# Patient Record
Sex: FEMALE | Race: WHITE | NOT HISPANIC OR LATINO | URBAN - METROPOLITAN AREA
[De-identification: names, ages, dates, MRNs, and addresses within clinical notes are randomized per-mention and may not be internally consistent; named-entity substitution may affect disease eponyms.]

---

## 2020-04-21 ENCOUNTER — EMERGENCY (EMERGENCY)
Facility: HOSPITAL | Age: 65
LOS: 1 days | Discharge: HOME | End: 2020-04-21
Attending: EMERGENCY MEDICINE
Payer: COMMERCIAL

## 2020-04-21 VITALS
TEMPERATURE: 98 F | HEART RATE: 68 BPM | HEIGHT: 67 IN | DIASTOLIC BLOOD PRESSURE: 81 MMHG | RESPIRATION RATE: 18 BRPM | SYSTOLIC BLOOD PRESSURE: 152 MMHG | OXYGEN SATURATION: 100 % | WEIGHT: 149.91 LBS

## 2020-04-21 DIAGNOSIS — Z90.12 ACQUIRED ABSENCE OF LEFT BREAST AND NIPPLE: Chronic | ICD-10-CM

## 2020-04-21 LAB
ALBUMIN SERPL ELPH-MCNC: 4.8 G/DL — SIGNIFICANT CHANGE UP (ref 3.3–5)
ALP SERPL-CCNC: 85 U/L — SIGNIFICANT CHANGE UP (ref 40–120)
ALT FLD-CCNC: 16 U/L — SIGNIFICANT CHANGE UP (ref 10–45)
ANION GAP SERPL CALC-SCNC: 15 MMOL/L — SIGNIFICANT CHANGE UP (ref 5–17)
APTT BLD: 26.3 SEC — LOW (ref 27.5–36.3)
AST SERPL-CCNC: 14 U/L — SIGNIFICANT CHANGE UP (ref 10–40)
BASOPHILS # BLD AUTO: 0.02 K/UL — SIGNIFICANT CHANGE UP (ref 0–0.2)
BASOPHILS NFR BLD AUTO: 0.2 % — SIGNIFICANT CHANGE UP (ref 0–2)
BILIRUB SERPL-MCNC: 0.6 MG/DL — SIGNIFICANT CHANGE UP (ref 0.2–1.2)
BUN SERPL-MCNC: 20 MG/DL — SIGNIFICANT CHANGE UP (ref 7–23)
CALCIUM SERPL-MCNC: 9.2 MG/DL — SIGNIFICANT CHANGE UP (ref 8.4–10.5)
CHLORIDE SERPL-SCNC: 106 MMOL/L — SIGNIFICANT CHANGE UP (ref 96–108)
CO2 SERPL-SCNC: 22 MMOL/L — SIGNIFICANT CHANGE UP (ref 22–31)
CREAT SERPL-MCNC: 0.71 MG/DL — SIGNIFICANT CHANGE UP (ref 0.5–1.3)
EOSINOPHIL # BLD AUTO: 0.07 K/UL — SIGNIFICANT CHANGE UP (ref 0–0.5)
EOSINOPHIL NFR BLD AUTO: 0.8 % — SIGNIFICANT CHANGE UP (ref 0–6)
GLUCOSE SERPL-MCNC: 119 MG/DL — HIGH (ref 70–99)
HCT VFR BLD CALC: 42.5 % — SIGNIFICANT CHANGE UP (ref 34.5–45)
HGB BLD-MCNC: 14 G/DL — SIGNIFICANT CHANGE UP (ref 11.5–15.5)
IMM GRANULOCYTES NFR BLD AUTO: 0.4 % — SIGNIFICANT CHANGE UP (ref 0–1.5)
INR BLD: 1.05 RATIO — SIGNIFICANT CHANGE UP (ref 0.88–1.16)
LYMPHOCYTES # BLD AUTO: 2.09 K/UL — SIGNIFICANT CHANGE UP (ref 1–3.3)
LYMPHOCYTES # BLD AUTO: 23.2 % — SIGNIFICANT CHANGE UP (ref 13–44)
MCHC RBC-ENTMCNC: 28.4 PG — SIGNIFICANT CHANGE UP (ref 27–34)
MCHC RBC-ENTMCNC: 32.9 GM/DL — SIGNIFICANT CHANGE UP (ref 32–36)
MCV RBC AUTO: 86.2 FL — SIGNIFICANT CHANGE UP (ref 80–100)
MONOCYTES # BLD AUTO: 0.66 K/UL — SIGNIFICANT CHANGE UP (ref 0–0.9)
MONOCYTES NFR BLD AUTO: 7.3 % — SIGNIFICANT CHANGE UP (ref 2–14)
NEUTROPHILS # BLD AUTO: 6.12 K/UL — SIGNIFICANT CHANGE UP (ref 1.8–7.4)
NEUTROPHILS NFR BLD AUTO: 68.1 % — SIGNIFICANT CHANGE UP (ref 43–77)
NRBC # BLD: 0 /100 WBCS — SIGNIFICANT CHANGE UP (ref 0–0)
PLATELET # BLD AUTO: 199 K/UL — SIGNIFICANT CHANGE UP (ref 150–400)
POTASSIUM SERPL-MCNC: 3.7 MMOL/L — SIGNIFICANT CHANGE UP (ref 3.5–5.3)
POTASSIUM SERPL-SCNC: 3.7 MMOL/L — SIGNIFICANT CHANGE UP (ref 3.5–5.3)
PROT SERPL-MCNC: 7.9 G/DL — SIGNIFICANT CHANGE UP (ref 6–8.3)
PROTHROM AB SERPL-ACNC: 12 SEC — SIGNIFICANT CHANGE UP (ref 10–12.9)
RBC # BLD: 4.93 M/UL — SIGNIFICANT CHANGE UP (ref 3.8–5.2)
RBC # FLD: 13.4 % — SIGNIFICANT CHANGE UP (ref 10.3–14.5)
SODIUM SERPL-SCNC: 143 MMOL/L — SIGNIFICANT CHANGE UP (ref 135–145)
WBC # BLD: 9 K/UL — SIGNIFICANT CHANGE UP (ref 3.8–10.5)
WBC # FLD AUTO: 9 K/UL — SIGNIFICANT CHANGE UP (ref 3.8–10.5)

## 2020-04-21 PROCEDURE — 70450 CT HEAD/BRAIN W/O DYE: CPT | Mod: 26,77

## 2020-04-21 PROCEDURE — 70450 CT HEAD/BRAIN W/O DYE: CPT | Mod: 26

## 2020-04-21 PROCEDURE — 71045 X-RAY EXAM CHEST 1 VIEW: CPT | Mod: 26

## 2020-04-21 PROCEDURE — 12001 RPR S/N/AX/GEN/TRNK 2.5CM/<: CPT

## 2020-04-21 PROCEDURE — 72125 CT NECK SPINE W/O DYE: CPT | Mod: 26

## 2020-04-21 PROCEDURE — 72170 X-RAY EXAM OF PELVIS: CPT | Mod: 26

## 2020-04-21 PROCEDURE — 93010 ELECTROCARDIOGRAM REPORT: CPT | Mod: 59

## 2020-04-21 PROCEDURE — 73070 X-RAY EXAM OF ELBOW: CPT | Mod: 26,LT

## 2020-04-21 PROCEDURE — 99291 CRITICAL CARE FIRST HOUR: CPT | Mod: 25

## 2020-04-21 RX ORDER — TETANUS TOXOID, REDUCED DIPHTHERIA TOXOID AND ACELLULAR PERTUSSIS VACCINE, ADSORBED 5; 2.5; 8; 8; 2.5 [IU]/.5ML; [IU]/.5ML; UG/.5ML; UG/.5ML; UG/.5ML
0.5 SUSPENSION INTRAMUSCULAR ONCE
Refills: 0 | Status: COMPLETED | OUTPATIENT
Start: 2020-04-21 | End: 2020-04-21

## 2020-04-21 RX ADMIN — TETANUS TOXOID, REDUCED DIPHTHERIA TOXOID AND ACELLULAR PERTUSSIS VACCINE, ADSORBED 0.5 MILLILITER(S): 5; 2.5; 8; 8; 2.5 SUSPENSION INTRAMUSCULAR at 23:10

## 2020-04-21 NOTE — ED PROCEDURE NOTE - CPROC ED POST PROC CARE GUIDE1
Verbal/written post procedure instructions were given to patient/caregiver.
Verbal/written post procedure instructions were given to patient/caregiver./Instructed patient/caregiver to follow-up with primary care physician./Instructed patient/caregiver regarding signs and symptoms of infection./Keep the cast/splint/dressing clean and dry.

## 2020-04-21 NOTE — CONSULT NOTE ADULT - ASSESSMENT
64F s/p fall, sustaining trace subarachnoid hemorrhage, minimally displaced avulsion fracture of left olecranon.     - Appreciate Neurosurgery recommendations for trace subarachnoid hemorrhage: f/u repeat head CT 4h after original  - Consider Orthopedics consult for L olecranon fracture    Patient seen/examined with Chief Resident Courtney. Discussed with attending.    Trauma/Acute Care Surgery [id number] 64F s/p fall, sustaining trace subarachnoid hemorrhage, minimally displaced avulsion fracture of left olecranon.     - Appreciate Neurosurgery recommendations for trace subarachnoid hemorrhage: f/u repeat head CT 4h after original  - Consider Orthopedics consult for L olecranon fracture  - No objection to discharge from Trauma perspective if cleared by NSG/Ortho    Patient seen/examined with Chief Resident Courtney. Discussed with attending.    Trauma/Acute Care Surgery [id number]

## 2020-04-21 NOTE — CONSULT NOTE ADULT - SUBJECTIVE AND OBJECTIVE BOX
p (1480)     HPI:  64F pmhx breast ca in remission with trip and fall found to have small frontal / parietal tSAH, odd affect but otherwise intact.    Imaging:    Exam:  AOx3, strange affect, FC, PERRL, EOMI, no facial   5/5 throughout, no drift  SILT  no clonus    --Anticoagulation:    =====================  PAST MEDICAL HISTORY   Breast cancer  Hyperthyroidism  Hypothyroidism    PAST SURGICAL HISTORY   H/O left mastectomy  S/P D&C (status post dilation and curettage)  Abnormality, eye  C Section  Salivary Gland Disease    Tapazole (Hives)      MEDICATIONS:  Antibiotics:    Neuro:    Other:      SOCIAL HISTORY:   Occupation:   Marital Status:     FAMILY HISTORY:      ROS: Negative except per HPI    LABS:  PT/INR - ( 21 Apr 2020 18:49 )   PT: 12.0 sec;   INR: 1.05 ratio         PTT - ( 21 Apr 2020 18:49 )  PTT:26.3 sec                        14.0   9.00  )-----------( 199      ( 21 Apr 2020 18:49 )             42.5     04-21    143  |  106  |  20  ----------------------------<  119<H>  3.7   |  22  |  0.71    Ca    9.2      21 Apr 2020 18:49    TPro  7.9  /  Alb  4.8  /  TBili  0.6  /  DBili  x   /  AST  14  /  ALT  16  /  AlkPhos  85  04-21
TRAUMA SERVICE (Acute Care Surgery / ACS - #9039) - CONSULT NOTE  --------------------------------------------------------------------------------------------    TRAUMA ACTIVATION LEVEL: 3    MECHANISM OF INJURY:    [] Blunt	[] MVC	[x] Fall	[] Pedestrian Struck	[] Motorcycle accident    [] Penetrating	[] Gun Shot Wound 	[] Stab Wound    GCS: 	E: 4	V: 5	M: 6    HPI: 64F PMH breast ca s/p mastectomy w/implant reconstruction, HTN, hypothryoidism, no AC/AP presents after fall down 6 steps. Patient reports she has had dizziness recently for which she is seeing outpatient Neurologist and ENT. She lost her balance and fell down 6 steps, hit her head and her elbow, ?LOC. Fall was witnessed by her son who is an EMT and who brought her to [facility] ED. Patient was stable upon presentation to ED and imaging revealed frontal/parietal SAH, and L olecranon fx. Scalp laceration was repaired in ED. Neurosurgery was consulted for SAH, and Trauma Surgery consulted for evaluation of polytrauma. Upon evaluation in ED patient c/o elbow pain, however she denies any other complaints. The patient denies fever, chills; chest pain, SOB, palpitation, weakness; nausea, vomiting; diarrhea, constipation; abdominal pain; bladder and bowel problems; leg swelling; sick contacts; and recent travel.     Primary Survey:   A - airway intact  B - bilateral breath sounds and good chest rise  C - initial BP: 128/72 (04-21-20 @ 19:08) , HR: 65 (04-21-20 @ 19:08) , palpable pulses in all extremities  D - GCS 15 on arrival  Exposure obtained      Secondary Survey:  General: NAD, confused affect  HEENT: dried blood to left scalp, L scalp laceration with staples intact, EOMI, PEERLA.  Neck: Soft, midline trachea.  Chest: No chest wall tenderness.   Cardiac: S1, S2, RRR  Respiratory: Bilateral breath sounds, clear and equal bilaterally  Abdomen: Soft, non-distended, non-tender, no rebound, no guarding, no masses palpated  Groin: Normal appearing  Ext: palp radial b/l UE, b/l DP palp in Lower Extrem, motor and sensory grossly intact in all 4 extremities; L elbow ROM limited 2/2 pain  Back: no TTP, no palpable runoff/stepoff/deformity    Patient denies fevers/chills, denies SOB/chest pain, denies nausea/vomiting, denies constipation/diarrhea.    ROS: 10-system review is otherwise negative except HPI above.      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 Lives with  who is an ophthalmologist. Works in 's office.    ALLERGIES: Tapazole (Hives)      HOME MEDICATIONS: norvasc    --------------------------------------------------------------------------------------------    Vitals:   T(C): 37.2 (04-21-20 @ 19:08), Max: 37.2 (04-21-20 @ 19:08)  HR: 65 (04-21-20 @ 19:08) (65 - 68)  BP: 128/72 (04-21-20 @ 19:08) (128/72 - 152/81)  RR: 18 (04-21-20 @ 19:08) (18 - 18)  SpO2: 97% (04-21-20 @ 19:08) (97% - 100%)      Height (cm): 170.18 (04-21 @ 18:13)  Weight (kg): 68 (04-21 @ 18:13)  BMI (kg/m2): 23.5 (04-21 @ 18:13)  BSA (m2): 1.79 (04-21 @ 18:13)    --------------------------------------------------------------------------------------------    LABS  CBC (04-21 @ 18:49)                              14.0                           9.00    )----------------(  199        68.1  % Neutrophils, 23.2  % Lymphocytes, ANC: 6.12                                42.5      BMP (04-21 @ 18:49)             143     |  106     |  20    		Ca++ --      Ca 9.2                ---------------------------------( 119<H>		Mg --                 3.7     |  22      |  0.71  			Ph --        LFTs (04-21 @ 18:49)      TPro 7.9 / Alb 4.8 / TBili 0.6 / DBili -- / AST 14 / ALT 16 / AlkPhos 85    Coags (04-21 @ 18:49)  aPTT 26.3<L> / INR 1.05 / PT 12.0          --------------------------------------------------------------------------------------------    MICROBIOLOGY      --------------------------------------------------------------------------------------------    IMAGING    < from: CT Head No Cont (04.21.20 @ 19:51) >  MPRESSION: Trace left posterior frontal traumatic subarachnoid hemorrhage. Left parietal extracalvarial soft tissue swelling.    < end of copied text >    < from: CT Cervical Spine No Cont (04.21.20 @ 19:34) >  IMPRESSION: Mild degenerative changes. No acute fractures or dislocations.    < end of copied text >    < from: Xray Elbow AP + Lateral, Left (04.21.20 @ 20:30) >  IMPRESSION:     Generalized osseous demineralization. There is an acute minimally displaced avulsion fracture of the olecranon with a large amount of overlying soft tissue swelling. The remaining left elbow is free of acute fracture or dislocation.       < end of copied text >    < from: Xray Pelvis AP only (04.21.20 @ 20:22) >    IMPRESSION:     No acute fracture or dislocation.  Degenerative changes of spine.  Spatulated right transverse process of the fifth lumbar spine representing transitional vertebra.    < end of copied text >    < from: Xray Chest 1 View AP/PA (04.21.20 @ 20:22) >  IMPRESSION:   Mild bilateral perihilar hazy opacities, which may represent infection.    < end of copied text >

## 2020-04-21 NOTE — ED ADULT NURSE NOTE - OBJECTIVE STATEMENT
63 y/o female coming in via EMS from home s/p mechanical fall. AOx4, ambulatory at baseline, PMH hypothyroid on synthroid, HTN, breast cancer with single mastectomy on left side. Pt. states she was at home and slipped down a few stairs, unwitnessed fall. Pt. reports that she remembers the event. pt. also reports baseline dizziness that she has had for a few months now and had an outpatient MRI of her brain approx. 1 month ago. From fall, pt. sustained left laceration above the eye and left elbow hematoma. Reporting 4/10 left elbow pain upon movement. pt. denies any chest pain, abdominal pain, SOB, N/V/D. VSS. 20G RAC. Pt. placed in hospital gown. Will continue to reassess.

## 2020-04-21 NOTE — ED ADULT NURSE REASSESSMENT NOTE - NS ED NURSE REASSESS COMMENT FT1
Pt resting comfortably. EKG completed by RN. No c/o discomfort or s/s distress. No active bleeding visible. VSS.

## 2020-04-21 NOTE — ED PROVIDER NOTE - OBJECTIVE STATEMENT
64 year old female PMH breast CA s/p mastectomy, hypothyroidism, HTN, BIBEMS s/p fall at home. Patient fell down approximately 6 stairs after "losing her balance", states she did not lose consciousness, remembers entire incident, ambulatory after fall. Not on ASA or AC. Struck L forehead and L elbow. Currently only c/o L elbow pain. States she has chronic dizziness, seen outpatient by neuro and had recent MRI/MRA w/o significant findings. Patient does not feel more dizzy currently than baseline. Denies fever, visual changes, cp, sob, abd pain, N/V, weakness, or paresthesias.

## 2020-04-21 NOTE — CONSULT NOTE ADULT - ASSESSMENT
Younger, Yelitza  64F pmhx breast ca in remission with trip and fall found to have small frontal / parietal tSAH, odd affect but otherwise intact.  - no acute intervention  - repeat cth 4h from original  - outpatient f/u with Dr. Aquino after discharge

## 2020-04-21 NOTE — ED PROVIDER NOTE - NSFOLLOWUPINSTRUCTIONS_ED_ALL_ED_FT
1- Tylenol as needed for pain  2- Keep arm in sling, you can remove this periodically throughout the day and move your shoulder around.  Follow up with Dr Mcgee next week  3- If you are having any headaches, confusion, blurry vision, nausea/vomting, numbness/weakness, or any other concerns come back to the ER immediately.  You can follow up with Dr Aquino with regard to the brain bleed next week.

## 2020-04-21 NOTE — ED PROCEDURE NOTE - CPROC ED TIME OUT STATEMENT1
“Patient's name, , procedure and correct site were confirmed during the [place] Timeout.”
“Patient's name, , procedure and correct site were confirmed during the [place] Timeout.”

## 2020-04-21 NOTE — ED PROVIDER NOTE - PATIENT PORTAL LINK FT
You can access the FollowMyHealth Patient Portal offered by [facility] by registering at the following website: [email or web address]. By joining [facility]’s FollowMyHealth portal, you will also be able to view your health information using other applications (apps) compatible with our system.

## 2020-04-21 NOTE — ED PROVIDER NOTE - CARE PROVIDER_API CALL
Vikas Aquino)  Neurological Surgery  [address], [address] [place] [id number]  Phone: [phone]  Fax: [phone]  Follow Up Time:     Josh Mcgee)  Orthopaedic Surgery  [address], [address]  [place] [address]  Phone: [phone]  Fax: [phone]  Follow Up Time:

## 2020-04-21 NOTE — ED PROVIDER NOTE - CLINICAL SUMMARY MEDICAL DECISION MAKING FREE TEXT BOX
Wing: 64 year old female s/p fall down 6 steps. not on ac. no loc, + head injury, + pain to elbow. will get imaging. laceration repair in scalp. reassess

## 2020-04-21 NOTE — ED PROVIDER NOTE - CARE PLAN
Principal Discharge DX:	SAH (subarachnoid hemorrhage)  Secondary Diagnosis:	Elbow fracture, left, closed, initial encounter

## 2020-04-21 NOTE — ED PROVIDER NOTE - PHYSICAL EXAMINATION
GENERAL: A&Ox3, non-toxic appearing, no acute distress  HEENT: NCAT, EOMI, oral mucosa moist, normal conjunctiva  RESP: CTAB, no respiratory distress, no wheezes/rhonchi/rales, speaking in full sentences  CV: RRR, no murmurs/rubs/gallops  ABDOMEN: soft, non-tender, non-distended, no guarding  MSK: no midline spinal tenderness, L elbow FROM  NEURO: no focal sensory or motor deficits, normal CN exam, COSBY, 5/5 strength in all extremities, SILT, PERRL  SKIN: warm, normal color, well perfused, no rash, hematoma to L elbow, laceration to L anterior forehead  PSYCH: normal affect    -Laith Begum, PGY-1 GENERAL: A&Ox3, non-toxic appearing, no acute distress  HEENT: NCAT, EOMI, oral mucosa moist, normal conjunctiva  RESP: CTAB, no respiratory distress, no wheezes/rhonchi/rales, speaking in full sentences  CV: RRR, no murmurs/rubs/gallops  ABDOMEN: soft, non-tender, non-distended, no guarding  MSK: no midline spinal tenderness, L elbow FROM  NEURO: no focal sensory or motor deficits, normal CN exam, COSBY, 5/5 strength in all extremities, SILT, PERRL  SKIN: warm, normal color, well perfused, no rash, hematoma to L elbow, linear 2.5cm laceration to L anterior scalp  PSYCH: normal affect    -Laith Begum, PGY-1

## 2020-04-21 NOTE — ED PROVIDER NOTE - CARE PROVIDERS DIRECT ADDRESSES
[email or web address]

## 2020-04-21 NOTE — ED PROVIDER NOTE - PSH
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

## 2020-04-21 NOTE — ED PROVIDER NOTE - PROGRESS NOTE DETAILS
Wing: Patient with SAH on ct head. spoke with nsx. Patient repeat CT head shows no interval change. spoke with Neurosurgery and cleared patient to be discharged. to f/u outpatient. patient updated.

## 2020-04-22 VITALS
TEMPERATURE: 99 F | HEART RATE: 82 BPM | OXYGEN SATURATION: 99 % | SYSTOLIC BLOOD PRESSURE: 152 MMHG | RESPIRATION RATE: 18 BRPM | DIASTOLIC BLOOD PRESSURE: 73 MMHG

## 2020-05-01 ENCOUNTER — APPOINTMENT (OUTPATIENT)
Dept: NEUROSURGERY | Facility: CLINIC | Age: 65
End: 2020-05-01
Payer: COMMERCIAL

## 2020-05-01 DIAGNOSIS — S06.5X9A TRAUMATIC SUBDURAL HEMORRHAGE WITH LOSS OF CONSCIOUSNESS OF UNSPECIFIED DURATION, INITIAL ENCOUNTER: ICD-10-CM

## 2020-05-01 PROCEDURE — 99204 OFFICE O/P NEW MOD 45 MIN: CPT | Mod: 95

## 2020-05-04 NOTE — ASSESSMENT
[FreeTextEntry1] : Tele health visit from 9:35 am to 10:17 am. (52 min)\par Impression:\par S/P fall with trace subarachnoid hemorrhage in the left posterior frontal region which was stable on second CT in the ER. Patient with good cognitive functions, has no  neurological symptoms other than mild  balance issues following up with ENT.  Will repeat imaging in a week with follow up appointment.\par \par \par Plan:\par CT head w/o contrast in a week \par PT focused on balance from [facility], 2-3 times/week x 8 weeks\par Continue home medications\par Precautions given for prevention of fall\par Will do the follow up on 5/8 at 9:30 am\par Advised to consult her physician for shortness of breath.\par

## 2020-05-04 NOTE — RESULTS/DATA
[FreeTextEntry1] : Trace subarachnoid hemorrhage in the left posterior frontal region \par is stable. There is no obvious new acute intracranial hemorrhage. There is \par no large cortical infarct, mass effect or midline shift. Nonspecific mild \par periventricular and subcortical white matter lucencies likely represent \par chronic microvascular ischemic changes. There is mild cerebral volume loss \par with ventricular dilatation. \par \par There is no depressed skull fracture. Again noted is a left parietal scalp \par soft tissue hematoma/swelling. There are skin staples overlying the left \par frontal scalp laceration. Stable small density in the left anterior ethmoid \par sinus is suggestive of an osteoma. There is minimal sinus mucosal \par thickening. The tympanomastoid region is clear. There is no significant \par interval change. \par \par IMPRESSION: \par \par No significant interval change since 4/21/2020 7:21 PM.

## 2020-05-04 NOTE — HISTORY OF PRESENT ILLNESS
[< 3 months] : less than 3 months [Home] : at home, [unfilled] , at the time of the visit. [Other Location: e.g. Home (Enter Location, City,State)___] : at [unfilled] [Other:____] : [unfilled] [Self] : self [Patient] : the patient [Patient Declined  Services] : - None: Patient declined  services [de-identified] : Ms Arthur is a  64 yrs old pleasant lady with PMH of breast cancer,hypothyroidism and HTN , not on any AC,  here consulting for her brain bleed after a fall on 4/21. On 4/21 she dripped and fell while coming down to steps, 4-5 flights, with hitting her head on the floor. She was taken to [facility] ER  and CT done showed  that she had a bleed in the head. She was observed few more hours in the ER and CT repeated showed stable SAH.  She had a elbow fracture and a sling was placed on left arm.Her scalp was stapled in ER and was discharged home.\par \par Today Ms Arthur  presented for telehealth believes that she is not feeling well. Denies headache, speech impairment, vision problems or seizures. She reported having balance problem while walking, and consulted ENT for it and they said has some issues with ear but not a reason for dizziness. She wears sling on left arm, waking at home with support, she holds the wall while walking as she feels imbalance often. She takes Tamoxifen 20 mg daily, not taking any pain medication. She has some memory issues , but she said she had that even before the accident. Her staplers on the scalp was removed by her , who is a physician. She was able to write down and  repeat all the instructions which was given during the conversation.

## 2020-05-04 NOTE — PHYSICAL EXAM
[General Appearance - Alert] : alert [General Appearance - Well Nourished] : well nourished [Oriented To Time, Place, And Person] : oriented to person, place, and time [General Appearance - Well-Appearing] : healthy appearing [Person] : oriented to person [Affect] : the affect was normal [Place] : oriented to place [Time] : oriented to time [Coordination - Dysmetria Impaired Finger-to-Nose Right Only] : not present on the ride side [FreeTextEntry5] : EOM mostly intact [FreeTextEntry8] : no pronator drift, could not test drift or finger to nose on left arm.  Pt appeared to be walking fairly well while holding the phone, but she said she was holding onto the wall. [FreeTextEntry1] : Mild SOB at rest (pt said she is not normally winded)

## 2020-05-07 ENCOUNTER — APPOINTMENT (OUTPATIENT)
Dept: CT IMAGING | Facility: CLINIC | Age: 65
End: 2020-05-07

## 2020-05-08 ENCOUNTER — APPOINTMENT (OUTPATIENT)
Dept: NEUROSURGERY | Facility: CLINIC | Age: 65
End: 2020-05-08
Payer: COMMERCIAL

## 2020-05-08 PROCEDURE — 99443: CPT

## 2020-05-08 NOTE — PHYSICAL EXAM
[General Appearance - Alert] : alert [General Appearance - Well Nourished] : well nourished [General Appearance - Well-Appearing] : healthy appearing [Oriented To Time, Place, And Person] : oriented to person, place, and time [Memory Recent] : recent memory was not impaired [Affect] : the affect was normal [Person] : oriented to person [Place] : oriented to place [Time] : oriented to time

## 2020-05-13 NOTE — ASSESSMENT
[FreeTextEntry1] : \par Tele health visit from 9:45 am to 10:15 am. (30min)\par Impression:\par S/P fall with trace subarachnoid hemorrhage in the left posterior frontal region which was stable on second CT in the ER. Patient with good cognitive functions, has no neurological symptoms other than mild balance issues following up with ENT. Will repeat imaging in a week with follow up appointment.\par \par \par Plan:\par Precautions given for prevention of fall\par PT focused on balance  2-3 times/week x 8 weeks\par [id number]\par Continue home medications\par 2 weeks follow up \par

## 2020-05-13 NOTE — RESULTS/DATA
[FreeTextEntry1] : CT head at [facility] site ([facility] radiology [place]) seen on portal, report obtained from portal: 5/7/2020.  No new blood or collections, resolution of previous hemorrhage.

## 2020-05-13 NOTE — HISTORY OF PRESENT ILLNESS
[Home] : at home, [unfilled] , at the time of the visit. [Other Location: e.g. Home (Enter Location, City,State)___] : at [unfilled] [Other:____] : [unfilled] [Patient] : the patient [Self] : self [FreeTextEntry2] : Telephone visit.  Pt not able to tolerate the process of negotiating the log-on to telehealth visit today [FreeTextEntry1] : Ms Arthur is a 64 yrs old pleasant lady with PMH of breast cancer,hypothyroidism and HTN , not on any AC, here consulting for her brain bleed after a fall on 4/21. On 4/21 she dripped and fell while coming down to steps, 4-5 flights, with hitting her head on the floor. She was taken to [facility] ER and CT done showed that she had a bleed in the head. She was observed few more hours in the ER and CT repeated showed stable SAH. She had a elbow fracture and a sling was placed on left arm.Her scalp was stapled in ER and was discharged home.\par \par Today Ms Arthur presented for telephone visit and believes that she is not feeling well. She had a fall yesterday after coming back from CT scan, she dripped and fall while stepping on the front of her house and fell hitting her shoulder on the grass. Denies headache, speech impairment, vision problems or seizures. She reported still  having balance problem while walking. she holds the wall while walking as she feels imbalance often.Today also she was able to write down and repeat all the instructions which was given during the conversation

## 2020-10-15 ENCOUNTER — OUTPATIENT (OUTPATIENT)
Dept: OUTPATIENT SERVICES | Facility: HOSPITAL | Age: 65
LOS: 1 days | End: 2020-10-15
Payer: MEDICARE

## 2020-10-15 ENCOUNTER — RESULT REVIEW (OUTPATIENT)
Age: 65
End: 2020-10-15

## 2020-10-15 ENCOUNTER — APPOINTMENT (OUTPATIENT)
Dept: RADIOLOGY | Facility: HOSPITAL | Age: 65
End: 2020-10-15
Payer: MEDICARE

## 2020-10-15 DIAGNOSIS — Z90.12 ACQUIRED ABSENCE OF LEFT BREAST AND NIPPLE: Chronic | ICD-10-CM

## 2020-10-15 DIAGNOSIS — R27.0 ATAXIA, UNSPECIFIED: ICD-10-CM

## 2020-10-15 LAB
APPEARANCE CSF: CLEAR — SIGNIFICANT CHANGE UP
APPEARANCE SPUN FLD: COLORLESS — SIGNIFICANT CHANGE UP
COLOR CSF: SIGNIFICANT CHANGE UP
GLUCOSE CSF-MCNC: 70 MG/DL — SIGNIFICANT CHANGE UP (ref 40–70)
GRAM STN FLD: SIGNIFICANT CHANGE UP
LYMPHOCYTES # CSF: 81 % — HIGH (ref 40–80)
MONOS+MACROS NFR CSF: 19 % — SIGNIFICANT CHANGE UP (ref 15–45)
NEUTROPHILS # CSF: 0 % — SIGNIFICANT CHANGE UP (ref 0–6)
NRBC NFR CSF: 2 /UL — SIGNIFICANT CHANGE UP (ref 0–5)
PROT CSF-MCNC: 49 MG/DL — HIGH (ref 15–45)
RBC # CSF: 0 /UL — SIGNIFICANT CHANGE UP (ref 0–0)
SPECIMEN SOURCE: SIGNIFICANT CHANGE UP
TUBE TYPE: SIGNIFICANT CHANGE UP

## 2020-10-15 PROCEDURE — 88108 CYTOPATH CONCENTRATE TECH: CPT | Mod: 26

## 2020-10-15 PROCEDURE — 62328 DX LMBR SPI PNXR W/FLUOR/CT: CPT

## 2020-10-16 LAB
LABORATORY COMMENT REPORT: SIGNIFICANT CHANGE UP
SOURCE HSV 1/2: SIGNIFICANT CHANGE UP

## 2020-10-18 LAB
CULTURE RESULTS: NO GROWTH — SIGNIFICANT CHANGE UP
EBV PCR: SIGNIFICANT CHANGE UP IU/ML
SPECIMEN SOURCE: SIGNIFICANT CHANGE UP

## 2021-04-19 ENCOUNTER — INPATIENT (INPATIENT)
Facility: HOSPITAL | Age: 66
LOS: 7 days | Discharge: NURSING, REHAB OR LONG-TERM CARE | DRG: 964 | End: 2021-04-27
Attending: SURGERY | Admitting: SURGERY
Payer: MEDICARE

## 2021-04-19 VITALS — HEIGHT: 67 IN

## 2021-04-19 DIAGNOSIS — Z90.12 ACQUIRED ABSENCE OF LEFT BREAST AND NIPPLE: Chronic | ICD-10-CM

## 2021-04-19 DIAGNOSIS — S09.90XA UNSPECIFIED INJURY OF HEAD, INITIAL ENCOUNTER: ICD-10-CM

## 2021-04-19 LAB
ALBUMIN SERPL ELPH-MCNC: 4.1 G/DL — SIGNIFICANT CHANGE UP (ref 3.3–5)
ALP SERPL-CCNC: 92 U/L — SIGNIFICANT CHANGE UP (ref 40–120)
ALT FLD-CCNC: 37 U/L — SIGNIFICANT CHANGE UP (ref 10–45)
ANION GAP SERPL CALC-SCNC: 14 MMOL/L — SIGNIFICANT CHANGE UP (ref 5–17)
APPEARANCE UR: CLEAR — SIGNIFICANT CHANGE UP
APTT BLD: 25.2 SEC — LOW (ref 27.5–35.5)
APTT BLD: 28 SEC — SIGNIFICANT CHANGE UP (ref 27.5–35.5)
AST SERPL-CCNC: 39 U/L — SIGNIFICANT CHANGE UP (ref 10–40)
BACTERIA # UR AUTO: NEGATIVE — SIGNIFICANT CHANGE UP
BASE EXCESS BLDV CALC-SCNC: -4.4 MMOL/L — LOW (ref -2–2)
BASE EXCESS BLDV CALC-SCNC: -5.1 MMOL/L — LOW (ref -2–2)
BASOPHILS # BLD AUTO: 0.06 K/UL — SIGNIFICANT CHANGE UP (ref 0–0.2)
BASOPHILS NFR BLD AUTO: 0.3 % — SIGNIFICANT CHANGE UP (ref 0–2)
BILIRUB SERPL-MCNC: 0.5 MG/DL — SIGNIFICANT CHANGE UP (ref 0.2–1.2)
BILIRUB UR-MCNC: NEGATIVE — SIGNIFICANT CHANGE UP
BUN SERPL-MCNC: 19 MG/DL — SIGNIFICANT CHANGE UP (ref 7–23)
CA-I SERPL-SCNC: 1.1 MMOL/L — LOW (ref 1.12–1.3)
CA-I SERPL-SCNC: 1.15 MMOL/L — SIGNIFICANT CHANGE UP (ref 1.12–1.3)
CALCIUM SERPL-MCNC: 9 MG/DL — SIGNIFICANT CHANGE UP (ref 8.4–10.5)
CHLORIDE BLDV-SCNC: 114 MMOL/L — HIGH (ref 96–108)
CHLORIDE BLDV-SCNC: 114 MMOL/L — HIGH (ref 96–108)
CHLORIDE SERPL-SCNC: 107 MMOL/L — SIGNIFICANT CHANGE UP (ref 96–108)
CK SERPL-CCNC: 134 U/L — SIGNIFICANT CHANGE UP (ref 25–170)
CO2 BLDV-SCNC: 20 MMOL/L — LOW (ref 22–30)
CO2 BLDV-SCNC: 20 MMOL/L — LOW (ref 22–30)
CO2 SERPL-SCNC: 21 MMOL/L — LOW (ref 22–31)
COLOR SPEC: COLORLESS — SIGNIFICANT CHANGE UP
CREAT SERPL-MCNC: 0.62 MG/DL — SIGNIFICANT CHANGE UP (ref 0.5–1.3)
DIFF PNL FLD: NEGATIVE — SIGNIFICANT CHANGE UP
EOSINOPHIL # BLD AUTO: 0.07 K/UL — SIGNIFICANT CHANGE UP (ref 0–0.5)
EOSINOPHIL NFR BLD AUTO: 0.4 % — SIGNIFICANT CHANGE UP (ref 0–6)
EPI CELLS # UR: 1 /HPF — SIGNIFICANT CHANGE UP
GAS PNL BLDA: SIGNIFICANT CHANGE UP
GAS PNL BLDA: SIGNIFICANT CHANGE UP
GAS PNL BLDV: 134 MMOL/L — LOW (ref 135–145)
GAS PNL BLDV: 137 MMOL/L — SIGNIFICANT CHANGE UP (ref 135–145)
GAS PNL BLDV: SIGNIFICANT CHANGE UP
GLUCOSE BLDV-MCNC: 143 MG/DL — HIGH (ref 70–99)
GLUCOSE BLDV-MCNC: 155 MG/DL — HIGH (ref 70–99)
GLUCOSE SERPL-MCNC: 163 MG/DL — HIGH (ref 70–99)
GLUCOSE UR QL: NEGATIVE — SIGNIFICANT CHANGE UP
HCO3 BLDV-SCNC: 19 MMOL/L — LOW (ref 21–29)
HCO3 BLDV-SCNC: 19 MMOL/L — LOW (ref 21–29)
HCT VFR BLD CALC: 28.4 % — LOW (ref 34.5–45)
HCT VFR BLD CALC: 29.8 % — LOW (ref 34.5–45)
HCT VFR BLD CALC: 33.8 % — LOW (ref 34.5–45)
HCT VFR BLD CALC: 34.6 % — SIGNIFICANT CHANGE UP (ref 34.5–45)
HCT VFR BLD CALC: 35.9 % — SIGNIFICANT CHANGE UP (ref 34.5–45)
HCT VFR BLD CALC: 39.6 % — SIGNIFICANT CHANGE UP (ref 34.5–45)
HCT VFR BLD CALC: 41.2 % — SIGNIFICANT CHANGE UP (ref 34.5–45)
HCT VFR BLDA CALC: 32 % — LOW (ref 39–50)
HCT VFR BLDA CALC: 42 % — SIGNIFICANT CHANGE UP (ref 39–50)
HGB BLD CALC-MCNC: 10.2 G/DL — LOW (ref 11.5–15.5)
HGB BLD CALC-MCNC: 13.6 G/DL — SIGNIFICANT CHANGE UP (ref 11.5–15.5)
HGB BLD-MCNC: 10.9 G/DL — LOW (ref 11.5–15.5)
HGB BLD-MCNC: 11.2 G/DL — LOW (ref 11.5–15.5)
HGB BLD-MCNC: 11.5 G/DL — SIGNIFICANT CHANGE UP (ref 11.5–15.5)
HGB BLD-MCNC: 13 G/DL — SIGNIFICANT CHANGE UP (ref 11.5–15.5)
HGB BLD-MCNC: 13.3 G/DL — SIGNIFICANT CHANGE UP (ref 11.5–15.5)
HGB BLD-MCNC: 9.2 G/DL — LOW (ref 11.5–15.5)
HGB BLD-MCNC: 9.7 G/DL — LOW (ref 11.5–15.5)
HOROWITZ INDEX BLDV+IHG-RTO: 21 — SIGNIFICANT CHANGE UP
HOROWITZ INDEX BLDV+IHG-RTO: 28 — SIGNIFICANT CHANGE UP
HYALINE CASTS # UR AUTO: 2 /LPF — SIGNIFICANT CHANGE UP (ref 0–2)
IMM GRANULOCYTES NFR BLD AUTO: 2.3 % — HIGH (ref 0–1.5)
INR BLD: 1.02 RATIO — SIGNIFICANT CHANGE UP (ref 0.88–1.16)
INR BLD: 1.13 RATIO — SIGNIFICANT CHANGE UP (ref 0.88–1.16)
KETONES UR-MCNC: ABNORMAL
LACTATE BLDV-MCNC: 2.7 MMOL/L — HIGH (ref 0.7–2)
LACTATE BLDV-MCNC: 3.7 MMOL/L — HIGH (ref 0.7–2)
LEUKOCYTE ESTERASE UR-ACNC: NEGATIVE — SIGNIFICANT CHANGE UP
LIDOCAIN IGE QN: 49 U/L — SIGNIFICANT CHANGE UP (ref 7–60)
LYMPHOCYTES # BLD AUTO: 21.8 % — SIGNIFICANT CHANGE UP (ref 13–44)
LYMPHOCYTES # BLD AUTO: 4.1 K/UL — HIGH (ref 1–3.3)
MCHC RBC-ENTMCNC: 28.8 PG — SIGNIFICANT CHANGE UP (ref 27–34)
MCHC RBC-ENTMCNC: 28.8 PG — SIGNIFICANT CHANGE UP (ref 27–34)
MCHC RBC-ENTMCNC: 28.9 PG — SIGNIFICANT CHANGE UP (ref 27–34)
MCHC RBC-ENTMCNC: 28.9 PG — SIGNIFICANT CHANGE UP (ref 27–34)
MCHC RBC-ENTMCNC: 29.1 PG — SIGNIFICANT CHANGE UP (ref 27–34)
MCHC RBC-ENTMCNC: 29.3 PG — SIGNIFICANT CHANGE UP (ref 27–34)
MCHC RBC-ENTMCNC: 29.6 PG — SIGNIFICANT CHANGE UP (ref 27–34)
MCHC RBC-ENTMCNC: 32 GM/DL — SIGNIFICANT CHANGE UP (ref 32–36)
MCHC RBC-ENTMCNC: 32.2 GM/DL — SIGNIFICANT CHANGE UP (ref 32–36)
MCHC RBC-ENTMCNC: 32.3 GM/DL — SIGNIFICANT CHANGE UP (ref 32–36)
MCHC RBC-ENTMCNC: 32.4 GM/DL — SIGNIFICANT CHANGE UP (ref 32–36)
MCHC RBC-ENTMCNC: 32.4 GM/DL — SIGNIFICANT CHANGE UP (ref 32–36)
MCHC RBC-ENTMCNC: 32.6 GM/DL — SIGNIFICANT CHANGE UP (ref 32–36)
MCHC RBC-ENTMCNC: 32.8 GM/DL — SIGNIFICANT CHANGE UP (ref 32–36)
MCV RBC AUTO: 88.7 FL — SIGNIFICANT CHANGE UP (ref 80–100)
MCV RBC AUTO: 89 FL — SIGNIFICANT CHANGE UP (ref 80–100)
MCV RBC AUTO: 89.2 FL — SIGNIFICANT CHANGE UP (ref 80–100)
MCV RBC AUTO: 89.2 FL — SIGNIFICANT CHANGE UP (ref 80–100)
MCV RBC AUTO: 89.9 FL — SIGNIFICANT CHANGE UP (ref 80–100)
MCV RBC AUTO: 90.2 FL — SIGNIFICANT CHANGE UP (ref 80–100)
MCV RBC AUTO: 91.8 FL — SIGNIFICANT CHANGE UP (ref 80–100)
MONOCYTES # BLD AUTO: 0.87 K/UL — SIGNIFICANT CHANGE UP (ref 0–0.9)
MONOCYTES NFR BLD AUTO: 4.6 % — SIGNIFICANT CHANGE UP (ref 2–14)
NEUTROPHILS # BLD AUTO: 13.23 K/UL — HIGH (ref 1.8–7.4)
NEUTROPHILS NFR BLD AUTO: 70.6 % — SIGNIFICANT CHANGE UP (ref 43–77)
NITRITE UR-MCNC: NEGATIVE — SIGNIFICANT CHANGE UP
NRBC # BLD: 0 /100 WBCS — SIGNIFICANT CHANGE UP (ref 0–0)
OTHER CELLS CSF MANUAL: 12 ML/DL — LOW (ref 18–22)
OTHER CELLS CSF MANUAL: 13 ML/DL — LOW (ref 18–22)
PCO2 BLDV: 32 MMHG — LOW (ref 39–42)
PCO2 BLDV: 34 MMHG — LOW (ref 39–42)
PH BLDV: 7.38 — SIGNIFICANT CHANGE UP (ref 7.35–7.45)
PH BLDV: 7.38 — SIGNIFICANT CHANGE UP (ref 7.35–7.45)
PH UR: 6.5 — SIGNIFICANT CHANGE UP (ref 5–8)
PLATELET # BLD AUTO: 120 K/UL — LOW (ref 150–400)
PLATELET # BLD AUTO: 123 K/UL — LOW (ref 150–400)
PLATELET # BLD AUTO: 125 K/UL — LOW (ref 150–400)
PLATELET # BLD AUTO: 125 K/UL — LOW (ref 150–400)
PLATELET # BLD AUTO: 148 K/UL — LOW (ref 150–400)
PLATELET # BLD AUTO: 154 K/UL — SIGNIFICANT CHANGE UP (ref 150–400)
PLATELET # BLD AUTO: 236 K/UL — SIGNIFICANT CHANGE UP (ref 150–400)
PO2 BLDV: 37 MMHG — SIGNIFICANT CHANGE UP (ref 25–45)
PO2 BLDV: 51 MMHG — HIGH (ref 25–45)
POTASSIUM BLDV-SCNC: 3.6 MMOL/L — SIGNIFICANT CHANGE UP (ref 3.5–5.3)
POTASSIUM BLDV-SCNC: 4.4 MMOL/L — SIGNIFICANT CHANGE UP (ref 3.5–5.3)
POTASSIUM SERPL-MCNC: 4 MMOL/L — SIGNIFICANT CHANGE UP (ref 3.5–5.3)
POTASSIUM SERPL-SCNC: 4 MMOL/L — SIGNIFICANT CHANGE UP (ref 3.5–5.3)
PROT SERPL-MCNC: 6.7 G/DL — SIGNIFICANT CHANGE UP (ref 6–8.3)
PROT UR-MCNC: ABNORMAL
PROTHROM AB SERPL-ACNC: 12.2 SEC — SIGNIFICANT CHANGE UP (ref 10.6–13.6)
PROTHROM AB SERPL-ACNC: 13.5 SEC — SIGNIFICANT CHANGE UP (ref 10.6–13.6)
RBC # BLD: 3.19 M/UL — LOW (ref 3.8–5.2)
RBC # BLD: 3.36 M/UL — LOW (ref 3.8–5.2)
RBC # BLD: 3.68 M/UL — LOW (ref 3.8–5.2)
RBC # BLD: 3.85 M/UL — SIGNIFICANT CHANGE UP (ref 3.8–5.2)
RBC # BLD: 3.98 M/UL — SIGNIFICANT CHANGE UP (ref 3.8–5.2)
RBC # BLD: 4.44 M/UL — SIGNIFICANT CHANGE UP (ref 3.8–5.2)
RBC # BLD: 4.62 M/UL — SIGNIFICANT CHANGE UP (ref 3.8–5.2)
RBC # FLD: 13 % — SIGNIFICANT CHANGE UP (ref 10.3–14.5)
RBC # FLD: 13 % — SIGNIFICANT CHANGE UP (ref 10.3–14.5)
RBC # FLD: 13.1 % — SIGNIFICANT CHANGE UP (ref 10.3–14.5)
RBC # FLD: 13.2 % — SIGNIFICANT CHANGE UP (ref 10.3–14.5)
RBC # FLD: 13.3 % — SIGNIFICANT CHANGE UP (ref 10.3–14.5)
RBC CASTS # UR COMP ASSIST: 4 /HPF — SIGNIFICANT CHANGE UP (ref 0–4)
SAO2 % BLDV: 69 % — SIGNIFICANT CHANGE UP (ref 67–88)
SAO2 % BLDV: 85 % — SIGNIFICANT CHANGE UP (ref 67–88)
SARS-COV-2 RNA SPEC QL NAA+PROBE: SIGNIFICANT CHANGE UP
SODIUM SERPL-SCNC: 142 MMOL/L — SIGNIFICANT CHANGE UP (ref 135–145)
SP GR SPEC: 1.04 — HIGH (ref 1.01–1.02)
TROPONIN T, HIGH SENSITIVITY RESULT: <6 NG/L — SIGNIFICANT CHANGE UP (ref 0–51)
UROBILINOGEN FLD QL: NEGATIVE — SIGNIFICANT CHANGE UP
WBC # BLD: 11.09 K/UL — HIGH (ref 3.8–10.5)
WBC # BLD: 11.68 K/UL — HIGH (ref 3.8–10.5)
WBC # BLD: 12.91 K/UL — HIGH (ref 3.8–10.5)
WBC # BLD: 15.22 K/UL — HIGH (ref 3.8–10.5)
WBC # BLD: 15.72 K/UL — HIGH (ref 3.8–10.5)
WBC # BLD: 17.87 K/UL — HIGH (ref 3.8–10.5)
WBC # BLD: 18.77 K/UL — HIGH (ref 3.8–10.5)
WBC # FLD AUTO: 11.09 K/UL — HIGH (ref 3.8–10.5)
WBC # FLD AUTO: 11.68 K/UL — HIGH (ref 3.8–10.5)
WBC # FLD AUTO: 12.91 K/UL — HIGH (ref 3.8–10.5)
WBC # FLD AUTO: 15.22 K/UL — HIGH (ref 3.8–10.5)
WBC # FLD AUTO: 15.72 K/UL — HIGH (ref 3.8–10.5)
WBC # FLD AUTO: 17.87 K/UL — HIGH (ref 3.8–10.5)
WBC # FLD AUTO: 18.77 K/UL — HIGH (ref 3.8–10.5)
WBC UR QL: 1 /HPF — SIGNIFICANT CHANGE UP (ref 0–5)

## 2021-04-19 PROCEDURE — 99221 1ST HOSP IP/OBS SF/LOW 40: CPT

## 2021-04-19 PROCEDURE — 73120 X-RAY EXAM OF HAND: CPT | Mod: 26,59,LT

## 2021-04-19 PROCEDURE — 73030 X-RAY EXAM OF SHOULDER: CPT | Mod: 26,LT

## 2021-04-19 PROCEDURE — 72190 X-RAY EXAM OF PELVIS: CPT | Mod: 26

## 2021-04-19 PROCEDURE — 73130 X-RAY EXAM OF HAND: CPT | Mod: 26,LT

## 2021-04-19 PROCEDURE — 70486 CT MAXILLOFACIAL W/O DYE: CPT | Mod: 26

## 2021-04-19 PROCEDURE — 72125 CT NECK SPINE W/O DYE: CPT | Mod: 26

## 2021-04-19 PROCEDURE — 71260 CT THORAX DX C+: CPT | Mod: 26,MA

## 2021-04-19 PROCEDURE — 76705 ECHO EXAM OF ABDOMEN: CPT | Mod: 26

## 2021-04-19 PROCEDURE — 99285 EMERGENCY DEPT VISIT HI MDM: CPT

## 2021-04-19 PROCEDURE — 99291 CRITICAL CARE FIRST HOUR: CPT

## 2021-04-19 PROCEDURE — 76377 3D RENDER W/INTRP POSTPROCES: CPT | Mod: 26

## 2021-04-19 PROCEDURE — 70450 CT HEAD/BRAIN W/O DYE: CPT | Mod: 26,76

## 2021-04-19 PROCEDURE — 73000 X-RAY EXAM OF COLLAR BONE: CPT | Mod: 26,LT

## 2021-04-19 PROCEDURE — 93010 ELECTROCARDIOGRAM REPORT: CPT

## 2021-04-19 PROCEDURE — 71045 X-RAY EXAM CHEST 1 VIEW: CPT | Mod: 26

## 2021-04-19 PROCEDURE — 74177 CT ABD & PELVIS W/CONTRAST: CPT | Mod: 26

## 2021-04-19 RX ORDER — CHLORHEXIDINE GLUCONATE 213 G/1000ML
1 SOLUTION TOPICAL DAILY
Refills: 0 | Status: DISCONTINUED | OUTPATIENT
Start: 2021-04-19 | End: 2021-04-19

## 2021-04-19 RX ORDER — SODIUM CHLORIDE 9 MG/ML
1000 INJECTION INTRAMUSCULAR; INTRAVENOUS; SUBCUTANEOUS ONCE
Refills: 0 | Status: COMPLETED | OUTPATIENT
Start: 2021-04-19 | End: 2021-04-19

## 2021-04-19 RX ORDER — CHLORHEXIDINE GLUCONATE 213 G/1000ML
1 SOLUTION TOPICAL
Refills: 0 | Status: DISCONTINUED | OUTPATIENT
Start: 2021-04-20 | End: 2021-04-24

## 2021-04-19 RX ORDER — ACETAMINOPHEN 500 MG
1000 TABLET ORAL ONCE
Refills: 0 | Status: COMPLETED | OUTPATIENT
Start: 2021-04-19 | End: 2021-04-19

## 2021-04-19 RX ORDER — CALCIUM GLUCONATE 100 MG/ML
2 VIAL (ML) INTRAVENOUS ONCE
Refills: 0 | Status: COMPLETED | OUTPATIENT
Start: 2021-04-19 | End: 2021-04-19

## 2021-04-19 RX ORDER — HYDROMORPHONE HYDROCHLORIDE 2 MG/ML
0.25 INJECTION INTRAMUSCULAR; INTRAVENOUS; SUBCUTANEOUS
Refills: 0 | Status: DISCONTINUED | OUTPATIENT
Start: 2021-04-19 | End: 2021-04-20

## 2021-04-19 RX ORDER — HYDROMORPHONE HYDROCHLORIDE 2 MG/ML
0.25 INJECTION INTRAMUSCULAR; INTRAVENOUS; SUBCUTANEOUS
Refills: 0 | Status: DISCONTINUED | OUTPATIENT
Start: 2021-04-19 | End: 2021-04-19

## 2021-04-19 RX ORDER — ACETAMINOPHEN 500 MG
1000 TABLET ORAL ONCE
Refills: 0 | Status: DISCONTINUED | OUTPATIENT
Start: 2021-04-19 | End: 2021-04-19

## 2021-04-19 RX ORDER — SODIUM CHLORIDE 9 MG/ML
1000 INJECTION INTRAMUSCULAR; INTRAVENOUS; SUBCUTANEOUS
Refills: 0 | Status: DISCONTINUED | OUTPATIENT
Start: 2021-04-19 | End: 2021-04-20

## 2021-04-19 RX ORDER — LIDOCAINE 4 G/100G
1 CREAM TOPICAL DAILY
Refills: 0 | Status: DISCONTINUED | OUTPATIENT
Start: 2021-04-19 | End: 2021-04-27

## 2021-04-19 RX ORDER — LEVOTHYROXINE SODIUM 125 MCG
84 TABLET ORAL AT BEDTIME
Refills: 0 | Status: DISCONTINUED | OUTPATIENT
Start: 2021-04-19 | End: 2021-04-22

## 2021-04-19 RX ORDER — TETANUS TOXOID, REDUCED DIPHTHERIA TOXOID AND ACELLULAR PERTUSSIS VACCINE, ADSORBED 5; 2.5; 8; 8; 2.5 [IU]/.5ML; [IU]/.5ML; UG/.5ML; UG/.5ML; UG/.5ML
0.5 SUSPENSION INTRAMUSCULAR ONCE
Refills: 0 | Status: COMPLETED | OUTPATIENT
Start: 2021-04-19 | End: 2021-04-19

## 2021-04-19 RX ORDER — LEVETIRACETAM 250 MG/1
500 TABLET, FILM COATED ORAL EVERY 12 HOURS
Refills: 0 | Status: DISCONTINUED | OUTPATIENT
Start: 2021-04-19 | End: 2021-04-23

## 2021-04-19 RX ORDER — SODIUM CHLORIDE 9 MG/ML
1000 INJECTION INTRAMUSCULAR; INTRAVENOUS; SUBCUTANEOUS ONCE
Refills: 0 | Status: DISCONTINUED | OUTPATIENT
Start: 2021-04-19 | End: 2021-04-19

## 2021-04-19 RX ORDER — ACETAMINOPHEN 500 MG
1000 TABLET ORAL EVERY 6 HOURS
Refills: 0 | Status: COMPLETED | OUTPATIENT
Start: 2021-04-20 | End: 2021-04-20

## 2021-04-19 RX ADMIN — SODIUM CHLORIDE 100 MILLILITER(S): 9 INJECTION INTRAMUSCULAR; INTRAVENOUS; SUBCUTANEOUS at 13:14

## 2021-04-19 RX ADMIN — LIDOCAINE 1 PATCH: 4 CREAM TOPICAL at 13:15

## 2021-04-19 RX ADMIN — Medication 400 MILLIGRAM(S): at 17:35

## 2021-04-19 RX ADMIN — LEVETIRACETAM 400 MILLIGRAM(S): 250 TABLET, FILM COATED ORAL at 17:35

## 2021-04-19 RX ADMIN — Medication 400 MILLIGRAM(S): at 23:04

## 2021-04-19 RX ADMIN — HYDROMORPHONE HYDROCHLORIDE 0.25 MILLIGRAM(S): 2 INJECTION INTRAMUSCULAR; INTRAVENOUS; SUBCUTANEOUS at 13:15

## 2021-04-19 RX ADMIN — Medication 84 MICROGRAM(S): at 21:57

## 2021-04-19 RX ADMIN — Medication 1000 MILLIGRAM(S): at 17:42

## 2021-04-19 RX ADMIN — SODIUM CHLORIDE 4000 MILLILITER(S): 9 INJECTION INTRAMUSCULAR; INTRAVENOUS; SUBCUTANEOUS at 13:14

## 2021-04-19 RX ADMIN — Medication 200 GRAM(S): at 13:15

## 2021-04-19 RX ADMIN — HYDROMORPHONE HYDROCHLORIDE 0.25 MILLIGRAM(S): 2 INJECTION INTRAMUSCULAR; INTRAVENOUS; SUBCUTANEOUS at 13:30

## 2021-04-19 RX ADMIN — Medication 1000 MILLIGRAM(S): at 23:05

## 2021-04-19 RX ADMIN — CHLORHEXIDINE GLUCONATE 1 APPLICATION(S): 213 SOLUTION TOPICAL at 16:08

## 2021-04-19 RX ADMIN — LIDOCAINE 1 PATCH: 4 CREAM TOPICAL at 18:58

## 2021-04-19 RX ADMIN — Medication 400 MILLIGRAM(S): at 11:10

## 2021-04-19 RX ADMIN — SODIUM CHLORIDE 4000 MILLILITER(S): 9 INJECTION INTRAMUSCULAR; INTRAVENOUS; SUBCUTANEOUS at 16:08

## 2021-04-19 RX ADMIN — Medication 1000 MILLIGRAM(S): at 13:52

## 2021-04-19 RX ADMIN — TETANUS TOXOID, REDUCED DIPHTHERIA TOXOID AND ACELLULAR PERTUSSIS VACCINE, ADSORBED 0.5 MILLILITER(S): 5; 2.5; 8; 8; 2.5 SUSPENSION INTRAMUSCULAR at 16:09

## 2021-04-19 NOTE — CONSULT NOTE ADULT - ASSESSMENT
YOUNGER, JONATHAN    66YO F PMHx Alzheimer's dementia (AOx2 baseline), BIBEMS as Lvl2 s/p fall down stairs w/ facial trauma/bruising, no AC/AP. CTH shows trace b/l frontal tsah, no apparent calvarial fx although sphenoid/ethmoid/max sinuses with some fluid, will fu final read. Also has pelvic fx. Exam: AOx2, Pupils 3R, L eye bruising, FC x4, BUE ag strong, BLE move in plane of bed, pain craig.  - Admitted to SICU, continue q1h neuro checks pending repeat CTH  - Repeat CTH in 4-6h today  - INR and Plt wnl  - Hold all systemic AC/AP until outpt fu, and hold DVT ppx until 24h AFTER a second 12-24h stability CTH.  - no indication for AEDs at this time  - Pain control/care per SICU  - fu outpatient with Dr. Eastman in 2 weeks post-discharge  - no neurosurgical contraindication to OR with ortho if repeat 4-6h CTH stable.

## 2021-04-19 NOTE — CONSULT NOTE ADULT - ATTENDING COMMENTS
Agree with diagnosis and treatment plan  L distal clavicle fx nondisplaced, nonop, pendulums, use of walker as tolerated, no lifting beyond 1-3 lbs for 6 weeks  L iliac wing fracture, no surgery reqired, protected WBAT with walker for 6-8 weeks until pain improves  DVT ppx for 6 weeks recommended due to fractures and likely immobilization as per medical team decision making  Ortho recommendation is for lovenox 40daily for 6 weeks as medically indicated

## 2021-04-19 NOTE — ED PROVIDER NOTE - OBJECTIVE STATEMENT
64 yo f pmh dementia, bibems w/ facial trauma s/p fall down flight of stairs. not reported on ac/ap. ems provides hx, pt not speaking in ed.

## 2021-04-19 NOTE — BH CONSULTATION LIAISON ASSESSMENT NOTE - HPI (INCLUDE ILLNESS QUALITY, SEVERITY, DURATION, TIMING, CONTEXT, MODIFYING FACTORS, ASSOCIATED SIGNS AND SYMPTOMS)
Pt is a 64 y/o female, , lives with family, no pphx, hx breast cancer s/p mastectomy, HTN, hypothryoidism, not on full anticoagulation/antiplatelets per EMS, presenting as a level 2 trauma activation after a fall from a flight of stairs found to have (1) SAH, (2) left-sided rib fractures, (3) left iliac wing fracture with hematoma, (4) left forehead laceration, psych consulted for med mgt, delirium, depression.   Pt mostly nonverbal, answering few questions. Pt lethargic,not following commands. Pt denies pain. Pt denies si/hi. Pt denies denies depression,anxiety, psychosis. Pt disoriented, confused, unable to explain details of admission.   Collateral obtained from son, states pt has been more confused since she fell, not as alert. at baseline pt knows place, time, no memory issues at baseline. Pt was not depressed, or anxious. no si/hi expressed. no agitation. he had no concerns for safety. pt has never seen a psychiatrist, never been on psych meds.  no drug hx, no alcohol use. pt has a hx of falls.

## 2021-04-19 NOTE — ED PROVIDER NOTE - PHYSICAL EXAMINATION
General: unwell appearing   HEENT: EOMI, PERRLA, hematoma left upper eyelid, left scalp laceration  Neck: supple, no lymphadenopathy, c collar in place  CV: RRR, normal S1 and S2 with no murmur, capillary refill less than two seconds  Resp: lungs CTA b/l  Abd: non-distended, soft, non-tender  : no CVA tenderness  MSK: full range of motion, no cyanosis, no edema  Neuro: CN II-XII grossly intact, muscle strength 5/5 in all extremities, responsive to physical stimuli, withdraws from pain, gcs 13

## 2021-04-19 NOTE — H&P ADULT - ASSESSMENT
65F hx breast cancer s/p mastectomy, HTN, hypothryoidism, not on full anticoagulation/antiplatelets per EMS, presenting as a level 2 trauma activation after a fall from a flight of stairs found to have (1) SAH, (2) left-sided rib fractures, (3) left iliac wing fracture with hematoma, (4) left forehead laceration    - Admit to Trauma Surgery under Dr. Jorge Luis Farah  - SICU consultation for hemodynamic monitoring, hemorrhage watch protocol, neuro checks  - Orthopedics consultation for left iliac wing fracture  - Will require C-collar clearance (currently with distracting injury)  - Forehead laceration repair at bedside  - Repeat head CT, seizure ppx with Keppra  - Tertiary exam  - Will obtain home medications from son  - Hold chemical VTE ppx for now, SCDs  - PT evaluation after full evaluation    Seen w/ Dr. Gagan Field, PGY-3  Trauma Surgery (ACS)  [id number] with questions  65F hx breast cancer s/p mastectomy, HTN, hypothryoidism, not on full anticoagulation/antiplatelets per EMS, presenting as a level 2 trauma activation after a fall from a flight of stairs found to have (1) SAH, (2) left-sided rib fractures, (3) left iliac wing fracture with hematoma, (4) left forehead laceration    - Admit to Trauma Surgery under Dr. Jorge Luis Farah  - SICU consultation for hemodynamic monitoring, hemorrhage watch protocol, neuro checks  - Orthopedics consultation for left iliac wing fracture  - Will require C-collar clearance (currently with distracting injury)  - Forehead laceration repair at bedside  - Left hand XR for 5th digit edema/tenderness  - Repeat head CT in 4-6h, seizure ppx with Keppra, appreciate NSLESLI recs  - Tertiary exam  - Will obtain home medications from son  - Hold chemical VTE ppx for now, SCDs  - PT evaluation after full evaluation    Seen w/ Dr. Gagan Field, PGY-3  Trauma Surgery (ACS)  [id number] with questions

## 2021-04-19 NOTE — CONSULT NOTE ADULT - SUBJECTIVE AND OBJECTIVE BOX
CC: Patient is a 65y old  Female who presents with a chief complaint of Fall from flight of stairs (2021 13:15)    HPI:  From H&P  65F hx breast cancer s/p mastectomy, HTN, hypothryoidism, not on full anticoagulation/antiplatelets per EMS, presenting as a level 2 trauma activation after a fall from a flight of stairs. Patient only able to say "ow" in trauma bay, unable to provide further history. However per son, normal mentation at baseline.     In the trauma bay, airway was intact with bilateral breath sounds, O2 saturation 100% on room air. Initially blood pressure was 140s systolic however on repeat 90s. NS@100 initiated. Secondary significant for left forehead laceration, left chest wall tenderness, left pelvic tenderness.  (2021 11:16)      Plastic Surgery consulted for findings on CT Maxillofacial scan: non displaced left nasal bone fracture, non displaced left frontal bone fracture extending into superior aspect of left orbital rim.  Patient is s/p suture repair of left forehead/hairline laceration    PMH  Hypothyroidism    Hyperthyroidism    Breast cancer      PSH  Salivary Gland Disease    C Section    Abnormality, eye    S/P D&amp;C (status post dilation and curettage)    H/O left mastectomy      MEDS    Allergies    Tapazole (Hives)    Intolerances        Social        Physical Exam  T(C): 36.5 (21 @ 12:00), Max: 36.5 (21 @ 12:00)  HR: 73 (21 @ 14:00) (65 - 81)  BP: 110/59 (21 @ 14:00) (91/52 - 133/75)  RR: 20 (21 @ 14:00) (16 - 33)  SpO2: 95% (21 @ 14:00) (93% - 100%)  Wt(kg): --  Tmax: T(C): , Max: 36.5 (21 @ 12:00)  Wt(kg): --    Gen: Laying in bed with C-collar in place, responding to questions   HEENT  On Inspection there is a laceration of the left forehead/hairline s/p suture repair with nylon suture.  The left eyelid had significant ecchymosis but does not appear tense    Palpation of the frontal bone, orbital rims, nasal bones and zygomas  reveals no step-offs, or crepitus.  She is TTP over left forehead, left superior orbital rim and left nasal bone    Vision is grossly intact.  Patient cooperates with examination when asked to move eyes left and right but is uncooperative with looking in upward or downward gaze. Denies pain with EOM. Pupils 2mm, patient had received Dilaudid prior to examination.    Dried blood in nares, no obvious septal hematoma. Examination of mandible limited by C collar    --------------------------------------------------------  IN:    IV PiggyBack: 300 mL    sodium chloride 0.9%: 300 mL    Sodium Chloride 0.9% Bolus: 3000 mL  Total IN: 3600 mL    OUT:    Indwelling Catheter - Urethral (mL): 575 mL  Total OUT: 575 mL    Total NET: 3025 mL          Labs:                        11.5   15.72 )-----------( 148      ( 2021 14:24 )             35.9         142  |  107  |  19  ----------------------------<  163<H>  4.0   |  21<L>  |  0.62    Ca    9.0      2021 10:45    TPro  6.7  /  Alb  4.1  /  TBili  0.5  /  DBili  x   /  AST  39  /  ALT  37  /  AlkPhos  92      PT/INR - ( 2021 10:45 )   PT: 12.2 sec;   INR: 1.02 ratio         PTT - ( 2021 10:45 )  PTT:25.2 sec  Urinalysis Basic - ( 2021 11:42 )    Color: Colorless / Appearance: Clear / S.039 / pH: x  Gluc: x / Ketone: Small  / Bili: Negative / Urobili: Negative   Blood: x / Protein: Trace / Nitrite: Negative   Leuk Esterase: Negative / RBC: 4 /hpf / WBC 1 /HPF   Sq Epi: x / Non Sq Epi: 1 /hpf / Bacteria: Negative      ABG - ( 2021 13:47 )  pH, Arterial: 7.31  pH, Blood: x     /  pCO2: 43    /  pO2: 27    / HCO3: 21    / Base Excess: -4.6  /  SaO2: 46                    Imaging  CT Maxillofacial No Cont:       CT MAXILLOFACIAL:  Left periorbital soft tissue hematoma. Nondisplaced fracture of the left nasal bone. Nondisplaced fracture of the lateral aspect of the superior rim of the left orbit. No radiopaque foreign body. Intraorbital contents are intact. Post septal fat and retrobulbar space are unremarkable. Bilateral cataract surgery. The globes are symmetric in size and contour. Optic nerves appear unremarkable. The lacrimal glands and extraocular muscles are not enlarged or deviated.    Small amount of hemorrhage within the left maxillary sinus. Remainder of the paranasal sinuses demonstrate mucosal thickening.      IMPRESSION:  CT head: Left frontal scalp hematoma and laceration. Nondisplaced fracture of the anterior-inferior aspect of the left frontal bone extending into the lateral aspect of the superior rim of the left orbit. No radiopaque foreign body. Small scattered areas of acute subarachnoid hemorrhage in the bilateral cerebral hemispheres.    CT cervical spine: No acute fracture or traumatic subluxation of the cervical spine. Small left hemopneumothorax. Displaced fractures of the left posterior third and fifth ribs.    CT maxillofacial bones: Left periorbital soft tissue hematoma. Nondisplaced fracture of the left nasal bone. Nondisplaced fracture of the lateral aspect of the superior rim of the left orbit. No radiopaque foreign body. Intraorbital contents are intact.    Findings were discussed by Dr. FERMIN Rosas with Dr. J CARLOS ONEAL [phone] on 2021 11:55 AM                WALTER ROSAS MD; Attending Radiologist  This document has been electronically signed. 2021 11:59AM (21 @ 11:24)

## 2021-04-19 NOTE — H&P ADULT - NSHPLABSRESULTS_GEN_ALL_CORE
13.0   18.77 )-----------( 236      ( 19 Apr 2021 10:45 )             39.6     04-19    142  |  107  |  19  ----------------------------<  163<H>  4.0   |  21<L>  |  0.62    Ca    9.0      19 Apr 2021 10:45    TPro  6.7  /  Alb  4.1  /  TBili  0.5  /  DBili  x   /  AST  39  /  ALT  37  /  AlkPhos  92  04-19        IMAGING:  Final reads pending

## 2021-04-19 NOTE — CONSULT NOTE ADULT - SUBJECTIVE AND OBJECTIVE BOX
HISTORY OF PRESENT ILLNESS:  JONATHAN CHILD is a 65y Female with h/o HTN, breast CA s/p L mastectomy , hypothyroidism admitted as level II trauma s/p fall down stairs.  As per son, patient is able to care for herself at baseline.  Patient only able to say "ow" in the trauma bay.  Initial BP 140s, but decreased to 90s on repeat.  Started on IVF.  Secondary survey positive for L scalp laceration, L chest wall tenderness, and L pelvic tenderness.  Patient more     PAST MEDICAL HISTORY:   HTN  Hypothyroidism  Breast cancer        PAST SURGICAL HISTORY:   C Section  D&C  H/O left mastectomy      FAMILY HISTORY:     SOCIAL HISTORY:    CODE STATUS: Full code     HOME MEDICATIONS: Amlodipine, Calcium-Vitamin D, Synthroid, Tamoxifen     ALLERGIES: Tapazole (Hives)      VITAL SIGNS:  ICU Vital Signs Last 24 Hrs  T(C): 36.5 (19 Apr 2021 12:00), Max: 36.5 (19 Apr 2021 12:00)  T(F): 97.7 (19 Apr 2021 12:00), Max: 97.7 (19 Apr 2021 12:00)  HR: 66 (19 Apr 2021 12:25) (65 - 77)  BP: 91/52 (19 Apr 2021 12:25) (91/52 - 133/75)  BP(mean): 66 (19 Apr 2021 12:25) (66 - 97)  RR: 16 (19 Apr 2021 12:25) (16 - 33)  SpO2: 97% (19 Apr 2021 12:25) (93% - 100%)      NEURO  Exam: GCS Opens eyes to voice, follows commands.   acetaminophen  IVPB .. 1000 milliGRAM(s) IV Intermittent once  acetaminophen  IVPB .. 1000 milliGRAM(s) IV Intermittent once  HYDROmorphone  Injectable 0.25 milliGRAM(s) IV Push every 3 hours PRN pain  levETIRAcetam  IVPB 500 milliGRAM(s) IV Intermittent every 12 hours      RESPIRATORY  Mechanical Ventilation: N/A  Exam: CTA B/L with decreased breath sounds at left base.       CARDIOVASCULAR  VBG - ( 19 Apr 2021 11:46 )  pH: 7.38  /  pCO2: 34    /  pO2: 37    / HCO3: 19    / Base Excess: -4.4  /  SaO2: 69     Lactate: 3.7    Exam: RRR. +S1, +S2.   Cardiac Rhythm: Sinus       GI/NUTRITION  Exam: Soft, non-tender, non-distended.   Diet: NPO      GENITOURINARY/RENAL  calcium gluconate IVPB 2 Gram(s) IV Intermittent once  sodium chloride 0.9% Bolus 1000 milliLiter(s) IV Bolus once  sodium chloride 0.9%. 1000 milliLiter(s) IV Continuous <Continuous>      04-19 @ 07:01  -  04-19 @ 13:15  --------------------------------------------------------  IN:    IV PiggyBack: 200 mL    sodium chloride 0.9%: 100 mL    Sodium Chloride 0.9% Bolus: 2000 mL  Total IN: 2300 mL    OUT:    Indwelling Catheter - Urethral (mL): 575 mL  Total OUT: 575 mL    Total NET: 1725 mL        Weight (kg): 59.9 (04-19 @ 11:10)  04-19    142  |  107  |  19  ----------------------------<  163<H>  4.0   |  21<L>  |  0.62    Ca    9.0      19 Apr 2021 10:45    TPro  6.7  /  Alb  4.1  /  TBili  0.5  /  DBili  x   /  AST  39  /  ALT  37  /  AlkPhos  92  04-19    [x ] Spangler catheter, indication: urine output monitoring in critically ill patient    HEMATOLOGIC  [x ] VTE Prophylaxis: SCDs                          13.3   17.87 )-----------( 154      ( 19 Apr 2021 12:05 )             41.2     PT/INR - ( 19 Apr 2021 10:45 )   PT: 12.2 sec;   INR: 1.02 ratio         PTT - ( 19 Apr 2021 10:45 )  PTT:25.2 sec  Transfusion: [ ] PRBC	[ ] Platelets	[ ] FFP	[ ] Cryoprecipitate      INFECTIOUS DISEASES  diphtheria/tetanus/pertussis (acellular) Vaccine (ADAcel) 0.5 milliLiter(s) IntraMuscular once    RECENT CULTURES:      ENDOCRINE  levothyroxine Injectable 84 MICROGram(s) IV Push at bedtime    CAPILLARY BLOOD GLUCOSE      POCT Blood Glucose.: 171 mg/dL (19 Apr 2021 10:31)      PATIENT CARE ACCESS DEVICES:  [x ] Peripheral IV  [ ] Central Venous Line	[ ] R	[ ] L	[ ] IJ	[ ] Fem	[ ] SC	Placed:   [ ] Arterial Line		[ ] R	[ ] L	[ ] Fem	[ ] Rad	[ ] Ax	Placed:   [ ] PICC:					[ ] Mediport  [ ] Urinary Catheter, Date Placed:   [x] Necessity of urinary, arterial, and venous catheters discussed    OTHER MEDICATIONS: chlorhexidine 2% Cloths 1 Application(s) Topical daily  lidocaine   Patch 1 Patch Transdermal daily      IMAGING STUDIES:  < from: CT Cervical Spine No Cont (04.19.21 @ 11:31) >  IMPRESSION:  CT head: Left frontal scalp hematoma and laceration. Nondisplaced fracture of the anterior-inferior aspect of the left frontal bone extending into the lateral aspect of the superior rim of the left orbit. No radiopaque foreign body. Small scattered areas of acute subarachnoid hemorrhage in the bilateral cerebral hemispheres.    CT cervical spine: No acute fracture or traumatic subluxation of the cervical spine. Small left hemopneumothorax. Displaced fractures of the left posterior third and fifth ribs.    CT maxillofacial bones: Left periorbital soft tissue hematoma. Nondisplaced fracture of the left nasal bone. Nondisplaced fracture of the lateral aspect of the superior rim of the left orbit. No radiopaque foreign body. Intraorbital contents are intact.      < end of copied text >  < from: CT Abdomen and Pelvis w/ IV Cont (04.19.21 @ 11:24) >  IMPRESSION:  1.  Multilevel bilateral rib fractures as described above.  2.  Tracecomplex fluid density collection in the left pleural space which may represent hemothorax in setting of trauma. Cannot exclude adjacent small lung contusion.  3.  Tiny pneumothorax seen at the left lung apex medially.  4.  Acute displaced and  comminuted fracture of the left iliac wing.  5.  Minimally displaced fracture of the sternum.  6.  Minimally displaced left clavicular fracture.  7.  Convex hypoattenuating lesion in the right renal midpole. Additional complex cyst in the left midpole with possible small intracystic enhancing mural component. Recommend renal ultrasound using color Doppler imaging to further evaluate.    < end of copied text >   HISTORY OF PRESENT ILLNESS:  JONATHAN CHILD is a 65y Female with h/o HTN, breast CA s/p L mastectomy, hypothyroidism admitted as level II trauma s/p fall down stairs.  As per son, patient is able to care for herself at baseline.  Patient only able to say "ow" in the trauma bay.  Initial BP 140s, but decreased to 90s on repeat.  Started on IVF.  Secondary survey positive for L scalp laceration, L chest wall tenderness, and L pelvic tenderness.  Patient more conversive upon arrival to SICU, GCS 13 (E3, V4, M6).  Patient became hypotensive to SBP 90s and received additional 1L bolus.  Imaging revealed the following injuries: L nasal bone fracture; L lateral orbit wall fracture; small scattered B/L SAHs; minimally displaced L clavicular fracture; minimally displaced sternal fracture; L 3-8 rib fractures; small L pneumothorax; R 7th rib fracture; likely chronic 6th and 8th rib fracture; acute displaced, comminuted fracture of the L iliac wing, and a R renal lesion.  Patient also found to have large hematoma near the iliac wing fracture.    PAST MEDICAL HISTORY:   HTN  Hypothyroidism  Breast cancer      PAST SURGICAL HISTORY:   C Section  D&C  H/O left mastectomy      FAMILY HISTORY:     SOCIAL HISTORY:    CODE STATUS: Full code     HOME MEDICATIONS: Amlodipine, Calcium-Vitamin D, Synthroid, Tamoxifen     ALLERGIES: Tapazole (Hives)      VITAL SIGNS:  ICU Vital Signs Last 24 Hrs  T(C): 36.5 (19 Apr 2021 12:00), Max: 36.5 (19 Apr 2021 12:00)  T(F): 97.7 (19 Apr 2021 12:00), Max: 97.7 (19 Apr 2021 12:00)  HR: 66 (19 Apr 2021 12:25) (65 - 77)  BP: 91/52 (19 Apr 2021 12:25) (91/52 - 133/75)  BP(mean): 66 (19 Apr 2021 12:25) (66 - 97)  RR: 16 (19 Apr 2021 12:25) (16 - 33)  SpO2: 97% (19 Apr 2021 12:25) (93% - 100%)      NEURO  Exam: GCS 13 (E3, V4, M6) Opens eyes to voice, follows commands.  A&O x 1 (self).  Patient does not remember fall, unable to answer more complex questions. +L eye ecchymosis.    acetaminophen  IVPB .. 1000 milliGRAM(s) IV Intermittent once  acetaminophen  IVPB .. 1000 milliGRAM(s) IV Intermittent once  HYDROmorphone  Injectable 0.25 milliGRAM(s) IV Push every 3 hours PRN pain  levETIRAcetam  IVPB 500 milliGRAM(s) IV Intermittent every 12 hours      RESPIRATORY  Mechanical Ventilation: N/A  Exam: CTA B/L. +L mastectomy.       CARDIOVASCULAR  VBG - ( 19 Apr 2021 11:46 )  pH: 7.38  /  pCO2: 34    /  pO2: 37    / HCO3: 19    / Base Excess: -4.4  /  SaO2: 69     Lactate: 3.7    Exam: RRR. +S1, +S2.   Cardiac Rhythm: Sinus       GI/NUTRITION  Exam: Soft, non-tender, non-distended.   Diet: NPO    Ext: COSBY. +5/5 strength in upper and lower extremities B/L.  +obvious L 5th finger deformity.  +2 pulses B/L.       GENITOURINARY/RENAL  calcium gluconate IVPB 2 Gram(s) IV Intermittent once  sodium chloride 0.9% Bolus 1000 milliLiter(s) IV Bolus once  sodium chloride 0.9%. 1000 milliLiter(s) IV Continuous <Continuous>      04-19 @ 07:01  -  04-19 @ 13:15  --------------------------------------------------------  IN:    IV PiggyBack: 200 mL    sodium chloride 0.9%: 100 mL    Sodium Chloride 0.9% Bolus: 2000 mL  Total IN: 2300 mL    OUT:    Indwelling Catheter - Urethral (mL): 575 mL  Total OUT: 575 mL    Total NET: 1725 mL        Weight (kg): 59.9 (04-19 @ 11:10)  04-19    142  |  107  |  19  ----------------------------<  163<H>  4.0   |  21<L>  |  0.62    Ca    9.0      19 Apr 2021 10:45    TPro  6.7  /  Alb  4.1  /  TBili  0.5  /  DBili  x   /  AST  39  /  ALT  37  /  AlkPhos  92  04-19    [x ] Spangler catheter, indication: urine output monitoring in critically ill patient    HEMATOLOGIC  [x ] VTE Prophylaxis: SCDs                          13.3   17.87 )-----------( 154      ( 19 Apr 2021 12:05 )             41.2     PT/INR - ( 19 Apr 2021 10:45 )   PT: 12.2 sec;   INR: 1.02 ratio         PTT - ( 19 Apr 2021 10:45 )  PTT:25.2 sec  Transfusion: [ ] PRBC	[ ] Platelets	[ ] FFP	[ ] Cryoprecipitate      INFECTIOUS DISEASES  diphtheria/tetanus/pertussis (acellular) Vaccine (ADAcel) 0.5 milliLiter(s) IntraMuscular once    RECENT CULTURES:      ENDOCRINE  levothyroxine Injectable 84 MICROGram(s) IV Push at bedtime    CAPILLARY BLOOD GLUCOSE      POCT Blood Glucose.: 171 mg/dL (19 Apr 2021 10:31)      PATIENT CARE ACCESS DEVICES:  [x ] Peripheral IV  [ ] Central Venous Line	[ ] R	[ ] L	[ ] IJ	[ ] Fem	[ ] SC	Placed:   [ ] Arterial Line		[ ] R	[ ] L	[ ] Fem	[ ] Rad	[ ] Ax	Placed:   [ ] PICC:					[ ] Mediport  [ ] Urinary Catheter, Date Placed:   [x] Necessity of urinary, arterial, and venous catheters discussed    OTHER MEDICATIONS: chlorhexidine 2% Cloths 1 Application(s) Topical daily  lidocaine   Patch 1 Patch Transdermal daily      IMAGING STUDIES:  < from: CT Cervical Spine No Cont (04.19.21 @ 11:31) >  IMPRESSION:  CT head: Left frontal scalp hematoma and laceration. Nondisplaced fracture of the anterior-inferior aspect of the left frontal bone extending into the lateral aspect of the superior rim of the left orbit. No radiopaque foreign body. Small scattered areas of acute subarachnoid hemorrhage in the bilateral cerebral hemispheres.    CT cervical spine: No acute fracture or traumatic subluxation of the cervical spine. Small left hemopneumothorax. Displaced fractures of the left posterior third and fifth ribs.    CT maxillofacial bones: Left periorbital soft tissue hematoma. Nondisplaced fracture of the left nasal bone. Nondisplaced fracture of the lateral aspect of the superior rim of the left orbit. No radiopaque foreign body. Intraorbital contents are intact.      < end of copied text >  < from: CT Abdomen and Pelvis w/ IV Cont (04.19.21 @ 11:24) >  IMPRESSION:  1.  Multilevel bilateral rib fractures as described above.  2.  Tracecomplex fluid density collection in the left pleural space which may represent hemothorax in setting of trauma. Cannot exclude adjacent small lung contusion.  3.  Tiny pneumothorax seen at the left lung apex medially.  4.  Acute displaced and  comminuted fracture of the left iliac wing.  5.  Minimally displaced fracture of the sternum.  6.  Minimally displaced left clavicular fracture.  7.  Convex hypoattenuating lesion in the right renal midpole. Additional complex cyst in the left midpole with possible small intracystic enhancing mural component. Recommend renal ultrasound using color Doppler imaging to further evaluate.    < end of copied text >

## 2021-04-19 NOTE — CONSULT NOTE ADULT - ASSESSMENT
Assessment/Plan:  65y Female with LEFT Iliac wing fracture    -Pain control as needed  -WBAT of the affected lower extremity   -PT/OT  -No orthopedic surgical intervention needed at this time  -DVT ppx per primary team  -Medical management per primary team  -Will discuss with attending, and advise if plan changes    ******INCOMPLETE NOTE IN PROGRESS******  ******INCOMPLETE NOTE IN PROGRESS******  ******INCOMPLETE NOTE IN PROGRESS******   Assessment/Plan:  65y Female with LEFT Iliac wing fracture    -FU CT pel official  -Needs XR pelvis - AP and Judet views  -Needs XR L shoulder  -Needs xray left hand for ?fx ?dislocation of left fifth digit  -Pain control as needed  -WBAT of the affected left lower extremity   -Medical management per SICU  -Will discuss with attending, and advise if plan changes   Assessment/Plan:  65y Female with LEFT Iliac wing fracture, left minimally displaced clavicle fracture    -Pain control as needed  -WBAT of the affected left lower extremity with walker   -WBAT L shoulder, pendulum exercises okay, no lifting, sling for comfort  -Encourage ROM of elbow/wrist/hand  -PT/OT  -Medical management per SICU  -Will discuss with attending, and advise if plan changes   Assessment/Plan:  65y Female with LEFT Iliac wing fracture, left minimally displaced clavicle fracture    -Pain control as needed  -WBAT of the affected left lower extremity with walker   -WBAT L shoulder, pendulum exercises okay, no lifting, sling for comfort  -Encourage ROM of elbow/wrist/hand  -PT/OT  -No orthopedic surgical intervention planned at this time  -Medical management per SICU  -Discussed with attending, who agrees with plan

## 2021-04-19 NOTE — ED PROVIDER NOTE - SECONDARY DIAGNOSIS.
Laceration of forehead Traumatic pneumothorax, initial encounter Pelvic fracture SAH (subarachnoid hemorrhage)

## 2021-04-19 NOTE — CONSULT NOTE ADULT - SUBJECTIVE AND OBJECTIVE BOX
65y Female presents as Level 2 trauma with LEFT iliac wing fx s/p mechanical fall down stairs per EMS. Pt transported to SICU shortly after arrival to ED due to hypotension. Found to have L iliac wing fx. In SICU, pt does not remember how she hurt herself. ROS and PE limited 2/2 mental status. + left eye hematoma with laceration over the left temporal region repaired by SICU.     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    C Section    Abnormality, eye  h/o left eye vitrectomy    S/P D&amp;C (status post dilation and curettage)    H/O left mastectomy      Home Medications:  amLODIPine 2.5 mg oral tablet: 1 tab(s) orally once a day (2021 12:03)  Calcium 500+D oral tablet, chewable: 1 tab(s) orally 2 times a day (2021 12:03)  Synthroid 112 mcg (0.112 mg) oral tablet: 1 tab(s) orally once a day (2021 12:03)  tamoxifen 20 mg oral tablet: 1 tab(s) orally once a day (2021 12:03)    Allergies    Tapazole (Hives)    Intolerances                              13.0   18.77 )-----------( 236      ( 2021 10:45 )             39.6     04-19    142  |  107  |  19  ----------------------------<  163<H>  4.0   |  21<L>  |  0.62    Ca    9.0      2021 10:45    TPro  6.7  /  Alb  4.1  /  TBili  0.5  /  DBili  x   /  AST  39  /  ALT  37  /  AlkPhos  92  04-19    PT/INR - ( 2021 10:45 )   PT: 12.2 sec;   INR: 1.02 ratio         PTT - ( 2021 10:45 )  PTT:25.2 sec  Urinalysis Basic - ( 2021 11:42 )    Color: Colorless / Appearance: Clear / S.039 / pH: x  Gluc: x / Ketone: Small  / Bili: Negative / Urobili: Negative   Blood: x / Protein: Trace / Nitrite: Negative   Leuk Esterase: Negative / RBC: 4 /hpf / WBC 1 /HPF   Sq Epi: x / Non Sq Epi: 1 /hpf / Bacteria: Negative          Vital Signs Last 24 Hrs  T(C): 36 (2021 11:10), Max: 36 (2021 11:10)  T(F): 96.8 (2021 11:10), Max: 96.8 (2021 11:10)  HR: 70 (2021 11:10) (70 - 70)  BP: 133/75 (2021 11:10) (133/75 - 133/75)  BP(mean): 97 (2021 11:10) (97 - 97)  RR: 33 (2021 11:10) (33 - 33)  SpO2: 100% (2021 11:10) (100% - 100%)    PHYSICAL EXAM  General: NAD, Awake and Alert    LEFT Lower Extremity:   Skin intact  No ecchymosis or swelling  No gross deformity   TTP over the left ilium  NTTP over the bony prominences of the hip/knee/ankle/foot/toes  L2-S1 SILT  Motor grossly intact throughout Quads/Hams/EHL/FHL/TA/GSC  Left iliac pain with passive/active ROM of hip  No pain with log roll or axial loading  +DP pulses  Calf nontender  Compartments soft and compressible    SECONDARY EXAM: ~4cm laceration over the left temporal region, left eye hematoma, sensation appears to be intact throughout, motor grossly intact throughout, no other orthopedic injuries at this time, compartments soft and compressible    SPINE: Skin intact, no bony tenderness or step-offs appreciated throughout cervical/thoracic/lumbar/sacral spine    UE: Skin intact, no erythema, ecchymosis, edema, gross deformity, NTTP over the bony prominences of the shoulder/elbow/wrist/hand, painless passive/active ROM of the shoulder/elbow/wrist/hand, C5-T1 SILT, motor grossly intact throughout axillary/musculocutaneous/radial/median/ulnar nerves, + radial pulse    LE: Skin intact, no erythema, ecchymosis, edema, gross deformity, NTTP over the bony prominences of the hip/knee/ankle/foot, painless passive/active ROM of the hip/knee/ankle/foot, L2-S1 SILT, motor grossly intact throughout Hip Flexors/Quadriceps/Hamstrings/TA/EHL/FHL/GSC, + DP pulses, no pain with log roll, no pain on axial loading, compartments soft and compressible, calf nontender        IMAGING:  CT Pelvis: LEFT Iliac wing fracture            65y Female presents as Level 2 trauma with LEFT iliac wing fx s/p mechanical fall down stairs per EMS. Pt transported to SICU shortly after arrival to ED due to hypotension. Found to have L iliac wing fx. In SICU, pt does not remember how she hurt herself. ROS and PE limited 2/2 mental status. + left eye hematoma with laceration over the left temporal region repaired by SICU.     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    C Section    Abnormality, eye  h/o left eye vitrectomy    S/P D&amp;C (status post dilation and curettage)    H/O left mastectomy      Home Medications:  amLODIPine 2.5 mg oral tablet: 1 tab(s) orally once a day (2021 12:03)  Calcium 500+D oral tablet, chewable: 1 tab(s) orally 2 times a day (2021 12:03)  Synthroid 112 mcg (0.112 mg) oral tablet: 1 tab(s) orally once a day (2021 12:03)  tamoxifen 20 mg oral tablet: 1 tab(s) orally once a day (2021 12:03)    Allergies    Tapazole (Hives)    Intolerances                              13.0   18.77 )-----------( 236      ( 2021 10:45 )             39.6     04-19    142  |  107  |  19  ----------------------------<  163<H>  4.0   |  21<L>  |  0.62    Ca    9.0      2021 10:45    TPro  6.7  /  Alb  4.1  /  TBili  0.5  /  DBili  x   /  AST  39  /  ALT  37  /  AlkPhos  92  04-19    PT/INR - ( 2021 10:45 )   PT: 12.2 sec;   INR: 1.02 ratio         PTT - ( 2021 10:45 )  PTT:25.2 sec  Urinalysis Basic - ( 2021 11:42 )    Color: Colorless / Appearance: Clear / S.039 / pH: x  Gluc: x / Ketone: Small  / Bili: Negative / Urobili: Negative   Blood: x / Protein: Trace / Nitrite: Negative   Leuk Esterase: Negative / RBC: 4 /hpf / WBC 1 /HPF   Sq Epi: x / Non Sq Epi: 1 /hpf / Bacteria: Negative          Vital Signs Last 24 Hrs  T(C): 36 (2021 11:10), Max: 36 (2021 11:10)  T(F): 96.8 (2021 11:10), Max: 96.8 (2021 11:10)  HR: 70 (2021 11:10) (70 - 70)  BP: 133/75 (2021 11:10) (133/75 - 133/75)  BP(mean): 97 (2021 11:10) (97 - 97)  RR: 33 (2021 11:10) (33 - 33)  SpO2: 100% (2021 11:10) (100% - 100%)    PHYSICAL EXAM  General: NAD, Awake, Responsive but mildly confused    LEFT Lower Extremity:   ~.25cm superficial abrasion over anterolateral ilium, no active bleeding  No ecchymosis or swelling  No gross deformity   TTP over the left ilium  NTTP over the bony prominences of the hip/knee/ankle/foot/toes  L2-S1 SILT  Motor grossly intact throughout EHL/FHL/TA/GSC  Left iliac pain with passive/active ROM of hip  No pain with log roll or axial loading  +DP pulses  Calf nontender  Compartments soft and compressible    SECONDARY EXAM: ~4cm laceration over the left temporal region, left eye hematoma, sensation appears to be intact throughout, motor appears grossly intact throughout, compartments soft and compressible    SPINE: Skin intact, + scoliosis, no apparent bony tenderness or step-offs appreciated throughout cervical/thoracic/lumbar/sacral spine      RUE: Skin intact, no erythema, ecchymosis, edema, gross deformity, NTTP over the bony prominences of the shoulder/elbow/wrist/hand, painless passive/active ROM of the shoulder/elbow/wrist/hand, C5-T1 SILT, motor grossly intact throughout, + radial pulse    LUE: Skin intact, no erythema, + ecchymosis/edema/gross deformity of the fifth digit, No apparent TTP over the bony prominences of the shoulder/elbow/wrist/hand, painless passive/active ROM of the shoulder/elbow/wrist/hand, C5-T1 SILT, Pt has difficulty with active shoulder ROM with pain, motor otherwise grossly intact throughout, + radial pulse    RLE: Skin intact, no erythema, ecchymosis, edema, gross deformity, NTTP over the bony prominences of the hip/knee/ankle/foot, painless passive/active ROM of the hip/knee/ankle/foot, L2-S1 SILT, motor grossly intact throughout Hip Flexors/Quadriceps/Hamstrings/TA/EHL/FHL/GSC, + DP pulses, no pain with log roll, no pain on axial loading, compartments soft and compressible, calf nontender        IMAGING:  XR Pel: Ord  CT Pelvis: LEFT Iliac wing fracture            65y Female presents as Level 2 trauma with LEFT iliac wing fx s/p mechanical fall down stairs per EMS. Pt transported to SICU shortly after arrival to ED due to hypotension. Found to have L iliac wing fx. In SICU, pt does not remember how she hurt herself. ROS and PE limited 2/2 mental status. + left eye hematoma with laceration over the left temporal region repaired by SICU.     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    C Section    Abnormality, eye  h/o left eye vitrectomy    S/P D&amp;C (status post dilation and curettage)    H/O left mastectomy      Home Medications:  amLODIPine 2.5 mg oral tablet: 1 tab(s) orally once a day (2021 12:03)  Calcium 500+D oral tablet, chewable: 1 tab(s) orally 2 times a day (2021 12:03)  Synthroid 112 mcg (0.112 mg) oral tablet: 1 tab(s) orally once a day (2021 12:03)  tamoxifen 20 mg oral tablet: 1 tab(s) orally once a day (2021 12:03)    Allergies    Tapazole (Hives)    Intolerances                              13.0   18.77 )-----------( 236      ( 2021 10:45 )             39.6     04-19    142  |  107  |  19  ----------------------------<  163<H>  4.0   |  21<L>  |  0.62    Ca    9.0      2021 10:45    TPro  6.7  /  Alb  4.1  /  TBili  0.5  /  DBili  x   /  AST  39  /  ALT  37  /  AlkPhos  92  04-19    PT/INR - ( 2021 10:45 )   PT: 12.2 sec;   INR: 1.02 ratio         PTT - ( 2021 10:45 )  PTT:25.2 sec  Urinalysis Basic - ( 2021 11:42 )    Color: Colorless / Appearance: Clear / S.039 / pH: x  Gluc: x / Ketone: Small  / Bili: Negative / Urobili: Negative   Blood: x / Protein: Trace / Nitrite: Negative   Leuk Esterase: Negative / RBC: 4 /hpf / WBC 1 /HPF   Sq Epi: x / Non Sq Epi: 1 /hpf / Bacteria: Negative          Vital Signs Last 24 Hrs  T(C): 36 (2021 11:10), Max: 36 (2021 11:10)  T(F): 96.8 (2021 11:10), Max: 96.8 (2021 11:10)  HR: 70 (2021 11:10) (70 - 70)  BP: 133/75 (2021 11:10) (133/75 - 133/75)  BP(mean): 97 (2021 11:10) (97 - 97)  RR: 33 (2021 11:10) (33 - 33)  SpO2: 100% (2021 11:10) (100% - 100%)    PHYSICAL EXAM  General: NAD, Awake, Responsive but mildly confused    LEFT Lower Extremity:   ~.25cm superficial abrasion over anterolateral ilium, no active bleeding  No ecchymosis or swelling  No gross deformity   TTP over the left ilium  NTTP over the bony prominences of the hip/knee/ankle/foot/toes  L2-S1 SILT  Motor grossly intact throughout EHL/FHL/TA/GSC  Left iliac pain with passive/active ROM of hip  No pain with log roll or axial loading  +DP pulses  Calf nontender  Compartments soft and compressible    SECONDARY EXAM: ~4cm laceration over the left temporal region, left eye hematoma, sensation appears to be intact throughout, motor appears grossly intact throughout, compartments soft and compressible    SPINE: Skin intact, + scoliosis, no apparent bony tenderness or step-offs appreciated throughout cervical/thoracic/lumbar/sacral spine      RUE: Skin intact, no erythema, ecchymosis, edema, gross deformity, NTTP over the bony prominences of the shoulder/elbow/wrist/hand, painless passive/active ROM of the shoulder/elbow/wrist/hand, C5-T1 SILT, motor grossly intact throughout, + radial pulse    LUE: Skin intact, no erythema, + ecchymosis/edema/gross deformity of the fifth digit, No apparent TTP over the bony prominences of the shoulder/elbow/wrist/hand, painless passive/active ROM of the shoulder/elbow/wrist/hand, C5-T1 SILT, Pt has difficulty with active shoulder ROM with pain, motor otherwise grossly intact throughout, + radial pulse    RLE: Skin intact, no erythema, ecchymosis, edema, gross deformity, NTTP over the bony prominences of the hip/knee/ankle/foot, painless passive/active ROM of the hip/knee/ankle/foot, L2-S1 SILT, motor grossly intact throughout Hip Flexors/Quadriceps/Hamstrings/TA/EHL/FHL/GSC, + DP pulses, no pain with log roll, no pain on axial loading, compartments soft and compressible, calf nontender        IMAGING:  XR Clavicle: Minimally displaced distal third clavicle fracture  XR/CT Pelvis: LEFT Iliac wing fracture

## 2021-04-19 NOTE — BH CONSULTATION LIAISON ASSESSMENT NOTE - SUMMARY
Pt is a 64 y/o female, , lives with family, no pphx, hx breast cancer s/p mastectomy, HTN, hypothryoidism, not on full anticoagulation/antiplatelets per EMS, presenting as a level 2 trauma activation after a fall from a flight of stairs found to have (1) SAH, (2) left-sided rib fractures, (3) left iliac wing fracture with hematoma, (4) left forehead laceration, psych consulted for med mgt, delirium, depression.   Pt mostly nonverbal, answering few questions. Pt lethargic,not following commands. Pt denies pain, denies depressive symptoms. no si/hi. pt appears delirious, multifactorial etiology

## 2021-04-19 NOTE — CONSULT NOTE ADULT - SUBJECTIVE AND OBJECTIVE BOX
p ([id number])     HPI:  66 yo f pmh dementia, bibems w/ facial trauma s/p fall down flight of stairs. not reported on ac/ap. ems provides hx, pt not speaking in ed.    Imaging:  Trace b/l frontal tsah     Exam: AOx2, Pupils 3R, L eye bruising, FC x4, BUE ag strong, BLE move in plane of bed, pain craig.    --Anticoagulation:    =====================  PAST MEDICAL HISTORY   Hypothyroidism    Hyperthyroidism    Breast cancer      PAST SURGICAL HISTORY   Salivary Gland Disease    C Section    Abnormality, eye    S/P D&amp;C (status post dilation and curettage)    H/O left mastectomy      Tapazole (Hives)      MEDICATIONS:  Antibiotics:    Neuro:  acetaminophen  IVPB .. 1000 milliGRAM(s) IV Intermittent once  acetaminophen  IVPB .. 1000 milliGRAM(s) IV Intermittent once  acetaminophen  IVPB .. 1000 milliGRAM(s) IV Intermittent once  HYDROmorphone  Injectable 0.25 milliGRAM(s) IV Push every 3 hours PRN  levETIRAcetam  IVPB 500 milliGRAM(s) IV Intermittent every 12 hours    Other:  diphtheria/tetanus/pertussis (acellular) Vaccine (ADAcel) 0.5 milliLiter(s) IntraMuscular once  sodium chloride 0.9%. 1000 milliLiter(s) IV Continuous <Continuous>      SOCIAL HISTORY:   Occupation:   Marital Status:     FAMILY HISTORY:      ROS: Negative except per HPI    LABS:  PT/INR - ( 19 Apr 2021 10:45 )   PT: 12.2 sec;   INR: 1.02 ratio         PTT - ( 19 Apr 2021 10:45 )  PTT:25.2 sec                        13.0   18.77 )-----------( 236      ( 19 Apr 2021 10:45 )             39.6     04-19    142  |  107  |  19  ----------------------------<  163<H>  4.0   |  21<L>  |  0.62    Ca    9.0      19 Apr 2021 10:45    TPro  6.7  /  Alb  4.1  /  TBili  0.5  /  DBili  x   /  AST  39  /  ALT  37  /  AlkPhos  92  04-19

## 2021-04-19 NOTE — H&P ADULT - ATTENDING COMMENTS
Responded to Level 2 Trauma Team Activation  Care initiated by Dr. Griggs and signed out ot me during secondary survey in Trauma [place]  Elderly female s/p fall down stairs.  As per EMS moderate / large blood loss on scene  Patient with early dementia, forgetful although currently independent with ADLs    Protecting airway  Oxygenating well  SBP=90's   GCS= 13 (E4 V4 M5) to my exam  + left forehead laceration, approximately 4cm, slowly bleeding  + left chest wall tenderness  + left hand abrasions    CT-head -> scattered SAH, frontal skull fracture  CT-face -> left superior orbital fracture  CT-c-spine -> negative for spinal fracture  CT-CAP-> multiple left rib fractures, left iliac wing fracture with surrounding hematoma (no active extrav)    Plan-  Given extent of injuries, SICU admission  Orthopedic consult  Neurosurgery consult  Left forehead laceration to be washed out and closed by trauma team  Will likely need repeat CT-head  Hemorrhage Watch given pelvic fracture and surrounding hematoma  No chemical DVT prophylaxis at this time due to possible active bleeding  All care discussed with SICU team    Injuries and care discussed with son who was present in the ED

## 2021-04-19 NOTE — H&P ADULT - NSICDXPASTSURGICALHX_GEN_ALL_CORE_FT
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

## 2021-04-19 NOTE — CONSULT NOTE ADULT - ASSESSMENT
JONATHAN CHILD is a 65y Female with h/o HTN, breast CA s/p L mastectomy, hypothyroidism admitted as level II trauma s/p fall down stairs.  As per son, patient is able to care for herself at baseline.  Patient only able to say "ow" in the trauma bay.  Initial BP 140s, but decreased to 90s on repeat.  Started on IVF.  Secondary survey positive for L scalp laceration, L chest wall tenderness, and L pelvic tenderness.  Patient more conversive upon arrival to SICU, GCS 13 (E3, V4, M6).  Patient became hypotensive to SBP 90s and received additional 1L bolus.  Imaging revealed the following injuries: L nasal bone fracture; L lateral orbit wall fracture; small scattered B/L SAHs; minimally displaced L clavicular fracture; minimally displaced sternal fracture; L 3-8 rib fractures; small L pneumothorax; R 7th rib fracture; likely chronic 6th and 8th rib fracture; acute displaced, comminuted fracture of the L iliac wing, and a R renal lesion.  Patient also found to have large hematoma near the iliac wing fracture.  Patient will undergo hemorrhage watch while in SICU.     Neuro:  small scattered B/L traumautic SAHs  - Neuro checks q 1hr  - Keppra 500 q 12hrs x 7 days  - Repeat CTH in am   - Pain control with standing Tylenol, Lidoderm patch and Dilaudid prn   - cervical collar   - Neurosurgery following    Resp: Acute B/L rib fractures, small L pneumothorax  - O2 prn  - Encourage incentive spirometry  - f/u am CXR  - Pain control as above     CV: Hypotension, acute sternal fracture  - Hemodynamic monitoring  - Lactate 3.7, unchanged after 1L IVF, will bolus additional liter and repeat  - US for volume status  - EKG for sternal fracture     GI:   - NPO for now      /Renal: Incidental finding of R renal lesion  - Inform patient/HCP of incidental findings  - Trend BUN/Cr and UOP   - NS @ 100mL/hr     ID:  - No acute issues    Heme: Hematoma surrounding iliac wing fracture  - Hemorrhage watch protocol  - Holding chemical VTE prophylaxis   - SCDs     Endo:   - Monitor serum glucose     Lines:  - PIVs     MSK:  - Ortho following for iliac wing fracture  - Hand consult for possible L 5th finger fracture  - Plastics consult for nasal bone fracture     Dispo:  Full code. SICU care.  Hemorrhage watch protocol.  Case discussed with Dr. Munson.     BRIAN TurnerC #[id number]

## 2021-04-19 NOTE — H&P ADULT - NSICDXPASTMEDICALHX_GEN_ALL_CORE_FT
PAST MEDICAL HISTORY:  Breast cancer     Hyperthyroidism 1975- s/p radio active iodine RX    Hypothyroidism

## 2021-04-19 NOTE — ED PROVIDER NOTE - CARE PLAN
Principal Discharge DX:	Closed head injury  Secondary Diagnosis:	Laceration of forehead   Principal Discharge DX:	Closed head injury  Secondary Diagnosis:	Laceration of forehead  Secondary Diagnosis:	Pelvic fracture  Secondary Diagnosis:	SAH (subarachnoid hemorrhage)  Secondary Diagnosis:	Traumatic pneumothorax, initial encounter

## 2021-04-19 NOTE — CONSULT NOTE ADULT - ATTENDING COMMENTS
65y Female with h/o HTN, breast CA s/p L mastectomy , hypothyroidism admitted as level II trauma s/p fall down stairs.  As per son, patient is able to care for herself at baseline. Became more lethargic in ER started on IVF.  Secondary survey positive for L scalp laceration, L chest wall tenderness, and L pelvic tenderness.  Patient more 65y Female with h/o HTN, breast CA s/p L mastectomy , hypothyroidism admitted as level II trauma s/p fall down stairs.  As per son, patient is able to care for herself at baseline. Became more lethargic in ER started on IVF.  Secondary survey positive for L scalp laceration, L chest wall tenderness, and L pelvic tenderness.    Imaging revealed the following injuries: L nasal bone fracture; L lateral orbit wall fracture; small scattered B/L SAHs; minimally displaced L clavicular fracture; minimally displaced sternal fracture; L 3-8 rib fractures; small L pneumothorax; R 7th rib fracture; likely chronic 6th and 8th rib fracture; acute displaced, comminuted fracture of the L iliac wing, and a R renal lesion.  Patient also found to have large hematoma near the iliac wing fracture.      Pain control, f/U CT head, keppra for seizure ppx  F/U CXR  On hemorrhage watch protocol, monitor drop in HCT  Hemodynamically improved with Fluid  Monitor high LA  Plastic called for nasal bone and finger bone fx  Mechanical DVT ppx  PT  Discussed with Dr Farah

## 2021-04-19 NOTE — BH CONSULTATION LIAISON ASSESSMENT NOTE - CURRENT MEDICATION
MEDICATIONS  (STANDING):  acetaminophen  IVPB .. 1000 milliGRAM(s) IV Intermittent once  acetaminophen  IVPB .. 1000 milliGRAM(s) IV Intermittent once  chlorhexidine 2% Cloths 1 Application(s) Topical daily  levETIRAcetam  IVPB 500 milliGRAM(s) IV Intermittent every 12 hours  levothyroxine Injectable 84 MICROGram(s) IV Push at bedtime  lidocaine   Patch 1 Patch Transdermal daily  sodium chloride 0.9%. 1000 milliLiter(s) (100 mL/Hr) IV Continuous <Continuous>    MEDICATIONS  (PRN):  HYDROmorphone  Injectable 0.25 milliGRAM(s) IV Push every 3 hours PRN pain

## 2021-04-19 NOTE — ED PROVIDER NOTE - CLINICAL SUMMARY MEDICAL DECISION MAKING FREE TEXT BOX
nadege pgy3: 65 yof bibems s/p fall w/ facial trauma. hypotensive in ed. not responding to verbal stimuli however protecting airway. taken emergently to ct for eval for ich after stabilization. tba sicu. nadege pgy3: 65 yof bibems s/p fall w/ facial trauma. hypotensive in ed. not responding to verbal stimuli however protecting airway. taken emergently to ct for eval for ich after stabilization. tba sicu.  renee - 65 f with alzheimers 9 normally aox2 and verbal - sp fall down flight of stairs level 2 called for ams - pt [id number] moving all 4 withdraws to stimuli pupils equal l scalp lac and periorbital echymosis on l , no other ext signs of trauma abd soft bs bl [id number] bp drop from 140 to 90systolic - inc to 100 with ns -- fast neg concern for blood loss elsewhere -- ie at scene or pelvis - no anticaog -

## 2021-04-19 NOTE — BH CONSULTATION LIAISON ASSESSMENT NOTE - NSBHCHARTREVIEWVS_PSY_A_CORE FT
Vital Signs Last 24 Hrs  T(C): 36.6 (19 Apr 2021 15:00), Max: 36.6 (19 Apr 2021 15:00)  T(F): 97.9 (19 Apr 2021 15:00), Max: 97.9 (19 Apr 2021 15:00)  HR: 81 (19 Apr 2021 16:00) (65 - 81)  BP: 94/59 (19 Apr 2021 16:00) (91/52 - 133/75)  BP(mean): 72 (19 Apr 2021 16:00) (66 - 97)  RR: 19 (19 Apr 2021 16:00) (15 - 33)  SpO2: 96% (19 Apr 2021 16:00) (93% - 100%)

## 2021-04-19 NOTE — CONSULT NOTE ADULT - ASSESSMENT
65F s/p fall with multiple injuries including non displaced left superior orbital rim and non displaced left nasal bone fracture  - No indication for operative fixation of these non displaced fractures; recommend ice to face to aid in swelling and analgesia.  - Patient not entirely cooperative with exam, recommend reevaluation of EOM when mental status improves   - Sutures per primary  - No indication for antibiotics for these fractures    Brooke Queen PGY5

## 2021-04-19 NOTE — H&P ADULT - NSHPPHYSICALEXAM_GEN_ALL_CORE
General: NAD  HEENT: Normocephalic, left forehead laceration, ecchymotic left eye, PEERLA  Neck: Soft, midline trachea, C-collar in place  Chest: Left chest wall tenderness.   Cardiac: S1, S2, RRR  Respiratory: Bilateral breath sounds, clear and equal bilaterally  Abdomen: Soft, non-distended, non-tender, no rebound, no guarding, no masses palpated  Pelvis: Tender on left side, no ecchymosis  Ext: palp radial b/l UE, b/l DP palp in Lower Extrem, motor and sensory grossly intact in all 4 extremities  Back: no TTP, no palpable runoff/stepoff/deformity  Rectal: No jim blood, REENA with good tone General: NAD  HEENT: Normocephalic, left forehead laceration, ecchymotic left eye, PEERLA  Neck: Soft, midline trachea, C-collar in place  Chest: Left chest wall tenderness.   Cardiac: S1, S2, RRR  Respiratory: Bilateral breath sounds, clear and equal bilaterally  Abdomen: Soft, non-distended, non-tender, no rebound, no guarding, no masses palpated  Pelvis: Tender on left side, no ecchymosis  Ext: palp radial b/l UE, b/l DP palp in Lower Extrem, motor and sensory grossly intact in all 4 extremities, left 5th digit edema / tenderness  Back: no TTP, no palpable runoff/stepoff/deformity  Rectal: No jim blood, REENA with good tone

## 2021-04-19 NOTE — BH CONSULTATION LIAISON ASSESSMENT NOTE - RISK ASSESSMENT
Patient taking tylenol and ibuprofen for pain.   Patient states she had not taken anything for pain today Pt overall low risk for suicide attempt, no si/hi, no hx attempts, future oriented, risk factors include med issues

## 2021-04-19 NOTE — H&P ADULT - HISTORY OF PRESENT ILLNESS
65F hx breast cancer s/p mastectomy, HTN, hypothryoidism, not on full anticoagulation/antiplatelets per EMS, presenting as a level 2 trauma activation after a fall from a flight of stairs. Patient only able to say "ow" in trauma bay, unable to provide further history. However per son, normal mentation at baseline.     In the trauma bay, airway was intact with bilateral breath sounds, O2 saturation 100% on room air. Initially blood pressure was 140s systolic however on repeat 90s. NS@100 initiated. Secondary significant for left forehead laceration, left chest wall tenderness, left pelvic tenderness.

## 2021-04-20 LAB
ANION GAP SERPL CALC-SCNC: 13 MMOL/L — SIGNIFICANT CHANGE UP (ref 5–17)
BUN SERPL-MCNC: 15 MG/DL — SIGNIFICANT CHANGE UP (ref 7–23)
CALCIUM SERPL-MCNC: 7.7 MG/DL — LOW (ref 8.4–10.5)
CHLORIDE SERPL-SCNC: 111 MMOL/L — HIGH (ref 96–108)
CO2 SERPL-SCNC: 17 MMOL/L — LOW (ref 22–31)
COVID-19 SPIKE DOMAIN AB INTERP: POSITIVE
COVID-19 SPIKE DOMAIN ANTIBODY RESULT: >250 U/ML — HIGH
CREAT SERPL-MCNC: 0.44 MG/DL — LOW (ref 0.5–1.3)
GAS PNL BLDV: SIGNIFICANT CHANGE UP
GLUCOSE SERPL-MCNC: 167 MG/DL — HIGH (ref 70–99)
HCT VFR BLD CALC: 23 % — LOW (ref 34.5–45)
HCT VFR BLD CALC: 23.5 % — LOW (ref 34.5–45)
HCT VFR BLD CALC: 27.4 % — LOW (ref 34.5–45)
HCV AB S/CO SERPL IA: 0.07 S/CO — SIGNIFICANT CHANGE UP (ref 0–0.99)
HCV AB SERPL-IMP: SIGNIFICANT CHANGE UP
HGB BLD-MCNC: 7.7 G/DL — LOW (ref 11.5–15.5)
HGB BLD-MCNC: 7.8 G/DL — LOW (ref 11.5–15.5)
HGB BLD-MCNC: 8.8 G/DL — LOW (ref 11.5–15.5)
MAGNESIUM SERPL-MCNC: 1.5 MG/DL — LOW (ref 1.6–2.6)
MCHC RBC-ENTMCNC: 28.8 PG — SIGNIFICANT CHANGE UP (ref 27–34)
MCHC RBC-ENTMCNC: 29.1 PG — SIGNIFICANT CHANGE UP (ref 27–34)
MCHC RBC-ENTMCNC: 29.3 PG — SIGNIFICANT CHANGE UP (ref 27–34)
MCHC RBC-ENTMCNC: 32.1 GM/DL — SIGNIFICANT CHANGE UP (ref 32–36)
MCHC RBC-ENTMCNC: 33.2 GM/DL — SIGNIFICANT CHANGE UP (ref 32–36)
MCHC RBC-ENTMCNC: 33.5 GM/DL — SIGNIFICANT CHANGE UP (ref 32–36)
MCV RBC AUTO: 87.5 FL — SIGNIFICANT CHANGE UP (ref 80–100)
MCV RBC AUTO: 87.7 FL — SIGNIFICANT CHANGE UP (ref 80–100)
MCV RBC AUTO: 89.5 FL — SIGNIFICANT CHANGE UP (ref 80–100)
NRBC # BLD: 0 /100 WBCS — SIGNIFICANT CHANGE UP (ref 0–0)
PCP SPEC-MCNC: SIGNIFICANT CHANGE UP
PHOSPHATE SERPL-MCNC: 2.5 MG/DL — SIGNIFICANT CHANGE UP (ref 2.5–4.5)
PLATELET # BLD AUTO: 100 K/UL — LOW (ref 150–400)
PLATELET # BLD AUTO: 101 K/UL — LOW (ref 150–400)
PLATELET # BLD AUTO: 115 K/UL — LOW (ref 150–400)
POTASSIUM SERPL-MCNC: 3.7 MMOL/L — SIGNIFICANT CHANGE UP (ref 3.5–5.3)
POTASSIUM SERPL-SCNC: 3.7 MMOL/L — SIGNIFICANT CHANGE UP (ref 3.5–5.3)
RBC # BLD: 2.63 M/UL — LOW (ref 3.8–5.2)
RBC # BLD: 2.68 M/UL — LOW (ref 3.8–5.2)
RBC # BLD: 3.06 M/UL — LOW (ref 3.8–5.2)
RBC # FLD: 13.5 % — SIGNIFICANT CHANGE UP (ref 10.3–14.5)
RBC # FLD: 13.5 % — SIGNIFICANT CHANGE UP (ref 10.3–14.5)
RBC # FLD: 13.6 % — SIGNIFICANT CHANGE UP (ref 10.3–14.5)
SARS-COV-2 IGG+IGM SERPL QL IA: >250 U/ML — HIGH
SARS-COV-2 IGG+IGM SERPL QL IA: POSITIVE
SODIUM SERPL-SCNC: 141 MMOL/L — SIGNIFICANT CHANGE UP (ref 135–145)
WBC # BLD: 10.35 K/UL — SIGNIFICANT CHANGE UP (ref 3.8–10.5)
WBC # BLD: 10.67 K/UL — HIGH (ref 3.8–10.5)
WBC # BLD: 11.03 K/UL — HIGH (ref 3.8–10.5)
WBC # FLD AUTO: 10.35 K/UL — SIGNIFICANT CHANGE UP (ref 3.8–10.5)
WBC # FLD AUTO: 10.67 K/UL — HIGH (ref 3.8–10.5)
WBC # FLD AUTO: 11.03 K/UL — HIGH (ref 3.8–10.5)

## 2021-04-20 PROCEDURE — 70450 CT HEAD/BRAIN W/O DYE: CPT | Mod: 26

## 2021-04-20 PROCEDURE — 99233 SBSQ HOSP IP/OBS HIGH 50: CPT

## 2021-04-20 PROCEDURE — 71045 X-RAY EXAM CHEST 1 VIEW: CPT | Mod: 26

## 2021-04-20 RX ORDER — MAGNESIUM SULFATE 500 MG/ML
2 VIAL (ML) INJECTION
Refills: 0 | Status: COMPLETED | OUTPATIENT
Start: 2021-04-20 | End: 2021-04-20

## 2021-04-20 RX ORDER — CALCIUM GLUCONATE 100 MG/ML
2 VIAL (ML) INTRAVENOUS ONCE
Refills: 0 | Status: COMPLETED | OUTPATIENT
Start: 2021-04-20 | End: 2021-04-20

## 2021-04-20 RX ORDER — SODIUM CHLORIDE 9 MG/ML
1000 INJECTION INTRAMUSCULAR; INTRAVENOUS; SUBCUTANEOUS
Refills: 0 | Status: DISCONTINUED | OUTPATIENT
Start: 2021-04-20 | End: 2021-04-21

## 2021-04-20 RX ORDER — ESCITALOPRAM OXALATE 10 MG/1
10 TABLET, FILM COATED ORAL DAILY
Refills: 0 | Status: DISCONTINUED | OUTPATIENT
Start: 2021-04-20 | End: 2021-04-27

## 2021-04-20 RX ORDER — POTASSIUM CHLORIDE 20 MEQ
10 PACKET (EA) ORAL
Refills: 0 | Status: COMPLETED | OUTPATIENT
Start: 2021-04-20 | End: 2021-04-20

## 2021-04-20 RX ORDER — ACETAMINOPHEN 500 MG
1000 TABLET ORAL EVERY 6 HOURS
Refills: 0 | Status: COMPLETED | OUTPATIENT
Start: 2021-04-20 | End: 2021-04-21

## 2021-04-20 RX ADMIN — Medication 1000 MILLIGRAM(S): at 05:14

## 2021-04-20 RX ADMIN — Medication 84 MICROGRAM(S): at 21:39

## 2021-04-20 RX ADMIN — HYDROMORPHONE HYDROCHLORIDE 0.25 MILLIGRAM(S): 2 INJECTION INTRAMUSCULAR; INTRAVENOUS; SUBCUTANEOUS at 09:15

## 2021-04-20 RX ADMIN — Medication 400 MILLIGRAM(S): at 11:37

## 2021-04-20 RX ADMIN — HYDROMORPHONE HYDROCHLORIDE 0.25 MILLIGRAM(S): 2 INJECTION INTRAMUSCULAR; INTRAVENOUS; SUBCUTANEOUS at 09:03

## 2021-04-20 RX ADMIN — Medication 400 MILLIGRAM(S): at 05:05

## 2021-04-20 RX ADMIN — Medication 50 GRAM(S): at 01:03

## 2021-04-20 RX ADMIN — Medication 400 MILLIGRAM(S): at 17:36

## 2021-04-20 RX ADMIN — Medication 50 GRAM(S): at 03:04

## 2021-04-20 RX ADMIN — Medication 400 MILLIGRAM(S): at 23:01

## 2021-04-20 RX ADMIN — LIDOCAINE 1 PATCH: 4 CREAM TOPICAL at 18:36

## 2021-04-20 RX ADMIN — LIDOCAINE 1 PATCH: 4 CREAM TOPICAL at 01:00

## 2021-04-20 RX ADMIN — Medication 100 MILLIEQUIVALENT(S): at 01:03

## 2021-04-20 RX ADMIN — Medication 1000 MILLIGRAM(S): at 11:42

## 2021-04-20 RX ADMIN — LIDOCAINE 1 PATCH: 4 CREAM TOPICAL at 11:37

## 2021-04-20 RX ADMIN — LEVETIRACETAM 400 MILLIGRAM(S): 250 TABLET, FILM COATED ORAL at 05:05

## 2021-04-20 RX ADMIN — CHLORHEXIDINE GLUCONATE 1 APPLICATION(S): 213 SOLUTION TOPICAL at 05:06

## 2021-04-20 RX ADMIN — Medication 1000 MILLIGRAM(S): at 23:30

## 2021-04-20 RX ADMIN — LEVETIRACETAM 400 MILLIGRAM(S): 250 TABLET, FILM COATED ORAL at 17:36

## 2021-04-20 RX ADMIN — Medication 100 MILLIEQUIVALENT(S): at 03:58

## 2021-04-20 RX ADMIN — ESCITALOPRAM OXALATE 10 MILLIGRAM(S): 10 TABLET, FILM COATED ORAL at 11:37

## 2021-04-20 RX ADMIN — Medication 100 MILLIEQUIVALENT(S): at 02:04

## 2021-04-20 RX ADMIN — LIDOCAINE 1 PATCH: 4 CREAM TOPICAL at 23:09

## 2021-04-20 RX ADMIN — SODIUM CHLORIDE 50 MILLILITER(S): 9 INJECTION INTRAMUSCULAR; INTRAVENOUS; SUBCUTANEOUS at 18:30

## 2021-04-20 RX ADMIN — SODIUM CHLORIDE 100 MILLILITER(S): 9 INJECTION INTRAMUSCULAR; INTRAVENOUS; SUBCUTANEOUS at 09:03

## 2021-04-20 RX ADMIN — Medication 1000 MILLIGRAM(S): at 18:17

## 2021-04-20 RX ADMIN — Medication 125 MILLIMOLE(S): at 05:05

## 2021-04-20 RX ADMIN — Medication 200 GRAM(S): at 11:37

## 2021-04-20 RX ADMIN — SODIUM CHLORIDE 50 MILLILITER(S): 9 INJECTION INTRAMUSCULAR; INTRAVENOUS; SUBCUTANEOUS at 19:30

## 2021-04-20 RX ADMIN — SODIUM CHLORIDE 100 MILLILITER(S): 9 INJECTION INTRAMUSCULAR; INTRAVENOUS; SUBCUTANEOUS at 05:05

## 2021-04-20 NOTE — PHYSICAL THERAPY INITIAL EVALUATION ADULT - IMPAIRMENTS FOUND, PT EVAL
aerobic capacity/endurance/cognitive impairment/gait, locomotion, and balance/muscle strength/posture/visual motor

## 2021-04-20 NOTE — PHYSICAL THERAPY INITIAL EVALUATION ADULT - ADDITIONAL COMMENTS
As per SW note: Pt resides in a PH with her spouse and son. Ambulated with use of R/W/SC outside of the home. Has HHA for IADLS including grocery shopping and laundry, was (I) with all ADLS

## 2021-04-20 NOTE — PHYSICAL THERAPY INITIAL EVALUATION ADULT - IMPAIRMENTS CONTRIBUTING IMPAIRED BED MOBILITY, REHAB EVAL
impaired balance/cognition/impaired coordination/pain/impaired postural control/decreased ROM/decreased strength

## 2021-04-20 NOTE — PHYSICAL THERAPY INITIAL EVALUATION ADULT - IMPAIRED TRANSFERS: SIT/STAND, REHAB EVAL
impaired balance/cognition/impaired coordination/impaired motor control/pain/impaired postural control/decreased ROM/decreased strength

## 2021-04-20 NOTE — PROGRESS NOTE ADULT - ASSESSMENT
65F hx breast cancer s/p mastectomy, HTN, hypothryoidism, not on full anticoagulation/antiplatelets per EMS, presenting as a level 2 trauma activation after a fall from a flight of stairs found to have (1) SAH, (2) left-sided rib fractures, (3) left iliac wing fracture with hematoma, (4) left forehead laceration s/p successful closed reduction of left pinky PIP dislocation     - Appreciate ortho consult  - Neuro checks q 4hr  - Keppra 500 q 12hrs x 7 days  - Repeat CTH this am   - Pain control with standing Tylenol, Lidoderm patch and Dilaudid prn   - Incidental R renal lesion to be discussed  - Will require C-collar clearance (currently with distracting injury)  - Forehead laceration repair at bedside  - Appreciate plastics eval  - Tertiary exam  - Will obtain home medications from son  - Hold chemical VTE ppx for now, SCDs  - PT evaluation after full evaluation    [phone]

## 2021-04-20 NOTE — PHYSICAL THERAPY INITIAL EVALUATION ADULT - MANUAL MUSCLE TESTING RESULTS, REHAB EVAL
RUE and B/L LEs grossly at least 3/5, LUE not tested formally 2/2 clavicle fx/grossly assessed due to

## 2021-04-20 NOTE — OCCUPATIONAL THERAPY INITIAL EVALUATION ADULT - PERTINENT HX OF CURRENT PROBLEM, REHAB EVAL
65F hx breast cancer s/p mastectomy, HTN, hypothryoidism, not on full anticoagulation/antiplatelets per EMS, presenting as a level 2 trauma activation after a fall from a flight of stairs. Patient only able to say "ow" in trauma bay, unable to provide further history. However per son, normal mentation at baseline.

## 2021-04-20 NOTE — CONSULT NOTE ADULT - SUBJECTIVE AND OBJECTIVE BOX
See dictated note.  Now s/p successful closed reduction of left pinky PIP dislocation by SICU resident, properly delifn-taped to the ring finger.  Rec: Cont delfin taping for now.  Moisturize facial wounds  F/u next wk as outpt.

## 2021-04-20 NOTE — PROGRESS NOTE ADULT - SUBJECTIVE AND OBJECTIVE BOX
ACS DAILY PROGRESS NOTE:       SUBJECTIVE/ROS: Patient seen and examined on rounds- AxOx1    Interval/Overnight Events:  - Passed hemorrhage watch  - Stable repeat head CT  - CXR with unchanged pneumothorax  - Poor mental status, AxOx1 to person only        MEDICATIONS  (STANDING):  acetaminophen  IVPB .. 1000 milliGRAM(s) IV Intermittent every 6 hours  chlorhexidine 2% Cloths 1 Application(s) Topical <User Schedule>  levETIRAcetam  IVPB 500 milliGRAM(s) IV Intermittent every 12 hours  levothyroxine Injectable 84 MICROGram(s) IV Push at bedtime  lidocaine   Patch 1 Patch Transdermal daily  sodium chloride 0.9%. 1000 milliLiter(s) (100 mL/Hr) IV Continuous <Continuous>    MEDICATIONS  (PRN):  HYDROmorphone  Injectable 0.25 milliGRAM(s) IV Push every 3 hours PRN Severe Pain (7 - 10)      OBJECTIVE:    Vital Signs Last 24 Hrs  T(C): 37.1 (2021 03:00), Max: 37.7 (2021 23:00)  T(F): 98.8 (2021 03:00), Max: 99.9 (2021 23:00)  HR: 83 (2021 06:00) (65 - 104)  BP: 102/58 (2021 06:00) (89/52 - 133/75)  BP(mean): 74 (2021 06:00) (65 - 97)  RR: 19 (2021 06:00) (15 - 34)  SpO2: 97% (2021 06:00) (90% - 100%)        I&O's Detail    2021 07:01  -  2021 07:00  --------------------------------------------------------  IN:    IV PiggyBack: 1250 mL    IV PiggyBack: 200 mL    sodium chloride 0.9%: 2000 mL    Sodium Chloride 0.9% Bolus: 3000 mL  Total IN: 6450 mL    OUT:    Indwelling Catheter - Urethral (mL): 2030 mL  Total OUT: 2030 mL    Total NET: 4420 mL          Daily Height in cm: 170.18 (2021 11:10)    Daily Weight in k.2 (2021 00:33)    LABS:                        9.2    11.09 )-----------( 123      ( 2021 23:31 )             28.4     04-19    141  |  111<H>  |  15  ----------------------------<  167<H>  3.7   |  17<L>  |  0.44<L>    Ca    7.7<L>      2021 23:31  Phos  2.5     -  Mg     1.5     -    TPro  6.7  /  Alb  4.1  /  TBili  0.5  /  DBili  x   /  AST  39  /  ALT  37  /  AlkPhos  92  04-19    PT/INR - ( 2021 23:31 )   PT: 13.5 sec;   INR: 1.13 ratio         PTT - ( 2021 23:31 )  PTT:28.0 sec  Urinalysis Basic - ( 2021 11:42 )    Color: Colorless / Appearance: Clear / S.039 / pH: x  Gluc: x / Ketone: Small  / Bili: Negative / Urobili: Negative   Blood: x / Protein: Trace / Nitrite: Negative   Leuk Esterase: Negative / RBC: 4 /hpf / WBC 1 /HPF   Sq Epi: x / Non Sq Epi: 1 /hpf / Bacteria: Negative                PHYSICAL EXAM:  HEENT: Normocephalic, left forehead laceration, ecchymotic left eye, PEERLA  Neck: Soft, midline trachea, C-collar in place  Chest: Left chest wall tenderness.   Cardiac: S1, S2, RRR  Respiratory: Bilateral breath sounds, clear and equal bilaterally  Abdomen: Soft, non-distended, non-tender, no rebound, no guarding, no masses palpated  Pelvis: Tender on left side, no ecchymosis  Ext: palp radial b/l UE, b/l DP palp in Lower Extrem, motor and sensory grossly intact in all 4 extremities, left 5th digit edema / tenderness  Back: no TTP, no palpable runoff/stepoff/deformity

## 2021-04-20 NOTE — OCCUPATIONAL THERAPY INITIAL EVALUATION ADULT - PRECAUTIONS/LIMITATIONS, REHAB EVAL
Xray LEFT SHOULDER:Redemonstrated displaced obliquely oriented distal left clavicular shaft fracture at coracoid process level however without associated AC joint malalignment.LEFT HAND: Successfully reduced and anatomically realigned previously dislocated left 5th PIP joint. No associated this with bone fracture fragments.CT head: Left frontal scalp hematoma and laceration. Nondisplaced fracture of the anterior-inferior aspect of the left frontal bone extending into the lateral aspect of the superior rim of the left orbit. No radiopaque foreign body. Xray LEFT SHOULDER:Redemonstrated displaced obliquely oriented distal left clavicular shaft fracture at coracoid process level however without associated AC joint malalignment.LEFT HAND: Successfully reduced and anatomically realigned previously dislocated left 5th PIP joint. No associated this with bone fracture fragments.CT head: Left frontal scalp hematoma and laceration. Nondisplaced fracture of the anterior-inferior aspect of the left frontal bone extending into the lateral aspect of the superior rim of the left orbit. No radiopaque foreign body./fall precautions

## 2021-04-20 NOTE — PROGRESS NOTE ADULT - SUBJECTIVE AND OBJECTIVE BOX
Patient seen and examined at bedside. ROS and PE limited 2/2 pt's mental status. Pt still slow to respond and not cooperative with exam. Pain appears to be well controlled.     LABS:                        9.2    11.09 )-----------( 123      ( 2021 23:31 )             28.4         141  |  111<H>  |  15  ----------------------------<  167<H>  3.7   |  17<L>  |  0.44<L>    Ca    7.7<L>      2021 23:31  Phos  2.5       Mg     1.5         TPro  6.7  /  Alb  4.1  /  TBili  0.5  /  DBili  x   /  AST  39  /  ALT  37  /  AlkPhos  92      PT/INR - ( 2021 23:31 )   PT: 13.5 sec;   INR: 1.13 ratio         PTT - ( 2021 23:31 )  PTT:28.0 sec  Urinalysis Basic - ( 2021 11:42 )    Color: Colorless / Appearance: Clear / S.039 / pH: x  Gluc: x / Ketone: Small  / Bili: Negative / Urobili: Negative   Blood: x / Protein: Trace / Nitrite: Negative   Leuk Esterase: Negative / RBC: 4 /hpf / WBC 1 /HPF   Sq Epi: x / Non Sq Epi: 1 /hpf / Bacteria: Negative          Vitals  T(C): 37.1 (21 @ 03:00), Max: 37.7 (21 @ 23:00)  HR: 98 (21 @ 03:00) (65 - 104)  BP: 101/62 (21 @ 03:00) (89/52 - 133/75)  RR: 19 (21 @ 03:00) (15 - 34)  SpO2: 96% (21 @ 03:00) (90% - 100%)      PHYSICAL EXAM  General: NAD, Awake and Alert    LEFT Upper Extremity:   Skin intact  C5-T1 sensation appears to be intact   Grossly moves extremity   Unable to adequately assess motor and sensory function 2/2 patient's mental status  + Radial pulses  Compartments soft and compressible      LEFT Lower Extremity:  +Mild ecchymosis about the iliac region  L2-S1 sensation appears to be intact   +TA/EHL/FHL/GSC  + DP pulses  Compartments soft and compressible  Calves nontender

## 2021-04-20 NOTE — PHYSICAL THERAPY INITIAL EVALUATION ADULT - ACTIVE RANGE OF MOTION EXAMINATION, REHAB EVAL
L tomer not tested/Left UE Active ROM was WFL (within functional limits)/Right UE Active ROM was WFL (within functional limits)/Left LE Active ROM was WFL (within functional limits)/Right LE Active ROM was WFL (within functional limits)

## 2021-04-20 NOTE — PROGRESS NOTE ADULT - ASSESSMENT
Assessment/Plan:  65y Female with LEFT Iliac wing fracture, left minimally displaced clavicle fracture    -Pain control as needed  -WBAT of the affected left lower extremity with walker   -WBAT L shoulder, pendulum exercises okay, no lifting, sling for comfort  -Encourage ROM of elbow/wrist/hand  -PT/OT  -No orthopedic surgical intervention planned at this time  -Medical management per SICU  -Discussed with attending, who agrees with plan   Assessment/Plan:  65y Female with LEFT Iliac wing fracture, left minimally displaced clavicle fracture    -Pain control as needed  -WBAT of the affected left lower extremity with walker   -NWRIZWANA LUE in sling  -Encourage ROM of elbow/wrist/hand  -PT/OT  -No orthopedic surgical intervention planned at this time  -Medical management per SICU  -Discussed with attending, who agrees with plan

## 2021-04-20 NOTE — PROGRESS NOTE ADULT - ASSESSMENT
JONATHAN CHILD is a 65y Female with h/o HTN, breast CA s/p L mastectomy, hypothyroidism admitted as level II trauma s/p fall down stairs.  As per son, patient is able to care for herself at baseline.  Patient only able to say "ow" in the trauma bay.  Initial BP 140s, but decreased to 90s on repeat.  Started on IVF.  Secondary survey positive for L scalp laceration, L chest wall tenderness, and L pelvic tenderness.  Patient more conversive upon arrival to SICU, GCS 13 (E3, V4, M6).  Patient became hypotensive to SBP 90s and received additional 1L bolus.  Imaging revealed the following injuries: L nasal bone fracture; L lateral orbit wall fracture; small scattered B/L SAHs; minimally displaced L clavicular fracture; minimally displaced sternal fracture; L 3-8 rib fractures; small L pneumothorax; R 7th rib fracture; likely chronic 6th and 8th rib fracture; acute displaced, comminuted fracture of the L iliac wing, and a R renal lesion.  Patient also found to have large hematoma near the iliac wing fracture.  Patient now s/p hemorrhage watch. Head CT stable.     Neuro:  small scattered B/L traumatic SAHs  - Neuro checks q 1hr  - Keppra 500 q 12hrs x 7 days  - Repeat CTH in am   - Pain control with standing Tylenol, Lidoderm patch and Dilaudid prn   - Cervical collar   - Neurosurgery following    Resp: Acute B/L rib fractures, small L pneumothorax  - Encourage incentive spirometry  - f/u am CXR  - Pain control as above     CV: Hypotension, acute sternal fracture  - Hemodynamic monitoring  - Lactate downtrended to 2.5   - EKG for sternal fracture WNL    GI:   - NPO as patient too lethargic for eating     /Renal: Incidental finding of R renal lesion  - Inform patient/HCP of incidental findings  - NS @ 100mL/hr     ID:  - No acute issues    Heme: Hematoma surrounding iliac wing fracture  - Hemorrhage watch passed  - Stable H/H, now CBC Q8  - Holding chemical VTE prophylaxis for head bleed    - SCDs     Endo:   - Monitor serum glucose   - Follow up morning A1c    Lines:  - PIVs     MSK:  - Ortho following for iliac wing fracture  - Hand consult for possible L 5th finger fracture  - Plastics consult for nasal bone fracture

## 2021-04-20 NOTE — OCCUPATIONAL THERAPY INITIAL EVALUATION ADULT - NS ASR FOLLOW COMMAND OT EVAL
waxing/waning throughout, slow processing speed, minimally verbal/75% of the time/50% of the time/able to follow single-step instructions

## 2021-04-20 NOTE — PROGRESS NOTE ADULT - SUBJECTIVE AND OBJECTIVE BOX
SICU Daily Progress Note  =====================================================  Interval/Overnight Events:  - Passed hemorrhage watch  - Stable repeat head CT  - CXR with unchanged pneumothorax  - Poor mental status, AxOx1 to person only     HPI: 65y Female with h/o HTN, breast CA s/p L mastectomy, hypothyroidism admitted as level II trauma s/p fall down stairs.  As per son, patient is able to care for herself at baseline.  Patient only able to say "ow" in the trauma bay.  Initial BP 140s, but decreased to 90s on repeat.  Started on IVF.  Secondary survey positive for L scalp laceration, L chest wall tenderness, and L pelvic tenderness.  Patient more conversive upon arrival to SICU, GCS 13 (E3, V4, M6).  Patient became hypotensive to SBP 90s and received additional 1L bolus.  Imaging revealed the following injuries: L nasal bone fracture; L lateral orbit wall fracture; small scattered B/L SAHs; minimally displaced L clavicular fracture; minimally displaced sternal fracture; L 3-8 rib fractures; small L pneumothorax; R 7th rib fracture; likely chronic 6th and 8th rib fracture; acute displaced, comminuted fracture of the L iliac wing, and a R renal lesion.  Patient also found to have large hematoma near the iliac wing fracture.        MEDICATIONS:   --------------------------------------------------------------------------------------  Neurologic Medications  acetaminophen  IVPB .. 1000 milliGRAM(s) IV Intermittent every 6 hours  HYDROmorphone  Injectable 0.25 milliGRAM(s) IV Push every 3 hours PRN Severe Pain (7 - 10)  levETIRAcetam  IVPB 500 milliGRAM(s) IV Intermittent every 12 hours    Respiratory Medications    Cardiovascular Medications    Gastrointestinal Medications  sodium chloride 0.9%. 1000 milliLiter(s) IV Continuous <Continuous>    Genitourinary Medications    Hematologic/Oncologic Medications    Antimicrobial/Immunologic Medications    Endocrine/Metabolic Medications  levothyroxine Injectable 84 MICROGram(s) IV Push at bedtime    Topical/Other Medications  chlorhexidine 2% Cloths 1 Application(s) Topical <User Schedule>  lidocaine   Patch 1 Patch Transdermal daily    --------------------------------------------------------------------------------------    VITAL SIGNS, INS/OUTS (last 24 hours):  --------------------------------------------------------------------------------------  Vital Signs Last 24 Hrs  T(C): 37.1 (2021 03:00), Max: 37.7 (2021 23:00)  T(F): 98.8 (2021 03:00), Max: 99.9 (2021 23:00)  HR: 96 (2021 05:00) (65 - 104)  BP: 114/65 (2021 05:00) (89/52 - 133/75)  BP(mean): 82 (2021 05:00) (65 - 97)  RR: 22 (2021 05:00) (15 - 34)  SpO2: 94% (2021 05:00) (90% - 100%)  --------------------------------------------------------------------------------------    EXAM  NEUROLOGY   Exam: AxOx1    RESPIRATORY  Exam: Nonlabored, CTABL, no wheezes, ronchi, or rales. Normal respiratory effort. 2L NC    CARDIOVASCULAR  Exam: Normotensive, RRR, no M/R/G     GI/NUTRITION  Exam: Abdomen soft, NT/ND, no rebound or guarding.  Current Diet:  NPO    VASCULAR  Exam: Extremities warm, well-perfused. Adequate capillary refill.     HEMATOLOGIC  [x] VTE Prophylaxis:       INFECTIOUS DISEASE  Antimicrobials/Immunologic Medications:      Tubes/Lines/Drains   [x] Peripheral IV  [] Central Venous Line     	[] R	[] L	[] IJ	[] Fem	[] SC	Date Placed:   [] Arterial Line		[] R	[] L	[] Fem	[] Rad	[] Ax	Date Placed:   [] PICC		[] Midline		[] Mediport  [] Urinary Catheter		  [x] Necessity of urinary, arterial, and venous catheters discussed    LABS  --------------------------------------------------------------------------------------                        9.2    11.09 )-----------( 123      ( 2021 23:31 )             28.4     04-19    141  |  111<H>  |  15  ----------------------------<  167<H>  3.7   |  17<L>  |  0.44<L>    Ca    7.7<L>      2021 23:31  Phos  2.5     -  Mg     1.5         TPro  6.7  /  Alb  4.1  /  TBili  0.5  /  DBili  x   /  AST  39  /  ALT  37  /  AlkPhos  92  04-19    PT/INR - ( 2021 23:31 )   PT: 13.5 sec;   INR: 1.13 ratio         PTT - ( 2021 23:31 )  PTT:28.0 sec  Urinalysis Basic - ( 2021 11:42 )    Color: Colorless / Appearance: Clear / S.039 / pH: x  Gluc: x / Ketone: Small  / Bili: Negative / Urobili: Negative   Blood: x / Protein: Trace / Nitrite: Negative   Leuk Esterase: Negative / RBC: 4 /hpf / WBC 1 /HPF   Sq Epi: x / Non Sq Epi: 1 /hpf / Bacteria: Negative      --------------------------------------------------------------------------------------

## 2021-04-20 NOTE — PHYSICAL THERAPY INITIAL EVALUATION ADULT - GAIT DEVIATIONS NOTED, PT EVAL
decreased juliann/increased time in double stance/decreased step length/decreased stride length/decreased weight-shifting ability

## 2021-04-20 NOTE — OCCUPATIONAL THERAPY INITIAL EVALUATION ADULT - ADDITIONAL COMMENTS
CTH: Small scattered areas of acute subarachnoid hemorrhage in the bilateral cerebral hemispheres.CT cervical spine: No acute fracture or traumatic subluxation of the cervical spine. Small left hemopneumothorax. Displaced fractures of the left posterior third and fifth ribs.CT maxillofacial bones: Left periorbital soft tissue hematoma. Nondisplaced fracture of the left nasal bone. Nondisplaced fracture of the lateral aspect of the superior rim of the left orbit. No radiopaque foreign body. Intraorbital contents are intact.

## 2021-04-20 NOTE — PHYSICAL THERAPY INITIAL EVALUATION ADULT - SIT-TO-STAND BALANCE
New Patient Chart Prep    Anne Marie Matamoros  is scheduled to see Tom Carvalho 1/19/2018 The following information is needed for the upcoming appointment:   -Progress Notes   -Labs   -Images     The following steps are beeing taken to obtain needed information:     -Progress Notes/Lab/Reports obtained through Care Everywhere N/A  -PCP/Referring provider office called: N/A    Referring provider is an [place] provider. Notes are available in the [facility].     FACILITY [facility], PHONE NUMBER CALLED [phone]    Spoke with: Jennifer from Radiology   Were images or labs taken in preparation to appointment? Yes    Where? [facility]    Are they viewable? NoCan they be pushed? Yes   Based on information, follow up is needed? Yes - Please check if images are in system       poor balance

## 2021-04-20 NOTE — PHYSICAL THERAPY INITIAL EVALUATION ADULT - PERTINENT HX OF CURRENT PROBLEM, REHAB EVAL
65 F hx breast cancer s/p mastectomy, HTN, hypothryoidism, not on full anticoagulation/antiplatelets per EMS, presenting as a level 2 trauma activation after a fall from a flight of stairs. Patient only able to say "ow" in trauma bay, unable to provide further history. However per son, normal mentation at baseline. X-ray Left shoulder/Hand 4/19: Successfully reduced and anatomically realigned previously dislocated left 5th PIP joint. No associated this with bone fx fragments.

## 2021-04-20 NOTE — PHYSICAL THERAPY INITIAL EVALUATION ADULT - GENERAL OBSERVATIONS, REHAB EVAL
Pt rec'd semi-supine in bed in NAD, VSS, +ICU monitoring, +Cx collar donned, +avila, +O2 via NC (2L)

## 2021-04-20 NOTE — PHYSICAL THERAPY INITIAL EVALUATION ADULT - PRECAUTIONS/LIMITATIONS, REHAB EVAL
Pulse oximeter overlies 3rd finger tip. Generalized osteopenia otherwise no discrete lytic or blastic lesions. X-ray Pelvis 4/19: Slightly comminuted and impacted peripheral upper left iliac wing fracture (Duverney type). Otherwise no evidence for pelvic instability. No sacroiliac or pubic symphysis diastases or malalignment. No dislocations or additional fxs Lower lumbar spine degenerative change apparent. X-ray L clavicle 4/19: Acute displaced obliquely oriented distal left clavicular shaft fracture at coracoid process level however without associated AC joint malalignment. Small thin linear heath of calcific density adjacent to medial coracoid process margin indeterminate for an associated small avulsion fracture fragment. No additional suspected fractures. Preserved AC glenohumeral joint spaces. CT Maxillofacial 4/19: CT head: Left frontal scalp hematoma and laceration. Nondisplaced fracture of the anterior-inferior aspect of the left frontal bone extending into the lateral aspect of the superior rim of the left orbit. Small scattered areas of acute subarachnoid hemorrhage in the bilateral cerebral hemispheres. CT cx c-spine: (-) fx cervical spine. Small left hemopneumothorax. Displaced fractures of the left posterior third and fifth ribs. CT maxillofacial bones: Left periorbital soft tissue hematoma. Nondisplaced fracture of the left nasal bone. Nondisplaced fracture of the lateral aspect of the superior rim of the left orbit. No radiopaque foreign body. Intraorbital contents are intact/fall precautions/spinal precautions

## 2021-04-20 NOTE — OCCUPATIONAL THERAPY INITIAL EVALUATION ADULT - RANGE OF MOTION EXAMINATION, UPPER EXTREMITY
L shoulder NT 2* to clavicle fx/Left UE Active ROM was WFL (within functional limits)/Right UE Active ROM was WFL (within functional limits)

## 2021-04-21 LAB
A1C WITH ESTIMATED AVERAGE GLUCOSE RESULT: 5.7 % — HIGH (ref 4–5.6)
ANION GAP SERPL CALC-SCNC: 7 MMOL/L — SIGNIFICANT CHANGE UP (ref 5–17)
ANION GAP SERPL CALC-SCNC: 7 MMOL/L — SIGNIFICANT CHANGE UP (ref 5–17)
ANION GAP SERPL CALC-SCNC: 8 MMOL/L — SIGNIFICANT CHANGE UP (ref 5–17)
APTT BLD: 25.8 SEC — LOW (ref 27.5–35.5)
APTT BLD: 25.8 SEC — LOW (ref 27.5–35.5)
BASE EXCESS BLDV CALC-SCNC: -1 MMOL/L — SIGNIFICANT CHANGE UP (ref -2–2)
BUN SERPL-MCNC: 12 MG/DL — SIGNIFICANT CHANGE UP (ref 7–23)
BUN SERPL-MCNC: 7 MG/DL — SIGNIFICANT CHANGE UP (ref 7–23)
BUN SERPL-MCNC: 8 MG/DL — SIGNIFICANT CHANGE UP (ref 7–23)
CA-I SERPL-SCNC: 1.12 MMOL/L — SIGNIFICANT CHANGE UP (ref 1.12–1.3)
CALCIUM SERPL-MCNC: 7.1 MG/DL — LOW (ref 8.4–10.5)
CALCIUM SERPL-MCNC: 7.5 MG/DL — LOW (ref 8.4–10.5)
CALCIUM SERPL-MCNC: 7.6 MG/DL — LOW (ref 8.4–10.5)
CHLORIDE BLDV-SCNC: 112 MMOL/L — HIGH (ref 96–108)
CHLORIDE SERPL-SCNC: 104 MMOL/L — SIGNIFICANT CHANGE UP (ref 96–108)
CHLORIDE SERPL-SCNC: 107 MMOL/L — SIGNIFICANT CHANGE UP (ref 96–108)
CHLORIDE SERPL-SCNC: 111 MMOL/L — HIGH (ref 96–108)
CO2 BLDV-SCNC: 24 MMOL/L — SIGNIFICANT CHANGE UP (ref 22–30)
CO2 SERPL-SCNC: 19 MMOL/L — LOW (ref 22–31)
CO2 SERPL-SCNC: 19 MMOL/L — LOW (ref 22–31)
CO2 SERPL-SCNC: 21 MMOL/L — LOW (ref 22–31)
CREAT SERPL-MCNC: 0.31 MG/DL — LOW (ref 0.5–1.3)
CREAT SERPL-MCNC: 0.34 MG/DL — LOW (ref 0.5–1.3)
CREAT SERPL-MCNC: 0.48 MG/DL — LOW (ref 0.5–1.3)
ESTIMATED AVERAGE GLUCOSE: 117 MG/DL — HIGH (ref 68–114)
GAS PNL BLDV: 135 MMOL/L — SIGNIFICANT CHANGE UP (ref 135–145)
GAS PNL BLDV: SIGNIFICANT CHANGE UP
GLUCOSE BLDV-MCNC: 138 MG/DL — HIGH (ref 70–99)
GLUCOSE SERPL-MCNC: 134 MG/DL — HIGH (ref 70–99)
GLUCOSE SERPL-MCNC: 141 MG/DL — HIGH (ref 70–99)
GLUCOSE SERPL-MCNC: 147 MG/DL — HIGH (ref 70–99)
HCO3 BLDV-SCNC: 23 MMOL/L — SIGNIFICANT CHANGE UP (ref 21–29)
HCT VFR BLD CALC: 20.9 % — CRITICAL LOW (ref 34.5–45)
HCT VFR BLD CALC: 21.7 % — LOW (ref 34.5–45)
HCT VFR BLD CALC: 24.6 % — LOW (ref 34.5–45)
HCT VFR BLD CALC: 26.9 % — LOW (ref 34.5–45)
HCT VFR BLDA CALC: 24 % — LOW (ref 39–50)
HGB BLD CALC-MCNC: 7.6 G/DL — LOW (ref 11.5–15.5)
HGB BLD-MCNC: 7 G/DL — CRITICAL LOW (ref 11.5–15.5)
HGB BLD-MCNC: 7.2 G/DL — LOW (ref 11.5–15.5)
HGB BLD-MCNC: 8.4 G/DL — LOW (ref 11.5–15.5)
HGB BLD-MCNC: 9.1 G/DL — LOW (ref 11.5–15.5)
HOROWITZ INDEX BLDV+IHG-RTO: 28 — SIGNIFICANT CHANGE UP
INR BLD: 1.04 RATIO — SIGNIFICANT CHANGE UP (ref 0.88–1.16)
INR BLD: 1.1 RATIO — SIGNIFICANT CHANGE UP (ref 0.88–1.16)
LACTATE BLDV-MCNC: 1.9 MMOL/L — SIGNIFICANT CHANGE UP (ref 0.7–2)
MAGNESIUM SERPL-MCNC: 2 MG/DL — SIGNIFICANT CHANGE UP (ref 1.6–2.6)
MAGNESIUM SERPL-MCNC: 2.1 MG/DL — SIGNIFICANT CHANGE UP (ref 1.6–2.6)
MAGNESIUM SERPL-MCNC: 2.2 MG/DL — SIGNIFICANT CHANGE UP (ref 1.6–2.6)
MCHC RBC-ENTMCNC: 29.1 PG — SIGNIFICANT CHANGE UP (ref 27–34)
MCHC RBC-ENTMCNC: 29.3 PG — SIGNIFICANT CHANGE UP (ref 27–34)
MCHC RBC-ENTMCNC: 30.2 PG — SIGNIFICANT CHANGE UP (ref 27–34)
MCHC RBC-ENTMCNC: 30.4 PG — SIGNIFICANT CHANGE UP (ref 27–34)
MCHC RBC-ENTMCNC: 33.2 GM/DL — SIGNIFICANT CHANGE UP (ref 32–36)
MCHC RBC-ENTMCNC: 33.5 GM/DL — SIGNIFICANT CHANGE UP (ref 32–36)
MCHC RBC-ENTMCNC: 33.8 GM/DL — SIGNIFICANT CHANGE UP (ref 32–36)
MCHC RBC-ENTMCNC: 34.1 GM/DL — SIGNIFICANT CHANGE UP (ref 32–36)
MCV RBC AUTO: 87.4 FL — SIGNIFICANT CHANGE UP (ref 80–100)
MCV RBC AUTO: 87.9 FL — SIGNIFICANT CHANGE UP (ref 80–100)
MCV RBC AUTO: 88.5 FL — SIGNIFICANT CHANGE UP (ref 80–100)
MCV RBC AUTO: 90 FL — SIGNIFICANT CHANGE UP (ref 80–100)
NRBC # BLD: 0 /100 WBCS — SIGNIFICANT CHANGE UP (ref 0–0)
OTHER CELLS CSF MANUAL: 10 ML/DL — LOW (ref 18–22)
PCO2 BLDV: 36 MMHG — LOW (ref 39–42)
PH BLDV: 7.42 — SIGNIFICANT CHANGE UP (ref 7.35–7.45)
PHOSPHATE SERPL-MCNC: 1.7 MG/DL — LOW (ref 2.5–4.5)
PHOSPHATE SERPL-MCNC: 1.9 MG/DL — LOW (ref 2.5–4.5)
PHOSPHATE SERPL-MCNC: 3.6 MG/DL — SIGNIFICANT CHANGE UP (ref 2.5–4.5)
PLATELET # BLD AUTO: 70 K/UL — LOW (ref 150–400)
PLATELET # BLD AUTO: 77 K/UL — LOW (ref 150–400)
PLATELET # BLD AUTO: 91 K/UL — LOW (ref 150–400)
PLATELET # BLD AUTO: 92 K/UL — LOW (ref 150–400)
PO2 BLDV: 55 MMHG — HIGH (ref 25–45)
POTASSIUM BLDV-SCNC: 3.4 MMOL/L — LOW (ref 3.5–5.3)
POTASSIUM SERPL-MCNC: 3.3 MMOL/L — LOW (ref 3.5–5.3)
POTASSIUM SERPL-MCNC: 3.7 MMOL/L — SIGNIFICANT CHANGE UP (ref 3.5–5.3)
POTASSIUM SERPL-MCNC: 4.2 MMOL/L — SIGNIFICANT CHANGE UP (ref 3.5–5.3)
POTASSIUM SERPL-SCNC: 3.3 MMOL/L — LOW (ref 3.5–5.3)
POTASSIUM SERPL-SCNC: 3.7 MMOL/L — SIGNIFICANT CHANGE UP (ref 3.5–5.3)
POTASSIUM SERPL-SCNC: 4.2 MMOL/L — SIGNIFICANT CHANGE UP (ref 3.5–5.3)
PROTHROM AB SERPL-ACNC: 12.5 SEC — SIGNIFICANT CHANGE UP (ref 10.6–13.6)
PROTHROM AB SERPL-ACNC: 13.1 SEC — SIGNIFICANT CHANGE UP (ref 10.6–13.6)
RBC # BLD: 2.39 M/UL — LOW (ref 3.8–5.2)
RBC # BLD: 2.47 M/UL — LOW (ref 3.8–5.2)
RBC # BLD: 2.78 M/UL — LOW (ref 3.8–5.2)
RBC # BLD: 2.99 M/UL — LOW (ref 3.8–5.2)
RBC # FLD: 13.5 % — SIGNIFICANT CHANGE UP (ref 10.3–14.5)
RBC # FLD: 13.6 % — SIGNIFICANT CHANGE UP (ref 10.3–14.5)
RBC # FLD: 13.6 % — SIGNIFICANT CHANGE UP (ref 10.3–14.5)
RBC # FLD: 14.2 % — SIGNIFICANT CHANGE UP (ref 10.3–14.5)
SAO2 % BLDV: 91 % — HIGH (ref 67–88)
SODIUM SERPL-SCNC: 133 MMOL/L — LOW (ref 135–145)
SODIUM SERPL-SCNC: 133 MMOL/L — LOW (ref 135–145)
SODIUM SERPL-SCNC: 137 MMOL/L — SIGNIFICANT CHANGE UP (ref 135–145)
WBC # BLD: 10.1 K/UL — SIGNIFICANT CHANGE UP (ref 3.8–10.5)
WBC # BLD: 10.12 K/UL — SIGNIFICANT CHANGE UP (ref 3.8–10.5)
WBC # BLD: 9.33 K/UL — SIGNIFICANT CHANGE UP (ref 3.8–10.5)
WBC # BLD: 9.97 K/UL — SIGNIFICANT CHANGE UP (ref 3.8–10.5)
WBC # FLD AUTO: 10.1 K/UL — SIGNIFICANT CHANGE UP (ref 3.8–10.5)
WBC # FLD AUTO: 10.12 K/UL — SIGNIFICANT CHANGE UP (ref 3.8–10.5)
WBC # FLD AUTO: 9.33 K/UL — SIGNIFICANT CHANGE UP (ref 3.8–10.5)
WBC # FLD AUTO: 9.97 K/UL — SIGNIFICANT CHANGE UP (ref 3.8–10.5)

## 2021-04-21 PROCEDURE — 99233 SBSQ HOSP IP/OBS HIGH 50: CPT

## 2021-04-21 PROCEDURE — 71045 X-RAY EXAM CHEST 1 VIEW: CPT | Mod: 26

## 2021-04-21 PROCEDURE — 99222 1ST HOSP IP/OBS MODERATE 55: CPT

## 2021-04-21 PROCEDURE — 76775 US EXAM ABDO BACK WALL LIM: CPT | Mod: 26

## 2021-04-21 PROCEDURE — 93010 ELECTROCARDIOGRAM REPORT: CPT

## 2021-04-21 RX ORDER — METOPROLOL TARTRATE 50 MG
25 TABLET ORAL
Refills: 0 | Status: DISCONTINUED | OUTPATIENT
Start: 2021-04-21 | End: 2021-04-21

## 2021-04-21 RX ORDER — ACETAMINOPHEN 500 MG
1000 TABLET ORAL EVERY 6 HOURS
Refills: 0 | Status: COMPLETED | OUTPATIENT
Start: 2021-04-21 | End: 2021-04-22

## 2021-04-21 RX ORDER — POTASSIUM CHLORIDE 20 MEQ
10 PACKET (EA) ORAL
Refills: 0 | Status: DISCONTINUED | OUTPATIENT
Start: 2021-04-21 | End: 2021-04-22

## 2021-04-21 RX ORDER — METOPROLOL TARTRATE 50 MG
5 TABLET ORAL ONCE
Refills: 0 | Status: COMPLETED | OUTPATIENT
Start: 2021-04-21 | End: 2021-04-21

## 2021-04-21 RX ORDER — DEXTROSE MONOHYDRATE, SODIUM CHLORIDE, AND POTASSIUM CHLORIDE 50; .745; 4.5 G/1000ML; G/1000ML; G/1000ML
1000 INJECTION, SOLUTION INTRAVENOUS
Refills: 0 | Status: DISCONTINUED | OUTPATIENT
Start: 2021-04-21 | End: 2021-04-22

## 2021-04-21 RX ORDER — OXYCODONE HYDROCHLORIDE 5 MG/1
5 TABLET ORAL EVERY 4 HOURS
Refills: 0 | Status: DISCONTINUED | OUTPATIENT
Start: 2021-04-21 | End: 2021-04-27

## 2021-04-21 RX ORDER — METOPROLOL TARTRATE 50 MG
25 TABLET ORAL EVERY 12 HOURS
Refills: 0 | Status: DISCONTINUED | OUTPATIENT
Start: 2021-04-21 | End: 2021-04-22

## 2021-04-21 RX ORDER — POTASSIUM PHOSPHATE, MONOBASIC POTASSIUM PHOSPHATE, DIBASIC 236; 224 MG/ML; MG/ML
30 INJECTION, SOLUTION INTRAVENOUS ONCE
Refills: 0 | Status: COMPLETED | OUTPATIENT
Start: 2021-04-21 | End: 2021-04-21

## 2021-04-21 RX ORDER — MAGNESIUM SULFATE 500 MG/ML
2 VIAL (ML) INJECTION ONCE
Refills: 0 | Status: COMPLETED | OUTPATIENT
Start: 2021-04-21 | End: 2021-04-21

## 2021-04-21 RX ORDER — METOPROLOL TARTRATE 50 MG
25 TABLET ORAL ONCE
Refills: 0 | Status: COMPLETED | OUTPATIENT
Start: 2021-04-21 | End: 2021-04-21

## 2021-04-21 RX ORDER — ADENOSINE 3 MG/ML
6 INJECTION INTRAVENOUS ONCE
Refills: 0 | Status: COMPLETED | OUTPATIENT
Start: 2021-04-21 | End: 2021-04-21

## 2021-04-21 RX ADMIN — LEVETIRACETAM 400 MILLIGRAM(S): 250 TABLET, FILM COATED ORAL at 05:48

## 2021-04-21 RX ADMIN — Medication 250 MILLIMOLE(S): at 19:25

## 2021-04-21 RX ADMIN — LIDOCAINE 1 PATCH: 4 CREAM TOPICAL at 11:32

## 2021-04-21 RX ADMIN — Medication 250 MILLIMOLE(S): at 16:37

## 2021-04-21 RX ADMIN — ESCITALOPRAM OXALATE 10 MILLIGRAM(S): 10 TABLET, FILM COATED ORAL at 11:32

## 2021-04-21 RX ADMIN — Medication 400 MILLIGRAM(S): at 20:01

## 2021-04-21 RX ADMIN — LIDOCAINE 1 PATCH: 4 CREAM TOPICAL at 20:15

## 2021-04-21 RX ADMIN — POTASSIUM PHOSPHATE, MONOBASIC POTASSIUM PHOSPHATE, DIBASIC 83.33 MILLIMOLE(S): 236; 224 INJECTION, SOLUTION INTRAVENOUS at 05:48

## 2021-04-21 RX ADMIN — DEXTROSE MONOHYDRATE, SODIUM CHLORIDE, AND POTASSIUM CHLORIDE 50 MILLILITER(S): 50; .745; 4.5 INJECTION, SOLUTION INTRAVENOUS at 19:25

## 2021-04-21 RX ADMIN — Medication 400 MILLIGRAM(S): at 11:32

## 2021-04-21 RX ADMIN — Medication 250 MILLIMOLE(S): at 18:09

## 2021-04-21 RX ADMIN — OXYCODONE HYDROCHLORIDE 5 MILLIGRAM(S): 5 TABLET ORAL at 23:29

## 2021-04-21 RX ADMIN — Medication 1000 MILLIGRAM(S): at 11:47

## 2021-04-21 RX ADMIN — LIDOCAINE 1 PATCH: 4 CREAM TOPICAL at 22:59

## 2021-04-21 RX ADMIN — Medication 400 MILLIGRAM(S): at 05:08

## 2021-04-21 RX ADMIN — Medication 250 MILLIMOLE(S): at 03:31

## 2021-04-21 RX ADMIN — Medication 100 MILLIEQUIVALENT(S): at 22:54

## 2021-04-21 RX ADMIN — Medication 25 MILLIGRAM(S): at 11:33

## 2021-04-21 RX ADMIN — Medication 50 GRAM(S): at 10:19

## 2021-04-21 RX ADMIN — CHLORHEXIDINE GLUCONATE 1 APPLICATION(S): 213 SOLUTION TOPICAL at 05:09

## 2021-04-21 RX ADMIN — Medication 250 MILLIMOLE(S): at 04:36

## 2021-04-21 RX ADMIN — Medication 100 MILLIEQUIVALENT(S): at 23:29

## 2021-04-21 RX ADMIN — Medication 400 MILLIGRAM(S): at 17:05

## 2021-04-21 RX ADMIN — Medication 1000 MILLIGRAM(S): at 20:30

## 2021-04-21 RX ADMIN — Medication 1000 MILLIGRAM(S): at 17:20

## 2021-04-21 RX ADMIN — DEXTROSE MONOHYDRATE, SODIUM CHLORIDE, AND POTASSIUM CHLORIDE 50 MILLILITER(S): 50; .745; 4.5 INJECTION, SOLUTION INTRAVENOUS at 11:31

## 2021-04-21 RX ADMIN — ADENOSINE 6 MILLIGRAM(S): 3 INJECTION INTRAVENOUS at 10:18

## 2021-04-21 RX ADMIN — LEVETIRACETAM 400 MILLIGRAM(S): 250 TABLET, FILM COATED ORAL at 17:06

## 2021-04-21 RX ADMIN — Medication 84 MICROGRAM(S): at 21:06

## 2021-04-21 RX ADMIN — Medication 5 MILLIGRAM(S): at 10:19

## 2021-04-21 RX ADMIN — Medication 25 MILLIGRAM(S): at 17:05

## 2021-04-21 RX ADMIN — Medication 250 MILLIMOLE(S): at 02:26

## 2021-04-21 NOTE — CONSULT NOTE ADULT - SUBJECTIVE AND OBJECTIVE BOX
Patient is a 65y old  Female who presents with a chief complaint of Fall from flight of stairs (2021 05:33)    Admission HPI:  65F hx breast cancer s/p mastectomy, HTN, hypothryoidism, not on full anticoagulation/antiplatelets per EMS, presenting as a level 2 trauma activation after a fall from a flight of stairs. Patient only able to say "ow" in trauma bay, unable to provide further history. However per son, normal mentation at baseline.     In the trauma bay, airway was intact with bilateral breath sounds, O2 saturation 100% on room air. Initially blood pressure was 140s systolic however on repeat 90s. NS@100 initiated. Secondary significant for left forehead laceration, left chest wall tenderness, left pelvic tenderness.  (2021 11:16)    Interval History:  Patient had imaging as follows     Xray Hand 2 Views, Left (21 @ 14:00) >  Ulnarly dislocated 5th PIP joint however without accompanying displaced bone fracture fragments.  Intact and normally aligned remaining imaged osteoarticular structures with preserved remaining joint spaces and no joint margin erosions.  Carpal bones normally aligned.  Neutral ulnar variance.  Generalized mild osteopenia otherwise no discrete lytic or blastic lesions.      Xray Clavicle, Left (21 @ 15:41) >  Acute displaced obliquely oriented distal left clavicular shaft fracture at coracoid process level however without associated AC joint malalignment.  Small thin linear heath of calcific density adjacent to medial coracoid process margin indeterminate for an associated small avulsion fracture fragment. No additional suspected fractures.  No dislocations or additional fractures.  Preserved AC glenohumeral joint spaces.  Generalized osteopenia otherwise no discrete lytic or blastic lesions.    CT Head No Cont (21 @ 11:22) >  CT head: Left frontal scalp hematoma and laceration. Nondisplaced fracture of the anterior-inferior aspect of the left frontal bone extending into the lateral aspect of the superior rim of the left orbit. No radiopaque foreign body. Small scattered areas of acute subarachnoid hemorrhage in the bilateral cerebral hemispheres.    CT cervical spine: No acute fracture or traumatic subluxation of the cervical spine. Small left hemopneumothorax. Displaced fractures of the left posterior third and fifth ribs.    CT maxillofacial bones: Left periorbital soft tissue hematoma. Nondisplaced fracture of the left nasal bone. Nondisplaced fracture of the lateral aspect of the superior rim of the left orbit. No radiopaque foreign body. Intraorbital contents are intact.      patient evaluated by ortho- no surgical intervention- NWB LUE, WBAT LLE.    REVIEW OF SYSTEMS: No chest pain, shortness of breath, nausea, vomiting or diarhea; other ROS neg     PAST MEDICAL & SURGICAL HISTORY  Hypothyroidism    Hyperthyroidism    Breast cancer    Salivary Gland Disease    C Section    Abnormality, eye    S/P D&amp;C (status post dilation and curettage)    H/O left mastectomy    FUNCTIONAL HISTORY:   Lives w spouse  Independent w amb w RW, has HHA that assists with some ADLs    CURRENT FUNCTIONAL STATUS:  Max A transfers and gait    FAMILY HISTORY   N/C    MEDICATIONS   acetaminophen  IVPB .. 1000 milliGRAM(s) IV Intermittent every 6 hours  chlorhexidine 2% Cloths 1 Application(s) Topical <User Schedule>  escitalopram 10 milliGRAM(s) Oral daily  levETIRAcetam  IVPB 500 milliGRAM(s) IV Intermittent every 12 hours  levothyroxine Injectable 84 MICROGram(s) IV Push at bedtime  lidocaine   Patch 1 Patch Transdermal daily  sodium chloride 0.9%. 1000 milliLiter(s) IV Continuous <Continuous>      ALLERGIES  Tapazole (Hives)      VITALS  T(C): 37.2 (21 @ 07:00), Max: 37.6 (21 @ 19:00)  HR: 92 (21 @ 09:00) (62 - 107)  BP: 123/70 (21 @ 09:00) (100/57 - 141/71)  RR: 25 (21 @ 09:00) (12 - 28)  SpO2: 96% (21 @ 09:00) (87% - 96%)  Wt(kg): --    PHYSICAL EXAM  Constitutional - NAD, Comfortable  HEENT - NCAT, EOMI  Neck - Supple, No limited ROM  Chest - CTA bilaterally, No wheeze, No rhonchi, No crackles  Cardiovascular - RRR, S1S2, No murmurs  Abdomen - BS+, Soft, NTND  Extremities - No C/C/E, No calf tenderness   Neurologic Exam -                    Cognitive - Awake, Alert, AAO to self, place, date, year, situation     Communication - Fluent, No dysarthria, no aphasia     Cranial Nerves - CN 2-12 intact     Motor - No focal deficits      Sensory - Intact to LT     Reflexes - DTR Intact, No primitive reflexive  Psychiatric - Mood stable, Affect WNL    RECENT LABS/IMAGING  CBC Full  -  ( 2021 06:10 )  WBC Count : 9.33 K/uL  RBC Count : 2.39 M/uL  Hemoglobin : 7.0 g/dL  Hematocrit : 20.9 %  Platelet Count - Automated : 77 K/uL  Mean Cell Volume : 87.4 fl  Mean Cell Hemoglobin : 29.3 pg  Mean Cell Hemoglobin Concentration : 33.5 gm/dL  Auto Neutrophil # : x  Auto Lymphocyte # : x  Auto Monocyte # : x  Auto Eosinophil # : x  Auto Basophil # : x  Auto Neutrophil % : x  Auto Lymphocyte % : x  Auto Monocyte % : x  Auto Eosinophil % : x  Auto Basophil % : x        137  |  111<H>  |  12  ----------------------------<  141<H>  3.7   |  19<L>  |  0.48<L>    Ca    7.6<L>      2021 00:36  Phos  1.7       Mg     2.0         TPro  6.7  /  Alb  4.1  /  TBili  0.5  /  DBili  x   /  AST  39  /  ALT  37  /  AlkPhos  92      Urinalysis Basic - ( 2021 11:42 )    Color: Colorless / Appearance: Clear / S.039 / pH: x  Gluc: x / Ketone: Small  / Bili: Negative / Urobili: Negative   Blood: x / Protein: Trace / Nitrite: Negative   Leuk Esterase: Negative / RBC: 4 /hpf / WBC 1 /HPF   Sq Epi: x / Non Sq Epi: 1 /hpf / Bacteria: Negative    Impression:  64 yo with functional deficits secondary to diagnosis of polytauma- s/p fall w SAH, L rib fxs, pelvis fx, clavicle fx    Plan:  PT- ROM, Bed Mob, Transfers, Amb w AD and bracing as needed  OT- ADLs, bracing  SLP- Dysphagia eval and treat  Monitor anemia and thrombocytopenia  Prec- Falls, Cardiac, NWB LUE  DVT Prophylaxis- SCDs  Skin- Turn q2 h  Dispo-  Patient is a 65y old  Female who presents with a chief complaint of Fall from flight of stairs (2021 05:33)    Admission HPI:  65F hx breast cancer s/p mastectomy, HTN, hypothryoidism, not on full anticoagulation/antiplatelets per EMS, presenting as a level 2 trauma activation after a fall from a flight of stairs. Patient only able to say "ow" in trauma bay, unable to provide further history. However per son, normal mentation at baseline.     In the trauma bay, airway was intact with bilateral breath sounds, O2 saturation 100% on room air. Initially blood pressure was 140s systolic however on repeat 90s. NS@100 initiated. Secondary significant for left forehead laceration, left chest wall tenderness, left pelvic tenderness.  (2021 11:16)    Interval History:  Patient had imaging as follows     Xray Hand 2 Views, Left (21 @ 14:00) >  Ulnarly dislocated 5th PIP joint however without accompanying displaced bone fracture fragments.  Intact and normally aligned remaining imaged osteoarticular structures with preserved remaining joint spaces and no joint margin erosions.  Carpal bones normally aligned.  Neutral ulnar variance.  Generalized mild osteopenia otherwise no discrete lytic or blastic lesions.      Xray Clavicle, Left (21 @ 15:41) >  Acute displaced obliquely oriented distal left clavicular shaft fracture at coracoid process level however without associated AC joint malalignment.  Small thin linear heath of calcific density adjacent to medial coracoid process margin indeterminate for an associated small avulsion fracture fragment. No additional suspected fractures.  No dislocations or additional fractures.  Preserved AC glenohumeral joint spaces.  Generalized osteopenia otherwise no discrete lytic or blastic lesions.    CT Head No Cont (21 @ 11:22) >  CT head: Left frontal scalp hematoma and laceration. Nondisplaced fracture of the anterior-inferior aspect of the left frontal bone extending into the lateral aspect of the superior rim of the left orbit. No radiopaque foreign body. Small scattered areas of acute subarachnoid hemorrhage in the bilateral cerebral hemispheres.    CT cervical spine: No acute fracture or traumatic subluxation of the cervical spine. Small left hemopneumothorax. Displaced fractures of the left posterior third and fifth ribs.    CT maxillofacial bones: Left periorbital soft tissue hematoma. Nondisplaced fracture of the left nasal bone. Nondisplaced fracture of the lateral aspect of the superior rim of the left orbit. No radiopaque foreign body. Intraorbital contents are intact.      patient evaluated by ortho- no surgical intervention- NWB CARTERE, WBAT LLE.    REVIEW OF SYSTEMS: Patient poor historian- when asked if she has pain or other ROS she answers "OK"    PAST MEDICAL & SURGICAL HISTORY  Hypothyroidism    Hyperthyroidism    Breast cancer    Salivary Gland Disease    C Section    Abnormality, eye    S/P D&amp;C (status post dilation and curettage)    H/O left mastectomy    FUNCTIONAL HISTORY:   Lives w spouse  Independent w amb w RW, has HHA that assists with some ADLs    CURRENT FUNCTIONAL STATUS:  Max A transfers and gait    FAMILY HISTORY   N/C    MEDICATIONS   acetaminophen  IVPB .. 1000 milliGRAM(s) IV Intermittent every 6 hours  chlorhexidine 2% Cloths 1 Application(s) Topical <User Schedule>  escitalopram 10 milliGRAM(s) Oral daily  levETIRAcetam  IVPB 500 milliGRAM(s) IV Intermittent every 12 hours  levothyroxine Injectable 84 MICROGram(s) IV Push at bedtime  lidocaine   Patch 1 Patch Transdermal daily  sodium chloride 0.9%. 1000 milliLiter(s) IV Continuous <Continuous>      ALLERGIES  Tapazole (Hives)      VITALS  T(C): 37.2 (21 @ 07:00), Max: 37.6 (21 @ 19:00)  HR: 92 (21 @ 09:00) (62 - 107)  BP: 123/70 (21 @ 09:00) (100/57 - 141/71)  RR: 25 (21 @ 09:00) (12 - 28)  SpO2: 96% (21 @ 09:00) (87% - 96%)  Wt(kg): --    PHYSICAL EXAM  Constitutional - NAD, Comfortable  HEENT - +eccymoses, + incision L forehead intact  Neck - Cervical collar in place  Chest - CTA bilaterally, No wheeze, No rhonchi, No crackles  Cardiovascular - RRR, S1S2, No murmurs  Abdomen - BS+, Soft, NTND  Extremities - No C/C/E, No calf tenderness   Neurologic Exam -                 Alert  Follows verbal instruction  Slow processing  Poor insight, easily distractable  AAO x 2  Motor non-focal  Can name President, memory 0/3     Psychiatric - Mood stable, Affect WNL    RECENT LABS/IMAGING  CBC Full  -  ( 2021 06:10 )  WBC Count : 9.33 K/uL  RBC Count : 2.39 M/uL  Hemoglobin : 7.0 g/dL  Hematocrit : 20.9 %  Platelet Count - Automated : 77 K/uL  Mean Cell Volume : 87.4 fl  Mean Cell Hemoglobin : 29.3 pg  Mean Cell Hemoglobin Concentration : 33.5 gm/dL  Auto Neutrophil # : x  Auto Lymphocyte # : x  Auto Monocyte # : x  Auto Eosinophil # : x  Auto Basophil # : x  Auto Neutrophil % : x  Auto Lymphocyte % : x  Auto Monocyte % : x  Auto Eosinophil % : x  Auto Basophil % : x    -    137  |  111<H>  |  12  ----------------------------<  141<H>  3.7   |  19<L>  |  0.48<L>    Ca    7.6<L>      2021 00:36  Phos  1.7       Mg     2.0         TPro  6.7  /  Alb  4.1  /  TBili  0.5  /  DBili  x   /  AST  39  /  ALT  37  /  AlkPhos  92  -    Urinalysis Basic - ( 2021 11:42 )    Color: Colorless / Appearance: Clear / S.039 / pH: x  Gluc: x / Ketone: Small  / Bili: Negative / Urobili: Negative   Blood: x / Protein: Trace / Nitrite: Negative   Leuk Esterase: Negative / RBC: 4 /hpf / WBC 1 /HPF   Sq Epi: x / Non Sq Epi: 1 /hpf / Bacteria: Negative    Impression:  66 yo with functional deficits secondary to diagnosis of polytauma- s/p fall w SAH and TBI, L rib fxs, pelvis fx, clavicle fx    Plan:  PT- ROM, Bed Mob, Transfers, Amb w AD and bracing as needed  OT- ADLs, bracing  SLP- Dysphagia eval and treat  Monitor anemia and thrombocytopenia  Prec- Falls, Cardiac, NWB LUE  DVT Prophylaxis- SCDs  Skin- Turn q2 h  Dispo- Acute TBI Rehab- can tolerate 3h/d of therapies and requires daily physician visits

## 2021-04-21 NOTE — DIETITIAN INITIAL EVALUATION ADULT. - REASON INDICATOR FOR ASSESSMENT
Nutrition Assessment warranted for length of stay on SICU  Information obtained from: medical record, communication with team. Pt noted with early dementia, A&O 1-2

## 2021-04-21 NOTE — PROGRESS NOTE ADULT - ASSESSMENT
65F hx breast cancer s/p mastectomy, HTN, hypothryoidism, not on full anticoagulation/antiplatelets per EMS, presenting as a level 2 trauma activation after a fall from a flight of stairs found to have (1) SAH, (2) left-sided rib fractures, (3) left iliac wing fracture with hematoma, (4) left forehead laceration s/p successful closed reduction of left pinky PIP dislocation     Plan:   - Neuro checks q 4hr  - Keppra 500 q 12hrs x 7 days  - Pain control with standing Tylenol, Lidoderm patch and Dilaudid prn   - Incidental R renal lesion to be discussed  - Will require C-collar clearance (currently with distracting injury)  - Forehead laceration repair at bedside  - Appreciate plastics eval  - Tertiary exam  - Will obtain home medications from son  - Hold chemical VTE ppx for now, SCDs  - Repeat CTH this am   - PT evaluation after full evaluation    [phone]

## 2021-04-21 NOTE — DIETITIAN INITIAL EVALUATION ADULT. - ADD RECOMMEND
1) Continue Regular diet with full feeding assistance. 2) [place] Consistency Mighty Shake supplement (200 calories, 6 gm protein) added to meal trays.

## 2021-04-21 NOTE — PROGRESS NOTE ADULT - SUBJECTIVE AND OBJECTIVE BOX
HISTORY  65y Female with h/o HTN, breast CA s/p L mastectomy, hypothyroidism admitted as level II trauma s/p fall down stairs.  As per son, patient is able to care for herself at baseline.  Patient only able to say "ow" in the trauma bay.  Initial BP 140s, but decreased to 90s on repeat.  Started on IVF.  Secondary survey positive for L scalp laceration, L chest wall tenderness, and L pelvic tenderness.  Patient more conversive upon arrival to SICU, GCS 13 (E3, V4, M6).  Patient became hypotensive to SBP 90s and received additional 1L bolus.  Imaging revealed the following injuries: L nasal bone fracture; L lateral orbit wall fracture; small scattered B/L SAHs; minimally displaced L clavicular fracture; minimally displaced sternal fracture; L 3-8 rib fractures; small L pneumothorax; R 7th rib fracture; likely chronic 6th and 8th rib fracture; acute displaced, comminuted fracture of the L iliac wing, and a R renal lesion.  Patient also found to have large hematoma near the iliac wing fracture.      24 HOUR EVENTS:  - CTH stable  - Added home lexapro  - Added NS @ 50cc/hr to supplement poor PO intake  - Lactate cleared (1.9)      NEURO  Exam: AOx1 (person only), following commands. C collar in place  Meds: acetaminophen  IVPB .. 1000 milliGRAM(s) IV Intermittent every 6 hours  escitalopram 10 milliGRAM(s) Oral daily  levETIRAcetam  IVPB 500 milliGRAM(s) IV Intermittent every 12 hours  [x] Adequacy of sedation and pain control has been assessed and adjusted      RESPIRATORY  RR: 17 (04-21-21 @ 05:00) (12 - 28)  SpO2: 95% (04-21-21 @ 05:00) (90% - 97%)  Exam: unlabored respirations  ABG - ( 19 Apr 2021 15:26 )  pH: 7.29  /  pCO2: 42    /  pO2: 38    / HCO3: 20    / Base Excess: -6.1  /  SaO2: 63      Meds:       CARDIOVASCULAR  HR: 84 (04-21-21 @ 05:00) (62 - 107)  BP: 133/67 (04-21-21 @ 05:00) (100/57 - 141/71)  BP(mean): 94 (04-21-21 @ 05:00) (73 - 99)  VBG - ( 21 Apr 2021 00:29 )  pH: 7.42  /  pCO2: 36    /  pO2: 55    / HCO3: 23    / Base Excess: -1.0  /  SaO2: 91     Lactate: 1.9    Exam: RRR  Cardiac Rhythm: normal sinus  Perfusion     [x]Adequate   [ ]Inadequate  Mentation   [ ]Normal       [x]Reduced  Extremities  [x]Warm         [ ]Cool  Volume Status [ ]Hypervolemic [x]Euvolemic [ ]Hypovolemic  Meds:       GI/NUTRITION  Exam: soft, nontender, nondistended  Diet: regular  Meds:       GENITOURINARY  I&O's Detail    04-19 @ 07:01 - 04-20 @ 07:00  --------------------------------------------------------  IN:    IV PiggyBack: 1250 mL    IV PiggyBack: 200 mL    sodium chloride 0.9%: 2000 mL    Sodium Chloride 0.9% Bolus: 3000 mL  Total IN: 6450 mL    OUT:    Indwelling Catheter - Urethral (mL): 2030 mL  Total OUT: 2030 mL    Total NET: 4420 mL    04-20 @ 07:01 - 04-21 @ 05:33  --------------------------------------------------------  IN:    IV PiggyBack: 100 mL    IV PiggyBack: 1250 mL    Oral Fluid: 450 mL    sodium chloride 0.9%: 600 mL    sodium chloride 0.9%: 400 mL  Total IN: 2800 mL    OUT:    Indwelling Catheter - Urethral (mL): 1130 mL  Total OUT: 1130 mL    Total NET: 1670 mL    04-21    137  |  111<H>  |  12  ----------------------------<  141<H>  3.7   |  19<L>  |  0.48<L>    Ca    7.6<L>      21 Apr 2021 00:36  Phos  1.7     04-21  Mg     2.0     04-21    TPro  6.7  /  Alb  4.1  /  TBili  0.5  /  DBili  x   /  AST  39  /  ALT  37  /  AlkPhos  92  04-19    [x] Spangler catheter, indication: UOP  Meds: potassium phosphate IVPB 30 milliMole(s) IV Intermittent once  sodium chloride 0.9%. 1000 milliLiter(s) IV Continuous <Continuous>      HEMATOLOGIC  Meds:   [ ] VTE Prophylaxis held iso head bleed                        7.2    9.97  )-----------( 92       ( 21 Apr 2021 00:36 )             21.7     PT/INR - ( 21 Apr 2021 00:36 )   PT: 13.1 sec;   INR: 1.10 ratio    PTT - ( 21 Apr 2021 00:36 )  PTT:25.8 sec  Transfusion     [ ] PRBC   [ ] Platelets   [ ] FFP   [ ] Cryoprecipitate      INFECTIOUS DISEASES  T(C): 37.4 (04-21-21 @ 03:00), Max: 37.6 (04-20-21 @ 19:00)  WBC Count: 9.97 K/uL (04-21 @ 00:36)  WBC Count: 10.67 K/uL (04-20 @ 20:26)  WBC Count: 10.35 K/uL (04-20 @ 16:28)  WBC Count: 11.03 K/uL (04-20 @ 07:44)    Recent Cultures:    Meds:       ENDOCRINE  Capillary Blood Glucose    Meds: levothyroxine Injectable 84 MICROGram(s) IV Push at bedtime      ACCESS DEVICES:  [x] Peripheral IV  [ ] Central Venous Line	[ ] R	[ ] L	[ ] IJ	[ ] Fem	[ ] SC	Placed:   [ ] Arterial Line		[ ] R	[ ] L	[ ] Fem	[ ] Rad	[ ] Ax	Placed:   [ ] PICC:					[ ] Mediport  [x] Urinary Catheter, Date Placed: 4/19  [x] Necessity of urinary, arterial, and venous catheters discussed      OTHER MEDICATIONS:  chlorhexidine 2% Cloths 1 Application(s) Topical <User Schedule>  lidocaine   Patch 1 Patch Transdermal daily    CODE STATUS: full code    IMAGING:

## 2021-04-21 NOTE — PROGRESS NOTE ADULT - ASSESSMENT
65y Female with h/o HTN, breast CA s/p L mastectomy, hypothyroidism admitted as level II trauma s/p fall down stairs.  As per son, patient is able to care for herself at baseline.  Patient only able to say "ow" in the trauma bay.  Initial BP 140s, but decreased to 90s on repeat.  Started on IVF.  Secondary survey positive for L scalp laceration, L chest wall tenderness, and L pelvic tenderness.  Patient more conversive upon arrival to SICU, GCS 13 (E3, V4, M6).  Patient became hypotensive to SBP 90s and received additional 1L bolus.  Imaging revealed the following injuries: L nasal bone fracture; L lateral orbit wall fracture; small scattered B/L SAHs; minimally displaced L clavicular fracture; minimally displaced sternal fracture; L 3-8 rib fractures; small L pneumothorax; R 7th rib fracture; likely chronic 6th and 8th rib fracture; acute displaced, comminuted fracture of the L iliac wing, and a R renal lesion.  Patient also found to have large hematoma near the iliac wing fracture.  Patient now s/p hemorrhage watch. Head CT stable.     PLAN  Neuro:  small scattered B/L traumatic SAHs  - Neuro checks q 1hr  - Keppra 500 q 12hrs x 7 days  - Pain control with standing Tylenol, Lidoderm patch  - Cervical collar  - Neurosurgery following  - Home lexapro    Resp: Acute B/L rib fractures, small L pneumothorax  - Encourage incentive spirometry  - f/u am CXR  - Pain control as above     CV: Hypotension, acute sternal fracture  - Hemodynamic monitoring  - Lactate cleared  - EKG for sternal fracture WNL    GI:   - Regular diet, although poor intake    /Renal: Incidental finding of R renal lesion  - Inform patient/HCP of incidental findings  - NS @ 50mL/hr to supplement poor PO intake    ID:  - Monitor clinically for signs of active infection    Heme: Hematoma surrounding iliac wing fracture  - Hemorrhage watch passed  - Holding chemical VTE prophylaxis for head bleed, can restart 24hr post-stable CTH  - SCDs     Endo:   - Monitor serum glucose   - Follow up A1c  - Home synthroid    MSK:  - Ortho following for iliac wing fracture  - Hand consult for possible L 5th finger fracture  - Plastics consult for nasal bone fracture     DISPO: Will remain in SICU  Code Status: Full code

## 2021-04-21 NOTE — PROVIDER CONTACT NOTE (OTHER) - ASSESSMENT
Patients neuro exam unchanged, no other changes noted, vtial signs stable
Pt noted to be impulsive & agitated but asking for the remotie and pointing at SD stockings & observed to be pressing all the buttons on the remote but states she needs no help and just needs the remote.

## 2021-04-21 NOTE — PROVIDER CONTACT NOTE (OTHER) - ACTION/TREATMENT ORDERED:
PA to order second type and screen, and contact family for blood transfusion consent
No interventions at this time. Pt is assessed at the bedside. VS WNL.

## 2021-04-21 NOTE — DIETITIAN INITIAL EVALUATION ADULT. - OTHER INFO
GASTROINTESTINAL:  Last BM: 4/19  Bowel Regimen: none noted    NUTRITION STATUS:  - IVF:  NS with KCL @ 50 ml/hr while po intake is poor    WEIGHT HISTORY:   - Per HIE: 65.8kg (11/8/16), 66.7kg (1/31/19), 68kg (4/21/20)  - Current dosing wt 59.9kg indicates possible 12% wt loss in 1 year (not clinically significant)

## 2021-04-21 NOTE — PROGRESS NOTE ADULT - SUBJECTIVE AND OBJECTIVE BOX
24 HOUR EVENTS:  - CTH stable  - Added home lexapro  - Added NS @ 50cc/hr to supplement poor PO intake  - Lactate cleared (1.9)      PHYSICAL EXAM:  Chest: Left chest wall tenderness.   Abdomen: Soft, non-distended, non-tender, no rebound, no guarding, no masses palpated  Pelvis: Tender on left side, no ecchymosis  Ext: palp radial b/l UE, b/l DP palp in Lower Extrem, motor and sensory grossly intact in all 4 extremities, left 5th digit edema / tenderness  Back: no TTP, no palpable runoff/stepoff/deformity    Vital Signs Last 24 Hrs  T(C): 37.2 (2021 07:00), Max: 37.6 (2021 19:00)  T(F): 99 (2021 07:00), Max: 99.7 (2021 19:00)  HR: 92 (2021 10:16) (62 - 170)  BP: 116/68 (2021 10:16) (100/57 - 141/71)  BP(mean): 86 (2021 10:16) (74 - 102)  RR: 18 (2021 10:16) (12 - 28)  SpO2: 93% (2021 10:16) (87% - 96%)    I&O's Detail    2021 07:01  -  2021 07:00  --------------------------------------------------------  IN:    IV PiggyBack: 200 mL    IV PiggyBack: 1416.6 mL    Oral Fluid: 450 mL    sodium chloride 0.9%: 700 mL    sodium chloride 0.9%: 400 mL  Total IN: 3166.6 mL    OUT:    Indwelling Catheter - Urethral (mL): 1455 mL  Total OUT: 1455 mL    Total NET: 1711.6 mL      2021 07:01  -  2021 10:41  --------------------------------------------------------  IN:    IV PiggyBack: 83.3 mL    sodium chloride 0.9%: 100 mL  Total IN: 183.3 mL    OUT:    Indwelling Catheter - Urethral (mL): 155 mL  Total OUT: 155 mL    Total NET: 28.3 mL              137  |  111<H>  |  12  ----------------------------<  141<H>  3.7   |  19<L>  |  0.48<L>    Ca    7.6<L>      2021 00:36  Phos  1.7     -21  Mg     2.0     -    TPro  6.7  /  Alb  4.1  /  TBili  0.5  /  DBili  x   /  AST  39  /  ALT  37  /  AlkPhos  92  04-19                            7.0    9.33  )-----------( 77       ( 2021 06:10 )             20.9       PT/INR - ( 2021 00:36 )   PT: 13.1 sec;   INR: 1.10 ratio         PTT - ( 2021 00:36 )  PTT:25.8 sec    LABS:                         7.0    9.33  )-----------( 77       ( 2021 06:10 )             20.9     04-21    137  |  111<H>  |  12  ----------------------------<  141<H>  3.7   |  19<L>  |  0.48<L>    Ca    7.6<L>      2021 00:36  Phos  1.7     04-21  Mg     2.0     -    TPro  6.7  /  Alb  4.1  /  TBili  0.5  /  DBili  x   /  AST  39  /  ALT  37  /  AlkPhos  92  04-19    PT/INR - ( 2021 00:36 )   PT: 13.1 sec;   INR: 1.10 ratio         PTT - ( 2021 00:36 )  PTT:25.8 sec  Urinalysis Basic - ( 2021 11:42 )    Color: Colorless / Appearance: Clear / S.039 / pH: x  Gluc: x / Ketone: Small  / Bili: Negative / Urobili: Negative   Blood: x / Protein: Trace / Nitrite: Negative   Leuk Esterase: Negative / RBC: 4 /hpf / WBC 1 /HPF   Sq Epi: x / Non Sq Epi: 1 /hpf / Bacteria: Negative        MEDICATIONS  (STANDING):  acetaminophen  IVPB .. 1000 milliGRAM(s) IV Intermittent every 6 hours  chlorhexidine 2% Cloths 1 Application(s) Topical <User Schedule>  escitalopram 10 milliGRAM(s) Oral daily  levETIRAcetam  IVPB 500 milliGRAM(s) IV Intermittent every 12 hours  levothyroxine Injectable 84 MICROGram(s) IV Push at bedtime  lidocaine   Patch 1 Patch Transdermal daily  metoprolol tartrate 25 milliGRAM(s) Oral every 12 hours  metoprolol tartrate 25 milliGRAM(s) Oral once  sodium chloride 0.9% with potassium chloride 20 mEq/L 1000 milliLiter(s) (50 mL/Hr) IV Continuous <Continuous>    MEDICATIONS  (PRN):

## 2021-04-21 NOTE — PROGRESS NOTE ADULT - SUBJECTIVE AND OBJECTIVE BOX
Patient received 1 unit of PRBC today of hip hematoma. Held restarting DVT ppx. Once stable Hct x 3 will consider starting DVT ppx if no concern for further bleeding which will be likely in the morning.

## 2021-04-21 NOTE — DIETITIAN INITIAL EVALUATION ADULT. - PERTINENT LABORATORY DATA
04-21 @ 06:10:  Hemoglobin 7.0<LL>, Hematocrit 20.9<LL>,   04-21 @ 00:36: Sodium 137, Potassium 3.7, Calcium 7.6<L>, Magnesium 2.0, Phosphorus 1.7<L>, BUN 12, Creatinine 0.48<L>, Glucose 141<H>, Hemoglobin 7.2<L>, Hematocrit 21.7<L>,

## 2021-04-21 NOTE — DIETITIAN INITIAL EVALUATION ADULT. - PERTINENT MEDS FT
MEDICATIONS  (STANDING):  acetaminophen  IVPB .. 1000 milliGRAM(s) IV Intermittent every 6 hours  chlorhexidine 2% Cloths 1 Application(s) Topical <User Schedule>  escitalopram 10 milliGRAM(s) Oral daily  levETIRAcetam  IVPB 500 milliGRAM(s) IV Intermittent every 12 hours  levothyroxine Injectable 84 MICROGram(s) IV Push at bedtime  lidocaine   Patch 1 Patch Transdermal daily  metoprolol tartrate 25 milliGRAM(s) Oral every 12 hours  metoprolol tartrate 25 milliGRAM(s) Oral once  sodium chloride 0.9% with potassium chloride 20 mEq/L 1000 milliLiter(s) (50 mL/Hr) IV Continuous <Continuous>

## 2021-04-21 NOTE — DIETITIAN INITIAL EVALUATION ADULT. - REASON FOR ADMISSION
Fall from flight of stairs    Per chart: "65y Female with h/o HTN, breast CA s/p L mastectomy, hypothyroidism admitted as level II trauma s/p fall down stairs.  As per son, patient is able to care for herself at baseline.  Patient only able to say "ow" in the trauma bay.  Initial BP 140s, but decreased to 90s on repeat.  Started on IVF.  Secondary survey positive for L scalp laceration, L chest wall tenderness, and L pelvic tenderness.  Patient more conversive upon arrival to SICU, GCS 13 (E3, V4, M6).  Patient became hypotensive to SBP 90s and received additional 1L bolus.  Imaging revealed the following injuries: L nasal bone fracture; L lateral orbit wall fracture; small scattered B/L SAHs; minimally displaced L clavicular fracture; minimally displaced sternal fracture; L 3-8 rib fractures; small L pneumothorax; R 7th rib fracture; likely chronic 6th and 8th rib fracture; acute displaced, comminuted fracture of the L iliac wing, and a R renal lesion.  Patient also found to have large hematoma near the iliac wing fracture.  Patient now s/p hemorrhage watch. Head CT stable."

## 2021-04-22 LAB
ALBUMIN SERPL ELPH-MCNC: 3.1 G/DL — LOW (ref 3.3–5)
ALP SERPL-CCNC: 63 U/L — SIGNIFICANT CHANGE UP (ref 40–120)
ALT FLD-CCNC: 31 U/L — SIGNIFICANT CHANGE UP (ref 10–45)
ANION GAP SERPL CALC-SCNC: 12 MMOL/L — SIGNIFICANT CHANGE UP (ref 5–17)
ANION GAP SERPL CALC-SCNC: 7 MMOL/L — SIGNIFICANT CHANGE UP (ref 5–17)
APPEARANCE UR: CLEAR — SIGNIFICANT CHANGE UP
AST SERPL-CCNC: 24 U/L — SIGNIFICANT CHANGE UP (ref 10–40)
BILIRUB DIRECT SERPL-MCNC: 0.2 MG/DL — SIGNIFICANT CHANGE UP (ref 0–0.2)
BILIRUB INDIRECT FLD-MCNC: 0.6 MG/DL — SIGNIFICANT CHANGE UP (ref 0.2–1)
BILIRUB SERPL-MCNC: 0.8 MG/DL — SIGNIFICANT CHANGE UP (ref 0.2–1.2)
BILIRUB UR-MCNC: NEGATIVE — SIGNIFICANT CHANGE UP
BUN SERPL-MCNC: 6 MG/DL — LOW (ref 7–23)
BUN SERPL-MCNC: 9 MG/DL — SIGNIFICANT CHANGE UP (ref 7–23)
CALCIUM SERPL-MCNC: 7.7 MG/DL — LOW (ref 8.4–10.5)
CALCIUM SERPL-MCNC: 8 MG/DL — LOW (ref 8.4–10.5)
CHLORIDE SERPL-SCNC: 100 MMOL/L — SIGNIFICANT CHANGE UP (ref 96–108)
CHLORIDE SERPL-SCNC: 101 MMOL/L — SIGNIFICANT CHANGE UP (ref 96–108)
CK MB BLD-MCNC: 0.6 % — SIGNIFICANT CHANGE UP (ref 0–3.5)
CK MB CFR SERPL CALC: 1.5 NG/ML — SIGNIFICANT CHANGE UP (ref 0–3.8)
CK SERPL-CCNC: 232 U/L — HIGH (ref 25–170)
CO2 SERPL-SCNC: 21 MMOL/L — LOW (ref 22–31)
CO2 SERPL-SCNC: 24 MMOL/L — SIGNIFICANT CHANGE UP (ref 22–31)
COLOR SPEC: SIGNIFICANT CHANGE UP
CREAT ?TM UR-MCNC: 48 MG/DL — SIGNIFICANT CHANGE UP
CREAT SERPL-MCNC: 0.3 MG/DL — LOW (ref 0.5–1.3)
CREAT SERPL-MCNC: 0.42 MG/DL — LOW (ref 0.5–1.3)
DIFF PNL FLD: NEGATIVE — SIGNIFICANT CHANGE UP
GLUCOSE SERPL-MCNC: 151 MG/DL — HIGH (ref 70–99)
GLUCOSE SERPL-MCNC: 172 MG/DL — HIGH (ref 70–99)
GLUCOSE UR QL: ABNORMAL
HCT VFR BLD CALC: 25.1 % — LOW (ref 34.5–45)
HCT VFR BLD CALC: 27.4 % — LOW (ref 34.5–45)
HGB BLD-MCNC: 8.7 G/DL — LOW (ref 11.5–15.5)
HGB BLD-MCNC: 9.6 G/DL — LOW (ref 11.5–15.5)
KETONES UR-MCNC: NEGATIVE — SIGNIFICANT CHANGE UP
LEUKOCYTE ESTERASE UR-ACNC: NEGATIVE — SIGNIFICANT CHANGE UP
MAGNESIUM SERPL-MCNC: 2.1 MG/DL — SIGNIFICANT CHANGE UP (ref 1.6–2.6)
MCHC RBC-ENTMCNC: 30.2 PG — SIGNIFICANT CHANGE UP (ref 27–34)
MCHC RBC-ENTMCNC: 30.6 PG — SIGNIFICANT CHANGE UP (ref 27–34)
MCHC RBC-ENTMCNC: 34.7 GM/DL — SIGNIFICANT CHANGE UP (ref 32–36)
MCHC RBC-ENTMCNC: 35 GM/DL — SIGNIFICANT CHANGE UP (ref 32–36)
MCV RBC AUTO: 87.2 FL — SIGNIFICANT CHANGE UP (ref 80–100)
MCV RBC AUTO: 87.3 FL — SIGNIFICANT CHANGE UP (ref 80–100)
NITRITE UR-MCNC: NEGATIVE — SIGNIFICANT CHANGE UP
NRBC # BLD: 0 /100 WBCS — SIGNIFICANT CHANGE UP (ref 0–0)
NRBC # BLD: 0 /100 WBCS — SIGNIFICANT CHANGE UP (ref 0–0)
OSMOLALITY SERPL: 278 MOSMOL/KG — LOW (ref 280–301)
OSMOLALITY UR: 445 MOS/KG — SIGNIFICANT CHANGE UP (ref 300–900)
PH UR: 6.5 — SIGNIFICANT CHANGE UP (ref 5–8)
PHOSPHATE SERPL-MCNC: 2.4 MG/DL — LOW (ref 2.5–4.5)
PLATELET # BLD AUTO: 77 K/UL — LOW (ref 150–400)
PLATELET # BLD AUTO: 92 K/UL — LOW (ref 150–400)
POTASSIUM SERPL-MCNC: 4.1 MMOL/L — SIGNIFICANT CHANGE UP (ref 3.5–5.3)
POTASSIUM SERPL-MCNC: 4.4 MMOL/L — SIGNIFICANT CHANGE UP (ref 3.5–5.3)
POTASSIUM SERPL-SCNC: 4.1 MMOL/L — SIGNIFICANT CHANGE UP (ref 3.5–5.3)
POTASSIUM SERPL-SCNC: 4.4 MMOL/L — SIGNIFICANT CHANGE UP (ref 3.5–5.3)
POTASSIUM UR-SCNC: 38 MMOL/L — SIGNIFICANT CHANGE UP
PROT SERPL-MCNC: 5.6 G/DL — LOW (ref 6–8.3)
PROT UR-MCNC: NEGATIVE — SIGNIFICANT CHANGE UP
RBC # BLD: 2.88 M/UL — LOW (ref 3.8–5.2)
RBC # BLD: 3.14 M/UL — LOW (ref 3.8–5.2)
RBC # FLD: 13.4 % — SIGNIFICANT CHANGE UP (ref 10.3–14.5)
RBC # FLD: 13.5 % — SIGNIFICANT CHANGE UP (ref 10.3–14.5)
SARS-COV-2 RNA SPEC QL NAA+PROBE: SIGNIFICANT CHANGE UP
SODIUM SERPL-SCNC: 132 MMOL/L — LOW (ref 135–145)
SODIUM SERPL-SCNC: 133 MMOL/L — LOW (ref 135–145)
SODIUM UR-SCNC: 158 MMOL/L — SIGNIFICANT CHANGE UP
SODIUM UR-SCNC: 96 MMOL/L — SIGNIFICANT CHANGE UP
SP GR SPEC: 1.02 — SIGNIFICANT CHANGE UP (ref 1.01–1.02)
TROPONIN T, HIGH SENSITIVITY RESULT: 7 NG/L — SIGNIFICANT CHANGE UP (ref 0–51)
TSH SERPL-MCNC: 1.2 UIU/ML — SIGNIFICANT CHANGE UP (ref 0.27–4.2)
UROBILINOGEN FLD QL: NEGATIVE — SIGNIFICANT CHANGE UP
WBC # BLD: 9.25 K/UL — SIGNIFICANT CHANGE UP (ref 3.8–10.5)
WBC # BLD: 9.76 K/UL — SIGNIFICANT CHANGE UP (ref 3.8–10.5)
WBC # FLD AUTO: 9.25 K/UL — SIGNIFICANT CHANGE UP (ref 3.8–10.5)
WBC # FLD AUTO: 9.76 K/UL — SIGNIFICANT CHANGE UP (ref 3.8–10.5)

## 2021-04-22 PROCEDURE — 99233 SBSQ HOSP IP/OBS HIGH 50: CPT

## 2021-04-22 PROCEDURE — 71045 X-RAY EXAM CHEST 1 VIEW: CPT | Mod: 26

## 2021-04-22 PROCEDURE — 93970 EXTREMITY STUDY: CPT | Mod: 26

## 2021-04-22 PROCEDURE — 93010 ELECTROCARDIOGRAM REPORT: CPT

## 2021-04-22 RX ORDER — LEVOTHYROXINE SODIUM 125 MCG
112 TABLET ORAL DAILY
Refills: 0 | Status: DISCONTINUED | OUTPATIENT
Start: 2021-04-22 | End: 2021-04-27

## 2021-04-22 RX ORDER — CALCIUM GLUCONATE 100 MG/ML
1 VIAL (ML) INTRAVENOUS ONCE
Refills: 0 | Status: COMPLETED | OUTPATIENT
Start: 2021-04-22 | End: 2021-04-22

## 2021-04-22 RX ORDER — ENOXAPARIN SODIUM 100 MG/ML
40 INJECTION SUBCUTANEOUS EVERY 24 HOURS
Refills: 0 | Status: DISCONTINUED | OUTPATIENT
Start: 2021-04-22 | End: 2021-04-27

## 2021-04-22 RX ORDER — MAGNESIUM SULFATE 500 MG/ML
2 VIAL (ML) INJECTION ONCE
Refills: 0 | Status: COMPLETED | OUTPATIENT
Start: 2021-04-22 | End: 2021-04-22

## 2021-04-22 RX ORDER — METOPROLOL TARTRATE 50 MG
5 TABLET ORAL ONCE
Refills: 0 | Status: COMPLETED | OUTPATIENT
Start: 2021-04-22 | End: 2021-04-22

## 2021-04-22 RX ORDER — METOPROLOL TARTRATE 50 MG
25 TABLET ORAL
Refills: 0 | Status: DISCONTINUED | OUTPATIENT
Start: 2021-04-22 | End: 2021-04-23

## 2021-04-22 RX ORDER — POTASSIUM CHLORIDE 20 MEQ
40 PACKET (EA) ORAL ONCE
Refills: 0 | Status: COMPLETED | OUTPATIENT
Start: 2021-04-22 | End: 2021-04-22

## 2021-04-22 RX ORDER — ADENOSINE 3 MG/ML
6 INJECTION INTRAVENOUS ONCE
Refills: 0 | Status: COMPLETED | OUTPATIENT
Start: 2021-04-22 | End: 2021-04-22

## 2021-04-22 RX ORDER — SODIUM CHLORIDE 9 MG/ML
1000 INJECTION INTRAMUSCULAR; INTRAVENOUS; SUBCUTANEOUS
Refills: 0 | Status: DISCONTINUED | OUTPATIENT
Start: 2021-04-22 | End: 2021-04-23

## 2021-04-22 RX ORDER — HYDROMORPHONE HYDROCHLORIDE 2 MG/ML
0.5 INJECTION INTRAMUSCULAR; INTRAVENOUS; SUBCUTANEOUS ONCE
Refills: 0 | Status: DISCONTINUED | OUTPATIENT
Start: 2021-04-22 | End: 2021-04-22

## 2021-04-22 RX ORDER — SODIUM CHLORIDE 9 MG/ML
1 INJECTION INTRAMUSCULAR; INTRAVENOUS; SUBCUTANEOUS EVERY 8 HOURS
Refills: 0 | Status: DISCONTINUED | OUTPATIENT
Start: 2021-04-22 | End: 2021-04-25

## 2021-04-22 RX ADMIN — Medication 100 MILLIEQUIVALENT(S): at 01:45

## 2021-04-22 RX ADMIN — LIDOCAINE 1 PATCH: 4 CREAM TOPICAL at 23:11

## 2021-04-22 RX ADMIN — SODIUM CHLORIDE 1 GRAM(S): 9 INJECTION INTRAMUSCULAR; INTRAVENOUS; SUBCUTANEOUS at 22:15

## 2021-04-22 RX ADMIN — ADENOSINE 6 MILLIGRAM(S): 3 INJECTION INTRAVENOUS at 14:05

## 2021-04-22 RX ADMIN — Medication 40 MILLIEQUIVALENT(S): at 03:52

## 2021-04-22 RX ADMIN — CHLORHEXIDINE GLUCONATE 1 APPLICATION(S): 213 SOLUTION TOPICAL at 05:26

## 2021-04-22 RX ADMIN — Medication 400 MILLIGRAM(S): at 20:15

## 2021-04-22 RX ADMIN — SODIUM CHLORIDE 50 MILLILITER(S): 9 INJECTION INTRAMUSCULAR; INTRAVENOUS; SUBCUTANEOUS at 14:28

## 2021-04-22 RX ADMIN — LEVETIRACETAM 400 MILLIGRAM(S): 250 TABLET, FILM COATED ORAL at 05:25

## 2021-04-22 RX ADMIN — Medication 1000 MILLIGRAM(S): at 20:45

## 2021-04-22 RX ADMIN — Medication 5 MILLIGRAM(S): at 13:38

## 2021-04-22 RX ADMIN — HYDROMORPHONE HYDROCHLORIDE 0.5 MILLIGRAM(S): 2 INJECTION INTRAMUSCULAR; INTRAVENOUS; SUBCUTANEOUS at 13:42

## 2021-04-22 RX ADMIN — HYDROMORPHONE HYDROCHLORIDE 0.5 MILLIGRAM(S): 2 INJECTION INTRAMUSCULAR; INTRAVENOUS; SUBCUTANEOUS at 13:38

## 2021-04-22 RX ADMIN — Medication 5 MILLIGRAM(S): at 13:39

## 2021-04-22 RX ADMIN — LEVETIRACETAM 400 MILLIGRAM(S): 250 TABLET, FILM COATED ORAL at 17:14

## 2021-04-22 RX ADMIN — Medication 400 MILLIGRAM(S): at 11:03

## 2021-04-22 RX ADMIN — OXYCODONE HYDROCHLORIDE 5 MILLIGRAM(S): 5 TABLET ORAL at 00:00

## 2021-04-22 RX ADMIN — Medication 1000 MILLIGRAM(S): at 04:30

## 2021-04-22 RX ADMIN — LIDOCAINE 1 PATCH: 4 CREAM TOPICAL at 19:30

## 2021-04-22 RX ADMIN — ENOXAPARIN SODIUM 40 MILLIGRAM(S): 100 INJECTION SUBCUTANEOUS at 11:39

## 2021-04-22 RX ADMIN — Medication 50 GRAM(S): at 14:42

## 2021-04-22 RX ADMIN — Medication 400 MILLIGRAM(S): at 03:52

## 2021-04-22 RX ADMIN — ESCITALOPRAM OXALATE 10 MILLIGRAM(S): 10 TABLET, FILM COATED ORAL at 11:37

## 2021-04-22 RX ADMIN — Medication 25 MILLIGRAM(S): at 17:14

## 2021-04-22 RX ADMIN — Medication 112 MICROGRAM(S): at 06:57

## 2021-04-22 RX ADMIN — Medication 100 GRAM(S): at 14:45

## 2021-04-22 RX ADMIN — Medication 1000 MILLIGRAM(S): at 11:30

## 2021-04-22 RX ADMIN — Medication 25 MILLIGRAM(S): at 05:26

## 2021-04-22 RX ADMIN — Medication 50 GRAM(S): at 13:43

## 2021-04-22 RX ADMIN — LIDOCAINE 1 PATCH: 4 CREAM TOPICAL at 11:36

## 2021-04-22 RX ADMIN — SODIUM CHLORIDE 1 GRAM(S): 9 INJECTION INTRAMUSCULAR; INTRAVENOUS; SUBCUTANEOUS at 14:40

## 2021-04-22 NOTE — CONSULT NOTE ADULT - SUBJECTIVE AND OBJECTIVE BOX
Patient seen and evaluated at bedside    Chief Complaint:    HPI:  65y Female with h/o HTN, breast CA s/p L mastectomy, hypothyroidism admitted as level II trauma s/p fall down stairs. Patient was found to have  the following injuries: L nasal bone fracture; L lateral orbit wall fracture; small scattered B/L SAHs; minimally displaced L clavicular fracture; minimally displaced sternal fracture; L 3-8 rib fractures; small L pneumothorax; R 7th rib fracture; likely chronic 6th and 8th rib fracture; acute displaced, comminuted fracture of the L iliac wing, and a R renal lesion.  Patient also found to have large hematoma near the iliac wing fracture.  Was also found to have bilateral frontal SAH. EP consulted for two episodes of SVT (first episode about 2 days ago and the second episode today that broke with 6mg adenosine). HR now are in 80s, -140/80-90s. Patient does not recall whether she was symptomatic during these episodes of SVT. She was started on lopressor 25mg BID 2 days ago after the first episode.     PMHx:   Hypothyroidism    Hyperthyroidism    Breast cancer        PSHx:   Salivary Gland Disease    C Section    Abnormality, eye    S/P D&amp;C (status post dilation and curettage)    H/O left mastectomy        Allergies:  Tapazole (Hives)      Home Meds:    Current Medications:   acetaminophen  IVPB .. 1000 milliGRAM(s) IV Intermittent every 6 hours PRN  chlorhexidine 2% Cloths 1 Application(s) Topical <User Schedule>  enoxaparin Injectable 40 milliGRAM(s) SubCutaneous every 24 hours  escitalopram 10 milliGRAM(s) Oral daily  levETIRAcetam  IVPB 500 milliGRAM(s) IV Intermittent every 12 hours  levothyroxine 112 MICROGram(s) Oral daily  lidocaine   Patch 1 Patch Transdermal daily  metoprolol tartrate 25 milliGRAM(s) Oral four times a day  oxyCODONE    IR 5 milliGRAM(s) Oral every 4 hours PRN  sodium chloride 1 Gram(s) Oral every 8 hours  sodium chloride 0.9%. 1000 milliLiter(s) IV Continuous <Continuous>      FAMILY HISTORY:      Physical Exam:  T(F): 99.1 (04-22), Max: 99.7 (04-22)  HR: 64 (04-22) (54 - 167)  BP: 104/59 (04-22) (90/54 - 164/81)  RR: 14 (04-22)  SpO2: 95% (04-22)    Cardiovascular Diagnostic Testing:    ECG: Personally reviewed:    Echo: Personally reviewed:    Stress Testing:    Cath:    Imaging:    CXR: Personally reviewed    Labs: Personally reviewed                        9.6    9.76  )-----------( 92       ( 22 Apr 2021 10:00 )             27.4     04-22    133<L>  |  100  |  9   ----------------------------<  151<H>  4.4   |  21<L>  |  0.42<L>    Ca    7.7<L>      22 Apr 2021 13:44  Phos  2.4     04-22  Mg     2.1     04-22    TPro  5.6<L>  /  Alb  3.1<L>  /  TBili  0.8  /  DBili  0.2  /  AST  24  /  ALT  31  /  AlkPhos  63  04-22    PT/INR - ( 21 Apr 2021 21:53 )   PT: 12.5 sec;   INR: 1.04 ratio         PTT - ( 21 Apr 2021 21:53 )  PTT:25.8 sec

## 2021-04-22 NOTE — PROGRESS NOTE ADULT - SUBJECTIVE AND OBJECTIVE BOX
Interval events:  - Transfused 1u pRBC, Hct 20.9 -> 26.9. SVT, given adenosine and metoprolol 25mg q12hr.    Patient seen and examined at bedside. Feeling well.  Pain is well controlled.  Tolerating diet without  nausea or vomiting. + Flatus, no BM yet.      Physical Exam  General Appearance: Resting comfortably, no acute distress  Chest: non-labored breathing, no respiratory distress  CV: Pulse regular presently  Abdomen: Soft, non-tender, non-distended, no peritonitis   Incision: C/I/D  Extremities: warm and well perfused      Vital Signs Last 24 Hrs  T(C): 37.6 (22 Apr 2021 11:00), Max: 37.6 (22 Apr 2021 09:00)  T(F): 99.7 (22 Apr 2021 11:00), Max: 99.7 (22 Apr 2021 09:00)  HR: 69 (22 Apr 2021 12:00) (63 - 87)  BP: 111/68 (22 Apr 2021 12:00) (108/61 - 164/81)  BP(mean): 85 (22 Apr 2021 12:00) (80 - 112)  RR: 21 (22 Apr 2021 12:00) (16 - 26)  SpO2: 97% (22 Apr 2021 12:00) (92% - 97%)    I&O's Detail    21 Apr 2021 07:01  -  22 Apr 2021 07:00  --------------------------------------------------------  IN:    IV PiggyBack: 933.3 mL    IV PiggyBack: 850 mL    Oral Fluid: 700 mL    PRBCs (Packed Red Blood Cells): 300 mL    sodium chloride 0.9%: 200 mL    sodium chloride 0.9% w/ Additives: 900 mL  Total IN: 3883.3 mL    OUT:    Indwelling Catheter - Urethral (mL): 4000 mL  Total OUT: 4000 mL    Total NET: -116.7 mL      22 Apr 2021 07:01  -  22 Apr 2021 13:51  --------------------------------------------------------  IN:    sodium chloride 0.9%: 150 mL  Total IN: 150 mL    OUT:    Indwelling Catheter - Urethral (mL): 700 mL  Total OUT: 700 mL    Total NET: -550 mL          04-22    132<L>  |  101  |  6<L>  ----------------------------<  172<H>  4.1   |  24  |  0.30<L>    Ca    8.0<L>      22 Apr 2021 10:00  Phos  3.6     04-21  Mg     2.1     04-21                              9.6    9.76  )-----------( 92       ( 22 Apr 2021 10:00 )             27.4       PT/INR - ( 21 Apr 2021 21:53 )   PT: 12.5 sec;   INR: 1.04 ratio         PTT - ( 21 Apr 2021 21:53 )  PTT:25.8 sec    LABS:                         9.6    9.76  )-----------( 92       ( 22 Apr 2021 10:00 )             27.4     04-22    132<L>  |  101  |  6<L>  ----------------------------<  172<H>  4.1   |  24  |  0.30<L>    Ca    8.0<L>      22 Apr 2021 10:00  Phos  3.6     04-21  Mg     2.1     04-21      PT/INR - ( 21 Apr 2021 21:53 )   PT: 12.5 sec;   INR: 1.04 ratio         PTT - ( 21 Apr 2021 21:53 )  PTT:25.8 sec          RADIOLOGY, EKG & ADDITIONAL TESTS: Reviewed.     MEDICATIONS  (STANDING):  aDENosine Injectable (ADENOCARD) 6 milliGRAM(s) IV Push once  chlorhexidine 2% Cloths 1 Application(s) Topical <User Schedule>  enoxaparin Injectable 40 milliGRAM(s) SubCutaneous every 24 hours  escitalopram 10 milliGRAM(s) Oral daily  levETIRAcetam  IVPB 500 milliGRAM(s) IV Intermittent every 12 hours  levothyroxine 112 MICROGram(s) Oral daily  lidocaine   Patch 1 Patch Transdermal daily  metoprolol tartrate 25 milliGRAM(s) Oral four times a day  sodium chloride 1 Gram(s) Oral every 8 hours  sodium chloride 0.9%. 1000 milliLiter(s) (50 mL/Hr) IV Continuous <Continuous>    MEDICATIONS  (PRN):  acetaminophen  IVPB .. 1000 milliGRAM(s) IV Intermittent every 6 hours PRN Mild Pain (1 - 3)  oxyCODONE    IR 5 milliGRAM(s) Oral every 4 hours PRN Moderate Pain (4 - 6)

## 2021-04-22 NOTE — PROGRESS NOTE ADULT - ASSESSMENT
65y Female with h/o HTN, breast CA s/p L mastectomy, hypothyroidism admitted as level II trauma s/p fall down stairs.  As per son, patient is able to care for herself at baseline.  Patient only able to say "ow" in the trauma bay.  Initial BP 140s, but decreased to 90s on repeat.  Started on IVF.  Secondary survey positive for L scalp laceration, L chest wall tenderness, and L pelvic tenderness.  Patient more conversive upon arrival to SICU, GCS 13 (E3, V4, M6).  Patient became hypotensive to SBP 90s and received additional 1L bolus.  Imaging revealed the following injuries: L nasal bone fracture; L lateral orbit wall fracture; small scattered B/L SAHs; minimally displaced L clavicular fracture; minimally displaced sternal fracture; L 3-8 rib fractures; small L pneumothorax; R 7th rib fracture; likely chronic 6th and 8th rib fracture; acute displaced, comminuted fracture of the L iliac wing, and a R renal lesion.  Patient also found to have large hematoma near the iliac wing fracture.  Patient now s/p hemorrhage watch. Head CT stable.     PLAN  Neuro:  small scattered B/L traumatic SAHs  - Neuro checks q 1hr  - Keppra 500 q 12hrs x 7 days  - Pain control with standing Tylenol, Lidoderm patch, oxy prn  - Cervical collar  - Neurosurgery following  - Home lexapro    Resp: Acute B/L rib fractures, small L pneumothorax  - Encourage incentive spirometry  - f/u am CXR  - Pain control as above     CV: Hypotension, acute sternal fracture, new SVT  - Hemodynamic monitoring  - Lactate cleared  - EKG for sternal fracture WNL  - Metoprolol 25mg q12hrs for SVT    GI:   - Regular diet, although poor intake    /Renal: Incidental finding of R renal lesion  - Inform patient/HCP of incidental findings  - NS + 20K @ 50mL/hr to supplement poor PO intake    ID:  - Monitor clinically for signs of active infection    Heme: Hematoma surrounding iliac wing fracture  - Hemorrhage watch passed  - Holding chemical VTE prophylaxis for H/H downtrend requiring blood transfusions  - SCDs     Endo:   - Monitor serum glucose   - HgA1c 5.7% on 04/21  - Home synthroid    MSK:  - Ortho following for iliac wing fracture  - Hand consult for possible L 5th finger fracture  - Plastics consult for nasal bone fracture     DISPO: Will remain in SICU  Code Status: Full code 65y Female with h/o HTN, breast CA s/p L mastectomy, hypothyroidism admitted as level II trauma s/p fall down stairs.  As per son, patient is able to care for herself at baseline.  Patient only able to say "ow" in the trauma bay.  Initial BP 140s, but decreased to 90s on repeat.  Started on IVF.  Secondary survey positive for L scalp laceration, L chest wall tenderness, and L pelvic tenderness.  Patient more conversive upon arrival to SICU, GCS 13 (E3, V4, M6).  Patient became hypotensive to SBP 90s and received additional 1L bolus.  Imaging revealed the following injuries: L nasal bone fracture; L lateral orbit wall fracture; small scattered B/L SAHs; minimally displaced L clavicular fracture; minimally displaced sternal fracture; L 3-8 rib fractures; small L pneumothorax; R 7th rib fracture; likely chronic 6th and 8th rib fracture; acute displaced, comminuted fracture of the L iliac wing, and a R renal lesion.  Patient also found to have large hematoma near the iliac wing fracture.  Patient now s/p hemorrhage watch. Head CT stable.     PLAN  Neuro:  small scattered B/L traumatic SAHs  - Neuro checks q 1hr  - Keppra 500 q 12hrs x 7 days  - Pain control with standing Tylenol, Lidoderm patch, oxy prn  - Cervical collar  - Neurosurgery following  - Home lexapro    Resp: Acute B/L rib fractures, small L pneumothorax  - Encourage incentive spirometry  - f/u am CXR  - Pain control as above     CV: Hypotension, acute sternal fracture, new SVT  - Hemodynamic monitoring  - Lactate cleared  - EKG for sternal fracture WNL  - Metoprolol 25mg q12hrs for SVT    GI:   - Regular diet    /Renal: Incidental finding of R renal lesion  - Inform patient/HCP of incidental findings  - IVL    ID:  - Monitor clinically for signs of active infection    Heme: Hematoma surrounding iliac wing fracture  - Hemorrhage watch passed  - Holding chemical VTE prophylaxis for H/H downtrend requiring blood transfusions  - SCDs     Endo:   - Monitor serum glucose   - HgA1c 5.7% on 04/21  - Home synthroid    MSK:  - Ortho following for iliac wing fracture  - Hand consult for possible L 5th finger fracture  - Plastics consult for nasal bone fracture     DISPO: Will remain in SICU  Code Status: Full code

## 2021-04-22 NOTE — PROGRESS NOTE ADULT - SUBJECTIVE AND OBJECTIVE BOX
HISTORY  65y Female with h/o HTN, breast CA s/p L mastectomy, hypothyroidism admitted as level II trauma s/p fall down stairs.  As per son, patient is able to care for herself at baseline.  Patient only able to say "ow" in the trauma bay.  Initial BP 140s, but decreased to 90s on repeat.  Started on IVF.  Secondary survey positive for L scalp laceration, L chest wall tenderness, and L pelvic tenderness.  Patient more conversive upon arrival to SICU, GCS 13 (E3, V4, M6).  Patient became hypotensive to SBP 90s and received additional 1L bolus.  Imaging revealed the following injuries: L nasal bone fracture; L lateral orbit wall fracture; small scattered B/L SAHs; minimally displaced L clavicular fracture; minimally displaced sternal fracture; L 3-8 rib fractures; small L pneumothorax; R 7th rib fracture; likely chronic 6th and 8th rib fracture; acute displaced, comminuted fracture of the L iliac wing, and a R renal lesion.  Patient also found to have large hematoma near the iliac wing fracture.      24 HOUR EVENTS:  - Transfused 1u pRBC, Hct 20.9 -> 26.9  - Went into SVT, given adenosine x1 and started on metoprolol 25mg q12hr  - Added oxycodone 5mg q4hrs prn for increased pain control      NEURO  Exam: AOx2, following commands  Meds: acetaminophen  IVPB .. 1000 milliGRAM(s) IV Intermittent every 6 hours PRN Mild Pain (1 - 3)  escitalopram 10 milliGRAM(s) Oral daily  levETIRAcetam  IVPB 500 milliGRAM(s) IV Intermittent every 12 hours  oxyCODONE    IR 5 milliGRAM(s) Oral every 4 hours PRN Moderate Pain (4 - 6)  [x] Adequacy of sedation and pain control has been assessed and adjusted      RESPIRATORY  RR: 22 (04-22-21 @ 02:00) (12 - 27)  SpO2: 92% (04-22-21 @ 02:00) (87% - 97%)  Exam: unlabored respirations, saturating well on 3L via nasal cannula  Meds:       CARDIOVASCULAR  HR: 67 (04-22-21 @ 02:00) (63 - 170)  BP: 115/82 (04-22-21 @ 02:00) (108/63 - 162/73)  BP(mean): 95 (04-22-21 @ 02:00) (80 - 105)  VBG - ( 21 Apr 2021 00:29 )  pH: 7.42  /  pCO2: 36    /  pO2: 55    / HCO3: 23    / Base Excess: -1.0  /  SaO2: 91     Lactate: 1.9    Exam: RRR  Cardiac Rhythm: normal sinus  Perfusion     [x]Adequate   [ ]Inadequate  Mentation   [ ]Normal       [x]Reduced  Extremities  [x]Warm         [ ]Cool  Volume Status [ ]Hypervolemic [x]Euvolemic [ ]Hypovolemic  Meds: metoprolol tartrate 25 milliGRAM(s) Oral every 12 hours      GI/NUTRITION  Exam: soft, nontender, nondistended  Diet: regular  Meds:       GENITOURINARY  I&O's Detail    04-20 @ 07:01 - 04-21 @ 07:00  --------------------------------------------------------  IN:    IV PiggyBack: 200 mL    IV PiggyBack: 1416.6 mL    Oral Fluid: 450 mL    sodium chloride 0.9%: 700 mL    sodium chloride 0.9%: 400 mL  Total IN: 3166.6 mL    OUT:    Indwelling Catheter - Urethral (mL): 1455 mL  Total OUT: 1455 mL    Total NET: 1711.6 mL    04-21 @ 07:01 - 04-22 @ 02:39  --------------------------------------------------------  IN:    IV PiggyBack: 750 mL    IV PiggyBack: 833.3 mL    Oral Fluid: 400 mL    PRBCs (Packed Red Blood Cells): 300 mL    sodium chloride 0.9%: 200 mL    sodium chloride 0.9% w/ Additives: 750 mL  Total IN: 3233.3 mL    OUT:    Indwelling Catheter - Urethral (mL): 2735 mL  Total OUT: 2735 mL    Total NET: 498.3 mL    04-21    133<L>  |  104  |  7   ----------------------------<  147<H>  3.3<L>   |  21<L>  |  0.34<L>    Ca    7.1<L>      21 Apr 2021 21:53  Phos  3.6     04-21  Mg     2.1     04-21    [x] Spangler catheter, indication: UOP monitoring  Meds: potassium chloride    Tablet ER 40 milliEquivalent(s) Oral once  sodium chloride 0.9% with potassium chloride 20 mEq/L 1000 milliLiter(s) IV Continuous <Continuous>      HEMATOLOGIC  Meds:   [ ] VTE Prophylaxis held due to the requiring of blood transfusions                        8.4    10.10 )-----------( 70       ( 21 Apr 2021 21:53 )             24.6     PT/INR - ( 21 Apr 2021 21:53 )   PT: 12.5 sec;   INR: 1.04 ratio    PTT - ( 21 Apr 2021 21:53 )  PTT:25.8 sec  Transfusion     [ ] PRBC   [ ] Platelets   [ ] FFP   [ ] Cryoprecipitate      INFECTIOUS DISEASES  T(C): 37.2 (04-21-21 @ 23:00), Max: 37.4 (04-21-21 @ 03:00)  WBC Count: 10.10 K/uL (04-21 @ 21:53)  WBC Count: 10.12 K/uL (04-21 @ 15:40)  WBC Count: 9.33 K/uL (04-21 @ 06:10)    Recent Cultures:    Meds:       ENDOCRINE  Capillary Blood Glucose    Meds: levothyroxine Injectable 84 MICROGram(s) IV Push at bedtime      ACCESS DEVICES:  [x] Peripheral IV  [ ] Central Venous Line	[ ] R	[ ] L	[ ] IJ	[ ] Fem	[ ] SC	Placed:   [ ] Arterial Line		[ ] R	[ ] L	[ ] Fem	[ ] Rad	[ ] Ax	Placed:   [ ] PICC:					[ ] Mediport  [x] Urinary Catheter, Date Placed: 4/19  [x] Necessity of urinary, arterial, and venous catheters discussed      OTHER MEDICATIONS:  chlorhexidine 2% Cloths 1 Application(s) Topical <User Schedule>  lidocaine   Patch 1 Patch Transdermal daily      CODE STATUS: full code      IMAGING: HISTORY  65y Female with h/o HTN, breast CA s/p L mastectomy, hypothyroidism admitted as level II trauma s/p fall down stairs.  As per son, patient is able to care for herself at baseline.  Patient only able to say "ow" in the trauma bay.  Initial BP 140s, but decreased to 90s on repeat.  Started on IVF.  Secondary survey positive for L scalp laceration, L chest wall tenderness, and L pelvic tenderness.  Patient more conversive upon arrival to SICU, GCS 13 (E3, V4, M6).  Patient became hypotensive to SBP 90s and received additional 1L bolus.  Imaging revealed the following injuries: L nasal bone fracture; L lateral orbit wall fracture; small scattered B/L SAHs; minimally displaced L clavicular fracture; minimally displaced sternal fracture; L 3-8 rib fractures; small L pneumothorax; R 7th rib fracture; likely chronic 6th and 8th rib fracture; acute displaced, comminuted fracture of the L iliac wing, and a R renal lesion.  Patient also found to have large hematoma near the iliac wing fracture.      24 HOUR EVENTS:  - Transfused 1u pRBC, Hct 20.9 -> 26.9  - Went into SVT, given adenosine x1 and started on metoprolol 25mg q12hr  - Added oxycodone 5mg q4hrs prn for increased pain control  - PO intake improved, IVL      NEURO  Exam: AOx2, following commands  Meds: acetaminophen  IVPB .. 1000 milliGRAM(s) IV Intermittent every 6 hours PRN Mild Pain (1 - 3)  escitalopram 10 milliGRAM(s) Oral daily  levETIRAcetam  IVPB 500 milliGRAM(s) IV Intermittent every 12 hours  oxyCODONE    IR 5 milliGRAM(s) Oral every 4 hours PRN Moderate Pain (4 - 6)  [x] Adequacy of sedation and pain control has been assessed and adjusted      RESPIRATORY  RR: 22 (04-22-21 @ 02:00) (12 - 27)  SpO2: 92% (04-22-21 @ 02:00) (87% - 97%)  Exam: unlabored respirations, saturating well on 3L via nasal cannula  Meds:       CARDIOVASCULAR  HR: 67 (04-22-21 @ 02:00) (63 - 170)  BP: 115/82 (04-22-21 @ 02:00) (108/63 - 162/73)  BP(mean): 95 (04-22-21 @ 02:00) (80 - 105)  VBG - ( 21 Apr 2021 00:29 )  pH: 7.42  /  pCO2: 36    /  pO2: 55    / HCO3: 23    / Base Excess: -1.0  /  SaO2: 91     Lactate: 1.9    Exam: RRR  Cardiac Rhythm: normal sinus  Perfusion     [x]Adequate   [ ]Inadequate  Mentation   [ ]Normal       [x]Reduced  Extremities  [x]Warm         [ ]Cool  Volume Status [ ]Hypervolemic [x]Euvolemic [ ]Hypovolemic  Meds: metoprolol tartrate 25 milliGRAM(s) Oral every 12 hours      GI/NUTRITION  Exam: soft, nontender, nondistended  Diet: regular  Meds:       GENITOURINARY  I&O's Detail    04-20 @ 07:01 - 04-21 @ 07:00  --------------------------------------------------------  IN:    IV PiggyBack: 200 mL    IV PiggyBack: 1416.6 mL    Oral Fluid: 450 mL    sodium chloride 0.9%: 700 mL    sodium chloride 0.9%: 400 mL  Total IN: 3166.6 mL    OUT:    Indwelling Catheter - Urethral (mL): 1455 mL  Total OUT: 1455 mL    Total NET: 1711.6 mL    04-21 @ 07:01 - 04-22 @ 02:39  --------------------------------------------------------  IN:    IV PiggyBack: 750 mL    IV PiggyBack: 833.3 mL    Oral Fluid: 400 mL    PRBCs (Packed Red Blood Cells): 300 mL    sodium chloride 0.9%: 200 mL    sodium chloride 0.9% w/ Additives: 750 mL  Total IN: 3233.3 mL    OUT:    Indwelling Catheter - Urethral (mL): 2735 mL  Total OUT: 2735 mL    Total NET: 498.3 mL    04-21    133<L>  |  104  |  7   ----------------------------<  147<H>  3.3<L>   |  21<L>  |  0.34<L>    Ca    7.1<L>      21 Apr 2021 21:53  Phos  3.6     04-21  Mg     2.1     04-21    [x] Spangler catheter, indication: UOP monitoring  Meds: potassium chloride    Tablet ER 40 milliEquivalent(s) Oral once  sodium chloride 0.9% with potassium chloride 20 mEq/L 1000 milliLiter(s) IV Continuous <Continuous>      HEMATOLOGIC  Meds:   [ ] VTE Prophylaxis held due to the requiring of blood transfusions                        8.4    10.10 )-----------( 70       ( 21 Apr 2021 21:53 )             24.6     PT/INR - ( 21 Apr 2021 21:53 )   PT: 12.5 sec;   INR: 1.04 ratio    PTT - ( 21 Apr 2021 21:53 )  PTT:25.8 sec  Transfusion     [ ] PRBC   [ ] Platelets   [ ] FFP   [ ] Cryoprecipitate      INFECTIOUS DISEASES  T(C): 37.2 (04-21-21 @ 23:00), Max: 37.4 (04-21-21 @ 03:00)  WBC Count: 10.10 K/uL (04-21 @ 21:53)  WBC Count: 10.12 K/uL (04-21 @ 15:40)  WBC Count: 9.33 K/uL (04-21 @ 06:10)    Recent Cultures:    Meds:       ENDOCRINE  Capillary Blood Glucose    Meds: levothyroxine Injectable 84 MICROGram(s) IV Push at bedtime      ACCESS DEVICES:  [x] Peripheral IV  [ ] Central Venous Line	[ ] R	[ ] L	[ ] IJ	[ ] Fem	[ ] SC	Placed:   [ ] Arterial Line		[ ] R	[ ] L	[ ] Fem	[ ] Rad	[ ] Ax	Placed:   [ ] PICC:					[ ] Mediport  [x] Urinary Catheter, Date Placed: 4/19  [x] Necessity of urinary, arterial, and venous catheters discussed      OTHER MEDICATIONS:  chlorhexidine 2% Cloths 1 Application(s) Topical <User Schedule>  lidocaine   Patch 1 Patch Transdermal daily      CODE STATUS: full code      IMAGING:

## 2021-04-22 NOTE — PROGRESS NOTE ADULT - ASSESSMENT
65F hx breast cancer s/p mastectomy, HTN, hypothryoidism, not on full anticoagulation/antiplatelets per EMS, presenting as a level 2 trauma activation after a fall from a flight of stairs found to have (1) SAH, (2) left-sided rib fractures, (3) left iliac wing fracture with hematoma, (4) left forehead laceration s/p successful closed reduction of left pinky PIP dislocation.      Plan:   - Neuro checks q 4hr  - Keppra 500 q 12hrs x 7 days  - Pain control with standing Tylenol, Lidoderm patch and Dilaudid prn. And added oxycodone 5mg q4hrs  - Incidental R renal lesion to be discussed  - Will require C-collar clearance (currently with distracting injury)  - Forehead laceration repair at bedside  - Reconsult PRS for nasal bone fx  - Tertiary exam  - Will obtain home medications from son  - Resume DVT ppx   - PT evaluation after full evaluation    [phone]

## 2021-04-22 NOTE — CONSULT NOTE ADULT - ASSESSMENT
65y Female with h/o HTN, breast CA s/p L mastectomy, hypothyroidism admitted as level II trauma s/p fall down stairs with several fractures and SAH. EP consulted for 2 episodes of SVT.  -SVT likely in the setting of her critical illness/injuries. She is still requiring blood transfusions and ICU level care for injuries. Prior episodes response to adenosine  -continue lopressor 25mg--> can uptitrate to 25mg TID or 50mg BID as tolerated by her BPs

## 2021-04-23 ENCOUNTER — TRANSCRIPTION ENCOUNTER (OUTPATIENT)
Age: 66
End: 2021-04-23

## 2021-04-23 LAB
ALBUMIN SERPL ELPH-MCNC: 2.8 G/DL — LOW (ref 3.3–5)
ALP SERPL-CCNC: 58 U/L — SIGNIFICANT CHANGE UP (ref 40–120)
ALT FLD-CCNC: 24 U/L — SIGNIFICANT CHANGE UP (ref 10–45)
ANION GAP SERPL CALC-SCNC: 14 MMOL/L — SIGNIFICANT CHANGE UP (ref 5–17)
ANION GAP SERPL CALC-SCNC: 8 MMOL/L — SIGNIFICANT CHANGE UP (ref 5–17)
APTT BLD: 26.8 SEC — LOW (ref 27.5–35.5)
AST SERPL-CCNC: 20 U/L — SIGNIFICANT CHANGE UP (ref 10–40)
BILIRUB SERPL-MCNC: 0.8 MG/DL — SIGNIFICANT CHANGE UP (ref 0.2–1.2)
BUN SERPL-MCNC: 10 MG/DL — SIGNIFICANT CHANGE UP (ref 7–23)
BUN SERPL-MCNC: 7 MG/DL — SIGNIFICANT CHANGE UP (ref 7–23)
CALCIUM SERPL-MCNC: 7.5 MG/DL — LOW (ref 8.4–10.5)
CALCIUM SERPL-MCNC: 7.8 MG/DL — LOW (ref 8.4–10.5)
CHLORIDE SERPL-SCNC: 102 MMOL/L — SIGNIFICANT CHANGE UP (ref 96–108)
CHLORIDE SERPL-SCNC: 104 MMOL/L — SIGNIFICANT CHANGE UP (ref 96–108)
CO2 SERPL-SCNC: 23 MMOL/L — SIGNIFICANT CHANGE UP (ref 22–31)
CO2 SERPL-SCNC: 23 MMOL/L — SIGNIFICANT CHANGE UP (ref 22–31)
CREAT SERPL-MCNC: 0.4 MG/DL — LOW (ref 0.5–1.3)
CREAT SERPL-MCNC: <0.3 MG/DL — LOW (ref 0.5–1.3)
GLUCOSE SERPL-MCNC: 123 MG/DL — HIGH (ref 70–99)
GLUCOSE SERPL-MCNC: 143 MG/DL — HIGH (ref 70–99)
HCT VFR BLD CALC: 25.3 % — LOW (ref 34.5–45)
HGB BLD-MCNC: 8.8 G/DL — LOW (ref 11.5–15.5)
INR BLD: 1.03 RATIO — SIGNIFICANT CHANGE UP (ref 0.88–1.16)
MAGNESIUM SERPL-MCNC: 2 MG/DL — SIGNIFICANT CHANGE UP (ref 1.6–2.6)
MAGNESIUM SERPL-MCNC: 2.1 MG/DL — SIGNIFICANT CHANGE UP (ref 1.6–2.6)
MCHC RBC-ENTMCNC: 30.3 PG — SIGNIFICANT CHANGE UP (ref 27–34)
MCHC RBC-ENTMCNC: 34.8 GM/DL — SIGNIFICANT CHANGE UP (ref 32–36)
MCV RBC AUTO: 87.2 FL — SIGNIFICANT CHANGE UP (ref 80–100)
NRBC # BLD: 0 /100 WBCS — SIGNIFICANT CHANGE UP (ref 0–0)
PHOSPHATE SERPL-MCNC: 1.8 MG/DL — LOW (ref 2.5–4.5)
PHOSPHATE SERPL-MCNC: 1.9 MG/DL — LOW (ref 2.5–4.5)
PLATELET # BLD AUTO: 97 K/UL — LOW (ref 150–400)
POTASSIUM SERPL-MCNC: 3.8 MMOL/L — SIGNIFICANT CHANGE UP (ref 3.5–5.3)
POTASSIUM SERPL-MCNC: 4 MMOL/L — SIGNIFICANT CHANGE UP (ref 3.5–5.3)
POTASSIUM SERPL-SCNC: 3.8 MMOL/L — SIGNIFICANT CHANGE UP (ref 3.5–5.3)
POTASSIUM SERPL-SCNC: 4 MMOL/L — SIGNIFICANT CHANGE UP (ref 3.5–5.3)
PROT SERPL-MCNC: 5 G/DL — LOW (ref 6–8.3)
PROTHROM AB SERPL-ACNC: 12.3 SEC — SIGNIFICANT CHANGE UP (ref 10.6–13.6)
RBC # BLD: 2.9 M/UL — LOW (ref 3.8–5.2)
RBC # FLD: 13.6 % — SIGNIFICANT CHANGE UP (ref 10.3–14.5)
SODIUM SERPL-SCNC: 135 MMOL/L — SIGNIFICANT CHANGE UP (ref 135–145)
SODIUM SERPL-SCNC: 139 MMOL/L — SIGNIFICANT CHANGE UP (ref 135–145)
WBC # BLD: 9.14 K/UL — SIGNIFICANT CHANGE UP (ref 3.8–10.5)
WBC # FLD AUTO: 9.14 K/UL — SIGNIFICANT CHANGE UP (ref 3.8–10.5)

## 2021-04-23 PROCEDURE — 93306 TTE W/DOPPLER COMPLETE: CPT | Mod: 26

## 2021-04-23 PROCEDURE — 99233 SBSQ HOSP IP/OBS HIGH 50: CPT

## 2021-04-23 PROCEDURE — 99232 SBSQ HOSP IP/OBS MODERATE 35: CPT

## 2021-04-23 PROCEDURE — 71045 X-RAY EXAM CHEST 1 VIEW: CPT | Mod: 26

## 2021-04-23 RX ORDER — METOPROLOL TARTRATE 50 MG
50 TABLET ORAL EVERY 8 HOURS
Refills: 0 | Status: DISCONTINUED | OUTPATIENT
Start: 2021-04-23 | End: 2021-04-23

## 2021-04-23 RX ORDER — SENNA PLUS 8.6 MG/1
2 TABLET ORAL AT BEDTIME
Refills: 0 | Status: DISCONTINUED | OUTPATIENT
Start: 2021-04-23 | End: 2021-04-27

## 2021-04-23 RX ORDER — ACETAMINOPHEN 500 MG
1000 TABLET ORAL EVERY 6 HOURS
Refills: 0 | Status: DISCONTINUED | OUTPATIENT
Start: 2021-04-23 | End: 2021-04-23

## 2021-04-23 RX ORDER — METOPROLOL TARTRATE 50 MG
50 TABLET ORAL EVERY 12 HOURS
Refills: 0 | Status: DISCONTINUED | OUTPATIENT
Start: 2021-04-23 | End: 2021-04-27

## 2021-04-23 RX ORDER — POTASSIUM CHLORIDE 20 MEQ
20 PACKET (EA) ORAL ONCE
Refills: 0 | Status: COMPLETED | OUTPATIENT
Start: 2021-04-23 | End: 2021-04-23

## 2021-04-23 RX ORDER — LEVETIRACETAM 250 MG/1
500 TABLET, FILM COATED ORAL
Refills: 0 | Status: COMPLETED | OUTPATIENT
Start: 2021-04-23 | End: 2021-04-26

## 2021-04-23 RX ORDER — ENOXAPARIN SODIUM 100 MG/ML
40 INJECTION SUBCUTANEOUS
Qty: 0 | Refills: 0 | DISCHARGE
Start: 2021-04-23

## 2021-04-23 RX ORDER — ACETAMINOPHEN 500 MG
1000 TABLET ORAL EVERY 6 HOURS
Refills: 0 | Status: DISCONTINUED | OUTPATIENT
Start: 2021-04-23 | End: 2021-04-27

## 2021-04-23 RX ORDER — MAGNESIUM SULFATE 500 MG/ML
2 VIAL (ML) INJECTION ONCE
Refills: 0 | Status: COMPLETED | OUTPATIENT
Start: 2021-04-23 | End: 2021-04-23

## 2021-04-23 RX ADMIN — Medication 400 MILLIGRAM(S): at 06:37

## 2021-04-23 RX ADMIN — Medication 250 MILLIMOLE(S): at 03:08

## 2021-04-23 RX ADMIN — SODIUM CHLORIDE 1 GRAM(S): 9 INJECTION INTRAMUSCULAR; INTRAVENOUS; SUBCUTANEOUS at 21:25

## 2021-04-23 RX ADMIN — LEVETIRACETAM 400 MILLIGRAM(S): 250 TABLET, FILM COATED ORAL at 06:07

## 2021-04-23 RX ADMIN — Medication 25 MILLIGRAM(S): at 06:08

## 2021-04-23 RX ADMIN — LIDOCAINE 1 PATCH: 4 CREAM TOPICAL at 12:11

## 2021-04-23 RX ADMIN — ESCITALOPRAM OXALATE 10 MILLIGRAM(S): 10 TABLET, FILM COATED ORAL at 12:12

## 2021-04-23 RX ADMIN — Medication 50 MILLIGRAM(S): at 17:33

## 2021-04-23 RX ADMIN — CHLORHEXIDINE GLUCONATE 1 APPLICATION(S): 213 SOLUTION TOPICAL at 06:08

## 2021-04-23 RX ADMIN — ENOXAPARIN SODIUM 40 MILLIGRAM(S): 100 INJECTION SUBCUTANEOUS at 12:11

## 2021-04-23 RX ADMIN — SODIUM CHLORIDE 1 GRAM(S): 9 INJECTION INTRAMUSCULAR; INTRAVENOUS; SUBCUTANEOUS at 13:22

## 2021-04-23 RX ADMIN — LIDOCAINE 1 PATCH: 4 CREAM TOPICAL at 22:59

## 2021-04-23 RX ADMIN — Medication 20 MILLIEQUIVALENT(S): at 17:32

## 2021-04-23 RX ADMIN — Medication 1000 MILLIGRAM(S): at 07:07

## 2021-04-23 RX ADMIN — Medication 250 MILLIMOLE(S): at 06:37

## 2021-04-23 RX ADMIN — Medication 50 GRAM(S): at 17:32

## 2021-04-23 RX ADMIN — SENNA PLUS 2 TABLET(S): 8.6 TABLET ORAL at 21:25

## 2021-04-23 RX ADMIN — Medication 25 MILLIGRAM(S): at 00:36

## 2021-04-23 RX ADMIN — LEVETIRACETAM 500 MILLIGRAM(S): 250 TABLET, FILM COATED ORAL at 17:33

## 2021-04-23 RX ADMIN — Medication 112 MICROGRAM(S): at 06:08

## 2021-04-23 RX ADMIN — Medication 83.33 MILLIMOLE(S): at 21:24

## 2021-04-23 RX ADMIN — SODIUM CHLORIDE 1 GRAM(S): 9 INJECTION INTRAMUSCULAR; INTRAVENOUS; SUBCUTANEOUS at 06:07

## 2021-04-23 NOTE — PROGRESS NOTE ADULT - ASSESSMENT
65y Female with h/o HTN, breast CA s/p L mastectomy, hypothyroidism admitted as level II trauma s/p fall down stairs.  As per son, patient is able to care for herself at baseline.  Patient only able to say "ow" in the trauma bay.  Initial BP 140s, but decreased to 90s on repeat.  Started on IVF.  Secondary survey positive for L scalp laceration, L chest wall tenderness, and L pelvic tenderness.  Patient more conversive upon arrival to SICU, GCS 13 (E3, V4, M6).  Patient became hypotensive to SBP 90s and received additional 1L bolus.  Imaging revealed the following injuries: L nasal bone fracture; L lateral orbit wall fracture; small scattered B/L SAHs; minimally displaced L clavicular fracture; minimally displaced sternal fracture; L 3-8 rib fractures; small L pneumothorax; R 7th rib fracture; likely chronic 6th and 8th rib fracture; acute displaced, comminuted fracture of the L iliac wing, and a R renal lesion.  Patient also found to have large hematoma near the iliac wing fracture.  Patient now s/p hemorrhage watch. Head CT stable.     PLAN  Neuro:  small scattered B/L traumatic SAHs  - Neuro checks q 4hr  - Keppra 500 q 12hrs x 7 days  - Pain control with standing Tylenol, Lidoderm patch, oxy prn  - Cervical collar, clear when mental status improves  - Neurosurgery following  - Home lexapro    Resp: Acute B/L rib fractures, small L pneumothorax  - Encourage incentive spirometry  - f/u am CXR  - Pain control as above     CV: Hypotension, acute sternal fracture, new SVT  - Hemodynamic monitoring  - Lactate cleared  - EKG for sternal fracture WNL  - Metoprolol 25mg q6hrs for SVT    GI:   - Regular diet    /Renal: Incidental finding of R renal lesion  - Inform patient/HCP of incidental findings  - NaCL 1g q8hrs + NS @50cc/hr for hyponatremia    ID:  - Monitor clinically for signs of active infection    Heme: Hematoma surrounding iliac wing fracture  - Hemorrhage watch passed  - Lovenox for VTE ppx  - SCDs     Endo:   - Monitor serum glucose   - HgA1c 5.7% on 04/21  - Home synthroid    MSK:  - Ortho following for iliac wing fracture  - Hand consult for possible L 5th finger fracture  - Plastics consult for nasal bone fracture     DISPO: Will remain in SICU  Code Status: Full code

## 2021-04-23 NOTE — PROGRESS NOTE ADULT - SUBJECTIVE AND OBJECTIVE BOX
HISTORY  65y Female with h/o HTN, breast CA s/p L mastectomy, hypothyroidism admitted as level II trauma s/p fall down stairs.  As per son, patient is able to care for herself at baseline.  Patient only able to say "ow" in the trauma bay.  Initial BP 140s, but decreased to 90s on repeat.  Started on IVF.  Secondary survey positive for L scalp laceration, L chest wall tenderness, and L pelvic tenderness.  Patient more conversive upon arrival to SICU, GCS 13 (E3, V4, M6).  Patient became hypotensive to SBP 90s and received additional 1L bolus.  Imaging revealed the following injuries: L nasal bone fracture; L lateral orbit wall fracture; small scattered B/L SAHs; minimally displaced L clavicular fracture; minimally displaced sternal fracture; L 3-8 rib fractures; small L pneumothorax; R 7th rib fracture; likely chronic 6th and 8th rib fracture; acute displaced, comminuted fracture of the L iliac wing, and a R renal lesion.  Patient also found to have large hematoma near the iliac wing fracture.      24 HOUR EVENTS:  - Pt went into SVT which broke after 6mg adenosine IVP and 5mg metoprolol IVP x2 doses. Metoprolol PO increased from 25mg q12hrs to 25mg q6hrs. Cardiology consulted  - Added salt tabs 1g q8hrs and started on NS @ 50cc/hr for hyponatremia  - Spangler discontinued, passed TOV  - Added lovenox      NEURO  Exam: AOx3, following commands, flat affect  Meds: escitalopram 10 milliGRAM(s) Oral daily  levETIRAcetam  IVPB 500 milliGRAM(s) IV Intermittent every 12 hours  oxyCODONE    IR 5 milliGRAM(s) Oral every 4 hours PRN Moderate Pain (4 - 6)  [x] Adequacy of sedation and pain control has been assessed and adjusted      RESPIRATORY  RR: 14 (04-23-21 @ 01:00) (12 - 24)  SpO2: 96% (04-23-21 @ 01:00) (92% - 98%)  Exam: unlabored respirations  Meds:       CARDIOVASCULAR  HR: 64 (04-23-21 @ 01:00) (54 - 167)  BP: 106/68 (04-23-21 @ 01:00) (90/54 - 164/81)  BP(mean): 82 (04-23-21 @ 01:00) (66 - 112)  Exam: RRR  Cardiac Rhythm: normal sinus  Perfusion     [x]Adequate   [ ]Inadequate  Mentation   [x]Normal       [ ]Reduced  Extremities  [x]Warm         [ ]Cool  Volume Status [ ]Hypervolemic [x]Euvolemic [ ]Hypovolemic  Meds: metoprolol tartrate 25 milliGRAM(s) Oral four times a day      GI/NUTRITION  Exam: soft, nontender, nondistended  Diet: regular  Meds:       GENITOURINARY  I&O's Detail    04-21 @ 07:01  -  04-22 @ 07:00  --------------------------------------------------------  IN:    IV PiggyBack: 933.3 mL    IV PiggyBack: 850 mL    Oral Fluid: 700 mL    PRBCs (Packed Red Blood Cells): 300 mL    sodium chloride 0.9%: 200 mL    sodium chloride 0.9% w/ Additives: 900 mL  Total IN: 3883.3 mL    OUT:    Indwelling Catheter - Urethral (mL): 4000 mL  Total OUT: 4000 mL    Total NET: -116.7 mL      04-22 @ 07:01  -  04-23 @ 01:48  --------------------------------------------------------  IN:    IV PiggyBack: 200 mL    Oral Fluid: 60 mL    sodium chloride 0.9%: 750 mL  Total IN: 1010 mL    OUT:    Indwelling Catheter - Urethral (mL): 700 mL    Intermittent Catheterization - Urethral (mL): 850 mL    Voided (mL): 1 mL  Total OUT: 1551 mL    Total NET: -541 mL    04-22    133<L>  |  100  |  9   ----------------------------<  151<H>  4.4   |  21<L>  |  0.42<L>    Ca    7.7<L>      22 Apr 2021 13:44  Phos  2.4     04-22  Mg     2.1     04-22    TPro  5.6<L>  /  Alb  3.1<L>  /  TBili  0.8  /  DBili  0.2  /  AST  24  /  ALT  31  /  AlkPhos  63  04-22    [ ] Spangler catheter, indication: none  Meds: sodium chloride 1 Gram(s) Oral every 8 hours  sodium chloride 0.9%. 1000 milliLiter(s) IV Continuous <Continuous>      HEMATOLOGIC  Meds: enoxaparin Injectable 40 milliGRAM(s) SubCutaneous every 24 hours  [x] VTE Prophylaxis                        9.6    9.76  )-----------( 92       ( 22 Apr 2021 10:00 )             27.4     PT/INR - ( 21 Apr 2021 21:53 )   PT: 12.5 sec;   INR: 1.04 ratio    PTT - ( 21 Apr 2021 21:53 )  PTT:25.8 sec  Transfusion     [ ] PRBC   [ ] Platelets   [ ] FFP   [ ] Cryoprecipitate      INFECTIOUS DISEASES  T(C): 36.8 (04-22-21 @ 23:00), Max: 37.6 (04-22-21 @ 09:00)  WBC Count: 9.76 K/uL (04-22 @ 10:00)  WBC Count: 9.25 K/uL (04-22 @ 04:11)    Recent Cultures:    Meds:       ENDOCRINE  Capillary Blood Glucose    Meds: levothyroxine 112 MICROGram(s) Oral daily      ACCESS DEVICES:  [x] Peripheral IV  [ ] Central Venous Line	[ ] R	[ ] L	[ ] IJ	[ ] Fem	[ ] SC	Placed:   [ ] Arterial Line		[ ] R	[ ] L	[ ] Fem	[ ] Rad	[ ] Ax	Placed:   [ ] PICC:					[ ] Mediport  [ ] Urinary Catheter, Date Placed:   [x] Necessity of urinary, arterial, and venous catheters discussed      OTHER MEDICATIONS:  chlorhexidine 2% Cloths 1 Application(s) Topical <User Schedule>  lidocaine   Patch 1 Patch Transdermal daily      CODE STATUS: full code      IMAGING:

## 2021-04-23 NOTE — DISCHARGE NOTE PROVIDER - NPI NUMBER (FOR SYS[employer] USE ONLY) :
[[id number]] [[id number]],[[id number]] [[id number]] [[id number]],[[id number]],[[id number]],[[id number]]

## 2021-04-23 NOTE — DISCHARGE NOTE PROVIDER - NSDCFUADDAPPT_GEN_ALL_CORE_FT
Please follow up with Dr. Herrera regarding R kidney mass. You may need further testing.  Please follow up with Dr. Herrera regarding R kidney mass. You may need further testing.     Please call Dr. Aquino's (Neurosurgery) office for an appointment to be seen 2 weeks after discharge for repeat imaging of your brain injury.    Please call Dr. Choe's (Plastic Surgery) office for an appointment to be seen 1 week after discharge for your left pinky dislocation (PIP joint). Please follow up with Dr. Herrera regarding R kidney mass. You may need further testing. She should also be aware that we have changed your blood pressure medications. You were on amlodipine (Norvasc). Because you had a fast heart rhythm (SVT), you were started on metoprolol, which decreases heart rate and also lowers blood pressure. Your amlodipine was stopped.    Please call Dr. Aquino's (Neurosurgery) office for an appointment to be seen 2 weeks after discharge for repeat imaging of your brain injury.    Please call Dr. Choe's (Plastic Surgery) office for an appointment to be seen 1 week after discharge for your left pinky dislocation (PIP joint), facial laceration with stitches, and left nasal bone and orbital rim fractures.    If needed, you can follow up with Dr. Cardoza (Orthopedics) for your left pelvic (iliac wing) and left clavicle (collar bone) fractures.     If needed, you can follow up with Dr. Peña (Trauma Surgery) for your broken ribs. Please follow up with Dr. Herrera regarding R kidney mass. You may need further testing. She should also be aware that we have changed your blood pressure medications. You were on amlodipine (Norvasc). Because you had a fast heart rhythm (SVT), you were started on metoprolol, which decreases heart rate and also lowers blood pressure. Your amlodipine was stopped.  Please follow up with Cardiology as an outpatient within 2 weeks (Dr. Hernández).  Continue Toprol  mg daily.    Please call Dr. Aquino's (Neurosurgery) office for an appointment to be seen 2 weeks after discharge for repeat imaging of your brain injury.    Please call Dr. Choe's (Plastic Surgery) office for an appointment to be seen 1 week after discharge for your left pinky dislocation (PIP joint), facial laceration with stitches, and left nasal bone and orbital rim fractures.    If needed, you can follow up with Dr. Cardoza (Orthopedics) for your left pelvic (iliac wing) and left clavicle (collar bone) fractures.     If needed, you can follow up with Dr. Peña (Trauma Surgery) for your broken ribs. Please follow up with Dr. Herrera regarding R kidney mass. You may need further testing. She should also be aware that we have changed your blood pressure medications. You were on amlodipine (Norvasc). Because you had a fast heart rhythm (SVT), you were started on metoprolol, which decreases heart rate and also lowers blood pressure. Your amlodipine was stopped.  Please follow up with Cardiology as an outpatient within 2 weeks (Dr. Hernández).  Continue Toprol  mg daily.    Please call Dr. Aquino's (Neurosurgery) office for an appointment to be seen 2 weeks after discharge for repeat imaging of your brain injury.    Please call Dr. Choe's (Plastic Surgery) office for an appointment to be seen within 1 week after discharge for your facial laceration with stitches, left pinky dislocation (PIP joint), and left nasal bone and orbital rim fractures.    If needed, you can follow up with Dr. Cardoza (Orthopedics) for your left pelvic (iliac wing) and left clavicle (collar bone) fractures.     If needed, you can follow up with Dr. Peña (Trauma Surgery) for your broken ribs.

## 2021-04-23 NOTE — PROGRESS NOTE ADULT - SUBJECTIVE AND OBJECTIVE BOX
PATIENT:  JONATHAN CHILD  [id number]    CHIEF COMPLAINT:  Patient is a 65y old  Female who presents with a chief complaint of Fall from flight of stairs (2021 09:11)      INTERVAL HISTORY: No overnight events. Lethargic however denies chest pain palpitations or SOB     REVIEW OF SYSTEMS:   Negative unless noted in HPI    MEDICATIONS  (STANDING):  chlorhexidine 2% Cloths 1 Application(s) Topical <User Schedule>  enoxaparin Injectable 40 milliGRAM(s) SubCutaneous every 24 hours  escitalopram 10 milliGRAM(s) Oral daily  levETIRAcetam 500 milliGRAM(s) Oral two times a day  levothyroxine 112 MICROGram(s) Oral daily  lidocaine   Patch 1 Patch Transdermal daily  metoprolol tartrate 50 milliGRAM(s) Oral every 12 hours  senna 2 Tablet(s) Oral at bedtime  sodium chloride 1 Gram(s) Oral every 8 hours    MEDICATIONS  (PRN):  acetaminophen  IVPB .. 1000 milliGRAM(s) IV Intermittent every 6 hours PRN Mild Pain (1 - 3)  oxyCODONE    IR 5 milliGRAM(s) Oral every 4 hours PRN Moderate Pain (4 - 6)      OBJECTIVE:  ICU Vital Signs Last 24 Hrs  T(C): 36.8 (2021 07:00), Max: 37.6 (2021 11:00)  T(F): 98.2 (2021 07:00), Max: 99.7 (2021 11:00)  HR: 83 (2021 10:00) (54 - 167)  BP: 147/81 (2021 10:00) (90/54 - 171/91)  BP(mean): 109 (2021 10:00) (66 - 122)  RR: 21 (2021 10:00) (12 - 24)  SpO2: 95% (2021 10:00) (92% - 98%)      I&O's Summary    2021 07:01  -  2021 07:00  --------------------------------------------------------  IN: 2160 mL / OUT: 2551 mL / NET: -391 mL    2021 07:01  -  2021 10:42  --------------------------------------------------------  IN: 100 mL / OUT: 500 mL / NET: -400 mL      Daily     Daily Weight in k.6 (2021 01:34)    PHYSICAL EXAM:       General: Lethargic        HEENT: neck brace        CVS: RRR, nl S1, S2, no murmurs, gallops, or regurg       Pulm: Lungs CTA b/l, no crackles, no wheezing, no rhonchi        GI: Nl BS, soft       : No CVA tenderness       Ext: + Peripheral pulses, no edema       Neuro: AOx3, no focal deficits       TELEMETRY: SR 70    EKG:     IMAGING:    LABS:                          8.8    9.14  )-----------( 97       ( 2021 01:54 )             25.3         135  |  104  |  10  ----------------------------<  123<H>  4.0   |  23  |  0.40<L>    Ca    7.5<L>      2021 01:54  Phos  1.8       Mg     2.1         TPro  5.0<L>  /  Alb  2.8<L>  /  TBili  0.8  /  DBili  x   /  AST  20  /  ALT  24  /  AlkPhos  58  23    LIVER FUNCTIONS - ( 2021 01:54 )  Alb: 2.8 g/dL / Pro: 5.0 g/dL / ALK PHOS: 58 U/L / ALT: 24 U/L / AST: 20 U/L / GGT: x           PT/INR - ( 2021 01:54 )   PT: 12.3 sec;   INR: 1.03 ratio         PTT - ( 2021 01:54 )  PTT:26.8 sec  Creatine Kinase, Serum: 232 U/L ( @ 13:44)  CKMB Units: 1.5 ng/mL ( @ 13:44)    Urinalysis Basic - ( 2021 23:01 )    Color: Light Yellow / Appearance: Clear / S.019 / pH: x  Gluc: x / Ketone: Negative  / Bili: Negative / Urobili: Negative   Blood: x / Protein: Negative / Nitrite: Negative   Leuk Esterase: Negative / RBC: x / WBC x   Sq Epi: x / Non Sq Epi: x / Bacteria: x

## 2021-04-23 NOTE — PROGRESS NOTE ADULT - ASSESSMENT
65y Female with h/o HTN, breast CA s/p L mastectomy, hypothyroidism admitted as level II trauma s/p fall down stairs with several fractures and SAH. 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

## 2021-04-23 NOTE — PROGRESS NOTE ADULT - ASSESSMENT
65F hx breast cancer s/p mastectomy, HTN, hypothryoidism, not on full anticoagulation/antiplatelets per EMS, presenting as a level 2 trauma activation after a fall from a flight of stairs found to have (1) SAH, (2) left-sided rib fractures, (3) left iliac wing fracture with hematoma, (4) left forehead laceration s/p successful closed reduction of left pinky PIP dislocation.      Plan:   - Neuro checks q 4hr  - Keppra 500 q 12hrs x 7 days  - Pain control with standing Tylenol, Lidoderm patch and Dilaudid prn. And added oxycodone 5mg q4hrs  - Incidental R renal lesion to be discussed  - Will require C-collar clearance (currently with distracting injury)  - Tertiary exam  - Will obtain home medications from son  - Resume DVT ppx   - will require 6 weeks of prophylactic Lovenox per ortho attending consultation note  - PT evaluation after full evaluation    [phone]

## 2021-04-23 NOTE — DISCHARGE NOTE PROVIDER - CARE PROVIDER_API CALL
Silvina Herrera  INTERNAL MEDICINE  [address] [place] [address]  Phone: [phone]  Fax: [phone]  Follow Up Time: 1 week   Silvina Herrera  INTERNAL MEDICINE  [address] [place] [address]  Phone: [phone]  Fax: [phone]  Follow Up Time: 1 week    Vikas Aquino)  Neurosurgery  [address] [facility], [address] [place] [address]  Phone: [phone]  Fax: [phone]  Follow Up Time: 2 weeks    Nell Choe)  Plastic Surgery  [address] [place] [address], [facility] [address]  [place], [place] [id number]  Phone: [phone]  Fax: [phone]  Follow Up Time: 1 week   Silvina Herrera  INTERNAL MEDICINE  [address] [place] [address]  Phone: [phone]  Fax: [phone]  Follow Up Time: 1 week    Vikas Aquino)  Neurosurgery  [address] [facility], [address] [place] [address]  Phone: [phone]  Fax: [phone]  Follow Up Time: 2 weeks    Nell Choe)  Plastic Surgery  [address] [facility], [address]  [place], [place] [id number]  Phone: [phone]  Fax: [phone]  Follow Up Time: 1 week    Reji Cardoza)  Orthopaedic Surgery  [address] [facility], [facility] [address]  [place] [address]  Phone: [phone]  Fax: [phone]  Follow Up Time: Routine    Dino Peña)  Surgery; Surgical Critical Care  [address] [facility], [address]  [place] [address]  Phone: [phone]  Fax: [phone]  Follow Up Time: Routine   Silvina Herrera  INTERNAL MEDICINE  [address] [place] [address]  Phone: [phone]  Fax: [phone]  Follow Up Time: 1 week    Vikas Aquino)  Neurosurgery  [address] [facility], [address] [place] [address]  Phone: [phone]  Fax: [phone]  Follow Up Time: 2 weeks    Nell Choe)  Plastic Surgery  [address] [facility], [address]  [place], [place] [address]  Phone: [phone]  Fax: [phone]  Follow Up Time: 1 week    Reji Cardoza)  Orthopaedic Surgery  [address] [facility] [address]  [place] [address]  Phone: [phone]  Fax: [phone]  Follow Up Time: Routine    Dino Peña)  Surgery; Surgical Critical Care  [address] [facility], [address]  [place] [address]  Phone: [phone]  Fax: [phone]  Follow Up Time: Routine    Morgan Hernández; PhD)  Cardiac Electrophysiology; Cardiovascular Disease; Internal Medicine  [facility] - Dept of Cardiology, [address] [place] [address]  Phone: [phone]  Fax: [phone]  Follow Up Time:

## 2021-04-23 NOTE — DISCHARGE NOTE PROVIDER - NSDCCPCAREPLAN_GEN_ALL_CORE_FT
PRINCIPAL DISCHARGE DIAGNOSIS  Diagnosis: Traumatic subarachnoid hemorrhage  Assessment and Plan of Treatment: You have had a head injury. Call Dr. Aquino's office for an appointment with Neurosurgery to take place 2 weeks after discharge. Do not take any NSAIDs (such as ibuprofen, aleve, motrin, advil, naprosyn) or aspirin until you have seen Dr. Aquino and have discussed with her.      SECONDARY DISCHARGE DIAGNOSES  Diagnosis: Laceration of forehead  Assessment and Plan of Treatment: For the facial laceration (cut) as well as your left nasal bone fracture (broken nose), left orbital rim fracture (broken bone above your left eye), and left pinky PIP dislocation (injury to the tip of your left pinky finger), see Dr. Choe of Plastic Surgery. Call his office to make an appointment for next week.    Diagnosis: Traumatic pneumothorax, initial encounter  Assessment and Plan of Treatment: No follow up needed.    Diagnosis: Fracture of left iliac wing  Assessment and Plan of Treatment: You have a broken bone in your pelvis. You can bear weight as tolerated with walker for 6-8 weeks until your pain improves. If needed, you can follow up with Orthopedics. Because of this broken bone, you are at higher risk for developing blood clots in your legs. You should have 6 weeks of medication to help prevent blood clots (Lovenox).    Diagnosis: Fracture of seven ribs  Assessment and Plan of Treatment: You had broken ribs. Ribs number 3-8 on the left and rib number 7 on the right (total seven broken ribs). It could take up to 6-8 weeks for the ribs to heal completely. It is important to walk around and participate in therapy with good pain control to prevent pneumonia.    Diagnosis: Closed fracture of left clavicle  Assessment and Plan of Treatment: Use a sling for comfort. It is ok to use a walker as tolerated. No lifting more than 1-3 pounds for 6 weeks.    Diagnosis: Sternal fracture  Assessment and Plan of Treatment: You have a broken breastbone. This may take 6-8 weeks to heal. It is important to walk around and participate in therapy with good pain control to prevent pneumonia.    Diagnosis: Nasal bone fracture  Assessment and Plan of Treatment: Left. You have a broken bone in your nose. No surgery required.    Diagnosis: Fracture of orbital roof, left side, initial encounter for closed fracture  Assessment and Plan of Treatment: No surgery required.    Diagnosis: Renal lesion  Assessment and Plan of Treatment: Right side. You have a lesion on your right kidney. During your stay, your care team discussed this with your  and attempted to reach your PMD (primary doctor) about this. You should follow up with your PMD for further evaluation.    Diagnosis: SVT (supraventricular tachycardia)  Assessment and Plan of Treatment: You had short periods of an abnormal heart rhythm during your hospitalization. Your metoprolol dose was increased to help prevent further episodes and because this will also control your blood pressure, your amlodipine (Norvasc) was stopped.     PRINCIPAL DISCHARGE DIAGNOSIS  Diagnosis: Traumatic subarachnoid hemorrhage  Assessment and Plan of Treatment: You have had a head injury. Call Dr. Aquino's office for an appointment with Neurosurgery to take place 2 weeks after discharge. Do not take any NSAIDs (such as ibuprofen, aleve, motrin, advil, naprosyn) or aspirin until you have seen Dr. Aquino and have discussed with her.      SECONDARY DISCHARGE DIAGNOSES  Diagnosis: Laceration of forehead  Assessment and Plan of Treatment: For the facial laceration (cut) as well as your left nasal bone fracture (broken nose), left orbital rim fracture (broken bone above your left eye), and left pinky PIP dislocation (injury to the tip of your left pinky finger), see Dr. Choe of Plastic Surgery. Call his office to make an appointment for next week.    Diagnosis: Traumatic pneumothorax, initial encounter  Assessment and Plan of Treatment: No follow up needed.    Diagnosis: Fracture of left iliac wing  Assessment and Plan of Treatment: You have a broken bone in your pelvis. You can bear weight as tolerated with walker for 6-8 weeks until your pain improves. If needed, you can follow up with Orthopedics. Because of this broken bone, you are at higher risk for developing blood clots in your legs. You should have 6 weeks of medication to help prevent blood clots (Lovenox).    Diagnosis: Fracture of seven ribs  Assessment and Plan of Treatment: You had broken ribs. Ribs number 3-8 on the left and rib number 7 on the right (total seven broken ribs). It could take up to 6-8 weeks for the ribs to heal completely. It is important to walk around and participate in therapy with good pain control to prevent pneumonia.    Diagnosis: Closed fracture of left clavicle  Assessment and Plan of Treatment: Use a sling for comfort. It is ok to use a walker as tolerated. No lifting more than 1-3 pounds for 6 weeks.    Diagnosis: Sternal fracture  Assessment and Plan of Treatment: You have a broken breastbone. This may take 6-8 weeks to heal. It is important to walk around and participate in therapy with good pain control to prevent pneumonia.    Diagnosis: Nasal bone fracture  Assessment and Plan of Treatment: Left. You have a broken bone in your nose. No surgery required.    Diagnosis: Fracture of orbital roof, left side, initial encounter for closed fracture  Assessment and Plan of Treatment: No surgery required.    Diagnosis: Renal lesion  Assessment and Plan of Treatment: Right side. You have a lesion on your right kidney. During your stay, your care team discussed this with your  and attempted to reach your PMD (primary doctor) about this. You should follow up with your PMD for further evaluation.    Diagnosis: SVT (supraventricular tachycardia)  Assessment and Plan of Treatment: You had short periods of an abnormal heart rhythm during your hospitalization. Your metoprolol dose was increased to help prevent further episodes and because this will also control your blood pressure, your amlodipine (Norvasc) was stopped. Can follow up in 3 months with EP Dr. Hernández [phone]

## 2021-04-23 NOTE — PROGRESS NOTE ADULT - SUBJECTIVE AND OBJECTIVE BOX
ACS DAILY PROGRESS NOTE:       SUBJECTIVE/ROS: Patient seen and examined on rounds- facial sutures removed    24 HOUR EVENTS:  - Pt went into SVT which broke after 6mg adenosine IVP and 5mg metoprolol IVP x2 doses. Metoprolol PO increased from 25mg q12hrs to 25mg q6hrs. Cardiology consulted  - Added salt tabs 1g q8hrs and started on NS @ 50cc/hr for hyponatremia  - Spangler discontinued, passed TOV  - Added lovenox       MEDICATIONS  (STANDING):  chlorhexidine 2% Cloths 1 Application(s) Topical <User Schedule>  enoxaparin Injectable 40 milliGRAM(s) SubCutaneous every 24 hours  escitalopram 10 milliGRAM(s) Oral daily  levETIRAcetam  IVPB 500 milliGRAM(s) IV Intermittent every 12 hours  levothyroxine 112 MICROGram(s) Oral daily  lidocaine   Patch 1 Patch Transdermal daily  metoprolol tartrate 25 milliGRAM(s) Oral four times a day  sodium chloride 1 Gram(s) Oral every 8 hours  sodium chloride 0.9%. 1000 milliLiter(s) (50 mL/Hr) IV Continuous <Continuous>    MEDICATIONS  (PRN):  acetaminophen  IVPB .. 1000 milliGRAM(s) IV Intermittent every 6 hours PRN Mild Pain (1 - 3)  oxyCODONE    IR 5 milliGRAM(s) Oral every 4 hours PRN Moderate Pain (4 - 6)      OBJECTIVE:    Vital Signs Last 24 Hrs  T(C): 36.8 (2021 07:00), Max: 37.6 (2021 11:00)  T(F): 98.2 (2021 07:00), Max: 99.7 (2021 11:00)  HR: 62 (2021 08:00) (54 - 167)  BP: 143/85 (2021 08:00) (90/54 - 171/91)  BP(mean): 108 (2021 08:00) (66 - 122)  RR: 18 (2021 08:00) (12 - 24)  SpO2: 97% (2021 08:00) (92% - 98%)        I&O's Detail    2021 07:01  -  2021 07:00  --------------------------------------------------------  IN:    IV PiggyBack: 800 mL    IV PiggyBack: 250 mL    Oral Fluid: 60 mL    sodium chloride 0.9%: 1050 mL  Total IN: 2160 mL    OUT:    Indwelling Catheter - Urethral (mL): 700 mL    Intermittent Catheterization - Urethral (mL): 850 mL    Voided (mL): 1001 mL  Total OUT: 2551 mL    Total NET: -391 mL      2021 07:01  -  2021 09:06  --------------------------------------------------------  IN:    sodium chloride 0.9%: 50 mL  Total IN: 50 mL    OUT:  Total OUT: 0 mL    Total NET: 50 mL          Daily     Daily Weight in k.6 (2021 01:34)    LABS:                        8.8    9.14  )-----------( 97       ( 2021 01:54 )             25.3     04-23    135  |  104  |  10  ----------------------------<  123<H>  4.0   |  23  |  0.40<L>    Ca    7.5<L>      2021 01:54  Phos  1.8     04-  Mg     2.1     -23    TPro  5.0<L>  /  Alb  2.8<L>  /  TBili  0.8  /  DBili  x   /  AST  20  /  ALT  24  /  AlkPhos  58  04-23    PT/INR - ( 2021 01:54 )   PT: 12.3 sec;   INR: 1.03 ratio         PTT - ( 2021 01:54 )  PTT:26.8 sec  Urinalysis Basic - ( 2021 23:01 )    Color: Light Yellow / Appearance: Clear / S.019 / pH: x  Gluc: x / Ketone: Negative  / Bili: Negative / Urobili: Negative   Blood: x / Protein: Negative / Nitrite: Negative   Leuk Esterase: Negative / RBC: x / WBC x   Sq Epi: x / Non Sq Epi: x / Bacteria: x                Physical Exam  General Appearance: Resting comfortably, no acute distress  Chest: non-labored breathing, no respiratory distress  CV: Pulse regular presently  Abdomen: Soft, non-tender, non-distended, no peritonitis   Incision: C/I/D  Extremities: warm and well perfused

## 2021-04-23 NOTE — DISCHARGE NOTE PROVIDER - NSDCFUADDINST_GEN_ALL_CORE_FT
DVT ppx for 6 weeks recommended due to fractures and likely immobilization  DVT ppx for 6 weeks recommended due to fractures and likely immobilization.    NOTIFY YOUR SURGEON IF: You have any bleeding that does not stop, any pus draining from your wound, any fever (over 100.4 F) or chills, persistent nausea/vomiting, persistent diarrhea, or if your pain is not controlled on your discharge pain medications.

## 2021-04-23 NOTE — DISCHARGE NOTE PROVIDER - CARE PROVIDERS DIRECT ADDRESSES
,DirectAddress_Unknown ,DirectAddress_Unknown,[email or web address],DirectAddress_Unknown ,DirectAddress_Unknown,[email or web address],DirectAddress_Unknown,DirectAddress_Unknown,[email or web address] ,DirectAddress_Unknown,[email or web address],DirectAddress_Unknown,DirectAddress_Unknown,[email or web address],DirectAddress_Unknown

## 2021-04-23 NOTE — DISCHARGE NOTE PROVIDER - PROVIDER TOKENS
PROVIDER:[TOKEN:[[id number]],FOLLOWUP:[1 week]] PROVIDER:[TOKEN:[[id number]],FOLLOWUP:[1 week]],PROVIDER:[[id number]],FOLLOWUP:[2 weeks]],PROVIDER:[TOKEN:[[id number]],FOLLOWUP:[1 week]] PROVIDER:[TOKEN:[[id number]],FOLLOWUP:[1 week]],PROVIDER:[[id number]],FOLLOWUP:[2 weeks]],PROVIDER:[TOKEN:[[id number]],FOLLOWUP:[1 week]],PROVIDER:[TOKEN:[[id number]],FOLLOWUP:[Routine]],PROVIDER:[TOKEN:[[id number]],FOLLOWUP:[Routine]] PROVIDER:[TOKEN:[[id number]],FOLLOWUP:[1 week]],PROVIDER:[TOKEN:[[id number]],FOLLOWUP:[2 weeks]],PROVIDER:[TOKEN:[[id number]],FOLLOWUP:[1 week]],PROVIDER:[TOKEN:[[id number]],FOLLOWUP:[Routine]],PROVIDER:[TOKEN:[[id number]],FOLLOWUP:[Routine]],PROVIDER:[TOKEN:[[id number]]]

## 2021-04-23 NOTE — PROGRESS NOTE ADULT - ATTENDING COMMENTS
65y Female with h/o HTN, breast CA s/p L mastectomy , hypothyroidism admitted as level II trauma s/p fall down stairs.  As per son, patient is able to care for herself at baseline. Became more lethargic in ER started on IVF.  Secondary survey positive for L scalp laceration, L chest wall tenderness, and L pelvic tenderness.    Imaging revealed the following injuries: L nasal bone fracture; L lateral orbit wall fracture; small scattered B/L SAHs; minimally displaced L clavicular fracture; minimally displaced sternal fracture; L 3-8 rib fractures; small L pneumothorax; R 7th rib fracture; likely chronic 6th and 8th rib fracture; acute displaced, comminuted fracture of the L iliac wing, and a R renal lesion.  Patient also found to have large hematoma near the iliac wing fracture.      Dev SVT, responded to Adenosine, start Lopressor  Replace K and PO4  Pain control,   f/U CT head  stable,  Dc Keppra sec to developing thrombocytopenia, pt neurologically unchanged  F/U CXR no ptx  Drop in HCT to 20.9 for transfusion, continue to monitor  PO  LA decreasing   Lt pinki fx reduced, no intervention for nasal bone fx  Hold pharmacologic DVT ppx  PT  Spoke to her 
65y Female with h/o HTN, breast CA s/p L mastectomy , hypothyroidism admitted as level II trauma s/p fall down stairs.  As per son, patient is able to care for herself at baseline. Became more lethargic in ER started on IVF.  Secondary survey positive for L scalp laceration, L chest wall tenderness, and L pelvic tenderness.    Imaging revealed the following injuries: L nasal bone fracture; L lateral orbit wall fracture; small scattered B/L SAHs; minimally displaced L clavicular fracture; minimally displaced sternal fracture; L 3-8 rib fractures; small L pneumothorax; R 7th rib fracture; likely chronic 6th and 8th rib fracture; acute displaced, comminuted fracture of the L iliac wing, and a R renal lesion.  Patient also found to have large hematoma near the iliac wing fracture.      Pain control,   f/U CT head last night stable, for repeat CT today, keppra for seizure ppx, pt neurologically unchanged  F/U CXR no ptx  HCT stable after initial drop  Start PO  LA decreasing   Lt pinki fx reduced, no intervention for nasal bone fx  Possible pharm DVT ppx from AM DVT ppx  PT
Patient seen and examined and agree with above.   s/p fall with resultin) SAH, (2) left-sided rib fractures, (3) left iliac wing fracture with hematoma, (4) left forehead laceration and s/p successful closed reduction of left pinky PIP dislocation.  Patient's mental status remains lethargic. She does awaken slightly to command but does not follow commands.  hemodynamically stable.  Pulmonary status is stable.   H/H stable.  Will continue to monitor and follow up repeat Head Cts.
65y Female with h/o HTN, breast CA s/p L mastectomy , hypothyroidism admitted as level II trauma s/p fall down stairs.  As per son, patient is able to care for herself at baseline. Became more lethargic in ER started on IVF.  Secondary survey positive for L scalp laceration, L chest wall tenderness, and L pelvic tenderness.    Imaging revealed the following injuries: L nasal bone fracture; L lateral orbit wall fracture; small scattered B/L SAHs; minimally displaced L clavicular fracture; minimally displaced sternal fracture; L 3-8 rib fractures; small L pneumothorax; R 7th rib fracture; likely chronic 6th and 8th rib fracture; acute displaced, comminuted fracture of the L iliac wing, and a R renal lesion.  Patient also found to have large hematoma near the iliac wing fracture.      HR has been controlled, dose of lopressor adjusted  f/U CT head  resolving head bleed  thrombocytopenia stable  F/U CXR no ptx  HCT stable after transfusion  PO  LA decreasing   On pharmacologic DVT ppx, surveillance duplex of LE neg neg for DVT  PT  Spoke to her  again  Acute TBI rehab plan  Transfer to tele
66y/o woman s/p fall down stairs with multiple injuries, including ICH.    1. Traumatic B/L SAH:  - First head CT 4/19 1124  - Repeat head CT 4/19 1721 (stable, six hours after first CT)  - Second repeat head CT 4/20 1122: Stable (24 hours after initial CT)  - Ok to start chemical VTE px today after 12pm, d/w Dr. Arpit Nava for 7 days for post-traumatic seizure prophylaxis  - No NSAIDs  - F/u with  2 weeks after discharge for repeat imaging  - This morning, pt was more somnolent. D/w SICU team, who re-examined patient and found her to be more awake. If mental status changes, obtain repeat head CT.    2. Left iliac wing and L clavicle fractures:  - Ortho recommends LLE WBAT with walker, NWB LUE with sling, encourage ROM elbow/wrist/hand  - PT/OT  - No surgical intervention recommended  - Lovenox prophylaxis for 6 weeks per Ortho    3. L 3-8 rib fractures, R 7th rib fracture, CT with small L PTX on presentation  - CXR 4/21 without PTX/MARINA  - Multimodal pain control with tylenol, oxy prn, lidocaine patch  - NO NSAIDs due to ICH    4. L PIP closed reduction  - F/u with Dr. Choe next week as outpatient    5. Incidental finding: R renal lesion  - Appreciate SICU PA note 4/20 regarding discussion with pt's  and attempt to reach her Oncologist Dr. Herrera, will follow up.
66y/o woman s/p fall down stairs with multiple injuries, including ICH.    1. Traumatic B/L SAH:  - First head CT 4/19 1124  - Repeat head CT 4/19 1721 (stable, six hours after first CT)  - Second repeat head CT 4/20 1122: Stable (24 hours after initial CT)  - Ok to start chemical VTE px today after 12pm, d/w Dr. Arpit Nava for 7 days for post-traumatic seizure prophylaxis  - No NSAIDs  - F/u with  2 weeks after discharge for repeat imaging    2. Left iliac wing and L clavicle fractures:  - Ortho recommends LLE WBAT with walker, NWB LUE with sling, encourage ROM elbow/wrist/hand  - PT/OT  - No surgical intervention recommended    3. L 3-8 rib fractures, R 7th rib fracture, CT with small L PTX on presentation  - CXR 4/21 without PTX/MARINA  - Multimodal pain control with tylenol, oxy prn, lidocaine patch  - NO NSAIDs due to ICH    4. L PIP closed reduction  - F/u with PRS next week as outpatient    5. Incidental finding: R renal lesion  - Appreciate SICU PA note 4/20 regarding discussion with pt's  and attempt to reach her Oncologist Dr. Herrera, will follow up.
65y Female with h/o HTN, breast CA s/p L mastectomy , hypothyroidism admitted as level II trauma s/p fall down stairs.  As per son, patient is able to care for herself at baseline. Became more lethargic in ER started on IVF.  Secondary survey positive for L scalp laceration, L chest wall tenderness, and L pelvic tenderness.    Imaging revealed the following injuries: L nasal bone fracture; L lateral orbit wall fracture; small scattered B/L SAHs; minimally displaced L clavicular fracture; minimally displaced sternal fracture; L 3-8 rib fractures; small L pneumothorax; R 7th rib fracture; likely chronic 6th and 8th rib fracture; acute displaced, comminuted fracture of the L iliac wing, and a R renal lesion.  Patient also found to have large hematoma near the iliac wing fracture.      Dev tachycardia again,, responded to Adenosine, will increase frequency of Lopressor  ECHO, cardiology consult  Replace PO4 again  Pain control,   f/U CT head  stable,  off Keppra sec to developing thrombocytopenia better today  F/U CXR no ptx  Drop in HCT to 20.9 s/p transfusion with appropriate response, continue to monitor  PO  LA decreasing   Lt pinki fx reduced, no intervention for nasal bone fx  Start pharmacologic DVT ppx, surveillance duplex of LE  PT  Spoke to her

## 2021-04-23 NOTE — DISCHARGE NOTE PROVIDER - NSDCMRMEDTOKEN_GEN_ALL_CORE_FT
amLODIPine 2.5 mg oral tablet: 1 tab(s) orally once a day  Calcium 500+D oral tablet, chewable: 1 tab(s) orally 2 times a day  Synthroid 112 mcg (0.112 mg) oral tablet: 1 tab(s) orally once a day  tamoxifen 20 mg oral tablet: 1 tab(s) orally once a day   amLODIPine 2.5 mg oral tablet: 1 tab(s) orally once a day  Calcium 500+D oral tablet, chewable: 1 tab(s) orally 2 times a day  enoxaparin: 40 milligram(s) subcutaneous every 24 hours for 6 weeks  Synthroid 112 mcg (0.112 mg) oral tablet: 1 tab(s) orally once a day  tamoxifen 20 mg oral tablet: 1 tab(s) orally once a day   acetaminophen 10 mg/mL intravenous solution: 100 milliliter(s) intravenous every 6 hours, As needed, Mild Pain (1 - 3)  Calcium 500+D oral tablet, chewable: 1 tab(s) orally 2 times a day  enoxaparin: 40 milligram(s) subcutaneous every 24 hours for 6 weeks  levETIRAcetam 500 mg oral tablet: 1 tab(s) orally 2 times a day  lidocaine 5% topical film: Apply topically to affected area once a day   metoprolol tartrate 50 mg oral tablet: 1 tab(s) orally every 12 hours  oxyCODONE 5 mg oral tablet: 1 tab(s) orally every 6 hours, As Needed -Moderate Pain (4 - 6) MDD:4 tabs  Synthroid 112 mcg (0.112 mg) oral tablet: 1 tab(s) orally once a day  tamoxifen 20 mg oral tablet: 1 tab(s) orally once a day   acetaminophen 500 mg oral tablet: 1 tab(s) orally every 6 hours, As Needed -for mild pain   Calcium 500+D oral tablet, chewable: 1 tab(s) orally 2 times a day  enoxaparin: 40 milligram(s) subcutaneous every 24 hours for 6 weeks  levETIRAcetam 500 mg oral tablet: 1 tab(s) orally 2 times a day for one day (to complete seven day course for post-traumatic seizure prophylaxis).  lidocaine 5% topical film: Apply topically to affected area once a day   metoprolol tartrate 50 mg oral tablet: 1 tab(s) orally every 12 hours  oxyCODONE 5 mg oral tablet: 1 tab(s) orally every 6 hours, As Needed -Moderate Pain (4 - 6) MDD:4 tabs  Synthroid 112 mcg (0.112 mg) oral tablet: 1 tab(s) orally once a day  tamoxifen 20 mg oral tablet: 1 tab(s) orally once a day   acetaminophen 500 mg oral tablet: 1 tab(s) orally every 6 hours, As Needed -for mild pain   Calcium 500+D oral tablet, chewable: 1 tab(s) orally 2 times a day  enoxaparin: 40 milligram(s) subcutaneous every 24 hours for 6 weeks  lidocaine 5% topical film: Apply topically to affected area once a day   metoprolol tartrate 50 mg oral tablet: 1 tab(s) orally every 12 hours  oxyCODONE 5 mg oral tablet: 1 tab(s) orally every 6 hours, As Needed -Moderate Pain (4 - 6) MDD:4 tabs  Synthroid 112 mcg (0.112 mg) oral tablet: 1 tab(s) orally once a day  tamoxifen 20 mg oral tablet: 1 tab(s) orally once a day   acetaminophen 500 mg oral tablet: 1 tab(s) orally every 6 hours, As Needed -for mild pain   Calcium 500+D oral tablet, chewable: 1 tab(s) orally 2 times a day  enoxaparin: 40 milligram(s) subcutaneous every 24 hours for 6 weeks  escitalopram 10 mg oral tablet: 1 tab(s) orally once a day  lidocaine 5% topical film: Apply topically to affected area once a day   metoprolol succinate 100 mg oral capsule, extended release: 1 cap(s) orally once a day   oxyCODONE 5 mg oral tablet: 1 tab(s) orally every 6 hours, As Needed -Moderate Pain (4 - 6) MDD:4 tabs  Synthroid 112 mcg (0.112 mg) oral tablet: 1 tab(s) orally once a day  tamoxifen 20 mg oral tablet: 1 tab(s) orally once a day   acetaminophen 500 mg oral tablet: 1 tab(s) orally every 6 hours, As Needed -for mild pain   Calcium 500+D oral tablet, chewable: 1 tab(s) orally 2 times a day  enoxaparin: 40 milligram(s) subcutaneous every 24 hours for 6 weeks  escitalopram 10 mg oral tablet: 1 tab(s) orally once a day  lidocaine 5% topical film: Apply topically to affected area once a day   oxyCODONE 5 mg oral tablet: 1 tab(s) orally every 6 hours, As Needed -Moderate Pain (4 - 6) MDD:4 tabs  Synthroid 112 mcg (0.112 mg) oral tablet: 1 tab(s) orally once a day  tamoxifen 20 mg oral tablet: 1 tab(s) orally once a day  Toprol- mg oral tablet, extended release: 1 tab(s) orally once a day

## 2021-04-23 NOTE — CHART NOTE - NSCHARTNOTEFT_GEN_A_CORE
Cervical spine CT reviewed which demonstrated no acute fracture or traumatic subluxation. Pt denies any neck pain, and there is no midline cervical spine tenderness upon palpation or with full ROM. Cervical collar cleared by confrontational exam and removed by Dr. Anguiano. SICU attending at bedside.      -Kari Murdock PA-C  #[id number]
Repeat 6h CTH stable, no indication for further acute neurosurgical intervention as inpatient at this time.    Pt should follow up with Dr. Aquino as outpatient in 2 weeks for stability imaging    Continue q4h neuro checks, repeat CTH in any change in exam, hold DVT ppx until 24H AFTER a stable 12-24h CTH.
SICU PA Note    Patient had incidental finding of R renal midpole lesion and left midpole complex cyst on CTAP.  Patient has had decreased mentation secondary to ICH.  Patient's  at bedside, and he was informed of incidental findings.  He stated that they were due for follow up after discharge with her oncologist, Dr. Silvina Herrera.  I called her office at [phone] but was unable to leave an answer.  Will attempt to reach her again tomorrow.  In the meantime, we will order a renal sono to further evaluate these lesions while still admitted.     Jacqueline Stapleton, PA-C #[id number]
SICU Transfer Note    Transfer from: SICU  Transfer to:   Accepting physician:    SICU COURSE:  4/19: passed hemorrhage watch, stable repeat CTH, poor mental status  4/20: CTH stable, added home lexapro, lactate cleared  4/21: transfused 1U pRBC, adenosine x 1 for SVT, started Metoprolol, added Oxy 5, IVL  4/22: broke SVT w/ 6mg Adenosine and 5mg Metoprolol x 2, Cardiology c/s, salt tabs added, avila d/c and passed TOV, LNX started    ASSESSMENT & PLAN:   65F hx breast cancer s/p mastectomy, HTN, hypothryoidism, not on full anticoagulation/antiplatelets per EMS, presenting as a level 2 trauma activation after a fall from a flight of stairs found to have (1) SAH, (2) left-sided rib fractures, (3) left iliac wing fracture with hematoma, (4) left forehead laceration s/p successful closed reduction of left pinky PIP dislocation.      Plan:   - Neuro checks q 4hr  - Keppra 500 q 12hrs x 7 days  - Pain control with standing Tylenol, Lidoderm patch and Dilaudid prn. And added oxycodone 5mg q4hrs  - Incidental R renal lesion to be discussed  - C-collar cleared 4/23  - Tertiary exam  - Will obtain home medications from son  - Resume DVT ppx   - will require 6 weeks of prophylactic Lovenox per ortho attending consultation note  - PT evaluation after full evaluation    Vital Signs Last 24 Hrs  T(C): 36.8 (23 Apr 2021 20:01), Max: 37.3 (23 Apr 2021 03:00)  T(F): 98.3 (23 Apr 2021 20:01), Max: 99.1 (23 Apr 2021 03:00)  HR: 80 (23 Apr 2021 20:01) (62 - 94)  BP: 139/83 (23 Apr 2021 20:01) (106/68 - 171/91)  BP(mean): 94 (23 Apr 2021 19:00) (82 - 122)  RR: 21 (23 Apr 2021 20:01) (14 - 24)  SpO2: 95% (23 Apr 2021 20:01) (91% - 97%)  I&O's Summary    22 Apr 2021 07:01 - 23 Apr 2021 07:00  --------------------------------------------------------  IN: 2160 mL / OUT: 2551 mL / NET: -391 mL    23 Apr 2021 07:01 - 23 Apr 2021 23:19  --------------------------------------------------------  IN: 150 mL / OUT: 1100 mL / NET: -950 mL      MEDICATIONS  (STANDING):  chlorhexidine 2% Cloths 1 Application(s) Topical <User Schedule>  enoxaparin Injectable 40 milliGRAM(s) SubCutaneous every 24 hours  escitalopram 10 milliGRAM(s) Oral daily  levETIRAcetam 500 milliGRAM(s) Oral two times a day  levothyroxine 112 MICROGram(s) Oral daily  lidocaine   Patch 1 Patch Transdermal daily  metoprolol tartrate 50 milliGRAM(s) Oral every 12 hours  senna 2 Tablet(s) Oral at bedtime  sodium chloride 1 Gram(s) Oral every 8 hours    MEDICATIONS  (PRN):  acetaminophen  IVPB .. 1000 milliGRAM(s) IV Intermittent every 6 hours PRN Mild Pain (1 - 3)  oxyCODONE    IR 5 milliGRAM(s) Oral every 4 hours PRN Moderate Pain (4 - 6)        LABS                                            8.8                   Neurophils% (auto):   x      (04-23 @ 01:54):    9.14 )-----------(97           Lymphocytes% (auto):  x                                             25.3                   Eosinphils% (auto):   x        Manual%: Neutrophils x    ; Lymphocytes x    ; Eosinophils x    ; Bands%: x    ; Blasts x                                    139    |  102    |  7                   Calcium: 7.8   / iCa: x      (04-23 @ 15:43)    ----------------------------<  143       Magnesium: 2.0                              3.8     |  23     |  <0.30            Phosphorous: 1.9      TPro  5.0    /  Alb  2.8    /  TBili  0.8    /  DBili  x      /  AST  20     /  ALT  24     /  AlkPhos  58     23 Apr 2021 01:54    ( 04-23 @ 01:54 )   PT: 12.3 sec;   INR: 1.03 ratio  aPTT: 26.8 sec
Hemorrhage Watch Note    Patient admitted to SICU s/p fall as level II trauma.  Patient found to have L iliac wing fracture with surrounding hematoma and was placed on hemorrhage watch upon arrival to SICU.  Hct 41.2 > 33.8 > 35.9 > 34.6.  Lactate 3.7 > 3.7 > 3.0.  Low suspicion that patient is still actively bleeding.  Patient received total of 2L bolus of NS during hemorrhage watch and likely still requires some resuscitation.  Case discussed with Dr. Munson and Dr. Griggs.  Will continue to trend CBC and lactate q 4hrs for now.    Jacqueline Stapleton PA-C #[id number]

## 2021-04-23 NOTE — DISCHARGE NOTE PROVIDER - HOSPITAL COURSE
5F hx breast cancer s/p mastectomy, HTN, hypothryoidism, not on full anticoagulation/antiplatelets per EMS, presenting as a level 2 trauma activation after a fall from a flight of stairs. Patient only able to say "ow" in trauma bay, unable to provide further history. However per son, normal mentation at baseline.     In the trauma bay, airway was intact with bilateral breath sounds, O2 saturation 100% on room air. Initially blood pressure was 140s systolic however on repeat 90s. NS@100 initiated. Secondary significant for left forehead laceration, left chest wall tenderness, left pelvic tenderness.       (1) SAH, (2) left-sided rib fractures, (3) left iliac wing fracture with hematoma, (4) left forehead laceration s/p successful closed reduction of left pinky PIP dislocation.     65F hx breast cancer s/p mastectomy, HTN, hypothyroidism, not on anticoagulation/antiplatelet, presented as a level 2 trauma activation after a fall down a flight of stairs. Patient only able to say "ow" in trauma bay, unable to provide further history. However per son, normal mentation at baseline.     Ms. Arthur was hypotensive in the trauma bay and was transferred to the Surgical ICU after imaging was obtained. Her injury list is below.    (1) SAH, bilateral: stable on repeat imaging. Seven day course of Keppra for post-traumatic seizure prophylaxis.    (2) left 3-8 and right 7th rib fractures with occult left pneumothorax: pain control    (3) left iliac wing fracture with hematoma: required transfusion early in her ICU course, after which CBC were stable     (4) left forehead laceration s/p suture repair    (5) s/p successful closed reduction of left pinky PIP dislocation with delfin splint    (6) left clavicle fracture    (7) left nasal bone and left orbital rim fractures    (8) sternal fracture    Incidental findings: right renal lesion. Pt's  was notified and copy of the report given to him. Attempts to reach patient's PMD not successful.     Hospital events:    (1) SVT: Pt had two brief episodes of SVT, one of which required adenosine. She was started on metoprolol and her home norvasc was discontinued.    4/19- 65F hx breast cancer s/p mastectomy, HTN, hypothryoidism, not on full anticoagulation/antiplatelets per EMS, presenting as a level 2 trauma activation after a fall from a flight of stairs. Patient only able to say "ow" in trauma bay, unable to provide further history. However per son, normal mentation at baseline.   In the trauma bay, airway was intact with bilateral breath sounds, O2 saturation 100% on room air. Initially blood pressure was 140s systolic however on repeat 90s. NS@100 initiated. Secondary significant for left forehead laceration, left chest wall tenderness, left pelvic tenderness.   Ms. Arthur was hypotensive in the trauma bay and was transferred to the Surgical ICU after imaging was obtained. Her injury list is below.  (1) SAH, bilateral: stable on repeat imaging. Seven day course of Keppra for post-traumatic seizure prophylaxis.  (2) left 3-8 and right 7th rib fractures with occult left pneumothorax: pain control  (3) left iliac wing fracture with hematoma: required transfusion early in her ICU course, after which CBC were stable  (4) left forehead laceration s/p suture repair  (5) s/p successful closed reduction of left pinky PIP dislocation with delfin splint  (6) left clavicle fracture  (7) left nasal bone and left orbital rim fractures  (8) sternal fracture  Incidental findings: right renal lesion. Pt's  was notified and copy of the report given to him.     The patient was admitted to Trauma Surgery under SICU care.  Ortho was consulted for L. iliac wing fx, sternal fx, L. clavicle fx- recommended WBAT of the affected left lower extremity with walker, WBAT L shoulder, pendulum exercises okay, no lifting, sling for comfort, Encourage ROM of elbow/wrist/hand, PT/OT consult, NO urgent orthopedic surgical intervention at this time.    Neurosurgery consulted- recommended repeat CTH    PRS was consulted for non displaced left superior orbital rim and non displaced left nasal bone fracture--> No indication for operative fixation of these non displaced fractures; recommend ice to face to aid in swelling and analgesia.    She was placed on hemorrhage watch in the SICU given pelvic fracture and surrounding hematoma.  Behavior health was consulted for delirium.      4/20- Completed hemorrhage watch  - Stable repeat head CT  - CXR with unchanged pneumothorax  - Poor mental status, AxOx1 to person only     Hand was consulted for L. pinky PIP dislocation- s/p closed reduction, rec Cont delfin taping for now.         4/19- 65F hx breast cancer s/p mastectomy, HTN, hypothryoidism, not on full anticoagulation/antiplatelets per EMS, presenting as a level 2 trauma activation after a fall from a flight of stairs. Patient only able to say "ow" in trauma bay, unable to provide further history. However per son, normal mentation at baseline.   In the trauma bay, airway was intact with bilateral breath sounds, O2 saturation 100% on room air. Initially blood pressure was 140s systolic however on repeat 90s. NS@100 initiated. Secondary significant for left forehead laceration, left chest wall tenderness, left pelvic tenderness.     Ms. Arthur was hypotensive in the trauma bay and was transferred to the Surgical ICU after imaging was obtained. Her injury list is below.  (1) SAH, bilateral: stable on repeat imaging. Seven day course of Keppra for post-traumatic seizure prophylaxis.  (2) left 3-8 and right 7th rib fractures with occult left pneumothorax: pain control  (3) left iliac wing fracture with hematoma: required transfusion early in her ICU course, after which CBC were stable  (4) left forehead laceration s/p suture repair  (5) s/p successful closed reduction of left pinky PIP dislocation with delfin splint  (6) left clavicle fracture  (7) left nasal bone and left orbital rim fractures  (8) sternal fracture  Incidental findings: right renal lesion. Pt's  was notified and copy of the report given to him.     The patient was admitted to Trauma Surgery under SICU care.  Ortho was consulted for L. iliac wing fx, sternal fx, L. clavicle fx- recommended WBAT of the affected left lower extremity with walker, WBAT L shoulder, pendulum exercises okay, no lifting, sling for comfort, Encourage ROM of elbow/wrist/hand, PT/OT consult, NO urgent orthopedic surgical intervention at this time.    Neurosurgery consulted- recommended repeat CTH    PRS was consulted for non displaced left superior orbital rim and non displaced left nasal bone fracture--> No indication for operative fixation of these non displaced fractures; recommend ice to face to aid in swelling and analgesia.    She was placed on hemorrhage watch in the SICU given pelvic fracture and surrounding hematoma.  Behavior health was consulted for delirium.    4/20- Completed hemorrhage watch  - Stable repeat head CT  - CXR with unchanged pneumothorax  - Poor mental status, AxOx1 to person only     Hand was consulted for L. pinky PIP dislocation- s/p closed reduction, rec Cont delfin taping for now.    4/21- CTH stable  - Added home lexapro  - Added NS @ 50cc/hr to supplement poor PO intake  - Lactate cleared (1.9)    Patient was evaluated by PT/OT and recommended ACUTE rehab.  PMNR was consulted and recommended acute TBI rehab.    4/22- Transfused 1u pRBC, Hct 20.9 -> 26.9  - Went into SVT, given adenosine x1 and started on metoprolol 25mg q12hr  - Added oxycodone 5mg q4hrs prn for increased pain control  - PO intake improved, IVL    4/23- EP was consulted for SVT, recc continue lopressor 25mg   Metoprolol PO increased from 25mg q12hrs to 25mg q6hrs.   - Added salt tabs 1g q8hrs and started on NS @ 50cc/hr for hyponatremia  - Spangler discontinued, passed TOV  - Added lovenox  - Cervical collar cleared by confrontational exam     4/24- Patient transferred from SICU to surgical floor in stable condition.  Discharge planning to acute rehab.     Stable from neurologic perspective consistent with mild TBI  Multiple rib fractures - breathing comfortable with multimodal pain control    On day of discharge, the patient was tolerating diet, working with PT well and pain controlled. The patient will be discharged to acute rehab with outpatient follow up with Dr. Peña as well as consulting specialities.                     4/19- 65F hx breast cancer s/p mastectomy, HTN, hypothryoidism, not on full anticoagulation/antiplatelets per EMS, presenting as a level 2 trauma activation after a fall from a flight of stairs. Patient only able to say "ow" in trauma bay, unable to provide further history. However per son, normal mentation at baseline.   In the trauma bay, airway was intact with bilateral breath sounds, O2 saturation 100% on room air. Initially blood pressure was 140s systolic however on repeat 90s. NS@100 initiated. Secondary significant for left forehead laceration, left chest wall tenderness, left pelvic tenderness.     Ms. Arthur was hypotensive in the trauma bay and was transferred to the Surgical ICU after imaging was obtained. Her injury list is below.  (1) SAH, bilateral: stable on repeat imaging. Seven day course of Keppra for post-traumatic seizure prophylaxis.  (2) left 3-8 and right 7th rib fractures with occult left pneumothorax: pain control  (3) left iliac wing fracture with hematoma: required transfusion early in her ICU course, after which CBC were stable  (4) left forehead laceration s/p suture repair  (5) s/p successful closed reduction of left pinky PIP dislocation with delfin splint  (6) left clavicle fracture  (7) left nasal bone and left orbital rim fractures  (8) sternal fracture  Incidental findings: right renal lesion. Pt's  was notified and copy of the report given to him.     The patient was admitted to Trauma Surgery under SICU care.  Ortho was consulted for L. iliac wing fx, sternal fx, L. clavicle fx- recommended WBAT of the affected left lower extremity with walker, WBAT L shoulder, pendulum exercises okay, no lifting, sling for comfort, Encourage ROM of elbow/wrist/hand, PT/OT consult, NO urgent orthopedic surgical intervention at this time.    Neurosurgery consulted- recommended repeat CTH    PRS was consulted for non displaced left superior orbital rim and non displaced left nasal bone fracture--> No indication for operative fixation of these non displaced fractures; recommend ice to face to aid in swelling and analgesia.    She was placed on hemorrhage watch in the SICU given pelvic fracture and surrounding hematoma.  Behavior health was consulted for delirium.    4/20- Completed hemorrhage watch  - Stable repeat head CT  - CXR with unchanged pneumothorax  - Poor mental status, AxOx1 to person only     Hand was consulted for L. pinky PIP dislocation- s/p closed reduction, rec Cont delfin taping for now.    4/21- CTH stable  - Added home lexapro  - Added NS @ 50cc/hr to supplement poor PO intake  - Lactate cleared (1.9)    Patient was evaluated by PT/OT and recommended ACUTE rehab.  PMNR was consulted and recommended acute TBI rehab.    4/22- Transfused 1u pRBC, Hct 20.9 -> 26.9  - Went into SVT, given adenosine x1 and started on metoprolol 25mg q12hr  - Added oxycodone 5mg q4hrs prn for increased pain control  - PO intake improved, IVL    4/23- EP was consulted for SVT, recc continue lopressor 25mg   Metoprolol PO increased from 25mg q12hrs to 25mg q6hrs.   - Added salt tabs 1g q8hrs and started on NS @ 50cc/hr for hyponatremia  - Spangler discontinued, passed TOV  - Added lovenox  - Cervical collar cleared by confrontational exam     4/24- Patient transferred from SICU to surgical floor in stable condition.  Discharge planning to acute rehab.     Stable from neurologic perspective consistent with mild TBI  Multiple rib fractures - breathing comfortable with multimodal pain control    On day of discharge, the patient was tolerating diet, working with PT well and pain controlled. The patient will be discharged to acute rehab with instructions for outpatient follow up with Dr. Peña as well as consulting specialities.                     4/19- 65F hx breast cancer s/p mastectomy, HTN, hypothryoidism, not on full anticoagulation/antiplatelets per EMS, presenting as a level 2 trauma activation after a fall from a flight of stairs. Patient only able to say "ow" in trauma bay, unable to provide further history. However per son, normal mentation at baseline.   In the trauma bay, airway was intact with bilateral breath sounds, O2 saturation 100% on room air. Initially blood pressure was 140s systolic however on repeat 90s. NS@100 initiated. Secondary significant for left forehead laceration, left chest wall tenderness, left pelvic tenderness.     Ms. Arhtur was hypotensive in the trauma bay and was transferred to the Surgical ICU after imaging was obtained. Her injury list is below.  (1) SAH, bilateral: stable on repeat imaging. Seven day course of Keppra for post-traumatic seizure prophylaxis.  (2) left 3-8 and right 7th rib fractures with occult left pneumothorax: pain control  (3) left iliac wing fracture with hematoma: required transfusion early in her ICU course, after which CBC were stable  (4) left forehead laceration s/p suture repair  (5) s/p successful closed reduction of left pinky PIP dislocation with delfin splint  (6) left clavicle fracture  (7) left nasal bone and left orbital rim fractures  (8) sternal fracture  Incidental findings: right renal lesion. Pt's  was notified and copy of the report given to him.     The patient was admitted to Trauma Surgery under SICU care.  Ortho was consulted for L. iliac wing fx, sternal fx, L. clavicle fx- recommended WBAT of the affected left lower extremity with walker, WBAT L shoulder, pendulum exercises okay, no lifting, sling for comfort, Encourage ROM of elbow/wrist/hand, PT/OT consult, NO urgent orthopedic surgical intervention at this time.    Neurosurgery consulted- recommended repeat CTH    PRS was consulted for non displaced left superior orbital rim and non displaced left nasal bone fracture--> No indication for operative fixation of these non displaced fractures; recommend ice to face to aid in swelling and analgesia.    She was placed on hemorrhage watch in the SICU given pelvic fracture and surrounding hematoma.  Behavior health was consulted for delirium.    4/20- Completed hemorrhage watch  - Stable repeat head CT  - CXR with unchanged pneumothorax  - Poor mental status, AxOx1 to person only     Hand was consulted for L. pinky PIP dislocation- s/p closed reduction, rec Cont delfin taping for now.    4/21- CTH stable  - Added home lexapro  - Added NS @ 50cc/hr to supplement poor PO intake  - Lactate cleared (1.9)    Patient was evaluated by PT/OT and recommended ACUTE rehab.  PMNR was consulted and recommended acute TBI rehab.    4/22- Transfused 1u pRBC, Hct 20.9 -> 26.9  - Went into SVT, given adenosine x1 and started on metoprolol 25mg q12hr  - Added oxycodone 5mg q4hrs prn for increased pain control  - PO intake improved, IVL    4/23- EP was consulted for SVT, recc continue lopressor 25mg   Metoprolol PO increased from 25mg q12hrs to 25mg q6hrs.   - Added salt tabs 1g q8hrs and started on NS @ 50cc/hr for hyponatremia  - Spangler discontinued, passed TOV  - Added lovenox  - Cervical collar cleared by confrontational exam     4/24- Patient transferred from SICU to surgical floor in stable condition.  Discharge planning to acute rehab.     4/24-4/27 patient was tolerating diet, pain was controlled, voiding, and having bowel movements.     Stable from neurologic perspective consistent with mild TBI  Multiple rib fractures - breathing comfortable with multimodal pain control    On day of discharge, the patient was tolerating diet, working with PT well and pain controlled. The patient will be discharged to acute rehab with instructions for outpatient follow up with Dr. Peña as well as consulting specialities.

## 2021-04-24 LAB
ANION GAP SERPL CALC-SCNC: 10 MMOL/L — SIGNIFICANT CHANGE UP (ref 5–17)
APTT BLD: 25 SEC — LOW (ref 27.5–35.5)
BUN SERPL-MCNC: 9 MG/DL — SIGNIFICANT CHANGE UP (ref 7–23)
CALCIUM SERPL-MCNC: 8.6 MG/DL — SIGNIFICANT CHANGE UP (ref 8.4–10.5)
CHLORIDE SERPL-SCNC: 103 MMOL/L — SIGNIFICANT CHANGE UP (ref 96–108)
CO2 SERPL-SCNC: 25 MMOL/L — SIGNIFICANT CHANGE UP (ref 22–31)
CREAT SERPL-MCNC: 0.38 MG/DL — LOW (ref 0.5–1.3)
GLUCOSE SERPL-MCNC: 137 MG/DL — HIGH (ref 70–99)
HCT VFR BLD CALC: 29.9 % — LOW (ref 34.5–45)
HGB BLD-MCNC: 10.2 G/DL — LOW (ref 11.5–15.5)
INR BLD: 1.06 RATIO — SIGNIFICANT CHANGE UP (ref 0.88–1.16)
MAGNESIUM SERPL-MCNC: 2.2 MG/DL — SIGNIFICANT CHANGE UP (ref 1.6–2.6)
MCHC RBC-ENTMCNC: 29.7 PG — SIGNIFICANT CHANGE UP (ref 27–34)
MCHC RBC-ENTMCNC: 34.1 GM/DL — SIGNIFICANT CHANGE UP (ref 32–36)
MCV RBC AUTO: 86.9 FL — SIGNIFICANT CHANGE UP (ref 80–100)
NRBC # BLD: 0 /100 WBCS — SIGNIFICANT CHANGE UP (ref 0–0)
PHOSPHATE SERPL-MCNC: 2.8 MG/DL — SIGNIFICANT CHANGE UP (ref 2.5–4.5)
PLATELET # BLD AUTO: 128 K/UL — LOW (ref 150–400)
POTASSIUM SERPL-MCNC: 3.5 MMOL/L — SIGNIFICANT CHANGE UP (ref 3.5–5.3)
POTASSIUM SERPL-SCNC: 3.5 MMOL/L — SIGNIFICANT CHANGE UP (ref 3.5–5.3)
PROTHROM AB SERPL-ACNC: 12.7 SEC — SIGNIFICANT CHANGE UP (ref 10.6–13.6)
RBC # BLD: 3.44 M/UL — LOW (ref 3.8–5.2)
RBC # FLD: 14.4 % — SIGNIFICANT CHANGE UP (ref 10.3–14.5)
SODIUM SERPL-SCNC: 138 MMOL/L — SIGNIFICANT CHANGE UP (ref 135–145)
WBC # BLD: 9.52 K/UL — SIGNIFICANT CHANGE UP (ref 3.8–10.5)
WBC # FLD AUTO: 9.52 K/UL — SIGNIFICANT CHANGE UP (ref 3.8–10.5)

## 2021-04-24 PROCEDURE — 71045 X-RAY EXAM CHEST 1 VIEW: CPT | Mod: 26

## 2021-04-24 RX ADMIN — ENOXAPARIN SODIUM 40 MILLIGRAM(S): 100 INJECTION SUBCUTANEOUS at 11:26

## 2021-04-24 RX ADMIN — SODIUM CHLORIDE 1 GRAM(S): 9 INJECTION INTRAMUSCULAR; INTRAVENOUS; SUBCUTANEOUS at 21:48

## 2021-04-24 RX ADMIN — SODIUM CHLORIDE 1 GRAM(S): 9 INJECTION INTRAMUSCULAR; INTRAVENOUS; SUBCUTANEOUS at 14:10

## 2021-04-24 RX ADMIN — LEVETIRACETAM 500 MILLIGRAM(S): 250 TABLET, FILM COATED ORAL at 05:39

## 2021-04-24 RX ADMIN — ESCITALOPRAM OXALATE 10 MILLIGRAM(S): 10 TABLET, FILM COATED ORAL at 11:26

## 2021-04-24 RX ADMIN — LIDOCAINE 1 PATCH: 4 CREAM TOPICAL at 00:00

## 2021-04-24 RX ADMIN — Medication 50 MILLIGRAM(S): at 05:39

## 2021-04-24 RX ADMIN — OXYCODONE HYDROCHLORIDE 5 MILLIGRAM(S): 5 TABLET ORAL at 11:27

## 2021-04-24 RX ADMIN — OXYCODONE HYDROCHLORIDE 5 MILLIGRAM(S): 5 TABLET ORAL at 11:26

## 2021-04-24 RX ADMIN — SODIUM CHLORIDE 1 GRAM(S): 9 INJECTION INTRAMUSCULAR; INTRAVENOUS; SUBCUTANEOUS at 05:39

## 2021-04-24 RX ADMIN — Medication 50 MILLIGRAM(S): at 17:06

## 2021-04-24 RX ADMIN — LIDOCAINE 1 PATCH: 4 CREAM TOPICAL at 11:26

## 2021-04-24 RX ADMIN — LIDOCAINE 1 PATCH: 4 CREAM TOPICAL at 23:00

## 2021-04-24 RX ADMIN — LEVETIRACETAM 500 MILLIGRAM(S): 250 TABLET, FILM COATED ORAL at 17:05

## 2021-04-24 RX ADMIN — Medication 112 MICROGRAM(S): at 05:39

## 2021-04-24 RX ADMIN — SENNA PLUS 2 TABLET(S): 8.6 TABLET ORAL at 21:48

## 2021-04-24 RX ADMIN — LIDOCAINE 1 PATCH: 4 CREAM TOPICAL at 19:31

## 2021-04-24 NOTE — PROGRESS NOTE ADULT - ASSESSMENT
65F hx breast cancer s/p mastectomy, HTN, hypothryoidism, not on full anticoagulation/antiplatelets per EMS, presenting as a level 2 trauma activation after a fall from a flight of stairs found to have (1) SAH, (2) left-sided rib fractures, (3) left iliac wing fracture with hematoma, (4) left forehead laceration s/p successful closed reduction of left pinky PIP dislocation.      Plan:   - Neuro checks q 4hr  - Keppra 500 q 12hrs x 7 days  - Pain control with standing Tylenol, Lidoderm patch and Dilaudid prn. And added oxycodone 5mg q4hrs  - RD  - Incidental R renal lesion to be discussed  - Tertiary exam  - Will obtain home medications from son  - DVT ppx -> will require 6 weeks of prophylactic Lovenox per ortho attending consultation note  - PT recs: AR  - F/u Dr. Choe OP    ACS  [phone]

## 2021-04-24 NOTE — PROGRESS NOTE ADULT - SUBJECTIVE AND OBJECTIVE BOX
HPI:  66yo Female with Hx of __ who p/w __, now POD_ from     24h Events:   - C-collar cleared  - Advanced to RD  - Rate controlled  - DVT ppx started  - TTF  - Overnight, no acute events    Subjective:   Patient examined at bedside this AM. Reports     Objective:  Vital Signs  T(C): 36.8 (04-23 @ 20:01), Max: 36.8 (04-23 @ 07:00)  HR: 80 (04-23 @ 20:01) (62 - 94)  BP: 139/83 (04-23 @ 20:01) (121/77 - 171/91)  RR: 21 (04-23 @ 20:01) (14 - 22)  SpO2: 95% (04-23 @ 20:01) (91% - 97%)  04-22-21 @ 07:01  -  04-23-21 @ 07:00  --------------------------------------------------------  IN:  Total IN: 0 mL    OUT:    Indwelling Catheter - Urethral (mL): 700 mL    Intermittent Catheterization - Urethral (mL): 850 mL    Voided (mL): 1001 mL  Total OUT: 2551 mL    Total NET: -2551 mL      04-23-21 @ 07:01  -  04-24-21 @ 03:25  --------------------------------------------------------  IN:  Total IN: 0 mL    OUT:    Voided (mL): 1100 mL  Total OUT: 1100 mL    Total NET: -1100 mL    Physical Exam:  General Appearance: Resting comfortably, no acute distress  HEENT: Ecchymosis of eyes with surrounding edema  Chest: non-labored breathing, no respiratory distress  CV: Pulse regular presently  Abdomen: Soft, non-tender, non-distended, no peritonitis   Incision: C/I/D  Extremities: warm and well perfused    Labs:                        8.8    9.14  )-----------( 97       ( 23 Apr 2021 01:54 )             25.3   04-23    139  |  102  |  7   ----------------------------<  143<H>  3.8   |  23  |  <0.30<L>    Ca    7.8<L>      23 Apr 2021 15:43  Phos  1.9     04-23  Mg     2.0     04-23    TPro  5.0<L>  /  Alb  2.8<L>  /  TBili  0.8  /  DBili  x   /  AST  20  /  ALT  24  /  AlkPhos  58  04-23    CAPILLARY BLOOD GLUCOSE          Medications:   MEDICATIONS  (STANDING):  chlorhexidine 2% Cloths 1 Application(s) Topical <User Schedule>  enoxaparin Injectable 40 milliGRAM(s) SubCutaneous every 24 hours  escitalopram 10 milliGRAM(s) Oral daily  levETIRAcetam 500 milliGRAM(s) Oral two times a day  levothyroxine 112 MICROGram(s) Oral daily  lidocaine   Patch 1 Patch Transdermal daily  metoprolol tartrate 50 milliGRAM(s) Oral every 12 hours  senna 2 Tablet(s) Oral at bedtime  sodium chloride 1 Gram(s) Oral every 8 hours    MEDICATIONS  (PRN):  acetaminophen  IVPB .. 1000 milliGRAM(s) IV Intermittent every 6 hours PRN Mild Pain (1 - 3)  oxyCODONE    IR 5 milliGRAM(s) Oral every 4 hours PRN Moderate Pain (4 - 6)   HPI:  LE trauma activation after a fall from a flight of stairs found to have:  (1) SAH  (2) left-sided rib fractures  (3) left iliac wing fracture with hematoma  (4) left forehead laceration s/p successful closed reduction of left pinky PIP dislocation      24h Events:   - C-collar cleared  - Advanced to RD  - Rate controlled  - DVT ppx started  - TTF  - Overnight, no acute events    Subjective:   Patient examined at bedside this AM. Reports     Objective:  Vital Signs  T(C): 36.8 (04-23 @ 20:01), Max: 36.8 (04-23 @ 07:00)  HR: 80 (04-23 @ 20:01) (62 - 94)  BP: 139/83 (04-23 @ 20:01) (121/77 - 171/91)  RR: 21 (04-23 @ 20:01) (14 - 22)  SpO2: 95% (04-23 @ 20:01) (91% - 97%)  04-22-21 @ 07:01  -  04-23-21 @ 07:00  --------------------------------------------------------  IN:  Total IN: 0 mL    OUT:    Indwelling Catheter - Urethral (mL): 700 mL    Intermittent Catheterization - Urethral (mL): 850 mL    Voided (mL): 1001 mL  Total OUT: 2551 mL    Total NET: -2551 mL      04-23-21 @ 07:01  -  04-24-21 @ 03:25  --------------------------------------------------------  IN:  Total IN: 0 mL    OUT:    Voided (mL): 1100 mL  Total OUT: 1100 mL    Total NET: -1100 mL    Physical Exam:  General Appearance: Resting comfortably, no acute distress  HEENT: Ecchymosis of eyes with surrounding edema  Chest: non-labored breathing, no respiratory distress  CV: Pulse regular presently  Abdomen: Soft, non-tender, non-distended, no peritonitis   Incision: C/I/D  Extremities: warm and well perfused    Labs:                        8.8    9.14  )-----------( 97       ( 23 Apr 2021 01:54 )             25.3   04-23    139  |  102  |  7   ----------------------------<  143<H>  3.8   |  23  |  <0.30<L>    Ca    7.8<L>      23 Apr 2021 15:43  Phos  1.9     04-23  Mg     2.0     04-23    TPro  5.0<L>  /  Alb  2.8<L>  /  TBili  0.8  /  DBili  x   /  AST  20  /  ALT  24  /  AlkPhos  58  04-23    CAPILLARY BLOOD GLUCOSE          Medications:   MEDICATIONS  (STANDING):  chlorhexidine 2% Cloths 1 Application(s) Topical <User Schedule>  enoxaparin Injectable 40 milliGRAM(s) SubCutaneous every 24 hours  escitalopram 10 milliGRAM(s) Oral daily  levETIRAcetam 500 milliGRAM(s) Oral two times a day  levothyroxine 112 MICROGram(s) Oral daily  lidocaine   Patch 1 Patch Transdermal daily  metoprolol tartrate 50 milliGRAM(s) Oral every 12 hours  senna 2 Tablet(s) Oral at bedtime  sodium chloride 1 Gram(s) Oral every 8 hours    MEDICATIONS  (PRN):  acetaminophen  IVPB .. 1000 milliGRAM(s) IV Intermittent every 6 hours PRN Mild Pain (1 - 3)  oxyCODONE    IR 5 milliGRAM(s) Oral every 4 hours PRN Moderate Pain (4 - 6)

## 2021-04-25 LAB
ANION GAP SERPL CALC-SCNC: 13 MMOL/L — SIGNIFICANT CHANGE UP (ref 5–17)
APTT BLD: 25.4 SEC — LOW (ref 27.5–35.5)
BUN SERPL-MCNC: 15 MG/DL — SIGNIFICANT CHANGE UP (ref 7–23)
CALCIUM SERPL-MCNC: 8.4 MG/DL — SIGNIFICANT CHANGE UP (ref 8.4–10.5)
CHLORIDE SERPL-SCNC: 106 MMOL/L — SIGNIFICANT CHANGE UP (ref 96–108)
CO2 SERPL-SCNC: 23 MMOL/L — SIGNIFICANT CHANGE UP (ref 22–31)
CREAT SERPL-MCNC: 0.41 MG/DL — LOW (ref 0.5–1.3)
GLUCOSE SERPL-MCNC: 126 MG/DL — HIGH (ref 70–99)
HCT VFR BLD CALC: 29.6 % — LOW (ref 34.5–45)
HGB BLD-MCNC: 9.8 G/DL — LOW (ref 11.5–15.5)
INR BLD: 1.03 RATIO — SIGNIFICANT CHANGE UP (ref 0.88–1.16)
MAGNESIUM SERPL-MCNC: 2.2 MG/DL — SIGNIFICANT CHANGE UP (ref 1.6–2.6)
MCHC RBC-ENTMCNC: 29.5 PG — SIGNIFICANT CHANGE UP (ref 27–34)
MCHC RBC-ENTMCNC: 33.1 GM/DL — SIGNIFICANT CHANGE UP (ref 32–36)
MCV RBC AUTO: 89.2 FL — SIGNIFICANT CHANGE UP (ref 80–100)
NRBC # BLD: 0 /100 WBCS — SIGNIFICANT CHANGE UP (ref 0–0)
PHOSPHATE SERPL-MCNC: 2.9 MG/DL — SIGNIFICANT CHANGE UP (ref 2.5–4.5)
PLATELET # BLD AUTO: 142 K/UL — LOW (ref 150–400)
POTASSIUM SERPL-MCNC: 3.9 MMOL/L — SIGNIFICANT CHANGE UP (ref 3.5–5.3)
POTASSIUM SERPL-SCNC: 3.9 MMOL/L — SIGNIFICANT CHANGE UP (ref 3.5–5.3)
PROTHROM AB SERPL-ACNC: 12.3 SEC — SIGNIFICANT CHANGE UP (ref 10.6–13.6)
RBC # BLD: 3.32 M/UL — LOW (ref 3.8–5.2)
RBC # FLD: 14.5 % — SIGNIFICANT CHANGE UP (ref 10.3–14.5)
SODIUM SERPL-SCNC: 142 MMOL/L — SIGNIFICANT CHANGE UP (ref 135–145)
WBC # BLD: 9.27 K/UL — SIGNIFICANT CHANGE UP (ref 3.8–10.5)
WBC # FLD AUTO: 9.27 K/UL — SIGNIFICANT CHANGE UP (ref 3.8–10.5)

## 2021-04-25 PROCEDURE — 71045 X-RAY EXAM CHEST 1 VIEW: CPT | Mod: 26

## 2021-04-25 RX ORDER — ACETAMINOPHEN 500 MG
100 TABLET ORAL
Qty: 0 | Refills: 0 | DISCHARGE
Start: 2021-04-25

## 2021-04-25 RX ORDER — METOPROLOL TARTRATE 50 MG
1 TABLET ORAL
Qty: 60 | Refills: 0
Start: 2021-04-25 | End: 2021-05-24

## 2021-04-25 RX ORDER — ACETAMINOPHEN 500 MG
1 TABLET ORAL
Qty: 60 | Refills: 0
Start: 2021-04-25

## 2021-04-25 RX ORDER — FUROSEMIDE 40 MG
10 TABLET ORAL DAILY
Refills: 0 | Status: DISCONTINUED | OUTPATIENT
Start: 2021-04-25 | End: 2021-04-25

## 2021-04-25 RX ORDER — LEVETIRACETAM 250 MG/1
1 TABLET, FILM COATED ORAL
Qty: 4 | Refills: 0
Start: 2021-04-25 | End: 2021-04-26

## 2021-04-25 RX ORDER — SODIUM,POTASSIUM PHOSPHATES 278-250MG
1 POWDER IN PACKET (EA) ORAL ONCE
Refills: 0 | Status: COMPLETED | OUTPATIENT
Start: 2021-04-25 | End: 2021-04-25

## 2021-04-25 RX ORDER — FUROSEMIDE 40 MG
10 TABLET ORAL DAILY
Refills: 0 | Status: COMPLETED | OUTPATIENT
Start: 2021-04-25 | End: 2021-04-25

## 2021-04-25 RX ORDER — LIDOCAINE 4 G/100G
1 CREAM TOPICAL
Qty: 14 | Refills: 0
Start: 2021-04-25 | End: 2021-05-08

## 2021-04-25 RX ORDER — OXYCODONE HYDROCHLORIDE 5 MG/1
1 TABLET ORAL
Qty: 12 | Refills: 0
Start: 2021-04-25 | End: 2021-04-27

## 2021-04-25 RX ADMIN — LEVETIRACETAM 500 MILLIGRAM(S): 250 TABLET, FILM COATED ORAL at 05:12

## 2021-04-25 RX ADMIN — Medication 10 MILLIGRAM(S): at 17:39

## 2021-04-25 RX ADMIN — ESCITALOPRAM OXALATE 10 MILLIGRAM(S): 10 TABLET, FILM COATED ORAL at 10:03

## 2021-04-25 RX ADMIN — SODIUM CHLORIDE 1 GRAM(S): 9 INJECTION INTRAMUSCULAR; INTRAVENOUS; SUBCUTANEOUS at 05:12

## 2021-04-25 RX ADMIN — ENOXAPARIN SODIUM 40 MILLIGRAM(S): 100 INJECTION SUBCUTANEOUS at 10:03

## 2021-04-25 RX ADMIN — SODIUM CHLORIDE 1 GRAM(S): 9 INJECTION INTRAMUSCULAR; INTRAVENOUS; SUBCUTANEOUS at 13:21

## 2021-04-25 RX ADMIN — Medication 1 PACKET(S): at 17:20

## 2021-04-25 RX ADMIN — LEVETIRACETAM 500 MILLIGRAM(S): 250 TABLET, FILM COATED ORAL at 17:10

## 2021-04-25 RX ADMIN — LIDOCAINE 1 PATCH: 4 CREAM TOPICAL at 10:02

## 2021-04-25 RX ADMIN — Medication 10 MILLIGRAM(S): at 09:49

## 2021-04-25 RX ADMIN — Medication 50 MILLIGRAM(S): at 20:24

## 2021-04-25 RX ADMIN — LIDOCAINE 1 PATCH: 4 CREAM TOPICAL at 19:48

## 2021-04-25 RX ADMIN — Medication 112 MICROGRAM(S): at 05:12

## 2021-04-25 RX ADMIN — LIDOCAINE 1 PATCH: 4 CREAM TOPICAL at 22:10

## 2021-04-25 RX ADMIN — Medication 50 MILLIGRAM(S): at 05:12

## 2021-04-25 NOTE — PROGRESS NOTE ADULT - SUBJECTIVE AND OBJECTIVE BOX
ACS Daily Progress Note      Subjective:   Patient seen at bedside this AM. Reports feeling well, without complaints. Denies chest pain, SOB.         Objective:  Vital Signs  T(C): 36.7 (04-25 @ 05:00), Max: 37.5 (04-24 @ 21:44)  HR: 72 (04-25 @ 05:00) (70 - 83)  BP: 127/79 (04-25 @ 05:00) (111/72 - 132/75)  RR: 18 (04-25 @ 05:00) (16 - 18)  SpO2: 95% (04-25 @ 05:00) (94% - 96%)  04-23-21 @ 07:01  -  04-24-21 @ 07:00  --------------------------------------------------------  IN:  Total IN: 0 mL    OUT:    Voided (mL): 1100 mL  Total OUT: 1100 mL    Total NET: -1100 mL      04-24-21 @ 07:01  -  04-25-21 @ 06:46  --------------------------------------------------------  IN:  Total IN: 0 mL    OUT:    Voided (mL): 250 mL  Total OUT: 250 mL    Total NET: -250 mL          Physical Exam:  GEN: resting in bed comfortably in NAD, orbital ecchymosis with edema  RESP: no increased WOB  ABD: soft, non-distended, non-tender without rebound tenderness or guarding  EXTR: warm, well-perfused without gross deformities; spontaneous movement in b/l U/L extrem  NEURO: AAOx2    Labs:                        9.8    9.27  )-----------( 142      ( 25 Apr 2021 05:49 )             29.6   04-25    142  |  106  |  15  ----------------------------<  126<H>  3.9   |  23  |  0.41<L>    Ca    8.4      25 Apr 2021 05:49  Phos  2.9     04-25  Mg     2.2     04-25      CAPILLARY BLOOD GLUCOSE          Medications:   MEDICATIONS  (STANDING):  enoxaparin Injectable 40 milliGRAM(s) SubCutaneous every 24 hours  escitalopram 10 milliGRAM(s) Oral daily  levETIRAcetam 500 milliGRAM(s) Oral two times a day  levothyroxine 112 MICROGram(s) Oral daily  lidocaine   Patch 1 Patch Transdermal daily  metoprolol tartrate 50 milliGRAM(s) Oral every 12 hours  senna 2 Tablet(s) Oral at bedtime  sodium chloride 1 Gram(s) Oral every 8 hours    MEDICATIONS  (PRN):  acetaminophen  IVPB .. 1000 milliGRAM(s) IV Intermittent every 6 hours PRN Mild Pain (1 - 3)  oxyCODONE    IR 5 milliGRAM(s) Oral every 4 hours PRN Moderate Pain (4 - 6)      Imaging:

## 2021-04-25 NOTE — PROGRESS NOTE ADULT - ASSESSMENT
65F hx breast cancer s/p mastectomy, HTN, hypothryoidism, not on full anticoagulation/antiplatelets per EMS, presenting as a level 2 trauma activation after a fall from a flight of stairs found to have (1) SAH, (2) left-sided rib fractures, (3) left iliac wing fracture with hematoma, (4) left forehead laceration s/p successful closed reduction of left pinky PIP dislocation.      Plan:   - Neuro checks q 4hr  - Keppra 500 q 12hrs x 7 days  - Pain control with standing Tylenol, Lidoderm patch and Dilaudid prn. And added oxycodone 5mg q4hrs  - RD  - Incidental R renal lesion to be discussed  - Tertiary exam  - Will obtain home medications from son  - DVT ppx -> will require 6 weeks of prophylactic Lovenox per ortho attending consultation note  - PT recs: AR -  unable to contact [place] for bed yesterday  - F/u Dr. Choe OP    ACS  [phone]

## 2021-04-26 DIAGNOSIS — Z29.9 ENCOUNTER FOR PROPHYLACTIC MEASURES, UNSPECIFIED: ICD-10-CM

## 2021-04-26 DIAGNOSIS — S06.6X0S TRAUMATIC SUBARACHNOID HEMORRHAGE WITHOUT LOSS OF CONSCIOUSNESS, SEQUELA: ICD-10-CM

## 2021-04-26 DIAGNOSIS — S27.0XXA TRAUMATIC PNEUMOTHORAX, INITIAL ENCOUNTER: ICD-10-CM

## 2021-04-26 DIAGNOSIS — S32.302S: ICD-10-CM

## 2021-04-26 DIAGNOSIS — E03.9 HYPOTHYROIDISM, UNSPECIFIED: ICD-10-CM

## 2021-04-26 DIAGNOSIS — I47.1 SUPRAVENTRICULAR TACHYCARDIA: ICD-10-CM

## 2021-04-26 DIAGNOSIS — S02.122A FRACTURE OF ORBITAL ROOF, LEFT SIDE, INITIAL ENCOUNTER FOR CLOSED FRACTURE: ICD-10-CM

## 2021-04-26 LAB
ANION GAP SERPL CALC-SCNC: 14 MMOL/L — SIGNIFICANT CHANGE UP (ref 5–17)
BUN SERPL-MCNC: 17 MG/DL — SIGNIFICANT CHANGE UP (ref 7–23)
CALCIUM SERPL-MCNC: 8.4 MG/DL — SIGNIFICANT CHANGE UP (ref 8.4–10.5)
CHLORIDE SERPL-SCNC: 108 MMOL/L — SIGNIFICANT CHANGE UP (ref 96–108)
CO2 SERPL-SCNC: 21 MMOL/L — LOW (ref 22–31)
CREAT SERPL-MCNC: 0.43 MG/DL — LOW (ref 0.5–1.3)
GLUCOSE SERPL-MCNC: 132 MG/DL — HIGH (ref 70–99)
HCT VFR BLD CALC: 28.8 % — LOW (ref 34.5–45)
HGB BLD-MCNC: 9.5 G/DL — LOW (ref 11.5–15.5)
MAGNESIUM SERPL-MCNC: 2.1 MG/DL — SIGNIFICANT CHANGE UP (ref 1.6–2.6)
MCHC RBC-ENTMCNC: 30.1 PG — SIGNIFICANT CHANGE UP (ref 27–34)
MCHC RBC-ENTMCNC: 33 GM/DL — SIGNIFICANT CHANGE UP (ref 32–36)
MCV RBC AUTO: 91.1 FL — SIGNIFICANT CHANGE UP (ref 80–100)
NRBC # BLD: 0 /100 WBCS — SIGNIFICANT CHANGE UP (ref 0–0)
PHOSPHATE SERPL-MCNC: 2.6 MG/DL — SIGNIFICANT CHANGE UP (ref 2.5–4.5)
PLATELET # BLD AUTO: 171 K/UL — SIGNIFICANT CHANGE UP (ref 150–400)
POTASSIUM SERPL-MCNC: 3.8 MMOL/L — SIGNIFICANT CHANGE UP (ref 3.5–5.3)
POTASSIUM SERPL-SCNC: 3.8 MMOL/L — SIGNIFICANT CHANGE UP (ref 3.5–5.3)
RBC # BLD: 3.16 M/UL — LOW (ref 3.8–5.2)
RBC # FLD: 14.7 % — HIGH (ref 10.3–14.5)
SARS-COV-2 RNA SPEC QL NAA+PROBE: SIGNIFICANT CHANGE UP
SODIUM SERPL-SCNC: 143 MMOL/L — SIGNIFICANT CHANGE UP (ref 135–145)
WBC # BLD: 10.3 K/UL — SIGNIFICANT CHANGE UP (ref 3.8–10.5)
WBC # FLD AUTO: 10.3 K/UL — SIGNIFICANT CHANGE UP (ref 3.8–10.5)

## 2021-04-26 PROCEDURE — 99223 1ST HOSP IP/OBS HIGH 75: CPT

## 2021-04-26 PROCEDURE — 99232 SBSQ HOSP IP/OBS MODERATE 35: CPT

## 2021-04-26 RX ADMIN — ENOXAPARIN SODIUM 40 MILLIGRAM(S): 100 INJECTION SUBCUTANEOUS at 12:00

## 2021-04-26 RX ADMIN — Medication 50 MILLIGRAM(S): at 17:45

## 2021-04-26 RX ADMIN — ESCITALOPRAM OXALATE 10 MILLIGRAM(S): 10 TABLET, FILM COATED ORAL at 12:00

## 2021-04-26 RX ADMIN — Medication 50 MILLIGRAM(S): at 05:16

## 2021-04-26 RX ADMIN — Medication 112 MICROGRAM(S): at 05:16

## 2021-04-26 RX ADMIN — LIDOCAINE 1 PATCH: 4 CREAM TOPICAL at 23:48

## 2021-04-26 RX ADMIN — LIDOCAINE 1 PATCH: 4 CREAM TOPICAL at 12:00

## 2021-04-26 RX ADMIN — LEVETIRACETAM 500 MILLIGRAM(S): 250 TABLET, FILM COATED ORAL at 05:16

## 2021-04-26 RX ADMIN — LIDOCAINE 1 PATCH: 4 CREAM TOPICAL at 17:44

## 2021-04-26 NOTE — CONSULT NOTE ADULT - CONSULT REQUESTED DATE/TIME
19-Apr-2021 12:04
19-Apr-2021 13:15
21-Apr-2021 09:55
26-Apr-2021
19-Apr-2021 11:24
19-Apr-2021 15:01
22-Apr-2021 18:02

## 2021-04-26 NOTE — PROGRESS NOTE ADULT - ASSESSMENT
65y Female with h/o HTN, breast CA s/p L mastectomy, hypothyroidism admitted as level II trauma s/p fall down stairs with several fractures and SAH. 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 Hernández [phone]    EP signing off- please reconsult with additional inquiries     David Aquino MD  Cardiology Fellow  [phone]    Please check [email or web address]: [email or web address] for cardiology service schedule and contact information.

## 2021-04-26 NOTE — CONSULT NOTE ADULT - ASSESSMENT
65F hx breast cancer s/p mastectomy, HTN, hypothyroidism who presented after a fall from a flight of stairs found to have SAH, left-sided rib fractures, left iliac wing fracture with hematoma, left forehead laceration s/p successful closed reduction of left pinky PIP dislocation.    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

## 2021-04-26 NOTE — CONSULT NOTE ADULT - NSPROBSELRECBLANK_5_GEN
----- Message from Salty Figueroa sent at 11/4/2019  3:25 PM CST -----  Contact: ALEXIS SPARROW [[id number]]  Type:  Patient Returning Call    Who Called: ALEXIS SPARROW [[id number]]     Who Left Message for Patient: Bam    Does the patient know what this is regarding?:No    Best Call Back Number:[phone] ([place])       Additional Information:  
Call returned, appt rescheduled with pt. Pt expressed understanding.  
DISPLAY PLAN FREE TEXT

## 2021-04-26 NOTE — CONSULT NOTE ADULT - PROBLEM SELECTOR RECOMMENDATION 9
- found to have small scattered B/L traumatic SAHs  - s/p  Keppra 500 q 12hrs x 7 days  - Pain control   -c/w home lexapro

## 2021-04-26 NOTE — CONSULT NOTE ADULT - PROVIDER SPECIALTY LIST ADULT
Hospitalist
Plastic Surgery
Critical Care
Electrophysiology
Plastic Surgery
Neurosurgery
Rehab Medicine
Orthopedics

## 2021-04-26 NOTE — CONSULT NOTE ADULT - SUBJECTIVE AND OBJECTIVE BOX
TRAUMA/ACUTE CARE SURGERY - HOSPITAL MEDICINE CO-MANAGEMENT INITIAL VISIT NOTE    CHIEF COMPLAINT: Patient is a 65y old  Female who presents with a chief complaint of Fall from flight of stairs (26 Apr 2021 10:13)      HPI: 65F hx breast cancer s/p mastectomy, HTN, hypothryoidism, not on full anticoagulation/antiplatelets per EMS, presenting as a level 2 trauma activation after a fall from a flight of stairs. Patient only able to say "ow" in trauma bay, unable to provide further history. However per son, normal mentation at baseline.     In the trauma bay, airway was intact with bilateral breath sounds, O2 saturation 100% on room air. Initially blood pressure was 140s systolic however on repeat 90s. NS@100 initiated. Secondary significant for left forehead laceration, left chest wall tenderness, left pelvic tenderness.  (19 Apr 2021 11:16)      History limited due to: [ ] Dementia  [ ] Delirium  [ ] Condition    Allergies    Tapazole (Hives)    Intolerances        HOME MEDICATIONS: [ ] Reviewed  Home Medications:  Calcium 500+D oral tablet, chewable: 1 tab(s) orally 2 times a day (19 Apr 2021 12:03)  enoxaparin: 40 milligram(s) subcutaneous every 24 hours for 6 weeks (23 Apr 2021 15:07)  Synthroid 112 mcg (0.112 mg) oral tablet: 1 tab(s) orally once a day (19 Apr 2021 12:03)  tamoxifen 20 mg oral tablet: 1 tab(s) orally once a day (19 Apr 2021 12:03)      MEDICATIONS  (STANDING):  enoxaparin Injectable 40 milliGRAM(s) SubCutaneous every 24 hours  escitalopram 10 milliGRAM(s) Oral daily  levothyroxine 112 MICROGram(s) Oral daily  lidocaine   Patch 1 Patch Transdermal daily  metoprolol tartrate 50 milliGRAM(s) Oral every 12 hours  senna 2 Tablet(s) Oral at bedtime    MEDICATIONS  (PRN):  acetaminophen  IVPB .. 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    C Section    Abnormality, eye  h/o left eye vitrectomy    S/P D&amp;C (status post dilation and curettage)    H/O left mastectomy    [ ] Reviewed     Functional Assessment: [ ] Independent  [ ] Assistance  [ ] Total care  [ ] Non-ambulatory    SOCIAL HISTORY:  Residence: [ ] LIS  [ ] SNF  [ ] Community  [ ] Substance abuse:   [ ] Tobacco:   [ ] Alcohol use:     FAMILY HISTORY:  [ ] No pertinent family history in first degree relatives     REVIEW OF SYSTEMS:    CONSTITUTIONAL: No fever, weight loss, or fatigue  EYES: No eye pain, visual disturbances, or discharge  ENMT:  No difficulty hearing, tinnitus, vertigo; No sinus or throat pain  NECK: No pain or stiffness  BREASTS: No pain, masses, or nipple discharge  RESPIRATORY: No cough, wheezing, chills or hemoptysis; No shortness of breath  CARDIOVASCULAR: No chest pain, palpitations, dizziness, or leg swelling  GASTROINTESTINAL: No abdominal or epigastric pain. No nausea, vomiting, or hematemesis; No diarrhea or constipation. No melena or hematochezia.  GENITOURINARY: No dysuria, frequency, hematuria, or incontinence  NEUROLOGICAL: No headaches, memory loss, loss of strength, numbness, or tremors  SKIN: No itching, burning, rashes, or lesions   LYMPH NODES: No enlarged glands  ENDOCRINE: No heat or cold intolerance; No hair loss  MUSCULOSKELETAL: No muscle or back pain  PSYCHIATRIC: No depression, anxiety, mood swings, or difficulty sleeping  HEME/LYMPH: No easy bruising, or bleeding gums  ALLERGY AND IMMUNOLOGIC: No hives or eczema    [  ] All other ROS negative  [  ] Unable to obtain due to poor mental status    PHYSICAL EXAM:    Vital Signs Last 24 Hrs  T(C): 36.8 (26 Apr 2021 12:11), Max: 37 (26 Apr 2021 04:25)  T(F): 98.3 (26 Apr 2021 12:11), Max: 98.6 (26 Apr 2021 04:25)  HR: 68 (26 Apr 2021 12:11) (66 - 89)  BP: 127/81 (26 Apr 2021 12:11) (110/72 - 150/80)  BP(mean): --  RR: 18 (26 Apr 2021 12:11) (16 - 18)  SpO2: 91% (26 Apr 2021 12:11) (91% - 96%)    GENERAL: NAD, well-groomed, well-developed  HEAD:  Atraumatic, Normocephalic  EYES: EOMI, PERRLA, conjunctiva and sclera clear  ENMT: Moist mucous membranes  NECK: Supple, No JVD  RESPIRATORY: Clear to auscultation bilaterally; No rales, rhonchi, wheezing, or rubs  CARDIOVASCULAR: Regular rate and rhythm; No murmurs, rubs, or gallops  GASTROINTESTINAL: Soft, Nontender, Nondistended; Bowel sounds present  GENITOURINARY: Not examined  EXTREMITIES:  2+ Peripheral Pulses, No clubbing, cyanosis, or edema  NERVOUS SYSTEM:  Alert & Oriented X3; Moving all 4 extremities; No gross sensory deficits  HEME/LYMPH: No lymphadenopathy noted  SKIN: No rashes or lesions; Incisions C/D/I    LABS:                        9.5    10.30 )-----------( 171      ( 26 Apr 2021 05:11 )             28.8     Hemoglobin: 9.5 g/dL (04-26 @ 05:11)  Hemoglobin: 9.8 g/dL (04-25 @ 05:49)  Hemoglobin: 10.2 g/dL (04-24 @ 07:07)  Hemoglobin: 8.8 g/dL (04-23 @ 01:54)  Hemoglobin: 9.6 g/dL (04-22 @ 10:00)  Hemoglobin: 8.7 g/dL (04-22 @ 04:11)  Hemoglobin: 8.4 g/dL (04-21 @ 21:53)  Hemoglobin: 9.1 g/dL (04-21 @ 15:40)    04-26    143  |  108  |  17  ----------------------------<  132<H>  3.8   |  21<L>  |  0.43<L>    Ca    8.4      26 Apr 2021 05:11  Phos  2.6     04-26  Mg     2.1     04-26      PT/INR - ( 25 Apr 2021 05:49 )   PT: 12.3 sec;   INR: 1.03 ratio         PTT - ( 25 Apr 2021 05:49 )  PTT:25.4 sec    CAPILLARY BLOOD GLUCOSE            RADIOLOGY & ADDITIONAL STUDIES:    EKG:   Personally Reviewed:  [ ] YES     Imaging:   Personally Reviewed:  [ ] YES               [ ] Consultant(s) Notes Reviewed  [x] Care Discussed with Consultants/Other Providers: Trauma Team    [ ] Fall risks identified:    [ ] High risk medications identified:    [ ] Probable osteoporosis    [ ] Possible osteomalacia    [ ] Increased delirium risk    [ ] Delirium and other risks can be reduced by:          -early ambulation          -minimizing "tethers" - IV, oxygen, catheters, etc          -avoiding hypnotics and sedatives          -maintaining hydration/nutrition          -avoid anticholinergics - diphenhydramine, etc          -pain control          -supportive environment    Advanced Directives: [ ] DNR  [ ] No feeding tube  [ ] MOLST in chart  [ ] MOLST completed today  [ ] Unknown   TRAUMA/ACUTE CARE SURGERY - HOSPITAL MEDICINE CO-MANAGEMENT INITIAL VISIT NOTE    CHIEF COMPLAINT: Patient is a 65y old  Female who presents with a chief complaint of Fall from flight of stairs (26 Apr 2021 10:13)      HPI: 65F hx breast cancer s/p mastectomy, HTN, hypothyroidism who presented after a fall from a flight of stairs found to have SAH, left-sided rib fractures, left iliac wing fracture with hematoma, left forehead laceration s/p successful closed reduction of left pinky PIP dislocation.    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 Seen by EP ; started on Metoprolol 50 mg BID; has remained in NSR. ( 55- 70s on telemetry).  Pt denies CP, sob, pain.       Allergies    Tapazole (Hives)    Intolerances        HOME MEDICATIONS: [ ] Reviewed  Home Medications:  Calcium 500+D oral tablet, chewable: 1 tab(s) orally 2 times a day (19 Apr 2021 12:03)  enoxaparin: 40 milligram(s) subcutaneous every 24 hours for 6 weeks (23 Apr 2021 15:07)  Synthroid 112 mcg (0.112 mg) oral tablet: 1 tab(s) orally once a day (19 Apr 2021 12:03)  tamoxifen 20 mg oral tablet: 1 tab(s) orally once a day (19 Apr 2021 12:03)      MEDICATIONS  (STANDING):  enoxaparin Injectable 40 milliGRAM(s) SubCutaneous every 24 hours  escitalopram 10 milliGRAM(s) Oral daily  levothyroxine 112 MICROGram(s) Oral daily  lidocaine   Patch 1 Patch Transdermal daily  metoprolol tartrate 50 milliGRAM(s) Oral every 12 hours  senna 2 Tablet(s) Oral at bedtime    MEDICATIONS  (PRN):  acetaminophen  IVPB .. 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    C Section    Abnormality, eye  h/o left eye vitrectomy    S/P D&amp;C (status post dilation and curettage)    H/O left mastectomy    [ ] Reviewed     Functional Assessment: [x] Independent at baseline     SOCIAL HISTORY:  Residence: [ ] [facility]  [ ] SNF  [ x Community  Denies toxic habits    FAMILY HISTORY:  [x] No pertinent family history in first degree relatives     REVIEW OF SYSTEMS: Limited on account of condition        PHYSICAL EXAM:    Vital Signs Last 24 Hrs  T(C): 36.8 (26 Apr 2021 12:11), Max: 37 (26 Apr 2021 04:25)  T(F): 98.3 (26 Apr 2021 12:11), Max: 98.6 (26 Apr 2021 04:25)  HR: 68 (26 Apr 2021 12:11) (66 - 89)  BP: 127/81 (26 Apr 2021 12:11) (110/72 - 150/80)  BP(mean): --  RR: 18 (26 Apr 2021 12:11) (16 - 18)  SpO2: 91% (26 Apr 2021 12:11) (91% - 96%)    GENERAL: NAD, anicteric, afebrile  HEAD:  left sided forehead laceration; b/l periorbital ecchymosis improving   NECK: Supple, No JVD  RESPIRATORY: Clear to auscultation bilaterally; No rales, rhonchi, wheezing, or rubs  CARDIOVASCULAR: Regular rate and rhythm; No murmurs, rubs, or gallops  GASTROINTESTINAL: Soft, Nontender, Nondistended; Bowel sounds present  GENITOURINARY: Not examined  EXTREMITIES:  2+ Peripheral Pulses, No clubbing, cyanosis, or edema  NERVOUS SYSTEM: AAOX 2- 3  HEME/LYMPH: No lymphadenopathy noted  SKIN:  Incisions C/D/I    LABS:                        9.5    10.30 )-----------( 171      ( 26 Apr 2021 05:11 )             28.8     Hemoglobin: 9.5 g/dL (04-26 @ 05:11)  Hemoglobin: 9.8 g/dL (04-25 @ 05:49)  Hemoglobin: 10.2 g/dL (04-24 @ 07:07)  Hemoglobin: 8.8 g/dL (04-23 @ 01:54)  Hemoglobin: 9.6 g/dL (04-22 @ 10:00)  Hemoglobin: 8.7 g/dL (04-22 @ 04:11)  Hemoglobin: 8.4 g/dL (04-21 @ 21:53)  Hemoglobin: 9.1 g/dL (04-21 @ 15:40)    04-26    143  |  108  |  17  ----------------------------<  132<H>  3.8   |  21<L>  |  0.43<L>    Ca    8.4      26 Apr 2021 05:11  Phos  2.6     04-26  Mg     2.1     04-26      PT/INR - ( 25 Apr 2021 05:49 )   PT: 12.3 sec;   INR: 1.03 ratio         PTT - ( 25 Apr 2021 05:49 )  PTT:25.4 sec    CAPILLARY BLOOD GLUCOSE            RADIOLOGY & ADDITIONAL STUDIES:    EKG:   Personally Reviewed:  [X] YES     Imaging:   Personally Reviewed:  [ ] YES                 [x] Care Discussed with Consultants/Other Providers: Trauma Team    [ ] Fall risks identified: Ipaired functional mobility and postural contro       [X] Increased delirium risk    [ ] Delirium and other risks can be reduced by:          -early ambulation          -minimizing "tethers" - IV, oxygen, catheters, etc          -avoiding hypnotics and sedatives          -maintaining hydration/nutrition          -avoid anticholinergics - diphenhydramine, etc          -pain control          -supportive environment    Advanced Directives: [ ] DNR  [ ] No feeding tube  [ ] MOLST in chart  [ ] MOLST completed today  [ ] Unknown

## 2021-04-26 NOTE — PROGRESS NOTE ADULT - SUBJECTIVE AND OBJECTIVE BOX
ACS DAILY PROGRESS NOTE:       SUBJECTIVE/ROS: No events overnight- awaiting rehab placement- Denies nausea, vomiting, chest pain, shortness of breath         MEDICATIONS  (STANDING):  enoxaparin Injectable 40 milliGRAM(s) SubCutaneous every 24 hours  escitalopram 10 milliGRAM(s) Oral daily  levothyroxine 112 MICROGram(s) Oral daily  lidocaine   Patch 1 Patch Transdermal daily  metoprolol tartrate 50 milliGRAM(s) Oral every 12 hours  senna 2 Tablet(s) Oral at bedtime    MEDICATIONS  (PRN):  acetaminophen  IVPB .. 1000 milliGRAM(s) IV Intermittent every 6 hours PRN Mild Pain (1 - 3)  oxyCODONE    IR 5 milliGRAM(s) Oral every 4 hours PRN Moderate Pain (4 - 6)      OBJECTIVE:    Vital Signs Last 24 Hrs  T(C): 37 (2021 04:25), Max: 37 (2021 04:25)  T(F): 98.6 (2021 04:25), Max: 98.6 (2021 04:25)  HR: 74 (2021 04:25) (66 - 89)  BP: 150/80 (2021 04:25) (110/72 - 150/80)  BP(mean): --  RR: 18 (2021 04:25) (16 - 18)  SpO2: 92% (2021 04:25) (91% - 96%)        I&O's Detail    2021 07:01  -  2021 07:00  --------------------------------------------------------  IN:    Oral Fluid: 240 mL  Total IN: 240 mL    OUT:    Voided (mL): 650 mL  Total OUT: 650 mL    Total NET: -410 mL          Daily     Daily Weight in k.9 (2021 09:49)    LABS:                        9.5    10.30 )-----------( 171      ( 2021 05:11 )             28.8     -    143  |  108  |  17  ----------------------------<  132<H>  3.8   |  21<L>  |  0.43<L>    Ca    8.4      2021 05:11  Phos  2.6     -  Mg     2.1     -      PT/INR - ( 2021 05:49 )   PT: 12.3 sec;   INR: 1.03 ratio         PTT - ( 2021 05:49 )  PTT:25.4 sec            Physical Exam:  GEN: resting in bed comfortably in NAD, orbital ecchymosis with edema  RESP: no increased WOB  ABD: soft, non-distended, non-tender without rebound tenderness or guarding  EXTR: warm, well-perfused without gross deformities; spontaneous movement in b/l U/L extrem  NEURO: AAOx2

## 2021-04-26 NOTE — CONSULT NOTE ADULT - REASON FOR ADMISSION
Fall from flight of stairs

## 2021-04-26 NOTE — CONSULT NOTE ADULT - CONSULT REASON
indira
medical comanagement
Evaluate Rehabilitation Needs
L iliac wing fx
SVT
Facial fracture
Level II trauma s/p fall with multiple injuries

## 2021-04-26 NOTE — PROGRESS NOTE ADULT - SUBJECTIVE AND OBJECTIVE BOX
PATIENT:  JONATHAN CHILD  [id number]    CHIEF COMPLAINT:  Patient is a 65y old  Female who presents with a chief complaint of Fall from flight of stairs (2021 07:14)      INTERVAL HISTORY: No overnight events.    SUBJECTIVE: Denies chest pain, SOB or palpitations     REVIEW OF SYSTEMS:         General: No fevers, No chills, No malaise        HEENT: No headaches, No changes in vision, No dysphagia        CVS: No chest pain, No palpitations        Pulm: No SOB, No wheezing        GI: No abdominal pain, No nausea, No vomiting, No changes in bowel         : No dysuria         Ext: No edema, No claudications,    MEDICATIONS  (STANDING):  enoxaparin Injectable 40 milliGRAM(s) SubCutaneous every 24 hours  escitalopram 10 milliGRAM(s) Oral daily  levothyroxine 112 MICROGram(s) Oral daily  lidocaine   Patch 1 Patch Transdermal daily  metoprolol tartrate 50 milliGRAM(s) Oral every 12 hours  senna 2 Tablet(s) Oral at bedtime    MEDICATIONS  (PRN):  acetaminophen  IVPB .. 1000 milliGRAM(s) IV Intermittent every 6 hours PRN Mild Pain (1 - 3)  oxyCODONE    IR 5 milliGRAM(s) Oral every 4 hours PRN Moderate Pain (4 - 6)      OBJECTIVE:  ICU Vital Signs Last 24 Hrs  T(C): 37 (2021 04:25), Max: 37 (2021 04:25)  T(F): 98.6 (2021 04:25), Max: 98.6 (2021 04:25)  HR: 74 (2021 04:25) (66 - 89)  BP: 150/80 (2021 04:25) (110/72 - 150/80)  RR: 18 (2021 04:25) (16 - 18)  SpO2: 92% (2021 04:25) (91% - 96%)      I&O's Summary    2021 07:01  -  2021 07:00  --------------------------------------------------------  IN: 240 mL / OUT: 650 mL / NET: -410 mL    2021 07:01  -  2021 10:14  --------------------------------------------------------  IN: 0 mL / OUT: 200 mL / NET: -200 mL      Daily     Daily Weight in k (2021 07:39)    PHYSICAL EXAM:       General: NAD, lyng in bed       HEENT: orbital ecchymosis with edema       CVS: RRR, nl S1, S2, no murmurs, gallops, or regurg       Pulm: Lungs CTA b/l, no crackles, no wheezing, no rhonchi        GI: Nl BS, soft, nontender, nondistended, no masses       : No CVA tenderness       Ext: + Peripheral pulses, no edema, no cyanosis, no clubbing       Neuro: AOx2-3, no focal deficits       TELEMETRY: SR 55-65        LABS:                          9.5    10.30 )-----------( 171      ( 2021 05:11 )             28.8     04-26    143  |  108  |  17  ----------------------------<  132<H>  3.8   |  21<L>  |  0.43<L>    Ca    8.4      2021 05:11  Phos  2.6       Mg     2.1             PT/INR - ( 2021 05:49 )   PT: 12.3 sec;   INR: 1.03 ratio         PTT - ( 2021 05:49 )  PTT:25.4 sec

## 2021-04-26 NOTE — PROGRESS NOTE ADULT - ASSESSMENT
65F hx breast cancer s/p mastectomy, HTN, hypothryoidism, not on full anticoagulation/antiplatelets per EMS, presenting as a level 2 trauma activation after a fall from a flight of stairs found to have (1) SAH, (2) left-sided rib fractures, (3) left iliac wing fracture with hematoma, (4) left forehead laceration s/p successful closed reduction of left pinky PIP dislocation.      Plan:   - Neuro checks q 4hr  - Keppra 500 q 12hrs x 7 days  - Pain control with standing Tylenol, Lidoderm patch and Dilaudid prn. And added oxycodone 5mg q4hrs  - RD  - Incidental R renal lesion to be discussed  - Tertiary exam  - DVT ppx -> will require 6 weeks of prophylactic Lovenox per ortho attending consultation note  - PT recs: AR  - F/u Dr. Choe OP    ACS  [phone]

## 2021-04-26 NOTE — CONSULT NOTE ADULT - PROBLEM SELECTOR RECOMMENDATION 5
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 Hernández [phone]

## 2021-04-27 ENCOUNTER — TRANSCRIPTION ENCOUNTER (OUTPATIENT)
Age: 66
End: 2021-04-27

## 2021-04-27 ENCOUNTER — INPATIENT (INPATIENT)
Facility: HOSPITAL | Age: 66
LOS: 35 days | Discharge: ANOTHER HOSPITAL | DRG: 939 | End: 2021-06-02
Attending: PHYSICAL MEDICINE & REHABILITATION | Admitting: STUDENT IN AN ORGANIZED HEALTH CARE EDUCATION/TRAINING PROGRAM
Payer: MEDICARE

## 2021-04-27 VITALS
RESPIRATION RATE: 24 BRPM | TEMPERATURE: 100 F | HEART RATE: 74 BPM | OXYGEN SATURATION: 94 % | DIASTOLIC BLOOD PRESSURE: 65 MMHG | SYSTOLIC BLOOD PRESSURE: 100 MMHG

## 2021-04-27 VITALS
OXYGEN SATURATION: 95 % | DIASTOLIC BLOOD PRESSURE: 70 MMHG | WEIGHT: 136.69 LBS | HEIGHT: 67 IN | TEMPERATURE: 98 F | HEART RATE: 74 BPM | RESPIRATION RATE: 14 BRPM | SYSTOLIC BLOOD PRESSURE: 111 MMHG

## 2021-04-27 DIAGNOSIS — S02.2XXD FRACTURE OF NASAL BONES, SUBSEQUENT ENCOUNTER FOR FRACTURE WITH ROUTINE HEALING: ICD-10-CM

## 2021-04-27 DIAGNOSIS — R42 DIZZINESS AND GIDDINESS: ICD-10-CM

## 2021-04-27 DIAGNOSIS — R47.01 APHASIA: ICD-10-CM

## 2021-04-27 DIAGNOSIS — R26.9 UNSPECIFIED ABNORMALITIES OF GAIT AND MOBILITY: ICD-10-CM

## 2021-04-27 DIAGNOSIS — S32.302D UNSPECIFIED FRACTURE OF LEFT ILIUM, SUBSEQUENT ENCOUNTER FOR FRACTURE WITH ROUTINE HEALING: ICD-10-CM

## 2021-04-27 DIAGNOSIS — I95.9 HYPOTENSION, UNSPECIFIED: ICD-10-CM

## 2021-04-27 DIAGNOSIS — S42.002D FRACTURE OF UNSPECIFIED PART OF LEFT CLAVICLE, SUBSEQUENT ENCOUNTER FOR FRACTURE WITH ROUTINE HEALING: ICD-10-CM

## 2021-04-27 DIAGNOSIS — Z85.3 PERSONAL HISTORY OF MALIGNANT NEOPLASM OF BREAST: ICD-10-CM

## 2021-04-27 DIAGNOSIS — D52.9 FOLATE DEFICIENCY ANEMIA, UNSPECIFIED: ICD-10-CM

## 2021-04-27 DIAGNOSIS — I10 ESSENTIAL (PRIMARY) HYPERTENSION: ICD-10-CM

## 2021-04-27 DIAGNOSIS — S32.19XA OTHER FRACTURE OF SACRUM, INITIAL ENCOUNTER FOR CLOSED FRACTURE: ICD-10-CM

## 2021-04-27 DIAGNOSIS — B96.20 UNSPECIFIED ESCHERICHIA COLI [E. COLI] AS THE CAUSE OF DISEASES CLASSIFIED ELSEWHERE: ICD-10-CM

## 2021-04-27 DIAGNOSIS — K59.00 CONSTIPATION, UNSPECIFIED: ICD-10-CM

## 2021-04-27 DIAGNOSIS — S22.050A WEDGE COMPRESSION FRACTURE OF T5-T6 VERTEBRA, INITIAL ENCOUNTER FOR CLOSED FRACTURE: ICD-10-CM

## 2021-04-27 DIAGNOSIS — S22.20XD UNSPECIFIED FRACTURE OF STERNUM, SUBSEQUENT ENCOUNTER FOR FRACTURE WITH ROUTINE HEALING: ICD-10-CM

## 2021-04-27 DIAGNOSIS — S32.039D UNSPECIFIED FRACTURE OF THIRD LUMBAR VERTEBRA, SUBSEQUENT ENCOUNTER FOR FRACTURE WITH ROUTINE HEALING: ICD-10-CM

## 2021-04-27 DIAGNOSIS — S01.81XD LACERATION WITHOUT FOREIGN BODY OF OTHER PART OF HEAD, SUBSEQUENT ENCOUNTER: ICD-10-CM

## 2021-04-27 DIAGNOSIS — S02.85XD: ICD-10-CM

## 2021-04-27 DIAGNOSIS — R74.8 ABNORMAL LEVELS OF OTHER SERUM ENZYMES: ICD-10-CM

## 2021-04-27 DIAGNOSIS — E03.9 HYPOTHYROIDISM, UNSPECIFIED: ICD-10-CM

## 2021-04-27 DIAGNOSIS — S63.257D: ICD-10-CM
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
## 2021-04-27 DIAGNOSIS — S06.6X0A TRAUMATIC SUBARACHNOID HEMORRHAGE WITHOUT LOSS OF CONSCIOUSNESS, INITIAL ENCOUNTER: ICD-10-CM

## 2021-04-27 DIAGNOSIS — Z79.01 LONG TERM (CURRENT) USE OF ANTICOAGULANTS: ICD-10-CM

## 2021-04-27 DIAGNOSIS — R33.9 RETENTION OF URINE, UNSPECIFIED: ICD-10-CM

## 2021-04-27 DIAGNOSIS — S05.12XA CONTUSION OF EYEBALL AND ORBITAL TISSUES, LEFT EYE, INITIAL ENCOUNTER: ICD-10-CM
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
## 2021-04-27 DIAGNOSIS — S06.6X9A TRAUMATIC SUBARACHNOID HEMORRHAGE WITH LOSS OF CONSCIOUSNESS OF UNSPECIFIED DURATION, INITIAL ENCOUNTER: ICD-10-CM

## 2021-04-27 DIAGNOSIS — L22 DIAPER DERMATITIS: ICD-10-CM

## 2021-04-27 DIAGNOSIS — R07.89 OTHER CHEST PAIN: ICD-10-CM

## 2021-04-27 DIAGNOSIS — Z90.12 ACQUIRED ABSENCE OF LEFT BREAST AND NIPPLE: ICD-10-CM

## 2021-04-27 DIAGNOSIS — S01.81XA LACERATION WITHOUT FOREIGN BODY OF OTHER PART OF HEAD, INITIAL ENCOUNTER: ICD-10-CM

## 2021-04-27 DIAGNOSIS — E87.0 HYPEROSMOLALITY AND HYPERNATREMIA: ICD-10-CM

## 2021-04-27 DIAGNOSIS — R45.87 IMPULSIVENESS: ICD-10-CM

## 2021-04-27 DIAGNOSIS — Z51.89 ENCOUNTER FOR OTHER SPECIFIED AFTERCARE: ICD-10-CM

## 2021-04-27 DIAGNOSIS — N39.0 URINARY TRACT INFECTION, SITE NOT SPECIFIED: ICD-10-CM

## 2021-04-27 DIAGNOSIS — J96.00 ACUTE RESPIRATORY FAILURE, UNSPECIFIED WHETHER WITH HYPOXIA OR HYPERCAPNIA: ICD-10-CM

## 2021-04-27 DIAGNOSIS — S06.6X9D TRAUMATIC SUBARACHNOID HEMORRHAGE WITH LOSS OF CONSCIOUSNESS OF UNSPECIFIED DURATION, SUBSEQUENT ENCOUNTER: ICD-10-CM

## 2021-04-27 DIAGNOSIS — R00.2 PALPITATIONS: ICD-10-CM

## 2021-04-27 DIAGNOSIS — Z47.89 ENCOUNTER FOR OTHER ORTHOPEDIC AFTERCARE: ICD-10-CM

## 2021-04-27 DIAGNOSIS — N28.1 CYST OF KIDNEY, ACQUIRED: ICD-10-CM

## 2021-04-27 DIAGNOSIS — N31.9 NEUROMUSCULAR DYSFUNCTION OF BLADDER, UNSPECIFIED: ICD-10-CM

## 2021-04-27 DIAGNOSIS — E88.09 OTHER DISORDERS OF PLASMA-PROTEIN METABOLISM, NOT ELSEWHERE CLASSIFIED: ICD-10-CM

## 2021-04-27 DIAGNOSIS — E44.0 MODERATE PROTEIN-CALORIE MALNUTRITION: ICD-10-CM

## 2021-04-27 DIAGNOSIS — R41.89 OTHER SYMPTOMS AND SIGNS INVOLVING COGNITIVE FUNCTIONS AND AWARENESS: ICD-10-CM
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
## 2021-04-27 DIAGNOSIS — D72.829 ELEVATED WHITE BLOOD CELL COUNT, UNSPECIFIED: ICD-10-CM

## 2021-04-27 DIAGNOSIS — S22.43XD MULTIPLE FRACTURES OF RIBS, BILATERAL, SUBSEQUENT ENCOUNTER FOR FRACTURE WITH ROUTINE HEALING: ICD-10-CM

## 2021-04-27 DIAGNOSIS — I47.1 SUPRAVENTRICULAR TACHYCARDIA: ICD-10-CM

## 2021-04-27 DIAGNOSIS — M54.9 DORSALGIA, UNSPECIFIED: ICD-10-CM

## 2021-04-27 DIAGNOSIS — R45.1 RESTLESSNESS AND AGITATION: ICD-10-CM

## 2021-04-27 DIAGNOSIS — Z90.12 ACQUIRED ABSENCE OF LEFT BREAST AND NIPPLE: Chronic | ICD-10-CM
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
## 2021-04-27 LAB — SARS-COV-2 RNA SPEC QL NAA+PROBE: SIGNIFICANT CHANGE UP

## 2021-04-27 PROCEDURE — 99232 SBSQ HOSP IP/OBS MODERATE 35: CPT

## 2021-04-27 RX ORDER — METOPROLOL TARTRATE 50 MG
1 TABLET ORAL
Qty: 30 | Refills: 0
Start: 2021-04-27 | End: 2021-05-26

## 2021-04-27 RX ORDER — OXYCODONE HYDROCHLORIDE 5 MG/1
5 TABLET ORAL EVERY 4 HOURS
Refills: 0 | Status: DISCONTINUED | OUTPATIENT
Start: 2021-04-27 | End: 2021-05-03

## 2021-04-27 RX ORDER — SENNA PLUS 8.6 MG/1
2 TABLET ORAL AT BEDTIME
Refills: 0 | Status: DISCONTINUED | OUTPATIENT
Start: 2021-04-27 | End: 2021-06-02

## 2021-04-27 RX ORDER — LIDOCAINE 4 G/100G
1 CREAM TOPICAL DAILY
Refills: 0 | Status: DISCONTINUED | OUTPATIENT
Start: 2021-04-28 | End: 2021-06-02

## 2021-04-27 RX ORDER — POLYETHYLENE GLYCOL 3350 17 G/17G
17 POWDER, FOR SOLUTION ORAL DAILY
Refills: 0 | Status: DISCONTINUED | OUTPATIENT
Start: 2021-04-27 | End: 2021-05-03

## 2021-04-27 RX ORDER — ZINC OXIDE 200 MG/G
1 OINTMENT TOPICAL
Refills: 0 | Status: DISCONTINUED | OUTPATIENT
Start: 2021-04-27 | End: 2021-06-02

## 2021-04-27 RX ORDER — ACETAMINOPHEN 500 MG
650 TABLET ORAL EVERY 6 HOURS
Refills: 0 | Status: DISCONTINUED | OUTPATIENT
Start: 2021-04-27 | End: 2021-06-02

## 2021-04-27 RX ORDER — ESCITALOPRAM OXALATE 10 MG/1
10 TABLET, FILM COATED ORAL DAILY
Refills: 0 | Status: DISCONTINUED | OUTPATIENT
Start: 2021-04-28 | End: 2021-06-02

## 2021-04-27 RX ORDER — ENOXAPARIN SODIUM 100 MG/ML
40 INJECTION SUBCUTANEOUS EVERY 24 HOURS
Refills: 0 | Status: DISCONTINUED | OUTPATIENT
Start: 2021-04-28 | End: 2021-05-17

## 2021-04-27 RX ORDER — LEVOTHYROXINE SODIUM 125 MCG
112 TABLET ORAL DAILY
Refills: 0 | Status: DISCONTINUED | OUTPATIENT
Start: 2021-04-28 | End: 2021-06-02

## 2021-04-27 RX ORDER — ESCITALOPRAM OXALATE 10 MG/1
1 TABLET, FILM COATED ORAL
Qty: 0 | Refills: 0 | DISCHARGE
Start: 2021-04-27

## 2021-04-27 RX ORDER — OXYCODONE HYDROCHLORIDE 5 MG/1
10 TABLET ORAL EVERY 6 HOURS
Refills: 0 | Status: DISCONTINUED | OUTPATIENT
Start: 2021-04-27 | End: 2021-05-03

## 2021-04-27 RX ORDER — NYSTATIN CREAM 100000 [USP'U]/G
1 CREAM TOPICAL
Refills: 0 | Status: DISCONTINUED | OUTPATIENT
Start: 2021-04-27 | End: 2021-06-02

## 2021-04-27 RX ORDER — METOPROLOL TARTRATE 50 MG
100 TABLET ORAL DAILY
Refills: 0 | Status: DISCONTINUED | OUTPATIENT
Start: 2021-04-27 | End: 2021-04-28

## 2021-04-27 RX ADMIN — Medication 112 MICROGRAM(S): at 05:31

## 2021-04-27 RX ADMIN — LIDOCAINE 1 PATCH: 4 CREAM TOPICAL at 11:14

## 2021-04-27 RX ADMIN — SENNA PLUS 2 TABLET(S): 8.6 TABLET ORAL at 22:03

## 2021-04-27 RX ADMIN — ESCITALOPRAM OXALATE 10 MILLIGRAM(S): 10 TABLET, FILM COATED ORAL at 11:13

## 2021-04-27 RX ADMIN — Medication 50 MILLIGRAM(S): at 05:30

## 2021-04-27 RX ADMIN — ENOXAPARIN SODIUM 40 MILLIGRAM(S): 100 INJECTION SUBCUTANEOUS at 11:13

## 2021-04-27 NOTE — PROGRESS NOTE ADULT - SUBJECTIVE AND OBJECTIVE BOX
ACS DAILY PROGRESS NOTE:       SUBJECTIVE/ROS: No events overnight- awaiting rehab placement- Denies nausea, vomiting, chest pain, shortness of breath         MEDICATIONS  (STANDING):  enoxaparin Injectable 40 milliGRAM(s) SubCutaneous every 24 hours  escitalopram 10 milliGRAM(s) Oral daily  levothyroxine 112 MICROGram(s) Oral daily  lidocaine   Patch 1 Patch Transdermal daily  metoprolol tartrate 50 milliGRAM(s) Oral every 12 hours  senna 2 Tablet(s) Oral at bedtime    MEDICATIONS  (PRN):  acetaminophen  IVPB .. 1000 milliGRAM(s) IV Intermittent every 6 hours PRN Mild Pain (1 - 3)  oxyCODONE    IR 5 milliGRAM(s) Oral every 4 hours PRN Moderate Pain (4 - 6)      OBJECTIVE:    Vital Signs Last 24 Hrs  T(C): 36.8 (27 Apr 2021 04:37), Max: 36.8 (26 Apr 2021 12:11)  T(F): 98.3 (27 Apr 2021 04:37), Max: 98.3 (26 Apr 2021 12:11)  HR: 75 (27 Apr 2021 04:37) (67 - 75)  BP: 135/78 (27 Apr 2021 04:37) (127/81 - 142/84)  BP(mean): --  RR: 18 (27 Apr 2021 04:37) (18 - 18)  SpO2: 95% (27 Apr 2021 04:37) (91% - 95%)      I&O's Detail      26 Apr 2021 07:01  -  27 Apr 2021 07:00  --------------------------------------------------------  IN:    Oral Fluid: 342 mL  Total IN: 342 mL    OUT:    Voided (mL): 500 mL  Total OUT: 500 mL    Total NET: -158 mL      27 Apr 2021 07:01  -  27 Apr 2021 11:01  --------------------------------------------------------  IN:    Oral Fluid: 240 mL  Total IN: 240 mL    OUT:  Total OUT: 0 mL    Total NET: 240 mL              LABS:                                       9.5    10.30 )-----------( 171      ( 26 Apr 2021 05:11 )             28.8       04-26    143  |  108  |  17  ----------------------------<  132<H>  3.8   |  21<L>  |  0.43<L>    Ca    8.4      26 Apr 2021 05:11  Phos  2.6     04-26  Mg     2.1     04-26            CAPILLARY BLOOD GLUCOSE          Physical Exam:  GEN: resting in bed comfortably in NAD, orbital ecchymosis with edema  RESP: no increased WOB  ABD: soft, non-distended, non-tender without rebound tenderness or guarding  EXTR: warm, well-perfused without gross deformities; spontaneous movement in b/l U/L extrem  NEURO: AAOx2

## 2021-04-27 NOTE — PROGRESS NOTE ADULT - REASON FOR ADMISSION
Fall from flight of stairs

## 2021-04-27 NOTE — DISCHARGE NOTE NURSING/CASE MANAGEMENT/SOCIAL WORK - PATIENT PORTAL LINK FT
You can access the FollowMyHealth Patient Portal offered by [facility] by registering at the following website: [email or web address]. By joining "[employer]’s FollowMyHealth portal, you will also be able to view your health information using other applications (apps) compatible with our system.

## 2021-04-27 NOTE — H&P ADULT - NSHPSOCIALHISTORY_GEN_ALL_CORE
SOCIAL HISTORY  Smoking - Denied  EtOH - Denied   Drugs - Denied    FUNCTIONAL HISTORY  Pt resides in a  with her spouse and son. Ambulated with use of R/W/SC outside of the home. Has HHA for IADLS including grocery shopping and laundry, was (I) with all ADLS    CURRENT FUNCTIONAL STATUS  - Bed Mobility: modA  - Transfers: modA 2 person assist  - Gait: modA 2 person assist, 2 steps to HOB  - ADLs: maxA

## 2021-04-27 NOTE — PROGRESS NOTE ADULT - PROVIDER SPECIALTY LIST ADULT
Trauma Surgery
Electrophysiology
Electrophysiology
SICU
Trauma Surgery
Trauma Surgery
SICU
Trauma Surgery
Orthopedics
Trauma Surgery
Trauma Surgery

## 2021-04-27 NOTE — PROGRESS NOTE ADULT - TIME BILLING
reviewed in detail plan of care with house staff  patient is awake alert, breathing comfortable, pain well controled  at this stage will benefit from sub acute rehab placement which is arranged for today  reviewed discharge plan which would include discharge with Lovenox for VTE prophylaxis, and follow up with plastics
resting comfortable  stable from neurologic perspective consistent with mild TBI  multiple rib fractures - breathing comfortable with multimodal pain control    facilitating discharge planning which will include follow up with plastic surgery

## 2021-04-27 NOTE — DISCHARGE NOTE NURSING/CASE MANAGEMENT/SOCIAL WORK - NSDCFUADDAPPT_GEN_ALL_CORE_FT
Please follow up with Dr. Herrera regarding R kidney mass. You may need further testing. She should also be aware that we have changed your blood pressure medications. You were on amlodipine (Norvasc). Because you had a fast heart rhythm (SVT), you were started on metoprolol, which decreases heart rate and also lowers blood pressure. Your amlodipine was stopped.  Please follow up with Cardiology as an outpatient within 2 weeks (Dr. Hernández).  Continue Toprol  mg daily.    Please call Dr. Aquino's (Neurosurgery) office for an appointment to be seen 2 weeks after discharge for repeat imaging of your brain injury.    Please call Dr. Choe's (Plastic Surgery) office for an appointment to be seen within 1 week after discharge for your facial laceration with stitches, left pinky dislocation (PIP joint), and left nasal bone and orbital rim fractures.    If needed, you can follow up with Dr. Cardoza (Orthopedics) for your left pelvic (iliac wing) and left clavicle (collar bone) fractures.     If needed, you can follow up with Dr. Peña (Trauma Surgery) for your broken ribs.

## 2021-04-27 NOTE — H&P ADULT - REASON FOR ADMISSION
SAH and fractures of left-sided ribs, left iliac wing with hematoma, S/P closed reduction of left pinky PIP dislocation after trauma SAH and fractures of left-sided ribs, left iliac wing with hematoma, S/P closed reduction of left pinky PIP dislocation after trauma  2.2

## 2021-04-27 NOTE — PATIENT PROFILE ADULT - NSPRONUTRITIONRISK_GEN_A_NUR
Attempted to reach pt enrolled in the COVID-19 Home Symptom Tracker program at this time via all available numbers on pts chart without success. Message left with contact information to contact [facility] if needing to speak with a nurse and always dial 911 for emergencies.   No indicators present

## 2021-04-27 NOTE — PROGRESS NOTE ADULT - NSICDXPILOT_GEN_ALL_CORE
[place]

## 2021-04-27 NOTE — PROGRESS NOTE ADULT - ASSESSMENT
65F hx breast cancer s/p mastectomy, HTN, hypothryoidism, not on full anticoagulation/antiplatelets per EMS, presenting as a level 2 trauma activation after a fall from a flight of stairs found to have (1) SAH, (2) left-sided rib fractures, (3) left iliac wing fracture with hematoma, (4) left forehead laceration s/p successful closed reduction of left pinky PIP dislocation.      Plan:   - Neuro checks q 4hr  - Keppra 500 q 12hrs x 7 days  - Pain control with standing Tylenol, Lidoderm patch and Dilaudid prn. And added oxycodone 5mg q4hrs  - RD  - Incidental R renal lesion to be discussed  - Tertiary exam  - DVT ppx -> will require 6 weeks of prophylactic Lovenox per ortho attending consultation note  - PT recs: [facility]  - F/u Dr. Choe OP    ACS  [phone]

## 2021-04-27 NOTE — H&P ADULT - ATTENDING COMMENTS
Pt. seen 4/28 AM.  Agree with documentation above as per resident on call with amendments made as appropriate. Patient medically stable.     Pt. has not voided-- SC vols. 1823, 1900.  Does not get sensation to urinate.  + BM yesterday as per nursing notes.  +pain in left hip --able to tolerated therapy.  +low back pain.--worse with standing and ambulating.  Denies radiating pain into LE or numbness. Pt. awake, alert, confused.  follows commands.      Vital Signs Last 24 Hrs  T(C): 36.6 (28 Apr 2021 07:44), Max: 37.6 (27 Apr 2021 12:43)  T(F): 97.9 (28 Apr 2021 07:44), Max: 99.7 (27 Apr 2021 12:43)  HR: 60 (28 Apr 2021 07:44) (60 - 87)  BP: 115/78 (28 Apr 2021 07:44) (100/65 - 144/84)  BP(mean): --  RR: 14 (28 Apr 2021 08:16) (14 - 24)  SpO2: 96% (28 Apr 2021 08:16) (94% - 98%)    Physical Exam as amended above with changes as per this AM exam as below:  AAOx3  Recall: poor-- 1/3 after few min. even with cues  Attn: impaired-- able to complete days of week backward, unable to do serial 7's    No cervical pain or tenderness.    No thoracic tenderness  + lumbar paraspinal tenderness bilat.                          9.1    9.85  )-----------( 218      ( 28 Apr 2021 05:09 )             27.0   04-28    145  |  110<H>  |  16  ----------------------------<  125<H>  3.6   |  25  |  0.63    Ca    8.2<L>      28 Apr 2021 05:09    TPro  5.7<L>  /  Alb  2.6<L>  /  TBili  1.0  /  DBili  x   /  AST  21  /  ALT  29  /  AlkPhos  94  04-28      65y with PMHx breast Ca S/P mastectomy, HTN, hypothyroidism presented to [facility] 4/19/21 as level 2 trauma after falling down a flight of stairs. She was found to have bilateral frontoparietal SAH, L 3-8 and R 7th rib fractures with occult L pneumothorax, sternal fracture, L iliac wing fracture with hematoma S/P 1U pRBCs--WBAT Left LE, L forehead laceration S/P suture repair, L pinky PIP dislocation S/P closed reduction with buddy splint, L clavicle fracture-- NWB Left UE, L nasal bone and L orbital rim fractures, and sternal fracture. No surgical intervention.  Admitted to rehab with cognitive deficits, gait and ADL impairment.    Urinary retention-- monitor-- SC PRN, toileting program,    Lumbar pain-- STAT CT lumbar spine---   no spinal imaging at [facility]-- given pt's severity of urinary retention and lumbar pain-- STAT CT thoracic & lumbar spine ordered to r/o traumatic injury--     Therapy held until results obtained.      Bethanachol started for urinary retention.   Toileting program.     d/w hospitalist.      Left iliac fx-- WBAT LLE    NWB left UE    impulsivity/fall risk-- 2:1 enhanced supervision

## 2021-04-27 NOTE — H&P ADULT - ASSESSMENT
Assessment/Plan:  JONATHAN CHILD is a 65y with PMHx breast Ca S/P mastectomy, HTN, hypothyroidism presented to [facility] 4/19/21 as level 2 trauma after falling down a flight of stairs. She was found to have bilateral frontoparietal SAH, L 3-8 and R 7th rib fractures with occult L pneumothorax, sternal fracture, L iliac wing fracture with hematoma S/P 1U pRBCs, L forehead laceration S/P suture repair, L pinky PIP dislocation S/P closed reduction with buddy splint, L clavicle fracture, L nasal bone and L orbital rim fractures, and sternal fracture. No surgical intervention for any of the fractures. S/P 7 day course of Keppra for seizure ppx. Incidentally found to have R renal lesion. Ortho recs WBAT LLE, NWB LUE, but ok for ROM L elbow/wrist/hand and L shoulder pendulum exercises, sling for comfort.       Comprehensive Multidisciplinary Rehab Program:  - Start comprehensive rehab program, PT/OT/SLP 3 hours a day, 5 days a week.  - WBAT LLE, NWB LUE but ok to do ROM exercises for L elbow, wrist, hand and L shoulder pendulum exercises  - Precautions: falls, sternal     #SAH  - S/P Keppra 7 day course for seizure ppx  - Avoid NSAIDs  - F/u neurosurgery, Dr. Vikas Aquino, upon discharge    #Multiple fractures  - L 3-8 and R 7th rib fractures with occult L pneumothorax, sternal fracture, L iliac wing fracture with hematoma S/P 1U pRBCs, L forehead laceration S/P suture repair, L pinky PIP dislocation S/P closed reduction with buddy splint, L clavicle fracture, L nasal bone and L orbital rim fractures, and sternal fracture  - Sling to LUE for comfort. No lifting more than 1-3 pounds for 6 weeks.  - Continue Lovenox for 6 weeks total (4/22-6/3)  - Tylenol, Oxycodone PRN for pain control. Lidoderm to L chest wall.  - F/u plastic surgeon Dr. Nell Choe, for facial laceration, L pinky dislocation, L nasal bone and orbital rim fractures  - F/u ortho, Dr. Reji Cardoza, for L clavicle and L pelvic wing fractures  - F/u surgery, Dr. 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 [place] Hernández, outpatient in 3 months, may consider elective ablation.    #R kidney lesion  - F/u Dr. Herrera outpatient or PCP    #Hypothyroidism  - Continue Synthroid 112mcg daily    GI/Bowel:  - At risk for constipation due to neurologic diagnosis, immobility and/or medication use  - Senna QHS, Miralax PRN Daily    /Bladder:   - At risk for incontinence and retention due to neurologic diagnosis and limited mobility  - Currently patient voids independently  - Continue catheter/bladder nursing protocol with bladder scans on admission with straight cath for >400cc.  - Encourage timed voids every 4 hours while awake for independence and to promote continence during therapy.    Skin/Pressure Injury:   - At risk for pressure injury due to neurologic diagnosis and relative immobility.  - Skin assessment on admission: ***  - Monitor Incisions: L facial laceration  - Turn every 2 hours while in bed, air mattress  - Soft heel protectors  - Skin barrier cream as needed  - Nursing to monitor skin Qshift    DVT ppx:  - Lovenox  - SCDs  ---------------  Outpatient Follow-up (Specialty/Name of physician):  Silvina Herrera  INTERNAL MEDICINE  [address] [place] [address]  Phone: [phone]  Fax: [phone]  Follow Up Time: 1 week    Vikas Aquino)  Neurosurgery  [address] [facility], [address] [place] [address]  Phone: [phone]  Fax: [phone]  Follow Up Time: 2 weeks    Nell Choe)  Plastic Surgery  [address] [facility], [address]  [place], [place] [id number]  Phone: [phone]  Fax: [phone]  Follow Up Time: 1 week    Reji Cardoza)  Orthopaedic Surgery  [address] [facility], [address]  [place] [address]  Phone: [phone]  Fax: [phone]  Follow Up Time: Routine    Dino Peña)  Surgery; Surgical Critical Care  [address] [facility], [address]  [place] [address]  Phone: [phone]  Fax: [phone]  Follow Up Time: Routine    [place] Hernández (MD; PhD)  Cardiac Electrophysiology; Cardiovascular Disease; Internal Medicine  [facility] - Dept of Cardiology, [address] [place] [address]  Phone: [phone]  Fax: [phone]  --------------  Goals: Safe discharge to home  Estimated Length of Stay: 10-14 days  Rehab Potential: Good  Medical Prognosis: Good  Estimated Disposition: Home with Home Care  ---------------    PRESCREEN COMPARISON:  I have reviewed the prescreen information and I have found no relevant changes between the preadmission screening and my post admission evaluation.    RATIONALE FOR INPATIENT ADMISSION: Patient demonstrates the following:  [X] Medically appropriate for rehabilitation admission  [X] Has attainable rehab goals with an appropriate initial discharge plan  [X]Has rehabilitation potential (expected to make a significant improvement within a reasonable period of time)  [X] Requires close medical management by a rehab physician, rehab nursing care, Hospitalist and comprehensive interdisciplinary team (including PT, OT and/or SLP, Prosthetics and Orthotics)       Assessment/Plan:  JONATHAN CHILD is a 65y with PMHx breast Ca S/P mastectomy, HTN, hypothyroidism presented to [facility] 4/19/21 as level 2 trauma after falling down a flight of stairs. She was found to have bilateral frontoparietal SAH, L 3-8 and R 7th rib fractures with occult L pneumothorax, sternal fracture, L iliac wing fracture with hematoma S/P 1U pRBCs, L forehead laceration S/P suture repair, L pinky PIP dislocation S/P closed reduction with buddy splint, L clavicle fracture, L nasal bone and L orbital rim fractures, and sternal fracture. No surgical intervention for any of the fractures. S/P 7 day course of Keppra for seizure ppx. Had an episode of SVT S/P Adenosine, seen by EP and started on Metoprolol. Incidentally found to have R renal lesion. Ortho recs WBAT LLE, NWB LUE, but ok for ROM L elbow/wrist/hand and L shoulder pendulum exercises, sling for comfort.       Comprehensive Multidisciplinary Rehab Program:  - Start comprehensive rehab program, PT/OT/SLP 3 hours a day, 5 days a week.  - WBAT LLE, NWB LUE but ok to do ROM exercises for L elbow, wrist, hand and L shoulder pendulum exercises  - Precautions: falls, sternal     #SAH  - S/P Keppra 7 day course for seizure ppx  - Avoid NSAIDs  - F/u neurosurgery, Dr. Vikas Aquino, upon discharge    #Multiple fractures  - L 3-8 and R 7th rib fractures with occult L pneumothorax, sternal fracture, L iliac wing fracture with hematoma S/P 1U pRBCs, L forehead laceration S/P suture repair, L pinky PIP dislocation S/P closed reduction with buddy splint, L clavicle fracture, L nasal bone and L orbital rim fractures, and sternal fracture  - Sling to LUE for comfort. No lifting more than 1-3 pounds for 6 weeks.  - Continue Lovenox for 6 weeks total (4/22-6/3)  - Tylenol, Oxycodone PRN for pain control. Lidoderm to L chest wall.  - F/u plastic surgeon Dr. Nell Choe, for facial laceration, L pinky dislocation, L nasal bone and orbital rim fractures  - F/u ortho, Dr. Reji Cardoza, for L clavicle and L pelvic wing fractures  - F/u surgery, Dr. 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 [place] Hernández, outpatient in 3 months, may consider elective ablation.    #R kidney lesion  - F/u Dr. Herrera outpatient or PCP    #Hypothyroidism  - Continue Synthroid 112mcg daily    GI/Bowel:  - At risk for constipation due to neurologic diagnosis, immobility and/or medication use  - Senna QHS, Miralax PRN Daily    /Bladder:   - At risk for incontinence and retention due to neurologic diagnosis and limited mobility  - Currently patient voids independently  - Continue catheter/bladder nursing protocol with bladder scans on admission with straight cath for >400cc.  - Encourage timed voids every 4 hours while awake for independence and to promote continence during therapy.    Skin/Pressure Injury:   - At risk for pressure injury due to neurologic diagnosis and relative immobility.  - Skin assessment on admission: ***  - Monitor Incisions: L facial laceration  - Turn every 2 hours while in bed, air mattress  - Soft heel protectors  - Skin barrier cream as needed  - Nursing to monitor skin Qshift    DVT ppx:  - Lovenox  - SCDs  ---------------  Outpatient Follow-up (Specialty/Name of physician):  Silvina Herrera  INTERNAL MEDICINE  [address] [place] [address]  Phone: [phone]  Fax: [phone]  Follow Up Time: 1 week    Vikas Aquino)  Neurosurgery  [address] [facility], [address] [place] [address]  Phone: [phone]  Fax: [phone]  Follow Up Time: 2 weeks    Nell Choe)  Plastic Surgery  [address] [facility], [address]  [place], [place] [id number]  Phone: [phone]  Fax: [phone]  Follow Up Time: 1 week    Reji Cardoza)  Orthopaedic Surgery  [address] [facility], [address]  [place] [address]  Phone: [phone]  Fax: [phone]  Follow Up Time: Routine    Dino Peña (MD)  Surgery; Surgical Critical Care  [address] [facility], [address]  [place] [address]  Phone: [phone]  Fax: [phone]  Follow Up Time: Routine    [place] Hernández; PhD)  Cardiac Electrophysiology; Cardiovascular Disease; Internal Medicine  [facility] - Dept of Cardiology, [address] [place] [address]  Phone: [phone]  Fax: [phone]  --------------  Goals: Safe discharge to home  Estimated Length of Stay: 10-14 days  Rehab Potential: Good  Medical Prognosis: Good  Estimated Disposition: Home with Home Care  ---------------    PRESCREEN COMPARISON:  I have reviewed the prescreen information and I have found no relevant changes between the preadmission screening and my post admission evaluation.    RATIONALE FOR INPATIENT ADMISSION: Patient demonstrates the following:  [X] Medically appropriate for rehabilitation admission  [X] Has attainable rehab goals with an appropriate initial discharge plan  [X]Has rehabilitation potential (expected to make a significant improvement within a reasonable period of time)  [X] Requires close medical management by a rehab physician, rehab nursing care, Hospitalist and comprehensive interdisciplinary team (including PT, OT and/or SLP, Prosthetics and Orthotics)       Assessment/Plan:  JONATHAN CHILD is a 65y with PMHx breast Ca S/P mastectomy, HTN, hypothyroidism presented to [facility] 4/19/21 as level 2 trauma after falling down a flight of stairs. She was found to have bilateral frontoparietal SAH, L 3-8 and R 7th rib fractures with occult L pneumothorax, sternal fracture, L iliac wing fracture with hematoma S/P 1U pRBCs, L forehead laceration S/P suture repair, L pinky PIP dislocation S/P closed reduction with buddy splint, L clavicle fracture, L nasal bone and L orbital rim fractures, and sternal fracture. No surgical intervention for any of the fractures. S/P 7 day course of Keppra for seizure ppx. Had an episode of SVT S/P Adenosine, seen by EP and started on Metoprolol. Incidentally found to have R renal lesion. Ortho recs WBAT LLE, NWB LUE, but ok for ROM L elbow/wrist/hand and L shoulder pendulum exercises, sling for comfort.       Comprehensive Multidisciplinary Rehab Program:  - Start comprehensive rehab program, PT/OT/SLP 3 hours a day, 5 days a week.  - WBAT LLE, NWB LUE but ok to do ROM exercises for L elbow, wrist, hand and L shoulder pendulum exercises  - Precautions: falls, sternal     #SAH  - S/P Keppra 7 day course for seizure ppx  - Avoid NSAIDs  - F/u neurosurgery, Dr. Vikas Aquino, upon discharge    #Multiple fractures  - L 3-8 and R 7th rib fractures with occult L pneumothorax, sternal fracture, L iliac wing fracture with hematoma S/P 1U pRBCs, L forehead laceration S/P suture repair, L pinky PIP dislocation S/P closed reduction with buddy splint, L clavicle fracture, L nasal bone and L orbital rim fractures, and sternal fracture  - Sling to LUE for comfort. No lifting more than 1-3 pounds for 6 weeks.  - Continue Lovenox for 6 weeks total (4/22-6/3)  - Tylenol, Oxycodone PRN for pain control. Lidoderm to L chest wall.  - F/u plastic surgeon Dr. Nell Choe, for facial laceration, L pinky dislocation, L nasal bone and orbital rim fractures  - F/u ortho, Dr. Reji Cardoza, for L clavicle and L pelvic wing fractures  - F/u surgery, Dr. 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 [place] Hernández, outpatient in 3 months, may consider elective ablation.    #R kidney lesion  - F/u Dr. Herrera outpatient or PCP    #Hypothyroidism  - Continue Synthroid 112mcg daily    GI/Bowel:  - At risk for constipation due to neurologic diagnosis, immobility and/or medication use  - Senna QHS, Miralax PRN Daily    /Bladder:   - At risk for incontinence and retention due to neurologic diagnosis and limited mobility  - Currently patient voids independently  - Continue catheter/bladder nursing protocol with bladder scans on admission with straight cath for >400cc.  - Encourage timed voids every 4 hours while awake for independence and to promote continence during therapy.    Skin/Pressure Injury:   - At risk for pressure injury due to neurologic diagnosis and relative immobility.  - Skin assessment on admission: incontinence associated dermatitis at inner thighs, groin and buttocks. Start desitin and nystatin powder to affected areas.   - Monitor Incisions: L facial laceration  - Turn every 2 hours while in bed, air mattress  - Soft heel protectors  - Skin barrier cream as needed  - Nursing to monitor skin Qshift    DVT ppx:  - Lovenox  - SCDs  ---------------  Outpatient Follow-up (Specialty/Name of physician):  Silvina Herrera  INTERNAL MEDICINE  [address] [place] [address]  Phone: [phone]  Fax: [phone]  Follow Up Time: 1 week    Vikas Aquino)  Neurosurgery  [address] [facility], [address] [place] [address]  Phone: [phone]  Fax: [phone]  Follow Up Time: 2 weeks    Nell Choe)  Plastic Surgery  [address] [facility], [address]  [place], [place] [id number]  Phone: [phone]  Fax: [phone]  Follow Up Time: 1 week    Reji Cardoza)  Orthopaedic Surgery  [address] [facility], [address]  [place] [address]  Phone: [phone]  Fax: [phone]  Follow Up Time: Routine    Dino Peña (MD)  Surgery; Surgical Critical Care  [address] [facility], [address]  [place] [address]  Phone: [phone]  Fax: [phone]  Follow Up Time: Routine    [place] Hernández; PhD)  Cardiac Electrophysiology; Cardiovascular Disease; Internal Medicine  [facility] - Dept of Cardiology, [address] [place] [address]  Phone: [phone]  Fax: [phone]  --------------  Goals: Safe discharge to home  Estimated Length of Stay: 10-14 days  Rehab Potential: Good  Medical Prognosis: Good  Estimated Disposition: Home with Home Care  ---------------    PRESCREEN COMPARISON:  I have reviewed the prescreen information and I have found no relevant changes between the preadmission screening and my post admission evaluation.    RATIONALE FOR INPATIENT ADMISSION: Patient demonstrates the following:  [X] Medically appropriate for rehabilitation admission  [X] Has attainable rehab goals with an appropriate initial discharge plan  [X]Has rehabilitation potential (expected to make a significant improvement within a reasonable period of time)  [X] Requires close medical management by a rehab physician, rehab nursing care, Hospitalist and comprehensive interdisciplinary team (including PT, OT and/or SLP, Prosthetics and Orthotics)       Assessment/Plan:  JONATHAN CHILD is a 65y with PMHx breast Ca S/P mastectomy, HTN, hypothyroidism presented to [facility] 4/19/21 as level 2 trauma after falling down a flight of stairs. She was found to have bilateral frontoparietal SAH, L 3-8 and R 7th rib fractures with occult L pneumothorax, sternal fracture, L iliac wing fracture with hematoma S/P 1U pRBCs--WBAT Left LE, L forehead laceration S/P suture repair, L pinky PIP dislocation S/P closed reduction with buddy splint, L clavicle fracture-- NWB Left UE, L nasal bone and L orbital rim fractures, and sternal fracture. No surgical intervention.  Admitted to rehab with cognitive deficits, gait and ADL impairment.      Comprehensive Multidisciplinary Rehab Program:  - Start comprehensive rehab program, PT/OT/SLP 3 hours a day, 5 days a week.  - WBAT LLE, NWB LUE but ok to do ROM exercises for L elbow, wrist, hand and L shoulder pendulum exercises  - Precautions: falls, sternal     #SAH  - S/P Keppra 7 day course for seizure ppx  - Avoid NSAIDs  - F/u neurosurgery, Dr. Vikas Aquino, upon discharge    #Multiple fractures  - L 3-8 and R 7th rib fractures with occult L pneumothorax, sternal fracture, L iliac wing fracture with hematoma S/P 1U pRBCs, L forehead laceration S/P suture repair, L pinky PIP dislocation S/P closed reduction with buddy splint, L clavicle fracture, L nasal bone and L orbital rim fractures, and sternal fracture  - Sling to LUE for comfort. No lifting more than 1-3 pounds for 6 weeks.  - Continue Lovenox for 6 weeks total (4/22-6/3)  - Tylenol, Oxycodone PRN for pain control. Lidoderm to L chest wall.  - F/u plastic surgeon Dr. Nell Choe, for facial laceration, L pinky dislocation, L nasal bone and orbital rim fractures  - F/u ortho, Dr. Reji Cardoza, for L clavicle and L pelvic wing fractures  - F/u surgery, Dr. 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 [place] Hernández, outpatient in 3 months, may consider elective ablation.    #R kidney lesion  - F/u Dr. Herrera outpatient or PCP    #Hypothyroidism  - Continue Synthroid 112mcg daily    GI/Bowel:  - At risk for constipation due to neurologic diagnosis, immobility and/or medication use  - Senna QHS, Miralax PRN Daily    /Bladder:   - At risk for incontinence and retention due to neurologic diagnosis and limited mobility  - Currently patient voids independently  - Continue catheter/bladder nursing protocol with bladder scans on admission with straight cath for >400cc.  - Encourage timed voids every 4 hours while awake for independence and to promote continence during therapy.    Skin/Pressure Injury:   - At risk for pressure injury due to neurologic diagnosis and relative immobility.  - Skin assessment on admission: incontinence associated dermatitis at inner thighs, groin and buttocks. Start desitin and nystatin powder to affected areas.   - Monitor Incisions: L facial laceration  - Turn every 2 hours while in bed, air mattress  - Soft heel protectors  - Skin barrier cream as needed  - Nursing to monitor skin Qshift    DVT ppx:  - Lovenox  - SCDs  ---------------  Outpatient Follow-up (Specialty/Name of physician):  Silvina Herrera  INTERNAL MEDICINE  [address] [place] [address]  Phone: [phone]  Fax: [phone]  Follow Up Time: 1 week    Vikas Aquino)  Neurosurgery  [address] [facility], [address] [place] [address]  Phone: [phone]  Fax: [phone]  Follow Up Time: 2 weeks    Nell Choe)  Plastic Surgery  [address] [facility], [address]  [place], [place] [address]  Phone: [phone]  Fax: [phone]  Follow Up Time: 1 week    Reji Cardoza)  Orthopaedic Surgery  [address] [facility], [address]  [place] [address]  Phone: [phone]  Fax: [phone]  Follow Up Time: Routine    Dino Peña)  Surgery; Surgical Critical Care  [address] [facility], [address] [address]  Phone: [phone]  Fax: [phone]  Follow Up Time: Routine    [place] Hernández (MD; PhD)  Cardiac Electrophysiology; Cardiovascular Disease; Internal Medicine  [facility] - Dept of Cardiology, [address] [place] [address]  Phone: [phone]  Fax: [phone]  --------------  Goals: Safe discharge to home  Estimated Length of Stay: 10-14 days  Rehab Potential: Good  Medical Prognosis: Good  Estimated Disposition: Home with Home Care  ---------------    PRESCREEN COMPARISON:  I have reviewed the prescreen information and I have found no relevant changes between the preadmission screening and my post admission evaluation.    RATIONALE FOR INPATIENT ADMISSION: Patient demonstrates the following:  [X] Medically appropriate for rehabilitation admission  [X] Has attainable rehab goals with an appropriate initial discharge plan  [X]Has rehabilitation potential (expected to make a significant improvement within a reasonable period of time)  [X] Requires close medical management by a rehab physician, rehab nursing care, Hospitalist and comprehensive interdisciplinary team (including PT, OT and/or SLP, Prosthetics and Orthotics)

## 2021-04-27 NOTE — H&P ADULT - NSHPLABSRESULTS_GEN_ALL_CORE
RECENT LABS                        9.5    10.30 )-----------( 171      ( 26 Apr 2021 05:11 )             28.8     04-26    143  |  108  |  17  ----------------------------<  132<H>  3.8   |  21<L>  |  0.43<L>    Ca    8.4      26 Apr 2021 05:11  Phos  2.6     04-26  Mg     2.1     04-26      < from: CT Cervical Spine No Cont (04.21.20 @ 19:34) >    IMPRESSION: Mild degenerative changes. No acute fractures or dislocations.    < end of copied text >    < from: CT Head No Cont (04.21.20 @ 19:51) >    IMPRESSION: Trace left posterior frontal traumatic subarachnoid hemorrhage. Left parietal extracalvarial soft tissue swelling.    < end of copied text >    < from: Xray Pelvis AP only (04.21.20 @ 20:22) >    IMPRESSION:     No acute fracture or dislocation.  Degenerative changes of spine.  Spatulated right transverse process of the fifth lumbar spine representing transitional vertebra.    < end of copied text >    < from: Xray Elbow AP + Lateral, Left (04.21.20 @ 20:30) >    IMPRESSION:     Generalized osseous demineralization. There is an acute minimally displaced avulsion fracture of the olecranon with a large amount of overlying soft tissue swelling. The remaining left elbow is free of acute fracture or dislocation.     < end of copied text >    < from: US Abdomen Limited (ED) (04.19.21 @ 10:33) >    IMPRESSION: No free fluid    < end of copied text >    < from: CT Chest w/ IV Cont (04.19.21 @ 10:56) >    IMPRESSION:  1.  Multilevel bilateral rib fractures as described above.  2.  Tracecomplex fluid density collection in the left pleural space which may represent hemothorax in setting of trauma. Cannot exclude adjacent small lung contusion.  3.  Tiny pneumothorax seen at the left lung apex medially.  4.  Acute displaced and  comminuted fracture of the left iliac wing.  5.  Minimally displaced fracture of the sternum.  6.  Minimally displaced left clavicular fracture.  7.  Convex hypoattenuating lesion in the right renal midpole. Additional complex cyst in the left midpole with possible small intracystic enhancing mural component. Recommend renal ultrasound using color Doppler imaging to further evaluate.    < end of copied text >    < from: CT Head No Cont (04.19.21 @ 11:22) >    IMPRESSION:  CT head: Left frontal scalp hematoma and laceration. Nondisplaced fracture of the anterior-inferior aspect of the left frontal bone extending into the lateral aspect of the superior rim of the left orbit. No radiopaque foreign body. Small scattered areas of acute subarachnoid hemorrhage in the bilateral cerebral hemispheres.    CT cervical spine: No acute fracture or traumatic subluxation of the cervical spine. Small left hemopneumothorax. Displaced fractures of the left posterior third and fifth ribs.    CT maxillofacial bones: Left periorbital soft tissue hematoma. Nondisplaced fracture of the left nasal bone. Nondisplaced fracture of the lateral aspect of the superior rim of the left orbit. No radiopaque foreign body. Intraorbital contents are intact.    < end of copied text >      < from: Xray Pelvis 3 Views (04.19.21 @ 14:00) >    IMPRESSION:  Slightly comminuted and impacted peripheral upper left iliac wing fracture (Duverney type). Otherwise no evidence for pelvic instability. No sacroiliac or pubic symphysis diastases or malalignment.    < end of copied text >    < from: Xray Hand 3 Views, Left (04.19.21 @ 16:02) >        < end of copied text >

## 2021-04-27 NOTE — H&P ADULT - HISTORY OF PRESENT ILLNESS
65F PMHx breast cancer s/p mastectomy, HTN, hypothyroidism, not on full anticoagulation/antiplatelets per EMS, presented to [facility] 4/19/21as a level 2 trauma activation after a fall from a flight of stairs. Patient only able to say "ow" in trauma bay, unable to provide further history. However per son, normal mentation at baseline. In the trauma bay, airway was intact with bilateral breath sounds, O2 saturation 100% on room air. Initially blood pressure was 140s systolic however on repeat 90s. NS@100 initiated. Secondary significant for left forehead laceration, left chest wall tenderness, left pelvic tenderness.     Ms. Arthur was hypotensive in the trauma bay and was transferred to the Surgical ICU after imaging was obtained. Her injury list is below.  (1) SAH, bilateral: stable on repeat imaging. Seven day course of Keppra for post-traumatic seizure prophylaxis.  (2) left 3-8 and right 7th rib fractures with occult left pneumothorax: pain control  (3) left iliac wing fracture with hematoma: required transfusion early in her ICU course, after which CBC were stable  (4) left forehead laceration s/p suture repair  (5) s/p successful closed reduction of left pinky PIP dislocation with delfin splint  (6) left clavicle fracture  (7) left nasal bone and left orbital rim fractures  (8) sternal fracture  Incidental findings: right renal lesion. Pt's  was notified and copy of the report given to him.     The patient was admitted to Trauma Surgery under SICU care.  Ortho was consulted for L. iliac wing fx, sternal fx, L. clavicle fx- recommended WBAT of the affected left lower extremity with walker, WBAT L shoulder, pendulum exercises okay, no lifting, sling for comfort, Encourage ROM of elbow/wrist/hand, PT/OT consult, NO urgent orthopedic surgical intervention at this time.    Neurosurgery consulted- recommended repeat CTH, which was stable.     PRS was consulted for non displaced left superior orbital rim and non displaced left nasal bone fracture--> No indication for operative fixation of these non displaced fractures; recommend ice to face to aid in swelling and analgesia.    She was placed on hemorrhage watch in the SICU given pelvic fracture and surrounding hematoma.  Behavior health was consulted for delirium.    Hand was consulted for L. pinky PIP dislocation- s/p closed reduction, rec Cont delfin taping for now.    4/21- CTH stable  - Added home lexapro  - Added NS @ 50cc/hr to supplement poor PO intake  - Lactate cleared (1.9)    4/22- Transfused 1u pRBC, Hct 20.9 -> 26.9  - Went into SVT, given adenosine x1 and started on metoprolol 25mg q12hr  - Added oxycodone 5mg q4hrs prn for increased pain control  - PO intake improved, IVL    4/23- EP was consulted for SVT, recc continue lopressor 25mg   Metoprolol PO increased from 25mg q12hrs to 25mg q6hrs.   - Added salt tabs 1g q8hrs and started on NS @ 50cc/hr for hyponatremia  - Spangler discontinued, passed TOV  - Added lovenox  - Cervical collar cleared by confrontational exam     4/24- Patient transferred from SICU to surgical floor in stable condition.  Discharge planning to acute rehab.     Stable from neurologic perspective consistent with mild TBI  Multiple rib fractures - breathing comfortable with multimodal pain control    On day of discharge, the patient was tolerating diet, working with PT well and pain controlled. The patient was medically stable for discharge to acute rehab with instructions for outpatient follow up 4/27/21.

## 2021-04-27 NOTE — H&P ADULT - NSHPPHYSICALEXAM_GEN_ALL_CORE
Vital Signs  T(C): 37.6 (04-27-21 @ 12:43), Max: 37.6 (04-27-21 @ 12:43)  HR: 74 (04-27-21 @ 12:43) (67 - 75)  BP: 100/65 (04-27-21 @ 12:43) (100/65 - 142/84)  RR: 24 (04-27-21 @ 12:43) (18 - 24)  SpO2: 94% (04-27-21 @ 12:43) (93% - 95%)    Gen - NAD, Comfortable  HEENT - NCAT, EOMI, MMM  Neck - Supple, No limited ROM  Pulm - CTAB, No wheeze, No rhonchi, No crackles  Cardiovascular - RRR, S1S2, No m/r/g  Abdomen - Soft, NT/ND, +BS  Extremities - No C/C/E, No calf tenderness  Neuro-     Cognitive - AAOx3     Communication - Fluent, No dysarthria     Attention: Intact      Judgement: Good evidence of judgement     Memory: Recall 3 objects immediate and 3 min later         Cranial Nerves - CN 2-12 intact     Motor -                    LEFT    UE - ShAB 5/5, EF 5/5, EE 5/5, WE 5/5,  5/5                    RIGHT UE - ShAB 5/5, EF 5/5, EE 5/5, WE 5/5,  5/5                    LEFT    LE - HF 5/5, KE 5/5, DF 5/5, PF 5/5                    RIGHT LE - HF 5/5, KE 5/5, DF 5/5, PF 5/5        Sensory - Intact to LT     Reflexes - DTR Intact, No primitive reflex     Coordination - FTN intact     Tone - normal  Psychiatric - Mood stable, Affect WNL  Skin: Intact  Wounds: None Present Vital Signs  T(C): 37.6 (04-27-21 @ 12:43), Max: 37.6 (04-27-21 @ 12:43)  HR: 74 (04-27-21 @ 12:43) (67 - 75)  BP: 100/65 (04-27-21 @ 12:43) (100/65 - 142/84)  RR: 24 (04-27-21 @ 12:43) (18 - 24)  SpO2: 94% (04-27-21 @ 12:43) (93% - 95%)    Gen - NAD, Comfortable  HEENT - periorbital ecchymoses, EOMI, MMM  Neck - Supple, No limited ROM  Pulm - CTAB, No wheeze  Cardiovascular - RRR, S1S2, No m/r/g  Abdomen - Soft, NT/ND, +BS  Extremities - No C/C/E, No calf tenderness  Neuro-     Cognitive - AAOx2 to person and year. States it is June, and unsure of location.     Communication - Fluent, delayed. Able to repeat.      Attention: Impaired. Requires frequent repetition and cueing.      Memory: Recall 3/3 objects immediate and 0/3 5 min later         Cranial Nerves - CN 2-12 intact     Motor -                    LEFT    UE - Limited due to left clavicle fracture. Able to wiggle fingers and extend wrist.                     RIGHT UE - ShAB 5/5, EF 5/5, EE 5/5, WE 5/5,  5/5                    LEFT    LE - HF limited due to pain, KE 5/5, DF 5/5, PF 5/5                    RIGHT LE - HF 5/5, KE 5/5, DF 5/5, PF 5/5        Sensory - Intact to LT     Reflexes - DTR Intact, No primitive reflex     Coordination - FTN intact     Tone - normal  Psychiatric - Mood stable, Affect WNL  Skin: scattered ecchymoses all over body in various states of healing.   Wounds: blanachable irritation along inner thighs and buttocks. Vital Signs  T(C): 37.6 (04-27-21 @ 12:43), Max: 37.6 (04-27-21 @ 12:43)  HR: 74 (04-27-21 @ 12:43) (67 - 75)  BP: 100/65 (04-27-21 @ 12:43) (100/65 - 142/84)  RR: 24 (04-27-21 @ 12:43) (18 - 24)  SpO2: 94% (04-27-21 @ 12:43) (93% - 95%)    Gen - NAD, Comfortable  HEENT - periorbital ecchymoses, EOMI, MMM  Neck - No cervical tenderness  Pulm - CTAB, No wheeze  Cardiovascular - RRR, S1S2, No m/r/g  Abdomen - Soft, NT/ND, +BS  Extremities - No C/C/E, No calf tenderness  Neuro-     Cognitive - AAOx2 to person and year. States it is June, and unsure of location.     Communication - Fluent, delayed. Able to repeat.      Attention: Impaired. Requires frequent repetition and cueing.      Memory: Recall 3/3 objects immediate and 0/3 5 min later         Cranial Nerves - CN 2-12 intact     Motor -                    LEFT    UE - Limited due to left clavicle fracture. Able to wiggle fingers and extend wrist and bend elbow about 3-4/5.                     RIGHT UE - ShAB 5/5, EF 5/5, EE 5/5, WE 5/5,  5/5                    LEFT    LE - HF limited due to pain--antigravity strength, KE 5/5, DF 5/5, PF 5/5                    RIGHT LE - HF 5/5, KE 5/5, DF 5/5, PF 5/5        Sensory - Intact to LT     Reflexes - DTR Intact, No primitive reflex     Coordination - FTN intact     Tone - normal  Psychiatric - Mood stable, Affect WNL  Skin: scattered ecchymoses all over body in various states of healing.   Wounds: blanachable irritation along inner thighs and buttocks.

## 2021-04-27 NOTE — H&P ADULT - NSHPREVIEWOFSYSTEMS_GEN_ALL_CORE
REVIEW OF SYSTEMS  Constitutional: No fever, No Chills, No fatigue  HEENT: No eye pain, No visual disturbances, No difficulty hearing, No Dysphagia   Pulm: No cough,  No shortness of breath  Cardio: No chest pain, No palpitations  GI:  No abdominal pain, No nausea, No vomiting, No diarrhea, No constipation, No incontinence   : No dysuria, No frequency, No hematuria, No incontinence  Neuro: No headaches, No memory loss, +loss of strength, No numbness, No tremors  Skin: No itching, No rashes, No lesions   Endo: No temperature intolerance  MSK: +pain  Psych:  No depression, No anxiety REVIEW OF SYSTEMS  Constitutional: No fever, No Chills, No fatigue  HEENT: No eye pain, No visual disturbances, No difficulty hearing, No Dysphagia   Pulm: No cough,  No shortness of breath  Cardio: No chest pain, No palpitations  GI:  No abdominal pain, No nausea, No vomiting, No diarrhea, No constipation  : No dysuria, No frequency, No hematuria  Neuro: No headaches, +memory loss, +loss of strength, No numbness, No tremors  Skin: No itching, No rashes, No lesions   Endo: No temperature intolerance  MSK: +pain  Psych:  No depression, No anxiety REVIEW OF SYSTEMS  Constitutional: No fever, No Chills, No fatigue  HEENT: + eye pain, No visual disturbances, No difficulty hearing, No Dysphagia   Pulm: No cough,  No shortness of breath  Cardio: No chest pain, No palpitations  GI:  No abdominal pain, No nausea, No vomiting, No diarrhea, No constipation  : No dysuria, No frequency, No hematuria  Neuro: No headaches, +memory loss, +loss of strength, No numbness, No tremors  Skin: No itching, No rashes, No lesions   Endo: No temperature intolerance  MSK: +pain  Psych:  No depression, No anxiety

## 2021-04-28 LAB
ALBUMIN SERPL ELPH-MCNC: 2.6 G/DL — LOW (ref 3.3–5)
ALP SERPL-CCNC: 94 U/L — SIGNIFICANT CHANGE UP (ref 40–120)
ALT FLD-CCNC: 29 U/L — SIGNIFICANT CHANGE UP (ref 10–45)
ANION GAP SERPL CALC-SCNC: 10 MMOL/L — SIGNIFICANT CHANGE UP (ref 5–17)
APPEARANCE UR: ABNORMAL
AST SERPL-CCNC: 21 U/L — SIGNIFICANT CHANGE UP (ref 10–40)
BACTERIA # UR AUTO: ABNORMAL /HPF
BASOPHILS # BLD AUTO: 0.02 K/UL — SIGNIFICANT CHANGE UP (ref 0–0.2)
BASOPHILS NFR BLD AUTO: 0.2 % — SIGNIFICANT CHANGE UP (ref 0–2)
BILIRUB SERPL-MCNC: 1 MG/DL — SIGNIFICANT CHANGE UP (ref 0.2–1.2)
BILIRUB UR-MCNC: NEGATIVE — SIGNIFICANT CHANGE UP
BUN SERPL-MCNC: 16 MG/DL — SIGNIFICANT CHANGE UP (ref 7–23)
CALCIUM SERPL-MCNC: 8.2 MG/DL — LOW (ref 8.4–10.5)
CHLORIDE SERPL-SCNC: 110 MMOL/L — HIGH (ref 96–108)
CO2 SERPL-SCNC: 25 MMOL/L — SIGNIFICANT CHANGE UP (ref 22–31)
COLOR SPEC: YELLOW — SIGNIFICANT CHANGE UP
CORTIS AM PEAK SERPL-MCNC: 20.8 UG/DL — HIGH (ref 6–18.4)
COVID-19 SPIKE DOMAIN AB INTERP: POSITIVE
COVID-19 SPIKE DOMAIN ANTIBODY RESULT: >250 U/ML — HIGH
CREAT SERPL-MCNC: 0.63 MG/DL — SIGNIFICANT CHANGE UP (ref 0.5–1.3)
DIFF PNL FLD: ABNORMAL
EOSINOPHIL # BLD AUTO: 0.14 K/UL — SIGNIFICANT CHANGE UP (ref 0–0.5)
EOSINOPHIL NFR BLD AUTO: 1.4 % — SIGNIFICANT CHANGE UP (ref 0–6)
EPI CELLS # UR: SIGNIFICANT CHANGE UP
FSH SERPL-MCNC: 10.7 IU/L — SIGNIFICANT CHANGE UP
GLUCOSE SERPL-MCNC: 125 MG/DL — HIGH (ref 70–99)
GLUCOSE UR QL: NEGATIVE — SIGNIFICANT CHANGE UP
HCT VFR BLD CALC: 27 % — LOW (ref 34.5–45)
HGB BLD-MCNC: 9.1 G/DL — LOW (ref 11.5–15.5)
IMM GRANULOCYTES NFR BLD AUTO: 1.4 % — SIGNIFICANT CHANGE UP (ref 0–1.5)
KETONES UR-MCNC: NEGATIVE — SIGNIFICANT CHANGE UP
LEUKOCYTE ESTERASE UR-ACNC: ABNORMAL
LH SERPL-ACNC: 6.5 IU/L — SIGNIFICANT CHANGE UP
LYMPHOCYTES # BLD AUTO: 1.54 K/UL — SIGNIFICANT CHANGE UP (ref 1–3.3)
LYMPHOCYTES # BLD AUTO: 15.6 % — SIGNIFICANT CHANGE UP (ref 13–44)
MCHC RBC-ENTMCNC: 30.5 PG — SIGNIFICANT CHANGE UP (ref 27–34)
MCHC RBC-ENTMCNC: 33.7 GM/DL — SIGNIFICANT CHANGE UP (ref 32–36)
MCV RBC AUTO: 90.6 FL — SIGNIFICANT CHANGE UP (ref 80–100)
MONOCYTES # BLD AUTO: 0.65 K/UL — SIGNIFICANT CHANGE UP (ref 0–0.9)
MONOCYTES NFR BLD AUTO: 6.6 % — SIGNIFICANT CHANGE UP (ref 2–14)
NEUTROPHILS # BLD AUTO: 7.36 K/UL — SIGNIFICANT CHANGE UP (ref 1.8–7.4)
NEUTROPHILS NFR BLD AUTO: 74.8 % — SIGNIFICANT CHANGE UP (ref 43–77)
NITRITE UR-MCNC: POSITIVE
NRBC # BLD: 0 /100 WBCS — SIGNIFICANT CHANGE UP (ref 0–0)
PH UR: 6.5 — SIGNIFICANT CHANGE UP (ref 5–8)
PLATELET # BLD AUTO: 218 K/UL — SIGNIFICANT CHANGE UP (ref 150–400)
POTASSIUM SERPL-MCNC: 3.6 MMOL/L — SIGNIFICANT CHANGE UP (ref 3.5–5.3)
POTASSIUM SERPL-SCNC: 3.6 MMOL/L — SIGNIFICANT CHANGE UP (ref 3.5–5.3)
PROT SERPL-MCNC: 5.7 G/DL — LOW (ref 6–8.3)
PROT UR-MCNC: 30 MG/DL
RBC # BLD: 2.98 M/UL — LOW (ref 3.8–5.2)
RBC # FLD: 15.2 % — HIGH (ref 10.3–14.5)
RBC CASTS # UR COMP ASSIST: SIGNIFICANT CHANGE UP /HPF (ref 0–4)
SARS-COV-2 IGG+IGM SERPL QL IA: >250 U/ML — HIGH
SARS-COV-2 IGG+IGM SERPL QL IA: POSITIVE
SODIUM SERPL-SCNC: 145 MMOL/L — SIGNIFICANT CHANGE UP (ref 135–145)
SP GR SPEC: 1.01 — SIGNIFICANT CHANGE UP (ref 1.01–1.02)
T4 FREE SERPL-MCNC: 1.2 NG/DL — SIGNIFICANT CHANGE UP (ref 0.9–1.8)
TSH SERPL-MCNC: 3.89 UIU/ML — SIGNIFICANT CHANGE UP (ref 0.27–4.2)
UROBILINOGEN FLD QL: NEGATIVE — SIGNIFICANT CHANGE UP
WBC # BLD: 9.85 K/UL — SIGNIFICANT CHANGE UP (ref 3.8–10.5)
WBC # FLD AUTO: 9.85 K/UL — SIGNIFICANT CHANGE UP (ref 3.8–10.5)
WBC UR QL: SIGNIFICANT CHANGE UP /HPF (ref 0–5)

## 2021-04-28 PROCEDURE — 72128 CT CHEST SPINE W/O DYE: CPT | Mod: 26

## 2021-04-28 PROCEDURE — 72131 CT LUMBAR SPINE W/O DYE: CPT | Mod: 26

## 2021-04-28 PROCEDURE — 99223 1ST HOSP IP/OBS HIGH 75: CPT

## 2021-04-28 RX ORDER — METOPROLOL TARTRATE 50 MG
100 TABLET ORAL DAILY
Refills: 0 | Status: DISCONTINUED | OUTPATIENT
Start: 2021-04-28 | End: 2021-05-24

## 2021-04-28 RX ORDER — BETHANECHOL CHLORIDE 25 MG
5 TABLET ORAL
Refills: 0 | Status: DISCONTINUED | OUTPATIENT
Start: 2021-04-28 | End: 2021-05-05

## 2021-04-28 RX ADMIN — ENOXAPARIN SODIUM 40 MILLIGRAM(S): 100 INJECTION SUBCUTANEOUS at 06:06

## 2021-04-28 RX ADMIN — NYSTATIN CREAM 1 APPLICATION(S): 100000 CREAM TOPICAL at 17:10

## 2021-04-28 RX ADMIN — ESCITALOPRAM OXALATE 10 MILLIGRAM(S): 10 TABLET, FILM COATED ORAL at 11:34

## 2021-04-28 RX ADMIN — Medication 5 MILLIGRAM(S): at 17:11

## 2021-04-28 RX ADMIN — Medication 112 MICROGRAM(S): at 06:07

## 2021-04-28 RX ADMIN — Medication 100 MILLIGRAM(S): at 06:07

## 2021-04-28 RX ADMIN — LIDOCAINE 1 PATCH: 4 CREAM TOPICAL at 11:34

## 2021-04-28 RX ADMIN — SENNA PLUS 2 TABLET(S): 8.6 TABLET ORAL at 21:35

## 2021-04-28 RX ADMIN — ZINC OXIDE 1 APPLICATION(S): 200 OINTMENT TOPICAL at 06:07

## 2021-04-28 RX ADMIN — ZINC OXIDE 1 APPLICATION(S): 200 OINTMENT TOPICAL at 17:14

## 2021-04-28 RX ADMIN — NYSTATIN CREAM 1 APPLICATION(S): 100000 CREAM TOPICAL at 06:07

## 2021-04-28 RX ADMIN — LIDOCAINE 1 PATCH: 4 CREAM TOPICAL at 19:59

## 2021-04-28 RX ADMIN — LIDOCAINE 1 PATCH: 4 CREAM TOPICAL at 23:04

## 2021-04-28 NOTE — DIETITIAN INITIAL EVALUATION ADULT. - REASON FOR ADMISSION
SAH and fractures of left-sided ribs, left iliac wing with hematoma, S/P closed reduction of left pinky PIP dislocation after trauma

## 2021-04-28 NOTE — DIETITIAN INITIAL EVALUATION ADULT. - OTHER INFO
65y old  Female who presents with a chief complaint of SAH and fractures of left-sided ribs, left iliac wing with hematoma after fall. PMHx breast cancer s/p mastectomy, HTN, hypothyroidism. She has difficulty feeding herself due to cervical collar, facial injuries and requires assistance at meals. Writer met with patient who reports “good” when asked about her appetite, and nearly 100% of her kosher meal was consumed. Patient not appropriate for diet education at this time due to mentation as she has some confusion per interdisciplinary rounds. Per weight history, 146# (1/31/19), 150# (4/21/20), and 131# (4/19/21) and admission weight 133#. Will recommend an Ensure Enlive BID to aid in healing as patient requires increased nutrient needs.

## 2021-04-28 NOTE — DIETITIAN INITIAL EVALUATION ADULT. - ADD RECOMMEND
1. Continue diet as ordered. Patient follows Kosher diet. 2. Provide Ensure Enlive BID 3. Recommend daily MVI with minerals. 4. Provide feeding assistance at meals

## 2021-04-28 NOTE — DIETITIAN INITIAL EVALUATION ADULT. - PERTINENT LABORATORY DATA
04-28    145  |  110<H>  |  16  ----------------------------<  125<H>  3.6   |  25  |  0.63    Ca    8.2<L>      28 Apr 2021 05:09    TPro  5.7<L>  /  Alb  2.6<L>  /  TBili  1.0  /  DBili  x   /  AST  21  /  ALT  29  /  AlkPhos  94  04-28

## 2021-04-28 NOTE — DIETITIAN INITIAL EVALUATION ADULT. - PERTINENT MEDS FT
MEDICATIONS  (STANDING):  bethanechol 5 milliGRAM(s) Oral two times a day  enoxaparin Injectable 40 milliGRAM(s) SubCutaneous every 24 hours  escitalopram 10 milliGRAM(s) Oral daily  levothyroxine 112 MICROGram(s) Oral daily  lidocaine   Patch 1 Patch Transdermal daily  metoprolol succinate  milliGRAM(s) Oral daily  nystatin Powder 1 Application(s) Topical two times a day  senna 2 Tablet(s) Oral at bedtime  zinc oxide 40% Ointment 1 Application(s) Topical two times a day    MEDICATIONS  (PRN):  acetaminophen   Tablet .. 650 milliGRAM(s) Oral every 6 hours PRN Temp greater or equal to 38C (100.4F), Mild Pain (1 - 3)  oxyCODONE    IR 5 milliGRAM(s) Oral every 4 hours PRN Moderate Pain (4 - 6)  oxyCODONE    IR 10 milliGRAM(s) Oral every 6 hours PRN Severe Pain (7 - 10)  polyethylene glycol 3350 17 Gram(s) Oral daily PRN Constipation

## 2021-04-28 NOTE — CHART NOTE - NSCHARTNOTEFT_GEN_A_CORE
CT Lumbar and Thoracic Spine reviewed-- Acute Fracture at the right superior endplate of L3 vertebral body and also age indeterminate T5 compression fracture vertebral body    Consulted Orthopedics  --Spoke with Dr. Rene Dunn (covering for Dr. Cardoza--who saw pt. at [facility]) and also spoke with Dr. Osborne on Ortho call at [facility]--  With acute urinary retention and decrease in motor strength of left hip-- would need MRI to r/o any spinal nerve compression.    Dr. Osborne  recommends TLSO brace -- will contact Orthotist to see pt. for it tomorrow.      MRI Lumbar spine ordered-- Spoke with pt's son Sebastian at bedside to review MRI form and confirm pt. has no contraindications to MRI.  Pt. had an MRI within the last 6 months.    Spoke with Sebastian at bedside  to update on pt's status, Fracture, MRI and rehab plan of care.  All questions answered    Attempted to contact pt's Spouse, Silverio, on the phone but no answer.

## 2021-04-29 ENCOUNTER — APPOINTMENT (OUTPATIENT)
Dept: MRI IMAGING | Facility: HOSPITAL | Age: 66
End: 2021-04-29

## 2021-04-29 LAB
ALBUMIN SERPL ELPH-MCNC: 2.9 G/DL — LOW (ref 3.3–5)
ALP SERPL-CCNC: 136 U/L — HIGH (ref 40–120)
ALT FLD-CCNC: 30 U/L — SIGNIFICANT CHANGE UP (ref 10–45)
ANION GAP SERPL CALC-SCNC: 11 MMOL/L — SIGNIFICANT CHANGE UP (ref 5–17)
AST SERPL-CCNC: 16 U/L — SIGNIFICANT CHANGE UP (ref 10–40)
BASOPHILS # BLD AUTO: 0.02 K/UL — SIGNIFICANT CHANGE UP (ref 0–0.2)
BASOPHILS NFR BLD AUTO: 0.1 % — SIGNIFICANT CHANGE UP (ref 0–2)
BILIRUB SERPL-MCNC: 1.3 MG/DL — HIGH (ref 0.2–1.2)
BUN SERPL-MCNC: 14 MG/DL — SIGNIFICANT CHANGE UP (ref 7–23)
CALCIUM SERPL-MCNC: 8.5 MG/DL — SIGNIFICANT CHANGE UP (ref 8.4–10.5)
CHLORIDE SERPL-SCNC: 106 MMOL/L — SIGNIFICANT CHANGE UP (ref 96–108)
CO2 SERPL-SCNC: 24 MMOL/L — SIGNIFICANT CHANGE UP (ref 22–31)
CREAT SERPL-MCNC: 0.51 MG/DL — SIGNIFICANT CHANGE UP (ref 0.5–1.3)
EOSINOPHIL # BLD AUTO: 0.16 K/UL — SIGNIFICANT CHANGE UP (ref 0–0.5)
EOSINOPHIL NFR BLD AUTO: 1.1 % — SIGNIFICANT CHANGE UP (ref 0–6)
FOLATE SERPL-MCNC: 2.6 NG/ML — LOW
GLUCOSE SERPL-MCNC: 119 MG/DL — HIGH (ref 70–99)
HCT VFR BLD CALC: 31.5 % — LOW (ref 34.5–45)
HGB BLD-MCNC: 10.4 G/DL — LOW (ref 11.5–15.5)
IMM GRANULOCYTES NFR BLD AUTO: 0.7 % — SIGNIFICANT CHANGE UP (ref 0–1.5)
IRON SATN MFR SERPL: 18 % — SIGNIFICANT CHANGE UP (ref 14–50)
IRON SATN MFR SERPL: 51 UG/DL — SIGNIFICANT CHANGE UP (ref 30–160)
LYMPHOCYTES # BLD AUTO: 1.29 K/UL — SIGNIFICANT CHANGE UP (ref 1–3.3)
LYMPHOCYTES # BLD AUTO: 8.6 % — LOW (ref 13–44)
MCHC RBC-ENTMCNC: 30.1 PG — SIGNIFICANT CHANGE UP (ref 27–34)
MCHC RBC-ENTMCNC: 33 GM/DL — SIGNIFICANT CHANGE UP (ref 32–36)
MCV RBC AUTO: 91.3 FL — SIGNIFICANT CHANGE UP (ref 80–100)
MONOCYTES # BLD AUTO: 0.63 K/UL — SIGNIFICANT CHANGE UP (ref 0–0.9)
MONOCYTES NFR BLD AUTO: 4.2 % — SIGNIFICANT CHANGE UP (ref 2–14)
NEUTROPHILS # BLD AUTO: 12.73 K/UL — HIGH (ref 1.8–7.4)
NEUTROPHILS NFR BLD AUTO: 85.3 % — HIGH (ref 43–77)
NRBC # BLD: 0 /100 WBCS — SIGNIFICANT CHANGE UP (ref 0–0)
PLATELET # BLD AUTO: 280 K/UL — SIGNIFICANT CHANGE UP (ref 150–400)
POTASSIUM SERPL-MCNC: 3.6 MMOL/L — SIGNIFICANT CHANGE UP (ref 3.5–5.3)
POTASSIUM SERPL-SCNC: 3.6 MMOL/L — SIGNIFICANT CHANGE UP (ref 3.5–5.3)
PROT SERPL-MCNC: 6.3 G/DL — SIGNIFICANT CHANGE UP (ref 6–8.3)
RBC # BLD: 3.45 M/UL — LOW (ref 3.8–5.2)
RBC # FLD: 15 % — HIGH (ref 10.3–14.5)
SODIUM SERPL-SCNC: 141 MMOL/L — SIGNIFICANT CHANGE UP (ref 135–145)
TIBC SERPL-MCNC: 279 UG/DL — SIGNIFICANT CHANGE UP (ref 220–430)
UIBC SERPL-MCNC: 228 UG/DL — SIGNIFICANT CHANGE UP (ref 110–370)
VIT B12 SERPL-MCNC: 680 PG/ML — SIGNIFICANT CHANGE UP (ref 232–1245)
VIT D25+D1,25 OH+D1,25 PNL SERPL-MCNC: 37.8 PG/ML — SIGNIFICANT CHANGE UP (ref 19.9–79.3)
WBC # BLD: 14.93 K/UL — HIGH (ref 3.8–10.5)
WBC # FLD AUTO: 14.93 K/UL — HIGH (ref 3.8–10.5)

## 2021-04-29 PROCEDURE — 72148 MRI LUMBAR SPINE W/O DYE: CPT | Mod: 26

## 2021-04-29 PROCEDURE — 99233 SBSQ HOSP IP/OBS HIGH 50: CPT

## 2021-04-29 PROCEDURE — 99232 SBSQ HOSP IP/OBS MODERATE 35: CPT

## 2021-04-29 RX ORDER — FOLIC ACID 0.8 MG
1 TABLET ORAL DAILY
Refills: 0 | Status: DISCONTINUED | OUTPATIENT
Start: 2021-04-29 | End: 2021-06-02

## 2021-04-29 RX ADMIN — LIDOCAINE 1 PATCH: 4 CREAM TOPICAL at 12:32

## 2021-04-29 RX ADMIN — ZINC OXIDE 1 APPLICATION(S): 200 OINTMENT TOPICAL at 18:14

## 2021-04-29 RX ADMIN — OXYCODONE HYDROCHLORIDE 10 MILLIGRAM(S): 5 TABLET ORAL at 12:30

## 2021-04-29 RX ADMIN — Medication 100 MILLIGRAM(S): at 05:59

## 2021-04-29 RX ADMIN — OXYCODONE HYDROCHLORIDE 5 MILLIGRAM(S): 5 TABLET ORAL at 11:10

## 2021-04-29 RX ADMIN — Medication 5 MILLIGRAM(S): at 05:59

## 2021-04-29 RX ADMIN — NYSTATIN CREAM 1 APPLICATION(S): 100000 CREAM TOPICAL at 18:14

## 2021-04-29 RX ADMIN — Medication 5 MILLIGRAM(S): at 18:14

## 2021-04-29 RX ADMIN — ZINC OXIDE 1 APPLICATION(S): 200 OINTMENT TOPICAL at 06:00

## 2021-04-29 RX ADMIN — LIDOCAINE 1 PATCH: 4 CREAM TOPICAL at 20:31

## 2021-04-29 RX ADMIN — OXYCODONE HYDROCHLORIDE 10 MILLIGRAM(S): 5 TABLET ORAL at 13:25

## 2021-04-29 RX ADMIN — ENOXAPARIN SODIUM 40 MILLIGRAM(S): 100 INJECTION SUBCUTANEOUS at 05:59

## 2021-04-29 RX ADMIN — NYSTATIN CREAM 1 APPLICATION(S): 100000 CREAM TOPICAL at 06:00

## 2021-04-29 RX ADMIN — ESCITALOPRAM OXALATE 10 MILLIGRAM(S): 10 TABLET, FILM COATED ORAL at 12:32

## 2021-04-29 RX ADMIN — OXYCODONE HYDROCHLORIDE 5 MILLIGRAM(S): 5 TABLET ORAL at 10:12

## 2021-04-29 RX ADMIN — SENNA PLUS 2 TABLET(S): 8.6 TABLET ORAL at 21:35

## 2021-04-29 RX ADMIN — Medication 1 MILLIGRAM(S): at 13:02

## 2021-04-29 RX ADMIN — Medication 112 MICROGRAM(S): at 05:59

## 2021-04-29 NOTE — PROGRESS NOTE ADULT - REASON FOR ADMISSION
SAH and fractures of left-sided ribs, left iliac wing with hematoma, S/P closed reduction of left pinky PIP dislocation after trauma  2.2 SAH and fractures of left-sided ribs, left iliac wing with hematoma, S/P closed reduction of left pinky PIP dislocation after trauma

## 2021-04-29 NOTE — PROGRESS NOTE ADULT - SUBJECTIVE AND OBJECTIVE BOX
Patient is a 65y old  Female who presents with a chief complaint of SAH and fractures of left-sided ribs, left iliac wing with hematoma, S/P closed reduction of left pinky PIP dislocation after trauma (2021 13:54)      Patient seen and examined at bedside.  No overnight events  No complaints this morning  Seems to be a bit confused at times    ALLERGIES:  Tapazole (Hives)    MEDICATIONS  (STANDING):  bethanechol 5 milliGRAM(s) Oral two times a day  enoxaparin Injectable 40 milliGRAM(s) SubCutaneous every 24 hours  escitalopram 10 milliGRAM(s) Oral daily  levothyroxine 112 MICROGram(s) Oral daily  lidocaine   Patch 1 Patch Transdermal daily  metoprolol succinate  milliGRAM(s) Oral daily  nystatin Powder 1 Application(s) Topical two times a day  senna 2 Tablet(s) Oral at bedtime  zinc oxide 40% Ointment 1 Application(s) Topical two times a day    MEDICATIONS  (PRN):  acetaminophen   Tablet .. 650 milliGRAM(s) Oral every 6 hours PRN Temp greater or equal to 38C (100.4F), Mild Pain (1 - 3)  oxyCODONE    IR 5 milliGRAM(s) Oral every 4 hours PRN Moderate Pain (4 - 6)  oxyCODONE    IR 10 milliGRAM(s) Oral every 6 hours PRN Severe Pain (7 - 10)  polyethylene glycol 3350 17 Gram(s) Oral daily PRN Constipation    Vital Signs Last 24 Hrs  T(F): 98.8 (2021 20:07), Max: 98.8 (2021 20:07)  HR: 69 (2021 05:54) (69 - 69)  BP: 122/84 (2021 05:54) (119/75 - 122/84)  RR: 14 (2021 05:54) (14 - 14)  SpO2: 94% (2021 05:54) (94% - 94%)  I&O's Summary    2021 07:01  -  2021 07:00  --------------------------------------------------------  IN: 0 mL / OUT: 2520 mL / NET: -2520 mL      BMI (kg/m2): 21.4 (21 @ 18:31)    PHYSICAL EXAM:  GENERAL: NAD  HENT:  B/L raccoon eyes, laceration over left eye, Normocephalic; No tonsillar erythema, exudates, ulcers, or enlargement; Moist mucous membranes  EYES: EOMI, PERRLA, conjunctiva and sclera clear; no lid-lag  NECK: Supple, No JVD, Normal thyroid  CHEST/LUNG: Clear to percussion bilaterally; No rales, rhonchi, wheezing, or rubs; normal respiratory effort, no intercostal retractions  HEART: Regular rate and rhythm; No murmurs, rubs, or gallops  ABDOMEN: Soft, Nontender, Nondistended; Bowel sounds present; No HSM  EXTREMITIES:  2+ Peripheral Pulses, LUE in sling, left pinky in splint  LYMPH: No lymphadenopathy noted  SKIN: bruising to face and arms B/L; normal temperature and turgor   PSYCH: Appropriate affect; Alert & Awake    LABS:                        10.4   14.93 )-----------( 280      ( 2021 05:30 )             31.5           141  |  106  |  14  ----------------------------<  119  3.6   |  24  |  0.51    Ca    8.5      2021 05:30    TPro  6.3  /  Alb  2.9  /  TBili  1.3  /  DBili  x   /  AST  16  /  ALT  30  /  AlkPhos  136       eGFR if African American: 115 mL/min/1.73M2 (21 @ 05:30)  eGFR if Non African American: 100 mL/min/1.73M2 (21 @ 05:30)       TSH 3.89   TSH with FT4 reflex --  Total T3 --    Urinalysis Basic - ( 2021 16:45 )    Color: Yellow / Appearance: very cloudy / S.015 / pH: x  Gluc: x / Ketone: Negative  / Bili: Negative / Urobili: Negative   Blood: x / Protein: 30 mg/dL / Nitrite: Positive   Leuk Esterase: Small / RBC: 0-4 /HPF / WBC 3-5 /HPF   Sq Epi: x / Non Sq Epi: Neg.-Few / Bacteria: Moderate /HPF      Care Discussed with Consultants/Other Providers: Yes

## 2021-04-29 NOTE — PROGRESS NOTE ADULT - ASSESSMENT
66 yo woman with PMHx breast cancer s/p mastectomy, HTN, Hypothyroidism admitted to [facility] after hospital course at [facility] after a fall from a flight of stairs.    #Impaired gait, mobility, ADL  #Traumatic Fall  #SAH, left-sided rib fractures, left iliac wing fracture with hematoma, left forehead laceration, s/p successful closed reduction of left pinky PIP dislocation.    - Comprehensive rehab program of PT/OT/SLP - 3 hours a day, 5 days a week  - Continue Keppra for 7 days total  - Continue Lexapro  - Monitor H/H  - Fall, Seizure, Bleeding Precautions  - Bowel regimen as per primary team  - Pain meds as per primary team     #Acute Hypoxic Respiratory Failure likely due to Acute B/L rib fractures, and small pneumothorax  - Resolved. The last 24hrs, O2 sat >94%  - Incentive spirometry.   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 Hernández [phone]. (elective ablation)    #Hypothyroidism  - Continue Synthroid  - TSH 1.2 (4/22)    #DVT PPX - Lovenox 66 yo woman with PMHx breast cancer s/p mastectomy, HTN, Hypothyroidism admitted to [facility] after hospital course at [facility] after a fall from a flight of stairs.    #Impaired gait, mobility, ADL  #Traumatic Fall  #SAH, left-sided rib fractures, left iliac wing fracture with hematoma, left forehead laceration, s/p successful closed reduction of left pinky PIP dislocation.    - Comprehensive rehab program of PT/OT/SLP - 3 hours a day, 5 days a week  - Continue Keppra for 7 days total  - Continue Lexapro  - Monitor H/H  - follow Vit D level  - Fall, Seizure, Bleeding Precautions  - Bowel regimen as per primary team  - Pain meds as per primary team     #Leukocytosis  - UA is neg for infection  - likely reactive  - continue to monitor  - if fever spike, send septic work up    #Elevated ALP and TB  - trend LFTs  - continue to monitor    #Acute Hypoxic Respiratory Failure likely due to Acute B/L rib fractures, and small pneumothorax  - Resolved. The last 24hrs, O2 sat >94%  - Incentive spirometry.   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 Hernández [phone]. (elective ablation)    #Hypothyroidism  - Continue Synthroid  - TSH 1.2 (4/22)    #DVT PPX - Lovenox

## 2021-04-29 NOTE — PROGRESS NOTE ADULT - ASSESSMENT
Assessment/Plan:  JONATHAN CHILD is a 65y with PMHx breast Ca S/P mastectomy, HTN, hypothyroidism presented to [facility] 4/19/21 as level 2 trauma after falling down a flight of stairs. She was found to have bilateral frontoparietal SAH, L 3-8 and R 7th rib fractures with occult L pneumothorax, sternal fracture, L iliac wing fracture with hematoma S/P 1U pRBCs--WBAT Left LE, L forehead laceration S/P suture repair, L pinky PIP dislocation S/P closed reduction with buddy splint, L clavicle fracture-- NWB Left UE, L nasal bone and L orbital rim fractures, and sternal fracture. No surgical intervention.  Admitted to rehab with cognitive deficits, gait and ADL impairment.      Comprehensive Multidisciplinary Rehab Program:  - Start comprehensive rehab program, PT/OT/SLP 3 hours a day, 5 days a week.  - WBAT LLE, NWB LUE but ok to do ROM exercises for L elbow, wrist, hand and L shoulder pendulum exercises  - Precautions: falls, sternal     #SAH  - S/P Keppra 7 day course for seizure ppx  - Avoid NSAIDs  - F/u neurosurgery, Dr. Vikas Aquino, upon discharge    #Multiple fractures  - L 3-8 and R 7th rib fractures with occult L pneumothorax, sternal fracture, L iliac wing fracture with hematoma S/P 1U pRBCs, L forehead laceration S/P suture repair, L pinky PIP dislocation S/P closed reduction with buddy splint, L clavicle fracture, L nasal bone and L orbital rim fractures, and sternal fracture  - Sling to LUE for comfort. No lifting more than 1-3 pounds for 6 weeks.  - Continue Lovenox for 6 weeks total (4/22-6/3)  - Tylenol, Oxycodone PRN for pain control. Lidoderm to L chest wall.  - F/u plastic surgeon Dr. Nell Choe, for facial laceration, L pinky dislocation, L nasal bone and orbital rim fractures  - F/u ortho, Dr. Reji Cardoza, for L clavicle and L pelvic wing fractures  - F/u surgery, Dr. Dino Peña, for rib fractures  - CT T/L spine shows T5 compression fracture of indeterminate age and acute L3 fracture. Placed on bedrest. MRI pending today to r/o cord compression. No clinical signs of cord compression other than urinary retention. Ortho consulted, recommended TLSO. Orthotist to come this afternoon to fit patient with brace. Continue current pain regimen.    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 Morgan Hernández, outpatient in 3 months, may consider elective ablation.    #R kidney lesion  - F/u Dr. Herrera outpatient or PCP    #Hypothyroidism  - Continue Synthroid 112mcg daily    GI/Bowel:  - At risk for constipation due to neurologic diagnosis, immobility and/or medication use  - Senna QHS, Miralax PRN Daily    /Bladder:   - At risk for incontinence and retention due to neurologic diagnosis and limited mobility  - Currently patient has urinary retention with SC Q6hr up to 1150cc.  - Spangler placed 4/29/21    Skin/Pressure Injury:   - At risk for pressure injury due to neurologic diagnosis and relative immobility.  - Skin assessment on admission: incontinence associated dermatitis at inner thighs, groin and buttocks. Start desitin and nystatin powder to affected areas.   - Monitor Incisions: L facial laceration  - Turn every 2 hours while in bed, air mattress  - Soft heel protectors  - Skin barrier cream as needed  - Nursing to monitor skin Qshift    DVT ppx:  - Lovenox  - SCDs    IDT 4/29/21:  - SW: lives with son and spouse, has HHA for iADLs but able to do ADLs independently. Owns cane and RW.  - OT: Mark eating, totalA dressing and toileting, maxA toilet transfers. Goal: supervision grooming, bathing LBD, Barb UBD and toilet transfers, Mark shower transfers  - PT: maxA transfer. No stairs or ambulation attempted. Goals: Mark in 3 weeks.  - SLP: regular/thins. Pending full SLP eval but visual deficits ?L neglect, mild-mod cognitive deficits, decreased attention and memory.  - Barrier: back pain  - EDOD: 5/19 to home  ---------------  Outpatient Follow-up (Specialty/Name of physician):  Silvina Herrera  INTERNAL MEDICINE  [address] [place] [address]  Phone: [phone]  Fax: [phone]  Follow Up Time: 1 week    Vikas Aquino (MD)  Neurosurgery  [address], [address] [place] [address]  Phone: [phone]  Fax: [phone]  Follow Up Time: 2 weeks    Nell Choe)  Plastic Surgery  [address] [facility], [address]  [place], [place] [address]  Phone: [phone]  Fax: [phone]  Follow Up Time: 1 week    Reji Cardoza)  Orthopaedic Surgery  [address] [facility], [address]  [place] [address]  Phone: [phone]  Fax: [phone]  Follow Up Time: Routine    Dino Peña)  Surgery; Surgical Critical Care  [address] [facility], [address], [place] [address]  Phone: [phone]  Fax: [phone]  Follow Up Time: Routine    Morgan Hernández; PhD)  Cardiac Electrophysiology; Cardiovascular Disease; Internal Medicine  [facility] - Dept of Cardiology, [address] [place] [address]  Phone: [phone]  Fax: [phone]

## 2021-04-29 NOTE — PROGRESS NOTE ADULT - SUBJECTIVE AND OBJECTIVE BOX
Patient is a 65y old  Female who presents with a chief complaint of SAH and fractures of left-sided ribs, left iliac wing with hematoma, S/P closed reduction of left pinky PIP dislocation after trauma  2.2 (2021 08:33)      HPI:  65F PMHx breast cancer s/p mastectomy, HTN, hypothyroidism, not on full anticoagulation/antiplatelets per EMS, presented to [facility] 21as a level 2 trauma activation after a fall from a flight of stairs. Patient only able to say "ow" in trauma bay, unable to provide further history. However per son, normal mentation at baseline. In the trauma bay, airway was intact with bilateral breath sounds, O2 saturation 100% on room air. Initially blood pressure was 140s systolic however on repeat 90s. NS@100 initiated. Secondary significant for left forehead laceration, left chest wall tenderness, left pelvic tenderness.     Ms. Arthur was hypotensive in the trauma bay and was transferred to the Surgical ICU after imaging was obtained. Her injury list is below.  (1) SAH, bilateral: stable on repeat imaging. Seven day course of Keppra for post-traumatic seizure prophylaxis.  (2) left 3-8 and right 7th rib fractures with occult left pneumothorax: pain control  (3) left iliac wing fracture with hematoma: required transfusion early in her ICU course, after which CBC were stable  (4) left forehead laceration s/p suture repair  (5) s/p successful closed reduction of left pinky PIP dislocation with delfin splint  (6) left clavicle fracture  (7) left nasal bone and left orbital rim fractures  (8) sternal fracture  Incidental findings: right renal lesion. Pt's  was notified and copy of the report given to him.     The patient was admitted to Trauma Surgery under SICU care.  Ortho was consulted for L. iliac wing fx, sternal fx, L. clavicle fx- recommended WBAT of the affected left lower extremity with walker, WBAT L shoulder, pendulum exercises okay, no lifting, sling for comfort, Encourage ROM of elbow/wrist/hand, PT/OT consult, NO urgent orthopedic surgical intervention at this time.    Neurosurgery consulted- recommended repeat CTH, which was stable.     PRS was consulted for non displaced left superior orbital rim and non displaced left nasal bone fracture--> No indication for operative fixation of these non displaced fractures; recommend ice to face to aid in swelling and analgesia.    She was placed on hemorrhage watch in the SICU given pelvic fracture and surrounding hematoma.  Behavior health was consulted for delirium.    Hand was consulted for L. erlinda PIP dislocation- s/p closed reduction, rec Cont delfin taping for now.    - CTH stable  - Added home lexapro  - Added NS @ 50cc/hr to supplement poor PO intake  - Lactate cleared (1.9)    - Transfused 1u pRBC, Hct 20.9 -> 26.9  - Went into SVT, given adenosine x1 and started on metoprolol 25mg q12hr  - Added oxycodone 5mg q4hrs prn for increased pain control  - PO intake improved, IVL    - EP was consulted for SVT, recc continue lopressor 25mg   Metoprolol PO increased from 25mg q12hrs to 25mg q6hrs.   - Added salt tabs 1g q8hrs and started on NS @ 50cc/hr for hyponatremia  - Spangler discontinued, passed TOV  - Added lovenox  - Cervical collar cleared by confrontational exam     - Patient transferred from SICU to surgical floor in stable condition.  Discharge planning to acute rehab.     Stable from neurologic perspective consistent with mild TBI  Multiple rib fractures - breathing comfortable with multimodal pain control    On day of discharge, the patient was tolerating diet, working with PT well and pain controlled. The patient was medically stable for discharge to acute rehab with instructions for outpatient follow up 21. 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 Patient was out of bed in chair for breakfast. Told RN and PCA to place back on bedrest. Patient c/o back pain. Denies numbness, tingling, new weakness.    Vital Signs Last 24 Hrs  T(C): 37.1 (2021 20:07), Max: 37.1 (2021 20:07)  T(F): 98.8 (2021 20:07), Max: 98.8 (2021 20:07)  HR: 69 (2021 05:54) (69 - 69)  BP: 122/84 (2021 05:54) (119/75 - 122/84)  BP(mean): --  RR: 14 (2021 05:54) (14 - 14)  SpO2: 94% (2021 05:54) (94% - 94%)    Gen - NAD, Comfortable  HEENT - periorbital ecchymoses, EOMI, MMM  Neck - No cervical tenderness  Pulm - CTAB, No wheeze  Cardiovascular - RRR, S1S2, No m/r/g  Abdomen - Soft, NT/ND, +BS  Extremities - No C/C/E, No calf tenderness  Neuro-     Cognitive - AAOx2 to person and year. States it is , and unsure of location.     Communication - Fluent, delayed. Able to repeat.      Attention: Impaired. Requires frequent repetition and cueing.      Memory: Recall 3/3 objects immediate and 0/3 5 min later         Cranial Nerves - CN 2-12 intact     Motor -                    LEFT    UE - Limited due to left clavicle fracture. Able to wiggle fingers and extend wrist and bend elbow about 3-4/5.                     RIGHT UE - ShAB 5/5, EF 5/5, EE 5/5, WE 5/5,  5/5                    LEFT    LE - HF limited due to pain--antigravity strength, KE 5/5, DF 5/5, PF 5/5                    RIGHT LE - HF 5/5, KE 5/5, DF 5/5, PF 5/5        Sensory - Intact to LT     Reflexes - DTR Intact, No primitive reflex     Coordination - FTN intact     Tone - normal  Psychiatric - Mood stable, Affect WNL  Skin: scattered ecchymoses all over body in various states of healing.   Wounds: blanachable irritation along inner thighs and buttocks.        RECENT LABS:                          10.4   14.93 )-----------( 280      ( 2021 05:30 )             31.5         141  |  106  |  14  ----------------------------<  119<H>  3.6   |  24  |  0.51    Ca    8.5      2021 05:30    TPro  6.3  /  Alb  2.9<L>  /  TBili  1.3<H>  /  DBili  x   /  AST  16  /  ALT  30  /  AlkPhos  136<H>      LIVER FUNCTIONS - ( 2021 05:30 )  Alb: 2.9 g/dL / Pro: 6.3 g/dL / ALK PHOS: 136 U/L / ALT: 30 U/L / AST: 16 U/L / GGT: x             Urinalysis Basic - ( 2021 16:45 )    Color: Yellow / Appearance: very cloudy / S.015 / pH: x  Gluc: x / Ketone: Negative  / Bili: Negative / Urobili: Negative   Blood: x / Protein: 30 mg/dL / Nitrite: Positive   Leuk Esterase: Small / RBC: 0-4 /HPF / WBC 3-5 /HPF   Sq Epi: x / Non Sq Epi: Neg.-Few / Bacteria: Moderate /HPF          CAPILLARY BLOOD GLUCOSE          MEDICATIONS  (STANDING):  bethanechol 5 milliGRAM(s) Oral two times a day  enoxaparin Injectable 40 milliGRAM(s) SubCutaneous every 24 hours  escitalopram 10 milliGRAM(s) Oral daily  folic acid 1 milliGRAM(s) Oral daily  levothyroxine 112 MICROGram(s) Oral daily  lidocaine   Patch 1 Patch Transdermal daily  metoprolol succinate  milliGRAM(s) Oral daily  nystatin Powder 1 Application(s) Topical two times a day  senna 2 Tablet(s) Oral at bedtime  zinc oxide 40% Ointment 1 Application(s) Topical two times a day    MEDICATIONS  (PRN):  acetaminophen   Tablet .. 650 milliGRAM(s) Oral every 6 hours PRN Temp greater or equal to 38C (100.4F), Mild Pain (1 - 3)  oxyCODONE    IR 5 milliGRAM(s) Oral every 4 hours PRN Moderate Pain (4 - 6)  oxyCODONE    IR 10 milliGRAM(s) Oral every 6 hours PRN Severe Pain (7 - 10)  polyethylene glycol 3350 17 Gram(s) Oral daily PRN Constipation           Patient is a 65y old  Female who presents with a chief complaint of SAH and fractures of left-sided ribs, left iliac wing with hematoma, S/P closed reduction of left pinky PIP dislocation after trauma (2021 08:33)      HPI:  65F PMHx breast cancer s/p mastectomy, HTN, hypothyroidism, not on full anticoagulation/antiplatelets per EMS, presented to [facility] 21as a level 2 trauma activation after a fall from a flight of stairs. Patient only able to say "ow" in trauma bay, unable to provide further history. However per son, normal mentation at baseline. In the trauma bay, airway was intact with bilateral breath sounds, O2 saturation 100% on room air. Initially blood pressure was 140s systolic however on repeat 90s. NS@100 initiated. Secondary significant for left forehead laceration, left chest wall tenderness, left pelvic tenderness.     Ms. Arthur was hypotensive in the trauma bay and was transferred to the Surgical ICU after imaging was obtained. Her injury list is below.  (1) SAH, bilateral: stable on repeat imaging. Seven day course of Keppra for post-traumatic seizure prophylaxis.  (2) left 3-8 and right 7th rib fractures with occult left pneumothorax: pain control  (3) left iliac wing fracture with hematoma: required transfusion early in her ICU course, after which CBC were stable  (4) left forehead laceration s/p suture repair  (5) s/p successful closed reduction of left pinky PIP dislocation with delfin splint  (6) left clavicle fracture  (7) left nasal bone and left orbital rim fractures  (8) sternal fracture  Incidental findings: right renal lesion. Pt's  was notified and copy of the report given to him.     The patient was admitted to Trauma Surgery under SICU care.  Ortho was consulted for L. iliac wing fx, sternal fx, L. clavicle fx- recommended WBAT of the affected left lower extremity with walker, WBAT L shoulder, pendulum exercises okay, no lifting, sling for comfort, Encourage ROM of elbow/wrist/hand, PT/OT consult, NO urgent orthopedic surgical intervention at this time.    Neurosurgery consulted- recommended repeat CTH, which was stable.     PRS was consulted for non displaced left superior orbital rim and non displaced left nasal bone fracture--> No indication for operative fixation of these non displaced fractures; recommend ice to face to aid in swelling and analgesia.    She was placed on hemorrhage watch in the SICU given pelvic fracture and surrounding hematoma.  Behavior health was consulted for delirium.    Hand was consulted for L. pinky PIP dislocation- s/p closed reduction, rec Cont delfin taping for now.    - CTH stable  - Added home lexapro  - Added NS @ 50cc/hr to supplement poor PO intake  - Lactate cleared (1.9)    - Transfused 1u pRBC, Hct 20.9 -> 26.9  - Went into SVT, given adenosine x1 and started on metoprolol 25mg q12hr  - Added oxycodone 5mg q4hrs prn for increased pain control  - PO intake improved, IVL    - EP was consulted for SVT, recc continue lopressor 25mg   Metoprolol PO increased from 25mg q12hrs to 25mg q6hrs.   - Added salt tabs 1g q8hrs and started on NS @ 50cc/hr for hyponatremia  - Spangler discontinued, passed TOV  - Added lovenox  - Cervical collar cleared by confrontational exam     - Patient transferred from SICU to surgical floor in stable condition.  Discharge planning to acute rehab.     Stable from neurologic perspective consistent with mild TBI  Multiple rib fractures - breathing comfortable with multimodal pain control    On day of discharge, the patient was tolerating diet, working with PT well and pain controlled. The patient was medically stable for discharge to acute rehab with instructions for outpatient follow up 21. 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 Patient was out of bed in chair for breakfast. Told RN and PCA to place back on bedrest. Patient c/o back pain. Denies numbness, tingling, new weakness.    Vital Signs Last 24 Hrs  T(C): 37.1 (2021 20:07), Max: 37.1 (2021 20:07)  T(F): 98.8 (2021 20:07), Max: 98.8 (2021 20:07)  HR: 69 (2021 05:54) (69 - 69)  BP: 122/84 (2021 05:54) (119/75 - 122/84)  BP(mean): --  RR: 14 (2021 05:54) (14 - 14)  SpO2: 94% (2021 05:54) (94% - 94%)    Gen - NAD, Comfortable  HEENT - periorbital ecchymoses, EOMI, MMM  Neck - No cervical tenderness  Pulm - CTAB, No wheeze  Cardiovascular - RRR, S1S2, No m/r/g  Abdomen - Soft, NT/ND, +BS  Extremities - No C/C/E, No calf tenderness  Neuro-     Cognitive - AAOx2 to person and year. States it is , and unsure of location.     Communication - Fluent, delayed. Able to repeat.      Attention: Impaired. Requires frequent repetition and cueing.      Memory: Recall 3/3 objects immediate and 0/3 5 min later         Cranial Nerves - CN 2-12 intact     Motor -                    LEFT    UE - Limited due to left clavicle fracture. Able to wiggle fingers and extend wrist and bend elbow about 3-4/5.                     RIGHT UE - ShAB 5/5, EF 5/5, EE 5/5, WE 5/5,  5/5                    LEFT    LE - HF limited due to pain--antigravity strength, KE 5/5, DF 5/5, PF 5/5                    RIGHT LE - HF 5/5, KE 5/5, DF 5/5, PF 5/5        Sensory - Intact to LT     Reflexes - DTR Intact, No primitive reflex     Coordination - FTN intact     Tone - normal  Psychiatric - Mood stable, Affect WNL  Skin: scattered ecchymoses all over body in various states of healing.   Wounds: blanachable irritation along inner thighs and buttocks.        RECENT LABS:                          10.4   14.93 )-----------( 280      ( 2021 05:30 )             31.5         141  |  106  |  14  ----------------------------<  119<H>  3.6   |  24  |  0.51    Ca    8.5      2021 05:30    TPro  6.3  /  Alb  2.9<L>  /  TBili  1.3<H>  /  DBili  x   /  AST  16  /  ALT  30  /  AlkPhos  136<H>  04-    LIVER FUNCTIONS - ( 2021 05:30 )  Alb: 2.9 g/dL / Pro: 6.3 g/dL / ALK PHOS: 136 U/L / ALT: 30 U/L / AST: 16 U/L / GGT: x             Urinalysis Basic - ( 2021 16:45 )    Color: Yellow / Appearance: very cloudy / S.015 / pH: x  Gluc: x / Ketone: Negative  / Bili: Negative / Urobili: Negative   Blood: x / Protein: 30 mg/dL / Nitrite: Positive   Leuk Esterase: Small / RBC: 0-4 /HPF / WBC 3-5 /HPF   Sq Epi: x / Non Sq Epi: Neg.-Few / Bacteria: Moderate /HPF          CAPILLARY BLOOD GLUCOSE          MEDICATIONS  (STANDING):  bethanechol 5 milliGRAM(s) Oral two times a day  enoxaparin Injectable 40 milliGRAM(s) SubCutaneous every 24 hours  escitalopram 10 milliGRAM(s) Oral daily  folic acid 1 milliGRAM(s) Oral daily  levothyroxine 112 MICROGram(s) Oral daily  lidocaine   Patch 1 Patch Transdermal daily  metoprolol succinate  milliGRAM(s) Oral daily  nystatin Powder 1 Application(s) Topical two times a day  senna 2 Tablet(s) Oral at bedtime  zinc oxide 40% Ointment 1 Application(s) Topical two times a day    MEDICATIONS  (PRN):  acetaminophen   Tablet .. 650 milliGRAM(s) Oral every 6 hours PRN Temp greater or equal to 38C (100.4F), Mild Pain (1 - 3)  oxyCODONE    IR 5 milliGRAM(s) Oral every 4 hours PRN Moderate Pain (4 - 6)  oxyCODONE    IR 10 milliGRAM(s) Oral every 6 hours PRN Severe Pain (7 - 10)  polyethylene glycol 3350 17 Gram(s) Oral daily PRN Constipation

## 2021-04-29 NOTE — PROGRESS NOTE ADULT - REASON FOR ADMISSION
SAH and fractures of left-sided ribs, left iliac wing with hematoma, S/P closed reduction of left pinky PIP dislocation after trauma  2.2

## 2021-04-30 DIAGNOSIS — G47.9 SLEEP DISORDER, UNSPECIFIED: ICD-10-CM

## 2021-04-30 DIAGNOSIS — R41.89 OTHER SYMPTOMS AND SIGNS INVOLVING COGNITIVE FUNCTIONS AND AWARENESS: ICD-10-CM

## 2021-04-30 LAB
BASOPHILS # BLD AUTO: 0.02 K/UL — SIGNIFICANT CHANGE UP (ref 0–0.2)
BASOPHILS NFR BLD AUTO: 0.2 % — SIGNIFICANT CHANGE UP (ref 0–2)
EOSINOPHIL # BLD AUTO: 0.13 K/UL — SIGNIFICANT CHANGE UP (ref 0–0.5)
EOSINOPHIL NFR BLD AUTO: 1 % — SIGNIFICANT CHANGE UP (ref 0–6)
HCT VFR BLD CALC: 29.4 % — LOW (ref 34.5–45)
HGB BLD-MCNC: 9.6 G/DL — LOW (ref 11.5–15.5)
IMM GRANULOCYTES NFR BLD AUTO: 0.7 % — SIGNIFICANT CHANGE UP (ref 0–1.5)
LYMPHOCYTES # BLD AUTO: 1.5 K/UL — SIGNIFICANT CHANGE UP (ref 1–3.3)
LYMPHOCYTES # BLD AUTO: 11.3 % — LOW (ref 13–44)
MCHC RBC-ENTMCNC: 30.4 PG — SIGNIFICANT CHANGE UP (ref 27–34)
MCHC RBC-ENTMCNC: 32.7 GM/DL — SIGNIFICANT CHANGE UP (ref 32–36)
MCV RBC AUTO: 93 FL — SIGNIFICANT CHANGE UP (ref 80–100)
MONOCYTES # BLD AUTO: 0.62 K/UL — SIGNIFICANT CHANGE UP (ref 0–0.9)
MONOCYTES NFR BLD AUTO: 4.7 % — SIGNIFICANT CHANGE UP (ref 2–14)
NEUTROPHILS # BLD AUTO: 10.86 K/UL — HIGH (ref 1.8–7.4)
NEUTROPHILS NFR BLD AUTO: 82.1 % — HIGH (ref 43–77)
NRBC # BLD: 0 /100 WBCS — SIGNIFICANT CHANGE UP (ref 0–0)
PLATELET # BLD AUTO: 280 K/UL — SIGNIFICANT CHANGE UP (ref 150–400)
RBC # BLD: 3.16 M/UL — LOW (ref 3.8–5.2)
RBC # FLD: 15.3 % — HIGH (ref 10.3–14.5)
WBC # BLD: 13.22 K/UL — HIGH (ref 3.8–10.5)
WBC # FLD AUTO: 13.22 K/UL — HIGH (ref 3.8–10.5)

## 2021-04-30 PROCEDURE — 99232 SBSQ HOSP IP/OBS MODERATE 35: CPT

## 2021-04-30 PROCEDURE — 90792 PSYCH DIAG EVAL W/MED SRVCS: CPT

## 2021-04-30 RX ORDER — LANOLIN ALCOHOL/MO/W.PET/CERES
3 CREAM (GRAM) TOPICAL AT BEDTIME
Refills: 0 | Status: DISCONTINUED | OUTPATIENT
Start: 2021-04-30 | End: 2021-05-02

## 2021-04-30 RX ADMIN — LIDOCAINE 1 PATCH: 4 CREAM TOPICAL at 15:29

## 2021-04-30 RX ADMIN — NYSTATIN CREAM 1 APPLICATION(S): 100000 CREAM TOPICAL at 17:29

## 2021-04-30 RX ADMIN — Medication 1 MILLIGRAM(S): at 11:51

## 2021-04-30 RX ADMIN — Medication 5 MILLIGRAM(S): at 05:39

## 2021-04-30 RX ADMIN — ESCITALOPRAM OXALATE 10 MILLIGRAM(S): 10 TABLET, FILM COATED ORAL at 11:51

## 2021-04-30 RX ADMIN — Medication 112 MICROGRAM(S): at 05:39

## 2021-04-30 RX ADMIN — OXYCODONE HYDROCHLORIDE 10 MILLIGRAM(S): 5 TABLET ORAL at 08:50

## 2021-04-30 RX ADMIN — ZINC OXIDE 1 APPLICATION(S): 200 OINTMENT TOPICAL at 05:39

## 2021-04-30 RX ADMIN — OXYCODONE HYDROCHLORIDE 10 MILLIGRAM(S): 5 TABLET ORAL at 16:30

## 2021-04-30 RX ADMIN — LIDOCAINE 1 PATCH: 4 CREAM TOPICAL at 18:54

## 2021-04-30 RX ADMIN — LIDOCAINE 1 PATCH: 4 CREAM TOPICAL at 00:07

## 2021-04-30 RX ADMIN — SENNA PLUS 2 TABLET(S): 8.6 TABLET ORAL at 21:12

## 2021-04-30 RX ADMIN — Medication 1 TABLET(S): at 11:51

## 2021-04-30 RX ADMIN — ENOXAPARIN SODIUM 40 MILLIGRAM(S): 100 INJECTION SUBCUTANEOUS at 05:38

## 2021-04-30 RX ADMIN — Medication 5 MILLIGRAM(S): at 17:28

## 2021-04-30 RX ADMIN — ZINC OXIDE 1 APPLICATION(S): 200 OINTMENT TOPICAL at 17:29

## 2021-04-30 RX ADMIN — OXYCODONE HYDROCHLORIDE 10 MILLIGRAM(S): 5 TABLET ORAL at 07:51

## 2021-04-30 RX ADMIN — NYSTATIN CREAM 1 APPLICATION(S): 100000 CREAM TOPICAL at 05:39

## 2021-04-30 RX ADMIN — Medication 3 MILLIGRAM(S): at 21:11

## 2021-04-30 RX ADMIN — OXYCODONE HYDROCHLORIDE 10 MILLIGRAM(S): 5 TABLET ORAL at 15:30

## 2021-04-30 NOTE — BH CONSULTATION LIAISON ASSESSMENT NOTE - HPI (INCLUDE ILLNESS QUALITY, SEVERITY, DURATION, TIMING, CONTEXT, MODIFYING FACTORS, ASSOCIATED SIGNS AND SYMPTOMS)
65F PMHx breast cancer s/p mastectomy, HTN, hypothyroidism, not on full anticoagulation/antiplatelets per EMS, presented to [facility] 4/19/21as a level 2 trauma activation after a fall from a flight of stairs. Patient only able to say "ow" in trauma bay, unable to provide further history. However per son, normal mentation at baseline. In the trauma bay, airway was intact with bilateral breath sounds, O2 saturation 100% on room air. Initially blood pressure was 140s systolic however on repeat 90s. NS@100 initiated. Secondary significant for left forehead laceration, left chest wall tenderness, left pelvic tenderness.   Transferred to the Surgical ICU after imaging was obtained. Her injury list is below.  (1) SAH, bilateral: stable on repeat imaging. Seven day course of Keppra for post-traumatic seizure prophylaxis.  (2) left 3-8 and right 7th rib fractures with occult left pneumothorax: pain control  (3) left iliac wing fracture with hematoma: required transfusion early in her ICU course, after which CBC were stable  (4) left forehead laceration s/p suture repair  (5) s/p successful closed reduction of left pinky PIP dislocation with delfin splint  (6) left clavicle fracture  (7) left nasal bone and left orbital rim fractures  (8) sternal fracture  Incidental findings: right renal lesion. Pt's  was notified and copy of the report given to him.     The patient was admitted to Trauma Surgery under SICU care.  Ortho was consulted for L. iliac wing fx, sternal fx, L. clavicle fx- recommended WBAT of the affected left lower extremity with walker, WBAT L shoulder, pendulum exercises okay, no lifting, sling for comfort, Encourage ROM of elbow/wrist/hand, PT/OT consult, NO urgent orthopedic surgical intervention at this time.    Neurosurgery consulted- recommended repeat CTH, which was stable.   PRS was consulted for non displaced left superior orbital rim and non displaced left nasal bone fracture-->  recommend ice to face to aid in swelling and analgesia.  She was placed on hemorrhage watch in the SICU given pelvic fracture and surrounding hematoma.  Behavior health was consulted for delirium.  Hand was consulted for L. pinky PIP dislocation- s/p closed reduction, rec Cont delfin taping for now.    4/21- CTH stable  - Added home lexapro  - Added NS @ 50cc/hr to supplement poor PO intake  - Lactate cleared (1.9)    4/22- Transfused 1u pRBC, Hct 20.9 -> 26.9  - Went into SVT, given adenosine x1 and started on metoprolol 25mg q12hr  - Added oxycodone 5mg q4hrs prn for increased pain control  - PO intake improved, IVL    4/23- EP was consulted for SVT, recc continue lopressor 25mg   Metoprolol PO increased from 25mg q12hrs to 25mg q6hrs.   - Added salt tabs 1g q8hrs and started on NS @ 50cc/hr for hyponatremia  - Spangler discontinued, passed TOV  - Added lovenox  - Cervical collar cleared by confrontational exam     4/24- Patient transferred from SICU to surgical floor in stable condition.  Discharge planning to acute rehab.   Stable from neurologic perspective consistent with mild TBI. Multiple rib fractures - breathing comfortable with multimodal pain control  On day of discharge, the patient was tolerating diet, working with PT well and pain controlled. The patient was medically stable for discharge to acute rehab with instructions for outpatient follow up 4/27/21.    Psychiatry C/L Assessment:  Patient was seen and evaluated by psych for possible depression, flat affect, low energy and appetite per . Patient responded in few words to most questions, mostly responded with "okay." She describes mood as "okay", she says she is eating "okay." She said "I think I am 100%..." but was unable to complete the thought. She denies any feelings of sadness, depressed mood, anxiety, hallucination. Pt is however having difficulty staying asleep, estimates ~5hrs each night. She also admits to having difficulty with memory and concentration. Patient denies history of depression, anxiety or other psychiatric illness. She denies family history of mental illness.   On exam patient presents with flat affect, slowed, monotone speech and poor productivity. She has a tendency to trail off when attempting to speak full sentences and look away. She is oriented to person, situation ("I fell down the stairs"), date/time. Oriented to  but wrong hospital ("[facility]"). She recalls the last 2 presidents correctly. She had difficulty with delayed memory recall (0/3), difficulty with concentration (1/5 serial 7's). Registration, repetition and naming intact. Can follow directions. She said she was unable to perform written part of exam.  Patient recalls that she lives in [place] with  and son. She has been  "30-something years...since 1987," and has 2 sons. She recalls that she is retired, and she said she thinks she was a  before that but is unsure. She says she went to college at [employer]. She denies, smoking, drinking, illicit drug use. She practices Zoroastrian.   Attempted to contact  Silverio ([phone]) and Son Henok ([phone]) but without answer.

## 2021-04-30 NOTE — PROGRESS NOTE ADULT - SUBJECTIVE AND OBJECTIVE BOX
Patient is a 65y old  Female who presents with a chief complaint of SAH and fractures of left-sided ribs, left iliac wing with hematoma, S/P closed reduction of left pinky PIP dislocation after trauma  2.2 (2021 13:18)    NO events overnight  Denies chest pain, SOB  Does not remember when her last BM was  Patient seen and examined at bedside.    ALLERGIES:  Tapazole (Hives)    MEDICATIONS  (STANDING):  bethanechol 5 milliGRAM(s) Oral two times a day  enoxaparin Injectable 40 milliGRAM(s) SubCutaneous every 24 hours  escitalopram 10 milliGRAM(s) Oral daily  folic acid 1 milliGRAM(s) Oral daily  levothyroxine 112 MICROGram(s) Oral daily  lidocaine   Patch 1 Patch Transdermal daily  metoprolol succinate  milliGRAM(s) Oral daily  multivitamin 1 Tablet(s) Oral daily  nystatin Powder 1 Application(s) Topical two times a day  senna 2 Tablet(s) Oral at bedtime  zinc oxide 40% Ointment 1 Application(s) Topical two times a day    MEDICATIONS  (PRN):  acetaminophen   Tablet .. 650 milliGRAM(s) Oral every 6 hours PRN Temp greater or equal to 38C (100.4F), Mild Pain (1 - 3)  oxyCODONE    IR 5 milliGRAM(s) Oral every 4 hours PRN Moderate Pain (4 - 6)  oxyCODONE    IR 10 milliGRAM(s) Oral every 6 hours PRN Severe Pain (7 - 10)  polyethylene glycol 3350 17 Gram(s) Oral daily PRN Constipation    Vital Signs Last 24 Hrs  T(F): 98.4 (2021 07:47), Max: 98.5 (2021 20:35)  HR: 68 (2021 07:47) (68 - 73)  BP: 102/65 (2021 07:47) (102/61 - 103/68)  RR: 14 (2021 07:47) (14 - 14)  SpO2: 94% (2021 07:47) (93% - 94%)  I&O's Summary    2021 07:01  -  2021 07:00  --------------------------------------------------------  IN: 0 mL / OUT: 300 mL / NET: -300 mL    2021 07:01  -  2021 08:32  --------------------------------------------------------  IN: 0 mL / OUT: 200 mL / NET: -200 mL      BMI (kg/m2): 21.4 (21 @ 18:31)  PHYSICAL EXAM:  General: NAD, appears weak overall, A/O x 2, left eye echymosis noted over periorbital area, follows simple commands but slow in speech and movement  ENT: MMM, no scleral icterus  Neck: Supple, No JVD, no thyroidomegaly  Lungs: Clear to auscultation bilaterally, no wheezes, no rales, no rhonchi, good inspiratory effort  Cardio: RRR, S1/S2, No murmurs  Abdomen: Soft, Nontender, Nondistended; Bowel sounds present  Extremities: No calf tenderness, No pitting edema, no skin changes  Neuro: Moving all extremities spontaneously    LABS:                        9.6    13.22 )-----------( 280      ( 2021 05:00 )             29.4           141  |  106  |  14  ----------------------------<  119  3.6   |  24  |  0.51    Ca    8.5      2021 05:30    TPro  6.3  /  Alb  2.9  /  TBili  1.3  /  DBili  x   /  AST  16  /  ALT  30  /  AlkPhos  136       eGFR if African American: 115 mL/min/1.73M2 (21 @ 05:30)  eGFR if Non African American: 100 mL/min/1.73M2 (21 @ 05:30)    TSH 3.89   TSH with FT4 reflex --  Total T3 --    Urinalysis Basic - ( 2021 16:45 )    Color: Yellow / Appearance: very cloudy / S.015 / pH: x  Gluc: x / Ketone: Negative  / Bili: Negative / Urobili: Negative   Blood: x / Protein: 30 mg/dL / Nitrite: Positive   Leuk Esterase: Small / RBC: 0-4 /HPF / WBC 3-5 /HPF   Sq Epi: x / Non Sq Epi: Neg.-Few / Bacteria: Moderate /HPF        COVID-19 PCR: NotDetec (21 @ 18:10)  COVID-19 PCR: NotDetec (21 @ 06:33)  COVID-19 PCR: NotDetec (21 @ 11:24)  COVID-19 PCR: NotDetec (21 @ 11:09)        RADIOLOGY & ADDITIONAL TESTS:  MR Lumbar Spine No Cont (21 @ 14:05) >  IMPRESSION:  Acute to subacute benign compression fracture of L3 and left sacral insufficiency fracture.    CT Lumbar Spine No Cont (21 @ 11:23) >  IMPRESSION:  Age-indeterminate mild anterior compression fracture T5 vertebral body.    Fracture at the right superior endplate of L3 vertebral body with mild depression appears acute. Correlate clinically with site of pain/injury.    Multiple acute fractures of left ribs. CT chest recommended for complete evaluation    Care Discussed with Consultants/Other Providers:     Order received from surgical PA to measure and fit patient with LSO for control of lumbar fracture  Pt measured and brace adjusted. Pt sat up and brace applied to check fit  Pt to use only OOB and while sitting .  Do not sleep  or lie down with brace applied  Written instructions left in package at bedside  Follow up if needed    John Gauthier CO  [phone].

## 2021-04-30 NOTE — BH CONSULTATION LIAISON ASSESSMENT NOTE - CURRENT MEDICATION
MEDICATIONS  (STANDING):  bethanechol 5 milliGRAM(s) Oral two times a day  enoxaparin Injectable 40 milliGRAM(s) SubCutaneous every 24 hours  escitalopram 10 milliGRAM(s) Oral daily  folic acid 1 milliGRAM(s) Oral daily  levothyroxine 112 MICROGram(s) Oral daily  lidocaine   Patch 1 Patch Transdermal daily  melatonin 3 milliGRAM(s) Oral at bedtime  metoprolol succinate  milliGRAM(s) Oral daily  multivitamin 1 Tablet(s) Oral daily  nystatin Powder 1 Application(s) Topical two times a day  senna 2 Tablet(s) Oral at bedtime  zinc oxide 40% Ointment 1 Application(s) Topical two times a day    MEDICATIONS  (PRN):  acetaminophen   Tablet .. 650 milliGRAM(s) Oral every 6 hours PRN Temp greater or equal to 38C (100.4F), Mild Pain (1 - 3)  oxyCODONE    IR 5 milliGRAM(s) Oral every 4 hours PRN Moderate Pain (4 - 6)  oxyCODONE    IR 10 milliGRAM(s) Oral every 6 hours PRN Severe Pain (7 - 10)  polyethylene glycol 3350 17 Gram(s) Oral daily PRN Constipation

## 2021-04-30 NOTE — BH CONSULTATION LIAISON ASSESSMENT NOTE - SUMMARY
JONATHAN CHILD is a 65y with PMHx breast Ca S/P mastectomy, HTN, hypothyroidism presented to [facility] 4/19/21 as level 2 trauma after falling down a flight of stairs. She was found to have bilateral frontoparietal SAH, L 3-8 and R 7th rib fractures with occult L pneumothorax, sternal fracture, L iliac wing fracture with hematoma S/P 1U pRBCs--WBAT Left LE, L forehead laceration S/P suture repair, L pinky PIP dislocation S/P closed reduction with delfin splint, L clavicle fracture-- NWB Left UE, L nasal bone and L orbital rim fractures, and sternal fracture. No surgical intervention.  Admitted to rehab with cognitive deficits, gait and ADL impairment.    Patient seen and evaluated by psych, denied mood or appetite disturbances but admitted to difficulty with sleep. Patient presenting with flat affect and cognitive slowing s/p TBI. Unable to obtain collateral history today. See recommendations below.

## 2021-04-30 NOTE — BH CONSULTATION LIAISON ASSESSMENT NOTE - NSBHCONSULTRECOMMENDOTHER_PSY_A_CORE FT
Increase dose of Melatonin to 6mg QHS.    Consider trial of Modafinil (50mg for 3 days then increase to 100mg) for cognitive stimulation    Monitor for response to treatment (i.e. sleep, cognition/affect), adverse reactions. Will continue to follow.

## 2021-04-30 NOTE — BH CONSULTATION LIAISON ASSESSMENT NOTE - NSBHCHARTREVIEWVS_PSY_A_CORE FT
Vital Signs Last 24 Hrs  T(C): 36.9 (30 Apr 2021 07:47), Max: 36.9 (29 Apr 2021 15:16)  T(F): 98.4 (30 Apr 2021 07:47), Max: 98.5 (29 Apr 2021 20:35)  HR: 68 (30 Apr 2021 07:47) (68 - 73)  BP: 102/65 (30 Apr 2021 07:47) (102/61 - 103/68)  BP(mean): --  RR: 14 (30 Apr 2021 07:47) (14 - 14)  SpO2: 94% (30 Apr 2021 07:47) (93% - 94%)

## 2021-04-30 NOTE — BH CONSULTATION LIAISON ASSESSMENT NOTE - NSBHMSEMOVE_PSY_A_CORE
RE: Device at [facility]  Received: Today  Message Contents   MD Nadia Avery, RN   Cc: Peggy Somers, RN             No we can just proceed with gen change.     GP    Previous                    Pt has reached [facility] on SJM ICD/LV epicardial lead per Merlin.  Hx of SR. Last in office visit for device and clinician was 8/30/18. Called pt and informed him of [facility] status.      Pt thinks he was exposed to Covid 2 weeks ago and has been unable to get tested. PAPA Lara sent message to his PCP to contact him for testing. That has not been done yet.     Will message Dr. Toribio to see how he wants to proceed.    
Tremors

## 2021-04-30 NOTE — BH CONSULTATION LIAISON ASSESSMENT NOTE - NSBHCHARTREVIEWINVESTIGATE_PSY_A_CORE FT
< from: 12 Lead ECG (04.22.21 @ 13:54) >      Ventricular Rate 68 BPM    Atrial Rate 68 BPM    P-R Interval 138 ms    QRS Duration 84 ms    Q-T Interval 378 ms    QTC Calculation(Bazett) 401 ms    P Axis 26 degrees    R Axis 29 degrees    T Axis 43 degrees    Diagnosis Line NORMAL SINUS RHYTHM  NORMAL ECG  WHEN COMPARED WITH ECG OF 22-APR-2021 13:53, (UNCONFIRMED)  SIGNIFICANT CHANGES HAVE OCCURRED  Confirmed by ARTURO SYED SONIA ([id number]) on 4/24/2021 7:31:57 AM    < end of copied text >  < from: MR Lumbar Spine No Cont (04.29.21 @ 14:05) >    IMPRESSION:  Acute to subacute benign compression fracture of L3 and left sacral insufficiency fracture.              NILESH VILLALPANDO MD; Attending Radiologist  This document has been electronically signed. Apr 29 2021  2:17PM  EXAM:  MR SPINE LUMBAR      PROCEDURE DATE:  04/29/2021        INTERPRETATION:  MRI lumbar spine without contrast    < end of copied text >

## 2021-04-30 NOTE — BH CONSULTATION LIAISON ASSESSMENT NOTE - RISK ASSESSMENT
Patient without known psych history or known risk factors. Supported by family, no access to lethal means, on enhanced supervision for fall risk.

## 2021-04-30 NOTE — PROGRESS NOTE ADULT - ASSESSMENT
64 yo woman with PMHx breast cancer s/p mastectomy, HTN, Hypothyroidism admitted to [facility] after hospital course at [facility] after a fall from a flight of stairs.    #Impaired gait, mobility, ADL  #Traumatic Fall  #SAH, left-sided rib fractures, left iliac wing fracture with hematoma, left forehead laceration, s/p successful closed reduction of left pinky PIP dislocation.    #Acute L3 compression fracture, 4/29  - Comprehensive rehab program of PT/OT/SLP - 3 hours a day, 5 days a week  - Continue Keppra for 7 days total  - Continue Lexapro  - Monitor H/H  - follow Vit D level  - Fall, Seizure, Bleeding Precautions  - Bowel regimen as per primary team  - Pain meds as per primary team   - PT fitted today for LSO brace; to be worn only when OOB and sitting, not when sleeping    #Leukocytosis, improved today  - UA is neg for infection  - likely reactive  - continue to monitor  - if fever spike, send septic work up including BCX x 2, lactic acid, CXR    #Normocytic Anemia with folic acid deficiency  - possible blood loss anemia s/p traumatic Fall  - baseline H/H 13.2/41.2 (4/19)  - monitor H/H  - Transfuse to keep H/H > 7  - C/w folic acid 1mg po daily    #Elevated ALP and TB  - trend LFTs  - continue to monitor    #Acute Hypoxic Respiratory Failure likely due to Acute B/L rib fractures, and small pneumothorax  - Resolved. The last 24hrs, O2 sat >94%  - Incentive spirometry.   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 Hernández [phone]. (elective ablation)    #Hypothyroidism  - Continue Synthroid 112mcg daily  - TSH 1.2 (4/22)    #DVT PPX - Lovenox    Discussed with Rehab Attending, Dr. Soto during rounds

## 2021-04-30 NOTE — PROGRESS NOTE ADULT - SUBJECTIVE AND OBJECTIVE BOX
Patient is a 65y old  Female who presents with a chief complaint of SAH and fractures of left-sided ribs, left iliac wing with hematoma, S/P closed reduction of left pinky PIP dislocation after trauma  2.2 (2021 08:33)      HPI:  65F PMHx breast cancer s/p mastectomy, HTN, hypothyroidism, not on full anticoagulation/antiplatelets per EMS, presented to [facility] 21as a level 2 trauma activation after a fall from a flight of stairs. Patient only able to say "ow" in trauma bay, unable to provide further history. However per son, normal mentation at baseline. In the trauma bay, airway was intact with bilateral breath sounds, O2 saturation 100% on room air. Initially blood pressure was 140s systolic however on repeat 90s. NS@100 initiated. Secondary significant for left forehead laceration, left chest wall tenderness, left pelvic tenderness.     Ms. Arthur was hypotensive in the trauma bay and was transferred to the Surgical ICU after imaging was obtained. Her injury list is below.  (1) SAH, bilateral: stable on repeat imaging. Seven day course of Keppra for post-traumatic seizure prophylaxis.  (2) left 3-8 and right 7th rib fractures with occult left pneumothorax: pain control  (3) left iliac wing fracture with hematoma: required transfusion early in her ICU course, after which CBC were stable  (4) left forehead laceration s/p suture repair  (5) s/p successful closed reduction of left pinky PIP dislocation with delfin splint  (6) left clavicle fracture  (7) left nasal bone and left orbital rim fractures  (8) sternal fracture  Incidental findings: right renal lesion. Pt's  was notified and copy of the report given to him.     The patient was admitted to Trauma Surgery under SICU care.  Ortho was consulted for L. iliac wing fx, sternal fx, L. clavicle fx- recommended WBAT of the affected left lower extremity with walker, WBAT L shoulder, pendulum exercises okay, no lifting, sling for comfort, Encourage ROM of elbow/wrist/hand, PT/OT consult, NO urgent orthopedic surgical intervention at this time.    Neurosurgery consulted- recommended repeat CTH, which was stable.     PRS was consulted for non displaced left superior orbital rim and non displaced left nasal bone fracture--> No indication for operative fixation of these non displaced fractures; recommend ice to face to aid in swelling and analgesia.    She was placed on hemorrhage watch in the SICU given pelvic fracture and surrounding hematoma.  Behavior health was consulted for delirium.    Hand was consulted for L. ofeliay PIP dislocation- s/p closed reduction, rec Cont delfin taping for now.    - CTH stable  - Added home lexapro  - Added NS @ 50cc/hr to supplement poor PO intake  - Lactate cleared (1.9)    - Transfused 1u pRBC, Hct 20.9 -> 26.9  - Went into SVT, given adenosine x1 and started on metoprolol 25mg q12hr  - Added oxycodone 5mg q4hrs prn for increased pain control  - PO intake improved, IVL    - EP was consulted for SVT, recc continue lopressor 25mg   Metoprolol PO increased from 25mg q12hrs to 25mg q6hrs.   - Added salt tabs 1g q8hrs and started on NS @ 50cc/hr for hyponatremia  - Spangler discontinued, passed TOV  - Added lovenox  - Cervical collar cleared by confrontational exam     - Patient transferred from SICU to surgical floor in stable condition.  Discharge planning to acute rehab.     Stable from neurologic perspective consistent with mild TBI  Multiple rib fractures - breathing comfortable with multimodal pain control    On day of discharge, the patient was tolerating diet, working with PT well and pain controlled. The patient was medically stable for discharge to acute rehab with instructions for outpatient follow up 21. 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 Patient has TLSO at bedside. This AM, she c/o pain in low back. She did not sleep well overnight, unsure of why.     Vital Signs Last 24 Hrs  T(C): 36.9 (2021 07:47), Max: 36.9 (2021 15:16)  T(F): 98.4 (2021 07:47), Max: 98.5 (2021 20:35)  HR: 68 (2021 07:47) (68 - 73)  BP: 102/65 (2021 07:47) (102/61 - 103/68)  BP(mean): --  RR: 14 (2021 07:47) (14 - 14)  SpO2: 94% (2021 07:47) (93% - 94%)    Gen - NAD, Comfortable  HEENT - periorbital ecchymoses, EOMI, MMM  Neck - No cervical tenderness  Pulm - CTAB, No wheeze  Cardiovascular - RRR, S1S2, No m/r/g  Abdomen - Soft, NT/ND, +BS  Extremities - No C/C/E, No calf tenderness  Neuro-     Cognitive - AAOx2 to person and year. States it is , and unsure of location.     Communication - Fluent, delayed. Able to repeat.      Attention: Impaired. Requires frequent repetition and cueing.      Memory: Recall 3/3 objects immediate and 0/3 5 min later         Cranial Nerves - CN 2-12 intact     Motor -                    LEFT    UE - Limited due to left clavicle fracture. Able to wiggle fingers and extend wrist and bend elbow about 3-4/5.                     RIGHT UE - ShAB 5/5, EF 5/5, EE 5/5, WE 5/5,  5/5                    LEFT    LE - HF limited due to pain--antigravity strength, KE 5/5, DF 5/5, PF 5/5                    RIGHT LE - HF 5/5, KE 5/5, DF 5/5, PF 5/5        Sensory - Intact to LT     Reflexes - DTR Intact, No primitive reflex     Coordination - FTN intact     Tone - normal  Psychiatric - Mood stable, Affect WNL  Skin: scattered ecchymoses all over body in various states of healing.   Wounds: blanachable irritation along inner thighs and buttocks.        RECENT LABS:                          10.4   14.93 )-----------( 280      ( 2021 05:30 )             31.5     04-    141  |  106  |  14  ----------------------------<  119<H>  3.6   |  24  |  0.51    Ca    8.5      2021 05:30    TPro  6.3  /  Alb  2.9<L>  /  TBili  1.3<H>  /  DBili  x   /  AST  16  /  ALT  30  /  AlkPhos  136<H>  04-29    LIVER FUNCTIONS - ( 2021 05:30 )  Alb: 2.9 g/dL / Pro: 6.3 g/dL / ALK PHOS: 136 U/L / ALT: 30 U/L / AST: 16 U/L / GGT: x             Urinalysis Basic - ( 2021 16:45 )    Color: Yellow / Appearance: very cloudy / S.015 / pH: x  Gluc: x / Ketone: Negative  / Bili: Negative / Urobili: Negative   Blood: x / Protein: 30 mg/dL / Nitrite: Positive   Leuk Esterase: Small / RBC: 0-4 /HPF / WBC 3-5 /HPF   Sq Epi: x / Non Sq Epi: Neg.-Few / Bacteria: Moderate /HPF                            9.6    13.22 )-----------( 280      ( 2021 05:00 )             29.4         Urinalysis Basic - ( 2021 16:45 )    Color: Yellow / Appearance: very cloudy / S.015 / pH: x  Gluc: x / Ketone: Negative  / Bili: Negative / Urobili: Negative   Blood: x / Protein: 30 mg/dL / Nitrite: Positive   Leuk Esterase: Small / RBC: 0-4 /HPF / WBC 3-5 /HPF   Sq Epi: x / Non Sq Epi: Neg.-Few / Bacteria: Moderate /HPF          MEDICATIONS  (STANDING):  bethanechol 5 milliGRAM(s) Oral two times a day  enoxaparin Injectable 40 milliGRAM(s) SubCutaneous every 24 hours  escitalopram 10 milliGRAM(s) Oral daily  folic acid 1 milliGRAM(s) Oral daily  levothyroxine 112 MICROGram(s) Oral daily  lidocaine   Patch 1 Patch Transdermal daily  melatonin 3 milliGRAM(s) Oral at bedtime  metoprolol succinate  milliGRAM(s) Oral daily  multivitamin 1 Tablet(s) Oral daily  nystatin Powder 1 Application(s) Topical two times a day  senna 2 Tablet(s) Oral at bedtime  zinc oxide 40% Ointment 1 Application(s) Topical two times a day    MEDICATIONS  (PRN):  acetaminophen   Tablet .. 650 milliGRAM(s) Oral every 6 hours PRN Temp greater or equal to 38C (100.4F), Mild Pain (1 - 3)  oxyCODONE    IR 5 milliGRAM(s) Oral every 4 hours PRN Moderate Pain (4 - 6)  oxyCODONE    IR 10 milliGRAM(s) Oral every 6 hours PRN Severe Pain (7 - 10)  polyethylene glycol 3350 17 Gram(s) Oral daily PRN Constipation         Patient is a 65y old  Female who presents with a chief complaint of SAH and fractures of left-sided ribs, left iliac wing with hematoma, S/P closed reduction of left pinky PIP dislocation after trauma  2.2 (2021 08:33)      HPI:  65F PMHx breast cancer s/p mastectomy, HTN, hypothyroidism, not on full anticoagulation/antiplatelets per EMS, presented to [facility] 21as a level 2 trauma activation after a fall from a flight of stairs. Patient only able to say "ow" in trauma bay, unable to provide further history. However per son, normal mentation at baseline. In the trauma bay, airway was intact with bilateral breath sounds, O2 saturation 100% on room air. Initially blood pressure was 140s systolic however on repeat 90s. NS@100 initiated. Secondary significant for left forehead laceration, left chest wall tenderness, left pelvic tenderness.     Ms. Arthur was hypotensive in the trauma bay and was transferred to the Surgical ICU after imaging was obtained. Her injury list is below.  (1) SAH, bilateral: stable on repeat imaging. Seven day course of Keppra for post-traumatic seizure prophylaxis.  (2) left 3-8 and right 7th rib fractures with occult left pneumothorax: pain control  (3) left iliac wing fracture with hematoma: required transfusion early in her ICU course, after which CBC were stable  (4) left forehead laceration s/p suture repair  (5) s/p successful closed reduction of left pinky PIP dislocation with delfin splint  (6) left clavicle fracture  (7) left nasal bone and left orbital rim fractures  (8) sternal fracture  Incidental findings: right renal lesion. Pt's  was notified and copy of the report given to him.     The patient was admitted to Trauma Surgery under SICU care.  Ortho was consulted for L. iliac wing fx, sternal fx, L. clavicle fx- recommended WBAT of the affected left lower extremity with walker, WBAT L shoulder, pendulum exercises okay, no lifting, sling for comfort, Encourage ROM of elbow/wrist/hand, PT/OT consult, NO urgent orthopedic surgical intervention at this time.    Neurosurgery consulted- recommended repeat CTH, which was stable.     PRS was consulted for non displaced left superior orbital rim and non displaced left nasal bone fracture--> No indication for operative fixation of these non displaced fractures; recommend ice to face to aid in swelling and analgesia.    She was placed on hemorrhage watch in the SICU given pelvic fracture and surrounding hematoma.  Behavior health was consulted for delirium.    Hand was consulted for L. ofeliay PIP dislocation- s/p closed reduction, rec Cont delfin taping for now.    - CTH stable  - Added home lexapro  - Added NS @ 50cc/hr to supplement poor PO intake  - Lactate cleared (1.9)    - Transfused 1u pRBC, Hct 20.9 -> 26.9  - Went into SVT, given adenosine x1 and started on metoprolol 25mg q12hr  - Added oxycodone 5mg q4hrs prn for increased pain control  - PO intake improved, IVL    - EP was consulted for SVT, recc continue lopressor 25mg   Metoprolol PO increased from 25mg q12hrs to 25mg q6hrs.   - Added salt tabs 1g q8hrs and started on NS @ 50cc/hr for hyponatremia  - Spangler discontinued, passed TOV  - Added lovenox  - Cervical collar cleared by confrontational exam     - Patient transferred from SICU to surgical floor in stable condition.  Discharge planning to acute rehab.     Stable from neurologic perspective consistent with mild TBI  Multiple rib fractures - breathing comfortable with multimodal pain control    On day of discharge, the patient was tolerating diet, working with PT well and pain controlled. The patient was medically stable for discharge to acute rehab with instructions for outpatient follow up 21. 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 Patient has LSO at bedside. This AM, she c/o pain in low back. She did not sleep well overnight, unsure of why. PCA reports did not sleep all night but nursing shift notes indicate pt. slept...    Vital Signs Last 24 Hrs  T(C): 36.9 (2021 07:47), Max: 36.9 (2021 15:16)  T(F): 98.4 (2021 07:47), Max: 98.5 (2021 20:35)  HR: 68 (2021 07:47) (68 - 73)  BP: 102/65 (2021 07:47) (102/61 - 103/68)  BP(mean): --  RR: 14 (2021 07:47) (14 - 14)  SpO2: 94% (2021 07:47) (93% - 94%)    Gen - NAD, Comfortable  HEENT - periorbital ecchymoses, EOMI, MMM  Neck - No cervical tenderness  Pulm - CTAB, No wheeze  Cardiovascular - RRR, S1S2, No m/r/g  Abdomen - Soft, NT/ND, +BS  Extremities - No C/C/E, No calf tenderness  Neuro-     Cognitive - AAOx2 and Knew  year.      Communication - Fluent, delayed. Able to repeat.      Attention: Impaired. Requires frequent repetition and cueing.      Memory: Recall 3/3 objects immediate and 0/3 5 min later         Cranial Nerves - CN 2-12 intact     Motor -                    LEFT    UE - Limited due to left clavicle fracture. Able to wiggle fingers and extend wrist and bend elbow about 3-4/5.                     RIGHT UE - ShAB 5/5, EF 5/5, EE 5/5, WE 5/5,  5/5                    LEFT    LE - HF limited due to pain--antigravity strength, KE 5/5, DF 5/5, PF 5/5                    RIGHT LE - HF 5/5, KE 5/5, DF 5/5, PF 5/5        Sensory - Intact to LT     Reflexes - DTR Intact, No primitive reflex     Coordination - FTN intact     Tone - normal  Psychiatric - Mood stable, Affect WNL  Skin: scattered ecchymoses all over body in various states of healing.   Wounds: blanachable irritation along inner thighs and buttocks.        RECENT LABS:                          10.4   14.93 )-----------( 280      ( 2021 05:30 )             31.5     04-    141  |  106  |  14  ----------------------------<  119<H>  3.6   |  24  |  0.51    Ca    8.5      2021 05:30    TPro  6.3  /  Alb  2.9<L>  /  TBili  1.3<H>  /  DBili  x   /  AST  16  /  ALT  30  /  AlkPhos  136<H>  04-29    LIVER FUNCTIONS - ( 2021 05:30 )  Alb: 2.9 g/dL / Pro: 6.3 g/dL / ALK PHOS: 136 U/L / ALT: 30 U/L / AST: 16 U/L / GGT: x             Urinalysis Basic - ( 2021 16:45 )    Color: Yellow / Appearance: very cloudy / S.015 / pH: x  Gluc: x / Ketone: Negative  / Bili: Negative / Urobili: Negative   Blood: x / Protein: 30 mg/dL / Nitrite: Positive   Leuk Esterase: Small / RBC: 0-4 /HPF / WBC 3-5 /HPF   Sq Epi: x / Non Sq Epi: Neg.-Few / Bacteria: Moderate /HPF                            9.6    13.22 )-----------( 280      ( 2021 05:00 )             29.4         Urinalysis Basic - ( 2021 16:45 )    Color: Yellow / Appearance: very cloudy / S.015 / pH: x  Gluc: x / Ketone: Negative  / Bili: Negative / Urobili: Negative   Blood: x / Protein: 30 mg/dL / Nitrite: Positive   Leuk Esterase: Small / RBC: 0-4 /HPF / WBC 3-5 /HPF   Sq Epi: x / Non Sq Epi: Neg.-Few / Bacteria: Moderate /HPF          MEDICATIONS  (STANDING):  bethanechol 5 milliGRAM(s) Oral two times a day  enoxaparin Injectable 40 milliGRAM(s) SubCutaneous every 24 hours  escitalopram 10 milliGRAM(s) Oral daily  folic acid 1 milliGRAM(s) Oral daily  levothyroxine 112 MICROGram(s) Oral daily  lidocaine   Patch 1 Patch Transdermal daily  melatonin 3 milliGRAM(s) Oral at bedtime  metoprolol succinate  milliGRAM(s) Oral daily  multivitamin 1 Tablet(s) Oral daily  nystatin Powder 1 Application(s) Topical two times a day  senna 2 Tablet(s) Oral at bedtime  zinc oxide 40% Ointment 1 Application(s) Topical two times a day    MEDICATIONS  (PRN):  acetaminophen   Tablet .. 650 milliGRAM(s) Oral every 6 hours PRN Temp greater or equal to 38C (100.4F), Mild Pain (1 - 3)  oxyCODONE    IR 5 milliGRAM(s) Oral every 4 hours PRN Moderate Pain (4 - 6)  oxyCODONE    IR 10 milliGRAM(s) Oral every 6 hours PRN Severe Pain (7 - 10)  polyethylene glycol 3350 17 Gram(s) Oral daily PRN Constipation         Patient is a 65y old  Female who presents with a chief complaint of SAH and fractures of left-sided ribs, left iliac wing with hematoma, S/P closed reduction of left pinky PIP dislocation after trauma  (2021 08:33)      HPI:  65F PMHx breast cancer s/p mastectomy, HTN, hypothyroidism, not on full anticoagulation/antiplatelets per EMS, presented to [facility] 21as a level 2 trauma activation after a fall from a flight of stairs. Patient only able to say "ow" in trauma bay, unable to provide further history. However per son, normal mentation at baseline. In the trauma bay, airway was intact with bilateral breath sounds, O2 saturation 100% on room air. Initially blood pressure was 140s systolic however on repeat 90s. NS@100 initiated. Secondary significant for left forehead laceration, left chest wall tenderness, left pelvic tenderness.     Ms. Arthur was hypotensive in the trauma bay and was transferred to the Surgical ICU after imaging was obtained. Her injury list is below.  (1) SAH, bilateral: stable on repeat imaging. Seven day course of Keppra for post-traumatic seizure prophylaxis.  (2) left 3-8 and right 7th rib fractures with occult left pneumothorax: pain control  (3) left iliac wing fracture with hematoma: required transfusion early in her ICU course, after which CBC were stable  (4) left forehead laceration s/p suture repair  (5) s/p successful closed reduction of left pinky PIP dislocation with delfin splint  (6) left clavicle fracture  (7) left nasal bone and left orbital rim fractures  (8) sternal fracture  Incidental findings: right renal lesion. Pt's  was notified and copy of the report given to him.     The patient was admitted to Trauma Surgery under SICU care.  Ortho was consulted for L. iliac wing fx, sternal fx, L. clavicle fx- recommended WBAT of the affected left lower extremity with walker, WBAT L shoulder, pendulum exercises okay, no lifting, sling for comfort, Encourage ROM of elbow/wrist/hand, PT/OT consult, NO urgent orthopedic surgical intervention at this time.    Neurosurgery consulted- recommended repeat CTH, which was stable.     PRS was consulted for non displaced left superior orbital rim and non displaced left nasal bone fracture--> No indication for operative fixation of these non displaced fractures; recommend ice to face to aid in swelling and analgesia.    She was placed on hemorrhage watch in the SICU given pelvic fracture and surrounding hematoma.  Behavior health was consulted for delirium.    Hand was consulted for L. pinky PIP dislocation- s/p closed reduction, rec Cont delfin taping for now.    - CTH stable  - Added home lexapro  - Added NS @ 50cc/hr to supplement poor PO intake  - Lactate cleared (1.9)    - Transfused 1u pRBC, Hct 20.9 -> 26.9  - Went into SVT, given adenosine x1 and started on metoprolol 25mg q12hr  - Added oxycodone 5mg q4hrs prn for increased pain control  - PO intake improved, IVL    - EP was consulted for SVT, recc continue lopressor 25mg   Metoprolol PO increased from 25mg q12hrs to 25mg q6hrs.   - Added salt tabs 1g q8hrs and started on NS @ 50cc/hr for hyponatremia  - Spangler discontinued, passed TOV  - Added lovenox  - Cervical collar cleared by confrontational exam     - Patient transferred from SICU to surgical floor in stable condition.  Discharge planning to acute rehab.     Stable from neurologic perspective consistent with mild TBI  Multiple rib fractures - breathing comfortable with multimodal pain control    On day of discharge, the patient was tolerating diet, working with PT well and pain controlled. The patient was medically stable for discharge to acute rehab with instructions for outpatient follow up 21. 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 Patient has LSO at bedside. This AM, she c/o pain in low back. She did not sleep well overnight, unsure of why. PCA reports did not sleep all night but nursing shift notes indicate pt. slept...    Vital Signs Last 24 Hrs  T(C): 36.9 (2021 07:47), Max: 36.9 (2021 15:16)  T(F): 98.4 (2021 07:47), Max: 98.5 (2021 20:35)  HR: 68 (2021 07:47) (68 - 73)  BP: 102/65 (2021 07:47) (102/61 - 103/68)  BP(mean): --  RR: 14 (2021 07:47) (14 - 14)  SpO2: 94% (2021 07:47) (93% - 94%)    Gen - NAD, Comfortable  HEENT - periorbital ecchymoses, EOMI, MMM  Neck - No cervical tenderness  Pulm - CTAB, No wheeze  Cardiovascular - RRR, S1S2, No m/r/g  Abdomen - Soft, NT/ND, +BS  Extremities - No C/C/E, No calf tenderness  Neuro-     Cognitive - AAOx2 and Knew  year.      Communication - Fluent, delayed. Able to repeat.      Attention: Impaired. Requires frequent repetition and cueing.      Memory: Recall 3/3 objects immediate and 0/3 5 min later         Cranial Nerves - CN 2-12 intact     Motor -                    LEFT    UE - Limited due to left clavicle fracture. Able to wiggle fingers and extend wrist and bend elbow about 3-4/5.                     RIGHT UE - ShAB 5/5, EF 5/5, EE 5/5, WE 5/5,  5/5                    LEFT    LE - HF limited due to pain--antigravity strength, KE 5/5, DF 5/5, PF 5/5                    RIGHT LE - HF 5/5, KE 5/5, DF 5/5, PF 5/5        Sensory - Intact to LT     Reflexes - DTR Intact, No primitive reflex     Coordination - FTN intact     Tone - normal  Psychiatric - Mood stable, Affect WNL  Skin: scattered ecchymoses all over body in various states of healing.   Wounds: blanachable irritation along inner thighs and buttocks.        RECENT LABS:                          10.4   14.93 )-----------( 280      ( 2021 05:30 )             31.5     -    141  |  106  |  14  ----------------------------<  119<H>  3.6   |  24  |  0.51    Ca    8.5      2021 05:30    TPro  6.3  /  Alb  2.9<L>  /  TBili  1.3<H>  /  DBili  x   /  AST  16  /  ALT  30  /  AlkPhos  136<H>  04-29    LIVER FUNCTIONS - ( 2021 05:30 )  Alb: 2.9 g/dL / Pro: 6.3 g/dL / ALK PHOS: 136 U/L / ALT: 30 U/L / AST: 16 U/L / GGT: x             Urinalysis Basic - ( 2021 16:45 )    Color: Yellow / Appearance: very cloudy / S.015 / pH: x  Gluc: x / Ketone: Negative  / Bili: Negative / Urobili: Negative   Blood: x / Protein: 30 mg/dL / Nitrite: Positive   Leuk Esterase: Small / RBC: 0-4 /HPF / WBC 3-5 /HPF   Sq Epi: x / Non Sq Epi: Neg.-Few / Bacteria: Moderate /HPF                            9.6    13.22 )-----------( 280      ( 2021 05:00 )             29.4         Urinalysis Basic - ( 2021 16:45 )    Color: Yellow / Appearance: very cloudy / S.015 / pH: x  Gluc: x / Ketone: Negative  / Bili: Negative / Urobili: Negative   Blood: x / Protein: 30 mg/dL / Nitrite: Positive   Leuk Esterase: Small / RBC: 0-4 /HPF / WBC 3-5 /HPF   Sq Epi: x / Non Sq Epi: Neg.-Few / Bacteria: Moderate /HPF          MEDICATIONS  (STANDING):  bethanechol 5 milliGRAM(s) Oral two times a day  enoxaparin Injectable 40 milliGRAM(s) SubCutaneous every 24 hours  escitalopram 10 milliGRAM(s) Oral daily  folic acid 1 milliGRAM(s) Oral daily  levothyroxine 112 MICROGram(s) Oral daily  lidocaine   Patch 1 Patch Transdermal daily  melatonin 3 milliGRAM(s) Oral at bedtime  metoprolol succinate  milliGRAM(s) Oral daily  multivitamin 1 Tablet(s) Oral daily  nystatin Powder 1 Application(s) Topical two times a day  senna 2 Tablet(s) Oral at bedtime  zinc oxide 40% Ointment 1 Application(s) Topical two times a day    MEDICATIONS  (PRN):  acetaminophen   Tablet .. 650 milliGRAM(s) Oral every 6 hours PRN Temp greater or equal to 38C (100.4F), Mild Pain (1 - 3)  oxyCODONE    IR 5 milliGRAM(s) Oral every 4 hours PRN Moderate Pain (4 - 6)  oxyCODONE    IR 10 milliGRAM(s) Oral every 6 hours PRN Severe Pain (7 - 10)  polyethylene glycol 3350 17 Gram(s) Oral daily PRN Constipation

## 2021-04-30 NOTE — BH CONSULTATION LIAISON ASSESSMENT NOTE - NSSUICPROTFACT_PSY_ALL_CORE
Responsibility to children, family, or others/Supportive social network of family or friends/Taoist beliefs

## 2021-04-30 NOTE — BH CONSULTATION LIAISON ASSESSMENT NOTE - NSBHCHARTREVIEWLAB_PSY_A_CORE FT
Complete Blood Count + Automated Diff (04.30.21 @ 05:00)    WBC Count: 13.22 K/uL    RBC Count: 3.16 M/uL    Hemoglobin: 9.6 g/dL    Hematocrit: 29.4 %    Mean Cell Volume: 93.0 fl    Mean Cell Hemoglobin: 30.4 pg    Mean Cell Hemoglobin Conc: 32.7 gm/dL    Red Cell Distrib Width: 15.3 %    Platelet Count - Automated: 280 K/uL    Auto Neutrophil #: 10.86 K/uL    Auto Lymphocyte #: 1.50 K/uL    Auto Monocyte #: 0.62 K/uL    Auto Eosinophil #: 0.13 K/uL    Auto Basophil #: 0.02 K/uL    Auto Neutrophil %: 82.1: Differential percentages must be correlated with absolute numbers for  clinical significance. %    Auto Lymphocyte %: 11.3 %    Auto Monocyte %: 4.7 %    Auto Eosinophil %: 1.0 %    Auto Basophil %: 0.2 %    Auto Immature Granulocyte %: 0.7: (Includes meta, myelo and promyelocytes) %    Nucleated RBC: 0 /100 WBCs    04-29    141  |  106  |  14  ----------------------------<  119<H>  3.6   |  24  |  0.51    Ca    8.5      29 Apr 2021 05:30    TPro  6.3  /  Alb  2.9<L>  /  TBili  1.3<H>  /  DBili  x   /  AST  16  /  ALT  30  /  AlkPhos  136<H>  04-29    Iron with Total Binding Capacity in AM (04.29.21 @ 05:30)    Iron - Total Binding Capacity.: 279 ug/dL    % Saturation, Iron: 18 %    Iron Total, Serum: 51 ug/dL    Unsaturated Iron Binding Capacity: 228 ug/dL    Folate, Serum in AM (04.29.21 @ 05:30)    Folate, Serum: 2.6 ng/mL  Vitamin D, 1, 25-Dihydroxy (04.29.21 @ 05:30)    Vitamin D, 1, 25-Dihydroxy: 37.8 pg/mL

## 2021-04-30 NOTE — PROGRESS NOTE ADULT - ASSESSMENT
Assessment/Plan:  JONATHAN CHILD is a 65y with PMHx breast Ca S/P mastectomy, HTN, hypothyroidism presented to [facility] 4/19/21 as level 2 trauma after falling down a flight of stairs. She was found to have bilateral frontoparietal SAH, L 3-8 and R 7th rib fractures with occult L pneumothorax, sternal fracture, L iliac wing fracture with hematoma S/P 1U pRBCs--WBAT Left LE, L forehead laceration S/P suture repair, L pinky PIP dislocation S/P closed reduction with buddy splint, L clavicle fracture-- NWB Left UE, L nasal bone and L orbital rim fractures, and sternal fracture. No surgical intervention.  Admitted to rehab with cognitive deficits, gait and ADL impairment.      Comprehensive Multidisciplinary Rehab Program:  - Start comprehensive rehab program, PT/OT/SLP 3 hours a day, 5 days a week.  - WBAT LLE, NWB LUE but ok to do ROM exercises for L elbow, wrist, hand and L shoulder pendulum exercises  - Precautions: falls, sternal     #SAH  - S/P Keppra 7 day course for seizure ppx  - Avoid NSAIDs  - F/u neurosurgery, Dr. Vikas Aquino, upon discharge    #Multiple fractures  - L 3-8 and R 7th rib fractures with occult L pneumothorax, sternal fracture, L iliac wing fracture with hematoma S/P 1U pRBCs, L forehead laceration S/P suture repair, L pinky PIP dislocation S/P closed reduction with buddy splint, L clavicle fracture, L nasal bone and L orbital rim fractures, and sternal fracture  - Sling to LUE for comfort. No lifting more than 1-3 pounds for 6 weeks.  - Continue Lovenox for 6 weeks total (4/22-6/3)  - Tylenol, Oxycodone PRN for pain control. Lidoderm to L chest wall.  - F/u plastic surgeon Dr. Nell Choe, for facial laceration, L pinky dislocation, L nasal bone and orbital rim fractures  - F/u ortho, Dr. Reji Cardoza, for L clavicle and L pelvic wing fractures  - F/u surgery, Dr. Dino Peña, for rib fractures  - CT T/L spine shows T5 compression fracture of indeterminate age and acute L3 fracture. Placed on bedrest. MRI pending today to r/o cord compression. No clinical signs of cord compression other than urinary retention. Ortho consulted, recommended TLSO. Orthotist fit patient with TLSO 4/29, brace at bedside. Continue current pain regimen.    #Depression  - Per , patient was exhibiting signs of depression even before admission.  - On Lexapro 10mg daily.  - Psych consulted.     #Sleep  - Started on Melatonin 3mg QHS.    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 Morgan Hernández, outpatient in 3 months, may consider elective ablation.    #R kidney lesion  - F/u Dr. Herrera outpatient or PCP    #Hypothyroidism  - Continue Synthroid 112mcg daily    GI/Bowel:  - At risk for constipation due to neurologic diagnosis, immobility and/or medication use  - Senna QHS, Miralax PRN Daily    /Bladder:   - At risk for incontinence and retention due to neurologic diagnosis and limited mobility  - Currently patient has urinary retention with SC Q6hr up to 1150cc.  - Spangler placed 4/29/21    Skin/Pressure Injury:   - At risk for pressure injury due to neurologic diagnosis and relative immobility.  - Skin assessment on admission: incontinence associated dermatitis at inner thighs, groin and buttocks. Start desitin and nystatin powder to affected areas.   - Monitor Incisions: L facial laceration  - Turn every 2 hours while in bed, air mattress  - Soft heel protectors  - Skin barrier cream as needed  - Nursing to monitor skin Qshift    DVT ppx:  - Lovenox  - SCDs    IDT 4/29/21:  - SW: lives with son and spouse, has HHA for iADLs but able to do ADLs independently. Owns cane and RW.  - OT: Mark eating, totalA dressing and toileting, maxA toilet transfers. Goal: supervision grooming, bathing LBD, Barb UBD and toilet transfers, Mark shower transfers  - PT: maxA transfer. No stairs or ambulation attempted. Goals: Mark in 3 weeks.  - SLP: regular/thins. Pending full SLP eval but visual deficits ?L neglect, mild-mod cognitive deficits, decreased attention and memory.  - Barrier: back pain  - EDOD: 5/19 to home  ---------------  Outpatient Follow-up (Specialty/Name of physician):  Silvina Herrera  INTERNAL MEDICINE  [address] [place] [address]  Phone: [phone]  Fax: [phone]  Follow Up Time: 1 week    Vikas Aquino)  Neurosurgery  [address] [facility], [address] [place] [address]  Phone: [phone]  Fax: [phone]  Follow Up Time: 2 weeks    Nell Choe)  Plastic Surgery  [address] [facility], [address]  [place], [place] [address]  Phone: [phone]  Fax: [phone]  Follow Up Time: 1 week    Reji Cardoza)  Orthopaedic Surgery  [address] [facility], [facility] [address]  [place] [address]  Phone: [phone]  Fax: [phone]  Follow Up Time: Routine    Dino Peña)  Surgery; Surgical Critical Care  [address] [facility], [address], [place] [address]  Phone: [phone]  Fax: [phone]  Follow Up Time: Routine    Morgan Hernández; PhD)  Cardiac Electrophysiology; Cardiovascular Disease; Internal Medicine  [facility] - Dept of Cardiology, [address] [place] [address]  Phone: [phone]  Fax: [phone]   Assessment/Plan:  JONATHAN CHILD is a 65y with PMHx breast Ca S/P mastectomy, HTN, hypothyroidism presented to [facility] 4/19/21 as level 2 trauma after falling down a flight of stairs. She was found to have bilateral frontoparietal SAH, L 3-8 and R 7th rib fractures with occult L pneumothorax, sternal fracture, L iliac wing fracture with hematoma S/P 1U pRBCs--WBAT Left LE, L forehead laceration S/P suture repair, L pinky PIP dislocation S/P closed reduction with buddy splint, L clavicle fracture-- NWB Left UE, L nasal bone and L orbital rim fractures, and sternal fracture. No surgical intervention.  Admitted to rehab with cognitive deficits, gait and ADL impairment.      Comprehensive Multidisciplinary Rehab Program:  - Cont comprehensive rehab program, PT/OT/SLP 3 hours a day, 5 days a week.  - WBAT LLE, NWB LUE but ok to do ROM exercises for L elbow, wrist, hand and L shoulder pendulum exercises  --LSO brace whenever OOB  - Precautions: falls, sternal     #SAH  - S/P Keppra 7 day course for seizure ppx  - Avoid NSAIDs  - F/u neurosurgery, Dr. Vikas Aquino, upon discharge    #Multiple fractures  - L 3-8 and R 7th rib fractures with occult L pneumothorax, sternal fracture, L iliac wing fracture with hematoma S/P 1U pRBCs, L forehead laceration S/P suture repair, L pinky PIP dislocation S/P closed reduction with buddy splint, L clavicle fracture, L nasal bone and L orbital rim fractures, and sternal fracture  - Sling to LUE for comfort. No lifting more than 1-3 pounds for 6 weeks.  - Continue Lovenox for 6 weeks total (4/22-6/3)  - Tylenol, Oxycodone PRN for pain control. Lidoderm to L chest wall.  - F/u plastic surgeon Dr. Nell Choe, for facial laceration, L pinky dislocation, L nasal bone and orbital rim fractures  - F/u ortho, Dr. Reji Cardoza, for L clavicle and L pelvic wing fractures  - F/u surgery, Dr. Dino Peña, for rib fractures  - CT T/L spine shows T5 compression fracture of indeterminate age and acute L3 fracture.  MRI 4/29--Acute to subacute benign compression fracture of L3 and left sacral insufficiency fracture.  No Cord Compression.  --Ortho consult Appreciated-- recommended LSO. Orthotist fit patient with LSO 4/29.  Needs LSO whenever OOB or sitting.  No laying down or sleeping in LSO.   -- Continue current pain regimen.    #Depression  - Per , patient was exhibiting signs of depression even before admission.  - On Lexapro 10mg daily.  - Psych consulted.     #Sleep  - Started on Melatonin 3mg QHS.    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 Morgan Hernández, outpatient in 3 months, may consider elective ablation.    #R kidney lesion  - F/u Dr. Herrera outpatient or PCP    #Hypothyroidism  - Continue Synthroid 112mcg daily    GI/Bowel:  - At risk for constipation due to neurologic diagnosis, immobility and/or medication use  - Senna QHS, Miralax PRN Daily    /Bladder:   - At risk for incontinence and retention due to neurologic diagnosis and limited mobility  - Currently patient has urinary retention with SC Q6hr up to 1150cc.  - Spangler placed 4/29/21    Skin/Pressure Injury:   - At risk for pressure injury due to neurologic diagnosis and relative immobility.  - Skin assessment on admission: incontinence associated dermatitis at inner thighs, groin and buttocks. Start desitin and nystatin powder to affected areas.   - Monitor Incisions: L facial laceration  - Turn every 2 hours while in bed, air mattress  - Soft heel protectors  - Skin barrier cream as needed  - Nursing to monitor skin Qshift    DVT ppx:  - Lovenox  - SCDs    IDT 4/29/21:  - SW: lives with son and spouse, has HHA for iADLs but able to do ADLs independently. Owns cane and RW.  - OT: Mark eating, totalA dressing and toileting, maxA toilet transfers. Goal: supervision grooming, bathing LBD, Barb UBD and toilet transfers, Mark shower transfers  - PT: maxA transfer. No stairs or ambulation attempted. Goals: Mark in 3 weeks.  - SLP: regular/thins. Pending full SLP eval but visual deficits ?L neglect, mild-mod cognitive deficits, decreased attention and memory.  - Barrier: back pain  - EDOD: 5/19 to home  ---------------  Outpatient Follow-up (Specialty/Name of physician):  Silvina Herrera  INTERNAL MEDICINE  [address] [place] [address]  Phone: [phone]  Fax: [phone]  Follow Up Time: 1 week    Vikas Aquino)  Neurosurgery  [address] [facility], [address] [place] [address]  Phone: [phone]  Fax: [phone]  Follow Up Time: 2 weeks    Nell Choe)  Plastic Surgery  [address] [facility] [address]  [place], [place] [address]  Phone: [phone]  Fax: [phone]  Follow Up Time: 1 week    Reji Cardoza)  Orthopaedic Surgery  [address] [facility] [address]  [place] [address]  Phone: [phone]  Fax: [phone]  Follow Up Time: Routine    Dino Peña)  Surgery; Surgical Critical Care  [address] [facility], [address], [place] [address]  Phone: [phone]  Fax: [phone]  Follow Up Time: Routine    Morgan Hernández; PhD)  Cardiac Electrophysiology; Cardiovascular Disease; Internal Medicine  [facility] - Dept of Cardiology, [address] [place] [address]  Phone: [phone]  Fax: [phone]

## 2021-04-30 NOTE — CHART NOTE - NSCHARTNOTEFT_GEN_A_CORE
Order received from surgical PA to measure and fit patient with LSO for control of lumbar fracture  Pt measured and brace adjusted. Pt sat up and brace applied to check fit  Pt to use only OOB and while sitting .  Do not sleep  or lie down with brace applied  Written instructions left in package at bedside  Follow up if needed    John SCHMITT  [phone]

## 2021-05-01 PROCEDURE — 99232 SBSQ HOSP IP/OBS MODERATE 35: CPT

## 2021-05-01 RX ADMIN — Medication 650 MILLIGRAM(S): at 12:00

## 2021-05-01 RX ADMIN — Medication 3 MILLIGRAM(S): at 21:04

## 2021-05-01 RX ADMIN — Medication 1 MILLIGRAM(S): at 12:18

## 2021-05-01 RX ADMIN — LIDOCAINE 1 PATCH: 4 CREAM TOPICAL at 14:43

## 2021-05-01 RX ADMIN — POLYETHYLENE GLYCOL 3350 17 GRAM(S): 17 POWDER, FOR SOLUTION ORAL at 12:19

## 2021-05-01 RX ADMIN — OXYCODONE HYDROCHLORIDE 10 MILLIGRAM(S): 5 TABLET ORAL at 08:03

## 2021-05-01 RX ADMIN — ENOXAPARIN SODIUM 40 MILLIGRAM(S): 100 INJECTION SUBCUTANEOUS at 05:24

## 2021-05-01 RX ADMIN — NYSTATIN CREAM 1 APPLICATION(S): 100000 CREAM TOPICAL at 05:25

## 2021-05-01 RX ADMIN — Medication 1 TABLET(S): at 12:18

## 2021-05-01 RX ADMIN — NYSTATIN CREAM 1 APPLICATION(S): 100000 CREAM TOPICAL at 17:19

## 2021-05-01 RX ADMIN — ZINC OXIDE 1 APPLICATION(S): 200 OINTMENT TOPICAL at 17:19

## 2021-05-01 RX ADMIN — LIDOCAINE 1 PATCH: 4 CREAM TOPICAL at 18:51

## 2021-05-01 RX ADMIN — Medication 100 MILLIGRAM(S): at 05:24

## 2021-05-01 RX ADMIN — OXYCODONE HYDROCHLORIDE 10 MILLIGRAM(S): 5 TABLET ORAL at 08:48

## 2021-05-01 RX ADMIN — Medication 5 MILLIGRAM(S): at 17:19

## 2021-05-01 RX ADMIN — OXYCODONE HYDROCHLORIDE 10 MILLIGRAM(S): 5 TABLET ORAL at 09:00

## 2021-05-01 RX ADMIN — Medication 5 MILLIGRAM(S): at 05:24

## 2021-05-01 RX ADMIN — OXYCODONE HYDROCHLORIDE 10 MILLIGRAM(S): 5 TABLET ORAL at 14:43

## 2021-05-01 RX ADMIN — ZINC OXIDE 1 APPLICATION(S): 200 OINTMENT TOPICAL at 05:28

## 2021-05-01 RX ADMIN — Medication 112 MICROGRAM(S): at 05:24

## 2021-05-01 RX ADMIN — SENNA PLUS 2 TABLET(S): 8.6 TABLET ORAL at 21:04

## 2021-05-01 RX ADMIN — LIDOCAINE 1 PATCH: 4 CREAM TOPICAL at 03:00

## 2021-05-01 RX ADMIN — Medication 650 MILLIGRAM(S): at 11:02

## 2021-05-01 RX ADMIN — ESCITALOPRAM OXALATE 10 MILLIGRAM(S): 10 TABLET, FILM COATED ORAL at 12:18

## 2021-05-01 RX ADMIN — OXYCODONE HYDROCHLORIDE 10 MILLIGRAM(S): 5 TABLET ORAL at 15:42

## 2021-05-01 NOTE — PROGRESS NOTE ADULT - SUBJECTIVE AND OBJECTIVE BOX
Patient is a 65y old  Female who presents with a chief complaint of SAH and fractures of left-sided ribs, left iliac wing with hematoma, S/P closed reduction of left pinky PIP dislocation after trauma (30 Apr 2021 10:33)      HPI:  65F PMHx breast cancer s/p mastectomy, HTN, hypothyroidism, not on full anticoagulation/antiplatelets per EMS, presented to [facility] 4/19/21as a level 2 trauma activation after a fall from a flight of stairs. Patient only able to say "ow" in trauma bay, unable to provide further history. However per son, normal mentation at baseline. In the trauma bay, airway was intact with bilateral breath sounds, O2 saturation 100% on room air. Initially blood pressure was 140s systolic however on repeat 90s. NS@100 initiated. Secondary significant for left forehead laceration, left chest wall tenderness, left pelvic tenderness.     Ms. Arthur was hypotensive in the trauma bay and was transferred to the Surgical ICU after imaging was obtained. Her injury list is below.  (1) SAH, bilateral: stable on repeat imaging. Seven day course of Keppra for post-traumatic seizure prophylaxis.  (2) left 3-8 and right 7th rib fractures with occult left pneumothorax: pain control  (3) left iliac wing fracture with hematoma: required transfusion early in her ICU course, after which CBC were stable  (4) left forehead laceration s/p suture repair  (5) s/p successful closed reduction of left pinky PIP dislocation with buddy splint  (6) left clavicle fracture  (7) left nasal bone and left orbital rim fractures  (8) sternal fracture  Incidental findings: right renal lesion. Pt's  was notified and copy of the report given to him.     The patient was admitted to Trauma Surgery under SICU care.  Ortho was consulted for L. iliac wing fx, sternal fx, L. clavicle fx- recommended WBAT of the affected left lower extremity with walker, WBAT L shoulder, pendulum exercises okay, no lifting, sling for comfort, Encourage ROM of elbow/wrist/hand, PT/OT consult, NO urgent orthopedic surgical intervention at this time.    Neurosurgery consulted- recommended repeat CTH, which was stable.     PRS was consulted for non displaced left superior orbital rim and non displaced left nasal bone fracture--> No indication for operative fixation of these non displaced fractures; recommend ice to face to aid in swelling and analgesia.    She was placed on hemorrhage watch in the SICU given pelvic fracture and surrounding hematoma.  Behavior health was consulted for delirium.    Hand was consulted for L. pinky PIP dislocation- s/p closed reduction, rec Cont buddy taping for now.    4/21- CTH stable  - Added home lexapro  - Added NS @ 50cc/hr to supplement poor PO intake  - Lactate cleared (1.9)    4/22- Transfused 1u pRBC, Hct 20.9 -> 26.9  - Went into SVT, given adenosine x1 and started on metoprolol 25mg q12hr  - Added oxycodone 5mg q4hrs prn for increased pain control  - PO intake improved, IVL    4/23- EP was consulted for SVT, recc continue lopressor 25mg   Metoprolol PO increased from 25mg q12hrs to 25mg q6hrs.   - Added salt tabs 1g q8hrs and started on NS @ 50cc/hr for hyponatremia  - Avila discontinued, passed TOV  - Added lovenox  - Cervical collar cleared by confrontational exam     4/24- Patient transferred from SICU to surgical floor in stable condition.  Discharge planning to acute rehab.     Stable from neurologic perspective consistent with mild TBI  Multiple rib fractures - breathing comfortable with multimodal pain control    On day of discharge, the patient was tolerating diet, working with PT well and pain controlled. The patient was medically stable for discharge to acute rehab with instructions for outpatient follow up 4/27/21. (27 Apr 2021 13:27)    on enhanced supervision  seen by behavioral health yesterday  patient denies new complaint    REVIEW OF SYSTEMS  denies chest pain or sob    PAST MEDICAL & SURGICAL HISTORY  Hypothyroidism    Breast cancer    Salivary Gland Disease    C Section    Abnormality, eye    S/P D&amp;C (status post dilation and curettage)    H/O left mastectomy         RECENT LABS/IMAGING  CBC Full  -  ( 30 Apr 2021 05:00 )  WBC Count : 13.22 K/uL  RBC Count : 3.16 M/uL  Hemoglobin : 9.6 g/dL  Hematocrit : 29.4 %  Platelet Count - Automated : 280 K/uL  Mean Cell Volume : 93.0 fl  Mean Cell Hemoglobin : 30.4 pg  Mean Cell Hemoglobin Concentration : 32.7 gm/dL  Auto Neutrophil # : 10.86 K/uL  Auto Lymphocyte # : 1.50 K/uL  Auto Monocyte # : 0.62 K/uL  Auto Eosinophil # : 0.13 K/uL  Auto Basophil # : 0.02 K/uL  Auto Neutrophil % : 82.1 %  Auto Lymphocyte % : 11.3 %  Auto Monocyte % : 4.7 %  Auto Eosinophil % : 1.0 %  Auto Basophil % : 0.2 %         VITALS  T(C): 36.8 (05-01-21 @ 07:57), Max: 37 (04-30-21 @ 21:14)  HR: 64 (05-01-21 @ 07:57) (64 - 80)  BP: 105/64 (05-01-21 @ 07:57) (105/64 - 117/73)  RR: 14 (05-01-21 @ 07:57) (14 - 14)  SpO2: 93% (05-01-21 @ 07:57) (91% - 95%)  Wt(kg): --    ALLERGIES  Tapazole (Hives)      MEDICATIONS   acetaminophen   Tablet .. 650 milliGRAM(s) Oral every 6 hours PRN  bethanechol 5 milliGRAM(s) Oral two times a day  enoxaparin Injectable 40 milliGRAM(s) SubCutaneous every 24 hours  escitalopram 10 milliGRAM(s) Oral daily  folic acid 1 milliGRAM(s) Oral daily  levothyroxine 112 MICROGram(s) Oral daily  lidocaine   Patch 1 Patch Transdermal daily  melatonin 3 milliGRAM(s) Oral at bedtime  metoprolol succinate  milliGRAM(s) Oral daily  multivitamin 1 Tablet(s) Oral daily  nystatin Powder 1 Application(s) Topical two times a day  oxyCODONE    IR 5 milliGRAM(s) Oral every 4 hours PRN  oxyCODONE    IR 10 milliGRAM(s) Oral every 6 hours PRN  polyethylene glycol 3350 17 Gram(s) Oral daily PRN  senna 2 Tablet(s) Oral at bedtime  zinc oxide 40% Ointment 1 Application(s) Topical two times a day      ----------------------------------------------------------------------------------------  PHYSICAL EXAM  Constitutional - NAD, Comfortable  HEENT -  EOMI   Chest - no respiratory distress  Cardiovascular - RRR, S1S2   Abdomen - Soft, NTND  + avila  Extremities - No C/C/E, No calf tenderness   Neurologic Exam -                    Cognitive - Awake, Alert, AAO to self, place, year     Communication - Fluent, No dysarthria     Cranial Nerves - CN 2-12 intact     Motor -                      LEFT    UE - in sling, buddy tape L 5 digit                    RIGHT UE - ShAB 5/5, EF 5/5, EE 5/5, WE 5/5,  5/5                    LEFT    LE - HF 3/5, KE 5/5, DF 5/5, PF 5/5                    RIGHT LE - HF 5/5, KE 5/5, DF 5/5, PF 5/5        Sensory - Intact to LT      OculoVestibular - No saccades, No nystagmus, VOR         Balance - WNL Static  Psychiatric - Mood stable, Affect WNL  ----------------------------------------------------------------------------------------  ASSESSMENT/PLAN  JONATHAN ARTHUR is a 65y with PMHx breast Ca S/P mastectomy, HTN, hypothyroidism presented to [facility] 4/19/21 as level 2 trauma after falling down a flight of stairs. She was found to have bilateral frontoparietal SAH, L 3-8 and R 7th rib fractures with occult L pneumothorax, sternal fracture, L iliac wing fracture with hematoma S/P 1U pRBCs--WBAT Left LE, L forehead laceration S/P suture repair, L pinky PIP dislocation S/P closed reduction with buddy splint, L clavicle fracture-- NWB Left UE, L nasal bone and L orbital rim fractures, and sternal fracture. No surgical intervention.  Admitted to rehab with cognitive deficits, gait and ADL impairment.      continue current treatment plan:  patient seen by behavioral health, appreciate recs- consider increasing melatonin, modafanil (if ok with Dr. Soto)    Comprehensive Multidisciplinary Rehab Program:  - Cont comprehensive rehab program, PT/OT/SLP 3 hours a day, 5 days a week.  - WBAT LLE, NWB LUE but ok to do ROM exercises for L elbow, wrist, hand and L shoulder pendulum exercises  --LSO brace whenever OOB  - Precautions: falls, sternal     #SAH  - S/P Keppra 7 day course for seizure ppx  - Avoid NSAIDs  - F/u neurosurgery, Dr. Vikas Aquino, upon discharge    #Multiple fractures  - L 3-8 and R 7th rib fractures with occult L pneumothorax, sternal fracture, L iliac wing fracture with hematoma S/P 1U pRBCs, L forehead laceration S/P suture repair, L pinky PIP dislocation S/P closed reduction with buddy splint, L clavicle fracture, L nasal bone and L orbital rim fractures, and sternal fracture  - Sling to LUE for comfort. No lifting more than 1-3 pounds for 6 weeks.  - Continue Lovenox for 6 weeks total (4/22-6/3)  - Tylenol, Oxycodone PRN for pain control. Lidoderm to L chest wall.  - F/u plastic surgeon Dr. Nell Choe, for facial laceration, L pinky dislocation, L nasal bone and orbital rim fractures  - F/u ortho, Dr. Reji Cardoza, for L clavicle and L pelvic wing fractures  - F/u surgery, Dr. Dino Peña, for rib fractures  - CT T/L spine shows T5 compression fracture of indeterminate age and acute L3 fracture.  MRI 4/29--Acute to subacute benign compression fracture of L3 and left sacral insufficiency fracture.  No Cord Compression.  --Ortho consult Appreciated-- recommended LSO. Orthotist fit patient with LSO 4/29.  Needs LSO whenever OOB or sitting.  No laying down or sleeping in LSO.   -- Continue current pain regimen.    #Depression  - Per , patient was exhibiting signs of depression even before admission.  - On Lexapro 10mg daily.  - Psych consulted.     #Sleep  - Started on Melatonin 3mg QHS.    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 Morgan Hernández, outpatient in 3 months, may consider elective ablation.    #R kidney lesion  - F/u Dr. Herrera outpatient or PCP    #Hypothyroidism  - Continue Synthroid 112mcg daily    GI/Bowel:  - At risk for constipation due to neurologic diagnosis, immobility and/or medication use  - Senna QHS, Miralax PRN Daily    /Bladder:   - At risk for incontinence and retention due to neurologic diagnosis and limited mobility  - Avila for urinary retention    Skin/Pressure Injury:   - At risk for pressure injury due to neurologic diagnosis and relative immobility.  - Skin assessment on admission: incontinence associated dermatitis at inner thighs, groin and buttocks. Start desitin and nystatin powder to affected areas.   - Monitor Incisions: L facial laceration  - Turn every 2 hours while in bed, air mattress  - Soft heel protectors  - Skin barrier cream as needed  - Nursing to monitor skin Qshift    DVT ppx:  - Lovenox  - SCDs

## 2021-05-02 PROCEDURE — 99232 SBSQ HOSP IP/OBS MODERATE 35: CPT

## 2021-05-02 RX ORDER — LANOLIN ALCOHOL/MO/W.PET/CERES
6 CREAM (GRAM) TOPICAL AT BEDTIME
Refills: 0 | Status: DISCONTINUED | OUTPATIENT
Start: 2021-05-02 | End: 2021-05-05

## 2021-05-02 RX ADMIN — OXYCODONE HYDROCHLORIDE 10 MILLIGRAM(S): 5 TABLET ORAL at 01:30

## 2021-05-02 RX ADMIN — LIDOCAINE 1 PATCH: 4 CREAM TOPICAL at 11:13

## 2021-05-02 RX ADMIN — Medication 112 MICROGRAM(S): at 05:33

## 2021-05-02 RX ADMIN — ZINC OXIDE 1 APPLICATION(S): 200 OINTMENT TOPICAL at 18:25

## 2021-05-02 RX ADMIN — OXYCODONE HYDROCHLORIDE 10 MILLIGRAM(S): 5 TABLET ORAL at 00:34

## 2021-05-02 RX ADMIN — POLYETHYLENE GLYCOL 3350 17 GRAM(S): 17 POWDER, FOR SOLUTION ORAL at 05:39

## 2021-05-02 RX ADMIN — Medication 1 MILLIGRAM(S): at 11:13

## 2021-05-02 RX ADMIN — NYSTATIN CREAM 1 APPLICATION(S): 100000 CREAM TOPICAL at 05:35

## 2021-05-02 RX ADMIN — SENNA PLUS 2 TABLET(S): 8.6 TABLET ORAL at 21:15

## 2021-05-02 RX ADMIN — ENOXAPARIN SODIUM 40 MILLIGRAM(S): 100 INJECTION SUBCUTANEOUS at 05:36

## 2021-05-02 RX ADMIN — Medication 1 TABLET(S): at 11:13

## 2021-05-02 RX ADMIN — Medication 5 MILLIGRAM(S): at 18:11

## 2021-05-02 RX ADMIN — ESCITALOPRAM OXALATE 10 MILLIGRAM(S): 10 TABLET, FILM COATED ORAL at 11:13

## 2021-05-02 RX ADMIN — Medication 5 MILLIGRAM(S): at 05:33

## 2021-05-02 RX ADMIN — Medication 10 MILLIGRAM(S): at 18:48

## 2021-05-02 RX ADMIN — Medication 6 MILLIGRAM(S): at 21:15

## 2021-05-02 RX ADMIN — NYSTATIN CREAM 1 APPLICATION(S): 100000 CREAM TOPICAL at 18:21

## 2021-05-02 RX ADMIN — LIDOCAINE 1 PATCH: 4 CREAM TOPICAL at 21:00

## 2021-05-02 RX ADMIN — ZINC OXIDE 1 APPLICATION(S): 200 OINTMENT TOPICAL at 05:35

## 2021-05-02 RX ADMIN — LIDOCAINE 1 PATCH: 4 CREAM TOPICAL at 02:00

## 2021-05-02 RX ADMIN — LIDOCAINE 1 PATCH: 4 CREAM TOPICAL at 18:22

## 2021-05-02 NOTE — PROGRESS NOTE ADULT - SUBJECTIVE AND OBJECTIVE BOX
Patient is a 65y old  Female who presents with a chief complaint of SAH and fractures of left-sided ribs, left iliac wing with hematoma, S/P closed reduction of left pinky PIP dislocation after trauma (01 May 2021 11:57)      24 HOUR EVENTS:  No overnight events reported.     SUBJECTIVE:  Patient seen and examined at bedside. Difficult to get information from patient. Awake and oriented to person and place. Not to time. She says "okay" to most questions. Denies being in any pain. Reports toileting well.     ALLERGIES:  Tapazole (Hives)    MEDICATIONS  (STANDING):  bethanechol 5 milliGRAM(s) Oral two times a day  enoxaparin Injectable 40 milliGRAM(s) SubCutaneous every 24 hours  escitalopram 10 milliGRAM(s) Oral daily  folic acid 1 milliGRAM(s) Oral daily  levothyroxine 112 MICROGram(s) Oral daily  lidocaine   Patch 1 Patch Transdermal daily  melatonin 3 milliGRAM(s) Oral at bedtime  metoprolol succinate  milliGRAM(s) Oral daily  multivitamin 1 Tablet(s) Oral daily  nystatin Powder 1 Application(s) Topical two times a day  senna 2 Tablet(s) Oral at bedtime  zinc oxide 40% Ointment 1 Application(s) Topical two times a day    MEDICATIONS  (PRN):  acetaminophen   Tablet .. 650 milliGRAM(s) Oral every 6 hours PRN Temp greater or equal to 38C (100.4F), Mild Pain (1 - 3)  oxyCODONE    IR 10 milliGRAM(s) Oral every 6 hours PRN Severe Pain (7 - 10)  oxyCODONE    IR 5 milliGRAM(s) Oral every 4 hours PRN Moderate Pain (4 - 6)  polyethylene glycol 3350 17 Gram(s) Oral daily PRN Constipation    Vital Signs Last 24 Hrs  T(F): 98.8 (02 May 2021 07:51), Max: 98.8 (02 May 2021 07:51)  HR: 67 (02 May 2021 07:51) (65 - 72)  BP: 105/69 (02 May 2021 07:51) (105/67 - 111/68)  RR: 15 (02 May 2021 07:51) (14 - 16)  SpO2: 93% (02 May 2021 07:51) (93% - 94%)  I&O's Summary    01 May 2021 07:01  -  02 May 2021 07:00  --------------------------------------------------------  IN: 0 mL / OUT: 2100 mL / NET: -2100 mL      PHYSICAL EXAM:  General: NAD, appears weak overall, A/O x 2, left eye ecchymosis noted over periorbital area, follows simple commands but slow in speech and movement  ENT: MMM, no scleral icterus  Neck: Supple, No JVD, no thyromegaly  Lungs: Clear to auscultation bilaterally, no wheezes, no rales, no rhonchi, good inspiratory effort  Cardio: RRR, S1/S2, No murmurs  Abdomen: Soft, Nontender, Nondistended; Bowel sounds present  Extremities: No calf tenderness, No pitting edema, no skin changes  Neuro: Moving all extremities spontaneously    LABS:                        9.6    13.22 )-----------( 280      ( 30 Apr 2021 05:00 )             29.4     RADIOLOGY & ADDITIONAL TESTS:    < from: MR Lumbar Spine No Cont (04.29.21 @ 14:05) >  IMPRESSION:  Acute to subacute benign compression fracture of L3 and left sacral insufficiency fracture.    < end of copied text >    < from: CT Lumbar/ Thoracic Spine No Cont (04.28.21 @ 11:23) >    IMPRESSION:  Age-indeterminate mild anterior compression fracture T5 vertebral body.    Fracture at the right superior endplate of L3 vertebral body with mild depression appears acute. Correlate clinically with site of pain/injury.    Multiple acute fractures of left ribs. CT chest recommended for complete evaluation    < end of copied text >    Care Discussed with Consultants/Other Providers:

## 2021-05-02 NOTE — PROGRESS NOTE ADULT - ASSESSMENT
64 yo woman with PMHx breast cancer s/p mastectomy, HTN, Hypothyroidism admitted to [facility] after hospital course at [facility] after a fall from a flight of stairs.    #Impaired gait, mobility, ADL  #Traumatic Fall  #SAH, left-sided rib fractures, left iliac wing fracture with hematoma, left forehead laceration, s/p successful closed reduction of left pinky PIP dislocation.    #Acute L3 compression fracture, 4/29  - Comprehensive rehab program of PT/OT/SLP - 3 hours a day, 5 days a week  - Continue Keppra for 7 days total  - Continue Lexapro  - Monitor H/H  - follow Vit D level  - Fall, Seizure, Bleeding Precautions  - Bowel regimen as per primary team  - Pain meds as per primary team   - PT fitted today for LSO brace; to be worn only when OOB and sitting, not when sleeping    #Leukocytosis, improving  - UA is neg for infection  - likely reactive  - continue to monitor  - if fever spike, send septic work up including BCX x 2, lactic acid, CXR    #Normocytic Anemia with folic acid deficiency  - possible blood loss anemia s/p traumatic Fall  - baseline H/H 13.2/41.2 (4/19)  - monitor H/H  - Transfuse to keep H/H > 7  - C/w folic acid 1mg po daily    #Elevated ALP and TB  - trend LFTs - will add on for tomorrow. ALP could be inc due to fractures. Pt currently denying abdominal pain.   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 Hernández [phone]. (elective ablation)    #Hypothyroidism  - Continue Synthroid 112mcg daily  - TSH 1.2 (4/22)    # Acute adjustment disorder with depressed mood    - dx by Psych. Recs for trial of modafinil (50 mg x3 days then 100 mg) for cognitive stimulation    # Insomnia  - melatonin 6 mg qhs    Resolved issues:    #Acute Hypoxic Respiratory Failure likely due to Acute B/L rib fractures, and small pneumothorax  - Incentive spirometry.   - Supplemental Oxygen as needed for O2 goal > 92%    #DVT PPX - Lovenox    Discussed with Dr. Ceballos, PMR.    64 yo woman with PMHx breast cancer s/p mastectomy, HTN, Hypothyroidism admitted to [facility] after hospital course at [facility] after a fall from a flight of stairs.    #Impaired gait, mobility, ADL  #Traumatic Fall  #SAH, left-sided rib fractures, left iliac wing fracture with hematoma, left forehead laceration, s/p successful closed reduction of left pinky PIP dislocation.    #Acute L3 compression fracture, 4/29  - Comprehensive rehab program of PT/OT/SLP - 3 hours a day, 5 days a week  - Continue Keppra for 7 days total  - Continue Lexapro  - Monitor H/H  -  Vit D level wnl  - Fall, Seizure, Bleeding Precautions  - Bowel regimen as per primary team  - Pain meds as per primary team   - PT fitted for LSO brace; to be worn only when OOB and sitting, not when sleeping    #Leukocytosis, improving  - UA is neg for infection  - likely reactive  - continue to monitor  - if fever spike, send septic work up including BCX x 2, lactic acid, CXR    #Normocytic Anemia with folic acid deficiency  - possible blood loss anemia s/p traumatic Fall  - baseline H/H 13.2/41.2 (4/19)  - monitor H/H  - Transfuse to keep H/H > 7  - C/w folic acid 1mg po daily    #Elevated ALP and TB  - trend LFTs - will add on for tomorrow. ALP could be inc due to fractures. Pt currently denying abdominal pain.   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 Hernández [phone]. (elective ablation)    #Hypothyroidism  - Continue Synthroid 112mcg daily  - TSH 1.2 (4/22)    # Acute adjustment disorder with depressed mood    - dx by Psych. Recs for trial of modafinil (50 mg x3 days then 100 mg) for cognitive stimulation    # Insomnia  - melatonin 6 mg qhs    Resolved issues:    #Acute Hypoxic Respiratory Failure likely due to Acute B/L rib fractures, and small pneumothorax  - Incentive spirometry.   - Supplemental Oxygen as needed for O2 goal > 92%    #DVT PPX - Lovenox    Discussed with Dr. Ceballos, PMR.    No

## 2021-05-02 NOTE — PROGRESS NOTE ADULT - REASON FOR ADMISSION
Please call the patient's mother and let her know that patient's labs are normal. No additional monitoring labs are necessary. Please confirm she would like isotretinoin sent in [facility] pharmacy.   SAH and fractures of left-sided ribs, left iliac wing with hematoma, S/P closed reduction of left pinky PIP dislocation after trauma

## 2021-05-03 LAB
ALBUMIN SERPL ELPH-MCNC: 2.8 G/DL — LOW (ref 3.3–5)
ALP SERPL-CCNC: 329 U/L — HIGH (ref 40–120)
ALT FLD-CCNC: 28 U/L — SIGNIFICANT CHANGE UP (ref 10–45)
ANION GAP SERPL CALC-SCNC: 11 MMOL/L — SIGNIFICANT CHANGE UP (ref 5–17)
AST SERPL-CCNC: 17 U/L — SIGNIFICANT CHANGE UP (ref 10–40)
BASOPHILS # BLD AUTO: 0.02 K/UL — SIGNIFICANT CHANGE UP (ref 0–0.2)
BASOPHILS NFR BLD AUTO: 0.2 % — SIGNIFICANT CHANGE UP (ref 0–2)
BILIRUB SERPL-MCNC: 0.8 MG/DL — SIGNIFICANT CHANGE UP (ref 0.2–1.2)
BUN SERPL-MCNC: 18 MG/DL — SIGNIFICANT CHANGE UP (ref 7–23)
CALCIUM SERPL-MCNC: 8.7 MG/DL — SIGNIFICANT CHANGE UP (ref 8.4–10.5)
CHLORIDE SERPL-SCNC: 106 MMOL/L — SIGNIFICANT CHANGE UP (ref 96–108)
CO2 SERPL-SCNC: 23 MMOL/L — SIGNIFICANT CHANGE UP (ref 22–31)
CREAT SERPL-MCNC: 0.54 MG/DL — SIGNIFICANT CHANGE UP (ref 0.5–1.3)
EOSINOPHIL # BLD AUTO: 0.09 K/UL — SIGNIFICANT CHANGE UP (ref 0–0.5)
EOSINOPHIL NFR BLD AUTO: 0.9 % — SIGNIFICANT CHANGE UP (ref 0–6)
GLUCOSE SERPL-MCNC: 111 MG/DL — HIGH (ref 70–99)
HCT VFR BLD CALC: 30.5 % — LOW (ref 34.5–45)
HGB BLD-MCNC: 9.9 G/DL — LOW (ref 11.5–15.5)
IMM GRANULOCYTES NFR BLD AUTO: 0.6 % — SIGNIFICANT CHANGE UP (ref 0–1.5)
LYMPHOCYTES # BLD AUTO: 1.32 K/UL — SIGNIFICANT CHANGE UP (ref 1–3.3)
LYMPHOCYTES # BLD AUTO: 13.9 % — SIGNIFICANT CHANGE UP (ref 13–44)
MCHC RBC-ENTMCNC: 30.3 PG — SIGNIFICANT CHANGE UP (ref 27–34)
MCHC RBC-ENTMCNC: 32.5 GM/DL — SIGNIFICANT CHANGE UP (ref 32–36)
MCV RBC AUTO: 93.3 FL — SIGNIFICANT CHANGE UP (ref 80–100)
MONOCYTES # BLD AUTO: 0.64 K/UL — SIGNIFICANT CHANGE UP (ref 0–0.9)
MONOCYTES NFR BLD AUTO: 6.7 % — SIGNIFICANT CHANGE UP (ref 2–14)
NEUTROPHILS # BLD AUTO: 7.4 K/UL — SIGNIFICANT CHANGE UP (ref 1.8–7.4)
NEUTROPHILS NFR BLD AUTO: 77.7 % — HIGH (ref 43–77)
NRBC # BLD: 0 /100 WBCS — SIGNIFICANT CHANGE UP (ref 0–0)
PLATELET # BLD AUTO: 337 K/UL — SIGNIFICANT CHANGE UP (ref 150–400)
POTASSIUM SERPL-MCNC: 4.3 MMOL/L — SIGNIFICANT CHANGE UP (ref 3.5–5.3)
POTASSIUM SERPL-SCNC: 4.3 MMOL/L — SIGNIFICANT CHANGE UP (ref 3.5–5.3)
PROT SERPL-MCNC: 6.3 G/DL — SIGNIFICANT CHANGE UP (ref 6–8.3)
RBC # BLD: 3.27 M/UL — LOW (ref 3.8–5.2)
RBC # FLD: 15.3 % — HIGH (ref 10.3–14.5)
SODIUM SERPL-SCNC: 140 MMOL/L — SIGNIFICANT CHANGE UP (ref 135–145)
WBC # BLD: 9.53 K/UL — SIGNIFICANT CHANGE UP (ref 3.8–10.5)
WBC # FLD AUTO: 9.53 K/UL — SIGNIFICANT CHANGE UP (ref 3.8–10.5)

## 2021-05-03 PROCEDURE — 99232 SBSQ HOSP IP/OBS MODERATE 35: CPT

## 2021-05-03 RX ORDER — POLYETHYLENE GLYCOL 3350 17 G/17G
17 POWDER, FOR SOLUTION ORAL DAILY
Refills: 0 | Status: DISCONTINUED | OUTPATIENT
Start: 2021-05-03 | End: 2021-06-02

## 2021-05-03 RX ORDER — OXYCODONE HYDROCHLORIDE 5 MG/1
10 TABLET ORAL EVERY 6 HOURS
Refills: 0 | Status: DISCONTINUED | OUTPATIENT
Start: 2021-05-03 | End: 2021-05-03

## 2021-05-03 RX ORDER — OXYCODONE HYDROCHLORIDE 5 MG/1
10 TABLET ORAL
Refills: 0 | Status: DISCONTINUED | OUTPATIENT
Start: 2021-05-03 | End: 2021-05-10

## 2021-05-03 RX ORDER — MODAFINIL 200 MG/1
50 TABLET ORAL
Refills: 0 | Status: DISCONTINUED | OUTPATIENT
Start: 2021-05-03 | End: 2021-05-05

## 2021-05-03 RX ORDER — OXYCODONE HYDROCHLORIDE 5 MG/1
5 TABLET ORAL EVERY 4 HOURS
Refills: 0 | Status: DISCONTINUED | OUTPATIENT
Start: 2021-05-03 | End: 2021-05-07

## 2021-05-03 RX ADMIN — Medication 100 MILLIGRAM(S): at 05:15

## 2021-05-03 RX ADMIN — Medication 1 MILLIGRAM(S): at 11:13

## 2021-05-03 RX ADMIN — OXYCODONE HYDROCHLORIDE 10 MILLIGRAM(S): 5 TABLET ORAL at 10:30

## 2021-05-03 RX ADMIN — NYSTATIN CREAM 1 APPLICATION(S): 100000 CREAM TOPICAL at 19:03

## 2021-05-03 RX ADMIN — Medication 5 MILLIGRAM(S): at 05:15

## 2021-05-03 RX ADMIN — LIDOCAINE 1 PATCH: 4 CREAM TOPICAL at 11:13

## 2021-05-03 RX ADMIN — LIDOCAINE 1 PATCH: 4 CREAM TOPICAL at 23:33

## 2021-05-03 RX ADMIN — ZINC OXIDE 1 APPLICATION(S): 200 OINTMENT TOPICAL at 19:05

## 2021-05-03 RX ADMIN — Medication 5 MILLIGRAM(S): at 17:47

## 2021-05-03 RX ADMIN — Medication 6 MILLIGRAM(S): at 21:33

## 2021-05-03 RX ADMIN — MODAFINIL 50 MILLIGRAM(S): 200 TABLET ORAL at 08:10

## 2021-05-03 RX ADMIN — NYSTATIN CREAM 1 APPLICATION(S): 100000 CREAM TOPICAL at 05:15

## 2021-05-03 RX ADMIN — ZINC OXIDE 1 APPLICATION(S): 200 OINTMENT TOPICAL at 05:16

## 2021-05-03 RX ADMIN — SENNA PLUS 2 TABLET(S): 8.6 TABLET ORAL at 21:33

## 2021-05-03 RX ADMIN — OXYCODONE HYDROCHLORIDE 10 MILLIGRAM(S): 5 TABLET ORAL at 09:32

## 2021-05-03 RX ADMIN — ESCITALOPRAM OXALATE 10 MILLIGRAM(S): 10 TABLET, FILM COATED ORAL at 11:13

## 2021-05-03 RX ADMIN — Medication 112 MICROGRAM(S): at 05:15

## 2021-05-03 RX ADMIN — ENOXAPARIN SODIUM 40 MILLIGRAM(S): 100 INJECTION SUBCUTANEOUS at 05:15

## 2021-05-03 RX ADMIN — Medication 1 TABLET(S): at 11:13

## 2021-05-03 RX ADMIN — LIDOCAINE 1 PATCH: 4 CREAM TOPICAL at 19:05

## 2021-05-03 NOTE — PROGRESS NOTE ADULT - ASSESSMENT
Assessment/Plan:  JONATHAN CHILD is a 65y with PMHx breast Ca S/P mastectomy, HTN, hypothyroidism presented to [facility] 4/19/21 as level 2 trauma after falling down a flight of stairs. She was found to have bilateral frontoparietal SAH, L 3-8 and R 7th rib fractures with occult L pneumothorax, sternal fracture, L iliac wing fracture with hematoma S/P 1U pRBCs--WBAT Left LE, L forehead laceration S/P suture repair, L pinky PIP dislocation S/P closed reduction with buddy splint, L clavicle fracture-- NWB Left UE, L nasal bone and L orbital rim fractures, and sternal fracture. No surgical intervention.  Admitted to rehab with cognitive deficits, gait and ADL impairment.      Comprehensive Multidisciplinary Rehab Program:  - Cont comprehensive rehab program, PT/OT/SLP 3 hours a day, 5 days a week.  - WBAT LLE, NWB LUE but ok to do ROM exercises for L elbow, wrist, hand and L shoulder pendulum exercises  --LSO brace whenever OOB  - Precautions: falls, sternal     #SAH  - S/P Keppra 7 day course for seizure ppx  - Avoid NSAIDs  - F/u neurosurgery, Dr. Vikas Aquino, upon discharge    #Multiple fractures  - L 3-8 and R 7th rib fractures with occult L pneumothorax, sternal fracture, L iliac wing fracture with hematoma S/P 1U pRBCs, L forehead laceration S/P suture repair, L pinky PIP dislocation S/P closed reduction with buddy splint, L clavicle fracture, L nasal bone and L orbital rim fractures, and sternal fracture  - Sling to LUE for comfort. No lifting more than 1-3 pounds for 6 weeks.  - Continue Lovenox for 6 weeks total (4/22-6/3)  - Tylenol, Oxycodone PRN for pain control. Lidoderm to L chest wall.  - F/u plastic surgeon Dr. Nell Choe, for facial laceration, L pinky dislocation, L nasal bone and orbital rim fractures  - F/u ortho, Dr. Reji Cardoza, for L clavicle and L pelvic wing fractures  - F/u surgery, Dr. Dino Peña, for rib fractures  - CT T/L spine shows T5 compression fracture of indeterminate age and acute L3 fracture.  MRI 4/29--Acute to subacute benign compression fracture of L3 and left sacral insufficiency fracture.  No Cord Compression.  --Ortho consult Appreciated-- recommended LSO. Orthotist fit patient with LSO 4/29.  Needs LSO whenever OOB or sitting.  No laying down or sleeping in LSO.   -- Added scheduled Oxycodone 10mg at 8am before therapy to help improve pain control    #Depression  - Per , patient was exhibiting signs of depression even before admission.  - On Lexapro 10mg daily.  - Psych consult reviewed and appreciated  --Trial Modafinil 50mg x 3 days then increase to 100mg     #Sleep  - Cont. Melatonin 6mg QHS.    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 Morgan Hernández, outpatient in 3 months, may consider elective ablation.    #R kidney lesion  - F/u Dr. Herrera outpatient or PCP    #Hypothyroidism  - Continue Synthroid 112mcg daily    GI/Bowel:  - At risk for constipation due to neurologic diagnosis, immobility and/or medication use  - Senna QHS, Miralax PRN Daily    /Bladder:   - At risk for incontinence and retention due to neurologic diagnosis and limited mobility  - Currently patient has urinary retention with SC Q6hr up to 1150cc.  - Spangler placed 4/29/21    Skin/Pressure Injury:   - At risk for pressure injury due to neurologic diagnosis and relative immobility.  - Skin assessment on admission: incontinence associated dermatitis at inner thighs, groin and buttocks. Start desitin and nystatin powder to affected areas.   - Monitor Incisions: L facial laceration  - Turn every 2 hours while in bed, air mattress  - Soft heel protectors  - Skin barrier cream as needed  - Nursing to monitor skin Qshift    DVT ppx:  - Lovenox  - SCDs    IDT 4/29/21:  - SW: lives with son and spouse, has HHA for iADLs but able to do ADLs independently. Owns cane and RW.  - OT: Mark eating, totalA dressing and toileting, maxA toilet transfers. Goal: supervision grooming, bathing LBD, Barb UBD and toilet transfers, Mark shower transfers  - PT: maxA transfer. No stairs or ambulation attempted. Goals: Mark in 3 weeks.  - SLP: regular/thins. Pending full SLP eval but visual deficits ?L neglect, mild-mod cognitive deficits, decreased attention and memory.  - Barrier: back pain  - EDOD: 5/19 to home  ---------------  Outpatient Follow-up (Specialty/Name of physician):  Silvina Herrera  INTERNAL MEDICINE  [address] [place] [address]  Phone: [phone]  Fax: [phone]  Follow Up Time: 1 week    Vikas Aquino)  Neurosurgery  [address] [place] [address], [address] [place] [address]  Phone: [phone]  Fax: [phone]  Follow Up Time: 2 weeks    Nell Choe)  Plastic Surgery  [address] [facility], [address]  [place], [place] [id number]  Phone: [phone]  Fax: [phone]  Follow Up Time: 1 week    Reji Cardoza)  Orthopaedic Surgery  [address] [facility], [facility] [address]  [place] [address]  Phone: [phone]  Fax: [phone]  Follow Up Time: Routine    Dino Peña)  Surgery; Surgical Critical Care  [address] [place] [address], [facility] [address] [address]  Phone: [phone]  Fax: [phone]  Follow Up Time: Routine    Morgan Hernández; PhD)  Cardiac Electrophysiology; Cardiovascular Disease; Internal Medicine  [facility] - Dept of Cardiology, [address] [place] [address]  Phone: [phone]  Fax: [phone]   Assessment/Plan:  JONATHAN CHILD is a 65y with PMHx breast Ca S/P mastectomy, HTN, hypothyroidism presented to [facility] 4/19/21 as level 2 trauma after falling down a flight of stairs. She was found to have bilateral frontoparietal SAH, L 3-8 and R 7th rib fractures with occult L pneumothorax, sternal fracture, L iliac wing fracture with hematoma S/P 1U pRBCs--WBAT Left LE, L forehead laceration S/P suture repair, L pinky PIP dislocation S/P closed reduction with buddy splint, L clavicle fracture-- NWB Left UE, L nasal bone and L orbital rim fractures, and sternal fracture. No surgical intervention.  Admitted to rehab with cognitive deficits, gait and ADL impairment.      Comprehensive Multidisciplinary Rehab Program:  - Cont comprehensive rehab program, PT/OT/SLP 3 hours a day, 5 days a week.  - WBAT LLE, NWB LUE but ok to do ROM exercises for L elbow, wrist, hand and L shoulder pendulum exercises  --LSO brace whenever OOB  - Precautions: falls, sternal     #SAH  - S/P Keppra 7 day course for seizure ppx  - Avoid NSAIDs  - F/u neurosurgery, Dr. Vikas Aquino, upon discharge    #Multiple fractures  - L 3-8 and R 7th rib fractures with occult L pneumothorax, sternal fracture, L iliac wing fracture with hematoma S/P 1U pRBCs, L forehead laceration S/P suture repair, L pinky PIP dislocation S/P closed reduction with buddy splint, L clavicle fracture, L nasal bone and L orbital rim fractures, and sternal fracture  - Sling to LUE for comfort. No lifting more than 1-3 pounds for 6 weeks.  - Continue Lovenox for 6 weeks total (4/22-6/3)  - Tylenol, Oxycodone PRN for pain control. Lidoderm to L chest wall.  - F/u plastic surgeon Dr. Nell Choe, for facial laceration, L pinky dislocation, L nasal bone and orbital rim fractures  - F/u ortho, Dr. Reji Cardoza, for L clavicle and L pelvic wing fractures  - F/u surgery, Dr. Dino Peña, for rib fractures  - CT T/L spine shows T5 compression fracture of indeterminate age and acute L3 fracture.  MRI 4/29--Acute to subacute benign compression fracture of L3 and left sacral insufficiency fracture.  No Cord Compression.  --Ortho consult Appreciated-- recommended LSO. Orthotist fit patient with LSO 4/29.  Needs LSO whenever OOB or sitting.  No laying down or sleeping in LSO.   -- Added scheduled Oxycodone 10mg at 8am before therapy to help improve pain control    #Depression  - Per , patient was exhibiting signs of depression even before admission.  - On Lexapro 10mg daily.  - Psych consult reviewed and appreciated  --Trial Modafinil 50mg x 3 days then increase to 100mg     #Sleep  - Cont. Melatonin 6mg QHS.    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 Morgan Hernández, outpatient in 3 months, may consider elective ablation.    #R kidney lesion  - F/u Dr. Herrera outpatient or PCP    #Hypothyroidism  - Continue Synthroid 112mcg daily    GI/Bowel:  - At risk for constipation due to neurologic diagnosis, immobility and/or medication use  - Senna QHS, Miralax Daily  --suppository given yesterday    /Bladder:   - At risk for incontinence and retention due to neurologic diagnosis and limited mobility  - Currently patient has urinary retention with SC Q6hr up to 1150cc.  - Spangler placed 4/29/21    Skin/Pressure Injury:   - At risk for pressure injury due to neurologic diagnosis and relative immobility.  - Skin assessment on admission: incontinence associated dermatitis at inner thighs, groin and buttocks. Start desitin and nystatin powder to affected areas.   - Monitor Incisions: L facial laceration  - Turn every 2 hours while in bed, air mattress  - Soft heel protectors  - Skin barrier cream as needed  - Nursing to monitor skin Qshift    DVT ppx:  - Lovenox  - SCDs    IDT 4/29/21:  - SW: lives with son and spouse, has HHA for iADLs but able to do ADLs independently. Owns cane and RW.  - OT: Mark eating, totalA dressing and toileting, maxA toilet transfers. Goal: supervision grooming, bathing LBD, Barb UBD and toilet transfers, Mark shower transfers  - PT: maxA transfer. No stairs or ambulation attempted. Goals: Mark in 3 weeks.  - SLP: regular/thins. Pending full SLP eval but visual deficits ?L neglect, mild-mod cognitive deficits, decreased attention and memory.  - Barrier: back pain  - EDOD: 5/19 to home  ---------------  Outpatient Follow-up (Specialty/Name of physician):  Silvina Herrera  INTERNAL MEDICINE  [address] [place] [address]  Phone: [phone]  Fax: [phone]  Follow Up Time: 1 week    Vikas Aquino)  Neurosurgery  [address] [facility], [address] [place] [address]  Phone: [phone]  Fax: [phone]  Follow Up Time: 2 weeks    Nell Choe)  Plastic Surgery  [address] [facility] [address]  [place], [place] [id number]  Phone: [phone]  Fax: [phone]  Follow Up Time: 1 week    Reji Cardoza)  Orthopaedic Surgery  [address] [facility] [facility] [address]  [place] [address]  Phone: [phone]  Fax: [phone]  Follow Up Time: Routine    Dino Peña)  Surgery; Surgical Critical Care  [address] [facility], [address], [place] [address]  Phone: [phone]  Fax: [phone]  Follow Up Time: Routine    Morgan Hernández; PhD)  Cardiac Electrophysiology; Cardiovascular Disease; Internal Medicine  [facility] - Dept of Cardiology, [address] [place] [address]  Phone: [phone]  Fax: [phone]

## 2021-05-03 NOTE — PROGRESS NOTE ADULT - SUBJECTIVE AND OBJECTIVE BOX
HPI:  65F PMHx breast cancer s/p mastectomy, HTN, hypothyroidism, not on full anticoagulation/antiplatelets per EMS, presented to [facility] 4/19/21as a level 2 trauma activation after a fall from a flight of stairs. Patient only able to say "ow" in trauma bay, unable to provide further history. However per son, normal mentation at baseline. In the trauma bay, airway was intact with bilateral breath sounds, O2 saturation 100% on room air. Initially blood pressure was 140s systolic however on repeat 90s. NS@100 initiated. Secondary significant for left forehead laceration, left chest wall tenderness, left pelvic tenderness.     Ms. Arthur was hypotensive in the trauma bay and was transferred to the Surgical ICU after imaging was obtained. Her injury list is below.  (1) SAH, bilateral: stable on repeat imaging. Seven day course of Keppra for post-traumatic seizure prophylaxis.  (2) left 3-8 and right 7th rib fractures with occult left pneumothorax: pain control  (3) left iliac wing fracture with hematoma: required transfusion early in her ICU course, after which CBC were stable  (4) left forehead laceration s/p suture repair  (5) s/p successful closed reduction of left pinky PIP dislocation with delfin splint  (6) left clavicle fracture  (7) left nasal bone and left orbital rim fractures  (8) sternal fracture  Incidental findings: right renal lesion. Pt's  was notified and copy of the report given to him.     The patient was admitted to Trauma Surgery under SICU care.  Ortho was consulted for L. iliac wing fx, sternal fx, L. clavicle fx- recommended WBAT of the affected left lower extremity with walker, WBAT L shoulder, pendulum exercises okay, no lifting, sling for comfort, Encourage ROM of elbow/wrist/hand, PT/OT consult, NO urgent orthopedic surgical intervention at this time.    Neurosurgery consulted- recommended repeat CTH, which was stable.     PRS was consulted for non displaced left superior orbital rim and non displaced left nasal bone fracture--> No indication for operative fixation of these non displaced fractures; recommend ice to face to aid in swelling and analgesia.    She was placed on hemorrhage watch in the SICU given pelvic fracture and surrounding hematoma.  Behavior health was consulted for delirium.    Hand was consulted for L. pinky PIP dislocation- s/p closed reduction, rec Cont delfin taping for now.    4/21- CTH stable  - Added home lexapro  - Added NS @ 50cc/hr to supplement poor PO intake  - Lactate cleared (1.9)    4/22- Transfused 1u pRBC, Hct 20.9 -> 26.9  - Went into SVT, given adenosine x1 and started on metoprolol 25mg q12hr  - Added oxycodone 5mg q4hrs prn for increased pain control  - PO intake improved, IVL    4/23- EP was consulted for SVT, recc continue lopressor 25mg   Metoprolol PO increased from 25mg q12hrs to 25mg q6hrs.   - Added salt tabs 1g q8hrs and started on NS @ 50cc/hr for hyponatremia  - Spangler discontinued, passed TOV  - Added lovenox  - Cervical collar cleared by confrontational exam     4/24- Patient transferred from SICU to surgical floor in stable condition.  Discharge planning to acute rehab.     Stable from neurologic perspective consistent with mild TBI  Multiple rib fractures - breathing comfortable with multimodal pain control    On day of discharge, the patient was tolerating diet, working with PT well and pain controlled. The patient was medically stable for discharge to acute rehab with instructions for outpatient follow up 4/27/21. 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  Pt. has pain in therapy with movement-- 10/10, mainly with standing in back.  +constipation      VITALS  Vital Signs Last 24 Hrs  T(C): 36.7 (03 May 2021 07:38), Max: 37.1 (02 May 2021 20:06)  T(F): 98 (03 May 2021 07:38), Max: 98.8 (02 May 2021 20:06)  HR: 60 (03 May 2021 07:38) (60 - 82)  BP: 105/65 (03 May 2021 07:38) (102/62 - 114/64)  BP(mean): --  RR: 14 (03 May 2021 07:38) (14 - 14)  SpO2: 94% (03 May 2021 07:38) (94% - 94%)    REVIEW OF SYMPTOMS  Neurological deficits--cognitive  Pain from multiple fractures    Physical Exam:  Gen - NAD, Comfortable  HEENT - periorbital ecchymoses, EOMI, MMM  Pulm - CTAB, No wheeze  Cardiovascular - RRR, S1S2, No m/r/g  Abdomen - Soft, NT/ND, +BS  Extremities - No C/C/E, No calf tenderness  Neuro-     Cognitive - AAOx2 and Knew  year.      Communication - Fluent, delayed. Able to repeat.      Attention: Impaired. Requires frequent repetition and cueing.      Memory: Recall 3/3 objects immediate and 0/3 5 min later         Cranial Nerves - CN 2-12 intact     Motor -                    LEFT    UE - Limited due to left clavicle fracture. Able to wiggle fingers and extend wrist and bend elbow about 3-4/5.                     RIGHT UE - ShAB 5/5, EF 5/5, EE 5/5, WE 5/5,  5/5                    LEFT    LE - HF 3/5--limited due to pain, KE 5/5, DF 5/5, PF 5/5                    RIGHT LE - HF 5/5, KE 5/5, DF 5/5, PF 5/5        Sensory - Intact to LT     Reflexes - DTR Intact, No primitive reflex     Coordination - FTN intact     Tone - normal  Psychiatric - Mood stable, Affect WNL    RECENT LABS                        9.9    9.53  )-----------( 337      ( 03 May 2021 05:00 )             30.5     05-03    140  |  106  |  18  ----------------------------<  111<H>  4.3   |  23  |  0.54    Ca    8.7      03 May 2021 05:00    TPro  6.3  /  Alb  2.8<L>  /  TBili  0.8  /  DBili  0.2  /  AST  17  /  ALT  28  /  AlkPhos  329<H>  05-03            RADIOLOGY/OTHER RESULTS      MEDICATIONS  (STANDING):  bethanechol 5 milliGRAM(s) Oral two times a day  enoxaparin Injectable 40 milliGRAM(s) SubCutaneous every 24 hours  escitalopram 10 milliGRAM(s) Oral daily  folic acid 1 milliGRAM(s) Oral daily  levothyroxine 112 MICROGram(s) Oral daily  lidocaine   Patch 1 Patch Transdermal daily  melatonin 6 milliGRAM(s) Oral at bedtime  metoprolol succinate  milliGRAM(s) Oral daily  modafinil 50 milliGRAM(s) Oral <User Schedule>  multivitamin 1 Tablet(s) Oral daily  nystatin Powder 1 Application(s) Topical two times a day  oxyCODONE    IR 10 milliGRAM(s) Oral <User Schedule>  polyethylene glycol 3350 17 Gram(s) Oral daily  senna 2 Tablet(s) Oral at bedtime  zinc oxide 40% Ointment 1 Application(s) Topical two times a day    MEDICATIONS  (PRN):  acetaminophen   Tablet .. 650 milliGRAM(s) Oral every 6 hours PRN Temp greater or equal to 38C (100.4F), Mild Pain (1 - 3)  oxyCODONE    IR 5 milliGRAM(s) Oral every 4 hours PRN Moderate Pain (4 - 6)  oxyCODONE    IR 10 milliGRAM(s) Oral every 6 hours PRN Severe Pain (7 - 10)         HPI:  65F PMHx breast cancer s/p mastectomy, HTN, hypothyroidism, not on full anticoagulation/antiplatelets per EMS, presented to [facility] 4/19/21as a level 2 trauma activation after a fall from a flight of stairs. Patient only able to say "ow" in trauma bay, unable to provide further history. However per son, normal mentation at baseline. In the trauma bay, airway was intact with bilateral breath sounds, O2 saturation 100% on room air. Initially blood pressure was 140s systolic however on repeat 90s. NS@100 initiated. Secondary significant for left forehead laceration, left chest wall tenderness, left pelvic tenderness.     Ms. Arthur was hypotensive in the trauma bay and was transferred to the Surgical ICU after imaging was obtained. Her injury list is below.  (1) SAH, bilateral: stable on repeat imaging. Seven day course of Keppra for post-traumatic seizure prophylaxis.  (2) left 3-8 and right 7th rib fractures with occult left pneumothorax: pain control  (3) left iliac wing fracture with hematoma: required transfusion early in her ICU course, after which CBC were stable  (4) left forehead laceration s/p suture repair  (5) s/p successful closed reduction of left pinky PIP dislocation with delfin splint  (6) left clavicle fracture  (7) left nasal bone and left orbital rim fractures  (8) sternal fracture  Incidental findings: right renal lesion. Pt's  was notified and copy of the report given to him.     The patient was admitted to Trauma Surgery under SICU care.  Ortho was consulted for L. iliac wing fx, sternal fx, L. clavicle fx- recommended WBAT of the affected left lower extremity with walker, WBAT L shoulder, pendulum exercises okay, no lifting, sling for comfort, Encourage ROM of elbow/wrist/hand, PT/OT consult, NO urgent orthopedic surgical intervention at this time.    Neurosurgery consulted- recommended repeat CTH, which was stable.     PRS was consulted for non displaced left superior orbital rim and non displaced left nasal bone fracture--> No indication for operative fixation of these non displaced fractures; recommend ice to face to aid in swelling and analgesia.    She was placed on hemorrhage watch in the SICU given pelvic fracture and surrounding hematoma.  Behavior health was consulted for delirium.    Hand was consulted for L. pinky PIP dislocation- s/p closed reduction, rec Cont delfin taping for now.    4/21- CTH stable  - Added home lexapro  - Added NS @ 50cc/hr to supplement poor PO intake  - Lactate cleared (1.9)    4/22- Transfused 1u pRBC, Hct 20.9 -> 26.9  - Went into SVT, given adenosine x1 and started on metoprolol 25mg q12hr  - Added oxycodone 5mg q4hrs prn for increased pain control  - PO intake improved, IVL    4/23- EP was consulted for SVT, recc continue lopressor 25mg   Metoprolol PO increased from 25mg q12hrs to 25mg q6hrs.   - Added salt tabs 1g q8hrs and started on NS @ 50cc/hr for hyponatremia  - Spangler discontinued, passed TOV  - Added lovenox  - Cervical collar cleared by confrontational exam     4/24- Patient transferred from SICU to surgical floor in stable condition.  Discharge planning to acute rehab.     Stable from neurologic perspective consistent with mild TBI  Multiple rib fractures - breathing comfortable with multimodal pain control    On day of discharge, the patient was tolerating diet, working with PT well and pain controlled. The patient was medically stable for discharge to acute rehab with instructions for outpatient follow up 4/27/21. 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  Pt. has pain in therapy with movement-- 10/10, mainly with standing in back.  +constipation      VITALS  Vital Signs Last 24 Hrs  T(C): 36.7 (03 May 2021 07:38), Max: 37.1 (02 May 2021 20:06)  T(F): 98 (03 May 2021 07:38), Max: 98.8 (02 May 2021 20:06)  HR: 60 (03 May 2021 07:38) (60 - 82)  BP: 105/65 (03 May 2021 07:38) (102/62 - 114/64)  BP(mean): --  RR: 14 (03 May 2021 07:38) (14 - 14)  SpO2: 94% (03 May 2021 07:38) (94% - 94%)    REVIEW OF SYMPTOMS  Neurological deficits--cognitive  Pain from multiple fractures    Physical Exam:  Gen - NAD, Comfortable  HEENT - periorbital ecchymoses, EOMI, MMM  Pulm - CTAB, No wheeze  Cardiovascular - RRR, S1S2, No m/r/g  Abdomen - Soft, NT/ND, +BS  Extremities - No C/C/E, No calf tenderness  Neuro-     Cognitive - AAOx2 and Knew "May" and Knew "desire [place]" and  year with cues.      Communication - Fluent, delayed. Able to repeat.      Attention: Impaired. Requires frequent repetition and cueing.      Memory: Recall 3/3 objects immediate and 0/3 5 min later         Cranial Nerves - CN 2-12 intact     Motor -                    LEFT    UE - Limited due to left clavicle fracture. Able to wiggle fingers and extend wrist and bend elbow about 3-4/5.                     RIGHT UE - ShAB 5/5, EF 5/5, EE 5/5, WE 5/5,  5/5                    LEFT    LE - HF 3/5--limited due to pain, KE 5/5, DF 5/5, PF 5/5                    RIGHT LE - HF 5/5, KE 5/5, DF 5/5, PF 5/5        Sensory - Intact to LT     Reflexes - DTR Intact, No primitive reflex     Coordination - FTN intact     Tone - normal  Psychiatric - Mood stable, Affect WNL    RECENT LABS                        9.9    9.53  )-----------( 337      ( 03 May 2021 05:00 )             30.5     05-03    140  |  106  |  18  ----------------------------<  111<H>  4.3   |  23  |  0.54    Ca    8.7      03 May 2021 05:00    TPro  6.3  /  Alb  2.8<L>  /  TBili  0.8  /  DBili  0.2  /  AST  17  /  ALT  28  /  AlkPhos  329<H>  05-03            RADIOLOGY/OTHER RESULTS      MEDICATIONS  (STANDING):  bethanechol 5 milliGRAM(s) Oral two times a day  enoxaparin Injectable 40 milliGRAM(s) SubCutaneous every 24 hours  escitalopram 10 milliGRAM(s) Oral daily  folic acid 1 milliGRAM(s) Oral daily  levothyroxine 112 MICROGram(s) Oral daily  lidocaine   Patch 1 Patch Transdermal daily  melatonin 6 milliGRAM(s) Oral at bedtime  metoprolol succinate  milliGRAM(s) Oral daily  modafinil 50 milliGRAM(s) Oral <User Schedule>  multivitamin 1 Tablet(s) Oral daily  nystatin Powder 1 Application(s) Topical two times a day  oxyCODONE    IR 10 milliGRAM(s) Oral <User Schedule>  polyethylene glycol 3350 17 Gram(s) Oral daily  senna 2 Tablet(s) Oral at bedtime  zinc oxide 40% Ointment 1 Application(s) Topical two times a day    MEDICATIONS  (PRN):  acetaminophen   Tablet .. 650 milliGRAM(s) Oral every 6 hours PRN Temp greater or equal to 38C (100.4F), Mild Pain (1 - 3)  oxyCODONE    IR 5 milliGRAM(s) Oral every 4 hours PRN Moderate Pain (4 - 6)  oxyCODONE    IR 10 milliGRAM(s) Oral every 6 hours PRN Severe Pain (7 - 10)

## 2021-05-04 LAB — SARS-COV-2 RNA SPEC QL NAA+PROBE: SIGNIFICANT CHANGE UP

## 2021-05-04 PROCEDURE — 99232 SBSQ HOSP IP/OBS MODERATE 35: CPT

## 2021-05-04 PROCEDURE — 99233 SBSQ HOSP IP/OBS HIGH 50: CPT

## 2021-05-04 RX ADMIN — Medication 1 TABLET(S): at 12:16

## 2021-05-04 RX ADMIN — OXYCODONE HYDROCHLORIDE 10 MILLIGRAM(S): 5 TABLET ORAL at 07:26

## 2021-05-04 RX ADMIN — Medication 1 MILLIGRAM(S): at 12:16

## 2021-05-04 RX ADMIN — OXYCODONE HYDROCHLORIDE 5 MILLIGRAM(S): 5 TABLET ORAL at 18:40

## 2021-05-04 RX ADMIN — ZINC OXIDE 1 APPLICATION(S): 200 OINTMENT TOPICAL at 17:38

## 2021-05-04 RX ADMIN — LIDOCAINE 1 PATCH: 4 CREAM TOPICAL at 19:05

## 2021-05-04 RX ADMIN — LIDOCAINE 1 PATCH: 4 CREAM TOPICAL at 13:46

## 2021-05-04 RX ADMIN — NYSTATIN CREAM 1 APPLICATION(S): 100000 CREAM TOPICAL at 05:45

## 2021-05-04 RX ADMIN — ESCITALOPRAM OXALATE 10 MILLIGRAM(S): 10 TABLET, FILM COATED ORAL at 12:16

## 2021-05-04 RX ADMIN — POLYETHYLENE GLYCOL 3350 17 GRAM(S): 17 POWDER, FOR SOLUTION ORAL at 12:16

## 2021-05-04 RX ADMIN — SENNA PLUS 2 TABLET(S): 8.6 TABLET ORAL at 21:12

## 2021-05-04 RX ADMIN — ENOXAPARIN SODIUM 40 MILLIGRAM(S): 100 INJECTION SUBCUTANEOUS at 05:57

## 2021-05-04 RX ADMIN — ZINC OXIDE 1 APPLICATION(S): 200 OINTMENT TOPICAL at 05:45

## 2021-05-04 RX ADMIN — MODAFINIL 50 MILLIGRAM(S): 200 TABLET ORAL at 05:57

## 2021-05-04 RX ADMIN — Medication 5 MILLIGRAM(S): at 05:57

## 2021-05-04 RX ADMIN — Medication 5 MILLIGRAM(S): at 17:38

## 2021-05-04 RX ADMIN — Medication 6 MILLIGRAM(S): at 21:12

## 2021-05-04 RX ADMIN — OXYCODONE HYDROCHLORIDE 5 MILLIGRAM(S): 5 TABLET ORAL at 17:41

## 2021-05-04 RX ADMIN — Medication 112 MICROGRAM(S): at 05:57

## 2021-05-04 RX ADMIN — OXYCODONE HYDROCHLORIDE 10 MILLIGRAM(S): 5 TABLET ORAL at 08:20

## 2021-05-04 RX ADMIN — NYSTATIN CREAM 1 APPLICATION(S): 100000 CREAM TOPICAL at 17:38

## 2021-05-04 NOTE — PROGRESS NOTE ADULT - ASSESSMENT
Assessment/Plan:  JONATHAN CHILD is a 65y with PMHx breast Ca S/P mastectomy, HTN, hypothyroidism presented to [facility] 4/19/21 as level 2 trauma after falling down a flight of stairs. She was found to have bilateral frontoparietal SAH, L 3-8 and R 7th rib fractures with occult L pneumothorax, sternal fracture, L iliac wing fracture with hematoma S/P 1U pRBCs--WBAT Left LE, L forehead laceration S/P suture repair, L pinky PIP dislocation S/P closed reduction with buddy splint, L clavicle fracture-- NWB Left UE, L nasal bone and L orbital rim fractures, and sternal fracture. No surgical intervention.  Admitted to rehab with cognitive deficits, gait and ADL impairment.      Comprehensive Multidisciplinary Rehab Program:  - Cont comprehensive rehab program, PT/OT/SLP 3 hours a day, 5 days a week.  - WBAT LLE, NWB LUE but ok to do ROM exercises for L elbow, wrist, hand and L shoulder pendulum exercises  --LSO brace whenever OOB  - Precautions: falls, sternal     #SAH  - S/P Keppra 7 day course for seizure ppx  - Avoid NSAIDs  - F/u neurosurgery, Dr. Vikas Aquino, upon discharge    #Multiple fractures  - L 3-8 and R 7th rib fractures with occult L pneumothorax, sternal fracture, L iliac wing fracture with hematoma S/P 1U pRBCs, L forehead laceration S/P suture repair, L pinky PIP dislocation S/P closed reduction with buddy splint, L clavicle fracture, L nasal bone and L orbital rim fractures, and sternal fracture  - Sling to LUE for comfort. No lifting more than 1-3 pounds for 6 weeks.  - Continue Lovenox for 6 weeks total (4/22-6/3)  - Tylenol, Oxycodone PRN for pain control. Lidoderm to L chest wall.  - F/u plastic surgeon Dr. Nell Choe, for facial laceration, L pinky dislocation, L nasal bone and orbital rim fractures  - F/u ortho, Dr. Reji Cardoza, for L clavicle and L pelvic wing fractures  - F/u surgery, Dr. Dino Peña, for rib fractures  - CT T/L spine shows T5 compression fracture of indeterminate age and acute L3 fracture.  MRI 4/29--Acute to subacute benign compression fracture of L3 and left sacral insufficiency fracture.  No Cord Compression.  --Ortho consult Appreciated-- recommended LSO. Orthotist fit patient with LSO 4/29.  Needs LSO whenever OOB or sitting.  No laying down or sleeping in LSO.   -- Added scheduled Oxycodone 10mg at 8am before therapy to help improve pain control    #Depression  - Per , patient was exhibiting signs of depression even before admission.  - On Lexapro 10mg daily.  - Psych consult reviewed and appreciated  --Trial Modafinil 50mg x 3 days then increase to 100mg     #Sleep  - Cont. Melatonin 6mg QHS.    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 Morgan Hernández, outpatient in 3 months, may consider elective ablation.    #R kidney lesion  - F/u Dr. Herrera outpatient or PCP    #Hypothyroidism  - Continue Synthroid 112mcg daily    GI/Bowel:  - At risk for constipation due to neurologic diagnosis, immobility and/or medication use  - Senna QHS, Miralax Daily  --suppository given yesterday    /Bladder:   - At risk for incontinence and retention due to neurologic diagnosis and limited mobility  - Currently patient has urinary retention with SC Q6hr up to 1150cc.  - Spangler placed 4/29/21    Skin/Pressure Injury:   - At risk for pressure injury due to neurologic diagnosis and relative immobility.  - Skin assessment on admission: incontinence associated dermatitis at inner thighs, groin and buttocks. Start desitin and nystatin powder to affected areas.   - Monitor Incisions: L facial laceration  - Turn every 2 hours while in bed, air mattress  - Soft heel protectors  - Skin barrier cream as needed  - Nursing to monitor skin Qshift    DVT ppx:  - Lovenox  - SCDs    IDT 4/29/21:  - SW: lives with son and spouse, has HHA for iADLs but able to do ADLs independently. Owns cane and RW.  - OT: Mark eating, totalA dressing and toileting, maxA toilet transfers. Goal: supervision grooming, bathing LBD, Barb UBD and toilet transfers, Mark shower transfers  - PT: maxA transfer. No stairs or ambulation attempted. Goals: Mark in 3 weeks.  - SLP: regular/thins. Pending full SLP eval but visual deficits ?L neglect, mild-mod cognitive deficits, decreased attention and memory.  - Barrier: back pain  - EDOD: 5/19 to home  ---------------  Outpatient Follow-up (Specialty/Name of physician):  Silvina Herrera  INTERNAL MEDICINE  [address] [place] [address]  Phone: [phone]  Fax: [phone]  Follow Up Time: 1 week    Vkias Aquino)  Neurosurgery  [address] [facility], [address] [place] [address]  Phone: [phone]  Fax: [phone]  Follow Up Time: 2 weeks    Nell Choe)  Plastic Surgery  [address] [facility] [address]  [place], [place] [id number]  Phone: [phone]  Fax: [phone]  Follow Up Time: 1 week    Reji Cardoza)  Orthopaedic Surgery  [address] [facility] [facility] [address]  [place] [address]  Phone: [phone]  Fax: [phone]  Follow Up Time: Routine    Dino Peña)  Surgery; Surgical Critical Care  [address] [facility], [address], [place] [address]  Phone: [phone]  Fax: [phone]  Follow Up Time: Routine    Morgan Hernández; PhD)  Cardiac Electrophysiology; Cardiovascular Disease; Internal Medicine  [facility] - Dept of Cardiology, [address] [place] [address]  Phone: [phone]  Fax: [phone]

## 2021-05-04 NOTE — PROGRESS NOTE ADULT - TIME BILLING
** Spoke with pt's   Dr. Arthur at bedside__, to provide update on pt's status,  medical update, medications, progress in therapy, rehab plan of care and planned d/c date of 5/19 and discharge needs/expectations.  Pt's  and son work so  will d/w SW regarding options for aide.  All questions answered.

## 2021-05-04 NOTE — PROGRESS NOTE ADULT - SUBJECTIVE AND OBJECTIVE BOX
HPI:  65F PMHx breast cancer s/p mastectomy, HTN, hypothyroidism, not on full anticoagulation/antiplatelets per EMS, presented to [facility] 4/19/21as a level 2 trauma activation after a fall from a flight of stairs. Patient only able to say "ow" in trauma bay, unable to provide further history. However per son, normal mentation at baseline. In the trauma bay, airway was intact with bilateral breath sounds, O2 saturation 100% on room air. Initially blood pressure was 140s systolic however on repeat 90s. NS@100 initiated. Secondary significant for left forehead laceration, left chest wall tenderness, left pelvic tenderness.     Ms. Arthur was hypotensive in the trauma bay and was transferred to the Surgical ICU after imaging was obtained. Her injury list is below.  (1) SAH, bilateral: stable on repeat imaging. Seven day course of Keppra for post-traumatic seizure prophylaxis.  (2) left 3-8 and right 7th rib fractures with occult left pneumothorax: pain control  (3) left iliac wing fracture with hematoma: required transfusion early in her ICU course, after which CBC were stable  (4) left forehead laceration s/p suture repair  (5) s/p successful closed reduction of left pinky PIP dislocation with delfin splint  (6) left clavicle fracture  (7) left nasal bone and left orbital rim fractures  (8) sternal fracture  Incidental findings: right renal lesion. Pt's  was notified and copy of the report given to him.     The patient was admitted to Trauma Surgery under SICU care.  Ortho was consulted for L. iliac wing fx, sternal fx, L. clavicle fx- recommended WBAT of the affected left lower extremity with walker, WBAT L shoulder, pendulum exercises okay, no lifting, sling for comfort, Encourage ROM of elbow/wrist/hand, PT/OT consult, NO urgent orthopedic surgical intervention at this time.    Neurosurgery consulted- recommended repeat CTH, which was stable.     PRS was consulted for non displaced left superior orbital rim and non displaced left nasal bone fracture--> No indication for operative fixation of these non displaced fractures; recommend ice to face to aid in swelling and analgesia.    She was placed on hemorrhage watch in the SICU given pelvic fracture and surrounding hematoma.  Behavior health was consulted for delirium.    Hand was consulted for L. pinky PIP dislocation- s/p closed reduction, rec Cont delfin taping for now.    4/21- CTH stable  - Added home lexapro  - Added NS @ 50cc/hr to supplement poor PO intake  - Lactate cleared (1.9)    4/22- Transfused 1u pRBC, Hct 20.9 -> 26.9  - Went into SVT, given adenosine x1 and started on metoprolol 25mg q12hr  - Added oxycodone 5mg q4hrs prn for increased pain control  - PO intake improved, IVL    4/23- EP was consulted for SVT, recc continue lopressor 25mg   Metoprolol PO increased from 25mg q12hrs to 25mg q6hrs.   - Added salt tabs 1g q8hrs and started on NS @ 50cc/hr for hyponatremia  - Spangler discontinued, passed TOV  - Added lovenox  - Cervical collar cleared by confrontational exam     4/24- Patient transferred from SICU to surgical floor in stable condition.  Discharge planning to acute rehab.     Stable from neurologic perspective consistent with mild TBI  Multiple rib fractures - breathing comfortable with multimodal pain control    On day of discharge, the patient was tolerating diet, working with PT well and pain controlled. The patient was medically stable for discharge to acute rehab with instructions for outpatient follow up 4/27/21. 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  +BM.  Confused.  Slept during the night    VITALS  Vital Signs Last 24 Hrs  T(C): 36.9 (04 May 2021 07:38), Max: 36.9 (04 May 2021 07:38)  T(F): 98.4 (04 May 2021 07:38), Max: 98.4 (04 May 2021 07:38)  HR: 70 (04 May 2021 07:38) (53 - 70)  BP: 110/72 (04 May 2021 07:38) (107/70 - 111/69)  BP(mean): --  RR: 14 (04 May 2021 07:38) (14 - 14)  SpO2: 94% (04 May 2021 07:38) (94% - 94%)    REVIEW OF SYMPTOMS  Neurological deficits--cognitive  Pain from multiple fractures    Physical Exam:  Gen - NAD, Comfortable  HEENT - periorbital ecchymoses, EOMI, MMM  Pulm - CTAB, No wheeze  Cardiovascular - RRR, S1S2, No m/r/g  Abdomen - Soft, NT/ND, +BS  Extremities - No C/C/E, No calf tenderness  Neuro-     Cognitive - AAOx2 and Knew "May" and Knew "desire [place]" and  year with cues.      Communication - Fluent, delayed. Able to repeat.      Attention: Impaired. Requires frequent repetition and cueing.      Memory: Recall 3/3 objects immediate and 0/3 5 min later         Cranial Nerves - CN 2-12 intact     Motor -                    LEFT    UE - Limited due to left clavicle fracture. Able to wiggle fingers and extend wrist and bend elbow about 3-4/5.                     RIGHT UE - ShAB 5/5, EF 5/5, EE 5/5, WE 5/5,  5/5                    LEFT    LE - HF 3/5--limited due to pain, KE 5/5, DF 5/5, PF 5/5                    RIGHT LE - HF 5/5, KE 5/5, DF 5/5, PF 5/5        Sensory - Intact to LT     Reflexes - DTR Intact, No primitive reflex     Coordination - FTN intact     Tone - normal  Psychiatric - Mood stable, Affect WNL    RECENT LABS                        9.9    9.53  )-----------( 337      ( 03 May 2021 05:00 )             30.5     05-03    140  |  106  |  18  ----------------------------<  111<H>  4.3   |  23  |  0.54    Ca    8.7      03 May 2021 05:00    TPro  6.3  /  Alb  2.8<L>  /  TBili  0.8  /  DBili  0.2  /  AST  17  /  ALT  28  /  AlkPhos  329<H>  05-03            RADIOLOGY/OTHER RESULTS      MEDICATIONS  (STANDING):  bethanechol 5 milliGRAM(s) Oral two times a day  enoxaparin Injectable 40 milliGRAM(s) SubCutaneous every 24 hours  escitalopram 10 milliGRAM(s) Oral daily  folic acid 1 milliGRAM(s) Oral daily  levothyroxine 112 MICROGram(s) Oral daily  lidocaine   Patch 1 Patch Transdermal daily  melatonin 6 milliGRAM(s) Oral at bedtime  metoprolol succinate  milliGRAM(s) Oral daily  modafinil 50 milliGRAM(s) Oral <User Schedule>  multivitamin 1 Tablet(s) Oral daily  nystatin Powder 1 Application(s) Topical two times a day  oxyCODONE    IR 10 milliGRAM(s) Oral <User Schedule>  polyethylene glycol 3350 17 Gram(s) Oral daily  senna 2 Tablet(s) Oral at bedtime  zinc oxide 40% Ointment 1 Application(s) Topical two times a day    MEDICATIONS  (PRN):  acetaminophen   Tablet .. 650 milliGRAM(s) Oral every 6 hours PRN Temp greater or equal to 38C (100.4F), Mild Pain (1 - 3)  oxyCODONE    IR 5 milliGRAM(s) Oral every 4 hours PRN Moderate Pain (4 - 6)  oxyCODONE    IR 10 milliGRAM(s) Oral every 6 hours PRN Severe Pain (7 - 10)

## 2021-05-04 NOTE — PROGRESS NOTE ADULT - ASSESSMENT
64 yo woman with PMHx breast cancer s/p mastectomy, HTN, Hypothyroidism admitted to [facility] after hospital course at [facility] after a fall from a flight of stairs, sustaining SAH, left-sided rib fractures, left iliac wing fracture with hematoma, left forehead laceration, s/p successful closed reduction of left pinky PIP dislocation, Acute L3 compression fracture, 4/29, admitted for rehab- pt/ot/dvt ppx pain meds    #Leukocytosis, improving    #Normocytic Anemia with folic acid deficiency  - likley blood loss anemia s/p traumatic Fall  - monitor H/H  - C/w folic acid 1mg po daily    #Elevated ALKPHOS- UNCLEAR ETIOLOGY  - avoid hepatotoxins  - trend LFTs   - can get abdominal sono if no improvement  - encourage hydration    #Episodes of SVT   - Continue with toprol XL   - Can follow up in 3 months with EP Dr. Hernández [phone]. (elective ablation)    #Hypothyroidism  - Continue Synthroid     # Acute adjustment disorder with depressed mood    -  trial of modafinil for cognitive stimulation    # Insomnia  - melatonin     #Acute Hypoxic Respiratory Failure likely due to Acute B/L rib fractures, and small pneumothorax  - Incentive spirometry.   - Supplemental Oxygen as needed for O2 goal > 92%    #DVT PPX - Lovenox    d/w dr. light

## 2021-05-04 NOTE — CHART NOTE - NSCHARTNOTEFT_GEN_A_CORE
NUTRITION FOLLOW UP    SOURCE: Patient [)   Family [ ]     other [X ]    DIET: Regular kosher ensure enlive bid    PATIENT REPORT[ ] nausea  [ ] vomiting [ ] diarrhea [ ] constipation  [ ]chewing problems [ ] swallowing issues  [ ] other: no GI distress    PO INTAKE:  < 50% [ ]   50-75%  [ ]   %  [X]  other :    SOURCE: for PO intake [] Patient [ ] family [ ] chart [ X] staff [ ] other    ENTERAL/PARENTERAL NUTRITION: n/a    CURRENT WEIGHT: Weight (kg): 62 (04-27 @ 18:31)    4/30 61.3 kg.     PERTINENT LABS:  Date: 03 May 2021 05:00  Hemoglobin 9.9    Hematocrit 30.5     Date: 05-03  Sodium 140  Potassium 4.3  Glucose Serum 111<H>  BUN 18      Creatinine    ACCUCHECK      SKIN: ecchymotic, last bm was 5/3, no edema noted    ESTIMATED NEEDS:   [X] no change since previous assessment  [ ] recalculated:     PREVIOUS NUTRITION DIAGNOSIS: increased nutrient needs    NUTRITION DIAGNOSIS is   [X] resolved, RD will follow as per nutrition department protocol.    NEW NUTRITION DIAGNOSIS: [X] not applicable    MONITORING AND EVALUATION:   Current diet order is appropriate and is well tolerated, but will monitor for any changes that may be needed    [X] PO intake [X] Tolerance to diet prescription [X] weights [X] follow up per protocol    NUTRITION RECOMMENDATIONS: maintain adequate nutrition/hydration    RD remains available RHEA Paulino RD CDE

## 2021-05-04 NOTE — PROGRESS NOTE ADULT - SUBJECTIVE AND OBJECTIVE BOX
65y old  Female who presents with a chief complaint of SAH and fractures of left-sided ribs, left iliac wing with hematoma, S/P closed reduction of left pinky PIP dislocation after trauma     seen at the bedside, no new complaints, no n/v, no sob  No overnight events reported.   She only says  "okay" '' yes or no''     Vital Signs Last 24 Hrs  T(C): 36.9 (04 May 2021 07:38), Max: 36.9 (04 May 2021 07:38)  T(F): 98.4 (04 May 2021 07:38), Max: 98.4 (04 May 2021 07:38)  HR: 70 (04 May 2021 07:38) (53 - 70)  BP: 110/72 (04 May 2021 07:38) (107/70 - 111/69)  BP(mean): --  RR: 14 (04 May 2021 07:38) (14 - 14)  SpO2: 94% (04 May 2021 07:38) (94% - 94%)    GENERAL- NAD  EAR/NOSE/MOUTH/THROAT - no pharyngeal exudates, no oral leisions,  MMM  EYES- BRIT, conjunctiva and Sclera clear  NECK- supple  RESPIRATORY-  clear to auscultation bilaterally, non laboured breathing  CARDIOVASCULAR - SIS2, RRR  GI - soft NT BS present  EXTREMITIES- no pedal edema  NEUROLOGY-left sided weakness  PSYCHIATRY- AAO X 0  MUSCULOSKELETAL- gait imbalance  LEFT UE ROM-  Limited due to left clavicle fracture, sling+                9.9                  140  | 23   | 18           9.53  >-----------< 337     ------------------------< 111                   30.5                 4.3  | 106  | 0.54                                         Ca 8.7   Mg x     Ph x          MEDICATIONS  (STANDING):  bethanechol 5 milliGRAM(s) Oral two times a day  enoxaparin Injectable 40 milliGRAM(s) SubCutaneous every 24 hours  escitalopram 10 milliGRAM(s) Oral daily  folic acid 1 milliGRAM(s) Oral daily  levothyroxine 112 MICROGram(s) Oral daily  lidocaine   Patch 1 Patch Transdermal daily  melatonin 6 milliGRAM(s) Oral at bedtime  metoprolol succinate  milliGRAM(s) Oral daily  modafinil 50 milliGRAM(s) Oral <User Schedule>  multivitamin 1 Tablet(s) Oral daily  nystatin Powder 1 Application(s) Topical two times a day  oxyCODONE    IR 10 milliGRAM(s) Oral <User Schedule>  polyethylene glycol 3350 17 Gram(s) Oral daily  senna 2 Tablet(s) Oral at bedtime  zinc oxide 40% Ointment 1 Application(s) Topical two times a day    MEDICATIONS  (PRN):  acetaminophen   Tablet .. 650 milliGRAM(s) Oral every 6 hours PRN Temp greater or equal to 38C (100.4F), Mild Pain (1 - 3)  oxyCODONE    IR 5 milliGRAM(s) Oral every 4 hours PRN Moderate Pain (4 - 6)  oxyCODONE    IR 10 milliGRAM(s) Oral every 6 hours PRN Severe Pain (7 - 10)

## 2021-05-05 PROCEDURE — 99232 SBSQ HOSP IP/OBS MODERATE 35: CPT

## 2021-05-05 RX ORDER — MODAFINIL 200 MG/1
100 TABLET ORAL
Refills: 0 | Status: DISCONTINUED | OUTPATIENT
Start: 2021-05-06 | End: 2021-05-13

## 2021-05-05 RX ORDER — LANOLIN ALCOHOL/MO/W.PET/CERES
9 CREAM (GRAM) TOPICAL AT BEDTIME
Refills: 0 | Status: DISCONTINUED | OUTPATIENT
Start: 2021-05-05 | End: 2021-05-06

## 2021-05-05 RX ORDER — BETHANECHOL CHLORIDE 25 MG
10 TABLET ORAL
Refills: 0 | Status: DISCONTINUED | OUTPATIENT
Start: 2021-05-05 | End: 2021-05-27

## 2021-05-05 RX ADMIN — Medication 9 MILLIGRAM(S): at 21:09

## 2021-05-05 RX ADMIN — NYSTATIN CREAM 1 APPLICATION(S): 100000 CREAM TOPICAL at 05:15

## 2021-05-05 RX ADMIN — LIDOCAINE 1 PATCH: 4 CREAM TOPICAL at 01:03

## 2021-05-05 RX ADMIN — Medication 5 MILLIGRAM(S): at 05:16

## 2021-05-05 RX ADMIN — LIDOCAINE 1 PATCH: 4 CREAM TOPICAL at 12:10

## 2021-05-05 RX ADMIN — Medication 10 MILLIGRAM(S): at 17:35

## 2021-05-05 RX ADMIN — LIDOCAINE 1 PATCH: 4 CREAM TOPICAL at 18:00

## 2021-05-05 RX ADMIN — SENNA PLUS 2 TABLET(S): 8.6 TABLET ORAL at 21:09

## 2021-05-05 RX ADMIN — POLYETHYLENE GLYCOL 3350 17 GRAM(S): 17 POWDER, FOR SOLUTION ORAL at 12:14

## 2021-05-05 RX ADMIN — ENOXAPARIN SODIUM 40 MILLIGRAM(S): 100 INJECTION SUBCUTANEOUS at 05:16

## 2021-05-05 RX ADMIN — Medication 112 MICROGRAM(S): at 05:16

## 2021-05-05 RX ADMIN — ESCITALOPRAM OXALATE 10 MILLIGRAM(S): 10 TABLET, FILM COATED ORAL at 12:09

## 2021-05-05 RX ADMIN — MODAFINIL 50 MILLIGRAM(S): 200 TABLET ORAL at 05:22

## 2021-05-05 RX ADMIN — OXYCODONE HYDROCHLORIDE 10 MILLIGRAM(S): 5 TABLET ORAL at 08:30

## 2021-05-05 RX ADMIN — Medication 100 MILLIGRAM(S): at 05:16

## 2021-05-05 RX ADMIN — ZINC OXIDE 1 APPLICATION(S): 200 OINTMENT TOPICAL at 17:36

## 2021-05-05 RX ADMIN — Medication 1 MILLIGRAM(S): at 12:09

## 2021-05-05 RX ADMIN — OXYCODONE HYDROCHLORIDE 10 MILLIGRAM(S): 5 TABLET ORAL at 07:36

## 2021-05-05 RX ADMIN — Medication 1 TABLET(S): at 12:10

## 2021-05-05 RX ADMIN — ZINC OXIDE 1 APPLICATION(S): 200 OINTMENT TOPICAL at 05:15

## 2021-05-05 NOTE — PROGRESS NOTE ADULT - ASSESSMENT
Assessment/Plan:  JONATHAN CHILD is a 65y with PMHx breast Ca S/P mastectomy, HTN, hypothyroidism presented to [facility] 4/19/21 as level 2 trauma after falling down a flight of stairs. She was found to have bilateral frontoparietal SAH, L 3-8 and R 7th rib fractures with occult L pneumothorax, sternal fracture, L iliac wing fracture with hematoma S/P 1U pRBCs--WBAT Left LE, L forehead laceration S/P suture repair, L pinky PIP dislocation S/P closed reduction with buddy splint, L clavicle fracture-- NWB Left UE, L nasal bone and L orbital rim fractures, and sternal fracture. No surgical intervention.  Admitted to rehab with cognitive deficits, gait and ADL impairment.      Comprehensive Multidisciplinary Rehab Program:  - Cont comprehensive rehab program, PT/OT/SLP 3 hours a day, 5 days a week.  - WBAT LLE, NWB LUE but ok to do ROM exercises for L elbow, wrist, hand and L shoulder pendulum exercises  --LSO brace whenever OOB  - Precautions: falls, sternal     #SAH  - S/P Keppra 7 day course for seizure ppx  - Avoid NSAIDs  - F/u neurosurgery, Dr. Vikas Aquino, upon discharge    #Multiple fractures  - L 3-8 and R 7th rib fractures with occult L pneumothorax, sternal fracture, L iliac wing fracture with hematoma S/P 1U pRBCs, L forehead laceration S/P suture repair, L pinky PIP dislocation S/P closed reduction with buddy splint, L clavicle fracture, L nasal bone and L orbital rim fractures, and sternal fracture  - Sling to LUE for comfort. No lifting more than 1-3 pounds for 6 weeks.  - Continue Lovenox for 6 weeks total (4/22-6/3)  - Tylenol, Oxycodone PRN for pain control. Lidoderm to L chest wall.  - F/u plastic surgeon Dr. Nell Choe, for facial laceration, L pinky dislocation, L nasal bone and orbital rim fractures  - F/u ortho, Dr. Reji Cardoza, for L clavicle and L pelvic wing fractures  - F/u surgery, Dr. Dino Peña, for rib fractures  - CT T/L spine shows T5 compression fracture of indeterminate age and acute L3 fracture.  MRI 4/29--Acute to subacute benign compression fracture of L3 and left sacral insufficiency fracture.  No Cord Compression.  --Ortho consult Appreciated-- recommended LSO. Orthotist fit patient with LSO 4/29.  Needs LSO whenever OOB or sitting.  No laying down or sleeping in LSO.   -- Cont.  scheduled Oxycodone 10mg at 8am before therapy to help improve pain control    #Depression  - Per , patient was exhibiting signs of depression even before admission.  - On Lexapro 10mg daily.  - Psych consult reviewed and appreciated  --Trial Modafinil 50mg x 3 days then increase to 100mg--will increase to 100mg tomorrow     #Sleep  - increase Melatonin to 9mg QHS.    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 Morgan Hernández, outpatient in 3 months, may consider elective ablation.    #R kidney lesion  - F/u Dr. Herrera outpatient or PCP    #Hypothyroidism  - Continue Synthroid 112mcg daily    GI/Bowel:  - At risk for constipation due to neurologic diagnosis, immobility and/or medication use  - Senna QHS, Miralax Daily    /Bladder:   - At risk for incontinence and retention due to neurologic diagnosis and limited mobility  - Avila placed 4/29/21 for urinary retention  --Increased bethanachol to 10mg BID  -- Plan for avila removal for TOV tomorrow night      Skin/Pressure Injury:   - At risk for pressure injury due to neurologic diagnosis and relative immobility.  - Skin assessment on admission: incontinence associated dermatitis at inner thighs, groin and buttocks. Cont desitin and nystatin powder to affected areas.   - Monitor Incisions: L facial laceration  - Turn every 2 hours while in bed, air mattress  - Soft heel protectors  - Skin barrier cream as needed  - Nursing to monitor skin Qshift    DVT ppx:  - Lovenox  - SCDs    IDT 4/29/21:  - SW: lives with son and spouse, has HHA for iADLs but able to do ADLs independently. Owns cane and RW.  - OT: Mark eating, totalA dressing and toileting, maxA toilet transfers. Goal: supervision grooming, bathing LBD, Barb UBD and toilet transfers, Mark shower transfers  - PT: maxA transfer. No stairs or ambulation attempted. Goals: Mark in 3 weeks.  - SLP: regular/thins. Pending full SLP eval but visual deficits ?L neglect, mild-mod cognitive deficits, decreased attention and memory.  - Barrier: back pain  - EDOD: 5/19 to home  ---------------  Outpatient Follow-up (Specialty/Name of physician):  Silvina Herrera  INTERNAL MEDICINE  [address] [place] [address]  Phone: [phone]  Fax: [phone]  Follow Up Time: 1 week    Vikas Aquino (MD)  Neurosurgery  [address] [facility], [address] [place] [address]  Phone: [phone]  Fax: [phone]  Follow Up Time: 2 weeks    Nell Choe)  Plastic Surgery  [address] [facility], [address]  [place], [place] [id number]  Phone: [phone]  Fax: [phone]  Follow Up Time: 1 week    Reji Cardoza)  Orthopaedic Surgery  [address] [facility], [address]  [place] [address]  Phone: [phone]  Fax: [phone]  Follow Up Time: Routine    Dino Peña)  Surgery; Surgical Critical Care  [address] [facility], [address]  [place] [address]  Phone: [phone]  Fax: [phone]  Follow Up Time: Routine    Morgan Hernández; PhD)  Cardiac Electrophysiology; Cardiovascular Disease; Internal Medicine  [facility] - Dept of Cardiology, [address] [place] [address]  Phone: [phone]  Fax: [phone]

## 2021-05-05 NOTE — PROGRESS NOTE ADULT - SUBJECTIVE AND OBJECTIVE BOX
HPI:  65F PMHx breast cancer s/p mastectomy, HTN, hypothyroidism, not on full anticoagulation/antiplatelets per EMS, presented to [facility] 4/19/21as a level 2 trauma activation after a fall from a flight of stairs. Patient only able to say "ow" in trauma bay, unable to provide further history. However per son, normal mentation at baseline. In the trauma bay, airway was intact with bilateral breath sounds, O2 saturation 100% on room air. Initially blood pressure was 140s systolic however on repeat 90s. NS@100 initiated. Secondary significant for left forehead laceration, left chest wall tenderness, left pelvic tenderness.     Ms. Arthur was hypotensive in the trauma bay and was transferred to the Surgical ICU after imaging was obtained. Her injury list is below.  (1) SAH, bilateral: stable on repeat imaging. Seven day course of Keppra for post-traumatic seizure prophylaxis.  (2) left 3-8 and right 7th rib fractures with occult left pneumothorax: pain control  (3) left iliac wing fracture with hematoma: required transfusion early in her ICU course, after which CBC were stable  (4) left forehead laceration s/p suture repair  (5) s/p successful closed reduction of left pinky PIP dislocation with delfin splint  (6) left clavicle fracture  (7) left nasal bone and left orbital rim fractures  (8) sternal fracture  Incidental findings: right renal lesion. Pt's  was notified and copy of the report given to him.     The patient was admitted to Trauma Surgery under SICU care.  Ortho was consulted for L. iliac wing fx, sternal fx, L. clavicle fx- recommended WBAT of the affected left lower extremity with walker, WBAT L shoulder, pendulum exercises okay, no lifting, sling for comfort, Encourage ROM of elbow/wrist/hand, PT/OT consult, NO urgent orthopedic surgical intervention at this time.    Neurosurgery consulted- recommended repeat CTH, which was stable.     PRS was consulted for non displaced left superior orbital rim and non displaced left nasal bone fracture--> No indication for operative fixation of these non displaced fractures; recommend ice to face to aid in swelling and analgesia.    She was placed on hemorrhage watch in the SICU given pelvic fracture and surrounding hematoma.  Behavior health was consulted for delirium.    Hand was consulted for L. pinky PIP dislocation- s/p closed reduction, rec Cont delfin taping for now.    4/21- CTH stable  - Added home lexapro  - Added NS @ 50cc/hr to supplement poor PO intake  - Lactate cleared (1.9)    4/22- Transfused 1u pRBC, Hct 20.9 -> 26.9  - Went into SVT, given adenosine x1 and started on metoprolol 25mg q12hr  - Added oxycodone 5mg q4hrs prn for increased pain control  - PO intake improved, IVL    4/23- EP was consulted for SVT, recc continue lopressor 25mg   Metoprolol PO increased from 25mg q12hrs to 25mg q6hrs.   - Added salt tabs 1g q8hrs and started on NS @ 50cc/hr for hyponatremia  - Avila discontinued, passed TOV  - Added lovenox  - Cervical collar cleared by confrontational exam     4/24- Patient transferred from SICU to surgical floor in stable condition.  Discharge planning to acute rehab.     Stable from neurologic perspective consistent with mild TBI  Multiple rib fractures - breathing comfortable with multimodal pain control    On day of discharge, the patient was tolerating diet, working with PT well and pain controlled. The patient was medically stable for discharge to acute rehab with instructions for outpatient follow up 4/27/21. 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 reports some difficulty sleeping at night.  Slept 11pm to 5 am with one awakening as per nursing sleep log.  Pain controlled.  Participating in therapy.  +BM yesterday      VITALS  Vital Signs Last 24 Hrs  T(C): 36.8 (05 May 2021 07:30), Max: 36.9 (04 May 2021 19:56)  T(F): 98.2 (05 May 2021 07:30), Max: 98.5 (04 May 2021 19:56)  HR: 64 (05 May 2021 07:33) (64 - 81)  BP: 116/75 (05 May 2021 07:33) (108/72 - 123/73)  BP(mean): --  RR: 14 (05 May 2021 07:30) (14 - 15)  SpO2: 96% (05 May 2021 07:30) (94% - 96%)    REVIEW OF SYMPTOMS  Neurological deficits--cognitive  Pain from multiple fractures    Physical Exam:  Gen - NAD, Comfortable  HEENT - periorbital ecchymoses, EOMI, MMM  Pulm - CTAB, No wheeze  Cardiovascular - RRR, S1S2, No m/r/g  Abdomen - Soft, NT/ND, +BS  Extremities - No C/C/E, No calf tenderness  Neuro-     Cognitive - AAOx2 and Knew "May" and Knew "desire Harris" and  year with cues.      Communication - Fluent, delayed. Able to repeat.      Attention: Impaired. Requires frequent repetition and cueing.      Memory: Recall 3/3 objects immediate and 0/3 5 min later         Cranial Nerves - CN 2-12 intact     Motor -                    LEFT    UE - Limited due to left clavicle fracture. Able to wiggle fingers and extend wrist and bend elbow about 3-4/5.                     RIGHT UE - ShAB 5/5, EF 5/5, EE 5/5, WE 5/5,  5/5                    LEFT    LE - HF 3/5--limited due to pain, KE 5/5, DF 5/5, PF 5/5                    RIGHT LE - HF 5/5, KE 5/5, DF 5/5, PF 5/5        Sensory - Intact to LT     Reflexes - DTR Intact, No primitive reflex     Coordination - FTN intact     Tone - normal  Psychiatric - Mood stable, Affect WNL    : avila     RECENT LABS                  RADIOLOGY/OTHER RESULTS      MEDICATIONS  (STANDING):  bethanechol 10 milliGRAM(s) Oral two times a day  enoxaparin Injectable 40 milliGRAM(s) SubCutaneous every 24 hours  escitalopram 10 milliGRAM(s) Oral daily  folic acid 1 milliGRAM(s) Oral daily  levothyroxine 112 MICROGram(s) Oral daily  lidocaine   Patch 1 Patch Transdermal daily  melatonin 9 milliGRAM(s) Oral at bedtime  metoprolol succinate  milliGRAM(s) Oral daily  multivitamin 1 Tablet(s) Oral daily  nystatin Powder 1 Application(s) Topical two times a day  oxyCODONE    IR 10 milliGRAM(s) Oral <User Schedule>  polyethylene glycol 3350 17 Gram(s) Oral daily  senna 2 Tablet(s) Oral at bedtime  zinc oxide 40% Ointment 1 Application(s) Topical two times a day    MEDICATIONS  (PRN):  acetaminophen   Tablet .. 650 milliGRAM(s) Oral every 6 hours PRN Temp greater or equal to 38C (100.4F), Mild Pain (1 - 3)  oxyCODONE    IR 5 milliGRAM(s) Oral every 4 hours PRN Moderate Pain (4 - 6)  oxyCODONE    IR 10 milliGRAM(s) Oral every 6 hours PRN Severe Pain (7 - 10)

## 2021-05-06 LAB
ALBUMIN SERPL ELPH-MCNC: 3.1 G/DL — LOW (ref 3.3–5)
ALP SERPL-CCNC: 486 U/L — HIGH (ref 40–120)
ALT FLD-CCNC: 24 U/L — SIGNIFICANT CHANGE UP (ref 10–45)
ANION GAP SERPL CALC-SCNC: 14 MMOL/L — SIGNIFICANT CHANGE UP (ref 5–17)
AST SERPL-CCNC: 16 U/L — SIGNIFICANT CHANGE UP (ref 10–40)
BASOPHILS # BLD AUTO: 0.03 K/UL — SIGNIFICANT CHANGE UP (ref 0–0.2)
BASOPHILS NFR BLD AUTO: 0.2 % — SIGNIFICANT CHANGE UP (ref 0–2)
BILIRUB SERPL-MCNC: 0.7 MG/DL — SIGNIFICANT CHANGE UP (ref 0.2–1.2)
BUN SERPL-MCNC: 20 MG/DL — SIGNIFICANT CHANGE UP (ref 7–23)
CALCIUM SERPL-MCNC: 8.9 MG/DL — SIGNIFICANT CHANGE UP (ref 8.4–10.5)
CHLORIDE SERPL-SCNC: 105 MMOL/L — SIGNIFICANT CHANGE UP (ref 96–108)
CO2 SERPL-SCNC: 23 MMOL/L — SIGNIFICANT CHANGE UP (ref 22–31)
CREAT SERPL-MCNC: 0.69 MG/DL — SIGNIFICANT CHANGE UP (ref 0.5–1.3)
EOSINOPHIL # BLD AUTO: 0.04 K/UL — SIGNIFICANT CHANGE UP (ref 0–0.5)
EOSINOPHIL NFR BLD AUTO: 0.3 % — SIGNIFICANT CHANGE UP (ref 0–6)
GGT SERPL-CCNC: 38 U/L — SIGNIFICANT CHANGE UP (ref 8–40)
GLUCOSE SERPL-MCNC: 149 MG/DL — HIGH (ref 70–99)
HCT VFR BLD CALC: 33.8 % — LOW (ref 34.5–45)
HGB BLD-MCNC: 11.1 G/DL — LOW (ref 11.5–15.5)
IMM GRANULOCYTES NFR BLD AUTO: 0.6 % — SIGNIFICANT CHANGE UP (ref 0–1.5)
LYMPHOCYTES # BLD AUTO: 1.18 K/UL — SIGNIFICANT CHANGE UP (ref 1–3.3)
LYMPHOCYTES # BLD AUTO: 9.7 % — LOW (ref 13–44)
MCHC RBC-ENTMCNC: 30.2 PG — SIGNIFICANT CHANGE UP (ref 27–34)
MCHC RBC-ENTMCNC: 32.8 GM/DL — SIGNIFICANT CHANGE UP (ref 32–36)
MCV RBC AUTO: 91.8 FL — SIGNIFICANT CHANGE UP (ref 80–100)
MONOCYTES # BLD AUTO: 0.72 K/UL — SIGNIFICANT CHANGE UP (ref 0–0.9)
MONOCYTES NFR BLD AUTO: 5.9 % — SIGNIFICANT CHANGE UP (ref 2–14)
NEUTROPHILS # BLD AUTO: 10.1 K/UL — HIGH (ref 1.8–7.4)
NEUTROPHILS NFR BLD AUTO: 83.3 % — HIGH (ref 43–77)
NRBC # BLD: 0 /100 WBCS — SIGNIFICANT CHANGE UP (ref 0–0)
PLATELET # BLD AUTO: 369 K/UL — SIGNIFICANT CHANGE UP (ref 150–400)
POTASSIUM SERPL-MCNC: 4.3 MMOL/L — SIGNIFICANT CHANGE UP (ref 3.5–5.3)
POTASSIUM SERPL-SCNC: 4.3 MMOL/L — SIGNIFICANT CHANGE UP (ref 3.5–5.3)
PROT SERPL-MCNC: 6.8 G/DL — SIGNIFICANT CHANGE UP (ref 6–8.3)
RBC # BLD: 3.68 M/UL — LOW (ref 3.8–5.2)
RBC # FLD: 15.9 % — HIGH (ref 10.3–14.5)
SODIUM SERPL-SCNC: 142 MMOL/L — SIGNIFICANT CHANGE UP (ref 135–145)
WBC # BLD: 12.14 K/UL — HIGH (ref 3.8–10.5)
WBC # FLD AUTO: 12.14 K/UL — HIGH (ref 3.8–10.5)

## 2021-05-06 PROCEDURE — 99232 SBSQ HOSP IP/OBS MODERATE 35: CPT

## 2021-05-06 RX ORDER — RAMELTEON 8 MG
8 TABLET ORAL AT BEDTIME
Refills: 0 | Status: DISCONTINUED | OUTPATIENT
Start: 2021-05-06 | End: 2021-05-21

## 2021-05-06 RX ADMIN — ZINC OXIDE 1 APPLICATION(S): 200 OINTMENT TOPICAL at 05:22

## 2021-05-06 RX ADMIN — Medication 10 MILLIGRAM(S): at 05:22

## 2021-05-06 RX ADMIN — LIDOCAINE 1 PATCH: 4 CREAM TOPICAL at 12:10

## 2021-05-06 RX ADMIN — POLYETHYLENE GLYCOL 3350 17 GRAM(S): 17 POWDER, FOR SOLUTION ORAL at 12:10

## 2021-05-06 RX ADMIN — SENNA PLUS 2 TABLET(S): 8.6 TABLET ORAL at 21:08

## 2021-05-06 RX ADMIN — LIDOCAINE 1 PATCH: 4 CREAM TOPICAL at 00:30

## 2021-05-06 RX ADMIN — Medication 112 MICROGRAM(S): at 05:22

## 2021-05-06 RX ADMIN — ESCITALOPRAM OXALATE 10 MILLIGRAM(S): 10 TABLET, FILM COATED ORAL at 12:10

## 2021-05-06 RX ADMIN — Medication 1 TABLET(S): at 12:10

## 2021-05-06 RX ADMIN — OXYCODONE HYDROCHLORIDE 10 MILLIGRAM(S): 5 TABLET ORAL at 07:53

## 2021-05-06 RX ADMIN — Medication 8 MILLIGRAM(S): at 21:07

## 2021-05-06 RX ADMIN — Medication 10 MILLIGRAM(S): at 17:55

## 2021-05-06 RX ADMIN — ZINC OXIDE 1 APPLICATION(S): 200 OINTMENT TOPICAL at 17:55

## 2021-05-06 RX ADMIN — LIDOCAINE 1 PATCH: 4 CREAM TOPICAL at 19:36

## 2021-05-06 RX ADMIN — ENOXAPARIN SODIUM 40 MILLIGRAM(S): 100 INJECTION SUBCUTANEOUS at 05:21

## 2021-05-06 RX ADMIN — NYSTATIN CREAM 1 APPLICATION(S): 100000 CREAM TOPICAL at 17:56

## 2021-05-06 RX ADMIN — OXYCODONE HYDROCHLORIDE 5 MILLIGRAM(S): 5 TABLET ORAL at 14:18

## 2021-05-06 RX ADMIN — Medication 1 MILLIGRAM(S): at 12:11

## 2021-05-06 RX ADMIN — OXYCODONE HYDROCHLORIDE 5 MILLIGRAM(S): 5 TABLET ORAL at 15:18

## 2021-05-06 RX ADMIN — MODAFINIL 100 MILLIGRAM(S): 200 TABLET ORAL at 06:32

## 2021-05-06 RX ADMIN — NYSTATIN CREAM 1 APPLICATION(S): 100000 CREAM TOPICAL at 05:22

## 2021-05-06 NOTE — PROGRESS NOTE ADULT - ASSESSMENT
64 yo woman with PMHx breast cancer s/p mastectomy, HTN, Hypothyroidism admitted to [facility] after hospital course at [facility] after a fall from a flight of stairs, sustaining SAH, left-sided rib fractures, left iliac wing fracture with hematoma, left forehead laceration, s/p successful closed reduction of left pinky PIP dislocation, Acute L3 compression fracture, 4/29, admitted for rehab- pt/ot/dvt ppx pain meds    #Leukocytosis  - continue to monitor    #Normocytic Anemia with folic acid deficiency - improving  - likley blood loss anemia s/p traumatic Fall  - monitor H/H  - C/w folic acid 1mg po daily    #Elevated ALKPHOS- UNCLEAR ETIOLOGY  - avoid hepatotoxins  - trend LFTs   - RUQ sono  - encourage hydration    #Episodes of SVT   - Continue with toprol XL   - Can follow up in 3 months with EP Dr. Hernández [phone]. (elective ablation)    #Hypothyroidism  - Continue Synthroid     # Acute adjustment disorder with depressed mood    -  trial of modafinil for cognitive stimulation    # Insomnia  - melatonin     #Acute Hypoxic Respiratory Failure likely due to Acute B/L rib fractures, and small pneumothorax  - Incentive spirometry.   - Supplemental Oxygen as needed for O2 goal > 92%    #DVT PPX - Lovenox 66 yo woman with PMHx breast cancer s/p mastectomy, HTN, Hypothyroidism admitted to [facility] after hospital course at [facility] after a fall from a flight of stairs, sustaining SAH, left-sided rib fractures, left iliac wing fracture with hematoma, left forehead laceration, s/p successful closed reduction of left pinky PIP dislocation, Acute L3 compression fracture, 4/29, admitted for rehab- pt/ot/dvt ppx pain meds    #Leukocytosis  - continue to monitor    #Normocytic Anemia with folic acid deficiency - improving  - likley blood loss anemia s/p traumatic Fall  - monitor H/H  - C/w folic acid 1mg po daily    #Elevated ALKPHOS- UNCLEAR ETIOLOGY  - avoid hepatotoxins  - f/u GGT  - trend LFTs   - RUQ sono  - encourage hydration    #Episodes of SVT   - Continue with toprol XL   - Can follow up in 3 months with EP Dr. Hernández [phone]. (elective ablation)    #Hypothyroidism  - Continue Synthroid     # Acute adjustment disorder with depressed mood    -  trial of modafinil for cognitive stimulation    # Insomnia  - melatonin     #Acute Hypoxic Respiratory Failure likely due to Acute B/L rib fractures, and small pneumothorax  - Incentive spirometry.   - Supplemental Oxygen as needed for O2 goal > 92%    #DVT PPX - Lovenox

## 2021-05-06 NOTE — PROGRESS NOTE ADULT - ASSESSMENT
Assessment/Plan:  JONATHAN CHILD is a 65y with PMHx breast Ca S/P mastectomy, HTN, hypothyroidism presented to [facility] 4/19/21 as level 2 trauma after falling down a flight of stairs. She was found to have bilateral frontoparietal SAH, L 3-8 and R 7th rib fractures with occult L pneumothorax, sternal fracture, L iliac wing fracture with hematoma S/P 1U pRBCs--WBAT Left LE, L forehead laceration S/P suture repair, L pinky PIP dislocation S/P closed reduction with buddy splint, L clavicle fracture-- NWB Left UE, L nasal bone and L orbital rim fractures, and sternal fracture. No surgical intervention.  Admitted to rehab with cognitive deficits, gait and ADL impairment.      Comprehensive Multidisciplinary Rehab Program:  - Cont comprehensive rehab program, PT/OT/SLP 3 hours a day, 5 days a week.  - WBAT LLE, NWB LUE but ok to do ROM exercises for L elbow, wrist, hand and L shoulder pendulum exercises  --LSO brace whenever OOB  - Precautions: falls, sternal     #SAH  - Avoid NSAIDs  - F/u neurosurgery, Dr. Vikas Aquino, upon discharge    #Multiple fractures  - L 3-8 and R 7th rib fractures with occult L pneumothorax, sternal fracture, L iliac wing fracture with hematoma S/P 1U pRBCs, L forehead laceration S/P suture repair, L pinky PIP dislocation S/P closed reduction with buddy splint, L clavicle fracture, L nasal bone and L orbital rim fractures, and sternal fracture  - Sling to LUE for comfort. No lifting more than 1-3 pounds for 6 weeks.  - Continue Lovenox for 6 weeks total (4/22-6/3)  - Tylenol, Oxycodone PRN for pain control. Lidoderm to L chest wall.  - F/u plastic surgeon Dr. Nell Choe, for facial laceration, L pinky dislocation, L nasal bone and orbital rim fractures  - F/u ortho, Dr. Reji Cardoza, for L clavicle and L pelvic wing fractures  - F/u surgery, Dr. Dino Peña, for rib fractures  - CT T/L spine shows T5 compression fracture of indeterminate age and acute L3 fracture.  MRI 4/29--Acute to subacute benign compression fracture of L3 and left sacral insufficiency fracture.  No Cord Compression.  --Ortho consult Appreciated-- recommended LSO. Orthotist fit patient with LSO 4/29.  Needs LSO whenever OOB or sitting.  No laying down or sleeping in LSO.   -- Cont.  scheduled Oxycodone 10mg at 8am before therapy to help improve pain control    #Depression  - Per , patient was exhibiting signs of depression even before admission.  - On Lexapro 10mg daily.  - Psych consult reviewed and appreciated  -- Modafinil 50mg x 3 days then increased to 100mg 5/6    #Sleep  - d/c Melatonin   --trial Rozerem 8mg    Elevated Alk Phos  --d/w hospitalist  --check GGT and monitor Alk phos  --Hepatic sono ordered    #H/o SVT  - Likely AVNRT  - Continue Toprol XR 100mg daily  - F/u EP Dr. Morgan Hernández, outpatient in 3 months, may consider elective ablation.    #R kidney lesion  - F/u Dr. Herrera outpatient or PCP    #Hypothyroidism  - Continue Synthroid 112mcg daily    GI/Bowel:  - At risk for constipation due to neurologic diagnosis, immobility and/or medication use  - Senna QHS, Miralax Daily    /Bladder:   - At risk for incontinence and retention due to neurologic diagnosis and limited mobility  - Avila placed 4/29/21 for urinary retention  --Increased bethanachol to 10mg BID 5/6  -- Plan for avila removal  tonight for TOV in AM      Skin/Pressure Injury:   - At risk for pressure injury due to neurologic diagnosis and relative immobility.  - Skin assessment on admission: incontinence associated dermatitis at inner thighs, groin and buttocks. Cont desitin and nystatin powder to affected areas.   - Monitor Incisions: L facial laceration  - Turn every 2 hours while in bed, air mattress  - Soft heel protectors  - Skin barrier cream as needed  - Nursing to monitor skin Qshift    DVT ppx:  - Lovenox  - SCDs    IDT 5/6/21:  - SW: lives with son and spouse, has HHA for iADLs but able to do ADLs independently. Owns cane and RW.  - OT: Mark eating, Mod A grooming, dressing and toileting and shower transfer, Limited by memory deficits and poor carryover. Goal: supervision grooming, bathing LBD, Barb UBD and toilet transfers, Mark shower transfers  - PT: min-mod A transfer. Mod A 20 ft WBQC, no stairs. Limited by poor carryover of spinal precautions.  Goals: Mark in 3 weeks.  - SLP: regular/thins. Severe cognitive deficits, decreased attention and memory, decreased insight and increased processing speed.  - EDOD: 5/19 to home --will need 24 h supervision  ---------------  Outpatient Follow-up (Specialty/Name of physician):  Silvina Herrera  INTERNAL MEDICINE  [address] [place] [address]  Phone: [phone]  Fax: [phone]  Follow Up Time: 1 week    Vikas Aquino)  Neurosurgery  [address] [place] [id number]  Phone: [phone]  Fax: [phone]  Follow Up Time: 2 weeks    Nell Choe)  Plastic Surgery  [address] [facility] [facility] [address]  [place], [place] [id number]  Phone: [phone]  Fax: [phone]  Follow Up Time: 1 week    Reji Cardoza)  Orthopaedic Surgery  [address] [facility] [address]  [place] [address]  Phone: [phone]  Fax: [phone]  Follow Up Time: Routine    Dino Peña)  Surgery; Surgical Critical Care  [address] [facility], [address] [address]  Phone: [phone]  Fax: [phone]  Follow Up Time: Routine    Morgan Hernández; PhD)  Cardiac Electrophysiology; Cardiovascular Disease; Internal Medicine  [facility] - Dept of Cardiology, [address] [place] [address]  Phone: [phone]  Fax: [phone]

## 2021-05-06 NOTE — PROGRESS NOTE ADULT - SUBJECTIVE AND OBJECTIVE BOX
Patient is a 65y old  Female who presents with a chief complaint of SAH and fractures of left-sided ribs, left iliac wing with hematoma, S/P closed reduction of left pinky PIP dislocation after trauma (05 May 2021 13:00)      Patient seen and examined at bedside.  No overnight events  No complaints this morning  Had BM    ALLERGIES:  Tapazole (Hives)    MEDICATIONS  (STANDING):  bethanechol 10 milliGRAM(s) Oral two times a day  enoxaparin Injectable 40 milliGRAM(s) SubCutaneous every 24 hours  escitalopram 10 milliGRAM(s) Oral daily  folic acid 1 milliGRAM(s) Oral daily  levothyroxine 112 MICROGram(s) Oral daily  lidocaine   Patch 1 Patch Transdermal daily  melatonin 9 milliGRAM(s) Oral at bedtime  metoprolol succinate  milliGRAM(s) Oral daily  modafinil 100 milliGRAM(s) Oral <User Schedule>  multivitamin 1 Tablet(s) Oral daily  nystatin Powder 1 Application(s) Topical two times a day  oxyCODONE    IR 10 milliGRAM(s) Oral <User Schedule>  polyethylene glycol 3350 17 Gram(s) Oral daily  senna 2 Tablet(s) Oral at bedtime  zinc oxide 40% Ointment 1 Application(s) Topical two times a day    MEDICATIONS  (PRN):  acetaminophen   Tablet .. 650 milliGRAM(s) Oral every 6 hours PRN Temp greater or equal to 38C (100.4F), Mild Pain (1 - 3)  oxyCODONE    IR 10 milliGRAM(s) Oral every 6 hours PRN Severe Pain (7 - 10)  oxyCODONE    IR 5 milliGRAM(s) Oral every 4 hours PRN Moderate Pain (4 - 6)    Vital Signs Last 24 Hrs  T(F): 98.1 (06 May 2021 07:56), Max: 98.5 (05 May 2021 19:47)  HR: 75 (06 May 2021 07:56) (70 - 75)  BP: 111/71 (06 May 2021 07:56) (111/71 - 121/66)  RR: 14 (06 May 2021 07:56) (14 - 14)  SpO2: 95% (06 May 2021 07:56) (95% - 95%)  I&O's Summary    05 May 2021 07:01  -  06 May 2021 07:00  --------------------------------------------------------  IN: 0 mL / OUT: 750 mL / NET: -750 mL      PHYSICAL EXAM:  GENERAL: NAD  HENT:  Atraumatic, Normocephalic; No tonsillar erythema, exudates, or enlargement; Moist mucous membranes;   EYES: EOMI, PERRLA, conjunctiva and sclera clear, no lid-lag  NECK: Supple, No JVD, Normal thyroid  CHEST/LUNG: Clear to percussion bilaterally; No rales, rhonchi, wheezing, or rubs; normal respiratory effort, no intercostal retractions  HEART: Regular rate and rhythm; No murmurs, rubs, or gallops  ABDOMEN: Soft, Nontender, Nondistended; Bowel sounds present; No HSM  MUSCULOSKELETAL/EXTREMITIES:  2+ Peripheral Pulses, No clubbing, cyanosis, or peripheral edema; No digital cyanosis; left arm in sling  PSYCH: Appropriate affect, Alert & Awake    LABS:                        11.1   12.14 )-----------( 369      ( 06 May 2021 06:15 )             33.8       05-06    142  |  105  |  20  ----------------------------<  149  4.3   |  23  |  0.69    Ca    8.9      06 May 2021 06:15    TPro  6.8  /  Alb  3.1  /  TBili  0.7  /  DBili  x   /  AST  16  /  ALT  24  /  AlkPhos  486  05-06     eGFR if Non African American: 90 mL/min/1.73M2 (05-06-21 @ 06:15)  eGFR if African American: 105 mL/min/1.73M2 (05-06-21 @ 06:15)     Care Discussed with Consultants/Other Providers: Yes   Patient is a 65y old  Female who presents with a chief complaint of SAH and fractures of left-sided ribs, left iliac wing with hematoma, S/P closed reduction of left pinky PIP dislocation after trauma (05 May 2021 13:00)      Patient seen and examined at bedside.  No overnight events  No complaints this morning  Had BM    ALLERGIES:  Tapazole (Hives)    MEDICATIONS  (STANDING):  bethanechol 10 milliGRAM(s) Oral two times a day  enoxaparin Injectable 40 milliGRAM(s) SubCutaneous every 24 hours  escitalopram 10 milliGRAM(s) Oral daily  folic acid 1 milliGRAM(s) Oral daily  levothyroxine 112 MICROGram(s) Oral daily  lidocaine   Patch 1 Patch Transdermal daily  melatonin 9 milliGRAM(s) Oral at bedtime  metoprolol succinate  milliGRAM(s) Oral daily  modafinil 100 milliGRAM(s) Oral <User Schedule>  multivitamin 1 Tablet(s) Oral daily  nystatin Powder 1 Application(s) Topical two times a day  oxyCODONE    IR 10 milliGRAM(s) Oral <User Schedule>  polyethylene glycol 3350 17 Gram(s) Oral daily  senna 2 Tablet(s) Oral at bedtime  zinc oxide 40% Ointment 1 Application(s) Topical two times a day    MEDICATIONS  (PRN):  acetaminophen   Tablet .. 650 milliGRAM(s) Oral every 6 hours PRN Temp greater or equal to 38C (100.4F), Mild Pain (1 - 3)  oxyCODONE    IR 10 milliGRAM(s) Oral every 6 hours PRN Severe Pain (7 - 10)  oxyCODONE    IR 5 milliGRAM(s) Oral every 4 hours PRN Moderate Pain (4 - 6)    Vital Signs Last 24 Hrs  T(F): 98.1 (06 May 2021 07:56), Max: 98.5 (05 May 2021 19:47)  HR: 75 (06 May 2021 07:56) (70 - 75)  BP: 111/71 (06 May 2021 07:56) (111/71 - 121/66)  RR: 14 (06 May 2021 07:56) (14 - 14)  SpO2: 95% (06 May 2021 07:56) (95% - 95%)  I&O's Summary    05 May 2021 07:01  -  06 May 2021 07:00  --------------------------------------------------------  IN: 0 mL / OUT: 750 mL / NET: -750 mL      PHYSICAL EXAM:  GENERAL: NAD  HENT:  Atraumatic, Normocephalic; No tonsillar erythema, exudates, or enlargement; Moist mucous membranes;   EYES: EOMI, PERRLA, conjunctiva and sclera clear, no lid-lag  NECK: Supple, No JVD, Normal thyroid  CHEST/LUNG: Clear to percussion bilaterally; No rales, rhonchi, wheezing, or rubs; normal respiratory effort, no intercostal retractions  HEART: Regular rate and rhythm; No murmurs, rubs, or gallops  ABDOMEN: Soft, Nontender, Nondistended; Bowel sounds present; No HSM  MUSCULOSKELETAL/EXTREMITIES:  2+ Peripheral Pulses, No clubbing, cyanosis, or peripheral edema; No digital cyanosis; left arm in sling  PSYCH: Appropriate affect, Alert & Awake  : + Spangler    LABS:                        11.1   12.14 )-----------( 369      ( 06 May 2021 06:15 )             33.8       05-06    142  |  105  |  20  ----------------------------<  149  4.3   |  23  |  0.69    Ca    8.9      06 May 2021 06:15    TPro  6.8  /  Alb  3.1  /  TBili  0.7  /  DBili  x   /  AST  16  /  ALT  24  /  AlkPhos  486  05-06     eGFR if Non African American: 90 mL/min/1.73M2 (05-06-21 @ 06:15)  eGFR if African American: 105 mL/min/1.73M2 (05-06-21 @ 06:15)     Care Discussed with Consultants/Other Providers: Yes

## 2021-05-06 NOTE — PROGRESS NOTE ADULT - SUBJECTIVE AND OBJECTIVE BOX
HPI:  65F PMHx breast cancer s/p mastectomy, HTN, hypothyroidism, not on full anticoagulation/antiplatelets per EMS, presented to [facility] 4/19/21as a level 2 trauma activation after a fall from a flight of stairs. Patient only able to say "ow" in trauma bay, unable to provide further history. However per son, normal mentation at baseline. In the trauma bay, airway was intact with bilateral breath sounds, O2 saturation 100% on room air. Initially blood pressure was 140s systolic however on repeat 90s. NS@100 initiated. Secondary significant for left forehead laceration, left chest wall tenderness, left pelvic tenderness.     Ms. Arthur was hypotensive in the trauma bay and was transferred to the Surgical ICU after imaging was obtained. Her injury list is below.  (1) SAH, bilateral: stable on repeat imaging. Seven day course of Keppra for post-traumatic seizure prophylaxis.  (2) left 3-8 and right 7th rib fractures with occult left pneumothorax: pain control  (3) left iliac wing fracture with hematoma: required transfusion early in her ICU course, after which CBC were stable  (4) left forehead laceration s/p suture repair  (5) s/p successful closed reduction of left pinky PIP dislocation with delfin splint  (6) left clavicle fracture  (7) left nasal bone and left orbital rim fractures  (8) sternal fracture  Incidental findings: right renal lesion. Pt's  was notified and copy of the report given to him.     The patient was admitted to Trauma Surgery under SICU care.  Ortho was consulted for L. iliac wing fx, sternal fx, L. clavicle fx- recommended WBAT of the affected left lower extremity with walker, WBAT L shoulder, pendulum exercises okay, no lifting, sling for comfort, Encourage ROM of elbow/wrist/hand, PT/OT consult, NO urgent orthopedic surgical intervention at this time.    Neurosurgery consulted- recommended repeat CTH, which was stable.     PRS was consulted for non displaced left superior orbital rim and non displaced left nasal bone fracture--> No indication for operative fixation of these non displaced fractures; recommend ice to face to aid in swelling and analgesia.    She was placed on hemorrhage watch in the SICU given pelvic fracture and surrounding hematoma.  Behavior health was consulted for delirium.    Hand was consulted for L. pinky PIP dislocation- s/p closed reduction, rec Cont delfin taping for now.    4/21- CTH stable  - Added home lexapro  - Added NS @ 50cc/hr to supplement poor PO intake  - Lactate cleared (1.9)    4/22- Transfused 1u pRBC, Hct 20.9 -> 26.9  - Went into SVT, given adenosine x1 and started on metoprolol 25mg q12hr  - Added oxycodone 5mg q4hrs prn for increased pain control  - PO intake improved, IVL    4/23- EP was consulted for SVT, recc continue lopressor 25mg   Metoprolol PO increased from 25mg q12hrs to 25mg q6hrs.   - Added salt tabs 1g q8hrs and started on NS @ 50cc/hr for hyponatremia  - Avila discontinued, passed TOV  - Added lovenox  - Cervical collar cleared by confrontational exam     4/24- Patient transferred from SICU to surgical floor in stable condition.  Discharge planning to acute rehab.     Stable from neurologic perspective consistent with mild TBI  Multiple rib fractures - breathing comfortable with multimodal pain control    On day of discharge, the patient was tolerating diet, working with PT well and pain controlled. The patient was medically stable for discharge to acute rehab with instructions for outpatient follow up 4/27/21. 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 c/o poor sleep.  Pain controlled.  Participating in therapy      VITALS  Vital Signs Last 24 Hrs  T(C): 36.7 (06 May 2021 07:56), Max: 36.9 (05 May 2021 19:47)  T(F): 98.1 (06 May 2021 07:56), Max: 98.5 (05 May 2021 19:47)  HR: 75 (06 May 2021 07:56) (70 - 75)  BP: 111/71 (06 May 2021 07:56) (111/71 - 121/66)  BP(mean): --  RR: 14 (06 May 2021 07:56) (14 - 14)  SpO2: 95% (06 May 2021 07:56) (95% - 95%)    REVIEW OF SYMPTOMS  Neurological deficits--cognitive  Pain from multiple fractures    Physical Exam:  Gen - NAD, Comfortable  HEENT - periorbital ecchymoses, EOMI, MMM  Pulm - CTAB, No wheeze  Cardiovascular - RRR, S1S2, No m/r/g  Abdomen - Soft, NT/ND, +BS  Extremities - No C/C/E, No calf tenderness  Neuro-     Cognitive - AAOx2 and Knew  year      Communication - Fluent, delayed. Able to repeat.      Attention: Impaired. Requires frequent repetition and cueing.      Memory: Recall 3/3 objects immediate and 0/3 5 min later         Cranial Nerves - CN 2-12 intact     Motor -                    LEFT    UE - Limited due to left clavicle fracture. Able to wiggle fingers and extend wrist and bend elbow about 3-4/5.                     RIGHT UE - ShAB 5/5, EF 5/5, EE 5/5, WE 5/5,  5/5                    LEFT    LE - HF 4/5-, KE 5/5, DF 5/5, PF 5/5                    RIGHT LE - HF 5/5, KE 5/5, DF 5/5, PF 5/5        Sensory - Intact to LT     Reflexes - DTR Intact, No primitive reflex     Coordination - FTN intact     Tone - normal  Psychiatric - Mood stable, Affect WNL    : avila     RECENT LABS                        11.1   12.14 )-----------( 369      ( 06 May 2021 06:15 )             33.8     05-06    142  |  105  |  20  ----------------------------<  149<H>  4.3   |  23  |  0.69    Ca    8.9      06 May 2021 06:15    TPro  6.8  /  Alb  3.1<L>  /  TBili  0.7  /  DBili  x   /  AST  16  /  ALT  24  /  AlkPhos  486<H>  05-06            RADIOLOGY/OTHER RESULTS      MEDICATIONS  (STANDING):  bethanechol 10 milliGRAM(s) Oral two times a day  enoxaparin Injectable 40 milliGRAM(s) SubCutaneous every 24 hours  escitalopram 10 milliGRAM(s) Oral daily  folic acid 1 milliGRAM(s) Oral daily  levothyroxine 112 MICROGram(s) Oral daily  lidocaine   Patch 1 Patch Transdermal daily  melatonin 9 milliGRAM(s) Oral at bedtime  metoprolol succinate  milliGRAM(s) Oral daily  modafinil 100 milliGRAM(s) Oral <User Schedule>  multivitamin 1 Tablet(s) Oral daily  nystatin Powder 1 Application(s) Topical two times a day  oxyCODONE    IR 10 milliGRAM(s) Oral <User Schedule>  polyethylene glycol 3350 17 Gram(s) Oral daily  senna 2 Tablet(s) Oral at bedtime  zinc oxide 40% Ointment 1 Application(s) Topical two times a day    MEDICATIONS  (PRN):  acetaminophen   Tablet .. 650 milliGRAM(s) Oral every 6 hours PRN Temp greater or equal to 38C (100.4F), Mild Pain (1 - 3)  oxyCODONE    IR 5 milliGRAM(s) Oral every 4 hours PRN Moderate Pain (4 - 6)  oxyCODONE    IR 10 milliGRAM(s) Oral every 6 hours PRN Severe Pain (7 - 10)

## 2021-05-07 LAB
ALBUMIN SERPL ELPH-MCNC: 2.8 G/DL — LOW (ref 3.3–5)
ALP SERPL-CCNC: 431 U/L — HIGH (ref 40–120)
ALT FLD-CCNC: 20 U/L — SIGNIFICANT CHANGE UP (ref 10–45)
AST SERPL-CCNC: 12 U/L — SIGNIFICANT CHANGE UP (ref 10–40)
BASOPHILS # BLD AUTO: 0.03 K/UL — SIGNIFICANT CHANGE UP (ref 0–0.2)
BASOPHILS NFR BLD AUTO: 0.3 % — SIGNIFICANT CHANGE UP (ref 0–2)
BILIRUB DIRECT SERPL-MCNC: 0 MG/DL — SIGNIFICANT CHANGE UP (ref 0–0.2)
BILIRUB INDIRECT FLD-MCNC: 0.4 MG/DL — SIGNIFICANT CHANGE UP (ref 0.2–1)
BILIRUB SERPL-MCNC: 0.4 MG/DL — SIGNIFICANT CHANGE UP (ref 0.2–1.2)
EOSINOPHIL # BLD AUTO: 0.11 K/UL — SIGNIFICANT CHANGE UP (ref 0–0.5)
EOSINOPHIL NFR BLD AUTO: 1.3 % — SIGNIFICANT CHANGE UP (ref 0–6)
GGT SERPL-CCNC: 37 U/L — SIGNIFICANT CHANGE UP (ref 8–40)
HCT VFR BLD CALC: 31 % — LOW (ref 34.5–45)
HGB BLD-MCNC: 10.1 G/DL — LOW (ref 11.5–15.5)
IMM GRANULOCYTES NFR BLD AUTO: 0.7 % — SIGNIFICANT CHANGE UP (ref 0–1.5)
LYMPHOCYTES # BLD AUTO: 1.35 K/UL — SIGNIFICANT CHANGE UP (ref 1–3.3)
LYMPHOCYTES # BLD AUTO: 15.7 % — SIGNIFICANT CHANGE UP (ref 13–44)
MCHC RBC-ENTMCNC: 30.1 PG — SIGNIFICANT CHANGE UP (ref 27–34)
MCHC RBC-ENTMCNC: 32.6 GM/DL — SIGNIFICANT CHANGE UP (ref 32–36)
MCV RBC AUTO: 92.3 FL — SIGNIFICANT CHANGE UP (ref 80–100)
MONOCYTES # BLD AUTO: 0.64 K/UL — SIGNIFICANT CHANGE UP (ref 0–0.9)
MONOCYTES NFR BLD AUTO: 7.4 % — SIGNIFICANT CHANGE UP (ref 2–14)
NEUTROPHILS # BLD AUTO: 6.42 K/UL — SIGNIFICANT CHANGE UP (ref 1.8–7.4)
NEUTROPHILS NFR BLD AUTO: 74.6 % — SIGNIFICANT CHANGE UP (ref 43–77)
NRBC # BLD: 0 /100 WBCS — SIGNIFICANT CHANGE UP (ref 0–0)
PLATELET # BLD AUTO: 338 K/UL — SIGNIFICANT CHANGE UP (ref 150–400)
PROT SERPL-MCNC: 6.2 G/DL — SIGNIFICANT CHANGE UP (ref 6–8.3)
RBC # BLD: 3.36 M/UL — LOW (ref 3.8–5.2)
RBC # FLD: 15.8 % — HIGH (ref 10.3–14.5)
WBC # BLD: 8.61 K/UL — SIGNIFICANT CHANGE UP (ref 3.8–10.5)
WBC # FLD AUTO: 8.61 K/UL — SIGNIFICANT CHANGE UP (ref 3.8–10.5)

## 2021-05-07 PROCEDURE — 99232 SBSQ HOSP IP/OBS MODERATE 35: CPT

## 2021-05-07 PROCEDURE — 76700 US EXAM ABDOM COMPLETE: CPT | Mod: 26

## 2021-05-07 RX ADMIN — Medication 10 MILLIGRAM(S): at 05:12

## 2021-05-07 RX ADMIN — LIDOCAINE 1 PATCH: 4 CREAM TOPICAL at 12:39

## 2021-05-07 RX ADMIN — Medication 1 MILLIGRAM(S): at 12:39

## 2021-05-07 RX ADMIN — SENNA PLUS 2 TABLET(S): 8.6 TABLET ORAL at 21:11

## 2021-05-07 RX ADMIN — NYSTATIN CREAM 1 APPLICATION(S): 100000 CREAM TOPICAL at 05:13

## 2021-05-07 RX ADMIN — OXYCODONE HYDROCHLORIDE 10 MILLIGRAM(S): 5 TABLET ORAL at 07:59

## 2021-05-07 RX ADMIN — NYSTATIN CREAM 1 APPLICATION(S): 100000 CREAM TOPICAL at 17:40

## 2021-05-07 RX ADMIN — ZINC OXIDE 1 APPLICATION(S): 200 OINTMENT TOPICAL at 17:40

## 2021-05-07 RX ADMIN — LIDOCAINE 1 PATCH: 4 CREAM TOPICAL at 19:21

## 2021-05-07 RX ADMIN — LIDOCAINE 1 PATCH: 4 CREAM TOPICAL at 00:00

## 2021-05-07 RX ADMIN — Medication 10 MILLIGRAM(S): at 17:40

## 2021-05-07 RX ADMIN — OXYCODONE HYDROCHLORIDE 10 MILLIGRAM(S): 5 TABLET ORAL at 08:59

## 2021-05-07 RX ADMIN — Medication 1 TABLET(S): at 12:39

## 2021-05-07 RX ADMIN — MODAFINIL 100 MILLIGRAM(S): 200 TABLET ORAL at 05:12

## 2021-05-07 RX ADMIN — ZINC OXIDE 1 APPLICATION(S): 200 OINTMENT TOPICAL at 05:14

## 2021-05-07 RX ADMIN — ESCITALOPRAM OXALATE 10 MILLIGRAM(S): 10 TABLET, FILM COATED ORAL at 12:39

## 2021-05-07 RX ADMIN — ENOXAPARIN SODIUM 40 MILLIGRAM(S): 100 INJECTION SUBCUTANEOUS at 05:12

## 2021-05-07 RX ADMIN — OXYCODONE HYDROCHLORIDE 5 MILLIGRAM(S): 5 TABLET ORAL at 06:12

## 2021-05-07 RX ADMIN — POLYETHYLENE GLYCOL 3350 17 GRAM(S): 17 POWDER, FOR SOLUTION ORAL at 12:39

## 2021-05-07 RX ADMIN — OXYCODONE HYDROCHLORIDE 5 MILLIGRAM(S): 5 TABLET ORAL at 05:12

## 2021-05-07 RX ADMIN — Medication 8 MILLIGRAM(S): at 21:11

## 2021-05-07 RX ADMIN — Medication 112 MICROGRAM(S): at 05:12

## 2021-05-07 NOTE — PROGRESS NOTE ADULT - SUBJECTIVE AND OBJECTIVE BOX
Patient is a 65y old  Female who presents with a chief complaint of SAH and fractures of left-sided ribs, left iliac wing with hematoma, S/P closed reduction of left pinky PIP dislocation after trauma (06 May 2021 14:42)      Patient seen and examined at bedside.  No overnight events  States she didn't sleep well last night    ALLERGIES:  Tapazole (Hives)    MEDICATIONS  (STANDING):  bethanechol 10 milliGRAM(s) Oral two times a day  enoxaparin Injectable 40 milliGRAM(s) SubCutaneous every 24 hours  escitalopram 10 milliGRAM(s) Oral daily  folic acid 1 milliGRAM(s) Oral daily  levothyroxine 112 MICROGram(s) Oral daily  lidocaine   Patch 1 Patch Transdermal daily  metoprolol succinate  milliGRAM(s) Oral daily  modafinil 100 milliGRAM(s) Oral <User Schedule>  multivitamin 1 Tablet(s) Oral daily  nystatin Powder 1 Application(s) Topical two times a day  oxyCODONE    IR 10 milliGRAM(s) Oral <User Schedule>  polyethylene glycol 3350 17 Gram(s) Oral daily  ramelteon 8 milliGRAM(s) Oral at bedtime  senna 2 Tablet(s) Oral at bedtime  zinc oxide 40% Ointment 1 Application(s) Topical two times a day    MEDICATIONS  (PRN):  acetaminophen   Tablet .. 650 milliGRAM(s) Oral every 6 hours PRN Temp greater or equal to 38C (100.4F), Mild Pain (1 - 3)  oxyCODONE    IR 5 milliGRAM(s) Oral every 4 hours PRN Moderate Pain (4 - 6)  oxyCODONE    IR 10 milliGRAM(s) Oral every 6 hours PRN Severe Pain (7 - 10)    Vital Signs Last 24 Hrs  T(F): 98.1 (07 May 2021 08:02), Max: 98.2 (06 May 2021 19:47)  HR: 78 (07 May 2021 08:02) (78 - 93)  BP: 109/71 (07 May 2021 08:02) (107/72 - 113/70)  RR: 14 (07 May 2021 08:02) (14 - 14)  SpO2: 93% (07 May 2021 08:02) (93% - 95%)  I&O's Summary    06 May 2021 07:01  -  07 May 2021 07:00  --------------------------------------------------------  IN: 571 mL / OUT: 1550 mL / NET: -979 mL          PHYSICAL EXAM:  GENERAL: NAD  HENT:  Atraumatic, Normocephalic; No tonsillar erythema, exudates, or enlargement; Moist mucous membranes;   EYES: EOMI, PERRLA, conjunctiva and sclera clear, no lid-lag  NECK: Supple, No JVD, Normal thyroid  CHEST/LUNG: Clear to percussion bilaterally; No rales, rhonchi, wheezing, or rubs; normal respiratory effort, no intercostal retractions  HEART: Regular rate and rhythm; No murmurs, rubs, or gallops  ABDOMEN: Soft, Nontender, Nondistended; Bowel sounds present; No HSM  MUSCULOSKELETAL/EXTREMITIES:  left arm in sling, 2+ Peripheral Pulses, No clubbing, cyanosis, or peripheral edema  PSYCH: Appropriate affect, Alert & Awake    LABS:                        10.1   8.61  )-----------( 338      ( 07 May 2021 06:51 )             31.0       05-06    142  |  105  |  20  ----------------------------<  149  4.3   |  23  |  0.69    Ca    8.9      06 May 2021 06:15    TPro  6.2  /  Alb  2.8  /  TBili  0.4  /  DBili  0.0  /  AST  12  /  ALT  20  /  AlkPhos  431  05-07     eGFR if Non African American: 90 mL/min/1.73M2 (05-06-21 @ 06:15)  eGFR if African American: 105 mL/min/1.73M2 (05-06-21 @ 06:15)      Care Discussed with Consultants/Other Providers: Yes

## 2021-05-07 NOTE — PROGRESS NOTE ADULT - ASSESSMENT
64 yo woman with PMHx breast cancer s/p mastectomy, HTN, Hypothyroidism admitted to [facility] after hospital course at [facility] after a fall from a flight of stairs, sustaining SAH, left-sided rib fractures, left iliac wing fracture with hematoma, left forehead laceration, s/p successful closed reduction of left pinky PIP dislocation, Acute L3 compression fracture, 4/29, admitted for rehab- pt/ot/dvt ppx pain meds    #Leukocytosis  - continue to monitor    #Normocytic Anemia with folic acid deficiency - improving  - likley blood loss anemia s/p traumatic Fall  - monitor H/H  - C/w folic acid 1mg po daily    #Elevated ALKPHOS  - possibly related to multiple fractures and GGT WNL  - avoid hepatotoxins  - trend LFTs  - encourage hydration    #Episodes of SVT   - Continue with toprol XL   - Can follow up in 3 months with EP Dr. Hernández [phone]. (elective ablation)    #Hypothyroidism  - Continue Synthroid     # Acute adjustment disorder with depressed mood    -  trial of modafinil for cognitive stimulation    # Insomnia  - melatonin     #Acute Hypoxic Respiratory Failure likely due to Acute B/L rib fractures, and small pneumothorax  - Incentive spirometry.   - Supplemental Oxygen as needed for O2 goal > 92%    #DVT PPX - Lovenox

## 2021-05-07 NOTE — PROGRESS NOTE ADULT - ASSESSMENT
Assessment/Plan:  JONATHAN CHILD is a 65y with PMHx breast Ca S/P mastectomy, HTN, hypothyroidism presented to [facility] 4/19/21 as level 2 trauma after falling down a flight of stairs. She was found to have bilateral frontoparietal SAH, L 3-8 and R 7th rib fractures with occult L pneumothorax, sternal fracture, L iliac wing fracture with hematoma S/P 1U pRBCs--WBAT Left LE, L forehead laceration S/P suture repair, L pinky PIP dislocation S/P closed reduction with buddy splint, L clavicle fracture-- NWB Left UE, L nasal bone and L orbital rim fractures, and sternal fracture. No surgical intervention.  Admitted to rehab with cognitive deficits, gait and ADL impairment.      Comprehensive Multidisciplinary Rehab Program:  - Cont comprehensive rehab program, PT/OT/SLP 3 hours a day, 5 days a week.  - WBAT LLE, NWB LUE but ok to do ROM exercises for L elbow, wrist, hand and L shoulder pendulum exercises  --LSO brace whenever OOB  - Precautions: falls, sternal     #SAH  - Avoid NSAIDs  - F/u neurosurgery, Dr. Vikas Aquino, upon discharge    #Multiple fractures  - L 3-8 and R 7th rib fractures with occult L pneumothorax, sternal fracture, L iliac wing fracture with hematoma S/P 1U pRBCs, L forehead laceration S/P suture repair, L pinky PIP dislocation S/P closed reduction with buddy splint, L clavicle fracture, L nasal bone and L orbital rim fractures, and sternal fracture  - Sling to LUE for comfort. No lifting more than 1-3 pounds for 6 weeks.  - Continue Lovenox for 6 weeks total (4/22-6/3)  - Tylenol, Oxycodone PRN for pain control. Lidoderm to L chest wall.  - F/u plastic surgeon Dr. Nell Choe, for facial laceration, L pinky dislocation, L nasal bone and orbital rim fractures  - F/u ortho, Dr. Reji Cardoza, for L clavicle and L pelvic wing fractures  - F/u surgery, Dr. Dino Peña, for rib fractures  - CT T/L spine shows T5 compression fracture of indeterminate age and acute L3 fracture.  MRI 4/29--Acute to subacute benign compression fracture of L3 and left sacral insufficiency fracture.  No Cord Compression.  --Ortho consult Appreciated-- recommended LSO. Orthotist fit patient with LSO 4/29.  Needs LSO whenever OOB or sitting.  No laying down or sleeping in LSO.   -- Cont.  scheduled Oxycodone 10mg at 8am before therapy to help improve pain control    #Depression  - Per , patient was exhibiting signs of depression even before admission.  - On Lexapro 10mg daily.  - Psych consult reviewed and appreciated  -- Modafinil 50mg x 3 days then increased to 100mg 5/6    #Sleep  - cont Rozerem 8mg    Elevated Alk Phos  --d/w hospitalist  - 5/7 GGT 37, alk phos 431. 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 Morgan Hernández, outpatient in 3 months, may consider elective ablation.    #R kidney lesion  - F/u Dr. Herrera outpatient or PCP    #Hypothyroidism  - Continue Synthroid 112mcg daily    GI/Bowel:  - At risk for constipation due to neurologic diagnosis, immobility and/or medication use  - Senna QHS, Miralax Daily    /Bladder:   - At risk for incontinence and retention due to neurologic diagnosis and limited mobility  - Spangler placed 4/29/21 for urinary retention. Removed 5/6 for TOV  --Increased bethanachol to 10mg BID 5/6  - 5/7, pt without void ovn. AM bladder scan 391, IC for 400cc  - start toileting program.       Skin/Pressure Injury:   - At risk for pressure injury due to neurologic diagnosis and relative immobility.  - Skin assessment on admission: incontinence associated dermatitis at inner thighs, groin and buttocks. Cont desitin and nystatin powder to affected areas.   - Monitor Incisions: L facial laceration  - Turn every 2 hours while in bed, air mattress  - Soft heel protectors  - Skin barrier cream as needed  - Nursing to monitor skin Qshift    DVT ppx:  - Lovenox  - SCDs    IDT 5/6/21:  - SW: lives with son and spouse, has HHA for iADLs but able to do ADLs independently. Owns cane and RW.  - OT: Mark eating, Mod A grooming, dressing and toileting and shower transfer, Limited by memory deficits and poor carryover. Goal: supervision grooming, bathing LBD, Barb UBD and toilet transfers, Mark shower transfers  - PT: min-mod A transfer. Mod A 20 ft WBQC, no stairs. Limited by poor carryover of spinal precautions.  Goals: Mark in 3 weeks.  - SLP: regular/thins. Severe cognitive deficits, decreased attention and memory, decreased insight and increased processing speed.  - EDOD: 5/19 to home --will need 24 h supervision  ---------------  Outpatient Follow-up (Specialty/Name of physician):  Silvina Herrera  INTERNAL MEDICINE  [address] [place] [address]  Phone: [phone]  Fax: [phone]  Follow Up Time: 1 week    Vikas Aquino)  Neurosurgery  [address] [place] [address]  Phone: [phone]  Fax: [phone]  Follow Up Time: 2 weeks    Nell Choe)  Plastic Surgery  [address] [facility], [address]  [place], [place] [id number]  Phone: [phone]  Fax: [phone]  Follow Up Time: 1 week    Reji Cardoza)  Orthopaedic Surgery  [address] [facility] [address]  [place] [address]  Phone: [phone]  Fax: [phone]  Follow Up Time: Routine    Dino Peña)  Surgery; Surgical Critical Care  [address], [place] [address]  Phone: [phone]  Fax: [phone]  Follow Up Time: Routine    Morgan Hernández; PhD)  Cardiac Electrophysiology; Cardiovascular Disease; Internal Medicine  [facility] - Dept of Cardiology, [address] [place] [address]  Phone: [phone]  Fax: [phone]   Assessment/Plan:  JONATHAN CHILD is a 65y with PMHx breast Ca S/P mastectomy, HTN, hypothyroidism presented to [facility] 4/19/21 as level 2 trauma after falling down a flight of stairs. She was found to have bilateral frontoparietal SAH, L 3-8 and R 7th rib fractures with occult L pneumothorax, sternal fracture, L iliac wing fracture with hematoma S/P 1U pRBCs--WBAT Left LE, L forehead laceration S/P suture repair, L pinky PIP dislocation S/P closed reduction with buddy splint, L clavicle fracture-- NWB Left UE, L nasal bone and L orbital rim fractures, and sternal fracture. No surgical intervention.  Admitted to rehab with cognitive deficits, gait and ADL impairment.      Comprehensive Multidisciplinary Rehab Program:  - Cont comprehensive rehab program, PT/OT/SLP 3 hours a day, 5 days a week.  - WBAT LLE, NWB LUE but ok to do ROM exercises for L elbow, wrist, hand and L shoulder pendulum exercises  --LSO brace whenever OOB  - Precautions: falls, sternal     #SAH  - Avoid NSAIDs  - F/u neurosurgery, Dr. Vikas Aquino, upon discharge    #Multiple fractures  - L 3-8 and R 7th rib fractures with occult L pneumothorax, sternal fracture, L iliac wing fracture with hematoma S/P 1U pRBCs, L forehead laceration S/P suture repair, L pinky PIP dislocation S/P closed reduction with buddy splint, L clavicle fracture, L nasal bone and L orbital rim fractures, and sternal fracture  - Sling to LUE for comfort. No lifting more than 1-3 pounds for 6 weeks.  - Continue Lovenox for 6 weeks total (4/22-6/3)  - Tylenol, Oxycodone PRN for pain control. Lidoderm to L chest wall.  - F/u plastic surgeon Dr. Nell Choe, for facial laceration, L pinky dislocation, L nasal bone and orbital rim fractures  - F/u ortho, Dr. Reji Cardoza, for L clavicle and L pelvic wing fractures  - F/u surgery, Dr. Dino Peña, for rib fractures  - CT T/L spine shows T5 compression fracture of indeterminate age and acute L3 fracture.  MRI 4/29--Acute to subacute benign compression fracture of L3 and left sacral insufficiency fracture.  No Cord Compression.  --Ortho consult Appreciated-- recommended LSO. Orthotist fit patient with LSO 4/29.  Needs LSO whenever OOB or sitting.  No laying down or sleeping in LSO.   -- Cont.  scheduled Oxycodone 10mg at 8am before therapy to help improve pain control    #Depression  - Per , patient was exhibiting signs of depression even before admission.  - On Lexapro 10mg daily.  - Psych consult reviewed and appreciated  -- Modafinil 50mg x 3 days then increased to 100mg 5/6    #Sleep  - cont Rozerem 8mg for another night  --if still issues sleeping, consider trial of rozerem 7.5mg qhs    Elevated Alk Phos  --d/w hospitalist  - 5/7 GGT 37, alk phos 431. 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 Morgan Hernández, outpatient in 3 months, may consider elective ablation.    #R kidney lesion  - F/u Dr. Herrera outpatient or PCP    #Hypothyroidism  - Continue Synthroid 112mcg daily    GI/Bowel:  - At risk for constipation due to neurologic diagnosis, immobility and/or medication use  - Senna QHS, Miralax Daily    /Bladder:   - At risk for incontinence and retention due to neurologic diagnosis and limited mobility  - Spangler placed 4/29/21 for urinary retention. Removed 5/6 for TOV  --Increased bethanachol to 10mg BID 5/6  - 5/7, pt without void ovn. AM bladder scan 391, IC for 400cc  - start toileting program.       Skin/Pressure Injury:   - At risk for pressure injury due to neurologic diagnosis and relative immobility.  - Skin assessment on admission: incontinence associated dermatitis at inner thighs, groin and buttocks. Cont desitin and nystatin powder to affected areas.   - Monitor Incisions: L facial laceration  - Turn every 2 hours while in bed, air mattress  - Soft heel protectors  - Skin barrier cream as needed  - Nursing to monitor skin Qshift    DVT ppx:  - Lovenox  - SCDs    IDT 5/6/21:  - SW: lives with son and spouse, has HHA for iADLs but able to do ADLs independently. Owns cane and RW.  - OT: Mark eating, Mod A grooming, dressing and toileting and shower transfer, Limited by memory deficits and poor carryover. Goal: supervision grooming, bathing LBD, Barb UBD and toilet transfers, Mark shower transfers  - PT: min-mod A transfer. Mod A 20 ft WBQC, no stairs. Limited by poor carryover of spinal precautions.  Goals: Mark in 3 weeks.  - SLP: regular/thins. Severe cognitive deficits, decreased attention and memory, decreased insight and increased processing speed.  - EDOD: 5/19 to home --will need 24 h supervision  ---------------  Outpatient Follow-up (Specialty/Name of physician):  Silvina Herrera  INTERNAL MEDICINE  [address] [place] [address]  Phone: [phone]  Fax: [phone]  Follow Up Time: 1 week    Vikas Aquino)  Neurosurgery  [address] [facility], [address] [place] [address]  Phone: [phone]  Fax: [phone]  Follow Up Time: 2 weeks    Nell Choe)  Plastic Surgery  [address] [facility], [address]  [place], [place] [id number]  Phone: [phone]  Fax: [phone]  Follow Up Time: 1 week    Reji Cardoza)  Orthopaedic Surgery  [address] [facility] [address]  [place] [address]  Phone: [phone]  Fax: [phone]  Follow Up Time: Routine    Dino Peña)  Surgery; Surgical Critical Care  [address] [facility], [address] [address]  Phone: [phone]  Fax: [phone]  Follow Up Time: Routine    Morgan Hernández; PhD)  Cardiac Electrophysiology; Cardiovascular Disease; Internal Medicine  [facility] - Dept of Cardiology, [address] [place] [address]  Phone: [phone]  Fax: [phone]

## 2021-05-07 NOTE — PROGRESS NOTE ADULT - SUBJECTIVE AND OBJECTIVE BOX
Patient is a 65y old  Female who presents with a chief complaint of SAH and fractures of left-sided ribs, left iliac wing with hematoma, S/P closed reduction of left pinky PIP dislocation after trauma (07 May 2021 09:31)      HPI:  65F PMHx breast cancer s/p mastectomy, HTN, hypothyroidism, not on full anticoagulation/antiplatelets per EMS, presented to [facility] 4/19/21as a level 2 trauma activation after a fall from a flight of stairs. Patient only able to say "ow" in trauma bay, unable to provide further history. However per son, normal mentation at baseline. In the trauma bay, airway was intact with bilateral breath sounds, O2 saturation 100% on room air. Initially blood pressure was 140s systolic however on repeat 90s. NS@100 initiated. Secondary significant for left forehead laceration, left chest wall tenderness, left pelvic tenderness.     Ms. Arthur was hypotensive in the trauma bay and was transferred to the Surgical ICU after imaging was obtained. Her injury list is below.  (1) SAH, bilateral: stable on repeat imaging. Seven day course of Keppra for post-traumatic seizure prophylaxis.  (2) left 3-8 and right 7th rib fractures with occult left pneumothorax: pain control  (3) left iliac wing fracture with hematoma: required transfusion early in her ICU course, after which CBC were stable  (4) left forehead laceration s/p suture repair  (5) s/p successful closed reduction of left pinky PIP dislocation with delfin splint  (6) left clavicle fracture  (7) left nasal bone and left orbital rim fractures  (8) sternal fracture  Incidental findings: right renal lesion. Pt's  was notified and copy of the report given to him.     The patient was admitted to Trauma Surgery under SICU care.  Ortho was consulted for L. iliac wing fx, sternal fx, L. clavicle fx- recommended WBAT of the affected left lower extremity with walker, WBAT L shoulder, pendulum exercises okay, no lifting, sling for comfort, Encourage ROM of elbow/wrist/hand, PT/OT consult, NO urgent orthopedic surgical intervention at this time.    Neurosurgery consulted- recommended repeat CTH, which was stable.     PRS was consulted for non displaced left superior orbital rim and non displaced left nasal bone fracture--> No indication for operative fixation of these non displaced fractures; recommend ice to face to aid in swelling and analgesia.    She was placed on hemorrhage watch in the SICU given pelvic fracture and surrounding hematoma.  Behavior health was consulted for delirium.    Hand was consulted for L. erlinda PIP dislocation- s/p closed reduction, rec Cont delfin taping for now.    4/21- CTH stable  - Added home lexapro  - Added NS @ 50cc/hr to supplement poor PO intake  - Lactate cleared (1.9)    4/22- Transfused 1u pRBC, Hct 20.9 -> 26.9  - Went into SVT, given adenosine x1 and started on metoprolol 25mg q12hr  - Added oxycodone 5mg q4hrs prn for increased pain control  - PO intake improved, IVL    4/23- EP was consulted for SVT, recc continue lopressor 25mg   Metoprolol PO increased from 25mg q12hrs to 25mg q6hrs.   - Added salt tabs 1g q8hrs and started on NS @ 50cc/hr for hyponatremia  - Avila discontinued, passed TOV  - Added lovenox  - Cervical collar cleared by confrontational exam     4/24- Patient transferred from SICU to surgical floor in stable condition.  Discharge planning to acute rehab.     Stable from neurologic perspective consistent with mild TBI  Multiple rib fractures - breathing comfortable with multimodal pain control    On day of discharge, the patient was tolerating diet, working with PT well and pain controlled. The patient was medically stable for discharge to acute rehab with instructions for outpatient follow up 4/27/21. (27 Apr 2021 13:27)        SUBJECTIVE: Patient seen and examined. No acute overnight events, slept poorly, was restless overnight, slept from 3am-5pm. TOV was initiated, patient was straight cath'd for 400cc this morning. Denies feeling urge to void. Denies suprapubic tenderness.   No other complaints.       REVIEW OF SYMPTOMS  Neurological deficits--cognitive  Pain from multiple fractures    VITALS  65y  Vital Signs Last 24 Hrs  T(C): 36.7 (07 May 2021 08:02), Max: 36.8 (06 May 2021 19:47)  T(F): 98.1 (07 May 2021 08:02), Max: 98.2 (06 May 2021 19:47)  HR: 78 (07 May 2021 08:02) (78 - 93)  BP: 109/71 (07 May 2021 08:02) (107/72 - 113/70)  BP(mean): --  RR: 14 (07 May 2021 08:02) (14 - 14)  SpO2: 93% (07 May 2021 08:02) (93% - 95%)  Daily     Daily         Physical Exam:  Gen - NAD, Comfortable  HEENT - periorbital ecchymoses, EOMI, MMM  Pulm - CTAB, No wheeze  Cardiovascular - RRR, S1S2, No m/r/g  Abdomen - Soft, NT/ND, +BS  Extremities - No C/C/E, No calf tenderness  Neuro-     Cognitive - AAOx3     Communication - Fluent, delayed. Able to repeat.      Attention: Impaired. Requires frequent repetition and cueing.      Memory: Recall 3/3 objects immediate and 0/3 5 min later         Cranial Nerves - CN 2-12 intact     Motor -                    LEFT    UE - Limited due to left clavicle fracture. Able to wiggle fingers and extend wrist and bend elbow about 3-4/5.                     RIGHT UE - ShAB 5/5, EF 5/5, EE 5/5, WE 5/5,  5/5                    LEFT    LE - HF 4/5-, KE 5/5, DF 5/5, PF 5/5                    RIGHT LE - HF 5/5, KE 5/5, DF 5/5, PF 5/5        Sensory - Intact to LT     Reflexes - DTR Intact, No primitive reflex     Coordination - FTN intact     Tone - normal  Psychiatric - Mood stable, Affect WNL    : avila removed 5/6        RECENT LABS:                        10.1   8.61  )-----------( 338      ( 07 May 2021 06:51 )             31.0     05-06    142  |  105  |  20  ----------------------------<  149<H>  4.3   |  23  |  0.69    Ca    8.9      06 May 2021 06:15    TPro  6.2  /  Alb  2.8<L>  /  TBili  0.4  /  DBili  0.0  /  AST  12  /  ALT  20  /  AlkPhos  431<H>  05-07    LIVER FUNCTIONS - ( 07 May 2021 06:51 )  Alb: 2.8 g/dL / Pro: 6.2 g/dL / ALK PHOS: 431 U/L / ALT: 20 U/L / AST: 12 U/L / GGT: 37 U/L               MEDICATIONS:  MEDICATIONS  (STANDING):  bethanechol 10 milliGRAM(s) Oral two times a day  enoxaparin Injectable 40 milliGRAM(s) SubCutaneous every 24 hours  escitalopram 10 milliGRAM(s) Oral daily  folic acid 1 milliGRAM(s) Oral daily  levothyroxine 112 MICROGram(s) Oral daily  lidocaine   Patch 1 Patch Transdermal daily  metoprolol succinate  milliGRAM(s) Oral daily  modafinil 100 milliGRAM(s) Oral <User Schedule>  multivitamin 1 Tablet(s) Oral daily  nystatin Powder 1 Application(s) Topical two times a day  oxyCODONE    IR 10 milliGRAM(s) Oral <User Schedule>  polyethylene glycol 3350 17 Gram(s) Oral daily  ramelteon 8 milliGRAM(s) Oral at bedtime  senna 2 Tablet(s) Oral at bedtime  zinc oxide 40% Ointment 1 Application(s) Topical two times a day    MEDICATIONS  (PRN):  acetaminophen   Tablet .. 650 milliGRAM(s) Oral every 6 hours PRN Temp greater or equal to 38C (100.4F), Mild Pain (1 - 3)  oxyCODONE    IR 5 milliGRAM(s) Oral every 4 hours PRN Moderate Pain (4 - 6)  oxyCODONE    IR 10 milliGRAM(s) Oral every 6 hours PRN Severe Pain (7 - 10)

## 2021-05-08 PROCEDURE — 99232 SBSQ HOSP IP/OBS MODERATE 35: CPT

## 2021-05-08 PROCEDURE — 99233 SBSQ HOSP IP/OBS HIGH 50: CPT

## 2021-05-08 RX ADMIN — ESCITALOPRAM OXALATE 10 MILLIGRAM(S): 10 TABLET, FILM COATED ORAL at 13:19

## 2021-05-08 RX ADMIN — LIDOCAINE 1 PATCH: 4 CREAM TOPICAL at 13:20

## 2021-05-08 RX ADMIN — SENNA PLUS 2 TABLET(S): 8.6 TABLET ORAL at 21:17

## 2021-05-08 RX ADMIN — ZINC OXIDE 1 APPLICATION(S): 200 OINTMENT TOPICAL at 05:14

## 2021-05-08 RX ADMIN — Medication 10 MILLIGRAM(S): at 16:47

## 2021-05-08 RX ADMIN — NYSTATIN CREAM 1 APPLICATION(S): 100000 CREAM TOPICAL at 05:15

## 2021-05-08 RX ADMIN — Medication 650 MILLIGRAM(S): at 05:14

## 2021-05-08 RX ADMIN — MODAFINIL 100 MILLIGRAM(S): 200 TABLET ORAL at 05:13

## 2021-05-08 RX ADMIN — LIDOCAINE 1 PATCH: 4 CREAM TOPICAL at 15:25

## 2021-05-08 RX ADMIN — Medication 8 MILLIGRAM(S): at 21:17

## 2021-05-08 RX ADMIN — Medication 100 MILLIGRAM(S): at 05:13

## 2021-05-08 RX ADMIN — OXYCODONE HYDROCHLORIDE 10 MILLIGRAM(S): 5 TABLET ORAL at 08:38

## 2021-05-08 RX ADMIN — Medication 1 TABLET(S): at 13:20

## 2021-05-08 RX ADMIN — Medication 650 MILLIGRAM(S): at 06:15

## 2021-05-08 RX ADMIN — NYSTATIN CREAM 1 APPLICATION(S): 100000 CREAM TOPICAL at 16:01

## 2021-05-08 RX ADMIN — Medication 1 MILLIGRAM(S): at 13:20

## 2021-05-08 RX ADMIN — POLYETHYLENE GLYCOL 3350 17 GRAM(S): 17 POWDER, FOR SOLUTION ORAL at 09:02

## 2021-05-08 RX ADMIN — ZINC OXIDE 1 APPLICATION(S): 200 OINTMENT TOPICAL at 19:42

## 2021-05-08 RX ADMIN — Medication 112 MICROGRAM(S): at 05:13

## 2021-05-08 RX ADMIN — ENOXAPARIN SODIUM 40 MILLIGRAM(S): 100 INJECTION SUBCUTANEOUS at 05:13

## 2021-05-08 RX ADMIN — Medication 10 MILLIGRAM(S): at 05:13

## 2021-05-08 RX ADMIN — OXYCODONE HYDROCHLORIDE 10 MILLIGRAM(S): 5 TABLET ORAL at 08:08

## 2021-05-08 RX ADMIN — LIDOCAINE 1 PATCH: 4 CREAM TOPICAL at 00:00

## 2021-05-08 NOTE — PROGRESS NOTE ADULT - SUBJECTIVE AND OBJECTIVE BOX
Cc: Gait dysfunction    HPI: Patient with no new medical issues today.  No acute events overnight.  Patient on a trial of void and currently being toileted every two hours but no void yet.  Bladder scans ordered for [id number].  Pain controlled, no chest pain, no N/V, no Fevers/Chills. No other new ROS  Has been tolerating rehabilitation program.    acetaminophen   Tablet .. 650 milliGRAM(s) Oral every 6 hours PRN  bethanechol 10 milliGRAM(s) Oral two times a day  enoxaparin Injectable 40 milliGRAM(s) SubCutaneous every 24 hours  escitalopram 10 milliGRAM(s) Oral daily  folic acid 1 milliGRAM(s) Oral daily  levothyroxine 112 MICROGram(s) Oral daily  lidocaine   Patch 1 Patch Transdermal daily  metoprolol succinate  milliGRAM(s) Oral daily  modafinil 100 milliGRAM(s) Oral <User Schedule>  multivitamin 1 Tablet(s) Oral daily  nystatin Powder 1 Application(s) Topical two times a day  oxyCODONE    IR 10 milliGRAM(s) Oral <User Schedule>  oxyCODONE    IR 5 milliGRAM(s) Oral every 4 hours PRN  oxyCODONE    IR 10 milliGRAM(s) Oral every 6 hours PRN  polyethylene glycol 3350 17 Gram(s) Oral daily  ramelteon 8 milliGRAM(s) Oral at bedtime  senna 2 Tablet(s) Oral at bedtime  zinc oxide 40% Ointment 1 Application(s) Topical two times a day      T(C): 36.6 (05-08-21 @ 07:58), Max: 36.8 (05-07-21 @ 20:05)  HR: 65 (05-08-21 @ 07:58) (65 - 84)  BP: 96/63 (05-08-21 @ 07:58) (96/63 - 119/72)  RR: 15 (05-08-21 @ 07:58) (14 - 15)  SpO2: 96% (05-08-21 @ 07:58) (94% - 96%)    In NAD  HEENT- EOMI  Heart- RRR, S1S2  Lungs- CTA bl.  Abd- + BS, NT, ND, normoactive BS  Ext- No calf pain  Neuro- Exam unchanged    Imp: Patient with diagnosis of traumatic subarachnoid hemorrhage admitted for comprehensive acute rehabilitation.    Plan:  - Continue therapies  - DVT prophylaxis  - TOV, bladder scan q6hrs with parameters for straight cath.  Will consider increasing frequency to q4hrs based on UOP.  - Skin- Turn q2h, check skin daily  - Continue current medications; patient medically stable.   - Patient is stable to continue current rehabilitation program.

## 2021-05-08 NOTE — PROGRESS NOTE ADULT - SUBJECTIVE AND OBJECTIVE BOX
Patient is a 65y old  Female who presents with a chief complaint of SAH and fractures of left-sided ribs, left iliac wing with hematoma, S/P closed reduction of left pinky PIP dislocation after trauma (07 May 2021 10:53)    Patient interviewed and examined w/ RN present; unable to void early this AM and had 500 cc PVR on straight cath. No BM since 5/6. Pain controlled denies CP  SOB HA     ALLERGIES:  Tapazole (Hives)    MEDICATIONS  (STANDING):  bethanechol 10 milliGRAM(s) Oral two times a day  enoxaparin Injectable 40 milliGRAM(s) SubCutaneous every 24 hours  escitalopram 10 milliGRAM(s) Oral daily  folic acid 1 milliGRAM(s) Oral daily  levothyroxine 112 MICROGram(s) Oral daily  lidocaine   Patch 1 Patch Transdermal daily  metoprolol succinate  milliGRAM(s) Oral daily  modafinil 100 milliGRAM(s) Oral <User Schedule>  multivitamin 1 Tablet(s) Oral daily  nystatin Powder 1 Application(s) Topical two times a day  oxyCODONE    IR 10 milliGRAM(s) Oral <User Schedule>  polyethylene glycol 3350 17 Gram(s) Oral daily  ramelteon 8 milliGRAM(s) Oral at bedtime  senna 2 Tablet(s) Oral at bedtime  zinc oxide 40% Ointment 1 Application(s) Topical two times a day    MEDICATIONS  (PRN):  acetaminophen   Tablet .. 650 milliGRAM(s) Oral every 6 hours PRN Temp greater or equal to 38C (100.4F), Mild Pain (1 - 3)  oxyCODONE    IR 5 milliGRAM(s) Oral every 4 hours PRN Moderate Pain (4 - 6)  oxyCODONE    IR 10 milliGRAM(s) Oral every 6 hours PRN Severe Pain (7 - 10)    Vital Signs Last 24 Hrs  T(F): 97.8 (08 May 2021 07:58), Max: 98.3 (07 May 2021 20:05)  HR: 65 (08 May 2021 07:58) (65 - 84)  BP: 96/63 (08 May 2021 07:58) (96/63 - 119/72)  RR: 15 (08 May 2021 07:58) (14 - 15)  SpO2: 96% (08 May 2021 07:58) (94% - 96%)  I&O's Summary    07 May 2021 07:01  -  08 May 2021 07:00  --------------------------------------------------------  IN: 0 mL / OUT: 1215 mL / NET: -1215 mL    08 May 2021 07:01  -  08 May 2021 09:03  --------------------------------------------------------  IN: 420 mL / OUT: 0 mL / NET: 420 mL      PHYSICAL EXAM:  General: NAD,   ENT: MMM  Neck: Supple, No JVD  Lungs: Clear to auscultation bilaterally  Cardio: RRR, S1/S2, No murmurs  Abdomen: Soft, Nontender, Nondistended; Bowel sounds present  Extremities: No calf tenderness, No pitting edema. Right UE sling    LABS:                        10.1   8.61  )-----------( 338      ( 07 May 2021 06:51 )             31.0       05-06    142  |  105  |  20  ----------------------------<  149  4.3   |  23  |  0.69    Ca    8.9      06 May 2021 06:15    TPro  6.2  /  Alb  2.8  /  TBili  0.4  /  DBili  0.0  /  AST  12  /  ALT  20  /  AlkPhos  431  05-07     eGFR if Non African American: 90 mL/min/1.73M2 (05-06-21 @ 06:15)  eGFR if African American: 105 mL/min/1.73M2 (05-06-21 @ 06:15)

## 2021-05-09 PROCEDURE — 99233 SBSQ HOSP IP/OBS HIGH 50: CPT

## 2021-05-09 PROCEDURE — 99232 SBSQ HOSP IP/OBS MODERATE 35: CPT

## 2021-05-09 RX ADMIN — POLYETHYLENE GLYCOL 3350 17 GRAM(S): 17 POWDER, FOR SOLUTION ORAL at 12:11

## 2021-05-09 RX ADMIN — LIDOCAINE 1 PATCH: 4 CREAM TOPICAL at 23:43

## 2021-05-09 RX ADMIN — LIDOCAINE 1 PATCH: 4 CREAM TOPICAL at 16:40

## 2021-05-09 RX ADMIN — ESCITALOPRAM OXALATE 10 MILLIGRAM(S): 10 TABLET, FILM COATED ORAL at 12:11

## 2021-05-09 RX ADMIN — NYSTATIN CREAM 1 APPLICATION(S): 100000 CREAM TOPICAL at 16:40

## 2021-05-09 RX ADMIN — Medication 112 MICROGRAM(S): at 06:03

## 2021-05-09 RX ADMIN — LIDOCAINE 1 PATCH: 4 CREAM TOPICAL at 01:05

## 2021-05-09 RX ADMIN — ZINC OXIDE 1 APPLICATION(S): 200 OINTMENT TOPICAL at 20:58

## 2021-05-09 RX ADMIN — Medication 10 MILLIGRAM(S): at 06:03

## 2021-05-09 RX ADMIN — OXYCODONE HYDROCHLORIDE 10 MILLIGRAM(S): 5 TABLET ORAL at 07:45

## 2021-05-09 RX ADMIN — Medication 650 MILLIGRAM(S): at 12:45

## 2021-05-09 RX ADMIN — Medication 10 MILLIGRAM(S): at 17:10

## 2021-05-09 RX ADMIN — Medication 650 MILLIGRAM(S): at 12:12

## 2021-05-09 RX ADMIN — OXYCODONE HYDROCHLORIDE 10 MILLIGRAM(S): 5 TABLET ORAL at 07:12

## 2021-05-09 RX ADMIN — NYSTATIN CREAM 1 APPLICATION(S): 100000 CREAM TOPICAL at 06:04

## 2021-05-09 RX ADMIN — ZINC OXIDE 1 APPLICATION(S): 200 OINTMENT TOPICAL at 06:03

## 2021-05-09 RX ADMIN — Medication 8 MILLIGRAM(S): at 21:40

## 2021-05-09 RX ADMIN — ENOXAPARIN SODIUM 40 MILLIGRAM(S): 100 INJECTION SUBCUTANEOUS at 06:02

## 2021-05-09 RX ADMIN — MODAFINIL 100 MILLIGRAM(S): 200 TABLET ORAL at 06:03

## 2021-05-09 RX ADMIN — SENNA PLUS 2 TABLET(S): 8.6 TABLET ORAL at 21:40

## 2021-05-09 RX ADMIN — LIDOCAINE 1 PATCH: 4 CREAM TOPICAL at 12:11

## 2021-05-09 RX ADMIN — Medication 1 MILLIGRAM(S): at 12:11

## 2021-05-09 RX ADMIN — Medication 1 TABLET(S): at 12:11

## 2021-05-09 NOTE — PROGRESS NOTE ADULT - SUBJECTIVE AND OBJECTIVE BOX
Patient is a 65y old  Female who presents with a chief complaint of SAH and fractures of left-sided ribs, left iliac wing with hematoma, S/P closed reduction of left pinky PIP dislocation after trauma (08 May 2021 12:34)      Patient seen and examined at bedside. No major issues reported and patient is without complaints. Pain is controlled at present and she denies HA CP SOB    ALLERGIES:  Tapazole (Hives)    MEDICATIONS  (STANDING):  bethanechol 10 milliGRAM(s) Oral two times a day  enoxaparin Injectable 40 milliGRAM(s) SubCutaneous every 24 hours  escitalopram 10 milliGRAM(s) Oral daily  folic acid 1 milliGRAM(s) Oral daily  levothyroxine 112 MICROGram(s) Oral daily  lidocaine   Patch 1 Patch Transdermal daily  metoprolol succinate  milliGRAM(s) Oral daily  modafinil 100 milliGRAM(s) Oral <User Schedule>  multivitamin 1 Tablet(s) Oral daily  nystatin Powder 1 Application(s) Topical two times a day  oxyCODONE    IR 10 milliGRAM(s) Oral <User Schedule>  polyethylene glycol 3350 17 Gram(s) Oral daily  ramelteon 8 milliGRAM(s) Oral at bedtime  senna 2 Tablet(s) Oral at bedtime  zinc oxide 40% Ointment 1 Application(s) Topical two times a day    MEDICATIONS  (PRN):  acetaminophen   Tablet .. 650 milliGRAM(s) Oral every 6 hours PRN Temp greater or equal to 38C (100.4F), Mild Pain (1 - 3)  oxyCODONE    IR 5 milliGRAM(s) Oral every 4 hours PRN Moderate Pain (4 - 6)  oxyCODONE    IR 10 milliGRAM(s) Oral every 6 hours PRN Severe Pain (7 - 10)    Vital Signs Last 24 Hrs  T(F): 98.4 (09 May 2021 07:08), Max: 98.4 (09 May 2021 07:08)  HR: 65 (09 May 2021 07:08) (65 - 77)  BP: 996/- (09 May 2021 07:08) (107/69 - 996/-)  RR: 15 (09 May 2021 07:08) (15 - 15)  SpO2: 96% (09 May 2021 07:08) (96% - 96%)  I&O's Summary    08 May 2021 07:01  -  09 May 2021 07:00  --------------------------------------------------------  IN: 1020 mL / OUT: 1350 mL / NET: -330 mL    PHYSICAL EXAM:  General: NAD, A/O x 3  ENT: MMM  Neck: Supple, No JVD  Lungs: Clear to auscultation bilaterally  Cardio: RRR, S1/S2, No murmurs  Abdomen: Soft, Nontender, Nondistended; Bowel sounds present  Extremities: No calf tenderness, No pitting edema; left UE sling in place  Neuro: stable slow juliann speech, mild hypophonia no new deficits    LABS:                        10.1   8.61  )-----------( 338      ( 07 May 2021 06:51 )             31.0           TPro  6.2  /  Alb  2.8  /  TBili  0.4  /  DBili  0.0  /  AST  12  /  ALT  20  /  AlkPhos  431  05-07

## 2021-05-09 NOTE — PROGRESS NOTE ADULT - ASSESSMENT
64 yo woman with PMHx breast cancer s/p mastectomy, HTN, Hypothyroidism admitted to [facility] after hospital course at [facility] after a fall from a flight of stairs, sustaining SAH, left-sided rib fractures, left iliac wing fracture with hematoma, left forehead laceration, s/p successful closed reduction of left pinky PIP dislocation, Acute L3 compression fracture, 4/29, admitted for rehab- pt/ot/dvt ppx pain meds    s/p fall SAH left sided rib and left iliac wing fx, L3fx  - continue comprehensive rehabilitation program per PMR  - pain meds per PMR team    Urinary retention  Constipation  - encouraged dulcolax after therapies today  - voiding likely to be easier if moves bowels regularly  - straight cath/ PVR per protocol    Normocytic Anemia with folic acid deficiency - stable  - monitor H/H    Elevated ALKPHOS  - probably 2/2 multiple fractures   - avoid hepatotoxins  - trend LFTs  - encourage hydration    Episodes of SVT   - Continue with toprol XL   - Can follow up in 3 months with EP Dr. Hernández [phone]. (elective ablation)    Hypothyroidism  - Continue Synthroid     Acute adjustment disorder with depressed mood    -  continue modafinil for cognitive stimulation    Insomnia  - melatonin     Acute Hypoxic Respiratory Failure likely due to Acute B/L rib fractures, and small pneumothorax  - Incentive spirometry.   - Supplemental Oxygen as needed for O2 goal > 92%    VTE PPX   - Lovenox    Attestation: I have personally interviewed and examined this patient, reviewed pertinent clinical information, and performed the evaluation and management services provided at today's visit for inpatient medical follow up    I am available to discuss any issues related to the medical care of this patient on the unit, or by phone at [phone]

## 2021-05-09 NOTE — PROGRESS NOTE ADULT - SUBJECTIVE AND OBJECTIVE BOX
Cc: Gait dysfunction    HPI: Patient with no new medical issues today.  No acute events overnight  Pain controlled, no chest pain, no N/V, no Fevers/Chills. No other new ROS  Has been tolerating rehabilitation program.    acetaminophen   Tablet .. 650 milliGRAM(s) Oral every 6 hours PRN  bethanechol 10 milliGRAM(s) Oral two times a day  enoxaparin Injectable 40 milliGRAM(s) SubCutaneous every 24 hours  escitalopram 10 milliGRAM(s) Oral daily  folic acid 1 milliGRAM(s) Oral daily  levothyroxine 112 MICROGram(s) Oral daily  lidocaine   Patch 1 Patch Transdermal daily  metoprolol succinate  milliGRAM(s) Oral daily  modafinil 100 milliGRAM(s) Oral <User Schedule>  multivitamin 1 Tablet(s) Oral daily  nystatin Powder 1 Application(s) Topical two times a day  oxyCODONE    IR 10 milliGRAM(s) Oral <User Schedule>  oxyCODONE    IR 5 milliGRAM(s) Oral every 4 hours PRN  oxyCODONE    IR 10 milliGRAM(s) Oral every 6 hours PRN  polyethylene glycol 3350 17 Gram(s) Oral daily  ramelteon 8 milliGRAM(s) Oral at bedtime  senna 2 Tablet(s) Oral at bedtime  zinc oxide 40% Ointment 1 Application(s) Topical two times a day      T(C): 36.6 (05-08-21 @ 07:58), Max: 36.8 (05-07-21 @ 20:05)  HR: 65 (05-08-21 @ 07:58) (65 - 84)  BP: 96/63 (05-08-21 @ 07:58) (96/63 - 119/72)  RR: 15 (05-08-21 @ 07:58) (14 - 15)  SpO2: 96% (05-08-21 @ 07:58) (94% - 96%)    In NAD  HEENT- EOMI  Heart- RRR, S1S2  Lungs- CTA bl.  Abd- + BS, NT, ND, normoactive BS  Ext- No calf pain, no swelling  Neuro- Exam unchanged    Imp: Patient with diagnosis of traumatic subarachnoid hemorrhage admitted for comprehensive acute rehabilitation.    Plan:  - Continue therapies  - DVT prophylaxis  - TOV, bladder scan q6hrs with parameters for straight cath.   Urine output 600 this AM.  Will increase bladder scan to q4hrs and reinforce timed toileting.  - Skin- Turn q2h, check skin daily  - Continue current medications; patient medically stable.   - Patient is stable to continue current rehabilitation program.

## 2021-05-10 LAB
ALBUMIN SERPL ELPH-MCNC: 2.9 G/DL — LOW (ref 3.3–5)
ALP SERPL-CCNC: 423 U/L — HIGH (ref 40–120)
ALT FLD-CCNC: 17 U/L — SIGNIFICANT CHANGE UP (ref 10–45)
ANION GAP SERPL CALC-SCNC: 9 MMOL/L — SIGNIFICANT CHANGE UP (ref 5–17)
AST SERPL-CCNC: 13 U/L — SIGNIFICANT CHANGE UP (ref 10–40)
BASOPHILS # BLD AUTO: 0.02 K/UL — SIGNIFICANT CHANGE UP (ref 0–0.2)
BASOPHILS NFR BLD AUTO: 0.3 % — SIGNIFICANT CHANGE UP (ref 0–2)
BILIRUB SERPL-MCNC: 0.4 MG/DL — SIGNIFICANT CHANGE UP (ref 0.2–1.2)
BUN SERPL-MCNC: 19 MG/DL — SIGNIFICANT CHANGE UP (ref 7–23)
CALCIUM SERPL-MCNC: 8.5 MG/DL — SIGNIFICANT CHANGE UP (ref 8.4–10.5)
CHLORIDE SERPL-SCNC: 106 MMOL/L — SIGNIFICANT CHANGE UP (ref 96–108)
CO2 SERPL-SCNC: 25 MMOL/L — SIGNIFICANT CHANGE UP (ref 22–31)
CREAT SERPL-MCNC: 0.56 MG/DL — SIGNIFICANT CHANGE UP (ref 0.5–1.3)
EOSINOPHIL # BLD AUTO: 0.07 K/UL — SIGNIFICANT CHANGE UP (ref 0–0.5)
EOSINOPHIL NFR BLD AUTO: 1 % — SIGNIFICANT CHANGE UP (ref 0–6)
GLUCOSE SERPL-MCNC: 112 MG/DL — HIGH (ref 70–99)
HCT VFR BLD CALC: 32.6 % — LOW (ref 34.5–45)
HGB BLD-MCNC: 10.8 G/DL — LOW (ref 11.5–15.5)
IMM GRANULOCYTES NFR BLD AUTO: 0.8 % — SIGNIFICANT CHANGE UP (ref 0–1.5)
LYMPHOCYTES # BLD AUTO: 1.39 K/UL — SIGNIFICANT CHANGE UP (ref 1–3.3)
LYMPHOCYTES # BLD AUTO: 19.5 % — SIGNIFICANT CHANGE UP (ref 13–44)
MCHC RBC-ENTMCNC: 30.7 PG — SIGNIFICANT CHANGE UP (ref 27–34)
MCHC RBC-ENTMCNC: 33.1 GM/DL — SIGNIFICANT CHANGE UP (ref 32–36)
MCV RBC AUTO: 92.6 FL — SIGNIFICANT CHANGE UP (ref 80–100)
MONOCYTES # BLD AUTO: 0.51 K/UL — SIGNIFICANT CHANGE UP (ref 0–0.9)
MONOCYTES NFR BLD AUTO: 7.1 % — SIGNIFICANT CHANGE UP (ref 2–14)
NEUTROPHILS # BLD AUTO: 5.09 K/UL — SIGNIFICANT CHANGE UP (ref 1.8–7.4)
NEUTROPHILS NFR BLD AUTO: 71.3 % — SIGNIFICANT CHANGE UP (ref 43–77)
NRBC # BLD: 0 /100 WBCS — SIGNIFICANT CHANGE UP (ref 0–0)
PLATELET # BLD AUTO: 277 K/UL — SIGNIFICANT CHANGE UP (ref 150–400)
POTASSIUM SERPL-MCNC: 4.1 MMOL/L — SIGNIFICANT CHANGE UP (ref 3.5–5.3)
POTASSIUM SERPL-SCNC: 4.1 MMOL/L — SIGNIFICANT CHANGE UP (ref 3.5–5.3)
PROT SERPL-MCNC: 6.2 G/DL — SIGNIFICANT CHANGE UP (ref 6–8.3)
RBC # BLD: 3.52 M/UL — LOW (ref 3.8–5.2)
RBC # FLD: 15.6 % — HIGH (ref 10.3–14.5)
SARS-COV-2 RNA SPEC QL NAA+PROBE: SIGNIFICANT CHANGE UP
SODIUM SERPL-SCNC: 140 MMOL/L — SIGNIFICANT CHANGE UP (ref 135–145)
WBC # BLD: 7.14 K/UL — SIGNIFICANT CHANGE UP (ref 3.8–10.5)
WBC # FLD AUTO: 7.14 K/UL — SIGNIFICANT CHANGE UP (ref 3.8–10.5)

## 2021-05-10 PROCEDURE — 99232 SBSQ HOSP IP/OBS MODERATE 35: CPT

## 2021-05-10 RX ORDER — OXYCODONE HYDROCHLORIDE 5 MG/1
10 TABLET ORAL
Refills: 0 | Status: DISCONTINUED | OUTPATIENT
Start: 2021-05-11 | End: 2021-05-18

## 2021-05-10 RX ADMIN — Medication 1 TABLET(S): at 12:04

## 2021-05-10 RX ADMIN — OXYCODONE HYDROCHLORIDE 10 MILLIGRAM(S): 5 TABLET ORAL at 09:01

## 2021-05-10 RX ADMIN — MODAFINIL 100 MILLIGRAM(S): 200 TABLET ORAL at 06:00

## 2021-05-10 RX ADMIN — Medication 1 MILLIGRAM(S): at 12:04

## 2021-05-10 RX ADMIN — LIDOCAINE 1 PATCH: 4 CREAM TOPICAL at 19:36

## 2021-05-10 RX ADMIN — Medication 112 MICROGRAM(S): at 06:00

## 2021-05-10 RX ADMIN — LIDOCAINE 1 PATCH: 4 CREAM TOPICAL at 17:27

## 2021-05-10 RX ADMIN — OXYCODONE HYDROCHLORIDE 10 MILLIGRAM(S): 5 TABLET ORAL at 08:05

## 2021-05-10 RX ADMIN — NYSTATIN CREAM 1 APPLICATION(S): 100000 CREAM TOPICAL at 06:02

## 2021-05-10 RX ADMIN — ESCITALOPRAM OXALATE 10 MILLIGRAM(S): 10 TABLET, FILM COATED ORAL at 12:04

## 2021-05-10 RX ADMIN — ZINC OXIDE 1 APPLICATION(S): 200 OINTMENT TOPICAL at 17:28

## 2021-05-10 RX ADMIN — ZINC OXIDE 1 APPLICATION(S): 200 OINTMENT TOPICAL at 06:01

## 2021-05-10 RX ADMIN — Medication 10 MILLIGRAM(S): at 17:27

## 2021-05-10 RX ADMIN — Medication 10 MILLIGRAM(S): at 06:00

## 2021-05-10 RX ADMIN — Medication 8 MILLIGRAM(S): at 21:31

## 2021-05-10 RX ADMIN — ENOXAPARIN SODIUM 40 MILLIGRAM(S): 100 INJECTION SUBCUTANEOUS at 06:01

## 2021-05-10 RX ADMIN — NYSTATIN CREAM 1 APPLICATION(S): 100000 CREAM TOPICAL at 17:28

## 2021-05-10 RX ADMIN — SENNA PLUS 2 TABLET(S): 8.6 TABLET ORAL at 21:31

## 2021-05-10 NOTE — PROGRESS NOTE ADULT - EYES COMMENTS
periorbital ecchymoses-improving, EOMI, MMM periorbital ecchymoses-improving, EOMI, MMM. no TTP, no scalp swelling

## 2021-05-10 NOTE — PROGRESS NOTE ADULT - ASSESSMENT
Assessment/Plan:  JONATHAN CHILD is a 65y with PMHx breast Ca S/P mastectomy, HTN, hypothyroidism presented to [facility] 4/19/21 as level 2 trauma after falling down a flight of stairs. She was found to have bilateral frontoparietal SAH, L 3-8 and R 7th rib fractures with occult L pneumothorax, sternal fracture, L iliac wing fracture with hematoma S/P 1U pRBCs--WBAT Left LE, L forehead laceration S/P suture repair, L pinky PIP dislocation S/P closed reduction with buddy splint, L clavicle fracture-- NWB Left UE, L nasal bone and L orbital rim fractures, and sternal fracture. No surgical intervention.  Admitted to rehab with cognitive deficits, gait and ADL impairment.      Comprehensive Multidisciplinary Rehab Program:  - Cont comprehensive rehab program, PT/OT/SLP 3 hours a day, 5 days a week.  - WBAT LLE, NWB LUE but ok to do ROM exercises for L elbow, wrist, hand and L shoulder pendulum exercises  --LSO brace whenever OOB  - Precautions: falls, sternal     #SAH  - Avoid NSAIDs  - F/u neurosurgery, Dr. Vikas Aquino, upon discharge    #Multiple fractures  - L 3-8 and R 7th rib fractures with occult L pneumothorax, sternal fracture, L iliac wing fracture with hematoma S/P 1U pRBCs, L forehead laceration S/P suture repair, L pinky PIP dislocation S/P closed reduction with buddy splint, L clavicle fracture, L nasal bone and L orbital rim fractures, and sternal fracture  - Sling to LUE for comfort. No lifting more than 1-3 pounds for 6 weeks.  - Continue Lovenox for 6 weeks total (4/22-6/3)  - Tylenol, Oxycodone PRN for pain control. Lidoderm to L chest wall.  - F/u plastic surgeon Dr. Nell Choe, for facial laceration, L pinky dislocation, L nasal bone and orbital rim fractures  - F/u ortho, Dr. Reji Cardoza, for L clavicle and L pelvic wing fractures  - F/u surgery, Dr. Dino Peña, for rib fractures  - CT T/L spine shows T5 compression fracture of indeterminate age and acute L3 fracture.  MRI 4/29--Acute to subacute benign compression fracture of L3 and left sacral insufficiency fracture.  No Cord Compression.  --Ortho consult Appreciated-- recommended LSO. Orthotist fit patient with LSO 4/29.  Needs LSO whenever OOB or sitting.  No laying down or sleeping in LSO.   -- Cont. scheduled Oxycodone 10mg at 8am before therapy to help improve pain control    #Depression  - Per , patient was exhibiting signs of depression even before admission.  - On Lexapro 10mg daily.  - Psych consult reviewed and appreciated  -Cont Modafinil 100mg (started 50mg x 3 days then increased to 100mg 5/6)    #Sleep  - cont Rozerem 8mg. If sleep not improved, may trial remeron 7.5 qhs    Elevated Alk Phos, improving  --d/w hospitalist, likely 2/2 trauma  - 5/7 GGT 37, alk phos 431 --> 5/10 Alk Phos 423. improving, Cont to monitor  --5/7 Hepatic sono: Increased echogenicity of the hepatic parenchyma suggests hepatic steatosis. No focal hepatic masses identified. Multiple gallstones, without evidence for gallbladder wall thickening or pericholecystic fluid.    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 Morgan Hernández, outpatient in 3 months, may consider elective ablation.    #R kidney lesion  - F/u Dr. Herrera outpatient or PCP    #Hypothyroidism  - Continue Synthroid 112mcg daily    GI/Bowel:  - At risk for constipation due to neurologic diagnosis, immobility and/or medication use  - Senna QHS, Miralax Daily    /Bladder:   - At risk for incontinence and retention due to neurologic diagnosis and limited mobility  - Spangler placed 4/29/21 for urinary retention. Removed 5/6 for TOV  --Increased bethanachol to 10mg BID 5/6  - 5/10, pt without void ovn, requires IC  - cont toileting program.   - 5/10 urology consult placed      Skin/Pressure Injury:   - At risk for pressure injury due to neurologic diagnosis and relative immobility.  - Skin assessment on admission: incontinence associated dermatitis at inner thighs, groin and buttocks. Cont desitin and nystatin powder to affected areas.   - Monitor Incisions: L facial laceration  - Turn every 2 hours while in bed, air mattress  - Soft heel protectors  - Skin barrier cream as needed  - Nursing to monitor skin Qshift    DVT ppx:  - Lovenox  - SCDs    IDT 5/6/21:  - SW: lives with son and spouse, has HHA for iADLs but able to do ADLs independently. Owns cane and RW.  - OT: Mark eating, Mod A grooming, dressing and toileting and shower transfer, Limited by memory deficits and poor carryover. Goal: supervision grooming, bathing LBD, Barb UBD and toilet transfers, Mark shower transfers  - PT: min-mod A transfer. Mod A 20 ft WBQC, no stairs. Limited by poor carryover of spinal precautions.  Goals: Mark in 3 weeks.  - SLP: regular/thins. Severe cognitive deficits, decreased attention and memory, decreased insight and increased processing speed.  - EDOD: 5/19 to home --will need 24 h supervision    ---------------  Outpatient Follow-up (Specialty/Name of physician):  Silvina Herrera  INTERNAL MEDICINE  [address] [place] [address]  Phone: [phone]  Fax: [phone]  Follow Up Time: 1 week    Vikas Aquino)  Neurosurgery  [address] [facility], [address] [place] [address]  Phone: [phone]  Fax: [phone]  Follow Up Time: 2 weeks    Nell Choe)  Plastic Surgery  [address] [facility], [address]  [place], [place] [id number]  Phone: [phone]  Fax: [phone]  Follow Up Time: 1 week    Reji Cardoza)  Orthopaedic Surgery  [address] [facility], [address]  [place] [address]  Phone: [phone]  Fax: [phone]  Follow Up Time: Routine    Dino Peña)  Surgery; Surgical Critical Care  [address] [facility], [address]  [place] [address]  Phone: [phone]  Fax: [phone]  Follow Up Time: Routine    Morgan Hernández; PhD)  Cardiac Electrophysiology; Cardiovascular Disease; Internal Medicine  [facility] - Dept of Cardiology, [address] [place] [address]  Phone: [phone]  Fax: [phone]     JONATHAN CHILD is a 65y with PMHx breast Ca S/P mastectomy, HTN, hypothyroidism who presented to [facility] 4/19/21 s/p fall down a flight of stairs with bilateral frontoparietal SAH; L nasal bone and L orbital rim fractures; sternal fracture; L 3-8, R 7th rib fractures;  L pneumothorax, L iliac wing fracture with hematoma S/P 1U pRBCs; left clavicle fx, left V finger PIP dislocation s/p CR. She is WBAT Left LE, NWB Left UE.     #SAH  - Avoid NSAIDs  - monitor neurological status  - F/u neurosurgery, Dr. Vikas Aquino, upon discharge  - Precautions: falls, sternal , WB as above, AMS, cardiac, h/o breast CA    # Multiple fractures  - Cont comprehensive rehab program, PT/OT/SLP 3 hours a day, 5 days a week.  - WBAT LLE, NWB LUE   - cleared for ROM exercises for L elbow, wrist, hand.  L shoulder pendulum exercises. Sling to LUE for comfort. No lifting more than 1-3 pounds for 6 weeks.  - LSO brace whenever OOB  - Continue Lovenox for 6 weeks total (4/22-6/3)  - Tylenol, Lidoderm to L chest wall. Scheduled Oxycodone 10mg at 8am before therapy to help improve pain control  - plastic surgeon Dr. Nell Choe, for facial laceration, L pinky dislocation, L nasal bone and orbital rim fractures  - ortho, Dr. Reji Cardoza, for L clavicle and L pelvic wing fractures  - surgery, Dr. Dino Peña, for rib fractures    # Acute Hypoxic Respiratory Failure likely due to Acute B/L rib fractures, and small pneumothorax  - Incentive spirometry.   - Supplemental Oxygen as needed for O2 goal > 92%    # Elevated Alk Phos  - Hepatic sono 5/7: Increased echogenicity of the hepatic parenchyma suggests hepatic steatosis. No focal hepatic masses identified. Multiple gallstones, without evidence for gallbladder wall thickening or pericholecystic fluid.  -  likely 2/2 trauma, bone fractures  - 5/7 GGT 37, alk phos 431 --> 5/10 Alk Phos 423. improving    # H/o SVT  - Continue with toprol XL   - Can follow up in 3 months with EP Dr. Hernández [phone]. (elective ablation)    #R kidney lesion  - F/u Dr. Herrera outpatient or PCP    #Hypothyroidism  - Continue Synthroid 112mcg daily    # Depression  - Per , premorbid signs of depression  - Psych consult reviewed and appreciated  - continue Lexapro 10mg daily  - Continue Modafinil 100mg daily. Tolerating    # Sleep  - cont Rozerem 8mg    # GI/Bowel:  - At risk for constipation due to neurologic diagnosis, immobility and/or medication use  - Senna QHS, Miralax Daily    # /Bladder:   - Spangler placed 4/29/21 for urinary retention. TOV 5/6   - Increased bethanachol to 10mg BID 5/6  - 5/10, still retaining requiring -600 ml  -  consult 5/10    # DVT ppx:  - Lovenox  - SCDs    # Case discussed in IDT 5/6/21:  - lives with son and spouse, has HHA for iADLs but able to do ADLs independently. Owns cane and RW.  - Mark eating, Mod A grooming, dressing and toileting and shower transfer, min-mod A transfer. Mod A 20 ft WBQC, no stairs. Limited by poor carryover of spinal precautions. Severe cognitive deficits, decreased attention and memory, decreased insight and increased processing speed.  - Goal: supervision grooming, bathing LBD, Barb UBD and toilet transfers, Mark shower transfers, Mark transfers and ambulation  - EDOD: 5/19 to home will need 24 h supervision  - caregiver training    #LABS  lovenox education   consult  [facility] 5/13    ---------------  Outpatient Follow-up (Specialty/Name of physician):  Silvina Herrera  INTERNAL MEDICINE  [address] [place] [address]  Phone: [phone]  Fax: [phone]  Follow Up Time: 1 week    Vikas Aquino)  Neurosurgery  [address] [facility], [address] [place] [address]  Phone: [phone]  Fax: [phone]  Follow Up Time: 2 weeks    Nell Choe)  Plastic Surgery  [address] [place] [address], [address]  [place], [place] [address]  Phone: [phone]  Fax: [phone]  Follow Up Time: 1 week    Reji Cardoza)  Orthopaedic Surgery  [address] [facility] [address]  [place] [address]  Phone: [phone]  Fax: [phone]  Follow Up Time: Routine    Dino Peña)  Surgery; Surgical Critical Care  [address] [facility] [address] [address]  Phone: [phone]  Fax: [phone]  Follow Up Time: Routine    Morgan Hernández; PhD)  Cardiac Electrophysiology; Cardiovascular Disease; Internal Medicine  [facility] - Dept of Cardiology, [address] [place] [address]  Phone: [phone]  Fax: [phone]

## 2021-05-10 NOTE — PROGRESS NOTE ADULT - MENTAL STATUS
Cognitive - AAOx3     Communication - Fluent, delayed. Able to repeat.      Attention: Impaired. Requires frequent repetition and cueing.      Memory: Recall 3/3 objects immediate and 0/3 5 min later

## 2021-05-10 NOTE — PROGRESS NOTE ADULT - COMMENTS
Neurological deficits--cognitive  Pain from multiple fractures Neurological deficits--cognitive  Pain from multiple fractures. Patient reports that back pain can be 7/10, does not radiate down to buttocks of LE. Appears comfortable

## 2021-05-10 NOTE — PROGRESS NOTE ADULT - SUBJECTIVE AND OBJECTIVE BOX
Patient is a 65y old  Female who presents with a chief complaint of SAH and fractures of left-sided ribs, left iliac wing with hematoma, S/P closed reduction of left pinky PIP dislocation after trauma (09 May 2021 10:35)      HPI:  65F PMHx breast cancer s/p mastectomy, HTN, hypothyroidism, not on full anticoagulation/antiplatelets per EMS, presented to [facility] 21as a level 2 trauma activation after a fall from a flight of stairs. Patient only able to say "ow" in trauma bay, unable to provide further history. However per son, normal mentation at baseline. In the trauma bay, airway was intact with bilateral breath sounds, O2 saturation 100% on room air. Initially blood pressure was 140s systolic however on repeat 90s. NS@100 initiated. Secondary significant for left forehead laceration, left chest wall tenderness, left pelvic tenderness.     Ms. Arthur was hypotensive in the trauma bay and was transferred to the Surgical ICU after imaging was obtained. Her injury list is below.  (1) SAH, bilateral: stable on repeat imaging. Seven day course of Keppra for post-traumatic seizure prophylaxis.  (2) left 3-8 and right 7th rib fractures with occult left pneumothorax: pain control  (3) left iliac wing fracture with hematoma: required transfusion early in her ICU course, after which CBC were stable  (4) left forehead laceration s/p suture repair  (5) s/p successful closed reduction of left pinky PIP dislocation with delfin splint  (6) left clavicle fracture  (7) left nasal bone and left orbital rim fractures  (8) sternal fracture  Incidental findings: right renal lesion. Pt's  was notified and copy of the report given to him.     The patient was admitted to Trauma Surgery under SICU care.  Ortho was consulted for L. iliac wing fx, sternal fx, L. clavicle fx- recommended WBAT of the affected left lower extremity with walker, WBAT L shoulder, pendulum exercises okay, no lifting, sling for comfort, Encourage ROM of elbow/wrist/hand, PT/OT consult, NO urgent orthopedic surgical intervention at this time.    Neurosurgery consulted- recommended repeat CTH, which was stable.     PRS was consulted for non displaced left superior orbital rim and non displaced left nasal bone fracture--> No indication for operative fixation of these non displaced fractures; recommend ice to face to aid in swelling and analgesia.    She was placed on hemorrhage watch in the SICU given pelvic fracture and surrounding hematoma.  Behavior health was consulted for delirium.    Hand was consulted for L. pinky PIP dislocation- s/p closed reduction, rec Cont delfin taping for now.    - CTH stable  - Added home lexapro  - Added NS @ 50cc/hr to supplement poor PO intake  - Lactate cleared (1.9)    - Transfused 1u pRBC, Hct 20.9 -> 26.9  - Went into SVT, given adenosine x1 and started on metoprolol 25mg q12hr  - Added oxycodone 5mg q4hrs prn for increased pain control  - PO intake improved, IVL    - EP was consulted for SVT, recc continue lopressor 25mg   Metoprolol PO increased from 25mg q12hrs to 25mg q6hrs.   - Added salt tabs 1g q8hrs and started on NS @ 50cc/hr for hyponatremia  - Spangler discontinued, passed TOV  - Added lovenox  - Cervical collar cleared by confrontational exam     - Patient transferred from SICU to surgical floor in stable condition.  Discharge planning to acute rehab.     Stable from neurologic perspective consistent with mild TBI  Multiple rib fractures - breathing comfortable with multimodal pain control    On day of discharge, the patient was tolerating diet, working with PT well and pain controlled. The patient was medically stable for discharge to acute rehab with instructions for outpatient follow up 21. (2021 13:27)        SUBJECTIVE: Patient seen and examined. No acute overnight events. Continued to require intermittent cath for urinary retention. Complains of back pain, which improves with oxycodone. Patient is able to participate in therapy.       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    C Section    Abnormality, eye  h/o left eye vitrectomy    S/P D&amp;C (status post dilation and curettage)    H/O left mastectomy        MEDICATIONS  (STANDING):  bethanechol 10 milliGRAM(s) Oral two times a day  enoxaparin Injectable 40 milliGRAM(s) SubCutaneous every 24 hours  escitalopram 10 milliGRAM(s) Oral daily  folic acid 1 milliGRAM(s) Oral daily  levothyroxine 112 MICROGram(s) Oral daily  lidocaine   Patch 1 Patch Transdermal daily  metoprolol succinate  milliGRAM(s) Oral daily  modafinil 100 milliGRAM(s) Oral <User Schedule>  multivitamin 1 Tablet(s) Oral daily  nystatin Powder 1 Application(s) Topical two times a day  polyethylene glycol 3350 17 Gram(s) Oral daily  ramelteon 8 milliGRAM(s) Oral at bedtime  senna 2 Tablet(s) Oral at bedtime  zinc oxide 40% Ointment 1 Application(s) Topical two times a day    MEDICATIONS  (PRN):  acetaminophen   Tablet .. 650 milliGRAM(s) Oral every 6 hours PRN Temp greater or equal to 38C (100.4F), Mild Pain (1 - 3)  oxyCODONE    IR 5 milliGRAM(s) Oral every 4 hours PRN Moderate Pain (4 - 6)  oxyCODONE    IR 10 milliGRAM(s) Oral every 6 hours PRN Severe Pain (7 - 10)      Allergies    Tapazole (Hives)    Intolerances          VITALS  65y  Vital Signs Last 24 Hrs  T(C): 36.8 (10 May 2021 07:47), Max: 36.8 (09 May 2021 21:13)  T(F): 98.2 (10 May 2021 07:47), Max: 98.2 (09 May 2021 21:13)  HR: 68 (10 May 2021 07:47) (65 - 79)  BP: 110/70 (10 May 2021 07:47) (103/66 - 116/77)  BP(mean): --  RR: 15 (10 May 2021 07:47) (14 - 15)  SpO2: 96% (10 May 2021 07:47) (95% - 96%)  Daily     Daily Weight in k.8 (09 May 2021 22:12)        RECENT LABS:                          10.8   7.14  )-----------( 277      ( 10 May 2021 05:30 )             32.6     05-10    140  |  106  |  19  ----------------------------<  112<H>  4.1   |  25  |  0.56    Ca    8.5      10 May 2021 05:30    TPro  6.2  /  Alb  2.9<L>  /  TBili  0.4  /  DBili  x   /  AST  13  /  ALT  17  /  AlkPhos  423<H>  05-10    LIVER FUNCTIONS - ( 10 May 2021 05:30 )  Alb: 2.9 g/dL / Pro: 6.2 g/dL / ALK PHOS: 423 U/L / ALT: 17 U/L / AST: 13 U/L / GGT: x                                  Patient is a 65y old  Female who presents with a chief complaint of SAH and fractures of left-sided ribs, left iliac wing with hematoma, S/P closed reduction of left pinky PIP dislocation after trauma (09 May 2021 10:35)      HPI:  65F PMHx breast cancer s/p mastectomy, HTN, hypothyroidism, not on full anticoagulation/antiplatelets per EMS, presented to [facility] 21as a level 2 trauma activation after a fall from a flight of stairs. Patient only able to say "ow" in trauma bay, unable to provide further history. However per son, normal mentation at baseline. In the trauma bay, airway was intact with bilateral breath sounds, O2 saturation 100% on room air. Initially blood pressure was 140s systolic however on repeat 90s. NS@100 initiated. Secondary significant for left forehead laceration, left chest wall tenderness, left pelvic tenderness.     Ms. Arthur was hypotensive in the trauma bay and was transferred to the Surgical ICU after imaging was obtained. Her injury list is below.  (1) SAH, bilateral: stable on repeat imaging. Seven day course of Keppra for post-traumatic seizure prophylaxis.  (2) left 3-8 and right 7th rib fractures with occult left pneumothorax: pain control  (3) left iliac wing fracture with hematoma: required transfusion early in her ICU course, after which CBC were stable  (4) left forehead laceration s/p suture repair  (5) s/p successful closed reduction of left pinky PIP dislocation with delfin splint  (6) left clavicle fracture  (7) left nasal bone and left orbital rim fractures  (8) sternal fracture  Incidental findings: right renal lesion. Pt's  was notified and copy of the report given to him.     The patient was admitted to Trauma Surgery under SICU care.  Ortho was consulted for L. iliac wing fx, sternal fx, L. clavicle fx- recommended WBAT of the affected left lower extremity with walker, WBAT L shoulder, pendulum exercises okay, no lifting, sling for comfort, Encourage ROM of elbow/wrist/hand, PT/OT consult, NO urgent orthopedic surgical intervention at this time.    Neurosurgery consulted- recommended repeat CTH, which was stable.     PRS was consulted for non displaced left superior orbital rim and non displaced left nasal bone fracture--> No indication for operative fixation of these non displaced fractures; recommend ice to face to aid in swelling and analgesia.    She was placed on hemorrhage watch in the SICU given pelvic fracture and surrounding hematoma.  Behavior health was consulted for delirium.    Hand was consulted for L. pinky PIP dislocation- s/p closed reduction, rec Cont delfin taping for now.    - CTH stable  - Added home lexapro  - Added NS @ 50cc/hr to supplement poor PO intake  - Lactate cleared (1.9)    - Transfused 1u pRBC, Hct 20.9 -> 26.9  - Went into SVT, given adenosine x1 and started on metoprolol 25mg q12hr  - Added oxycodone 5mg q4hrs prn for increased pain control  - PO intake improved, IVL    - EP was consulted for SVT, recc continue lopressor 25mg   Metoprolol PO increased from 25mg q12hrs to 25mg q6hrs.   - Added salt tabs 1g q8hrs and started on NS @ 50cc/hr for hyponatremia  - Spangler discontinued, passed TOV  - Added lovenox  - Cervical collar cleared by confrontational exam     - Patient transferred from SICU to surgical floor in stable condition.  Discharge planning to acute rehab.     Stable from neurologic perspective consistent with mild TBI  Multiple rib fractures - breathing comfortable with multimodal pain control    On day of discharge, the patient was tolerating diet, working with PT well and pain controlled. The patient was medically stable for discharge to acute rehab with instructions for outpatient follow up 21. (2021 13:27)        SUBJECTIVE: Patient seen and examined. No acute overnight events. Continued to require intermittent cath for urinary retention. Complains of back pain, which improves with oxycodone. Patient is able to participate in therapy.       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    C Section    Abnormality, eye  h/o left eye vitrectomy    S/P D&amp;C (status post dilation and curettage)    H/O left mastectomy        MEDICATIONS  (STANDING):  bethanechol 10 milliGRAM(s) Oral two times a day  enoxaparin Injectable 40 milliGRAM(s) SubCutaneous every 24 hours  escitalopram 10 milliGRAM(s) Oral daily  folic acid 1 milliGRAM(s) Oral daily  levothyroxine 112 MICROGram(s) Oral daily  lidocaine   Patch 1 Patch Transdermal daily  metoprolol succinate  milliGRAM(s) Oral daily  modafinil 100 milliGRAM(s) Oral <User Schedule>  multivitamin 1 Tablet(s) Oral daily  nystatin Powder 1 Application(s) Topical two times a day  polyethylene glycol 3350 17 Gram(s) Oral daily  ramelteon 8 milliGRAM(s) Oral at bedtime  senna 2 Tablet(s) Oral at bedtime  zinc oxide 40% Ointment 1 Application(s) Topical two times a day    MEDICATIONS  (PRN):  acetaminophen   Tablet .. 650 milliGRAM(s) Oral every 6 hours PRN Temp greater or equal to 38C (100.4F), Mild Pain (1 - 3)  oxyCODONE    IR 5 milliGRAM(s) Oral every 4 hours PRN Moderate Pain (4 - 6)  oxyCODONE    IR 10 milliGRAM(s) Oral every 6 hours PRN Severe Pain (7 - 10)      Allergies    Tapazole (Hives)    Intolerances          VITALS  65y  Vital Signs Last 24 Hrs  T(C): 36.8 (10 May 2021 07:47), Max: 36.8 (09 May 2021 21:13)  T(F): 98.2 (10 May 2021 07:47), Max: 98.2 (09 May 2021 21:13)  HR: 68 (10 May 2021 07:47) (65 - 79)  BP: 110/70 (10 May 2021 07:47) (103/66 - 116/77)  BP(mean): --  RR: 15 (10 May 2021 07:47) (14 - 15)  SpO2: 96% (10 May 2021 07:47) (95% - 96%)  Daily     Daily Weight in k.8 (09 May 2021 22:12)        RECENT LABS:                          10.8   7.14  )-----------( 277      ( 10 May 2021 05:30 )             32.6     05-10    140  |  106  |  19  ----------------------------<  112<H>  4.1   |  25  |  0.56    Ca    8.5      10 May 2021 05:30    TPro  6.2  /  Alb  2.9<L>  /  TBili  0.4  /  DBili  x   /  AST  13  /  ALT  17  /  AlkPhos  423<H>  05-10    LIVER FUNCTIONS - ( 10 May 2021 05:30 )  Alb: 2.9 g/dL / Pro: 6.2 g/dL / ALK PHOS: 423 U/L / ALT: 17 U/L / AST: 13 U/L / GGT: x

## 2021-05-10 NOTE — PROGRESS NOTE ADULT - MOTOR
LEFT    UE - Limited due to left clavicle fracture. Able to wiggle fingers and extend wrist and bend elbow about 3-4/5.                     RIGHT UE - ShAB 5/5, EF 5/5, EE 5/5, WE 5/5,  5/5                    LEFT    LE - HF 4/5-, KE 5/5, DF 5/5, PF 5/5                    RIGHT LE - HF 5/5, KE 5/5, DF 5/5, PF 5/5

## 2021-05-11 PROCEDURE — 99233 SBSQ HOSP IP/OBS HIGH 50: CPT

## 2021-05-11 PROCEDURE — 99232 SBSQ HOSP IP/OBS MODERATE 35: CPT

## 2021-05-11 RX ADMIN — LIDOCAINE 1 PATCH: 4 CREAM TOPICAL at 06:28

## 2021-05-11 RX ADMIN — Medication 10 MILLIGRAM(S): at 17:14

## 2021-05-11 RX ADMIN — LIDOCAINE 1 PATCH: 4 CREAM TOPICAL at 12:02

## 2021-05-11 RX ADMIN — ZINC OXIDE 1 APPLICATION(S): 200 OINTMENT TOPICAL at 17:17

## 2021-05-11 RX ADMIN — Medication 8 MILLIGRAM(S): at 20:52

## 2021-05-11 RX ADMIN — NYSTATIN CREAM 1 APPLICATION(S): 100000 CREAM TOPICAL at 17:13

## 2021-05-11 RX ADMIN — ENOXAPARIN SODIUM 40 MILLIGRAM(S): 100 INJECTION SUBCUTANEOUS at 06:26

## 2021-05-11 RX ADMIN — ZINC OXIDE 1 APPLICATION(S): 200 OINTMENT TOPICAL at 06:28

## 2021-05-11 RX ADMIN — LIDOCAINE 1 PATCH: 4 CREAM TOPICAL at 20:44

## 2021-05-11 RX ADMIN — Medication 112 MICROGRAM(S): at 06:27

## 2021-05-11 RX ADMIN — Medication 10 MILLIGRAM(S): at 06:27

## 2021-05-11 RX ADMIN — Medication 100 MILLIGRAM(S): at 06:27

## 2021-05-11 RX ADMIN — POLYETHYLENE GLYCOL 3350 17 GRAM(S): 17 POWDER, FOR SOLUTION ORAL at 12:03

## 2021-05-11 RX ADMIN — NYSTATIN CREAM 1 APPLICATION(S): 100000 CREAM TOPICAL at 06:27

## 2021-05-11 RX ADMIN — OXYCODONE HYDROCHLORIDE 10 MILLIGRAM(S): 5 TABLET ORAL at 08:17

## 2021-05-11 RX ADMIN — ESCITALOPRAM OXALATE 10 MILLIGRAM(S): 10 TABLET, FILM COATED ORAL at 12:03

## 2021-05-11 RX ADMIN — Medication 1 MILLIGRAM(S): at 12:03

## 2021-05-11 RX ADMIN — SENNA PLUS 2 TABLET(S): 8.6 TABLET ORAL at 20:52

## 2021-05-11 RX ADMIN — OXYCODONE HYDROCHLORIDE 10 MILLIGRAM(S): 5 TABLET ORAL at 08:49

## 2021-05-11 RX ADMIN — MODAFINIL 100 MILLIGRAM(S): 200 TABLET ORAL at 06:27

## 2021-05-11 RX ADMIN — Medication 1 TABLET(S): at 12:03

## 2021-05-11 NOTE — PROGRESS NOTE ADULT - ASSESSMENT
64 yo woman with PMHx breast cancer s/p mastectomy, HTN, Hypothyroidism admitted to [facility] after hospital course at [facility] after a fall from a flight of stairs, sustaining SAH, left-sided rib fractures, left iliac wing fracture with hematoma, left forehead laceration, s/p successful closed reduction of left pinky PIP dislocation, Acute L3 compression fracture, 4/29, admitted for rehab- pt/ot/dvt ppx pain meds    s/p fall SAH left sided rib and left iliac wing fx, L3fx  - continue comprehensive rehabilitation program per PMR  - pain meds per PMR team    Urinary retention  Constipation  - straight cath/ PVR per protocol  - Suggest Urology consult  - Continue bowel regimen    Normocytic Anemia with folic acid deficiency - stable  - monitor H/H    Elevated ALKPHOS  - probably 2/2 multiple fractures   - avoid hepatotoxins  - trend LFTs  - encourage hydration    Episodes of SVT   - Continue with toprol XL   - Can follow up in 3 months with EP Dr. Hernández [phone]. (elective ablation)    Hypothyroidism  - Continue Synthroid     Acute adjustment disorder with depressed mood    -  continue modafinil for cognitive stimulation    Insomnia  - melatonin     Acute Hypoxic Respiratory Failure likely due to Acute B/L rib fractures, and small pneumothorax  - resolved  - Incentive spirometry.   - Supplemental Oxygen as needed for O2 goal > 92%    VTE PPX   - Lovenox

## 2021-05-11 NOTE — CHART NOTE - NSCHARTNOTEFT_GEN_A_CORE
NUTRITION FOLLOW UP    SOURCE: Patient ( )   Family [ ]    Medical Record (X), pt confused    DIET:  Diet, Regular:   Kosher  Supplement Feeding Modality:  Oral  Ensure Enlive Cans or Servings Per Day:  1       Frequency:  Two Times a day (04-28-21 @ 14:04) [Active]      Pt continues on regular, kosher diet order. PO intakes % per RN documentation. Spoke w/ pt's nurse today, pt consumed 100% of lunch this afternoon. Pt has difficulty feeding herself due to cervical collar, facial injuries and requires assistance at meals. Recommend to continue ensure enlive BID. No N/V/D/C per chart review. Last BM 5/9. Bowel regimen-- miralax and senna. Weights appear stable. Will continue to monitor/follow as indicated.     CURRENT WEIGHT: 131.8# (5/9), 135.1# (4/30), 133.1# (4/28)    PERTINENT MEDS:   Pertinent Medications: MEDICATIONS  (STANDING):  bethanechol 10 milliGRAM(s) Oral two times a day  enoxaparin Injectable 40 milliGRAM(s) SubCutaneous every 24 hours  escitalopram 10 milliGRAM(s) Oral daily  folic acid 1 milliGRAM(s) Oral daily  levothyroxine 112 MICROGram(s) Oral daily  lidocaine   Patch 1 Patch Transdermal daily  metoprolol succinate  milliGRAM(s) Oral daily  modafinil 100 milliGRAM(s) Oral <User Schedule>  multivitamin 1 Tablet(s) Oral daily  nystatin Powder 1 Application(s) Topical two times a day  oxyCODONE    IR 10 milliGRAM(s) Oral <User Schedule>  polyethylene glycol 3350 17 Gram(s) Oral daily  ramelteon 8 milliGRAM(s) Oral at bedtime  senna 2 Tablet(s) Oral at bedtime  zinc oxide 40% Ointment 1 Application(s) Topical two times a day    MEDICATIONS  (PRN):  acetaminophen   Tablet .. 650 milliGRAM(s) Oral every 6 hours PRN Temp greater or equal to 38C (100.4F), Mild Pain (1 - 3)      PERTINENT LABS:  05-10 Na140 mmol/L Glu 112 mg/dL<H> K+ 4.1 mmol/L Cr  0.56 mg/dL BUN 19 mg/dL 05-10 Alb 2.9 g/dL<L>  A1c: 5.7% (4/21)      SKIN:  ecchymotic    EDEMA: none noted    LAST BM: 5/9, fecal incontinence noted    ESTIMATED NEEDS:   [X] no change since previous assessment  [ ] recalculated:     PREVIOUS NUTRITION DIAGNOSIS: Increased nutrient needs-- pt s/p fall with multiple injuries    NUTRITION DIAGNOSIS is :  ( X )  Ongoing      (   ) Resolved,  RD will follow as per nutrition department protocol.    NEW NUTRITION DIAGNOSIS: N/A    NUTRITION RECOMMENDATIONS:   1. Continue current diet order. Hammond pt's preferences as able, pt follows kosher diet.   2. Continue Ensure Enlive BID.   3. Feeding assistance at meals.     MONITORING AND EVALUATION:   1. Tolerance to diet prescription   2. PO intake  3. Weights  4. Labs  5. Follow Up per protocol     RD to remain available   JENNI Burton Post Dietetic Intern

## 2021-05-11 NOTE — PROGRESS NOTE ADULT - SUBJECTIVE AND OBJECTIVE BOX
Patient is a 65y old  Female who presents with a chief complaint of SAH and fractures of left-sided ribs, left iliac wing with hematoma, S/P closed reduction of left pinky PIP dislocation after trauma (09 May 2021 10:35)      HPI:  65F PMHx breast cancer s/p mastectomy, HTN, hypothyroidism, not on full anticoagulation/antiplatelets per EMS, presented to [facility] 4/19/21as a level 2 trauma activation after a fall from a flight of stairs. Patient only able to say "ow" in trauma bay, unable to provide further history. However per son, normal mentation at baseline. In the trauma bay, airway was intact with bilateral breath sounds, O2 saturation 100% on room air. Initially blood pressure was 140s systolic however on repeat 90s. NS@100 initiated. Secondary significant for left forehead laceration, left chest wall tenderness, left pelvic tenderness.     Ms. Arthur was hypotensive in the trauma bay and was transferred to the Surgical ICU after imaging was obtained. Her injury list is below.  (1) SAH, bilateral: stable on repeat imaging. Seven day course of Keppra for post-traumatic seizure prophylaxis.  (2) left 3-8 and right 7th rib fractures with occult left pneumothorax: pain control  (3) left iliac wing fracture with hematoma: required transfusion early in her ICU course, after which CBC were stable  (4) left forehead laceration s/p suture repair  (5) s/p successful closed reduction of left pinky PIP dislocation with delfin splint  (6) left clavicle fracture  (7) left nasal bone and left orbital rim fractures  (8) sternal fracture  Incidental findings: right renal lesion. Pt's  was notified and copy of the report given to him.     The patient was admitted to Trauma Surgery under SICU care.  Ortho was consulted for L. iliac wing fx, sternal fx, L. clavicle fx- recommended WBAT of the affected left lower extremity with walker, WBAT L shoulder, pendulum exercises okay, no lifting, sling for comfort, Encourage ROM of elbow/wrist/hand, PT/OT consult, NO urgent orthopedic surgical intervention at this time.    Neurosurgery consulted- recommended repeat CTH, which was stable.     PRS was consulted for non displaced left superior orbital rim and non displaced left nasal bone fracture--> No indication for operative fixation of these non displaced fractures; recommend ice to face to aid in swelling and analgesia.    She was placed on hemorrhage watch in the SICU given pelvic fracture and surrounding hematoma.  Behavior health was consulted for delirium.    Hand was consulted for L. pinky PIP dislocation- s/p closed reduction, rec Cont delfin taping for now.    4/21- CTH stable  - Added home lexapro  - Added NS @ 50cc/hr to supplement poor PO intake  - Lactate cleared (1.9)    4/22- Transfused 1u pRBC, Hct 20.9 -> 26.9  - Went into SVT, given adenosine x1 and started on metoprolol 25mg q12hr  - Added oxycodone 5mg q4hrs prn for increased pain control  - PO intake improved, IVL    4/23- EP was consulted for SVT, recc continue lopressor 25mg   Metoprolol PO increased from 25mg q12hrs to 25mg q6hrs.   - Added salt tabs 1g q8hrs and started on NS @ 50cc/hr for hyponatremia  - Spangler discontinued, passed TOV  - Added lovenox  - Cervical collar cleared by confrontational exam     4/24- Patient transferred from SICU to surgical floor in stable condition.  Discharge planning to acute rehab.     Stable from neurologic perspective consistent with mild TBI  Multiple rib fractures - breathing comfortable with multimodal pain control    On day of discharge, the patient was tolerating diet, working with PT well and pain controlled. The patient was medically stable for discharge to acute rehab with instructions for outpatient follow up 4/27/21. 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    C Section    Abnormality, eye  h/o left eye vitrectomy    S/P D&amp;C (status post dilation and curettage)    H/O left mastectomy        MEDICATIONS  (STANDING):  bethanechol 10 milliGRAM(s) Oral two times a day  enoxaparin Injectable 40 milliGRAM(s) SubCutaneous every 24 hours  escitalopram 10 milliGRAM(s) Oral daily  folic acid 1 milliGRAM(s) Oral daily  levothyroxine 112 MICROGram(s) Oral daily  lidocaine   Patch 1 Patch Transdermal daily  metoprolol succinate  milliGRAM(s) Oral daily  modafinil 100 milliGRAM(s) Oral <User Schedule>  multivitamin 1 Tablet(s) Oral daily  nystatin Powder 1 Application(s) Topical two times a day  polyethylene glycol 3350 17 Gram(s) Oral daily  ramelteon 8 milliGRAM(s) Oral at bedtime  senna 2 Tablet(s) Oral at bedtime  zinc oxide 40% Ointment 1 Application(s) Topical two times a day    MEDICATIONS  (PRN):  acetaminophen   Tablet .. 650 milliGRAM(s) Oral every 6 hours PRN Temp greater or equal to 38C (100.4F), Mild Pain (1 - 3)  oxyCODONE    IR 5 milliGRAM(s) Oral every 4 hours PRN Moderate Pain (4 - 6)  oxyCODONE    IR 10 milliGRAM(s) Oral every 6 hours PRN Severe Pain (7 - 10)      Allergies    Tapazole (Hives)    Intolerances          Vital Signs Last 24 Hrs  T(C): 36.9 (11 May 2021 08:05), Max: 36.9 (11 May 2021 08:05)  T(F): 98.4 (11 May 2021 08:05), Max: 98.4 (11 May 2021 08:05)  HR: 61 (11 May 2021 08:05) (61 - 80)  BP: 112/73 (11 May 2021 08:05) (111/69 - 131/82)  BP(mean): --  RR: 14 (11 May 2021 08:05) (14 - 14)  SpO2: 96% (11 May 2021 08:05) (96% - 97%)      Review of Systems:   · Additional ROS	Neurological deficits--cognitive  No overnight events. Patient received Oxycodone this AM. Currently not in pain. Denies discomfort. Appears comfortable    Physical Exam:   · Constitutional	detailed exam  · Constitutional Details	no distress  · Constitutional Comments	Patient alert, reduced sustained attention and vocal volume  · Eyes	detailed exam  · Eyes Comments	periorbital ecchymoses-improving, EOMI, MMM. no TTP, no scalp swelling  · Respiratory	detailed exam  · Respiratory Details	breath sounds equal; respirations non-labored; clear to auscultation bilaterally; no wheezes  · Cardiovascular	detailed exam  · Cardiovascular Details	regular rate and rhythm  · Gastrointestinal	detailed exam  · GI Normal	nontender; no distention; bowel sounds normal  · Genitourinary	detailed exam  · Genitourinary Comments	+urinary retention  · Neurological	detailed exam  · Mental Status	Cognitive - AAOx3     Communication - Fluent, delayed. Able to repeat.      Attention: Impaired. Requires frequent repetition and cueing.      Memory: Recall 3/3 objects immediate and 0/3 5 min later  · Cranial Nerve	Cranial Nerves - CN 2-12 intact  · Motor	LEFT    UE - Limited due to left clavicle fracture. Able to wiggle fingers and extend wrist and bend elbow about 3-4/5. Delfin tape on L 5th finger removed.                    RIGHT UE - ShAB 5/5, EF 5/5, EE 5/5, WE 5/5,  5/5                    LEFT    LE - HF 4/5-, KE 5/5, DF 5/5, PF 5/5                    RIGHT LE - HF 5/5, KE 5/5, DF 5/5, PF 5/5        RECENT LABS:                          10.8   7.14  )-----------( 277      ( 10 May 2021 05:30 )             32.6     05-10    140  |  106  |  19  ----------------------------<  112<H>  4.1   |  25  |  0.56    Ca    8.5      10 May 2021 05:30    TPro  6.2  /  Alb  2.9<L>  /  TBili  0.4  /  DBili  x   /  AST  13  /  ALT  17  /  AlkPhos  423<H>  05-10    LIVER FUNCTIONS - ( 10 May 2021 05:30 )  Alb: 2.9 g/dL / Pro: 6.2 g/dL / ALK PHOS: 423 U/L / ALT: 17 U/L / AST: 13 U/L / GGT: x               MEDICATIONS  (STANDING):  bethanechol 10 milliGRAM(s) Oral two times a day  enoxaparin Injectable 40 milliGRAM(s) SubCutaneous every 24 hours  escitalopram 10 milliGRAM(s) Oral daily  folic acid 1 milliGRAM(s) Oral daily  levothyroxine 112 MICROGram(s) Oral daily  lidocaine   Patch 1 Patch Transdermal daily  metoprolol succinate  milliGRAM(s) Oral daily  modafinil 100 milliGRAM(s) Oral <User Schedule>  multivitamin 1 Tablet(s) Oral daily  nystatin Powder 1 Application(s) Topical two times a day  oxyCODONE    IR 10 milliGRAM(s) Oral <User Schedule>  polyethylene glycol 3350 17 Gram(s) Oral daily  ramelteon 8 milliGRAM(s) Oral at bedtime  senna 2 Tablet(s) Oral at bedtime  zinc oxide 40% Ointment 1 Application(s) Topical two times a day    MEDICATIONS  (PRN):  acetaminophen   Tablet .. 650 milliGRAM(s) Oral every 6 hours PRN Temp greater or equal to 38C (100.4F), Mild Pain (1 - 3)                     Patient is a 65y old  Female who presents with a chief complaint of SAH and fractures of left-sided ribs, left iliac wing with hematoma, S/P closed reduction of left pinky PIP dislocation after trauma (09 May 2021 10:35)      HPI:  65F PMHx breast cancer s/p mastectomy, HTN, hypothyroidism, not on full anticoagulation/antiplatelets per EMS, presented to [facility] 4/19/21as a level 2 trauma activation after a fall from a flight of stairs. Patient only able to say "ow" in trauma bay, unable to provide further history. However per son, normal mentation at baseline. In the trauma bay, airway was intact with bilateral breath sounds, O2 saturation 100% on room air. Initially blood pressure was 140s systolic however on repeat 90s. NS@100 initiated. Secondary significant for left forehead laceration, left chest wall tenderness, left pelvic tenderness.     Ms. Arthur was hypotensive in the trauma bay and was transferred to the Surgical ICU after imaging was obtained. Her injury list is below.  (1) SAH, bilateral: stable on repeat imaging. Seven day course of Keppra for post-traumatic seizure prophylaxis.  (2) left 3-8 and right 7th rib fractures with occult left pneumothorax: pain control  (3) left iliac wing fracture with hematoma: required transfusion early in her ICU course, after which CBC were stable  (4) left forehead laceration s/p suture repair  (5) s/p successful closed reduction of left pinky PIP dislocation with delfin splint  (6) left clavicle fracture  (7) left nasal bone and left orbital rim fractures  (8) sternal fracture  Incidental findings: right renal lesion. Pt's  was notified and copy of the report given to him.     The patient was admitted to Trauma Surgery under SICU care.  Ortho was consulted for L. iliac wing fx, sternal fx, L. clavicle fx- recommended WBAT of the affected left lower extremity with walker, WBAT L shoulder, pendulum exercises okay, no lifting, sling for comfort, Encourage ROM of elbow/wrist/hand, PT/OT consult, NO urgent orthopedic surgical intervention at this time.    Neurosurgery consulted- recommended repeat CTH, which was stable.     PRS was consulted for non displaced left superior orbital rim and non displaced left nasal bone fracture--> No indication for operative fixation of these non displaced fractures; recommend ice to face to aid in swelling and analgesia.    She was placed on hemorrhage watch in the SICU given pelvic fracture and surrounding hematoma.  Behavior health was consulted for delirium.    Hand was consulted for L. pinky PIP dislocation- s/p closed reduction, rec Cont delfin taping for now.    4/21- CTH stable  - Added home lexapro  - Added NS @ 50cc/hr to supplement poor PO intake  - Lactate cleared (1.9)    4/22- Transfused 1u pRBC, Hct 20.9 -> 26.9  - Went into SVT, given adenosine x1 and started on metoprolol 25mg q12hr  - Added oxycodone 5mg q4hrs prn for increased pain control  - PO intake improved, IVL    4/23- EP was consulted for SVT, recc continue lopressor 25mg   Metoprolol PO increased from 25mg q12hrs to 25mg q6hrs.   - Added salt tabs 1g q8hrs and started on NS @ 50cc/hr for hyponatremia  - Spangler discontinued, passed TOV  - Added lovenox  - Cervical collar cleared by confrontational exam     4/24- Patient transferred from SICU to surgical floor in stable condition.  Discharge planning to acute rehab.     Stable from neurologic perspective consistent with mild TBI  Multiple rib fractures - breathing comfortable with multimodal pain control    On day of discharge, the patient was tolerating diet, working with PT well and pain controlled. The patient was medically stable for discharge to acute rehab with instructions for outpatient follow up 4/27/21. 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    C Section    Abnormality, eye  h/o left eye vitrectomy    S/P D&amp;C (status post dilation and curettage)    H/O left mastectomy        MEDICATIONS  (STANDING):  bethanechol 10 milliGRAM(s) Oral two times a day  enoxaparin Injectable 40 milliGRAM(s) SubCutaneous every 24 hours  escitalopram 10 milliGRAM(s) Oral daily  folic acid 1 milliGRAM(s) Oral daily  levothyroxine 112 MICROGram(s) Oral daily  lidocaine   Patch 1 Patch Transdermal daily  metoprolol succinate  milliGRAM(s) Oral daily  modafinil 100 milliGRAM(s) Oral <User Schedule>  multivitamin 1 Tablet(s) Oral daily  nystatin Powder 1 Application(s) Topical two times a day  polyethylene glycol 3350 17 Gram(s) Oral daily  ramelteon 8 milliGRAM(s) Oral at bedtime  senna 2 Tablet(s) Oral at bedtime  zinc oxide 40% Ointment 1 Application(s) Topical two times a day    MEDICATIONS  (PRN):  acetaminophen   Tablet .. 650 milliGRAM(s) Oral every 6 hours PRN Temp greater or equal to 38C (100.4F), Mild Pain (1 - 3)  oxyCODONE    IR 5 milliGRAM(s) Oral every 4 hours PRN Moderate Pain (4 - 6)  oxyCODONE    IR 10 milliGRAM(s) Oral every 6 hours PRN Severe Pain (7 - 10)      Allergies    Tapazole (Hives)    Intolerances          Vital Signs Last 24 Hrs  T(C): 36.9 (11 May 2021 08:05), Max: 36.9 (11 May 2021 08:05)  T(F): 98.4 (11 May 2021 08:05), Max: 98.4 (11 May 2021 08:05)  HR: 61 (11 May 2021 08:05) (61 - 80)  BP: 112/73 (11 May 2021 08:05) (111/69 - 131/82)  BP(mean): --  RR: 14 (11 May 2021 08:05) (14 - 14)  SpO2: 96% (11 May 2021 08:05) (96% - 97%)      Review of Systems:   · Additional ROS	 Patient received Oxycodone this AM. Currently not in pain. Denies discomfort. Appears comfortable sitting in chair, no acute distress.        Physical Exam:   · Constitutional	detailed exam  · Constitutional Details	no distress  · Constitutional Comments	Patient eyes open spontaneously, reduced initaition and attention  	  	  · Respiratory	detailed exam  · Respiratory Details	breath sounds equal; respirations non-labored; clear to auscultation bilaterally; no wheezes  · Cardiovascular	detailed exam  · Cardiovascular Details	regular rate and rhythm  · Gastrointestinal	detailed exam  · GI Normal	nontender; no distention; bowel sounds normal  	  	  	  	  	  · Motor	LEFT    UE - Limited due to left clavicle fracture. Able to wiggle fingers and extend wrist and bend elbow about 3-4/5. Delfin tape on L 5th finger removed.  + left UE in sling, no hand swelling                       LEFT    LE - HF 4/5-, KE 5/5, DF 5/5, PF 5/5                  RECENT LABS:                          10.8   7.14  )-----------( 277      ( 10 May 2021 05:30 )             32.6     05-10    140  |  106  |  19  ----------------------------<  112<H>  4.1   |  25  |  0.56    Ca    8.5      10 May 2021 05:30    TPro  6.2  /  Alb  2.9<L>  /  TBili  0.4  /  DBili  x   /  AST  13  /  ALT  17  /  AlkPhos  423<H>  05-10    LIVER FUNCTIONS - ( 10 May 2021 05:30 )  Alb: 2.9 g/dL / Pro: 6.2 g/dL / ALK PHOS: 423 U/L / ALT: 17 U/L / AST: 13 U/L / GGT: x               MEDICATIONS  (STANDING):  bethanechol 10 milliGRAM(s) Oral two times a day  enoxaparin Injectable 40 milliGRAM(s) SubCutaneous every 24 hours  escitalopram 10 milliGRAM(s) Oral daily  folic acid 1 milliGRAM(s) Oral daily  levothyroxine 112 MICROGram(s) Oral daily  lidocaine   Patch 1 Patch Transdermal daily  metoprolol succinate  milliGRAM(s) Oral daily  modafinil 100 milliGRAM(s) Oral <User Schedule>  multivitamin 1 Tablet(s) Oral daily  nystatin Powder 1 Application(s) Topical two times a day  oxyCODONE    IR 10 milliGRAM(s) Oral <User Schedule>  polyethylene glycol 3350 17 Gram(s) Oral daily  ramelteon 8 milliGRAM(s) Oral at bedtime  senna 2 Tablet(s) Oral at bedtime  zinc oxide 40% Ointment 1 Application(s) Topical two times a day    MEDICATIONS  (PRN):  acetaminophen   Tablet .. 650 milliGRAM(s) Oral every 6 hours PRN Temp greater or equal to 38C (100.4F), Mild Pain (1 - 3)

## 2021-05-11 NOTE — PROGRESS NOTE ADULT - SUBJECTIVE AND OBJECTIVE BOX
Patient is a 65y old  Female who presents with a chief complaint of SAH and fractures of left-sided ribs, left iliac wing with hematoma, S/P closed reduction of left pinky PIP dislocation after trauma (10 May 2021 11:39)      Patient seen and examined at bedside.  No overnight events  No complaints this morning    ALLERGIES:  Tapazole (Hives)    MEDICATIONS  (STANDING):  bethanechol 10 milliGRAM(s) Oral two times a day  enoxaparin Injectable 40 milliGRAM(s) SubCutaneous every 24 hours  escitalopram 10 milliGRAM(s) Oral daily  folic acid 1 milliGRAM(s) Oral daily  levothyroxine 112 MICROGram(s) Oral daily  lidocaine   Patch 1 Patch Transdermal daily  metoprolol succinate  milliGRAM(s) Oral daily  modafinil 100 milliGRAM(s) Oral <User Schedule>  multivitamin 1 Tablet(s) Oral daily  nystatin Powder 1 Application(s) Topical two times a day  oxyCODONE    IR 10 milliGRAM(s) Oral <User Schedule>  polyethylene glycol 3350 17 Gram(s) Oral daily  ramelteon 8 milliGRAM(s) Oral at bedtime  senna 2 Tablet(s) Oral at bedtime  zinc oxide 40% Ointment 1 Application(s) Topical two times a day    MEDICATIONS  (PRN):  acetaminophen   Tablet .. 650 milliGRAM(s) Oral every 6 hours PRN Temp greater or equal to 38C (100.4F), Mild Pain (1 - 3)    Vital Signs Last 24 Hrs  T(F): 98.4 (11 May 2021 08:05), Max: 98.4 (11 May 2021 08:05)  HR: 61 (11 May 2021 08:05) (61 - 80)  BP: 112/73 (11 May 2021 08:05) (111/69 - 131/82)  RR: 14 (11 May 2021 08:05) (14 - 14)  SpO2: 96% (11 May 2021 08:05) (96% - 97%)  I&O's Summary    10 May 2021 07:01  -  11 May 2021 07:00  --------------------------------------------------------  IN: 660 mL / OUT: 2020 mL / NET: -1360 mL      PHYSICAL EXAM:  GENERAL: NAD  HENT:  Atraumatic, Normocephalic; No tonsillar erythema, exudates, or enlargement; Moist mucous membranes;   EYES: EOMI, PERRLA, conjunctiva and sclera clear, no lid-lag  NECK: Supple, No JVD, Normal thyroid  CHEST/LUNG: Clear to percussion bilaterally; No rales, rhonchi, wheezing, or rubs; normal respiratory effort, no intercostal retractions  HEART: Regular rate and rhythm; No murmurs, rubs, or gallops  ABDOMEN: Soft, Nontender, Nondistended; Bowel sounds present; No HSM  MUSCULOSKELETAL/EXTREMITIES:  2+ Peripheral Pulses, No clubbing, cyanosis, or peripheral edema; No digital cyanosis  PSYCH: Appropriate affect, Alert & Awake    LABS:                        10.8   7.14  )-----------( 277      ( 10 May 2021 05:30 )             32.6       05-10    140  |  106  |  19  ----------------------------<  112  4.1   |  25  |  0.56    Ca    8.5      10 May 2021 05:30    TPro  6.2  /  Alb  2.9  /  TBili  0.4  /  DBili  x   /  AST  13  /  ALT  17  /  AlkPhos  423  05-10     eGFR if Non African American: 96 mL/min/1.73M2 (05-10-21 @ 05:30)  eGFR if : 112 mL/min/1.73M2 (05-10-21 @ 05:30)    Care Discussed with Consultants/Other Providers: Yes

## 2021-05-11 NOTE — PROGRESS NOTE ADULT - ASSESSMENT
JONATHAN CHILD is a 65y with PMHx breast Ca S/P mastectomy, HTN, hypothyroidism who presented to [facility] 4/19/21 s/p fall down a flight of stairs with bilateral frontoparietal SAH; L nasal bone and L orbital rim fractures; sternal fracture; L 3-8, R 7th rib fractures;  L pneumothorax, L iliac wing fracture with hematoma S/P 1U pRBCs; left clavicle fx, left V finger PIP dislocation s/p CR. She is WBAT Left LE, NWB Left UE.     #SAH  - Avoid NSAIDs  - monitor neurological status  - F/u neurosurgery, Dr. Vikas Aquino, upon discharge  - Precautions: falls, sternal , WB as above, AMS, cardiac, h/o breast CA    # Multiple fractures  - Cont comprehensive rehab program, PT/OT/SLP 3 hours a day, 5 days a week.  - WBAT LLE, NWB LUE   - cleared for ROM exercises for L elbow, wrist, hand.  L shoulder pendulum exercises. Sling to LUE for comfort. No lifting more than 1-3 pounds for 6 weeks.  - LSO brace whenever OOB  - Continue Lovenox for 6 weeks total (4/22-6/3)  - Tylenol, Lidoderm to L chest wall. Scheduled Oxycodone 10mg at 8am before therapy to help improve pain control  - plastic surgeon Dr. Nell Choe, for facial laceration, L pinky dislocation, L nasal bone and orbital rim fractures. Ok to remove buddy tape for L 5th finger dislocation per Dr. Choe 5/10  - ortho, Dr. Reji Cardoza, for L clavicle and L pelvic wing fractures  - surgery, Dr. Dino Peña, for rib fractures    # Acute Hypoxic Respiratory Failure likely due to Acute B/L rib fractures, and small pneumothorax  - Incentive spirometry.   - Supplemental Oxygen as needed for O2 goal > 92%    # Elevated Alk Phos  - Hepatic sono 5/7: Increased echogenicity of the hepatic parenchyma suggests hepatic steatosis. No focal hepatic masses identified. Multiple gallstones, without evidence for gallbladder wall thickening or pericholecystic fluid.  -  likely 2/2 trauma, bone fractures  - 5/7 GGT 37, alk phos 431 --> 5/10 Alk Phos 423. improving    # H/o SVT  - Continue with toprol XL   - Can follow up in 3 months with EP Dr. Hernández [phone]. (elective ablation)    #R kidney lesion  - F/u Dr. Herrera outpatient or PCP    #Hypothyroidism  - Continue Synthroid 112mcg daily    # Depression  - Per , premorbid signs of depression  - Psych consult reviewed and appreciated  - continue Lexapro 10mg daily  - Continue Modafinil 100mg daily. Tolerating    # Sleep  - cont Rozerem 8mg    # GI/Bowel:  - At risk for constipation due to neurologic diagnosis, immobility and/or medication use  - Senna QHS, Miralax Daily    # /Bladder:   - Spangler placed 4/29/21 for urinary retention. TOV 5/6   - Increased bethanachol to 10mg BID 5/6  - 5/10, still retaining requiring -700 ml  -  consult 5/10 pending    # DVT ppx:  - Lovenox  - SCDs    # Case discussed in IDT 5/6/21:  - lives with son and spouse, has HHA for iADLs but able to do ADLs independently. Owns cane and RW.  - Mark eating, Mod A grooming, dressing and toileting and shower transfer, min-mod A transfer. Mod A 20 ft WBQC, no stairs. Limited by poor carryover of spinal precautions. Severe cognitive deficits, decreased attention and memory, decreased insight and increased processing speed.  - Goal: supervision grooming, bathing LBD, Barb UBD and toilet transfers, Mark shower transfers, Mark transfers and ambulation  - EDOD: 5/19 to home will need 24 h supervision  - caregiver training    #LABS  lovenox education   consult  [facility] 5/13    ---------------  Outpatient Follow-up (Specialty/Name of physician):  Silvina Herrera  INTERNAL MEDICINE  [address] [place] [address]  Phone: [phone]  Fax: [phone]  Follow Up Time: 1 week    Vikas Aquino)  Neurosurgery  [address], [address] [place] [address]  Phone: [phone]  Fax: [phone]  Follow Up Time: 2 weeks    Nell Choe)  Plastic Surgery  [address] [place] [address], [address]  [place], [place] [address]  Phone: [phone]  Fax: [phone]  Follow Up Time: 1 week    Reji Cardoza)  Orthopaedic Surgery  [address]  [place] [address]  Phone: [phone]  Fax: [phone]  Follow Up Time: Routine    Dino Peña)  Surgery; Surgical Critical Care  [address] [facility] [address] [address]  Phone: [phone]  Fax: [phone]  Follow Up Time: Routine    Morgan Hernández; PhD)  Cardiac Electrophysiology; Cardiovascular Disease; Internal Medicine  [facility] - Dept of Cardiology, [address] [place] [address]  Phone: [phone]  Fax: [phone]   JONATHAN CHILD is a 65y with PMHx breast Ca S/P mastectomy, HTN, hypothyroidism who presented to [facility] 4/19/21 s/p fall down a flight of stairs with bilateral frontoparietal SAH; L nasal bone and L orbital rim fractures; sternal fracture; L 3-8, R 7th rib fractures;  L pneumothorax, L iliac wing fracture with hematoma S/P 1U pRBCs; left clavicle fx, left V finger PIP dislocation s/p CR. She is WBAT Left LE, NWB Left UE.     #SAH  - Avoid NSAIDs  - monitor neurological status, stable  - F/u neurosurgery, Dr. Vikas Aquino, upon discharge  - Precautions: falls, sternal , WB as above, AMS, cardiac, h/o breast CA    # Multiple fractures. -WBAT LLE, NWB LUE   - Cont comprehensive rehab program, PT/OT/SLP 3 hours a day, 5 days a week.  - cleared for ROM exercises for L elbow, wrist, hand.  L shoulder pendulum exercises. Sling to LUE for comfort. No lifting more than 1-3 pounds for 6 weeks.  - LSO brace whenever OOB  - Continue Lovenox for 6 weeks total (through 6/3)  - Tylenol, Lidoderm to L chest wall. Scheduled Oxycodone 10mg at 8am before therapy to help improve pain control  - plastic surgeon Dr. Nell Choe, for facial laceration, L pinky dislocation, L nasal bone and orbital rim fractures.   - Cleared to remove delfin tape for L 5th finger dislocation per Dr. Choe 5/10  - ortho, Dr. Reji Cardoza, for L clavicle and L pelvic wing fractures  - surgery, Dr. Dino Peña, for rib fractures    # Acute Hypoxic Respiratory Failure likely due to Acute B/L rib fractures, and small pneumothorax  - Incentive spirometry.   - Supplemental Oxygen PRN to keep O2 sats > 92%  - currently stable on RA    # Elevated Alk Phos  - Hepatic sono 5/7: Increased echogenicity of the hepatic parenchyma suggests hepatic steatosis. No focal hepatic masses identified. Multiple gallstones, without evidence for gallbladder wall thickening or pericholecystic fluid.  -  likely 2/2 trauma, bone fractures  - 5/7 GGT 37, alk phos 431 --> 5/10 Alk Phos 423. improving  - CMP 5/13    # H/o SVT  - Continue with toprol XL   - Can follow up in 3 months with EP Dr. Hernández [phone]. (elective ablation)    #R kidney lesion  - F/u Dr. Herrera outpatient or PCP    #Hypothyroidism  - Continue Synthroid 112mcg daily    # Depression  - Per , premorbid signs of depression  - Psych consult reviewed and appreciated  - continue Lexapro 10mg daily  - Continue Modafinil 100mg daily. Tolerating    # Sleep  - cont Rozerem 8mg    # GI/Bowel:  - At risk for constipation due to neurologic diagnosis, immobility and/or medication use  - Senna QHS, Miralax Daily    # /Bladder:   - Spangler placed 4/29/21 for urinary retention. TOV 5/6   - Increased bethanachol to 10mg BID 5/6  - persistent retention requiring SC.  consult pending    # DVT ppx:  - Lovenox  - SCDs    # Case discussed in IDT 5/6/21:  - lives with son and spouse, has HHA for iADLs but able to do ADLs independently. Owns cane and RW.  - Mark eating, Mod A grooming, dressing and toileting and shower transfer, min-mod A transfer. Mod A 20 ft WBQC, no stairs. Limited by poor carryover of spinal precautions. Severe cognitive deficits, decreased attention and memory, decreased insight and increased processing speed.  - Goal: supervision grooming, bathing LBD, Barb UBD and toilet transfers, Mark shower transfers, Mark transfers and ambulation  - EDOD: 5/19 to home will need 24 h supervision  - caregiver training    #LABS  lovenox education   consult  [facility] 5/13    ---------------  Outpatient Follow-up (Specialty/Name of physician):  Silvina Herrera  INTERNAL MEDICINE  [address] [place] [address]  Phone: [phone]  Fax: [phone]  Follow Up Time: 1 week    Vikas Aquino)  Neurosurgery  [address], [address] [place] [address]  Phone: [phone]  Fax: [phone]  Follow Up Time: 2 weeks    Nell Choe)  Plastic Surgery  [address] [place] [address], [address]  [place], [place] [id number]  Phone: [phone]  Fax: [phone]  Follow Up Time: 1 week    Reji Cardoza)  Orthopaedic Surgery  [address] [facility] [address]  [place] [address]  Phone: [phone]  Fax: [phone]  Follow Up Time: Routine    Dino Peña)  Surgery; Surgical Critical Care  [address] [place] [address] [address] [address]  Phone: [phone]  Fax: [phone]  Follow Up Time: Routine    Morgan Hernández; PhD)  Cardiac Electrophysiology; Cardiovascular Disease; Internal Medicine  [facility] - Dept of Cardiology, [address] [place] [address]  Phone: [phone]  Fax: [phone]

## 2021-05-12 LAB
APPEARANCE UR: ABNORMAL
BACTERIA # UR AUTO: ABNORMAL /HPF
BILIRUB UR-MCNC: NEGATIVE — SIGNIFICANT CHANGE UP
COLOR SPEC: YELLOW — SIGNIFICANT CHANGE UP
DIFF PNL FLD: ABNORMAL
EPI CELLS # UR: SIGNIFICANT CHANGE UP
GLUCOSE UR QL: NEGATIVE — SIGNIFICANT CHANGE UP
KETONES UR-MCNC: NEGATIVE — SIGNIFICANT CHANGE UP
LEUKOCYTE ESTERASE UR-ACNC: ABNORMAL
NITRITE UR-MCNC: POSITIVE
PH UR: 6 — SIGNIFICANT CHANGE UP (ref 5–8)
PROT UR-MCNC: 15
RBC CASTS # UR COMP ASSIST: ABNORMAL /HPF (ref 0–4)
SP GR SPEC: 1.02 — SIGNIFICANT CHANGE UP (ref 1.01–1.02)
UROBILINOGEN FLD QL: NEGATIVE — SIGNIFICANT CHANGE UP
WBC UR QL: >50 /HPF (ref 0–5)

## 2021-05-12 PROCEDURE — 99232 SBSQ HOSP IP/OBS MODERATE 35: CPT

## 2021-05-12 RX ORDER — NITROFURANTOIN MACROCRYSTAL 50 MG
100 CAPSULE ORAL
Refills: 0 | Status: DISCONTINUED | OUTPATIENT
Start: 2021-05-12 | End: 2021-05-15

## 2021-05-12 RX ADMIN — Medication 1 TABLET(S): at 12:54

## 2021-05-12 RX ADMIN — Medication 10 MILLIGRAM(S): at 05:48

## 2021-05-12 RX ADMIN — MODAFINIL 100 MILLIGRAM(S): 200 TABLET ORAL at 05:47

## 2021-05-12 RX ADMIN — LIDOCAINE 1 PATCH: 4 CREAM TOPICAL at 19:54

## 2021-05-12 RX ADMIN — ESCITALOPRAM OXALATE 10 MILLIGRAM(S): 10 TABLET, FILM COATED ORAL at 12:54

## 2021-05-12 RX ADMIN — Medication 112 MICROGRAM(S): at 05:48

## 2021-05-12 RX ADMIN — SENNA PLUS 2 TABLET(S): 8.6 TABLET ORAL at 21:42

## 2021-05-12 RX ADMIN — Medication 8 MILLIGRAM(S): at 21:42

## 2021-05-12 RX ADMIN — LIDOCAINE 1 PATCH: 4 CREAM TOPICAL at 00:26

## 2021-05-12 RX ADMIN — ENOXAPARIN SODIUM 40 MILLIGRAM(S): 100 INJECTION SUBCUTANEOUS at 05:48

## 2021-05-12 RX ADMIN — LIDOCAINE 1 PATCH: 4 CREAM TOPICAL at 12:54

## 2021-05-12 RX ADMIN — Medication 100 MILLIGRAM(S): at 17:44

## 2021-05-12 RX ADMIN — Medication 10 MILLIGRAM(S): at 17:44

## 2021-05-12 RX ADMIN — ZINC OXIDE 1 APPLICATION(S): 200 OINTMENT TOPICAL at 05:45

## 2021-05-12 RX ADMIN — NYSTATIN CREAM 1 APPLICATION(S): 100000 CREAM TOPICAL at 05:45

## 2021-05-12 RX ADMIN — Medication 100 MILLIGRAM(S): at 05:47

## 2021-05-12 RX ADMIN — NYSTATIN CREAM 1 APPLICATION(S): 100000 CREAM TOPICAL at 17:43

## 2021-05-12 RX ADMIN — POLYETHYLENE GLYCOL 3350 17 GRAM(S): 17 POWDER, FOR SOLUTION ORAL at 12:54

## 2021-05-12 RX ADMIN — ZINC OXIDE 1 APPLICATION(S): 200 OINTMENT TOPICAL at 17:45

## 2021-05-12 RX ADMIN — Medication 1 MILLIGRAM(S): at 12:54

## 2021-05-12 RX ADMIN — OXYCODONE HYDROCHLORIDE 10 MILLIGRAM(S): 5 TABLET ORAL at 08:38

## 2021-05-12 RX ADMIN — OXYCODONE HYDROCHLORIDE 10 MILLIGRAM(S): 5 TABLET ORAL at 08:39

## 2021-05-12 NOTE — PROGRESS NOTE ADULT - ASSESSMENT
JONATHAN CHILD is a 65y with PMHx breast Ca S/P mastectomy, HTN, hypothyroidism who presented to [facility] 4/19/21 s/p fall down a flight of stairs with bilateral frontoparietal SAH; L nasal bone and L orbital rim fractures; sternal fracture; L 3-8, R 7th rib fractures;  L pneumothorax, L iliac wing fracture with hematoma S/P 1U pRBCs; left clavicle fx, left V finger PIP dislocation s/p CR. She is WBAT Left LE, NWB Left UE.     #SAH  - Avoid NSAIDs  - monitor neurological status, stable  - F/u neurosurgery, Dr. Vikas Aquino, upon discharge  - Precautions: falls, sternal , WB as above, AMS, cardiac, h/o breast CA    # Multiple fractures. WBAT LLE, NWB LUE   - Cont comprehensive rehab program, PT/OT/SLP 3 hours a day, 5 days a week.  - cleared for ROM exercises for L elbow, wrist, hand.  L shoulder pendulum exercises. Sling to LUE for comfort. No lifting more than 1-3 pounds for 6 weeks.  - LSO brace whenever OOB  - Cleared to remove delfin tape for L 5th finger dislocation per Dr. Choe 5/10  - Continue Lovenox for 6 weeks total (through 6/3)  - pain management as below  - follow up:  ·	plastic surgeon Dr. Nell Choe, for facial laceration, L pinky dislocation, L nasal bone and orbital rim fractures.   ·	ortho, Dr. Reji Cardoza, for L clavicle and L pelvic wing fractures  ·	surgery, Dr. Dino Peña, for rib fractures    # Acute Hypoxic Respiratory Failure likely due to Acute B/L rib fractures, and small pneumothorax  - Incentive spirometry. Breathing exercises discussed with patient   - Supplemental Oxygen PRN to keep O2 sats > 92%  - currently stable on RA    # Elevated Alk Phos likely 2/2 trauma, bone fractures  - Hepatic sono 5/7: suggestive hepatic steatosis. No focal hepatic masses identified. Multiple gallstones, without evidence for gallbladder wall thickening or pericholecystic fluid.  - 5/7 GGT 37, alk phos 431 --> 5/10 Alk Phos 423. improving  - CMP 5/13    # H/o SVT  - Continue with toprol XL   - Can follow up in 3 months with EP Dr. Hernández [phone]. (elective ablation)    #R kidney lesion  - F/u Dr. Herrera outpatient or PCP    #Hypothyroidism  - Continue Synthroid 112mcg daily    # Depression  - Psychiatry f/u 5/11 read and appreciated:   ·	Continue Lexapro 10mg  ·	Continue Modafinil 100mg  ·	Continue Ramelteon 8mg    # Pain  - Tylenol  - Lidoderm to L chest wall  - Scheduled Oxycodone 10mg at 8am before therapy to help improve pain control    # GI/Bowel:  - Senna QHS, Miralax Daily    # /Bladder:   - Spangler placed 4/29/21 for urinary retention. TOV 5/6   - Increased bethanachol to 10mg BID 5/6  - persistent retention requiring SC.  consult pending    # DVT ppx:  - Lovenox  - SCDs    # Case discussed in IDT 5/12/21:  - lives with son and spouse, has HHA for iADLs but able to do ADLs independently. Owns cane and RW.  - ambulates 40 feet with WBQC min-mod assist, 2 step with 1 HR and mod assist, supervision eating, min assist grooming, mod assist UB/LB dressing, mod assist toileting, max assist bathing  - Goal: supervision grooming, bathing LBD, Mark shower transfers, Mark transfers and ambulation  - multiple attempts by team to reach family to provide training unsuccessful. Given cognitive and physical deficits, uncertainty of availability support in community (requires round the clock at this time) will recommend JULI    #LABS   consult  [facility] 5/13    ---------------  Outpatient Follow-up (Specialty/Name of physician):  Silvina Herrera  INTERNAL MEDICINE  [address] [place] [address]  Phone: [phone]  Fax: [phone]  Follow Up Time: 1 week    Vikas Aquino)  Neurosurgery  [address] [facility], [address] [place] [address]  Phone: [phone]  Fax: [phone]  Follow Up Time: 2 weeks    Nell Choe)  Plastic Surgery  [address] [place] [address], [address]  [place], [place] [address]  Phone: [phone]  Fax: [phone]  Follow Up Time: 1 week    Reji Cardoza)  Orthopaedic Surgery  [address] [facility] [address]  [place] [address]  Phone: [phone]  Fax: [phone]  Follow Up Time: Routine    Dino Peña)  Surgery; Surgical Critical Care  [address] [facility] [address]  Phone: [phone]  Fax: [phone]  Follow Up Time: Routine    Morgan Hernández; PhD)  Cardiac Electrophysiology; Cardiovascular Disease; Internal Medicine  [facility] - Dept of Cardiology, [address] [place] [address]  Phone: [phone]  Fax: [phone]   JONATHAN CHILD is a 65y with PMHx breast Ca S/P mastectomy, HTN, hypothyroidism who presented to [facility] 4/19/21 s/p fall down a flight of stairs with bilateral frontoparietal SAH; L nasal bone and L orbital rim fractures; sternal fracture; L 3-8, R 7th rib fractures;  L pneumothorax, L iliac wing fracture with hematoma S/P 1U pRBCs; left clavicle fx, left V finger PIP dislocation s/p CR. She is WBAT Left LE, NWB Left UE.     #SAH  - Avoid NSAIDs  - monitor neurological status, stable  - F/u neurosurgery, Dr. Vikas Aquino, upon discharge  - Precautions: falls, sternal , WB as above, AMS, cardiac, h/o breast CA    # Multiple fractures. WBAT LLE, NWB LUE   - Cont comprehensive rehab program, PT/OT/SLP 3 hours a day, 5 days a week.  - cleared for ROM exercises for L elbow, wrist, hand.  L shoulder pendulum exercises. Sling to LUE for comfort. No lifting more than 1-3 pounds for 6 weeks.  - LSO brace whenever OOB  - Cleared to remove delfin tape for L 5th finger dislocation per Dr. Choe 5/10  - Continue Lovenox for 6 weeks total (through 6/3)  - pain management as below  - follow up:  ·	plastic surgeon Dr. Nell Choe, for facial laceration, L pinky dislocation, L nasal bone and orbital rim fractures.   ·	ortho, Dr. Reji Cardoza, for L clavicle and L pelvic wing fractures  ·	surgery, Dr. Dino Peña, for rib fractures    # Acute Hypoxic Respiratory Failure likely due to Acute B/L rib fractures, and small pneumothorax  - Incentive spirometry. Breathing exercises discussed with patient   - Supplemental Oxygen PRN to keep O2 sats > 92%  - currently stable on RA    # Elevated Alk Phos likely 2/2 trauma, bone fractures  - Hepatic sono 5/7: suggestive hepatic steatosis. No focal hepatic masses identified. Multiple gallstones, without evidence for gallbladder wall thickening or pericholecystic fluid.  - 5/7 GGT 37, alk phos 431 --> 5/10 Alk Phos 423. improving  - CMP 5/13    # H/o SVT  - Continue with toprol XL   - Can follow up in 3 months with EP Dr. Hernández [phone]. (elective ablation)    #R kidney lesion  - F/u Dr. Herrera outpatient or PCP    #Hypothyroidism  - Continue Synthroid 112mcg daily    # Depression  - Psychiatry f/u 5/11 read and appreciated:   ·	Continue Lexapro 10mg  ·	Continue Modafinil 100mg  ·	Continue Ramelteon 8mg    # Pain  - Tylenol  - Lidoderm to L chest wall  - Scheduled Oxycodone 10mg at 8am before therapy to help improve pain control    # GI/Bowel:  - Senna QHS, Miralax Daily    # /Bladder:   - Spangler placed 4/29/21 for urinary retention. TOV 5/6   - Increased bethanachol to 10mg BID 5/6  - persistent retention requiring SC.  consult pending  - +cloudy UA PVR >500 ml. UA ordered 5/12    # DVT ppx:  - Lovenox  - SCDs    # Case discussed in IDT 5/12/21:  - lives with son and spouse, has HHA for iADLs but able to do ADLs independently. Owns cane and RW.  - ambulates 40 feet with WBQC min-mod assist, 2 step with 1 HR and mod assist, supervision eating, min assist grooming, mod assist UB/LB dressing, mod assist toileting, max assist bathing  - Goal: supervision grooming, bathing LBD, Mark shower transfers, Mark transfers and ambulation  - multiple attempts by team to reach family to provide training unsuccessful. Given cognitive and physical deficits, uncertainty of availability support in community (requires round the clock at this time) will recommend JULI    #LABS   consult  UA  CBC BMP 5/13    ---------------  Outpatient Follow-up (Specialty/Name of physician):  Silvina Herrera  INTERNAL MEDICINE  [address] [place] [address]  Phone: [phone]  Fax: [phone]  Follow Up Time: 1 week    Vikas Aquino)  Neurosurgery  [address] [facility], [address] [place] [address]  Phone: [phone]  Fax: [phone]  Follow Up Time: 2 weeks    Nell Choe)  Plastic Surgery  [address] [place] [address], [address]  [place], [place] [id number]  Phone: [phone]  Fax: [phone]  Follow Up Time: 1 week    Reji Cardoza)  Orthopaedic Surgery  [address] [place] [address] [address]  [place] [address]  Phone: [phone]  Fax: [phone]  Follow Up Time: Routine    Dino Peña)  Surgery; Surgical Critical Care  [address] [place] [address], [address] [address]  Phone: [phone]  Fax: [phone]  Follow Up Time: Routine    Morgan Hernández; PhD)  Cardiac Electrophysiology; Cardiovascular Disease; Internal Medicine  [facility] - Dept of Cardiology, [address] [place] [address]  Phone: [phone]  Fax: [phone]   JONATHAN CHILD is a 65y with PMHx breast Ca S/P mastectomy, HTN, hypothyroidism who presented to [facility] 4/19/21 s/p fall down a flight of stairs with bilateral frontoparietal SAH; L nasal bone and L orbital rim fractures; sternal fracture; L 3-8, R 7th rib fractures;  L pneumothorax, L iliac wing fracture with hematoma S/P 1U pRBCs; left clavicle fx, left V finger PIP dislocation s/p CR. She is WBAT Left LE, NWB Left UE.     #SAH  - Avoid NSAIDs  - monitor neurological status, stable  - F/u neurosurgery, Dr. Vikas Aquino, upon discharge  - Precautions: falls, sternal , WB as above, AMS, cardiac, h/o breast CA    # Multiple fractures. WBAT LLE, NWB LUE   - Cont comprehensive rehab program, PT/OT/SLP 3 hours a day, 5 days a week.  - cleared for ROM exercises for L elbow, wrist, hand.  L shoulder pendulum exercises. Sling to LUE for comfort. No lifting more than 1-3 pounds for 6 weeks.  - LSO brace whenever OOB  - Cleared to remove delfin tape for L 5th finger dislocation per Dr. Choe 5/10  - Continue Lovenox for 6 weeks total (through 6/3)  - pain management as below  - follow up:  ·	plastic surgeon Dr. Nell Choe, for facial laceration, L pinky dislocation, L nasal bone and orbital rim fractures.   ·	ortho, Dr. Reji Cardoza, for L clavicle and L pelvic wing fractures  ·	surgery, Dr. Dino Peña, for rib fractures    # Acute Hypoxic Respiratory Failure likely due to Acute B/L rib fractures, and small pneumothorax  - Incentive spirometry. Breathing exercises discussed with patient   - Supplemental Oxygen PRN to keep O2 sats > 92%  - currently stable on RA    # Elevated Alk Phos likely 2/2 trauma, bone fractures  - Hepatic sono 5/7: suggestive hepatic steatosis. No focal hepatic masses identified. Multiple gallstones, without evidence for gallbladder wall thickening or pericholecystic fluid.  - 5/7 GGT 37, alk phos 431 --> 5/10 Alk Phos 423. improving  - CMP 5/13    # H/o SVT  - Continue with toprol XL   - Can follow up in 3 months with EP Dr. Hernández [phone]. (elective ablation)    #R kidney lesion  - F/u Dr. Herrera outpatient or PCP    #Hypothyroidism  - Continue Synthroid 112mcg daily    # Depression  - Psychiatry f/u 5/11 read and appreciated:   ·	Continue Lexapro 10mg  ·	Continue Modafinil 100mg  ·	Continue Ramelteon 8mg    # Pain  - Tylenol  - Lidoderm to L chest wall  - Scheduled Oxycodone 10mg at 8am before therapy to help improve pain control    # GI/Bowel:  - Senna QHS, Miralax Daily    # /Bladder:   - Spangler placed 4/29/21 for urinary retention. TOV 5/6   - Increased bethanachol to 10mg BID 5/6  - persistent retention requiring SC.  consult pending  - +cloudy UA PVR >500 ml. UA and C+S ordered 5/12  - start nitrofurantoin 100 q12 5/12 pending Urine Cx results    # DVT ppx:  - Lovenox  - SCDs    # Case discussed in IDT 5/12/21:  - lives with son and spouse, has HHA for iADLs but able to do ADLs independently. Owns cane and RW.  - ambulates 40 feet with WBQC min-mod assist, 2 step with 1 HR and mod assist, supervision eating, min assist grooming, mod assist UB/LB dressing, mod assist toileting, max assist bathing  - Goal: supervision grooming, bathing LBD, Mark shower transfers, Mark transfers and ambulation  - multiple attempts by team to reach family to provide training unsuccessful. Given cognitive and physical deficits, uncertainty of availability support in community (requires round the clock at this time) will recommend JULI    #LABS   consult  UA  CBC BMP 5/13    ---------------  Outpatient Follow-up (Specialty/Name of physician):  Silvina Herrera  INTERNAL MEDICINE  [address] [place] [address]  Phone: [phone]  Fax: [phone]  Follow Up Time: 1 week    Vikas Aquino)  Neurosurgery  [address], [address] [place] [address]  Phone: [phone]  Fax: [phone]  Follow Up Time: 2 weeks    Nell Choe)  Plastic Surgery  [address] [facility], [address]  [place], [place] [address]  Phone: [phone]  Fax: [phone]  Follow Up Time: 1 week    Reji Cardoza)  Orthopaedic Surgery  [address] [facility], [address]  [place] [address]  Phone: [phone]  Fax: [phone]  Follow Up Time: Routine    Dino Peña)  Surgery; Surgical Critical Care  [address] [facility] [address] [address]  Phone: [phone]  Fax: [phone]  Follow Up Time: Routine    Morgan Hernández; PhD)  Cardiac Electrophysiology; Cardiovascular Disease; Internal Medicine  [facility] - Dept of Cardiology, [address] [place] [address]  Phone: [phone]  Fax: [phone]

## 2021-05-12 NOTE — PROGRESS NOTE ADULT - SUBJECTIVE AND OBJECTIVE BOX
Patient is a 65y old  Female who presents with a chief complaint of SAH and fractures of left-sided ribs, left iliac wing with hematoma, S/P closed reduction of left pinky PIP dislocation after trauma (11 May 2021 10:58)      HPI:  65F PMHx breast cancer s/p mastectomy, HTN, hypothyroidism, not on full anticoagulation/antiplatelets per EMS, presented to [facility] 4/19/21as a level 2 trauma activation after a fall from a flight of stairs. Patient only able to say "ow" in trauma bay, unable to provide further history. However per son, normal mentation at baseline. In the trauma bay, airway was intact with bilateral breath sounds, O2 saturation 100% on room air. Initially blood pressure was 140s systolic however on repeat 90s. NS@100 initiated. Secondary significant for left forehead laceration, left chest wall tenderness, left pelvic tenderness.     Ms. Arthur was hypotensive in the trauma bay and was transferred to the Surgical ICU after imaging was obtained. Her injury list is below.  (1) SAH, bilateral: stable on repeat imaging. Seven day course of Keppra for post-traumatic seizure prophylaxis.  (2) left 3-8 and right 7th rib fractures with occult left pneumothorax: pain control  (3) left iliac wing fracture with hematoma: required transfusion early in her ICU course, after which CBC were stable  (4) left forehead laceration s/p suture repair  (5) s/p successful closed reduction of left pinky PIP dislocation with delfin splint  (6) left clavicle fracture  (7) left nasal bone and left orbital rim fractures  (8) sternal fracture  Incidental findings: right renal lesion. Pt's  was notified and copy of the report given to him.     The patient was admitted to Trauma Surgery under SICU care.  Ortho was consulted for L. iliac wing fx, sternal fx, L. clavicle fx- recommended WBAT of the affected left lower extremity with walker, WBAT L shoulder, pendulum exercises okay, no lifting, sling for comfort, Encourage ROM of elbow/wrist/hand, PT/OT consult, NO urgent orthopedic surgical intervention at this time.    Neurosurgery consulted- recommended repeat CTH, which was stable.     PRS was consulted for non displaced left superior orbital rim and non displaced left nasal bone fracture--> No indication for operative fixation of these non displaced fractures; recommend ice to face to aid in swelling and analgesia.    She was placed on hemorrhage watch in the SICU given pelvic fracture and surrounding hematoma.  Behavior health was consulted for delirium.    Hand was consulted for L. pinky PIP dislocation- s/p closed reduction, rec Cont delfin taping for now.    4/21- CTH stable  - Added home lexapro  - Added NS @ 50cc/hr to supplement poor PO intake  - Lactate cleared (1.9)    4/22- Transfused 1u pRBC, Hct 20.9 -> 26.9  - Went into SVT, given adenosine x1 and started on metoprolol 25mg q12hr  - Added oxycodone 5mg q4hrs prn for increased pain control  - PO intake improved, IVL    4/23- EP was consulted for SVT, recc continue lopressor 25mg   Metoprolol PO increased from 25mg q12hrs to 25mg q6hrs.   - Added salt tabs 1g q8hrs and started on NS @ 50cc/hr for hyponatremia  - Spangler discontinued, passed TOV  - Added lovenox  - Cervical collar cleared by confrontational exam     4/24- Patient transferred from SICU to surgical floor in stable condition.  Discharge planning to acute rehab.     Stable from neurologic perspective consistent with mild TBI  Multiple rib fractures - breathing comfortable with multimodal pain control    On day of discharge, the patient was tolerating diet, working with PT well and pain controlled. The patient was medically stable for discharge to acute rehab with instructions for outpatient follow up 4/27/21. 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    C Section    Abnormality, eye  h/o left eye vitrectomy    S/P D&amp;C (status post dilation and curettage)    H/O left mastectomy        MEDICATIONS  (STANDING):  bethanechol 10 milliGRAM(s) Oral two times a day  enoxaparin Injectable 40 milliGRAM(s) SubCutaneous every 24 hours  escitalopram 10 milliGRAM(s) Oral daily  folic acid 1 milliGRAM(s) Oral daily  levothyroxine 112 MICROGram(s) Oral daily  lidocaine   Patch 1 Patch Transdermal daily  metoprolol succinate  milliGRAM(s) Oral daily  modafinil 100 milliGRAM(s) Oral <User Schedule>  multivitamin 1 Tablet(s) Oral daily  nystatin Powder 1 Application(s) Topical two times a day  oxyCODONE    IR 10 milliGRAM(s) Oral <User Schedule>  polyethylene glycol 3350 17 Gram(s) Oral daily  ramelteon 8 milliGRAM(s) Oral at bedtime  senna 2 Tablet(s) Oral at bedtime  zinc oxide 40% Ointment 1 Application(s) Topical two times a day    MEDICATIONS  (PRN):  acetaminophen   Tablet .. 650 milliGRAM(s) Oral every 6 hours PRN Temp greater or equal to 38C (100.4F), Mild Pain (1 - 3)      Allergies    Tapazole (Hives)    Intolerances          VITALS  65y  Vital Signs Last 24 Hrs  T(C): 36.6 (12 May 2021 09:40), Max: 37.4 (11 May 2021 20:46)  T(F): 97.9 (12 May 2021 09:40), Max: 99.3 (11 May 2021 20:46)  HR: 68 (12 May 2021 09:40) (66 - 72)  BP: 105/68 (12 May 2021 09:40) (101/65 - 113/74)  BP(mean): --  RR: 14 (12 May 2021 09:40) (14 - 14)  SpO2: 99% (12 May 2021 09:40) (94% - 99%)  Daily     Daily         RECENT LABS:                      CAPILLARY BLOOD GLUCOSE            Review of Systems:   · Additional ROS	 Patient appears comfortable. States she slept well overnight, pain controlled, not interfering with therapy. LSO in place. Denies B/B issues    mood appears stable, reduced initiation, flat affect and dec prosody speech      Physical Exam:   · Constitutional	detailed exam  · Constitutional Details	no distress  · Constitutional Comments	alert, compliant with exam/questions with reduced overall awareness and recall  	  	  · Respiratory	detailed exam  · Respiratory Details	breath sounds equal; respirations non-labored; clear to auscultation bilaterally; no wheezes  fair inspriatory effort  · Cardiovascular	detailed exam  · Cardiovascular Details	regular rate and rhythm  · Gastrointestinal	detailed exam  · GI Normal	nontender; no distention; bowel sounds normal  	  · Extremities	 bialteral calves soft, NT no erythema or warmth  	no swelling left V finger or wrist/hand  	  	  	             Patient is a 65y old  Female who presents with a chief complaint of SAH and fractures of left-sided ribs, left iliac wing with hematoma, S/P closed reduction of left pinky PIP dislocation after trauma (11 May 2021 10:58)      HPI:  65F PMHx breast cancer s/p mastectomy, HTN, hypothyroidism, not on full anticoagulation/antiplatelets per EMS, presented to [facility] 4/19/21as a level 2 trauma activation after a fall from a flight of stairs. Patient only able to say "ow" in trauma bay, unable to provide further history. However per son, normal mentation at baseline. In the trauma bay, airway was intact with bilateral breath sounds, O2 saturation 100% on room air. Initially blood pressure was 140s systolic however on repeat 90s. NS@100 initiated. Secondary significant for left forehead laceration, left chest wall tenderness, left pelvic tenderness.     Ms. Arthur was hypotensive in the trauma bay and was transferred to the Surgical ICU after imaging was obtained. Her injury list is below.  (1) SAH, bilateral: stable on repeat imaging. Seven day course of Keppra for post-traumatic seizure prophylaxis.  (2) left 3-8 and right 7th rib fractures with occult left pneumothorax: pain control  (3) left iliac wing fracture with hematoma: required transfusion early in her ICU course, after which CBC were stable  (4) left forehead laceration s/p suture repair  (5) s/p successful closed reduction of left pinky PIP dislocation with delfin splint  (6) left clavicle fracture  (7) left nasal bone and left orbital rim fractures  (8) sternal fracture  Incidental findings: right renal lesion. Pt's  was notified and copy of the report given to him.     The patient was admitted to Trauma Surgery under SICU care.  Ortho was consulted for L. iliac wing fx, sternal fx, L. clavicle fx- recommended WBAT of the affected left lower extremity with walker, WBAT L shoulder, pendulum exercises okay, no lifting, sling for comfort, Encourage ROM of elbow/wrist/hand, PT/OT consult, NO urgent orthopedic surgical intervention at this time.    Neurosurgery consulted- recommended repeat CTH, which was stable.     PRS was consulted for non displaced left superior orbital rim and non displaced left nasal bone fracture--> No indication for operative fixation of these non displaced fractures; recommend ice to face to aid in swelling and analgesia.    She was placed on hemorrhage watch in the SICU given pelvic fracture and surrounding hematoma.  Behavior health was consulted for delirium.    Hand was consulted for L. pinky PIP dislocation- s/p closed reduction, rec Cont delfin taping for now.    4/21- CTH stable  - Added home lexapro  - Added NS @ 50cc/hr to supplement poor PO intake  - Lactate cleared (1.9)    4/22- Transfused 1u pRBC, Hct 20.9 -> 26.9  - Went into SVT, given adenosine x1 and started on metoprolol 25mg q12hr  - Added oxycodone 5mg q4hrs prn for increased pain control  - PO intake improved, IVL    4/23- EP was consulted for SVT, recc continue lopressor 25mg   Metoprolol PO increased from 25mg q12hrs to 25mg q6hrs.   - Added salt tabs 1g q8hrs and started on NS @ 50cc/hr for hyponatremia  - Spangler discontinued, passed TOV  - Added lovenox  - Cervical collar cleared by confrontational exam     4/24- Patient transferred from SICU to surgical floor in stable condition.  Discharge planning to acute rehab.     Stable from neurologic perspective consistent with mild TBI  Multiple rib fractures - breathing comfortable with multimodal pain control    On day of discharge, the patient was tolerating diet, working with PT well and pain controlled. The patient was medically stable for discharge to acute rehab with instructions for outpatient follow up 4/27/21. 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    C Section    Abnormality, eye  h/o left eye vitrectomy    S/P D&amp;C (status post dilation and curettage)    H/O left mastectomy        MEDICATIONS  (STANDING):  bethanechol 10 milliGRAM(s) Oral two times a day  enoxaparin Injectable 40 milliGRAM(s) SubCutaneous every 24 hours  escitalopram 10 milliGRAM(s) Oral daily  folic acid 1 milliGRAM(s) Oral daily  levothyroxine 112 MICROGram(s) Oral daily  lidocaine   Patch 1 Patch Transdermal daily  metoprolol succinate  milliGRAM(s) Oral daily  modafinil 100 milliGRAM(s) Oral <User Schedule>  multivitamin 1 Tablet(s) Oral daily  nystatin Powder 1 Application(s) Topical two times a day  oxyCODONE    IR 10 milliGRAM(s) Oral <User Schedule>  polyethylene glycol 3350 17 Gram(s) Oral daily  ramelteon 8 milliGRAM(s) Oral at bedtime  senna 2 Tablet(s) Oral at bedtime  zinc oxide 40% Ointment 1 Application(s) Topical two times a day    MEDICATIONS  (PRN):  acetaminophen   Tablet .. 650 milliGRAM(s) Oral every 6 hours PRN Temp greater or equal to 38C (100.4F), Mild Pain (1 - 3)      Allergies    Tapazole (Hives)    Intolerances          VITALS  65y  Vital Signs Last 24 Hrs  T(C): 36.6 (12 May 2021 09:40), Max: 37.4 (11 May 2021 20:46)  T(F): 97.9 (12 May 2021 09:40), Max: 99.3 (11 May 2021 20:46)  HR: 68 (12 May 2021 09:40) (66 - 72)  BP: 105/68 (12 May 2021 09:40) (101/65 - 113/74)  BP(mean): --  RR: 14 (12 May 2021 09:40) (14 - 14)  SpO2: 99% (12 May 2021 09:40) (94% - 99%)  Daily     Daily         RECENT LABS:                      CAPILLARY BLOOD GLUCOSE            Review of Systems:   · Additional ROS	 Patient appears comfortable. States she slept well overnight, pain controlled, not interfering with therapy. LSO in place. Retained, >500 ml and returned cloudy urine    mood appears stable, reduced initiation, flat affect and dec prosody speech          Physical Exam:   · Constitutional	detailed exam  · Constitutional Details	no distress  · Constitutional Comments	alert, compliant with exam/questions with reduced overall awareness and recall  	  	  · Respiratory	detailed exam  · Respiratory Details	breath sounds equal; respirations non-labored; clear to auscultation bilaterally; no wheezes  fair inspriatory effort  · Cardiovascular	detailed exam  · Cardiovascular Details	regular rate and rhythm  · Gastrointestinal	detailed exam  · GI Normal	nontender; no distention; bowel sounds normal  	  · Extremities	 bialteral calves soft, NT no erythema or warmth  	no swelling left V finger or wrist/hand

## 2021-05-13 LAB
ALBUMIN SERPL ELPH-MCNC: 3 G/DL — LOW (ref 3.3–5)
ALP SERPL-CCNC: 395 U/L — HIGH (ref 40–120)
ALT FLD-CCNC: 21 U/L — SIGNIFICANT CHANGE UP (ref 10–45)
ANION GAP SERPL CALC-SCNC: 10 MMOL/L — SIGNIFICANT CHANGE UP (ref 5–17)
AST SERPL-CCNC: 14 U/L — SIGNIFICANT CHANGE UP (ref 10–40)
BASOPHILS # BLD AUTO: 0.02 K/UL — SIGNIFICANT CHANGE UP (ref 0–0.2)
BASOPHILS NFR BLD AUTO: 0.3 % — SIGNIFICANT CHANGE UP (ref 0–2)
BILIRUB SERPL-MCNC: 0.4 MG/DL — SIGNIFICANT CHANGE UP (ref 0.2–1.2)
BUN SERPL-MCNC: 23 MG/DL — SIGNIFICANT CHANGE UP (ref 7–23)
CALCIUM SERPL-MCNC: 8.7 MG/DL — SIGNIFICANT CHANGE UP (ref 8.4–10.5)
CHLORIDE SERPL-SCNC: 107 MMOL/L — SIGNIFICANT CHANGE UP (ref 96–108)
CO2 SERPL-SCNC: 25 MMOL/L — SIGNIFICANT CHANGE UP (ref 22–31)
CREAT SERPL-MCNC: 0.55 MG/DL — SIGNIFICANT CHANGE UP (ref 0.5–1.3)
EOSINOPHIL # BLD AUTO: 0.1 K/UL — SIGNIFICANT CHANGE UP (ref 0–0.5)
EOSINOPHIL NFR BLD AUTO: 1.3 % — SIGNIFICANT CHANGE UP (ref 0–6)
GLUCOSE SERPL-MCNC: 113 MG/DL — HIGH (ref 70–99)
HCT VFR BLD CALC: 35.8 % — SIGNIFICANT CHANGE UP (ref 34.5–45)
HGB BLD-MCNC: 11.4 G/DL — LOW (ref 11.5–15.5)
IMM GRANULOCYTES NFR BLD AUTO: 0.9 % — SIGNIFICANT CHANGE UP (ref 0–1.5)
LYMPHOCYTES # BLD AUTO: 1.43 K/UL — SIGNIFICANT CHANGE UP (ref 1–3.3)
LYMPHOCYTES # BLD AUTO: 18.5 % — SIGNIFICANT CHANGE UP (ref 13–44)
MCHC RBC-ENTMCNC: 30 PG — SIGNIFICANT CHANGE UP (ref 27–34)
MCHC RBC-ENTMCNC: 31.8 GM/DL — LOW (ref 32–36)
MCV RBC AUTO: 94.2 FL — SIGNIFICANT CHANGE UP (ref 80–100)
MONOCYTES # BLD AUTO: 0.56 K/UL — SIGNIFICANT CHANGE UP (ref 0–0.9)
MONOCYTES NFR BLD AUTO: 7.3 % — SIGNIFICANT CHANGE UP (ref 2–14)
NEUTROPHILS # BLD AUTO: 5.54 K/UL — SIGNIFICANT CHANGE UP (ref 1.8–7.4)
NEUTROPHILS NFR BLD AUTO: 71.7 % — SIGNIFICANT CHANGE UP (ref 43–77)
NRBC # BLD: 0 /100 WBCS — SIGNIFICANT CHANGE UP (ref 0–0)
PLATELET # BLD AUTO: 242 K/UL — SIGNIFICANT CHANGE UP (ref 150–400)
POTASSIUM SERPL-MCNC: 4.3 MMOL/L — SIGNIFICANT CHANGE UP (ref 3.5–5.3)
POTASSIUM SERPL-SCNC: 4.3 MMOL/L — SIGNIFICANT CHANGE UP (ref 3.5–5.3)
PROT SERPL-MCNC: 6.3 G/DL — SIGNIFICANT CHANGE UP (ref 6–8.3)
RBC # BLD: 3.8 M/UL — SIGNIFICANT CHANGE UP (ref 3.8–5.2)
RBC # FLD: 15.3 % — HIGH (ref 10.3–14.5)
SODIUM SERPL-SCNC: 142 MMOL/L — SIGNIFICANT CHANGE UP (ref 135–145)
WBC # BLD: 7.72 K/UL — SIGNIFICANT CHANGE UP (ref 3.8–10.5)
WBC # FLD AUTO: 7.72 K/UL — SIGNIFICANT CHANGE UP (ref 3.8–10.5)

## 2021-05-13 PROCEDURE — 99232 SBSQ HOSP IP/OBS MODERATE 35: CPT

## 2021-05-13 RX ORDER — TAMSULOSIN HYDROCHLORIDE 0.4 MG/1
0.4 CAPSULE ORAL AT BEDTIME
Refills: 0 | Status: DISCONTINUED | OUTPATIENT
Start: 2021-05-13 | End: 2021-06-02

## 2021-05-13 RX ORDER — MODAFINIL 200 MG/1
100 TABLET ORAL
Refills: 0 | Status: DISCONTINUED | OUTPATIENT
Start: 2021-05-13 | End: 2021-05-19

## 2021-05-13 RX ADMIN — ESCITALOPRAM OXALATE 10 MILLIGRAM(S): 10 TABLET, FILM COATED ORAL at 12:35

## 2021-05-13 RX ADMIN — LIDOCAINE 1 PATCH: 4 CREAM TOPICAL at 00:02

## 2021-05-13 RX ADMIN — MODAFINIL 100 MILLIGRAM(S): 200 TABLET ORAL at 09:05

## 2021-05-13 RX ADMIN — TAMSULOSIN HYDROCHLORIDE 0.4 MILLIGRAM(S): 0.4 CAPSULE ORAL at 21:01

## 2021-05-13 RX ADMIN — Medication 100 MILLIGRAM(S): at 06:11

## 2021-05-13 RX ADMIN — SENNA PLUS 2 TABLET(S): 8.6 TABLET ORAL at 21:00

## 2021-05-13 RX ADMIN — NYSTATIN CREAM 1 APPLICATION(S): 100000 CREAM TOPICAL at 06:10

## 2021-05-13 RX ADMIN — Medication 112 MICROGRAM(S): at 06:11

## 2021-05-13 RX ADMIN — OXYCODONE HYDROCHLORIDE 10 MILLIGRAM(S): 5 TABLET ORAL at 07:57

## 2021-05-13 RX ADMIN — Medication 8 MILLIGRAM(S): at 21:00

## 2021-05-13 RX ADMIN — NYSTATIN CREAM 1 APPLICATION(S): 100000 CREAM TOPICAL at 18:56

## 2021-05-13 RX ADMIN — MODAFINIL 100 MILLIGRAM(S): 200 TABLET ORAL at 06:12

## 2021-05-13 RX ADMIN — LIDOCAINE 1 PATCH: 4 CREAM TOPICAL at 19:24

## 2021-05-13 RX ADMIN — LIDOCAINE 1 PATCH: 4 CREAM TOPICAL at 12:35

## 2021-05-13 RX ADMIN — Medication 1 MILLIGRAM(S): at 12:35

## 2021-05-13 RX ADMIN — Medication 100 MILLIGRAM(S): at 06:10

## 2021-05-13 RX ADMIN — ENOXAPARIN SODIUM 40 MILLIGRAM(S): 100 INJECTION SUBCUTANEOUS at 06:10

## 2021-05-13 RX ADMIN — POLYETHYLENE GLYCOL 3350 17 GRAM(S): 17 POWDER, FOR SOLUTION ORAL at 12:35

## 2021-05-13 RX ADMIN — ZINC OXIDE 1 APPLICATION(S): 200 OINTMENT TOPICAL at 06:10

## 2021-05-13 RX ADMIN — Medication 100 MILLIGRAM(S): at 17:22

## 2021-05-13 RX ADMIN — Medication 1 TABLET(S): at 12:35

## 2021-05-13 RX ADMIN — Medication 10 MILLIGRAM(S): at 17:22

## 2021-05-13 RX ADMIN — Medication 10 MILLIGRAM(S): at 06:10

## 2021-05-13 RX ADMIN — ZINC OXIDE 1 APPLICATION(S): 200 OINTMENT TOPICAL at 18:57

## 2021-05-13 NOTE — PROGRESS NOTE ADULT - ASSESSMENT
JONATHAN CHILD is a 65y with PMHx breast Ca S/P mastectomy, HTN, hypothyroidism who presented to [facility] 4/19/21 s/p fall down a flight of stairs with bilateral frontoparietal SAH; L nasal bone and L orbital rim fractures; sternal fracture; L 3-8, R 7th rib fractures;  L pneumothorax, L iliac wing fracture with hematoma S/P 1U pRBCs; left clavicle fx, left V finger PIP dislocation s/p CR. She is WBAT Left LE, NWB Left UE.     #SAH  - Avoid NSAIDs  - monitor neurological status, stable  - F/u neurosurgery, Dr. Vikas Aquino, upon discharge  - Precautions: falls, sternal , WB as above, AMS, cardiac, h/o breast CA    # Multiple fractures. WBAT LLE, NWB LUE   - Cont comprehensive rehab program, PT/OT/SLP 3 hours a day, 5 days a week.  - cleared for ROM exercises for L elbow, wrist, hand.  L shoulder pendulum exercises. Sling to LUE for comfort. No lifting more than 1-3 pounds for 6 weeks.  - LSO brace whenever OOB  - Cleared to remove buddy tape for L 5th finger dislocation per Dr. Choe 5/10  - Continue Lovenox for 6 weeks total (through 6/3)  - pain management as below  - follow up:  ·	plastic surgeon Dr. Nell Choe, for facial laceration, L pinky dislocation, L nasal bone and orbital rim fractures.   ·	ortho, Dr. Reji Cardoza, for L clavicle and L pelvic wing fractures  ·	surgery, Dr. Dino Peña, for rib fractures    # Acute Hypoxic Respiratory Failure likely due to Acute B/L rib fractures, and small pneumothorax  - Incentive spirometry. Breathing exercises  - Supplemental Oxygen PRN to keep O2 sats > 92%  - currently stable on RA. 95-99% 5/13    # Elevated Alk Phos likely 2/2 trauma, bone fractures  - Hepatic sono 5/7: suggestive hepatic steatosis. No focal hepatic masses identified. Multiple gallstones, without evidence for gallbladder wall thickening or pericholecystic fluid.  - 5/7 GGT 37, alk phos 431 --> 5/10 Alk Phos 423--> AST  14  /  ALT  21  /  AlkPhos  395<H>  05-13 improving  - CMP 5/17    # H/o SVT  - Continue with toprol XL   - Can follow up in 3 months with EP Dr. Hernández [phone]. (elective ablation)    #R kidney lesion  - F/u Dr. Herrera outpatient or PCP    #Hypothyroidism  - Continue Synthroid 112mcg daily    # Depression  - Psychiatry f/u 5/11 read and appreciated:   ·	Continue Lexapro 10mg  ·	Continue Modafinil 100mg  ·	Continue Ramelteon 8mg    # Pain  - Tylenol  - Lidoderm to L chest wall  - Scheduled Oxycodone 10mg at 8am before therapy to help improve pain control    # GI/Bowel:  - Senna QHS, Miralax Daily    # /Bladder:   - Spangler placed 4/29/21 for urinary retention. TOV 5/6   - persistent retention requiring SC.  consult 5/12 appreciated  ·	- Continue Bethanechol 10 bid  ·	- Start Flomax 0.4 mg daily  ·	- monitor voiding  ·	- straight caths as needed for high PVR  - +cloudy UA PVR >500 ml  ·	Urine Cx pending  ·	continue nitrofurantoin 100 q12 day #2 5/13    # DVT ppx:  - Lovenox  - SCDs    # Case discussed in IDT 5/12/21:  - lives with son and spouse, has HHA for iADLs but able to do ADLs independently. Owns cane and RW.  - ambulates 40 feet with WBQC min-mod assist, 2 step with 1 HR and mod assist, supervision eating, min assist grooming, mod assist UB/LB dressing, mod assist toileting, max assist bathing  - Goal: supervision grooming, bathing LBD, Mark shower transfers, Mark transfers and ambulation  - multiple attempts by team to reach family to provide training unsuccessful. Given cognitive and physical deficits, uncertainty of availability support in community (requires round the clock at this time) will recommend JULI    #LABS  Urine CX  CBC BMP 5/17    ---------------  Outpatient Follow-up (Specialty/Name of physician):  Silvina Herrera  INTERNAL MEDICINE  [address] [place] [address]  Phone: [phone]  Fax: [phone]  Follow Up Time: 1 week    Vikas Aquino)  Neurosurgery  [address] [facility], [address] [place] [address]  Phone: [phone]  Fax: [phone]  Follow Up Time: 2 weeks    Nell Choe)  Plastic Surgery  [address] [facility], [address]  [place], [place] [id number]  Phone: [phone]  Fax: [phone]  Follow Up Time: 1 week    Reji Cardoza)  Orthopaedic Surgery  [address] [facility] [address]  [place] [address]  Phone: [phone]  Fax: [phone]  Follow Up Time: Routine    Dino Peña)  Surgery; Surgical Critical Care  [address] [facility], [facility] [address], [place] [address]  Phone: [phone]  Fax: [phone]  Follow Up Time: Routine    Morgan Hernández; PhD)  Cardiac Electrophysiology; Cardiovascular Disease; Internal Medicine  [facility] - Dept of Cardiology, [address] [place] [address]  Phone: [phone]  Fax: [phone]

## 2021-05-13 NOTE — PROGRESS NOTE ADULT - SUBJECTIVE AND OBJECTIVE BOX
Patient is a 65y old  Female who presents with a chief complaint of SAH and fractures of left-sided ribs, left iliac wing with hematoma, S/P closed reduction of left pinky PIP dislocation after trauma (12 May 2021 16:05)      HPI:  65F PMHx breast cancer s/p mastectomy, HTN, hypothyroidism, not on full anticoagulation/antiplatelets per EMS, presented to [facility] 21as a level 2 trauma activation after a fall from a flight of stairs. Patient only able to say "ow" in trauma bay, unable to provide further history. However per son, normal mentation at baseline. In the trauma bay, airway was intact with bilateral breath sounds, O2 saturation 100% on room air. Initially blood pressure was 140s systolic however on repeat 90s. NS@100 initiated. Secondary significant for left forehead laceration, left chest wall tenderness, left pelvic tenderness.     Ms. Arthur was hypotensive in the trauma bay and was transferred to the Surgical ICU after imaging was obtained. Her injury list is below.  (1) SAH, bilateral: stable on repeat imaging. Seven day course of Keppra for post-traumatic seizure prophylaxis.  (2) left 3-8 and right 7th rib fractures with occult left pneumothorax: pain control  (3) left iliac wing fracture with hematoma: required transfusion early in her ICU course, after which CBC were stable  (4) left forehead laceration s/p suture repair  (5) s/p successful closed reduction of left pinky PIP dislocation with delfin splint  (6) left clavicle fracture  (7) left nasal bone and left orbital rim fractures  (8) sternal fracture  Incidental findings: right renal lesion. Pt's  was notified and copy of the report given to him.     The patient was admitted to Trauma Surgery under SICU care.  Ortho was consulted for L. iliac wing fx, sternal fx, L. clavicle fx- recommended WBAT of the affected left lower extremity with walker, WBAT L shoulder, pendulum exercises okay, no lifting, sling for comfort, Encourage ROM of elbow/wrist/hand, PT/OT consult, NO urgent orthopedic surgical intervention at this time.    Neurosurgery consulted- recommended repeat CTH, which was stable.     PRS was consulted for non displaced left superior orbital rim and non displaced left nasal bone fracture--> No indication for operative fixation of these non displaced fractures; recommend ice to face to aid in swelling and analgesia.    She was placed on hemorrhage watch in the SICU given pelvic fracture and surrounding hematoma.  Behavior health was consulted for delirium.    Hand was consulted for L. pinky PIP dislocation- s/p closed reduction, rec Cont delfin taping for now.    - CTH stable  - Added home lexapro  - Added NS @ 50cc/hr to supplement poor PO intake  - Lactate cleared (1.9)    - Transfused 1u pRBC, Hct 20.9 -> 26.9  - Went into SVT, given adenosine x1 and started on metoprolol 25mg q12hr  - Added oxycodone 5mg q4hrs prn for increased pain control  - PO intake improved, IVL    - EP was consulted for SVT, recc continue lopressor 25mg   Metoprolol PO increased from 25mg q12hrs to 25mg q6hrs.   - Added salt tabs 1g q8hrs and started on NS @ 50cc/hr for hyponatremia  - Spangler discontinued, passed TOV  - Added lovenox  - Cervical collar cleared by confrontational exam     - Patient transferred from SICU to surgical floor in stable condition.  Discharge planning to acute rehab.     Stable from neurologic perspective consistent with mild TBI  Multiple rib fractures - breathing comfortable with multimodal pain control    On day of discharge, the patient was tolerating diet, working with PT well and pain controlled. The patient was medically stable for discharge to acute rehab with instructions for outpatient follow up 21. 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    C Section    Abnormality, eye  h/o left eye vitrectomy    S/P D&amp;C (status post dilation and curettage)    H/O left mastectomy        MEDICATIONS  (STANDING):  bethanechol 10 milliGRAM(s) Oral two times a day  enoxaparin Injectable 40 milliGRAM(s) SubCutaneous every 24 hours  escitalopram 10 milliGRAM(s) Oral daily  folic acid 1 milliGRAM(s) Oral daily  levothyroxine 112 MICROGram(s) Oral daily  lidocaine   Patch 1 Patch Transdermal daily  metoprolol succinate  milliGRAM(s) Oral daily  multivitamin 1 Tablet(s) Oral daily  nitrofurantoin monohydrate/macrocrystals (MACROBID) 100 milliGRAM(s) Oral two times a day  nystatin Powder 1 Application(s) Topical two times a day  oxyCODONE    IR 10 milliGRAM(s) Oral <User Schedule>  polyethylene glycol 3350 17 Gram(s) Oral daily  ramelteon 8 milliGRAM(s) Oral at bedtime  senna 2 Tablet(s) Oral at bedtime  tamsulosin 0.4 milliGRAM(s) Oral at bedtime  zinc oxide 40% Ointment 1 Application(s) Topical two times a day    MEDICATIONS  (PRN):  acetaminophen   Tablet .. 650 milliGRAM(s) Oral every 6 hours PRN Temp greater or equal to 38C (100.4F), Mild Pain (1 - 3)      Allergies    Tapazole (Hives)    Intolerances          VITALS  65y  Vital Signs Last 24 Hrs  T(C): 37.2 (12 May 2021 19:56), Max: 37.2 (12 May 2021 19:56)  T(F): 98.9 (12 May 2021 19:56), Max: 98.9 (12 May 2021 19:56)  HR: 65 (13 May 2021 06:08) (65 - 72)  BP: 122/79 (13 May 2021 06:08) (105/68 - 122/79)  BP(mean): --  RR: 14 (13 May 2021 06:08) (14 - 14)  SpO2: 95% (13 May 2021 06:08) (95% - 99%)  Daily     Daily         RECENT LABS:                          11.4   7.72  )-----------( 242      ( 13 May 2021 06:15 )             35.8     13    142  |  107  |  23  ----------------------------<  113<H>  4.3   |  25  |  0.55    Ca    8.7      13 May 2021 06:15    TPro  6.3  /  Alb  3.0<L>  /  TBili  0.4  /  DBili  x   /  AST  14  /  ALT  21  /  AlkPhos  395<H>  05-13    LIVER FUNCTIONS - ( 13 May 2021 06:15 )  Alb: 3.0 g/dL / Pro: 6.3 g/dL / ALK PHOS: 395 U/L / ALT: 21 U/L / AST: 14 U/L / GGT: x             Urinalysis Basic - ( 12 May 2021 11:15 )    Color: Yellow / Appearance: very cloudy / S.020 / pH: x  Gluc: x / Ketone: Negative  / Bili: Negative / Urobili: Negative   Blood: x / Protein: 15 / Nitrite: Positive   Leuk Esterase: Moderate / RBC: 11-25 /HPF / WBC >50 /HPF   Sq Epi: x / Non Sq Epi: Neg.-Few / Bacteria: Many /HPF          CAPILLARY BLOOD GLUCOSE              Review of Systems:   · Additional ROS	 Patient awake, denies difficulty sleeping . Denies pain currently including back, pelvis, no SOB. although perseverative, yes/no for simple pesonal information appears reliable        Physical Exam:   · Constitutional	detailed exam  · Constitutional Details	no distress  · Constitutional Comments	alert, NAD O x 2  	  	  · Respiratory	detailed exam  · Respiratory Details	breath sounds equal; respirations non-labored; clear to auscultation bilaterally; no wheezes  fair effort reduced BS bases left > right  · Cardiovascular	detailed exam  · Cardiovascular Details	regular rate and rhythm  · Gastrointestinal	detailed exam  · GI Normal	nontender; no distention; bowel sounds normal  	  · Extremities	 bialteral calves soft, NT no erythema or warmth  	no swelling left V finger or wrist/hand  	  	  	               Patient is a 65y old  Female who presents with a chief complaint of SAH and fractures of left-sided ribs, left iliac wing with hematoma, S/P closed reduction of left pinky PIP dislocation after trauma (12 May 2021 16:05)      HPI:  65F PMHx breast cancer s/p mastectomy, HTN, hypothyroidism, not on full anticoagulation/antiplatelets per EMS, presented to [facility] 21as a level 2 trauma activation after a fall from a flight of stairs. Patient only able to say "ow" in trauma bay, unable to provide further history. However per son, normal mentation at baseline. In the trauma bay, airway was intact with bilateral breath sounds, O2 saturation 100% on room air. Initially blood pressure was 140s systolic however on repeat 90s. NS@100 initiated. Secondary significant for left forehead laceration, left chest wall tenderness, left pelvic tenderness.     Ms. Arthur was hypotensive in the trauma bay and was transferred to the Surgical ICU after imaging was obtained. Her injury list is below.  (1) SAH, bilateral: stable on repeat imaging. Seven day course of Keppra for post-traumatic seizure prophylaxis.  (2) left 3-8 and right 7th rib fractures with occult left pneumothorax: pain control  (3) left iliac wing fracture with hematoma: required transfusion early in her ICU course, after which CBC were stable  (4) left forehead laceration s/p suture repair  (5) s/p successful closed reduction of left pinky PIP dislocation with delfin splint  (6) left clavicle fracture  (7) left nasal bone and left orbital rim fractures  (8) sternal fracture  Incidental findings: right renal lesion. Pt's  was notified and copy of the report given to him.     The patient was admitted to Trauma Surgery under SICU care.  Ortho was consulted for L. iliac wing fx, sternal fx, L. clavicle fx- recommended WBAT of the affected left lower extremity with walker, WBAT L shoulder, pendulum exercises okay, no lifting, sling for comfort, Encourage ROM of elbow/wrist/hand, PT/OT consult, NO urgent orthopedic surgical intervention at this time.    Neurosurgery consulted- recommended repeat CTH, which was stable.     PRS was consulted for non displaced left superior orbital rim and non displaced left nasal bone fracture--> No indication for operative fixation of these non displaced fractures; recommend ice to face to aid in swelling and analgesia.    She was placed on hemorrhage watch in the SICU given pelvic fracture and surrounding hematoma.  Behavior health was consulted for delirium.    Hand was consulted for L. pinky PIP dislocation- s/p closed reduction, rec Cont delfin taping for now.    - CTH stable  - Added home lexapro  - Added NS @ 50cc/hr to supplement poor PO intake  - Lactate cleared (1.9)    - Transfused 1u pRBC, Hct 20.9 -> 26.9  - Went into SVT, given adenosine x1 and started on metoprolol 25mg q12hr  - Added oxycodone 5mg q4hrs prn for increased pain control  - PO intake improved, IVL    - EP was consulted for SVT, recc continue lopressor 25mg   Metoprolol PO increased from 25mg q12hrs to 25mg q6hrs.   - Added salt tabs 1g q8hrs and started on NS @ 50cc/hr for hyponatremia  - Spangler discontinued, passed TOV  - Added lovenox  - Cervical collar cleared by confrontational exam     - Patient transferred from SICU to surgical floor in stable condition.  Discharge planning to acute rehab.     Stable from neurologic perspective consistent with mild TBI  Multiple rib fractures - breathing comfortable with multimodal pain control    On day of discharge, the patient was tolerating diet, working with PT well and pain controlled. The patient was medically stable for discharge to acute rehab with instructions for outpatient follow up 21. 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    C Section    Abnormality, eye  h/o left eye vitrectomy    S/P D&amp;C (status post dilation and curettage)    H/O left mastectomy        MEDICATIONS  (STANDING):  bethanechol 10 milliGRAM(s) Oral two times a day  enoxaparin Injectable 40 milliGRAM(s) SubCutaneous every 24 hours  escitalopram 10 milliGRAM(s) Oral daily  folic acid 1 milliGRAM(s) Oral daily  levothyroxine 112 MICROGram(s) Oral daily  lidocaine   Patch 1 Patch Transdermal daily  metoprolol succinate  milliGRAM(s) Oral daily  multivitamin 1 Tablet(s) Oral daily  nitrofurantoin monohydrate/macrocrystals (MACROBID) 100 milliGRAM(s) Oral two times a day  nystatin Powder 1 Application(s) Topical two times a day  oxyCODONE    IR 10 milliGRAM(s) Oral <User Schedule>  polyethylene glycol 3350 17 Gram(s) Oral daily  ramelteon 8 milliGRAM(s) Oral at bedtime  senna 2 Tablet(s) Oral at bedtime  tamsulosin 0.4 milliGRAM(s) Oral at bedtime  zinc oxide 40% Ointment 1 Application(s) Topical two times a day    MEDICATIONS  (PRN):  acetaminophen   Tablet .. 650 milliGRAM(s) Oral every 6 hours PRN Temp greater or equal to 38C (100.4F), Mild Pain (1 - 3)      Allergies    Tapazole (Hives)    Intolerances          VITALS  65y  Vital Signs Last 24 Hrs  T(C): 37.2 (12 May 2021 19:56), Max: 37.2 (12 May 2021 19:56)  T(F): 98.9 (12 May 2021 19:56), Max: 98.9 (12 May 2021 19:56)  HR: 65 (13 May 2021 06:08) (65 - 72)  BP: 122/79 (13 May 2021 06:08) (105/68 - 122/79)  BP(mean): --  RR: 14 (13 May 2021 06:08) (14 - 14)  SpO2: 95% (13 May 2021 06:08) (95% - 99%)  Daily     Daily         RECENT LABS:                          11.4   7.72  )-----------( 242      ( 13 May 2021 06:15 )             35.8     13    142  |  107  |  23  ----------------------------<  113<H>  4.3   |  25  |  0.55    Ca    8.7      13 May 2021 06:15    TPro  6.3  /  Alb  3.0<L>  /  TBili  0.4  /  DBili  x   /  AST  14  /  ALT  21  /  AlkPhos  395<H>  05-13    LIVER FUNCTIONS - ( 13 May 2021 06:15 )  Alb: 3.0 g/dL / Pro: 6.3 g/dL / ALK PHOS: 395 U/L / ALT: 21 U/L / AST: 14 U/L / GGT: x             Urinalysis Basic - ( 12 May 2021 11:15 )    Color: Yellow / Appearance: very cloudy / S.020 / pH: x  Gluc: x / Ketone: Negative  / Bili: Negative / Urobili: Negative   Blood: x / Protein: 15 / Nitrite: Positive   Leuk Esterase: Moderate / RBC: 11-25 /HPF / WBC >50 /HPF   Sq Epi: x / Non Sq Epi: Neg.-Few / Bacteria: Many /HPF          CAPILLARY BLOOD GLUCOSE              Review of Systems:   · Additional ROS	 Patient awake, denies difficulty sleeping . Denies pain currently including back, pelvis, no SOB. although perseverative, yes/no for simple pesonal information appears reliable    PVF 600s this AM    Physical Exam:   · Constitutional	detailed exam  · Constitutional Details	no distress  · Constitutional Comments	alert, NAD O x 2  	  	  · Respiratory	detailed exam  · Respiratory Details	breath sounds equal; respirations non-labored; clear to auscultation bilaterally; no wheezes  fair effort reduced BS bases left > right  · Cardiovascular	detailed exam  · Cardiovascular Details	regular rate and rhythm  · Gastrointestinal	detailed exam  · GI Normal	nontender; no distention; bowel sounds normal  	  · Extremities	 bialteral calves soft, NT no erythema or warmth  	no swelling left V finger or wrist/hand

## 2021-05-14 PROCEDURE — 99232 SBSQ HOSP IP/OBS MODERATE 35: CPT

## 2021-05-14 RX ADMIN — Medication 100 MILLIGRAM(S): at 17:04

## 2021-05-14 RX ADMIN — MODAFINIL 100 MILLIGRAM(S): 200 TABLET ORAL at 06:09

## 2021-05-14 RX ADMIN — ZINC OXIDE 1 APPLICATION(S): 200 OINTMENT TOPICAL at 06:09

## 2021-05-14 RX ADMIN — NYSTATIN CREAM 1 APPLICATION(S): 100000 CREAM TOPICAL at 17:04

## 2021-05-14 RX ADMIN — ENOXAPARIN SODIUM 40 MILLIGRAM(S): 100 INJECTION SUBCUTANEOUS at 06:09

## 2021-05-14 RX ADMIN — LIDOCAINE 1 PATCH: 4 CREAM TOPICAL at 00:14

## 2021-05-14 RX ADMIN — OXYCODONE HYDROCHLORIDE 10 MILLIGRAM(S): 5 TABLET ORAL at 09:15

## 2021-05-14 RX ADMIN — ZINC OXIDE 1 APPLICATION(S): 200 OINTMENT TOPICAL at 17:04

## 2021-05-14 RX ADMIN — Medication 10 MILLIGRAM(S): at 06:09

## 2021-05-14 RX ADMIN — LIDOCAINE 1 PATCH: 4 CREAM TOPICAL at 11:59

## 2021-05-14 RX ADMIN — LIDOCAINE 1 PATCH: 4 CREAM TOPICAL at 18:13

## 2021-05-14 RX ADMIN — Medication 8 MILLIGRAM(S): at 22:01

## 2021-05-14 RX ADMIN — OXYCODONE HYDROCHLORIDE 10 MILLIGRAM(S): 5 TABLET ORAL at 08:50

## 2021-05-14 RX ADMIN — ESCITALOPRAM OXALATE 10 MILLIGRAM(S): 10 TABLET, FILM COATED ORAL at 11:59

## 2021-05-14 RX ADMIN — Medication 1 MILLIGRAM(S): at 11:59

## 2021-05-14 RX ADMIN — SENNA PLUS 2 TABLET(S): 8.6 TABLET ORAL at 22:01

## 2021-05-14 RX ADMIN — Medication 100 MILLIGRAM(S): at 06:09

## 2021-05-14 RX ADMIN — Medication 10 MILLIGRAM(S): at 17:04

## 2021-05-14 RX ADMIN — TAMSULOSIN HYDROCHLORIDE 0.4 MILLIGRAM(S): 0.4 CAPSULE ORAL at 22:01

## 2021-05-14 RX ADMIN — LIDOCAINE 1 PATCH: 4 CREAM TOPICAL at 23:00

## 2021-05-14 RX ADMIN — POLYETHYLENE GLYCOL 3350 17 GRAM(S): 17 POWDER, FOR SOLUTION ORAL at 11:59

## 2021-05-14 RX ADMIN — Medication 1 TABLET(S): at 11:59

## 2021-05-14 RX ADMIN — NYSTATIN CREAM 1 APPLICATION(S): 100000 CREAM TOPICAL at 06:08

## 2021-05-14 RX ADMIN — Medication 112 MICROGRAM(S): at 06:09

## 2021-05-14 NOTE — PROGRESS NOTE ADULT - ASSESSMENT
Rehab s/p CVA. Retention / neurogenic bladder. On caths.    - continue Flomax / urecholine  - continue caths as needed  - will need Spangler when discharged if retention persists

## 2021-05-14 NOTE — PROGRESS NOTE ADULT - SUBJECTIVE AND OBJECTIVE BOX
Rehab s/p CVA.    On caths for retention. On Flomax / urecholine. No voids. BM's normal.      ROS  General: no fever or chills    VITAL SIGNS  Vital Signs Last 24 Hrs  T(C): 36.7 (14 May 2021 07:41), Max: 37.1 (13 May 2021 20:21)  T(F): 98 (14 May 2021 07:41), Max: 98.8 (13 May 2021 20:21)  HR: 63 (14 May 2021 07:41) (62 - 72)  BP: 110/74       PHYSICAL EXAM:  General: appears comfortable this AM      LABS:                        11.4   7.72  )-----------( 242      ( 13 May 2021 06:15 )             35.8     05-13    142  |  107  |  23  ----------------------------<  113<H>  4.3   |  25  |  0.55        Prior notes/chart reviewed.

## 2021-05-14 NOTE — PROGRESS NOTE ADULT - ASSESSMENT
JONATHAN CHILD is a 65y with PMHx breast Ca S/P mastectomy, HTN, hypothyroidism who presented to [facility] 4/19/21 s/p fall down a flight of stairs with bilateral frontoparietal SAH; L nasal bone and L orbital rim fractures; sternal fracture; L 3-8, R 7th rib fractures;  L pneumothorax, L iliac wing fracture with hematoma S/P 1U pRBCs; left clavicle fx, left V finger PIP dislocation s/p CR. She is WBAT Left LE, NWB Left UE.     #SAH  - Avoid NSAIDs  - monitor neurological status, stable  - F/u neurosurgery, Dr. Vikas Aquino, upon discharge  - Precautions: falls, sternal , WB as above, AMS, cardiac, h/o breast CA    # Multiple fractures. WBAT LLE, NWB LUE   - Cont comprehensive rehab program, PT/OT/SLP 3 hours a day, 5 days a week.  - cleared for ROM exercises for L elbow, wrist, hand.  L shoulder pendulum exercises. Sling to LUE for comfort. No lifting more than 1-3 pounds for 6 weeks.  - LSO brace whenever OOB. LSO was lost overnight 5/14. Contacted ortho for replacement LSO.   - Cleared to remove buddy tape for L 5th finger dislocation per Dr. Choe 5/10  - Continue Lovenox for 6 weeks total (through 6/3)  - pain management as below  - follow up:  ·	plastic surgeon Dr. Nell Choe, for facial laceration, L pinky dislocation, L nasal bone and orbital rim fractures.   ·	ortho, Dr. Reji Cardoza, for L clavicle and L pelvic wing fractures  ·	surgery, Dr. Dino Peña, for rib fractures    # Acute Hypoxic Respiratory Failure likely due to Acute B/L rib fractures, and small pneumothorax  - Incentive spirometry. Breathing exercises  - Supplemental Oxygen PRN to keep O2 sats > 92%  - currently stable on RA. 95-99% 5/13    # Elevated Alk Phos likely 2/2 trauma, bone fractures  - Hepatic sono 5/7: suggestive hepatic steatosis. No focal hepatic masses identified. Multiple gallstones, without evidence for gallbladder wall thickening or pericholecystic fluid.  - 5/7 GGT 37, alk phos 431 --> 5/10 Alk Phos 423--> AST  14  /  ALT  21  /  AlkPhos  395<H>  05-13 improving  - CMP 5/17    # H/o SVT  - Continue with toprol XL   - Can follow up in 3 months with EP Dr. Hernández [phone]. (elective ablation)    #R kidney lesion  - F/u Dr. Herrera outpatient or PCP    #Hypothyroidism  - Continue Synthroid 112mcg daily    # Depression  - Psychiatry f/u 5/11 read and appreciated:   ·	Continue Lexapro 10mg  ·	Continue Modafinil 100mg  ·	Continue Ramelteon 8mg    # Pain  - Tylenol  - Lidoderm to L chest wall  - Scheduled Oxycodone 10mg at 8am before therapy to help improve pain control    # GI/Bowel:  - Senna QHS, Miralax Daily    # /Bladder:   - Avila placed 4/29/21 for urinary retention. TOV 5/6   - persistent retention requiring SC.  consult 5/14 appreciated. May require avila upon discharge if retention persists.  ·	- Continue Bethanechol 10 bid  ·	- Continue Flomax 0.4 mg daily  ·	- monitor voiding  ·	- straight caths as needed for high PVR  - +cloudy UA PVR >500 ml  ·	Urine Cx: E. coli  ·	continue nitrofurantoin 100 q12 day #2 5/13    #Diet  - 1:1 supervision for meals    # DVT ppx:  - Lovenox  - SCDs    # Case discussed in IDT 5/12/21:  - lives with son and spouse, has HHA for iADLs but able to do ADLs independently. Owns cane and RW.  - ambulates 40 feet with WBQC min-mod assist, 2 step with 1 HR and mod assist, supervision eating, min assist grooming, mod assist UB/LB dressing, mod assist toileting, max assist bathing  - Goal: supervision grooming, bathing LBD, Mark shower transfers, Mark transfers and ambulation  - multiple attempts by team to reach family to provide training unsuccessful. Given cognitive and physical deficits, uncertainty of availability support in community (requires round the clock at this time) will recommend JULI    #Family update:  - Provided update 5/13/21 to . All questions answered.    #LABS  Urine CX sensitivities  CBC BMP 5/17    ---------------  Outpatient Follow-up (Specialty/Name of physician):  Silvina Herrera  INTERNAL MEDICINE  [address] [place] [address]  Phone: [phone]  Fax: [phone]  Follow Up Time: 1 week    Vikas Aquino)  Neurosurgery  [address] [facility], [address] [place] [address]  Phone: [phone]  Fax: [phone]  Follow Up Time: 2 weeks    Nell Choe)  Plastic Surgery  [address] [facility], [address]  [place], [place] [address]  Phone: [phone]  Fax: [phone]  Follow Up Time: 1 week    Reji Cardoza)  Orthopaedic Surgery  [address] [facility] [address]  [place] [address]  Phone: [phone]  Fax: [phone]  Follow Up Time: Routine    Dino Peña)  Surgery; Surgical Critical Care  [address] [facility], [address] [address]  Phone: [phone]  Fax: [phone]  Follow Up Time: Routine    Morgan Hernández; PhD)  Cardiac Electrophysiology; Cardiovascular Disease; Internal Medicine  [facility] - Dept of Cardiology, [address] [place] [address]  Phone: [phone]  Fax: [phone]   JONATHAN CHILD is a 65y with PMHx breast Ca S/P mastectomy, HTN, hypothyroidism who presented to [facility] 4/19/21 s/p fall down a flight of stairs with bilateral frontoparietal SAH; L nasal bone and L orbital rim fractures; sternal fracture; L 3-8, R 7th rib fractures;  L pneumothorax, L iliac wing fracture with hematoma S/P 1U pRBCs; left clavicle fx, left V finger PIP dislocation s/p CR. She is WBAT Left LE, NWB Left UE.     #SAH  - Avoid NSAIDs  - monitor neurological status, stable  - F/u neurosurgery, Dr. Vikas Aquino, upon discharge  - Precautions: falls, sternal , WB as above, AMS, cardiac, h/o breast CA    # Multiple fractures. WBAT LLE, NWB LUE   - Cont comprehensive rehab program, PT/OT/SLP 3 hours a day, 5 days a week.  - cleared for ROM exercises for L elbow, wrist, hand.  L shoulder pendulum exercises. Sling to LUE for comfort. No lifting more than 1-3 pounds for 6 weeks.  - LSO brace whenever OOB. LSO was lost overnight 5/14. Contacted ortho for replacement LSO. In bed therapy until LSO replaced, discussed with patient and staff   - Cleared to remove buddy tape for L 5th finger dislocation per Dr. Choe 5/10  - Continue Lovenox for 6 weeks total (through 6/3)  - pain management as below  - follow up:  ·	plastic surgeon Dr. Nell Choe, for facial laceration, L pinky dislocation, L nasal bone and orbital rim fractures.   ·	ortho, Dr. Reji Cardoza, for L clavicle and L pelvic wing fractures  ·	surgery, Dr. Dino Peña, for rib fractures    # Acute Hypoxic Respiratory Failure likely due to Acute B/L rib fractures, and small pneumothorax  - Incentive spirometry. Breathing exercises  - Supplemental Oxygen PRN to keep O2 sats > 92%  - currently stable on RA. 95-97% 5/14  - left 'breast pain' likely due to rib fx /possible atelectasis, +reproducible. continue breathing exercises, lidoderm patch    # Elevated Alk Phos likely 2/2 trauma, bone fractures  - Hepatic sono 5/7: suggestive hepatic steatosis. No focal hepatic masses identified. Multiple gallstones, without evidence for gallbladder wall thickening or pericholecystic fluid.  - 5/7 GGT 37, alk phos 431 --> 5/10 Alk Phos 423--> AST  14  /  ALT  21  /  AlkPhos  395<H>  05-13 improving  - CMP 5/17    # H/o SVT  - Continue with toprol XL   - Can follow up in 3 months with EP Dr. Hernández [phone]. (elective ablation)    #R kidney lesion  - F/u Dr. Herrera outpatient or PCP    #Hypothyroidism  - Continue Synthroid 112mcg daily    # Depression  - Psychiatry f/u 5/11 read and appreciated:   ·	Continue Lexapro 10mg  ·	Continue Modafinil 100mg  ·	Continue Ramelteon 8mg    # Pain  - Tylenol  - Lidoderm to L chest wall  - Scheduled Oxycodone 10mg at 8am before therapy to help improve pain control    # GI/Bowel:  - Senna QHS, Miralax Daily    # /Bladder:   - Avila placed 4/29/21 for urinary retention. TOV 5/6   -  consult 5/14 appreciated. May require avila upon discharge if retention persists.  ·	Continue Bethanechol 10 bid  ·	Continue Flomax 0.4 mg daily  ·	monitor voiding. straight caths as needed for high PVR  - +cloudy UA PVR >500 ml  ·	Urine Cx: E. coli  ·	continue nitrofurantoin 100 q12 day #3 5/14    # Diet  - 1:1 supervision for meals    # DVT ppx:  - Lovenox  - SCDs    # Case discussed in IDT 5/12/21:  - lives with son and spouse, has HHA for iADLs but able to do ADLs independently. Owns cane and RW.  - ambulates 40 feet with WBQC min-mod assist, 2 step with 1 HR and mod assist, supervision eating, min assist grooming, mod assist UB/LB dressing, mod assist toileting, max assist bathing  - Goal: supervision grooming, bathing LBD, Mark shower transfers, Mark transfers and ambulation  - multiple attempts by team to reach family to provide training unsuccessful. Given cognitive and physical deficits, uncertainty of availability support in community (requires round the clock at this time) will recommend JULI    # Family update:  - Provided update 5/13/21 to . All questions answered.    #LABS  Urine CX sensitivities  LSO  CBC BMP 5/17    ---------------  Outpatient Follow-up (Specialty/Name of physician):  Silvina Herrera  INTERNAL MEDICINE  [address] [place] [address]  Phone: [phone]  Fax: [phone]  Follow Up Time: 1 week    Vikas Aquino)  Neurosurgery  [address] [facility], [address] [place] [address]  Phone: [phone]  Fax: [phone]  Follow Up Time: 2 weeks    Nell Choe)  Plastic Surgery  [address] [facility], [address]  [place], [place] [id number]  Phone: [phone]  Fax: [phone]  Follow Up Time: 1 week    Reji Cardoza)  Orthopaedic Surgery  [address] [facility] [address]  [place] [address]  Phone: [phone]  Fax: [phone]  Follow Up Time: Routine    Dino Peña)  Surgery; Surgical Critical Care  [address] [facility], [facility] [address] [address]  Phone: [phone]  Fax: [phone]  Follow Up Time: Routine    Morgan Hernández; PhD)  Cardiac Electrophysiology; Cardiovascular Disease; Internal Medicine  [facility] - Dept of Cardiology, [address] [place] [address]  Phone: [phone]  Fax: [phone]

## 2021-05-14 NOTE — CHART NOTE - NSCHARTNOTEFT_GEN_A_CORE
Call received from surgical PA to replce lost LSO that was provided approximately on 4/29/21.  Pt remeasured and LSO left at bedside, RN present during patient  contact.  Follow up if needed      John SCHMITT  [phone]

## 2021-05-14 NOTE — PROGRESS NOTE ADULT - SUBJECTIVE AND OBJECTIVE BOX
Patient is a 65y old  Female who presents with a chief complaint of SAH and fractures of left-sided ribs, left iliac wing with hematoma, S/P closed reduction of left pinky PIP dislocation after trauma (12 May 2021 16:05)      HPI:  65F PMHx breast cancer s/p mastectomy, HTN, hypothyroidism, not on full anticoagulation/antiplatelets per EMS, presented to [facility] 21as a level 2 trauma activation after a fall from a flight of stairs. Patient only able to say "ow" in trauma bay, unable to provide further history. However per son, normal mentation at baseline. In the trauma bay, airway was intact with bilateral breath sounds, O2 saturation 100% on room air. Initially blood pressure was 140s systolic however on repeat 90s. NS@100 initiated. Secondary significant for left forehead laceration, left chest wall tenderness, left pelvic tenderness.     Ms. Arthur was hypotensive in the trauma bay and was transferred to the Surgical ICU after imaging was obtained. Her injury list is below.  (1) SAH, bilateral: stable on repeat imaging. Seven day course of Keppra for post-traumatic seizure prophylaxis.  (2) left 3-8 and right 7th rib fractures with occult left pneumothorax: pain control  (3) left iliac wing fracture with hematoma: required transfusion early in her ICU course, after which CBC were stable  (4) left forehead laceration s/p suture repair  (5) s/p successful closed reduction of left pinky PIP dislocation with delfin splint  (6) left clavicle fracture  (7) left nasal bone and left orbital rim fractures  (8) sternal fracture  Incidental findings: right renal lesion. Pt's  was notified and copy of the report given to him.     The patient was admitted to Trauma Surgery under SICU care.  Ortho was consulted for L. iliac wing fx, sternal fx, L. clavicle fx- recommended WBAT of the affected left lower extremity with walker, WBAT L shoulder, pendulum exercises okay, no lifting, sling for comfort, Encourage ROM of elbow/wrist/hand, PT/OT consult, NO urgent orthopedic surgical intervention at this time.    Neurosurgery consulted- recommended repeat CTH, which was stable.     PRS was consulted for non displaced left superior orbital rim and non displaced left nasal bone fracture--> No indication for operative fixation of these non displaced fractures; recommend ice to face to aid in swelling and analgesia.    She was placed on hemorrhage watch in the SICU given pelvic fracture and surrounding hematoma.  Behavior health was consulted for delirium.    Hand was consulted for L. pinky PIP dislocation- s/p closed reduction, rec Cont delfin taping for now.    - CTH stable  - Added home lexapro  - Added NS @ 50cc/hr to supplement poor PO intake  - Lactate cleared (1.9)    - Transfused 1u pRBC, Hct 20.9 -> 26.9  - Went into SVT, given adenosine x1 and started on metoprolol 25mg q12hr  - Added oxycodone 5mg q4hrs prn for increased pain control  - PO intake improved, IVL    - EP was consulted for SVT, recc continue lopressor 25mg   Metoprolol PO increased from 25mg q12hrs to 25mg q6hrs.   - Added salt tabs 1g q8hrs and started on NS @ 50cc/hr for hyponatremia  - Spangler discontinued, passed TOV  - Added lovenox  - Cervical collar cleared by confrontational exam     - Patient transferred from SICU to surgical floor in stable condition.  Discharge planning to acute rehab.     Stable from neurologic perspective consistent with mild TBI  Multiple rib fractures - breathing comfortable with multimodal pain control    On day of discharge, the patient was tolerating diet, working with PT well and pain controlled. The patient was medically stable for discharge to acute rehab with instructions for outpatient follow up 21. 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    C Section    Abnormality, eye  h/o left eye vitrectomy    S/P D&amp;C (status post dilation and curettage)    H/O left mastectomy        MEDICATIONS  (STANDING):  bethanechol 10 milliGRAM(s) Oral two times a day  enoxaparin Injectable 40 milliGRAM(s) SubCutaneous every 24 hours  escitalopram 10 milliGRAM(s) Oral daily  folic acid 1 milliGRAM(s) Oral daily  levothyroxine 112 MICROGram(s) Oral daily  lidocaine   Patch 1 Patch Transdermal daily  metoprolol succinate  milliGRAM(s) Oral daily  modafinil 100 milliGRAM(s) Oral <User Schedule>  multivitamin 1 Tablet(s) Oral daily  nitrofurantoin monohydrate/macrocrystals (MACROBID) 100 milliGRAM(s) Oral two times a day  nystatin Powder 1 Application(s) Topical two times a day  oxyCODONE    IR 10 milliGRAM(s) Oral <User Schedule>  polyethylene glycol 3350 17 Gram(s) Oral daily  ramelteon 8 milliGRAM(s) Oral at bedtime  senna 2 Tablet(s) Oral at bedtime  tamsulosin 0.4 milliGRAM(s) Oral at bedtime  zinc oxide 40% Ointment 1 Application(s) Topical two times a day    MEDICATIONS  (PRN):  acetaminophen   Tablet .. 650 milliGRAM(s) Oral every 6 hours PRN Temp greater or equal to 38C (100.4F), Mild Pain (1 - 3)        Allergies    Tapazole (Hives)    Intolerances        Vital Signs Last 24 Hrs  T(C): 36.7 (14 May 2021 07:41), Max: 37.1 (13 May 2021 20:21)  T(F): 98 (14 May 2021 07:41), Max: 98.8 (13 May 2021 20:21)  HR: 63 (14 May 2021 07:41) (63 - 72)  BP: 110/74 (14 May 2021 07:41) (110/74 - 125/81)  BP(mean): --  RR: 16 (14 May 2021 07:41) (14 - 16)  SpO2: 97% (14 May 2021 07:41) (95% - 97%)        RECENT LABS:                          11.4   7.72  )-----------( 242      ( 13 May 2021 06:15 )             35.8     05-13    142  |  107  |  23  ----------------------------<  113<H>  4.3   |  25  |  0.55    Ca    8.7      13 May 2021 06:15    TPro  6.3  /  Alb  3.0<L>  /  TBili  0.4  /  DBili  x   /  AST  14  /  ALT  21  /  AlkPhos  395<H>  05-13    LIVER FUNCTIONS - ( 13 May 2021 06:15 )  Alb: 3.0 g/dL / Pro: 6.3 g/dL / ALK PHOS: 395 U/L / ALT: 21 U/L / AST: 14 U/L / GGT: x             Urinalysis Basic - ( 12 May 2021 11:15 )    Color: Yellow / Appearance: very cloudy / S.020 / pH: x  Gluc: x / Ketone: Negative  / Bili: Negative / Urobili: Negative   Blood: x / Protein: 15 / Nitrite: Positive   Leuk Esterase: Moderate / RBC: 11-25 /HPF / WBC >50 /HPF   Sq Epi: x / Non Sq Epi: Neg.-Few / Bacteria: Many /HPF      Culture - Urine (collected 12 May 2021 13:17)  Source: .Urine Catheterized  Preliminary Report (14 May 2021 01:54):    >100,000 CFU/ml Escherichia coli          Review of Systems:   · Additional ROS	Patient awake, denies difficulty sleeping. Denies pain currently including back, pelvis, no SOB. She was upset that her LSO was lost overnight. Mainly answers in yes/no with some delay. C/o L sided pain under the breast, more so over the rib.    , straight cath 540 this AM    Physical Exam:   · Constitutional	detailed exam  · Constitutional Details	no distress  · Constitutional Comments	alert, NAD O x 2  	  	  · Respiratory	detailed exam  · Respiratory Details	breath sounds equal; respirations non-labored; clear to auscultation bilaterally; no wheezes  fair effort reduced BS bases left > right  · Cardiovascular	detailed exam  · Cardiovascular Details	regular rate and rhythm  · Gastrointestinal	detailed exam  · GI Normal	nontender; no distention; bowel sounds normal  	  · Extremities	 bialteral calves soft, NT no erythema or warmth  	no swelling left V finger or wrist/hand  	  	  	               Patient is a 65y old  Female who presents with a chief complaint of SAH and fractures of left-sided ribs, left iliac wing with hematoma, S/P closed reduction of left pinky PIP dislocation after trauma (12 May 2021 16:05)      HPI:  65F PMHx breast cancer s/p mastectomy, HTN, hypothyroidism, not on full anticoagulation/antiplatelets per EMS, presented to [facility] 21as a level 2 trauma activation after a fall from a flight of stairs. Patient only able to say "ow" in trauma bay, unable to provide further history. However per son, normal mentation at baseline. In the trauma bay, airway was intact with bilateral breath sounds, O2 saturation 100% on room air. Initially blood pressure was 140s systolic however on repeat 90s. NS@100 initiated. Secondary significant for left forehead laceration, left chest wall tenderness, left pelvic tenderness.     Ms. Arthur was hypotensive in the trauma bay and was transferred to the Surgical ICU after imaging was obtained. Her injury list is below.  (1) SAH, bilateral: stable on repeat imaging. Seven day course of Keppra for post-traumatic seizure prophylaxis.  (2) left 3-8 and right 7th rib fractures with occult left pneumothorax: pain control  (3) left iliac wing fracture with hematoma: required transfusion early in her ICU course, after which CBC were stable  (4) left forehead laceration s/p suture repair  (5) s/p successful closed reduction of left pinky PIP dislocation with delfin splint  (6) left clavicle fracture  (7) left nasal bone and left orbital rim fractures  (8) sternal fracture  Incidental findings: right renal lesion. Pt's  was notified and copy of the report given to him.     The patient was admitted to Trauma Surgery under SICU care.  Ortho was consulted for L. iliac wing fx, sternal fx, L. clavicle fx- recommended WBAT of the affected left lower extremity with walker, WBAT L shoulder, pendulum exercises okay, no lifting, sling for comfort, Encourage ROM of elbow/wrist/hand, PT/OT consult, NO urgent orthopedic surgical intervention at this time.    Neurosurgery consulted- recommended repeat CTH, which was stable.     PRS was consulted for non displaced left superior orbital rim and non displaced left nasal bone fracture--> No indication for operative fixation of these non displaced fractures; recommend ice to face to aid in swelling and analgesia.    She was placed on hemorrhage watch in the SICU given pelvic fracture and surrounding hematoma.  Behavior health was consulted for delirium.    Hand was consulted for L. pinky PIP dislocation- s/p closed reduction, rec Cont delfin taping for now.    - CTH stable  - Added home lexapro  - Added NS @ 50cc/hr to supplement poor PO intake  - Lactate cleared (1.9)    - Transfused 1u pRBC, Hct 20.9 -> 26.9  - Went into SVT, given adenosine x1 and started on metoprolol 25mg q12hr  - Added oxycodone 5mg q4hrs prn for increased pain control  - PO intake improved, IVL    - EP was consulted for SVT, recc continue lopressor 25mg   Metoprolol PO increased from 25mg q12hrs to 25mg q6hrs.   - Added salt tabs 1g q8hrs and started on NS @ 50cc/hr for hyponatremia  - Spangler discontinued, passed TOV  - Added lovenox  - Cervical collar cleared by confrontational exam     - Patient transferred from SICU to surgical floor in stable condition.  Discharge planning to acute rehab.     Stable from neurologic perspective consistent with mild TBI  Multiple rib fractures - breathing comfortable with multimodal pain control    On day of discharge, the patient was tolerating diet, working with PT well and pain controlled. The patient was medically stable for discharge to acute rehab with instructions for outpatient follow up 21. 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    C Section    Abnormality, eye  h/o left eye vitrectomy    S/P D&amp;C (status post dilation and curettage)    H/O left mastectomy        MEDICATIONS  (STANDING):  bethanechol 10 milliGRAM(s) Oral two times a day  enoxaparin Injectable 40 milliGRAM(s) SubCutaneous every 24 hours  escitalopram 10 milliGRAM(s) Oral daily  folic acid 1 milliGRAM(s) Oral daily  levothyroxine 112 MICROGram(s) Oral daily  lidocaine   Patch 1 Patch Transdermal daily  metoprolol succinate  milliGRAM(s) Oral daily  modafinil 100 milliGRAM(s) Oral <User Schedule>  multivitamin 1 Tablet(s) Oral daily  nitrofurantoin monohydrate/macrocrystals (MACROBID) 100 milliGRAM(s) Oral two times a day  nystatin Powder 1 Application(s) Topical two times a day  oxyCODONE    IR 10 milliGRAM(s) Oral <User Schedule>  polyethylene glycol 3350 17 Gram(s) Oral daily  ramelteon 8 milliGRAM(s) Oral at bedtime  senna 2 Tablet(s) Oral at bedtime  tamsulosin 0.4 milliGRAM(s) Oral at bedtime  zinc oxide 40% Ointment 1 Application(s) Topical two times a day    MEDICATIONS  (PRN):  acetaminophen   Tablet .. 650 milliGRAM(s) Oral every 6 hours PRN Temp greater or equal to 38C (100.4F), Mild Pain (1 - 3)        Allergies    Tapazole (Hives)    Intolerances        Vital Signs Last 24 Hrs  T(C): 36.7 (14 May 2021 07:41), Max: 37.1 (13 May 2021 20:21)  T(F): 98 (14 May 2021 07:41), Max: 98.8 (13 May 2021 20:21)  HR: 63 (14 May 2021 07:41) (63 - 72)  BP: 110/74 (14 May 2021 07:41) (110/74 - 125/81)  BP(mean): --  RR: 16 (14 May 2021 07:41) (14 - 16)  SpO2: 97% (14 May 2021 07:41) (95% - 97%)        RECENT LABS:                          11.4   7.72  )-----------( 242      ( 13 May 2021 06:15 )             35.8     05-13    142  |  107  |  23  ----------------------------<  113<H>  4.3   |  25  |  0.55    Ca    8.7      13 May 2021 06:15    TPro  6.3  /  Alb  3.0<L>  /  TBili  0.4  /  DBili  x   /  AST  14  /  ALT  21  /  AlkPhos  395<H>  05-13    LIVER FUNCTIONS - ( 13 May 2021 06:15 )  Alb: 3.0 g/dL / Pro: 6.3 g/dL / ALK PHOS: 395 U/L / ALT: 21 U/L / AST: 14 U/L / GGT: x             Urinalysis Basic - ( 12 May 2021 11:15 )    Color: Yellow / Appearance: very cloudy / S.020 / pH: x  Gluc: x / Ketone: Negative  / Bili: Negative / Urobili: Negative   Blood: x / Protein: 15 / Nitrite: Positive   Leuk Esterase: Moderate / RBC: 11-25 /HPF / WBC >50 /HPF   Sq Epi: x / Non Sq Epi: Neg.-Few / Bacteria: Many /HPF      Culture - Urine (collected 12 May 2021 13:17)  Source: .Urine Catheterized  Preliminary Report (14 May 2021 01:54):    >100,000 CFU/ml Escherichia coli          Review of Systems:   · Additional ROS	Patient awake, denies difficulty sleeping. Denies pain currently including back, pelvis, no SOB. She was upset that her LSO was lost overnight. Mainly answers in yes/no with some delay. C/o L sided pain under the breast, when asked where, points to underside breast and along rib    , straight cath 540 this AM    Physical Exam:   · Constitutional	detailed exam  · Constitutional Details	no distress  · Constitutional Comments	alert, NAD O x 2  	  	  · Respiratory	detailed exam  · Respiratory Details	breath sounds equal; respirations non-labored; clear to auscultation bilaterally; no wheezes  fair effort reduced BS bases left > right  · Cardiovascular	detailed exam  · Cardiovascular Details	regular rate and rhythm  · Gastrointestinal	detailed exam  · GI Normal	nontender; no distention; bowel sounds normal  	  · Extremities	 bialteral calves soft, NT no erythema or warmth  	no swelling left V finger or wrist/hand  	  · Skin	no irritation/breakdown/ecchymoses underside breast, no puckering or induration or TTP  +TTP along anterior and lateral ribs, mild

## 2021-05-15 PROCEDURE — 99232 SBSQ HOSP IP/OBS MODERATE 35: CPT

## 2021-05-15 RX ORDER — CEFTRIAXONE 500 MG/1
1000 INJECTION, POWDER, FOR SOLUTION INTRAMUSCULAR; INTRAVENOUS EVERY 24 HOURS
Refills: 0 | Status: COMPLETED | OUTPATIENT
Start: 2021-05-15 | End: 2021-05-17

## 2021-05-15 RX ORDER — SACCHAROMYCES BOULARDII 250 MG
250 POWDER IN PACKET (EA) ORAL
Refills: 0 | Status: COMPLETED | OUTPATIENT
Start: 2021-05-15 | End: 2021-05-25

## 2021-05-15 RX ADMIN — NYSTATIN CREAM 1 APPLICATION(S): 100000 CREAM TOPICAL at 05:47

## 2021-05-15 RX ADMIN — CEFTRIAXONE 100 MILLIGRAM(S): 500 INJECTION, POWDER, FOR SOLUTION INTRAMUSCULAR; INTRAVENOUS at 15:08

## 2021-05-15 RX ADMIN — ZINC OXIDE 1 APPLICATION(S): 200 OINTMENT TOPICAL at 05:47

## 2021-05-15 RX ADMIN — OXYCODONE HYDROCHLORIDE 10 MILLIGRAM(S): 5 TABLET ORAL at 08:23

## 2021-05-15 RX ADMIN — Medication 112 MICROGRAM(S): at 05:46

## 2021-05-15 RX ADMIN — Medication 1 TABLET(S): at 12:36

## 2021-05-15 RX ADMIN — Medication 10 MILLIGRAM(S): at 05:46

## 2021-05-15 RX ADMIN — LIDOCAINE 1 PATCH: 4 CREAM TOPICAL at 12:35

## 2021-05-15 RX ADMIN — TAMSULOSIN HYDROCHLORIDE 0.4 MILLIGRAM(S): 0.4 CAPSULE ORAL at 21:06

## 2021-05-15 RX ADMIN — NYSTATIN CREAM 1 APPLICATION(S): 100000 CREAM TOPICAL at 18:04

## 2021-05-15 RX ADMIN — Medication 10 MILLIGRAM(S): at 18:03

## 2021-05-15 RX ADMIN — ZINC OXIDE 1 APPLICATION(S): 200 OINTMENT TOPICAL at 18:05

## 2021-05-15 RX ADMIN — Medication 8 MILLIGRAM(S): at 21:06

## 2021-05-15 RX ADMIN — Medication 100 MILLIGRAM(S): at 05:46

## 2021-05-15 RX ADMIN — LIDOCAINE 1 PATCH: 4 CREAM TOPICAL at 18:24

## 2021-05-15 RX ADMIN — ESCITALOPRAM OXALATE 10 MILLIGRAM(S): 10 TABLET, FILM COATED ORAL at 12:36

## 2021-05-15 RX ADMIN — ENOXAPARIN SODIUM 40 MILLIGRAM(S): 100 INJECTION SUBCUTANEOUS at 05:48

## 2021-05-15 RX ADMIN — MODAFINIL 100 MILLIGRAM(S): 200 TABLET ORAL at 06:33

## 2021-05-15 RX ADMIN — Medication 250 MILLIGRAM(S): at 18:03

## 2021-05-15 RX ADMIN — OXYCODONE HYDROCHLORIDE 10 MILLIGRAM(S): 5 TABLET ORAL at 07:46

## 2021-05-15 RX ADMIN — Medication 1 MILLIGRAM(S): at 12:36

## 2021-05-15 RX ADMIN — SENNA PLUS 2 TABLET(S): 8.6 TABLET ORAL at 21:06

## 2021-05-15 NOTE — PROGRESS NOTE ADULT - SUBJECTIVE AND OBJECTIVE BOX
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    C Section    Abnormality, eye  h/o left eye vitrectomy    S/P D&amp;C (status post dilation and curettage)    H/O left mastectomy    VITALS  Vital Signs Last 24 Hrs  T(C): 36.8 (14 May 2021 20:24), Max: 36.8 (14 May 2021 20:24)  T(F): 98.3 (14 May 2021 20:24), Max: 98.3 (14 May 2021 20:24)  HR: 73 (15 May 2021 07:47) (64 - 76)  BP: 100/64 (15 May 2021 07:47) (100/64 - 125/74)  BP(mean): --  RR: 16 (15 May 2021 07:47) (14 - 16)  SpO2: 95% (15 May 2021 07:47) (94% - 95%)      PHYSICAL EXAM  Constitutional - NAD, Comfortable  HEENT - NCAT, EOMI  Neck - Supple, No limited ROM  Chest - CTA bilaterally  Cardiovascular - RRR, S1S2  Abdomen - Soft, NTND  Extremities  - No calf tenderness   Neurologic Exam -                    Cognitive - Awake, Alert     No new focal deficits                  RECENT LABS        Culture - Urine (05.12.21 @ 13:17)   - Piperacillin/Tazobactam: S <=8   - Tigecycline: S <=2   - Tobramycin: S 4   - Trimethoprim/Sulfamethoxazole: R >2/38   - Ampicillin/Sulbactam: S 8/4 Enterobacter, Citrobacter, and Serratia may develop resistance during prolonged therapy (3-4 days)   - Aztreonam: S <=4   - Cefazolin: S <=2 (MIC_CL_COM_ENTERIC_[id number]) For uncomplicated UTI with K. pneumoniae, E. coli, or P. mirablis: CHATA <=16 is sensitive and CHATA >=32 is resistant. This also predicts results for oral agents cefaclor, cefdinir, cefpodoxime, cefprozil, cefuroxime axetil, cephalexin and locarbef for uncomplicated UTI. Note that some isolates may be susceptible to these agents while testing resistant to cefazolin.   - Cefepime: S <=2   - Cefoxitin: S <=8   - Ceftriaxone: S <=1 Enterobacter, Citrobacter, and Serratia may develop resistance during prolonged therapy   - Ciprofloxacin: S <=0.25   - Amikacin: S <=16   - Amoxicillin/Clavulanic Acid: S <=8/4   - Ampicillin: R >16 These ampicillin results predict results for amoxicillin   - Ertapenem: S <=0.5   - Gentamicin: R >8   - Imipenem: S <=1   - Levofloxacin: S <=0.5   - Meropenem: S <=1   - Nitrofurantoin: S <=32 Should not be used to treat pyelonephritis   Specimen Source: .Urine Catheterized   Culture Results:   >100,000 CFU/ml Escherichia coli   Organism Identification: Escherichia coli   Organism: Escherichia coli   Method Type: CHATA         CURRENT MEDICATIONS    MEDICATIONS  (STANDING):  bethanechol 10 milliGRAM(s) Oral two times a day  cefTRIAXone   IVPB 1000 milliGRAM(s) IV Intermittent every 24 hours  enoxaparin Injectable 40 milliGRAM(s) SubCutaneous every 24 hours  escitalopram 10 milliGRAM(s) Oral daily  folic acid 1 milliGRAM(s) Oral daily  levothyroxine 112 MICROGram(s) Oral daily  lidocaine   Patch 1 Patch Transdermal daily  metoprolol succinate  milliGRAM(s) Oral daily  modafinil 100 milliGRAM(s) Oral <User Schedule>  multivitamin 1 Tablet(s) Oral daily  nystatin Powder 1 Application(s) Topical two times a day  oxyCODONE    IR 10 milliGRAM(s) Oral <User Schedule>  polyethylene glycol 3350 17 Gram(s) Oral daily  ramelteon 8 milliGRAM(s) Oral at bedtime  saccharomyces boulardii 250 milliGRAM(s) Oral two times a day  senna 2 Tablet(s) Oral at bedtime  tamsulosin 0.4 milliGRAM(s) Oral at bedtime  zinc oxide 40% Ointment 1 Application(s) Topical two times a day    MEDICATIONS  (PRN):  acetaminophen   Tablet .. 650 milliGRAM(s) Oral every 6 hours PRN Temp greater or equal to 38C (100.4F), Mild Pain (1 - 3)    ASSESSMENT & PLAN          GI/Bowel Management - Dulcolax PRN, Fleet PRN   Management - Toilet Q2  Skin - Turn Q2  Pain - Tylenol PRN  DVT PPX - Lovenox     Urine culture reviewed continue IV Ceftriaxone, case reviewed with Medicine       Continue comprehensive acute rehab program consisting of 3hrs/day of OT/PT and SLP.

## 2021-05-16 LAB — SARS-COV-2 RNA SPEC QL NAA+PROBE: SIGNIFICANT CHANGE UP

## 2021-05-16 PROCEDURE — 99232 SBSQ HOSP IP/OBS MODERATE 35: CPT

## 2021-05-16 RX ADMIN — Medication 10 MILLIGRAM(S): at 17:32

## 2021-05-16 RX ADMIN — Medication 112 MICROGRAM(S): at 05:26

## 2021-05-16 RX ADMIN — Medication 1 MILLIGRAM(S): at 11:59

## 2021-05-16 RX ADMIN — OXYCODONE HYDROCHLORIDE 10 MILLIGRAM(S): 5 TABLET ORAL at 08:05

## 2021-05-16 RX ADMIN — NYSTATIN CREAM 1 APPLICATION(S): 100000 CREAM TOPICAL at 05:27

## 2021-05-16 RX ADMIN — OXYCODONE HYDROCHLORIDE 10 MILLIGRAM(S): 5 TABLET ORAL at 09:00

## 2021-05-16 RX ADMIN — CEFTRIAXONE 100 MILLIGRAM(S): 500 INJECTION, POWDER, FOR SOLUTION INTRAMUSCULAR; INTRAVENOUS at 16:17

## 2021-05-16 RX ADMIN — ESCITALOPRAM OXALATE 10 MILLIGRAM(S): 10 TABLET, FILM COATED ORAL at 11:59

## 2021-05-16 RX ADMIN — MODAFINIL 100 MILLIGRAM(S): 200 TABLET ORAL at 06:25

## 2021-05-16 RX ADMIN — Medication 10 MILLIGRAM(S): at 05:26

## 2021-05-16 RX ADMIN — Medication 250 MILLIGRAM(S): at 05:26

## 2021-05-16 RX ADMIN — LIDOCAINE 1 PATCH: 4 CREAM TOPICAL at 00:30

## 2021-05-16 RX ADMIN — SENNA PLUS 2 TABLET(S): 8.6 TABLET ORAL at 21:12

## 2021-05-16 RX ADMIN — Medication 8 MILLIGRAM(S): at 21:12

## 2021-05-16 RX ADMIN — ENOXAPARIN SODIUM 40 MILLIGRAM(S): 100 INJECTION SUBCUTANEOUS at 05:26

## 2021-05-16 RX ADMIN — NYSTATIN CREAM 1 APPLICATION(S): 100000 CREAM TOPICAL at 17:30

## 2021-05-16 RX ADMIN — Medication 1 TABLET(S): at 12:00

## 2021-05-16 RX ADMIN — Medication 100 MILLIGRAM(S): at 05:26

## 2021-05-16 RX ADMIN — LIDOCAINE 1 PATCH: 4 CREAM TOPICAL at 12:00

## 2021-05-16 RX ADMIN — ZINC OXIDE 1 APPLICATION(S): 200 OINTMENT TOPICAL at 05:27

## 2021-05-16 RX ADMIN — TAMSULOSIN HYDROCHLORIDE 0.4 MILLIGRAM(S): 0.4 CAPSULE ORAL at 21:12

## 2021-05-16 RX ADMIN — ZINC OXIDE 1 APPLICATION(S): 200 OINTMENT TOPICAL at 17:30

## 2021-05-16 RX ADMIN — Medication 250 MILLIGRAM(S): at 17:32

## 2021-05-16 RX ADMIN — LIDOCAINE 1 PATCH: 4 CREAM TOPICAL at 21:12

## 2021-05-16 NOTE — PROGRESS NOTE ADULT - SUBJECTIVE AND OBJECTIVE BOX
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    C Section    Abnormality, eye  h/o left eye vitrectomy    S/P D&amp;C (status post dilation and curettage)    H/O left mastectomy      VITALS  Vital Signs Last 24 Hrs  T(C): 37.2 (16 May 2021 07:55), Max: 37.2 (16 May 2021 07:55)  T(F): 98.9 (16 May 2021 07:55), Max: 98.9 (16 May 2021 07:55)  HR: 61 (16 May 2021 07:55) (61 - 78)  BP: 120/72 (16 May 2021 07:55) (118/68 - 120/72)  BP(mean): --  RR: 16 (16 May 2021 07:55) (14 - 16)  SpO2: 94% (16 May 2021 07:55) (94% - 96%)      PHYSICAL EXAM  Constitutional - NAD, Comfortable  HEENT - NCAT, EOMI  Neck - Supple, No limited ROM  Chest - CTA bilaterally  Cardiovascular - RRR, S1S2  Abdomen - Soft, NTND  Extremities - No calf tenderness   Neurologic Exam -                    Cognitive - Awake, Alert     No new focal deficits            CURRENT MEDICATIONS    MEDICATIONS  (STANDING):  bethanechol 10 milliGRAM(s) Oral two times a day  cefTRIAXone   IVPB 1000 milliGRAM(s) IV Intermittent every 24 hours  enoxaparin Injectable 40 milliGRAM(s) SubCutaneous every 24 hours  escitalopram 10 milliGRAM(s) Oral daily  folic acid 1 milliGRAM(s) Oral daily  levothyroxine 112 MICROGram(s) Oral daily  lidocaine   Patch 1 Patch Transdermal daily  metoprolol succinate  milliGRAM(s) Oral daily  modafinil 100 milliGRAM(s) Oral <User Schedule>  multivitamin 1 Tablet(s) Oral daily  nystatin Powder 1 Application(s) Topical two times a day  oxyCODONE    IR 10 milliGRAM(s) Oral <User Schedule>  polyethylene glycol 3350 17 Gram(s) Oral daily  ramelteon 8 milliGRAM(s) Oral at bedtime  saccharomyces boulardii 250 milliGRAM(s) Oral two times a day  senna 2 Tablet(s) Oral at bedtime  tamsulosin 0.4 milliGRAM(s) Oral at bedtime  zinc oxide 40% Ointment 1 Application(s) Topical two times a day    MEDICATIONS  (PRN):  acetaminophen   Tablet .. 650 milliGRAM(s) Oral every 6 hours PRN Temp greater or equal to 38C (100.4F), Mild Pain (1 - 3)    ASSESSMENT & PLAN          GI/Bowel Management - Dulcolax PRN, Fleet PRN   Management - Toilet Q2  Skin - Turn Q2  Pain - Tylenol PRN  DVT PPX - Lovenox    Day 2 on IV Ceftriaxone for UTI, tolerates well        Continue comprehensive acute rehab program consisting of 3hrs/day of OT/PT and SLP.

## 2021-05-17 ENCOUNTER — RESULT REVIEW (OUTPATIENT)
Age: 66
End: 2021-05-17

## 2021-05-17 LAB
ALBUMIN SERPL ELPH-MCNC: 2.9 G/DL — LOW (ref 3.3–5)
ALBUMIN SERPL ELPH-MCNC: 3.3 G/DL — SIGNIFICANT CHANGE UP (ref 3.3–5)
ALP SERPL-CCNC: 321 U/L — HIGH (ref 40–120)
ALP SERPL-CCNC: 347 U/L — HIGH (ref 40–120)
ALT FLD-CCNC: 20 U/L — SIGNIFICANT CHANGE UP (ref 10–45)
ALT FLD-CCNC: 23 U/L — SIGNIFICANT CHANGE UP (ref 10–45)
ANION GAP SERPL CALC-SCNC: 6 MMOL/L — SIGNIFICANT CHANGE UP (ref 5–17)
ANION GAP SERPL CALC-SCNC: 9 MMOL/L — SIGNIFICANT CHANGE UP (ref 5–17)
APPEARANCE UR: CLEAR — SIGNIFICANT CHANGE UP
APTT BLD: 27.9 SEC — SIGNIFICANT CHANGE UP (ref 27.5–35.5)
AST SERPL-CCNC: 12 U/L — SIGNIFICANT CHANGE UP (ref 10–40)
AST SERPL-CCNC: 17 U/L — SIGNIFICANT CHANGE UP (ref 10–40)
BASOPHILS # BLD AUTO: 0.03 K/UL — SIGNIFICANT CHANGE UP (ref 0–0.2)
BASOPHILS NFR BLD AUTO: 0.4 % — SIGNIFICANT CHANGE UP (ref 0–2)
BILIRUB SERPL-MCNC: 0.4 MG/DL — SIGNIFICANT CHANGE UP (ref 0.2–1.2)
BILIRUB SERPL-MCNC: 0.4 MG/DL — SIGNIFICANT CHANGE UP (ref 0.2–1.2)
BILIRUB UR-MCNC: NEGATIVE — SIGNIFICANT CHANGE UP
BUN SERPL-MCNC: 19 MG/DL — SIGNIFICANT CHANGE UP (ref 7–23)
BUN SERPL-MCNC: 23 MG/DL — SIGNIFICANT CHANGE UP (ref 7–23)
CALCIUM SERPL-MCNC: 8.7 MG/DL — SIGNIFICANT CHANGE UP (ref 8.4–10.5)
CALCIUM SERPL-MCNC: 9 MG/DL — SIGNIFICANT CHANGE UP (ref 8.4–10.5)
CHLORIDE SERPL-SCNC: 104 MMOL/L — SIGNIFICANT CHANGE UP (ref 96–108)
CHLORIDE SERPL-SCNC: 104 MMOL/L — SIGNIFICANT CHANGE UP (ref 96–108)
CO2 SERPL-SCNC: 25 MMOL/L — SIGNIFICANT CHANGE UP (ref 22–31)
CO2 SERPL-SCNC: 30 MMOL/L — SIGNIFICANT CHANGE UP (ref 22–31)
COLOR SPEC: YELLOW — SIGNIFICANT CHANGE UP
CREAT SERPL-MCNC: 0.68 MG/DL — SIGNIFICANT CHANGE UP (ref 0.5–1.3)
CREAT SERPL-MCNC: 0.89 MG/DL — SIGNIFICANT CHANGE UP (ref 0.5–1.3)
DIFF PNL FLD: NEGATIVE — SIGNIFICANT CHANGE UP
EOSINOPHIL # BLD AUTO: 0.15 K/UL — SIGNIFICANT CHANGE UP (ref 0–0.5)
EOSINOPHIL NFR BLD AUTO: 2 % — SIGNIFICANT CHANGE UP (ref 0–6)
GLUCOSE SERPL-MCNC: 105 MG/DL — HIGH (ref 70–99)
GLUCOSE SERPL-MCNC: 127 MG/DL — HIGH (ref 70–99)
GLUCOSE UR QL: NEGATIVE — SIGNIFICANT CHANGE UP
HCT VFR BLD CALC: 35.1 % — SIGNIFICANT CHANGE UP (ref 34.5–45)
HCT VFR BLD CALC: 38.8 % — SIGNIFICANT CHANGE UP (ref 34.5–45)
HGB BLD-MCNC: 11.4 G/DL — LOW (ref 11.5–15.5)
HGB BLD-MCNC: 12.6 G/DL — SIGNIFICANT CHANGE UP (ref 11.5–15.5)
IMM GRANULOCYTES NFR BLD AUTO: 0.7 % — SIGNIFICANT CHANGE UP (ref 0–1.5)
INR BLD: 1.07 RATIO — SIGNIFICANT CHANGE UP (ref 0.88–1.16)
KETONES UR-MCNC: NEGATIVE — SIGNIFICANT CHANGE UP
LEUKOCYTE ESTERASE UR-ACNC: NEGATIVE — SIGNIFICANT CHANGE UP
LYMPHOCYTES # BLD AUTO: 1.56 K/UL — SIGNIFICANT CHANGE UP (ref 1–3.3)
LYMPHOCYTES # BLD AUTO: 20.7 % — SIGNIFICANT CHANGE UP (ref 13–44)
MCHC RBC-ENTMCNC: 30.1 PG — SIGNIFICANT CHANGE UP (ref 27–34)
MCHC RBC-ENTMCNC: 30.1 PG — SIGNIFICANT CHANGE UP (ref 27–34)
MCHC RBC-ENTMCNC: 32.5 GM/DL — SIGNIFICANT CHANGE UP (ref 32–36)
MCHC RBC-ENTMCNC: 32.5 GM/DL — SIGNIFICANT CHANGE UP (ref 32–36)
MCV RBC AUTO: 92.6 FL — SIGNIFICANT CHANGE UP (ref 80–100)
MCV RBC AUTO: 92.6 FL — SIGNIFICANT CHANGE UP (ref 80–100)
MONOCYTES # BLD AUTO: 0.52 K/UL — SIGNIFICANT CHANGE UP (ref 0–0.9)
MONOCYTES NFR BLD AUTO: 6.9 % — SIGNIFICANT CHANGE UP (ref 2–14)
NEUTROPHILS # BLD AUTO: 5.21 K/UL — SIGNIFICANT CHANGE UP (ref 1.8–7.4)
NEUTROPHILS NFR BLD AUTO: 69.3 % — SIGNIFICANT CHANGE UP (ref 43–77)
NITRITE UR-MCNC: NEGATIVE — SIGNIFICANT CHANGE UP
NRBC # BLD: 0 /100 WBCS — SIGNIFICANT CHANGE UP (ref 0–0)
NRBC # BLD: 0 /100 WBCS — SIGNIFICANT CHANGE UP (ref 0–0)
PH UR: 6.5 — SIGNIFICANT CHANGE UP (ref 5–8)
PLATELET # BLD AUTO: 180 K/UL — SIGNIFICANT CHANGE UP (ref 150–400)
PLATELET # BLD AUTO: 204 K/UL — SIGNIFICANT CHANGE UP (ref 150–400)
POTASSIUM SERPL-MCNC: 4 MMOL/L — SIGNIFICANT CHANGE UP (ref 3.5–5.3)
POTASSIUM SERPL-MCNC: 4.7 MMOL/L — SIGNIFICANT CHANGE UP (ref 3.5–5.3)
POTASSIUM SERPL-SCNC: 4 MMOL/L — SIGNIFICANT CHANGE UP (ref 3.5–5.3)
POTASSIUM SERPL-SCNC: 4.7 MMOL/L — SIGNIFICANT CHANGE UP (ref 3.5–5.3)
PROT SERPL-MCNC: 6.2 G/DL — SIGNIFICANT CHANGE UP (ref 6–8.3)
PROT SERPL-MCNC: 6.9 G/DL — SIGNIFICANT CHANGE UP (ref 6–8.3)
PROT UR-MCNC: NEGATIVE — SIGNIFICANT CHANGE UP
PROTHROM AB SERPL-ACNC: 12.9 SEC — SIGNIFICANT CHANGE UP (ref 10.6–13.6)
RBC # BLD: 3.79 M/UL — LOW (ref 3.8–5.2)
RBC # BLD: 4.19 M/UL — SIGNIFICANT CHANGE UP (ref 3.8–5.2)
RBC # FLD: 14.6 % — HIGH (ref 10.3–14.5)
RBC # FLD: 14.8 % — HIGH (ref 10.3–14.5)
SODIUM SERPL-SCNC: 138 MMOL/L — SIGNIFICANT CHANGE UP (ref 135–145)
SODIUM SERPL-SCNC: 140 MMOL/L — SIGNIFICANT CHANGE UP (ref 135–145)
SP GR SPEC: 1.01 — SIGNIFICANT CHANGE UP (ref 1.01–1.02)
UROBILINOGEN FLD QL: NEGATIVE — SIGNIFICANT CHANGE UP
WBC # BLD: 11.76 K/UL — HIGH (ref 3.8–10.5)
WBC # BLD: 7.52 K/UL — SIGNIFICANT CHANGE UP (ref 3.8–10.5)
WBC # FLD AUTO: 11.76 K/UL — HIGH (ref 3.8–10.5)
WBC # FLD AUTO: 7.52 K/UL — SIGNIFICANT CHANGE UP (ref 3.8–10.5)

## 2021-05-17 PROCEDURE — 71250 CT THORAX DX C-: CPT | Mod: 26

## 2021-05-17 PROCEDURE — 74176 CT ABD & PELVIS W/O CONTRAST: CPT | Mod: 26

## 2021-05-17 PROCEDURE — 93010 ELECTROCARDIOGRAM REPORT: CPT

## 2021-05-17 PROCEDURE — 88304 TISSUE EXAM BY PATHOLOGIST: CPT | Mod: 26

## 2021-05-17 PROCEDURE — 70450 CT HEAD/BRAIN W/O DYE: CPT | Mod: 26

## 2021-05-17 PROCEDURE — 70486 CT MAXILLOFACIAL W/O DYE: CPT | Mod: 26

## 2021-05-17 PROCEDURE — 72125 CT NECK SPINE W/O DYE: CPT | Mod: 26

## 2021-05-17 PROCEDURE — 99233 SBSQ HOSP IP/OBS HIGH 50: CPT

## 2021-05-17 RX ORDER — OXYCODONE HYDROCHLORIDE 5 MG/1
10 TABLET ORAL
Refills: 0 | Status: DISCONTINUED | OUTPATIENT
Start: 2021-05-19 | End: 2021-05-24

## 2021-05-17 RX ORDER — LIDOCAINE HYDROCHLORIDE AND EPINEPHRINE 10; 10 MG/ML; UG/ML
15 INJECTION, SOLUTION INFILTRATION; PERINEURAL ONCE
Refills: 0 | Status: COMPLETED | OUTPATIENT
Start: 2021-05-17 | End: 2021-05-17

## 2021-05-17 RX ORDER — BACITRACIN ZINC 500 UNIT/G
1 OINTMENT IN PACKET (EA) TOPICAL DAILY
Refills: 0 | Status: DISCONTINUED | OUTPATIENT
Start: 2021-05-17 | End: 2021-05-23

## 2021-05-17 RX ADMIN — ZINC OXIDE 1 APPLICATION(S): 200 OINTMENT TOPICAL at 06:10

## 2021-05-17 RX ADMIN — Medication 1 TABLET(S): at 11:05

## 2021-05-17 RX ADMIN — LIDOCAINE 1 PATCH: 4 CREAM TOPICAL at 00:30

## 2021-05-17 RX ADMIN — Medication 10 MILLIGRAM(S): at 06:09

## 2021-05-17 RX ADMIN — OXYCODONE HYDROCHLORIDE 10 MILLIGRAM(S): 5 TABLET ORAL at 09:01

## 2021-05-17 RX ADMIN — LIDOCAINE 1 PATCH: 4 CREAM TOPICAL at 11:05

## 2021-05-17 RX ADMIN — Medication 112 MICROGRAM(S): at 06:10

## 2021-05-17 RX ADMIN — Medication 1 MILLIGRAM(S): at 11:05

## 2021-05-17 RX ADMIN — TAMSULOSIN HYDROCHLORIDE 0.4 MILLIGRAM(S): 0.4 CAPSULE ORAL at 21:41

## 2021-05-17 RX ADMIN — NYSTATIN CREAM 1 APPLICATION(S): 100000 CREAM TOPICAL at 06:10

## 2021-05-17 RX ADMIN — Medication 250 MILLIGRAM(S): at 17:25

## 2021-05-17 RX ADMIN — Medication 8 MILLIGRAM(S): at 21:41

## 2021-05-17 RX ADMIN — ENOXAPARIN SODIUM 40 MILLIGRAM(S): 100 INJECTION SUBCUTANEOUS at 06:11

## 2021-05-17 RX ADMIN — CEFTRIAXONE 100 MILLIGRAM(S): 500 INJECTION, POWDER, FOR SOLUTION INTRAMUSCULAR; INTRAVENOUS at 15:58

## 2021-05-17 RX ADMIN — MODAFINIL 100 MILLIGRAM(S): 200 TABLET ORAL at 06:29

## 2021-05-17 RX ADMIN — ESCITALOPRAM OXALATE 10 MILLIGRAM(S): 10 TABLET, FILM COATED ORAL at 11:05

## 2021-05-17 RX ADMIN — NYSTATIN CREAM 1 APPLICATION(S): 100000 CREAM TOPICAL at 17:23

## 2021-05-17 RX ADMIN — LIDOCAINE 1 PATCH: 4 CREAM TOPICAL at 23:34

## 2021-05-17 RX ADMIN — SENNA PLUS 2 TABLET(S): 8.6 TABLET ORAL at 21:41

## 2021-05-17 RX ADMIN — Medication 650 MILLIGRAM(S): at 18:41

## 2021-05-17 RX ADMIN — OXYCODONE HYDROCHLORIDE 10 MILLIGRAM(S): 5 TABLET ORAL at 08:24

## 2021-05-17 RX ADMIN — ZINC OXIDE 1 APPLICATION(S): 200 OINTMENT TOPICAL at 17:25

## 2021-05-17 RX ADMIN — LIDOCAINE HYDROCHLORIDE AND EPINEPHRINE 15 MILLILITER(S): 10; 10 INJECTION, SOLUTION INFILTRATION; PERINEURAL at 19:21

## 2021-05-17 RX ADMIN — Medication 250 MILLIGRAM(S): at 06:09

## 2021-05-17 RX ADMIN — LIDOCAINE 1 PATCH: 4 CREAM TOPICAL at 19:58

## 2021-05-17 RX ADMIN — Medication 10 MILLIGRAM(S): at 17:25

## 2021-05-17 NOTE — PROGRESS NOTE ADULT - ASSESSMENT
66 yo woman with PMHx breast cancer s/p mastectomy, HTN, Hypothyroidism admitted to [facility] after hospital course at [facility] after a fall from a flight of stairs, sustaining SAH, left-sided rib fractures, left iliac wing fracture with hematoma, left forehead laceration, s/p successful closed reduction of left pinky PIP dislocation, Acute L3 compression fracture, 4/29, admitted for rehab- pt/ot/dvt ppx pain meds    s/p fall SAH left sided rib and left iliac wing fx, L3fx  fall today 5/17 while trying to get out of wheelchair without help  - laceration over left frontal region, now on dressing  - plastic to see to suture  - 5/17: Maxilofacial CT: There is a healing tiny nondisplaced fracture of the base of the left nasal bridge extending to the orbital rim. No new fractures are identified. There is resolution of left-sided periorbital soft tissue swelling. The deep orbital contents are within normal limits. There is clearing of the paranasal sinuses  - 5/17: CT head: no hge, CT c-spine: no fracture  - CT chest. look for LS spine and rib fracture  - labs: hb stable. no acute changes  - primary team is contacting [facility] for trauma monitoring  - continue comprehensive rehabilitation program per PMR. hold for today  - 1:1  - fall precautions  - pain meds per PMR team    Urinary retention  Constipation  - straight cath/ PVR per protocol  - Urology consult consulted  - continue Flomax / urecholine  - continue caths as needed  - will need Spangler when discharged if retention persists  - Continue bowel regimen    Normocytic Anemia with folic acid deficiency - stable  - monitor H/H    Elevated ALKPHOS  - probably 2/2 multiple fractures   - avoid hepatotoxins  - trend LFTs  - encourage hydration    Episodes of SVT   - Continue with toprol XL   - Can follow up in 3 months with EP Dr. Hernández [phone]. (elective ablation)    Hypothyroidism  - Continue Synthroid     Acute adjustment disorder with depressed mood    -  continue modafinil for cognitive stimulation    Insomnia  - melatonin     Acute Hypoxic Respiratory Failure likely due to Acute B/L rib fractures, and small pneumothorax  - resolved  - Incentive spirometry.   - Supplemental Oxygen as needed for O2 goal > 92%    VTE PPX   - Lovenox    d/w Dr. Underwood

## 2021-05-17 NOTE — PROGRESS NOTE ADULT - SUBJECTIVE AND OBJECTIVE BOX
Patient is a 65y old  Female who presents with a chief complaint of SAH and fractures of left-sided ribs, left iliac wing with hematoma, S/P closed reduction of left pinky PIP dislocation after trauma (12 May 2021 16:05)      HPI:  65F PMHx breast cancer s/p mastectomy, HTN, hypothyroidism, not on full anticoagulation/antiplatelets per EMS, presented to [facility] 4/19/21as a level 2 trauma activation after a fall from a flight of stairs. Patient only able to say "ow" in trauma bay, unable to provide further history. However per son, normal mentation at baseline. In the trauma bay, airway was intact with bilateral breath sounds, O2 saturation 100% on room air. Initially blood pressure was 140s systolic however on repeat 90s. NS@100 initiated. Secondary significant for left forehead laceration, left chest wall tenderness, left pelvic tenderness.     Ms. Arthur was hypotensive in the trauma bay and was transferred to the Surgical ICU after imaging was obtained. Her injury list is below.  (1) SAH, bilateral: stable on repeat imaging. Seven day course of Keppra for post-traumatic seizure prophylaxis.  (2) left 3-8 and right 7th rib fractures with occult left pneumothorax: pain control  (3) left iliac wing fracture with hematoma: required transfusion early in her ICU course, after which CBC were stable  (4) left forehead laceration s/p suture repair  (5) s/p successful closed reduction of left pinky PIP dislocation with delfin splint  (6) left clavicle fracture  (7) left nasal bone and left orbital rim fractures  (8) sternal fracture  Incidental findings: right renal lesion. Pt's  was notified and copy of the report given to him.     The patient was admitted to Trauma Surgery under SICU care.  Ortho was consulted for L. iliac wing fx, sternal fx, L. clavicle fx- recommended WBAT of the affected left lower extremity with walker, WBAT L shoulder, pendulum exercises okay, no lifting, sling for comfort, Encourage ROM of elbow/wrist/hand, PT/OT consult, NO urgent orthopedic surgical intervention at this time.    Neurosurgery consulted- recommended repeat CTH, which was stable.     PRS was consulted for non displaced left superior orbital rim and non displaced left nasal bone fracture--> No indication for operative fixation of these non displaced fractures; recommend ice to face to aid in swelling and analgesia.    She was placed on hemorrhage watch in the SICU given pelvic fracture and surrounding hematoma.  Behavior health was consulted for delirium.    Hand was consulted for L. pinky PIP dislocation- s/p closed reduction, rec Cont delfin taping for now.    4/21- CTH stable  - Added home lexapro  - Added NS @ 50cc/hr to supplement poor PO intake  - Lactate cleared (1.9)    4/22- Transfused 1u pRBC, Hct 20.9 -> 26.9  - Went into SVT, given adenosine x1 and started on metoprolol 25mg q12hr  - Added oxycodone 5mg q4hrs prn for increased pain control  - PO intake improved, IVL    4/23- EP was consulted for SVT, recc continue lopressor 25mg   Metoprolol PO increased from 25mg q12hrs to 25mg q6hrs.   - Added salt tabs 1g q8hrs and started on NS @ 50cc/hr for hyponatremia  - Spangler discontinued, passed TOV  - Added lovenox  - Cervical collar cleared by confrontational exam     4/24- Patient transferred from SICU to surgical floor in stable condition.  Discharge planning to acute rehab.     Stable from neurologic perspective consistent with mild TBI  Multiple rib fractures - breathing comfortable with multimodal pain control    On day of discharge, the patient was tolerating diet, working with PT well and pain controlled. The patient was medically stable for discharge to acute rehab with instructions for outpatient follow up 4/27/21. 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    C Section    Abnormality, eye  h/o left eye vitrectomy    S/P D&amp;C (status post dilation and curettage)    H/O left mastectomy        MEDICATIONS  (STANDING):  bethanechol 10 milliGRAM(s) Oral two times a day  cefTRIAXone   IVPB 1000 milliGRAM(s) IV Intermittent every 24 hours  enoxaparin Injectable 40 milliGRAM(s) SubCutaneous every 24 hours  escitalopram 10 milliGRAM(s) Oral daily  folic acid 1 milliGRAM(s) Oral daily  levothyroxine 112 MICROGram(s) Oral daily  lidocaine   Patch 1 Patch Transdermal daily  metoprolol succinate  milliGRAM(s) Oral daily  modafinil 100 milliGRAM(s) Oral <User Schedule>  multivitamin 1 Tablet(s) Oral daily  nystatin Powder 1 Application(s) Topical two times a day  oxyCODONE    IR 10 milliGRAM(s) Oral <User Schedule>  polyethylene glycol 3350 17 Gram(s) Oral daily  ramelteon 8 milliGRAM(s) Oral at bedtime  saccharomyces boulardii 250 milliGRAM(s) Oral two times a day  senna 2 Tablet(s) Oral at bedtime  tamsulosin 0.4 milliGRAM(s) Oral at bedtime  zinc oxide 40% Ointment 1 Application(s) Topical two times a day    MEDICATIONS  (PRN):  acetaminophen   Tablet .. 650 milliGRAM(s) Oral every 6 hours PRN Temp greater or equal to 38C (100.4F), Mild Pain (1 - 3)        Allergies    Tapazole (Hives)    Intolerances        Vital Signs Last 24 Hrs  T(C): 36.8 (17 May 2021 07:28), Max: 36.8 (16 May 2021 20:22)  T(F): 98.2 (17 May 2021 07:28), Max: 98.2 (16 May 2021 20:22)  HR: 66 (17 May 2021 07:28) (64 - 79)  BP: 104/68 (17 May 2021 07:28) (104/60 - 117/71)  BP(mean): --  RR: 15 (17 May 2021 07:28) (14 - 15)  SpO2: 95% (17 May 2021 07:28) (94% - 95%)        RECENT LABS:                          11.4   7.52  )-----------( 180      ( 17 May 2021 05:00 )             35.1     05-17    140  |  104  |  19  ----------------------------<  105<H>  4.7   |  30  |  0.68    Ca    8.7      17 May 2021 05:00    TPro  6.2  /  Alb  2.9<L>  /  TBili  0.4  /  DBili  x   /  AST  12  /  ALT  20  /  AlkPhos  321<H>  05-17              Review of Systems:   · Additional ROS	Patient awake, denies difficulty sleeping. She stated that she felt ok and denied pain but was restless and weight shifting while seated in wheelchair. Denied further left breast/rib pain.     , then 480, straight cath 500 this AM    Physical Exam:   · Constitutional	detailed exam  · Constitutional Details	no distress  · Constitutional Comments	alert, NAD O x 2  	  	  · Respiratory	detailed exam  · Respiratory Details	breath sounds equal; respirations non-labored; clear to auscultation bilaterally; no wheezes  fair effort reduced BS bases left > right  · Cardiovascular	detailed exam  · Cardiovascular Details	regular rate and rhythm  · Gastrointestinal	detailed exam  · GI Normal	nontender; no distention; bowel sounds normal  	  · Extremities	 bialteral calves soft, NT no erythema or warmth  	no swelling left V finger or wrist/hand  	  · Skin	no irritation/breakdown/ecchymoses underside breast, no puckering or induration or TTP  +TTP along anterior and lateral ribs, mild  	               Patient is a 65y old  Female who presents with a chief complaint of SAH and fractures of left-sided ribs, left iliac wing with hematoma, S/P closed reduction of left pinky PIP dislocation after trauma (12 May 2021 16:05)      HPI:  65F PMHx breast cancer s/p mastectomy, HTN, hypothyroidism, not on full anticoagulation/antiplatelets per EMS, presented to [facility] 4/19/21as a level 2 trauma activation after a fall from a flight of stairs. Patient only able to say "ow" in trauma bay, unable to provide further history. However per son, normal mentation at baseline. In the trauma bay, airway was intact with bilateral breath sounds, O2 saturation 100% on room air. Initially blood pressure was 140s systolic however on repeat 90s. NS@100 initiated. Secondary significant for left forehead laceration, left chest wall tenderness, left pelvic tenderness.     Ms. Arthur was hypotensive in the trauma bay and was transferred to the Surgical ICU after imaging was obtained. Her injury list is below.  (1) SAH, bilateral: stable on repeat imaging. Seven day course of Keppra for post-traumatic seizure prophylaxis.  (2) left 3-8 and right 7th rib fractures with occult left pneumothorax: pain control  (3) left iliac wing fracture with hematoma: required transfusion early in her ICU course, after which CBC were stable  (4) left forehead laceration s/p suture repair  (5) s/p successful closed reduction of left pinky PIP dislocation with delfin splint  (6) left clavicle fracture  (7) left nasal bone and left orbital rim fractures  (8) sternal fracture  Incidental findings: right renal lesion. Pt's  was notified and copy of the report given to him.     The patient was admitted to Trauma Surgery under SICU care.  Ortho was consulted for L. iliac wing fx, sternal fx, L. clavicle fx- recommended WBAT of the affected left lower extremity with walker, WBAT L shoulder, pendulum exercises okay, no lifting, sling for comfort, Encourage ROM of elbow/wrist/hand, PT/OT consult, NO urgent orthopedic surgical intervention at this time.    Neurosurgery consulted- recommended repeat CTH, which was stable.     PRS was consulted for non displaced left superior orbital rim and non displaced left nasal bone fracture--> No indication for operative fixation of these non displaced fractures; recommend ice to face to aid in swelling and analgesia.    She was placed on hemorrhage watch in the SICU given pelvic fracture and surrounding hematoma.  Behavior health was consulted for delirium.    Hand was consulted for L. pinky PIP dislocation- s/p closed reduction, rec Cont delfin taping for now.    4/21- CTH stable  - Added home lexapro  - Added NS @ 50cc/hr to supplement poor PO intake  - Lactate cleared (1.9)    4/22- Transfused 1u pRBC, Hct 20.9 -> 26.9  - Went into SVT, given adenosine x1 and started on metoprolol 25mg q12hr  - Added oxycodone 5mg q4hrs prn for increased pain control  - PO intake improved, IVL    4/23- EP was consulted for SVT, recc continue lopressor 25mg   Metoprolol PO increased from 25mg q12hrs to 25mg q6hrs.   - Added salt tabs 1g q8hrs and started on NS @ 50cc/hr for hyponatremia  - Spangler discontinued, passed TOV  - Added lovenox  - Cervical collar cleared by confrontational exam     4/24- Patient transferred from SICU to surgical floor in stable condition.  Discharge planning to acute rehab.     Stable from neurologic perspective consistent with mild TBI  Multiple rib fractures - breathing comfortable with multimodal pain control    On day of discharge, the patient was tolerating diet, working with PT well and pain controlled. The patient was medically stable for discharge to acute rehab with instructions for outpatient follow up 4/27/21. 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    C Section    Abnormality, eye  h/o left eye vitrectomy    S/P D&amp;C (status post dilation and curettage)    H/O left mastectomy        MEDICATIONS  (STANDING):  bethanechol 10 milliGRAM(s) Oral two times a day  cefTRIAXone   IVPB 1000 milliGRAM(s) IV Intermittent every 24 hours  enoxaparin Injectable 40 milliGRAM(s) SubCutaneous every 24 hours  escitalopram 10 milliGRAM(s) Oral daily  folic acid 1 milliGRAM(s) Oral daily  levothyroxine 112 MICROGram(s) Oral daily  lidocaine   Patch 1 Patch Transdermal daily  metoprolol succinate  milliGRAM(s) Oral daily  modafinil 100 milliGRAM(s) Oral <User Schedule>  multivitamin 1 Tablet(s) Oral daily  nystatin Powder 1 Application(s) Topical two times a day  oxyCODONE    IR 10 milliGRAM(s) Oral <User Schedule>  polyethylene glycol 3350 17 Gram(s) Oral daily  ramelteon 8 milliGRAM(s) Oral at bedtime  saccharomyces boulardii 250 milliGRAM(s) Oral two times a day  senna 2 Tablet(s) Oral at bedtime  tamsulosin 0.4 milliGRAM(s) Oral at bedtime  zinc oxide 40% Ointment 1 Application(s) Topical two times a day    MEDICATIONS  (PRN):  acetaminophen   Tablet .. 650 milliGRAM(s) Oral every 6 hours PRN Temp greater or equal to 38C (100.4F), Mild Pain (1 - 3)        Allergies    Tapazole (Hives)    Intolerances        Vital Signs Last 24 Hrs  T(C): 36.8 (17 May 2021 07:28), Max: 36.8 (16 May 2021 20:22)  T(F): 98.2 (17 May 2021 07:28), Max: 98.2 (16 May 2021 20:22)  HR: 66 (17 May 2021 07:28) (64 - 79)  BP: 104/68 (17 May 2021 07:28) (104/60 - 117/71)  BP(mean): --  RR: 15 (17 May 2021 07:28) (14 - 15)  SpO2: 95% (17 May 2021 07:28) (94% - 95%)        RECENT LABS:                          11.4   7.52  )-----------( 180      ( 17 May 2021 05:00 )             35.1     05-17    140  |  104  |  19  ----------------------------<  105<H>  4.7   |  30  |  0.68    Ca    8.7      17 May 2021 05:00    TPro  6.2  /  Alb  2.9<L>  /  TBili  0.4  /  DBili  x   /  AST  12  /  ALT  20  /  AlkPhos  321<H>  05-17              Review of Systems:   · Additional ROS	Patient awake, denies difficulty sleeping. She stated that she felt ok and denied pain but was restless and weight shifting while seated in wheelchair. Denied further left breast/rib pain. Denies H/A, dizziness, constipation, dysuria    , then 480, straight cath 500 this AM    Physical Exam:   · Constitutional	detailed exam  · Constitutional Details	no distress  · Constitutional Comments	alert, NAD O x 2 Restless, baseline arousal. inconsistent yes/no but improves with self-correction and repetition  	  	  · Respiratory	detailed exam  · Respiratory Details	breath sounds equal; respirations non-labored; reduced BS bases  fair effort  · Cardiovascular	detailed exam  · Cardiovascular Details	regular rate and rhythm  · Gastrointestinal	detailed exam  · GI Normal	nontender; no distention; bowel sounds normal  	  · Extremities	 bialteral calves soft, NT no erythema or warmth  	no swelling left V finger or wrist/hand  	  · Skin	  +TTP along anterior and lateral ribs, mild

## 2021-05-17 NOTE — DISCHARGE NOTE PROVIDER - NSDCCAREPROVSEEN_GEN_ALL_CORE_FT
Tha, Dereck Mcdonald, Shelia Szymanski, Alan Underwood, Delores Suarez, James Wheatley, Miguel Greene, Brandy Bustos, [place]

## 2021-05-17 NOTE — DISCHARGE NOTE PROVIDER - NSDCCPCAREPLAN_GEN_ALL_CORE_FT
PRINCIPAL DISCHARGE DIAGNOSIS  Diagnosis: Fracture of multiple bones  Assessment and Plan of Treatment: You were admitted to acute rehab after multiple fractures after a fall at home. You may do range of motion exercises with your left elbow, wrist, and hand, and left shoulder pendulum exercises. You may use a sling for your left arm for comfort. DO NOT weight bear through your left arm/shoulder. Continue taking Lovenox until 6/3/21 to prevent blood clots.   - Follow up with Dr. Nell Choe (plastic surgery) for your left finger dislocation, facial laceration, and facial fractures.  - Follow up with Dr. Reji Cardoza (orthopedics) for your left clavicle and left pelvic wing fractures.  - Follow up with Dr. Dino Peña (surgery) for your rib fractures.  - Follow up with Dr. Delores Underwood (physiatry) for your rehab needs in 4 weeks after discharge.      SECONDARY DISCHARGE DIAGNOSES  Diagnosis: SVT (supraventricular tachycardia)  Assessment and Plan of Treatment: You had an episode of rapid heart rate. Continue Toprol XL.  - Follow up with Dr. Hernández (electrophysiology/cardiology), to consider an ablation.    Diagnosis: Kidney lesion  Assessment and Plan of Treatment: Follow up with Dr. Herrera upon discharge to monitor and further workup your right kidney lesion.    Diagnosis: Hypothyroid  Assessment and Plan of Treatment: Continue taking Synthroid daily.    Diagnosis: Urinary retention  Assessment and Plan of Treatment: Continue taking Flomax and Bethanechol. You were treated with Macrobid and Ceftriaxone for a UTI.   - Follow up with your urologist upon discharge.    Diagnosis: Depression  Assessment and Plan of Treatment: You were seen by a psychiatrist for depression symptoms. Continue taking Lexapro and Modafinil.     PRINCIPAL DISCHARGE DIAGNOSIS  Diagnosis: Fracture of multiple bones  Assessment and Plan of Treatment: You were admitted to acute rehab after multiple fractures after a fall at home. You may do range of motion exercises with your left elbow, wrist, and hand, and left shoulder pendulum exercises. You may use a sling for your left arm for comfort. DO NOT weight bear through your left arm/shoulder. Continue taking Lovenox until 6/3/21 to prevent blood clots.   - Follow up with Dr. Nell Choe (plastic surgery) for your left finger dislocation, facial laceration, and facial fractures.  - Follow up with Dr. Reji Cardoza (orthopedics) for your left clavicle and left pelvic wing fractures.  - Follow up with Dr. Dino Peña (surgery) for your rib fractures.  - Follow up with Dr. Delores Underwood (physiatry) for your rehab needs in 4 weeks after discharge.      SECONDARY DISCHARGE DIAGNOSES  Diagnosis: SVT (supraventricular tachycardia)  Assessment and Plan of Treatment: You had an episode of rapid heart rate. Continue Toprol XL. You were transferred to telemetry for continuous monitoring.  - Follow up with Dr. Hernández (electrophysiology/cardiology), to consider an ablation.    Diagnosis: Kidney lesion  Assessment and Plan of Treatment: Follow up with Dr. Herrera upon discharge to monitor and further workup your right kidney lesion.    Diagnosis: Hypothyroid  Assessment and Plan of Treatment: Continue taking Synthroid daily.    Diagnosis: Urinary retention  Assessment and Plan of Treatment: Continue taking Flomax and Bethanechol. You were treated with Macrobid and Ceftriaxone for a UTI.   - Follow up with your urologist upon discharge.    Diagnosis: Depression  Assessment and Plan of Treatment: You were seen by a psychiatrist for depression symptoms. Continue taking Lexapro and Modafinil.

## 2021-05-17 NOTE — DISCHARGE NOTE PROVIDER - CARE PROVIDERS DIRECT ADDRESSES
,DirectAddress_Unknown,[email or web address]_Unknown,DirectAddress_Unknown,[email or web address],DirectAddress_Unknown

## 2021-05-17 NOTE — DISCHARGE NOTE PROVIDER - CARE PROVIDER_API CALL
Silvina Herrera  INTERNAL MEDICINE  [address] [place] [address]  Phone: [phone]  Fax: [phone]  Follow Up Time:     iVkas Aquino)  Neurosurgery  [address] [facility], [address] [place] [address]  Phone: [phone]  Fax: [phone]  Follow Up Time:     Nell Choe)  Plastic Surgery  [address] [facility], [address]  [place], [place] [id number]  Phone: [phone]  Fax: [phone]  Follow Up Time:     Reji Cardoza)  Orthopaedic Surgery  [address] [facility], [facility] [address]  [place] [address]  Phone: [phone]  Fax: [phone]  Follow Up Time:     Delores Underwood  Physical Medicine and Rehabilitation  [address] [place] [address]  Phone: [phone]  Fax: [phone]  Follow Up Time:     Dino Peña)  Surgery; Surgical Critical Care  [address] [facility], [address], [place] [address]  Phone: [phone]  Fax: [phone]  Follow Up Time:     Morgan Hernández; PhD)  Cardiac Electrophysiology; Cardiovascular Disease; Internal Medicine  [facility] - Dept of Cardiology, [address] [place] [address]  Phone: [phone]  Fax: [phone]  Follow Up Time:

## 2021-05-17 NOTE — PROVIDER CONTACT NOTE (FALL NOTIFICATION) - ACTION/TREATMENT ORDERED:
CT body scans ordered, EKG ordered, labs ordered, neurochecks ordered. Placed on constant observation.

## 2021-05-17 NOTE — CHART NOTE - NSCHARTNOTEFT_GEN_A_CORE
Nutrition Follow Up Note  Hospital Course (Per Electronic Medical Record): 65y with PMHx breast Ca S/P mastectomy, HTN, hypothyroidism who presented to [facility] 4/19/21 s/p fall down a flight of stairs with bilateral frontoparietal SAH; L nasal bone and L orbital rim fractures; sternal fracture; L 3-8, R 7th rib fractures;  L pneumothorax, L iliac wing fracture with hematoma S/P 1U pRBCs; left clavicle fx, left V finger PIP dislocation s/p CR. She is WBAT Left LE, NWB Left UE. Pt discussed during AM interdisciplinary meeting, UA ordered 5/17 due to increased restlessness, patient unable to verbalize why.   Source: Medical Record [X] Patient [X] Family [ ]         Diet: Regular, Kosher, Ensure Enlive BID  Pt tolerating diet with fair-good PO intake (50-75% of meals per nursing flow sheets). Observed during lunch with fair PO intake of Kosher meal. Pt reports that she doesn't have an appetite currently. Obtained food preferences, will inform kitchen. Per discussion w/ nursing, pt has been eating fairly well, RN will monitor acceptance of Ensure supplement this evening. Pt reports she has been consuming Ensure supplement. Pt requires assistance with meals due to fractures.     Current Weight: 130 lbs (5/15)  130.2 lbs (5/14)  131.8 lbs (5/9)  136 lbs (4/27)- dosing weight  Weight downtrending since admission.     Pertinent Medications: MEDICATIONS  (STANDING):  bethanechol 10 milliGRAM(s) Oral two times a day  cefTRIAXone   IVPB 1000 milliGRAM(s) IV Intermittent every 24 hours  enoxaparin Injectable 40 milliGRAM(s) SubCutaneous every 24 hours  escitalopram 10 milliGRAM(s) Oral daily  folic acid 1 milliGRAM(s) Oral daily  levothyroxine 112 MICROGram(s) Oral daily  lidocaine   Patch 1 Patch Transdermal daily  metoprolol succinate  milliGRAM(s) Oral daily  modafinil 100 milliGRAM(s) Oral <User Schedule>  multivitamin 1 Tablet(s) Oral daily  nystatin Powder 1 Application(s) Topical two times a day  oxyCODONE    IR 10 milliGRAM(s) Oral <User Schedule>  polyethylene glycol 3350 17 Gram(s) Oral daily  ramelteon 8 milliGRAM(s) Oral at bedtime  saccharomyces boulardii 250 milliGRAM(s) Oral two times a day  senna 2 Tablet(s) Oral at bedtime  tamsulosin 0.4 milliGRAM(s) Oral at bedtime  zinc oxide 40% Ointment 1 Application(s) Topical two times a day    MEDICATIONS  (PRN):  acetaminophen   Tablet .. 650 milliGRAM(s) Oral every 6 hours PRN Temp greater or equal to 38C (100.4F), Mild Pain (1 - 3)      Pertinent Labs:  05-17 Na140 mmol/L Glu 105 mg/dL<H> K+ 4.7 mmol/L Cr  0.68 mg/dL BUN 19 mg/dL 05-17 Alb 2.9 g/dL<L>        Skin: bruised per nursing flow sheets    Edema: none noted per nursing flow sheets    Last BM: on 5/14 per nursing flow sheets    Estimated Needs:   [X] No Change since Previous Assessment  Based on admission weight (133 lbs)  8315-4494 kcal/day (30-35 kcal/kg)  72-84 g protein/day (1.2-1.4 g/kg)  3764-0503 ml fluid/day (30-35 ml/kg)    Previous Nutrition Diagnosis:   Increased nutrient needs-- pt s/p fall with multiple injuries    Nutrition Diagnosis is [X] Ongoing    [ ] Resolved   [ ] Not Applicable      New Nutrition Diagnosis: [X] Not Applicable  [ ] Inadequate Protein Energy Intake   [ ] Inadequate Oral Intake   [ ] Excessive Energy Intake   [ ] Increased Nutrient Needs   [ ] Obesity   [ ] Altered GI Function   [ ] Unintended Weight Loss   [ ] Food & Nutrition Related Knowledge Deficit  [ ] Limited Adherence to nutrition related recommendations   [ ] Malnutrition      Interventions:   1. Monitor PO intake, follow up with RN regarding intake of oral nutrition supplement. If pt has good intake of oral nutrition supplement, consider increasing Ensure Enlive to TID  2. Encourage PO intake of food and fluids- UA ordered, if pt has infection may be affecting appetite  3. Continue with current diet, obtain and honor food preferences as able    Monitoring & Evaluation:   [X] Weights   [X] PO Intake   [X] Follow Up (Per Protocol)  [X] Tolerance to Diet Prescription   [X] Other: Labs     RD Remains Available.  Diana Sandhu RD

## 2021-05-17 NOTE — PROGRESS NOTE ADULT - ASSESSMENT
JONATHAN CHILD is a 65y with PMHx breast Ca S/P mastectomy, HTN, hypothyroidism who presented to [facility] 4/19/21 s/p fall down a flight of stairs with bilateral frontoparietal SAH; L nasal bone and L orbital rim fractures; sternal fracture; L 3-8, R 7th rib fractures;  L pneumothorax, L iliac wing fracture with hematoma S/P 1U pRBCs; left clavicle fx, left V finger PIP dislocation s/p CR. She is WBAT Left LE, NWB Left UE.     #SAH  - Avoid NSAIDs  - monitor neurological status, stable  - F/u neurosurgery, Dr. Vikas Aquino, upon discharge  - Precautions: falls, sternal , WB as above, AMS, cardiac, h/o breast CA    # Multiple fractures. WBAT LLE, NWB LUE   - Cont comprehensive rehab program, PT/OT/SLP 3 hours a day, 5 days a week.  - cleared for ROM exercises for L elbow, wrist, hand.  L shoulder pendulum exercises. Sling to LUE for comfort. No lifting more than 1-3 pounds for 6 weeks.  - LSO brace whenever OOB. LSO was lost overnight 5/14. Contacted ortho for replacement LSO. In bed therapy until LSO replaced, discussed with patient and staff   - Cleared to remove buddy tape for L 5th finger dislocation per Dr. Choe 5/10  - Continue Lovenox for 6 weeks total (through 6/3)  - pain management as below  - follow up:  ·	plastic surgeon Dr. Nell Choe, for facial laceration, L pinky dislocation, L nasal bone and orbital rim fractures.   ·	ortho, Dr. Reji Cardoza, for L clavicle and L pelvic wing fractures  ·	surgery, Dr. Dino Peña, for rib fractures    # Acute Hypoxic Respiratory Failure likely due to Acute B/L rib fractures, and small pneumothorax  - Incentive spirometry. Breathing exercises  - Supplemental Oxygen PRN to keep O2 sats > 92%  - currently stable on RA. 95-97% 5/14  - left 'breast pain' likely due to rib fx /possible atelectasis, +reproducible. continue breathing exercises, lidoderm patch    # Elevated Alk Phos likely 2/2 trauma, bone fractures  - Hepatic sono 5/7: suggestive hepatic steatosis. No focal hepatic masses identified. Multiple gallstones, without evidence for gallbladder wall thickening or pericholecystic fluid.  - 5/7 GGT 37, alk phos 431 --> AST  12  /  ALT  20  /  AlkPhos  321<H>  05-17 improving  - CMP 5/20    # H/o SVT  - Continue with toprol XL   - Can follow up in 3 months with EP Dr. Hernández [phone]. (elective ablation)    #R kidney lesion  - F/u Dr. Herrera outpatient or PCP    #Hypothyroidism  - Continue Synthroid 112mcg daily    # Depression  - Psychiatry f/u 5/11 read and appreciated:   ·	Continue Lexapro 10mg  ·	Continue Modafinil 100mg  ·	Continue Ramelteon 8mg    # Pain  - Tylenol  - Lidoderm to L chest wall  - Scheduled Oxycodone 10mg at 8am before therapy to help improve pain control    # GI/Bowel:  - Senna QHS, Miralax Daily    # /Bladder:   - Avila placed 4/29/21 for urinary retention. TOV 5/6   -  consult 5/14 appreciated. May require avila upon discharge if retention persists.  ·	Continue Bethanechol 10 bid  ·	Continue Flomax 0.4 mg daily  ·	monitor voiding. straight caths as needed for high PVR  - +cloudy UA PVR >500 ml  ·	Urine Cx: E. coli  ·	Nitrofurantoin 100 q12 switched to CTX for 3 day course 5/15-5/18  ·	UA ordered 5/17 due to restlessness, patient unable to verbalize why    # Diet  - 1:1 supervision for meals    # DVT ppx:  - Lovenox  - SCDs    # Case discussed in IDT 5/12/21:  - lives with son and spouse, has HHA for iADLs but able to do ADLs independently. Owns cane and RW.  - ambulates 40 feet with WBQC min-mod assist, 2 step with 1 HR and mod assist, supervision eating, min assist grooming, mod assist UB/LB dressing, mod assist toileting, max assist bathing  - Goal: supervision grooming, bathing LBD, Mark shower transfers, Mark transfers and ambulation  - multiple attempts by team to reach family to provide training unsuccessful. Given cognitive and physical deficits, uncertainty of availability support in community (requires round the clock at this time) will recommend JULI    # Family update:  - Provided update 5/13/21 to . All questions answered.    #LABS  Urine CX sensitivities  LSO  CBC BMP 5/20    ---------------  Outpatient Follow-up (Specialty/Name of physician):  Silvina Herrera  INTERNAL MEDICINE  [address] [place] [address]  Phone: [phone]  Fax: [phone]  Follow Up Time: 1 week    Vikas Aquino)  Neurosurgery  [address] [facility], [address] [place] [address]  Phone: [phone]  Fax: [phone]  Follow Up Time: 2 weeks    Nell Choe)  Plastic Surgery  [address] [facility] [address]  [place], [place] [address]  Phone: [phone]  Fax: [phone]  Follow Up Time: 1 week    Reji Cardoza)  Orthopaedic Surgery  [address] [facility] [address]  [place] [address]  Phone: [phone]  Fax: [phone]  Follow Up Time: Routine    Dino Peña)  Surgery; Surgical Critical Care  [address] [facility], [address], [place] [address]  Phone: [phone]  Fax: [phone]  Follow Up Time: Routine    Morgan Hernández; PhD)  Cardiac Electrophysiology; Cardiovascular Disease; Internal Medicine  [facility] - Dept of Cardiology, [address] [place] [address]  Phone: [phone]  Fax: [phone]   JONATHAN CHILD is a 65y with PMHx breast Ca S/P mastectomy, HTN, hypothyroidism who presented to [facility] 4/19/21 s/p fall down a flight of stairs with bilateral frontoparietal SAH; L nasal bone and L orbital rim fractures; sternal fracture; L 3-8, R 7th rib fractures;  L pneumothorax, L iliac wing fracture with hematoma S/P 1U pRBCs; left clavicle fx, left V finger PIP dislocation s/p CR. She is WBAT Left LE, NWB Left UE.     #SAH  - Avoid NSAIDs  - monitor neurological status, stable  - F/u neurosurgery, Dr. Vikas Aquino, upon discharge  - Precautions: falls, sternal , WB as above, AMS, cardiac, h/o breast CA    # Multiple fractures. WBAT LLE, NWB LUE   - Cont comprehensive rehab program, PT/OT/SLP 3 hours a day, 5 days a week.  - cleared for ROM exercises for L elbow, wrist, hand.  L shoulder pendulum exercises. Sling to LUE for comfort. No lifting more than 1-3 pounds for 6 weeks.  - LSO brace whenever OOB.  LSO replaced 5/14  -  buddy tape dc'd for L 5th finger dislocation (per Dr. Choe 5/10)  - Continue Lovenox for 6 weeks total (through 6/3)  - pain management as below  - follow up:  ·	plastic surgeon Dr. Nell Choe, for facial laceration, L pinky dislocation, L nasal bone and orbital rim fractures.   ·	ortho, Dr. Reji Cardoza, for L clavicle and L pelvic wing fractures  ·	surgery, Dr. Dino Peña, for rib fractures    # Acute Hypoxic Respiratory Failure likely due to Acute B/L rib fractures, and small pneumothorax  - Incentive spirometry. Breathing exercises  - Supplemental Oxygen PRN to keep O2 sats > 92%  - left 'breast pain' likely due to rib fx /possible atelectasis, +reproducible. continue breathing exercises, lidoderm patch. Stable 5/17 RA 94-95%    # Elevated Alk Phos likely 2/2 trauma, bone fractures  - Hepatic sono 5/7: Multiple gallstones, without evidence for gallbladder wall thickening or pericholecystic fluid.  - 5/7 GGT 37, alk phos 431 --> AST  12  /  ALT  20  /  AlkPhos  321<H>  05-17 improving  - CMP 5/20    # H/o SVT  - Continue with toprol XL   - Can follow up in 3 months with EP Dr. Hernández [phone]. (elective ablation)    #R kidney lesion  - F/u Dr. Herrera outpatient or PCP    #Hypothyroidism  - Continue Synthroid 112mcg daily    # Depression: stable/improved  - Psychiatry f/u 5/11 read and appreciated:   ·	Continue Lexapro 10mg  ·	Continue Modafinil 100mg  ·	Continue Ramelteon 8mg    # Pain  - Tylenol  - Lidoderm to L chest wall  - Scheduled Oxycodone 10mg at 8am before therapy to help improve pain control    # GI/Bowel:  - Senna QHS, Miralax Daily    # /Bladder:   - Avila placed 4/29/21 for urinary retention. TOV 5/6   -  consult 5/14 appreciated. May require avila upon discharge if retention persists.  ·	Continue Bethanechol 10 bid  ·	Continue Flomax 0.4 mg daily  ·	monitor voiding. straight caths as needed for high PVR  - Urine Cx: E. coli: started on Nitrofurantoin 100 q12 switched to CTX for 3 day course 5/15-5/18  ·	UA ordered 5/17 due to increased restlessness, patient unable to verbalize why    # Diet  - regular solids thin liquids, 1:1 supervision for meals    # DVT ppx:  - Lovenox  - SCDs    # Case discussed in IDT 5/12/21:  - lives with son and spouse, has HHA for iADLs but able to do ADLs independently. Owns cane and RW.  - ambulates 40 feet with WBQC min-mod assist, 2 step with 1 HR and mod assist, supervision eating, min assist grooming, mod assist UB/LB dressing, mod assist toileting, max assist bathing  - Goal: supervision grooming, bathing LBD, Mark shower transfers, Mark transfers and ambulation  - multiple attempts by team to reach family to provide training unsuccessful. Given cognitive and physical deficits, uncertainty of availability support in community (requires round the clock at this time) will recommend JULI      #LABS  Urine CX sensitivities  CBC BMP 5/20    ---------------  Outpatient Follow-up (Specialty/Name of physician):  Silvina Herrera  INTERNAL MEDICINE  [address] [place] [address]  Phone: [phone]  Fax: [phone]  Follow Up Time: 1 week    Vikas Aquino)  Neurosurgery  [address] [facility], [address] [place] [address]  Phone: [phone]  Fax: [phone]  Follow Up Time: 2 weeks    Nell Choe)  Plastic Surgery  [address] [facility], [address]  [place], [place] [id number]  Phone: [phone]  Fax: [phone]  Follow Up Time: 1 week    Reji Cardoza)  Orthopaedic Surgery  [address] [facility], [address]  [place] [address]  Phone: [phone]  Fax: [phone]  Follow Up Time: Routine    Dino Peña)  Surgery; Surgical Critical Care  [address] [facility] [address], [place] [address]  Phone: [phone]  Fax: [phone]  Follow Up Time: Routine    Morgan Hernández; PhD)  Cardiac Electrophysiology; Cardiovascular Disease; Internal Medicine  [facility] - Dept of Cardiology, [address] [place] [address]  Phone: [phone]  Fax: [phone]

## 2021-05-17 NOTE — PROGRESS NOTE ADULT - SUBJECTIVE AND OBJECTIVE BOX
Medicine Progress Note    Patient is a 65y old  Female who presents with a chief complaint of SAH and fractures of left-sided ribs, left iliac wing with hematoma, S/P closed reduction of left pinky PIP dislocation after trauma (17 May 2021 11:32)      SUBJECTIVE / OVERNIGHT EVENTS:  seen and examined after RRT  chart reviewed  RRT around 2 pm for for fall and head injury.   as per roommate patient was trying to get out wheelchair and fell  no LOC  patient denied any pain. answered yes/no qs as baseline  denied headaches/neck or back pain  labs and trauma w/u ordered by primary team    ADDITIONAL REVIEW OF SYSTEMS:  no fever/chills/CP/sob/palpitation/dizziness/ abd pain/nausea/vomiting/diarrhea/constipation/headaches. all other ROS neg    MEDICATIONS  (STANDING):  bethanechol 10 milliGRAM(s) Oral two times a day  escitalopram 10 milliGRAM(s) Oral daily  folic acid 1 milliGRAM(s) Oral daily  levothyroxine 112 MICROGram(s) Oral daily  lidocaine   Patch 1 Patch Transdermal daily  metoprolol succinate  milliGRAM(s) Oral daily  modafinil 100 milliGRAM(s) Oral <User Schedule>  multivitamin 1 Tablet(s) Oral daily  nystatin Powder 1 Application(s) Topical two times a day  oxyCODONE    IR 10 milliGRAM(s) Oral <User Schedule>  polyethylene glycol 3350 17 Gram(s) Oral daily  ramelteon 8 milliGRAM(s) Oral at bedtime  saccharomyces boulardii 250 milliGRAM(s) Oral two times a day  senna 2 Tablet(s) Oral at bedtime  tamsulosin 0.4 milliGRAM(s) Oral at bedtime  zinc oxide 40% Ointment 1 Application(s) Topical two times a day    MEDICATIONS  (PRN):  acetaminophen   Tablet .. 650 milliGRAM(s) Oral every 6 hours PRN Temp greater or equal to 38C (100.4F), Mild Pain (1 - 3)    CAPILLARY BLOOD GLUCOSE        I&O's Summary    16 May 2021 07:01  -  17 May 2021 07:00  --------------------------------------------------------  IN: 480 mL / OUT: 1251 mL / NET: -771 mL    17 May 2021 07:01  -  17 May 2021 16:02  --------------------------------------------------------  IN: 0 mL / OUT: 500 mL / NET: -500 mL        PHYSICAL EXAM:  Vital Signs Last 24 Hrs  T(C): 36.8 (17 May 2021 07:28), Max: 36.8 (16 May 2021 20:22)  T(F): 98.2 (17 May 2021 07:28), Max: 98.2 (16 May 2021 20:22)  HR: 66 (17 May 2021 07:28) (64 - 79)  BP: 104/68 (17 May 2021 07:28) (104/60 - 117/71)  BP(mean): --  RR: 15 (17 May 2021 07:28) (14 - 15)  SpO2: 95% (17 May 2021 07:28) (94% - 95%)    GENERAL: Not in distress. Alert    HEENT: dressing over forehead with blood tinged on pillows. dressing dry. laceration left frontal region as per primary team.  clear conjuctiva, MMM.   no discharge from ears or nose.   CARDIOVASCULAR: RRR S1, S2. No murmur/rubs/gallop  LUNGS: BLAE+, no rales, no wheezing, no rhonchi.    ABDOMEN: ND. Soft,  NT, no guarding / rebound / rigidity. BS normoactive. No CVA tenderness.    BACK: No spine tenderness. no paravertebral musclespasm or TP  EXTREMITIES: no edema. no leg or calf TP. no injury noted. ROM NT  SKIN: laceration as above  NEUROLOGIC: Alert. awake. .strength is symmetric, sensation intact, speech fluent.    PSYCHIATRIC: Calm.  No agitation.    LABS:                        12.6   11.76 )-----------( 204      ( 17 May 2021 15:10 )             38.8     05-17    138  |  104  |  23  ----------------------------<  127<H>  4.0   |  25  |  0.89    Ca    9.0      17 May 2021 15:10    TPro  6.9  /  Alb  3.3  /  TBili  0.4  /  DBili  x   /  AST  17  /  ALT  23  /  AlkPhos  347<H>  05-17    PT/INR - ( 17 May 2021 15:10 )   PT: 12.9 sec;   INR: 1.07 ratio         PTT - ( 17 May 2021 15:10 )  PTT:27.9 sec          COVID-19 PCR: NotDetec (16 May 2021 05:00)  COVID-19 PCR: NotDetec (10 May 2021 05:00)  COVID-19 PCR: NotDetec (04 May 2021 05:00)  COVID-19 PCR: NotDetec (27 Apr 2021 18:10)  COVID-19 PCR: NotDetec (26 Apr 2021 06:33)  COVID-19 PCR: NotDetec (22 Apr 2021 11:24)  COVID-19 PCR: NotDetec (19 Apr 2021 11:09)      RADIOLOGY & ADDITIONAL TESTS:  Imaging from Last 24 Hours:    Electrocardiogram/QTc Interval:    COORDINATION OF CARE:  Care Discussed with Consultants/Other Providers:

## 2021-05-17 NOTE — DISCHARGE NOTE PROVIDER - HOSPITAL COURSE
65F PMHx breast cancer s/p mastectomy, HTN, hypothyroidism, not on full anticoagulation/antiplatelets per EMS, presented to [facility] 4/19/21as a level 2 trauma activation after a fall from a flight of stairs. Patient only able to say "ow" in trauma bay, unable to provide further history. However per son, normal mentation at baseline. In the trauma bay, airway was intact with bilateral breath sounds, O2 saturation 100% on room air. Initially blood pressure was 140s systolic however on repeat 90s. NS@100 initiated. Secondary significant for left forehead laceration, left chest wall tenderness, left pelvic tenderness.     Ms. Arthur was hypotensive in the trauma bay and was transferred to the Surgical ICU after imaging was obtained. Her injury list is below.  (1) SAH, bilateral: stable on repeat imaging. Seven day course of Keppra for post-traumatic seizure prophylaxis.  (2) left 3-8 and right 7th rib fractures with occult left pneumothorax: pain control  (3) left iliac wing fracture with hematoma: required transfusion early in her ICU course, after which CBC were stable  (4) left forehead laceration s/p suture repair  (5) s/p successful closed reduction of left pinky PIP dislocation with buddy splint  (6) left clavicle fracture  (7) left nasal bone and left orbital rim fractures  (8) sternal fracture  Incidental findings: right renal lesion. Pt's  was notified and copy of the report given to him.     The patient was admitted to Trauma Surgery under SICU care.  Ortho was consulted for L. iliac wing fx, sternal fx, L. clavicle fx- recommended WBAT of the affected left lower extremity with walker, WBAT L shoulder, pendulum exercises okay, no lifting, sling for comfort, Encourage ROM of elbow/wrist/hand, PT/OT consult, NO urgent orthopedic surgical intervention at this time.    Neurosurgery consulted- recommended repeat CTH, which was stable.     PRS was consulted for non displaced left superior orbital rim and non displaced left nasal bone fracture--> No indication for operative fixation of these non displaced fractures; recommend ice to face to aid in swelling and analgesia.    She was placed on hemorrhage watch in the SICU given pelvic fracture and surrounding hematoma.  Behavior health was consulted for delirium.    Hand was consulted for L. pinky PIP dislocation- s/p closed reduction, rec Cont buddy taping for now.    4/21- CTH stable  - Added home lexapro  - Added NS @ 50cc/hr to supplement poor PO intake  - Lactate cleared (1.9)    4/22- Transfused 1u pRBC, Hct 20.9 -> 26.9  - Went into SVT, given adenosine x1 and started on metoprolol 25mg q12hr  - Added oxycodone 5mg q4hrs prn for increased pain control  - PO intake improved, IVL    4/23- EP was consulted for SVT, recc continue lopressor 25mg   Metoprolol PO increased from 25mg q12hrs to 25mg q6hrs.   - Added salt tabs 1g q8hrs and started on NS @ 50cc/hr for hyponatremia  - Spangler discontinued, passed TOV  - Added lovenox  - Cervical collar cleared by confrontational exam     4/24- Patient transferred from SICU to surgical floor in stable condition.  Discharge planning to acute rehab.     Stable from neurologic perspective consistent with mild TBI  Multiple rib fractures - breathing comfortable with multimodal pain control    On day of discharge, the patient was tolerating diet, working with PT well and pain controlled. The patient was medically stable for discharge to acute rehab with instructions for outpatient follow up 4/27/21. (27 Apr 2021 13:27)      Rehab course significant for significant low back pain. CT lumbar spine showed acute L3 fracture. Ortho consulted and recommended LSO brace. Plastic surgeon cleared patient to remove buddy tape on L 5th finger. Patient's  requested psych consult due to depression symptoms. Patient started on Lexapro 10mg daily, Modafinil 100mg QAM, and Ramelteon 8mg QHS for sleep. Urology consulted for continued urinary retention. Patient started on Flomax and Bethanechol. on 5/17/21 ~2PM, RRT called after patient found on floor. Patient's roommate witnessed patient attempting to stand up from wheelchair then falling over on left side along with wheelchair. +Head strike with obvious external bleeding on left side of head. No reported LOC. Patient able to answer simple yes/no questions at baseline level and denied headache, pain, dizziness, neck pain, numbness, new weakness. No periorbital edema or ecchymosis seen. Vitals after fall /84, HR 80, T99. Patient found to have hematoma on left frontal region 2x1.5cm with laceration and larger laceration near lateral margin of supraorbital area. Motor strength and sensation at baseline. CTH was unremarkable. CT cervical spine showed no acute fracture or dislocation. CT maxillofacial showed healing nasal bridge fracture. CT T/L spine changed to CT C/A/P per radiology to evaluate for ribs and pelvis in addition to spine. CT C/A/P did not show acute changes. Labs showed WBC 11.76, with stable Hb at 12.6. Coags WNL. Spoke with neurosurgeon on call for neurotrauma via transfer center and reviewed labs and imaging with him. Per NSGY, no indication for acute transfer, as patient currently stable with no new intracranial abnormalities. Plan for repeat labs and possible repeat CTH 5/18/21. Plastic surgery consulted to evaluate facial lacerations. Lovenox to be held for 48 hrs due to significant amount of blood from laceration.     Functional Status:  - ambulates 40 feet with WBQC min-mod assist, 2 step with 1 HR and mod assist, supervision eating, min assist grooming, mod assist UB/LB dressing, mod assist toileting, max assist bathing     65F PMHx breast cancer s/p mastectomy, HTN, hypothyroidism, not on full anticoagulation/antiplatelets per EMS, presented to [facility] 21as a level 2 trauma activation after a fall from a flight of stairs. Patient only able to say "ow" in trauma bay, unable to provide further history. However per son, normal mentation at baseline. In the trauma bay, airway was intact with bilateral breath sounds, O2 saturation 100% on room air. Initially blood pressure was 140s systolic however on repeat 90s. NS@100 initiated. Secondary significant for left forehead laceration, left chest wall tenderness, left pelvic tenderness.     Ms. Arthur was hypotensive in the trauma bay and was transferred to the Surgical ICU after imaging was obtained. She sustained multiple injuries, includin) SAH, bilateral 2) left 3-8 and right 7th rib fractures with occult left pneumothorax: 3) left iliac wing fracture with hematoma 4) left forehead laceration s/p suture repair 5) left pinky PIP dislocation s/p CR with buddy splint 6) left clavicle fracture 7) left nasal bone and left orbital rim fractures 8) sternal fracture. Ortho was consulted for L. iliac wing fx, sternal fx, L. clavicle fx- recommended WBAT of the affected left lower extremity with walker, WBAT L shoulder, pendulum exercises okay, no lifting, sling for comfort, Encourage ROM of elbow/wrist/hand, PT/OT consult, NO urgent orthopedic surgical intervention at this time. Neurosurgery consulted- recommended repeat CTH, which was stable.  PRS was consulted for non displaced left superior orbital rim and non displaced left nasal bone fracture--> No indication for operative fixation of these non displaced fractures; recommend ice to face to aid in swelling and analgesia. She was placed on hemorrhage watch in the SICU given pelvic fracture and surrounding hematoma.  Behavior health was consulted for delirium.    - CTH stable. She was restarted on her home dose of lexapro. On , she was transfused 1u pRBC, Hct 20.9 -> 26.9. also went into SVT, given adenosine x1 and started on metoprolol 25mg q12hr. EP was consulted for SVT, recommended continuation of lopressor 25mg     On day of discharge, the patient was tolerating diet, working with PT well and pain controlled. The patient was medically stable for discharge to acute rehab with instructions for outpatient follow up 21. (2021 13:27)      Rehab course significant for significant low back pain. CT lumbar spine showed acute L3 fracture. Ortho consulted and recommended LSO brace. Plastic surgeon cleared patient to remove buddy tape on L 5th finger. Patient's  requested psych consult due to depression symptoms. Patient started on Lexapro 10mg daily, Modafinil 100mg QAM, and Ramelteon 8mg QHS for sleep. Urology consulted for continued urinary retention. Patient started on Flomax and Bethanechol. Recommended avila placement after multiple failed TOVs    on 21 ~2PM, RRT called after patient found on floor. Patient's roommate witnessed patient attempting to stand up from wheelchair then falling over on left side along with wheelchair. +Head strike with obvious external bleeding on left side of head. No reported LOC. Patient able to answer simple yes/no questions at baseline level and denied headache, pain, dizziness, neck pain, numbness, new weakness. No periorbital edema or ecchymosis seen. Vitals after fall /84, HR 80, T99. Patient found to have hematoma on left frontal region 2x1.5cm with laceration and larger laceration near lateral margin of supraorbital area. Motor strength and sensation at baseline. CTH was unremarkable. CT cervical spine showed no acute fracture or dislocation. CT maxillofacial showed healing nasal bridge fracture. CT T/L spine changed to CT C/A/P per radiology to evaluate for ribs and pelvis in addition to spine. CT C/A/P did not show acute changes. Labs showed WBC 11.76, with stable Hb at 12.6. Coags WNL. Spoke with neurosurgeon on call for neurotrauma via transfer center and reviewed labs and imaging with him. Per NSGY, no indication for acute transfer, as patient currently stable with no new intracranial abnormalities. Plan for repeat labs and possible repeat CTH 21. Plastic surgery consulted to evaluate facial lacerations. Lovenox to be held for 48 hrs due to significant amount of blood from laceration.     Functional Status:  - ambulates 40 feet with WBQC min-mod assist, 2 step with 1 HR and mod assist, supervision eating, min assist grooming, mod assist UB/LB dressing, mod assist toileting, max assist bathing     65F PMHx breast cancer s/p mastectomy, HTN, hypothyroidism, not on full anticoagulation/antiplatelets per EMS, presented to [facility] 21as a level 2 trauma activation after a fall from a flight of stairs. Patient only able to say "ow" in trauma bay, unable to provide further history. However per son, normal mentation at baseline. In the trauma bay, airway was intact with bilateral breath sounds, O2 saturation 100% on room air. Initially blood pressure was 140s systolic however on repeat 90s. NS@100 initiated. Secondary significant for left forehead laceration, left chest wall tenderness, left pelvic tenderness.     Ms. Arthur was hypotensive in the trauma bay and was transferred to the Surgical ICU after imaging was obtained. She sustained multiple injuries, includin) SAH, bilateral 2) left 3-8 and right 7th rib fractures with occult left pneumothorax: 3) left iliac wing fracture with hematoma 4) left forehead laceration s/p suture repair 5) left pinky PIP dislocation s/p CR with buddy splint 6) left clavicle fracture 7) left nasal bone and left orbital rim fractures 8) sternal fracture. Ortho was consulted for L. iliac wing fx, sternal fx, L. clavicle fx- recommended WBAT of the affected left lower extremity with walker, WBAT L shoulder, pendulum exercises okay, no lifting, sling for comfort, Encourage ROM of elbow/wrist/hand, PT/OT consult, NO urgent orthopedic surgical intervention at this time. Neurosurgery consulted- recommended repeat CTH, which was stable.  PRS was consulted for non displaced left superior orbital rim and non displaced left nasal bone fracture--> No indication for operative fixation of these non displaced fractures; recommend ice to face to aid in swelling and analgesia. She was placed on hemorrhage watch in the SICU given pelvic fracture and surrounding hematoma.  Behavior health was consulted for delirium.    - CTH stable. She was restarted on her home dose of lexapro. On , she was transfused 1u pRBC, Hct 20.9 -> 26.9. also went into SVT, given adenosine x1 and started on metoprolol 25mg q12hr. EP was consulted for SVT, recommended continuation of lopressor 25mg     On day of discharge, the patient was tolerating diet, working with PT well and pain controlled. The patient was medically stable for discharge to acute rehab with instructions for outpatient follow up 21. (2021 13:27)      Rehab course significant for significant low back pain. CT lumbar spine showed acute L3 fracture. Ortho consulted and recommended LSO brace. Plastic surgeon cleared patient to remove buddy tape on L 5th finger. Patient's  requested psych consult due to depression symptoms. Patient started on Lexapro 10mg daily, Modafinil 100mg QAM, and Ramelteon 8mg QHS for sleep. Urology consulted for continued urinary retention. Patient started on Flomax and Bethanechol. Recommended avila placement after multiple failed TOVs    on 21 ~2PM, RRT called after patient found on floor. Patient's roommate witnessed patient attempting to stand up from wheelchair then falling over on left side along with wheelchair. +Head strike with obvious external bleeding on left side of head. No reported LOC. Patient able to answer simple yes/no questions at baseline level and denied headache, pain, dizziness, neck pain, numbness, new weakness. No periorbital edema or ecchymosis seen. Vitals after fall /84, HR 80, T99. Patient found to have hematoma on left frontal region 2x1.5cm with laceration and larger laceration near lateral margin of supraorbital area. Motor strength and sensation at baseline. CTH was unremarkable. CT cervical spine showed no acute fracture or dislocation. CT maxillofacial showed healing nasal bridge fracture. CT T/L spine changed to CT C/A/P per radiology to evaluate for ribs and pelvis in addition to spine. CT C/A/P did not show acute changes. Labs showed WBC 11.76, with stable Hb at 12.6. Coags WNL. Spoke with neurosurgeon on call for neurotrauma via transfer center and reviewed labs and imaging with him. Per NSGY, no indication for acute transfer, as patient currently stable with no new intracranial abnormalities. Repeat CTH 21 stable. Plastic surgery was consulted to evaluate facial lacerations, sutured and covered with steristrips, to be removed and reevaluated by plastics 21. Lovenox was held for 48 hrs due to significant amount of blood from laceration and restarted without complications. Patient likely fell due to discomfort/pain. Avila reinserted due to failed TOV. Due to worsening sleep, Trazodone 50mg was started. Patient had another TOV starting 21 but avila re-inserted 21 due to no spontaneous void. Urology has been following the case.     Functional Status:  - ambulates 40 feet with WBQC min-mod assist, 2 step with 1 HR and mod assist, supervision eating, min assist grooming, mod assist UB/LB dressing, mod assist toileting, max assist bathing    Patient was medically stable for discharge to [facility]. All questions from patient and family addressed.      65F PMHx breast cancer s/p mastectomy, HTN, hypothyroidism, not on full anticoagulation/antiplatelets per EMS, presented to [facility] 21as a level 2 trauma activation after a fall from a flight of stairs. Patient only able to say "ow" in trauma bay, unable to provide further history. However per son, normal mentation at baseline. In the trauma bay, airway was intact with bilateral breath sounds, O2 saturation 100% on room air. Initially blood pressure was 140s systolic however on repeat 90s. NS@100 initiated. Secondary significant for left forehead laceration, left chest wall tenderness, left pelvic tenderness.     Ms. Arthur was hypotensive in the trauma bay and was transferred to the Surgical ICU after imaging was obtained. She sustained multiple injuries, includin) SAH, bilateral 2) left 3-8 and right 7th rib fractures with occult left pneumothorax: 3) left iliac wing fracture with hematoma 4) left forehead laceration s/p suture repair 5) left pinky PIP dislocation s/p CR with buddy splint 6) left clavicle fracture 7) left nasal bone and left orbital rim fractures 8) sternal fracture. Ortho was consulted for L. iliac wing fx, sternal fx, L. clavicle fx- recommended WBAT of the affected left lower extremity with walker, WBAT L shoulder, pendulum exercises okay, no lifting, sling for comfort, Encourage ROM of elbow/wrist/hand, PT/OT consult, NO urgent orthopedic surgical intervention at this time. Neurosurgery consulted- recommended repeat CTH, which was stable.  PRS was consulted for non displaced left superior orbital rim and non displaced left nasal bone fracture--> No indication for operative fixation of these non displaced fractures; recommend ice to face to aid in swelling and analgesia. She was placed on hemorrhage watch in the SICU given pelvic fracture and surrounding hematoma.  Behavior health was consulted for delirium.    - CTH stable. She was restarted on her home dose of lexapro. On , she was transfused 1u pRBC, Hct 20.9 -> 26.9. also went into SVT, given adenosine x1 and started on metoprolol 25mg q12hr. EP was consulted for SVT, recommended continuation of lopressor 25mg     On day of discharge, the patient was tolerating diet, working with PT well and pain controlled. The patient was medically stable for discharge to acute rehab with instructions for outpatient follow up 21. (2021 13:27)      Rehab course significant for significant low back pain. CT lumbar spine showed acute L3 fracture. Ortho consulted and recommended LSO brace. Plastic surgeon cleared patient to remove buddy tape on L 5th finger. Patient's  requested psych consult due to depression symptoms. Patient started on Lexapro 10mg daily, Modafinil 100mg QAM, and Ramelteon 8mg QHS for sleep. Urology consulted for continued urinary retention. Patient started on Flomax and Bethanechol. Recommended avila placement after multiple failed TOVs    on 21 ~2PM, RRT called after patient found on floor. Patient's roommate witnessed patient attempting to stand up from wheelchair then falling over on left side along with wheelchair. +Head strike with obvious external bleeding on left side of head. No reported LOC. Patient able to answer simple yes/no questions at baseline level and denied headache, pain, dizziness, neck pain, numbness, new weakness. No periorbital edema or ecchymosis seen. Vitals after fall /84, HR 80, T99. Patient found to have hematoma on left frontal region 2x1.5cm with laceration and larger laceration near lateral margin of supraorbital area. Motor strength and sensation at baseline. CTH was unremarkable. CT cervical spine showed no acute fracture or dislocation. CT maxillofacial showed healing nasal bridge fracture. CT T/L spine changed to CT C/A/P per radiology to evaluate for ribs and pelvis in addition to spine. CT C/A/P did not show acute changes. Labs showed WBC 11.76, with stable Hb at 12.6. Coags WNL. Spoke with neurosurgeon on call for neurotrauma via transfer center and reviewed labs and imaging with him. Per NSGY, no indication for acute transfer, as patient currently stable with no new intracranial abnormalities. Repeat CTH 21 stable. Plastic surgery was consulted to evaluate facial lacerations, sutured and covered with steristrips, to be removed and reevaluated by plastics 21. Lovenox was held for 48 hrs due to significant amount of blood from laceration and restarted without complications. Patient likely fell due to discomfort/pain. Avila reinserted due to failed TOV. Due to worsening sleep, Trazodone 50mg was started. Patient had another TOV starting 21 but avila re-inserted 21 due to no spontaneous void. Urology has been following the case.     Toprol XL was decreased on 21 then discontinued 21 due to soft BP. At ~10:20 PM, patient began having palpitations. PM vitals before medications were /67, HR 150s, 95% SpO2, afebrile. Patient also c/o lightheadedness and dizziness. Denied chest pain or SOB. EKG stat showed SVT, . Discussed with hospitalist and gave patient Lopressor 25mg PO stat. Patient also ordered for CTA to r/o PE and then transfer to telemetry for close monitoring and further medications for SVT. Spoke with patient's son, Henok, to inform him of events and transfer. Attempted to contact patient's , Silverio, but unable to reach him via number listed in patient's contacts ([phone]) and voicemail full. Patient's son provided Silverio's cell phone ([phone]), but also unable to reach and voicemail full.      Functional Status:  - ambulates 40 feet with WBQC min-mod assist, 2 step with 1 HR and mod assist, supervision eating, min assist grooming, mod assist UB/LB dressing, mod assist toileting, max assist bathing

## 2021-05-17 NOTE — CONSULT NOTE ADULT - ASSESSMENT
H/o SAH and prior trauma.  S/p trauma to face after fall today s/p repair of 2.5 cm midforehead laceration and 3.5 cm left forehead lacerations  -pressure dressing over mid forehead to be removed after 2 hours  -bacitracin ointment to sutures daily  -suture removal 7-10 days
66 yo woman with PMHx breast cancer s/p mastectomy, HTN, Hypothyroidism admitted to [facility] after hospital course at [facility] after a fall from a flight of stairs.    #Impaired gait, mobility, ADL  #Traumatic Fall  #SAH, left-sided rib fractures, left iliac wing fracture with hematoma, left forehead laceration, s/p successful closed reduction of left pinky PIP dislocation.    - Comprehensive rehab program of PT/OT/SLP - 3 hours a day, 5 days a week  - Continue Keppra for 7 days total  - Continue Lexapro  - Monitor H/H  - Fall, Seizure, Bleeding Precautions  - Bowel regimen as per primary team  - Pain meds as per primary team     #Acute Hypoxic Respiratory Failure likely due to Acute B/L rib fractures, and small pneumothorax  - Resolving. RA 96% this AM  - Incentive spirometry.   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 Hernández [phone]. (elective ablation)    #Hypothyroidism  - Continue Synthroid  - TSH 1.2 (4/22)    #DVT PPX - Lovenox  
Rehab s/p fall and SAH. On caths for retention / neurogenic bladder. On Bethanechol.    - Continue Bethanechol  - Start Flomax 0.4 mg daily  - monitor voiding  - straight caths as needed for high PVR

## 2021-05-17 NOTE — DISCHARGE NOTE PROVIDER - DETAILS OF MALNUTRITION DIAGNOSIS/DIAGNOSES
This patient has been assessed with a concern for Malnutrition and was treated during this hospitalization for the following Nutrition diagnosis/diagnoses:     -  05/23/2021: Moderate protein-calorie malnutrition

## 2021-05-17 NOTE — PROCEDURE NOTE - NSLAC1DETAILSPROC_SKIN_A_CORE
wound explored to base in bloodless field/no foreign body/extensive debridement/enlargement of wound

## 2021-05-17 NOTE — CONSULT NOTE ADULT - SUBJECTIVE AND OBJECTIVE BOX
CC:  Patient is a 65y old  Female who presents for AR on BIU with a diagnoses of SAH and fractures of left-sided ribs, left iliac wing with hematoma, S/P closed reduction of left pinky PIP dislocation after trauma.  Pt was admitted 21    HPI:  Per report, pt may have fallen from chair, full body CT scan performed.  Patient has trauma to mid and left forehead    65F PMHx breast cancer s/p mastectomy, HTN, hypothyroidism, not on full anticoagulation/antiplatelets per EMS, presented to [facility] 21as a level 2 trauma activation after a fall from a flight of stairs. Patient only able to say "ow" in trauma bay, unable to provide further history. However per son, normal mentation at baseline. In the trauma bay, airway was intact with bilateral breath sounds, O2 saturation 100% on room air. Initially blood pressure was 140s systolic however on repeat 90s. NS@100 initiated. Secondary significant for left forehead laceration, left chest wall tenderness, left pelvic tenderness.     Ms. Arthur was hypotensive in the trauma bay and was transferred to the Surgical ICU after imaging was obtained. Her injury list is below.  (1) SAH, bilateral: stable on repeat imaging. Seven day course of Keppra for post-traumatic seizure prophylaxis.  (2) left 3-8 and right 7th rib fractures with occult left pneumothorax: pain control  (3) left iliac wing fracture with hematoma: required transfusion early in her ICU course, after which CBC were stable  (4) left forehead laceration s/p suture repair  (5) s/p successful closed reduction of left pinky PIP dislocation with delfin splint  (6) left clavicle fracture  (7) left nasal bone and left orbital rim fractures  (8) sternal fracture  Incidental findings: right renal lesion. Pt's  was notified and copy of the report given to him.     The patient was admitted to Trauma Surgery under SICU care.  Ortho was consulted for L. iliac wing fx, sternal fx, L. clavicle fx- recommended WBAT of the affected left lower extremity with walker, WBAT L shoulder, pendulum exercises okay, no lifting, sling for comfort, Encourage ROM of elbow/wrist/hand, PT/OT consult, NO urgent orthopedic surgical intervention at this time.    Neurosurgery consulted- recommended repeat CTH, which was stable.     PRS was consulted for non displaced left superior orbital rim and non displaced left nasal bone fracture--> No indication for operative fixation of these non displaced fractures; recommend ice to face to aid in swelling and analgesia.    She was placed on hemorrhage watch in the SICU given pelvic fracture and surrounding hematoma.  Behavior health was consulted for delirium.    Hand was consulted for L. erlinda PIP dislocation- s/p closed reduction, rec Cont delfin taping for now.    - CTH stable  - Added home lexapro  - Added NS @ 50cc/hr to supplement poor PO intake  - Lactate cleared (1.9)    - Transfused 1u pRBC, Hct 20.9 -> 26.9  - Went into SVT, given adenosine x1 and started on metoprolol 25mg q12hr  - Added oxycodone 5mg q4hrs prn for increased pain control  - PO intake improved, IVL    - EP was consulted for SVT, recc continue lopressor 25mg   Metoprolol PO increased from 25mg q12hrs to 25mg q6hrs.   - Added salt tabs 1g q8hrs and started on NS @ 50cc/hr for hyponatremia  - Spangler discontinued, passed TOV  - Added lovenox  - Cervical collar cleared by confrontational exam     - Patient transferred from SICU to surgical floor in stable condition.  Discharge planning to acute rehab.     Stable from neurologic perspective consistent with mild TBI  Multiple rib fractures - breathing comfortable with multimodal pain control    On day of discharge, the patient was tolerating diet, working with PT well and pain controlled. The patient was medically stable for discharge to acute rehab with instructions for outpatient follow up 21. 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    C Section    Abnormality, eye  h/o left eye vitrectomy    S/P D&amp;C (status post dilation and curettage)    H/O left mastectomy        Allergies    Tapazole (Hives)    Intolerances        SOCIAL HISTORY          Smoking: Yes [ ]  No [ ]   ______pk yrs          ETOH  Yes [ ]  No [ ]  Social [ ]          DRUGS:  Yes [ ]  No [ ]  if so what______________    FAMILY HISTORY:      MEDICATIONS  (STANDING):  bethanechol 10 milliGRAM(s) Oral two times a day  escitalopram 10 milliGRAM(s) Oral daily  folic acid 1 milliGRAM(s) Oral daily  levothyroxine 112 MICROGram(s) Oral daily  lidocaine   Patch 1 Patch Transdermal daily  lidocaine 1%/epinephrine 1:100,000 Inj 15 milliLiter(s) Local Injection once  metoprolol succinate  milliGRAM(s) Oral daily  modafinil 100 milliGRAM(s) Oral <User Schedule>  multivitamin 1 Tablet(s) Oral daily  nystatin Powder 1 Application(s) Topical two times a day  oxyCODONE    IR 10 milliGRAM(s) Oral <User Schedule>  polyethylene glycol 3350 17 Gram(s) Oral daily  ramelteon 8 milliGRAM(s) Oral at bedtime  saccharomyces boulardii 250 milliGRAM(s) Oral two times a day  senna 2 Tablet(s) Oral at bedtime  tamsulosin 0.4 milliGRAM(s) Oral at bedtime  zinc oxide 40% Ointment 1 Application(s) Topical two times a day    MEDICATIONS  (PRN):  acetaminophen   Tablet .. 650 milliGRAM(s) Oral every 6 hours PRN Temp greater or equal to 38C (100.4F), Mild Pain (1 - 3)         Review of systems:  General:  no fever or chills  Pulmonary:  no SOB  Cardiac:  denies chest pain, palpitations  GI:  no nausea, vomitting, or abddominal pain  :  no increase frequency, or burning  Heme:  no easy bruising with minor trauma  Musculoskeletal:  No history of back pain or trauma  Skin:  no history of rashes, injury, trauma  Neuro:  no weakness         Vital Signs Last 24 Hrs  T(C): 36.9 (17 May 2021 20:25), Max: 37.3 (17 May 2021 14:10)  T(F): 98.4 (17 May 2021 20:25), Max: 99.2 (17 May 2021 14:10)  HR: 94 (17 May 2021 20:25) (64 - 94)  BP: 111/73 (17 May 2021 20:25) (104/60 - 136/84)  BP(mean): --  RR: 15 (17 May 2021 20:25) (14 - 15)  SpO2: 95% (17 May 2021 20:25) (94% - 96%)    Physical Exam:    General:   no distress, slightly agitated     Skin:  contusion fo mid forehead and left side forehead with complex laceration of mid forehead showing 2 parallel lines of injury with . 5 cm of intervening skin appeared nonviable.  large hematoma found at time of excision (see procedure note).  Also 3.5 cm deep laceration through muscle 2 cm superior to left eyebrow (see procedure note)          LABS:                        12.6   11.76 )-----------( 204      ( 17 May 2021 15:10 )             38.8         138  |  104  |  23  ----------------------------<  127<H>  4.0   |  25  |  0.89    Ca    9.0      17 May 2021 15:10    TPro  6.9  /  Alb  3.3  /  TBili  0.4  /  DBili  x   /  AST  17  /  ALT  23  /  AlkPhos  347<H>      PT/INR - ( 17 May 2021 15:10 )   PT: 12.9 sec;   INR: 1.07 ratio         PTT - ( 17 May 2021 15:10 )  PTT:27.9 sec  Urinalysis Basic - ( 17 May 2021 17:35 )    Color: Yellow / Appearance: Clear / S.015 / pH: x  Gluc: x / Ketone: Negative  / Bili: Negative / Urobili: Negative   Blood: x / Protein: Negative / Nitrite: Negative   Leuk Esterase: Negative / RBC: x / WBC x   Sq Epi: x / Non Sq Epi: x / Bacteria: x        RADIOLOGY & ADDITIONAL STUDIES:  EXAM:  CT MAXILLOFACIAL      PROCEDURE DATE:  2021        INTERPRETATION:  Maxillofacial CT without contrast  Comparison 21  History injury    There is a healing tiny nondisplaced fracture of the base of the left nasal bridge extending to the orbital rim. No new fractures are identified. There is resolution of left-sided periorbital soft tissue swelling. The deep orbital contents are within normal limits. There is clearing of the paranasal sinuses    IMPRESSION::  Improved without new abnormality              NILESH VILLALPANDO MD; Attending Radiologist  This document has been electronically signed. May 17 2021  3:39PM  EXAM:  CT BRAIN      PROCEDURE DATE:  2021        INTERPRETATION:  HISTORY: Injury    Comparison 21    Evaluation demonstrates no evidence of mass-effect or midline shift. No intraparenchymal mass lesions or hemorrhage is identified. Previous hemorrhage is resolved. There is no evidence of hydrocephalus. No extra-axial collections are noted.    The bone windows demonstrate no gross osseous abnormalities.    Impression:  1. Unremarkable noncontrast CT scan of the brain.                    NILESH VILLALPANDO MD; Attending Radiologist  This document has been electronically signed. May 17 2021  3:20PM  EXAM:  CT CERVICAL SPINE      PROCEDURE DATE:  2021        INTERPRETATION:  CT cervical spine without contrast    History injury    There is no fracture or acute subluxation or prevertebral soft tissue widening. There is severe multilevel degenerative disc and endplate change and to a lesser degree facet arthropathy without significant spinal stenosis.    IMPRESSION:  No acute findings              NILESH VILLALPANDO MD; Attending Radiologist  This document has been electronically signed. May 17 2021  3:31PM    Risks, benefits, and alternatives to treatment discussed. All questions answered with understanding.    Procedure Performed:  (  )Yes     (  )No  Name of Procedure:      [  ]Debridement     [  ]I&D    [x  ]Laceration Repair, see procedure note     [  ]Other:  (  )partial thickness     (  )full thickness     (  )subcutaneous     (  )muscle/tendon     (  )bone  (  )sharp     (  )surgical  
  HPI:  65F PMHx breast cancer s/p mastectomy, HTN, hypothyroidism, not on full anticoagulation/antiplatelets per EMS, presented to [facility] 21as a level 2 trauma activation after a fall from a flight of stairs. Found to have SAH and fractures of left-sided ribs, left iliac wing with hematoma, S/P closed reduction of left pinky PIP dislocation after trauma.    Now at [facility] for rehab.    Has urinary retention - on caths for high PVR's. On Bethanechol.        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     MEDICATIONS  (STANDING):  bethanechol 10 milliGRAM(s) Oral two times a day  enoxaparin Injectable 40 milliGRAM(s) SubCutaneous every 24 hours  escitalopram 10 milliGRAM(s) Oral daily  folic acid 1 milliGRAM(s) Oral daily  levothyroxine 112 MICROGram(s) Oral daily  lidocaine   Patch 1 Patch Transdermal daily  metoprolol succinate  milliGRAM(s) Oral daily  modafinil 100 milliGRAM(s) Oral <User Schedule>  multivitamin 1 Tablet(s) Oral daily  nitrofurantoin monohydrate/macrocrystals (MACROBID) 100 milliGRAM(s) Oral two times a day  nystatin Powder 1 Application(s) Topical two times a day  oxyCODONE    IR 10 milliGRAM(s) Oral <User Schedule>  polyethylene glycol 3350 17 Gram(s) Oral daily  ramelteon 8 milliGRAM(s) Oral at bedtime  senna 2 Tablet(s) Oral at bedtime  zinc oxide 40% Ointment 1 Application(s) Topical two times a day    MEDICATIONS  (PRN):  acetaminophen   Tablet .. 650 milliGRAM(s) Oral every 6 hours PRN Temp greater or equal to 38C (100.4F), Mild Pain (1 - 3)      Allergies    Tapazole (Hives)    SOCIAL HISTORY: No illicit drug use      Vital Signs Last 24 Hrs  T(C): 36.6 (12 May 2021 09:40), Max: 37.4 (11 May 2021 20:46)  T(F): 97.9 (12 May 2021 09:40), Max: 99.3 (11 May 2021 20:46)  HR: 68 (12 May 2021 09:40) (66 - 72)  BP: 105/68      PHYSICAL EXAM:    Constitutional: NAD, well-developed  Respiratory: No accessory respiratory muscle use  Abd: Soft, NT/ND   Neurological: A/O x 3  Psychiatric: Normal mood, normal affect        LABS:    Urinalysis Basic - ( 12 May 2021 11:15 )    Color: Yellow / Appearance: very cloudy / S.020 / pH: x  Gluc: x / Ketone: Negative  / Bili: Negative / Urobili: Negative   Blood: x / Protein: 15 / Nitrite: Positive   Leuk Esterase: Moderate / RBC: 11-25 /HPF / WBC >50 /HPF   Sq Epi: x / Non Sq Epi: Neg.-Few / Bacteria: Many /HPF              
Patient is a 65y old  Female who presents with a chief complaint of SAH and fractures of left-sided ribs, left iliac wing with hematoma, S/P closed reduction of left pinky PIP dislocation after trauma (28 Apr 2021 07:24)      HPI:  65F PMHx breast cancer s/p mastectomy, HTN, hypothyroidism, not on full anticoagulation/antiplatelets per EMS, presented to [facility] 4/19/21as a level 2 trauma activation after a fall from a flight of stairs. Patient only able to say "ow" in trauma bay, unable to provide further history. However per son, normal mentation at baseline. In the trauma bay, airway was intact with bilateral breath sounds, O2 saturation 100% on room air. Initially blood pressure was 140s systolic however on repeat 90s. NS@100 initiated. Secondary significant for left forehead laceration, left chest wall tenderness, left pelvic tenderness.     Ms. Arthur was hypotensive in the trauma bay and was transferred to the Surgical ICU after imaging was obtained. Her injury list is below.  (1) SAH, bilateral: stable on repeat imaging. Seven day course of Keppra for post-traumatic seizure prophylaxis.  (2) left 3-8 and right 7th rib fractures with occult left pneumothorax: pain control  (3) left iliac wing fracture with hematoma: required transfusion early in her ICU course, after which CBC were stable  (4) left forehead laceration s/p suture repair  (5) s/p successful closed reduction of left pinky PIP dislocation with delfin splint  (6) left clavicle fracture  (7) left nasal bone and left orbital rim fractures  (8) sternal fracture  Incidental findings: right renal lesion. Pt's  was notified and copy of the report given to him.     The patient was admitted to Trauma Surgery under SICU care.  Ortho was consulted for L. iliac wing fx, sternal fx, L. clavicle fx- recommended WBAT of the affected left lower extremity with walker, WBAT L shoulder, pendulum exercises okay, no lifting, sling for comfort, Encourage ROM of elbow/wrist/hand, PT/OT consult, NO urgent orthopedic surgical intervention at this time.    Neurosurgery consulted- recommended repeat CTH, which was stable.     PRS was consulted for non displaced left superior orbital rim and non displaced left nasal bone fracture--> No indication for operative fixation of these non displaced fractures; recommend ice to face to aid in swelling and analgesia.    She was placed on hemorrhage watch in the SICU given pelvic fracture and surrounding hematoma.  Behavior health was consulted for delirium.    Hand was consulted for L. pinky PIP dislocation- s/p closed reduction, rec Cont delfin taping for now.    4/21- CTH stable  - Added home lexapro  - Added NS @ 50cc/hr to supplement poor PO intake  - Lactate cleared (1.9)    4/22- Transfused 1u pRBC, Hct 20.9 -> 26.9  - Went into SVT, given adenosine x1 and started on metoprolol 25mg q12hr  - Added oxycodone 5mg q4hrs prn for increased pain control  - PO intake improved, IVL    4/23- EP was consulted for SVT, recc continue lopressor 25mg   Metoprolol PO increased from 25mg q12hrs to 25mg q6hrs.   - Added salt tabs 1g q8hrs and started on NS @ 50cc/hr for hyponatremia  - Spangler discontinued, passed TOV  - Added lovenox  - Cervical collar cleared by confrontational exam     4/24- Patient transferred from SICU to surgical floor in stable condition.  Discharge planning to acute rehab.     Stable from neurologic perspective consistent with mild TBI  Multiple rib fractures - breathing comfortable with multimodal pain control    On day of discharge, the patient was tolerating diet, working with PT well and pain controlled. The patient was medically stable for discharge to acute rehab with instructions for outpatient follow up 4/27/21. 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    C Section    Abnormality, eye  h/o left eye vitrectomy    S/P D&amp;C (status post dilation and curettage)    H/O left mastectomy      FAMILY HISTORY:  Denies family history of diabetes mellitus II, hypertension, hyperlipidemia, or cancers      SOCIAL HISTORY:  Substance Use (street drugs): (  ) never used  Tobacco Usage:  (   ) never smoked   Alcohol Usage: (  ) denies      ALLERGIES:  Tapazole (Hives)  No Known Intolerances      MEDICATIONS:  nystatin Powder 1 Application(s) Topical two times a day  zinc oxide 40% Ointment 1 Application(s) Topical two times a day      REVIEW OF SYSTEMS:  CONSTITUTIONAL: No fever, weight loss, or fatigue  EYES: No eye pain, visual disturbances, or discharge  RESPIRATORY: No cough, wheezing, chills or hemoptysis; No shortness of breath  CARDIOVASCULAR: No chest pain, palpitations, dizziness, or leg swelling  GASTROINTESTINAL: No abdominal or epigastric pain. No nausea, vomiting, or hematemesis; No diarrhea or constipation. No melena or hematochezia.  GENITOURINARY: No dysuria, frequency, hematuria, or incontinence  NEUROLOGICAL: No headaches, memory loss, loss of strength, numbness, or tremors  SKIN: No itching, burning, rashes, or lesions   MUSCULOSKELETAL: No joint pain or swelling; No muscle, back, or extremity pain  PSYCHIATRIC: No depression, anxiety, mood swings, or difficulty sleeping  ALL OTHER SYSTEMS REVIEWED AND ARE NEGATIVE      VITAL SIGNS:  T(C): 36.6 (04-28-21 @ 07:44), Max: 37.6 (04-27-21 @ 12:43)  HR: 60 (04-28-21 @ 07:44) (60 - 87)  BP: 115/78 (04-28-21 @ 07:44) (100/65 - 144/84)  RR: 14 (04-28-21 @ 08:16) (14 - 24)  SpO2: 96% (04-28-21 @ 08:16) (93% - 98%)  Wt(kg): --Vital Signs Last 24 Hrs  T(C): 36.6 (28 Apr 2021 07:44), Max: 37.6 (27 Apr 2021 12:43)  T(F): 97.9 (28 Apr 2021 07:44), Max: 99.7 (27 Apr 2021 12:43)  HR: 60 (28 Apr 2021 07:44) (60 - 87)  BP: 115/78 (28 Apr 2021 07:44) (100/65 - 144/84)  BP(mean): --  RR: 14 (28 Apr 2021 08:16) (14 - 24)  SpO2: 96% (28 Apr 2021 08:16) (93% - 98%)    PHYSICAL EXAM:  GENERAL: NAD  HENT:  B/L raccoon eyes, laceration over left eye, Normocephalic; No tonsillar erythema, exudates, ulcers, or enlargement; Moist mucous membranes  EYES: EOMI, PERRLA, conjunctiva and sclera clear; no lid-lag  NECK: Supple, No JVD, Normal thyroid  CHEST/LUNG: Clear to percussion bilaterally; No rales, rhonchi, wheezing, or rubs; normal respiratory effort, no intercostal retractions  HEART: Regular rate and rhythm; No murmurs, rubs, or gallops  ABDOMEN: Soft, Nontender, Nondistended; Bowel sounds present; No HSM  EXTREMITIES:  2+ Peripheral Pulses, LUE in sling, left pinky in splint  LYMPH: No lymphadenopathy noted  SKIN: bruising to face and arms B/L; normal temperature and turgor   PSYCH: Appropriate affect; Alert & Awake    LABS:                        9.1    9.85  )-----------( 218      ( 28 Apr 2021 05:09 )             27.0     04-28    145  |  110<H>  |  16  ----------------------------<  125<H>  3.6   |  25  |  0.63    Ca    8.2<L>      28 Apr 2021 05:09    TPro  5.7<L>  /  Alb  2.6<L>  /  TBili  1.0  /  DBili  x   /  AST  21  /  ALT  29  /  AlkPhos  94  04-28      CAPILLARY BLOOD GLUCOSE      Consultant(s) Previous Notes Reviewed:  [x ] YES  Care Discussed with Consultants/Other Providers [ x] YES  Imaging Personally Reviewed:  [X ] YES

## 2021-05-17 NOTE — DISCHARGE NOTE PROVIDER - PROVIDER TOKENS
PROVIDER:[TOKEN:[[id number]:[TOKEN:[[id number]]],PROVIDER:[TOKEN:[[id number]]],PROVIDER:[TOKEN:[[id number]]],PROVIDER:[TOKEN:[[id number]]],PROVIDER:[TOKEN:[[id number]]],PROVIDER:[TOKEN:[[id number]]]

## 2021-05-17 NOTE — PROVIDER CONTACT NOTE (FALL NOTIFICATION) - ASSESSMENT
Noted and I agree with assessment/recommendations.    Pt found conscious. Vital signs stable. Pt states no pain.

## 2021-05-17 NOTE — CHART NOTE - NSCHARTNOTEFT_GEN_A_CORE
Rapid response called after patient found on floor. +head strike with obvious external bleeding left side. No periorbital edema or ecchymoses. Denies H/A, dizziness, CP, palpitations, new neck pain, radiating pains, numbness or new weakness. Able to answer simple yes/no questions at baseline level, consistent in responses to questions above. Per roommate, patient was trying to get out of wheelchair when fell      vitals: /84 HR 80 T 99  alert, aware of name, place, cannot relay what happened    On evaluation, patient noted to have hematoma left frontal region 2 x 1.5 cm with laceration, and larger laceration near lateral margin supraorbital area. EOMI.  moves BUE and LE ROm WFL, no pain with AROM and PROM, no swelling, or obvious dislocation.   bicep and shoulder elevation 4+-5-/5 bilaterally elbow flexion 5/5 bilateral grasp 5/5 wrist flexion and extension 5/5  ankle PF and DF 5/5 bilaterally  quad 4+/5  negative SLR  no sensory deficits LT    1) Head CT stat  2) Maxillofacial CT stat  3) C/T/L spine CT stat--> changed to Chest and A/P CT per rads department recommendation to evaluate for ribs, pelvis as well as spine  4) neurochecks q hour  5) 1:1 constant observation  6) CBC BMP Pt/INR EKG ordered  7) medical hold  8) [facility] transfer center called for possible transfer to NSGY-trauma service for monitoring  9) Plastics for evaluation suturing laceration  10)  Silverio Arthur, physician, notified [phone] Rapid response called after patient found on floor. +head strike with obvious external bleeding left side. No periorbital edema or ecchymoses. Denies H/A, dizziness, CP, palpitations, new neck pain, radiating pains, numbness or new weakness. Able to answer simple yes/no questions at baseline level, consistent in responses to questions above. Per roommate, patient was trying to get out of wheelchair when fell      vitals: /84 HR 80 T 99  alert, aware of name, place, cannot relay what happened    On evaluation, patient noted to have hematoma left frontal region 2 x 1.5 cm with laceration, and larger laceration near lateral margin supraorbital area. EOMI.  moves BUE and LE ROm WFL, no pain with AROM and PROM, no swelling, or obvious dislocation.   bicep and shoulder elevation 4+-5-/5 bilaterally elbow flexion 5/5 bilateral grasp 5/5 wrist flexion and extension 5/5  ankle PF and DF 5/5 bilaterally  quad 4+/5  negative SLR  no sensory deficits LT    Patient moved back to bed with spinal precautions and     1) Head CT stat  2) Maxillofacial CT stat  3) C/T/L spine CT stat--> changed to Chest and A/P CT per rads department recommendation to evaluate for ribs, pelvis as well as spine  4) neurochecks q hour  5) 1:1 constant observation  6) CBC BMP Pt/INR EKG ordered  7) medical hold  8) [facility] transfer center called for possible transfer to NSGY-trauma service for monitoring  9) Plastics for evaluation suturing laceration  10) C collar pending CT  11)  Silverio Arthur, physician, notified [phone]      ADDENDUM:  1) EKG NSR  2) ED physician will evaluate the wound for plastics at this time Rapid response called after patient found on floor. +head strike with obvious external bleeding left side. No periorbital edema or ecchymoses. Denies H/A, dizziness, CP, palpitations, new neck pain, radiating pains, numbness or new weakness. Able to answer simple yes/no questions at baseline level, consistent in responses to questions above. Per roommate, patient was trying to get out of wheelchair when fell      vitals: /84 HR 80 T 99  alert, aware of name, place, cannot relay what happened    On evaluation, patient noted to have hematoma left frontal region 2 x 1.5 cm with laceration, and larger laceration near lateral margin supraorbital area. EOMI.  moves BUE and LE ROm WFL, no pain with AROM and PROM, no swelling, or obvious dislocation.   bicep and shoulder elevation 4+-5-/5 bilaterally elbow flexion 5/5 bilateral grasp 5/5 wrist flexion and extension 5/5  ankle PF and DF 5/5 bilaterally  quad 4+/5  negative SLR  no sensory deficits LT    Patient moved back to bed with spinal precautions and     1) Head CT stat  2) Maxillofacial CT stat  3) C/T/L spine CT stat--> changed to Chest and A/P CT per rads department recommendation to evaluate for ribs, pelvis as well as spine  4) neurochecks q hour  5) 1:1 constant observation  6) CBC BMP Pt/INR EKG ordered  7) medical hold  8) [facility] transfer center called for possible transfer to NSGY-trauma service for monitoring  9) Plastics for evaluation suturing laceration  10) C collar pending CT  11) Lovenox HELD (already received AM dose)  12)  Silverio Arthur, physician, notified [phone]      ADDENDUM:  1) EKG NSR  2) ED physician will evaluate the wound for plastics at this time Rapid response called after patient found on floor. +head strike with obvious external bleeding left side. No periorbital edema or ecchymoses. Denies H/A, dizziness, CP, palpitations, new neck pain, radiating pains, numbness or new weakness. Able to answer simple yes/no questions at baseline level, consistent in responses to questions above. Per roommate, patient was trying to get out of wheelchair when fell      vitals: /84 HR 80 T 99  alert, aware of name, place, cannot relay what happened    On evaluation, patient noted to have hematoma left frontal region 2 x 1.5 cm with laceration, and larger laceration near lateral margin supraorbital area. EOMI.  moves BUE and LE ROm WFL, no pain with AROM and PROM, no swelling, or obvious dislocation.   bicep and shoulder elevation 4+-5-/5 bilaterally elbow flexion 5/5 bilateral grasp 5/5 wrist flexion and extension 5/5  ankle PF and DF 5/5 bilaterally  quad 4+/5  negative SLR  no sensory deficits LT    Patient moved back to bed with spinal precautions and     1) Head CT stat  2) Maxillofacial CT stat  3) C/T/L spine CT stat--> changed to Chest and A/P CT per rads department recommendation to evaluate for ribs, pelvis as well as spine  4) neurochecks q hour  5) 1:1 constant observation  6) CBC BMP Pt/INR EKG ordered  7) medical hold  8) [facility] transfer center called for possible transfer to NSGY-trauma service for monitoring  9) Plastics for evaluation suturing laceration  10) C collar pending CT  11) Lovenox HELD (already received AM dose)  12)  Silverio Arthur, physician, notified [phone]      ADDENDUM:  1) EKG NSR  2) ED physician called to evaluate the wound for plastics at this time--> defer as do not see patients on unit    ADDENDUM 5:01 PM:    EXAM:  CT MAXILLOFACIAL      PROCEDURE DATE:  05/17/2021        INTERPRETATION:  Maxillofacial CT without contrast  Comparison 04/19/21  History injury    There is a healing tiny nondisplaced fracture of the base of the left nasal bridge extending to the orbital rim. No new fractures are identified. There is resolution of left-sided periorbital soft tissue swelling. The deep orbital contents are within normal limits. There is clearing of the paranasal sinuses    IMPRESSION::  Improved without new abnormality      NILESH VILLALPANDO MD; Attending Radiologist  This document has been electronically signed. May 17 2021  3:39PM      EXAM:  CT BRAIN      PROCEDURE DATE:  05/17/2021        INTERPRETATION:  HISTORY: Injury    Comparison 04/20/21    Evaluation demonstrates no evidence of mass-effect or midline shift. No intraparenchymal mass lesions or hemorrhage is identified. Previous hemorrhage is resolved. There is no evidence of hydrocephalus. No extra-axial collections are noted.    The bone windows demonstrate no gross osseous abnormalities.    Impression:  1. Unremarkable noncontrast CT scan of the brain.        EXAM:  CT CERVICAL SPINE      PROCEDURE DATE:  05/17/2021        INTERPRETATION:  CT cervical spine without contrast    History injury    There is no fracture or acute subluxation or prevertebral soft tissue widening. There is severe multilevel degenerative disc and endplate change and to a lesser degree facet arthropathy without significant spinal stenosis.    IMPRESSION:  No acute findings      1) Awaiting official reads A/P and chest CT  2) Plastics will come for suturing this evening  3) Dr Wheatley reviewed images, no need for acute transfer at this time, continue monitoring. Repeat Head Ct if develops H/a and call NSGY if develops rhinorrhea due to old nasal bridge fx     Silverio Arthur, physician, notified [phone] of above results.

## 2021-05-18 ENCOUNTER — TRANSCRIPTION ENCOUNTER (OUTPATIENT)
Age: 66
End: 2021-05-18

## 2021-05-18 LAB
BASOPHILS # BLD AUTO: 0.02 K/UL — SIGNIFICANT CHANGE UP (ref 0–0.2)
BASOPHILS NFR BLD AUTO: 0.3 % — SIGNIFICANT CHANGE UP (ref 0–2)
EOSINOPHIL # BLD AUTO: 0.09 K/UL — SIGNIFICANT CHANGE UP (ref 0–0.5)
EOSINOPHIL NFR BLD AUTO: 1.1 % — SIGNIFICANT CHANGE UP (ref 0–6)
HCT VFR BLD CALC: 37.5 % — SIGNIFICANT CHANGE UP (ref 34.5–45)
HGB BLD-MCNC: 12 G/DL — SIGNIFICANT CHANGE UP (ref 11.5–15.5)
IMM GRANULOCYTES NFR BLD AUTO: 0.4 % — SIGNIFICANT CHANGE UP (ref 0–1.5)
LYMPHOCYTES # BLD AUTO: 1.45 K/UL — SIGNIFICANT CHANGE UP (ref 1–3.3)
LYMPHOCYTES # BLD AUTO: 18.3 % — SIGNIFICANT CHANGE UP (ref 13–44)
MCHC RBC-ENTMCNC: 30.1 PG — SIGNIFICANT CHANGE UP (ref 27–34)
MCHC RBC-ENTMCNC: 32 GM/DL — SIGNIFICANT CHANGE UP (ref 32–36)
MCV RBC AUTO: 94 FL — SIGNIFICANT CHANGE UP (ref 80–100)
MONOCYTES # BLD AUTO: 0.58 K/UL — SIGNIFICANT CHANGE UP (ref 0–0.9)
MONOCYTES NFR BLD AUTO: 7.3 % — SIGNIFICANT CHANGE UP (ref 2–14)
NEUTROPHILS # BLD AUTO: 5.77 K/UL — SIGNIFICANT CHANGE UP (ref 1.8–7.4)
NEUTROPHILS NFR BLD AUTO: 72.6 % — SIGNIFICANT CHANGE UP (ref 43–77)
NRBC # BLD: 0 /100 WBCS — SIGNIFICANT CHANGE UP (ref 0–0)
PLATELET # BLD AUTO: 180 K/UL — SIGNIFICANT CHANGE UP (ref 150–400)
RBC # BLD: 3.99 M/UL — SIGNIFICANT CHANGE UP (ref 3.8–5.2)
RBC # FLD: 14.6 % — HIGH (ref 10.3–14.5)
WBC # BLD: 7.94 K/UL — SIGNIFICANT CHANGE UP (ref 3.8–10.5)
WBC # FLD AUTO: 7.94 K/UL — SIGNIFICANT CHANGE UP (ref 3.8–10.5)

## 2021-05-18 PROCEDURE — 99233 SBSQ HOSP IP/OBS HIGH 50: CPT

## 2021-05-18 PROCEDURE — 70450 CT HEAD/BRAIN W/O DYE: CPT | Mod: 26

## 2021-05-18 RX ORDER — ENOXAPARIN SODIUM 100 MG/ML
40 INJECTION SUBCUTANEOUS EVERY 24 HOURS
Refills: 0 | Status: DISCONTINUED | OUTPATIENT
Start: 2021-05-19 | End: 2021-06-02

## 2021-05-18 RX ADMIN — OXYCODONE HYDROCHLORIDE 10 MILLIGRAM(S): 5 TABLET ORAL at 08:08

## 2021-05-18 RX ADMIN — LIDOCAINE 1 PATCH: 4 CREAM TOPICAL at 17:06

## 2021-05-18 RX ADMIN — TAMSULOSIN HYDROCHLORIDE 0.4 MILLIGRAM(S): 0.4 CAPSULE ORAL at 21:26

## 2021-05-18 RX ADMIN — Medication 250 MILLIGRAM(S): at 06:48

## 2021-05-18 RX ADMIN — LIDOCAINE 1 PATCH: 4 CREAM TOPICAL at 11:59

## 2021-05-18 RX ADMIN — MODAFINIL 100 MILLIGRAM(S): 200 TABLET ORAL at 06:48

## 2021-05-18 RX ADMIN — Medication 10 MILLIGRAM(S): at 17:07

## 2021-05-18 RX ADMIN — Medication 8 MILLIGRAM(S): at 21:26

## 2021-05-18 RX ADMIN — Medication 1 APPLICATION(S): at 13:44

## 2021-05-18 RX ADMIN — POLYETHYLENE GLYCOL 3350 17 GRAM(S): 17 POWDER, FOR SOLUTION ORAL at 12:00

## 2021-05-18 RX ADMIN — ZINC OXIDE 1 APPLICATION(S): 200 OINTMENT TOPICAL at 21:57

## 2021-05-18 RX ADMIN — Medication 100 MILLIGRAM(S): at 06:48

## 2021-05-18 RX ADMIN — SENNA PLUS 2 TABLET(S): 8.6 TABLET ORAL at 21:26

## 2021-05-18 RX ADMIN — ZINC OXIDE 1 APPLICATION(S): 200 OINTMENT TOPICAL at 06:51

## 2021-05-18 RX ADMIN — Medication 112 MICROGRAM(S): at 06:49

## 2021-05-18 RX ADMIN — Medication 1 TABLET(S): at 12:00

## 2021-05-18 RX ADMIN — NYSTATIN CREAM 1 APPLICATION(S): 100000 CREAM TOPICAL at 06:49

## 2021-05-18 RX ADMIN — Medication 10 MILLIGRAM(S): at 06:49

## 2021-05-18 RX ADMIN — ESCITALOPRAM OXALATE 10 MILLIGRAM(S): 10 TABLET, FILM COATED ORAL at 11:59

## 2021-05-18 RX ADMIN — NYSTATIN CREAM 1 APPLICATION(S): 100000 CREAM TOPICAL at 21:26

## 2021-05-18 RX ADMIN — Medication 1 MILLIGRAM(S): at 11:59

## 2021-05-18 RX ADMIN — Medication 250 MILLIGRAM(S): at 17:08

## 2021-05-18 NOTE — PROGRESS NOTE ADULT - SUBJECTIVE AND OBJECTIVE BOX
Patient is a 65y old  Female who presents with a chief complaint of SAH and fractures of left-sided ribs, left iliac wing with hematoma, S/P closed reduction of left pinky PIP dislocation after trauma (12 May 2021 16:05)      HPI:  65F PMHx breast cancer s/p mastectomy, HTN, hypothyroidism, not on full anticoagulation/antiplatelets per EMS, presented to [facility] 4/19/21as a level 2 trauma activation after a fall from a flight of stairs. Patient only able to say "ow" in trauma bay, unable to provide further history. However per son, normal mentation at baseline. In the trauma bay, airway was intact with bilateral breath sounds, O2 saturation 100% on room air. Initially blood pressure was 140s systolic however on repeat 90s. NS@100 initiated. Secondary significant for left forehead laceration, left chest wall tenderness, left pelvic tenderness.     Ms. Arthur was hypotensive in the trauma bay and was transferred to the Surgical ICU after imaging was obtained. Her injury list is below.  (1) SAH, bilateral: stable on repeat imaging. Seven day course of Keppra for post-traumatic seizure prophylaxis.  (2) left 3-8 and right 7th rib fractures with occult left pneumothorax: pain control  (3) left iliac wing fracture with hematoma: required transfusion early in her ICU course, after which CBC were stable  (4) left forehead laceration s/p suture repair  (5) s/p successful closed reduction of left pinky PIP dislocation with delfin splint  (6) left clavicle fracture  (7) left nasal bone and left orbital rim fractures  (8) sternal fracture  Incidental findings: right renal lesion. Pt's  was notified and copy of the report given to him.     The patient was admitted to Trauma Surgery under SICU care.  Ortho was consulted for L. iliac wing fx, sternal fx, L. clavicle fx- recommended WBAT of the affected left lower extremity with walker, WBAT L shoulder, pendulum exercises okay, no lifting, sling for comfort, Encourage ROM of elbow/wrist/hand, PT/OT consult, NO urgent orthopedic surgical intervention at this time.    Neurosurgery consulted- recommended repeat CTH, which was stable.     PRS was consulted for non displaced left superior orbital rim and non displaced left nasal bone fracture--> No indication for operative fixation of these non displaced fractures; recommend ice to face to aid in swelling and analgesia.    She was placed on hemorrhage watch in the SICU given pelvic fracture and surrounding hematoma.  Behavior health was consulted for delirium.    Hand was consulted for L. pinky PIP dislocation- s/p closed reduction, rec Cont delfin taping for now.    4/21- CTH stable  - Added home lexapro  - Added NS @ 50cc/hr to supplement poor PO intake  - Lactate cleared (1.9)    4/22- Transfused 1u pRBC, Hct 20.9 -> 26.9  - Went into SVT, given adenosine x1 and started on metoprolol 25mg q12hr  - Added oxycodone 5mg q4hrs prn for increased pain control  - PO intake improved, IVL    4/23- EP was consulted for SVT, recc continue lopressor 25mg   Metoprolol PO increased from 25mg q12hrs to 25mg q6hrs.   - Added salt tabs 1g q8hrs and started on NS @ 50cc/hr for hyponatremia  - Spangler discontinued, passed TOV  - Added lovenox  - Cervical collar cleared by confrontational exam     4/24- Patient transferred from SICU to surgical floor in stable condition.  Discharge planning to acute rehab.     Stable from neurologic perspective consistent with mild TBI  Multiple rib fractures - breathing comfortable with multimodal pain control    On day of discharge, the patient was tolerating diet, working with PT well and pain controlled. The patient was medically stable for discharge to acute rehab with instructions for outpatient follow up 4/27/21. 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    C Section    Abnormality, eye  h/o left eye vitrectomy    S/P D&amp;C (status post dilation and curettage)    H/O left mastectomy        MEDICATIONS  (STANDING):  BACItracin   Ointment 1 Application(s) Topical daily  bethanechol 10 milliGRAM(s) Oral two times a day  escitalopram 10 milliGRAM(s) Oral daily  folic acid 1 milliGRAM(s) Oral daily  levothyroxine 112 MICROGram(s) Oral daily  lidocaine   Patch 1 Patch Transdermal daily  metoprolol succinate  milliGRAM(s) Oral daily  modafinil 100 milliGRAM(s) Oral <User Schedule>  multivitamin 1 Tablet(s) Oral daily  nystatin Powder 1 Application(s) Topical two times a day  polyethylene glycol 3350 17 Gram(s) Oral daily  ramelteon 8 milliGRAM(s) Oral at bedtime  saccharomyces boulardii 250 milliGRAM(s) Oral two times a day  senna 2 Tablet(s) Oral at bedtime  tamsulosin 0.4 milliGRAM(s) Oral at bedtime  zinc oxide 40% Ointment 1 Application(s) Topical two times a day    MEDICATIONS  (PRN):  acetaminophen   Tablet .. 650 milliGRAM(s) Oral every 6 hours PRN Temp greater or equal to 38C (100.4F), Mild Pain (1 - 3)      Allergies    Tapazole (Hives)    Intolerances      Vital Signs Last 24 Hrs  T(C): 36.4 (18 May 2021 08:22), Max: 37.3 (17 May 2021 14:10)  T(F): 97.6 (18 May 2021 08:22), Max: 99.2 (17 May 2021 14:10)  HR: 72 (18 May 2021 08:22) (72 - 94)  BP: 116/77 (18 May 2021 08:22) (111/73 - 136/84)  BP(mean): --  RR: 15 (18 May 2021 08:22) (15 - 15)  SpO2: 97% (18 May 2021 08:22) (95% - 97%)        RECENT LABS:                          11.4   7.52  )-----------( 180      ( 17 May 2021 05:00 )             35.1     05-17    140  |  104  |  19  ----------------------------<  105<H>  4.7   |  30  |  0.68    Ca    8.7      17 May 2021 05:00    TPro  6.2  /  Alb  2.9<L>  /  TBili  0.4  /  DBili  x   /  AST  12  /  ALT  20  /  AlkPhos  321<H>  05-17              Review of Systems:   · Additional ROS	Patient awake, states she slept well. Endorsed pain in low back, stable from previous, and pain on left forehead over suture area    ,  this AM    Physical Exam:   · Constitutional	detailed exam  · Constitutional Details	no distress  · Constitutional Comments	alert, NAD O x 2 Restless, baseline arousal. inconsistent yes/no but improves with self-correction and repetition  ·	HEENT	sutures on left forehead and left supraorbital region  	  · Respiratory	detailed exam  · Respiratory Details	breath sounds equal; respirations non-labored; reduced BS bases  fair effort  · Cardiovascular	detailed exam  · Cardiovascular Details	regular rate and rhythm  · Gastrointestinal	detailed exam  · GI Normal	nontender; no distention; bowel sounds normal  	  · Extremities	 bialteral calves soft, NT no erythema or warmth  	no swelling left V finger or wrist/hand  	  · Skin	  +TTP along anterior and lateral ribs, mild  	               Patient is a 65y old  Female who presents with a chief complaint of SAH and fractures of left-sided ribs, left iliac wing with hematoma, S/P closed reduction of left pinky PIP dislocation after trauma (12 May 2021 16:05)      HPI:  65F PMHx breast cancer s/p mastectomy, HTN, hypothyroidism, not on full anticoagulation/antiplatelets per EMS, presented to [facility] 4/19/21as a level 2 trauma activation after a fall from a flight of stairs. Patient only able to say "ow" in trauma bay, unable to provide further history. However per son, normal mentation at baseline. In the trauma bay, airway was intact with bilateral breath sounds, O2 saturation 100% on room air. Initially blood pressure was 140s systolic however on repeat 90s. NS@100 initiated. Secondary significant for left forehead laceration, left chest wall tenderness, left pelvic tenderness.     Ms. Arthur was hypotensive in the trauma bay and was transferred to the Surgical ICU after imaging was obtained. Her injury list is below.  (1) SAH, bilateral: stable on repeat imaging. Seven day course of Keppra for post-traumatic seizure prophylaxis.  (2) left 3-8 and right 7th rib fractures with occult left pneumothorax: pain control  (3) left iliac wing fracture with hematoma: required transfusion early in her ICU course, after which CBC were stable  (4) left forehead laceration s/p suture repair  (5) s/p successful closed reduction of left pinky PIP dislocation with delfin splint  (6) left clavicle fracture  (7) left nasal bone and left orbital rim fractures  (8) sternal fracture  Incidental findings: right renal lesion. Pt's  was notified and copy of the report given to him.     The patient was admitted to Trauma Surgery under SICU care.  Ortho was consulted for L. iliac wing fx, sternal fx, L. clavicle fx- recommended WBAT of the affected left lower extremity with walker, WBAT L shoulder, pendulum exercises okay, no lifting, sling for comfort, Encourage ROM of elbow/wrist/hand, PT/OT consult, NO urgent orthopedic surgical intervention at this time.    Neurosurgery consulted- recommended repeat CTH, which was stable.     PRS was consulted for non displaced left superior orbital rim and non displaced left nasal bone fracture--> No indication for operative fixation of these non displaced fractures; recommend ice to face to aid in swelling and analgesia.    She was placed on hemorrhage watch in the SICU given pelvic fracture and surrounding hematoma.  Behavior health was consulted for delirium.    Hand was consulted for L. pinky PIP dislocation- s/p closed reduction, rec Cont delfin taping for now.    4/21- CTH stable  - Added home lexapro  - Added NS @ 50cc/hr to supplement poor PO intake  - Lactate cleared (1.9)    4/22- Transfused 1u pRBC, Hct 20.9 -> 26.9  - Went into SVT, given adenosine x1 and started on metoprolol 25mg q12hr  - Added oxycodone 5mg q4hrs prn for increased pain control  - PO intake improved, IVL    4/23- EP was consulted for SVT, recc continue lopressor 25mg   Metoprolol PO increased from 25mg q12hrs to 25mg q6hrs.   - Added salt tabs 1g q8hrs and started on NS @ 50cc/hr for hyponatremia  - Spangler discontinued, passed TOV  - Added lovenox  - Cervical collar cleared by confrontational exam     4/24- Patient transferred from SICU to surgical floor in stable condition.  Discharge planning to acute rehab.     Stable from neurologic perspective consistent with mild TBI  Multiple rib fractures - breathing comfortable with multimodal pain control    On day of discharge, the patient was tolerating diet, working with PT well and pain controlled. The patient was medically stable for discharge to acute rehab with instructions for outpatient follow up 4/27/21. 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    C Section    Abnormality, eye  h/o left eye vitrectomy    S/P D&amp;C (status post dilation and curettage)    H/O left mastectomy        MEDICATIONS  (STANDING):  BACItracin   Ointment 1 Application(s) Topical daily  bethanechol 10 milliGRAM(s) Oral two times a day  escitalopram 10 milliGRAM(s) Oral daily  folic acid 1 milliGRAM(s) Oral daily  levothyroxine 112 MICROGram(s) Oral daily  lidocaine   Patch 1 Patch Transdermal daily  metoprolol succinate  milliGRAM(s) Oral daily  modafinil 100 milliGRAM(s) Oral <User Schedule>  multivitamin 1 Tablet(s) Oral daily  nystatin Powder 1 Application(s) Topical two times a day  polyethylene glycol 3350 17 Gram(s) Oral daily  ramelteon 8 milliGRAM(s) Oral at bedtime  saccharomyces boulardii 250 milliGRAM(s) Oral two times a day  senna 2 Tablet(s) Oral at bedtime  tamsulosin 0.4 milliGRAM(s) Oral at bedtime  zinc oxide 40% Ointment 1 Application(s) Topical two times a day    MEDICATIONS  (PRN):  acetaminophen   Tablet .. 650 milliGRAM(s) Oral every 6 hours PRN Temp greater or equal to 38C (100.4F), Mild Pain (1 - 3)      Allergies    Tapazole (Hives)    Intolerances      Vital Signs Last 24 Hrs  T(C): 36.4 (18 May 2021 08:22), Max: 37.3 (17 May 2021 14:10)  T(F): 97.6 (18 May 2021 08:22), Max: 99.2 (17 May 2021 14:10)  HR: 72 (18 May 2021 08:22) (72 - 94)  BP: 116/77 (18 May 2021 08:22) (111/73 - 136/84)  BP(mean): --  RR: 15 (18 May 2021 08:22) (15 - 15)  SpO2: 97% (18 May 2021 08:22) (95% - 97%)        RECENT LABS:                          11.4   7.52  )-----------( 180      ( 17 May 2021 05:00 )             35.1     05-17    140  |  104  |  19  ----------------------------<  105<H>  4.7   |  30  |  0.68    Ca    8.7      17 May 2021 05:00    TPro  6.2  /  Alb  2.9<L>  /  TBili  0.4  /  DBili  x   /  AST  12  /  ALT  20  /  AlkPhos  321<H>  05-17              Review of Systems:   · Additional ROS	Patient awake, states she slept well. Endorsed pain in low back, stable from previous, and pain on left forehead over suture area    +left periorbital ecchymoses has developed since yesterday's evaluation. no eye pain or light sensitivity. No new numbness or radiating pains. Denies dysuria but continues to have significant urinary retention    ,  this AM    Physical Exam:   · Constitutional	detailed exam  · Constitutional Details	no distress  · Constitutional Comments	alert, sutatined eye opening. Yes/no reliability simple personal information fair+, overall improved  no new facial droop  +dressing over sutured lacerations intact  ·	HEENT	  EOMI,  	  · Respiratory	detailed exam  · Respiratory Details	breath sounds equal; respirations non-labored; reduced BS bases  fair effort  · Cardiovascular	detailed exam  · Cardiovascular Details	regular rate and rhythm  · Gastrointestinal	detailed exam  · GI Normal	nontender; no distention; bowel sounds normal  	  · Extremities	 bialteral calves soft, NT no erythema or warmth  	no swelling left V finger or wrist/hand  	  	bicep and shoulder elevation 4+-5-/5 bilaterally elbow flexion 5/5 bilateral grasp 5/5 wrist flexion and extension 5/5  ankle PF and DF 5/5 bilaterally  quad 4+/5  	no ankle, knee, wrist, elbow swelilng or TTP

## 2021-05-18 NOTE — PROGRESS NOTE ADULT - SUBJECTIVE AND OBJECTIVE BOX
Medicine Progress Note    Patient is a 65y old  Female who presents with a chief complaint of SAH and fractures of left-sided ribs, left iliac wing with hematoma, S/P closed reduction of left pinky PIP dislocation after trauma (17 May 2021 11:32)      SUBJECTIVE / OVERNIGHT EVENTS:  seen and examined after RRT  chart reviewed  no overnight events  patient reported headaches. no other symptoms reported. no new symptoms  more alert and awake today and answering more than 'yes' or 'no'     ADDITIONAL REVIEW OF SYSTEMS:  no fever/chills/CP/sob/palpitation/dizziness/ abd pain/nausea/vomiting/diarrhea/constipation/worsening limb weakness or numbness/vision problem/ dysuria. all other ROS neg    MEDICATIONS  (STANDING):  BACItracin   Ointment 1 Application(s) Topical daily  bethanechol 10 milliGRAM(s) Oral two times a day  escitalopram 10 milliGRAM(s) Oral daily  folic acid 1 milliGRAM(s) Oral daily  levothyroxine 112 MICROGram(s) Oral daily  lidocaine   Patch 1 Patch Transdermal daily  metoprolol succinate  milliGRAM(s) Oral daily  modafinil 100 milliGRAM(s) Oral <User Schedule>  multivitamin 1 Tablet(s) Oral daily  nystatin Powder 1 Application(s) Topical two times a day  polyethylene glycol 3350 17 Gram(s) Oral daily  ramelteon 8 milliGRAM(s) Oral at bedtime  saccharomyces boulardii 250 milliGRAM(s) Oral two times a day  senna 2 Tablet(s) Oral at bedtime  tamsulosin 0.4 milliGRAM(s) Oral at bedtime  zinc oxide 40% Ointment 1 Application(s) Topical two times a day    MEDICATIONS  (PRN):  acetaminophen   Tablet .. 650 milliGRAM(s) Oral every 6 hours PRN Temp greater or equal to 38C (100.4F), Mild Pain (1 - 3)    I&O's Summary    17 May 2021 07:01  -  18 May 2021 07:00  --------------------------------------------------------  IN: 0 mL / OUT: 1700 mL / NET: -1700 mL    18 May 2021 07:01  -  18 May 2021 12:30  --------------------------------------------------------  IN: 0 mL / OUT: 400 mL / NET: -400 mL      PHYSICAL EXAM:    Vital Signs Last 24 Hrs  T(C): 36.4 (18 May 2021 08:22), Max: 37.3 (17 May 2021 14:10)  T(F): 97.6 (18 May 2021 08:22), Max: 99.2 (17 May 2021 14:10)  HR: 72 (18 May 2021 08:22) (72 - 94)  BP: 116/77 (18 May 2021 08:22) (111/73 - 136/84)  BP(mean): --  RR: 15 (18 May 2021 08:22) (15 - 15)  SpO2: 97% (18 May 2021 08:22) (95% - 97%)    GENERAL: Not in distress. Alert    HEENT: dressing over forehead dry. left periorbital ecchymosis no globe TP.  clear conjuctiva, MMM.   no discharge from ears or nose.   CARDIOVASCULAR: RRR S1, S2. No murmur/rubs/gallop  LUNGS: BLAE+, no rales, no wheezing, no rhonchi.    ABDOMEN: ND. Soft,  NT, no guarding / rebound / rigidity. BS normoactive. No CVA tenderness.    BACK: No spine tenderness. no paravertebral musclespasm or TP  EXTREMITIES: no edema. no leg or calf TP. no injury noted. ROM NT  SKIN: laceration as above  NEUROLOGIC: Alert. awake. .strength is symmetric, sensation intact, speech fluent.    PSYCHIATRIC: Calm.  No agitation.    LABS:                        12.6   11.76 )-----------( 204      ( 17 May 2021 15:10 )             38.8     05-17    138  |  104  |  23  ----------------------------<  127<H>  4.0   |  25  |  0.89    Ca    9.0      17 May 2021 15:10    TPro  6.9  /  Alb  3.3  /  TBili  0.4  /  DBili  x   /  AST  17  /  ALT  23  /  AlkPhos  347<H>  05-17    PT/INR - ( 17 May 2021 15:10 )   PT: 12.9 sec;   INR: 1.07 ratio         PTT - ( 17 May 2021 15:10 )  PTT:27.9 sec          COVID-19 PCR: NotDetec (16 May 2021 05:00)  COVID-19 PCR: NotDetec (10 May 2021 05:00)  COVID-19 PCR: NotDetec (04 May 2021 05:00)  COVID-19 PCR: NotDetec (27 Apr 2021 18:10)  COVID-19 PCR: NotDetec (26 Apr 2021 06:33)  COVID-19 PCR: NotDetec (22 Apr 2021 11:24)  COVID-19 PCR: NotDetec (19 Apr 2021 11:09)      RADIOLOGY & ADDITIONAL TESTS:  Imaging from Last 24 Hours:    Electrocardiogram/QTc Interval:    COORDINATION OF CARE:  Care Discussed with Consultants/Other Providers:

## 2021-05-18 NOTE — PROGRESS NOTE ADULT - ASSESSMENT
66 yo woman with PMHx breast cancer s/p mastectomy, HTN, Hypothyroidism admitted to [facility] after hospital course at [facility] after a fall from a flight of stairs, sustaining SAH, left-sided rib fractures, left iliac wing fracture with hematoma, left forehead laceration, s/p successful closed reduction of left pinky PIP dislocation, Acute L3 compression fracture, 4/29, admitted for rehab- pt/ot/dvt ppx pain meds    s/p fall SAH left sided rib and left iliac wing fx, L3fx  fall 5/17 while trying to get out of wheelchair without help  Periorbital ecchymosis   - laceration over left frontal region, sutured by plastic 5/17. dressing dry  - cold compression to left eye  - 5/17: Maxilofacial CT: There is a healing tiny nondisplaced fracture of the base of the left nasal bridge extending to the orbital rim. No new fractures are identified. There is resolution of left-sided periorbital soft tissue swelling. The deep orbital contents are within normal limits. There is clearing of the paranasal sinuses  - 5/17: CT head: no hge, CT c-spine: no fracture  - CT chest. look for LS spine and rib fracture: no new fracture. trace pericardial effusion and new left infiltrate  - labs: hb stable. no acute changes  - patient does not have resp symptoms, no temp, wbc normal, would not treat left infiltrate  - primary team contacted [facility] for trauma monitoring. suggested no acute transfer needed. watch for CSF leak/worsening neurologic symptoms. call neurosx if any  - continue comprehensive rehabilitation program per PMR.   -  on 1:1  - fall precautions  - pain meds per PMR team    Urinary retention  Constipation  - straight cath/ PVR per protocol  - Urology consulted  - continue Flomax / urecholine  - continue caths as needed  - will need Spangler when discharged if retention persists  - Continue bowel regimen    Normocytic Anemia with folic acid deficiency - stable  - monitor H/H    Elevated ALKPHOS  - probably 2/2 multiple fractures   - avoid hepatotoxins  - trend LFTs  - encourage hydration    Episodes of SVT   - Continue with toprol XL   - Can follow up in 3 months with EP Dr. Hernández [phone]. (elective ablation)    Hypothyroidism  - Continue Synthroid     Acute adjustment disorder with depressed mood    -  continue modafinil for cognitive stimulation    Insomnia  - melatonin     Acute Hypoxic Respiratory Failure likely due to Acute B/L rib fractures, and small pneumothorax  - resolved  - Incentive spirometry.   - Supplemental Oxygen as needed for O2 goal > 92%    VTE PPX   - Lovenox    d/w Dr. Underwood

## 2021-05-18 NOTE — PROGRESS NOTE ADULT - SUBJECTIVE AND OBJECTIVE BOX
Rehab s/p CVA.    Fall from chair today - facial lac repaired by plastic surgery.    Retention / neurogenic bladder. On straight caths. On abx for UTI.      ROS  General: no fever or chills    VITAL SIGNS  Vital Signs Last 24 Hrs  T(C): 36.4 (18 May 2021 08:22), Max: 37.3 (17 May 2021 14:10)  T(F): 97.6 (18 May 2021 08:22), Max: 99.2 (17 May 2021 14:10)  HR: 72 (18 May 2021 08:22) (72 - 94)  BP: 116/77          LABS:                        12.0   7.94  )-----------( 180      ( 18 May 2021 05:00 )             37.5         138  |  104  |  23  ----------------------------<  127<H>  4.0   |  25  |  0.89    Ca    9.0      17 May 2021 15:10    TPro  6.9  /  Alb  3.3  /  TBili  0.4  /  DBili  x   /  AST  17  /  ALT  23  /  AlkPhos  347<H>      Urinalysis Basic - ( 17 May 2021 17:35 )    Color: Yellow / Appearance: Clear / S.015 / pH: x  Gluc: x / Ketone: Negative  / Bili: Negative / Urobili: Negative   Blood: x / Protein: Negative / Nitrite: Negative   Leuk Esterase: Negative / RBC: x / WBC x   Sq Epi: x / Non Sq Epi: x / Bacteria: x      Urine Culture: --  Escherichia coli        Prior notes/chart reviewed.

## 2021-05-18 NOTE — PROGRESS NOTE ADULT - ASSESSMENT
Retention / neurogenic bladder s/p CVA. On caths. On Flomax / urecholine. Abx for recent UTI.    - continue caths as needed  - continue abx and Flomax / urecholine  - avila when ready for discharge

## 2021-05-18 NOTE — PROGRESS NOTE ADULT - ASSESSMENT
JONATHANNOLVIA ARTHUR is a 65y with PMHx breast Ca S/P mastectomy, HTN, hypothyroidism who presented to [facility] 4/19/21 s/p fall down a flight of stairs with bilateral frontoparietal SAH; L nasal bone and L orbital rim fractures; sternal fracture; L 3-8, R 7th rib fractures;  L pneumothorax, L iliac wing fracture with hematoma S/P 1U pRBCs; left clavicle fx, left V finger PIP dislocation s/p CR. She is WBAT Left LE, NWB Left UE.     #SAH  - Avoid NSAIDs  - monitor neurological status, stable  - F/u neurosurgery, Dr. Vikas Aquino, upon discharge  - Precautions: falls, sternal , WB as above, AMS, cardiac, h/o breast CA    # Multiple fractures. WBAT LLE, NWB LUE   - Cont comprehensive rehab program, PT/OT/SLP 3 hours a day, 5 days a week.  - cleared for ROM exercises for L elbow, wrist, hand.  L shoulder pendulum exercises. Sling to LUE for comfort. No lifting more than 1-3 pounds for 6 weeks.  - LSO brace whenever OOB.  LSO replaced 5/14  -  buddy tape dc'd for L 5th finger dislocation (per Dr. Choe 5/10)  - Continue Lovenox for 6 weeks total (through 6/3)  - pain management as below  - follow up:  ·	plastic surgeon Dr. Nell Choe, for facial laceration, L pinky dislocation, L nasal bone and orbital rim fractures.   ·	ortho, Dr. Reji Cardoza, for L clavicle and L pelvic wing fractures  ·	surgery, Dr. Dino Peña, for rib fractures    #RRT S/P Fall 5/17/21  - patient found on floor. +head strike with obvious external bleeding left side.  - CTH, CT cervical, CT C/A/P no acute findings  - Spoke with Dr. Wheatley ([facility] [place]) via transfer center, imaging reviewed and no indication for acute transfer.   - Plastics consulted 5/17, and forehead laceration x2 sutured  - , Dr. Silverio Arthur, informed 5/17 of incident and all imaging findings  - CTH 5/18 unremarkable  - Lovenox HELD. Consider restarting 5/19.    # Acute Hypoxic Respiratory Failure likely due to Acute B/L rib fractures, and small pneumothorax  - Incentive spirometry. Breathing exercises  - Supplemental Oxygen PRN to keep O2 sats > 92%  - left 'breast pain' likely due to rib fx /possible atelectasis, +reproducible. continue breathing exercises, lidoderm patch. Stable 5/18 RA 95% - 97%    # Elevated Alk Phos likely 2/2 trauma, bone fractures  - Hepatic sono 5/7: Multiple gallstones, without evidence for gallbladder wall thickening or pericholecystic fluid.  - 5/7 GGT 37, alk phos 431 --> AST  12  /  ALT  20  /  AlkPhos  321<H>  05-17 improving  - CMP 5/20    # H/o SVT  - Continue with toprol XL   - Can follow up in 3 months with EP Dr. Hernández [phone]. (elective ablation)    #R kidney lesion  - F/u Dr. Herrera outpatient or PCP    #Hypothyroidism  - Continue Synthroid 112mcg daily    # Depression: stable/improved  - Psychiatry f/u 5/11 read and appreciated:   ·	Continue Lexapro 10mg  ·	Continue Modafinil 100mg  ·	Continue Ramelteon 8mg    # Pain  - Tylenol  - Lidoderm to L chest wall  - Scheduled Oxycodone 10mg at 8am before therapy to help improve pain control    # GI/Bowel:  - Senna QHS, Miralax Daily    # /Bladder:   - Avila placed 4/29/21 for urinary retention. TOV 5/6   -  consult 5/14 appreciated. May require avila upon discharge if retention persists.  ·	Continue Bethanechol 10 bid  ·	Continue Flomax 0.4 mg daily  ·	monitor voiding. straight caths as needed for high PVR  - Urine Cx: E. coli: started on Nitrofurantoin 100 q12 switched to CTX for 3 day course 5/15-5/18  ·	UA ordered 5/17 due to increased restlessness, patient unable to verbalize why: negative    # Diet  - regular solids thin liquids, 1:1 supervision for meals    # DVT ppx:  - Lovenox  - SCDs    # Case discussed in IDT 5/12/21:  - lives with son and spouse, has HHA for iADLs but able to do ADLs independently. Owns cane and RW.  - ambulates 40 feet with WBQC min-mod assist, 2 step with 1 HR and mod assist, supervision eating, min assist grooming, mod assist UB/LB dressing, mod assist toileting, max assist bathing  - Goal: supervision grooming, bathing LBD, Mark shower transfers, Mark transfers and ambulation  - multiple attempts by team to reach family to provide training unsuccessful. Given cognitive and physical deficits, uncertainty of availability support in community (requires round the clock at this time) will recommend JULI      #LABS  CBC BMP 5/20    ---------------  Outpatient Follow-up (Specialty/Name of physician):  Silvina Herrera  INTERNAL MEDICINE  [address] [place] [address]  Phone: [phone]  Fax: [phone]  Follow Up Time: 1 week    Vikas Aquino)  Neurosurgery  [address] [place] [address], [address] [place] [address]  Phone: [phone]  Fax: [phone]  Follow Up Time: 2 weeks    Nell Choe)  Plastic Surgery  [address] [facility], [address]  [place], [place] [address]  Phone: [phone]  Fax: [phone]  Follow Up Time: 1 week    Reji Cardoza)  Orthopaedic Surgery  [address] [facility], [facility] [address]  [place] [address]  Phone: [phone]  Fax: [phone]  Follow Up Time: Routine    Dino Peña)  Surgery; Surgical Critical Care  [address] [facility], [facility] [address], [place] [address]  Phone: [phone]  Fax: [phone]  Follow Up Time: Routine    Morgan Hernández; PhD)  Cardiac Electrophysiology; Cardiovascular Disease; Internal Medicine  [facility] - Dept of Cardiology, [address] [place] [address]  Phone: [phone]  Fax: [phone]   JONATHANNOLVIA ARTHUR is a 65y with PMHx breast Ca S/P mastectomy, HTN, hypothyroidism who presented to [facility] 4/19/21 s/p fall down a flight of stairs with bilateral frontoparietal SAH; L nasal bone and L orbital rim fractures; sternal fracture; L 3-8, R 7th rib fractures;  L pneumothorax, L iliac wing fracture with hematoma S/P 1U pRBCs; left clavicle fx, left V finger PIP dislocation s/p CR. She is WBAT Left LE, NWB Left UE.     #SAH  - Avoid NSAIDs  - monitor neurological status, stable  - F/u neurosurgery, Dr. Vikas Aquino, upon discharge  - Precautions: falls, sternal , WB as above, AMS, cardiac, h/o breast CA    # Multiple fractures. WBAT LLE, NWB LUE   - Cont comprehensive rehab program, PT/OT/SLP 3 hours a day, 5 days a week.  - cleared for ROM exercises for L elbow, wrist, hand.  L shoulder pendulum exercises. Sling to LUE for comfort. No lifting more than 1-3 pounds for 6 weeks.  - LSO brace whenever OOB.  LSO replaced 5/14  -  delfin tape dc'd for L 5th finger dislocation (per Dr. Choe 5/10)  - Continue Lovenox for 6 weeks total (through 6/3)  - pain management as below  - follow up:  ·	plastic surgeon Dr. Nell Choe, for facial laceration, L pinky dislocation, L nasal bone and orbital rim fractures.   ·	ortho, Dr. Reji Cardoza, for L clavicle and L pelvic wing fractures  ·	surgery, Dr. Dino Peña, for rib fractures    #RRT S/P Fall 5/17/21  - patient found on floor. +head strike with obvious external bleeding left side.  - CTH, CT cervical, CT C/A/P no acute findings  - [place] [place], imaging reviewed and no indication for acute transfer.   - Plastics consulted 5/17, and forehead laceration x2 sutured. Suture removal in 7-10 days.  - , Dr. Silverio Arthur, informed 5/17 of incident and all imaging findings  - CTH 5/18 unremarkable  - Lovenox HELD. Consider restarting 5/19.  - On 1:1 observation    # Acute Hypoxic Respiratory Failure likely due to Acute B/L rib fractures, and small pneumothorax  - Incentive spirometry. Breathing exercises  - Supplemental Oxygen PRN to keep O2 sats > 92%  - left 'breast pain' likely due to rib fx /possible atelectasis, +reproducible. continue breathing exercises, lidoderm patch. Stable 5/18 RA 95% - 97%    # Elevated Alk Phos likely 2/2 trauma, bone fractures  - Hepatic sono 5/7: Multiple gallstones, without evidence for gallbladder wall thickening or pericholecystic fluid.  - 5/7 GGT 37, alk phos 431 --> AST  12  /  ALT  20  /  AlkPhos  321<H>  05-17 improving  - CMP 5/20    # H/o SVT  - Continue with toprol XL   - Can follow up in 3 months with EP Dr. Hernández [phone]. (elective ablation)    #R kidney lesion  - F/u Dr. Herrera outpatient or PCP    #Hypothyroidism  - Continue Synthroid 112mcg daily    # Depression: stable/improved  - Psychiatry f/u 5/11 read and appreciated:   ·	Continue Lexapro 10mg  ·	Continue Modafinil 100mg  ·	Continue Ramelteon 8mg    # Pain  - Tylenol  - Lidoderm to L chest wall  - Scheduled Oxycodone 10mg at 8am before therapy to help improve pain control    # GI/Bowel:  - Senna QHS, Miralax Daily    # /Bladder:   - Avila placed 4/29/21 for urinary retention. TOV 5/6   -  consult 5/14 and 5/18 appreciated. May require avila upon discharge if retention persists.  ·	Continue Bethanechol 10 bid  ·	Continue Flomax 0.4 mg daily  ·	Avila ordered 5/18 due to continued straight caths.   - Urine Cx: E. coli: started on Nitrofurantoin 100 q12 switched to CTX for 3 day course 5/15-5/18  ·	UA ordered 5/17 due to increased restlessness, patient unable to verbalize why: negative    # Diet  - regular solids thin liquids, 1:1 supervision for meals    # DVT ppx:  - Lovenox  - SCDs    # Case discussed in IDT 5/12/21:  - lives with son and spouse, has HHA for iADLs but able to do ADLs independently. Owns cane and RW.  - ambulates 40 feet with WBQC min-mod assist, 2 step with 1 HR and mod assist, supervision eating, min assist grooming, mod assist UB/LB dressing, mod assist toileting, max assist bathing  - Goal: supervision grooming, bathing LBD, Mark shower transfers, Mark transfers and ambulation  - multiple attempts by team to reach family to provide training unsuccessful. Given cognitive and physical deficits, uncertainty of availability support in community (requires round the clock at this time) will recommend JULI      #LABS  CBC BMP 5/20    ---------------  Outpatient Follow-up (Specialty/Name of physician):  Silvina Herrera  INTERNAL MEDICINE  [address] [place] [address]  Phone: [phone]  Fax: [phone]  Follow Up Time: 1 week    Vikas Aquino)  Neurosurgery  [address] [place] [address], [address] [place] [address]  Phone: [phone]  Fax: [phone]  Follow Up Time: 2 weeks    Nell Choe)  Plastic Surgery  [address] [facility], [facility] [address]  [place], [place] [id number]  Phone: [phone]  Fax: [phone]  Follow Up Time: 1 week    Reji Cardoza)  Orthopaedic Surgery  [address] [facility], [facility] [address]  [place] [address]  Phone: [phone]  Fax: [phone]  Follow Up Time: Routine    Dino Peña)  Surgery; Surgical Critical Care  [address] [facility], [facility] [address], [place] [address]  Phone: [phone]  Fax: [phone]  Follow Up Time: Routine    Morgan Hernández; PhD)  Cardiac Electrophysiology; Cardiovascular Disease; Internal Medicine  [facility] - Dept of Cardiology, [address] [place] [address]  Phone: [phone]  Fax: [phone]   JONATHAN ARTHUR is a 65y with PMHx breast Ca S/P mastectomy, HTN, hypothyroidism who presented to [facility] 4/19/21 s/p fall down a flight of stairs with bilateral frontoparietal SAH; L nasal bone and L orbital rim fractures; sternal fracture; L 3-8, R 7th rib fractures;  L pneumothorax, L iliac wing fracture with hematoma S/P 1U pRBCs; left clavicle fx, left V finger PIP dislocation s/p CR. She is WBAT Left LE, NWB Left UE.     #SAH  - Avoid NSAIDs  - monitor neurological status, stable  - F/u neurosurgery, Dr. Vikas Aquino, upon discharge  - Precautions: falls, sternal , WB as above, AMS, cardiac, h/o breast CA    # Multiple fractures. WBAT LLE, NWB LUE   - Cont comprehensive rehab program, PT/OT/SLP 3 hours a day, 5 days a week.  - cleared for ROM exercises for L elbow, wrist, hand.  L shoulder pendulum exercises. Sling to LUE for comfort. No lifting more than 1-3 pounds for 6 weeks.  - LSO brace whenever OOB.  LSO replaced 5/14  -  delfin tape dc'd for L 5th finger dislocation (per Dr. Choe 5/10)  - Continue Lovenox for 6 weeks total (through 6/3). Was held yesterday s/p fall, Head CT negative for acute bleed x 2, Hgb stable will restart  - pain management as below  - follow up:  ·	plastic surgeon Dr. Nell Choe, for facial laceration, L pinky dislocation, L nasal bone and orbital rim fractures.   ·	ortho, Dr. Reji Cardoza, for L clavicle and L pelvic wing fractures  ·	surgery, Dr. Dino Peña, for rib fractures    #RRT S/P Fall 5/17/21 (please see event note)  - patient found on floor. +head strike with obvious external bleeding left side, lacerations left lateral brow and forehead  - Stat CTH, CT cervical, CT C/A/P no acute findings 5/17.  - Spoke with Dr. Wheatley ([facility] [place] center, imaging reviewed and no indication for acute transfer.   - Plastics consulted and appreciated 5/17, and forehead laceration x2 sutured. Suture removal in 7-10 days.  - , Dr. Silverio Arthur, informed 5/17 of incident and all imaging findings  - Repeat Head CT 5/18 unremarkable  - Lovenox HELD. Will restart 5/19 as repeat head CT stable and Hgb stable. CBC in AM 5/19  - 1:1 observation--> 2:1 enhanced supervision   - continue neuro checks, reduce from 1 hour to every 4 hours    # Acute Hypoxic Respiratory Failure likely due to Acute B/L rib fractures, and small pneumothorax  - Incentive spirometry. Breathing exercises  - Supplemental Oxygen PRN to keep O2 sats > 92%  - left 'breast pain' likely due to rib fx /possible atelectasis, +reproducible. continue breathing exercises, lidoderm patch.  - Stable 5/18 RA 95% - 97%    # Elevated Alk Phos likely 2/2 trauma, bone fractures  - Hepatic sono 5/7: Multiple gallstones, without evidence for gallbladder wall thickening or pericholecystic fluid.  - 5/7 GGT 37, alk phos 431 --> AST  12  /  ALT  20  /  AlkPhos  321<H>  05-17 improving  - CMP 5/20    # H/o SVT  - Continue with toprol XL   - Can follow up in 3 months with EP Dr. Hernández [phone]. (elective ablation)    #R kidney lesion  - F/u Dr. Herrera outpatient or PCP    #Hypothyroidism  - Continue Synthroid 112mcg daily    # Depression: stable/improved  - Psychiatry f/u 5/11 read and appreciated:   ·	Continue Lexapro 10mg  ·	Continue Modafinil 100mg  ·	Continue Ramelteon 8mg    # Pain  - Tylenol  - Lidoderm to L chest wall  - Scheduled Oxycodone 10mg at 8am before therapy to help improve pain control    # GI/Bowel:  - Senna QHS, Miralax Daily    # /Bladder:   - Avila placed 4/29/21 for urinary retention. TOV 5/6   - UA 5/17 negative  -  consult 5/18 appreciated  ·	Continue Bethanechol 10 bid  ·	Continue Flomax 0.4 mg daily  ·	Avila ordered 5/18 due to discomfort, continued straight caths, worsening PVR, restlessness leading to increased fall risk. Will likely need avila on dc per   - Urine Cx: E. coli: started on Nitrofurantoin, switched to CTX for 3 day course last day today 5/18    # Diet  - regular solids thin liquids, 1:1 supervision for meals    # DVT ppx:  - Lovenox  - SCDs    # Case discussed in IDT 5/12/21:  - lives with son and spouse, has HHA for iADLs but able to do ADLs independently. Owns cane and RW.  - ambulates 40 feet with WBQC min-mod assist, 2 step with 1 HR and mod assist, supervision eating, min assist grooming, mod assist UB/LB dressing, mod assist toileting, max assist bathing  - Goal: supervision grooming, bathing LBD, Mark shower transfers, Mark transfers and ambulation  - multiple attempts by team to reach family to provide training unsuccessful. Given cognitive and physical deficits, uncertainty of availability support in community (requires round the clock at this time) will recommend JULI      #LABS  avila catheter  CBC 5/19  CBC BMP 5/20    ---------------  Outpatient Follow-up (Specialty/Name of physician):  Silvina Herrera  INTERNAL MEDICINE  [address] [place] [address]  Phone: [phone]  Fax: [phone]  Follow Up Time: 1 week    Vikas Aquino)  Neurosurgery  [address] [facility], [address] [place] [address]  Phone: [phone]  Fax: [phone]  Follow Up Time: 2 weeks    Nell Choe)  Plastic Surgery  [address] [facility], [address]  [place], [place] [address]  Phone: [phone]  Fax: [phone]  Follow Up Time: 1 week    Reji Cardoza)  Orthopaedic Surgery  [address] [facility], [address]  [place] [address]  Phone: [phone]  Fax: [phone]  Follow Up Time: Routine    Dino Peña)  Surgery; Surgical Critical Care  [address] [facility], [address]  [place] [address]  Phone: [phone]  Fax: [phone]  Follow Up Time: Routine    Morgan Hernández; PhD)  Cardiac Electrophysiology; Cardiovascular Disease; Internal Medicine  [facility] - Dept of Cardiology, [address] [place] [address]  Phone: [phone]  Fax: [phone]

## 2021-05-19 LAB
BASOPHILS # BLD AUTO: 0.02 K/UL — SIGNIFICANT CHANGE UP (ref 0–0.2)
BASOPHILS NFR BLD AUTO: 0.3 % — SIGNIFICANT CHANGE UP (ref 0–2)
EOSINOPHIL # BLD AUTO: 0.07 K/UL — SIGNIFICANT CHANGE UP (ref 0–0.5)
EOSINOPHIL NFR BLD AUTO: 1 % — SIGNIFICANT CHANGE UP (ref 0–6)
HCT VFR BLD CALC: 38 % — SIGNIFICANT CHANGE UP (ref 34.5–45)
HGB BLD-MCNC: 12.4 G/DL — SIGNIFICANT CHANGE UP (ref 11.5–15.5)
IMM GRANULOCYTES NFR BLD AUTO: 0.4 % — SIGNIFICANT CHANGE UP (ref 0–1.5)
LYMPHOCYTES # BLD AUTO: 1.32 K/UL — SIGNIFICANT CHANGE UP (ref 1–3.3)
LYMPHOCYTES # BLD AUTO: 18.1 % — SIGNIFICANT CHANGE UP (ref 13–44)
MCHC RBC-ENTMCNC: 30.9 PG — SIGNIFICANT CHANGE UP (ref 27–34)
MCHC RBC-ENTMCNC: 32.6 GM/DL — SIGNIFICANT CHANGE UP (ref 32–36)
MCV RBC AUTO: 94.8 FL — SIGNIFICANT CHANGE UP (ref 80–100)
MONOCYTES # BLD AUTO: 0.49 K/UL — SIGNIFICANT CHANGE UP (ref 0–0.9)
MONOCYTES NFR BLD AUTO: 6.7 % — SIGNIFICANT CHANGE UP (ref 2–14)
NEUTROPHILS # BLD AUTO: 5.36 K/UL — SIGNIFICANT CHANGE UP (ref 1.8–7.4)
NEUTROPHILS NFR BLD AUTO: 73.5 % — SIGNIFICANT CHANGE UP (ref 43–77)
NRBC # BLD: 0 /100 WBCS — SIGNIFICANT CHANGE UP (ref 0–0)
PLATELET # BLD AUTO: 180 K/UL — SIGNIFICANT CHANGE UP (ref 150–400)
RBC # BLD: 4.01 M/UL — SIGNIFICANT CHANGE UP (ref 3.8–5.2)
RBC # FLD: 14.3 % — SIGNIFICANT CHANGE UP (ref 10.3–14.5)
WBC # BLD: 7.29 K/UL — SIGNIFICANT CHANGE UP (ref 3.8–10.5)
WBC # FLD AUTO: 7.29 K/UL — SIGNIFICANT CHANGE UP (ref 3.8–10.5)

## 2021-05-19 PROCEDURE — 99233 SBSQ HOSP IP/OBS HIGH 50: CPT

## 2021-05-19 PROCEDURE — 99232 SBSQ HOSP IP/OBS MODERATE 35: CPT

## 2021-05-19 RX ORDER — MODAFINIL 200 MG/1
100 TABLET ORAL
Refills: 0 | Status: DISCONTINUED | OUTPATIENT
Start: 2021-05-20 | End: 2021-05-24

## 2021-05-19 RX ADMIN — LIDOCAINE 1 PATCH: 4 CREAM TOPICAL at 11:45

## 2021-05-19 RX ADMIN — OXYCODONE HYDROCHLORIDE 10 MILLIGRAM(S): 5 TABLET ORAL at 08:30

## 2021-05-19 RX ADMIN — MODAFINIL 100 MILLIGRAM(S): 200 TABLET ORAL at 06:23

## 2021-05-19 RX ADMIN — SENNA PLUS 2 TABLET(S): 8.6 TABLET ORAL at 21:33

## 2021-05-19 RX ADMIN — ZINC OXIDE 1 APPLICATION(S): 200 OINTMENT TOPICAL at 20:25

## 2021-05-19 RX ADMIN — Medication 1 MILLIGRAM(S): at 11:45

## 2021-05-19 RX ADMIN — Medication 100 MILLIGRAM(S): at 05:13

## 2021-05-19 RX ADMIN — Medication 10 MILLIGRAM(S): at 16:50

## 2021-05-19 RX ADMIN — NYSTATIN CREAM 1 APPLICATION(S): 100000 CREAM TOPICAL at 05:16

## 2021-05-19 RX ADMIN — LIDOCAINE 1 PATCH: 4 CREAM TOPICAL at 16:50

## 2021-05-19 RX ADMIN — ZINC OXIDE 1 APPLICATION(S): 200 OINTMENT TOPICAL at 05:16

## 2021-05-19 RX ADMIN — Medication 1 APPLICATION(S): at 13:35

## 2021-05-19 RX ADMIN — ESCITALOPRAM OXALATE 10 MILLIGRAM(S): 10 TABLET, FILM COATED ORAL at 11:45

## 2021-05-19 RX ADMIN — Medication 250 MILLIGRAM(S): at 05:14

## 2021-05-19 RX ADMIN — LIDOCAINE 1 PATCH: 4 CREAM TOPICAL at 00:30

## 2021-05-19 RX ADMIN — Medication 112 MICROGRAM(S): at 05:13

## 2021-05-19 RX ADMIN — ENOXAPARIN SODIUM 40 MILLIGRAM(S): 100 INJECTION SUBCUTANEOUS at 07:10

## 2021-05-19 RX ADMIN — TAMSULOSIN HYDROCHLORIDE 0.4 MILLIGRAM(S): 0.4 CAPSULE ORAL at 21:33

## 2021-05-19 RX ADMIN — Medication 10 MILLIGRAM(S): at 05:14

## 2021-05-19 RX ADMIN — Medication 250 MILLIGRAM(S): at 20:25

## 2021-05-19 RX ADMIN — OXYCODONE HYDROCHLORIDE 10 MILLIGRAM(S): 5 TABLET ORAL at 07:50

## 2021-05-19 RX ADMIN — NYSTATIN CREAM 1 APPLICATION(S): 100000 CREAM TOPICAL at 20:24

## 2021-05-19 RX ADMIN — Medication 8 MILLIGRAM(S): at 21:33

## 2021-05-19 RX ADMIN — Medication 1 TABLET(S): at 11:44

## 2021-05-19 NOTE — PROGRESS NOTE ADULT - SUBJECTIVE AND OBJECTIVE BOX
(x  ) No complaints (  ) Complains of:   Pt seen during PT     T(F): 97.5 (05-19-21 @ 07:42), Max: 98 (05-19-21 @ 05:11)  HR: 62 (05-19-21 @ 07:42) (62 - 69)  BP: 101/68 (05-19-21 @ 07:42) (101/68 - 126/82)  RR: 15 (05-19-21 @ 07:42) (14 - 15)  SpO2: 96% (05-19-21 @ 07:42) (96% - 96%)        Wound Location 1:  sutures mid forehead and left eyebrow without suggest on hematoma                                12.4   7.29  )-----------( 180      ( 19 May 2021 06:46 )             38.0     CBC Full  -  ( 19 May 2021 06:46 )  WBC Count : 7.29 K/uL  RBC Count : 4.01 M/uL  Hemoglobin : 12.4 g/dL  Hematocrit : 38.0 %  Platelet Count - Automated : 180 K/uL  Mean Cell Volume : 94.8 fl  Mean Cell Hemoglobin : 30.9 pg  Mean Cell Hemoglobin Concentration : 32.6 gm/dL  Auto Neutrophil # : 5.36 K/uL  Auto Lymphocyte # : 1.32 K/uL  Auto Monocyte # : 0.49 K/uL  Auto Eosinophil # : 0.07 K/uL  Auto Basophil # : 0.02 K/uL  Auto Neutrophil % : 73.5 %  Auto Lymphocyte % : 18.1 %  Auto Monocyte % : 6.7 %  Auto Eosinophil % : 1.0 %  Auto Basophil % : 0.3 %                    Procedure Performed:  (  )Yes     ( x )No  Name of Procedure:  (  )debridement    (  )I&D    (  )Other:  (  )partial thickness     (  )full thickness     (  )subcutaneous     (  )muscle/tendon     (  )bone  (  )sharp     (  )surgical

## 2021-05-19 NOTE — PROGRESS NOTE ADULT - ASSESSMENT
64 yo woman with PMHx breast cancer s/p mastectomy, HTN, Hypothyroidism admitted to [facility] after hospital course at [facility] after a fall from a flight of stairs, sustaining SAH, left-sided rib fractures, left iliac wing fracture with hematoma, left forehead laceration, s/p successful closed reduction of left pinky PIP dislocation, Acute L3 compression fracture, 4/29, admitted for rehab- pt/ot/dvt ppx pain meds    fall at home  SAH   left sided rib and left iliac wing fx, L3fx  fall again at [facility]  rehab 5/17 while trying to get out of wheelchair without help  Periorbital ecchymosis   - laceration over left frontal region, sutured by plastic 5/17. dressing dry  - cold compression to left eye  - 5/17: Maxilofacial CT: There is a healing tiny nondisplaced fracture of the base of the left nasal bridge extending to the orbital rim. No new fractures are identified. There is resolution of left-sided periorbital soft tissue swelling. The deep orbital contents are within normal limits. There is clearing of the paranasal sinuses  - 5/17: CT head: no hge, CT c-spine: no fracture  - CT chest. look for LS spine and rib fracture: no new fracture. trace pericardial effusion and new left infiltrate  - labs: hb stable. no acute changes  - patient does not have resp symptoms, no temp, wbc normal, would not treat left infiltrate  - primary team contacted [facility] for trauma monitoring. suggested no acute transfer needed. watch for CSF leak/worsening neurologic symptoms. call neurosx if any  - continue comprehensive rehabilitation program per PMR.   -  on 1:1  - fall precautions  - pain meds per PMR team    Urinary retention  Constipation  - straight cath/ PVR per protocol  - Urology consulted  - continue Flomax / urecholine  - will need Spangler when discharged if retention persists  - Continue bowel regimen    Normocytic Anemia with folic acid deficiency - stable  - monitor H/H    Elevated ALKPHOS  - probably 2/2 multiple fractures   - avoid hepatotoxins  - trend LFTs  - encourage hydration    Episodes of SVT   - Continue with toprol XL   - Can follow up in 3 months with EP Dr. Hernández [phone]. (elective ablation)    Hypothyroidism  - Continue Synthroid     Acute adjustment disorder with depressed mood    -  continue modafinil for cognitive stimulation    Insomnia  - melatonin     Acute Hypoxic Respiratory Failure likely due to Acute B/L rib fractures, and small pneumothorax  - resolved  - Incentive spirometry.   - Supplemental Oxygen as needed for O2 goal > 92%    VTE PPX   - Lovenox    d/w Dr. Underwood

## 2021-05-19 NOTE — PROGRESS NOTE ADULT - SUBJECTIVE AND OBJECTIVE BOX
Medicine Progress Note    Patient is a 65y old  Female who presents with a chief complaint of SAH and fractures of left-sided ribs, left iliac wing with hematoma, S/P closed reduction of left pinky PIP dislocation after trauma (17 May 2021 11:32)      SUBJECTIVE / OVERNIGHT EVENTS:  no complaints  no overnight events    ADDITIONAL REVIEW OF SYSTEMS:  no fever/chills/CP/sob/palpitation/dizziness/ abd pain/nausea/vomiting/diarrhea/constipation/worsening limb weakness or numbness/vision problem/ dysuria. all other ROS neg    MEDICATIONS  (STANDING):  BACItracin   Ointment 1 Application(s) Topical daily  bethanechol 10 milliGRAM(s) Oral two times a day  enoxaparin Injectable 40 milliGRAM(s) SubCutaneous every 24 hours  escitalopram 10 milliGRAM(s) Oral daily  folic acid 1 milliGRAM(s) Oral daily  levothyroxine 112 MICROGram(s) Oral daily  lidocaine   Patch 1 Patch Transdermal daily  metoprolol succinate  milliGRAM(s) Oral daily  modafinil 100 milliGRAM(s) Oral <User Schedule>  multivitamin 1 Tablet(s) Oral daily  nystatin Powder 1 Application(s) Topical two times a day  oxyCODONE    IR 10 milliGRAM(s) Oral <User Schedule>  polyethylene glycol 3350 17 Gram(s) Oral daily  ramelteon 8 milliGRAM(s) Oral at bedtime  saccharomyces boulardii 250 milliGRAM(s) Oral two times a day  senna 2 Tablet(s) Oral at bedtime  tamsulosin 0.4 milliGRAM(s) Oral at bedtime  zinc oxide 40% Ointment 1 Application(s) Topical two times a day    MEDICATIONS  (PRN):  acetaminophen   Tablet .. 650 milliGRAM(s) Oral every 6 hours PRN Temp greater or equal to 38C (100.4F), Mild Pain (1 - 3)      PHYSICAL EXAM:    Vital Signs Last 24 Hrs  T(C): 36.4 (19 May 2021 07:42), Max: 36.7 (18 May 2021 19:46)  T(F): 97.5 (19 May 2021 07:42), Max: 98 (18 May 2021 19:46)  HR: 62 (19 May 2021 07:42) (62 - 80)  BP: 101/68 (19 May 2021 07:42) (101/68 - 126/82)  BP(mean): --  RR: 15 (19 May 2021 07:42) (14 - 15)  SpO2: 96% (19 May 2021 07:42) (94% - 96%)    I&O's Detail    18 May 2021 07:01  -  19 May 2021 07:00  --------------------------------------------------------  IN:  Total IN: 0 mL    OUT:    Indwelling Catheter - Urethral (mL): 1500 mL    Intermittent Catheterization - Urethral (mL): 300 mL    Voided (mL): 300 mL  Total OUT: 2100 mL    Total NET: -2100 mL      19 May 2021 07:01  -  19 May 2021 11:24  --------------------------------------------------------  IN:  Total IN: 0 mL    OUT:    Indwelling Catheter - Urethral (mL): 100 mL  Total OUT: 100 mL    Total NET: -100 mL      GENERAL: Not in distress. Alert    HEENT: dressing over forehead dry. left periorbital ecchymosis improving, no globe TP.  clear conjuctiva, MMM.   no discharge from ears or nose.   CARDIOVASCULAR: RRR S1, S2. No murmur/rubs/gallop  LUNGS: BLAE+, no rales, no wheezing, no rhonchi.    ABDOMEN: ND. Soft,  NT, no guarding / rebound / rigidity. BS normoactive. No CVA tenderness.    BACK: No spine tenderness. no paravertebral musclespasm or TP  EXTREMITIES: no edema. no leg or calf TP. no injury noted. ROM NT  SKIN: laceration as above  NEUROLOGIC: Alert. awake. .strength is symmetric, sensation intact, speech fluent.    PSYCHIATRIC: Calm.  No agitation.    LABS:                        12.6   11.76 )-----------( 204      ( 17 May 2021 15:10 )             38.8     05-17    138  |  104  |  23  ----------------------------<  127<H>  4.0   |  25  |  0.89    Ca    9.0      17 May 2021 15:10    TPro  6.9  /  Alb  3.3  /  TBili  0.4  /  DBili  x   /  AST  17  /  ALT  23  /  AlkPhos  347<H>  05-17    PT/INR - ( 17 May 2021 15:10 )   PT: 12.9 sec;   INR: 1.07 ratio         PTT - ( 17 May 2021 15:10 )  PTT:27.9 sec          COVID-19 PCR: NotDetec (16 May 2021 05:00)  COVID-19 PCR: NotDetec (10 May 2021 05:00)  COVID-19 PCR: NotDetec (04 May 2021 05:00)  COVID-19 PCR: NotDetec (27 Apr 2021 18:10)  COVID-19 PCR: NotDetec (26 Apr 2021 06:33)  COVID-19 PCR: NotDetec (22 Apr 2021 11:24)  COVID-19 PCR: NotDetec (19 Apr 2021 11:09)      RADIOLOGY & ADDITIONAL TESTS:  Imaging from Last 24 Hours:    Electrocardiogram/QTc Interval:    COORDINATION OF CARE:  Care Discussed with Consultants/Other Providers:

## 2021-05-19 NOTE — PROGRESS NOTE ADULT - ASSESSMENT
s/p repair of forehead lacs x 2   -continue local incision care with bacitracin  -suture removal 5-8 days.

## 2021-05-19 NOTE — PROGRESS NOTE ADULT - ASSESSMENT
JONATHAN CHILD is a 65y with PMHx breast Ca S/P mastectomy, HTN, hypothyroidism who presented to [facility] 4/19/21 s/p fall down a flight of stairs with bilateral frontoparietal SAH; L nasal bone and L orbital rim fractures; sternal fracture; L 3-8, R 7th rib fractures;  L pneumothorax, L iliac wing fracture with hematoma S/P 1U pRBCs; left clavicle fx, left V finger PIP dislocation s/p CR. She is WBAT Left LE, NWB Left UE.     # tSAH  - Avoid NSAIDs  - F/u neurosurgery, Dr. Vikas Aquino, upon discharge  - Cont comprehensive rehab program, PT/OT/SLP 3 hours a day, 5 days a week.  - Precautions: falls, sternal , WB as above, AMS, cardiac, h/o breast CA    #RRT S/P Fall 5/17/21 with head strike and lacerations left lateral brow and forehead  - Stat CTH, CT cervical, CT C/A/P no acute findings 5/17.  - Spoke with Dr. Wheatley ([facility] [place]) via transfer center, imaging reviewed and no indication for acute transfer.   - Plastics consulted and appreciated 5/17, and forehead laceration x2 sutured. Suture removal in 7-10 days.  - Repeat Head CT 12 hours post on 5/18 unremarkable  - Lovenox restarted 5/19 as repeat head CT stable and Hgb stable. Hgb 12.4, recheck in AM 5/20  - continue 2:1 enhanced supervision, reassessed 5/19  - continue neuro checks, reduce 4 hours--> 8 hours 5/19    # Multiple fractures. WBAT LLE, NWB LUE   - cleared for ROM exercises for L elbow, wrist, hand.  L shoulder pendulum exercises. Sling to LUE for comfort. No lifting more than 1-3 pounds for 6 weeks.  - LSO brace whenever OOB.  LSO replaced 5/14  -  delfin tape dc'd for L 5th finger dislocation (per Dr. Choe 5/10)  - Continue Lovenox for 6 weeks total (through 6/3)  - pain management as below  - follow up:  ·	plastic surgeon Dr. Nell Choe, for facial laceration, L pinky dislocation, L nasal bone and orbital rim fractures.   ·	ortho, Dr. Reji Cardoza, for L clavicle and L pelvic wing fractures  ·	surgery, Dr. Dino Peña, for rib fractures    # Acute Hypoxic Respiratory Failure likely due to Acute B/L rib fractures, and small pneumothorax  - Incentive spirometry. Breathing exercises  - Supplemental Oxygen PRN to keep O2 sats > 92%  - O2 sat 94-96 % 5/19    # Elevated Alk Phos likely 2/2 trauma, bone fractures  - Hepatic sono 5/7: Multiple gallstones, without evidence for gallbladder wall thickening or pericholecystic fluid.  - 5/7 GGT 37, alk phos 431 --> AST  12  /  ALT  20  /  AlkPhos  321<H>  05-17 improving  - CMP 5/20    # H/o SVT  - Continue with toprol XL   - Can follow up in 3 months with EP Dr. Hernández [phone]. (elective ablation)  - (62 - 80) HR 5/19    #R kidney lesion  - F/u Dr. Herrera outpatient or PCP    #Hypothyroidism  - Continue Synthroid 112mcg daily    # Depression: stable/improved  - Psychiatry f/u 5/11 read and appreciated:   ·	Continue Lexapro 10mg  ·	Continue Modafinil 100mg  ·	Continue Ramelteon 8mg    # Pain  - Tylenol  - Lidoderm to L chest wall  - Scheduled Oxycodone 10mg at 8am before therapy to help improve pain control    # GI/Bowel:  - Senna QHS, Miralax Daily    # /Bladder:   - Avila placed 4/29/21 for urinary retention. Failed TOV 5/6   - Urine Cx: E. coli: started on Nitrofurantoin, switched to CTX for 3 day course, finished 5/18  - UA 5/17 negative  -  consult 5/18 appreciated  ·	Continue Bethanechol 10 bid  ·	Continue Flomax 0.4 mg daily  ·	Avila ordered 5/18 due to discomfort, continued straight caths, worsening PVR, restlessness leading to increased fall risk. Will likely need avila on dc per . Renewed 5/19    # Diet  - regular solids thin liquids, 1:1 supervision for meals    # DVT ppx:  - Lovenox  - SCDs    # Case discussed in IDT 5/19/21:  - tolerating regular diet. +severe cognitive impairements, min assist transfers and ambualtion, negotiates 4 step with min assist, set up/supervision eating, min assist grooming, mod assist UB/LB dressing, mod assist shower, max toileting  - Goal: supervision grooming, bathing LBD, Mark shower transfers, Mark transfers and ambulation  - JULI next week if neurologically and medically stable 5/24      #LABS  avila catheter  2:1 observation  CBC BMP 5/20    ---------------  Outpatient Follow-up (Specialty/Name of physician):  Silvina Herrera  INTERNAL MEDICINE  [address] [place] [address]  Phone: [phone]  Fax: [phone]  Follow Up Time: 1 week    Vikas Aquino (MD)  Neurosurgery  [address] [facility], [address] [place] [address]  Phone: [phone]  Fax: [phone]  Follow Up Time: 2 weeks    Nell Choe (MD)  Plastic Surgery  [address] [facility], [address]  [place], [place] [id number]  Phone: [phone]  Fax: [phone]  Follow Up Time: 1 week    Reji Cardoza)  Orthopaedic Surgery  [address] [facility], [address]  [place] [address]  Phone: [phone]  Fax: [phone]  Follow Up Time: Routine    Dino Peña)  Surgery; Surgical Critical Care  [address] [facility], [address] [address]  Phone: [phone]  Fax: [phone]  Follow Up Time: Routine    Morgan Hernández; PhD)  Cardiac Electrophysiology; Cardiovascular Disease; Internal Medicine  [facility] - Dept of Cardiology, [address] [place] [address]  Phone: [phone]  Fax: [phone]

## 2021-05-19 NOTE — PROGRESS NOTE ADULT - SUBJECTIVE AND OBJECTIVE BOX
Patient is a 65y old  Female who presents with a chief complaint of SAH and fractures of left-sided ribs, left iliac wing with hematoma, S/P closed reduction of left pinky PIP dislocation after trauma (19 May 2021 11:22)      HPI:  65F PMHx breast cancer s/p mastectomy, HTN, hypothyroidism, not on full anticoagulation/antiplatelets per EMS, presented to [facility] 21as a level 2 trauma activation after a fall from a flight of stairs. Patient only able to say "ow" in trauma bay, unable to provide further history. However per son, normal mentation at baseline. In the trauma bay, airway was intact with bilateral breath sounds, O2 saturation 100% on room air. Initially blood pressure was 140s systolic however on repeat 90s. NS@100 initiated. Secondary significant for left forehead laceration, left chest wall tenderness, left pelvic tenderness.     Ms. Arthur was hypotensive in the trauma bay and was transferred to the Surgical ICU after imaging was obtained. Her injury list is below.  (1) SAH, bilateral: stable on repeat imaging. Seven day course of Keppra for post-traumatic seizure prophylaxis.  (2) left 3-8 and right 7th rib fractures with occult left pneumothorax: pain control  (3) left iliac wing fracture with hematoma: required transfusion early in her ICU course, after which CBC were stable  (4) left forehead laceration s/p suture repair  (5) s/p successful closed reduction of left pinky PIP dislocation with delfin splint  (6) left clavicle fracture  (7) left nasal bone and left orbital rim fractures  (8) sternal fracture  Incidental findings: right renal lesion. Pt's  was notified and copy of the report given to him.     The patient was admitted to Trauma Surgery under SICU care.  Ortho was consulted for L. iliac wing fx, sternal fx, L. clavicle fx- recommended WBAT of the affected left lower extremity with walker, WBAT L shoulder, pendulum exercises okay, no lifting, sling for comfort, Encourage ROM of elbow/wrist/hand, PT/OT consult, NO urgent orthopedic surgical intervention at this time.    Neurosurgery consulted- recommended repeat CTH, which was stable.     PRS was consulted for non displaced left superior orbital rim and non displaced left nasal bone fracture--> No indication for operative fixation of these non displaced fractures; recommend ice to face to aid in swelling and analgesia.    She was placed on hemorrhage watch in the SICU given pelvic fracture and surrounding hematoma.  Behavior health was consulted for delirium.    Hand was consulted for L. pinky PIP dislocation- s/p closed reduction, rec Cont delfin taping for now.    - CTH stable  - Added home lexapro  - Added NS @ 50cc/hr to supplement poor PO intake  - Lactate cleared (1.9)    - Transfused 1u pRBC, Hct 20.9 -> 26.9  - Went into SVT, given adenosine x1 and started on metoprolol 25mg q12hr  - Added oxycodone 5mg q4hrs prn for increased pain control  - PO intake improved, IVL    - EP was consulted for SVT, recc continue lopressor 25mg   Metoprolol PO increased from 25mg q12hrs to 25mg q6hrs.   - Added salt tabs 1g q8hrs and started on NS @ 50cc/hr for hyponatremia  - Spangler discontinued, passed TOV  - Added lovenox  - Cervical collar cleared by confrontational exam     - Patient transferred from SICU to surgical floor in stable condition.  Discharge planning to acute rehab.     Stable from neurologic perspective consistent with mild TBI  Multiple rib fractures - breathing comfortable with multimodal pain control    On day of discharge, the patient was tolerating diet, working with PT well and pain controlled. The patient was medically stable for discharge to acute rehab with instructions for outpatient follow up 21. 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    C Section    Abnormality, eye  h/o left eye vitrectomy    S/P D&amp;C (status post dilation and curettage)    H/O left mastectomy        MEDICATIONS  (STANDING):  BACItracin   Ointment 1 Application(s) Topical daily  bethanechol 10 milliGRAM(s) Oral two times a day  enoxaparin Injectable 40 milliGRAM(s) SubCutaneous every 24 hours  escitalopram 10 milliGRAM(s) Oral daily  folic acid 1 milliGRAM(s) Oral daily  levothyroxine 112 MICROGram(s) Oral daily  lidocaine   Patch 1 Patch Transdermal daily  metoprolol succinate  milliGRAM(s) Oral daily  modafinil 100 milliGRAM(s) Oral <User Schedule>  multivitamin 1 Tablet(s) Oral daily  nystatin Powder 1 Application(s) Topical two times a day  oxyCODONE    IR 10 milliGRAM(s) Oral <User Schedule>  polyethylene glycol 3350 17 Gram(s) Oral daily  ramelteon 8 milliGRAM(s) Oral at bedtime  saccharomyces boulardii 250 milliGRAM(s) Oral two times a day  senna 2 Tablet(s) Oral at bedtime  tamsulosin 0.4 milliGRAM(s) Oral at bedtime  zinc oxide 40% Ointment 1 Application(s) Topical two times a day    MEDICATIONS  (PRN):  acetaminophen   Tablet .. 650 milliGRAM(s) Oral every 6 hours PRN Temp greater or equal to 38C (100.4F), Mild Pain (1 - 3)      Allergies    Tapazole (Hives)    Intolerances          VITALS  65y  Vital Signs Last 24 Hrs  T(C): 36.4 (19 May 2021 07:42), Max: 36.7 (18 May 2021 19:46)  T(F): 97.5 (19 May 2021 07:42), Max: 98 (18 May 2021 19:46)  HR: 62 (19 May 2021 07:42) (62 - 80)  BP: 101/68 (19 May 2021 07:42) (101/68 - 126/82)  BP(mean): --  RR: 15 (19 May 2021 07:42) (14 - 15)  SpO2: 96% (19 May 2021 07:42) (94% - 96%)  Daily     Daily         RECENT LABS:                          12.4   7.29  )-----------( 180      ( 19 May 2021 06:46 )             38.0     05-    138  |  104  |  23  ----------------------------<  127<H>  4.0   |  25  |  0.89    Ca    9.0      17 May 2021 15:10    TPro  6.9  /  Alb  3.3  /  TBili  0.4  /  DBili  x   /  AST  17  /  ALT  23  /  AlkPhos  347<H>  05-17    LIVER FUNCTIONS - ( 17 May 2021 15:10 )  Alb: 3.3 g/dL / Pro: 6.9 g/dL / ALK PHOS: 347 U/L / ALT: 23 U/L / AST: 17 U/L / GGT: x           PT/INR - ( 17 May 2021 15:10 )   PT: 12.9 sec;   INR: 1.07 ratio         PTT - ( 17 May 2021 15:10 )  PTT:27.9 sec  Urinalysis Basic - ( 17 May 2021 17:35 )    Color: Yellow / Appearance: Clear / S.015 / pH: x  Gluc: x / Ketone: Negative  / Bili: Negative / Urobili: Negative   Blood: x / Protein: Negative / Nitrite: Negative   Leuk Esterase: Negative / RBC: x / WBC x   Sq Epi: x / Non Sq Epi: x / Bacteria: x          CAPILLARY BLOOD GLUCOSE        Review of Systems:   · Additional ROS	Patient looks much improved. Sitting, eating breakfast with assistance, on 2:1 observation. Spangler in place. Denies pelvic discomfort, no abdominal discomfort. Reports facial pain from lacerations improved. no H/A or new neck pain +LSO in place    Physical Exam:   · Constitutional	detailed exam  · Constitutional Details	no distress  · Constitutional Comments	alert, sustained eye opening. GCS=14 ([id number])  periorbital ecchymoses fading from left eye, trace right lid. Dressing over laceration dry, clean, seen by plastics today    ·	HEENT	  EOMI, conjunctiva clear  	  · Respiratory	detailed exam  · Respiratory Details	breath sounds equal; respirations non-labored; reduced BS bases  f  · Cardiovascular	detailed exam  · Cardiovascular Details	regular rate and rhythm  · Gastrointestinal	detailed exam  · GI Normal	nontender; no distention; bowel sounds normal  	  · Extremities	 bialteral calves soft, NT no erythema or warmth

## 2021-05-20 LAB
ALBUMIN SERPL ELPH-MCNC: 3 G/DL — LOW (ref 3.3–5)
ALP SERPL-CCNC: 271 U/L — HIGH (ref 40–120)
ALT FLD-CCNC: 22 U/L — SIGNIFICANT CHANGE UP (ref 10–45)
ANION GAP SERPL CALC-SCNC: 10 MMOL/L — SIGNIFICANT CHANGE UP (ref 5–17)
AST SERPL-CCNC: 13 U/L — SIGNIFICANT CHANGE UP (ref 10–40)
BASOPHILS # BLD AUTO: 0.04 K/UL — SIGNIFICANT CHANGE UP (ref 0–0.2)
BASOPHILS NFR BLD AUTO: 0.5 % — SIGNIFICANT CHANGE UP (ref 0–2)
BILIRUB SERPL-MCNC: 0.4 MG/DL — SIGNIFICANT CHANGE UP (ref 0.2–1.2)
BUN SERPL-MCNC: 23 MG/DL — SIGNIFICANT CHANGE UP (ref 7–23)
CALCIUM SERPL-MCNC: 8.7 MG/DL — SIGNIFICANT CHANGE UP (ref 8.4–10.5)
CHLORIDE SERPL-SCNC: 107 MMOL/L — SIGNIFICANT CHANGE UP (ref 96–108)
CO2 SERPL-SCNC: 25 MMOL/L — SIGNIFICANT CHANGE UP (ref 22–31)
CREAT SERPL-MCNC: 0.56 MG/DL — SIGNIFICANT CHANGE UP (ref 0.5–1.3)
EOSINOPHIL # BLD AUTO: 0.15 K/UL — SIGNIFICANT CHANGE UP (ref 0–0.5)
EOSINOPHIL NFR BLD AUTO: 2 % — SIGNIFICANT CHANGE UP (ref 0–6)
GLUCOSE SERPL-MCNC: 122 MG/DL — HIGH (ref 70–99)
HCT VFR BLD CALC: 36 % — SIGNIFICANT CHANGE UP (ref 34.5–45)
HGB BLD-MCNC: 11.6 G/DL — SIGNIFICANT CHANGE UP (ref 11.5–15.5)
IMM GRANULOCYTES NFR BLD AUTO: 0.4 % — SIGNIFICANT CHANGE UP (ref 0–1.5)
LYMPHOCYTES # BLD AUTO: 1.59 K/UL — SIGNIFICANT CHANGE UP (ref 1–3.3)
LYMPHOCYTES # BLD AUTO: 21.4 % — SIGNIFICANT CHANGE UP (ref 13–44)
MCHC RBC-ENTMCNC: 30.2 PG — SIGNIFICANT CHANGE UP (ref 27–34)
MCHC RBC-ENTMCNC: 32.2 GM/DL — SIGNIFICANT CHANGE UP (ref 32–36)
MCV RBC AUTO: 93.8 FL — SIGNIFICANT CHANGE UP (ref 80–100)
MONOCYTES # BLD AUTO: 0.58 K/UL — SIGNIFICANT CHANGE UP (ref 0–0.9)
MONOCYTES NFR BLD AUTO: 7.8 % — SIGNIFICANT CHANGE UP (ref 2–14)
NEUTROPHILS # BLD AUTO: 5.04 K/UL — SIGNIFICANT CHANGE UP (ref 1.8–7.4)
NEUTROPHILS NFR BLD AUTO: 67.9 % — SIGNIFICANT CHANGE UP (ref 43–77)
NRBC # BLD: 0 /100 WBCS — SIGNIFICANT CHANGE UP (ref 0–0)
PLATELET # BLD AUTO: 179 K/UL — SIGNIFICANT CHANGE UP (ref 150–400)
POTASSIUM SERPL-MCNC: 4.2 MMOL/L — SIGNIFICANT CHANGE UP (ref 3.5–5.3)
POTASSIUM SERPL-SCNC: 4.2 MMOL/L — SIGNIFICANT CHANGE UP (ref 3.5–5.3)
PROT SERPL-MCNC: 6.3 G/DL — SIGNIFICANT CHANGE UP (ref 6–8.3)
RBC # BLD: 3.84 M/UL — SIGNIFICANT CHANGE UP (ref 3.8–5.2)
RBC # FLD: 14.2 % — SIGNIFICANT CHANGE UP (ref 10.3–14.5)
SODIUM SERPL-SCNC: 142 MMOL/L — SIGNIFICANT CHANGE UP (ref 135–145)
WBC # BLD: 7.43 K/UL — SIGNIFICANT CHANGE UP (ref 3.8–10.5)
WBC # FLD AUTO: 7.43 K/UL — SIGNIFICANT CHANGE UP (ref 3.8–10.5)

## 2021-05-20 PROCEDURE — 99232 SBSQ HOSP IP/OBS MODERATE 35: CPT

## 2021-05-20 RX ADMIN — OXYCODONE HYDROCHLORIDE 10 MILLIGRAM(S): 5 TABLET ORAL at 08:17

## 2021-05-20 RX ADMIN — Medication 8 MILLIGRAM(S): at 21:43

## 2021-05-20 RX ADMIN — TAMSULOSIN HYDROCHLORIDE 0.4 MILLIGRAM(S): 0.4 CAPSULE ORAL at 21:43

## 2021-05-20 RX ADMIN — MODAFINIL 100 MILLIGRAM(S): 200 TABLET ORAL at 06:23

## 2021-05-20 RX ADMIN — LIDOCAINE 1 PATCH: 4 CREAM TOPICAL at 11:26

## 2021-05-20 RX ADMIN — LIDOCAINE 1 PATCH: 4 CREAM TOPICAL at 23:11

## 2021-05-20 RX ADMIN — Medication 10 MILLIGRAM(S): at 17:47

## 2021-05-20 RX ADMIN — Medication 250 MILLIGRAM(S): at 17:46

## 2021-05-20 RX ADMIN — OXYCODONE HYDROCHLORIDE 10 MILLIGRAM(S): 5 TABLET ORAL at 08:16

## 2021-05-20 RX ADMIN — SENNA PLUS 2 TABLET(S): 8.6 TABLET ORAL at 21:43

## 2021-05-20 RX ADMIN — ZINC OXIDE 1 APPLICATION(S): 200 OINTMENT TOPICAL at 05:13

## 2021-05-20 RX ADMIN — NYSTATIN CREAM 1 APPLICATION(S): 100000 CREAM TOPICAL at 17:47

## 2021-05-20 RX ADMIN — POLYETHYLENE GLYCOL 3350 17 GRAM(S): 17 POWDER, FOR SOLUTION ORAL at 11:26

## 2021-05-20 RX ADMIN — Medication 1 APPLICATION(S): at 11:26

## 2021-05-20 RX ADMIN — ESCITALOPRAM OXALATE 10 MILLIGRAM(S): 10 TABLET, FILM COATED ORAL at 11:26

## 2021-05-20 RX ADMIN — Medication 10 MILLIGRAM(S): at 05:13

## 2021-05-20 RX ADMIN — Medication 1 TABLET(S): at 11:26

## 2021-05-20 RX ADMIN — Medication 112 MICROGRAM(S): at 05:13

## 2021-05-20 RX ADMIN — ENOXAPARIN SODIUM 40 MILLIGRAM(S): 100 INJECTION SUBCUTANEOUS at 06:23

## 2021-05-20 RX ADMIN — ZINC OXIDE 1 APPLICATION(S): 200 OINTMENT TOPICAL at 17:47

## 2021-05-20 RX ADMIN — LIDOCAINE 1 PATCH: 4 CREAM TOPICAL at 19:50

## 2021-05-20 RX ADMIN — Medication 1 MILLIGRAM(S): at 11:26

## 2021-05-20 RX ADMIN — NYSTATIN CREAM 1 APPLICATION(S): 100000 CREAM TOPICAL at 05:13

## 2021-05-20 RX ADMIN — Medication 250 MILLIGRAM(S): at 05:13

## 2021-05-20 RX ADMIN — LIDOCAINE 1 PATCH: 4 CREAM TOPICAL at 00:30

## 2021-05-20 NOTE — PROGRESS NOTE ADULT - SUBJECTIVE AND OBJECTIVE BOX
Patient is a 65y old  Female who presents with a chief complaint of SAH and fractures of left-sided ribs, left iliac wing with hematoma, S/P closed reduction of left pinky PIP dislocation after trauma (20 May 2021 10:49)    No events overnight  Denies chest pain, SOB  Patient seen and examined at bedside.    ALLERGIES:  Tapazole (Hives)    MEDICATIONS  (STANDING):  BACItracin   Ointment 1 Application(s) Topical daily  bethanechol 10 milliGRAM(s) Oral two times a day  enoxaparin Injectable 40 milliGRAM(s) SubCutaneous every 24 hours  escitalopram 10 milliGRAM(s) Oral daily  folic acid 1 milliGRAM(s) Oral daily  levothyroxine 112 MICROGram(s) Oral daily  lidocaine   Patch 1 Patch Transdermal daily  metoprolol succinate  milliGRAM(s) Oral daily  modafinil 100 milliGRAM(s) Oral <User Schedule>  multivitamin 1 Tablet(s) Oral daily  nystatin Powder 1 Application(s) Topical two times a day  oxyCODONE    IR 10 milliGRAM(s) Oral <User Schedule>  polyethylene glycol 3350 17 Gram(s) Oral daily  ramelteon 8 milliGRAM(s) Oral at bedtime  saccharomyces boulardii 250 milliGRAM(s) Oral two times a day  senna 2 Tablet(s) Oral at bedtime  tamsulosin 0.4 milliGRAM(s) Oral at bedtime  zinc oxide 40% Ointment 1 Application(s) Topical two times a day    MEDICATIONS  (PRN):  acetaminophen   Tablet .. 650 milliGRAM(s) Oral every 6 hours PRN Temp greater or equal to 38C (100.4F), Mild Pain (1 - 3)    Vital Signs Last 24 Hrs  T(F): 98.2 (20 May 2021 09:14), Max: 98.4 (19 May 2021 20:21)  HR: 98 (20 May 2021 09:14) (69 - 98)  BP: 124/83 (20 May 2021 09:14) (104/66 - 124/83)  RR: 14 (20 May 2021 09:14) (14 - 14)  SpO2: 92% (20 May 2021 09:14) (92% - 97%)  I&O's Summary    19 May 2021 07:01  -  20 May 2021 07:00  --------------------------------------------------------  IN: 0 mL / OUT: 1150 mL / NET: -1150 mL      GENERAL: Not in distress. Alert    HEENT: dressing over forehead dry. left periorbital ecchymosis improving, no globe TP.  clear conjuctiva, MMM.   no discharge from ears or nose.   CARDIOVASCULAR: RRR S1, S2. No murmur/rubs/gallop  LUNGS: BLAE+, no rales, no wheezing, no rhonchi.    ABDOMEN: ND. Soft,  NT, no guarding / rebound / rigidity. BS normoactive. No CVA tenderness.    BACK: No spine tenderness. no paravertebral musclespasm or TP  EXTREMITIES: no edema. no leg or calf TP. no injury noted. ROM NT  SKIN: laceration as above  NEUROLOGIC: Alert. awake. .strength is symmetric, sensation intact, speech fluent.    PSYCHIATRIC: Calm.  No agitation.    LABS:                        11.6   7.43  )-----------( 179      ( 20 May 2021 05:00 )             36.0           142  |  107  |  23  ----------------------------<  122  4.2   |  25  |  0.56    Ca    8.7      20 May 2021 05:00    TPro  6.3  /  Alb  3.0  /  TBili  0.4  /  DBili  x   /  AST  13  /  ALT  22  /  AlkPhos  271       eGFR if Non African American: 96 mL/min/1.73M2 (21 @ 05:00)  eGFR if : 112 mL/min/1.73M2 (21 @ 05:00)    PT/INR - ( 17 May 2021 15:10 )   PT: 12.9 sec;   INR: 1.07 ratio         PTT - ( 17 May 2021 15:10 )  PTT:27.9 sec     Urinalysis Basic - ( 17 May 2021 17:35 )    Color: Yellow / Appearance: Clear / S.015 / pH: x  Gluc: x / Ketone: Negative  / Bili: Negative / Urobili: Negative   Blood: x / Protein: Negative / Nitrite: Negative   Leuk Esterase: Negative / RBC: x / WBC x   Sq Epi: x / Non Sq Epi: x / Bacteria: x    COVID-19 PCR: NotDetec (21 @ 05:00)  COVID-19 PCR: NotDetec (05-10-21 @ 05:00)  COVID-19 PCR: NotDetec (21 @ 05:00)  COVID-19 PCR: NotDetec (21 @ 18:10)  COVID-19 PCR: NotDetec (21 @ 06:33)    COVID-19 PCR: NotDetec (21 @ 05:00)  COVID-19 PCR: NotDetec (05-10-21 @ 05:00)  COVID-19 PCR: NotDetec (21 @ 05:00)  COVID-19 PCR: NotDetec (21 @ 18:10)  COVID-19 PCR: NotDetec (21 @ 06:33)  COVID-19 PCR: NotDetec (21 @ 11:24)  COVID-19 PCR: NotDetec (21 @ 11:09)        RADIOLOGY & ADDITIONAL TESTS:  Care Discussed with Consultants/Other Providers:

## 2021-05-20 NOTE — PROGRESS NOTE ADULT - ASSESSMENT
JONATHAN CHILD is a 65y with PMHx breast Ca S/P mastectomy, HTN, hypothyroidism who presented to [facility] 4/19/21 s/p fall down a flight of stairs with bilateral frontoparietal SAH; L nasal bone and L orbital rim fractures; sternal fracture; L 3-8, R 7th rib fractures;  L pneumothorax, L iliac wing fracture with hematoma S/P 1U pRBCs; left clavicle fx, left V finger PIP dislocation s/p CR. She is WBAT Left LE, NWB Left UE.     # tSAH  - Avoid NSAIDs  - F/u neurosurgery, Dr. Vikas Aquino, upon discharge  - Cont comprehensive rehab program, PT/OT/SLP 3 hours a day, 5 days a week.  - Precautions: falls, sternal , WB as above, AMS, cardiac, h/o breast CA    #RRT S/P Fall 5/17/21 with head strike and lacerations left lateral brow and forehead  - Stat CTH, CT cervical, CT C/A/P no acute findings 5/17.  - Spoke with Dr. Wheatley ([facility] [place] via transfer center, imaging reviewed and no indication for acute transfer.   - Plastics consulted and appreciated 5/17, and forehead laceration x2 sutured. Local incision care with bacitracin. Suture removal in 5-8 days.  - Repeat Head CT 12 hours post on 5/18 unremarkable  - Lovenox restarted 5/19 as repeat head CT stable and Hgb stable. Hgb 12.4 --> 11.6 5/20  - continue 2:1 enhanced supervision, reassessed 5/20  - continue neuro checks, reduce 4 hours--> 8 hours 5/19    # Multiple fractures. WBAT LLE, NWB LUE   - cleared for ROM exercises for L elbow, wrist, hand.  L shoulder pendulum exercises. Sling to LUE for comfort. No lifting more than 1-3 pounds for 6 weeks.  - LSO brace whenever OOB.  LSO replaced 5/14  -  delfin tape dc'd for L 5th finger dislocation (per Dr. Choe 5/10)  - Continue Lovenox for 6 weeks total (through 6/3)  - pain management as below  - follow up:  ·	plastic surgeon Dr. Nell Choe, for facial laceration, L pinky dislocation, L nasal bone and orbital rim fractures.   ·	ortho, Dr. Reji Cardoza, for L clavicle and L pelvic wing fractures  ·	surgery, Dr. Dino Peña, for rib fractures    # Acute Hypoxic Respiratory Failure likely due to Acute B/L rib fractures, and small pneumothorax  - Incentive spirometry. Breathing exercises  - Supplemental Oxygen PRN to keep O2 sats > 92%  - O2 sat 92% - 97% 5/20    # Elevated Alk Phos likely 2/2 trauma, bone fractures  - Hepatic sono 5/7: Multiple gallstones, without evidence for gallbladder wall thickening or pericholecystic fluid.  - 5/7 GGT 37, AST  13  /  ALT  22  /  AlkPhos  271<H>  05-20 improving  - CMP 5/24    # H/o SVT  - Continue with toprol XL   - Can follow up in 3 months with EP Dr. Hernández [phone]. (elective ablation)  - (62 - 80) HR 5/19    #R kidney lesion  - F/u Dr. Herrera outpatient or PCP    #Hypothyroidism  - Continue Synthroid 112mcg daily    # Depression: stable/improved  - Psychiatry f/u 5/11 read and appreciated:   ·	Continue Lexapro 10mg  ·	Continue Modafinil 100mg  ·	Continue Ramelteon 8mg. May need to switch sleep regimen if patient continues to report poor sleep.    # Pain  - Tylenol  - Lidoderm to L chest wall  - Scheduled Oxycodone 10mg at 8am before therapy to help improve pain control    # GI/Bowel:  - Senna QHS, Miralax Daily    # /Bladder:   - Avila placed 4/29/21 for urinary retention. Failed TOV 5/6   - Urine Cx: E. coli: started on Nitrofurantoin, switched to CTX for 3 day course, finished 5/18  - UA 5/17 negative  -  consult 5/18 appreciated  ·	Continue Bethanechol 10 bid  ·	Continue Flomax 0.4 mg daily  ·	Avila ordered 5/18 due to discomfort, continued straight caths, worsening PVR, restlessness leading to increased fall risk. Will likely need avila on dc per . Renewed 5/20    # Diet  - regular solids thin liquids, 1:1 supervision for meals    # DVT ppx:  - Lovenox  - SCDs    # Case discussed in IDT 5/19/21:  - tolerating regular diet. +severe cognitive impairements, min assist transfers and ambualtion, negotiates 4 step with min assist, set up/supervision eating, min assist grooming, mod assist UB/LB dressing, mod assist shower, max toileting  - Goal: supervision grooming, bathing LBD, Mark shower transfers, Mark transfers and ambulation  - JULI next week if neurologically and medically stable 5/24      #LABS  avila catheter  2:1 observation  CBC BMP 5/24    ---------------  Outpatient Follow-up (Specialty/Name of physician):  Silvina Herrera  INTERNAL MEDICINE  [address] [place] [address]  Phone: [phone]  Fax: [phone]  Follow Up Time: 1 week    Vikas Aquino)  Neurosurgery  [address] [place] [address]  Phone: [phone]  Fax: [phone]  Follow Up Time: 2 weeks    Nell Choe)  Plastic Surgery  [address] [facility] [address]  [place], [place] [id number]  Phone: [phone]  Fax: [phone]  Follow Up Time: 1 week    Reji Cardoza)  Orthopaedic Surgery  [address] [facility] [address]  [place] [address]  Phone: [phone]  Fax: [phone]  Follow Up Time: Routine    Dino Peña)  Surgery; Surgical Critical Care  [address] [facility] [address]  Phone: [phone]  Fax: [phone]  Follow Up Time: Routine    Morgan Hernández; PhD)  Cardiac Electrophysiology; Cardiovascular Disease; Internal Medicine  [facility] - Dept of Cardiology, [address] [place] [address]  Phone: [phone]  Fax: [phone]   JONATHAN CHILD is a 65y with PMHx breast Ca S/P mastectomy, HTN, hypothyroidism who presented to [facility] 4/19/21 s/p fall down a flight of stairs with bilateral frontoparietal SAH; L nasal bone and L orbital rim fractures; sternal fracture; L 3-8, R 7th rib fractures;  L pneumothorax, L iliac wing fracture with hematoma S/P 1U pRBCs; left clavicle fx, left V finger PIP dislocation s/p CR. She is WBAT Left LE, NWB Left UE.     # tSAH  - Avoid NSAIDs  - F/u neurosurgery, Dr. Vikas Aquino, upon discharge  - Cont comprehensive rehab program, PT/OT/SLP 3 hours a day, 5 days a week.  - Precautions: falls, sternal , WB as above, AMS, cardiac, h/o breast CA    #RRT S/P Fall 5/17/21 with head strike and lacerations left lateral brow and forehead  - Stat CTH, CT cervical, CT C/A/P no acute findings 5/17.  - [facility], imaging reviewed and no indication for acute transfer.   - Plastics consulted and appreciated 5/17, and forehead laceration x2 sutured. Local incision care with bacitracin. Suture removal in 5-8 days.  - Repeat Head CT 12 hours post on 5/18 unremarkable  - Lovenox restarted 5/19 as repeat head CT stable. Hgb 12.4 --> 11.6 5/20. Will repeat in AM 5/21  - continue neuro checks q 8 hours   - continue 2:1 enhanced supervision, reassessed 5/20    # Multiple fractures. WBAT LLE, NWB LUE   - cleared for ROM exercises for L elbow, wrist, hand.  L shoulder pendulum exercises. Sling to LUE for comfort. No lifting more than 1-3 pounds for 6 weeks.  - LSO brace whenever OOB.  LSO replaced 5/14  -  delfin tape dc'd for L 5th finger dislocation (per Dr. Choe 5/10)  - Continue Lovenox for 6 weeks total (through 6/3)  - pain management as below  - follow up:  ·	plastic surgeon Dr. Nell Choe, for facial laceration, L pinky dislocation, L nasal bone and orbital rim fractures.   ·	ortho, Dr. Reji Cardoza, for L clavicle and L pelvic wing fractures  ·	surgery, Dr. Dino Peña, for rib fractures    # Acute Hypoxic Respiratory Failure likely due to Acute B/L rib fractures, and small pneumothorax  - Incentive spirometry. Breathing exercises  - Supplemental Oxygen PRN to keep O2 sats > 92%  - O2 sat 92% - 97% 5/20    # Elevated Alk Phos likely 2/2 trauma, bone fractures  - Hepatic sono 5/7: Multiple gallstones, without evidence for gallbladder wall thickening or pericholecystic fluid.  - 5/7 GGT 37, AST  13  /  ALT  22  /  AlkPhos  271<H>  05-20 improving  - CMP 5/24    # H/o SVT  - Continue with toprol XL   - Can follow up in 3 months with EP Dr. Hernández [phone]. (elective ablation)    #R kidney lesion  - F/u Dr. Herrera outpatient or PCP    #Hypothyroidism  - Continue Synthroid 112mcg daily    # Depression: stable/improved  - Psychiatry f/u 5/11 read and appreciated:   ·	Continue Lexapro 10mg  ·	Continue Modafinil 100mg  ·	Continue Ramelteon 8mg. May need to switch sleep regimen if patient continues to report poor sleep.    # Pain  - Tylenol  - Lidoderm to L chest wall  - Scheduled Oxycodone 10mg at 8am before therapy to help improve pain control    # GI/Bowel:  - Senna QHS, Miralax Daily    # /Bladder:   - Avila placed 4/29/21 for urinary retention. Failed TOV 5/6   - Urine Cx: E. coli: started on Nitrofurantoin, switched to CTX for 3 day course, finished 5/18  - UA 5/17 negative  -  consult 5/18 appreciated  ·	Continue Bethanechol 10 bid  ·	Continue Flomax 0.4 mg daily  ·	Avila ordered 5/18 due to discomfort, continued straight caths, worsening PVR, restlessness leading to increased fall risk. Will likely need avila on dc per . Renewed 5/20    # Diet  - regular solids thin liquids  - 1:1 supervision for meals    # DVT ppx:  - Lovenox  - SCDs    # Case discussed in IDT 5/19/21:  - tolerating regular diet. +severe cognitive impairments, min assist transfers and ambulation, negotiates 4 step with min assist, set up/supervision eating, min assist grooming, mod assist UB/LB dressing, mod assist shower, max toileting  - Goal: supervision grooming, bathing LBD, Mark shower transfers, Mark transfers and ambulation  - JULI next week if neurologically and medically stable 5/24      #LABS  CBC 5/21  avila catheter  2:1 observation  CBC BMP 5/24    ---------------  Outpatient Follow-up (Specialty/Name of physician):  Silvina Herrera  INTERNAL MEDICINE  [address] [place] [address]  Phone: [phone]  Fax: [phone]  Follow Up Time: 1 week    Vikas Aquino (MD)  Neurosurgery  [address] [place] [address], [address] [place] [address]  Phone: [phone]  Fax: [phone]  Follow Up Time: 2 weeks    Nell Choe)  Plastic Surgery  [address] [facility], [address]  [place], [place] [id number]  Phone: [phone]  Fax: [phone]  Follow Up Time: 1 week    Reji Cardoza)  Orthopaedic Surgery  [address] [facility] [facility] [address]  [place] [address]  Phone: [phone]  Fax: [phone]  Follow Up Time: Routine    Dino Peña)  Surgery; Surgical Critical Care  [address] [facility]  [place] [address]  Phone: [phone]  Fax: [phone]  Follow Up Time: Routine    Morgan Hernández; PhD)  Cardiac Electrophysiology; Cardiovascular Disease; Internal Medicine  [facility] - Dept of Cardiology, [address] [place] [address]  Phone: [phone]  Fax: [phone]

## 2021-05-20 NOTE — PROGRESS NOTE ADULT - ASSESSMENT
66 yo woman with PMHx breast cancer s/p mastectomy, HTN, Hypothyroidism admitted to [facility] after hospital course at [facility] after a fall from a flight of stairs, sustaining SAH, left-sided rib fractures, left iliac wing fracture with hematoma, left forehead laceration, s/p successful closed reduction of left pinky PIP dislocation, Acute L3 compression fracture, 4/29, admitted for rehab- pt/ot/dvt ppx pain meds    fall at home  SAH   left sided rib and left iliac wing fx, L3fx  fall again at [facility]  rehab 5/17 while trying to get out of wheelchair without help  Periorbital ecchymosis   - laceration over left frontal region, sutured by plastic 5/17. dressing dry  - cold compression to left eye  - 5/17: Maxilofacial CT: There is a healing tiny nondisplaced fracture of the base of the left nasal bridge extending to the orbital rim. No new fractures are identified. There is resolution of left-sided periorbital soft tissue swelling. The deep orbital contents are within normal limits. There is clearing of the paranasal sinuses  - 5/17: CT head: no hge, CT c-spine: no fracture  - CT chest. look for LS spine and rib fracture: no new fracture. trace pericardial effusion and new left infiltrate  - labs: hb stable. no acute changes  - patient does not have resp symptoms, no temp, wbc normal, would not treat left infiltrate  - primary team contacted [facility] for trauma monitoring. suggested no acute transfer needed. watch for CSF leak/worsening neurologic symptoms. call neurosx if any  - continue comprehensive rehabilitation program per PMR.   -  on 1:1  - fall precautions  - pain meds per PMR team    Urinary retention  Constipation  - straight cath/ PVR per protocol  - Urology consulted  - continue Flomax / urecholine  - will need Spangler when discharged if retention persists  - Continue bowel regimen    Normocytic Anemia with folic acid deficiency - stable  - monitor H/H    Elevated ALKPHOS  - probably 2/2 multiple fractures   - avoid hepatotoxins  - trend LFTs  - encourage hydration    Episodes of SVT   - Continue with toprol XL  100mg daily  - Can follow up in 3 months with EP Dr. Hernández [phone]. (elective ablation)    Hypothyroidism  - Continue Synthroid 112mcg daily    Acute adjustment disorder with depressed mood    -  continue modafinil for cognitive stimulation    Insomnia  - melatonin     Acute Hypoxic Respiratory Failure likely due to Acute B/L rib fractures, and small pneumothorax  - resolved  - Incentive spirometry.   - Supplemental Oxygen as needed for O2 goal > 92%    VTE PPX   - Lovenox    d/w rehab team during IDR

## 2021-05-20 NOTE — PROGRESS NOTE ADULT - SUBJECTIVE AND OBJECTIVE BOX
Patient is a 65y old  Female who presents with a chief complaint of SAH and fractures of left-sided ribs, left iliac wing with hematoma, S/P closed reduction of left pinky PIP dislocation after trauma (19 May 2021 11:22)      HPI:  65F PMHx breast cancer s/p mastectomy, HTN, hypothyroidism, not on full anticoagulation/antiplatelets per EMS, presented to [facility] 4/19/21as a level 2 trauma activation after a fall from a flight of stairs. Patient only able to say "ow" in trauma bay, unable to provide further history. However per son, normal mentation at baseline. In the trauma bay, airway was intact with bilateral breath sounds, O2 saturation 100% on room air. Initially blood pressure was 140s systolic however on repeat 90s. NS@100 initiated. Secondary significant for left forehead laceration, left chest wall tenderness, left pelvic tenderness.     Ms. Arthur was hypotensive in the trauma bay and was transferred to the Surgical ICU after imaging was obtained. Her injury list is below.  (1) SAH, bilateral: stable on repeat imaging. Seven day course of Keppra for post-traumatic seizure prophylaxis.  (2) left 3-8 and right 7th rib fractures with occult left pneumothorax: pain control  (3) left iliac wing fracture with hematoma: required transfusion early in her ICU course, after which CBC were stable  (4) left forehead laceration s/p suture repair  (5) s/p successful closed reduction of left pinky PIP dislocation with delfin splint  (6) left clavicle fracture  (7) left nasal bone and left orbital rim fractures  (8) sternal fracture  Incidental findings: right renal lesion. Pt's  was notified and copy of the report given to him.     The patient was admitted to Trauma Surgery under SICU care.  Ortho was consulted for L. iliac wing fx, sternal fx, L. clavicle fx- recommended WBAT of the affected left lower extremity with walker, WBAT L shoulder, pendulum exercises okay, no lifting, sling for comfort, Encourage ROM of elbow/wrist/hand, PT/OT consult, NO urgent orthopedic surgical intervention at this time.    Neurosurgery consulted- recommended repeat CTH, which was stable.     PRS was consulted for non displaced left superior orbital rim and non displaced left nasal bone fracture--> No indication for operative fixation of these non displaced fractures; recommend ice to face to aid in swelling and analgesia.    She was placed on hemorrhage watch in the SICU given pelvic fracture and surrounding hematoma.  Behavior health was consulted for delirium.    Hand was consulted for L. pinky PIP dislocation- s/p closed reduction, rec Cont delfin taping for now.    4/21- CTH stable  - Added home lexapro  - Added NS @ 50cc/hr to supplement poor PO intake  - Lactate cleared (1.9)    4/22- Transfused 1u pRBC, Hct 20.9 -> 26.9  - Went into SVT, given adenosine x1 and started on metoprolol 25mg q12hr  - Added oxycodone 5mg q4hrs prn for increased pain control  - PO intake improved, IVL    4/23- EP was consulted for SVT, recc continue lopressor 25mg   Metoprolol PO increased from 25mg q12hrs to 25mg q6hrs.   - Added salt tabs 1g q8hrs and started on NS @ 50cc/hr for hyponatremia  - Spangler discontinued, passed TOV  - Added lovenox  - Cervical collar cleared by confrontational exam     4/24- Patient transferred from SICU to surgical floor in stable condition.  Discharge planning to acute rehab.     Stable from neurologic perspective consistent with mild TBI  Multiple rib fractures - breathing comfortable with multimodal pain control    On day of discharge, the patient was tolerating diet, working with PT well and pain controlled. The patient was medically stable for discharge to acute rehab with instructions for outpatient follow up 4/27/21. 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    C Section    Abnormality, eye  h/o left eye vitrectomy    S/P D&amp;C (status post dilation and curettage)    H/O left mastectomy    Allergies    Tapazole (Hives)    Intolerances    MEDICATIONS  (STANDING):  BACItracin   Ointment 1 Application(s) Topical daily  bethanechol 10 milliGRAM(s) Oral two times a day  enoxaparin Injectable 40 milliGRAM(s) SubCutaneous every 24 hours  escitalopram 10 milliGRAM(s) Oral daily  folic acid 1 milliGRAM(s) Oral daily  levothyroxine 112 MICROGram(s) Oral daily  lidocaine   Patch 1 Patch Transdermal daily  metoprolol succinate  milliGRAM(s) Oral daily  modafinil 100 milliGRAM(s) Oral <User Schedule>  multivitamin 1 Tablet(s) Oral daily  nystatin Powder 1 Application(s) Topical two times a day  oxyCODONE    IR 10 milliGRAM(s) Oral <User Schedule>  polyethylene glycol 3350 17 Gram(s) Oral daily  ramelteon 8 milliGRAM(s) Oral at bedtime  saccharomyces boulardii 250 milliGRAM(s) Oral two times a day  senna 2 Tablet(s) Oral at bedtime  tamsulosin 0.4 milliGRAM(s) Oral at bedtime  zinc oxide 40% Ointment 1 Application(s) Topical two times a day    MEDICATIONS  (PRN):  acetaminophen   Tablet .. 650 milliGRAM(s) Oral every 6 hours PRN Temp greater or equal to 38C (100.4F), Mild Pain (1 - 3)        Vital Signs Last 24 Hrs  T(C): 36.8 (20 May 2021 09:14), Max: 36.9 (19 May 2021 20:21)  T(F): 98.2 (20 May 2021 09:14), Max: 98.4 (19 May 2021 20:21)  HR: 98 (20 May 2021 09:14) (69 - 98)  BP: 124/83 (20 May 2021 09:14) (104/66 - 124/83)  BP(mean): --  RR: 14 (20 May 2021 09:14) (14 - 14)  SpO2: 92% (20 May 2021 09:14) (92% - 97%)        RECENT LABS:               11.6   7.43  )-----------( 179      ( 20 May 2021 05:00 )             36.0     05-20    142  |  107  |  23  ----------------------------<  122<H>  4.2   |  25  |  0.56    Ca    8.7      20 May 2021 05:00    TPro  6.3  /  Alb  3.0<L>  /  TBili  0.4  /  DBili  x   /  AST  13  /  ALT  22  /  AlkPhos  271<H>  05-20        Review of Systems:   · Additional ROS	Patient seen and assessed. She denied pain in neck, face, head. Patient endorsed poor sleep last night. Answering yes/no questions    Physical Exam:   · Constitutional	detailed exam  · Constitutional Details	no distress  · Constitutional Comments	alert, sustained eye opening. GCS=14 (E4V4M6)  periorbital ecchymoses fading from left eye, trace right lid.   ·	HEENT	  EOMI, conjunctiva clear  	  · Respiratory	detailed exam  · Respiratory Details	breath sounds equal; respirations non-labored; reduced BS bases  · Cardiovascular	detailed exam  · Cardiovascular Details	regular rate and rhythm  · Gastrointestinal	detailed exam  · GI Normal	nontender; no distention; bowel sounds normal  	  · Extremities	 bialteral calves soft, NT no erythema or warmth  	  	  	  	                 Patient is a 65y old  Female who presents with a chief complaint of SAH and fractures of left-sided ribs, left iliac wing with hematoma, S/P closed reduction of left pinky PIP dislocation after trauma (19 May 2021 11:22)      HPI:  65F PMHx breast cancer s/p mastectomy, HTN, hypothyroidism, not on full anticoagulation/antiplatelets per EMS, presented to [facility] 4/19/21as a level 2 trauma activation after a fall from a flight of stairs. Patient only able to say "ow" in trauma bay, unable to provide further history. However per son, normal mentation at baseline. In the trauma bay, airway was intact with bilateral breath sounds, O2 saturation 100% on room air. Initially blood pressure was 140s systolic however on repeat 90s. NS@100 initiated. Secondary significant for left forehead laceration, left chest wall tenderness, left pelvic tenderness.     Ms. Arthur was hypotensive in the trauma bay and was transferred to the Surgical ICU after imaging was obtained. Her injury list is below.  (1) SAH, bilateral: stable on repeat imaging. Seven day course of Keppra for post-traumatic seizure prophylaxis.  (2) left 3-8 and right 7th rib fractures with occult left pneumothorax: pain control  (3) left iliac wing fracture with hematoma: required transfusion early in her ICU course, after which CBC were stable  (4) left forehead laceration s/p suture repair  (5) s/p successful closed reduction of left pinky PIP dislocation with delfin splint  (6) left clavicle fracture  (7) left nasal bone and left orbital rim fractures  (8) sternal fracture  Incidental findings: right renal lesion. Pt's  was notified and copy of the report given to him.     The patient was admitted to Trauma Surgery under SICU care.  Ortho was consulted for L. iliac wing fx, sternal fx, L. clavicle fx- recommended WBAT of the affected left lower extremity with walker, WBAT L shoulder, pendulum exercises okay, no lifting, sling for comfort, Encourage ROM of elbow/wrist/hand, PT/OT consult, NO urgent orthopedic surgical intervention at this time.    Neurosurgery consulted- recommended repeat CTH, which was stable.     PRS was consulted for non displaced left superior orbital rim and non displaced left nasal bone fracture--> No indication for operative fixation of these non displaced fractures; recommend ice to face to aid in swelling and analgesia.    She was placed on hemorrhage watch in the SICU given pelvic fracture and surrounding hematoma.  Behavior health was consulted for delirium.    Hand was consulted for L. pinky PIP dislocation- s/p closed reduction, rec Cont delfin taping for now.    4/21- CTH stable  - Added home lexapro  - Added NS @ 50cc/hr to supplement poor PO intake  - Lactate cleared (1.9)    4/22- Transfused 1u pRBC, Hct 20.9 -> 26.9  - Went into SVT, given adenosine x1 and started on metoprolol 25mg q12hr  - Added oxycodone 5mg q4hrs prn for increased pain control  - PO intake improved, IVL    4/23- EP was consulted for SVT, recc continue lopressor 25mg   Metoprolol PO increased from 25mg q12hrs to 25mg q6hrs.   - Added salt tabs 1g q8hrs and started on NS @ 50cc/hr for hyponatremia  - Avila discontinued, passed TOV  - Added lovenox  - Cervical collar cleared by confrontational exam     4/24- Patient transferred from SICU to surgical floor in stable condition.  Discharge planning to acute rehab.     Stable from neurologic perspective consistent with mild TBI  Multiple rib fractures - breathing comfortable with multimodal pain control    On day of discharge, the patient was tolerating diet, working with PT well and pain controlled. The patient was medically stable for discharge to acute rehab with instructions for outpatient follow up 4/27/21. 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    C Section    Abnormality, eye  h/o left eye vitrectomy    S/P D&amp;C (status post dilation and curettage)    H/O left mastectomy    Allergies    Tapazole (Hives)    Intolerances    MEDICATIONS  (STANDING):  BACItracin   Ointment 1 Application(s) Topical daily  bethanechol 10 milliGRAM(s) Oral two times a day  enoxaparin Injectable 40 milliGRAM(s) SubCutaneous every 24 hours  escitalopram 10 milliGRAM(s) Oral daily  folic acid 1 milliGRAM(s) Oral daily  levothyroxine 112 MICROGram(s) Oral daily  lidocaine   Patch 1 Patch Transdermal daily  metoprolol succinate  milliGRAM(s) Oral daily  modafinil 100 milliGRAM(s) Oral <User Schedule>  multivitamin 1 Tablet(s) Oral daily  nystatin Powder 1 Application(s) Topical two times a day  oxyCODONE    IR 10 milliGRAM(s) Oral <User Schedule>  polyethylene glycol 3350 17 Gram(s) Oral daily  ramelteon 8 milliGRAM(s) Oral at bedtime  saccharomyces boulardii 250 milliGRAM(s) Oral two times a day  senna 2 Tablet(s) Oral at bedtime  tamsulosin 0.4 milliGRAM(s) Oral at bedtime  zinc oxide 40% Ointment 1 Application(s) Topical two times a day    MEDICATIONS  (PRN):  acetaminophen   Tablet .. 650 milliGRAM(s) Oral every 6 hours PRN Temp greater or equal to 38C (100.4F), Mild Pain (1 - 3)        Vital Signs Last 24 Hrs  T(C): 36.8 (20 May 2021 09:14), Max: 36.9 (19 May 2021 20:21)  T(F): 98.2 (20 May 2021 09:14), Max: 98.4 (19 May 2021 20:21)  HR: 98 (20 May 2021 09:14) (69 - 98)  BP: 124/83 (20 May 2021 09:14) (104/66 - 124/83)  BP(mean): --  RR: 14 (20 May 2021 09:14) (14 - 14)  SpO2: 92% (20 May 2021 09:14) (92% - 97%)        RECENT LABS:               11.6   7.43  )-----------( 179      ( 20 May 2021 05:00 )             36.0     05-20    142  |  107  |  23  ----------------------------<  122<H>  4.2   |  25  |  0.56    Ca    8.7      20 May 2021 05:00    TPro  6.3  /  Alb  3.0<L>  /  TBili  0.4  /  DBili  x   /  AST  13  /  ALT  22  /  AlkPhos  271<H>  05-20        Review of Systems:   · Additional ROS	Patient seen and assessed. She denied pain in neck, face, head. Patient endorsed poor sleep last night. Answering yes/no questions, but could not qualify why she had difficulty sleeping. Denies that it was from pain or noise    +avila in place due to failed TOV, persistent retention,  on case    Physical Exam:   · Constitutional	detailed exam  · Constitutional Details	no distress  · Constitutional Comments	alert, sustained eye opening. GCS=14 (E4V4M6)  periorbital ecchymoses and trace swelling left > right continues to fade with some yellowing at margins    ·	HEENT	  EOMI, conjunctiva clear  	  · Respiratory	detailed exam  · Respiratory Details	breath sounds equal; respirations non-labored; reduced BS bases  · Cardiovascular	detailed exam  · Cardiovascular Details	regular rate and rhythm  · Gastrointestinal	detailed exam  · GI Normal	nontender; no distention; bowel sounds normal  	  · Extremities	 bialteral calves soft, NT no erythema or warmth  	left shoulder: full resistance not applied due to WB status  AROM left 3/5 elbow flexion and extension 5/5 gross grasp 5/5  right shoulder, elbow flexion/ext and grasp 5/5  bialteral ankle PF and DF 5/5    appears slightly distracted required repetition to follow through exam today 5/20

## 2021-05-21 LAB
HCT VFR BLD CALC: 35.4 % — SIGNIFICANT CHANGE UP (ref 34.5–45)
HGB BLD-MCNC: 11.5 G/DL — SIGNIFICANT CHANGE UP (ref 11.5–15.5)
MCHC RBC-ENTMCNC: 29.9 PG — SIGNIFICANT CHANGE UP (ref 27–34)
MCHC RBC-ENTMCNC: 32.5 GM/DL — SIGNIFICANT CHANGE UP (ref 32–36)
MCV RBC AUTO: 91.9 FL — SIGNIFICANT CHANGE UP (ref 80–100)
NRBC # BLD: 0 /100 WBCS — SIGNIFICANT CHANGE UP (ref 0–0)
PLATELET # BLD AUTO: 181 K/UL — SIGNIFICANT CHANGE UP (ref 150–400)
RBC # BLD: 3.85 M/UL — SIGNIFICANT CHANGE UP (ref 3.8–5.2)
RBC # FLD: 14.4 % — SIGNIFICANT CHANGE UP (ref 10.3–14.5)
WBC # BLD: 8.12 K/UL — SIGNIFICANT CHANGE UP (ref 3.8–10.5)
WBC # FLD AUTO: 8.12 K/UL — SIGNIFICANT CHANGE UP (ref 3.8–10.5)

## 2021-05-21 PROCEDURE — 99232 SBSQ HOSP IP/OBS MODERATE 35: CPT

## 2021-05-21 RX ORDER — TRAZODONE HCL 50 MG
25 TABLET ORAL AT BEDTIME
Refills: 0 | Status: DISCONTINUED | OUTPATIENT
Start: 2021-05-21 | End: 2021-05-24

## 2021-05-21 RX ADMIN — OXYCODONE HYDROCHLORIDE 10 MILLIGRAM(S): 5 TABLET ORAL at 07:58

## 2021-05-21 RX ADMIN — LIDOCAINE 1 PATCH: 4 CREAM TOPICAL at 19:23

## 2021-05-21 RX ADMIN — ZINC OXIDE 1 APPLICATION(S): 200 OINTMENT TOPICAL at 17:05

## 2021-05-21 RX ADMIN — Medication 1 TABLET(S): at 11:47

## 2021-05-21 RX ADMIN — MODAFINIL 100 MILLIGRAM(S): 200 TABLET ORAL at 06:04

## 2021-05-21 RX ADMIN — TAMSULOSIN HYDROCHLORIDE 0.4 MILLIGRAM(S): 0.4 CAPSULE ORAL at 21:27

## 2021-05-21 RX ADMIN — Medication 112 MICROGRAM(S): at 06:05

## 2021-05-21 RX ADMIN — LIDOCAINE 1 PATCH: 4 CREAM TOPICAL at 11:47

## 2021-05-21 RX ADMIN — POLYETHYLENE GLYCOL 3350 17 GRAM(S): 17 POWDER, FOR SOLUTION ORAL at 11:47

## 2021-05-21 RX ADMIN — NYSTATIN CREAM 1 APPLICATION(S): 100000 CREAM TOPICAL at 17:05

## 2021-05-21 RX ADMIN — ESCITALOPRAM OXALATE 10 MILLIGRAM(S): 10 TABLET, FILM COATED ORAL at 11:47

## 2021-05-21 RX ADMIN — NYSTATIN CREAM 1 APPLICATION(S): 100000 CREAM TOPICAL at 06:05

## 2021-05-21 RX ADMIN — LIDOCAINE 1 PATCH: 4 CREAM TOPICAL at 23:48

## 2021-05-21 RX ADMIN — SENNA PLUS 2 TABLET(S): 8.6 TABLET ORAL at 21:27

## 2021-05-21 RX ADMIN — Medication 1 APPLICATION(S): at 11:47

## 2021-05-21 RX ADMIN — Medication 1 MILLIGRAM(S): at 11:47

## 2021-05-21 RX ADMIN — Medication 250 MILLIGRAM(S): at 06:05

## 2021-05-21 RX ADMIN — Medication 25 MILLIGRAM(S): at 21:27

## 2021-05-21 RX ADMIN — Medication 100 MILLIGRAM(S): at 06:05

## 2021-05-21 RX ADMIN — Medication 10 MILLIGRAM(S): at 06:05

## 2021-05-21 RX ADMIN — ZINC OXIDE 1 APPLICATION(S): 200 OINTMENT TOPICAL at 06:06

## 2021-05-21 RX ADMIN — OXYCODONE HYDROCHLORIDE 10 MILLIGRAM(S): 5 TABLET ORAL at 07:57

## 2021-05-21 RX ADMIN — Medication 250 MILLIGRAM(S): at 17:04

## 2021-05-21 RX ADMIN — Medication 10 MILLIGRAM(S): at 17:05

## 2021-05-21 RX ADMIN — ENOXAPARIN SODIUM 40 MILLIGRAM(S): 100 INJECTION SUBCUTANEOUS at 06:07

## 2021-05-21 NOTE — PROGRESS NOTE ADULT - ASSESSMENT
JONATHAN CHILD is a 65y with PMHx breast Ca S/P mastectomy, HTN, hypothyroidism who presented to [facility] 4/19/21 s/p fall down a flight of stairs with bilateral frontoparietal SAH; L nasal bone and L orbital rim fractures; sternal fracture; L 3-8, R 7th rib fractures;  L pneumothorax, L iliac wing fracture with hematoma S/P 1U pRBCs; left clavicle fx, left V finger PIP dislocation s/p CR. She is WBAT Left LE, NWB Left UE.     # tSAH  - Avoid NSAIDs  - F/u neurosurgery, Dr. Vikas Aquino, upon discharge  - Cont comprehensive rehab program, PT/OT/SLP 3 hours a day, 5 days a week.  - Precautions: falls, sternal , WB as above, AMS, cardiac, h/o breast CA    #RRT S/P Fall 5/17/21 with head strike and lacerations left lateral brow and forehead  - Stat CTH, CT cervical, CT C/A/P no acute findings 5/17.  - [place]. Wheat[place] ([facility] [place], imaging reviewed and no indication for acute transfer.   - Plastics consulted and appreciated 5/17, and forehead laceration x2 sutured. Local incision care with bacitracin. Suture removal in 5-8 days.  - Repeat Head CT 12 hours post on 5/18 unremarkable  - Lovenox restarted 5/19 as repeat head CT stable. Hgb 12.4 --> 11.6 5/20--> 11.5 5/21  - D/C neuro checks q 8 hours   - continue 2:1 supervision, reassessed 5/21    # Multiple fractures. WBAT LLE, NWB LUE   - cleared for ROM exercises for L elbow, wrist, hand.  L shoulder pendulum exercises. Sling to LUE for comfort. No lifting more than 1-3 pounds for 6 weeks.  - LSO brace whenever OOB.  LSO replaced 5/14  -  delfin tape dc'd for L 5th finger dislocation (per Dr. Choe 5/10)  - Continue Lovenox for 6 weeks total (through 6/3)  - pain management as below  - follow up:  ·	plastic surgeon Dr. Nell Choe, for facial laceration, L pinky dislocation, L nasal bone and orbital rim fractures.   ·	ortho, Dr. Reji Cardoza, for L clavicle and L pelvic wing fractures  ·	surgery, Dr. Dino Peña, for rib fractures    # Acute Hypoxic Respiratory Failure likely due to Acute B/L rib fractures, and small pneumothorax  - Incentive spirometry. Breathing exercises  - Supplemental Oxygen PRN to keep O2 sats > 92%  - O2 sat 94-95% 5/21    # Elevated Alk Phos likely 2/2 trauma, bone fractures  - Hepatic sono 5/7: Multiple gallstones, without evidence for gallbladder wall thickening or pericholecystic fluid.  - 5/7 GGT 37, AST  13  /  ALT  22  /  AlkPhos  271<H>  05-20 improving  - CMP 5/24    # H/o SVT  - Continue with toprol XL   - Can follow up in 3 months with EP Dr. Hernández [phone]. (elective ablation)    #R kidney lesion  - F/u Dr. Herrera outpatient or PCP    #Hypothyroidism  - Continue Synthroid 112mcg daily    # Depression: stable/improved  - Psychiatry f/u 5/11 read and appreciated:   ·	Continue Lexapro 10mg  ·	Continue Modafinil 100mg  ·	Switch Ramelteon 8mg to Trazodone 25mg QHS    # Pain  - Tylenol  - Lidoderm to L chest wall  - Scheduled Oxycodone 10mg at 8am before therapy to help improve pain control    # GI/Bowel:  - Senna QHS, Miralax Daily    # /Bladder:   - Avila placed 4/29/21 for urinary retention. Failed TOV 5/6   - Urine Cx: E. coli: started on Nitrofurantoin, switched to CTX for 3 day course, finished 5/18  - UA 5/17 negative  -  consult 5/18 appreciated  ·	Continue Bethanechol 10 bid  ·	Continue Flomax 0.4 mg daily  ·	Avila ordered 5/18 due to discomfort, continued straight caths, worsening PVR, restlessness leading to increased fall risk. Will likely need avila on dc per . Renewed 5/21    # Diet  - regular solids thin liquids  - 1:1 supervision for meals    # DVT ppx:  - Lovenox  - SCDs    # Case discussed in IDT 5/19/21:  - tolerating regular diet. +severe cognitive impairments, min assist transfers and ambulation, negotiates 4 step with min assist, set up/supervision eating, min assist grooming, mod assist UB/LB dressing, mod assist shower, max toileting  - Goal: supervision grooming, bathing LBD, Mark shower transfers, Mark transfers and ambulation  - JULI next week if neurologically and medically stable 5/24      #LABS  avila catheter  2:1 observation  CBC BMP 5/24    ---------------  Outpatient Follow-up (Specialty/Name of physician):  Silvina Herrera  INTERNAL MEDICINE  [address] [place] [address]  Phone: [phone]  Fax: [phone]  Follow Up Time: 1 week    Vikas Aquino (MD)  Neurosurgery  [address] [facility], [address] [place] [address]  Phone: [phone]  Fax: [phone]  Follow Up Time: 2 weeks    Nell Choe)  Plastic Surgery  [address] [facility], [address]  [place], [place] [id number]  Phone: [phone]  Fax: [phone]  Follow Up Time: 1 week    Reji Cardoza)  Orthopaedic Surgery  [address] [facility], [address]  [place] [address]  Phone: [phone]  Fax: [phone]  Follow Up Time: Routine    Dino Peña)  Surgery; Surgical Critical Care  [address] [facility], [address] [address]  Phone: [phone]  Fax: [phone]  Follow Up Time: Routine    [place] Hernández; PhD)  Cardiac Electrophysiology; Cardiovascular Disease; Internal Medicine  [facility] - Dept of Cardiology, [address] [place] [address]  Phone: [phone]  Fax: [phone]   JONATHAN CHILD is a 65y with PMHx breast Ca S/P mastectomy, HTN, hypothyroidism who presented to [facility] 4/19/21 s/p fall down a flight of stairs with bilateral frontoparietal SAH; L nasal bone and L orbital rim fractures; sternal fracture; L 3-8, R 7th rib fractures;  L pneumothorax, L iliac wing fracture with hematoma S/P 1U pRBCs; left clavicle fx, left V finger PIP dislocation s/p CR. She is WBAT Left LE, NWB Left UE.     # tSAH  - Avoid NSAIDs  - F/u neurosurgery, Dr. Vikas Aquino, upon discharge  - Cont comprehensive rehab program, PT/OT/SLP 3 hours a day, 5 days a week.  - Precautions: falls, sternal , WB as above, AMS, cardiac, h/o breast CA    #RRT S/P Fall 5/17/21 with head strike and lacerations left lateral brow and forehead  - Stat CTH, CT cervical, CT C/A/P no acute findings 5/17.  - [place] [place], imaging reviewed and no indication for acute transfer.   - Plastics consulted and appreciated 5/17, and forehead laceration x2 sutured. Local incision care with bacitracin. Suture removal in 5-8 days.  - Repeat Head CT 12 hours post on 5/18 unremarkable  - Lovenox restarted 5/19. Hgb 12.4 --> 11.6 5/20--> 11.5 5/21 stable  - D/C neuro checks q 8 hours, patient neurologically stable and on 2:1 observation  - continue 2:1 supervision, reassessed 5/21    # Multiple fractures. WBAT LLE, NWB LUE   - cleared for ROM exercises for L elbow, wrist, hand.  L shoulder pendulum exercises. Sling to LUE for comfort. No lifting more than 1-3 pounds for 6 weeks.  - LSO brace whenever OOB.  LSO replaced 5/14  -  delfin tape dc'd for L 5th finger dislocation (per Dr. Choe 5/10)  - Continue Lovenox for 6 weeks total (through 6/3)  - pain management as below  - follow up:  ·	plastic surgeon Dr. Nell Choe, for facial laceration, L pinky dislocation, L nasal bone and orbital rim fractures.   ·	ortho, Dr. Reji Cardoza, for L clavicle and L pelvic wing fractures  ·	surgery, Dr. Dino Peña, for rib fractures    # Acute Hypoxic Respiratory Failure likely due to Acute B/L rib fractures, and small pneumothorax  - Incentive spirometry. Breathing exercises  - Supplemental Oxygen PRN to keep O2 sats > 92%  - O2 sat 94-95% 5/21    # Elevated Alk Phos likely 2/2 trauma, bone fractures  - Hepatic sono 5/7: Multiple gallstones, without evidence for gallbladder wall thickening or pericholecystic fluid.  - 5/7 GGT 37, AST  13  /  ALT  22  /  AlkPhos  271<H>  05-20 improving  - CMP 5/24    # H/o SVT  - Continue with toprol XL   - Can follow up in 3 months with EP Dr. Hernández [phone]. (elective ablation)    #R kidney lesion  - F/u Dr. Herrera outpatient or PCP    #Hypothyroidism  - Continue Synthroid 112mcg daily    # Depression: stable/improved  - Psychiatry f/u 5/11 read and appreciated:   ·	Continue Lexapro 10mg  ·	Continue Modafinil 100mg  ·	Switch Ramelteon 8mg to Trazodone 25mg QHS due to persistent insomnia 5/21    # Pain  - Tylenol  - Lidoderm to L chest wall  - Scheduled Oxycodone 10mg at 8am before therapy to help improve pain control    # GI/Bowel:  - Senna QHS, Miralax Daily    # /Bladder:   - Avila placed 4/29/21 for urinary retention. Failed TOV 5/6   - Urine Cx: E. coli: started on Nitrofurantoin, switched to CTX for 3 day course, finished 5/18  - UA 5/17 negative  -  consult 5/18 appreciated  ·	Continue Bethanechol 10 bid  ·	Continue Flomax 0.4 mg daily  ·	Avila ordered 5/18 due to discomfort, continued straight caths, worsening PVR, restlessness leading to increased fall risk. Will likely need avila on dc per . Renewed 5/21, discussed with hospitalist, team    # Diet  - regular solids thin liquids  - 1:1 supervision for meals    # DVT ppx:  - Lovenox  - SCDs    # Case discussed in IDT 5/19/21:  - tolerating regular diet. +severe cognitive impairments, min assist transfers and ambulation, negotiates 4 step with min assist, set up/supervision eating, min assist grooming, mod assist UB/LB dressing, mod assist shower, max toileting  - Goal: supervision grooming, bathing LBD, Mark shower transfers, Mark transfers and ambulation  - JULI next week if neurologically and medically stable 5/24      #LABS  avila catheter  2:1 observation  CBC BMP 5/24  monitor sleep on trazodone    ---------------  Outpatient Follow-up (Specialty/Name of physician):  Silvina Herrera  INTERNAL MEDICINE  [address] [place] [address]  Phone: [phone]  Fax: [phone]  Follow Up Time: 1 week    Vikas Aquino)  Neurosurgery  [address] [place] [address]  Phone: [phone]  Fax: [phone]  Follow Up Time: 2 weeks    Nell Choe)  Plastic Surgery  [address] [facility], [address]  [place], [place] [address]  Phone: [phone]  Fax: [phone]  Follow Up Time: 1 week    Reji Cardoza)  Orthopaedic Surgery  [address] [facility] [address]  [place] [address]  Phone: [phone]  Fax: [phone]  Follow Up Time: Routine    Dino Peña)  Surgery; Surgical Critical Care  [address] [facility], [facility] [address], [place] [address]  Phone: [phone]  Fax: [phone]  Follow Up Time: Routine    [place] Hernández; PhD)  Cardiac Electrophysiology; Cardiovascular Disease; Internal Medicine  [facility] - Dept of Cardiology, [address] [place] [address]  Phone: [phone]  Fax: [phone]

## 2021-05-21 NOTE — PROGRESS NOTE ADULT - SUBJECTIVE AND OBJECTIVE BOX
Patient is a 65y old  Female who presents with a chief complaint of SAH and fractures of left-sided ribs, left iliac wing with hematoma, S/P closed reduction of left pinky PIP dislocation after trauma (20 May 2021 11:35)      Patient seen and examined ; PCA for safety monitoring at bedside reports uneventful night. Pt resting arousable, denies HA CP SOB rib pain N/V/D    ALLERGIES:  Tapazole (Hives)    MEDICATIONS  (STANDING):  BACItracin   Ointment 1 Application(s) Topical daily  bethanechol 10 milliGRAM(s) Oral two times a day  enoxaparin Injectable 40 milliGRAM(s) SubCutaneous every 24 hours  escitalopram 10 milliGRAM(s) Oral daily  folic acid 1 milliGRAM(s) Oral daily  levothyroxine 112 MICROGram(s) Oral daily  lidocaine   Patch 1 Patch Transdermal daily  metoprolol succinate  milliGRAM(s) Oral daily  modafinil 100 milliGRAM(s) Oral <User Schedule>  multivitamin 1 Tablet(s) Oral daily  nystatin Powder 1 Application(s) Topical two times a day  oxyCODONE    IR 10 milliGRAM(s) Oral <User Schedule>  polyethylene glycol 3350 17 Gram(s) Oral daily  ramelteon 8 milliGRAM(s) Oral at bedtime  saccharomyces boulardii 250 milliGRAM(s) Oral two times a day  senna 2 Tablet(s) Oral at bedtime  tamsulosin 0.4 milliGRAM(s) Oral at bedtime  zinc oxide 40% Ointment 1 Application(s) Topical two times a day    MEDICATIONS  (PRN):  acetaminophen   Tablet .. 650 milliGRAM(s) Oral every 6 hours PRN Temp greater or equal to 38C (100.4F), Mild Pain (1 - 3)    Vital Signs Last 24 Hrs  T(F): 98.2 (20 May 2021 20:01), Max: 98.2 (20 May 2021 09:14)  HR: 69 (21 May 2021 06:00) (69 - 98)  BP: 124/77 (21 May 2021 06:00) (102/64 - 124/83)  RR: 14 (20 May 2021 20:01) (14 - 14)  SpO2: 95% (20 May 2021 20:01) (92% - 95%)  I&O's Summary    20 May 2021 07:01  -  21 May 2021 07:00  --------------------------------------------------------  IN: 0 mL / OUT: 850 mL / NET: -850 mL  PHYSICAL EXAM:  General: NAD  HEENT: left frontal forehead dressing c/d/i; bilateral suborbital ecchymoses. MMM  Neck: Supple, No JVD  Lungs: Clear to auscultation bilaterally  Cardio: RRR, S1/S2, No murmurs  Abdomen: Soft, Nontender, Nondistended; Bowel sounds present  Extremities: No calf tenderness, No pitting edema  Neuro: arousable but attention poorly sustained    LABS:                        11.5   8.12  )-----------( 181      ( 21 May 2021 07:06 )             35.4       05-20    142  |  107  |  23  ----------------------------<  122  4.2   |  25  |  0.56    Ca    8.7      20 May 2021 05:00    TPro  6.3  /  Alb  3.0  /  TBili  0.4  /  DBili  x   /  AST  13  /  ALT  22  /  AlkPhos  271  05-20     eGFR if Non African American: 96 mL/min/1.73M2 (05-20-21 @ 05:00)  eGFR if : 112 mL/min/1.73M2 (05-20-21 @ 05:00)                             COVID-19 PCR: NotDetec (05-16-21 @ 05:00)  COVID-19 PCR: NotDetec (05-10-21 @ 05:00)  COVID-19 PCR: NotDetec (05-04-21 @ 05:00)  COVID-19 PCR: NotDetec (04-27-21 @ 18:10)  COVID-19 PCR: NotDetec (04-26-21 @ 06:33)

## 2021-05-21 NOTE — PROGRESS NOTE ADULT - ASSESSMENT
66 yo woman with PMHx breast cancer s/p mastectomy, HTN, Hypothyroidism admitted to [facility] after hospital course at [facility] after a fall from a flight of stairs, sustaining SAH, left-sided rib fractures, left iliac wing fracture with hematoma, left forehead laceration, s/p successful closed reduction of left pinky PIP dislocation, Acute L3 compression fracture, 4/29 admitted to [facility]    s/p fall at home w/ resultant SAH and multiple fxs, ecchymoses and cognitive impairment/ TBI  fall again at [facility]  rehab 5/17   - continue comprehensive rehabilitation program per PMR.   - continue safety monitoring   - FALL PRECAUTIONS  - pain meds per PMR team  - close monitoring for changes in exam     Urinary retention  Constipation  - straight cath/ PVR per protocol  - Urology following  - continue Flomax / urecholine  - will need Spangler when discharged if retention persists  - Continue bowel regimen    Normocytic Anemia with folic acid deficiency - stable  - monitor H/H    Elevated ALKPHOS  - 2/2 multiple fractures   - avoid hepatotoxins    Episodes of SVT   - Continue with toprol XL  100mg daily  - Can follow up in 3 months with EP Dr. Hernández [phone]. (elective ablation)    Hypothyroidism  - Continue Synthroid 112mcg daily    Cognitive impairment / TBI  Acute adjustment disorder with depressed mood    -  continue modafinil for cognitive stimulation    Insomnia  - melatonin     s/p Acute Hypoxic Respiratory Failure likely due to Acute B/L rib fractures, and small pneumothorax  - resolved  - Incentive spirometry.   - Supplemental Oxygen as needed for O2 goal > 92%    VTE PPX   - Lovenox      Attestation: I have personally interviewed and examined this patient, reviewed pertinent clinical information, and performed the evaluation and management services provided at today's visit for inpatient medical follow up    I am available to discuss any issues related to the medical care of this patient on the unit, or by phone at [phone]    will d/w rehab team during multidiscliplinary rounds

## 2021-05-21 NOTE — PROGRESS NOTE ADULT - SUBJECTIVE AND OBJECTIVE BOX
Patient is a 65y old  Female who presents with a chief complaint of SAH and fractures of left-sided ribs, left iliac wing with hematoma, S/P closed reduction of left pinky PIP dislocation after trauma (19 May 2021 11:22)      HPI:  65F PMHx breast cancer s/p mastectomy, HTN, hypothyroidism, not on full anticoagulation/antiplatelets per EMS, presented to [facility] 4/19/21as a level 2 trauma activation after a fall from a flight of stairs. Patient only able to say "ow" in trauma bay, unable to provide further history. However per son, normal mentation at baseline. In the trauma bay, airway was intact with bilateral breath sounds, O2 saturation 100% on room air. Initially blood pressure was 140s systolic however on repeat 90s. NS@100 initiated. Secondary significant for left forehead laceration, left chest wall tenderness, left pelvic tenderness.     Ms. Arthur was hypotensive in the trauma bay and was transferred to the Surgical ICU after imaging was obtained. Her injury list is below.  (1) SAH, bilateral: stable on repeat imaging. Seven day course of Keppra for post-traumatic seizure prophylaxis.  (2) left 3-8 and right 7th rib fractures with occult left pneumothorax: pain control  (3) left iliac wing fracture with hematoma: required transfusion early in her ICU course, after which CBC were stable  (4) left forehead laceration s/p suture repair  (5) s/p successful closed reduction of left pinky PIP dislocation with delfin splint  (6) left clavicle fracture  (7) left nasal bone and left orbital rim fractures  (8) sternal fracture  Incidental findings: right renal lesion. Pt's  was notified and copy of the report given to him.     The patient was admitted to Trauma Surgery under SICU care.  Ortho was consulted for L. iliac wing fx, sternal fx, L. clavicle fx- recommended WBAT of the affected left lower extremity with walker, WBAT L shoulder, pendulum exercises okay, no lifting, sling for comfort, Encourage ROM of elbow/wrist/hand, PT/OT consult, NO urgent orthopedic surgical intervention at this time.    Neurosurgery consulted- recommended repeat CTH, which was stable.     PRS was consulted for non displaced left superior orbital rim and non displaced left nasal bone fracture--> No indication for operative fixation of these non displaced fractures; recommend ice to face to aid in swelling and analgesia.    She was placed on hemorrhage watch in the SICU given pelvic fracture and surrounding hematoma.  Behavior health was consulted for delirium.    Hand was consulted for L. pinky PIP dislocation- s/p closed reduction, rec Cont delfin taping for now.    4/21- CTH stable  - Added home lexapro  - Added NS @ 50cc/hr to supplement poor PO intake  - Lactate cleared (1.9)    4/22- Transfused 1u pRBC, Hct 20.9 -> 26.9  - Went into SVT, given adenosine x1 and started on metoprolol 25mg q12hr  - Added oxycodone 5mg q4hrs prn for increased pain control  - PO intake improved, IVL    4/23- EP was consulted for SVT, recc continue lopressor 25mg   Metoprolol PO increased from 25mg q12hrs to 25mg q6hrs.   - Added salt tabs 1g q8hrs and started on NS @ 50cc/hr for hyponatremia  - Avila discontinued, passed TOV  - Added lovenox  - Cervical collar cleared by confrontational exam     4/24- Patient transferred from SICU to surgical floor in stable condition.  Discharge planning to acute rehab.     Stable from neurologic perspective consistent with mild TBI  Multiple rib fractures - breathing comfortable with multimodal pain control    On day of discharge, the patient was tolerating diet, working with PT well and pain controlled. The patient was medically stable for discharge to acute rehab with instructions for outpatient follow up 4/27/21. 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    C Section    Abnormality, eye  h/o left eye vitrectomy    S/P D&amp;C (status post dilation and curettage)    H/O left mastectomy    Allergies    Tapazole (Hives)    Intolerances      MEDICATIONS  (STANDING):  BACItracin   Ointment 1 Application(s) Topical daily  bethanechol 10 milliGRAM(s) Oral two times a day  enoxaparin Injectable 40 milliGRAM(s) SubCutaneous every 24 hours  escitalopram 10 milliGRAM(s) Oral daily  folic acid 1 milliGRAM(s) Oral daily  levothyroxine 112 MICROGram(s) Oral daily  lidocaine   Patch 1 Patch Transdermal daily  metoprolol succinate  milliGRAM(s) Oral daily  modafinil 100 milliGRAM(s) Oral <User Schedule>  multivitamin 1 Tablet(s) Oral daily  nystatin Powder 1 Application(s) Topical two times a day  oxyCODONE    IR 10 milliGRAM(s) Oral <User Schedule>  polyethylene glycol 3350 17 Gram(s) Oral daily  saccharomyces boulardii 250 milliGRAM(s) Oral two times a day  senna 2 Tablet(s) Oral at bedtime  tamsulosin 0.4 milliGRAM(s) Oral at bedtime  traZODone 25 milliGRAM(s) Oral at bedtime  zinc oxide 40% Ointment 1 Application(s) Topical two times a day    MEDICATIONS  (PRN):  acetaminophen   Tablet .. 650 milliGRAM(s) Oral every 6 hours PRN Temp greater or equal to 38C (100.4F), Mild Pain (1 - 3)          Vital Signs Last 24 Hrs  T(C): 36.7 (21 May 2021 09:33), Max: 36.8 (20 May 2021 20:01)  T(F): 98 (21 May 2021 09:33), Max: 98.2 (20 May 2021 20:01)  HR: 68 (21 May 2021 09:33) (68 - 76)  BP: 111/71 (21 May 2021 09:33) (102/64 - 124/77)  BP(mean): --  RR: 14 (21 May 2021 09:33) (14 - 14)  SpO2: 94% (21 May 2021 09:33) (94% - 95%)      RECENT LABS                        11.5   8.12  )-----------( 181      ( 21 May 2021 07:06 )             35.4     05-20    142  |  107  |  23  ----------------------------<  122<H>  4.2   |  25  |  0.56    Ca    8.7      20 May 2021 05:00    TPro  6.3  /  Alb  3.0<L>  /  TBili  0.4  /  DBili  x   /  AST  13  /  ALT  22  /  AlkPhos  271<H>  05-20              Review of Systems:   · Additional ROS	Patient seen and assessed. She denied pain in neck, face, head. Patient endorsed poor sleep last night. Answering yes/no questions. She again could not qualify why she had difficulty sleeping but denies that it was from pain, noise, anxiety, racing thoughts    +avila in place due to failed TOV, persistent retention,  on case    Physical Exam:   · Constitutional	detailed exam  · Constitutional Details	no distress  · Constitutional Comments	alert, sustained eye opening. GCS=14 (E4V4M6)  periorbital ecchymoses and trace swelling left > right continues to fade with some yellowing at margins    ·	HEENT	  EOMI, conjunctiva clear  	  · Respiratory	detailed exam  · Respiratory Details	breath sounds equal; respirations non-labored; reduced BS bases  · Cardiovascular	detailed exam  · Cardiovascular Details	regular rate and rhythm  · Gastrointestinal	detailed exam  · GI Normal	nontender; no distention; bowel sounds normal  	  · Extremities	 bialteral calves soft, NT no erythema or warmth  	left shoulder: full resistance not applied due to WB status  AROM left 3/5 elbow flexion and extension 5/5 gross grasp 5/5  right shoulder, elbow flexion/ext and grasp 5/5  bialteral ankle PF and DF 5/5    appears slightly distracted required repetition to follow through exam today 5/20  	  	  	                 Patient is a 65y old  Female who presents with a chief complaint of SAH and fractures of left-sided ribs, left iliac wing with hematoma, S/P closed reduction of left pinky PIP dislocation after trauma (19 May 2021 11:22)      HPI:  65F PMHx breast cancer s/p mastectomy, HTN, hypothyroidism, not on full anticoagulation/antiplatelets per EMS, presented to [facility] 4/19/21as a level 2 trauma activation after a fall from a flight of stairs. Patient only able to say "ow" in trauma bay, unable to provide further history. However per son, normal mentation at baseline. In the trauma bay, airway was intact with bilateral breath sounds, O2 saturation 100% on room air. Initially blood pressure was 140s systolic however on repeat 90s. NS@100 initiated. Secondary significant for left forehead laceration, left chest wall tenderness, left pelvic tenderness.     Ms. Arthur was hypotensive in the trauma bay and was transferred to the Surgical ICU after imaging was obtained. Her injury list is below.  (1) SAH, bilateral: stable on repeat imaging. Seven day course of Keppra for post-traumatic seizure prophylaxis.  (2) left 3-8 and right 7th rib fractures with occult left pneumothorax: pain control  (3) left iliac wing fracture with hematoma: required transfusion early in her ICU course, after which CBC were stable  (4) left forehead laceration s/p suture repair  (5) s/p successful closed reduction of left pinky PIP dislocation with delfin splint  (6) left clavicle fracture  (7) left nasal bone and left orbital rim fractures  (8) sternal fracture  Incidental findings: right renal lesion. Pt's  was notified and copy of the report given to him.     The patient was admitted to Trauma Surgery under SICU care.  Ortho was consulted for L. iliac wing fx, sternal fx, L. clavicle fx- recommended WBAT of the affected left lower extremity with walker, WBAT L shoulder, pendulum exercises okay, no lifting, sling for comfort, Encourage ROM of elbow/wrist/hand, PT/OT consult, NO urgent orthopedic surgical intervention at this time.    Neurosurgery consulted- recommended repeat CTH, which was stable.     PRS was consulted for non displaced left superior orbital rim and non displaced left nasal bone fracture--> No indication for operative fixation of these non displaced fractures; recommend ice to face to aid in swelling and analgesia.    She was placed on hemorrhage watch in the SICU given pelvic fracture and surrounding hematoma.  Behavior health was consulted for delirium.    Hand was consulted for L. pinky PIP dislocation- s/p closed reduction, rec Cont delfin taping for now.    4/21- CTH stable  - Added home lexapro  - Added NS @ 50cc/hr to supplement poor PO intake  - Lactate cleared (1.9)    4/22- Transfused 1u pRBC, Hct 20.9 -> 26.9  - Went into SVT, given adenosine x1 and started on metoprolol 25mg q12hr  - Added oxycodone 5mg q4hrs prn for increased pain control  - PO intake improved, IVL    4/23- EP was consulted for SVT, recc continue lopressor 25mg   Metoprolol PO increased from 25mg q12hrs to 25mg q6hrs.   - Added salt tabs 1g q8hrs and started on NS @ 50cc/hr for hyponatremia  - Avila discontinued, passed TOV  - Added lovenox  - Cervical collar cleared by confrontational exam     4/24- Patient transferred from SICU to surgical floor in stable condition.  Discharge planning to acute rehab.     Stable from neurologic perspective consistent with mild TBI  Multiple rib fractures - breathing comfortable with multimodal pain control    On day of discharge, the patient was tolerating diet, working with PT well and pain controlled. The patient was medically stable for discharge to acute rehab with instructions for outpatient follow up 4/27/21. 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    C Section    Abnormality, eye  h/o left eye vitrectomy    S/P D&amp;C (status post dilation and curettage)    H/O left mastectomy    Allergies    Tapazole (Hives)    Intolerances      MEDICATIONS  (STANDING):  BACItracin   Ointment 1 Application(s) Topical daily  bethanechol 10 milliGRAM(s) Oral two times a day  enoxaparin Injectable 40 milliGRAM(s) SubCutaneous every 24 hours  escitalopram 10 milliGRAM(s) Oral daily  folic acid 1 milliGRAM(s) Oral daily  levothyroxine 112 MICROGram(s) Oral daily  lidocaine   Patch 1 Patch Transdermal daily  metoprolol succinate  milliGRAM(s) Oral daily  modafinil 100 milliGRAM(s) Oral <User Schedule>  multivitamin 1 Tablet(s) Oral daily  nystatin Powder 1 Application(s) Topical two times a day  oxyCODONE    IR 10 milliGRAM(s) Oral <User Schedule>  polyethylene glycol 3350 17 Gram(s) Oral daily  saccharomyces boulardii 250 milliGRAM(s) Oral two times a day  senna 2 Tablet(s) Oral at bedtime  tamsulosin 0.4 milliGRAM(s) Oral at bedtime  traZODone 25 milliGRAM(s) Oral at bedtime  zinc oxide 40% Ointment 1 Application(s) Topical two times a day    MEDICATIONS  (PRN):  acetaminophen   Tablet .. 650 milliGRAM(s) Oral every 6 hours PRN Temp greater or equal to 38C (100.4F), Mild Pain (1 - 3)          Vital Signs Last 24 Hrs  T(C): 36.7 (21 May 2021 09:33), Max: 36.8 (20 May 2021 20:01)  T(F): 98 (21 May 2021 09:33), Max: 98.2 (20 May 2021 20:01)  HR: 68 (21 May 2021 09:33) (68 - 76)  BP: 111/71 (21 May 2021 09:33) (102/64 - 124/77)  BP(mean): --  RR: 14 (21 May 2021 09:33) (14 - 14)  SpO2: 94% (21 May 2021 09:33) (94% - 95%)      RECENT LABS                        11.5   8.12  )-----------( 181      ( 21 May 2021 07:06 )             35.4     05-20    142  |  107  |  23  ----------------------------<  122<H>  4.2   |  25  |  0.56    Ca    8.7      20 May 2021 05:00    TPro  6.3  /  Alb  3.0<L>  /  TBili  0.4  /  DBili  x   /  AST  13  /  ALT  22  /  AlkPhos  271<H>  05-20              Review of Systems:   · Additional ROS	 Patient endorsed poor sleep last night. Answering yes/no questions. She again could not qualify why she had difficulty sleeping but denies that it was from pain, noise, anxiety, racing thoughts. Seen also later with PT, not noted to have significant change from earlier in week, perhaps sl less restless but still has moments of impulivity    +avila     Physical Exam:   · Constitutional	detailed exam  · Constitutional Details	no distress  · Constitutional Comments	 GCS=14 (E4V4M6) +periorbital ecchymoses improving. Swelling sig improved NAD +aphasic    ·	HEENT	  EOMI, conjunctiva clear  	  · Respiratory	detailed exam  · Respiratory Details	breath sounds equal; respirations non-labored; reduced BS bases  · Cardiovascular	detailed exam  · Cardiovascular Details	regular rate and rhythm  · Gastrointestinal	detailed exam  · GI Normal	nontender; no distention; bowel sounds normal  	  · Extremities	 bialteral calves soft, NT no erythema or warmth

## 2021-05-22 LAB — SARS-COV-2 RNA SPEC QL NAA+PROBE: SIGNIFICANT CHANGE UP

## 2021-05-22 PROCEDURE — 99232 SBSQ HOSP IP/OBS MODERATE 35: CPT

## 2021-05-22 RX ADMIN — ENOXAPARIN SODIUM 40 MILLIGRAM(S): 100 INJECTION SUBCUTANEOUS at 06:32

## 2021-05-22 RX ADMIN — ZINC OXIDE 1 APPLICATION(S): 200 OINTMENT TOPICAL at 06:33

## 2021-05-22 RX ADMIN — ZINC OXIDE 1 APPLICATION(S): 200 OINTMENT TOPICAL at 17:17

## 2021-05-22 RX ADMIN — Medication 10 MILLIGRAM(S): at 17:18

## 2021-05-22 RX ADMIN — OXYCODONE HYDROCHLORIDE 10 MILLIGRAM(S): 5 TABLET ORAL at 08:15

## 2021-05-22 RX ADMIN — SENNA PLUS 2 TABLET(S): 8.6 TABLET ORAL at 22:10

## 2021-05-22 RX ADMIN — OXYCODONE HYDROCHLORIDE 10 MILLIGRAM(S): 5 TABLET ORAL at 08:00

## 2021-05-22 RX ADMIN — ESCITALOPRAM OXALATE 10 MILLIGRAM(S): 10 TABLET, FILM COATED ORAL at 12:19

## 2021-05-22 RX ADMIN — POLYETHYLENE GLYCOL 3350 17 GRAM(S): 17 POWDER, FOR SOLUTION ORAL at 12:19

## 2021-05-22 RX ADMIN — Medication 1 TABLET(S): at 12:19

## 2021-05-22 RX ADMIN — LIDOCAINE 1 PATCH: 4 CREAM TOPICAL at 12:19

## 2021-05-22 RX ADMIN — NYSTATIN CREAM 1 APPLICATION(S): 100000 CREAM TOPICAL at 17:17

## 2021-05-22 RX ADMIN — Medication 1 MILLIGRAM(S): at 12:19

## 2021-05-22 RX ADMIN — Medication 112 MICROGRAM(S): at 06:32

## 2021-05-22 RX ADMIN — TAMSULOSIN HYDROCHLORIDE 0.4 MILLIGRAM(S): 0.4 CAPSULE ORAL at 22:10

## 2021-05-22 RX ADMIN — Medication 1 APPLICATION(S): at 12:19

## 2021-05-22 RX ADMIN — Medication 5 MILLIGRAM(S): at 11:11

## 2021-05-22 RX ADMIN — MODAFINIL 100 MILLIGRAM(S): 200 TABLET ORAL at 06:32

## 2021-05-22 RX ADMIN — NYSTATIN CREAM 1 APPLICATION(S): 100000 CREAM TOPICAL at 06:33

## 2021-05-22 RX ADMIN — LIDOCAINE 1 PATCH: 4 CREAM TOPICAL at 19:49

## 2021-05-22 RX ADMIN — Medication 250 MILLIGRAM(S): at 17:18

## 2021-05-22 RX ADMIN — Medication 250 MILLIGRAM(S): at 06:32

## 2021-05-22 RX ADMIN — Medication 25 MILLIGRAM(S): at 22:10

## 2021-05-22 RX ADMIN — Medication 10 MILLIGRAM(S): at 06:32

## 2021-05-22 RX ADMIN — Medication 100 MILLIGRAM(S): at 06:32

## 2021-05-22 NOTE — PROGRESS NOTE ADULT - SUBJECTIVE AND OBJECTIVE BOX
Patient is a 65y old  Female who presents with a chief complaint of SAH and fractures of left-sided ribs, left iliac wing with hematoma, S/P closed reduction of left pinky PIP dislocation after trauma (19 May 2021 11:22)      HPI:  65F PMHx breast cancer s/p mastectomy, HTN, hypothyroidism, not on full anticoagulation/antiplatelets per EMS, presented to [facility] 4/19/21as a level 2 trauma activation after a fall from a flight of stairs. Patient only able to say "ow" in trauma bay, unable to provide further history. However per son, normal mentation at baseline. In the trauma bay, airway was intact with bilateral breath sounds, O2 saturation 100% on room air. Initially blood pressure was 140s systolic however on repeat 90s. NS@100 initiated. Secondary significant for left forehead laceration, left chest wall tenderness, left pelvic tenderness.     Ms. Arthur was hypotensive in the trauma bay and was transferred to the Surgical ICU after imaging was obtained. Her injury list is below.  (1) SAH, bilateral: stable on repeat imaging. Seven day course of Keppra for post-traumatic seizure prophylaxis.  (2) left 3-8 and right 7th rib fractures with occult left pneumothorax: pain control  (3) left iliac wing fracture with hematoma: required transfusion early in her ICU course, after which CBC were stable  (4) left forehead laceration s/p suture repair  (5) s/p successful closed reduction of left pinky PIP dislocation with delfin splint  (6) left clavicle fracture  (7) left nasal bone and left orbital rim fractures  (8) sternal fracture  Incidental findings: right renal lesion. Pt's  was notified and copy of the report given to him.     The patient was admitted to Trauma Surgery under SICU care.  Ortho was consulted for L. iliac wing fx, sternal fx, L. clavicle fx- recommended WBAT of the affected left lower extremity with walker, WBAT L shoulder, pendulum exercises okay, no lifting, sling for comfort, Encourage ROM of elbow/wrist/hand, PT/OT consult, NO urgent orthopedic surgical intervention at this time.    Neurosurgery consulted- recommended repeat CTH, which was stable.     PRS was consulted for non displaced left superior orbital rim and non displaced left nasal bone fracture--> No indication for operative fixation of these non displaced fractures; recommend ice to face to aid in swelling and analgesia.    She was placed on hemorrhage watch in the SICU given pelvic fracture and surrounding hematoma.  Behavior health was consulted for delirium.    Hand was consulted for L. pinky PIP dislocation- s/p closed reduction, rec Cont delfin taping for now.    4/21- CTH stable  - Added home lexapro  - Added NS @ 50cc/hr to supplement poor PO intake  - Lactate cleared (1.9)    4/22- Transfused 1u pRBC, Hct 20.9 -> 26.9  - Went into SVT, given adenosine x1 and started on metoprolol 25mg q12hr  - Added oxycodone 5mg q4hrs prn for increased pain control  - PO intake improved, IVL    4/23- EP was consulted for SVT, recc continue lopressor 25mg   Metoprolol PO increased from 25mg q12hrs to 25mg q6hrs.   - Added salt tabs 1g q8hrs and started on NS @ 50cc/hr for hyponatremia  - Avila discontinued, passed TOV  - Added lovenox  - Cervical collar cleared by confrontational exam     4/24- Patient transferred from SICU to surgical floor in stable condition.  Discharge planning to acute rehab.     Stable from neurologic perspective consistent with mild TBI  Multiple rib fractures - breathing comfortable with multimodal pain control    On day of discharge, the patient was tolerating diet, working with PT well and pain controlled. The patient was medically stable for discharge to acute rehab with instructions for outpatient follow up 4/27/21. 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    C Section    Abnormality, eye  h/o left eye vitrectomy    S/P D&amp;C (status post dilation and curettage)    H/O left mastectomy    Allergies    Tapazole (Hives)    Intolerances      MEDICATIONS  (STANDING):  BACItracin   Ointment 1 Application(s) Topical daily  bethanechol 10 milliGRAM(s) Oral two times a day  enoxaparin Injectable 40 milliGRAM(s) SubCutaneous every 24 hours  escitalopram 10 milliGRAM(s) Oral daily  folic acid 1 milliGRAM(s) Oral daily  levothyroxine 112 MICROGram(s) Oral daily  lidocaine   Patch 1 Patch Transdermal daily  metoprolol succinate  milliGRAM(s) Oral daily  modafinil 100 milliGRAM(s) Oral <User Schedule>  multivitamin 1 Tablet(s) Oral daily  nystatin Powder 1 Application(s) Topical two times a day  oxyCODONE    IR 10 milliGRAM(s) Oral <User Schedule>  polyethylene glycol 3350 17 Gram(s) Oral daily  saccharomyces boulardii 250 milliGRAM(s) Oral two times a day  senna 2 Tablet(s) Oral at bedtime  tamsulosin 0.4 milliGRAM(s) Oral at bedtime  traZODone 25 milliGRAM(s) Oral at bedtime  zinc oxide 40% Ointment 1 Application(s) Topical two times a day    MEDICATIONS  (PRN):  acetaminophen   Tablet .. 650 milliGRAM(s) Oral every 6 hours PRN Temp greater or equal to 38C (100.4F), Mild Pain (1 - 3)    ROS/Subjective:  Patient seen this morning at bedside. Patient in bed in NAD, no SOB, no n/v, no pain. (+)avila      Vital Signs Last 24 Hrs  T(C): 37 (21 May 2021 21:22), Max: 37 (21 May 2021 21:22)  T(F): 98.6 (21 May 2021 21:22), Max: 98.6 (21 May 2021 21:22)  HR: 68 (22 May 2021 06:29) (68 - 70)  BP: 123/81 (22 May 2021 06:29) (110/73 - 123/81)  BP(mean): --  RR: 14 (21 May 2021 21:22) (14 - 14)  SpO2: 95% (21 May 2021 21:22) (94% - 95%)    RECENT LABS           LABS:                 11.5   8.12  )-----------( 181      ( 21 May 2021 07:06 )             35.4     05-20    142  |  107  |  23  ----------------------------<  122<H>  4.2   |  25  |  0.56    Ca    8.7      20 May 2021 05:00    TPro  6.3  /  Alb  3.0<L>  /  TBili  0.4  /  DBili  x   /  AST  13  /  ALT  22  /  AlkPhos  271<H>  05-20            Physical Exam:   · Constitutional	detailed exam  · Constitutional Details	no distress  · Constitutional Comments	 +periorbital ecchymoses improving. Swelling sig improved NAD +aphasic    ·	HEENT	  EOMI, conjunctiva clear. (+)left frontal sutures, no erythema, no d/c  	  · Respiratory	detailed exam  · Respiratory Details	breath sounds equal; respirations non-labored; reduced BS bases  · Cardiovascular	detailed exam  · Cardiovascular Details	regular rate and rhythm  · Gastrointestinal	detailed exam  · GI Normal	nontender; no distention; bowel sounds normal  	  · Extremities	 bialteral calves soft, NT no erythema or warmth  	  Motor: no focal deficits

## 2021-05-22 NOTE — PROGRESS NOTE ADULT - SUBJECTIVE AND OBJECTIVE BOX
Patient is a 65y old  Female who presents with a chief complaint of SAH and fractures of left-sided ribs, left iliac wing with hematoma, S/P closed reduction of left pinky PIP dislocation after trauma (22 May 2021 07:58)    Patient seen and examined at bedside. Her pain is mostly controlled. She has had no BM since 5/19 but denies abdominal pain. There were no other acute medical issues reported    ALLERGIES:  Tapazole (Hives)    MEDICATIONS  (STANDING):  BACItracin   Ointment 1 Application(s) Topical daily  bethanechol 10 milliGRAM(s) Oral two times a day  bisacodyl 5 milliGRAM(s) Oral once  enoxaparin Injectable 40 milliGRAM(s) SubCutaneous every 24 hours  escitalopram 10 milliGRAM(s) Oral daily  folic acid 1 milliGRAM(s) Oral daily  levothyroxine 112 MICROGram(s) Oral daily  lidocaine   Patch 1 Patch Transdermal daily  metoprolol succinate  milliGRAM(s) Oral daily  modafinil 100 milliGRAM(s) Oral <User Schedule>  multivitamin 1 Tablet(s) Oral daily  nystatin Powder 1 Application(s) Topical two times a day  oxyCODONE    IR 10 milliGRAM(s) Oral <User Schedule>  polyethylene glycol 3350 17 Gram(s) Oral daily  saccharomyces boulardii 250 milliGRAM(s) Oral two times a day  senna 2 Tablet(s) Oral at bedtime  tamsulosin 0.4 milliGRAM(s) Oral at bedtime  traZODone 25 milliGRAM(s) Oral at bedtime  zinc oxide 40% Ointment 1 Application(s) Topical two times a day    MEDICATIONS  (PRN):  acetaminophen   Tablet .. 650 milliGRAM(s) Oral every 6 hours PRN Temp greater or equal to 38C (100.4F), Mild Pain (1 - 3)    Vital Signs Last 24 Hrs  T(F): 98.1 (22 May 2021 09:06), Max: 98.6 (21 May 2021 21:22)  HR: 66 (22 May 2021 09:06) (66 - 70)  BP: 106/72 (22 May 2021 09:06) (106/72 - 123/81)  RR: 14 (22 May 2021 09:06) (14 - 14)  SpO2: 92% (22 May 2021 09:06) (92% - 95%)  I&O's Summary    21 May 2021 07:01  -  22 May 2021 07:00  --------------------------------------------------------  IN: 0 mL / OUT: 1500 mL / NET: -1500 mL      PHYSICAL EXAM:  General: NAD, A/O x 3  ENT: MMM Ecchymoses resolving periorbital  Neck: Supple, No JVD  Lungs: Clear to auscultation bilaterally  Cardio: RRR, S1/S2, No murmurs  Abdomen: Soft, Nontender, Nondistended; Bowel sounds present  Extremities: No calf tenderness, No pitting edema    LABS:                        11.5   8.12  )-----------( 181      ( 21 May 2021 07:06 )             35.4       05-20    142  |  107  |  23  ----------------------------<  122  4.2   |  25  |  0.56    Ca    8.7      20 May 2021 05:00    TPro  6.3  /  Alb  3.0  /  TBili  0.4  /  DBili  x   /  AST  13  /  ALT  22  /  AlkPhos  271  05-20     eGFR if Non African American: 96 mL/min/1.73M2 (05-20-21 @ 05:00)  eGFR if : 112 mL/min/1.73M2 (05-20-21 @ 05:00)                             COVID-19 PCR: NotDetec (05-16-21 @ 05:00)  COVID-19 PCR: NotDetec (05-10-21 @ 05:00)  COVID-19 PCR: NotDetec (05-04-21 @ 05:00)  COVID-19 PCR: NotDetec (04-27-21 @ 18:10)  COVID-19 PCR: NotDetec (04-26-21 @ 06:33)

## 2021-05-22 NOTE — PROGRESS NOTE ADULT - ASSESSMENT
66 yo woman with PMHx breast cancer s/p mastectomy, HTN, Hypothyroidism admitted to [facility] after hospital course at [facility] after a fall from a flight of stairs, sustaining SAH, left-sided rib fractures, left iliac wing fracture with hematoma, left forehead laceration, s/p successful closed reduction of left pinky PIP dislocation, Acute L3 compression fracture, 4/29 admitted to [facility] unit    s/p fall at home w/ resultant SAH and multiple fxs, ecchymoses and cognitive impairment/ TBI  fall again at [facility]  rehab 5/17   - continue comprehensive rehabilitation program per PMR.   - continue safety monitoring   - FALL PRECAUTIONS  - pain meds per PMR team  - close monitoring for changes in exam     Urinary retention  Constipation  - straight cath/ PVR per protocol  - Urology following  - continue Flomax / urecholine  - will need Spangler when discharged if retention persists  - will give dulcolax x 1 now and otherwise continue bowel regimen    Normocytic Anemia with folic acid deficiency - stable  - monitor H/H    Elevated ALKPHOS  - 2/2 multiple fractures   - avoid hepatotoxins    Episodes of SVT   - Continue with toprol XL  100mg daily  - Can follow up in 3 months with EP Dr. Hernández [phone]. (elective ablation)    Hypothyroidism  - Continue Synthroid 112mcg daily    Cognitive impairment / TBI  Acute adjustment disorder with depressed mood    -  continue modafinil for cognitive stimulation    Insomnia  - melatonin     s/p Acute Hypoxic Respiratory Failure likely due to Acute B/L rib fractures, and small pneumothorax  - resolved  - Incentive spirometry.   - Supplemental Oxygen as needed for O2 goal > 92%    VTE PPX   - Lovenox      ATTESTATION:    I have personally interviewed and examined this patient, reviewed pertinent clinical information, and performed the evaluation and management services provided at today's visit for inpatient medical follow up    I am available to discuss any issues related to the medical care of this patient on the unit, or by phone at [phone]

## 2021-05-22 NOTE — PROGRESS NOTE ADULT - ASSESSMENT
JONATHAN CHILD is a 65y with PMHx breast Ca S/P mastectomy, HTN, hypothyroidism who presented to [facility] 4/19/21 s/p fall down a flight of stairs with bilateral frontoparietal SAH; L nasal bone and L orbital rim fractures; sternal fracture; L 3-8, R 7th rib fractures;  L pneumothorax, L iliac wing fracture with hematoma S/P 1U pRBCs; left clavicle fx, left V finger PIP dislocation s/p CR. She is WBAT Left LE, NWB Left UE.     # tSAH  - Avoid NSAIDs  - F/u neurosurgery, Dr. Vikas Aquino, upon discharge  - Cont comprehensive rehab program, PT/OT/SLP 3 hours a day, 5 days a week.  - Precautions: falls, sternal , WB as above, AMS, cardiac, h/o breast CA    #RRT S/P Fall 5/17/21 with head strike and lacerations left lateral brow and forehead  - CTH, CT cervical, CT C/A/P no acute findings 5/17.  - Plastics consulted and appreciated 5/17, and forehead laceration x2 sutured. Local incision care with bacitracin. Suture removal in 5-8 days.  - Repeat Head CT 12 hours post on 5/18 unremarkable  - Lovenox restarted 5/19. Hgb 12.4 --> 11.6 5/20--> 11.5 5/21 stable  - D/C neuro checks q 8 hours, patient neurologically stable and on 2:1 observation  - continue 2:1 supervision    # Multiple fractures. WBAT LLE, NWB LUE   - cleared for ROM exercises for L elbow, wrist, hand.  L shoulder pendulum exercises. Sling to LUE for comfort. No lifting more than 1-3 pounds for 6 weeks.  - LSO brace whenever OOB.  LSO replaced 5/14  -  delfin tape dc'd for L 5th finger dislocation (per Dr. Choe 5/10)  - Continue Lovenox for 6 weeks total (through 6/3)  - pain management as below  - follow up:  ·	plastic surgeon Dr. Nell Choe, for facial laceration, L pinky dislocation, L nasal bone and orbital rim fractures.   ·	ortho, Dr. Reji Cardoza, for L clavicle and L pelvic wing fractures  ·	surgery, Dr. Dino Peña, for rib fractures    # Acute Hypoxic Respiratory Failure likely due to Acute B/L rib fractures, and small pneumothorax  - Incentive spirometry. Breathing exercises  - Supplemental Oxygen PRN to keep O2 sats > 92%  - O2 sat 94-95% 5/22    # Elevated Alk Phos likely 2/2 trauma, bone fractures  - Hepatic sono 5/7: Multiple gallstones, without evidence for gallbladder wall thickening or pericholecystic fluid.  - 5/7 GGT 37, AST  13  /  ALT  22  /  AlkPhos  271<H>  05-20 improving  - CMP 5/24    # H/o SVT  - Continue with toprol XL   - Can follow up in 3 months with EP Dr. Hernández [phone]. (elective ablation)    #R kidney lesion  - F/u Dr. Herrera outpatient or PCP    #Hypothyroidism  - Continue Synthroid 112mcg daily    # Depression: stable/improved  - Psychiatry f/u 5/11 read and appreciated:   ·	Continue Lexapro 10mg  ·	Continue Modafinil 100mg  ·	Switch Ramelteon 8mg to Trazodone 25mg QHS due to persistent insomnia 5/21    # Pain  - Tylenol  - Lidoderm to L chest wall  - Scheduled Oxycodone 10mg at 8am before therapy to help improve pain control    # GI/Bowel:  - Senna QHS, Miralax Daily    # /Bladder:   - Avila placed 4/29/21 for urinary retention. Failed TOV 5/6   - Urine Cx: E. coli: started on Nitrofurantoin, switched to CTX for 3 day course, finished 5/18  - UA 5/17 negative  -  following  ·	Continue Bethanechol 10 bid  ·	Continue Flomax 0.4 mg daily  ·	Avila ordered 5/18 due to discomfort, continued straight caths, worsening PVR, restlessness leading to increased fall risk. Will likely need avila on dc per . Renewed 5/21    # Diet  - regular solids thin liquids  - 1:1 supervision for meals    # DVT ppx:  - Lovenox  - SCDs      #LABS  avila catheter  2:1 observation  CBC BMP 5/24  monitor sleep on trazodone    ---------------  Outpatient Follow-up (Specialty/Name of physician):  Silvina Herrera  INTERNAL MEDICINE  [address] [place] [address]  Phone: [phone]  Fax: [phone]  Follow Up Time: 1 week    Vikas Aquino)  Neurosurgery  [address] [facility], [address] [place] [address]  Phone: [phone]  Fax: [phone]  Follow Up Time: 2 weeks    Nell Choe)  Plastic Surgery  [address] [facility], [address]  [place], [place] [id number]  Phone: [phone]  Fax: [phone]  Follow Up Time: 1 week    Reji Cardoza)  Orthopaedic Surgery  [address] [facility], [address]  [place] [address]  Phone: [phone]  Fax: [phone]  Follow Up Time: Routine    Dino Peña)  Surgery; Surgical Critical Care  [address] [facility], [facility] [address], [place] [address]  Phone: [phone]  Fax: [phone]  Follow Up Time: Routine    Moragn Hernández; PhD)  Cardiac Electrophysiology; Cardiovascular Disease; Internal Medicine  [facility] - Dept of Cardiology, [address] [place] [address]  Phone: [phone]  Fax: [phone]

## 2021-05-23 PROCEDURE — 99232 SBSQ HOSP IP/OBS MODERATE 35: CPT

## 2021-05-23 RX ADMIN — LIDOCAINE 1 PATCH: 4 CREAM TOPICAL at 19:41

## 2021-05-23 RX ADMIN — Medication 10 MILLIGRAM(S): at 17:04

## 2021-05-23 RX ADMIN — Medication 112 MICROGRAM(S): at 07:00

## 2021-05-23 RX ADMIN — Medication 250 MILLIGRAM(S): at 17:05

## 2021-05-23 RX ADMIN — POLYETHYLENE GLYCOL 3350 17 GRAM(S): 17 POWDER, FOR SOLUTION ORAL at 11:17

## 2021-05-23 RX ADMIN — LIDOCAINE 1 PATCH: 4 CREAM TOPICAL at 22:46

## 2021-05-23 RX ADMIN — Medication 250 MILLIGRAM(S): at 06:39

## 2021-05-23 RX ADMIN — Medication 1 MILLIGRAM(S): at 11:17

## 2021-05-23 RX ADMIN — TAMSULOSIN HYDROCHLORIDE 0.4 MILLIGRAM(S): 0.4 CAPSULE ORAL at 21:41

## 2021-05-23 RX ADMIN — OXYCODONE HYDROCHLORIDE 10 MILLIGRAM(S): 5 TABLET ORAL at 08:12

## 2021-05-23 RX ADMIN — ZINC OXIDE 1 APPLICATION(S): 200 OINTMENT TOPICAL at 06:40

## 2021-05-23 RX ADMIN — MODAFINIL 100 MILLIGRAM(S): 200 TABLET ORAL at 06:39

## 2021-05-23 RX ADMIN — NYSTATIN CREAM 1 APPLICATION(S): 100000 CREAM TOPICAL at 06:40

## 2021-05-23 RX ADMIN — ZINC OXIDE 1 APPLICATION(S): 200 OINTMENT TOPICAL at 17:05

## 2021-05-23 RX ADMIN — Medication 1 TABLET(S): at 11:16

## 2021-05-23 RX ADMIN — Medication 25 MILLIGRAM(S): at 21:41

## 2021-05-23 RX ADMIN — ESCITALOPRAM OXALATE 10 MILLIGRAM(S): 10 TABLET, FILM COATED ORAL at 11:17

## 2021-05-23 RX ADMIN — LIDOCAINE 1 PATCH: 4 CREAM TOPICAL at 11:16

## 2021-05-23 RX ADMIN — Medication 1 APPLICATION(S): at 11:16

## 2021-05-23 RX ADMIN — SENNA PLUS 2 TABLET(S): 8.6 TABLET ORAL at 21:41

## 2021-05-23 RX ADMIN — OXYCODONE HYDROCHLORIDE 10 MILLIGRAM(S): 5 TABLET ORAL at 09:00

## 2021-05-23 RX ADMIN — Medication 10 MILLIGRAM(S): at 06:39

## 2021-05-23 RX ADMIN — LIDOCAINE 1 PATCH: 4 CREAM TOPICAL at 00:49

## 2021-05-23 RX ADMIN — ENOXAPARIN SODIUM 40 MILLIGRAM(S): 100 INJECTION SUBCUTANEOUS at 06:41

## 2021-05-23 RX ADMIN — NYSTATIN CREAM 1 APPLICATION(S): 100000 CREAM TOPICAL at 17:05

## 2021-05-23 NOTE — BH CONSULTATION LIAISON PROGRESS NOTE - NSBHFUPINTERVALHXFT_PSY_A_CORE
JONATHAN CHILD is a 65y with PMHx breast Ca S/P mastectomy, HTN, hypothyroidism presented to [facility] 4/19/21 as level 2 trauma after falling down a flight of stairs. She was found to have bilateral frontoparietal SAH, L 3-8 and R 7th rib fractures with occult L pneumothorax, sternal fracture, L iliac wing fracture with hematoma S/P 1U pRBCs--WBAT Left LE, L forehead laceration S/P suture repair, L pinky PIP dislocation S/P closed reduction with delfin splint, L clavicle fracture-- NWB Left UE, L nasal bone and L orbital rim fractures, and sternal fracture. No surgical intervention.  Admitted to rehab with cognitive deficits, gait and ADL impairment.      Psychiatry C/L Assessment:  Patient was seen and evaluated by psych for possible depression, flat affect, low energy and appetite per . Patient responded in few words to most questions, mostly responded with "okay." She describes mood as "okay", she says she is eating "okay." She said "I think I am 100%..." but was unable to complete the thought. She denies any feelings of sadness, depressed mood, anxiety, hallucination. Pt is however having difficulty staying asleep, estimates ~5hrs each night. She also admits to having difficulty with memory and concentration. Patient denies history of depression, anxiety or other psychiatric illness. She denies family history of mental illness.   On exam patient presents with flat affect, slowed, monotone speech and poor productivity. She has a tendency to trail off when attempting to speak full sentences and look away. She is oriented to person, situation ("I fell down the stairs"), date/time. Oriented to  but wrong hospital ("[facility]"). She recalls the last 2 presidents correctly. She had difficulty with delayed memory recall (0/3), difficulty with concentration (1/5 serial 7's). Registration, repetition and naming intact. Can follow directions. She said she was unable to perform written part of exam.  Patient recalls that she lives in [place] with  and son. She has been  "30-something years...since 1987," and has 2 sons. She recalls that she is retired, and she said she thinks she was a  before that but is unsure. She says she went to college at [employer]. She denies, smoking, drinking, illicit drug use. She practices Tenriism.   Attempted to contact  Silverio ([phone]) and Son Henok ([phone]) but without answer.    Psychiatry Interval Note (5/11)  Patient complained of difficulty staying asleep at night. Patient otherwise described eating "okay", denied depressed or anxious mood, denied feelings of sadness, hopelessness, or worried thoughts. She denied difficulty concentrating or issues with memory. Patient still responding to most questions with one word answers (i.e. "okay"). On exam pt had flat affect, made no eye contact with interviewer, exhibited monotone speech with latency, slowed rate and decreased productivity. Patient A&Ox3, repetition & registration 3/3, attention/calculation 2/5, delayed recall 0/3, can follow directions.
JONATHAN CHILD is a 65y with PMHx breast Ca S/P mastectomy, HTN, hypothyroidism presented to [facility] 4/19/21 as level 2 trauma after falling down a flight of stairs. She was found to have bilateral frontoparietal SAH, L 3-8 and R 7th rib fractures with occult L pneumothorax, sternal fracture, L iliac wing fracture with hematoma S/P 1U pRBCs--WBAT Left LE, L forehead laceration S/P suture repair, L pinky PIP dislocation S/P closed reduction with delfin splint, L clavicle fracture-- NWB Left UE, L nasal bone and L orbital rim fractures, and sternal fracture. No surgical intervention.  Admitted to rehab with cognitive deficits, gait and ADL impairment.      Patient was seen and evaluated by psych for  depression, flat affect, low energy . Patient responded in few words to most questions, mostly responded with "okay." She describes mood as "okay", she says she is eating "okay." She said "I think I am 100%..." but was unable to complete the thought. She denies any feelings of sadness, depressed mood, anxiety, hallucination. Pt is however having difficulty staying asleep, estimates ~5hrs each night. She also admits to having difficulty with memory and concentration. Patient denies history of depression, anxiety or other psychiatric illness. She denies family history of mental illness.   On exam patient presents with flat affect, slowed, monotone speech and poor productivity. She has a tendency to trail off when attempting to speak full sentences and look away. She is oriented to person, situation ("I fell down the stairs"), date/time. Oriented to  but [facility] hospital ("[facility]"). She recalls the last 2 presidents correctly. She had difficulty with delayed memory recall (0/3), difficulty with concentration (1/5 serial 7's). Registration, repetition and naming intact. Can follow directions. She said she was unable to perform written part of exam.      Psychiatry Interval Note (5/23/21)  Patient complained of difficulty staying asleep at night. Patient otherwise described eating "okay", denied depressed or anxious mood, denied feelings of sadness, hopelessness, or worried thoughts. She denied difficulty concentrating or issues with memory. Patient still responding to most questions with one word answers (i.e. "okay"). On exam pt had flat affect, made no eye contact with interviewer, exhibited monotone speech with latency, slowed rate and decreased productivity. Patient A&Ox3, repetition & registration 3/3, attention/calculation 2/5, delayed recall 0/3, can follow directions.

## 2021-05-23 NOTE — PROGRESS NOTE ADULT - ASSESSMENT
POD6 repair laceration forehead sutures removed and steri strips placed  -I discussed with nursing that daily saline cleanse and bacitracin ointment is to be discontinued  -steri strips are to be kept clean and dry and not to be removed before 6/2/21.  -I will not be available 5/34 -6/2/21, please call plastic surgery on call for any emergency.

## 2021-05-23 NOTE — PROGRESS NOTE ADULT - ASSESSMENT
JONATHAN CHILD is a 65y with PMHx breast Ca S/P mastectomy, HTN, hypothyroidism who presented to [facility] 4/19/21 s/p fall down a flight of stairs with bilateral frontoparietal SAH; L nasal bone and L orbital rim fractures; sternal fracture; L 3-8, R 7th rib fractures;  L pneumothorax, L iliac wing fracture with hematoma S/P 1U pRBCs; left clavicle fx, left V finger PIP dislocation s/p CR. She is WBAT Left LE, NWB Left UE.     # tSAH  - Avoid NSAIDs  - F/u neurosurgery, Dr. Vikas Aquino, upon discharge  - Cont comprehensive rehab program, PT/OT/SLP 3 hours a day, 5 days a week.  - Precautions: falls, sternal , WB as above, AMS, cardiac, h/o breast CA    #RRT S/P Fall 5/17/21 with head strike and lacerations left lateral brow and forehead  - CTH, CT cervical, CT C/A/P no acute findings 5/17.  - Plastics consulted and appreciated 5/17, and forehead laceration x2 sutured. Local incision care with bacitracin. Suture removal in 5-8 days.  - Repeat Head CT 12 hours post on 5/18 unremarkable  - Lovenox restarted 5/19. Hgb 12.4 --> 11.6 5/20--> 11.5 5/21 stable  - D/C neuro checks q 8 hours, patient neurologically stable and on 2:1 observation  - continue 2:1 supervision    # Multiple fractures. WBAT LLE, NWB LUE   - cleared for ROM exercises for L elbow, wrist, hand.  L shoulder pendulum exercises. Sling to LUE for comfort. No lifting more than 1-3 pounds for 6 weeks.  - LSO brace whenever OOB.  LSO replaced 5/14  -  delfin tape dc'd for L 5th finger dislocation (per Dr. Choe 5/10)  - Continue Lovenox for 6 weeks total (through 6/3)  - pain management as below  - follow up:  ·	plastic surgeon Dr. Nell Choe, for facial laceration, L pinky dislocation, L nasal bone and orbital rim fractures.   ·	ortho, Dr. Reji Cardoza, for L clavicle and L pelvic wing fractures  ·	surgery, Dr. Dino Peña, for rib fractures    # Acute Hypoxic Respiratory Failure likely due to Acute B/L rib fractures, and small pneumothorax  - Incentive spirometry. Breathing exercises  - Supplemental Oxygen PRN to keep O2 sats > 92%  - O2 sat 92-94% 5/23    # Elevated Alk Phos likely 2/2 trauma, bone fractures  - Hepatic sono 5/7: Multiple gallstones, without evidence for gallbladder wall thickening or pericholecystic fluid.  - 5/7 GGT 37, AST  13  /  ALT  22  /  AlkPhos  271<H>  05-20 improving  - CMP 5/24    # H/o SVT  - Continue with toprol XL   - Can follow up in 3 months with EP Dr. Hernández [phone]. (elective ablation)    #R kidney lesion  - F/u Dr. Herrera outpatient or PCP    #Hypothyroidism  - Continue Synthroid 112mcg daily    # Depression: stable/improved  - Psychiatry f/u 5/11 read and appreciated:   ·	Continue Lexapro 10mg  ·	Continue Modafinil 100mg  ·	Switch Ramelteon 8mg to Trazodone 25mg QHS due to persistent insomnia 5/21    # Pain  - Tylenol  - Lidoderm to L chest wall  - Scheduled Oxycodone 10mg at 8am before therapy to help improve pain control    # GI/Bowel:  - Senna QHS, Miralax Daily    # /Bladder:   - Avila placed 4/29/21 for urinary retention. Failed TOV 5/6   - Urine Cx: E. coli: started on Nitrofurantoin, switched to CTX for 3 day course, finished 5/18  - UA 5/17 negative  -  following  ·	Continue Bethanechol 10 bid  ·	Continue Flomax 0.4 mg daily  ·	Avila ordered 5/18 due to discomfort, continued straight caths, worsening PVR, restlessness leading to increased fall risk. Will likely need avila on dc per .     # Diet  - regular solids thin liquids  - 1:1 supervision for meals    # DVT ppx:  - Lovenox  - SCDs      #LABS  avila catheter  2:1 observation  CBC BMP 5/24  monitor sleep on trazodone    ---------------  Outpatient Follow-up (Specialty/Name of physician):  Silvina Herrera  INTERNAL MEDICINE  [address] [place] [address]  Phone: [phone]  Fax: [phone]  Follow Up Time: 1 week    Vikas Aquino)  Neurosurgery  [address] [facility], [address] [place] [address]  Phone: [phone]  Fax: [phone]  Follow Up Time: 2 weeks    Nell Choe)  Plastic Surgery  [address] [facility], [address]  [place], [place] [address]  Phone: [phone]  Fax: [phone]  Follow Up Time: 1 week    Reji Cardoza)  Orthopaedic Surgery  [address] [facility], [address]  [place] [address]  Phone: [phone]  Fax: [phone]  Follow Up Time: Routine    Dino Peña)  Surgery; Surgical Critical Care  [address] [facility], [address], [place] [address]  Phone: [phone]  Fax: [phone]  Follow Up Time: Routine    Morgan Hernández; PhD)  Cardiac Electrophysiology; Cardiovascular Disease; Internal Medicine  [facility] - Dept of Cardiology, [address] [place] [address]  Phone: [phone]  Fax: [phone]

## 2021-05-23 NOTE — BH CONSULTATION LIAISON PROGRESS NOTE - NSBHCHARTREVIEWLAB_PSY_A_CORE FT
05-10    140  |  106  |  19  ----------------------------<  112<H>  4.1   |  25  |  0.56    Ca    8.5      10 May 2021 05:30    TPro  6.2  /  Alb  2.9<L>  /  TBili  0.4  /  DBili  x   /  AST  13  /  ALT  17  /  AlkPhos  423<H>  05-10                          10.8   7.14  )-----------( 277      ( 10 May 2021 05:30 )             32.6   
05-10    140  |  106  |  19  ----------------------------<  112<H>  4.1   |  25  |  0.56    Ca    8.5      10 May 2021 05:30    TPro  6.2  /  Alb  2.9<L>  /  TBili  0.4  /  DBili  x   /  AST  13  /  ALT  17  /  AlkPhos  423<H>  05-10                          10.8   7.14  )-----------( 277      ( 10 May 2021 05:30 )             32.6

## 2021-05-23 NOTE — CHART NOTE - NSCHARTNOTEFT_GEN_A_CORE
Nutrition Follow Up Note  Hospital Course (Per Electronic Medical Record): 65y with PMHx breast Ca S/P mastectomy, HTN, hypothyroidism who presented to [facility] 4/19/21 s/p fall down a flight of stairs with bilateral frontoparietal SAH; L nasal bone and L orbital rim fractures; sternal fracture; L 3-8, R 7th rib fractures;  L pneumothorax, L iliac wing fracture with hematoma S/P 1U pRBCs; left clavicle fx, left V finger PIP dislocation s/p CR. She is WBAT Left LE, NWB Left UE.   Source: Medical Record [X] Patient [X] Family [ ]         Diet: Regular, Kosher, Ensure Enlive BID  Pt with variable PO intake of current diet. Pt confused when asked about her appetite/PO intake. Reports "Yes" "No" and repeats phrases.     Current Weight:   125.8 lbs (5/23)  130 lbs (5/15)  130.2 lbs (5/14)  131.8 lbs (5/9)  135.1 lbs (4/30)  133.1 lbs (4/28)  Noted with 7.3#/5% unintentional weight loss from 4/28 weight.     Pertinent Medications: MEDICATIONS  (STANDING):  BACItracin   Ointment 1 Application(s) Topical daily  bethanechol 10 milliGRAM(s) Oral two times a day  enoxaparin Injectable 40 milliGRAM(s) SubCutaneous every 24 hours  escitalopram 10 milliGRAM(s) Oral daily  folic acid 1 milliGRAM(s) Oral daily  levothyroxine 112 MICROGram(s) Oral daily  lidocaine   Patch 1 Patch Transdermal daily  metoprolol succinate  milliGRAM(s) Oral daily  modafinil 100 milliGRAM(s) Oral <User Schedule>  multivitamin 1 Tablet(s) Oral daily  nystatin Powder 1 Application(s) Topical two times a day  oxyCODONE    IR 10 milliGRAM(s) Oral <User Schedule>  polyethylene glycol 3350 17 Gram(s) Oral daily  saccharomyces boulardii 250 milliGRAM(s) Oral two times a day  senna 2 Tablet(s) Oral at bedtime  tamsulosin 0.4 milliGRAM(s) Oral at bedtime  traZODone 25 milliGRAM(s) Oral at bedtime  zinc oxide 40% Ointment 1 Application(s) Topical two times a day    MEDICATIONS  (PRN):  acetaminophen   Tablet .. 650 milliGRAM(s) Oral every 6 hours PRN Temp greater or equal to 38C (100.4F), Mild Pain (1 - 3)      Pertinent Labs:   05-20 Alb 3.0 g/dL<L>        Skin:  bruised, mid-upper forehead surgical incision, L forehead surgical incision, IAD      Edema: none per nursing flow sheets    Last BM: on 5/19 per nursing flow sheets    Estimated Needs:   [X] No Change since Previous Assessment  [ ] Recalculated:     Previous Nutrition Diagnosis:   Inadequate oral intake    Nutrition Diagnosis is [X] Ongoing    [ ] Resolved   [ ] Not Applicable      New Nutrition Diagnosis: [ ] Not Applicable  [ ] Inadequate Protein Energy Intake   [ ] Inadequate Oral Intake   [ ] Excessive Energy Intake   [ ] Increased Nutrient Needs   [ ] Obesity   [ ] Altered GI Function   [ ] Unintended Weight Loss   [ ] Food & Nutrition Related Knowledge Deficit  [ ] Limited Adherence to nutrition related recommendations   [X] Malnutrition ; moderate in context of acute illness based on 7.3#/5% unintentional weight loss <1 month, mild-moderate loss of muscle (calves, temples), mild loss of subcutaneous fat.    Interventions:   1. Recommend continue with current diet  2. Trial an increase in Ensure Enlive supplement to TID, will also continue to honor food preferences as able  3. Pt requires assistance and encouragement during meals      Monitoring & Evaluation:   [X] Weights   [X] PO Intake   [X] Follow Up (Per Protocol)  [X] Tolerance to Diet Prescription   [X] Other: Labs     RD Remains Available.  Diana Sandhu RD

## 2021-05-23 NOTE — BH CONSULTATION LIAISON PROGRESS NOTE - NSBHATTESTSEENBY_PSY_A_CORE
attending Psychiatrist without NP/Trainee
NP with telephonic supervision from Attending Psychiatrist

## 2021-05-23 NOTE — BH CONSULTATION LIAISON PROGRESS NOTE - CURRENT MEDICATION
MEDICATIONS  (STANDING):  bethanechol 10 milliGRAM(s) Oral two times a day  enoxaparin Injectable 40 milliGRAM(s) SubCutaneous every 24 hours  escitalopram 10 milliGRAM(s) Oral daily  folic acid 1 milliGRAM(s) Oral daily  levothyroxine 112 MICROGram(s) Oral daily  lidocaine   Patch 1 Patch Transdermal daily  metoprolol succinate  milliGRAM(s) Oral daily  modafinil 100 milliGRAM(s) Oral <User Schedule>  multivitamin 1 Tablet(s) Oral daily  nystatin Powder 1 Application(s) Topical two times a day  oxyCODONE    IR 10 milliGRAM(s) Oral <User Schedule>  polyethylene glycol 3350 17 Gram(s) Oral daily  ramelteon 8 milliGRAM(s) Oral at bedtime  senna 2 Tablet(s) Oral at bedtime  zinc oxide 40% Ointment 1 Application(s) Topical two times a day    MEDICATIONS  (PRN):  acetaminophen   Tablet .. 650 milliGRAM(s) Oral every 6 hours PRN Temp greater or equal to 38C (100.4F), Mild Pain (1 - 3)  
MEDICATIONS  (STANDING):  bethanechol 10 milliGRAM(s) Oral two times a day  enoxaparin Injectable 40 milliGRAM(s) SubCutaneous every 24 hours  escitalopram 10 milliGRAM(s) Oral daily  folic acid 1 milliGRAM(s) Oral daily  levothyroxine 112 MICROGram(s) Oral daily  lidocaine   Patch 1 Patch Transdermal daily  metoprolol succinate  milliGRAM(s) Oral daily  modafinil 100 milliGRAM(s) Oral <User Schedule>  multivitamin 1 Tablet(s) Oral daily  nystatin Powder 1 Application(s) Topical two times a day  oxyCODONE    IR 10 milliGRAM(s) Oral <User Schedule>  polyethylene glycol 3350 17 Gram(s) Oral daily  saccharomyces boulardii 250 milliGRAM(s) Oral two times a day  senna 2 Tablet(s) Oral at bedtime  tamsulosin 0.4 milliGRAM(s) Oral at bedtime  traZODone 25 milliGRAM(s) Oral at bedtime  zinc oxide 40% Ointment 1 Application(s) Topical two times a day    MEDICATIONS  (PRN):  acetaminophen   Tablet .. 650 milliGRAM(s) Oral every 6 hours PRN Temp greater or equal to 38C (100.4F), Mild Pain (1 - 3)

## 2021-05-23 NOTE — BH CONSULTATION LIAISON PROGRESS NOTE - NSBHMSESPABN_PSY_A_CORE
Soft volume/Slowed rate/Decreased productivity/Increased latency
Soft volume/Slowed rate/Decreased productivity/Increased latency

## 2021-05-23 NOTE — PROGRESS NOTE ADULT - SUBJECTIVE AND OBJECTIVE BOX
Patient is a 65y old  Female who presents with a chief complaint of SAH and fractures of left-sided ribs, left iliac wing with hematoma, S/P closed reduction of left pinky PIP dislocation after trauma (22 May 2021 07:58)    Patient seen and examined at bedside.  There were no  acute medical issues reported and patient without complaints    ALLERGIES:  Tapazole (Hives)    MEDICATIONS  (STANDING):  BACItracin   Ointment 1 Application(s) Topical daily  bethanechol 10 milliGRAM(s) Oral two times a day  bisacodyl 5 milliGRAM(s) Oral once  enoxaparin Injectable 40 milliGRAM(s) SubCutaneous every 24 hours  escitalopram 10 milliGRAM(s) Oral daily  folic acid 1 milliGRAM(s) Oral daily  levothyroxine 112 MICROGram(s) Oral daily  lidocaine   Patch 1 Patch Transdermal daily  metoprolol succinate  milliGRAM(s) Oral daily  modafinil 100 milliGRAM(s) Oral <User Schedule>  multivitamin 1 Tablet(s) Oral daily  nystatin Powder 1 Application(s) Topical two times a day  oxyCODONE    IR 10 milliGRAM(s) Oral <User Schedule>  polyethylene glycol 3350 17 Gram(s) Oral daily  saccharomyces boulardii 250 milliGRAM(s) Oral two times a day  senna 2 Tablet(s) Oral at bedtime  tamsulosin 0.4 milliGRAM(s) Oral at bedtime  traZODone 25 milliGRAM(s) Oral at bedtime  zinc oxide 40% Ointment 1 Application(s) Topical two times a day    MEDICATIONS  (PRN):  acetaminophen   Tablet .. 650 milliGRAM(s) Oral every 6 hours PRN Temp greater or equal to 38C (100.4F), Mild Pain (1 - 3)    Vital Signs Last 24 Hrs  T(C): 36.9 (23 May 2021 08:33), Max: 36.9 (23 May 2021 08:33)  T(F): 98.4 (23 May 2021 08:33), Max: 98.4 (23 May 2021 08:33)  HR: 69 (23 May 2021 08:33) (63 - 69)  BP: 100/60 (23 May 2021 08:33) (100/60 - 108/68)  BP(mean): --  RR: 15 (23 May 2021 08:33) (14 - 15)  SpO2: 95% (23 May 2021 08:33) (94% - 95%)      PHYSICAL EXAM:  General: NAD, A/O x 3  ENT: MMM Ecchymoses resolving periorbital  Neck: Supple, No JVD  Lungs: Clear to auscultation bilaterally  Cardio: RRR, S1/S2, No murmurs  Abdomen: Soft, Nontender, Nondistended; Bowel sounds present  Extremities: No calf tenderness, No pitting edema    LABS:                        11.5   8.12  )-----------( 181      ( 21 May 2021 07:06 )             35.4       05-20    142  |  107  |  23  ----------------------------<  122  4.2   |  25  |  0.56    Ca    8.7      20 May 2021 05:00    TPro  6.3  /  Alb  3.0  /  TBili  0.4  /  DBili  x   /  AST  13  /  ALT  22  /  AlkPhos  271  05-20     eGFR if Non African American: 96 mL/min/1.73M2 (05-20-21 @ 05:00)  eGFR if : 112 mL/min/1.73M2 (05-20-21 @ 05:00)                             COVID-19 PCR: NotDetec (05-16-21 @ 05:00)  COVID-19 PCR: NotDetec (05-10-21 @ 05:00)  COVID-19 PCR: NotDetec (05-04-21 @ 05:00)  COVID-19 PCR: NotDetec (04-27-21 @ 18:10)  COVID-19 PCR: NotDetec (04-26-21 @ 06:33)

## 2021-05-23 NOTE — BH CONSULTATION LIAISON PROGRESS NOTE - NSBHASSESSMENTFT_PSY_ALL_CORE
Patient informed letter ready for .   Anyi Waddell, 03/18/19, 4:48 PM
JONATHAN CHILD is a 65y with PMHx breast Ca S/P mastectomy, HTN, hypothyroidism presented to [facility] 4/19/21 as level 2 trauma after falling down a flight of stairs. She was found to have bilateral frontoparietal SAH, L 3-8 and R 7th rib fractures with occult L pneumothorax, sternal fracture, L iliac wing fracture with hematoma S/P 1U pRBCs--WBAT Left LE, L forehead laceration S/P suture repair, L pinky PIP dislocation S/P closed reduction with delfin splint, L clavicle fracture-- NWB Left UE, L nasal bone and L orbital rim fractures, and sternal fracture. No surgical intervention.  Admitted to rehab with cognitive deficits, gait and ADL impairment.    Patient seen and evaluated by psych, denied mood or appetite disturbances but admitted to difficulty with sleep. Patient presenting with flat affect and cognitive slowing s/p TBI. Patient started on Lexapro 10mg, Modafinil 100mg, failed melatonin now on Ramelteon 8mg QHS for sleep. Seen 5/11 with cognitive deficits & c/o sleep disturbance.
JONATHAN CHILD is a 65y with PMHx breast Ca S/P mastectomy, HTN, hypothyroidism presented to [facility] 4/19/21 as level 2 trauma after falling down a flight of stairs. She was found to have bilateral frontoparietal SAH, L 3-8 and R 7th rib fractures with occult L pneumothorax, sternal fracture, L iliac wing fracture with hematoma S/P 1U pRBCs--WBAT Left LE, L forehead laceration S/P suture repair, L pinky PIP dislocation S/P closed reduction with delfin splint, L clavicle fracture-- NWB Left UE, L nasal bone and L orbital rim fractures, and sternal fracture. No surgical intervention.  Admitted to rehab with cognitive deficits, gait and ADL impairment.    Patient seen and evaluated by psych, denied mood or appetite disturbances but admitted to difficulty with sleep. Patient presenting with flat affect and cognitive slowing s/p TBI. Patient started on Lexapro 10mg, Modafinil 100mg, failed melatonin now on Ramelteon 8mg QHS for sleep. Seen 5/11 with cognitive deficits & c/o sleep disturbance.

## 2021-05-23 NOTE — PROGRESS NOTE ADULT - SUBJECTIVE AND OBJECTIVE BOX
Patient is a 65y old  Female who presents with a chief complaint of SAH and fractures of left-sided ribs, left iliac wing with hematoma, S/P closed reduction of left pinky PIP dislocation after trauma (19 May 2021 11:22)      HPI:  65F PMHx breast cancer s/p mastectomy, HTN, hypothyroidism, not on full anticoagulation/antiplatelets per EMS, presented to [facility] 4/19/21as a level 2 trauma activation after a fall from a flight of stairs. Patient only able to say "ow" in trauma bay, unable to provide further history. However per son, normal mentation at baseline. In the trauma bay, airway was intact with bilateral breath sounds, O2 saturation 100% on room air. Initially blood pressure was 140s systolic however on repeat 90s. NS@100 initiated. Secondary significant for left forehead laceration, left chest wall tenderness, left pelvic tenderness.     Ms. Arthur was hypotensive in the trauma bay and was transferred to the Surgical ICU after imaging was obtained. Her injury list is below.  (1) SAH, bilateral: stable on repeat imaging. Seven day course of Keppra for post-traumatic seizure prophylaxis.  (2) left 3-8 and right 7th rib fractures with occult left pneumothorax: pain control  (3) left iliac wing fracture with hematoma: required transfusion early in her ICU course, after which CBC were stable  (4) left forehead laceration s/p suture repair  (5) s/p successful closed reduction of left pinky PIP dislocation with delfin splint  (6) left clavicle fracture  (7) left nasal bone and left orbital rim fractures  (8) sternal fracture  Incidental findings: right renal lesion. Pt's  was notified and copy of the report given to him.     The patient was admitted to Trauma Surgery under SICU care.  Ortho was consulted for L. iliac wing fx, sternal fx, L. clavicle fx- recommended WBAT of the affected left lower extremity with walker, WBAT L shoulder, pendulum exercises okay, no lifting, sling for comfort, Encourage ROM of elbow/wrist/hand, PT/OT consult, NO urgent orthopedic surgical intervention at this time.    Neurosurgery consulted- recommended repeat CTH, which was stable.     PRS was consulted for non displaced left superior orbital rim and non displaced left nasal bone fracture--> No indication for operative fixation of these non displaced fractures; recommend ice to face to aid in swelling and analgesia.    She was placed on hemorrhage watch in the SICU given pelvic fracture and surrounding hematoma.  Behavior health was consulted for delirium.    Hand was consulted for L. pinky PIP dislocation- s/p closed reduction, rec Cont delfin taping for now.    4/21- CTH stable  - Added home lexapro  - Added NS @ 50cc/hr to supplement poor PO intake  - Lactate cleared (1.9)    4/22- Transfused 1u pRBC, Hct 20.9 -> 26.9  - Went into SVT, given adenosine x1 and started on metoprolol 25mg q12hr  - Added oxycodone 5mg q4hrs prn for increased pain control  - PO intake improved, IVL    4/23- EP was consulted for SVT, recc continue lopressor 25mg   Metoprolol PO increased from 25mg q12hrs to 25mg q6hrs.   - Added salt tabs 1g q8hrs and started on NS @ 50cc/hr for hyponatremia  - Avila discontinued, passed TOV  - Added lovenox  - Cervical collar cleared by confrontational exam     4/24- Patient transferred from SICU to surgical floor in stable condition.  Discharge planning to acute rehab.     Stable from neurologic perspective consistent with mild TBI  Multiple rib fractures - breathing comfortable with multimodal pain control    On day of discharge, the patient was tolerating diet, working with PT well and pain controlled. The patient was medically stable for discharge to acute rehab with instructions for outpatient follow up 4/27/21. 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    C Section    Abnormality, eye  h/o left eye vitrectomy    S/P D&amp;C (status post dilation and curettage)    H/O left mastectomy    Allergies    Tapazole (Hives)    Intolerances      MEDICATIONS  (STANDING):  BACItracin   Ointment 1 Application(s) Topical daily  bethanechol 10 milliGRAM(s) Oral two times a day  enoxaparin Injectable 40 milliGRAM(s) SubCutaneous every 24 hours  escitalopram 10 milliGRAM(s) Oral daily  folic acid 1 milliGRAM(s) Oral daily  levothyroxine 112 MICROGram(s) Oral daily  lidocaine   Patch 1 Patch Transdermal daily  metoprolol succinate  milliGRAM(s) Oral daily  modafinil 100 milliGRAM(s) Oral <User Schedule>  multivitamin 1 Tablet(s) Oral daily  nystatin Powder 1 Application(s) Topical two times a day  oxyCODONE    IR 10 milliGRAM(s) Oral <User Schedule>  polyethylene glycol 3350 17 Gram(s) Oral daily  saccharomyces boulardii 250 milliGRAM(s) Oral two times a day  senna 2 Tablet(s) Oral at bedtime  tamsulosin 0.4 milliGRAM(s) Oral at bedtime  traZODone 25 milliGRAM(s) Oral at bedtime  zinc oxide 40% Ointment 1 Application(s) Topical two times a day    MEDICATIONS  (PRN):  acetaminophen   Tablet .. 650 milliGRAM(s) Oral every 6 hours PRN Temp greater or equal to 38C (100.4F), Mild Pain (1 - 3)    ROS/Subjective:  Patient seen this morning at bedside. Patient in bed in NAD, no SOB, no n/v, no pain. (+)avila      Vital Signs Last 24 Hrs  T(C): 36.7 (22 May 2021 20:45), Max: 36.7 (22 May 2021 09:06)  T(F): 98.1 (22 May 2021 20:45), Max: 98.1 (22 May 2021 09:06)  HR: 63 (23 May 2021 06:37) (63 - 66)  BP: 102/67 (23 May 2021 06:37) (102/67 - 108/68)  BP(mean): --  RR: 14 (22 May 2021 20:45) (14 - 14)  SpO2: 94% (22 May 2021 20:45) (92% - 94%)    RECENT LABS           LABS:                 11.5   8.12  )-----------( 181      ( 21 May 2021 07:06 )             35.4     05-20    142  |  107  |  23  ----------------------------<  122<H>  4.2   |  25  |  0.56    Ca    8.7      20 May 2021 05:00    TPro  6.3  /  Alb  3.0<L>  /  TBili  0.4  /  DBili  x   /  AST  13  /  ALT  22  /  AlkPhos  271<H>  05-20            Physical Exam:   · Constitutional	detailed exam  · Constitutional Details	no distress  · Constitutional Comments	 +periorbital ecchymoses improving. Swelling sig improved NAD +aphasic    ·	HEENT	  EOMI, conjunctiva clear. (+)left frontal sutures, no erythema, no d/c  	  · Respiratory	detailed exam  · Respiratory Details	breath sounds equal; respirations non-labored; reduced BS bases  · Cardiovascular	detailed exam  · Cardiovascular Details	regular rate and rhythm  · Gastrointestinal	detailed exam  · GI Normal	nontender; no distention; bowel sounds normal  	  · Extremities	 bialteral calves soft, NT no erythema or warmth  	  Motor: no focal deficits

## 2021-05-23 NOTE — PROGRESS NOTE ADULT - SUBJECTIVE AND OBJECTIVE BOX
(x ) No complaints (  ) Complains of:     T(F): 98.4 (05-23-21 @ 08:33), Max: 98.4 (05-23-21 @ 08:33)  HR: 69 (05-23-21 @ 08:33) (63 - 69)  BP: 100/60 (05-23-21 @ 08:33) (100/60 - 108/68)  RR: 15 (05-23-21 @ 08:33) (14 - 15)  SpO2: 95% (05-23-21 @ 08:33) (94% - 95%)        Wound Location 1:  mid forehead retention sutures removed with partial dehiscence manage adequately with steri strip application.  There was no bleeding or hematoma from wound       Wound location 2:  lateral left forehead sutures removed with intact  repair, steri strips placed.  No signs of infection.         Wound location:  resolving ecchymosis of face                                  Procedure Performed:  (  )Yes     ( x )No  Name of Procedure:  (  )debridement    (  )I&D    (  )Other:  (  )partial thickness     (  )full thickness     (  )subcutaneous     (  )muscle/tendon     (  )bone  (  )sharp     (  )surgical

## 2021-05-23 NOTE — PROGRESS NOTE ADULT - ASSESSMENT
64 yo woman with PMHx breast cancer s/p mastectomy, HTN, Hypothyroidism admitted to [facility] after hospital course at [facility] after a fall from a flight of stairs, sustaining SAH, left-sided rib fractures, left iliac wing fracture with hematoma, left forehead laceration, s/p successful closed reduction of left pinky PIP dislocation, Acute L3 compression fracture, 4/29 admitted to [facility] unit    s/p fall at home w/ resultant SAH and multiple fxs, ecchymoses and cognitive impairment/ TBI  fall again at [facility]  rehab 5/17   - continue comprehensive rehabilitation program per PMR.   - continue safety monitoring   - FALL PRECAUTIONS  - pain meds per PMR team  - close monitoring for changes in exam     Urinary retention  Constipation  - straight cath/ PVR per protocol  - Urology following  - continue Flomax / urecholine  - will need Spangler when discharged if retention persists  - continue bowel regimen    Normocytic Anemia with folic acid deficiency - stable  - monitor H/H    Elevated ALKPHOS  - 2/2 multiple fractures   - avoid hepatotoxins    Episodes of SVT   - Continue with toprol XL  100mg daily  - Can follow up in 3 months with EP Dr. Hernández [phone]. (elective ablation)    Hypothyroidism  - Continue Synthroid 112mcg daily    Cognitive impairment / TBI  Acute adjustment disorder with depressed mood    -  continue modafinil for cognitive stimulation    Insomnia  - melatonin     s/p Acute Hypoxic Respiratory Failure likely due to Acute B/L rib fractures, and small pneumothorax  - resolved  - Incentive spirometry.   - Supplemental Oxygen as needed for O2 goal > 92%    VTE PPX   - Lovenox      ATTESTATION:    I have personally interviewed and examined this patient, reviewed pertinent clinical information, and performed the evaluation and management services provided at today's visit for inpatient medical follow up    I am available to discuss any issues related to the medical care of this patient on the unit, or by phone at [phone]

## 2021-05-23 NOTE — BH CONSULTATION LIAISON PROGRESS NOTE - NSBHCHARTREVIEWVS_PSY_A_CORE FT
Vital Signs Last 24 Hrs  T(C): 36.9 (23 May 2021 08:33), Max: 36.9 (23 May 2021 08:33)  T(F): 98.4 (23 May 2021 08:33), Max: 98.4 (23 May 2021 08:33)  HR: 69 (23 May 2021 08:33) (63 - 69)  BP: 100/60 (23 May 2021 08:33) (100/60 - 108/68)  BP(mean): --  RR: 15 (23 May 2021 08:33) (14 - 15)  SpO2: 95% (23 May 2021 08:33) (94% - 95%)
Vital Signs Last 24 Hrs  T(C): 36.9 (11 May 2021 08:05), Max: 36.9 (11 May 2021 08:05)  T(F): 98.4 (11 May 2021 08:05), Max: 98.4 (11 May 2021 08:05)  HR: 61 (11 May 2021 08:05) (61 - 80)  BP: 112/73 (11 May 2021 08:05) (111/69 - 131/82)  BP(mean): --  RR: 14 (11 May 2021 08:05) (14 - 14)  SpO2: 96% (11 May 2021 08:05) (96% - 97%)

## 2021-05-23 NOTE — BH CONSULTATION LIAISON PROGRESS NOTE - NSBHCHARTREVIEWINVESTIGATE_PSY_A_CORE FT
Ventricular Rate 68 BPM    Atrial Rate 68 BPM    P-R Interval 138 ms    QRS Duration 84 ms    Q-T Interval 378 ms    QTC Calculation(Bazett) 401 ms    P Axis 26 degrees    R Axis 29 degrees    T Axis 43 degrees    Diagnosis Line NORMAL SINUS RHYTHM  NORMAL ECG  WHEN COMPARED WITH ECG OF 22-APR-2021 13:53, (UNCONFIRMED)  SIGNIFICANT CHANGES HAVE OCCURRED  Confirmed by ARTURO SYED SONIA ([id number]) on 4/24/2021 7:31:57 AM
  Ventricular Rate 68 BPM    Atrial Rate 68 BPM    P-R Interval 138 ms    QRS Duration 84 ms    Q-T Interval 378 ms    QTC Calculation(Bazett) 401 ms    P Axis 26 degrees    R Axis 29 degrees    T Axis 43 degrees    Diagnosis Line NORMAL SINUS RHYTHM  NORMAL ECG  WHEN COMPARED WITH ECG OF 22-APR-2021 13:53, (UNCONFIRMED)  SIGNIFICANT CHANGES HAVE OCCURRED  Confirmed by ARTURO SYED SONIA ([id number]) on 4/24/2021 7:31:57 AM

## 2021-05-23 NOTE — BH CONSULTATION LIAISON PROGRESS NOTE - NSBHCONSULTRECOMMENDOTHER_PSY_A_CORE FT
Continue Lexapro 10mg  Continue Modafinil 100mg  Continue Ramelteon 8mg
Continue Lexapro 10mg  Continue Modafinil 100mg  Continue Ramelteon 8mg

## 2021-05-23 NOTE — DIETITIAN NUTRITION RISK NOTIFICATION - TREATMENT: THE FOLLOWING DIET HAS BEEN RECOMMENDED
Diet, Regular:   Kosher  Supplement Feeding Modality:  Oral  Ensure Enlive Cans or Servings Per Day:  1       Frequency:  Three Times a day (05-23-21 @ 15:23) [Pending Verification By Attending]  Diet, Regular:   Kosher  Supplement Feeding Modality:  Oral  Ensure Enlive Cans or Servings Per Day:  1       Frequency:  Two Times a day (04-28-21 @ 14:04) [Active]

## 2021-05-23 NOTE — BH CONSULTATION LIAISON PROGRESS NOTE - NSICDXBHPRIMARYDX_PSY_ALL_CORE
Acute adjustment disorder with depressed mood   F43.21  
Acute adjustment disorder with depressed mood   F43.21

## 2021-05-23 NOTE — BH CONSULTATION LIAISON PROGRESS NOTE - NSICDXBHSECONDARYDX_PSY_ALL_CORE
Primary sleep disturbance   G47.9  Cognitive deficits   R41.89  
Primary sleep disturbance   G47.9  Cognitive deficits   R41.89

## 2021-05-24 LAB
ALBUMIN SERPL ELPH-MCNC: 3.2 G/DL — LOW (ref 3.3–5)
ALP SERPL-CCNC: 237 U/L — HIGH (ref 40–120)
ALT FLD-CCNC: 24 U/L — SIGNIFICANT CHANGE UP (ref 10–45)
ANION GAP SERPL CALC-SCNC: 8 MMOL/L — SIGNIFICANT CHANGE UP (ref 5–17)
AST SERPL-CCNC: 17 U/L — SIGNIFICANT CHANGE UP (ref 10–40)
BASOPHILS # BLD AUTO: 0.03 K/UL — SIGNIFICANT CHANGE UP (ref 0–0.2)
BASOPHILS NFR BLD AUTO: 0.4 % — SIGNIFICANT CHANGE UP (ref 0–2)
BILIRUB SERPL-MCNC: 0.5 MG/DL — SIGNIFICANT CHANGE UP (ref 0.2–1.2)
BUN SERPL-MCNC: 22 MG/DL — SIGNIFICANT CHANGE UP (ref 7–23)
CALCIUM SERPL-MCNC: 8.9 MG/DL — SIGNIFICANT CHANGE UP (ref 8.4–10.5)
CHLORIDE SERPL-SCNC: 105 MMOL/L — SIGNIFICANT CHANGE UP (ref 96–108)
CO2 SERPL-SCNC: 28 MMOL/L — SIGNIFICANT CHANGE UP (ref 22–31)
CREAT SERPL-MCNC: 0.64 MG/DL — SIGNIFICANT CHANGE UP (ref 0.5–1.3)
EOSINOPHIL # BLD AUTO: 0.07 K/UL — SIGNIFICANT CHANGE UP (ref 0–0.5)
EOSINOPHIL NFR BLD AUTO: 1 % — SIGNIFICANT CHANGE UP (ref 0–6)
GLUCOSE SERPL-MCNC: 108 MG/DL — HIGH (ref 70–99)
HCT VFR BLD CALC: 38.3 % — SIGNIFICANT CHANGE UP (ref 34.5–45)
HGB BLD-MCNC: 12.4 G/DL — SIGNIFICANT CHANGE UP (ref 11.5–15.5)
IMM GRANULOCYTES NFR BLD AUTO: 0.6 % — SIGNIFICANT CHANGE UP (ref 0–1.5)
LYMPHOCYTES # BLD AUTO: 1.2 K/UL — SIGNIFICANT CHANGE UP (ref 1–3.3)
LYMPHOCYTES # BLD AUTO: 17.7 % — SIGNIFICANT CHANGE UP (ref 13–44)
MCHC RBC-ENTMCNC: 29.7 PG — SIGNIFICANT CHANGE UP (ref 27–34)
MCHC RBC-ENTMCNC: 32.4 GM/DL — SIGNIFICANT CHANGE UP (ref 32–36)
MCV RBC AUTO: 91.6 FL — SIGNIFICANT CHANGE UP (ref 80–100)
MONOCYTES # BLD AUTO: 0.5 K/UL — SIGNIFICANT CHANGE UP (ref 0–0.9)
MONOCYTES NFR BLD AUTO: 7.4 % — SIGNIFICANT CHANGE UP (ref 2–14)
NEUTROPHILS # BLD AUTO: 4.95 K/UL — SIGNIFICANT CHANGE UP (ref 1.8–7.4)
NEUTROPHILS NFR BLD AUTO: 72.9 % — SIGNIFICANT CHANGE UP (ref 43–77)
NRBC # BLD: 0 /100 WBCS — SIGNIFICANT CHANGE UP (ref 0–0)
PLATELET # BLD AUTO: 180 K/UL — SIGNIFICANT CHANGE UP (ref 150–400)
POTASSIUM SERPL-MCNC: 4.1 MMOL/L — SIGNIFICANT CHANGE UP (ref 3.5–5.3)
POTASSIUM SERPL-SCNC: 4.1 MMOL/L — SIGNIFICANT CHANGE UP (ref 3.5–5.3)
PROT SERPL-MCNC: 6.7 G/DL — SIGNIFICANT CHANGE UP (ref 6–8.3)
RBC # BLD: 4.18 M/UL — SIGNIFICANT CHANGE UP (ref 3.8–5.2)
RBC # FLD: 14 % — SIGNIFICANT CHANGE UP (ref 10.3–14.5)
SODIUM SERPL-SCNC: 141 MMOL/L — SIGNIFICANT CHANGE UP (ref 135–145)
WBC # BLD: 6.79 K/UL — SIGNIFICANT CHANGE UP (ref 3.8–10.5)
WBC # FLD AUTO: 6.79 K/UL — SIGNIFICANT CHANGE UP (ref 3.8–10.5)

## 2021-05-24 PROCEDURE — 99233 SBSQ HOSP IP/OBS HIGH 50: CPT

## 2021-05-24 PROCEDURE — 99232 SBSQ HOSP IP/OBS MODERATE 35: CPT

## 2021-05-24 RX ORDER — MODAFINIL 200 MG/1
50 TABLET ORAL
Refills: 0 | Status: DISCONTINUED | OUTPATIENT
Start: 2021-05-25 | End: 2021-05-29

## 2021-05-24 RX ORDER — TRAZODONE HCL 50 MG
50 TABLET ORAL AT BEDTIME
Refills: 0 | Status: DISCONTINUED | OUTPATIENT
Start: 2021-05-24 | End: 2021-06-02

## 2021-05-24 RX ORDER — METOPROLOL TARTRATE 50 MG
50 TABLET ORAL DAILY
Refills: 0 | Status: DISCONTINUED | OUTPATIENT
Start: 2021-05-24 | End: 2021-06-01

## 2021-05-24 RX ORDER — METOPROLOL TARTRATE 50 MG
50 TABLET ORAL DAILY
Refills: 0 | Status: DISCONTINUED | OUTPATIENT
Start: 2021-05-24 | End: 2021-05-24

## 2021-05-24 RX ORDER — OXYCODONE HYDROCHLORIDE 5 MG/1
5 TABLET ORAL
Refills: 0 | Status: DISCONTINUED | OUTPATIENT
Start: 2021-05-24 | End: 2021-05-29

## 2021-05-24 RX ADMIN — SENNA PLUS 2 TABLET(S): 8.6 TABLET ORAL at 22:59

## 2021-05-24 RX ADMIN — Medication 1 TABLET(S): at 11:05

## 2021-05-24 RX ADMIN — Medication 1 MILLIGRAM(S): at 11:05

## 2021-05-24 RX ADMIN — LIDOCAINE 1 PATCH: 4 CREAM TOPICAL at 11:06

## 2021-05-24 RX ADMIN — ENOXAPARIN SODIUM 40 MILLIGRAM(S): 100 INJECTION SUBCUTANEOUS at 06:50

## 2021-05-24 RX ADMIN — POLYETHYLENE GLYCOL 3350 17 GRAM(S): 17 POWDER, FOR SOLUTION ORAL at 11:05

## 2021-05-24 RX ADMIN — ZINC OXIDE 1 APPLICATION(S): 200 OINTMENT TOPICAL at 18:11

## 2021-05-24 RX ADMIN — ZINC OXIDE 1 APPLICATION(S): 200 OINTMENT TOPICAL at 06:50

## 2021-05-24 RX ADMIN — Medication 10 MILLIGRAM(S): at 18:10

## 2021-05-24 RX ADMIN — Medication 250 MILLIGRAM(S): at 18:10

## 2021-05-24 RX ADMIN — LIDOCAINE 1 PATCH: 4 CREAM TOPICAL at 23:30

## 2021-05-24 RX ADMIN — OXYCODONE HYDROCHLORIDE 10 MILLIGRAM(S): 5 TABLET ORAL at 08:59

## 2021-05-24 RX ADMIN — Medication 112 MICROGRAM(S): at 06:50

## 2021-05-24 RX ADMIN — MODAFINIL 100 MILLIGRAM(S): 200 TABLET ORAL at 06:50

## 2021-05-24 RX ADMIN — Medication 10 MILLIGRAM(S): at 06:50

## 2021-05-24 RX ADMIN — NYSTATIN CREAM 1 APPLICATION(S): 100000 CREAM TOPICAL at 18:11

## 2021-05-24 RX ADMIN — LIDOCAINE 1 PATCH: 4 CREAM TOPICAL at 20:00

## 2021-05-24 RX ADMIN — Medication 250 MILLIGRAM(S): at 06:50

## 2021-05-24 RX ADMIN — TAMSULOSIN HYDROCHLORIDE 0.4 MILLIGRAM(S): 0.4 CAPSULE ORAL at 22:59

## 2021-05-24 RX ADMIN — NYSTATIN CREAM 1 APPLICATION(S): 100000 CREAM TOPICAL at 06:51

## 2021-05-24 RX ADMIN — ESCITALOPRAM OXALATE 10 MILLIGRAM(S): 10 TABLET, FILM COATED ORAL at 11:05

## 2021-05-24 RX ADMIN — Medication 50 MILLIGRAM(S): at 22:59

## 2021-05-24 RX ADMIN — OXYCODONE HYDROCHLORIDE 10 MILLIGRAM(S): 5 TABLET ORAL at 08:17

## 2021-05-24 NOTE — PROGRESS NOTE ADULT - ASSESSMENT
66 yo woman with PMHx breast cancer s/p mastectomy, HTN, Hypothyroidism admitted to [facility] after hospital course at [facility] after a fall from a flight of stairs, sustaining SAH, left-sided rib fractures, left iliac wing fracture with hematoma, left forehead laceration, s/p successful closed reduction of left pinky PIP dislocation, Acute L3 compression fracture, 4/29 admitted to [facility]    #Fall  #SAH  #Multiple fractures  -continue comprehensive rehabilitation program per PMR  -continue safety monitoring     #Urinary retention  #Constipation  -continue Flomax / urecholine  -will need Spangler when discharged if retention persists  -continue bowel regimen    #Normocytic Anemia with folic acid deficiency - stable  - monitor H/H    #Elevated ALKPHOS  -Due to multiple fractures   - avoid hepatotoxins    #Episodes of SVT   -Continue decreased dose of Metoprolol  -Can follow up in 3 months with EP Dr. Hernández [phone]. (elective ablation)    #Hypothyroidism  -Continue Synthroid    #VTE PPX   -Lovenox

## 2021-05-24 NOTE — PROGRESS NOTE ADULT - SUBJECTIVE AND OBJECTIVE BOX
[facility] ED     Emergency Department     Faculty Attestation    I performed a history and physical examination of the patient and discussed management with the resident. I reviewed the residents note and agree with the documented findings and plan of care. Any areas of disagreement are noted on the chart. I was personally present for the key portions of any procedures. I have documented in the chart those procedures where I was not present during the key portions. I have reviewed the emergency nurses triage note. I agree with the chief complaint, past medical history, past surgical history, allergies, medications, social and family history as documented unless otherwise noted below. For Physician Assistant/ Nurse Practitioner cases/documentation I have personally evaluated this patient and have completed at least one if not all key elements of the E/M (history, physical exam, and MDM). Additional findings are as noted. This patient was evaluated in the Emergency Department for symptoms described in the history of present illness. He/she was evaluated in the context of the global COVID-19 pandemic, which necessitated consideration that the patient might be at risk for infection with the SARS-CoV-2 virus that causes COVID-19. Institutional protocols and algorithms that pertain to the evaluation of patients at risk for COVID-19 are in a state of rapid change based on information released by regulatory bodies including the CDC and federal and state organizations. These policies and algorithms were followed during the patient's care in the ED. Patient here with chest pain left-sided anterior nonradiating. Shortness of breath typical for him with his COPD. Cough is minimally productive again at his baseline. No fevers no recent URI complaints no change in sense of taste or smell. Patient appears uncomfortable but nontoxic. No respiratory distress speaking in full sentences.   Equal radial pedal pulses bilaterally. No pulsatile abdominal mass no calf tenderness no edema. Will check labs EKG chest x-ray anticipate admission    EKG interpretation: Sinus rhythm 68. Normal intervals. Borderline right axis. No acute ST elevations, there is nonspecific ST elevation in the anterior leads however this is seen on prior EKG from 6/19/2020. There is also T wave inversion inferior laterally again seen on prior. Repeat EKG interpretation, 1011: Sinus rhythm 68. Normal intervals again seen right axis. No evolving ST changes again seen in the inferior lateral T wave inversion    Repeat EKG interpretation, 1125: Sinus bradycardia 59. Normal intervals right axis.   No acute ST elevation, again seen is the ST depression with T wave inversion inferior laterally no evolving changes    Critical Care     none    Faith Charles MD, Manjula Peralta  Attending Emergency  Physician             Faith Charles MD  08/10/20 1312   Patient is a 65y old  Female who presents with a chief complaint of SAH and fractures of left-sided ribs, left iliac wing with hematoma, S/P closed reduction of left pinky PIP dislocation after trauma (23 May 2021 17:45)      HPI:  65F PMHx breast cancer s/p mastectomy, HTN, hypothyroidism, not on full anticoagulation/antiplatelets per EMS, presented to [facility] 21as a level 2 trauma activation after a fall from a flight of stairs. Patient only able to say "ow" in trauma bay, unable to provide further history. However per son, normal mentation at baseline. In the trauma bay, airway was intact with bilateral breath sounds, O2 saturation 100% on room air. Initially blood pressure was 140s systolic however on repeat 90s. NS@100 initiated. Secondary significant for left forehead laceration, left chest wall tenderness, left pelvic tenderness.     Ms. Arthur was hypotensive in the trauma bay and was transferred to the Surgical ICU after imaging was obtained. Her injury list is below.  (1) SAH, bilateral: stable on repeat imaging. Seven day course of Keppra for post-traumatic seizure prophylaxis.  (2) left 3-8 and right 7th rib fractures with occult left pneumothorax: pain control  (3) left iliac wing fracture with hematoma: required transfusion early in her ICU course, after which CBC were stable  (4) left forehead laceration s/p suture repair  (5) s/p successful closed reduction of left pinky PIP dislocation with delfin splint  (6) left clavicle fracture  (7) left nasal bone and left orbital rim fractures  (8) sternal fracture  Incidental findings: right renal lesion. Pt's  was notified and copy of the report given to him.     The patient was admitted to Trauma Surgery under SICU care.  Ortho was consulted for L. iliac wing fx, sternal fx, L. clavicle fx- recommended WBAT of the affected left lower extremity with walker, WBAT L shoulder, pendulum exercises okay, no lifting, sling for comfort, Encourage ROM of elbow/wrist/hand, PT/OT consult, NO urgent orthopedic surgical intervention at this time.    Neurosurgery consulted- recommended repeat CTH, which was stable.     PRS was consulted for non displaced left superior orbital rim and non displaced left nasal bone fracture--> No indication for operative fixation of these non displaced fractures; recommend ice to face to aid in swelling and analgesia.    She was placed on hemorrhage watch in the SICU given pelvic fracture and surrounding hematoma.  Behavior health was consulted for delirium.    Hand was consulted for L. pinky PIP dislocation- s/p closed reduction, rec Cont delfin taping for now.    - CTH stable  - Added home lexapro  - Added NS @ 50cc/hr to supplement poor PO intake  - Lactate cleared (1.9)    - Transfused 1u pRBC, Hct 20.9 -> 26.9  - Went into SVT, given adenosine x1 and started on metoprolol 25mg q12hr  - Added oxycodone 5mg q4hrs prn for increased pain control  - PO intake improved, IVL    - EP was consulted for SVT, recc continue lopressor 25mg   Metoprolol PO increased from 25mg q12hrs to 25mg q6hrs.   - Added salt tabs 1g q8hrs and started on NS @ 50cc/hr for hyponatremia  - Avila discontinued, passed TOV  - Added lovenox  - Cervical collar cleared by confrontational exam     - Patient transferred from SICU to surgical floor in stable condition.  Discharge planning to acute rehab.     Stable from neurologic perspective consistent with mild TBI  Multiple rib fractures - breathing comfortable with multimodal pain control    On day of discharge, the patient was tolerating diet, working with PT well and pain controlled. The patient was medically stable for discharge to acute rehab with instructions for outpatient follow up 21. 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    C Section    Abnormality, eye  h/o left eye vitrectomy    S/P D&amp;C (status post dilation and curettage)    H/O left mastectomy        MEDICATIONS  (STANDING):  bethanechol 10 milliGRAM(s) Oral two times a day  enoxaparin Injectable 40 milliGRAM(s) SubCutaneous every 24 hours  escitalopram 10 milliGRAM(s) Oral daily  folic acid 1 milliGRAM(s) Oral daily  levothyroxine 112 MICROGram(s) Oral daily  lidocaine   Patch 1 Patch Transdermal daily  metoprolol succinate ER 50 milliGRAM(s) Oral daily  modafinil 50 milliGRAM(s) Oral <User Schedule>  multivitamin 1 Tablet(s) Oral daily  nystatin Powder 1 Application(s) Topical two times a day  oxyCODONE    IR 5 milliGRAM(s) Oral <User Schedule>  polyethylene glycol 3350 17 Gram(s) Oral daily  saccharomyces boulardii 250 milliGRAM(s) Oral two times a day  senna 2 Tablet(s) Oral at bedtime  tamsulosin 0.4 milliGRAM(s) Oral at bedtime  traZODone 50 milliGRAM(s) Oral at bedtime  zinc oxide 40% Ointment 1 Application(s) Topical two times a day    MEDICATIONS  (PRN):  acetaminophen   Tablet .. 650 milliGRAM(s) Oral every 6 hours PRN Temp greater or equal to 38C (100.4F), Mild Pain (1 - 3)      Allergies    Tapazole (Hives)    Intolerances          VITALS  65y  Vital Signs Last 24 Hrs  T(C): 36.7 (24 May 2021 08:04), Max: 36.7 (23 May 2021 20:35)  T(F): 98.1 (24 May 2021 08:04), Max: 98.1 (23 May 2021 20:35)  HR: 77 (24 May 2021 08:04) (73 - 80)  BP: 107/74 (24 May 2021 08:04) (101/66 - 109/74)  BP(mean): --  RR: 16 (24 May 2021 08:04) (16 - 16)  SpO2: 98% (24 May 2021 08:04) (94% - 98%)  Daily     Daily Weight in k.1 (23 May 2021 15:03)        RECENT LABS:                          12.4   6.79  )-----------( 180      ( 24 May 2021 06:45 )             38.3     05-24    141  |  105  |  22  ----------------------------<  108<H>  4.1   |  28  |  0.64    Ca    8.9      24 May 2021 06:45    TPro  6.7  /  Alb  3.2<L>  /  TBili  0.5  /  DBili  x   /  AST  17  /  ALT  24  /  AlkPhos  237<H>  05-24    LIVER FUNCTIONS - ( 24 May 2021 06:45 )  Alb: 3.2 g/dL / Pro: 6.7 g/dL / ALK PHOS: 237 U/L / ALT: 24 U/L / AST: 17 U/L / GGT: x                   CAPILLARY BLOOD GLUCOSE              Review of Systems:   · Additional ROS	 Patient on 2:1, continues to be restless and impulsive at times, noted by staff to try to stand from chair and h/o recurrent falls. Renewed    Plastics appreciated, sutures removed and steri strips applied. To leave on    +avila in place. does NOT complain of pain but states sleep is still poor, about same    Physical Exam:   · Constitutional	detailed exam  · Constitutional Details	no distress  · Constitutional Comments	 GCS=14 ([id number])   facial ecchymoses and swellingcontinues to improve. STeri strips in place, no oozing or redness    ·	HEENT	  EOMI, conjunctiva clear  	  · Respiratory	detailed exam  · Respiratory Details	breath sounds equal; respirations non-labored; reduced BS bases  · Cardiovascular	detailed exam  · Cardiovascular Details	regular rate and rhythm  · Gastrointestinal	detailed exam  · GI Normal	nontender; no distention; bowel sounds normal  	  · Extremities	 bialteral calves soft, NT no erythema or warmth

## 2021-05-24 NOTE — PROGRESS NOTE ADULT - SUBJECTIVE AND OBJECTIVE BOX
Patient is a 65y old  Female who presents with a chief complaint of SAH and fractures of left-sided ribs, left iliac wing with hematoma, S/P closed reduction of left pinky PIP dislocation after trauma (23 May 2021 17:45)      SUBJECTIVE / OVERNIGHT EVENTS:  Pt seen and examined at bedside. No acute events overnight.  Pt denies cp, palpitations, sob, abd pain, N/V, fever, chills.    ROS:  All other review of systems negative    Allergies    Tapazole (Hives)    Intolerances        MEDICATIONS  (STANDING):  bethanechol 10 milliGRAM(s) Oral two times a day  enoxaparin Injectable 40 milliGRAM(s) SubCutaneous every 24 hours  escitalopram 10 milliGRAM(s) Oral daily  folic acid 1 milliGRAM(s) Oral daily  levothyroxine 112 MICROGram(s) Oral daily  lidocaine   Patch 1 Patch Transdermal daily  metoprolol succinate  milliGRAM(s) Oral daily  modafinil 100 milliGRAM(s) Oral <User Schedule>  multivitamin 1 Tablet(s) Oral daily  nystatin Powder 1 Application(s) Topical two times a day  oxyCODONE    IR 5 milliGRAM(s) Oral <User Schedule>  polyethylene glycol 3350 17 Gram(s) Oral daily  saccharomyces boulardii 250 milliGRAM(s) Oral two times a day  senna 2 Tablet(s) Oral at bedtime  tamsulosin 0.4 milliGRAM(s) Oral at bedtime  traZODone 50 milliGRAM(s) Oral at bedtime  zinc oxide 40% Ointment 1 Application(s) Topical two times a day    MEDICATIONS  (PRN):  acetaminophen   Tablet .. 650 milliGRAM(s) Oral every 6 hours PRN Temp greater or equal to 38C (100.4F), Mild Pain (1 - 3)      Vital Signs Last 24 Hrs  T(C): 36.7 (24 May 2021 08:04), Max: 36.7 (23 May 2021 20:35)  T(F): 98.1 (24 May 2021 08:04), Max: 98.1 (23 May 2021 20:35)  HR: 77 (24 May 2021 08:04) (73 - 80)  BP: 107/74 (24 May 2021 08:04) (101/66 - 109/74)  BP(mean): --  RR: 16 (24 May 2021 08:04) (16 - 16)  SpO2: 98% (24 May 2021 08:04) (94% - 98%)  CAPILLARY BLOOD GLUCOSE        I&O's Summary    23 May 2021 07:01  -  24 May 2021 07:00  --------------------------------------------------------  IN: 0 mL / OUT: 1880 mL / NET: -1880 mL    24 May 2021 07:01  -  24 May 2021 11:41  --------------------------------------------------------  IN: 0 mL / OUT: 200 mL / NET: -200 mL        PHYSICAL EXAM:  GENERAL: NAD, well-developed  HEAD:  Atraumatic, Normocephalic  EYES: periorbital ecchymosis   CHEST/LUNG: Clear to auscultation bilaterally; No wheeze, nonlabored breathing  HEART: Regular rate and rhythm; No murmurs, rubs, or gallops  ABDOMEN: Soft, Nontender, Nondistended; Bowel sounds present  EXTREMITIES:  2+ Peripheral Pulses, No clubbing, cyanosis, or edema  NEUROLOGY: AAOx3, non-focal  PSYCH: calm, appropriate mood    LABS:                        12.4   6.79  )-----------( 180      ( 24 May 2021 06:45 )             38.3     05-24    141  |  105  |  22  ----------------------------<  108<H>  4.1   |  28  |  0.64    Ca    8.9      24 May 2021 06:45    TPro  6.7  /  Alb  3.2<L>  /  TBili  0.5  /  DBili  x   /  AST  17  /  ALT  24  /  AlkPhos  237<H>  05-24              RADIOLOGY & ADDITIONAL TESTS:  Results Reviewed:   Imaging Personally Reviewed:  Electrocardiogram Personally Reviewed:    COORDINATION OF CARE:  Care Discussed with Consultants/Other Providers [Y/N]:  Prior or Outpatient Records Reviewed [Y/N]:

## 2021-05-24 NOTE — PROGRESS NOTE ADULT - ASSESSMENT
JONATHAN CHILD is a 65y with PMHx breast Ca S/P mastectomy, HTN, hypothyroidism who presented to [facility] 4/19/21 s/p fall down a flight of stairs with bilateral frontoparietal SAH; L nasal bone and L orbital rim fractures; sternal fracture; L 3-8, R 7th rib fractures;  L pneumothorax, L iliac wing fracture with hematoma S/P 1U pRBCs; left clavicle fx, left V finger PIP dislocation s/p CR. She is WBAT Left LE, NWB Left UE.     # t SAH  - Avoid NSAIDs  - on modafinil 100 mg daily. Will reduce to 50 mg due to restlessness, insomnia, monitor 5/24  - F/u neurosurgery, Dr. Vikas Aquino, upon discharge  - Cont comprehensive rehab program, PT/OT/SLP 3 hours a day, 5 days a week.  - Precautions: falls, sternal , WB as above, AMS, cardiac, h/o breast CA    #RRT S/P Fall 5/17/21 with head strike and lacerations left lateral brow and forehead  - Stat CTH, CT cervical, CT C/A/P no acute findings 5/17.  - Spoke with Dr. Wheatley ([facility]) via transfer center, imaging reviewed and no indication for acute transfer.   - Plastics consulted and appreciated 5/17, and forehead laceration x2 sutured  ·	steri strips are to be kept clean and dry and not to be removed before 6/2/21.  ·	from 5/34 -6/2/21, call plastic surgery on call for any emergency  - Repeat Head CT 12 hours post on 5/18 unremarkable  - Lovenox restarted 5/19. Hgb 12.4 --> 11.6 5/20--> 11.5 5/21 --> 12.4 improved 5/24  - continue 2:1 supervision, reassessed 5/24    # Multiple fractures. WBAT LLE, NWB LUE   - cleared for ROM exercises for L elbow, wrist, hand.  L shoulder pendulum exercises. Sling to LUE for comfort. No lifting more than 1-3 pounds for 6 weeks.  - LSO brace whenever OOB.  LSO replaced 5/14  -  buddy tape dc'd for L 5th finger dislocation (per Dr. Choe 5/10)  - Continue Lovenox for 6 weeks total (through 6/3)  - pain management as below  - follow up:  ·	plastic surgeon Dr. Nell Choe, for facial laceration, L pinky dislocation, L nasal bone and orbital rim fractures.   ·	ortho, Dr. Reji Cardoza, for L clavicle and L pelvic wing fractures  ·	surgery, Dr. Dino Peña, for rib fractures    # Acute Hypoxic Respiratory Failure likely due to Acute B/L rib fractures, and small pneumothorax  - Incentive spirometry. Breathing exercises  - Supplemental Oxygen PRN to keep O2 sats > 92%    # Elevated Alk Phos likely 2/2 trauma, bone fractures  - Hepatic sono 5/7: Multiple gallstones, without evidence for gallbladder wall thickening or pericholecystic fluid.  - 5/7 GGT 37, AST  13  /  ALT  22  /  AlkPhos  271<H>  05-20 --> AST  17  /  ALT  24  /  AlkPhos  237<H>  05-24 improved  - CMP 5/27    # H/o SVT  - Continue with toprol XL . Reduced to 50 mg daily from 100 mg as was held due to low SBP, HR controlled 5/24  - Can follow up in 3 months with EP Dr. Hernández [phone]. (elective ablation)    #R kidney lesion  - F/u Dr. Herrera outpatient or PCP    #Hypothyroidism  - Continue Synthroid 112mcg daily    # Depression: stable/improved  - Psychiatry f/u 5/11 read and appreciated:   ·	Continue Lexapro 10mg  ·	Continue Modafinil 100mg  ·	Ramelteon 8mg dc. Trazodone 25mg QHS started 5/21. Will increase to 50 mg 5/24    # Pain  - Tylenol  - Lidoderm to L chest wall  - Scheduled Oxycodone 10mg at 8am before therapy to help improve pain control. Reduce to 5 mg as pain improving, look to wean 5/24    # GI/Bowel:  - Senna QHS, Miralax Daily    # /Bladder:   - Avila placed 4/29/21 for urinary retention. Failed TOV 5/6   - Urine Cx: E. coli: started on Nitrofurantoin, switched to CTX for 3 day course, finished 5/18  - UA 5/17 negative  -  consult 5/18 appreciated  ·	Continue Bethanechol 10 bid  ·	Continue Flomax 0.4 mg daily  ·	Avila ordered 5/18 due to discomfort, continued straight caths, worsening PVR, restlessness leading to increased fall risk. Will likely need avila on dc per . Renewed 5/24    # Diet  - regular solids thin liquids  - 1:1 supervision for meals    # DVT ppx:  - Lovenox  - SCDs    # Case discussed in IDT 5/19/21:  - tolerating regular diet. +severe cognitive impairments, min assist transfers and ambulation, negotiates 4 step with min assist, set up/supervision eating, min assist grooming, mod assist UB/LB dressing, mod assist shower, max toileting  - Goal: supervision grooming, bathing LBD, Mark shower transfers, Mark transfers and ambulation  - JULI next week if neurologically and medically stable 5/24      #LABS  avila catheter  2:1 observation  CBC BMP 5/27      ---------------  Outpatient Follow-up (Specialty/Name of physician):  Silvina Herrera  INTERNAL MEDICINE  [address] [place] [address]  Phone: [phone]  Fax: [phone]  Follow Up Time: 1 week    Vikas Aquino)  Neurosurgery  [address] [place] [address], [address] [place] [address]  Phone: [phone]  Fax: [phone]  Follow Up Time: 2 weeks    Nell Choe)  Plastic Surgery  [address] [facility], [address]  [place], [place] [address]  Phone: [phone]  Fax: [phone]  Follow Up Time: 1 week    Reji Cardoza)  Orthopaedic Surgery  [address] [facility], [address]  [place] [address]  Phone: [phone]  Fax: [phone]  Follow Up Time: Routine    Dino Peña)  Surgery; Surgical Critical Care  [address] [facility], [address], [place] [address]  Phone: [phone]  Fax: [phone]  Follow Up Time: Routine    Morgan Hernández; PhD)  Cardiac Electrophysiology; Cardiovascular Disease; Internal Medicine  [facility] - Dept of Cardiology, [address] [place] [address]  Phone: [phone]  Fax: [phone]   JONATHAN CHILD is a 65y with PMHx breast Ca S/P mastectomy, HTN, hypothyroidism who presented to [facility] 4/19/21 s/p fall down a flight of stairs with bilateral frontoparietal SAH; L nasal bone and L orbital rim fractures; sternal fracture; L 3-8, R 7th rib fractures;  L pneumothorax, L iliac wing fracture with hematoma S/P 1U pRBCs; left clavicle fx, left V finger PIP dislocation s/p CR. She is WBAT Left LE, NWB Left UE.     # t SAH  - Avoid NSAIDs  - on modafinil 100 mg daily. Will reduce to 50 mg due to restlessness, insomnia, monitor 5/24  - F/u neurosurgery, Dr. Vikas Aquino, upon discharge  - Cont comprehensive rehab program, PT/OT/SLP 3 hours a day, 5 days a week.  - Precautions: falls, sternal , WB as above, AMS, cardiac, h/o breast CA    #RRT S/P Fall 5/17/21 with head strike and lacerations left lateral brow and forehead  - Stat CTH, CT cervical, CT C/A/P no acute findings 5/17.  - Spoke with Dr. Wheatley ([facility]) via transfer center, imaging reviewed and no indication for acute transfer.   - Plastics consulted and appreciated 5/17, and forehead laceration x2 sutured  ·	steri strips are to be kept clean and dry and not to be removed before 6/2/21.  ·	from 5/34 -6/2/21, call plastic surgery on call for any emergency  - Repeat Head CT 12 hours post on 5/18 unremarkable  - Lovenox restarted 5/19. Hgb 12.4 --> 11.6 5/20--> 11.5 5/21 --> 12.4 improved 5/24  - continue 2:1 supervision, reassessed 5/24    # Multiple fractures. WBAT LLE, NWB LUE   - cleared for ROM exercises for L elbow, wrist, hand.  L shoulder pendulum exercises. Sling to LUE for comfort. No lifting more than 1-3 pounds for 6 weeks.  - LSO brace whenever OOB.  LSO replaced 5/14  -  buddy tape dc'd for L 5th finger dislocation (per Dr. Choe 5/10)  - Continue Lovenox for 6 weeks total (through 6/3)  - pain management as below  - follow up:  ·	plastic surgeon Dr. Nell Choe, for facial laceration, L pinky dislocation, L nasal bone and orbital rim fractures.   ·	ortho, Dr. Reji Cardoza, for L clavicle and L pelvic wing fractures  ·	surgery, Dr. Dino Peña, for rib fractures    # Acute Hypoxic Respiratory Failure likely due to Acute B/L rib fractures, and small pneumothorax  - Incentive spirometry. Breathing exercises  - Supplemental Oxygen PRN to keep O2 sats > 92%    # Elevated Alk Phos likely 2/2 trauma, bone fractures  - Hepatic sono 5/7: Multiple gallstones, without evidence for gallbladder wall thickening or pericholecystic fluid.  - 5/7 GGT 37, AST  13  /  ALT  22  /  AlkPhos  271<H>  05-20 --> AST  17  /  ALT  24  /  AlkPhos  237<H>  05-24 improved  - CMP 5/27    # H/o SVT  - Continue with toprol XL . Reduced to 50 mg daily from 100 mg as was held due to low SBP, HR controlled 5/24  - Can follow up in 3 months with EP Dr. Hernández [phone]. (elective ablation)    #R kidney lesion  - F/u Dr. Herrera outpatient or PCP    #Hypothyroidism  - Continue Synthroid 112mcg daily    # Depression: stable/improved  - Psychiatry f/u 5/11 read and appreciated:   ·	Continue Lexapro 10mg  ·	Continue Modafinil 100mg  ·	Ramelteon 8mg dc. Trazodone 25mg QHS started 5/21. Will increase to 50 mg 5/24    # Pain  - Tylenol  - Lidoderm to L chest wall  - Scheduled Oxycodone 10mg at 8am before therapy to help improve pain control. Reduce to 5 mg as pain improving, look to wean 5/24    # GI/Bowel:  - Senna QHS, Miralax Daily    # /Bladder:   - Avila placed 4/29/21 for urinary retention. Failed TOV 5/6   - Urine Cx: E. coli: started on Nitrofurantoin, switched to CTX for 3 day course, finished 5/18  - UA 5/17 negative  -  consult 5/18 appreciated  ·	Continue Bethanechol 10 bid  ·	Continue Flomax 0.4 mg daily  ·	Avila ordered 5/18 due to discomfort, continued straight caths, worsening PVR, restlessness leading to increased fall risk. Will likely need avila on dc per . Renewed 5/24    # Diet  - regular solids thin liquids  - 1:1 supervision for meals    # DVT ppx:  - Lovenox  - SCDs    # Case discussed in IDT 5/19/21:  - tolerating regular diet. +severe cognitive impairments, min assist transfers and ambulation, negotiates 4 step with min assist, set up/supervision eating, min assist grooming, mod assist UB/LB dressing, mod assist shower, max toileting  - Goal: supervision grooming, bathing LBD, Mark shower transfers, Mark transfers and ambulation  - Patient for JULI placement, however staff feels her risk of falls is high and she may do better in home setting with 24 hour supervision. Recommend family meeting this week to discuss options and team recs with family      #LABS  avila catheter  2:1 observation  CBC [facility] 5/27      ---------------  Outpatient Follow-up (Specialty/Name of physician):  Silvina Herrera  INTERNAL MEDICINE  [address] [place] [address]  Phone: [phone]  Fax: [phone]  Follow Up Time: 1 week    Vikas Aquino)  Neurosurgery  [address] [facility], [address] [place] [address]  Phone: [phone]  Fax: [phone]  Follow Up Time: 2 weeks    Nell Choe)  Plastic Surgery  [address] [facility], [address]  [place], [place] [address]  Phone: [phone]  Fax: [phone]  Follow Up Time: 1 week    Reji Cardoza)  Orthopaedic Surgery  [address] [facility], [address]  [place] [address]  Phone: [phone]  Fax: [phone]  Follow Up Time: Routine    Dino Peña)  Surgery; Surgical Critical Care  [address] [facility], [address], [place] [address]  Phone: [phone]  Fax: [phone]  Follow Up Time: Routine    Morgan Hernández; PhD)  Cardiac Electrophysiology; Cardiovascular Disease; Internal Medicine  [facility] - Dept of Cardiology, [address] [place] [address]  Phone: [phone]  Fax: [phone]

## 2021-05-25 PROCEDURE — 99232 SBSQ HOSP IP/OBS MODERATE 35: CPT

## 2021-05-25 RX ADMIN — ENOXAPARIN SODIUM 40 MILLIGRAM(S): 100 INJECTION SUBCUTANEOUS at 06:36

## 2021-05-25 RX ADMIN — OXYCODONE HYDROCHLORIDE 5 MILLIGRAM(S): 5 TABLET ORAL at 09:10

## 2021-05-25 RX ADMIN — Medication 1 TABLET(S): at 12:53

## 2021-05-25 RX ADMIN — Medication 250 MILLIGRAM(S): at 06:37

## 2021-05-25 RX ADMIN — POLYETHYLENE GLYCOL 3350 17 GRAM(S): 17 POWDER, FOR SOLUTION ORAL at 12:53

## 2021-05-25 RX ADMIN — ZINC OXIDE 1 APPLICATION(S): 200 OINTMENT TOPICAL at 17:09

## 2021-05-25 RX ADMIN — MODAFINIL 50 MILLIGRAM(S): 200 TABLET ORAL at 06:36

## 2021-05-25 RX ADMIN — LIDOCAINE 1 PATCH: 4 CREAM TOPICAL at 12:53

## 2021-05-25 RX ADMIN — Medication 112 MICROGRAM(S): at 06:37

## 2021-05-25 RX ADMIN — NYSTATIN CREAM 1 APPLICATION(S): 100000 CREAM TOPICAL at 06:38

## 2021-05-25 RX ADMIN — Medication 50 MILLIGRAM(S): at 06:37

## 2021-05-25 RX ADMIN — Medication 1 MILLIGRAM(S): at 12:52

## 2021-05-25 RX ADMIN — LIDOCAINE 1 PATCH: 4 CREAM TOPICAL at 20:45

## 2021-05-25 RX ADMIN — Medication 10 MILLIGRAM(S): at 06:37

## 2021-05-25 RX ADMIN — Medication 10 MILLIGRAM(S): at 17:08

## 2021-05-25 RX ADMIN — TAMSULOSIN HYDROCHLORIDE 0.4 MILLIGRAM(S): 0.4 CAPSULE ORAL at 21:00

## 2021-05-25 RX ADMIN — NYSTATIN CREAM 1 APPLICATION(S): 100000 CREAM TOPICAL at 17:08

## 2021-05-25 RX ADMIN — ZINC OXIDE 1 APPLICATION(S): 200 OINTMENT TOPICAL at 06:37

## 2021-05-25 RX ADMIN — ESCITALOPRAM OXALATE 10 MILLIGRAM(S): 10 TABLET, FILM COATED ORAL at 12:52

## 2021-05-25 RX ADMIN — OXYCODONE HYDROCHLORIDE 5 MILLIGRAM(S): 5 TABLET ORAL at 08:18

## 2021-05-25 RX ADMIN — Medication 50 MILLIGRAM(S): at 21:00

## 2021-05-25 RX ADMIN — SENNA PLUS 2 TABLET(S): 8.6 TABLET ORAL at 21:00

## 2021-05-25 NOTE — PROGRESS NOTE ADULT - ASSESSMENT
JONATHAN CHILD is a 65y with PMHx breast Ca S/P mastectomy, HTN, hypothyroidism who presented to [facility] 4/19/21 s/p fall down a flight of stairs with bilateral frontoparietal SAH; L nasal bone and L orbital rim fractures; sternal fracture; L 3-8, R 7th rib fractures;  L pneumothorax, L iliac wing fracture with hematoma S/P 1U pRBCs; left clavicle fx, left V finger PIP dislocation s/p CR. She is WBAT Left LE, NWB Left UE.     # t SAH  - Avoid NSAIDs  - Modafanil reduced to 50 mg due to restlessness, insomnia 5/24. Improved sleep reported this morning 5/25  - Cont comprehensive rehab program, PT/OT/SLP 3 hours a day, 5 days a week\- F/u neurosurgery, Dr. Vikas Aquino, upon discharge  - Precautions: falls, sternal , WB as above, AMS, cardiac, h/o breast CA    # S/P Fall 5/17/21 with head strike and lacerations left lateral brow and forehead  - Stat CTH, CT cervical, CT C/A/P no acute findings 5/17.  - Spoke with Dr. Wheatley ([facility]) via transfer center, imaging reviewed and no indication for acute transfer.   - Plastics consulted and appreciated 5/17, and forehead laceration x 2 sutured  ·	steri strips are to be kept clean and dry and not to be removed before 6/2/21.  ·	from 5/34 -6/2/21, call plastic surgery on call for any emergency  - Repeat Head CT 12 hours post on 5/18 unremarkable  - Lovenox restarted 5/19. Hgb 12.4 stable 5/24  - continue 2:1 supervision, reassessed 5/25    # Multiple fractures. WBAT LLE, NWB LUE   - OK ROM exercises for L elbow, wrist, hand.  L shoulder pendulum exercises. Sling to LUE for comfort. No lifting more than 1-3 pounds for 6 weeks. Will follow with ortho at that time for upgrade in WB/ROM (5/31)  - LSO brace whenever OOB. (replaced 5/14)  -  buddy tape dc'd for L 5th finger dislocation (per Dr. Choe 5/10)  - Continue Lovenox for 6 weeks total (through 6/3)  - follow up on dc:  ·	plastic surgeon Dr. Nell Choe, for facial laceration, L pinky dislocation, L nasal bone and orbital rim fractures.   ·	ortho, Dr. Reji Cardoza, for L clavicle and L pelvic wing fractures  ·	surgery, Dr. Dino Peña, for rib fractures    # Acute Hypoxic Respiratory Failure likely due to Acute B/L rib fractures, and small pneumothorax  - Incentive spirometry. Breathing exercises  - stable on RA    # Elevated Alk Phos likely 2/2 bone fractures  - Hepatic sono 5/7: Multiple gallstones, without evidence for gallbladder wall thickening or pericholecystic fluid.  -  AST  17  /  ALT  24  /  AlkPhos  237<H>  05-24 improved  - CMP 5/27    # H/o SVT  - Continue with toprol XL, reduced to 50 mg daily 5/24  - HR 66-85, BP (100/65 - 120/79) 5/24  - Can follow up in 3 months with EP Dr. Hernández [phone]. (elective ablation)    #R kidney lesion  - F/u Dr. Herrera outpatient or PCP    #Hypothyroidism  - Continue Synthroid 112mcg daily    # Depression: stable/improved  - Psychiatry f/u 5/11 read and appreciated:   ·	Continue Lexapro 10mg  ·	Continue Modafinil 50 mg, reduced insomnia and restlessness on lower dose  ·	Trazodone increased to 50 mg 5/24, improved sleep    # Pain  - Tylenol  - Lidoderm to L chest wall  - Scheduled Oxycodone reduced to 5 mg as pain improving 5/24, continue weaning    # GI/Bowel:  - Senna QHS, Miralax Daily    # /Bladder:   - Avila placed 4/29/21 for urinary retention. Failed TOV 5/6   - Urine Cx: E. coli: started on Nitrofurantoin, switched to CTX for 3 day course, finished 5/18  - UA 5/17 negative  -  consult 5/18 appreciated  ·	Continue Bethanechol 10 bid  ·	Continue Flomax 0.4 mg daily  ·	TOV today 5/25, dc avila and bladder scan q6. Safety for toileting reinforced with patient, patient and staff agreeable re: TOV    # Diet  - regular solids thin liquids  - 1:1 supervision for meals    # DVT ppx:  - Lovenox  - SCDs    # Case discussed in IDT 5/19/21:  - tolerating regular diet. +severe cognitive impairments, min assist transfers and ambulation, negotiates 4 step with min assist, set up/supervision eating, min assist grooming, mod assist UB/LB dressing, mod assist shower, max toileting  - Goal: supervision grooming, bathing LBD, Mark shower transfers, Mark transfers and ambulation  - Patient for JULI placement, however staff feels her risk of falls is high and she may do better in home setting with 24 hour supervision. Recommend family meeting this week to discuss options and team recs with family      #LABS  avila catheter--> dc TOV 5/25  bladder scan  2:1 observation, renewed 5/25  CBC BMP 5/27      ---------------  Outpatient Follow-up (Specialty/Name of physician):  Silvina Herrera  INTERNAL MEDICINE  [address] [place] [address]  Phone: [phone]  Fax: [phone]  Follow Up Time: 1 week    Vikas Aquino)  Neurosurgery  [address] [facility], [address] [place] [address]  Phone: [phone]  Fax: [phone]  Follow Up Time: 2 weeks    Nell Choe)  Plastic Surgery  [address] [facility], [address]  [place], [place] [address]  Phone: [phone]  Fax: [phone]  Follow Up Time: 1 week    Reji Cardoza)  Orthopaedic Surgery  [address] [facility] [address]  [place] [address]  Phone: [phone]  Fax: [phone]  Follow Up Time: Routine    Dino Peña)  Surgery; Surgical Critical Care  [address] [facility], [address] [address]  Phone: [phone]  Fax: [phone]  Follow Up Time: Routine    Morgan Hernández; PhD)  Cardiac Electrophysiology; Cardiovascular Disease; Internal Medicine  [facility] - Dept of Cardiology, [address] [place] [address]  Phone: [phone]  Fax: [phone]

## 2021-05-25 NOTE — PROGRESS NOTE ADULT - SUBJECTIVE AND OBJECTIVE BOX
Patient is a 65y old  Female who presents with a chief complaint of SAH and fractures of left-sided ribs, left iliac wing with hematoma, S/P closed reduction of left pinky PIP dislocation after trauma (24 May 2021 12:05)      SUBJECTIVE / OVERNIGHT EVENTS:  Pt seen and examined at bedside in the presence of therapist. No acute events overnight.  Pt denies cp, palpitations, sob, abd pain, N/V, fever, chills.    ROS:  All other review of systems negative    Allergies    Tapazole (Hives)    Intolerances        MEDICATIONS  (STANDING):  bethanechol 10 milliGRAM(s) Oral two times a day  enoxaparin Injectable 40 milliGRAM(s) SubCutaneous every 24 hours  escitalopram 10 milliGRAM(s) Oral daily  folic acid 1 milliGRAM(s) Oral daily  levothyroxine 112 MICROGram(s) Oral daily  lidocaine   Patch 1 Patch Transdermal daily  metoprolol succinate ER 50 milliGRAM(s) Oral daily  modafinil 50 milliGRAM(s) Oral <User Schedule>  multivitamin 1 Tablet(s) Oral daily  nystatin Powder 1 Application(s) Topical two times a day  oxyCODONE    IR 5 milliGRAM(s) Oral <User Schedule>  polyethylene glycol 3350 17 Gram(s) Oral daily  senna 2 Tablet(s) Oral at bedtime  tamsulosin 0.4 milliGRAM(s) Oral at bedtime  traZODone 50 milliGRAM(s) Oral at bedtime  zinc oxide 40% Ointment 1 Application(s) Topical two times a day    MEDICATIONS  (PRN):  acetaminophen   Tablet .. 650 milliGRAM(s) Oral every 6 hours PRN Temp greater or equal to 38C (100.4F), Mild Pain (1 - 3)      Vital Signs Last 24 Hrs  T(C): 36.4 (25 May 2021 08:00), Max: 36.8 (24 May 2021 20:24)  T(F): 97.5 (25 May 2021 08:00), Max: 98.2 (24 May 2021 20:24)  HR: 66 (25 May 2021 08:00) (66 - 85)  BP: 100/65 (25 May 2021 08:00) (100/65 - 120/79)  BP(mean): --  RR: 12 (25 May 2021 08:00) (12 - 16)  SpO2: 95% (25 May 2021 08:00) (95% - 97%)  CAPILLARY BLOOD GLUCOSE        I&O's Summary    24 May 2021 07:01  -  25 May 2021 07:00  --------------------------------------------------------  IN: 0 mL / OUT: 1400 mL / NET: -1400 mL        PHYSICAL EXAM:  GENERAL: NAD, well-developed  EYES: periorbital ecchymosis   CHEST/LUNG: Clear to auscultation bilaterally; No wheeze, nonlabored breathing  HEART: Regular rate and rhythm; No murmurs, rubs, or gallops  ABDOMEN: Soft, Nontender, Nondistended; Bowel sounds present  EXTREMITIES:  2+ Peripheral Pulses, No clubbing, cyanosis, or edema  NEUROLOGY: AAOx3  PSYCH: calm, appropriate mood    LABS:                        12.4   6.79  )-----------( 180      ( 24 May 2021 06:45 )             38.3     05-24    141  |  105  |  22  ----------------------------<  108<H>  4.1   |  28  |  0.64    Ca    8.9      24 May 2021 06:45    TPro  6.7  /  Alb  3.2<L>  /  TBili  0.5  /  DBili  x   /  AST  17  /  ALT  24  /  AlkPhos  237<H>  05-24              RADIOLOGY & ADDITIONAL TESTS:  Results Reviewed:   Imaging Personally Reviewed:  Electrocardiogram Personally Reviewed:    COORDINATION OF CARE:  Care Discussed with Consultants/Other Providers [Y/N]:  Prior or Outpatient Records Reviewed [Y/N]:

## 2021-05-25 NOTE — PROGRESS NOTE ADULT - ASSESSMENT
64 yo woman with PMHx breast cancer s/p mastectomy, HTN, Hypothyroidism admitted to [facility] after hospital course at [facility] after a fall from a flight of stairs, sustaining SAH, left-sided rib fractures, left iliac wing fracture with hematoma, left forehead laceration, s/p successful closed reduction of left pinky PIP dislocation, Acute L3 compression fracture, 4/29 admitted to [facility]    #Fall  #SAH  #Multiple fractures  -continue comprehensive rehabilitation program per PMR  -continue safety monitoring     #Urinary retention  #Constipation  -continue Flomax / urecholine  -will need Spangler when discharged if retention persists  -continue bowel regimen    #Normocytic Anemia with folic acid deficiency - stable  - monitor H/H    #Elevated ALKPHOS  -Due to multiple fractures   -Downtrending  -avoid hepatotoxins    #Episodes of SVT   -Continue Metoprolol  -Can follow up in 3 months with EP Dr. Hernández [phone]. (elective ablation)    #Hypothyroidism  -Continue Synthroid    #VTE PPX   -Lovenox

## 2021-05-25 NOTE — PROGRESS NOTE ADULT - SUBJECTIVE AND OBJECTIVE BOX
Patient is a 65y old  Female who presents with a chief complaint of SAH and fractures of left-sided ribs, left iliac wing with hematoma, S/P closed reduction of left pinky PIP dislocation after trauma (25 May 2021 09:32)      HPI:  65F PMHx breast cancer s/p mastectomy, HTN, hypothyroidism, not on full anticoagulation/antiplatelets per EMS, presented to [facility] 21as a level 2 trauma activation after a fall from a flight of stairs. Patient only able to say "ow" in trauma bay, unable to provide further history. However per son, normal mentation at baseline. In the trauma bay, airway was intact with bilateral breath sounds, O2 saturation 100% on room air. Initially blood pressure was 140s systolic however on repeat 90s. NS@100 initiated. Secondary significant for left forehead laceration, left chest wall tenderness, left pelvic tenderness.     Ms. Arthur was hypotensive in the trauma bay and was transferred to the Surgical ICU after imaging was obtained. Her injury list is below.  (1) SAH, bilateral: stable on repeat imaging. Seven day course of Keppra for post-traumatic seizure prophylaxis.  (2) left 3-8 and right 7th rib fractures with occult left pneumothorax: pain control  (3) left iliac wing fracture with hematoma: required transfusion early in her ICU course, after which CBC were stable  (4) left forehead laceration s/p suture repair  (5) s/p successful closed reduction of left pinky PIP dislocation with delfin splint  (6) left clavicle fracture  (7) left nasal bone and left orbital rim fractures  (8) sternal fracture  Incidental findings: right renal lesion. Pt's  was notified and copy of the report given to him.     The patient was admitted to Trauma Surgery under SICU care.  Ortho was consulted for L. iliac wing fx, sternal fx, L. clavicle fx- recommended WBAT of the affected left lower extremity with walker, WBAT L shoulder, pendulum exercises okay, no lifting, sling for comfort, Encourage ROM of elbow/wrist/hand, PT/OT consult, NO urgent orthopedic surgical intervention at this time.    Neurosurgery consulted- recommended repeat CTH, which was stable.     PRS was consulted for non displaced left superior orbital rim and non displaced left nasal bone fracture--> No indication for operative fixation of these non displaced fractures; recommend ice to face to aid in swelling and analgesia.    She was placed on hemorrhage watch in the SICU given pelvic fracture and surrounding hematoma.  Behavior health was consulted for delirium.    Hand was consulted for L. pinky PIP dislocation- s/p closed reduction, rec Cont delfin taping for now.    - CTH stable  - Added home lexapro  - Added NS @ 50cc/hr to supplement poor PO intake  - Lactate cleared (1.9)    - Transfused 1u pRBC, Hct 20.9 -> 26.9  - Went into SVT, given adenosine x1 and started on metoprolol 25mg q12hr  - Added oxycodone 5mg q4hrs prn for increased pain control  - PO intake improved, IVL    - EP was consulted for SVT, recc continue lopressor 25mg   Metoprolol PO increased from 25mg q12hrs to 25mg q6hrs.   - Added salt tabs 1g q8hrs and started on NS @ 50cc/hr for hyponatremia  - Avila discontinued, passed TOV  - Added lovenox  - Cervical collar cleared by confrontational exam     - Patient transferred from SICU to surgical floor in stable condition.  Discharge planning to acute rehab.     Stable from neurologic perspective consistent with mild TBI  Multiple rib fractures - breathing comfortable with multimodal pain control    On day of discharge, the patient was tolerating diet, working with PT well and pain controlled. The patient was medically stable for discharge to acute rehab with instructions for outpatient follow up 21. 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    C Section    Abnormality, eye  h/o left eye vitrectomy    S/P D&amp;C (status post dilation and curettage)    H/O left mastectomy        MEDICATIONS  (STANDING):  bethanechol 10 milliGRAM(s) Oral two times a day  enoxaparin Injectable 40 milliGRAM(s) SubCutaneous every 24 hours  escitalopram 10 milliGRAM(s) Oral daily  folic acid 1 milliGRAM(s) Oral daily  levothyroxine 112 MICROGram(s) Oral daily  lidocaine   Patch 1 Patch Transdermal daily  metoprolol succinate ER 50 milliGRAM(s) Oral daily  modafinil 50 milliGRAM(s) Oral <User Schedule>  multivitamin 1 Tablet(s) Oral daily  nystatin Powder 1 Application(s) Topical two times a day  oxyCODONE    IR 5 milliGRAM(s) Oral <User Schedule>  polyethylene glycol 3350 17 Gram(s) Oral daily  senna 2 Tablet(s) Oral at bedtime  tamsulosin 0.4 milliGRAM(s) Oral at bedtime  traZODone 50 milliGRAM(s) Oral at bedtime  zinc oxide 40% Ointment 1 Application(s) Topical two times a day    MEDICATIONS  (PRN):  acetaminophen   Tablet .. 650 milliGRAM(s) Oral every 6 hours PRN Temp greater or equal to 38C (100.4F), Mild Pain (1 - 3)      Allergies    Tapazole (Hives)    Intolerances          VITALS  65y  Vital Signs Last 24 Hrs  T(C): 36.4 (25 May 2021 08:00), Max: 36.8 (24 May 2021 20:24)  T(F): 97.5 (25 May 2021 08:00), Max: 98.2 (24 May 2021 20:24)  HR: 66 (25 May 2021 08:00) (66 - 85)  BP: 100/65 (25 May 2021 08:00) (100/65 - 120/79)  BP(mean): --  RR: 12 (25 May 2021 08:00) (12 - 16)  SpO2: 95% (25 May 2021 08:00) (95% - 97%)  Daily     Daily Weight in k.2 (24 May 2021 22:27)        RECENT LABS:                          12.4   6.79  )-----------( 180      ( 24 May 2021 06:45 )             38.3     05-24    141  |  105  |  22  ----------------------------<  108<H>  4.1   |  28  |  0.64    Ca    8.9      24 May 2021 06:45    TPro  6.7  /  Alb  3.2<L>  /  TBili  0.5  /  DBili  x   /  AST  17  /  ALT  24  /  AlkPhos  237<H>  05-24    LIVER FUNCTIONS - ( 24 May 2021 06:45 )  Alb: 3.2 g/dL / Pro: 6.7 g/dL / ALK PHOS: 237 U/L / ALT: 24 U/L / AST: 17 U/L / GGT: x                   CAPILLARY BLOOD GLUCOSE            Review of Systems:   · Additional ROS	 Patient looks slightly brighter today. Reports sleeping well last night (trazodone increased to 50 mg, modafanil reduced 50 mg) avila in place, draining clear yellow urine. Patient would like to have voiding trial when asked    Ate most of meal tray. Denies any H/A or dizziness    Physical Exam:   · Constitutional	detailed exam  · Constitutional Details	no distress  · Constitutional Comments	 GCS=14 ([id number])       right facial ecchymoses./frontal area resolving, yellowing, periorbital ecchymoses resolving  left forehead laceration with steristrips, no oozing, no TTP    ·	HEENT	  EOMI, conjunctiva clear  	  · Respiratory	detailed exam  · Respiratory Details	breath sounds equal; respirations non-labored; reduced BS bases  · Cardiovascular	detailed exam  · Cardiovascular Details	regular rate and rhythm  · Gastrointestinal	detailed exam  · GI Normal	nontender; no distention; bowel sounds normal  	no suprapubic pain  · Extremities	calves soft, NT no erythema or warmth  	  	  	avila to leg bag, clear yellow urine, no sediment, no mucous or hematuria

## 2021-05-26 LAB — SURGICAL PATHOLOGY STUDY: SIGNIFICANT CHANGE UP

## 2021-05-26 PROCEDURE — 99232 SBSQ HOSP IP/OBS MODERATE 35: CPT

## 2021-05-26 RX ORDER — TAMOXIFEN CITRATE 20 MG/1
10 TABLET, FILM COATED ORAL DAILY
Refills: 0 | Status: DISCONTINUED | OUTPATIENT
Start: 2021-05-26 | End: 2021-05-27

## 2021-05-26 RX ADMIN — Medication 10 MILLIGRAM(S): at 18:26

## 2021-05-26 RX ADMIN — NYSTATIN CREAM 1 APPLICATION(S): 100000 CREAM TOPICAL at 18:26

## 2021-05-26 RX ADMIN — Medication 50 MILLIGRAM(S): at 21:14

## 2021-05-26 RX ADMIN — OXYCODONE HYDROCHLORIDE 5 MILLIGRAM(S): 5 TABLET ORAL at 07:35

## 2021-05-26 RX ADMIN — LIDOCAINE 1 PATCH: 4 CREAM TOPICAL at 23:30

## 2021-05-26 RX ADMIN — TAMSULOSIN HYDROCHLORIDE 0.4 MILLIGRAM(S): 0.4 CAPSULE ORAL at 21:14

## 2021-05-26 RX ADMIN — Medication 112 MICROGRAM(S): at 05:32

## 2021-05-26 RX ADMIN — ZINC OXIDE 1 APPLICATION(S): 200 OINTMENT TOPICAL at 05:32

## 2021-05-26 RX ADMIN — LIDOCAINE 1 PATCH: 4 CREAM TOPICAL at 00:30

## 2021-05-26 RX ADMIN — LIDOCAINE 1 PATCH: 4 CREAM TOPICAL at 19:30

## 2021-05-26 RX ADMIN — POLYETHYLENE GLYCOL 3350 17 GRAM(S): 17 POWDER, FOR SOLUTION ORAL at 11:59

## 2021-05-26 RX ADMIN — ENOXAPARIN SODIUM 40 MILLIGRAM(S): 100 INJECTION SUBCUTANEOUS at 06:23

## 2021-05-26 RX ADMIN — Medication 10 MILLIGRAM(S): at 05:32

## 2021-05-26 RX ADMIN — Medication 50 MILLIGRAM(S): at 05:31

## 2021-05-26 RX ADMIN — Medication 1 TABLET(S): at 11:59

## 2021-05-26 RX ADMIN — MODAFINIL 50 MILLIGRAM(S): 200 TABLET ORAL at 06:23

## 2021-05-26 RX ADMIN — ZINC OXIDE 1 APPLICATION(S): 200 OINTMENT TOPICAL at 18:26

## 2021-05-26 RX ADMIN — LIDOCAINE 1 PATCH: 4 CREAM TOPICAL at 11:59

## 2021-05-26 RX ADMIN — NYSTATIN CREAM 1 APPLICATION(S): 100000 CREAM TOPICAL at 05:32

## 2021-05-26 RX ADMIN — ESCITALOPRAM OXALATE 10 MILLIGRAM(S): 10 TABLET, FILM COATED ORAL at 11:59

## 2021-05-26 RX ADMIN — SENNA PLUS 2 TABLET(S): 8.6 TABLET ORAL at 21:14

## 2021-05-26 RX ADMIN — OXYCODONE HYDROCHLORIDE 5 MILLIGRAM(S): 5 TABLET ORAL at 07:36

## 2021-05-26 RX ADMIN — Medication 1 MILLIGRAM(S): at 11:59

## 2021-05-26 NOTE — PROGRESS NOTE ADULT - SUBJECTIVE AND OBJECTIVE BOX
Patient is a 65y old  Female who presents with a chief complaint of SAH and fractures of left-sided ribs, left iliac wing with hematoma, S/P closed reduction of left pinky PIP dislocation after trauma (25 May 2021 11:06)      SUBJECTIVE / OVERNIGHT EVENTS:  Pt seen and examined at bedside. No acute events overnight.  Pt denies cp, palpitations, sob, abd pain, N/V, fever, chills.    ROS:  All other review of systems negative    Allergies    Tapazole (Hives)    Intolerances        MEDICATIONS  (STANDING):  bethanechol 10 milliGRAM(s) Oral two times a day  enoxaparin Injectable 40 milliGRAM(s) SubCutaneous every 24 hours  escitalopram 10 milliGRAM(s) Oral daily  folic acid 1 milliGRAM(s) Oral daily  levothyroxine 112 MICROGram(s) Oral daily  lidocaine   Patch 1 Patch Transdermal daily  metoprolol succinate ER 50 milliGRAM(s) Oral daily  modafinil 50 milliGRAM(s) Oral <User Schedule>  multivitamin 1 Tablet(s) Oral daily  nystatin Powder 1 Application(s) Topical two times a day  oxyCODONE    IR 5 milliGRAM(s) Oral <User Schedule>  polyethylene glycol 3350 17 Gram(s) Oral daily  senna 2 Tablet(s) Oral at bedtime  tamsulosin 0.4 milliGRAM(s) Oral at bedtime  traZODone 50 milliGRAM(s) Oral at bedtime  zinc oxide 40% Ointment 1 Application(s) Topical two times a day    MEDICATIONS  (PRN):  acetaminophen   Tablet .. 650 milliGRAM(s) Oral every 6 hours PRN Temp greater or equal to 38C (100.4F), Mild Pain (1 - 3)      Vital Signs Last 24 Hrs  T(C): 36.7 (26 May 2021 10:04), Max: 36.8 (25 May 2021 19:39)  T(F): 98.1 (26 May 2021 10:04), Max: 98.3 (25 May 2021 19:39)  HR: 74 (26 May 2021 10:04) (71 - 78)  BP: 112/76 (26 May 2021 10:04) (106/67 - 131/76)  BP(mean): --  RR: 14 (26 May 2021 10:04) (14 - 14)  SpO2: 98% (26 May 2021 10:04) (94% - 98%)  CAPILLARY BLOOD GLUCOSE        I&O's Summary    25 May 2021 07:01  -  26 May 2021 07:00  --------------------------------------------------------  IN: 0 mL / OUT: 750 mL / NET: -750 mL        PHYSICAL EXAM:  GENERAL: NAD, well-developed  EYES: periorbital ecchymosis   CHEST/LUNG: Clear to auscultation bilaterally; No wheeze, nonlabored breathing  HEART: Regular rate and rhythm; No murmurs, rubs, or gallops  ABDOMEN: Soft, Nontender, Nondistended; Bowel sounds present  EXTREMITIES:  2+ Peripheral Pulses, No clubbing, cyanosis, or edema. +LUE in Sling  NEUROLOGY: AAOx3  PSYCH: calm, appropriate mood    LABS:                    RADIOLOGY & ADDITIONAL TESTS:  Results Reviewed:   Imaging Personally Reviewed:  Electrocardiogram Personally Reviewed:    COORDINATION OF CARE:  Care Discussed with Consultants/Other Providers [Y/N]:  Prior or Outpatient Records Reviewed [Y/N]:

## 2021-05-26 NOTE — PROGRESS NOTE ADULT - SUBJECTIVE AND OBJECTIVE BOX
Patient is a 65y old  Female who presents with a chief complaint of SAH and fractures of left-sided ribs, left iliac wing with hematoma, S/P closed reduction of left pinky PIP dislocation after trauma (26 May 2021 11:05)      HPI:  65F PMHx breast cancer s/p mastectomy, HTN, hypothyroidism, not on full anticoagulation/antiplatelets per EMS, presented to [facility] 4/19/21as a level 2 trauma activation after a fall from a flight of stairs. Patient only able to say "ow" in trauma bay, unable to provide further history. However per son, normal mentation at baseline. In the trauma bay, airway was intact with bilateral breath sounds, O2 saturation 100% on room air. Initially blood pressure was 140s systolic however on repeat 90s. NS@100 initiated. Secondary significant for left forehead laceration, left chest wall tenderness, left pelvic tenderness.     Ms. Arthur was hypotensive in the trauma bay and was transferred to the Surgical ICU after imaging was obtained. Her injury list is below.  (1) SAH, bilateral: stable on repeat imaging. Seven day course of Keppra for post-traumatic seizure prophylaxis.  (2) left 3-8 and right 7th rib fractures with occult left pneumothorax: pain control  (3) left iliac wing fracture with hematoma: required transfusion early in her ICU course, after which CBC were stable  (4) left forehead laceration s/p suture repair  (5) s/p successful closed reduction of left pinky PIP dislocation with delfin splint  (6) left clavicle fracture  (7) left nasal bone and left orbital rim fractures  (8) sternal fracture  Incidental findings: right renal lesion. Pt's  was notified and copy of the report given to him.     The patient was admitted to Trauma Surgery under SICU care.  Ortho was consulted for L. iliac wing fx, sternal fx, L. clavicle fx- recommended WBAT of the affected left lower extremity with walker, WBAT L shoulder, pendulum exercises okay, no lifting, sling for comfort, Encourage ROM of elbow/wrist/hand, PT/OT consult, NO urgent orthopedic surgical intervention at this time.    Neurosurgery consulted- recommended repeat CTH, which was stable.     PRS was consulted for non displaced left superior orbital rim and non displaced left nasal bone fracture--> No indication for operative fixation of these non displaced fractures; recommend ice to face to aid in swelling and analgesia.    She was placed on hemorrhage watch in the SICU given pelvic fracture and surrounding hematoma.  Behavior health was consulted for delirium.    Hand was consulted for L. pinky PIP dislocation- s/p closed reduction, rec Cont delfin taping for now.    4/21- CTH stable  - Added home lexapro  - Added NS @ 50cc/hr to supplement poor PO intake  - Lactate cleared (1.9)    4/22- Transfused 1u pRBC, Hct 20.9 -> 26.9  - Went into SVT, given adenosine x1 and started on metoprolol 25mg q12hr  - Added oxycodone 5mg q4hrs prn for increased pain control  - PO intake improved, IVL    4/23- EP was consulted for SVT, recc continue lopressor 25mg   Metoprolol PO increased from 25mg q12hrs to 25mg q6hrs.   - Added salt tabs 1g q8hrs and started on NS @ 50cc/hr for hyponatremia  - Spangler discontinued, passed TOV  - Added lovenox  - Cervical collar cleared by confrontational exam     4/24- Patient transferred from SICU to surgical floor in stable condition.  Discharge planning to acute rehab.     Stable from neurologic perspective consistent with mild TBI  Multiple rib fractures - breathing comfortable with multimodal pain control    On day of discharge, the patient was tolerating diet, working with PT well and pain controlled. The patient was medically stable for discharge to acute rehab with instructions for outpatient follow up 4/27/21. 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    C Section    Abnormality, eye  h/o left eye vitrectomy    S/P D&amp;C (status post dilation and curettage)    H/O left mastectomy        MEDICATIONS  (STANDING):  bethanechol 10 milliGRAM(s) Oral two times a day  enoxaparin Injectable 40 milliGRAM(s) SubCutaneous every 24 hours  escitalopram 10 milliGRAM(s) Oral daily  folic acid 1 milliGRAM(s) Oral daily  levothyroxine 112 MICROGram(s) Oral daily  lidocaine   Patch 1 Patch Transdermal daily  metoprolol succinate ER 50 milliGRAM(s) Oral daily  modafinil 50 milliGRAM(s) Oral <User Schedule>  multivitamin 1 Tablet(s) Oral daily  nystatin Powder 1 Application(s) Topical two times a day  oxyCODONE    IR 5 milliGRAM(s) Oral <User Schedule>  polyethylene glycol 3350 17 Gram(s) Oral daily  senna 2 Tablet(s) Oral at bedtime  tamsulosin 0.4 milliGRAM(s) Oral at bedtime  traZODone 50 milliGRAM(s) Oral at bedtime  zinc oxide 40% Ointment 1 Application(s) Topical two times a day    MEDICATIONS  (PRN):  acetaminophen   Tablet .. 650 milliGRAM(s) Oral every 6 hours PRN Temp greater or equal to 38C (100.4F), Mild Pain (1 - 3)      Allergies    Tapazole (Hives)    Intolerances          VITALS  65y  Vital Signs Last 24 Hrs  T(C): 36.7 (26 May 2021 10:04), Max: 36.8 (25 May 2021 19:39)  T(F): 98.1 (26 May 2021 10:04), Max: 98.3 (25 May 2021 19:39)  HR: 74 (26 May 2021 10:04) (71 - 78)  BP: 112/76 (26 May 2021 10:04) (106/67 - 131/76)  BP(mean): --  RR: 14 (26 May 2021 10:04) (14 - 14)  SpO2: 98% (26 May 2021 10:04) (94% - 98%)  Daily     Daily         RECENT LABS:                      CAPILLARY BLOOD GLUCOSE            Review of Systems:   · Additional ROS	 Patient complains of poor sleep again last night. denies any pain, no H/A or dizziness. Spangler was removed yesterday and has had peristent retention, requiring SC -600 ml so may be contributing to restlessness. Had good night's sleep the night before on increased trazodone    Physical Exam:   · Constitutional	detailed exam  · Constitutional Details	no distress  · Constitutional Comments	 GCS=14 (E4V4M6)     right facial ecchymoses./frontal area continues to fade, improve  left forehead laceration with steristrips, C/D/I    	  	  · Respiratory	detailed exam  · Respiratory Details	breath sounds equal; respirations non-labored; reduced BS bases  · Cardiovascular	detailed exam  · Cardiovascular Details	regular rate and rhythm  · Gastrointestinal	detailed exam  · GI Normal	nontender; no distention; bowel sounds normal  	no suprapubic fullness or TTP  · Extremities	calves soft, NT no erythema or warmth

## 2021-05-26 NOTE — CHART NOTE - NSCHARTNOTEFT_GEN_A_CORE
Nutrition Follow Up Note  Hospital Course (Per Electronic Medical Record): 64 yo woman with PMHx breast cancer s/p mastectomy, HTN, Hypothyroidism admitted to [facility] after hospital course at [facility] after a fall from a flight of stairs, sustaining SAH, left-sided rib fractures, left iliac wing fracture with hematoma, left forehead laceration, s/p successful closed reduction of left pinky PIP dislocation, Acute L3 compression fracture, 4/29 admitted to [facility] unit    Source: Medical Record [X] Patient [X] Family [ ]         Diet: Regular, kosher, Ensure Enlive TID  Pt tolerating diet, reports her appetite is "good". Pt only able to answer simple sentences with single/a few - word answers based on observation. Per discussion with PCA, pt had good PO intake at breakfast this AM, consumed breakfast, Ensure supplement, but did not drink orange juice.    Current Weight: 129.36 lbs (5/26) weighed today by this writer during interview  125.8 lbs (5/23)  130 lbs (5/15)  130.2 lbs (5/14)  131.8 lbs (5/9)  135.1 lbs (4/30)  133.1 lbs (4/28)    Pertinent Medications: MEDICATIONS  (STANDING):  bethanechol 10 milliGRAM(s) Oral two times a day  enoxaparin Injectable 40 milliGRAM(s) SubCutaneous every 24 hours  escitalopram 10 milliGRAM(s) Oral daily  folic acid 1 milliGRAM(s) Oral daily  levothyroxine 112 MICROGram(s) Oral daily  lidocaine   Patch 1 Patch Transdermal daily  metoprolol succinate ER 50 milliGRAM(s) Oral daily  modafinil 50 milliGRAM(s) Oral <User Schedule>  multivitamin 1 Tablet(s) Oral daily  nystatin Powder 1 Application(s) Topical two times a day  oxyCODONE    IR 5 milliGRAM(s) Oral <User Schedule>  polyethylene glycol 3350 17 Gram(s) Oral daily  senna 2 Tablet(s) Oral at bedtime  tamsulosin 0.4 milliGRAM(s) Oral at bedtime  traZODone 50 milliGRAM(s) Oral at bedtime  zinc oxide 40% Ointment 1 Application(s) Topical two times a day    MEDICATIONS  (PRN):  acetaminophen   Tablet .. 650 milliGRAM(s) Oral every 6 hours PRN Temp greater or equal to 38C (100.4F), Mild Pain (1 - 3)      Pertinent Labs:  05-24 Na141 mmol/L Glu 108 mg/dL<H> K+ 4.1 mmol/L Cr  0.64 mg/dL BUN 22 mg/dL 05-24 Alb 3.2 g/dL<L>        Skin: bruised, surgical incision L forehead, incontinence associated dermatitis per nursing flow sheets    Edema: none per nursing flow sheets    Last BM: on 5/26 per nursing flow sheets    Estimated Needs:   [X] No Change since Previous Assessment  [ ] Recalculated:     Previous Nutrition Diagnosis:   Inadequate oral intake  Moderate malnutrition    Nutrition Diagnosis is [X] Ongoing  - pt being encouraged during meals, on Ensure Enlive TID, vitamins (mvi, folic acid)  [ ] Resolved   [ ] Not Applicable      New Nutrition Diagnosis: [X] Not Applicable  [ ] Inadequate Protein Energy Intake   [ ] Inadequate Oral Intake   [ ] Excessive Energy Intake   [ ] Increased Nutrient Needs   [ ] Obesity   [ ] Altered GI Function   [ ] Unintended Weight Loss   [ ] Food & Nutrition Related Knowledge Deficit  [ ] Limited Adherence to nutrition related recommendations   [ ] Malnutrition      Interventions:   1. Continue with current diet  2. Obtain weight at regular intervals; suggest 3x/week  3. Encourage and monitor PO intake  4. Continue to honor food preferences     Monitoring & Evaluation:   [X] Weights   [X] PO Intake   [X] Follow Up (Per Protocol)  [X] Tolerance to Diet Prescription   [X] Other: Labs    RD Remains Available.  Diana Sandhu RD

## 2021-05-26 NOTE — PROGRESS NOTE ADULT - ASSESSMENT
JONATHAN CHILD is a 65y with PMHx breast Ca S/P mastectomy, HTN, hypothyroidism who presented to [facility] 4/19/21 s/p fall down a flight of stairs with bilateral frontoparietal SAH; L nasal bone and L orbital rim fractures; sternal fracture; L 3-8, R 7th rib fractures;  L pneumothorax, L iliac wing fracture with hematoma S/P 1U pRBCs; left clavicle fx, left V finger PIP dislocation s/p CR. She is WBAT Left LE, NWB Left UE.     # t SAH  - Avoid NSAIDs  - Modafanil reduced to 50 mg due to restlessness, insomnia 5/24  - Cont comprehensive rehab program, PT/OT/SLP 3 hours a day, 5 days a week\- F/u neurosurgery, Dr. Vikas Aquino, upon discharge  - Precautions: falls, sternal , WB as above, AMS, cardiac, h/o breast CA    # S/P Fall 5/17/21 with head strike and lacerations left lateral brow and forehead  - Stat CTH, CT cervical, CT C/A/P no acute findings 5/17.  - Spoke with Dr. Wheatley ([facility]) via transfer center, imaging reviewed and no indication for acute transfer.   - Plastics consulted and appreciated 5/17, and forehead laceration x 2 sutured  ·	steri strips are to be kept clean and dry and not to be removed before 6/2/21.  ·	from 5/34 -6/2/21, call plastic surgery on call for any emergency  - Repeat Head CT 12 hours post on 5/18 unremarkable  - Lovenox restarted 5/19. Hgb 12.4 stable 5/24  - continue 2:1 supervision, reassessed 5/26    # Multiple fractures. WBAT LLE, NWB LUE   - OK ROM exercises for L elbow, wrist, hand.  L shoulder pendulum exercises. Sling to LUE for comfort. No lifting more than 1-3 pounds for 6 weeks. Will follow with ortho at that time for upgrade in WB/ROM (5/31)  - LSO brace whenever OOB. (replaced 5/14)  -  buddy tape dc'd for L 5th finger dislocation (per Dr. Choe 5/10)  - Continue Lovenox for 6 weeks total (through 6/3)  - follow up on dc:  ·	plastic surgeon Dr. Nell Choe, for facial laceration, L pinky dislocation, L nasal bone and orbital rim fractures.   ·	ortho, Dr. Reji Cardoza, for L clavicle and L pelvic wing fractures  ·	surgery, Dr. Dino Peña, for rib fractures    # Acute Hypoxic Respiratory Failure likely due to Acute B/L rib fractures, and small pneumothorax  - Incentive spirometry. Breathing exercises  - stable on RA    # Elevated Alk Phos likely 2/2 bone fractures  - Hepatic sono 5/7: Multiple gallstones, without evidence for gallbladder wall thickening or pericholecystic fluid.  -  AST  17  /  ALT  24  /  AlkPhos  237<H>  05-24 improved  - CMP 5/27    # H/o SVT  - Continue with toprol XL, reduced to 50 mg daily 5/24  - Can follow up in 3 months with EP Dr. Hernández [phone]. (elective ablation)    #R kidney lesion  - F/u Dr. Herrera outpatient or PCP    #Hypothyroidism  - Continue Synthroid 112mcg daily    # Depression: stable/improved  - Psychiatry f/u 5/11 read and appreciated:   ·	Continue Lexapro 10mg  ·	Continue Modafinil 50 mg, reduced insomnia and restlessness on lower dose  ·	Trazodone increased to 50 mg 5/24, improved sleep    # insomnia, increased 5/26  - currently on trazodone and modafanil reduced. Pain better controlled  - likely due to urinary retention and discomfort. continue bladder scan, try to empty bladder prior to sleep, discussed with staff  - if retention persists at current volumes, will replace avila 5/27, discussed with hospitalist and team    # Pain  - Tylenol  - Lidoderm to L chest wall  - Scheduled Oxycodone reduced to 5 mg, improved    # GI/Bowel:  - Senna QHS, Miralax Daily    # /Bladder:   - Avila placed 4/29/21 for urinary retention. Failed TOV 5/6   - Urine Cx: E. coli: started on Nitrofurantoin, switched to CTX for 3 day course, finished 5/18  - UA 5/17 negative  -  consult 5/18 appreciated  ·	Continue Bethanechol 10 bid  ·	Continue Flomax 0.4 mg daily  ·	continue TOV, to replace avila if no improvement in PVRs (500-600 ml 5/26)    # Diet  - regular solids thin liquids  - 1:1 supervision for meals    # DVT ppx:  - Lovenox  - SCDs    # Case discussed in IDT 5/26/21:  - reduced balance, safety and insight. Requires supervision/set up for eating, min assist grooming and UB dressing, mod LB dressing, min assist toileting, CG transfers with cues, ambulates 60 feet WBQC min assist  - Goal: supervision grooming, bathing LBD, Mark shower transfers, 8 step with 1 HR and min assist. May upgrade goals if allowed WBAT LUE and can use walker  - Patient for JULI placement      #LABS  bladder scan  2:1 observation, renewed 5/26  CBC BMP 5/27      ---------------  Outpatient Follow-up (Specialty/Name of physician):  Silvina Herrera  INTERNAL MEDICINE  [address] [place] [address]  Phone: [phone]  Fax: [phone]  Follow Up Time: 1 week    Vikas Aquino)  Neurosurgery  [address] [facility], [address] [place] [address]  Phone: [phone]  Fax: [phone]  Follow Up Time: 2 weeks    Nell Choe)  Plastic Surgery  [address] [facility], [address]  [place], [place] [address]  Phone: [phone]  Fax: [phone]  Follow Up Time: 1 week    Reji Cardoza)  Orthopaedic Surgery  [address] [facility] [address]  [place] [address]  Phone: [phone]  Fax: [phone]  Follow Up Time: Routine    Dino Peña)  Surgery; Surgical Critical Care  [address] [facility], [address] [address]  Phone: [phone]  Fax: [phone]  Follow Up Time: Routine    Morgan Hernández; PhD)  Cardiac Electrophysiology; Cardiovascular Disease; Internal Medicine  [facility] - Dept of Cardiology, [address] [place] [address]  Phone: [phone]  Fax: [phone]   JONATHAN CHILD is a 65y with PMHx breast Ca S/P mastectomy, HTN, hypothyroidism who presented to [facility] 4/19/21 s/p fall down a flight of stairs with bilateral frontoparietal SAH; L nasal bone and L orbital rim fractures; sternal fracture; L 3-8, R 7th rib fractures;  L pneumothorax, L iliac wing fracture with hematoma S/P 1U pRBCs; left clavicle fx, left V finger PIP dislocation s/p CR. She is WBAT Left LE, NWB Left UE.     # t SAH  - Avoid NSAIDs  - Modafanil reduced to 50 mg due to restlessness, insomnia 5/24  - Cont comprehensive rehab program, PT/OT/SLP 3 hours a day, 5 days a week\- F/u neurosurgery, Dr. Vikas Aquino, upon discharge  - Precautions: falls, sternal , WB as above, AMS, cardiac, h/o breast CA    # S/P Fall 5/17/21 with head strike and lacerations left lateral brow and forehead  - Stat CTH, CT cervical, CT C/A/P no acute findings 5/17.  - Spoke with Dr. Wheatley ([facility]) via transfer center, imaging reviewed and no indication for acute transfer.   - Plastics consulted and appreciated 5/17, and forehead laceration x 2 sutured  ·	steri strips are to be kept clean and dry and not to be removed before 6/2/21.  ·	from 5/34 -6/2/21, call plastic surgery on call for any emergency  - Repeat Head CT 12 hours post on 5/18 unremarkable  - Lovenox restarted 5/19. Hgb 12.4 stable 5/24  - continue 2:1 supervision, reassessed 5/26    # Multiple fractures. WBAT LLE, NWB LUE   - OK ROM exercises for L elbow, wrist, hand.  L shoulder pendulum exercises. Sling to LUE for comfort. No lifting more than 1-3 pounds for 6 weeks. Will follow with ortho at that time for upgrade in WB/ROM (5/31)  - LSO brace whenever OOB. (replaced 5/14)  -  buddy tape dc'd for L 5th finger dislocation (per Dr. Choe 5/10)  - Continue Lovenox for 6 weeks total (through 6/3)  - follow up on dc:  ·	plastic surgeon Dr. Nell Choe, for facial laceration, L pinky dislocation, L nasal bone and orbital rim fractures.   ·	ortho, Dr. Reji Cardoza, for L clavicle and L pelvic wing fractures  ·	surgery, Dr. Dino Peña, for rib fractures    # Acute Hypoxic Respiratory Failure likely due to Acute B/L rib fractures, and small pneumothorax  - Incentive spirometry. Breathing exercises  - stable on RA    # Elevated Alk Phos likely 2/2 bone fractures  - Hepatic sono 5/7: Multiple gallstones, without evidence for gallbladder wall thickening or pericholecystic fluid.  -  AST  17  /  ALT  24  /  AlkPhos  237<H>  05-24 improved  - CMP 5/27    # H/o SVT  - Continue with toprol XL, reduced to 50 mg daily 5/24  - Can follow up in 3 months with EP Dr. Hernández [phone]. (elective ablation)    #R kidney lesion  - F/u Dr. Herrera outpatient or PCP    #Hypothyroidism  - Continue Synthroid 112mcg daily    # h/o breast CA  - was previously on tamoxifen, stopped when admitted following fall  - discussed with hospitalist, resume 10 mg daily 5/26    # Depression: stable/improved  - Psychiatry f/u 5/11 read and appreciated:   ·	Continue Lexapro 10mg  ·	Continue Modafinil 50 mg, reduced insomnia and restlessness on lower dose  ·	Trazodone increased to 50 mg 5/24, improved sleep    # insomnia, increased 5/26  - currently on trazodone and modafanil reduced. Pain better controlled  - likely due to urinary retention and discomfort. continue bladder scan, try to empty bladder prior to sleep, discussed with staff  - if retention persists at current volumes, will replace avila 5/27, discussed with hospitalist and team    # Pain  - Tylenol  - Lidoderm to L chest wall  - Scheduled Oxycodone reduced to 5 mg, improved    # GI/Bowel:  - Senna QHS, Miralax Daily    # /Bladder:   - Avila placed 4/29/21 for urinary retention. Failed TOV 5/6   - Urine Cx: E. coli: started on Nitrofurantoin, switched to CTX for 3 day course, finished 5/18  - UA 5/17 negative  -  consult 5/18 appreciated  ·	Continue Bethanechol 10 bid  ·	Continue Flomax 0.4 mg daily  ·	continue TOV, to replace avila if no improvement in PVRs (500-600 ml 5/26)    # Diet  - regular solids thin liquids  - 1:1 supervision for meals    # DVT ppx:  - Lovenox  - SCDs    # Case discussed in IDT 5/26/21:  - reduced balance, safety and insight. Requires supervision/set up for eating, min assist grooming and UB dressing, mod LB dressing, min assist toileting, CG transfers with cues, ambulates 60 feet WBQC min assist  - Goal: supervision grooming, bathing LBD, Mark shower transfers, 8 step with 1 HR and min assist. May upgrade goals if allowed WBAT LUE and can use walker  - Patient for JULI placement      #LABS  bladder scan  2:1 observation, renewed 5/26  CBC BMP 5/27      ---------------  Outpatient Follow-up (Specialty/Name of physician):  Silvina Herrera  INTERNAL MEDICINE  [address] [place] [address]  Phone: [phone]  Fax: [phone]  Follow Up Time: 1 week    Vikas Aquino)  Neurosurgery  [address] [place] [address], [address] [place] [address]  Phone: [phone]  Fax: [phone]  Follow Up Time: 2 weeks    Nell Choe)  Plastic Surgery  [address] [facility], [address]  [place], [place] [id number]  Phone: [phone]  Fax: [phone]  Follow Up Time: 1 week    Reji Cardoza)  Orthopaedic Surgery  [address] [facility], [address]  [place] [address]  Phone: [phone]  Fax: [phone]  Follow Up Time: Routine    Dino Peña)  Surgery; Surgical Critical Care  [address] [facility], [facility] [address], [place] [address]  Phone: [phone]  Fax: [phone]  Follow Up Time: Routine    Morgan Hernández; PhD)  Cardiac Electrophysiology; Cardiovascular Disease; Internal Medicine  [facility] - Dept of Cardiology, [address] [place] [address]  Phone: [phone]  Fax: [phone]

## 2021-05-26 NOTE — PROGRESS NOTE ADULT - ASSESSMENT
66 yo woman with PMHx breast cancer s/p mastectomy, HTN, Hypothyroidism admitted to [facility] after hospital course at [facility] after a fall from a flight of stairs, sustaining SAH, left-sided rib fractures, left iliac wing fracture with hematoma, left forehead laceration, s/p successful closed reduction of left pinky PIP dislocation, Acute L3 compression fracture, 4/29 admitted to [facility]    #Fall  #SAH  #Multiple fractures  -continue comprehensive rehabilitation program per PMR  -continue safety monitoring     #Urinary retention  #Constipation  -continue Flomax / urecholine  -will need Spangler when discharged if retention persists  -continue bowel regimen    #Normocytic Anemia with folic acid deficiency - stable  - monitor H/H    #Elevated ALKPHOS  -Due to multiple fractures   -Downtrending  -avoid hepatotoxins    #Episodes of SVT   -Continue Metoprolol  -Can follow up in 3 months with EP Dr. Hernández [phone]. (elective ablation)    #Hypothyroidism  -Continue Synthroid    #VTE PPX   -Lovenox

## 2021-05-27 DIAGNOSIS — N31.9 NEUROMUSCULAR DYSFUNCTION OF BLADDER, UNSPECIFIED: ICD-10-CM

## 2021-05-27 LAB
ALBUMIN SERPL ELPH-MCNC: 3.1 G/DL — LOW (ref 3.3–5)
ALP SERPL-CCNC: 198 U/L — HIGH (ref 40–120)
ALT FLD-CCNC: 20 U/L — SIGNIFICANT CHANGE UP (ref 10–45)
ANION GAP SERPL CALC-SCNC: 9 MMOL/L — SIGNIFICANT CHANGE UP (ref 5–17)
AST SERPL-CCNC: 16 U/L — SIGNIFICANT CHANGE UP (ref 10–40)
BASOPHILS # BLD AUTO: 0.03 K/UL — SIGNIFICANT CHANGE UP (ref 0–0.2)
BASOPHILS NFR BLD AUTO: 0.5 % — SIGNIFICANT CHANGE UP (ref 0–2)
BILIRUB SERPL-MCNC: 0.4 MG/DL — SIGNIFICANT CHANGE UP (ref 0.2–1.2)
BUN SERPL-MCNC: 22 MG/DL — SIGNIFICANT CHANGE UP (ref 7–23)
CALCIUM SERPL-MCNC: 9 MG/DL — SIGNIFICANT CHANGE UP (ref 8.4–10.5)
CHLORIDE SERPL-SCNC: 110 MMOL/L — HIGH (ref 96–108)
CO2 SERPL-SCNC: 25 MMOL/L — SIGNIFICANT CHANGE UP (ref 22–31)
CREAT SERPL-MCNC: 0.62 MG/DL — SIGNIFICANT CHANGE UP (ref 0.5–1.3)
EOSINOPHIL # BLD AUTO: 0.09 K/UL — SIGNIFICANT CHANGE UP (ref 0–0.5)
EOSINOPHIL NFR BLD AUTO: 1.4 % — SIGNIFICANT CHANGE UP (ref 0–6)
GLUCOSE SERPL-MCNC: 97 MG/DL — SIGNIFICANT CHANGE UP (ref 70–99)
HCT VFR BLD CALC: 38.4 % — SIGNIFICANT CHANGE UP (ref 34.5–45)
HGB BLD-MCNC: 12.3 G/DL — SIGNIFICANT CHANGE UP (ref 11.5–15.5)
IMM GRANULOCYTES NFR BLD AUTO: 0.3 % — SIGNIFICANT CHANGE UP (ref 0–1.5)
LYMPHOCYTES # BLD AUTO: 1.45 K/UL — SIGNIFICANT CHANGE UP (ref 1–3.3)
LYMPHOCYTES # BLD AUTO: 23.3 % — SIGNIFICANT CHANGE UP (ref 13–44)
MCHC RBC-ENTMCNC: 30.1 PG — SIGNIFICANT CHANGE UP (ref 27–34)
MCHC RBC-ENTMCNC: 32 GM/DL — SIGNIFICANT CHANGE UP (ref 32–36)
MCV RBC AUTO: 93.9 FL — SIGNIFICANT CHANGE UP (ref 80–100)
MONOCYTES # BLD AUTO: 0.54 K/UL — SIGNIFICANT CHANGE UP (ref 0–0.9)
MONOCYTES NFR BLD AUTO: 8.7 % — SIGNIFICANT CHANGE UP (ref 2–14)
NEUTROPHILS # BLD AUTO: 4.1 K/UL — SIGNIFICANT CHANGE UP (ref 1.8–7.4)
NEUTROPHILS NFR BLD AUTO: 65.8 % — SIGNIFICANT CHANGE UP (ref 43–77)
NRBC # BLD: 0 /100 WBCS — SIGNIFICANT CHANGE UP (ref 0–0)
PLATELET # BLD AUTO: 199 K/UL — SIGNIFICANT CHANGE UP (ref 150–400)
POTASSIUM SERPL-MCNC: 4.3 MMOL/L — SIGNIFICANT CHANGE UP (ref 3.5–5.3)
POTASSIUM SERPL-SCNC: 4.3 MMOL/L — SIGNIFICANT CHANGE UP (ref 3.5–5.3)
PROT SERPL-MCNC: 6.4 G/DL — SIGNIFICANT CHANGE UP (ref 6–8.3)
RBC # BLD: 4.09 M/UL — SIGNIFICANT CHANGE UP (ref 3.8–5.2)
RBC # FLD: 13.4 % — SIGNIFICANT CHANGE UP (ref 10.3–14.5)
SODIUM SERPL-SCNC: 144 MMOL/L — SIGNIFICANT CHANGE UP (ref 135–145)
WBC # BLD: 6.23 K/UL — SIGNIFICANT CHANGE UP (ref 3.8–10.5)
WBC # FLD AUTO: 6.23 K/UL — SIGNIFICANT CHANGE UP (ref 3.8–10.5)

## 2021-05-27 PROCEDURE — 99232 SBSQ HOSP IP/OBS MODERATE 35: CPT

## 2021-05-27 RX ORDER — TAMOXIFEN CITRATE 20 MG/1
20 TABLET, FILM COATED ORAL DAILY
Refills: 0 | Status: DISCONTINUED | OUTPATIENT
Start: 2021-05-27 | End: 2021-06-02

## 2021-05-27 RX ORDER — BETHANECHOL CHLORIDE 25 MG
25 TABLET ORAL EVERY 12 HOURS
Refills: 0 | Status: DISCONTINUED | OUTPATIENT
Start: 2021-05-27 | End: 2021-05-29

## 2021-05-27 RX ADMIN — Medication 1 MILLIGRAM(S): at 12:38

## 2021-05-27 RX ADMIN — Medication 50 MILLIGRAM(S): at 21:39

## 2021-05-27 RX ADMIN — POLYETHYLENE GLYCOL 3350 17 GRAM(S): 17 POWDER, FOR SOLUTION ORAL at 12:37

## 2021-05-27 RX ADMIN — LIDOCAINE 1 PATCH: 4 CREAM TOPICAL at 12:38

## 2021-05-27 RX ADMIN — TAMSULOSIN HYDROCHLORIDE 0.4 MILLIGRAM(S): 0.4 CAPSULE ORAL at 21:39

## 2021-05-27 RX ADMIN — OXYCODONE HYDROCHLORIDE 5 MILLIGRAM(S): 5 TABLET ORAL at 07:46

## 2021-05-27 RX ADMIN — NYSTATIN CREAM 1 APPLICATION(S): 100000 CREAM TOPICAL at 17:22

## 2021-05-27 RX ADMIN — ZINC OXIDE 1 APPLICATION(S): 200 OINTMENT TOPICAL at 17:22

## 2021-05-27 RX ADMIN — TAMOXIFEN CITRATE 10 MILLIGRAM(S): 20 TABLET, FILM COATED ORAL at 12:38

## 2021-05-27 RX ADMIN — ESCITALOPRAM OXALATE 10 MILLIGRAM(S): 10 TABLET, FILM COATED ORAL at 12:38

## 2021-05-27 RX ADMIN — NYSTATIN CREAM 1 APPLICATION(S): 100000 CREAM TOPICAL at 05:16

## 2021-05-27 RX ADMIN — MODAFINIL 50 MILLIGRAM(S): 200 TABLET ORAL at 06:26

## 2021-05-27 RX ADMIN — OXYCODONE HYDROCHLORIDE 5 MILLIGRAM(S): 5 TABLET ORAL at 08:36

## 2021-05-27 RX ADMIN — Medication 1 TABLET(S): at 12:38

## 2021-05-27 RX ADMIN — Medication 25 MILLIGRAM(S): at 17:22

## 2021-05-27 RX ADMIN — LIDOCAINE 1 PATCH: 4 CREAM TOPICAL at 19:26

## 2021-05-27 RX ADMIN — Medication 10 MILLIGRAM(S): at 05:12

## 2021-05-27 RX ADMIN — SENNA PLUS 2 TABLET(S): 8.6 TABLET ORAL at 21:39

## 2021-05-27 RX ADMIN — ZINC OXIDE 1 APPLICATION(S): 200 OINTMENT TOPICAL at 05:16

## 2021-05-27 RX ADMIN — Medication 112 MICROGRAM(S): at 05:12

## 2021-05-27 RX ADMIN — ENOXAPARIN SODIUM 40 MILLIGRAM(S): 100 INJECTION SUBCUTANEOUS at 06:26

## 2021-05-27 NOTE — PROGRESS NOTE ADULT - SUBJECTIVE AND OBJECTIVE BOX
Patient is a 65y old  Female who presents with a chief complaint of SAH and fractures of left-sided ribs, left iliac wing with hematoma, S/P closed reduction of left pinky PIP dislocation after trauma (26 May 2021 15:55)    HPI:  65F PMHx breast cancer s/p mastectomy, HTN, hypothyroidism, not on full anticoagulation/antiplatelets per EMS, presented to [facility] 4/19/21as a level 2 trauma activation after a fall from a flight of stairs. Patient only able to say "ow" in trauma bay, unable to provide further history. However per son, normal mentation at baseline. In the trauma bay, airway was intact with bilateral breath sounds, O2 saturation 100% on room air. Initially blood pressure was 140s systolic however on repeat 90s. NS@100 initiated. Secondary significant for left forehead laceration, left chest wall tenderness, left pelvic tenderness.     Ms. Arthur was hypotensive in the trauma bay and was transferred to the Surgical ICU after imaging was obtained. Her injury list is below.  (1) SAH, bilateral: stable on repeat imaging. Seven day course of Keppra for post-traumatic seizure prophylaxis.  (2) left 3-8 and right 7th rib fractures with occult left pneumothorax: pain control  (3) left iliac wing fracture with hematoma: required transfusion early in her ICU course, after which CBC were stable  (4) left forehead laceration s/p suture repair  (5) s/p successful closed reduction of left pinky PIP dislocation with delfin splint  (6) left clavicle fracture  (7) left nasal bone and left orbital rim fractures  (8) sternal fracture  Incidental findings: right renal lesion. Pt's  was notified and copy of the report given to him.     The patient was admitted to Trauma Surgery under SICU care.  Ortho was consulted for L. iliac wing fx, sternal fx, L. clavicle fx- recommended WBAT of the affected left lower extremity with walker, WBAT L shoulder, pendulum exercises okay, no lifting, sling for comfort, Encourage ROM of elbow/wrist/hand, PT/OT consult, NO urgent orthopedic surgical intervention at this time.    Neurosurgery consulted- recommended repeat CTH, which was stable.     PRS was consulted for non displaced left superior orbital rim and non displaced left nasal bone fracture--> No indication for operative fixation of these non displaced fractures; recommend ice to face to aid in swelling and analgesia.    She was placed on hemorrhage watch in the SICU given pelvic fracture and surrounding hematoma.  Behavior health was consulted for delirium.    Hand was consulted for L. ofeliay PIP dislocation- s/p closed reduction, rec Cont delfin taping for now.    4/21- CTH stable  - Added home lexapro  - Added NS @ 50cc/hr to supplement poor PO intake  - Lactate cleared (1.9)    4/22- Transfused 1u pRBC, Hct 20.9 -> 26.9  - Went into SVT, given adenosine x1 and started on metoprolol 25mg q12hr  - Added oxycodone 5mg q4hrs prn for increased pain control  - PO intake improved, IVL    4/23- EP was consulted for SVT, recc continue lopressor 25mg   Metoprolol PO increased from 25mg q12hrs to 25mg q6hrs.   - Added salt tabs 1g q8hrs and started on NS @ 50cc/hr for hyponatremia  - Spangler discontinued, passed TOV  - Added lovenox  - Cervical collar cleared by confrontational exam     4/24- Patient transferred from SICU to surgical floor in stable condition.  Discharge planning to acute rehab.     Stable from neurologic perspective consistent with mild TBI  Multiple rib fractures - breathing comfortable with multimodal pain control    On day of discharge, the patient was tolerating diet, working with PT well and pain controlled. The patient was medically stable for discharge to acute rehab with instructions for outpatient follow up 4/27/21. (27 Apr 2021 13:27)    not urinating, normal BM, no sense of bladder fullness before IC or sense of relief after IC    MEDICATIONS:  MEDICATIONS  (STANDING):  bethanechol 25 milliGRAM(s) Oral every 12 hours  enoxaparin Injectable 40 milliGRAM(s) SubCutaneous every 24 hours  escitalopram 10 milliGRAM(s) Oral daily  folic acid 1 milliGRAM(s) Oral daily  levothyroxine 112 MICROGram(s) Oral daily  lidocaine   Patch 1 Patch Transdermal daily  metoprolol succinate ER 50 milliGRAM(s) Oral daily  modafinil 50 milliGRAM(s) Oral <User Schedule>  multivitamin 1 Tablet(s) Oral daily  nystatin Powder 1 Application(s) Topical two times a day  oxyCODONE    IR 5 milliGRAM(s) Oral <User Schedule>  polyethylene glycol 3350 17 Gram(s) Oral daily  senna 2 Tablet(s) Oral at bedtime  tamoxifen 10 milliGRAM(s) Oral daily  tamsulosin 0.4 milliGRAM(s) Oral at bedtime  traZODone 50 milliGRAM(s) Oral at bedtime  zinc oxide 40% Ointment 1 Application(s) Topical two times a day    MEDICATIONS  (PRN):  acetaminophen   Tablet .. 650 milliGRAM(s) Oral every 6 hours PRN Temp greater or equal to 38C (100.4F), Mild Pain (1 - 3)      Allergies    Tapazole (Hives)    Intolerances        T(C): 36.8 (05-27-21 @ 07:47), Max: 37.1 (05-26-21 @ 21:17)  T(F): 98.2 (05-27-21 @ 07:47), Max: 98.8 (05-26-21 @ 21:17)  HR: 68 (05-27-21 @ 07:47) (58 - 78)  BP: 107/68 (05-27-21 @ 07:47) (106/67 - 131/76)  RR: 15 (05-27-21 @ 07:47) (14 - 15)  SpO2: 95% (05-27-21 @ 07:47) (93% - 98%)          05-25-21 @ 07:01  -  05-26-21 @ 07:00  --------------------------------------------------------  IN:  Total IN: 0 mL    OUT:    Intermittent Catheterization - Urethral (mL): 600 mL  Total OUT: 600 mL    Total NET: -600 mL      05-26-21 @ 07:01  -  05-27-21 @ 07:00  --------------------------------------------------------  IN:  Total IN: 0 mL    OUT:    Intermittent Catheterization - Urethral (mL): 1750 mL  Total OUT: 1750 mL    Total NET: -1750 mL          LABS:      CBC Full  -  ( 27 May 2021 05:45 )  WBC Count : 6.23 K/uL  RBC Count : 4.09 M/uL  Hemoglobin : 12.3 g/dL  Hematocrit : 38.4 %  Platelet Count - Automated : 199 K/uL  Mean Cell Volume : 93.9 fl  Mean Cell Hemoglobin : 30.1 pg  Mean Cell Hemoglobin Concentration : 32.0 gm/dL  Auto Neutrophil # : 4.10 K/uL  Auto Lymphocyte # : 1.45 K/uL  Auto Monocyte # : 0.54 K/uL  Auto Eosinophil # : 0.09 K/uL  Auto Basophil # : 0.03 K/uL  Auto Neutrophil % : 65.8 %  Auto Lymphocyte % : 23.3 %  Auto Monocyte % : 8.7 %  Auto Eosinophil % : 1.4 %  Auto Basophil % : 0.5 %    05-27    144  |  110<H>  |  22  ----------------------------<  97  4.3   |  25  |  0.62    Ca    9.0      27 May 2021 05:45    TPro  6.4  /  Alb  3.1<L>  /  TBili  0.4  /  DBili  x   /  AST  16  /  ALT  20  /  AlkPhos  198<H>  05-27                  Physical Exam    Constitutional: alert, no acute distress    Abdomen: soft, nontender, nondistended, no HSM    Genitourinary: no bladder distention

## 2021-05-27 NOTE — PROGRESS NOTE ADULT - ASSESSMENT
JONATHAN CHILD is a 65y with PMHx breast Ca S/P mastectomy, HTN, hypothyroidism who presented to [facility] 4/19/21 s/p fall down a flight of stairs with bilateral frontoparietal SAH; L nasal bone and L orbital rim fractures; sternal fracture; L 3-8, R 7th rib fractures;  L pneumothorax, L iliac wing fracture with hematoma S/P 1U pRBCs; left clavicle fx, left V finger PIP dislocation s/p CR. She is WBAT Left LE, NWB Left UE.     # t SAH  - Avoid NSAIDs  - Modafanil reduced to 50 mg due to restlessness, insomnia 5/24  - Cont comprehensive rehab program, PT/OT/SLP 3 hours a day, 5 days a week\- F/u neurosurgery, Dr. Vikas Aquino, upon discharge  - Precautions: falls, sternal , WB as above, AMS, cardiac, h/o breast CA    # S/P Fall 5/17/21 with head strike and lacerations left lateral brow and forehead  - Stat CTH, CT cervical, CT C/A/P no acute findings 5/17.  - [place] [place], imaging reviewed and no indication for acute transfer.   - Plastics consulted and appreciated 5/17, and forehead laceration x 2 sutured  ·	steri strips are to be kept clean and dry and not to be removed before 6/2/21.  ·	from 5/34 -6/2/21, call plastic surgery on call for any emergency  - Repeat Head CT 12 hours post on 5/18 unremarkable  - Lovenox restarted 5/19. Hgb 12.4 5/24--> 12.3 5/27 stable  - continue 2:1 supervision, reassessed 5/27    # Multiple fractures. WBAT LLE, NWB LUE   - OK ROM exercises for L elbow, wrist, hand.  L shoulder pendulum exercises. Sling to LUE for comfort. No lifting more than 1-3 pounds for 6 weeks. Will follow with ortho at that time for upgrade in WB/ROM (5/31)  - LSO brace whenever OOB. (replaced 5/14)  -  buddy tape dc'd for L 5th finger dislocation (per Dr. Choe 5/10)  - Continue Lovenox for 6 weeks total (through 6/3)  - follow up on dc:  ·	plastic surgeon Dr. Nell Choe, for facial laceration, L pinky dislocation, L nasal bone and orbital rim fractures.   ·	ortho, Dr. Reji Cardoza, for L clavicle and L pelvic wing fractures  ·	surgery, Dr. Dino Peña, for rib fractures    # Acute Hypoxic Respiratory Failure likely due to Acute B/L rib fractures, and small pneumothorax  - Incentive spirometry. Breathing exercises  - stable on RA    # Elevated Alk Phos likely 2/2 bone fractures  - Hepatic sono 5/7: Multiple gallstones, without evidence for gallbladder wall thickening or pericholecystic fluid.  -  AST  17  /  ALT  24  /  AlkPhos  237<H>  05-24--> AST  16  /  ALT  20  /  AlkPhos  198<H>  05-27   improved  - CMP 5/27    # H/o SVT  - Continue with toprol XL, reduced to 50 mg daily 5/24  - Can follow up in 3 months with EP Dr. Hernández [phone]. (elective ablation)    #R kidney lesion  - F/u Dr. Herrera outpatient or PCP    #Hypothyroidism  - Continue Synthroid 112mcg daily    # h/o breast CA  - was previously on tamoxifen, stopped when admitted following fall  - resume 20 mg daily    # Depression: stable/improved  - Psychiatry f/u 5/11 read and appreciated:   ·	Continue Lexapro 10mg  ·	Continue Modafinil 50 mg, reduced insomnia and restlessness on lower dose  ·	Trazodone increased to 50 mg 5/24, improved sleep    # insomnia  - currently on trazodone increased dose  - modafanil reduced    # Pain  - Tylenol  - Lidoderm to L chest wall  - Scheduled Oxycodone reduced to 5 mg, improved    # GI/Bowel:  - Senna QHS, Miralax Daily    # /Bladder:   - Avila placed 4/29/21 for urinary retention. Failed TOV 5/6   - Urine Cx: E. coli: started on Nitrofurantoin, switched to CTX for 3 day course, finished 5/18  - UA 5/17 negative  -  consult 5/27 appreciated  ·	Continue Bethanechol--> Will increase bethanecol to 25mg q 12  ·	Continue Flomax 0.4 mg daily. ok to continue flomax but unsure if beneficial  ·	failed TOV will replace avila today 5/27, patient agreeable    # Diet  - regular solids thin liquids  - 1:1 supervision for meals    # DVT ppx:  - Lovenox  - SCDs    # Case discussed in IDT 5/26/21:  - reduced balance, safety and insight. Requires supervision/set up for eating, min assist grooming and UB dressing, mod LB dressing, min assist toileting, CG transfers with cues, ambulates 60 feet WBQC min assist  - Goal: supervision grooming, bathing LBD, Mark shower transfers, 8 step with 1 HR and min assist. May upgrade goals if allowed WBAT LUE and can use walker  - Patient for JULI placement      #LABS  2:1 observation, renewed 5/27  CBC BMP 5/27      ---------------  Outpatient Follow-up (Specialty/Name of physician):  Silvina Herrera  INTERNAL MEDICINE  [address] [place] [address]  Phone: [phone]  Fax: [phone]  Follow Up Time: 1 week    Vikas Aquino)  Neurosurgery  [address] [address]  Phone: [phone]  Fax: [phone]  Follow Up Time: 2 weeks    Nell Cheo)  Plastic Surgery  [address] [facility] [address]  [place], [place] [address]  Phone: [phone]  Fax: [phone]  Follow Up Time: 1 week    Reji Cardoza)  Orthopaedic Surgery  [address] [facility] [address]  [place] [address]  Phone: [phone]  Fax: [phone]  Follow Up Time: Routine    Dino Peña)  Surgery; Surgical Critical Care  [address] [facility], [address] [address]  Phone: [phone]  Fax: [phone]  Follow Up Time: Routine    Morgan Hernández; PhD)  Cardiac Electrophysiology; Cardiovascular Disease; Internal Medicine  [facility] - Dept of Cardiology, [address] [place] [address]  Phone: [phone]  Fax: [phone]

## 2021-05-27 NOTE — PROGRESS NOTE ADULT - PROBLEM SELECTOR PLAN 1
persistent urinary retention, ok to continue flomax but unsure if beneficial. Will increase bethanecol to 25mg q 12. Bowel care, mobilize, IC prn   consider delayed urodynamic testing

## 2021-05-27 NOTE — CHART NOTE - NSCHARTNOTEFT_GEN_A_CORE
Reviewed Agitated Behavior Scale record from 5/18 day shift. Total Agitation score (16/56), Disinhibited Behaviors, Aggressive Behaviors and Lability all within Normal limits. No significant agitation on that shift. No further records were available.

## 2021-05-27 NOTE — PROGRESS NOTE ADULT - SUBJECTIVE AND OBJECTIVE BOX
Patient is a 65y old  Female who presents with a chief complaint of SAH and fractures of left-sided ribs, left iliac wing with hematoma, S/P closed reduction of left pinky PIP dislocation after trauma (27 May 2021 12:28)      HPI:  65F PMHx breast cancer s/p mastectomy, HTN, hypothyroidism, not on full anticoagulation/antiplatelets per EMS, presented to [facility] 4/19/21as a level 2 trauma activation after a fall from a flight of stairs. Patient only able to say "ow" in trauma bay, unable to provide further history. However per son, normal mentation at baseline. In the trauma bay, airway was intact with bilateral breath sounds, O2 saturation 100% on room air. Initially blood pressure was 140s systolic however on repeat 90s. NS@100 initiated. Secondary significant for left forehead laceration, left chest wall tenderness, left pelvic tenderness.     Ms. Arthur was hypotensive in the trauma bay and was transferred to the Surgical ICU after imaging was obtained. Her injury list is below.  (1) SAH, bilateral: stable on repeat imaging. Seven day course of Keppra for post-traumatic seizure prophylaxis.  (2) left 3-8 and right 7th rib fractures with occult left pneumothorax: pain control  (3) left iliac wing fracture with hematoma: required transfusion early in her ICU course, after which CBC were stable  (4) left forehead laceration s/p suture repair  (5) s/p successful closed reduction of left pinky PIP dislocation with delfin splint  (6) left clavicle fracture  (7) left nasal bone and left orbital rim fractures  (8) sternal fracture  Incidental findings: right renal lesion. Pt's  was notified and copy of the report given to him.     The patient was admitted to Trauma Surgery under SICU care.  Ortho was consulted for L. iliac wing fx, sternal fx, L. clavicle fx- recommended WBAT of the affected left lower extremity with walker, WBAT L shoulder, pendulum exercises okay, no lifting, sling for comfort, Encourage ROM of elbow/wrist/hand, PT/OT consult, NO urgent orthopedic surgical intervention at this time.    Neurosurgery consulted- recommended repeat CTH, which was stable.     PRS was consulted for non displaced left superior orbital rim and non displaced left nasal bone fracture--> No indication for operative fixation of these non displaced fractures; recommend ice to face to aid in swelling and analgesia.    She was placed on hemorrhage watch in the SICU given pelvic fracture and surrounding hematoma.  Behavior health was consulted for delirium.    Hand was consulted for L. pinky PIP dislocation- s/p closed reduction, rec Cont delfin taping for now.    4/21- CTH stable  - Added home lexapro  - Added NS @ 50cc/hr to supplement poor PO intake  - Lactate cleared (1.9)    4/22- Transfused 1u pRBC, Hct 20.9 -> 26.9  - Went into SVT, given adenosine x1 and started on metoprolol 25mg q12hr  - Added oxycodone 5mg q4hrs prn for increased pain control  - PO intake improved, IVL    4/23- EP was consulted for SVT, recc continue lopressor 25mg   Metoprolol PO increased from 25mg q12hrs to 25mg q6hrs.   - Added salt tabs 1g q8hrs and started on NS @ 50cc/hr for hyponatremia  - Avila discontinued, passed TOV  - Added lovenox  - Cervical collar cleared by confrontational exam     4/24- Patient transferred from SICU to surgical floor in stable condition.  Discharge planning to acute rehab.     Stable from neurologic perspective consistent with mild TBI  Multiple rib fractures - breathing comfortable with multimodal pain control    On day of discharge, the patient was tolerating diet, working with PT well and pain controlled. The patient was medically stable for discharge to acute rehab with instructions for outpatient follow up 4/27/21. 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    C Section    Abnormality, eye  h/o left eye vitrectomy    S/P D&amp;C (status post dilation and curettage)    H/O left mastectomy        MEDICATIONS  (STANDING):  bethanechol 25 milliGRAM(s) Oral every 12 hours  enoxaparin Injectable 40 milliGRAM(s) SubCutaneous every 24 hours  escitalopram 10 milliGRAM(s) Oral daily  folic acid 1 milliGRAM(s) Oral daily  levothyroxine 112 MICROGram(s) Oral daily  lidocaine   Patch 1 Patch Transdermal daily  metoprolol succinate ER 50 milliGRAM(s) Oral daily  modafinil 50 milliGRAM(s) Oral <User Schedule>  multivitamin 1 Tablet(s) Oral daily  nystatin Powder 1 Application(s) Topical two times a day  oxyCODONE    IR 5 milliGRAM(s) Oral <User Schedule>  polyethylene glycol 3350 17 Gram(s) Oral daily  senna 2 Tablet(s) Oral at bedtime  tamoxifen 10 milliGRAM(s) Oral daily  tamsulosin 0.4 milliGRAM(s) Oral at bedtime  traZODone 50 milliGRAM(s) Oral at bedtime  zinc oxide 40% Ointment 1 Application(s) Topical two times a day    MEDICATIONS  (PRN):  acetaminophen   Tablet .. 650 milliGRAM(s) Oral every 6 hours PRN Temp greater or equal to 38C (100.4F), Mild Pain (1 - 3)      Allergies    Tapazole (Hives)    Intolerances          VITALS  65y  Vital Signs Last 24 Hrs  T(C): 36.8 (27 May 2021 07:47), Max: 37.1 (26 May 2021 21:17)  T(F): 98.2 (27 May 2021 07:47), Max: 98.8 (26 May 2021 21:17)  HR: 68 (27 May 2021 07:47) (58 - 72)  BP: 107/68 (27 May 2021 07:47) (107/68 - 118/78)  BP(mean): --  RR: 15 (27 May 2021 07:47) (14 - 15)  SpO2: 95% (27 May 2021 07:47) (93% - 95%)  Daily     Daily         RECENT LABS:                          12.3   6.23  )-----------( 199      ( 27 May 2021 05:45 )             38.4     05-27    144  |  110<H>  |  22  ----------------------------<  97  4.3   |  25  |  0.62    Ca    9.0      27 May 2021 05:45    TPro  6.4  /  Alb  3.1<L>  /  TBili  0.4  /  DBili  x   /  AST  16  /  ALT  20  /  AlkPhos  198<H>  05-27    LIVER FUNCTIONS - ( 27 May 2021 05:45 )  Alb: 3.1 g/dL / Pro: 6.4 g/dL / ALK PHOS: 198 U/L / ALT: 20 U/L / AST: 16 U/L / GGT: x                   CAPILLARY BLOOD GLUCOSE                Review of Systems:   · Additional ROS	 Patient continues to have signfiicant retention, no ability to void and requires continued SC and no significant improvement in PVRs. Will replace avila, discussed with patient who is agreeable. Patient reports improved sleep last night    Physical Exam:   · Constitutional	detailed exam  · Constitutional Details	no distress  · Constitutional Comments	 GCS=14 ([id number])     laceration healing well, steri strips still intact. no oozing, no swelling  ecchymoses significantly improved, swelling resolved    	  	  · Respiratory	detailed exam  · Respiratory Details	breath sounds equal; respirations non-labored; reduced BS bases  · Cardiovascular	detailed exam  · Cardiovascular Details	regular rate and rhythm  · Gastrointestinal	detailed exam  · GI Normal	nontender; no distention; bowel sounds normal  	no suprapubic fullness or TTP  · Extremities	calves soft, NT no erythema or warmth

## 2021-05-28 PROCEDURE — 99232 SBSQ HOSP IP/OBS MODERATE 35: CPT

## 2021-05-28 RX ADMIN — LIDOCAINE 1 PATCH: 4 CREAM TOPICAL at 19:55

## 2021-05-28 RX ADMIN — TAMOXIFEN CITRATE 20 MILLIGRAM(S): 20 TABLET, FILM COATED ORAL at 11:42

## 2021-05-28 RX ADMIN — Medication 1 TABLET(S): at 11:42

## 2021-05-28 RX ADMIN — OXYCODONE HYDROCHLORIDE 5 MILLIGRAM(S): 5 TABLET ORAL at 08:00

## 2021-05-28 RX ADMIN — Medication 25 MILLIGRAM(S): at 18:16

## 2021-05-28 RX ADMIN — ENOXAPARIN SODIUM 40 MILLIGRAM(S): 100 INJECTION SUBCUTANEOUS at 06:18

## 2021-05-28 RX ADMIN — LIDOCAINE 1 PATCH: 4 CREAM TOPICAL at 11:43

## 2021-05-28 RX ADMIN — Medication 1 MILLIGRAM(S): at 11:43

## 2021-05-28 RX ADMIN — SENNA PLUS 2 TABLET(S): 8.6 TABLET ORAL at 21:39

## 2021-05-28 RX ADMIN — ESCITALOPRAM OXALATE 10 MILLIGRAM(S): 10 TABLET, FILM COATED ORAL at 11:43

## 2021-05-28 RX ADMIN — ZINC OXIDE 1 APPLICATION(S): 200 OINTMENT TOPICAL at 06:11

## 2021-05-28 RX ADMIN — NYSTATIN CREAM 1 APPLICATION(S): 100000 CREAM TOPICAL at 18:16

## 2021-05-28 RX ADMIN — LIDOCAINE 1 PATCH: 4 CREAM TOPICAL at 00:02

## 2021-05-28 RX ADMIN — ZINC OXIDE 1 APPLICATION(S): 200 OINTMENT TOPICAL at 18:16

## 2021-05-28 RX ADMIN — TAMSULOSIN HYDROCHLORIDE 0.4 MILLIGRAM(S): 0.4 CAPSULE ORAL at 21:39

## 2021-05-28 RX ADMIN — OXYCODONE HYDROCHLORIDE 5 MILLIGRAM(S): 5 TABLET ORAL at 07:37

## 2021-05-28 RX ADMIN — Medication 50 MILLIGRAM(S): at 21:39

## 2021-05-28 RX ADMIN — Medication 112 MICROGRAM(S): at 06:10

## 2021-05-28 RX ADMIN — POLYETHYLENE GLYCOL 3350 17 GRAM(S): 17 POWDER, FOR SOLUTION ORAL at 11:42

## 2021-05-28 RX ADMIN — LIDOCAINE 1 PATCH: 4 CREAM TOPICAL at 22:52

## 2021-05-28 RX ADMIN — MODAFINIL 50 MILLIGRAM(S): 200 TABLET ORAL at 06:10

## 2021-05-28 RX ADMIN — NYSTATIN CREAM 1 APPLICATION(S): 100000 CREAM TOPICAL at 06:11

## 2021-05-28 NOTE — PROGRESS NOTE ADULT - SUBJECTIVE AND OBJECTIVE BOX
Patient is a 65y old  Female who presents with a chief complaint of SAH and fractures of left-sided ribs, left iliac wing with hematoma, S/P closed reduction of left pinky PIP dislocation after trauma (27 May 2021 17:02)    Reports did not sleep well last night  Had a BM last night  Spangler placed back in 5/27/2021; failed TOV  Patient seen and examined at bedside.    ALLERGIES:  Tapazole (Hives)    MEDICATIONS  (STANDING):  bethanechol 25 milliGRAM(s) Oral every 12 hours  enoxaparin Injectable 40 milliGRAM(s) SubCutaneous every 24 hours  escitalopram 10 milliGRAM(s) Oral daily  folic acid 1 milliGRAM(s) Oral daily  levothyroxine 112 MICROGram(s) Oral daily  lidocaine   Patch 1 Patch Transdermal daily  metoprolol succinate ER 50 milliGRAM(s) Oral daily  modafinil 50 milliGRAM(s) Oral <User Schedule>  multivitamin 1 Tablet(s) Oral daily  nystatin Powder 1 Application(s) Topical two times a day  oxyCODONE    IR 5 milliGRAM(s) Oral <User Schedule>  polyethylene glycol 3350 17 Gram(s) Oral daily  senna 2 Tablet(s) Oral at bedtime  tamoxifen 20 milliGRAM(s) Oral daily  tamsulosin 0.4 milliGRAM(s) Oral at bedtime  traZODone 50 milliGRAM(s) Oral at bedtime  zinc oxide 40% Ointment 1 Application(s) Topical two times a day    MEDICATIONS  (PRN):  acetaminophen   Tablet .. 650 milliGRAM(s) Oral every 6 hours PRN Temp greater or equal to 38C (100.4F), Mild Pain (1 - 3)    Vital Signs Last 24 Hrs  T(F): 98.3 (28 May 2021 10:03), Max: 98.3 (28 May 2021 10:03)  HR: 70 (28 May 2021 10:03) (68 - 87)  BP: 112/75 (28 May 2021 10:49) (102/65 - 122/83)  RR: 16 (28 May 2021 10:03) (15 - 16)  SpO2: 97% (28 May 2021 10:03) (93% - 97%)  I&O's Summary    27 May 2021 07:01  -  28 May 2021 07:00  --------------------------------------------------------  IN: 0 mL / OUT: 1250 mL / NET: -1250 mL        PHYSICAL EXAM:  General: NAD, A/O x 3,f oley draining yellow urine  ENT: MMM, no scleral icterus  Neck: Supple, No JVD, no thyroidomegaly  Lungs: Clear to auscultation bilaterally, no wheezes, no rales, no rhonchi, good inspiratory effort  Cardio: RRR, S1/S2, No murmurs  Abdomen: Soft, Nontender, Nondistended; Bowel sounds present  Extremities: No calf tenderness, No pitting edema, no skin changes    LABS:                        12.3   6.23  )-----------( 199      ( 27 May 2021 05:45 )             38.4       05-27    144  |  110  |  22  ----------------------------<  97  4.3   |  25  |  0.62    Ca    9.0      27 May 2021 05:45    TPro  6.4  /  Alb  3.1  /  TBili  0.4  /  DBili  x   /  AST  16  /  ALT  20  /  AlkPhos  198  05-27     eGFR if : 108 mL/min/1.73M2 (05-27-21 @ 05:45)  eGFR if Non : 93 mL/min/1.73M2 (05-27-21 @ 05:45)       COVID-19 PCR: NotDetec (05-22-21 @ 05:00)  COVID-19 PCR: NotDetec (05-16-21 @ 05:00)  COVID-19 PCR: NotDetec (05-10-21 @ 05:00)  COVID-19 PCR: NotDetec (05-04-21 @ 05:00)    COVID-19 PCR: NotDetec (05-22-21 @ 05:00)  COVID-19 PCR: NotDetec (05-16-21 @ 05:00)  COVID-19 PCR: NotDetec (05-10-21 @ 05:00)  COVID-19 PCR: NotDetec (05-04-21 @ 05:00)  COVID-19 PCR: NotDetec (04-27-21 @ 18:10)  COVID-19 PCR: NotDetec (04-26-21 @ 06:33)  COVID-19 PCR: NotDetec (04-22-21 @ 11:24)  COVID-19 PCR: NotDetec (04-19-21 @ 11:09)        RADIOLOGY & ADDITIONAL TESTS:  Care Discussed with Consultants/Other Providers:

## 2021-05-28 NOTE — PROGRESS NOTE ADULT - ASSESSMENT
JONATHAN CHILD is a 65y with PMHx breast Ca S/P mastectomy, HTN, hypothyroidism who presented to [facility] 4/19/21 s/p fall down a flight of stairs with bilateral frontoparietal SAH; L nasal bone and L orbital rim fractures; sternal fracture; L 3-8, R 7th rib fractures;  L pneumothorax, L iliac wing fracture with hematoma S/P 1U pRBCs; left clavicle fx, left V finger PIP dislocation s/p CR. She is WBAT Left LE, NWB Left UE.     # t SAH  - Avoid NSAIDs  - Modafanil reduced to 50 mg due to restlessness, insomnia 5/24. Will assess over weekend, if insomnia persists, will dc and evaluated  - Cont comprehensive rehab program, PT/OT/SLP 3 hours a day, 5 days a week\- F/u neurosurgery, Dr. Vikas Aquino, upon discharge  - continue 2:1 observation due to history of recurrent falls, impulsivity terry due to bladder issues (restless both with retention and with avila. UA negative)  - Precautions: falls, sternal , WB as above, AMS, cardiac, h/o breast CA    # S/P Fall 5/17/21 with head strike and lacerations left lateral brow and forehead  - Stat CTH, CT cervical, CT C/A/P no acute findings 5/17.  - Spoke with Dr. Wheatley ([facility] [place] via [place] center, imaging reviewed and no indication for acute transfer.   - Plastics consulted and appreciated 5/17, and forehead laceration x 2 sutured  ·	steri strips are to be kept clean and dry and not to be removed before 6/2/21.  ·	from 5/34 -6/2/21, call plastic surgery on call for any emergency  - Repeat Head CT 12 hours post on 5/18 unremarkable  - Lovenox restarted 5/19. Hgb 12.4 5/24--> 12.3 5/27 stable  - continue 2:1 supervision    # Multiple fractures. WBAT LLE, NWB LUE   - OK ROM exercises for L elbow, wrist, hand.  L shoulder pendulum exercises. Sling to LUE for comfort. No lifting more than 1-3 pounds for 6 weeks. Will follow with ortho re: possible upgrade WB/ROM (6/1)  - LSO brace whenever OOB. (replaced 5/14)  -  buddy tape dc'd for L 5th finger dislocation (per Dr. Choe 5/10)  - Continue Lovenox for 6 weeks total (through 6/3)  - follow up on dc:  ·	plastic surgeon Dr. Nell Choe, for facial laceration, L pinky dislocation, L nasal bone and orbital rim fractures.   ·	ortho, Dr. Reji Cardoza, for L clavicle and L pelvic wing fractures  ·	surgery, Dr. Dino Peña, for rib fractures    # Acute Hypoxic Respiratory Failure likely due to Acute B/L rib fractures, and small pneumothorax  - Incentive spirometry. Breathing exercises  - stable on RA    # Elevated Alk Phos likely 2/2 bone fractures  - Hepatic sono 5/7: Multiple gallstones, without evidence for gallbladder wall thickening or pericholecystic fluid.  -  AST  17  /  ALT  24  /  AlkPhos  237<H>  05-24--> AST  16  /  ALT  20  /  AlkPhos  198<H>  05-27  - CMP 5/31    # H/o SVT  - Continue with toprol XL, reduced to 50 mg daily 5/24  - Can follow up in 3 months with EP Dr. Hernández [phone]. (elective ablation)    #R kidney lesion  - F/u Dr. Herrera outpatient or PCP    #Hypothyroidism  - Continue Synthroid 112mcg daily    # h/o breast CA  - was previously on tamoxifen, stopped when admitted following fall  - resume 20 mg daily    # Depression: stable/improved  - Psychiatry f/u 5/11 read and appreciated:   ·	Continue Lexapro 10mg  ·	Continue Modafinil 50 mg, reduced insomnia and restlessness on lower dose. Possible hold for continued insomnia,. monitor overnight  ·	Trazodone 50 mg qhs    # insomnia  - currently on trazodone increased dose  - modafanil reduced    # Pain  - Tylenol  - Lidoderm to L chest wall  - Scheduled Oxycodone reduced to 5 mg, improved    # GI/Bowel:  - Senna QHS, Miralax Daily    # /Bladder:   - Avila placed 4/29/21 for urinary retention. Failed TOV 5/6   - Urine Cx: E. coli: started on Nitrofurantoin, switched to CTX for 3 day course, finished 5/18  - UA 5/17 negative  -  consult 5/27 appreciated  ·	Continue Bethanechol-, increased to 25mg q 12  ·	Continue Flomax 0.4 mg daily. ok to continue flomax but unsure if beneficial  ·	failed TOVmargarita replaced 5/27. Renew 5/28    # Diet  - regular solids thin liquids  - 1:1 supervision for meals    # DVT ppx:  - Lovenox  - SCDs    # Case discussed in IDT 5/26/21:  - reduced balance, safety and insight. Requires supervision/set up for eating, min assist grooming and UB dressing, mod LB dressing, min assist toileting, CG transfers with cues, ambulates 60 feet WBQC min assist  - Goal: supervision grooming, bathing LBD, Mark shower transfers, 8 step with 1 HR and min assist. May upgrade goals if allowed WBAT LUE and can use walker  - Patient for JULI placement      #LABS  2:1 observation, renewed 5/28  avila  monitor insomnia  CBC BMP 5/27      ---------------  Outpatient Follow-up (Specialty/Name of physician):  Silvina Herrera  INTERNAL MEDICINE  [address] [place] [address]  Phone: [phone]  Fax: [phone]  Follow Up Time: 1 week    Vikas Aquino)  Neurosurgery  [address] [facility], [address] [place] [address]  Phone: [phone]  Fax: [phone]  Follow Up Time: 2 weeks    Nell Choe)  Plastic Surgery  [address] [facility], [address]  [place], [place] [address]  Phone: [phone]  Fax: [phone]  Follow Up Time: 1 week    Reji Cardoza)  Orthopaedic Surgery  [address] [facility], [address]  [place] [address]  Phone: [phone]  Fax: [phone]  Follow Up Time: Routine    Dino Peña)  Surgery; Surgical Critical Care  [address] [facility], [address]  [place] [address]  Phone: [phone]  Fax: [phone]  Follow Up Time: Routine    Morgan Hernández; PhD)  Cardiac Electrophysiology; Cardiovascular Disease; Internal Medicine  [facility] - Dept of Cardiology, [address] [place] [address]  Phone: [phone]  Fax: [phone]

## 2021-05-28 NOTE — PROGRESS NOTE ADULT - SUBJECTIVE AND OBJECTIVE BOX
Patient is a 65y old  Female who presents with a chief complaint of SAH and fractures of left-sided ribs, left iliac wing with hematoma, S/P closed reduction of left pinky PIP dislocation after trauma (28 May 2021 11:32)      HPI:  65F PMHx breast cancer s/p mastectomy, HTN, hypothyroidism, not on full anticoagulation/antiplatelets per EMS, presented to [facility] 4/19/21as a level 2 trauma activation after a fall from a flight of stairs. Patient only able to say "ow" in trauma bay, unable to provide further history. However per son, normal mentation at baseline. In the trauma bay, airway was intact with bilateral breath sounds, O2 saturation 100% on room air. Initially blood pressure was 140s systolic however on repeat 90s. NS@100 initiated. Secondary significant for left forehead laceration, left chest wall tenderness, left pelvic tenderness.     Ms. Arthur was hypotensive in the trauma bay and was transferred to the Surgical ICU after imaging was obtained. Her injury list is below.  (1) SAH, bilateral: stable on repeat imaging. Seven day course of Keppra for post-traumatic seizure prophylaxis.  (2) left 3-8 and right 7th rib fractures with occult left pneumothorax: pain control  (3) left iliac wing fracture with hematoma: required transfusion early in her ICU course, after which CBC were stable  (4) left forehead laceration s/p suture repair  (5) s/p successful closed reduction of left pinky PIP dislocation with delfni splint  (6) left clavicle fracture  (7) left nasal bone and left orbital rim fractures  (8) sternal fracture  Incidental findings: right renal lesion. Pt's  was notified and copy of the report given to him.     The patient was admitted to Trauma Surgery under SICU care.  Ortho was consulted for L. iliac wing fx, sternal fx, L. clavicle fx- recommended WBAT of the affected left lower extremity with walker, WBAT L shoulder, pendulum exercises okay, no lifting, sling for comfort, Encourage ROM of elbow/wrist/hand, PT/OT consult, NO urgent orthopedic surgical intervention at this time.    Neurosurgery consulted- recommended repeat CTH, which was stable.     PRS was consulted for non displaced left superior orbital rim and non displaced left nasal bone fracture--> No indication for operative fixation of these non displaced fractures; recommend ice to face to aid in swelling and analgesia.    She was placed on hemorrhage watch in the SICU given pelvic fracture and surrounding hematoma.  Behavior health was consulted for delirium.    Hand was consulted for L. pinky PIP dislocation- s/p closed reduction, rec Cont delfin taping for now.    4/21- CTH stable  - Added home lexapro  - Added NS @ 50cc/hr to supplement poor PO intake  - Lactate cleared (1.9)    4/22- Transfused 1u pRBC, Hct 20.9 -> 26.9  - Went into SVT, given adenosine x1 and started on metoprolol 25mg q12hr  - Added oxycodone 5mg q4hrs prn for increased pain control  - PO intake improved, IVL    4/23- EP was consulted for SVT, recc continue lopressor 25mg   Metoprolol PO increased from 25mg q12hrs to 25mg q6hrs.   - Added salt tabs 1g q8hrs and started on NS @ 50cc/hr for hyponatremia  - Avila discontinued, passed TOV  - Added lovenox  - Cervical collar cleared by confrontational exam     4/24- Patient transferred from SICU to surgical floor in stable condition.  Discharge planning to acute rehab.     Stable from neurologic perspective consistent with mild TBI  Multiple rib fractures - breathing comfortable with multimodal pain control    On day of discharge, the patient was tolerating diet, working with PT well and pain controlled. The patient was medically stable for discharge to acute rehab with instructions for outpatient follow up 4/27/21. 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    C Section    Abnormality, eye  h/o left eye vitrectomy    S/P D&amp;C (status post dilation and curettage)    H/O left mastectomy        MEDICATIONS  (STANDING):  bethanechol 25 milliGRAM(s) Oral every 12 hours  enoxaparin Injectable 40 milliGRAM(s) SubCutaneous every 24 hours  escitalopram 10 milliGRAM(s) Oral daily  folic acid 1 milliGRAM(s) Oral daily  levothyroxine 112 MICROGram(s) Oral daily  lidocaine   Patch 1 Patch Transdermal daily  metoprolol succinate ER 50 milliGRAM(s) Oral daily  modafinil 50 milliGRAM(s) Oral <User Schedule>  multivitamin 1 Tablet(s) Oral daily  nystatin Powder 1 Application(s) Topical two times a day  oxyCODONE    IR 5 milliGRAM(s) Oral <User Schedule>  polyethylene glycol 3350 17 Gram(s) Oral daily  senna 2 Tablet(s) Oral at bedtime  tamoxifen 20 milliGRAM(s) Oral daily  tamsulosin 0.4 milliGRAM(s) Oral at bedtime  traZODone 50 milliGRAM(s) Oral at bedtime  zinc oxide 40% Ointment 1 Application(s) Topical two times a day    MEDICATIONS  (PRN):  acetaminophen   Tablet .. 650 milliGRAM(s) Oral every 6 hours PRN Temp greater or equal to 38C (100.4F), Mild Pain (1 - 3)      Allergies    Tapazole (Hives)    Intolerances          VITALS  65y  Vital Signs Last 24 Hrs  T(C): 36.8 (28 May 2021 10:03), Max: 36.8 (27 May 2021 19:24)  T(F): 98.3 (28 May 2021 10:03), Max: 98.3 (28 May 2021 10:03)  HR: 70 (28 May 2021 10:03) (68 - 87)  BP: 112/75 (28 May 2021 10:49) (102/65 - 122/83)  BP(mean): --  RR: 16 (28 May 2021 10:03) (15 - 16)  SpO2: 97% (28 May 2021 10:03) (93% - 97%)  Daily     Daily         RECENT LABS:                          12.3   6.23  )-----------( 199      ( 27 May 2021 05:45 )             38.4     05-27    144  |  110<H>  |  22  ----------------------------<  97  4.3   |  25  |  0.62    Ca    9.0      27 May 2021 05:45    TPro  6.4  /  Alb  3.1<L>  /  TBili  0.4  /  DBili  x   /  AST  16  /  ALT  20  /  AlkPhos  198<H>  05-27    LIVER FUNCTIONS - ( 27 May 2021 05:45 )  Alb: 3.1 g/dL / Pro: 6.4 g/dL / ALK PHOS: 198 U/L / ALT: 20 U/L / AST: 16 U/L / GGT: x                   CAPILLARY BLOOD GLUCOSE            Review of Systems:   · Additional ROS	 Patient had poor sleep last night. Had ifficult time expressing why, denies pain, denies ruminating thoughts. Has avila in place; possible irritation from avila, however when given choice of removing again and SC, patient chooses to keep in.    Provigil reduced earlier in week to 50 mg    Physical Exam:   · Constitutional	detailed exam  · Constitutional Details	no distress  · Constitutional Comments	 GCS=14 ([id number])     laceration healing well, steri strips starting to curl at ends but intact  dry, ecchymoses nearly resolved    	  	  · Respiratory	detailed exam  · Respiratory Details	breath sounds equal; respirations non-labored  LSO in place  · Cardiovascular	detailed exam  · Cardiovascular Details	regular rate and rhythm  · Gastrointestinal	detailed exam  · GI Normal	nontender; no distention; bowel sounds normal  	no suprapubic fullness or TTP  · Extremities	calves soft, NT no erythema or warmth bialterally

## 2021-05-28 NOTE — PROGRESS NOTE ADULT - ASSESSMENT
64 yo woman with PMHx breast cancer s/p mastectomy, HTN, Hypothyroidism admitted to [facility] after hospital course at [facility] after a fall from a flight of stairs, sustaining SAH, left-sided rib fractures, left iliac wing fracture with hematoma, left forehead laceration, s/p successful closed reduction of left pinky PIP dislocation, Acute L3 compression fracture, 4/29 admitted to [facility]    #Fall  #SAH  #Multiple fractures  -continue comprehensive rehabilitation program per PMR  -continue safety monitoring     #Urinary retention  #Constipation  -continue Flomax / urecholine  -will need Avila when discharged; avila placed 5/27/21  -continue bowel regimen    #Normocytic Anemia with folic acid deficiency - stable  - monitor H/H    #Elevated ALKPHOS  -Due to multiple fractures   -Downtrending  -avoid hepatotoxins    #Episodes of SVT   -Metoprolol ER 50mg daily with holding parameters  -Can follow up in 3 months with EP Dr. Hernández [phone]. (elective ablation)    #Hypothyroidism  -Continue Synthroid    #VTE PPX   -Lovenox    Discussed with Rehab Team

## 2021-05-29 DIAGNOSIS — N28.9 DISORDER OF KIDNEY AND URETER, UNSPECIFIED: ICD-10-CM

## 2021-05-29 PROCEDURE — 99232 SBSQ HOSP IP/OBS MODERATE 35: CPT

## 2021-05-29 RX ORDER — OXYCODONE HYDROCHLORIDE 5 MG/1
5 TABLET ORAL
Refills: 0 | Status: DISCONTINUED | OUTPATIENT
Start: 2021-05-29 | End: 2021-06-02

## 2021-05-29 RX ORDER — MODAFINIL 200 MG/1
50 TABLET ORAL
Refills: 0 | Status: DISCONTINUED | OUTPATIENT
Start: 2021-05-29 | End: 2021-05-29

## 2021-05-29 RX ORDER — BETHANECHOL CHLORIDE 25 MG
25 TABLET ORAL EVERY 8 HOURS
Refills: 0 | Status: DISCONTINUED | OUTPATIENT
Start: 2021-05-29 | End: 2021-06-02

## 2021-05-29 RX ADMIN — LIDOCAINE 1 PATCH: 4 CREAM TOPICAL at 19:30

## 2021-05-29 RX ADMIN — SENNA PLUS 2 TABLET(S): 8.6 TABLET ORAL at 21:27

## 2021-05-29 RX ADMIN — POLYETHYLENE GLYCOL 3350 17 GRAM(S): 17 POWDER, FOR SOLUTION ORAL at 12:39

## 2021-05-29 RX ADMIN — Medication 50 MILLIGRAM(S): at 07:53

## 2021-05-29 RX ADMIN — ENOXAPARIN SODIUM 40 MILLIGRAM(S): 100 INJECTION SUBCUTANEOUS at 06:20

## 2021-05-29 RX ADMIN — Medication 25 MILLIGRAM(S): at 07:54

## 2021-05-29 RX ADMIN — NYSTATIN CREAM 1 APPLICATION(S): 100000 CREAM TOPICAL at 06:21

## 2021-05-29 RX ADMIN — Medication 50 MILLIGRAM(S): at 21:27

## 2021-05-29 RX ADMIN — Medication 25 MILLIGRAM(S): at 13:27

## 2021-05-29 RX ADMIN — Medication 112 MICROGRAM(S): at 06:20

## 2021-05-29 RX ADMIN — OXYCODONE HYDROCHLORIDE 5 MILLIGRAM(S): 5 TABLET ORAL at 07:53

## 2021-05-29 RX ADMIN — ESCITALOPRAM OXALATE 10 MILLIGRAM(S): 10 TABLET, FILM COATED ORAL at 12:40

## 2021-05-29 RX ADMIN — LIDOCAINE 1 PATCH: 4 CREAM TOPICAL at 12:40

## 2021-05-29 RX ADMIN — OXYCODONE HYDROCHLORIDE 5 MILLIGRAM(S): 5 TABLET ORAL at 07:54

## 2021-05-29 RX ADMIN — NYSTATIN CREAM 1 APPLICATION(S): 100000 CREAM TOPICAL at 18:33

## 2021-05-29 RX ADMIN — Medication 1 TABLET(S): at 12:40

## 2021-05-29 RX ADMIN — Medication 1 MILLIGRAM(S): at 12:40

## 2021-05-29 RX ADMIN — ZINC OXIDE 1 APPLICATION(S): 200 OINTMENT TOPICAL at 18:33

## 2021-05-29 RX ADMIN — Medication 25 MILLIGRAM(S): at 21:27

## 2021-05-29 RX ADMIN — TAMSULOSIN HYDROCHLORIDE 0.4 MILLIGRAM(S): 0.4 CAPSULE ORAL at 21:27

## 2021-05-29 RX ADMIN — TAMOXIFEN CITRATE 20 MILLIGRAM(S): 20 TABLET, FILM COATED ORAL at 13:11

## 2021-05-29 RX ADMIN — MODAFINIL 50 MILLIGRAM(S): 200 TABLET ORAL at 06:20

## 2021-05-29 RX ADMIN — ZINC OXIDE 1 APPLICATION(S): 200 OINTMENT TOPICAL at 06:21

## 2021-05-29 NOTE — PROGRESS NOTE ADULT - SUBJECTIVE AND OBJECTIVE BOX
Patient is a 65y old  Female who presents with a chief complaint of SAH and fractures of left-sided ribs, left iliac wing with hematoma, S/P closed reduction of left pinky PIP dislocation after trauma (28 May 2021 11:46)      HPI:  65F PMHx breast cancer s/p mastectomy, HTN, hypothyroidism, not on full anticoagulation/antiplatelets per EMS, presented to [facility] 4/19/21as a level 2 trauma activation after a fall from a flight of stairs. Patient only able to say "ow" in trauma bay, unable to provide further history. However per son, normal mentation at baseline. In the trauma bay, airway was intact with bilateral breath sounds, O2 saturation 100% on room air. Initially blood pressure was 140s systolic however on repeat 90s. NS@100 initiated. Secondary significant for left forehead laceration, left chest wall tenderness, left pelvic tenderness.     Ms. Arthur was hypotensive in the trauma bay and was transferred to the Surgical ICU after imaging was obtained. Her injury list is below.  (1) SAH, bilateral: stable on repeat imaging. Seven day course of Keppra for post-traumatic seizure prophylaxis.  (2) left 3-8 and right 7th rib fractures with occult left pneumothorax: pain control  (3) left iliac wing fracture with hematoma: required transfusion early in her ICU course, after which CBC were stable  (4) left forehead laceration s/p suture repair  (5) s/p successful closed reduction of left pinky PIP dislocation with delfin splint  (6) left clavicle fracture  (7) left nasal bone and left orbital rim fractures  (8) sternal fracture  Incidental findings: right renal lesion. Pt's  was notified and copy of the report given to him.     The patient was admitted to Trauma Surgery under SICU care.  Ortho was consulted for L. iliac wing fx, sternal fx, L. clavicle fx- recommended WBAT of the affected left lower extremity with walker, WBAT L shoulder, pendulum exercises okay, no lifting, sling for comfort, Encourage ROM of elbow/wrist/hand, PT/OT consult, NO urgent orthopedic surgical intervention at this time.    Neurosurgery consulted- recommended repeat CTH, which was stable.     PRS was consulted for non displaced left superior orbital rim and non displaced left nasal bone fracture--> No indication for operative fixation of these non displaced fractures; recommend ice to face to aid in swelling and analgesia.    She was placed on hemorrhage watch in the SICU given pelvic fracture and surrounding hematoma.  Behavior health was consulted for delirium.    Hand was consulted for L. pinky PIP dislocation- s/p closed reduction, rec Cont delfin taping for now.    4/21- CTH stable  - Added home lexapro  - Added NS @ 50cc/hr to supplement poor PO intake  - Lactate cleared (1.9)    4/22- Transfused 1u pRBC, Hct 20.9 -> 26.9  - Went into SVT, given adenosine x1 and started on metoprolol 25mg q12hr  - Added oxycodone 5mg q4hrs prn for increased pain control  - PO intake improved, IVL    4/23- EP was consulted for SVT, recc continue lopressor 25mg   Metoprolol PO increased from 25mg q12hrs to 25mg q6hrs.   - Added salt tabs 1g q8hrs and started on NS @ 50cc/hr for hyponatremia  - Avila discontinued, passed TOV  - Added lovenox  - Cervical collar cleared by confrontational exam     4/24- Patient transferred from SICU to surgical floor in stable condition.  Discharge planning to acute rehab.     Stable from neurologic perspective consistent with mild TBI  Multiple rib fractures - breathing comfortable with multimodal pain control    On day of discharge, the patient was tolerating diet, working with PT well and pain controlled. The patient was medically stable for discharge to acute rehab with instructions for outpatient follow up 4/27/21. 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    C Section    Abnormality, eye  h/o left eye vitrectomy    S/P D&amp;C (status post dilation and curettage)    H/O left mastectomy        MEDICATIONS  (STANDING):  bethanechol 25 milliGRAM(s) Oral every 12 hours  enoxaparin Injectable 40 milliGRAM(s) SubCutaneous every 24 hours  escitalopram 10 milliGRAM(s) Oral daily  folic acid 1 milliGRAM(s) Oral daily  levothyroxine 112 MICROGram(s) Oral daily  lidocaine   Patch 1 Patch Transdermal daily  metoprolol succinate ER 50 milliGRAM(s) Oral daily  modafinil 50 milliGRAM(s) Oral <User Schedule>  multivitamin 1 Tablet(s) Oral daily  nystatin Powder 1 Application(s) Topical two times a day  oxyCODONE    IR 5 milliGRAM(s) Oral <User Schedule>  polyethylene glycol 3350 17 Gram(s) Oral daily  senna 2 Tablet(s) Oral at bedtime  tamoxifen 20 milliGRAM(s) Oral daily  tamsulosin 0.4 milliGRAM(s) Oral at bedtime  traZODone 50 milliGRAM(s) Oral at bedtime  zinc oxide 40% Ointment 1 Application(s) Topical two times a day    MEDICATIONS  (PRN):  acetaminophen   Tablet .. 650 milliGRAM(s) Oral every 6 hours PRN Temp greater or equal to 38C (100.4F), Mild Pain (1 - 3)      Allergies    Tapazole (Hives)    Intolerances          VITALS  65y  Vital Signs Last 24 Hrs  T(C): 36.8 (29 May 2021 09:12), Max: 36.8 (28 May 2021 20:39)  T(F): 98.3 (29 May 2021 09:12), Max: 98.3 (29 May 2021 09:12)  HR: 77 (29 May 2021 09:12) (67 - 93)  BP: 113/69 (29 May 2021 09:12) (105/68 - 118/69)  BP(mean): --  RR: 16 (29 May 2021 09:12) (16 - 16)  SpO2: 96% (29 May 2021 09:12) (94% - 96%)  Daily     Daily         RECENT LABS:                      CAPILLARY BLOOD GLUCOSE                CAPILLARY BLOOD GLUCOSE            Review of Systems:   · Additional ROS	 Patient still had difficulty sleeping, denies abdominal/suprapubic pain. avila draining clear urine. Denies H/A or back pain    Physical Exam:   · Constitutional	detailed exam  · Constitutional Details	no distress  · Constitutional Comments	 GCS=14 (E4V4M6)   overall improved initiation and asks questions "can you repeat that" independently of my prompting NAD    ecchymoses fading. laceration +steri strips, intact, dry    	  	  · Respiratory	detailed exam  · Respiratory Details	breath sounds equal; respirations non-labored    · Cardiovascular	detailed exam  · Cardiovascular Details	regular rate and rhythm  · Gastrointestinal	detailed exam  · GI Normal	nontender; no distention; bowel sounds normal  	no suprapubic fullness or TTP  · Extremities	calves soft, NT no erythema or warmth bialterally

## 2021-05-29 NOTE — PROGRESS NOTE ADULT - ASSESSMENT
JONATHAN CHILD is a 65y with PMHx breast Ca S/P mastectomy, HTN, hypothyroidism who presented to [facility] 4/19/21 s/p fall down a flight of stairs with bilateral frontoparietal SAH; L nasal bone and L orbital rim fractures; sternal fracture; L 3-8, R 7th rib fractures;  L pneumothorax, L iliac wing fracture with hematoma S/P 1U pRBCs; left clavicle fx, left V finger PIP dislocation s/p CR. She is WBAT Left LE, NWB Left UE.     # t SAH  - Avoid NSAIDs  - Modafanil reduced to 50 mg due to restlessness, insomnia 5/24. Will dc today and monitor as insomnia persists 5/29  - Cont comprehensive rehab program, PT/OT/SLP 3 hours a day, 5 days a week\- F/u neurosurgery, Dr. Vikas Aquino, upon discharge  - continue 2:1 observation due to history of recurrent falls, impulsivity terry due to bladder issues (restless both with retention and with avila. UA negative)  - Precautions: falls, sternal , WB as above, AMS, cardiac, h/o breast CA    # S/P Fall 5/17/21 with head strike and lacerations left lateral brow and forehead  - Stat CTH, CT cervical, CT C/A/P no acute findings 5/17.  - Spoke with Dr. Wheatley ([facility] [place]) via transfer center, imaging reviewed and no indication for acute transfer.   - Plastics consulted and appreciated 5/17, and forehead laceration x 2 sutured  ·	steri strips are to be kept clean and dry and not to be removed before 6/2/21.  ·	from 5/34 -6/2/21, call plastic surgery on call for any emergency  - Repeat Head CT 12 hours post on 5/18 unremarkable  - Lovenox restarted 5/19. Hgb 12.4 5/24--> 12.3 5/27 stable  - continue 2:1 supervision    # Multiple fractures. WBAT LLE, NWB LUE   - OK ROM exercises for L elbow, wrist, hand.  L shoulder pendulum exercises. Sling to LUE for comfort. No lifting more than 1-3 pounds for 6 weeks. Will follow with ortho re: possible upgrade WB/ROM (6/1)  - LSO brace whenever OOB. (replaced 5/14)  -  buddy tape dc'd for L 5th finger dislocation (per Dr. Choe 5/10)  - Continue Lovenox for 6 weeks total (through 6/3)  - follow up on dc:  ·	plastic surgeon Dr. Nell Choe, for facial laceration, L pinky dislocation, L nasal bone and orbital rim fractures.   ·	ortho, Dr. Reji Cardoza, for L clavicle and L pelvic wing fractures  ·	surgery, Dr. Dino Peña, for rib fractures    # Acute Hypoxic Respiratory Failure likely due to Acute B/L rib fractures, and small pneumothorax  - Incentive spirometry. Breathing exercises  - stable on RA    # Elevated Alk Phos likely 2/2 bone fractures  - Hepatic sono 5/7: Multiple gallstones, without evidence for gallbladder wall thickening or pericholecystic fluid.  -  AST  17  /  ALT  24  /  AlkPhos  237<H>  05-24--> AST  16  /  ALT  20  /  AlkPhos  198<H>  05-27  - CMP 5/31    # H/o SVT  - Continue with toprol XL, reduced to 50 mg daily 5/24  - Can follow up in 3 months with EP Dr. Hernández [phone]. (elective ablation)    #R kidney lesion  - F/u Dr. Herrera outpatient or PCP    #Hypothyroidism  - Continue Synthroid 112mcg daily    # h/o breast CA  - was previously on tamoxifen, stopped when admitted following fall  - resume 20 mg daily    # Depression: stable/improved  - Psychiatry f/u 5/11 read and appreciated:   ·	Continue Lexapro 10mg  ·	Modafinil 50 mg dc 5/29 due to insomnia  ·	Trazodone 50 mg qhs    # insomnia  - currently on trazodone 50 qhs  - dc modafanil    # Pain  - Tylenol  - Lidoderm to L chest wall  - Scheduled Oxycodone reduced to 5 mg, improved    # GI/Bowel:  - Senna QHS, Miralax Daily    # /Bladder:   - Avila placed 4/29/21 for urinary retention. Failed TOV 5/6   - Urine Cx: E. coli: started on Nitrofurantoin, switched to CTX for 3 day course, finished 5/18  - UA 5/17 negative  -  consult 5/27 appreciated  ·	Continue Bethanechol-, increased to 25mg q 12  ·	Continue Flomax 0.4 mg daily. ok to continue flomax but unsure if beneficial  ·	failed TOV, avila replaced 5/27. Renew 5/29    # Diet  - regular solids thin liquids  - 1:1 supervision for meals    # DVT ppx:  - Lovenox  - SCDs    # Case discussed in IDT 5/26/21:  - reduced balance, safety and insight. Requires supervision/set up for eating, min assist grooming and UB dressing, mod LB dressing, min assist toileting, CG transfers with cues, ambulates 60 feet WBQC min assist  - Goal: supervision grooming, bathing LBD, Mark shower transfers, 8 step with 1 HR and min assist. May upgrade goals if allowed WBAT LUE and can use walker  - Patient for JULI placement      #LABS  2:1 observation, renewed 5/29  avila  monitor insomnia  CBC BMP 5/27      ---------------  Outpatient Follow-up (Specialty/Name of physician):  Silvina Herrera  INTERNAL MEDICINE  [address] [place] [address]  Phone: [phone]  Fax: [phone]  Follow Up Time: 1 week    Vikas Aquino)  Neurosurgery  [address] [place] [address], [address] [place] [address]  Phone: [phone]  Fax: [phone]  Follow Up Time: 2 weeks    Nell Choe)  Plastic Surgery  [address] [facility], [address]  [place], [place] [id number]  Phone: [phone]  Fax: [phone]  Follow Up Time: 1 week    Reji Cardoza)  Orthopaedic Surgery  [address] [facility], [address]  [place] [address]  Phone: [phone]  Fax: [phone]  Follow Up Time: Routine    Dino Peña)  Surgery; Surgical Critical Care  [address] [facility], [address], [place] [address]  Phone: [phone]  Fax: [phone]  Follow Up Time: Routine    Morgan Hernández; PhD)  Cardiac Electrophysiology; Cardiovascular Disease; Internal Medicine  [facility] - Dept of Cardiology, [address] [place] [address]  Phone: [phone]  Fax: [phone]

## 2021-05-29 NOTE — PROGRESS NOTE ADULT - SUBJECTIVE AND OBJECTIVE BOX
Patient is a 65y old  Female who presents with a chief complaint of SAH and fractures of left-sided ribs, left iliac wing with hematoma, S/P closed reduction of left pinky PIP dislocation after trauma (29 May 2021 12:25)    HPI:  65F PMHx breast cancer s/p mastectomy, HTN, hypothyroidism, not on full anticoagulation/antiplatelets per EMS, presented to [facility] 4/19/21as a level 2 trauma activation after a fall from a flight of stairs. Patient only able to say "ow" in trauma bay, unable to provide further history. However per son, normal mentation at baseline. In the trauma bay, airway was intact with bilateral breath sounds, O2 saturation 100% on room air. Initially blood pressure was 140s systolic however on repeat 90s. NS@100 initiated. Secondary significant for left forehead laceration, left chest wall tenderness, left pelvic tenderness.     Ms. Arthur was hypotensive in the trauma bay and was transferred to the Surgical ICU after imaging was obtained. Her injury list is below.  (1) SAH, bilateral: stable on repeat imaging. Seven day course of Keppra for post-traumatic seizure prophylaxis.  (2) left 3-8 and right 7th rib fractures with occult left pneumothorax: pain control  (3) left iliac wing fracture with hematoma: required transfusion early in her ICU course, after which CBC were stable  (4) left forehead laceration s/p suture repair  (5) s/p successful closed reduction of left pinky PIP dislocation with delfin splint  (6) left clavicle fracture  (7) left nasal bone and left orbital rim fractures  (8) sternal fracture  Incidental findings: right renal lesion. Pt's  was notified and copy of the report given to him.     The patient was admitted to Trauma Surgery under SICU care.  Ortho was consulted for L. iliac wing fx, sternal fx, L. clavicle fx- recommended WBAT of the affected left lower extremity with walker, WBAT L shoulder, pendulum exercises okay, no lifting, sling for comfort, Encourage ROM of elbow/wrist/hand, PT/OT consult, NO urgent orthopedic surgical intervention at this time.    Neurosurgery consulted- recommended repeat CTH, which was stable.     PRS was consulted for non displaced left superior orbital rim and non displaced left nasal bone fracture--> No indication for operative fixation of these non displaced fractures; recommend ice to face to aid in swelling and analgesia.    She was placed on hemorrhage watch in the SICU given pelvic fracture and surrounding hematoma.  Behavior health was consulted for delirium.    Hand was consulted for L. pinky PIP dislocation- s/p closed reduction, rec Cont delfin taping for now.    4/21- CTH stable  - Added home lexapro  - Added NS @ 50cc/hr to supplement poor PO intake  - Lactate cleared (1.9)    4/22- Transfused 1u pRBC, Hct 20.9 -> 26.9  - Went into SVT, given adenosine x1 and started on metoprolol 25mg q12hr  - Added oxycodone 5mg q4hrs prn for increased pain control  - PO intake improved, IVL    4/23- EP was consulted for SVT, recc continue lopressor 25mg   Metoprolol PO increased from 25mg q12hrs to 25mg q6hrs.   - Added salt tabs 1g q8hrs and started on NS @ 50cc/hr for hyponatremia  - Avila discontinued, passed TOV  - Added lovenox  - Cervical collar cleared by confrontational exam     4/24- Patient transferred from SICU to surgical floor in stable condition.  Discharge planning to acute rehab.     Stable from neurologic perspective consistent with mild TBI  Multiple rib fractures - breathing comfortable with multimodal pain control    On day of discharge, the patient was tolerating diet, working with PT well and pain controlled. The patient was medically stable for discharge to acute rehab with instructions for outpatient follow up 4/27/21. (27 Apr 2021 13:27)    avila replaced    Interval Events:  Patient seen and examined at bedside.    MEDICATIONS:  MEDICATIONS  (STANDING):  bethanechol 25 milliGRAM(s) Oral every 8 hours  enoxaparin Injectable 40 milliGRAM(s) SubCutaneous every 24 hours  escitalopram 10 milliGRAM(s) Oral daily  folic acid 1 milliGRAM(s) Oral daily  levothyroxine 112 MICROGram(s) Oral daily  lidocaine   Patch 1 Patch Transdermal daily  metoprolol succinate ER 50 milliGRAM(s) Oral daily  multivitamin 1 Tablet(s) Oral daily  nystatin Powder 1 Application(s) Topical two times a day  oxyCODONE    IR 5 milliGRAM(s) Oral <User Schedule>  polyethylene glycol 3350 17 Gram(s) Oral daily  senna 2 Tablet(s) Oral at bedtime  tamoxifen 20 milliGRAM(s) Oral daily  tamsulosin 0.4 milliGRAM(s) Oral at bedtime  traZODone 50 milliGRAM(s) Oral at bedtime  zinc oxide 40% Ointment 1 Application(s) Topical two times a day    MEDICATIONS  (PRN):  acetaminophen   Tablet .. 650 milliGRAM(s) Oral every 6 hours PRN Temp greater or equal to 38C (100.4F), Mild Pain (1 - 3)      Allergies    Tapazole (Hives)    Intolerances        T(C): 36.8 (05-29-21 @ 09:12), Max: 36.8 (05-27-21 @ 19:24)  T(F): 98.3 (05-29-21 @ 09:12), Max: 98.3 (05-28-21 @ 10:03)  HR: 77 (05-29-21 @ 09:12) (67 - 93)  BP: 113/69 (05-29-21 @ 09:12) (102/65 - 122/83)  RR: 16 (05-29-21 @ 09:12) (15 - 16)  SpO2: 96% (05-29-21 @ 09:12) (93% - 97%)          05-27-21 @ 07:01  -  05-28-21 @ 07:00  --------------------------------------------------------  IN:  Total IN: 0 mL    OUT:    Indwelling Catheter - Urethral (mL): 1250 mL  Total OUT: 1250 mL    Total NET: -1250 mL      05-28-21 @ 07:01  -  05-29-21 @ 07:00  --------------------------------------------------------  IN:  Total IN: 0 mL    OUT:    Indwelling Catheter - Urethral (mL): 1150 mL  Total OUT: 1150 mL    Total NET: -1150 mL          LABS:                            Physical Exam    Constitutional: alert, no acute distress    Abdomen: soft, nontender, nondistended, no HSM    Genitourinary: no bladder distention, avila yellow

## 2021-05-30 PROCEDURE — 99232 SBSQ HOSP IP/OBS MODERATE 35: CPT

## 2021-05-30 RX ADMIN — LIDOCAINE 1 PATCH: 4 CREAM TOPICAL at 18:52

## 2021-05-30 RX ADMIN — Medication 25 MILLIGRAM(S): at 06:10

## 2021-05-30 RX ADMIN — Medication 50 MILLIGRAM(S): at 21:30

## 2021-05-30 RX ADMIN — Medication 50 MILLIGRAM(S): at 06:10

## 2021-05-30 RX ADMIN — TAMSULOSIN HYDROCHLORIDE 0.4 MILLIGRAM(S): 0.4 CAPSULE ORAL at 21:30

## 2021-05-30 RX ADMIN — ENOXAPARIN SODIUM 40 MILLIGRAM(S): 100 INJECTION SUBCUTANEOUS at 06:10

## 2021-05-30 RX ADMIN — NYSTATIN CREAM 1 APPLICATION(S): 100000 CREAM TOPICAL at 17:18

## 2021-05-30 RX ADMIN — OXYCODONE HYDROCHLORIDE 5 MILLIGRAM(S): 5 TABLET ORAL at 09:20

## 2021-05-30 RX ADMIN — ESCITALOPRAM OXALATE 10 MILLIGRAM(S): 10 TABLET, FILM COATED ORAL at 12:07

## 2021-05-30 RX ADMIN — TAMOXIFEN CITRATE 20 MILLIGRAM(S): 20 TABLET, FILM COATED ORAL at 12:08

## 2021-05-30 RX ADMIN — LIDOCAINE 1 PATCH: 4 CREAM TOPICAL at 00:30

## 2021-05-30 RX ADMIN — Medication 112 MICROGRAM(S): at 06:10

## 2021-05-30 RX ADMIN — LIDOCAINE 1 PATCH: 4 CREAM TOPICAL at 12:06

## 2021-05-30 RX ADMIN — ZINC OXIDE 1 APPLICATION(S): 200 OINTMENT TOPICAL at 17:17

## 2021-05-30 RX ADMIN — ZINC OXIDE 1 APPLICATION(S): 200 OINTMENT TOPICAL at 06:11

## 2021-05-30 RX ADMIN — NYSTATIN CREAM 1 APPLICATION(S): 100000 CREAM TOPICAL at 06:11

## 2021-05-30 RX ADMIN — Medication 25 MILLIGRAM(S): at 21:30

## 2021-05-30 RX ADMIN — Medication 1 TABLET(S): at 12:06

## 2021-05-30 RX ADMIN — OXYCODONE HYDROCHLORIDE 5 MILLIGRAM(S): 5 TABLET ORAL at 08:23

## 2021-05-30 RX ADMIN — SENNA PLUS 2 TABLET(S): 8.6 TABLET ORAL at 21:30

## 2021-05-30 RX ADMIN — POLYETHYLENE GLYCOL 3350 17 GRAM(S): 17 POWDER, FOR SOLUTION ORAL at 12:06

## 2021-05-30 RX ADMIN — Medication 25 MILLIGRAM(S): at 14:17

## 2021-05-30 RX ADMIN — Medication 1 MILLIGRAM(S): at 12:07

## 2021-05-30 NOTE — PROGRESS NOTE ADULT - ASSESSMENT
JONATHAN CHILD is a 65y with PMHx breast Ca S/P mastectomy, HTN, hypothyroidism who presented to [facility] 4/19/21 s/p fall down a flight of stairs with bilateral frontoparietal SAH; L nasal bone and L orbital rim fractures; sternal fracture; L 3-8, R 7th rib fractures;  L pneumothorax, L iliac wing fracture with hematoma S/P 1U pRBCs; left clavicle fx, left V finger PIP dislocation s/p CR. She is WBAT Left LE, NWB Left UE.     # t SAH  - Avoid NSAIDs  - Modafanil reduced to 50 mg due to restlessness, insomnia 5/24. dc'd 5/29 due to persistent insomnia  - Cont comprehensive rehab program, PT/OT/SLP 3 hours a day, 5 days a week\- F/u neurosurgery, Dr. Vikas Aquino, upon discharge  - continue 2:1 observation due to history of recurrent falls, impulsivity terry due to bladder issues (restless both with retention and with avila. UA negative)  - Precautions: falls, sternal , WB as above, AMS, cardiac, h/o breast CA    # S/P Fall 5/17/21 with head strike and lacerations left lateral brow and forehead  - Stat CTH, CT cervical, CT C/A/P no acute findings 5/17.  - Spoke with Dr. Wheatley ([facility] [place]) via transfer center, imaging reviewed and no indication for acute transfer.   - Plastics consulted and appreciated 5/17, and forehead laceration x 2 sutured  ·	steri strips are to be kept clean and dry and not to be removed before 6/2/21.  ·	from 5/34 -6/2/21, call plastic surgery on call for any emergency  - Repeat Head CT 12 hours post on 5/18 unremarkable  - Lovenox restarted 5/19. Hgb 12.4 5/24--> 12.3 5/27 stable  - continue 2:1 supervision    # Multiple fractures. WBAT LLE, NWB LUE   - OK ROM exercises for L elbow, wrist, hand.  L shoulder pendulum exercises. Sling to LUE for comfort. No lifting more than 1-3 pounds for 6 weeks. Will follow with ortho re: possible upgrade WB/ROM (6/1)  - LSO brace whenever OOB. (replaced 5/14)  -  buddy tape dc'd for L 5th finger dislocation (per Dr. Choe 5/10)  - Continue Lovenox for 6 weeks total (through 6/3)  - follow up on dc:  ·	plastic surgeon Dr. Nell Choe, for facial laceration, L pinky dislocation, L nasal bone and orbital rim fractures.   ·	ortho, Dr. Reji Cardoza, for L clavicle and L pelvic wing fractures  ·	surgery, Dr. Dino Peña, for rib fractures    # Acute Hypoxic Respiratory Failure likely due to Acute B/L rib fractures, and small pneumothorax  - Incentive spirometry. Breathing exercises  - stable on RA    # Elevated Alk Phos likely 2/2 bone fractures  - Hepatic sono 5/7: Multiple gallstones, without evidence for gallbladder wall thickening or pericholecystic fluid.  -  AST  17  /  ALT  24  /  AlkPhos  237<H>  05-24--> AST  16  /  ALT  20  /  AlkPhos  198<H>  05-27  - CMP 5/31    # H/o SVT  - Continue with toprol XL, reduced to 50 mg daily 5/24  - Can follow up in 3 months with EP Dr. Hernández [phone]. (elective ablation)    #R kidney lesion  -  consult appreciated 5/29: CT/sono reviewed, likely bilateral cysts, but consider surveillance with f/u sono ? MRI in future.   - F/u Dr. Herrera outpatient or PCP    #Hypothyroidism  - Continue Synthroid 112mcg daily    # h/o breast CA  - was previously on tamoxifen, stopped when admitted following fall  - resume 20 mg daily    # Depression: stable/improved  - Psychiatry f/u 5/11 read and appreciated:   ·	Continue Lexapro 10mg  ·	Modafinil 50 mg dc 5/29 due to insomnia, monitor  ·	Trazodone 50 mg qhs    # insomnia  - currently on trazodone 50 qhs  - dc modafanil    # Pain  - Tylenol  - Lidoderm to L chest wall  - Scheduled Oxycodone reduced to 5 mg, improved    # GI/Bowel:  - Senna QHS, Miralax Daily    # /Bladder:   - Avila placed 4/29/21 for urinary retention. Failed TOV 5/6   - Urine Cx: E. coli: started on Nitrofurantoin, switched to CTX for 3 day course, finished 5/18  - UA 5/17 negative  -  consult 5/27 appreciated  ·	Continue Bethanechol-, increased to 25mg q 12. __> increased 25 TID 5/30  ·	Continue Flomax 0.4 mg daily. ok to continue flomax but unsure if beneficial  ·	failed TOVmargarita replaced 5/27. Renew 5/30    # Diet  - regular solids thin liquids  - 1:1 supervision for meals    # DVT ppx:  - Lovenox  - SCDs    # Case discussed in IDT 5/26/21:  - reduced balance, safety and insight. Requires supervision/set up for eating, min assist grooming and UB dressing, mod LB dressing, min assist toileting, CG transfers with cues, ambulates 60 feet WBQC min assist  - Goal: supervision grooming, bathing LBD, Mark shower transfers, 8 step with 1 HR and min assist. May upgrade goals if allowed WBAT LUE and can use walker  - Patient for JULI placement      #LABS  2:1 observation, renewed 5/29  avila  monitor insomnia  CBC BMP 5/27      ---------------  Outpatient Follow-up (Specialty/Name of physician):  Silvina Herrera  INTERNAL MEDICINE  [address] [place] [address]  Phone: [phone]  Fax: [phone]  Follow Up Time: 1 week    Vikas Aquino)  Neurosurgery  [address] [facility], [address] [place] [address]  Phone: [phone]  Fax: [phone]  Follow Up Time: 2 weeks    Nell Choe)  Plastic Surgery  [address] [facility], [address]  [place], [place] [id number]  Phone: [phone]  Fax: [phone]  Follow Up Time: 1 week    Reji Cardoza)  Orthopaedic Surgery  [address] [facility], [facility] [address]  [place] [address]  Phone: [phone]  Fax: [phone]  Follow Up Time: Routine    Dino Peña)  Surgery; Surgical Critical Care  [address] [facility], [address], [place] [address]  Phone: [phone]  Fax: [phone]  Follow Up Time: Routine    Morgan Hernández (MD; PhD)  Cardiac Electrophysiology; Cardiovascular Disease; Internal Medicine  [facility] - Dept of Cardiology, [address] [place] [address]  Phone: [phone]  Fax: [phone]

## 2021-05-30 NOTE — PROGRESS NOTE ADULT - SUBJECTIVE AND OBJECTIVE BOX
Patient is a 65y old  Female who presents with a chief complaint of SAH and fractures of left-sided ribs, left iliac wing with hematoma, S/P closed reduction of left pinky PIP dislocation after trauma (29 May 2021 12:54)      SUBJECTIVE / OVERNIGHT EVENTS:  Pt seen and examined at bedside in the presence of 1:1. No acute events overnight.  Pt denies cp, palpitations, sob, abd pain, N/V, fever, chills.    ROS:  All other review of systems negative    Allergies    Tapazole (Hives)    Intolerances        MEDICATIONS  (STANDING):  bethanechol 25 milliGRAM(s) Oral every 8 hours  enoxaparin Injectable 40 milliGRAM(s) SubCutaneous every 24 hours  escitalopram 10 milliGRAM(s) Oral daily  folic acid 1 milliGRAM(s) Oral daily  levothyroxine 112 MICROGram(s) Oral daily  lidocaine   Patch 1 Patch Transdermal daily  metoprolol succinate ER 50 milliGRAM(s) Oral daily  multivitamin 1 Tablet(s) Oral daily  nystatin Powder 1 Application(s) Topical two times a day  oxyCODONE    IR 5 milliGRAM(s) Oral <User Schedule>  polyethylene glycol 3350 17 Gram(s) Oral daily  senna 2 Tablet(s) Oral at bedtime  tamoxifen 20 milliGRAM(s) Oral daily  tamsulosin 0.4 milliGRAM(s) Oral at bedtime  traZODone 50 milliGRAM(s) Oral at bedtime  zinc oxide 40% Ointment 1 Application(s) Topical two times a day    MEDICATIONS  (PRN):  acetaminophen   Tablet .. 650 milliGRAM(s) Oral every 6 hours PRN Temp greater or equal to 38C (100.4F), Mild Pain (1 - 3)      Vital Signs Last 24 Hrs  T(C): 36.8 (30 May 2021 07:38), Max: 36.8 (29 May 2021 09:12)  T(F): 98.3 (30 May 2021 07:38), Max: 98.3 (29 May 2021 09:12)  HR: 66 (30 May 2021 07:38) (66 - 77)  BP: 114/78 (30 May 2021 07:38) (113/69 - 119/80)  BP(mean): --  RR: 15 (30 May 2021 07:38) (14 - 16)  SpO2: 95% (30 May 2021 07:38) (94% - 96%)  CAPILLARY BLOOD GLUCOSE        I&O's Summary    29 May 2021 07:01  -  30 May 2021 07:00  --------------------------------------------------------  IN: 960 mL / OUT: 1300 mL / NET: -340 mL        PHYSICAL EXAM:  GENERAL: NAD, well-developed  EYES: periorbital ecchymosis   CHEST/LUNG: Clear to auscultation bilaterally; No wheeze, nonlabored breathing  HEART: Regular rate and rhythm; No murmurs, rubs, or gallops  ABDOMEN: Soft, Nontender, Nondistended; Bowel sounds present  EXTREMITIES:  2+ Peripheral Pulses, No clubbing, cyanosis, or edema  NEUROLOGY: AAOx3  PSYCH: calm, appropriate mood    LABS:                    RADIOLOGY & ADDITIONAL TESTS:  Results Reviewed:   Imaging Personally Reviewed:  Electrocardiogram Personally Reviewed:    COORDINATION OF CARE:  Care Discussed with Consultants/Other Providers [Y/N]:  Prior or Outpatient Records Reviewed [Y/N]:

## 2021-05-30 NOTE — PROGRESS NOTE ADULT - ASSESSMENT
64 yo woman with PMHx breast cancer s/p mastectomy, HTN, Hypothyroidism admitted to [facility] after hospital course at [facility] after a fall from a flight of stairs, sustaining SAH, left-sided rib fractures, left iliac wing fracture with hematoma, left forehead laceration, s/p successful closed reduction of left pinky PIP dislocation, Acute L3 compression fracture, 4/29 admitted to [facility] unit    #Fall  #SAH  #Multiple fractures  -continue comprehensive rehabilitation program per PMR  -continue safety monitoring     #Urinary retention  #Constipation  -continue Flomax / urecholine  -will need Avila when discharged; avila placed 5/27/21  -continue bowel regimen  -Urology recommendations noted    #Normocytic Anemia with folic acid deficiency - stable  - monitor H/H    #Elevated ALKPHOS  -Due to multiple fractures   -Downtrending  -avoid hepatotoxins    #Episodes of SVT   -Metoprolol ER 50mg daily with holding parameters  -Can follow up in 3 months with EP Dr. Hernández [phone]. (elective ablation)    #Hypothyroidism  -Continue Synthroid    #VTE PPX   -Lovenox

## 2021-05-30 NOTE — PROGRESS NOTE ADULT - SUBJECTIVE AND OBJECTIVE BOX
Patient is a 65y old  Female who presents with a chief complaint of SAH and fractures of left-sided ribs, left iliac wing with hematoma, S/P closed reduction of left pinky PIP dislocation after trauma (30 May 2021 09:04)      HPI:  65F PMHx breast cancer s/p mastectomy, HTN, hypothyroidism, not on full anticoagulation/antiplatelets per EMS, presented to [facility] 4/19/21as a level 2 trauma activation after a fall from a flight of stairs. Patient only able to say "ow" in trauma bay, unable to provide further history. However per son, normal mentation at baseline. In the trauma bay, airway was intact with bilateral breath sounds, O2 saturation 100% on room air. Initially blood pressure was 140s systolic however on repeat 90s. NS@100 initiated. Secondary significant for left forehead laceration, left chest wall tenderness, left pelvic tenderness.     Ms. Arthur was hypotensive in the trauma bay and was transferred to the Surgical ICU after imaging was obtained. Her injury list is below.  (1) SAH, bilateral: stable on repeat imaging. Seven day course of Keppra for post-traumatic seizure prophylaxis.  (2) left 3-8 and right 7th rib fractures with occult left pneumothorax: pain control  (3) left iliac wing fracture with hematoma: required transfusion early in her ICU course, after which CBC were stable  (4) left forehead laceration s/p suture repair  (5) s/p successful closed reduction of left pinky PIP dislocation with delfin splint  (6) left clavicle fracture  (7) left nasal bone and left orbital rim fractures  (8) sternal fracture  Incidental findings: right renal lesion. Pt's  was notified and copy of the report given to him.     The patient was admitted to Trauma Surgery under SICU care.  Ortho was consulted for L. iliac wing fx, sternal fx, L. clavicle fx- recommended WBAT of the affected left lower extremity with walker, WBAT L shoulder, pendulum exercises okay, no lifting, sling for comfort, Encourage ROM of elbow/wrist/hand, PT/OT consult, NO urgent orthopedic surgical intervention at this time.    Neurosurgery consulted- recommended repeat CTH, which was stable.     PRS was consulted for non displaced left superior orbital rim and non displaced left nasal bone fracture--> No indication for operative fixation of these non displaced fractures; recommend ice to face to aid in swelling and analgesia.    She was placed on hemorrhage watch in the SICU given pelvic fracture and surrounding hematoma.  Behavior health was consulted for delirium.    Hand was consulted for L. pinky PIP dislocation- s/p closed reduction, rec Cont delfin taping for now.    4/21- CTH stable  - Added home lexapro  - Added NS @ 50cc/hr to supplement poor PO intake  - Lactate cleared (1.9)    4/22- Transfused 1u pRBC, Hct 20.9 -> 26.9  - Went into SVT, given adenosine x1 and started on metoprolol 25mg q12hr  - Added oxycodone 5mg q4hrs prn for increased pain control  - PO intake improved, IVL    4/23- EP was consulted for SVT, recc continue lopressor 25mg   Metoprolol PO increased from 25mg q12hrs to 25mg q6hrs.   - Added salt tabs 1g q8hrs and started on NS @ 50cc/hr for hyponatremia  - Avila discontinued, passed TOV  - Added lovenox  - Cervical collar cleared by confrontational exam     4/24- Patient transferred from SICU to surgical floor in stable condition.  Discharge planning to acute rehab.     Stable from neurologic perspective consistent with mild TBI  Multiple rib fractures - breathing comfortable with multimodal pain control    On day of discharge, the patient was tolerating diet, working with PT well and pain controlled. The patient was medically stable for discharge to acute rehab with instructions for outpatient follow up 4/27/21. 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    C Section    Abnormality, eye  h/o left eye vitrectomy    S/P D&amp;C (status post dilation and curettage)    H/O left mastectomy        MEDICATIONS  (STANDING):  bethanechol 25 milliGRAM(s) Oral every 8 hours  enoxaparin Injectable 40 milliGRAM(s) SubCutaneous every 24 hours  escitalopram 10 milliGRAM(s) Oral daily  folic acid 1 milliGRAM(s) Oral daily  levothyroxine 112 MICROGram(s) Oral daily  lidocaine   Patch 1 Patch Transdermal daily  metoprolol succinate ER 50 milliGRAM(s) Oral daily  multivitamin 1 Tablet(s) Oral daily  nystatin Powder 1 Application(s) Topical two times a day  oxyCODONE    IR 5 milliGRAM(s) Oral <User Schedule>  polyethylene glycol 3350 17 Gram(s) Oral daily  senna 2 Tablet(s) Oral at bedtime  tamoxifen 20 milliGRAM(s) Oral daily  tamsulosin 0.4 milliGRAM(s) Oral at bedtime  traZODone 50 milliGRAM(s) Oral at bedtime  zinc oxide 40% Ointment 1 Application(s) Topical two times a day    MEDICATIONS  (PRN):  acetaminophen   Tablet .. 650 milliGRAM(s) Oral every 6 hours PRN Temp greater or equal to 38C (100.4F), Mild Pain (1 - 3)      Allergies    Tapazole (Hives)    Intolerances          VITALS  65y  Vital Signs Last 24 Hrs  T(C): 36.8 (30 May 2021 07:38), Max: 36.8 (29 May 2021 20:09)  T(F): 98.3 (30 May 2021 07:38), Max: 98.3 (30 May 2021 07:38)  HR: 66 (30 May 2021 07:38) (66 - 76)  BP: 114/78 (30 May 2021 07:38) (114/78 - 119/80)  BP(mean): --  RR: 15 (30 May 2021 07:38) (14 - 16)  SpO2: 95% (30 May 2021 07:38) (94% - 95%)  Daily     Daily         RECENT LABS:                      CAPILLARY BLOOD GLUCOSE              Review of Systems:   · Additional ROS	 Patient still had interrupted sleep. Will be off provigil for first time today, reviewed with patient.  appreciated, continue avila, bethanechol increased. No other complaints, no pain in ribs, no H/A, no back pain. On 2:1 for safety/fall risk    Physical Exam:   · Constitutional	detailed exam  · Constitutional Details	no distress  · Constitutional Comments	 GCS=14 . alert, mild anxious and restless, reduced initiation and vocal volume    +avila    	  	  · Respiratory	detailed exam  · Respiratory Details	breath sounds equal; respirations non-labored    · Cardiovascular	detailed exam  · Cardiovascular Details	regular rate and rhythm  · Gastrointestinal	detailed exam  · GI Normal	nontender; no distention; bowel sounds normal  	  · Extremities	calves soft, NT no erythema or warmth bialterally

## 2021-05-31 LAB
ALBUMIN SERPL ELPH-MCNC: 3.1 G/DL — LOW (ref 3.3–5)
ALP SERPL-CCNC: 179 U/L — HIGH (ref 40–120)
ALT FLD-CCNC: 24 U/L — SIGNIFICANT CHANGE UP (ref 10–45)
ANION GAP SERPL CALC-SCNC: 12 MMOL/L — SIGNIFICANT CHANGE UP (ref 5–17)
AST SERPL-CCNC: 15 U/L — SIGNIFICANT CHANGE UP (ref 10–40)
BASOPHILS # BLD AUTO: 0.04 K/UL — SIGNIFICANT CHANGE UP (ref 0–0.2)
BASOPHILS NFR BLD AUTO: 0.5 % — SIGNIFICANT CHANGE UP (ref 0–2)
BILIRUB SERPL-MCNC: 0.4 MG/DL — SIGNIFICANT CHANGE UP (ref 0.2–1.2)
BUN SERPL-MCNC: 24 MG/DL — HIGH (ref 7–23)
CALCIUM SERPL-MCNC: 9.1 MG/DL — SIGNIFICANT CHANGE UP (ref 8.4–10.5)
CHLORIDE SERPL-SCNC: 109 MMOL/L — HIGH (ref 96–108)
CO2 SERPL-SCNC: 24 MMOL/L — SIGNIFICANT CHANGE UP (ref 22–31)
CREAT SERPL-MCNC: 0.66 MG/DL — SIGNIFICANT CHANGE UP (ref 0.5–1.3)
EOSINOPHIL # BLD AUTO: 0.07 K/UL — SIGNIFICANT CHANGE UP (ref 0–0.5)
EOSINOPHIL NFR BLD AUTO: 0.9 % — SIGNIFICANT CHANGE UP (ref 0–6)
GLUCOSE SERPL-MCNC: 101 MG/DL — HIGH (ref 70–99)
HCT VFR BLD CALC: 39.8 % — SIGNIFICANT CHANGE UP (ref 34.5–45)
HGB BLD-MCNC: 12.9 G/DL — SIGNIFICANT CHANGE UP (ref 11.5–15.5)
IMM GRANULOCYTES NFR BLD AUTO: 0.4 % — SIGNIFICANT CHANGE UP (ref 0–1.5)
LYMPHOCYTES # BLD AUTO: 1.54 K/UL — SIGNIFICANT CHANGE UP (ref 1–3.3)
LYMPHOCYTES # BLD AUTO: 19.1 % — SIGNIFICANT CHANGE UP (ref 13–44)
MCHC RBC-ENTMCNC: 29.9 PG — SIGNIFICANT CHANGE UP (ref 27–34)
MCHC RBC-ENTMCNC: 32.4 GM/DL — SIGNIFICANT CHANGE UP (ref 32–36)
MCV RBC AUTO: 92.1 FL — SIGNIFICANT CHANGE UP (ref 80–100)
MONOCYTES # BLD AUTO: 0.54 K/UL — SIGNIFICANT CHANGE UP (ref 0–0.9)
MONOCYTES NFR BLD AUTO: 6.7 % — SIGNIFICANT CHANGE UP (ref 2–14)
NEUTROPHILS # BLD AUTO: 5.86 K/UL — SIGNIFICANT CHANGE UP (ref 1.8–7.4)
NEUTROPHILS NFR BLD AUTO: 72.4 % — SIGNIFICANT CHANGE UP (ref 43–77)
NRBC # BLD: 0 /100 WBCS — SIGNIFICANT CHANGE UP (ref 0–0)
PLATELET # BLD AUTO: 193 K/UL — SIGNIFICANT CHANGE UP (ref 150–400)
POTASSIUM SERPL-MCNC: 4.4 MMOL/L — SIGNIFICANT CHANGE UP (ref 3.5–5.3)
POTASSIUM SERPL-SCNC: 4.4 MMOL/L — SIGNIFICANT CHANGE UP (ref 3.5–5.3)
PROT SERPL-MCNC: 6.5 G/DL — SIGNIFICANT CHANGE UP (ref 6–8.3)
RBC # BLD: 4.32 M/UL — SIGNIFICANT CHANGE UP (ref 3.8–5.2)
RBC # FLD: 13.2 % — SIGNIFICANT CHANGE UP (ref 10.3–14.5)
SODIUM SERPL-SCNC: 145 MMOL/L — SIGNIFICANT CHANGE UP (ref 135–145)
WBC # BLD: 8.08 K/UL — SIGNIFICANT CHANGE UP (ref 3.8–10.5)
WBC # FLD AUTO: 8.08 K/UL — SIGNIFICANT CHANGE UP (ref 3.8–10.5)

## 2021-05-31 PROCEDURE — 99232 SBSQ HOSP IP/OBS MODERATE 35: CPT

## 2021-05-31 PROCEDURE — 99233 SBSQ HOSP IP/OBS HIGH 50: CPT

## 2021-05-31 RX ADMIN — NYSTATIN CREAM 1 APPLICATION(S): 100000 CREAM TOPICAL at 06:17

## 2021-05-31 RX ADMIN — ESCITALOPRAM OXALATE 10 MILLIGRAM(S): 10 TABLET, FILM COATED ORAL at 12:18

## 2021-05-31 RX ADMIN — POLYETHYLENE GLYCOL 3350 17 GRAM(S): 17 POWDER, FOR SOLUTION ORAL at 12:20

## 2021-05-31 RX ADMIN — LIDOCAINE 1 PATCH: 4 CREAM TOPICAL at 00:30

## 2021-05-31 RX ADMIN — SENNA PLUS 2 TABLET(S): 8.6 TABLET ORAL at 21:10

## 2021-05-31 RX ADMIN — Medication 1 MILLIGRAM(S): at 12:18

## 2021-05-31 RX ADMIN — TAMSULOSIN HYDROCHLORIDE 0.4 MILLIGRAM(S): 0.4 CAPSULE ORAL at 21:10

## 2021-05-31 RX ADMIN — NYSTATIN CREAM 1 APPLICATION(S): 100000 CREAM TOPICAL at 17:20

## 2021-05-31 RX ADMIN — ENOXAPARIN SODIUM 40 MILLIGRAM(S): 100 INJECTION SUBCUTANEOUS at 06:16

## 2021-05-31 RX ADMIN — OXYCODONE HYDROCHLORIDE 5 MILLIGRAM(S): 5 TABLET ORAL at 07:46

## 2021-05-31 RX ADMIN — Medication 1 TABLET(S): at 12:18

## 2021-05-31 RX ADMIN — ZINC OXIDE 1 APPLICATION(S): 200 OINTMENT TOPICAL at 06:17

## 2021-05-31 RX ADMIN — Medication 112 MICROGRAM(S): at 06:16

## 2021-05-31 RX ADMIN — Medication 25 MILLIGRAM(S): at 06:16

## 2021-05-31 RX ADMIN — Medication 50 MILLIGRAM(S): at 21:10

## 2021-05-31 RX ADMIN — OXYCODONE HYDROCHLORIDE 5 MILLIGRAM(S): 5 TABLET ORAL at 09:57

## 2021-05-31 RX ADMIN — LIDOCAINE 1 PATCH: 4 CREAM TOPICAL at 18:48

## 2021-05-31 RX ADMIN — LIDOCAINE 1 PATCH: 4 CREAM TOPICAL at 12:17

## 2021-05-31 RX ADMIN — Medication 25 MILLIGRAM(S): at 13:04

## 2021-05-31 RX ADMIN — ZINC OXIDE 1 APPLICATION(S): 200 OINTMENT TOPICAL at 17:20

## 2021-05-31 RX ADMIN — TAMOXIFEN CITRATE 20 MILLIGRAM(S): 20 TABLET, FILM COATED ORAL at 12:20

## 2021-05-31 RX ADMIN — Medication 25 MILLIGRAM(S): at 21:10

## 2021-05-31 NOTE — PROGRESS NOTE ADULT - SUBJECTIVE AND OBJECTIVE BOX
Patient is a 65y old  Female who presents with a chief complaint of SAH and fractures of left-sided ribs, left iliac wing with hematoma, S/P closed reduction of left pinky PIP dislocation after trauma (31 May 2021 10:08)      HPI:  65F PMHx breast cancer s/p mastectomy, HTN, hypothyroidism, not on full anticoagulation/antiplatelets per EMS, presented to [facility] 4/19/21as a level 2 trauma activation after a fall from a flight of stairs. Patient only able to say "ow" in trauma bay, unable to provide further history. However per son, normal mentation at baseline. In the trauma bay, airway was intact with bilateral breath sounds, O2 saturation 100% on room air. Initially blood pressure was 140s systolic however on repeat 90s. NS@100 initiated. Secondary significant for left forehead laceration, left chest wall tenderness, left pelvic tenderness.     Ms. Arthur was hypotensive in the trauma bay and was transferred to the Surgical ICU after imaging was obtained. Her injury list is below.  (1) SAH, bilateral: stable on repeat imaging. Seven day course of Keppra for post-traumatic seizure prophylaxis.  (2) left 3-8 and right 7th rib fractures with occult left pneumothorax: pain control  (3) left iliac wing fracture with hematoma: required transfusion early in her ICU course, after which CBC were stable  (4) left forehead laceration s/p suture repair  (5) s/p successful closed reduction of left pinky PIP dislocation with delfin splint  (6) left clavicle fracture  (7) left nasal bone and left orbital rim fractures  (8) sternal fracture  Incidental findings: right renal lesion. Pt's  was notified and copy of the report given to him.     The patient was admitted to Trauma Surgery under SICU care.  Ortho was consulted for L. iliac wing fx, sternal fx, L. clavicle fx- recommended WBAT of the affected left lower extremity with walker, WBAT L shoulder, pendulum exercises okay, no lifting, sling for comfort, Encourage ROM of elbow/wrist/hand, PT/OT consult, NO urgent orthopedic surgical intervention at this time.    Neurosurgery consulted- recommended repeat CTH, which was stable.     PRS was consulted for non displaced left superior orbital rim and non displaced left nasal bone fracture--> No indication for operative fixation of these non displaced fractures; recommend ice to face to aid in swelling and analgesia.    She was placed on hemorrhage watch in the SICU given pelvic fracture and surrounding hematoma.  Behavior health was consulted for delirium.    Hand was consulted for L. pinky PIP dislocation- s/p closed reduction, rec Cont delfin taping for now.    4/21- CTH stable  - Added home lexapro  - Added NS @ 50cc/hr to supplement poor PO intake  - Lactate cleared (1.9)    4/22- Transfused 1u pRBC, Hct 20.9 -> 26.9  - Went into SVT, given adenosine x1 and started on metoprolol 25mg q12hr  - Added oxycodone 5mg q4hrs prn for increased pain control  - PO intake improved, IVL    4/23- EP was consulted for SVT, recc continue lopressor 25mg   Metoprolol PO increased from 25mg q12hrs to 25mg q6hrs.   - Added salt tabs 1g q8hrs and started on NS @ 50cc/hr for hyponatremia  - Avila discontinued, passed TOV  - Added lovenox  - Cervical collar cleared by confrontational exam     4/24- Patient transferred from SICU to surgical floor in stable condition.  Discharge planning to acute rehab.     Stable from neurologic perspective consistent with mild TBI  Multiple rib fractures - breathing comfortable with multimodal pain control    On day of discharge, the patient was tolerating diet, working with PT well and pain controlled. The patient was medically stable for discharge to acute rehab with instructions for outpatient follow up 4/27/21. 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    C Section    Abnormality, eye  h/o left eye vitrectomy    S/P D&amp;C (status post dilation and curettage)    H/O left mastectomy        MEDICATIONS  (STANDING):  bethanechol 25 milliGRAM(s) Oral every 8 hours  enoxaparin Injectable 40 milliGRAM(s) SubCutaneous every 24 hours  escitalopram 10 milliGRAM(s) Oral daily  folic acid 1 milliGRAM(s) Oral daily  levothyroxine 112 MICROGram(s) Oral daily  lidocaine   Patch 1 Patch Transdermal daily  metoprolol succinate ER 50 milliGRAM(s) Oral daily  multivitamin 1 Tablet(s) Oral daily  nystatin Powder 1 Application(s) Topical two times a day  oxyCODONE    IR 5 milliGRAM(s) Oral <User Schedule>  polyethylene glycol 3350 17 Gram(s) Oral daily  senna 2 Tablet(s) Oral at bedtime  tamoxifen 20 milliGRAM(s) Oral daily  tamsulosin 0.4 milliGRAM(s) Oral at bedtime  traZODone 50 milliGRAM(s) Oral at bedtime  zinc oxide 40% Ointment 1 Application(s) Topical two times a day    MEDICATIONS  (PRN):  acetaminophen   Tablet .. 650 milliGRAM(s) Oral every 6 hours PRN Temp greater or equal to 38C (100.4F), Mild Pain (1 - 3)      Allergies    Tapazole (Hives)    Intolerances          VITALS  65y  Vital Signs Last 24 Hrs  T(C): 36.8 (31 May 2021 07:35), Max: 36.9 (30 May 2021 20:16)  T(F): 98.2 (31 May 2021 07:35), Max: 98.4 (30 May 2021 20:16)  HR: 69 (31 May 2021 07:35) (69 - 69)  BP: 97/65 (31 May 2021 07:35) (97/65 - 104/62)  BP(mean): --  RR: 14 (31 May 2021 07:35) (14 - 14)  SpO2: 98% (31 May 2021 07:35) (93% - 98%)  Daily     Daily         RECENT LABS:                          12.9   8.08  )-----------( 193      ( 31 May 2021 05:00 )             39.8     05-31    145  |  109<H>  |  24<H>  ----------------------------<  101<H>  4.4   |  24  |  0.66    Ca    9.1      31 May 2021 05:00    TPro  6.5  /  Alb  3.1<L>  /  TBili  0.4  /  DBili  x   /  AST  15  /  ALT  24  /  AlkPhos  179<H>  05-31    LIVER FUNCTIONS - ( 31 May 2021 05:00 )  Alb: 3.1 g/dL / Pro: 6.5 g/dL / ALK PHOS: 179 U/L / ALT: 24 U/L / AST: 15 U/L / GGT: x                   CAPILLARY BLOOD GLUCOSE              Review of Systems:   · Additional ROS	 Patient with avila in place, draining clear urine. Seen by  today, recommends voiding trial when mobilization improves. Will do TOV tomorrow    Physical Exam:   · Constitutional	detailed exam  · Constitutional Details	no distress  · Constitutional Comments	 conitnues to have reduced prosody speech, reduced verbal output. Mood fair, yes/no fair reliability. On 2:1 due to recurrent falls and occasional impulsivity, poor safety +head injury    	  	  · Respiratory	detailed exam  · Respiratory Details	breath sounds equal; respirations non-labored  fair effort, no splinting  · Cardiovascular	detailed exam  · Cardiovascular Details	regular rate and rhythm  · Gastrointestinal	detailed exam  · GI Normal	nontender; no distention; bowel sounds normal  	  · Extremities	calves soft, NT no erythema or warmth bialterally

## 2021-05-31 NOTE — PROGRESS NOTE ADULT - ASSESSMENT
JONATHAN CHILD is a 65y with PMHx breast Ca S/P mastectomy, HTN, hypothyroidism who presented to [facility] 4/19/21 s/p fall down a flight of stairs with bilateral frontoparietal SAH; L nasal bone and L orbital rim fractures; sternal fracture; L 3-8, R 7th rib fractures;  L pneumothorax, L iliac wing fracture with hematoma S/P 1U pRBCs; left clavicle fx, left V finger PIP dislocation s/p CR. She is WBAT Left LE, NWB Left UE.     # t SAH  - Avoid NSAIDs  - Modafanil dc'd 5/29 due to persistent insomnia  - Cont comprehensive rehab program, PT/OT/SLP 3 hours a day, 5 days a week\- F/u neurosurgery, Dr. Vikas Aquino, upon discharge  - continue 2:1 observation due to history of recurrent falls, impulsivity terry due to bladder issues (restless both with retention and with avila. UA negative). Reassessed 5/31  - Precautions: falls, sternal , WB as above, AMS, cardiac, h/o breast CA    # S/P Fall 5/17/21 with head strike and lacerations left lateral brow and forehead  - Stat CTH, CT cervical, CT C/A/P no acute findings 5/17.  - Spoke with Dr. Wheatley ([facility]) via transfer center, imaging reviewed and no indication for acute transfer.   - Plastics consulted and appreciated 5/17, and forehead laceration x 2 sutured  ·	steri strips are to be kept clean and dry and not to be removed before 6/2/21.  ·	from 5/34 -6/2/21, call plastic surgery on call for any emergency  - Repeat Head CT 12 hours post on 5/18 unremarkable  - Lovenox restarted 5/19. Hgb 12.4 5/24--> 12.3 5/27 --> 12.9 5/31 stable  - continue 2:1 supervision    # Multiple fractures. WBAT LLE, NWB LUE   - OK ROM exercises for L elbow, wrist, hand.  L shoulder pendulum exercises. Sling to LUE for comfort. No lifting more than 1-3 pounds for 6 weeks. Will follow with ortho re: possible upgrade WB/ROM (6/1)  - LSO brace whenever OOB. (replaced 5/14)  - buddy tape dc'd for L 5th finger dislocation (per Dr. Choe 5/10)  - Continue Lovenox for 6 weeks total (through 6/3)  - follow up on dc:  ·	plastic surgeon Dr. Nell Choe, for facial laceration, L pinky dislocation, L nasal bone and orbital rim fractures.   ·	ortho, Dr. Reji Cardoza, for L clavicle and L pelvic wing fractures  ·	surgery, Dr. Dino Peña, for rib fractures    # Acute Hypoxic Respiratory Failure likely due to Acute B/L rib fractures, and small pneumothorax  - Incentive spirometry. Breathing exercises  - stable on RA    # Elevated Alk Phos likely 2/2 bone fractures  - Hepatic sono 5/7: Multiple gallstones, without evidence for gallbladder wall thickening or pericholecystic fluid.  -  AST  17  /  ALT  24  /  AlkPhos  237<H>  05-24--> AST  16  /  ALT  20  /  AlkPhos  198<H>  05-27  - CMP 5/31    # H/o SVT  - Continue with toprol XL, reduced to 50 mg daily 5/24  - Can follow up in 3 months with EP Dr. Hernández [phone]. (elective ablation)    #R kidney lesion  -  consult appreciated 5/29: CT/sono reviewed, likely bilateral cysts, but consider surveillance with f/u sono ? MRI in future.   - F/u Dr. Herrera outpatient or PCP    #Hypothyroidism  - Continue Synthroid 112mcg daily    # h/o breast CA  - was previously on tamoxifen, stopped when admitted following fall  - resume 20 mg daily    # Depression: stable/improved  - Psychiatry f/u 5/11 read and appreciated:   ·	Continue Lexapro 10mg  ·	Modafinil 50 mg dc 5/29 due to insomnia, monitor  ·	Trazodone 50 mg qhs    # insomnia  - currently on trazodone 50 qhs  - dc modafanil    # Pain  - Tylenol  - Lidoderm to L chest wall  - Scheduled Oxycodone reduced to 5 mg, improved    # GI/Bowel:  - Senna QHS, Miralax Daily    # /Bladder:   - Avila placed 4/29/21 for urinary retention. Failed TOV 5/6   - Urine Cx: E. coli: started on Nitrofurantoin, switched to CTX for 3 day course, finished 5/18  - UA 5/17 negative  -  consult 5/27 appreciated  ·	Continue Bethanechol-, increased to 25mg q 12. __> increased 25 TID 5/30  ·	Continue Flomax 0.4 mg daily. ok to continue flomax but unsure if beneficial  ·	failed TOV, avila replaced 5/27. Renew 5/30    # Diet  - regular solids thin liquids  - 1:1 supervision for meals    # DVT ppx:  - Lovenox  - SCDs    # Case discussed in IDT 5/26/21:  - reduced balance, safety and insight. Requires supervision/set up for eating, min assist grooming and UB dressing, mod LB dressing, min assist toileting, CG transfers with cues, ambulates 60 feet WBQC min assist  - Goal: supervision grooming, bathing LBD, Mark shower transfers, 8 step with 1 HR and min assist. May upgrade goals if allowed WBAT LUE and can use walker  - Patient for JULI placement      #LABS  2:1 observation, renewed 5/29  avila  monitor insomnia  CBC BMP 5/27      ---------------  Outpatient Follow-up (Specialty/Name of physician):  Silvina Herrera  INTERNAL MEDICINE  [address] [place] [address]  Phone: [phone]  Fax: [phone]  Follow Up Time: 1 week    Vikas Aquino)  Neurosurgery  [address] [facility], [address] [place] [address]  Phone: [phone]  Fax: [phone]  Follow Up Time: 2 weeks    Nell Choe)  Plastic Surgery  [address] [facility], [address]  [place], [place] [id number]  Phone: [phone]  Fax: [phone]  Follow Up Time: 1 week    Reji Cardoza)  Orthopaedic Surgery  [address] [facility], [facility] [address]  [place] [address]  Phone: [phone]  Fax: [phone]  Follow Up Time: Routine    Dino Peña)  Surgery; Surgical Critical Care  [address] [facility], [address] [address]  Phone: [phone]  Fax: [phone]  Follow Up Time: Routine    Mrogan Hernández (MD; PhD)  Cardiac Electrophysiology; Cardiovascular Disease; Internal Medicine  [facility] - Dept of Cardiology, [address] [place] [address]  Phone: [phone]  Fax: [phone]   JONATHAN CHILD is a 65y with PMHx breast Ca S/P mastectomy, HTN, hypothyroidism who presented to [facility] 4/19/21 s/p fall down a flight of stairs with bilateral frontoparietal SAH; L nasal bone and L orbital rim fractures; sternal fracture; L 3-8, R 7th rib fractures;  L pneumothorax, L iliac wing fracture with hematoma S/P 1U pRBCs; left clavicle fx, left V finger PIP dislocation s/p CR. She is WBAT Left LE, NWB Left UE.     # t SAH  - Avoid NSAIDs  - Modafanil dc'd 5/29 due to persistent insomnia  - Cont comprehensive rehab program, PT/OT/SLP 3 hours a day, 5 days a week\- F/u neurosurgery, Dr. Vikas Aquino, upon discharge  - Precautions: falls, sternal , WB as above, AMS, cardiac, h/o breast CA    # S/P Fall 5/17/21 with head strike and lacerations left lateral brow and forehead  - CT head, CT cervical, CT C/A/P no acute findings 5/17.  - Spoke with Dr. Wheatley ([facility] [place] [facility]) via transfer center, imaging reviewed and no indication for acute transfer.   - Plastics consulted and appreciated 5/17, and forehead laceration x 2 sutured  ·	steri strips are to be kept clean and dry and not to be removed before 6/2/21.  ·	from 5/34 -6/2/21, call plastic surgery on call for any emergency  - Repeat Head CT 12 hours post on 5/18 unremarkable  - Lovenox restarted 5/19. Hgb 12.4 5/24--> 12.3 5/27 --> 12.9 5/31 stable  - continue 2:1 supervision, reassessed 5/31    # Multiple fractures. WBAT LLE, NWB LUE   - OK ROM exercises for L elbow, wrist, hand.  L shoulder pendulum exercises. Sling to LUE for comfort. No lifting more than 1-3 pounds for 6 weeks. Will follow with ortho re: possible upgrade WB/ROM (6/1)  - LSO brace whenever OOB. (replaced 5/14)  - buddy tape dc'd for L 5th finger dislocation (per Dr. Choe 5/10)  - Continue Lovenox for 6 weeks total (through 6/3)  - follow up on dc:  ·	plastic surgeon Dr. Nell Choe, for facial laceration, L pinky dislocation, L nasal bone and orbital rim fractures.   ·	ortho, Dr. Reji Cardoza, for L clavicle and L pelvic wing fractures  ·	surgery, Dr. Dino Peña, for rib fractures    # Acute Hypoxic Respiratory Failure likely due to Acute B/L rib fractures, and small pneumothorax  - Incentive spirometry. Breathing exercises  - stable on RA    # Elevated Alk Phos likely 2/2 bone fractures  - Hepatic sono 5/7: Multiple gallstones, without evidence for gallbladder wall thickening or pericholecystic fluid.  -  AST  17  /  ALT  24  /  AlkPhos  237<H>  05-24--> AST  16  /  ALT  20  /  AlkPhos  198<H>  05-27-->  AST  15  /  ALT  24  /  AlkPhos  179<H>  05-31 5/31 improving  - CMP 6/3    # H/o SVT  - Continue with toprol XL, reduced to 50 mg daily 5/24  - Can follow up in 3 months with EP Dr. Hernández [phone]. (elective ablation)    #R kidney lesion  -  consult appreciated. CT/sono reviewed, recommend surveillance with f/u sono ? MRI in future.   - F/u Dr. Herrera outpatient or PCP    #Hypothyroidism  - Continue Synthroid 112mcg daily    # soft BP 97/65 - 104/62 5/31  - hypernatremia Na 145, BUn 24/o.66  - encourage po fluids, also possibly due to reduced po intake  - repeat BMP in AM 6/1    # h/o breast CA  - was previously on tamoxifen, stopped when admitted following fall  - resume 20 mg daily    # Depression: stable/improved  - Psychiatry f/u 5/11 read and appreciated:   ·	Continue Lexapro 10mg  ·	Modafinil 50 mg dc 5/29 due to insomnia  ·	Trazodone 50 mg qhs    # insomnia  - currently on trazodone 50 qhs  - dc modafanil    # Pain  - Tylenol  - Lidoderm to L chest wall  - Scheduled Oxycodone reduced to 5 mg, improved    # GI/Bowel:  - Senna QHS, Miralax Daily    # /Bladder:   - Avila placed 4/29/21 for urinary retention. Failed TOV 5/6   - Urine Cx: E. coli: started on Nitrofurantoin, switched to CTX for 3 day course, finished 5/18  - UA 5/17 negative  -  f/u 5/31 read and appreciated  ·	Bethanechol- increased 25 TID 5/30  ·	Continue Flomax 0.4 mg daily  ·	failed TOV, avila replaced 5/27. Renew 5/31.  ·	TOV tomorrow 6/1    # Diet  - regular solids thin liquids  - 1:1 supervision for meals    # DVT ppx:  - Lovenox  - SCDs    # Case discussed in IDT 5/26/21:  - reduced balance, safety and insight. Requires supervision/set up for eating, min assist grooming and UB dressing, mod LB dressing, min assist toileting, CG transfers with cues, ambulates 60 feet WBQC min assist  - Goal: supervision grooming, bathing LBD, Mark shower transfers, 8 step with 1 HR and min assist. May upgrade goals if allowed WBAT LUE and can use walker  - Patient for JULI placement      #LABS  2:1 observation  avila. TOV 6/1  monitor insomnia  CBC BMP 6/3      ---------------  Outpatient Follow-up (Specialty/Name of physician):  Silvina Herrera  INTERNAL MEDICINE  [address] [place] [address]  Phone: [phone]  Fax: [phone]  Follow Up Time: 1 week    Vikas Aquino)  Neurosurgery  [address] [facility], [address] [place] [address]  Phone: [phone]  Fax: [phone]  Follow Up Time: 2 weeks    Nell Choe)  Plastic Surgery  [address] [facility], [address]  [place], [place] [id number]  Phone: [phone]  Fax: [phone]  Follow Up Time: 1 week    Reji Cardoza)  Orthopaedic Surgery  [address] [facility], [address]  [place] [address]  Phone: [phone]  Fax: [phone]  Follow Up Time: Routine    Dino Peña)  Surgery; Surgical Critical Care  [address] [facility], [address], [place] [address]  Phone: [phone]  Fax: [phone]  Follow Up Time: Routine    Morgan Hernández (MD; PhD)  Cardiac Electrophysiology; Cardiovascular Disease; Internal Medicine  [facility] - Dept of Cardiology, [address] [place] [address]  Phone: [phone]  Fax: [phone]   JONATHAN CHILD is a 65y with PMHx breast Ca S/P mastectomy, HTN, hypothyroidism who presented to [facility] 4/19/21 s/p fall down a flight of stairs with bilateral frontoparietal SAH; L nasal bone and L orbital rim fractures; sternal fracture; L 3-8, R 7th rib fractures;  L pneumothorax, L iliac wing fracture with hematoma S/P 1U pRBCs; left clavicle fx, left V finger PIP dislocation s/p CR. She is WBAT Left LE, NWB Left UE.     # t SAH  - Avoid NSAIDs  - Modafanil dc'd 5/29 due to persistent insomnia  - Cont comprehensive rehab program, PT/OT/SLP 3 hours a day, 5 days a week\- F/u neurosurgery, Dr. Vikas Aquino, upon discharge  - Precautions: falls, sternal , WB as above, AMS, cardiac, h/o breast CA    # S/P Fall 5/17/21 with head strike and lacerations left lateral brow and forehead  - CT head, CT cervical, CT C/A/P no acute findings 5/17.  - [place]. Wheatley ([facility], imaging reviewed and no indication for acute transfer.   - Plastics consulted and appreciated 5/17, and forehead laceration x 2 sutured  ·	steri strips are to be kept clean and dry and not to be removed before 6/2/21.  ·	from 5/34 -6/2/21, call plastic surgery on call for any emergency  - Repeat Head CT 12 hours post on 5/18 unremarkable  - Lovenox restarted 5/19. Hgb 12.4 5/24--> 12.3 5/27 --> 12.9 5/31 stable  - continue 2:1 supervision, reassessed 5/31    # Multiple fractures. WBAT LLE, NWB LUE   - OK ROM exercises for L elbow, wrist, hand.  L shoulder pendulum exercises. Sling to LUE for comfort. No lifting more than 1-3 pounds for 6 weeks. Will follow with ortho re: possible upgrade WB/ROM (6/1)  - LSO brace whenever OOB. (replaced 5/14)  - buddy tape dc'd for L 5th finger dislocation (per Dr. Choe 5/10)  - Continue Lovenox for 6 weeks total (through 6/3)  - follow up on dc:  ·	plastic surgeon Dr. Nell Choe, for facial laceration, L pinky dislocation, L nasal bone and orbital rim fractures.   ·	ortho, Dr. Reji Cardoza, for L clavicle and L pelvic wing fractures  ·	surgery, Dr. Dino Peña, for rib fractures    # Acute Hypoxic Respiratory Failure likely due to Acute B/L rib fractures, and small pneumothorax  - Incentive spirometry. Breathing exercises  - stable on RA    # Elevated Alk Phos likely 2/2 bone fractures  - Hepatic sono 5/7: Multiple gallstones, without evidence for gallbladder wall thickening or pericholecystic fluid.  -  AST  17  /  ALT  24  /  AlkPhos  237<H>  05-24--> AST  16  /  ALT  20  /  AlkPhos  198<H>  05-27-->  AST  15  /  ALT  24  /  AlkPhos  179<H>  05-31 5/31 improving  - CMP 6/3    # H/o SVT  - Continue with toprol XL, reduced to 50 mg daily 5/24  - Can follow up in 3 months with EP Dr. Hernández [phone]. (elective ablation)    #R kidney lesion  -  consult appreciated. CT/sono reviewed, recommend surveillance with f/u sono ? MRI in future.   - F/u Dr. Herrera outpatient or PCP    #Hypothyroidism  - Continue Synthroid 112mcg daily    # soft BP 97/65 - 104/62 5/31  - hypernatremia Na 145, BUn 24/o.66  - encourage po fluids, also possibly due to reduced po intake  - repeat BMP in AM 6/1    # h/o breast CA  - was previously on tamoxifen, stopped when admitted following fall  - resume 20 mg daily    # Depression: stable/improved  - Psychiatry f/u 5/11 read and appreciated:   ·	Continue Lexapro 10mg  ·	Modafinil 50 mg dc 5/29 due to insomnia  ·	Trazodone 50 mg qhs    # insomnia  - currently on trazodone 50 qhs  - dc modafanil    # Pain  - Tylenol  - Lidoderm to L chest wall  - Scheduled Oxycodone reduced to 5 mg, improved    # GI/Bowel:  - Senna QHS, Miralax Daily    # /Bladder:   - Avila placed 4/29/21 for urinary retention. Failed TOV 5/6   - Urine Cx: E. coli: started on Nitrofurantoin, switched to CTX for 3 day course, finished 5/18  - UA 5/17 negative  -  f/u 5/31 read and appreciated  ·	Bethanechol- increased 25 TID 5/30  ·	Continue Flomax 0.4 mg daily  ·	failed TOV, avila replaced 5/27. Renew 5/31.  ·	TOV tomorrow 6/1    # Diet  - regular solids thin liquids  - 1:1 supervision for meals    # DVT ppx:  - Lovenox  - SCDs    # Case discussed in IDT 5/26/21:  - reduced balance, safety and insight. Requires supervision/set up for eating, min assist grooming and UB dressing, mod LB dressing, min assist toileting, CG transfers with cues, ambulates 60 feet WBQC min assist  - Goal: supervision grooming, bathing LBD, Mark shower transfers, 8 step with 1 HR and min assist. May upgrade goals if allowed WBAT LUE and can use walker  - Patient for JULI placement      #LABS  2:1 observation  avila. TOV 6/1  BMp 6/1  monitor insomnia  CBC BMP 6/3      ---------------  Outpatient Follow-up (Specialty/Name of physician):  Silvina Herrera  INTERNAL MEDICINE  [address] [place] [address]  Phone: [phone]  Fax: [phone]  Follow Up Time: 1 week    Vikas Aquino)  Neurosurgery  [address] [facility], [address] [place] [address]  Phone: [phone]  Fax: [phone]  Follow Up Time: 2 weeks    Nell Choe)  Plastic Surgery  [address] [facility], [address]  [place], [place] [id number]  Phone: [phone]  Fax: [phone]  Follow Up Time: 1 week    Reji Cardoza)  Orthopaedic Surgery  [address] [facility], [address]  [place] [address]  Phone: [phone]  Fax: [phone]  Follow Up Time: Routine    Dino Peña)  Surgery; Surgical Critical Care  [address] [facility], [address], [place] [address]  Phone: [phone]  Fax: [phone]  Follow Up Time: Routine    Morgan Hernández (MD; PhD)  Cardiac Electrophysiology; Cardiovascular Disease; Internal Medicine  [facility] - Dept of Cardiology, [address] [place] [address]  Phone: [phone]  Fax: [phone]

## 2021-05-31 NOTE — PROGRESS NOTE ADULT - SUBJECTIVE AND OBJECTIVE BOX
Patient is a 65y old  Female who presents with a chief complaint of SAH and fractures of left-sided ribs, left iliac wing with hematoma, S/P closed reduction of left pinky PIP dislocation after trauma (31 May 2021 09:13)    HPI:  65F PMHx breast cancer s/p mastectomy, HTN, hypothyroidism, not on full anticoagulation/antiplatelets per EMS, presented to [facility] 4/19/21as a level 2 trauma activation after a fall from a flight of stairs. Patient only able to say "ow" in trauma bay, unable to provide further history. However per son, normal mentation at baseline. In the trauma bay, airway was intact with bilateral breath sounds, O2 saturation 100% on room air. Initially blood pressure was 140s systolic however on repeat 90s. NS@100 initiated. Secondary significant for left forehead laceration, left chest wall tenderness, left pelvic tenderness.     Ms. Arthur was hypotensive in the trauma bay and was transferred to the Surgical ICU after imaging was obtained. Her injury list is below.  (1) SAH, bilateral: stable on repeat imaging. Seven day course of Keppra for post-traumatic seizure prophylaxis.  (2) left 3-8 and right 7th rib fractures with occult left pneumothorax: pain control  (3) left iliac wing fracture with hematoma: required transfusion early in her ICU course, after which CBC were stable  (4) left forehead laceration s/p suture repair  (5) s/p successful closed reduction of left pinky PIP dislocation with delfin splint  (6) left clavicle fracture  (7) left nasal bone and left orbital rim fractures  (8) sternal fracture  Incidental findings: right renal lesion. Pt's  was notified and copy of the report given to him.     The patient was admitted to Trauma Surgery under SICU care.  Ortho was consulted for L. iliac wing fx, sternal fx, L. clavicle fx- recommended WBAT of the affected left lower extremity with walker, WBAT L shoulder, pendulum exercises okay, no lifting, sling for comfort, Encourage ROM of elbow/wrist/hand, PT/OT consult, NO urgent orthopedic surgical intervention at this time.    Neurosurgery consulted- recommended repeat CTH, which was stable.     PRS was consulted for non displaced left superior orbital rim and non displaced left nasal bone fracture--> No indication for operative fixation of these non displaced fractures; recommend ice to face to aid in swelling and analgesia.    She was placed on hemorrhage watch in the SICU given pelvic fracture and surrounding hematoma.  Behavior health was consulted for delirium.    Hand was consulted for L. erlinda PIP dislocation- s/p closed reduction, rec Cont delfin taping for now.    4/21- CTH stable  - Added home lexapro  - Added NS @ 50cc/hr to supplement poor PO intake  - Lactate cleared (1.9)    4/22- Transfused 1u pRBC, Hct 20.9 -> 26.9  - Went into SVT, given adenosine x1 and started on metoprolol 25mg q12hr  - Added oxycodone 5mg q4hrs prn for increased pain control  - PO intake improved, IVL    4/23- EP was consulted for SVT, recc continue lopressor 25mg   Metoprolol PO increased from 25mg q12hrs to 25mg q6hrs.   - Added salt tabs 1g q8hrs and started on NS @ 50cc/hr for hyponatremia  - Avila discontinued, passed TOV  - Added lovenox  - Cervical collar cleared by confrontational exam     4/24- Patient transferred from SICU to surgical floor in stable condition.  Discharge planning to acute rehab.     Stable from neurologic perspective consistent with mild TBI  Multiple rib fractures - breathing comfortable with multimodal pain control    On day of discharge, the patient was tolerating diet, working with PT well and pain controlled. The patient was medically stable for discharge to acute rehab with instructions for outpatient follow up 4/27/21. (27 Apr 2021 13:27)    avila draining    MEDICATIONS:  MEDICATIONS  (STANDING):  bethanechol 25 milliGRAM(s) Oral every 8 hours  enoxaparin Injectable 40 milliGRAM(s) SubCutaneous every 24 hours  escitalopram 10 milliGRAM(s) Oral daily  folic acid 1 milliGRAM(s) Oral daily  levothyroxine 112 MICROGram(s) Oral daily  lidocaine   Patch 1 Patch Transdermal daily  metoprolol succinate ER 50 milliGRAM(s) Oral daily  multivitamin 1 Tablet(s) Oral daily  nystatin Powder 1 Application(s) Topical two times a day  oxyCODONE    IR 5 milliGRAM(s) Oral <User Schedule>  polyethylene glycol 3350 17 Gram(s) Oral daily  senna 2 Tablet(s) Oral at bedtime  tamoxifen 20 milliGRAM(s) Oral daily  tamsulosin 0.4 milliGRAM(s) Oral at bedtime  traZODone 50 milliGRAM(s) Oral at bedtime  zinc oxide 40% Ointment 1 Application(s) Topical two times a day    MEDICATIONS  (PRN):  acetaminophen   Tablet .. 650 milliGRAM(s) Oral every 6 hours PRN Temp greater or equal to 38C (100.4F), Mild Pain (1 - 3)      Allergies    Tapazole (Hives)    Intolerances        T(C): 36.8 (05-31-21 @ 07:35), Max: 36.9 (05-30-21 @ 20:16)  T(F): 98.2 (05-31-21 @ 07:35), Max: 98.4 (05-30-21 @ 20:16)  HR: 69 (05-31-21 @ 07:35) (66 - 76)  BP: 97/65 (05-31-21 @ 07:35) (97/65 - 119/80)  RR: 14 (05-31-21 @ 07:35) (14 - 16)  SpO2: 98% (05-31-21 @ 07:35) (93% - 98%)          05-29-21 @ 07:01  -  05-30-21 @ 07:00  --------------------------------------------------------  IN:  Total IN: 0 mL    OUT:    Indwelling Catheter - Urethral (mL): 1300 mL  Total OUT: 1300 mL    Total NET: -1300 mL      05-30-21 @ 07:01  -  05-31-21 @ 07:00  --------------------------------------------------------  IN:  Total IN: 0 mL    OUT:    Indwelling Catheter - Urethral (mL): 700 mL  Total OUT: 700 mL    Total NET: -700 mL      05-31-21 @ 07:01  -  05-31-21 @ 10:08  --------------------------------------------------------  IN:  Total IN: 0 mL    OUT:    Indwelling Catheter - Urethral (mL): 100 mL  Total OUT: 100 mL    Total NET: -100 mL          LABS:      CBC Full  -  ( 31 May 2021 05:00 )  WBC Count : 8.08 K/uL  RBC Count : 4.32 M/uL  Hemoglobin : 12.9 g/dL  Hematocrit : 39.8 %  Platelet Count - Automated : 193 K/uL  Mean Cell Volume : 92.1 fl  Mean Cell Hemoglobin : 29.9 pg  Mean Cell Hemoglobin Concentration : 32.4 gm/dL  Auto Neutrophil # : 5.86 K/uL  Auto Lymphocyte # : 1.54 K/uL  Auto Monocyte # : 0.54 K/uL  Auto Eosinophil # : 0.07 K/uL  Auto Basophil # : 0.04 K/uL  Auto Neutrophil % : 72.4 %  Auto Lymphocyte % : 19.1 %  Auto Monocyte % : 6.7 %  Auto Eosinophil % : 0.9 %  Auto Basophil % : 0.5 %    05-31    145  |  109<H>  |  24<H>  ----------------------------<  101<H>  4.4   |  24  |  0.66    Ca    9.1      31 May 2021 05:00    TPro  6.5  /  Alb  3.1<L>  /  TBili  0.4  /  DBili  x   /  AST  15  /  ALT  24  /  AlkPhos  179<H>  05-31                  Physical Exam    Constitutional: alert, no acute distress    Abdomen: soft, nontender, nondistended, no HSM    Genitourinary: no bladder distention, avila yellow, avila yellow

## 2021-05-31 NOTE — PROGRESS NOTE ADULT - ASSESSMENT
66 yo woman with PMHx breast cancer s/p mastectomy, HTN, Hypothyroidism admitted to [facility] after hospital course at [facility] after a fall from a flight of stairs, sustaining SAH, left-sided rib fractures, left iliac wing fracture with hematoma, left forehead laceration, s/p successful closed reduction of left pinky PIP dislocation, Acute L3 compression fracture, 4/29 admitted to [facility]    #Fall  #SAH  #Multiple fractures  -continue comprehensive rehabilitation program per PMR  -continue safety monitoring     #Urinary retention  #Constipation  -continue Flomax / urecholine  -will need Avila when discharged; avila placed 5/27/21  -continue bowel regimen  -Urology recommendations noted    #Normocytic Anemia with folic acid deficiency - stable  - monitor H/H    #Elevated ALKPHOS  -Due to multiple fractures   -Downtrending  -avoid hepatotoxins    #Episodes of SVT   -Metoprolol ER 50mg daily with holding parameters  -Can follow up in 3 months with EP Dr. Hernández [phone]. (elective ablation)    #Hypothyroidism  -Continue Synthroid    #VTE PPX   -Lovenox

## 2021-06-01 LAB
ANION GAP SERPL CALC-SCNC: 9 MMOL/L — SIGNIFICANT CHANGE UP (ref 5–17)
BUN SERPL-MCNC: 22 MG/DL — SIGNIFICANT CHANGE UP (ref 7–23)
CALCIUM SERPL-MCNC: 9.1 MG/DL — SIGNIFICANT CHANGE UP (ref 8.4–10.5)
CHLORIDE SERPL-SCNC: 110 MMOL/L — HIGH (ref 96–108)
CO2 SERPL-SCNC: 26 MMOL/L — SIGNIFICANT CHANGE UP (ref 22–31)
CREAT SERPL-MCNC: 0.64 MG/DL — SIGNIFICANT CHANGE UP (ref 0.5–1.3)
GLUCOSE SERPL-MCNC: 100 MG/DL — HIGH (ref 70–99)
POTASSIUM SERPL-MCNC: 4.9 MMOL/L — SIGNIFICANT CHANGE UP (ref 3.5–5.3)
POTASSIUM SERPL-SCNC: 4.9 MMOL/L — SIGNIFICANT CHANGE UP (ref 3.5–5.3)
SODIUM SERPL-SCNC: 145 MMOL/L — SIGNIFICANT CHANGE UP (ref 135–145)

## 2021-06-01 PROCEDURE — 99233 SBSQ HOSP IP/OBS HIGH 50: CPT

## 2021-06-01 PROCEDURE — 99232 SBSQ HOSP IP/OBS MODERATE 35: CPT

## 2021-06-01 RX ORDER — METOPROLOL TARTRATE 50 MG
25 TABLET ORAL DAILY
Refills: 0 | Status: DISCONTINUED | OUTPATIENT
Start: 2021-06-01 | End: 2021-06-02

## 2021-06-01 RX ADMIN — LIDOCAINE 1 PATCH: 4 CREAM TOPICAL at 00:30

## 2021-06-01 RX ADMIN — POLYETHYLENE GLYCOL 3350 17 GRAM(S): 17 POWDER, FOR SOLUTION ORAL at 11:31

## 2021-06-01 RX ADMIN — TAMSULOSIN HYDROCHLORIDE 0.4 MILLIGRAM(S): 0.4 CAPSULE ORAL at 21:16

## 2021-06-01 RX ADMIN — ZINC OXIDE 1 APPLICATION(S): 200 OINTMENT TOPICAL at 17:42

## 2021-06-01 RX ADMIN — NYSTATIN CREAM 1 APPLICATION(S): 100000 CREAM TOPICAL at 06:09

## 2021-06-01 RX ADMIN — ZINC OXIDE 1 APPLICATION(S): 200 OINTMENT TOPICAL at 06:09

## 2021-06-01 RX ADMIN — OXYCODONE HYDROCHLORIDE 5 MILLIGRAM(S): 5 TABLET ORAL at 09:58

## 2021-06-01 RX ADMIN — Medication 25 MILLIGRAM(S): at 06:08

## 2021-06-01 RX ADMIN — Medication 1 MILLIGRAM(S): at 11:29

## 2021-06-01 RX ADMIN — NYSTATIN CREAM 1 APPLICATION(S): 100000 CREAM TOPICAL at 17:43

## 2021-06-01 RX ADMIN — TAMOXIFEN CITRATE 20 MILLIGRAM(S): 20 TABLET, FILM COATED ORAL at 11:31

## 2021-06-01 RX ADMIN — Medication 25 MILLIGRAM(S): at 13:15

## 2021-06-01 RX ADMIN — LIDOCAINE 1 PATCH: 4 CREAM TOPICAL at 11:28

## 2021-06-01 RX ADMIN — Medication 1 TABLET(S): at 11:30

## 2021-06-01 RX ADMIN — SENNA PLUS 2 TABLET(S): 8.6 TABLET ORAL at 21:16

## 2021-06-01 RX ADMIN — Medication 25 MILLIGRAM(S): at 21:16

## 2021-06-01 RX ADMIN — Medication 50 MILLIGRAM(S): at 21:16

## 2021-06-01 RX ADMIN — Medication 112 MICROGRAM(S): at 06:08

## 2021-06-01 RX ADMIN — ENOXAPARIN SODIUM 40 MILLIGRAM(S): 100 INJECTION SUBCUTANEOUS at 06:25

## 2021-06-01 RX ADMIN — ESCITALOPRAM OXALATE 10 MILLIGRAM(S): 10 TABLET, FILM COATED ORAL at 11:30

## 2021-06-01 RX ADMIN — LIDOCAINE 1 PATCH: 4 CREAM TOPICAL at 18:20

## 2021-06-01 RX ADMIN — OXYCODONE HYDROCHLORIDE 5 MILLIGRAM(S): 5 TABLET ORAL at 07:53

## 2021-06-01 NOTE — PROGRESS NOTE ADULT - ASSESSMENT
JONATHAN CHILD is a 65y with PMHx breast Ca S/P mastectomy, HTN, hypothyroidism who presented to [facility] 4/19/21 s/p fall down a flight of stairs with bilateral frontoparietal SAH; L nasal bone and L orbital rim fractures; sternal fracture; L 3-8, R 7th rib fractures;  L pneumothorax, L iliac wing fracture with hematoma S/P 1U pRBCs; left clavicle fx, left V finger PIP dislocation s/p CR. She is WBAT Left LE, NWB Left UE.     # t SAH  - Avoid NSAIDs  - Modafanil dc'd 5/29 due to persistent insomnia  - Cont comprehensive rehab program, PT/OT/SLP 3 hours a day, 5 days a week. F/u neurosurgery, Dr. Vikas Aquino, upon discharge  - Precautions: falls, sternal , WB as above, AMS, cardiac, h/o breast CA    # S/P Fall 5/17/21 with head strike and lacerations left lateral brow and forehead  - CT head, CT cervical, CT C/A/P no acute findings 5/17.  - Spoke with Dr. Wheatley ([facility]) via transfer center, imaging reviewed and no indication for acute transfer.   - Plastics consulted and appreciated 5/17, and forehead laceration x 2 sutured  ·	steri strips are to be kept clean and dry and not to be removed before 6/2/21. Plastics to f/u this weekend  ·	from 5/34 -6/2/21, call plastic surgery on call for any emergency  - Repeat Head CT 12 hours post on 5/18 unremarkable  - Lovenox restarted 5/19. Hgb 12.4 5/24--> 12.3 5/27 --> 12.9 5/31 stable  - continue 2:1 supervision    # Multiple fractures. WBAT LLE, NWB LUE   - OK ROM exercises for L elbow, wrist, hand.  L shoulder pendulum exercises. Sling to LUE for comfort. No lifting more than 1-3 pounds for 6 weeks. Will follow with ortho re: possible upgrade WB/ROM (6/1)  - LSO brace whenever OOB. (replaced 5/14)  - buddy tape dc'd for L 5th finger dislocation (per Dr. Choe 5/10)  - Continue Lovenox for 6 weeks total (through 6/3)  - follow up on dc:  ·	plastic surgeon Dr. Nell Choe, for facial laceration, L pinky dislocation, L nasal bone and orbital rim fractures.   ·	ortho, Dr. Reji Cardoza, for L clavicle and L pelvic wing fractures  ·	surgery, Dr. Dino Peña, for rib fractures    # Acute Hypoxic Respiratory Failure likely due to Acute B/L rib fractures, and small pneumothorax  - Incentive spirometry. Breathing exercises  - stable on RA    # Elevated Alk Phos likely 2/2 bone fractures  - Hepatic sono 5/7: Multiple gallstones, without evidence for gallbladder wall thickening or pericholecystic fluid.  -  AST  17  /  ALT  24  /  AlkPhos  237<H>  05-24--> AST  16  /  ALT  20  /  AlkPhos  198<H>  05-27-->  AST  15  /  ALT  24  /  AlkPhos  179<H>  05-31 5/31 improving  - CMP 6/3    # H/o SVT  - Continue with toprol XL, reduced to 50 mg daily 5/24  - Can follow up in 3 months with EP Dr. Hernández [phone]. (elective ablation)    #R kidney lesion  -  consult appreciated. CT/sono reviewed, recommend surveillance with f/u sono ? MRI in future.   - F/u Dr. Herrera outpatient or PCP    #Hypothyroidism  - Continue Synthroid 112mcg daily    # Soft BP 97/65 - 104/62 5/31  - BMP 6/1 hypernatremia Na 145, BUN/Cr improved to 22/o.66  - BP stable  - encourage po fluids, also possibly due to reduced po intake    # h/o breast CA  - was previously on tamoxifen, stopped when admitted following fall  - resume 20 mg daily    # Depression: stable/improved  - Psychiatry f/u 5/11 read and appreciated:   ·	Continue Lexapro 10mg  ·	Modafinil 50 mg dc 5/29 due to insomnia  ·	Trazodone 50 mg qhs    # insomnia  - currently on trazodone 50 qhs; will consider increasing dose  - dc modafanil    # Pain  - Tylenol  - Lidoderm to L chest wall  - Scheduled Oxycodone reduced to 5 mg, improved    # GI/Bowel:  - Senna QHS, Miralax Daily    # /Bladder:   - Avila placed 4/29/21 for urinary retention. Failed TOV 5/6   - Urine Cx: E. coli: started on Nitrofurantoin, switched to CTX for 3 day course, finished 5/18  - UA 5/17 negative  -  f/u 5/31 read and appreciated  ·	Bethanechol- increased 25 TID 5/30  ·	Continue Flomax 0.4 mg daily  ·	failed TOV, avila replaced 5/27  ·	TOV 6/1    # Diet  - regular solids thin liquids  - 1:1 supervision for meals    # DVT ppx:  - Lovenox  - SCDs    # Case discussed in IDT 5/26/21:  - reduced balance, safety and insight. Requires supervision/set up for eating, min assist grooming and UB dressing, mod LB dressing, min assist toileting, CG transfers with cues, ambulates 60 feet WBQC min assist  - Goal: supervision grooming, bathing LBD, Mark shower transfers, 8 step with 1 HR and min assist. May upgrade goals if allowed WBAT LUE and can use walker  - Patient for JULI placement      #LABS  2:1 observation  avila. TOV 6/1  BMp 6/1  monitor insomnia  CBC BMP 6/3      ---------------  Outpatient Follow-up (Specialty/Name of physician):  Silvina Herrera  INTERNAL MEDICINE  [address] [place] [address]  Phone: [phone]  Fax: [phone]  Follow Up Time: 1 week    Vikas Aquino)  Neurosurgery  [address] [facility], [address] [place] [address]  Phone: [phone]  Fax: [phone]  Follow Up Time: 2 weeks    Nell Choe)  Plastic Surgery  [address] [facility], [address]  [place], [place] [id number]  Phone: [phone]  Fax: [phone]  Follow Up Time: 1 week    Reji Cardoza)  Orthopaedic Surgery  [address] [facility], [address]  [place] [address]  Phone: [phone]  Fax: [phone]  Follow Up Time: Routine    Dino Peña)  Surgery; Surgical Critical Care  [address] [facility], [address], [place] [address]  Phone: [phone]  Fax: [phone]  Follow Up Time: Routine    Morgan Hernández (MD; PhD)  Cardiac Electrophysiology; Cardiovascular Disease; Internal Medicine  [facility] - Dept of Cardiology, [address] [place] [address]  Phone: [phone]  Fax: [phone]   JONATHAN CHILD is a 65y with PMHx breast Ca S/P mastectomy, HTN, hypothyroidism who presented to [facility] 4/19/21 s/p fall down a flight of stairs with bilateral frontoparietal SAH; L nasal bone and L orbital rim fractures; sternal fracture; L 3-8, R 7th rib fractures;  L pneumothorax, L iliac wing fracture with hematoma S/P 1U pRBCs; left clavicle fx, left V finger PIP dislocation s/p CR. She is WBAT Left LE, NWB Left UE.     # t SAH  - Avoid NSAIDs  - Modafanil dc'd 5/29 due to persistent insomnia  - Cont comprehensive rehab program, PT/OT/SLP 3 hours a day, 5 days a week. F/u neurosurgery, Dr. Vikas Aquino, upon discharge  - Precautions: falls, sternal , WB as above, AMS, cardiac, h/o breast CA    # S/P Fall 5/17/21 with head strike and lacerations left lateral brow and forehead  - CT head, CT cervical, CT C/A/P no acute findings 5/17.  - Spoke with Dr. Wheatley ([facility]) via transfer center, imaging reviewed and no indication for acute transfer.   - Plastics consulted and appreciated 5/17, and forehead laceration x 2 sutured  ·	steri strips are to be kept clean and dry and not to be removed before 6/2/21. Plastics to f/u this weekend  ·	from 5/34 -6/2/21, call plastic surgery on call for any emergency  - Repeat Head CT 12 hours post on 5/18 unremarkable  - Lovenox restarted 5/19. Hgb 12.4 5/24--> 12.3 5/27 --> 12.9 5/31 stable  - continue 2:1 supervision    # Multiple fractures. WBAT LLE, NWB LUE   - OK ROM exercises for L elbow, wrist, hand.  L shoulder pendulum exercises. Sling to LUE for comfort. No lifting more than 1-3 pounds for 6 weeks. Will follow with ortho re: possible upgrade WB/ROM (6/1)  - LSO brace whenever OOB. (replaced 5/14)  - buddy tape dc'd for L 5th finger dislocation (per Dr. Choe 5/10)  - Continue Lovenox for 6 weeks total (through 6/3)  - follow up on dc:  ·	plastic surgeon Dr. Nell Choe, for facial laceration, L pinky dislocation, L nasal bone and orbital rim fractures.   ·	ortho, Dr. Reji Cardoza, for L clavicle and L pelvic wing fractures  ·	surgery, Dr. Dino Peña, for rib fractures    # Acute Hypoxic Respiratory Failure likely due to Acute B/L rib fractures, and small pneumothorax  - Incentive spirometry. Breathing exercises  - stable on RA    # Elevated Alk Phos likely 2/2 bone fractures  - Hepatic sono 5/7: Multiple gallstones, without evidence for gallbladder wall thickening or pericholecystic fluid.  -  AST  17  /  ALT  24  /  AlkPhos  237<H>  05-24--> AST  16  /  ALT  20  /  AlkPhos  198<H>  05-27-->  AST  15  /  ALT  24  /  AlkPhos  179<H>  05-31 5/31 improving  - CMP 6/3    # H/o SVT  - Continue with toprol XL, decrease to 25mg 6/1 with hold for HR <55.  - Can follow up in 3 months with EP Dr. Hernández [phone]. (elective ablation)    #R kidney lesion  -  consult appreciated. CT/sono reviewed, recommend surveillance with f/u sono ? MRI in future.   - F/u Dr. Herrera outpatient or PCP    #Hypothyroidism  - Continue Synthroid 112mcg daily    # Soft BP 97/65 - 104/62 5/31  - BMP 6/1 hypernatremia Na 145, BUN/Cr improved to 22/o.66  - BP stable  - encourage po fluids, also possibly due to reduced po intake    # h/o breast CA  - was previously on tamoxifen, stopped when admitted following fall  - resume 20 mg daily    # Depression: stable/improved  - Psychiatry f/u 5/11 read and appreciated:   ·	Continue Lexapro 10mg  ·	Modafinil 50 mg dc 5/29 due to insomnia  ·	Trazodone 50 mg qhs    # insomnia  - currently on trazodone 50 qhs; will consider increasing dose  - dc modafanil    # Pain  - Tylenol  - Lidoderm to L chest wall  - Scheduled Oxycodone reduced to 5 mg, improved    # GI/Bowel:  - Senna QHS, Miralax Daily    # /Bladder:   - Avila placed 4/29/21 for urinary retention. Failed TOV 5/6   - Urine Cx: E. coli: started on Nitrofurantoin, switched to CTX for 3 day course, finished 5/18  - UA 5/17 negative  -  f/u 5/31 read and appreciated  ·	Bethanechol- increased 25 TID 5/30  ·	Continue Flomax 0.4 mg daily  ·	failed TOV, avila replaced 5/27  ·	TOV 6/1    # Diet  - regular solids thin liquids  - 1:1 supervision for meals    # DVT ppx:  - Lovenox  - SCDs    # Case discussed in IDT 5/26/21:  - reduced balance, safety and insight. Requires supervision/set up for eating, min assist grooming and UB dressing, mod LB dressing, min assist toileting, CG transfers with cues, ambulates 60 feet WBQC min assist  - Goal: supervision grooming, bathing LBD, Mark shower transfers, 8 step with 1 HR and min assist. May upgrade goals if allowed WBAT LUE and can use walker  - Patient for JULI placement      #LABS  2:1 observation  monitor insomnia  CBC BMP 6/3      ---------------  Outpatient Follow-up (Specialty/Name of physician):  Silvina Herrera  INTERNAL MEDICINE  [address] [place] [address]  Phone: [phone]  Fax: [phone]  Follow Up Time: 1 week    Vikas Aquino)  Neurosurgery  [address] [facility], [address] [place] [address]  Phone: [phone]  Fax: [phone]  Follow Up Time: 2 weeks    Nell Choe)  Plastic Surgery  [address] [facility], [address]  [place], [place] [address]  Phone: [phone]  Fax: [phone]  Follow Up Time: 1 week    Reji Cardoza)  Orthopaedic Surgery  [address] [facility], [address]  [place] [address]  Phone: [phone]  Fax: [phone]  Follow Up Time: Routine    Dino Peña)  Surgery; Surgical Critical Care  [address] [facility], [address], [place] [address]  Phone: [phone]  Fax: [phone]  Follow Up Time: Routine    Morgan Hernández (MD; PhD)  Cardiac Electrophysiology; Cardiovascular Disease; Internal Medicine  [facility] - Dept of Cardiology, [address] [place] [address]  Phone: [phone]  Fax: [phone]   JONATHAN CHILD is a 65y with PMHx breast Ca S/P mastectomy, HTN, hypothyroidism who presented to [facility] 4/19/21 s/p fall down a flight of stairs with bilateral frontoparietal SAH; L nasal bone and L orbital rim fractures; sternal fracture; L 3-8, R 7th rib fractures;  L pneumothorax, L iliac wing fracture with hematoma S/P 1U pRBCs; left clavicle fx, left V finger PIP dislocation s/p CR. She is WBAT Left LE, NWB Left UE.     # t SAH  - Avoid NSAIDs  - Modafanil dc'd 5/29 due to persistent insomnia  - Cont comprehensive rehab program, PT/OT/SLP 3 hours a day, 5 days a week.   - F/u neurosurgery, Dr. Vikas Aquino, upon discharge  - Precautions: falls, sternal , WB as above, AMS, cardiac, h/o breast CA    # S/P Fall 5/17/21 with head strike and lacerations left lateral brow and forehead  - CT head, CT cervical, CT C/A/P no acute findings 5/17.  - Spoke with Dr. Wheatley ([facility]) via transfer center, imaging reviewed and no indication for acute transfer.   - Plastics consulted and appreciated 5/17, and forehead laceration x 2 sutured  ·	steri strips are to be kept clean and dry and not to be removed before 6/2/21. Plastics to f/u this weekend  ·	from 5/34 -6/2/21, call plastic surgery on call for any emergency  - Repeat Head CT 12 hours post on 5/18 unremarkable  - continue 2:1 supervision, assessed 6/1    # Multiple fractures. WBAT LLE, NWB LUE   - OK ROM exercises for L elbow, wrist, hand.  L shoulder pendulum exercises. Sling to LUE for comfort. No lifting more than 1-3 pounds for 6 weeks. Will follow with ortho re: possible upgrade WB/ROM (6/1)  - LSO brace whenever OOB. (replaced 5/14)  - buddy tape dc'd for L 5th finger dislocation (per Dr. Choe 5/10)  - Continue Lovenox for 6 weeks total (through 6/3)  - follow up on dc:  ·	plastic surgeon Dr. Nell Choe, for facial laceration, L pinky dislocation, L nasal bone and orbital rim fractures.   ·	ortho, Dr. Reji Cardoza, for L clavicle and L pelvic wing fractures  ·	surgery, Dr. Dino Peña, for rib fractures    # Acute Hypoxic Respiratory Failure likely due to Acute B/L rib fractures, and small pneumothorax  - Incentive spirometry. Breathing exercises  - stable on RA    # Elevated Alk Phos likely 2/2 bone fractures  - Hepatic sono 5/7: Multiple gallstones, without evidence for gallbladder wall thickening or pericholecystic fluid.  -   AST  15  /  ALT  24  /  AlkPhos  179<H>  05-31 5/31 improving  - CMP 6/3    # H/o SVT  - Continue with toprol XL, decrease to 25mg 6/1 with hold for HR <55. Discussed with hospitalist  - Can follow up in 3 months with EP Dr. Hernández [phone]. (elective ablation)    #R kidney lesion  -  consult appreciated. CT/sono reviewed, recommend surveillance with f/u sono ? MRI in future.   - F/u Dr. Herrera outpatient or PCP    #Hypothyroidism  - Continue Synthroid 112mcg daily    # Soft BP 97/65 - 104/62 5/31  - BMP 6/1: Na 145, BUN/Cr improved to 22/0.66  - encourage po fluids, also possibly due to reduced po intake    # h/o breast CA  - was previously on tamoxifen, stopped when admitted following fall  - resume 20 mg daily    # Depression: stable/improved  - Psychiatry f/u 5/11 read and appreciated:   ·	Continue Lexapro 10mg  ·	Modafinil 50 mg dc 5/29 due to insomnia  ·	Trazodone 50 mg qhs    # insomnia  - currently on trazodone 50 qhs; will consider increasing dose  - dc modafanil  - TOV today, avila may have been contributing to restlessness as well    # Pain  - Tylenol  - Lidoderm to L chest wall  - Scheduled Oxycodone reduced to 5 mg, improved    # GI/Bowel:  - Senna QHS, Miralax Daily    # /Bladder:   - Avila placed 4/29/21 for urinary retention. Failed TOV 5/6   - Urine Cx: E. coli: started on Nitrofurantoin, switched to CTX for 3 day course, finished 5/18  - UA 5/17 negative  -  f/u 5/31 read and appreciated  ·	Bethanechol- increased 25 TID 5/30  ·	Continue Flomax 0.4 mg daily  ·	failed TOV, avila replaced 5/27  ·	TOV 6/1, reviewed with team. Bladder scan q 6 hours, SC for PVR >350 ml    # Diet  - regular solids thin liquids  - 1:1 supervision for meals    # DVT ppx:  - Lovenox  - SCDs    # Case discussed in IDT 5/26/21:  - reduced balance, safety and insight. Requires supervision/set up for eating, min assist grooming and UB dressing, mod LB dressing, min assist toileting, CG transfers with cues, ambulates 60 feet WBQC min assist  - Goal: supervision grooming, bathing LBD, Mark shower transfers, 8 step with 1 HR and min assist. May upgrade goals if allowed WBAT LUE and can use walker  - Patient for JULI placement      #LABS  2:1 observation  bladder scan  orthopedic f/u re: WB  monitor insomnia, BP  CBC BMP 6/3      ---------------  Outpatient Follow-up (Specialty/Name of physician):  Silvina Herrera  INTERNAL MEDICINE  [address] [place] [address]  Phone: [phone]  Fax: [phone]  Follow Up Time: 1 week    Vikas Aquino)  Neurosurgery  [address] [facility], [address] [place] [address]  Phone: [phone]  Fax: [phone]  Follow Up Time: 2 weeks    Nell Choe)  Plastic Surgery  [address] [facility], [address]  [place], [place] [id number]  Phone: [phone]  Fax: [phone]  Follow Up Time: 1 week    Reji Cardoza)  Orthopaedic Surgery  [address] [facility], [address]  [place] [address]  Phone: [phone]  Fax: [phone]  Follow Up Time: Routine    Dino Peña)  Surgery; Surgical Critical Care  [address] [facility], [address], [place] [address]  Phone: [phone]  Fax: [phone]  Follow Up Time: Routine    Morgan Hernández (MD; PhD)  Cardiac Electrophysiology; Cardiovascular Disease; Internal Medicine  [facility] - Dept of Cardiology, [address] [place] [address]  Phone: [phone]  Fax: [phone]

## 2021-06-01 NOTE — PROGRESS NOTE ADULT - ATTENDING COMMENTS
Progress note amended to include my discussions with patient, resident, hospitalist, RN, SW, PT and my findings. Orthopedics called and will place call to orthotist for replacement LSO. To keep therapies in bed until received. left breast/chest wall pain evaluated, reproducible along ribs, breast and skin exam benign. Reviewed with patient. Vitals reviewed, stable
Pt. seen with resident.  Agree with documentation above as per resident with amendments made as appropriate. Patient medically stable. Making progress towards rehab goals.     TBI, multitrauma s/p fall  L3 acute vertebral body fracture-- no numbness, left hip motor 4/5, 5/5 knee extension.  Acute urinary retention-- no void.  SC Vol > 1000.  Spangler placed  MRI Lumbar spine done this afternoon-- results reviewed--  mild mid lumbar dextroscoliosis. There is mild edema related to right-sided upper central endplate compression of L3. There is minimal right-sided wedging without subluxation or retropulsion or epidural collection. Vertebral body height and signal elsewhere is preserved. Nondisplaced linear fracture with edema involving the left sacral ala.    The conus is normal. Moderately severe degenerative disc change and extends from L1-L2 through L4-L5. There is slight dorsal osteophyte from L1-L2 through L3-L4 exerting negligible mass effect on the ventral thecal sac without spinal stenosis or focal disc protrusion. Far lateral osteophyte contributes to mild right-sided foraminal stenosis at L4-5.    IMPRESSION:  Acute to subacute benign compression fracture of L3 and left sacral insufficiency fracture.    Will need TLSO OOB and to participate in therapy-- PT and OT held today.  Orthotist to come this afternoon with TLSO brace as per orthopedics.
Progress note amended to include my discussions with patient, resident, hospitalist, PT. Patient with slight drop in Hgb but within her typical range, will repeat CBC in AM as restarted on lovenox. motor exam performed , stable, and pain assessed. avila renewed due to retention/failed TOV, and to continue neuro checks for now. Will hold off on adding further sedating meds; may need to consider whether patient requires both provigil and SSRI given restlessness and insomnia
Pt. seen with resident.  Agree with documentation above as per resident with amendments made as appropriate. Patient medically stable. Making progress towards rehab goals.     TBI, multitrauma s/p fall  L3 acute vertebral body fracture--  MRI Lumbar spine 4/29-- results reviewed--  mild mid lumbar dextroscoliosis. There is mild edema related to right-sided upper central endplate compression of L3. There is minimal right-sided wedging without subluxation or retropulsion or epidural collection. Vertebral body height and signal elsewhere is preserved. Nondisplaced linear fracture with edema involving the left sacral ala.    The conus is normal. Moderately severe degenerative disc change and extends from L1-L2 through L4-L5. There is slight dorsal osteophyte from L1-L2 through L3-L4 exerting negligible mass effect on the ventral thecal sac without spinal stenosis or focal disc protrusion. Far lateral osteophyte contributes to mild right-sided foraminal stenosis at L4-5.    IMPRESSION:  Acute to subacute benign compression fracture of L3 and left sacral insufficiency fracture.    Orthopedics consult appreciated-- Will need LSO OOB and to participate in therapy-- received brace from orthotist yesterday-- Can resume PT and OT     --Depression-- family reports concern of worsening mood-- pt. on lexapro-- Psychiatry consulted.     Cont. Spangler for acute urinary retention
Progress note amended to include my discussions with patient, resident, hospitalist, RN, SW, PT and my findings. Patient stable, comfortable currently on oxycodone but continues to retain. No complaints of pelvic pain or dysuria, requires SC.  consult pending. Vitals stable
Progress note amended to include my discussions with patient, resident, hospitalist, RN, SW, PT and my findings. Restlessness noted, due to aphasia patient unable to clarify although denies pain. Seems more perseverative than previously, constantly weight-shifting in chair. Abx switched to CTX from macrodantin, afebrile and labs and vitals stable. will repeat UA as restlessness is new and required SC over weekend per patient report
Progress note amended to include my discussions with patient, resident, hospitalist, RN, SW, PT and my findings. Results of today's Head CT reviewed. Patietn continues to be alert, baseline mental status with no new neurological deficits. Hgb stable, and lacerations sutured by plastics, report from 5/17 read and greatly appreciated. Will plan on restarting lovenox tomorrow, recheck Hgb and continue neurochecks although reduced to q4. Will place on enhanced supervision 2:1 and replace JAQUELINE avila appreciated
Pt. seen with resident.  Agree with documentation above as per resident with amendments made as appropriate. Patient medically stable. Making progress towards rehab goals.     TBI multitrauma  Alk phos elevated--improved today, GGT normal.  Likely due to fractures  Hepatic sono reviewed: hepatic steatosis. No focal hepatic masses identified. Multiple gallstones, without evidence for gallbladder wall thickening or pericholecystic fluid  Monitor ALk phos.     Spangler removed last night-- toileting program-- no void this AM,  in afternoon voided with -- SC for 600cc.  Cont. to monitor voiding-- if not improving after tomorrow consult Urology.  Can increase bethanachol after few days    Called pt's spouse to provide an update but no answer.  Left message.
Progress note amended to include my discussions with patient, resident, hospitalist, RN, SW, PT and my findings. Ambulation, reduced balance, impulsivity noted, continues on 2:1 observation although less restless overall. Spangler remains due to repeated failed voiding trials and discomfort. Sleep still an issue, ramelteon switched to low dose trazodone. Vitals reviewed. Target dc JULI next week, possible 5/24
Progress note amended to include my discussions with patient, resident, hospitalist, RN, SW, PT and my findings. Orthostasis reviewed with staff and hosptialist, will reduce lopressor and adjust parameters. Encourage po fluids. Will dc avila to begin TOV during day, mobilize and monitor; hold increasing trazodone to assess sleep off avila. Will also f/u ortho re: increasing ROm/WB to left UE
Progress note amended to include my discussions with patient, resident, hospitalist, RN, SW, PT and my findings. Pain level evaluated with team, persistent retention despite adjustment bethanechol discussed and darrell seek  consult. labs reviewed, stable, to continue oxycodone for now for multiple fracture pain. labs including alk phos and vitals reviewed, to provide letter for  requesting time from work /level of assist needed, lovenox education also required

## 2021-06-01 NOTE — PROGRESS NOTE ADULT - ASSESSMENT
66 yo woman with PMHx breast cancer s/p mastectomy, HTN, Hypothyroidism admitted to [facility] after hospital course at [facility] after a fall from a flight of stairs, sustaining SAH, left-sided rib fractures, left iliac wing fracture with hematoma, left forehead laceration, s/p successful closed reduction of left pinky PIP dislocation, Acute L3 compression fracture, 4/29 admitted to [facility]    #Fall  #SAH  #Multiple fractures  -continue comprehensive rehabilitation program per PMR  -continue safety monitoring     #Urinary retention  #Constipation  -continue Flomax / urecholine  - TOV today 6/1  -continue bowel regimen  -Urology recommendations noted    #Normocytic Anemia with folic acid deficiency - stable  - monitor H/H    #Elevated ALKPHOS  -Due to multiple fractures   -Downtrending  -avoid hepatotoxins    #Episodes of SVT   - On Metoprolol ER 50mg daily with holding parameters. Suggest Reduce dose to Metoprolol ER 25 mg daily as BP and HR lower side. held am dose today 6/1 due to HR <60  -Can follow up in 3 months with EP Dr. Hernández [phone]. (elective ablation)    #Hypothyroidism  -Continue Synthroid    # Adjustment disorder with depressed mood    # insomnia  -  currently off modafinil   - on Trazodone    Acute Hypoxic Respiratory Failure likely due to Acute B/L rib fractures, and small pneumothorax  - resolved  - Incentive spirometry.   - Supplemental Oxygen as needed for O2 goal > 92%    # Moderate protein-calorie malnutrition.  # hypoalbuminemia  - on Ensure  - Nutrition eval ongoing    #VTE PPX   -Lovenox    d/w Dr. Underwood and RN in [facility]

## 2021-06-01 NOTE — PROGRESS NOTE ADULT - SUBJECTIVE AND OBJECTIVE BOX
DAILY PROGRESS NOTE:  HPI:  65F PMHx breast cancer s/p mastectomy, HTN, hypothyroidism, not on full anticoagulation/antiplatelets per EMS, presented to [facility] 21as a level 2 trauma activation after a fall from a flight of stairs. Patient only able to say "ow" in trauma bay, unable to provide further history. However per son, normal mentation at baseline. In the trauma bay, airway was intact with bilateral breath sounds, O2 saturation 100% on room air. Initially blood pressure was 140s systolic however on repeat 90s. NS@100 initiated. Secondary significant for left forehead laceration, left chest wall tenderness, left pelvic tenderness.     Ms. Arthur was hypotensive in the trauma bay and was transferred to the Surgical ICU after imaging was obtained. Her injury list is below.  (1) SAH, bilateral: stable on repeat imaging. Seven day course of Keppra for post-traumatic seizure prophylaxis.  (2) left 3-8 and right 7th rib fractures with occult left pneumothorax: pain control  (3) left iliac wing fracture with hematoma: required transfusion early in her ICU course, after which CBC were stable  (4) left forehead laceration s/p suture repair  (5) s/p successful closed reduction of left pinky PIP dislocation with delfin splint  (6) left clavicle fracture  (7) left nasal bone and left orbital rim fractures  (8) sternal fracture  Incidental findings: right renal lesion. Pt's  was notified and copy of the report given to him.     The patient was admitted to Trauma Surgery under SICU care.  Ortho was consulted for L. iliac wing fx, sternal fx, L. clavicle fx- recommended WBAT of the affected left lower extremity with walker, WBAT L shoulder, pendulum exercises okay, no lifting, sling for comfort, Encourage ROM of elbow/wrist/hand, PT/OT consult, NO urgent orthopedic surgical intervention at this time.    Neurosurgery consulted- recommended repeat CTH, which was stable.     PRS was consulted for non displaced left superior orbital rim and non displaced left nasal bone fracture--> No indication for operative fixation of these non displaced fractures; recommend ice to face to aid in swelling and analgesia.    She was placed on hemorrhage watch in the SICU given pelvic fracture and surrounding hematoma.  Behavior health was consulted for delirium.    Hand was consulted for L. pinky PIP dislocation- s/p closed reduction, rec Cont delfin taping for now.    - CTH stable  - Added home lexapro  - Added NS @ 50cc/hr to supplement poor PO intake  - Lactate cleared (1.9)    - Transfused 1u pRBC, Hct 20.9 -> 26.9  - Went into SVT, given adenosine x1 and started on metoprolol 25mg q12hr  - Added oxycodone 5mg q4hrs prn for increased pain control  - PO intake improved, IVL    - EP was consulted for SVT, recc continue lopressor 25mg   Metoprolol PO increased from 25mg q12hrs to 25mg q6hrs.   - Added salt tabs 1g q8hrs and started on NS @ 50cc/hr for hyponatremia  - Spangler discontinued, passed TOV  - Added lovenox  - Cervical collar cleared by confrontational exam     - Patient transferred from SICU to surgical floor in stable condition.  Discharge planning to acute rehab.     Stable from neurologic perspective consistent with mild TBI  Multiple rib fractures - breathing comfortable with multimodal pain control    On day of discharge, the patient was tolerating diet, working with PT well and pain controlled. The patient was medically stable for discharge to acute rehab with instructions for outpatient follow up 21. (2021 13:27)      Recent Labs/Imagin.9   8.08  )-----------( 193      ( 31 May 2021 05:00 )             39.8     06-01    145  |  110<H>  |  22  ----------------------------<  100<H>  4.9   |  26  |  0.64    Ca    9.1      2021 05:00    TPro  6.5  /  Alb  3.1<L>  /  TBili  0.4  /  DBili  x   /  AST  15  /  ALT  24  /  AlkPhos  179<H>        Medications:  acetaminophen   Tablet .. 650 milliGRAM(s) Oral every 6 hours PRN  bethanechol 25 milliGRAM(s) Oral every 8 hours  enoxaparin Injectable 40 milliGRAM(s) SubCutaneous every 24 hours  escitalopram 10 milliGRAM(s) Oral daily  folic acid 1 milliGRAM(s) Oral daily  levothyroxine 112 MICROGram(s) Oral daily  lidocaine   Patch 1 Patch Transdermal daily  metoprolol succinate ER 25 milliGRAM(s) Oral daily  multivitamin 1 Tablet(s) Oral daily  nystatin Powder 1 Application(s) Topical two times a day  oxyCODONE    IR 5 milliGRAM(s) Oral <User Schedule>  polyethylene glycol 3350 17 Gram(s) Oral daily  senna 2 Tablet(s) Oral at bedtime  tamoxifen 20 milliGRAM(s) Oral daily  tamsulosin 0.4 milliGRAM(s) Oral at bedtime  traZODone 50 milliGRAM(s) Oral at bedtime  zinc oxide 40% Ointment 1 Application(s) Topical two times a day    Vital Signs Last 24 Hrs  T(C): 36.7 (21 @ 07:56), Max: 36.9 (21 @ 20:43)  T(F): 98.1 (21 @ 07:56), Max: 98.4 (21 @ 20:43)  HR: 68 (21 @ 07:56) (56 - 73)  BP: 105/68 (21 @ 07:56) (105/68 - 114/72)  BP(mean): --  RR: 14 (21 @ 07:56) (14 - 14)  SpO2: 99% (21 @ 07:56) (95% - 99%)      Review of Systems:   · Additional ROS	Patient was seen and examined at the bedside this AM. Sitting in chair during encounter. No acute overnight events; no acute events over the weekend.    Patient endorsed insomnia, but having trouble expressing the reason. Speech is slowed and affect if flat. Reported no other complaints.     Physical Exam:   · Constitutional	detailed exam  · Constitutional Details	no distress  · Constitutional Comments	 GCS=14 (E4V4M6)     laceration healing well, steri strips starting to curl at ends but intact  dry, ecchymoses nearly resolved    	  	  · Respiratory	detailed exam  · Respiratory Details	breath sounds equal; respirations non-labored  LSO in place  · Cardiovascular	detailed exam  · Cardiovascular Details	regular rate and rhythm  · Gastrointestinal	detailed exam  · GI Normal	nontender; no distention; bowel sounds normal  	no suprapubic fullness or TTP  · Extremities	calves soft, NT no erythema or warmth bialterally   DAILY PROGRESS NOTE:  HPI:  65F PMHx breast cancer s/p mastectomy, HTN, hypothyroidism, not on full anticoagulation/antiplatelets per EMS, presented to [facility] 21as a level 2 trauma activation after a fall from a flight of stairs. Patient only able to say "ow" in trauma bay, unable to provide further history. However per son, normal mentation at baseline. In the trauma bay, airway was intact with bilateral breath sounds, O2 saturation 100% on room air. Initially blood pressure was 140s systolic however on repeat 90s. NS@100 initiated. Secondary significant for left forehead laceration, left chest wall tenderness, left pelvic tenderness.     Ms. Arthur was hypotensive in the trauma bay and was transferred to the Surgical ICU after imaging was obtained. Her injury list is below.  (1) SAH, bilateral: stable on repeat imaging. Seven day course of Keppra for post-traumatic seizure prophylaxis.  (2) left 3-8 and right 7th rib fractures with occult left pneumothorax: pain control  (3) left iliac wing fracture with hematoma: required transfusion early in her ICU course, after which CBC were stable  (4) left forehead laceration s/p suture repair  (5) s/p successful closed reduction of left pinky PIP dislocation with delfin splint  (6) left clavicle fracture  (7) left nasal bone and left orbital rim fractures  (8) sternal fracture  Incidental findings: right renal lesion. Pt's  was notified and copy of the report given to him.     The patient was admitted to Trauma Surgery under SICU care.  Ortho was consulted for L. iliac wing fx, sternal fx, L. clavicle fx- recommended WBAT of the affected left lower extremity with walker, WBAT L shoulder, pendulum exercises okay, no lifting, sling for comfort, Encourage ROM of elbow/wrist/hand, PT/OT consult, NO urgent orthopedic surgical intervention at this time.    Neurosurgery consulted- recommended repeat CTH, which was stable.     PRS was consulted for non displaced left superior orbital rim and non displaced left nasal bone fracture--> No indication for operative fixation of these non displaced fractures; recommend ice to face to aid in swelling and analgesia.    She was placed on hemorrhage watch in the SICU given pelvic fracture and surrounding hematoma.  Behavior health was consulted for delirium.    Hand was consulted for L. pinky PIP dislocation- s/p closed reduction, rec Cont delfin taping for now.    - CTH stable  - Added home lexapro  - Added NS @ 50cc/hr to supplement poor PO intake  - Lactate cleared (1.9)    - Transfused 1u pRBC, Hct 20.9 -> 26.9  - Went into SVT, given adenosine x1 and started on metoprolol 25mg q12hr  - Added oxycodone 5mg q4hrs prn for increased pain control  - PO intake improved, IVL    - EP was consulted for SVT, recc continue lopressor 25mg   Metoprolol PO increased from 25mg q12hrs to 25mg q6hrs.   - Added salt tabs 1g q8hrs and started on NS @ 50cc/hr for hyponatremia  - Spangler discontinued, passed TOV  - Added lovenox  - Cervical collar cleared by confrontational exam     - Patient transferred from SICU to surgical floor in stable condition.  Discharge planning to acute rehab.     Stable from neurologic perspective consistent with mild TBI  Multiple rib fractures - breathing comfortable with multimodal pain control    On day of discharge, the patient was tolerating diet, working with PT well and pain controlled. The patient was medically stable for discharge to acute rehab with instructions for outpatient follow up 21. (2021 13:27)      Recent Labs/Imagin.9   8.08  )-----------( 193      ( 31 May 2021 05:00 )             39.8     06-01    145  |  110<H>  |  22  ----------------------------<  100<H>  4.9   |  26  |  0.64    Ca    9.1      2021 05:00    TPro  6.5  /  Alb  3.1<L>  /  TBili  0.4  /  DBili  x   /  AST  15  /  ALT  24  /  AlkPhos  179<H>        Medications:  acetaminophen   Tablet .. 650 milliGRAM(s) Oral every 6 hours PRN  bethanechol 25 milliGRAM(s) Oral every 8 hours  enoxaparin Injectable 40 milliGRAM(s) SubCutaneous every 24 hours  escitalopram 10 milliGRAM(s) Oral daily  folic acid 1 milliGRAM(s) Oral daily  levothyroxine 112 MICROGram(s) Oral daily  lidocaine   Patch 1 Patch Transdermal daily  metoprolol succinate ER 25 milliGRAM(s) Oral daily  multivitamin 1 Tablet(s) Oral daily  nystatin Powder 1 Application(s) Topical two times a day  oxyCODONE    IR 5 milliGRAM(s) Oral <User Schedule>  polyethylene glycol 3350 17 Gram(s) Oral daily  senna 2 Tablet(s) Oral at bedtime  tamoxifen 20 milliGRAM(s) Oral daily  tamsulosin 0.4 milliGRAM(s) Oral at bedtime  traZODone 50 milliGRAM(s) Oral at bedtime  zinc oxide 40% Ointment 1 Application(s) Topical two times a day    Vital Signs Last 24 Hrs  T(C): 36.7 (21 @ 07:56), Max: 36.9 (21 @ 20:43)  T(F): 98.1 (21 @ 07:56), Max: 98.4 (21 @ 20:43)  HR: 68 (21 @ 07:56) (56 - 73)  BP: 105/68 (21 @ 07:56) (105/68 - 114/72)  BP(mean): --  RR: 14 (21 @ 07:56) (14 - 14)  SpO2: 99% (21 @ 07:56) (95% - 99%)      Review of Systems:   · Additional ROS	Patient was seen and examined at the bedside this AM. Sitting in chair during encounter. No acute overnight events; no acute events over the weekend.    Patient endorsed insomnia, but having trouble expressing the reason. Speech is slowed and affect if flat. Reported no other complaints. Reduced prosody. some perseveration    Physical Exam:   · Constitutional	detailed exam  · Constitutional Details	no distress  · Constitutional Comments	 GCS=14 (E4V4M6)     laceration healing well, steri strips starting to curl at ends but intact  dry, ecchymoses nearly resolved periorbital regin and forehead, no hematoma    	  	  · Respiratory	detailed exam  · Respiratory Details	breath sounds equal; respirations non-labored  LSO in place  · Cardiovascular	detailed exam  · Cardiovascular Details	regular rate and rhythm  · Gastrointestinal	detailed exam  · GI Normal	nontender; no distention; bowel sounds normal  	no suprapubic fullness or TTP  · Extremities	calves soft, NT no erythema or warmth bialterally

## 2021-06-01 NOTE — CHART NOTE - NSCHARTNOTEFT_GEN_A_CORE
Nutrition Follow Up Note  Hospital Course (Per Electronic Medical Record): 66 yo woman with PMHx breast cancer s/p mastectomy, HTN, Hypothyroidism admitted to [facility] after hospital course at [facility] after a fall from a flight of stairs, sustaining SAH, left-sided rib fractures, left iliac wing fracture with hematoma, left forehead laceration, s/p successful closed reduction of left pinky PIP dislocation, Acute L3 compression fracture, 4/29 admitted to [facility]  Source: Medical Record [X] Patient [X] Family [ ]         Diet: Regular, Kosher, Ensure Enlive TID  Pt tolerating diet with fair-good PO intake. Spoke with pt after breakfast, reports eating an omelet which she enjoyed. Pt has good acceptance of Ensure Enlive supplement.    Current Weight: 127.8 lbs (6/1) weighed today via bed scale  126.1 lbs (5/24)  125.8 lbs (5/23)  130 lbs (5/15)  130.2 lbs (5/14)  131.8 lbs (5/9)  135.1 lbs (4/30)  133.1 lbs (4/28)      Pertinent Medications: MEDICATIONS  (STANDING):  bethanechol 25 milliGRAM(s) Oral every 8 hours  enoxaparin Injectable 40 milliGRAM(s) SubCutaneous every 24 hours  escitalopram 10 milliGRAM(s) Oral daily  folic acid 1 milliGRAM(s) Oral daily  levothyroxine 112 MICROGram(s) Oral daily  lidocaine   Patch 1 Patch Transdermal daily  metoprolol succinate ER 25 milliGRAM(s) Oral daily  multivitamin 1 Tablet(s) Oral daily  nystatin Powder 1 Application(s) Topical two times a day  oxyCODONE    IR 5 milliGRAM(s) Oral <User Schedule>  polyethylene glycol 3350 17 Gram(s) Oral daily  senna 2 Tablet(s) Oral at bedtime  tamoxifen 20 milliGRAM(s) Oral daily  tamsulosin 0.4 milliGRAM(s) Oral at bedtime  traZODone 50 milliGRAM(s) Oral at bedtime  zinc oxide 40% Ointment 1 Application(s) Topical two times a day    MEDICATIONS  (PRN):  acetaminophen   Tablet .. 650 milliGRAM(s) Oral every 6 hours PRN Temp greater or equal to 38C (100.4F), Mild Pain (1 - 3)      Pertinent Labs:  06-01 Na145 mmol/L Glu 100 mg/dL<H> K+ 4.9 mmol/L Cr  0.64 mg/dL BUN 22 mg/dL 05-31 Alb 3.1 g/dL<L>        Skin: bruised, surgical incision upper forehead, L forehead per nursing flow sheets    Edema: none per nursing flow sheets    Last BM: on 5/31 per nursing flow sheets    Estimated Needs:   [X] No Change since Previous Assessment  [ ] Recalculated:     Previous Nutrition Diagnosis:   Inadequate oral intake  Moderate malnutrition    Nutrition Diagnosis is [X] Ongoing  - addressed with diet, oral nutrition supplements, food preferences obtained.   [ ] Resolved   [ ] Not Applicable      New Nutrition Diagnosis: [X] Not Applicable  [ ] Inadequate Protein Energy Intake   [ ] Inadequate Oral Intake   [ ] Excessive Energy Intake   [ ] Increased Nutrient Needs   [ ] Obesity   [ ] Altered GI Function   [ ] Unintended Weight Loss   [ ] Food & Nutrition Related Knowledge Deficit  [ ] Limited Adherence to nutrition related recommendations   [ ] Malnutrition      Interventions:   1. Recommend continue with current diet  2. Encourage PO intake  3. Recommend obtaining weight via zero'd bed scale 3x/week   4. Obtain and honor food preferences    Monitoring & Evaluation:   [X] Weights   [X] PO Intake   [X] Follow Up (Per Protocol)  [X] Tolerance to Diet Prescription   [X] Other: Labs    RD Remains Available.  Diana Sandhu RD

## 2021-06-01 NOTE — PROGRESS NOTE ADULT - SUBJECTIVE AND OBJECTIVE BOX
Medicine Progress Note    Patient is a 66y old  Female who presents with a chief complaint of SAH and fractures of left-sided ribs, left iliac wing with hematoma, S/P closed reduction of left pinky PIP dislocation after trauma (31 May 2021 10:12)      SUBJECTIVE / OVERNIGHT EVENTS:  seen and examined  Chart reviewed  No overnight events  Limb weakness improving with therapy  BM+  No pain  HR lower normal. on lopressor      ADDITIONAL REVIEW OF SYSTEMS:  no fever/chills/CP/sob/palpitation/dizziness/ abd pain/nausea/vomiting/diarrhea/constipation/headaches. all other ROS neg    MEDICATIONS  (STANDING):  bethanechol 25 milliGRAM(s) Oral every 8 hours  enoxaparin Injectable 40 milliGRAM(s) SubCutaneous every 24 hours  escitalopram 10 milliGRAM(s) Oral daily  folic acid 1 milliGRAM(s) Oral daily  levothyroxine 112 MICROGram(s) Oral daily  lidocaine   Patch 1 Patch Transdermal daily  metoprolol succinate ER 50 milliGRAM(s) Oral daily  multivitamin 1 Tablet(s) Oral daily  nystatin Powder 1 Application(s) Topical two times a day  oxyCODONE    IR 5 milliGRAM(s) Oral <User Schedule>  polyethylene glycol 3350 17 Gram(s) Oral daily  senna 2 Tablet(s) Oral at bedtime  tamoxifen 20 milliGRAM(s) Oral daily  tamsulosin 0.4 milliGRAM(s) Oral at bedtime  traZODone 50 milliGRAM(s) Oral at bedtime  zinc oxide 40% Ointment 1 Application(s) Topical two times a day    MEDICATIONS  (PRN):  acetaminophen   Tablet .. 650 milliGRAM(s) Oral every 6 hours PRN Temp greater or equal to 38C (100.4F), Mild Pain (1 - 3)    CAPILLARY BLOOD GLUCOSE        I&O's Summary    31 May 2021 07:01  -  01 Jun 2021 07:00  --------------------------------------------------------  IN: 0 mL / OUT: 975 mL / NET: -975 mL        PHYSICAL EXAM:  Vital Signs Last 24 Hrs  T(C): 36.7 (01 Jun 2021 07:56), Max: 36.9 (31 May 2021 20:43)  T(F): 98.1 (01 Jun 2021 07:56), Max: 98.4 (31 May 2021 20:43)  HR: 68 (01 Jun 2021 07:56) (56 - 73)  BP: 105/68 (01 Jun 2021 07:56) (105/68 - 114/72)  BP(mean): --  RR: 14 (01 Jun 2021 07:56) (14 - 14)  SpO2: 99% (01 Jun 2021 07:56) (95% - 99%)      GENERAL: Not in distress. Alert    HEENT: no pallor or icterus. MMM  CARDIOVASCULAR: RRR S1, S2. No murmur/rubs/gallop  LUNGS: BLAE+, no rales, no wheezing, no rhonchi.    ABDOMEN: ND. Soft,  NT, no guarding / rebound / rigidity. BS normoactive.   EXTREMITIES: no edema. no leg or calf TP.  NEUROLOGIC: Alert. awake. .moving limbs  PSYCHIATRIC: Calm.  No agitation.    LABS:                        12.9   8.08  )-----------( 193      ( 31 May 2021 05:00 )             39.8     06-01    145  |  110<H>  |  22  ----------------------------<  100<H>  4.9   |  26  |  0.64    Ca    9.1      01 Jun 2021 05:00    TPro  6.5  /  Alb  3.1<L>  /  TBili  0.4  /  DBili  x   /  AST  15  /  ALT  24  /  AlkPhos  179<H>  05-31              COVID-19 PCR: NotDetec (22 May 2021 05:00)  COVID-19 PCR: NotDetec (16 May 2021 05:00)  COVID-19 PCR: NotDetec (10 May 2021 05:00)  COVID-19 PCR: NotDetec (04 May 2021 05:00)  COVID-19 PCR: NotDetec (27 Apr 2021 18:10)  COVID-19 PCR: NotDetec (26 Apr 2021 06:33)  COVID-19 PCR: NotDetec (22 Apr 2021 11:24)  COVID-19 PCR: NotDetec (19 Apr 2021 11:09)      RADIOLOGY & ADDITIONAL TESTS:  Imaging from Last 24 Hours:    Electrocardiogram/QTc Interval:    COORDINATION OF CARE:  Care Discussed with Consultants/Other Providers:

## 2021-06-02 ENCOUNTER — INPATIENT (INPATIENT)
Facility: HOSPITAL | Age: 66
LOS: 6 days | Discharge: NURSING, REHAB OR LONG-TERM CARE | DRG: 309 | End: 2021-06-09
Attending: HOSPITALIST | Admitting: INTERNAL MEDICINE
Payer: MEDICARE

## 2021-06-02 VITALS
OXYGEN SATURATION: 98 % | HEART RATE: 133 BPM | WEIGHT: 131.18 LBS | HEIGHT: 67 IN | SYSTOLIC BLOOD PRESSURE: 104 MMHG | DIASTOLIC BLOOD PRESSURE: 70 MMHG | RESPIRATION RATE: 17 BRPM | TEMPERATURE: 99 F

## 2021-06-02 VITALS — SYSTOLIC BLOOD PRESSURE: 102 MMHG | HEART RATE: 144 BPM | DIASTOLIC BLOOD PRESSURE: 64 MMHG

## 2021-06-02 DIAGNOSIS — Z90.12 ACQUIRED ABSENCE OF LEFT BREAST AND NIPPLE: Chronic | ICD-10-CM

## 2021-06-02 DIAGNOSIS — Z88.8 ALLERGY STATUS TO OTHER DRUGS, MEDICAMENTS AND BIOLOGICAL SUBSTANCES: ICD-10-CM

## 2021-06-02 DIAGNOSIS — I10 ESSENTIAL (PRIMARY) HYPERTENSION: ICD-10-CM

## 2021-06-02 DIAGNOSIS — I47.1 SUPRAVENTRICULAR TACHYCARDIA: ICD-10-CM

## 2021-06-02 DIAGNOSIS — E44.0 MODERATE PROTEIN-CALORIE MALNUTRITION: ICD-10-CM

## 2021-06-02 DIAGNOSIS — E03.9 HYPOTHYROIDISM, UNSPECIFIED: ICD-10-CM

## 2021-06-02 DIAGNOSIS — Z85.3 PERSONAL HISTORY OF MALIGNANT NEOPLASM OF BREAST: ICD-10-CM

## 2021-06-02 DIAGNOSIS — Z79.891 LONG TERM (CURRENT) USE OF OPIATE ANALGESIC: ICD-10-CM

## 2021-06-02 DIAGNOSIS — Z79.899 OTHER LONG TERM (CURRENT) DRUG THERAPY: ICD-10-CM

## 2021-06-02 DIAGNOSIS — Z90.12 ACQUIRED ABSENCE OF LEFT BREAST AND NIPPLE: ICD-10-CM

## 2021-06-02 PROCEDURE — 99223 1ST HOSP IP/OBS HIGH 75: CPT

## 2021-06-02 PROCEDURE — 99232 SBSQ HOSP IP/OBS MODERATE 35: CPT

## 2021-06-02 PROCEDURE — 93010 ELECTROCARDIOGRAM REPORT: CPT

## 2021-06-02 PROCEDURE — 71275 CT ANGIOGRAPHY CHEST: CPT | Mod: 26

## 2021-06-02 RX ORDER — POLYETHYLENE GLYCOL 3350 17 G/17G
17 POWDER, FOR SOLUTION ORAL
Qty: 0 | Refills: 0 | DISCHARGE
Start: 2021-06-02

## 2021-06-02 RX ORDER — ACETAMINOPHEN 500 MG
2 TABLET ORAL
Qty: 0 | Refills: 0 | DISCHARGE
Start: 2021-06-02

## 2021-06-02 RX ORDER — TAMSULOSIN HYDROCHLORIDE 0.4 MG/1
1 CAPSULE ORAL
Qty: 0 | Refills: 0 | DISCHARGE
Start: 2021-06-02

## 2021-06-02 RX ORDER — BETHANECHOL CHLORIDE 25 MG
1 TABLET ORAL
Qty: 0 | Refills: 0 | DISCHARGE
Start: 2021-06-02

## 2021-06-02 RX ORDER — METOPROLOL TARTRATE 50 MG
1 TABLET ORAL
Qty: 0 | Refills: 0 | DISCHARGE
Start: 2021-06-02

## 2021-06-02 RX ORDER — NYSTATIN CREAM 100000 [USP'U]/G
1 CREAM TOPICAL
Qty: 0 | Refills: 0 | DISCHARGE
Start: 2021-06-02

## 2021-06-02 RX ORDER — FOLIC ACID 0.8 MG
1 TABLET ORAL
Qty: 0 | Refills: 0 | DISCHARGE
Start: 2021-06-02

## 2021-06-02 RX ORDER — SODIUM CHLORIDE 9 MG/ML
1000 INJECTION, SOLUTION INTRAVENOUS
Refills: 0 | Status: COMPLETED | OUTPATIENT
Start: 2021-06-02 | End: 2021-06-02

## 2021-06-02 RX ORDER — ZINC OXIDE 200 MG/G
1 OINTMENT TOPICAL
Qty: 0 | Refills: 0 | DISCHARGE
Start: 2021-06-02

## 2021-06-02 RX ORDER — METOPROLOL TARTRATE 50 MG
25 TABLET ORAL ONCE
Refills: 0 | Status: COMPLETED | OUTPATIENT
Start: 2021-06-02 | End: 2021-06-02

## 2021-06-02 RX ORDER — TAMOXIFEN CITRATE 20 MG/1
1 TABLET, FILM COATED ORAL
Qty: 0 | Refills: 0 | DISCHARGE
Start: 2021-06-02

## 2021-06-02 RX ORDER — LIDOCAINE 4 G/100G
0 CREAM TOPICAL
Qty: 0 | Refills: 0 | DISCHARGE
Start: 2021-06-02

## 2021-06-02 RX ORDER — TRAZODONE HCL 50 MG
1 TABLET ORAL
Qty: 0 | Refills: 0 | DISCHARGE
Start: 2021-06-02

## 2021-06-02 RX ORDER — SENNA PLUS 8.6 MG/1
2 TABLET ORAL
Qty: 0 | Refills: 0 | DISCHARGE
Start: 2021-06-02

## 2021-06-02 RX ORDER — ESCITALOPRAM OXALATE 10 MG/1
1 TABLET, FILM COATED ORAL
Qty: 0 | Refills: 0 | DISCHARGE
Start: 2021-06-02

## 2021-06-02 RX ORDER — LEVOTHYROXINE SODIUM 125 MCG
1 TABLET ORAL
Qty: 0 | Refills: 0 | DISCHARGE
Start: 2021-06-02

## 2021-06-02 RX ADMIN — SODIUM CHLORIDE 100 MILLILITER(S): 9 INJECTION, SOLUTION INTRAVENOUS at 23:00

## 2021-06-02 RX ADMIN — Medication 25 MILLIGRAM(S): at 14:21

## 2021-06-02 RX ADMIN — LIDOCAINE 1 PATCH: 4 CREAM TOPICAL at 19:28

## 2021-06-02 RX ADMIN — OXYCODONE HYDROCHLORIDE 5 MILLIGRAM(S): 5 TABLET ORAL at 07:31

## 2021-06-02 RX ADMIN — OXYCODONE HYDROCHLORIDE 5 MILLIGRAM(S): 5 TABLET ORAL at 07:32

## 2021-06-02 RX ADMIN — ESCITALOPRAM OXALATE 10 MILLIGRAM(S): 10 TABLET, FILM COATED ORAL at 12:09

## 2021-06-02 RX ADMIN — LIDOCAINE 1 PATCH: 4 CREAM TOPICAL at 12:09

## 2021-06-02 RX ADMIN — Medication 25 MILLIGRAM(S): at 06:03

## 2021-06-02 RX ADMIN — ENOXAPARIN SODIUM 40 MILLIGRAM(S): 100 INJECTION SUBCUTANEOUS at 06:15

## 2021-06-02 RX ADMIN — ZINC OXIDE 1 APPLICATION(S): 200 OINTMENT TOPICAL at 06:02

## 2021-06-02 RX ADMIN — TAMOXIFEN CITRATE 20 MILLIGRAM(S): 20 TABLET, FILM COATED ORAL at 12:09

## 2021-06-02 RX ADMIN — NYSTATIN CREAM 1 APPLICATION(S): 100000 CREAM TOPICAL at 18:19

## 2021-06-02 RX ADMIN — Medication 25 MILLIGRAM(S): at 22:39

## 2021-06-02 RX ADMIN — ZINC OXIDE 1 APPLICATION(S): 200 OINTMENT TOPICAL at 18:20

## 2021-06-02 RX ADMIN — Medication 1 TABLET(S): at 12:09

## 2021-06-02 RX ADMIN — LIDOCAINE 1 PATCH: 4 CREAM TOPICAL at 00:30

## 2021-06-02 RX ADMIN — Medication 1 MILLIGRAM(S): at 12:09

## 2021-06-02 RX ADMIN — Medication 112 MICROGRAM(S): at 06:03

## 2021-06-02 RX ADMIN — POLYETHYLENE GLYCOL 3350 17 GRAM(S): 17 POWDER, FOR SOLUTION ORAL at 12:09

## 2021-06-02 RX ADMIN — NYSTATIN CREAM 1 APPLICATION(S): 100000 CREAM TOPICAL at 06:02

## 2021-06-02 NOTE — PROGRESS NOTE ADULT - SUBJECTIVE AND OBJECTIVE BOX
(x  ) No complaints (  ) Complains of:     T(F): 98.5 (06-02-21 @ 19:35), Max: 98.5 (06-02-21 @ 19:35)  HR: 88 (06-02-21 @ 19:35) (69 - 88)  BP: 117/61 (06-02-21 @ 19:35) (104/67 - 121/77)  RR: 15 (06-02-21 @ 19:35) (14 - 15)  SpO2: 92% (06-02-21 @ 19:35) (92% - 95%)        Wound Location 1:  left eyebrow healing well       Wound location 2; mid forehead steri strips changed.  wound closed. no suggestion of forehead hematoma                Procedure Performed:  (  )Yes     (   )No  Name of Procedure:  (  )debridement    (  )I&D    (  )Other:  (  )partial thickness     (  )full thickness     (  )subcutaneous     (  )muscle/tendon     (  )bone  (  )sharp     (  )surgical

## 2021-06-02 NOTE — PROGRESS NOTE ADULT - NUTRITIONAL ASSESSMENT
This patient has been assessed with a concern for Malnutrition and has been determined to have a diagnosis/diagnoses of Moderate protein-calorie malnutrition.    This patient is being managed with:   Diet Regular-  Kosher  Supplement Feeding Modality:  Oral  Ensure Enlive Cans or Servings Per Day:  1       Frequency:  Three Times a day  Entered: May 23 2021  3:23PM    
This patient has been assessed with a concern for Malnutrition and has been determined to have a diagnosis/diagnoses of Moderate protein-calorie malnutrition.    This patient is being managed with:   Diet Regular-  Kosher  Supplement Feeding Modality:  Oral  Ensure Enlive Cans or Servings Per Day:  1       Frequency:  Three Times a day  Entered: May 23 2021  3:23PM    Diet Regular-  Kosher  Supplement Feeding Modality:  Oral  Ensure Enlive Cans or Servings Per Day:  1       Frequency:  Two Times a day  Entered: Apr 28 2021  2:04PM    The following pending diet order is being considered for treatment of Moderate protein-calorie malnutrition:null
This patient has been assessed with a concern for Malnutrition and has been determined to have a diagnosis/diagnoses of Moderate protein-calorie malnutrition.    This patient is being managed with:   Diet Regular-  Kosher  Supplement Feeding Modality:  Oral  Ensure Enlive Cans or Servings Per Day:  1       Frequency:  Three Times a day  Entered: May 23 2021  3:23PM    Diet Regular-  Kosher  Supplement Feeding Modality:  Oral  Ensure Enlive Cans or Servings Per Day:  1       Frequency:  Two Times a day  Entered: Apr 28 2021  2:04PM    The following pending diet order is being considered for treatment of Moderate protein-calorie malnutrition:null
This patient has been assessed with a concern for Malnutrition and has been determined to have a diagnosis/diagnoses of Moderate protein-calorie malnutrition.    This patient is being managed with:   Diet Regular-  Kosher  Supplement Feeding Modality:  Oral  Ensure Enlive Cans or Servings Per Day:  1       Frequency:  Three Times a day  Entered: May 23 2021  3:23PM    

## 2021-06-02 NOTE — PROGRESS NOTE ADULT - ASSESSMENT
JONATHAN CHILD is a 65y with PMHx breast Ca S/P mastectomy, HTN, hypothyroidism who presented to [facility] 4/19/21 s/p fall down a flight of stairs with bilateral frontoparietal SAH; L nasal bone and L orbital rim fractures; sternal fracture; L 3-8, R 7th rib fractures;  L pneumothorax, L iliac wing fracture with hematoma S/P 1U pRBCs; left clavicle fx, left V finger PIP dislocation s/p CR. She is WBAT Left LE, NWB Left UE.     # tSAH s/p fall  - Avoid NSAIDs  - Modafanil dc'd 5/29 due to persistent insomnia  - Cont comprehensive rehab program, PT/OT/SLP 3 hours a day, 5 days a week.   - F/u neurosurgery, Dr. Vikas Aquino, upon discharge  - Precautions: falls, sternal , WB as above, AMS, cardiac, h/o breast CA    # S/P Fall 5/17/21 with head strike and lacerations left lateral brow and forehead  - CT head, CT cervical, CT C/A/P no acute findings 5/17.  - Spoke with Dr. Wheatley ([facility]) via transfer center, imaging reviewed and no indication for acute transfer.   - Plastics consulted and appreciated 5/17, and forehead laceration x 2 sutured  ·	steri strips are to be kept clean and dry and not to be removed before 6/2/21. Plastics to f/u this weekend  ·	from 5/34 -6/2/21, call plastic surgery on call for any emergency  - Repeat Head CT 12 hours post on 5/18 unremarkable  - continue 2:1 supervision, assessed 6/2    # Multiple fractures. WBAT LLE, NWB LUE   - OK ROM exercises for L elbow, wrist, hand.  L shoulder pendulum exercises. Sling to LUE for comfort. No lifting more than 1-3 pounds for 6 weeks. Orthopedic feedback re: possible upgrade WB/ROM pending (6/1)  - LSO brace whenever OOB. (replaced 5/14)  - buddy tape dc'd for L 5th finger dislocation (per Dr. Choe 5/10)  - Continue Lovenox for 6 weeks total (through tomorrow 6/3)  - follow up on dc:  ·	plastic surgeon Dr. Nell Choe, for facial laceration, L pinky dislocation, L nasal bone and orbital rim fractures.   ·	ortho, Dr. Reji Cardoza, for L clavicle and L pelvic wing fractures  ·	surgery, Dr. Dino Peña, for rib fractures    # Acute Hypoxic Respiratory Failure likely due to Acute B/L rib fractures, and small pneumothorax  - Incentive spirometry. Breathing exercises  - stable on RA    # Elevated Alk Phos likely 2/2 bone fractures  - Hepatic sono 5/7: Multiple gallstones, without evidence for gallbladder wall thickening or pericholecystic fluid.  -   AST  15  /  ALT  24  /  AlkPhos  179<H>  05-31 5/31 improving  - CMP 6/3    # H/o SVT  - Continue with toprol XL 25mg , hold for HR <55  - HR 69-89, 104/67 - 121/77 BP 6/2  - Can follow up in 3 months with EP Dr. Hernández [phone]. (elective ablation)    #R kidney lesion  -  consult appreciated. f/u surveillance sono /possible MRI as outpatient  - F/u Dr. Herrera outpatient or PCP    #Hypothyroidism  - Continue Synthroid 112mcg daily    # h/o breast CA  - was previously on tamoxifen, stopped when admitted following fall  - resume 20 mg daily    # Depression: stable/improved  - Psychiatry f/u 5/11 read and appreciated:   ·	Continue Lexapro 10mg  ·	Modafinil 50 mg dc 5/29 due to insomnia  ·	Trazodone 50 mg qhs    # insomnia  - currently on trazodone 50 qhs  - off modafanil  - TOV, toileting to reduce retention overnight     # Pain  - Tylenol  - Lidoderm to L chest wall  - dx oxycodone 6/2    # GI/Bowel:  - Senna QHS, Miralax Daily    # /Bladder:   - Urine Cx: E. coli: started on Nitrofurantoin, switched to CTX for 3 day course, finished 5/18  - UA 5/17 negative  -  following  ·	continue bethanechol-25 TID  ·	Continue Flomax 0.4 mg daily  ·	Continue TOV (started 6/1) Bladder scan q 6 hours, SC for PVR >350 ml, toileting  ·	dc oxycodone 6/2    # Diet  - regular solids thin liquids  - 1:1 supervision for meals    # DVT ppx:  - Lovenox  - SCDs    # Case discussed in IDT 6/1:  - tolerating regular diet. Min assist bADLs, min assist transfers with decreased balance, supervision/set up feeds  - Goal: supervision grooming, Mark shower transfers, min assist dressing, performs 8 step with 1 HR and min assist  - Patient for JULI placement      #LABS  2:1 observation  bladder scan  orthopedic f/u re: WB  CBC BMP 6/3      ---------------  Outpatient Follow-up (Specialty/Name of physician):  Silvina Herrera  INTERNAL MEDICINE  [address] [place] [address]  Phone: [phone]  Fax: [phone]  Follow Up Time: 1 week    Vikas Aquino (MD)  Neurosurgery  [address] [place] [address], [address] [place] [address]  Phone: [phone]  Fax: [phone]  Follow Up Time: 2 weeks    Nell Choe)  Plastic Surgery  [address] [facility], [address]  [place], [place] [id number]  Phone: [phone]  Fax: [phone]  Follow Up Time: 1 week    Reji Cardoza)  Orthopaedic Surgery  [address] [facility], [facility] [address]  [place] [address]  Phone: [phone]  Fax: [phone]  Follow Up Time: Routine    Dino Peña)  Surgery; Surgical Critical Care  [address] [facility], [address] [address]  Phone: [phone]  Fax: [phone]  Follow Up Time: Routine    Morgan Hernández; PhD)  Cardiac Electrophysiology; Cardiovascular Disease; Internal Medicine  [facility] - Dept of Cardiology, [address] [place] [address]  Phone: [phone]  Fax: [phone]

## 2021-06-02 NOTE — PROGRESS NOTE ADULT - NSICDXPILOT_GEN_ALL_CORE
[place]

## 2021-06-02 NOTE — PROGRESS NOTE ADULT - SUBJECTIVE AND OBJECTIVE BOX
Patient is a 66y old  Female who presents with a chief complaint of SAH and fractures of left-sided ribs, left iliac wing with hematoma, S/P closed reduction of left pinky PIP dislocation after trauma (2021 11:33)      HPI:  65F PMHx breast cancer s/p mastectomy, HTN, hypothyroidism, not on full anticoagulation/antiplatelets per EMS, presented to [facility] 21as a level 2 trauma activation after a fall from a flight of stairs. Patient only able to say "ow" in trauma bay, unable to provide further history. However per son, normal mentation at baseline. In the trauma bay, airway was intact with bilateral breath sounds, O2 saturation 100% on room air. Initially blood pressure was 140s systolic however on repeat 90s. NS@100 initiated. Secondary significant for left forehead laceration, left chest wall tenderness, left pelvic tenderness.     Ms. Arthur was hypotensive in the trauma bay and was transferred to the Surgical ICU after imaging was obtained. Her injury list is below.  (1) SAH, bilateral: stable on repeat imaging. Seven day course of Keppra for post-traumatic seizure prophylaxis.  (2) left 3-8 and right 7th rib fractures with occult left pneumothorax: pain control  (3) left iliac wing fracture with hematoma: required transfusion early in her ICU course, after which CBC were stable  (4) left forehead laceration s/p suture repair  (5) s/p successful closed reduction of left pinky PIP dislocation with delfin splint  (6) left clavicle fracture  (7) left nasal bone and left orbital rim fractures  (8) sternal fracture  Incidental findings: right renal lesion. Pt's  was notified and copy of the report given to him.     The patient was admitted to Trauma Surgery under SICU care.  Ortho was consulted for L. iliac wing fx, sternal fx, L. clavicle fx- recommended WBAT of the affected left lower extremity with walker, WBAT L shoulder, pendulum exercises okay, no lifting, sling for comfort, Encourage ROM of elbow/wrist/hand, PT/OT consult, NO urgent orthopedic surgical intervention at this time.    Neurosurgery consulted- recommended repeat CTH, which was stable.     PRS was consulted for non displaced left superior orbital rim and non displaced left nasal bone fracture--> No indication for operative fixation of these non displaced fractures; recommend ice to face to aid in swelling and analgesia.    She was placed on hemorrhage watch in the SICU given pelvic fracture and surrounding hematoma.  Behavior health was consulted for delirium.    Hand was consulted for L. pinky PIP dislocation- s/p closed reduction, rec Cont delfin taping for now.    - CTH stable  - Added home lexapro  - Added NS @ 50cc/hr to supplement poor PO intake  - Lactate cleared (1.9)    - Transfused 1u pRBC, Hct 20.9 -> 26.9  - Went into SVT, given adenosine x1 and started on metoprolol 25mg q12hr  - Added oxycodone 5mg q4hrs prn for increased pain control  - PO intake improved, IVL    - EP was consulted for SVT, recc continue lopressor 25mg   Metoprolol PO increased from 25mg q12hrs to 25mg q6hrs.   - Added salt tabs 1g q8hrs and started on NS @ 50cc/hr for hyponatremia  - Avila discontinued, passed TOV  - Added lovenox  - Cervical collar cleared by confrontational exam     - Patient transferred from SICU to surgical floor in stable condition.  Discharge planning to acute rehab.     Stable from neurologic perspective consistent with mild TBI  Multiple rib fractures - breathing comfortable with multimodal pain control    On day of discharge, the patient was tolerating diet, working with PT well and pain controlled. The patient was medically stable for discharge to acute rehab with instructions for outpatient follow up 21. 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    C Section    Abnormality, eye  h/o left eye vitrectomy    S/P D&amp;C (status post dilation and curettage)    H/O left mastectomy        MEDICATIONS  (STANDING):  bethanechol 25 milliGRAM(s) Oral every 8 hours  enoxaparin Injectable 40 milliGRAM(s) SubCutaneous every 24 hours  escitalopram 10 milliGRAM(s) Oral daily  folic acid 1 milliGRAM(s) Oral daily  levothyroxine 112 MICROGram(s) Oral daily  lidocaine   Patch 1 Patch Transdermal daily  metoprolol succinate ER 25 milliGRAM(s) Oral daily  multivitamin 1 Tablet(s) Oral daily  nystatin Powder 1 Application(s) Topical two times a day  oxyCODONE    IR 5 milliGRAM(s) Oral <User Schedule>  polyethylene glycol 3350 17 Gram(s) Oral daily  senna 2 Tablet(s) Oral at bedtime  tamoxifen 20 milliGRAM(s) Oral daily  tamsulosin 0.4 milliGRAM(s) Oral at bedtime  traZODone 50 milliGRAM(s) Oral at bedtime  zinc oxide 40% Ointment 1 Application(s) Topical two times a day    MEDICATIONS  (PRN):  acetaminophen   Tablet .. 650 milliGRAM(s) Oral every 6 hours PRN Temp greater or equal to 38C (100.4F), Mild Pain (1 - 3)      Allergies    Tapazole (Hives)    Intolerances          VITALS  66y  Vital Signs Last 24 Hrs  T(C): 36.8 (2021 07:35), Max: 36.9 (2021 21:17)  T(F): 98.2 (2021 07:35), Max: 98.5 (2021 21:17)  HR: 79 (2021 07:35) (69 - 89)  BP: 121/77 (2021 07:35) (104/67 - 121/77)  BP(mean): --  RR: 14 (2021 07:35) (14 - 14)  SpO2: 95% (2021 07:35) (94% - 96%)  Daily     Daily Weight in k.5 (2021 01:00)        RECENT LABS:      -    145  |  110<H>  |  22  ----------------------------<  100<H>  4.9   |  26  |  0.64    Ca    9.1      2021 05:00                CAPILLARY BLOOD GLUCOSE            Review of Systems:   · Additional ROS	Patient reports voiding and having improved sleep overnight, however nursing reports that patient has NOT voided, required SC every 6 hours with bladder scan 521 ml and return 540 ml this morning. Staff reports there is no incontinence, no voiding since avila removed yesterday    Physical Exam:   · Constitutional	detailed exam  · Constitutional Details	no distress  · Constitutional Comments	 alert, NAD. Laceration forehead healing, no swelling, ecchymoses resolved, no TTP    reduced recall adn awareness, not reliable historian. Does not appear fatigued today, reduced initiation    	  	  · Respiratory	detailed exam  · Respiratory Details	breath sounds equal; respirations non-labored  LSO in place  · Cardiovascular	detailed exam  · Cardiovascular Details	regular rate and rhythm  · Gastrointestinal	detailed exam  · GI Normal	nontender; no distention; bowel sounds normal  	no suprapubic fullness or TTP  · Extremities	calves soft, NT no erythema

## 2021-06-03 LAB
ALBUMIN SERPL ELPH-MCNC: 3 G/DL — LOW (ref 3.3–5)
ALP SERPL-CCNC: 163 U/L — HIGH (ref 40–120)
ALT FLD-CCNC: 26 U/L — SIGNIFICANT CHANGE UP (ref 10–45)
ANION GAP SERPL CALC-SCNC: 10 MMOL/L — SIGNIFICANT CHANGE UP (ref 5–17)
ANION GAP SERPL CALC-SCNC: 13 MMOL/L — SIGNIFICANT CHANGE UP (ref 5–17)
APPEARANCE UR: CLEAR — SIGNIFICANT CHANGE UP
AST SERPL-CCNC: 15 U/L — SIGNIFICANT CHANGE UP (ref 10–40)
BACTERIA # UR AUTO: ABNORMAL /HPF
BASOPHILS # BLD AUTO: 0.02 K/UL — SIGNIFICANT CHANGE UP (ref 0–0.2)
BASOPHILS # BLD AUTO: 0.03 K/UL — SIGNIFICANT CHANGE UP (ref 0–0.2)
BASOPHILS NFR BLD AUTO: 0.2 % — SIGNIFICANT CHANGE UP (ref 0–2)
BASOPHILS NFR BLD AUTO: 0.3 % — SIGNIFICANT CHANGE UP (ref 0–2)
BILIRUB SERPL-MCNC: 0.5 MG/DL — SIGNIFICANT CHANGE UP (ref 0.2–1.2)
BILIRUB UR-MCNC: NEGATIVE — SIGNIFICANT CHANGE UP
BUN SERPL-MCNC: 19 MG/DL — SIGNIFICANT CHANGE UP (ref 7–23)
BUN SERPL-MCNC: 24 MG/DL — HIGH (ref 7–23)
CALCIUM SERPL-MCNC: 8.7 MG/DL — SIGNIFICANT CHANGE UP (ref 8.4–10.5)
CALCIUM SERPL-MCNC: 8.9 MG/DL — SIGNIFICANT CHANGE UP (ref 8.4–10.5)
CHLORIDE SERPL-SCNC: 106 MMOL/L — SIGNIFICANT CHANGE UP (ref 96–108)
CHLORIDE SERPL-SCNC: 107 MMOL/L — SIGNIFICANT CHANGE UP (ref 96–108)
CO2 SERPL-SCNC: 23 MMOL/L — SIGNIFICANT CHANGE UP (ref 22–31)
CO2 SERPL-SCNC: 24 MMOL/L — SIGNIFICANT CHANGE UP (ref 22–31)
COLOR SPEC: YELLOW — SIGNIFICANT CHANGE UP
CREAT SERPL-MCNC: 0.6 MG/DL — SIGNIFICANT CHANGE UP (ref 0.5–1.3)
CREAT SERPL-MCNC: 0.74 MG/DL — SIGNIFICANT CHANGE UP (ref 0.5–1.3)
DIFF PNL FLD: ABNORMAL
EOSINOPHIL # BLD AUTO: 0.05 K/UL — SIGNIFICANT CHANGE UP (ref 0–0.5)
EOSINOPHIL # BLD AUTO: 0.08 K/UL — SIGNIFICANT CHANGE UP (ref 0–0.5)
EOSINOPHIL NFR BLD AUTO: 0.5 % — SIGNIFICANT CHANGE UP (ref 0–6)
EOSINOPHIL NFR BLD AUTO: 0.7 % — SIGNIFICANT CHANGE UP (ref 0–6)
EPI CELLS # UR: SIGNIFICANT CHANGE UP
GLUCOSE SERPL-MCNC: 111 MG/DL — HIGH (ref 70–99)
GLUCOSE SERPL-MCNC: 114 MG/DL — HIGH (ref 70–99)
GLUCOSE UR QL: NEGATIVE — SIGNIFICANT CHANGE UP
HCT VFR BLD CALC: 38.5 % — SIGNIFICANT CHANGE UP (ref 34.5–45)
HCT VFR BLD CALC: 39.9 % — SIGNIFICANT CHANGE UP (ref 34.5–45)
HGB BLD-MCNC: 12.4 G/DL — SIGNIFICANT CHANGE UP (ref 11.5–15.5)
HGB BLD-MCNC: 13.3 G/DL — SIGNIFICANT CHANGE UP (ref 11.5–15.5)
IMM GRANULOCYTES NFR BLD AUTO: 0.3 % — SIGNIFICANT CHANGE UP (ref 0–1.5)
IMM GRANULOCYTES NFR BLD AUTO: 0.5 % — SIGNIFICANT CHANGE UP (ref 0–1.5)
KETONES UR-MCNC: NEGATIVE — SIGNIFICANT CHANGE UP
LEUKOCYTE ESTERASE UR-ACNC: ABNORMAL
LYMPHOCYTES # BLD AUTO: 1.42 K/UL — SIGNIFICANT CHANGE UP (ref 1–3.3)
LYMPHOCYTES # BLD AUTO: 1.65 K/UL — SIGNIFICANT CHANGE UP (ref 1–3.3)
LYMPHOCYTES # BLD AUTO: 13.6 % — SIGNIFICANT CHANGE UP (ref 13–44)
LYMPHOCYTES # BLD AUTO: 13.8 % — SIGNIFICANT CHANGE UP (ref 13–44)
MAGNESIUM SERPL-MCNC: 1.9 MG/DL — SIGNIFICANT CHANGE UP (ref 1.6–2.6)
MAGNESIUM SERPL-MCNC: 2 MG/DL — SIGNIFICANT CHANGE UP (ref 1.6–2.6)
MCHC RBC-ENTMCNC: 29.5 PG — SIGNIFICANT CHANGE UP (ref 27–34)
MCHC RBC-ENTMCNC: 29.7 PG — SIGNIFICANT CHANGE UP (ref 27–34)
MCHC RBC-ENTMCNC: 32.2 GM/DL — SIGNIFICANT CHANGE UP (ref 32–36)
MCHC RBC-ENTMCNC: 33.3 GM/DL — SIGNIFICANT CHANGE UP (ref 32–36)
MCV RBC AUTO: 88.5 FL — SIGNIFICANT CHANGE UP (ref 80–100)
MCV RBC AUTO: 92.3 FL — SIGNIFICANT CHANGE UP (ref 80–100)
MONOCYTES # BLD AUTO: 0.71 K/UL — SIGNIFICANT CHANGE UP (ref 0–0.9)
MONOCYTES # BLD AUTO: 0.86 K/UL — SIGNIFICANT CHANGE UP (ref 0–0.9)
MONOCYTES NFR BLD AUTO: 6.8 % — SIGNIFICANT CHANGE UP (ref 2–14)
MONOCYTES NFR BLD AUTO: 7.2 % — SIGNIFICANT CHANGE UP (ref 2–14)
NEUTROPHILS # BLD AUTO: 8.18 K/UL — HIGH (ref 1.8–7.4)
NEUTROPHILS # BLD AUTO: 9.29 K/UL — HIGH (ref 1.8–7.4)
NEUTROPHILS NFR BLD AUTO: 77.5 % — HIGH (ref 43–77)
NEUTROPHILS NFR BLD AUTO: 78.6 % — HIGH (ref 43–77)
NITRITE UR-MCNC: NEGATIVE — SIGNIFICANT CHANGE UP
NRBC # BLD: 0 /100 WBCS — SIGNIFICANT CHANGE UP (ref 0–0)
NRBC # BLD: 0 /100 WBCS — SIGNIFICANT CHANGE UP (ref 0–0)
PH UR: 8 — SIGNIFICANT CHANGE UP (ref 5–8)
PLATELET # BLD AUTO: 201 K/UL — SIGNIFICANT CHANGE UP (ref 150–400)
PLATELET # BLD AUTO: 215 K/UL — SIGNIFICANT CHANGE UP (ref 150–400)
POTASSIUM SERPL-MCNC: 4.1 MMOL/L — SIGNIFICANT CHANGE UP (ref 3.5–5.3)
POTASSIUM SERPL-MCNC: 4.1 MMOL/L — SIGNIFICANT CHANGE UP (ref 3.5–5.3)
POTASSIUM SERPL-SCNC: 4.1 MMOL/L — SIGNIFICANT CHANGE UP (ref 3.5–5.3)
POTASSIUM SERPL-SCNC: 4.1 MMOL/L — SIGNIFICANT CHANGE UP (ref 3.5–5.3)
PROT SERPL-MCNC: 6.1 G/DL — SIGNIFICANT CHANGE UP (ref 6–8.3)
PROT UR-MCNC: 30 MG/DL
RBC # BLD: 4.17 M/UL — SIGNIFICANT CHANGE UP (ref 3.8–5.2)
RBC # BLD: 4.51 M/UL — SIGNIFICANT CHANGE UP (ref 3.8–5.2)
RBC # FLD: 13.1 % — SIGNIFICANT CHANGE UP (ref 10.3–14.5)
RBC # FLD: 13.2 % — SIGNIFICANT CHANGE UP (ref 10.3–14.5)
RBC CASTS # UR COMP ASSIST: ABNORMAL /HPF (ref 0–4)
SODIUM SERPL-SCNC: 141 MMOL/L — SIGNIFICANT CHANGE UP (ref 135–145)
SODIUM SERPL-SCNC: 142 MMOL/L — SIGNIFICANT CHANGE UP (ref 135–145)
SP GR SPEC: 1.01 — SIGNIFICANT CHANGE UP (ref 1.01–1.02)
TROPONIN I SERPL-MCNC: <.017 NG/ML — LOW (ref 0.02–0.06)
TROPONIN I SERPL-MCNC: <.017 NG/ML — LOW (ref 0.02–0.06)
TSH SERPL-MCNC: 0.41 UIU/ML — SIGNIFICANT CHANGE UP (ref 0.27–4.2)
UROBILINOGEN FLD QL: NEGATIVE — SIGNIFICANT CHANGE UP
WBC # BLD: 10.41 K/UL — SIGNIFICANT CHANGE UP (ref 3.8–10.5)
WBC # BLD: 11.97 K/UL — HIGH (ref 3.8–10.5)
WBC # FLD AUTO: 10.41 K/UL — SIGNIFICANT CHANGE UP (ref 3.8–10.5)
WBC # FLD AUTO: 11.97 K/UL — HIGH (ref 3.8–10.5)
WBC UR QL: ABNORMAL /HPF (ref 0–5)

## 2021-06-03 PROCEDURE — 99222 1ST HOSP IP/OBS MODERATE 55: CPT

## 2021-06-03 RX ORDER — TRAZODONE HCL 50 MG
50 TABLET ORAL AT BEDTIME
Refills: 0 | Status: DISCONTINUED | OUTPATIENT
Start: 2021-06-03 | End: 2021-06-09

## 2021-06-03 RX ORDER — DILTIAZEM HCL 120 MG
10 CAPSULE, EXT RELEASE 24 HR ORAL ONCE
Refills: 0 | Status: COMPLETED | OUTPATIENT
Start: 2021-06-03 | End: 2021-06-03

## 2021-06-03 RX ORDER — METOPROLOL TARTRATE 50 MG
25 TABLET ORAL EVERY 12 HOURS
Refills: 0 | Status: DISCONTINUED | OUTPATIENT
Start: 2021-06-03 | End: 2021-06-04

## 2021-06-03 RX ORDER — SODIUM CHLORIDE 9 MG/ML
1000 INJECTION, SOLUTION INTRAVENOUS
Refills: 0 | Status: COMPLETED | OUTPATIENT
Start: 2021-06-03 | End: 2021-06-03

## 2021-06-03 RX ORDER — TAMOXIFEN CITRATE 20 MG/1
20 TABLET, FILM COATED ORAL DAILY
Refills: 0 | Status: DISCONTINUED | OUTPATIENT
Start: 2021-06-03 | End: 2021-06-09

## 2021-06-03 RX ORDER — BETHANECHOL CHLORIDE 25 MG
25 TABLET ORAL EVERY 8 HOURS
Refills: 0 | Status: DISCONTINUED | OUTPATIENT
Start: 2021-06-03 | End: 2021-06-09

## 2021-06-03 RX ORDER — METOPROLOL TARTRATE 50 MG
2.5 TABLET ORAL ONCE
Refills: 0 | Status: COMPLETED | OUTPATIENT
Start: 2021-06-03 | End: 2021-06-03

## 2021-06-03 RX ORDER — LEVOTHYROXINE SODIUM 125 MCG
112 TABLET ORAL DAILY
Refills: 0 | Status: DISCONTINUED | OUTPATIENT
Start: 2021-06-03 | End: 2021-06-09

## 2021-06-03 RX ORDER — TAMSULOSIN HYDROCHLORIDE 0.4 MG/1
0.4 CAPSULE ORAL AT BEDTIME
Refills: 0 | Status: DISCONTINUED | OUTPATIENT
Start: 2021-06-03 | End: 2021-06-05

## 2021-06-03 RX ORDER — SODIUM CHLORIDE 9 MG/ML
500 INJECTION, SOLUTION INTRAVENOUS ONCE
Refills: 0 | Status: COMPLETED | OUTPATIENT
Start: 2021-06-03 | End: 2021-06-03

## 2021-06-03 RX ORDER — ACETAMINOPHEN 500 MG
650 TABLET ORAL EVERY 6 HOURS
Refills: 0 | Status: DISCONTINUED | OUTPATIENT
Start: 2021-06-03 | End: 2021-06-09

## 2021-06-03 RX ORDER — OXYCODONE HYDROCHLORIDE 5 MG/1
5 TABLET ORAL EVERY 6 HOURS
Refills: 0 | Status: DISCONTINUED | OUTPATIENT
Start: 2021-06-03 | End: 2021-06-09

## 2021-06-03 RX ORDER — LIDOCAINE 4 G/100G
1 CREAM TOPICAL DAILY
Refills: 0 | Status: DISCONTINUED | OUTPATIENT
Start: 2021-06-03 | End: 2021-06-09

## 2021-06-03 RX ORDER — ZINC OXIDE 200 MG/G
1 OINTMENT TOPICAL
Refills: 0 | Status: DISCONTINUED | OUTPATIENT
Start: 2021-06-03 | End: 2021-06-09

## 2021-06-03 RX ORDER — ESCITALOPRAM OXALATE 10 MG/1
10 TABLET, FILM COATED ORAL DAILY
Refills: 0 | Status: DISCONTINUED | OUTPATIENT
Start: 2021-06-03 | End: 2021-06-09

## 2021-06-03 RX ORDER — SENNA PLUS 8.6 MG/1
2 TABLET ORAL AT BEDTIME
Refills: 0 | Status: DISCONTINUED | OUTPATIENT
Start: 2021-06-03 | End: 2021-06-09

## 2021-06-03 RX ORDER — ENOXAPARIN SODIUM 100 MG/ML
40 INJECTION SUBCUTANEOUS DAILY
Refills: 0 | Status: DISCONTINUED | OUTPATIENT
Start: 2021-06-03 | End: 2021-06-09

## 2021-06-03 RX ORDER — FOLIC ACID 0.8 MG
1 TABLET ORAL DAILY
Refills: 0 | Status: DISCONTINUED | OUTPATIENT
Start: 2021-06-03 | End: 2021-06-09

## 2021-06-03 RX ORDER — POLYETHYLENE GLYCOL 3350 17 G/17G
17 POWDER, FOR SOLUTION ORAL DAILY
Refills: 0 | Status: DISCONTINUED | OUTPATIENT
Start: 2021-06-03 | End: 2021-06-09

## 2021-06-03 RX ORDER — NYSTATIN CREAM 100000 [USP'U]/G
1 CREAM TOPICAL
Refills: 0 | Status: DISCONTINUED | OUTPATIENT
Start: 2021-06-03 | End: 2021-06-09

## 2021-06-03 RX ADMIN — Medication 2.5 MILLIGRAM(S): at 00:28

## 2021-06-03 RX ADMIN — TAMOXIFEN CITRATE 20 MILLIGRAM(S): 20 TABLET, FILM COATED ORAL at 12:01

## 2021-06-03 RX ADMIN — LIDOCAINE 1 PATCH: 4 CREAM TOPICAL at 12:02

## 2021-06-03 RX ADMIN — NYSTATIN CREAM 1 APPLICATION(S): 100000 CREAM TOPICAL at 05:41

## 2021-06-03 RX ADMIN — SODIUM CHLORIDE 500 MILLILITER(S): 9 INJECTION, SOLUTION INTRAVENOUS at 01:10

## 2021-06-03 RX ADMIN — ZINC OXIDE 1 APPLICATION(S): 200 OINTMENT TOPICAL at 05:43

## 2021-06-03 RX ADMIN — TAMSULOSIN HYDROCHLORIDE 0.4 MILLIGRAM(S): 0.4 CAPSULE ORAL at 21:33

## 2021-06-03 RX ADMIN — ESCITALOPRAM OXALATE 10 MILLIGRAM(S): 10 TABLET, FILM COATED ORAL at 12:01

## 2021-06-03 RX ADMIN — Medication 25 MILLIGRAM(S): at 18:41

## 2021-06-03 RX ADMIN — Medication 25 MILLIGRAM(S): at 05:43

## 2021-06-03 RX ADMIN — Medication 1 MILLIGRAM(S): at 12:01

## 2021-06-03 RX ADMIN — Medication 2.5 MILLIGRAM(S): at 01:09

## 2021-06-03 RX ADMIN — NYSTATIN CREAM 1 APPLICATION(S): 100000 CREAM TOPICAL at 18:41

## 2021-06-03 RX ADMIN — Medication 25 MILLIGRAM(S): at 14:48

## 2021-06-03 RX ADMIN — LIDOCAINE 1 PATCH: 4 CREAM TOPICAL at 19:42

## 2021-06-03 RX ADMIN — ENOXAPARIN SODIUM 40 MILLIGRAM(S): 100 INJECTION SUBCUTANEOUS at 12:03

## 2021-06-03 RX ADMIN — Medication 50 MILLIGRAM(S): at 21:33

## 2021-06-03 RX ADMIN — Medication 112 MICROGRAM(S): at 05:43

## 2021-06-03 RX ADMIN — SODIUM CHLORIDE 100 MILLILITER(S): 9 INJECTION, SOLUTION INTRAVENOUS at 00:29

## 2021-06-03 RX ADMIN — Medication 1 TABLET(S): at 18:41

## 2021-06-03 RX ADMIN — Medication 1 TABLET(S): at 05:43

## 2021-06-03 RX ADMIN — Medication 25 MILLIGRAM(S): at 21:33

## 2021-06-03 RX ADMIN — Medication 1 TABLET(S): at 12:01

## 2021-06-03 RX ADMIN — ZINC OXIDE 1 APPLICATION(S): 200 OINTMENT TOPICAL at 18:42

## 2021-06-03 NOTE — H&P ADULT - NS NEC GEN PE MLT EXAM PC
metocloperamide- 10/3/16 120 tabs 11 refill  Omeprazole 4/7/14 180 tabs 4 refills    Last visit 10/10/17  Next visit 4/6/18    Refill? detailed exam

## 2021-06-03 NOTE — PROVIDER CONTACT NOTE (OTHER) - SITUATION
MD called for HR still sustaining at 130s. total 5mg metoprolol IVP given. inderal po given, LR Bolus done. RN was about to give cardizem IVP when patient's HR went down to 70's. MD aware.
received from [facility] for SVT, HR in 130s.

## 2021-06-03 NOTE — PROVIDER CONTACT NOTE (MEDICATION) - BACKGROUND
Patient stable at beginning of shift. Became tachycardic around 22:18, , BP 87/61. Retook Vitals. /70 . Manual /64, . EKG ordered. EKG showed SVT. Patient given Lopressor oral 25 mg STAT dose and 1000mL Lactated Ringers 100ml/hr. Patient to be sent for STAT CT and Discharged to Telemetry.
s/p mastectomy, hypothyroid, Bilateral SAH, Pneumothorax, sternal fx, l forehead laceration breast CA

## 2021-06-03 NOTE — CONSULT NOTE ADULT - SUBJECTIVE AND OBJECTIVE BOX
Chief Complaint:     Mrs Arthur is a 66y old  Female with HTN, hypothyroidism, hx of breast Ca, hx of mastectomy, transferred from acute rehab [address] [place] [address] due to SVT - rate of 150, asso with palpitations, lightheaded ness and mild dyspnea.  Pt was recently hospitalized at [facility] 4/19/21 for s/p fall- she acquired a SAH, left rib fractures 3-8, left iliac wing fracture with hematoma, received 1 uPRBC for acute blood loss anemia 4/22/21, left clavicle fracture, left nasal bone and left orbital rim fractures, sternal fracture, left pinky PIP dislocation- s/p closed reduction.  Pt had repeat head CT 4/21, was stable.  During that hospitalization, pt had episode of SVT and Metoprolol was resumed. Pt was stabilized, was d/celine, and admitted here to [facility] for acute rehab on 04/24/21.Pt has been okay until this evening around 10:20 PM, pt started to c/o palpitations, lightheadedness, and mild dyspnea.  Pt apparently did not receive Metoprolol dose yesterday and this AM - was held based on parameters.Pt seen and examined, alert in no acute distress. EKG done showed SVT rate of 150/min.  BP was 106/60.  O2 sat 96% on room air.  Pt denies chest pain, cough, fever, chills, leg pain.  CT Angio Chest done and did not show any pulm embolism.  Lopressor 25 mg po x 1 given and pt transferred to Telemetry for further eval, monitoring and management. Pt had an Echo 4/23/21 w/c reported normal LV fn, normal mitral valve. Minimal mitral regurgitation. Normal trileaflet aortic valve. No aortic valve regurgitation seen. No segmental wall motion abnormalities. Normal right ventricular size and function. cardiolgy eval requested    HPI:    PMH:   Hypothyroidism    Hyperthyroidism    Breast cancer      PSH:   Salivary Gland Disease    C Section    Abnormality, eye    S/P D&amp;C (status post dilation and curettage)    H/O left mastectomy      Family History:  FAMILY HISTORY:      Social History:  Smoking:  Alcohol:  Drugs:    Allergies:  Tapazole (Hives)      Medications:  acetaminophen   Tablet .. 650 milliGRAM(s) Oral every 6 hours PRN  bethanechol 25 milliGRAM(s) Oral every 8 hours  calcium carbonate 1250 mG  + Vitamin D (OsCal 500 + D) 1 Tablet(s) Oral two times a day  enoxaparin Injectable 40 milliGRAM(s) SubCutaneous daily  escitalopram 10 milliGRAM(s) Oral daily  folic acid 1 milliGRAM(s) Oral daily  levothyroxine 112 MICROGram(s) Oral daily  lidocaine   Patch 1 Patch Transdermal daily  metoprolol tartrate 25 milliGRAM(s) Oral every 12 hours  multivitamin 1 Tablet(s) Oral daily  nystatin Powder 1 Application(s) Topical two times a day  oxyCODONE    IR 5 milliGRAM(s) Oral every 6 hours PRN  polyethylene glycol 3350 17 Gram(s) Oral daily  senna 2 Tablet(s) Oral at bedtime  tamoxifen 20 milliGRAM(s) Oral daily  tamsulosin 0.4 milliGRAM(s) Oral at bedtime  traZODone 50 milliGRAM(s) Oral at bedtime  zinc oxide 40% Ointment 1 Application(s) Topical two times a day      REVIEW OF SYSTEMS:  CONSTITUTIONAL: No fever, weight loss, or fatigue  EYES: No eye pain, visual disturbances, or discharge  ENMT:  No difficulty hearing, tinnitus, vertigo; No sinus or throat pain  NECK: No pain or stiffness  BREASTS: No pain, masses, or nipple discharge  RESPIRATORY: No cough, wheezing, chills or hemoptysis; No shortness of breath  CARDIOVASCULAR: No chest pain, palpitations, dizziness, or leg swelling  GASTROINTESTINAL: No abdominal or epigastric pain. No nausea, vomiting, or hematemesis; No diarrhea or constipation. No melena or hematochezia.  GENITOURINARY: No dysuria, frequency, hematuria, or incontinence  NEUROLOGICAL: No headaches, memory loss, loss of strength, numbness, or tremors  SKIN: No itching, burning, rashes, or lesions   LYMPH NODES: No enlarged glands  ENDOCRINE: No heat or cold intolerance; No hair loss  MUSCULOSKELETAL: No joint pain or swelling; No muscle, back, or extremity pain  PSYCHIATRIC: No depression, anxiety, mood swings, or difficulty sleeping  HEME/LYMPH: No easy bruising, or bleeding gums  ALLERY AND IMMUNOLOGIC: No hives or eczema    Physical Exam:  T(C): 36.8 (06-03-21 @ 11:23), Max: 37.4 (06-02-21 @ 23:58)  HR: 80 (06-03-21 @ 11:23) (70 - 155)  BP: 108/68 (06-03-21 @ 11:23) (87/61 - 117/61)  RR: 17 (06-03-21 @ 11:23) (15 - 17)  SpO2: 99% (06-03-21 @ 11:23) (92% - 99%)  Wt(kg): --    GENERAL: NAD, well-groomed, well-developed  HEAD:  Atraumatic, Normocephalic  EYES: EOMI, conjunctiva and sclera clear  ENT: Moist mucous membranes,  NECK: Supple, No JVD, no bruits  CHEST/LUNG: Clear to percussion bilaterally; No rales, rhonchi, wheezing, or rubs  HEART: Regular rate and rhythm; No murmurs, rubs, or gallops PMI non displaced.  ABDOMEN: Soft, Nontender, Nondistended; Bowel sounds present  EXTREMITIES:  2+ Peripheral Pulses, No clubbing, cyanosis, or edema  SKIN: No rashes or lesions  NERVOUS SYSTEM:  Cranial Nerves II-XII intact states the president is [place] [facility] year 2021    Cardiovascular Diagnostic Testing:  ECG:    < from: 12 Lead ECG (06.02.21 @ 22:22) >    Diagnosis Line Supraventricular tachycardia  Nonspecific ST and T wave abnormality  Abnormal ECG  When compared with ECG of 17-MAY-2021 14:35,  Vent. rate has increased BY  70 BPM  Non-specific change in ST segment in Lateral leads    Confirmed by PAUL WETZEL MD ([id number]) on 6/3/2021 8:55:42 AM    < end of copied text >      ECHO:    < from: TTE with Doppler (w/Cont) (04.23.21 @ 06:44) >  ---------  Conclusions:  1. Normal mitral valve. Minimal mitral regurgitation.  2. Normal trileaflet aortic valve. No aortic valve  regurgitation seen.  3. Endocardial visualization enhanced with intravenous  injection of Ultrasonic Enhancing Agent (Definity). Normal  left ventricular systolic function. No segmental wall  motion abnormalities.  4. Normal right ventricular size and function.  5. Estimated pulmonary artery systolic pressure equals 42  mm Hg, assuming right atrial pressure equals 8 mm Hg,  consistent with mild pulmonary pressures.  *** No previous Echo exam.  ------------------------------------------------------------------------  Confirmed on  4/23/2021 - 08:51:02 by Alireza Moreno M.D.  ------------------------------------------------------------------------    < end of copied text >    tel    svt to rsr 2 am  Labs:                        12.4   10.41 )-----------( 201      ( 03 Jun 2021 05:30 )             38.5     06-03    141  |  107  |  19  ----------------------------<  111<H>  4.1   |  24  |  0.60    Ca    8.7      03 Jun 2021 05:30  Mg     2.0     06-03    TPro  6.1  /  Alb  3.0<L>  /  TBili  0.5  /  DBili  x   /  AST  15  /  ALT  26  /  AlkPhos  163<H>  06-03      CARDIAC MARKERS ( 03 Jun 2021 05:30 )  <.017 ng/mL / x     / x     / x     / x      CARDIAC MARKERS ( 03 Jun 2021 00:18 )  <.017 ng/mL / x     / x     / x     / x          Thyroid Stimulating Hormone, Serum: 0.41 uIU/mL (06-03 @ 05:30)      Imaging:    < from: Xray Chest 1 View- PORTABLE-Routine (04.25.21 @ 05:24) >  IMPRESSION: Multiple left-sided ribfractures are present. The cardiac silhouette cannot be evaluated. There is a small left pleural effusion. The right lung is grossly clear.      LIZETTE BARNETT MD; Attending Radiologist  This document has been electronically signed. Apr 25 2021  9:35AM    < end of copied text >    impression  svt  most likely represents an atrial tachycardia consider calcium channel blocker or beta blocker. previously to be seen by dr ken for a possible ablation    obs  history of subdural and history of "falls" high fall risk     Chief Complaint:     Mrs Arthur is a 66y old  Female with HTN, hypothyroidism, hx of breast Ca, hx of mastectomy, transferred from acute rehab [address] [place] [address] due to SVT - rate of 150, asso with palpitations, lightheaded ness and mild dyspnea.  Pt was recently hospitalized at [facility] 4/19/21 for s/p fall- she acquired a SAH, left rib fractures 3-8, left iliac wing fracture with hematoma, received 1 uPRBC for acute blood loss anemia 4/22/21, left clavicle fracture, left nasal bone and left orbital rim fractures, sternal fracture, left pinky PIP dislocation- s/p closed reduction.  Pt had repeat head CT 4/21, was stable.  During that hospitalization, pt had episode of SVT and Metoprolol was resumed. Pt was stabilized, was d/celine, and admitted here to [facility] for acute rehab on 04/24/21.Pt has been okay until this evening around 10:20 PM, pt started to c/o palpitations, lightheadedness, and mild dyspnea.  Pt apparently did not receive Metoprolol dose yesterday and this AM - was held based on parameters.Pt seen and examined, alert in no acute distress. EKG done showed SVT rate of 150/min.  BP was 106/60.  O2 sat 96% on room air.  Pt denies chest pain, cough, fever, chills, leg pain.  CT Angio Chest done and did not show any pulm embolism.  Lopressor 25 mg po x 1 given and pt transferred to Telemetry for further eval, monitoring and management. Pt had an Echo 4/23/21 w/c reported normal LV fn, normal mitral valve. Minimal mitral regurgitation. Normal trileaflet aortic valve. No aortic valve regurgitation seen. No segmental wall motion abnormalities. Normal right ventricular size and function. cardiolgy eval requested    HPI:    PMH:   Hypothyroidism    Hyperthyroidism    Breast cancer      PSH:   Salivary Gland Disease    C Section    Abnormality, eye    S/P D&amp;C (status post dilation and curettage)    H/O left mastectomy      Family History:  FAMILY HISTORY:      Social History:  Smoking:  Alcohol:  Drugs:    Allergies:  Tapazole (Hives)      Medications:  acetaminophen   Tablet .. 650 milliGRAM(s) Oral every 6 hours PRN  bethanechol 25 milliGRAM(s) Oral every 8 hours  calcium carbonate 1250 mG  + Vitamin D (OsCal 500 + D) 1 Tablet(s) Oral two times a day  enoxaparin Injectable 40 milliGRAM(s) SubCutaneous daily  escitalopram 10 milliGRAM(s) Oral daily  folic acid 1 milliGRAM(s) Oral daily  levothyroxine 112 MICROGram(s) Oral daily  lidocaine   Patch 1 Patch Transdermal daily  metoprolol tartrate 25 milliGRAM(s) Oral every 12 hours  multivitamin 1 Tablet(s) Oral daily  nystatin Powder 1 Application(s) Topical two times a day  oxyCODONE    IR 5 milliGRAM(s) Oral every 6 hours PRN  polyethylene glycol 3350 17 Gram(s) Oral daily  senna 2 Tablet(s) Oral at bedtime  tamoxifen 20 milliGRAM(s) Oral daily  tamsulosin 0.4 milliGRAM(s) Oral at bedtime  traZODone 50 milliGRAM(s) Oral at bedtime  zinc oxide 40% Ointment 1 Application(s) Topical two times a day      REVIEW OF SYSTEMS:  CONSTITUTIONAL: No fever, weight loss, or fatigue  EYES: No eye pain, visual disturbances, or discharge  ENMT:  No difficulty hearing, tinnitus, vertigo; No sinus or throat pain  NECK: No pain or stiffness  BREASTS: No pain, masses, or nipple discharge  RESPIRATORY: No cough, wheezing, chills or hemoptysis; No shortness of breath  CARDIOVASCULAR: No chest pain, palpitations, dizziness, or leg swelling  GASTROINTESTINAL: No abdominal or epigastric pain. No nausea, vomiting, or hematemesis; No diarrhea or constipation. No melena or hematochezia.  GENITOURINARY: No dysuria, frequency, hematuria, or incontinence  NEUROLOGICAL: No headaches, memory loss, loss of strength, numbness, or tremors  SKIN: No itching, burning, rashes, or lesions   LYMPH NODES: No enlarged glands  ENDOCRINE: No heat or cold intolerance; No hair loss  MUSCULOSKELETAL: No joint pain or swelling; No muscle, back, or extremity pain  PSYCHIATRIC: No depression, anxiety, mood swings, or difficulty sleeping  HEME/LYMPH: No easy bruising, or bleeding gums  ALLERY AND IMMUNOLOGIC: No hives or eczema    Physical Exam:  T(C): 36.8 (06-03-21 @ 11:23), Max: 37.4 (06-02-21 @ 23:58)  HR: 80 (06-03-21 @ 11:23) (70 - 155)  BP: 108/68 (06-03-21 @ 11:23) (87/61 - 117/61)  RR: 17 (06-03-21 @ 11:23) (15 - 17)  SpO2: 99% (06-03-21 @ 11:23) (92% - 99%)  Wt(kg): --    GENERAL: NAD, well-groomed, well-developed  HEAD:  Atraumatic, Normocephalic  EYES: EOMI, conjunctiva and sclera clear  ENT: Moist mucous membranes,  NECK: Supple, No JVD, no bruits  CHEST/LUNG: Clear to percussion bilaterally; No rales, rhonchi, wheezing, or rubs  HEART: Regular rate and rhythm; No murmurs, rubs, or gallops PMI non displaced.  ABDOMEN: Soft, Nontender, Nondistended; Bowel sounds present  EXTREMITIES:  2+ Peripheral Pulses, No clubbing, cyanosis, or edema  SKIN: No rashes or lesions  NERVOUS SYSTEM:  Cranial Nerves II-XII intact states the president is [place] [facility] year 2021    Cardiovascular Diagnostic Testing:  ECG:    < from: 12 Lead ECG (06.02.21 @ 22:22) >    Diagnosis Line Supraventricular tachycardia  Nonspecific ST and T wave abnormality  Abnormal ECG  When compared with ECG of 17-MAY-2021 14:35,  Vent. rate has increased BY  70 BPM  Non-specific change in ST segment in Lateral leads    Confirmed by PAUL WETZEL MD ([id number]) on 6/3/2021 8:55:42 AM    < end of copied text >      ECHO:    < from: TTE with Doppler (w/Cont) (04.23.21 @ 06:44) >  ---------  Conclusions:  1. Normal mitral valve. Minimal mitral regurgitation.  2. Normal trileaflet aortic valve. No aortic valve  regurgitation seen.  3. Endocardial visualization enhanced with intravenous  injection of Ultrasonic Enhancing Agent (Definity). Normal  left ventricular systolic function. No segmental wall  motion abnormalities.  4. Normal right ventricular size and function.  5. Estimated pulmonary artery systolic pressure equals 42  mm Hg, assuming right atrial pressure equals 8 mm Hg,  consistent with mild pulmonary pressures.  *** No previous Echo exam.  ------------------------------------------------------------------------  Confirmed on  4/23/2021 - 08:51:02 by Alireza Moreno M.D.  ------------------------------------------------------------------------    < end of copied text >    tel    svt to rsr 2 am  Labs:                        12.4   10.41 )-----------( 201      ( 03 Jun 2021 05:30 )             38.5     06-03    141  |  107  |  19  ----------------------------<  111<H>  4.1   |  24  |  0.60    Ca    8.7      03 Jun 2021 05:30  Mg     2.0     06-03    TPro  6.1  /  Alb  3.0<L>  /  TBili  0.5  /  DBili  x   /  AST  15  /  ALT  26  /  AlkPhos  163<H>  06-03      CARDIAC MARKERS ( 03 Jun 2021 05:30 )  <.017 ng/mL / x     / x     / x     / x      CARDIAC MARKERS ( 03 Jun 2021 00:18 )  <.017 ng/mL / x     / x     / x     / x          Thyroid Stimulating Hormone, Serum: 0.41 uIU/mL (06-03 @ 05:30)      Imaging:    < from: Xray Chest 1 View- PORTABLE-Routine (04.25.21 @ 05:24) >  IMPRESSION: Multiple left-sided ribfractures are present. The cardiac silhouette cannot be evaluated. There is a small left pleural effusion. The right lung is grossly clear.      LIZETTE BARNETT MD; Attending Radiologist  This document has been electronically signed. Apr 25 2021  9:35AM    < end of copied text >    impression  svt  most likely represents an atrial tachycardia consider calcium channel blocker or beta blocker. piror issues with a "soft" blood pressure vs control of tachycardia noted. currently on metoprolol 25 bid was 25 daily. previously to be seen by dr rogers for a possible ablation.     obs  history of subdural and history of "falls" high fall risk

## 2021-06-03 NOTE — PROVIDER CONTACT NOTE (CHANGE IN STATUS NOTIFICATION) - ASSESSMENT
Patient stable at start of this shift. /61, HR: 88 at 19:35. Vitals checked at 21:59, , BP 87/61. Gave water, retaken VS at 22:00, , /70. Pt says they feel "lightheaded and dizzy" and feeling like heart is racing. Dr. Mcdonald made aware. Manual BP taken at 22:05, /64 , patient still feeling dizzy and lightheaded. Patient stable at start of this shift. /61, HR: 88 at 19:35. Vitals checked at 22:18, , BP 87/61. Gave water, retaken VS at 22:20, , /70. Pt says they feel "lightheaded and dizzy" and feeling like heart is racing. Dr. Mcdonald made aware. Manual BP taken at 22:23, /64 , patient still feeling dizzy and lightheaded. Patient stable at start of this shift. /61, HR: 88 at 19:35. Vitals checked at 22:18, , BP 87/61. Gave water, retaken VS at 22:20, , /70. Pt says they feel "lightheaded and dizzy" and feeling like heart is racing. Dr. Mcdonald made aware. Manual BP taken at 22:23, /64 , patient still feeling dizzy and lightheaded. EKG showed patient in SVT.

## 2021-06-03 NOTE — PROVIDER CONTACT NOTE (OTHER) - ACTION/TREATMENT ORDERED:
2.5mg Metoprolol IVP  10mg Inderal tab po   LR 500ml bolus  increase LR 1L  to 200ml/hr after the bolus  cardizem 10mg IVP
metoprolol 2.5mg IVP

## 2021-06-03 NOTE — PROVIDER CONTACT NOTE (MEDICATION) - ACTION/TREATMENT ORDERED:
Held all 2200 medications besides STAT dose of 25mg Lopressor and started 1000mL Lactated Ringers 100ml/hr.

## 2021-06-03 NOTE — CHART NOTE - NSCHARTNOTESELECT_GEN_ALL_CORE
Nutrition Services
Result and plan update/Event Note
SVT/Event Note
fall/Event Note
Agitation Monitoring
Event Note
Event Note
Nutrition Services

## 2021-06-03 NOTE — H&P ADULT - NSHPPHYSICALEXAM_GEN_ALL_CORE
Vital Signs (24 Hrs):  T(C): 36.9 (06-02-21 @ 19:35), Max: 36.9 (06-02-21 @ 19:35)  HR: 144 (06-02-21 @ 21:55) (69 - 155)  BP: 102/64 (06-02-21 @ 21:55) (87/61 - 121/77)  RR: 15 (06-02-21 @ 19:35) (14 - 15)  SpO2: 92% (06-02-21 @ 19:35) (92% - 95%)

## 2021-06-03 NOTE — H&P ADULT - ASSESSMENT
Mrs Arthur who is a 66y old  Female with HTN, hypothyroidism, hx of breast Ca, hx of mastectomy, transferred from acute rehab [address] [place] [address] due to SVT - rate of 150, asso with palpitations, lightheaded ness and mild dyspnea.  Pt was recently hospitalized at [facility] 4/19/21 for s/p fall- she acquired a SAH, left rib fractures 3-8, left iliac wing fracture with hematoma, received 1 uPRBC for acute blood loss anemia 4/22/21, left clavicle fracture, left nasal bone and left orbital rim fractures, sternal fracture, left pinky PIP dislocation- s/p closed reduction.  Pt had repeat head CT 4/21, was stable.  During that hospitalization, pt had episode of SVT and Metoprolol was resumed.   Pt was stabilized, was d/celine, and admitted here to [place] for acute rehab on 04/24/21.  Pt has been okay until this evening around 10:20 PM, pt started to c/o palpitations, lightheadedness, and mild dyspnea.  Pt apparently did not receive Metoprolol dose yesterday and this AM - was held based on parameters.  Pt seen and examined, alert in no acute distress.   EKG done showed SVT rate of 150/min.  BP was 106/60.  O2 sat 96% on room air.  Pt denies chest pain, cough, fever, chills, leg pain.  CT Angio Chest ordered - awaiting test.   Lopressor 25 mg po x 1 given and pt transferred to Telemetry for further eval, monitoring and management.    Admit to telemetry  Stat labs -CBC, BMP, Mg, Troponin ordered  Await CT Angio Chest to r/o PE  Will resume Metoprolol tartrate 25 mg every 12 hours with parameters  IV Hydration pending labs  Cont other meds - Levothyroxine, Folic Acid, tamoxifen, Analgesics prn  DVT Prophylaxis  Cardio eval - Dr Thomas - service notified    IMPROVE VTE Individual Risk Assessment  RISK                                                                Points  [  ] Previous VTE                                                  3  [  ] Thrombophilia                                               2  [  ] Lower limb paralysis                                      2        (unable to hold up >15 seconds)    [  ] Current Cancer                                              2         (within 6 months)  [ x ] Immobilization > 24 hrs                                1  [  ] ICU/CCU stay > 24 hours                              1  [x  ] Age > 60                                                      1  IMPROVE VTE Score __2__  IMPROVE Score 0-1: Low Risk, No VTE prophylaxis required for most patients, encourage ambulation.   IMPROVE Score 2-3: At risk, pharmacologic VTE prophylaxis is indicated for most patients (in the absence of a contraindication)  IMPROVE Score > or = 4: High Risk, pharmacologic VTE prophylaxis is indicated for most patients (in the absence of a contraindication)   Mrs Arthur who is a 66y old  Female with HTN, hypothyroidism, hx of breast Ca, hx of mastectomy, transferred from acute rehab [address] [place] [address] due to SVT - rate of 150, asso with palpitations, lightheaded ness and mild dyspnea.  Pt was recently hospitalized at [facility] 4/19/21 for s/p fall- she acquired a SAH, left rib fractures 3-8, left iliac wing fracture with hematoma, received 1 uPRBC for acute blood loss anemia 4/22/21, left clavicle fracture, left nasal bone and left orbital rim fractures, sternal fracture, left pinky PIP dislocation- s/p closed reduction.  Pt had repeat head CT 4/21, was stable.  During that hospitalization, pt had episode of SVT and Metoprolol was resumed.   Pt was stabilized, was d/celine, and admitted here to [place] for acute rehab on 04/24/21.  Pt has been okay until this evening around 10:20 PM, pt started to c/o palpitations, lightheadedness, and mild dyspnea.  Pt apparently did not receive Metoprolol dose yesterday and this AM - was held based on parameters.  Pt seen and examined, alert in no acute distress.   EKG done showed SVT rate of 150/min.  BP was 106/60.  O2 sat 96% on room air.  Pt denies chest pain, cough, fever, chills, leg pain.  CT Angio Chest ordered - awaiting test.   Lopressor 25 mg po x 1 given and pt transferred to Telemetry for further eval, monitoring and management.    Admit to telemetry  Stat labs -CBC, BMP, Mg, Troponin ordered  Will resume Metoprolol tartrate 25 mg every 12 hours with parameters  IV Hydration pending labs  Cont other meds - Levothyroxine, Folic Acid, tamoxifen, Analgesics prn  DVT Prophylaxis  Cardio eval - Dr Thomas - service notified    IMPROVE VTE Individual Risk Assessment  RISK                                                                Points  [  ] Previous VTE                                                  3  [  ] Thrombophilia                                               2  [  ] Lower limb paralysis                                      2        (unable to hold up >15 seconds)    [  ] Current Cancer                                              2         (within 6 months)  [ x ] Immobilization > 24 hrs                                1  [  ] ICU/CCU stay > 24 hours                              1  [x  ] Age > 60                                                      1  IMPROVE VTE Score __2__  IMPROVE Score 0-1: Low Risk, No VTE prophylaxis required for most patients, encourage ambulation.   IMPROVE Score 2-3: At risk, pharmacologic VTE prophylaxis is indicated for most patients (in the absence of a contraindication)  IMPROVE Score > or = 4: High Risk, pharmacologic VTE prophylaxis is indicated for most patients (in the absence of a contraindication)   66y old  Female with HTN, hypothyroidism, hx of breast Ca, hx of mastectomy, transferred from acute rehab [address] [place] [address] due to SVT - rate of 150, asso with palpitations, lightheaded ness and mild dyspnea.  Pt was recently hospitalized at [facility] 4/19/21 for s/p fall- she acquired a SAH, left rib fractures 3-8, left iliac wing fracture with hematoma, received 1 uPRBC for acute blood loss anemia 4/22/21, left clavicle fracture, left nasal bone and left orbital rim fractures, sternal fracture, left pinky PIP dislocation- s/p closed reduction.  Pt has been okay until this evening around 10:20 PM, pt started to c/o palpitations, lightheadedness, and mild dyspnea.  Pt apparently did not receive Metoprolol dose yesterday and this AM - was held based on parameters.  EKG done showed SVT rate of 150/min.    Lopressor 25 mg po x 1 given and pt transferred to Telemetry for further eval, monitoring and management.    SVT    Admit to telemetry  Stat labs -CBC, BMP, Mg, Troponin ordered  Will give either Lopressor IV or Adenosine if SVT persistent  Will resume Metoprolol tartrate 25 mg every 12 hours with parameters  IV Hydration pending labs  Cont other meds - Levothyroxine, Folic Acid, tamoxifen, Analgesics prn  DVT Prophylaxis  Cardio eval - Dr Thomas - service notified    IMPROVE VTE Individual Risk Assessment  RISK                                                                Points  [  ] Previous VTE                                                  3  [  ] Thrombophilia                                               2  [  ] Lower limb paralysis                                      2        (unable to hold up >15 seconds)    [  ] Current Cancer                                              2         (within 6 months)  [ x ] Immobilization > 24 hrs                                1  [  ] ICU/CCU stay > 24 hours                              1  [x  ] Age > 60                                                      1  IMPROVE VTE Score __2__  IMPROVE Score 0-1: Low Risk, No VTE prophylaxis required for most patients, encourage ambulation.   IMPROVE Score 2-3: At risk, pharmacologic VTE prophylaxis is indicated for most patients (in the absence of a contraindication)  IMPROVE Score > or = 4: High Risk, pharmacologic VTE prophylaxis is indicated for most patients (in the absence of a contraindication)   66y old  Female with HTN, hypothyroidism, hx of breast Ca, hx of mastectomy, transferred from acute rehab [address] [place] [address] due to SVT - rate of 150, asso with palpitations, lightheaded ness and mild dyspnea.  Pt was recently hospitalized at [facility] 4/19/21 for s/p fall- she acquired a SAH, left rib fractures 3-8, left iliac wing fracture with hematoma, received 1 uPRBC for acute blood loss anemia 4/22/21, left clavicle fracture, left nasal bone and left orbital rim fractures, sternal fracture, left pinky PIP dislocation- s/p closed reduction.  Pt apparently did not receive Metoprolol dose yesterday and this AM - was held based on parameters.  EKG done showed SVT rate of 150/min.    Lopressor 25 mg po x 1 given and pt transferred to Telemetry for further eval, monitoring and management.    SVT    Admit to telemetry  Stat labs -CBC, BMP, Mg, Troponin ordered, check TSH  Will give either Lopressor IV or Adenosine if SVT persistent  Will resume Metoprolol tartrate 25 mg every 12 hours with parameters  IV Hydration pending labs  Cont other meds - Levothyroxine, Folic Acid, tamoxifen, Flomax, Betanechol,  Analgesics prn  DVT Prophylaxis  Cardio eval - Dr Thomas - service notified    IMPROVE VTE Individual Risk Assessment  RISK                                                                Points  [  ] Previous VTE                                                  3  [  ] Thrombophilia                                               2  [  ] Lower limb paralysis                                      2        (unable to hold up >15 seconds)    [  ] Current Cancer                                              2         (within 6 months)  [ x ] Immobilization > 24 hrs                                1  [  ] ICU/CCU stay > 24 hours                              1  [x  ] Age > 60                                                      1  IMPROVE VTE Score __2__  IMPROVE Score 0-1: Low Risk, No VTE prophylaxis required for most patients, encourage ambulation.   IMPROVE Score 2-3: At risk, pharmacologic VTE prophylaxis is indicated for most patients (in the absence of a contraindication)  IMPROVE Score > or = 4: High Risk, pharmacologic VTE prophylaxis is indicated for most patients (in the absence of a contraindication)

## 2021-06-03 NOTE — PROVIDER CONTACT NOTE (MEDICATION) - ASSESSMENT
Patient stable at beginning of shift. Became tachycardic around 22:18, , BP 87/61. Retook Vitals. /70 . Manual /64, . Patient felt dizzy, lightheaded. Denies chest pain. EKG ordered. EKG showed patient in episode of SVT.
BP is 102/65  no parameters on bethanechol which lowers bp

## 2021-06-03 NOTE — PROVIDER CONTACT NOTE (CHANGE IN STATUS NOTIFICATION) - ACTION/TREATMENT ORDERED:
Doctor Tamara ordered EKG and retake manual BP. Metoprolol tartrate 25mg oral STAT dose. Hospitalist, Dr. Sultana ordered 1000mL Lactated Ringers 100mL/hr. CT scan STAT. Discharge to Telemetry. Doctor Mcdonald ordered EKG and retake manual BP. Metoprolol tartrate 25mg oral STAT dose. Ordered 1000mL Lactated Ringers 100mL/hr. CT scan STAT. Discharge to Telemetry at approximately 23:45. Doctor Mcdonald ordered EKG and take a manual BP. Metoprolol tartrate 25mg oral STAT dose. Ordered 1000mL Lactated Ringers 100mL/hr. CT scan STAT. Discharge to Telemetry at approximately 23:45.

## 2021-06-03 NOTE — PROVIDER CONTACT NOTE (MEDICATION) - SITUATION
Patient being discharged to telemetry after EKG showed episode of SVT. Patient sent for STAT Catscan and then to be discharged to telemetry.
/65

## 2021-06-03 NOTE — H&P ADULT - HISTORY OF PRESENT ILLNESS
Patient is a 66y old  Female who presents with a chief complaint of SAH and fractures of left-sided ribs, left iliac wing with hematoma, S/P closed reduction of left pinky PIP dislocation after trauma (01 Jun 2021 11:33)      HPI:  65F PMHx breast cancer s/p mastectomy, HTN, hypothyroidism, not on full anticoagulation/antiplatelets per EMS, presented to [facility] 4/19/21as a level 2 trauma activation after a fall from a flight of stairs. Patient only able to say "ow" in trauma bay, unable to provide further history. However per son, normal mentation at baseline. In the trauma bay, airway was intact with bilateral breath sounds, O2 saturation 100% on room air. Initially blood pressure was 140s systolic however on repeat 90s. NS@100 initiated. Secondary significant for left forehead laceration, left chest wall tenderness, left pelvic tenderness.     Ms. Arthur was hypotensive in the trauma bay and was transferred to the Surgical ICU after imaging was obtained. Her injury list is below.  (1) SAH, bilateral: stable on repeat imaging. Seven day course of Keppra for post-traumatic seizure prophylaxis.  (2) left 3-8 and right 7th rib fractures with occult left pneumothorax: pain control  (3) left iliac wing fracture with hematoma: required transfusion early in her ICU course, after which CBC were stable  (4) left forehead laceration s/p suture repair  (5) s/p successful closed reduction of left pinky PIP dislocation with delfin splint  (6) left clavicle fracture  (7) left nasal bone and left orbital rim fractures  (8) sternal fracture  Incidental findings: right renal lesion. Pt's  was notified and copy of the report given to him.     The patient was admitted to Trauma Surgery under SICU care.  Ortho was consulted for L. iliac wing fx, sternal fx, L. clavicle fx- recommended WBAT of the affected left lower extremity with walker, WBAT L shoulder, pendulum exercises okay, no lifting, sling for comfort, Encourage ROM of elbow/wrist/hand, PT/OT consult, NO urgent orthopedic surgical intervention at this time.    Neurosurgery consulted- recommended repeat CTH, which was stable.     PRS was consulted for non displaced left superior orbital rim and non displaced left nasal bone fracture--> No indication for operative fixation of these non displaced fractures; recommend ice to face to aid in swelling and analgesia.    She was placed on hemorrhage watch in the SICU given pelvic fracture and surrounding hematoma.  Behavior health was consulted for delirium.    Hand was consulted for L. erlinda PIP dislocation- s/p closed reduction, rec Cont delfin taping for now.    4/21- CTH stable  - Added home lexapro  - Added NS @ 50cc/hr to supplement poor PO intake  - Lactate cleared (1.9)    4/22- Transfused 1u pRBC, Hct 20.9 -> 26.9  - Went into SVT, given adenosine x1 and started on metoprolol 25mg q12hr  - Added oxycodone 5mg q4hrs prn for increased pain control  - PO intake improved, IVL    4/23- EP was consulted for SVT, recc continue lopressor 25mg   Metoprolol PO increased from 25mg q12hrs to 25mg q6hrs.   - Added salt tabs 1g q8hrs and started on NS @ 50cc/hr for hyponatremia  - Spangler discontinued, passed TOV  - Added lovenox  - Cervical collar cleared by confrontational exam     4/24- Patient transferred from SICU to surgical floor in stable condition.  Discharge planning to acute rehab.     Stable from neurologic perspective consistent with mild TBI  Multiple rib fractures - breathing comfortable with multimodal pain control   Mrs Arthur who is a 66y old  Female with HTN, hypothyroidism, hx of breast Ca, hx of mastectomy, transferred from acute rehab [address] [place] [address] due to SVT - rate of 150, asso with palpitations, lightheaded ness and mild dyspnea.  Pt was recently hospitalized at [facility] 4/19/21 for s/p fall- she acquired a SAH, left rib fractures 3-8, left iliac wing fracture with hematoma, received 1 uPRBC for acute blood loss anemia 4/22/21, left clavicle fracture, left nasal bone and left orbital rim fractures, sternal fracture, left pinky PIP dislocation- s/p closed reduction.  Pt had repeat head CT 4/21, was stable.  During that hospitalization, pt had episode of SVT and Metoprolol was resumed.   Pt was stabilized, was d/celine, and admitted here to [place] for acute rehab on 04/24/21.  Pt has been okay until this evening around 10:20 PM, pt started to c/o palpitations, lightheadedness, and mild dyspnea.  Pt apparently has not received Metoprolol dose this AM because HR was less than 60.      Mrs Arthur who is a 66y old  Female with HTN, hypothyroidism, hx of breast Ca, hx of mastectomy, transferred from acute rehab [address] [place] [address] due to SVT - rate of 150, asso with palpitations, lightheaded ness and mild dyspnea.  Pt was recently hospitalized at [facility] 4/19/21 for s/p fall- she acquired a SAH, left rib fractures 3-8, left iliac wing fracture with hematoma, received 1 uPRBC for acute blood loss anemia 4/22/21, left clavicle fracture, left nasal bone and left orbital rim fractures, sternal fracture, left pinky PIP dislocation- s/p closed reduction.  Pt had repeat head CT 4/21, was stable.  During that hospitalization, pt had episode of SVT and Metoprolol was resumed.   Pt was stabilized, was d/celine, and admitted here to [place] for acute rehab on 04/24/21.  Pt has been okay until this evening around 10:20 PM, pt started to c/o palpitations, lightheadedness, and mild dyspnea.  Pt apparently has not received Metoprolol dose this AM because HR was less than 60.  Pt seen and examined, alert in no acute distress.   EKG done showed SVT rate of 150/min.  BP was 106/60.  O2 sat 96% on room air.  Pt denies chest pain, cough, fever, chills, leg pain.  CT Angio Chest ordered - awaiting test.   Lopressor 25 mg po x 1 given and pt transferred to Telemetry for further eval, monitoring and management.     Mrs Arthur who is a 66y old  Female with HTN, hypothyroidism, hx of breast Ca, hx of mastectomy, transferred from acute rehab [address] [place] [address] due to SVT - rate of 150, asso with palpitations, lightheaded ness and mild dyspnea.  Pt was recently hospitalized at [facility] 4/19/21 for s/p fall- she acquired a SAH, left rib fractures 3-8, left iliac wing fracture with hematoma, received 1 uPRBC for acute blood loss anemia 4/22/21, left clavicle fracture, left nasal bone and left orbital rim fractures, sternal fracture, left pinky PIP dislocation- s/p closed reduction.  Pt had repeat head CT 4/21, was stable.  During that hospitalization, pt had episode of SVT and Metoprolol was resumed.   Pt was stabilized, was d/celine, and admitted here to [place] for acute rehab on 04/24/21.  Pt has been okay until this evening around 10:20 PM, pt started to c/o palpitations, lightheadedness, and mild dyspnea.  Pt apparently did not receive Metoprolol dose yesterday and this AM - was held based on parameters.  Pt seen and examined, alert in no acute distress.   EKG done showed SVT rate of 150/min.  BP was 106/60.  O2 sat 96% on room air.  Pt denies chest pain, cough, fever, chills, leg pain.  CT Angio Chest ordered - awaiting test.   Lopressor 25 mg po x 1 given and pt transferred to Telemetry for further eval, monitoring and management.     Mrs Arthur who is a 66y old  Female with HTN, hypothyroidism, hx of breast Ca, hx of mastectomy, transferred from acute rehab [address] [place] [address] due to SVT - rate of 150, asso with palpitations, lightheaded ness and mild dyspnea.  Pt was recently hospitalized at [facility] 4/19/21 for s/p fall- she acquired a SAH, left rib fractures 3-8, left iliac wing fracture with hematoma, received 1 uPRBC for acute blood loss anemia 4/22/21, left clavicle fracture, left nasal bone and left orbital rim fractures, sternal fracture, left pinky PIP dislocation- s/p closed reduction.  Pt had repeat head CT 4/21, was stable.  During that hospitalization, pt had episode of SVT and Metoprolol was resumed.   Pt was stabilized, was d/celine, and admitted here to [place] for acute rehab on 04/24/21.  Pt has been okay until this evening around 10:20 PM, pt started to c/o palpitations, lightheadedness, and mild dyspnea.  Pt apparently did not receive Metoprolol dose yesterday and this AM - was held based on parameters.  Pt seen and examined, alert in no acute distress.   EKG done showed SVT rate of 150/min.  BP was 106/60.  O2 sat 96% on room air.  Pt denies chest pain, cough, fever, chills, leg pain.  CT Angio Chest done and did not show any pulm embolism.    Lopressor 25 mg po x 1 given and pt transferred to Telemetry for further eval, monitoring and management.     Mrs Arthur is a 66y old  Female with HTN, hypothyroidism, hx of breast Ca, hx of mastectomy, transferred from acute rehab [address] [place] [address] due to SVT - rate of 150, asso with palpitations, lightheaded ness and mild dyspnea.  Pt was recently hospitalized at [facility] 4/19/21 for s/p fall- she acquired a SAH, left rib fractures 3-8, left iliac wing fracture with hematoma, received 1 uPRBC for acute blood loss anemia 4/22/21, left clavicle fracture, left nasal bone and left orbital rim fractures, sternal fracture, left pinky PIP dislocation- s/p closed reduction.  Pt had repeat head CT 4/21, was stable.  During that hospitalization, pt had episode of SVT and Metoprolol was resumed.   Pt was stabilized, was d/celine, and admitted here to [place] for acute rehab on 04/24/21.  Pt has been okay until this evening around 10:20 PM, pt started to c/o palpitations, lightheadedness, and mild dyspnea.  Pt apparently did not receive Metoprolol dose yesterday and this AM - was held based on parameters.  Pt seen and examined, alert in no acute distress.   EKG done showed SVT rate of 150/min.  BP was 106/60.  O2 sat 96% on room air.  Pt denies chest pain, cough, fever, chills, leg pain.  CT Angio Chest done and did not show any pulm embolism.    Lopressor 25 mg po x 1 given and pt transferred to Telemetry for further eval, monitoring and management.     Mrs Arthur is a 66y old  Female with HTN, hypothyroidism, hx of breast Ca, hx of mastectomy, transferred from acute rehab [address] [place] [address] due to SVT - rate of 150, asso with palpitations, lightheaded ness and mild dyspnea.  Pt was recently hospitalized at [facility] 4/19/21 for s/p fall- she acquired a SAH, left rib fractures 3-8, left iliac wing fracture with hematoma, received 1 uPRBC for acute blood loss anemia 4/22/21, left clavicle fracture, left nasal bone and left orbital rim fractures, sternal fracture, left pinky PIP dislocation- s/p closed reduction.  Pt had repeat head CT 4/21, was stable.  During that hospitalization, pt had episode of SVT and Metoprolol was resumed.   Pt was stabilized, was d/celine, and admitted here to [place] for acute rehab on 04/24/21.  Pt has been okay until this evening around 10:20 PM, pt started to c/o palpitations, lightheadedness, and mild dyspnea.  Pt apparently did not receive Metoprolol dose yesterday and this AM - was held based on parameters.  Pt seen and examined, alert in no acute distress.   EKG done showed SVT rate of 150/min.  BP was 106/60.  O2 sat 96% on room air.  Pt denies chest pain, cough, fever, chills, leg pain.  CT Angio Chest done and did not show any pulm embolism.    Lopressor 25 mg po x 1 given and pt transferred to Telemetry for further eval, monitoring and management.  1. Normal mitral valve. Minimal mitral regurgitation.  2. Normal trileaflet aortic valve. No aortic valve  regurgitation seen.  3. Endocardial visualization enhanced with intravenous  injection of Ultrasonic Enhancing Agent (Definity). Normal  left ventricular systolic function. No segmental wall  motion abnormalities.  4. Normal right ventricular size and function.  5. Estimated pulmonary artery systolic pressure equals 42  mm Hg, assuming right atrial pressure equals 8 mm Hg,  consistent with mild pulmonary pressures.   Mrs Arthur is a 66y old  Female with HTN, hypothyroidism, hx of breast Ca, hx of mastectomy, transferred from acute rehab [address] [place] [address] due to SVT - rate of 150, asso with palpitations, lightheaded ness and mild dyspnea.  Pt was recently hospitalized at [facility] 4/19/21 for s/p fall- she acquired a SAH, left rib fractures 3-8, left iliac wing fracture with hematoma, received 1 uPRBC for acute blood loss anemia 4/22/21, left clavicle fracture, left nasal bone and left orbital rim fractures, sternal fracture, left pinky PIP dislocation- s/p closed reduction.  Pt had repeat head CT 4/21, was stable.  During that hospitalization, pt had episode of SVT and Metoprolol was resumed.   Pt was stabilized, was d/celine, and admitted here to [place] for acute rehab on 04/24/21.  Pt has been okay until this evening around 10:20 PM, pt started to c/o palpitations, lightheadedness, and mild dyspnea.  Pt apparently did not receive Metoprolol dose yesterday and this AM - was held based on parameters.  Pt seen and examined, alert in no acute distress.   EKG done showed SVT rate of 150/min.  BP was 106/60.  O2 sat 96% on room air.  Pt denies chest pain, cough, fever, chills, leg pain.  CT Angio Chest done and did not show any pulm embolism.    Lopressor 25 mg po x 1 given and pt transferred to Telemetry for further eval, monitoring and management.  Pt had an Echo 4/23/21 w/c reported normal LV fn, normal mitral valve. Minimal mitral regurgitation. Normal trileaflet aortic valve. No aortic valve regurgitation seen. No segmental wall motion abnormalities. Normal right ventricular size and function.

## 2021-06-03 NOTE — PROVIDER CONTACT NOTE (CHANGE IN STATUS NOTIFICATION) - SITUATION
Patient was stable at start of shift. /61, HR: 88 at 19:35. Vitals checked at 21:59, , BP 87/61. Gave water, retaken VS at 22:00, , /70. Pt says they feel "lightheaded and dizzy" and feeling like heart is racing. Dr. Mcdonald made aware. Manual BP taken at 22:05, /64 . Patient was stable at start of shift. /61, HR: 88 at 19:35. Vitals checked at 22:18, , BP 87/61. Gave water, retaken VS at 22:20, , /70. Pt says they feel "lightheaded and dizzy" and feeling like heart is racing. Dr. Mcdonald made aware. Manual BP taken at 22:23, /64 .

## 2021-06-03 NOTE — PHYSICAL THERAPY INITIAL EVALUATION ADULT - ADDITIONAL COMMENTS
pt is a questionable historian, information per chart.  Pt reside in a  with her spouse and son. Ambulated with use of R/W/SC outside of the home. Has HHA for IADLS including grocery shopping and laundry, was (I) with all ADLS. pt states she was ambulating w/straight cane on BIU and required assist w/most ADL's pt from BIU due to fall/SAH. pt is a questionable historian, information per chart.  PTA pt resided in a  with her spouse and son. Ambulated with use of R/W/SC outside of the home, Had HHA for IADLS including grocery shopping and laundry, was (I) with all ADLS. pt states she was ambulating w/straight cane on BIU and required assist w/most ADL's

## 2021-06-03 NOTE — H&P ADULT - TIME BILLING
Management of acute medical problem, thorough review of imaging, labs, EKG and close ffup. Discussion with consultant.

## 2021-06-03 NOTE — CHART NOTE - NSCHARTNOTEFT_GEN_A_CORE
At ~10:20 PM, informed by RN that patient began having palpitations. PM vitals before medications were due were /67, HR 150s, 95% SpO2, afebrile. Patient also c/o lightheadedness and dizziness. Denied chest pain or SOB. Patient endorsed voiding into her diaper on exam.    Exam:  Gen: NAD, comfortable  Cardio: tachycardic, regular rhythm  Pulm: CTA B/L  Ext: warm, well perfused, no c/c/e    Plan:  - EKG stat showed SVT, .   - Discussed with hospitalist and gave patient Lopressor 25mg PO stat.   - S/P CTA to r/o PE   - Transfer to telemetry for close monitoring and further medications for SVT  - Spoke with patient's son, Henok, to inform him of events and transfer. Attempted to contact patient's , Silverio, but unable to reach him via number listed in patient's contacts ([phone]) and voicemail full. Patient's son provided Silverio's cell phone ([phone]), but also unable to reach and voicemail full.

## 2021-06-03 NOTE — PROVIDER CONTACT NOTE (CHANGE IN STATUS NOTIFICATION) - BACKGROUND
Patient admitted with traumatic subarachnoid hemorrhage from a fall down the stairs. Had an episode of SVT during stay in [facility]. Patient was stable at start of this shift. Vitals stable: HR 88, /61, no s/s of distress. Patient admitted with traumatic subarachnoid hemorrhage from a fall down the stairs. Had an episode of SVT during stay in [facility]. Patient was stable at start of this shift, HR 88, /61, no s/s of distress.

## 2021-06-04 LAB
ALBUMIN SERPL ELPH-MCNC: 3 G/DL — LOW (ref 3.3–5)
ALP SERPL-CCNC: 147 U/L — HIGH (ref 40–120)
ALT FLD-CCNC: 23 U/L — SIGNIFICANT CHANGE UP (ref 10–45)
ANION GAP SERPL CALC-SCNC: 9 MMOL/L — SIGNIFICANT CHANGE UP (ref 5–17)
AST SERPL-CCNC: 16 U/L — SIGNIFICANT CHANGE UP (ref 10–40)
BASOPHILS # BLD AUTO: 0.03 K/UL — SIGNIFICANT CHANGE UP (ref 0–0.2)
BASOPHILS NFR BLD AUTO: 0.3 % — SIGNIFICANT CHANGE UP (ref 0–2)
BILIRUB SERPL-MCNC: 0.4 MG/DL — SIGNIFICANT CHANGE UP (ref 0.2–1.2)
BUN SERPL-MCNC: 23 MG/DL — SIGNIFICANT CHANGE UP (ref 7–23)
CALCIUM SERPL-MCNC: 9 MG/DL — SIGNIFICANT CHANGE UP (ref 8.4–10.5)
CHLORIDE SERPL-SCNC: 109 MMOL/L — HIGH (ref 96–108)
CO2 SERPL-SCNC: 26 MMOL/L — SIGNIFICANT CHANGE UP (ref 22–31)
COVID-19 SPIKE DOMAIN AB INTERP: POSITIVE
COVID-19 SPIKE DOMAIN ANTIBODY RESULT: 181 U/ML — HIGH
CREAT SERPL-MCNC: 0.68 MG/DL — SIGNIFICANT CHANGE UP (ref 0.5–1.3)
EOSINOPHIL # BLD AUTO: 0.14 K/UL — SIGNIFICANT CHANGE UP (ref 0–0.5)
EOSINOPHIL NFR BLD AUTO: 1.2 % — SIGNIFICANT CHANGE UP (ref 0–6)
GLUCOSE SERPL-MCNC: 107 MG/DL — HIGH (ref 70–99)
HCT VFR BLD CALC: 38.2 % — SIGNIFICANT CHANGE UP (ref 34.5–45)
HGB BLD-MCNC: 12.3 G/DL — SIGNIFICANT CHANGE UP (ref 11.5–15.5)
IMM GRANULOCYTES NFR BLD AUTO: 0.4 % — SIGNIFICANT CHANGE UP (ref 0–1.5)
LYMPHOCYTES # BLD AUTO: 1.57 K/UL — SIGNIFICANT CHANGE UP (ref 1–3.3)
LYMPHOCYTES # BLD AUTO: 13.4 % — SIGNIFICANT CHANGE UP (ref 13–44)
MAGNESIUM SERPL-MCNC: 2 MG/DL — SIGNIFICANT CHANGE UP (ref 1.6–2.6)
MCHC RBC-ENTMCNC: 29.2 PG — SIGNIFICANT CHANGE UP (ref 27–34)
MCHC RBC-ENTMCNC: 32.2 GM/DL — SIGNIFICANT CHANGE UP (ref 32–36)
MCV RBC AUTO: 90.7 FL — SIGNIFICANT CHANGE UP (ref 80–100)
MONOCYTES # BLD AUTO: 0.72 K/UL — SIGNIFICANT CHANGE UP (ref 0–0.9)
MONOCYTES NFR BLD AUTO: 6.2 % — SIGNIFICANT CHANGE UP (ref 2–14)
NEUTROPHILS # BLD AUTO: 9.19 K/UL — HIGH (ref 1.8–7.4)
NEUTROPHILS NFR BLD AUTO: 78.5 % — HIGH (ref 43–77)
NRBC # BLD: 0 /100 WBCS — SIGNIFICANT CHANGE UP (ref 0–0)
PLATELET # BLD AUTO: 189 K/UL — SIGNIFICANT CHANGE UP (ref 150–400)
POTASSIUM SERPL-MCNC: 4.5 MMOL/L — SIGNIFICANT CHANGE UP (ref 3.5–5.3)
POTASSIUM SERPL-SCNC: 4.5 MMOL/L — SIGNIFICANT CHANGE UP (ref 3.5–5.3)
PROT SERPL-MCNC: 6.2 G/DL — SIGNIFICANT CHANGE UP (ref 6–8.3)
RBC # BLD: 4.21 M/UL — SIGNIFICANT CHANGE UP (ref 3.8–5.2)
RBC # FLD: 13.2 % — SIGNIFICANT CHANGE UP (ref 10.3–14.5)
SARS-COV-2 IGG+IGM SERPL QL IA: 181 U/ML — HIGH
SARS-COV-2 IGG+IGM SERPL QL IA: POSITIVE
SODIUM SERPL-SCNC: 144 MMOL/L — SIGNIFICANT CHANGE UP (ref 135–145)
WBC # BLD: 11.7 K/UL — HIGH (ref 3.8–10.5)
WBC # FLD AUTO: 11.7 K/UL — HIGH (ref 3.8–10.5)

## 2021-06-04 PROCEDURE — 93010 ELECTROCARDIOGRAM REPORT: CPT

## 2021-06-04 PROCEDURE — 99232 SBSQ HOSP IP/OBS MODERATE 35: CPT

## 2021-06-04 RX ORDER — METOPROLOL TARTRATE 50 MG
25 TABLET ORAL
Refills: 0 | Status: DISCONTINUED | OUTPATIENT
Start: 2021-06-04 | End: 2021-06-04

## 2021-06-04 RX ORDER — METOPROLOL TARTRATE 50 MG
25 TABLET ORAL
Refills: 0 | Status: DISCONTINUED | OUTPATIENT
Start: 2021-06-05 | End: 2021-06-09

## 2021-06-04 RX ADMIN — Medication 1 MILLIGRAM(S): at 11:03

## 2021-06-04 RX ADMIN — Medication 25 MILLIGRAM(S): at 15:07

## 2021-06-04 RX ADMIN — POLYETHYLENE GLYCOL 3350 17 GRAM(S): 17 POWDER, FOR SOLUTION ORAL at 11:04

## 2021-06-04 RX ADMIN — Medication 112 MICROGRAM(S): at 05:24

## 2021-06-04 RX ADMIN — ZINC OXIDE 1 APPLICATION(S): 200 OINTMENT TOPICAL at 17:51

## 2021-06-04 RX ADMIN — Medication 25 MILLIGRAM(S): at 05:24

## 2021-06-04 RX ADMIN — LIDOCAINE 1 PATCH: 4 CREAM TOPICAL at 11:04

## 2021-06-04 RX ADMIN — SENNA PLUS 2 TABLET(S): 8.6 TABLET ORAL at 21:06

## 2021-06-04 RX ADMIN — Medication 1 TABLET(S): at 11:03

## 2021-06-04 RX ADMIN — Medication 1 TABLET(S): at 17:44

## 2021-06-04 RX ADMIN — ZINC OXIDE 1 APPLICATION(S): 200 OINTMENT TOPICAL at 05:24

## 2021-06-04 RX ADMIN — LIDOCAINE 1 PATCH: 4 CREAM TOPICAL at 00:08

## 2021-06-04 RX ADMIN — TAMSULOSIN HYDROCHLORIDE 0.4 MILLIGRAM(S): 0.4 CAPSULE ORAL at 21:06

## 2021-06-04 RX ADMIN — ENOXAPARIN SODIUM 40 MILLIGRAM(S): 100 INJECTION SUBCUTANEOUS at 11:03

## 2021-06-04 RX ADMIN — LIDOCAINE 1 PATCH: 4 CREAM TOPICAL at 17:52

## 2021-06-04 RX ADMIN — NYSTATIN CREAM 1 APPLICATION(S): 100000 CREAM TOPICAL at 05:22

## 2021-06-04 RX ADMIN — Medication 25 MILLIGRAM(S): at 21:06

## 2021-06-04 RX ADMIN — Medication 50 MILLIGRAM(S): at 21:06

## 2021-06-04 RX ADMIN — Medication 25 MILLIGRAM(S): at 12:23

## 2021-06-04 RX ADMIN — NYSTATIN CREAM 1 APPLICATION(S): 100000 CREAM TOPICAL at 17:51

## 2021-06-04 RX ADMIN — TAMOXIFEN CITRATE 20 MILLIGRAM(S): 20 TABLET, FILM COATED ORAL at 11:04

## 2021-06-04 RX ADMIN — Medication 1 TABLET(S): at 05:24

## 2021-06-04 RX ADMIN — LIDOCAINE 1 PATCH: 4 CREAM TOPICAL at 23:00

## 2021-06-04 RX ADMIN — ESCITALOPRAM OXALATE 10 MILLIGRAM(S): 10 TABLET, FILM COATED ORAL at 11:04

## 2021-06-04 NOTE — DIETITIAN INITIAL EVALUATION ADULT. - PERTINENT MEDS FT
MEDICATIONS  (STANDING):  bethanechol 25 milliGRAM(s) Oral every 8 hours  calcium carbonate 1250 mG  + Vitamin D (OsCal 500 + D) 1 Tablet(s) Oral two times a day  enoxaparin Injectable 40 milliGRAM(s) SubCutaneous daily  escitalopram 10 milliGRAM(s) Oral daily  folic acid 1 milliGRAM(s) Oral daily  levothyroxine 112 MICROGram(s) Oral daily  lidocaine   Patch 1 Patch Transdermal daily  metoprolol tartrate 25 milliGRAM(s) Oral every 12 hours  multivitamin 1 Tablet(s) Oral daily  nystatin Powder 1 Application(s) Topical two times a day  polyethylene glycol 3350 17 Gram(s) Oral daily  senna 2 Tablet(s) Oral at bedtime  tamoxifen 20 milliGRAM(s) Oral daily  tamsulosin 0.4 milliGRAM(s) Oral at bedtime  traZODone 50 milliGRAM(s) Oral at bedtime  zinc oxide 40% Ointment 1 Application(s) Topical two times a day    MEDICATIONS  (PRN):  acetaminophen   Tablet .. 650 milliGRAM(s) Oral every 6 hours PRN Temp greater or equal to 38C (100.4F), Mild Pain (1 - 3)  oxyCODONE    IR 5 milliGRAM(s) Oral every 6 hours PRN Moderate Pain (4 - 6)

## 2021-06-04 NOTE — OCCUPATIONAL THERAPY INITIAL EVALUATION ADULT - ADDITIONAL COMMENTS
As per EMR, pt lives in a private home with her  and son. Pt utilized a RW/SC prior to hospitalization and had a HHA for IADLs. Pt independent in ADLs.

## 2021-06-04 NOTE — DIETITIAN INITIAL EVALUATION ADULT. - OTHER INFO
[facility]    Jayce Blake Patient Status:  Inpatient    1943 MRN [id number]   Location [facility] 3W/SW Attending Charmayne Cranker, MD   Hosp Day # 2 PCP Thai Camilo MD     Subjective:  RO ischemic heart disease     Hypercholesterolemia     Impotence of organic origin     Microscopic hematuria     Nocturia     Nontraumatic rupture of tendon     Pain in joint involving ankle and foot     Polydipsia     Polyuria     Retained orthopedic hardwar urine cx ngtd  # Leukocytosis w/bandemia  # Hypotension  # AARON on CKD  # Afib, elevated troponin  # Pulmonary nodule  # Elevated BNP, BLE edema  # Autoimmune hepatitis (on 9mg budesonide, follows at [facility])     PLAN:  -  Continue meropenem at this time.   - 66y old  Female with HTN, hypothyroidism, hx of breast Ca, hx of mastectomy, transferred from acute rehab [address] [place] [address] due to SVT - rate of 150, associated  with palpitations, lightheaded ness and mild dyspnea. Patient reports tolerating her diet , po intake noted good ~75% . No GI distress noted. No edema, (+) BM (6/3) Patient with history of moderate malnutrition , Mild muscle wasting & fat loss noted. Patient with noted weight loss of >5 % x 1 month (7lb weight change noted ) as per previous month possibly due to prolonged hospital course , Patient reports consuming Po ensure supplements  which is providing 1050 calories , 60gms protein.

## 2021-06-04 NOTE — OCCUPATIONAL THERAPY INITIAL EVALUATION ADULT - BED MOBILITY TRAINING, PT EVAL
Pt will perform supine to sit with supervision in order to prepare for seated ADL tasks in 2-3 OT sessions.

## 2021-06-04 NOTE — OCCUPATIONAL THERAPY INITIAL EVALUATION ADULT - PERTINENT HX OF CURRENT PROBLEM, REHAB EVAL
As per EMR, pt "hx of mastectomy, transferred from acute rehab [address] [place] [address] due to SVT - rate of 150, asso with palpitations, lightheaded ness and mild dyspnea.  Pt was recently hospitalized at [facility] 4/19/21 for s/p fall- she acquired a SAH, left rib fractures 3-8, left iliac wing fracture with hematoma, received 1 uPRBC for acute blood loss anemia 4/22/21, left clavicle fracture, left nasal bone and left orbital rim fractures, sternal fracture, left pinky PIP dislocation- s/p closed reduction."

## 2021-06-04 NOTE — OCCUPATIONAL THERAPY INITIAL EVALUATION ADULT - MANUAL MUSCLE TESTING RESULTS, REHAB EVAL
Right UE grossly 3+/5, left shoulder not challenged, left elbow to digits grossly 3+/5/grossly assessed due to

## 2021-06-04 NOTE — OCCUPATIONAL THERAPY INITIAL EVALUATION ADULT - PRECAUTIONS/LIMITATIONS, REHAB EVAL
no BP L UE, TLSO, NWB L UE as per prior therapy notes. Orders not present in chart, discussed with hospitalist during evaluation./fall precautions

## 2021-06-04 NOTE — DIETITIAN INITIAL EVALUATION ADULT. - PERTINENT LABORATORY DATA
Date: 04 Jun 2021 06:02  Hemoglobin 12.3   Hematocrit 38.2     Date: 06-04  Sodium 144  Potassium 4.5  Glucose Serum 107<H>  BUN 23  Creatinine 0.68

## 2021-06-04 NOTE — OCCUPATIONAL THERAPY INITIAL EVALUATION ADULT - RANGE OF MOTION EXAMINATION, UPPER EXTREMITY
Left UE- shoulder not challenged, elbow to distal WNLs/Right UE Active ROM was WNL (within normal limits)

## 2021-06-04 NOTE — OCCUPATIONAL THERAPY INITIAL EVALUATION ADULT - MD ORDER
MD orders for OT eval received, chart reviewed and contents noted. Consulted with RN prior to session, stated pt OK to participate in OT evaluation. MD in to assess patient during session.

## 2021-06-04 NOTE — DIETITIAN NUTRITION RISK NOTIFICATION - TREATMENT: THE FOLLOWING DIET HAS BEEN RECOMMENDED
Diet, Regular:   Caffeine Free  Kosher  Supplement Feeding Modality:  Oral  Ensure Enlive Cans or Servings Per Day:  1       Frequency:  Three Times a day (06-03-21 @ 00:31) [Active]

## 2021-06-05 LAB
ALBUMIN SERPL ELPH-MCNC: 3.2 G/DL — LOW (ref 3.3–5)
ALP SERPL-CCNC: 142 U/L — HIGH (ref 40–120)
ALT FLD-CCNC: 23 U/L — SIGNIFICANT CHANGE UP (ref 10–45)
ANION GAP SERPL CALC-SCNC: 12 MMOL/L — SIGNIFICANT CHANGE UP (ref 5–17)
AST SERPL-CCNC: 11 U/L — SIGNIFICANT CHANGE UP (ref 10–40)
BILIRUB SERPL-MCNC: 0.3 MG/DL — SIGNIFICANT CHANGE UP (ref 0.2–1.2)
BUN SERPL-MCNC: 29 MG/DL — HIGH (ref 7–23)
CALCIUM SERPL-MCNC: 9.3 MG/DL — SIGNIFICANT CHANGE UP (ref 8.4–10.5)
CHLORIDE SERPL-SCNC: 109 MMOL/L — HIGH (ref 96–108)
CO2 SERPL-SCNC: 24 MMOL/L — SIGNIFICANT CHANGE UP (ref 22–31)
CREAT SERPL-MCNC: 0.81 MG/DL — SIGNIFICANT CHANGE UP (ref 0.5–1.3)
GLUCOSE SERPL-MCNC: 110 MG/DL — HIGH (ref 70–99)
HCT VFR BLD CALC: 40.5 % — SIGNIFICANT CHANGE UP (ref 34.5–45)
HGB BLD-MCNC: 13.3 G/DL — SIGNIFICANT CHANGE UP (ref 11.5–15.5)
MCHC RBC-ENTMCNC: 29.4 PG — SIGNIFICANT CHANGE UP (ref 27–34)
MCHC RBC-ENTMCNC: 32.8 GM/DL — SIGNIFICANT CHANGE UP (ref 32–36)
MCV RBC AUTO: 89.6 FL — SIGNIFICANT CHANGE UP (ref 80–100)
NRBC # BLD: 0 /100 WBCS — SIGNIFICANT CHANGE UP (ref 0–0)
PLATELET # BLD AUTO: 188 K/UL — SIGNIFICANT CHANGE UP (ref 150–400)
POTASSIUM SERPL-MCNC: 4.3 MMOL/L — SIGNIFICANT CHANGE UP (ref 3.5–5.3)
POTASSIUM SERPL-SCNC: 4.3 MMOL/L — SIGNIFICANT CHANGE UP (ref 3.5–5.3)
PROT SERPL-MCNC: 6.7 G/DL — SIGNIFICANT CHANGE UP (ref 6–8.3)
RBC # BLD: 4.52 M/UL — SIGNIFICANT CHANGE UP (ref 3.8–5.2)
RBC # FLD: 13.1 % — SIGNIFICANT CHANGE UP (ref 10.3–14.5)
SODIUM SERPL-SCNC: 145 MMOL/L — SIGNIFICANT CHANGE UP (ref 135–145)
WBC # BLD: 12.84 K/UL — HIGH (ref 3.8–10.5)
WBC # FLD AUTO: 12.84 K/UL — HIGH (ref 3.8–10.5)

## 2021-06-05 PROCEDURE — 93010 ELECTROCARDIOGRAM REPORT: CPT

## 2021-06-05 PROCEDURE — 99233 SBSQ HOSP IP/OBS HIGH 50: CPT

## 2021-06-05 RX ORDER — LANOLIN ALCOHOL/MO/W.PET/CERES
3 CREAM (GRAM) TOPICAL AT BEDTIME
Refills: 0 | Status: DISCONTINUED | OUTPATIENT
Start: 2021-06-05 | End: 2021-06-09

## 2021-06-05 RX ORDER — METOPROLOL TARTRATE 50 MG
5 TABLET ORAL ONCE
Refills: 0 | Status: DISCONTINUED | OUTPATIENT
Start: 2021-06-05 | End: 2021-06-05

## 2021-06-05 RX ADMIN — Medication 1 TABLET(S): at 13:23

## 2021-06-05 RX ADMIN — Medication 1 TABLET(S): at 05:37

## 2021-06-05 RX ADMIN — Medication 112 MICROGRAM(S): at 05:37

## 2021-06-05 RX ADMIN — ESCITALOPRAM OXALATE 10 MILLIGRAM(S): 10 TABLET, FILM COATED ORAL at 13:23

## 2021-06-05 RX ADMIN — NYSTATIN CREAM 1 APPLICATION(S): 100000 CREAM TOPICAL at 18:00

## 2021-06-05 RX ADMIN — NYSTATIN CREAM 1 APPLICATION(S): 100000 CREAM TOPICAL at 05:37

## 2021-06-05 RX ADMIN — Medication 25 MILLIGRAM(S): at 05:37

## 2021-06-05 RX ADMIN — Medication 50 MILLIGRAM(S): at 21:20

## 2021-06-05 RX ADMIN — Medication 3 MILLIGRAM(S): at 21:20

## 2021-06-05 RX ADMIN — SENNA PLUS 2 TABLET(S): 8.6 TABLET ORAL at 21:20

## 2021-06-05 RX ADMIN — LIDOCAINE 1 PATCH: 4 CREAM TOPICAL at 13:25

## 2021-06-05 RX ADMIN — Medication 25 MILLIGRAM(S): at 17:59

## 2021-06-05 RX ADMIN — Medication 25 MILLIGRAM(S): at 16:28

## 2021-06-05 RX ADMIN — ENOXAPARIN SODIUM 40 MILLIGRAM(S): 100 INJECTION SUBCUTANEOUS at 13:23

## 2021-06-05 RX ADMIN — Medication 25 MILLIGRAM(S): at 21:20

## 2021-06-05 RX ADMIN — LIDOCAINE 1 PATCH: 4 CREAM TOPICAL at 19:39

## 2021-06-05 RX ADMIN — Medication 1 TABLET(S): at 17:59

## 2021-06-05 RX ADMIN — POLYETHYLENE GLYCOL 3350 17 GRAM(S): 17 POWDER, FOR SOLUTION ORAL at 16:28

## 2021-06-05 RX ADMIN — ZINC OXIDE 1 APPLICATION(S): 200 OINTMENT TOPICAL at 05:37

## 2021-06-05 RX ADMIN — TAMOXIFEN CITRATE 20 MILLIGRAM(S): 20 TABLET, FILM COATED ORAL at 13:23

## 2021-06-05 RX ADMIN — ZINC OXIDE 1 APPLICATION(S): 200 OINTMENT TOPICAL at 18:00

## 2021-06-05 RX ADMIN — Medication 1 MILLIGRAM(S): at 13:23

## 2021-06-05 NOTE — CHART NOTE - NSCHARTNOTEFT_GEN_A_CORE
Event Note   TELE 0229 W1 ( TELE)    Reason for Notification: Tachycardia    Assessment: Called by RN for HR 170s. On assessment patient says "I don't feel great, I went to the bathroom and need to be changed." She denies chest pain and palpitations. Does report some shortness of breath but cannot recall when it started.     Physical Examination:  GENERAL: NAD  NERVOUS SYSTEM:  Alert, clear speech  CHEST: Clear to ascultation bilaterally, respirations appear unlabored   HEART: Tachycardic, S1S2  ABDOMEN: Soft, nontender, and nondistended    T(C): 37.1 (06-05-21 @ 16:20), Max: 37.1 (06-05-21 @ 10:31)  HR: 87 (06-05-21 @ 10:31) (67 - 87)  BP: 114/79 (06-05-21 @ 16:20) (110/71 - 138/59)  RR: 16 (06-05-21 @ 16:20) (16 - 18)  SpO2: 95% (06-05-21 @ 16:20) (95% - 100%)    Plan:  - EKG STAT --> showed SVT hr 162  - Metoprolol 5mg IVP stat, will also give scheduled PO dose of metoprolol 25mg   - Reassess Event Note   TELE 0229 W1 ( TELE)    Reason for Notification: Tachycardia    Assessment: Called by RN for HR 170s. On assessment patient says "I don't feel great, I went to the bathroom and need to be changed." She denies chest pain and palpitations. Does report some shortness of breath but cannot recall when it started.     Physical Examination:  GENERAL: NAD  NERVOUS SYSTEM:  Alert, clear speech  CHEST: Clear to ascultation bilaterally, respirations appear unlabored   HEART: Tachycardic, S1S2  ABDOMEN: Soft, nontender, and nondistended    T(C): 37.1 (06-05-21 @ 16:20), Max: 37.1 (06-05-21 @ 10:31)  HR: 87 (06-05-21 @ 10:31) (67 - 87)  BP: 114/79 (06-05-21 @ 16:20) (110/71 - 138/59)  RR: 16 (06-05-21 @ 16:20) (16 - 18)  SpO2: 95% (06-05-21 @ 16:20) (95% - 100%)    Plan:  - EKG STAT --> showed SVT hr 162  - Metoprolol 5mg IVP stat, will also give scheduled PO dose of metoprolol 25mg   - Reassess    **Prior to medicating with IV metoprolol, patient spontaneously converted back to SR hr 87 on cardiac monitor, will repeat EKG and continue tele monitoring. Will cancel order to IV metoprolol

## 2021-06-06 LAB
ANION GAP SERPL CALC-SCNC: 13 MMOL/L — SIGNIFICANT CHANGE UP (ref 5–17)
BUN SERPL-MCNC: 32 MG/DL — HIGH (ref 7–23)
CALCIUM SERPL-MCNC: 9.3 MG/DL — SIGNIFICANT CHANGE UP (ref 8.4–10.5)
CHLORIDE SERPL-SCNC: 108 MMOL/L — SIGNIFICANT CHANGE UP (ref 96–108)
CO2 SERPL-SCNC: 22 MMOL/L — SIGNIFICANT CHANGE UP (ref 22–31)
CREAT SERPL-MCNC: 0.78 MG/DL — SIGNIFICANT CHANGE UP (ref 0.5–1.3)
GLUCOSE SERPL-MCNC: 118 MG/DL — HIGH (ref 70–99)
HCT VFR BLD CALC: 42.1 % — SIGNIFICANT CHANGE UP (ref 34.5–45)
HGB BLD-MCNC: 13.7 G/DL — SIGNIFICANT CHANGE UP (ref 11.5–15.5)
MCHC RBC-ENTMCNC: 29.5 PG — SIGNIFICANT CHANGE UP (ref 27–34)
MCHC RBC-ENTMCNC: 32.5 GM/DL — SIGNIFICANT CHANGE UP (ref 32–36)
MCV RBC AUTO: 90.5 FL — SIGNIFICANT CHANGE UP (ref 80–100)
NRBC # BLD: 0 /100 WBCS — SIGNIFICANT CHANGE UP (ref 0–0)
PLATELET # BLD AUTO: 203 K/UL — SIGNIFICANT CHANGE UP (ref 150–400)
POTASSIUM SERPL-MCNC: 4.3 MMOL/L — SIGNIFICANT CHANGE UP (ref 3.5–5.3)
POTASSIUM SERPL-SCNC: 4.3 MMOL/L — SIGNIFICANT CHANGE UP (ref 3.5–5.3)
PROCALCITONIN SERPL-MCNC: 0.1 NG/ML — HIGH
RBC # BLD: 4.65 M/UL — SIGNIFICANT CHANGE UP (ref 3.8–5.2)
RBC # FLD: 13 % — SIGNIFICANT CHANGE UP (ref 10.3–14.5)
SODIUM SERPL-SCNC: 143 MMOL/L — SIGNIFICANT CHANGE UP (ref 135–145)
WBC # BLD: 9.79 K/UL — SIGNIFICANT CHANGE UP (ref 3.8–10.5)
WBC # FLD AUTO: 9.79 K/UL — SIGNIFICANT CHANGE UP (ref 3.8–10.5)

## 2021-06-06 PROCEDURE — 99232 SBSQ HOSP IP/OBS MODERATE 35: CPT

## 2021-06-06 RX ADMIN — Medication 1 TABLET(S): at 05:45

## 2021-06-06 RX ADMIN — Medication 1 TABLET(S): at 13:03

## 2021-06-06 RX ADMIN — ESCITALOPRAM OXALATE 10 MILLIGRAM(S): 10 TABLET, FILM COATED ORAL at 11:18

## 2021-06-06 RX ADMIN — LIDOCAINE 1 PATCH: 4 CREAM TOPICAL at 19:37

## 2021-06-06 RX ADMIN — SENNA PLUS 2 TABLET(S): 8.6 TABLET ORAL at 22:24

## 2021-06-06 RX ADMIN — NYSTATIN CREAM 1 APPLICATION(S): 100000 CREAM TOPICAL at 05:45

## 2021-06-06 RX ADMIN — Medication 25 MILLIGRAM(S): at 13:03

## 2021-06-06 RX ADMIN — Medication 50 MILLIGRAM(S): at 22:24

## 2021-06-06 RX ADMIN — LIDOCAINE 1 PATCH: 4 CREAM TOPICAL at 22:25

## 2021-06-06 RX ADMIN — LIDOCAINE 1 PATCH: 4 CREAM TOPICAL at 01:03

## 2021-06-06 RX ADMIN — Medication 1 MILLIGRAM(S): at 13:03

## 2021-06-06 RX ADMIN — LIDOCAINE 1 PATCH: 4 CREAM TOPICAL at 11:19

## 2021-06-06 RX ADMIN — ZINC OXIDE 1 APPLICATION(S): 200 OINTMENT TOPICAL at 17:30

## 2021-06-06 RX ADMIN — TAMOXIFEN CITRATE 20 MILLIGRAM(S): 20 TABLET, FILM COATED ORAL at 11:19

## 2021-06-06 RX ADMIN — Medication 112 MICROGRAM(S): at 05:44

## 2021-06-06 RX ADMIN — Medication 25 MILLIGRAM(S): at 05:45

## 2021-06-06 RX ADMIN — Medication 25 MILLIGRAM(S): at 17:29

## 2021-06-06 RX ADMIN — ZINC OXIDE 1 APPLICATION(S): 200 OINTMENT TOPICAL at 05:47

## 2021-06-06 RX ADMIN — Medication 3 MILLIGRAM(S): at 22:24

## 2021-06-06 RX ADMIN — ENOXAPARIN SODIUM 40 MILLIGRAM(S): 100 INJECTION SUBCUTANEOUS at 11:19

## 2021-06-06 RX ADMIN — Medication 25 MILLIGRAM(S): at 22:24

## 2021-06-06 RX ADMIN — NYSTATIN CREAM 1 APPLICATION(S): 100000 CREAM TOPICAL at 17:29

## 2021-06-06 RX ADMIN — Medication 1 TABLET(S): at 17:29

## 2021-06-07 LAB
ANION GAP SERPL CALC-SCNC: 10 MMOL/L — SIGNIFICANT CHANGE UP (ref 5–17)
BUN SERPL-MCNC: 27 MG/DL — HIGH (ref 7–23)
CALCIUM SERPL-MCNC: 9.4 MG/DL — SIGNIFICANT CHANGE UP (ref 8.4–10.5)
CHLORIDE SERPL-SCNC: 110 MMOL/L — HIGH (ref 96–108)
CO2 SERPL-SCNC: 25 MMOL/L — SIGNIFICANT CHANGE UP (ref 22–31)
CREAT SERPL-MCNC: 0.74 MG/DL — SIGNIFICANT CHANGE UP (ref 0.5–1.3)
GLUCOSE SERPL-MCNC: 130 MG/DL — HIGH (ref 70–99)
HCT VFR BLD CALC: 42.2 % — SIGNIFICANT CHANGE UP (ref 34.5–45)
HGB BLD-MCNC: 13.6 G/DL — SIGNIFICANT CHANGE UP (ref 11.5–15.5)
MCHC RBC-ENTMCNC: 29.4 PG — SIGNIFICANT CHANGE UP (ref 27–34)
MCHC RBC-ENTMCNC: 32.2 GM/DL — SIGNIFICANT CHANGE UP (ref 32–36)
MCV RBC AUTO: 91.3 FL — SIGNIFICANT CHANGE UP (ref 80–100)
NRBC # BLD: 0 /100 WBCS — SIGNIFICANT CHANGE UP (ref 0–0)
PLATELET # BLD AUTO: 203 K/UL — SIGNIFICANT CHANGE UP (ref 150–400)
POTASSIUM SERPL-MCNC: 4.2 MMOL/L — SIGNIFICANT CHANGE UP (ref 3.5–5.3)
POTASSIUM SERPL-SCNC: 4.2 MMOL/L — SIGNIFICANT CHANGE UP (ref 3.5–5.3)
RBC # BLD: 4.62 M/UL — SIGNIFICANT CHANGE UP (ref 3.8–5.2)
RBC # FLD: 12.9 % — SIGNIFICANT CHANGE UP (ref 10.3–14.5)
SODIUM SERPL-SCNC: 145 MMOL/L — SIGNIFICANT CHANGE UP (ref 135–145)
WBC # BLD: 9.16 K/UL — SIGNIFICANT CHANGE UP (ref 3.8–10.5)
WBC # FLD AUTO: 9.16 K/UL — SIGNIFICANT CHANGE UP (ref 3.8–10.5)

## 2021-06-07 PROCEDURE — 99232 SBSQ HOSP IP/OBS MODERATE 35: CPT

## 2021-06-07 RX ADMIN — ZINC OXIDE 1 APPLICATION(S): 200 OINTMENT TOPICAL at 17:13

## 2021-06-07 RX ADMIN — Medication 25 MILLIGRAM(S): at 12:52

## 2021-06-07 RX ADMIN — Medication 25 MILLIGRAM(S): at 17:12

## 2021-06-07 RX ADMIN — Medication 25 MILLIGRAM(S): at 05:36

## 2021-06-07 RX ADMIN — TAMOXIFEN CITRATE 20 MILLIGRAM(S): 20 TABLET, FILM COATED ORAL at 12:51

## 2021-06-07 RX ADMIN — Medication 3 MILLIGRAM(S): at 21:24

## 2021-06-07 RX ADMIN — Medication 1 MILLIGRAM(S): at 12:52

## 2021-06-07 RX ADMIN — ZINC OXIDE 1 APPLICATION(S): 200 OINTMENT TOPICAL at 05:37

## 2021-06-07 RX ADMIN — Medication 112 MICROGRAM(S): at 05:36

## 2021-06-07 RX ADMIN — Medication 1 TABLET(S): at 05:36

## 2021-06-07 RX ADMIN — LIDOCAINE 1 PATCH: 4 CREAM TOPICAL at 19:18

## 2021-06-07 RX ADMIN — ESCITALOPRAM OXALATE 10 MILLIGRAM(S): 10 TABLET, FILM COATED ORAL at 12:52

## 2021-06-07 RX ADMIN — Medication 50 MILLIGRAM(S): at 21:24

## 2021-06-07 RX ADMIN — LIDOCAINE 1 PATCH: 4 CREAM TOPICAL at 23:35

## 2021-06-07 RX ADMIN — Medication 25 MILLIGRAM(S): at 21:24

## 2021-06-07 RX ADMIN — NYSTATIN CREAM 1 APPLICATION(S): 100000 CREAM TOPICAL at 17:13

## 2021-06-07 RX ADMIN — LIDOCAINE 1 PATCH: 4 CREAM TOPICAL at 11:36

## 2021-06-07 RX ADMIN — Medication 1 TABLET(S): at 17:12

## 2021-06-07 RX ADMIN — ENOXAPARIN SODIUM 40 MILLIGRAM(S): 100 INJECTION SUBCUTANEOUS at 12:52

## 2021-06-07 RX ADMIN — Medication 1 TABLET(S): at 12:52

## 2021-06-07 RX ADMIN — NYSTATIN CREAM 1 APPLICATION(S): 100000 CREAM TOPICAL at 05:36

## 2021-06-07 RX ADMIN — SENNA PLUS 2 TABLET(S): 8.6 TABLET ORAL at 21:24

## 2021-06-07 NOTE — PROGRESS NOTE ADULT - ATTENDING COMMENTS
67 y/o F with HTN, hypothyroidism, hx of breast Ca, hx of mastectomy, transferred from acute rehab due to SVT after holding metoprolol. BB resumed, hold for SBP <90, HR < 60. well tolerated. medically optimized for dispo planning. pending PT/PMR reccs.

## 2021-06-08 LAB
ANION GAP SERPL CALC-SCNC: 11 MMOL/L — SIGNIFICANT CHANGE UP (ref 5–17)
BUN SERPL-MCNC: 34 MG/DL — HIGH (ref 7–23)
CALCIUM SERPL-MCNC: 9.1 MG/DL — SIGNIFICANT CHANGE UP (ref 8.4–10.5)
CHLORIDE SERPL-SCNC: 110 MMOL/L — HIGH (ref 96–108)
CO2 SERPL-SCNC: 24 MMOL/L — SIGNIFICANT CHANGE UP (ref 22–31)
CREAT SERPL-MCNC: 0.61 MG/DL — SIGNIFICANT CHANGE UP (ref 0.5–1.3)
GLUCOSE SERPL-MCNC: 122 MG/DL — HIGH (ref 70–99)
HCT VFR BLD CALC: 40.5 % — SIGNIFICANT CHANGE UP (ref 34.5–45)
HGB BLD-MCNC: 13.1 G/DL — SIGNIFICANT CHANGE UP (ref 11.5–15.5)
MCHC RBC-ENTMCNC: 28.9 PG — SIGNIFICANT CHANGE UP (ref 27–34)
MCHC RBC-ENTMCNC: 32.3 GM/DL — SIGNIFICANT CHANGE UP (ref 32–36)
MCV RBC AUTO: 89.4 FL — SIGNIFICANT CHANGE UP (ref 80–100)
NRBC # BLD: 0 /100 WBCS — SIGNIFICANT CHANGE UP (ref 0–0)
PLATELET # BLD AUTO: 196 K/UL — SIGNIFICANT CHANGE UP (ref 150–400)
POTASSIUM SERPL-MCNC: 4.4 MMOL/L — SIGNIFICANT CHANGE UP (ref 3.5–5.3)
POTASSIUM SERPL-SCNC: 4.4 MMOL/L — SIGNIFICANT CHANGE UP (ref 3.5–5.3)
RBC # BLD: 4.53 M/UL — SIGNIFICANT CHANGE UP (ref 3.8–5.2)
RBC # FLD: 12.8 % — SIGNIFICANT CHANGE UP (ref 10.3–14.5)
SODIUM SERPL-SCNC: 145 MMOL/L — SIGNIFICANT CHANGE UP (ref 135–145)
WBC # BLD: 11.75 K/UL — HIGH (ref 3.8–10.5)
WBC # FLD AUTO: 11.75 K/UL — HIGH (ref 3.8–10.5)

## 2021-06-08 PROCEDURE — 99232 SBSQ HOSP IP/OBS MODERATE 35: CPT

## 2021-06-08 RX ADMIN — NYSTATIN CREAM 1 APPLICATION(S): 100000 CREAM TOPICAL at 17:08

## 2021-06-08 RX ADMIN — Medication 25 MILLIGRAM(S): at 05:40

## 2021-06-08 RX ADMIN — SENNA PLUS 2 TABLET(S): 8.6 TABLET ORAL at 21:05

## 2021-06-08 RX ADMIN — TAMOXIFEN CITRATE 20 MILLIGRAM(S): 20 TABLET, FILM COATED ORAL at 11:01

## 2021-06-08 RX ADMIN — Medication 3 MILLIGRAM(S): at 21:06

## 2021-06-08 RX ADMIN — Medication 50 MILLIGRAM(S): at 21:05

## 2021-06-08 RX ADMIN — Medication 1 TABLET(S): at 05:40

## 2021-06-08 RX ADMIN — Medication 25 MILLIGRAM(S): at 17:07

## 2021-06-08 RX ADMIN — NYSTATIN CREAM 1 APPLICATION(S): 100000 CREAM TOPICAL at 05:40

## 2021-06-08 RX ADMIN — LIDOCAINE 1 PATCH: 4 CREAM TOPICAL at 23:27

## 2021-06-08 RX ADMIN — Medication 1 TABLET(S): at 17:07

## 2021-06-08 RX ADMIN — Medication 112 MICROGRAM(S): at 05:40

## 2021-06-08 RX ADMIN — Medication 1 MILLIGRAM(S): at 11:01

## 2021-06-08 RX ADMIN — ZINC OXIDE 1 APPLICATION(S): 200 OINTMENT TOPICAL at 05:40

## 2021-06-08 RX ADMIN — Medication 1 TABLET(S): at 11:01

## 2021-06-08 RX ADMIN — ESCITALOPRAM OXALATE 10 MILLIGRAM(S): 10 TABLET, FILM COATED ORAL at 11:01

## 2021-06-08 RX ADMIN — LIDOCAINE 1 PATCH: 4 CREAM TOPICAL at 11:01

## 2021-06-08 RX ADMIN — Medication 25 MILLIGRAM(S): at 21:05

## 2021-06-08 RX ADMIN — ZINC OXIDE 1 APPLICATION(S): 200 OINTMENT TOPICAL at 17:08

## 2021-06-08 RX ADMIN — Medication 25 MILLIGRAM(S): at 13:02

## 2021-06-08 RX ADMIN — POLYETHYLENE GLYCOL 3350 17 GRAM(S): 17 POWDER, FOR SOLUTION ORAL at 11:01

## 2021-06-08 RX ADMIN — ENOXAPARIN SODIUM 40 MILLIGRAM(S): 100 INJECTION SUBCUTANEOUS at 11:01

## 2021-06-08 RX ADMIN — LIDOCAINE 1 PATCH: 4 CREAM TOPICAL at 19:50

## 2021-06-08 NOTE — PROGRESS NOTE ADULT - ATTENDING COMMENTS
67 y/o F with HTN, hypothyroidism, hx of breast Ca, hx of mastectomy, transferred from acute rehab due to SVT after holding metoprolol. BB resumed, hold for SBP <90, HR < 60. well tolerated. medically optimized for dispo planning. pending PT/PMR reccs. 67 y/o F with HTN, hypothyroidism, hx of breast Ca, hx of mastectomy, transferred from acute rehab due to SVT after holding metoprolol. BB resumed, hold for SBP <90, HR < 60. well tolerated. medically optimized for dispo planning. pending PT/PMR reccs.    Patient to continue with Tamoxifen follow up with heme/onc outpatient to determine need for further treatment as indicated.

## 2021-06-08 NOTE — CHART NOTE - NSCHARTNOTEFT_GEN_A_CORE
Nutrition Follow Up Note  Hospital Course (Per Electronic Medical Record):   Source: Medical Record [X] Patient [X]  Nursing Staff [X]     Diet: Regular , Kosher ,Ensure Enlive TID    Patient noted consuming 75% of meals as per flow sheet , (+) moderate malnutrition noted, encouraged patient to consume po supplements , patient requires redirection in conversion.     Current Weight: (6/8) 126.7/57.5kg , ~2 lb weight loss since admission     Pertinent Medications: MEDICATIONS  (STANDING):  bethanechol 25 milliGRAM(s) Oral every 8 hours  calcium carbonate 1250 mG  + Vitamin D (OsCal 500 + D) 1 Tablet(s) Oral two times a day  enoxaparin Injectable 40 milliGRAM(s) SubCutaneous daily  escitalopram 10 milliGRAM(s) Oral daily  folic acid 1 milliGRAM(s) Oral daily  levothyroxine 112 MICROGram(s) Oral daily  lidocaine   Patch 1 Patch Transdermal daily  melatonin 3 milliGRAM(s) Oral at bedtime  metoprolol tartrate 25 milliGRAM(s) Oral two times a day  multivitamin 1 Tablet(s) Oral daily  nystatin Powder 1 Application(s) Topical two times a day  polyethylene glycol 3350 17 Gram(s) Oral daily  senna 2 Tablet(s) Oral at bedtime  tamoxifen 20 milliGRAM(s) Oral daily  traZODone 50 milliGRAM(s) Oral at bedtime  zinc oxide 40% Ointment 1 Application(s) Topical two times a day    MEDICATIONS  (PRN):  acetaminophen   Tablet .. 650 milliGRAM(s) Oral every 6 hours PRN Temp greater or equal to 38C (100.4F), Mild Pain (1 - 3)  oxyCODONE    IR 5 milliGRAM(s) Oral every 6 hours PRN Moderate Pain (4 - 6)      Pertinent Labs:  06-08 Na145 mmol/L Glu 122 mg/dL<H> K+ 4.4 mmol/L Cr  0.61 mg/dL BUN 34 mg/dL<H> 06-05 Alb 3.2 g/dL<L>        Skin: intact     Edema: none    Last BM: (6/5)    Estimated Needs:   [X] No Change since Previous Assessment    Previous Nutrition Diagnosis: Moderate Malnutrition     Nutrition Diagnosis is [X] Ongoing       New Nutrition Diagnosis: [X] Not Applicable    Interventions:   1. Recommend continue current diet       Monitoring & Evaluation: will monitor:  [X] Weights   [X] PO Intake   [X] Follow Up (Per Protocol)  [X] Tolerance to Diet Prescription       RD to follow as per Nutrition protocol  Rae Mckeon RDN

## 2021-06-09 ENCOUNTER — TRANSCRIPTION ENCOUNTER (OUTPATIENT)
Age: 66
End: 2021-06-09

## 2021-06-09 VITALS
TEMPERATURE: 99 F | OXYGEN SATURATION: 96 % | DIASTOLIC BLOOD PRESSURE: 66 MMHG | SYSTOLIC BLOOD PRESSURE: 114 MMHG | RESPIRATION RATE: 16 BRPM | HEART RATE: 86 BPM

## 2021-06-09 LAB — SARS-COV-2 RNA SPEC QL NAA+PROBE: SIGNIFICANT CHANGE UP

## 2021-06-09 PROCEDURE — 99239 HOSP IP/OBS DSCHRG MGMT >30: CPT

## 2021-06-09 PROCEDURE — 84484 ASSAY OF TROPONIN QUANT: CPT

## 2021-06-09 PROCEDURE — 84145 PROCALCITONIN (PCT): CPT

## 2021-06-09 PROCEDURE — 36415 COLL VENOUS BLD VENIPUNCTURE: CPT

## 2021-06-09 PROCEDURE — 80048 BASIC METABOLIC PNL TOTAL CA: CPT

## 2021-06-09 PROCEDURE — 81001 URINALYSIS AUTO W/SCOPE: CPT

## 2021-06-09 PROCEDURE — 83735 ASSAY OF MAGNESIUM: CPT

## 2021-06-09 PROCEDURE — 86769 SARS-COV-2 COVID-19 ANTIBODY: CPT

## 2021-06-09 PROCEDURE — 97162 PT EVAL MOD COMPLEX 30 MIN: CPT

## 2021-06-09 PROCEDURE — 93005 ELECTROCARDIOGRAM TRACING: CPT

## 2021-06-09 PROCEDURE — 85025 COMPLETE CBC W/AUTO DIFF WBC: CPT

## 2021-06-09 PROCEDURE — 97535 SELF CARE MNGMENT TRAINING: CPT

## 2021-06-09 PROCEDURE — 84443 ASSAY THYROID STIM HORMONE: CPT

## 2021-06-09 PROCEDURE — 87635 SARS-COV-2 COVID-19 AMP PRB: CPT

## 2021-06-09 PROCEDURE — 80053 COMPREHEN METABOLIC PANEL: CPT

## 2021-06-09 PROCEDURE — 85027 COMPLETE CBC AUTOMATED: CPT

## 2021-06-09 PROCEDURE — 97166 OT EVAL MOD COMPLEX 45 MIN: CPT

## 2021-06-09 PROCEDURE — 97110 THERAPEUTIC EXERCISES: CPT

## 2021-06-09 PROCEDURE — 97116 GAIT TRAINING THERAPY: CPT

## 2021-06-09 RX ORDER — METOPROLOL TARTRATE 50 MG
1 TABLET ORAL
Qty: 0 | Refills: 0 | DISCHARGE
Start: 2021-06-09

## 2021-06-09 RX ADMIN — Medication 112 MICROGRAM(S): at 06:02

## 2021-06-09 RX ADMIN — ZINC OXIDE 1 APPLICATION(S): 200 OINTMENT TOPICAL at 06:02

## 2021-06-09 RX ADMIN — Medication 1 MILLIGRAM(S): at 14:31

## 2021-06-09 RX ADMIN — Medication 25 MILLIGRAM(S): at 06:03

## 2021-06-09 RX ADMIN — Medication 1 TABLET(S): at 14:31

## 2021-06-09 RX ADMIN — TAMOXIFEN CITRATE 20 MILLIGRAM(S): 20 TABLET, FILM COATED ORAL at 14:31

## 2021-06-09 RX ADMIN — NYSTATIN CREAM 1 APPLICATION(S): 100000 CREAM TOPICAL at 17:37

## 2021-06-09 RX ADMIN — POLYETHYLENE GLYCOL 3350 17 GRAM(S): 17 POWDER, FOR SOLUTION ORAL at 14:30

## 2021-06-09 RX ADMIN — ZINC OXIDE 1 APPLICATION(S): 200 OINTMENT TOPICAL at 17:38

## 2021-06-09 RX ADMIN — Medication 1 TABLET(S): at 06:02

## 2021-06-09 RX ADMIN — Medication 25 MILLIGRAM(S): at 17:38

## 2021-06-09 RX ADMIN — Medication 25 MILLIGRAM(S): at 14:31

## 2021-06-09 RX ADMIN — ESCITALOPRAM OXALATE 10 MILLIGRAM(S): 10 TABLET, FILM COATED ORAL at 14:31

## 2021-06-09 RX ADMIN — Medication 1 TABLET(S): at 17:38

## 2021-06-09 RX ADMIN — NYSTATIN CREAM 1 APPLICATION(S): 100000 CREAM TOPICAL at 06:03

## 2021-06-09 RX ADMIN — ENOXAPARIN SODIUM 40 MILLIGRAM(S): 100 INJECTION SUBCUTANEOUS at 14:30

## 2021-06-09 NOTE — PROGRESS NOTE ADULT - PROVIDER SPECIALTY LIST ADULT
Cardiology
Cardiology
Hospitalist
Plastic Surgery
Cardiology
Hospitalist

## 2021-06-09 NOTE — DISCHARGE NOTE NURSING/CASE MANAGEMENT/SOCIAL WORK - PATIENT PORTAL LINK FT
You can access the FollowMyHealth Patient Portal offered by [facility] by registering at the following website: [email or web address]. By joining [facility] FollowMyHealth portal, you will also be able to view your health information using other applications (apps) compatible with our system.

## 2021-06-09 NOTE — PROGRESS NOTE ADULT - NUTRITIONAL ASSESSMENT
This patient has been assessed with a concern for Malnutrition and has been determined to have a diagnosis/diagnoses of Moderate protein-calorie malnutrition.    This patient is being managed with:   Diet Regular-  Caffeine Free  Kosher  Supplement Feeding Modality:  Oral  Ensure Enlive Cans or Servings Per Day:  1       Frequency:  Three Times a day  Entered: Adiel  3 2021 12:30AM    

## 2021-06-09 NOTE — PROGRESS NOTE ADULT - ASSESSMENT
Forehead scars s/p repair of laceration 5/17  -sunscreen over scars when outdoors
Imp    patient with svt . no events in last 48 hours    continue bblocker
65 y/o F with HTN, hypothyroidism, hx of breast Ca, hx of mastectomy, transferred from acute rehab 2 [place] due to SVT - rate of 150, asso with palpitations, lightheaded ness and mild dyspnea.  Pt was recently hospitalized at [facility] 4/19/21 for s/p fall- she acquired a SAH, left rib fractures 3-8, left iliac wing fracture with hematoma, received 1 uPRBC for acute blood loss anemia 4/22/21, left clavicle fracture, left nasal bone and left orbital rim fractures, sternal fracture, left pinky PIP dislocation- s/p closed reduction. Previously in rehab, where metoprolol was held based on parameters, though now transferred to Telemetry for management of SVT, now improved.     SVT - resolved   -Trop neg x 2  -Continue tele monitoring. transient episode 6/5, self resolved. SR HR 65-71s overnight  -Continue metoprolol tartrate 25 mg every 12 hours, hold for SBP < 90, HR < 60  -Cardio appreciated   -PT/OT as tolerated    Leukocytosis - improving  -Continue to monitor     Hypothyroidism  -Levothyroxine    Hx fo breast CA   - Folic Acid, tamoxifen   - Analgesics prn    DVT ppx - lovenox    Medically optimized for dispo planning   Son Henok  updated 6/6
66 F with htn, hypothyroid, breast ca with mastectomy, transferred from [facility] with 's with associated palpitations, lightheadedness, mild dyspnea. Pt was recently hospitalized at [facility] 4/19/21 s/p fall found with SAH, left rib fractures 3-8, left iliac wing fracture with hematoma, received one unit PRBCs, left clavicle fracture, left nasal bone and left orbital rim fractures, sternal fracture, left pinky PIP dislocation - s/p closed reduction.    # SVT - resolved  pt transferred from U with SVT, symptomatic  now sinus, troponin negative x2  cards consult appreciated  continue metoprolol (with parameters) unless symptomatic, otherwise use CCB or digoxin  PT/OT/PMR evaluations appreciated - Denied acute rehab    # Leukocytosis - resolved  no signs of acute infection    # Hypothyroid  chronic condition  continue levothyroxine    # H/O Breast Cancer  s/p mastectomy  continue folic acid, tamoxifen, analgesia prn    # Moderate Protein Calorie Malnutrition  nutritional assessment appreciated  pt is being managed with regular diet, caffeine free, kosher with ensure enlive supplementation TID    # Prophylactic Measure  lovenox    # DispoSAR-- not accepted to [facility]. Accepted to [facility]     son katie [phone] called, no answer, left  with callback number  
65 y/o F with HTN, hypothyroidism, hx of breast Ca, hx of mastectomy, transferred from acute rehab 2 [place] due to SVT - rate of 150, asso with palpitations, lightheaded ness and mild dyspnea.  Pt was recently hospitalized at [facility] 4/19/21 for s/p fall- she acquired a SAH, left rib fractures 3-8, left iliac wing fracture with hematoma, received 1 uPRBC for acute blood loss anemia 4/22/21, left clavicle fracture, left nasal bone and left orbital rim fractures, sternal fracture, left pinky PIP dislocation- s/p closed reduction. Previously in rehab, where metoprolol was held based on parameters, though now transferred to Telemetry for management of SVT, now improved.     SVT - resolved   -Trop neg x 2  -Continue tele monitoring. SR HR 64-70s overnight  -Continue metoprolol tartrate 25 mg every 12 hours, hold for SBP < 90, HR < 60  -Cardio appreciated   -PT/OT as tolerated    Leukocytosis  -WBC 12.84, afebrile, no s/s infection   -Continue to monitor     Hypothyroidism  -Levothyroxine    Hx fo breast CA   - Folic Acid, tamoxifen   - Analgesics prn    DVT ppx - lovenox    Medically optimized for dispo planning 
66 female with htn, hypothyroid, breast ca with mastectomy, transferred from [facility] with 's with associated palpitations, lightheadedness, mild dyspnea. Pt was recently hospitalized at [facility] 4/19/21 s/p fall found with SAH, left rib fractures 3-8, left iliac wing fracture with hematoma, received one unit PRBCs, left clavicle fracture, left nasal bone and left orbital rim fractures, sternal fracture, left pinky PIP dislocation - s/p closed reduction.    # SVT - resolved  pt transferred from U with SVT, symptomatic  now sinus, troponin negative x2  cards consult appreciated  continue metoprolol (with parameters) unless symptomatic, otherwise use CCB or digoxin  PT/OT/PMR evaluations appreciated - Denied acute rehab    # Leukocytosis - resolved  no signs of acute infection    # Hypothyroid  chronic condition  continue levothyroxine    # H/O Breast Cancer  s/p mastectomy  continue folic acid, tamoxifen, analgesia prn    # Moderate Protein Calorie Malnutrition  nutritional assessment appreciated  pt is being managed with regular diet, caffeine free, kosher with ensure enlive supplementation TID    # Prophylactic Measure  lovenox    # Dispo  JULI MT planning    son katie [phone] called, no answer, left vm with callback number  
66 female with htn, hypothyroid, breast ca with mastectomy, transferred from [facility] with 's with associated palpitations, lightheadedness, mild dyspnea. Pt was recently hospitalized at [facility] 4/19/21 s/p fall found with SAH, left rib fractures 3-8, left iliac wing fracture with hematoma, received one unit PRBCs, left clavicle fracture, left nasal bone and left orbital rim fractures, sternal fracture, left pinky PIP dislocation - s/p closed reduction.    # SVT - resolved  pt transferred from U with SVT, symptomatic  now sinus, troponin negative x2  cards consult appreciated  continue metoprolol (with parameters) unless symptomatic, otherwise use CCB or digoxin  PT/OT/PMR evaluations pending    # Leukocytosis - resolved  no signs of acute infection    # Hypothyroid  chronic condition  continue levothyroxine    # H/O Breast Cancer  s/p mastectomy  continue folic acid, tamoxifen, analgesia prn    # Moderate Protein Calorie Malnutrition  nutritional assessment appreciated  pt is being managed with regular diet, caffeine free, kosher with ensure enlive supplementation TID    # Prophylactic Measure  lovenox    # Dispo  medically optimized for dispo planning    son katie [phone] called, no answer, left vm with callback number  
SVT now in sinus    would give b blocker unless symptomatic low bp or bradycardia less than 60  otherwise, use calcium channel blocker or digoxin    please repeat ekg

## 2021-06-09 NOTE — DISCHARGE NOTE PROVIDER - NSDCMRMEDTOKEN_GEN_ALL_CORE_FT
acetaminophen 325 mg oral tablet: 2 tab(s) orally every 6 hours, As needed, Temp greater or equal to 38C (100.4F), Mild Pain (1 - 3)  bethanechol 25 mg oral tablet: 1 tab(s) orally every 8 hours  Calcium 500+D oral tablet, chewable: 1 tab(s) orally 2 times a day  enoxaparin: 40 milligram(s) subcutaneous every 24 hours for 6 weeks total  escitalopram 10 mg oral tablet: 1 tab(s) orally once a day  folic acid 1 mg oral tablet: 1 tab(s) orally once a day  levothyroxine 112 mcg (0.112 mg) oral tablet: 1 tab(s) orally once a day  lidocaine 5% topical film:  topically   metoprolol succinate 25 mg oral tablet, extended release: 1 tab(s) orally once a day  Multiple Vitamins oral tablet: 1 tab(s) orally once a day  nystatin 100,000 units/g topical powder: 1 application topically 2 times a day  oxyCODONE 5 mg oral tablet: 1 tab(s) orally every 6 hours, As Needed -Moderate Pain (4 - 6) MDD:4 tabs  polyethylene glycol 3350 oral powder for reconstitution: 17 gram(s) orally once a day  senna oral tablet: 2 tab(s) orally once a day (at bedtime)  tamoxifen 20 mg oral tablet: 1 tab(s) orally once a day  tamsulosin 0.4 mg oral capsule: 1 cap(s) orally once a day (at bedtime)  traZODone 50 mg oral tablet: 1 tab(s) orally once a day (at bedtime)  zinc oxide 40% topical ointment: 1 application topically 2 times a day   acetaminophen 325 mg oral tablet: 2 tab(s) orally every 6 hours, As needed, Temp greater or equal to 38C (100.4F), Mild Pain (1 - 3)  bethanechol 25 mg oral tablet: 1 tab(s) orally every 8 hours  Calcium 500+D oral tablet, chewable: 1 tab(s) orally 2 times a day  enoxaparin: 40 milligram(s) subcutaneous every 24 hours for 6 weeks total  escitalopram 10 mg oral tablet: 1 tab(s) orally once a day  folic acid 1 mg oral tablet: 1 tab(s) orally once a day  levothyroxine 112 mcg (0.112 mg) oral tablet: 1 tab(s) orally once a day  lidocaine 5% topical film:  topically   Metoprolol Tartrate 25 mg oral tablet: 1 tab(s) orally 2 times a day  Hold if SBP &lt; 90 or HR &lt; 60   Multiple Vitamins oral tablet: 1 tab(s) orally once a day  nystatin 100,000 units/g topical powder: 1 application topically 2 times a day  oxyCODONE 5 mg oral tablet: 1 tab(s) orally every 6 hours, As Needed -Moderate Pain (4 - 6) MDD:4 tabs  polyethylene glycol 3350 oral powder for reconstitution: 17 gram(s) orally once a day  senna oral tablet: 2 tab(s) orally once a day (at bedtime)  tamoxifen 20 mg oral tablet: 1 tab(s) orally once a day  traZODone 50 mg oral tablet: 1 tab(s) orally once a day (at bedtime)  zinc oxide 40% topical ointment: 1 application topically 2 times a day

## 2021-06-09 NOTE — PROGRESS NOTE ADULT - REASON FOR ADMISSION
SVT, lightheaded, dyspnea

## 2021-06-09 NOTE — DISCHARGE NOTE PROVIDER - DETAILS OF MALNUTRITION DIAGNOSIS/DIAGNOSES
This patient has been assessed with a concern for Malnutrition and was treated during this hospitalization for the following Nutrition diagnosis/diagnoses:     -  06/04/2021: Moderate protein-calorie malnutrition

## 2021-06-09 NOTE — PROGRESS NOTE ADULT - SUBJECTIVE AND OBJECTIVE BOX
Follow up for   SVT    SUBJ:  comfortable in chair, no c/p, sob, palpitations    PMH  Hypothyroidism    Hyperthyroidism    Breast cancer        MEDICATIONS  (STANDING):  bethanechol 25 milliGRAM(s) Oral every 8 hours  calcium carbonate 1250 mG  + Vitamin D (OsCal 500 + D) 1 Tablet(s) Oral two times a day  enoxaparin Injectable 40 milliGRAM(s) SubCutaneous daily  escitalopram 10 milliGRAM(s) Oral daily  folic acid 1 milliGRAM(s) Oral daily  levothyroxine 112 MICROGram(s) Oral daily  lidocaine   Patch 1 Patch Transdermal daily  metoprolol tartrate 25 milliGRAM(s) Oral every 12 hours  multivitamin 1 Tablet(s) Oral daily  nystatin Powder 1 Application(s) Topical two times a day  polyethylene glycol 3350 17 Gram(s) Oral daily  senna 2 Tablet(s) Oral at bedtime  tamoxifen 20 milliGRAM(s) Oral daily  tamsulosin 0.4 milliGRAM(s) Oral at bedtime  traZODone 50 milliGRAM(s) Oral at bedtime  zinc oxide 40% Ointment 1 Application(s) Topical two times a day    MEDICATIONS  (PRN):  acetaminophen   Tablet .. 650 milliGRAM(s) Oral every 6 hours PRN Temp greater or equal to 38C (100.4F), Mild Pain (1 - 3)  oxyCODONE    IR 5 milliGRAM(s) Oral every 6 hours PRN Moderate Pain (4 - 6)        PHYSICAL EXAM:  Vital Signs Last 24 Hrs  T(C): 36.7 (04 Jun 2021 08:00), Max: 37.2 (03 Jun 2021 19:33)  T(F): 98.1 (04 Jun 2021 08:00), Max: 98.9 (03 Jun 2021 19:33)  HR: 69 (04 Jun 2021 08:00) (64 - 86)  BP: 95/57 (04 Jun 2021 08:39) (91/57 - 118/50)  BP(mean): --  RR: 11 (04 Jun 2021 08:00) (11 - 17)  SpO2: 94% (04 Jun 2021 08:00) (94% - 99%)    GENERAL: NAD, well-groomed, well-developed  EYES:  conjunctiva and sclera clear  ENT: Moist mucous membranes,  NECK: Supple, No JVD, no bruits  CHEST/LUNG: Clear to auscultation bilaterally; No rales, rhonchi, wheezing, or rubs  HEART: Regular rate and rhythm; No murmurs, rubs, or gallops PMI non displaced.  ABDOMEN: Soft, Nontender, Nondistended; Bowel sounds present  EXTREMITIES:   No clubbing, cyanosis, or edema  SKIN: No rashes or lesions  NERVOUS SYSTEM:  Alert       TELEMETRY:  sinus ( prior svt)    LABS:                        12.3   11.70 )-----------( 189      ( 04 Jun 2021 06:02 )             38.2     06-04    144  |  109<H>  |  23  ----------------------------<  107<H>  4.5   |  26  |  0.68    Ca    9.0      04 Jun 2021 06:02  Mg     2.0     06-04    TPro  6.2  /  Alb  3.0<L>  /  TBili  0.4  /  DBili  x   /  AST  16  /  ALT  23  /  AlkPhos  147<H>  06-04    CARDIAC MARKERS ( 03 Jun 2021 05:30 )  <.017 ng/mL / x     / x     / x     / x      CARDIAC MARKERS ( 03 Jun 2021 00:18 )  <.017 ng/mL / x     / x     / x     / x              I&O's Summary    03 Jun 2021 07:01  -  04 Jun 2021 07:00  --------------------------------------------------------  IN: 400 mL / OUT: 1800 mL / NET: -1400 mL          RADIOLOGY & ADDITIONAL STUDIES:  < from: CT Angio Chest PE Protocol w/ IV Cont (06.02.21 @ 23:34) >    EXAM:  CT ANGIO CHEST PULM ART [facility]IC      PROCEDURE DATE:  06/02/2021        INTERPRETATION:  CLINICAL INFORMATION: Tachypnea and dyspnea. Pelvic fractures.    COMPARISON: CT chest 5/17/2021    CONTRAST/COMPLICATIONS:  IV Contrast: Omnipaque 350  75 cc administered   25 cc discarded  Oral Contrast: NONE  Complications: None reported at time of study completion    PROCEDURE:  CT Angiography of the Chest.  Sagittal and coronal reformats were performed as well as 3D (MIP) reconstructions.    FINDINGS:    LUNGS AND AIRWAYS: Patent central airways.  Subsegmental right lower lobe compressive atelectasis adjacent to an elevated right diaphragm.  PLEURA: No pleural effusion.  MEDIASTINUM AND JEANETTE: No lymphadenopathy.  VESSELS: No pulmonary embolus.  HEART: Heart size is normal. No pericardial effusion.  CHEST WALL AND LOWER NECK: Left breast implant.  VISUALIZED UPPER ABDOMEN: Moderate hiatal hernia.  BONES: Healed bilateral rib fractures.    IMPRESSION:  No pulmonary embolus.      MANAS CALVO MD; Attending Radiologist  This document has been electronically signed. Adiel  3 2021 12:44AM    < end of copied text >      ECHO:  < from: TTE with Doppler (w/Cont) (04.23.21 @ 06:44) >    Patient name: JONATHAN CHILD  YOB: 1955   Age: 65 (F)   MR#: [id number]  Study Date: 4/23/2021  Location: [address] [place], [place] [id number]: Yara Craig RDCS  Study quality: Technically fair  Referring Physician: Jorge Luis Farah MD  Blood Pressure:154/76 mmHg  Height: 170 cm  Weight: 60 kg  BSA: 1.7 m2  ------------------------------------------------------------------------  PROCEDURE: Transthoracic echocardiogram with 2-D, M-Mode  and complete spectral and color flow Doppler. Verbal  consentwas obtained for injection of  Ultrasonic Enhancing  Agent following a discussion of risks and benefits.  Following intravenous injection of Ultrasonic Enhancing  Agent , harmonic imaging was performed.  INDICATION: Supraventricular tachycardia (I47.1)  ------------------------------------------------------------------------  Dimensions:    Normal Values:  LA:     2.6    2.0 - 4.0 cm  Ao:     3.3    2.0 - 3.8 cm  SEPTUM: 0.7    0.6 - 1.2 cm  PWT:    0.7    0.6 - 1.1 cm  LVIDd:  3.5    3.0 - 5.6 cm  LVIDs:  2.3    1.8 - 4.0 cm  Derived variables:  LVMI: 37 g/m2  RWT: 0.40  Fractional short: 34 %  EF (Visual Estimate): 70 %  Doppler Peak Velocity (m/sec): AoV=1.3  ------------------------------------------------------------------------  Observations:  Mitral Valve: Normal mitral valve. Minimal mitral  regurgitation.  Aortic Valve/Aorta: Normal trileaflet aortic valve. Peak  transaortic valve gradient equals 7 mm Hg, mean transaortic  valve gradient equals 4 mm Hg, aortic valve velocity time  integral equals 27 cm. No aortic valve regurgitation seen.  Peak left ventricular outflow tract gradient equals 6 mm  Hg, mean gradient is equal to 3 mm Hg, LVOT velocity time  integral equals 26 cm.  Aortic Root: 3.3 cm.  Left Atrium: Normal left atrium.  LA volume index = 26  cc/m2.  Left Ventricle: Endocardial visualization enhanced with  intravenous injection of Ultrasonic Enhancing Agent  (Definity). Normal left ventricular systolic function. No  segmental wall motion abnormalities. Normal left  ventricular internal dimensions and wall thicknesses.  Indeterminate diastolic function.  Right Heart: Normal right atrium. Normal right ventricular  size and function. Normal tricuspid valve. Minimal  tricuspid regurgitation. Normal pulmonic valve. Minimal  pulmonic regurgitation.  Pericardium/Pleura: Normal pericardium with no pericardial  effusion.  Hemodynamic: Estimated right atrial pressure is 8 mm Hg.  Estimated right ventricular systolic pressure equals 42 mm  Hg, assuming right atrial pressure equals 8 mm Hg,  consistent with mild pulmonary hypertension.  ------------------------------------------------------------------------  Conclusions:  1. Normal mitral valve. Minimal mitral regurgitation.  2. Normal trileaflet aortic valve. No aortic valve  regurgitation seen.  3. Endocardial visualization enhanced with intravenous  injection of Ultrasonic Enhancing Agent (Definity). Normal  left ventricular systolic function. No segmental wall  motion abnormalities.  4. Normal right ventricular size and function.  5. Estimated pulmonary artery systolic pressure equals 42  mm Hg, assuming right atrial pressure equals 8 mm Hg,  consistent with mild pulmonary pressures.  *** No previous Echo exam.  ------------------------------------------------------------------------  Confirmed on  4/23/2021 - 08:51:02 by Alireza Moreno M.D.  ------------------------------------------------------------------------    < end of copied text >        
Patient is a 66y old  Female who presents with a chief complaint of SVT, lightheaded, dyspnea (2021 10:58)    Patient seen and examined at bedside. No acute overnight events. no complaints    ALLERGIES:  Tapazole (Hives)    MEDICATIONS  (STANDING):  bethanechol 25 milliGRAM(s) Oral every 8 hours  calcium carbonate 1250 mG  + Vitamin D (OsCal 500 + D) 1 Tablet(s) Oral two times a day  enoxaparin Injectable 40 milliGRAM(s) SubCutaneous daily  escitalopram 10 milliGRAM(s) Oral daily  folic acid 1 milliGRAM(s) Oral daily  levothyroxine 112 MICROGram(s) Oral daily  lidocaine   Patch 1 Patch Transdermal daily  metoprolol tartrate 25 milliGRAM(s) Oral two times a day  multivitamin 1 Tablet(s) Oral daily  nystatin Powder 1 Application(s) Topical two times a day  polyethylene glycol 3350 17 Gram(s) Oral daily  senna 2 Tablet(s) Oral at bedtime  tamoxifen 20 milliGRAM(s) Oral daily  tamsulosin 0.4 milliGRAM(s) Oral at bedtime  traZODone 50 milliGRAM(s) Oral at bedtime  zinc oxide 40% Ointment 1 Application(s) Topical two times a day    MEDICATIONS  (PRN):  acetaminophen   Tablet .. 650 milliGRAM(s) Oral every 6 hours PRN Temp greater or equal to 38C (100.4F), Mild Pain (1 - 3)  oxyCODONE    IR 5 milliGRAM(s) Oral every 6 hours PRN Moderate Pain (4 - 6)    Vital Signs Last 24 Hrs  T(F): 98.7 (2021 10:31), Max: 99.3 (2021 16:12)  HR: 87 (2021 10:31) (67 - 87)  BP: 138/59 (2021 10:31) (99/70 - 138/59)  RR: 16 (2021 10:31) (16 - 18)  SpO2: 100% (2021 10:31) (95% - 100%)  I&O's Summary    2021 07:01  -  2021 07:00  --------------------------------------------------------  IN: 0 mL / OUT: 300 mL / NET: -300 mL    2021 07:01  -  2021 13:59  --------------------------------------------------------  IN: 0 mL / OUT: 600 mL / NET: -600 mL      BMI (kg/m2): 20.5 (21 @ 23:58)  PHYSICAL EXAM:  General: NAD, A/O x 3  ENT: MMM, no scleral icterus  Neck: Supple, No JVD  Lungs: Respirations unlabored. Clear to auscultation bilaterally, no wheezes, rales, rhonchi  Cardio: RRR, S1/S2, No murmurs  Abdomen: Soft, Nontender, Nondistended; Bowel sounds present  Extremities: No calf tenderness, No pitting edema b/l    LABS:                        13.3   12.84 )-----------( 188      ( 2021 05:00 )             40.5       06-05    145  |  109  |  29  ----------------------------<  110  4.3   |  24  |  0.81    Ca    9.3      2021 05:00  Mg     2.0     06-04    TPro  6.7  /  Alb  3.2  /  TBili  0.3  /  DBili  x   /  AST  11  /  ALT  23  /  AlkPhos  142  06-05     eGFR if Non African American: 74 mL/min/1.73M2 (21 @ 05:00)  eGFR if African American: 86 mL/min/1.73M2 (21 @ 05:00)         CARDIAC MARKERS ( 2021 05:30 )  <.017 ng/mL / x     / x     / x     / x      CARDIAC MARKERS ( 2021 00:18 )  <.017 ng/mL / x     / x     / x     / x        TSH 0.41   TSH with FT4 reflex --  Total T3 --    Urinalysis Basic - ( 2021 01:14 )    Color: Yellow / Appearance: Clear / S.015 / pH: x  Gluc: x / Ketone: Negative  / Bili: Negative / Urobili: Negative   Blood: x / Protein: 30 mg/dL / Nitrite: Negative   Leuk Esterase: Moderate / RBC: 5-10 /HPF / WBC 11-25 /HPF   Sq Epi: x / Non Sq Epi: Neg.-Few / Bacteria: Moderate /HPF    COVID-19 PCR: NotDetec (21 @ 05:00)  COVID-19 PCR: NotDetec (21 @ 05:00)  COVID-19 PCR: NotDetec (05-10-21 @ 05:00)      RADIOLOGY & ADDITIONAL TESTS: reviewed    Care Discussed with Consultants/Other Providers: yes - cardio  
Follow up for svt  SUBJ:    no cardiac complaints offered    PMH  Hypothyroidism    Hyperthyroidism    Breast cancer        MEDICATIONS  (STANDING):  bethanechol 25 milliGRAM(s) Oral every 8 hours  calcium carbonate 1250 mG  + Vitamin D (OsCal 500 + D) 1 Tablet(s) Oral two times a day  enoxaparin Injectable 40 milliGRAM(s) SubCutaneous daily  escitalopram 10 milliGRAM(s) Oral daily  folic acid 1 milliGRAM(s) Oral daily  levothyroxine 112 MICROGram(s) Oral daily  lidocaine   Patch 1 Patch Transdermal daily  melatonin 3 milliGRAM(s) Oral at bedtime  metoprolol tartrate 25 milliGRAM(s) Oral two times a day  multivitamin 1 Tablet(s) Oral daily  nystatin Powder 1 Application(s) Topical two times a day  polyethylene glycol 3350 17 Gram(s) Oral daily  senna 2 Tablet(s) Oral at bedtime  tamoxifen 20 milliGRAM(s) Oral daily  traZODone 50 milliGRAM(s) Oral at bedtime  zinc oxide 40% Ointment 1 Application(s) Topical two times a day    MEDICATIONS  (PRN):  acetaminophen   Tablet .. 650 milliGRAM(s) Oral every 6 hours PRN Temp greater or equal to 38C (100.4F), Mild Pain (1 - 3)  oxyCODONE    IR 5 milliGRAM(s) Oral every 6 hours PRN Moderate Pain (4 - 6)        PHYSICAL EXAM:  Vital Signs Last 24 Hrs  T(C): 36.8 (08 Jun 2021 16:09), Max: 37.1 (08 Jun 2021 00:00)  T(F): 98.3 (08 Jun 2021 16:09), Max: 98.8 (08 Jun 2021 00:00)  HR: 82 (08 Jun 2021 17:12) (58 - 86)  BP: 105/65 (08 Jun 2021 17:12) (95/57 - 131/87)  BP(mean): --  RR: 16 (08 Jun 2021 16:09) (16 - 18)  SpO2: 94% (08 Jun 2021 16:09) (94% - 96%)    GENERAL: NAD, well-groomed, well-developed  HEAD:  Atraumatic, Normocephalic  EYES: EOMI, PERRLA, conjunctiva and sclera clear  ENT: Moist mucous membranes,  NECK: Supple, No JVD, no bruits  CHEST/LUNG: Clear to percussion bilaterally; No rales, rhonchi, wheezing, or rubs  HEART: Regular rate and rhythm; No murmurs, rubs, or gallops PMI non displaced.  ABDOMEN: Soft, Nontender, Nondistended; Bowel sounds present  EXTREMITIES:  2+ Peripheral Pulses, No clubbing, cyanosis, or edema  SKIN: No rashes or lesions  NERVOUS SYSTEM:  Cranial Nerves II-XII intact      TELEMETRY:    rsr sb    ECG:  ECHO:      < from: TTE with Doppler (w/Cont) (04.23.21 @ 06:44) >  Conclusions:  1. Normal mitral valve. Minimal mitral regurgitation.  2. Normal trileaflet aortic valve. No aortic valve  regurgitation seen.  3. Endocardial visualization enhanced with intravenous  injection of Ultrasonic Enhancing Agent (Definity). Normal  left ventricular systolic function. No segmental wall  motion abnormalities.  4. Normal right ventricular size and function.  5. Estimated pulmonary artery systolic pressure equals 42  mm Hg, assuming right atrial pressure equals 8 mm Hg,  consistent with mild pulmonary pressures.  *** No previous Echo exam.  ------------------------------------------------------------------------  Confirmed on  4/23/2021 - 08:51:02 by Alireza Moreno M.D.  ------------------------------------------------------------------------    < end of copied text >    LABS:                        13.1   11.75 )-----------( 196      ( 08 Jun 2021 06:30 )             40.5     06-08    145  |  110<H>  |  34<H>  ----------------------------<  122<H>  4.4   |  24  |  0.61    Ca    9.1      08 Jun 2021 06:30      I&O's Summary    07 Jun 2021 07:01  -  08 Jun 2021 07:00  --------------------------------------------------------  IN: 0 mL / OUT: 900 mL / NET: -900 mL      BNP    RADIOLOGY & ADDITIONAL STUDIES:    < from: Xray Chest 1 View- PORTABLE-Routine (04.25.21 @ 05:24) >  IMPRESSION: Multiple left-sided ribfractures are present. The cardiac silhouette cannot be evaluated. There is a small left pleural effusion. The right lung is grossly clear.                LIZETTE BARNETT MD; Attending Radiologist  This document has been electronically signed. Apr 25 2021  9:35AM    < end of copied text >    impression  svt  resolved tn negx2 pe -continue medical therapy      
( x ) No complaints (  ) Complains of:     T(F): 98.4 (06-07-21 @ 13:24), Max: 99.5 (06-06-21 @ 17:26)  HR: 72 (06-07-21 @ 13:24) (57 - 72)  BP: 105/69 (06-07-21 @ 13:24) (98/61 - 105/89)  RR: 12 (06-07-21 @ 13:24) (12 - 17)  SpO2: 94% (06-07-21 @ 13:24) (94% - 98%)        Wound Location 1: steri strips removed from forehead wound,  incisions healing well.  left eyebrow incision well healed                                 13.6   9.16  )-----------( 203      ( 07 Jun 2021 07:57 )             42.2     CBC Full  -  ( 07 Jun 2021 07:57 )  WBC Count : 9.16 K/uL  RBC Count : 4.62 M/uL  Hemoglobin : 13.6 g/dL  Hematocrit : 42.2 %  Platelet Count - Automated : 203 K/uL  Mean Cell Volume : 91.3 fl  Mean Cell Hemoglobin : 29.4 pg  Mean Cell Hemoglobin Concentration : 32.2 gm/dL  Auto Neutrophil # : x  Auto Lymphocyte # : x  Auto Monocyte # : x  Auto Eosinophil # : x  Auto Basophil # : x  Auto Neutrophil % : x  Auto Lymphocyte % : x  Auto Monocyte % : x  Auto Eosinophil % : x  Auto Basophil % : x    145|110|27<130  4.2|25|0.74  9.4,--,--  06-07 @ 07:57                  Procedure Performed:  (  )Yes     (x  )No  Name of Procedure:  (  )debridement    (  )I&D    (  )Other:  (  )partial thickness     (  )full thickness     (  )subcutaneous     (  )muscle/tendon     (  )bone  (  )sharp     (  )surgical      
Follow up for svt  SUBJ: pt offers no complaints. no cp no sob  PMH  Hypothyroidism    Hyperthyroidism    Breast cancer        MEDICATIONS  (STANDING):  bethanechol 25 milliGRAM(s) Oral every 8 hours  calcium carbonate 1250 mG  + Vitamin D (OsCal 500 + D) 1 Tablet(s) Oral two times a day  enoxaparin Injectable 40 milliGRAM(s) SubCutaneous daily  escitalopram 10 milliGRAM(s) Oral daily  folic acid 1 milliGRAM(s) Oral daily  levothyroxine 112 MICROGram(s) Oral daily  lidocaine   Patch 1 Patch Transdermal daily  melatonin 3 milliGRAM(s) Oral at bedtime  metoprolol tartrate 25 milliGRAM(s) Oral two times a day  multivitamin 1 Tablet(s) Oral daily  nystatin Powder 1 Application(s) Topical two times a day  polyethylene glycol 3350 17 Gram(s) Oral daily  senna 2 Tablet(s) Oral at bedtime  tamoxifen 20 milliGRAM(s) Oral daily  traZODone 50 milliGRAM(s) Oral at bedtime  zinc oxide 40% Ointment 1 Application(s) Topical two times a day    MEDICATIONS  (PRN):  acetaminophen   Tablet .. 650 milliGRAM(s) Oral every 6 hours PRN Temp greater or equal to 38C (100.4F), Mild Pain (1 - 3)  oxyCODONE    IR 5 milliGRAM(s) Oral every 6 hours PRN Moderate Pain (4 - 6)        PHYSICAL EXAM:  Vital Signs Last 24 Hrs  T(C): 36.9 (07 Jun 2021 13:24), Max: 37.5 (06 Jun 2021 17:26)  T(F): 98.4 (07 Jun 2021 13:24), Max: 99.5 (06 Jun 2021 17:26)  HR: 72 (07 Jun 2021 13:24) (57 - 72)  BP: 105/69 (07 Jun 2021 13:24) (98/61 - 105/89)  BP(mean): 82 (06 Jun 2021 17:26) (82 - 82)  RR: 12 (07 Jun 2021 13:24) (12 - 17)  SpO2: 94% (07 Jun 2021 13:24) (94% - 98%)    GENERAL: NAD, well-groomed, well-developed  HEAD:  Atraumatic, Normocephalic  EYES: EOMI, PERRLA, conjunctiva and sclera clear  ENT: Moist mucous membranes,  NECK: Supple, No JVD, no bruits  CHEST/LUNG: Clear to percussion bilaterally; No rales, rhonchi, wheezing, or rubs  HEART: Regular rate and rhythm; No murmurs, rubs, or gallops PMI non displaced.  ABDOMEN: Soft, Nontender, Nondistended; Bowel sounds present  EXTREMITIES:  2+ Peripheral Pulses, No clubbing, cyanosis, or edema  SKIN: No rashes or lesions  NERVOUS SYSTEM:  Alert & Oriented X3, Good concentration; Motor Strength 5/5 B/L upper and lower extremities; DTRs 2+ intact and symmetric      TELEMETRY:rsr    ECG:    LABS:                        13.6   9.16  )-----------( 203      ( 07 Jun 2021 07:57 )             42.2     06-07    145  |  110<H>  |  27<H>  ----------------------------<  130<H>  4.2   |  25  |  0.74    Ca    9.4      07 Jun 2021 07:57              I&O's Summary    06 Jun 2021 07:01  -  07 Jun 2021 07:00  --------------------------------------------------------  IN: 200 mL / OUT: 1300 mL / NET: -1100 mL      BNP    RADIOLOGY & ADDITIONAL STUDIES:    ECHO:      
Patient is a 66y old  Female who presents with a chief complaint of SVT, lightheaded, dyspnea (05 Jun 2021 13:59)      Patient seen and examined at bedside. doing well, slept better. no acute complaints. no additional episodes of SVT since transient 6/4 episode.     ALLERGIES:  Tapazole (Hives)    MEDICATIONS  (STANDING):  bethanechol 25 milliGRAM(s) Oral every 8 hours  calcium carbonate 1250 mG  + Vitamin D (OsCal 500 + D) 1 Tablet(s) Oral two times a day  enoxaparin Injectable 40 milliGRAM(s) SubCutaneous daily  escitalopram 10 milliGRAM(s) Oral daily  folic acid 1 milliGRAM(s) Oral daily  levothyroxine 112 MICROGram(s) Oral daily  lidocaine   Patch 1 Patch Transdermal daily  melatonin 3 milliGRAM(s) Oral at bedtime  metoprolol tartrate 25 milliGRAM(s) Oral two times a day  multivitamin 1 Tablet(s) Oral daily  nystatin Powder 1 Application(s) Topical two times a day  polyethylene glycol 3350 17 Gram(s) Oral daily  senna 2 Tablet(s) Oral at bedtime  tamoxifen 20 milliGRAM(s) Oral daily  traZODone 50 milliGRAM(s) Oral at bedtime  zinc oxide 40% Ointment 1 Application(s) Topical two times a day    MEDICATIONS  (PRN):  acetaminophen   Tablet .. 650 milliGRAM(s) Oral every 6 hours PRN Temp greater or equal to 38C (100.4F), Mild Pain (1 - 3)  oxyCODONE    IR 5 milliGRAM(s) Oral every 6 hours PRN Moderate Pain (4 - 6)    Vital Signs Last 24 Hrs  T(F): 98 (06 Jun 2021 08:26), Max: 98.8 (05 Jun 2021 16:20)  HR: 66 (06 Jun 2021 11:40) (60 - 103)  BP: 98/64 (06 Jun 2021 11:40) (98/53 - 142/70)  RR: 16 (06 Jun 2021 08:26) (16 - 16)  SpO2: 97% (06 Jun 2021 11:40) (95% - 97%)  I&O's Summary    05 Jun 2021 07:01  -  06 Jun 2021 07:00  --------------------------------------------------------  IN: 200 mL / OUT: 1100 mL / NET: -900 mL      BMI (kg/m2): 22.2 (06-05-21 @ 18:07)  PHYSICAL EXAM:  General: NAD, A/O x 3  ENT: MMM, no scleral icterus  Neck: Supple, No JVD  Lungs: Respirations unlabored. Clear to auscultation bilaterally, no wheezes, rales, rhonchi  Cardio: RRR, S1/S2, No murmurs  Abdomen: Soft, Nontender, Nondistended; Bowel sounds present  Extremities: No calf tenderness, No pitting edema b/l    LABS:                        13.7   9.79  )-----------( 203      ( 06 Jun 2021 05:00 )             42.1       06-06    143  |  108  |  32  ----------------------------<  118  4.3   |  22  |  0.78    Ca    9.3      06 Jun 2021 05:00  Mg     2.0     06-04    TPro  6.7  /  Alb  3.2  /  TBili  0.3  /  DBili  x   /  AST  11  /  ALT  23  /  AlkPhos  142  06-05     eGFR if Non African American: 78 mL/min/1.73M2 (06-06-21 @ 05:00)  eGFR if : 91 mL/min/1.73M2 (06-06-21 @ 05:00)    COVID-19 PCR: NotDetec (05-22-21 @ 05:00)  COVID-19 PCR: NotDetec (05-16-21 @ 05:00)  COVID-19 PCR: NotDetec (05-10-21 @ 05:00)    RADIOLOGY & ADDITIONAL TESTS: reviewed    Care Discussed with Consultants/Other Providers: yes
Patient is a 66y old  Female who presents with a chief complaint of SVT, lightheaded, dyspnea (06 Jun 2021 12:04)    Patient seen and examined at bedside.  no acute overnight events, pt reports feeling well    ALLERGIES:  Tapazole (Hives)        Vital Signs Last 24 Hrs  T(F): 98.7 (07 Jun 2021 08:14), Max: 99.5 (06 Jun 2021 17:26)  HR: 57 (07 Jun 2021 08:14) (57 - 71)  BP: 105/89 (07 Jun 2021 08:14) (98/61 - 111/67)  RR: 17 (07 Jun 2021 08:14) (16 - 17)  SpO2: 98% (07 Jun 2021 08:14) (96% - 98%)  I&O's Summary    06 Jun 2021 07:01  -  07 Jun 2021 07:00  --------------------------------------------------------  IN: 200 mL / OUT: 1300 mL / NET: -1100 mL      MEDICATIONS:  acetaminophen   Tablet .. 650 milliGRAM(s) Oral every 6 hours PRN  bethanechol 25 milliGRAM(s) Oral every 8 hours  calcium carbonate 1250 mG  + Vitamin D (OsCal 500 + D) 1 Tablet(s) Oral two times a day  enoxaparin Injectable 40 milliGRAM(s) SubCutaneous daily  escitalopram 10 milliGRAM(s) Oral daily  folic acid 1 milliGRAM(s) Oral daily  levothyroxine 112 MICROGram(s) Oral daily  lidocaine   Patch 1 Patch Transdermal daily  melatonin 3 milliGRAM(s) Oral at bedtime  metoprolol tartrate 25 milliGRAM(s) Oral two times a day  multivitamin 1 Tablet(s) Oral daily  nystatin Powder 1 Application(s) Topical two times a day  oxyCODONE    IR 5 milliGRAM(s) Oral every 6 hours PRN  polyethylene glycol 3350 17 Gram(s) Oral daily  senna 2 Tablet(s) Oral at bedtime  tamoxifen 20 milliGRAM(s) Oral daily  traZODone 50 milliGRAM(s) Oral at bedtime  zinc oxide 40% Ointment 1 Application(s) Topical two times a day      PHYSICAL EXAM:  General: NAD, A/O x 3  ENT: MMM, no thrush  Neck: Supple, No JVD  Lungs: Clear to auscultation bilaterally, non labored breathing  Cardio: S1S2 regular  Abdomen: Soft, Nontender  Extremities: No cyanosis, No edema    LABS:                        13.6   9.16  )-----------( 203      ( 07 Jun 2021 07:57 )             42.2     06-07    145  |  110  |  27  ----------------------------<  130  4.2   |  25  |  0.74    Ca    9.4      07 Jun 2021 07:57    TPro  6.7  /  Alb  3.2  /  TBili  0.3  /  DBili  x   /  AST  11  /  ALT  23  /  AlkPhos  142  06-05    eGFR if Non African American: 84 mL/min/1.73M2 (06-07-21 @ 07:57)  eGFR if : 97 mL/min/1.73M2 (06-07-21 @ 07:57)                                  COVID-19 PCR: NotDetec (05-22-21 @ 05:00)  COVID-19 PCR: NotDetec (05-16-21 @ 05:00)  COVID-19 PCR: NotDetec (05-10-21 @ 05:00)      RADIOLOGY & ADDITIONAL TESTS:    Care Discussed with Consultants/Other Providers:   
Patient is a 66y old  Female who presents with a chief complaint of SVT, lightheaded, dyspnea (07 Jun 2021 13:51)    Patient seen and examined at bedside.  no acute events overnight    ALLERGIES:  Tapazole (Hives)        Vital Signs Last 24 Hrs  T(F): 98.5 (08 Jun 2021 08:23), Max: 99 (07 Jun 2021 13:58)  HR: 58 (08 Jun 2021 08:23) (58 - 84)  BP: 95/57 (08 Jun 2021 08:23) (95/57 - 112/70)  RR: 16 (08 Jun 2021 08:23) (12 - 18)  SpO2: 96% (08 Jun 2021 08:23) (93% - 96%)  I&O's Summary    07 Jun 2021 07:01  -  08 Jun 2021 07:00  --------------------------------------------------------  IN: 0 mL / OUT: 900 mL / NET: -900 mL      MEDICATIONS:  acetaminophen   Tablet .. 650 milliGRAM(s) Oral every 6 hours PRN  bethanechol 25 milliGRAM(s) Oral every 8 hours  calcium carbonate 1250 mG  + Vitamin D (OsCal 500 + D) 1 Tablet(s) Oral two times a day  enoxaparin Injectable 40 milliGRAM(s) SubCutaneous daily  escitalopram 10 milliGRAM(s) Oral daily  folic acid 1 milliGRAM(s) Oral daily  levothyroxine 112 MICROGram(s) Oral daily  lidocaine   Patch 1 Patch Transdermal daily  melatonin 3 milliGRAM(s) Oral at bedtime  metoprolol tartrate 25 milliGRAM(s) Oral two times a day  multivitamin 1 Tablet(s) Oral daily  nystatin Powder 1 Application(s) Topical two times a day  oxyCODONE    IR 5 milliGRAM(s) Oral every 6 hours PRN  polyethylene glycol 3350 17 Gram(s) Oral daily  senna 2 Tablet(s) Oral at bedtime  tamoxifen 20 milliGRAM(s) Oral daily  traZODone 50 milliGRAM(s) Oral at bedtime  zinc oxide 40% Ointment 1 Application(s) Topical two times a day      PHYSICAL EXAM:  General: NAD, A/O x 3  ENT: MMM, no thrush  Neck: Supple, No JVD  Lungs: Clear to auscultation bilaterally, non labored breathing  Cardio: S1S2 regular  Abdomen: Soft, Nontender, Nondistended; Bowel sounds present  Extremities: No cyanosis, No edema    LABS:                        13.1   11.75 )-----------( 196      ( 08 Jun 2021 06:30 )             40.5     06-08    145  |  110  |  34  ----------------------------<  122  4.4   |  24  |  0.61    Ca    9.1      08 Jun 2021 06:30      eGFR if Non : 93 mL/min/1.73M2 (06-08-21 @ 06:30)  eGFR if : 108 mL/min/1.73M2 (06-08-21 @ 06:30)                                  COVID-19 PCR: NotDetec (05-22-21 @ 05:00)  COVID-19 PCR: NotDetec (05-16-21 @ 05:00)  COVID-19 PCR: NotDetec (05-10-21 @ 05:00)      RADIOLOGY & ADDITIONAL TESTS:    Care Discussed with Consultants/Other Providers:   
Patient is a 66y old  Female who presents with a chief complaint of SVT, lightheaded, dyspnea (08 Jun 2021 20:00)    No acute events  Eating breakfast   Patient seen and examined at bedside.    ALLERGIES:  Tapazole (Hives)    MEDICATIONS  (STANDING):  bethanechol 25 milliGRAM(s) Oral every 8 hours  calcium carbonate 1250 mG  + Vitamin D (OsCal 500 + D) 1 Tablet(s) Oral two times a day  enoxaparin Injectable 40 milliGRAM(s) SubCutaneous daily  escitalopram 10 milliGRAM(s) Oral daily  folic acid 1 milliGRAM(s) Oral daily  levothyroxine 112 MICROGram(s) Oral daily  lidocaine   Patch 1 Patch Transdermal daily  melatonin 3 milliGRAM(s) Oral at bedtime  metoprolol tartrate 25 milliGRAM(s) Oral two times a day  multivitamin 1 Tablet(s) Oral daily  nystatin Powder 1 Application(s) Topical two times a day  polyethylene glycol 3350 17 Gram(s) Oral daily  senna 2 Tablet(s) Oral at bedtime  tamoxifen 20 milliGRAM(s) Oral daily  traZODone 50 milliGRAM(s) Oral at bedtime  zinc oxide 40% Ointment 1 Application(s) Topical two times a day    MEDICATIONS  (PRN):  acetaminophen   Tablet .. 650 milliGRAM(s) Oral every 6 hours PRN Temp greater or equal to 38C (100.4F), Mild Pain (1 - 3)  oxyCODONE    IR 5 milliGRAM(s) Oral every 6 hours PRN Moderate Pain (4 - 6)    Vital Signs Last 24 Hrs  T(F): 98.1 (09 Jun 2021 05:32), Max: 98.6 (08 Jun 2021 20:28)  HR: 64 (09 Jun 2021 05:32) (64 - 86)  BP: 96/54 (09 Jun 2021 05:32) (96/54 - 131/87)  RR: 17 (09 Jun 2021 05:32) (16 - 17)  SpO2: 92% (09 Jun 2021 05:32) (92% - 98%)  I&O's Summary    08 Jun 2021 07:01  -  09 Jun 2021 07:00  --------------------------------------------------------  IN: 0 mL / OUT: 500 mL / NET: -500 mL    09 Jun 2021 07:01  -  09 Jun 2021 11:26  --------------------------------------------------------  IN: 636 mL / OUT: 0 mL / NET: 636 mL      BMI (kg/m2): 22.2 (06-05-21 @ 18:07)  PHYSICAL EXAM:  General: NAD, A/O x 3 but slow to answer   ENT: MMM, no thrush  Neck: Supple, No JVD  Lungs: Non labored breathing,  Clear to auscultation bilaterally,   Cardio: RRR, S1/S2,  no pitting edema bilaterally  Abdomen: Soft, Nontender, Nondistended; Bowel sounds present  Extremities: No calf tenderness, moves all extremities    LABS:                        13.1   11.75 )-----------( 196      ( 08 Jun 2021 06:30 )             40.5       06-08    145  |  110  |  34  ----------------------------<  122  4.4   |  24  |  0.61    Ca    9.1      08 Jun 2021 06:30       eGFR if Non : 93 mL/min/1.73M2 (06-08-21 @ 06:30)  eGFR if : 108 mL/min/1.73M2 (06-08-21 @ 06:30)                                   COVID-19 PCR: NotDetec (05-22-21 @ 05:00)  COVID-19 PCR: NotDetec (05-16-21 @ 05:00)      RADIOLOGY & ADDITIONAL TESTS:    Care Discussed with Consultants/Other Providers:

## 2021-06-09 NOTE — PROGRESS NOTE ADULT - ATTENDING COMMENTS
Pt seen and examined at bedside.  No acute events overnight    Pt with SVT after holding metoprolol. BB resumed, hold for SBP <90, HR < 60. well tolerated. medically optimized   32mins spent discussing discharge instructions with the pt and son

## 2021-06-09 NOTE — DISCHARGE NOTE PROVIDER - HOSPITAL COURSE
66 F with htn, hypothyroid, breast ca with mastectomy, transferred from [facility] with 's with associated palpitations, lightheadedness, mild dyspnea. Pt was recently hospitalized at [facility] 4/19/21 s/p fall found with SAH, left rib fractures 3-8, left iliac wing fracture with hematoma, received one unit PRBCs, left clavicle fracture, left nasal bone and left orbital rim fractures, sternal fracture, left pinky PIP dislocation - s/p closed reduction.  Rehab course c.b SVT and transferred to hospitalist service. Troponin negative x 2. Beta blocker was held in rehab but restarted and patient converted to sinus rhythm. Monitored on tele with no acute events    PM+R re evaluated patient and patient denied for acute rehab  Medically stable for discharge to [facility]   66 F with htn, hypothyroid, breast ca with mastectomy, transferred from [facility] with 's with associated palpitations, lightheadedness, mild dyspnea. Pt was recently hospitalized at [facility] 4/19/21 s/p fall found with SAH, left rib fractures 3-8, left iliac wing fracture with hematoma, received one unit PRBCs, left clavicle fracture, left nasal bone and left orbital rim fractures, sternal fracture, left pinky PIP dislocation - s/p closed reduction.  Rehab course c.b SVT and transferred to hospitalist service. Troponin negative x 2. Beta blocker was held in rehab but restarted and patient converted to sinus rhythm. Monitored on tele with no acute events    PM+R re evaluated patient and patient denied for acute rehab  Medically stable for discharge to [facility]. Discharged to [facility] re[facility]. Dr Yony Garcia will follow patient at [facility] ([phone])

## 2021-06-09 NOTE — DISCHARGE NOTE PROVIDER - NSDCCPCAREPLAN_GEN_ALL_CORE_FT
PRINCIPAL DISCHARGE DIAGNOSIS  Diagnosis: SVT (supraventricular tachycardia)  Assessment and Plan of Treatment: due to beta blocker being held  beta blocker was restarted and heart rate improved

## 2021-06-09 NOTE — DISCHARGE NOTE PROVIDER - NSDCFUADDINST_GEN_ALL_CORE_FT
Patient to continue with Tamoxifen follow up with heme/onc outpatient to determine need for further treatment as indicated.

## 2021-07-09 PROCEDURE — 85610 PROTHROMBIN TIME: CPT

## 2021-07-09 PROCEDURE — 97110 THERAPEUTIC EXERCISES: CPT

## 2021-07-09 PROCEDURE — 92610 EVALUATE SWALLOWING FUNCTION: CPT

## 2021-07-09 PROCEDURE — 83002 ASSAY OF GONADOTROPIN (LH): CPT

## 2021-07-09 PROCEDURE — 82746 ASSAY OF FOLIC ACID SERUM: CPT

## 2021-07-09 PROCEDURE — 97116 GAIT TRAINING THERAPY: CPT

## 2021-07-09 PROCEDURE — 82248 BILIRUBIN DIRECT: CPT

## 2021-07-09 PROCEDURE — 72148 MRI LUMBAR SPINE W/O DYE: CPT

## 2021-07-09 PROCEDURE — U0003: CPT

## 2021-07-09 PROCEDURE — 74176 CT ABD & PELVIS W/O CONTRAST: CPT

## 2021-07-09 PROCEDURE — 93005 ELECTROCARDIOGRAM TRACING: CPT

## 2021-07-09 PROCEDURE — U0005: CPT

## 2021-07-09 PROCEDURE — 97530 THERAPEUTIC ACTIVITIES: CPT

## 2021-07-09 PROCEDURE — 82533 TOTAL CORTISOL: CPT

## 2021-07-09 PROCEDURE — 81001 URINALYSIS AUTO W/SCOPE: CPT

## 2021-07-09 PROCEDURE — 82652 VIT D 1 25-DIHYDROXY: CPT

## 2021-07-09 PROCEDURE — 83540 ASSAY OF IRON: CPT

## 2021-07-09 PROCEDURE — 86769 SARS-COV-2 COVID-19 ANTIBODY: CPT

## 2021-07-09 PROCEDURE — 97167 OT EVAL HIGH COMPLEX 60 MIN: CPT

## 2021-07-09 PROCEDURE — 88304 TISSUE EXAM BY PATHOLOGIST: CPT

## 2021-07-09 PROCEDURE — 82977 ASSAY OF GGT: CPT

## 2021-07-09 PROCEDURE — 72125 CT NECK SPINE W/O DYE: CPT

## 2021-07-09 PROCEDURE — 97112 NEUROMUSCULAR REEDUCATION: CPT

## 2021-07-09 PROCEDURE — 72128 CT CHEST SPINE W/O DYE: CPT

## 2021-07-09 PROCEDURE — 85730 THROMBOPLASTIN TIME PARTIAL: CPT

## 2021-07-09 PROCEDURE — 70486 CT MAXILLOFACIAL W/O DYE: CPT

## 2021-07-09 PROCEDURE — 83001 ASSAY OF GONADOTROPIN (FSH): CPT

## 2021-07-09 PROCEDURE — 86901 BLOOD TYPING SEROLOGIC RH(D): CPT

## 2021-07-09 PROCEDURE — 84443 ASSAY THYROID STIM HORMONE: CPT

## 2021-07-09 PROCEDURE — 84439 ASSAY OF FREE THYROXINE: CPT

## 2021-07-09 PROCEDURE — 76700 US EXAM ABDOM COMPLETE: CPT

## 2021-07-09 PROCEDURE — 97535 SELF CARE MNGMENT TRAINING: CPT

## 2021-07-09 PROCEDURE — 86850 RBC ANTIBODY SCREEN: CPT

## 2021-07-09 PROCEDURE — 80076 HEPATIC FUNCTION PANEL: CPT

## 2021-07-09 PROCEDURE — 82670 ASSAY OF TOTAL ESTRADIOL: CPT

## 2021-07-09 PROCEDURE — 92507 TX SP LANG VOICE COMM INDIV: CPT

## 2021-07-09 PROCEDURE — 92523 SPEECH SOUND LANG COMPREHEN: CPT

## 2021-07-09 PROCEDURE — 71250 CT THORAX DX C-: CPT

## 2021-07-09 PROCEDURE — 85025 COMPLETE CBC W/AUTO DIFF WBC: CPT

## 2021-07-09 PROCEDURE — 86900 BLOOD TYPING SEROLOGIC ABO: CPT

## 2021-07-09 PROCEDURE — 82607 VITAMIN B-12: CPT

## 2021-07-09 PROCEDURE — 83550 IRON BINDING TEST: CPT

## 2021-07-09 PROCEDURE — 80048 BASIC METABOLIC PNL TOTAL CA: CPT

## 2021-07-09 PROCEDURE — 97163 PT EVAL HIGH COMPLEX 45 MIN: CPT

## 2021-07-09 PROCEDURE — 36415 COLL VENOUS BLD VENIPUNCTURE: CPT

## 2021-07-09 PROCEDURE — 71275 CT ANGIOGRAPHY CHEST: CPT

## 2021-07-09 PROCEDURE — 80053 COMPREHEN METABOLIC PANEL: CPT

## 2021-07-09 PROCEDURE — 87635 SARS-COV-2 COVID-19 AMP PRB: CPT

## 2021-07-09 PROCEDURE — 70450 CT HEAD/BRAIN W/O DYE: CPT

## 2021-07-09 PROCEDURE — 83735 ASSAY OF MAGNESIUM: CPT

## 2021-07-09 PROCEDURE — 87086 URINE CULTURE/COLONY COUNT: CPT

## 2021-07-09 PROCEDURE — 85027 COMPLETE CBC AUTOMATED: CPT

## 2021-07-09 PROCEDURE — 87186 SC STD MICRODIL/AGAR DIL: CPT

## 2021-07-09 PROCEDURE — 72131 CT LUMBAR SPINE W/O DYE: CPT

## 2022-07-25 NOTE — BH CONSULTATION LIAISON PROGRESS NOTE - NSBHCONSULTMEDNEW_PSY_A_CORE
Patient provided with discharge instructions. Verbalized understanding for plan of care at home and follow up. All question and concerns addressed prior to discharge.
no
10
no

## 2023-02-24 NOTE — PROGRESS NOTE ADULT - SUBJECTIVE AND OBJECTIVE BOX
Patient is a 65y old  Female who presents with a chief complaint of SAH and fractures of left-sided ribs, left iliac wing with hematoma, S/P closed reduction of left pinky PIP dislocation after trauma (30 May 2021 14:05)      SUBJECTIVE / OVERNIGHT EVENTS:  Pt seen and examined at bedside in the presence of 1:1. No acute events overnight.  Pt denies cp, palpitations, sob, abd pain, N/V, fever, chills.    ROS:  All other review of systems negative    Allergies    Tapazole (Hives)    Intolerances        MEDICATIONS  (STANDING):  bethanechol 25 milliGRAM(s) Oral every 8 hours  enoxaparin Injectable 40 milliGRAM(s) SubCutaneous every 24 hours  escitalopram 10 milliGRAM(s) Oral daily  folic acid 1 milliGRAM(s) Oral daily  levothyroxine 112 MICROGram(s) Oral daily  lidocaine   Patch 1 Patch Transdermal daily  metoprolol succinate ER 50 milliGRAM(s) Oral daily  multivitamin 1 Tablet(s) Oral daily  nystatin Powder 1 Application(s) Topical two times a day  oxyCODONE    IR 5 milliGRAM(s) Oral <User Schedule>  polyethylene glycol 3350 17 Gram(s) Oral daily  senna 2 Tablet(s) Oral at bedtime  tamoxifen 20 milliGRAM(s) Oral daily  tamsulosin 0.4 milliGRAM(s) Oral at bedtime  traZODone 50 milliGRAM(s) Oral at bedtime  zinc oxide 40% Ointment 1 Application(s) Topical two times a day    MEDICATIONS  (PRN):  acetaminophen   Tablet .. 650 milliGRAM(s) Oral every 6 hours PRN Temp greater or equal to 38C (100.4F), Mild Pain (1 - 3)      Vital Signs Last 24 Hrs  T(C): 36.8 (31 May 2021 07:35), Max: 36.9 (30 May 2021 20:16)  T(F): 98.2 (31 May 2021 07:35), Max: 98.4 (30 May 2021 20:16)  HR: 69 (31 May 2021 07:35) (69 - 69)  BP: 97/65 (31 May 2021 07:35) (97/65 - 104/62)  BP(mean): --  RR: 14 (31 May 2021 07:35) (14 - 14)  SpO2: 98% (31 May 2021 07:35) (93% - 98%)  CAPILLARY BLOOD GLUCOSE        I&O's Summary    30 May 2021 07:01  -  31 May 2021 07:00  --------------------------------------------------------  IN: 1000 mL / OUT: 700 mL / NET: 300 mL    31 May 2021 07:01  -  31 May 2021 09:13  --------------------------------------------------------  IN: 0 mL / OUT: 100 mL / NET: -100 mL        PHYSICAL EXAM:  GENERAL: NAD, well-developed  CHEST/LUNG: Clear to auscultation bilaterally; No wheeze, nonlabored breathing  HEART: Regular rate and rhythm; No murmurs, rubs, or gallops  ABDOMEN: Soft, Nontender, Nondistended; Bowel sounds present  EXTREMITIES:  2+ Peripheral Pulses, No clubbing, cyanosis, or edema  NEUROLOGY: AAOx3  PSYCH: calm, appropriate mood    LABS:                        12.9   8.08  )-----------( 193      ( 31 May 2021 05:00 )             39.8     05-31    145  |  109<H>  |  24<H>  ----------------------------<  101<H>  4.4   |  24  |  0.66    Ca    9.1      31 May 2021 05:00    TPro  6.5  /  Alb  3.1<L>  /  TBili  0.4  /  DBili  x   /  AST  15  /  ALT  24  /  AlkPhos  179<H>  05-31              RADIOLOGY & ADDITIONAL TESTS:  Results Reviewed:   Imaging Personally Reviewed:  Electrocardiogram Personally Reviewed:    COORDINATION OF CARE:  Care Discussed with Consultants/Other Providers [Y/N]:  Prior or Outpatient Records Reviewed [Y/N]: Preparation Of Recipient Site - Flap: The eschar was removed surgically with sharp dissection to facilitate appropriate wound healing of the following adjacent tissue rearrangement.

## 2023-03-17 ENCOUNTER — EMERGENCY (EMERGENCY)
Facility: HOSPITAL | Age: 68
LOS: 1 days | Discharge: HOME | End: 2023-03-17
Attending: EMERGENCY MEDICINE
Payer: MEDICARE

## 2023-03-17 VITALS
TEMPERATURE: 98 F | HEART RATE: 81 BPM | OXYGEN SATURATION: 95 % | SYSTOLIC BLOOD PRESSURE: 133 MMHG | RESPIRATION RATE: 16 BRPM | DIASTOLIC BLOOD PRESSURE: 80 MMHG

## 2023-03-17 VITALS
RESPIRATION RATE: 16 BRPM | HEART RATE: 84 BPM | DIASTOLIC BLOOD PRESSURE: 70 MMHG | HEIGHT: 65 IN | WEIGHT: 119.93 LBS | OXYGEN SATURATION: 100 % | SYSTOLIC BLOOD PRESSURE: 152 MMHG

## 2023-03-17 DIAGNOSIS — Z90.12 ACQUIRED ABSENCE OF LEFT BREAST AND NIPPLE: Chronic | ICD-10-CM

## 2023-03-17 LAB
ALBUMIN SERPL ELPH-MCNC: 3.9 G/DL — SIGNIFICANT CHANGE UP (ref 3.3–5)
ALP SERPL-CCNC: 78 U/L — SIGNIFICANT CHANGE UP (ref 40–120)
ALT FLD-CCNC: 10 U/L — SIGNIFICANT CHANGE UP (ref 10–45)
ANION GAP SERPL CALC-SCNC: 11 MMOL/L — SIGNIFICANT CHANGE UP (ref 5–17)
APTT BLD: 27.6 SEC — SIGNIFICANT CHANGE UP (ref 27.5–35.5)
AST SERPL-CCNC: 11 U/L — SIGNIFICANT CHANGE UP (ref 10–40)
BASOPHILS # BLD AUTO: 0.02 K/UL — SIGNIFICANT CHANGE UP (ref 0–0.2)
BASOPHILS NFR BLD AUTO: 0.3 % — SIGNIFICANT CHANGE UP (ref 0–2)
BILIRUB SERPL-MCNC: 0.5 MG/DL — SIGNIFICANT CHANGE UP (ref 0.2–1.2)
BUN SERPL-MCNC: 13 MG/DL — SIGNIFICANT CHANGE UP (ref 7–23)
CALCIUM SERPL-MCNC: 9.2 MG/DL — SIGNIFICANT CHANGE UP (ref 8.4–10.5)
CHLORIDE SERPL-SCNC: 105 MMOL/L — SIGNIFICANT CHANGE UP (ref 96–108)
CO2 SERPL-SCNC: 25 MMOL/L — SIGNIFICANT CHANGE UP (ref 22–31)
CREAT SERPL-MCNC: 0.52 MG/DL — SIGNIFICANT CHANGE UP (ref 0.5–1.3)
EGFR: 101 ML/MIN/1.73M2 — SIGNIFICANT CHANGE UP
EOSINOPHIL # BLD AUTO: 0.09 K/UL — SIGNIFICANT CHANGE UP (ref 0–0.5)
EOSINOPHIL NFR BLD AUTO: 1.1 % — SIGNIFICANT CHANGE UP (ref 0–6)
GLUCOSE SERPL-MCNC: 119 MG/DL — HIGH (ref 70–99)
HCT VFR BLD CALC: 41.6 % — SIGNIFICANT CHANGE UP (ref 34.5–45)
HGB BLD-MCNC: 13.8 G/DL — SIGNIFICANT CHANGE UP (ref 11.5–15.5)
IMM GRANULOCYTES NFR BLD AUTO: 0.4 % — SIGNIFICANT CHANGE UP (ref 0–0.9)
INR BLD: 1.05 RATIO — SIGNIFICANT CHANGE UP (ref 0.88–1.16)
LYMPHOCYTES # BLD AUTO: 1.76 K/UL — SIGNIFICANT CHANGE UP (ref 1–3.3)
LYMPHOCYTES # BLD AUTO: 22.4 % — SIGNIFICANT CHANGE UP (ref 13–44)
MCHC RBC-ENTMCNC: 28.9 PG — SIGNIFICANT CHANGE UP (ref 27–34)
MCHC RBC-ENTMCNC: 33.2 GM/DL — SIGNIFICANT CHANGE UP (ref 32–36)
MCV RBC AUTO: 87.2 FL — SIGNIFICANT CHANGE UP (ref 80–100)
MONOCYTES # BLD AUTO: 0.52 K/UL — SIGNIFICANT CHANGE UP (ref 0–0.9)
MONOCYTES NFR BLD AUTO: 6.6 % — SIGNIFICANT CHANGE UP (ref 2–14)
NEUTROPHILS # BLD AUTO: 5.45 K/UL — SIGNIFICANT CHANGE UP (ref 1.8–7.4)
NEUTROPHILS NFR BLD AUTO: 69.2 % — SIGNIFICANT CHANGE UP (ref 43–77)
NRBC # BLD: 0 /100 WBCS — SIGNIFICANT CHANGE UP (ref 0–0)
PLATELET # BLD AUTO: 216 K/UL — SIGNIFICANT CHANGE UP (ref 150–400)
POTASSIUM SERPL-MCNC: 4.1 MMOL/L — SIGNIFICANT CHANGE UP (ref 3.5–5.3)
POTASSIUM SERPL-SCNC: 4.1 MMOL/L — SIGNIFICANT CHANGE UP (ref 3.5–5.3)
PROT SERPL-MCNC: 7.3 G/DL — SIGNIFICANT CHANGE UP (ref 6–8.3)
PROTHROM AB SERPL-ACNC: 12.2 SEC — SIGNIFICANT CHANGE UP (ref 10.5–13.4)
RBC # BLD: 4.77 M/UL — SIGNIFICANT CHANGE UP (ref 3.8–5.2)
RBC # FLD: 14 % — SIGNIFICANT CHANGE UP (ref 10.3–14.5)
SODIUM SERPL-SCNC: 141 MMOL/L — SIGNIFICANT CHANGE UP (ref 135–145)
WBC # BLD: 7.87 K/UL — SIGNIFICANT CHANGE UP (ref 3.8–10.5)
WBC # FLD AUTO: 7.87 K/UL — SIGNIFICANT CHANGE UP (ref 3.8–10.5)

## 2023-03-17 PROCEDURE — 99285 EMERGENCY DEPT VISIT HI MDM: CPT | Mod: 25

## 2023-03-17 PROCEDURE — 70450 CT HEAD/BRAIN W/O DYE: CPT | Mod: 26,MA

## 2023-03-17 PROCEDURE — 12015 RPR F/E/E/N/L/M 7.6-12.5 CM: CPT

## 2023-03-17 PROCEDURE — 72125 CT NECK SPINE W/O DYE: CPT | Mod: 26,MA

## 2023-03-17 RX ORDER — LIDOCAINE HYDROCHLORIDE AND EPINEPHRINE 10; 10 MG/ML; UG/ML
5 INJECTION, SOLUTION INFILTRATION; PERINEURAL ONCE
Refills: 0 | Status: DISCONTINUED | OUTPATIENT
Start: 2023-03-17 | End: 2023-03-20

## 2023-03-17 NOTE — ED PROVIDER NOTE - CARE PROVIDER_API CALL
Silvina Herrera  INTERNAL MEDICINE  [address] [place] [address]  Phone: [phone]  Fax: [phone]  Follow Up Time: 4-6 Days

## 2023-03-17 NOTE — ED ADULT NURSE NOTE - INTERVENTIONS DEFINITIONS
[place] to call system/Call bell, personal items and telephone within reach/Non-slip footwear when patient is off stretcher/Physically safe environment: no spills, clutter or unnecessary equipment/Stretcher in lowest position, wheels locked, appropriate side rails in place/Provide visual cue, wrist band, yellow gown, etc./Monitor for mental status changes and reorient to person, place, and time/Reinforce activity limits and safety measures with patient and family/Provide visual clues: red socks

## 2023-03-17 NOTE — ED PROVIDER NOTE - CLINICAL SUMMARY MEDICAL DECISION MAKING FREE TEXT BOX
67-year-old female with history of breast cancer and left mastectomy, hypothyroidism, and aphasia presenting with mechanical fall this morning.  Patient had a witnessed fall by her  after she fell forward out of her wheelchair and hit her head onto a wood floor.   on scene denies any loss of consciousness, vomiting, seizure, or altered mental status worse than baseline.  911 was called and reports left eyebrow laceration with hemostasis achieved in the field.  No further interventions by EMS in route.  Patient takes levothyroxine and tamoxifen with no recent changes in doses.  Son is bedside with mother and confirms collateral. on exam as per son present at the bedside, mental status at baseline, CHRIS, 9 cm laceration over the left eyebrow, moving all 4 extremities, not able to ambulate patient is in a wheelchair at baseline. will r/o bleed, plan: CT head and neck, laceration repair, and reassess.

## 2023-03-17 NOTE — ED ADULT NURSE NOTE - OBJECTIVE STATEMENT
PT comes via EMS for fall and Left eyebrow laceration. Pt is WC bound at baseline and non-verbal, per son who is present with pt in the room. As per son pt was in the wc and fell out, unwitnessed. Per son pt is at her baseline. Pt has laceration to L eyebrow about 9cm long, bleeding is controlled now. Pt has hx of breast Ca on the L, balance problems and she is nonverbal since the previous fall. No other visible injuries.

## 2023-03-17 NOTE — ED PROVIDER NOTE - PHYSICAL EXAMINATION
Physical exam:   Gen: Well developed, NAD; non toxic appearing; answers yes/no to questions  HEENT: +LEFT eyebrow laceration superficial 9cm with hemostasis achieved; PERRL, moist mucous membranes  CVS: +S1, S2, RRR, no murmurs  Lungs: CTA b/l, no retractions/wheezes  Abdomen: soft, nontender/nondistended, +BS  Ext: no cyanosis/edema, cap refill < 2 seconds  Skin: no rashes or skin break down  Neuro: Awake/alert, no focal deficit  -Exam performed by Josh NAGEL, PGY6

## 2023-03-17 NOTE — ED PROVIDER NOTE - OBJECTIVE STATEMENT
67-year-old female with history of breast cancer and left mastectomy, hypothyroidism, and aphasia presenting with mechanical fall this morning.  Patient had a witnessed fall by her  after she fell forward out of her wheelchair and hit her head onto a wood floor.   on scene denies any loss of consciousness, vomiting, seizure, or altered mental status worse than baseline.  911 was called and reports left eyebrow laceration with hemostasis achieved in the field.  No further interventions by EMS in route.  Patient takes levothyroxine and tamoxifen with no recent changes in doses.  Son is bedside with mother and confirms collateral.

## 2023-03-17 NOTE — ED PROVIDER NOTE - ATTENDING CONTRIBUTION TO CARE
I performed a history and physical exam of the patient and discussed their management with the fellow. I reviewed the fellow's note and agree with the documented findings and plan of care.  Halima Fairbanks MD

## 2023-03-17 NOTE — ED PROVIDER NOTE - PATIENT PORTAL LINK FT
You can access the FollowMyHealth Patient Portal offered by [facility] by registering at the following website: [email or web address]. By joining [facility]’s FollowMyHealth portal, you will also be able to view your health information using other applications (apps) compatible with our system.

## 2023-09-18 ENCOUNTER — INPATIENT (INPATIENT)
Facility: HOSPITAL | Age: 68
LOS: 4 days | Discharge: HOME | DRG: 640 | End: 2023-09-23
Attending: STUDENT IN AN ORGANIZED HEALTH CARE EDUCATION/TRAINING PROGRAM | Admitting: STUDENT IN AN ORGANIZED HEALTH CARE EDUCATION/TRAINING PROGRAM
Payer: MEDICARE

## 2023-09-18 VITALS
RESPIRATION RATE: 18 BRPM | OXYGEN SATURATION: 97 % | HEIGHT: 66 IN | SYSTOLIC BLOOD PRESSURE: 132 MMHG | DIASTOLIC BLOOD PRESSURE: 85 MMHG | HEART RATE: 78 BPM | WEIGHT: 110.01 LBS

## 2023-09-18 DIAGNOSIS — Z90.12 ACQUIRED ABSENCE OF LEFT BREAST AND NIPPLE: Chronic | ICD-10-CM

## 2023-09-18 LAB
ALBUMIN SERPL ELPH-MCNC: 3.9 G/DL — SIGNIFICANT CHANGE UP (ref 3.3–5)
ALP SERPL-CCNC: 95 U/L — SIGNIFICANT CHANGE UP (ref 40–120)
ALT FLD-CCNC: 20 U/L — SIGNIFICANT CHANGE UP (ref 10–45)
ANION GAP SERPL CALC-SCNC: 10 MMOL/L — SIGNIFICANT CHANGE UP (ref 5–17)
APTT BLD: 27.4 SEC — SIGNIFICANT CHANGE UP (ref 24.5–35.6)
AST SERPL-CCNC: 18 U/L — SIGNIFICANT CHANGE UP (ref 10–40)
BASOPHILS # BLD AUTO: 0.01 K/UL — SIGNIFICANT CHANGE UP (ref 0–0.2)
BASOPHILS NFR BLD AUTO: 0.1 % — SIGNIFICANT CHANGE UP (ref 0–2)
BILIRUB SERPL-MCNC: 0.8 MG/DL — SIGNIFICANT CHANGE UP (ref 0.2–1.2)
BUN SERPL-MCNC: 18 MG/DL — SIGNIFICANT CHANGE UP (ref 7–23)
CALCIUM SERPL-MCNC: 8.8 MG/DL — SIGNIFICANT CHANGE UP (ref 8.4–10.5)
CHLORIDE SERPL-SCNC: 120 MMOL/L — HIGH (ref 96–108)
CO2 SERPL-SCNC: 27 MMOL/L — SIGNIFICANT CHANGE UP (ref 22–31)
CREAT SERPL-MCNC: 0.57 MG/DL — SIGNIFICANT CHANGE UP (ref 0.5–1.3)
EGFR: 98 ML/MIN/1.73M2 — SIGNIFICANT CHANGE UP
EOSINOPHIL # BLD AUTO: 0.03 K/UL — SIGNIFICANT CHANGE UP (ref 0–0.5)
EOSINOPHIL NFR BLD AUTO: 0.4 % — SIGNIFICANT CHANGE UP (ref 0–6)
GLUCOSE SERPL-MCNC: 125 MG/DL — HIGH (ref 70–99)
HCT VFR BLD CALC: 46.5 % — HIGH (ref 34.5–45)
HGB BLD-MCNC: 14.1 G/DL — SIGNIFICANT CHANGE UP (ref 11.5–15.5)
IMM GRANULOCYTES NFR BLD AUTO: 0.2 % — SIGNIFICANT CHANGE UP (ref 0–0.9)
INR BLD: 1.12 RATIO — SIGNIFICANT CHANGE UP (ref 0.85–1.18)
LYMPHOCYTES # BLD AUTO: 1.92 K/UL — SIGNIFICANT CHANGE UP (ref 1–3.3)
LYMPHOCYTES # BLD AUTO: 23.4 % — SIGNIFICANT CHANGE UP (ref 13–44)
MCHC RBC-ENTMCNC: 28.7 PG — SIGNIFICANT CHANGE UP (ref 27–34)
MCHC RBC-ENTMCNC: 30.3 GM/DL — LOW (ref 32–36)
MCV RBC AUTO: 94.7 FL — SIGNIFICANT CHANGE UP (ref 80–100)
MONOCYTES # BLD AUTO: 0.43 K/UL — SIGNIFICANT CHANGE UP (ref 0–0.9)
MONOCYTES NFR BLD AUTO: 5.2 % — SIGNIFICANT CHANGE UP (ref 2–14)
NEUTROPHILS # BLD AUTO: 5.8 K/UL — SIGNIFICANT CHANGE UP (ref 1.8–7.4)
NEUTROPHILS NFR BLD AUTO: 70.7 % — SIGNIFICANT CHANGE UP (ref 43–77)
NRBC # BLD: 0 /100 WBCS — SIGNIFICANT CHANGE UP (ref 0–0)
PLATELET # BLD AUTO: 157 K/UL — SIGNIFICANT CHANGE UP (ref 150–400)
POTASSIUM SERPL-MCNC: 4 MMOL/L — SIGNIFICANT CHANGE UP (ref 3.5–5.3)
POTASSIUM SERPL-SCNC: 4 MMOL/L — SIGNIFICANT CHANGE UP (ref 3.5–5.3)
PROT SERPL-MCNC: 6.7 G/DL — SIGNIFICANT CHANGE UP (ref 6–8.3)
PROTHROM AB SERPL-ACNC: 11.7 SEC — SIGNIFICANT CHANGE UP (ref 9.5–13)
RBC # BLD: 4.91 M/UL — SIGNIFICANT CHANGE UP (ref 3.8–5.2)
RBC # FLD: 14.5 % — SIGNIFICANT CHANGE UP (ref 10.3–14.5)
SODIUM SERPL-SCNC: 157 MMOL/L — HIGH (ref 135–145)
WBC # BLD: 8.21 K/UL — SIGNIFICANT CHANGE UP (ref 3.8–10.5)
WBC # FLD AUTO: 8.21 K/UL — SIGNIFICANT CHANGE UP (ref 3.8–10.5)

## 2023-09-18 PROCEDURE — 99285 EMERGENCY DEPT VISIT HI MDM: CPT

## 2023-09-18 RX ORDER — TETANUS TOXOID, REDUCED DIPHTHERIA TOXOID AND ACELLULAR PERTUSSIS VACCINE, ADSORBED 5; 2.5; 8; 8; 2.5 [IU]/.5ML; [IU]/.5ML; UG/.5ML; UG/.5ML; UG/.5ML
0.5 SUSPENSION INTRAMUSCULAR ONCE
Refills: 0 | Status: COMPLETED | OUTPATIENT
Start: 2023-09-18 | End: 2023-09-18

## 2023-09-18 RX ADMIN — TETANUS TOXOID, REDUCED DIPHTHERIA TOXOID AND ACELLULAR PERTUSSIS VACCINE, ADSORBED 0.5 MILLILITER(S): 5; 2.5; 8; 8; 2.5 SUSPENSION INTRAMUSCULAR at 21:15

## 2023-09-18 NOTE — ED PROVIDER NOTE - ATTENDING CONTRIBUTION TO CARE
68 y f pmh TBI aphaisa/Wheelchair bound, Breast ca, hypothyrodism. PT at baseline MS according to SON/.  Tonight had nails clippe by  in dark room and sustained lac to finger tips. No other complaint. PE [place] female eyes open non verbal. Chest clear abd soft +superfical avuslions to finger 2,4,5 rt no active bleeding  Kelvin Jaime MD, Facep 68 y f pmh TBI aphaisa/Wheelchair bound, Breast ca, hypothyrodism. PT at baseline MS according to SON/.  Tonight had nails clippe by  in dark room and sustained lac to finger tips. No other complaint. PE [place]el[place] female eyes open non verbal. Chest clear abd soft +superfical avuslions to finger 2,4,5 left no active bleeding  Kelvin Jaime MD, Facep

## 2023-09-18 NOTE — ED PROVIDER NOTE - CLINICAL SUMMARY MEDICAL DECISION MAKING FREE TEXT BOX
68 female past medical history TBI 2 years ago complicated by aphasia and wheelchair-bound, breast cancer on tamoxifen, hypothyroidism, no AC use presenting with right finger lacerations after cutting nails. Will give tetanus vaccine and bandage with surgicel. Will re-assess. Plan to dc.

## 2023-09-18 NOTE — ED ADULT NURSE NOTE - OBJECTIVE STATEMENT
Pt is a 68y F brought in by ambulance, accompanied by family member for finger laceration s/p clipping nails. Pt has aphasia from prior stroke. Per family pt is currently at baseline mental status.

## 2023-09-18 NOTE — ED PROVIDER NOTE - PHYSICAL EXAMINATION
PHYSICAL EXAM:  GENERAL: Sitting comfortable in bed, in no acute distress  HENMT: Atraumatic, moist mucous membranes   EYES: Clear bilaterally, PERRL, EOMs intact b/l  HEART: Regular rate and regular rhythm, S1/S2, no murmur  RESPIRATORY: Clear to auscultation bilaterally, no wheezes/rhonchi/rales  ABDOMEN: Soft, nontender, nondistended  EXTREMITIES: R 2nd, 4th, and 5th finger tip lac, +2 radial pulses b/l  NEURO: A&Ox0, nonverbal PHYSICAL EXAM:  GENERAL: Sitting comfortable in bed, in no acute distress  HENMT: Atraumatic, dry mucous membranes   EYES: Clear bilaterally, PERRL, EOMs intact b/l  HEART: Regular rate and regular rhythm, S1/S2, no murmur  RESPIRATORY: Clear to auscultation bilaterally, no wheezes/rhonchi/rales  ABDOMEN: Soft, nontender, nondistended  EXTREMITIES: R 2nd, 4th, and 5th finger tip lac, +2 radial pulses b/l  NEURO: A&Ox0, nonverbal

## 2023-09-18 NOTE — ED ADULT NURSE NOTE - NSFALLHARMRISKINTERV_ED_ALL_ED

## 2023-09-19 DIAGNOSIS — R13.10 DYSPHAGIA, UNSPECIFIED: ICD-10-CM

## 2023-09-19 DIAGNOSIS — E03.9 HYPOTHYROIDISM, UNSPECIFIED: ICD-10-CM

## 2023-09-19 DIAGNOSIS — G93.9 DISORDER OF BRAIN, UNSPECIFIED: ICD-10-CM

## 2023-09-19 DIAGNOSIS — N39.0 URINARY TRACT INFECTION, SITE NOT SPECIFIED: ICD-10-CM

## 2023-09-19 DIAGNOSIS — Z29.9 ENCOUNTER FOR PROPHYLACTIC MEASURES, UNSPECIFIED: ICD-10-CM

## 2023-09-19 DIAGNOSIS — E87.0 HYPEROSMOLALITY AND HYPERNATREMIA: ICD-10-CM

## 2023-09-19 DIAGNOSIS — C50.919 MALIGNANT NEOPLASM OF UNSPECIFIED SITE OF UNSPECIFIED FEMALE BREAST: ICD-10-CM

## 2023-09-19 LAB
A1C WITH ESTIMATED AVERAGE GLUCOSE RESULT: 5.7 % — HIGH (ref 4–5.6)
ALBUMIN SERPL ELPH-MCNC: 3.3 G/DL — SIGNIFICANT CHANGE UP (ref 3.3–5)
ALBUMIN SERPL ELPH-MCNC: 3.4 G/DL — SIGNIFICANT CHANGE UP (ref 3.3–5)
ALP SERPL-CCNC: 77 U/L — SIGNIFICANT CHANGE UP (ref 40–120)
ALP SERPL-CCNC: 81 U/L — SIGNIFICANT CHANGE UP (ref 40–120)
ALT FLD-CCNC: 12 U/L — SIGNIFICANT CHANGE UP (ref 10–45)
ALT FLD-CCNC: 14 U/L — SIGNIFICANT CHANGE UP (ref 10–45)
ANION GAP SERPL CALC-SCNC: 10 MMOL/L — SIGNIFICANT CHANGE UP (ref 5–17)
ANION GAP SERPL CALC-SCNC: 12 MMOL/L — SIGNIFICANT CHANGE UP (ref 5–17)
APPEARANCE UR: ABNORMAL
AST SERPL-CCNC: 15 U/L — SIGNIFICANT CHANGE UP (ref 10–40)
AST SERPL-CCNC: 9 U/L — LOW (ref 10–40)
BACTERIA # UR AUTO: ABNORMAL
BILIRUB SERPL-MCNC: 0.7 MG/DL — SIGNIFICANT CHANGE UP (ref 0.2–1.2)
BILIRUB SERPL-MCNC: 0.9 MG/DL — SIGNIFICANT CHANGE UP (ref 0.2–1.2)
BILIRUB UR-MCNC: NEGATIVE — SIGNIFICANT CHANGE UP
BUN SERPL-MCNC: 13 MG/DL — SIGNIFICANT CHANGE UP (ref 7–23)
BUN SERPL-MCNC: 14 MG/DL — SIGNIFICANT CHANGE UP (ref 7–23)
CALCIUM SERPL-MCNC: 8.3 MG/DL — LOW (ref 8.4–10.5)
CALCIUM SERPL-MCNC: 8.3 MG/DL — LOW (ref 8.4–10.5)
CHLORIDE SERPL-SCNC: 113 MMOL/L — HIGH (ref 96–108)
CHLORIDE SERPL-SCNC: 117 MMOL/L — HIGH (ref 96–108)
CHOLEST SERPL-MCNC: 145 MG/DL — SIGNIFICANT CHANGE UP
CO2 SERPL-SCNC: 25 MMOL/L — SIGNIFICANT CHANGE UP (ref 22–31)
CO2 SERPL-SCNC: 26 MMOL/L — SIGNIFICANT CHANGE UP (ref 22–31)
COLOR SPEC: YELLOW — SIGNIFICANT CHANGE UP
CREAT ?TM UR-MCNC: 133 MG/DL — SIGNIFICANT CHANGE UP
CREAT SERPL-MCNC: 0.49 MG/DL — LOW (ref 0.5–1.3)
CREAT SERPL-MCNC: 0.5 MG/DL — SIGNIFICANT CHANGE UP (ref 0.5–1.3)
DIFF PNL FLD: ABNORMAL
EGFR: 101 ML/MIN/1.73M2 — SIGNIFICANT CHANGE UP
EGFR: 101 ML/MIN/1.73M2 — SIGNIFICANT CHANGE UP
EPI CELLS # UR: 1 /HPF — SIGNIFICANT CHANGE UP
ESTIMATED AVERAGE GLUCOSE: 117 MG/DL — HIGH (ref 68–114)
GLUCOSE SERPL-MCNC: 111 MG/DL — HIGH (ref 70–99)
GLUCOSE SERPL-MCNC: 203 MG/DL — HIGH (ref 70–99)
GLUCOSE UR QL: NEGATIVE — SIGNIFICANT CHANGE UP
HCT VFR BLD CALC: 38.1 % — SIGNIFICANT CHANGE UP (ref 34.5–45)
HDLC SERPL-MCNC: 37 MG/DL — LOW
HGB BLD-MCNC: 12 G/DL — SIGNIFICANT CHANGE UP (ref 11.5–15.5)
HYALINE CASTS # UR AUTO: 10 /LPF — HIGH (ref 0–2)
KETONES UR-MCNC: NEGATIVE — SIGNIFICANT CHANGE UP
LEUKOCYTE ESTERASE UR-ACNC: ABNORMAL
LIPID PNL WITH DIRECT LDL SERPL: 86 MG/DL — SIGNIFICANT CHANGE UP
MAGNESIUM SERPL-MCNC: 2.2 MG/DL — SIGNIFICANT CHANGE UP (ref 1.6–2.6)
MCHC RBC-ENTMCNC: 29.3 PG — SIGNIFICANT CHANGE UP (ref 27–34)
MCHC RBC-ENTMCNC: 31.5 GM/DL — LOW (ref 32–36)
MCV RBC AUTO: 92.9 FL — SIGNIFICANT CHANGE UP (ref 80–100)
NITRITE UR-MCNC: POSITIVE
NON HDL CHOLESTEROL: 108 MG/DL — SIGNIFICANT CHANGE UP
NRBC # BLD: 0 /100 WBCS — SIGNIFICANT CHANGE UP (ref 0–0)
OSMOLALITY SERPL: 322 MOSMOL/KG — HIGH (ref 280–301)
OSMOLALITY UR: 879 MOS/KG — SIGNIFICANT CHANGE UP (ref 300–900)
PH UR: 6 — SIGNIFICANT CHANGE UP (ref 5–8)
PHOSPHATE SERPL-MCNC: 2.4 MG/DL — LOW (ref 2.5–4.5)
PLATELET # BLD AUTO: 139 K/UL — LOW (ref 150–400)
POTASSIUM SERPL-MCNC: 3.2 MMOL/L — LOW (ref 3.5–5.3)
POTASSIUM SERPL-MCNC: 3.5 MMOL/L — SIGNIFICANT CHANGE UP (ref 3.5–5.3)
POTASSIUM SERPL-SCNC: 3.2 MMOL/L — LOW (ref 3.5–5.3)
POTASSIUM SERPL-SCNC: 3.5 MMOL/L — SIGNIFICANT CHANGE UP (ref 3.5–5.3)
PROT SERPL-MCNC: 5.7 G/DL — LOW (ref 6–8.3)
PROT SERPL-MCNC: 5.8 G/DL — LOW (ref 6–8.3)
PROT UR-MCNC: ABNORMAL
RBC # BLD: 4.1 M/UL — SIGNIFICANT CHANGE UP (ref 3.8–5.2)
RBC # FLD: 14.3 % — SIGNIFICANT CHANGE UP (ref 10.3–14.5)
RBC CASTS # UR COMP ASSIST: 2 /HPF — SIGNIFICANT CHANGE UP (ref 0–4)
SODIUM SERPL-SCNC: 149 MMOL/L — HIGH (ref 135–145)
SODIUM SERPL-SCNC: 154 MMOL/L — HIGH (ref 135–145)
SODIUM UR-SCNC: 159 MMOL/L — SIGNIFICANT CHANGE UP
SP GR SPEC: 1.03 — HIGH (ref 1.01–1.02)
TRIGL SERPL-MCNC: 124 MG/DL — SIGNIFICANT CHANGE UP
TSH SERPL-MCNC: 3.8 UIU/ML — SIGNIFICANT CHANGE UP (ref 0.27–4.2)
UROBILINOGEN FLD QL: NEGATIVE — SIGNIFICANT CHANGE UP
WBC # BLD: 6.62 K/UL — SIGNIFICANT CHANGE UP (ref 3.8–10.5)
WBC # FLD AUTO: 6.62 K/UL — SIGNIFICANT CHANGE UP (ref 3.8–10.5)
WBC UR QL: 45 /HPF — HIGH (ref 0–5)

## 2023-09-19 PROCEDURE — 99223 1ST HOSP IP/OBS HIGH 75: CPT | Mod: GC

## 2023-09-19 PROCEDURE — 70450 CT HEAD/BRAIN W/O DYE: CPT | Mod: 26

## 2023-09-19 RX ORDER — SODIUM CHLORIDE 9 MG/ML
1000 INJECTION, SOLUTION INTRAVENOUS
Refills: 0 | Status: DISCONTINUED | OUTPATIENT
Start: 2023-09-19 | End: 2023-09-19

## 2023-09-19 RX ORDER — SODIUM CHLORIDE 9 MG/ML
1000 INJECTION, SOLUTION INTRAVENOUS ONCE
Refills: 0 | Status: COMPLETED | OUTPATIENT
Start: 2023-09-19 | End: 2023-09-19

## 2023-09-19 RX ORDER — SODIUM CHLORIDE 9 MG/ML
1000 INJECTION, SOLUTION INTRAVENOUS
Refills: 0 | Status: DISCONTINUED | OUTPATIENT
Start: 2023-09-19 | End: 2023-09-20

## 2023-09-19 RX ORDER — ENOXAPARIN SODIUM 100 MG/ML
40 INJECTION SUBCUTANEOUS EVERY 24 HOURS
Refills: 0 | Status: DISCONTINUED | OUTPATIENT
Start: 2023-09-19 | End: 2023-09-23

## 2023-09-19 RX ORDER — CEFTRIAXONE 500 MG/1
1000 INJECTION, POWDER, FOR SOLUTION INTRAMUSCULAR; INTRAVENOUS EVERY 24 HOURS
Refills: 0 | Status: DISCONTINUED | OUTPATIENT
Start: 2023-09-19 | End: 2023-09-20

## 2023-09-19 RX ORDER — TAMOXIFEN CITRATE 20 MG/1
20 TABLET, FILM COATED ORAL DAILY
Refills: 0 | Status: DISCONTINUED | OUTPATIENT
Start: 2023-09-19 | End: 2023-09-23

## 2023-09-19 RX ORDER — LEVOTHYROXINE SODIUM 125 MCG
112 TABLET ORAL DAILY
Refills: 0 | Status: DISCONTINUED | OUTPATIENT
Start: 2023-09-19 | End: 2023-09-23

## 2023-09-19 RX ORDER — FOLIC ACID 0.8 MG
1 TABLET ORAL DAILY
Refills: 0 | Status: DISCONTINUED | OUTPATIENT
Start: 2023-09-19 | End: 2023-09-23

## 2023-09-19 RX ADMIN — ENOXAPARIN SODIUM 40 MILLIGRAM(S): 100 INJECTION SUBCUTANEOUS at 07:01

## 2023-09-19 RX ADMIN — TAMOXIFEN CITRATE 20 MILLIGRAM(S): 20 TABLET, FILM COATED ORAL at 15:43

## 2023-09-19 RX ADMIN — SODIUM CHLORIDE 1000 MILLILITER(S): 9 INJECTION, SOLUTION INTRAVENOUS at 00:51

## 2023-09-19 RX ADMIN — SODIUM CHLORIDE 50 MILLILITER(S): 9 INJECTION, SOLUTION INTRAVENOUS at 15:46

## 2023-09-19 RX ADMIN — SODIUM CHLORIDE 50 MILLILITER(S): 9 INJECTION, SOLUTION INTRAVENOUS at 08:23

## 2023-09-19 RX ADMIN — CEFTRIAXONE 100 MILLIGRAM(S): 500 INJECTION, POWDER, FOR SOLUTION INTRAMUSCULAR; INTRAVENOUS at 07:57

## 2023-09-19 RX ADMIN — SODIUM CHLORIDE 50 MILLILITER(S): 9 INJECTION, SOLUTION INTRAVENOUS at 06:41

## 2023-09-19 NOTE — PROGRESS NOTE ADULT - PROBLEM SELECTOR PLAN 1
Found to be hyperNa to 157 this admission. Likely in setting of poor PO intake in the last month prior to admission. Urine studies FeNa 0.4%: pre-renal     - s/p 1L LR in ED  - will give 50cc/h LR x24h and CTM Na     - may consider nephro c/s if Na not improving with more time on IVF   - f/u S/s eval for determination of pt capacity to swallow food vs. necessity of PEG -  aware of this possibility and amendable Found to be hyperNa to 157 this admission. Likely in setting of poor PO intake in the last month prior to admission.   - s/p 1L LR in ED  - C/w 50cc/h LR  - Na 154 (9/19)   - Trend BMPs

## 2023-09-19 NOTE — H&P ADULT - HISTORY OF PRESENT ILLNESS
This is a 67yo/F w/ PMHx breast cancer s/p mastectomy now on tamoxifen, aphasia s/p TBI, meningioma, hypothyroidism (hx of Grave's now s/p radioactive iodine) who presented to hospital for fingernail laceration.  was cutting her nails in the dark when it was found that she was bleeding from her nailbeds. In the ED, was given Tdap and did prelim labs. Labs significant for hypernatremia to 157, so admitted to medicine. Per ,  This is a 67yo/F w/ PMHx breast cancer s/p mastectomy now on tamoxifen, aphasia s/p TBI, meningioma, hypothyroidism (hx of Grave's now s/p radioactive iodine) who presented to hospital for fingernail laceration.  was cutting her nails in the dark when it was found that she was bleeding from her nailbeds. In the ED, was given Tdap and did prelim labs. Labs significant for hypernatremia to 157, so admitted to medicine. Per , pt has appeared more tired and had difficulty swallowing (and pocketing food) more in the last month. As a result, pt hasn't osmin much po intake in the last month. However, she hasn't made any complaints to him: no abd pain, no dysuria.     In the ED, she was given Tdap for the cut fingernails. Labs were significant for Na 157. Admitted to medicine for further workup.

## 2023-09-19 NOTE — PATIENT PROFILE ADULT - NSPROGENPREVTRANSF_GEN_A_NUR
no history of blood product transfusion Keystone Flap Text: The defect edges were debeveled with a #15 scalpel blade.  Given the location of the defect, shape of the defect a keystone flap was deemed most appropriate.  Using a sterile surgical marker, an appropriate keystone flap was drawn incorporating the defect, outlining the appropriate donor tissue and placing the expected incisions within the relaxed skin tension lines where possible. The area thus outlined was incised deep to adipose tissue with a #15 scalpel blade.  The skin margins were undermined to an appropriate distance in all directions around the primary defect and laterally outward around the flap utilizing iris scissors.

## 2023-09-19 NOTE — PHYSICAL THERAPY INITIAL EVALUATION ADULT - IMPAIRMENTS CONTRIBUTING IMPAIRED BED MOBILITY, REHAB EVAL
impaired balance/decreased flexibility/abnormal muscle tone/impaired postural control/decreased strength

## 2023-09-19 NOTE — H&P ADULT - ATTENDING COMMENTS
68F w/ hx of breast cancer s/p mastectomy (on tamoxifen), TBI c/b aphasia, wheelchair-bound, ?meningioma, acquired hypothyroidism 2/2 Graves disease s/p radioactive iodine, SVT, initially presenting with fingernail laceration after  accidentally cut fingers while clipping her nails in the dark.  (Ophthalmologist Dr. Silverio Arthur) had reported pt seemed to have increased fatigue and dysphagia/poor PO intake in the past month; otherwise was at baseline. Labs incidentally found Na 157, Cl 120. UA suggestive of UTI. VSS, afebrile. Exam limited due to aphasia though pt was able to answer some questions slowly (e.g. "No" when asked if having any pain, "Yelitza" when asked name); horizontal nystagmus, and dry oral mucosa noted. Overall, suspect hypernatremia 2/2 dehydration/poor PO intake in setting of UTI    #Hypovolemic hypernatremia  #UTI  #Hx of TBI c/b aphasia, wheelchair-bound  #Hypothyroidism  #Hx of Breast cancer    - c/w empiric CTX for UTI  - c/w home meds  - s/p 1L LR in ED, can c/w slow isotonic IVF for now, but consider switch to D5W pending repeat Na  - monitor BMP closely, avoid rapid correction of Na (max 6-8 mEq over 24 hrs)  - monitor mental status, at baseline per family; if any changes STAT CTH  - nutrition consult  - f/u UCx, TSH  - Nephrology consult if sodium not improving appropriately  - Rest of care as per plan above 68F w/ hx of breast cancer s/p mastectomy (on tamoxifen), TBI c/b aphasia, wheelchair-bound, ?meningioma, acquired hypothyroidism 2/2 Graves disease s/p radioactive iodine, SVT, initially presenting with fingernail laceration after  accidentally cut fingers while clipping her nails in the dark.  (Ophthalmologist Dr. Silverio Arthur) had reported pt seemed to have increased fatigue and dysphagia/poor PO intake in the past month; otherwise was at baseline. Labs incidentally found Na 157, Cl 120. UA suggestive of UTI. VSS, afebrile. Exam limited due to aphasia though pt was able to answer some questions slowly (e.g. "No" when asked if having any pain, "Yelitza" when asked name); horizontal nystagmus, and dry oral mucosa noted. Overall, suspect hypernatremia 2/2 dehydration/poor PO intake in setting of UTI    #Hypovolemic hypernatremia  #UTI  #Hx of TBI c/b aphasia, wheelchair-bound  #Hypothyroidism  #Hx of Breast cancer    - c/w empiric CTX for UTI  - c/w home meds  - s/p 1L LR in ED, can c/w slow isotonic IVF for volume repletion for now, but consider switch to D5W pending repeat Na  - monitor BMP closely, avoid rapid correction of Na (max 6-8 mEq over 24 hrs)  - monitor mental status, at baseline per family; if any changes STAT CTH  - nutrition consult  - f/u UCx, TSH  - Nephrology consult if sodium not improving appropriately  - Rest of care as per plan above 68F w/ hx of breast cancer s/p mastectomy (on tamoxifen), TBI c/b aphasia, wheelchair-bound, ?meningioma, acquired hypothyroidism 2/2 Graves disease s/p radioactive iodine, SVT, initially presenting with fingernail laceration after  accidentally cut fingers while clipping her nails in the dark.  (Ophthalmologist Dr. Silverio Arthur) had reported pt seemed to have increased fatigue and dysphagia/poor PO intake in the past month; otherwise was at baseline. Labs incidentally found Na 157, Cl 120. UA suggestive of UTI. VSS, afebrile. Exam limited due to aphasia though pt was able to answer some questions slowly (e.g. "No" when asked if having any pain, "Yelitza" when asked name); horizontal nystagmus, and dry oral mucosa noted. Overall, suspect hypernatremia 2/2 dehydration/poor PO intake in setting of UTI    #Hypovolemic hypernatremia  #UTI  #Hx of TBI c/b aphasia, wheelchair-bound  #Hypothyroidism  #Hx of Breast cancer    - c/w empiric CTX for UTI  - c/w home meds  - s/p 1L LR in ED, can c/w slow isotonic IVF for volume repletion for now, but consider switch to D5W pending repeat Na  - monitor BMP closely, avoid rapid correction of Na (max 6-8 mEq over 24 hrs)  - monitor mental status, at baseline per family; if any changes STAT CTH  - nutrition consult  - f/u UCx, TSH  - dysphagia screen, S+S eval  - Nephrology consult if sodium not improving appropriately  - Rest of care as per plan above

## 2023-09-19 NOTE — PATIENT PROFILE ADULT - INTERNATIONAL TRAVEL
Discharge Instructions       PATIENT ID: Michelle Louie  MRN: [id number]   YOB: 1957    DATE OF ADMISSION: 7/7/2017  1:24 AM    DATE OF DISCHARGE: 7/8/2017    PRIMARY CARE PROVIDER: Charleen Nunez MD     ATTENDING PHYSICIAN: Marissa Reilly MD  DISCHARGING PROVIDER: Marissa Reilly MD    To contact this individual call [phone] and ask the  to page. If unavailable ask to be transferred the Adult Hospitalist Department. DISCHARGE DIAGNOSES   Frontal mass    CONSULTATIONS: IP CONSULT TO NEUROSURGERY    PROCEDURES/SURGERIES: * No surgery found *    PENDING TEST RESULTS:   At the time of discharge the following test results are still pending: none      FOLLOW UP APPOINTMENTS:   Follow-up Information     Follow up With Details Comments Contact Info    Neurosurgery In 2 days             ADDITIONAL CARE RECOMMENDATIONS:   Follow up with Neurosurgery on Monday in 2 days    DIET: Regular Diet     ACTIVITY: Activity as tolerated      DISCHARGE MEDICATIONS:   See Medication Reconciliation Form    · It is important that you take the medication exactly as they are prescribed. · Keep your medication in the bottles provided by the pharmacist and keep a list of the medication names, dosages, and times to be taken in your wallet. · Do not take other medications without consulting your doctor. NOTIFY YOUR PHYSICIAN FOR ANY OF THE FOLLOWING:   Fever over 101 degrees for 24 hours. Chest pain, shortness of breath, fever, chills, nausea, vomiting, diarrhea, change in mentation, falling, weakness, bleeding. Severe pain or pain not relieved by medications. Or, any other signs or symptoms that you may have questions about.       DISPOSITION:  x  Home With:   OT  PT  HH  RN       SNF/Inpatient Rehab/LTAC    Independent/assisted living    Hospice    Other:     CDMP Checked:   Yes x     PROBLEM LIST Updated:  Yes x       Signed:   Marissa Reilly MD  7/8/2017  11:39 AM
No

## 2023-09-19 NOTE — H&P ADULT - NSHPLABSRESULTS_GEN_ALL_CORE
LABS:                          14.1   8.21  )-----------( 157      ( 18 Sep 2023 22:19 )             46.5     18    157<H>  |  120<H>  |  18  ----------------------------<  125<H>  4.0   |  27  |  0.57    Ca    8.8      18 Sep 2023 22:19    TPro  6.7  /  Alb  3.9  /  TBili  0.8  /  DBili  x   /  AST  18  /  ALT  20  /  AlkPhos  95  18    LIVER FUNCTIONS - ( 18 Sep 2023 22:19 )  Alb: 3.9 g/dL / Pro: 6.7 g/dL / ALK PHOS: 95 U/L / ALT: 20 U/L / AST: 18 U/L / GGT: x           PT/INR - ( 18 Sep 2023 22:19 )   PT: 11.7 sec;   INR: 1.12 ratio         PTT - ( 18 Sep 2023 22:19 )  PTT:27.4 sec          Urinalysis Basic - ( 19 Sep 2023 01:51 )    Color: Yellow / Appearance: Slightly Turbid / S.026 / pH: x  Gluc: x / Ketone: Negative  / Bili: Negative / Urobili: Negative   Blood: x / Protein: Trace / Nitrite: Positive   Leuk Esterase: Large / RBC: 2 /hpf / WBC 45 /HPF   Sq Epi: x / Non Sq Epi: x / Bacteria: Many LABS:                          14.1   8.21  )-----------( 157      ( 18 Sep 2023 22:19 )             46.5         157<H>  |  120<H>  |  18  ----------------------------<  125<H>  4.0   |  27  |  0.57    Ca    8.8      18 Sep 2023 22:19    TPro  6.7  /  Alb  3.9  /  TBili  0.8  /  DBili  x   /  AST  18  /  ALT  20  /  AlkPhos  95      LIVER FUNCTIONS - ( 18 Sep 2023 22:19 )  Alb: 3.9 g/dL / Pro: 6.7 g/dL / ALK PHOS: 95 U/L / ALT: 20 U/L / AST: 18 U/L / GGT: x           PT/INR - ( 18 Sep 2023 22:19 )   PT: 11.7 sec;   INR: 1.12 ratio         PTT - ( 18 Sep 2023 22:19 )  PTT:27.4 sec      Urinalysis Basic - ( 19 Sep 2023 01:51 )    Color: Yellow / Appearance: Slightly Turbid / S.026 / pH: x  Gluc: x / Ketone: Negative  / Bili: Negative / Urobili: Negative   Blood: x / Protein: Trace / Nitrite: Positive   Leuk Esterase: Large / RBC: 2 /hpf / WBC 45 /HPF   Sq Epi: x / Non Sq Epi: x / Bacteria: Many    EKG (23) personally reviewed: NSR, HR 74, QTc 435, no overt ST elevations/depressions, but interpretation limited by baseline artifacts

## 2023-09-19 NOTE — SWALLOW BEDSIDE ASSESSMENT ADULT - SWALLOW EVAL: DIAGNOSIS
53 year old white female referred by Dr. Hill for screening colonoscopy.  She has not had any prior colon evaluation.  There is no family history of colon cancer or polyps that places her at higher risk.  She has history of multiple sclerosis. Pt presented to hospital for fingernail laceration. Noted difficulty swallowing (and pocketing food) more over the last month. Dysphagia present in presence of TBI history w/ oropharyngeal swallow profile marked by prolonged manipulation, reduced control of bolus and latency in the swallow trigger 2/2 audible deglutition leading to overt s/sx of aspiration however with strategies implemented notable improvement in tolerance. Can not r/o silent aspiration at the bedside. Encourage conservative diet with use of strategies as outlined below to improve swallow function and efficiency.

## 2023-09-19 NOTE — PROGRESS NOTE ADULT - PROBLEM SELECTOR PLAN 3
S/p TBI with resultant aphasia. Per , eats regular food at baseline    - f/u PT eval  - f/u S/S eval Pt w/ decreased PO intake over past several weeks due to dysphagia. Per , eats regular food at baseline  - F/u S/S eval for determination of pt capacity to swallow food vs. necessity of PEG

## 2023-09-19 NOTE — H&P ADULT - PROBLEM SELECTOR PLAN 2
(hypertension) 11/26/2012    COPD (chronic obstructive pulmonary disease) ([facility] [address] 11/26/2012    Anxiety and depression 11/26/2012     Overview Note:     Patient is currently on venlafaxine 150 mg daily for depression and anxiety. She feels like it is not as affective as she would like currently. She does not feel suicidal and is sleeping ok but has decreased motivation and less happy days. Patient is on the vent and off sedation. No response to painful stimuli. EEG No seizures     Anoxic encephalopathy  Hypokalemia  Anemia - stable  Acute on chronic Hypoxic hypercapneic resp failure  Chronic Metabolic alkalosis  Bilateral pleural effusions  VDRF  Leukocytosis - improved  Moderate AS  Morbid obesity  SADI  Smoker  Pulmonary edema  Out of Hospital Cardiac arrest  ? Aspiration pneumonia  Systolic CHF with EF of 54%  Grade III Diastolic dysfunction  Metabolic alkalosis  Moderate malnutrition  S/p Trach and PEG       1. Trach collar trials in the next 7 days as tolerated  2. Tube feeds once cleared by GI  3. Inhalers  4. Await LTAC  5. C/w present management  No follow-ups on file.     Electronically signed by Adelaida Pandya MD on 8/8/2020 at 11:18 AM Unclear of chronicity of UTI, but per , pt appeared more lethargic than baseline so will treat    - f/u Ucx  - CTX 9/19 -

## 2023-09-19 NOTE — H&P ADULT - PROBLEM SELECTOR PLAN 1
Found to be hyperNa to 157 this admission. Likely in setting of poor PO intake in the last month prior to admission. Urine studies FeNa 0.4%: pre-renal     - s/p 1L LR in ED  - will give 50cc/h LR x24h and CTM Na  - f/u S/s eval for determination of pt capacity to swallow food vs. necessity of PEG -  aware of this possibility and amendable Found to be hyperNa to 157 this admission. Likely in setting of poor PO intake in the last month prior to admission. Urine studies FeNa 0.4%: pre-renal     - s/p 1L LR in ED  - will give 50cc/h LR x24h and CTM Na     - may consider nephro c/s if Na not improving with more time on IVF   - f/u S/s eval for determination of pt capacity to swallow food vs. necessity of PEG -  aware of this possibility and amendable

## 2023-09-19 NOTE — H&P ADULT - PROBLEM SELECTOR PLAN 3
S/p TBI with resultant aphasia. Per , eats regular food at baseline    - f/u PT eval  - f/u S/S eval

## 2023-09-19 NOTE — H&P ADULT - TIME BILLING
reviewing prior documentation, reviewing available recent outpatient records, independently obtaining a history and interpreting results of tests, performing a physical examination, discussing plan with patient, reviewing tests/imaging, ordering medications/tests, documenting clinical information in the electronic health record, and coordinating care with staff.

## 2023-09-19 NOTE — PROGRESS NOTE ADULT - ASSESSMENT
This is a 69yo/F w/ PMHx breast cancer s/p mastectomy now on tamoxifen, aphasia s/p TBI, meningioma, hypothyroidism (hx of Grave's now s/p radioactive iodine) who presented to hospital for fingernail laceration. Found to be hypernatremic to 157, so admitted to medicine for further workup. This is a 67yo/F w/ PMHx breast cancer s/p mastectomy now on tamoxifen, aphasia s/p TBI, meningioma, hypothyroidism (hx of Grave's now s/p radioactive iodine) who presented to hospital for fingernail laceration. Found to be hypernatremic to 157

## 2023-09-19 NOTE — H&P ADULT - ASSESSMENT
This is a 67yo/F w/ PMHx breast cancer s/p mastectomy now on tamoxifen, aphasia s/p TBI, meningioma, hypothyroidism (hx of Grave's now s/p radioactive iodine) who presented to hospital for fingernail laceration. Found to be hypernatremic to 157, so admitted to medicine for further workup.

## 2023-09-19 NOTE — H&P ADULT - NSICDXPASTMEDICALHX_GEN_ALL_CORE_FT
PAST MEDICAL HISTORY:  Breast cancer     Hyperthyroidism 1975- s/p radio active iodine RX    Hypothyroidism      PAST MEDICAL HISTORY:  Breast cancer     Hyperthyroidism 1975- s/p radio active iodine RX    Hypothyroidism     Multiple falls     SAH (subarachnoid hemorrhage)     SVT (supraventricular tachycardia)

## 2023-09-19 NOTE — PHYSICAL THERAPY INITIAL EVALUATION ADULT - PASSIVE RANGE OF MOTION EXAMINATION, REHAB EVAL
inc flexion tone b/l UE unable to assess through full range/bilateral lower extremity Passive ROM was WFL (within functional limits)

## 2023-09-19 NOTE — PROGRESS NOTE ADULT - PROBLEM SELECTOR PLAN 7
DVT ppx: lovenox  Diet: NPO pending bedside dysphagia screen - if passes, then regular food until formal S/S eval  Dispo: f/u PT recs    Ethics: full code:  Silverio Arthur is the HCP (ophthalmologist)

## 2023-09-19 NOTE — H&P ADULT - PROBLEM SELECTOR PLAN 6
DVT ppx: lovenox  Diet: NPO pending bedside dysphagia screen - if passes, then regular food until formal S/S eval  Dispo: f/u PT recs DVT ppx: lovenox  Diet: NPO pending bedside dysphagia screen - if passes, then regular food until formal S/S eval  Dispo: f/u PT recs    Ethics: full code:  Silverio Arthur is the HCP (ophthalmologist)

## 2023-09-19 NOTE — SWALLOW BEDSIDE ASSESSMENT ADULT - COMMENTS
Continued history:   -In the ED, she was given Tdap for the cut fingernails.   -Labs were significant for Na 157.   -Admitted to medicine for further workup.   -Acute UTI    Speech & Swallow ; no reports in SCM.    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 Continued history:   -In the ED, she was given Tdap for the cut fingernails.   -Labs were significant for Na 157.   -Admitted to medicine for further workup.   -Acute UTI    Speech & Swallow ; no reports in SCM.  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 Continued history:   -In the ED, she was given Tdap for the cut fingernails.   -Labs were significant for Na 157.   -Admitted to medicine for further workup.   -Acute UTI    Speech & Swallow ; no reports in SCM.  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

## 2023-09-19 NOTE — H&P ADULT - NSHPPHYSICALEXAM_GEN_ALL_CORE
VITALS:   T(C): 36.4 (09-19-23 @ 03:18), Max: 36.6 (09-19-23 @ 01:00)  HR: 71 (09-19-23 @ 03:18) (71 - 78)  BP: 130/84 (09-19-23 @ 03:18) (130/84 - 137/87)  RR: 18 (09-19-23 @ 03:18) (18 - 18)  SpO2: 97% (09-19-23 @ 03:18) (96% - 97%)    GENERAL: NAD  HEAD:  Atraumatic, Normocephalic  EYES: conjunctiva and sclera clear;   ENT: dry mucous membranes with poor dentation; unable to open mouth on command, or with me trying to open it   CHEST/LUNG: Clear to auscultation bilaterally; No rales, rhonchi, wheezing, or rubs. Unlabored respirations  HEART: Regular rate and rhythm; No murmurs, rubs, or gallops  ABDOMEN: BSx4; Soft, nontender, nondistended  EXTREMITIES:: no edema in b/l LE  NERVOUS SYSTEM:  A&Ox0, aphasic per  but not answering question at bedside for me  SKIN: No rashes or lesions; fingers with bandaids over

## 2023-09-19 NOTE — PHYSICAL THERAPY INITIAL EVALUATION ADULT - ADDITIONAL COMMENTS
Per son: pt lives in private home with spouse. Several steps to enter which pt is able to do with assist. Pt gets to / from wheelchair with assist from son. Pt owns a hospital bed and transport wheelchair. Pt goes to outpatient PT, OT, and speech.

## 2023-09-19 NOTE — ED ADULT NURSE REASSESSMENT NOTE - NS ED NURSE REASSESS COMMENT FT1
Patient straight cathed for urine using sterile technique. Second RN present to confirm sterility. Explained procedure as it was being done - Pt tolerated procedure well. Sterile specimens collected and sent to lab as ordered. drained approximately 250 ml of urine. Comfort and safety provided.

## 2023-09-19 NOTE — PATIENT PROFILE ADULT - FALL HARM RISK - HARM RISK INTERVENTIONS
Communicate Risk of Fall with Harm to all staff/Reinforce activity limits and safety measures with patient and family/Tailored Fall Risk Interventions/Visual Cue: Yellow wristband and red socks/Bed in lowest position, wheels locked, appropriate side rails in place/Call bell, personal items and telephone in reach/Instruct patient to call for assistance before getting out of bed or chair/Non-slip footwear when patient is out of bed/[place] to call system/Physically safe environment - no spills, clutter or unnecessary equipment/Purposeful Proactive Rounding/Room/bathroom lighting operational, light cord in reach Assistance with ambulation/Assistance OOB with selected safe patient handling equipment/Communicate Risk of Fall with Harm to all staff/Discuss with provider need for PT consult/Monitor gait and stability/Provide patient with walking aids - walker, cane, crutches/Reinforce activity limits and safety measures with patient and family/Tailored Fall Risk Interventions/Visual Cue: Yellow wristband and red socks/Bed in lowest position, wheels locked, appropriate side rails in place/Call bell, personal items and telephone in reach/Instruct patient to call for assistance before getting out of bed or chair/Non-slip footwear when patient is out of bed/[place] to call system/Physically safe environment - no spills, clutter or unnecessary equipment/Purposeful Proactive Rounding/Room/bathroom lighting operational, light cord in reach

## 2023-09-19 NOTE — PHYSICAL THERAPY INITIAL EVALUATION ADULT - PERTINENT HX OF CURRENT PROBLEM, REHAB EVAL
67yo/F w/ PMHx breast cancer s/p mastectomy now on tamoxifen, aphasia s/p TBI, meningioma, hypothyroidism (hx of Grave's now s/p radioactive iodine) who presented to hospital for fingernail laceration.  was cutting her nails in the dark when it was found that she was bleeding from her nailbeds. In the ED, was given Tdap and did prelim labs. Labs significant for hypernatremia to 157, so admitted to medicine. Per , pt has appeared more tired and had difficulty swallowing (and pocketing food) more in the last month. As a result, pt hasn't osmin much po intake in the last month. However, she hasn't made any complaints to him: no abd pain, no dysuria. Found to be hypernatremic to 157.

## 2023-09-19 NOTE — SWALLOW BEDSIDE ASSESSMENT ADULT - SLP GENERAL OBSERVATIONS
Encountered in bed, resting, however easily awoke when lights were turned on. RA. NAD. non verbal Encountered in bed, resting, however easily awoke when lights were turned on. RA. NAD. non verbal. Calm.

## 2023-09-19 NOTE — PROGRESS NOTE ADULT - SUBJECTIVE AND OBJECTIVE BOX
*******************************  Eleazar Miller MD (PGY-1)  Internal Medicine  Contact via [employer] TEAMS  *******************************    PROGRESS NOTE:     Patient is a 68y old  Female who presents with a chief complaint of hypernatremia (19 Sep 2023 02:24)      INTERVAL EVENTS: No acute overnight events.     SUBJECTIVE: Patient seen and examined at bedside. This morning, the patient is comfortable and doing well. No acute complaints. Denies fevers, chills, N/V/D, chest pain, SOB, abdominal pain.    MEDICATIONS  (STANDING):  cefTRIAXone   IVPB 1000 milliGRAM(s) IV Intermittent every 24 hours  enoxaparin Injectable 40 milliGRAM(s) SubCutaneous every 24 hours  folic acid 1 milliGRAM(s) Oral daily  lactated ringers. 1000 milliLiter(s) (50 mL/Hr) IV Continuous <Continuous>  levothyroxine 112 MICROGram(s) Oral daily  multivitamin 1 Tablet(s) Oral daily  tamoxifen 20 milliGRAM(s) Oral daily    MEDICATIONS  (PRN):      CAPILLARY BLOOD GLUCOSE        I&O's Summary      PHYSICAL EXAM:  Vital Signs Last 24 Hrs  T(C): 36.3 (19 Sep 2023 06:27), Max: 36.6 (19 Sep 2023 01:00)  T(F): 97.3 (19 Sep 2023 06:27), Max: 97.8 (19 Sep 2023 01:00)  HR: 71 (19 Sep 2023 06:27) (71 - 78)  BP: 145/82 (19 Sep 2023 06:27) (130/84 - 145/82)  BP(mean): 100 (19 Sep 2023 01:00) (100 - 100)  RR: 18 (19 Sep 2023 06:27) (18 - 18)  SpO2: 97% (19 Sep 2023 06:27) (96% - 97%)    Parameters below as of 19 Sep 2023 06:27  Patient On (Oxygen Delivery Method): room air        GENERAL: NAD, lying in bed comfortably  HEAD: Atraumatic, normocephalic  EYES: EOMI, PERRLA  ENT: Moist mucous membranes  NECK: Supple, no JVD  HEART: S1, S2, Regular rate and rhythm, no murmurs, rubs, or gallops  LUNGS: Unlabored respirations, clear to auscultation bilaterally, no crackles, wheezing, or rhonchi  ABDOMEN: Soft, nontender, nondistended, +BS  EXTREMITIES: No clubbing, cyanosis, or edema  NERVOUS SYSTEM:  A&Ox3, no focal deficits   SKIN: No rashes or lesions    LABS:                        14.1   8.21  )-----------( 157      ( 18 Sep 2023 22:19 )             46.5         157<H>  |  120<H>  |  18  ----------------------------<  125<H>  4.0   |  27  |  0.57    Ca    8.8      18 Sep 2023 22:19    TPro  6.7  /  Alb  3.9  /  TBili  0.8  /  DBili  x   /  AST  18  /  ALT  20  /  AlkPhos  95      PT/INR - ( 18 Sep 2023 22:19 )   PT: 11.7 sec;   INR: 1.12 ratio         PTT - ( 18 Sep 2023 22:19 )  PTT:27.4 sec      Urinalysis Basic - ( 19 Sep 2023 01:51 )    Color: Yellow / Appearance: Slightly Turbid / S.026 / pH: x  Gluc: x / Ketone: Negative  / Bili: Negative / Urobili: Negative   Blood: x / Protein: Trace / Nitrite: Positive   Leuk Esterase: Large / RBC: 2 /hpf / WBC 45 /HPF   Sq Epi: x / Non Sq Epi: x / Bacteria: Many          RADIOLOGY & ADDITIONAL TESTS:  Results Reviewed:   Imaging Personally Reviewed:  Electrocardiogram Personally Reviewed:  Tele: *******************************  Eleazar Miller MD (PGY-1)  Internal Medicine  Contact via [employer] TEAMS  *******************************    PROGRESS NOTE:     Patient is a 68y old  Female who presents with a chief complaint of hypernatremia (19 Sep 2023 02:24)      INTERVAL EVENTS: No acute overnight events.     SUBJECTIVE: Patient seen and examined at bedside. Unable to obtain ROS.     MEDICATIONS  (STANDING):  cefTRIAXone   IVPB 1000 milliGRAM(s) IV Intermittent every 24 hours  enoxaparin Injectable 40 milliGRAM(s) SubCutaneous every 24 hours  folic acid 1 milliGRAM(s) Oral daily  lactated ringers. 1000 milliLiter(s) (50 mL/Hr) IV Continuous <Continuous>  levothyroxine 112 MICROGram(s) Oral daily  multivitamin 1 Tablet(s) Oral daily  tamoxifen 20 milliGRAM(s) Oral daily    MEDICATIONS  (PRN):      CAPILLARY BLOOD GLUCOSE        I&O's Summary      PHYSICAL EXAM:  Vital Signs Last 24 Hrs  T(C): 36.3 (19 Sep 2023 06:27), Max: 36.6 (19 Sep 2023 01:00)  T(F): 97.3 (19 Sep 2023 06:27), Max: 97.8 (19 Sep 2023 01:00)  HR: 71 (19 Sep 2023 06:27) (71 - 78)  BP: 145/82 (19 Sep 2023 06:27) (130/84 - 145/82)  BP(mean): 100 (19 Sep 2023 01:00) (100 - 100)  RR: 18 (19 Sep 2023 06:27) (18 - 18)  SpO2: 97% (19 Sep 2023 06:27) (96% - 97%)    Parameters below as of 19 Sep 2023 06:27  Patient On (Oxygen Delivery Method): room air        GENERAL: Frail appearing, NAD  HEAD: Atraumatic, normocephalic  EYES: PERRLA  ENT: Dry mucous membranes with poor dentation  NECK: Supple  HEART: S1, S2, Regular rate and rhythm, no murmurs, rubs, or gallops  LUNGS: Unlabored respirations, clear to auscultation bilaterally, no crackles, wheezing, or rhonchi  ABDOMEN: Soft, nontender, nondistended, +BS  EXTREMITIES: No clubbing, cyanosis, or edema  NERVOUS SYSTEM:  A&Ox0      LABS:                        14.1   8.21  )-----------( 157      ( 18 Sep 2023 22:19 )             46.5         157<H>  |  120<H>  |  18  ----------------------------<  125<H>  4.0   |  27  |  0.57    Ca    8.8      18 Sep 2023 22:19    TPro  6.7  /  Alb  3.9  /  TBili  0.8  /  DBili  x   /  AST  18  /  ALT  20  /  AlkPhos  95      PT/INR - ( 18 Sep 2023 22:19 )   PT: 11.7 sec;   INR: 1.12 ratio         PTT - ( 18 Sep 2023 22:19 )  PTT:27.4 sec      Urinalysis Basic - ( 19 Sep 2023 01:51 )    Color: Yellow / Appearance: Slightly Turbid / S.026 / pH: x  Gluc: x / Ketone: Negative  / Bili: Negative / Urobili: Negative   Blood: x / Protein: Trace / Nitrite: Positive   Leuk Esterase: Large / RBC: 2 /hpf / WBC 45 /HPF   Sq Epi: x / Non Sq Epi: x / Bacteria: Many          RADIOLOGY & ADDITIONAL TESTS:  Results Reviewed:   Imaging Personally Reviewed:  Electrocardiogram Personally Reviewed:  Tele:

## 2023-09-19 NOTE — PROGRESS NOTE ADULT - PROBLEM SELECTOR PLAN 2
Unclear of chronicity of UTI, but per , pt appeared more lethargic than baseline so will treat    - f/u Ucx  - CTX 9/19 - Unclear of chronicity of UTI, but per , pt appeared more lethargic than baseline so will treat  - f/u Ucx  - C/w CTX 1g qday

## 2023-09-19 NOTE — PROGRESS NOTE ADULT - PROBLEM SELECTOR PLAN 6
DVT ppx: lovenox  Diet: NPO pending bedside dysphagia screen - if passes, then regular food until formal S/S eval  Dispo: f/u PT recs    Ethics: full code:  Silverio Arthur is the HCP (ophthalmologist) - c/w home synthroid 112mg  - f/u TSH

## 2023-09-20 DIAGNOSIS — J32.9 CHRONIC SINUSITIS, UNSPECIFIED: ICD-10-CM

## 2023-09-20 LAB
ALBUMIN SERPL ELPH-MCNC: 3.1 G/DL — LOW (ref 3.3–5)
ALBUMIN SERPL ELPH-MCNC: 3.2 G/DL — LOW (ref 3.3–5)
ALP SERPL-CCNC: 79 U/L — SIGNIFICANT CHANGE UP (ref 40–120)
ALP SERPL-CCNC: 80 U/L — SIGNIFICANT CHANGE UP (ref 40–120)
ALT FLD-CCNC: 13 U/L — SIGNIFICANT CHANGE UP (ref 10–45)
ALT FLD-CCNC: 15 U/L — SIGNIFICANT CHANGE UP (ref 10–45)
ANION GAP SERPL CALC-SCNC: 11 MMOL/L — SIGNIFICANT CHANGE UP (ref 5–17)
ANION GAP SERPL CALC-SCNC: 9 MMOL/L — SIGNIFICANT CHANGE UP (ref 5–17)
ANION GAP SERPL CALC-SCNC: 9 MMOL/L — SIGNIFICANT CHANGE UP (ref 5–17)
AST SERPL-CCNC: 13 U/L — SIGNIFICANT CHANGE UP (ref 10–40)
AST SERPL-CCNC: 14 U/L — SIGNIFICANT CHANGE UP (ref 10–40)
BASOPHILS # BLD AUTO: 0.03 K/UL — SIGNIFICANT CHANGE UP (ref 0–0.2)
BASOPHILS NFR BLD AUTO: 0.3 % — SIGNIFICANT CHANGE UP (ref 0–2)
BILIRUB SERPL-MCNC: 0.8 MG/DL — SIGNIFICANT CHANGE UP (ref 0.2–1.2)
BILIRUB SERPL-MCNC: 1 MG/DL — SIGNIFICANT CHANGE UP (ref 0.2–1.2)
BUN SERPL-MCNC: 12 MG/DL — SIGNIFICANT CHANGE UP (ref 7–23)
BUN SERPL-MCNC: 12 MG/DL — SIGNIFICANT CHANGE UP (ref 7–23)
BUN SERPL-MCNC: 13 MG/DL — SIGNIFICANT CHANGE UP (ref 7–23)
CALCIUM SERPL-MCNC: 8.1 MG/DL — LOW (ref 8.4–10.5)
CALCIUM SERPL-MCNC: 8.1 MG/DL — LOW (ref 8.4–10.5)
CALCIUM SERPL-MCNC: 8.6 MG/DL — SIGNIFICANT CHANGE UP (ref 8.4–10.5)
CHLORIDE SERPL-SCNC: 112 MMOL/L — HIGH (ref 96–108)
CHLORIDE SERPL-SCNC: 113 MMOL/L — HIGH (ref 96–108)
CHLORIDE SERPL-SCNC: 114 MMOL/L — HIGH (ref 96–108)
CO2 SERPL-SCNC: 24 MMOL/L — SIGNIFICANT CHANGE UP (ref 22–31)
CO2 SERPL-SCNC: 25 MMOL/L — SIGNIFICANT CHANGE UP (ref 22–31)
CO2 SERPL-SCNC: 26 MMOL/L — SIGNIFICANT CHANGE UP (ref 22–31)
CREAT SERPL-MCNC: 0.51 MG/DL — SIGNIFICANT CHANGE UP (ref 0.5–1.3)
CREAT SERPL-MCNC: 0.52 MG/DL — SIGNIFICANT CHANGE UP (ref 0.5–1.3)
CREAT SERPL-MCNC: 0.53 MG/DL — SIGNIFICANT CHANGE UP (ref 0.5–1.3)
EGFR: 100 ML/MIN/1.73M2 — SIGNIFICANT CHANGE UP
EGFR: 100 ML/MIN/1.73M2 — SIGNIFICANT CHANGE UP
EGFR: 99 ML/MIN/1.73M2 — SIGNIFICANT CHANGE UP
EOSINOPHIL # BLD AUTO: 0.07 K/UL — SIGNIFICANT CHANGE UP (ref 0–0.5)
EOSINOPHIL NFR BLD AUTO: 0.8 % — SIGNIFICANT CHANGE UP (ref 0–6)
GLUCOSE SERPL-MCNC: 100 MG/DL — HIGH (ref 70–99)
GLUCOSE SERPL-MCNC: 118 MG/DL — HIGH (ref 70–99)
GLUCOSE SERPL-MCNC: 122 MG/DL — HIGH (ref 70–99)
HCT VFR BLD CALC: 40.3 % — SIGNIFICANT CHANGE UP (ref 34.5–45)
HGB BLD-MCNC: 12.4 G/DL — SIGNIFICANT CHANGE UP (ref 11.5–15.5)
IMM GRANULOCYTES NFR BLD AUTO: 0.2 % — SIGNIFICANT CHANGE UP (ref 0–0.9)
LYMPHOCYTES # BLD AUTO: 1.5 K/UL — SIGNIFICANT CHANGE UP (ref 1–3.3)
LYMPHOCYTES # BLD AUTO: 17.1 % — SIGNIFICANT CHANGE UP (ref 13–44)
MAGNESIUM SERPL-MCNC: 2.1 MG/DL — SIGNIFICANT CHANGE UP (ref 1.6–2.6)
MCHC RBC-ENTMCNC: 29.2 PG — SIGNIFICANT CHANGE UP (ref 27–34)
MCHC RBC-ENTMCNC: 30.8 GM/DL — LOW (ref 32–36)
MCV RBC AUTO: 95 FL — SIGNIFICANT CHANGE UP (ref 80–100)
MONOCYTES # BLD AUTO: 0.44 K/UL — SIGNIFICANT CHANGE UP (ref 0–0.9)
MONOCYTES NFR BLD AUTO: 5 % — SIGNIFICANT CHANGE UP (ref 2–14)
NEUTROPHILS # BLD AUTO: 6.7 K/UL — SIGNIFICANT CHANGE UP (ref 1.8–7.4)
NEUTROPHILS NFR BLD AUTO: 76.6 % — SIGNIFICANT CHANGE UP (ref 43–77)
NRBC # BLD: 0 /100 WBCS — SIGNIFICANT CHANGE UP (ref 0–0)
PHOSPHATE SERPL-MCNC: 2.5 MG/DL — SIGNIFICANT CHANGE UP (ref 2.5–4.5)
PLATELET # BLD AUTO: 119 K/UL — LOW (ref 150–400)
POTASSIUM SERPL-MCNC: 3.1 MMOL/L — LOW (ref 3.5–5.3)
POTASSIUM SERPL-MCNC: 3.6 MMOL/L — SIGNIFICANT CHANGE UP (ref 3.5–5.3)
POTASSIUM SERPL-MCNC: 3.9 MMOL/L — SIGNIFICANT CHANGE UP (ref 3.5–5.3)
POTASSIUM SERPL-SCNC: 3.1 MMOL/L — LOW (ref 3.5–5.3)
POTASSIUM SERPL-SCNC: 3.6 MMOL/L — SIGNIFICANT CHANGE UP (ref 3.5–5.3)
POTASSIUM SERPL-SCNC: 3.9 MMOL/L — SIGNIFICANT CHANGE UP (ref 3.5–5.3)
PROT SERPL-MCNC: 5.4 G/DL — LOW (ref 6–8.3)
PROT SERPL-MCNC: 5.5 G/DL — LOW (ref 6–8.3)
RBC # BLD: 4.24 M/UL — SIGNIFICANT CHANGE UP (ref 3.8–5.2)
RBC # FLD: 14.4 % — SIGNIFICANT CHANGE UP (ref 10.3–14.5)
SODIUM SERPL-SCNC: 145 MMOL/L — SIGNIFICANT CHANGE UP (ref 135–145)
SODIUM SERPL-SCNC: 149 MMOL/L — HIGH (ref 135–145)
SODIUM SERPL-SCNC: 149 MMOL/L — HIGH (ref 135–145)
WBC # BLD: 8.76 K/UL — SIGNIFICANT CHANGE UP (ref 3.8–10.5)
WBC # FLD AUTO: 8.76 K/UL — SIGNIFICANT CHANGE UP (ref 3.8–10.5)

## 2023-09-20 PROCEDURE — 99232 SBSQ HOSP IP/OBS MODERATE 35: CPT | Mod: GC

## 2023-09-20 RX ORDER — SODIUM CHLORIDE 9 MG/ML
1000 INJECTION, SOLUTION INTRAVENOUS
Refills: 0 | Status: DISCONTINUED | OUTPATIENT
Start: 2023-09-20 | End: 2023-09-21

## 2023-09-20 RX ORDER — SODIUM CHLORIDE 9 MG/ML
1000 INJECTION, SOLUTION INTRAVENOUS
Refills: 0 | Status: DISCONTINUED | OUTPATIENT
Start: 2023-09-20 | End: 2023-09-20

## 2023-09-20 RX ORDER — POTASSIUM CHLORIDE 20 MEQ
10 PACKET (EA) ORAL
Refills: 0 | Status: COMPLETED | OUTPATIENT
Start: 2023-09-20 | End: 2023-09-20

## 2023-09-20 RX ADMIN — Medication 100 MILLIEQUIVALENT(S): at 02:23

## 2023-09-20 RX ADMIN — ENOXAPARIN SODIUM 40 MILLIGRAM(S): 100 INJECTION SUBCUTANEOUS at 07:30

## 2023-09-20 RX ADMIN — SODIUM CHLORIDE 50 MILLILITER(S): 9 INJECTION, SOLUTION INTRAVENOUS at 12:34

## 2023-09-20 RX ADMIN — Medication 100 MILLIEQUIVALENT(S): at 04:12

## 2023-09-20 RX ADMIN — Medication 112 MICROGRAM(S): at 05:52

## 2023-09-20 RX ADMIN — Medication 1 TABLET(S): at 16:27

## 2023-09-20 RX ADMIN — Medication 1 MILLIGRAM(S): at 12:38

## 2023-09-20 RX ADMIN — CEFTRIAXONE 100 MILLIGRAM(S): 500 INJECTION, POWDER, FOR SOLUTION INTRAMUSCULAR; INTRAVENOUS at 07:30

## 2023-09-20 RX ADMIN — Medication 1 TABLET(S): at 12:38

## 2023-09-20 RX ADMIN — Medication 100 MILLIEQUIVALENT(S): at 03:27

## 2023-09-20 RX ADMIN — Medication 1 TABLET(S): at 23:04

## 2023-09-20 RX ADMIN — TAMOXIFEN CITRATE 20 MILLIGRAM(S): 20 TABLET, FILM COATED ORAL at 12:38

## 2023-09-20 RX ADMIN — SODIUM CHLORIDE 50 MILLILITER(S): 9 INJECTION, SOLUTION INTRAVENOUS at 16:27

## 2023-09-20 NOTE — DIETITIAN INITIAL EVALUATION ADULT - NSFNSPHYEXAMSKINFT_GEN_A_CORE
per flowsheets:  Sacrum stage 1 pressure injury  Right heel stage 1 pressure injury  Left heel stage 1 pressure injury

## 2023-09-20 NOTE — PROGRESS NOTE ADULT - SUBJECTIVE AND OBJECTIVE BOX
*******************************  Eleazar Miller MD (PGY-1)  Internal Medicine  Contact via [employer] TEAMS  *******************************    PROGRESS NOTE:     Patient is a 68y old  Female who presents with a chief complaint of hypernatremia (19 Sep 2023 06:59)      INTERVAL EVENTS: No acute overnight events.     SUBJECTIVE: Patient seen and examined at bedside. This morning, the patient is comfortable and doing well. No acute complaints. Denies fevers, chills, N/V/D, chest pain, SOB, abdominal pain.    MEDICATIONS  (STANDING):  cefTRIAXone   IVPB 1000 milliGRAM(s) IV Intermittent every 24 hours  dextrose 5%. 1000 milliLiter(s) (50 mL/Hr) IV Continuous <Continuous>  enoxaparin Injectable 40 milliGRAM(s) SubCutaneous every 24 hours  folic acid 1 milliGRAM(s) Oral daily  levothyroxine 112 MICROGram(s) Oral daily  multivitamin 1 Tablet(s) Oral daily  tamoxifen 20 milliGRAM(s) Oral daily    MEDICATIONS  (PRN):      CAPILLARY BLOOD GLUCOSE        I&O's Summary    19 Sep 2023 07:01  -  20 Sep 2023 06:43  --------------------------------------------------------  IN: 240 mL / OUT: 600 mL / NET: -360 mL        PHYSICAL EXAM:  Vital Signs Last 24 Hrs  T(C): 36.6 (20 Sep 2023 05:46), Max: 37.1 (19 Sep 2023 21:11)  T(F): 97.9 (20 Sep 2023 05:46), Max: 98.8 (19 Sep 2023 21:11)  HR: 61 (20 Sep 2023 05:46) (61 - 72)  BP: 98/65 (20 Sep 2023 05:46) (98/65 - 121/71)  BP(mean): --  RR: 18 (20 Sep 2023 05:46) (18 - 18)  SpO2: 97% (20 Sep 2023 05:46) (95% - 97%)    Parameters below as of 20 Sep 2023 05:46  Patient On (Oxygen Delivery Method): room air        GENERAL: NAD, lying in bed comfortably  HEAD: Atraumatic, normocephalic  EYES: EOMI, PERRLA  ENT: Moist mucous membranes  NECK: Supple, no JVD  HEART: S1, S2, Regular rate and rhythm, no murmurs, rubs, or gallops  LUNGS: Unlabored respirations, clear to auscultation bilaterally, no crackles, wheezing, or rhonchi  ABDOMEN: Soft, nontender, nondistended, +BS  EXTREMITIES: No clubbing, cyanosis, or edema  NERVOUS SYSTEM:  A&Ox3, no focal deficits   SKIN: No rashes or lesions    LABS:                        12.0   6.62  )-----------( 139      ( 19 Sep 2023 10:33 )             38.1     09-20    149<H>  |  114<H>  |  13  ----------------------------<  122<H>  3.1<L>   |  26  |  0.53    Ca    8.1<L>      20 Sep 2023 01:05  Phos  2.4     09-19  Mg     2.2     09-19    TPro  5.4<L>  /  Alb  3.1<L>  /  TBili  0.8  /  DBili  x   /  AST  13  /  ALT  15  /  AlkPhos  79  09-20    PT/INR - ( 18 Sep 2023 22:19 )   PT: 11.7 sec;   INR: 1.12 ratio         PTT - ( 18 Sep 2023 22:19 )  PTT:27.4 sec      Urinalysis Basic - ( 20 Sep 2023 01:05 )    Color: x / Appearance: x / SG: x / pH: x  Gluc: 122 mg/dL / Ketone: x  / Bili: x / Urobili: x   Blood: x / Protein: x / Nitrite: x   Leuk Esterase: x / RBC: x / WBC x   Sq Epi: x / Non Sq Epi: x / Bacteria: x          RADIOLOGY & ADDITIONAL TESTS:  Results Reviewed:   Imaging Personally Reviewed:  Electrocardiogram Personally Reviewed:  Tele: *******************************  Eleazar Miller MD (PGY-1)  Internal Medicine  Contact via [employer] TEAMS  *******************************    PROGRESS NOTE:     Patient is a 68y old  Female who presents with a chief complaint of hypernatremia (19 Sep 2023 06:59)      INTERVAL EVENTS: No acute overnight events.     SUBJECTIVE: Patient seen and examined at bedside.Unable to obtain ROS    MEDICATIONS  (STANDING):  cefTRIAXone   IVPB 1000 milliGRAM(s) IV Intermittent every 24 hours  dextrose 5%. 1000 milliLiter(s) (50 mL/Hr) IV Continuous <Continuous>  enoxaparin Injectable 40 milliGRAM(s) SubCutaneous every 24 hours  folic acid 1 milliGRAM(s) Oral daily  levothyroxine 112 MICROGram(s) Oral daily  multivitamin 1 Tablet(s) Oral daily  tamoxifen 20 milliGRAM(s) Oral daily    MEDICATIONS  (PRN):      CAPILLARY BLOOD GLUCOSE        I&O's Summary    19 Sep 2023 07:01  -  20 Sep 2023 06:43  --------------------------------------------------------  IN: 240 mL / OUT: 600 mL / NET: -360 mL        PHYSICAL EXAM:  Vital Signs Last 24 Hrs  T(C): 36.6 (20 Sep 2023 05:46), Max: 37.1 (19 Sep 2023 21:11)  T(F): 97.9 (20 Sep 2023 05:46), Max: 98.8 (19 Sep 2023 21:11)  HR: 61 (20 Sep 2023 05:46) (61 - 72)  BP: 98/65 (20 Sep 2023 05:46) (98/65 - 121/71)  BP(mean): --  RR: 18 (20 Sep 2023 05:46) (18 - 18)  SpO2: 97% (20 Sep 2023 05:46) (95% - 97%)    Parameters below as of 20 Sep 2023 05:46  Patient On (Oxygen Delivery Method): room air        GENERAL: Frail appearing, NAD  HEAD: Atraumatic, normocephalic  EYES: PERRLA  ENT: Dry mucous membranes with poor dentation  NECK: Supple  HEART: S1, S2, Regular rate and rhythm, no murmurs, rubs, or gallops  LUNGS: Unlabored respirations, clear to auscultation bilaterally, no crackles, wheezing, or rhonchi  ABDOMEN: Soft, nontender, nondistended, +BS  EXTREMITIES: No clubbing, cyanosis, or edema  NERVOUS SYSTEM:  A&Ox0    LABS:                        12.0   6.62  )-----------( 139      ( 19 Sep 2023 10:33 )             38.1     09-20    149<H>  |  114<H>  |  13  ----------------------------<  122<H>  3.1<L>   |  26  |  0.53    Ca    8.1<L>      20 Sep 2023 01:05  Phos  2.4     09-19  Mg     2.2     09-19    TPro  5.4<L>  /  Alb  3.1<L>  /  TBili  0.8  /  DBili  x   /  AST  13  /  ALT  15  /  AlkPhos  79  09-20    PT/INR - ( 18 Sep 2023 22:19 )   PT: 11.7 sec;   INR: 1.12 ratio         PTT - ( 18 Sep 2023 22:19 )  PTT:27.4 sec      Urinalysis Basic - ( 20 Sep 2023 01:05 )    Color: x / Appearance: x / SG: x / pH: x  Gluc: 122 mg/dL / Ketone: x  / Bili: x / Urobili: x   Blood: x / Protein: x / Nitrite: x   Leuk Esterase: x / RBC: x / WBC x   Sq Epi: x / Non Sq Epi: x / Bacteria: x          RADIOLOGY & ADDITIONAL TESTS:  Results Reviewed:   Imaging Personally Reviewed:  Electrocardiogram Personally Reviewed:  Tele:

## 2023-09-20 NOTE — DIETITIAN INITIAL EVALUATION ADULT - NSFNSNUTRCHEWSWALLOWFT_GEN_A_CORE
Patient seen for swallow bedside evaluation on 9/19 with recommendations for "PUREE and MODERATELY THICK LIQUIDS via 1/2 tsp amounts, alternate liquid and solid"

## 2023-09-20 NOTE — DIETITIAN INITIAL EVALUATION ADULT - PERTINENT MEDS FT
MEDICATIONS  (STANDING):  amoxicillin  500 milliGRAM(s)/clavulanate 1 Tablet(s) Oral every 8 hours  enoxaparin Injectable 40 milliGRAM(s) SubCutaneous every 24 hours  folic acid 1 milliGRAM(s) Oral daily  lactated ringers. 1000 milliLiter(s) (50 mL/Hr) IV Continuous <Continuous>  levothyroxine 112 MICROGram(s) Oral daily  multivitamin 1 Tablet(s) Oral daily  tamoxifen 20 milliGRAM(s) Oral daily

## 2023-09-20 NOTE — DIETITIAN INITIAL EVALUATION ADULT - PHYSCIAL ASSESSMENT
Visual nutrition-focused physical examination conducted as pt nonverbal and confused, unable to consent to exam at this time. Visual findings noted.

## 2023-09-20 NOTE — DIETITIAN INITIAL EVALUATION ADULT - PROBLEM SELECTOR PLAN 2
Unclear of chronicity of UTI, but per , pt appeared more lethargic than baseline so will treat    - f/u Ucx  - CTX 9/19 -

## 2023-09-20 NOTE — PROGRESS NOTE ADULT - ASSESSMENT
This is a 67yo/F w/ PMHx breast cancer s/p mastectomy now on tamoxifen, aphasia s/p TBI, meningioma, hypothyroidism (hx of Grave's now s/p radioactive iodine) who presented to hospital for fingernail laceration. Found to be hypernatremic to 157 This is a 67yo/F w/ PMHx breast cancer s/p mastectomy now on tamoxifen, aphasia s/p TBI, meningioma, hypothyroidism (hx of Grave's now s/p radioactive iodine) who presented to hospital for fingernail laceration. Found to be hypernatremic to 157; now w/ Na 145

## 2023-09-20 NOTE — DIETITIAN INITIAL EVALUATION ADULT - ENERGY INTAKE
26-50% intake documented per flowsheets, confirmed by RN who reports patient requires total assistance with feeding. Poor (<50%)

## 2023-09-20 NOTE — DIETITIAN INITIAL EVALUATION ADULT - PROBLEM SELECTOR PLAN 5
Simple: Patient demonstrates quick and easy understanding - c/w home synthroid 112mg  - f/u TSH in AM

## 2023-09-20 NOTE — DIETITIAN INITIAL EVALUATION ADULT - OTHER CALCULATIONS
Defer fluid needs to team.   Estimated nutrient needs based on IBW 130lb, with consideration for skin integrity and PMHx cancer.

## 2023-09-20 NOTE — DIETITIAN INITIAL EVALUATION ADULT - PROBLEM SELECTOR PLAN 1
Found to be hyperNa to 157 this admission. Likely in setting of poor PO intake in the last month prior to admission. Urine studies FeNa 0.4%: pre-renal     - s/p 1L LR in ED  - will give 50cc/h LR x24h and CTM Na     - may consider nephro c/s if Na not improving with more time on IVF   - f/u S/s eval for determination of pt capacity to swallow food vs. necessity of PEG -  aware of this possibility and amendable

## 2023-09-20 NOTE — DIETITIAN INITIAL EVALUATION ADULT - SIGNS/SYMPTOMS
Patient with impaired skin integrity and PMHx breast cancer Physical findings of muscle loss, <75% estimated energy needs =/> 1 month

## 2023-09-20 NOTE — PROGRESS NOTE ADULT - PROBLEM SELECTOR PLAN 4
S/p TBI with resultant aphasia.   - f/u PT eval Pt w/ decreased PO intake over past several weeks due to dysphagia. Per , eats regular food at baseline  - Purre diet w/ thick liquids per ss

## 2023-09-20 NOTE — PROGRESS NOTE ADULT - PROBLEM SELECTOR PLAN 3
Pt w/ decreased PO intake over past several weeks due to dysphagia. Per , eats regular food at baseline  - F/u S/S eval for determination of pt capacity to swallow food vs. necessity of PEG Pt w/ decreased PO intake over past several weeks due to dysphagia. Per , eats regular food at baseline  - Purre diet w/ thick liquids per ss Pt w/ evidence of sinusitis on CTH  - Start augmentin 500/125 q8h x5 days

## 2023-09-20 NOTE — DIETITIAN INITIAL EVALUATION ADULT - ORAL INTAKE PTA/DIET HISTORY
Unable to assess as patient nonverbal and family not present at bedside at time of visit.  Per H&P, "Per , pt has appeared more tired and had difficulty swallowing (and pocketing food) more in the last month. As a result, pt hasn't had as much po intake in the last month."

## 2023-09-20 NOTE — DIETITIAN INITIAL EVALUATION ADULT - PERTINENT LABORATORY DATA
09-20   Na 145 mmol/L   Glu 118 mg/dL<H>   K+ 3.9 mmol/L   Cr 0.52 mg/dL   BUN 12 mg/dL   Phos 2.5 mg/dL    Mg 2.1 mg/dL   A1C with Estimated Average Glucose Result: 5.7 % (09-19-23 @ 10:33)

## 2023-09-20 NOTE — DIETITIAN INITIAL EVALUATION ADULT - REASON INDICATOR FOR ASSESSMENT
RD assessment warranted for: MST score 2 or > and Assessment. Chart reviewed, events noted.  Source: medical record, RN.  Patient inappropriate to interview, patient noted to be nonverbal and have confusion. Family not present at bedside at time of visit.

## 2023-09-20 NOTE — DIETITIAN INITIAL EVALUATION ADULT - ADD RECOMMEND
-Continue current diet: regular, defer texture/consistency to team. RD to add moderately thick mighty shakes 3x/day to assist in PO intake.  -Recommend adding vitamin C (pending no contraindications) for wound healing and in setting of malnutrition.  -Monitor PO intake, diet, weight, labs, skin, GI symptoms, and BM regularity.  -RD remains available upon request and will follow up per protocol.  -Malnutrition alert placed in chart.

## 2023-09-20 NOTE — DIETITIAN INITIAL EVALUATION ADULT - NSICDXPASTMEDICALHX_GEN_ALL_CORE_FT
PAST MEDICAL HISTORY:  Breast cancer     Hyperthyroidism 1975- s/p radio active iodine RX    Hypothyroidism     Multiple falls     SAH (subarachnoid hemorrhage)     SVT (supraventricular tachycardia)

## 2023-09-20 NOTE — DIETITIAN INITIAL EVALUATION ADULT - OTHER INFO
Unable to obtain patient-reported UBW or weight change.   Dosing weight: 110lb (9/18).  Weight history per [facility]: 119.9lb (3/17/23), 129.6lb (6/2/21).  Indicates a 8% weight loss x 6 months. RD to continue to monitor weight trends as available/able.     -Ordered for antibiotics, multivitamin, folic acid, lactated ringers, and synthroid.  -S/p KCl 9/20 for low K+, K+ now WDL.

## 2023-09-20 NOTE — PROGRESS NOTE ADULT - PROBLEM SELECTOR PLAN 7
DVT ppx: lovenox  Diet: NPO pending bedside dysphagia screen - if passes, then regular food until formal S/S eval  Dispo: f/u PT recs    Ethics: full code:  Silverio Arthur is the HCP (ophthalmologist) DVT ppx: lovenox  Diet: Puure  Dispo: f/u PT recs    Ethics: full code:  Silverio Arthur is the HCP (ophthalmologist) - c/w home synthroid 112mg  - f/u TSH

## 2023-09-20 NOTE — PROGRESS NOTE ADULT - PROBLEM SELECTOR PLAN 1
Found to be hyperNa to 157 this admission. Likely in setting of poor PO intake in the last month prior to admission.   - s/p 1L LR in ED  - C/w 50cc/h LR  - Na 154 (9/19)   - Trend BMPs Found to be hyperNa to 157 this admission. Likely in setting of poor PO intake in the last month prior to admission.   - s/p 1L LR in ED  - C/w 50cc/h LR  - Na 145 (9/20)   - Trend BMPs

## 2023-09-20 NOTE — DIETITIAN INITIAL EVALUATION ADULT - NSFNSGIIOFT_GEN_A_CORE
Per RN, patient with no nausea/vomiting, diarrhea, or constipation. Last BM 9/20 per flowsheets. Bowel regimen:

## 2023-09-20 NOTE — DIETITIAN INITIAL EVALUATION ADULT - REASON FOR ADMISSION
Hyperosmolality with hypernatremia    Per chart: "This is a 67yo/F w/ PMHx breast cancer s/p mastectomy now on tamoxifen, aphasia s/p TBI, meningioma, hypothyroidism (hx of Grave's now s/p radioactive iodine) who presented to hospital for fingernail laceration. Found to be hypernatremic to 157, so admitted to medicine for further workup."

## 2023-09-21 LAB
ALBUMIN SERPL ELPH-MCNC: 3.1 G/DL — LOW (ref 3.3–5)
ALP SERPL-CCNC: 81 U/L — SIGNIFICANT CHANGE UP (ref 40–120)
ALT FLD-CCNC: 14 U/L — SIGNIFICANT CHANGE UP (ref 10–45)
ANION GAP SERPL CALC-SCNC: 10 MMOL/L — SIGNIFICANT CHANGE UP (ref 5–17)
ANION GAP SERPL CALC-SCNC: 11 MMOL/L — SIGNIFICANT CHANGE UP (ref 5–17)
ANION GAP SERPL CALC-SCNC: 11 MMOL/L — SIGNIFICANT CHANGE UP (ref 5–17)
AST SERPL-CCNC: 11 U/L — SIGNIFICANT CHANGE UP (ref 10–40)
BILIRUB SERPL-MCNC: 0.9 MG/DL — SIGNIFICANT CHANGE UP (ref 0.2–1.2)
BUN SERPL-MCNC: 11 MG/DL — SIGNIFICANT CHANGE UP (ref 7–23)
BUN SERPL-MCNC: 11 MG/DL — SIGNIFICANT CHANGE UP (ref 7–23)
BUN SERPL-MCNC: 8 MG/DL — SIGNIFICANT CHANGE UP (ref 7–23)
CALCIUM SERPL-MCNC: 8.1 MG/DL — LOW (ref 8.4–10.5)
CALCIUM SERPL-MCNC: 8.2 MG/DL — LOW (ref 8.4–10.5)
CALCIUM SERPL-MCNC: 8.5 MG/DL — SIGNIFICANT CHANGE UP (ref 8.4–10.5)
CHLORIDE SERPL-SCNC: 106 MMOL/L — SIGNIFICANT CHANGE UP (ref 96–108)
CHLORIDE SERPL-SCNC: 107 MMOL/L — SIGNIFICANT CHANGE UP (ref 96–108)
CHLORIDE SERPL-SCNC: 111 MMOL/L — HIGH (ref 96–108)
CO2 SERPL-SCNC: 24 MMOL/L — SIGNIFICANT CHANGE UP (ref 22–31)
CO2 SERPL-SCNC: 24 MMOL/L — SIGNIFICANT CHANGE UP (ref 22–31)
CO2 SERPL-SCNC: 25 MMOL/L — SIGNIFICANT CHANGE UP (ref 22–31)
CREAT SERPL-MCNC: 0.46 MG/DL — LOW (ref 0.5–1.3)
CREAT SERPL-MCNC: 0.48 MG/DL — LOW (ref 0.5–1.3)
CREAT SERPL-MCNC: 0.51 MG/DL — SIGNIFICANT CHANGE UP (ref 0.5–1.3)
EGFR: 100 ML/MIN/1.73M2 — SIGNIFICANT CHANGE UP
EGFR: 102 ML/MIN/1.73M2 — SIGNIFICANT CHANGE UP
EGFR: 103 ML/MIN/1.73M2 — SIGNIFICANT CHANGE UP
GLUCOSE SERPL-MCNC: 114 MG/DL — HIGH (ref 70–99)
GLUCOSE SERPL-MCNC: 117 MG/DL — HIGH (ref 70–99)
GLUCOSE SERPL-MCNC: 131 MG/DL — HIGH (ref 70–99)
HCT VFR BLD CALC: 35.8 % — SIGNIFICANT CHANGE UP (ref 34.5–45)
HGB BLD-MCNC: 11.5 G/DL — SIGNIFICANT CHANGE UP (ref 11.5–15.5)
MAGNESIUM SERPL-MCNC: 2.1 MG/DL — SIGNIFICANT CHANGE UP (ref 1.6–2.6)
MCHC RBC-ENTMCNC: 29.3 PG — SIGNIFICANT CHANGE UP (ref 27–34)
MCHC RBC-ENTMCNC: 32.1 GM/DL — SIGNIFICANT CHANGE UP (ref 32–36)
MCV RBC AUTO: 91.1 FL — SIGNIFICANT CHANGE UP (ref 80–100)
NRBC # BLD: 0 /100 WBCS — SIGNIFICANT CHANGE UP (ref 0–0)
PHOSPHATE SERPL-MCNC: 2.4 MG/DL — LOW (ref 2.5–4.5)
PLATELET # BLD AUTO: 128 K/UL — LOW (ref 150–400)
POTASSIUM SERPL-MCNC: 3.2 MMOL/L — LOW (ref 3.5–5.3)
POTASSIUM SERPL-MCNC: 3.5 MMOL/L — SIGNIFICANT CHANGE UP (ref 3.5–5.3)
POTASSIUM SERPL-MCNC: 4.2 MMOL/L — SIGNIFICANT CHANGE UP (ref 3.5–5.3)
POTASSIUM SERPL-SCNC: 3.2 MMOL/L — LOW (ref 3.5–5.3)
POTASSIUM SERPL-SCNC: 3.5 MMOL/L — SIGNIFICANT CHANGE UP (ref 3.5–5.3)
POTASSIUM SERPL-SCNC: 4.2 MMOL/L — SIGNIFICANT CHANGE UP (ref 3.5–5.3)
PROT SERPL-MCNC: 5.5 G/DL — LOW (ref 6–8.3)
RBC # BLD: 3.93 M/UL — SIGNIFICANT CHANGE UP (ref 3.8–5.2)
RBC # FLD: 14.1 % — SIGNIFICANT CHANGE UP (ref 10.3–14.5)
SODIUM SERPL-SCNC: 141 MMOL/L — SIGNIFICANT CHANGE UP (ref 135–145)
SODIUM SERPL-SCNC: 142 MMOL/L — SIGNIFICANT CHANGE UP (ref 135–145)
SODIUM SERPL-SCNC: 146 MMOL/L — HIGH (ref 135–145)
WBC # BLD: 6.57 K/UL — SIGNIFICANT CHANGE UP (ref 3.8–10.5)
WBC # FLD AUTO: 6.57 K/UL — SIGNIFICANT CHANGE UP (ref 3.8–10.5)

## 2023-09-21 PROCEDURE — 99232 SBSQ HOSP IP/OBS MODERATE 35: CPT | Mod: GC

## 2023-09-21 RX ORDER — POTASSIUM PHOSPHATE, MONOBASIC POTASSIUM PHOSPHATE, DIBASIC 236; 224 MG/ML; MG/ML
15 INJECTION, SOLUTION INTRAVENOUS ONCE
Refills: 0 | Status: COMPLETED | OUTPATIENT
Start: 2023-09-21 | End: 2023-09-21

## 2023-09-21 RX ORDER — POTASSIUM CHLORIDE 20 MEQ
40 PACKET (EA) ORAL EVERY 4 HOURS
Refills: 0 | Status: COMPLETED | OUTPATIENT
Start: 2023-09-21 | End: 2023-09-21

## 2023-09-21 RX ORDER — ASCORBIC ACID 60 MG
500 TABLET,CHEWABLE ORAL DAILY
Refills: 0 | Status: DISCONTINUED | OUTPATIENT
Start: 2023-09-21 | End: 2023-09-23

## 2023-09-21 RX ORDER — SODIUM CHLORIDE 9 MG/ML
500 INJECTION, SOLUTION INTRAVENOUS
Refills: 0 | Status: DISCONTINUED | OUTPATIENT
Start: 2023-09-21 | End: 2023-09-23

## 2023-09-21 RX ADMIN — Medication 1 TABLET(S): at 12:10

## 2023-09-21 RX ADMIN — ENOXAPARIN SODIUM 40 MILLIGRAM(S): 100 INJECTION SUBCUTANEOUS at 05:12

## 2023-09-21 RX ADMIN — Medication 112 MICROGRAM(S): at 05:12

## 2023-09-21 RX ADMIN — Medication 40 MILLIEQUIVALENT(S): at 14:19

## 2023-09-21 RX ADMIN — TAMOXIFEN CITRATE 20 MILLIGRAM(S): 20 TABLET, FILM COATED ORAL at 12:10

## 2023-09-21 RX ADMIN — POTASSIUM PHOSPHATE, MONOBASIC POTASSIUM PHOSPHATE, DIBASIC 62.5 MILLIMOLE(S): 236; 224 INJECTION, SOLUTION INTRAVENOUS at 10:39

## 2023-09-21 RX ADMIN — SODIUM CHLORIDE 50 MILLILITER(S): 9 INJECTION, SOLUTION INTRAVENOUS at 17:32

## 2023-09-21 RX ADMIN — Medication 500 MILLIGRAM(S): at 14:19

## 2023-09-21 RX ADMIN — Medication 500 MILLIGRAM(S): at 12:10

## 2023-09-21 RX ADMIN — Medication 40 MILLIEQUIVALENT(S): at 10:05

## 2023-09-21 RX ADMIN — Medication 1 MILLIGRAM(S): at 12:10

## 2023-09-21 NOTE — PROGRESS NOTE ADULT - ASSESSMENT
This is a 67yo/F w/ PMHx breast cancer s/p mastectomy now on tamoxifen, aphasia s/p TBI, meningioma, hypothyroidism (hx of Grave's now s/p radioactive iodine) who presented to hospital for fingernail laceration. Found to be hypernatremic to 157; now w/ Na 145

## 2023-09-21 NOTE — PROGRESS NOTE ADULT - SUBJECTIVE AND OBJECTIVE BOX
*******************************  Eleazar Miller MD (PGY-1)  Internal Medicine  Contact via [employer] TEAMS  *******************************    PROGRESS NOTE:     Patient is a 68y old  Female who presents with a chief complaint of Hyperosmolality with hypernatremia    Per chart: "This is a 69yo/F w/ PMHx breast cancer s/p mastectomy now on tamoxifen, aphasia s/p TBI, meningioma, hypothyroidism (hx of Grave's now s/p radioactive iodine) who presented to hospital for fingernail laceration. Found to be hypernatremic to 157, so admitted to medicine for further workup."     (20 Sep 2023 10:49)      INTERVAL EVENTS: No acute overnight events.     SUBJECTIVE: Patient seen and examined at bedside. This morning, the patient is comfortable and doing well. No acute complaints. Denies fevers, chills, N/V/D, chest pain, SOB, abdominal pain.    MEDICATIONS  (STANDING):  amoxicillin  500 milliGRAM(s)/clavulanate 1 Tablet(s) Oral every 8 hours  dextrose 5%. 1000 milliLiter(s) (50 mL/Hr) IV Continuous <Continuous>  enoxaparin Injectable 40 milliGRAM(s) SubCutaneous every 24 hours  folic acid 1 milliGRAM(s) Oral daily  levothyroxine 112 MICROGram(s) Oral daily  multivitamin 1 Tablet(s) Oral daily  tamoxifen 20 milliGRAM(s) Oral daily    MEDICATIONS  (PRN):      CAPILLARY BLOOD GLUCOSE        I&O's Summary    19 Sep 2023 07:01  -  20 Sep 2023 07:00  --------------------------------------------------------  IN: 940 mL / OUT: 600 mL / NET: 340 mL    20 Sep 2023 07:01  -  21 Sep 2023 06:53  --------------------------------------------------------  IN: 0 mL / OUT: 400 mL / NET: -400 mL        PHYSICAL EXAM:  Vital Signs Last 24 Hrs  T(C): 36.7 (21 Sep 2023 05:12), Max: 36.8 (20 Sep 2023 12:29)  T(F): 98 (21 Sep 2023 05:12), Max: 98.2 (20 Sep 2023 12:29)  HR: 60 (21 Sep 2023 05:12) (60 - 70)  BP: 102/66 (21 Sep 2023 05:12) (101/61 - 112/67)  BP(mean): --  RR: 18 (21 Sep 2023 05:12) (17 - 18)  SpO2: 95% (21 Sep 2023 05:12) (95% - 96%)    Parameters below as of 21 Sep 2023 05:12  Patient On (Oxygen Delivery Method): room air        GENERAL: NAD, lying in bed comfortably  HEAD: Atraumatic, normocephalic  EYES: EOMI, PERRLA  ENT: Moist mucous membranes  NECK: Supple, no JVD  HEART: S1, S2, Regular rate and rhythm, no murmurs, rubs, or gallops  LUNGS: Unlabored respirations, clear to auscultation bilaterally, no crackles, wheezing, or rhonchi  ABDOMEN: Soft, nontender, nondistended, +BS  EXTREMITIES: No clubbing, cyanosis, or edema  NERVOUS SYSTEM:  A&Ox3, no focal deficits   SKIN: No rashes or lesions    LABS:                        12.4   8.76  )-----------( 119      ( 20 Sep 2023 10:52 )             40.3     09-21    142  |  107  |  11  ----------------------------<  117<H>  3.5   |  24  |  0.51    Ca    8.2<L>      21 Sep 2023 00:17  Phos  2.5     09-20  Mg     2.1     09-20    TPro  5.5<L>  /  Alb  3.1<L>  /  TBili  0.9  /  DBili  x   /  AST  11  /  ALT  14  /  AlkPhos  81  09-21          Urinalysis Basic - ( 21 Sep 2023 00:17 )    Color: x / Appearance: x / SG: x / pH: x  Gluc: 117 mg/dL / Ketone: x  / Bili: x / Urobili: x   Blood: x / Protein: x / Nitrite: x   Leuk Esterase: x / RBC: x / WBC x   Sq Epi: x / Non Sq Epi: x / Bacteria: x          RADIOLOGY & ADDITIONAL TESTS:  Results Reviewed:   Imaging Personally Reviewed:  Electrocardiogram Personally Reviewed:  Tele: *******************************  Eleazar Miller MD (PGY-1)  Internal Medicine  Contact via [employer] TEAMS  *******************************    INTERVAL EVENTS: No acute overnight events.     SUBJECTIVE: Patient seen and examined at bedside. Unable to obtain ROS.     MEDICATIONS  (STANDING):  amoxicillin  500 milliGRAM(s)/clavulanate 1 Tablet(s) Oral every 8 hours  dextrose 5%. 1000 milliLiter(s) (50 mL/Hr) IV Continuous <Continuous>  enoxaparin Injectable 40 milliGRAM(s) SubCutaneous every 24 hours  folic acid 1 milliGRAM(s) Oral daily  levothyroxine 112 MICROGram(s) Oral daily  multivitamin 1 Tablet(s) Oral daily  tamoxifen 20 milliGRAM(s) Oral daily    MEDICATIONS  (PRN):      CAPILLARY BLOOD GLUCOSE        I&O's Summary    19 Sep 2023 07:01  -  20 Sep 2023 07:00  --------------------------------------------------------  IN: 940 mL / OUT: 600 mL / NET: 340 mL    20 Sep 2023 07:01  -  21 Sep 2023 06:53  --------------------------------------------------------  IN: 0 mL / OUT: 400 mL / NET: -400 mL        PHYSICAL EXAM:  Vital Signs Last 24 Hrs  T(C): 36.7 (21 Sep 2023 05:12), Max: 36.8 (20 Sep 2023 12:29)  T(F): 98 (21 Sep 2023 05:12), Max: 98.2 (20 Sep 2023 12:29)  HR: 60 (21 Sep 2023 05:12) (60 - 70)  BP: 102/66 (21 Sep 2023 05:12) (101/61 - 112/67)  BP(mean): --  RR: 18 (21 Sep 2023 05:12) (17 - 18)  SpO2: 95% (21 Sep 2023 05:12) (95% - 96%)    Parameters below as of 21 Sep 2023 05:12  Patient On (Oxygen Delivery Method): room air        GENERAL: Frail appearing, NAD  HEAD: Atraumatic, normocephalic  EYES: PERRLA  ENT: Dry mucous membranes with poor dentation  NECK: Supple  HEART: S1, S2, Regular rate and rhythm, no murmurs, rubs, or gallops  LUNGS: Unlabored respirations, clear to auscultation bilaterally, no crackles, wheezing, or rhonchi  ABDOMEN: Soft, nontender, nondistended, +BS  EXTREMITIES: No clubbing, cyanosis, or edema  NERVOUS SYSTEM:  A&Ox2-3    LABS:                        12.4   8.76  )-----------( 119      ( 20 Sep 2023 10:52 )             40.3     09-21    142  |  107  |  11  ----------------------------<  117<H>  3.5   |  24  |  0.51    Ca    8.2<L>      21 Sep 2023 00:17  Phos  2.5     09-20  Mg     2.1     09-20    TPro  5.5<L>  /  Alb  3.1<L>  /  TBili  0.9  /  DBili  x   /  AST  11  /  ALT  14  /  AlkPhos  81  09-21          Urinalysis Basic - ( 21 Sep 2023 00:17 )    Color: x / Appearance: x / SG: x / pH: x  Gluc: 117 mg/dL / Ketone: x  / Bili: x / Urobili: x   Blood: x / Protein: x / Nitrite: x   Leuk Esterase: x / RBC: x / WBC x   Sq Epi: x / Non Sq Epi: x / Bacteria: x          RADIOLOGY & ADDITIONAL TESTS:  Results Reviewed:   Imaging Personally Reviewed:  Electrocardiogram Personally Reviewed:  Tele:

## 2023-09-21 NOTE — PROGRESS NOTE ADULT - PROBLEM SELECTOR PLAN 1
Found to be hyperNa to 157 this admission. Likely in setting of poor PO intake in the last month prior to admission.   - s/p 1L LR in ED  - C/w 50cc/h LR  - Na 145 (9/20)   - Trend BMPs Found to be hyperNa to 157 this admission. Likely in setting of poor PO intake in the last month prior to admission.   - s/p 1L LR in ED  - d/c D5 as Na wnl  - Na 141 (9/21)   - Trend BMPs; pts Na normalized previously but kalie to 149 again w/ d/c of D5

## 2023-09-21 NOTE — PROGRESS NOTE ADULT - PROBLEM SELECTOR PLAN 4
Pt w/ decreased PO intake over past several weeks due to dysphagia. Per , eats regular food at baseline  - Purre diet w/ thick liquids per ss

## 2023-09-21 NOTE — PROGRESS NOTE ADULT - PROBLEM SELECTOR PLAN 2
Unclear of chronicity of UTI, but per , pt appeared more lethargic than baseline so will treat  - f/u Ucx  -start augmentin 500/125 as pt also found ot have sinusitis

## 2023-09-22 ENCOUNTER — TRANSCRIPTION ENCOUNTER (OUTPATIENT)
Age: 68
End: 2023-09-22

## 2023-09-22 LAB
-  AMIKACIN: SIGNIFICANT CHANGE UP
-  AMOXICILLIN/CLAVULANIC ACID: SIGNIFICANT CHANGE UP
-  AMPICILLIN/SULBACTAM: SIGNIFICANT CHANGE UP
-  AMPICILLIN: SIGNIFICANT CHANGE UP
-  AZTREONAM: SIGNIFICANT CHANGE UP
-  CEFAZOLIN: SIGNIFICANT CHANGE UP
-  CEFEPIME: SIGNIFICANT CHANGE UP
-  CEFOXITIN: SIGNIFICANT CHANGE UP
-  CEFTRIAXONE: SIGNIFICANT CHANGE UP
-  CEFUROXIME: SIGNIFICANT CHANGE UP
-  CIPROFLOXACIN: SIGNIFICANT CHANGE UP
-  ERTAPENEM: SIGNIFICANT CHANGE UP
-  GENTAMICIN: SIGNIFICANT CHANGE UP
-  IMIPENEM: SIGNIFICANT CHANGE UP
-  LEVOFLOXACIN: SIGNIFICANT CHANGE UP
-  MEROPENEM: SIGNIFICANT CHANGE UP
-  NITROFURANTOIN: SIGNIFICANT CHANGE UP
-  PIPERACILLIN/TAZOBACTAM: SIGNIFICANT CHANGE UP
-  TOBRAMYCIN: SIGNIFICANT CHANGE UP
-  TRIMETHOPRIM/SULFAMETHOXAZOLE: SIGNIFICANT CHANGE UP
ALBUMIN SERPL ELPH-MCNC: 2.8 G/DL — LOW (ref 3.3–5)
ALBUMIN SERPL ELPH-MCNC: 3.2 G/DL — LOW (ref 3.3–5)
ALP SERPL-CCNC: 80 U/L — SIGNIFICANT CHANGE UP (ref 40–120)
ALP SERPL-CCNC: 84 U/L — SIGNIFICANT CHANGE UP (ref 40–120)
ALT FLD-CCNC: 12 U/L — SIGNIFICANT CHANGE UP (ref 10–45)
ALT FLD-CCNC: 13 U/L — SIGNIFICANT CHANGE UP (ref 10–45)
ANION GAP SERPL CALC-SCNC: 11 MMOL/L — SIGNIFICANT CHANGE UP (ref 5–17)
ANION GAP SERPL CALC-SCNC: 8 MMOL/L — SIGNIFICANT CHANGE UP (ref 5–17)
AST SERPL-CCNC: 13 U/L — SIGNIFICANT CHANGE UP (ref 10–40)
AST SERPL-CCNC: 15 U/L — SIGNIFICANT CHANGE UP (ref 10–40)
BASOPHILS # BLD AUTO: 0 K/UL — SIGNIFICANT CHANGE UP (ref 0–0.2)
BASOPHILS # BLD AUTO: 0.02 K/UL — SIGNIFICANT CHANGE UP (ref 0–0.2)
BASOPHILS NFR BLD AUTO: 0 % — SIGNIFICANT CHANGE UP (ref 0–2)
BASOPHILS NFR BLD AUTO: 0.2 % — SIGNIFICANT CHANGE UP (ref 0–2)
BILIRUB SERPL-MCNC: 0.7 MG/DL — SIGNIFICANT CHANGE UP (ref 0.2–1.2)
BILIRUB SERPL-MCNC: 0.9 MG/DL — SIGNIFICANT CHANGE UP (ref 0.2–1.2)
BUN SERPL-MCNC: 5 MG/DL — LOW (ref 7–23)
BUN SERPL-MCNC: 6 MG/DL — LOW (ref 7–23)
CALCIUM SERPL-MCNC: 8.1 MG/DL — LOW (ref 8.4–10.5)
CALCIUM SERPL-MCNC: 8.3 MG/DL — LOW (ref 8.4–10.5)
CHLORIDE SERPL-SCNC: 107 MMOL/L — SIGNIFICANT CHANGE UP (ref 96–108)
CHLORIDE SERPL-SCNC: 110 MMOL/L — HIGH (ref 96–108)
CO2 SERPL-SCNC: 21 MMOL/L — LOW (ref 22–31)
CO2 SERPL-SCNC: 23 MMOL/L — SIGNIFICANT CHANGE UP (ref 22–31)
CREAT SERPL-MCNC: 0.46 MG/DL — LOW (ref 0.5–1.3)
CREAT SERPL-MCNC: 0.52 MG/DL — SIGNIFICANT CHANGE UP (ref 0.5–1.3)
CULTURE RESULTS: SIGNIFICANT CHANGE UP
EGFR: 100 ML/MIN/1.73M2 — SIGNIFICANT CHANGE UP
EGFR: 103 ML/MIN/1.73M2 — SIGNIFICANT CHANGE UP
EOSINOPHIL # BLD AUTO: 0.09 K/UL — SIGNIFICANT CHANGE UP (ref 0–0.5)
EOSINOPHIL # BLD AUTO: 0.19 K/UL — SIGNIFICANT CHANGE UP (ref 0–0.5)
EOSINOPHIL NFR BLD AUTO: 0.8 % — SIGNIFICANT CHANGE UP (ref 0–6)
EOSINOPHIL NFR BLD AUTO: 1.7 % — SIGNIFICANT CHANGE UP (ref 0–6)
GLUCOSE SERPL-MCNC: 141 MG/DL — HIGH (ref 70–99)
GLUCOSE SERPL-MCNC: 161 MG/DL — HIGH (ref 70–99)
HCT VFR BLD CALC: 35 % — SIGNIFICANT CHANGE UP (ref 34.5–45)
HCT VFR BLD CALC: 36.8 % — SIGNIFICANT CHANGE UP (ref 34.5–45)
HGB BLD-MCNC: 11.4 G/DL — LOW (ref 11.5–15.5)
HGB BLD-MCNC: 12.1 G/DL — SIGNIFICANT CHANGE UP (ref 11.5–15.5)
IMM GRANULOCYTES NFR BLD AUTO: 0.5 % — SIGNIFICANT CHANGE UP (ref 0–0.9)
LYMPHOCYTES # BLD AUTO: 0.47 K/UL — LOW (ref 1–3.3)
LYMPHOCYTES # BLD AUTO: 1.23 K/UL — SIGNIFICANT CHANGE UP (ref 1–3.3)
LYMPHOCYTES # BLD AUTO: 11.4 % — LOW (ref 13–44)
LYMPHOCYTES # BLD AUTO: 4.2 % — LOW (ref 13–44)
MAGNESIUM SERPL-MCNC: 2 MG/DL — SIGNIFICANT CHANGE UP (ref 1.6–2.6)
MANUAL SMEAR VERIFICATION: SIGNIFICANT CHANGE UP
MCHC RBC-ENTMCNC: 29.3 PG — SIGNIFICANT CHANGE UP (ref 27–34)
MCHC RBC-ENTMCNC: 29.3 PG — SIGNIFICANT CHANGE UP (ref 27–34)
MCHC RBC-ENTMCNC: 32.6 GM/DL — SIGNIFICANT CHANGE UP (ref 32–36)
MCHC RBC-ENTMCNC: 32.9 GM/DL — SIGNIFICANT CHANGE UP (ref 32–36)
MCV RBC AUTO: 89.1 FL — SIGNIFICANT CHANGE UP (ref 80–100)
MCV RBC AUTO: 90 FL — SIGNIFICANT CHANGE UP (ref 80–100)
METHOD TYPE: SIGNIFICANT CHANGE UP
MONOCYTES # BLD AUTO: 0.19 K/UL — SIGNIFICANT CHANGE UP (ref 0–0.9)
MONOCYTES # BLD AUTO: 0.42 K/UL — SIGNIFICANT CHANGE UP (ref 0–0.9)
MONOCYTES NFR BLD AUTO: 1.7 % — LOW (ref 2–14)
MONOCYTES NFR BLD AUTO: 3.9 % — SIGNIFICANT CHANGE UP (ref 2–14)
NEUTROPHILS # BLD AUTO: 10.27 K/UL — HIGH (ref 1.8–7.4)
NEUTROPHILS # BLD AUTO: 8.98 K/UL — HIGH (ref 1.8–7.4)
NEUTROPHILS NFR BLD AUTO: 83.2 % — HIGH (ref 43–77)
NEUTROPHILS NFR BLD AUTO: 89.1 % — HIGH (ref 43–77)
NEUTS BAND # BLD: 3.3 % — SIGNIFICANT CHANGE UP (ref 0–8)
NRBC # BLD: 0 /100 WBCS — SIGNIFICANT CHANGE UP (ref 0–0)
ORGANISM # SPEC MICROSCOPIC CNT: SIGNIFICANT CHANGE UP
ORGANISM # SPEC MICROSCOPIC CNT: SIGNIFICANT CHANGE UP
PHOSPHATE SERPL-MCNC: 2 MG/DL — LOW (ref 2.5–4.5)
PLAT MORPH BLD: NORMAL — SIGNIFICANT CHANGE UP
PLATELET # BLD AUTO: 109 K/UL — LOW (ref 150–400)
PLATELET # BLD AUTO: 126 K/UL — LOW (ref 150–400)
POTASSIUM SERPL-MCNC: 3.9 MMOL/L — SIGNIFICANT CHANGE UP (ref 3.5–5.3)
POTASSIUM SERPL-MCNC: 4.2 MMOL/L — SIGNIFICANT CHANGE UP (ref 3.5–5.3)
POTASSIUM SERPL-SCNC: 3.9 MMOL/L — SIGNIFICANT CHANGE UP (ref 3.5–5.3)
POTASSIUM SERPL-SCNC: 4.2 MMOL/L — SIGNIFICANT CHANGE UP (ref 3.5–5.3)
PROT SERPL-MCNC: 5.4 G/DL — LOW (ref 6–8.3)
PROT SERPL-MCNC: 5.7 G/DL — LOW (ref 6–8.3)
RBC # BLD: 3.89 M/UL — SIGNIFICANT CHANGE UP (ref 3.8–5.2)
RBC # BLD: 4.13 M/UL — SIGNIFICANT CHANGE UP (ref 3.8–5.2)
RBC # FLD: 14.2 % — SIGNIFICANT CHANGE UP (ref 10.3–14.5)
RBC # FLD: 14.3 % — SIGNIFICANT CHANGE UP (ref 10.3–14.5)
RBC BLD AUTO: SIGNIFICANT CHANGE UP
SODIUM SERPL-SCNC: 139 MMOL/L — SIGNIFICANT CHANGE UP (ref 135–145)
SODIUM SERPL-SCNC: 141 MMOL/L — SIGNIFICANT CHANGE UP (ref 135–145)
SPECIMEN SOURCE: SIGNIFICANT CHANGE UP
WBC # BLD: 10.79 K/UL — HIGH (ref 3.8–10.5)
WBC # BLD: 11.11 K/UL — HIGH (ref 3.8–10.5)
WBC # FLD AUTO: 10.79 K/UL — HIGH (ref 3.8–10.5)
WBC # FLD AUTO: 11.11 K/UL — HIGH (ref 3.8–10.5)

## 2023-09-22 PROCEDURE — 99232 SBSQ HOSP IP/OBS MODERATE 35: CPT | Mod: GC

## 2023-09-22 RX ORDER — POTASSIUM PHOSPHATE, MONOBASIC POTASSIUM PHOSPHATE, DIBASIC 236; 224 MG/ML; MG/ML
15 INJECTION, SOLUTION INTRAVENOUS ONCE
Refills: 0 | Status: COMPLETED | OUTPATIENT
Start: 2023-09-22 | End: 2023-09-22

## 2023-09-22 RX ADMIN — Medication 500 MILLIGRAM(S): at 22:26

## 2023-09-22 RX ADMIN — Medication 1 TABLET(S): at 13:09

## 2023-09-22 RX ADMIN — Medication 112 MICROGRAM(S): at 05:35

## 2023-09-22 RX ADMIN — Medication 500 MILLIGRAM(S): at 13:08

## 2023-09-22 RX ADMIN — Medication 1 MILLIGRAM(S): at 13:08

## 2023-09-22 RX ADMIN — ENOXAPARIN SODIUM 40 MILLIGRAM(S): 100 INJECTION SUBCUTANEOUS at 05:35

## 2023-09-22 RX ADMIN — Medication 500 MILLIGRAM(S): at 00:09

## 2023-09-22 RX ADMIN — POTASSIUM PHOSPHATE, MONOBASIC POTASSIUM PHOSPHATE, DIBASIC 62.5 MILLIMOLE(S): 236; 224 INJECTION, SOLUTION INTRAVENOUS at 13:08

## 2023-09-22 RX ADMIN — Medication 500 MILLIGRAM(S): at 13:09

## 2023-09-22 RX ADMIN — Medication 500 MILLIGRAM(S): at 05:36

## 2023-09-22 RX ADMIN — TAMOXIFEN CITRATE 20 MILLIGRAM(S): 20 TABLET, FILM COATED ORAL at 13:08

## 2023-09-22 NOTE — PROGRESS NOTE ADULT - SUBJECTIVE AND OBJECTIVE BOX
*******************************  Eleazar Miller MD (PGY-1)  Internal Medicine  Contact via [employer] TEAMS  *******************************    PROGRESS NOTE:     Patient is a 68y old  Female who presents with a chief complaint of hypernatremia (21 Sep 2023 06:52)      INTERVAL EVENTS: No acute overnight events.     SUBJECTIVE: Patient seen and examined at bedside. This morning, the patient is comfortable and doing well. No acute complaints. Denies fevers, chills, N/V/D, chest pain, SOB, abdominal pain.    MEDICATIONS  (STANDING):  amoxicillin   50 mG/mL/clavulanate Suspension 500 milliGRAM(s) Oral every 8 hours  ascorbic acid 500 milliGRAM(s) Oral daily  dextrose 5%. 500 milliLiter(s) (50 mL/Hr) IV Continuous <Continuous>  enoxaparin Injectable 40 milliGRAM(s) SubCutaneous every 24 hours  folic acid 1 milliGRAM(s) Oral daily  levothyroxine 112 MICROGram(s) Oral daily  multivitamin 1 Tablet(s) Oral daily  tamoxifen 20 milliGRAM(s) Oral daily    MEDICATIONS  (PRN):      CAPILLARY BLOOD GLUCOSE        I&O's Summary    20 Sep 2023 07:01  -  21 Sep 2023 07:00  --------------------------------------------------------  IN: 0 mL / OUT: 650 mL / NET: -650 mL    21 Sep 2023 07:01  -  22 Sep 2023 06:25  --------------------------------------------------------  IN: 1520 mL / OUT: 1050 mL / NET: 470 mL        PHYSICAL EXAM:  Vital Signs Last 24 Hrs  T(C): 37.2 (22 Sep 2023 05:43), Max: 37.2 (21 Sep 2023 20:50)  T(F): 99 (22 Sep 2023 05:43), Max: 99 (22 Sep 2023 05:43)  HR: 72 (22 Sep 2023 05:43) (72 - 73)  BP: 99/55 (22 Sep 2023 05:43) (99/55 - 115/71)  BP(mean): --  RR: 18 (22 Sep 2023 05:43) (18 - 18)  SpO2: 92% (22 Sep 2023 05:43) (92% - 94%)    Parameters below as of 22 Sep 2023 05:43  Patient On (Oxygen Delivery Method): room air        GENERAL: NAD, lying in bed comfortably  HEAD: Atraumatic, normocephalic  EYES: EOMI, PERRLA  ENT: Moist mucous membranes  NECK: Supple, no JVD  HEART: S1, S2, Regular rate and rhythm, no murmurs, rubs, or gallops  LUNGS: Unlabored respirations, clear to auscultation bilaterally, no crackles, wheezing, or rhonchi  ABDOMEN: Soft, nontender, nondistended, +BS  EXTREMITIES: No clubbing, cyanosis, or edema  NERVOUS SYSTEM:  A&Ox3, no focal deficits   SKIN: No rashes or lesions    LABS:                        11.5   6.57  )-----------( 128      ( 21 Sep 2023 07:26 )             35.8     09-22    141  |  110<H>  |  6<L>  ----------------------------<  161<H>  4.2   |  23  |  0.52    Ca    8.3<L>      22 Sep 2023 00:38  Phos  2.4     09-21  Mg     2.1     09-21    TPro  5.4<L>  /  Alb  2.8<L>  /  TBili  0.7  /  DBili  x   /  AST  15  /  ALT  12  /  AlkPhos  80  09-22          Urinalysis Basic - ( 22 Sep 2023 00:38 )    Color: x / Appearance: x / SG: x / pH: x  Gluc: 161 mg/dL / Ketone: x  / Bili: x / Urobili: x   Blood: x / Protein: x / Nitrite: x   Leuk Esterase: x / RBC: x / WBC x   Sq Epi: x / Non Sq Epi: x / Bacteria: x          RADIOLOGY & ADDITIONAL TESTS:  Results Reviewed:   Imaging Personally Reviewed:  Electrocardiogram Personally Reviewed:  Tele: *******************************  Eleazar Miller MD (PGY-1)  Internal Medicine  Contact via [employer] TEAMS  *******************************    PROGRESS NOTE:     Patient is a 68y old  Female who presents with a chief complaint of hypernatremia (21 Sep 2023 06:52)      INTERVAL EVENTS: No acute overnight events.     SUBJECTIVE: Patient seen and examined at bedside. Unable to obtain ROS.    MEDICATIONS  (STANDING):  amoxicillin   50 mG/mL/clavulanate Suspension 500 milliGRAM(s) Oral every 8 hours  ascorbic acid 500 milliGRAM(s) Oral daily  dextrose 5%. 500 milliLiter(s) (50 mL/Hr) IV Continuous <Continuous>  enoxaparin Injectable 40 milliGRAM(s) SubCutaneous every 24 hours  folic acid 1 milliGRAM(s) Oral daily  levothyroxine 112 MICROGram(s) Oral daily  multivitamin 1 Tablet(s) Oral daily  tamoxifen 20 milliGRAM(s) Oral daily    MEDICATIONS  (PRN):      CAPILLARY BLOOD GLUCOSE        I&O's Summary    20 Sep 2023 07:01  -  21 Sep 2023 07:00  --------------------------------------------------------  IN: 0 mL / OUT: 650 mL / NET: -650 mL    21 Sep 2023 07:01  -  22 Sep 2023 06:25  --------------------------------------------------------  IN: 1520 mL / OUT: 1050 mL / NET: 470 mL        PHYSICAL EXAM:  Vital Signs Last 24 Hrs  T(C): 37.2 (22 Sep 2023 05:43), Max: 37.2 (21 Sep 2023 20:50)  T(F): 99 (22 Sep 2023 05:43), Max: 99 (22 Sep 2023 05:43)  HR: 72 (22 Sep 2023 05:43) (72 - 73)  BP: 99/55 (22 Sep 2023 05:43) (99/55 - 115/71)  BP(mean): --  RR: 18 (22 Sep 2023 05:43) (18 - 18)  SpO2: 92% (22 Sep 2023 05:43) (92% - 94%)    Parameters below as of 22 Sep 2023 05:43  Patient On (Oxygen Delivery Method): room air        GENERAL: NAD, lying in bed comfortably  HEAD: Atraumatic, normocephalic  EYES: EOMI, PERRLA  ENT: Moist mucous membranes  NECK: Supple, no JVD  HEART: S1, S2, Regular rate and rhythm, no murmurs, rubs, or gallops  LUNGS: Unlabored respirations, clear to auscultation bilaterally, no crackles, wheezing, or rhonchi  ABDOMEN: Soft, nontender, nondistended, +BS  EXTREMITIES: No clubbing, cyanosis, or edema  NERVOUS SYSTEM:  A&Ox0, no focal deficits   SKIN: No rashes or lesions    LABS:                        11.5   6.57  )-----------( 128      ( 21 Sep 2023 07:26 )             35.8     09-22    141  |  110<H>  |  6<L>  ----------------------------<  161<H>  4.2   |  23  |  0.52    Ca    8.3<L>      22 Sep 2023 00:38  Phos  2.4     09-21  Mg     2.1     09-21    TPro  5.4<L>  /  Alb  2.8<L>  /  TBili  0.7  /  DBili  x   /  AST  15  /  ALT  12  /  AlkPhos  80  09-22          Urinalysis Basic - ( 22 Sep 2023 00:38 )    Color: x / Appearance: x / SG: x / pH: x  Gluc: 161 mg/dL / Ketone: x  / Bili: x / Urobili: x   Blood: x / Protein: x / Nitrite: x   Leuk Esterase: x / RBC: x / WBC x   Sq Epi: x / Non Sq Epi: x / Bacteria: x          RADIOLOGY & ADDITIONAL TESTS:  Results Reviewed:   Imaging Personally Reviewed:  Electrocardiogram Personally Reviewed:  Tele:

## 2023-09-22 NOTE — PROGRESS NOTE ADULT - PROBLEM SELECTOR PLAN 4
Pt w/ decreased PO intake over past several weeks due to dysphagia. Per , eats regular food at baseline  - Purre diet w/ thick liquids per ss Pt w/ decreased PO intake over past several weeks due to dysphagia. Per , eats regular food at baseline  - Purre diet w/ thick liquids per ss; difficulty swallowing 9/22 AM f/u repeat SS eval

## 2023-09-22 NOTE — DISCHARGE NOTE PROVIDER - HOSPITAL COURSE
Discharge Summary     Discharge diagnoses:   Hypernatremia    Hospital Course:   For full details, please see H&P, progress notes, consult notes and ancillary notes. Briefly, HPI:  This is a 67yo/F w/ PMHx breast cancer s/p mastectomy now on tamoxifen, aphasia s/p TBI, meningioma, hypothyroidism (hx of Grave's now s/p radioactive iodine) who presented to hospital for fingernail laceration.  was cutting her nails in the dark when it was found that she was bleeding from her nailbeds. In the ED, was given Tdap and did prelim labs. Labs significant for hypernatremia to 157, so admitted to medicine. Per , pt has appeared more tired and had difficulty swallowing (and pocketing food) more in the last month. As a result, pt hasn't osmin much po intake in the last month. However, she hasn't made any complaints to him: no abd pain, no dysuria.     In the ED, she was given Tdap for the cut fingernails. Labs were significant for Na 157. Admitted to medicine for further workup.  (19 Sep 2023 02:24)  .     The patient's hospital course will be summarized in a problem based format.    # Hypernatremia  Found to be hyperNa to 157 in ED. Likely in setting of poor PO intake in the last month prior to admission as  reports decreased PO. Pt was initially started on LR 50cc/hr without improvement in serum Na. Switched to D5 50cc/hr and Na improved to 145 but kalie to 149 after D5 was held. Pt restarted on D5 and Na dropped to 139 on day of D/c.    # Acute UTI  Pt w/ positive UA on admission. Unclear of chronicity of UTI, but per , pt appeared more lethargic than baseline so was treated.  Pt was started on ceftriaxone 1g qd 2x days. Was switched to Augmentin 500/125 as found to also have sinusitis. Will be d/celine on 3x days of Augmentin.   -start Augmentin 500/125 as pt also found ot have sinusitis.    # Sinusitis  Pt w/ evidence of sinusitis on CTH. Was started on Augmentin 500/125 q8h. Will be d/celine on 3 days of Augmentin.     # Dysphagia    Pt w/ decreased PO intake over past several weeks due to dysphagia. Per , eats regular food at baseline. Was seen by speech and swallow. Started on purred diet w/ thickened liquids. A CTH was performed to r/o acute intracranial pathology and was negative but showed signs of sinusitis.     On day of discharge, patient is clinically stable with no new exam findings or acute symptoms compared to prior. The patient was seen by the attending physician on the date of discharge and deemed stable and acceptable for discharge.     Discharge follow up action items:     Discharge Summary     Discharge diagnoses:   Hypernatremia    Hospital Course:   For full details, please see H&P, progress notes, consult notes and ancillary notes. Briefly, HPI:  This is a 67yo/F w/ PMHx breast cancer s/p mastectomy now on tamoxifen, aphasia s/p TBI, meningioma, hypothyroidism (hx of Grave's now s/p radioactive iodine) who presented to hospital for fingernail laceration.  was cutting her nails in the dark when it was found that she was bleeding from her nailbeds. In the ED, was given Tdap and did prelim labs. Labs significant for hypernatremia to 157, so admitted to medicine. Per , pt has appeared more tired and had difficulty swallowing (and pocketing food) more in the last month. As a result, pt hasn't osmin much po intake in the last month. However, she hasn't made any complaints to him: no abd pain, no dysuria.     In the ED, she was given Tdap for the cut fingernails. Labs were significant for Na 157. Admitted to medicine for further workup.  (19 Sep 2023 02:24)  .     The patient's hospital course will be summarized in a problem based format.    # Hypernatremia  Found to be hyperNa to 157 in ED. Likely in setting of poor PO intake in the last month prior to admission as  reports decreased PO. Pt was initially started on LR 50cc/hr without improvement in serum Na. Switched to D5 50cc/hr and Na improved to 145 but kalie to 149 after D5 was held. Pt restarted on D5 and Na dropped to 141 on day of D/c.    # Acute UTI  Pt w/ positive UA on admission. Unclear of chronicity of UTI, but per , pt appeared more lethargic than baseline so was treated.  Pt was started on ceftriaxone 1g qd 2x days. Was switched to Augmentin 500/125 as found to also have sinusitis. Will be d/celine on 3x days of Augmentin.   -start Augmentin 500/125 as pt also found ot have sinusitis.    # Sinusitis  Pt w/ evidence of sinusitis on CTH. Was started on Augmentin 500/125 q8h. Will be d/celine on 3 days of Augmentin.     # Dysphagia    Pt w/ decreased PO intake over past several weeks due to dysphagia. Per , eats regular food at baseline. Was seen by speech and swallow. Started on purred diet w/ thickened liquids. A CTH was performed to r/o acute intracranial pathology and was negative but showed signs of sinusitis.     On day of discharge, patient is clinically stable with no new exam findings or acute symptoms compared to prior. The patient was seen by the attending physician on the date of discharge and deemed stable and acceptable for discharge.     Discharge follow up action items:     1. Follow up with PCP within the next 1-2 weeks.   2. Medication changes: Start augmentin 500/125mg q8h x3 days.     Discharge Summary     Discharge diagnoses:   Hypernatremia    Hospital Course:   For full details, please see H&P, progress notes, consult notes and ancillary notes. Briefly, HPI:  This is a 69yo/F w/ PMHx breast cancer s/p mastectomy now on tamoxifen, aphasia s/p TBI, meningioma, hypothyroidism (hx of Grave's now s/p radioactive iodine) who presented to hospital for fingernail laceration.  was cutting her nails in the dark when it was found that she was bleeding from her nailbeds. In the ED, was given Tdap and did prelim labs. Labs significant for hypernatremia to 157, so admitted to medicine. Per , pt has appeared more tired and had difficulty swallowing (and pocketing food) more in the last month. As a result, pt hasn't osmin much po intake in the last month. However, she hasn't made any complaints to him: no abd pain, no dysuria.     In the ED, she was given Tdap for the cut fingernails. Labs were significant for Na 157. Admitted to medicine for further workup.  (19 Sep 2023 02:24)  .     The patient's hospital course will be summarized in a problem based format.    # Hypernatremia  Found to be hyperNa to 157 in ED. Likely in setting of poor PO intake in the last month prior to admission as  reports decreased PO. Pt was initially started on LR 50cc/hr without improvement in serum Na. Switched to D5 50cc/hr and Na improved to 145 but kalie to 149 after D5 was held. Pt restarted on D5 and Na dropped to 141 on day of D/c. Will need f/u w/ PCP to check na after DC.    #Thrombocytopenia  Pt found to have plts 100s-130s during stay. Thought to be reactive 2/2 sinusitis and UTI. Pt will need f/u w/ PCP to check plts levels after DC.    # Acute UTI  Pt w/ positive UA on admission. Unclear of chronicity of UTI, but per , pt appeared more lethargic than baseline so was treated.  Pt was started on ceftriaxone 1g qd 2x days. Was switched to Augmentin 500/125 as found to also have sinusitis. Will be d/celine on 3x days of Augmentin.   -start Augmentin 500/125 as pt also found ot have sinusitis.    # Sinusitis  Pt w/ evidence of sinusitis on CTH. Was started on Augmentin 500/125 q8h. Will be d/celine on 3 days of Augmentin.     # Dysphagia    Pt w/ decreased PO intake over past several weeks due to dysphagia. Per , eats regular food at baseline. Was seen by speech and swallow. Started on purred diet w/ thickened liquids. A CTH was performed to r/o acute intracranial pathology and was negative but showed signs of sinusitis. Will be dced with scripts for outpt PT, OT, and swallow therapy.     On day of discharge, patient is clinically stable with no new exam findings or acute symptoms compared to prior. The patient was seen by the attending physician on the date of discharge and deemed stable and acceptable for discharge.     Discharge follow up action items:     1. Follow up with PCP within the next 1-2 weeks. Will need recheck of Na, Plts.   2. Medication changes: Start augmentin 500/125mg q8h x3 days.     Discharge Summary     Discharge diagnoses:   Hypernatremia  HPI:  This is a 67yo/F w/ PMHx breast cancer s/p mastectomy now on tamoxifen, aphasia s/p TBI, meningioma, hypothyroidism (hx of Grave's now s/p radioactive iodine) who presented to hospital for fingernail laceration.  was cutting her nails in the dark when it was found that she was bleeding from her nailbeds. In the ED, was given Tdap and did prelim labs. Labs significant for hypernatremia to 157, so admitted to medicine. Per , pt has appeared more tired and had difficulty swallowing (and pocketing food) more in the last month. As a result, pt hasn't osmin much po intake in the last month. However, she hasn't made any complaints to him: no abd pain, no dysuria.     In the ED, she was given Tdap for the cut fingernails. Labs were significant for Na 157. Admitted to medicine for further workup.  (19 Sep 2023 02:24)    Hospital Course:  The patient's hospital course will be summarized in a problem based format.    # Hypernatremia  Found to be hyperNa to 157 in ED. Likely in setting of poor PO intake in the last month prior to admission as  reports decreased PO. Pt was initially started on LR 50cc/hr without improvement in serum Na. Switched to D5 50cc/hr and Na improved to 145 but kalie to 149 after D5 was held. Pt restarted on D5 and Na dropped to 141 on day of D/c. Will need f/u w/ PCP to check na after DC.    #Thrombocytopenia  Pt found to have plts 100s-130s during stay. Thought to be reactive 2/2 sinusitis and UTI. Pt will need f/u w/ PCP to check plts levels after DC.    # Acute UTI  Pt w/ positive UA on admission. Unclear of chronicity of UTI, but per , pt appeared more lethargic than baseline so was treated.  Pt was started on ceftriaxone 1g qd 2x days. Was switched to Augmentin 500/125 as found to also have sinusitis. Will be d/celine on 3x days of Augmentin.   -start Augmentin 500/125 as pt also found ot have sinusitis.    # Sinusitis  Pt w/ evidence of sinusitis on CTH. Was started on Augmentin 500/125 q8h. Will be d/celine on 3 days of Augmentin.     # Dysphagia    Pt w/ decreased PO intake over past several weeks due to dysphagia. Per , eats regular food at baseline. Was seen by speech and swallow. Started on purred diet w/ thickened liquids. A CTH was performed to r/o acute intracranial pathology and was negative but showed signs of sinusitis. Will be dced with scripts for outpt PT, OT, and swallow therapy.     On day of discharge, patient is clinically stable with no new exam findings or acute symptoms compared to prior. The patient was seen by the attending physician on the date of discharge and deemed stable and acceptable for discharge.       Important Medication Changes and Reason:  Augmentin for x 3 days as above     Active or Pending Issues Requiring Follow-up  1. Follow up with PCP within the next 1-2 weeks. Will need recheck of Na, Plts.   2. Medication changes: Start augmentin 500/125mg q8h x3 days.   PT OT speech outpatient    Advanced Directives:   [x ] Full code  [ ] DNR  [ ] Hospice    Discharge Diagnoses:

## 2023-09-22 NOTE — DISCHARGE NOTE PROVIDER - NSDCCPCAREPLAN_GEN_ALL_CORE_FT
PRINCIPAL DISCHARGE DIAGNOSIS  Diagnosis: Hypernatremia  Assessment and Plan of Treatment: You came to the hospital for lacerations on your finger tips. While in the emergency department you were found to have hypernatremia. This is when the sodium levels in your blood are too high. This likely occured from decreased eating and drinking. You were given IV fluids and the problem corrected.   Please follow up with your primary care provider within the next 2 weeks.   Please return to the emergency department if you experince increased confusion, lethargy, loss of consiousness, inability to tolerate food or drink , chest pain, shortness of breath.      SECONDARY DISCHARGE DIAGNOSES  Diagnosis: Sinusitis  Assessment and Plan of Treatment: While in the hospital you were found to have sinusitis. This is an infection of your sinuses. YOu were treated with the medication augmentin. YOu will be discharged on augmentin; please continue to take augmentin 500/125mg every 8 hours for the next 3 days. You were also found ot have a urinary tract infection. You were orignially treated with the medication ceftriaxone which was switched to augmentin when you were found to have sinusitis.   Please follow up with your primary care provier within the next 1-2 weeks.  Return to the emergency departemnt if you experinces severe headache, loss of concousness, chest pain, shortness of breath, high fever.    Diagnosis: Dysphagia  Assessment and Plan of Treatment: You have a history of dysphagia. This is difficulty with swallowing. You were seen by rogelio who reccomended a pureed diet and thickened liquids. Please use conitue with a pureed diet and thickened liquids and assistance with eating/drinking. You were also seen by physical therapy hwo reccomended outpatient PT. You will be discahrged with a script for home PT.   Please follow up with your PCP within the next 1-2 weeks.   Please return to the emergency depoartment if you expeince inability to tolerate food or drink, shortenss of breath, chest pain.     PRINCIPAL DISCHARGE DIAGNOSIS  Diagnosis: Hypernatremia  Assessment and Plan of Treatment: You came to the hospital for lacerations on your finger tips. While in the emergency department you were found to have hypernatremia. This is when the sodium levels in your blood are too high. This likely occured from decreased eating and drinking. You were given IV fluids and the problem corrected. Please continue to encourage oral intake to ensure your sodium does not rise again.   Please follow up with your primary care provider within the next 2 weeks to check your sodium levels.   Please return to the emergency department if you experince increased confusion, lethargy, loss of consiousness, inability to tolerate food or drink , chest pain, shortness of breath.      SECONDARY DISCHARGE DIAGNOSES  Diagnosis: Sinusitis  Assessment and Plan of Treatment: While in the hospital you were found to have sinusitis. This is an infection of your sinuses. YOu were treated with the medication augmentin. YOu will be discharged on augmentin; please continue to take augmentin 500/125mg every 8 hours for the next 3 days. You were also found ot have a urinary tract infection. You were orignially treated with the medication ceftriaxone which was switched to augmentin when you were found to have sinusitis.   Please follow up with your primary care provier within the next 1-2 weeks.  Return to the emergency departemnt if you experinces severe headache, loss of concousness, chest pain, shortness of breath, high fever.    Diagnosis: Dysphagia  Assessment and Plan of Treatment: You have a history of dysphagia. This is difficulty with swallowing. You were seen by rogelio who reccomended a pureed diet and thickened liquids. Please use conitue with a pureed diet and thickened liquids and assistance with eating/drinking, and ensure you are sitting upright when eating. You were also seen by physical therapy hwo reccomended outpatient PT. You will be discahrged with a script for outpatient PT, OT, and swallow therapy.   Please follow up with your PCP within the next 1-2 weeks.   Please return to the emergency depoartment if you expeince inability to tolerate food or drink, shortenss of breath, chest pain.

## 2023-09-22 NOTE — PROGRESS NOTE ADULT - ASSESSMENT
This is a 67yo/F w/ PMHx breast cancer s/p mastectomy now on tamoxifen, aphasia s/p TBI, meningioma, hypothyroidism (hx of Grave's now s/p radioactive iodine) who presented to hospital for fingernail laceration. Found to be hypernatremic to 157; now w/ Na 145 This is a 69yo/F w/ PMHx breast cancer s/p mastectomy now on tamoxifen, aphasia s/p TBI, meningioma, hypothyroidism (hx of Grave's now s/p radioactive iodine) who presented to hospital for fingernail laceration. Found to be hypernatremic to 157; now w/ Na 139

## 2023-09-22 NOTE — PROGRESS NOTE ADULT - PROBLEM SELECTOR PLAN 1
Found to be hyperNa to 157 this admission. Likely in setting of poor PO intake in the last month prior to admission.   - s/p 1L LR in ED  - d/c D5 as Na wnl  - Na 141 (9/21)   - Trend BMPs; pts Na normalized previously but kalie to 149 again w/ d/c of D5 Found to be hyperNa to 157 this admission. Likely in setting of poor PO intake in the last month prior to admission.   - s/p 1L LR in ED  - d/c D5 as Na wnl  - Na 139 (9/21)   - Trend BMPs; pts Na normalized previously but kalie to 149 again w/ d/c of D5

## 2023-09-22 NOTE — DISCHARGE NOTE PROVIDER - CARE PROVIDER_API CALL
Silvina Herrera  Internal Medicine  [address] [place] [address]  Phone: [phone]  Fax: [phone]  Follow Up Time:

## 2023-09-22 NOTE — DISCHARGE NOTE PROVIDER - NSDCMRMEDTOKEN_GEN_ALL_CORE_FT
folic acid 1 mg oral tablet: 1 tab(s) orally once a day  levothyroxine 112 mcg (0.112 mg) oral tablet: 1 tab(s) orally once a day  Multiple Vitamins oral tablet: 1 tab(s) orally once a day  tamoxifen 20 mg oral tablet: 1 tab(s) orally once a day   Augmentin 250 mg-62.5 mg/5 mL oral liquid: 10 milliliter(s) orally every 8 hours 10 ml every 8 hours for 3 days  folic acid 1 mg oral tablet: 1 tab(s) orally once a day  levothyroxine 112 mcg (0.112 mg) oral tablet: 1 tab(s) orally once a day  Multiple Vitamins oral tablet: 1 tab(s) orally once a day  Outpatient occupational therapy : ICD10: R53.1  Dx: Weakness  OT 1-2 times per week for 4 weeks  Outpatient physical therapy : ICD10: R53.1  Dx: Weakness  PT 1-2 times per week for 4 weeks  Outpatient speech language pathologist for swallowing : ICD10: R13.10  Dx: Dysphagia  tamoxifen 20 mg oral tablet: 1 tab(s) orally once a day   Augmentin 250 mg-62.5 mg/5 mL oral liquid: 10 milliliter(s) orally every 8 hours 10 ml every 8 hours for 3 days  folic acid 1 mg oral tablet: 1 tab(s) orally once a day  levothyroxine 112 mcg (0.112 mg) oral tablet: 1 tab(s) orally once a day  Multiple Vitamins oral tablet: 1 tab(s) orally once a day  Outpatient occupational therapy: ICD10: R53.1  Dx: Weakness  OT 1-2 times per week for 4 weeks  Outpatient physical therapy: ICD10: R53.1  Dx: Weakness  PT 1-2 times per week for 4 weeks  Outpatient speech language pathologist for swallowing: ICD10: R13.10  Dx: Dysphagia  tamoxifen 20 mg oral tablet: 1 tab(s) orally once a day

## 2023-09-22 NOTE — PROGRESS NOTE ADULT - PROBLEM SELECTOR PLAN 2
Unclear of chronicity of UTI, but per , pt appeared more lethargic than baseline so will treat  - f/u Ucx  -start augmentin 500/125 as pt also found ot have sinusitis Unclear of chronicity of UTI, but per , pt appeared more lethargic than baseline so will treat  - f/u Ucx  - start augmentin 500/125 as pt also found to have sinusitis

## 2023-09-22 NOTE — DISCHARGE NOTE PROVIDER - DATE OF DISCHARGE SERVICE:
Problem: Safety  Goal: Will remain free from falls  Outcome: PROGRESSING AS EXPECTED  Call light w/in reach. Instructed pt to call for assistance before getting OOB- pt verbalized understanding.      Problem: Knowledge Deficit  Goal: Knowledge of disease process/condition, treatment plan, diagnostic tests, and medications will improve  Outcome: PROGRESSING AS EXPECTED   POC discussed- pt and family verbalized understanding   23-Sep-2023

## 2023-09-22 NOTE — DISCHARGE NOTE PROVIDER - DETAILS OF MALNUTRITION DIAGNOSIS/DIAGNOSES
This patient has been assessed with a concern for Malnutrition and was treated during this hospitalization for the following Nutrition diagnosis/diagnoses:     -  09/20/2023: Moderate protein-calorie malnutrition   -  09/20/2023: Underweight (BMI < 19)

## 2023-09-22 NOTE — PROGRESS NOTE ADULT - ATTENDING COMMENTS
69 yo F PMH TBI admitted for metabolic encephalopathy, hypernatremia.    Metabolic encephalopathy  - Likely due to hypernatremia from poor po intake, UTI  - TSH WNL  - CTH w/ Right maxillary and sphenoid acute sinusitis. Augmentin x 5 days  - Able to mouth some words and follow simple commands today     Hypernatremia  - Patient appears to have been declining over a few weeks at home with poor po intake  - S/S: puree + mod thick liquids   - evening chem yesterday w/ Na 149, requiring re-administration D5+water. This , repeat afternoon BMP off IVF     UTI  - UA +, turbid  - f/u ucx – growing E. coli. Should be covered by augmentin     Rest as above.
69 yo F PMH TBI admitted for metabolic encephalopathy, hypernatremia.    Metabolic encephalopathy  - Likely due to hypernatremia from poor po intake, UTI  - TSH WNL  - CTH w/ Right maxillary and sphenoid acute sinusitis. Augmentin x 5 days  - Was improved yesterday but sleeping more today. Leukocytosis noted - repeat cbc. If elevated repeat infectious w/u      Hypernatremia  - Patient appears to have been declining over a few weeks at home with poor po intake  - S/S: puree + mod thick liquids   - Monitoring BMP,  intermittently  requiring D5+water. May need PEG if unable to recover to prior baseline     UTI  - UA +, turbid  - ucx – growing E. coli. sensitive to augmentin     Rest as above.
67 yo F PMH TBI admitted  for metabolic encephalopathy, hypernatremia.    Metabolic  encephalopathy  - Likely due to hypernatremia from  poor po intake, UTI  - TSH WNL  - obtain CTH    Hypernatremia  - Patient appears to have been declining over a few weeks at home with poor po intake  - Switch IVF to D5+free water. Trend bmp q6-8  - May need PEG tube if mental status does not improve if within GOC    UTI  - UA +, turbid  - f/u ucx  - empiric ceftriaxone     Rest as above.
67 yo F PMH TBI admitted for metabolic encephalopathy, hypernatremia.    Metabolic encephalopathy  - Likely due to hypernatremia from poor po intake, UTI  - TSH WNL  - CTH w/ Right maxillary and sphenoid acute sinusitis. Can switch abx to Augmentin x 5 days    Hypernatremia  - Patient appears to have been declining over a few weeks at home with poor po intake  - Now resolved after D5+water. Can continue LR for now, recheck BMP  - S/S: puree + mod thick liquids. Per RN not eating much today, monitor     UTI  - UA +, turbid  - f/u ucx. Likely covered by augmentin     Rest as above.

## 2023-09-23 ENCOUNTER — TRANSCRIPTION ENCOUNTER (OUTPATIENT)
Age: 68
End: 2023-09-23

## 2023-09-23 VITALS
RESPIRATION RATE: 18 BRPM | HEART RATE: 78 BPM | DIASTOLIC BLOOD PRESSURE: 70 MMHG | SYSTOLIC BLOOD PRESSURE: 103 MMHG | OXYGEN SATURATION: 96 % | TEMPERATURE: 98 F

## 2023-09-23 LAB
ANION GAP SERPL CALC-SCNC: 12 MMOL/L — SIGNIFICANT CHANGE UP (ref 5–17)
BUN SERPL-MCNC: 6 MG/DL — LOW (ref 7–23)
CALCIUM SERPL-MCNC: 8.2 MG/DL — LOW (ref 8.4–10.5)
CHLORIDE SERPL-SCNC: 108 MMOL/L — SIGNIFICANT CHANGE UP (ref 96–108)
CO2 SERPL-SCNC: 21 MMOL/L — LOW (ref 22–31)
CREAT SERPL-MCNC: 0.53 MG/DL — SIGNIFICANT CHANGE UP (ref 0.5–1.3)
EGFR: 99 ML/MIN/1.73M2 — SIGNIFICANT CHANGE UP
GLUCOSE SERPL-MCNC: 108 MG/DL — HIGH (ref 70–99)
HCT VFR BLD CALC: 34.8 % — SIGNIFICANT CHANGE UP (ref 34.5–45)
HGB BLD-MCNC: 11.4 G/DL — LOW (ref 11.5–15.5)
MAGNESIUM SERPL-MCNC: 2 MG/DL — SIGNIFICANT CHANGE UP (ref 1.6–2.6)
MCHC RBC-ENTMCNC: 29.2 PG — SIGNIFICANT CHANGE UP (ref 27–34)
MCHC RBC-ENTMCNC: 32.8 GM/DL — SIGNIFICANT CHANGE UP (ref 32–36)
MCV RBC AUTO: 89.2 FL — SIGNIFICANT CHANGE UP (ref 80–100)
NRBC # BLD: 0 /100 WBCS — SIGNIFICANT CHANGE UP (ref 0–0)
PHOSPHATE SERPL-MCNC: 2.3 MG/DL — LOW (ref 2.5–4.5)
PLATELET # BLD AUTO: 125 K/UL — LOW (ref 150–400)
POTASSIUM SERPL-MCNC: 3.7 MMOL/L — SIGNIFICANT CHANGE UP (ref 3.5–5.3)
POTASSIUM SERPL-SCNC: 3.7 MMOL/L — SIGNIFICANT CHANGE UP (ref 3.5–5.3)
RBC # BLD: 3.9 M/UL — SIGNIFICANT CHANGE UP (ref 3.8–5.2)
RBC # FLD: 14.4 % — SIGNIFICANT CHANGE UP (ref 10.3–14.5)
SODIUM SERPL-SCNC: 141 MMOL/L — SIGNIFICANT CHANGE UP (ref 135–145)
WBC # BLD: 9.36 K/UL — SIGNIFICANT CHANGE UP (ref 3.8–10.5)
WBC # FLD AUTO: 9.36 K/UL — SIGNIFICANT CHANGE UP (ref 3.8–10.5)

## 2023-09-23 PROCEDURE — 99239 HOSP IP/OBS DSCHRG MGMT >30: CPT

## 2023-09-23 RX ORDER — SODIUM,POTASSIUM PHOSPHATES 278-250MG
1 POWDER IN PACKET (EA) ORAL ONCE
Refills: 0 | Status: COMPLETED | OUTPATIENT
Start: 2023-09-23 | End: 2023-09-23

## 2023-09-23 RX ADMIN — ENOXAPARIN SODIUM 40 MILLIGRAM(S): 100 INJECTION SUBCUTANEOUS at 05:04

## 2023-09-23 RX ADMIN — TAMOXIFEN CITRATE 20 MILLIGRAM(S): 20 TABLET, FILM COATED ORAL at 13:08

## 2023-09-23 RX ADMIN — Medication 1 TABLET(S): at 12:58

## 2023-09-23 RX ADMIN — Medication 112 MICROGRAM(S): at 05:04

## 2023-09-23 RX ADMIN — Medication 1 MILLIGRAM(S): at 12:59

## 2023-09-23 RX ADMIN — Medication 500 MILLIGRAM(S): at 16:32

## 2023-09-23 RX ADMIN — Medication 500 MILLIGRAM(S): at 12:59

## 2023-09-23 RX ADMIN — Medication 500 MILLIGRAM(S): at 05:04

## 2023-09-23 RX ADMIN — Medication 1 PACKET(S): at 13:08

## 2023-09-23 NOTE — PROGRESS NOTE ADULT - PROBLEM SELECTOR PLAN 5
S/p TBI with resultant aphasia.   - f/u PT eval S/p TBI with resultant aphasia.   - Pt to be d/celine w/ outpt PT, OT, Speech language pathology

## 2023-09-23 NOTE — SWALLOW BEDSIDE ASSESSMENT ADULT - SWALLOW EVAL: DIAGNOSIS
Pt is a 68yoF w/ pmhx of breast CA, aphasia s/p TBI, meningioma, hx of Grave's disease, p/w fingernail laceration and c/f recent food pocketing, found w/ acute UTI. Pt p/w oropharyngeal dysphagia likely acute-on-chronic given hx of TBI and now acute UTI. Oral phase; reduced oral grading, prolonged oral transport, reduced oral manipulation, min oral cavity residue w/ puree which improved w/ liquid wash. Pharyngeal Phase: w/ full tsp, pt w/ notable audible swallow, increasing delay of swallow, and delayed repeat swallow. With half tsp, swallow more timely although still mildly delayed, no audible swallow, and no overt s/sx of aspiration/penetration and no repeat swallow. Use of half tsp w/ alternating liquids/solids significantly improved swallow profile and tolerance. Encourage conservative w/ use of strategies as listed below. Pt is a 68yoF w/ pmhx of breast CA, aphasia s/p TBI, meningioma, hx of Grave's disease, p/w fingernail laceration and c/f recent food pocketing, found w/ acute UTI. Pt p/w oropharyngeal dysphagia likely acute-on-chronic given hx of TBI and now acute UTI. Oral phase; reduced oral grading, prolonged oral transport, reduced oral manipulation, min oral cavity residue w/ puree which was mostly cleared w/ liquid wash. Pharyngeal Phase: w/ full tsp, pt w/ notable audible swallow, increasing delay of swallow, and delayed repeat swallow. With half tsp, swallow more timely although still mildly delayed, no audible swallow, and no overt s/sx of aspiration/penetration and no repeat swallow. Use of half tsp w/ alternating liquids/solids significantly improved swallow profile and tolerance. Encourage conservative w/ use of strategies as listed below.

## 2023-09-23 NOTE — SWALLOW BEDSIDE ASSESSMENT ADULT - NS SPL SWALLOW CLINIC TRIAL FT
exhaustive trials were offered; no intolerance w/ strategies   pt opening mouth to all presentations.
Although no overt s/sx of aspiration/penetration noted w/ full tsp, given increasingly delayed and audible swallow w/ repeat delayed swallow, suspect some component of incoordination and pharyngeal residue. Swallow profile significantly improved w/ alternating 1/2 teaspoons with exhaustive trials administered with no overt s/sx of aspiration, no repeat swallow, and less audible swallow.

## 2023-09-23 NOTE — PROGRESS NOTE ADULT - PROBLEM SELECTOR PLAN 3
Pt w/ evidence of sinusitis on CTH  - Start augmentin 500/125 q8h x5 days Pt w/ evidence of sinusitis on CTH  - C/w augmentin 500/125 q8h x5 days

## 2023-09-23 NOTE — SWALLOW BEDSIDE ASSESSMENT ADULT - COMMENTS
Continued history:   -In the ED, she was given Tdap for the cut fingernails.   -Labs were significant for Na 157.   -Admitted to medicine for further workup.   -Acute UTI    Speech & Swallow ; no reports in SCM.  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    9/19 bedside swallow exam completed. Recommendations for puree and moderately thick liquids via half tsp w/ alternating liquids/solids. Noted audible w/ eventual cough w full tsp amounts.   9/22 new swallow order placed stating pt not swallowing this AM. Continued history:   -In the ED, she was given Tdap for the cut fingernails.   -Labs were significant for Na 157.   -Admitted to medicine for further workup.   -Acute UTI    Speech & Swallow ; no reports in SCM.  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    9/19 bedside swallow exam completed. Recommendations for puree and moderately thick liquids via half tsp w/ alternating liquids/solids. Noted audible w/ eventual cough w full tsp amounts.   9/22 new swallow order placed stating pt not swallowing this AM.  9/23 pt tolerated meds this morning and is swallowing breakfast per BREANNA Perdomo

## 2023-09-23 NOTE — PROGRESS NOTE ADULT - PROBLEM SELECTOR PLAN 4
Pt w/ decreased PO intake over past several weeks due to dysphagia. Per , eats regular food at baseline  - Purre diet w/ thick liquids per ss; difficulty swallowing 9/22 AM f/u repeat SS eval Pt w/ decreased PO intake over past several weeks due to dysphagia. Per , eats regular food at baseline  - Purre diet w/ thick liquids per ss

## 2023-09-23 NOTE — SWALLOW BEDSIDE ASSESSMENT ADULT - H & P REVIEW
JONATHAN CHILD is a 69yo/F w/ PMHx breast cancer s/p mastectomy now on tamoxifen, aphasia s/p TBI, meningioma, hypothyroidism (hx of Grave's now s/p radioactive iodine) who presented to hospital for fingernail laceration.  was cutting her nails in the dark when it was found that she was bleeding from her nailbeds. Per , pt has appeared more tired and had difficulty swallowing (and pocketing food) more in the last month. As a result, pt hasn't osmin much po intake in the last month. However, she hasn't made any complaints to him: no abd pain, no dysuria./yes
JONATHAN CHILD is a 67yo/F w/ PMHx breast cancer s/p mastectomy now on tamoxifen, aphasia s/p TBI, meningioma, hypothyroidism (hx of Grave's now s/p radioactive iodine) who presented to hospital for fingernail laceration.  was cutting her nails in the dark when it was found that she was bleeding from her nailbeds. Per , pt has appeared more tired and had difficulty swallowing (and pocketing food) more in the last month. As a result, pt hasn't osmin much po intake in the last month. However, she hasn't made any complaints to him: no abd pain, no dysuria./yes

## 2023-09-23 NOTE — SWALLOW BEDSIDE ASSESSMENT ADULT - SPECIFY REASON(S)
Order received/appreciated. Evaluation ordered to determine current swallow function and least restrictive diet.
to clinically assess swallow safety/function; r/o dysphagia.

## 2023-09-23 NOTE — SWALLOW BEDSIDE ASSESSMENT ADULT - SWALLOW EVAL: RECOMMENDED DIET
PUREE and MODERATELY THICK LIQUIDS via 1/2 tsp amounts, alternate liquid and solid
PUREE AND MODERATELY THICK LIQUIDS VIA HALF TEASPOON

## 2023-09-23 NOTE — PROGRESS NOTE ADULT - PROBLEM SELECTOR PROBLEM 8
Prophylactic measure
lab results/radiology results
Prophylactic measure

## 2023-09-23 NOTE — SWALLOW BEDSIDE ASSESSMENT ADULT - SLP GENERAL OBSERVATIONS
Pt received leaning on R side, eyes open and attending to bolus and clinician when talking, Aox0, nonverbal, absent ability to follow verbal directives. PCA actively feeding pt breakfast and reporting pt ate everything.

## 2023-09-23 NOTE — PROGRESS NOTE ADULT - SUBJECTIVE AND OBJECTIVE BOX
*******************************  Eleazar Miller MD (PGY-1)  Internal Medicine  Contact via [employer] TEAMS  *******************************    PROGRESS NOTE:     Patient is a 68y old  Female who presents with a chief complaint of hypernatremia (22 Sep 2023 11:36)      INTERVAL EVENTS: No acute overnight events.     SUBJECTIVE: Patient seen and examined at bedside. This morning, the patient is comfortable and doing well. No acute complaints. Denies fevers, chills, N/V/D, chest pain, SOB, abdominal pain.    MEDICATIONS  (STANDING):  amoxicillin   50 mG/mL/clavulanate Suspension 500 milliGRAM(s) Oral every 8 hours  ascorbic acid 500 milliGRAM(s) Oral daily  dextrose 5%. 500 milliLiter(s) (50 mL/Hr) IV Continuous <Continuous>  enoxaparin Injectable 40 milliGRAM(s) SubCutaneous every 24 hours  folic acid 1 milliGRAM(s) Oral daily  levothyroxine 112 MICROGram(s) Oral daily  multivitamin 1 Tablet(s) Oral daily  tamoxifen 20 milliGRAM(s) Oral daily    MEDICATIONS  (PRN):      CAPILLARY BLOOD GLUCOSE        I&O's Summary    21 Sep 2023 07:01  -  22 Sep 2023 07:00  --------------------------------------------------------  IN: 1520 mL / OUT: 1050 mL / NET: 470 mL    22 Sep 2023 07:01  -  23 Sep 2023 06:23  --------------------------------------------------------  IN: 180 mL / OUT: 0 mL / NET: 180 mL        PHYSICAL EXAM:  Vital Signs Last 24 Hrs  T(C): 37.1 (23 Sep 2023 04:15), Max: 37.1 (22 Sep 2023 20:09)  T(F): 98.7 (23 Sep 2023 04:15), Max: 98.8 (22 Sep 2023 20:09)  HR: 71 (23 Sep 2023 04:15) (68 - 75)  BP: 119/71 (23 Sep 2023 04:15) (95/64 - 119/71)  BP(mean): --  RR: 18 (23 Sep 2023 04:15) (17 - 18)  SpO2: 95% (23 Sep 2023 04:15) (92% - 95%)    Parameters below as of 23 Sep 2023 04:15  Patient On (Oxygen Delivery Method): room air        GENERAL: NAD, lying in bed comfortably  HEAD: Atraumatic, normocephalic  EYES: EOMI, PERRLA  ENT: Moist mucous membranes  NECK: Supple, no JVD  HEART: S1, S2, Regular rate and rhythm, no murmurs, rubs, or gallops  LUNGS: Unlabored respirations, clear to auscultation bilaterally, no crackles, wheezing, or rhonchi  ABDOMEN: Soft, nontender, nondistended, +BS  EXTREMITIES: No clubbing, cyanosis, or edema  NERVOUS SYSTEM:  A&Ox3, no focal deficits   SKIN: No rashes or lesions    LABS:                        11.4   10.79 )-----------( 109      ( 22 Sep 2023 13:58 )             35.0     09-22    139  |  107  |  5<L>  ----------------------------<  141<H>  3.9   |  21<L>  |  0.46<L>    Ca    8.1<L>      22 Sep 2023 07:29  Phos  2.0     09-22  Mg     2.0     09-22    TPro  5.7<L>  /  Alb  3.2<L>  /  TBili  0.9  /  DBili  x   /  AST  13  /  ALT  13  /  AlkPhos  84  09-22          Urinalysis Basic - ( 22 Sep 2023 07:29 )    Color: x / Appearance: x / SG: x / pH: x  Gluc: 141 mg/dL / Ketone: x  / Bili: x / Urobili: x   Blood: x / Protein: x / Nitrite: x   Leuk Esterase: x / RBC: x / WBC x   Sq Epi: x / Non Sq Epi: x / Bacteria: x          RADIOLOGY & ADDITIONAL TESTS:  Results Reviewed:   Imaging Personally Reviewed:  Electrocardiogram Personally Reviewed:  Tele: *******************************  Eleazar Miller MD (PGY-1)  Internal Medicine  Contact via [employer] TEAMS  *******************************    PROGRESS NOTE:     Patient is a 68y old  Female who presents with a chief complaint of hypernatremia (22 Sep 2023 11:36)      INTERVAL EVENTS: No acute overnight events.     SUBJECTIVE: Patient seen and examined at bedside. Unable to obtain ROS.    MEDICATIONS  (STANDING):  amoxicillin   50 mG/mL/clavulanate Suspension 500 milliGRAM(s) Oral every 8 hours  ascorbic acid 500 milliGRAM(s) Oral daily  dextrose 5%. 500 milliLiter(s) (50 mL/Hr) IV Continuous <Continuous>  enoxaparin Injectable 40 milliGRAM(s) SubCutaneous every 24 hours  folic acid 1 milliGRAM(s) Oral daily  levothyroxine 112 MICROGram(s) Oral daily  multivitamin 1 Tablet(s) Oral daily  tamoxifen 20 milliGRAM(s) Oral daily    MEDICATIONS  (PRN):      CAPILLARY BLOOD GLUCOSE        I&O's Summary    21 Sep 2023 07:01  -  22 Sep 2023 07:00  --------------------------------------------------------  IN: 1520 mL / OUT: 1050 mL / NET: 470 mL    22 Sep 2023 07:01  -  23 Sep 2023 06:23  --------------------------------------------------------  IN: 180 mL / OUT: 0 mL / NET: 180 mL        PHYSICAL EXAM:  Vital Signs Last 24 Hrs  T(C): 37.1 (23 Sep 2023 04:15), Max: 37.1 (22 Sep 2023 20:09)  T(F): 98.7 (23 Sep 2023 04:15), Max: 98.8 (22 Sep 2023 20:09)  HR: 71 (23 Sep 2023 04:15) (68 - 75)  BP: 119/71 (23 Sep 2023 04:15) (95/64 - 119/71)  BP(mean): --  RR: 18 (23 Sep 2023 04:15) (17 - 18)  SpO2: 95% (23 Sep 2023 04:15) (92% - 95%)    Parameters below as of 23 Sep 2023 04:15  Patient On (Oxygen Delivery Method): room air        GENERAL: NAD, frail appearing   HEAD: Atraumatic, normocephalic  EYES: EOMI, PERRLA  ENT: Moist mucous membranes  NECK: Supple, no JVD  HEART: S1, S2, Regular rate and rhythm, no murmurs, rubs, or gallops  LUNGS: Unlabored respirations, clear to auscultation bilaterally, no crackles, wheezing, or rhonchi  ABDOMEN: Soft, nontender, nondistended, +BS  EXTREMITIES: No clubbing, cyanosis, or edema  NERVOUS SYSTEM:  A&Ox0, no focal deficits   SKIN: No rashes or lesions    LABS:                        11.4   10.79 )-----------( 109      ( 22 Sep 2023 13:58 )             35.0     09-22    139  |  107  |  5<L>  ----------------------------<  141<H>  3.9   |  21<L>  |  0.46<L>    Ca    8.1<L>      22 Sep 2023 07:29  Phos  2.0     09-22  Mg     2.0     09-22    TPro  5.7<L>  /  Alb  3.2<L>  /  TBili  0.9  /  DBili  x   /  AST  13  /  ALT  13  /  AlkPhos  84  09-22          Urinalysis Basic - ( 22 Sep 2023 07:29 )    Color: x / Appearance: x / SG: x / pH: x  Gluc: 141 mg/dL / Ketone: x  / Bili: x / Urobili: x   Blood: x / Protein: x / Nitrite: x   Leuk Esterase: x / RBC: x / WBC x   Sq Epi: x / Non Sq Epi: x / Bacteria: x          RADIOLOGY & ADDITIONAL TESTS:  Results Reviewed:   Imaging Personally Reviewed:  Electrocardiogram Personally Reviewed:  Tele:

## 2023-09-23 NOTE — SWALLOW BEDSIDE ASSESSMENT ADULT - PHARYNGEAL PHASE
With full tsp amounts, pt with prolonged delay and significantly more audible swallow w/ delayed repeat swallow. With 1/2 tsp, swallow is more timely w/ no overt s/sx of aspiration/penetration./Delayed pharyngeal swallow
audible deglutition with full tsp amounts, suggestive of latency in the swallow response, eventual cough/dry response with full tsp amounts, improvement with 1/2 tsp amonts and alternating between a liquid and a solid/Delayed pharyngeal swallow/Throat clear post oral intake

## 2023-09-23 NOTE — PROGRESS NOTE ADULT - PROBLEM SELECTOR PLAN 2
Unclear of chronicity of UTI, but per , pt appeared more lethargic than baseline so will treat  - f/u Ucx  - start augmentin 500/125 as pt also found to have sinusitis Unclear of chronicity of UTI, but per , pt appeared more lethargic than baseline so will treat  - f/u Ucx  - C/w augmentin 500/125 as pt also found to have sinusitis

## 2023-09-23 NOTE — SWALLOW BEDSIDE ASSESSMENT ADULT - ADDITIONAL RECOMMENDATIONS
Pt will tolerate least restrictive diet prior to d/c from facility w/o overt s/sx of aspiration/penetration
GOAL- Pt to tolerate recommended textures during course w/ no s/sx of aspiration Pt/family/caregiver will demonstrate understanding and carryover of dysphagia management (safe swallow guidelines, dysphagia diet).

## 2023-09-23 NOTE — PROGRESS NOTE ADULT - ASSESSMENT
This is a 67yo/F w/ PMHx breast cancer s/p mastectomy now on tamoxifen, aphasia s/p TBI, meningioma, hypothyroidism (hx of Grave's now s/p radioactive iodine) who presented to hospital for fingernail laceration. Found to be hypernatremic to 157; now w/ Na 139 This is a 69yo/F w/ PMHx breast cancer s/p mastectomy now on tamoxifen, aphasia s/p TBI, meningioma, hypothyroidism (hx of Grave's now s/p radioactive iodine) who presented to hospital for fingernail laceration. Found to be hypernatremic to 157; now w/ Na 141

## 2023-09-23 NOTE — DISCHARGE NOTE NURSING/CASE MANAGEMENT/SOCIAL WORK - PATIENT PORTAL LINK FT
You can access the FollowMyHealth Patient Portal offered by [facility] by registering at the following website: [email or web address]. By joining [email or web address] FollowMyHealth portal, you will also be able to view your health information using other applications (apps) compatible with our system.

## 2023-09-23 NOTE — PROGRESS NOTE ADULT - PROBLEM SELECTOR PLAN 8
DVT ppx: lovenox  Diet: Puure  Dispo: f/u PT recs    Ethics: full code:  Silverio Arthur is the HCP (ophthalmologist)

## 2023-09-23 NOTE — PROGRESS NOTE ADULT - PROBLEM SELECTOR PLAN 1
Found to be hyperNa to 157 this admission. Likely in setting of poor PO intake in the last month prior to admission.   - s/p 1L LR in ED  - d/c D5 as Na wnl  - Na 139 (9/21)   - Trend BMPs; pts Na normalized previously but kalie to 149 again w/ d/c of D5 Found to be hyperNa to 157 this admission. Likely in setting of poor PO intake in the last month prior to admission.   - s/p 1L LR in ED  - d/c D5 as Na wnl  - Na 141 (9/21)   - Trend BMPs; pts Na normalized previously but kalie to 149 again w/ d/c of D5

## 2023-09-23 NOTE — SWALLOW BEDSIDE ASSESSMENT ADULT - ORAL PHASE
min oral cavity residue w/ puree, which mostly cleared w/ liquid wash/Decreased anterior-posterior movement of the bolus/Delayed oral transit time
no pocketing/Delayed oral transit time

## 2023-09-23 NOTE — DISCHARGE NOTE NURSING/CASE MANAGEMENT/SOCIAL WORK - NSDCVIVACCINE_GEN_ALL_CORE_FT
Tdap; 21-Apr-2020 23:10; Melani Altamirano (RN); [employer]; [id number] (Exp. Date: 17-Sep-2021); IntraMuscular; Deltoid Right.; 0.5 milliLiter(s); VIS (VIS Published: 09-May-2013, VIS Presented: 21-Apr-2020);   Tdap; 19-Apr-2021 16:09; Daly Torrez (RN); [employer]; [id number] (Exp. Date: 03-Sep-2021); IntraMuscular; Dorsogluteal Left.; 0.5 milliLiter(s); VIS (VIS Published: 09-May-2013, VIS Presented: 19-Apr-2021);   Tdap; 18-Sep-2023 21:15; Dasha Bowens (RN); [employer]; [id number] (Exp. Date: 06-Jun-2025); IntraMuscular; Deltoid Right.; 0.5 milliLiter(s); VIS (VIS Published: 09-May-2013, VIS Presented: 18-Sep-2023);

## 2023-09-23 NOTE — SWALLOW BEDSIDE ASSESSMENT ADULT - SLP PRECAUTIONS/LIMITATIONS: VISION
eye h/o left eye vitrectomy
eye h/o left eye vitrectomy
IDs reasons to live, future oriented and hopeful about his future responsibility to others, supportive friends and family, positive therapeutic relationships, future oriented, high spirituality ,CAMS assessment shows  low risk for suicide

## 2023-09-23 NOTE — DISCHARGE NOTE NURSING/CASE MANAGEMENT/SOCIAL WORK - NSDCPEFALRISK_GEN_ALL_CORE
For information on Fall & Injury Prevention, visit: [email or web address] OR  [email or web address] OR  https://www.cdc.gov/steadi/patient.html

## 2023-09-23 NOTE — PROGRESS NOTE ADULT - NUTRITIONAL ASSESSMENT
This patient has been assessed with a concern for Malnutrition and has been determined to have a diagnosis/diagnoses of Moderate protein-calorie malnutrition and Underweight (BMI < 19).    This patient is being managed with:   Diet Pureed-  Moderately Thick Liquids (MODTHICKLIQS)  Entered: Sep 19 2023  2:50PM  

## 2023-12-04 DIAGNOSIS — L89.90 PRESSURE ULCER OF UNSPECIFIED SITE, UNSPECIFIED STAGE: ICD-10-CM

## 2023-12-05 ENCOUNTER — NON-APPOINTMENT (OUTPATIENT)
Age: 68
End: 2023-12-05

## 2023-12-20 ENCOUNTER — INPATIENT (INPATIENT)
Facility: HOSPITAL | Age: 68
LOS: 28 days | Discharge: HOME | DRG: 622 | End: 2024-01-18
Attending: INTERNAL MEDICINE | Admitting: HOSPITALIST
Payer: MEDICARE

## 2023-12-20 VITALS
SYSTOLIC BLOOD PRESSURE: 125 MMHG | RESPIRATION RATE: 17 BRPM | OXYGEN SATURATION: 97 % | TEMPERATURE: 98 F | HEART RATE: 89 BPM | WEIGHT: 89.95 LBS | DIASTOLIC BLOOD PRESSURE: 80 MMHG | HEIGHT: 63 IN

## 2023-12-20 DIAGNOSIS — Z90.12 ACQUIRED ABSENCE OF LEFT BREAST AND NIPPLE: Chronic | ICD-10-CM

## 2023-12-20 DIAGNOSIS — E87.0 HYPEROSMOLALITY AND HYPERNATREMIA: ICD-10-CM

## 2023-12-20 LAB
ALBUMIN SERPL ELPH-MCNC: 3.4 G/DL — SIGNIFICANT CHANGE UP (ref 3.3–5)
ALBUMIN SERPL ELPH-MCNC: 3.4 G/DL — SIGNIFICANT CHANGE UP (ref 3.3–5)
ALP SERPL-CCNC: 98 U/L — SIGNIFICANT CHANGE UP (ref 40–120)
ALP SERPL-CCNC: 98 U/L — SIGNIFICANT CHANGE UP (ref 40–120)
ALT FLD-CCNC: 15 U/L — SIGNIFICANT CHANGE UP (ref 10–45)
ALT FLD-CCNC: 15 U/L — SIGNIFICANT CHANGE UP (ref 10–45)
ANION GAP SERPL CALC-SCNC: 14 MMOL/L — SIGNIFICANT CHANGE UP (ref 5–17)
ANION GAP SERPL CALC-SCNC: 14 MMOL/L — SIGNIFICANT CHANGE UP (ref 5–17)
ANION GAP SERPL CALC-SCNC: 20 MMOL/L — HIGH (ref 5–17)
ANION GAP SERPL CALC-SCNC: 20 MMOL/L — HIGH (ref 5–17)
APPEARANCE UR: ABNORMAL
APPEARANCE UR: ABNORMAL
APTT BLD: 30.7 SEC — SIGNIFICANT CHANGE UP (ref 24.5–35.6)
APTT BLD: 30.7 SEC — SIGNIFICANT CHANGE UP (ref 24.5–35.6)
AST SERPL-CCNC: 27 U/L — SIGNIFICANT CHANGE UP (ref 10–40)
AST SERPL-CCNC: 27 U/L — SIGNIFICANT CHANGE UP (ref 10–40)
BACTERIA # UR AUTO: ABNORMAL /HPF
BACTERIA # UR AUTO: ABNORMAL /HPF
BASE EXCESS BLDV CALC-SCNC: 0.2 MMOL/L — SIGNIFICANT CHANGE UP (ref -2–3)
BASE EXCESS BLDV CALC-SCNC: 0.2 MMOL/L — SIGNIFICANT CHANGE UP (ref -2–3)
BASOPHILS # BLD AUTO: 0.03 K/UL — SIGNIFICANT CHANGE UP (ref 0–0.2)
BASOPHILS # BLD AUTO: 0.03 K/UL — SIGNIFICANT CHANGE UP (ref 0–0.2)
BASOPHILS NFR BLD AUTO: 0.3 % — SIGNIFICANT CHANGE UP (ref 0–2)
BASOPHILS NFR BLD AUTO: 0.3 % — SIGNIFICANT CHANGE UP (ref 0–2)
BILIRUB SERPL-MCNC: 0.8 MG/DL — SIGNIFICANT CHANGE UP (ref 0.2–1.2)
BILIRUB SERPL-MCNC: 0.8 MG/DL — SIGNIFICANT CHANGE UP (ref 0.2–1.2)
BILIRUB UR-MCNC: ABNORMAL
BILIRUB UR-MCNC: ABNORMAL
BUN SERPL-MCNC: 26 MG/DL — HIGH (ref 7–23)
BUN SERPL-MCNC: 26 MG/DL — HIGH (ref 7–23)
BUN SERPL-MCNC: 28 MG/DL — HIGH (ref 7–23)
BUN SERPL-MCNC: 28 MG/DL — HIGH (ref 7–23)
CA-I SERPL-SCNC: 1.28 MMOL/L — SIGNIFICANT CHANGE UP (ref 1.15–1.33)
CA-I SERPL-SCNC: 1.28 MMOL/L — SIGNIFICANT CHANGE UP (ref 1.15–1.33)
CALCIUM SERPL-MCNC: 8.2 MG/DL — LOW (ref 8.4–10.5)
CALCIUM SERPL-MCNC: 8.2 MG/DL — LOW (ref 8.4–10.5)
CALCIUM SERPL-MCNC: 9 MG/DL — SIGNIFICANT CHANGE UP (ref 8.4–10.5)
CALCIUM SERPL-MCNC: 9 MG/DL — SIGNIFICANT CHANGE UP (ref 8.4–10.5)
CAST: 6 /LPF — HIGH (ref 0–4)
CAST: 6 /LPF — HIGH (ref 0–4)
CHLORIDE BLDV-SCNC: 121 MMOL/L — HIGH (ref 96–108)
CHLORIDE BLDV-SCNC: 121 MMOL/L — HIGH (ref 96–108)
CHLORIDE SERPL-SCNC: 122 MMOL/L — HIGH (ref 96–108)
CHLORIDE SERPL-SCNC: 122 MMOL/L — HIGH (ref 96–108)
CHLORIDE SERPL-SCNC: 131 MMOL/L — HIGH (ref 96–108)
CHLORIDE SERPL-SCNC: 131 MMOL/L — HIGH (ref 96–108)
CO2 BLDV-SCNC: 28 MMOL/L — HIGH (ref 22–26)
CO2 BLDV-SCNC: 28 MMOL/L — HIGH (ref 22–26)
CO2 SERPL-SCNC: 18 MMOL/L — LOW (ref 22–31)
CO2 SERPL-SCNC: 18 MMOL/L — LOW (ref 22–31)
CO2 SERPL-SCNC: 21 MMOL/L — LOW (ref 22–31)
CO2 SERPL-SCNC: 21 MMOL/L — LOW (ref 22–31)
COLOR SPEC: SIGNIFICANT CHANGE UP
COLOR SPEC: SIGNIFICANT CHANGE UP
CREAT SERPL-MCNC: 0.51 MG/DL — SIGNIFICANT CHANGE UP (ref 0.5–1.3)
CREAT SERPL-MCNC: 0.51 MG/DL — SIGNIFICANT CHANGE UP (ref 0.5–1.3)
CREAT SERPL-MCNC: 0.57 MG/DL — SIGNIFICANT CHANGE UP (ref 0.5–1.3)
CREAT SERPL-MCNC: 0.57 MG/DL — SIGNIFICANT CHANGE UP (ref 0.5–1.3)
DIFF PNL FLD: ABNORMAL
DIFF PNL FLD: ABNORMAL
EGFR: 100 ML/MIN/1.73M2 — SIGNIFICANT CHANGE UP
EGFR: 100 ML/MIN/1.73M2 — SIGNIFICANT CHANGE UP
EGFR: 98 ML/MIN/1.73M2 — SIGNIFICANT CHANGE UP
EGFR: 98 ML/MIN/1.73M2 — SIGNIFICANT CHANGE UP
EOSINOPHIL # BLD AUTO: 0.02 K/UL — SIGNIFICANT CHANGE UP (ref 0–0.5)
EOSINOPHIL # BLD AUTO: 0.02 K/UL — SIGNIFICANT CHANGE UP (ref 0–0.5)
EOSINOPHIL NFR BLD AUTO: 0.2 % — SIGNIFICANT CHANGE UP (ref 0–6)
EOSINOPHIL NFR BLD AUTO: 0.2 % — SIGNIFICANT CHANGE UP (ref 0–6)
FLUAV AG NPH QL: SIGNIFICANT CHANGE UP
FLUAV AG NPH QL: SIGNIFICANT CHANGE UP
FLUBV AG NPH QL: SIGNIFICANT CHANGE UP
FLUBV AG NPH QL: SIGNIFICANT CHANGE UP
GAS PNL BLDV: 155 MMOL/L — HIGH (ref 136–145)
GAS PNL BLDV: 155 MMOL/L — HIGH (ref 136–145)
GAS PNL BLDV: SIGNIFICANT CHANGE UP
GLUCOSE BLDV-MCNC: 111 MG/DL — HIGH (ref 70–99)
GLUCOSE BLDV-MCNC: 111 MG/DL — HIGH (ref 70–99)
GLUCOSE SERPL-MCNC: 102 MG/DL — HIGH (ref 70–99)
GLUCOSE SERPL-MCNC: 102 MG/DL — HIGH (ref 70–99)
GLUCOSE SERPL-MCNC: 108 MG/DL — HIGH (ref 70–99)
GLUCOSE SERPL-MCNC: 108 MG/DL — HIGH (ref 70–99)
GLUCOSE UR QL: NEGATIVE MG/DL — SIGNIFICANT CHANGE UP
GLUCOSE UR QL: NEGATIVE MG/DL — SIGNIFICANT CHANGE UP
HCO3 BLDV-SCNC: 26 MMOL/L — SIGNIFICANT CHANGE UP (ref 22–29)
HCO3 BLDV-SCNC: 26 MMOL/L — SIGNIFICANT CHANGE UP (ref 22–29)
HCT VFR BLD CALC: 55.7 % — HIGH (ref 34.5–45)
HCT VFR BLD CALC: 55.7 % — HIGH (ref 34.5–45)
HCT VFR BLDA CALC: 44 % — SIGNIFICANT CHANGE UP (ref 34.5–46.5)
HCT VFR BLDA CALC: 44 % — SIGNIFICANT CHANGE UP (ref 34.5–46.5)
HGB BLD CALC-MCNC: 14.6 G/DL — SIGNIFICANT CHANGE UP (ref 11.7–16.1)
HGB BLD CALC-MCNC: 14.6 G/DL — SIGNIFICANT CHANGE UP (ref 11.7–16.1)
HGB BLD-MCNC: 15.7 G/DL — HIGH (ref 11.5–15.5)
HGB BLD-MCNC: 15.7 G/DL — HIGH (ref 11.5–15.5)
IMM GRANULOCYTES NFR BLD AUTO: 0.4 % — SIGNIFICANT CHANGE UP (ref 0–0.9)
IMM GRANULOCYTES NFR BLD AUTO: 0.4 % — SIGNIFICANT CHANGE UP (ref 0–0.9)
INR BLD: 1.15 RATIO — SIGNIFICANT CHANGE UP (ref 0.85–1.18)
INR BLD: 1.15 RATIO — SIGNIFICANT CHANGE UP (ref 0.85–1.18)
KETONES UR-MCNC: 15 MG/DL
KETONES UR-MCNC: 15 MG/DL
LACTATE BLDV-MCNC: 2.7 MMOL/L — HIGH (ref 0.5–2)
LACTATE BLDV-MCNC: 2.7 MMOL/L — HIGH (ref 0.5–2)
LACTATE SERPL-SCNC: 1.3 MMOL/L — SIGNIFICANT CHANGE UP (ref 0.5–2)
LACTATE SERPL-SCNC: 1.3 MMOL/L — SIGNIFICANT CHANGE UP (ref 0.5–2)
LEUKOCYTE ESTERASE UR-ACNC: NEGATIVE — SIGNIFICANT CHANGE UP
LEUKOCYTE ESTERASE UR-ACNC: NEGATIVE — SIGNIFICANT CHANGE UP
LYMPHOCYTES # BLD AUTO: 1.44 K/UL — SIGNIFICANT CHANGE UP (ref 1–3.3)
LYMPHOCYTES # BLD AUTO: 1.44 K/UL — SIGNIFICANT CHANGE UP (ref 1–3.3)
LYMPHOCYTES # BLD AUTO: 13.2 % — SIGNIFICANT CHANGE UP (ref 13–44)
LYMPHOCYTES # BLD AUTO: 13.2 % — SIGNIFICANT CHANGE UP (ref 13–44)
MCHC RBC-ENTMCNC: 26.3 PG — LOW (ref 27–34)
MCHC RBC-ENTMCNC: 26.3 PG — LOW (ref 27–34)
MCHC RBC-ENTMCNC: 28.2 GM/DL — LOW (ref 32–36)
MCHC RBC-ENTMCNC: 28.2 GM/DL — LOW (ref 32–36)
MCV RBC AUTO: 93.3 FL — SIGNIFICANT CHANGE UP (ref 80–100)
MCV RBC AUTO: 93.3 FL — SIGNIFICANT CHANGE UP (ref 80–100)
MONOCYTES # BLD AUTO: 0.34 K/UL — SIGNIFICANT CHANGE UP (ref 0–0.9)
MONOCYTES # BLD AUTO: 0.34 K/UL — SIGNIFICANT CHANGE UP (ref 0–0.9)
MONOCYTES NFR BLD AUTO: 3.1 % — SIGNIFICANT CHANGE UP (ref 2–14)
MONOCYTES NFR BLD AUTO: 3.1 % — SIGNIFICANT CHANGE UP (ref 2–14)
NEUTROPHILS # BLD AUTO: 9 K/UL — HIGH (ref 1.8–7.4)
NEUTROPHILS # BLD AUTO: 9 K/UL — HIGH (ref 1.8–7.4)
NEUTROPHILS NFR BLD AUTO: 82.8 % — HIGH (ref 43–77)
NEUTROPHILS NFR BLD AUTO: 82.8 % — HIGH (ref 43–77)
NITRITE UR-MCNC: POSITIVE
NITRITE UR-MCNC: POSITIVE
NRBC # BLD: 0 /100 WBCS — SIGNIFICANT CHANGE UP (ref 0–0)
NRBC # BLD: 0 /100 WBCS — SIGNIFICANT CHANGE UP (ref 0–0)
PCO2 BLDV: 48 MMHG — HIGH (ref 39–42)
PCO2 BLDV: 48 MMHG — HIGH (ref 39–42)
PH BLDV: 7.35 — SIGNIFICANT CHANGE UP (ref 7.32–7.43)
PH BLDV: 7.35 — SIGNIFICANT CHANGE UP (ref 7.32–7.43)
PH UR: 5 — SIGNIFICANT CHANGE UP (ref 5–8)
PH UR: 5 — SIGNIFICANT CHANGE UP (ref 5–8)
PLATELET # BLD AUTO: 154 K/UL — SIGNIFICANT CHANGE UP (ref 150–400)
PLATELET # BLD AUTO: 154 K/UL — SIGNIFICANT CHANGE UP (ref 150–400)
PO2 BLDV: 33 MMHG — SIGNIFICANT CHANGE UP (ref 25–45)
PO2 BLDV: 33 MMHG — SIGNIFICANT CHANGE UP (ref 25–45)
POTASSIUM BLDV-SCNC: 4.6 MMOL/L — SIGNIFICANT CHANGE UP (ref 3.5–5.1)
POTASSIUM BLDV-SCNC: 4.6 MMOL/L — SIGNIFICANT CHANGE UP (ref 3.5–5.1)
POTASSIUM SERPL-MCNC: 3.6 MMOL/L — SIGNIFICANT CHANGE UP (ref 3.5–5.3)
POTASSIUM SERPL-MCNC: 3.6 MMOL/L — SIGNIFICANT CHANGE UP (ref 3.5–5.3)
POTASSIUM SERPL-MCNC: 4.8 MMOL/L — SIGNIFICANT CHANGE UP (ref 3.5–5.3)
POTASSIUM SERPL-MCNC: 4.8 MMOL/L — SIGNIFICANT CHANGE UP (ref 3.5–5.3)
POTASSIUM SERPL-SCNC: 3.6 MMOL/L — SIGNIFICANT CHANGE UP (ref 3.5–5.3)
POTASSIUM SERPL-SCNC: 3.6 MMOL/L — SIGNIFICANT CHANGE UP (ref 3.5–5.3)
POTASSIUM SERPL-SCNC: 4.8 MMOL/L — SIGNIFICANT CHANGE UP (ref 3.5–5.3)
POTASSIUM SERPL-SCNC: 4.8 MMOL/L — SIGNIFICANT CHANGE UP (ref 3.5–5.3)
PROCALCITONIN SERPL-MCNC: 0.12 NG/ML — HIGH (ref 0.02–0.1)
PROCALCITONIN SERPL-MCNC: 0.12 NG/ML — HIGH (ref 0.02–0.1)
PROT SERPL-MCNC: 7.6 G/DL — SIGNIFICANT CHANGE UP (ref 6–8.3)
PROT SERPL-MCNC: 7.6 G/DL — SIGNIFICANT CHANGE UP (ref 6–8.3)
PROT UR-MCNC: SIGNIFICANT CHANGE UP MG/DL
PROT UR-MCNC: SIGNIFICANT CHANGE UP MG/DL
PROTHROM AB SERPL-ACNC: 12 SEC — SIGNIFICANT CHANGE UP (ref 9.5–13)
PROTHROM AB SERPL-ACNC: 12 SEC — SIGNIFICANT CHANGE UP (ref 9.5–13)
RBC # BLD: 5.97 M/UL — HIGH (ref 3.8–5.2)
RBC # BLD: 5.97 M/UL — HIGH (ref 3.8–5.2)
RBC # FLD: 14.9 % — HIGH (ref 10.3–14.5)
RBC # FLD: 14.9 % — HIGH (ref 10.3–14.5)
RBC CASTS # UR COMP ASSIST: 1 /HPF — SIGNIFICANT CHANGE UP (ref 0–4)
RBC CASTS # UR COMP ASSIST: 1 /HPF — SIGNIFICANT CHANGE UP (ref 0–4)
REVIEW: SIGNIFICANT CHANGE UP
REVIEW: SIGNIFICANT CHANGE UP
RSV RNA NPH QL NAA+NON-PROBE: SIGNIFICANT CHANGE UP
RSV RNA NPH QL NAA+NON-PROBE: SIGNIFICANT CHANGE UP
SAO2 % BLDV: 45.1 % — LOW (ref 67–88)
SAO2 % BLDV: 45.1 % — LOW (ref 67–88)
SARS-COV-2 RNA SPEC QL NAA+PROBE: SIGNIFICANT CHANGE UP
SARS-COV-2 RNA SPEC QL NAA+PROBE: SIGNIFICANT CHANGE UP
SODIUM SERPL-SCNC: 160 MMOL/L — CRITICAL HIGH (ref 135–145)
SODIUM SERPL-SCNC: 160 MMOL/L — CRITICAL HIGH (ref 135–145)
SODIUM SERPL-SCNC: 166 MMOL/L — CRITICAL HIGH (ref 135–145)
SODIUM SERPL-SCNC: 166 MMOL/L — CRITICAL HIGH (ref 135–145)
SP GR SPEC: 1.03 — SIGNIFICANT CHANGE UP (ref 1–1.03)
SP GR SPEC: 1.03 — SIGNIFICANT CHANGE UP (ref 1–1.03)
SQUAMOUS # UR AUTO: 3 /HPF — SIGNIFICANT CHANGE UP (ref 0–5)
SQUAMOUS # UR AUTO: 3 /HPF — SIGNIFICANT CHANGE UP (ref 0–5)
UROBILINOGEN FLD QL: 1 MG/DL — SIGNIFICANT CHANGE UP (ref 0.2–1)
UROBILINOGEN FLD QL: 1 MG/DL — SIGNIFICANT CHANGE UP (ref 0.2–1)
WBC # BLD: 10.87 K/UL — HIGH (ref 3.8–10.5)
WBC # BLD: 10.87 K/UL — HIGH (ref 3.8–10.5)
WBC # FLD AUTO: 10.87 K/UL — HIGH (ref 3.8–10.5)
WBC # FLD AUTO: 10.87 K/UL — HIGH (ref 3.8–10.5)
WBC UR QL: 7 /HPF — HIGH (ref 0–5)
WBC UR QL: 7 /HPF — HIGH (ref 0–5)

## 2023-12-20 PROCEDURE — 99285 EMERGENCY DEPT VISIT HI MDM: CPT

## 2023-12-20 PROCEDURE — 70450 CT HEAD/BRAIN W/O DYE: CPT | Mod: 26,MA

## 2023-12-20 PROCEDURE — 99223 1ST HOSP IP/OBS HIGH 75: CPT | Mod: GC

## 2023-12-20 PROCEDURE — 71045 X-RAY EXAM CHEST 1 VIEW: CPT | Mod: 26

## 2023-12-20 PROCEDURE — 93010 ELECTROCARDIOGRAM REPORT: CPT

## 2023-12-20 RX ORDER — SODIUM CHLORIDE 9 MG/ML
1000 INJECTION INTRAMUSCULAR; INTRAVENOUS; SUBCUTANEOUS ONCE
Refills: 0 | Status: COMPLETED | OUTPATIENT
Start: 2023-12-20 | End: 2023-12-20

## 2023-12-20 RX ORDER — CEFTRIAXONE 500 MG/1
1000 INJECTION, POWDER, FOR SOLUTION INTRAMUSCULAR; INTRAVENOUS ONCE
Refills: 0 | Status: COMPLETED | OUTPATIENT
Start: 2023-12-20 | End: 2023-12-20

## 2023-12-20 RX ADMIN — CEFTRIAXONE 100 MILLIGRAM(S): 500 INJECTION, POWDER, FOR SOLUTION INTRAMUSCULAR; INTRAVENOUS at 13:04

## 2023-12-20 RX ADMIN — SODIUM CHLORIDE 1000 MILLILITER(S): 9 INJECTION INTRAMUSCULAR; INTRAVENOUS; SUBCUTANEOUS at 11:52

## 2023-12-20 NOTE — H&P ADULT - PROBLEM SELECTOR PLAN 4
Hx of recurrent UTI (last admission 9/2023)  - S/p IV CTX 1 mg x1 in ED  - Afebrile, hypotensive but HDS  - ***continue CTX for empiric coverage   - F/u urine cultures Hx of recurrent UTI (last admission 9/2023)  - Positive UA, s/p IV CTX 1 mg x1 in ED  - Afebrile, hypotensive but HDS  - Continue IV CTX 1 mg QD for empiric UTI treatment, pending urine cultures to narrow abx spectrum

## 2023-12-20 NOTE — ED PROVIDER NOTE - CLINICAL SUMMARY MEDICAL DECISION MAKING FREE TEXT BOX
Medical decision making:   HPI concerning for recurrent dehydration +/-  hypernatremia  Patient does not meet sepsis criteria at this time, but aggressive workup with labs, chest x-ray, urinalysis, CT head is indicated at this time, given her medical history.   Anticipate admission to the hospital.

## 2023-12-20 NOTE — H&P ADULT - PROBLEM SELECTOR PLAN 2
Symptoms of lethargy/encephalopathy >48h with no PO intake, likely has chronic hypernatremia (vs acute)  - Free water deficit 3.8L  - Target lowering the serum Na by 10 mEq/L in 24h  - IV D5W at 1.35 ml/kg/h (55 ml/h)  - Trend serum Na q6h; once the target  rate of correction attained, monitor q12h to 24h until normonatremia attained  - Consider urine osm and ADH study to assess etiology for hypernatremia Symptoms of lethargy/encephalopathy >48h with no PO intake, likely has chronic hypernatremia (vs acute) 2/2 severe dehydration  - Free water deficit 3.8L  - Target lowering the serum Na by 10 mEq/L in 24h  - Start IV D5W at 1.35 ml/kg/h (start at 60 ml/h)  - Trend serum Na q6h; once the target  rate of correction attained, liberalize to [id number] until normonatremia attained Symptoms of lethargy/encephalopathy >48h with no PO intake, likely has chronic hypernatremia (vs acute) 2/2 severe dehydration  - Free water deficit 3.8L  - Target lowering the serum Na by 10 mEq/L in 24h  - Start IV D5W at 1.35 ml/kg/h (start at 60 ml/h)  - Trend serum Na q6h; once the target  rate of correction attained, liberalize to [id number] until normonatremia attained

## 2023-12-20 NOTE — H&P ADULT - PROBLEM SELECTOR PLAN 7
Hx of Grave's disease s/p radioactive iodine  - Continue home levothyroxine Hx of Grave's disease s/p radioactive iodine  - Continue home levothyroxine in IV ( mcg --> IV 56 mcg QD)  - F/u TSH with AM labs

## 2023-12-20 NOTE — H&P ADULT - ATTENDING COMMENTS
I have personally reviewed the labs, imaging and ekg. CXR w/o focal consolidations. EKG with NSR HR 78 QTc 471 non-specific ST segment findings.    68F w/ PMHx significant for breast cancer s/p L mastectomy, TBI (s/p a fall ~2 years ago) c/b aphasia, meningioma, hypothyroidism (hx Grave's dz s/p radioactive iodine), CVA, and dysphagia who p/w decreasing PO and acute encephalopathy over past 2-3 days found to have severe hypernatremia and possible UTI. Now non-verbal, case was discussed with son as / HCP was unable to be reached. Full Code until  can be reached for a detailed GOC discussion.     -NPO for now  -Cont. D5W @60ccs/hr overnight, trend bmp Q6hrs until improvement rate established.  -Target lowering the serum Na by 10-12 mEq/L in 24h  -Speech/ Swallow consult  -Wound care consult for decubs  -Need detailed GOC/ MOLST discussion with HCP  -Cont. IV Ceftriaxone for UTI, F/u UCx  -IV levothyroxine for now, F/u TSH  -DVT PPx, Lovenox

## 2023-12-20 NOTE — ED PROVIDER NOTE - OBJECTIVE STATEMENT
68 female past medical history TBI 2 years ago complicated by aphasia bed bound history: CVA in 2010 reviewed, breast cancer on tamoxifen, hypothyroidism presenting with decreased p.o. intake and AMS over the past 2 to 3 days.  History obtained by  who was at bedside who states patient has been having difficulty swallowing her food over the past week or so has been worsening over the past few days.  Patient has been more lethargic so  called EMS to bring her here today. He denies pt c/o pain, no known fevers or chills, no shortness of breath or difficulty breathing, no cough, no recent trauma/falls. not on ac.

## 2023-12-20 NOTE — H&P ADULT - HISTORY OF PRESENT ILLNESS
68F with PMH significant for breast cancer s/p L mastectomy, TBI (s/p a fall ~2 years ago) c/b aphasia, meningioma, hypothyroidism (hx Grave's dz s/p radioactive iodine), CVA, and dysphagia who presents to [facility] ED on 12/20/2023 with no PO intake and increased lethargy in the last 2-3 days.    On encounter, pt is unable to communicate verbally, only opens eyes to voice. Unable to maintain attention or follow commands at this time. Tried contacting  (HCP), unable to reach at this time; called the son (Henok) to obtain collateral HPI. Per son, pt was verbal up to 2-3 days ago with prompting, usually able to answer Y/N questions or say her name. Pt was also able to tolerate thick liquids such as sauce. Approx 2-3 days ago, pt started not tolerating any PO intake, became significantly more lethargic at home, and was brought to the ED. Pt lives at home with , and is able to walk with a walker and a personal assistance. Reported to have relative left-sided weakness since the fall that led to TBI. Unable to assess ROS currently d/t encephalopathy/lethargy.    Vital signs stable with mild hypotension (T 36.6C, HR 78, BP 90/60, on RA, SpO2 >96%). Initial workup in the ED notable for borderline leukocytosis 10.87 with PMN predominance, significant hypernatremia 160 -> 166 (checking q6h), elevated procal 0.12, and positive UA (dark yellow, cloudy, +ketone, nitrite, 7 WBC, many bacteria). CXR with enlarged cardiomediastinal silhouette and L base atelectasis; no focal consolidations, pleural effusion, or pneumothorax. Lactate 1.3, negative RVP, and CTH negative for acute intracranial pathology (redemonstration of parenchymal volume loss + chronic microvascular changes).    Pt was given empiric IV CTX 1 g x1 and NS bolus 1L in the ED. Blood and urine cultures have been sent. 68F with PMH significant for breast cancer s/p L mastectomy, TBI (s/p a fall ~2 years ago) c/b aphasia, meningioma, hypothyroidism (hx Grave's dz s/p radioactive iodine), CVA, and dysphagia who presents to [facility] ED on 12/20/2023 with no PO intake and increased lethargy in the last 2-3 days.    On encounter, pt is unable to communicate verbally, only opens eyes to voice. Unable to maintain attention or follow commands at this time. Tried contacting  (HCP), unable to reach at this time; called the son (Henok) to obtain collateral HPI. Per son, pt was verbal up to 2-3 days ago with prompting, usually able to answer Y/N questions or say her name. Pt was also able to tolerate thick liquids such as sauce. Approx 2-3 days ago, pt started not tolerating any PO intake, became significantly more lethargic at home, and was brought to the ED. Pt lives at home with , and is able to walk with a walker and a personal assistance. Reported to have relative left-sided weakness since the fall that led to TBI. Unable to assess ROS currently d/t encephalopathy/lethargy.    Vital signs stable with mild hypotension (T 36.6C, HR 78, BP 90/60, on RA, SpO2 >96%). Initial workup in the ED notable for borderline leukocytosis 10.87 with PMN predominance, significant hypernatremia 160 -> 166 (checking q6h), elevated procal 0.12, and positive UA (dark yellow, cloudy, +ketone, nitrite, 7 WBC, many bacteria). CXR with enlarged cardiomediastinal silhouette and L base atelectasis; no focal consolidations, pleural effusion, or pneumothorax. Lactate 1.3, negative RVP, and CTH negative for acute intracranial pathology (redemonstration of parenchymal volume loss + chronic microvascular changes).    Pt was given empiric IV CTX 1 g x1 and NS bolus 1L in the ED. Blood and urine cultures have been sent.

## 2023-12-20 NOTE — H&P ADULT - PROBLEM SELECTOR PLAN 8
- DVT ppx: Lovenox and SCDs  - Diet: NPO until SLP eval  - PT  - Dispo: pending clinical course - DVT ppx: Lovenox (40 mg QD; CrCl >30 ml/min) and SCDs  - Diet: NPO until SLP eval  - PT  - Dispo: pending clinical course

## 2023-12-20 NOTE — H&P ADULT - PROBLEM SELECTOR PLAN 1
P/w progressively worsening lethargy/encephalopathy i/s/o no PO intake for 2-3 days; at baseline, answers simple questions (Y/N, name) per son  - Most likely ddx include metabolic (hypernatremia) vs infection (c/f UTI) vs mixed  - Currently AOx0, non-verbal, opens eyes to voice, unable to maintain attention or follow commands  - Manage chronic hypernatremia (see the specific section of the plan)  - S/p IV CTX 1 mg x1 in ED  - ***continue CTX for empiric coverage   - F/u urine cultures P/w progressively worsening lethargy/encephalopathy i/s/o no PO intake for 2-3 days; at baseline, answers simple questions (Y/N, name) per son  - Most likely ddx include metabolic (hypernatremia) vs infection (c/f UTI) vs mixed  - Currently AOx0, non-verbal, opens eyes to voice, unable to maintain attention or follow commands  - Manage chronic hypernatremia (see the specific section of the plan)  - Positive UA, s/p IV CTX 1 mg x1 in ED  - Continue IV CTX 1 mg QD for empiric UTI treatment, pending urine cultures

## 2023-12-20 NOTE — H&P ADULT - NSHPLABSRESULTS_GEN_ALL_CORE
LABS:                        15.7   10.87 )-----------( 154      ( 20 Dec 2023 11:54 )             55.7     12-20    166<HH>  |  131<H>  |  26<H>  ----------------------------<  102<H>  3.6   |  21<L>  |  0.51    Ca    8.2<L>      20 Dec 2023 19:28    TPro  7.6  /  Alb  3.4  /  TBili  0.8  /  DBili  x   /  AST  27  /  ALT  15  /  AlkPhos  98  12-20      PT/INR - ( 20 Dec 2023 11:54 )   PT: 12.0 sec;   INR: 1.15 ratio         PTT - ( 20 Dec 2023 11:54 )  PTT:30.7 sec  CAPILLARY BLOOD GLUCOSE                  Urinalysis Basic - ( 20 Dec 2023 19:28 )    Color: x / Appearance: x / SG: x / pH: x  Gluc: 102 mg/dL / Ketone: x  / Bili: x / Urobili: x   Blood: x / Protein: x / Nitrite: x   Leuk Esterase: x / RBC: x / WBC x   Sq Epi: x / Non Sq Epi: x / Bacteria: x            I & O Summary:      Microbiology:

## 2023-12-20 NOTE — ED ADULT NURSE REASSESSMENT NOTE - NS ED NURSE REASSESS COMMENT FT1
Patient straight cathed for urine using sterile technique. Second RN present to confirm sterility. Explained procedure as it was being done - Pt tolerated procedure well. Sterile specimens collected and sent to lab as ordered. drained aprox 100ml of dark urine. Comfort and safety provided.

## 2023-12-20 NOTE — ED PROVIDER NOTE - PHYSICAL EXAMINATION
A&Ox0,   MM dry  NCAT.   Neck supple, no LAD.   Lungs CTAB. No w/r/r  Cardiac  RRR  Abd soft, NT/ND  Extremities: cap refill <2, pulses in distal extremities 4+, no edema.   Skin without rash.   + contractures of BL upper and lower extremities (baseline s/p TBI)

## 2023-12-20 NOTE — PATIENT PROFILE ADULT - FALL HARM RISK - HARM RISK INTERVENTIONS
Assistance with ambulation/Assistance OOB with selected safe patient handling equipment/Communicate Risk of Fall with Harm to all staff/Discuss with provider need for PT consult/Monitor gait and stability/Provide patient with walking aids - walker, cane, crutches/Reinforce activity limits and safety measures with patient and family/Tailored Fall Risk Interventions/Toileting schedule using arm’s reach rule for commode and bathroom/Visual Cue: Yellow wristband and red socks/Bed in lowest position, wheels locked, appropriate side rails in place/Call bell, personal items and telephone in reach/Instruct patient to call for assistance before getting out of bed or chair/Non-slip footwear when patient is out of bed/[place] to call system/Physically safe environment - no spills, clutter or unnecessary equipment/Purposeful Proactive Rounding/Room/bathroom lighting operational, light cord in reach Assistance with ambulation/Assistance OOB with selected safe patient handling equipment/Communicate Risk of Fall with Harm to all staff/Discuss with provider need for PT consult/Monitor gait and stability/Provide patient with walking aids - walker, cane, crutches/Reinforce activity limits and safety measures with patient and family/Tailored Fall Risk Interventions/Toileting schedule using arm’s reach rule for commode and bathroom/Visual Cue: Yellow wristband and red socks/Bed in lowest position, wheels locked, appropriate side rails in place/Call bell, personal items and telephone in reach/Instruct patient to call for assistance before getting out of bed or chair/Non-slip footwear when patient is out of bed/[place] to call system/Physically safe environment - no spills, clutter or unnecessary equipment/Purposeful Proactive Rounding/Room/bathroom lighting operational, light cord in reach

## 2023-12-20 NOTE — ED PROVIDER NOTE - ATTENDING APP SHARED VISIT CONTRIBUTION OF CARE
MD Ac:  patient seen and evaluated with the PA.  I was present for key portions of the History and Physical, and I agree with the Impression and Plan.      Patient is a 68-year-old female brought in by EMS at the behest of  for decreased mental status, decreased p.o. intake.  Patient has exhibited decreased p.o. intake for the last 2 weeks, decreased level of consciousness for the last 2 days.  Patient's medical history significant for traumatic subarachnoid hemorrhage, with persistent aphasia, bedbound, contracted.      Context: Similar HPI in September during which time she was found to be hypernatremic, sodium 157    , Dr. Silverio Arthur is at bedside, he is a practicing ophthalmologist.    Vital signs: Afebrile rectally, within normal limits  General: Elderly female, chronically debilitated appearing, aphasic  HEENT: Mucous membranes dry  Neck: No JVD   chest: Left-sided breast implant appreciated, lungs clear to auscultation bilaterally  Cardiac: Regular rate and rhythm  Abdomen: Nondistended  Extremities: Contracted, no deformities  Neurologic: Aphasic, eyes open, pupils equal round reactive to light   psychiatric: Not applicable    Medical decision making:   HPI concerning for recurrent dehydration +/-  hypernatremia  Patient does not meet sepsis criteria at this time, but aggressive workup with labs, chest x-ray, urinalysis, CT head is indicated at this time, given her medical history.    Anticipate admission to the hospital. MD Ac:  patient seen and evaluated with the PA.  I was present for key portions of the History and Physical, and I agree with the Impression and Plan.      Patient is a 68-year-old female brought in by EMS at the behest of  for decreased mental status, decreased p.o. intake.  Patient has exhibited decreased p.o. intake for the last 2 weeks, decreased level of consciousness for the last 2 days.  Patient's medical history significant for traumatic subarachnoid hemorrhage, with persistent aphasia, bedbound, contracted.      Context: Similar HPI in September during which time she was found to be hypernatremic, sodium 157    , Dr. Silverio Arthur is at bedside, he is a practicing ophthalmologist.    Vital signs: Afebrile rectally, within normal limits  General: Elderly female, chronically debilitated appearing, aphasic  HEENT: Mucous membranes dry  Neck: No JVD   chest: Left-sided breast implant appreciated, lungs clear to auscultation bilaterally  Cardiac: Regular rate and rhythm  Abdomen: Nondistended  Extremities: Contracted, no deformities  Neurologic: Aphasic, eyes open, pupils equal round reactive to light   psychiatric: Not applicable    Medical decision making:   HPI concerning for recurrent dehydration +/-  hypernatremia  Patient does not meet sepsis criteria at this time, but aggressive workup with labs, chest x-ray, urinalysis, CT head is indicated at this time, given her medical history.    Anticipate admission to the hospital.    ECG directly visualized by me and shows normal sinus rhythm, rate 78, , QRS 68, QTc 471 no STEMI

## 2023-12-20 NOTE — H&P ADULT - PROBLEM SELECTOR PLAN 5
Multiple pressure ulcers noted on physical exam with varying stages (I-III), some with peeling and discoloration, possible tissue loss  - Son reports pt using Santyl topical at home  - Consult Wound Care

## 2023-12-20 NOTE — H&P ADULT - PROBLEM SELECTOR PLAN 3
Hx of dysphagia since 2-3 months ago per son  - Notable for severe b/l ext contracture and cachexia  - Low PO intake at baseline, no PO intake in the last 2-3 days  - Continue mIVF  - Speech and swallow evaluation once mental status appropriate  - Discuss options for possible tube feeding Hx of dysphagia since 2-3 months ago per son  - Notable for severe b/l ext contracture and cachexia  - Low PO intake at baseline, no PO intake in the last 2-3 days  - Continue mIVF (D5W for hypernatremia)  - Order speech and swallow evaluation once mental status appropriate  - Discuss options for possible tube feeding with family ( is HCP)

## 2023-12-20 NOTE — ED PROVIDER NOTE - NS ED ATTENDING STATEMENT MOD
This was a shared visit with the MARIAN. I reviewed and verified the documentation and independently performed the documented:

## 2023-12-20 NOTE — ED ADULT NURSE NOTE - OBJECTIVE STATEMENT
67 yo female with a PMH of TBI 2 years ago, aphasia, CVA in 2010, Breast Ca, hypothyroidism presents to the ED via EMS from home with  at bedside complaining of decreased PO intake and AMS over the past 2-3 days.  is at bedside providing collateral, reports that she has been having difficulty swallowing food and as of 2-3 days ago has been keeping food in her mouth and not swallowing. Reports that it has been getting worse and has not been drinking.  reports that she is more lethargic. Usually answers questions at baseline. Patient not answering any questions at this time, grimacing noted when obtaining PIV but otherwise flat affect at this time. Bruising noted on L lateral thigh, as per  patient fell 2 months ago and broke her hip. 69 yo female with a PMH of TBI 2 years ago, aphasia, CVA in 2010, Breast Ca, hypothyroidism presents to the ED via EMS from home with  at bedside complaining of decreased PO intake and AMS over the past 2-3 days.  is at bedside providing collateral, reports that she has been having difficulty swallowing food and as of 2-3 days ago has been keeping food in her mouth and not swallowing. Reports that it has been getting worse and has not been drinking.  reports that she is more lethargic. Usually answers questions at baseline. Patient not answering any questions at this time, grimacing noted when obtaining PIV but otherwise flat affect at this time. Bruising noted on L lateral thigh, as per  patient fell 2 months ago and broke her hip.

## 2023-12-20 NOTE — H&P ADULT - NSHPPHYSICALEXAM_GEN_ALL_CORE
T(C): 36.6 (12-20-23 @ 19:56), Max: 36.8 (12-20-23 @ 18:34)  HR: 78 (12-20-23 @ 19:56) (78 - 89)  BP: 90/60 (12-20-23 @ 19:56) (90/60 - 125/80)  RR: 16 (12-20-23 @ 19:56) (15 - 17)  SpO2: 96% (12-20-23 @ 19:56) (96% - 99%)    CONSTITUTIONAL: in NAD, appears cachectic and chronically ill  EYES: PERRLA and symmetric, unable to assess EOMI, No conjunctival or scleral injection, non-icteric  ENMT: Dry membranes. poor dentition; no pharyngeal injection or exudates             NECK: Supple, symmetric and without tracheal deviation   RESP: No respiratory distress, no use of accessory muscles; CTA b/l, no WRR  CV: RRR, +S1S2, no MRG; no JVD; no peripheral edema  GI: Soft, NT, ND, no rebound, no guarding; no palpable masses; no hepatosplenomegaly; no hernia palpated  LYMPH: No cervical LAD or tenderness; no axillary LAD or tenderness; no inguinal LAD or tenderness  MSK: b/l upper and lower extremity contracture with significant cachexia noted; no digital clubbing or cyanosis  SKIN: No rashes or ulcers noted; no subcutaneous nodules or induration palpable  NEURO: CN II-XII intact; normal reflexes in upper and lower extremities, sensation intact in upper and lower extremities b/l to light touch   PSYCH: Appropriate insight/judgment; A+O x 3, mood and affect appropriate, recent/remote memory intact T(C): 36.6 (12-20-23 @ 19:56), Max: 36.8 (12-20-23 @ 18:34)  HR: 78 (12-20-23 @ 19:56) (78 - 89)  BP: 90/60 (12-20-23 @ 19:56) (90/60 - 125/80)  RR: 16 (12-20-23 @ 19:56) (15 - 17)  SpO2: 96% (12-20-23 @ 19:56) (96% - 99%)    CONSTITUTIONAL: in NAD, appears cachectic and chronically ill  EYES: PERRLA and symmetric, unable to assess EOMI, No conjunctival or scleral injection, non-icteric  ENMT: Dry membranes. poor dentition; no pharyngeal injection or exudates             NECK: Supple, symmetric and without tracheal deviation   RESP: No respiratory distress, no use of accessory muscles; CTA b/l, no WRR  CV: RRR, +S1S2, no MRG; no JVD; no peripheral edema  GI: Soft, NT, ND, no rebound, no guarding; no palpable masses; no hepatosplenomegaly; no hernia palpated  LYMPH: No cervical LAD or tenderness; no axillary LAD or tenderness; no inguinal LAD or tenderness  MSK: b/l upper and lower extremity contracture with significant cachexia noted; no digital clubbing or cyanosis  SKIN: Multiple pressure ulcers (erythematous, some with peeling and discoloration) of varying stages (stages I-III) observed on b/l hips, b/l sacral regions, and R lateral malleolus  NEURO: CN II-XII intact; normal reflexes in upper and lower extremities, sensation intact in upper and lower extremities b/l to light touch   PSYCH: AOx1, opens eyes to voice but non-verbal, unable to maintain attention or follow commands

## 2023-12-20 NOTE — H&P ADULT - ASSESSMENT
68F with PMH significant for breast cancer s/p L mastectomy, TBI (s/p a fall ~2 years ago) c/b aphasia, meningioma, hypothyroidism (hx Grave's dz s/p radioactive iodine), CVA, and dysphagia who presents to [facility] ED on 12/20/2023 with no PO intake and increased lethargy in the last 2-3 days. Found to have significant hypernatremia to 166 (checking q6h), positive UA with borderline leukocytosis with PMN predominance. Pending culture results. RVP and CTH negative. Afebrile, on RA, hypotensive to 90/60 s/p 1L IVF bolus. Physical exam notable for encephalopathy AOx0 and non-verbal, cachexia and b/l upper/lower ext contracture, and multiple pressure ulcers of varying stages (I-III).   68F with PMH significant for breast cancer s/p L mastectomy, TBI (s/p a fall ~2 years ago) c/b aphasia, meningioma, hypothyroidism (hx Grave's dz s/p radioactive iodine), CVA, and dysphagia who presents to [facility] ED on 12/20/2023 with no PO intake and increased lethargy in the last 2-3 days. Found to have significant hypernatremia to 166 (checking q6h), positive UA with borderline leukocytosis with PMN predominance. Pending culture results. RVP and CTH negative. Afebrile, on RA, hypotensive to 90/60 s/p 1L IVF bolus. Physical exam notable for encephalopathy AOx0 and non-verbal, cachexia and b/l upper/lower ext contracture, and multiple pressure ulcers of varying stages (I-III).

## 2023-12-20 NOTE — ED ADULT NURSE NOTE - NSFALLHARMRISKINTERV_ED_ALL_ED
Assistance OOB with selected safe patient handling equipment if applicable/Assistance with ambulation/Communicate risk of Fall with Harm to all staff, patient, and family/Monitor gait and stability/Monitor for mental status changes and reorient to person, place, and time, as needed/Provide visual cue: red socks, yellow wristband, yellow gown, etc/Reinforce activity limits and safety measures with patient and family/Toileting schedule using arm’s reach rule for commode and bathroom/Use of alarms - bed, stretcher, chair and/or video monitoring/Bed in lowest position, wheels locked, appropriate side rails in place/Call bell, personal items and telephone in reach/Instruct patient to call for assistance before getting out of bed/chair/stretcher/Non-slip footwear applied when patient is off stretcher/[place] to call system/Physically safe environment - no spills, clutter or unnecessary equipment/Purposeful Proactive Rounding/Room/bathroom lighting operational, light cord in reach Assistance OOB with selected safe patient handling equipment if applicable/Assistance with ambulation/Communicate risk of Fall with Harm to all staff, patient, and family/Monitor gait and stability/Monitor for mental status changes and reorient to person, place, and time, as needed/Provide visual cue: red socks, yellow wristband, yellow gown, etc/Reinforce activity limits and safety measures with patient and family/Toileting schedule using arm’s reach rule for commode and bathroom/Use of alarms - bed, stretcher, chair and/or video monitoring/Bed in lowest position, wheels locked, appropriate side rails in place/Call bell, personal items and telephone in reach/Instruct patient to call for assistance before getting out of bed/chair/stretcher/Non-slip footwear applied when patient is off stretcher/[place] to call system/Physically safe environment - no spills, clutter or unnecessary equipment/Purposeful Proactive Rounding/Room/bathroom lighting operational, light cord in reach

## 2023-12-20 NOTE — H&P ADULT - PROBLEM SELECTOR PLAN 6
Hx of breast cancer, s/p L mastectomy  - Continue home Tamoxifen Hx of breast cancer, s/p L mastectomy  - Holding home Tamoxifen (NPO)

## 2023-12-21 DIAGNOSIS — E87.0 HYPEROSMOLALITY AND HYPERNATREMIA: ICD-10-CM

## 2023-12-21 DIAGNOSIS — G93.40 ENCEPHALOPATHY, UNSPECIFIED: ICD-10-CM

## 2023-12-21 DIAGNOSIS — N39.0 URINARY TRACT INFECTION, SITE NOT SPECIFIED: ICD-10-CM

## 2023-12-21 DIAGNOSIS — E43 UNSPECIFIED SEVERE PROTEIN-CALORIE MALNUTRITION: ICD-10-CM

## 2023-12-21 DIAGNOSIS — Z29.9 ENCOUNTER FOR PROPHYLACTIC MEASURES, UNSPECIFIED: ICD-10-CM

## 2023-12-21 DIAGNOSIS — E03.9 HYPOTHYROIDISM, UNSPECIFIED: ICD-10-CM

## 2023-12-21 DIAGNOSIS — L89.90 PRESSURE ULCER OF UNSPECIFIED SITE, UNSPECIFIED STAGE: ICD-10-CM

## 2023-12-21 DIAGNOSIS — Z85.3 PERSONAL HISTORY OF MALIGNANT NEOPLASM OF BREAST: ICD-10-CM

## 2023-12-21 LAB
ALBUMIN SERPL ELPH-MCNC: 2.8 G/DL — LOW (ref 3.3–5)
ALBUMIN SERPL ELPH-MCNC: 2.8 G/DL — LOW (ref 3.3–5)
ALP SERPL-CCNC: 71 U/L — SIGNIFICANT CHANGE UP (ref 40–120)
ALP SERPL-CCNC: 71 U/L — SIGNIFICANT CHANGE UP (ref 40–120)
ALT FLD-CCNC: 11 U/L — SIGNIFICANT CHANGE UP (ref 10–45)
ALT FLD-CCNC: 11 U/L — SIGNIFICANT CHANGE UP (ref 10–45)
ANION GAP SERPL CALC-SCNC: 10 MMOL/L — SIGNIFICANT CHANGE UP (ref 5–17)
ANION GAP SERPL CALC-SCNC: 10 MMOL/L — SIGNIFICANT CHANGE UP (ref 5–17)
ANION GAP SERPL CALC-SCNC: 11 MMOL/L — SIGNIFICANT CHANGE UP (ref 5–17)
ANION GAP SERPL CALC-SCNC: 11 MMOL/L — SIGNIFICANT CHANGE UP (ref 5–17)
ANION GAP SERPL CALC-SCNC: 12 MMOL/L — SIGNIFICANT CHANGE UP (ref 5–17)
ANION GAP SERPL CALC-SCNC: 12 MMOL/L — SIGNIFICANT CHANGE UP (ref 5–17)
ANION GAP SERPL CALC-SCNC: 5 MMOL/L — SIGNIFICANT CHANGE UP (ref 5–17)
ANION GAP SERPL CALC-SCNC: 5 MMOL/L — SIGNIFICANT CHANGE UP (ref 5–17)
AST SERPL-CCNC: 12 U/L — SIGNIFICANT CHANGE UP (ref 10–40)
AST SERPL-CCNC: 12 U/L — SIGNIFICANT CHANGE UP (ref 10–40)
BASOPHILS # BLD AUTO: 0.01 K/UL — SIGNIFICANT CHANGE UP (ref 0–0.2)
BASOPHILS # BLD AUTO: 0.01 K/UL — SIGNIFICANT CHANGE UP (ref 0–0.2)
BASOPHILS NFR BLD AUTO: 0.1 % — SIGNIFICANT CHANGE UP (ref 0–2)
BASOPHILS NFR BLD AUTO: 0.1 % — SIGNIFICANT CHANGE UP (ref 0–2)
BILIRUB SERPL-MCNC: 0.6 MG/DL — SIGNIFICANT CHANGE UP (ref 0.2–1.2)
BILIRUB SERPL-MCNC: 0.6 MG/DL — SIGNIFICANT CHANGE UP (ref 0.2–1.2)
BUN SERPL-MCNC: 20 MG/DL — SIGNIFICANT CHANGE UP (ref 7–23)
BUN SERPL-MCNC: 24 MG/DL — HIGH (ref 7–23)
BUN SERPL-MCNC: 24 MG/DL — HIGH (ref 7–23)
BUN SERPL-MCNC: 27 MG/DL — HIGH (ref 7–23)
BUN SERPL-MCNC: 27 MG/DL — HIGH (ref 7–23)
CALCIUM SERPL-MCNC: 7.6 MG/DL — LOW (ref 8.4–10.5)
CALCIUM SERPL-MCNC: 7.6 MG/DL — LOW (ref 8.4–10.5)
CALCIUM SERPL-MCNC: 8 MG/DL — LOW (ref 8.4–10.5)
CALCIUM SERPL-MCNC: 8 MG/DL — LOW (ref 8.4–10.5)
CALCIUM SERPL-MCNC: 8.3 MG/DL — LOW (ref 8.4–10.5)
CALCIUM SERPL-MCNC: 8.3 MG/DL — LOW (ref 8.4–10.5)
CALCIUM SERPL-MCNC: 8.5 MG/DL — SIGNIFICANT CHANGE UP (ref 8.4–10.5)
CALCIUM SERPL-MCNC: 8.5 MG/DL — SIGNIFICANT CHANGE UP (ref 8.4–10.5)
CHLORIDE SERPL-SCNC: 120 MMOL/L — HIGH (ref 96–108)
CHLORIDE SERPL-SCNC: 120 MMOL/L — HIGH (ref 96–108)
CHLORIDE SERPL-SCNC: 125 MMOL/L — HIGH (ref 96–108)
CHLORIDE SERPL-SCNC: 128 MMOL/L — HIGH (ref 96–108)
CHLORIDE SERPL-SCNC: 128 MMOL/L — HIGH (ref 96–108)
CO2 SERPL-SCNC: 24 MMOL/L — SIGNIFICANT CHANGE UP (ref 22–31)
CO2 SERPL-SCNC: 24 MMOL/L — SIGNIFICANT CHANGE UP (ref 22–31)
CO2 SERPL-SCNC: 25 MMOL/L — SIGNIFICANT CHANGE UP (ref 22–31)
CO2 SERPL-SCNC: 26 MMOL/L — SIGNIFICANT CHANGE UP (ref 22–31)
CO2 SERPL-SCNC: 26 MMOL/L — SIGNIFICANT CHANGE UP (ref 22–31)
CREAT SERPL-MCNC: 0.47 MG/DL — LOW (ref 0.5–1.3)
CREAT SERPL-MCNC: 0.47 MG/DL — LOW (ref 0.5–1.3)
CREAT SERPL-MCNC: 0.52 MG/DL — SIGNIFICANT CHANGE UP (ref 0.5–1.3)
CREAT SERPL-MCNC: 0.52 MG/DL — SIGNIFICANT CHANGE UP (ref 0.5–1.3)
CREAT SERPL-MCNC: 0.54 MG/DL — SIGNIFICANT CHANGE UP (ref 0.5–1.3)
CREAT SERPL-MCNC: 0.54 MG/DL — SIGNIFICANT CHANGE UP (ref 0.5–1.3)
CREAT SERPL-MCNC: 0.58 MG/DL — SIGNIFICANT CHANGE UP (ref 0.5–1.3)
CREAT SERPL-MCNC: 0.58 MG/DL — SIGNIFICANT CHANGE UP (ref 0.5–1.3)
EGFR: 100 ML/MIN/1.73M2 — SIGNIFICANT CHANGE UP
EGFR: 100 ML/MIN/1.73M2 — SIGNIFICANT CHANGE UP
EGFR: 102 ML/MIN/1.73M2 — SIGNIFICANT CHANGE UP
EGFR: 102 ML/MIN/1.73M2 — SIGNIFICANT CHANGE UP
EGFR: 97 ML/MIN/1.73M2 — SIGNIFICANT CHANGE UP
EGFR: 97 ML/MIN/1.73M2 — SIGNIFICANT CHANGE UP
EGFR: 99 ML/MIN/1.73M2 — SIGNIFICANT CHANGE UP
EGFR: 99 ML/MIN/1.73M2 — SIGNIFICANT CHANGE UP
EOSINOPHIL # BLD AUTO: 0.01 K/UL — SIGNIFICANT CHANGE UP (ref 0–0.5)
EOSINOPHIL # BLD AUTO: 0.01 K/UL — SIGNIFICANT CHANGE UP (ref 0–0.5)
EOSINOPHIL NFR BLD AUTO: 0.1 % — SIGNIFICANT CHANGE UP (ref 0–6)
EOSINOPHIL NFR BLD AUTO: 0.1 % — SIGNIFICANT CHANGE UP (ref 0–6)
GLUCOSE SERPL-MCNC: 102 MG/DL — HIGH (ref 70–99)
GLUCOSE SERPL-MCNC: 102 MG/DL — HIGH (ref 70–99)
GLUCOSE SERPL-MCNC: 108 MG/DL — HIGH (ref 70–99)
GLUCOSE SERPL-MCNC: 108 MG/DL — HIGH (ref 70–99)
GLUCOSE SERPL-MCNC: 123 MG/DL — HIGH (ref 70–99)
GLUCOSE SERPL-MCNC: 123 MG/DL — HIGH (ref 70–99)
GLUCOSE SERPL-MCNC: 240 MG/DL — HIGH (ref 70–99)
GLUCOSE SERPL-MCNC: 240 MG/DL — HIGH (ref 70–99)
HCT VFR BLD CALC: 36.5 % — SIGNIFICANT CHANGE UP (ref 34.5–45)
HCT VFR BLD CALC: 36.5 % — SIGNIFICANT CHANGE UP (ref 34.5–45)
HGB BLD-MCNC: 11 G/DL — LOW (ref 11.5–15.5)
HGB BLD-MCNC: 11 G/DL — LOW (ref 11.5–15.5)
IMM GRANULOCYTES NFR BLD AUTO: 0.4 % — SIGNIFICANT CHANGE UP (ref 0–0.9)
IMM GRANULOCYTES NFR BLD AUTO: 0.4 % — SIGNIFICANT CHANGE UP (ref 0–0.9)
LYMPHOCYTES # BLD AUTO: 1.32 K/UL — SIGNIFICANT CHANGE UP (ref 1–3.3)
LYMPHOCYTES # BLD AUTO: 1.32 K/UL — SIGNIFICANT CHANGE UP (ref 1–3.3)
LYMPHOCYTES # BLD AUTO: 17.1 % — SIGNIFICANT CHANGE UP (ref 13–44)
LYMPHOCYTES # BLD AUTO: 17.1 % — SIGNIFICANT CHANGE UP (ref 13–44)
MAGNESIUM SERPL-MCNC: 2.3 MG/DL — SIGNIFICANT CHANGE UP (ref 1.6–2.6)
MAGNESIUM SERPL-MCNC: 2.3 MG/DL — SIGNIFICANT CHANGE UP (ref 1.6–2.6)
MCHC RBC-ENTMCNC: 27.1 PG — SIGNIFICANT CHANGE UP (ref 27–34)
MCHC RBC-ENTMCNC: 27.1 PG — SIGNIFICANT CHANGE UP (ref 27–34)
MCHC RBC-ENTMCNC: 30.1 GM/DL — LOW (ref 32–36)
MCHC RBC-ENTMCNC: 30.1 GM/DL — LOW (ref 32–36)
MCV RBC AUTO: 89.9 FL — SIGNIFICANT CHANGE UP (ref 80–100)
MCV RBC AUTO: 89.9 FL — SIGNIFICANT CHANGE UP (ref 80–100)
MONOCYTES # BLD AUTO: 0.35 K/UL — SIGNIFICANT CHANGE UP (ref 0–0.9)
MONOCYTES # BLD AUTO: 0.35 K/UL — SIGNIFICANT CHANGE UP (ref 0–0.9)
MONOCYTES NFR BLD AUTO: 4.5 % — SIGNIFICANT CHANGE UP (ref 2–14)
MONOCYTES NFR BLD AUTO: 4.5 % — SIGNIFICANT CHANGE UP (ref 2–14)
NEUTROPHILS # BLD AUTO: 6 K/UL — SIGNIFICANT CHANGE UP (ref 1.8–7.4)
NEUTROPHILS # BLD AUTO: 6 K/UL — SIGNIFICANT CHANGE UP (ref 1.8–7.4)
NEUTROPHILS NFR BLD AUTO: 77.8 % — HIGH (ref 43–77)
NEUTROPHILS NFR BLD AUTO: 77.8 % — HIGH (ref 43–77)
NRBC # BLD: 0 /100 WBCS — SIGNIFICANT CHANGE UP (ref 0–0)
NRBC # BLD: 0 /100 WBCS — SIGNIFICANT CHANGE UP (ref 0–0)
PHOSPHATE SERPL-MCNC: 2.1 MG/DL — LOW (ref 2.5–4.5)
PHOSPHATE SERPL-MCNC: 2.1 MG/DL — LOW (ref 2.5–4.5)
PLATELET # BLD AUTO: 169 K/UL — SIGNIFICANT CHANGE UP (ref 150–400)
PLATELET # BLD AUTO: 169 K/UL — SIGNIFICANT CHANGE UP (ref 150–400)
POTASSIUM SERPL-MCNC: 3.1 MMOL/L — LOW (ref 3.5–5.3)
POTASSIUM SERPL-MCNC: 3.1 MMOL/L — LOW (ref 3.5–5.3)
POTASSIUM SERPL-MCNC: 3.2 MMOL/L — LOW (ref 3.5–5.3)
POTASSIUM SERPL-MCNC: 3.2 MMOL/L — LOW (ref 3.5–5.3)
POTASSIUM SERPL-MCNC: 3.5 MMOL/L — SIGNIFICANT CHANGE UP (ref 3.5–5.3)
POTASSIUM SERPL-MCNC: 3.5 MMOL/L — SIGNIFICANT CHANGE UP (ref 3.5–5.3)
POTASSIUM SERPL-MCNC: 8.5 MMOL/L — CRITICAL HIGH (ref 3.5–5.3)
POTASSIUM SERPL-MCNC: 8.5 MMOL/L — CRITICAL HIGH (ref 3.5–5.3)
POTASSIUM SERPL-SCNC: 3.1 MMOL/L — LOW (ref 3.5–5.3)
POTASSIUM SERPL-SCNC: 3.1 MMOL/L — LOW (ref 3.5–5.3)
POTASSIUM SERPL-SCNC: 3.2 MMOL/L — LOW (ref 3.5–5.3)
POTASSIUM SERPL-SCNC: 3.2 MMOL/L — LOW (ref 3.5–5.3)
POTASSIUM SERPL-SCNC: 3.5 MMOL/L — SIGNIFICANT CHANGE UP (ref 3.5–5.3)
POTASSIUM SERPL-SCNC: 3.5 MMOL/L — SIGNIFICANT CHANGE UP (ref 3.5–5.3)
POTASSIUM SERPL-SCNC: 8.5 MMOL/L — CRITICAL HIGH (ref 3.5–5.3)
POTASSIUM SERPL-SCNC: 8.5 MMOL/L — CRITICAL HIGH (ref 3.5–5.3)
PROT SERPL-MCNC: 5.6 G/DL — LOW (ref 6–8.3)
PROT SERPL-MCNC: 5.6 G/DL — LOW (ref 6–8.3)
RBC # BLD: 4.06 M/UL — SIGNIFICANT CHANGE UP (ref 3.8–5.2)
RBC # BLD: 4.06 M/UL — SIGNIFICANT CHANGE UP (ref 3.8–5.2)
RBC # FLD: 14.8 % — HIGH (ref 10.3–14.5)
RBC # FLD: 14.8 % — HIGH (ref 10.3–14.5)
SODIUM SERPL-SCNC: 154 MMOL/L — HIGH (ref 135–145)
SODIUM SERPL-SCNC: 154 MMOL/L — HIGH (ref 135–145)
SODIUM SERPL-SCNC: 156 MMOL/L — HIGH (ref 135–145)
SODIUM SERPL-SCNC: 156 MMOL/L — HIGH (ref 135–145)
SODIUM SERPL-SCNC: 161 MMOL/L — CRITICAL HIGH (ref 135–145)
SODIUM SERPL-SCNC: 161 MMOL/L — CRITICAL HIGH (ref 135–145)
SODIUM SERPL-SCNC: 165 MMOL/L — CRITICAL HIGH (ref 135–145)
SODIUM SERPL-SCNC: 165 MMOL/L — CRITICAL HIGH (ref 135–145)
TSH SERPL-MCNC: 2.82 UIU/ML — SIGNIFICANT CHANGE UP (ref 0.27–4.2)
TSH SERPL-MCNC: 2.82 UIU/ML — SIGNIFICANT CHANGE UP (ref 0.27–4.2)
WBC # BLD: 7.72 K/UL — SIGNIFICANT CHANGE UP (ref 3.8–10.5)
WBC # BLD: 7.72 K/UL — SIGNIFICANT CHANGE UP (ref 3.8–10.5)
WBC # FLD AUTO: 7.72 K/UL — SIGNIFICANT CHANGE UP (ref 3.8–10.5)
WBC # FLD AUTO: 7.72 K/UL — SIGNIFICANT CHANGE UP (ref 3.8–10.5)

## 2023-12-21 PROCEDURE — 99232 SBSQ HOSP IP/OBS MODERATE 35: CPT | Mod: GC

## 2023-12-21 RX ORDER — POTASSIUM PHOSPHATE, MONOBASIC POTASSIUM PHOSPHATE, DIBASIC 236; 224 MG/ML; MG/ML
30 INJECTION, SOLUTION INTRAVENOUS ONCE
Refills: 0 | Status: DISCONTINUED | OUTPATIENT
Start: 2023-12-21 | End: 2023-12-21

## 2023-12-21 RX ORDER — ENOXAPARIN SODIUM 100 MG/ML
40 INJECTION SUBCUTANEOUS EVERY 24 HOURS
Refills: 0 | Status: DISCONTINUED | OUTPATIENT
Start: 2023-12-21 | End: 2024-01-01

## 2023-12-21 RX ORDER — POTASSIUM CHLORIDE 20 MEQ
10 PACKET (EA) ORAL
Refills: 0 | Status: COMPLETED | OUTPATIENT
Start: 2023-12-21 | End: 2023-12-21

## 2023-12-21 RX ORDER — POTASSIUM CHLORIDE 20 MEQ
10 PACKET (EA) ORAL
Refills: 0 | Status: DISCONTINUED | OUTPATIENT
Start: 2023-12-21 | End: 2023-12-21

## 2023-12-21 RX ORDER — SODIUM CHLORIDE 9 MG/ML
1000 INJECTION, SOLUTION INTRAVENOUS
Refills: 0 | Status: COMPLETED | OUTPATIENT
Start: 2023-12-21 | End: 2023-12-21

## 2023-12-21 RX ORDER — POTASSIUM CHLORIDE 20 MEQ
20 PACKET (EA) ORAL
Refills: 0 | Status: DISCONTINUED | OUTPATIENT
Start: 2023-12-21 | End: 2023-12-21

## 2023-12-21 RX ORDER — SODIUM CHLORIDE 9 MG/ML
500 INJECTION INTRAMUSCULAR; INTRAVENOUS; SUBCUTANEOUS
Refills: 0 | Status: DISCONTINUED | OUTPATIENT
Start: 2023-12-21 | End: 2023-12-22

## 2023-12-21 RX ORDER — LEVOTHYROXINE SODIUM 125 MCG
56 TABLET ORAL AT BEDTIME
Refills: 0 | Status: DISCONTINUED | OUTPATIENT
Start: 2023-12-21 | End: 2023-12-24

## 2023-12-21 RX ORDER — CEFTRIAXONE 500 MG/1
1000 INJECTION, POWDER, FOR SOLUTION INTRAMUSCULAR; INTRAVENOUS EVERY 24 HOURS
Refills: 0 | Status: COMPLETED | OUTPATIENT
Start: 2023-12-21 | End: 2023-12-23

## 2023-12-21 RX ADMIN — Medication 100 MILLIEQUIVALENT(S): at 13:43

## 2023-12-21 RX ADMIN — Medication 100 MILLIEQUIVALENT(S): at 20:04

## 2023-12-21 RX ADMIN — Medication 56 MICROGRAM(S): at 02:43

## 2023-12-21 RX ADMIN — Medication 100 MILLIEQUIVALENT(S): at 22:31

## 2023-12-21 RX ADMIN — ENOXAPARIN SODIUM 40 MILLIGRAM(S): 100 INJECTION SUBCUTANEOUS at 04:57

## 2023-12-21 RX ADMIN — SODIUM CHLORIDE 60 MILLILITER(S): 9 INJECTION, SOLUTION INTRAVENOUS at 02:27

## 2023-12-21 RX ADMIN — Medication 56 MICROGRAM(S): at 21:22

## 2023-12-21 RX ADMIN — SODIUM CHLORIDE 50 MILLILITER(S): 9 INJECTION INTRAMUSCULAR; INTRAVENOUS; SUBCUTANEOUS at 20:04

## 2023-12-21 RX ADMIN — Medication 100 MILLIEQUIVALENT(S): at 21:17

## 2023-12-21 RX ADMIN — CEFTRIAXONE 100 MILLIGRAM(S): 500 INJECTION, POWDER, FOR SOLUTION INTRAMUSCULAR; INTRAVENOUS at 12:52

## 2023-12-21 RX ADMIN — SODIUM CHLORIDE 50 MILLILITER(S): 9 INJECTION INTRAMUSCULAR; INTRAVENOUS; SUBCUTANEOUS at 21:23

## 2023-12-21 NOTE — PROGRESS NOTE ADULT - PROBLEM SELECTOR PLAN 4
Hx of recurrent UTI (last admission 9/2023)  - Positive UA, s/p IV CTX 1 mg x1 in ED  - Afebrile, hypotensive but HDS  - Continue IV CTX 1 mg QD for empiric UTI treatment, pending urine cultures to narrow abx spectrum

## 2023-12-21 NOTE — PROGRESS NOTE ADULT - ATTENDING COMMENTS
69yo F PMH breast cancer s/p L mastectomy, TBI (s/p a fall ~2 years ago) c/b aphasia, meningioma, hypothyroidism (hx Grave's dz s/p radioactive iodine), CVA, and dysphagia, admission 9/19-9/23/23 for hypernatremia presents to [facility] ED on 12/20/2023 with no PO intake and increased lethargy found to have metabolic encephalopathy due to significant hypernatremia to 166 (checking q6h) and possible UTI, guarded prognosis with malnutrition and multiple pressure ulcers.    1. Acute metabolic encephalopathy: P/w progressively worsening lethargy/encephalopathy i/s/o no PO intake for 2-3 days; at baseline, answers simple questions (Y/N, name) per report. Most likely ddx include metabolic (hypernatremia) vs infection (c/f UTI) vs mixed.  CT head with No acute hemorrhage mass or mass effect is seen.  - Currently AOx0, non-verbal, opens eyes to voice, unable to maintain attention or follow commands  - Manage chronic hypernatremia  - Positive UA, s/p IV CTX 1 mg x1 in ED  - Continue IV CTX 1 mg QD for empiric UTI treatment, pending urine cultures.  -aspiration precautions  -GOC discussion when able to reach  (I attempted number in intake form: wrong number)    2. Hypernatremia: with ~2 L free water deficit.  Concern for rapid overcorrection 166 to 154.  Will repeat pm Na and if stable at 154 will resume 1/4NS @ 50cc/hour and check bmp q6 hours.  - Target lowering the serum Na by 10 mEq/L in 24h    3. UTI: f/u urine culture.  Continue empiric abx    4. Severe malnutrition: unable to participate in speech/swallow eval at this time.  F/u GOC with family    5.Pressure ulcer: Multiple pressure ulcers noted on physical exam with varying stages (I-III), some with peeling and discoloration, possible tissue loss  - Son reports pt using Santyl topical at home  - Consult Wound Care.    6. H/o breast ca: s/p L mastectomy  - Holding home Tamoxifen (NPO).    7. Hypothyroid: Hx of Grave's disease s/p radioactive iodine  - Continue home levothyroxine in IV ( mcg --> IV 56 mcg QD)  - TSH wnl    8.  PPX: lovenox    contact: TEAMS 69yo F PMH breast cancer s/p L mastectomy, TBI (s/p a fall ~2 years ago) c/b aphasia, meningioma, hypothyroidism (hx Grave's dz s/p radioactive iodine), CVA, and dysphagia, admission 9/19-9/23/23 for hypernatremia presents to [facility] ED on 12/20/2023 with no PO intake and increased lethargy found to have metabolic encephalopathy due to significant hypernatremia to 166 (checking q6h) and possible UTI, guarded prognosis with malnutrition and multiple pressure ulcers.    1. Acute metabolic encephalopathy: P/w progressively worsening lethargy/encephalopathy i/s/o no PO intake for 2-3 days; at baseline, answers simple questions (Y/N, name) per report. Most likely ddx include metabolic (hypernatremia) vs infection (c/f UTI) vs mixed.  CT head with No acute hemorrhage mass or mass effect is seen.  - Currently AOx0, non-verbal, opens eyes to voice, unable to maintain attention or follow commands  - Manage chronic hypernatremia  - Positive UA, s/p IV CTX 1 mg x1 in ED  - Continue IV CTX 1 mg QD for empiric UTI treatment, pending urine cultures.  -aspiration precautions  -GOC discussion when able to reach  (I attempted number in intake form: wrong number)    2. Hypernatremia: with ~2 L free water deficit.  Concern for rapid overcorrection 166 to 154.  Will repeat pm Na and if stable at 154 will resume 1/4NS @ 50cc/hour and check bmp q6 hours.  - Target lowering the serum Na by 10 mEq/L in 24h    3. UTI: f/u urine culture.  Continue empiric abx    4. Severe malnutrition: unable to participate in speech/swallow eval at this time.  F/u GOC with family    5.Pressure ulcer: Multiple pressure ulcers noted on physical exam with varying stages (I-III), some with peeling and discoloration, possible tissue loss  - Son reports pt using Santyl topical at home  - Consult Wound Care.    6. H/o breast ca: s/p L mastectomy  - Holding home Tamoxifen (NPO).    7. Hypothyroid: Hx of Grave's disease s/p radioactive iodine  - Continue home levothyroxine in IV ( mcg --> IV 56 mcg QD)  - TSH wnl    8.  PPX: lovenox    contact: TEAMS

## 2023-12-21 NOTE — DIETITIAN INITIAL EVALUATION ADULT - REASON INDICATOR FOR ASSESSMENT
Consult - MST score 2 or >, Pressure injury stage 2 or >   Charts Reviewed, Events Noted   Sources: Medical Record, Patient,  (Silverio) at bedside Consult - MST score 2 or >, Pressure injury stage 2 or >   Charts Reviewed, Events Noted   Sources: Medical Record, Patient,  (Silverio) at bedside, Team

## 2023-12-21 NOTE — PROGRESS NOTE ADULT - PROBLEM SELECTOR PLAN 1
P/w progressively worsening lethargy/encephalopathy i/s/o no PO intake for 2-3 days; at baseline, answers simple questions (Y/N, name) per son  - Most likely ddx include metabolic (hypernatremia) vs infection (c/f UTI) vs mixed  - Currently AOx0, non-verbal, opens eyes to voice, unable to maintain attention or follow commands  - Manage chronic hypernatremia (see the specific section of the plan)  - Positive UA, s/p IV CTX 1 mg x1 in ED  - Continue IV CTX 1 mg QD for empiric UTI treatment, pending urine cultures

## 2023-12-21 NOTE — PROGRESS NOTE ADULT - PROBLEM SELECTOR PLAN 2
Symptoms of lethargy/encephalopathy >48h with no PO intake, likely has chronic hypernatremia (vs acute) 2/2 severe dehydration  - Free water deficit 3.8L  - Target lowering the serum Na by 10 mEq/L in 24h  - Start IV D5W at 1.35 ml/kg/h (start at 60 ml/h)  - Trend serum Na q6h; once the target  rate of correction attained, liberalize to [id number] until normonatremia attained Symptoms of lethargy/encephalopathy >48h with no PO intake, likely has chronic hypernatremia (vs acute) 2/2 severe dehydration  - Free water deficit 3.8L  - Target lowering the serum Na by 10 mEq/L in 24h  - Start IV D5W at 1.35 ml/kg/h (start at 60 ml/h)  - Trend serum Na q6h; once the target  rate of correction attained, liberalize to [id number] until normonatremia attained Symptoms of lethargy/encephalopathy >48h with no PO intake, likely has chronic hypernatremia (vs acute) 2/2 severe dehydration. Na 166->156. at goal for   - Free water deficit 3.8L  - Target lowering the serum Na by 10 mEq/L in 24h  - Start IV D5W at 1.35 ml/kg/h (start at 60 ml/h)  - Trend serum Na q6h; once the target  rate of correction attained, liberalize to [id number] until normonatremia attained Symptoms of lethargy/encephalopathy >48h with no PO intake, likely has chronic hypernatremia (vs acute) 2/2 severe dehydration. Na 166->156. at goal for   - Free water deficit 3.8L  - Target lowering the serum Na by 10 mEq/L in 24h  - Start IV D5W at 1.35 ml/kg/h (start at 60 ml/h)  - Trend serum Na q6h; once the target  rate of correction attained, liberalize to [id number] until normonatremia attained

## 2023-12-21 NOTE — ADVANCED PRACTICE NURSE CONSULT - REASON FOR CONSULT
Consult to assess patient skin initiated by RN sacrum, bilateral  hip, left buttock and left shoulder. present on admission.    History of Present Illness:  Reason for Admission: No PO intake, lethargy  History of Present Illness:   68F with PMH significant for breast cancer s/p L mastectomy, TBI (s/p a fall ~2 years ago) c/b aphasia, meningioma, hypothyroidism (hx Grave's dz s/p radioactive iodine), CVA, and dysphagia who presents to [facility] ED on 12/20/2023 with no PO intake and increased lethargy in the last 2-3 days.    On encounter, pt is unable to communicate verbally, only opens eyes to voice. Unable to maintain attention or follow commands at this time. Tried contacting  (HCP), unable to reach at this time; called the son (Henok) to obtain collateral HPI. Per son, pt was verbal up to 2-3 days ago with prompting, usually able to answer Y/N questions or say her name. Pt was also able to tolerate thick liquids such as sauce. Approx 2-3 days ago, pt started not tolerating any PO intake, became significantly more lethargic at home, and was brought to the ED. Pt lives at home with , and is able to walk with a walker and a personal assistance. Reported to have relative left-sided weakness since the fall that led to TBI. Unable to assess ROS currently d/t encephalopathy/lethargy.    Vital signs stable with mild hypotension (T 36.6C, HR 78, BP 90/60, on RA, SpO2 >96%). Initial workup in the ED notable for borderline leukocytosis 10.87 with PMN predominance, significant hypernatremia 160 -> 166 (checking q6h), elevated procal 0.12, and positive UA (dark yellow, cloudy, +ketone, nitrite, 7 WBC, many bacteria). CXR with enlarged cardiomediastinal silhouette and L base atelectasis; no focal consolidations, pleural effusion, or pneumothorax. Lactate 1.3, negative RVP, and CTH negative for acute intracranial pathology (redemonstration of parenchymal volume loss + chronic microvascular changes).    Pt was given empiric IV CTX 1 g x1 and NS bolus 1L in the ED. Blood and urine cultures have been sent. Consult to assess patient skin initiated by RN sacrum, bilateral  hip, left buttock and left shoulder. present on admission.    History of Present Illness:  Reason for Admission: No PO intake, lethargy  History of Present Illness:   68F with PMH significant for breast cancer s/p L mastectomy, TBI (s/p a fall ~2 years ago) c/b aphasia, meningioma, hypothyroidism (hx Grave's dz s/p radioactive iodine), CVA, and dysphagia who presents to [facility] ED on 12/20/2023 with no PO intake and increased lethargy in the last 2-3 days.    On encounter, pt is unable to communicate verbally, only opens eyes to voice. Unable to maintain attention or follow commands at this time. Tried contacting  (HCP), unable to reach at this time; called the son (Henok) to obtain collateral HPI. Per son, pt was verbal up to 2-3 days ago with prompting, usually able to answer Y/N questions or say her name. Pt was also able to tolerate thick liquids such as sauce. Approx 2-3 days ago, pt started not tolerating any PO intake, became significantly more lethargic at home, and was brought to the ED. Pt lives at home with , and is able to walk with a walker and a personal assistance. Reported to have relative left-sided weakness since the fall that led to TBI. Unable to assess ROS currently d/t encephalopathy/lethargy.    Vital signs stable with mild hypotension (T 36.6C, HR 78, BP 90/60, on RA, SpO2 >96%). Initial workup in the ED notable for borderline leukocytosis 10.87 with PMN predominance, significant hypernatremia 160 -> 166 (checking q6h), elevated procal 0.12, and positive UA (dark yellow, cloudy, +ketone, nitrite, 7 WBC, many bacteria). CXR with enlarged cardiomediastinal silhouette and L base atelectasis; no focal consolidations, pleural effusion, or pneumothorax. Lactate 1.3, negative RVP, and CTH negative for acute intracranial pathology (redemonstration of parenchymal volume loss + chronic microvascular changes).    Pt was given empiric IV CTX 1 g x1 and NS bolus 1L in the ED. Blood and urine cultures have been sent.

## 2023-12-21 NOTE — PHYSICAL THERAPY INITIAL EVALUATION ADULT - PATIENT/FAMILY/SIGNIFICANT OTHER GOALS STATEMENT, PT EVAL
None stated. Call made to emergency contact, son who deferred info re: pt to pt's spouse-no response.

## 2023-12-21 NOTE — DIETITIAN INITIAL EVALUATION ADULT - PROBLEM SELECTOR PLAN 2
Symptoms of lethargy/encephalopathy >48h with no PO intake, likely has chronic hypernatremia (vs acute) 2/2 severe dehydration  - Free water deficit 3.8L  - Target lowering the serum Na by 10 mEq/L in 24h  - Start IV D5W at 1.35 ml/kg/h (start at 60 ml/h)  - Trend serum Na q6h; once the target  rate of correction attained, liberalize to [id number] until normonatremia attained Symptoms of lethargy/encephalopathy >48h with no PO intake, likely has chronic hypernatremia (vs acute) 2/2 severe dehydration  - Free water deficit 3.8L  - Target lowering the serum Na by 10 mEq/L in 24h  - Start IV D5W at 1.35 ml/kg/h (start at 60 ml/h)  - Trend serum Na q6h; once the target  rate of correction attained, liberalize to [id number] until normonatremia attained

## 2023-12-21 NOTE — PROGRESS NOTE ADULT - PROBLEM SELECTOR PLAN 8
- DVT ppx: Lovenox (40 mg QD; CrCl >30 ml/min) and SCDs  - Diet: NPO until SLP eval  - PT  - Dispo: pending clinical course

## 2023-12-21 NOTE — PHYSICAL THERAPY INITIAL EVALUATION ADULT - PERTINENT HX OF CURRENT PROBLEM, REHAB EVAL
Pt is a 68F adm to [facility] on12/20/23 with PMH significant for breast cancer s/p L mastectomy, TBI (s/p a fall ~2 years ago) c/b aphasia, meningioma, hypothyroidism (hx Grave's dz s/p radioactive iodine), CVA, and dysphagia who presents to [facility] ED on 12/20/2023 with no PO intake and increased lethargy in the last 2-3 days. On encounter, pt is unable to communicate verbally, only opens eyes to voice. Unable to maintain attention or follow commands at this time. Tried contacting  (HCP), unable to reach at this time; called the son (Henok) to obtain collateral HPI. Per son, pt was verbal up to 2-3 days ago with prompting, usually able to answer Y/N questions or say her name. Pt was also able to tolerate thick liquids such as sauce. Approx 2-3 days ago, pt started not tolerating any PO intake, became significantly more lethargic at home, and was brought to the ED. Pt lives at home with , and is able to walk with a walker and a personal assistance. Reported to have relative left-sided weakness since the fall that led to TBI. Unable to assess ROS currently d/t encephalopathy/lethargy. Vital signs stable with mild hypotension (T 36.6C, HR 78, BP 90/60, on RA, SpO2 >96%). Initial workup in the ED notable for borderline leukocytosis 10.87 with PMN predominance, significant hypernatremia 160 -> 166 (checking q6h), elevated procal 0.12, and positive UA (dark yellow, cloudy, +ketone, nitrite, 7 WBC, many bacteria). CXR with enlarged cardiomediastinal silhouette and L base atelectasis; no focal consolidations, pleural effusion, or pneumothorax. Lactate 1.3, negative RVP, and CTH negative for acute intracranial pathology (redemonstration of parenchymal volume loss + chronic microvascular changes).  Pt was given empiric IV CTX 1 g x1 and NS bolus 1L in the ED. Blood and urine cultures have been sent. Pt is a 68F adm to [facility] on12/20/23 with PMH significant for breast cancer s/p L mastectomy, TBI (s/p a fall ~2 years ago) c/b aphasia, meningioma, hypothyroidism (hx Grave's dz s/p radioactive iodine), CVA, and dysphagia who presents to [facility] ED on 12/20/2023 with no PO intake and increased lethargy in the last 2-3 days. On encounter, pt is unable to communicate verbally, only opens eyes to voice. Unable to maintain attention or follow commands at this time. Tried contacting  (HCP), unable to reach at this time; called the son (Henok) to obtain collateral HPI. Per son, pt was verbal up to 2-3 days ago with prompting, usually able to answer Y/N questions or say her name. Pt was also able to tolerate thick liquids such as sauce. Approx 2-3 days ago, pt started not tolerating any PO intake, became significantly more lethargic at home, and was brought to the ED. Pt lives at home with , and is able to walk with a walker and a personal assistance. Reported to have relative left-sided weakness since the fall that led to TBI. Unable to assess ROS currently d/t encephalopathy/lethargy. Vital signs stable with mild hypotension (T 36.6C, HR 78, BP 90/60, on RA, SpO2 >96%). Initial workup in the ED notable for borderline leukocytosis 10.87 with PMN predominance, significant hypernatremia 160 -> 166 (checking q6h), elevated procal 0.12, and positive UA (dark yellow, cloudy, +ketone, nitrite, 7 WBC, many bacteria). CXR with enlarged cardiomediastinal silhouette and L base atelectasis; no focal consolidations, pleural effusion, or pneumothorax. Lactate 1.3, negative RVP, and CTH negative for acute intracranial pathology (redemonstration of parenchymal volume loss + chronic microvascular changes).  Pt was given empiric IV CTX 1 g x1 and NS bolus 1L in the ED. Blood and urine cultures have been sent.

## 2023-12-21 NOTE — DIETITIAN INITIAL EVALUATION ADULT - NSFNSNUTRHOMESUPPLEMENTFT_GEN_A_CORE
Pt takes multivitamin and folic acid per  and confirmed with H&P. Reports no additional use of oral nutritional supplement.

## 2023-12-21 NOTE — PROGRESS NOTE ADULT - PROBLEM SELECTOR PLAN 3
Hx of dysphagia since 2-3 months ago per son  - Notable for severe b/l ext contracture and cachexia  - Low PO intake at baseline, no PO intake in the last 2-3 days  - Continue mIVF (D5W for hypernatremia)  - Order speech and swallow evaluation once mental status appropriate  - Discuss options for possible tube feeding with family ( is HCP)

## 2023-12-21 NOTE — DIETITIAN INITIAL EVALUATION ADULT - NSFNSPHYEXAMSKINFT_GEN_A_CORE
Right hit DTI, Left buttock DTI, Left hip DTI, Sacrum DTI, Left Shoulder DTI per flow sheets; wound care team to assess patient

## 2023-12-21 NOTE — PROGRESS NOTE ADULT - PROBLEM SELECTOR PLAN 7
Hx of Grave's disease s/p radioactive iodine  - Continue home levothyroxine in IV ( mcg --> IV 56 mcg QD)  - F/u TSH with AM labs

## 2023-12-21 NOTE — PROGRESS NOTE ADULT - SUBJECTIVE AND OBJECTIVE BOX
*******************************  Usha Reyes MD (PGY-1)  Internal Medicine  Contact via [employer] TEAMS  *******************************    YOUNGER, JONATHAN  68y  Female    Patient is a 68y old  Female who presents with a chief complaint of No PO intake, lethargy (20 Dec 2023 21:57)      Subjective:    Objective:  T(C): 36.3 (12-21-23 @ 04:25), Max: 36.8 (12-20-23 @ 18:34)  HR: 70 (12-21-23 @ 04:25) (70 - 89)  BP: 115/64 (12-21-23 @ 04:25) (90/60 - 125/80)  RR: 18 (12-21-23 @ 04:25) (15 - 18)  SpO2: 95% (12-21-23 @ 04:25) (95% - 99%)  I&O's Summary    20 Dec 2023 07:01  -  21 Dec 2023 07:00  --------------------------------------------------------  IN: 0 mL / OUT: 200 mL / NET: -200 mL        PHYSICAL EXAM:  GENERAL: NAD, well-groomed, well-developed  HEAD:  Atraumatic, Normocephalic  EYES: EOMI, PERRLA, conjunctiva and sclera clear  ENMT: No tonsillar erythema, exudates, or enlargement; Moist mucous membranes, Good dentition, No lesions  NECK: Supple, No JVD, Normal thyroid  NERVOUS SYSTEM:  Alert & Oriented X3, Good concentration; Motor Strength 5/5 B/L upper and lower extremities; DTRs 2+ intact and symmetric  CHEST/LUNG: Clear to auscultation bilaterally; No rales, rhonchi, wheezing, or rubs  HEART: Regular rate and rhythm; No murmurs, rubs, or gallops  ABDOMEN: Soft, Nontender, Nondistended; Bowel sounds present  EXTREMITIES:  2+ Peripheral Pulses, No clubbing, cyanosis, or edema  LYMPH: No lymphadenopathy noted  SKIN: No rashes or lesions    LABS:      CAPILLARY BLOOD GLUCOSE          RADIOLOGY & ADDITIONAL TESTS:    MEDICATIONS  (STANDING):  cefTRIAXone   IVPB 1000 milliGRAM(s) IV Intermittent every 24 hours  enoxaparin Injectable 40 milliGRAM(s) SubCutaneous every 24 hours  levothyroxine Injectable 56 MICROGram(s) IV Push at bedtime    MEDICATIONS  (PRN):             *******************************  Usha Reyes MD (PGY-1)  Internal Medicine  Contact via [employer] TEAMS  *******************************    YOUNGER, JONATHAN  68y  Female    Patient is a 68y old  Female who presents with a chief complaint of No PO intake, lethargy (20 Dec 2023 21:57)    Subjective: Seen at bedside this morning. Patient opened eyes to voice. Unable to participate in history taking.     Objective:  T(C): 36.3 (12-21-23 @ 04:25), Max: 36.8 (12-20-23 @ 18:34)  HR: 70 (12-21-23 @ 04:25) (70 - 89)  BP: 115/64 (12-21-23 @ 04:25) (90/60 - 125/80)  RR: 18 (12-21-23 @ 04:25) (15 - 18)  SpO2: 95% (12-21-23 @ 04:25) (95% - 99%)  I&O's Summary    20 Dec 2023 07:01  -  21 Dec 2023 07:00  --------------------------------------------------------  IN: 0 mL / OUT: 200 mL / NET: -200 mL    PHYSICAL EXAM:  GENERAL: NAD, cachectic, chronically ill appearing   HEAD:  Atraumatic, Normocephalic  EYES: EOMI, PERRLA, conjunctiva and sclera clear  ENMT: dry mucous membranes, poor dentition  NECK: Supple, trachea midline  NERVOUS SYSTEM:  Alert & Oriented x0, unable to follow commands   CHEST/LUNG: Clear to auscultation bilaterally; No rales, rhonchi, wheezing, or rubs  HEART: Regular rate and rhythm; No murmurs, rubs, or gallops  ABDOMEN: Soft, Nontender, Nondistended; Bowel sounds present  EXTREMITIES:  contracted, 2+ Peripheral Pulses, No clubbing, cyanosis, or edema  SKIN: No rashes or lesions    LABS:               11.0   7.72  )-----------( 169      ( 21 Dec 2023 10:12 )             36.5     12-21    154<H>  |  125<H>  |  20  ----------------------------<  123<H>  8.5<HH>   |  24  |  0.47<L>    Ca    7.6<L>      21 Dec 2023 14:43  Phos  2.1     12-21  Mg     2.3     12-21    TPro  5.6<L>  /  Alb  2.8<L>  /  TBili  0.6  /  DBili  x   /  AST  12  /  ALT  11  /  AlkPhos  71  12-21        PT/INR - ( 20 Dec 2023 11:54 )   PT: 12.0 sec;   INR: 1.15 ratio    PTT - ( 20 Dec 2023 11:54 )  PTT:30.7 sec    Lactate Trend  12-20 @ 15:24 Lactate:1.3     RADIOLOGY & ADDITIONAL TESTS:    MEDICATIONS  (STANDING):  cefTRIAXone   IVPB 1000 milliGRAM(s) IV Intermittent every 24 hours  enoxaparin Injectable 40 milliGRAM(s) SubCutaneous every 24 hours  levothyroxine Injectable 56 MICROGram(s) IV Push at bedtime    MEDICATIONS  (PRN):

## 2023-12-21 NOTE — DIETITIAN INITIAL EVALUATION ADULT - ORAL INTAKE PTA/DIET HISTORY
Visited pt at bedside on 5TOW. Pt resting,  at bedside able to participate in nutrition evaluation. States that for the past week, pt as not been eating anything. States that prior to that pt had no issues consuming food. Does not follow any specific diet at home. Confirms No known food allergies.  Visited pt at bedside on 5TOW. Pt resting,  at bedside able to participate in nutrition evaluation. States that for the past week, pt as not been eating anything. States that prior to that pt had no issues consuming food. States that pt follows Kosher restrictions. Confirms No known food allergies.

## 2023-12-21 NOTE — DIETITIAN INITIAL EVALUATION ADULT - OTHER CALCULATIONS
Ideal body weight (52.1kg) used for all calculations. Needs adjusted for advanced age, increased needs due to pressure injuries and malnutrition. Fluid recs per team.

## 2023-12-21 NOTE — DIETITIAN INITIAL EVALUATION ADULT - PERTINENT MEDS FT
MEDICATIONS  (STANDING):  cefTRIAXone   IVPB 1000 milliGRAM(s) IV Intermittent every 24 hours  enoxaparin Injectable 40 milliGRAM(s) SubCutaneous every 24 hours  levothyroxine Injectable 56 MICROGram(s) IV Push at bedtime  potassium chloride  10 mEq/100 mL IVPB 10 milliEquivalent(s) IV Intermittent every 1 hour    MEDICATIONS  (PRN):

## 2023-12-21 NOTE — DIETITIAN INITIAL EVALUATION ADULT - OTHER INFO
- Currently ordered for IV Synthroid   - Nutritionally Pertinent Labs  - Currently ordered for IV Synthroid   - Nutritionally Pertinent Labs 12/21 Na: 156 (H), K: 3.1 (L), Cl: 120 (H), BUN: 24 (H), Gluc: 240 (H), Ca: 8.0 (L), Phos: 2.1 (L)  - Hypokalemic; addressed with KCl IV   - Hypophosphatemic; addressed with potassium phosphate IV

## 2023-12-21 NOTE — DIETITIAN INITIAL EVALUATION ADULT - NSFNSGIASSESSMENTFT_GEN_A_CORE
No issues with nausea, vomiting, diarrhea, constipation reported at time of visit. Pt has not had a bowel movement since admission. Pt is not currently on a bowel regimen.

## 2023-12-21 NOTE — DIETITIAN INITIAL EVALUATION ADULT - PERTINENT LABORATORY DATA
12-21    156<H>  |  120<H>  |  24<H>  ----------------------------<  240<H>  3.1<L>   |  26  |  0.54    Ca    8.0<L>      21 Dec 2023 10:19  Phos  2.1     12-21  Mg     2.3     12-21    TPro  5.6<L>  /  Alb  2.8<L>  /  TBili  0.6  /  DBili  x   /  AST  12  /  ALT  11  /  AlkPhos  71  12-21  A1C with Estimated Average Glucose Result: 5.7 % (09-19-23 @ 10:33)

## 2023-12-21 NOTE — PROGRESS NOTE ADULT - ASSESSMENT
68F with PMH significant for breast cancer s/p L mastectomy, TBI (s/p a fall ~2 years ago) c/b aphasia, meningioma, hypothyroidism (hx Grave's dz s/p radioactive iodine), CVA, and dysphagia who presents to [facility] ED on 12/20/2023 with no PO intake and increased lethargy in the last 2-3 days. Found to have significant hypernatremia to 166 (checking q6h), positive UA with borderline leukocytosis with PMN predominance. Pending culture results. RVP and CTH negative. Afebrile, on RA, hypotensive to 90/60 s/p 1L IVF bolus. Physical exam notable for encephalopathy AOx0 and non-verbal, cachexia and b/l upper/lower ext contracture, and multiple pressure ulcers of varying stages (I-III).   68F with PMH significant for breast cancer s/p L mastectomy, TBI (s/p a fall ~2 years ago) c/b aphasia, meningioma, hypothyroidism (hx Grave's dz s/p radioactive iodine), CVA, and dysphagia who presents to [facility] ED on 12/20/2023 with no PO intake and increased lethargy in the last 2-3 days. Found to have significant hypernatremia to 166 (checking q6h), positive UA with borderline leukocytosis with PMN predominance. Pending culture results. RVP and CTH negative. Afebrile, on RA, hypotensive to 90/60 s/p 1L IVF bolus. Physical exam notable for encephalopathy AOx0 and non-verbal, cachexia and b/l upper/lower ext contracture, and multiple pressure ulcers of varying stages (I-III).

## 2023-12-21 NOTE — DIETITIAN INITIAL EVALUATION ADULT - PHYSCIAL ASSESSMENT
Weights:  12/20 89 pounds (dosing)   Per HIE:  10/26 120 pounds   10/13 120 pounds   9/18 110 pounds   3/17 119 pounds     UBW: 115 pounds (per ) x 2-3 months   IBW: 115 pounds     Weight loss noted. RD to continue to monitor weight trends

## 2023-12-21 NOTE — DIETITIAN INITIAL EVALUATION ADULT - EDUCATION DIETARY MODIFICATIONS
Discussed with the  of pt's increased needs when pt is able to tolerate via optimal route. Reviewed good sources of protein. Discussed if EN is warranted, TF will meet all of pt's nutrient needs.  receptive and agreeable to education./(2) meets goals/outcomes/verbalization

## 2023-12-21 NOTE — ADVANCED PRACTICE NURSE CONSULT - RECOMMEDATIONS
Impression:  Regi rectal incontinence dermatitis   Left trochanter unstageable pressure injury, present on admission.    left deltoid ( shoulder ) unstageable pressure injury, present on admission.   Right trochanter (hip)  unstageable pressure injury, present on admission  right buttock unstageable pressure injury, present on admission.  bilateral sacrum / buttocks unstageable pressure injury, present on admission.  thoracic spine deep tissue injury with evolution present on admission .   right lateral malleolus deep tissue injury with evolution present on admission .   Right heel deep tissue injury present on admission.   left heel deep tissue injury present on admission.    Impression:  Angel rectal incontinence dermatitis   Left trochanter unstageable pressure injury, present on admission.    left deltoid ( shoulder ) unstageable pressure injury, present on admission.   Right trochanter (hip)  unstageable pressure injury, present on admission  right buttock unstageable pressure injury, present on admission.  bilateral sacrum / buttocks unstageable pressure injury, present on admission.  thoracic spine deep tissue injury with evolution present on admission .   right lateral malleolus deep tissue injury with evolution present on admission .   Right heel deep tissue injury present on admission.   left heel deep tissue injury present on admission.       cleanse w/NS, pat dry, & apply 3M No Sting Cavilon liquid barrier film wipe to angel wound , to the wound base  apply Medihoney paste to yellow slough cover with Allevyn ( foam) change daily and when soiled  monitor for changes   Impression:  Angel rectal incontinence dermatitis   Left trochanter unstageable pressure injury, present on admission.    left deltoid ( shoulder ) unstageable pressure injury, present on admission.   Right trochanter (hip)  unstageable pressure injury, present on admission  right buttock unstageable pressure injury, present on admission.  bilateral sacrum / buttocks unstageable pressure injury, present on admission.  thoracic spine deep tissue injury with evolution present on admission .   right lateral malleolus deep tissue injury with evolution present on admission .   Right heel deep tissue injury present on admission.   left heel deep tissue injury present on admission.   Recommendations:    1. Left trochanter unstageable pressure injury,      left deltoid ( shoulder ) unstageable pressure injury,.      Right trochanter (hip)  unstageable pressure injury,      right buttock unstageable pressure injury,   cleanse w/NS, pat dry, & apply 3M No Sting Cavilon liquid barrier film wipe to angel wound , to the wound base  apply Medihoney paste to yellow slough cover with Allevyn ( foam) change daily and when soiled  monitor for changes  2. 1. Bilateral  , sacral /buttocks  unstageable          angel rectal incontinence dermatitis    Topical therapy- sacral/bilateral buttocks- cleanse w/incontinent cleanser, pat dry & apply traid  paste   twice daily & prn soiling .  Monitor  for changes .  3. Right and left heel  deep tissue injury.     right lateral malleolus deep tissue injury.  ( A).  cleans  with normal saline pat dry and apply no sting barrier film ( Cavilon ) daily and monitor for changes .  (B)Elevate heels; apply Complete Cair air fluidized boots; ensure that the soles of the feet are not resting on the foot board of the bed.  (c) podiatry to consult follow there management   4.  Incontinent management - incontinent cleanser, pads,  angel care  BID  5. Maintain on an alternating air with low air loss surface   6. Turn & reposition every 2 hr; Use positioning pillow to turn and reposition, soft pillow between bony prominences; continue measures to decrease friction/shear/pressure.  7. Nutrition optimization.  8. Place  waffle cushion when out of bed to chair .   Plan of care was reviewed with covering RN    Impression:  Angel rectal incontinence dermatitis   Left trochanter unstageable pressure injury, present on admission.    left deltoid ( shoulder ) unstageable pressure injury, present on admission.   Right trochanter (hip)  unstageable pressure injury, present on admission  right buttock unstageable pressure injury, present on admission.  bilateral sacrum / buttocks unstageable pressure injury, present on admission.  thoracic spine deep tissue injury with evolution present on admission .   right lateral malleolus deep tissue injury with evolution present on admission .   Right heel deep tissue injury present on admission.   left heel deep tissue injury present on admission.   Recommendations:    1. Left trochanter unstageable pressure injury,      left deltoid ( shoulder ) unstageable pressure injury,.      Right trochanter (hip)  unstageable pressure injury,      right buttock unstageable pressure injury,   cleanse w/NS, pat dry, & apply 3M No Sting Cavilon liquid barrier film wipe to angel wound , to the wound base  apply Medihoney paste to yellow slough cover with Allevyn ( foam) change daily and when soiled  monitor for changes  2. 1. Bilateral  , sacral /buttocks  unstageable          angel rectal incontinence dermatitis    Topical therapy- sacral/bilateral buttocks- cleanse w/incontinent cleanser, pat dry & apply traid  paste   twice daily & prn soiling .  Monitor  for changes .  3. Right and left heel  deep tissue injury.     right lateral malleolus deep tissue injury.  ( A).  cleans  with normal saline pat dry and apply no sting barrier film ( Cavilon ) daily and monitor for changes .  (B)Elevate heels; apply Complete Cair air fluidized boots; ensure that the soles of the feet are not resting on the foot board of the bed.  (c) podiatry to consult follow there management   4.  Incontinent management - incontinent cleanser, pads,  angel care  BID  5. Maintain on an alternating air with low air loss surface   6. Turn & reposition every 2 hr; Use positioning pillow to turn and reposition, soft pillow between bony prominences; continue measures to decrease friction/shear/pressure.  7. Nutrition optimization.  8. Place  waffle cushion when out of bed to chair .   9. thoracic spine deep tissue injury with evolution cleans  with normal saline pat dry and apply no sting barrier film ( Cavilon ) cover with Alleveyn gentle border daily and monitor for changes.  Plan of care was reviewed with covering RN

## 2023-12-21 NOTE — DIETITIAN INITIAL EVALUATION ADULT - ADD RECOMMEND
1. When medically appropriate if PO diet is warranted recommend diet with no therapeutic restrictions as tolerated. Fluids deferred to medical team. Diet texture/fluid consistencies based on SLP recommendations.   2. If EN is warranted recommend Jevity 1.2 @GOAL rate 55ml/hr x 24 hours. Provides 1320 ml total volume, 1584kcal, 73.2g pro. Meets 30kcal/kg, 1.4g pro/kg based on IBW 52.1kg. Fluids deferred to team.   >>>>REFEEDING RISK; start at 10ml/hr, increase 10 ml/hr ever 6 hours until goal or as tolerated. Trend K+, Mg, Phos, replete as needed.   2. Recommend micronutrient supplementations: multivitamin, thiamin, vitamin c, zinc x 10 days   3. Monitor tolerance, weight trends, labs, and skin integrity and bowel movement regularity.   4. RD remains available upon request and will follow-up per protocol.   5. Malnutrition sticker placed in charts

## 2023-12-21 NOTE — DIETITIAN INITIAL EVALUATION ADULT - ETIOLOGY
increased physiological demand for nutrients  inadequate PO intake to meet increased physiological demand for nutrients

## 2023-12-21 NOTE — PHYSICAL THERAPY INITIAL EVALUATION ADULT - SPECIFY REASON(S)
Pt seems to be at functional baseline, requiring total assist for ADL's and any functional mobility.

## 2023-12-21 NOTE — DIETITIAN INITIAL EVALUATION ADULT - NSFNSNUTRCHEWSWALLOWFT_GEN_A_CORE
States that she has difficulty chewing/swallowing. Per team, when mental status appropriate SLP to evaluate

## 2023-12-22 ENCOUNTER — TRANSCRIPTION ENCOUNTER (OUTPATIENT)
Age: 68
End: 2023-12-22

## 2023-12-22 LAB
ANION GAP SERPL CALC-SCNC: 10 MMOL/L — SIGNIFICANT CHANGE UP (ref 5–17)
ANION GAP SERPL CALC-SCNC: 10 MMOL/L — SIGNIFICANT CHANGE UP (ref 5–17)
ANION GAP SERPL CALC-SCNC: 15 MMOL/L — SIGNIFICANT CHANGE UP (ref 5–17)
ANION GAP SERPL CALC-SCNC: 15 MMOL/L — SIGNIFICANT CHANGE UP (ref 5–17)
ANION GAP SERPL CALC-SCNC: 9 MMOL/L — SIGNIFICANT CHANGE UP (ref 5–17)
BASOPHILS # BLD AUTO: 0.01 K/UL — SIGNIFICANT CHANGE UP (ref 0–0.2)
BASOPHILS # BLD AUTO: 0.01 K/UL — SIGNIFICANT CHANGE UP (ref 0–0.2)
BASOPHILS NFR BLD AUTO: 0.1 % — SIGNIFICANT CHANGE UP (ref 0–2)
BASOPHILS NFR BLD AUTO: 0.1 % — SIGNIFICANT CHANGE UP (ref 0–2)
BUN SERPL-MCNC: 12 MG/DL — SIGNIFICANT CHANGE UP (ref 7–23)
BUN SERPL-MCNC: 12 MG/DL — SIGNIFICANT CHANGE UP (ref 7–23)
BUN SERPL-MCNC: 13 MG/DL — SIGNIFICANT CHANGE UP (ref 7–23)
BUN SERPL-MCNC: 13 MG/DL — SIGNIFICANT CHANGE UP (ref 7–23)
BUN SERPL-MCNC: 16 MG/DL — SIGNIFICANT CHANGE UP (ref 7–23)
CALCIUM SERPL-MCNC: 7.3 MG/DL — LOW (ref 8.4–10.5)
CALCIUM SERPL-MCNC: 7.3 MG/DL — LOW (ref 8.4–10.5)
CALCIUM SERPL-MCNC: 8.1 MG/DL — LOW (ref 8.4–10.5)
CALCIUM SERPL-MCNC: 8.2 MG/DL — LOW (ref 8.4–10.5)
CALCIUM SERPL-MCNC: 8.2 MG/DL — LOW (ref 8.4–10.5)
CHLORIDE SERPL-SCNC: 100 MMOL/L — SIGNIFICANT CHANGE UP (ref 96–108)
CHLORIDE SERPL-SCNC: 100 MMOL/L — SIGNIFICANT CHANGE UP (ref 96–108)
CHLORIDE SERPL-SCNC: 118 MMOL/L — HIGH (ref 96–108)
CHLORIDE SERPL-SCNC: 118 MMOL/L — HIGH (ref 96–108)
CHLORIDE SERPL-SCNC: 122 MMOL/L — HIGH (ref 96–108)
CHLORIDE SERPL-SCNC: 122 MMOL/L — HIGH (ref 96–108)
CHLORIDE SERPL-SCNC: 123 MMOL/L — HIGH (ref 96–108)
CHLORIDE SERPL-SCNC: 123 MMOL/L — HIGH (ref 96–108)
CO2 SERPL-SCNC: 23 MMOL/L — SIGNIFICANT CHANGE UP (ref 22–31)
CO2 SERPL-SCNC: 25 MMOL/L — SIGNIFICANT CHANGE UP (ref 22–31)
CO2 SERPL-SCNC: 25 MMOL/L — SIGNIFICANT CHANGE UP (ref 22–31)
CO2 SERPL-SCNC: 26 MMOL/L — SIGNIFICANT CHANGE UP (ref 22–31)
CO2 SERPL-SCNC: 26 MMOL/L — SIGNIFICANT CHANGE UP (ref 22–31)
CREAT SERPL-MCNC: 0.4 MG/DL — LOW (ref 0.5–1.3)
CREAT SERPL-MCNC: 0.4 MG/DL — LOW (ref 0.5–1.3)
CREAT SERPL-MCNC: 0.46 MG/DL — LOW (ref 0.5–1.3)
CREAT SERPL-MCNC: 0.46 MG/DL — LOW (ref 0.5–1.3)
CREAT SERPL-MCNC: 0.51 MG/DL — SIGNIFICANT CHANGE UP (ref 0.5–1.3)
CREAT SERPL-MCNC: 0.51 MG/DL — SIGNIFICANT CHANGE UP (ref 0.5–1.3)
CREAT SERPL-MCNC: 0.52 MG/DL — SIGNIFICANT CHANGE UP (ref 0.5–1.3)
CREAT SERPL-MCNC: 0.52 MG/DL — SIGNIFICANT CHANGE UP (ref 0.5–1.3)
EGFR: 100 ML/MIN/1.73M2 — SIGNIFICANT CHANGE UP
EGFR: 103 ML/MIN/1.73M2 — SIGNIFICANT CHANGE UP
EGFR: 103 ML/MIN/1.73M2 — SIGNIFICANT CHANGE UP
EGFR: 106 ML/MIN/1.73M2 — SIGNIFICANT CHANGE UP
EGFR: 106 ML/MIN/1.73M2 — SIGNIFICANT CHANGE UP
EOSINOPHIL # BLD AUTO: 0.03 K/UL — SIGNIFICANT CHANGE UP (ref 0–0.5)
EOSINOPHIL # BLD AUTO: 0.03 K/UL — SIGNIFICANT CHANGE UP (ref 0–0.5)
EOSINOPHIL NFR BLD AUTO: 0.3 % — SIGNIFICANT CHANGE UP (ref 0–6)
EOSINOPHIL NFR BLD AUTO: 0.3 % — SIGNIFICANT CHANGE UP (ref 0–6)
GLUCOSE BLDC GLUCOMTR-MCNC: 137 MG/DL — HIGH (ref 70–99)
GLUCOSE BLDC GLUCOMTR-MCNC: 137 MG/DL — HIGH (ref 70–99)
GLUCOSE BLDC GLUCOMTR-MCNC: 85 MG/DL — SIGNIFICANT CHANGE UP (ref 70–99)
GLUCOSE BLDC GLUCOMTR-MCNC: 85 MG/DL — SIGNIFICANT CHANGE UP (ref 70–99)
GLUCOSE SERPL-MCNC: 110 MG/DL — HIGH (ref 70–99)
GLUCOSE SERPL-MCNC: 110 MG/DL — HIGH (ref 70–99)
GLUCOSE SERPL-MCNC: 125 MG/DL — HIGH (ref 70–99)
GLUCOSE SERPL-MCNC: 125 MG/DL — HIGH (ref 70–99)
GLUCOSE SERPL-MCNC: 729 MG/DL — CRITICAL HIGH (ref 70–99)
GLUCOSE SERPL-MCNC: 729 MG/DL — CRITICAL HIGH (ref 70–99)
GLUCOSE SERPL-MCNC: 81 MG/DL — SIGNIFICANT CHANGE UP (ref 70–99)
GLUCOSE SERPL-MCNC: 81 MG/DL — SIGNIFICANT CHANGE UP (ref 70–99)
HCT VFR BLD CALC: 37.3 % — SIGNIFICANT CHANGE UP (ref 34.5–45)
HCT VFR BLD CALC: 37.3 % — SIGNIFICANT CHANGE UP (ref 34.5–45)
HGB BLD-MCNC: 11.3 G/DL — LOW (ref 11.5–15.5)
HGB BLD-MCNC: 11.3 G/DL — LOW (ref 11.5–15.5)
IMM GRANULOCYTES NFR BLD AUTO: 0.4 % — SIGNIFICANT CHANGE UP (ref 0–0.9)
IMM GRANULOCYTES NFR BLD AUTO: 0.4 % — SIGNIFICANT CHANGE UP (ref 0–0.9)
LYMPHOCYTES # BLD AUTO: 1.53 K/UL — SIGNIFICANT CHANGE UP (ref 1–3.3)
LYMPHOCYTES # BLD AUTO: 1.53 K/UL — SIGNIFICANT CHANGE UP (ref 1–3.3)
LYMPHOCYTES # BLD AUTO: 17 % — SIGNIFICANT CHANGE UP (ref 13–44)
LYMPHOCYTES # BLD AUTO: 17 % — SIGNIFICANT CHANGE UP (ref 13–44)
MAGNESIUM SERPL-MCNC: 2.2 MG/DL — SIGNIFICANT CHANGE UP (ref 1.6–2.6)
MAGNESIUM SERPL-MCNC: 2.2 MG/DL — SIGNIFICANT CHANGE UP (ref 1.6–2.6)
MCHC RBC-ENTMCNC: 27.3 PG — SIGNIFICANT CHANGE UP (ref 27–34)
MCHC RBC-ENTMCNC: 27.3 PG — SIGNIFICANT CHANGE UP (ref 27–34)
MCHC RBC-ENTMCNC: 30.3 GM/DL — LOW (ref 32–36)
MCHC RBC-ENTMCNC: 30.3 GM/DL — LOW (ref 32–36)
MCV RBC AUTO: 90.1 FL — SIGNIFICANT CHANGE UP (ref 80–100)
MCV RBC AUTO: 90.1 FL — SIGNIFICANT CHANGE UP (ref 80–100)
MONOCYTES # BLD AUTO: 0.37 K/UL — SIGNIFICANT CHANGE UP (ref 0–0.9)
MONOCYTES # BLD AUTO: 0.37 K/UL — SIGNIFICANT CHANGE UP (ref 0–0.9)
MONOCYTES NFR BLD AUTO: 4.1 % — SIGNIFICANT CHANGE UP (ref 2–14)
MONOCYTES NFR BLD AUTO: 4.1 % — SIGNIFICANT CHANGE UP (ref 2–14)
NEUTROPHILS # BLD AUTO: 7.04 K/UL — SIGNIFICANT CHANGE UP (ref 1.8–7.4)
NEUTROPHILS # BLD AUTO: 7.04 K/UL — SIGNIFICANT CHANGE UP (ref 1.8–7.4)
NEUTROPHILS NFR BLD AUTO: 78.1 % — HIGH (ref 43–77)
NEUTROPHILS NFR BLD AUTO: 78.1 % — HIGH (ref 43–77)
NRBC # BLD: 0 /100 WBCS — SIGNIFICANT CHANGE UP (ref 0–0)
NRBC # BLD: 0 /100 WBCS — SIGNIFICANT CHANGE UP (ref 0–0)
PHOSPHATE SERPL-MCNC: 2 MG/DL — LOW (ref 2.5–4.5)
PHOSPHATE SERPL-MCNC: 2 MG/DL — LOW (ref 2.5–4.5)
PLATELET # BLD AUTO: 146 K/UL — LOW (ref 150–400)
PLATELET # BLD AUTO: 146 K/UL — LOW (ref 150–400)
POTASSIUM SERPL-MCNC: 3.5 MMOL/L — SIGNIFICANT CHANGE UP (ref 3.5–5.3)
POTASSIUM SERPL-MCNC: 3.7 MMOL/L — SIGNIFICANT CHANGE UP (ref 3.5–5.3)
POTASSIUM SERPL-MCNC: 3.7 MMOL/L — SIGNIFICANT CHANGE UP (ref 3.5–5.3)
POTASSIUM SERPL-MCNC: 3.9 MMOL/L — SIGNIFICANT CHANGE UP (ref 3.5–5.3)
POTASSIUM SERPL-MCNC: 3.9 MMOL/L — SIGNIFICANT CHANGE UP (ref 3.5–5.3)
POTASSIUM SERPL-SCNC: 3.5 MMOL/L — SIGNIFICANT CHANGE UP (ref 3.5–5.3)
POTASSIUM SERPL-SCNC: 3.7 MMOL/L — SIGNIFICANT CHANGE UP (ref 3.5–5.3)
POTASSIUM SERPL-SCNC: 3.7 MMOL/L — SIGNIFICANT CHANGE UP (ref 3.5–5.3)
POTASSIUM SERPL-SCNC: 3.9 MMOL/L — SIGNIFICANT CHANGE UP (ref 3.5–5.3)
POTASSIUM SERPL-SCNC: 3.9 MMOL/L — SIGNIFICANT CHANGE UP (ref 3.5–5.3)
RBC # BLD: 4.14 M/UL — SIGNIFICANT CHANGE UP (ref 3.8–5.2)
RBC # BLD: 4.14 M/UL — SIGNIFICANT CHANGE UP (ref 3.8–5.2)
RBC # FLD: 14.8 % — HIGH (ref 10.3–14.5)
RBC # FLD: 14.8 % — HIGH (ref 10.3–14.5)
SODIUM SERPL-SCNC: 132 MMOL/L — LOW (ref 135–145)
SODIUM SERPL-SCNC: 132 MMOL/L — LOW (ref 135–145)
SODIUM SERPL-SCNC: 153 MMOL/L — HIGH (ref 135–145)
SODIUM SERPL-SCNC: 153 MMOL/L — HIGH (ref 135–145)
SODIUM SERPL-SCNC: 157 MMOL/L — HIGH (ref 135–145)
SODIUM SERPL-SCNC: 157 MMOL/L — HIGH (ref 135–145)
SODIUM SERPL-SCNC: 161 MMOL/L — CRITICAL HIGH (ref 135–145)
SODIUM SERPL-SCNC: 161 MMOL/L — CRITICAL HIGH (ref 135–145)
WBC # BLD: 9.02 K/UL — SIGNIFICANT CHANGE UP (ref 3.8–10.5)
WBC # BLD: 9.02 K/UL — SIGNIFICANT CHANGE UP (ref 3.8–10.5)
WBC # FLD AUTO: 9.02 K/UL — SIGNIFICANT CHANGE UP (ref 3.8–10.5)
WBC # FLD AUTO: 9.02 K/UL — SIGNIFICANT CHANGE UP (ref 3.8–10.5)

## 2023-12-22 PROCEDURE — 99222 1ST HOSP IP/OBS MODERATE 55: CPT

## 2023-12-22 PROCEDURE — 99232 SBSQ HOSP IP/OBS MODERATE 35: CPT | Mod: GC

## 2023-12-22 PROCEDURE — 71045 X-RAY EXAM CHEST 1 VIEW: CPT | Mod: 26

## 2023-12-22 RX ORDER — SODIUM CHLORIDE 9 MG/ML
1000 INJECTION, SOLUTION INTRAVENOUS
Refills: 0 | Status: COMPLETED | OUTPATIENT
Start: 2023-12-22 | End: 2023-12-22

## 2023-12-22 RX ORDER — SODIUM CHLORIDE 9 MG/ML
500 INJECTION INTRAMUSCULAR; INTRAVENOUS; SUBCUTANEOUS
Refills: 0 | Status: DISCONTINUED | OUTPATIENT
Start: 2023-12-22 | End: 2023-12-22

## 2023-12-22 RX ADMIN — Medication 56 MICROGRAM(S): at 21:29

## 2023-12-22 RX ADMIN — CEFTRIAXONE 100 MILLIGRAM(S): 500 INJECTION, POWDER, FOR SOLUTION INTRAMUSCULAR; INTRAVENOUS at 13:12

## 2023-12-22 RX ADMIN — ENOXAPARIN SODIUM 40 MILLIGRAM(S): 100 INJECTION SUBCUTANEOUS at 04:58

## 2023-12-22 RX ADMIN — SODIUM CHLORIDE 50 MILLILITER(S): 9 INJECTION INTRAMUSCULAR; INTRAVENOUS; SUBCUTANEOUS at 05:03

## 2023-12-22 RX ADMIN — SODIUM CHLORIDE 50 MILLILITER(S): 9 INJECTION, SOLUTION INTRAVENOUS at 11:43

## 2023-12-22 NOTE — PROGRESS NOTE ADULT - SUBJECTIVE AND OBJECTIVE BOX
*******************************  Usha Reyes MD (PGY-1)  Internal Medicine  Contact via [employer] TEAMS  *******************************    YOUNGER, JONATHAN  68y  Female    Patient is a 68y old  Female who presents with a chief complaint of Hyperosmolality with hypernatremia     (21 Dec 2023 14:41)      Subjective:    Objective:  T(C): 36.4 (12-22-23 @ 04:29), Max: 37.2 (12-21-23 @ 23:58)  HR: 78 (12-22-23 @ 04:29) (67 - 78)  BP: 111/70 (12-22-23 @ 04:29) (105/71 - 117/73)  RR: 18 (12-22-23 @ 04:29) (18 - 19)  SpO2: 93% (12-22-23 @ 04:29) (90% - 100%)  I&O's Summary    21 Dec 2023 07:01  -  22 Dec 2023 07:00  --------------------------------------------------------  IN: 300 mL / OUT: 0 mL / NET: 300 mL        PHYSICAL EXAM:  GENERAL: NAD, well-groomed, well-developed  HEAD:  Atraumatic, Normocephalic  EYES: EOMI, PERRLA, conjunctiva and sclera clear  ENMT: No tonsillar erythema, exudates, or enlargement; Moist mucous membranes, Good dentition, No lesions  NECK: Supple, No JVD, Normal thyroid  NERVOUS SYSTEM:  Alert & Oriented X3, Good concentration; Motor Strength 5/5 B/L upper and lower extremities; DTRs 2+ intact and symmetric  CHEST/LUNG: Clear to auscultation bilaterally; No rales, rhonchi, wheezing, or rubs  HEART: Regular rate and rhythm; No murmurs, rubs, or gallops  ABDOMEN: Soft, Nontender, Nondistended; Bowel sounds present  EXTREMITIES:  2+ Peripheral Pulses, No clubbing, cyanosis, or edema  LYMPH: No lymphadenopathy noted  SKIN: No rashes or lesions    LABS:      CAPILLARY BLOOD GLUCOSE          RADIOLOGY & ADDITIONAL TESTS:    MEDICATIONS  (STANDING):  cefTRIAXone   IVPB 1000 milliGRAM(s) IV Intermittent every 24 hours  enoxaparin Injectable 40 milliGRAM(s) SubCutaneous every 24 hours  levothyroxine Injectable 56 MICROGram(s) IV Push at bedtime  sodium chloride 0.225%. 500 milliLiter(s) (50 mL/Hr) IV Continuous <Continuous>    MEDICATIONS  (PRN):             *******************************  Usha Reyes MD (PGY-1)  Internal Medicine  Contact via [employer] TEAMS  *******************************    YOUNGER, JONATHAN  68y  Female    Patient is a 68y old  Female who presents with a chief complaint of Hyperosmolality with hypernatremia     (21 Dec 2023 14:41)    Subjective: No acute events overnight. Seen at bedside. Patient w/ eyes open but unable to answer questions and follow commands appropriately.     Objective:  T(C): 36.4 (12-22-23 @ 04:29), Max: 37.2 (12-21-23 @ 23:58)  HR: 78 (12-22-23 @ 04:29) (67 - 78)  BP: 111/70 (12-22-23 @ 04:29) (105/71 - 117/73)  RR: 18 (12-22-23 @ 04:29) (18 - 19)  SpO2: 93% (12-22-23 @ 04:29) (90% - 100%)  I&O's Summary    21 Dec 2023 07:01  -  22 Dec 2023 07:00  --------------------------------------------------------  IN: 300 mL / OUT: 0 mL / NET: 300 mL    PHYSICAL EXAM:  GENERAL: NAD, cachetic, chronically ill-appearing  HEAD:  Atraumatic, Normocephalic  EYES: EOMI, PERRLA, conjunctiva and sclera clear  ENMT: dry mucous membranes  NECK: Supple  NERVOUS SYSTEM:  Alert & Oriented X0, does not track or respond to voice, unable to follow commands   CHEST/LUNG: Clear to auscultation bilaterally; No rales, rhonchi, wheezing, or rubs  HEART: Regular rate and rhythm; No murmurs, rubs, or gallops  ABDOMEN: Soft, Nontender, Nondistended; Bowel sounds present  EXTREMITIES: contracted   LYMPH: No lymphadenopathy noted  SKIN: No rashes or lesions    LABS:             11.3   9.02  )-----------( 146      ( 22 Dec 2023 07:09 )             37.3     12-22    161<HH>  |  123<H>  |  16  ----------------------------<  81  3.7   |  23  |  0.52    Ca    8.1<L>      22 Dec 2023 07:08  Phos  2.0     12-22  Mg     2.2     12-22    TPro  5.6<L>  /  Alb  2.8<L>  /  TBili  0.6  /  DBili  x   /  AST  12  /  ALT  11  /  AlkPhos  71  12-21    Lactate Trend  12-20 @ 15:24 Lactate:1.3     CAPILLARY BLOOD GLUCOSE  POCT Blood Glucose.: 85 mg/dL (22 Dec 2023 11:57)    Culture Results:   >100,000 CFU/ml Escherichia coli (12-20 @ 11:54)  Culture Results:   No growth at 24 hours (12-20 @ 11:30)  Culture Results:   No growth at 24 hours (12-20 @ 11:15)    RADIOLOGY & ADDITIONAL TESTS:    MEDICATIONS  (STANDING):  cefTRIAXone   IVPB 1000 milliGRAM(s) IV Intermittent every 24 hours  enoxaparin Injectable 40 milliGRAM(s) SubCutaneous every 24 hours  levothyroxine Injectable 56 MICROGram(s) IV Push at bedtime  sodium chloride 0.225%. 500 milliLiter(s) (50 mL/Hr) IV Continuous <Continuous>    MEDICATIONS  (PRN):

## 2023-12-22 NOTE — PROGRESS NOTE ADULT - PROBLEM SELECTOR PLAN 2
Symptoms of lethargy/encephalopathy >48h with no PO intake, likely has chronic hypernatremia (vs acute) 2/2 severe dehydration. Na 166->156. at goal for   - Free water deficit 3.8L  - Target lowering the serum Na by 10 mEq/L in 24h  - Start IV D5W at 1.35 ml/kg/h (start at 60 ml/h)  - Trend serum Na q6h; once the target  rate of correction attained, liberalize to [id number] until normonatremia attained Symptoms of lethargy/encephalopathy >48h with no PO intake, likely has chronic hypernatremia (vs acute) 2/2 severe dehydration. Na 166->156. at goal for   - Free water deficit 3.8L  - Target lowering the serum Na by 10 mEq/L in 24h  - Start IV D5W at 1.35 ml/kg/h (start at 60 ml/h)  - Trend serum Na q6h; once the target  rate of correction attained, liberalize to [id number] until normonatremia attained Symptoms of lethargy/encephalopathy >48h with no PO intake, likely has chronic hypernatremia (vs acute) 2/2 severe dehydration. Na 166->156. at goal, then transitioned to 1/4 NS 50cc/hr. repeat Na at midnight 157, continued fluids. morning Na 161.   - Free water deficit 2.4L  - Target lowering the serum Na by 10 mEq/L in 24h  - Start IV D5W at 50cc/hr  - will repeat BMP at 3pm   - Trend serum Na q6h; once the target  rate of correction attained, liberalize to [id number] until normonatremia attained Symptoms of lethargy/encephalopathy >48h with no PO intake, likely has chronic hypernatremia (vs acute) 2/2 severe dehydration. Na 166->156. at goal, then transitioned to 1/4 NS 50cc/hr. repeat Na at midnight 157, continued fluids. morning Na 161.   - Free water deficit 2.4L  - Target lowering the serum Na by 10 mEq/L in 24h  - Start IV D5W at 50cc/hr  - will repeat BMP at 3pm   - Trend serum Na q6h; once the target  rate of correction attained, liberalize to [id number] until normonatremia attained

## 2023-12-22 NOTE — PROGRESS NOTE ADULT - ASSESSMENT
68F with PMH significant for breast cancer s/p L mastectomy, TBI (s/p a fall ~2 years ago) c/b aphasia, meningioma, hypothyroidism (hx Grave's dz s/p radioactive iodine), CVA, and dysphagia who presents to [facility] ED on 12/20/2023 with no PO intake and increased lethargy in the last 2-3 days. Found to have significant hypernatremia to 166 (checking q6h), positive UA with borderline leukocytosis with PMN predominance. Pending culture results. RVP and CTH negative. Afebrile, on RA, hypotensive to 90/60 s/p 1L IVF bolus. Physical exam notable for encephalopathy AOx0 and non-verbal, cachexia and b/l upper/lower ext contracture, and multiple pressure ulcers of varying stages (I-III).   68F with PMH significant for breast cancer s/p L mastectomy, TBI (s/p a fall ~2 years ago) c/b aphasia, meningioma, hypothyroidism (hx Grave's dz s/p radioactive iodine), CVA, and dysphagia who presents to [facility] ED on 12/20/2023 with no PO intake and increased lethargy in the last 2-3 days. Found to have significant hypernatremia to 166 (checking q6h), positive UA with borderline leukocytosis with PMN predominance. Pending culture results. RVP and CTH negative. Afebrile, on RA, hypotensive to 90/60 s/p 1L IVF bolus. Physical exam notable for encephalopathy AOx0 and non-verbal, cachexia and b/l upper/lower ext contracture, and multiple pressure ulcers of varying stages (I-III).

## 2023-12-22 NOTE — DISCHARGE NOTE PROVIDER - CARE PROVIDER_API CALL
Silvina Herrera  Internal Medicine  7 [facility], [address] [address]  Phone: [phone]  Fax: [phone]  Established Patient  Follow Up Time: 2 weeks   Silvina Herrera  Internal Medicine  7 [facility], [address] [address]  Phone: [phone]  Fax: [phone]  Established Patient  Follow Up Time: 2 weeks   Silvina Herrera  Internal Medicine  7 [facility], [address] [address]  Phone: [phone]  Fax: [phone]  Established Patient  Follow Up Time: 2 weeks

## 2023-12-22 NOTE — DISCHARGE NOTE PROVIDER - NSDCFUSCHEDAPPT_GEN_ALL_CORE_FT
[facility] Physician [facility]  WOUNDCARE [address]  Scheduled Appointment: 01/05/2024     [facility] Physician [facility]  WOUNDCARE [address]  Scheduled Appointment: 01/05/2024     [facility] Physician [facility]  WOUNDCARE [address]  Scheduled Appointment: 01/05/2024

## 2023-12-22 NOTE — PROGRESS NOTE ADULT - PROBLEM SELECTOR PLAN 3
Hx of dysphagia since 2-3 months ago per son  - Notable for severe b/l ext contracture and cachexia  - Low PO intake at baseline, no PO intake in the last 2-3 days  - Continue mIVF (D5W for hypernatremia)  - Order speech and swallow evaluation once mental status appropriate  - Discuss options for possible tube feeding with family ( is HCP) Hx of dysphagia since 2-3 months ago per son  - Notable for severe b/l ext contracture and cachexia  - Low PO intake at baseline, no PO intake in the last 2-3 days  - Continue mIVF (D5W for hypernatremia)  - Order speech and swallow evaluation once mental status appropriate  - need GOC, Discuss options for possible tube feeding with family ( is HCP)

## 2023-12-22 NOTE — DISCHARGE NOTE PROVIDER - HOSPITAL COURSE
HPI:  68F with PMH significant for breast cancer s/p L mastectomy, TBI (s/p a fall ~2 years ago) c/b aphasia, meningioma, hypothyroidism (hx Grave's dz s/p radioactive iodine), CVA, and dysphagia who presents to [facility] ED on 12/20/2023 with no PO intake and increased lethargy in the last 2-3 days.    On encounter, pt is unable to communicate verbally, only opens eyes to voice. Unable to maintain attention or follow commands at this time. Tried contacting  (HCP), unable to reach at this time; called the son (Henok) to obtain collateral HPI. Per son, pt was verbal up to 2-3 days ago with prompting, usually able to answer Y/N questions or say her name. Pt was also able to tolerate thick liquids such as sauce. Approx 2-3 days ago, pt started not tolerating any PO intake, became significantly more lethargic at home, and was brought to the ED. Pt lives at home with , and is able to walk with a walker and a personal assistance. Reported to have relative left-sided weakness since the fall that led to TBI. Unable to assess ROS currently d/t encephalopathy/lethargy.    Vital signs stable with mild hypotension (T 36.6C, HR 78, BP 90/60, on RA, SpO2 >96%). Initial workup in the ED notable for borderline leukocytosis 10.87 with PMN predominance, significant hypernatremia 160 -> 166 (checking q6h), elevated procal 0.12, and positive UA (dark yellow, cloudy, +ketone, nitrite, 7 WBC, many bacteria). CXR with enlarged cardiomediastinal silhouette and L base atelectasis; no focal consolidations, pleural effusion, or pneumothorax. Lactate 1.3, negative RVP, and CTH negative for acute intracranial pathology (redemonstration of parenchymal volume loss + chronic microvascular changes).    Pt was given empiric IV CTX 1 g x1 and NS bolus 1L in the ED. Blood and urine cultures have been sent. (20 Dec 2023 21:57)    Hospital Course:      On day of discharge, patient is clinically stable with no new exam findings or acute symptoms compared to prior. The patient was seen by the attending physician on the date of discharge and deemed stable and acceptable for discharge. The patient's chronic medical conditions were treated accordingly per the patient's home medication regimen. The patient's medication reconciliation, follow up appointments, discharge instructions, and significant lab and diagnostic studies are as noted.   Important Medication Changes and Reason:    Active or Pending Issues Requiring Follow-up:    Advanced Directives:   [ ] Full code  [ ] DNR  [ ] Hospice    Discharge Diagnoses:         HPI:  68F with PMH significant for breast cancer s/p L mastectomy, TBI (s/p a fall ~2 years ago) c/b aphasia, meningioma, hypothyroidism (hx Grave's dz s/p radioactive iodine), CVA, and dysphagia who presents to [facility] ED on 12/20/2023 with no PO intake and increased lethargy in the last 2-3 days.    On encounter, pt is unable to communicate verbally, only opens eyes to voice. Unable to maintain attention or follow commands at this time. Tried contacting  (HCP), unable to reach at this time; called the son (Henok) to obtain collateral HPI. Per son, pt was verbal up to 2-3 days ago with prompting, usually able to answer Y/N questions or say her name. Pt was also able to tolerate thick liquids such as sauce. Approx 2-3 days ago, pt started not tolerating any PO intake, became significantly more lethargic at home, and was brought to the ED. Pt lives at home with , and is able to walk with a walker and a personal assistance. Reported to have relative left-sided weakness since the fall that led to TBI. Unable to assess ROS currently d/t encephalopathy/lethargy.    Vital signs stable with mild hypotension (T 36.6C, HR 78, BP 90/60, on RA, SpO2 >96%). Initial workup in the ED notable for borderline leukocytosis 10.87 with PMN predominance, significant hypernatremia 160 -> 166 (checking q6h), elevated procal 0.12, and positive UA (dark yellow, cloudy, +ketone, nitrite, 7 WBC, many bacteria). CXR with enlarged cardiomediastinal silhouette and L base atelectasis; no focal consolidations, pleural effusion, or pneumothorax. Lactate 1.3, negative RVP, and CTH negative for acute intracranial pathology (redemonstration of parenchymal volume loss + chronic microvascular changes).    Pt was given empiric IV CTX 1 g x1 and NS bolus 1L in the ED. Blood and urine cultures have been sent. (20 Dec 2023 21:57)    Hospital Course:      On day of discharge, patient is clinically stable with no new exam findings or acute symptoms compared to prior. The patient was seen by the attending physician on the date of discharge and deemed stable and acceptable for discharge. The patient's chronic medical conditions were treated accordingly per the patient's home medication regimen. The patient's medication reconciliation, follow up appointments, discharge instructions, and significant lab and diagnostic studies are as noted.   Important Medication Changes and Reason:    Active or Pending Issues Requiring Follow-up:    Advanced Directives:   [ ] Full code  [ ] DNR  [ ] Hospice    Discharge Diagnoses:         HPI:  68F with PMH significant for breast cancer s/p L mastectomy, TBI (s/p a fall ~2 years ago) c/b aphasia, meningioma, hypothyroidism (hx Grave's dz s/p radioactive iodine), CVA, and dysphagia who presents to [facility] ED on 12/20/2023 with no PO intake and increased lethargy in the last 2-3 days.    On encounter, pt is unable to communicate verbally, only opens eyes to voice. Unable to maintain attention or follow commands at this time. Tried contacting  (HCP), unable to reach at this time; called the son (Henok) to obtain collateral HPI. Per son, pt was verbal up to 2-3 days ago with prompting, usually able to answer Y/N questions or say her name. Pt was also able to tolerate thick liquids such as sauce. Approx 2-3 days ago, pt started not tolerating any PO intake, became significantly more lethargic at home, and was brought to the ED. Pt lives at home with , and is able to walk with a walker and a personal assistance. Reported to have relative left-sided weakness since the fall that led to TBI. Unable to assess ROS currently d/t encephalopathy/lethargy.    Vital signs stable with mild hypotension (T 36.6C, HR 78, BP 90/60, on RA, SpO2 >96%). Initial workup in the ED notable for borderline leukocytosis 10.87 with PMN predominance, significant hypernatremia 160 -> 166 (checking q6h), elevated procal 0.12, and positive UA (dark yellow, cloudy, +ketone, nitrite, 7 WBC, many bacteria). CXR with enlarged cardiomediastinal silhouette and L base atelectasis; no focal consolidations, pleural effusion, or pneumothorax. Lactate 1.3, negative RVP, and CTH negative for acute intracranial pathology (redemonstration of parenchymal volume loss + chronic microvascular changes).    Pt was given empiric IV CTX 1 g x1 and NS bolus 1L in the ED. Blood and urine cultures have been sent. (20 Dec 2023 21:57)    Hospital Course:      On day of discharge, patient is clinically stable with no new exam findings or acute symptoms compared to prior. The patient was seen by the attending physician on the date of discharge and deemed stable and acceptable for discharge. The patient's chronic medical conditions were treated accordingly per the patient's home medication regimen. The patient's medication reconciliation, follow up appointments, discharge instructions, and significant lab and diagnostic studies are as noted.   Important Medication Changes and Reason:    Active or Pending Issues Requiring Follow-up:    Advanced Directives:   [ ] Full code  [ ] DNR  [ ] Hospice    Discharge Diagnoses:         HPI:  68F with PMH significant for breast cancer s/p L mastectomy, TBI (s/p a fall ~2 years ago) c/b aphasia, meningioma, hypothyroidism (hx Grave's dz s/p radioactive iodine), CVA, and dysphagia who presents to [facility] ED on 12/20/2023 with no PO intake and increased lethargy in the last 2-3 days.    On encounter, pt is unable to communicate verbally, only opens eyes to voice. Unable to maintain attention or follow commands at this time. Tried contacting  (HCP), unable to reach at this time; called the son (Henok) to obtain collateral HPI. Per son, pt was verbal up to 2-3 days ago with prompting, usually able to answer Y/N questions or say her name. Pt was also able to tolerate thick liquids such as sauce. Approx 2-3 days ago, pt started not tolerating any PO intake, became significantly more lethargic at home, and was brought to the ED. Pt lives at home with , and is able to walk with a walker and a personal assistance. Reported to have relative left-sided weakness since the fall that led to TBI. Unable to assess ROS currently d/t encephalopathy/lethargy.    Vital signs stable with mild hypotension (T 36.6C, HR 78, BP 90/60, on RA, SpO2 >96%). Initial workup in the ED notable for borderline leukocytosis 10.87 with PMN predominance, significant hypernatremia 160 -> 166 (checking q6h), elevated procal 0.12, and positive UA (dark yellow, cloudy, +ketone, nitrite, 7 WBC, many bacteria). CXR with enlarged cardiomediastinal silhouette and L base atelectasis; no focal consolidations, pleural effusion, or pneumothorax. Lactate 1.3, negative RVP, and CTH negative for acute intracranial pathology (redemonstration of parenchymal volume loss + chronic microvascular changes).    Pt was given empiric IV CTX 1 g x1 and NS bolus 1L in the ED. Blood and urine cultures have been sent. (20 Dec 2023 21:57)    Hospital Course:  Patient was treated w/ 3 day course of ceftriaxone for E coli on urine culture.    #UTI  Patient initially completed a 3 day course of ceftriaxone for E. coli. Given worsening mental status and spike of fever, repeat U/A showed large LE, 20 WBC and too many bacteria.     #AHRF  RRT called during her hospital stay for hypoxia and hypotension. Suspect likely due to aspiration. Initially required HFNC but now back on RA. Patient completed a 5 day course of zosyn.     #Hypernatremia  On admission, found to have 166, improved w/ D5. NG tube placed w/ free water flushes.     #Severe Malnutrition #Failure to Thrive #Hypernatremia   Patient w/ NG tube. Multiple GOC conversations w/  (HCP) Dr. Arthur on patients' overall prognosis and     On day of discharge, patient is clinically stable with no new exam findings or acute symptoms compared to prior. The patient was seen by the attending physician on the date of discharge and deemed stable and acceptable for discharge. The patient's chronic medical conditions were treated accordingly per the patient's home medication regimen. The patient's medication reconciliation, follow up appointments, discharge instructions, and significant lab and diagnostic studies are as noted.     Important Medication Changes and Reason:    Active or Pending Issues Requiring Follow-up:      Advanced Directives:   [X] Full code  [ ] DNR  [ ] Hospice    Discharge Diagnoses:  Hypernatremia  Failure to Thrive  UTI          HPI:  68F with PMH significant for breast cancer s/p L mastectomy, TBI (s/p a fall ~2 years ago) c/b aphasia, meningioma, hypothyroidism (hx Grave's dz s/p radioactive iodine), CVA, and dysphagia who presents to [facility] ED on 12/20/2023 with no PO intake and increased lethargy in the last 2-3 days.    On encounter, pt is unable to communicate verbally, only opens eyes to voice. Unable to maintain attention or follow commands at this time. Tried contacting  (HCP), unable to reach at this time; called the son (Henok) to obtain collateral HPI. Per son, pt was verbal up to 2-3 days ago with prompting, usually able to answer Y/N questions or say her name. Pt was also able to tolerate thick liquids such as sauce. Approx 2-3 days ago, pt started not tolerating any PO intake, became significantly more lethargic at home, and was brought to the ED. Pt lives at home with , and is able to walk with a walker and a personal assistance. Reported to have relative left-sided weakness since the fall that led to TBI. Unable to assess ROS currently d/t encephalopathy/lethargy.    Vital signs stable with mild hypotension (T 36.6C, HR 78, BP 90/60, on RA, SpO2 >96%). Initial workup in the ED notable for borderline leukocytosis 10.87 with PMN predominance, significant hypernatremia 160 -> 166 (checking q6h), elevated procal 0.12, and positive UA (dark yellow, cloudy, +ketone, nitrite, 7 WBC, many bacteria). CXR with enlarged cardiomediastinal silhouette and L base atelectasis; no focal consolidations, pleural effusion, or pneumothorax. Lactate 1.3, negative RVP, and CTH negative for acute intracranial pathology (redemonstration of parenchymal volume loss + chronic microvascular changes).    Pt was given empiric IV CTX 1 g x1 and NS bolus 1L in the ED. Blood and urine cultures have been sent. (20 Dec 2023 21:57)    Hospital Course:  Patient was treated w/ 3 day course of ceftriaxone for E coli on urine culture.    #UTI  Patient initially completed a 3 day course of ceftriaxone for E. coli. Given worsening mental status and spike of fever, repeat U/A showed large LE, 20 WBC and too many bacteria.     #AHRF  RRT called during her hospital stay for hypoxia and hypotension. Suspect likely due to aspiration. Initially required HFNC but now back on RA. Patient completed a 5 day course of zosyn.     #Hypernatremia  On admission, found to have 166, improved w/ D5. NG tube placed w/ free water flushes.     #Severe Malnutrition #Failure to Thrive #Hypernatremia   Patient w/ NG tube. Multiple GOC conversations w/  (HCP) Dr. Arthur on patients' overall prognosis and     On day of discharge, patient is clinically stable with no new exam findings or acute symptoms compared to prior. The patient was seen by the attending physician on the date of discharge and deemed stable and acceptable for discharge. The patient's chronic medical conditions were treated accordingly per the patient's home medication regimen. The patient's medication reconciliation, follow up appointments, discharge instructions, and significant lab and diagnostic studies are as noted.     Important Medication Changes and Reason:    Active or Pending Issues Requiring Follow-up:      Advanced Directives:   [X] Full code  [ ] DNR  [ ] Hospice    Discharge Diagnoses:  Hypernatremia  Failure to Thrive  UTI          HPI:  68F with PMH significant for breast cancer s/p L mastectomy, TBI (s/p a fall ~2 years ago) c/b aphasia, meningioma, hypothyroidism (hx Grave's dz s/p radioactive iodine), CVA, and dysphagia who presents to [facility] ED on 12/20/2023 with no PO intake and increased lethargy in the last 2-3 days.    On encounter, pt is unable to communicate verbally, only opens eyes to voice. Unable to maintain attention or follow commands at this time. Tried contacting  (HCP), unable to reach at this time; called the son (Henok) to obtain collateral HPI. Per son, pt was verbal up to 2-3 days ago with prompting, usually able to answer Y/N questions or say her name. Pt was also able to tolerate thick liquids such as sauce. Approx 2-3 days ago, pt started not tolerating any PO intake, became significantly more lethargic at home, and was brought to the ED. Pt lives at home with , and is able to walk with a walker and a personal assistance. Reported to have relative left-sided weakness since the fall that led to TBI. Unable to assess ROS currently d/t encephalopathy/lethargy.    Vital signs stable with mild hypotension (T 36.6C, HR 78, BP 90/60, on RA, SpO2 >96%). Initial workup in the ED notable for borderline leukocytosis 10.87 with PMN predominance, significant hypernatremia 160 -> 166 (checking q6h), elevated procal 0.12, and positive UA (dark yellow, cloudy, +ketone, nitrite, 7 WBC, many bacteria). CXR with enlarged cardiomediastinal silhouette and L base atelectasis; no focal consolidations, pleural effusion, or pneumothorax. Lactate 1.3, negative RVP, and CTH negative for acute intracranial pathology (redemonstration of parenchymal volume loss + chronic microvascular changes).    Pt was given empiric IV CTX 1 g x1 and NS bolus 1L in the ED. Blood and urine cultures have been sent. (20 Dec 2023 21:57)    Hospital Course:  Patient was treated w/ 3 day course of ceftriaxone for E coli on urine culture.    #UTI  Patient initially completed a 3 day course of ceftriaxone for E. coli. Given worsening mental status and spike of fever, repeat U/A showed large LE, 20 WBC and too many bacteria.     #AHRF  RRT called during her hospital stay for hypoxia and hypotension. Suspect likely due to aspiration. Initially required HFNC but now back on RA. Patient completed a 5 day course of zosyn.     #Hypernatremia  On admission, found to have 166, improved w/ D5. NG tube placed w/ free water flushes.     #Severe Malnutrition #Failure to Thrive #Hypernatremia   Patient w/ NG tube. Multiple GOC conversations w/  (HCP) Dr. Arthur on patients' overall prognosis and     On day of discharge, patient is clinically stable with no new exam findings or acute symptoms compared to prior. The patient was seen by the attending physician on the date of discharge and deemed stable and acceptable for discharge. The patient's chronic medical conditions were treated accordingly per the patient's home medication regimen. The patient's medication reconciliation, follow up appointments, discharge instructions, and significant lab and diagnostic studies are as noted.     Important Medication Changes and Reason:    Active or Pending Issues Requiring Follow-up:      Advanced Directives:   [X] Full code  [ ] DNR  [ ] Hospice    Discharge Diagnoses:  Hypernatremia  Failure to Thrive  UTI          HPI:  68F with PMH significant for breast cancer s/p L mastectomy, TBI (s/p a fall ~2 years ago) c/b aphasia, meningioma, hypothyroidism (hx Grave's dz s/p radioactive iodine), CVA, and dysphagia who presents to [facility] ED on 12/20/2023 with no PO intake and increased lethargy in the last 2-3 days.    On encounter, pt is unable to communicate verbally, only opens eyes to voice. Unable to maintain attention or follow commands at this time. Tried contacting  (HCP), unable to reach at this time; called the son (Henok) to obtain collateral HPI. Per son, pt was verbal up to 2-3 days ago with prompting, usually able to answer Y/N questions or say her name. Pt was also able to tolerate thick liquids such as sauce. Approx 2-3 days ago, pt started not tolerating any PO intake, became significantly more lethargic at home, and was brought to the ED. Pt lives at home with , and is able to walk with a walker and a personal assistance. Reported to have relative left-sided weakness since the fall that led to TBI. Unable to assess ROS currently d/t encephalopathy/lethargy.    Vital signs stable with mild hypotension (T 36.6C, HR 78, BP 90/60, on RA, SpO2 >96%). Initial workup in the ED notable for borderline leukocytosis 10.87 with PMN predominance, significant hypernatremia 160 -> 166 (checking q6h), elevated procal 0.12, and positive UA (dark yellow, cloudy, +ketone, nitrite, 7 WBC, many bacteria). CXR with enlarged cardiomediastinal silhouette and L base atelectasis; no focal consolidations, pleural effusion, or pneumothorax. Lactate 1.3, negative RVP, and CTH negative for acute intracranial pathology (redemonstration of parenchymal volume loss + chronic microvascular changes).    Pt was given empiric IV CTX 1 g x1 and NS bolus 1L in the ED. Blood and urine cultures have been sent. (20 Dec 2023 21:57)    Hospital Course:  Patient was admitted for hypernatremia and UTI. She was started on D5 w/ adequate improvement in sodium. Her urine culture grew E coli and was subsequently treated w/ a 3 day course of ceftriaxone. A NG tube was placed to provide nutrition. Speech and swallow evaluated patient at bedside and initially recommended puree/moderately thick liquids via 1/2 tsp amount only. However, there was concern that patient may not be getting adequate intake with this recommended diet. Lengthy GOC conversation w/ the .  would like to evaluate for any reversible causes of the dysphagia. If nonreversible, would proceed w/ PEG tube placement. Per speech and swallow, patient was not a candidate for further objective testing.  requested a MRI head which was also negative for reversible causes. GI was consulted for a PEG tube placement ***     Hospital course was complicated by worsening mental status as well as RRT for hypoxia and hypotension. Suspect patient likely aspirated. She was initially placed on HFNC and now back on room air. She completed a 5 day course of empiric abx for aspiration and UTI.     On day of discharge, patient is clinically stable with no new exam findings or acute symptoms compared to prior. The patient was seen by the attending physician on the date of discharge and deemed stable and acceptable for discharge. The patient's chronic medical conditions were treated accordingly per the patient's home medication regimen. The patient's medication reconciliation, follow up appointments, discharge instructions, and significant lab and diagnostic studies are as noted.     Important Medication Changes and Reason:    Active or Pending Issues Requiring Follow-up:  - f/u w/ PCP in 1-2 weeks       Advanced Directives:   [X] Full code  [ ] DNR  [ ] Hospice    Discharge Diagnoses:  Hypernatremia  Failure to Thrive  UTI          HPI:  68F with PMH significant for breast cancer s/p L mastectomy, TBI (s/p a fall ~2 years ago) c/b aphasia, meningioma, hypothyroidism (hx Grave's dz s/p radioactive iodine), CVA, and dysphagia who presents to [facility] ED on 12/20/2023 with no PO intake and increased lethargy in the last 2-3 days.    On encounter, pt is unable to communicate verbally, only opens eyes to voice. Unable to maintain attention or follow commands at this time. Tried contacting  (HCP), unable to reach at this time; called the son (Henok) to obtain collateral HPI. Per son, pt was verbal up to 2-3 days ago with prompting, usually able to answer Y/N questions or say her name. Pt was also able to tolerate thick liquids such as sauce. Approx 2-3 days ago, pt started not tolerating any PO intake, became significantly more lethargic at home, and was brought to the ED. Pt lives at home with , and is able to walk with a walker and a personal assistance. Reported to have relative left-sided weakness since the fall that led to TBI. Unable to assess ROS currently d/t encephalopathy/lethargy.    Vital signs stable with mild hypotension (T 36.6C, HR 78, BP 90/60, on RA, SpO2 >96%). Initial workup in the ED notable for borderline leukocytosis 10.87 with PMN predominance, significant hypernatremia 160 -> 166 (checking q6h), elevated procal 0.12, and positive UA (dark yellow, cloudy, +ketone, nitrite, 7 WBC, many bacteria). CXR with enlarged cardiomediastinal silhouette and L base atelectasis; no focal consolidations, pleural effusion, or pneumothorax. Lactate 1.3, negative RVP, and CTH negative for acute intracranial pathology (redemonstration of parenchymal volume loss + chronic microvascular changes).    Pt was given empiric IV CTX 1 g x1 and NS bolus 1L in the ED. Blood and urine cultures have been sent. (20 Dec 2023 21:57)    Hospital Course:  Patient was admitted for hypernatremia and UTI. She was started on D5 w/ adequate improvement in sodium. Her urine culture grew E coli and was subsequently treated w/ a 3 day course of ceftriaxone. A NG tube was placed to provide nutrition. Speech and swallow evaluated patient at bedside and initially recommended puree/moderately thick liquids via 1/2 tsp amount only. However, there was concern that patient may not be getting adequate intake with this recommended diet. Lengthy GOC conversation w/ the .  would like to evaluate for any reversible causes of the dysphagia. If nonreversible, would proceed w/ PEG tube placement. Per speech and swallow, patient was not a candidate for further objective testing.  requested a MRI head which was also negative for reversible causes. GI was consulted for a PEG tube placement ***     Hospital course was complicated by worsening mental status as well as RRT for hypoxia and hypotension. Suspect patient likely aspirated. She was initially placed on HFNC and now back on room air. She completed a 5 day course of empiric abx for aspiration and UTI.     On day of discharge, patient is clinically stable with no new exam findings or acute symptoms compared to prior. The patient was seen by the attending physician on the date of discharge and deemed stable and acceptable for discharge. The patient's chronic medical conditions were treated accordingly per the patient's home medication regimen. The patient's medication reconciliation, follow up appointments, discharge instructions, and significant lab and diagnostic studies are as noted.     Important Medication Changes and Reason:    Active or Pending Issues Requiring Follow-up:  - f/u w/ PCP in 1-2 weeks       Advanced Directives:   [X] Full code  [ ] DNR  [ ] Hospice    Discharge Diagnoses:  Hypernatremia  Failure to Thrive  UTI          HPI:  68F with PMH significant for breast cancer s/p L mastectomy, TBI (s/p a fall ~2 years ago) c/b aphasia, meningioma, hypothyroidism (hx Grave's dz s/p radioactive iodine), CVA, and dysphagia who presents to [facility] ED on 12/20/2023 with no PO intake and increased lethargy in the last 2-3 days.    On encounter, pt is unable to communicate verbally, only opens eyes to voice. Unable to maintain attention or follow commands at this time. Tried contacting  (HCP), unable to reach at this time; called the son (Henok) to obtain collateral HPI. Per son, pt was verbal up to 2-3 days ago with prompting, usually able to answer Y/N questions or say her name. Pt was also able to tolerate thick liquids such as sauce. Approx 2-3 days ago, pt started not tolerating any PO intake, became significantly more lethargic at home, and was brought to the ED. Pt lives at home with , and is able to walk with a walker and a personal assistance. Reported to have relative left-sided weakness since the fall that led to TBI. Unable to assess ROS currently d/t encephalopathy/lethargy.    Vital signs stable with mild hypotension (T 36.6C, HR 78, BP 90/60, on RA, SpO2 >96%). Initial workup in the ED notable for borderline leukocytosis 10.87 with PMN predominance, significant hypernatremia 160 -> 166 (checking q6h), elevated procal 0.12, and positive UA (dark yellow, cloudy, +ketone, nitrite, 7 WBC, many bacteria). CXR with enlarged cardiomediastinal silhouette and L base atelectasis; no focal consolidations, pleural effusion, or pneumothorax. Lactate 1.3, negative RVP, and CTH negative for acute intracranial pathology (redemonstration of parenchymal volume loss + chronic microvascular changes).    Pt was given empiric IV CTX 1 g x1 and NS bolus 1L in the ED. Blood and urine cultures have been sent. (20 Dec 2023 21:57)    Hospital Course:  Patient was admitted for hypernatremia and UTI. She was started on D5 w/ adequate improvement in sodium. Her urine culture grew E coli and was subsequently treated w/ a 3 day course of ceftriaxone. A NG tube was placed to provide nutrition. Speech and swallow evaluated patient at bedside and initially recommended puree/moderately thick liquids via 1/2 tsp amount only. However, there was concern that patient may not be getting adequate intake with this recommended diet. Lengthy GOC conversation w/ the .  would like to evaluate for any reversible causes of the dysphagia. If nonreversible, would proceed w/ PEG tube placement. Per speech and swallow, patient was not a candidate for further objective testing.  requested a MRI head which was also negative for reversible causes. GI was consulted for a PEG tube placement ***     Hospital course was complicated by worsening mental status as well as RRT for hypoxia and hypotension. Suspect patient likely aspirated. She was initially placed on HFNC and now back on room air. She completed a 5 day course of empiric abx for aspiration and UTI.     On day of discharge, patient is clinically stable with no new exam findings or acute symptoms compared to prior. The patient was seen by the attending physician on the date of discharge and deemed stable and acceptable for discharge. The patient's chronic medical conditions were treated accordingly per the patient's home medication regimen. The patient's medication reconciliation, follow up appointments, discharge instructions, and significant lab and diagnostic studies are as noted.     Important Medication Changes and Reason:    Active or Pending Issues Requiring Follow-up:  - f/u w/ PCP in 1-2 weeks       Advanced Directives:   [X] Full code  [ ] DNR  [ ] Hospice    Discharge Diagnoses:  Hypernatremia  Failure to Thrive  UTI          HPI:  68F with PMH significant for breast cancer s/p L mastectomy, TBI (s/p a fall ~2 years ago) c/b aphasia, meningioma, hypothyroidism (hx Grave's dz s/p radioactive iodine), CVA, and dysphagia who presents to [facility] ED on 12/20/2023 with no PO intake and increased lethargy in the last 2-3 days.    On encounter, pt is unable to communicate verbally, only opens eyes to voice. Unable to maintain attention or follow commands at this time. Tried contacting  (HCP), unable to reach at this time; called the son (Henok) to obtain collateral HPI. Per son, pt was verbal up to 2-3 days ago with prompting, usually able to answer Y/N questions or say her name. Pt was also able to tolerate thick liquids such as sauce. Approx 2-3 days ago, pt started not tolerating any PO intake, became significantly more lethargic at home, and was brought to the ED. Pt lives at home with , and is able to walk with a walker and a personal assistance. Reported to have relative left-sided weakness since the fall that led to TBI. Unable to assess ROS currently d/t encephalopathy/lethargy.    Vital signs stable with mild hypotension (T 36.6C, HR 78, BP 90/60, on RA, SpO2 >96%). Initial workup in the ED notable for borderline leukocytosis 10.87 with PMN predominance, significant hypernatremia 160 -> 166 (checking q6h), elevated procal 0.12, and positive UA (dark yellow, cloudy, +ketone, nitrite, 7 WBC, many bacteria). CXR with enlarged cardiomediastinal silhouette and L base atelectasis; no focal consolidations, pleural effusion, or pneumothorax. Lactate 1.3, negative RVP, and CTH negative for acute intracranial pathology (redemonstration of parenchymal volume loss + chronic microvascular changes).    Pt was given empiric IV CTX 1 g x1 and NS bolus 1L in the ED. Blood and urine cultures have been sent. (20 Dec 2023 21:57)    Hospital Course:  Patient was admitted for hypernatremia and UTI. She was started on D5 w/ adequate improvement in sodium. Her urine culture grew E coli and was subsequently treated w/ a 3 day course of ceftriaxone. A NG tube was placed to provide nutrition. Speech and swallow evaluated patient at bedside and initially recommended puree/moderately thick liquids via 1/2 tsp amount only. However, there was concern that patient may not be getting adequate intake with this recommended diet. GI was consulted for a PEG tube placement, however  deferred PEG tube and requested an evaluation for reversible causes of dysphagia. MRI head negative. Eventually...    Hospital course was complicated by worsening mental status as well as RRT for hypoxia and hypotension. Suspect patient likely aspirated. She was initially placed on HFNC and now back on room air and at baseline. She completed a 5 day course of empiric abx for aspiration and UTI.     On day of discharge, patient is clinically stable with no new exam findings or acute symptoms compared to prior. The patient was seen by the attending physician on the date of discharge and deemed stable and acceptable for discharge. The patient's chronic medical conditions were treated accordingly per the patient's home medication regimen. The patient's medication reconciliation, follow up appointments, discharge instructions, and significant lab and diagnostic studies are as noted.     Important Medication Changes and Reason:    Active or Pending Issues Requiring Follow-up:  - f/u w/ PCP in 1-2 weeks       Advanced Directives:   [X] Full code  [ ] DNR  [ ] Hospice    Discharge Diagnoses:  Hypernatremia  Failure to Thrive  UTI          HPI:  68F with PMH significant for breast cancer s/p L mastectomy, TBI (s/p a fall ~2 years ago) c/b aphasia, meningioma, hypothyroidism (hx Grave's dz s/p radioactive iodine), CVA, and dysphagia who presents to [facility] ED on 12/20/2023 with no PO intake and increased lethargy in the last 2-3 days.    On encounter, pt is unable to communicate verbally, only opens eyes to voice. Unable to maintain attention or follow commands at this time. Tried contacting  (HCP), unable to reach at this time; called the son (Henok) to obtain collateral HPI. Per son, pt was verbal up to 2-3 days ago with prompting, usually able to answer Y/N questions or say her name. Pt was also able to tolerate thick liquids such as sauce. Approx 2-3 days ago, pt started not tolerating any PO intake, became significantly more lethargic at home, and was brought to the ED. Pt lives at home with , and is able to walk with a walker and a personal assistance. Reported to have relative left-sided weakness since the fall that led to TBI. Unable to assess ROS currently d/t encephalopathy/lethargy.    Vital signs stable with mild hypotension (T 36.6C, HR 78, BP 90/60, on RA, SpO2 >96%). Initial workup in the ED notable for borderline leukocytosis 10.87 with PMN predominance, significant hypernatremia 160 -> 166 (checking q6h), elevated procal 0.12, and positive UA (dark yellow, cloudy, +ketone, nitrite, 7 WBC, many bacteria). CXR with enlarged cardiomediastinal silhouette and L base atelectasis; no focal consolidations, pleural effusion, or pneumothorax. Lactate 1.3, negative RVP, and CTH negative for acute intracranial pathology (redemonstration of parenchymal volume loss + chronic microvascular changes).    Pt was given empiric IV CTX 1 g x1 and NS bolus 1L in the ED. Blood and urine cultures have been sent. (20 Dec 2023 21:57)    Hospital Course:  Patient was admitted for hypernatremia and UTI. She was started on D5 w/ adequate improvement in sodium. Her urine culture grew E coli and was subsequently treated w/ a 3 day course of ceftriaxone. A NG tube was placed to provide nutrition. Speech and swallow evaluated patient at bedside and initially recommended puree/moderately thick liquids via 1/2 tsp amount only. However, there was concern that patient may not be getting adequate intake with this recommended diet. GI was consulted for a PEG tube placement, however  deferred PEG tube and requested an evaluation for reversible causes of dysphagia. MRI head negative. Eventually...    Hospital course was complicated by worsening mental status as well as RRT for hypoxia and hypotension. Suspect patient likely aspirated. She was initially placed on HFNC and now back on room air and at baseline. She completed a 5 day course of empiric abx for aspiration and UTI.     On day of discharge, patient is clinically stable with no new exam findings or acute symptoms compared to prior. The patient was seen by the attending physician on the date of discharge and deemed stable and acceptable for discharge. The patient's chronic medical conditions were treated accordingly per the patient's home medication regimen. The patient's medication reconciliation, follow up appointments, discharge instructions, and significant lab and diagnostic studies are as noted.     Important Medication Changes and Reason:    Active or Pending Issues Requiring Follow-up:  - f/u w/ PCP in 1-2 weeks       Advanced Directives:   [X] Full code  [ ] DNR  [ ] Hospice    Discharge Diagnoses:  Hypernatremia  Failure to Thrive  UTI          HPI:  68F with PMH significant for breast cancer s/p L mastectomy, TBI (s/p a fall ~2 years ago) c/b aphasia, meningioma, hypothyroidism (hx Grave's dz s/p radioactive iodine), CVA, and dysphagia who presents to [facility] ED on 12/20/2023 with no PO intake and increased lethargy in the last 2-3 days.    On encounter, pt is unable to communicate verbally, only opens eyes to voice. Unable to maintain attention or follow commands at this time. Tried contacting  (HCP), unable to reach at this time; called the son (Henok) to obtain collateral HPI. Per son, pt was verbal up to 2-3 days ago with prompting, usually able to answer Y/N questions or say her name. Pt was also able to tolerate thick liquids such as sauce. Approx 2-3 days ago, pt started not tolerating any PO intake, became significantly more lethargic at home, and was brought to the ED. Pt lives at home with , and is able to walk with a walker and a personal assistance. Reported to have relative left-sided weakness since the fall that led to TBI. Unable to assess ROS currently d/t encephalopathy/lethargy.    Vital signs stable with mild hypotension (T 36.6C, HR 78, BP 90/60, on RA, SpO2 >96%). Initial workup in the ED notable for borderline leukocytosis 10.87 with PMN predominance, significant hypernatremia 160 -> 166 (checking q6h), elevated procal 0.12, and positive UA (dark yellow, cloudy, +ketone, nitrite, 7 WBC, many bacteria). CXR with enlarged cardiomediastinal silhouette and L base atelectasis; no focal consolidations, pleural effusion, or pneumothorax. Lactate 1.3, negative RVP, and CTH negative for acute intracranial pathology (redemonstration of parenchymal volume loss + chronic microvascular changes).    Pt was given empiric IV CTX 1 g x1 and NS bolus 1L in the ED. Blood and urine cultures have been sent. (20 Dec 2023 21:57)    Hospital Course:  Patient was admitted for hypernatremia and UTI. She was started on D5 w/ adequate improvement in sodium. Her urine culture grew E coli and was subsequently treated w/ a 3 day course of ceftriaxone. A NG tube was placed to provide nutrition. Speech and swallow evaluated patient at bedside and initially recommended puree/moderately thick liquids via 1/2 tsp amount only. However, there was concern that patient may not be getting adequate intake with this recommended diet. GI was consulted for a PEG tube placement, however  deferred PEG tube and requested an evaluation for reversible causes of dysphagia. MRI head negative. Eventually...    Hospital course was complicated by worsening mental status as well as RRT for hypoxia and hypotension. Suspect patient likely aspirated. She was initially placed on HFNC and now back on room air and at baseline. She completed a 5 day course of empiric abx for aspiration and UTI.     On day of discharge, patient is clinically stable with no new exam findings or acute symptoms compared to prior. The patient was seen by the attending physician on the date of discharge and deemed stable and acceptable for discharge. The patient's chronic medical conditions were treated accordingly per the patient's home medication regimen. The patient's medication reconciliation, follow up appointments, discharge instructions, and significant lab and diagnostic studies are as noted.     Important Medication Changes and Reason:    Active or Pending Issues Requiring Follow-up:  - f/u w/ PCP in 1-2 weeks       Advanced Directives:   [X] Full code  [ ] DNR  [ ] Hospice    Discharge Diagnoses:  Hypernatremia  Failure to Thrive  UTI          Discharge Summary     Admit Date: 12-20-23  Discharge Date: 1/17/2024    Admission diagnoses:     Hyperosmolality with hypernatremia    Discharge diagnoses:     Hypernatremia  Failure to Thrive  UTI     Hospital Course:   For full details, please see H&P, progress notes, consult notes and ancillary notes. Briefly, JONATHAN CHILD is a 68y Female with a history of ignificant for breast cancer s/p L mastectomy, TBI (s/p a fall ~2 years ago) c/b aphasia, meningioma, hypothyroidism (hx Grave's dz s/p radioactive iodine), CVA, and dysphagia who presents to [facility] ED on 12/20/2023 with no PO intake and increased lethargy in the last 2-3 days prior to admission.    Patient was admitted for hypernatremia and UTI. She was started on D5 w/ adequate improvement in sodium. Her urine culture grew E coli and was subsequently treated w/ a 3 day course of ceftriaxone. Hospital course was complicated by worsening mental status as well as RRT for hypoxia and hypotension. Suspect patient likely aspirated. She was initially placed on HFNC and now back on room air and at baseline. She completed a 5 day course of empiric abx for aspiration and UTI.  A NG tube was placed to provide nutrition. Speech and swallow evaluated patient at bedside and initially recommended puree/moderately thick liquids via 1/2 tsp amount only. However, there was concern that patient may not be getting adequate intake with this recommended diet.  was agreeable to try feeds even with risk of aspiration.  GI was consulted for a PEG tube placement, however  deferred PEG tube and requested an evaluation for reversible causes of dysphagia. MRI head negative. Patient did not significantly improve with tube feeds, and subsequently patient's  was agreeable to PEG tube.  Patient's status has remained unchanged since the PEG tube.  Patient will be discharged on continuous tube feeds.  Continuous tube feeds (Glucerna 1.2 Vidal, start at facility at 10mL per hour and increase every 4 hours to goal of 50ml per hour of continuous feeds.  Add Carlos x 2)    Patient has also had poor oral hygiene with plaque on the roof of her mouth and tongue.  She will require a dentist to examine her for her oral and physical health.    On day of discharge, patient is clinically stable with no new exam findings or acute symptoms compared to prior. The patient was seen by the attending physician on the date of discharge and deemed stable and acceptable for discharge. The patient's chronic medical conditions were treated accordingly per the patient's home medication regimen. The patient's medication reconciliation (with changes made to chronic medications), follow up appointments, discharge orders, instructions, and significant lab and diagnostic studies are as noted.     Discharge follow up action items:     1. Follow up with PCP in 1-2 weeks. Follow up with dentist within 1 week  2. Follow up labs, path, & imaging:  N/A  3. Medication changes: Continuous tube feeds (Glucerna 1.2 Vidal, start at facility at 10mL per hour and increase every 4 hours to goal of 50ml per hour of continuous feeds.  Add Carlos x 2)  4. On hold medications: Tamoxifen until re-evaluation by PCP    Patient's ordered code status: FULL CODE    Patient disposition: Skilled nursing facility Discharge Summary     Admit Date: 12-20-23  Discharge Date: 1/17/2024    Admission diagnoses:     Hyperosmolality with hypernatremia    Discharge diagnoses:     Hypernatremia  Failure to Thrive  UTI     Hospital Course:   For full details, please see H&P, progress notes, consult notes and ancillary notes. Briefly, JONATHAN CHILD is a 68y Female with a history of ignificant for breast cancer s/p L mastectomy, TBI (s/p a fall ~2 years ago) c/b aphasia, meningioma, hypothyroidism (hx Grave's dz s/p radioactive iodine), CVA, and dysphagia who presents to [facility] ED on 12/20/2023 with no PO intake and increased lethargy in the last 2-3 days prior to admission.    Patient was admitted for hypernatremia and UTI. She was started on D5 w/ adequate improvement in sodium. Her urine culture grew E coli and was subsequently treated w/ a 3 day course of ceftriaxone. Hospital course was complicated by worsening mental status as well as RRT for hypoxia and hypotension. Suspect patient likely aspirated. She was initially placed on HFNC and now back on room air and at baseline. She completed a 5 day course of empiric abx for aspiration and UTI.  A NG tube was placed to provide nutrition. Speech and swallow evaluated patient at bedside and initially recommended puree/moderately thick liquids via 1/2 tsp amount only. However, there was concern that patient may not be getting adequate intake with this recommended diet.  was agreeable to try feeds even with risk of aspiration.  GI was consulted for a PEG tube placement, however  deferred PEG tube and requested an evaluation for reversible causes of dysphagia. MRI head negative. Patient did not significantly improve with tube feeds, and subsequently patient's  was agreeable to PEG tube.  Patient's status has remained unchanged since the PEG tube.  Patient will be discharged on continuous tube feeds.  Continuous tube feeds (Glucerna 1.2 Vidal, start at facility at 10mL per hour and increase every 4 hours to goal of 50ml per hour of continuous feeds.  Add Carlos x 2)    Patient has also had poor oral hygiene with plaque on the roof of her mouth and tongue.  She will require a dentist to examine her for her oral and physical health.    On day of discharge, patient is clinically stable with no new exam findings or acute symptoms compared to prior. The patient was seen by the attending physician on the date of discharge and deemed stable and acceptable for discharge. The patient's chronic medical conditions were treated accordingly per the patient's home medication regimen. The patient's medication reconciliation (with changes made to chronic medications), follow up appointments, discharge orders, instructions, and significant lab and diagnostic studies are as noted.     Discharge follow up action items:     1. Follow up with PCP in 1-2 weeks. Follow up with dentist within 1 week  2. Follow up labs, path, & imaging:  N/A  3. Medication changes: Continuous tube feeds (Glucerna 1.2 Vidal, start at facility at 10mL per hour and increase every 4 hours to goal of 50ml per hour of continuous feeds.  Add Carlos x 2)  4. On hold medications: Tamoxifen until re-evaluation by PCP    Patient's ordered code status: FULL CODE    Patient disposition: Skilled nursing facility Discharge Summary     Admit Date: 12-20-23  Discharge Date: 1/17/2024    Admission diagnoses:     Hyperosmolality with hypernatremia    Discharge diagnoses:     Hypernatremia  Failure to Thrive  UTI     Hospital Course:   For full details, please see H&P, progress notes, consult notes and ancillary notes. Briefly, JONATHAN CHILD is a 68y Female with a history of ignificant for breast cancer s/p L mastectomy, TBI (s/p a fall ~2 years ago) c/b aphasia, meningioma, hypothyroidism (hx Grave's dz s/p radioactive iodine), CVA, and dysphagia who presents to [facility] ED on 12/20/2023 with no PO intake and increased lethargy in the last 2-3 days prior to admission.    Patient was admitted for hypernatremia and UTI. She was started on D5 w/ adequate improvement in sodium. Her urine culture grew E coli and was subsequently treated w/ a 3 day course of ceftriaxone. Hospital course was complicated by worsening mental status as well as RRT for hypoxia and hypotension. Suspect patient likely aspirated. She was initially placed on HFNC and now back on room air and at baseline. She completed a 5 day course of empiric abx for aspiration and UTI.  A NG tube was placed to provide nutrition. Speech and swallow evaluated patient at bedside and initially recommended puree/moderately thick liquids via 1/2 tsp amount only. However, there was concern that patient may not be getting adequate intake with this recommended diet.  was agreeable to try feeds even with risk of aspiration.  GI was consulted for a PEG tube placement, however  deferred PEG tube and requested an evaluation for reversible causes of dysphagia. MRI head negative. Patient did not significantly improve with tube feeds, and subsequently patient's  was agreeable to PEG tube.  Patient's status has remained unchanged since the PEG tube.  Patient will be discharged on continuous tube feeds.  Continuous tube feeds (Glucerna 1.2 Vidal, start at facility at 10mL per hour and increase every 4 hours to goal of 50ml per hour of continuous feeds.  Add Carlos x 2)    Patient has also had poor oral hygiene with plaque on the roof of her mouth and tongue.  She will require a dentist to examine her for her oral and physical health.    On day of discharge, patient is clinically stable with no new exam findings or acute symptoms compared to prior. The patient was seen by the attending physician on the date of discharge and deemed stable and acceptable for discharge. The patient's chronic medical conditions were treated accordingly per the patient's home medication regimen. The patient's medication reconciliation (with changes made to chronic medications), follow up appointments, discharge orders, instructions, and significant lab and diagnostic studies are as noted.     Discharge follow up action items:     1. Follow up with PCP in 1-2 weeks. Follow up with dentist within 1 week  2. Follow up labs, path, & imaging:  N/A  3. Medication changes: Continuous tube feeds (Glucerna 1.2 Vidal, start at facility at 10mL per hour and increase every 4 hours to goal of 50ml per hour of continuous feeds.  Add Carlos x 2)  4. On hold medications: Tamoxifen until re-evaluation by PCP    Patient's ordered code status: FULL CODE    Patient disposition: Skilled nursing facility Discharge Summary     Admit Date: 12-20-23  Discharge Date: 1/17/2024    Admission diagnoses:     Hyperosmolality with hypernatremia    Discharge diagnoses:     Hypernatremia  Failure to Thrive  UTI     Hospital Course:   For full details, please see H&P, progress notes, consult notes and ancillary notes. Briefly, JONATHAN CHILD is a 68y Female with a history of ignificant for breast cancer s/p L mastectomy, TBI (s/p a fall ~2 years ago) c/b aphasia, meningioma, hypothyroidism (hx Grave's dz s/p radioactive iodine), CVA, and dysphagia who presents to [facility] ED on 12/20/2023 with no PO intake and increased lethargy in the last 2-3 days prior to admission.    Patient was admitted for hypernatremia and UTI. She was started on D5 w/ adequate improvement in sodium. Her urine culture grew E coli and was subsequently treated w/ a 3 day course of ceftriaxone. Hospital course was complicated by worsening mental status as well as RRT for hypoxia and hypotension. Suspect patient likely aspirated. She was initially placed on HFNC and now back on room air and at baseline. She completed a 5 day course of empiric abx for aspiration and UTI.  A NG tube was placed to provide nutrition. Speech and swallow evaluated patient at bedside and initially recommended puree/moderately thick liquids via 1/2 tsp amount only. However, there was concern that patient may not be getting adequate intake with this recommended diet.  was agreeable to try feeds even with risk of aspiration.  GI was consulted for a PEG tube placement, however  deferred PEG tube and requested an evaluation for reversible causes of dysphagia. MRI head negative. Patient did not significantly improve with tube feeds, and subsequently patient's  was agreeable to PEG tube.  Patient's status has remained unchanged since the PEG tube.  Patient will be discharged on continuous tube feeds.  Continuous tube feeds (Glucerna 1.2 Vidal, start at facility at 10mL per hour and increase every 4 hours to goal of 50ml per hour of continuous feeds.  Add Carlos x 2)    Patient has also had poor oral hygiene with plaque on the roof of her mouth and tongue.  She will require a dentist to examine her for her oral and physical health.    On day of discharge, patient is clinically stable with no new exam findings or acute symptoms compared to prior. The patient was seen by the attending physician on the date of discharge and deemed stable and acceptable for discharge. The patient's chronic medical conditions were treated accordingly per the patient's home medication regimen. The patient's medication reconciliation (with changes made to chronic medications), follow up appointments, discharge orders, instructions, and significant lab and diagnostic studies are as noted.     Discharge follow up action items:     1. Follow up with PCP in 1-2 weeks (coordinate care and re-evaluate tamoxifen). Follow up with dentist within 1 week  2. Follow up labs, path, & imaging:  N/A  3. Medication changes: Continuous tube feeds (Glucerna 1.2 Vidal, start at facility at 10mL per hour and increase every 4 hours to goal of 50ml per hour of continuous feeds.  Add Carlos x 2)  4. On hold medications: Tamoxifen until re-evaluation by PCP    Patient's ordered code status: FULL CODE    Patient disposition: Skilled nursing facility Discharge Summary     Admit Date: 12-20-23  Discharge Date: 1/17/2024    Admission diagnoses:     Hyperosmolality with hypernatremia    Discharge diagnoses:     Hypernatremia  Failure to Thrive  UTI     Hospital Course:   For full details, please see H&P, progress notes, consult notes and ancillary notes. Briefly, JONATHAN CHILD is a 68y Female with a history of ignificant for breast cancer s/p L mastectomy, TBI (s/p a fall ~2 years ago) c/b aphasia, meningioma, hypothyroidism (hx Grave's dz s/p radioactive iodine), CVA, and dysphagia who presents to [facility] ED on 12/20/2023 with no PO intake and increased lethargy in the last 2-3 days prior to admission.    Patient was admitted for hypernatremia and UTI. She was started on D5 w/ adequate improvement in sodium. Her urine culture grew E coli and was subsequently treated w/ a 3 day course of ceftriaxone. Hospital course was complicated by worsening mental status as well as RRT for hypoxia and hypotension. Suspect patient likely aspirated. She was initially placed on HFNC and now back on room air and at baseline. She completed a 5 day course of empiric abx for aspiration and UTI.  A NG tube was placed to provide nutrition. Speech and swallow evaluated patient at bedside and initially recommended puree/moderately thick liquids via 1/2 tsp amount only. However, there was concern that patient may not be getting adequate intake with this recommended diet.  was agreeable to try feeds even with risk of aspiration.  GI was consulted for a PEG tube placement, however  deferred PEG tube and requested an evaluation for reversible causes of dysphagia. MRI head negative. Patient did not significantly improve with tube feeds, and subsequently patient's  was agreeable to PEG tube.  Patient's status has remained unchanged since the PEG tube.  Patient will be discharged on continuous tube feeds.  Continuous tube feeds (Glucerna 1.2 Vidal, start at facility at 10mL per hour and increase every 4 hours to goal of 50ml per hour of continuous feeds.  Add Carlos x 2)    Patient has also had poor oral hygiene with plaque on the roof of her mouth and tongue.  She will require a dentist to examine her for her oral and physical health.    On day of discharge, patient is clinically stable with no new exam findings or acute symptoms compared to prior. The patient was seen by the attending physician on the date of discharge and deemed stable and acceptable for discharge. The patient's chronic medical conditions were treated accordingly per the patient's home medication regimen. The patient's medication reconciliation (with changes made to chronic medications), follow up appointments, discharge orders, instructions, and significant lab and diagnostic studies are as noted.     Discharge follow up action items:     1. Follow up with PCP in 1-2 weeks (coordinate care and re-evaluate tamoxifen). Follow up with dentist within 1 week  2. Follow up labs, path, & imaging:  N/A  3. Medication changes: Continuous tube feeds (Glucerna 1.2 Vidal, start at facility at 10mL per hour and increase every 4 hours to goal of 50ml per hour of continuous feeds.  Add Carlos x 2)  4. On hold medications: Tamoxifen until re-evaluation by PCP    Patient's ordered code status: FULL CODE    Patient disposition: Skilled nursing facility Discharge Summary     Admit Date: 12-20-23  Discharge Date: 1/17/2024    Admission diagnoses:     Hyperosmolality with hypernatremia    Discharge diagnoses:     Hypernatremia  Failure to Thrive  UTI     Hospital Course:   For full details, please see H&P, progress notes, consult notes and ancillary notes. Briefly, JONATHAN CHILD is a 68y Female with a history of ignificant for breast cancer s/p L mastectomy, TBI (s/p a fall ~2 years ago) c/b aphasia, meningioma, hypothyroidism (hx Grave's dz s/p radioactive iodine), CVA, and dysphagia who presents to [facility] ED on 12/20/2023 with no PO intake and increased lethargy in the last 2-3 days prior to admission.    Patient was admitted for hypernatremia and UTI. She was started on D5 w/ adequate improvement in sodium. Her urine culture grew E coli and was subsequently treated w/ a 3 day course of ceftriaxone. Hospital course was complicated by worsening mental status as well as RRT for hypoxia and hypotension. Suspect patient likely aspirated. She was initially placed on HFNC and now back on room air and at baseline. She completed a 5 day course of empiric abx for aspiration and UTI.  A NG tube was placed to provide nutrition. Speech and swallow evaluated patient at bedside and initially recommended puree/moderately thick liquids via 1/2 tsp amount only. However, there was concern that patient may not be getting adequate intake with this recommended diet.  was agreeable to try feeds even with risk of aspiration.  GI was consulted for a PEG tube placement, however  deferred PEG tube and requested an evaluation for reversible causes of dysphagia. MRI head negative. Patient did not significantly improve with tube feeds, and subsequently patient's  was agreeable to PEG tube.  Patient's status has remained unchanged since the PEG tube.  Patient will be discharged on continuous tube feeds.  Continuous tube feeds (Glucerna 1.2 Vidal, start at facility at 10mL per hour and increase every 4 hours to goal of 50ml per hour of continuous feeds.  Add Carlos x 2)    Patient has also had poor oral hygiene with plaque on the roof of her mouth and tongue.  She will require a dentist to examine her for her oral and physical health.    On day of discharge, patient is clinically stable with no new exam findings or acute symptoms compared to prior. The patient was seen by the attending physician on the date of discharge and deemed stable and acceptable for discharge. The patient's chronic medical conditions were treated accordingly per the patient's home medication regimen. The patient's medication reconciliation (with changes made to chronic medications), follow up appointments, discharge orders, instructions, and significant lab and diagnostic studies are as noted.     Discharge follow up action items:     1. Follow up with PCP in 1-2 weeks (coordinate care and re-evaluate tamoxifen). Follow up with dentist within 1 week  2. Follow up labs, path, & imaging:  N/A  3. Medication changes: Continuous tube feeds (Glucerna 1.2 Vidal, start at facility at 10mL per hour and increase every 4 hours to goal of 50ml per hour of continuous feeds.  Add Carlos x 2)  4. On hold medications: Tamoxifen until re-evaluation by PCP    Patient's ordered code status: FULL CODE    Patient disposition: Skilled nursing facility Discharge Summary     Admit Date: 12-20-23  Discharge Date: 1/17/2024    Admission diagnoses:     Hyperosmolality with hypernatremia    Discharge diagnoses:     Hypernatremia  Failure to Thrive  UTI     Hospital Course:   For full details, please see H&P, progress notes, consult notes and ancillary notes. Briefly, JONATHAN CHILD is a 68y Female with a history of significant for breast cancer s/p L mastectomy, TBI (s/p a fall ~2 years ago) c/b aphasia, meningioma, hypothyroidism (hx Grave's dz s/p radioactive iodine), CVA, and dysphagia who presents to [facility] ED on 12/20/2023 with no PO intake and increased lethargy in the last 2-3 days prior to admission.    Patient was admitted for hypernatremia and UTI. She was started on D5 w/ adequate improvement in sodium. Her urine culture grew E coli and was subsequently treated w/ a 3 day course of ceftriaxone. Hospital course was complicated by worsening mental status as well as RRT for hypoxia and hypotension. Suspect patient likely aspirated. She was initially placed on HFNC and now back on room air and at baseline. She completed a 5 day course of empiric abx for aspiration and UTI.  A NG tube was placed to provide nutrition. Speech and swallow evaluated patient at bedside and initially recommended puree/moderately thick liquids via 1/2 tsp amount only. However, there was concern that patient may not be getting adequate intake with this recommended diet.  was agreeable to try feeds even with risk of aspiration.  GI was consulted for a PEG tube placement, however  deferred PEG tube and requested an evaluation for reversible causes of dysphagia. MRI head negative. Patient did not significantly improve with tube feeds, and subsequently patient's  was agreeable to PEG tube.  Patient's status has remained unchanged since the PEG tube.  Patient will be discharged on continuous tube feeds.  Continuous tube feeds (Glucerna 1.2 Vidal, start at facility at 10mL per hour and increase every 4 hours to goal of 50ml per hour of continuous feeds.  Add Carlos x 2)    Patient has also had poor oral hygiene with plaque on the roof of her mouth and tongue.  She will require a dentist to examine her for her oral and physical health.    On day of discharge, patient is clinically stable with no new exam findings or acute symptoms compared to prior. The patient was seen by the attending physician on the date of discharge and deemed stable and acceptable for discharge. The patient's chronic medical conditions were treated accordingly per the patient's home medication regimen. The patient's medication reconciliation (with changes made to chronic medications), follow up appointments, discharge orders, instructions, and significant lab and diagnostic studies are as noted.     Discharge follow up action items:     1. Follow up with PCP in 1-2 weeks (coordinate care and re-evaluate tamoxifen). Follow up with dentist within 1 week  2. Follow up labs, path, & imaging:  N/A  3. Medication changes: Continuous tube feeds (Glucerna 1.2 Vidal, start at facility at 10mL per hour and increase every 4 hours to goal of 50ml per hour of continuous feeds.  Add Carlos x 2)      Patient's ordered code status: FULL CODE    Patient disposition: Skilled nursing facility Discharge Summary     Admit Date: 12-20-23  Discharge Date: 1/17/2024    Admission diagnoses:     Hyperosmolality with hypernatremia    Discharge diagnoses:     Hypernatremia  Failure to Thrive  UTI     Hospital Course:   For full details, please see H&P, progress notes, consult notes and ancillary notes. Briefly, JONATHAN CHILD is a 68y Female with a history of significant for breast cancer s/p L mastectomy, TBI (s/p a fall ~2 years ago) c/b aphasia, meningioma, hypothyroidism (hx Grave's dz s/p radioactive iodine), CVA, and dysphagia who presents to [facility] ED on 12/20/2023 with no PO intake and increased lethargy in the last 2-3 days prior to admission.    Patient was admitted for hypernatremia and UTI. She was started on D5 w/ adequate improvement in sodium. Her urine culture grew E coli and was subsequently treated w/ a 3 day course of ceftriaxone. Hospital course was complicated by worsening mental status as well as RRT for hypoxia and hypotension. Suspect patient likely aspirated. She was initially placed on HFNC and now back on room air and at baseline. She completed a 5 day course of empiric abx for aspiration and UTI.  A NG tube was placed to provide nutrition. Speech and swallow evaluated patient at bedside and initially recommended puree/moderately thick liquids via 1/2 tsp amount only. However, there was concern that patient may not be getting adequate intake with this recommended diet.  was agreeable to try feeds even with risk of aspiration.  GI was consulted for a PEG tube placement, however  deferred PEG tube and requested an evaluation for reversible causes of dysphagia. MRI head negative. Patient did not significantly improve with tube feeds, and subsequently patient's  was agreeable to PEG tube.  Patient's status has remained unchanged since the PEG tube.  Patient will be discharged on continuous tube feeds.  Continuous tube feeds (Glucerna 1.2 Vidal, start at facility at 10mL per hour and increase every 4 hours to goal of 50ml per hour of continuous feeds.  Add Carlos x 2)    Patient has also had poor oral hygiene with plaque on the roof of her mouth and tongue.  She will require a dentist to examine her for her oral and physical health.    On day of discharge, patient is clinically stable with no new exam findings or acute symptoms compared to prior. The patient was seen by the attending physician on the date of discharge and deemed stable and acceptable for discharge. The patient's chronic medical conditions were treated accordingly per the patient's home medication regimen. The patient's medication reconciliation (with changes made to chronic medications), follow up appointments, discharge orders, instructions, and significant lab and diagnostic studies are as noted.     Discharge follow up action items:     1. Follow up with PCP in 1-2 weeks (coordinate care and re-evaluate tamoxifen). Follow up with dentist within 1 week  2. Follow up labs, path, & imaging:  N/A  3. Medication changes: Continuous tube feeds (Glucerna 1.2 Vidal, start at facility at 10mL per hour and increase every 4 hours to goal of 50ml per hour of continuous feeds.  Add Carlos x 2)      Patient's ordered code status: FULL CODE    Patient disposition: Skilled nursing facility Discharge Summary     Admit Date: 12-20-23  Discharge Date: 1/17/2024    Admission diagnoses:     Hyperosmolality with hypernatremia    Discharge diagnoses:     Hypernatremia  Failure to Thrive  UTI     Hospital Course:   For full details, please see H&P, progress notes, consult notes and ancillary notes. Briefly, JONATHAN CHILD is a 68y Female with a history of significant for breast cancer s/p L mastectomy, TBI (s/p a fall ~2 years ago) c/b aphasia, meningioma, hypothyroidism (hx Grave's dz s/p radioactive iodine), CVA, and dysphagia who presents to [facility] ED on 12/20/2023 with no PO intake and increased lethargy in the last 2-3 days prior to admission.    Patient was admitted for hypernatremia and UTI. She was started on D5 w/ adequate improvement in sodium. Her urine culture grew E coli and was subsequently treated w/ a 3 day course of ceftriaxone. Hospital course was complicated by worsening mental status as well as RRT for hypoxia and hypotension. Suspect patient likely aspirated. She was initially placed on HFNC and now back on room air and at baseline. She completed a 5 day course of empiric abx for aspiration and UTI.  A NG tube was placed to provide nutrition. Speech and swallow evaluated patient at bedside and initially recommended puree/moderately thick liquids via 1/2 tsp amount only. However, there was concern that patient may not be getting adequate intake with this recommended diet.  was agreeable to try feeds even with risk of aspiration.  GI was consulted for a PEG tube placement, however  deferred PEG tube and requested an evaluation for reversible causes of dysphagia. MRI head negative. Patient did not significantly improve with tube feeds, and subsequently patient's  was agreeable to PEG tube.  Patient's status has remained unchanged since the PEG tube.  Patient will be discharged on continuous tube feeds.  Continuous tube feeds (Glucerna 1.2 Vidal, start at facility at 10mL per hour and increase every 4 hours to goal of 50ml per hour of continuous feeds.  Add Carlos x 2)    Patient has also had poor oral hygiene with plaque on the roof of her mouth and tongue.  She will require a dentist to examine her for her oral and physical health.    On day of discharge, patient is clinically stable with no new exam findings or acute symptoms compared to prior. The patient was seen by the attending physician on the date of discharge and deemed stable and acceptable for discharge. The patient's chronic medical conditions were treated accordingly per the patient's home medication regimen. The patient's medication reconciliation (with changes made to chronic medications), follow up appointments, discharge orders, instructions, and significant lab and diagnostic studies are as noted.     Discharge follow up action items:     1. Follow up with PCP in 1-2 weeks (coordinate care and re-evaluate tamoxifen). Follow up with dentist within 1 week  2. Follow up labs, path, & imaging:  N/A  3. Medication changes: Continuous tube feeds (Glucerna 1.2 Vidal, start at facility at 10mL per hour and increase every 4 hours to goal of 50ml per hour of continuous feeds.  Add Carlos x 2)      Patient's ordered code status: FULL CODE    Patient disposition: Skilled nursing facility Discharge Summary     Admit Date: 12-20-23  Discharge Date: 1/17/2024    Admission diagnoses:     Hyperosmolality with hypernatremia    Discharge diagnoses:     Hypernatremia  Failure to Thrive  UTI     Hospital Course:   For full details, please see H&P, progress notes, consult notes and ancillary notes. Briefly, JONATHAN CHILD is a 68y Female with a history of significant for breast cancer s/p L mastectomy, TBI (s/p a fall ~2 years ago) c/b aphasia, meningioma, hypothyroidism (hx Grave's dz s/p radioactive iodine), CVA, and dysphagia who presents to [facility] ED on 12/20/2023 with no PO intake and increased lethargy in the last 2-3 days prior to admission.    Patient was admitted for hypernatremia and UTI. She was started on D5 w/ adequate improvement in sodium. Her urine culture grew E coli and was subsequently treated w/ a 3 day course of ceftriaxone. Hospital course was complicated by worsening mental status as well as RRT for hypoxia and hypotension. Suspect patient likely aspirated. She was initially placed on HFNC and now back on room air and at baseline. She completed a 5 day course of empiric abx for aspiration and UTI.  A NG tube was placed to provide nutrition. Speech and swallow evaluated patient at bedside and initially recommended puree/moderately thick liquids via 1/2 tsp amount only. However, there was concern that patient may not be getting adequate intake with this recommended diet.  was agreeable to try feeds even with risk of aspiration.  GI was consulted for a PEG tube placement, however  deferred PEG tube and requested an evaluation for reversible causes of dysphagia. MRI head negative. Patient did not significantly improve with tube feeds, and subsequently patient's  was agreeable to PEG tube.  Patient's status has remained unchanged since the PEG tube.  Patient will be discharged on continuous tube feeds.  Continuous tube feeds (Glucerna 1.2 Vidal, start at facility at 10mL per hour and increase every 4 hours to goal of 50ml per hour of continuous feeds.  Add Carlos x 2)    Patient has also had poor oral hygiene with plaque on the roof of her mouth and tongue.  She will require a dentist to examine her for her oral and physical health.    On day of discharge, patient is clinically stable with no new exam findings or acute symptoms compared to prior. The patient was seen by the attending physician on the date of discharge and deemed stable and acceptable for discharge. The patient's chronic medical conditions were treated accordingly per the patient's home medication regimen. The patient's medication reconciliation (with changes made to chronic medications), follow up appointments, discharge orders, instructions, and significant lab and diagnostic studies are as noted.     Discharge follow up action items:     1. Follow up with PCP in 1-2 weeks (coordinate care and re-evaluate tamoxifen). Follow up with dentist within 1 week  2. Follow up labs, path, & imaging:  N/A  3. Medication changes: Continuous tube feeds (Glucerna 1.2 Vidal, start at facility at 10mL per hour and increase every 4 hours to goal of 50ml per hour of continuous feeds.  Add Carlos x 2)   Patient is to follow up with the GI , PCP as oupt    Patient's ordered code status: FULL CODE    Patient disposition: Skilled nursing facility Discharge Summary     Admit Date: 12-20-23  Discharge Date: 1/17/2024    Admission diagnoses:     Hyperosmolality with hypernatremia    Discharge diagnoses:     Hypernatremia  Failure to Thrive  UTI     Hospital Course:   For full details, please see H&P, progress notes, consult notes and ancillary notes. Briefly, JONATHAN CHILD is a 68y Female with a history of significant for breast cancer s/p L mastectomy, TBI (s/p a fall ~2 years ago) c/b aphasia, meningioma, hypothyroidism (hx Grave's dz s/p radioactive iodine), CVA, and dysphagia who presents to [facility] ED on 12/20/2023 with no PO intake and increased lethargy in the last 2-3 days prior to admission.    Patient was admitted for hypernatremia and UTI. She was started on D5 w/ adequate improvement in sodium. Her urine culture grew E coli and was subsequently treated w/ a 3 day course of ceftriaxone. Hospital course was complicated by worsening mental status as well as RRT for hypoxia and hypotension. Suspect patient likely aspirated. She was initially placed on HFNC and now back on room air and at baseline. She completed a 5 day course of empiric abx for aspiration and UTI.  A NG tube was placed to provide nutrition. Speech and swallow evaluated patient at bedside and initially recommended puree/moderately thick liquids via 1/2 tsp amount only. However, there was concern that patient may not be getting adequate intake with this recommended diet.  was agreeable to try feeds even with risk of aspiration.  GI was consulted for a PEG tube placement, however  deferred PEG tube and requested an evaluation for reversible causes of dysphagia. MRI head negative. Patient did not significantly improve with tube feeds, and subsequently patient's  was agreeable to PEG tube.  Patient's status has remained unchanged since the PEG tube.  Patient will be discharged on continuous tube feeds.  Continuous tube feeds (Glucerna 1.2 Vidal, start at facility at 10mL per hour and increase every 4 hours to goal of 50ml per hour of continuous feeds.  Add Carlos x 2)    Patient has also had poor oral hygiene with plaque on the roof of her mouth and tongue.  She will require a dentist to examine her for her oral and physical health.    On day of discharge, patient is clinically stable with no new exam findings or acute symptoms compared to prior. The patient was seen by the attending physician on the date of discharge and deemed stable and acceptable for discharge. The patient's chronic medical conditions were treated accordingly per the patient's home medication regimen. The patient's medication reconciliation (with changes made to chronic medications), follow up appointments, discharge orders, instructions, and significant lab and diagnostic studies are as noted.     Discharge follow up action items:     1. Follow up with PCP in 1-2 weeks (coordinate care and re-evaluate tamoxifen). Follow up with dentist within 1 week  2. Follow up labs, path, & imaging:  N/A  3. Medication changes: Continuous tube feeds (Glucerna 1.2 Vidal, start at facility at 10mL per hour and increase every 4 hours to goal of 50ml per hour of continuous feeds.  Add Carlos x 2)   Patient is to follow up with the GI , PCP as oupt    Patient's ordered code status: FULL CODE    Patient disposition: Skilled nursing facility Discharge Summary     Admit Date: 12-20-23  Discharge Date: 1/17/2024    Admission diagnoses:     Hyperosmolality with hypernatremia    Discharge diagnoses:     Hypernatremia  Failure to Thrive  UTI     Hospital Course:   For full details, please see H&P, progress notes, consult notes and ancillary notes. Briefly, JONATHAN CHILD is a 68y Female with a history of significant for breast cancer s/p L mastectomy, TBI (s/p a fall ~2 years ago) c/b aphasia, meningioma, hypothyroidism (hx Grave's dz s/p radioactive iodine), CVA, and dysphagia who presents to [facility] ED on 12/20/2023 with no PO intake and increased lethargy in the last 2-3 days prior to admission.    Patient was admitted for hypernatremia and UTI. She was started on D5 w/ adequate improvement in sodium. Her urine culture grew E coli and was subsequently treated w/ a 3 day course of ceftriaxone. Hospital course was complicated by worsening mental status as well as RRT for hypoxia and hypotension. Suspect patient likely aspirated. She was initially placed on HFNC and now back on room air and at baseline. She completed a 5 day course of empiric abx for aspiration and UTI.  A NG tube was placed to provide nutrition. Speech and swallow evaluated patient at bedside and initially recommended puree/moderately thick liquids via 1/2 tsp amount only. However, there was concern that patient may not be getting adequate intake with this recommended diet.  was agreeable to try feeds even with risk of aspiration.  GI was consulted for a PEG tube placement, however  deferred PEG tube and requested an evaluation for reversible causes of dysphagia. MRI head negative. Patient did not significantly improve with tube feeds, and subsequently patient's  was agreeable to PEG tube.  Patient's status has remained unchanged since the PEG tube.  Patient will be discharged on continuous tube feeds.  Continuous tube feeds (Glucerna 1.2 Vidal, start at facility at 10mL per hour and increase every 4 hours to goal of 50ml per hour of continuous feeds.  Add Carlos x 2)    Patient has also had poor oral hygiene with plaque on the roof of her mouth and tongue.  She will require a dentist to examine her for her oral and physical health.    On day of discharge, patient is clinically stable with no new exam findings or acute symptoms compared to prior. The patient was seen by the attending physician on the date of discharge and deemed stable and acceptable for discharge. The patient's chronic medical conditions were treated accordingly per the patient's home medication regimen. The patient's medication reconciliation (with changes made to chronic medications), follow up appointments, discharge orders, instructions, and significant lab and diagnostic studies are as noted.     Discharge follow up action items:     1. Follow up with PCP in 1-2 weeks (coordinate care and re-evaluate tamoxifen). Follow up with dentist within 1 week  2. Follow up labs, path, & imaging:  N/A  3. Medication changes: Continuous tube feeds (Glucerna 1.2 Vidal, start at facility at 10mL per hour and increase every 4 hours to goal of 50ml per hour of continuous feeds.  Add Carlos x 2)   Patient is to follow up with the GI , PCP as oupt    Patient's ordered code status: FULL CODE    Patient disposition: Skilled nursing facility Discharge Summary     Admit Date: 12-20-23  Discharge Date: 1/17/2024    Admission diagnoses:     Hyperosmolality with hypernatremia    Discharge diagnoses:     Hypernatremia  Failure to Thrive  UTI     Hospital Course:   For full details, please see H&P, progress notes, consult notes and ancillary notes. Briefly, JONATHAN CHILD is a 68y Female with a history of significant for breast cancer s/p L mastectomy, TBI (s/p a fall ~2 years ago) c/b aphasia, meningioma, hypothyroidism (hx Grave's dz s/p radioactive iodine), CVA, and dysphagia who presents to [facility] ED on 12/20/2023 with no PO intake and increased lethargy in the last 2-3 days prior to admission.    Patient was admitted for hypernatremia and UTI. She was started on D5 w/ adequate improvement in sodium. Her urine culture grew E coli and was subsequently treated w/ a 3 day course of ceftriaxone. Hospital course was complicated by worsening mental status as well as RRT for hypoxia and hypotension. Suspect patient likely aspirated. She was initially placed on HFNC and now back on room air and at baseline. She completed a 5 day course of empiric abx for aspiration and UTI.  A NG tube was placed to provide nutrition. Speech and swallow evaluated patient at bedside and initially recommended puree/moderately thick liquids via 1/2 tsp amount only. However, there was concern that patient may not be getting adequate intake with this recommended diet.  was agreeable to try feeds even with risk of aspiration.  GI was consulted for a PEG tube placement, however  deferred PEG tube and requested an evaluation for reversible causes of dysphagia. MRI head negative. Patient did not significantly improve with tube feeds, and subsequently patient's  was agreeable to PEG tube.  Patient's status has remained unchanged since the PEG tube.  Patient will be discharged on continuous tube feeds.  Continuous tube feeds (Glucerna 1.2 Vidal, start at facility at 10mL per hour and increase every 4 hours to goal of 50ml per hour of continuous feeds.  Add Carlos x 2).    Later in hospital stay patient was found to be retaining urine.  Repeat Urinalysis negative for UTI.  Spangler placed, and patient will be discharged on Spangler.  Patient will need follow up with urology in 1-2 weeks after discharge.    Patient has also had poor oral hygiene with plaque on the roof of her mouth and tongue.  She will require a dentist to examine her for her oral and physical health.    On day of discharge, patient is clinically stable with no new exam findings or acute symptoms compared to prior. The patient was seen by the attending physician on the date of discharge and deemed stable and acceptable for discharge. The patient's chronic medical conditions were treated accordingly per the patient's home medication regimen. The patient's medication reconciliation (with changes made to chronic medications), follow up appointments, discharge orders, instructions, and significant lab and diagnostic studies are as noted.     Discharge follow up action items:     1. Follow up with PCP in 1-2 weeks (coordinate care and re-evaluate tamoxifen). Follow up with dentist within 1 week  Follow up with urology in 1-2 weeks for Spangler catheter management  2. Follow up labs, path, & imaging:  N/A  3. Medication changes: Continuous tube feeds (Glucerna 1.2 Vidal, start at facility at 10mL per hour and increase every 4 hours to goal of 50ml per hour of continuous feeds.  Add Carlos x 2)   Patient is to follow up with the GI , PCP as oupt    Patient's ordered code status: FULL CODE    Patient disposition: Skilled nursing facility Discharge Summary     Admit Date: 12-20-23  Discharge Date: 1/17/2024    Admission diagnoses:     Hyperosmolality with hypernatremia    Discharge diagnoses:     Hypernatremia  Failure to Thrive  UTI     Hospital Course:   For full details, please see H&P, progress notes, consult notes and ancillary notes. Briefly, JONATHAN CHILD is a 68y Female with a history of significant for breast cancer s/p L mastectomy, TBI (s/p a fall ~2 years ago) c/b aphasia, meningioma, hypothyroidism (hx Grave's dz s/p radioactive iodine), CVA, and dysphagia who presents to [facility] ED on 12/20/2023 with no PO intake and increased lethargy in the last 2-3 days prior to admission.    Patient was admitted for hypernatremia and UTI. She was started on D5 w/ adequate improvement in sodium. Her urine culture grew E coli and was subsequently treated w/ a 3 day course of ceftriaxone. Hospital course was complicated by worsening mental status as well as RRT for hypoxia and hypotension. Suspect patient likely aspirated. She was initially placed on HFNC and now back on room air and at baseline. She completed a 5 day course of empiric abx for aspiration and UTI.  A NG tube was placed to provide nutrition. Speech and swallow evaluated patient at bedside and initially recommended puree/moderately thick liquids via 1/2 tsp amount only. However, there was concern that patient may not be getting adequate intake with this recommended diet.  was agreeable to try feeds even with risk of aspiration.  GI was consulted for a PEG tube placement, however  deferred PEG tube and requested an evaluation for reversible causes of dysphagia. MRI head negative. Patient did not significantly improve with tube feeds, and subsequently patient's  was agreeable to PEG tube.  Patient's status has remained unchanged since the PEG tube.  Patient will be discharged on continuous tube feeds.  Continuous tube feeds (Glucerna 1.2 Vidal, start at facility at 10mL per hour and increase every 4 hours to goal of 50ml per hour of continuous feeds.  Add Carlos x 2).    Later in hospital stay patient was found to be retaining urine.  Repeat Urinalysis negative for UTI.  Spangler placed, and patient will be discharged on Spangler.  Patient will need follow up with urology in 1-2 weeks after discharge.    Patient has also had poor oral hygiene with plaque on the roof of her mouth and tongue.  She will require a dentist to examine her for her oral and physical health.    On day of discharge, patient is clinically stable with no new exam findings or acute symptoms compared to prior. The patient was seen by the attending physician on the date of discharge and deemed stable and acceptable for discharge. The patient's chronic medical conditions were treated accordingly per the patient's home medication regimen. The patient's medication reconciliation (with changes made to chronic medications), follow up appointments, discharge orders, instructions, and significant lab and diagnostic studies are as noted.     Discharge follow up action items:     1. Follow up with PCP in 1-2 weeks (coordinate care and re-evaluate tamoxifen). Follow up with dentist within 1 week  Follow up with urology in 1-2 weeks for Spangler catheter management  2. Follow up labs, path, & imaging:  N/A  3. Medication changes: Continuous tube feeds (Glucerna 1.2 Vidal, start at facility at 10mL per hour and increase every 4 hours to goal of 50ml per hour of continuous feeds.  Add Carlos x 2)   Patient is to follow up with the GI , PCP as oupt    Patient's ordered code status: FULL CODE    Patient disposition: Skilled nursing facility Discharge Summary     Admit Date: 12-20-23  Discharge Date: 1/17/2024    Admission diagnoses:     Hyperosmolality with hypernatremia    Discharge diagnoses:     Hypernatremia  Failure to Thrive  UTI     Hospital Course:   For full details, please see H&P, progress notes, consult notes and ancillary notes. Briefly, JONATHAN CHILD is a 68y Female with a history of significant for breast cancer s/p L mastectomy, TBI (s/p a fall ~2 years ago) c/b aphasia, meningioma, hypothyroidism (hx Grave's dz s/p radioactive iodine), CVA, and dysphagia who presents to [facility] ED on 12/20/2023 with no PO intake and increased lethargy in the last 2-3 days prior to admission.    Patient was admitted for hypernatremia and UTI. She was started on D5 w/ adequate improvement in sodium. Her urine culture grew E coli and was subsequently treated w/ a 3 day course of ceftriaxone. Hospital course was complicated by worsening mental status as well as RRT for hypoxia and hypotension. Suspect patient likely aspirated. She was initially placed on HFNC and now back on room air and at baseline. She completed a 5 day course of empiric abx for aspiration and UTI.  A NG tube was placed to provide nutrition. Speech and swallow evaluated patient at bedside and initially recommended puree/moderately thick liquids via 1/2 tsp amount only. However, there was concern that patient may not be getting adequate intake with this recommended diet.  was agreeable to try feeds even with risk of aspiration.  GI was consulted for a PEG tube placement, however  deferred PEG tube and requested an evaluation for reversible causes of dysphagia. MRI head negative. Patient did not significantly improve with tube feeds, and subsequently patient's  was agreeable to PEG tube.  Patient's status has remained unchanged since the PEG tube.  Patient will be discharged on continuous tube feeds.  Continuous tube feeds (Glucerna 1.2 Vidal, start at facility at 10mL per hour and increase every 4 hours to goal of 50ml per hour of continuous feeds.  Add Carlos x 2).    Later in hospital stay patient was found to be retaining urine.  Repeat Urinalysis negative for UTI.  Spangler placed, and patient will be discharged on Spangler.  Patient will need follow up with urology in 1-2 weeks after discharge.    Patient has also had poor oral hygiene with plaque on the roof of her mouth and tongue.  She will require a dentist to examine her for her oral and physical health.    On day of discharge, patient is clinically stable with no new exam findings or acute symptoms compared to prior. The patient was seen by the attending physician on the date of discharge and deemed stable and acceptable for discharge. The patient's chronic medical conditions were treated accordingly per the patient's home medication regimen. The patient's medication reconciliation (with changes made to chronic medications), follow up appointments, discharge orders, instructions, and significant lab and diagnostic studies are as noted.     Discharge follow up action items:     1. Follow up with PCP in 1-2 weeks (coordinate care and re-evaluate tamoxifen). Follow up with dentist within 1 week  Follow up with urology in 1-2 weeks for Spangler catheter management  2. Follow up labs, path, & imaging:  N/A  3. Medication changes: Continuous tube feeds (Glucerna 1.2 Vidal, start at facility at 10mL per hour and increase every 4 hours to goal of 50ml per hour of continuous feeds.  Add Carlos x 2)   Patient is to follow up with the GI , PCP as oupt    Patient's ordered code status: FULL CODE    Patient disposition: Skilled nursing facility

## 2023-12-22 NOTE — PROGRESS NOTE ADULT - NUTRITIONAL ASSESSMENT
This patient has been assessed with a concern for Malnutrition and has been determined to have a diagnosis/diagnoses of Severe protein-calorie malnutrition and Underweight (BMI < 19).    This patient is being managed with:   Diet NPO-  Entered: Dec 21 2023  1:52AM

## 2023-12-22 NOTE — DISCHARGE NOTE PROVIDER - NSDCCPTREATMENT_GEN_ALL_CORE_FT
PRINCIPAL PROCEDURE  Procedure: CT chest, abdomen and pelvis  Findings and Treatment: IMPRESSION: Bibasilar atelectasis. Trace perigastric stranding, which may   represent gastritis. No evidence of active intraluminal extravasation of   contrast.        SECONDARY PROCEDURE  Procedure: CT head  Findings and Treatment: IMPRESSION: No acute hemorrhage mass or mass effect is seen.      Procedure: MRI head  Findings and Treatment: IMPRESSION:  No recent ischemia, intracranial hemorrhages or enhancing mass lesions.     PRINCIPAL PROCEDURE  Procedure: Insertion, PEG tube  Findings and Treatment: A PEG tube was placed prior to your discharge.  Tube feeds were started and you tolerated them well.  It will be important to continue your tube feeds at your skilled nursing facility      SECONDARY PROCEDURE  Procedure: CT chest, abdomen and pelvis  Findings and Treatment: IMPRESSION: Bibasilar atelectasis. Trace perigastric stranding, which may represent gastritis. No evidence of active intraluminal extravasation of contrast.      Procedure: CT head  Findings and Treatment: IMPRESSION: No acute hemorrhage mass or mass effect is seen.    Procedure: MRI head  Findings and Treatment: IMPRESSION:  No recent ischemia, intracranial hemorrhages or enhancing mass lesions.

## 2023-12-22 NOTE — DISCHARGE NOTE PROVIDER - DETAILS OF MALNUTRITION DIAGNOSIS/DIAGNOSES
This patient has been assessed with a concern for Malnutrition and was treated during this hospitalization for the following Nutrition diagnosis/diagnoses:     -  12/21/2023: Severe protein-calorie malnutrition   -  12/21/2023: Underweight (BMI < 19)

## 2023-12-22 NOTE — CONSULT NOTE ADULT - SUBJECTIVE AND OBJECTIVE BOX
Wound SURGERY CONSULT NOTE    HPI:  68F with PMH significant for breast cancer s/p L mastectomy, TBI (s/p a fall ~2 years ago) c/b aphasia, meningioma, hypothyroidism (hx Grave's dz s/p radioactive iodine), CVA, and dysphagia who presents to [facility] ED on 12/20/2023 with no PO intake and increased lethargy in the last 2-3 days.    On encounter, pt is unable to communicate verbally, only opens eyes to voice. Unable to maintain attention or follow commands at this time. Tried contacting  (HCP), unable to reach at this time; called the son (Henok) to obtain collateral HPI. Per son, pt was verbal up to 2-3 days ago with prompting, usually able to answer Y/N questions or say her name. Pt was also able to tolerate thick liquids such as sauce. Approx 2-3 days ago, pt started not tolerating any PO intake, became significantly more lethargic at home, and was brought to the ED. Pt lives at home with , and is able to walk with a walker and a personal assistance. Reported to have relative left-sided weakness since the fall that led to TBI. Unable to assess ROS currently d/t encephalopathy/lethargy.    Vital signs stable with mild hypotension (T 36.6C, HR 78, BP 90/60, on RA, SpO2 >96%). Initial workup in the ED notable for borderline leukocytosis 10.87 with PMN predominance, significant hypernatremia 160 -> 166 (checking q6h), elevated procal 0.12, and positive UA (dark yellow, cloudy, +ketone, nitrite, 7 WBC, many bacteria). CXR with enlarged cardiomediastinal silhouette and L base atelectasis; no focal consolidations, pleural effusion, or pneumothorax. Lactate 1.3, negative RVP, and CTH negative for acute intracranial pathology (redemonstration of parenchymal volume loss + chronic microvascular changes).    Pt was given empiric IV CTX 1 g x1 and NS bolus 1L in the ED. Blood and urine cultures have been sent. (20 Dec 2023 21:57)        N/V/D,  BM/ Flatus,   NGT,     palp/ sob/dyspnea/ cp,       F/C/S  Wound consult requested by team to assist w/ management of      wound/ pressure injury.   Pt (unable to)  c/o pain, drainage, odor, color change,  or worsening swelling. Offloading and pericare initiated upon admission as pt Increasingly sedentary 2/2 to illness. Pt is Incontinent of urine & stool. (+)avila/ ostomy.   No h/o bites, scratches, falls, trauma.  Pt seen by Wound RN  CAVILON Advance/  Pili,TRIAD/ Aquacell/ medihoney/ Allevyn foam/ dakins/ Adaptic/ DSD recommended used at home/ while awaiting consult.  Appetite good/ decreased.  weight loss.  S&S / RD consult appreciated All questions asked and answered to pt's and family's expressed understanding and satisfaction.    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      C Section      Abnormality, eye  h/o left eye vitrectomy      S/P D&C (status post dilation and curettage)      H/O left mastectomy          REVIEW OF SYSTEMS: Pt unable to offer  General/ Breast/ Skin/Vasc/ Neuro/ MSK: see HPI  All other systems negative    MEDICATIONS  (STANDING):  cefTRIAXone   IVPB 1000 milliGRAM(s) IV Intermittent every 24 hours  enoxaparin Injectable 40 milliGRAM(s) SubCutaneous every 24 hours  levothyroxine Injectable 56 MICROGram(s) IV Push at bedtime    MEDICATIONS  (PRN):      Allergies    Tapazole (Hives)    Intolerances        SOCIAL HISTORY:  / /single/ ; (+)HHA/ lives in SNF; Former smoker, No current/ Denies smoking, ETOH, drugs    FAMILY HISTORY:  No pertinent family history in first degree relatives     no h/o PVD or wound healing or skin/ significant problems    PHYSICAL EXAM:  Vital Signs Last 24 Hrs  T(C): 36.6 (22 Dec 2023 11:48), Max: 37.2 (21 Dec 2023 23:58)  T(F): 97.8 (22 Dec 2023 11:48), Max: 98.9 (21 Dec 2023 23:58)  HR: 70 (22 Dec 2023 11:48) (67 - 78)  BP: 120/78 (22 Dec 2023 11:48) (106/69 - 122/80)  BP(mean): --  RR: 18 (22 Dec 2023 11:48) (18 - 19)  SpO2: 96% (22 Dec 2023 11:48) (90% - 100%)    Parameters below as of 22 Dec 2023 09:23  Patient On (Oxygen Delivery Method): room air        NAD, Guarded but stable,  A&Ox3/ Alert/ Confused  cachectic/ thin, MO/ Obese, frail,  WD/ WN/ WG,  Disheveled  Total Care Sport/ Versa Care P500 / Envella Progressa bed     HEENT:  NC/AT, PERRL, EOMI, sclera clear, mucosa moist, throat clear, trachea midline, neck supple, trach  Respiratory: nonlabored w/ equal chest rise  Gastrointestinal: soft NT/ND (+)BS  (+)PEG (+)ostomy (+)NGT  : (+)avila/ purewick/ condom cath  Neurology:  weakened strength & sensation grossly intact, paraesthesia  nonverbal, no follow commands, paraplegic  Psych: calm/ appropriate/ flat affect/ easily agitated/ restless/ anxious/ difficult to assess  Musculoskeletal:  limited stiff / p/FROM, no deformities/ contractures  Vascular: BLE equally warm/ cool,  no cyanosis, clubbing, edema nor acute ischemia           >LE //BLE edema equal           BLE DP/PT pulses palpable          BLE hemosiderin staining/ varicose veins  Skin:  moist w/ good turgor  thin, dry, pale, frail,  ecchymosis w/o hematoma  blistering  or serosanguinous drainage  No odor, erythema, increased warmth, tenderness, induration, fluctuance, nor crepitus    LABS/ CULTURES/ RADIOLOGY:                        11.3   9.02  )-----------( 146      ( 22 Dec 2023 07:09 )             37.3       161  |  123  |  16  ----------------------------<  81      [12-22-23 @ 07:08]  3.7   |  23  |  0.52        Ca     8.1     [12-22-23 @ 07:08]      Mg     2.2     [12-22-23 @ 07:08]      Phos  2.0     [12-22-23 @ 07:08]    TPro  5.6  /  Alb  2.8  /  TBili  0.6  /  DBili  x   /  AST  12  /  ALT  11  /  AlkPhos  71  [12-21-23 @ 10:19]            A1C with Estimated Average Glucose Result: 5.7 % (09-19-23 @ 10:33)        Culture - Blood (collected 12-20-23 @ 11:30)  Source: .Blood Blood-Peripheral  Preliminary Report (12-21-23 @ 17:01):    No growth at 24 hours    Culture - Blood (collected 12-20-23 @ 11:15)  Source: .Blood Blood-Peripheral  Preliminary Report (12-21-23 @ 17:01):    No growth at 24 hours                      A/P:    Wound Consult requested to assist w/ management of    BLE elevation & Compression  Consider AARON/PVR, Duplex, Xray, A/P BLE CT or MRI  Abx per Medicine/ ID  Moisturize intact skin w/ SWEEN cream BID  Nutrition Consult for optimization in pt w/ Severe Protein Calorie Malnutrition,        Inadequate PO intake, & Increased nutritional needs            encourage high quality protein, mor/ prosource, MVI & Vit C to promote wound healing  Hyperglycemia - improving w/ ADA diet and Lantus/ NPH & FS w/ ISS, consider Endo Consult, consider HgA1c  Anemia- improving w/ transfusions, Fe studies, protonix  Continue turning and positioning w/ offloading assistive devices as per protocol  Buttocks/ Sacrum Pili/ TRIAD BID /CAVILON ADVANCE TIW and prn soiling        Continue w/ attends under pads and Pericare w/ avila/ condom cath maintenance / purewick care as per protocol  Waffle Cushion to chair when oob to chair  Continue w/ low air loss pressure redistribution bed surface   Pt will need Group 2 mattress on hospital bed and ROHO cushion for wheel chair upon discharge home  Care as per medicine, will follow w/ you/ remain available as requested  Upon discharge f/u as outpatient at [facility] [address] [facility] [phone]  Seen w/ attng & RN and D/w team & RN  Thank you for this consult  Codi Thompson PA-C CWS [id number]  Nights/ Weekends/ Holidays please call:  General Surgery Consult pager ([phone]) for emergencies  Wound PT for multilayer leg wrapping or VAC issues (x [phone])   I spent 55minutes face to face w/ this pt of which more than 50% of the time was spent counseling & coordinating care of this pt.        Wound SURGERY CONSULT NOTE    HPI:  68F with PMH significant for breast cancer s/p L mastectomy, TBI (s/p a fall ~2 years ago) c/b aphasia, meningioma, hypothyroidism (hx Grave's dz s/p radioactive iodine), CVA, and dysphagia who presents to [facility] ED on 12/20/2023 with no PO intake and increased lethargy in the last 2-3 days.    On encounter, pt is unable to communicate verbally, only opens eyes to voice. Unable to maintain attention or follow commands at this time. Tried contacting  (HCP), unable to reach at this time; called the son (Henok) to obtain collateral HPI. Per son, pt was verbal up to 2-3 days ago with prompting, usually able to answer Y/N questions or say her name. Pt was also able to tolerate thick liquids such as sauce. Approx 2-3 days ago, pt started not tolerating any PO intake, became significantly more lethargic at home, and was brought to the ED. Pt lives at home with , and is able to walk with a walker and a personal assistance. Reported to have relative left-sided weakness since the fall that led to TBI. Unable to assess ROS currently d/t encephalopathy/lethargy.    Vital signs stable with mild hypotension (T 36.6C, HR 78, BP 90/60, on RA, SpO2 >96%). Initial workup in the ED notable for borderline leukocytosis 10.87 with PMN predominance, significant hypernatremia 160 -> 166 (checking q6h), elevated procal 0.12, and positive UA (dark yellow, cloudy, +ketone, nitrite, 7 WBC, many bacteria). CXR with enlarged cardiomediastinal silhouette and L base atelectasis; no focal consolidations, pleural effusion, or pneumothorax. Lactate 1.3, negative RVP, and CTH negative for acute intracranial pathology (redemonstration of parenchymal volume loss + chronic microvascular changes).    Pt was given empiric IV CTX 1 g x1 and NS bolus 1L in the ED. Blood and urine cultures have been sent. (20 Dec 2023 21:57)        N/V/D,  BM/ Flatus,   NGT,     palp/ sob/dyspnea/ cp,       F/C/S  Wound consult requested by team to assist w/ management of      wound/ pressure injury.   Pt (unable to)  c/o pain, drainage, odor, color change,  or worsening swelling. Offloading and pericare initiated upon admission as pt Increasingly sedentary 2/2 to illness. Pt is Incontinent of urine & stool. (+)avila/ ostomy.   No h/o bites, scratches, falls, trauma.  Pt seen by Wound RN  CAVILON Advance/  Pili,TRIAD/ Aquacell/ medihoney/ Allevyn foam/ dakins/ Adaptic/ DSD recommended used at home/ while awaiting consult.  Appetite good/ decreased.  weight loss.  S&S / RD consult appreciated All questions asked and answered to pt's and family's expressed understanding and satisfaction.    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      C Section      Abnormality, eye  h/o left eye vitrectomy      S/P D&C (status post dilation and curettage)      H/O left mastectomy          REVIEW OF SYSTEMS: Pt unable to offer  General/ Breast/ Skin/Vasc/ Neuro/ MSK: see HPI  All other systems negative    MEDICATIONS  (STANDING):  cefTRIAXone   IVPB 1000 milliGRAM(s) IV Intermittent every 24 hours  enoxaparin Injectable 40 milliGRAM(s) SubCutaneous every 24 hours  levothyroxine Injectable 56 MICROGram(s) IV Push at bedtime    MEDICATIONS  (PRN):      Allergies    Tapazole (Hives)    Intolerances        SOCIAL HISTORY:  / /single/ ; (+)HHA/ lives in SNF; Former smoker, No current/ Denies smoking, ETOH, drugs    FAMILY HISTORY:  No pertinent family history in first degree relatives     no h/o PVD or wound healing or skin/ significant problems    PHYSICAL EXAM:  Vital Signs Last 24 Hrs  T(C): 36.6 (22 Dec 2023 11:48), Max: 37.2 (21 Dec 2023 23:58)  T(F): 97.8 (22 Dec 2023 11:48), Max: 98.9 (21 Dec 2023 23:58)  HR: 70 (22 Dec 2023 11:48) (67 - 78)  BP: 120/78 (22 Dec 2023 11:48) (106/69 - 122/80)  BP(mean): --  RR: 18 (22 Dec 2023 11:48) (18 - 19)  SpO2: 96% (22 Dec 2023 11:48) (90% - 100%)    Parameters below as of 22 Dec 2023 09:23  Patient On (Oxygen Delivery Method): room air        NAD, Guarded but stable,  A&Ox3/ Alert/ Confused  cachectic/ thin, MO/ Obese, frail,  WD/ WN/ WG,  Disheveled  Total Care Sport/ Versa Care P500 / Envella Progressa bed     HEENT:  NC/AT, PERRL, EOMI, sclera clear, mucosa moist, throat clear, trachea midline, neck supple, trach  Respiratory: nonlabored w/ equal chest rise  Gastrointestinal: soft NT/ND (+)BS  (+)PEG (+)ostomy (+)NGT  : (+)avila/ purewick/ condom cath  Neurology:  weakened strength & sensation grossly intact, paraesthesia  nonverbal, no follow commands, paraplegic  Psych: calm/ appropriate/ flat affect/ easily agitated/ restless/ anxious/ difficult to assess  Musculoskeletal:  limited stiff / p/FROM, no deformities/ contractures  Vascular: BLE equally warm/ cool,  no cyanosis, clubbing, edema nor acute ischemia           >LE //BLE edema equal           BLE DP/PT pulses palpable          BLE hemosiderin staining/ varicose veins  Skin:  moist w/ good turgor  thin, dry, pale, frail,  ecchymosis w/o hematoma  blistering  or serosanguinous drainage  No odor, erythema, increased warmth, tenderness, induration, fluctuance, nor crepitus    LABS/ CULTURES/ RADIOLOGY:                        11.3   9.02  )-----------( 146      ( 22 Dec 2023 07:09 )             37.3       161  |  123  |  16  ----------------------------<  81      [12-22-23 @ 07:08]  3.7   |  23  |  0.52        Ca     8.1     [12-22-23 @ 07:08]      Mg     2.2     [12-22-23 @ 07:08]      Phos  2.0     [12-22-23 @ 07:08]    TPro  5.6  /  Alb  2.8  /  TBili  0.6  /  DBili  x   /  AST  12  /  ALT  11  /  AlkPhos  71  [12-21-23 @ 10:19]            A1C with Estimated Average Glucose Result: 5.7 % (09-19-23 @ 10:33)        Culture - Blood (collected 12-20-23 @ 11:30)  Source: .Blood Blood-Peripheral  Preliminary Report (12-21-23 @ 17:01):    No growth at 24 hours    Culture - Blood (collected 12-20-23 @ 11:15)  Source: .Blood Blood-Peripheral  Preliminary Report (12-21-23 @ 17:01):    No growth at 24 hours                      A/P:    Wound Consult requested to assist w/ management of    BLE elevation & Compression  Consider AARON/PVR, Duplex, Xray, A/P BLE CT or MRI  Abx per Medicine/ ID  Moisturize intact skin w/ SWEEN cream BID  Nutrition Consult for optimization in pt w/ Severe Protein Calorie Malnutrition,        Inadequate PO intake, & Increased nutritional needs            encourage high quality protein, mor/ prosource, MVI & Vit C to promote wound healing  Hyperglycemia - improving w/ ADA diet and Lantus/ NPH & FS w/ ISS, consider Endo Consult, consider HgA1c  Anemia- improving w/ transfusions, Fe studies, protonix  Continue turning and positioning w/ offloading assistive devices as per protocol  Buttocks/ Sacrum Pili/ TRIAD BID /CAVILON ADVANCE TIW and prn soiling        Continue w/ attends under pads and Pericare w/ avila/ condom cath maintenance / purewick care as per protocol  Waffle Cushion to chair when oob to chair  Continue w/ low air loss pressure redistribution bed surface   Pt will need Group 2 mattress on hospital bed and ROHO cushion for wheel chair upon discharge home  Care as per medicine, will follow w/ you/ remain available as requested  Upon discharge f/u as outpatient at Wound Center [address] [facility] [phone]  Seen w/ attng & RN and D/w team & RN  Thank you for this consult  Codi Thompson PA-C CWS [id number]  Nights/ Weekends/ Holidays please call:  General Surgery Consult pager ([phone]) for emergencies  Wound PT for multilayer leg wrapping or VAC issues (x [phone])   I spent 55minutes face to face w/ this pt of which more than 50% of the time was spent counseling & coordinating care of this pt.        Wound SURGERY CONSULT NOTE    HPI:  68F with PMH significant for breast cancer s/p L mastectomy, TBI (s/p a fall ~2 years ago) c/b aphasia, meningioma, hypothyroidism (hx Grave's dz s/p radioactive iodine), CVA, and dysphagia who presents to [facility] ED on 12/20/2023 with no PO intake and increased lethargy in the last 2-3 days.  pt is unable to communicate verbally, only opens eyes to voice. Unable to maintain attention or follow commands at this time. As per son, pt was verbal up to 2-3 days prior to admission with prompting, usually able to answer Y/N questions or say her name. Pt was also able to tolerate thick liquids such as sauce., pt started not tolerating any PO intake, became significantly more lethargic at home, and was brought to the ED. Pt lives at home with , and was able to walk with a walker and a personal assistance. Reported to have relative left-sided weakness since the fall that led to TBI. Unable to assess ROS currently d/t encephalopathy/lethargy.    Vital signs stable with mild hypotension (T 36.6C, HR 78, BP 90/60, on RA, SpO2 >96%). Initial workup in the ED notable for borderline leukocytosis 10.87 with PMN predominance, significant hypernatremia 160 -> 166 (checking q6h), elevated procal 0.12, and positive UA (dark yellow, cloudy, +ketone, nitrite, 7 WBC, many bacteria). CXR with enlarged cardiomediastinal silhouette and L base atelectasis; no focal consolidations, pleural effusion, or pneumothorax. Lactate 1.3, negative RVP, and CTH negative for acute intracranial pathology (redemonstration of parenchymal volume loss + chronic microvascular changes).    Pt was given empiric IV CTX 1 g x1 and NS bolus 1L in the ED. Blood and urine cultures have been sent.    Wound consult requested by team to assist w/ management of multiple pressure injuries.   Pt unable to c/o pain, drainage, odor, color change,  or worsening swelling. Offloading and pericare initiated upon admission as pt sedentary 2/2 to illness. Pt is Incontinent of urine & stool.  No new h/o bites, scratches, falls, trauma.  Pt seen by Wound RN recommended consult.  Appetite poor w/  weight loss.        Current Diet: Diet, NPO (12-21-23 @ 01:52)      PAST MEDICAL & SURGICAL HISTORY:  Hypothyroidism 2/2 Radio Iodine    Hyperthyroidism  1975- s/p radio active iodine RX    Breast cancer  s/p left mastectomy    SVT (supraventricular tachycardia)    SAH (subarachnoid hemorrhage)    Multiple falls    Salivary Gland Disease  s/p salivary gland tumor removed    s/p C Section    Abnormality, eye  s/p left eye vitrectomy    S/P D&C (status post dilation and curettage)      REVIEW OF SYSTEMS: Pt unable to offer      MEDICATIONS  (STANDING):  cefTRIAXone   IVPB 1000 milliGRAM(s) IV Intermittent every 24 hours  enoxaparin Injectable 40 milliGRAM(s) SubCutaneous every 24 hours  levothyroxine Injectable 56 MICROGram(s) IV Push at bedtime    Allergies  Tapazole (Hives)      SOCIAL HISTORY:  /, No current smoking, ETOH, drugs    FAMILY HISTORY:  No pertinent family history in first degree relatives      PHYSICAL EXAM:  Vital Signs Last 24 Hrs  T(C): 36.6 (22 Dec 2023 11:48), Max: 37.2 (21 Dec 2023 23:58)  T(F): 97.8 (22 Dec 2023 11:48), Max: 98.9 (21 Dec 2023 23:58)  HR: 70 (22 Dec 2023 11:48) (67 - 78)  BP: 120/78 (22 Dec 2023 11:48) (106/69 - 122/80)  BP(mean): --  RR: 18 (22 Dec 2023 11:48) (18 - 19)  SpO2: 96% (22 Dec 2023 11:48) (90% - 100%)    Parameters below as of 22 Dec 2023 09:23  Patient On (Oxygen Delivery Method): room air    NAD, lethargic, cachectic,, frail,  WD/ WN/  Disheveled  Versa Care P500  bed  HEENT:  NC/AT, sclera clear, mucosa moist, throat clear, trachea midline, neck supple  Respiratory: nonlabored w/ equal chest rise  Gastrointestinal: soft NT/ND   Neurology:  nonverbal, no follow commands, paraplegic  Psych: calm/ appropriate  Musculoskeletal: stiff pROM, w/ contractures  Vascular: BLE equally warn,  no cyanosis, clubbing, nor acute ischemia         BLE edema equal          BLE DP pulses palpable  Skin:  thin, dry, pale, frail,  ecchymosis w/o hematoma    left trochanter (HIP) unstageable pressure injury  3 cm x 3 cm x unknown cm      black dry  eschar s  left deltoid ( shoulder ) unstageable pressure injury    non-blanchable deep red discoloration and  hyperpigmentation     w/ black dry and moist  eschar    6 cm x 6  cm   Right trochanter (hip)  unstageable pressure injury  non-blanchable deep red and purple discoloration and  hyperpigmentation    12 cm x 10  cm    soft black eschar size    right buttock   unstageable pressure  injury   5 cm  x 5 cm    soft eschar and maroon /purple skin  sacrum unstageable pressure injury   deep red and purple discoloration and  hyperpigmentation     soft eschar  8 cm x 6  cm x unknown cm  on   thoracic spine there DTI   non- blanchable deep red/ maroon  discoloration and hyperpigmentation   5cm x 5cm   no blistering  or drainage  No odor, erythema, increased warmth, tenderness, induration, fluctuance, nor crepitus    LABS/ CULTURES/ RADIOLOGY:                        11.3   9.02  )-----------( 146      ( 22 Dec 2023 07:09 )             37.3       161  |  123  |  16  ----------------------------<  81      [12-22-23 @ 07:08]  3.7   |  23  |  0.52        Ca     8.1     [12-22-23 @ 07:08]      Mg     2.2     [12-22-23 @ 07:08]      Phos  2.0     [12-22-23 @ 07:08]    TPro  5.6  /  Alb  2.8  /  TBili  0.6  /  DBili  x   /  AST  12  /  ALT  11  /  AlkPhos  71  [12-21-23 @ 10:19]      A1C with Estimated Average Glucose Result: 5.7 % (09-19-23 @ 10:33)      Culture - Blood (collected 12-20-23 @ 11:30)  Source: .Blood Blood-Peripheral  Preliminary Report (12-21-23 @ 17:01):    No growth at 24 hours    Culture - Blood (collected 12-20-23 @ 11:15)  Source: .Blood Blood-Peripheral  Preliminary Report (12-21-23 @ 17:01):    No growth at 24 hours         Wound SURGERY CONSULT NOTE    HPI:  68F with PMH significant for breast cancer s/p L mastectomy, TBI (s/p a fall ~2 years ago) c/b aphasia, meningioma, hypothyroidism (hx Grave's dz s/p radioactive iodine), CVA, and dysphagia who presents to [facility] ED on 12/20/2023 with no PO intake and increased lethargy in the last 2-3 days.  pt is unable to communicate verbally, only opens eyes to voice. Unable to maintain attention or follow commands at this time. As per son, pt was verbal up to 2-3 days prior to admission with prompting, usually able to answer Y/N questions or say her name. Pt was also able to tolerate thick liquids such as sauce., pt started not tolerating any PO intake, became significantly more lethargic at home, and was brought to the ED. Pt lives at home with , and was able to walk with a walker and a personal assistance. Reported to have relative left-sided weakness since the fall that led to TBI. Unable to assess ROS currently d/t encephalopathy/lethargy.    Vital signs stable with mild hypotension (T 36.6C, HR 78, BP 90/60, on RA, SpO2 >96%). Initial workup in the ED notable for borderline leukocytosis 10.87 with PMN predominance, significant hypernatremia 160 -> 166 (checking q6h), elevated procal 0.12, and positive UA (dark yellow, cloudy, +ketone, nitrite, 7 WBC, many bacteria). CXR with enlarged cardiomediastinal silhouette and L base atelectasis; no focal consolidations, pleural effusion, or pneumothorax. Lactate 1.3, negative RVP, and CTH negative for acute intracranial pathology (redemonstration of parenchymal volume loss + chronic microvascular changes).    Pt was given empiric IV CTX 1 g x1 and NS bolus 1L in the ED. Blood and urine cultures have been sent.    Wound consult requested by team to assist w/ management of multiple pressure injuries.   Pt unable to c/o pain, drainage, odor, color change,  or worsening swelling. Offloading and pericare initiated upon admission as pt sedentary 2/2 to illness. Pt is Incontinent of urine & stool.  No new h/o bites, scratches, falls, trauma.  Pt seen by Wound RN recommended consult.  Appetite poor w/  weight loss.        Current Diet: Diet, NPO (12-21-23 @ 01:52)      PAST MEDICAL & SURGICAL HISTORY:  Hypothyroidism 2/2 Radio Iodine    Hyperthyroidism  1975- s/p radio active iodine RX    Breast cancer  s/p left mastectomy    SVT (supraventricular tachycardia)    SAH (subarachnoid hemorrhage)    Multiple falls    Salivary Gland Disease  s/p salivary gland tumor removed    s/p C Section    Abnormality, eye  s/p left eye vitrectomy    S/P D&C (status post dilation and curettage)      REVIEW OF SYSTEMS: Pt unable to offer      MEDICATIONS  (STANDING):  cefTRIAXone   IVPB 1000 milliGRAM(s) IV Intermittent every 24 hours  enoxaparin Injectable 40 milliGRAM(s) SubCutaneous every 24 hours  levothyroxine Injectable 56 MICROGram(s) IV Push at bedtime    Allergies  Tapazole (Hives)      SOCIAL HISTORY:  /, No current smoking, ETOH, drugs    FAMILY HISTORY:  No pertinent family history in first degree relatives      PHYSICAL EXAM:  Vital Signs Last 24 Hrs  T(C): 36.6 (22 Dec 2023 11:48), Max: 37.2 (21 Dec 2023 23:58)  T(F): 97.8 (22 Dec 2023 11:48), Max: 98.9 (21 Dec 2023 23:58)  HR: 70 (22 Dec 2023 11:48) (67 - 78)  BP: 120/78 (22 Dec 2023 11:48) (106/69 - 122/80)  BP(mean): --  RR: 18 (22 Dec 2023 11:48) (18 - 19)  SpO2: 96% (22 Dec 2023 11:48) (90% - 100%)    Parameters below as of 22 Dec 2023 09:23  Patient On (Oxygen Delivery Method): room air    NAD, lethargic, cachectic,, frail,  WD/ WN/  Disheveled  Versa Care P500  bed  HEENT:  NC/AT, sclera clear, mucosa moist, throat clear, trachea midline, neck supple  Respiratory: nonlabored w/ equal chest rise  Gastrointestinal: soft NT/ND   Neurology:  nonverbal, no follow commands, paraplegic  Psych: calm/ appropriate  Musculoskeletal: stiff pROM, w/ contractures  Vascular: BLE equally warn,  no cyanosis, clubbing, nor acute ischemia         BLE edema equal          BLE DP pulses palpable  Skin:  thin, dry, pale, frail,  ecchymosis w/o hematoma    left trochanter (HIP) unstageable pressure injury  3 cm x 3 cm x unknown cm      black dry  eschar s  left deltoid ( shoulder ) unstageable pressure injury    non-blanchable deep red discoloration and  hyperpigmentation     w/ black dry and moist  eschar    6 cm x 6  cm   Right trochanter (hip)  unstageable pressure injury  non-blanchable deep red and purple discoloration and  hyperpigmentation    12 cm x 10  cm    soft black eschar size    right buttock   unstageable pressure  injury   5 cm  x 5 cm    soft eschar and maroon /purple skin  sacrum unstageable pressure injury   deep red and purple discoloration and  hyperpigmentation     soft eschar  8 cm x 6  cm x unknown cm  on   thoracic spine there DTI   non- blanchable deep red/ maroon  discoloration and hyperpigmentation   5cm x 5cm   no blistering  or drainage  No odor, erythema, increased warmth, tenderness, induration, fluctuance, nor crepitus    LABS/ CULTURES/ RADIOLOGY:                        11.3   9.02  )-----------( 146      ( 22 Dec 2023 07:09 )             37.3       161  |  123  |  16  ----------------------------<  81      [12-22-23 @ 07:08]  3.7   |  23  |  0.52        Ca     8.1     [12-22-23 @ 07:08]      Mg     2.2     [12-22-23 @ 07:08]      Phos  2.0     [12-22-23 @ 07:08]    TPro  5.6  /  Alb  2.8  /  TBili  0.6  /  DBili  x   /  AST  12  /  ALT  11  /  AlkPhos  71  [12-21-23 @ 10:19]      A1C with Estimated Average Glucose Result: 5.7 % (09-19-23 @ 10:33)      Culture - Blood (collected 12-20-23 @ 11:30)  Source: .Blood Blood-Peripheral  Preliminary Report (12-21-23 @ 17:01):    No growth at 24 hours    Culture - Blood (collected 12-20-23 @ 11:15)  Source: .Blood Blood-Peripheral  Preliminary Report (12-21-23 @ 17:01):    No growth at 24 hours

## 2023-12-22 NOTE — CONSULT NOTE ADULT - ASSESSMENT
68F with PMH significant for breast cancer s/p L mastectomy, TBI (s/p a fall ~2 years ago) c/b aphasia, meningioma, hypothyroidism (hx Grave's dz s/p radioactive iodine), CVA, and dysphagia who presents to [facility] ED on 12/20/2023 with no PO intake and increased lethargy in the last 2-3 days. Found to have significant hypernatremia to 166 (checking q6h), positive UA with borderline leukocytosis with PMN predominance. Pending culture results. RVP and CTH negative. Afebrile, on RA, hypotensive to 90/60 s/p 1L IVF bolus. Physical exam notable for encephalopathy AOx0 and non-verbal, cachexia and b/l upper/lower ext contracture, and multiple pressure ulcers of varying stages (I-III).      Wound Consult requested to assist w/ management of Hip & Spine DTI  Sacral/ Buttocks /Bilateral hip/ Shoulder Unstageable pressure injury  Incontinence Associated Dermatitis      Buttocks/ Sacrum/ Hips TRIAD BI and prn soiling        Continue w/ attends under pads and Pericare as per protocol  Lt shoulder- Allevyn QD  Abx per Medicine  Moisturize intact skin w/ SWEEN cream BID  Nutrition Consult for optimization         encourage high quality protein, mor/ prosource, MVI & Vit C to promote wound healing  Continue turning and positioning w/ offloading assistive devices as per protocol  Waffle Cushion to chair when oob to chair  Continue w/ low air loss pressure redistribution bed surface   Pt will need Group 2 mattress on hospital bed and ROHO cushion for wheel chair upon discharge home  Care as per medicine, will follow w/ you  Upon discharge f/u as outpatient at [facility] [address] [place] [address] [phone]  D/w team & RN & attng  Thank you for this consult  Codi Thompson PA-C CWS [id number]  Nights/ Weekends/ Holidays please call:  General Surgery Consult pager [phone]) for emergencies  Wound PT for multilayer leg wrapping or VAC issues (x [phone])   I spent 55minutes face to face w/ this pt of which more than 50% of the time was spent counseling & coordinating care of this pt.      68F with PMH significant for breast cancer s/p L mastectomy, TBI (s/p a fall ~2 years ago) c/b aphasia, meningioma, hypothyroidism (hx Grave's dz s/p radioactive iodine), CVA, and dysphagia who presents to [facility] ED on 12/20/2023 with no PO intake and increased lethargy in the last 2-3 days. Found to have significant hypernatremia to 166 (checking q6h), positive UA with borderline leukocytosis with PMN predominance. Pending culture results. RVP and CTH negative. Afebrile, on RA, hypotensive to 90/60 s/p 1L IVF bolus. Physical exam notable for encephalopathy AOx0 and non-verbal, cachexia and b/l upper/lower ext contracture, and multiple pressure ulcers of varying stages (I-III).      Wound Consult requested to assist w/ management of Hip & Spine DTI  Sacral/ Buttocks /Bilateral hip/ Shoulder Unstageable pressure injury  Incontinence Associated Dermatitis      Buttocks/ Sacrum/ Hips TRIAD BI and prn soiling        Continue w/ attends under pads and Pericare as per protocol  Lt shoulder- Allevyn QD  Abx per Medicine  Moisturize intact skin w/ SWEEN cream BID  Nutrition Consult for optimization         encourage high quality protein, mor/ prosource, MVI & Vit C to promote wound healing  Continue turning and positioning w/ offloading assistive devices as per protocol  Waffle Cushion to chair when oob to chair  Continue w/ low air loss pressure redistribution bed surface   Pt will need Group 2 mattress on hospital bed and ROHO cushion for wheel chair upon discharge home  Care as per medicine, will follow w/ you  Upon discharge f/u as outpatient at [facility] [address] [place] [address] [phone]  D/w team & RN & attng  Thank you for this consult  Codi Thompson PA-C CWS [id number]  Nights/ Weekends/ Holidays please call:  General Surgery Consult pager [phone]) for emergencies  Wound PT for multilayer leg wrapping or VAC issues (x [phone])   I spent 55minutes face to face w/ this pt of which more than 50% of the time was spent counseling & coordinating care of this pt.

## 2023-12-22 NOTE — PROGRESS NOTE ADULT - ATTENDING COMMENTS
69yo F PMH breast cancer s/p L mastectomy, TBI (s/p a fall ~2 years ago) c/b aphasia, meningioma, hypothyroidism (hx Grave's dz s/p radioactive iodine), CVA, and dysphagia, admission 9/19-9/23/23 for hypernatremia presents to [facility] ED on 12/20/2023 with no PO intake and increased lethargy found to have metabolic encephalopathy due to significant hypernatremia to 166 (checking q6h) and possible UTI, guarded prognosis with malnutrition and multiple pressure ulcers, palliative c/s pending.    1. Acute metabolic encephalopathy: P/w progressively worsening lethargy/encephalopathy i/s/o no PO intake for 2-3 days; at baseline, answers simple questions (Y/N, name) per report. Most likely ddx include metabolic (hypernatremia) vs infection (c/f UTI) vs mixed.  CT head with No acute hemorrhage mass or mass effect is seen. Currently AOx0, non-verbal, opens eyes to voice, unable to maintain attention or follow commands  - Manage chronic hypernatremia as below and UTI as below  -aspiration precautions    2. Hypernatremia: with ~2 L free water deficit.  Initial concern for rapid overcorrection 166 to 154.  However AM Na 161.  Appears 1/4NS which was initially working may have been held for 3-4 hours overnight by flowsheet.  - Target lowering the serum Na by 10 mEq/L in 24h  -resume D5W@50cc/hour and repeat BMP at 3pm    3. UTI: f/u urine culture.  Continue empiric abx with ceftriaxone day 2 of 3    4. Severe malnutrition: unable to participate in speech/swallow eval at this time. I spoke with /reported HCP on phone: he says a feeding tube is in line with her wishes.  Discussed she may not tolerate NGT but he thinks contractures may prevent her from pulling it out.  Can attempt NGT placement and nutrition c/s    5.Pressure ulcer: Multiple pressure ulcers noted on physical exam with varying stages (I-III), some with peeling and discoloration, possible tissue loss  - Son reports pt using Santyl topical at home  - Consult Wound Care.    6. H/o breast ca: s/p L mastectomy  - Holding home Tamoxifen (NPO).    7. Hypothyroid: Hx of Grave's disease s/p radioactive iodine  - Continue home levothyroxine in IV ( mcg --> IV 56 mcg QD)  - TSH wnl    8.  PPX: lovenox    9. Dispo: Per discussion with  Silverio, he wants feeding tubes and states that would be within her wishes.  He is agreeable to palliative c/s as I'm concerned about her guarded prognosis, 2nd admission with profound hypernatremia, h/o traumatic SAH and longterm outlook.    contact: TEAMS . 67yo F PMH breast cancer s/p L mastectomy, TBI (s/p a fall ~2 years ago) c/b aphasia, meningioma, hypothyroidism (hx Grave's dz s/p radioactive iodine), CVA, and dysphagia, admission 9/19-9/23/23 for hypernatremia presents to [facility] ED on 12/20/2023 with no PO intake and increased lethargy found to have metabolic encephalopathy due to significant hypernatremia to 166 (checking q6h) and possible UTI, guarded prognosis with malnutrition and multiple pressure ulcers, palliative c/s pending.    1. Acute metabolic encephalopathy: P/w progressively worsening lethargy/encephalopathy i/s/o no PO intake for 2-3 days; at baseline, answers simple questions (Y/N, name) per report. Most likely ddx include metabolic (hypernatremia) vs infection (c/f UTI) vs mixed.  CT head with No acute hemorrhage mass or mass effect is seen. Currently AOx0, non-verbal, opens eyes to voice, unable to maintain attention or follow commands  - Manage chronic hypernatremia as below and UTI as below  -aspiration precautions    2. Hypernatremia: with ~2 L free water deficit.  Initial concern for rapid overcorrection 166 to 154.  However AM Na 161.  Appears 1/4NS which was initially working may have been held for 3-4 hours overnight by flowsheet.  - Target lowering the serum Na by 10 mEq/L in 24h  -resume D5W@50cc/hour and repeat BMP at 3pm    3. UTI: f/u urine culture.  Continue empiric abx with ceftriaxone day 2 of 3    4. Severe malnutrition: unable to participate in speech/swallow eval at this time. I spoke with /reported HCP on phone: he says a feeding tube is in line with her wishes.  Discussed she may not tolerate NGT but he thinks contractures may prevent her from pulling it out.  Can attempt NGT placement and nutrition c/s    5.Pressure ulcer: Multiple pressure ulcers noted on physical exam with varying stages (I-III), some with peeling and discoloration, possible tissue loss  - Son reports pt using Santyl topical at home  - Consult Wound Care.    6. H/o breast ca: s/p L mastectomy  - Holding home Tamoxifen (NPO).    7. Hypothyroid: Hx of Grave's disease s/p radioactive iodine  - Continue home levothyroxine in IV ( mcg --> IV 56 mcg QD)  - TSH wnl    8.  PPX: lovenox    9. Dispo: Per discussion with  Silverio, he wants feeding tubes and states that would be within her wishes.  He is agreeable to palliative c/s as I'm concerned about her guarded prognosis, 2nd admission with profound hypernatremia, h/o traumatic SAH and longterm outlook.    contact: TEAMS .

## 2023-12-22 NOTE — DISCHARGE NOTE PROVIDER - NSFOLLOWUPCLINICSTOKEN_GEN_ALL_ED_FT
575612:1 week|| ||00\01||False; 841350:1 week|| ||00\01||False; 446788:1 week|| ||00\01||False; 825217:1 week|| ||00\01||False;731420:2 weeks|| ||00\01||False; 490601:1 week|| ||00\01||False;368952:2 weeks|| ||00\01||False; 529157:1 week|| ||00\01||False;976185:2 weeks|| ||00\01||False;

## 2023-12-22 NOTE — DISCHARGE NOTE PROVIDER - INSTRUCTIONS
Continuous tube feeds (Glucerna 1.2 Vidal, start at facility at 10mL per hour and increase every 4 hours to goal of 50ml per hour of continuous feeds.  Add Carlos x 2)

## 2023-12-22 NOTE — DISCHARGE NOTE PROVIDER - NSDCCAREPROVSEEN_GEN_ALL_CORE_FT
[facility] Team 5  [facility] Team 5  [facility] Team 5  [facility] Team 5   Dr Brown [facility] Team 5   Dr Brown [facility] Team 5   Dr Brown

## 2023-12-22 NOTE — DISCHARGE NOTE PROVIDER - NSFOLLOWUPCLINICS_GEN_ALL_ED_FT
[facility]  Dentistry  .  [place] [address]  Phone: [phone]  Fax:   Follow Up Time: 1 week     [facility]  Dentistry  .  [place] [address]  Phone: [phone]  Fax:   Follow Up Time: 1 week     [facility]  Dentistry  .  [place] [address]  Phone: [phone]  Fax:   Follow Up Time: 1 week     [facility]  Dentistry  .  [place] [address]  Phone: [phone]  Fax:   Follow Up Time: 1 week    [facility]  Urology  [address] [place] [address] [place] [address]  Phone: [phone]  Fax:   Follow Up Time: 2 weeks     [facility]  Dentistry  .  [place] [address]  Phone: [phone]  Fax:   Follow Up Time: 1 week    [facility]  Urology  [address] [place] [address] [place] [address]  Phone: [phone]  Fax:   Follow Up Time: 2 weeks     [facility]  Dentistry  .  [place] [address]  Phone: [phone]  Fax:   Follow Up Time: 1 week    [facility]  Urology  [address] [place] [address] [place] [address]  Phone: [phone]  Fax:   Follow Up Time: 2 weeks

## 2023-12-22 NOTE — PROGRESS NOTE ADULT - PROBLEM SELECTOR PLAN 7
Hx of Grave's disease s/p radioactive iodine  - Continue home levothyroxine in IV ( mcg --> IV 56 mcg QD)  - F/u TSH with AM labs Hx of Grave's disease s/p radioactive iodine  - Continue home levothyroxine in IV ( mcg --> IV 56 mcg QD)

## 2023-12-22 NOTE — DISCHARGE NOTE PROVIDER - NSDCMRMEDTOKEN_GEN_ALL_CORE_FT
folic acid 1 mg oral tablet: 1 tab(s) orally once a day  levothyroxine 112 mcg (0.112 mg) oral tablet: 1 tab(s) orally once a day  Multiple Vitamins oral tablet: 1 tab(s) orally once a day  Santyl 250 units/g topical ointment: Apply topically to affected area once a day  sertraline 25 mg oral tablet: 1 tab(s) orally once a day  tamoxifen 20 mg oral tablet: 1 tab(s) orally once a day   ascorbic acid 500 mg oral tablet: 1 tab(s) orally once a day  folic acid 1 mg oral tablet: 1 tab(s) orally once a day  levothyroxine 112 mcg (0.112 mg) oral tablet: 1 tab(s) orally once a day  Multiple Vitamins oral tablet: 1 tab(s) orally once a day  polyethylene glycol 3350 oral powder for reconstitution: 17 gram(s) orally once a day  Santyl 250 units/g topical ointment: Apply topically to affected area once a day  senna leaf extract oral tablet: 2 tab(s) orally once a day (at bedtime)  sertraline 25 mg oral tablet: 1 tab(s) orally once a day  thiamine 100 mg oral tablet: 1 tab(s) orally once a day   ascorbic acid 500 mg oral tablet: 1 tab(s) orally once a day  folic acid 1 mg oral tablet: 1 tab(s) orally once a day  levothyroxine 112 mcg (0.112 mg) oral tablet: 1 tab(s) orally once a day  Multiple Vitamins oral tablet: 1 tab(s) orally once a day  polyethylene glycol 3350 oral powder for reconstitution: 17 gram(s) orally once a day  Santyl 250 units/g topical ointment: Apply topically to affected area once a day  senna leaf extract oral tablet: 2 tab(s) orally once a day (at bedtime)  sertraline 25 mg oral tablet: 1 tab(s) orally once a day  tamoxifen 20 mg oral tablet: 1 cap(s) orally once a day  thiamine 100 mg oral tablet: 1 tab(s) orally once a day

## 2023-12-22 NOTE — DISCHARGE NOTE PROVIDER - NSDCFUADDAPPT_GEN_ALL_CORE_FT
APPTS ARE READY TO BE MADE: [ ] YES    Best Family or Patient Contact (if needed):    Additional Information about above appointments (if needed):    1:   2:   3:     Other comments or requests:    APPTS ARE READY TO BE MADE: [X] YES    Best Family or Patient Contact (if needed): Dr Silverio Hendricks [phone]    Additional Information about above appointments (if needed):    1: Appointments may need to be visits at skilled nursing facility  2:   3:     Other comments or requests:    APPTS ARE READY TO BE MADE: [X] YES    Best Family or Patient Contact (if needed): Dr Silverio Hendricks [phone]    Additional Information about above appointments (if needed):    1: Appointments may need to be visits at skilled nursing facility  2:   3:     Other comments or requests:    APPTS ARE READY TO BE MADE: [X] YES    Best Family or Patient Contact (if needed): Dr Silverio Hendricks [phone]    Additional Information about above appointments (if needed):    1: Appointments may need to be visits at skilled nursing facility  2:   3:     Other comments or requests:    APPTS ARE READY TO BE MADE: [X] YES    Best Family or Patient Contact (if needed): Dr Silverio Hendricks [phone]    Additional Information about above appointments (if needed):    1: Appointments may need to be visits at skilled nursing facility  2:   3:     Other comments or requests:   Patient is being discharged to rehab/hospice.  APPTS ARE READY TO BE MADE: [X] YES    Best Family or Patient Contact (if needed): Dr Silverio Hendricks [phone]    Additional Information about above appointments (if needed):    1: Appointments may need to be visits at skilled nursing facility  2:   3:     Other comments or requests:   Patient is being discharged to rehab/hospice.  APPTS ARE READY TO BE MADE: [X] YES    Best Family or Patient Contact (if needed): Dr Silverio Hendricks [phone]    Additional Information about above appointments (if needed):    1: Appointments may need to be visits at skilled nursing facility  2:   3:     Other comments or requests:   Patient is being discharged to rehab/hospice.

## 2023-12-22 NOTE — DISCHARGE NOTE PROVIDER - NSDCCPCAREPLAN_GEN_ALL_CORE_FT
PRINCIPAL DISCHARGE DIAGNOSIS  Diagnosis: Severe malnutrition  Assessment and Plan of Treatment:       SECONDARY DISCHARGE DIAGNOSES  Diagnosis: Acute UTI  Assessment and Plan of Treatment:      PRINCIPAL DISCHARGE DIAGNOSIS  Diagnosis: Severe malnutrition  Assessment and Plan of Treatment: You presented to the hospital for increased lethargy and change in mental status. You were found to have elevated sodium levels, likely in setting of poor oral intake. You were started on fluids and your sodium improved appropriately. Speech and swallow evaluated you and initially recommended a pureed/moderately thick liquid in 1/2 teaspoon amounts only. However, there was concern that you may not be getting enough calories. An NG tube was placed as a temporary measure to provide nutrition. The gastroenterologists also evaluated you for a feeding tube (PEG) placement.   If you develop any acute change in mental status, lethargy or confusion, please seek medical attention.      SECONDARY DISCHARGE DIAGNOSES  Diagnosis: Acute UTI  Assessment and Plan of Treatment: You were found to have a urinary tract infection. Urine culture grew E coli bacteria. You were treated with a 3 day course of antibiotic. Repeat urinalysis showed possibly a continued infection and thus was restarted on antibiotics for another 5 days.    Diagnosis: Acute hypoxemic respiratory failure  Assessment and Plan of Treatment: During your hospital stay, you were found to be short of breath. It is likely that you had an aspiration event. You required extra oxygen support for awhille and was started on antibiotics. You are now back on room air.     PRINCIPAL DISCHARGE DIAGNOSIS  Diagnosis: Severe malnutrition  Assessment and Plan of Treatment: You presented to the hospital for increased lethargy and change in mental status. You were found to have elevated sodium levels, likely in setting of poor oral intake. You were started on fluids and your sodium improved appropriately. Speech and swallow evaluated you and initially recommended a pureed/moderately thick liquid in teaspoon amounts only. However, there was concern that you may not be getting enough calories. An NG tube was placed as a temporary measure to provide nutrition. The gastroenterologists also evaluated you for a feeding tube (PEG) placement.   If you develop any acute change in mental status, lethargy or confusion, please seek medical attention.      SECONDARY DISCHARGE DIAGNOSES  Diagnosis: Acute UTI  Assessment and Plan of Treatment: You were found to have a urinary tract infection. Urine culture grew E coli bacteria. You were treated with a 3 day course of antibiotic. Repeat urinalysis showed possibly a continued infection and thus was restarted on antibiotics for another 5 days.    Diagnosis: Acute hypoxemic respiratory failure  Assessment and Plan of Treatment: During your hospital stay, you were found to be short of breath. It is likely that you had an aspiration event. You required extra oxygen support for awhille and was started on antibiotics. You are now back on room air.     PRINCIPAL DISCHARGE DIAGNOSIS  Diagnosis: Severe malnutrition  Assessment and Plan of Treatment: You presented to the hospital for increased lethargy and change in mental status. You were found to have elevated sodium levels, likely in setting of poor oral intake. You were started on fluids and your sodium improved appropriately. Speech and swallow evaluated you and initially recommended a pureed/moderately thick liquid in teaspoon amounts only. However, there was concern that you may not be getting enough calories. An NG tube was placed as a temporary measure to provide nutrition. The gastroenterologists also evaluated you for a feeding tube (PEG) placement and placed a PEG prior to discharge.  You were placed on continuous tube feeds (Glucerna 1.2 Vidal, started at 10mL per hour and increase every 4 hours to goal of 50ml per hour of continuous feeds.  Add Carlos x 2).  It will be important to follow up with your primary care doctor in 1-2 weeks to coordinate your care.  If you develop any acute change in mental status, lethargy or confusion, please seek medical attention.      SECONDARY DISCHARGE DIAGNOSES  Diagnosis: Acute UTI  Assessment and Plan of Treatment: You were found to have a urinary tract infection. Urine culture grew E coli bacteria. You were treated with a 3 day course of antibiotic. Repeat urinalysis showed possibly a continued infection and thus was restarted on antibiotics for another 5 days.  Please call a doctor or go to the ER right away if:  - You develop a fever  - You develop lower back or flank pain  - You develop burning when you urinate  - You develop confusion or hallucinations    Diagnosis: Acute hypoxemic respiratory failure  Assessment and Plan of Treatment: During your hospital stay, you were found to be short of breath. It is likely that you had an aspiration event. You required extra oxygen support for awhille and was started on antibiotics. You are now back on room air.    Diagnosis: Poor oral hygiene  Assessment and Plan of Treatment: You were found to have plaque on the roof of your mouth and on your tongue.  It has been difficult to clean off in the hospital in spite of aggressive cleaning measures.  A dentist was called who recommended that you follow up with a dentist outpatient for possible cleaning under sedation.  It will be important that you follow up with a dentist within 1-2 weeks of discharge.     PRINCIPAL DISCHARGE DIAGNOSIS  Diagnosis: Severe malnutrition  Assessment and Plan of Treatment: You presented to the hospital for increased lethargy and change in mental status. You were found to have elevated sodium levels, likely in setting of poor oral intake. You were started on fluids and your sodium improved appropriately. Speech and swallow evaluated you and initially recommended a pureed/moderately thick liquid in teaspoon amounts only. However, there was concern that you may not be getting enough calories. An NG tube was placed as a temporary measure to provide nutrition. The gastroenterologists also evaluated you for a feeding tube (PEG) placement and placed a PEG prior to discharge.  You were placed on continuous tube feeds (Glucerna 1.2 Vidal, started at 10mL per hour and increase every 4 hours to goal of 50ml per hour of continuous feeds.  Add Carlos x 2).  It will be important to follow up with your primary care doctor in 1-2 weeks to coordinate your care.  If you develop any acute change in mental status, lethargy or confusion, please seek medical attention.      SECONDARY DISCHARGE DIAGNOSES  Diagnosis: Acute UTI  Assessment and Plan of Treatment: You were found to have a urinary tract infection. Urine culture grew E coli bacteria. You were treated with a 3 day course of antibiotic. Repeat urinalysis showed possibly a continued infection and thus was restarted on antibiotics for another 5 days.  Please call a doctor or go to the ER right away if:  - You develop a fever  - You develop lower back or flank pain  - You develop burning when you urinate  - You develop confusion or hallucinations    Diagnosis: Acute hypoxemic respiratory failure  Assessment and Plan of Treatment: During your hospital stay, you were found to be short of breath. It is likely that you had an aspiration event. You required extra oxygen support for awhille and was started on antibiotics. You are now back on room air.    Diagnosis: Poor oral hygiene  Assessment and Plan of Treatment: You were found to have plaque on the roof of your mouth and on your tongue.  It has been difficult to clean off in the hospital in spite of aggressive cleaning measures.  A dentist was called who recommended that you follow up with a dentist outpatient for possible cleaning under sedation.  It will be important that you follow up with a dentist within 1-2 weeks of discharge.    Diagnosis: Urine retention  Assessment and Plan of Treatment: Later in your hospital course you were found to have urinary retention.  You do not have a urinary tract infection.  It is possible that this was impacting your blood pressure due to discomfort.  You needed to be discharged with a Spangler catheter.  It will be important to follow up with a urologist in 1-2 weeks for evaluation and removal/replacement of the Spangler cather.

## 2023-12-22 NOTE — PROGRESS NOTE ADULT - PROBLEM SELECTOR PLAN 4
Hx of recurrent UTI (last admission 9/2023)  - Positive UA, s/p IV CTX 1 mg x1 in ED  - Afebrile, hypotensive but HDS  - Continue IV CTX 1 mg QD for empiric UTI treatment, pending urine cultures to narrow abx spectrum Hx of recurrent UTI (last admission 9/2023). Recent urine culture showing 100k Ecoli.   - Positive UA, s/p IV CTX 1 mg x1 in ED  - Afebrile, hypotensive but HDS  - Continue IV CTX 1 mg QD for empiric UTI treatment, pending urine cultures to narrow abx spectrum

## 2023-12-23 LAB
-  AMOXICILLIN/CLAVULANIC ACID: SIGNIFICANT CHANGE UP
-  AMOXICILLIN/CLAVULANIC ACID: SIGNIFICANT CHANGE UP
-  AMPICILLIN/SULBACTAM: SIGNIFICANT CHANGE UP
-  AMPICILLIN/SULBACTAM: SIGNIFICANT CHANGE UP
-  AMPICILLIN: SIGNIFICANT CHANGE UP
-  AMPICILLIN: SIGNIFICANT CHANGE UP
-  AZTREONAM: SIGNIFICANT CHANGE UP
-  AZTREONAM: SIGNIFICANT CHANGE UP
-  CEFAZOLIN: SIGNIFICANT CHANGE UP
-  CEFAZOLIN: SIGNIFICANT CHANGE UP
-  CEFEPIME: SIGNIFICANT CHANGE UP
-  CEFEPIME: SIGNIFICANT CHANGE UP
-  CEFOXITIN: SIGNIFICANT CHANGE UP
-  CEFOXITIN: SIGNIFICANT CHANGE UP
-  CEFTRIAXONE: SIGNIFICANT CHANGE UP
-  CEFTRIAXONE: SIGNIFICANT CHANGE UP
-  CEFUROXIME: SIGNIFICANT CHANGE UP
-  CEFUROXIME: SIGNIFICANT CHANGE UP
-  CIPROFLOXACIN: SIGNIFICANT CHANGE UP
-  CIPROFLOXACIN: SIGNIFICANT CHANGE UP
-  ERTAPENEM: SIGNIFICANT CHANGE UP
-  ERTAPENEM: SIGNIFICANT CHANGE UP
-  GENTAMICIN: SIGNIFICANT CHANGE UP
-  GENTAMICIN: SIGNIFICANT CHANGE UP
-  IMIPENEM: SIGNIFICANT CHANGE UP
-  IMIPENEM: SIGNIFICANT CHANGE UP
-  LEVOFLOXACIN: SIGNIFICANT CHANGE UP
-  LEVOFLOXACIN: SIGNIFICANT CHANGE UP
-  MEROPENEM: SIGNIFICANT CHANGE UP
-  MEROPENEM: SIGNIFICANT CHANGE UP
-  NITROFURANTOIN: SIGNIFICANT CHANGE UP
-  NITROFURANTOIN: SIGNIFICANT CHANGE UP
-  PIPERACILLIN/TAZOBACTAM: SIGNIFICANT CHANGE UP
-  PIPERACILLIN/TAZOBACTAM: SIGNIFICANT CHANGE UP
-  TOBRAMYCIN: SIGNIFICANT CHANGE UP
-  TOBRAMYCIN: SIGNIFICANT CHANGE UP
-  TRIMETHOPRIM/SULFAMETHOXAZOLE: SIGNIFICANT CHANGE UP
-  TRIMETHOPRIM/SULFAMETHOXAZOLE: SIGNIFICANT CHANGE UP
ALBUMIN SERPL ELPH-MCNC: 2.8 G/DL — LOW (ref 3.3–5)
ALBUMIN SERPL ELPH-MCNC: 2.8 G/DL — LOW (ref 3.3–5)
ALP SERPL-CCNC: 69 U/L — SIGNIFICANT CHANGE UP (ref 40–120)
ALP SERPL-CCNC: 69 U/L — SIGNIFICANT CHANGE UP (ref 40–120)
ALT FLD-CCNC: 15 U/L — SIGNIFICANT CHANGE UP (ref 10–45)
ALT FLD-CCNC: 15 U/L — SIGNIFICANT CHANGE UP (ref 10–45)
ANION GAP SERPL CALC-SCNC: 8 MMOL/L — SIGNIFICANT CHANGE UP (ref 5–17)
ANION GAP SERPL CALC-SCNC: 8 MMOL/L — SIGNIFICANT CHANGE UP (ref 5–17)
AST SERPL-CCNC: 16 U/L — SIGNIFICANT CHANGE UP (ref 10–40)
AST SERPL-CCNC: 16 U/L — SIGNIFICANT CHANGE UP (ref 10–40)
BASOPHILS # BLD AUTO: 0.01 K/UL — SIGNIFICANT CHANGE UP (ref 0–0.2)
BASOPHILS # BLD AUTO: 0.01 K/UL — SIGNIFICANT CHANGE UP (ref 0–0.2)
BASOPHILS NFR BLD AUTO: 0.2 % — SIGNIFICANT CHANGE UP (ref 0–2)
BASOPHILS NFR BLD AUTO: 0.2 % — SIGNIFICANT CHANGE UP (ref 0–2)
BILIRUB SERPL-MCNC: 1 MG/DL — SIGNIFICANT CHANGE UP (ref 0.2–1.2)
BILIRUB SERPL-MCNC: 1 MG/DL — SIGNIFICANT CHANGE UP (ref 0.2–1.2)
BUN SERPL-MCNC: 12 MG/DL — SIGNIFICANT CHANGE UP (ref 7–23)
BUN SERPL-MCNC: 12 MG/DL — SIGNIFICANT CHANGE UP (ref 7–23)
CALCIUM SERPL-MCNC: 8.1 MG/DL — LOW (ref 8.4–10.5)
CALCIUM SERPL-MCNC: 8.1 MG/DL — LOW (ref 8.4–10.5)
CHLORIDE SERPL-SCNC: 114 MMOL/L — HIGH (ref 96–108)
CHLORIDE SERPL-SCNC: 114 MMOL/L — HIGH (ref 96–108)
CO2 SERPL-SCNC: 28 MMOL/L — SIGNIFICANT CHANGE UP (ref 22–31)
CO2 SERPL-SCNC: 28 MMOL/L — SIGNIFICANT CHANGE UP (ref 22–31)
CREAT SERPL-MCNC: 0.45 MG/DL — LOW (ref 0.5–1.3)
CREAT SERPL-MCNC: 0.45 MG/DL — LOW (ref 0.5–1.3)
CULTURE RESULTS: ABNORMAL
CULTURE RESULTS: ABNORMAL
EGFR: 103 ML/MIN/1.73M2 — SIGNIFICANT CHANGE UP
EGFR: 103 ML/MIN/1.73M2 — SIGNIFICANT CHANGE UP
EOSINOPHIL # BLD AUTO: 0.04 K/UL — SIGNIFICANT CHANGE UP (ref 0–0.5)
EOSINOPHIL # BLD AUTO: 0.04 K/UL — SIGNIFICANT CHANGE UP (ref 0–0.5)
EOSINOPHIL NFR BLD AUTO: 0.6 % — SIGNIFICANT CHANGE UP (ref 0–6)
EOSINOPHIL NFR BLD AUTO: 0.6 % — SIGNIFICANT CHANGE UP (ref 0–6)
GLUCOSE BLDC GLUCOMTR-MCNC: 113 MG/DL — HIGH (ref 70–99)
GLUCOSE BLDC GLUCOMTR-MCNC: 113 MG/DL — HIGH (ref 70–99)
GLUCOSE BLDC GLUCOMTR-MCNC: 125 MG/DL — HIGH (ref 70–99)
GLUCOSE BLDC GLUCOMTR-MCNC: 125 MG/DL — HIGH (ref 70–99)
GLUCOSE BLDC GLUCOMTR-MCNC: 137 MG/DL — HIGH (ref 70–99)
GLUCOSE BLDC GLUCOMTR-MCNC: 137 MG/DL — HIGH (ref 70–99)
GLUCOSE BLDC GLUCOMTR-MCNC: 151 MG/DL — HIGH (ref 70–99)
GLUCOSE BLDC GLUCOMTR-MCNC: 151 MG/DL — HIGH (ref 70–99)
GLUCOSE SERPL-MCNC: 130 MG/DL — HIGH (ref 70–99)
GLUCOSE SERPL-MCNC: 130 MG/DL — HIGH (ref 70–99)
HCT VFR BLD CALC: 37.7 % — SIGNIFICANT CHANGE UP (ref 34.5–45)
HCT VFR BLD CALC: 37.7 % — SIGNIFICANT CHANGE UP (ref 34.5–45)
HGB BLD-MCNC: 11.4 G/DL — LOW (ref 11.5–15.5)
HGB BLD-MCNC: 11.4 G/DL — LOW (ref 11.5–15.5)
IMM GRANULOCYTES NFR BLD AUTO: 0.3 % — SIGNIFICANT CHANGE UP (ref 0–0.9)
IMM GRANULOCYTES NFR BLD AUTO: 0.3 % — SIGNIFICANT CHANGE UP (ref 0–0.9)
LYMPHOCYTES # BLD AUTO: 1.39 K/UL — SIGNIFICANT CHANGE UP (ref 1–3.3)
LYMPHOCYTES # BLD AUTO: 1.39 K/UL — SIGNIFICANT CHANGE UP (ref 1–3.3)
LYMPHOCYTES # BLD AUTO: 20.9 % — SIGNIFICANT CHANGE UP (ref 13–44)
LYMPHOCYTES # BLD AUTO: 20.9 % — SIGNIFICANT CHANGE UP (ref 13–44)
MAGNESIUM SERPL-MCNC: 2.2 MG/DL — SIGNIFICANT CHANGE UP (ref 1.6–2.6)
MAGNESIUM SERPL-MCNC: 2.2 MG/DL — SIGNIFICANT CHANGE UP (ref 1.6–2.6)
MCHC RBC-ENTMCNC: 26.8 PG — LOW (ref 27–34)
MCHC RBC-ENTMCNC: 26.8 PG — LOW (ref 27–34)
MCHC RBC-ENTMCNC: 30.2 GM/DL — LOW (ref 32–36)
MCHC RBC-ENTMCNC: 30.2 GM/DL — LOW (ref 32–36)
MCV RBC AUTO: 88.5 FL — SIGNIFICANT CHANGE UP (ref 80–100)
MCV RBC AUTO: 88.5 FL — SIGNIFICANT CHANGE UP (ref 80–100)
METHOD TYPE: SIGNIFICANT CHANGE UP
METHOD TYPE: SIGNIFICANT CHANGE UP
MONOCYTES # BLD AUTO: 0.42 K/UL — SIGNIFICANT CHANGE UP (ref 0–0.9)
MONOCYTES # BLD AUTO: 0.42 K/UL — SIGNIFICANT CHANGE UP (ref 0–0.9)
MONOCYTES NFR BLD AUTO: 6.3 % — SIGNIFICANT CHANGE UP (ref 2–14)
MONOCYTES NFR BLD AUTO: 6.3 % — SIGNIFICANT CHANGE UP (ref 2–14)
NEUTROPHILS # BLD AUTO: 4.78 K/UL — SIGNIFICANT CHANGE UP (ref 1.8–7.4)
NEUTROPHILS # BLD AUTO: 4.78 K/UL — SIGNIFICANT CHANGE UP (ref 1.8–7.4)
NEUTROPHILS NFR BLD AUTO: 71.7 % — SIGNIFICANT CHANGE UP (ref 43–77)
NEUTROPHILS NFR BLD AUTO: 71.7 % — SIGNIFICANT CHANGE UP (ref 43–77)
NRBC # BLD: 0 /100 WBCS — SIGNIFICANT CHANGE UP (ref 0–0)
NRBC # BLD: 0 /100 WBCS — SIGNIFICANT CHANGE UP (ref 0–0)
ORGANISM # SPEC MICROSCOPIC CNT: ABNORMAL
PHOSPHATE SERPL-MCNC: 2.4 MG/DL — LOW (ref 2.5–4.5)
PHOSPHATE SERPL-MCNC: 2.4 MG/DL — LOW (ref 2.5–4.5)
PLATELET # BLD AUTO: 136 K/UL — LOW (ref 150–400)
PLATELET # BLD AUTO: 136 K/UL — LOW (ref 150–400)
POTASSIUM SERPL-MCNC: 3.8 MMOL/L — SIGNIFICANT CHANGE UP (ref 3.5–5.3)
POTASSIUM SERPL-MCNC: 3.8 MMOL/L — SIGNIFICANT CHANGE UP (ref 3.5–5.3)
POTASSIUM SERPL-SCNC: 3.8 MMOL/L — SIGNIFICANT CHANGE UP (ref 3.5–5.3)
POTASSIUM SERPL-SCNC: 3.8 MMOL/L — SIGNIFICANT CHANGE UP (ref 3.5–5.3)
PROT SERPL-MCNC: 5.7 G/DL — LOW (ref 6–8.3)
PROT SERPL-MCNC: 5.7 G/DL — LOW (ref 6–8.3)
RBC # BLD: 4.26 M/UL — SIGNIFICANT CHANGE UP (ref 3.8–5.2)
RBC # BLD: 4.26 M/UL — SIGNIFICANT CHANGE UP (ref 3.8–5.2)
RBC # FLD: 14.6 % — HIGH (ref 10.3–14.5)
RBC # FLD: 14.6 % — HIGH (ref 10.3–14.5)
SODIUM SERPL-SCNC: 150 MMOL/L — HIGH (ref 135–145)
SODIUM SERPL-SCNC: 150 MMOL/L — HIGH (ref 135–145)
SPECIMEN SOURCE: SIGNIFICANT CHANGE UP
SPECIMEN SOURCE: SIGNIFICANT CHANGE UP
WBC # BLD: 6.66 K/UL — SIGNIFICANT CHANGE UP (ref 3.8–10.5)
WBC # BLD: 6.66 K/UL — SIGNIFICANT CHANGE UP (ref 3.8–10.5)
WBC # FLD AUTO: 6.66 K/UL — SIGNIFICANT CHANGE UP (ref 3.8–10.5)
WBC # FLD AUTO: 6.66 K/UL — SIGNIFICANT CHANGE UP (ref 3.8–10.5)

## 2023-12-23 PROCEDURE — 99232 SBSQ HOSP IP/OBS MODERATE 35: CPT

## 2023-12-23 RX ORDER — ASCORBIC ACID 60 MG
500 TABLET,CHEWABLE ORAL DAILY
Refills: 0 | Status: DISCONTINUED | OUTPATIENT
Start: 2023-12-23 | End: 2024-01-12

## 2023-12-23 RX ORDER — FOLIC ACID 0.8 MG
1 TABLET ORAL DAILY
Refills: 0 | Status: DISCONTINUED | OUTPATIENT
Start: 2023-12-23 | End: 2024-01-12

## 2023-12-23 RX ORDER — SODIUM CHLORIDE 9 MG/ML
1000 INJECTION, SOLUTION INTRAVENOUS
Refills: 0 | Status: COMPLETED | OUTPATIENT
Start: 2023-12-23 | End: 2023-12-23

## 2023-12-23 RX ORDER — SERTRALINE 25 MG/1
25 TABLET, FILM COATED ORAL DAILY
Refills: 0 | Status: DISCONTINUED | OUTPATIENT
Start: 2023-12-23 | End: 2024-01-12

## 2023-12-23 RX ORDER — SODIUM CHLORIDE 9 MG/ML
1000 INJECTION, SOLUTION INTRAVENOUS
Refills: 0 | Status: DISCONTINUED | OUTPATIENT
Start: 2023-12-23 | End: 2023-12-24

## 2023-12-23 RX ORDER — COLLAGENASE CLOSTRIDIUM HIST. 250 UNIT/G
1 OINTMENT (GRAM) TOPICAL DAILY
Refills: 0 | Status: DISCONTINUED | OUTPATIENT
Start: 2023-12-23 | End: 2024-01-11

## 2023-12-23 RX ORDER — SODIUM CHLORIDE 9 MG/ML
1000 INJECTION, SOLUTION INTRAVENOUS
Refills: 0 | Status: DISCONTINUED | OUTPATIENT
Start: 2023-12-23 | End: 2023-12-23

## 2023-12-23 RX ORDER — THIAMINE MONONITRATE (VIT B1) 100 MG
100 TABLET ORAL DAILY
Refills: 0 | Status: DISCONTINUED | OUTPATIENT
Start: 2023-12-23 | End: 2024-01-12

## 2023-12-23 RX ADMIN — SERTRALINE 25 MILLIGRAM(S): 25 TABLET, FILM COATED ORAL at 11:24

## 2023-12-23 RX ADMIN — Medication 1 TABLET(S): at 11:24

## 2023-12-23 RX ADMIN — SODIUM CHLORIDE 50 MILLILITER(S): 9 INJECTION, SOLUTION INTRAVENOUS at 12:30

## 2023-12-23 RX ADMIN — Medication 500 MILLIGRAM(S): at 11:24

## 2023-12-23 RX ADMIN — ENOXAPARIN SODIUM 40 MILLIGRAM(S): 100 INJECTION SUBCUTANEOUS at 05:17

## 2023-12-23 RX ADMIN — Medication 1 MILLIGRAM(S): at 11:24

## 2023-12-23 RX ADMIN — SODIUM CHLORIDE 50 MILLILITER(S): 9 INJECTION, SOLUTION INTRAVENOUS at 05:59

## 2023-12-23 RX ADMIN — Medication 56 MICROGRAM(S): at 21:29

## 2023-12-23 RX ADMIN — SODIUM CHLORIDE 50 MILLILITER(S): 9 INJECTION, SOLUTION INTRAVENOUS at 11:23

## 2023-12-23 RX ADMIN — Medication 100 MILLIGRAM(S): at 11:24

## 2023-12-23 RX ADMIN — Medication 1 APPLICATION(S): at 13:44

## 2023-12-23 RX ADMIN — CEFTRIAXONE 100 MILLIGRAM(S): 500 INJECTION, POWDER, FOR SOLUTION INTRAMUSCULAR; INTRAVENOUS at 13:45

## 2023-12-23 NOTE — PROGRESS NOTE ADULT - ATTENDING COMMENTS
69yo F PMH breast cancer s/p L mastectomy, TBI (s/p a fall ~2 years ago) c/b aphasia, meningioma, hypothyroidism (hx Grave's dz s/p radioactive iodine), CVA, and dysphagia, admission 9/19-9/23/23 for hypernatremia presents to [facility] ED on 12/20/2023 with no PO intake and increased lethargy found to have metabolic encephalopathy due to significant hypernatremia to 166 (checking q6h) and possible UTI, guarded prognosis with malnutrition and multiple pressure ulcers, palliative c/s pending.    1. Acute metabolic encephalopathy: P/w progressively worsening lethargy/encephalopathy i/s/o no PO intake for 2-3 days; at baseline, answers simple questions (Y/N, name) per report. Most likely ddx include metabolic (hypernatremia) vs infection (c/f UTI) vs mixed.  CT head with No acute hemorrhage mass or mass effect is seen. Currently AOx0, non-verbal, opens eyes to voice, unable to maintain attention or follow commands  - Manage chronic hypernatremia as below and UTI as below  -aspiration precautions    2. Hypernatremia  -resume D5W@50cc/hour    3. UTI: f/u urine culture.  Continue empiric abx with ceftriaxone 3 day until 12/24    4. Severe malnutrition: unable to participate in speech/swallow eval at this time. I spoke with /reported HCP on phone: he says a feeding tube is in line with her wishes, now on NG tube, feeding started, nutrition consulted    5. Pressure ulcer: Multiple pressure ulcers noted on physical exam with varying stages (I-III), some with peeling and discoloration, possible tissue loss  - Son reports pt using Santyl topical at home  - Consult Wound Care.    6. H/o breast ca: s/p L mastectomy  - Holding home Tamoxifen (NPO).    7. Hypothyroid: Hx of Grave's disease s/p radioactive iodine  - Continue home levothyroxine in IV ( mcg --> IV 56 mcg QD)  - TSH wnl    8.  PPX: lovenox    9. Dispo: Per discussion with  Silverio, he wants feeding tubes and states that would be within her wishes.  He is agreeable to palliative c/s as I'm concerned about her guarded prognosis, 2nd admission with profound hypernatremia, h/o traumatic SAH and longterm outlook. 69yo F PMH breast cancer s/p L mastectomy, TBI (s/p a fall ~2 years ago) c/b aphasia, meningioma, hypothyroidism (hx Grave's dz s/p radioactive iodine), CVA, and dysphagia, admission 9/19-9/23/23 for hypernatremia presents to [facility] ED on 12/20/2023 with no PO intake and increased lethargy found to have metabolic encephalopathy due to significant hypernatremia to 166 (checking q6h) and possible UTI, guarded prognosis with malnutrition and multiple pressure ulcers, palliative c/s pending.    1. Acute metabolic encephalopathy: P/w progressively worsening lethargy/encephalopathy i/s/o no PO intake for 2-3 days; at baseline, answers simple questions (Y/N, name) per report. Most likely ddx include metabolic (hypernatremia) vs infection (c/f UTI) vs mixed.  CT head with No acute hemorrhage mass or mass effect is seen. Currently AOx0, non-verbal, opens eyes to voice, unable to maintain attention or follow commands  - Manage chronic hypernatremia as below and UTI as below  -aspiration precautions    2. Hypernatremia  -resume D5W@50cc/hour    3. UTI: f/u urine culture.  Continue empiric abx with ceftriaxone 3 day until 12/24    4. Severe malnutrition: unable to participate in speech/swallow eval at this time. I spoke with /reported HCP on phone: he says a feeding tube is in line with her wishes, now on NG tube, feeding started, nutrition consulted    5. Pressure ulcer: Multiple pressure ulcers noted on physical exam with varying stages (I-III), some with peeling and discoloration, possible tissue loss  - Son reports pt using Santyl topical at home  - Consult Wound Care.    6. H/o breast ca: s/p L mastectomy  - Holding home Tamoxifen (NPO).    7. Hypothyroid: Hx of Grave's disease s/p radioactive iodine  - Continue home levothyroxine in IV ( mcg --> IV 56 mcg QD)  - TSH wnl    8.  PPX: lovenox    9. Dispo: Per discussion with  Silverio, he wants feeding tubes and states that would be within her wishes.  He is agreeable to palliative c/s as I'm concerned about her guarded prognosis, 2nd admission with profound hypernatremia, h/o traumatic SAH and longterm outlook.

## 2023-12-23 NOTE — PROGRESS NOTE ADULT - PROBLEM SELECTOR PLAN 7
Hx of Grave's disease s/p radioactive iodine  - Continue home levothyroxine in IV ( mcg --> IV 56 mcg QD)

## 2023-12-23 NOTE — PROGRESS NOTE ADULT - PROBLEM SELECTOR PLAN 4
Hx of recurrent UTI (last admission 9/2023). Recent urine culture showing 100k Ecoli.   - Positive UA, s/p IV CTX 1 mg x1 in ED  - Afebrile, hypotensive but HDS  - Continue IV CTX 1 mg QD for empiric UTI treatment, pending urine cultures to narrow abx spectrum

## 2023-12-23 NOTE — PROGRESS NOTE ADULT - PROBLEM SELECTOR PLAN 2
Symptoms of lethargy/encephalopathy >48h with no PO intake, likely has chronic hypernatremia (vs acute) 2/2 severe dehydration. Na 166->156. at goal, then transitioned to 1/4 NS 50cc/hr. repeat Na at midnight 157, continued fluids. morning Na 161.   - Free water deficit 2.4L  - Target lowering the serum Na by 10 mEq/L in 24h  - Start IV D5W at 50cc/hr  - will repeat BMP at 3pm   - Trend serum Na q6h; once the target  rate of correction attained, liberalize to [id number] until normonatremia attained Symptoms of lethargy/encephalopathy >48h with no PO intake, likely has chronic hypernatremia (vs acute) 2/2 severe dehydration. Na 166->156. at goal, then transitioned to 1/4 NS 50cc/hr. repeat Na at midnight 157, continued fluids. morning Na 161.   - Free water deficit 2.4L  - Target lowering the serum Na by 10 mEq/L in 24h  - Start IV D5W at 50cc/hr  - will repeat BMP at 3pm   - Trend serum Na q6h; once the target  rate of correction attained, liberalize to [id number] until normonatremia attained

## 2023-12-23 NOTE — PROGRESS NOTE ADULT - NUTRITIONAL ASSESSMENT
This patient has been assessed with a concern for Malnutrition and has been determined to have a diagnosis/diagnoses of Severe protein-calorie malnutrition and Underweight (BMI < 19).    This patient is being managed with:   Diet NPO with Tube Feed-  Tube Feeding Modality: Nasogastric  Jevity 1.2 Vidal (JEVITY1.2RTH)  Total Volume for 24 Hours (mL): 1320  Continuous  Starting Tube Feed Rate {mL per Hour}: 10  Increase Tube Feed Rate by (mL): 10     Every 6 hours  Until Goal Tube Feed Rate (mL per Hour): 55  Tube Feed Duration (in Hours): 24  Tube Feed Start Time: 20:00  Entered: Dec 22 2023  6:54PM

## 2023-12-23 NOTE — PROGRESS NOTE ADULT - PROBLEM SELECTOR PLAN 3
Hx of dysphagia since 2-3 months ago per son  - Notable for severe b/l ext contracture and cachexia  - Low PO intake at baseline, no PO intake in the last 2-3 days  - Continue mIVF (D5W for hypernatremia)  - Order speech and swallow evaluation once mental status appropriate  - need GOC, Discuss options for possible tube feeding with family ( is HCP)

## 2023-12-23 NOTE — CHART NOTE - NSCHARTNOTEFT_GEN_A_CORE
Nutrition Follow Up Note  Patient seen for: consult - tube feeding assessment     Chart reviewed, events noted.    Source: Electronic Medical Record, Team, Patient - unable to speak     Diet Order:   Diet, NPO with Tube Feed:   Tube Feeding Modality: Nasogastric  Jevity 1.2 Vidal (JEVITY1.2RTH)  Total Volume for 24 Hours (mL): 1320  Continuous  Starting Tube Feed Rate {mL per Hour}: 10  Increase Tube Feed Rate by (mL): 10     Every 6 hours  Until Goal Tube Feed Rate (mL per Hour): 55  Tube Feed Duration (in Hours): 24  Tube Feed Start Time: 20:00 (23)    (Current EN Provision Providinml of formula, 1584kcal/day, 73.2g protein/day)    Nutrition-related events:  -EN: NGT placed ; EN provision not documented in flowsheets thus far   -Swallow: awaiting formal swallow eval upon improvement of mental status   -GI: fecal incontinence noted; no bowel regimen ordered - consider adding; TF formula contains fiber   -Endo: IV Synthroid - continue IV with 24hrs TF   -Renal: hypernatremic, hypophosphatemic; refeeding risk, replete lytes PRN       Nutritionally Pertinent MEDICATIONS  (STANDING):  cefTRIAXone   IVPB  levothyroxine Injectable    Pertinent Labs:  @ 06:44: Na 150<H>, BUN 12, Cr 0.45<L>, <H>, K+ 3.8, Phos 2.4<L>, Mg 2.2, Alk Phos 69, ALT/SGPT 15, AST/SGOT 16, HbA1c --   @ 23:03: Na 153<H>, BUN 13, Cr 0.40<L>, <H>, K+ 3.5, Phos --, Mg --, Alk Phos --, ALT/SGPT --, AST/SGOT --, HbA1c --   @ 16:19: Na 132<L>, BUN 12, Cr 0.46<L>, <HH>, K+ 3.5, Phos --, Mg --, Alk Phos --, ALT/SGPT --, AST/SGOT --, HbA1c --    A1C with Estimated Average Glucose Result: 5.7 % (23 @ 10:33)    Finger Sticks:  POCT Blood Glucose.: 113 mg/dL ( @ 05:12)  POCT Blood Glucose.: 125 mg/dL ( @ 00:00)  POCT Blood Glucose.: 137 mg/dL ( @ 16:57)  POCT Blood Glucose.: 85 mg/dL ( @ 11:57)      (Per flowsheet)  Pressure Injuries: per wound care note   -Hip/Spine: DTI   -Sacral/Buttocks/Bilateral Hip/Shoulder: unstageable     Edema:   -1+ L/R ankle     Estimated Needs: based on IBW - 52.1kg   [x] no change since previous assessment  -Energy: 30-35kcal/kg (1563-1823kcal/day)   -Protein: 1.2-1.6g/kg (62-83g/day)   -Defer fluids to Team    Previous Nutrition Diagnosis:   1) Underweight; Malnourished   2) Increased protein-energy needs   Nutrition Diagnosis is: ongoing     New Nutrition Diagnosis: [x] Not applicable    Nutrition Care Plan:  [x] In Progress  [] Achieved  [] Not applicable    Nutrition Interventions:     Education Provided:       [] Yes:  [x] No:     Recommendations:      1) Continue titrating towards goal of Jevity 1.2 @55ml/hr x 24 hours provides 1320mL of formula, 1584kcal/day (30kcal/kg), 73g protein/day (1.4g/kg), 1065mL free water - based on  IBW 52.1kg  -Defer free water to Team   -Monitor tolerance and adjust PRN   2) Add bowel regimen if medically feasible   3) REFEEDING RISK: add thiamine and multivitamin daily; replete Mg, Phos, K PRN   4) Add Carlos BID   5) [place] GOC and family wishes     Monitoring and Evaluation:   Continue to monitor nutritional intake, tolerance to diet prescription, weights, labs, skin integrity      RD remains available upon request and will follow up per protocol    Susanna López, MS, RDN, CDN (Te[employer]) Nutrition Follow Up Note  Patient seen for: consult - tube feeding assessment     Chart reviewed, events noted.    Source: Electronic Medical Record, Team, Patient - unable to speak     Diet Order:   Diet, NPO with Tube Feed:   Tube Feeding Modality: Nasogastric  Jevity 1.2 Vidal (JEVITY1.2RTH)  Total Volume for 24 Hours (mL): 1320  Continuous  Starting Tube Feed Rate {mL per Hour}: 10  Increase Tube Feed Rate by (mL): 10     Every 6 hours  Until Goal Tube Feed Rate (mL per Hour): 55  Tube Feed Duration (in Hours): 24  Tube Feed Start Time: 20:00 (23)    (Current EN Provision Providinml of formula, 1584kcal/day, 73.2g protein/day)    Nutrition-related events:  -EN: NGT placed ; EN provision not documented in flowsheets thus far   -Swallow: awaiting formal swallow eval upon improvement of mental status   -GI: fecal incontinence noted; no bowel regimen ordered - consider adding; TF formula contains fiber   -Endo: IV Synthroid - continue IV with 24hrs TF   -Renal: hypernatremic, hypophosphatemic; refeeding risk, replete lytes PRN       Nutritionally Pertinent MEDICATIONS  (STANDING):  cefTRIAXone   IVPB  levothyroxine Injectable    Pertinent Labs:  @ 06:44: Na 150<H>, BUN 12, Cr 0.45<L>, <H>, K+ 3.8, Phos 2.4<L>, Mg 2.2, Alk Phos 69, ALT/SGPT 15, AST/SGOT 16, HbA1c --   @ 23:03: Na 153<H>, BUN 13, Cr 0.40<L>, <H>, K+ 3.5, Phos --, Mg --, Alk Phos --, ALT/SGPT --, AST/SGOT --, HbA1c --   @ 16:19: Na 132<L>, BUN 12, Cr 0.46<L>, <HH>, K+ 3.5, Phos --, Mg --, Alk Phos --, ALT/SGPT --, AST/SGOT --, HbA1c --    A1C with Estimated Average Glucose Result: 5.7 % (23 @ 10:33)    Finger Sticks:  POCT Blood Glucose.: 113 mg/dL ( @ 05:12)  POCT Blood Glucose.: 125 mg/dL ( @ 00:00)  POCT Blood Glucose.: 137 mg/dL ( @ 16:57)  POCT Blood Glucose.: 85 mg/dL ( @ 11:57)      (Per flowsheet)  Pressure Injuries: per wound care note   -Hip/Spine: DTI   -Sacral/Buttocks/Bilateral Hip/Shoulder: unstageable     Edema:   -1+ L/R ankle     Estimated Needs: based on IBW - 52.1kg   [x] no change since previous assessment  -Energy: 30-35kcal/kg (1563-1823kcal/day)   -Protein: 1.2-1.6g/kg (62-83g/day)   -Defer fluids to Team    Previous Nutrition Diagnosis:   1) Underweight; Malnourished   2) Increased protein-energy needs   Nutrition Diagnosis is: ongoing     New Nutrition Diagnosis: [x] Not applicable    Nutrition Care Plan:  [x] In Progress  [] Achieved  [] Not applicable    Nutrition Interventions:     Education Provided:       [] Yes:  [x] No:     Recommendations:      1) Continue titrating towards goal of Jevity 1.2 @55ml/hr x 24 hours provides 1320mL of formula, 1584kcal/day (30kcal/kg), 73g protein/day (1.4g/kg), 1065mL free water - based on  IBW 52.1kg  -Defer free water to Team   -Monitor tolerance and adjust PRN   2) Add bowel regimen if medically feasible   3) REFEEDING RISK: add thiamine and multivitamin daily; replete Mg, Phos, K PRN   4) Add Carlos BID   5) [place] GOC and family wishes     Monitoring and Evaluation:   Continue to monitor nutritional intake, tolerance to diet prescription, weights, labs, skin integrity      RD remains available upon request and will follow up per protocol    Susanna López, MS, RDN, CDN (Teams)

## 2023-12-23 NOTE — PROGRESS NOTE ADULT - SUBJECTIVE AND OBJECTIVE BOX
PROGRESS NOTE:     Patient is a 68y old  Female who presents with a chief complaint of No PO intake, lethargy (22 Dec 2023 19:34)      SUBJECTIVE / OVERNIGHT EVENTS:    ADDITIONAL REVIEW OF SYSTEMS: 10 point ROS negative except per HPI    MEDICATIONS  (STANDING):  cefTRIAXone   IVPB 1000 milliGRAM(s) IV Intermittent every 24 hours  enoxaparin Injectable 40 milliGRAM(s) SubCutaneous every 24 hours  levothyroxine Injectable 56 MICROGram(s) IV Push at bedtime    MEDICATIONS  (PRN):      CAPILLARY BLOOD GLUCOSE      POCT Blood Glucose.: 113 mg/dL (23 Dec 2023 05:12)  POCT Blood Glucose.: 125 mg/dL (23 Dec 2023 00:00)  POCT Blood Glucose.: 137 mg/dL (22 Dec 2023 16:57)  POCT Blood Glucose.: 85 mg/dL (22 Dec 2023 11:57)    I&O's Summary      PHYSICAL EXAM:  Vital Signs Last 24 Hrs  T(C): 36.4 (23 Dec 2023 05:16), Max: 36.8 (22 Dec 2023 09:23)  T(F): 97.6 (23 Dec 2023 05:16), Max: 98.3 (22 Dec 2023 09:23)  HR: 81 (23 Dec 2023 05:16) (66 - 81)  BP: 133/77 (23 Dec 2023 05:16) (103/68 - 133/77)  BP(mean): --  RR: 18 (23 Dec 2023 05:16) (18 - 18)  SpO2: 98% (23 Dec 2023 05:16) (96% - 98%)    Parameters below as of 23 Dec 2023 05:16  Patient On (Oxygen Delivery Method): room air        CONSTITUTIONAL: NAD, well-developed, A&Ox3 to person, place, time.  RESPIRATORY: Normal respiratory effort; lungs are clear to auscultation bilaterally  CARDIOVASCULAR: Regular rate and rhythm, normal S1 and S2, no murmur/rub/gallop; No lower extremity edema; Peripheral pulses are 2+ bilaterally  ABDOMEN: Nontender to palpation, no rebound/guarding; No hepatosplenomegaly  MUSCLOSKELETAL: no clubbing or cyanosis of digits; no joint swelling or tenderness to palpation  NEURO: CN 2-12 grossly intact, moves all limbs spontaneously      LABS:                          11.4   6.66  )-----------( 136      ( 23 Dec 2023 06:44 )             37.7     12-22    153<H>  |  118<H>  |  13  ----------------------------<  125<H>  3.5   |  26  |  0.40<L>    Ca    8.1<L>      22 Dec 2023 23:03  Phos  2.0     12-22  Mg     2.2     12-22    TPro  5.6<L>  /  Alb  2.8<L>  /  TBili  0.6  /  DBili  x   /  AST  12  /  ALT  11  /  AlkPhos  71  12-21          -----    MICRO  Urinalysis Basic - ( 22 Dec 2023 23:03 )    Color: x / Appearance: x / SG: x / pH: x  Gluc: 125 mg/dL / Ketone: x  / Bili: x / Urobili: x   Blood: x / Protein: x / Nitrite: x   Leuk Esterase: x / RBC: x / WBC x   Sq Epi: x / Non Sq Epi: x / Bacteria: x        Culture - Urine (collected 20 Dec 2023 11:54)  Source: Clean Catch Clean Catch (Midstream)  Preliminary Report (22 Dec 2023 00:43):    >100,000 CFU/ml Escherichia coli    Culture - Blood (collected 20 Dec 2023 11:30)  Source: .Blood Blood-Peripheral  Preliminary Report (22 Dec 2023 17:01):    No growth at 48 Hours    Culture - Blood (collected 20 Dec 2023 11:15)  Source: .Blood Blood-Peripheral  Preliminary Report (22 Dec 2023 17:01):    No growth at 48 Hours      -----    TRENDS  Hemoglobin: 11.4 g/dL (12-23 @ 06:44)  Hemoglobin: 11.3 g/dL (12-22 @ 07:09)  Hemoglobin: 11.0 g/dL (12-21 @ 10:12)  Hemoglobin: 15.7 g/dL (12-20 @ 11:54)    Creatinine Trend: 0.40<--, 0.46<--, 0.52<--, 0.51<--, 0.52<--, 0.47<--  ----  RADIOLOGY & ADDITIONAL TESTS:  Results Reviewed:   Imaging Personally Reviewed:  Electrocardiogram Personally Reviewed:    COORDINATION OF CARE:  Care Discussed with Consultants/Other Providers [Y/N]:  Prior or Outpatient Records Reviewed [Y/N]:

## 2023-12-23 NOTE — PROGRESS NOTE ADULT - ASSESSMENT
68F with PMH significant for breast cancer s/p L mastectomy, TBI (s/p a fall ~2 years ago) c/b aphasia, meningioma, hypothyroidism (hx Grave's dz s/p radioactive iodine), CVA, and dysphagia who presents to [facility] ED on 12/20/2023 with no PO intake and increased lethargy in the last 2-3 days. Found to have significant hypernatremia to 166 (checking q6h), positive UA with borderline leukocytosis with PMN predominance. Pending culture results. RVP and CTH negative. Afebrile, on RA, hypotensive to 90/60 s/p 1L IVF bolus. Physical exam notable for encephalopathy AOx0 and non-verbal, cachexia and b/l upper/lower ext contracture, and multiple pressure ulcers of varying stages (I-III).   68F with PMH significant for breast cancer s/p L mastectomy, TBI (s/p a fall ~2 years ago) c/b aphasia, meningioma, hypothyroidism (hx Grave's dz s/p radioactive iodine), CVA, and dysphagia who presents to [facility] ED on 12/20/2023 with no PO intake and increased lethargy in the last 2-3 days. Found to have significant hypernatremia to 166 (checking q6h), positive UA with borderline leukocytosis with PMN predominance. Pending culture results. RVP and CTH negative. Afebrile, on RA, hypotensive to 90/60 s/p 1L IVF bolus. Physical exam notable for encephalopathy AOx0 and non-verbal, cachexia and b/l upper/lower ext contracture, and multiple pressure ulcers of varying stages (I-III).

## 2023-12-24 LAB
ALBUMIN SERPL ELPH-MCNC: 2.9 G/DL — LOW (ref 3.3–5)
ALBUMIN SERPL ELPH-MCNC: 2.9 G/DL — LOW (ref 3.3–5)
ALP SERPL-CCNC: 85 U/L — SIGNIFICANT CHANGE UP (ref 40–120)
ALP SERPL-CCNC: 85 U/L — SIGNIFICANT CHANGE UP (ref 40–120)
ALT FLD-CCNC: 22 U/L — SIGNIFICANT CHANGE UP (ref 10–45)
ALT FLD-CCNC: 22 U/L — SIGNIFICANT CHANGE UP (ref 10–45)
ANION GAP SERPL CALC-SCNC: 11 MMOL/L — SIGNIFICANT CHANGE UP (ref 5–17)
ANION GAP SERPL CALC-SCNC: 11 MMOL/L — SIGNIFICANT CHANGE UP (ref 5–17)
ANION GAP SERPL CALC-SCNC: 7 MMOL/L — SIGNIFICANT CHANGE UP (ref 5–17)
ANION GAP SERPL CALC-SCNC: 7 MMOL/L — SIGNIFICANT CHANGE UP (ref 5–17)
AST SERPL-CCNC: 17 U/L — SIGNIFICANT CHANGE UP (ref 10–40)
AST SERPL-CCNC: 17 U/L — SIGNIFICANT CHANGE UP (ref 10–40)
BASOPHILS # BLD AUTO: 0.01 K/UL — SIGNIFICANT CHANGE UP (ref 0–0.2)
BASOPHILS # BLD AUTO: 0.01 K/UL — SIGNIFICANT CHANGE UP (ref 0–0.2)
BASOPHILS NFR BLD AUTO: 0.1 % — SIGNIFICANT CHANGE UP (ref 0–2)
BASOPHILS NFR BLD AUTO: 0.1 % — SIGNIFICANT CHANGE UP (ref 0–2)
BILIRUB SERPL-MCNC: 0.9 MG/DL — SIGNIFICANT CHANGE UP (ref 0.2–1.2)
BILIRUB SERPL-MCNC: 0.9 MG/DL — SIGNIFICANT CHANGE UP (ref 0.2–1.2)
BUN SERPL-MCNC: 10 MG/DL — SIGNIFICANT CHANGE UP (ref 7–23)
BUN SERPL-MCNC: 10 MG/DL — SIGNIFICANT CHANGE UP (ref 7–23)
BUN SERPL-MCNC: 8 MG/DL — SIGNIFICANT CHANGE UP (ref 7–23)
BUN SERPL-MCNC: 8 MG/DL — SIGNIFICANT CHANGE UP (ref 7–23)
CALCIUM SERPL-MCNC: 8.4 MG/DL — SIGNIFICANT CHANGE UP (ref 8.4–10.5)
CALCIUM SERPL-MCNC: 8.4 MG/DL — SIGNIFICANT CHANGE UP (ref 8.4–10.5)
CALCIUM SERPL-MCNC: 8.7 MG/DL — SIGNIFICANT CHANGE UP (ref 8.4–10.5)
CALCIUM SERPL-MCNC: 8.7 MG/DL — SIGNIFICANT CHANGE UP (ref 8.4–10.5)
CHLORIDE SERPL-SCNC: 105 MMOL/L — SIGNIFICANT CHANGE UP (ref 96–108)
CHLORIDE SERPL-SCNC: 105 MMOL/L — SIGNIFICANT CHANGE UP (ref 96–108)
CHLORIDE SERPL-SCNC: 109 MMOL/L — HIGH (ref 96–108)
CHLORIDE SERPL-SCNC: 109 MMOL/L — HIGH (ref 96–108)
CO2 SERPL-SCNC: 25 MMOL/L — SIGNIFICANT CHANGE UP (ref 22–31)
CREAT SERPL-MCNC: 0.39 MG/DL — LOW (ref 0.5–1.3)
CREAT SERPL-MCNC: 0.39 MG/DL — LOW (ref 0.5–1.3)
CREAT SERPL-MCNC: 0.45 MG/DL — LOW (ref 0.5–1.3)
CREAT SERPL-MCNC: 0.45 MG/DL — LOW (ref 0.5–1.3)
EGFR: 103 ML/MIN/1.73M2 — SIGNIFICANT CHANGE UP
EGFR: 103 ML/MIN/1.73M2 — SIGNIFICANT CHANGE UP
EGFR: 107 ML/MIN/1.73M2 — SIGNIFICANT CHANGE UP
EGFR: 107 ML/MIN/1.73M2 — SIGNIFICANT CHANGE UP
EOSINOPHIL # BLD AUTO: 0.01 K/UL — SIGNIFICANT CHANGE UP (ref 0–0.5)
EOSINOPHIL # BLD AUTO: 0.01 K/UL — SIGNIFICANT CHANGE UP (ref 0–0.5)
EOSINOPHIL NFR BLD AUTO: 0.1 % — SIGNIFICANT CHANGE UP (ref 0–6)
EOSINOPHIL NFR BLD AUTO: 0.1 % — SIGNIFICANT CHANGE UP (ref 0–6)
GLUCOSE BLDC GLUCOMTR-MCNC: 189 MG/DL — HIGH (ref 70–99)
GLUCOSE BLDC GLUCOMTR-MCNC: 189 MG/DL — HIGH (ref 70–99)
GLUCOSE BLDC GLUCOMTR-MCNC: 234 MG/DL — HIGH (ref 70–99)
GLUCOSE BLDC GLUCOMTR-MCNC: 234 MG/DL — HIGH (ref 70–99)
GLUCOSE BLDC GLUCOMTR-MCNC: 241 MG/DL — HIGH (ref 70–99)
GLUCOSE BLDC GLUCOMTR-MCNC: 241 MG/DL — HIGH (ref 70–99)
GLUCOSE BLDC GLUCOMTR-MCNC: 245 MG/DL — HIGH (ref 70–99)
GLUCOSE BLDC GLUCOMTR-MCNC: 245 MG/DL — HIGH (ref 70–99)
GLUCOSE SERPL-MCNC: 243 MG/DL — HIGH (ref 70–99)
GLUCOSE SERPL-MCNC: 243 MG/DL — HIGH (ref 70–99)
GLUCOSE SERPL-MCNC: 246 MG/DL — HIGH (ref 70–99)
GLUCOSE SERPL-MCNC: 246 MG/DL — HIGH (ref 70–99)
HCT VFR BLD CALC: 41.9 % — SIGNIFICANT CHANGE UP (ref 34.5–45)
HCT VFR BLD CALC: 41.9 % — SIGNIFICANT CHANGE UP (ref 34.5–45)
HGB BLD-MCNC: 13.2 G/DL — SIGNIFICANT CHANGE UP (ref 11.5–15.5)
HGB BLD-MCNC: 13.2 G/DL — SIGNIFICANT CHANGE UP (ref 11.5–15.5)
IMM GRANULOCYTES NFR BLD AUTO: 0.4 % — SIGNIFICANT CHANGE UP (ref 0–0.9)
IMM GRANULOCYTES NFR BLD AUTO: 0.4 % — SIGNIFICANT CHANGE UP (ref 0–0.9)
LYMPHOCYTES # BLD AUTO: 0.75 K/UL — LOW (ref 1–3.3)
LYMPHOCYTES # BLD AUTO: 0.75 K/UL — LOW (ref 1–3.3)
LYMPHOCYTES # BLD AUTO: 7.5 % — LOW (ref 13–44)
LYMPHOCYTES # BLD AUTO: 7.5 % — LOW (ref 13–44)
MAGNESIUM SERPL-MCNC: 2 MG/DL — SIGNIFICANT CHANGE UP (ref 1.6–2.6)
MCHC RBC-ENTMCNC: 26.8 PG — LOW (ref 27–34)
MCHC RBC-ENTMCNC: 26.8 PG — LOW (ref 27–34)
MCHC RBC-ENTMCNC: 31.5 GM/DL — LOW (ref 32–36)
MCHC RBC-ENTMCNC: 31.5 GM/DL — LOW (ref 32–36)
MCV RBC AUTO: 85 FL — SIGNIFICANT CHANGE UP (ref 80–100)
MCV RBC AUTO: 85 FL — SIGNIFICANT CHANGE UP (ref 80–100)
MONOCYTES # BLD AUTO: 0.25 K/UL — SIGNIFICANT CHANGE UP (ref 0–0.9)
MONOCYTES # BLD AUTO: 0.25 K/UL — SIGNIFICANT CHANGE UP (ref 0–0.9)
MONOCYTES NFR BLD AUTO: 2.5 % — SIGNIFICANT CHANGE UP (ref 2–14)
MONOCYTES NFR BLD AUTO: 2.5 % — SIGNIFICANT CHANGE UP (ref 2–14)
NEUTROPHILS # BLD AUTO: 8.94 K/UL — HIGH (ref 1.8–7.4)
NEUTROPHILS # BLD AUTO: 8.94 K/UL — HIGH (ref 1.8–7.4)
NEUTROPHILS NFR BLD AUTO: 89.4 % — HIGH (ref 43–77)
NEUTROPHILS NFR BLD AUTO: 89.4 % — HIGH (ref 43–77)
NRBC # BLD: 0 /100 WBCS — SIGNIFICANT CHANGE UP (ref 0–0)
NRBC # BLD: 0 /100 WBCS — SIGNIFICANT CHANGE UP (ref 0–0)
PHOSPHATE SERPL-MCNC: 2.3 MG/DL — LOW (ref 2.5–4.5)
PHOSPHATE SERPL-MCNC: 2.3 MG/DL — LOW (ref 2.5–4.5)
PHOSPHATE SERPL-MCNC: 3.1 MG/DL — SIGNIFICANT CHANGE UP (ref 2.5–4.5)
PHOSPHATE SERPL-MCNC: 3.1 MG/DL — SIGNIFICANT CHANGE UP (ref 2.5–4.5)
PLATELET # BLD AUTO: 168 K/UL — SIGNIFICANT CHANGE UP (ref 150–400)
PLATELET # BLD AUTO: 168 K/UL — SIGNIFICANT CHANGE UP (ref 150–400)
POTASSIUM SERPL-MCNC: 3.1 MMOL/L — LOW (ref 3.5–5.3)
POTASSIUM SERPL-MCNC: 3.1 MMOL/L — LOW (ref 3.5–5.3)
POTASSIUM SERPL-MCNC: 4.2 MMOL/L — SIGNIFICANT CHANGE UP (ref 3.5–5.3)
POTASSIUM SERPL-MCNC: 4.2 MMOL/L — SIGNIFICANT CHANGE UP (ref 3.5–5.3)
POTASSIUM SERPL-SCNC: 3.1 MMOL/L — LOW (ref 3.5–5.3)
POTASSIUM SERPL-SCNC: 3.1 MMOL/L — LOW (ref 3.5–5.3)
POTASSIUM SERPL-SCNC: 4.2 MMOL/L — SIGNIFICANT CHANGE UP (ref 3.5–5.3)
POTASSIUM SERPL-SCNC: 4.2 MMOL/L — SIGNIFICANT CHANGE UP (ref 3.5–5.3)
PROT SERPL-MCNC: 6.1 G/DL — SIGNIFICANT CHANGE UP (ref 6–8.3)
PROT SERPL-MCNC: 6.1 G/DL — SIGNIFICANT CHANGE UP (ref 6–8.3)
RBC # BLD: 4.93 M/UL — SIGNIFICANT CHANGE UP (ref 3.8–5.2)
RBC # BLD: 4.93 M/UL — SIGNIFICANT CHANGE UP (ref 3.8–5.2)
RBC # FLD: 14.5 % — SIGNIFICANT CHANGE UP (ref 10.3–14.5)
RBC # FLD: 14.5 % — SIGNIFICANT CHANGE UP (ref 10.3–14.5)
SODIUM SERPL-SCNC: 141 MMOL/L — SIGNIFICANT CHANGE UP (ref 135–145)
WBC # BLD: 10 K/UL — SIGNIFICANT CHANGE UP (ref 3.8–10.5)
WBC # BLD: 10 K/UL — SIGNIFICANT CHANGE UP (ref 3.8–10.5)
WBC # FLD AUTO: 10 K/UL — SIGNIFICANT CHANGE UP (ref 3.8–10.5)
WBC # FLD AUTO: 10 K/UL — SIGNIFICANT CHANGE UP (ref 3.8–10.5)

## 2023-12-24 PROCEDURE — 99232 SBSQ HOSP IP/OBS MODERATE 35: CPT

## 2023-12-24 RX ORDER — POTASSIUM CHLORIDE 20 MEQ
10 PACKET (EA) ORAL
Refills: 0 | Status: COMPLETED | OUTPATIENT
Start: 2023-12-24 | End: 2023-12-24

## 2023-12-24 RX ORDER — LEVOTHYROXINE SODIUM 125 MCG
112 TABLET ORAL DAILY
Refills: 0 | Status: DISCONTINUED | OUTPATIENT
Start: 2023-12-24 | End: 2023-12-29

## 2023-12-24 RX ORDER — SENNA PLUS 8.6 MG/1
2 TABLET ORAL AT BEDTIME
Refills: 0 | Status: DISCONTINUED | OUTPATIENT
Start: 2023-12-24 | End: 2024-01-12

## 2023-12-24 RX ORDER — POTASSIUM CHLORIDE 20 MEQ
20 PACKET (EA) ORAL ONCE
Refills: 0 | Status: COMPLETED | OUTPATIENT
Start: 2023-12-24 | End: 2023-12-24

## 2023-12-24 RX ORDER — POLYETHYLENE GLYCOL 3350 17 G/17G
17 POWDER, FOR SOLUTION ORAL DAILY
Refills: 0 | Status: DISCONTINUED | OUTPATIENT
Start: 2023-12-24 | End: 2024-01-12

## 2023-12-24 RX ADMIN — SERTRALINE 25 MILLIGRAM(S): 25 TABLET, FILM COATED ORAL at 12:23

## 2023-12-24 RX ADMIN — Medication 100 MILLIEQUIVALENT(S): at 10:42

## 2023-12-24 RX ADMIN — ENOXAPARIN SODIUM 40 MILLIGRAM(S): 100 INJECTION SUBCUTANEOUS at 05:39

## 2023-12-24 RX ADMIN — Medication 1 TABLET(S): at 12:23

## 2023-12-24 RX ADMIN — Medication 100 MILLIGRAM(S): at 12:23

## 2023-12-24 RX ADMIN — Medication 100 MILLIEQUIVALENT(S): at 11:45

## 2023-12-24 RX ADMIN — Medication 1 MILLIGRAM(S): at 12:23

## 2023-12-24 RX ADMIN — Medication 1 APPLICATION(S): at 12:23

## 2023-12-24 RX ADMIN — Medication 63.75 MILLIMOLE(S): at 12:21

## 2023-12-24 RX ADMIN — Medication 20 MILLIEQUIVALENT(S): at 12:23

## 2023-12-24 RX ADMIN — POLYETHYLENE GLYCOL 3350 17 GRAM(S): 17 POWDER, FOR SOLUTION ORAL at 12:23

## 2023-12-24 RX ADMIN — Medication 500 MILLIGRAM(S): at 12:23

## 2023-12-24 RX ADMIN — Medication 100 MILLIEQUIVALENT(S): at 12:35

## 2023-12-24 NOTE — PROGRESS NOTE ADULT - PROBLEM SELECTOR PLAN 1
P/w progressively worsening lethargy/encephalopathy i/s/o no PO intake for 2-3 days; at baseline, answers simple questions (Y/N, name) per son  - Most likely ddx include metabolic (hypernatremia) vs infection (c/f UTI) vs mixed  - Currently AOx0, non-verbal, opens eyes to voice, unable to maintain attention or follow commands  - Manage chronic hypernatremia (see the specific section of the plan)  - Positive UA, s/p IV CTX 1 mg x1 in ED  - Continue IV CTX 1 mg QD for empiric UTI treatment, pending urine cultures P/w progressively worsening lethargy/encephalopathy i/s/o no PO intake for 2-3 days; at baseline, answers simple questions (Y/N, name) per son  - Most likely ddx include metabolic (hypernatremia) vs infection (c/f UTI) vs mixed  - Currently AOx0, non-verbal, opens eyes to voice, unable to maintain attention or follow commands  - Manage chronic hypernatremia (see the specific section of the plan)  - Positive UA, s/p IV CTX 1 mg x1 in ED  - now completed UTI treatment course P/w progressively worsening lethargy/encephalopathy i/s/o no PO intake for 2-3 days; at baseline, answers simple questions (Y/N, name) per son. Most likely ddx include metabolic (hypernatremia) vs infection (c/f UTI) vs mixed    - Currently AOx0, non-verbal, opens eyes to voice, unable to maintain attention or follow commands  - Manage chronic hypernatremia (see the specific section of the plan)  - now completed UTI treatment course

## 2023-12-24 NOTE — PROGRESS NOTE ADULT - ATTENDING COMMENTS
67yo F PMH breast cancer s/p L mastectomy, TBI (s/p a fall ~2 years ago) c/b aphasia, meningioma, hypothyroidism (hx Grave's dz s/p radioactive iodine), CVA, and dysphagia, admission 9/19-9/23/23 for hypernatremia presents to [facility] ED on 12/20/2023 with no PO intake and increased lethargy found to have metabolic encephalopathy due to significant hypernatremia to 166 (checking q6h) and possible UTI, guarded prognosis with malnutrition and multiple pressure ulcers, palliative c/s pending.    1. Acute metabolic encephalopathy: P/w progressively worsening lethargy/encephalopathy i/s/o no PO intake for 2-3 days; at baseline, answers simple questions (Y/N, name) per report. Most likely ddx include metabolic (hypernatremia) vs infection (c/f UTI) vs mixed.  CT head with No acute hemorrhage mass or mass effect is seen. Currently AOx0, non-verbal, opens eyes to voice, unable to maintain attention or follow commands  - Manage chronic hypernatremia as below and UTI as below  -aspiration precautions    2. Hypernatremia  - improved with D5, now dcd    3. UTI: f/u urine culture.  Continue empiric abx with ceftriaxone 3 day until 12/24    4. Severe malnutrition: unable to participate in speech/swallow eval at this time. per  feeding tube is in line with her wishes, now on NG tube, feeding started, nutrition consulted. monitoring for refeeding syndrome     5. Pressure ulcer: Multiple pressure ulcers noted on physical exam with varying stages (I-III), some with peeling and discoloration, possible tissue loss  - Son reports pt using Santyl topical at home  - Consult Wound Care.    6. H/o breast ca: s/p L mastectomy  - Holding home Tamoxifen (NPO).    7. Hypothyroid: Hx of Grave's disease s/p radioactive iodine  - Continue home levothyroxine in IV ( mcg --> IV 56 mcg QD)  - TSH wnl    8.  PPX: lovenox    9. Dispo: Per discussion with  Silverio, he wants feeding tubes and states that would be within her wishes.  He is agreeable to palliative c/s as I'm concerned about her guarded prognosis, 2nd admission with profound hypernatremia, h/o traumatic SAH and longterm outlook. 69yo F PMH breast cancer s/p L mastectomy, TBI (s/p a fall ~2 years ago) c/b aphasia, meningioma, hypothyroidism (hx Grave's dz s/p radioactive iodine), CVA, and dysphagia, admission 9/19-9/23/23 for hypernatremia presents to [facility] ED on 12/20/2023 with no PO intake and increased lethargy found to have metabolic encephalopathy due to significant hypernatremia to 166 (checking q6h) and possible UTI, guarded prognosis with malnutrition and multiple pressure ulcers, palliative c/s pending.    1. Acute metabolic encephalopathy: P/w progressively worsening lethargy/encephalopathy i/s/o no PO intake for 2-3 days; at baseline, answers simple questions (Y/N, name) per report. Most likely ddx include metabolic (hypernatremia) vs infection (c/f UTI) vs mixed.  CT head with No acute hemorrhage mass or mass effect is seen. Currently AOx0, non-verbal, opens eyes to voice, unable to maintain attention or follow commands  - Manage chronic hypernatremia as below and UTI as below  -aspiration precautions    2. Hypernatremia  - improved with D5, now dcd    3. UTI: f/u urine culture.  Continue empiric abx with ceftriaxone 3 day until 12/24    4. Severe malnutrition: unable to participate in speech/swallow eval at this time. per  feeding tube is in line with her wishes, now on NG tube, feeding started, nutrition consulted. monitoring for refeeding syndrome     5. Pressure ulcer: Multiple pressure ulcers noted on physical exam with varying stages (I-III), some with peeling and discoloration, possible tissue loss  - Son reports pt using Santyl topical at home  - Consult Wound Care.    6. H/o breast ca: s/p L mastectomy  - Holding home Tamoxifen (NPO).    7. Hypothyroid: Hx of Grave's disease s/p radioactive iodine  - Continue home levothyroxine in IV ( mcg --> IV 56 mcg QD)  - TSH wnl    8.  PPX: lovenox    9. Dispo: Per discussion with  Silverio, he wants feeding tubes and states that would be within her wishes.  He is agreeable to palliative c/s as I'm concerned about her guarded prognosis, 2nd admission with profound hypernatremia, h/o traumatic SAH and longterm outlook.

## 2023-12-24 NOTE — PROGRESS NOTE ADULT - SUBJECTIVE AND OBJECTIVE BOX
*******************************  Usha Reyes MD (PGY-1)  Internal Medicine  Contact via [employer] TEAMS  *******************************    YOUNGER, JONATHAN  68y  Female    Patient is a 68y old  Female who presents with a chief complaint of No PO intake, lethargy (23 Dec 2023 07:01)      Subjective:    Objective:  T(C): 36.8 (12-24-23 @ 04:22), Max: 36.8 (12-24-23 @ 04:22)  HR: 80 (12-24-23 @ 04:22) (67 - 84)  BP: 92/64 (12-24-23 @ 04:22) (90/67 - 110/76)  RR: 18 (12-24-23 @ 04:22) (18 - 18)  SpO2: 98% (12-24-23 @ 04:22) (92% - 99%)  I&O's Summary    23 Dec 2023 07:01  -  24 Dec 2023 07:00  --------------------------------------------------------  IN: 160 mL / OUT: 0 mL / NET: 160 mL        PHYSICAL EXAM:  GENERAL: NAD, well-groomed, well-developed  HEAD:  Atraumatic, Normocephalic  EYES: EOMI, PERRLA, conjunctiva and sclera clear  ENMT: No tonsillar erythema, exudates, or enlargement; Moist mucous membranes, Good dentition, No lesions  NECK: Supple, No JVD, Normal thyroid  NERVOUS SYSTEM:  Alert & Oriented X3, Good concentration; Motor Strength 5/5 B/L upper and lower extremities; DTRs 2+ intact and symmetric  CHEST/LUNG: Clear to auscultation bilaterally; No rales, rhonchi, wheezing, or rubs  HEART: Regular rate and rhythm; No murmurs, rubs, or gallops  ABDOMEN: Soft, Nontender, Nondistended; Bowel sounds present  EXTREMITIES:  2+ Peripheral Pulses, No clubbing, cyanosis, or edema  LYMPH: No lymphadenopathy noted  SKIN: No rashes or lesions    LABS:      CAPILLARY BLOOD GLUCOSE      POCT Blood Glucose.: 234 mg/dL (24 Dec 2023 06:01)  POCT Blood Glucose.: 189 mg/dL (24 Dec 2023 01:33)  POCT Blood Glucose.: 151 mg/dL (23 Dec 2023 17:00)  POCT Blood Glucose.: 137 mg/dL (23 Dec 2023 11:57)      RADIOLOGY & ADDITIONAL TESTS:    MEDICATIONS  (STANDING):  ascorbic acid 500 milliGRAM(s) Oral daily  collagenase Ointment 1 Application(s) Topical daily  dextrose 5%. 1000 milliLiter(s) (50 mL/Hr) IV Continuous <Continuous>  enoxaparin Injectable 40 milliGRAM(s) SubCutaneous every 24 hours  folic acid 1 milliGRAM(s) Oral daily  levothyroxine Injectable 56 MICROGram(s) IV Push at bedtime  multivitamin 1 Tablet(s) Oral daily  sertraline 25 milliGRAM(s) Oral daily  thiamine 100 milliGRAM(s) Oral daily    MEDICATIONS  (PRN):             *******************************  Usha Reyes MD (PGY-1)  Internal Medicine  Contact via [employer] TEAMS  *******************************    YOUNGER, JONATHAN  68y  Female    Patient is a 68y old  Female who presents with a chief complaint of No PO intake, lethargy (23 Dec 2023 07:01)    Subjective: Seen at bedside this morning. Unable to participate in history taking or physical exam.     Objective:  T(C): 36.8 (12-24-23 @ 04:22), Max: 36.8 (12-24-23 @ 04:22)  HR: 80 (12-24-23 @ 04:22) (67 - 84)  BP: 92/64 (12-24-23 @ 04:22) (90/67 - 110/76)  RR: 18 (12-24-23 @ 04:22) (18 - 18)  SpO2: 98% (12-24-23 @ 04:22) (92% - 99%)  I&O's Summary    23 Dec 2023 07:01  -  24 Dec 2023 07:00  --------------------------------------------------------  IN: 160 mL / OUT: 0 mL / NET: 160 mL    PHYSICAL EXAM:  GENERAL: NAD, cachetic, chronically ill-appearing   HEAD:  Atraumatic, Normocephalic  EYES: EOMI, PERRLA, conjunctiva and sclera clear  ENMT: NGT in place. dry mucous membranes, poor dentition  NECK: Supple, trachea midline   NERVOUS SYSTEM:  Alert & Oriented X0, eyes open but does not track. does not follow commands   CHEST/LUNG: Clear to auscultation bilaterally; No rales, rhonchi, wheezing, or rubs  HEART: Regular rate and rhythm; No murmurs, rubs, or gallops  ABDOMEN: Soft, Nontender, Nondistended; Bowel sounds present  EXTREMITIES:  2+ Peripheral Pulses, severely contracted   LYMPH: No lymphadenopathy noted  SKIN: No rashes or lesions    LABS:             13.2   10.00 )-----------( 168      ( 24 Dec 2023 07:21 )             41.9     12-24    141  |  105  |  8   ----------------------------<  246<H>  3.1<L>   |  25  |  0.39<L>    Ca    8.7      24 Dec 2023 07:14  Phos  2.3     12-24  Mg     2.0     12-24    TPro  6.1  /  Alb  2.9<L>  /  TBili  0.9  /  DBili  x   /  AST  17  /  ALT  22  /  AlkPhos  85  12-24        Lactate Trend  12-20 @ 15:24 Lactate:1.3     Culture Results:   >100,000 CFU/ml Escherichia coli (12-20 @ 11:54)  Culture Results:   No growth at 72 Hours (12-20 @ 11:30)  Culture Results:   No growth at 72 Hours (12-20 @ 11:15)      CAPILLARY BLOOD GLUCOSE  POCT Blood Glucose.: 234 mg/dL (24 Dec 2023 06:01)  POCT Blood Glucose.: 189 mg/dL (24 Dec 2023 01:33)  POCT Blood Glucose.: 151 mg/dL (23 Dec 2023 17:00)  POCT Blood Glucose.: 137 mg/dL (23 Dec 2023 11:57)    RADIOLOGY & ADDITIONAL TESTS:    MEDICATIONS  (STANDING):  ascorbic acid 500 milliGRAM(s) Oral daily  collagenase Ointment 1 Application(s) Topical daily  dextrose 5%. 1000 milliLiter(s) (50 mL/Hr) IV Continuous <Continuous>  enoxaparin Injectable 40 milliGRAM(s) SubCutaneous every 24 hours  folic acid 1 milliGRAM(s) Oral daily  levothyroxine Injectable 56 MICROGram(s) IV Push at bedtime  multivitamin 1 Tablet(s) Oral daily  sertraline 25 milliGRAM(s) Oral daily  thiamine 100 milliGRAM(s) Oral daily    MEDICATIONS  (PRN):

## 2023-12-24 NOTE — PROGRESS NOTE ADULT - PROBLEM SELECTOR PLAN 7
Hx of Grave's disease s/p radioactive iodine  - Continue home levothyroxine in IV ( mcg --> IV 56 mcg QD) Hx of Grave's disease s/p radioactive iodine  - resumed home levothyroxine 112mcg via NG tube

## 2023-12-24 NOTE — PROGRESS NOTE ADULT - PROBLEM SELECTOR PLAN 3
Hx of dysphagia since 2-3 months ago per son  - Notable for severe b/l ext contracture and cachexia  - Low PO intake at baseline, no PO intake in the last 2-3 days  - Continue mIVF (D5W for hypernatremia)  - Order speech and swallow evaluation once mental status appropriate  - need GOC, Discuss options for possible tube feeding with family ( is HCP) Hx of dysphagia since 2-3 months ago per son  - Notable for severe b/l ext contracture and cachexia  - Low PO intake at baseline, no PO intake in the last 2-3 days  - NGT placed 12/22   - per discussion w/  who is HCP, feeding tube is in line w/ patient's wishes.   - palliative care consult

## 2023-12-24 NOTE — PROGRESS NOTE ADULT - PROBLEM SELECTOR PLAN 4
Hx of recurrent UTI (last admission 9/2023). Recent urine culture showing 100k Ecoli.   - Positive UA, s/p IV CTX 1 mg x1 in ED  - Afebrile, hypotensive but HDS  - Continue IV CTX 1 mg QD for empiric UTI treatment, pending urine cultures to narrow abx spectrum Hx of recurrent UTI (last admission 9/2023). Recent urine culture showing 100k Ecoli.   - Positive UA, s/p IV CTX 1 mg x1 in ED  - completed 3 day course 12/21-23

## 2023-12-24 NOTE — PROGRESS NOTE ADULT - ASSESSMENT
68F with PMH significant for breast cancer s/p L mastectomy, TBI (s/p a fall ~2 years ago) c/b aphasia, meningioma, hypothyroidism (hx Grave's dz s/p radioactive iodine), CVA, and dysphagia who presents to [facility] ED on 12/20/2023 with no PO intake and increased lethargy in the last 2-3 days. Found to have significant hypernatremia to 166 (checking q6h), positive UA with borderline leukocytosis with PMN predominance. Pending culture results. RVP and CTH negative. Afebrile, on RA, hypotensive to 90/60 s/p 1L IVF bolus. Physical exam notable for encephalopathy AOx0 and non-verbal, cachexia and b/l upper/lower ext contracture, and multiple pressure ulcers of varying stages (I-III).   68F with PMH significant for breast cancer s/p L mastectomy, TBI (s/p a fall ~2 years ago) c/b aphasia, meningioma, hypothyroidism (hx Grave's dz s/p radioactive iodine), CVA, and dysphagia who presents to [facility] ED on 12/20/2023 with no PO intake and increased lethargy in the last 2-3 days. Found to have significant hypernatremia to 166 (checking q6h), positive UA with borderline leukocytosis with PMN predominance. Pending culture results. RVP and CTH negative. Afebrile, on RA, hypotensive to 90/60 s/p 1L IVF bolus. Physical exam notable for encephalopathy AOx0 and non-verbal, cachexia and b/l upper/lower ext contracture, and multiple pressure ulcers of varying stages (I-III).   68F with PMH significant for breast cancer s/p L mastectomy, TBI (s/p a fall ~2 years ago) c/b aphasia, meningioma, hypothyroidism (hx Grave's dz s/p radioactive iodine), CVA, and dysphagia who presents to [facility] ED on 12/20/2023 with no PO intake and increased lethargy in the last 2-3 days, admitted for hypernatremia 166 and UTI.    68F with PMH significant for breast cancer s/p L mastectomy, TBI (s/p a fall ~2 years ago) c/b aphasia, meningioma, hypothyroidism (hx Grave's dz s/p radioactive iodine), CVA, and dysphagia who presents to [facility] ED on 12/20/2023 with no PO intake and increased lethargy in the last 2-3 days, admitted for hypernatremia 166 and UTI.

## 2023-12-24 NOTE — PROGRESS NOTE ADULT - PROBLEM SELECTOR PLAN 2
Symptoms of lethargy/encephalopathy >48h with no PO intake, likely has chronic hypernatremia (vs acute) 2/2 severe dehydration. Na 166->156. at goal, then transitioned to 1/4 NS 50cc/hr. repeat Na at midnight 157, continued fluids. morning Na 161.   - Free water deficit 2.4L  - Target lowering the serum Na by 10 mEq/L in 24h  - Start IV D5W at 50cc/hr  - will repeat BMP at 3pm   - Trend serum Na q6h; once the target  rate of correction attained, liberalize to [id number] until normonatremia attained Symptoms of lethargy/encephalopathy >48h with no PO intake, likely has chronic hypernatremia (vs acute) 2/2 severe dehydration. Na 166->156. at goal, then transitioned to 1/4 NS 50cc/hr. repeat Na at midnight 157, continued fluids. morning Na 161.   - Free water deficit 2.4L  - Target lowering the serum Na by 10 mEq/L in 24h  - Start IV D5W at 50cc/hr  - will repeat BMP at 3pm   - Trend serum Na q6h; once the target  rate of correction attained, liberalize to [id number] until normonatremia attained Symptoms of lethargy/encephalopathy >48h with no PO intake, likely has chronic hypernatremia (vs acute) 2/2 severe dehydration. Na 166->156. at goal, then transitioned to 1/4 NS 50cc/hr. repeat Na at midnight 157, continued fluids. Na improved to 141. Symptoms of lethargy/encephalopathy >48h with no PO intake, likely has chronic hypernatremia (vs acute) 2/2 severe dehydration. Na 166->156. at goal, then transitioned to 1/4 NS 50cc/hr. repeat Na at midnight 157, continued fluids. Na improved to 141.    - monitor BMP

## 2023-12-24 NOTE — PROGRESS NOTE ADULT - NUTRITIONAL ASSESSMENT
This patient has been assessed with a concern for Malnutrition and has been determined to have a diagnosis/diagnoses of Severe protein-calorie malnutrition and Underweight (BMI < 19).    This patient is being managed with:   Diet NPO with Tube Feed-  Tube Feeding Modality: Nasogastric  Jevity 1.2 Vidal (JEVITY1.2RTH)  Total Volume for 24 Hours (mL): 1320  Continuous  Starting Tube Feed Rate {mL per Hour}: 10  Increase Tube Feed Rate by (mL): 10     Every 6 hours  Until Goal Tube Feed Rate (mL per Hour): 55  Tube Feed Duration (in Hours): 24  Tube Feed Start Time: 00:00  Carlos(7 Gm Arginine/7 Gm Glut/1.2 Gm HMB     Qty per Day:  2  Entered: Dec 23 2023  9:14AM

## 2023-12-25 LAB
ALBUMIN SERPL ELPH-MCNC: 2.8 G/DL — LOW (ref 3.3–5)
ALBUMIN SERPL ELPH-MCNC: 2.8 G/DL — LOW (ref 3.3–5)
ALP SERPL-CCNC: 81 U/L — SIGNIFICANT CHANGE UP (ref 40–120)
ALP SERPL-CCNC: 81 U/L — SIGNIFICANT CHANGE UP (ref 40–120)
ALT FLD-CCNC: 18 U/L — SIGNIFICANT CHANGE UP (ref 10–45)
ALT FLD-CCNC: 18 U/L — SIGNIFICANT CHANGE UP (ref 10–45)
ANION GAP SERPL CALC-SCNC: 11 MMOL/L — SIGNIFICANT CHANGE UP (ref 5–17)
ANION GAP SERPL CALC-SCNC: 11 MMOL/L — SIGNIFICANT CHANGE UP (ref 5–17)
AST SERPL-CCNC: 13 U/L — SIGNIFICANT CHANGE UP (ref 10–40)
AST SERPL-CCNC: 13 U/L — SIGNIFICANT CHANGE UP (ref 10–40)
BASOPHILS # BLD AUTO: 0.01 K/UL — SIGNIFICANT CHANGE UP (ref 0–0.2)
BASOPHILS # BLD AUTO: 0.01 K/UL — SIGNIFICANT CHANGE UP (ref 0–0.2)
BASOPHILS NFR BLD AUTO: 0.1 % — SIGNIFICANT CHANGE UP (ref 0–2)
BASOPHILS NFR BLD AUTO: 0.1 % — SIGNIFICANT CHANGE UP (ref 0–2)
BILIRUB SERPL-MCNC: 0.6 MG/DL — SIGNIFICANT CHANGE UP (ref 0.2–1.2)
BILIRUB SERPL-MCNC: 0.6 MG/DL — SIGNIFICANT CHANGE UP (ref 0.2–1.2)
BUN SERPL-MCNC: 15 MG/DL — SIGNIFICANT CHANGE UP (ref 7–23)
BUN SERPL-MCNC: 15 MG/DL — SIGNIFICANT CHANGE UP (ref 7–23)
CALCIUM SERPL-MCNC: 8.2 MG/DL — LOW (ref 8.4–10.5)
CALCIUM SERPL-MCNC: 8.2 MG/DL — LOW (ref 8.4–10.5)
CHLORIDE SERPL-SCNC: 110 MMOL/L — HIGH (ref 96–108)
CHLORIDE SERPL-SCNC: 110 MMOL/L — HIGH (ref 96–108)
CO2 SERPL-SCNC: 23 MMOL/L — SIGNIFICANT CHANGE UP (ref 22–31)
CO2 SERPL-SCNC: 23 MMOL/L — SIGNIFICANT CHANGE UP (ref 22–31)
CREAT SERPL-MCNC: 0.55 MG/DL — SIGNIFICANT CHANGE UP (ref 0.5–1.3)
CREAT SERPL-MCNC: 0.55 MG/DL — SIGNIFICANT CHANGE UP (ref 0.5–1.3)
CULTURE RESULTS: SIGNIFICANT CHANGE UP
EGFR: 99 ML/MIN/1.73M2 — SIGNIFICANT CHANGE UP
EGFR: 99 ML/MIN/1.73M2 — SIGNIFICANT CHANGE UP
EOSINOPHIL # BLD AUTO: 0.01 K/UL — SIGNIFICANT CHANGE UP (ref 0–0.5)
EOSINOPHIL # BLD AUTO: 0.01 K/UL — SIGNIFICANT CHANGE UP (ref 0–0.5)
EOSINOPHIL NFR BLD AUTO: 0.1 % — SIGNIFICANT CHANGE UP (ref 0–6)
EOSINOPHIL NFR BLD AUTO: 0.1 % — SIGNIFICANT CHANGE UP (ref 0–6)
GLUCOSE BLDC GLUCOMTR-MCNC: 128 MG/DL — HIGH (ref 70–99)
GLUCOSE BLDC GLUCOMTR-MCNC: 128 MG/DL — HIGH (ref 70–99)
GLUCOSE BLDC GLUCOMTR-MCNC: 174 MG/DL — HIGH (ref 70–99)
GLUCOSE BLDC GLUCOMTR-MCNC: 174 MG/DL — HIGH (ref 70–99)
GLUCOSE BLDC GLUCOMTR-MCNC: 187 MG/DL — HIGH (ref 70–99)
GLUCOSE BLDC GLUCOMTR-MCNC: 187 MG/DL — HIGH (ref 70–99)
GLUCOSE BLDC GLUCOMTR-MCNC: 206 MG/DL — HIGH (ref 70–99)
GLUCOSE BLDC GLUCOMTR-MCNC: 206 MG/DL — HIGH (ref 70–99)
GLUCOSE BLDC GLUCOMTR-MCNC: 222 MG/DL — HIGH (ref 70–99)
GLUCOSE BLDC GLUCOMTR-MCNC: 222 MG/DL — HIGH (ref 70–99)
GLUCOSE SERPL-MCNC: 212 MG/DL — HIGH (ref 70–99)
GLUCOSE SERPL-MCNC: 212 MG/DL — HIGH (ref 70–99)
HCT VFR BLD CALC: 39.4 % — SIGNIFICANT CHANGE UP (ref 34.5–45)
HCT VFR BLD CALC: 39.4 % — SIGNIFICANT CHANGE UP (ref 34.5–45)
HGB BLD-MCNC: 12.4 G/DL — SIGNIFICANT CHANGE UP (ref 11.5–15.5)
HGB BLD-MCNC: 12.4 G/DL — SIGNIFICANT CHANGE UP (ref 11.5–15.5)
IMM GRANULOCYTES NFR BLD AUTO: 0.4 % — SIGNIFICANT CHANGE UP (ref 0–0.9)
IMM GRANULOCYTES NFR BLD AUTO: 0.4 % — SIGNIFICANT CHANGE UP (ref 0–0.9)
LYMPHOCYTES # BLD AUTO: 1.3 K/UL — SIGNIFICANT CHANGE UP (ref 1–3.3)
LYMPHOCYTES # BLD AUTO: 1.3 K/UL — SIGNIFICANT CHANGE UP (ref 1–3.3)
LYMPHOCYTES # BLD AUTO: 12.5 % — LOW (ref 13–44)
LYMPHOCYTES # BLD AUTO: 12.5 % — LOW (ref 13–44)
MAGNESIUM SERPL-MCNC: 2.1 MG/DL — SIGNIFICANT CHANGE UP (ref 1.6–2.6)
MAGNESIUM SERPL-MCNC: 2.1 MG/DL — SIGNIFICANT CHANGE UP (ref 1.6–2.6)
MCHC RBC-ENTMCNC: 26.7 PG — LOW (ref 27–34)
MCHC RBC-ENTMCNC: 26.7 PG — LOW (ref 27–34)
MCHC RBC-ENTMCNC: 31.5 GM/DL — LOW (ref 32–36)
MCHC RBC-ENTMCNC: 31.5 GM/DL — LOW (ref 32–36)
MCV RBC AUTO: 84.7 FL — SIGNIFICANT CHANGE UP (ref 80–100)
MCV RBC AUTO: 84.7 FL — SIGNIFICANT CHANGE UP (ref 80–100)
MONOCYTES # BLD AUTO: 0.35 K/UL — SIGNIFICANT CHANGE UP (ref 0–0.9)
MONOCYTES # BLD AUTO: 0.35 K/UL — SIGNIFICANT CHANGE UP (ref 0–0.9)
MONOCYTES NFR BLD AUTO: 3.4 % — SIGNIFICANT CHANGE UP (ref 2–14)
MONOCYTES NFR BLD AUTO: 3.4 % — SIGNIFICANT CHANGE UP (ref 2–14)
NEUTROPHILS # BLD AUTO: 8.67 K/UL — HIGH (ref 1.8–7.4)
NEUTROPHILS # BLD AUTO: 8.67 K/UL — HIGH (ref 1.8–7.4)
NEUTROPHILS NFR BLD AUTO: 83.5 % — HIGH (ref 43–77)
NEUTROPHILS NFR BLD AUTO: 83.5 % — HIGH (ref 43–77)
NRBC # BLD: 0 /100 WBCS — SIGNIFICANT CHANGE UP (ref 0–0)
NRBC # BLD: 0 /100 WBCS — SIGNIFICANT CHANGE UP (ref 0–0)
PHOSPHATE SERPL-MCNC: 2.1 MG/DL — LOW (ref 2.5–4.5)
PHOSPHATE SERPL-MCNC: 2.1 MG/DL — LOW (ref 2.5–4.5)
PLATELET # BLD AUTO: 158 K/UL — SIGNIFICANT CHANGE UP (ref 150–400)
PLATELET # BLD AUTO: 158 K/UL — SIGNIFICANT CHANGE UP (ref 150–400)
POTASSIUM SERPL-MCNC: 4.3 MMOL/L — SIGNIFICANT CHANGE UP (ref 3.5–5.3)
POTASSIUM SERPL-MCNC: 4.3 MMOL/L — SIGNIFICANT CHANGE UP (ref 3.5–5.3)
POTASSIUM SERPL-SCNC: 4.3 MMOL/L — SIGNIFICANT CHANGE UP (ref 3.5–5.3)
POTASSIUM SERPL-SCNC: 4.3 MMOL/L — SIGNIFICANT CHANGE UP (ref 3.5–5.3)
PROT SERPL-MCNC: 5.9 G/DL — LOW (ref 6–8.3)
PROT SERPL-MCNC: 5.9 G/DL — LOW (ref 6–8.3)
RBC # BLD: 4.65 M/UL — SIGNIFICANT CHANGE UP (ref 3.8–5.2)
RBC # BLD: 4.65 M/UL — SIGNIFICANT CHANGE UP (ref 3.8–5.2)
RBC # FLD: 15.1 % — HIGH (ref 10.3–14.5)
RBC # FLD: 15.1 % — HIGH (ref 10.3–14.5)
SODIUM SERPL-SCNC: 144 MMOL/L — SIGNIFICANT CHANGE UP (ref 135–145)
SODIUM SERPL-SCNC: 144 MMOL/L — SIGNIFICANT CHANGE UP (ref 135–145)
SPECIMEN SOURCE: SIGNIFICANT CHANGE UP
WBC # BLD: 10.38 K/UL — SIGNIFICANT CHANGE UP (ref 3.8–10.5)
WBC # BLD: 10.38 K/UL — SIGNIFICANT CHANGE UP (ref 3.8–10.5)
WBC # FLD AUTO: 10.38 K/UL — SIGNIFICANT CHANGE UP (ref 3.8–10.5)
WBC # FLD AUTO: 10.38 K/UL — SIGNIFICANT CHANGE UP (ref 3.8–10.5)

## 2023-12-25 PROCEDURE — 99232 SBSQ HOSP IP/OBS MODERATE 35: CPT

## 2023-12-25 RX ORDER — SODIUM,POTASSIUM PHOSPHATES 278-250MG
1 POWDER IN PACKET (EA) ORAL ONCE
Refills: 0 | Status: COMPLETED | OUTPATIENT
Start: 2023-12-25 | End: 2023-12-25

## 2023-12-25 RX ADMIN — Medication 100 MILLIGRAM(S): at 11:32

## 2023-12-25 RX ADMIN — Medication 1 APPLICATION(S): at 11:56

## 2023-12-25 RX ADMIN — Medication 500 MILLIGRAM(S): at 11:32

## 2023-12-25 RX ADMIN — Medication 1 TABLET(S): at 11:32

## 2023-12-25 RX ADMIN — SERTRALINE 25 MILLIGRAM(S): 25 TABLET, FILM COATED ORAL at 11:32

## 2023-12-25 RX ADMIN — POLYETHYLENE GLYCOL 3350 17 GRAM(S): 17 POWDER, FOR SOLUTION ORAL at 11:32

## 2023-12-25 RX ADMIN — SENNA PLUS 2 TABLET(S): 8.6 TABLET ORAL at 22:16

## 2023-12-25 RX ADMIN — Medication 112 MICROGRAM(S): at 05:54

## 2023-12-25 RX ADMIN — Medication 1 MILLIGRAM(S): at 11:32

## 2023-12-25 RX ADMIN — ENOXAPARIN SODIUM 40 MILLIGRAM(S): 100 INJECTION SUBCUTANEOUS at 05:54

## 2023-12-25 RX ADMIN — Medication 1 PACKET(S): at 11:31

## 2023-12-25 NOTE — PROGRESS NOTE ADULT - PROBLEM SELECTOR PLAN 1
P/w progressively worsening lethargy/encephalopathy i/s/o no PO intake for 2-3 days; at baseline, answers simple questions (Y/N, name) per son. Most likely ddx include metabolic (hypernatremia) vs infection (c/f UTI) vs mixed    - Currently AOx0, non-verbal, opens eyes to voice, unable to maintain attention or follow commands  - Manage chronic hypernatremia (see the specific section of the plan)  - now completed UTI treatment course

## 2023-12-25 NOTE — PROGRESS NOTE ADULT - ASSESSMENT
68F with PMH significant for breast cancer s/p L mastectomy, TBI (s/p a fall ~2 years ago) c/b aphasia, meningioma, hypothyroidism (hx Grave's dz s/p radioactive iodine), CVA, and dysphagia who presents to [facility] ED on 12/20/2023 with no PO intake and increased lethargy in the last 2-3 days, admitted for hypernatremia 166 and UTI.    68F with PMH significant for breast cancer s/p L mastectomy, TBI (s/p a fall ~2 years ago) c/b aphasia, meningioma, hypothyroidism (hx Grave's dz s/p radioactive iodine), CVA, and dysphagia who presents to [facility] ED on 12/20/2023 with no PO intake and increased lethargy in the last 2-3 days, admitted for hypernatremia 166 and UTI.

## 2023-12-25 NOTE — PROGRESS NOTE ADULT - PROBLEM SELECTOR PLAN 3
Hx of dysphagia since 2-3 months ago per son  - Notable for severe b/l ext contracture and cachexia  - Low PO intake at baseline, no PO intake in the last 2-3 days  - NGT placed 12/22   - per discussion w/  who is HCP, feeding tube is in line w/ patient's wishes.   - palliative care consult Hx of dysphagia since 2-3 months ago per son  - Notable for severe b/l ext contracture and cachexia  - Low PO intake at baseline, no PO intake in the last 2-3 days  - NGT placed 12/22, trickle feeds   - per discussion w/  who is HCP, feeding tube is in line w/ patient's wishes.   - palliative care consult  - monitor for refeeding syndrome Hx of dysphagia since 2-3 months ago per son. Notable for severe b/l ext contracture and cachexia. Low PO intake at baseline, no PO intake in the last 2-3 days. NGT placed 12/22, trickle feeds.     - pt hyperglycemic on jevity, will reach out to dietician for further recs   - per discussion w/  who is HCP, feeding tube is in line w/ patient's wishes.   - palliative care consult  - monitor for refeeding syndrome

## 2023-12-25 NOTE — PROGRESS NOTE ADULT - SUBJECTIVE AND OBJECTIVE BOX
*******************************  Usha Reyes MD (PGY-1)  Internal Medicine  Contact via [employer] TEAMS  *******************************    YOUNGER, JONATHAN  68y  Female    Patient is a 68y old  Female who presents with a chief complaint of No PO intake, lethargy (24 Dec 2023 07:05)      Subjective:    Objective:  T(C): 36.8 (12-25-23 @ 05:20), Max: 36.9 (12-24-23 @ 12:23)  HR: 105 (12-25-23 @ 05:20) (97 - 105)  BP: 90/60 (12-25-23 @ 05:20) (90/60 - 100/69)  RR: 18 (12-25-23 @ 05:20) (18 - 18)  SpO2: 94% (12-25-23 @ 05:20) (92% - 96%)  I&O's Summary      PHYSICAL EXAM:  GENERAL: NAD, well-groomed, well-developed  HEAD:  Atraumatic, Normocephalic  EYES: EOMI, PERRLA, conjunctiva and sclera clear  ENMT: No tonsillar erythema, exudates, or enlargement; Moist mucous membranes, Good dentition, No lesions  NECK: Supple, No JVD, Normal thyroid  NERVOUS SYSTEM:  Alert & Oriented X3, Good concentration; Motor Strength 5/5 B/L upper and lower extremities; DTRs 2+ intact and symmetric  CHEST/LUNG: Clear to auscultation bilaterally; No rales, rhonchi, wheezing, or rubs  HEART: Regular rate and rhythm; No murmurs, rubs, or gallops  ABDOMEN: Soft, Nontender, Nondistended; Bowel sounds present  EXTREMITIES:  2+ Peripheral Pulses, No clubbing, cyanosis, or edema  LYMPH: No lymphadenopathy noted  SKIN: No rashes or lesions    LABS:      CAPILLARY BLOOD GLUCOSE      POCT Blood Glucose.: 222 mg/dL (25 Dec 2023 05:23)  POCT Blood Glucose.: 206 mg/dL (25 Dec 2023 00:55)  POCT Blood Glucose.: 245 mg/dL (24 Dec 2023 17:55)  POCT Blood Glucose.: 241 mg/dL (24 Dec 2023 12:00)      RADIOLOGY & ADDITIONAL TESTS:    MEDICATIONS  (STANDING):  ascorbic acid 500 milliGRAM(s) Oral daily  collagenase Ointment 1 Application(s) Topical daily  enoxaparin Injectable 40 milliGRAM(s) SubCutaneous every 24 hours  folic acid 1 milliGRAM(s) Oral daily  levothyroxine 112 MICROGram(s) Oral daily  multivitamin 1 Tablet(s) Oral daily  polyethylene glycol 3350 17 Gram(s) Oral daily  senna 2 Tablet(s) Oral at bedtime  sertraline 25 milliGRAM(s) Oral daily  thiamine 100 milliGRAM(s) Oral daily    MEDICATIONS  (PRN):             *******************************  Usha Reyes MD (PGY-1)  Internal Medicine  Contact via [employer] TEAMS  *******************************    YOUNGER, JONATHAN  68y  Female    Patient is a 68y old  Female who presents with a chief complaint of No PO intake, lethargy (24 Dec 2023 07:05)    Subjective: Seen at bedside this morning. Patient unable to participate in history taking or physical exam.     Objective:  T(C): 36.8 (12-25-23 @ 05:20), Max: 36.9 (12-24-23 @ 12:23)  HR: 105 (12-25-23 @ 05:20) (97 - 105)  BP: 90/60 (12-25-23 @ 05:20) (90/60 - 100/69)  RR: 18 (12-25-23 @ 05:20) (18 - 18)  SpO2: 94% (12-25-23 @ 05:20) (92% - 96%)  I&O's Summary    PHYSICAL EXAM:  GENERAL: NAD, cachetic, chronically ill appearing  HEAD:  Atraumatic, Normocephalic  EYES: EOMI, PERRLA, conjunctiva and sclera clear  ENMT: NG tube in place, dry mucous membranes, poor dentition   NECK: Supple, trachea midline  NERVOUS SYSTEM:  Alert & Oriented X0, opens eyes to voice, unable to track, answer questions, follow commands   CHEST/LUNG: Clear to auscultation bilaterally; No rales, rhonchi, wheezing, or rubs  HEART: Regular rate and rhythm; No murmurs, rubs, or gallops  ABDOMEN: Soft, Nontender, Nondistended; Bowel sounds present  EXTREMITIES:  2+ Peripheral Pulses, contracted, no peripheral edema   LYMPH: No lymphadenopathy noted  SKIN: No rashes or lesions    LABS:                        12.4   10.38 )-----------( 158      ( 25 Dec 2023 07:21 )             39.4     12-25    144  |  110<H>  |  15  ----------------------------<  212<H>  4.3   |  23  |  0.55    Ca    8.2<L>      25 Dec 2023 07:21  Phos  2.1     12-25  Mg     2.1     12-25    TPro  5.9<L>  /  Alb  2.8<L>  /  TBili  0.6  /  DBili  x   /  AST  13  /  ALT  18  /  AlkPhos  81  12-25        Lactate Trend  12-20 @ 15:24 Lactate:1.3     Culture Results:   >100,000 CFU/ml Escherichia coli (12-20 @ 11:54)  Culture Results:   No growth at 4 days (12-20 @ 11:30)  Culture Results:   No growth at 4 days (12-20 @ 11:15)    CAPILLARY BLOOD GLUCOSE  POCT Blood Glucose.: 222 mg/dL (25 Dec 2023 05:23)  POCT Blood Glucose.: 206 mg/dL (25 Dec 2023 00:55)  POCT Blood Glucose.: 245 mg/dL (24 Dec 2023 17:55)  POCT Blood Glucose.: 241 mg/dL (24 Dec 2023 12:00)    RADIOLOGY & ADDITIONAL TESTS:    MEDICATIONS  (STANDING):  ascorbic acid 500 milliGRAM(s) Oral daily  collagenase Ointment 1 Application(s) Topical daily  enoxaparin Injectable 40 milliGRAM(s) SubCutaneous every 24 hours  folic acid 1 milliGRAM(s) Oral daily  levothyroxine 112 MICROGram(s) Oral daily  multivitamin 1 Tablet(s) Oral daily  polyethylene glycol 3350 17 Gram(s) Oral daily  senna 2 Tablet(s) Oral at bedtime  sertraline 25 milliGRAM(s) Oral daily  thiamine 100 milliGRAM(s) Oral daily    MEDICATIONS  (PRN):

## 2023-12-25 NOTE — PROGRESS NOTE ADULT - PROBLEM SELECTOR PLAN 4
Hx of recurrent UTI (last admission 9/2023). Recent urine culture showing 100k Ecoli.   - Positive UA, s/p IV CTX 1 mg x1 in ED  - completed 3 day course 12/21-23

## 2023-12-25 NOTE — CHART NOTE - NSCHARTNOTEFT_GEN_A_CORE
Brief Nutrition Note    Verbal Consult received for TF recs    Pt is a 69 y/o F with PMH: "breast cancer s/p L mastectomy, TBI (s/p a fall ~2 years ago) c/b aphasia, meningioma, hypothyroidism (hx Grave's dz s/p radioactive iodine), CVA, and dysphagia who presents to [facility] ED on 12/20/2023 with no PO intake and increased lethargy in the last 2-3 days, admitted for hypernatremia 166 and UTI." NGT placed 12/22. No evidence of refeeding syndrome.    Diet, NPO with Tube Feed:   Tube Feeding Modality: Nasogastric  Jevity 1.2 Vidal (JEVITY1.2RTH)  Total Volume for 24 Hours (mL): 1320  Continuous  Starting Tube Feed Rate {mL per Hour}: 10  Increase Tube Feed Rate by (mL): 10     Every 6 hours  Until Goal Tube Feed Rate (mL per Hour): 55  Tube Feed Duration (in Hours): 24  Tube Feed Start Time: 00:00  Carlos(7 Gm Arginine/7 Gm Glut/1.2 Gm HMB     Qty per Day:  2 (12-23-23 @ 09:14) [Active]    Labs reviewed - fingersticks >200. Per discussion with RN, Pt tolerating TFs at goal without evidence of intolerance. Getting Miralax via NGT today. Last BM documented 12/24.    Estimated Needs: based on IBW 52.1 kg  Energy: 5487-1426 kcal/day (30-35 kcal/kg)  Protein: 62-83 g Pro/day (1.2-1.6 g Pro/kg)  Fluid: defer to MD team    Recommendations:  1) change TF formula to Glucerna 1.2, run at goal rate of 55 mL/hr x 24hrs via NGT (1584 kcal, 79.2 g Pro, 1062.6 mL water in 1320 mL total volume) - will meet 100% of estimated calorie/protein needs  2) Free water flushes per MD team  3) continue Carlos 2x/day for wound healing  4) RD to f/u per protocol and PRN    Destinee Leblanc MPH, RD, CDN - TEAMS Brief Nutrition Note    Verbal Consult received for TF recs    Pt is a 69 y/o F with PMH: "breast cancer s/p L mastectomy, TBI (s/p a fall ~2 years ago) c/b aphasia, meningioma, hypothyroidism (hx Grave's dz s/p radioactive iodine), CVA, and dysphagia who presents to [facility] ED on 12/20/2023 with no PO intake and increased lethargy in the last 2-3 days, admitted for hypernatremia 166 and UTI." NGT placed 12/22. No evidence of refeeding syndrome.    Diet, NPO with Tube Feed:   Tube Feeding Modality: Nasogastric  Jevity 1.2 Vidal (JEVITY1.2RTH)  Total Volume for 24 Hours (mL): 1320  Continuous  Starting Tube Feed Rate {mL per Hour}: 10  Increase Tube Feed Rate by (mL): 10     Every 6 hours  Until Goal Tube Feed Rate (mL per Hour): 55  Tube Feed Duration (in Hours): 24  Tube Feed Start Time: 00:00  Carlos(7 Gm Arginine/7 Gm Glut/1.2 Gm HMB     Qty per Day:  2 (12-23-23 @ 09:14) [Active]    Labs reviewed - fingersticks >200. Per discussion with RN, Pt tolerating TFs at goal without evidence of intolerance. Getting Miralax via NGT today. Last BM documented 12/24.    Estimated Needs: based on IBW 52.1 kg  Energy: 3668-0049 kcal/day (30-35 kcal/kg)  Protein: 62-83 g Pro/day (1.2-1.6 g Pro/kg)  Fluid: defer to MD team    Recommendations:  1) change TF formula to Glucerna 1.2, run at goal rate of 55 mL/hr x 24hrs via NGT (1584 kcal, 79.2 g Pro, 1062.6 mL water in 1320 mL total volume) - will meet 100% of estimated calorie/protein needs  2) Free water flushes per MD team  3) continue Carlos 2x/day for wound healing  4) RD to f/u per protocol and PRN    eDstinee Leblanc MPH, RD, CDN - TEAMS

## 2023-12-25 NOTE — PROGRESS NOTE ADULT - ATTENDING COMMENTS
67yo F PMH breast cancer s/p L mastectomy, TBI (s/p a fall ~2 years ago) c/b aphasia, meningioma, hypothyroidism (hx Grave's dz s/p radioactive iodine), CVA, and dysphagia, admission 9/19-9/23/23 for hypernatremia presents to [facility] ED on 12/20/2023 with no PO intake and increased lethargy found to have metabolic encephalopathy due to significant hypernatremia to 166 (checking q6h) and possible UTI, guarded prognosis with malnutrition and multiple pressure ulcers, palliative c/s pending.    1. Acute metabolic encephalopathy: P/w progressively worsening lethargy/encephalopathy i/s/o no PO intake for 2-3 days; at baseline, answers simple questions (Y/N, name) per report. Most likely ddx include metabolic (hypernatremia) vs infection (c/f UTI) vs mixed.  CT head with No acute hemorrhage mass or mass effect is seen. Currently AOx0, non-verbal, opens eyes to voice, unable to maintain attention or follow commands  - Manage chronic hypernatremia as below and UTI as below  - aspiration precautions    2. Hypernatremia  - improved with D5, now dcd    3. UTI: f/u urine culture.  s/p  empiric abx with ceftriaxone 3 day until 12/24    4. Severe malnutrition: unable to participate in speech/swallow eval at this time. per  feeding tube is in line with her wishes, now on NG tube, feeding started, nutrition consulted.   -  if mental status does not improve, will have to pursue PEG tube     5. Pressure ulcer: Multiple pressure ulcers noted on physical exam with varying stages (I-III), some with peeling and discoloration, possible tissue loss  - Son reports pt using Santyl topical at home  - Consult Wound Care.    6. H/o breast ca: s/p L mastectomy  - Holding home Tamoxifen (NPO).    7. Hypothyroid: Hx of Grave's disease s/p radioactive iodine  - Continue home levothyroxine in IV ( mcg --> IV 56 mcg QD)  - TSH wnl    8.  PPX: lovenox    9. Dispo: Per discussion with  Silverio, he wants feeding tubes and states that would be within her wishes.  He is agreeable to palliative consult which is placed 69yo F PMH breast cancer s/p L mastectomy, TBI (s/p a fall ~2 years ago) c/b aphasia, meningioma, hypothyroidism (hx Grave's dz s/p radioactive iodine), CVA, and dysphagia, admission 9/19-9/23/23 for hypernatremia presents to [facility] ED on 12/20/2023 with no PO intake and increased lethargy found to have metabolic encephalopathy due to significant hypernatremia to 166 (checking q6h) and possible UTI, guarded prognosis with malnutrition and multiple pressure ulcers, palliative c/s pending.    1. Acute metabolic encephalopathy: P/w progressively worsening lethargy/encephalopathy i/s/o no PO intake for 2-3 days; at baseline, answers simple questions (Y/N, name) per report. Most likely ddx include metabolic (hypernatremia) vs infection (c/f UTI) vs mixed.  CT head with No acute hemorrhage mass or mass effect is seen. Currently AOx0, non-verbal, opens eyes to voice, unable to maintain attention or follow commands  - Manage chronic hypernatremia as below and UTI as below  - aspiration precautions    2. Hypernatremia  - improved with D5, now dcd    3. UTI: f/u urine culture.  s/p  empiric abx with ceftriaxone 3 day until 12/24    4. Severe malnutrition: unable to participate in speech/swallow eval at this time. per  feeding tube is in line with her wishes, now on NG tube, feeding started, nutrition consulted.   -  if mental status does not improve, will have to pursue PEG tube     5. Pressure ulcer: Multiple pressure ulcers noted on physical exam with varying stages (I-III), some with peeling and discoloration, possible tissue loss  - Son reports pt using Santyl topical at home  - Consult Wound Care.    6. H/o breast ca: s/p L mastectomy  - Holding home Tamoxifen (NPO).    7. Hypothyroid: Hx of Grave's disease s/p radioactive iodine  - Continue home levothyroxine in IV ( mcg --> IV 56 mcg QD)  - TSH wnl    8.  PPX: lovenox    9. Dispo: Per discussion with  Silverio, he wants feeding tubes and states that would be within her wishes.  He is agreeable to palliative consult which is placed

## 2023-12-25 NOTE — PROGRESS NOTE ADULT - PROBLEM SELECTOR PLAN 2
Symptoms of lethargy/encephalopathy >48h with no PO intake, likely has chronic hypernatremia (vs acute) 2/2 severe dehydration. Na 166->156. at goal, then transitioned to 1/4 NS 50cc/hr. repeat Na at midnight 157, continued fluids. Na improved to 141.    - monitor BMP Symptoms of lethargy/encephalopathy >48h with no PO intake, likely has chronic hypernatremia (vs acute) 2/2 severe dehydration. Na 166->156. at goal, then transitioned to 1/4 NS 50cc/hr. repeat Na at midnight 157, continued fluids. Na 144.     - monitor BMP

## 2023-12-26 DIAGNOSIS — Z71.89 OTHER SPECIFIED COUNSELING: ICD-10-CM

## 2023-12-26 DIAGNOSIS — Z51.5 ENCOUNTER FOR PALLIATIVE CARE: ICD-10-CM

## 2023-12-26 DIAGNOSIS — R13.10 DYSPHAGIA, UNSPECIFIED: ICD-10-CM

## 2023-12-26 LAB
ANION GAP SERPL CALC-SCNC: 11 MMOL/L — SIGNIFICANT CHANGE UP (ref 5–17)
ANION GAP SERPL CALC-SCNC: 11 MMOL/L — SIGNIFICANT CHANGE UP (ref 5–17)
BASOPHILS # BLD AUTO: 0.01 K/UL — SIGNIFICANT CHANGE UP (ref 0–0.2)
BASOPHILS # BLD AUTO: 0.01 K/UL — SIGNIFICANT CHANGE UP (ref 0–0.2)
BASOPHILS NFR BLD AUTO: 0.1 % — SIGNIFICANT CHANGE UP (ref 0–2)
BASOPHILS NFR BLD AUTO: 0.1 % — SIGNIFICANT CHANGE UP (ref 0–2)
BUN SERPL-MCNC: 19 MG/DL — SIGNIFICANT CHANGE UP (ref 7–23)
BUN SERPL-MCNC: 19 MG/DL — SIGNIFICANT CHANGE UP (ref 7–23)
CALCIUM SERPL-MCNC: 8.3 MG/DL — LOW (ref 8.4–10.5)
CALCIUM SERPL-MCNC: 8.3 MG/DL — LOW (ref 8.4–10.5)
CHLORIDE SERPL-SCNC: 113 MMOL/L — HIGH (ref 96–108)
CHLORIDE SERPL-SCNC: 113 MMOL/L — HIGH (ref 96–108)
CO2 SERPL-SCNC: 26 MMOL/L — SIGNIFICANT CHANGE UP (ref 22–31)
CO2 SERPL-SCNC: 26 MMOL/L — SIGNIFICANT CHANGE UP (ref 22–31)
CREAT SERPL-MCNC: 0.52 MG/DL — SIGNIFICANT CHANGE UP (ref 0.5–1.3)
CREAT SERPL-MCNC: 0.52 MG/DL — SIGNIFICANT CHANGE UP (ref 0.5–1.3)
EGFR: 100 ML/MIN/1.73M2 — SIGNIFICANT CHANGE UP
EGFR: 100 ML/MIN/1.73M2 — SIGNIFICANT CHANGE UP
EOSINOPHIL # BLD AUTO: 0.01 K/UL — SIGNIFICANT CHANGE UP (ref 0–0.5)
EOSINOPHIL # BLD AUTO: 0.01 K/UL — SIGNIFICANT CHANGE UP (ref 0–0.5)
EOSINOPHIL NFR BLD AUTO: 0.1 % — SIGNIFICANT CHANGE UP (ref 0–6)
EOSINOPHIL NFR BLD AUTO: 0.1 % — SIGNIFICANT CHANGE UP (ref 0–6)
GLUCOSE BLDC GLUCOMTR-MCNC: 145 MG/DL — HIGH (ref 70–99)
GLUCOSE BLDC GLUCOMTR-MCNC: 145 MG/DL — HIGH (ref 70–99)
GLUCOSE BLDC GLUCOMTR-MCNC: 161 MG/DL — HIGH (ref 70–99)
GLUCOSE BLDC GLUCOMTR-MCNC: 161 MG/DL — HIGH (ref 70–99)
GLUCOSE BLDC GLUCOMTR-MCNC: 171 MG/DL — HIGH (ref 70–99)
GLUCOSE BLDC GLUCOMTR-MCNC: 171 MG/DL — HIGH (ref 70–99)
GLUCOSE BLDC GLUCOMTR-MCNC: 174 MG/DL — HIGH (ref 70–99)
GLUCOSE BLDC GLUCOMTR-MCNC: 174 MG/DL — HIGH (ref 70–99)
GLUCOSE SERPL-MCNC: 154 MG/DL — HIGH (ref 70–99)
GLUCOSE SERPL-MCNC: 154 MG/DL — HIGH (ref 70–99)
HCT VFR BLD CALC: 39.1 % — SIGNIFICANT CHANGE UP (ref 34.5–45)
HCT VFR BLD CALC: 39.1 % — SIGNIFICANT CHANGE UP (ref 34.5–45)
HGB BLD-MCNC: 12.4 G/DL — SIGNIFICANT CHANGE UP (ref 11.5–15.5)
HGB BLD-MCNC: 12.4 G/DL — SIGNIFICANT CHANGE UP (ref 11.5–15.5)
IMM GRANULOCYTES NFR BLD AUTO: 0.5 % — SIGNIFICANT CHANGE UP (ref 0–0.9)
IMM GRANULOCYTES NFR BLD AUTO: 0.5 % — SIGNIFICANT CHANGE UP (ref 0–0.9)
LYMPHOCYTES # BLD AUTO: 1 K/UL — SIGNIFICANT CHANGE UP (ref 1–3.3)
LYMPHOCYTES # BLD AUTO: 1 K/UL — SIGNIFICANT CHANGE UP (ref 1–3.3)
LYMPHOCYTES # BLD AUTO: 8.9 % — LOW (ref 13–44)
LYMPHOCYTES # BLD AUTO: 8.9 % — LOW (ref 13–44)
MAGNESIUM SERPL-MCNC: 2.4 MG/DL — SIGNIFICANT CHANGE UP (ref 1.6–2.6)
MAGNESIUM SERPL-MCNC: 2.4 MG/DL — SIGNIFICANT CHANGE UP (ref 1.6–2.6)
MCHC RBC-ENTMCNC: 27.6 PG — SIGNIFICANT CHANGE UP (ref 27–34)
MCHC RBC-ENTMCNC: 27.6 PG — SIGNIFICANT CHANGE UP (ref 27–34)
MCHC RBC-ENTMCNC: 31.7 GM/DL — LOW (ref 32–36)
MCHC RBC-ENTMCNC: 31.7 GM/DL — LOW (ref 32–36)
MCV RBC AUTO: 87.1 FL — SIGNIFICANT CHANGE UP (ref 80–100)
MCV RBC AUTO: 87.1 FL — SIGNIFICANT CHANGE UP (ref 80–100)
MONOCYTES # BLD AUTO: 0.45 K/UL — SIGNIFICANT CHANGE UP (ref 0–0.9)
MONOCYTES # BLD AUTO: 0.45 K/UL — SIGNIFICANT CHANGE UP (ref 0–0.9)
MONOCYTES NFR BLD AUTO: 4 % — SIGNIFICANT CHANGE UP (ref 2–14)
MONOCYTES NFR BLD AUTO: 4 % — SIGNIFICANT CHANGE UP (ref 2–14)
NEUTROPHILS # BLD AUTO: 9.7 K/UL — HIGH (ref 1.8–7.4)
NEUTROPHILS # BLD AUTO: 9.7 K/UL — HIGH (ref 1.8–7.4)
NEUTROPHILS NFR BLD AUTO: 86.4 % — HIGH (ref 43–77)
NEUTROPHILS NFR BLD AUTO: 86.4 % — HIGH (ref 43–77)
NRBC # BLD: 0 /100 WBCS — SIGNIFICANT CHANGE UP (ref 0–0)
NRBC # BLD: 0 /100 WBCS — SIGNIFICANT CHANGE UP (ref 0–0)
PHOSPHATE SERPL-MCNC: 3 MG/DL — SIGNIFICANT CHANGE UP (ref 2.5–4.5)
PHOSPHATE SERPL-MCNC: 3 MG/DL — SIGNIFICANT CHANGE UP (ref 2.5–4.5)
PLATELET # BLD AUTO: 160 K/UL — SIGNIFICANT CHANGE UP (ref 150–400)
PLATELET # BLD AUTO: 160 K/UL — SIGNIFICANT CHANGE UP (ref 150–400)
POTASSIUM SERPL-MCNC: 4 MMOL/L — SIGNIFICANT CHANGE UP (ref 3.5–5.3)
POTASSIUM SERPL-MCNC: 4 MMOL/L — SIGNIFICANT CHANGE UP (ref 3.5–5.3)
POTASSIUM SERPL-SCNC: 4 MMOL/L — SIGNIFICANT CHANGE UP (ref 3.5–5.3)
POTASSIUM SERPL-SCNC: 4 MMOL/L — SIGNIFICANT CHANGE UP (ref 3.5–5.3)
RBC # BLD: 4.49 M/UL — SIGNIFICANT CHANGE UP (ref 3.8–5.2)
RBC # BLD: 4.49 M/UL — SIGNIFICANT CHANGE UP (ref 3.8–5.2)
RBC # FLD: 15.9 % — HIGH (ref 10.3–14.5)
RBC # FLD: 15.9 % — HIGH (ref 10.3–14.5)
SODIUM SERPL-SCNC: 150 MMOL/L — HIGH (ref 135–145)
SODIUM SERPL-SCNC: 150 MMOL/L — HIGH (ref 135–145)
WBC # BLD: 11.23 K/UL — HIGH (ref 3.8–10.5)
WBC # BLD: 11.23 K/UL — HIGH (ref 3.8–10.5)
WBC # FLD AUTO: 11.23 K/UL — HIGH (ref 3.8–10.5)
WBC # FLD AUTO: 11.23 K/UL — HIGH (ref 3.8–10.5)

## 2023-12-26 PROCEDURE — 99222 1ST HOSP IP/OBS MODERATE 55: CPT

## 2023-12-26 PROCEDURE — 71045 X-RAY EXAM CHEST 1 VIEW: CPT | Mod: 26

## 2023-12-26 PROCEDURE — 99232 SBSQ HOSP IP/OBS MODERATE 35: CPT | Mod: GC

## 2023-12-26 RX ADMIN — Medication 100 MILLIGRAM(S): at 11:11

## 2023-12-26 RX ADMIN — Medication 500 MILLIGRAM(S): at 11:11

## 2023-12-26 RX ADMIN — Medication 112 MICROGRAM(S): at 05:17

## 2023-12-26 RX ADMIN — SERTRALINE 25 MILLIGRAM(S): 25 TABLET, FILM COATED ORAL at 11:11

## 2023-12-26 RX ADMIN — Medication 1 APPLICATION(S): at 11:12

## 2023-12-26 RX ADMIN — Medication 1 TABLET(S): at 11:11

## 2023-12-26 RX ADMIN — Medication 1 MILLIGRAM(S): at 11:12

## 2023-12-26 RX ADMIN — ENOXAPARIN SODIUM 40 MILLIGRAM(S): 100 INJECTION SUBCUTANEOUS at 03:25

## 2023-12-26 NOTE — PROGRESS NOTE ADULT - PROBLEM SELECTOR PLAN 3
Hx of dysphagia since 2-3 months ago per son. Notable for severe b/l ext contracture and cachexia. Low PO intake at baseline, no PO intake in the last 2-3 days. NGT placed 12/22, trickle feeds.     - pt hyperglycemic on jevity, will reach out to dietician for further recs   - per discussion w/  who is HCP, feeding tube is in line w/ patient's wishes.   - palliative care consult  - monitor for refeeding syndrome Hx of dysphagia since 2-3 months ago per son. Notable for severe b/l ext contracture and cachexia. Low PO intake at baseline, no PO intake in the last 2-3 days. NGT placed 12/22, trickle feeds.     - pt hyperglycemic on jevity, per nutrition recs -> changed to glucerna 55cc/hr  - per discussion w/  who is HCP, feeding tube is in line w/ patient's wishes.   - f/u palliative care consult  - monitor for refeeding syndrome

## 2023-12-26 NOTE — PROGRESS NOTE ADULT - PROBLEM SELECTOR PLAN 2
Symptoms of lethargy/encephalopathy >48h with no PO intake, likely has chronic hypernatremia (vs acute) 2/2 severe dehydration. Na 166->156. at goal, then transitioned to 1/4 NS 50cc/hr. repeat Na at midnight 157, continued fluids. Na 144.     - monitor BMP Symptoms of lethargy/encephalopathy >48h with no PO intake, likely has chronic hypernatremia (vs acute) 2/2 severe dehydration. Na 166 on admission, improved w/ D5.   Na 150    - monitor BMP daily  - increase free water 200 q8h -> 250 q6h

## 2023-12-26 NOTE — PROGRESS NOTE ADULT - NUTRITIONAL ASSESSMENT
This patient has been assessed with a concern for Malnutrition and has been determined to have a diagnosis/diagnoses of Severe protein-calorie malnutrition and Underweight (BMI < 19).    This patient is being managed with:   Diet NPO with Tube Feed-  Tube Feeding Modality: Nasogastric  Glucerna 1.2 Vidal (GLUCERNARTH)  Total Volume for 24 Hours (mL): 1320  Continuous  Starting Tube Feed Rate {mL per Hour}: 55  Increase Tube Feed Rate by (mL): 0     Every 6 hours  Until Goal Tube Feed Rate (mL per Hour): 55  Tube Feed Duration (in Hours): 24  Tube Feed Start Time: 00:00  Carlos(7 Gm Arginine/7 Gm Glut/1.2 Gm HMB     Qty per Day:  2  Entered: Dec 25 2023  1:45PM

## 2023-12-26 NOTE — CONSULT NOTE ADULT - PROBLEM SELECTOR RECOMMENDATION 9
currently with NGT  S&S evaluation - likely to be advanced to modified diet but concern remains for limited intake  calorie count  may still need PEG supplement vs. transition in focus of care- to be discussed

## 2023-12-26 NOTE — PROGRESS NOTE ADULT - PROBLEM SELECTOR PLAN 5
Multiple pressure ulcers noted on physical exam with varying stages (I-III), some with peeling and discoloration, possible tissue loss  - Son reports pt using Santyl topical at home  - Consult Wound Care Multiple pressure ulcers noted on physical exam with varying stages (I-III), some with peeling and discoloration, possible tissue loss  - Son reports pt using Santyl topical at home  - wound care consulted, appreciate recs

## 2023-12-26 NOTE — PROGRESS NOTE ADULT - ASSESSMENT
68F with PMH significant for breast cancer s/p L mastectomy, TBI (s/p a fall ~2 years ago) c/b aphasia, meningioma, hypothyroidism (hx Grave's dz s/p radioactive iodine), CVA, and dysphagia who presents to [facility] ED on 12/20/2023 with no PO intake and increased lethargy in the last 2-3 days, admitted for hypernatremia 166 and UTI.    68F with PMH significant for breast cancer s/p L mastectomy, TBI (s/p a fall ~2 years ago) c/b aphasia, meningioma, hypothyroidism (hx Grave's dz s/p radioactive iodine), CVA, and dysphagia who presents to [facility] ED on 12/20/2023 with no PO intake and increased lethargy in the last 2-3 days, admitted for hypernatremia 166 and UTI.

## 2023-12-26 NOTE — CONSULT NOTE ADULT - SUBJECTIVE AND OBJECTIVE BOX
Statement Selected HPI:  68F with PMH significant for breast cancer s/p L mastectomy, TBI (s/p a fall ~2 years ago) c/b aphasia, meningioma, hypothyroidism (hx Grave's dz s/p radioactive iodine), CVA, and dysphagia who presents to [facility] ED on 12/20/2023 with no PO intake and increased lethargy in the last 2-3 days.    On encounter, pt is unable to communicate verbally, only opens eyes to voice. Unable to maintain attention or follow commands at this time. Tried contacting  (HCP), unable to reach at this time; called the son (Henok) to obtain collateral HPI. Per son, pt was verbal up to 2-3 days ago with prompting, usually able to answer Y/N questions or say her name. Pt was also able to tolerate thick liquids such as sauce. Approx 2-3 days ago, pt started not tolerating any PO intake, became significantly more lethargic at home, and was brought to the ED. Pt lives at home with , and is able to walk with a walker and a personal assistance. Reported to have relative left-sided weakness since the fall that led to TBI. Unable to assess ROS currently d/t encephalopathy/lethargy.    Vital signs stable with mild hypotension (T 36.6C, HR 78, BP 90/60, on RA, SpO2 >96%). Initial workup in the ED notable for borderline leukocytosis 10.87 with PMN predominance, significant hypernatremia 160 -> 166 (checking q6h), elevated procal 0.12, and positive UA (dark yellow, cloudy, +ketone, nitrite, 7 WBC, many bacteria). CXR with enlarged cardiomediastinal silhouette and L base atelectasis; no focal consolidations, pleural effusion, or pneumothorax. Lactate 1.3, negative RVP, and CTH negative for acute intracranial pathology (redemonstration of parenchymal volume loss + chronic microvascular changes).    Pt was given empiric IV CTX 1 g x1 and NS bolus 1L in the ED. Blood and urine cultures have been sent. (20 Dec 2023 21:57)    PERTINENT PM/SXH:   Hypothyroidism    Hyperthyroidism    Breast cancer    SVT (supraventricular tachycardia)    SAH (subarachnoid hemorrhage)    Multiple falls      Salivary Gland Disease    C Section    Abnormality, eye    S/P D&C (status post dilation and curettage)    H/O left mastectomy      FAMILY HISTORY:  No pertinent family history in first degree relatives      Family Hx substance abuse [ ]yes [x ]no  ITEMS NOT CHECKED ARE NOT PRESENT    SOCIAL HISTORY:   Significant other/partner[x ]  Children[ x]  Voodoo/Spirituality:  Substance hx:  [ ]   Tobacco hx:  [ ]   Alcohol hx: [ ]   Home Opioid hx:  [ ] I-Stop Reference No:  Living Situation: [x ]Home  [ ]Long term care  [ ]Rehab [ ]Other    ADVANCE DIRECTIVES:    DNR/MOLST  [ ]  Living Will  [ ]   DECISION MAKER(s):  [ ] Health Care Proxy(s)  [ x] Surrogate(s)  [ ] Guardian           Name(s): Phone Number(s): Silverio, spouse, number per EMR    BASELINE (I)ADL(s) (prior to admission):  [place]: [ ]Total  [ ] Moderate [ x]Dependent    Allergies    Tapazole (Hives)    Intolerances    MEDICATIONS  (STANDING):  ascorbic acid 500 milliGRAM(s) Oral daily  collagenase Ointment 1 Application(s) Topical daily  enoxaparin Injectable 40 milliGRAM(s) SubCutaneous every 24 hours  folic acid 1 milliGRAM(s) Oral daily  levothyroxine 112 MICROGram(s) Oral daily  multivitamin 1 Tablet(s) Oral daily  polyethylene glycol 3350 17 Gram(s) Oral daily  senna 2 Tablet(s) Oral at bedtime  sertraline 25 milliGRAM(s) Oral daily  thiamine 100 milliGRAM(s) Oral daily    MEDICATIONS  (PRN):    PRESENT SYMPTOMS: [ ]Unable to self-report  [ ] CPOT [ x] PAINADs [ x] RDOS  Source if other than patient:  [ ]Family   [ ]Team     Pain: [ ]yes [ ]no  QOL impact -   Location -                    Aggravating factors -  Quality -  Radiation -  Timing-  Severity (0-10 scale):  Minimal acceptable level (0-10 scale):     CPOT:    https://www.sccm.org/getattachment/[email or web address]-[email or web address]-[email or web address]-4770t4032a7h/Critical-Care-Pain-Observation-Tool-(CPOT)    PAIN AD Score:   http://geriatrictoolkit.[facility]/cog/painad.pdf (press ctrl +  left click to view)    Dyspnea:                           [ ]Mild [ ]Moderate [ ]Severe      RDOS:  0 to 2  minimal or no respiratory distress   3  mild distress  4 to 6 moderate distress  >7 severe distress  https://[email or web address].[email or web address]/handouts/hen/Respiratory_Distress_Observation_Scale.pdf (Ctrl +  left click to view)     Anxiety:                             [ ]Mild [ ]Moderate [ ]Severe  Fatigue:                             [ ]Mild [ ]Moderate [ ]Severe  Nausea:                             [ ]Mild [ ]Moderate [ ]Severe  Loss of appetite:              [ ]Mild [ ]Moderate [ ]Severe  Constipation:                    [ ]Mild [ ]Moderate [ ]Severe    PCSSQ[Palliative Care Spiritual Screening Question]   Severity (0-10):  Score of 4 or > indicate consideration of Chaplaincy referral.  Chaplaincy Referral: [ ] yes [ ] refused [ ] following [ x] Deferred     Caregiver [place]? : [ ] yes [ ] no [x ] Deferred [ ] Declined             Social work referral [ ] Patient & Family Centered Care Referral [ ]     Anticipatory Grief present?:  [ ] yes [ ] no  [x ] Deferred                  Social work referral [ ] Chaplaincy Referral[ ]      Other Symptoms:  [ ]All other review of systems negative     Palliative Performance Status Version 2:       10  %    http://npcrc.org/files/news/palliative_performance_scale_ppsv2.pdf  PHYSICAL EXAM:  Vital Signs Last 24 Hrs  T(C): 36.8 (26 Dec 2023 11:45), Max: 36.9 (25 Dec 2023 20:55)  T(F): 98.2 (26 Dec 2023 11:45), Max: 98.5 (25 Dec 2023 20:55)  HR: 92 (26 Dec 2023 11:45) (92 - 98)  BP: 91/57 (26 Dec 2023 11:45) (91/57 - 100/66)  BP(mean): --  RR: 16 (26 Dec 2023 11:45) (16 - 18)  SpO2: 95% (26 Dec 2023 11:45) (95% - 95%)    Parameters below as of 26 Dec 2023 11:45  Patient On (Oxygen Delivery Method): room air     I&O's Summary    GENERAL: [x ]Cachexia    [ ]Alert  [ ]Oriented x   [ x]Lethargic  [ ]Unarousable  [ ]Verbal  [ ]Non-Verbal  Behavioral:   [ ] Anxiety  [ ] Delirium [ ] Agitation [ ] Other  HEENT:  [ ]Normal   [x ]Dry mouth   [ ]ET Tube/Trach  [ ]Oral lesions  PULMONARY:   [x ]Clear [ ]Tachypnea  [ ]Audible excessive secretions   [ ]Rhonchi        [ ]Right [ ]Left [ ]Bilateral  [ ]Crackles        [ ]Right [ ]Left [ ]Bilateral  [ ]Wheezing     [ ]Right [ ]Left [ ]Bilateral  [ ]Diminished breath sounds [ ]right [ ]left [ ]bilateral  CARDIOVASCULAR:    [x ]Regular [ ]Irregular [ ]Tachy  [ ]James [ ]Murmur [ ]Other  GASTROINTESTINAL:  [ x]Soft  [ ]Distended   [ ]+BS  [ ]Non tender [ ]Tender  [ ]Other [ ]PEG [x ]OGT/ NGT  Last BM:  GENITOURINARY:  [ ]Normal [x ] Incontinent   [ ]Oliguria/Anuria   [ ]Spangler  MUSCULOSKELETAL:   [ ]Normal   [ ]Weakness  [x ]Bed/Wheelchair bound [ ]Edema  NEUROLOGIC:   [ ]No focal deficits  x[ ]Cognitive impairment  [x ]Dysphagia [ x]Dysarthria [ ]Paresis [ ]Other   SKIN:   [ ]Normal  [ ]Rash  [ ]Other  [x ]Pressure ulcer(s)- multiple pressure ulcers       Present on admission [x ]y [ ]n    CRITICAL CARE:  [ ] Shock Present  [ ]Septic [ ]Cardiogenic [ ]Neurologic [ ]Hypovolemic  [ ]  Vasopressors [ ]  Inotropes   [ ]Respiratory failure present [ ]Mechanical ventilation [ ]Non-invasive ventilatory support [ ]High flow    [ ]Acute  [ ]Chronic [ ]Hypoxic  [ ]Hypercarbic [ ]Other  [ ]Other organ failure     LABS:                        12.4   11.23 )-----------( 160      ( 26 Dec 2023 07:27 )             39.1   12-26    150<H>  |  113<H>  |  19  ----------------------------<  154<H>  4.0   |  26  |  0.52    Ca    8.3<L>      26 Dec 2023 07:25  Phos  3.0     12-26  Mg     2.4     12-26    TPro  5.9<L>  /  Alb  2.8<L>  /  TBili  0.6  /  DBili  x   /  AST  13  /  ALT  18  /  AlkPhos  81  12-25      Urinalysis Basic - ( 26 Dec 2023 07:25 )    Color: x / Appearance: x / SG: x / pH: x  Gluc: 154 mg/dL / Ketone: x  / Bili: x / Urobili: x   Blood: x / Protein: x / Nitrite: x   Leuk Esterase: x / RBC: x / WBC x   Sq Epi: x / Non Sq Epi: x / Bacteria: x      RADIOLOGY & ADDITIONAL STUDIES:  < from: CT Head No Cont (12.20.23 @ 12:24) >    ACC: [id number] EXAM:  CT BRAIN   ORDERED BY: SHARI EISENBERG     PROCEDURE DATE:  12/20/2023          INTERPRETATION:  Clinical indication: Altered mental status    Multiple axial sections performed from the base of skull to vertex   without contrast enhancement. Coronal and sagittal reconstructions were   performed    This exam is compared prior head CT performed on September 19, 2023    Parenchymal volume loss and chronic microvascular changes are again seen.    There is no acute hemorrhage mass or mass effect seen.    Evaluation of the osseous structures with appropriate window appears   normal.    IMPRESSION: No acute hemorrhage mass or mass effect is seen.    --- End of Report ---        YANNI MCNEAL MD; Attending Radiologist  This document has been electronically signed. Dec 20 2023 12:26PM    < end of copied text >      PROTEIN CALORIE MALNUTRITION PRESENT: [ ]mild [ ]moderate [ ]severe [ ]underweight [ ]morbid obesity  https://www.andeal.org/vault/2440/web/files/ONC/Table_Clinical%20Characteristics%20to%20Document%20Malnutrition-White%20JV%20et%20al%202012.pdf    Height (cm): 160 (12-20-23 @ 10:46), 167.6 (09-18-23 @ 20:11), 165.1 (03-17-23 @ 08:17)  Weight (kg): 40.8 (12-20-23 @ 10:46), 49.9 (09-18-23 @ 20:11), 54.4 (03-17-23 @ 08:17)  BMI (kg/m2): 15.9 (12-20-23 @ 10:46), 17.8 (09-18-23 @ 20:11), 20 (03-17-23 @ 08:17)    [x ]PPSV2 < or = to 30% [ ]significant weight loss  [x ]poor nutritional intake  [ ]anasarca[x ]Artificial Nutrition      Other REFERRALS:  [ ]Hospice  [ ]Child Life  [x ]Social Work  [ ]Case management [ ]Holistic Therapy     Goals of Care Document:  HPI:  68F with PMH significant for breast cancer s/p L mastectomy, TBI (s/p a fall ~2 years ago) c/b aphasia, meningioma, hypothyroidism (hx Grave's dz s/p radioactive iodine), CVA, and dysphagia who presents to [facility] ED on 12/20/2023 with no PO intake and increased lethargy in the last 2-3 days.    On encounter, pt is unable to communicate verbally, only opens eyes to voice. Unable to maintain attention or follow commands at this time. Tried contacting  (HCP), unable to reach at this time; called the son (Henok) to obtain collateral HPI. Per son, pt was verbal up to 2-3 days ago with prompting, usually able to answer Y/N questions or say her name. Pt was also able to tolerate thick liquids such as sauce. Approx 2-3 days ago, pt started not tolerating any PO intake, became significantly more lethargic at home, and was brought to the ED. Pt lives at home with , and is able to walk with a walker and a personal assistance. Reported to have relative left-sided weakness since the fall that led to TBI. Unable to assess ROS currently d/t encephalopathy/lethargy.    Vital signs stable with mild hypotension (T 36.6C, HR 78, BP 90/60, on RA, SpO2 >96%). Initial workup in the ED notable for borderline leukocytosis 10.87 with PMN predominance, significant hypernatremia 160 -> 166 (checking q6h), elevated procal 0.12, and positive UA (dark yellow, cloudy, +ketone, nitrite, 7 WBC, many bacteria). CXR with enlarged cardiomediastinal silhouette and L base atelectasis; no focal consolidations, pleural effusion, or pneumothorax. Lactate 1.3, negative RVP, and CTH negative for acute intracranial pathology (redemonstration of parenchymal volume loss + chronic microvascular changes).    Pt was given empiric IV CTX 1 g x1 and NS bolus 1L in the ED. Blood and urine cultures have been sent. (20 Dec 2023 21:57)    PERTINENT PM/SXH:   Hypothyroidism    Hyperthyroidism    Breast cancer    SVT (supraventricular tachycardia)    SAH (subarachnoid hemorrhage)    Multiple falls      Salivary Gland Disease    C Section    Abnormality, eye    S/P D&C (status post dilation and curettage)    H/O left mastectomy      FAMILY HISTORY:  No pertinent family history in first degree relatives      Family Hx substance abuse [ ]yes [x ]no  ITEMS NOT CHECKED ARE NOT PRESENT    SOCIAL HISTORY:   Significant other/partner[x ]  Children[ x]  Latter day/Spirituality:  Substance hx:  [ ]   Tobacco hx:  [ ]   Alcohol hx: [ ]   Home Opioid hx:  [ ] I-Stop Reference No:  Living Situation: [x ]Home  [ ]Long term care  [ ]Rehab [ ]Other    ADVANCE DIRECTIVES:    DNR/MOLST  [ ]  Living Will  [ ]   DECISION MAKER(s):  [ ] Health Care Proxy(s)  [ x] Surrogate(s)  [ ] Guardian           Name(s): Phone Number(s): Silverio, spouse, number per EMR    BASELINE (I)ADL(s) (prior to admission):  [place]: [ ]Total  [ ] Moderate [ x]Dependent    Allergies    Tapazole (Hives)    Intolerances    MEDICATIONS  (STANDING):  ascorbic acid 500 milliGRAM(s) Oral daily  collagenase Ointment 1 Application(s) Topical daily  enoxaparin Injectable 40 milliGRAM(s) SubCutaneous every 24 hours  folic acid 1 milliGRAM(s) Oral daily  levothyroxine 112 MICROGram(s) Oral daily  multivitamin 1 Tablet(s) Oral daily  polyethylene glycol 3350 17 Gram(s) Oral daily  senna 2 Tablet(s) Oral at bedtime  sertraline 25 milliGRAM(s) Oral daily  thiamine 100 milliGRAM(s) Oral daily    MEDICATIONS  (PRN):    PRESENT SYMPTOMS: [ ]Unable to self-report  [ ] CPOT [ x] PAINADs [ x] RDOS  Source if other than patient:  [ ]Family   [ ]Team     Pain: [ ]yes [ ]no  QOL impact -   Location -                    Aggravating factors -  Quality -  Radiation -  Timing-  Severity (0-10 scale):  Minimal acceptable level (0-10 scale):     CPOT:    https://www.sccm.org/getattachment/[email or web address]-[email or web address]-9u0j-6226h0692s7q/Critical-Care-Pain-Observation-Tool-(CPOT)    PAIN AD Score:   http://geriatrictoolkit.[facility]/cog/painad.pdf (press ctrl +  left click to view)    Dyspnea:                           [ ]Mild [ ]Moderate [ ]Severe      RDOS:  0 to 2  minimal or no respiratory distress   3  mild distress  4 to 6 moderate distress  >7 severe distress  https://homecareinformation.net/handouts/hen/Respiratory_Distress_Observation_Scale.pdf (Ctrl +  left click to view)     Anxiety:                             [ ]Mild [ ]Moderate [ ]Severe  Fatigue:                             [ ]Mild [ ]Moderate [ ]Severe  Nausea:                             [ ]Mild [ ]Moderate [ ]Severe  Loss of appetite:              [ ]Mild [ ]Moderate [ ]Severe  Constipation:                    [ ]Mild [ ]Moderate [ ]Severe    PCSSQ[Palliative Care Spiritual Screening Question]   Severity (0-10):  Score of 4 or > indicate consideration of Chaplaincy referral.  Chaplaincy Referral: [ ] yes [ ] refused [ ] following [ x] Deferred     Caregiver [place]? : [ ] yes [ ] no [x ] Deferred [ ] Declined             Social work referral [ ] Patient & Family Centered Care Referral [ ]     Anticipatory Grief present?:  [ ] yes [ ] no  [x ] Deferred                  Social work referral [ ] Chaplaincy Referral[ ]      Other Symptoms:  [ ]All other review of systems negative     Palliative Performance Status Version 2:       10  %    http://npcrc.org/files/news/palliative_performance_scale_ppsv2.pdf  PHYSICAL EXAM:  Vital Signs Last 24 Hrs  T(C): 36.8 (26 Dec 2023 11:45), Max: 36.9 (25 Dec 2023 20:55)  T(F): 98.2 (26 Dec 2023 11:45), Max: 98.5 (25 Dec 2023 20:55)  HR: 92 (26 Dec 2023 11:45) (92 - 98)  BP: 91/57 (26 Dec 2023 11:45) (91/57 - 100/66)  BP(mean): --  RR: 16 (26 Dec 2023 11:45) (16 - 18)  SpO2: 95% (26 Dec 2023 11:45) (95% - 95%)    Parameters below as of 26 Dec 2023 11:45  Patient On (Oxygen Delivery Method): room air     I&O's Summary    GENERAL: [x ]Cachexia    [ ]Alert  [ ]Oriented x   [ x]Lethargic  [ ]Unarousable  [ ]Verbal  [ ]Non-Verbal  Behavioral:   [ ] Anxiety  [ ] Delirium [ ] Agitation [ ] Other  HEENT:  [ ]Normal   [x ]Dry mouth   [ ]ET Tube/Trach  [ ]Oral lesions  PULMONARY:   [x ]Clear [ ]Tachypnea  [ ]Audible excessive secretions   [ ]Rhonchi        [ ]Right [ ]Left [ ]Bilateral  [ ]Crackles        [ ]Right [ ]Left [ ]Bilateral  [ ]Wheezing     [ ]Right [ ]Left [ ]Bilateral  [ ]Diminished breath sounds [ ]right [ ]left [ ]bilateral  CARDIOVASCULAR:    [x ]Regular [ ]Irregular [ ]Tachy  [ ]James [ ]Murmur [ ]Other  GASTROINTESTINAL:  [ x]Soft  [ ]Distended   [ ]+BS  [ ]Non tender [ ]Tender  [ ]Other [ ]PEG [x ]OGT/ NGT  Last BM:  GENITOURINARY:  [ ]Normal [x ] Incontinent   [ ]Oliguria/Anuria   [ ]Spangler  MUSCULOSKELETAL:   [ ]Normal   [ ]Weakness  [x ]Bed/Wheelchair bound [ ]Edema  NEUROLOGIC:   [ ]No focal deficits  x[ ]Cognitive impairment  [x ]Dysphagia [ x]Dysarthria [ ]Paresis [ ]Other   SKIN:   [ ]Normal  [ ]Rash  [ ]Other  [x ]Pressure ulcer(s)- multiple pressure ulcers       Present on admission [x ]y [ ]n    CRITICAL CARE:  [ ] Shock Present  [ ]Septic [ ]Cardiogenic [ ]Neurologic [ ]Hypovolemic  [ ]  Vasopressors [ ]  Inotropes   [ ]Respiratory failure present [ ]Mechanical ventilation [ ]Non-invasive ventilatory support [ ]High flow    [ ]Acute  [ ]Chronic [ ]Hypoxic  [ ]Hypercarbic [ ]Other  [ ]Other organ failure     LABS:                        12.4   11.23 )-----------( 160      ( 26 Dec 2023 07:27 )             39.1   12-26    150<H>  |  113<H>  |  19  ----------------------------<  154<H>  4.0   |  26  |  0.52    Ca    8.3<L>      26 Dec 2023 07:25  Phos  3.0     12-26  Mg     2.4     12-26    TPro  5.9<L>  /  Alb  2.8<L>  /  TBili  0.6  /  DBili  x   /  AST  13  /  ALT  18  /  AlkPhos  81  12-25      Urinalysis Basic - ( 26 Dec 2023 07:25 )    Color: x / Appearance: x / SG: x / pH: x  Gluc: 154 mg/dL / Ketone: x  / Bili: x / Urobili: x   Blood: x / Protein: x / Nitrite: x   Leuk Esterase: x / RBC: x / WBC x   Sq Epi: x / Non Sq Epi: x / Bacteria: x      RADIOLOGY & ADDITIONAL STUDIES:  < from: CT Head No Cont (12.20.23 @ 12:24) >    ACC: [id number] EXAM:  CT BRAIN   ORDERED BY: SHARI EISENBERG     PROCEDURE DATE:  12/20/2023          INTERPRETATION:  Clinical indication: Altered mental status    Multiple axial sections performed from the base of skull to vertex   without contrast enhancement. Coronal and sagittal reconstructions were   performed    This exam is compared prior head CT performed on September 19, 2023    Parenchymal volume loss and chronic microvascular changes are again seen.    There is no acute hemorrhage mass or mass effect seen.    Evaluation of the osseous structures with appropriate window appears   normal.    IMPRESSION: No acute hemorrhage mass or mass effect is seen.    --- End of Report ---        YANNI MCNEAL MD; Attending Radiologist  This document has been electronically signed. Dec 20 2023 12:26PM    < end of copied text >      PROTEIN CALORIE MALNUTRITION PRESENT: [ ]mild [ ]moderate [ ]severe [ ]underweight [ ]morbid obesity  https://www.andeal.org/vault/2440/web/files/ONC/Table_Clinical%20Characteristics%20to%20Document%20Malnutrition-White%20JV%20et%20al%202012.pdf    Height (cm): 160 (12-20-23 @ 10:46), 167.6 (09-18-23 @ 20:11), 165.1 (03-17-23 @ 08:17)  Weight (kg): 40.8 (12-20-23 @ 10:46), 49.9 (09-18-23 @ 20:11), 54.4 (03-17-23 @ 08:17)  BMI (kg/m2): 15.9 (12-20-23 @ 10:46), 17.8 (09-18-23 @ 20:11), 20 (03-17-23 @ 08:17)    [x ]PPSV2 < or = to 30% [ ]significant weight loss  [x ]poor nutritional intake  [ ]anasarca[x ]Artificial Nutrition      Other REFERRALS:  [ ]Hospice  [ ]Child Life  [x ]Social Work  [ ]Case management [ ]Holistic Therapy     Goals of Care Document:

## 2023-12-26 NOTE — CONSULT NOTE ADULT - PROBLEM SELECTOR RECOMMENDATION 5
initial outreach made to spouse Silverio today, introduced self and role  Silverio requested a f/u phone call for tomorrow 12/27 at 10am to discuss options further

## 2023-12-26 NOTE — PROVIDER CONTACT NOTE (OTHER) - ASSESSMENT
Pt's NG tube is clogged, error message keeps alerting. Administered sterile water through piston syringe, but it is resisting. Pt's NG tube is clogged, error message keeps alerting. Administered sterile water through piston syringe, but it is resisting. FS of 174 at 23:56.

## 2023-12-26 NOTE — SWALLOW BEDSIDE ASSESSMENT ADULT - SLP PERTINENT HISTORY OF CURRENT PROBLEM
68y F w/ PMHx of TBI s/p fall (residual aphasia, L-sided weakness, and dysphagia at baseline), CVA, meningioma, breast CA, and hypothyroidism presents w/ AMS and poor PO intake. Found to have significant hypernatremia to 166 (checking q6h), mild hypotension (90/60) positive UA with borderline leukocytosis with PMN predominance. Pending culture results. Physical exam notable for encephalopathy AOx0 and non-verbal, cachexia and b/l upper/lower ext contracture, and multiple pressure ulcers of varying stages (I-III). Palliative Care consult pending.

## 2023-12-26 NOTE — PROGRESS NOTE ADULT - ATTENDING COMMENTS
Metabolic encephalopathy in setting of hypernatremia with UTI- requiring NGT for fluid and nutrition   Hypernatremia- free water via NGT  UTI- s/p 3 days ceftriaxone completed 12/24  Severe malnutrition-  requesting PEG evaluation- continue tube feeds via NGT  Multiple pressure ulcers- wound care following   Hypothyroidism- Continue levothyroxine IV 56 mcg QD)  Palliative care consult pending

## 2023-12-26 NOTE — CONSULT NOTE ADULT - ASSESSMENT
68F with PMH significant for breast cancer s/p L mastectomy, TBI (s/p a fall ~2 years ago) c/b aphasia, meningioma, hypothyroidism (hx Grave's dz s/p radioactive iodine), CVA, and dysphagia who presents to [facility] ED on 12/20/2023 with no PO intake and increased lethargy in the last 2-3 days. Palliative consulted for nutritional goals of care given frequent hospitalizations for hypernatremia 68F with PMH significant for breast cancer s/p L mastectomy, TBI (s/p a fall ~2 years ago) c/b aphasia, meningioma, hypothyroidism (hx Grave's dz s/p radioactive iodine), CVA, and dysphagia who presents to [facility] ED on 12/20/2023 with no PO intake and increased lethargy in the last 2-3 days. Palliative consulted for nutritional goals of care given frequent hospitalizations for hypernatremia 68F with PMH significant for breast cancer s/p L mastectomy, TBI (s/p a fall ~2 years ago) c/b aphasia, meningioma, hypothyroidism (hx Grave's dz s/p radioactive iodine), CVA, and dysphagia who presents to [facility] ED on 12/20/2023 with no PO intake and increased lethargy in the last 2-3 days. Palliative consulted for nutritional goals of care given frequent hospitalizations for hypernatremia.  68F with PMH significant for breast cancer s/p L mastectomy, TBI (s/p a fall ~2 years ago) c/b aphasia, meningioma, hypothyroidism (hx Grave's dz s/p radioactive iodine), CVA, and dysphagia who presents to [facility] ED on 12/20/2023 with no PO intake and increased lethargy in the last 2-3 days. Palliative consulted for nutritional goals of care given frequent hospitalizations for hypernatremia.

## 2023-12-26 NOTE — PROGRESS NOTE ADULT - PROBLEM SELECTOR PLAN 8
- DVT ppx: Lovenox (40 mg QD; CrCl >30 ml/min) and SCDs  - Diet: NPO until SLP eval  - PT  - Dispo: pending clinical course - DVT ppx: Lovenox (40 mg QD; CrCl >30 ml/min) and SCDs  - Diet: NPO until SLP eval  - Dispo: pending clinical course

## 2023-12-26 NOTE — PROGRESS NOTE ADULT - SUBJECTIVE AND OBJECTIVE BOX
*******************************  Usha Reyes MD (PGY-1)  Internal Medicine  Contact via [employer] TEAMS  *******************************    YOUNGER, JONATHAN  68y  Female    Patient is a 68y old  Female who presents with a chief complaint of No PO intake, lethargy (25 Dec 2023 07:03)      Subjective:    Objective:  T(C): 36.4 (12-26-23 @ 05:00), Max: 36.9 (12-25-23 @ 20:55)  HR: 98 (12-26-23 @ 05:00) (82 - 98)  BP: 92/63 (12-26-23 @ 05:00) (92/63 - 101/68)  RR: 18 (12-26-23 @ 05:00) (18 - 18)  SpO2: 95% (12-26-23 @ 05:00) (94% - 95%)  I&O's Summary      PHYSICAL EXAM:  GENERAL: NAD, well-groomed, well-developed  HEAD:  Atraumatic, Normocephalic  EYES: EOMI, PERRLA, conjunctiva and sclera clear  ENMT: No tonsillar erythema, exudates, or enlargement; Moist mucous membranes, Good dentition, No lesions  NECK: Supple, No JVD, Normal thyroid  NERVOUS SYSTEM:  Alert & Oriented X3, Good concentration; Motor Strength 5/5 B/L upper and lower extremities; DTRs 2+ intact and symmetric  CHEST/LUNG: Clear to auscultation bilaterally; No rales, rhonchi, wheezing, or rubs  HEART: Regular rate and rhythm; No murmurs, rubs, or gallops  ABDOMEN: Soft, Nontender, Nondistended; Bowel sounds present  EXTREMITIES:  2+ Peripheral Pulses, No clubbing, cyanosis, or edema  LYMPH: No lymphadenopathy noted  SKIN: No rashes or lesions    LABS:      CAPILLARY BLOOD GLUCOSE      POCT Blood Glucose.: 161 mg/dL (26 Dec 2023 05:05)  POCT Blood Glucose.: 174 mg/dL (25 Dec 2023 23:56)  POCT Blood Glucose.: 128 mg/dL (25 Dec 2023 18:00)  POCT Blood Glucose.: 187 mg/dL (25 Dec 2023 11:58)      RADIOLOGY & ADDITIONAL TESTS:    MEDICATIONS  (STANDING):  ascorbic acid 500 milliGRAM(s) Oral daily  collagenase Ointment 1 Application(s) Topical daily  enoxaparin Injectable 40 milliGRAM(s) SubCutaneous every 24 hours  folic acid 1 milliGRAM(s) Oral daily  levothyroxine 112 MICROGram(s) Oral daily  multivitamin 1 Tablet(s) Oral daily  polyethylene glycol 3350 17 Gram(s) Oral daily  senna 2 Tablet(s) Oral at bedtime  sertraline 25 milliGRAM(s) Oral daily  thiamine 100 milliGRAM(s) Oral daily    MEDICATIONS  (PRN):             *******************************  Usha Reyes MD (PGY-1)  Internal Medicine  Contact via [employer] TEAMS  *******************************    YOUNGER, JONATHAN  68y  Female    Patient is a 68y old  Female who presents with a chief complaint of No PO intake, lethargy (25 Dec 2023 07:03)    Subjective: Overnight, NG tube dislodged, was replaced. Seen at bedside this morning. Patient opened eyes to voice, unable to participate in history taking.     Objective:  T(C): 36.4 (12-26-23 @ 05:00), Max: 36.9 (12-25-23 @ 20:55)  HR: 98 (12-26-23 @ 05:00) (82 - 98)  BP: 92/63 (12-26-23 @ 05:00) (92/63 - 101/68)  RR: 18 (12-26-23 @ 05:00) (18 - 18)  SpO2: 95% (12-26-23 @ 05:00) (94% - 95%)  I&O's Summary    PHYSICAL EXAM:  GENERAL: NAD, well-groomed, well-developed  HEAD:  Atraumatic, Normocephalic  EYES: EOMI, PERRLA, conjunctiva and sclera clear  ENMT: No tonsillar erythema, exudates, or enlargement; Moist mucous membranes, Good dentition, No lesions  NECK: Supple, No JVD, Normal thyroid  NERVOUS SYSTEM:  Alert & Oriented X3, Good concentration; Motor Strength 5/5 B/L upper and lower extremities; DTRs 2+ intact and symmetric  CHEST/LUNG: Clear to auscultation bilaterally; No rales, rhonchi, wheezing, or rubs  HEART: Regular rate and rhythm; No murmurs, rubs, or gallops  ABDOMEN: Soft, Nontender, Nondistended; Bowel sounds present  EXTREMITIES:  2+ Peripheral Pulses, No clubbing, cyanosis, or edema  LYMPH: No lymphadenopathy noted  SKIN: No rashes or lesions    LABS:               12.4   11.23 )-----------( 160      ( 26 Dec 2023 07:27 )             39.1     12-26    150<H>  |  113<H>  |  19  ----------------------------<  154<H>  4.0   |  26  |  0.52    Ca    8.3<L>      26 Dec 2023 07:25  Phos  3.0     12-26  Mg     2.4     12-26    TPro  5.9<L>  /  Alb  2.8<L>  /  TBili  0.6  /  DBili  x   /  AST  13  /  ALT  18  /  AlkPhos  81  12-25    CAPILLARY BLOOD GLUCOSE  POCT Blood Glucose.: 174 mg/dL (26 Dec 2023 12:08)    Culture Results:   >100,000 CFU/ml Escherichia coli (12-20 @ 11:54)  Culture Results:   No growth at 5 days (12-20 @ 11:30)  Culture Results:   No growth at 5 days (12-20 @ 11:15)    CAPILLARY BLOOD GLUCOSE  POCT Blood Glucose.: 161 mg/dL (26 Dec 2023 05:05)  POCT Blood Glucose.: 174 mg/dL (25 Dec 2023 23:56)  POCT Blood Glucose.: 128 mg/dL (25 Dec 2023 18:00)  POCT Blood Glucose.: 187 mg/dL (25 Dec 2023 11:58)    RADIOLOGY & ADDITIONAL TESTS:  < from: Xray Chest 1 View- PORTABLE-Urgent (Xray Chest 1 View- PORTABLE-Urgent .) (12.26.23 @ 05:14) >    FINDINGS:  Enteric tube coiling in the stomach.  Surgical clips in the left lateral chest.  The lungs are clear.  There is no pleural effusion or definite pneumothorax.  The heart is normal in size  The visualized osseous structures demonstrate no acute pathology.   Osteopenia. Mild levocurvature of the spine.    IMPRESSION:  Enteric tube with tip in the stomach.  < end of copied text >    MEDICATIONS  (STANDING):  ascorbic acid 500 milliGRAM(s) Oral daily  collagenase Ointment 1 Application(s) Topical daily  enoxaparin Injectable 40 milliGRAM(s) SubCutaneous every 24 hours  folic acid 1 milliGRAM(s) Oral daily  levothyroxine 112 MICROGram(s) Oral daily  multivitamin 1 Tablet(s) Oral daily  polyethylene glycol 3350 17 Gram(s) Oral daily  senna 2 Tablet(s) Oral at bedtime  sertraline 25 milliGRAM(s) Oral daily  thiamine 100 milliGRAM(s) Oral daily    MEDICATIONS  (PRN):             *******************************  Usha Reyes MD (PGY-1)  Internal Medicine  Contact via [employer] TEAMS  *******************************     YOUNGER, JONATHAN  68y  Female    Patient is a 68y old  Female who presents with a chief complaint of No PO intake, lethargy (25 Dec 2023 07:03)    Subjective: Overnight, NG tube dislodged, was replaced. Seen at bedside this morning. Patient opened eyes to voice, unable to participate in history taking.     Objective:  T(C): 36.4 (12-26-23 @ 05:00), Max: 36.9 (12-25-23 @ 20:55)  HR: 98 (12-26-23 @ 05:00) (82 - 98)  BP: 92/63 (12-26-23 @ 05:00) (92/63 - 101/68)  RR: 18 (12-26-23 @ 05:00) (18 - 18)  SpO2: 95% (12-26-23 @ 05:00) (94% - 95%)  I&O's Summary    PHYSICAL EXAM:  GENERAL: NAD, well-groomed, well-developed  HEAD:  Atraumatic, Normocephalic  EYES: EOMI, PERRLA, conjunctiva and sclera clear  ENMT: No tonsillar erythema, exudates, or enlargement; Moist mucous membranes, Good dentition, No lesions  NECK: Supple, No JVD, Normal thyroid  NERVOUS SYSTEM:  Alert & Oriented X3, Good concentration; Motor Strength 5/5 B/L upper and lower extremities; DTRs 2+ intact and symmetric  CHEST/LUNG: Clear to auscultation bilaterally; No rales, rhonchi, wheezing, or rubs  HEART: Regular rate and rhythm; No murmurs, rubs, or gallops  ABDOMEN: Soft, Nontender, Nondistended; Bowel sounds present  EXTREMITIES:  2+ Peripheral Pulses, No clubbing, cyanosis, or edema  LYMPH: No lymphadenopathy noted  SKIN: No rashes or lesions    LABS:               12.4   11.23 )-----------( 160      ( 26 Dec 2023 07:27 )             39.1     12-26    150<H>  |  113<H>  |  19  ----------------------------<  154<H>  4.0   |  26  |  0.52    Ca    8.3<L>      26 Dec 2023 07:25  Phos  3.0     12-26  Mg     2.4     12-26    TPro  5.9<L>  /  Alb  2.8<L>  /  TBili  0.6  /  DBili  x   /  AST  13  /  ALT  18  /  AlkPhos  81  12-25    CAPILLARY BLOOD GLUCOSE  POCT Blood Glucose.: 174 mg/dL (26 Dec 2023 12:08)    Culture Results:   >100,000 CFU/ml Escherichia coli (12-20 @ 11:54)  Culture Results:   No growth at 5 days (12-20 @ 11:30)  Culture Results:   No growth at 5 days (12-20 @ 11:15)    CAPILLARY BLOOD GLUCOSE  POCT Blood Glucose.: 161 mg/dL (26 Dec 2023 05:05)  POCT Blood Glucose.: 174 mg/dL (25 Dec 2023 23:56)  POCT Blood Glucose.: 128 mg/dL (25 Dec 2023 18:00)  POCT Blood Glucose.: 187 mg/dL (25 Dec 2023 11:58)    RADIOLOGY & ADDITIONAL TESTS:  < from: Xray Chest 1 View- PORTABLE-Urgent (Xray Chest 1 View- PORTABLE-Urgent .) (12.26.23 @ 05:14) >    FINDINGS:  Enteric tube coiling in the stomach.  Surgical clips in the left lateral chest.  The lungs are clear.  There is no pleural effusion or definite pneumothorax.  The heart is normal in size  The visualized osseous structures demonstrate no acute pathology.   Osteopenia. Mild levocurvature of the spine.    IMPRESSION:  Enteric tube with tip in the stomach.  < end of copied text >    MEDICATIONS  (STANDING):  ascorbic acid 500 milliGRAM(s) Oral daily  collagenase Ointment 1 Application(s) Topical daily  enoxaparin Injectable 40 milliGRAM(s) SubCutaneous every 24 hours  folic acid 1 milliGRAM(s) Oral daily  levothyroxine 112 MICROGram(s) Oral daily  multivitamin 1 Tablet(s) Oral daily  polyethylene glycol 3350 17 Gram(s) Oral daily  senna 2 Tablet(s) Oral at bedtime  sertraline 25 milliGRAM(s) Oral daily  thiamine 100 milliGRAM(s) Oral daily    MEDICATIONS  (PRN):

## 2023-12-27 LAB
ANION GAP SERPL CALC-SCNC: 10 MMOL/L — SIGNIFICANT CHANGE UP (ref 5–17)
ANION GAP SERPL CALC-SCNC: 10 MMOL/L — SIGNIFICANT CHANGE UP (ref 5–17)
BASOPHILS # BLD AUTO: 0.01 K/UL — SIGNIFICANT CHANGE UP (ref 0–0.2)
BASOPHILS # BLD AUTO: 0.01 K/UL — SIGNIFICANT CHANGE UP (ref 0–0.2)
BASOPHILS NFR BLD AUTO: 0.1 % — SIGNIFICANT CHANGE UP (ref 0–2)
BASOPHILS NFR BLD AUTO: 0.1 % — SIGNIFICANT CHANGE UP (ref 0–2)
BUN SERPL-MCNC: 18 MG/DL — SIGNIFICANT CHANGE UP (ref 7–23)
BUN SERPL-MCNC: 18 MG/DL — SIGNIFICANT CHANGE UP (ref 7–23)
CALCIUM SERPL-MCNC: 7.9 MG/DL — LOW (ref 8.4–10.5)
CALCIUM SERPL-MCNC: 7.9 MG/DL — LOW (ref 8.4–10.5)
CHLORIDE SERPL-SCNC: 112 MMOL/L — HIGH (ref 96–108)
CHLORIDE SERPL-SCNC: 112 MMOL/L — HIGH (ref 96–108)
CO2 SERPL-SCNC: 27 MMOL/L — SIGNIFICANT CHANGE UP (ref 22–31)
CO2 SERPL-SCNC: 27 MMOL/L — SIGNIFICANT CHANGE UP (ref 22–31)
CREAT SERPL-MCNC: 0.42 MG/DL — LOW (ref 0.5–1.3)
CREAT SERPL-MCNC: 0.42 MG/DL — LOW (ref 0.5–1.3)
EGFR: 105 ML/MIN/1.73M2 — SIGNIFICANT CHANGE UP
EGFR: 105 ML/MIN/1.73M2 — SIGNIFICANT CHANGE UP
EOSINOPHIL # BLD AUTO: 0.02 K/UL — SIGNIFICANT CHANGE UP (ref 0–0.5)
EOSINOPHIL # BLD AUTO: 0.02 K/UL — SIGNIFICANT CHANGE UP (ref 0–0.5)
EOSINOPHIL NFR BLD AUTO: 0.2 % — SIGNIFICANT CHANGE UP (ref 0–6)
EOSINOPHIL NFR BLD AUTO: 0.2 % — SIGNIFICANT CHANGE UP (ref 0–6)
GLUCOSE BLDC GLUCOMTR-MCNC: 137 MG/DL — HIGH (ref 70–99)
GLUCOSE BLDC GLUCOMTR-MCNC: 137 MG/DL — HIGH (ref 70–99)
GLUCOSE BLDC GLUCOMTR-MCNC: 148 MG/DL — HIGH (ref 70–99)
GLUCOSE BLDC GLUCOMTR-MCNC: 148 MG/DL — HIGH (ref 70–99)
GLUCOSE BLDC GLUCOMTR-MCNC: 163 MG/DL — HIGH (ref 70–99)
GLUCOSE BLDC GLUCOMTR-MCNC: 163 MG/DL — HIGH (ref 70–99)
GLUCOSE SERPL-MCNC: 128 MG/DL — HIGH (ref 70–99)
GLUCOSE SERPL-MCNC: 128 MG/DL — HIGH (ref 70–99)
HCT VFR BLD CALC: 34.9 % — SIGNIFICANT CHANGE UP (ref 34.5–45)
HCT VFR BLD CALC: 34.9 % — SIGNIFICANT CHANGE UP (ref 34.5–45)
HGB BLD-MCNC: 10.6 G/DL — LOW (ref 11.5–15.5)
HGB BLD-MCNC: 10.6 G/DL — LOW (ref 11.5–15.5)
IMM GRANULOCYTES NFR BLD AUTO: 0.4 % — SIGNIFICANT CHANGE UP (ref 0–0.9)
IMM GRANULOCYTES NFR BLD AUTO: 0.4 % — SIGNIFICANT CHANGE UP (ref 0–0.9)
LYMPHOCYTES # BLD AUTO: 1.01 K/UL — SIGNIFICANT CHANGE UP (ref 1–3.3)
LYMPHOCYTES # BLD AUTO: 1.01 K/UL — SIGNIFICANT CHANGE UP (ref 1–3.3)
LYMPHOCYTES # BLD AUTO: 10.9 % — LOW (ref 13–44)
LYMPHOCYTES # BLD AUTO: 10.9 % — LOW (ref 13–44)
MAGNESIUM SERPL-MCNC: 2.5 MG/DL — SIGNIFICANT CHANGE UP (ref 1.6–2.6)
MAGNESIUM SERPL-MCNC: 2.5 MG/DL — SIGNIFICANT CHANGE UP (ref 1.6–2.6)
MCHC RBC-ENTMCNC: 27.2 PG — SIGNIFICANT CHANGE UP (ref 27–34)
MCHC RBC-ENTMCNC: 27.2 PG — SIGNIFICANT CHANGE UP (ref 27–34)
MCHC RBC-ENTMCNC: 30.4 GM/DL — LOW (ref 32–36)
MCHC RBC-ENTMCNC: 30.4 GM/DL — LOW (ref 32–36)
MCV RBC AUTO: 89.5 FL — SIGNIFICANT CHANGE UP (ref 80–100)
MCV RBC AUTO: 89.5 FL — SIGNIFICANT CHANGE UP (ref 80–100)
MONOCYTES # BLD AUTO: 0.54 K/UL — SIGNIFICANT CHANGE UP (ref 0–0.9)
MONOCYTES # BLD AUTO: 0.54 K/UL — SIGNIFICANT CHANGE UP (ref 0–0.9)
MONOCYTES NFR BLD AUTO: 5.8 % — SIGNIFICANT CHANGE UP (ref 2–14)
MONOCYTES NFR BLD AUTO: 5.8 % — SIGNIFICANT CHANGE UP (ref 2–14)
NEUTROPHILS # BLD AUTO: 7.68 K/UL — HIGH (ref 1.8–7.4)
NEUTROPHILS # BLD AUTO: 7.68 K/UL — HIGH (ref 1.8–7.4)
NEUTROPHILS NFR BLD AUTO: 82.6 % — HIGH (ref 43–77)
NEUTROPHILS NFR BLD AUTO: 82.6 % — HIGH (ref 43–77)
NRBC # BLD: 0 /100 WBCS — SIGNIFICANT CHANGE UP (ref 0–0)
NRBC # BLD: 0 /100 WBCS — SIGNIFICANT CHANGE UP (ref 0–0)
PHOSPHATE SERPL-MCNC: 2.5 MG/DL — SIGNIFICANT CHANGE UP (ref 2.5–4.5)
PHOSPHATE SERPL-MCNC: 2.5 MG/DL — SIGNIFICANT CHANGE UP (ref 2.5–4.5)
PLATELET # BLD AUTO: 150 K/UL — SIGNIFICANT CHANGE UP (ref 150–400)
PLATELET # BLD AUTO: 150 K/UL — SIGNIFICANT CHANGE UP (ref 150–400)
POTASSIUM SERPL-MCNC: 4.1 MMOL/L — SIGNIFICANT CHANGE UP (ref 3.5–5.3)
POTASSIUM SERPL-MCNC: 4.1 MMOL/L — SIGNIFICANT CHANGE UP (ref 3.5–5.3)
POTASSIUM SERPL-SCNC: 4.1 MMOL/L — SIGNIFICANT CHANGE UP (ref 3.5–5.3)
POTASSIUM SERPL-SCNC: 4.1 MMOL/L — SIGNIFICANT CHANGE UP (ref 3.5–5.3)
RBC # BLD: 3.9 M/UL — SIGNIFICANT CHANGE UP (ref 3.8–5.2)
RBC # BLD: 3.9 M/UL — SIGNIFICANT CHANGE UP (ref 3.8–5.2)
RBC # FLD: 16.2 % — HIGH (ref 10.3–14.5)
RBC # FLD: 16.2 % — HIGH (ref 10.3–14.5)
SODIUM SERPL-SCNC: 149 MMOL/L — HIGH (ref 135–145)
SODIUM SERPL-SCNC: 149 MMOL/L — HIGH (ref 135–145)
WBC # BLD: 9.3 K/UL — SIGNIFICANT CHANGE UP (ref 3.8–10.5)
WBC # BLD: 9.3 K/UL — SIGNIFICANT CHANGE UP (ref 3.8–10.5)
WBC # FLD AUTO: 9.3 K/UL — SIGNIFICANT CHANGE UP (ref 3.8–10.5)
WBC # FLD AUTO: 9.3 K/UL — SIGNIFICANT CHANGE UP (ref 3.8–10.5)

## 2023-12-27 PROCEDURE — 99232 SBSQ HOSP IP/OBS MODERATE 35: CPT | Mod: GC

## 2023-12-27 PROCEDURE — 71045 X-RAY EXAM CHEST 1 VIEW: CPT | Mod: 26

## 2023-12-27 PROCEDURE — 99497 ADVNCD CARE PLAN 30 MIN: CPT | Mod: 25

## 2023-12-27 RX ORDER — ACETAMINOPHEN 500 MG
650 TABLET ORAL ONCE
Refills: 0 | Status: COMPLETED | OUTPATIENT
Start: 2023-12-27 | End: 2023-12-27

## 2023-12-27 RX ADMIN — Medication 100 MILLIGRAM(S): at 11:37

## 2023-12-27 RX ADMIN — Medication 1 TABLET(S): at 11:37

## 2023-12-27 RX ADMIN — Medication 1 APPLICATION(S): at 11:52

## 2023-12-27 RX ADMIN — Medication 112 MICROGRAM(S): at 04:23

## 2023-12-27 RX ADMIN — Medication 1 MILLIGRAM(S): at 11:37

## 2023-12-27 RX ADMIN — Medication 500 MILLIGRAM(S): at 11:37

## 2023-12-27 RX ADMIN — SERTRALINE 25 MILLIGRAM(S): 25 TABLET, FILM COATED ORAL at 11:37

## 2023-12-27 RX ADMIN — POLYETHYLENE GLYCOL 3350 17 GRAM(S): 17 POWDER, FOR SOLUTION ORAL at 11:38

## 2023-12-27 RX ADMIN — ENOXAPARIN SODIUM 40 MILLIGRAM(S): 100 INJECTION SUBCUTANEOUS at 04:23

## 2023-12-27 RX ADMIN — Medication 650 MILLIGRAM(S): at 22:25

## 2023-12-27 NOTE — PROGRESS NOTE ADULT - WHAT MATTERS MOST
treating reversible causes  f/u with speech, PT and wants ENT consultation for dysphagia work up  interested in PEG if needed

## 2023-12-27 NOTE — PROGRESS NOTE ADULT - PROBLEM SELECTOR PLAN 8
- DVT ppx: Lovenox (40 mg QD; CrCl >30 ml/min) and SCDs  - Diet: NPO until SLP eval  - Dispo: pending clinical course

## 2023-12-27 NOTE — PROGRESS NOTE ADULT - SUBJECTIVE AND OBJECTIVE BOX
*******************************  Usha Reyes MD (PGY-1)  Internal Medicine  Contact via [employer] TEAMS  *******************************    YOUNGER, JONATHAN  68y  Female    Patient is a 68y old  Female who presents with a chief complaint of No PO intake, lethargy (26 Dec 2023 14:11)      Subjective:    Objective:  T(C): 36.5 (12-27-23 @ 04:30), Max: 37.2 (12-26-23 @ 16:38)  HR: 88 (12-27-23 @ 04:30) (88 - 98)  BP: 103/66 (12-27-23 @ 04:30) (91/57 - 103/66)  RR: 18 (12-27-23 @ 04:30) (16 - 18)  SpO2: 94% (12-27-23 @ 04:30) (94% - 96%)  I&O's Summary      PHYSICAL EXAM:  GENERAL: NAD, well-groomed, well-developed  HEAD:  Atraumatic, Normocephalic  EYES: EOMI, PERRLA, conjunctiva and sclera clear  ENMT: No tonsillar erythema, exudates, or enlargement; Moist mucous membranes, Good dentition, No lesions  NECK: Supple, No JVD, Normal thyroid  NERVOUS SYSTEM:  Alert & Oriented X3, Good concentration; Motor Strength 5/5 B/L upper and lower extremities; DTRs 2+ intact and symmetric  CHEST/LUNG: Clear to auscultation bilaterally; No rales, rhonchi, wheezing, or rubs  HEART: Regular rate and rhythm; No murmurs, rubs, or gallops  ABDOMEN: Soft, Nontender, Nondistended; Bowel sounds present  EXTREMITIES:  2+ Peripheral Pulses, No clubbing, cyanosis, or edema  LYMPH: No lymphadenopathy noted  SKIN: No rashes or lesions    LABS:      CAPILLARY BLOOD GLUCOSE      POCT Blood Glucose.: 137 mg/dL (27 Dec 2023 06:05)  POCT Blood Glucose.: 145 mg/dL (26 Dec 2023 23:51)  POCT Blood Glucose.: 171 mg/dL (26 Dec 2023 17:36)  POCT Blood Glucose.: 174 mg/dL (26 Dec 2023 12:08)      RADIOLOGY & ADDITIONAL TESTS:    MEDICATIONS  (STANDING):  ascorbic acid 500 milliGRAM(s) Oral daily  collagenase Ointment 1 Application(s) Topical daily  enoxaparin Injectable 40 milliGRAM(s) SubCutaneous every 24 hours  folic acid 1 milliGRAM(s) Oral daily  levothyroxine 112 MICROGram(s) Oral daily  multivitamin 1 Tablet(s) Oral daily  polyethylene glycol 3350 17 Gram(s) Oral daily  senna 2 Tablet(s) Oral at bedtime  sertraline 25 milliGRAM(s) Oral daily  thiamine 100 milliGRAM(s) Oral daily    MEDICATIONS  (PRN):             *******************************  Usha Reyes MD (PGY-1)  Internal Medicine  Contact via [employer] TEAMS  *******************************    YOUNGER, JONATHAN  68y  Female    Patient is a 68y old  Female who presents with a chief complaint of No PO intake, lethargy (26 Dec 2023 14:11)    Subjective: NG tube clogged. Seen at bedside this morning. Unable to participate in ROS.     Objective:  T(C): 36.5 (12-27-23 @ 04:30), Max: 37.2 (12-26-23 @ 16:38)  HR: 88 (12-27-23 @ 04:30) (88 - 98)  BP: 103/66 (12-27-23 @ 04:30) (91/57 - 103/66)  RR: 18 (12-27-23 @ 04:30) (16 - 18)  SpO2: 94% (12-27-23 @ 04:30) (94% - 96%)  I&O's Summary    PHYSICAL EXAM:  GENERAL: NAD, cachetic, chronically ill appearing   HEAD:  Atraumatic, Normocephalic  EYES: EOMI, PERRLA, conjunctiva and sclera clear  ENMT: dry mucous membranes  NECK: Supple, trachea midline   NERVOUS SYSTEM:  Alert & Oriented X0, opens eyes to voice,   CHEST/LUNG: Clear to auscultation bilaterally; No rales, rhonchi, wheezing, or rubs  HEART: Regular rate and rhythm; No murmurs, rubs, or gallops  ABDOMEN: Soft, Nontender, Nondistended; Bowel sounds present  EXTREMITIES:  2+ Peripheral Pulses, contracted   LYMPH: No lymphadenopathy noted  SKIN: No rashes or lesions    LABS:                  10.6   9.30  )-----------( 150      ( 27 Dec 2023 07:04 )             34.9     12-27    149<H>  |  112<H>  |  18  ----------------------------<  128<H>  4.1   |  27  |  0.42<L>    Ca    7.9<L>      27 Dec 2023 07:06  Phos  2.5     12-27  Mg     2.5     12-27    Culture Results:   >100,000 CFU/ml Escherichia coli (12-20 @ 11:54)  Culture Results:   No growth at 5 days (12-20 @ 11:30)  Culture Results:   No growth at 5 days (12-20 @ 11:15)    CAPILLARY BLOOD GLUCOSE  POCT Blood Glucose.: 137 mg/dL (27 Dec 2023 06:05)  POCT Blood Glucose.: 145 mg/dL (26 Dec 2023 23:51)  POCT Blood Glucose.: 171 mg/dL (26 Dec 2023 17:36)  POCT Blood Glucose.: 174 mg/dL (26 Dec 2023 12:08)    RADIOLOGY & ADDITIONAL TESTS:    MEDICATIONS  (STANDING):  ascorbic acid 500 milliGRAM(s) Oral daily  collagenase Ointment 1 Application(s) Topical daily  enoxaparin Injectable 40 milliGRAM(s) SubCutaneous every 24 hours  folic acid 1 milliGRAM(s) Oral daily  levothyroxine 112 MICROGram(s) Oral daily  multivitamin 1 Tablet(s) Oral daily  polyethylene glycol 3350 17 Gram(s) Oral daily  senna 2 Tablet(s) Oral at bedtime  sertraline 25 milliGRAM(s) Oral daily  thiamine 100 milliGRAM(s) Oral daily    MEDICATIONS  (PRN):

## 2023-12-27 NOTE — PROGRESS NOTE ADULT - ASSESSMENT
68F with PMH significant for breast cancer s/p L mastectomy, TBI (s/p a fall ~2 years ago) c/b aphasia, meningioma, hypothyroidism (hx Grave's dz s/p radioactive iodine), CVA, and dysphagia who presents to [facility] ED on 12/20/2023 with no PO intake and increased lethargy in the last 2-3 days, admitted for hypernatremia 166 and UTI.    68F with PMH significant for breast cancer s/p L mastectomy, TBI (s/p a fall ~2 years ago) c/b aphasia, meningioma, hypothyroidism (hx Grave's dz s/p radioactive iodine), CVA, and dysphagia who presents to [facility] ED on 12/20/2023 with no PO intake and increased lethargy in the last 2-3 days, admitted for hypernatremia 166 and UTI.

## 2023-12-27 NOTE — CHART NOTE - NSCHARTNOTEFT_GEN_A_CORE
Nutrition Follow Up Note  Patient seen for: nutrition consult for calorie count     Chart reviewed, events noted. Chart reviewed, events noted.     Source: [] Patient       [x] EMR        [] RN        [] Family at bedside       [] Other:    -If unable to interview patient: [] Trach/Vent/BiPAP  [] Disoriented/confused/inappropriate to interview    Diet Order:   Diet, Pureed:   Moderately Thick Liquids (MODTHICKLIQS)  Liquid via Teaspoon Only (12-27-23)    - Is current order appropriate/adequate? [] Yes  []  No:     - PO intake :   [] >75%  Adequate    [] 50-75%  Fair       [] <50%  Poor    - Nutrition-related concerns:    GI:  Last BM ___.   Bowel Regimen? [] Yes   [] No      Weights:   Daily     Nutritionally Pertinent MEDICATIONS  (STANDING):  ascorbic acid  folic acid  levothyroxine  multivitamin  polyethylene glycol 3350  senna  thiamine    Pertinent Labs: 12-27 @ 07:06: Na 149<H>, BUN 18, Cr 0.42<L>, <H>, K+ 4.1, Phos 2.5, Mg 2.5, Alk Phos --, ALT/SGPT --, AST/SGOT --, HbA1c --    A1C with Estimated Average Glucose Result: 5.7 % (09-19-23 @ 10:33)    Finger Sticks:  POCT Blood Glucose.: 137 mg/dL (12-27 @ 06:05)  POCT Blood Glucose.: 145 mg/dL (12-26 @ 23:51)  POCT Blood Glucose.: 171 mg/dL (12-26 @ 17:36)  POCT Blood Glucose.: 174 mg/dL (12-26 @ 12:08)      Skin per nursing documentation:   Edema:     Estimated Needs:   [] no change since previous assessment  [] recalculated:     Previous Nutrition Diagnosis:   Nutrition Diagnosis is: [] ongoing  [] resolved [] not applicable     New Nutrition Diagnosis: [] Not applicable    Nutrition Care Plan:  [] In Progress  [] Achieved  [] Not applicable    Nutrition Interventions:     Education Provided:       [] Yes:  [] No:        Recommendations:         [] Continue current diet order            [] Add oral nutrition supplement:     [] Discontinue current diet order. Recommend:      [] Add micronutrient supplementation:      [] Continue current micronutrient supplementation:      [] Other:     Monitoring and Evaluation:   Continue to monitor nutritional intake, tolerance to diet prescription, weights, labs, skin integrity      RD remains available upon request and will follow up per protocol Nutrition Follow Up Note  Patient seen for: nutrition consult for calorie count     Chart reviewed, events noted. Chart reviewed, events noted. This is a 68 year old female ith PMH significant for breast cancer s/p L mastectomy, TBI (s/p a fall ~2 years ago) c/b aphasia, meningioma, hypothyroidism (hx Grave's dz s/p radioactive iodine), CVA, and dysphagia who presents to [facility] ED on 12/20/2023 with no PO intake and increased lethargy in the last 2-3 days, admitted for hypernatremia 166 and UTI.    Source: [] Patient       [x] EMR        [] RN        [] Family at bedside       [] Other:    -If unable to interview patient: [] Trach/Vent/BiPAP  [] Disoriented/confused/inappropriate to interview    Diet Order:   Diet, Pureed:   Moderately Thick Liquids (MODTHICKLIQS)  Liquid via Teaspoon Only (12-27-23)    - Is current order appropriate/adequate? [x] Yes  []  No:     - Nutrition-related concerns:  -pt previously on tube feeds of Glucerna 1.2 @ 55ml/hr x24hrs via NGT. NGT dislodged overnight (12/25-12/26), replaced.   -s/p Speech Language Pathologist evaluation on 12/26, recommended for "Puree/moderately thick liquids via 1/2 TSP AMOUNT ONLY"  -NGT feeds discontinued today, diet advanced to pureed diet with moderately thick liquids.   -Calorie count ordered.   -family in agreement to PEG placement if patient fails calorie count per GOC discussion today.     GI:  Last bowel movement 12/27 ___.   Bowel Regimen? [x] Yes   [] No      Weights:   40.8Kg (12/20)    Nutritionally Pertinent MEDICATIONS  (STANDING):  ascorbic acid  folic acid  levothyroxine  multivitamin  polyethylene glycol 3350  senna  thiamine    Pertinent Labs: 12-27 @ 07:06: Na 149<H>, BUN 18, Cr 0.42<L>, <H>, K+ 4.1, Phos 2.5, Mg 2.5, Alk Phos --, ALT/SGPT --, AST/SGOT --, HbA1c --    A1C with Estimated Average Glucose Result: 5.7 % (09-19-23 @ 10:33)    Finger Sticks:  POCT Blood Glucose.: 137 mg/dL (12-27 @ 06:05)  POCT Blood Glucose.: 145 mg/dL (12-26 @ 23:51)  POCT Blood Glucose.: 171 mg/dL (12-26 @ 17:36)  POCT Blood Glucose.: 174 mg/dL (12-26 @ 12:08)      Skin per nursing documentation: right and left  hip deep tissue injury, left buttock deep tissue injury, sacrum deep tissue injury and left shoulder deep tissue injury   Edema: +1 left/right ankle     Estimated Needs:   [x] no change since previous assessment  [] recalculated:   Estimated Needs: based on IBW 52.1 kg  Energy: 0623-5852 kcal/day (30-35 kcal/kg)  Protein: 62-83 g Pro/day (1.2-1.6 g Pro/kg)  Fluid: defer to MD team      Previous Nutrition Diagnosis:   1) Underweight; Malnourished   2) Increased protein-energy needs   Nutrition Diagnosis is: ongoing     New Nutrition Diagnosis: [x] Not applicable    Nutrition Care Plan:  [x] In Progress  [] Achieved  [] Not applicable    Nutrition Interventions:     Education Provided:       [] Yes:  [x] No:        Recommendations:         [x] Defer diet texture/consistency to team/ Speech Language Pathologist     [x] Recommend addition of Glucerna shakes 2x daily to supplement PO intake      [x] Continue current micronutrient supplementation: multivitamin, ascorbic acid      [x] RD to follow-up with results of Calorie count     Monitoring and Evaluation:   Continue to monitor nutritional intake, tolerance to diet prescription, weights, labs, skin integrity      RD remains available upon request and will follow up per protocol  Madelaine Terry RD, CDN, Available on Teams Nutrition Follow Up Note  Patient seen for: nutrition consult for calorie count     Chart reviewed, events noted. Chart reviewed, events noted. This is a 68 year old female ith PMH significant for breast cancer s/p L mastectomy, TBI (s/p a fall ~2 years ago) c/b aphasia, meningioma, hypothyroidism (hx Grave's dz s/p radioactive iodine), CVA, and dysphagia who presents to [facility] ED on 12/20/2023 with no PO intake and increased lethargy in the last 2-3 days, admitted for hypernatremia 166 and UTI.    Source: [] Patient       [x] EMR        [] RN        [] Family at bedside       [] Other:    -If unable to interview patient: [] Trach/Vent/BiPAP  [] Disoriented/confused/inappropriate to interview    Diet Order:   Diet, Pureed:   Moderately Thick Liquids (MODTHICKLIQS)  Liquid via Teaspoon Only (12-27-23)    - Is current order appropriate/adequate? [x] Yes  []  No:     - Nutrition-related concerns:  -pt previously on tube feeds of Glucerna 1.2 @ 55ml/hr x24hrs via NGT. NGT dislodged overnight (12/25-12/26), replaced.   -s/p Speech Language Pathologist evaluation on 12/26, recommended for "Puree/moderately thick liquids via 1/2 TSP AMOUNT ONLY"  -NGT feeds discontinued today, diet advanced to pureed diet with moderately thick liquids.   -Calorie count ordered.   -family in agreement to PEG placement if patient fails calorie count per GOC discussion today.     GI:  Last bowel movement 12/27 ___.   Bowel Regimen? [x] Yes   [] No      Weights:   40.8Kg (12/20)    Nutritionally Pertinent MEDICATIONS  (STANDING):  ascorbic acid  folic acid  levothyroxine  multivitamin  polyethylene glycol 3350  senna  thiamine    Pertinent Labs: 12-27 @ 07:06: Na 149<H>, BUN 18, Cr 0.42<L>, <H>, K+ 4.1, Phos 2.5, Mg 2.5, Alk Phos --, ALT/SGPT --, AST/SGOT --, HbA1c --    A1C with Estimated Average Glucose Result: 5.7 % (09-19-23 @ 10:33)    Finger Sticks:  POCT Blood Glucose.: 137 mg/dL (12-27 @ 06:05)  POCT Blood Glucose.: 145 mg/dL (12-26 @ 23:51)  POCT Blood Glucose.: 171 mg/dL (12-26 @ 17:36)  POCT Blood Glucose.: 174 mg/dL (12-26 @ 12:08)      Skin per nursing documentation: right and left  hip deep tissue injury, left buttock deep tissue injury, sacrum deep tissue injury and left shoulder deep tissue injury   Edema: +1 left/right ankle     Estimated Needs:   [x] no change since previous assessment  [] recalculated:   Estimated Needs: based on IBW 52.1 kg  Energy: 9583-4887 kcal/day (30-35 kcal/kg)  Protein: 62-83 g Pro/day (1.2-1.6 g Pro/kg)  Fluid: defer to MD team      Previous Nutrition Diagnosis:   1) Underweight; Malnourished   2) Increased protein-energy needs   Nutrition Diagnosis is: ongoing     New Nutrition Diagnosis: [x] Not applicable    Nutrition Care Plan:  [x] In Progress  [] Achieved  [] Not applicable    Nutrition Interventions:     Education Provided:       [] Yes:  [x] No:        Recommendations:         [x] Defer diet texture/consistency to team/ Speech Language Pathologist     [x] Recommend addition of Glucerna shakes 2x daily to supplement PO intake      [x] Continue current micronutrient supplementation: multivitamin, ascorbic acid      [x] RD to follow-up with results of Calorie count     Monitoring and Evaluation:   Continue to monitor nutritional intake, tolerance to diet prescription, weights, labs, skin integrity      RD remains available upon request and will follow up per protocol  Madelaine Terry RD, CDN, Available on Teams Nutrition Follow Up Note  Patient seen for: nutrition consult for calorie count     Chart reviewed, events noted. Chart reviewed, events noted. This is a 68 year old female ith PMH significant for breast cancer s/p L mastectomy, TBI (s/p a fall ~2 years ago) c/b aphasia, meningioma, hypothyroidism (hx Grave's dz s/p radioactive iodine), CVA, and dysphagia who presents to [facility] ED on 12/20/2023 with no PO intake and increased lethargy in the last 2-3 days, admitted for hypernatremia 166 and UTI.    Source: [] Patient       [x] EMR        [x] RN        [] Family at bedside       [] Other:    -If unable to interview patient: [] Trach/Vent/BiPAP  [] Disoriented/confused/inappropriate to interview    Diet Order:   Diet, Pureed:   Moderately Thick Liquids (MODTHICKLIQS)  Liquid via Teaspoon Only (12-27-23)    - Is current order appropriate/adequate? [x] Yes  []  No:     - Nutrition-related concerns:  -pt previously on tube feeds of Glucerna 1.2 @ 55ml/hr x24hrs via NGT. NGT dislodged overnight (12/25-12/26), replaced.   -s/p Speech Language Pathologist evaluation on 12/26, recommended for "Puree/moderately thick liquids via 1/2 TSP AMOUNT ONLY"  -NGT feeds discontinued today, diet advanced to pureed diet with moderately thick liquids.   -Calorie count ordered.   -family in agreement to PEG placement if patient fails calorie count per GOC discussion today.   -calore count sheet hung in room, RN aware,     GI:  Last bowel movement 12/27 ___.   Bowel Regimen? [x] Yes   [] No      Weights:   40.8Kg (12/20)    Nutritionally Pertinent MEDICATIONS  (STANDING):  ascorbic acid  folic acid  levothyroxine  multivitamin  polyethylene glycol 3350  senna  thiamine    Pertinent Labs: 12-27 @ 07:06: Na 149<H>, BUN 18, Cr 0.42<L>, <H>, K+ 4.1, Phos 2.5, Mg 2.5, Alk Phos --, ALT/SGPT --, AST/SGOT --, HbA1c --    A1C with Estimated Average Glucose Result: 5.7 % (09-19-23 @ 10:33)    Finger Sticks:  POCT Blood Glucose.: 137 mg/dL (12-27 @ 06:05)  POCT Blood Glucose.: 145 mg/dL (12-26 @ 23:51)  POCT Blood Glucose.: 171 mg/dL (12-26 @ 17:36)  POCT Blood Glucose.: 174 mg/dL (12-26 @ 12:08)      Skin per nursing documentation: right and left  hip deep tissue injury, left buttock deep tissue injury, sacrum deep tissue injury and left shoulder deep tissue injury   Edema: +1 left/right ankle     Estimated Needs:   [x] no change since previous assessment  [] recalculated:   Estimated Needs: based on IBW 52.1 kg  Energy: 8118-8078 kcal/day (30-35 kcal/kg)  Protein: 62-83 g Pro/day (1.2-1.6 g Pro/kg)  Fluid: defer to MD team      Previous Nutrition Diagnosis:   1) Underweight; Malnourished   2) Increased protein-energy needs   Nutrition Diagnosis is: ongoing     New Nutrition Diagnosis: [x] Not applicable    Nutrition Care Plan:  [x] In Progress  [] Achieved  [] Not applicable    Nutrition Interventions:     Education Provided:       [] Yes:  [x] No:        Recommendations:         [x] Defer diet texture/consistency to team/ Speech Language Pathologist     [x] Recommend addition of Glucerna shakes 2x daily to supplement PO intake      [x] Continue current micronutrient supplementation: multivitamin, ascorbic acid      [x] RD to follow-up with results of Calorie count      [x] If EN is warranted and restarted, recommend resuming Glucerna 1.2 @ goal rate of 55 mL/hr x 24hrs. Provides 1584 kcal, 79.2 g Pro, 1062.6 mL water in 1320 mL total volume.       Monitoring and Evaluation:   Continue to monitor nutritional intake, tolerance to diet prescription, weights, labs, skin integrity      RD remains available upon request and will follow up per protocol  Madelaine Terry RD, CDN, Available on Teams Nutrition Follow Up Note  Patient seen for: nutrition consult for calorie count     Chart reviewed, events noted. Chart reviewed, events noted. This is a 68 year old female ith PMH significant for breast cancer s/p L mastectomy, TBI (s/p a fall ~2 years ago) c/b aphasia, meningioma, hypothyroidism (hx Grave's dz s/p radioactive iodine), CVA, and dysphagia who presents to [facility] ED on 12/20/2023 with no PO intake and increased lethargy in the last 2-3 days, admitted for hypernatremia 166 and UTI.    Source: [] Patient       [x] EMR        [x] RN        [] Family at bedside       [] Other:    -If unable to interview patient: [] Trach/Vent/BiPAP  [] Disoriented/confused/inappropriate to interview    Diet Order:   Diet, Pureed:   Moderately Thick Liquids (MODTHICKLIQS)  Liquid via Teaspoon Only (12-27-23)    - Is current order appropriate/adequate? [x] Yes  []  No:     - Nutrition-related concerns:  -pt previously on tube feeds of Glucerna 1.2 @ 55ml/hr x24hrs via NGT. NGT dislodged overnight (12/25-12/26), replaced.   -s/p Speech Language Pathologist evaluation on 12/26, recommended for "Puree/moderately thick liquids via 1/2 TSP AMOUNT ONLY"  -NGT feeds discontinued today, diet advanced to pureed diet with moderately thick liquids.   -Calorie count ordered.   -family in agreement to PEG placement if patient fails calorie count per GOC discussion today.   -calore count sheet hung in room, RN aware,     GI:  Last bowel movement 12/27 ___.   Bowel Regimen? [x] Yes   [] No      Weights:   40.8Kg (12/20)    Nutritionally Pertinent MEDICATIONS  (STANDING):  ascorbic acid  folic acid  levothyroxine  multivitamin  polyethylene glycol 3350  senna  thiamine    Pertinent Labs: 12-27 @ 07:06: Na 149<H>, BUN 18, Cr 0.42<L>, <H>, K+ 4.1, Phos 2.5, Mg 2.5, Alk Phos --, ALT/SGPT --, AST/SGOT --, HbA1c --    A1C with Estimated Average Glucose Result: 5.7 % (09-19-23 @ 10:33)    Finger Sticks:  POCT Blood Glucose.: 137 mg/dL (12-27 @ 06:05)  POCT Blood Glucose.: 145 mg/dL (12-26 @ 23:51)  POCT Blood Glucose.: 171 mg/dL (12-26 @ 17:36)  POCT Blood Glucose.: 174 mg/dL (12-26 @ 12:08)      Skin per nursing documentation: right and left  hip deep tissue injury, left buttock deep tissue injury, sacrum deep tissue injury and left shoulder deep tissue injury   Edema: +1 left/right ankle     Estimated Needs:   [x] no change since previous assessment  [] recalculated:   Estimated Needs: based on IBW 52.1 kg  Energy: 6973-9217 kcal/day (30-35 kcal/kg)  Protein: 62-83 g Pro/day (1.2-1.6 g Pro/kg)  Fluid: defer to MD team      Previous Nutrition Diagnosis:   1) Underweight; Malnourished   2) Increased protein-energy needs   Nutrition Diagnosis is: ongoing     New Nutrition Diagnosis: [x] Not applicable    Nutrition Care Plan:  [x] In Progress  [] Achieved  [] Not applicable    Nutrition Interventions:     Education Provided:       [] Yes:  [x] No:        Recommendations:         [x] Defer diet texture/consistency to team/ Speech Language Pathologist     [x] Recommend addition of Glucerna shakes 2x daily to supplement PO intake      [x] Continue current micronutrient supplementation: multivitamin, ascorbic acid      [x] RD to follow-up with results of Calorie count      [x] If EN is warranted and restarted, recommend resuming Glucerna 1.2 @ goal rate of 55 mL/hr x 24hrs. Provides 1584 kcal, 79.2 g Pro, 1062.6 mL water in 1320 mL total volume.       Monitoring and Evaluation:   Continue to monitor nutritional intake, tolerance to diet prescription, weights, labs, skin integrity      RD remains available upon request and will follow up per protocol  Madelaine Terry RD, CDN, Available on Teams Nutrition Follow Up Note  Patient seen for: nutrition consult for calorie count     Chart reviewed, events noted. Chart reviewed, events noted. This is a 68 year old female ith PMH significant for breast cancer s/p L mastectomy, TBI (s/p a fall ~2 years ago) c/b aphasia, meningioma, hypothyroidism (hx Grave's dz s/p radioactive iodine), CVA, and dysphagia who presents to [facility] ED on 12/20/2023 with no PO intake and increased lethargy in the last 2-3 days, admitted for hypernatremia 166 and UTI.    Source: [] Patient       [x] EMR        [x] RN        [] Family at bedside       [x] Other: Team 5 Usha Reyes    -If unable to interview patient: [] Trach/Vent/BiPAP  [x] Disoriented/confused/inappropriate to interview    Diet Order:   Diet, Pureed:   Moderately Thick Liquids (MODTHICKLIQS)  Liquid via Teaspoon Only (12-27-23)    - Is current order appropriate/adequate? [x] Yes  []  No:     - Nutrition-related concerns:  -pt seen this morning, previously on tube feeds of Glucerna 1.2 @ 55ml/hr x24hrs via NGT. NGT dislodged overnight (12/25-12/26), replaced.   -s/p Speech Language Pathologist evaluation on 12/26, recommended for "Puree/moderately thick liquids via 1/2 TSP AMOUNT ONLY"  -NGT feeds discontinued today, diet advanced to pureed diet with moderately thick liquids.   -Calorie count ordered, calore count sheet hung in room, RN aware,   -family in agreement to PEG placement if patient fails calorie count per GOC discussion today.     * Per discussion with team later in the afternoon, plan to resume NGT feeds at this time and hold off on PO diet and calorie count due to lethargy.       GI:  Last bowel movement 12/27 ___.   Bowel Regimen? [x] Yes   [] No      Weights:   40.8Kg (12/20)    Nutritionally Pertinent MEDICATIONS  (STANDING):  ascorbic acid  folic acid  levothyroxine  multivitamin  polyethylene glycol 3350  senna  thiamine    Pertinent Labs: 12-27 @ 07:06: Na 149<H>, BUN 18, Cr 0.42<L>, <H>, K+ 4.1, Phos 2.5, Mg 2.5, Alk Phos --, ALT/SGPT --, AST/SGOT --, HbA1c --    A1C with Estimated Average Glucose Result: 5.7 % (09-19-23 @ 10:33)    Finger Sticks:  POCT Blood Glucose.: 137 mg/dL (12-27 @ 06:05)  POCT Blood Glucose.: 145 mg/dL (12-26 @ 23:51)  POCT Blood Glucose.: 171 mg/dL (12-26 @ 17:36)  POCT Blood Glucose.: 174 mg/dL (12-26 @ 12:08)      Skin per nursing documentation: right and left  hip deep tissue injury, left buttock deep tissue injury, sacrum deep tissue injury and left shoulder deep tissue injury   Edema: +1 left/right ankle     Estimated Needs:   [x] no change since previous assessment  [] recalculated:   Estimated Needs: based on IBW 52.1 kg  Energy: 5375-8619 kcal/day (30-35 kcal/kg)  Protein: 62-83 g Pro/day (1.2-1.6 g Pro/kg)  Fluid: defer to MD team      Previous Nutrition Diagnosis:   1) Underweight; Malnourished   2) Increased protein-energy needs   Nutrition Diagnosis is: ongoing     New Nutrition Diagnosis: [x] Not applicable    Nutrition Care Plan:  [x] In Progress  [] Achieved  [] Not applicable    Nutrition Interventions:     Education Provided:       [] Yes:  [x] No:        Recommendations:         [x] Defer diet texture/consistency to team/ Speech Language Pathologist/GOC     [x] Continue current micronutrient supplementation: multivitamin, ascorbic acid. Continue Carlos BID     [x] Continue Glucerna 1.2 @ goal rate of 55 mL/hr x 24hrs. Provides 1584 kcal, 79.2 g Pro, 1062.6 mL water in 1320 mL total volume.   *per discussion with Team later in the day, now plan to hold off on calorie count at this time due to lethargy.       Monitoring and Evaluation:   Continue to monitor nutritional intake, tolerance to diet prescription, weights, labs, skin integrity      RD remains available upon request and will follow up per protocol  Madelaine Terry RD, CDN, Available on Teams Nutrition Follow Up Note  Patient seen for: nutrition consult for calorie count     Chart reviewed, events noted. Chart reviewed, events noted. This is a 68 year old female ith PMH significant for breast cancer s/p L mastectomy, TBI (s/p a fall ~2 years ago) c/b aphasia, meningioma, hypothyroidism (hx Grave's dz s/p radioactive iodine), CVA, and dysphagia who presents to [facility] ED on 12/20/2023 with no PO intake and increased lethargy in the last 2-3 days, admitted for hypernatremia 166 and UTI.    Source: [] Patient       [x] EMR        [x] RN        [] Family at bedside       [x] Other: Team 5 Usha Reyes    -If unable to interview patient: [] Trach/Vent/BiPAP  [x] Disoriented/confused/inappropriate to interview    Diet Order:   Diet, Pureed:   Moderately Thick Liquids (MODTHICKLIQS)  Liquid via Teaspoon Only (12-27-23)    - Is current order appropriate/adequate? [x] Yes  []  No:     - Nutrition-related concerns:  -pt seen this morning, previously on tube feeds of Glucerna 1.2 @ 55ml/hr x24hrs via NGT. NGT dislodged overnight (12/25-12/26), replaced.   -s/p Speech Language Pathologist evaluation on 12/26, recommended for "Puree/moderately thick liquids via 1/2 TSP AMOUNT ONLY"  -NGT feeds discontinued today, diet advanced to pureed diet with moderately thick liquids.   -Calorie count ordered, calore count sheet hung in room, RN aware,   -family in agreement to PEG placement if patient fails calorie count per GOC discussion today.     * Per discussion with team later in the afternoon, plan to resume NGT feeds at this time and hold off on PO diet and calorie count due to lethargy.       GI:  Last bowel movement 12/27 ___.   Bowel Regimen? [x] Yes   [] No      Weights:   40.8Kg (12/20)    Nutritionally Pertinent MEDICATIONS  (STANDING):  ascorbic acid  folic acid  levothyroxine  multivitamin  polyethylene glycol 3350  senna  thiamine    Pertinent Labs: 12-27 @ 07:06: Na 149<H>, BUN 18, Cr 0.42<L>, <H>, K+ 4.1, Phos 2.5, Mg 2.5, Alk Phos --, ALT/SGPT --, AST/SGOT --, HbA1c --    A1C with Estimated Average Glucose Result: 5.7 % (09-19-23 @ 10:33)    Finger Sticks:  POCT Blood Glucose.: 137 mg/dL (12-27 @ 06:05)  POCT Blood Glucose.: 145 mg/dL (12-26 @ 23:51)  POCT Blood Glucose.: 171 mg/dL (12-26 @ 17:36)  POCT Blood Glucose.: 174 mg/dL (12-26 @ 12:08)      Skin per nursing documentation: right and left  hip deep tissue injury, left buttock deep tissue injury, sacrum deep tissue injury and left shoulder deep tissue injury   Edema: +1 left/right ankle     Estimated Needs:   [x] no change since previous assessment  [] recalculated:   Estimated Needs: based on IBW 52.1 kg  Energy: 5088-0564 kcal/day (30-35 kcal/kg)  Protein: 62-83 g Pro/day (1.2-1.6 g Pro/kg)  Fluid: defer to MD team      Previous Nutrition Diagnosis:   1) Underweight; Malnourished   2) Increased protein-energy needs   Nutrition Diagnosis is: ongoing     New Nutrition Diagnosis: [x] Not applicable    Nutrition Care Plan:  [x] In Progress  [] Achieved  [] Not applicable    Nutrition Interventions:     Education Provided:       [] Yes:  [x] No:        Recommendations:         [x] Defer diet texture/consistency to team/ Speech Language Pathologist/GOC     [x] Continue current micronutrient supplementation: multivitamin, ascorbic acid. Continue Carlos BID     [x] Continue Glucerna 1.2 @ goal rate of 55 mL/hr x 24hrs. Provides 1584 kcal, 79.2 g Pro, 1062.6 mL water in 1320 mL total volume.   *per discussion with Team later in the day, now plan to hold off on calorie count at this time due to lethargy.       Monitoring and Evaluation:   Continue to monitor nutritional intake, tolerance to diet prescription, weights, labs, skin integrity      RD remains available upon request and will follow up per protocol  Madelaine Terry RD, CDN, Available on Teams

## 2023-12-27 NOTE — PROGRESS NOTE ADULT - ATTENDING COMMENTS
Metabolic encephalopathy in setting of hypernatremia with UTI- requiring NGT for fluid and nutrition   Hypernatremia- free water via NGT  UTI- s/p 3 days ceftriaxone completed 12/24  Dysphagia of unclear etiology with severe malnutrition-  requesting PEG evaluation- continue tube feeds via NGT  Functional quadriplegia with multiple pressure ulcers- wound care following   Hypothyroidism- Continue levothyroxine IV 56 mcg QD)  Palliative care consult appreciated- spouse requesting further evaluation for reversible causes of current conditions

## 2023-12-27 NOTE — PROGRESS NOTE ADULT - PROBLEM SELECTOR PLAN 2
Symptoms of lethargy/encephalopathy >48h with no PO intake, likely has chronic hypernatremia (vs acute) 2/2 severe dehydration. Na 166 on admission, improved w/ D5.   Na 150    - monitor BMP daily  - increase free water 200 q8h -> 250 q6h Symptoms of lethargy/encephalopathy >48h with no PO intake, likely has chronic hypernatremia (vs acute) 2/2 severe dehydration. Na 166 on admission, improved w/ D5.   Na 150->149    - monitor BMP daily  - continue free water 250 q6h

## 2023-12-27 NOTE — PROGRESS NOTE ADULT - PROBLEM SELECTOR PLAN 3
Hx of dysphagia since 2-3 months ago per son. Notable for severe b/l ext contracture and cachexia. Low PO intake at baseline, no PO intake in the last 2-3 days. NGT placed 12/22, trickle feeds.     - pt hyperglycemic on jevity, per nutrition recs -> changed to glucerna 55cc/hr  - per discussion w/  who is HCP, feeding tube is in line w/ patient's wishes.   - f/u palliative care consult  - monitor for refeeding syndrome Hx of dysphagia since 2-3 months ago per son. Notable for severe b/l ext contracture and cachexia. Low PO intake at baseline, no PO intake in the last 2-3 days. NGT placed 12/22, trickle feeds.     - pt hyperglycemic on jevity, per nutrition recs -> changed to glucerna 55cc/hr  - per discussion w/  who is HCP, feeding tube is in line w/ patient's wishes.   - f/u palliative care consult -  not ready to make decisions on advanced directions. would like to pursue aggressive workup for dysphagia.   - monitor for refeeding syndrome

## 2023-12-27 NOTE — PROGRESS NOTE ADULT - NSPROGADDITIONALINFOA_GEN_ALL_CORE
Although patient meets hospice criteria in the setting of dysphagia, given wishes of the family to pursue aggressive interventions including LST (i.e. PEG if needed), a referral is NOT appropriate at this time. Goals are aligned for ongoing medical management.   Primary team updated on the above conversation. Palliative care will sign off.  Reconsult as needed. [phone] Although patient meets hospice criteria in the setting of dysphagia, given wishes of the family to pursue aggressive interventions including LST (i.e. PEG if needed), a referral is NOT appropriate at this time. Goals are aligned for ongoing medical management.   Primary team updated on the above conversation. Palliative care will sign off.  Reconsult as needed. [phone]

## 2023-12-27 NOTE — PROGRESS NOTE ADULT - NUTRITIONAL ASSESSMENT
This patient has been assessed with a concern for Malnutrition and has been determined to have a diagnosis/diagnoses of Severe protein-calorie malnutrition and Underweight (BMI < 19).    This patient is being managed with:   Diet NPO with Tube Feed-  Tube Feeding Modality: Nasogastric  Glucerna 1.2 Vidal (GLUCERNARTH)  Total Volume for 24 Hours (mL): 1320  Continuous  Starting Tube Feed Rate {mL per Hour}: 55  Increase Tube Feed Rate by (mL): 0     Every 6 hours  Until Goal Tube Feed Rate (mL per Hour): 55  Tube Feed Duration (in Hours): 24  Tube Feed Start Time: 00:00  Carlos(7 Gm Arginine/7 Gm Glut/1.2 Gm HMB     Qty per Day:  2  Entered: Dec 26 2023  3:32PM

## 2023-12-27 NOTE — PROGRESS NOTE ADULT - PROBLEM SELECTOR PLAN 5
Multiple pressure ulcers noted on physical exam with varying stages (I-III), some with peeling and discoloration, possible tissue loss  - Son reports pt using Santyl topical at home  - wound care consulted, appreciate recs

## 2023-12-28 LAB
ANION GAP SERPL CALC-SCNC: 8 MMOL/L — SIGNIFICANT CHANGE UP (ref 5–17)
ANION GAP SERPL CALC-SCNC: 8 MMOL/L — SIGNIFICANT CHANGE UP (ref 5–17)
APPEARANCE UR: ABNORMAL
APPEARANCE UR: ABNORMAL
BACTERIA # UR AUTO: ABNORMAL /HPF
BASOPHILS # BLD AUTO: 0.02 K/UL — SIGNIFICANT CHANGE UP (ref 0–0.2)
BASOPHILS # BLD AUTO: 0.02 K/UL — SIGNIFICANT CHANGE UP (ref 0–0.2)
BASOPHILS NFR BLD AUTO: 0.3 % — SIGNIFICANT CHANGE UP (ref 0–2)
BASOPHILS NFR BLD AUTO: 0.3 % — SIGNIFICANT CHANGE UP (ref 0–2)
BILIRUB UR-MCNC: NEGATIVE — SIGNIFICANT CHANGE UP
BILIRUB UR-MCNC: NEGATIVE — SIGNIFICANT CHANGE UP
BUN SERPL-MCNC: 19 MG/DL — SIGNIFICANT CHANGE UP (ref 7–23)
BUN SERPL-MCNC: 19 MG/DL — SIGNIFICANT CHANGE UP (ref 7–23)
CALCIUM SERPL-MCNC: 7.7 MG/DL — LOW (ref 8.4–10.5)
CALCIUM SERPL-MCNC: 7.7 MG/DL — LOW (ref 8.4–10.5)
CHLORIDE SERPL-SCNC: 114 MMOL/L — HIGH (ref 96–108)
CHLORIDE SERPL-SCNC: 114 MMOL/L — HIGH (ref 96–108)
CO2 SERPL-SCNC: 28 MMOL/L — SIGNIFICANT CHANGE UP (ref 22–31)
CO2 SERPL-SCNC: 28 MMOL/L — SIGNIFICANT CHANGE UP (ref 22–31)
COD CRY URNS QL: PRESENT
COLOR SPEC: SIGNIFICANT CHANGE UP
COLOR SPEC: SIGNIFICANT CHANGE UP
CREAT SERPL-MCNC: 0.4 MG/DL — LOW (ref 0.5–1.3)
CREAT SERPL-MCNC: 0.4 MG/DL — LOW (ref 0.5–1.3)
DIFF PNL FLD: ABNORMAL
DIFF PNL FLD: ABNORMAL
EGFR: 106 ML/MIN/1.73M2 — SIGNIFICANT CHANGE UP
EGFR: 106 ML/MIN/1.73M2 — SIGNIFICANT CHANGE UP
EOSINOPHIL # BLD AUTO: 0.03 K/UL — SIGNIFICANT CHANGE UP (ref 0–0.5)
EOSINOPHIL # BLD AUTO: 0.03 K/UL — SIGNIFICANT CHANGE UP (ref 0–0.5)
EOSINOPHIL NFR BLD AUTO: 0.4 % — SIGNIFICANT CHANGE UP (ref 0–6)
EOSINOPHIL NFR BLD AUTO: 0.4 % — SIGNIFICANT CHANGE UP (ref 0–6)
GLUCOSE BLDC GLUCOMTR-MCNC: 135 MG/DL — HIGH (ref 70–99)
GLUCOSE BLDC GLUCOMTR-MCNC: 135 MG/DL — HIGH (ref 70–99)
GLUCOSE BLDC GLUCOMTR-MCNC: 138 MG/DL — HIGH (ref 70–99)
GLUCOSE BLDC GLUCOMTR-MCNC: 138 MG/DL — HIGH (ref 70–99)
GLUCOSE BLDC GLUCOMTR-MCNC: 145 MG/DL — HIGH (ref 70–99)
GLUCOSE BLDC GLUCOMTR-MCNC: 145 MG/DL — HIGH (ref 70–99)
GLUCOSE BLDC GLUCOMTR-MCNC: 155 MG/DL — HIGH (ref 70–99)
GLUCOSE BLDC GLUCOMTR-MCNC: 155 MG/DL — HIGH (ref 70–99)
GLUCOSE SERPL-MCNC: 134 MG/DL — HIGH (ref 70–99)
GLUCOSE SERPL-MCNC: 134 MG/DL — HIGH (ref 70–99)
GLUCOSE UR QL: NEGATIVE MG/DL — SIGNIFICANT CHANGE UP
GLUCOSE UR QL: NEGATIVE MG/DL — SIGNIFICANT CHANGE UP
HCT VFR BLD CALC: 34.6 % — SIGNIFICANT CHANGE UP (ref 34.5–45)
HCT VFR BLD CALC: 34.6 % — SIGNIFICANT CHANGE UP (ref 34.5–45)
HGB BLD-MCNC: 10.4 G/DL — LOW (ref 11.5–15.5)
HGB BLD-MCNC: 10.4 G/DL — LOW (ref 11.5–15.5)
IMM GRANULOCYTES NFR BLD AUTO: 0.3 % — SIGNIFICANT CHANGE UP (ref 0–0.9)
IMM GRANULOCYTES NFR BLD AUTO: 0.3 % — SIGNIFICANT CHANGE UP (ref 0–0.9)
KETONES UR-MCNC: NEGATIVE MG/DL — SIGNIFICANT CHANGE UP
KETONES UR-MCNC: NEGATIVE MG/DL — SIGNIFICANT CHANGE UP
LEUKOCYTE ESTERASE UR-ACNC: ABNORMAL
LEUKOCYTE ESTERASE UR-ACNC: ABNORMAL
LYMPHOCYTES # BLD AUTO: 0.96 K/UL — LOW (ref 1–3.3)
LYMPHOCYTES # BLD AUTO: 0.96 K/UL — LOW (ref 1–3.3)
LYMPHOCYTES # BLD AUTO: 13.2 % — SIGNIFICANT CHANGE UP (ref 13–44)
LYMPHOCYTES # BLD AUTO: 13.2 % — SIGNIFICANT CHANGE UP (ref 13–44)
MAGNESIUM SERPL-MCNC: 2.4 MG/DL — SIGNIFICANT CHANGE UP (ref 1.6–2.6)
MAGNESIUM SERPL-MCNC: 2.4 MG/DL — SIGNIFICANT CHANGE UP (ref 1.6–2.6)
MCHC RBC-ENTMCNC: 26.7 PG — LOW (ref 27–34)
MCHC RBC-ENTMCNC: 26.7 PG — LOW (ref 27–34)
MCHC RBC-ENTMCNC: 30.1 GM/DL — LOW (ref 32–36)
MCHC RBC-ENTMCNC: 30.1 GM/DL — LOW (ref 32–36)
MCV RBC AUTO: 88.9 FL — SIGNIFICANT CHANGE UP (ref 80–100)
MCV RBC AUTO: 88.9 FL — SIGNIFICANT CHANGE UP (ref 80–100)
MONOCYTES # BLD AUTO: 0.52 K/UL — SIGNIFICANT CHANGE UP (ref 0–0.9)
MONOCYTES # BLD AUTO: 0.52 K/UL — SIGNIFICANT CHANGE UP (ref 0–0.9)
MONOCYTES NFR BLD AUTO: 7.2 % — SIGNIFICANT CHANGE UP (ref 2–14)
MONOCYTES NFR BLD AUTO: 7.2 % — SIGNIFICANT CHANGE UP (ref 2–14)
NEUTROPHILS # BLD AUTO: 5.7 K/UL — SIGNIFICANT CHANGE UP (ref 1.8–7.4)
NEUTROPHILS # BLD AUTO: 5.7 K/UL — SIGNIFICANT CHANGE UP (ref 1.8–7.4)
NEUTROPHILS NFR BLD AUTO: 78.6 % — HIGH (ref 43–77)
NEUTROPHILS NFR BLD AUTO: 78.6 % — HIGH (ref 43–77)
NITRITE UR-MCNC: NEGATIVE — SIGNIFICANT CHANGE UP
NITRITE UR-MCNC: NEGATIVE — SIGNIFICANT CHANGE UP
NRBC # BLD: 0 /100 WBCS — SIGNIFICANT CHANGE UP (ref 0–0)
NRBC # BLD: 0 /100 WBCS — SIGNIFICANT CHANGE UP (ref 0–0)
PH UR: 6 — SIGNIFICANT CHANGE UP (ref 5–8)
PH UR: 6 — SIGNIFICANT CHANGE UP (ref 5–8)
PHOSPHATE SERPL-MCNC: 2.6 MG/DL — SIGNIFICANT CHANGE UP (ref 2.5–4.5)
PHOSPHATE SERPL-MCNC: 2.6 MG/DL — SIGNIFICANT CHANGE UP (ref 2.5–4.5)
PLATELET # BLD AUTO: 190 K/UL — SIGNIFICANT CHANGE UP (ref 150–400)
PLATELET # BLD AUTO: 190 K/UL — SIGNIFICANT CHANGE UP (ref 150–400)
POTASSIUM SERPL-MCNC: 3.9 MMOL/L — SIGNIFICANT CHANGE UP (ref 3.5–5.3)
POTASSIUM SERPL-MCNC: 3.9 MMOL/L — SIGNIFICANT CHANGE UP (ref 3.5–5.3)
POTASSIUM SERPL-SCNC: 3.9 MMOL/L — SIGNIFICANT CHANGE UP (ref 3.5–5.3)
POTASSIUM SERPL-SCNC: 3.9 MMOL/L — SIGNIFICANT CHANGE UP (ref 3.5–5.3)
PROT UR-MCNC: 30 MG/DL
PROT UR-MCNC: 30 MG/DL
RAPID RVP RESULT: SIGNIFICANT CHANGE UP
RBC # BLD: 3.89 M/UL — SIGNIFICANT CHANGE UP (ref 3.8–5.2)
RBC # BLD: 3.89 M/UL — SIGNIFICANT CHANGE UP (ref 3.8–5.2)
RBC # FLD: 16.1 % — HIGH (ref 10.3–14.5)
RBC # FLD: 16.1 % — HIGH (ref 10.3–14.5)
RBC CASTS # UR COMP ASSIST: 5 /HPF — HIGH (ref 0–4)
SARS-COV-2 RNA SPEC QL NAA+PROBE: SIGNIFICANT CHANGE UP
SODIUM SERPL-SCNC: 150 MMOL/L — HIGH (ref 135–145)
SODIUM SERPL-SCNC: 150 MMOL/L — HIGH (ref 135–145)
SP GR SPEC: 1.02 — SIGNIFICANT CHANGE UP (ref 1–1.03)
SP GR SPEC: 1.02 — SIGNIFICANT CHANGE UP (ref 1–1.03)
UROBILINOGEN FLD QL: 1 MG/DL — SIGNIFICANT CHANGE UP (ref 0.2–1)
UROBILINOGEN FLD QL: 1 MG/DL — SIGNIFICANT CHANGE UP (ref 0.2–1)
WBC # BLD: 7.25 K/UL — SIGNIFICANT CHANGE UP (ref 3.8–10.5)
WBC # BLD: 7.25 K/UL — SIGNIFICANT CHANGE UP (ref 3.8–10.5)
WBC # FLD AUTO: 7.25 K/UL — SIGNIFICANT CHANGE UP (ref 3.8–10.5)
WBC # FLD AUTO: 7.25 K/UL — SIGNIFICANT CHANGE UP (ref 3.8–10.5)
WBC UR QL: 20 /HPF — HIGH (ref 0–5)

## 2023-12-28 PROCEDURE — 71045 X-RAY EXAM CHEST 1 VIEW: CPT | Mod: 26,76

## 2023-12-28 PROCEDURE — 97597 DBRDMT OPN WND 1ST 20 CM/<: CPT

## 2023-12-28 PROCEDURE — 99233 SBSQ HOSP IP/OBS HIGH 50: CPT | Mod: 25

## 2023-12-28 PROCEDURE — 99233 SBSQ HOSP IP/OBS HIGH 50: CPT | Mod: GC

## 2023-12-28 PROCEDURE — 97598 DBRDMT OPN WND ADDL 20CM/<: CPT

## 2023-12-28 RX ORDER — CEFTRIAXONE 500 MG/1
1000 INJECTION, POWDER, FOR SOLUTION INTRAMUSCULAR; INTRAVENOUS EVERY 24 HOURS
Refills: 0 | Status: DISCONTINUED | OUTPATIENT
Start: 2023-12-28 | End: 2023-12-29

## 2023-12-28 RX ORDER — SODIUM CHLORIDE 9 MG/ML
500 INJECTION INTRAMUSCULAR; INTRAVENOUS; SUBCUTANEOUS ONCE
Refills: 0 | Status: COMPLETED | OUTPATIENT
Start: 2023-12-28 | End: 2023-12-28

## 2023-12-28 RX ADMIN — CEFTRIAXONE 100 MILLIGRAM(S): 500 INJECTION, POWDER, FOR SOLUTION INTRAMUSCULAR; INTRAVENOUS at 14:10

## 2023-12-28 RX ADMIN — Medication 100 MILLIGRAM(S): at 11:19

## 2023-12-28 RX ADMIN — SENNA PLUS 2 TABLET(S): 8.6 TABLET ORAL at 21:55

## 2023-12-28 RX ADMIN — ENOXAPARIN SODIUM 40 MILLIGRAM(S): 100 INJECTION SUBCUTANEOUS at 05:19

## 2023-12-28 RX ADMIN — Medication 1 APPLICATION(S): at 11:19

## 2023-12-28 RX ADMIN — Medication 112 MICROGRAM(S): at 05:19

## 2023-12-28 RX ADMIN — Medication 1 MILLIGRAM(S): at 11:19

## 2023-12-28 RX ADMIN — Medication 1 TABLET(S): at 11:19

## 2023-12-28 RX ADMIN — SODIUM CHLORIDE 500 MILLILITER(S): 9 INJECTION INTRAMUSCULAR; INTRAVENOUS; SUBCUTANEOUS at 16:53

## 2023-12-28 RX ADMIN — Medication 500 MILLIGRAM(S): at 11:19

## 2023-12-28 RX ADMIN — SERTRALINE 25 MILLIGRAM(S): 25 TABLET, FILM COATED ORAL at 11:19

## 2023-12-28 NOTE — PROGRESS NOTE ADULT - ASSESSMENT
68F with PMH significant for breast cancer s/p L mastectomy, TBI (s/p a fall ~2 years ago) c/b aphasia, meningioma, hypothyroidism (hx Grave's dz s/p radioactive iodine), CVA, and dysphagia who presents to [facility] ED on 12/20/2023 with no PO intake and increased lethargy in the last 2-3 days, admitted for hypernatremia 166 and UTI.    68F with PMH significant for breast cancer s/p L mastectomy, TBI (s/p a fall ~2 years ago) c/b aphasia, meningioma, hypothyroidism (hx Grave's dz s/p radioactive iodine), CVA, and dysphagia who presents to [facility] ED on 12/20/2023 with no PO intake and increased lethargy in the last 2-3 days, admitted for hypernatremia 166 and UTI.

## 2023-12-28 NOTE — PHYSICAL THERAPY INITIAL EVALUATION ADULT - LEVEL OF INDEPENDENCE: SIT/SUPINE, REHAB EVAL
pt not following any commands, grimaces in discomfort with gentle passive stretching/unable to perform

## 2023-12-28 NOTE — PROGRESS NOTE ADULT - SUBJECTIVE AND OBJECTIVE BOX
*******************************  Usha Reyes MD (PGY-1)  Internal Medicine  Contact via [employer] TEAMS  *******************************    YOUNGER, JONATHAN  68y  Female    Patient is a 68y old  Female who presents with a chief complaint of No PO intake, lethargy (27 Dec 2023 10:14)      Subjective:    Objective:  T(C): 36.4 (12-28-23 @ 04:11), Max: 38.1 (12-27-23 @ 20:55)  HR: 82 (12-28-23 @ 04:11) (82 - 96)  BP: 96/58 (12-28-23 @ 04:11) (93/60 - 99/65)  RR: 18 (12-28-23 @ 04:11) (18 - 18)  SpO2: 95% (12-28-23 @ 04:11) (95% - 96%)  I&O's Summary      PHYSICAL EXAM:  GENERAL: NAD, well-groomed, well-developed  HEAD:  Atraumatic, Normocephalic  EYES: EOMI, PERRLA, conjunctiva and sclera clear  ENMT: No tonsillar erythema, exudates, or enlargement; Moist mucous membranes, Good dentition, No lesions  NECK: Supple, No JVD, Normal thyroid  NERVOUS SYSTEM:  Alert & Oriented X3, Good concentration; Motor Strength 5/5 B/L upper and lower extremities; DTRs 2+ intact and symmetric  CHEST/LUNG: Clear to auscultation bilaterally; No rales, rhonchi, wheezing, or rubs  HEART: Regular rate and rhythm; No murmurs, rubs, or gallops  ABDOMEN: Soft, Nontender, Nondistended; Bowel sounds present  EXTREMITIES:  2+ Peripheral Pulses, No clubbing, cyanosis, or edema  LYMPH: No lymphadenopathy noted  SKIN: No rashes or lesions    LABS:      CAPILLARY BLOOD GLUCOSE      POCT Blood Glucose.: 145 mg/dL (28 Dec 2023 06:02)  POCT Blood Glucose.: 155 mg/dL (28 Dec 2023 01:11)  POCT Blood Glucose.: 163 mg/dL (27 Dec 2023 19:45)  POCT Blood Glucose.: 148 mg/dL (27 Dec 2023 12:18)      RADIOLOGY & ADDITIONAL TESTS:    MEDICATIONS  (STANDING):  ascorbic acid 500 milliGRAM(s) Oral daily  collagenase Ointment 1 Application(s) Topical daily  enoxaparin Injectable 40 milliGRAM(s) SubCutaneous every 24 hours  folic acid 1 milliGRAM(s) Oral daily  levothyroxine 112 MICROGram(s) Oral daily  multivitamin 1 Tablet(s) Oral daily  polyethylene glycol 3350 17 Gram(s) Oral daily  senna 2 Tablet(s) Oral at bedtime  sertraline 25 milliGRAM(s) Oral daily  thiamine 100 milliGRAM(s) Oral daily    MEDICATIONS  (PRN):             *******************************  Usha Reyes MD (PGY-1)  Internal Medicine  Contact via [employer] TEAMS  *******************************    YOUNGER, JONATHAN  68y  Female    Patient is a 68y old  Female who presents with a chief complaint of No PO intake, lethargy (27 Dec 2023 10:14)    Subjective: Overnight, axillary temp 100.5, resolved w/ tylenol, ordered infectious workup. Seen at bedside this morning, opened eyes to voice. Unable to participate in history taking.     Objective:  T(C): 36.4 (12-28-23 @ 04:11), Max: 38.1 (12-27-23 @ 20:55)  HR: 82 (12-28-23 @ 04:11) (82 - 96)  BP: 96/58 (12-28-23 @ 04:11) (93/60 - 99/65)  RR: 18 (12-28-23 @ 04:11) (18 - 18)  SpO2: 95% (12-28-23 @ 04:11) (95% - 96%)  I&O's Summary    PHYSICAL EXAM:  GENERAL: NAD, cachetic, chronically ill-appearing   HEAD:  Atraumatic, Normocephalic  EYES: EOMI, PERRLA, conjunctiva and sclera clear  ENMT: dry mucous membranes  NECK: Supple, trachea midline   NERVOUS SYSTEM:  Alert & Oriented X0, opens eyes to voice   CHEST/LUNG: Clear to auscultation bilaterally; No rales, rhonchi, wheezing, or rubs  HEART: Regular rate and rhythm; No murmurs, rubs, or gallops  ABDOMEN: Soft, Nontender, Nondistended; Bowel sounds present  EXTREMITIES:  2+ Peripheral Pulses, No clubbing, cyanosis, or edema, contracted   LYMPH: No lymphadenopathy noted  SKIN: No rashes or lesions    LABS:                   10.4   7.25  )-----------( 190      ( 28 Dec 2023 07:02 )             34.6     12-28    150<H>  |  114<H>  |  19  ----------------------------<  134<H>  3.9   |  28  |  0.40<L>    Ca    7.7<L>      28 Dec 2023 07:00  Phos  2.6     12-28  Mg     2.4     12-28    Culture Results:   >100,000 CFU/ml Escherichia coli (12-20 @ 11:54)  Culture Results:   No growth at 5 days (12-20 @ 11:30)  Culture Results:   No growth at 5 days (12-20 @ 11:15)    CAPILLARY BLOOD GLUCOSE  POCT Blood Glucose.: 145 mg/dL (28 Dec 2023 06:02)  POCT Blood Glucose.: 155 mg/dL (28 Dec 2023 01:11)  POCT Blood Glucose.: 163 mg/dL (27 Dec 2023 19:45)  POCT Blood Glucose.: 148 mg/dL (27 Dec 2023 12:18)    RADIOLOGY & ADDITIONAL TESTS:    MEDICATIONS  (STANDING):  ascorbic acid 500 milliGRAM(s) Oral daily  collagenase Ointment 1 Application(s) Topical daily  enoxaparin Injectable 40 milliGRAM(s) SubCutaneous every 24 hours  folic acid 1 milliGRAM(s) Oral daily  levothyroxine 112 MICROGram(s) Oral daily  multivitamin 1 Tablet(s) Oral daily  polyethylene glycol 3350 17 Gram(s) Oral daily  senna 2 Tablet(s) Oral at bedtime  sertraline 25 milliGRAM(s) Oral daily  thiamine 100 milliGRAM(s) Oral daily    MEDICATIONS  (PRN):

## 2023-12-28 NOTE — PROGRESS NOTE ADULT - PROBLEM SELECTOR PLAN 3
Hx of dysphagia since 2-3 months ago per son. Notable for severe b/l ext contracture and cachexia. Low PO intake at baseline, no PO intake in the last 2-3 days. NGT placed 12/22, trickle feeds.     - pt hyperglycemic on jevity, per nutrition recs -> changed to glucerna 55cc/hr  - per discussion w/  who is HCP, feeding tube is in line w/ patient's wishes.   - f/u palliative care consult -  not ready to make decisions on advanced directions. would like to pursue aggressive workup for dysphagia.   - monitor for refeeding syndrome Hx of dysphagia since 2-3 months ago per son. Notable for severe b/l ext contracture and cachexia. Low PO intake at baseline, no PO intake in the last 2-3 days. NGT placed 12/22, trickle feeds.   S&S: Puree/moderately thick liquids via 1/2 tsp amount only; however pt unlikely to get adequate calories.     - pt hyperglycemic on jevity, per nutrition recs -> changed to glucerna 55cc/hr  - per discussion w/  who is HCP, feeding tube is in line w/ patient's wishes.   - f/u palliative care consult -  not ready to make decisions on advanced directions. would like to pursue aggressive workup for dysphagia.   - monitor for refeeding syndrome

## 2023-12-28 NOTE — PHYSICAL THERAPY INITIAL EVALUATION ADULT - MUSCLE TONE ASSESSMENT, REHAB EVAL
BUE and BLE L>R/moderately increased tone
increased tightness in all extremities-notable contractures in UE & LE, notable pressure injuries on back/sacrum

## 2023-12-28 NOTE — PHYSICAL THERAPY INITIAL EVALUATION ADULT - PATIENT/FAMILY AGREES WITH PLAN
PT discussed recommendation for long term care with pt's , Dr. Arthur. spouse did not state agreement or disagreement

## 2023-12-28 NOTE — PROGRESS NOTE ADULT - PROBLEM SELECTOR PLAN 1
P/w progressively worsening lethargy/encephalopathy i/s/o no PO intake for 2-3 days; at baseline, answers simple questions (Y/N, name) per son. Most likely ddx include metabolic (hypernatremia) vs infection (c/f UTI) vs mixed    - Currently AOx0, non-verbal, opens eyes to voice, unable to maintain attention or follow commands  - Manage chronic hypernatremia (see the specific section of the plan)  - now completed UTI treatment course P/w progressively worsening lethargy/encephalopathy i/s/o no PO intake for 2-3 days; at baseline, answers simple questions (Y/N, name) per son. Most likely ddx include metabolic (hypernatremia) vs infection (c/f UTI) vs mixed    - Currently AOx0, non-verbal, opens eyes to voice, unable to follow commands  - Manage chronic hypernatremia (see the specific section of the plan)  - now completed UTI treatment course

## 2023-12-28 NOTE — PHYSICAL THERAPY INITIAL EVALUATION ADULT - PERTINENT HX OF CURRENT PROBLEM, REHAB EVAL
68F with PMH significant for breast cancer s/p L mastectomy, TBI (s/p a fall ~2 years ago) c/b aphasia, meningioma, hypothyroidism (hx Grave's dz s/p radioactive iodine), CVA, and dysphagia who presents to [facility] ED on 12/20/2023 with no PO intake and increased lethargy in the last 2-3 days, admitted for hypernatremia 166 and UTI. 68F with PMH significant for breast cancer s/p L mastectomy, TBI (s/p a fall ~2 years ago) c/b aphasia, meningioma, hypothyroidism (hx Grave's dz s/p radioactive iodine), CVA, and dysphagia who presents to [facility] ED on 12/20/2023 with no PO intake and increased lethargy in the last 2-3 days, admitted for hypernatremia 166 and UTI.

## 2023-12-28 NOTE — PHYSICAL THERAPY INITIAL EVALUATION ADULT - REHAB POTENTIAL, PT EVAL
none
Pt is not appropriate for PT services and is discharged from service. Resident Usha Reyes made aware

## 2023-12-28 NOTE — PHYSICAL THERAPY INITIAL EVALUATION ADULT - IMPAIRMENTS FOUND, PT EVAL
arousal, attention, and cognition/cognitive impairment/gait, locomotion, and balance/joint integrity and mobility/muscle strength
Pt is not appropriate for PT services and is discharged from service. Resident Usha Reyes made aware

## 2023-12-28 NOTE — PHYSICAL THERAPY INITIAL EVALUATION ADULT - DIAGNOSIS, PT EVAL
Pt is not appropriate for PT services and is discharged from service. Resident Usha Reyes made aware
Pt with mobility impairment due to wkness, poor mentation, joint tightness to BUE's and LE's

## 2023-12-28 NOTE — PROGRESS NOTE ADULT - NUTRITIONAL ASSESSMENT
This patient has been assessed with a concern for Malnutrition and has been determined to have a diagnosis/diagnoses of Severe protein-calorie malnutrition and Underweight (BMI < 19).    This patient is being managed with:   Diet NPO with Tube Feed-  Tube Feeding Modality: Nasogastric  Glucerna 1.2 Vidal (GLUCERNARTH)  Total Volume for 24 Hours (mL): 1320  Continuous  Starting Tube Feed Rate {mL per Hour}: 55  Increase Tube Feed Rate by (mL): 0     Every 6 hours  Until Goal Tube Feed Rate (mL per Hour): 55  Tube Feed Duration (in Hours): 24  Tube Feed Start Time: 00:00  Carlos(7 Gm Arginine/7 Gm Glut/1.2 Gm HMB     Qty per Day:  2  Entered: Dec 27 2023  8:36AM

## 2023-12-28 NOTE — SWALLOW BEDSIDE ASSESSMENT ADULT - SWALLOW EVAL: PATIENT/FAMILY GOALS STATEMENT
Per chart review, family wishes to pursue further dysphagia work-up; additionally, feeding tube is within Pt/family wishes Per chart review, family wishes to pursue further dysphagia work-up; additionally, feeding tube is within Pt/family wishes. SLP discussed current swallow profile and recommendations at length w/ Pt's  Silverio who is aware and in agreement w/ current plan.

## 2023-12-28 NOTE — PROGRESS NOTE ADULT - PROBLEM SELECTOR PLAN 2
Symptoms of lethargy/encephalopathy >48h with no PO intake, likely has chronic hypernatremia (vs acute) 2/2 severe dehydration. Na 166 on admission, improved w/ D5.   Na 150->149    - monitor BMP daily  - continue free water 250 q6h Symptoms of lethargy/encephalopathy >48h with no PO intake, likely has chronic hypernatremia (vs acute) 2/2 severe dehydration. Na 166 on admission, improved w/ D5.   Na 150    - monitor BMP daily  - continue free water 300 q4h

## 2023-12-28 NOTE — PHYSICAL THERAPY INITIAL EVALUATION ADULT - NSPTDISCHREC_GEN_A_CORE
Pt is not appropriate for PT services and is discharged from service. Resident Usha Reyes made aware/Long Term Care/SNF Pt is not appropriate for PT services and is discharged from service. Resident sUha Reyes made aware/Long Term Care/SNF

## 2023-12-28 NOTE — PHYSICAL THERAPY INITIAL EVALUATION ADULT - ADDITIONAL COMMENTS
Per chart, lives with spouse, assisted for all adl's and functional mobility. Unable to obtain accurate functional history as pt unable to provide and spouse unable to be reached.
PT spoke with -per  1 month ago pt was able to "walk to the car" and a few weeks ago was able to stand and turn to transfer to a wheelchair with assistance by .  states pt is never left alone, is either assisted by him or son. Pt requires assistance for all mobility (in wheelchair) and all ADLs. Pt has hospital bed and wheelchair.

## 2023-12-28 NOTE — PROGRESS NOTE ADULT - SUBJECTIVE AND OBJECTIVE BOX
[facility]-- WOUND TEAM -- FOLLOW UP NOTE  --------------------------------------------------------------------------------    24 hour events/subjective:    febrile  tolerating TF  incontinent  no excess drainage or odor noted      Diet:  Diet, NPO with Tube Feed:   Tube Feeding Modality: Nasogastric  Glucerna 1.2 Vidal (GLUCERVeysoft)  Total Volume for 24 Hours (mL): 1320  Continuous  Starting Tube Feed Rate mL per Hour: 55  Increase Tube Feed Rate by (mL): 0     Every 6 hours  Until Goal Tube Feed Rate (mL per Hour): 55  Tube Feed Duration (in Hours): 24  Tube Feed Start Time: 00:00  Carlos(7 Gm Arginine/7 Gm Glut/1.2 Gm HMB     Qty per Day:  2 (12-27-23 @ 11:54)      ROS: pt unable to offer    ALLERGIES & MEDICATIONS  --------------------------------------------------------------------------------  Allergies  Tapazole (Hives)        STANDING INPATIENT MEDICATIONS  ascorbic acid 500 milliGRAM(s) Oral daily  cefTRIAXone   IVPB 1000 milliGRAM(s) IV Intermittent every 24 hours  collagenase Ointment 1 Application(s) Topical daily  enoxaparin Injectable 40 milliGRAM(s) SubCutaneous every 24 hours  folic acid 1 milliGRAM(s) Oral daily  levothyroxine 112 MICROGram(s) Oral daily  multivitamin 1 Tablet(s) Oral daily  polyethylene glycol 3350 17 Gram(s) Oral daily  senna 2 Tablet(s) Oral at bedtime  sertraline 25 milliGRAM(s) Oral daily  thiamine 100 milliGRAM(s) Oral daily        VITALS/PHYSICAL EXAM  --------------------------------------------------------------------------------  T(C): 37.3 (12-28-23 @ 12:58), Max: 38.1 (12-27-23 @ 20:55)  HR: 73 (12-28-23 @ 12:58) (73 - 96)  BP: 110/65 (12-28-23 @ 12:58) (96/58 - 110/65)  RR: 18 (12-28-23 @ 12:58) (18 - 18)  SpO2: 95% (12-28-23 @ 12:58) (95% - 96%)  Wt(kg): --    NAD, lethargic, cachectic, frail,  WD/ WN/  WG  Versa Care P500  bed  HEENT:  NC/AT, sclera clear, mucosa moist, throat clear, trachea midline, neck supple  Respiratory: nonlabored w/ equal chest rise  Gastrointestinal: soft NT/ND   Neurology:  nonverbal, no follow commands, paraplegic  Psych: calm/ appropriate  Musculoskeletal: stiff pROM, w/ contractures  Vascular: BLE equally warn,  no cyanosis, clubbing, nor acute ischemia         BLE edema equal         BLE DP pulses palpable  Skin:  thin, dry, pale, frail,  ecchymosis w/o hematoma  lower buttocks w/ hyperpigmentation of incontinence   left trochanter (HIP) unstageable pressure injury  2 cm x 2 cm     soft grey  eschar  left posterior  shoulder hypo/hyperpigemented skin    resolving pressure injury    6 cm x 4  cm   Right (hip)  unstageable pressure injury  with 2 wounds closely abutting -    a grey soft lifting eschar and an area of deep maroon/ purple discolored skin    12 cm x 10  cm   Procedure Note  Using aseptic technique Rt hip wound was excisionally debrided using a scissor and forceps through necrotic dermis into subcutaneous tissue.  Pt tolerated procedure well.  Hemostasis was maintained throughout.  Debulking debridement of necrotic tissue.  Post measurements 12cm x 10cm x 0.3cm   Right buttock   unstageable pressure  injury   5 cm  x 5 cm     soft eschar and maroon /purple skin w/ epidermolysis   sacrum unstageable pressure injury    w/ lifting soft grey/black eschar w/ hyperpigmented periwound skin    8 cm x 6  cm   Procedure Note  Using aseptic technique sacral  wound was excisionally debrided using a scissor and forceps through necrotic dermis into subcutaneous tissue.  Pt tolerated procedure well.  Hemostasis was maintained throughout. Debulking debridement of necrotic tissue. Post measurements  8cm x 6cm x 0.5cm  thoracic spine there DTI   non- blanchable deep red/ maroon skin w/ epidermolysis    12cm x 3cm  no new blistering  or drainage  No odor, erythema, increased warmth, tenderness, induration, fluctuance, nor crepitus          LABS/ CULTURES/ RADIOLOGY:              10.4   7.25  >-----------<  190      [12-28-23 @ 07:02]              34.6     150  |  114  |  19  ----------------------------<  134      [12-28-23 @ 07:00]  3.9   |  28  |  0.40        Ca     7.7     [12-28-23 @ 07:00]      Mg     2.4     [12-28-23 @ 07:00]      Phos  2.6     [12-28-23 @ 07:00]    CAPILLARY BLOOD GLUCOSE  POCT Blood Glucose.: 138 mg/dL (28 Dec 2023 12:07)  POCT Blood Glucose.: 145 mg/dL (28 Dec 2023 06:02)  POCT Blood Glucose.: 155 mg/dL (28 Dec 2023 01:11)  POCT Blood Glucose.: 163 mg/dL (27 Dec 2023 19:45)      A1C with Estimated Average Glucose Result: 5.7 % (09-19-23 @ 10:33)   [facility]-- WOUND TEAM -- FOLLOW UP NOTE  --------------------------------------------------------------------------------    24 hour events/subjective:    febrile  tolerating TF  incontinent  no excess drainage or odor noted      Diet:  Diet, NPO with Tube Feed:   Tube Feeding Modality: Nasogastric  Glucerna 1.2 Vidal (GLUCERBlazeMeter)  Total Volume for 24 Hours (mL): 1320  Continuous  Starting Tube Feed Rate mL per Hour: 55  Increase Tube Feed Rate by (mL): 0     Every 6 hours  Until Goal Tube Feed Rate (mL per Hour): 55  Tube Feed Duration (in Hours): 24  Tube Feed Start Time: 00:00  Carlos(7 Gm Arginine/7 Gm Glut/1.2 Gm HMB     Qty per Day:  2 (12-27-23 @ 11:54)      ROS: pt unable to offer    ALLERGIES & MEDICATIONS  --------------------------------------------------------------------------------  Allergies  Tapazole (Hives)        STANDING INPATIENT MEDICATIONS  ascorbic acid 500 milliGRAM(s) Oral daily  cefTRIAXone   IVPB 1000 milliGRAM(s) IV Intermittent every 24 hours  collagenase Ointment 1 Application(s) Topical daily  enoxaparin Injectable 40 milliGRAM(s) SubCutaneous every 24 hours  folic acid 1 milliGRAM(s) Oral daily  levothyroxine 112 MICROGram(s) Oral daily  multivitamin 1 Tablet(s) Oral daily  polyethylene glycol 3350 17 Gram(s) Oral daily  senna 2 Tablet(s) Oral at bedtime  sertraline 25 milliGRAM(s) Oral daily  thiamine 100 milliGRAM(s) Oral daily        VITALS/PHYSICAL EXAM  --------------------------------------------------------------------------------  T(C): 37.3 (12-28-23 @ 12:58), Max: 38.1 (12-27-23 @ 20:55)  HR: 73 (12-28-23 @ 12:58) (73 - 96)  BP: 110/65 (12-28-23 @ 12:58) (96/58 - 110/65)  RR: 18 (12-28-23 @ 12:58) (18 - 18)  SpO2: 95% (12-28-23 @ 12:58) (95% - 96%)  Wt(kg): --    NAD, lethargic, cachectic, frail,  WD/ WN/  WG  Versa Care P500  bed  HEENT:  NC/AT, sclera clear, mucosa moist, throat clear, trachea midline, neck supple  Respiratory: nonlabored w/ equal chest rise  Gastrointestinal: soft NT/ND   Neurology:  nonverbal, no follow commands, paraplegic  Psych: calm/ appropriate  Musculoskeletal: stiff pROM, w/ contractures  Vascular: BLE equally warn,  no cyanosis, clubbing, nor acute ischemia         BLE edema equal         BLE DP pulses palpable  Skin:  thin, dry, pale, frail,  ecchymosis w/o hematoma  lower buttocks w/ hyperpigmentation of incontinence   left trochanter (HIP) unstageable pressure injury  2 cm x 2 cm     soft grey  eschar  left posterior  shoulder hypo/hyperpigemented skin    resolving pressure injury    6 cm x 4  cm   Right (hip)  unstageable pressure injury  with 2 wounds closely abutting -    a grey soft lifting eschar and an area of deep maroon/ purple discolored skin    12 cm x 10  cm   Procedure Note  Using aseptic technique Rt hip wound was excisionally debrided using a scissor and forceps through necrotic dermis into subcutaneous tissue.  Pt tolerated procedure well.  Hemostasis was maintained throughout.  Debulking debridement of necrotic tissue.  Post measurements 12cm x 10cm x 0.3cm   Right buttock   unstageable pressure  injury   5 cm  x 5 cm     soft eschar and maroon /purple skin w/ epidermolysis   sacrum unstageable pressure injury    w/ lifting soft grey/black eschar w/ hyperpigmented periwound skin    8 cm x 6  cm   Procedure Note  Using aseptic technique sacral  wound was excisionally debrided using a scissor and forceps through necrotic dermis into subcutaneous tissue.  Pt tolerated procedure well.  Hemostasis was maintained throughout. Debulking debridement of necrotic tissue. Post measurements  8cm x 6cm x 0.5cm  thoracic spine there DTI   non- blanchable deep red/ maroon skin w/ epidermolysis    12cm x 3cm  no new blistering  or drainage  No odor, erythema, increased warmth, tenderness, induration, fluctuance, nor crepitus          LABS/ CULTURES/ RADIOLOGY:              10.4   7.25  >-----------<  190      [12-28-23 @ 07:02]              34.6     150  |  114  |  19  ----------------------------<  134      [12-28-23 @ 07:00]  3.9   |  28  |  0.40        Ca     7.7     [12-28-23 @ 07:00]      Mg     2.4     [12-28-23 @ 07:00]      Phos  2.6     [12-28-23 @ 07:00]    CAPILLARY BLOOD GLUCOSE  POCT Blood Glucose.: 138 mg/dL (28 Dec 2023 12:07)  POCT Blood Glucose.: 145 mg/dL (28 Dec 2023 06:02)  POCT Blood Glucose.: 155 mg/dL (28 Dec 2023 01:11)  POCT Blood Glucose.: 163 mg/dL (27 Dec 2023 19:45)      A1C with Estimated Average Glucose Result: 5.7 % (09-19-23 @ 10:33)

## 2023-12-28 NOTE — PHYSICAL THERAPY INITIAL EVALUATION ADULT - CRITERIA FOR SKILLED THERAPEUTIC INTERVENTIONS
Pt is not appropriate for PT services and is discharged from service. Resident Usha Reyes made aware
impairments found

## 2023-12-28 NOTE — PHYSICAL THERAPY INITIAL EVALUATION ADULT - THERAPY FREQUENCY, PT EVAL
Pt is not appropriate for PT services and is discharged from service. Resident Usha Reyes made aware

## 2023-12-28 NOTE — PROGRESS NOTE ADULT - PROBLEM SELECTOR PLAN 8
- DVT ppx: Lovenox (40 mg QD; CrCl >30 ml/min) and SCDs  - Diet: NPO until SLP eval  - Dispo: pending clinical course - DVT ppx: Lovenox (40 mg QD; CrCl >30 ml/min) and SCDs  - Diet: NG tube   - Dispo: pending clinical course

## 2023-12-28 NOTE — PHYSICAL THERAPY INITIAL EVALUATION ADULT - FUNCTIONAL LIMITATIONS, PT EVAL
self-care
Pt is not appropriate for PT services and is discharged from service. Resident Usha Reyes made aware

## 2023-12-28 NOTE — SWALLOW BEDSIDE ASSESSMENT ADULT - SLP PERTINENT HISTORY OF CURRENT PROBLEM
68y F w/ PMHx of TBI s/p fall (residual aphasia, L-sided weakness, and dysphagia at baseline), CVA, meningioma, breast CA, and hypothyroidism presents w/ AMS and poor PO intake. Found to have significant hypernatremia to 166 (checking q6h), mild hypotension (90/60) positive UA with borderline leukocytosis with PMN predominance. Pending culture results. Physical exam notable for encephalopathy AOx0 and non-verbal, cachexia and b/l upper/lower ext contracture, and multiple pressure ulcers of varying stages (I-III). Per palliative care consult, family wishes to pursue further dysphagia work-up. Low-grade fever noted 12/28.

## 2023-12-28 NOTE — PHYSICAL THERAPY INITIAL EVALUATION ADULT - RANGE OF MOTION EXAMINATION, REHAB EVAL
no active ROM noted, PROM limited due to contractures, pt grimacing in pain during PROM to extremities & with dependent rolling

## 2023-12-28 NOTE — PHYSICAL THERAPY INITIAL EVALUATION ADULT - GENERAL OBSERVATIONS, REHAB EVAL
Rec'd pt R semisidelying in bed, eyes open but none responsive other than to pain from ROM by tensing
Pt received semi-supine in bed in NAD, +NGT, +purewick, +pulse ox, A&Ox0, nonverbal, not following commands, eyes open but unable to track.

## 2023-12-28 NOTE — PHYSICAL THERAPY INITIAL EVALUATION ADULT - FOLLOWS COMMANDS/ANSWERS QUESTIONS, REHAB EVAL
unable to follow commands/unable to answer questions/aphasia
does not follow commands/unable to follow commands

## 2023-12-28 NOTE — PROGRESS NOTE ADULT - ATTENDING COMMENTS
Metabolic encephalopathy in setting of hypernatremia with UTI- requiring NGT for fluid and nutrition   Hypernatremia- increase free water via NGT  UTI- s/p 3 days ceftriaxone completed 12/24  Febrile overnight- pancultured, check RVP- low threshold to start broad spectrum coverage given multiple potential sources (skin, urine, aspiration)   Dysphagia of unclear etiology with severe malnutrition-  requesting PEG evaluation- continue tube feeds via NGT  Functional quadriplegia with multiple pressure ulcers- wound care following   Hypothyroidism- Continue levothyroxine IV 56 mcg QD)  Palliative care consult appreciated- spouse requesting further evaluation for reversible causes of current conditions

## 2023-12-28 NOTE — PROGRESS NOTE ADULT - ASSESSMENT
68F with PMH significant for breast cancer s/p L mastectomy, TBI (s/p a fall ~2 years ago) c/b aphasia, meningioma, hypothyroidism (hx Grave's dz s/p radioactive iodine), CVA, and dysphagia who presents to [facility] ED on 12/20/2023 with no PO intake and increased lethargy in the last 2-3 days. Found to have significant hypernatremia to 166 (checking q6h), positive UA with borderline leukocytosis with PMN predominance. Pending culture results. RVP and CTH negative. Afebrile, on RA, hypotensive to 90/60 s/p 1L IVF bolus. Physical exam notable for encephalopathy AOx0 and non-verbal, cachexia and b/l upper/lower ext contracture, and multiple pressure ulcers of varying stages (I-III).      Wound Consult requested to assist w/ management of Hip & Spine DTI  Sacral/ Buttocks /Bilateral hip Unstageable pressure injury  Lt Shoulder DTI  Incontinence Associated Dermatitis      Sacrum - CAVILON + Medihoney dressing QD  Rt Hip- CAVILON (espcially to DTI in wound) w/ medihoney on open wound QD  Buttocks TRIAD BID and prn soiling        Continue w/ attends under pads and Pericare as per protocol  Lt shoulder- Allevyn QD  Abx per Medicine  Ongoing GOC, appreciate Palliative Consult  Moisturize intact skin w/ SWEEN cream BID  Nutrition optimization Severe protein-calorie malnutrition       high quality protein TF, mor/ prosource, MVI & Vit C to promote wound healing       Appreciate RD & S&S input  Continue turning and positioning w/ offloading assistive devices as per protocol  Waffle Cushion to chair when oob to chair  Continue w/ low air loss pressure redistribution bed surface   Pt will need Group 2 mattress on hospital bed and ROHO cushion for wheel chair upon discharge home  Care as per medicine, will follow w/ you  Upon discharge f/u as outpatient at [facility] [phone]  D/w team & RN & attng  Thank you for this consult  Codi Thompson PA-C CWS [id number]  Nights/ Weekends/ Holidays please call:  General Surgery Consult pager [phone]) for emergencies  Wound PT for multilayer leg wrapping or VAC issues (x [phone])   I spent 50minutes face to face w/ this pt of which more than 50% of the time was spent counseling & coordinating care of this pt.        68F with PMH significant for breast cancer s/p L mastectomy, TBI (s/p a fall ~2 years ago) c/b aphasia, meningioma, hypothyroidism (hx Grave's dz s/p radioactive iodine), CVA, and dysphagia who presents to [facility] ED on 12/20/2023 with no PO intake and increased lethargy in the last 2-3 days. Found to have significant hypernatremia to 166 (checking q6h), positive UA with borderline leukocytosis with PMN predominance. Pending culture results. RVP and CTH negative. Afebrile, on RA, hypotensive to 90/60 s/p 1L IVF bolus. Physical exam notable for encephalopathy AOx0 and non-verbal, cachexia and b/l upper/lower ext contracture, and multiple pressure ulcers of varying stages (I-III).      Wound Consult requested to assist w/ management of Hip & Spine DTI  Sacral/ Buttocks /Bilateral hip Unstageable pressure injury  Lt Shoulder DTI  Incontinence Associated Dermatitis      Sacrum - CAVILON + Medihoney dressing QD  Rt Hip- CAVILON (espcially to DTI in wound) w/ medihoney on open wound QD  Buttocks TRIAD BID and prn soiling        Continue w/ attends under pads and Pericare as per protocol  Lt shoulder- Allevyn QD  Abx per Medicine  Ongoing GOC, appreciate Palliative Consult  Moisturize intact skin w/ SWEEN cream BID  Nutrition optimization Severe protein-calorie malnutrition       high quality protein TF, mor/ prosource, MVI & Vit C to promote wound healing       Appreciate RD & S&S input  Continue turning and positioning w/ offloading assistive devices as per protocol  Waffle Cushion to chair when oob to chair  Continue w/ low air loss pressure redistribution bed surface   Pt will need Group 2 mattress on hospital bed and ROHO cushion for wheel chair upon discharge home  Care as per medicine, will follow w/ you  Upon discharge f/u as outpatient at Wound Center [facility] [phone]  D/w team & RN & attng  Thank you for this consult  Codi Thompson PA-C CWS [id number]  Nights/ Weekends/ Holidays please call:  General Surgery Consult pager ([phone]) for emergencies  Wound PT for multilayer leg wrapping or VAC issues ([phone])   I spent 50minutes face to face w/ this pt of which more than 50% of the time was spent counseling & coordinating care of this pt.

## 2023-12-29 DIAGNOSIS — J96.01 ACUTE RESPIRATORY FAILURE WITH HYPOXIA: ICD-10-CM

## 2023-12-29 LAB
ALBUMIN SERPL ELPH-MCNC: 2.1 G/DL — LOW (ref 3.3–5)
ALBUMIN SERPL ELPH-MCNC: 2.1 G/DL — LOW (ref 3.3–5)
ALP SERPL-CCNC: 60 U/L — SIGNIFICANT CHANGE UP (ref 40–120)
ALP SERPL-CCNC: 60 U/L — SIGNIFICANT CHANGE UP (ref 40–120)
ALT FLD-CCNC: 26 U/L — SIGNIFICANT CHANGE UP (ref 10–45)
ALT FLD-CCNC: 26 U/L — SIGNIFICANT CHANGE UP (ref 10–45)
ANION GAP SERPL CALC-SCNC: 10 MMOL/L — SIGNIFICANT CHANGE UP (ref 5–17)
ANION GAP SERPL CALC-SCNC: 10 MMOL/L — SIGNIFICANT CHANGE UP (ref 5–17)
ANION GAP SERPL CALC-SCNC: 7 MMOL/L — SIGNIFICANT CHANGE UP (ref 5–17)
ANION GAP SERPL CALC-SCNC: 7 MMOL/L — SIGNIFICANT CHANGE UP (ref 5–17)
AST SERPL-CCNC: 23 U/L — SIGNIFICANT CHANGE UP (ref 10–40)
AST SERPL-CCNC: 23 U/L — SIGNIFICANT CHANGE UP (ref 10–40)
BASE EXCESS BLDA CALC-SCNC: 4.4 MMOL/L — HIGH (ref -2–3)
BASE EXCESS BLDA CALC-SCNC: 4.4 MMOL/L — HIGH (ref -2–3)
BASOPHILS # BLD AUTO: 0.01 K/UL — SIGNIFICANT CHANGE UP (ref 0–0.2)
BASOPHILS # BLD AUTO: 0.01 K/UL — SIGNIFICANT CHANGE UP (ref 0–0.2)
BASOPHILS NFR BLD AUTO: 0.1 % — SIGNIFICANT CHANGE UP (ref 0–2)
BASOPHILS NFR BLD AUTO: 0.1 % — SIGNIFICANT CHANGE UP (ref 0–2)
BILIRUB SERPL-MCNC: 0.5 MG/DL — SIGNIFICANT CHANGE UP (ref 0.2–1.2)
BILIRUB SERPL-MCNC: 0.5 MG/DL — SIGNIFICANT CHANGE UP (ref 0.2–1.2)
BUN SERPL-MCNC: 13 MG/DL — SIGNIFICANT CHANGE UP (ref 7–23)
BUN SERPL-MCNC: 13 MG/DL — SIGNIFICANT CHANGE UP (ref 7–23)
BUN SERPL-MCNC: 14 MG/DL — SIGNIFICANT CHANGE UP (ref 7–23)
BUN SERPL-MCNC: 14 MG/DL — SIGNIFICANT CHANGE UP (ref 7–23)
CALCIUM SERPL-MCNC: 7.6 MG/DL — LOW (ref 8.4–10.5)
CALCIUM SERPL-MCNC: 7.6 MG/DL — LOW (ref 8.4–10.5)
CALCIUM SERPL-MCNC: 7.9 MG/DL — LOW (ref 8.4–10.5)
CALCIUM SERPL-MCNC: 7.9 MG/DL — LOW (ref 8.4–10.5)
CHLORIDE SERPL-SCNC: 108 MMOL/L — SIGNIFICANT CHANGE UP (ref 96–108)
CHLORIDE SERPL-SCNC: 108 MMOL/L — SIGNIFICANT CHANGE UP (ref 96–108)
CHLORIDE SERPL-SCNC: 109 MMOL/L — HIGH (ref 96–108)
CHLORIDE SERPL-SCNC: 109 MMOL/L — HIGH (ref 96–108)
CO2 BLDA-SCNC: 27 MMOL/L — HIGH (ref 19–24)
CO2 BLDA-SCNC: 27 MMOL/L — HIGH (ref 19–24)
CO2 SERPL-SCNC: 24 MMOL/L — SIGNIFICANT CHANGE UP (ref 22–31)
CO2 SERPL-SCNC: 24 MMOL/L — SIGNIFICANT CHANGE UP (ref 22–31)
CO2 SERPL-SCNC: 27 MMOL/L — SIGNIFICANT CHANGE UP (ref 22–31)
CO2 SERPL-SCNC: 27 MMOL/L — SIGNIFICANT CHANGE UP (ref 22–31)
CREAT SERPL-MCNC: 0.41 MG/DL — LOW (ref 0.5–1.3)
CREAT SERPL-MCNC: 0.41 MG/DL — LOW (ref 0.5–1.3)
CREAT SERPL-MCNC: 0.42 MG/DL — LOW (ref 0.5–1.3)
CREAT SERPL-MCNC: 0.42 MG/DL — LOW (ref 0.5–1.3)
CULTURE RESULTS: NO GROWTH — SIGNIFICANT CHANGE UP
CULTURE RESULTS: NO GROWTH — SIGNIFICANT CHANGE UP
EGFR: 105 ML/MIN/1.73M2 — SIGNIFICANT CHANGE UP
EGFR: 105 ML/MIN/1.73M2 — SIGNIFICANT CHANGE UP
EGFR: 106 ML/MIN/1.73M2 — SIGNIFICANT CHANGE UP
EGFR: 106 ML/MIN/1.73M2 — SIGNIFICANT CHANGE UP
EOSINOPHIL # BLD AUTO: 0.04 K/UL — SIGNIFICANT CHANGE UP (ref 0–0.5)
EOSINOPHIL # BLD AUTO: 0.04 K/UL — SIGNIFICANT CHANGE UP (ref 0–0.5)
EOSINOPHIL NFR BLD AUTO: 0.6 % — SIGNIFICANT CHANGE UP (ref 0–6)
EOSINOPHIL NFR BLD AUTO: 0.6 % — SIGNIFICANT CHANGE UP (ref 0–6)
GAS PNL BLDA: SIGNIFICANT CHANGE UP
GAS PNL BLDA: SIGNIFICANT CHANGE UP
GLUCOSE BLDC GLUCOMTR-MCNC: 107 MG/DL — HIGH (ref 70–99)
GLUCOSE BLDC GLUCOMTR-MCNC: 107 MG/DL — HIGH (ref 70–99)
GLUCOSE BLDC GLUCOMTR-MCNC: 120 MG/DL — HIGH (ref 70–99)
GLUCOSE BLDC GLUCOMTR-MCNC: 120 MG/DL — HIGH (ref 70–99)
GLUCOSE BLDC GLUCOMTR-MCNC: 124 MG/DL — HIGH (ref 70–99)
GLUCOSE BLDC GLUCOMTR-MCNC: 124 MG/DL — HIGH (ref 70–99)
GLUCOSE BLDC GLUCOMTR-MCNC: 136 MG/DL — HIGH (ref 70–99)
GLUCOSE BLDC GLUCOMTR-MCNC: 136 MG/DL — HIGH (ref 70–99)
GLUCOSE BLDC GLUCOMTR-MCNC: 142 MG/DL — HIGH (ref 70–99)
GLUCOSE BLDC GLUCOMTR-MCNC: 142 MG/DL — HIGH (ref 70–99)
GLUCOSE BLDC GLUCOMTR-MCNC: 147 MG/DL — HIGH (ref 70–99)
GLUCOSE BLDC GLUCOMTR-MCNC: 147 MG/DL — HIGH (ref 70–99)
GLUCOSE BLDC GLUCOMTR-MCNC: 149 MG/DL — HIGH (ref 70–99)
GLUCOSE BLDC GLUCOMTR-MCNC: 149 MG/DL — HIGH (ref 70–99)
GLUCOSE SERPL-MCNC: 122 MG/DL — HIGH (ref 70–99)
GLUCOSE SERPL-MCNC: 122 MG/DL — HIGH (ref 70–99)
GLUCOSE SERPL-MCNC: 142 MG/DL — HIGH (ref 70–99)
GLUCOSE SERPL-MCNC: 142 MG/DL — HIGH (ref 70–99)
HCO3 BLDA-SCNC: 26 MMOL/L — SIGNIFICANT CHANGE UP (ref 21–28)
HCO3 BLDA-SCNC: 26 MMOL/L — SIGNIFICANT CHANGE UP (ref 21–28)
HCT VFR BLD CALC: 32.6 % — LOW (ref 34.5–45)
HCT VFR BLD CALC: 32.6 % — LOW (ref 34.5–45)
HCT VFR BLD CALC: 37.2 % — SIGNIFICANT CHANGE UP (ref 34.5–45)
HCT VFR BLD CALC: 37.2 % — SIGNIFICANT CHANGE UP (ref 34.5–45)
HGB BLD-MCNC: 10.2 G/DL — LOW (ref 11.5–15.5)
HGB BLD-MCNC: 10.2 G/DL — LOW (ref 11.5–15.5)
HGB BLD-MCNC: 11.2 G/DL — LOW (ref 11.5–15.5)
HGB BLD-MCNC: 11.2 G/DL — LOW (ref 11.5–15.5)
IMM GRANULOCYTES NFR BLD AUTO: 0.4 % — SIGNIFICANT CHANGE UP (ref 0–0.9)
IMM GRANULOCYTES NFR BLD AUTO: 0.4 % — SIGNIFICANT CHANGE UP (ref 0–0.9)
LACTATE BLDA-MCNC: 1 MMOL/L — SIGNIFICANT CHANGE UP (ref 0.5–2)
LACTATE BLDA-MCNC: 1 MMOL/L — SIGNIFICANT CHANGE UP (ref 0.5–2)
LYMPHOCYTES # BLD AUTO: 1.33 K/UL — SIGNIFICANT CHANGE UP (ref 1–3.3)
LYMPHOCYTES # BLD AUTO: 1.33 K/UL — SIGNIFICANT CHANGE UP (ref 1–3.3)
LYMPHOCYTES # BLD AUTO: 18.5 % — SIGNIFICANT CHANGE UP (ref 13–44)
LYMPHOCYTES # BLD AUTO: 18.5 % — SIGNIFICANT CHANGE UP (ref 13–44)
MAGNESIUM SERPL-MCNC: 2.3 MG/DL — SIGNIFICANT CHANGE UP (ref 1.6–2.6)
MAGNESIUM SERPL-MCNC: 2.3 MG/DL — SIGNIFICANT CHANGE UP (ref 1.6–2.6)
MAGNESIUM SERPL-MCNC: 2.4 MG/DL — SIGNIFICANT CHANGE UP (ref 1.6–2.6)
MAGNESIUM SERPL-MCNC: 2.4 MG/DL — SIGNIFICANT CHANGE UP (ref 1.6–2.6)
MCHC RBC-ENTMCNC: 26.4 PG — LOW (ref 27–34)
MCHC RBC-ENTMCNC: 26.4 PG — LOW (ref 27–34)
MCHC RBC-ENTMCNC: 26.8 PG — LOW (ref 27–34)
MCHC RBC-ENTMCNC: 26.8 PG — LOW (ref 27–34)
MCHC RBC-ENTMCNC: 30.1 GM/DL — LOW (ref 32–36)
MCHC RBC-ENTMCNC: 30.1 GM/DL — LOW (ref 32–36)
MCHC RBC-ENTMCNC: 31.3 GM/DL — LOW (ref 32–36)
MCHC RBC-ENTMCNC: 31.3 GM/DL — LOW (ref 32–36)
MCV RBC AUTO: 85.6 FL — SIGNIFICANT CHANGE UP (ref 80–100)
MCV RBC AUTO: 85.6 FL — SIGNIFICANT CHANGE UP (ref 80–100)
MCV RBC AUTO: 87.5 FL — SIGNIFICANT CHANGE UP (ref 80–100)
MCV RBC AUTO: 87.5 FL — SIGNIFICANT CHANGE UP (ref 80–100)
MONOCYTES # BLD AUTO: 0.39 K/UL — SIGNIFICANT CHANGE UP (ref 0–0.9)
MONOCYTES # BLD AUTO: 0.39 K/UL — SIGNIFICANT CHANGE UP (ref 0–0.9)
MONOCYTES NFR BLD AUTO: 5.4 % — SIGNIFICANT CHANGE UP (ref 2–14)
MONOCYTES NFR BLD AUTO: 5.4 % — SIGNIFICANT CHANGE UP (ref 2–14)
MRSA PCR RESULT.: SIGNIFICANT CHANGE UP
MRSA PCR RESULT.: SIGNIFICANT CHANGE UP
NEUTROPHILS # BLD AUTO: 5.4 K/UL — SIGNIFICANT CHANGE UP (ref 1.8–7.4)
NEUTROPHILS # BLD AUTO: 5.4 K/UL — SIGNIFICANT CHANGE UP (ref 1.8–7.4)
NEUTROPHILS NFR BLD AUTO: 75 % — SIGNIFICANT CHANGE UP (ref 43–77)
NEUTROPHILS NFR BLD AUTO: 75 % — SIGNIFICANT CHANGE UP (ref 43–77)
NRBC # BLD: 0 /100 WBCS — SIGNIFICANT CHANGE UP (ref 0–0)
PCO2 BLDA: 30 MMHG — LOW (ref 32–45)
PCO2 BLDA: 30 MMHG — LOW (ref 32–45)
PH BLDA: 7.55 — HIGH (ref 7.35–7.45)
PH BLDA: 7.55 — HIGH (ref 7.35–7.45)
PHOSPHATE SERPL-MCNC: 2.4 MG/DL — LOW (ref 2.5–4.5)
PHOSPHATE SERPL-MCNC: 2.4 MG/DL — LOW (ref 2.5–4.5)
PHOSPHATE SERPL-MCNC: 2.5 MG/DL — SIGNIFICANT CHANGE UP (ref 2.5–4.5)
PHOSPHATE SERPL-MCNC: 2.5 MG/DL — SIGNIFICANT CHANGE UP (ref 2.5–4.5)
PLATELET # BLD AUTO: 196 K/UL — SIGNIFICANT CHANGE UP (ref 150–400)
PLATELET # BLD AUTO: 196 K/UL — SIGNIFICANT CHANGE UP (ref 150–400)
PLATELET # BLD AUTO: 228 K/UL — SIGNIFICANT CHANGE UP (ref 150–400)
PLATELET # BLD AUTO: 228 K/UL — SIGNIFICANT CHANGE UP (ref 150–400)
PO2 BLDA: 58 MMHG — LOW (ref 83–108)
PO2 BLDA: 58 MMHG — LOW (ref 83–108)
POTASSIUM SERPL-MCNC: 3.8 MMOL/L — SIGNIFICANT CHANGE UP (ref 3.5–5.3)
POTASSIUM SERPL-MCNC: 3.8 MMOL/L — SIGNIFICANT CHANGE UP (ref 3.5–5.3)
POTASSIUM SERPL-MCNC: 4.1 MMOL/L — SIGNIFICANT CHANGE UP (ref 3.5–5.3)
POTASSIUM SERPL-MCNC: 4.1 MMOL/L — SIGNIFICANT CHANGE UP (ref 3.5–5.3)
POTASSIUM SERPL-SCNC: 3.8 MMOL/L — SIGNIFICANT CHANGE UP (ref 3.5–5.3)
POTASSIUM SERPL-SCNC: 3.8 MMOL/L — SIGNIFICANT CHANGE UP (ref 3.5–5.3)
POTASSIUM SERPL-SCNC: 4.1 MMOL/L — SIGNIFICANT CHANGE UP (ref 3.5–5.3)
POTASSIUM SERPL-SCNC: 4.1 MMOL/L — SIGNIFICANT CHANGE UP (ref 3.5–5.3)
PROT SERPL-MCNC: 5.4 G/DL — LOW (ref 6–8.3)
PROT SERPL-MCNC: 5.4 G/DL — LOW (ref 6–8.3)
RAPID RVP RESULT: SIGNIFICANT CHANGE UP
RAPID RVP RESULT: SIGNIFICANT CHANGE UP
RBC # BLD: 3.81 M/UL — SIGNIFICANT CHANGE UP (ref 3.8–5.2)
RBC # BLD: 3.81 M/UL — SIGNIFICANT CHANGE UP (ref 3.8–5.2)
RBC # BLD: 4.25 M/UL — SIGNIFICANT CHANGE UP (ref 3.8–5.2)
RBC # BLD: 4.25 M/UL — SIGNIFICANT CHANGE UP (ref 3.8–5.2)
RBC # FLD: 15.9 % — HIGH (ref 10.3–14.5)
S AUREUS DNA NOSE QL NAA+PROBE: DETECTED
S AUREUS DNA NOSE QL NAA+PROBE: DETECTED
SAO2 % BLDA: 94.1 % — SIGNIFICANT CHANGE UP (ref 94–98)
SAO2 % BLDA: 94.1 % — SIGNIFICANT CHANGE UP (ref 94–98)
SARS-COV-2 RNA SPEC QL NAA+PROBE: SIGNIFICANT CHANGE UP
SARS-COV-2 RNA SPEC QL NAA+PROBE: SIGNIFICANT CHANGE UP
SODIUM SERPL-SCNC: 142 MMOL/L — SIGNIFICANT CHANGE UP (ref 135–145)
SODIUM SERPL-SCNC: 142 MMOL/L — SIGNIFICANT CHANGE UP (ref 135–145)
SODIUM SERPL-SCNC: 143 MMOL/L — SIGNIFICANT CHANGE UP (ref 135–145)
SODIUM SERPL-SCNC: 143 MMOL/L — SIGNIFICANT CHANGE UP (ref 135–145)
SPECIMEN SOURCE: SIGNIFICANT CHANGE UP
SPECIMEN SOURCE: SIGNIFICANT CHANGE UP
WBC # BLD: 5.13 K/UL — SIGNIFICANT CHANGE UP (ref 3.8–10.5)
WBC # BLD: 5.13 K/UL — SIGNIFICANT CHANGE UP (ref 3.8–10.5)
WBC # BLD: 7.2 K/UL — SIGNIFICANT CHANGE UP (ref 3.8–10.5)
WBC # BLD: 7.2 K/UL — SIGNIFICANT CHANGE UP (ref 3.8–10.5)
WBC # FLD AUTO: 5.13 K/UL — SIGNIFICANT CHANGE UP (ref 3.8–10.5)
WBC # FLD AUTO: 5.13 K/UL — SIGNIFICANT CHANGE UP (ref 3.8–10.5)
WBC # FLD AUTO: 7.2 K/UL — SIGNIFICANT CHANGE UP (ref 3.8–10.5)
WBC # FLD AUTO: 7.2 K/UL — SIGNIFICANT CHANGE UP (ref 3.8–10.5)

## 2023-12-29 PROCEDURE — 99233 SBSQ HOSP IP/OBS HIGH 50: CPT | Mod: GC

## 2023-12-29 PROCEDURE — 71045 X-RAY EXAM CHEST 1 VIEW: CPT | Mod: 26

## 2023-12-29 RX ORDER — PIPERACILLIN AND TAZOBACTAM 4; .5 G/20ML; G/20ML
3.38 INJECTION, POWDER, LYOPHILIZED, FOR SOLUTION INTRAVENOUS EVERY 12 HOURS
Refills: 0 | Status: COMPLETED | OUTPATIENT
Start: 2023-12-29 | End: 2024-01-02

## 2023-12-29 RX ORDER — SODIUM CHLORIDE 9 MG/ML
3 INJECTION INTRAMUSCULAR; INTRAVENOUS; SUBCUTANEOUS ONCE
Refills: 0 | Status: COMPLETED | OUTPATIENT
Start: 2023-12-29 | End: 2023-12-29

## 2023-12-29 RX ORDER — SODIUM CHLORIDE 9 MG/ML
1000 INJECTION, SOLUTION INTRAVENOUS
Refills: 0 | Status: DISCONTINUED | OUTPATIENT
Start: 2023-12-29 | End: 2023-12-30

## 2023-12-29 RX ORDER — ALBUMIN HUMAN 25 %
250 VIAL (ML) INTRAVENOUS ONCE
Refills: 0 | Status: COMPLETED | OUTPATIENT
Start: 2023-12-29 | End: 2023-12-29

## 2023-12-29 RX ORDER — SODIUM CHLORIDE 9 MG/ML
500 INJECTION, SOLUTION INTRAVENOUS ONCE
Refills: 0 | Status: COMPLETED | OUTPATIENT
Start: 2023-12-29 | End: 2023-12-29

## 2023-12-29 RX ORDER — FLUTICASONE PROPIONATE 50 MCG
1 SPRAY, SUSPENSION NASAL ONCE
Refills: 0 | Status: COMPLETED | OUTPATIENT
Start: 2023-12-29 | End: 2023-12-29

## 2023-12-29 RX ORDER — IPRATROPIUM/ALBUTEROL SULFATE 18-103MCG
3 AEROSOL WITH ADAPTER (GRAM) INHALATION ONCE
Refills: 0 | Status: COMPLETED | OUTPATIENT
Start: 2023-12-29 | End: 2023-12-29

## 2023-12-29 RX ORDER — LEVOTHYROXINE SODIUM 125 MCG
125 TABLET ORAL AT BEDTIME
Refills: 0 | Status: DISCONTINUED | OUTPATIENT
Start: 2023-12-29 | End: 2023-12-29

## 2023-12-29 RX ORDER — SODIUM CHLORIDE 9 MG/ML
4 INJECTION INTRAMUSCULAR; INTRAVENOUS; SUBCUTANEOUS ONCE
Refills: 0 | Status: COMPLETED | OUTPATIENT
Start: 2023-12-29 | End: 2023-12-29

## 2023-12-29 RX ORDER — LEVOTHYROXINE SODIUM 125 MCG
112 TABLET ORAL AT BEDTIME
Refills: 0 | Status: DISCONTINUED | OUTPATIENT
Start: 2023-12-29 | End: 2023-12-29

## 2023-12-29 RX ORDER — PIPERACILLIN AND TAZOBACTAM 4; .5 G/20ML; G/20ML
3.38 INJECTION, POWDER, LYOPHILIZED, FOR SOLUTION INTRAVENOUS ONCE
Refills: 0 | Status: COMPLETED | OUTPATIENT
Start: 2023-12-29 | End: 2023-12-29

## 2023-12-29 RX ORDER — SODIUM CHLORIDE 9 MG/ML
500 INJECTION INTRAMUSCULAR; INTRAVENOUS; SUBCUTANEOUS ONCE
Refills: 0 | Status: COMPLETED | OUTPATIENT
Start: 2023-12-29 | End: 2023-12-29

## 2023-12-29 RX ORDER — VANCOMYCIN HCL 1 G
500 VIAL (EA) INTRAVENOUS ONCE
Refills: 0 | Status: COMPLETED | OUTPATIENT
Start: 2023-12-29 | End: 2023-12-29

## 2023-12-29 RX ORDER — LEVOTHYROXINE SODIUM 125 MCG
56 TABLET ORAL AT BEDTIME
Refills: 0 | Status: DISCONTINUED | OUTPATIENT
Start: 2023-12-29 | End: 2024-01-16

## 2023-12-29 RX ADMIN — SODIUM CHLORIDE 4 MILLILITER(S): 9 INJECTION INTRAMUSCULAR; INTRAVENOUS; SUBCUTANEOUS at 03:10

## 2023-12-29 RX ADMIN — Medication 3 MILLILITER(S): at 06:25

## 2023-12-29 RX ADMIN — SODIUM CHLORIDE 50 MILLILITER(S): 9 INJECTION, SOLUTION INTRAVENOUS at 11:14

## 2023-12-29 RX ADMIN — SODIUM CHLORIDE 500 MILLILITER(S): 9 INJECTION, SOLUTION INTRAVENOUS at 09:45

## 2023-12-29 RX ADMIN — Medication 100 MILLIGRAM(S): at 03:00

## 2023-12-29 RX ADMIN — SODIUM CHLORIDE 3 MILLILITER(S): 9 INJECTION INTRAMUSCULAR; INTRAVENOUS; SUBCUTANEOUS at 06:08

## 2023-12-29 RX ADMIN — PIPERACILLIN AND TAZOBACTAM 25 GRAM(S): 4; .5 INJECTION, POWDER, LYOPHILIZED, FOR SOLUTION INTRAVENOUS at 06:09

## 2023-12-29 RX ADMIN — PIPERACILLIN AND TAZOBACTAM 25 GRAM(S): 4; .5 INJECTION, POWDER, LYOPHILIZED, FOR SOLUTION INTRAVENOUS at 17:04

## 2023-12-29 RX ADMIN — SODIUM CHLORIDE 500 MILLILITER(S): 9 INJECTION INTRAMUSCULAR; INTRAVENOUS; SUBCUTANEOUS at 06:20

## 2023-12-29 RX ADMIN — Medication 125 MILLILITER(S): at 07:37

## 2023-12-29 RX ADMIN — Medication 1 APPLICATION(S): at 11:18

## 2023-12-29 RX ADMIN — PIPERACILLIN AND TAZOBACTAM 200 GRAM(S): 4; .5 INJECTION, POWDER, LYOPHILIZED, FOR SOLUTION INTRAVENOUS at 03:00

## 2023-12-29 RX ADMIN — Medication 3 MILLILITER(S): at 03:09

## 2023-12-29 RX ADMIN — Medication 1 SPRAY(S): at 06:08

## 2023-12-29 NOTE — RAPID RESPONSE TEAM SUMMARY - NSADDTLFINDINGSRRT_GEN_ALL_CORE
- AHRF likely 2/2 to Aspiration and Hypotension likely 2/2 sepsis   - NGT noted to be dislodged during RRT

## 2023-12-29 NOTE — RAPID RESPONSE TEAM SUMMARY - NSOTHERINTERVENTIONSRRT_GEN_ALL_CORE
- Would hold Tube feeds  - Continue HFNC and wean as tolerated once respiratory status improves - Would hold Tube feeds for now  - F/U RVP, labs, and infectious workup  - Continue HFNC and wean as tolerated once respiratory status improves

## 2023-12-29 NOTE — PROGRESS NOTE ADULT - PROBLEM SELECTOR PLAN 8
- DVT ppx: Lovenox (40 mg QD; CrCl >30 ml/min) and SCDs  - Diet: NG tube   - Dispo: pending clinical course Hx of Grave's disease s/p radioactive iodine  - resumed home levothyroxine 112mcg via NG tube

## 2023-12-29 NOTE — RAPID RESPONSE TEAM SUMMARY - NSSITUATIONBACKGROUNDRRT_GEN_ALL_CORE
68F with PMH significant for breast cancer s/p L mastectomy, TBI (s/p a fall ~2 years ago) c/b aphasia, meningioma, hypothyroidism (hx Grave's dz s/p radioactive iodine), CVA, and dysphagia who presents to [facility] ED on 12/20/2023 with no PO intake and increased lethargy, admitted for hypernatremia 166 and UTI. Course complicated by progressive encephalopathy and severe malnutrition. RRT called for hypotension. Patient had just finished receiving chest PT. Noted to be Hypotensive SBP 80's. Already receiving NS bolus 500cc. Repeat 's on RLE and 90's on RUE with smaller cuff. Patient mental status at baseline. Pressure bagged IVF bolus and ordered albumin 250 due to low albumin on CMP.  68F with PMH significant for breast cancer s/p L mastectomy, TBI (s/p a fall ~2 years ago) c/b aphasia, meningioma, hypothyroidism (hx Grave's dz s/p radioactive iodine), CVA, and dysphagia who presents to [facility] ED on 12/20/2023 with no PO intake and increased lethargy, admitted for hypernatremia 166 and UTI. Course complicated by progressive encephalopathy and severe malnutrition. RRT called for hypotension. Patient had just finished receiving chest PT. Noted to be Hypotensive SBP 80's. Already receiving NS bolus 500cc. Repeat 's on RLE and 90's on RUE with smaller cuff. Patient mental status at baseline. Pressure bagged IVF bolus and ordered albumin 250 due to low albumin on CMP.  68F with PMH significant for breast cancer s/p L mastectomy, TBI (s/p a fall ~2 years ago) c/b aphasia, meningioma, hypothyroidism (hx Grave's dz s/p radioactive iodine), CVA, and dysphagia who presents to [facility] ED on 12/20/2023 with no PO intake and increased lethargy, admitted for hypernatremia 166 and UTI. Course complicated by progressive encephalopathy and severe malnutrition. RRT called for hypotension. Patient had just finished receiving chest PT. Noted to be Hypotensive SBP 80's. O2 94% and HR WNL. Already receiving NS bolus 500cc. Repeat 's on RLE and 90's on RUE with smaller cuff. Patient mental status at baseline. Pressure bagged IVF bolus and ordered albumin 250 due to low albumin on CMP.  68F with PMH significant for breast cancer s/p L mastectomy, TBI (s/p a fall ~2 years ago) c/b aphasia, meningioma, hypothyroidism (hx Grave's dz s/p radioactive iodine), CVA, and dysphagia who presents to [facility] ED on 12/20/2023 with no PO intake and increased lethargy, admitted for hypernatremia 166 and UTI. Course complicated by progressive encephalopathy and severe malnutrition. RRT called for hypotension. Patient had just finished receiving chest PT. Noted to be Hypotensive SBP 80's. O2 94% and HR WNL. Already receiving NS bolus 500cc. Repeat 's on RLE and 90's on RUE with smaller cuff. Patient mental status at baseline. Pressure bagged IVF bolus and ordered albumin 250 due to low albumin on CMP.

## 2023-12-29 NOTE — PROGRESS NOTE ADULT - ATTENDING COMMENTS
Metabolic encephalopathy in setting of hypernatremia with UTI- required NGT for fluid and nutrition   Acute hypoxic respiratory failure- suspect aspiration- NGT feedings stopped and tube removed- started on Zosyn  Continue high flow O2- wean as tolerated  Dysphagia of unclear etiology with severe malnutrition-  previously requested PEG evaluation  Functional quadriplegia with multiple pressure ulcers- wound care following   Hypothyroidism- Continue levothyroxine IV 56 mcg QD  Palliative care consulted- spouse requested further evaluation and treatement for reversible causes of current conditions  Prognosis guarded- remains full code

## 2023-12-29 NOTE — PROGRESS NOTE ADULT - NUTRITIONAL ASSESSMENT
This patient has been assessed with a concern for Malnutrition and has been determined to have a diagnosis/diagnoses of Underweight (BMI < 19) and Severe protein-calorie malnutrition.    This patient is being managed with:   Diet NPO with Tube Feed-  Tube Feeding Modality: Nasogastric  Glucerna 1.2 Vidal (GLUCERNARTH)  Total Volume for 24 Hours (mL): 1320  Continuous  Starting Tube Feed Rate {mL per Hour}: 55  Increase Tube Feed Rate by (mL): 0     Every 6 hours  Until Goal Tube Feed Rate (mL per Hour): 55  Tube Feed Duration (in Hours): 24  Tube Feed Start Time: 00:00  Carlos(7 Gm Arginine/7 Gm Glut/1.2 Gm HMB     Qty per Day:  2  Entered: Dec 27 2023  8:36AM

## 2023-12-29 NOTE — PROGRESS NOTE ADULT - PROBLEM SELECTOR PLAN 1
P/w progressively worsening lethargy/encephalopathy i/s/o no PO intake for 2-3 days; at baseline, answers simple questions (Y/N, name) per son. Most likely ddx include metabolic (hypernatremia) vs infection (c/f UTI) vs mixed    - Currently AOx0, non-verbal, opens eyes to voice, unable to follow commands  - Manage chronic hypernatremia (see the specific section of the plan)  - now completed UTI treatment course Overnight RRT called for hypoxia, currently on HFNC. Suspect likely aspiration pneumonitis. CXR negative for focal consolidations.    - wean o2 as tolerated  - continue vanc/zosyn for empiric coverage  - chest PT   - f/u blood cultures, urine culture  - NPO for now

## 2023-12-29 NOTE — PROGRESS NOTE ADULT - ASSESSMENT
68F with PMH significant for breast cancer s/p L mastectomy, TBI (s/p a fall ~2 years ago) c/b aphasia, meningioma, hypothyroidism (hx Grave's dz s/p radioactive iodine), CVA, and dysphagia who presents to [facility] ED on 12/20/2023 with no PO intake and increased lethargy in the last 2-3 days, admitted for hypernatremia 166 and UTI.    68F with PMH significant for breast cancer s/p L mastectomy, TBI (s/p a fall ~2 years ago) c/b aphasia, meningioma, hypothyroidism (hx Grave's dz s/p radioactive iodine), CVA, and dysphagia who presents to [facility] ED on 12/20/2023 with no PO intake and increased lethargy in the last 2-3 days, admitted for hypernatremia 166 and UTI.    68F with PMH significant for breast cancer s/p L mastectomy, TBI (s/p a fall ~2 years ago) c/b aphasia, meningioma, hypothyroidism (hx Grave's dz s/p radioactive iodine), CVA, and dysphagia who presents to [facility] ED on 12/20/2023 with no PO intake and increased lethargy in the last 2-3 days, admitted for hypernatremia 166 and UTI. Hospital course complicated by AHRF likely 2/2 to aspiration pneumonitis.    68F with PMH significant for breast cancer s/p L mastectomy, TBI (s/p a fall ~2 years ago) c/b aphasia, meningioma, hypothyroidism (hx Grave's dz s/p radioactive iodine), CVA, and dysphagia who presents to [facility] ED on 12/20/2023 with no PO intake and increased lethargy in the last 2-3 days, admitted for hypernatremia 166 and UTI. Hospital course complicated by AHRF likely 2/2 to aspiration pneumonitis.

## 2023-12-29 NOTE — PROVIDER CONTACT NOTE (CHANGE IN STATUS NOTIFICATION) - ACTION/TREATMENT ORDERED:
fluid bolus 500cc, Albumin 25ml., abx . see RRT note
Saline nasal spray,flonase, duoneb, vest chest PT

## 2023-12-29 NOTE — PROGRESS NOTE ADULT - SUBJECTIVE AND OBJECTIVE BOX
*******************************  Usha Reyes MD (PGY-1)  Internal Medicine  Contact via [employer] TEAMS  *******************************    YOUNGER, JONATHAN  68y  Female    Patient is a 68y old  Female who presents with a chief complaint of No PO intake, lethargy (28 Dec 2023 15:53)      Subjective:    Objective:  T(C): 36.8 (12-29-23 @ 05:56), Max: 37.3 (12-28-23 @ 12:58)  HR: 86 (12-29-23 @ 05:56) (73 - 95)  BP: 90/59 (12-29-23 @ 05:56) (89/47 - 110/65)  RR: 18 (12-29-23 @ 05:56) (17 - 20)  SpO2: 88% (12-29-23 @ 05:56) (88% - 96%)  I&O's Summary      PHYSICAL EXAM:  GENERAL: NAD, well-groomed, well-developed  HEAD:  Atraumatic, Normocephalic  EYES: EOMI, PERRLA, conjunctiva and sclera clear  ENMT: No tonsillar erythema, exudates, or enlargement; Moist mucous membranes, Good dentition, No lesions  NECK: Supple, No JVD, Normal thyroid  NERVOUS SYSTEM:  Alert & Oriented X3, Good concentration; Motor Strength 5/5 B/L upper and lower extremities; DTRs 2+ intact and symmetric  CHEST/LUNG: Clear to auscultation bilaterally; No rales, rhonchi, wheezing, or rubs  HEART: Regular rate and rhythm; No murmurs, rubs, or gallops  ABDOMEN: Soft, Nontender, Nondistended; Bowel sounds present  EXTREMITIES:  2+ Peripheral Pulses, No clubbing, cyanosis, or edema  LYMPH: No lymphadenopathy noted  SKIN: No rashes or lesions    LABS:      CAPILLARY BLOOD GLUCOSE      POCT Blood Glucose.: 136 mg/dL (29 Dec 2023 06:38)  POCT Blood Glucose.: 147 mg/dL (29 Dec 2023 05:50)  POCT Blood Glucose.: 124 mg/dL (29 Dec 2023 02:34)  POCT Blood Glucose.: 107 mg/dL (29 Dec 2023 00:02)  POCT Blood Glucose.: 135 mg/dL (28 Dec 2023 18:14)  POCT Blood Glucose.: 138 mg/dL (28 Dec 2023 12:07)      RADIOLOGY & ADDITIONAL TESTS:    MEDICATIONS  (STANDING):  albumin human  5% IVPB 250 milliLiter(s) IV Intermittent once  ascorbic acid 500 milliGRAM(s) Oral daily  collagenase Ointment 1 Application(s) Topical daily  enoxaparin Injectable 40 milliGRAM(s) SubCutaneous every 24 hours  folic acid 1 milliGRAM(s) Oral daily  levothyroxine 112 MICROGram(s) Oral daily  multivitamin 1 Tablet(s) Oral daily  piperacillin/tazobactam IVPB.. 3.375 Gram(s) IV Intermittent every 12 hours  polyethylene glycol 3350 17 Gram(s) Oral daily  senna 2 Tablet(s) Oral at bedtime  sertraline 25 milliGRAM(s) Oral daily  thiamine 100 milliGRAM(s) Oral daily    MEDICATIONS  (PRN):             *******************************  Usha Reyes MD (PGY-1)  Internal Medicine  Contact via [employer] TEAMS  *******************************    YOUNGER, JONATHAN  68y  Female    Patient is a 68y old  Female who presents with a chief complaint of No PO intake, lethargy (28 Dec 2023 15:53)    Interval hx: 2 RRTs overnight. First for hypotension and hypoxia, found to be febrile 101.5-> broadened to vanc +zosyn, placed on HFNC. Second RRT for hypotension, received albumin 250cc.     Subjective: Seen at bedside this morning. A&O x0, opened eyes to voice.     Objective:  T(C): 36.8 (12-29-23 @ 05:56), Max: 37.3 (12-28-23 @ 12:58)  HR: 86 (12-29-23 @ 05:56) (73 - 95)  BP: 90/59 (12-29-23 @ 05:56) (89/47 - 110/65)  RR: 18 (12-29-23 @ 05:56) (17 - 20)  SpO2: 88% (12-29-23 @ 05:56) (88% - 96%)  I&O's Summary    PHYSICAL EXAM:  GENERAL: NAD, cachetic, chronically ill-appearing   HEAD:  Atraumatic, Normocephalic  EYES: EOMI, PERRLA, conjunctiva and sclera clear  ENMT: dry mucous membranes  NECK: Supple, trachea midline   NERVOUS SYSTEM:  Alert & Oriented X0, unable to follow commands appropriately  CHEST/LUNG: coarse breath sounds, rales on anterior exam  HEART: Regular rate and rhythm; No murmurs, rubs, or gallops  ABDOMEN: Soft, Nontender, Nondistended; Bowel sounds present  EXTREMITIES:  2+ Peripheral Pulses, No clubbing, cyanosis, or edema  LYMPH: No lymphadenopathy noted  SKIN: No rashes or lesions    LABS:                  11.2   5.13  )-----------( 228      ( 29 Dec 2023 07:10 )             37.2     12-29    142  |  108  |  13  ----------------------------<  142<H>  3.8   |  24  |  0.42<L>    Ca    7.6<L>      29 Dec 2023 07:10  Phos  2.4     12-29  Mg     2.3     12-29    TPro  5.4<L>  /  Alb  2.1<L>  /  TBili  0.5  /  DBili  x   /  AST  23  /  ALT  26  /  AlkPhos  60  12-29        ABG - ( 29 Dec 2023 02:50 )  pH, Arterial: 7.55  pH, Blood: x     /  pCO2: 30    /  pO2: 58    / HCO3: 26    / Base Excess: 4.4   /  SaO2: 94.1      Culture Results:   No growth at 24 hours (12-28 @ 01:32)  Culture Results:   >100,000 CFU/ml Escherichia coli (12-20 @ 11:54)  Culture Results:   No growth at 5 days (12-20 @ 11:30)  Culture Results:   No growth at 5 days (12-20 @ 11:15)    CAPILLARY BLOOD GLUCOSE  POCT Blood Glucose.: 136 mg/dL (29 Dec 2023 06:38)  POCT Blood Glucose.: 136 mg/dL (29 Dec 2023 06:38)  POCT Blood Glucose.: 147 mg/dL (29 Dec 2023 05:50)  POCT Blood Glucose.: 124 mg/dL (29 Dec 2023 02:34)  POCT Blood Glucose.: 107 mg/dL (29 Dec 2023 00:02)  POCT Blood Glucose.: 135 mg/dL (28 Dec 2023 18:14)  POCT Blood Glucose.: 138 mg/dL (28 Dec 2023 12:07)    RADIOLOGY & ADDITIONAL TESTS:  < from: Xray Chest 1 View-PORTABLE IMMEDIATE (Xray Chest 1 View-PORTABLE IMMEDIATE .) (12.29.23 @ 03:20) >  FINDINGS:  Surgical clips in the left lateral chest wall.  No focal consolidation.  Bibasilar atelectasis.  There is no pleural effusion or pneumothorax.  The heart is normal in size  The visualized osseous structures demonstrate no acute pathology.    IMPRESSION:  No focal consolidation.  Bibasilar atelectasis.    < end of copied text >    MEDICATIONS  (STANDING):  albumin human  5% IVPB 250 milliLiter(s) IV Intermittent once  ascorbic acid 500 milliGRAM(s) Oral daily  collagenase Ointment 1 Application(s) Topical daily  enoxaparin Injectable 40 milliGRAM(s) SubCutaneous every 24 hours  folic acid 1 milliGRAM(s) Oral daily  levothyroxine 112 MICROGram(s) Oral daily  multivitamin 1 Tablet(s) Oral daily  piperacillin/tazobactam IVPB.. 3.375 Gram(s) IV Intermittent every 12 hours  polyethylene glycol 3350 17 Gram(s) Oral daily  senna 2 Tablet(s) Oral at bedtime  sertraline 25 milliGRAM(s) Oral daily  thiamine 100 milliGRAM(s) Oral daily    MEDICATIONS  (PRN):

## 2023-12-29 NOTE — PROGRESS NOTE ADULT - PROBLEM SELECTOR PLAN 3
Hx of dysphagia since 2-3 months ago per son. Notable for severe b/l ext contracture and cachexia. Low PO intake at baseline, no PO intake in the last 2-3 days. NGT placed 12/22, trickle feeds.   S&S: Puree/moderately thick liquids via 1/2 tsp amount only; however pt unlikely to get adequate calories.     - pt hyperglycemic on jevity, per nutrition recs -> changed to glucerna 55cc/hr  - per discussion w/  who is HCP, feeding tube is in line w/ patient's wishes.   - f/u palliative care consult -  not ready to make decisions on advanced directions. would like to pursue aggressive workup for dysphagia.   - monitor for refeeding syndrome Hx of dysphagia since 2-3 months ago per son. Notable for severe b/l ext contracture and cachexia. Low PO intake at baseline, no PO intake in the last 2-3 days. NGT placed 12/22, trickle feeds.   S&S: Puree/moderately thick liquids via 1/2 tsp amount only; however pt unlikely to get adequate calories. Repeat assessment recommend NPO.   NG tube removed during RRT overnight, will hold off on replacing NG tube given concern patient likely aspirated.     - NPO for now  - will need further GOC

## 2023-12-29 NOTE — PROGRESS NOTE ADULT - PROBLEM SELECTOR PLAN 2
Symptoms of lethargy/encephalopathy >48h with no PO intake, likely has chronic hypernatremia (vs acute) 2/2 severe dehydration. Na 166 on admission, improved w/ D5.   Na 150    - monitor BMP daily  - continue free water 300 q4h Hx of recurrent UTI (last admission 9/2023). Recent urine culture showing 100k Ecoli. S/p IV ceftriaxone (12/21-23). Febrile overnight 12/27, repeat U/A positive.     - f/u urine culture  - now on vanc/zosyn for broader abx coverage

## 2023-12-29 NOTE — PROGRESS NOTE ADULT - PROBLEM SELECTOR PLAN 5
Multiple pressure ulcers noted on physical exam with varying stages (I-III), some with peeling and discoloration, possible tissue loss  - Son reports pt using Santyl topical at home  - wound care consulted, appreciate recs P/w progressively worsening lethargy/encephalopathy i/s/o no PO intake for 2-3 days; at baseline, answers simple questions (Y/N, name) per son. Most likely ddx include metabolic (hypernatremia) vs infection (c/f UTI) vs mixed    - Currently AOx0, non-verbal, opens eyes to voice, unable to follow commands  - chronic hypernatremia resolved but likely ongoing infectious etiology

## 2023-12-29 NOTE — PROGRESS NOTE ADULT - PROBLEM SELECTOR PLAN 9
- DVT ppx: Lovenox (40 mg QD; CrCl >30 ml/min) and SCDs  - Diet: NPO  - Dispo: pending further GOC discussions

## 2023-12-29 NOTE — PROGRESS NOTE ADULT - PROBLEM SELECTOR PLAN 7
Hx of Grave's disease s/p radioactive iodine  - resumed home levothyroxine 112mcg via NG tube Hx of breast cancer, s/p L mastectomy  - Holding home Tamoxifen (NPO)

## 2023-12-29 NOTE — PROVIDER CONTACT NOTE (OTHER) - ASSESSMENT
Pt nonverbal at baseline. NG tube appears to may have been no longer been in place- tube feeds shut off. Nonrebreather applied and saturation only came up to low 80's- rapid response called

## 2023-12-29 NOTE — PROGRESS NOTE ADULT - PROBLEM SELECTOR PLAN 4
Hx of recurrent UTI (last admission 9/2023). Recent urine culture showing 100k Ecoli.   - Positive UA, s/p IV CTX 1 mg x1 in ED  - completed 3 day course 12/21-23 Symptoms of lethargy/encephalopathy >48h with no PO intake, likely has chronic hypernatremia (vs acute) 2/2 severe dehydration. Na 166 on admission, improved w/ D5.   Resolved     - monitor BMP daily

## 2023-12-29 NOTE — PROGRESS NOTE ADULT - PROBLEM SELECTOR PLAN 6
Hx of breast cancer, s/p L mastectomy  - Holding home Tamoxifen (NPO) Multiple pressure ulcers noted on physical exam with varying stages (I-III), some with peeling and discoloration, possible tissue loss  - Son reports pt using Santyl topical at home  - wound care consulted, appreciate recs

## 2023-12-29 NOTE — RAPID RESPONSE TEAM SUMMARY - NSSITUATIONBACKGROUNDRRT_GEN_ALL_CORE
68F with PMH significant for breast cancer s/p L mastectomy, TBI (s/p a fall ~2 years ago) c/b aphasia, meningioma, hypothyroidism (hx Grave's dz s/p radioactive iodine), CVA, and dysphagia who presents to [facility] ED on 12/20/2023 with no PO intake and increased lethargy, admitted for hypernatremia 166 and UTI. Course complicated by progressive encephalopathy and severe malnutrition. RRT called for Hypoxia to 80's and hypotension to the 70's. On arrival Hypoxic to 80's and Hypotensive. Afebrile on Axillary temp. On repeat rectal temp 101.5F. 1L IVF bolus administered in addition to Vanc and zosyn after BCx and blood samples obtained. BP improved shortly after.  For Hypoxia she was placed on Non rebreather but sats remained low so she was placed on HFNC and NT/OP suctioned with small amount of secretions obtained. CXR was repeated and appeared to be without focal consolidation on my read. Duo nebs with hypersal was also administered considering rales on exam. Sats improved to 91-94%.  68F with PMH significant for breast cancer s/p L mastectomy, TBI (s/p a fall ~2 years ago) c/b aphasia, meningioma, hypothyroidism (hx Grave's dz s/p radioactive iodine), CVA, and dysphagia who presents to [facility] ED on 12/20/2023 with no PO intake and increased lethargy, admitted for hypernatremia 166 and UTI. Course complicated by progressive encephalopathy and severe malnutrition. RRT called for Hypoxia to 80's and hypotension to the 70's. On arrival Hypoxic to 80's and Hypotensive. Afebrile on Axillary temp. On repeat rectal temp 101.5F. 1L IVF bolus administered in addition to Vanc and zosyn after BCx and blood samples obtained. BP improved shortly after.  For Hypoxia she was placed on Non rebreather but sats remained low so she was placed on HFNC and NT/OP suctioned with small amount of secretions obtained. CXR was repeated and appeared to be without focal consolidation on my read. Duo nebs with hypersal was also administered considering rales on exam. Sats improved to 91-94%.

## 2023-12-30 LAB
ANION GAP SERPL CALC-SCNC: 7 MMOL/L — SIGNIFICANT CHANGE UP (ref 5–17)
ANION GAP SERPL CALC-SCNC: 7 MMOL/L — SIGNIFICANT CHANGE UP (ref 5–17)
BUN SERPL-MCNC: 11 MG/DL — SIGNIFICANT CHANGE UP (ref 7–23)
BUN SERPL-MCNC: 11 MG/DL — SIGNIFICANT CHANGE UP (ref 7–23)
CALCIUM SERPL-MCNC: 7.8 MG/DL — LOW (ref 8.4–10.5)
CALCIUM SERPL-MCNC: 7.8 MG/DL — LOW (ref 8.4–10.5)
CHLORIDE SERPL-SCNC: 111 MMOL/L — HIGH (ref 96–108)
CHLORIDE SERPL-SCNC: 111 MMOL/L — HIGH (ref 96–108)
CO2 SERPL-SCNC: 25 MMOL/L — SIGNIFICANT CHANGE UP (ref 22–31)
CO2 SERPL-SCNC: 25 MMOL/L — SIGNIFICANT CHANGE UP (ref 22–31)
CREAT SERPL-MCNC: 0.34 MG/DL — LOW (ref 0.5–1.3)
CREAT SERPL-MCNC: 0.34 MG/DL — LOW (ref 0.5–1.3)
EGFR: 111 ML/MIN/1.73M2 — SIGNIFICANT CHANGE UP
EGFR: 111 ML/MIN/1.73M2 — SIGNIFICANT CHANGE UP
GLUCOSE BLDC GLUCOMTR-MCNC: 115 MG/DL — HIGH (ref 70–99)
GLUCOSE BLDC GLUCOMTR-MCNC: 115 MG/DL — HIGH (ref 70–99)
GLUCOSE BLDC GLUCOMTR-MCNC: 124 MG/DL — HIGH (ref 70–99)
GLUCOSE BLDC GLUCOMTR-MCNC: 124 MG/DL — HIGH (ref 70–99)
GLUCOSE BLDC GLUCOMTR-MCNC: 128 MG/DL — HIGH (ref 70–99)
GLUCOSE BLDC GLUCOMTR-MCNC: 128 MG/DL — HIGH (ref 70–99)
GLUCOSE SERPL-MCNC: 131 MG/DL — HIGH (ref 70–99)
GLUCOSE SERPL-MCNC: 131 MG/DL — HIGH (ref 70–99)
MAGNESIUM SERPL-MCNC: 2.1 MG/DL — SIGNIFICANT CHANGE UP (ref 1.6–2.6)
MAGNESIUM SERPL-MCNC: 2.1 MG/DL — SIGNIFICANT CHANGE UP (ref 1.6–2.6)
PHOSPHATE SERPL-MCNC: 2.5 MG/DL — SIGNIFICANT CHANGE UP (ref 2.5–4.5)
PHOSPHATE SERPL-MCNC: 2.5 MG/DL — SIGNIFICANT CHANGE UP (ref 2.5–4.5)
POTASSIUM SERPL-MCNC: 3.7 MMOL/L — SIGNIFICANT CHANGE UP (ref 3.5–5.3)
POTASSIUM SERPL-MCNC: 3.7 MMOL/L — SIGNIFICANT CHANGE UP (ref 3.5–5.3)
POTASSIUM SERPL-SCNC: 3.7 MMOL/L — SIGNIFICANT CHANGE UP (ref 3.5–5.3)
POTASSIUM SERPL-SCNC: 3.7 MMOL/L — SIGNIFICANT CHANGE UP (ref 3.5–5.3)
SODIUM SERPL-SCNC: 143 MMOL/L — SIGNIFICANT CHANGE UP (ref 135–145)
SODIUM SERPL-SCNC: 143 MMOL/L — SIGNIFICANT CHANGE UP (ref 135–145)

## 2023-12-30 PROCEDURE — 99233 SBSQ HOSP IP/OBS HIGH 50: CPT

## 2023-12-30 RX ORDER — SODIUM CHLORIDE 9 MG/ML
4 INJECTION INTRAMUSCULAR; INTRAVENOUS; SUBCUTANEOUS EVERY 12 HOURS
Refills: 0 | Status: DISCONTINUED | OUTPATIENT
Start: 2023-12-30 | End: 2024-01-04

## 2023-12-30 RX ORDER — SODIUM CHLORIDE 9 MG/ML
4 INJECTION INTRAMUSCULAR; INTRAVENOUS; SUBCUTANEOUS EVERY 12 HOURS
Refills: 0 | Status: DISCONTINUED | OUTPATIENT
Start: 2023-12-30 | End: 2023-12-30

## 2023-12-30 RX ORDER — SODIUM CHLORIDE 9 MG/ML
1000 INJECTION, SOLUTION INTRAVENOUS
Refills: 0 | Status: DISCONTINUED | OUTPATIENT
Start: 2023-12-30 | End: 2024-01-04

## 2023-12-30 RX ORDER — IPRATROPIUM/ALBUTEROL SULFATE 18-103MCG
3 AEROSOL WITH ADAPTER (GRAM) INHALATION EVERY 6 HOURS
Refills: 0 | Status: DISCONTINUED | OUTPATIENT
Start: 2023-12-30 | End: 2024-01-04

## 2023-12-30 RX ADMIN — Medication 56 MICROGRAM(S): at 22:42

## 2023-12-30 RX ADMIN — Medication 3 MILLILITER(S): at 23:48

## 2023-12-30 RX ADMIN — PIPERACILLIN AND TAZOBACTAM 25 GRAM(S): 4; .5 INJECTION, POWDER, LYOPHILIZED, FOR SOLUTION INTRAVENOUS at 17:36

## 2023-12-30 RX ADMIN — Medication 1 APPLICATION(S): at 19:01

## 2023-12-30 RX ADMIN — Medication 3 MILLILITER(S): at 17:41

## 2023-12-30 RX ADMIN — SODIUM CHLORIDE 4 MILLILITER(S): 9 INJECTION INTRAMUSCULAR; INTRAVENOUS; SUBCUTANEOUS at 17:41

## 2023-12-30 RX ADMIN — SODIUM CHLORIDE 50 MILLILITER(S): 9 INJECTION, SOLUTION INTRAVENOUS at 07:05

## 2023-12-30 RX ADMIN — Medication 56 MICROGRAM(S): at 00:23

## 2023-12-30 RX ADMIN — ENOXAPARIN SODIUM 40 MILLIGRAM(S): 100 INJECTION SUBCUTANEOUS at 06:23

## 2023-12-30 RX ADMIN — PIPERACILLIN AND TAZOBACTAM 25 GRAM(S): 4; .5 INJECTION, POWDER, LYOPHILIZED, FOR SOLUTION INTRAVENOUS at 06:24

## 2023-12-30 NOTE — PROGRESS NOTE ADULT - PROBLEM SELECTOR PLAN 4
Symptoms of lethargy/encephalopathy >48h with no PO intake, likely has chronic hypernatremia (vs acute) 2/2 severe dehydration. Na 166 on admission, improved w/ D5.   Resolved     - monitor BMP daily

## 2023-12-30 NOTE — PROGRESS NOTE ADULT - PROBLEM SELECTOR PLAN 1
Overnight RRT called for hypoxia, currently on HFNC. Suspect likely aspiration pneumonitis. CXR negative for focal consolidations.    - wean o2 as tolerated  - continue vanc/zosyn for empiric coverage  - chest PT   - f/u blood cultures, urine culture  - NPO for now RRT on 12/28 for hypoxia, currently on HFNC. Suspect likely aspiration pneumonitis. CXR negative for focal consolidations.    - wean o2 as tolerated, will trial venturi   - continue zosyn for 5 days  (12/29 - )  - chest PT   - f/u blood cultures

## 2023-12-30 NOTE — PROGRESS NOTE ADULT - PROBLEM SELECTOR PLAN 2
Hx of recurrent UTI (last admission 9/2023). Recent urine culture showing 100k Ecoli. S/p IV ceftriaxone (12/21-23). Febrile overnight 12/27, repeat U/A positive.     - f/u urine culture  - now on vanc/zosyn for broader abx coverage Hx of recurrent UTI (last admission 9/2023). Recent urine culture showing 100k Ecoli. S/p IV ceftriaxone (12/21-23). Febrile overnight 12/27, repeat U/A positive. Restarted on ceftriaxone (12/28-29) but escalated in setting of ongoing aspiration pneumonia.     - f/u urine culture  - now on zosyn

## 2023-12-30 NOTE — PROGRESS NOTE ADULT - PROBLEM SELECTOR PLAN 5
P/w progressively worsening lethargy/encephalopathy i/s/o no PO intake for 2-3 days; at baseline, answers simple questions (Y/N, name) per son. Most likely ddx include metabolic (hypernatremia) vs infection (c/f UTI) vs mixed    - Currently AOx0, non-verbal, opens eyes to voice, unable to follow commands  - chronic hypernatremia resolved but likely ongoing infectious etiology

## 2023-12-30 NOTE — PROGRESS NOTE ADULT - SUBJECTIVE AND OBJECTIVE BOX
*******************************  Usha Reyes MD (PGY-1)  Internal Medicine  Contact via [employer] TEAMS  *******************************    YOUNGER, JONATHAN  68y  Female    Patient is a 68y old  Female who presents with a chief complaint of No PO intake, lethargy (29 Dec 2023 07:12)      Subjective:    Objective:  T(C): 36.6 (12-30-23 @ 05:22), Max: 37.1 (12-29-23 @ 21:02)  HR: 71 (12-30-23 @ 05:22) (70 - 82)  BP: 92/51 (12-30-23 @ 05:22) (88/58 - 92/57)  RR: 18 (12-30-23 @ 05:22) (18 - 20)  SpO2: 100% (12-30-23 @ 05:22) (95% - 100%)  I&O's Summary      PHYSICAL EXAM:  GENERAL: NAD, well-groomed, well-developed  HEAD:  Atraumatic, Normocephalic  EYES: EOMI, PERRLA, conjunctiva and sclera clear  ENMT: No tonsillar erythema, exudates, or enlargement; Moist mucous membranes, Good dentition, No lesions  NECK: Supple, No JVD, Normal thyroid  NERVOUS SYSTEM:  Alert & Oriented X3, Good concentration; Motor Strength 5/5 B/L upper and lower extremities; DTRs 2+ intact and symmetric  CHEST/LUNG: Clear to auscultation bilaterally; No rales, rhonchi, wheezing, or rubs  HEART: Regular rate and rhythm; No murmurs, rubs, or gallops  ABDOMEN: Soft, Nontender, Nondistended; Bowel sounds present  EXTREMITIES:  2+ Peripheral Pulses, No clubbing, cyanosis, or edema  LYMPH: No lymphadenopathy noted  SKIN: No rashes or lesions    LABS:      CAPILLARY BLOOD GLUCOSE      POCT Blood Glucose.: 124 mg/dL (30 Dec 2023 05:56)  POCT Blood Glucose.: 120 mg/dL (29 Dec 2023 23:54)  POCT Blood Glucose.: 149 mg/dL (29 Dec 2023 17:59)  POCT Blood Glucose.: 142 mg/dL (29 Dec 2023 12:58)      RADIOLOGY & ADDITIONAL TESTS:    MEDICATIONS  (STANDING):  ascorbic acid 500 milliGRAM(s) Oral daily  collagenase Ointment 1 Application(s) Topical daily  dextrose 5% + lactated ringers. 1000 milliLiter(s) (50 mL/Hr) IV Continuous <Continuous>  enoxaparin Injectable 40 milliGRAM(s) SubCutaneous every 24 hours  folic acid 1 milliGRAM(s) Oral daily  levothyroxine Injectable 56 MICROGram(s) IV Push at bedtime  multivitamin 1 Tablet(s) Oral daily  piperacillin/tazobactam IVPB.. 3.375 Gram(s) IV Intermittent every 12 hours  polyethylene glycol 3350 17 Gram(s) Oral daily  senna 2 Tablet(s) Oral at bedtime  sertraline 25 milliGRAM(s) Oral daily  thiamine 100 milliGRAM(s) Oral daily    MEDICATIONS  (PRN):             *******************************  Usha Reyse MD (PGY-1)  Internal Medicine  Contact via [employer] TEAMS  *******************************    YOUNGER, JONATHAN  68y  Female    Patient is a 68y old  Female who presents with a chief complaint of No PO intake, lethargy (29 Dec 2023 07:12)    Subjective: No acute events overnight. Seen at bedside this AM, A&O x0 but able to follow simple commands like taking deep breaths.     Objective:  T(C): 36.6 (12-30-23 @ 05:22), Max: 37.1 (12-29-23 @ 21:02)  HR: 71 (12-30-23 @ 05:22) (70 - 82)  BP: 92/51 (12-30-23 @ 05:22) (88/58 - 92/57)  RR: 18 (12-30-23 @ 05:22) (18 - 20)  SpO2: 100% (12-30-23 @ 05:22) (95% - 100%)  I&O's Summary    PHYSICAL EXAM:  GENERAL: NAD, cachetic, chronically ill-appearing   HEAD:  Atraumatic, Normocephalic  EYES: EOMI, PERRLA, conjunctiva and sclera clear  ENMT: dry mucous membranes  NECK: Supple, trachea midline   NERVOUS SYSTEM:  Alert & Oriented X0, able to follow very basic commands   CHEST/LUNG: rales on lung exam; No rales, rhonchi, wheezing, or rubs  HEART: Regular rate and rhythm; No murmurs, rubs, or gallops  ABDOMEN: Soft, Nontender, Nondistended; Bowel sounds present  EXTREMITIES:  2+ Peripheral Pulses, contracted   LYMPH: No lymphadenopathy noted  SKIN: No rashes or lesions    LABS:  no AM labs     CAPILLARY BLOOD GLUCOSE  POCT Blood Glucose.: 124 mg/dL (30 Dec 2023 05:56)  POCT Blood Glucose.: 120 mg/dL (29 Dec 2023 23:54)  POCT Blood Glucose.: 149 mg/dL (29 Dec 2023 17:59)  POCT Blood Glucose.: 142 mg/dL (29 Dec 2023 12:58)    RADIOLOGY & ADDITIONAL TESTS:    MEDICATIONS  (STANDING):  ascorbic acid 500 milliGRAM(s) Oral daily  collagenase Ointment 1 Application(s) Topical daily  dextrose 5% + lactated ringers. 1000 milliLiter(s) (50 mL/Hr) IV Continuous <Continuous>  enoxaparin Injectable 40 milliGRAM(s) SubCutaneous every 24 hours  folic acid 1 milliGRAM(s) Oral daily  levothyroxine Injectable 56 MICROGram(s) IV Push at bedtime  multivitamin 1 Tablet(s) Oral daily  piperacillin/tazobactam IVPB.. 3.375 Gram(s) IV Intermittent every 12 hours  polyethylene glycol 3350 17 Gram(s) Oral daily  senna 2 Tablet(s) Oral at bedtime  sertraline 25 milliGRAM(s) Oral daily  thiamine 100 milliGRAM(s) Oral daily    MEDICATIONS  (PRN):             *******************************  Usha Reyes MD (PGY-1)  Internal Medicine  Contact via [employer] TEAMS  *******************************    YOUNGER, JONATHAN  68y  Female    Patient is a 68y old  Female who presents with a chief complaint of No PO intake, lethargy (29 Dec 2023 07:12)    Subjective: No acute events overnight. Seen at bedside this AM, A&O x0 but able to follow simple commands like taking deep breaths.     Objective:  T(C): 36.6 (12-30-23 @ 05:22), Max: 37.1 (12-29-23 @ 21:02)  HR: 71 (12-30-23 @ 05:22) (70 - 82)  BP: 92/51 (12-30-23 @ 05:22) (88/58 - 92/57)  RR: 18 (12-30-23 @ 05:22) (18 - 20)  SpO2: 100% (12-30-23 @ 05:22) (95% - 100%)  I&O's Summary    PHYSICAL EXAM:  GENERAL: NAD, cachetic, chronically ill-appearing   HEAD:  Atraumatic, Normocephalic  EYES: EOMI, PERRLA, conjunctiva and sclera clear  ENMT: dry mucous membranes  NECK: Supple, trachea midline   NERVOUS SYSTEM:  Alert & Oriented X0, able to follow very basic commands   CHEST/LUNG: rales on lung exam; No rales, rhonchi, wheezing, or rubs  HEART: Regular rate and rhythm; No murmurs, rubs, or gallops  ABDOMEN: Soft, Nontender, Nondistended; Bowel sounds present  EXTREMITIES:  2+ Peripheral Pulses, contracted   LYMPH: No lymphadenopathy noted  SKIN: No rashes or lesions    LABS:  no AM labs     CAPILLARY BLOOD GLUCOSE  POCT Blood Glucose.: 124 mg/dL (30 Dec 2023 05:56)  POCT Blood Glucose.: 120 mg/dL (29 Dec 2023 23:54)  POCT Blood Glucose.: 149 mg/dL (29 Dec 2023 17:59)  POCT Blood Glucose.: 142 mg/dL (29 Dec 2023 12:58)    RADIOLOGY & ADDITIONAL TESTS:    MEDICATIONS  (STANDING):  ascorbic acid 500 milliGRAM(s) Oral daily  collagenase Ointment 1 Application(s) Topical daily  dextrose 5% + lactated ringers. 1000 milliLiter(s) (50 mL/Hr) IV Continuous <Continuous>  enoxaparin Injectable 40 milliGRAM(s) SubCutaneous every 24 hours  folic acid 1 milliGRAM(s) Oral daily  levothyroxine Injectable 56 MICROGram(s) IV Push at bedtime  multivitamin 1 Tablet(s) Oral daily  piperacillin/tazobactam IVPB.. 3.375 Gram(s) IV Intermittent every 12 hours  polyethylene glycol 3350 17 Gram(s) Oral daily  senna 2 Tablet(s) Oral at bedtime  sertraline 25 milliGRAM(s) Oral daily  thiamine 100 milliGRAM(s) Oral daily    MEDICATIONS  (PRN):

## 2023-12-30 NOTE — PROGRESS NOTE ADULT - PROBLEM SELECTOR PLAN 3
Hx of dysphagia since 2-3 months ago per son. Notable for severe b/l ext contracture and cachexia. Low PO intake at baseline, no PO intake in the last 2-3 days. NGT placed 12/22, trickle feeds.   S&S: Puree/moderately thick liquids via 1/2 tsp amount only; however pt unlikely to get adequate calories. Repeat assessment recommend NPO.   NG tube removed during RRT overnight, will hold off on replacing NG tube given concern patient likely aspirated.     - NPO for now  - will need further GOC Hx of dysphagia since 2-3 months ago per son. Notable for severe b/l ext contracture and cachexia. Low PO intake at baseline, no PO intake in the last 2-3 days. NGT placed 12/22, trickle feeds.   S&S: Puree/moderately thick liquids via 1/2 tsp amount only; however pt unlikely to get adequate calories. Repeat assessment recommend NPO.   NG tube removed during RRT overnight, will hold off on replacing NG tube given concern patient likely aspirated.     - NPO for now, will trial NG tube placement if patient can tolerate on venturi mask. if not, will hold off pending de-escalation to NC   - will consult GI for PEG tube placement next week

## 2023-12-30 NOTE — PROGRESS NOTE ADULT - PROBLEM SELECTOR PLAN 9
- DVT ppx: Lovenox (40 mg QD; CrCl >30 ml/min) and SCDs  - Diet: NPO  - Dispo: pending further GOC discussions - DVT ppx: Lovenox (40 mg QD; CrCl >30 ml/min) and SCDs  - Diet: NPO for now, pending NG tube   - Dispo: pending further GOC discussions

## 2023-12-30 NOTE — PROGRESS NOTE ADULT - ASSESSMENT
68F with PMH significant for breast cancer s/p L mastectomy, TBI (s/p a fall ~2 years ago) c/b aphasia, meningioma, hypothyroidism (hx Grave's dz s/p radioactive iodine), CVA, and dysphagia who presents to [facility] ED on 12/20/2023 with no PO intake and increased lethargy in the last 2-3 days, admitted for hypernatremia 166 and UTI. Hospital course complicated by AHRF likely 2/2 to aspiration pneumonitis.    68F with PMH significant for breast cancer s/p L mastectomy, TBI (s/p a fall ~2 years ago) c/b aphasia, meningioma, hypothyroidism (hx Grave's dz s/p radioactive iodine), CVA, and dysphagia who presents to [facility] ED on 12/20/2023 with no PO intake and increased lethargy in the last 2-3 days, admitted for hypernatremia 166 and UTI. Hospital course complicated by AHRF likely 2/2 to aspiration pneumonitis.

## 2023-12-30 NOTE — PROGRESS NOTE ADULT - ATTENDING COMMENTS
Hypoxemic respiratory failure 2/2 aspiration pneumonitis vs PNA  Cover with zosyn  Weaned to 40/40 bedside SaO2 97% -> will further wean  Attempt to place NGT but unable due to patient intolerance  Functional quadriplegia with multiple pressure ulcers  Prognosis guarded

## 2023-12-31 LAB
ALBUMIN SERPL ELPH-MCNC: 2.1 G/DL — LOW (ref 3.3–5)
ALBUMIN SERPL ELPH-MCNC: 2.1 G/DL — LOW (ref 3.3–5)
ALP SERPL-CCNC: 53 U/L — SIGNIFICANT CHANGE UP (ref 40–120)
ALP SERPL-CCNC: 53 U/L — SIGNIFICANT CHANGE UP (ref 40–120)
ALT FLD-CCNC: 15 U/L — SIGNIFICANT CHANGE UP (ref 10–45)
ALT FLD-CCNC: 15 U/L — SIGNIFICANT CHANGE UP (ref 10–45)
ANION GAP SERPL CALC-SCNC: 10 MMOL/L — SIGNIFICANT CHANGE UP (ref 5–17)
ANION GAP SERPL CALC-SCNC: 10 MMOL/L — SIGNIFICANT CHANGE UP (ref 5–17)
AST SERPL-CCNC: 14 U/L — SIGNIFICANT CHANGE UP (ref 10–40)
AST SERPL-CCNC: 14 U/L — SIGNIFICANT CHANGE UP (ref 10–40)
BASOPHILS # BLD AUTO: 0 K/UL — SIGNIFICANT CHANGE UP (ref 0–0.2)
BASOPHILS # BLD AUTO: 0 K/UL — SIGNIFICANT CHANGE UP (ref 0–0.2)
BASOPHILS NFR BLD AUTO: 0 % — SIGNIFICANT CHANGE UP (ref 0–2)
BASOPHILS NFR BLD AUTO: 0 % — SIGNIFICANT CHANGE UP (ref 0–2)
BILIRUB SERPL-MCNC: 0.4 MG/DL — SIGNIFICANT CHANGE UP (ref 0.2–1.2)
BILIRUB SERPL-MCNC: 0.4 MG/DL — SIGNIFICANT CHANGE UP (ref 0.2–1.2)
BUN SERPL-MCNC: 7 MG/DL — SIGNIFICANT CHANGE UP (ref 7–23)
BUN SERPL-MCNC: 7 MG/DL — SIGNIFICANT CHANGE UP (ref 7–23)
CALCIUM SERPL-MCNC: 8.2 MG/DL — LOW (ref 8.4–10.5)
CALCIUM SERPL-MCNC: 8.2 MG/DL — LOW (ref 8.4–10.5)
CHLORIDE SERPL-SCNC: 109 MMOL/L — HIGH (ref 96–108)
CHLORIDE SERPL-SCNC: 109 MMOL/L — HIGH (ref 96–108)
CO2 SERPL-SCNC: 25 MMOL/L — SIGNIFICANT CHANGE UP (ref 22–31)
CO2 SERPL-SCNC: 25 MMOL/L — SIGNIFICANT CHANGE UP (ref 22–31)
CREAT SERPL-MCNC: 0.31 MG/DL — LOW (ref 0.5–1.3)
CREAT SERPL-MCNC: 0.31 MG/DL — LOW (ref 0.5–1.3)
EGFR: 113 ML/MIN/1.73M2 — SIGNIFICANT CHANGE UP
EGFR: 113 ML/MIN/1.73M2 — SIGNIFICANT CHANGE UP
ELLIPTOCYTES BLD QL SMEAR: SLIGHT — SIGNIFICANT CHANGE UP
ELLIPTOCYTES BLD QL SMEAR: SLIGHT — SIGNIFICANT CHANGE UP
EOSINOPHIL # BLD AUTO: 0.1 K/UL — SIGNIFICANT CHANGE UP (ref 0–0.5)
EOSINOPHIL # BLD AUTO: 0.1 K/UL — SIGNIFICANT CHANGE UP (ref 0–0.5)
EOSINOPHIL NFR BLD AUTO: 3.3 % — SIGNIFICANT CHANGE UP (ref 0–6)
EOSINOPHIL NFR BLD AUTO: 3.3 % — SIGNIFICANT CHANGE UP (ref 0–6)
GLUCOSE BLDC GLUCOMTR-MCNC: 115 MG/DL — HIGH (ref 70–99)
GLUCOSE BLDC GLUCOMTR-MCNC: 115 MG/DL — HIGH (ref 70–99)
GLUCOSE BLDC GLUCOMTR-MCNC: 116 MG/DL — HIGH (ref 70–99)
GLUCOSE BLDC GLUCOMTR-MCNC: 116 MG/DL — HIGH (ref 70–99)
GLUCOSE BLDC GLUCOMTR-MCNC: 128 MG/DL — HIGH (ref 70–99)
GLUCOSE BLDC GLUCOMTR-MCNC: 128 MG/DL — HIGH (ref 70–99)
GLUCOSE SERPL-MCNC: 114 MG/DL — HIGH (ref 70–99)
GLUCOSE SERPL-MCNC: 114 MG/DL — HIGH (ref 70–99)
HCT VFR BLD CALC: 29.2 % — LOW (ref 34.5–45)
HCT VFR BLD CALC: 29.2 % — LOW (ref 34.5–45)
HGB BLD-MCNC: 9.3 G/DL — LOW (ref 11.5–15.5)
HGB BLD-MCNC: 9.3 G/DL — LOW (ref 11.5–15.5)
LYMPHOCYTES # BLD AUTO: 0.48 K/UL — LOW (ref 1–3.3)
LYMPHOCYTES # BLD AUTO: 0.48 K/UL — LOW (ref 1–3.3)
LYMPHOCYTES # BLD AUTO: 16 % — SIGNIFICANT CHANGE UP (ref 13–44)
LYMPHOCYTES # BLD AUTO: 16 % — SIGNIFICANT CHANGE UP (ref 13–44)
MAGNESIUM SERPL-MCNC: 2.2 MG/DL — SIGNIFICANT CHANGE UP (ref 1.6–2.6)
MAGNESIUM SERPL-MCNC: 2.2 MG/DL — SIGNIFICANT CHANGE UP (ref 1.6–2.6)
MANUAL SMEAR VERIFICATION: SIGNIFICANT CHANGE UP
MANUAL SMEAR VERIFICATION: SIGNIFICANT CHANGE UP
MCHC RBC-ENTMCNC: 27.1 PG — SIGNIFICANT CHANGE UP (ref 27–34)
MCHC RBC-ENTMCNC: 27.1 PG — SIGNIFICANT CHANGE UP (ref 27–34)
MCHC RBC-ENTMCNC: 31.8 GM/DL — LOW (ref 32–36)
MCHC RBC-ENTMCNC: 31.8 GM/DL — LOW (ref 32–36)
MCV RBC AUTO: 85.1 FL — SIGNIFICANT CHANGE UP (ref 80–100)
MCV RBC AUTO: 85.1 FL — SIGNIFICANT CHANGE UP (ref 80–100)
MONOCYTES # BLD AUTO: 0.18 K/UL — SIGNIFICANT CHANGE UP (ref 0–0.9)
MONOCYTES # BLD AUTO: 0.18 K/UL — SIGNIFICANT CHANGE UP (ref 0–0.9)
MONOCYTES NFR BLD AUTO: 5.9 % — SIGNIFICANT CHANGE UP (ref 2–14)
MONOCYTES NFR BLD AUTO: 5.9 % — SIGNIFICANT CHANGE UP (ref 2–14)
NEUTROPHILS # BLD AUTO: 2.24 K/UL — SIGNIFICANT CHANGE UP (ref 1.8–7.4)
NEUTROPHILS # BLD AUTO: 2.24 K/UL — SIGNIFICANT CHANGE UP (ref 1.8–7.4)
NEUTROPHILS NFR BLD AUTO: 71.4 % — SIGNIFICANT CHANGE UP (ref 43–77)
NEUTROPHILS NFR BLD AUTO: 71.4 % — SIGNIFICANT CHANGE UP (ref 43–77)
NEUTS BAND # BLD: 3.4 % — SIGNIFICANT CHANGE UP (ref 0–8)
NEUTS BAND # BLD: 3.4 % — SIGNIFICANT CHANGE UP (ref 0–8)
PHOSPHATE SERPL-MCNC: 2.5 MG/DL — SIGNIFICANT CHANGE UP (ref 2.5–4.5)
PHOSPHATE SERPL-MCNC: 2.5 MG/DL — SIGNIFICANT CHANGE UP (ref 2.5–4.5)
PLAT MORPH BLD: NORMAL — SIGNIFICANT CHANGE UP
PLAT MORPH BLD: NORMAL — SIGNIFICANT CHANGE UP
PLATELET # BLD AUTO: 173 K/UL — SIGNIFICANT CHANGE UP (ref 150–400)
PLATELET # BLD AUTO: 173 K/UL — SIGNIFICANT CHANGE UP (ref 150–400)
POIKILOCYTOSIS BLD QL AUTO: SIGNIFICANT CHANGE UP
POIKILOCYTOSIS BLD QL AUTO: SIGNIFICANT CHANGE UP
POTASSIUM SERPL-MCNC: 3.7 MMOL/L — SIGNIFICANT CHANGE UP (ref 3.5–5.3)
POTASSIUM SERPL-MCNC: 3.7 MMOL/L — SIGNIFICANT CHANGE UP (ref 3.5–5.3)
POTASSIUM SERPL-SCNC: 3.7 MMOL/L — SIGNIFICANT CHANGE UP (ref 3.5–5.3)
POTASSIUM SERPL-SCNC: 3.7 MMOL/L — SIGNIFICANT CHANGE UP (ref 3.5–5.3)
PROT SERPL-MCNC: 5.3 G/DL — LOW (ref 6–8.3)
PROT SERPL-MCNC: 5.3 G/DL — LOW (ref 6–8.3)
RBC # BLD: 3.43 M/UL — LOW (ref 3.8–5.2)
RBC # BLD: 3.43 M/UL — LOW (ref 3.8–5.2)
RBC # FLD: 15.4 % — HIGH (ref 10.3–14.5)
RBC # FLD: 15.4 % — HIGH (ref 10.3–14.5)
RBC BLD AUTO: ABNORMAL
RBC BLD AUTO: ABNORMAL
SODIUM SERPL-SCNC: 144 MMOL/L — SIGNIFICANT CHANGE UP (ref 135–145)
SODIUM SERPL-SCNC: 144 MMOL/L — SIGNIFICANT CHANGE UP (ref 135–145)
WBC # BLD: 3 K/UL — LOW (ref 3.8–10.5)
WBC # BLD: 3 K/UL — LOW (ref 3.8–10.5)
WBC # FLD AUTO: 3 K/UL — LOW (ref 3.8–10.5)
WBC # FLD AUTO: 3 K/UL — LOW (ref 3.8–10.5)

## 2023-12-31 PROCEDURE — 99232 SBSQ HOSP IP/OBS MODERATE 35: CPT

## 2023-12-31 RX ADMIN — ENOXAPARIN SODIUM 40 MILLIGRAM(S): 100 INJECTION SUBCUTANEOUS at 06:00

## 2023-12-31 RX ADMIN — Medication 1 APPLICATION(S): at 13:35

## 2023-12-31 RX ADMIN — SODIUM CHLORIDE 50 MILLILITER(S): 9 INJECTION, SOLUTION INTRAVENOUS at 02:55

## 2023-12-31 RX ADMIN — Medication 3 MILLILITER(S): at 18:26

## 2023-12-31 RX ADMIN — Medication 3 MILLILITER(S): at 06:00

## 2023-12-31 RX ADMIN — Medication 56 MICROGRAM(S): at 21:23

## 2023-12-31 RX ADMIN — Medication 3 MILLILITER(S): at 13:34

## 2023-12-31 RX ADMIN — SODIUM CHLORIDE 4 MILLILITER(S): 9 INJECTION INTRAMUSCULAR; INTRAVENOUS; SUBCUTANEOUS at 06:00

## 2023-12-31 RX ADMIN — PIPERACILLIN AND TAZOBACTAM 25 GRAM(S): 4; .5 INJECTION, POWDER, LYOPHILIZED, FOR SOLUTION INTRAVENOUS at 18:27

## 2023-12-31 RX ADMIN — PIPERACILLIN AND TAZOBACTAM 25 GRAM(S): 4; .5 INJECTION, POWDER, LYOPHILIZED, FOR SOLUTION INTRAVENOUS at 06:00

## 2023-12-31 RX ADMIN — SODIUM CHLORIDE 4 MILLILITER(S): 9 INJECTION INTRAMUSCULAR; INTRAVENOUS; SUBCUTANEOUS at 18:26

## 2023-12-31 NOTE — PROGRESS NOTE ADULT - PROBLEM SELECTOR PLAN 3
Hx of dysphagia since 2-3 months ago per son. Notable for severe b/l ext contracture and cachexia. Low PO intake at baseline, no PO intake in the last 2-3 days. NGT placed 12/22, trickle feeds.   S&S: Puree/moderately thick liquids via 1/2 tsp amount only; however pt unlikely to get adequate calories. Repeat assessment recommend NPO.   NG tube removed during RRT overnight, will hold off on replacing NG tube given concern patient likely aspirated.     - NPO for now , high risk for aspiration  - will consult GI for PEG tube placement next week

## 2023-12-31 NOTE — PROGRESS NOTE ADULT - SUBJECTIVE AND OBJECTIVE BOX
***********************************************  Saeed Springer MD  Internal Medicine   PGY3  ***********************************************      PROGRESS NOTE:     Patient is a 68y old  Female who presents with a chief complaint of No PO intake, lethargy (30 Dec 2023 07:12)      SUBJECTIVE / OVERNIGHT EVENTS:    OVERNIGHT:       Patient examined at bedside        MEDICATIONS  (STANDING):  albuterol/ipratropium for Nebulization 3 milliLiter(s) Nebulizer every 6 hours  ascorbic acid 500 milliGRAM(s) Oral daily  collagenase Ointment 1 Application(s) Topical daily  dextrose 5% + lactated ringers. 1000 milliLiter(s) (50 mL/Hr) IV Continuous <Continuous>  enoxaparin Injectable 40 milliGRAM(s) SubCutaneous every 24 hours  folic acid 1 milliGRAM(s) Oral daily  levothyroxine Injectable 56 MICROGram(s) IV Push at bedtime  multivitamin 1 Tablet(s) Oral daily  piperacillin/tazobactam IVPB.. 3.375 Gram(s) IV Intermittent every 12 hours  polyethylene glycol 3350 17 Gram(s) Oral daily  senna 2 Tablet(s) Oral at bedtime  sertraline 25 milliGRAM(s) Oral daily  sodium chloride 3%  Inhalation 4 milliLiter(s) Inhalation every 12 hours  thiamine 100 milliGRAM(s) Oral daily    MEDICATIONS  (PRN):      CAPILLARY BLOOD GLUCOSE      POCT Blood Glucose.: 116 mg/dL (31 Dec 2023 06:25)  POCT Blood Glucose.: 128 mg/dL (31 Dec 2023 00:08)  POCT Blood Glucose.: 115 mg/dL (30 Dec 2023 17:32)  POCT Blood Glucose.: 128 mg/dL (30 Dec 2023 11:34)    I&O's Summary      PHYSICAL EXAM:  Vital Signs Last 24 Hrs  T(C): 37.1 (31 Dec 2023 04:52), Max: 37.1 (31 Dec 2023 04:52)  T(F): 98.7 (31 Dec 2023 04:52), Max: 98.7 (31 Dec 2023 04:52)  HR: 71 (31 Dec 2023 04:52) (66 - 90)  BP: 115/72 (31 Dec 2023 04:52) (98/62 - 115/72)  BP(mean): --  RR: 18 (31 Dec 2023 04:52) (18 - 20)  SpO2: 100% (31 Dec 2023 04:52) (98% - 100%)    Parameters below as of 31 Dec 2023 04:52  Patient On (Oxygen Delivery Method): nasal cannula        CONSTITUTIONAL: NAD; well-developed  HEENT: PERRL, clear conjunctiva  RESPIRATORY: Normal respiratory effort; lungs are clear to auscultation bilaterally; No Crackles/Rhonchi/Wheezing  CARDIOVASCULAR: Regular rate and rhythm, normal S1 and S2, no murmur/rub/gallop; No lower extremity edema; Peripheral pulses are 2+ bilaterally  ABDOMEN: Nontender to palpation, normoactive bowel sounds, no rebound/guarding; No hepatosplenomegaly  MUSCULOSKELETAL: no clubbing or cyanosis of digits; no joint swelling or tenderness to palpation  EXTREMITY: Lower extremities Non-tender to palpation; non-erythematous B/L  NEURO: A&Ox3; no focal deficits   PSYCH: normal mood; Affect appropirate    LABS:    12-30    143  |  111<H>  |  11  ----------------------------<  131<H>  3.7   |  25  |  0.34<L>    Ca    7.8<L>      30 Dec 2023 13:55  Phos  2.5     12-30  Mg     2.1     12-30            Urinalysis Basic - ( 30 Dec 2023 13:55 )    Color: x / Appearance: x / SG: x / pH: x  Gluc: 131 mg/dL / Ketone: x  / Bili: x / Urobili: x   Blood: x / Protein: x / Nitrite: x   Leuk Esterase: x / RBC: x / WBC x   Sq Epi: x / Non Sq Epi: x / Bacteria: x        Culture - Blood (collected 29 Dec 2023 02:45)  Source: .Blood Blood-Peripheral  Preliminary Report (30 Dec 2023 10:02):    No growth at 24 hours    Culture - Blood (collected 29 Dec 2023 02:45)  Source: .Blood Blood-Peripheral  Preliminary Report (30 Dec 2023 10:02):    No growth at 24 hours    Culture - Urine (collected 28 Dec 2023 12:23)  Source: Catheterized Catheterized  Final Report (29 Dec 2023 13:30):    No growth        RADIOLOGY & ADDITIONAL TESTS:  Results Reviewed:   Imaging Personally Reviewed:  Electrocardiogram Personally Reviewed:    COORDINATION OF CARE:  Care Discussed with Consultants/Other Providers [Y/N]:  Prior or Outpatient Records Reviewed [Y/N]:   ***********************************************  Saede Springer MD  Internal Medicine   PGY3  ***********************************************      PROGRESS NOTE:     Patient is a 68y old  Female who presents with a chief complaint of No PO intake, lethargy (30 Dec 2023 07:12)      SUBJECTIVE / OVERNIGHT EVENTS:    OVERNIGHT:       Patient examined at bedside        MEDICATIONS  (STANDING):  albuterol/ipratropium for Nebulization 3 milliLiter(s) Nebulizer every 6 hours  ascorbic acid 500 milliGRAM(s) Oral daily  collagenase Ointment 1 Application(s) Topical daily  dextrose 5% + lactated ringers. 1000 milliLiter(s) (50 mL/Hr) IV Continuous <Continuous>  enoxaparin Injectable 40 milliGRAM(s) SubCutaneous every 24 hours  folic acid 1 milliGRAM(s) Oral daily  levothyroxine Injectable 56 MICROGram(s) IV Push at bedtime  multivitamin 1 Tablet(s) Oral daily  piperacillin/tazobactam IVPB.. 3.375 Gram(s) IV Intermittent every 12 hours  polyethylene glycol 3350 17 Gram(s) Oral daily  senna 2 Tablet(s) Oral at bedtime  sertraline 25 milliGRAM(s) Oral daily  sodium chloride 3%  Inhalation 4 milliLiter(s) Inhalation every 12 hours  thiamine 100 milliGRAM(s) Oral daily    MEDICATIONS  (PRN):      CAPILLARY BLOOD GLUCOSE      POCT Blood Glucose.: 116 mg/dL (31 Dec 2023 06:25)  POCT Blood Glucose.: 128 mg/dL (31 Dec 2023 00:08)  POCT Blood Glucose.: 115 mg/dL (30 Dec 2023 17:32)  POCT Blood Glucose.: 128 mg/dL (30 Dec 2023 11:34)    I&O's Summary      PHYSICAL EXAM:  Vital Signs Last 24 Hrs  T(C): 37.1 (31 Dec 2023 04:52), Max: 37.1 (31 Dec 2023 04:52)  T(F): 98.7 (31 Dec 2023 04:52), Max: 98.7 (31 Dec 2023 04:52)  HR: 71 (31 Dec 2023 04:52) (66 - 90)  BP: 115/72 (31 Dec 2023 04:52) (98/62 - 115/72)  BP(mean): --  RR: 18 (31 Dec 2023 04:52) (18 - 20)  SpO2: 100% (31 Dec 2023 04:52) (98% - 100%)    Parameters below as of 31 Dec 2023 04:52  Patient On (Oxygen Delivery Method): nasal cannula        CONSTITUTIONAL: NAD; well-developed  HEENT: PERRL, clear conjunctiva  RESPIRATORY: Normal respiratory effort; lungs are clear to auscultation bilaterally; No Crackles/Rhonchi/Wheezing  CARDIOVASCULAR: Regular rate and rhythm, normal S1 and S2, no murmur/rub/gallop; No lower extremity edema; Peripheral pulses are 2+ bilaterally  ABDOMEN: Nontender to palpation, normoactive bowel sounds, no rebound/guarding; No hepatosplenomegaly  MUSCULOSKELETAL: no clubbing or cyanosis of digits; no joint swelling or tenderness to palpation  EXTREMITY: Lower extremities Non-tender to palpation; non-erythematous B/L  NEURO: A&Ox3; no focal deficits   PSYCH: normal mood; Affect appropirate    LABS:    12-30    143  |  111<H>  |  11  ----------------------------<  131<H>  3.7   |  25  |  0.34<L>    Ca    7.8<L>      30 Dec 2023 13:55  Phos  2.5     12-30  Mg     2.1     12-30            Urinalysis Basic - ( 30 Dec 2023 13:55 )    Color: x / Appearance: x / SG: x / pH: x  Gluc: 131 mg/dL / Ketone: x  / Bili: x / Urobili: x   Blood: x / Protein: x / Nitrite: x   Leuk Esterase: x / RBC: x / WBC x   Sq Epi: x / Non Sq Epi: x / Bacteria: x        Culture - Blood (collected 29 Dec 2023 02:45)  Source: .Blood Blood-Peripheral  Preliminary Report (30 Dec 2023 10:02):    No growth at 24 hours    Culture - Blood (collected 29 Dec 2023 02:45)  Source: .Blood Blood-Peripheral  Preliminary Report (30 Dec 2023 10:02):    No growth at 24 hours    Culture - Urine (collected 28 Dec 2023 12:23)  Source: Catheterized Catheterized  Final Report (29 Dec 2023 13:30):    No growth        RADIOLOGY & ADDITIONAL TESTS:  Results Reviewed:   Imaging Personally Reviewed:  Electrocardiogram Personally Reviewed:    COORDINATION OF CARE:  Care Discussed with Consultants/Other Providers [Y/N]:  Prior or Outpatient Records Reviewed [Y/N]:   ***********************************************  Saeed Springer MD  Internal Medicine   PGY3  ***********************************************      PROGRESS NOTE:     Patient is a 68y old  Female who presents with a chief complaint of No PO intake, lethargy (30 Dec 2023 07:12)      SUBJECTIVE / OVERNIGHT EVENTS:    OVERNIGHT:   - No events       Patient examined at bedside AOX0 on room air, awake and alert but does not follow commands. Unable to assess ROS.         MEDICATIONS  (STANDING):  albuterol/ipratropium for Nebulization 3 milliLiter(s) Nebulizer every 6 hours  ascorbic acid 500 milliGRAM(s) Oral daily  collagenase Ointment 1 Application(s) Topical daily  dextrose 5% + lactated ringers. 1000 milliLiter(s) (50 mL/Hr) IV Continuous <Continuous>  enoxaparin Injectable 40 milliGRAM(s) SubCutaneous every 24 hours  folic acid 1 milliGRAM(s) Oral daily  levothyroxine Injectable 56 MICROGram(s) IV Push at bedtime  multivitamin 1 Tablet(s) Oral daily  piperacillin/tazobactam IVPB.. 3.375 Gram(s) IV Intermittent every 12 hours  polyethylene glycol 3350 17 Gram(s) Oral daily  senna 2 Tablet(s) Oral at bedtime  sertraline 25 milliGRAM(s) Oral daily  sodium chloride 3%  Inhalation 4 milliLiter(s) Inhalation every 12 hours  thiamine 100 milliGRAM(s) Oral daily    MEDICATIONS  (PRN):      CAPILLARY BLOOD GLUCOSE      POCT Blood Glucose.: 116 mg/dL (31 Dec 2023 06:25)  POCT Blood Glucose.: 128 mg/dL (31 Dec 2023 00:08)  POCT Blood Glucose.: 115 mg/dL (30 Dec 2023 17:32)  POCT Blood Glucose.: 128 mg/dL (30 Dec 2023 11:34)    I&O's Summary      PHYSICAL EXAM:  Vital Signs Last 24 Hrs  T(C): 37.1 (31 Dec 2023 04:52), Max: 37.1 (31 Dec 2023 04:52)  T(F): 98.7 (31 Dec 2023 04:52), Max: 98.7 (31 Dec 2023 04:52)  HR: 71 (31 Dec 2023 04:52) (66 - 90)  BP: 115/72 (31 Dec 2023 04:52) (98/62 - 115/72)  BP(mean): --  RR: 18 (31 Dec 2023 04:52) (18 - 20)  SpO2: 100% (31 Dec 2023 04:52) (98% - 100%)    Parameters below as of 31 Dec 2023 04:52  Patient On (Oxygen Delivery Method): nasal cannula        GENERAL: NAD, cachetic, chronically ill-appearing   EYES: EOMI, PERRLA, conjunctiva and sclera clear  ENMT: dry mucous membranes  NECK: Supple, trachea midline   NERVOUS SYSTEM:  Alert & Oriented X0,  CHEST/LUNG: rales on lung exam; No rales, rhonchi, wheezing, or rubs  HEART: Regular rate and rhythm; No murmurs, rubs, or gallops  ABDOMEN: Soft, Nontender, Nondistended; Bowel sounds present  EXTREMITIES:  2+ Peripheral Pulses, contracted   LYMPH: No lymphadenopathy noted  SKIN: No rashes or lesions    LABS:    12-30    143  |  111<H>  |  11  ----------------------------<  131<H>  3.7   |  25  |  0.34<L>    Ca    7.8<L>      30 Dec 2023 13:55  Phos  2.5     12-30  Mg     2.1     12-30            Urinalysis Basic - ( 30 Dec 2023 13:55 )    Color: x / Appearance: x / SG: x / pH: x  Gluc: 131 mg/dL / Ketone: x  / Bili: x / Urobili: x   Blood: x / Protein: x / Nitrite: x   Leuk Esterase: x / RBC: x / WBC x   Sq Epi: x / Non Sq Epi: x / Bacteria: x        Culture - Blood (collected 29 Dec 2023 02:45)  Source: .Blood Blood-Peripheral  Preliminary Report (30 Dec 2023 10:02):    No growth at 24 hours    Culture - Blood (collected 29 Dec 2023 02:45)  Source: .Blood Blood-Peripheral  Preliminary Report (30 Dec 2023 10:02):    No growth at 24 hours    Culture - Urine (collected 28 Dec 2023 12:23)  Source: Catheterized Catheterized  Final Report (29 Dec 2023 13:30):    No growth        RADIOLOGY & ADDITIONAL TESTS:  Results Reviewed:   Imaging Personally Reviewed:  Electrocardiogram Personally Reviewed:    COORDINATION OF CARE:  Care Discussed with Consultants/Other Providers [Y/N]:  Prior or Outpatient Records Reviewed [Y/N]:

## 2023-12-31 NOTE — PROGRESS NOTE ADULT - PROBLEM SELECTOR PLAN 1
RRT on 12/28 for hypoxia, currently on HFNC. Suspect likely aspiration pneumonitis. CXR negative for focal consolidations.    - wean o2 as tolerated, currently on RA  - continue zosyn for 5 days  (12/29 - )  - f/u blood cultures

## 2023-12-31 NOTE — PROGRESS NOTE ADULT - ASSESSMENT
68F with PMH significant for breast cancer s/p L mastectomy, TBI (s/p a fall ~2 years ago) c/b aphasia, meningioma, hypothyroidism (hx Grave's dz s/p radioactive iodine), CVA, and dysphagia who presents to [facility] ED on 12/20/2023 with no PO intake and increased lethargy in the last 2-3 days, admitted for hypernatremia 166 and UTI. Hospital course complicated by AHRF likely 2/2 to aspiration pneumonitis.    68F with PMH significant for breast cancer s/p L mastectomy, TBI (s/p a fall ~2 years ago) c/b aphasia, meningioma, hypothyroidism (hx Grave's dz s/p radioactive iodine), CVA, and dysphagia who presents to [facility] ED on 12/20/2023 with no PO intake and increased lethargy in the last 2-3 days, admitted for hypernatremia 166 and UTI. Hospital course complicated by AHRF likely 2/2 to aspiration pneumonitis.

## 2023-12-31 NOTE — PROGRESS NOTE ADULT - PROBLEM SELECTOR PLAN 2
Hx of recurrent UTI (last admission 9/2023). Recent urine culture showing 100k Ecoli. S/p IV ceftriaxone (12/21-23). Febrile overnight 12/27, repeat U/A positive. Restarted on ceftriaxone (12/28-29) but escalated in setting of ongoing aspiration pneumonia.     - now on zosyn

## 2023-12-31 NOTE — PROGRESS NOTE ADULT - PROBLEM SELECTOR PLAN 9
- DVT ppx: Lovenox (40 mg QD; CrCl >30 ml/min) and SCDs  - Diet: NPO for now, pending NG tube   - Dispo: pending further GOC discussions

## 2023-12-31 NOTE — PROGRESS NOTE ADULT - ATTENDING COMMENTS
Hypoxemic respiratory failure 2/2 aspiration pneumonitis vs PNA  Cover with zosyn  Weaned to 40/40 bedside SaO2 97% -> weaned to 1L NC 11/31  Continue to wean O2 as tolerated  Attempt to place NGT but unable due to patient intolerance  Functional quadriplegia with multiple pressure ulcers  Prognosis guarded

## 2024-01-01 DIAGNOSIS — R71.0 PRECIPITOUS DROP IN HEMATOCRIT: ICD-10-CM

## 2024-01-01 LAB
ALBUMIN SERPL ELPH-MCNC: 2.3 G/DL — LOW (ref 3.3–5)
ALBUMIN SERPL ELPH-MCNC: 2.3 G/DL — LOW (ref 3.3–5)
ALP SERPL-CCNC: 47 U/L — SIGNIFICANT CHANGE UP (ref 40–120)
ALP SERPL-CCNC: 47 U/L — SIGNIFICANT CHANGE UP (ref 40–120)
ALT FLD-CCNC: 12 U/L — SIGNIFICANT CHANGE UP (ref 10–45)
ALT FLD-CCNC: 12 U/L — SIGNIFICANT CHANGE UP (ref 10–45)
ANION GAP SERPL CALC-SCNC: 9 MMOL/L — SIGNIFICANT CHANGE UP (ref 5–17)
ANION GAP SERPL CALC-SCNC: 9 MMOL/L — SIGNIFICANT CHANGE UP (ref 5–17)
AST SERPL-CCNC: 10 U/L — SIGNIFICANT CHANGE UP (ref 10–40)
AST SERPL-CCNC: 10 U/L — SIGNIFICANT CHANGE UP (ref 10–40)
BASOPHILS # BLD AUTO: 0 K/UL — SIGNIFICANT CHANGE UP (ref 0–0.2)
BASOPHILS # BLD AUTO: 0 K/UL — SIGNIFICANT CHANGE UP (ref 0–0.2)
BASOPHILS NFR BLD AUTO: 0 % — SIGNIFICANT CHANGE UP (ref 0–2)
BASOPHILS NFR BLD AUTO: 0 % — SIGNIFICANT CHANGE UP (ref 0–2)
BILIRUB SERPL-MCNC: 0.4 MG/DL — SIGNIFICANT CHANGE UP (ref 0.2–1.2)
BILIRUB SERPL-MCNC: 0.4 MG/DL — SIGNIFICANT CHANGE UP (ref 0.2–1.2)
BUN SERPL-MCNC: <4 MG/DL — LOW (ref 7–23)
BUN SERPL-MCNC: <4 MG/DL — LOW (ref 7–23)
CALCIUM SERPL-MCNC: 7.5 MG/DL — LOW (ref 8.4–10.5)
CALCIUM SERPL-MCNC: 7.5 MG/DL — LOW (ref 8.4–10.5)
CHLORIDE SERPL-SCNC: 105 MMOL/L — SIGNIFICANT CHANGE UP (ref 96–108)
CHLORIDE SERPL-SCNC: 105 MMOL/L — SIGNIFICANT CHANGE UP (ref 96–108)
CO2 SERPL-SCNC: 25 MMOL/L — SIGNIFICANT CHANGE UP (ref 22–31)
CO2 SERPL-SCNC: 25 MMOL/L — SIGNIFICANT CHANGE UP (ref 22–31)
CREAT SERPL-MCNC: 0.3 MG/DL — LOW (ref 0.5–1.3)
CREAT SERPL-MCNC: 0.3 MG/DL — LOW (ref 0.5–1.3)
EGFR: 114 ML/MIN/1.73M2 — SIGNIFICANT CHANGE UP
EGFR: 114 ML/MIN/1.73M2 — SIGNIFICANT CHANGE UP
EOSINOPHIL # BLD AUTO: 0.21 K/UL — SIGNIFICANT CHANGE UP (ref 0–0.5)
EOSINOPHIL # BLD AUTO: 0.21 K/UL — SIGNIFICANT CHANGE UP (ref 0–0.5)
EOSINOPHIL NFR BLD AUTO: 5.1 % — SIGNIFICANT CHANGE UP (ref 0–6)
EOSINOPHIL NFR BLD AUTO: 5.1 % — SIGNIFICANT CHANGE UP (ref 0–6)
GLUCOSE BLDC GLUCOMTR-MCNC: 115 MG/DL — HIGH (ref 70–99)
GLUCOSE BLDC GLUCOMTR-MCNC: 115 MG/DL — HIGH (ref 70–99)
GLUCOSE BLDC GLUCOMTR-MCNC: 121 MG/DL — HIGH (ref 70–99)
GLUCOSE BLDC GLUCOMTR-MCNC: 121 MG/DL — HIGH (ref 70–99)
GLUCOSE BLDC GLUCOMTR-MCNC: 126 MG/DL — HIGH (ref 70–99)
GLUCOSE BLDC GLUCOMTR-MCNC: 126 MG/DL — HIGH (ref 70–99)
GLUCOSE BLDC GLUCOMTR-MCNC: 128 MG/DL — HIGH (ref 70–99)
GLUCOSE BLDC GLUCOMTR-MCNC: 128 MG/DL — HIGH (ref 70–99)
GLUCOSE SERPL-MCNC: 158 MG/DL — HIGH (ref 70–99)
GLUCOSE SERPL-MCNC: 158 MG/DL — HIGH (ref 70–99)
HCT VFR BLD CALC: 24.2 % — LOW (ref 34.5–45)
HCT VFR BLD CALC: 24.2 % — LOW (ref 34.5–45)
HCT VFR BLD CALC: 26.4 % — LOW (ref 34.5–45)
HCT VFR BLD CALC: 26.4 % — LOW (ref 34.5–45)
HGB BLD-MCNC: 7.9 G/DL — LOW (ref 11.5–15.5)
HGB BLD-MCNC: 7.9 G/DL — LOW (ref 11.5–15.5)
HGB BLD-MCNC: 8.4 G/DL — LOW (ref 11.5–15.5)
HGB BLD-MCNC: 8.4 G/DL — LOW (ref 11.5–15.5)
LYMPHOCYTES # BLD AUTO: 0.6 K/UL — LOW (ref 1–3.3)
LYMPHOCYTES # BLD AUTO: 0.6 K/UL — LOW (ref 1–3.3)
LYMPHOCYTES # BLD AUTO: 14.5 % — SIGNIFICANT CHANGE UP (ref 13–44)
LYMPHOCYTES # BLD AUTO: 14.5 % — SIGNIFICANT CHANGE UP (ref 13–44)
MAGNESIUM SERPL-MCNC: 2 MG/DL — SIGNIFICANT CHANGE UP (ref 1.6–2.6)
MAGNESIUM SERPL-MCNC: 2 MG/DL — SIGNIFICANT CHANGE UP (ref 1.6–2.6)
MANUAL SMEAR VERIFICATION: SIGNIFICANT CHANGE UP
MANUAL SMEAR VERIFICATION: SIGNIFICANT CHANGE UP
MCHC RBC-ENTMCNC: 26.3 PG — LOW (ref 27–34)
MCHC RBC-ENTMCNC: 26.3 PG — LOW (ref 27–34)
MCHC RBC-ENTMCNC: 27.4 PG — SIGNIFICANT CHANGE UP (ref 27–34)
MCHC RBC-ENTMCNC: 27.4 PG — SIGNIFICANT CHANGE UP (ref 27–34)
MCHC RBC-ENTMCNC: 31.8 GM/DL — LOW (ref 32–36)
MCHC RBC-ENTMCNC: 31.8 GM/DL — LOW (ref 32–36)
MCHC RBC-ENTMCNC: 32.6 GM/DL — SIGNIFICANT CHANGE UP (ref 32–36)
MCHC RBC-ENTMCNC: 32.6 GM/DL — SIGNIFICANT CHANGE UP (ref 32–36)
MCV RBC AUTO: 82.5 FL — SIGNIFICANT CHANGE UP (ref 80–100)
MCV RBC AUTO: 82.5 FL — SIGNIFICANT CHANGE UP (ref 80–100)
MCV RBC AUTO: 84 FL — SIGNIFICANT CHANGE UP (ref 80–100)
MCV RBC AUTO: 84 FL — SIGNIFICANT CHANGE UP (ref 80–100)
MONOCYTES # BLD AUTO: 0.21 K/UL — SIGNIFICANT CHANGE UP (ref 0–0.9)
MONOCYTES # BLD AUTO: 0.21 K/UL — SIGNIFICANT CHANGE UP (ref 0–0.9)
MONOCYTES NFR BLD AUTO: 5.1 % — SIGNIFICANT CHANGE UP (ref 2–14)
MONOCYTES NFR BLD AUTO: 5.1 % — SIGNIFICANT CHANGE UP (ref 2–14)
NEUTROPHILS # BLD AUTO: 3.06 K/UL — SIGNIFICANT CHANGE UP (ref 1.8–7.4)
NEUTROPHILS # BLD AUTO: 3.06 K/UL — SIGNIFICANT CHANGE UP (ref 1.8–7.4)
NEUTROPHILS NFR BLD AUTO: 73.5 % — SIGNIFICANT CHANGE UP (ref 43–77)
NEUTROPHILS NFR BLD AUTO: 73.5 % — SIGNIFICANT CHANGE UP (ref 43–77)
NEUTS BAND # BLD: 0.9 % — SIGNIFICANT CHANGE UP (ref 0–8)
NEUTS BAND # BLD: 0.9 % — SIGNIFICANT CHANGE UP (ref 0–8)
NRBC # BLD: 0 /100 WBCS — SIGNIFICANT CHANGE UP (ref 0–0)
NRBC # BLD: 0 /100 WBCS — SIGNIFICANT CHANGE UP (ref 0–0)
PHOSPHATE SERPL-MCNC: 2.3 MG/DL — LOW (ref 2.5–4.5)
PHOSPHATE SERPL-MCNC: 2.3 MG/DL — LOW (ref 2.5–4.5)
PLAT MORPH BLD: NORMAL — SIGNIFICANT CHANGE UP
PLAT MORPH BLD: NORMAL — SIGNIFICANT CHANGE UP
PLATELET # BLD AUTO: 212 K/UL — SIGNIFICANT CHANGE UP (ref 150–400)
PLATELET # BLD AUTO: 212 K/UL — SIGNIFICANT CHANGE UP (ref 150–400)
PLATELET # BLD AUTO: 232 K/UL — SIGNIFICANT CHANGE UP (ref 150–400)
PLATELET # BLD AUTO: 232 K/UL — SIGNIFICANT CHANGE UP (ref 150–400)
POTASSIUM SERPL-MCNC: 3.2 MMOL/L — LOW (ref 3.5–5.3)
POTASSIUM SERPL-MCNC: 3.2 MMOL/L — LOW (ref 3.5–5.3)
POTASSIUM SERPL-SCNC: 3.2 MMOL/L — LOW (ref 3.5–5.3)
POTASSIUM SERPL-SCNC: 3.2 MMOL/L — LOW (ref 3.5–5.3)
PROT SERPL-MCNC: 4.9 G/DL — LOW (ref 6–8.3)
PROT SERPL-MCNC: 4.9 G/DL — LOW (ref 6–8.3)
RBC # BLD: 2.88 M/UL — LOW (ref 3.8–5.2)
RBC # BLD: 2.88 M/UL — LOW (ref 3.8–5.2)
RBC # BLD: 3.2 M/UL — LOW (ref 3.8–5.2)
RBC # BLD: 3.2 M/UL — LOW (ref 3.8–5.2)
RBC # FLD: 15.1 % — HIGH (ref 10.3–14.5)
RBC # FLD: 15.1 % — HIGH (ref 10.3–14.5)
RBC # FLD: 15.2 % — HIGH (ref 10.3–14.5)
RBC # FLD: 15.2 % — HIGH (ref 10.3–14.5)
RBC BLD AUTO: SIGNIFICANT CHANGE UP
RBC BLD AUTO: SIGNIFICANT CHANGE UP
SODIUM SERPL-SCNC: 139 MMOL/L — SIGNIFICANT CHANGE UP (ref 135–145)
SODIUM SERPL-SCNC: 139 MMOL/L — SIGNIFICANT CHANGE UP (ref 135–145)
VARIANT LYMPHS # BLD: 0.9 % — SIGNIFICANT CHANGE UP (ref 0–6)
VARIANT LYMPHS # BLD: 0.9 % — SIGNIFICANT CHANGE UP (ref 0–6)
WBC # BLD: 4.11 K/UL — SIGNIFICANT CHANGE UP (ref 3.8–10.5)
WBC # BLD: 4.11 K/UL — SIGNIFICANT CHANGE UP (ref 3.8–10.5)
WBC # BLD: 4.9 K/UL — SIGNIFICANT CHANGE UP (ref 3.8–10.5)
WBC # BLD: 4.9 K/UL — SIGNIFICANT CHANGE UP (ref 3.8–10.5)
WBC # FLD AUTO: 4.11 K/UL — SIGNIFICANT CHANGE UP (ref 3.8–10.5)
WBC # FLD AUTO: 4.11 K/UL — SIGNIFICANT CHANGE UP (ref 3.8–10.5)
WBC # FLD AUTO: 4.9 K/UL — SIGNIFICANT CHANGE UP (ref 3.8–10.5)
WBC # FLD AUTO: 4.9 K/UL — SIGNIFICANT CHANGE UP (ref 3.8–10.5)

## 2024-01-01 PROCEDURE — 71260 CT THORAX DX C+: CPT | Mod: 26

## 2024-01-01 PROCEDURE — 74177 CT ABD & PELVIS W/CONTRAST: CPT | Mod: 26

## 2024-01-01 PROCEDURE — 99233 SBSQ HOSP IP/OBS HIGH 50: CPT

## 2024-01-01 RX ORDER — PANTOPRAZOLE SODIUM 20 MG/1
40 TABLET, DELAYED RELEASE ORAL
Refills: 0 | Status: DISCONTINUED | OUTPATIENT
Start: 2024-01-01 | End: 2024-01-04

## 2024-01-01 RX ORDER — PANTOPRAZOLE SODIUM 20 MG/1
80 TABLET, DELAYED RELEASE ORAL ONCE
Refills: 0 | Status: COMPLETED | OUTPATIENT
Start: 2024-01-01 | End: 2024-01-01

## 2024-01-01 RX ORDER — POTASSIUM CHLORIDE 20 MEQ
10 PACKET (EA) ORAL
Refills: 0 | Status: COMPLETED | OUTPATIENT
Start: 2024-01-01 | End: 2024-01-01

## 2024-01-01 RX ADMIN — PIPERACILLIN AND TAZOBACTAM 25 GRAM(S): 4; .5 INJECTION, POWDER, LYOPHILIZED, FOR SOLUTION INTRAVENOUS at 05:38

## 2024-01-01 RX ADMIN — Medication 3 MILLILITER(S): at 12:47

## 2024-01-01 RX ADMIN — Medication 100 MILLIEQUIVALENT(S): at 16:18

## 2024-01-01 RX ADMIN — Medication 100 MILLIEQUIVALENT(S): at 10:01

## 2024-01-01 RX ADMIN — SODIUM CHLORIDE 50 MILLILITER(S): 9 INJECTION, SOLUTION INTRAVENOUS at 10:04

## 2024-01-01 RX ADMIN — Medication 3 MILLILITER(S): at 17:19

## 2024-01-01 RX ADMIN — SODIUM CHLORIDE 4 MILLILITER(S): 9 INJECTION INTRAMUSCULAR; INTRAVENOUS; SUBCUTANEOUS at 17:19

## 2024-01-01 RX ADMIN — PANTOPRAZOLE SODIUM 80 MILLIGRAM(S): 20 TABLET, DELAYED RELEASE ORAL at 16:18

## 2024-01-01 RX ADMIN — Medication 100 MILLIEQUIVALENT(S): at 14:17

## 2024-01-01 RX ADMIN — SODIUM CHLORIDE 4 MILLILITER(S): 9 INJECTION INTRAMUSCULAR; INTRAVENOUS; SUBCUTANEOUS at 05:39

## 2024-01-01 RX ADMIN — Medication 3 MILLILITER(S): at 00:50

## 2024-01-01 RX ADMIN — Medication 3 MILLILITER(S): at 05:38

## 2024-01-01 RX ADMIN — ENOXAPARIN SODIUM 40 MILLIGRAM(S): 100 INJECTION SUBCUTANEOUS at 07:02

## 2024-01-01 RX ADMIN — SODIUM CHLORIDE 50 MILLILITER(S): 9 INJECTION, SOLUTION INTRAVENOUS at 05:39

## 2024-01-01 RX ADMIN — PIPERACILLIN AND TAZOBACTAM 25 GRAM(S): 4; .5 INJECTION, POWDER, LYOPHILIZED, FOR SOLUTION INTRAVENOUS at 17:22

## 2024-01-01 RX ADMIN — Medication 56 MICROGRAM(S): at 21:12

## 2024-01-01 NOTE — PROGRESS NOTE ADULT - ATTENDING COMMENTS
Hypoxemic respiratory failure 2/2 aspiration pneumonitis vs PNA  Cover with zosyn  Weaned to 40/40 bedside SaO2 97% -> weaned to 1L NC 11/31 -> doing well on RA 1/1  Attempt to place NGT but unable due to patient intolerance  Now with anemia; suspect acute blood loss but no overt signs  Maintain active T&S, CBC q8, transfuse hgb > 7  CTAP to r/o RP bleed as there are no overt signs of bleeding (no hematochezia or BM reported by nursing staff)  Hold chemical DVT ppx and start protonix 40 bid until upper GI bleed ruled out  Functional quadriplegia with multiple pressure ulcers  Prognosis guarded

## 2024-01-01 NOTE — PROGRESS NOTE ADULT - PROBLEM SELECTOR PLAN 3
Hx of dysphagia since 2-3 months ago per son. Notable for severe b/l ext contracture and cachexia. Low PO intake at baseline, no PO intake in the last 2-3 days. NGT placed 12/22, trickle feeds.   S&S: Puree/moderately thick liquids via 1/2 tsp amount only; however pt unlikely to get adequate calories. Repeat assessment recommend NPO.   NG tube removed during RRT overnight, will hold off on replacing NG tube given concern patient likely aspirated.     - NPO for now , high risk for aspiration  - will consult GI for PEG tube placement next week -Patient is anemic chronically, with recent Hgb drop  -Repeat CBC showing stable Hgb  -Ordered CT C/A/P. will Follow

## 2024-01-01 NOTE — PROGRESS NOTE ADULT - SUBJECTIVE AND OBJECTIVE BOX
*******************************  Pb Hollis, PGY-1  Internal Medicine  Contact via [employer] TEAMS    *******************************    PROGRESS NOTE:     Patient is a 68y old  Female who presents with a chief complaint of No PO intake, lethargy (31 Dec 2023 07:17)      INTERVAL EVENTS: No acute overnight events.     SUBJECTIVE: Patient seen and examined at bedside. This morning, the patient is comfortable and doing well. No acute complaints. Denies fevers, chills, N/V/D, chest pain, SOB, abdominal pain.    MEDICATIONS  (STANDING):  albuterol/ipratropium for Nebulization 3 milliLiter(s) Nebulizer every 6 hours  ascorbic acid 500 milliGRAM(s) Oral daily  collagenase Ointment 1 Application(s) Topical daily  dextrose 5% + lactated ringers. 1000 milliLiter(s) (50 mL/Hr) IV Continuous <Continuous>  enoxaparin Injectable 40 milliGRAM(s) SubCutaneous every 24 hours  folic acid 1 milliGRAM(s) Oral daily  levothyroxine Injectable 56 MICROGram(s) IV Push at bedtime  multivitamin 1 Tablet(s) Oral daily  piperacillin/tazobactam IVPB.. 3.375 Gram(s) IV Intermittent every 12 hours  polyethylene glycol 3350 17 Gram(s) Oral daily  senna 2 Tablet(s) Oral at bedtime  sertraline 25 milliGRAM(s) Oral daily  sodium chloride 3%  Inhalation 4 milliLiter(s) Inhalation every 12 hours  thiamine 100 milliGRAM(s) Oral daily    MEDICATIONS  (PRN):      CAPILLARY BLOOD GLUCOSE      POCT Blood Glucose.: 121 mg/dL (01 Jan 2024 06:34)  POCT Blood Glucose.: 115 mg/dL (01 Jan 2024 00:53)  POCT Blood Glucose.: 115 mg/dL (31 Dec 2023 12:25)    I&O's Summary    31 Dec 2023 07:01  -  01 Jan 2024 07:00  --------------------------------------------------------  IN: 0 mL / OUT: 500 mL / NET: -500 mL        PHYSICAL EXAM:  Vital Signs Last 24 Hrs  T(C): 36.3 (01 Jan 2024 06:09), Max: 36.6 (31 Dec 2023 11:10)  T(F): 97.4 (01 Jan 2024 06:09), Max: 97.8 (31 Dec 2023 11:10)  HR: 72 (01 Jan 2024 06:09) (67 - 72)  BP: 98/60 (01 Jan 2024 06:09) (94/61 - 140/70)  BP(mean): --  RR: 18 (01 Jan 2024 06:09) (18 - 18)  SpO2: 100% (01 Jan 2024 06:09) (97% - 100%)    Parameters below as of 01 Jan 2024 06:09  Patient On (Oxygen Delivery Method): nasal cannula  O2 Flow (L/min): 1      GENERAL: NAD, lying in bed comfortably  HEAD: Atraumatic, normocephalic  EYES: EOMI, PERRLA, conjunctiva and sclera clear  ENT: Moist mucous membranes  NECK: Supple, no JVD  HEART: S1, S2, Regular rate and rhythm, no murmurs, rubs, or gallops  LUNGS: Unlabored respirations, clear to auscultation bilaterally, no crackles, wheezing, or rhonchi  ABDOMEN: Soft, nontender, nondistended, +BS  EXTREMITIES: 2+ peripheral pulses bilaterally. No clubbing, cyanosis, or edema  NERVOUS SYSTEM:  A&Ox3, no focal deficits   SKIN: No rashes or lesions    LABS:                        9.3    3.00  )-----------( 173      ( 31 Dec 2023 07:31 )             29.2     12-31    144  |  109<H>  |  7   ----------------------------<  114<H>  3.7   |  25  |  0.31<L>    Ca    8.2<L>      31 Dec 2023 07:31  Phos  2.5     12-31  Mg     2.2     12-31    TPro  5.3<L>  /  Alb  2.1<L>  /  TBili  0.4  /  DBili  x   /  AST  14  /  ALT  15  /  AlkPhos  53  12-31          Urinalysis Basic - ( 31 Dec 2023 07:31 )    Color: x / Appearance: x / SG: x / pH: x  Gluc: 114 mg/dL / Ketone: x  / Bili: x / Urobili: x   Blood: x / Protein: x / Nitrite: x   Leuk Esterase: x / RBC: x / WBC x   Sq Epi: x / Non Sq Epi: x / Bacteria: x          RADIOLOGY & ADDITIONAL TESTS:  Results Reviewed:   Imaging Personally Reviewed:  Electrocardiogram Personally Reviewed:  Tele: *******************************  Pb Hollis, PGY-1  Internal Medicine  Contact via [employer] TEAMS    *******************************    PROGRESS NOTE:     Patient is a 68y old  Female who presents with a chief complaint of No PO intake, lethargy (31 Dec 2023 07:17)      INTERVAL EVENTS: No acute overnight events.     SUBJECTIVE: Patient seen and examined at bedside. This morning, the patient continues to be non-verbal but is able to follow basic commands.     MEDICATIONS  (STANDING):  albuterol/ipratropium for Nebulization 3 milliLiter(s) Nebulizer every 6 hours  ascorbic acid 500 milliGRAM(s) Oral daily  collagenase Ointment 1 Application(s) Topical daily  dextrose 5% + lactated ringers. 1000 milliLiter(s) (50 mL/Hr) IV Continuous <Continuous>  enoxaparin Injectable 40 milliGRAM(s) SubCutaneous every 24 hours  folic acid 1 milliGRAM(s) Oral daily  levothyroxine Injectable 56 MICROGram(s) IV Push at bedtime  multivitamin 1 Tablet(s) Oral daily  piperacillin/tazobactam IVPB.. 3.375 Gram(s) IV Intermittent every 12 hours  polyethylene glycol 3350 17 Gram(s) Oral daily  senna 2 Tablet(s) Oral at bedtime  sertraline 25 milliGRAM(s) Oral daily  sodium chloride 3%  Inhalation 4 milliLiter(s) Inhalation every 12 hours  thiamine 100 milliGRAM(s) Oral daily    MEDICATIONS  (PRN):      CAPILLARY BLOOD GLUCOSE      POCT Blood Glucose.: 121 mg/dL (01 Jan 2024 06:34)  POCT Blood Glucose.: 115 mg/dL (01 Jan 2024 00:53)  POCT Blood Glucose.: 115 mg/dL (31 Dec 2023 12:25)    I&O's Summary    31 Dec 2023 07:01  -  01 Jan 2024 07:00  --------------------------------------------------------  IN: 0 mL / OUT: 500 mL / NET: -500 mL        PHYSICAL EXAM:  Vital Signs Last 24 Hrs  T(C): 36.3 (01 Jan 2024 06:09), Max: 36.6 (31 Dec 2023 11:10)  T(F): 97.4 (01 Jan 2024 06:09), Max: 97.8 (31 Dec 2023 11:10)  HR: 72 (01 Jan 2024 06:09) (67 - 72)  BP: 98/60 (01 Jan 2024 06:09) (94/61 - 140/70)  BP(mean): --  RR: 18 (01 Jan 2024 06:09) (18 - 18)  SpO2: 100% (01 Jan 2024 06:09) (97% - 100%)    Parameters below as of 01 Jan 2024 06:09  Patient On (Oxygen Delivery Method): nasal cannula  O2 Flow (L/min): 1      GENERAL: NAD, lying in bed comfortably  HEAD: Atraumatic, normocephalic  EYES: EOMI, conjunctiva and sclera clear  ENT: Moist mucous membranes  NECK: Supple, no JVD  HEART: S1, S2, Regular rate and rhythm, no murmurs, rubs, or gallops  LUNGS: Unlabored respirations, Mild coarse breath sounds at bilateral bases  ABDOMEN: Soft, nontender, nondistended  EXTREMITIES: No LE edema  NERVOUS SYSTEM:  A&O x0, nonverbal at baseline  SKIN: No rashes or lesions    LABS:                        9.3    3.00  )-----------( 173      ( 31 Dec 2023 07:31 )             29.2     12-31    144  |  109<H>  |  7   ----------------------------<  114<H>  3.7   |  25  |  0.31<L>    Ca    8.2<L>      31 Dec 2023 07:31  Phos  2.5     12-31  Mg     2.2     12-31    TPro  5.3<L>  /  Alb  2.1<L>  /  TBili  0.4  /  DBili  x   /  AST  14  /  ALT  15  /  AlkPhos  53  12-31          Urinalysis Basic - ( 31 Dec 2023 07:31 )    Color: x / Appearance: x / SG: x / pH: x  Gluc: 114 mg/dL / Ketone: x  / Bili: x / Urobili: x   Blood: x / Protein: x / Nitrite: x   Leuk Esterase: x / RBC: x / WBC x   Sq Epi: x / Non Sq Epi: x / Bacteria: x          RADIOLOGY & ADDITIONAL TESTS:  Results Reviewed:   Imaging Personally Reviewed:  Electrocardiogram Personally Reviewed:  Tele:

## 2024-01-01 NOTE — PROGRESS NOTE ADULT - ASSESSMENT
68F with PMH significant for breast cancer s/p L mastectomy, TBI (s/p a fall ~2 years ago) c/b aphasia, meningioma, hypothyroidism (hx Grave's dz s/p radioactive iodine), CVA, and dysphagia who presents to [facility] ED on 12/20/2023 with no PO intake and increased lethargy in the last 2-3 days, admitted for hypernatremia 166 and UTI. Hospital course complicated by AHRF likely 2/2 to aspiration pneumonitis.    68F with PMH significant for breast cancer s/p L mastectomy, TBI (s/p a fall ~2 years ago) c/b aphasia, meningioma, hypothyroidism (hx Grave's dz s/p radioactive iodine), CVA, and dysphagia who presents to [facility] ED on 12/20/2023 with no PO intake and increased lethargy in the last 2-3 days, admitted for hypernatremia 166 and UTI. Hospital course complicated by AHRF likely 2/2 to aspiration pneumonitis.

## 2024-01-01 NOTE — PROGRESS NOTE ADULT - NUTRITIONAL ASSESSMENT
This patient has been assessed with a concern for Malnutrition and has been determined to have a diagnosis/diagnoses of Severe protein-calorie malnutrition and Underweight (BMI < 19).    This patient is being managed with:   Diet NPO with Tube Feed-  Tube Feeding Modality: Nasogastric  Glucerna 1.2 Vidal (GLUCERNARTH)  Total Volume for 24 Hours (mL): 1320  Continuous  Starting Tube Feed Rate {mL per Hour}: 55  Increase Tube Feed Rate by (mL): 0     Every 6 hours  Until Goal Tube Feed Rate (mL per Hour): 55  Tube Feed Duration (in Hours): 24  Tube Feed Start Time: 00:00  Carlso(7 Gm Arginine/7 Gm Glut/1.2 Gm HMB     Qty per Day:  2  Entered: Dec 27 2023  8:36AM   This patient has been assessed with a concern for Malnutrition and has been determined to have a diagnosis/diagnoses of Severe protein-calorie malnutrition and Underweight (BMI < 19).    This patient is being managed with:   Diet NPO with Tube Feed-  Tube Feeding Modality: Nasogastric  Glucerna 1.2 Vidal (GLUCERNARTH)  Total Volume for 24 Hours (mL): 1320  Continuous  Starting Tube Feed Rate {mL per Hour}: 55  Increase Tube Feed Rate by (mL): 0     Every 6 hours  Until Goal Tube Feed Rate (mL per Hour): 55  Tube Feed Duration (in Hours): 24  Tube Feed Start Time: 00:00  Carlos(7 Gm Arginine/7 Gm Glut/1.2 Gm HMB     Qty per Day:  2  Entered: Dec 27 2023  8:36AM

## 2024-01-01 NOTE — PROGRESS NOTE ADULT - PROBLEM SELECTOR PLAN 10
- DVT ppx: Lovenox stopped due to concern for bleed, SCDs in place  - Diet: NPO for now, pending NG tube   - Dispo: pending further GOC discussions

## 2024-01-01 NOTE — PROGRESS NOTE ADULT - PROBLEM SELECTOR PLAN 4
Symptoms of lethargy/encephalopathy >48h with no PO intake, likely has chronic hypernatremia (vs acute) 2/2 severe dehydration. Na 166 on admission, improved w/ D5.   Resolved     - monitor BMP daily Hx of dysphagia since 2-3 months ago per son. Notable for severe b/l ext contracture and cachexia. Low PO intake at baseline, no PO intake in the last 2-3 days. NGT placed 12/22, trickle feeds.   S&S: Puree/moderately thick liquids via 1/2 tsp amount only; however pt unlikely to get adequate calories. Repeat assessment recommend NPO.   NG tube removed during RRT overnight, will hold off on replacing NG tube given concern patient likely aspirated.     - NPO for now , high risk for aspiration  - Will re-evaluate daily for NGT placement, not placed today due to concern for bleeding  - will consult GI for PEG tube placement next week

## 2024-01-01 NOTE — PROGRESS NOTE ADULT - PROBLEM SELECTOR PLAN 9
- DVT ppx: Lovenox (40 mg QD; CrCl >30 ml/min) and SCDs  - Diet: NPO for now, pending NG tube   - Dispo: pending further GOC discussions Hx of Grave's disease s/p radioactive iodine  - resumed home levothyroxine in IV formulation

## 2024-01-01 NOTE — PROGRESS NOTE ADULT - PROBLEM SELECTOR PLAN 5
P/w progressively worsening lethargy/encephalopathy i/s/o no PO intake for 2-3 days; at baseline, answers simple questions (Y/N, name) per son. Most likely ddx include metabolic (hypernatremia) vs infection (c/f UTI) vs mixed    - Currently AOx0, non-verbal, opens eyes to voice, unable to follow commands  - chronic hypernatremia resolved but likely ongoing infectious etiology Symptoms of lethargy/encephalopathy >48h with no PO intake, likely has chronic hypernatremia (vs acute) 2/2 severe dehydration. Na 166 on admission, improved w/ D5.   Resolved     - monitor BMP daily

## 2024-01-02 LAB
ALBUMIN SERPL ELPH-MCNC: 1.8 G/DL — LOW (ref 3.3–5)
ALBUMIN SERPL ELPH-MCNC: 1.8 G/DL — LOW (ref 3.3–5)
ALP SERPL-CCNC: 40 U/L — SIGNIFICANT CHANGE UP (ref 40–120)
ALP SERPL-CCNC: 40 U/L — SIGNIFICANT CHANGE UP (ref 40–120)
ALT FLD-CCNC: 10 U/L — SIGNIFICANT CHANGE UP (ref 10–45)
ALT FLD-CCNC: 10 U/L — SIGNIFICANT CHANGE UP (ref 10–45)
ANION GAP SERPL CALC-SCNC: 13 MMOL/L — SIGNIFICANT CHANGE UP (ref 5–17)
ANION GAP SERPL CALC-SCNC: 13 MMOL/L — SIGNIFICANT CHANGE UP (ref 5–17)
APTT BLD: 25.2 SEC — SIGNIFICANT CHANGE UP (ref 24.5–35.6)
APTT BLD: 25.2 SEC — SIGNIFICANT CHANGE UP (ref 24.5–35.6)
AST SERPL-CCNC: 7 U/L — LOW (ref 10–40)
AST SERPL-CCNC: 7 U/L — LOW (ref 10–40)
BASOPHILS # BLD AUTO: 0.01 K/UL — SIGNIFICANT CHANGE UP (ref 0–0.2)
BASOPHILS # BLD AUTO: 0.01 K/UL — SIGNIFICANT CHANGE UP (ref 0–0.2)
BASOPHILS NFR BLD AUTO: 0.2 % — SIGNIFICANT CHANGE UP (ref 0–2)
BASOPHILS NFR BLD AUTO: 0.2 % — SIGNIFICANT CHANGE UP (ref 0–2)
BILIRUB SERPL-MCNC: 0.3 MG/DL — SIGNIFICANT CHANGE UP (ref 0.2–1.2)
BILIRUB SERPL-MCNC: 0.3 MG/DL — SIGNIFICANT CHANGE UP (ref 0.2–1.2)
BUN SERPL-MCNC: <4 MG/DL — LOW (ref 7–23)
BUN SERPL-MCNC: <4 MG/DL — LOW (ref 7–23)
CALCIUM SERPL-MCNC: 7.3 MG/DL — LOW (ref 8.4–10.5)
CALCIUM SERPL-MCNC: 7.3 MG/DL — LOW (ref 8.4–10.5)
CHLORIDE SERPL-SCNC: 105 MMOL/L — SIGNIFICANT CHANGE UP (ref 96–108)
CHLORIDE SERPL-SCNC: 105 MMOL/L — SIGNIFICANT CHANGE UP (ref 96–108)
CO2 SERPL-SCNC: 20 MMOL/L — LOW (ref 22–31)
CO2 SERPL-SCNC: 20 MMOL/L — LOW (ref 22–31)
CREAT SERPL-MCNC: <0.3 MG/DL — LOW (ref 0.5–1.3)
CREAT SERPL-MCNC: <0.3 MG/DL — LOW (ref 0.5–1.3)
CULTURE RESULTS: SIGNIFICANT CHANGE UP
CULTURE RESULTS: SIGNIFICANT CHANGE UP
EGFR: 114 ML/MIN/1.73M2 — SIGNIFICANT CHANGE UP
EGFR: 114 ML/MIN/1.73M2 — SIGNIFICANT CHANGE UP
EOSINOPHIL # BLD AUTO: 0.03 K/UL — SIGNIFICANT CHANGE UP (ref 0–0.5)
EOSINOPHIL # BLD AUTO: 0.03 K/UL — SIGNIFICANT CHANGE UP (ref 0–0.5)
EOSINOPHIL NFR BLD AUTO: 0.6 % — SIGNIFICANT CHANGE UP (ref 0–6)
EOSINOPHIL NFR BLD AUTO: 0.6 % — SIGNIFICANT CHANGE UP (ref 0–6)
GLUCOSE BLDC GLUCOMTR-MCNC: 104 MG/DL — HIGH (ref 70–99)
GLUCOSE BLDC GLUCOMTR-MCNC: 104 MG/DL — HIGH (ref 70–99)
GLUCOSE BLDC GLUCOMTR-MCNC: 110 MG/DL — HIGH (ref 70–99)
GLUCOSE BLDC GLUCOMTR-MCNC: 110 MG/DL — HIGH (ref 70–99)
GLUCOSE BLDC GLUCOMTR-MCNC: 114 MG/DL — HIGH (ref 70–99)
GLUCOSE BLDC GLUCOMTR-MCNC: 114 MG/DL — HIGH (ref 70–99)
GLUCOSE BLDC GLUCOMTR-MCNC: 83 MG/DL — SIGNIFICANT CHANGE UP (ref 70–99)
GLUCOSE BLDC GLUCOMTR-MCNC: 83 MG/DL — SIGNIFICANT CHANGE UP (ref 70–99)
GLUCOSE BLDC GLUCOMTR-MCNC: 92 MG/DL — SIGNIFICANT CHANGE UP (ref 70–99)
GLUCOSE BLDC GLUCOMTR-MCNC: 92 MG/DL — SIGNIFICANT CHANGE UP (ref 70–99)
GLUCOSE SERPL-MCNC: 1098 MG/DL — CRITICAL HIGH (ref 70–99)
GLUCOSE SERPL-MCNC: 1098 MG/DL — CRITICAL HIGH (ref 70–99)
HCT VFR BLD CALC: 25.7 % — LOW (ref 34.5–45)
HCT VFR BLD CALC: 25.7 % — LOW (ref 34.5–45)
HGB BLD-MCNC: 8.2 G/DL — LOW (ref 11.5–15.5)
HGB BLD-MCNC: 8.2 G/DL — LOW (ref 11.5–15.5)
IMM GRANULOCYTES NFR BLD AUTO: 0.6 % — SIGNIFICANT CHANGE UP (ref 0–0.9)
IMM GRANULOCYTES NFR BLD AUTO: 0.6 % — SIGNIFICANT CHANGE UP (ref 0–0.9)
INR BLD: 1.17 RATIO — SIGNIFICANT CHANGE UP (ref 0.85–1.18)
INR BLD: 1.17 RATIO — SIGNIFICANT CHANGE UP (ref 0.85–1.18)
LYMPHOCYTES # BLD AUTO: 0.76 K/UL — LOW (ref 1–3.3)
LYMPHOCYTES # BLD AUTO: 0.76 K/UL — LOW (ref 1–3.3)
LYMPHOCYTES # BLD AUTO: 14.9 % — SIGNIFICANT CHANGE UP (ref 13–44)
LYMPHOCYTES # BLD AUTO: 14.9 % — SIGNIFICANT CHANGE UP (ref 13–44)
MAGNESIUM SERPL-MCNC: 1.5 MG/DL — LOW (ref 1.6–2.6)
MAGNESIUM SERPL-MCNC: 1.5 MG/DL — LOW (ref 1.6–2.6)
MCHC RBC-ENTMCNC: 27.2 PG — SIGNIFICANT CHANGE UP (ref 27–34)
MCHC RBC-ENTMCNC: 27.2 PG — SIGNIFICANT CHANGE UP (ref 27–34)
MCHC RBC-ENTMCNC: 31.9 GM/DL — LOW (ref 32–36)
MCHC RBC-ENTMCNC: 31.9 GM/DL — LOW (ref 32–36)
MCV RBC AUTO: 85.4 FL — SIGNIFICANT CHANGE UP (ref 80–100)
MCV RBC AUTO: 85.4 FL — SIGNIFICANT CHANGE UP (ref 80–100)
MONOCYTES # BLD AUTO: 0.23 K/UL — SIGNIFICANT CHANGE UP (ref 0–0.9)
MONOCYTES # BLD AUTO: 0.23 K/UL — SIGNIFICANT CHANGE UP (ref 0–0.9)
MONOCYTES NFR BLD AUTO: 4.5 % — SIGNIFICANT CHANGE UP (ref 2–14)
MONOCYTES NFR BLD AUTO: 4.5 % — SIGNIFICANT CHANGE UP (ref 2–14)
NEUTROPHILS # BLD AUTO: 4.05 K/UL — SIGNIFICANT CHANGE UP (ref 1.8–7.4)
NEUTROPHILS # BLD AUTO: 4.05 K/UL — SIGNIFICANT CHANGE UP (ref 1.8–7.4)
NEUTROPHILS NFR BLD AUTO: 79.2 % — HIGH (ref 43–77)
NEUTROPHILS NFR BLD AUTO: 79.2 % — HIGH (ref 43–77)
NRBC # BLD: 0 /100 WBCS — SIGNIFICANT CHANGE UP (ref 0–0)
NRBC # BLD: 0 /100 WBCS — SIGNIFICANT CHANGE UP (ref 0–0)
PHOSPHATE SERPL-MCNC: 1.6 MG/DL — LOW (ref 2.5–4.5)
PHOSPHATE SERPL-MCNC: 1.6 MG/DL — LOW (ref 2.5–4.5)
PLATELET # BLD AUTO: 227 K/UL — SIGNIFICANT CHANGE UP (ref 150–400)
PLATELET # BLD AUTO: 227 K/UL — SIGNIFICANT CHANGE UP (ref 150–400)
POTASSIUM SERPL-MCNC: 3.7 MMOL/L — SIGNIFICANT CHANGE UP (ref 3.5–5.3)
POTASSIUM SERPL-MCNC: 3.7 MMOL/L — SIGNIFICANT CHANGE UP (ref 3.5–5.3)
POTASSIUM SERPL-SCNC: 3.7 MMOL/L — SIGNIFICANT CHANGE UP (ref 3.5–5.3)
POTASSIUM SERPL-SCNC: 3.7 MMOL/L — SIGNIFICANT CHANGE UP (ref 3.5–5.3)
PROT SERPL-MCNC: 3.9 G/DL — LOW (ref 6–8.3)
PROT SERPL-MCNC: 3.9 G/DL — LOW (ref 6–8.3)
PROTHROM AB SERPL-ACNC: 12.8 SEC — SIGNIFICANT CHANGE UP (ref 9.5–13)
PROTHROM AB SERPL-ACNC: 12.8 SEC — SIGNIFICANT CHANGE UP (ref 9.5–13)
RBC # BLD: 3.01 M/UL — LOW (ref 3.8–5.2)
RBC # BLD: 3.01 M/UL — LOW (ref 3.8–5.2)
RBC # FLD: 15.1 % — HIGH (ref 10.3–14.5)
RBC # FLD: 15.1 % — HIGH (ref 10.3–14.5)
SODIUM SERPL-SCNC: 138 MMOL/L — SIGNIFICANT CHANGE UP (ref 135–145)
SODIUM SERPL-SCNC: 138 MMOL/L — SIGNIFICANT CHANGE UP (ref 135–145)
SPECIMEN SOURCE: SIGNIFICANT CHANGE UP
SPECIMEN SOURCE: SIGNIFICANT CHANGE UP
WBC # BLD: 5.11 K/UL — SIGNIFICANT CHANGE UP (ref 3.8–10.5)
WBC # BLD: 5.11 K/UL — SIGNIFICANT CHANGE UP (ref 3.8–10.5)
WBC # FLD AUTO: 5.11 K/UL — SIGNIFICANT CHANGE UP (ref 3.8–10.5)
WBC # FLD AUTO: 5.11 K/UL — SIGNIFICANT CHANGE UP (ref 3.8–10.5)

## 2024-01-02 PROCEDURE — 99232 SBSQ HOSP IP/OBS MODERATE 35: CPT | Mod: GC

## 2024-01-02 RX ORDER — MAGNESIUM SULFATE 500 MG/ML
2 VIAL (ML) INJECTION ONCE
Refills: 0 | Status: COMPLETED | OUTPATIENT
Start: 2024-01-02 | End: 2024-01-02

## 2024-01-02 RX ORDER — POTASSIUM PHOSPHATE, MONOBASIC POTASSIUM PHOSPHATE, DIBASIC 236; 224 MG/ML; MG/ML
30 INJECTION, SOLUTION INTRAVENOUS ONCE
Refills: 0 | Status: COMPLETED | OUTPATIENT
Start: 2024-01-02 | End: 2024-01-02

## 2024-01-02 RX ADMIN — PIPERACILLIN AND TAZOBACTAM 25 GRAM(S): 4; .5 INJECTION, POWDER, LYOPHILIZED, FOR SOLUTION INTRAVENOUS at 17:53

## 2024-01-02 RX ADMIN — Medication 3 MILLILITER(S): at 17:54

## 2024-01-02 RX ADMIN — Medication 3 MILLILITER(S): at 13:23

## 2024-01-02 RX ADMIN — Medication 25 GRAM(S): at 08:46

## 2024-01-02 RX ADMIN — Medication 3 MILLILITER(S): at 01:00

## 2024-01-02 RX ADMIN — SODIUM CHLORIDE 4 MILLILITER(S): 9 INJECTION INTRAMUSCULAR; INTRAVENOUS; SUBCUTANEOUS at 05:50

## 2024-01-02 RX ADMIN — Medication 56 MICROGRAM(S): at 21:47

## 2024-01-02 RX ADMIN — PIPERACILLIN AND TAZOBACTAM 25 GRAM(S): 4; .5 INJECTION, POWDER, LYOPHILIZED, FOR SOLUTION INTRAVENOUS at 05:50

## 2024-01-02 RX ADMIN — Medication 3 MILLILITER(S): at 05:42

## 2024-01-02 RX ADMIN — Medication 1 APPLICATION(S): at 13:31

## 2024-01-02 RX ADMIN — PANTOPRAZOLE SODIUM 40 MILLIGRAM(S): 20 TABLET, DELAYED RELEASE ORAL at 05:51

## 2024-01-02 RX ADMIN — PANTOPRAZOLE SODIUM 40 MILLIGRAM(S): 20 TABLET, DELAYED RELEASE ORAL at 17:53

## 2024-01-02 RX ADMIN — SODIUM CHLORIDE 4 MILLILITER(S): 9 INJECTION INTRAMUSCULAR; INTRAVENOUS; SUBCUTANEOUS at 17:54

## 2024-01-02 RX ADMIN — POTASSIUM PHOSPHATE, MONOBASIC POTASSIUM PHOSPHATE, DIBASIC 83.33 MILLIMOLE(S): 236; 224 INJECTION, SOLUTION INTRAVENOUS at 10:47

## 2024-01-02 NOTE — PROVIDER CONTACT NOTE (CRITICAL VALUE NOTIFICATION) - BACKGROUND
Patient admitted for hyperosmolality with hypernatremia
pt admitted for hyperosmolality with hypernatremia
Pt. admitted for Hypernatremia and hyperosmolality
Patient admitted for hyperosmolality with hypernatremia

## 2024-01-02 NOTE — PROGRESS NOTE ADULT - PROBLEM SELECTOR PLAN 3
-Patient is anemic chronically, with recent Hgb drop  -Repeat CBC showing stable Hgb  -Ordered CT C/A/P. will Follow

## 2024-01-02 NOTE — PROVIDER CONTACT NOTE (CRITICAL VALUE NOTIFICATION) - ACTION/TREATMENT ORDERED:
Hold Potassium IVBP. Recheck BMP within the hour.
provider aware, continue to monitor.
Give fluid bolus and potassium IV as per order.
Provider aware. Will continue to monitor.

## 2024-01-02 NOTE — PROVIDER CONTACT NOTE (CRITICAL VALUE NOTIFICATION) - ASSESSMENT
pt is asymptomatic, AOX0 nonverbal, contracted, LR dextrose running at 50ml/hr. Lab drawn likely drawn on the arm where the infusion was going.
Patient is alert, nonverbal, no signs of distress or SOB. Vital signs stable.
Patient alert, nonverbal, no signs of distress or SOB. Vital sign stable.
pt. a&ox0. nonverbal. no facial grimace noted.

## 2024-01-02 NOTE — PROGRESS NOTE ADULT - SUBJECTIVE AND OBJECTIVE BOX
***********************************************  Kelvin Fisher MD  Internal Medicine   PGY1  ***********************************************      PROGRESS NOTE:     Patient is a 68y old  Female who presents with a chief complaint of No PO intake, lethargy (01 Jan 2024 07:50)      SUBJECTIVE / OVERNIGHT EVENTS:    OVERNIGHT:       Patient examined at bedside        MEDICATIONS  (STANDING):  albuterol/ipratropium for Nebulization 3 milliLiter(s) Nebulizer every 6 hours  ascorbic acid 500 milliGRAM(s) Oral daily  collagenase Ointment 1 Application(s) Topical daily  dextrose 5% + lactated ringers. 1000 milliLiter(s) (50 mL/Hr) IV Continuous <Continuous>  folic acid 1 milliGRAM(s) Oral daily  levothyroxine Injectable 56 MICROGram(s) IV Push at bedtime  multivitamin 1 Tablet(s) Oral daily  pantoprazole  Injectable 40 milliGRAM(s) IV Push two times a day  piperacillin/tazobactam IVPB.. 3.375 Gram(s) IV Intermittent every 12 hours  polyethylene glycol 3350 17 Gram(s) Oral daily  senna 2 Tablet(s) Oral at bedtime  sertraline 25 milliGRAM(s) Oral daily  sodium chloride 3%  Inhalation 4 milliLiter(s) Inhalation every 12 hours  thiamine 100 milliGRAM(s) Oral daily    MEDICATIONS  (PRN):      CAPILLARY BLOOD GLUCOSE      POCT Blood Glucose.: 114 mg/dL (02 Jan 2024 05:42)  POCT Blood Glucose.: 104 mg/dL (02 Jan 2024 00:16)  POCT Blood Glucose.: 128 mg/dL (01 Jan 2024 18:51)  POCT Blood Glucose.: 126 mg/dL (01 Jan 2024 12:16)    I&O's Summary    01 Jan 2024 07:01  -  02 Jan 2024 07:00  --------------------------------------------------------  IN: 0 mL / OUT: 1500 mL / NET: -1500 mL        PHYSICAL EXAM:  Vital Signs Last 24 Hrs  T(C): 36.7 (02 Jan 2024 05:35), Max: 36.7 (02 Jan 2024 00:15)  T(F): 98 (02 Jan 2024 05:35), Max: 98 (02 Jan 2024 00:15)  HR: 70 (02 Jan 2024 05:35) (70 - 81)  BP: 97/63 (02 Jan 2024 05:35) (92/63 - 110/70)  BP(mean): --  RR: 18 (02 Jan 2024 05:35) (18 - 18)  SpO2: 99% (02 Jan 2024 05:35) (96% - 99%)    Parameters below as of 02 Jan 2024 05:35  Patient On (Oxygen Delivery Method): nasal cannula  O2 Flow (L/min): 1      CONSTITUTIONAL: NAD; well-developed  HEENT: PERRL, clear conjunctiva  RESPIRATORY: Normal respiratory effort; lungs are clear to auscultation bilaterally; No Crackles/Rhonchi/Wheezing  CARDIOVASCULAR: Regular rate and rhythm, normal S1 and S2, no murmur/rub/gallop; No lower extremity edema; Peripheral pulses are 2+ bilaterally  ABDOMEN: Nontender to palpation, normoactive bowel sounds, no rebound/guarding; No hepatosplenomegaly  MUSCULOSKELETAL: no clubbing or cyanosis of digits; no joint swelling or tenderness to palpation  EXTREMITY: Lower extremities Non-tender to palpation; non-erythematous B/L  NEURO: A&Ox3; no focal deficits   PSYCH: normal mood; Affect appropirate    LABS:                        8.2    5.11  )-----------( 227      ( 02 Jan 2024 06:51 )             25.7     01-01    139  |  105  |  <4<L>  ----------------------------<  158<H>  3.2<L>   |  25  |  0.30<L>    Ca    7.5<L>      01 Jan 2024 07:33  Phos  2.3     01-01  Mg     2.0     01-01    TPro  4.9<L>  /  Alb  2.3<L>  /  TBili  0.4  /  DBili  x   /  AST  10  /  ALT  12  /  AlkPhos  47  01-01          Urinalysis Basic - ( 01 Jan 2024 07:33 )    Color: x / Appearance: x / SG: x / pH: x  Gluc: 158 mg/dL / Ketone: x  / Bili: x / Urobili: x   Blood: x / Protein: x / Nitrite: x   Leuk Esterase: x / RBC: x / WBC x   Sq Epi: x / Non Sq Epi: x / Bacteria: x          RADIOLOGY & ADDITIONAL TESTS:  Results Reviewed:   Imaging Personally Reviewed:  Electrocardiogram Personally Reviewed:    COORDINATION OF CARE:  Care Discussed with Consultants/Other Providers [Y/N]:  Prior or Outpatient Records Reviewed [Y/N]:   ***********************************************  Kelvin Fisher MD  Internal Medicine   PGY1  ***********************************************      PROGRESS NOTE:     Patient is a 68y old  Female who presents with a chief complaint of No PO intake, lethargy (01 Jan 2024 07:50)      SUBJECTIVE / OVERNIGHT EVENTS:    OVERNIGHT: No overnight events      Patient examined at bedside resting in bed. Unable to obtain ROS given mental status.        MEDICATIONS  (STANDING):  albuterol/ipratropium for Nebulization 3 milliLiter(s) Nebulizer every 6 hours  ascorbic acid 500 milliGRAM(s) Oral daily  collagenase Ointment 1 Application(s) Topical daily  dextrose 5% + lactated ringers. 1000 milliLiter(s) (50 mL/Hr) IV Continuous <Continuous>  folic acid 1 milliGRAM(s) Oral daily  levothyroxine Injectable 56 MICROGram(s) IV Push at bedtime  multivitamin 1 Tablet(s) Oral daily  pantoprazole  Injectable 40 milliGRAM(s) IV Push two times a day  piperacillin/tazobactam IVPB.. 3.375 Gram(s) IV Intermittent every 12 hours  polyethylene glycol 3350 17 Gram(s) Oral daily  senna 2 Tablet(s) Oral at bedtime  sertraline 25 milliGRAM(s) Oral daily  sodium chloride 3%  Inhalation 4 milliLiter(s) Inhalation every 12 hours  thiamine 100 milliGRAM(s) Oral daily    MEDICATIONS  (PRN):      CAPILLARY BLOOD GLUCOSE      POCT Blood Glucose.: 114 mg/dL (02 Jan 2024 05:42)  POCT Blood Glucose.: 104 mg/dL (02 Jan 2024 00:16)  POCT Blood Glucose.: 128 mg/dL (01 Jan 2024 18:51)  POCT Blood Glucose.: 126 mg/dL (01 Jan 2024 12:16)    I&O's Summary    01 Jan 2024 07:01  -  02 Jan 2024 07:00  --------------------------------------------------------  IN: 0 mL / OUT: 1500 mL / NET: -1500 mL        PHYSICAL EXAM:  Vital Signs Last 24 Hrs  T(C): 36.7 (02 Jan 2024 05:35), Max: 36.7 (02 Jan 2024 00:15)  T(F): 98 (02 Jan 2024 05:35), Max: 98 (02 Jan 2024 00:15)  HR: 70 (02 Jan 2024 05:35) (70 - 81)  BP: 97/63 (02 Jan 2024 05:35) (92/63 - 110/70)  BP(mean): --  RR: 18 (02 Jan 2024 05:35) (18 - 18)  SpO2: 99% (02 Jan 2024 05:35) (96% - 99%)    Parameters below as of 02 Jan 2024 05:35  Patient On (Oxygen Delivery Method): nasal cannula  O2 Flow (L/min): 1      CONSTITUTIONAL: NAD; well-developed  HEENT: PERRL, clear conjunctiva  RESPIRATORY: Normal respiratory effort; lungs are clear to auscultation bilaterally; No Crackles/Rhonchi/Wheezing  CARDIOVASCULAR: Regular rate and rhythm, normal S1 and S2, no murmur/rub/gallop; No lower extremity edema; Peripheral pulses are 2+ bilaterally  ABDOMEN: Nontender to palpation, normoactive bowel sounds, no rebound/guarding; No hepatosplenomegaly  MUSCULOSKELETAL: no clubbing or cyanosis of digits; no joint swelling or tenderness to palpation  EXTREMITY: Lower extremities Non-tender to palpation; non-erythematous B/L  NEURO: A&Ox3; no focal deficits   PSYCH: normal mood; Affect appropirate    LABS:                        8.2    5.11  )-----------( 227      ( 02 Jan 2024 06:51 )             25.7     01-01    139  |  105  |  <4<L>  ----------------------------<  158<H>  3.2<L>   |  25  |  0.30<L>    Ca    7.5<L>      01 Jan 2024 07:33  Phos  2.3     01-01  Mg     2.0     01-01    TPro  4.9<L>  /  Alb  2.3<L>  /  TBili  0.4  /  DBili  x   /  AST  10  /  ALT  12  /  AlkPhos  47  01-01          Urinalysis Basic - ( 01 Jan 2024 07:33 )    Color: x / Appearance: x / SG: x / pH: x  Gluc: 158 mg/dL / Ketone: x  / Bili: x / Urobili: x   Blood: x / Protein: x / Nitrite: x   Leuk Esterase: x / RBC: x / WBC x   Sq Epi: x / Non Sq Epi: x / Bacteria: x          RADIOLOGY & ADDITIONAL TESTS:  Results Reviewed:   Imaging Personally Reviewed:  Electrocardiogram Personally Reviewed:    COORDINATION OF CARE:  Care Discussed with Consultants/Other Providers [Y/N]:  Prior or Outpatient Records Reviewed [Y/N]:

## 2024-01-02 NOTE — CONSULT NOTE ADULT - SUBJECTIVE AND OBJECTIVE BOX
HPI:  JONATHAN CHILD is a 68 year old female with history of breast cancer s/p L mastectomy, TBI (s/p a fall ~2 years ago) c/b aphasia, meningioma, hypothyroidism (hx Grave's dz s/p radioactive iodine), CVA, and dysphagia who presents to [facility] ED with increased lethargy.    Unable to obtain full HPI due to underlying mental status.  Initially presents with lethargy and failure to thrive with initial sodium 166 [decreased to 141 within 48 hours].  Per son upon admission, patient developed inability to speak or tolerate food.  Hospital course c/b acute hypoxic respiratory failure and possible aspiration pneumonitis/pneumonia.  GI consulted for evaluation of PEG.    ROS:   Unable to obtain full ROS due to underlying mental status.    PMHX/PSHX:    Hypothyroidism    Hyperthyroidism    Breast cancer    SVT (supraventricular tachycardia)    SAH (subarachnoid hemorrhage)    Multiple falls    Salivary Gland Disease    C Section    Abnormality, eye    S/P D&C (status post dilation and curettage)    H/O left mastectomy      Allergies:  Tapazole (Hives)      Home Medications: reviewed  Hospital Medications:  albuterol/ipratropium for Nebulization 3 milliLiter(s) Nebulizer every 6 hours  ascorbic acid 500 milliGRAM(s) Oral daily  collagenase Ointment 1 Application(s) Topical daily  dextrose 5% + lactated ringers. 1000 milliLiter(s) IV Continuous <Continuous>  folic acid 1 milliGRAM(s) Oral daily  levothyroxine Injectable 56 MICROGram(s) IV Push at bedtime  multivitamin 1 Tablet(s) Oral daily  pantoprazole  Injectable 40 milliGRAM(s) IV Push two times a day  piperacillin/tazobactam IVPB.. 3.375 Gram(s) IV Intermittent every 12 hours  polyethylene glycol 3350 17 Gram(s) Oral daily  senna 2 Tablet(s) Oral at bedtime  sertraline 25 milliGRAM(s) Oral daily  sodium chloride 3%  Inhalation 4 milliLiter(s) Inhalation every 12 hours  thiamine 100 milliGRAM(s) Oral daily      Social History:   Unable to obtain due to underlying mental status.    Family history:    No pertinent family history in first degree relatives        PHYSICAL EXAM:   Vital Signs:  Vital Signs Last 24 Hrs  T(C): 36.5 (02 Jan 2024 11:11), Max: 36.7 (02 Jan 2024 00:15)  T(F): 97.7 (02 Jan 2024 11:11), Max: 98 (02 Jan 2024 00:15)  HR: 68 (02 Jan 2024 11:11) (68 - 81)  BP: 105/70 (02 Jan 2024 11:11) (92/63 - 110/70)  BP(mean): --  RR: 18 (02 Jan 2024 11:11) (18 - 18)  SpO2: 98% (02 Jan 2024 11:11) (96% - 99%)    Parameters below as of 02 Jan 2024 11:11  Patient On (Oxygen Delivery Method): nasal cannula  O2 Flow (L/min): 1    Daily     Daily     GENERAL: no acute distress  NEURO: not fully alert/oriented  HEENT: NCAT, no conjunctival pallor appreciated  CHEST: no respiratory distress, no accessory muscle use  CARDIAC: regular rate, +S1/S2  ABDOMEN: soft, nontender, no rebound or guarding  EXTREMITIES: warm, well perfused  SKIN: no lesions noted    LABS: reviewed                        8.2    5.11  )-----------( 227      ( 02 Jan 2024 06:51 )             25.7     01-02    138  |  105  |  <4<L>  ----------------------------<  1098<HH>  3.7   |  20<L>  |  <0.30<L>    Ca    7.3<L>      02 Jan 2024 06:51  Phos  1.6     01-02  Mg     1.5     01-02    TPro  3.9<L>  /  Alb  1.8<L>  /  TBili  0.3  /  DBili  x   /  AST  7<L>  /  ALT  10  /  AlkPhos  40  01-02    LIVER FUNCTIONS - ( 02 Jan 2024 06:51 )  Alb: 1.8 g/dL / Pro: 3.9 g/dL / ALK PHOS: 40 U/L / ALT: 10 U/L / AST: 7 U/L / GGT: x               Diagnostic Studies: see sunrise for full report         HPI:  JONATHAN CHILD is a 68 year old female with history of breast cancer s/p L mastectomy, TBI (s/p a fall ~2 years ago) c/b aphasia, meningioma, hypothyroidism (hx Grave's dz s/p radioactive iodine), CVA, and dysphagia who presents to [facility] ED with increased lethargy.    Unable to obtain full HPI due to underlying mental status.  Initially presents with lethargy and failure to thrive with initial sodium 166 [decreased to 141 within 48 hours].  Per son upon admission, patient developed inability to speak or tolerate food.  Hospital course c/b acute hypoxic respiratory failure and possible aspiration pneumonitis/pneumonia.  GI consulted for evaluation of PEG.    ROS:   Unable to obtain full ROS due to underlying mental status.    PMHX/PSHX:    Hypothyroidism    Hyperthyroidism    Breast cancer    SVT (supraventricular tachycardia)    SAH (subarachnoid hemorrhage)    Multiple falls    Salivary Gland Disease    C Section    Abnormality, eye    S/P D&C (status post dilation and curettage)    H/O left mastectomy      Allergies:  Tapazole (Hives)      Home Medications: reviewed  Hospital Medications:  albuterol/ipratropium for Nebulization 3 milliLiter(s) Nebulizer every 6 hours  ascorbic acid 500 milliGRAM(s) Oral daily  collagenase Ointment 1 Application(s) Topical daily  dextrose 5% + lactated ringers. 1000 milliLiter(s) IV Continuous <Continuous>  folic acid 1 milliGRAM(s) Oral daily  levothyroxine Injectable 56 MICROGram(s) IV Push at bedtime  multivitamin 1 Tablet(s) Oral daily  pantoprazole  Injectable 40 milliGRAM(s) IV Push two times a day  piperacillin/tazobactam IVPB.. 3.375 Gram(s) IV Intermittent every 12 hours  polyethylene glycol 3350 17 Gram(s) Oral daily  senna 2 Tablet(s) Oral at bedtime  sertraline 25 milliGRAM(s) Oral daily  sodium chloride 3%  Inhalation 4 milliLiter(s) Inhalation every 12 hours  thiamine 100 milliGRAM(s) Oral daily      Social History:   Unable to obtain due to underlying mental status.    Family history:    No pertinent family history in first degree relatives        PHYSICAL EXAM:   Vital Signs:  Vital Signs Last 24 Hrs  T(C): 36.5 (02 Jan 2024 11:11), Max: 36.7 (02 Jan 2024 00:15)  T(F): 97.7 (02 Jan 2024 11:11), Max: 98 (02 Jan 2024 00:15)  HR: 68 (02 Jan 2024 11:11) (68 - 81)  BP: 105/70 (02 Jan 2024 11:11) (92/63 - 110/70)  BP(mean): --  RR: 18 (02 Jan 2024 11:11) (18 - 18)  SpO2: 98% (02 Jan 2024 11:11) (96% - 99%)    Parameters below as of 02 Jan 2024 11:11  Patient On (Oxygen Delivery Method): nasal cannula  O2 Flow (L/min): 1    Daily     Daily     GENERAL: no acute distress  NEURO: not fully alert/oriented  HEENT: NCAT, no conjunctival pallor appreciated  CHEST: no respiratory distress, no accessory muscle use  CARDIAC: regular rate, +S1/S2  ABDOMEN: soft, nontender, no rebound or guarding  EXTREMITIES: warm, well perfused  SKIN: no lesions noted    LABS: reviewed                        8.2    5.11  )-----------( 227      ( 02 Jan 2024 06:51 )             25.7     01-02    138  |  105  |  <4<L>  ----------------------------<  1098<HH>  3.7   |  20<L>  |  <0.30<L>    Ca    7.3<L>      02 Jan 2024 06:51  Phos  1.6     01-02  Mg     1.5     01-02    TPro  3.9<L>  /  Alb  1.8<L>  /  TBili  0.3  /  DBili  x   /  AST  7<L>  /  ALT  10  /  AlkPhos  40  01-02    LIVER FUNCTIONS - ( 02 Jan 2024 06:51 )  Alb: 1.8 g/dL / Pro: 3.9 g/dL / ALK PHOS: 40 U/L / ALT: 10 U/L / AST: 7 U/L / GGT: x               Diagnostic Studies: see sunrise for full report

## 2024-01-02 NOTE — PROGRESS NOTE ADULT - ASSESSMENT
68F with PMH significant for breast cancer s/p L mastectomy, TBI (s/p a fall ~2 years ago) c/b aphasia, meningioma, hypothyroidism (hx Grave's dz s/p radioactive iodine), CVA, and dysphagia who presents to [facility] ED on 12/20/2023 with no PO intake and increased lethargy in the last 2-3 days, admitted for hypernatremia 166 and UTI. Hospital course complicated by AHRF likely 2/2 to aspiration pneumonitis.    68F with PMH significant for breast cancer s/p L mastectomy, TBI (s/p a fall ~2 years ago) c/b aphasia, meningioma, hypothyroidism (hx Grave's dz s/p radioactive iodine), CVA, and dysphagia who presents to [facility] ED on 12/20/2023 with no PO intake and increased lethargy in the last 2-3 days, admitted for hypernatremia 166 and UTI. Hospital course complicated by AHRF likely 2/2 to aspiration pneumonitis.

## 2024-01-02 NOTE — MEDICAL STUDENT PROGRESS NOTE(EDUCATION) - SUBJECTIVE AND OBJECTIVE BOX
Medical Student Note    Patient Information:  JONATHAN YOUNGER / 68y / Female / MRN#:[id number]    Hospital Day: 13d    Interval History:  Patient seen and examined at bedside. No acute events overnight. Pt arousable to verbal stimuli.     Past Medical History:  Hypothyroidism    Hyperthyroidism    Breast cancer    SVT (supraventricular tachycardia)    SAH (subarachnoid hemorrhage)    Multiple falls      Past Surgical History:  Salivary Gland Disease    C Section    Abnormality, eye    S/P D&C (status post dilation and curettage)    H/O left mastectomy      Allergies:  Tapazole (Hives)    Medications:  PRN:    Standing:  albuterol/ipratropium for Nebulization 3 milliLiter(s) Nebulizer every 6 hours  ascorbic acid 500 milliGRAM(s) Oral daily  collagenase Ointment 1 Application(s) Topical daily  dextrose 5% + lactated ringers. 1000 milliLiter(s) (50 mL/Hr) IV Continuous <Continuous>  folic acid 1 milliGRAM(s) Oral daily  levothyroxine Injectable 56 MICROGram(s) IV Push at bedtime  multivitamin 1 Tablet(s) Oral daily  pantoprazole  Injectable 40 milliGRAM(s) IV Push two times a day  piperacillin/tazobactam IVPB.. 3.375 Gram(s) IV Intermittent every 12 hours  polyethylene glycol 3350 17 Gram(s) Oral daily  senna 2 Tablet(s) Oral at bedtime  sertraline 25 milliGRAM(s) Oral daily  sodium chloride 3%  Inhalation 4 milliLiter(s) Inhalation every 12 hours  thiamine 100 milliGRAM(s) Oral daily    Home:  folic acid 1 mg oral tablet: 1 tab(s) orally once a day  levothyroxine 112 mcg (0.112 mg) oral tablet: 1 tab(s) orally once a day  Multiple Vitamins oral tablet: 1 tab(s) orally once a day  Santyl 250 units/g topical ointment: Apply topically to affected area once a day  sertraline 25 mg oral tablet: 1 tab(s) orally once a day  tamoxifen 20 mg oral tablet: 1 tab(s) orally once a day    Vitals:  T(C): 36.5, Max: 36.7 (01-02-24 @ 00:15)  T(F): 97.7, Max: 98 (01-02-24 @ 00:15)  HR: 68 (68 - 81)  BP: 105/70 (92/63 - 110/70)  RR: 18 (18 - 18)  SpO2: 98% (96% - 99%)    Physical Exam:  General: Awake, Alert. Not in acute distress.  Head: Normocephalic atraumatic.  Neck: Supple  Heart: Regular rate and rhythm; S1, S2; No murmurs.  Lungs: Clear to auscultation bilaterally.  Abdomen: Soft, nontender, nondistended.  Extremities: No edema in upper or lower extremities.  Neuro: AAOx3, No focal deficits.    Labs:  CBC (01-02 @ 06:51)                        Hgb: 8.2    WBC: 5.11  )-----------------( Plts: 227                              Hct: 25.7     Chem (01-02 @ 06:51)  Na: 138  |     Cl: 105     |  BUN: <4  -----------------------------------------< Gluc: 1098    K: 3.7   |    HCO3: 20  |  Cr: <0.30    Ca 7.3 (01-02 @ 06:51)  Phos 1.6 (01-02 @ 06:51)  Mg 1.5 (01-02 @ 06:51)    LFTs (01-02 @ 06:51)  TPro 3.9  /  Alb 1.8  TBili 0.3  /  DBili     AST 7   /  ALT 10  /  AlkPhos 40        PT/INR (01-02 @ 06:51)  PT: 12.8 ; INR: 1.17   PTT: 25.2      Microbiology:  Culture - Blood (collected 12-29 @ 02:45)  Source: .Blood Blood-Peripheral  Preliminary Report (01-02 @ 10:01):    No growth at 4 days    Culture - Blood (collected 12-29 @ 02:45)  Source: .Blood Blood-Peripheral  Preliminary Report (01-02 @ 10:01):    No growth at 4 days    Culture - Urine (collected 12-28 @ 12:23)  Source: Catheterized Catheterized  Final Report (12-29 @ 13:30):    No growth    Culture - Blood (collected 12-28 @ 01:32)  Source: .Blood Blood-Peripheral  Final Report (01-02 @ 09:01):    No growth at 5 days        Radiology:  CT C/A/P: bibasilar atelectasis, trace perigastric stranding (consistent with possible gastritis) Medical Student Note    Patient Information:  JONATHAN YOUNGER / 68y / Female / MRN#:[id number]    Hospital Day: 13d    Interval History:  Patient seen and examined at bedside. No acute events overnight. Pt arousable to verbal stimuli.     Past Medical History:  Hypothyroidism    Hyperthyroidism    Breast cancer    SVT (supraventricular tachycardia)    SAH (subarachnoid hemorrhage)    Multiple falls      Past Surgical History:  Salivary Gland Disease    C Section    Abnormality, eye    S/P D&C (status post dilation and curettage)    H/O left mastectomy      Allergies:  Tapazole (Hives)    Medications:  PRN:    Standing:  albuterol/ipratropium for Nebulization 3 milliLiter(s) Nebulizer every 6 hours  ascorbic acid 500 milliGRAM(s) Oral daily  collagenase Ointment 1 Application(s) Topical daily  dextrose 5% + lactated ringers. 1000 milliLiter(s) (50 mL/Hr) IV Continuous <Continuous>  folic acid 1 milliGRAM(s) Oral daily  levothyroxine Injectable 56 MICROGram(s) IV Push at bedtime  multivitamin 1 Tablet(s) Oral daily  pantoprazole  Injectable 40 milliGRAM(s) IV Push two times a day  piperacillin/tazobactam IVPB.. 3.375 Gram(s) IV Intermittent every 12 hours  polyethylene glycol 3350 17 Gram(s) Oral daily  senna 2 Tablet(s) Oral at bedtime  sertraline 25 milliGRAM(s) Oral daily  sodium chloride 3%  Inhalation 4 milliLiter(s) Inhalation every 12 hours  thiamine 100 milliGRAM(s) Oral daily    Home:  folic acid 1 mg oral tablet: 1 tab(s) orally once a day  levothyroxine 112 mcg (0.112 mg) oral tablet: 1 tab(s) orally once a day  Multiple Vitamins oral tablet: 1 tab(s) orally once a day  Santyl 250 units/g topical ointment: Apply topically to affected area once a day  sertraline 25 mg oral tablet: 1 tab(s) orally once a day  tamoxifen 20 mg oral tablet: 1 tab(s) orally once a day    Vitals:  T(C): 36.5, Max: 36.7 (01-02-24 @ 00:15)  T(F): 97.7, Max: 98 (01-02-24 @ 00:15)  HR: 68 (68 - 81)  BP: 105/70 (92/63 - 110/70)  RR: 18 (18 - 18)  SpO2: 98% (96% - 99%)    Physical Exam:  General: Awake, Alert. Not in acute distress.  Head: Normocephalic atraumatic.  Neck: Supple  Heart: Regular rate and rhythm; S1, S2; No murmurs.  Lungs: Clear to auscultation bilaterally.  Abdomen: Soft, nontender, nondistended.  Extremities: No edema in upper or lower extremities.  Neuro: AAOx3, No focal deficits.    Labs:  CBC (01-02 @ 06:51)                        Hgb: 8.2    WBC: 5.11  )-----------------( Plts: 227                              Hct: 25.7     Chem (01-02 @ 06:51)  Na: 138  |     Cl: 105     |  BUN: <4  -----------------------------------------< Gluc: 1098    K: 3.7   |    HCO3: 20  |  Cr: <0.30    Ca 7.3 (01-02 @ 06:51)  Phos 1.6 (01-02 @ 06:51)  Mg 1.5 (01-02 @ 06:51)    LFTs (01-02 @ 06:51)  TPro 3.9  /  Alb 1.8  TBili 0.3  /  DBili     AST 7   /  ALT 10  /  AlkPhos 40        PT/INR (01-02 @ 06:51)  PT: 12.8 ; INR: 1.17   PTT: 25.2      Microbiology:  Culture - Blood (collected 12-29 @ 02:45)  Source: .Blood Blood-Peripheral  Preliminary Report (01-02 @ 10:01):    No growth at 4 days    Culture - Blood (collected 12-29 @ 02:45)  Source: .Blood Blood-Peripheral  Preliminary Report (01-02 @ 10:01):    No growth at 4 days    Culture - Urine (collected 12-28 @ 12:23)  Source: Catheterized Catheterized  Final Report (12-29 @ 13:30):    No growth    Culture - Blood (collected 12-28 @ 01:32)  Source: .Blood Blood-Peripheral  Final Report (01-02 @ 09:01):    No growth at 5 days        Radiology:  CT C/A/P: bibasilar atelectasis, trace perigastric stranding (consistent with possible gastritis)

## 2024-01-02 NOTE — PROGRESS NOTE ADULT - ATTENDING COMMENTS
Acute hypoxic respiratory failure secondary to likely aspiration- resolved- complete 5 days Zosyn  Dysphagia of unclear etiology with severe malnutrition-  has requested PEG evaluation- GI eval today  Hypernatremia- cont IVF  Functional quadriplegia with multiple pressure ulcers- wound care following   Hypothyroidism- Continue levothyroxine IV 56 mcg QD  Palliative care consulted- spouse requested further evaluation and treatement for reversible causes of current conditions  Prognosis guarded- remains full code

## 2024-01-02 NOTE — CONSULT NOTE ADULT - ASSESSMENT
68 year old female with history of breast cancer s/p L mastectomy, TBI (s/p a fall ~2 years ago) c/b aphasia, meningioma, hypothyroidism (hx Grave's dz s/p radioactive iodine), CVA, and dysphagia who presents to [facility] ED with increased lethargy and failure to thrive with initial sodium 166 [decreased to 141 within 48 hours].  Per son upon admission, patient developed inability to speak or tolerate food.  Hospital course c/b acute hypoxic respiratory failure and possible aspiration pneumonitis/pneumonia.      # Evaluation of PEG  # Hypernatremia, improved  # Acute hypoxic respiratory failure, improving  # Aspiration pneumonia    Recommendations:  -will delineate GOC with family  -will re-evaluate for PEG placement if medically optimized and patient/family amenable    Note incomplete until finalized by attending signature/attestation.    Mika Franklin  GI/Hepatology Fellow    MONDAY-FRIDAY 8AM-5PM:  Pager# [phone] ([facility]) or [phone] ([facility])    NON-URGENT CONSULTS:  Please email [email or web address] OR [email or web address]  AT NIGHT AND ON WEEKENDS:  Contact on-call GI fellow via answering service ([phone]) from 5pm-8am and on weekends/holidays   68 year old female with history of breast cancer s/p L mastectomy, TBI (s/p a fall ~2 years ago) c/b aphasia, meningioma, hypothyroidism (hx Grave's dz s/p radioactive iodine), CVA, and dysphagia who presents to [facility] ED with increased lethargy and failure to thrive with initial sodium 166 [decreased to 141 within 48 hours].  Per son upon admission, patient developed inability to speak or tolerate food.  Hospital course c/b acute hypoxic respiratory failure and possible aspiration pneumonitis/pneumonia.      # Evaluation of PEG  # Hypernatremia, improved  # Acute hypoxic respiratory failure, improving  # Aspiration pneumonia    Recommendations:  -will delineate GOC with family  -will re-evaluate for PEG placement if medically optimized and patient/family amenable    Note incomplete until finalized by attending signature/attestation.    Mika Franklin  GI/Hepatology Fellow    MONDAY-FRIDAY 8AM-5PM:  Pager# [phone] ([facility]) or [phone] ([facility])    NON-URGENT CONSULTS:  Please email [email or web address] OR [email or web address]  AT NIGHT AND ON WEEKENDS:  Contact on-call GI fellow via answering service ([phone]) from 5pm-8am and on weekends/holidays

## 2024-01-02 NOTE — MEDICAL STUDENT PROGRESS NOTE(EDUCATION) - ASSESSMENT
Assessment:   68F with PMH significant for breast cancer s/p L mastectomy, TBI (s/p a fall ~2 years ago) c/b aphasia, meningioma, hypothyroidism (hx Grave's dz s/p radioactive iodine), CVA, and dysphagia who presents to [facility] ED on 12/20/2023 with no PO intake and increased lethargy in the last 2-3 days, admitted for hypernatremia 166 and UTI. Hospital course complicated by AHRF likely 2/2 to aspiration pneumonitis.     Plan:   1. Acute hypoxemic respiratory failure.    - Rapid on 12/28 for hypoxia, was on HFNC. Suspect likely aspiration pneumonitis. CXR negative for focal consolidations.   - blood cultures NGT   - currently on 1L NC, wean O2 as tolerated   - C/w Zosyn x 7d (started 12/29)     2: Urinary tract infection.    -  Hx of recurrent UTI (last admission 9/2023). Recent urine culture showing 100k Ecoli. S/p IV ceftriaxone (12/21-23). Febrile overnight 12/27, repeat U/A positive. Restarted on ceftriaxone (12/28-29) but escalated in setting of ongoing aspiration pneumonia.    - c/w Zosyn x 7d (started 12/29)     3. Drop in hemoglobin.    - Patient is anemic chronically, with recent Hgb drop   - Repeat CBC showing stable Hgb 8.2  - CT C/A/P showing no evidence of free contrast extravasation     4: Severe malnutrition.    - Hx of dysphagia since 2-3 months ago per son. Notable for severe b/l ext contracture and cachexia. Low PO intake at baseline, no PO intake in the last 2-3 days. NGT placed 12/22, trickle feeds.    - Speech/swallow: Puree/moderately thick liquids via 1/2 tsp amount only; however pt unlikely to get adequate calories. Repeat assessment recommend NPO.    - NG tube removed during RRT, will hold off on replacing NG tube given concern patient likely aspirated.    - NPO for now , high risk for aspiration   - will consult GI for PEG tube placement today (1/2)  - possible NGT placement today pending GI consult regarding PEG tube     5: Hypernatremia.    - Symptoms of lethargy/encephalopathy >48h with no PO intake, likely has chronic hypernatremia (vs acute) 2/2 severe dehydration. Na 166 on admission, improved w/ D5.    - Resolved – Na+ 138 today   - monitor BMP daily.       6: Acute encephalopathy.    - P/w progressively worsening lethargy/encephalopathy i/s/o no PO intake for 2-3 days; at baseline, answers simple questions (Y/N, name) per son. Most likely ddx include metabolic (hypernatremia) vs infection (c/f UTI) vs mixed   - Currently AOx0, non-verbal, opens eyes to voice, unable to follow commands   - chronic hypernatremia resolved but likely ongoing infectious etiology.     7: Pressure ulcer.    - Multiple pressure ulcers noted on physical exam with varying stages (I-III), some with peeling and discoloration, possible tissue loss   - Son reports pt using Santyl topical at home   - wound care consulted, appreciate recs.     8: History of breast cancer.    - Hx of breast cancer, s/p L mastectomy   - Holding home Tamoxifen (NPO)      9: Hypothyroidism.    - Hx of Grave's disease s/p radioactive iodine   - resumed home levothyroxine 56 mg in IV formulation       10: Preventive measure.    - DVT ppx: Lovenox stopped due to concern for bleed, SCDs in place   - Diet: NPO for now, pending NG tube    - Dispo: pending further GOC discussions.  Assessment:   68F with PMH significant for breast cancer s/p L mastectomy, TBI (s/p a fall ~2 years ago) c/b aphasia, meningioma, hypothyroidism (hx Grave's dz s/p radioactive iodine), CVA, and dysphagia who presents to [facility] ED on 12/20/2023 with no PO intake and increased lethargy in the last 2-3 days, admitted for hypernatremia 166 and UTI. Hospital course complicated by AHRF likely 2/2 to aspiration pneumonitis.     Plan:   1. Acute hypoxemic respiratory failure.    - Rapid on 12/28 for hypoxia, was on HFNC. Suspect likely aspiration pneumonitis. CXR negative for focal consolidations.   - blood cultures NGT   - currently on 1L NC, wean O2 as tolerated   - C/w Zosyn x 7d (started 12/29)     2: Urinary tract infection.    -  Hx of recurrent UTI (last admission 9/2023). Recent urine culture showing 100k Ecoli. S/p IV ceftriaxone (12/21-23). Febrile overnight 12/27, repeat U/A positive. Restarted on ceftriaxone (12/28-29) but escalated in setting of ongoing aspiration pneumonia.    - c/w Zosyn x 7d (started 12/29)     3. Drop in hemoglobin.    - Patient is anemic chronically, with recent Hgb drop   - Repeat CBC showing stable Hgb 8.2  - CT C/A/P showing no evidence of free contrast extravasation     4: Severe malnutrition.    - Hx of dysphagia since 2-3 months ago per son. Notable for severe b/l ext contracture and cachexia. Low PO intake at baseline, no PO intake in the last 2-3 days. NGT placed 12/22, trickle feeds.    - Speech/swallow: Puree/moderately thick liquids via 1/2 tsp amount only; however pt unlikely to get adequate calories. Repeat assessment recommend NPO.    - NG tube removed during RRT, will hold off on replacing NG tube given concern patient likely aspirated.    - NPO for now , high risk for aspiration   - will consult GI for PEG tube placement today (1/2)  - possible NGT placement today pending GI consult regarding PEG tube     5: Hypernatremia.    - Symptoms of lethargy/encephalopathy >48h with no PO intake, likely has chronic hypernatremia (vs acute) 2/2 severe dehydration. Na 166 on admission, improved w/ D5.    - Resolved – Na+ 138 today   - monitor BMP daily.       6: Acute encephalopathy.    - P/w progressively worsening lethargy/encephalopathy i/s/o no PO intake for 2-3 days; at baseline, answers simple questions (Y/N, name) per son. Most likely ddx include metabolic (hypernatremia) vs infection (c/f UTI) vs mixed   - Currently AOx0, non-verbal, opens eyes to voice, unable to follow commands   - chronic hypernatremia resolved but likely ongoing infectious etiology.     7: Pressure ulcer.    - Multiple pressure ulcers noted on physical exam with varying stages (I-III), some with peeling and discoloration, possible tissue loss   - Son reports pt using Santyl topical at home   - wound care consulted, appreciate recs.     8: History of breast cancer.    - Hx of breast cancer, s/p L mastectomy   - Holding home Tamoxifen (NPO)      9: Hypothyroidism.    - Hx of Grave's disease s/p radioactive iodine   - resumed home levothyroxine 56 mg in IV formulation       10: Preventive measure.    - DVT ppx: Lovenox stopped due to concern for bleed, SCDs in place   - Diet: NPO for now, pending NG tube    - Dispo: pending further GOC discussions.

## 2024-01-02 NOTE — CONSULT NOTE ADULT - ATTENDING COMMENTS
GI consulted for peg tube placement.   Will discuss with family.   Will need medical optimization prior to procedure.   GI to follow, please call w questions

## 2024-01-02 NOTE — PROGRESS NOTE ADULT - PROBLEM SELECTOR PLAN 4
Hx of dysphagia since 2-3 months ago per son. Notable for severe b/l ext contracture and cachexia. Low PO intake at baseline, no PO intake in the last 2-3 days. NGT placed 12/22, trickle feeds.   S&S: Puree/moderately thick liquids via 1/2 tsp amount only; however pt unlikely to get adequate calories. Repeat assessment recommend NPO.   NG tube removed during RRT overnight, will hold off on replacing NG tube given concern patient likely aspirated.     - NPO for now , high risk for aspiration  - Will re-evaluate daily for NGT placement, not placed today due to concern for bleeding  - will consult GI for PEG tube placement next week

## 2024-01-03 LAB
ALBUMIN SERPL ELPH-MCNC: 2.3 G/DL — LOW (ref 3.3–5)
ALBUMIN SERPL ELPH-MCNC: 2.3 G/DL — LOW (ref 3.3–5)
ALP SERPL-CCNC: 55 U/L — SIGNIFICANT CHANGE UP (ref 40–120)
ALP SERPL-CCNC: 55 U/L — SIGNIFICANT CHANGE UP (ref 40–120)
ALT FLD-CCNC: 17 U/L — SIGNIFICANT CHANGE UP (ref 10–45)
ALT FLD-CCNC: 17 U/L — SIGNIFICANT CHANGE UP (ref 10–45)
ANION GAP SERPL CALC-SCNC: 9 MMOL/L — SIGNIFICANT CHANGE UP (ref 5–17)
ANION GAP SERPL CALC-SCNC: 9 MMOL/L — SIGNIFICANT CHANGE UP (ref 5–17)
AST SERPL-CCNC: 19 U/L — SIGNIFICANT CHANGE UP (ref 10–40)
AST SERPL-CCNC: 19 U/L — SIGNIFICANT CHANGE UP (ref 10–40)
BASOPHILS # BLD AUTO: 0.01 K/UL — SIGNIFICANT CHANGE UP (ref 0–0.2)
BASOPHILS # BLD AUTO: 0.01 K/UL — SIGNIFICANT CHANGE UP (ref 0–0.2)
BASOPHILS NFR BLD AUTO: 0.2 % — SIGNIFICANT CHANGE UP (ref 0–2)
BASOPHILS NFR BLD AUTO: 0.2 % — SIGNIFICANT CHANGE UP (ref 0–2)
BILIRUB SERPL-MCNC: 0.5 MG/DL — SIGNIFICANT CHANGE UP (ref 0.2–1.2)
BILIRUB SERPL-MCNC: 0.5 MG/DL — SIGNIFICANT CHANGE UP (ref 0.2–1.2)
BUN SERPL-MCNC: <4 MG/DL — LOW (ref 7–23)
BUN SERPL-MCNC: <4 MG/DL — LOW (ref 7–23)
CALCIUM SERPL-MCNC: 7.8 MG/DL — LOW (ref 8.4–10.5)
CALCIUM SERPL-MCNC: 7.8 MG/DL — LOW (ref 8.4–10.5)
CHLORIDE SERPL-SCNC: 107 MMOL/L — SIGNIFICANT CHANGE UP (ref 96–108)
CHLORIDE SERPL-SCNC: 107 MMOL/L — SIGNIFICANT CHANGE UP (ref 96–108)
CO2 SERPL-SCNC: 24 MMOL/L — SIGNIFICANT CHANGE UP (ref 22–31)
CO2 SERPL-SCNC: 24 MMOL/L — SIGNIFICANT CHANGE UP (ref 22–31)
CREAT SERPL-MCNC: 0.34 MG/DL — LOW (ref 0.5–1.3)
CREAT SERPL-MCNC: 0.34 MG/DL — LOW (ref 0.5–1.3)
CULTURE RESULTS: SIGNIFICANT CHANGE UP
EGFR: 111 ML/MIN/1.73M2 — SIGNIFICANT CHANGE UP
EGFR: 111 ML/MIN/1.73M2 — SIGNIFICANT CHANGE UP
EOSINOPHIL # BLD AUTO: 0.11 K/UL — SIGNIFICANT CHANGE UP (ref 0–0.5)
EOSINOPHIL # BLD AUTO: 0.11 K/UL — SIGNIFICANT CHANGE UP (ref 0–0.5)
EOSINOPHIL NFR BLD AUTO: 2.1 % — SIGNIFICANT CHANGE UP (ref 0–6)
EOSINOPHIL NFR BLD AUTO: 2.1 % — SIGNIFICANT CHANGE UP (ref 0–6)
GLUCOSE BLDC GLUCOMTR-MCNC: 112 MG/DL — HIGH (ref 70–99)
GLUCOSE BLDC GLUCOMTR-MCNC: 112 MG/DL — HIGH (ref 70–99)
GLUCOSE BLDC GLUCOMTR-MCNC: 114 MG/DL — HIGH (ref 70–99)
GLUCOSE BLDC GLUCOMTR-MCNC: 114 MG/DL — HIGH (ref 70–99)
GLUCOSE BLDC GLUCOMTR-MCNC: 92 MG/DL — SIGNIFICANT CHANGE UP (ref 70–99)
GLUCOSE BLDC GLUCOMTR-MCNC: 92 MG/DL — SIGNIFICANT CHANGE UP (ref 70–99)
GLUCOSE SERPL-MCNC: 189 MG/DL — HIGH (ref 70–99)
GLUCOSE SERPL-MCNC: 189 MG/DL — HIGH (ref 70–99)
HCT VFR BLD CALC: 29.6 % — LOW (ref 34.5–45)
HCT VFR BLD CALC: 29.6 % — LOW (ref 34.5–45)
HGB BLD-MCNC: 9.1 G/DL — LOW (ref 11.5–15.5)
HGB BLD-MCNC: 9.1 G/DL — LOW (ref 11.5–15.5)
IMM GRANULOCYTES NFR BLD AUTO: 0.4 % — SIGNIFICANT CHANGE UP (ref 0–0.9)
IMM GRANULOCYTES NFR BLD AUTO: 0.4 % — SIGNIFICANT CHANGE UP (ref 0–0.9)
LYMPHOCYTES # BLD AUTO: 1.02 K/UL — SIGNIFICANT CHANGE UP (ref 1–3.3)
LYMPHOCYTES # BLD AUTO: 1.02 K/UL — SIGNIFICANT CHANGE UP (ref 1–3.3)
LYMPHOCYTES # BLD AUTO: 19.7 % — SIGNIFICANT CHANGE UP (ref 13–44)
LYMPHOCYTES # BLD AUTO: 19.7 % — SIGNIFICANT CHANGE UP (ref 13–44)
MAGNESIUM SERPL-MCNC: 2.4 MG/DL — SIGNIFICANT CHANGE UP (ref 1.6–2.6)
MAGNESIUM SERPL-MCNC: 2.4 MG/DL — SIGNIFICANT CHANGE UP (ref 1.6–2.6)
MCHC RBC-ENTMCNC: 26.8 PG — LOW (ref 27–34)
MCHC RBC-ENTMCNC: 26.8 PG — LOW (ref 27–34)
MCHC RBC-ENTMCNC: 30.7 GM/DL — LOW (ref 32–36)
MCHC RBC-ENTMCNC: 30.7 GM/DL — LOW (ref 32–36)
MCV RBC AUTO: 87.1 FL — SIGNIFICANT CHANGE UP (ref 80–100)
MCV RBC AUTO: 87.1 FL — SIGNIFICANT CHANGE UP (ref 80–100)
MONOCYTES # BLD AUTO: 0.26 K/UL — SIGNIFICANT CHANGE UP (ref 0–0.9)
MONOCYTES # BLD AUTO: 0.26 K/UL — SIGNIFICANT CHANGE UP (ref 0–0.9)
MONOCYTES NFR BLD AUTO: 5 % — SIGNIFICANT CHANGE UP (ref 2–14)
MONOCYTES NFR BLD AUTO: 5 % — SIGNIFICANT CHANGE UP (ref 2–14)
NEUTROPHILS # BLD AUTO: 3.75 K/UL — SIGNIFICANT CHANGE UP (ref 1.8–7.4)
NEUTROPHILS # BLD AUTO: 3.75 K/UL — SIGNIFICANT CHANGE UP (ref 1.8–7.4)
NEUTROPHILS NFR BLD AUTO: 72.6 % — SIGNIFICANT CHANGE UP (ref 43–77)
NEUTROPHILS NFR BLD AUTO: 72.6 % — SIGNIFICANT CHANGE UP (ref 43–77)
NRBC # BLD: 0 /100 WBCS — SIGNIFICANT CHANGE UP (ref 0–0)
NRBC # BLD: 0 /100 WBCS — SIGNIFICANT CHANGE UP (ref 0–0)
PHOSPHATE SERPL-MCNC: 2.4 MG/DL — LOW (ref 2.5–4.5)
PHOSPHATE SERPL-MCNC: 2.4 MG/DL — LOW (ref 2.5–4.5)
PLATELET # BLD AUTO: 231 K/UL — SIGNIFICANT CHANGE UP (ref 150–400)
PLATELET # BLD AUTO: 231 K/UL — SIGNIFICANT CHANGE UP (ref 150–400)
POTASSIUM SERPL-MCNC: 4 MMOL/L — SIGNIFICANT CHANGE UP (ref 3.5–5.3)
POTASSIUM SERPL-MCNC: 4 MMOL/L — SIGNIFICANT CHANGE UP (ref 3.5–5.3)
POTASSIUM SERPL-SCNC: 4 MMOL/L — SIGNIFICANT CHANGE UP (ref 3.5–5.3)
POTASSIUM SERPL-SCNC: 4 MMOL/L — SIGNIFICANT CHANGE UP (ref 3.5–5.3)
PROT SERPL-MCNC: 5.3 G/DL — LOW (ref 6–8.3)
PROT SERPL-MCNC: 5.3 G/DL — LOW (ref 6–8.3)
RBC # BLD: 3.4 M/UL — LOW (ref 3.8–5.2)
RBC # BLD: 3.4 M/UL — LOW (ref 3.8–5.2)
RBC # FLD: 15.4 % — HIGH (ref 10.3–14.5)
RBC # FLD: 15.4 % — HIGH (ref 10.3–14.5)
SODIUM SERPL-SCNC: 140 MMOL/L — SIGNIFICANT CHANGE UP (ref 135–145)
SODIUM SERPL-SCNC: 140 MMOL/L — SIGNIFICANT CHANGE UP (ref 135–145)
SPECIMEN SOURCE: SIGNIFICANT CHANGE UP
WBC # BLD: 5.17 K/UL — SIGNIFICANT CHANGE UP (ref 3.8–10.5)
WBC # BLD: 5.17 K/UL — SIGNIFICANT CHANGE UP (ref 3.8–10.5)
WBC # FLD AUTO: 5.17 K/UL — SIGNIFICANT CHANGE UP (ref 3.8–10.5)
WBC # FLD AUTO: 5.17 K/UL — SIGNIFICANT CHANGE UP (ref 3.8–10.5)

## 2024-01-03 PROCEDURE — 71045 X-RAY EXAM CHEST 1 VIEW: CPT | Mod: 26

## 2024-01-03 PROCEDURE — 99232 SBSQ HOSP IP/OBS MODERATE 35: CPT | Mod: GC

## 2024-01-03 RX ORDER — SODIUM,POTASSIUM PHOSPHATES 278-250MG
1 POWDER IN PACKET (EA) ORAL THREE TIMES A DAY
Refills: 0 | Status: COMPLETED | OUTPATIENT
Start: 2024-01-03 | End: 2024-01-04

## 2024-01-03 RX ORDER — SODIUM,POTASSIUM PHOSPHATES 278-250MG
1 POWDER IN PACKET (EA) ORAL THREE TIMES A DAY
Refills: 0 | Status: DISCONTINUED | OUTPATIENT
Start: 2024-01-03 | End: 2024-01-03

## 2024-01-03 RX ADMIN — Medication 1 MILLIGRAM(S): at 14:13

## 2024-01-03 RX ADMIN — SODIUM CHLORIDE 4 MILLILITER(S): 9 INJECTION INTRAMUSCULAR; INTRAVENOUS; SUBCUTANEOUS at 06:44

## 2024-01-03 RX ADMIN — Medication 500 MILLIGRAM(S): at 14:12

## 2024-01-03 RX ADMIN — SERTRALINE 25 MILLIGRAM(S): 25 TABLET, FILM COATED ORAL at 14:12

## 2024-01-03 RX ADMIN — PANTOPRAZOLE SODIUM 40 MILLIGRAM(S): 20 TABLET, DELAYED RELEASE ORAL at 05:35

## 2024-01-03 RX ADMIN — Medication 1 APPLICATION(S): at 14:19

## 2024-01-03 RX ADMIN — Medication 3 MILLILITER(S): at 05:35

## 2024-01-03 RX ADMIN — Medication 1 PACKET(S): at 22:32

## 2024-01-03 RX ADMIN — Medication 1 TABLET(S): at 14:12

## 2024-01-03 RX ADMIN — Medication 3 MILLILITER(S): at 23:11

## 2024-01-03 RX ADMIN — PANTOPRAZOLE SODIUM 40 MILLIGRAM(S): 20 TABLET, DELAYED RELEASE ORAL at 17:08

## 2024-01-03 RX ADMIN — Medication 3 MILLILITER(S): at 17:07

## 2024-01-03 RX ADMIN — Medication 100 MILLIGRAM(S): at 14:13

## 2024-01-03 RX ADMIN — SODIUM CHLORIDE 4 MILLILITER(S): 9 INJECTION INTRAMUSCULAR; INTRAVENOUS; SUBCUTANEOUS at 17:07

## 2024-01-03 RX ADMIN — Medication 3 MILLILITER(S): at 00:30

## 2024-01-03 RX ADMIN — Medication 56 MICROGRAM(S): at 22:31

## 2024-01-03 RX ADMIN — Medication 3 MILLILITER(S): at 14:13

## 2024-01-03 RX ADMIN — POLYETHYLENE GLYCOL 3350 17 GRAM(S): 17 POWDER, FOR SOLUTION ORAL at 14:14

## 2024-01-03 NOTE — PROGRESS NOTE ADULT - PROBLEM SELECTOR PLAN 5
Symptoms of lethargy/encephalopathy >48h with no PO intake, likely has chronic hypernatremia (vs acute) 2/2 severe dehydration. Na 166 on admission, improved w/ D5.   Resolved     - monitor BMP daily Symptoms of lethargy/encephalopathy >48h with no PO intake, likely has chronic hypernatremia (vs acute) 2/2 severe dehydration. Na 166 on admission, improved w/ D5.     - Resolved   - monitor BMP daily

## 2024-01-03 NOTE — PROGRESS NOTE ADULT - PROBLEM SELECTOR PLAN 4
Hx of dysphagia since 2-3 months ago per son. Notable for severe b/l ext contracture and cachexia. Low PO intake at baseline, no PO intake in the last 2-3 days. NGT placed 12/22, trickle feeds.   S&S: Puree/moderately thick liquids via 1/2 tsp amount only; however pt unlikely to get adequate calories. Repeat assessment recommend NPO.   NG tube removed during RRT overnight, will hold off on replacing NG tube given concern patient likely aspirated.     - NPO for now , high risk for aspiration  - Will re-evaluate daily for NGT placement, not placed today due to concern for bleeding  - will consult GI for PEG tube placement next week Hx of dysphagia since 2-3 months ago per son. Notable for severe b/l ext contracture and cachexia. Low PO intake at baseline, no PO intake in the last 2-3 days. NGT placed 12/22, trickle feeds.   S&S: Puree/moderately thick liquids via 1/2 tsp amount only; however pt unlikely to get adequate calories. Repeat assessment recommend NPO.   NG tube removed during RRT overnight, will hold off on replacing NG tube given concern patient likely aspirated.     - NPO for now , high risk for aspiration  - NGT replaced on 01/03  - GI consulted for PEG, ongoing GOC on final decision from family

## 2024-01-03 NOTE — CHART NOTE - NSCHARTNOTEFT_GEN_A_CORE
Nutrition Follow Up Note  Patient seen for: nutrition follow-up    Chart reviewed, events noted.    Source: [] Patient       [x] EMR        [] RN        [] Family at bedside       [] Other:    -If unable to interview patient: [] Trach/Vent/BiPAP  [x] Disoriented/confused/inappropriate to interview    Diet Order:   Diet, NPO with Tube Feed:   Tube Feeding Modality: Nasogastric  Glucerna 1.2 Vidal (GLUCERNARTH)  Total Volume for 24 Hours (mL): 1320  Continuous  Starting Tube Feed Rate {mL per Hour}: 55  Increase Tube Feed Rate by (mL): 0     Every 6 hours  Until Goal Tube Feed Rate (mL per Hour): 55  Tube Feed Duration (in Hours): 24  Tube Feed Start Time: 00:00  Carlos(7 Gm Arginine/7 Gm Glut/1.2 Gm HMB     Qty per Day:  2 (01-03-24)    - Is current order appropriate/adequate? [x] Yes  []  No:     - Nutrition-related concerns:  -pt most recently seen by Speech Language Pathologist on 12/28, recommended to remain "NPO w/ alternative means of nutrition/hydration/medication"   -RRT on 12/29 for hypoxia and hypotension, NGT not in place per x-ray, removed. NGT replacement on hold over the weekend, plan for GI consultation for PEG placement  -per discussion with GI, family wishes to continue to NGT and monitor for clinical progression; PEG deferred at this time.   -NGT replaced, cleared to resume feeds today      GI:  Last BM _12/27__.   Bowel Regimen? [x] Yes   [] No      Weights: no new weights to assess   40.8kg (12/20)    Nutritionally Pertinent MEDICATIONS  (STANDING):  ascorbic acid  dextrose 5% + lactated ringers.  folic acid  levothyroxine Injectable  multivitamin  pantoprazole  Injectable  polyethylene glycol 3350  potassium phosphate / sodium phosphate Powder (PHOS-NaK)  senna  thiamine    Pertinent Labs: 01-03 @ 07:11: Na 140, BUN <4<L>, Cr 0.34<L>, <H>, K+ 4.0, Phos 2.4<L>, Mg 2.4, Alk Phos 55, ALT/SGPT 17, AST/SGOT 19, HbA1c --    A1C with Estimated Average Glucose Result: 5.7 % (09-19-23 @ 10:33)    Finger Sticks:  POCT Blood Glucose.: 112 mg/dL (01-03 @ 12:01)  POCT Blood Glucose.: 92 mg/dL (01-03 @ 05:28)  POCT Blood Glucose.: 83 mg/dL (01-02 @ 23:53)  POCT Blood Glucose.: 92 mg/dL (01-02 @ 19:10)      Skin per nursing documentation:  "Hip & Spine deep tissue injury, Sacral/ Buttocks /Bilateral hip Unstageable pressure injury  Lt Shoulder DTI" per wound care note 12/28  Edema: +1 left/right leg     Estimated Needs:   [x] no change since previous assessment  [] recalculated:   Estimated Needs: based on IBW 52.1 kg  Energy: 1939-1336 kcal/day (30-35 kcal/kg)  Protein: 62-83 g Pro/day (1.2-1.6 g Pro/kg)  Fluid: defer to MD team    Previous Nutrition Diagnosis:   1) Underweight; Malnourished   2) Increased protein-energy needs   Nutrition Diagnosis is: ongoing --addressed with NGT feeds     New Nutrition Diagnosis: [x] Not applicable    Nutrition Care Plan:  [x] In Progress  [] Achieved  [] Not applicable    Nutrition Interventions:     Education Provided:       [] Yes:  [x] No:        Recommendations:         [x] Continue Glucerna 1.2 @ goal rate of 55 mL/hr x 24hrs. Provides 1584 kcal, 79.2 g Pro, 1062.6 mL water in 1320 mL total volume.      [x] Continue current micronutrient supplementation: multivitamin, ascorbic acid. Continue Carlos BID for wound healing     [x] Monitor GOC    Monitoring and Evaluation:   Continue to monitor nutritional intake, tolerance to diet prescription, weights, labs, skin integrity      RD remains available upon request and will follow up per protocol  Madelaine Terry RD, CDN, Available on Teams Nutrition Follow Up Note  Patient seen for: nutrition follow-up    Chart reviewed, events noted.    Source: [] Patient       [x] EMR        [] RN        [] Family at bedside       [] Other:    -If unable to interview patient: [] Trach/Vent/BiPAP  [x] Disoriented/confused/inappropriate to interview    Diet Order:   Diet, NPO with Tube Feed:   Tube Feeding Modality: Nasogastric  Glucerna 1.2 Vidal (GLUCERNARTH)  Total Volume for 24 Hours (mL): 1320  Continuous  Starting Tube Feed Rate {mL per Hour}: 55  Increase Tube Feed Rate by (mL): 0     Every 6 hours  Until Goal Tube Feed Rate (mL per Hour): 55  Tube Feed Duration (in Hours): 24  Tube Feed Start Time: 00:00  Carlos(7 Gm Arginine/7 Gm Glut/1.2 Gm HMB     Qty per Day:  2 (01-03-24)    - Is current order appropriate/adequate? [x] Yes  []  No:     - Nutrition-related concerns:  -pt most recently seen by Speech Language Pathologist on 12/28, recommended to remain "NPO w/ alternative means of nutrition/hydration/medication"   -RRT on 12/29 for hypoxia and hypotension, NGT not in place per x-ray, removed. NGT replacement on hold over the weekend, plan for GI consultation for PEG placement  -per discussion with GI, family wishes to continue to NGT and monitor for clinical progression; PEG deferred at this time.   -NGT replaced, cleared to resume feeds today      GI:  Last BM _12/27__.   Bowel Regimen? [x] Yes   [] No      Weights: no new weights to assess   40.8kg (12/20)    Nutritionally Pertinent MEDICATIONS  (STANDING):  ascorbic acid  dextrose 5% + lactated ringers.  folic acid  levothyroxine Injectable  multivitamin  pantoprazole  Injectable  polyethylene glycol 3350  potassium phosphate / sodium phosphate Powder (PHOS-NaK)  senna  thiamine    Pertinent Labs: 01-03 @ 07:11: Na 140, BUN <4<L>, Cr 0.34<L>, <H>, K+ 4.0, Phos 2.4<L>, Mg 2.4, Alk Phos 55, ALT/SGPT 17, AST/SGOT 19, HbA1c --    A1C with Estimated Average Glucose Result: 5.7 % (09-19-23 @ 10:33)    Finger Sticks:  POCT Blood Glucose.: 112 mg/dL (01-03 @ 12:01)  POCT Blood Glucose.: 92 mg/dL (01-03 @ 05:28)  POCT Blood Glucose.: 83 mg/dL (01-02 @ 23:53)  POCT Blood Glucose.: 92 mg/dL (01-02 @ 19:10)      Skin per nursing documentation:  "Hip & Spine deep tissue injury, Sacral/ Buttocks /Bilateral hip Unstageable pressure injury  Lt Shoulder DTI" per wound care note 12/28  Edema: +1 left/right leg     Estimated Needs:   [x] no change since previous assessment  [] recalculated:   Estimated Needs: based on IBW 52.1 kg  Energy: 6839-5688 kcal/day (30-35 kcal/kg)  Protein: 62-83 g Pro/day (1.2-1.6 g Pro/kg)  Fluid: defer to MD team    Previous Nutrition Diagnosis:   1) Underweight; Malnourished   2) Increased protein-energy needs   Nutrition Diagnosis is: ongoing --addressed with NGT feeds     New Nutrition Diagnosis: [x] Not applicable    Nutrition Care Plan:  [x] In Progress  [] Achieved  [] Not applicable    Nutrition Interventions:     Education Provided:       [] Yes:  [x] No:        Recommendations:         [x] Continue Glucerna 1.2 @ goal rate of 55 mL/hr x 24hrs. Provides 1584 kcal, 79.2 g Pro, 1062.6 mL water in 1320 mL total volume.      [x] Continue current micronutrient supplementation: multivitamin, ascorbic acid. Continue Carlos BID for wound healing     [x] Monitor GOC    Monitoring and Evaluation:   Continue to monitor nutritional intake, tolerance to diet prescription, weights, labs, skin integrity      RD remains available upon request and will follow up per protocol  Madelaine Terry RD, CDN, Available on Teams

## 2024-01-03 NOTE — PROGRESS NOTE ADULT - PROBLEM SELECTOR PLAN 3
-Patient is anemic chronically, with recent Hgb drop  -Repeat CBC showing stable Hgb  -Ordered CT C/A/P. will Follow -Patient is anemic chronically, with recent Hgb drop  -Repeat CBC showing stable Hgb  -Ordered CT C/A/P: no signs of active bleeding or hematoma   -Hgb stable and improved

## 2024-01-03 NOTE — CHART NOTE - NSCHARTNOTEFT_GEN_A_CORE
68 year old female with history of breast cancer s/p L mastectomy, TBI (s/p a fall ~2 years ago) c/b aphasia, meningioma, hypothyroidism (hx Grave's dz s/p radioactive iodine), CVA, and dysphagia who presents to [facility] ED with increased lethargy and failure to thrive with initial sodium 166 [decreased to 141 within 48 hours].  Per son upon admission, patient developed inability to speak or tolerate food.  Hospital course c/b acute hypoxic respiratory failure and possible aspiration pneumonitis/pneumonia.      # Evaluation of PEG  # Hypernatremia, improved  # Acute hypoxic respiratory failure, improving  # Aspiration pneumonia    Recommendations:  -discussed at length with family, including patient's  Silverio Arthur; he states patient has history of TBI ~2 years ago and is aphasic at baseline however still is above to converse and interact with you; he endorses an acute change in the few days leading up to hospitalization; in light of these facts, patient's family elects to pursue NG tube placement and monitor for clinical progression; they wish to defer PEG tube placement at this time.  -will sign off, please call with any further questions    Mika Franklin  GI/Hepatology Fellow    MONDAY-FRIDAY 8AM-5PM:  Pager# [phone] ([facility]) or [phone] ([facility])    NON-URGENT CONSULTS:  Please email [email or web address]  AT NIGHT AND ON WEEKENDS:  Contact on-call GI fellow via answering service ([phone]) from 5pm-8am and on weekends/holidays 68 year old female with history of breast cancer s/p L mastectomy, TBI (s/p a fall ~2 years ago) c/b aphasia, meningioma, hypothyroidism (hx Grave's dz s/p radioactive iodine), CVA, and dysphagia who presents to [facility] ED with increased lethargy and failure to thrive with initial sodium 166 [decreased to 141 within 48 hours].  Per son upon admission, patient developed inability to speak or tolerate food.  Hospital course c/b acute hypoxic respiratory failure and possible aspiration pneumonitis/pneumonia.      # Evaluation of PEG  # Hypernatremia, improved  # Acute hypoxic respiratory failure, improving  # Aspiration pneumonia    Recommendations:  -discussed at length with family, including patient's  Silverio Arthur; he states patient has history of TBI ~2 years ago and is aphasic at baseline however still is above to converse and interact with you; he endorses an acute change in the few days leading up to hospitalization; in light of these facts, patient's family elects to pursue NG tube placement and monitor for clinical progression; they wish to defer PEG tube placement at this time.  -will sign off, please call with any further questions    Mika Franklin  GI/Hepatology Fellow    MONDAY-FRIDAY 8AM-5PM:  Pager# [phone] ([facility]) or [phone] ([facility])    NON-URGENT CONSULTS:  Please email [email or web address]  AT NIGHT AND ON WEEKENDS:  Contact on-call GI fellow via answering service ([phone]) from 5pm-8am and on weekends/holidays

## 2024-01-03 NOTE — PROGRESS NOTE ADULT - PROBLEM SELECTOR PLAN 1
RRT on 12/28 for hypoxia, currently on HFNC. Suspect likely aspiration pneumonitis. CXR negative for focal consolidations.    - wean o2 as tolerated, currently on RA  - continue zosyn for 5 days  (12/29 - )  - f/u blood cultures RRT on 12/28 for hypoxia, currently on HFNC. Suspect likely aspiration pneumonitis. CXR negative for focal consolidations.    - wean o2 as tolerated, currently on RA  - continue zosyn for 5 days  (12/29 -1/2 )  - f/u blood cultures

## 2024-01-03 NOTE — PROGRESS NOTE ADULT - PROBLEM SELECTOR PLAN 2
Hx of recurrent UTI (last admission 9/2023). Recent urine culture showing 100k Ecoli. S/p IV ceftriaxone (12/21-23). Febrile overnight 12/27, repeat U/A positive. Restarted on ceftriaxone (12/28-29) but escalated in setting of ongoing aspiration pneumonia.     - now on zosyn Hx of recurrent UTI (last admission 9/2023). Recent urine culture showing 100k Ecoli. S/p IV ceftriaxone (12/21-23). Febrile overnight 12/27, repeat U/A positive. Restarted on ceftriaxone (12/28-29) but escalated in setting of ongoing aspiration pneumonia.     - s/p Zosyn (12/29-1/2)

## 2024-01-03 NOTE — PROGRESS NOTE ADULT - ATTENDING COMMENTS
Acute hypoxic respiratory failure secondary to likely aspiration- resolved- complete 5 days Zosyn  Dysphagia of unclear etiology with severe malnutrition- GI assessing for PEG- will replace NGT today to resume feeding/free water  Hypernatremia- continue IVF- transition to free water boluses   Functional quadriplegia with multiple pressure ulcers- wound care following   Hypothyroidism- Continue levothyroxine IV 56 mcg QD- transition back to enteral once route secured   Palliative care consulted- spouse requested further evaluation and treatement for reversible causes of current conditions  Prognosis guarded- remains full code

## 2024-01-03 NOTE — PROGRESS NOTE ADULT - NUTRITIONAL ASSESSMENT
This patient has been assessed with a concern for Malnutrition and has been determined to have a diagnosis/diagnoses of Severe protein-calorie malnutrition and Underweight (BMI < 19).    This patient is being managed with:   Diet NPO-  Entered: Jan 2 2024  2:13PM

## 2024-01-03 NOTE — PROGRESS NOTE ADULT - ASSESSMENT
68F with PMH significant for breast cancer s/p L mastectomy, TBI (s/p a fall ~2 years ago) c/b aphasia, meningioma, hypothyroidism (hx Grave's dz s/p radioactive iodine), CVA, and dysphagia who presents to [facility] ED on 12/20/2023 with no PO intake and increased lethargy in the last 2-3 days, admitted for hypernatremia 166 and UTI. Hospital course complicated by AHRF likely 2/2 to aspiration pneumonitis.    68F with PMH significant for breast cancer s/p L mastectomy, TBI (s/p a fall ~2 years ago) c/b aphasia, meningioma, hypothyroidism (hx Grave's dz s/p radioactive iodine), CVA, and dysphagia who presents to [facility] ED on 12/20/2023 with no PO intake and increased lethargy in the last 2-3 days, admitted for hypernatremia 166 and UTI. Hospital course complicated by AHRF likely 2/2 to aspiration pneumonitis.

## 2024-01-03 NOTE — PROGRESS NOTE ADULT - SUBJECTIVE AND OBJECTIVE BOX
***********************************************  Kelvin Fisher MD  Internal Medicine   PGY1  ***********************************************      PROGRESS NOTE:     Patient is a 68y old  Female who presents with a chief complaint of No PO intake, lethargy (02 Jan 2024 11:24)      SUBJECTIVE / OVERNIGHT EVENTS:    OVERNIGHT:       Patient examined at bedside        MEDICATIONS  (STANDING):  albuterol/ipratropium for Nebulization 3 milliLiter(s) Nebulizer every 6 hours  ascorbic acid 500 milliGRAM(s) Oral daily  collagenase Ointment 1 Application(s) Topical daily  dextrose 5% + lactated ringers. 1000 milliLiter(s) (50 mL/Hr) IV Continuous <Continuous>  folic acid 1 milliGRAM(s) Oral daily  levothyroxine Injectable 56 MICROGram(s) IV Push at bedtime  multivitamin 1 Tablet(s) Oral daily  pantoprazole  Injectable 40 milliGRAM(s) IV Push two times a day  polyethylene glycol 3350 17 Gram(s) Oral daily  senna 2 Tablet(s) Oral at bedtime  sertraline 25 milliGRAM(s) Oral daily  sodium chloride 3%  Inhalation 4 milliLiter(s) Inhalation every 12 hours  thiamine 100 milliGRAM(s) Oral daily    MEDICATIONS  (PRN):      CAPILLARY BLOOD GLUCOSE      POCT Blood Glucose.: 92 mg/dL (03 Jan 2024 05:28)  POCT Blood Glucose.: 83 mg/dL (02 Jan 2024 23:53)  POCT Blood Glucose.: 92 mg/dL (02 Jan 2024 19:10)  POCT Blood Glucose.: 110 mg/dL (02 Jan 2024 12:06)    I&O's Summary    02 Jan 2024 07:01  -  03 Jan 2024 07:00  --------------------------------------------------------  IN: 0 mL / OUT: 650 mL / NET: -650 mL        PHYSICAL EXAM:  Vital Signs Last 24 Hrs  T(C): 36.5 (03 Jan 2024 04:32), Max: 36.6 (02 Jan 2024 16:41)  T(F): 97.7 (03 Jan 2024 04:32), Max: 97.9 (02 Jan 2024 16:41)  HR: 66 (03 Jan 2024 04:32) (66 - 71)  BP: 110/73 (03 Jan 2024 04:32) (99/62 - 110/73)  BP(mean): --  RR: 18 (03 Jan 2024 04:32) (18 - 18)  SpO2: 99% (03 Jan 2024 04:32) (98% - 99%)    Parameters below as of 03 Jan 2024 04:32  Patient On (Oxygen Delivery Method): nasal cannula  O2 Flow (L/min): 1      CONSTITUTIONAL: NAD; well-developed  HEENT: PERRL, clear conjunctiva  RESPIRATORY: Normal respiratory effort; lungs are clear to auscultation bilaterally; No Crackles/Rhonchi/Wheezing  CARDIOVASCULAR: Regular rate and rhythm, normal S1 and S2, no murmur/rub/gallop; No lower extremity edema; Peripheral pulses are 2+ bilaterally  ABDOMEN: Nontender to palpation, normoactive bowel sounds, no rebound/guarding; No hepatosplenomegaly  MUSCULOSKELETAL: no clubbing or cyanosis of digits; no joint swelling or tenderness to palpation  EXTREMITY: Lower extremities Non-tender to palpation; non-erythematous B/L  NEURO: A&Ox3; no focal deficits   PSYCH: normal mood; Affect appropirate    LABS:                        9.1    5.17  )-----------( 231      ( 03 Jan 2024 07:12 )             29.6     01-02    138  |  105  |  <4<L>  ----------------------------<  1098<HH>  3.7   |  20<L>  |  <0.30<L>    Ca    7.3<L>      02 Jan 2024 06:51  Phos  1.6     01-02  Mg     1.5     01-02    TPro  3.9<L>  /  Alb  1.8<L>  /  TBili  0.3  /  DBili  x   /  AST  7<L>  /  ALT  10  /  AlkPhos  40  01-02    PT/INR - ( 02 Jan 2024 06:51 )   PT: 12.8 sec;   INR: 1.17 ratio         PTT - ( 02 Jan 2024 06:51 )  PTT:25.2 sec      Urinalysis Basic - ( 02 Jan 2024 06:51 )    Color: x / Appearance: x / SG: x / pH: x  Gluc: 1098 mg/dL / Ketone: x  / Bili: x / Urobili: x   Blood: x / Protein: x / Nitrite: x   Leuk Esterase: x / RBC: x / WBC x   Sq Epi: x / Non Sq Epi: x / Bacteria: x          RADIOLOGY & ADDITIONAL TESTS:  Results Reviewed:   Imaging Personally Reviewed:  Electrocardiogram Personally Reviewed:    COORDINATION OF CARE:  Care Discussed with Consultants/Other Providers [Y/N]:  Prior or Outpatient Records Reviewed [Y/N]:   ***********************************************  Kelvin Fisher MD  Internal Medicine   PGY1  ***********************************************      PROGRESS NOTE:     Patient is a 68y old  Female who presents with a chief complaint of No PO intake, lethargy (02 Jan 2024 11:24)      SUBJECTIVE / OVERNIGHT EVENTS:    OVERNIGHT: No overnight events      Patient examined at bedside resting in bed. ROS unable to obtain due to mental status.         MEDICATIONS  (STANDING):  albuterol/ipratropium for Nebulization 3 milliLiter(s) Nebulizer every 6 hours  ascorbic acid 500 milliGRAM(s) Oral daily  collagenase Ointment 1 Application(s) Topical daily  dextrose 5% + lactated ringers. 1000 milliLiter(s) (50 mL/Hr) IV Continuous <Continuous>  folic acid 1 milliGRAM(s) Oral daily  levothyroxine Injectable 56 MICROGram(s) IV Push at bedtime  multivitamin 1 Tablet(s) Oral daily  pantoprazole  Injectable 40 milliGRAM(s) IV Push two times a day  polyethylene glycol 3350 17 Gram(s) Oral daily  senna 2 Tablet(s) Oral at bedtime  sertraline 25 milliGRAM(s) Oral daily  sodium chloride 3%  Inhalation 4 milliLiter(s) Inhalation every 12 hours  thiamine 100 milliGRAM(s) Oral daily    MEDICATIONS  (PRN):      CAPILLARY BLOOD GLUCOSE      POCT Blood Glucose.: 92 mg/dL (03 Jan 2024 05:28)  POCT Blood Glucose.: 83 mg/dL (02 Jan 2024 23:53)  POCT Blood Glucose.: 92 mg/dL (02 Jan 2024 19:10)  POCT Blood Glucose.: 110 mg/dL (02 Jan 2024 12:06)    I&O's Summary    02 Jan 2024 07:01  -  03 Jan 2024 07:00  --------------------------------------------------------  IN: 0 mL / OUT: 650 mL / NET: -650 mL        PHYSICAL EXAM:  Vital Signs Last 24 Hrs  T(C): 36.5 (03 Jan 2024 04:32), Max: 36.6 (02 Jan 2024 16:41)  T(F): 97.7 (03 Jan 2024 04:32), Max: 97.9 (02 Jan 2024 16:41)  HR: 66 (03 Jan 2024 04:32) (66 - 71)  BP: 110/73 (03 Jan 2024 04:32) (99/62 - 110/73)  BP(mean): --  RR: 18 (03 Jan 2024 04:32) (18 - 18)  SpO2: 99% (03 Jan 2024 04:32) (98% - 99%)    Parameters below as of 03 Jan 2024 04:32  Patient On (Oxygen Delivery Method): nasal cannula  O2 Flow (L/min): 1      CONSTITUTIONAL: NAD; well-developed  HEENT: PERRL, clear conjunctiva  RESPIRATORY: Normal respiratory effort; lungs are clear to auscultation bilaterally; No Crackles/Rhonchi/Wheezing  CARDIOVASCULAR: Regular rate and rhythm, normal S1 and S2, no murmur/rub/gallop; No lower extremity edema; Peripheral pulses are 2+ bilaterally  ABDOMEN: Nontender to palpation, normoactive bowel sounds, no rebound/guarding; No hepatosplenomegaly  MUSCULOSKELETAL: no clubbing or cyanosis of digits; no joint swelling or tenderness to palpation  EXTREMITY: Lower extremities Non-tender to palpation; non-erythematous B/L  NEURO: A&Ox0; no focal deficits       LABS:                        9.1    5.17  )-----------( 231      ( 03 Jan 2024 07:12 )             29.6     01-02    138  |  105  |  <4<L>  ----------------------------<  1098<HH>  3.7   |  20<L>  |  <0.30<L>    Ca    7.3<L>      02 Jan 2024 06:51  Phos  1.6     01-02  Mg     1.5     01-02    TPro  3.9<L>  /  Alb  1.8<L>  /  TBili  0.3  /  DBili  x   /  AST  7<L>  /  ALT  10  /  AlkPhos  40  01-02    PT/INR - ( 02 Jan 2024 06:51 )   PT: 12.8 sec;   INR: 1.17 ratio         PTT - ( 02 Jan 2024 06:51 )  PTT:25.2 sec      Urinalysis Basic - ( 02 Jan 2024 06:51 )    Color: x / Appearance: x / SG: x / pH: x  Gluc: 1098 mg/dL / Ketone: x  / Bili: x / Urobili: x   Blood: x / Protein: x / Nitrite: x   Leuk Esterase: x / RBC: x / WBC x   Sq Epi: x / Non Sq Epi: x / Bacteria: x          RADIOLOGY & ADDITIONAL TESTS:  Results Reviewed:   Imaging Personally Reviewed:  Electrocardiogram Personally Reviewed:    COORDINATION OF CARE:  Care Discussed with Consultants/Other Providers [Y/N]:  Prior or Outpatient Records Reviewed [Y/N]:

## 2024-01-04 LAB
ALBUMIN SERPL ELPH-MCNC: 2.7 G/DL — LOW (ref 3.3–5)
ALBUMIN SERPL ELPH-MCNC: 2.7 G/DL — LOW (ref 3.3–5)
ALP SERPL-CCNC: 86 U/L — SIGNIFICANT CHANGE UP (ref 40–120)
ALP SERPL-CCNC: 86 U/L — SIGNIFICANT CHANGE UP (ref 40–120)
ALT FLD-CCNC: 21 U/L — SIGNIFICANT CHANGE UP (ref 10–45)
ALT FLD-CCNC: 21 U/L — SIGNIFICANT CHANGE UP (ref 10–45)
ANION GAP SERPL CALC-SCNC: 12 MMOL/L — SIGNIFICANT CHANGE UP (ref 5–17)
ANION GAP SERPL CALC-SCNC: 12 MMOL/L — SIGNIFICANT CHANGE UP (ref 5–17)
ANION GAP SERPL CALC-SCNC: 9 MMOL/L — SIGNIFICANT CHANGE UP (ref 5–17)
ANION GAP SERPL CALC-SCNC: 9 MMOL/L — SIGNIFICANT CHANGE UP (ref 5–17)
AST SERPL-CCNC: 16 U/L — SIGNIFICANT CHANGE UP (ref 10–40)
AST SERPL-CCNC: 16 U/L — SIGNIFICANT CHANGE UP (ref 10–40)
BASOPHILS # BLD AUTO: 0.01 K/UL — SIGNIFICANT CHANGE UP (ref 0–0.2)
BASOPHILS # BLD AUTO: 0.01 K/UL — SIGNIFICANT CHANGE UP (ref 0–0.2)
BASOPHILS NFR BLD AUTO: 0.1 % — SIGNIFICANT CHANGE UP (ref 0–2)
BASOPHILS NFR BLD AUTO: 0.1 % — SIGNIFICANT CHANGE UP (ref 0–2)
BILIRUB SERPL-MCNC: 0.4 MG/DL — SIGNIFICANT CHANGE UP (ref 0.2–1.2)
BILIRUB SERPL-MCNC: 0.4 MG/DL — SIGNIFICANT CHANGE UP (ref 0.2–1.2)
BUN SERPL-MCNC: 11 MG/DL — SIGNIFICANT CHANGE UP (ref 7–23)
CALCIUM SERPL-MCNC: 7.5 MG/DL — LOW (ref 8.4–10.5)
CALCIUM SERPL-MCNC: 7.5 MG/DL — LOW (ref 8.4–10.5)
CALCIUM SERPL-MCNC: 8 MG/DL — LOW (ref 8.4–10.5)
CALCIUM SERPL-MCNC: 8 MG/DL — LOW (ref 8.4–10.5)
CHLORIDE SERPL-SCNC: 103 MMOL/L — SIGNIFICANT CHANGE UP (ref 96–108)
CHLORIDE SERPL-SCNC: 103 MMOL/L — SIGNIFICANT CHANGE UP (ref 96–108)
CHLORIDE SERPL-SCNC: 111 MMOL/L — HIGH (ref 96–108)
CHLORIDE SERPL-SCNC: 111 MMOL/L — HIGH (ref 96–108)
CO2 SERPL-SCNC: 23 MMOL/L — SIGNIFICANT CHANGE UP (ref 22–31)
CREAT SERPL-MCNC: 0.34 MG/DL — LOW (ref 0.5–1.3)
CREAT SERPL-MCNC: 0.34 MG/DL — LOW (ref 0.5–1.3)
CREAT SERPL-MCNC: <0.3 MG/DL — LOW (ref 0.5–1.3)
CREAT SERPL-MCNC: <0.3 MG/DL — LOW (ref 0.5–1.3)
EGFR: 111 ML/MIN/1.73M2 — SIGNIFICANT CHANGE UP
EGFR: 111 ML/MIN/1.73M2 — SIGNIFICANT CHANGE UP
EGFR: 114 ML/MIN/1.73M2 — SIGNIFICANT CHANGE UP
EGFR: 114 ML/MIN/1.73M2 — SIGNIFICANT CHANGE UP
EOSINOPHIL # BLD AUTO: 0.11 K/UL — SIGNIFICANT CHANGE UP (ref 0–0.5)
EOSINOPHIL # BLD AUTO: 0.11 K/UL — SIGNIFICANT CHANGE UP (ref 0–0.5)
EOSINOPHIL NFR BLD AUTO: 1.6 % — SIGNIFICANT CHANGE UP (ref 0–6)
EOSINOPHIL NFR BLD AUTO: 1.6 % — SIGNIFICANT CHANGE UP (ref 0–6)
GLUCOSE BLDC GLUCOMTR-MCNC: 115 MG/DL — HIGH (ref 70–99)
GLUCOSE BLDC GLUCOMTR-MCNC: 115 MG/DL — HIGH (ref 70–99)
GLUCOSE BLDC GLUCOMTR-MCNC: 128 MG/DL — HIGH (ref 70–99)
GLUCOSE BLDC GLUCOMTR-MCNC: 128 MG/DL — HIGH (ref 70–99)
GLUCOSE BLDC GLUCOMTR-MCNC: 139 MG/DL — HIGH (ref 70–99)
GLUCOSE BLDC GLUCOMTR-MCNC: 139 MG/DL — HIGH (ref 70–99)
GLUCOSE BLDC GLUCOMTR-MCNC: 166 MG/DL — HIGH (ref 70–99)
GLUCOSE BLDC GLUCOMTR-MCNC: 166 MG/DL — HIGH (ref 70–99)
GLUCOSE SERPL-MCNC: 128 MG/DL — HIGH (ref 70–99)
GLUCOSE SERPL-MCNC: 128 MG/DL — HIGH (ref 70–99)
GLUCOSE SERPL-MCNC: 129 MG/DL — HIGH (ref 70–99)
GLUCOSE SERPL-MCNC: 129 MG/DL — HIGH (ref 70–99)
HCT VFR BLD CALC: 31.2 % — LOW (ref 34.5–45)
HCT VFR BLD CALC: 31.2 % — LOW (ref 34.5–45)
HGB BLD-MCNC: 10.2 G/DL — LOW (ref 11.5–15.5)
HGB BLD-MCNC: 10.2 G/DL — LOW (ref 11.5–15.5)
IMM GRANULOCYTES NFR BLD AUTO: 0.6 % — SIGNIFICANT CHANGE UP (ref 0–0.9)
IMM GRANULOCYTES NFR BLD AUTO: 0.6 % — SIGNIFICANT CHANGE UP (ref 0–0.9)
LYMPHOCYTES # BLD AUTO: 1.3 K/UL — SIGNIFICANT CHANGE UP (ref 1–3.3)
LYMPHOCYTES # BLD AUTO: 1.3 K/UL — SIGNIFICANT CHANGE UP (ref 1–3.3)
LYMPHOCYTES # BLD AUTO: 19.1 % — SIGNIFICANT CHANGE UP (ref 13–44)
LYMPHOCYTES # BLD AUTO: 19.1 % — SIGNIFICANT CHANGE UP (ref 13–44)
MAGNESIUM SERPL-MCNC: 2.1 MG/DL — SIGNIFICANT CHANGE UP (ref 1.6–2.6)
MAGNESIUM SERPL-MCNC: 2.1 MG/DL — SIGNIFICANT CHANGE UP (ref 1.6–2.6)
MAGNESIUM SERPL-MCNC: 2.3 MG/DL — SIGNIFICANT CHANGE UP (ref 1.6–2.6)
MAGNESIUM SERPL-MCNC: 2.3 MG/DL — SIGNIFICANT CHANGE UP (ref 1.6–2.6)
MCHC RBC-ENTMCNC: 27.1 PG — SIGNIFICANT CHANGE UP (ref 27–34)
MCHC RBC-ENTMCNC: 27.1 PG — SIGNIFICANT CHANGE UP (ref 27–34)
MCHC RBC-ENTMCNC: 32.7 GM/DL — SIGNIFICANT CHANGE UP (ref 32–36)
MCHC RBC-ENTMCNC: 32.7 GM/DL — SIGNIFICANT CHANGE UP (ref 32–36)
MCV RBC AUTO: 83 FL — SIGNIFICANT CHANGE UP (ref 80–100)
MCV RBC AUTO: 83 FL — SIGNIFICANT CHANGE UP (ref 80–100)
MONOCYTES # BLD AUTO: 0.35 K/UL — SIGNIFICANT CHANGE UP (ref 0–0.9)
MONOCYTES # BLD AUTO: 0.35 K/UL — SIGNIFICANT CHANGE UP (ref 0–0.9)
MONOCYTES NFR BLD AUTO: 5.1 % — SIGNIFICANT CHANGE UP (ref 2–14)
MONOCYTES NFR BLD AUTO: 5.1 % — SIGNIFICANT CHANGE UP (ref 2–14)
NEUTROPHILS # BLD AUTO: 5.01 K/UL — SIGNIFICANT CHANGE UP (ref 1.8–7.4)
NEUTROPHILS # BLD AUTO: 5.01 K/UL — SIGNIFICANT CHANGE UP (ref 1.8–7.4)
NEUTROPHILS NFR BLD AUTO: 73.5 % — SIGNIFICANT CHANGE UP (ref 43–77)
NEUTROPHILS NFR BLD AUTO: 73.5 % — SIGNIFICANT CHANGE UP (ref 43–77)
NRBC # BLD: 0 /100 WBCS — SIGNIFICANT CHANGE UP (ref 0–0)
NRBC # BLD: 0 /100 WBCS — SIGNIFICANT CHANGE UP (ref 0–0)
PHOSPHATE SERPL-MCNC: 1.8 MG/DL — LOW (ref 2.5–4.5)
PHOSPHATE SERPL-MCNC: 1.8 MG/DL — LOW (ref 2.5–4.5)
PHOSPHATE SERPL-MCNC: 3.3 MG/DL — SIGNIFICANT CHANGE UP (ref 2.5–4.5)
PHOSPHATE SERPL-MCNC: 3.3 MG/DL — SIGNIFICANT CHANGE UP (ref 2.5–4.5)
PLATELET # BLD AUTO: 323 K/UL — SIGNIFICANT CHANGE UP (ref 150–400)
PLATELET # BLD AUTO: 323 K/UL — SIGNIFICANT CHANGE UP (ref 150–400)
POTASSIUM SERPL-MCNC: 3.6 MMOL/L — SIGNIFICANT CHANGE UP (ref 3.5–5.3)
POTASSIUM SERPL-MCNC: 3.6 MMOL/L — SIGNIFICANT CHANGE UP (ref 3.5–5.3)
POTASSIUM SERPL-MCNC: 4.2 MMOL/L — SIGNIFICANT CHANGE UP (ref 3.5–5.3)
POTASSIUM SERPL-MCNC: 4.2 MMOL/L — SIGNIFICANT CHANGE UP (ref 3.5–5.3)
POTASSIUM SERPL-SCNC: 3.6 MMOL/L — SIGNIFICANT CHANGE UP (ref 3.5–5.3)
POTASSIUM SERPL-SCNC: 3.6 MMOL/L — SIGNIFICANT CHANGE UP (ref 3.5–5.3)
POTASSIUM SERPL-SCNC: 4.2 MMOL/L — SIGNIFICANT CHANGE UP (ref 3.5–5.3)
POTASSIUM SERPL-SCNC: 4.2 MMOL/L — SIGNIFICANT CHANGE UP (ref 3.5–5.3)
PROT SERPL-MCNC: 6.1 G/DL — SIGNIFICANT CHANGE UP (ref 6–8.3)
PROT SERPL-MCNC: 6.1 G/DL — SIGNIFICANT CHANGE UP (ref 6–8.3)
RBC # BLD: 3.76 M/UL — LOW (ref 3.8–5.2)
RBC # BLD: 3.76 M/UL — LOW (ref 3.8–5.2)
RBC # FLD: 15.5 % — HIGH (ref 10.3–14.5)
RBC # FLD: 15.5 % — HIGH (ref 10.3–14.5)
SODIUM SERPL-SCNC: 138 MMOL/L — SIGNIFICANT CHANGE UP (ref 135–145)
SODIUM SERPL-SCNC: 138 MMOL/L — SIGNIFICANT CHANGE UP (ref 135–145)
SODIUM SERPL-SCNC: 143 MMOL/L — SIGNIFICANT CHANGE UP (ref 135–145)
SODIUM SERPL-SCNC: 143 MMOL/L — SIGNIFICANT CHANGE UP (ref 135–145)
WBC # BLD: 6.82 K/UL — SIGNIFICANT CHANGE UP (ref 3.8–10.5)
WBC # BLD: 6.82 K/UL — SIGNIFICANT CHANGE UP (ref 3.8–10.5)
WBC # FLD AUTO: 6.82 K/UL — SIGNIFICANT CHANGE UP (ref 3.8–10.5)
WBC # FLD AUTO: 6.82 K/UL — SIGNIFICANT CHANGE UP (ref 3.8–10.5)

## 2024-01-04 PROCEDURE — 70553 MRI BRAIN STEM W/O & W/DYE: CPT | Mod: 26

## 2024-01-04 PROCEDURE — 99233 SBSQ HOSP IP/OBS HIGH 50: CPT

## 2024-01-04 PROCEDURE — 99232 SBSQ HOSP IP/OBS MODERATE 35: CPT | Mod: GC

## 2024-01-04 RX ORDER — PANTOPRAZOLE SODIUM 20 MG/1
40 TABLET, DELAYED RELEASE ORAL DAILY
Refills: 0 | Status: DISCONTINUED | OUTPATIENT
Start: 2024-01-04 | End: 2024-01-12

## 2024-01-04 RX ADMIN — Medication 1 TABLET(S): at 13:52

## 2024-01-04 RX ADMIN — SODIUM CHLORIDE 4 MILLILITER(S): 9 INJECTION INTRAMUSCULAR; INTRAVENOUS; SUBCUTANEOUS at 05:08

## 2024-01-04 RX ADMIN — SENNA PLUS 2 TABLET(S): 8.6 TABLET ORAL at 23:42

## 2024-01-04 RX ADMIN — Medication 1 PACKET(S): at 05:08

## 2024-01-04 RX ADMIN — Medication 1 PACKET(S): at 13:52

## 2024-01-04 RX ADMIN — Medication 500 MILLIGRAM(S): at 13:51

## 2024-01-04 RX ADMIN — Medication 1 APPLICATION(S): at 13:53

## 2024-01-04 RX ADMIN — POLYETHYLENE GLYCOL 3350 17 GRAM(S): 17 POWDER, FOR SOLUTION ORAL at 13:51

## 2024-01-04 RX ADMIN — SERTRALINE 25 MILLIGRAM(S): 25 TABLET, FILM COATED ORAL at 13:52

## 2024-01-04 RX ADMIN — Medication 1 MILLIGRAM(S): at 13:52

## 2024-01-04 RX ADMIN — Medication 3 MILLILITER(S): at 13:51

## 2024-01-04 RX ADMIN — Medication 3 MILLILITER(S): at 05:08

## 2024-01-04 RX ADMIN — PANTOPRAZOLE SODIUM 40 MILLIGRAM(S): 20 TABLET, DELAYED RELEASE ORAL at 05:08

## 2024-01-04 RX ADMIN — Medication 56 MICROGRAM(S): at 21:12

## 2024-01-04 RX ADMIN — Medication 85 MILLIMOLE(S): at 10:14

## 2024-01-04 RX ADMIN — Medication 100 MILLIGRAM(S): at 13:52

## 2024-01-04 NOTE — PROGRESS NOTE ADULT - PROBLEM SELECTOR PLAN 3
-Patient is anemic chronically, with recent Hgb drop  -Repeat CBC showing stable Hgb  -Ordered CT C/A/P: no signs of active bleeding or hematoma   -Hgb stable and improved

## 2024-01-04 NOTE — PROGRESS NOTE ADULT - NUTRITIONAL ASSESSMENT
This patient has been assessed with a concern for Malnutrition and has been determined to have a diagnosis/diagnoses of Severe protein-calorie malnutrition and Underweight (BMI < 19).    This patient is being managed with:   Diet NPO with Tube Feed-  Tube Feeding Modality: Nasogastric  Glucerna 1.2 Vidal (GLUCERNARTH)  Total Volume for 24 Hours (mL): 1320  Continuous  Starting Tube Feed Rate {mL per Hour}: 55  Increase Tube Feed Rate by (mL): 0     Every 6 hours  Until Goal Tube Feed Rate (mL per Hour): 55  Tube Feed Duration (in Hours): 24  Tube Feed Start Time: 00:00  Carlos(7 Gm Arginine/7 Gm Glut/1.2 Gm HMB     Qty per Day:  2  Entered: Angel  3 2024 12:08PM

## 2024-01-04 NOTE — PROGRESS NOTE ADULT - NS ATTEND AMEND GEN_ALL_CORE FT
Pt seen and examined with ACP.  Assessment and plan reviewed and discussed.  Agree with above.      I spent 50  minutes face to face w/ this pt of which more than 50% of the time was spent counseling & coordinating care of this pt.

## 2024-01-04 NOTE — PROGRESS NOTE ADULT - SUBJECTIVE AND OBJECTIVE BOX
PROGRESS NOTE:     Patient is a 68y old  Female who presents with a chief complaint of No PO intake, lethargy (03 Jan 2024 07:46)      ---------------------------------------------------  Shelley Lantigua  PGY-2, Internal Medicine  Available on [employer] Teams  Pager: [phone] ([facility]), or [phone] ([facility])  ---------------------------------------------------    INTERVAL / OVERNIGHT EVENTS:  No acute events overnight.     SUBJECTIVE:  Patient examined at bedside with no acute complaints.     BRIEF DAILY PLAN:  - MRI today, follow up results  - Further GOC re PEG pending results of MRI      MEDICATIONS  (STANDING):  albuterol/ipratropium for Nebulization 3 milliLiter(s) Nebulizer every 6 hours  ascorbic acid 500 milliGRAM(s) Oral daily  collagenase Ointment 1 Application(s) Topical daily  dextrose 5% + lactated ringers. 1000 milliLiter(s) (50 mL/Hr) IV Continuous <Continuous>  folic acid 1 milliGRAM(s) Oral daily  levothyroxine Injectable 56 MICROGram(s) IV Push at bedtime  multivitamin 1 Tablet(s) Oral daily  pantoprazole  Injectable 40 milliGRAM(s) IV Push two times a day  polyethylene glycol 3350 17 Gram(s) Oral daily  potassium phosphate / sodium phosphate Powder (PHOS-NaK) 1 Packet(s) Oral three times a day  senna 2 Tablet(s) Oral at bedtime  sertraline 25 milliGRAM(s) Oral daily  sodium chloride 3%  Inhalation 4 milliLiter(s) Inhalation every 12 hours  thiamine 100 milliGRAM(s) Oral daily    MEDICATIONS  (PRN):      CAPILLARY BLOOD GLUCOSE      POCT Blood Glucose.: 139 mg/dL (04 Jan 2024 06:03)  POCT Blood Glucose.: 128 mg/dL (04 Jan 2024 00:08)  POCT Blood Glucose.: 114 mg/dL (03 Jan 2024 18:14)  POCT Blood Glucose.: 112 mg/dL (03 Jan 2024 12:01)    I&O's Summary    03 Jan 2024 07:01  -  04 Jan 2024 07:00  --------------------------------------------------------  IN: 0 mL / OUT: 550 mL / NET: -550 mL        VITALS:   T(C): 36.6 (01-04-24 @ 05:30), Max: 36.6 (01-04-24 @ 05:30)  HR: 90 (01-04-24 @ 05:30) (69 - 90)  BP: 100/64 (01-04-24 @ 05:30) (100/64 - 123/71)  RR: 18 (01-04-24 @ 05:30) (18 - 18)  SpO2: 98% (01-04-24 @ 05:30) (94% - 98%)    CONSTITUTIONAL: NAD; well-developed  HEENT: PERRL, clear conjunctiva  RESPIRATORY: Normal respiratory effort; lungs are clear to auscultation bilaterally; No Crackles/Rhonchi/Wheezing  CARDIOVASCULAR: Regular rate and rhythm, normal S1 and S2, no murmur/rub/gallop; No lower extremity edema; Peripheral pulses are 2+ bilaterally  ABDOMEN: Nontender to palpation, normoactive bowel sounds, no rebound/guarding; No hepatosplenomegaly  MUSCULOSKELETAL: no clubbing or cyanosis of digits; no joint swelling or tenderness to palpation  EXTREMITY: Lower extremities Non-tender to palpation; non-erythematous B/L  NEURO: A&Ox0; no focal deficits       LABS:                        9.1    5.17  )-----------( 231      ( 03 Jan 2024 07:12 )             29.6     01-03    140  |  107  |  <4<L>  ----------------------------<  189<H>  4.0   |  24  |  0.34<L>    Ca    7.8<L>      03 Jan 2024 07:11  Phos  2.4     01-03  Mg     2.4     01-03    TPro  5.3<L>  /  Alb  2.3<L>  /  TBili  0.5  /  DBili  x   /  AST  19  /  ALT  17  /  AlkPhos  55  01-03          Urinalysis Basic - ( 03 Jan 2024 07:11 )    Color: x / Appearance: x / SG: x / pH: x  Gluc: 189 mg/dL / Ketone: x  / Bili: x / Urobili: x   Blood: x / Protein: x / Nitrite: x   Leuk Esterase: x / RBC: x / WBC x   Sq Epi: x / Non Sq Epi: x / Bacteria: x         PROGRESS NOTE:     Patient is a 68y old  Female who presents with a chief complaint of No PO intake, lethargy (03 Jan 2024 07:46)      ---------------------------------------------------  Shelley Lantigua  PGY-2, Internal Medicine  Available on [employer] Teams  Pager: [phone] ([facility]), or [phone] ([facility])  ---------------------------------------------------    INTERVAL / OVERNIGHT EVENTS:  No acute events overnight.     SUBJECTIVE:  Patient examined at bedside with no acute complaints.     BRIEF DAILY PLAN:  - MRI today, follow up results  - Further GOC re PEG pending results of MRI      MEDICATIONS  (STANDING):  albuterol/ipratropium for Nebulization 3 milliLiter(s) Nebulizer every 6 hours  ascorbic acid 500 milliGRAM(s) Oral daily  collagenase Ointment 1 Application(s) Topical daily  dextrose 5% + lactated ringers. 1000 milliLiter(s) (50 mL/Hr) IV Continuous <Continuous>  folic acid 1 milliGRAM(s) Oral daily  levothyroxine Injectable 56 MICROGram(s) IV Push at bedtime  multivitamin 1 Tablet(s) Oral daily  pantoprazole  Injectable 40 milliGRAM(s) IV Push two times a day  polyethylene glycol 3350 17 Gram(s) Oral daily  potassium phosphate / sodium phosphate Powder (PHOS-NaK) 1 Packet(s) Oral three times a day  senna 2 Tablet(s) Oral at bedtime  sertraline 25 milliGRAM(s) Oral daily  sodium chloride 3%  Inhalation 4 milliLiter(s) Inhalation every 12 hours  thiamine 100 milliGRAM(s) Oral daily    MEDICATIONS  (PRN):      CAPILLARY BLOOD GLUCOSE      POCT Blood Glucose.: 139 mg/dL (04 Jan 2024 06:03)  POCT Blood Glucose.: 128 mg/dL (04 Jan 2024 00:08)  POCT Blood Glucose.: 114 mg/dL (03 Jan 2024 18:14)  POCT Blood Glucose.: 112 mg/dL (03 Jan 2024 12:01)    I&O's Summary    03 Jan 2024 07:01  -  04 Jan 2024 07:00  --------------------------------------------------------  IN: 0 mL / OUT: 550 mL / NET: -550 mL        VITALS:   T(C): 36.6 (01-04-24 @ 05:30), Max: 36.6 (01-04-24 @ 05:30)  HR: 90 (01-04-24 @ 05:30) (69 - 90)  BP: 100/64 (01-04-24 @ 05:30) (100/64 - 123/71)  RR: 18 (01-04-24 @ 05:30) (18 - 18)  SpO2: 98% (01-04-24 @ 05:30) (94% - 98%)    CONSTITUTIONAL: NAD; well-developed  HEENT: PERRL, clear conjunctiva  RESPIRATORY: Normal respiratory effort; lungs are clear to auscultation bilaterally; No Crackles/Rhonchi/Wheezing  CARDIOVASCULAR: Regular rate and rhythm, normal S1 and S2, no murmur/rub/gallop; No lower extremity edema; Peripheral pulses are 2+ bilaterally  ABDOMEN: Nontender to palpation, normoactive bowel sounds, no rebound/guarding; No hepatosplenomegaly  MUSCULOSKELETAL: no clubbing or cyanosis of digits; no joint swelling or tenderness to palpation  EXTREMITY: Lower extremities Non-tender to palpation; non-erythematous B/L  NEURO: A&Ox0; no focal deficits       LABS:                        9.1    5.17  )-----------( 231      ( 03 Jan 2024 07:12 )             29.6     01-03    140  |  107  |  <4<L>  ----------------------------<  189<H>  4.0   |  24  |  0.34<L>    Ca    7.8<L>      03 Jan 2024 07:11  Phos  2.4     01-03  Mg     2.4     01-03    TPro  5.3<L>  /  Alb  2.3<L>  /  TBili  0.5  /  DBili  x   /  AST  19  /  ALT  17  /  AlkPhos  55  01-03          Urinalysis Basic - ( 03 Jan 2024 07:11 )    Color: x / Appearance: x / SG: x / pH: x  Gluc: 189 mg/dL / Ketone: x  / Bili: x / Urobili: x   Blood: x / Protein: x / Nitrite: x   Leuk Esterase: x / RBC: x / WBC x   Sq Epi: x / Non Sq Epi: x / Bacteria: x

## 2024-01-04 NOTE — PROGRESS NOTE ADULT - PROBLEM SELECTOR PLAN 2
Hx of recurrent UTI (last admission 9/2023). Recent urine culture showing 100k Ecoli. S/p IV ceftriaxone (12/21-23). Febrile overnight 12/27, repeat U/A positive. Restarted on ceftriaxone (12/28-29) but escalated in setting of ongoing aspiration pneumonia.     - s/p Zosyn (12/29-1/2)

## 2024-01-04 NOTE — PROGRESS NOTE ADULT - ASSESSMENT
68F with PMH significant for breast cancer s/p L mastectomy, TBI (s/p a fall ~2 years ago) c/b aphasia, meningioma, hypothyroidism (hx Grave's dz s/p radioactive iodine), CVA, and dysphagia who presents to [facility] ED on 12/20/2023 with no PO intake and increased lethargy in the last 2-3 days, admitted for hypernatremia 166 and UTI. Hospital course complicated by AHRF likely 2/2 to aspiration pneumonitis.    68F with PMH significant for breast cancer s/p L mastectomy, TBI (s/p a fall ~2 years ago) c/b aphasia, meningioma, hypothyroidism (hx Grave's dz s/p radioactive iodine), CVA, and dysphagia who presents to [facility] ED on 12/20/2023 with no PO intake and increased lethargy in the last 2-3 days, admitted for hypernatremia 166 and UTI. Hospital course complicated by AHRF likely 2/2 to aspiration pneumonitis.

## 2024-01-04 NOTE — PROGRESS NOTE ADULT - PROBLEM SELECTOR PLAN 5
Symptoms of lethargy/encephalopathy >48h with no PO intake, likely has chronic hypernatremia (vs acute) 2/2 severe dehydration. Na 166 on admission, improved w/ D5.     - Resolved   - monitor BMP daily

## 2024-01-04 NOTE — PROGRESS NOTE ADULT - PROBLEM SELECTOR PLAN 4
Hx of dysphagia since 2-3 months ago per son. Notable for severe b/l ext contracture and cachexia. Low PO intake at baseline, no PO intake in the last 2-3 days. NGT placed 12/22, trickle feeds.   S&S: Puree/moderately thick liquids via 1/2 tsp amount only; however pt unlikely to get adequate calories. Repeat assessment recommend NPO.   NG tube removed during RRT overnight, will hold off on replacing NG tube given concern patient likely aspirated.     - NPO for now , high risk for aspiration  - NGT replaced on 01/03  - GI consulted for PEG, ongoing GOC on final decision from family

## 2024-01-04 NOTE — PROGRESS NOTE ADULT - PROBLEM SELECTOR PLAN 1
RRT on 12/28 for hypoxia, currently on HFNC. Suspect likely aspiration pneumonitis. CXR negative for focal consolidations.    - wean o2 as tolerated, currently on RA  - continue zosyn for 5 days  (12/29 -1/2 )  - f/u blood cultures

## 2024-01-04 NOTE — PROGRESS NOTE ADULT - ASSESSMENT
68F with PMH significant for breast cancer s/p L mastectomy, TBI (s/p a fall ~2 years ago) c/b aphasia, meningioma, hypothyroidism (hx Grave's dz s/p radioactive iodine), CVA, and dysphagia who presents to [facility] ED on 12/20/2023 with no PO intake and increased lethargy in the last 2-3 days. Found to have significant hypernatremia to 166 (checking q6h), positive UA with borderline leukocytosis with PMN predominance. Pending culture results. RVP and CTH negative. Afebrile, on RA, hypotensive to 90/60 s/p 1L IVF bolus. Physical exam notable for encephalopathy AOx0 and non-verbal, cachexia and b/l upper/lower ext contracture, and multiple pressure ulcers of varying stages (I-III).      Wound Consult requested to assist w/ management of  Sacral/ Spin/  Buttocks /Bilateral hip Unstageable pressure injury  Lt Shoulder DTI resolving  Incontinence Associated Dermatitis  Lt foot DTI    Buttocks & Sacrum - Medihoney dressing QD  Rt Hip-  medihoney on open wound QD     Betadine to dried intact blister  Lt Hip medihoney dressing QD  Spine - Betadine QD  Buttocks CAVILON Advance TIW and pericare BID and prn soiling        Continue w/ attends under pads and Pericare as per protocol  Lt shoulder- Allevyn QD  Appreciate Palliative & GOC  Abx per Medicine  Ongoing GOC, appreciate Palliative Consult  Moisturize intact skin w/ SWEEN cream BID  Nutrition optimization Severe protein-calorie malnutrition       high quality protein TF, mor/ prosource, MVI & Vit C to promote wound healing       Appreciate RD & S&S input  Continue turning and positioning w/ offloading assistive devices as per protocol  Waffle Cushion to chair when oob to chair  Continue w/ low air loss pressure redistribution bed surface   Pt will need Group 2 mattress on hospital bed and ROHO cushion for wheel chair upon discharge home  Care as per medicine, will follow w/ you  Upon discharge f/u as outpatient at Wound [facility] [phone]  D/w team & RN & s/w attng  Thank you for this consult  Codi Thompson PA-C CWS [id number]  Nights/ Weekends/ Holidays please call:  General Surgery Consult pager [phone]) for emergencies  Wound PT for multilayer leg wrapping or VAC issues ([phone])   I spent 50minutes face to face w/ this pt of which more than 50% of the time was spent counseling & coordinating care of this pt.    68F with PMH significant for breast cancer s/p L mastectomy, TBI (s/p a fall ~2 years ago) c/b aphasia, meningioma, hypothyroidism (hx Grave's dz s/p radioactive iodine), CVA, and dysphagia who presents to [facility] ED on 12/20/2023 with no PO intake and increased lethargy in the last 2-3 days. Found to have significant hypernatremia to 166 (checking q6h), positive UA with borderline leukocytosis with PMN predominance. Pending culture results. RVP and CTH negative. Afebrile, on RA, hypotensive to 90/60 s/p 1L IVF bolus. Physical exam notable for encephalopathy AOx0 and non-verbal, cachexia and b/l upper/lower ext contracture, and multiple pressure ulcers of varying stages (I-III).      Wound Consult requested to assist w/ management of  Sacral/ Spin/  Buttocks /Bilateral hip Unstageable pressure injury  Lt Shoulder DTI resolving  Incontinence Associated Dermatitis  Lt foot DTI    Buttocks & Sacrum - Medihoney dressing QD  Rt Hip-  medihoney on open wound QD     Betadine to dried intact blister  Lt Hip medihoney dressing QD  Spine - Betadine QD  Buttocks CAVILON Advance TIW and pericare BID and prn soiling        Continue w/ attends under pads and Pericare as per protocol  Lt shoulder- Allevyn QD  Appreciate Palliative & GOC  Abx per Medicine  Ongoing GOC, appreciate Palliative Consult  Moisturize intact skin w/ SWEEN cream BID  Nutrition optimization Severe protein-calorie malnutrition       high quality protein TF, mor/ prosource, MVI & Vit C to promote wound healing       Appreciate RD & S&S input  Continue turning and positioning w/ offloading assistive devices as per protocol  Waffle Cushion to chair when oob to chair  Continue w/ low air loss pressure redistribution bed surface   Pt will need Group 2 mattress on hospital bed and ROHO cushion for wheel chair upon discharge home  Care as per medicine, will follow w/ you  Upon discharge f/u as outpatient at Wound Center [address] [facility] [phone]  D/w team & RN & s/w attng  Thank you for this consult  Codi Thompson PA-C CWS [id number]  Nights/ Weekends/ Holidays please call:  General Surgery Consult pager [phone]) for emergencies  Wound PT for multilayer leg wrapping or VAC issues (x [phone])   I spent 50minutes face to face w/ this pt of which more than 50% of the time was spent counseling & coordinating care of this pt.    68F with PMH significant for breast cancer s/p L mastectomy, TBI (s/p a fall ~2 years ago) c/b aphasia, meningioma, hypothyroidism (hx Grave's dz s/p radioactive iodine), CVA, and dysphagia who presents to [facility] ED on 12/20/2023 with no PO intake and increased lethargy in the last 2-3 days. Found to have significant hypernatremia to 166 (checking q6h), positive UA with borderline leukocytosis with PMN predominance. Pending culture results. RVP and CTH negative. Afebrile, on RA, hypotensive to 90/60 s/p 1L IVF bolus. Physical exam notable for encephalopathy AOx0 and non-verbal, cachexia and b/l upper/lower ext contracture, and multiple pressure ulcers of varying stages (I-III).      Wound Consult requested to assist w/ management of  Sacral/ Buttocks /rt  hip stage 4 pressure injury  left hip/spine  unstageable pressure injury  Lt Shoulder DTI resolving  Incontinence Associated Dermatitis  Lt foot DTI (referred to podiatry)    Buttocks & Sacrum - Medihoney dressing QD  Rt Hip-  medihoney on open wound QD     Betadine to dried intact blister  Lt Hip medihoney dressing QD  Spine - Betadine QD  Buttocks CAVILON Advance TIW and pericare BID and prn soiling        Continue w/ attends under pads and Pericare as per protocol  Lt shoulder- Allevyn QD  Left foot DTI--Betadine while awaiting podiatry  Appreciate Palliative & GOC  Abx per Medicine  Ongoing GOC, appreciate Palliative Consult  Moisturize intact skin w/ SWEEN cream BID  Nutrition optimization Severe protein-calorie malnutrition       high quality protein TF, mor/ prosource, MVI & Vit C to promote wound healing       Appreciate RD & S&S input  Continue turning and positioning w/ offloading assistive devices as per protocol  Waffle Cushion to chair when oob to chair  Continue w/ low air loss pressure redistribution bed surface   Pt will need Group 2 mattress on hospital bed and ROHO cushion for wheel chair upon discharge home  Care as per medicine, will follow w/ you  Upon discharge f/u as outpatient at Wound Center [address] [place] [address] [phone]  D/w team & RN & s/w attng  Thank you for this consult  Codi Thompson PA-C CWS [id number]  Nights/ Weekends/ Holidays please call:  General Surgery Consult pager [phone]) for emergencies  Wound PT for multilayer leg wrapping or VAC issues ([phone])      68F with PMH significant for breast cancer s/p L mastectomy, TBI (s/p a fall ~2 years ago) c/b aphasia, meningioma, hypothyroidism (hx Grave's dz s/p radioactive iodine), CVA, and dysphagia who presents to [facility] ED on 12/20/2023 with no PO intake and increased lethargy in the last 2-3 days. Found to have significant hypernatremia to 166 (checking q6h), positive UA with borderline leukocytosis with PMN predominance. Pending culture results. RVP and CTH negative. Afebrile, on RA, hypotensive to 90/60 s/p 1L IVF bolus. Physical exam notable for encephalopathy AOx0 and non-verbal, cachexia and b/l upper/lower ext contracture, and multiple pressure ulcers of varying stages (I-III).      Wound Consult requested to assist w/ management of  Sacral/ Buttocks /rt  hip stage 4 pressure injury  left hip/spine  unstageable pressure injury  Lt Shoulder DTI resolving  Incontinence Associated Dermatitis  Lt foot DTI (referred to podiatry)    Buttocks & Sacrum - Medihoney dressing QD  Rt Hip-  medihoney on open wound QD     Betadine to dried intact blister  Lt Hip medihoney dressing QD  Spine - Betadine QD  Buttocks CAVILON Advance TIW and pericare BID and prn soiling        Continue w/ attends under pads and Pericare as per protocol  Lt shoulder- Allevyn QD  Left foot DTI--Betadine while awaiting podiatry  Appreciate Palliative & GOC  Abx per Medicine  Ongoing GOC, appreciate Palliative Consult  Moisturize intact skin w/ SWEEN cream BID  Nutrition optimization Severe protein-calorie malnutrition       high quality protein TF, mor/ prosource, MVI & Vit C to promote wound healing       Appreciate RD & S&S input  Continue turning and positioning w/ offloading assistive devices as per protocol  Waffle Cushion to chair when oob to chair  Continue w/ low air loss pressure redistribution bed surface   Pt will need Group 2 mattress on hospital bed and ROHO cushion for wheel chair upon discharge home  Care as per medicine, will follow w/ you  Upon discharge f/u as outpatient at Wound Center [address] [place] [address] [phone]  D/w team & RN & s/w attng  Thank you for this consult  Codi Thompson PA-C CWS [id number]  Nights/ Weekends/ Holidays please call:  General Surgery Consult pager [phone]) for emergencies  Wound PT for multilayer leg wrapping or VAC issues ([phone])

## 2024-01-04 NOTE — PROGRESS NOTE ADULT - ATTENDING COMMENTS
Acute hypoxic respiratory failure secondary to likely aspiration- resolved- complete 5 days Zosyn  Dysphagia of unclear etiology with severe malnutrition- Feeding resumed via NGT   Hypernatremia- resumed free water boluses via NGT  Functional quadriplegia with multiple pressure ulcers- wound care following   Hypothyroidism- Continue levothyroxine IV 56 mcg QD- transition back to enteral once route secured   Palliative care consulted- spouse requested further evaluation and treatement for reversible causes of current conditions and requested MRI brain for further assessment prior to proceeding with PEG placement (if patient able to tolerate test)- ordered  Prognosis guarded- remains full code

## 2024-01-04 NOTE — PROGRESS NOTE ADULT - SUBJECTIVE AND OBJECTIVE BOX
[facility]-- WOUND TEAM -- FOLLOW UP NOTE  --------------------------------------------------------------------------------    24 hour events/subjective:    febrile  tolerating TF  incontinent  no excess drainage or odor noted      Diet:  Diet, NPO with Tube Feed:   Tube Feeding Modality: Nasogastric  Glucerna 1.2 Vidal ([employer])  Total Volume for 24 Hours (mL): 1320  Continuous  Starting Tube Feed Rate mL per Hour: 55  Increase Tube Feed Rate by (mL): 0     Every 6 hours  Until Goal Tube Feed Rate (mL per Hour): 55  Tube Feed Duration (in Hours): 24  Tube Feed Start Time: 00:00  Carlos(7 Gm Arginine/7 Gm Glut/1.2 Gm HMB     Qty per Day:  2 (01-03-24 @ 12:08)      ROS: pt unable to offer    ALLERGIES & MEDICATIONS  --------------------------------------------------------------------------------  Allergies  Tapazole (Hives)      STANDING INPATIENT MEDICATIONS  ascorbic acid 500 milliGRAM(s) Oral daily  collagenase Ointment 1 Application(s) Topical daily  folic acid 1 milliGRAM(s) Oral daily  levothyroxine Injectable 56 MICROGram(s) IV Push at bedtime  multivitamin 1 Tablet(s) Oral daily  pantoprazole   Suspension 40 milliGRAM(s) Oral daily  polyethylene glycol 3350 17 Gram(s) Oral daily  senna 2 Tablet(s) Oral at bedtime  sertraline 25 milliGRAM(s) Oral daily  thiamine 100 milliGRAM(s) Oral daily        VITALS/PHYSICAL EXAM  --------------------------------------------------------------------------------  T(C): 36.6 (01-04-24 @ 11:35), Max: 36.6 (01-04-24 @ 05:30)  HR: 101 (01-04-24 @ 11:35) (72 - 104)  BP: 99/62 (01-04-24 @ 11:35) (99/62 - 123/71)  RR: 18 (01-04-24 @ 11:35) (18 - 18)  SpO2: 94% (01-04-24 @ 11:35) (93% - 98%)  Wt(kg): --      NAD, lethargic, cachectic, frail  WD/ WN/  WG  Versa Care P500  bed  HEENT:  NC/AT, sclera clear, mucosa moist, throat clear, trachea midline, neck supple  Respiratory: nonlabored w/ equal chest rise  Gastrointestinal: soft NT/ND   Neurology:  nonverbal, no follow commands, paraplegic  Psych: calm/ appropriate  Musculoskeletal: stiff pROM, w/ contractures  Vascular: BLE equally warn,  no cyanosis, clubbing, nor acute ischemia         BLE edema equal         BLE DP pulses palpable  Skin:  thin, dry, pale, frail,  ecchymosis w/o hematoma  lower buttocks w/ hyperpigmentation of incontinence   left trochanter (HIP) unstageable pressure injury  slough  0.5 cm x 1.5 cm  x 0.1cm  left posterior shoulder hypo/ hyperpigmented skin    resolving pressure injury   Right (hip)  unstageable pressure injury  with 2 wounds closely abutting -   more anterior wound w/  a grey soft lifting eschar and      posteriorly an area of deep maroon dried blister    12 cm x 10  cm   Procedure Note  Using aseptic technique anterior wound limited excisional debridement using a scissor and forceps through necrotic dermis into subcutaneous tissue.  Pt tolerated procedure well.  Hemostasis was maintained throughout.  Debulking debridement of necrotic tissue.  Post measurements 12cm x 10cm x 1cm   Right upper buttock unstageable pressure  injury   5 cm  x 5 cm    lifting soft grey eschar   Procedure Note  Using aseptic technique a limited excisional debridement of upper buttock wound using a scissor and forceps through necrotic/ nonviable dermis, subcutaneous tissue and gluteal muscle.  Pt tolerated procedure well.  Hemostasis was maintained throughout.  Debulking debridement of necrotic tissue.  Post measurements 5cm  x 5cm x 0.5cm  sacrum unstageable pressure injury    w/ lifting soft grey/black eschar w/ hyperpigmented periwound skin    8cm x 6cm   Procedure Note  Using aseptic technique sacral  wound was excisionally debrided using a scissor and forceps through necrotic dermis into subcutaneous tissue.  Pt tolerated procedure well.  Hemostasis was maintained throughout. Debulking debridement of necrotic tissue. Post measurements  8cm x 6cm x 0.5cm  thoracic spine unstageable pressure injury    dried black eschar     12cm x 3cm  no new blistering  or drainage  No odor, erythema, increased warmth, tenderness, induration, fluctuance, nor crepitus        LABS/ CULTURES/ RADIOLOGY:              10.2   6.82  >-----------<  323      [01-04-24 @ 07:15]              31.2     143  |  111  |  11  ----------------------------<  129      [01-04-24 @ 07:15]  4.2   |  23  |  0.34        Ca     8.0     [01-04-24 @ 07:15]      Mg     2.3     [01-04-24 @ 07:15]      Phos  1.8     [01-04-24 @ 07:15]    TPro  6.1  /  Alb  2.7  /  TBili  0.4  /  DBili  x   /  AST  16  /  ALT  21  /  AlkPhos  86  [01-04-24 @ 07:15]    A1C with Estimated Average Glucose Result: 5.7 % (09-19-23 @ 10:33)      CAPILLARY BLOOD GLUCOSE  POCT Blood Glucose.: 166 mg/dL (04 Jan 2024 12:57)  POCT Blood Glucose.: 139 mg/dL (04 Jan 2024 06:03)  POCT Blood Glucose.: 128 mg/dL (04 Jan 2024 00:08)  POCT Blood Glucose.: 114 mg/dL (03 Jan 2024 18:14)    Culture - Blood (collected 12-29-23 @ 02:45)  Source: .Blood Blood-Peripheral  Final Report (01-03-24 @ 10:01):    No growth at 5 days    Culture - Blood (collected 12-29-23 @ 02:45)  Source: .Blood Blood-Peripheral  Final Report (01-03-24 @ 10:01):    No growth at 5 days       [facility]-- WOUND TEAM -- FOLLOW UP NOTE  --------------------------------------------------------------------------------    24 hour events/subjective:    febrile  tolerating TF  incontinent  no excess drainage or odor noted      Diet:  Diet, NPO with Tube Feed:   Tube Feeding Modality: Nasogastric  Glucerna 1.2 Vidal ([employer])  Total Volume for 24 Hours (mL): 1320  Continuous  Starting Tube Feed Rate mL per Hour: 55  Increase Tube Feed Rate by (mL): 0     Every 6 hours  Until Goal Tube Feed Rate (mL per Hour): 55  Tube Feed Duration (in Hours): 24  Tube Feed Start Time: 00:00  Carlos(7 Gm Arginine/7 Gm Glut/1.2 Gm HMB     Qty per Day:  2 (01-03-24 @ 12:08)      ROS: pt unable to offer    ALLERGIES & MEDICATIONS  --------------------------------------------------------------------------------  Allergies  Tapazole (Hives)      STANDING INPATIENT MEDICATIONS  ascorbic acid 500 milliGRAM(s) Oral daily  collagenase Ointment 1 Application(s) Topical daily  folic acid 1 milliGRAM(s) Oral daily  levothyroxine Injectable 56 MICROGram(s) IV Push at bedtime  multivitamin 1 Tablet(s) Oral daily  pantoprazole   Suspension 40 milliGRAM(s) Oral daily  polyethylene glycol 3350 17 Gram(s) Oral daily  senna 2 Tablet(s) Oral at bedtime  sertraline 25 milliGRAM(s) Oral daily  thiamine 100 milliGRAM(s) Oral daily        VITALS/PHYSICAL EXAM  --------------------------------------------------------------------------------  T(C): 36.6 (01-04-24 @ 11:35), Max: 36.6 (01-04-24 @ 05:30)  HR: 101 (01-04-24 @ 11:35) (72 - 104)  BP: 99/62 (01-04-24 @ 11:35) (99/62 - 123/71)  RR: 18 (01-04-24 @ 11:35) (18 - 18)  SpO2: 94% (01-04-24 @ 11:35) (93% - 98%)  Wt(kg): --      NAD, lethargic, cachectic, frail  WD/ WN/  WG  Versa Care P500  bed  HEENT:  NC/AT, sclera clear, mucosa moist, throat clear, trachea midline, neck supple  Respiratory: nonlabored w/ equal chest rise  Gastrointestinal: soft NT/ND   Neurology:  nonverbal, no follow commands, paraplegic  Psych: calm/ appropriate  Musculoskeletal: stiff pROM, w/ contractures  Vascular: BLE equally warn,  no cyanosis, clubbing, nor acute ischemia         BLE edema equal         BLE DP pulses palpable  Skin:  thin, dry, pale, frail,  ecchymosis w/o hematoma  lower buttocks w/ hyperpigmentation of incontinence   left trochanter (HIP) unstageable pressure injury  slough  0.5 cm x 1.5 cm  x 0.1cm  left posterior shoulder hypo/ hyperpigmented skin    resolving pressure injury   Right (hip)  unstageable pressure injury  with 2 wounds closely abutting -   more anterior wound w/  a grey soft lifting eschar and      posteriorly an area of deep maroon dried blister    12 cm x 10  cm   Procedure Note  Using aseptic technique anterior wound limited excisional debridement using a scissor and forceps through necrotic dermis into subcutaneous tissue.  Pt tolerated procedure well.  Hemostasis was maintained throughout.  Debulking debridement of necrotic tissue.  Post measurements 12cm x 10cm x 1cm   Right upper buttock unstageable pressure  injury   5 cm  x 5 cm    lifting soft grey eschar   Procedure Note  Using aseptic technique a limited excisional debridement of upper buttock wound using a scissor and forceps through necrotic/ nonviable dermis, subcutaneous tissue and gluteal muscle.  Pt tolerated procedure well.  Hemostasis was maintained throughout.  Debulking debridement of necrotic tissue.  Post measurements 5cm  x 5cm x 0.5cm  sacrum unstageable pressure injury    w/ lifting soft grey/black eschar w/ hyperpigmented periwound skin    8cm x 6cm   Procedure Note  Using aseptic technique sacral  wound was excisionally debrided using a scissor and forceps through necrotic dermis into subcutaneous tissue.  Pt tolerated procedure well.  Hemostasis was maintained throughout. Debulking debridement of necrotic tissue. Post measurements  8cm x 6cm x 0.5cm  thoracic spine unstageable pressure injury    dried black eschar     12cm x 3cm  no new blistering  or drainage  No odor, erythema, increased warmth, tenderness, induration, fluctuance, nor crepitus        LABS/ CULTURES/ RADIOLOGY:              10.2   6.82  >-----------<  323      [01-04-24 @ 07:15]              31.2     143  |  111  |  11  ----------------------------<  129      [01-04-24 @ 07:15]  4.2   |  23  |  0.34        Ca     8.0     [01-04-24 @ 07:15]      Mg     2.3     [01-04-24 @ 07:15]      Phos  1.8     [01-04-24 @ 07:15]    TPro  6.1  /  Alb  2.7  /  TBili  0.4  /  DBili  x   /  AST  16  /  ALT  21  /  AlkPhos  86  [01-04-24 @ 07:15]    A1C with Estimated Average Glucose Result: 5.7 % (09-19-23 @ 10:33)      CAPILLARY BLOOD GLUCOSE  POCT Blood Glucose.: 166 mg/dL (04 Jan 2024 12:57)  POCT Blood Glucose.: 139 mg/dL (04 Jan 2024 06:03)  POCT Blood Glucose.: 128 mg/dL (04 Jan 2024 00:08)  POCT Blood Glucose.: 114 mg/dL (03 Jan 2024 18:14)    Culture - Blood (collected 12-29-23 @ 02:45)  Source: .Blood Blood-Peripheral  Final Report (01-03-24 @ 10:01):    No growth at 5 days    Culture - Blood (collected 12-29-23 @ 02:45)  Source: .Blood Blood-Peripheral  Final Report (01-03-24 @ 10:01):    No growth at 5 days       [facility]-- WOUND TEAM -- FOLLOW UP NOTE  --------------------------------------------------------------------------------    24 hour events/subjective:    febrile  tolerating TF  incontinent  no excess drainage or odor noted      Diet:  Diet, NPO with Tube Feed:   Tube Feeding Modality: Nasogastric  Glucerna 1.2 Vidal ([employer])  Total Volume for 24 Hours (mL): 1320  Continuous  Starting Tube Feed Rate mL per Hour: 55  Increase Tube Feed Rate by (mL): 0     Every 6 hours  Until Goal Tube Feed Rate (mL per Hour): 55  Tube Feed Duration (in Hours): 24  Tube Feed Start Time: 00:00  Carlos(7 Gm Arginine/7 Gm Glut/1.2 Gm HMB     Qty per Day:  2 (01-03-24 @ 12:08)      ROS: pt unable to offer    ALLERGIES & MEDICATIONS  --------------------------------------------------------------------------------  Allergies  Tapazole (Hives)      STANDING INPATIENT MEDICATIONS  ascorbic acid 500 milliGRAM(s) Oral daily  collagenase Ointment 1 Application(s) Topical daily  folic acid 1 milliGRAM(s) Oral daily  levothyroxine Injectable 56 MICROGram(s) IV Push at bedtime  multivitamin 1 Tablet(s) Oral daily  pantoprazole   Suspension 40 milliGRAM(s) Oral daily  polyethylene glycol 3350 17 Gram(s) Oral daily  senna 2 Tablet(s) Oral at bedtime  sertraline 25 milliGRAM(s) Oral daily  thiamine 100 milliGRAM(s) Oral daily        VITALS/PHYSICAL EXAM  --------------------------------------------------------------------------------  T(C): 36.6 (01-04-24 @ 11:35), Max: 36.6 (01-04-24 @ 05:30)  HR: 101 (01-04-24 @ 11:35) (72 - 104)  BP: 99/62 (01-04-24 @ 11:35) (99/62 - 123/71)  RR: 18 (01-04-24 @ 11:35) (18 - 18)  SpO2: 94% (01-04-24 @ 11:35) (93% - 98%)  Wt(kg): --      NAD, lethargic, cachectic, frail  WD/ WN/  WG  Versa Care P500  bed  HEENT:  NC/AT, sclera clear, mucosa moist, throat clear, trachea midline, neck supple  Respiratory: nonlabored w/ equal chest rise  Gastrointestinal: soft NT/ND   Neurology:  nonverbal, no follow commands, paraplegic  Psych: calm/ appropriate  Musculoskeletal: stiff pROM, w/ contractures  Vascular: BLE equally warn,  no cyanosis, clubbing, nor acute ischemia         BLE edema equal         BLE DP pulses palpable  Skin:  thin, dry, pale, frail,  ecchymosis w/o hematoma  lower buttocks w/ hyperpigmentation of incontinence   left foot DTI (referred to podiatry)  left trochanter (HIP) unstageable pressure injury  slough  0.5 cm x 1.5 cm  x 0.1cm  left posterior shoulder hypo/ hyperpigmented skin    resolving pressure injury   Right (hip)  unstageable pressure injury  with 2 wounds closely abutting -   more anterior wound w/  a grey soft lifting eschar and      posteriorly an area of deep maroon dried blister    12 cm x 10  cm   Procedure Note  Using aseptic technique anterior hip wound underwent selective excisional debridement using a scissor and forceps through necrotic/ non viable dermis into subcutaneous tissue and hip muscle.  Pt tolerated procedure well.  Hemostasis was maintained throughout.  Debulking debridement of necrotic tissue.  Post measurements 12cm x 10cm x 1cm .  Wound now classified as Stage 4  Right upper buttock unstageable pressure  injury   5 cm  x 5 cm    lifting soft grey eschar   Procedure Note  Using aseptic technique a selective excisional debridement of upper buttock wound was performed using a scissor and forceps through necrotic/ nonviable dermis, subcutaneous tissue and gluteal muscle.  Pt tolerated procedure well.  Hemostasis was maintained throughout.  Debulking debridement of necrotic tissue.  Post measurements 5cm  x 5cm x 0.5cm.  wound now classified as Stage 4  sacrum unstageable pressure injury    w/ lifting soft grey/black eschar w/ hyperpigmented periwound skin    8cm x 6cm   Procedure Note  Using aseptic technique sacral  wound underwent selective excisional debridement using a scissor and forceps through necrotic dermis into subcutaneous tissue and gluteal muscle.  Pt tolerated procedure well.  Hemostasis was maintained throughout. Debulking debridement of necrotic tissue. Post measurements  8cm x 6cm x 0.5cm.  Wound  now classified as Stage 4  thoracic spine unstageable pressure injury    dried black eschar     12cm x 3cm  no new blistering  or drainage  No odor, erythema, increased warmth, tenderness, induration, fluctuance, nor crepitus        LABS/ CULTURES/ RADIOLOGY:              10.2   6.82  >-----------<  323      [01-04-24 @ 07:15]              31.2     143  |  111  |  11  ----------------------------<  129      [01-04-24 @ 07:15]  4.2   |  23  |  0.34        Ca     8.0     [01-04-24 @ 07:15]      Mg     2.3     [01-04-24 @ 07:15]      Phos  1.8     [01-04-24 @ 07:15]    TPro  6.1  /  Alb  2.7  /  TBili  0.4  /  DBili  x   /  AST  16  /  ALT  21  /  AlkPhos  86  [01-04-24 @ 07:15]    A1C with Estimated Average Glucose Result: 5.7 % (09-19-23 @ 10:33)      CAPILLARY BLOOD GLUCOSE  POCT Blood Glucose.: 166 mg/dL (04 Jan 2024 12:57)  POCT Blood Glucose.: 139 mg/dL (04 Jan 2024 06:03)  POCT Blood Glucose.: 128 mg/dL (04 Jan 2024 00:08)  POCT Blood Glucose.: 114 mg/dL (03 Jan 2024 18:14)    Culture - Blood (collected 12-29-23 @ 02:45)  Source: .Blood Blood-Peripheral  Final Report (01-03-24 @ 10:01):    No growth at 5 days    Culture - Blood (collected 12-29-23 @ 02:45)  Source: .Blood Blood-Peripheral  Final Report (01-03-24 @ 10:01):    No growth at 5 days       [facility]-- WOUND TEAM -- FOLLOW UP NOTE  --------------------------------------------------------------------------------    24 hour events/subjective:    febrile  tolerating TF  incontinent  no excess drainage or odor noted      Diet:  Diet, NPO with Tube Feed:   Tube Feeding Modality: Nasogastric  Glucerna 1.2 Vidal ([employer]UCER[employer])  Total Volume for 24 Hours (mL): 1320  Continuous  Starting Tube Feed Rate mL per Hour: 55  Increase Tube Feed Rate by (mL): 0     Every 6 hours  Until Goal Tube Feed Rate (mL per Hour): 55  Tube Feed Duration (in Hours): 24  Tube Feed Start Time: 00:00  Carlos(7 Gm Arginine/7 Gm Glut/1.2 Gm HMB     Qty per Day:  2 (01-03-24 @ 12:08)      ROS: pt unable to offer    ALLERGIES & MEDICATIONS  --------------------------------------------------------------------------------  Allergies  Tapazole (Hives)      STANDING INPATIENT MEDICATIONS  ascorbic acid 500 milliGRAM(s) Oral daily  collagenase Ointment 1 Application(s) Topical daily  folic acid 1 milliGRAM(s) Oral daily  levothyroxine Injectable 56 MICROGram(s) IV Push at bedtime  multivitamin 1 Tablet(s) Oral daily  pantoprazole   Suspension 40 milliGRAM(s) Oral daily  polyethylene glycol 3350 17 Gram(s) Oral daily  senna 2 Tablet(s) Oral at bedtime  sertraline 25 milliGRAM(s) Oral daily  thiamine 100 milliGRAM(s) Oral daily        VITALS/PHYSICAL EXAM  --------------------------------------------------------------------------------  T(C): 36.6 (01-04-24 @ 11:35), Max: 36.6 (01-04-24 @ 05:30)  HR: 101 (01-04-24 @ 11:35) (72 - 104)  BP: 99/62 (01-04-24 @ 11:35) (99/62 - 123/71)  RR: 18 (01-04-24 @ 11:35) (18 - 18)  SpO2: 94% (01-04-24 @ 11:35) (93% - 98%)  Wt(kg): --      NAD, lethargic, cachectic, frail  WD/ WN/  WG  Versa Care P500  bed  HEENT:  NC/AT, sclera clear, mucosa moist, throat clear, trachea midline, neck supple  Respiratory: nonlabored w/ equal chest rise  Gastrointestinal: soft NT/ND   Neurology:  nonverbal, no follow commands, paraplegic  Psych: calm/ appropriate  Musculoskeletal: stiff pROM, w/ contractures  Vascular: BLE equally warn,  no cyanosis, clubbing, nor acute ischemia         BLE edema equal         BLE DP pulses palpable  Skin:  thin, dry, pale, frail,  ecchymosis w/o hematoma  lower buttocks w/ hyperpigmentation of incontinence   left foot DTI (referred to podiatry)  left trochanter (HIP) unstageable pressure injury  slough  0.5 cm x 1.5 cm  x 0.1cm  left posterior shoulder hypo/ hyperpigmented skin    resolving pressure injury   Right (hip)  unstageable pressure injury  with 2 wounds closely abutting -   more anterior wound w/  a grey soft lifting eschar and      posteriorly an area of deep maroon dried blister    12 cm x 10  cm   Procedure Note  Using aseptic technique anterior hip wound underwent selective excisional debridement using a scissor and forceps through necrotic/ non viable dermis into subcutaneous tissue and hip muscle.  Pt tolerated procedure well.  Hemostasis was maintained throughout.  Debulking debridement of necrotic tissue.  Post measurements 12cm x 10cm x 1cm .  Wound now classified as Stage 4  Right upper buttock unstageable pressure  injury   5 cm  x 5 cm    lifting soft grey eschar   Procedure Note  Using aseptic technique a selective excisional debridement of upper buttock wound was performed using a scissor and forceps through necrotic/ nonviable dermis, subcutaneous tissue and gluteal muscle.  Pt tolerated procedure well.  Hemostasis was maintained throughout.  Debulking debridement of necrotic tissue.  Post measurements 5cm  x 5cm x 0.5cm.  wound now classified as Stage 4  sacrum unstageable pressure injury    w/ lifting soft grey/black eschar w/ hyperpigmented periwound skin    8cm x 6cm   Procedure Note  Using aseptic technique sacral  wound underwent selective excisional debridement using a scissor and forceps through necrotic dermis into subcutaneous tissue and gluteal muscle.  Pt tolerated procedure well.  Hemostasis was maintained throughout. Debulking debridement of necrotic tissue. Post measurements  8cm x 6cm x 0.5cm.  Wound  now classified as Stage 4  thoracic spine unstageable pressure injury    dried black eschar     12cm x 3cm  no new blistering  or drainage  No odor, erythema, increased warmth, tenderness, induration, fluctuance, nor crepitus        LABS/ CULTURES/ RADIOLOGY:              10.2   6.82  >-----------<  323      [01-04-24 @ 07:15]              31.2     143  |  111  |  11  ----------------------------<  129      [01-04-24 @ 07:15]  4.2   |  23  |  0.34        Ca     8.0     [01-04-24 @ 07:15]      Mg     2.3     [01-04-24 @ 07:15]      Phos  1.8     [01-04-24 @ 07:15]    TPro  6.1  /  Alb  2.7  /  TBili  0.4  /  DBili  x   /  AST  16  /  ALT  21  /  AlkPhos  86  [01-04-24 @ 07:15]    A1C with Estimated Average Glucose Result: 5.7 % (09-19-23 @ 10:33)      CAPILLARY BLOOD GLUCOSE  POCT Blood Glucose.: 166 mg/dL (04 Jan 2024 12:57)  POCT Blood Glucose.: 139 mg/dL (04 Jan 2024 06:03)  POCT Blood Glucose.: 128 mg/dL (04 Jan 2024 00:08)  POCT Blood Glucose.: 114 mg/dL (03 Jan 2024 18:14)    Culture - Blood (collected 12-29-23 @ 02:45)  Source: .Blood Blood-Peripheral  Final Report (01-03-24 @ 10:01):    No growth at 5 days    Culture - Blood (collected 12-29-23 @ 02:45)  Source: .Blood Blood-Peripheral  Final Report (01-03-24 @ 10:01):    No growth at 5 days

## 2024-01-05 ENCOUNTER — APPOINTMENT (OUTPATIENT)
Dept: WOUND CARE | Facility: CLINIC | Age: 69
End: 2024-01-05

## 2024-01-05 LAB
ALBUMIN SERPL ELPH-MCNC: 2.6 G/DL — LOW (ref 3.3–5)
ALBUMIN SERPL ELPH-MCNC: 2.6 G/DL — LOW (ref 3.3–5)
ALP SERPL-CCNC: 72 U/L — SIGNIFICANT CHANGE UP (ref 40–120)
ALP SERPL-CCNC: 72 U/L — SIGNIFICANT CHANGE UP (ref 40–120)
ALT FLD-CCNC: 14 U/L — SIGNIFICANT CHANGE UP (ref 10–45)
ALT FLD-CCNC: 14 U/L — SIGNIFICANT CHANGE UP (ref 10–45)
ANION GAP SERPL CALC-SCNC: 10 MMOL/L — SIGNIFICANT CHANGE UP (ref 5–17)
ANION GAP SERPL CALC-SCNC: 10 MMOL/L — SIGNIFICANT CHANGE UP (ref 5–17)
AST SERPL-CCNC: 11 U/L — SIGNIFICANT CHANGE UP (ref 10–40)
AST SERPL-CCNC: 11 U/L — SIGNIFICANT CHANGE UP (ref 10–40)
BASOPHILS # BLD AUTO: 0.02 K/UL — SIGNIFICANT CHANGE UP (ref 0–0.2)
BASOPHILS # BLD AUTO: 0.02 K/UL — SIGNIFICANT CHANGE UP (ref 0–0.2)
BASOPHILS NFR BLD AUTO: 0.3 % — SIGNIFICANT CHANGE UP (ref 0–2)
BASOPHILS NFR BLD AUTO: 0.3 % — SIGNIFICANT CHANGE UP (ref 0–2)
BILIRUB SERPL-MCNC: 0.3 MG/DL — SIGNIFICANT CHANGE UP (ref 0.2–1.2)
BILIRUB SERPL-MCNC: 0.3 MG/DL — SIGNIFICANT CHANGE UP (ref 0.2–1.2)
BUN SERPL-MCNC: 11 MG/DL — SIGNIFICANT CHANGE UP (ref 7–23)
BUN SERPL-MCNC: 11 MG/DL — SIGNIFICANT CHANGE UP (ref 7–23)
CALCIUM SERPL-MCNC: 7.8 MG/DL — LOW (ref 8.4–10.5)
CALCIUM SERPL-MCNC: 7.8 MG/DL — LOW (ref 8.4–10.5)
CHLORIDE SERPL-SCNC: 103 MMOL/L — SIGNIFICANT CHANGE UP (ref 96–108)
CHLORIDE SERPL-SCNC: 103 MMOL/L — SIGNIFICANT CHANGE UP (ref 96–108)
CO2 SERPL-SCNC: 26 MMOL/L — SIGNIFICANT CHANGE UP (ref 22–31)
CO2 SERPL-SCNC: 26 MMOL/L — SIGNIFICANT CHANGE UP (ref 22–31)
CREAT SERPL-MCNC: 0.35 MG/DL — LOW (ref 0.5–1.3)
CREAT SERPL-MCNC: 0.35 MG/DL — LOW (ref 0.5–1.3)
EGFR: 110 ML/MIN/1.73M2 — SIGNIFICANT CHANGE UP
EGFR: 110 ML/MIN/1.73M2 — SIGNIFICANT CHANGE UP
EOSINOPHIL # BLD AUTO: 0.11 K/UL — SIGNIFICANT CHANGE UP (ref 0–0.5)
EOSINOPHIL # BLD AUTO: 0.11 K/UL — SIGNIFICANT CHANGE UP (ref 0–0.5)
EOSINOPHIL NFR BLD AUTO: 1.6 % — SIGNIFICANT CHANGE UP (ref 0–6)
EOSINOPHIL NFR BLD AUTO: 1.6 % — SIGNIFICANT CHANGE UP (ref 0–6)
GLUCOSE BLDC GLUCOMTR-MCNC: 120 MG/DL — HIGH (ref 70–99)
GLUCOSE BLDC GLUCOMTR-MCNC: 120 MG/DL — HIGH (ref 70–99)
GLUCOSE BLDC GLUCOMTR-MCNC: 122 MG/DL — HIGH (ref 70–99)
GLUCOSE BLDC GLUCOMTR-MCNC: 122 MG/DL — HIGH (ref 70–99)
GLUCOSE BLDC GLUCOMTR-MCNC: 149 MG/DL — HIGH (ref 70–99)
GLUCOSE BLDC GLUCOMTR-MCNC: 149 MG/DL — HIGH (ref 70–99)
GLUCOSE SERPL-MCNC: 135 MG/DL — HIGH (ref 70–99)
GLUCOSE SERPL-MCNC: 135 MG/DL — HIGH (ref 70–99)
HCT VFR BLD CALC: 29.2 % — LOW (ref 34.5–45)
HCT VFR BLD CALC: 29.2 % — LOW (ref 34.5–45)
HGB BLD-MCNC: 9.3 G/DL — LOW (ref 11.5–15.5)
HGB BLD-MCNC: 9.3 G/DL — LOW (ref 11.5–15.5)
IMM GRANULOCYTES NFR BLD AUTO: 0.6 % — SIGNIFICANT CHANGE UP (ref 0–0.9)
IMM GRANULOCYTES NFR BLD AUTO: 0.6 % — SIGNIFICANT CHANGE UP (ref 0–0.9)
LYMPHOCYTES # BLD AUTO: 0.87 K/UL — LOW (ref 1–3.3)
LYMPHOCYTES # BLD AUTO: 0.87 K/UL — LOW (ref 1–3.3)
LYMPHOCYTES # BLD AUTO: 12.8 % — LOW (ref 13–44)
LYMPHOCYTES # BLD AUTO: 12.8 % — LOW (ref 13–44)
MAGNESIUM SERPL-MCNC: 2.2 MG/DL — SIGNIFICANT CHANGE UP (ref 1.6–2.6)
MAGNESIUM SERPL-MCNC: 2.2 MG/DL — SIGNIFICANT CHANGE UP (ref 1.6–2.6)
MCHC RBC-ENTMCNC: 26.8 PG — LOW (ref 27–34)
MCHC RBC-ENTMCNC: 26.8 PG — LOW (ref 27–34)
MCHC RBC-ENTMCNC: 31.8 GM/DL — LOW (ref 32–36)
MCHC RBC-ENTMCNC: 31.8 GM/DL — LOW (ref 32–36)
MCV RBC AUTO: 84.1 FL — SIGNIFICANT CHANGE UP (ref 80–100)
MCV RBC AUTO: 84.1 FL — SIGNIFICANT CHANGE UP (ref 80–100)
MONOCYTES # BLD AUTO: 0.33 K/UL — SIGNIFICANT CHANGE UP (ref 0–0.9)
MONOCYTES # BLD AUTO: 0.33 K/UL — SIGNIFICANT CHANGE UP (ref 0–0.9)
MONOCYTES NFR BLD AUTO: 4.9 % — SIGNIFICANT CHANGE UP (ref 2–14)
MONOCYTES NFR BLD AUTO: 4.9 % — SIGNIFICANT CHANGE UP (ref 2–14)
NEUTROPHILS # BLD AUTO: 5.43 K/UL — SIGNIFICANT CHANGE UP (ref 1.8–7.4)
NEUTROPHILS # BLD AUTO: 5.43 K/UL — SIGNIFICANT CHANGE UP (ref 1.8–7.4)
NEUTROPHILS NFR BLD AUTO: 79.8 % — HIGH (ref 43–77)
NEUTROPHILS NFR BLD AUTO: 79.8 % — HIGH (ref 43–77)
NRBC # BLD: 0 /100 WBCS — SIGNIFICANT CHANGE UP (ref 0–0)
NRBC # BLD: 0 /100 WBCS — SIGNIFICANT CHANGE UP (ref 0–0)
PHOSPHATE SERPL-MCNC: 2.1 MG/DL — LOW (ref 2.5–4.5)
PHOSPHATE SERPL-MCNC: 2.1 MG/DL — LOW (ref 2.5–4.5)
PLATELET # BLD AUTO: 345 K/UL — SIGNIFICANT CHANGE UP (ref 150–400)
PLATELET # BLD AUTO: 345 K/UL — SIGNIFICANT CHANGE UP (ref 150–400)
POTASSIUM SERPL-MCNC: 3.7 MMOL/L — SIGNIFICANT CHANGE UP (ref 3.5–5.3)
POTASSIUM SERPL-MCNC: 3.7 MMOL/L — SIGNIFICANT CHANGE UP (ref 3.5–5.3)
POTASSIUM SERPL-SCNC: 3.7 MMOL/L — SIGNIFICANT CHANGE UP (ref 3.5–5.3)
POTASSIUM SERPL-SCNC: 3.7 MMOL/L — SIGNIFICANT CHANGE UP (ref 3.5–5.3)
PROT SERPL-MCNC: 5.6 G/DL — LOW (ref 6–8.3)
PROT SERPL-MCNC: 5.6 G/DL — LOW (ref 6–8.3)
RBC # BLD: 3.47 M/UL — LOW (ref 3.8–5.2)
RBC # BLD: 3.47 M/UL — LOW (ref 3.8–5.2)
RBC # FLD: 16.2 % — HIGH (ref 10.3–14.5)
RBC # FLD: 16.2 % — HIGH (ref 10.3–14.5)
SODIUM SERPL-SCNC: 139 MMOL/L — SIGNIFICANT CHANGE UP (ref 135–145)
SODIUM SERPL-SCNC: 139 MMOL/L — SIGNIFICANT CHANGE UP (ref 135–145)
WBC # BLD: 6.8 K/UL — SIGNIFICANT CHANGE UP (ref 3.8–10.5)
WBC # BLD: 6.8 K/UL — SIGNIFICANT CHANGE UP (ref 3.8–10.5)
WBC # FLD AUTO: 6.8 K/UL — SIGNIFICANT CHANGE UP (ref 3.8–10.5)
WBC # FLD AUTO: 6.8 K/UL — SIGNIFICANT CHANGE UP (ref 3.8–10.5)

## 2024-01-05 PROCEDURE — 99232 SBSQ HOSP IP/OBS MODERATE 35: CPT | Mod: GC

## 2024-01-05 RX ORDER — POTASSIUM PHOSPHATE, MONOBASIC POTASSIUM PHOSPHATE, DIBASIC 236; 224 MG/ML; MG/ML
30 INJECTION, SOLUTION INTRAVENOUS ONCE
Refills: 0 | Status: COMPLETED | OUTPATIENT
Start: 2024-01-05 | End: 2024-01-05

## 2024-01-05 RX ADMIN — SERTRALINE 25 MILLIGRAM(S): 25 TABLET, FILM COATED ORAL at 12:42

## 2024-01-05 RX ADMIN — Medication 1 TABLET(S): at 12:42

## 2024-01-05 RX ADMIN — Medication 1 MILLIGRAM(S): at 12:42

## 2024-01-05 RX ADMIN — Medication 1 APPLICATION(S): at 11:59

## 2024-01-05 RX ADMIN — Medication 500 MILLIGRAM(S): at 12:42

## 2024-01-05 RX ADMIN — PANTOPRAZOLE SODIUM 40 MILLIGRAM(S): 20 TABLET, DELAYED RELEASE ORAL at 12:42

## 2024-01-05 RX ADMIN — POTASSIUM PHOSPHATE, MONOBASIC POTASSIUM PHOSPHATE, DIBASIC 83.33 MILLIMOLE(S): 236; 224 INJECTION, SOLUTION INTRAVENOUS at 11:58

## 2024-01-05 RX ADMIN — Medication 100 MILLIGRAM(S): at 12:42

## 2024-01-05 NOTE — PROGRESS NOTE ADULT - PROBLEM SELECTOR PLAN 10
- DVT ppx: Lovenox stopped due to concern for bleed, SCDs in place  - Diet: NPO for now, pending NG tube   - Dispo: pending further GOC discussions - DVT ppx: Lovenox stopped due to concern for bleed, SCDs in place  - Diet: NG tube placement   - Dispo: pending further GOC discussions

## 2024-01-05 NOTE — PROGRESS NOTE ADULT - PROBLEM SELECTOR PLAN 3
-Patient is anemic chronically, with recent Hgb drop  -Repeat CBC showing stable Hgb  -Ordered CT C/A/P: no signs of active bleeding or hematoma   -Hgb stable and improved Hx of recurrent UTI (last admission 9/2023). Recent urine culture showing 100k Ecoli. S/p IV ceftriaxone (12/21-23). Febrile overnight 12/27, repeat U/A positive. Restarted on ceftriaxone (12/28-29) but escalated in setting of ongoing aspiration pneumonia.     - s/p Zosyn (12/29-1/2)

## 2024-01-05 NOTE — PROGRESS NOTE ADULT - PROBLEM SELECTOR PLAN 1
RRT on 12/28 for hypoxia, currently on HFNC. Suspect likely aspiration pneumonitis. CXR negative for focal consolidations.    - wean o2 as tolerated, currently on RA  - continue zosyn for 5 days  (12/29 -1/2 )  - f/u blood cultures Hx of dysphagia since 2-3 months ago per son. Notable for severe b/l ext contracture and cachexia. Low PO intake at baseline, no PO intake in the last 2-3 days. NGT placed 12/22, trickle feeds.   S&S: Puree/moderately thick liquids via 1/2 tsp amount only; however pt unlikely to get adequate calories. Repeat assessment recommend NPO.   NG tube placed 1/3     - continue tube feeds   - GI consulted for PEG, ongoing GOC on final decision from family.  would like to explore any reversible causes Hx of dysphagia since 2-3 months ago per son. Notable for severe b/l ext contracture and cachexia. Low PO intake at baseline, no PO intake in the last 2-3 days. NGT placed 12/22, trickle feeds.   S&S: Puree/moderately thick liquids via 1/2 tsp amount only; however pt unlikely to get adequate calories. Repeat assessment recommend NPO.   NG tube placed 1/3     - continue tube feeds   - GI consulted for PEG, ongoing GOC on final decision from family.  would like to explore any reversible causes prior to PEG placement. however discussed w/  worsening PO intake and dysphagia likely 2/2 to progression of disease, unlikely reversible.

## 2024-01-05 NOTE — PROGRESS NOTE ADULT - ATTENDING COMMENTS
Acute hypoxic respiratory failure secondary to likely aspiration- resolved- complete 5 days Zosyn  Dysphagia of unclear etiology with severe malnutrition- Feeding resumed via NGT   Hypernatremia- resumed free water boluses via NGT  Functional quadriplegia with multiple pressure ulcers- wound care following   Hypothyroidism- Continue levothyroxine IV 56 mcg QD- transition to enteral once route secured   Palliative care consulted- spouse requested further evaluation and treatement for reversible causes of current conditions and requested MRI brain which demonstrated no recent ischemia, intracranial hemorrhages or enhancing mass lesions   GI reconsult for PEG placement-  asking to proceed  Prognosis guarded- remains full code

## 2024-01-05 NOTE — PROGRESS NOTE ADULT - ASSESSMENT
68F with PMH significant for breast cancer s/p L mastectomy, TBI (s/p a fall ~2 years ago) c/b aphasia, meningioma, hypothyroidism (hx Grave's dz s/p radioactive iodine), CVA, and dysphagia who presents to [facility] on 12/20/2023 with no PO intake and increased lethargy in the last 2-3 days, admitted for hypernatremia 166 and UTI. Hospital course complicated by AHRF likely 2/2 to aspiration pneumonitis.    68F with PMH significant for breast cancer s/p L mastectomy, TBI (s/p a fall ~2 years ago) c/b aphasia, meningioma, hypothyroidism (hx Grave's dz s/p radioactive iodine), CVA, and dysphagia who presents to [facility] ED on 12/20/2023 with no PO intake and increased lethargy in the last 2-3 days, admitted for hypernatremia 166 and UTI. Hospital course complicated by AHRF likely 2/2 to aspiration pneumonitis.

## 2024-01-05 NOTE — PROGRESS NOTE ADULT - SUBJECTIVE AND OBJECTIVE BOX
*******************************  Usha Reyes MD (PGY-1)  Internal Medicine  Contact via [employer] TEAMS  *******************************    YOUNGER, JONATHAN  68y  Female    Patient is a 68y old  Female who presents with a chief complaint of No PO intake, lethargy (04 Jan 2024 15:04)      Subjective:    Objective:  T(C): 36.8 (01-05-24 @ 04:20), Max: 36.9 (01-05-24 @ 00:24)  HR: 88 (01-05-24 @ 04:20) (83 - 104)  BP: 106/68 (01-05-24 @ 04:20) (99/62 - 116/61)  RR: 18 (01-05-24 @ 04:20) (18 - 18)  SpO2: 95% (01-05-24 @ 04:20) (93% - 98%)  I&O's Summary      PHYSICAL EXAM:  GENERAL: NAD, well-groomed, well-developed  HEAD:  Atraumatic, Normocephalic  EYES: EOMI, PERRLA, conjunctiva and sclera clear  ENMT: No tonsillar erythema, exudates, or enlargement; Moist mucous membranes, Good dentition, No lesions  NECK: Supple, No JVD, Normal thyroid  NERVOUS SYSTEM:  Alert & Oriented X3, Good concentration; Motor Strength 5/5 B/L upper and lower extremities; DTRs 2+ intact and symmetric  CHEST/LUNG: Clear to auscultation bilaterally; No rales, rhonchi, wheezing, or rubs  HEART: Regular rate and rhythm; No murmurs, rubs, or gallops  ABDOMEN: Soft, Nontender, Nondistended; Bowel sounds present  EXTREMITIES:  2+ Peripheral Pulses, No clubbing, cyanosis, or edema  LYMPH: No lymphadenopathy noted  SKIN: No rashes or lesions    LABS:      CAPILLARY BLOOD GLUCOSE      POCT Blood Glucose.: 149 mg/dL (05 Jan 2024 06:05)  POCT Blood Glucose.: 120 mg/dL (05 Jan 2024 00:21)  POCT Blood Glucose.: 115 mg/dL (04 Jan 2024 18:02)  POCT Blood Glucose.: 166 mg/dL (04 Jan 2024 12:57)      RADIOLOGY & ADDITIONAL TESTS:    MEDICATIONS  (STANDING):  ascorbic acid 500 milliGRAM(s) Oral daily  collagenase Ointment 1 Application(s) Topical daily  folic acid 1 milliGRAM(s) Oral daily  levothyroxine Injectable 56 MICROGram(s) IV Push at bedtime  multivitamin 1 Tablet(s) Oral daily  pantoprazole   Suspension 40 milliGRAM(s) Oral daily  polyethylene glycol 3350 17 Gram(s) Oral daily  senna 2 Tablet(s) Oral at bedtime  sertraline 25 milliGRAM(s) Oral daily  thiamine 100 milliGRAM(s) Oral daily    MEDICATIONS  (PRN):             *******************************  Usha Reyes MD (PGY-1)  Internal Medicine  Contact via [employer] TEAMS  *******************************    YOUNGER, JONATHAN  68y  Female    Patient is a 68y old  Female who presents with a chief complaint of No PO intake, lethargy (04 Jan 2024 15:04)    Subjective: No acute events overnight. Patient seen at bedside this morning, A&O x0, opened eyes to voice. Unable to obtain ROS.     Objective:  T(C): 36.8 (01-05-24 @ 04:20), Max: 36.9 (01-05-24 @ 00:24)  HR: 88 (01-05-24 @ 04:20) (83 - 104)  BP: 106/68 (01-05-24 @ 04:20) (99/62 - 116/61)  RR: 18 (01-05-24 @ 04:20) (18 - 18)  SpO2: 95% (01-05-24 @ 04:20) (93% - 98%)  I&O's Summary    PHYSICAL EXAM:  GENERAL: NAD, cachetic, chronically ill appearing   HEAD:  Atraumatic, Normocephalic  EYES: EOMI, PERRLA, conjunctiva and sclera clear  ENMT: dry mucous membranes  NECK: Supple, trachea midline   NERVOUS SYSTEM:  Alert & Oriented X0, opens eyes to voice, can follow very simple commands   CHEST/LUNG: Clear to auscultation bilaterally; No rales, rhonchi, wheezing, or rubs  HEART: Regular rate and rhythm; No murmurs, rubs, or gallops  ABDOMEN: Soft, Nontender, Nondistended; Bowel sounds present  EXTREMITIES:  2+ Peripheral Pulses, No clubbing, cyanosis, or edema, contracted   LYMPH: No lymphadenopathy noted  SKIN: No rashes or lesions    LABS:             9.3    6.80  )-----------( 345      ( 05 Jan 2024 07:07 )             29.2     01-05    139  |  103  |  11  ----------------------------<  135<H>  3.7   |  26  |  0.35<L>    Ca    7.8<L>      05 Jan 2024 07:07  Phos  2.1     01-05  Mg     2.2     01-05    TPro  5.6<L>  /  Alb  2.6<L>  /  TBili  0.3  /  DBili  x   /  AST  11  /  ALT  14  /  AlkPhos  72  01-05    Culture Results:   No growth at 5 days (12-29 @ 02:45)  Culture Results:   No growth at 5 days (12-29 @ 02:45)  Culture Results:   No growth (12-28 @ 12:23)  Culture Results:   No growth at 5 days (12-28 @ 01:32)  Culture Results:   >100,000 CFU/ml Escherichia coli (12-20 @ 11:54)  Culture Results:   No growth at 5 days (12-20 @ 11:30)  Culture Results:   No growth at 5 days (12-20 @ 11:15)    CAPILLARY BLOOD GLUCOSE  POCT Blood Glucose.: 149 mg/dL (05 Jan 2024 06:05)  POCT Blood Glucose.: 120 mg/dL (05 Jan 2024 00:21)  POCT Blood Glucose.: 115 mg/dL (04 Jan 2024 18:02)  POCT Blood Glucose.: 166 mg/dL (04 Jan 2024 12:57)    RADIOLOGY & ADDITIONAL TESTS:  < from: MR Head w/wo IV Cont (01.04.24 @ 23:04) >    FINDINGS:    No acute cerebral ischemia by diffusion-weighted images. No intracranial   hemorrhages midline shift or mass effect. Few foci of susceptibility in   bifrontal regions likely reflect hemosiderin deposition from remote   hemorrhage. No intracranial enhancing mass lesions on postcontrast images   are identified. Brainstem is normal in size and signal. Partly empty   sella.    The ventricular system is normal in size and configuration. The basilar   cisterns are intact. No hydrocephalus.    No collection or abnormal enhancement in extra-axial spaces.    Paranasal sinuses well-aerated. Diffuse effusion in the right and trace   in the left mastoids respectively. Intraocular lens replacement   bilaterally.    IMPRESSION:    No recent ischemia, intracranial hemorrhages or enhancing mass lesions.    < end of copied text >    MEDICATIONS  (STANDING):  ascorbic acid 500 milliGRAM(s) Oral daily  collagenase Ointment 1 Application(s) Topical daily  folic acid 1 milliGRAM(s) Oral daily  levothyroxine Injectable 56 MICROGram(s) IV Push at bedtime  multivitamin 1 Tablet(s) Oral daily  pantoprazole   Suspension 40 milliGRAM(s) Oral daily  polyethylene glycol 3350 17 Gram(s) Oral daily  senna 2 Tablet(s) Oral at bedtime  sertraline 25 milliGRAM(s) Oral daily  thiamine 100 milliGRAM(s) Oral daily    MEDICATIONS  (PRN):

## 2024-01-05 NOTE — PROGRESS NOTE ADULT - PROBLEM SELECTOR PLAN 2
Hx of recurrent UTI (last admission 9/2023). Recent urine culture showing 100k Ecoli. S/p IV ceftriaxone (12/21-23). Febrile overnight 12/27, repeat U/A positive. Restarted on ceftriaxone (12/28-29) but escalated in setting of ongoing aspiration pneumonia.     - s/p Zosyn (12/29-1/2) RRT on 12/28 for hypoxia, currently on HFNC. Suspect likely aspiration pneumonitis. CXR negative for focal consolidations.    - wean o2 as tolerated, currently on RA  - continue zosyn for 5 days  (12/29 -1/2 )  - f/u blood cultures RRT on 12/28 for hypoxia, currently on HFNC. Suspect likely aspiration pneumonitis. CXR negative for focal consolidations.  Resolved, on RA.     - completed zosyn for 5 days  (12/29 -1/2 )  - f/u blood cultures

## 2024-01-05 NOTE — PROGRESS NOTE ADULT - PROBLEM SELECTOR PLAN 4
Hx of dysphagia since 2-3 months ago per son. Notable for severe b/l ext contracture and cachexia. Low PO intake at baseline, no PO intake in the last 2-3 days. NGT placed 12/22, trickle feeds.   S&S: Puree/moderately thick liquids via 1/2 tsp amount only; however pt unlikely to get adequate calories. Repeat assessment recommend NPO.   NG tube removed during RRT overnight, will hold off on replacing NG tube given concern patient likely aspirated.     - NPO for now , high risk for aspiration  - NGT replaced on 01/03  - GI consulted for PEG, ongoing GOC on final decision from family -Patient is anemic chronically, with recent Hgb drop  -Repeat CBC showing stable Hgb  -Ordered CT C/A/P: no signs of active bleeding or hematoma   -Hgb stable and improved

## 2024-01-06 LAB
ALBUMIN SERPL ELPH-MCNC: 2.3 G/DL — LOW (ref 3.3–5)
ALBUMIN SERPL ELPH-MCNC: 2.3 G/DL — LOW (ref 3.3–5)
ALP SERPL-CCNC: 77 U/L — SIGNIFICANT CHANGE UP (ref 40–120)
ALP SERPL-CCNC: 77 U/L — SIGNIFICANT CHANGE UP (ref 40–120)
ALT FLD-CCNC: 12 U/L — SIGNIFICANT CHANGE UP (ref 10–45)
ALT FLD-CCNC: 12 U/L — SIGNIFICANT CHANGE UP (ref 10–45)
ANION GAP SERPL CALC-SCNC: 10 MMOL/L — SIGNIFICANT CHANGE UP (ref 5–17)
ANION GAP SERPL CALC-SCNC: 10 MMOL/L — SIGNIFICANT CHANGE UP (ref 5–17)
AST SERPL-CCNC: 13 U/L — SIGNIFICANT CHANGE UP (ref 10–40)
AST SERPL-CCNC: 13 U/L — SIGNIFICANT CHANGE UP (ref 10–40)
BASOPHILS # BLD AUTO: 0.02 K/UL — SIGNIFICANT CHANGE UP (ref 0–0.2)
BASOPHILS # BLD AUTO: 0.02 K/UL — SIGNIFICANT CHANGE UP (ref 0–0.2)
BASOPHILS NFR BLD AUTO: 0.2 % — SIGNIFICANT CHANGE UP (ref 0–2)
BASOPHILS NFR BLD AUTO: 0.2 % — SIGNIFICANT CHANGE UP (ref 0–2)
BILIRUB SERPL-MCNC: 0.4 MG/DL — SIGNIFICANT CHANGE UP (ref 0.2–1.2)
BILIRUB SERPL-MCNC: 0.4 MG/DL — SIGNIFICANT CHANGE UP (ref 0.2–1.2)
BUN SERPL-MCNC: 8 MG/DL — SIGNIFICANT CHANGE UP (ref 7–23)
BUN SERPL-MCNC: 8 MG/DL — SIGNIFICANT CHANGE UP (ref 7–23)
CALCIUM SERPL-MCNC: 8.1 MG/DL — LOW (ref 8.4–10.5)
CALCIUM SERPL-MCNC: 8.1 MG/DL — LOW (ref 8.4–10.5)
CHLORIDE SERPL-SCNC: 104 MMOL/L — SIGNIFICANT CHANGE UP (ref 96–108)
CHLORIDE SERPL-SCNC: 104 MMOL/L — SIGNIFICANT CHANGE UP (ref 96–108)
CO2 SERPL-SCNC: 24 MMOL/L — SIGNIFICANT CHANGE UP (ref 22–31)
CO2 SERPL-SCNC: 24 MMOL/L — SIGNIFICANT CHANGE UP (ref 22–31)
CREAT SERPL-MCNC: <0.3 MG/DL — LOW (ref 0.5–1.3)
CREAT SERPL-MCNC: <0.3 MG/DL — LOW (ref 0.5–1.3)
EGFR: 114 ML/MIN/1.73M2 — SIGNIFICANT CHANGE UP
EGFR: 114 ML/MIN/1.73M2 — SIGNIFICANT CHANGE UP
EOSINOPHIL # BLD AUTO: 0.1 K/UL — SIGNIFICANT CHANGE UP (ref 0–0.5)
EOSINOPHIL # BLD AUTO: 0.1 K/UL — SIGNIFICANT CHANGE UP (ref 0–0.5)
EOSINOPHIL NFR BLD AUTO: 1.1 % — SIGNIFICANT CHANGE UP (ref 0–6)
EOSINOPHIL NFR BLD AUTO: 1.1 % — SIGNIFICANT CHANGE UP (ref 0–6)
GLUCOSE BLDC GLUCOMTR-MCNC: 116 MG/DL — HIGH (ref 70–99)
GLUCOSE BLDC GLUCOMTR-MCNC: 116 MG/DL — HIGH (ref 70–99)
GLUCOSE BLDC GLUCOMTR-MCNC: 134 MG/DL — HIGH (ref 70–99)
GLUCOSE BLDC GLUCOMTR-MCNC: 134 MG/DL — HIGH (ref 70–99)
GLUCOSE BLDC GLUCOMTR-MCNC: 139 MG/DL — HIGH (ref 70–99)
GLUCOSE BLDC GLUCOMTR-MCNC: 139 MG/DL — HIGH (ref 70–99)
GLUCOSE BLDC GLUCOMTR-MCNC: 144 MG/DL — HIGH (ref 70–99)
GLUCOSE BLDC GLUCOMTR-MCNC: 144 MG/DL — HIGH (ref 70–99)
GLUCOSE SERPL-MCNC: 119 MG/DL — HIGH (ref 70–99)
GLUCOSE SERPL-MCNC: 119 MG/DL — HIGH (ref 70–99)
HCT VFR BLD CALC: 29.9 % — LOW (ref 34.5–45)
HCT VFR BLD CALC: 29.9 % — LOW (ref 34.5–45)
HGB BLD-MCNC: 9.6 G/DL — LOW (ref 11.5–15.5)
HGB BLD-MCNC: 9.6 G/DL — LOW (ref 11.5–15.5)
IMM GRANULOCYTES NFR BLD AUTO: 0.6 % — SIGNIFICANT CHANGE UP (ref 0–0.9)
IMM GRANULOCYTES NFR BLD AUTO: 0.6 % — SIGNIFICANT CHANGE UP (ref 0–0.9)
LYMPHOCYTES # BLD AUTO: 1.03 K/UL — SIGNIFICANT CHANGE UP (ref 1–3.3)
LYMPHOCYTES # BLD AUTO: 1.03 K/UL — SIGNIFICANT CHANGE UP (ref 1–3.3)
LYMPHOCYTES # BLD AUTO: 11.4 % — LOW (ref 13–44)
LYMPHOCYTES # BLD AUTO: 11.4 % — LOW (ref 13–44)
MAGNESIUM SERPL-MCNC: 2.1 MG/DL — SIGNIFICANT CHANGE UP (ref 1.6–2.6)
MAGNESIUM SERPL-MCNC: 2.1 MG/DL — SIGNIFICANT CHANGE UP (ref 1.6–2.6)
MCHC RBC-ENTMCNC: 27.2 PG — SIGNIFICANT CHANGE UP (ref 27–34)
MCHC RBC-ENTMCNC: 27.2 PG — SIGNIFICANT CHANGE UP (ref 27–34)
MCHC RBC-ENTMCNC: 32.1 GM/DL — SIGNIFICANT CHANGE UP (ref 32–36)
MCHC RBC-ENTMCNC: 32.1 GM/DL — SIGNIFICANT CHANGE UP (ref 32–36)
MCV RBC AUTO: 84.7 FL — SIGNIFICANT CHANGE UP (ref 80–100)
MCV RBC AUTO: 84.7 FL — SIGNIFICANT CHANGE UP (ref 80–100)
MONOCYTES # BLD AUTO: 0.36 K/UL — SIGNIFICANT CHANGE UP (ref 0–0.9)
MONOCYTES # BLD AUTO: 0.36 K/UL — SIGNIFICANT CHANGE UP (ref 0–0.9)
MONOCYTES NFR BLD AUTO: 4 % — SIGNIFICANT CHANGE UP (ref 2–14)
MONOCYTES NFR BLD AUTO: 4 % — SIGNIFICANT CHANGE UP (ref 2–14)
NEUTROPHILS # BLD AUTO: 7.51 K/UL — HIGH (ref 1.8–7.4)
NEUTROPHILS # BLD AUTO: 7.51 K/UL — HIGH (ref 1.8–7.4)
NEUTROPHILS NFR BLD AUTO: 82.7 % — HIGH (ref 43–77)
NEUTROPHILS NFR BLD AUTO: 82.7 % — HIGH (ref 43–77)
NRBC # BLD: 0 /100 WBCS — SIGNIFICANT CHANGE UP (ref 0–0)
NRBC # BLD: 0 /100 WBCS — SIGNIFICANT CHANGE UP (ref 0–0)
PHOSPHATE SERPL-MCNC: 2 MG/DL — LOW (ref 2.5–4.5)
PHOSPHATE SERPL-MCNC: 2 MG/DL — LOW (ref 2.5–4.5)
PLATELET # BLD AUTO: 337 K/UL — SIGNIFICANT CHANGE UP (ref 150–400)
PLATELET # BLD AUTO: 337 K/UL — SIGNIFICANT CHANGE UP (ref 150–400)
POTASSIUM SERPL-MCNC: 4 MMOL/L — SIGNIFICANT CHANGE UP (ref 3.5–5.3)
POTASSIUM SERPL-MCNC: 4 MMOL/L — SIGNIFICANT CHANGE UP (ref 3.5–5.3)
POTASSIUM SERPL-SCNC: 4 MMOL/L — SIGNIFICANT CHANGE UP (ref 3.5–5.3)
POTASSIUM SERPL-SCNC: 4 MMOL/L — SIGNIFICANT CHANGE UP (ref 3.5–5.3)
PROT SERPL-MCNC: 5.7 G/DL — LOW (ref 6–8.3)
PROT SERPL-MCNC: 5.7 G/DL — LOW (ref 6–8.3)
RBC # BLD: 3.53 M/UL — LOW (ref 3.8–5.2)
RBC # BLD: 3.53 M/UL — LOW (ref 3.8–5.2)
RBC # FLD: 16 % — HIGH (ref 10.3–14.5)
RBC # FLD: 16 % — HIGH (ref 10.3–14.5)
SODIUM SERPL-SCNC: 138 MMOL/L — SIGNIFICANT CHANGE UP (ref 135–145)
SODIUM SERPL-SCNC: 138 MMOL/L — SIGNIFICANT CHANGE UP (ref 135–145)
WBC # BLD: 9.07 K/UL — SIGNIFICANT CHANGE UP (ref 3.8–10.5)
WBC # BLD: 9.07 K/UL — SIGNIFICANT CHANGE UP (ref 3.8–10.5)
WBC # FLD AUTO: 9.07 K/UL — SIGNIFICANT CHANGE UP (ref 3.8–10.5)
WBC # FLD AUTO: 9.07 K/UL — SIGNIFICANT CHANGE UP (ref 3.8–10.5)

## 2024-01-06 PROCEDURE — 99232 SBSQ HOSP IP/OBS MODERATE 35: CPT

## 2024-01-06 RX ORDER — POTASSIUM PHOSPHATE, MONOBASIC POTASSIUM PHOSPHATE, DIBASIC 236; 224 MG/ML; MG/ML
30 INJECTION, SOLUTION INTRAVENOUS ONCE
Refills: 0 | Status: COMPLETED | OUTPATIENT
Start: 2024-01-06 | End: 2024-01-06

## 2024-01-06 RX ADMIN — Medication 56 MICROGRAM(S): at 21:57

## 2024-01-06 RX ADMIN — PANTOPRAZOLE SODIUM 40 MILLIGRAM(S): 20 TABLET, DELAYED RELEASE ORAL at 11:34

## 2024-01-06 RX ADMIN — Medication 100 MILLIGRAM(S): at 11:34

## 2024-01-06 RX ADMIN — POTASSIUM PHOSPHATE, MONOBASIC POTASSIUM PHOSPHATE, DIBASIC 83.33 MILLIMOLE(S): 236; 224 INJECTION, SOLUTION INTRAVENOUS at 09:28

## 2024-01-06 RX ADMIN — Medication 500 MILLIGRAM(S): at 11:33

## 2024-01-06 RX ADMIN — POLYETHYLENE GLYCOL 3350 17 GRAM(S): 17 POWDER, FOR SOLUTION ORAL at 11:33

## 2024-01-06 RX ADMIN — SERTRALINE 25 MILLIGRAM(S): 25 TABLET, FILM COATED ORAL at 11:34

## 2024-01-06 RX ADMIN — Medication 1 TABLET(S): at 11:34

## 2024-01-06 RX ADMIN — Medication 1 APPLICATION(S): at 11:35

## 2024-01-06 RX ADMIN — Medication 1 MILLIGRAM(S): at 11:34

## 2024-01-06 NOTE — PROGRESS NOTE ADULT - PROBLEM SELECTOR PLAN 10
- DVT ppx: Lovenox stopped due to concern for bleed, SCDs in place  - Diet: NG tube placement   - Dispo: pending further GOC discussions

## 2024-01-06 NOTE — PROGRESS NOTE ADULT - ATTENDING COMMENTS
Acute hypoxic respiratory failure secondary to likely aspiration- resolved- complete 5 days Zosyn  Dysphagia of unclear etiology with severe malnutrition- Feeding resumed via NGT,  indecisitive regarding PEG tube, request repeat S & S eval   Hypernatremia- resumed free water boluses via NGT  Functional quadriplegia with multiple pressure ulcers- wound care following   Hypothyroidism- Continue levothyroxine IV 56 mcg QD- transition to enteral once route secured   Palliative care consulted- spouse requested further evaluation and treatement for reversible causes of current conditions and requested MRI brain which demonstrated no recent ischemia, intracranial hemorrhages or enhancing mass lesions   Prognosis guarded- remains full code.    D/w resident Dr. Reyes

## 2024-01-06 NOTE — PROGRESS NOTE ADULT - NUTRITIONAL ASSESSMENT
This patient has been assessed with a concern for Malnutrition and has been determined to have a diagnosis/diagnoses of Underweight (BMI < 19) and Severe protein-calorie malnutrition.    This patient is being managed with:   Diet NPO with Tube Feed-  Tube Feeding Modality: Nasogastric  Glucerna 1.2 Vidal (GLUCERNARTH)  Total Volume for 24 Hours (mL): 1320  Continuous  Starting Tube Feed Rate {mL per Hour}: 55  Increase Tube Feed Rate by (mL): 0     Every 6 hours  Until Goal Tube Feed Rate (mL per Hour): 55  Tube Feed Duration (in Hours): 24  Tube Feed Start Time: 00:00  Carlos(7 Gm Arginine/7 Gm Glut/1.2 Gm HMB     Qty per Day:  2  Entered: Angel  3 2024 12:08PM

## 2024-01-06 NOTE — PROGRESS NOTE ADULT - ASSESSMENT
68F with PMH significant for breast cancer s/p L mastectomy, TBI (s/p a fall ~2 years ago) c/b aphasia, meningioma, hypothyroidism (hx Grave's dz s/p radioactive iodine), CVA, and dysphagia who presents to [facility] ED on 12/20/2023 with no PO intake and increased lethargy in the last 2-3 days, admitted for hypernatremia 166 and UTI. Hospital course complicated by AHRF likely 2/2 to aspiration pneumonitis.    68F with PMH significant for breast cancer s/p L mastectomy, TBI (s/p a fall ~2 years ago) c/b aphasia, meningioma, hypothyroidism (hx Grave's dz s/p radioactive iodine), CVA, and dysphagia who presents to [facility] ED on 12/20/2023 with no PO intake and increased lethargy in the last 2-3 days, admitted for hypernatremia 166 and UTI. Hospital course complicated by AHRF likely 2/2 to aspiration pneumonitis.

## 2024-01-06 NOTE — PROGRESS NOTE ADULT - PROBLEM SELECTOR PLAN 1
Hx of dysphagia since 2-3 months ago per son. Notable for severe b/l ext contracture and cachexia. Low PO intake at baseline, no PO intake in the last 2-3 days. NGT placed 12/22, trickle feeds.   S&S: Puree/moderately thick liquids via 1/2 tsp amount only; however pt unlikely to get adequate calories. Repeat assessment recommend NPO.   NG tube placed 1/3     - continue tube feeds   - GI consulted for PEG, ongoing GOC on final decision from family.  would like to explore any reversible causes prior to PEG placement. however discussed w/  worsening PO intake and dysphagia likely 2/2 to progression of disease, unlikely reversible. Hx of dysphagia since 2-3 months ago per son. Notable for severe b/l ext contracture and cachexia. Low PO intake at baseline, no PO intake in the last 2-3 days. NGT placed 12/22, trickle feeds.   S&S: Puree/moderately thick liquids via 1/2 tsp amount only; however pt unlikely to get adequate calories. Repeat assessment recommend NPO.   NG tube placed 1/3     - continue tube feeds   - f/u repeat swallow study   - GI consulted for PEG, ongoing GOC on final decision from family.  would like to explore any reversible causes prior to PEG placement. however discussed w/  worsening PO intake and dysphagia likely 2/2 to progression of disease, unlikely reversible.  would like repeat swallow study

## 2024-01-06 NOTE — PROGRESS NOTE ADULT - SUBJECTIVE AND OBJECTIVE BOX
*******************************  Usha Reyes MD (PGY-1)  Internal Medicine  Contact via [employer] TEAMS  *******************************    YOUNGER, JONATHAN  68y  Female    Patient is a 68y old  Female who presents with a chief complaint of No PO intake, lethargy (06 Jan 2024 06:56)      Subjective:    Objective:  T(C): 36.3 (01-06-24 @ 04:29), Max: 37 (01-06-24 @ 00:52)  HR: 76 (01-06-24 @ 04:29) (75 - 86)  BP: 106/61 (01-06-24 @ 04:29) (104/68 - 110/71)  RR: 18 (01-06-24 @ 04:29) (18 - 18)  SpO2: 95% (01-06-24 @ 04:29) (93% - 98%)  I&O's Summary    05 Jan 2024 07:01  -  06 Jan 2024 07:00  --------------------------------------------------------  IN: 660 mL / OUT: 0 mL / NET: 660 mL        PHYSICAL EXAM:  GENERAL: NAD, well-groomed, well-developed  HEAD:  Atraumatic, Normocephalic  EYES: EOMI, PERRLA, conjunctiva and sclera clear  ENMT: No tonsillar erythema, exudates, or enlargement; Moist mucous membranes, Good dentition, No lesions  NECK: Supple, No JVD, Normal thyroid  NERVOUS SYSTEM:  Alert & Oriented X3, Good concentration; Motor Strength 5/5 B/L upper and lower extremities; DTRs 2+ intact and symmetric  CHEST/LUNG: Clear to auscultation bilaterally; No rales, rhonchi, wheezing, or rubs  HEART: Regular rate and rhythm; No murmurs, rubs, or gallops  ABDOMEN: Soft, Nontender, Nondistended; Bowel sounds present  EXTREMITIES:  2+ Peripheral Pulses, No clubbing, cyanosis, or edema  LYMPH: No lymphadenopathy noted  SKIN: No rashes or lesions    LABS:      CAPILLARY BLOOD GLUCOSE      POCT Blood Glucose.: 116 mg/dL (06 Jan 2024 06:11)  POCT Blood Glucose.: 144 mg/dL (06 Jan 2024 00:49)  POCT Blood Glucose.: 122 mg/dL (05 Jan 2024 18:10)      RADIOLOGY & ADDITIONAL TESTS:    MEDICATIONS  (STANDING):  ascorbic acid 500 milliGRAM(s) Oral daily  collagenase Ointment 1 Application(s) Topical daily  folic acid 1 milliGRAM(s) Oral daily  levothyroxine Injectable 56 MICROGram(s) IV Push at bedtime  multivitamin 1 Tablet(s) Oral daily  pantoprazole   Suspension 40 milliGRAM(s) Oral daily  polyethylene glycol 3350 17 Gram(s) Oral daily  senna 2 Tablet(s) Oral at bedtime  sertraline 25 milliGRAM(s) Oral daily  thiamine 100 milliGRAM(s) Oral daily    MEDICATIONS  (PRN):             *******************************  Usha Reyes MD (PGY-1)  Internal Medicine  Contact via [employer] TEAMS  *******************************    YOUNGER, JONATHAN  68y  Female    Patient is a 68y old  Female who presents with a chief complaint of No PO intake, lethargy (06 Jan 2024 06:56)    Subjective: No acute events overnight. Patient unable to participate in history taking.     Objective:  T(C): 36.3 (01-06-24 @ 04:29), Max: 37 (01-06-24 @ 00:52)  HR: 76 (01-06-24 @ 04:29) (75 - 86)  BP: 106/61 (01-06-24 @ 04:29) (104/68 - 110/71)  RR: 18 (01-06-24 @ 04:29) (18 - 18)  SpO2: 95% (01-06-24 @ 04:29) (93% - 98%)  I&O's Summary    05 Jan 2024 07:01  -  06 Jan 2024 07:00  --------------------------------------------------------  IN: 660 mL / OUT: 0 mL / NET: 660 mL    PHYSICAL EXAM:  GENERAL: NAD, cachetic, chronically ill appearing   HEAD:  Atraumatic, Normocephalic  EYES: EOMI, PERRLA, conjunctiva and sclera clear  ENMT: NG tube in place, dry mucous membranes, poor dentition   NECK: Supple, No JVD, trachea midline  NERVOUS SYSTEM:  Alert & Oriented X0, opens eyes to voice   CHEST/LUNG: Clear to auscultation bilaterally; No rales, rhonchi, wheezing, or rubs  HEART: Regular rate and rhythm; No murmurs, rubs, or gallops  ABDOMEN: Soft, Nontender, Nondistended; Bowel sounds present  EXTREMITIES:  2+ Peripheral Pulses, contracted, No clubbing, cyanosis, or edema  LYMPH: No lymphadenopathy noted  SKIN: No rashes or lesions    LABS:             9.6    9.07  )-----------( 337      ( 06 Jan 2024 06:55 )             29.9     01-06    138  |  104  |  8   ----------------------------<  119<H>  4.0   |  24  |  <0.30<L>    Ca    8.1<L>      06 Jan 2024 06:58  Phos  2.0     01-06  Mg     2.1     01-06    TPro  5.7<L>  /  Alb  2.3<L>  /  TBili  0.4  /  DBili  x   /  AST  13  /  ALT  12  /  AlkPhos  77  01-06    Culture Results:   No growth at 5 days (12-29 @ 02:45)  Culture Results:   No growth at 5 days (12-29 @ 02:45)  Culture Results:   No growth (12-28 @ 12:23)  Culture Results:   No growth at 5 days (12-28 @ 01:32)  Culture Results:   >100,000 CFU/ml Escherichia coli (12-20 @ 11:54)  Culture Results:   No growth at 5 days (12-20 @ 11:30)  Culture Results:   No growth at 5 days (12-20 @ 11:15)    CAPILLARY BLOOD GLUCOSE  POCT Blood Glucose.: 116 mg/dL (06 Jan 2024 06:11)  POCT Blood Glucose.: 144 mg/dL (06 Jan 2024 00:49)  POCT Blood Glucose.: 122 mg/dL (05 Jan 2024 18:10)    RADIOLOGY & ADDITIONAL TESTS:    MEDICATIONS  (STANDING):  ascorbic acid 500 milliGRAM(s) Oral daily  collagenase Ointment 1 Application(s) Topical daily  folic acid 1 milliGRAM(s) Oral daily  levothyroxine Injectable 56 MICROGram(s) IV Push at bedtime  multivitamin 1 Tablet(s) Oral daily  pantoprazole   Suspension 40 milliGRAM(s) Oral daily  polyethylene glycol 3350 17 Gram(s) Oral daily  senna 2 Tablet(s) Oral at bedtime  sertraline 25 milliGRAM(s) Oral daily  thiamine 100 milliGRAM(s) Oral daily    MEDICATIONS  (PRN):

## 2024-01-06 NOTE — PROGRESS NOTE ADULT - PROBLEM SELECTOR PLAN 2
RRT on 12/28 for hypoxia, currently on HFNC. Suspect likely aspiration pneumonitis. CXR negative for focal consolidations.  Resolved, on RA.     - completed zosyn for 5 days  (12/29 -1/2 )  - f/u blood cultures

## 2024-01-07 LAB
ALBUMIN SERPL ELPH-MCNC: 2.7 G/DL — LOW (ref 3.3–5)
ALBUMIN SERPL ELPH-MCNC: 2.7 G/DL — LOW (ref 3.3–5)
ALP SERPL-CCNC: 76 U/L — SIGNIFICANT CHANGE UP (ref 40–120)
ALP SERPL-CCNC: 76 U/L — SIGNIFICANT CHANGE UP (ref 40–120)
ALT FLD-CCNC: 15 U/L — SIGNIFICANT CHANGE UP (ref 10–45)
ALT FLD-CCNC: 15 U/L — SIGNIFICANT CHANGE UP (ref 10–45)
ANION GAP SERPL CALC-SCNC: 9 MMOL/L — SIGNIFICANT CHANGE UP (ref 5–17)
ANION GAP SERPL CALC-SCNC: 9 MMOL/L — SIGNIFICANT CHANGE UP (ref 5–17)
AST SERPL-CCNC: 14 U/L — SIGNIFICANT CHANGE UP (ref 10–40)
AST SERPL-CCNC: 14 U/L — SIGNIFICANT CHANGE UP (ref 10–40)
BILIRUB SERPL-MCNC: 0.3 MG/DL — SIGNIFICANT CHANGE UP (ref 0.2–1.2)
BILIRUB SERPL-MCNC: 0.3 MG/DL — SIGNIFICANT CHANGE UP (ref 0.2–1.2)
BUN SERPL-MCNC: 8 MG/DL — SIGNIFICANT CHANGE UP (ref 7–23)
BUN SERPL-MCNC: 8 MG/DL — SIGNIFICANT CHANGE UP (ref 7–23)
CALCIUM SERPL-MCNC: 8 MG/DL — LOW (ref 8.4–10.5)
CALCIUM SERPL-MCNC: 8 MG/DL — LOW (ref 8.4–10.5)
CHLORIDE SERPL-SCNC: 105 MMOL/L — SIGNIFICANT CHANGE UP (ref 96–108)
CHLORIDE SERPL-SCNC: 105 MMOL/L — SIGNIFICANT CHANGE UP (ref 96–108)
CO2 SERPL-SCNC: 24 MMOL/L — SIGNIFICANT CHANGE UP (ref 22–31)
CO2 SERPL-SCNC: 24 MMOL/L — SIGNIFICANT CHANGE UP (ref 22–31)
CREAT SERPL-MCNC: 0.3 MG/DL — LOW (ref 0.5–1.3)
CREAT SERPL-MCNC: 0.3 MG/DL — LOW (ref 0.5–1.3)
EGFR: 114 ML/MIN/1.73M2 — SIGNIFICANT CHANGE UP
EGFR: 114 ML/MIN/1.73M2 — SIGNIFICANT CHANGE UP
GLUCOSE BLDC GLUCOMTR-MCNC: 114 MG/DL — HIGH (ref 70–99)
GLUCOSE BLDC GLUCOMTR-MCNC: 114 MG/DL — HIGH (ref 70–99)
GLUCOSE BLDC GLUCOMTR-MCNC: 116 MG/DL — HIGH (ref 70–99)
GLUCOSE BLDC GLUCOMTR-MCNC: 116 MG/DL — HIGH (ref 70–99)
GLUCOSE BLDC GLUCOMTR-MCNC: 127 MG/DL — HIGH (ref 70–99)
GLUCOSE BLDC GLUCOMTR-MCNC: 127 MG/DL — HIGH (ref 70–99)
GLUCOSE BLDC GLUCOMTR-MCNC: 131 MG/DL — HIGH (ref 70–99)
GLUCOSE BLDC GLUCOMTR-MCNC: 131 MG/DL — HIGH (ref 70–99)
GLUCOSE SERPL-MCNC: 110 MG/DL — HIGH (ref 70–99)
GLUCOSE SERPL-MCNC: 110 MG/DL — HIGH (ref 70–99)
HCT VFR BLD CALC: 28.7 % — LOW (ref 34.5–45)
HCT VFR BLD CALC: 28.7 % — LOW (ref 34.5–45)
HGB BLD-MCNC: 9.1 G/DL — LOW (ref 11.5–15.5)
HGB BLD-MCNC: 9.1 G/DL — LOW (ref 11.5–15.5)
MAGNESIUM SERPL-MCNC: 2.3 MG/DL — SIGNIFICANT CHANGE UP (ref 1.6–2.6)
MAGNESIUM SERPL-MCNC: 2.3 MG/DL — SIGNIFICANT CHANGE UP (ref 1.6–2.6)
MCHC RBC-ENTMCNC: 27.1 PG — SIGNIFICANT CHANGE UP (ref 27–34)
MCHC RBC-ENTMCNC: 27.1 PG — SIGNIFICANT CHANGE UP (ref 27–34)
MCHC RBC-ENTMCNC: 31.7 GM/DL — LOW (ref 32–36)
MCHC RBC-ENTMCNC: 31.7 GM/DL — LOW (ref 32–36)
MCV RBC AUTO: 85.4 FL — SIGNIFICANT CHANGE UP (ref 80–100)
MCV RBC AUTO: 85.4 FL — SIGNIFICANT CHANGE UP (ref 80–100)
NRBC # BLD: 0 /100 WBCS — SIGNIFICANT CHANGE UP (ref 0–0)
NRBC # BLD: 0 /100 WBCS — SIGNIFICANT CHANGE UP (ref 0–0)
PHOSPHATE SERPL-MCNC: 2.7 MG/DL — SIGNIFICANT CHANGE UP (ref 2.5–4.5)
PHOSPHATE SERPL-MCNC: 2.7 MG/DL — SIGNIFICANT CHANGE UP (ref 2.5–4.5)
PLATELET # BLD AUTO: 357 K/UL — SIGNIFICANT CHANGE UP (ref 150–400)
PLATELET # BLD AUTO: 357 K/UL — SIGNIFICANT CHANGE UP (ref 150–400)
POTASSIUM SERPL-MCNC: 4 MMOL/L — SIGNIFICANT CHANGE UP (ref 3.5–5.3)
POTASSIUM SERPL-MCNC: 4 MMOL/L — SIGNIFICANT CHANGE UP (ref 3.5–5.3)
POTASSIUM SERPL-SCNC: 4 MMOL/L — SIGNIFICANT CHANGE UP (ref 3.5–5.3)
POTASSIUM SERPL-SCNC: 4 MMOL/L — SIGNIFICANT CHANGE UP (ref 3.5–5.3)
PROT SERPL-MCNC: 5.6 G/DL — LOW (ref 6–8.3)
PROT SERPL-MCNC: 5.6 G/DL — LOW (ref 6–8.3)
RBC # BLD: 3.36 M/UL — LOW (ref 3.8–5.2)
RBC # BLD: 3.36 M/UL — LOW (ref 3.8–5.2)
RBC # FLD: 16.4 % — HIGH (ref 10.3–14.5)
RBC # FLD: 16.4 % — HIGH (ref 10.3–14.5)
SODIUM SERPL-SCNC: 138 MMOL/L — SIGNIFICANT CHANGE UP (ref 135–145)
SODIUM SERPL-SCNC: 138 MMOL/L — SIGNIFICANT CHANGE UP (ref 135–145)
WBC # BLD: 9.46 K/UL — SIGNIFICANT CHANGE UP (ref 3.8–10.5)
WBC # BLD: 9.46 K/UL — SIGNIFICANT CHANGE UP (ref 3.8–10.5)
WBC # FLD AUTO: 9.46 K/UL — SIGNIFICANT CHANGE UP (ref 3.8–10.5)
WBC # FLD AUTO: 9.46 K/UL — SIGNIFICANT CHANGE UP (ref 3.8–10.5)

## 2024-01-07 PROCEDURE — 99232 SBSQ HOSP IP/OBS MODERATE 35: CPT

## 2024-01-07 PROCEDURE — 71045 X-RAY EXAM CHEST 1 VIEW: CPT | Mod: 26,76

## 2024-01-07 RX ORDER — ENOXAPARIN SODIUM 100 MG/ML
40 INJECTION SUBCUTANEOUS EVERY 24 HOURS
Refills: 0 | Status: DISCONTINUED | OUTPATIENT
Start: 2024-01-07 | End: 2024-01-18

## 2024-01-07 RX ADMIN — ENOXAPARIN SODIUM 40 MILLIGRAM(S): 100 INJECTION SUBCUTANEOUS at 14:44

## 2024-01-07 RX ADMIN — Medication 1 MILLIGRAM(S): at 14:25

## 2024-01-07 RX ADMIN — Medication 1 TABLET(S): at 14:25

## 2024-01-07 RX ADMIN — Medication 56 MICROGRAM(S): at 21:13

## 2024-01-07 RX ADMIN — Medication 500 MILLIGRAM(S): at 14:25

## 2024-01-07 RX ADMIN — Medication 1 APPLICATION(S): at 14:26

## 2024-01-07 RX ADMIN — SERTRALINE 25 MILLIGRAM(S): 25 TABLET, FILM COATED ORAL at 14:25

## 2024-01-07 RX ADMIN — PANTOPRAZOLE SODIUM 40 MILLIGRAM(S): 20 TABLET, DELAYED RELEASE ORAL at 14:25

## 2024-01-07 RX ADMIN — Medication 100 MILLIGRAM(S): at 14:25

## 2024-01-07 NOTE — SWALLOW BEDSIDE ASSESSMENT ADULT - SLP GENERAL OBSERVATIONS
Encountered Pt laying sideways on bed on RA. +NGT. Pt is A&Ox0, nonverbal, and unable to follow simple commands. +b/l upper and lower extremity contractures.
Encountered Pt sitting semi-upright in bed on RA.  at bedside upon encounter, however, left prior to initiation of this evaluation. +NGT. Pt is A&Ox0, intermittently verbal (echolalic), and attempts to follow simple 1-step commands.
Encountered Pt laying sideways on bed on RA. +NGT. Pt is A&Ox0, minimally verbal, and attempts to follow simple commands w/ verbal cues. +b/l upper and lower extremity contractures.

## 2024-01-07 NOTE — SWALLOW BEDSIDE ASSESSMENT ADULT - SWALLOW EVAL: RECOMMENDED FEEDING/EATING TECHNIQUES
ALL INTAKE VIA 1/2 TSP ONLY/allow for swallow between intakes/alternate food with liquid/check mouth frequently for oral residue/pocketing/crush medication (when feasible)/maintain upright posture during/after eating for 30 mins/no straws/oral hygiene/position upright (90 degrees)/small sips/bites
allow for swallow between intakes/check mouth frequently for oral residue/pocketing/crush medication (when feasible)/maintain upright posture during/after eating for 30 mins/no straws/oral hygiene/position upright (90 degrees)/small sips/bites

## 2024-01-07 NOTE — SWALLOW BEDSIDE ASSESSMENT ADULT - COMMENTS
IMAGIN/20 CXR: IMPRESSION: No focal consolidation.   CT head: IMPRESSION: No acute hemorrhage mass or mass effect is seen.    ***Pt is known to this service from prior hospitalization 2023. Recommendations at that time for diet of puree/moderately thick liquids via half tsp. On this admission, Pt seen for initial swallow evaluation . Recommendations at that time for puree/moderately thick liquids via 1/2 tsp w/ NGT for supplemental nutrition and calorie count; Pt later made NPO by team 2/2 oral holding. Maintained on NGT.
IMAGIN/20 CXR: IMPRESSION: No focal consolidation.   CT head: IMPRESSION: No acute hemorrhage mass or mass effect is seen.    ***Pt is known to this service from prior hospitalization 2023. Recommendations at that time for diet of puree/moderately thick liquids via half tsp.
IMAGIN/7 CXR: IMPRESSION: No focal consolidation. NG tube tip in distal stomach.    ***Pt is known to this service from prior hospitalization 2023. Recommendations at that time for diet of puree/moderately thick liquids via half tsp. On this admission, Pt seen for initial swallow evaluation . Recommendations at that time for puree/moderately thick liquids via 1/2 tsp w/ NGT for supplemental nutrition and calorie count; Pt later made NPO by team 2/2 oral holding. Maintained on NGT. Repeat evaluation performed  w/ recommendations for NPO and GOC conversation.

## 2024-01-07 NOTE — SWALLOW BEDSIDE ASSESSMENT ADULT - SWALLOW EVAL: ANTICIPATED DISCHARGE DISPOSITION
TBD pending hospital course
Home; Pt will require SLP services at the next level of care
TBD pending hospital course

## 2024-01-07 NOTE — SWALLOW BEDSIDE ASSESSMENT ADULT - SLP PERTINENT HISTORY OF CURRENT PROBLEM
68y F w/ PMHx of TBI s/p fall (residual aphasia, L-sided weakness, and dysphagia at baseline), CVA, meningioma, breast CA, and hypothyroidism presents w/ AMS and poor PO intake. Found to have significant hypernatremia to 166 (checking q6h), mild hypotension (90/60) positive UA with borderline leukocytosis with PMN predominance. Pending culture results. Physical exam notable for encephalopathy AOx0 and non-verbal, cachexia and b/l upper/lower ext contracture, and multiple pressure ulcers of varying stages (I-III). Per palliative care consult, family wishes to pursue further dysphagia work-up. Low-grade fever noted 12/28. 68y F w/ PMHx of TBI s/p fall (residual aphasia, L-sided weakness, and dysphagia at baseline), CVA, meningioma, breast CA, and hypothyroidism presents w/ AMS and poor PO intake. Found to have significant hypernatremia to 166 (checking q6h), mild hypotension (90/60) positive UA with borderline leukocytosis with PMN predominance. Pending culture results. Physical exam notable for encephalopathy AOx0 and non-verbal, cachexia and b/l upper/lower ext contracture, and multiple pressure ulcers of varying stages (I-III). Per palliative care consult, family wishes to pursue further dysphagia work-up. Hospital course complicated by AHRF likely 2/2 to aspiration pneumonitis. Per GI note 1/3, family wishes to defer PEG placement at this time. NGT placed and S&S to be re-consulted when mentation improves.

## 2024-01-07 NOTE — PROGRESS NOTE ADULT - ATTENDING COMMENTS
Acute hypoxic respiratory failure secondary to likely aspiration- resolved- complete 5 days Zosyn  Dysphagia of unclear etiology with severe malnutrition- Feeding resumed via NGT,  indecisitive regarding PEG tube, request repeat S & S eval , f/u   Hypernatremia- resolved, resumed free water boluses via NGT  Functional quadriplegia with multiple pressure ulcers- wound care following   Hypothyroidism- Continue levothyroxine IV 56 mcg QD- transition to enteral once route secured   Palliative care consulted- spouse requested further evaluation and treatement for reversible causes of current conditions and requested MRI brain which demonstrated no recent ischemia, intracranial hemorrhages or enhancing mass lesions   Prognosis guarded- remains full code.    D/w resident Dr. Reyes .

## 2024-01-07 NOTE — PROGRESS NOTE ADULT - SUBJECTIVE AND OBJECTIVE BOX
*******************************  Usha Reyes MD (PGY-1)  Internal Medicine  Contact via [employer] TEAMS  *******************************    YOUNGER, JONATHAN  68y  Female    Patient is a 68y old  Female who presents with a chief complaint of No PO intake, lethargy (06 Jan 2024 06:56)      Subjective:    Objective:  T(C): 36.4 (01-07-24 @ 05:24), Max: 36.7 (01-06-24 @ 16:37)  HR: 85 (01-07-24 @ 05:24) (79 - 85)  BP: 111/70 (01-07-24 @ 05:24) (100/62 - 119/72)  RR: 18 (01-07-24 @ 05:24) (18 - 18)  SpO2: 97% (01-07-24 @ 05:24) (94% - 98%)  I&O's Summary      PHYSICAL EXAM:  GENERAL: NAD, well-groomed, well-developed  HEAD:  Atraumatic, Normocephalic  EYES: EOMI, PERRLA, conjunctiva and sclera clear  ENMT: No tonsillar erythema, exudates, or enlargement; Moist mucous membranes, Good dentition, No lesions  NECK: Supple, No JVD, Normal thyroid  NERVOUS SYSTEM:  Alert & Oriented X3, Good concentration; Motor Strength 5/5 B/L upper and lower extremities; DTRs 2+ intact and symmetric  CHEST/LUNG: Clear to auscultation bilaterally; No rales, rhonchi, wheezing, or rubs  HEART: Regular rate and rhythm; No murmurs, rubs, or gallops  ABDOMEN: Soft, Nontender, Nondistended; Bowel sounds present  EXTREMITIES:  2+ Peripheral Pulses, No clubbing, cyanosis, or edema  LYMPH: No lymphadenopathy noted  SKIN: No rashes or lesions    LABS:      CAPILLARY BLOOD GLUCOSE      POCT Blood Glucose.: 116 mg/dL (07 Jan 2024 06:48)  POCT Blood Glucose.: 131 mg/dL (07 Jan 2024 00:37)  POCT Blood Glucose.: 139 mg/dL (06 Jan 2024 21:20)  POCT Blood Glucose.: 134 mg/dL (06 Jan 2024 12:07)      RADIOLOGY & ADDITIONAL TESTS:    MEDICATIONS  (STANDING):  ascorbic acid 500 milliGRAM(s) Oral daily  collagenase Ointment 1 Application(s) Topical daily  folic acid 1 milliGRAM(s) Oral daily  levothyroxine Injectable 56 MICROGram(s) IV Push at bedtime  multivitamin 1 Tablet(s) Oral daily  pantoprazole   Suspension 40 milliGRAM(s) Oral daily  polyethylene glycol 3350 17 Gram(s) Oral daily  senna 2 Tablet(s) Oral at bedtime  sertraline 25 milliGRAM(s) Oral daily  thiamine 100 milliGRAM(s) Oral daily    MEDICATIONS  (PRN):             *******************************  Usha Reyes MD (PGY-1)  Internal Medicine  Contact via [employer] TEAMS  *******************************    YOUNGER, JONATHAN  68y  Female    Patient is a 68y old  Female who presents with a chief complaint of No PO intake, lethargy (06 Jan 2024 06:56)    Subjective: No acute events overnight. Seen at bedside this morning. Patient opened eyes to voice. Unable to participate in ROS due to mental status.     Objective:  T(C): 36.4 (01-07-24 @ 05:24), Max: 36.7 (01-06-24 @ 16:37)  HR: 85 (01-07-24 @ 05:24) (79 - 85)  BP: 111/70 (01-07-24 @ 05:24) (100/62 - 119/72)  RR: 18 (01-07-24 @ 05:24) (18 - 18)  SpO2: 97% (01-07-24 @ 05:24) (94% - 98%)  I&O's Summary    PHYSICAL EXAM:  GENERAL: NAD, cachetic, chronically ill appearing   HEAD:  Atraumatic, Normocephalic  EYES: EOMI, PERRLA, conjunctiva and sclera clear  ENMT: dry mucous membranes   NECK: Supple, trachea midline   NERVOUS SYSTEM:  Alert & Oriented X0, opens eyes to voice, cannot track   CHEST/LUNG: Clear to auscultation bilaterally; No rales, rhonchi, wheezing, or rubs  HEART: Regular rate and rhythm; No murmurs, rubs, or gallops  ABDOMEN: Soft, Nontender, Nondistended; Bowel sounds present  EXTREMITIES:  2+ Peripheral Pulses, contracted   LYMPH: No lymphadenopathy noted  SKIN: No rashes or lesions    LABS:                        9.1    9.46  )-----------( 357      ( 07 Jan 2024 07:23 )             28.7       01-07    138  |  105  |  8   ----------------------------<  110<H>  4.0   |  24  |  0.30<L>    Ca    8.0<L>      07 Jan 2024 07:21  Phos  2.7     01-07  Mg     2.3     01-07    TPro  5.6<L>  /  Alb  2.7<L>  /  TBili  0.3  /  DBili  x   /  AST  14  /  ALT  15  /  AlkPhos  76  01-07    Culture Results:   No growth at 5 days (12-29 @ 02:45)  Culture Results:   No growth at 5 days (12-29 @ 02:45)  Culture Results:   No growth (12-28 @ 12:23)  Culture Results:   No growth at 5 days (12-28 @ 01:32)  Culture Results:   >100,000 CFU/ml Escherichia coli (12-20 @ 11:54)  Culture Results:   No growth at 5 days (12-20 @ 11:30)  Culture Results:   No growth at 5 days (12-20 @ 11:15)    CAPILLARY BLOOD GLUCOSE  POCT Blood Glucose.: 116 mg/dL (07 Jan 2024 06:48)  POCT Blood Glucose.: 131 mg/dL (07 Jan 2024 00:37)  POCT Blood Glucose.: 139 mg/dL (06 Jan 2024 21:20)  POCT Blood Glucose.: 134 mg/dL (06 Jan 2024 12:07)    RADIOLOGY & ADDITIONAL TESTS:    MEDICATIONS  (STANDING):  ascorbic acid 500 milliGRAM(s) Oral daily  collagenase Ointment 1 Application(s) Topical daily  folic acid 1 milliGRAM(s) Oral daily  levothyroxine Injectable 56 MICROGram(s) IV Push at bedtime  multivitamin 1 Tablet(s) Oral daily  pantoprazole   Suspension 40 milliGRAM(s) Oral daily  polyethylene glycol 3350 17 Gram(s) Oral daily  senna 2 Tablet(s) Oral at bedtime  sertraline 25 milliGRAM(s) Oral daily  thiamine 100 milliGRAM(s) Oral daily    MEDICATIONS  (PRN):

## 2024-01-07 NOTE — SWALLOW BEDSIDE ASSESSMENT ADULT - MUCOSAL QUALITY
+xerostomia; improved w/ gentle oral care
+xerostomia w/ thick secretions along lingual surface, labial surface, hard palate, dentition, and  from soft palate to back wall of oropharynx; improved w/ aggressive oral care
+xerostomia w/ thick dried secretions along lingual, labial, soft palate, and dental structures; improved w/ gentle oral care

## 2024-01-07 NOTE — SWALLOW BEDSIDE ASSESSMENT ADULT - ASR SWALLOW ASPIRATION MONITOR
change of breathing pattern/cough/gurgly voice/fever/pneumonia/throat clearing/upper respiratory infection

## 2024-01-07 NOTE — PROGRESS NOTE ADULT - PROBLEM SELECTOR PLAN 2
RRT on 12/28 for hypoxia, currently on HFNC. Suspect likely aspiration pneumonitis. CXR negative for focal consolidations.  Resolved, on RA.     - completed zosyn for 5 days  (12/29 -1/2 )  - f/u blood cultures RRT on 12/28 for hypoxia, currently on HFNC. Suspect likely aspiration pneumonitis. CXR negative for focal consolidations.  Resolved, on RA.     - completed zosyn for 5 days  (12/29 -1/2 )

## 2024-01-07 NOTE — SWALLOW BEDSIDE ASSESSMENT ADULT - PHARYNGEAL PHASE
Pharyngeal stage of swallow was not initiated on this exam
Audible swallows; multiple swallows x2 per bolus; no overt s/s of laryngeal penetration/aspiration noted/Delayed pharyngeal swallow/Decreased laryngeal elevation
Multiple swallows x2 per bolus; no overt s/s of laryngeal penetration/aspiration/Delayed pharyngeal swallow/Decreased laryngeal elevation

## 2024-01-07 NOTE — SWALLOW BEDSIDE ASSESSMENT ADULT - SPECIFY REASON(S)
to subjectively re-assess swallow safety/function
to subjectively assess swallow safety/function and r/o dysphagia
to subjectively re-assess swallow safety/function and determine eligibility for objective testing

## 2024-01-07 NOTE — SWALLOW BEDSIDE ASSESSMENT ADULT - ASR SWALLOW RECOMMEND DIAG
Pt is not appropriate for objective testing at this time
objective testing is unlikely to change clinical management
Objective testing is unlikely to change clinical management

## 2024-01-07 NOTE — SWALLOW BEDSIDE ASSESSMENT ADULT - SWALLOW EVAL: PATIENT/FAMILY GOALS STATEMENT
Pt unable to state; Family initially interested in PEG placement 2/2 poor PO intake, however, family deferred PEG on 1/3; GOC conversation is ongoing

## 2024-01-07 NOTE — SWALLOW BEDSIDE ASSESSMENT ADULT - SWALLOW EVAL: RECOMMENDED DIET
NPO w/ alternative means of nutrition/hydration/medication
Puree/moderately thick liquids via 1/2 TSP AMOUNT ONLY
Puree/moderately thick liquids; ALL INTAKE VIA TSP; FEED ONLY WHEN AWAKE/ALERT

## 2024-01-07 NOTE — SWALLOW BEDSIDE ASSESSMENT ADULT - SWALLOW EVAL: DIAGNOSIS
68y F w/ PMHx of TBI s/p fall (residual aphasia, L-sided weakness, and dysphagia at baseline), CVA, and meningioma presents w/ AMS and poor PO intake. Pt found to have significant hypernatremia, a positive UA, and now w/ fever. Pt presents w/ a severe oral dysphagia characterized by poor secretion management evidenced by thick secretions within the oropharynx, anterior spillage, and absent AP transit of bolus. Pharyngeal stage of swallow was not initiated on this exam despite multimodal cues. Given severity of oral stage dysphagia, Pt should remain NPO w/ alternative means of nutrition/hydration/medication. Pt is not a candidate for objective testing at this time 2/2 poor secretion management and absent pharyngeal swallow. Consider GOC conversation w/ Pt/family to determine provisions of nutrition/hydration. This service will continue to follow Pt pending GOC conversation.
68y F w/ PMHx of TBI s/p fall (residual aphasia, L-sided weakness, and dysphagia at baseline), CVA, and meningioma presents w/ AMS and poor PO intake. Hospital course c/b significant hypernatremia, UTI, and AHRF likely 2/2 aspiration pneumonitis. Pt presents w/ an oral and suspected pharyngeal dysphagia characterized by reduced bolus manipulation, prolonged AP transit time, suspected premature spillover w/ delayed onset of pharyngeal swallow, reduced hyolaryngeal excursion, and multiple swallows x2 per bolus. No overt s/s of laryngeal penetration/aspiration noted across PO trials. Current swallow profile is consistent w/ Pt's baseline. Given poor PO intake at baseline, consider a calorie count to monitor for adequate nutrition/hydration. GOC conversation w/ family is ongoing.
68y F w/ PMHx of TBI s/p fall (residual aphasia, L-sided weakness, and dysphagia at baseline), CVA, and meningioma presents w/ AMS and poor PO intake. Pt found to have significant hypernatremia and a positive UA. Pt presents w/ an oral and suspected pharyngeal dysphagia characterized by reduced bolus manipulation, prolonged AP transit time, suspected premature spillover w/ delayed onset of pharyngeal swallow, reduced hyolaryngeal excursion, audible swallows, and multiple swallows x2 per bolus. No overt s/s of laryngeal penetration/aspiration noted across PO trials. Bolus size modification (1/2 tsp) improves swallow profile significantly. Given poor PO intake at baseline, consider continuing supplemental nutrition via NGT and calorie count to monitor for adequate nutrition/hydration. Continue GOC conversation to determine Pt/family wishes re: provisions of nutrition/hydration.

## 2024-01-07 NOTE — SWALLOW BEDSIDE ASSESSMENT ADULT - DIET PRIOR TO ADMI
Puree/moderately thick liquids via tsp; NKFA
Unknown; NKFA
Puree/moderately thick liquids via tsp; NKFA

## 2024-01-07 NOTE — PROGRESS NOTE ADULT - PROBLEM SELECTOR PLAN 1
Hx of dysphagia since 2-3 months ago per son. Notable for severe b/l ext contracture and cachexia. Low PO intake at baseline, no PO intake in the last 2-3 days. NGT placed 12/22, trickle feeds.   S&S: Puree/moderately thick liquids via 1/2 tsp amount only; however pt unlikely to get adequate calories. Repeat assessment recommend NPO.   NG tube placed 1/3     - continue tube feeds   - f/u repeat swallow study   - GI consulted for PEG, ongoing GOC on final decision from family.  would like to explore any reversible causes prior to PEG placement. however discussed w/  worsening PO intake and dysphagia likely 2/2 to progression of disease, unlikely reversible.  would like repeat swallow study Hx of dysphagia since 2-3 months ago per son. Notable for severe b/l ext contracture and cachexia. Low PO intake at baseline, no PO intake in the last 2-3 days. NGT placed 12/22, trickle feeds.   S&S: Puree/moderately thick liquids via 1/2 tsp amount only; however pt unlikely to get adequate calories. Repeat assessment recommend NPO.   NG tube placed 1/3     - continue tube feeds   - f/u repeat swallow study   - GI consulted for PEG, ongoing GOC on final decision from family.  would like to explore any reversible causes prior to PEG placement. however discussed w/  worsening PO intake and dysphagia likely 2/2 to progression of disease, unlikely reversible.  would like repeat swallow study prior to GI reeval

## 2024-01-07 NOTE — SWALLOW BEDSIDE ASSESSMENT ADULT - SWALLOW EVAL: CRITERIA FOR SKILLED INTERVENTION MET
pending GOC conversation
This service will follow-up to monitor diet tolerance
Pending GOC conversation

## 2024-01-07 NOTE — PROGRESS NOTE ADULT - PROBLEM SELECTOR PLAN 3
Hx of recurrent UTI (last admission 9/2023). Recent urine culture showing 100k Ecoli. S/p IV ceftriaxone (12/21-23). Febrile overnight 12/27, repeat U/A positive. Restarted on ceftriaxone (12/28-29) but escalated in setting of ongoing aspiration pneumonia.     - s/p Zosyn (12/29-1/2) Hx of recurrent UTI (last admission 9/2023). Recent urine culture showing 100k Ecoli. S/p IV ceftriaxone (12/21-23). Febrile overnight 12/27, repeat U/A positive. Restarted on ceftriaxone (12/28-29) but escalated in setting of ongoing aspiration pneumonia.     resolved   - s/p Zosyn (12/29-1/2)

## 2024-01-07 NOTE — SWALLOW BEDSIDE ASSESSMENT ADULT - ORAL PHASE
suspected premature spillover 2/2 reduced bolus control/Delayed oral transit time
suspected premature spillover 2/2 reduced bolus control/Delayed oral transit time
Absent AP transit despite multimodal cueing; material subsequently suctioned from oral cavity by SLP

## 2024-01-07 NOTE — SWALLOW BEDSIDE ASSESSMENT ADULT - ORAL PREPARATORY PHASE
Anterior loss of bolus
poor bolus manipulation/Reduced oral grading
poor bolus manipulation/Reduced oral grading

## 2024-01-08 LAB
ALBUMIN SERPL ELPH-MCNC: 2.9 G/DL — LOW (ref 3.3–5)
ALBUMIN SERPL ELPH-MCNC: 2.9 G/DL — LOW (ref 3.3–5)
ALP SERPL-CCNC: 74 U/L — SIGNIFICANT CHANGE UP (ref 40–120)
ALP SERPL-CCNC: 74 U/L — SIGNIFICANT CHANGE UP (ref 40–120)
ALT FLD-CCNC: 13 U/L — SIGNIFICANT CHANGE UP (ref 10–45)
ALT FLD-CCNC: 13 U/L — SIGNIFICANT CHANGE UP (ref 10–45)
ANION GAP SERPL CALC-SCNC: 10 MMOL/L — SIGNIFICANT CHANGE UP (ref 5–17)
ANION GAP SERPL CALC-SCNC: 10 MMOL/L — SIGNIFICANT CHANGE UP (ref 5–17)
AST SERPL-CCNC: 11 U/L — SIGNIFICANT CHANGE UP (ref 10–40)
AST SERPL-CCNC: 11 U/L — SIGNIFICANT CHANGE UP (ref 10–40)
BILIRUB SERPL-MCNC: 0.3 MG/DL — SIGNIFICANT CHANGE UP (ref 0.2–1.2)
BILIRUB SERPL-MCNC: 0.3 MG/DL — SIGNIFICANT CHANGE UP (ref 0.2–1.2)
BUN SERPL-MCNC: 10 MG/DL — SIGNIFICANT CHANGE UP (ref 7–23)
BUN SERPL-MCNC: 10 MG/DL — SIGNIFICANT CHANGE UP (ref 7–23)
CALCIUM SERPL-MCNC: 8.2 MG/DL — LOW (ref 8.4–10.5)
CALCIUM SERPL-MCNC: 8.2 MG/DL — LOW (ref 8.4–10.5)
CHLORIDE SERPL-SCNC: 103 MMOL/L — SIGNIFICANT CHANGE UP (ref 96–108)
CHLORIDE SERPL-SCNC: 103 MMOL/L — SIGNIFICANT CHANGE UP (ref 96–108)
CO2 SERPL-SCNC: 25 MMOL/L — SIGNIFICANT CHANGE UP (ref 22–31)
CO2 SERPL-SCNC: 25 MMOL/L — SIGNIFICANT CHANGE UP (ref 22–31)
CREAT SERPL-MCNC: 0.31 MG/DL — LOW (ref 0.5–1.3)
CREAT SERPL-MCNC: 0.31 MG/DL — LOW (ref 0.5–1.3)
EGFR: 113 ML/MIN/1.73M2 — SIGNIFICANT CHANGE UP
EGFR: 113 ML/MIN/1.73M2 — SIGNIFICANT CHANGE UP
GLUCOSE BLDC GLUCOMTR-MCNC: 110 MG/DL — HIGH (ref 70–99)
GLUCOSE BLDC GLUCOMTR-MCNC: 110 MG/DL — HIGH (ref 70–99)
GLUCOSE BLDC GLUCOMTR-MCNC: 119 MG/DL — HIGH (ref 70–99)
GLUCOSE BLDC GLUCOMTR-MCNC: 119 MG/DL — HIGH (ref 70–99)
GLUCOSE BLDC GLUCOMTR-MCNC: 131 MG/DL — HIGH (ref 70–99)
GLUCOSE BLDC GLUCOMTR-MCNC: 131 MG/DL — HIGH (ref 70–99)
GLUCOSE BLDC GLUCOMTR-MCNC: 133 MG/DL — HIGH (ref 70–99)
GLUCOSE BLDC GLUCOMTR-MCNC: 133 MG/DL — HIGH (ref 70–99)
GLUCOSE SERPL-MCNC: 125 MG/DL — HIGH (ref 70–99)
GLUCOSE SERPL-MCNC: 125 MG/DL — HIGH (ref 70–99)
HCT VFR BLD CALC: 29 % — LOW (ref 34.5–45)
HCT VFR BLD CALC: 29 % — LOW (ref 34.5–45)
HGB BLD-MCNC: 9 G/DL — LOW (ref 11.5–15.5)
HGB BLD-MCNC: 9 G/DL — LOW (ref 11.5–15.5)
MAGNESIUM SERPL-MCNC: 2.2 MG/DL — SIGNIFICANT CHANGE UP (ref 1.6–2.6)
MAGNESIUM SERPL-MCNC: 2.2 MG/DL — SIGNIFICANT CHANGE UP (ref 1.6–2.6)
MCHC RBC-ENTMCNC: 26.9 PG — LOW (ref 27–34)
MCHC RBC-ENTMCNC: 26.9 PG — LOW (ref 27–34)
MCHC RBC-ENTMCNC: 31 GM/DL — LOW (ref 32–36)
MCHC RBC-ENTMCNC: 31 GM/DL — LOW (ref 32–36)
MCV RBC AUTO: 86.8 FL — SIGNIFICANT CHANGE UP (ref 80–100)
MCV RBC AUTO: 86.8 FL — SIGNIFICANT CHANGE UP (ref 80–100)
NRBC # BLD: 0 /100 WBCS — SIGNIFICANT CHANGE UP (ref 0–0)
NRBC # BLD: 0 /100 WBCS — SIGNIFICANT CHANGE UP (ref 0–0)
PHOSPHATE SERPL-MCNC: 2.7 MG/DL — SIGNIFICANT CHANGE UP (ref 2.5–4.5)
PHOSPHATE SERPL-MCNC: 2.7 MG/DL — SIGNIFICANT CHANGE UP (ref 2.5–4.5)
PLATELET # BLD AUTO: 334 K/UL — SIGNIFICANT CHANGE UP (ref 150–400)
PLATELET # BLD AUTO: 334 K/UL — SIGNIFICANT CHANGE UP (ref 150–400)
POTASSIUM SERPL-MCNC: 4.3 MMOL/L — SIGNIFICANT CHANGE UP (ref 3.5–5.3)
POTASSIUM SERPL-MCNC: 4.3 MMOL/L — SIGNIFICANT CHANGE UP (ref 3.5–5.3)
POTASSIUM SERPL-SCNC: 4.3 MMOL/L — SIGNIFICANT CHANGE UP (ref 3.5–5.3)
POTASSIUM SERPL-SCNC: 4.3 MMOL/L — SIGNIFICANT CHANGE UP (ref 3.5–5.3)
PROT SERPL-MCNC: 5.9 G/DL — LOW (ref 6–8.3)
PROT SERPL-MCNC: 5.9 G/DL — LOW (ref 6–8.3)
RBC # BLD: 3.34 M/UL — LOW (ref 3.8–5.2)
RBC # BLD: 3.34 M/UL — LOW (ref 3.8–5.2)
RBC # FLD: 16.5 % — HIGH (ref 10.3–14.5)
RBC # FLD: 16.5 % — HIGH (ref 10.3–14.5)
SODIUM SERPL-SCNC: 138 MMOL/L — SIGNIFICANT CHANGE UP (ref 135–145)
SODIUM SERPL-SCNC: 138 MMOL/L — SIGNIFICANT CHANGE UP (ref 135–145)
WBC # BLD: 9.01 K/UL — SIGNIFICANT CHANGE UP (ref 3.8–10.5)
WBC # BLD: 9.01 K/UL — SIGNIFICANT CHANGE UP (ref 3.8–10.5)
WBC # FLD AUTO: 9.01 K/UL — SIGNIFICANT CHANGE UP (ref 3.8–10.5)
WBC # FLD AUTO: 9.01 K/UL — SIGNIFICANT CHANGE UP (ref 3.8–10.5)

## 2024-01-08 PROCEDURE — 99232 SBSQ HOSP IP/OBS MODERATE 35: CPT

## 2024-01-08 PROCEDURE — 71045 X-RAY EXAM CHEST 1 VIEW: CPT | Mod: 26

## 2024-01-08 RX ORDER — CHLORHEXIDINE GLUCONATE 213 G/1000ML
1 SOLUTION TOPICAL DAILY
Refills: 0 | Status: DISCONTINUED | OUTPATIENT
Start: 2024-01-08 | End: 2024-01-18

## 2024-01-08 RX ADMIN — Medication 1 APPLICATION(S): at 13:01

## 2024-01-08 RX ADMIN — Medication 100 MILLIGRAM(S): at 13:00

## 2024-01-08 RX ADMIN — Medication 500 MILLIGRAM(S): at 13:00

## 2024-01-08 RX ADMIN — Medication 1 TABLET(S): at 13:00

## 2024-01-08 RX ADMIN — SERTRALINE 25 MILLIGRAM(S): 25 TABLET, FILM COATED ORAL at 13:00

## 2024-01-08 RX ADMIN — ENOXAPARIN SODIUM 40 MILLIGRAM(S): 100 INJECTION SUBCUTANEOUS at 14:36

## 2024-01-08 RX ADMIN — Medication 56 MICROGRAM(S): at 21:47

## 2024-01-08 RX ADMIN — CHLORHEXIDINE GLUCONATE 1 APPLICATION(S): 213 SOLUTION TOPICAL at 12:58

## 2024-01-08 RX ADMIN — PANTOPRAZOLE SODIUM 40 MILLIGRAM(S): 20 TABLET, DELAYED RELEASE ORAL at 13:02

## 2024-01-08 RX ADMIN — Medication 1 MILLIGRAM(S): at 13:01

## 2024-01-08 NOTE — PROGRESS NOTE ADULT - PROBLEM SELECTOR PLAN 3
Hx of recurrent UTI (last admission 9/2023). Recent urine culture showing 100k Ecoli. S/p IV ceftriaxone (12/21-23). Febrile overnight 12/27, repeat U/A positive. Restarted on ceftriaxone (12/28-29) but escalated in setting of ongoing aspiration pneumonia.     resolved   - s/p Zosyn (12/29-1/2)

## 2024-01-08 NOTE — PROGRESS NOTE ADULT - PROBLEM SELECTOR PLAN 2
RRT on 12/28 for hypoxia, currently on HFNC. Suspect likely aspiration pneumonitis. CXR negative for focal consolidations.  Resolved, on RA.     - completed zosyn for 5 days  (12/29 -1/2 )

## 2024-01-08 NOTE — PROGRESS NOTE ADULT - SUBJECTIVE AND OBJECTIVE BOX
***************************************************************  Bassme Hernandez, PG1  Internal Medicine   Pager:  TEAMS  ***************************************************************    JONATHAN YOUNGER  68y  MRN: [id number]    Patient is a 68y old  Female who presents with a chief complaint of No PO intake, lethargy (08 Jan 2024 07:09)      Interval/Overnight Events: no events ON.     Subjective: Pt seen and examined at bedside. Unable to respond to questions but appears to move tongue on command    MEDICATIONS  (STANDING):  ascorbic acid 500 milliGRAM(s) Oral daily  chlorhexidine 2% Cloths 1 Application(s) Topical daily  collagenase Ointment 1 Application(s) Topical daily  enoxaparin Injectable 40 milliGRAM(s) SubCutaneous every 24 hours  folic acid 1 milliGRAM(s) Oral daily  levothyroxine Injectable 56 MICROGram(s) IV Push at bedtime  multivitamin 1 Tablet(s) Oral daily  pantoprazole   Suspension 40 milliGRAM(s) Oral daily  polyethylene glycol 3350 17 Gram(s) Oral daily  senna 2 Tablet(s) Oral at bedtime  sertraline 25 milliGRAM(s) Oral daily  thiamine 100 milliGRAM(s) Oral daily    MEDICATIONS  (PRN):      Objective:    Vitals: Vital Signs Last 24 Hrs  T(C): 36.6 (01-08-24 @ 05:42), Max: 36.7 (01-07-24 @ 16:57)  T(F): 97.9 (01-08-24 @ 05:42), Max: 98.1 (01-08-24 @ 00:42)  HR: 77 (01-08-24 @ 05:42) (77 - 100)  BP: 112/67 (01-08-24 @ 05:42) (90/63 - 112/67)  BP(mean): --  RR: 18 (01-08-24 @ 05:42) (18 - 18)  SpO2: 96% (01-08-24 @ 05:42) (96% - 97%)                I&O's Summary      PHYSICAL EXAM:  GENERAL: NAD, cachetic, chronically ill appearing   HEAD:  Atraumatic, Normocephalic  EYES: PERRL, conjunctiva and sclera clear  ENMT: dry mucous membranes   NECK: Supple  CHEST/LUNG: Clear to auscultation bilaterally; No rales, rhonchi, wheezing, or rubs  HEART: Regular rate and rhythm; No murmurs, rubs, or gallops  ABDOMEN: Soft, Nontender, Nondistended; Bowel sounds present  EXTREMITIES:  no clubbing, cyanosis, or edema   SKIN: No rashes or lesions  NERVOUS SYSTEM:  Alert & Oriented X0, opens eyes to voice, cannot track, contracted extremities, moves tongue on command  PSYCH: Unable to assess    LABS:                        9.0    9.01  )-----------( 334      ( 08 Jan 2024 07:18 )             29.0                         9.1    9.46  )-----------( 357      ( 07 Jan 2024 07:23 )             28.7                         9.6    9.07  )-----------( 337      ( 06 Jan 2024 06:55 )             29.9     01-08    138  |  103  |  10  ----------------------------<  125<H>  4.3   |  25  |  0.31<L>  01-07    138  |  105  |  8   ----------------------------<  110<H>  4.0   |  24  |  0.30<L>  01-06    138  |  104  |  8   ----------------------------<  119<H>  4.0   |  24  |  <0.30<L>    Ca    8.2<L>      08 Jan 2024 07:13  Ca    8.0<L>      07 Jan 2024 07:21  Ca    8.1<L>      06 Jan 2024 06:58  Phos  2.7     01-08  Mg     2.2     01-08    TPro  5.9<L>  /  Alb  2.9<L>  /  TBili  0.3  /  DBili  x   /  AST  11  /  ALT  13  /  AlkPhos  74  01-08  TPro  5.6<L>  /  Alb  2.7<L>  /  TBili  0.3  /  DBili  x   /  AST  14  /  ALT  15  /  AlkPhos  76  01-07  TPro  5.7<L>  /  Alb  2.3<L>  /  TBili  0.4  /  DBili  x   /  AST  13  /  ALT  12  /  AlkPhos  77  01-06    CAPILLARY BLOOD GLUCOSE      POCT Blood Glucose.: 119 mg/dL (08 Jan 2024 05:53)  POCT Blood Glucose.: 110 mg/dL (08 Jan 2024 00:10)  POCT Blood Glucose.: 127 mg/dL (07 Jan 2024 18:03)  POCT Blood Glucose.: 114 mg/dL (07 Jan 2024 12:02)        Urinalysis Basic - ( 08 Jan 2024 07:13 )    Color: x / Appearance: x / SG: x / pH: x  Gluc: 125 mg/dL / Ketone: x  / Bili: x / Urobili: x   Blood: x / Protein: x / Nitrite: x   Leuk Esterase: x / RBC: x / WBC x   Sq Epi: x / Non Sq Epi: x / Bacteria: x          RADIOLOGY & ADDITIONAL TESTS:    Imaging Personally Reviewed:  [x ] YES  [ ] NO    Consultants involved in case:   Consultant(s) Notes Reviewed:  [ x] YES  [ ] NO:   Care Discussed with Consultants/Other Providers [x ] YES  [ ] NO         ***************************************************************  Bassem Hernandez, PG1  Internal Medicine   Pager:  TEAMS  ***************************************************************    JONATHAN YOUNGER  68y  MRN: [id number]    Patient is a 68y old  Female who presents with a chief complaint of No PO intake, lethargy (08 Jan 2024 07:09)      Interval/Overnight Events: no events ON.     Subjective: Pt seen and examined at bedside. Unable to respond to questions but appears to move tongue on command    MEDICATIONS  (STANDING):  ascorbic acid 500 milliGRAM(s) Oral daily  chlorhexidine 2% Cloths 1 Application(s) Topical daily  collagenase Ointment 1 Application(s) Topical daily  enoxaparin Injectable 40 milliGRAM(s) SubCutaneous every 24 hours  folic acid 1 milliGRAM(s) Oral daily  levothyroxine Injectable 56 MICROGram(s) IV Push at bedtime  multivitamin 1 Tablet(s) Oral daily  pantoprazole   Suspension 40 milliGRAM(s) Oral daily  polyethylene glycol 3350 17 Gram(s) Oral daily  senna 2 Tablet(s) Oral at bedtime  sertraline 25 milliGRAM(s) Oral daily  thiamine 100 milliGRAM(s) Oral daily    MEDICATIONS  (PRN):      Objective:    Vitals: Vital Signs Last 24 Hrs  T(C): 36.6 (01-08-24 @ 05:42), Max: 36.7 (01-07-24 @ 16:57)  T(F): 97.9 (01-08-24 @ 05:42), Max: 98.1 (01-08-24 @ 00:42)  HR: 77 (01-08-24 @ 05:42) (77 - 100)  BP: 112/67 (01-08-24 @ 05:42) (90/63 - 112/67)  BP(mean): --  RR: 18 (01-08-24 @ 05:42) (18 - 18)  SpO2: 96% (01-08-24 @ 05:42) (96% - 97%)                I&O's Summary      PHYSICAL EXAM:  GENERAL: NAD, cachetic, chronically ill appearing   HEAD:  Atraumatic, Normocephalic  EYES: PERRL, conjunctiva and sclera clear  ENMT: dry mucous membranes   NECK: Supple  CHEST/LUNG: Clear to auscultation bilaterally; No rales, rhonchi, wheezing, or rubs  HEART: Regular rate and rhythm; No murmurs, rubs, or gallops  ABDOMEN: Soft, Nontender, Nondistended; Bowel sounds present  EXTREMITIES:  no clubbing, cyanosis, or edema   SKIN: No rashes or lesions  NERVOUS SYSTEM:  Alert & Oriented X0, opens eyes to voice, cannot track, contracted extremities, moves tongue on command  PSYCH: Unable to assess    LABS:                        9.0    9.01  )-----------( 334      ( 08 Jan 2024 07:18 )             29.0                         9.1    9.46  )-----------( 357      ( 07 Jan 2024 07:23 )             28.7                         9.6    9.07  )-----------( 337      ( 06 Jan 2024 06:55 )             29.9     01-08    138  |  103  |  10  ----------------------------<  125<H>  4.3   |  25  |  0.31<L>  01-07    138  |  105  |  8   ----------------------------<  110<H>  4.0   |  24  |  0.30<L>  01-06    138  |  104  |  8   ----------------------------<  119<H>  4.0   |  24  |  <0.30<L>    Ca    8.2<L>      08 Jan 2024 07:13  Ca    8.0<L>      07 Jan 2024 07:21  Ca    8.1<L>      06 Jan 2024 06:58  Phos  2.7     01-08  Mg     2.2     01-08    TPro  5.9<L>  /  Alb  2.9<L>  /  TBili  0.3  /  DBili  x   /  AST  11  /  ALT  13  /  AlkPhos  74  01-08  TPro  5.6<L>  /  Alb  2.7<L>  /  TBili  0.3  /  DBili  x   /  AST  14  /  ALT  15  /  AlkPhos  76  01-07  TPro  5.7<L>  /  Alb  2.3<L>  /  TBili  0.4  /  DBili  x   /  AST  13  /  ALT  12  /  AlkPhos  77  01-06    CAPILLARY BLOOD GLUCOSE      POCT Blood Glucose.: 119 mg/dL (08 Jan 2024 05:53)  POCT Blood Glucose.: 110 mg/dL (08 Jan 2024 00:10)  POCT Blood Glucose.: 127 mg/dL (07 Jan 2024 18:03)  POCT Blood Glucose.: 114 mg/dL (07 Jan 2024 12:02)        Urinalysis Basic - ( 08 Jan 2024 07:13 )    Color: x / Appearance: x / SG: x / pH: x  Gluc: 125 mg/dL / Ketone: x  / Bili: x / Urobili: x   Blood: x / Protein: x / Nitrite: x   Leuk Esterase: x / RBC: x / WBC x   Sq Epi: x / Non Sq Epi: x / Bacteria: x          RADIOLOGY & ADDITIONAL TESTS:    Imaging Personally Reviewed:  [x ] YES  [ ] NO    Consultants involved in case:   Consultant(s) Notes Reviewed:  [ x] YES  [ ] NO:   Care Discussed with Consultants/Other Providers [x ] YES  [ ] NO

## 2024-01-08 NOTE — PROGRESS NOTE ADULT - ASSESSMENT
68F with PMH significant for breast cancer s/p L mastectomy, TBI (s/p a fall ~2 years ago) c/b aphasia, meningioma, hypothyroidism (hx Grave's dz s/p radioactive iodine), CVA, and dysphagia who presents to [facility] ED on 12/20/2023 with no PO intake and increased lethargy in the last 2-3 days, admitted for hypernatremia 166 and UTI. Hospital course complicated by AHRF likely 2/2 to aspiration pneumonitis, pending further GOC conversation for PEG tube placement.    68F with PMH significant for breast cancer s/p L mastectomy, TBI (s/p a fall ~2 years ago) c/b aphasia, meningioma, hypothyroidism (hx Grave's dz s/p radioactive iodine), CVA, and dysphagia who presents to [facility] ED on 12/20/2023 with no PO intake and increased lethargy in the last 2-3 days, admitted for hypernatremia 166 and UTI. Hospital course complicated by AHRF likely 2/2 to aspiration pneumonitis, pending further GOC conversation for PEG tube placement.

## 2024-01-08 NOTE — PROGRESS NOTE ADULT - ATTENDING COMMENTS
Acute hypoxic respiratory failure secondary to likely aspiration- resolved- complete 5 days Zosyn. Now on RA SaOs 96-98%  Dysphagia of unclear etiology with severe malnutrition- Feeding resumed via NGT,  indecisive regarding PEG tube, request repeat S & S eval --s/p repeat S & S 1/7, rec Puree/moderately thick liquids; ALL INTAKE VIA TSP; FEED ONLY WHEN AWAKE/ALERT----however considering patient's persistent poor mental status, this AM patient eyes open, mouth open with tongue movements, moves both hands, did not track, did not answer any questions, will hold off starting oral feed due to extremely high risk with aspiration. Will f/u with further GOC discussion with /family.   Hypernatremia- resolved, resumed free water boluses via NGT  Functional quadriplegia with multiple pressure ulcers- wound care following   Hypothyroidism- Continue levothyroxine IV 56 mcg QD- transition to enteral once route secured   Palliative care consulted- spouse requested further evaluation and treatement for reversible causes of current conditions and requested MRI brain which demonstrated no recent ischemia, intracranial hemorrhages or enhancing mass lesions.   Prognosis guarded- remains full code.    D/w HS Team

## 2024-01-08 NOTE — PROGRESS NOTE ADULT - PROBLEM SELECTOR PLAN 10
- DVT ppx: Lovenox stopped due to concern for bleed, SCDs in place  - Diet: NG tube placement   - Dispo: pending further GOC discussions - DVT ppx: Lovenox stopped due to concern for bleed, SCDs in place  - Diet: NG tube, hold moderately thickened liquids/pureed diet  - Dispo: pending further GOC discussions

## 2024-01-08 NOTE — PROGRESS NOTE ADULT - PROBLEM SELECTOR PLAN 1
Hx of dysphagia since 2-3 months ago per son. Notable for severe b/l ext contracture and cachexia. Low PO intake at baseline, no PO intake in the last 2-3 days. NGT placed 12/22, trickle feeds.   S&S: Puree/moderately thick liquids via 1/2 tsp amount only; however pt unlikely to get adequate calories. Repeat assessment recommend NPO.   NG tube placed 1/3     - continue tube feeds   - f/u repeat swallow study   - GI consulted for PEG, ongoing GOC on final decision from family.  would like to explore any reversible causes prior to PEG placement. however discussed w/  worsening PO intake and dysphagia likely 2/2 to progression of disease, unlikely reversible.  would like repeat swallow study prior to GI reeval Hx of dysphagia since 2-3 months ago per son. Notable for severe b/l ext contracture and cachexia. Low PO intake at baseline, no PO intake in the last 2-3 days. NGT placed 12/22, trickle feeds.   S&S: Puree/moderately thick liquids via 1/2 tsp amount only; however pt unlikely to get adequate calories. Repeat assessment recommend NPO.   NG tube placed 1/3     - continue tube feeds   - GI consulted for PEG, ongoing GOC on final decision from family.  would like to explore any reversible causes prior to PEG placement. however discussed w/  worsening PO intake and dysphagia likely 2/2 to progression of disease, unlikely reversible.  would like repeat swallow study prior to GI reeval  - Repeat swallow eval with moderately thickened liquids or pureed diet to feed only when patient is awake and alert  - Calorie count if able to re-start PO feeding

## 2024-01-08 NOTE — PROGRESS NOTE ADULT - PROBLEM SELECTOR PLAN 6
P/w progressively worsening lethargy/encephalopathy i/s/o no PO intake for 2-3 days; at baseline, answers simple questions (Y/N, name) per son. Most likely ddx include metabolic (hypernatremia) vs infection (c/f UTI) vs mixed    - Currently AOx0, non-verbal, opens eyes to voice, unable to follow commands  - chronic hypernatremia resolved but likely ongoing infectious etiology P/w progressively worsening lethargy/encephalopathy i/s/o no PO intake for 2-3 days; at baseline, answers simple questions (Y/N, name) per son. Most likely ddx include metabolic (hypernatremia) vs infection (c/f UTI) vs mixed    - Currently AOx0, non-verbal, opens eyes to voice, unable to follow commands  - chronic hypernatremia resolved  - s/p brain MRI 1/4/2024: No recent ischemia, intracranial hemorrhages or enhancing mass lesions.

## 2024-01-08 NOTE — CHART NOTE - NSCHARTNOTEFT_GEN_A_CORE
Spoke with , Dr Silverio Arthur ([phone]) regarding restarting PO intake with regard to Mrs Yelitza Arthur.  Expressed that the patient waxes and wanes and that we have a concern that she may aspirate.  The  expressed understanding of the risk of aspiration and would like us to proceed with attempting PO intake with the moderately thickened liquid/pureed diet when the patient is alert.   also Spoke with , Dr Silverio Arthur ([phone]) regarding restarting PO intake with regard to Mrs Yelitza Arthur.  Expressed that the patient waxes and wanes and that we have a concern that she may aspirate.  The  expressed understanding of the risk of aspiration and would like us to proceed with attempting PO intake with the moderately thickened liquid/pureed diet when the patient is alert.   also Spoke with , Dr Silverio Arthur ([phone]) regarding restarting PO intake with regard to Mrs Yelitza Arthur.  Expressed that the patient waxes and wanes and that we have a concern that she may aspirate.  The  expressed understanding of the risk of aspiration and would like us to proceed with attempting PO intake with the moderately thickened liquid/pureed diet when the patient is alert.   also expressed interest in palliative care consult, as his family has been having difficulty caring for her at home.  Discussed with  that patient likely has not been meeting her caloric needs even while she was healthier and eating better, which is why she had become hypernatremic and lost weight.  Discussed that she may need PEG regardless of whether she could tolerate PO because she may not be able to keep up with her needs.   expressed understanding but would like for us to attempt to make up for her calorie deficit with tube feeds first with the thought that increased calories before trialing PEG.  He would like further workup of her dysphagia as well. Spoke with , Dr Silverio Arthur ([phone]) regarding restarting PO intake with regard to Mrs Yelitza Arthur.  Expressed that the patient waxes and wanes and that we have a concern that she may aspirate.  The  expressed understanding of the risk of aspiration and would like us to proceed with attempting PO intake with the moderately thickened liquid/pureed diet when the patient is alert.   also expressed interest in palliative care consult, as his family has been having difficulty caring for her at home.  Discussed with  that patient likely has not been meeting her caloric needs even while she was healthier and eating better, which is why she had become hypernatremic and lost weight.  Discussed that she may need PEG regardless of whether she could tolerate PO because she may not be able to keep up with her needs.   expressed understanding but would like for us to attempt to make up for her calorie deficit with tube feeds first with the thought that increased calories before trialing PEG.  He would like further workup of her dysphagia as well.

## 2024-01-09 LAB
ALBUMIN SERPL ELPH-MCNC: 3 G/DL — LOW (ref 3.3–5)
ALBUMIN SERPL ELPH-MCNC: 3 G/DL — LOW (ref 3.3–5)
ALP SERPL-CCNC: 72 U/L — SIGNIFICANT CHANGE UP (ref 40–120)
ALP SERPL-CCNC: 72 U/L — SIGNIFICANT CHANGE UP (ref 40–120)
ALT FLD-CCNC: 13 U/L — SIGNIFICANT CHANGE UP (ref 10–45)
ALT FLD-CCNC: 13 U/L — SIGNIFICANT CHANGE UP (ref 10–45)
ANION GAP SERPL CALC-SCNC: 9 MMOL/L — SIGNIFICANT CHANGE UP (ref 5–17)
ANION GAP SERPL CALC-SCNC: 9 MMOL/L — SIGNIFICANT CHANGE UP (ref 5–17)
AST SERPL-CCNC: 11 U/L — SIGNIFICANT CHANGE UP (ref 10–40)
AST SERPL-CCNC: 11 U/L — SIGNIFICANT CHANGE UP (ref 10–40)
BASOPHILS # BLD AUTO: 0.01 K/UL — SIGNIFICANT CHANGE UP (ref 0–0.2)
BASOPHILS # BLD AUTO: 0.01 K/UL — SIGNIFICANT CHANGE UP (ref 0–0.2)
BASOPHILS NFR BLD AUTO: 0.1 % — SIGNIFICANT CHANGE UP (ref 0–2)
BASOPHILS NFR BLD AUTO: 0.1 % — SIGNIFICANT CHANGE UP (ref 0–2)
BILIRUB SERPL-MCNC: 0.4 MG/DL — SIGNIFICANT CHANGE UP (ref 0.2–1.2)
BILIRUB SERPL-MCNC: 0.4 MG/DL — SIGNIFICANT CHANGE UP (ref 0.2–1.2)
BUN SERPL-MCNC: 11 MG/DL — SIGNIFICANT CHANGE UP (ref 7–23)
BUN SERPL-MCNC: 11 MG/DL — SIGNIFICANT CHANGE UP (ref 7–23)
CALCIUM SERPL-MCNC: 8.4 MG/DL — SIGNIFICANT CHANGE UP (ref 8.4–10.5)
CALCIUM SERPL-MCNC: 8.4 MG/DL — SIGNIFICANT CHANGE UP (ref 8.4–10.5)
CHLORIDE SERPL-SCNC: 101 MMOL/L — SIGNIFICANT CHANGE UP (ref 96–108)
CHLORIDE SERPL-SCNC: 101 MMOL/L — SIGNIFICANT CHANGE UP (ref 96–108)
CO2 SERPL-SCNC: 26 MMOL/L — SIGNIFICANT CHANGE UP (ref 22–31)
CO2 SERPL-SCNC: 26 MMOL/L — SIGNIFICANT CHANGE UP (ref 22–31)
CREAT SERPL-MCNC: 0.32 MG/DL — LOW (ref 0.5–1.3)
CREAT SERPL-MCNC: 0.32 MG/DL — LOW (ref 0.5–1.3)
EGFR: 112 ML/MIN/1.73M2 — SIGNIFICANT CHANGE UP
EGFR: 112 ML/MIN/1.73M2 — SIGNIFICANT CHANGE UP
EOSINOPHIL # BLD AUTO: 0.06 K/UL — SIGNIFICANT CHANGE UP (ref 0–0.5)
EOSINOPHIL # BLD AUTO: 0.06 K/UL — SIGNIFICANT CHANGE UP (ref 0–0.5)
EOSINOPHIL NFR BLD AUTO: 0.5 % — SIGNIFICANT CHANGE UP (ref 0–6)
EOSINOPHIL NFR BLD AUTO: 0.5 % — SIGNIFICANT CHANGE UP (ref 0–6)
GLUCOSE BLDC GLUCOMTR-MCNC: 108 MG/DL — HIGH (ref 70–99)
GLUCOSE BLDC GLUCOMTR-MCNC: 108 MG/DL — HIGH (ref 70–99)
GLUCOSE BLDC GLUCOMTR-MCNC: 121 MG/DL — HIGH (ref 70–99)
GLUCOSE BLDC GLUCOMTR-MCNC: 121 MG/DL — HIGH (ref 70–99)
GLUCOSE BLDC GLUCOMTR-MCNC: 126 MG/DL — HIGH (ref 70–99)
GLUCOSE BLDC GLUCOMTR-MCNC: 126 MG/DL — HIGH (ref 70–99)
GLUCOSE BLDC GLUCOMTR-MCNC: 132 MG/DL — HIGH (ref 70–99)
GLUCOSE BLDC GLUCOMTR-MCNC: 132 MG/DL — HIGH (ref 70–99)
GLUCOSE SERPL-MCNC: 126 MG/DL — HIGH (ref 70–99)
GLUCOSE SERPL-MCNC: 126 MG/DL — HIGH (ref 70–99)
HCT VFR BLD CALC: 30.1 % — LOW (ref 34.5–45)
HCT VFR BLD CALC: 30.1 % — LOW (ref 34.5–45)
HGB BLD-MCNC: 9.7 G/DL — LOW (ref 11.5–15.5)
HGB BLD-MCNC: 9.7 G/DL — LOW (ref 11.5–15.5)
IMM GRANULOCYTES NFR BLD AUTO: 0.6 % — SIGNIFICANT CHANGE UP (ref 0–0.9)
IMM GRANULOCYTES NFR BLD AUTO: 0.6 % — SIGNIFICANT CHANGE UP (ref 0–0.9)
LYMPHOCYTES # BLD AUTO: 1.01 K/UL — SIGNIFICANT CHANGE UP (ref 1–3.3)
LYMPHOCYTES # BLD AUTO: 1.01 K/UL — SIGNIFICANT CHANGE UP (ref 1–3.3)
LYMPHOCYTES # BLD AUTO: 9.1 % — LOW (ref 13–44)
LYMPHOCYTES # BLD AUTO: 9.1 % — LOW (ref 13–44)
MAGNESIUM SERPL-MCNC: 2.2 MG/DL — SIGNIFICANT CHANGE UP (ref 1.6–2.6)
MAGNESIUM SERPL-MCNC: 2.2 MG/DL — SIGNIFICANT CHANGE UP (ref 1.6–2.6)
MCHC RBC-ENTMCNC: 27.4 PG — SIGNIFICANT CHANGE UP (ref 27–34)
MCHC RBC-ENTMCNC: 27.4 PG — SIGNIFICANT CHANGE UP (ref 27–34)
MCHC RBC-ENTMCNC: 32.2 GM/DL — SIGNIFICANT CHANGE UP (ref 32–36)
MCHC RBC-ENTMCNC: 32.2 GM/DL — SIGNIFICANT CHANGE UP (ref 32–36)
MCV RBC AUTO: 85 FL — SIGNIFICANT CHANGE UP (ref 80–100)
MCV RBC AUTO: 85 FL — SIGNIFICANT CHANGE UP (ref 80–100)
MONOCYTES # BLD AUTO: 0.45 K/UL — SIGNIFICANT CHANGE UP (ref 0–0.9)
MONOCYTES # BLD AUTO: 0.45 K/UL — SIGNIFICANT CHANGE UP (ref 0–0.9)
MONOCYTES NFR BLD AUTO: 4.1 % — SIGNIFICANT CHANGE UP (ref 2–14)
MONOCYTES NFR BLD AUTO: 4.1 % — SIGNIFICANT CHANGE UP (ref 2–14)
NEUTROPHILS # BLD AUTO: 9.5 K/UL — HIGH (ref 1.8–7.4)
NEUTROPHILS # BLD AUTO: 9.5 K/UL — HIGH (ref 1.8–7.4)
NEUTROPHILS NFR BLD AUTO: 85.6 % — HIGH (ref 43–77)
NEUTROPHILS NFR BLD AUTO: 85.6 % — HIGH (ref 43–77)
NRBC # BLD: 0 /100 WBCS — SIGNIFICANT CHANGE UP (ref 0–0)
NRBC # BLD: 0 /100 WBCS — SIGNIFICANT CHANGE UP (ref 0–0)
PHOSPHATE SERPL-MCNC: 2.8 MG/DL — SIGNIFICANT CHANGE UP (ref 2.5–4.5)
PHOSPHATE SERPL-MCNC: 2.8 MG/DL — SIGNIFICANT CHANGE UP (ref 2.5–4.5)
PLATELET # BLD AUTO: 350 K/UL — SIGNIFICANT CHANGE UP (ref 150–400)
PLATELET # BLD AUTO: 350 K/UL — SIGNIFICANT CHANGE UP (ref 150–400)
POTASSIUM SERPL-MCNC: 4.3 MMOL/L — SIGNIFICANT CHANGE UP (ref 3.5–5.3)
POTASSIUM SERPL-MCNC: 4.3 MMOL/L — SIGNIFICANT CHANGE UP (ref 3.5–5.3)
POTASSIUM SERPL-SCNC: 4.3 MMOL/L — SIGNIFICANT CHANGE UP (ref 3.5–5.3)
POTASSIUM SERPL-SCNC: 4.3 MMOL/L — SIGNIFICANT CHANGE UP (ref 3.5–5.3)
PROT SERPL-MCNC: 6.2 G/DL — SIGNIFICANT CHANGE UP (ref 6–8.3)
PROT SERPL-MCNC: 6.2 G/DL — SIGNIFICANT CHANGE UP (ref 6–8.3)
RBC # BLD: 3.54 M/UL — LOW (ref 3.8–5.2)
RBC # BLD: 3.54 M/UL — LOW (ref 3.8–5.2)
RBC # FLD: 17 % — HIGH (ref 10.3–14.5)
RBC # FLD: 17 % — HIGH (ref 10.3–14.5)
SODIUM SERPL-SCNC: 136 MMOL/L — SIGNIFICANT CHANGE UP (ref 135–145)
SODIUM SERPL-SCNC: 136 MMOL/L — SIGNIFICANT CHANGE UP (ref 135–145)
WBC # BLD: 11.1 K/UL — HIGH (ref 3.8–10.5)
WBC # BLD: 11.1 K/UL — HIGH (ref 3.8–10.5)
WBC # FLD AUTO: 11.1 K/UL — HIGH (ref 3.8–10.5)
WBC # FLD AUTO: 11.1 K/UL — HIGH (ref 3.8–10.5)

## 2024-01-09 PROCEDURE — 99232 SBSQ HOSP IP/OBS MODERATE 35: CPT | Mod: GC

## 2024-01-09 RX ADMIN — Medication 500 MILLIGRAM(S): at 12:59

## 2024-01-09 RX ADMIN — CHLORHEXIDINE GLUCONATE 1 APPLICATION(S): 213 SOLUTION TOPICAL at 13:00

## 2024-01-09 RX ADMIN — Medication 1 TABLET(S): at 12:59

## 2024-01-09 RX ADMIN — PANTOPRAZOLE SODIUM 40 MILLIGRAM(S): 20 TABLET, DELAYED RELEASE ORAL at 12:58

## 2024-01-09 RX ADMIN — Medication 1 MILLIGRAM(S): at 12:59

## 2024-01-09 RX ADMIN — POLYETHYLENE GLYCOL 3350 17 GRAM(S): 17 POWDER, FOR SOLUTION ORAL at 12:58

## 2024-01-09 RX ADMIN — ENOXAPARIN SODIUM 40 MILLIGRAM(S): 100 INJECTION SUBCUTANEOUS at 15:03

## 2024-01-09 RX ADMIN — Medication 56 MICROGRAM(S): at 23:12

## 2024-01-09 RX ADMIN — Medication 100 MILLIGRAM(S): at 12:59

## 2024-01-09 RX ADMIN — SERTRALINE 25 MILLIGRAM(S): 25 TABLET, FILM COATED ORAL at 12:59

## 2024-01-09 RX ADMIN — Medication 1 APPLICATION(S): at 13:57

## 2024-01-09 NOTE — CHART NOTE - NSCHARTNOTEFT_GEN_A_CORE
Palliative team reconsulted for GOC.  Pt seen at bedside. Unable to provide history due to poor mentation.  Called patient's spouse/surrogate Silverio Arthur at [phone]. Voice message left and awaiting call back.    For acute issues please page palliative team.    Elizabeth Paz MD  Geriatrics and Palliative Medicine Attending  [facility] pager: [phone] Palliative team reconsulted for GOC.  Pt seen at bedside. Unable to provide history due to poor mentation.  Called patient's spouse/surrogate Silverio Arthur at [phone]. Voice message left and awaiting call back.    For acute issues please page palliative team.    Elizabeth Paz MD  Geriatrics and Palliative Medicine Attending  [facility] pager: [phone]

## 2024-01-09 NOTE — CHART NOTE - NSCHARTNOTEFT_GEN_A_CORE
Nutrition Follow Up Note  Patient seen for: calorie count initiation, nutrition follow-up    Chart reviewed, events noted. This is a 68 year old female with PMH significant for breast cancer s/p L mastectomy, TBI (s/p a fall ~2 years ago) complicated by aphasia, meningioma, hypothyroidism (hx Grave's dz s/p radioactive iodine), CVA, and dysphagia who presents to [facility] ED on 12/20/2023 with no PO intake and increased lethargy in the last 2-3 days, admitted for hypernatremia 166 and UTI. Hospital course complicated by AHRF likely due to to aspiration pneumonitis, pending further GOC conversation for PEG tube placement.       Source: [] Patient       [x] EMR        [x] RN        [] Family at bedside       [] Other:    -If unable to interview patient: [] Trach/Vent/BiPAP  [x] Disoriented/confused/inappropriate to interview    Diet Order:   Diet, Pureed:   Moderately Thick Liquids (MODTHICKLIQS)  Tube Feeding Modality: Nasogastric  Glucerna 1.2 Vidal (GLUCERNARTH)  Total Volume for 24 Hours (mL): 1320  Continuous  Starting Tube Feed Rate {mL per Hour}: 55  Until Goal Tube Feed Rate (mL per Hour): 55  Tube Feed Duration (in Hours): 24  Tube Feed Start Time: 15:15  Carlos(7 Gm Arginine/7 Gm Glut/1.2 Gm HMB     Qty per Day:  2 (01-08-24)    - Is current order appropriate/adequate? [x] Yes  []  No:     - PO intake :   [] >75%  Adequate    [] 50-75%  Fair       [x] <50%  Poor    - Nutrition-related concerns:  -pt observed with tube feeds running at goal rate, no acute GI distress noted.   -seen by Speech Language Pathologist on 1/7, recommended for "Puree/moderately thick liquids; ALL INTAKE VIA TSP; FEED ONLY WHEN AWAKE/ALERT"  -Per GOC, family wishes to proceed with PO diet when patient alert despite aspiration risk, family still deciding on PEG placement   -Calorie count ordered, sheet hung in room, RN made aware. Per discussion with RN, pt mostly lethargic, no PO intake this morning    GI:  Last BM  1/6___.   Bowel Regimen? [x] Yes   [] No      Weights: no new weight to assess   40.8Kg (12/20)-dosing weight     Nutritionally Pertinent MEDICATIONS  (STANDING):  ascorbic acid  folic acid  levothyroxine Injectable  multivitamin  pantoprazole   Suspension  polyethylene glycol 3350  senna  thiamine    Pertinent Labs: 01-09 @ 06:56: Na 136, BUN 11, Cr 0.32<L>, <H>, K+ 4.3, Phos 2.8, Mg 2.2, Alk Phos 72, ALT/SGPT 13, AST/SGOT 11, HbA1c --    A1C with Estimated Average Glucose Result: 5.7 % (09-19-23 @ 10:33)    Finger Sticks:  POCT Blood Glucose.: 126 mg/dL (01-09 @ 06:03)  POCT Blood Glucose.: 108 mg/dL (01-09 @ 00:23)  POCT Blood Glucose.: 133 mg/dL (01-08 @ 17:47)  POCT Blood Glucose.: 131 mg/dL (01-08 @ 12:03)      Skin per nursing documentation:   Per wound care note 1/4  Sacral/ Buttocks / right hip stage 4 pressure injury  left hip/spine  unstageable pressure injury  Lt Shoulder DTI resolving  Incontinence Associated Dermatitis  Lt foot DTI    Edema: none     Estimated Needs:   [x] no change since previous assessment  [] recalculated:   Estimated Needs: based on IBW 52.1 kg  Energy: 0599-2978 kcal/day (30-35 kcal/kg)  Protein: 62-83 g Pro/day (1.2-1.6 g Pro/kg)  Fluid: defer to MD team    Previous Nutrition Diagnosis:   1) Underweight; Malnourished   2) Increased protein-energy needs   Nutrition Diagnosis is: ongoing --addressed with NGT feeds, PO diet, tube feeds       New Nutrition Diagnosis: [x] Not applicable    Nutrition Care Plan:  [x] In Progress  [] Achieved  [] Not applicable    Nutrition Interventions:     Education Provided:       [] Yes:  [x] No:        Recommendations:         [x] Defer diet texture/consistency to team/ Speech Language Pathologist. Calorie Count initiated, RD to follow-up with results     [x] Continue Glucerna 1.2 @ goal rate of 55 mL/hr x 24hrs. Provides 1584 kcal, 79.2 g Pro, 1062.6 mL water in 1320 mL total volume. Monitor tolerance to regimen, adequacy of PO intake and need for tube feed regimen adjustment      [x] Continue current micronutrient supplementation: multivitamin, ascorbic acid. Continue Carlos BID for wound healing     [x] Monitor GOC    Monitoring and Evaluation:   Continue to monitor nutritional intake, tolerance to diet prescription, weights, labs, skin integrity      RD remains available upon request and will follow up per protocol  Madelaine Terry RD, CDN, Available on Teams Nutrition Follow Up Note  Patient seen for: calorie count initiation, nutrition follow-up    Chart reviewed, events noted. This is a 68 year old female with PMH significant for breast cancer s/p L mastectomy, TBI (s/p a fall ~2 years ago) complicated by aphasia, meningioma, hypothyroidism (hx Grave's dz s/p radioactive iodine), CVA, and dysphagia who presents to [facility] ED on 12/20/2023 with no PO intake and increased lethargy in the last 2-3 days, admitted for hypernatremia 166 and UTI. Hospital course complicated by AHRF likely due to to aspiration pneumonitis, pending further GOC conversation for PEG tube placement.       Source: [] Patient       [x] EMR        [x] RN        [] Family at bedside       [] Other:    -If unable to interview patient: [] Trach/Vent/BiPAP  [x] Disoriented/confused/inappropriate to interview    Diet Order:   Diet, Pureed:   Moderately Thick Liquids (MODTHICKLIQS)  Tube Feeding Modality: Nasogastric  Glucerna 1.2 Vidal (GLUCERNARTH)  Total Volume for 24 Hours (mL): 1320  Continuous  Starting Tube Feed Rate {mL per Hour}: 55  Until Goal Tube Feed Rate (mL per Hour): 55  Tube Feed Duration (in Hours): 24  Tube Feed Start Time: 15:15  Carlos(7 Gm Arginine/7 Gm Glut/1.2 Gm HMB     Qty per Day:  2 (01-08-24)    - Is current order appropriate/adequate? [x] Yes  []  No:     - PO intake :   [] >75%  Adequate    [] 50-75%  Fair       [x] <50%  Poor    - Nutrition-related concerns:  -pt observed with tube feeds running at goal rate, no acute GI distress noted.   -seen by Speech Language Pathologist on 1/7, recommended for "Puree/moderately thick liquids; ALL INTAKE VIA TSP; FEED ONLY WHEN AWAKE/ALERT"  -Per GOC, family wishes to proceed with PO diet when patient alert despite aspiration risk, family still deciding on PEG placement   -Calorie count ordered, sheet hung in room, RN made aware. Per discussion with RN, pt mostly lethargic, no PO intake this morning    GI:  Last BM  1/6___.   Bowel Regimen? [x] Yes   [] No      Weights: no new weight to assess   40.8Kg (12/20)-dosing weight     Nutritionally Pertinent MEDICATIONS  (STANDING):  ascorbic acid  folic acid  levothyroxine Injectable  multivitamin  pantoprazole   Suspension  polyethylene glycol 3350  senna  thiamine    Pertinent Labs: 01-09 @ 06:56: Na 136, BUN 11, Cr 0.32<L>, <H>, K+ 4.3, Phos 2.8, Mg 2.2, Alk Phos 72, ALT/SGPT 13, AST/SGOT 11, HbA1c --    A1C with Estimated Average Glucose Result: 5.7 % (09-19-23 @ 10:33)    Finger Sticks:  POCT Blood Glucose.: 126 mg/dL (01-09 @ 06:03)  POCT Blood Glucose.: 108 mg/dL (01-09 @ 00:23)  POCT Blood Glucose.: 133 mg/dL (01-08 @ 17:47)  POCT Blood Glucose.: 131 mg/dL (01-08 @ 12:03)      Skin per nursing documentation:   Per wound care note 1/4  Sacral/ Buttocks / right hip stage 4 pressure injury  left hip/spine  unstageable pressure injury  Lt Shoulder DTI resolving  Incontinence Associated Dermatitis  Lt foot DTI    Edema: none     Estimated Needs:   [x] no change since previous assessment  [] recalculated:   Estimated Needs: based on IBW 52.1 kg  Energy: 5573-1466 kcal/day (30-35 kcal/kg)  Protein: 62-83 g Pro/day (1.2-1.6 g Pro/kg)  Fluid: defer to MD team    Previous Nutrition Diagnosis:   1) Underweight; Malnourished   2) Increased protein-energy needs   Nutrition Diagnosis is: ongoing --addressed with NGT feeds, PO diet, tube feeds       New Nutrition Diagnosis: [x] Not applicable    Nutrition Care Plan:  [x] In Progress  [] Achieved  [] Not applicable    Nutrition Interventions:     Education Provided:       [] Yes:  [x] No:        Recommendations:         [x] Defer diet texture/consistency to team/ Speech Language Pathologist. Calorie Count initiated, RD to follow-up with results     [x] Continue Glucerna 1.2 @ goal rate of 55 mL/hr x 24hrs. Provides 1584 kcal, 79.2 g Pro, 1062.6 mL water in 1320 mL total volume. Monitor tolerance to regimen, adequacy of PO intake and need for tube feed regimen adjustment      [x] Continue current micronutrient supplementation: multivitamin, ascorbic acid. Continue Carlos BID for wound healing     [x] Monitor GOC    Monitoring and Evaluation:   Continue to monitor nutritional intake, tolerance to diet prescription, weights, labs, skin integrity      RD remains available upon request and will follow up per protocol  Madelaine Terry RD, CDN, Available on Teams

## 2024-01-09 NOTE — PROGRESS NOTE ADULT - NUTRITIONAL ASSESSMENT
This patient has been assessed with a concern for Malnutrition and has been determined to have a diagnosis/diagnoses of Severe protein-calorie malnutrition and Underweight (BMI < 19).    This patient is being managed with:   Diet Pureed-  Moderately Thick Liquids (MODTHICKLIQS)  Tube Feeding Modality: Nasogastric  Glucerna 1.2 Vidal (GLUCERNARTH)  Total Volume for 24 Hours (mL): 1320  Continuous  Starting Tube Feed Rate {mL per Hour}: 55  Until Goal Tube Feed Rate (mL per Hour): 55  Tube Feed Duration (in Hours): 24  Tube Feed Start Time: 15:15  Carlos(7 Gm Arginine/7 Gm Glut/1.2 Gm HMB     Qty per Day:  2  Entered: Jan 8 2024  4:24PM

## 2024-01-09 NOTE — PROGRESS NOTE ADULT - PROBLEM SELECTOR PLAN 3
Hx of recurrent UTI (last admission 9/2023). Recent urine culture showing 100k Ecoli. S/p IV ceftriaxone (12/21-23). Febrile overnight 12/27, repeat U/A positive. Restarted on ceftriaxone (12/28-29) but escalated in setting of ongoing aspiration pneumonia.  S/p Zosyn (12/29-1/2).  Resolved

## 2024-01-09 NOTE — PROGRESS NOTE ADULT - PROBLEM SELECTOR PLAN 2
RRT on 12/28 for hypoxia, currently on HFNC. Suspect likely aspiration pneumonitis. CXR negative for focal consolidations.  S/p Zosyn (12/29-1/2).  Resolved, on RA.

## 2024-01-09 NOTE — PROGRESS NOTE ADULT - ATTENDING COMMENTS
68F with PMH significant for breast cancer s/p L mastectomy, TBI (s/p a fall ~2 years ago) c/b aphasia, meningioma, hypothyroidism (hx Grave's dz s/p radioactive iodine), CVA, and dysphagia who presents to [facility] ED on 12/20/2023 with no PO intake and increased lethargy.  Patient was admitted for hypernatremia 166 and UTI. Hospital course complicated by AHRF likely 2/2 to aspiration pneumonitis, pending further GOC  with HCP (  )     # Acute hypoxic respiratory failure secondary to likely aspiration     S/P  5 days  of Zosyn.  SPO2 stable      cont to monitor      Mild leucocytosis with no fever --> will repeat in AM   #  Dysphagia of unclear etiology with severe malnutrition      Feeding  via NGT      Pt is not alert enough for oral feeding      Unsure if oral intake will be enough to satisfy caloric needs      Will need to continue GOC with family   # Hypernatremia- resolved so far    CW free water boluses via NGT ( 300ml every 4 hours )   # Functional quadriplegia with multiple pressure ulcers- wound care following      cont to monitor      Dietitian is on the case   #  Hypothyroidism   Continue levothyroxine IV 56 mcg QD so far   Prognosis guarded- remains full code.     case is discussed with the resident for Team 5     Yesenia Brown   Hospitalist   available on TEAMS 68F with PMH significant for breast cancer s/p L mastectomy, TBI (s/p a fall ~2 years ago) c/b aphasia, meningioma, hypothyroidism (hx Grave's dz s/p radioactive iodine), CVA, and dysphagia who presents to [facility] ED on 12/20/2023 with no PO intake and increased lethargy.  Patient was admitted for hypernatremia 166 and UTI. Hospital course complicated by AHRF likely 2/2 to aspiration pneumonitis, pending further GOC  with HCP (  )     # Acute hypoxic respiratory failure secondary to likely aspiration     S/P  5 days  of Zosyn.  SPO2 stable      cont to monitor      Mild leucocytosis with no fever --> will repeat in AM   #  Dysphagia of unclear etiology with severe malnutrition      Feeding  via NGT      Pt is not alert enough for oral feeding      Unsure if oral intake will be enough to satisfy caloric needs      Will need to continue GOC with family   # Hypernatremia- resolved so far    CW free water boluses via NGT ( 300ml every 4 hours )   # Functional quadriplegia with multiple pressure ulcers- wound care following      cont to monitor      Dietitian is on the case   #  Hypothyroidism   Continue levothyroxine IV 56 mcg QD so far   Prognosis guarded- remains full code.     case is discussed with the resident for Team 5     Yesenia Brown   Hospitalist   available on TEAMS

## 2024-01-09 NOTE — PROGRESS NOTE ADULT - SUBJECTIVE AND OBJECTIVE BOX
***************************************************************  Bassem Hernandez, PG1  Internal Medicine   Pager:  TEAMS  ***************************************************************    JONATHAN YOUNGER  68y  MRN: [id number]    Patient is a 68y old  Female who presents with a chief complaint of No PO intake, lethargy (09 Jan 2024 06:52)    Interval/Overnight Events: no events ON.  Did not attempt PO feeding    Subjective: Pt seen and examined at bedside.  Tolerating tube feeds.  Awakens to voice, squeezes fingers and moves tongue on command.  Tries to voice her name, but unsuccessful     MEDICATIONS  (STANDING):  ascorbic acid 500 milliGRAM(s) Oral daily  chlorhexidine 2% Cloths 1 Application(s) Topical daily  collagenase Ointment 1 Application(s) Topical daily  enoxaparin Injectable 40 milliGRAM(s) SubCutaneous every 24 hours  folic acid 1 milliGRAM(s) Oral daily  levothyroxine Injectable 56 MICROGram(s) IV Push at bedtime  multivitamin 1 Tablet(s) Oral daily  pantoprazole   Suspension 40 milliGRAM(s) Oral daily  polyethylene glycol 3350 17 Gram(s) Oral daily  senna 2 Tablet(s) Oral at bedtime  sertraline 25 milliGRAM(s) Oral daily  thiamine 100 milliGRAM(s) Oral daily    MEDICATIONS  (PRN):      Objective:    Vitals: Vital Signs Last 24 Hrs  T(C): 36.8 (01-09-24 @ 04:59), Max: 36.8 (01-09-24 @ 04:59)  T(F): 98.3 (01-09-24 @ 04:59), Max: 98.3 (01-09-24 @ 04:59)  HR: 74 (01-09-24 @ 04:59) (70 - 85)  BP: 108/63 (01-09-24 @ 04:59) (98/63 - 111/71)  BP(mean): --  RR: 18 (01-09-24 @ 04:59) (18 - 18)  SpO2: 94% (01-09-24 @ 04:59) (94% - 97%)                I&O's Summary      PHYSICAL EXAM:  GENERAL: NAD, cachetic, chronically ill appearing   HEAD:  Atraumatic, Normocephalic  EYES: PERRL, conjunctiva and sclera clear  ENMT: dry mucous membranes   NECK: Supple  CHEST/LUNG: Clear to auscultation bilaterally; No rales, rhonchi, wheezing, or rubs but difficult to assess due to contracted habitus  HEART: Regular rate and rhythm; No murmurs, rubs, or gallops  ABDOMEN: Soft, Nontender, Nondistended; Bowel sounds present  EXTREMITIES:  no clubbing, cyanosis, or edema   SKIN: No rashes or lesions  NERVOUS SYSTEM:  Alert, opens eyes to voice, contracted extremities, moves tongue and squeezes fingers on command  PSYCH: Unable to assess    LABS:                        9.7    11.10 )-----------( 350      ( 09 Jan 2024 06:56 )             30.1                         9.0    9.01  )-----------( 334      ( 08 Jan 2024 07:18 )             29.0                         9.1    9.46  )-----------( 357      ( 07 Jan 2024 07:23 )             28.7     01-09    136  |  101  |  11  ----------------------------<  126<H>  4.3   |  26  |  0.32<L>  01-08    138  |  103  |  10  ----------------------------<  125<H>  4.3   |  25  |  0.31<L>  01-07    138  |  105  |  8   ----------------------------<  110<H>  4.0   |  24  |  0.30<L>    Ca    8.4      09 Jan 2024 06:56  Ca    8.2<L>      08 Jan 2024 07:13  Ca    8.0<L>      07 Jan 2024 07:21  Phos  2.8     01-09  Mg     2.2     01-09    TPro  6.2  /  Alb  3.0<L>  /  TBili  0.4  /  DBili  x   /  AST  11  /  ALT  13  /  AlkPhos  72  01-09  TPro  5.9<L>  /  Alb  2.9<L>  /  TBili  0.3  /  DBili  x   /  AST  11  /  ALT  13  /  AlkPhos  74  01-08  TPro  5.6<L>  /  Alb  2.7<L>  /  TBili  0.3  /  DBili  x   /  AST  14  /  ALT  15  /  AlkPhos  76  01-07    CAPILLARY BLOOD GLUCOSE      POCT Blood Glucose.: 126 mg/dL (09 Jan 2024 06:03)  POCT Blood Glucose.: 108 mg/dL (09 Jan 2024 00:23)  POCT Blood Glucose.: 133 mg/dL (08 Jan 2024 17:47)  POCT Blood Glucose.: 131 mg/dL (08 Jan 2024 12:03)        Urinalysis Basic - ( 09 Jan 2024 06:56 )    Color: x / Appearance: x / SG: x / pH: x  Gluc: 126 mg/dL / Ketone: x  / Bili: x / Urobili: x   Blood: x / Protein: x / Nitrite: x   Leuk Esterase: x / RBC: x / WBC x   Sq Epi: x / Non Sq Epi: x / Bacteria: x          RADIOLOGY & ADDITIONAL TESTS:    Imaging Personally Reviewed:  [x ] YES  [ ] NO    Consultants involved in case:   Consultant(s) Notes Reviewed:  [ x] YES  [ ] NO:   Care Discussed with Consultants/Other Providers [x ] YES  [ ] NO         ***************************************************************  Bassem Hernandez, PG1  Internal Medicine   Pager:  TEAMS  ***************************************************************    JONATHAN YOUNGER  68y  MRN: [id number]    Patient is a 68y old  Female who presents with a chief complaint of No PO intake, lethargy (09 Jan 2024 06:52)    Interval/Overnight Events: no events ON.  Did not attempt PO feeding    Subjective: Pt seen and examined at bedside.  Tolerating tube feeds.  Awakens to voice, squeezes fingers and moves tongue on command.  Tries to voice her name, but unsuccessful     MEDICATIONS  (STANDING):  ascorbic acid 500 milliGRAM(s) Oral daily  chlorhexidine 2% Cloths 1 Application(s) Topical daily  collagenase Ointment 1 Application(s) Topical daily  enoxaparin Injectable 40 milliGRAM(s) SubCutaneous every 24 hours  folic acid 1 milliGRAM(s) Oral daily  levothyroxine Injectable 56 MICROGram(s) IV Push at bedtime  multivitamin 1 Tablet(s) Oral daily  pantoprazole   Suspension 40 milliGRAM(s) Oral daily  polyethylene glycol 3350 17 Gram(s) Oral daily  senna 2 Tablet(s) Oral at bedtime  sertraline 25 milliGRAM(s) Oral daily  thiamine 100 milliGRAM(s) Oral daily    MEDICATIONS  (PRN):      Objective:    Vitals: Vital Signs Last 24 Hrs  T(C): 36.8 (01-09-24 @ 04:59), Max: 36.8 (01-09-24 @ 04:59)  T(F): 98.3 (01-09-24 @ 04:59), Max: 98.3 (01-09-24 @ 04:59)  HR: 74 (01-09-24 @ 04:59) (70 - 85)  BP: 108/63 (01-09-24 @ 04:59) (98/63 - 111/71)  BP(mean): --  RR: 18 (01-09-24 @ 04:59) (18 - 18)  SpO2: 94% (01-09-24 @ 04:59) (94% - 97%)                I&O's Summary      PHYSICAL EXAM:  GENERAL: NAD, cachetic, chronically ill appearing   HEAD:  Atraumatic, Normocephalic  EYES: PERRL, conjunctiva and sclera clear  ENMT: dry mucous membranes   NECK: Supple  CHEST/LUNG: Clear to auscultation bilaterally; No rales, rhonchi, wheezing, or rubs but difficult to assess due to contracted habitus  HEART: Regular rate and rhythm; No murmurs, rubs, or gallops  ABDOMEN: Soft, Nontender, Nondistended; Bowel sounds present  EXTREMITIES:  no clubbing, cyanosis, or edema   SKIN: No rashes or lesions  NERVOUS SYSTEM:  Alert, opens eyes to voice, contracted extremities, moves tongue and squeezes fingers on command  PSYCH: Unable to assess    LABS:                        9.7    11.10 )-----------( 350      ( 09 Jan 2024 06:56 )             30.1                         9.0    9.01  )-----------( 334      ( 08 Jan 2024 07:18 )             29.0                         9.1    9.46  )-----------( 357      ( 07 Jan 2024 07:23 )             28.7     01-09    136  |  101  |  11  ----------------------------<  126<H>  4.3   |  26  |  0.32<L>  01-08    138  |  103  |  10  ----------------------------<  125<H>  4.3   |  25  |  0.31<L>  01-07    138  |  105  |  8   ----------------------------<  110<H>  4.0   |  24  |  0.30<L>    Ca    8.4      09 Jan 2024 06:56  Ca    8.2<L>      08 Jan 2024 07:13  Ca    8.0<L>      07 Jan 2024 07:21  Phos  2.8     01-09  Mg     2.2     01-09    TPro  6.2  /  Alb  3.0<L>  /  TBili  0.4  /  DBili  x   /  AST  11  /  ALT  13  /  AlkPhos  72  01-09  TPro  5.9<L>  /  Alb  2.9<L>  /  TBili  0.3  /  DBili  x   /  AST  11  /  ALT  13  /  AlkPhos  74  01-08  TPro  5.6<L>  /  Alb  2.7<L>  /  TBili  0.3  /  DBili  x   /  AST  14  /  ALT  15  /  AlkPhos  76  01-07    CAPILLARY BLOOD GLUCOSE      POCT Blood Glucose.: 126 mg/dL (09 Jan 2024 06:03)  POCT Blood Glucose.: 108 mg/dL (09 Jan 2024 00:23)  POCT Blood Glucose.: 133 mg/dL (08 Jan 2024 17:47)  POCT Blood Glucose.: 131 mg/dL (08 Jan 2024 12:03)        Urinalysis Basic - ( 09 Jan 2024 06:56 )    Color: x / Appearance: x / SG: x / pH: x  Gluc: 126 mg/dL / Ketone: x  / Bili: x / Urobili: x   Blood: x / Protein: x / Nitrite: x   Leuk Esterase: x / RBC: x / WBC x   Sq Epi: x / Non Sq Epi: x / Bacteria: x          RADIOLOGY & ADDITIONAL TESTS:    Imaging Personally Reviewed:  [x ] YES  [ ] NO    Consultants involved in case:   Consultant(s) Notes Reviewed:  [ x] YES  [ ] NO:   Care Discussed with Consultants/Other Providers [x ] YES  [ ] NO

## 2024-01-09 NOTE — PROGRESS NOTE ADULT - PROBLEM SELECTOR PLAN 4
Patient is anemic chronically, with recent Hgb drop.  CT C/A/P with no signs of active bleeding or hematoma  .Hgb stable

## 2024-01-09 NOTE — PROGRESS NOTE ADULT - ASSESSMENT
68F with PMH significant for breast cancer s/p L mastectomy, TBI (s/p a fall ~2 years ago) c/b aphasia, meningioma, hypothyroidism (hx Grave's dz s/p radioactive iodine), CVA, and dysphagia who presents to [facility] ED on 12/20/2023 with no PO intake and increased lethargy in the last 2-3 days, admitted for hypernatremia 166 and UTI. Hospital course complicated by AHRF likely 2/2 to aspiration pneumonitis, pending further GOC conversation for PEG tube placement.    68F with PMH significant for breast cancer s/p L mastectomy, TBI (s/p a fall ~2 years ago) c/b aphasia, meningioma, hypothyroidism (hx Grave's dz s/p radioactive iodine), CVA, and dysphagia who presents to [facility] ED on 12/20/2023 with no PO intake and increased lethargy in the last 2-3 days, admitted for hypernatremia 166 and UTI. Hospital course complicated by AHRF likely 2/2 to aspiration pneumonitis, pending further GOC conversation for PEG tube placement.

## 2024-01-09 NOTE — CHART NOTE - NSCHARTNOTEFT_GEN_A_CORE
Order received for FEES. Pt is currently tolerating a PO diet of puree/moderately thick liquids via tsp, however, for possible PEG placement (for caloric needs). Per d/w MD Bassem Hernandez family would like to pursue further work-up for dysphagia. FEES exam tentatively scheduled for 1/10. MD Hernandez is aware and in agreement w/ current plan    Antonietta Estevez MA, CCC-SLP  ext [phone] or TEAMS as needed

## 2024-01-09 NOTE — PROGRESS NOTE ADULT - PROBLEM SELECTOR PLAN 10
- DVT ppx: Lovenox  - GI ppx: Lovenox  - Diet: NG tube, hold moderately thickened liquids/pureed diet  - Dispo: pending further GOC discussions

## 2024-01-09 NOTE — PROGRESS NOTE ADULT - PROBLEM SELECTOR PLAN 6
P/w progressively worsening lethargy/encephalopathy i/s/o no PO intake for 2-3 days; at baseline, answers simple questions (Y/N, name) per son. Most likely ddx include metabolic (hypernatremia) vs infection (c/f UTI) vs severe malnutrition vs mixed.  S/p brain MRI 1/4/2024: No recent ischemia, intracranial hemorrhages or enhancing mass lesions.  Currently AOx0, non-verbal, opens eyes to voice, occasionally can follow commands.  - Continue tube feeds  - Continue sertraline

## 2024-01-09 NOTE — PROGRESS NOTE ADULT - PROBLEM SELECTOR PLAN 1
Hx of dysphagia since 2-3 months ago per son. Notable for severe b/l ext contracture and cachexia. Low PO intake at baseline, no PO intake in the last 2-3 days. NGT placed 12/22, subsequent removals and replacements with most recent placed on 1/7.  SLP cleared patient for Puree/moderately thick liquids via TSP amount only only when the patient is awake and alert; however pt unlikely to get adequate calories.  understands risks of aspiration but would like to attempt feeding.  GI consulted for PEG, ongoing GOC on final decision from family.   would like to explore any reversible causes prior to PEG placement.  However discussed w/  worsening PO intake and dysphagia likely 2/2 to progression of disease, unlikely reversible.   would like to try boosting her caloric intake with NGT supplementation to see if that improves her ability to tolerate food.   is open to palliative care consult, as he has difficulty taking care of patient at home.  - Continue tube feeds   - Calorie count  - Palliative care consulted, appreciate recs Hx of dysphagia since 2-3 months ago per son. Notable for severe b/l ext contracture and cachexia. Low PO intake at baseline, no PO intake in the last 2-3 days. NGT placed 12/22, subsequent removals and replacements with most recent placed on 1/7.  SLP cleared patient for Puree/moderately thick liquids via TSP amount only only when the patient is awake and alert; however pt unlikely to get adequate calories.  understands risks of aspiration but would like to attempt feeding.  GI consulted for PEG, ongoing GOC on final decision from family.   would like to explore any reversible causes prior to PEG placement.  However discussed w/  worsening PO intake and dysphagia likely 2/2 to progression of disease, unlikely reversible.   would like to try boosting her caloric intake with NGT supplementation to see if that improves her ability to tolerate food.   is open to palliative care consult, as he has difficulty taking care of patient at home.  - Continue tube feeds   - Calorie count  - Palliative care consulted, appreciate recs  - Will attempt FFEESST if patient tolerates

## 2024-01-10 LAB
GLUCOSE BLDC GLUCOMTR-MCNC: 127 MG/DL — HIGH (ref 70–99)
GLUCOSE BLDC GLUCOMTR-MCNC: 127 MG/DL — HIGH (ref 70–99)
GLUCOSE BLDC GLUCOMTR-MCNC: 152 MG/DL — HIGH (ref 70–99)
GLUCOSE BLDC GLUCOMTR-MCNC: 152 MG/DL — HIGH (ref 70–99)
GLUCOSE BLDC GLUCOMTR-MCNC: 163 MG/DL — HIGH (ref 70–99)
GLUCOSE BLDC GLUCOMTR-MCNC: 163 MG/DL — HIGH (ref 70–99)

## 2024-01-10 PROCEDURE — 99233 SBSQ HOSP IP/OBS HIGH 50: CPT

## 2024-01-10 PROCEDURE — 99233 SBSQ HOSP IP/OBS HIGH 50: CPT | Mod: GC

## 2024-01-10 PROCEDURE — 99497 ADVNCD CARE PLAN 30 MIN: CPT | Mod: 25

## 2024-01-10 RX ADMIN — CHLORHEXIDINE GLUCONATE 1 APPLICATION(S): 213 SOLUTION TOPICAL at 12:45

## 2024-01-10 RX ADMIN — Medication 100 MILLIGRAM(S): at 12:38

## 2024-01-10 RX ADMIN — PANTOPRAZOLE SODIUM 40 MILLIGRAM(S): 20 TABLET, DELAYED RELEASE ORAL at 12:38

## 2024-01-10 RX ADMIN — Medication 56 MICROGRAM(S): at 21:43

## 2024-01-10 RX ADMIN — Medication 1 TABLET(S): at 12:38

## 2024-01-10 RX ADMIN — Medication 500 MILLIGRAM(S): at 12:38

## 2024-01-10 RX ADMIN — ENOXAPARIN SODIUM 40 MILLIGRAM(S): 100 INJECTION SUBCUTANEOUS at 14:20

## 2024-01-10 RX ADMIN — SERTRALINE 25 MILLIGRAM(S): 25 TABLET, FILM COATED ORAL at 12:38

## 2024-01-10 RX ADMIN — SENNA PLUS 2 TABLET(S): 8.6 TABLET ORAL at 21:37

## 2024-01-10 RX ADMIN — Medication 1 MILLIGRAM(S): at 12:38

## 2024-01-10 RX ADMIN — Medication 1 APPLICATION(S): at 14:21

## 2024-01-10 NOTE — PROGRESS NOTE ADULT - ASSESSMENT
68F with PMH significant for breast cancer s/p L mastectomy, TBI (s/p a fall ~2 years ago) c/b aphasia, meningioma, hypothyroidism (hx Grave's dz s/p radioactive iodine), CVA, and dysphagia who presents to [facility] ED on 12/20/2023 with no PO intake and increased lethargy in the last 2-3 days. Palliative consulted for nutritional goals of care given frequent hospitalizations for hypernatremia.  68F with PMH significant for breast cancer s/p L mastectomy, TBI (s/p a fall ~2 years ago) c/b aphasia, meningioma, hypothyroidism (hx Grave's dz s/p radioactive iodine), CVA, and dysphagia who presents to [facility] ED on 12/20/2023 with no PO intake and increased lethargy in the last 2-3 days. Palliative consulted for nutritional goals of care given frequent hospitalizations for hypernatremia.

## 2024-01-10 NOTE — SWALLOW FEES ASSESSMENT ADULT - COMMENTS
IMAGIN/7 CXR: IMPRESSION: No focal consolidation. NG tube tip in distal stomach.    ***Pt is known to this service from prior hospitalization 2023. Recommendations at that time for diet of puree/moderately thick liquids via half tsp. On this admission, Pt seen for initial swallow evaluation . Recommendations at that time for puree/moderately thick liquids via 1/2 tsp w/ NGT for supplemental nutrition and calorie count; Pt later made NPO by team 2/2 oral holding. Maintained on NGT. Repeat evaluation performed  w/ recommendations for NPO and GOC conversation. +NGT  b/l vocal cords mobile

## 2024-01-10 NOTE — PROGRESS NOTE ADULT - CONVERSATION DETAILS
Met with patient's  at bedside. Introduced myself and the role of palliative care. Dr. Arthur shared about the patient's baseline prior to the hospitalization, sharing she was able to ambulate with assistance and make her needs known. He was the primary caregiver at home. Dr. Arthur shared the reason he asked for palliative's input is to help him get his wife out of the hospital. Discussed what his goals are which include continued aggressive medical management. Both myself and the primary team providers at bedside provided a guarded prognosis, that mental status and functional status are likely a result of previous insult and diagnoses and the opportunity for recovery is limited. Discussed the patient is contracted, lethargic, unable to tolerate po. Discussed that artifical nutrition likely will not benefit in the patient's recovery and in fact could lead to further complications including aspiration. Dr. Arthur verbalized understanding but at this time is interested in pursuing a peg and continuing all aggressive medical interventions. Emotional support provided.
Discussed current clinical situation with patiens spouse, as patient lacks decisional capacity.  Introduced self and role, spoke about ongoing dysphagia, even though advanced to pureed diet- risk of aspiration, risk of recurrence of hypernatremia if not able to sufficiently eat and drink.  Silverio requesting f/u with speech therapy and ENT consult, as he wants the dysphagia evaluated further. He states 2 months ago patient functionally doing much better and feels she needs consistent PT, OT and speech therapy. He is working to obtain medicaid so that patient can perhaps have an opportunity for more services.   He does state, however, that in the event she cannot maintain her electrolytes or pass calorie count that he would want to pursue a PEG. He has been consistent with this wish.    I did encourage conversations regarding advance directives given risk of aspiration as well as electrolyte abnormalities. I asked him to consider conversations regarding CPR and mechanical ventilation. He states he wants to have these conversations with patient when she improves, and he feels she is more vocal and functional since admission. At this time, he is not ready to consider completion of a MOLST form.    Primary team updated.
Spoke to  Silverio over the phone. Discussed w/  that NG tube is only a temporary measure to providing nutrition for the patient. Patient is unable to participate in speech/swallow eval at this time. She is at extremely high risk for aspiration. Per , if patient's dysphagia is not reversible, a long-term feeding tube is in line with her wishes.

## 2024-01-10 NOTE — PROGRESS NOTE ADULT - SUBJECTIVE AND OBJECTIVE BOX
***************************************************************  Bassem Hernandez, PG1  Internal Medicine   Pager:  TEAMS  ***************************************************************    JONATHAN YOUNGER  68y  MRN: [id number]    Patient is a 68y old  Female who presents with a chief complaint of No PO intake, lethargy (10 Angel 2024 06:16)      Interval/Overnight Events: Did not attempt PO feeding    Subjective: Pt seen and examined at bedside.  Tolerating tube feeds.  Awakens to voice, squeezes fingers on command.  Tries to voice her name, but unsuccessful.  Spoke with  at bedside with palliative.  Pt's  is amenable to PEG tube if patient is a candidate.  Prior to hospitalization, patient was able to ambulate with assistance.   wants to see if patient will possibly be able to ambulate again eventually or regain some functional status.    MEDICATIONS  (STANDING):  ascorbic acid 500 milliGRAM(s) Oral daily  chlorhexidine 2% Cloths 1 Application(s) Topical daily  collagenase Ointment 1 Application(s) Topical daily  enoxaparin Injectable 40 milliGRAM(s) SubCutaneous every 24 hours  folic acid 1 milliGRAM(s) Oral daily  levothyroxine Injectable 56 MICROGram(s) IV Push at bedtime  multivitamin 1 Tablet(s) Oral daily  pantoprazole   Suspension 40 milliGRAM(s) Oral daily  polyethylene glycol 3350 17 Gram(s) Oral daily  senna 2 Tablet(s) Oral at bedtime  sertraline 25 milliGRAM(s) Oral daily  thiamine 100 milliGRAM(s) Oral daily    MEDICATIONS  (PRN):      Objective:    Vitals: Vital Signs Last 24 Hrs  T(C): 36.5 (01-10-24 @ 09:31), Max: 37.3 (01-10-24 @ 05:30)  T(F): 97.7 (01-10-24 @ 09:31), Max: 99.1 (01-10-24 @ 05:30)  HR: 90 (01-10-24 @ 09:31) (80 - 90)  BP: 97/60 (01-10-24 @ 09:31) (94/60 - 122/68)  BP(mean): --  RR: 16 (01-10-24 @ 09:31) (16 - 18)  SpO2: 97% (01-10-24 @ 09:31) (94% - 97%)                I&O's Summary    09 Jan 2024 07:01  -  10 Angel 2024 07:00  --------------------------------------------------------  IN: 0 mL / OUT: 450 mL / NET: -450 mL        PHYSICAL EXAM:  GENERAL: NAD, cachetic, chronically ill appearing   HEAD:  Atraumatic, Normocephalic  EYES: PERRL, conjunctiva and sclera clear  ENMT: dry mucous membranes   NECK: Supple  CHEST/LUNG: Clear to auscultation bilaterally; No rales, rhonchi, wheezing, or rubs but difficult to assess due to contracted habitus  HEART: Regular rate and rhythm; No murmurs, rubs, or gallops  ABDOMEN: Soft, Nontender, Nondistended; Bowel sounds present  EXTREMITIES:  no clubbing, cyanosis, or edema   SKIN: No rashes or lesions  NERVOUS SYSTEM:  Alert, opens eyes to voice, contracted extremities, squeezes fingers on command  PSYCH: Unable to assess    LABS:                        9.7    11.10 )-----------( 350      ( 09 Jan 2024 06:56 )             30.1                         9.0    9.01  )-----------( 334      ( 08 Jan 2024 07:18 )             29.0     01-09    136  |  101  |  11  ----------------------------<  126<H>  4.3   |  26  |  0.32<L>  01-08    138  |  103  |  10  ----------------------------<  125<H>  4.3   |  25  |  0.31<L>    Ca    8.4      09 Jan 2024 06:56  Ca    8.2<L>      08 Jan 2024 07:13  Phos  2.8     01-09  Mg     2.2     01-09    TPro  6.2  /  Alb  3.0<L>  /  TBili  0.4  /  DBili  x   /  AST  11  /  ALT  13  /  AlkPhos  72  01-09  TPro  5.9<L>  /  Alb  2.9<L>  /  TBili  0.3  /  DBili  x   /  AST  11  /  ALT  13  /  AlkPhos  74  01-08    CAPILLARY BLOOD GLUCOSE      POCT Blood Glucose.: 127 mg/dL (10 Angel 2024 12:21)  POCT Blood Glucose.: 152 mg/dL (10 Angel 2024 00:06)  POCT Blood Glucose.: 132 mg/dL (09 Jan 2024 21:44)        Urinalysis Basic - ( 09 Jan 2024 06:56 )    Color: x / Appearance: x / SG: x / pH: x  Gluc: 126 mg/dL / Ketone: x  / Bili: x / Urobili: x   Blood: x / Protein: x / Nitrite: x   Leuk Esterase: x / RBC: x / WBC x   Sq Epi: x / Non Sq Epi: x / Bacteria: x          RADIOLOGY & ADDITIONAL TESTS:    Imaging Personally Reviewed:  [x ] YES  [ ] NO    Consultants involved in case:   Consultant(s) Notes Reviewed:  [ x] YES  [ ] NO:   Care Discussed with Consultants/Other Providers [x ] YES  [ ] NO         ***************************************************************  Bassem Hernandez, PG1  Internal Medicine   Pager:  TEAMS  ***************************************************************    JONATHAN YOUNGER  68y  MRN: [id number]    Patient is a 68y old  Female who presents with a chief complaint of No PO intake, lethargy (10 Angel 2024 06:16)      Interval/Overnight Events: Did not attempt PO feeding    Subjective: Pt seen and examined at bedside.  Tolerating tube feeds.  Awakens to voice, squeezes fingers on command.  Tries to voice her name, but unsuccessful.  Spoke with  at bedside with palliative.  Pt's  is amenable to PEG tube if patient is a candidate.  Prior to hospitalization, patient was able to ambulate with assistance.   wants to see if patient will possibly be able to ambulate again eventually or regain some functional status.    MEDICATIONS  (STANDING):  ascorbic acid 500 milliGRAM(s) Oral daily  chlorhexidine 2% Cloths 1 Application(s) Topical daily  collagenase Ointment 1 Application(s) Topical daily  enoxaparin Injectable 40 milliGRAM(s) SubCutaneous every 24 hours  folic acid 1 milliGRAM(s) Oral daily  levothyroxine Injectable 56 MICROGram(s) IV Push at bedtime  multivitamin 1 Tablet(s) Oral daily  pantoprazole   Suspension 40 milliGRAM(s) Oral daily  polyethylene glycol 3350 17 Gram(s) Oral daily  senna 2 Tablet(s) Oral at bedtime  sertraline 25 milliGRAM(s) Oral daily  thiamine 100 milliGRAM(s) Oral daily    MEDICATIONS  (PRN):      Objective:    Vitals: Vital Signs Last 24 Hrs  T(C): 36.5 (01-10-24 @ 09:31), Max: 37.3 (01-10-24 @ 05:30)  T(F): 97.7 (01-10-24 @ 09:31), Max: 99.1 (01-10-24 @ 05:30)  HR: 90 (01-10-24 @ 09:31) (80 - 90)  BP: 97/60 (01-10-24 @ 09:31) (94/60 - 122/68)  BP(mean): --  RR: 16 (01-10-24 @ 09:31) (16 - 18)  SpO2: 97% (01-10-24 @ 09:31) (94% - 97%)                I&O's Summary    09 Jan 2024 07:01  -  10 Angel 2024 07:00  --------------------------------------------------------  IN: 0 mL / OUT: 450 mL / NET: -450 mL        PHYSICAL EXAM:  GENERAL: NAD, cachetic, chronically ill appearing   HEAD:  Atraumatic, Normocephalic  EYES: PERRL, conjunctiva and sclera clear  ENMT: dry mucous membranes   NECK: Supple  CHEST/LUNG: Clear to auscultation bilaterally; No rales, rhonchi, wheezing, or rubs but difficult to assess due to contracted habitus  HEART: Regular rate and rhythm; No murmurs, rubs, or gallops  ABDOMEN: Soft, Nontender, Nondistended; Bowel sounds present  EXTREMITIES:  no clubbing, cyanosis, or edema   SKIN: No rashes or lesions  NERVOUS SYSTEM:  Alert, opens eyes to voice, contracted extremities, squeezes fingers on command  PSYCH: Unable to assess    LABS:                        9.7    11.10 )-----------( 350      ( 09 Jan 2024 06:56 )             30.1                         9.0    9.01  )-----------( 334      ( 08 Jan 2024 07:18 )             29.0     01-09    136  |  101  |  11  ----------------------------<  126<H>  4.3   |  26  |  0.32<L>  01-08    138  |  103  |  10  ----------------------------<  125<H>  4.3   |  25  |  0.31<L>    Ca    8.4      09 Jan 2024 06:56  Ca    8.2<L>      08 Jan 2024 07:13  Phos  2.8     01-09  Mg     2.2     01-09    TPro  6.2  /  Alb  3.0<L>  /  TBili  0.4  /  DBili  x   /  AST  11  /  ALT  13  /  AlkPhos  72  01-09  TPro  5.9<L>  /  Alb  2.9<L>  /  TBili  0.3  /  DBili  x   /  AST  11  /  ALT  13  /  AlkPhos  74  01-08    CAPILLARY BLOOD GLUCOSE      POCT Blood Glucose.: 127 mg/dL (10 Angel 2024 12:21)  POCT Blood Glucose.: 152 mg/dL (10 Angel 2024 00:06)  POCT Blood Glucose.: 132 mg/dL (09 Jan 2024 21:44)        Urinalysis Basic - ( 09 Jan 2024 06:56 )    Color: x / Appearance: x / SG: x / pH: x  Gluc: 126 mg/dL / Ketone: x  / Bili: x / Urobili: x   Blood: x / Protein: x / Nitrite: x   Leuk Esterase: x / RBC: x / WBC x   Sq Epi: x / Non Sq Epi: x / Bacteria: x          RADIOLOGY & ADDITIONAL TESTS:    Imaging Personally Reviewed:  [x ] YES  [ ] NO    Consultants involved in case:   Consultant(s) Notes Reviewed:  [ x] YES  [ ] NO:   Care Discussed with Consultants/Other Providers [x ] YES  [ ] NO

## 2024-01-10 NOTE — PROGRESS NOTE ADULT - ATTENDING COMMENTS
Agree with above. Patient not taking in enough calories, PEG is requested. Can attempt endoscopic PEG, there is a possibility of no good window as stomach appears to be obscured at least partially by liver. Agree with above. Patient not taking in enough calories, PEG is requested. Images from recent CT A/P reviewed personally, stomach appears to be behind liver at least partially. Can attempt endoscopic PEG, there is a possibility of no good window as stomach appears to be obscured at least partially by liver.

## 2024-01-10 NOTE — SWALLOW FEES ASSESSMENT ADULT - ORAL PHASE
Prolonged AP transit time; Uncontrolled spillover to the level of the pyriform sinuses; Multiple swallows x2 Prolonged AP transit time; Uncontrolled spillover; Multiple swallows x2

## 2024-01-10 NOTE — SWALLOW FEES ASSESSMENT ADULT - ROSENBEK'S PENETRATION ASPIRATION SCALE
(1) no aspiration, material does not enter airway (5) material contacts vocal cords, visible residue remains (penetration) (2) material enters airway, remains above the vocal cords, no residue remains (penetration) Tsp PAS=5               1/2 tsp PAS=1

## 2024-01-10 NOTE — SWALLOW FEES ASSESSMENT ADULT - DIAGNOSTIC IMPRESSIONS
68y F w/ PMHx of TBI s/p fall (residual aphasia, L-sided weakness, and dysphagia at baseline), CVA, and meningioma presents w/ AMS and poor PO intake. Hospital course c/b significant hypernatremia, UTI, and AHRF likely 2/2 aspiration pneumonitis. Pt presents w/ a significant oropharyngeal dysphagia. The oral stage of swallow is characterized by prolonged AP transit time w/ uncontrolled spillover across consistencies. Pt has notably less control w/ mildly thick liquids which results in deep laryngeal penetration prior to the swallow. The pharyngeal phase of swallow is characterized by deep laryngeal penetration to the level of the vocal cords with moderately thick  and mildly thick liquids during the swallow. Pt is unable to clear material despite cued cough. Airway protection is mildly improved w/ moderately thick liquids when bolus size modification (1/2 tsp) is employed. Although aspiration was not observed on this exam, Pt is at high risk for aspiration given poor positioning, mentation, and overall swallow profile. This service will follow-up pending family decision re: provisions of nutrition/hydration.     Disorders: reduced lingual strength/ROM/Rate of motion, reduced BOT to posterior pharyngeal wall contact, reduced laryngeal closure, reduced supraglottic sensation

## 2024-01-10 NOTE — SWALLOW FEES ASSESSMENT ADULT - SLP PERTINENT HISTORY OF CURRENT PROBLEM
68y F w/ PMHx of TBI s/p fall (residual aphasia, L-sided weakness, and dysphagia at baseline), CVA, meningioma, breast CA, and hypothyroidism presents w/ AMS and poor PO intake. Found to have significant hypernatremia to 166 (checking q6h), mild hypotension (90/60) positive UA with borderline leukocytosis with PMN predominance. Pending culture results. Physical exam notable for encephalopathy AOx0 and non-verbal, cachexia and b/l upper/lower ext contracture, and multiple pressure ulcers of varying stages (I-III). Per palliative care consult, family wishes to pursue further dysphagia work-up. Hospital course complicated by AHRF likely 2/2 to aspiration pneumonitis. Per GI note 1/3, family wishes to defer PEG placement at this time. NGT placed and S&S to be re-consulted when mentation improves.

## 2024-01-10 NOTE — PROGRESS NOTE ADULT - PROBLEM SELECTOR PLAN 5
see GO note above   patient full code  surrogate is patient's  Silverio Younger  goal is to continue aggressive medical intervention, including peg

## 2024-01-10 NOTE — SWALLOW FEES ASSESSMENT ADULT - LARYNGEAL PENETRATION AFTER SWALLOW - SILENT
Previously penetrated material remains along the vocal cords and is penetrated during the swallow; unable to clear material w/ cued cough

## 2024-01-10 NOTE — SWALLOW FEES ASSESSMENT ADULT - RECOMMENDED CONSISTENCY
Given high risk of aspiration, primary recommendation is for NPO w/ alternative means of nutrition/hydration/medication    If pleasure feeds are within GOC, consider conservative diet of puree/moderately thick liquids via 1/2 tsp amounts; FEED ONLY WHEN AWAKE/ALERT

## 2024-01-10 NOTE — PROGRESS NOTE ADULT - ASSESSMENT
68F with PMH significant for breast cancer s/p L mastectomy, TBI (s/p a fall ~2 years ago) c/b aphasia, meningioma, hypothyroidism (hx Grave's dz s/p radioactive iodine), CVA, and dysphagia who presents to [facility] ED on 12/20/2023 with no PO intake and increased lethargy in the last 2-3 days, admitted for hypernatremia 166 and UTI. Hospital course complicated by AHRF likely 2/2 to aspiration pneumonitis, pending further GOC conversation for PEG tube placement.    68F with PMH significant for breast cancer s/p L mastectomy, TBI (s/p a fall ~2 years ago) c/b aphasia, meningioma, hypothyroidism (hx Grave's dz s/p radioactive iodine), CVA, and dysphagia who presents to [facility] ED on 12/20/2023 with no PO intake and increased lethargy in the last 2-3 days, admitted for hypernatremia 166 and UTI. Hospital course complicated by AHRF likely 2/2 to aspiration pneumonitis, pending further GOC conversation for PEG tube placement.    68F with PMH significant for breast cancer s/p L mastectomy, TBI (s/p a fall ~2 years ago) c/b aphasia, meningioma, hypothyroidism (hx Grave's dz s/p radioactive iodine), CVA, and dysphagia who presents to [facility] ED on 12/20/2023 with no PO intake and increased lethargy in the last 2-3 days, admitted for hypernatremia 166 and UTI. Hospital course complicated by AHRF likely 2/2 to aspiration pneumonitis, pending PEG evaluation   68F with PMH significant for breast cancer s/p L mastectomy, TBI (s/p a fall ~2 years ago) c/b aphasia, meningioma, hypothyroidism (hx Grave's dz s/p radioactive iodine), CVA, and dysphagia who presents to [facility] ED on 12/20/2023 with no PO intake and increased lethargy in the last 2-3 days, admitted for hypernatremia 166 and UTI. Hospital course complicated by AHRF likely 2/2 to aspiration pneumonitis, pending PEG evaluation

## 2024-01-10 NOTE — PROGRESS NOTE ADULT - SUBJECTIVE AND OBJECTIVE BOX
SUBJECTIVE AND OBJECTIVE: Pt seen and examined at bedside. Pt unable to participate in exam/GOC conversation  Indication for Geriatrics and Palliative Care Services/INTERVAL HPI: GOC    OVERNIGHT EVENTS: No acute events o/n     DNR on chart:  Allergies    Tapazole (Hives)    Intolerances    MEDICATIONS  (STANDING):  ascorbic acid 500 milliGRAM(s) Oral daily  chlorhexidine 2% Cloths 1 Application(s) Topical daily  collagenase Ointment 1 Application(s) Topical daily  enoxaparin Injectable 40 milliGRAM(s) SubCutaneous every 24 hours  folic acid 1 milliGRAM(s) Oral daily  levothyroxine Injectable 56 MICROGram(s) IV Push at bedtime  multivitamin 1 Tablet(s) Oral daily  pantoprazole   Suspension 40 milliGRAM(s) Oral daily  polyethylene glycol 3350 17 Gram(s) Oral daily  senna 2 Tablet(s) Oral at bedtime  sertraline 25 milliGRAM(s) Oral daily  thiamine 100 milliGRAM(s) Oral daily    MEDICATIONS  (PRN):      ITEMS UNCHECKED ARE NOT PRESENT    PRESENT SYMPTOMS: [x ]Unable to self-report - see [ ] CPOT [ x] PAINADS [ x] RDOS  Source if other than patient:  [ ]Family   [x ]Team     Pain:  [ ]yes [ ]no  QOL impact -   Location -                    Aggravating factors -  Quality -  Radiation -  Timing-  Severity (0-10 scale):  Minimal acceptable level (0-10 scale):     CPOT:    https://www.[facility].org/getattachment/[email or web address]/Critical-Care-Pain-Observation-Tool-(CPOT)    PAINAD Score: See PAINAD tool and score below       Dyspnea:                           [ ]Mild [ ]Moderate [ ]Severe    RDOS: See RDOS tool and score below   0 to 2  minimal or no respiratory distress   3  mild distress  4 to 6 moderate distress  >7 severe distress      Anxiety:                             [ ]Mild [ ]Moderate [ ]Severe  Fatigue:                             [ ]Mild [ ]Moderate [ ]Severe  Nausea:                             [ ]Mild [ ]Moderate [ ]Severe  Loss of appetite:              [ ]Mild [ ]Moderate [ ]Severe  Constipation:                    [ ]Mild [ ]Moderate [ ]Severe    PCSSQ[Palliative Care Spiritual Screening Question]   Severity (0-10):  Score of 4 or > indicate consideration of Chaplaincy referral.  Chaplaincy Referral: [ ] yes [ ] refused [ ] following [ ] Deferred     Caregiver [place]? : [ ] yes [ ] no [ ] Deferred [ ] Declined             Social work referral [ ] Patient & Family Centered Care Referral [ ]     Anticipatory Grief present?:  [ ] yes [ ] no  [ ] Deferred                  Social work referral [ ] Chaplaincy Referral [ ]    		  Other Symptoms:  [x ]All other review of systems negative- unable to participate in exam     Palliative Performance Status Version 2:   See PPSv2 tool and score below         PHYSICAL EXAM:  Vital Signs Last 24 Hrs  T(C): 37 (10 Angel 2024 14:16), Max: 37.3 (10 Angel 2024 05:30)  T(F): 98.6 (10 Angel 2024 14:16), Max: 99.1 (10 Angel 2024 05:30)  HR: 84 (10 Angel 2024 14:16) (80 - 90)  BP: 91/65 (10 Angel 2024 14:16) (91/65 - 122/68)  BP(mean): --  RR: 16 (10 Angel 2024 14:16) (16 - 18)  SpO2: 95% (10 Angel 2024 14:16) (94% - 97%)    Parameters below as of 10 Angel 2024 14:16  Patient On (Oxygen Delivery Method): room air     I&O's Summary    09 Jan 2024 07:01  -  10 Angel 2024 07:00  --------------------------------------------------------  IN: 0 mL / OUT: 450 mL / NET: -450 mL       GENERAL: [ ]Cachexia    [x ]Alert  [ ]Oriented x   [ ]Lethargic  [ ]Unarousable  [ ]Verbal  [x ]Non-Verbal  Behavioral:   [ ]Anxiety  [ ]Delirium [ ]Agitation [ ]Other  HEENT:  [ ]Normal   [ x]Dry mouth   [ ]ET Tube/Trach  [ ]Oral lesions  PULMONARY:   [ ]Clear [ ]Tachypnea  [ ]Audible excessive secretions   [ ]Rhonchi        [ ]Right [ ]Left [ ]Bilateral  [ ]Crackles        [ ]Right [ ]Left [ ]Bilateral  [ ]Wheezing     [ ]Right [ ]Left [ ]Bilateral  [x ]Diminished BS [ ] Right [ ]Left [ x]Bilateral  CARDIOVASCULAR:    [x ]Regular [ ]Irregular [ ]Tachy  [ ]James [ ]Murmur [ ]Other  GASTROINTESTINAL:  [x]Soft  [ ]Distended   [x]+BS  [x ]Non tender [ ]Tender  [ ]Other [ ]PEG [ x]OGT/ NGT   Last BM:   GENITOURINARY:  [ ]Normal [ x]Incontinent   [ ]Oliguria/Anuria   [ ]Spangler  MUSCULOSKELETAL:   [ ]Normal   [x ]Weakness  [x ]Bed/Wheelchair bound [ ]Edema Contracted   NEUROLOGIC:   [ ]No focal deficits  [x ] Cognitive impairment  [ x] Dysphagia [ ]Dysarthria [ ] Paresis [ ]Other   SKIN:   [ ]Normal  [ ]Rash  [ ]Other  [ ]Pressure ulcer(s) [ ]y [ ]n present on admission    CRITICAL CARE:  [ ]Shock Present  [ ]Septic [ ]Cardiogenic [ ]Neurologic [ ]Hypovolemic  [ ]Vasopressors [ ]Inotropes  [ ]Respiratory failure present [ ]Mechanical Ventilation [ ]Non-invasive ventilatory support [ ]High-Flow   [ ]Acute  [ ]Chronic [ ]Hypoxic  [ ]Hypercarbic [ ]Other  [ ]Other organ failure     LABS:                        9.7    11.10 )-----------( 350      ( 09 Jan 2024 06:56 )             30.1   01-09    136  |  101  |  11  ----------------------------<  126<H>  4.3   |  26  |  0.32<L>    Ca    8.4      09 Jan 2024 06:56  Phos  2.8     01-09  Mg     2.2     01-09    TPro  6.2  /  Alb  3.0<L>  /  TBili  0.4  /  DBili  x   /  AST  11  /  ALT  13  /  AlkPhos  72  01-09      Urinalysis Basic - ( 09 Jan 2024 06:56 )    Color: x / Appearance: x / SG: x / pH: x  Gluc: 126 mg/dL / Ketone: x  / Bili: x / Urobili: x   Blood: x / Protein: x / Nitrite: x   Leuk Esterase: x / RBC: x / WBC x   Sq Epi: x / Non Sq Epi: x / Bacteria: x      RADIOLOGY & ADDITIONAL STUDIES:  < from: Xray Chest 1 View- PORTABLE-Urgent (Xray Chest 1 View- PORTABLE-Urgent .) (01.08.24 @ 01:18) >    ACC: [id number] EXAM:  XR CHEST PORTABLE URGENT 1V   ORDERED BY:  DAVION JAMESON     PROCEDURE DATE:  01/08/2024          INTERPRETATION:  EXAMINATION: XR CHEST URGENT    CLINICAL INDICATION: ng tube placement    TECHNIQUE: Single frontal, portable view of the chest was obtained.    COMPARISON: Chest x-ray 1/7/2024.    FINDINGS:  Enteric tube with tip overlying distal esophagus.  Left thoracic surgical clips.  The heart is not accurately assessed in this AP projection.  Hazy opacity right lung base, likely small effusion with atelectasis.   Left lung is clear.  There is no pneumothorax.  No acute bony abnormality.    IMPRESSION:  Enteric tube with tip overlying distal esophagus.    --- End of Report ---           ROSELIA MOCTEZUMA MD; Resident Radiologist  This document has been electronically signed.  BRIAN OQUENDO MD; Attending Radiologist  This document has been electronically signed. Jan 8 2024  1:32PM    < end of copied text >    Protein Calorie Malnutrition Present: [ ]mild [ ]moderate [ ]severe [ ]underweight [ ]morbid obesity  https://www.andeal.org/vault/2440/web/files/ONC/Table_Clinical%20Characteristics%20to%20Document%20Malnutrition-White%20JV%20et%20al%409740.pdf    Height (cm): 160 (12-20-23 @ 10:46), 167.6 (09-18-23 @ 20:11), 165.1 (03-17-23 @ 08:17)  Weight (kg): 40.8 (12-20-23 @ 10:46), 49.9 (09-18-23 @ 20:11), 54.4 (03-17-23 @ 08:17)  BMI (kg/m2): 15.9 (12-20-23 @ 10:46), 17.8 (09-18-23 @ 20:11), 20 (03-17-23 @ 08:17)    [ x]PPSV2 < or = 30%  [ ]significant weight loss [ ]poor nutritional intake [ ]anasarca[ ]Artificial Nutrition    Other REFERRALS:  [ ]Hospice  [ ]Child Life  [ ]Social Work  [ ]Case management [ ]Holistic Therapy     Goals of Care Document: SUBJECTIVE AND OBJECTIVE: Pt seen and examined at bedside. Pt unable to participate in exam/GOC conversation  Indication for Geriatrics and Palliative Care Services/INTERVAL HPI: GOC    OVERNIGHT EVENTS: No acute events o/n     DNR on chart:  Allergies    Tapazole (Hives)    Intolerances    MEDICATIONS  (STANDING):  ascorbic acid 500 milliGRAM(s) Oral daily  chlorhexidine 2% Cloths 1 Application(s) Topical daily  collagenase Ointment 1 Application(s) Topical daily  enoxaparin Injectable 40 milliGRAM(s) SubCutaneous every 24 hours  folic acid 1 milliGRAM(s) Oral daily  levothyroxine Injectable 56 MICROGram(s) IV Push at bedtime  multivitamin 1 Tablet(s) Oral daily  pantoprazole   Suspension 40 milliGRAM(s) Oral daily  polyethylene glycol 3350 17 Gram(s) Oral daily  senna 2 Tablet(s) Oral at bedtime  sertraline 25 milliGRAM(s) Oral daily  thiamine 100 milliGRAM(s) Oral daily    MEDICATIONS  (PRN):      ITEMS UNCHECKED ARE NOT PRESENT    PRESENT SYMPTOMS: [x ]Unable to self-report - see [ ] CPOT [ x] PAINADS [ x] RDOS  Source if other than patient:  [ ]Family   [x ]Team     Pain:  [ ]yes [ ]no  QOL impact -   Location -                    Aggravating factors -  Quality -  Radiation -  Timing-  Severity (0-10 scale):  Minimal acceptable level (0-10 scale):     CPOT:    https://www.[facility].org/getattachment/[email or web address]/Critical-Care-Pain-Observation-Tool-(CPOT)    PAINAD Score: See PAINAD tool and score below       Dyspnea:                           [ ]Mild [ ]Moderate [ ]Severe    RDOS: See RDOS tool and score below   0 to 2  minimal or no respiratory distress   3  mild distress  4 to 6 moderate distress  >7 severe distress      Anxiety:                             [ ]Mild [ ]Moderate [ ]Severe  Fatigue:                             [ ]Mild [ ]Moderate [ ]Severe  Nausea:                             [ ]Mild [ ]Moderate [ ]Severe  Loss of appetite:              [ ]Mild [ ]Moderate [ ]Severe  Constipation:                    [ ]Mild [ ]Moderate [ ]Severe    PCSSQ[Palliative Care Spiritual Screening Question]   Severity (0-10):  Score of 4 or > indicate consideration of Chaplaincy referral.  Chaplaincy Referral: [ ] yes [ ] refused [ ] following [ ] Deferred     Caregiver [place]? : [ ] yes [ ] no [ ] Deferred [ ] Declined             Social work referral [ ] Patient & Family Centered Care Referral [ ]     Anticipatory Grief present?:  [ ] yes [ ] no  [ ] Deferred                  Social work referral [ ] Chaplaincy Referral [ ]    		  Other Symptoms:  [x ]All other review of systems negative- unable to participate in exam     Palliative Performance Status Version 2:   See PPSv2 tool and score below         PHYSICAL EXAM:  Vital Signs Last 24 Hrs  T(C): 37 (10 Angel 2024 14:16), Max: 37.3 (10 Angel 2024 05:30)  T(F): 98.6 (10 Angel 2024 14:16), Max: 99.1 (10 Angel 2024 05:30)  HR: 84 (10 Angel 2024 14:16) (80 - 90)  BP: 91/65 (10 Angel 2024 14:16) (91/65 - 122/68)  BP(mean): --  RR: 16 (10 Angel 2024 14:16) (16 - 18)  SpO2: 95% (10 Angel 2024 14:16) (94% - 97%)    Parameters below as of 10 Angel 2024 14:16  Patient On (Oxygen Delivery Method): room air     I&O's Summary    09 Jan 2024 07:01  -  10 Angel 2024 07:00  --------------------------------------------------------  IN: 0 mL / OUT: 450 mL / NET: -450 mL       GENERAL: [ ]Cachexia    [x ]Alert  [ ]Oriented x   [ ]Lethargic  [ ]Unarousable  [ ]Verbal  [x ]Non-Verbal  Behavioral:   [ ]Anxiety  [ ]Delirium [ ]Agitation [ ]Other  HEENT:  [ ]Normal   [ x]Dry mouth   [ ]ET Tube/Trach  [ ]Oral lesions  PULMONARY:   [ ]Clear [ ]Tachypnea  [ ]Audible excessive secretions   [ ]Rhonchi        [ ]Right [ ]Left [ ]Bilateral  [ ]Crackles        [ ]Right [ ]Left [ ]Bilateral  [ ]Wheezing     [ ]Right [ ]Left [ ]Bilateral  [x ]Diminished BS [ ] Right [ ]Left [ x]Bilateral  CARDIOVASCULAR:    [x ]Regular [ ]Irregular [ ]Tachy  [ ]James [ ]Murmur [ ]Other  GASTROINTESTINAL:  [x]Soft  [ ]Distended   [x]+BS  [x ]Non tender [ ]Tender  [ ]Other [ ]PEG [ x]OGT/ NGT   Last BM:   GENITOURINARY:  [ ]Normal [ x]Incontinent   [ ]Oliguria/Anuria   [ ]Spangler  MUSCULOSKELETAL:   [ ]Normal   [x ]Weakness  [x ]Bed/Wheelchair bound [ ]Edema Contracted   NEUROLOGIC:   [ ]No focal deficits  [x ] Cognitive impairment  [ x] Dysphagia [ ]Dysarthria [ ] Paresis [ ]Other   SKIN:   [ ]Normal  [ ]Rash  [ ]Other  [ ]Pressure ulcer(s) [ ]y [ ]n present on admission    CRITICAL CARE:  [ ]Shock Present  [ ]Septic [ ]Cardiogenic [ ]Neurologic [ ]Hypovolemic  [ ]Vasopressors [ ]Inotropes  [ ]Respiratory failure present [ ]Mechanical Ventilation [ ]Non-invasive ventilatory support [ ]High-Flow   [ ]Acute  [ ]Chronic [ ]Hypoxic  [ ]Hypercarbic [ ]Other  [ ]Other organ failure     LABS:                        9.7    11.10 )-----------( 350      ( 09 Jan 2024 06:56 )             30.1   01-09    136  |  101  |  11  ----------------------------<  126<H>  4.3   |  26  |  0.32<L>    Ca    8.4      09 Jan 2024 06:56  Phos  2.8     01-09  Mg     2.2     01-09    TPro  6.2  /  Alb  3.0<L>  /  TBili  0.4  /  DBili  x   /  AST  11  /  ALT  13  /  AlkPhos  72  01-09      Urinalysis Basic - ( 09 Jan 2024 06:56 )    Color: x / Appearance: x / SG: x / pH: x  Gluc: 126 mg/dL / Ketone: x  / Bili: x / Urobili: x   Blood: x / Protein: x / Nitrite: x   Leuk Esterase: x / RBC: x / WBC x   Sq Epi: x / Non Sq Epi: x / Bacteria: x      RADIOLOGY & ADDITIONAL STUDIES:  < from: Xray Chest 1 View- PORTABLE-Urgent (Xray Chest 1 View- PORTABLE-Urgent .) (01.08.24 @ 01:18) >    ACC: [id number] EXAM:  XR CHEST PORTABLE URGENT 1V   ORDERED BY:  DAVION JAMESON     PROCEDURE DATE:  01/08/2024          INTERPRETATION:  EXAMINATION: XR CHEST URGENT    CLINICAL INDICATION: ng tube placement    TECHNIQUE: Single frontal, portable view of the chest was obtained.    COMPARISON: Chest x-ray 1/7/2024.    FINDINGS:  Enteric tube with tip overlying distal esophagus.  Left thoracic surgical clips.  The heart is not accurately assessed in this AP projection.  Hazy opacity right lung base, likely small effusion with atelectasis.   Left lung is clear.  There is no pneumothorax.  No acute bony abnormality.    IMPRESSION:  Enteric tube with tip overlying distal esophagus.    --- End of Report ---           ROSELIA MOCTEZUMA MD; Resident Radiologist  This document has been electronically signed.  BRIAN OQUENDO MD; Attending Radiologist  This document has been electronically signed. Jan 8 2024  1:32PM    < end of copied text >    Protein Calorie Malnutrition Present: [ ]mild [ ]moderate [ ]severe [ ]underweight [ ]morbid obesity  https://www.andeal.org/vault/2440/web/files/ONC/Table_Clinical%20Characteristics%20to%20Document%20Malnutrition-White%20JV%20et%20al%168765.pdf    Height (cm): 160 (12-20-23 @ 10:46), 167.6 (09-18-23 @ 20:11), 165.1 (03-17-23 @ 08:17)  Weight (kg): 40.8 (12-20-23 @ 10:46), 49.9 (09-18-23 @ 20:11), 54.4 (03-17-23 @ 08:17)  BMI (kg/m2): 15.9 (12-20-23 @ 10:46), 17.8 (09-18-23 @ 20:11), 20 (03-17-23 @ 08:17)    [ x]PPSV2 < or = 30%  [ ]significant weight loss [ ]poor nutritional intake [ ]anasarca[ ]Artificial Nutrition    Other REFERRALS:  [ ]Hospice  [ ]Child Life  [ ]Social Work  [ ]Case management [ ]Holistic Therapy     Goals of Care Document:

## 2024-01-10 NOTE — SWALLOW FEES ASSESSMENT ADULT - PHARYNGEAL PHASE COMMENTS
Although subglottic material was not noted, cannot r/o aspiration given delayed cough and wet vocal quality following termination of this exam

## 2024-01-10 NOTE — PROGRESS NOTE ADULT - ASSESSMENT
68 year old female with history of breast cancer s/p L mastectomy, TBI (s/p a fall ~2 years ago) c/b aphasia, meningioma, hypothyroidism (hx Grave's dz s/p radioactive iodine), CVA, and dysphagia who presents to [facility] ED with increased lethargy and failure to thrive with initial sodium 166 [decreased to 141 within 48 hours].  Per son upon admission, patient developed inability to speak or tolerate food.  Hospital course c/b acute hypoxic respiratory failure and possible aspiration pneumonitis/pneumonia.      # Evaluation of PEG  # Hypernatremia, improved  # Acute hypoxic respiratory failure, improving  # Aspiration pneumonia    Recommendations:  -discussed at length with family, including patient's  Silverio Arthur; he states patient has history of TBI ~2 years ago and is aphasic at baseline however still is above to converse and interact with you; he endorses an acute change in the few days leading up to hospitalization; however no clinically significant improvement in mental status during hospitalization; patient cleared for pureed diet however patient simply not eating enough of her meals for adequate nutrition  -keep NPO at midnight, can offer attempt at endoscopic PEG if acceptable window and family amenable, however stomach appears to be obscured by liver on CT imaging    Mika Franklin  GI/Hepatology Fellow    MONDAY-FRIDAY 8AM-5PM:  Pager# [phone] ([facility]) or [phone] ([facility])    NON-URGENT CONSULTS:  Please email [email or web address] OR [email or web address]  AT NIGHT AND ON WEEKENDS:  Contact on-call GI fellow via answering service ([phone]) from 5pm-8am and on weekends/holidays.   68 year old female with history of breast cancer s/p L mastectomy, TBI (s/p a fall ~2 years ago) c/b aphasia, meningioma, hypothyroidism (hx Grave's dz s/p radioactive iodine), CVA, and dysphagia who presents to [facility] ED with increased lethargy and failure to thrive with initial sodium 166 [decreased to 141 within 48 hours].  Per son upon admission, patient developed inability to speak or tolerate food.  Hospital course c/b acute hypoxic respiratory failure and possible aspiration pneumonitis/pneumonia.      # Evaluation of PEG  # Hypernatremia, improved  # Acute hypoxic respiratory failure, improving  # Aspiration pneumonia    Recommendations:  -discussed at length with family, including patient's  Silverio Arthur; he states patient has history of TBI ~2 years ago and is aphasic at baseline however still is above to converse and interact with you; he endorses an acute change in the few days leading up to hospitalization; however no clinically significant improvement in mental status during hospitalization; patient cleared for pureed diet however patient simply not eating enough of her meals for adequate nutrition  -keep NPO at midnight, can offer attempt at endoscopic PEG if acceptable window and family amenable, however stomach appears to be obscured by liver on CT imaging    Mika Franklin  GI/Hepatology Fellow    MONDAY-FRIDAY 8AM-5PM:  Pager# [phone] ([facility]) or [phone] ([facility])    NON-URGENT CONSULTS:  Please email [email or web address] OR [email or web address]  AT NIGHT AND ON WEEKENDS:  Contact on-call GI fellow via answering service ([phone]) from 5pm-8am and on weekends/holidays.

## 2024-01-10 NOTE — PROGRESS NOTE ADULT - PROBLEM SELECTOR PLAN 1
pt remains NPO   appreciate speech pathology input  at this time goals remain to pursue peg   in the event GI feels PEG is contraindicated, GaP Team recommendation would be to pursue ethics consultation to protect patient safety, bodily integrity and autonomy

## 2024-01-10 NOTE — PROGRESS NOTE ADULT - SUBJECTIVE AND OBJECTIVE BOX
Interval Events:   No acute events noted, however unable to obtain full HPI due to underlying mental status.    ROS:   Unable to fully obtain ROS.    Hospital Medications:  ascorbic acid 500 milliGRAM(s) Oral daily  chlorhexidine 2% Cloths 1 Application(s) Topical daily  collagenase Ointment 1 Application(s) Topical daily  enoxaparin Injectable 40 milliGRAM(s) SubCutaneous every 24 hours  folic acid 1 milliGRAM(s) Oral daily  levothyroxine Injectable 56 MICROGram(s) IV Push at bedtime  multivitamin 1 Tablet(s) Oral daily  pantoprazole   Suspension 40 milliGRAM(s) Oral daily  polyethylene glycol 3350 17 Gram(s) Oral daily  senna 2 Tablet(s) Oral at bedtime  sertraline 25 milliGRAM(s) Oral daily  thiamine 100 milliGRAM(s) Oral daily      PHYSICAL EXAM:   Vital Signs:  Vital Signs Last 24 Hrs  T(C): 36.3 (10 Angel 2024 16:19), Max: 37.3 (10 Angel 2024 05:30)  T(F): 97.4 (10 Angel 2024 16:19), Max: 99.1 (10 Angel 2024 05:30)  HR: 85 (10 Angel 2024 16:19) (80 - 90)  BP: 90/57 (10 Angel 2024 16:19) (90/57 - 122/68)  BP(mean): --  RR: 16 (10 Angel 2024 16:19) (16 - 18)  SpO2: 98% (10 Angel 2024 16:19) (94% - 98%)    Parameters below as of 10 Angel 2024 16:19  Patient On (Oxygen Delivery Method): room air      Daily     Daily     GENERAL: no acute distress  NEURO: not fully alert/oriented  HEENT: NCAT, no conjunctival pallor appreciated  CHEST: no respiratory distress, no accessory muscle use  CARDIAC: regular rate, +S1/S2  ABDOMEN: soft, nontender, no rebound or guarding  EXTREMITIES: warm, well perfused  SKIN: no lesions noted    LABS: reviewed                        9.7    11.10 )-----------( 350      ( 09 Jan 2024 06:56 )             30.1     01-09    136  |  101  |  11  ----------------------------<  126<H>  4.3   |  26  |  0.32<L>    Ca    8.4      09 Jan 2024 06:56  Phos  2.8     01-09  Mg     2.2     01-09    TPro  6.2  /  Alb  3.0<L>  /  TBili  0.4  /  DBili  x   /  AST  11  /  ALT  13  /  AlkPhos  72  01-09    LIVER FUNCTIONS - ( 09 Jan 2024 06:56 )  Alb: 3.0 g/dL / Pro: 6.2 g/dL / ALK PHOS: 72 U/L / ALT: 13 U/L / AST: 11 U/L / GGT: x             Interval Diagnostic Studies: see sunrise for full report   Interval Events:   No acute events noted, however unable to obtain full HPI due to underlying mental status.    ROS:   Unable to fully obtain ROS.    Hospital Medications:  ascorbic acid 500 milliGRAM(s) Oral daily  chlorhexidine 2% Cloths 1 Application(s) Topical daily  collagenase Ointment 1 Application(s) Topical daily  enoxaparin Injectable 40 milliGRAM(s) SubCutaneous every 24 hours  folic acid 1 milliGRAM(s) Oral daily  levothyroxine Injectable 56 MICROGram(s) IV Push at bedtime  multivitamin 1 Tablet(s) Oral daily  pantoprazole   Suspension 40 milliGRAM(s) Oral daily  polyethylene glycol 3350 17 Gram(s) Oral daily  senna 2 Tablet(s) Oral at bedtime  sertraline 25 milliGRAM(s) Oral daily  thiamine 100 milliGRAM(s) Oral daily      PHYSICAL EXAM:   Vital Signs:  Vital Signs Last 24 Hrs  T(C): 36.3 (10 Angel 2024 16:19), Max: 37.3 (10 Angel 2024 05:30)  T(F): 97.4 (10 Angel 2024 16:19), Max: 99.1 (10 Angel 2024 05:30)  HR: 85 (10 Angel 2024 16:19) (80 - 90)  BP: 90/57 (10 Angel 2024 16:19) (90/57 - 122/68)  BP(mean): --  RR: 16 (10 Angel 2024 16:19) (16 - 18)  SpO2: 98% (10 Angel 2024 16:19) (94% - 98%)    Parameters below as of 10 Angel 2024 16:19  Patient On (Oxygen Delivery Method): room air      Daily     Daily     GENERAL: no acute distress  NEURO: not fully alert/oriented x 0  HEENT: NCAT, no conjunctival pallor appreciated  CHEST: no respiratory distress, no accessory muscle use  CARDIAC: regular rate, +S1/S2  ABDOMEN: soft, nontender, no rebound or guarding  EXTREMITIES: warm, well perfused  SKIN: no lesions noted    LABS: reviewed                        9.7    11.10 )-----------( 350      ( 09 Jan 2024 06:56 )             30.1     01-09    136  |  101  |  11  ----------------------------<  126<H>  4.3   |  26  |  0.32<L>    Ca    8.4      09 Jan 2024 06:56  Phos  2.8     01-09  Mg     2.2     01-09    TPro  6.2  /  Alb  3.0<L>  /  TBili  0.4  /  DBili  x   /  AST  11  /  ALT  13  /  AlkPhos  72  01-09    LIVER FUNCTIONS - ( 09 Jan 2024 06:56 )  Alb: 3.0 g/dL / Pro: 6.2 g/dL / ALK PHOS: 72 U/L / ALT: 13 U/L / AST: 11 U/L / GGT: x             Interval Diagnostic Studies: see sunrise for full report

## 2024-01-10 NOTE — SWALLOW FEES ASSESSMENT ADULT - SLP GENERAL OBSERVATIONS
Encountered Pt laying poorly positioned in bed on RA. +open mouth posture. Pt subsequently repositioned upright w/ pillows provided support of b/l LE contractures. +NGT in L nare; tube feeds held throughout this exam. Pt is alert, nonverbal, and noted w/ limited attempts to follow simple commands. Exam performed in conjunction w/ SLP Aicha Garcia.

## 2024-01-10 NOTE — PROGRESS NOTE ADULT - PROBLEM SELECTOR PLAN 3
appreciate primary team input, pt with worsening PO intake, unable to tolerate feeding from bedside RN   GOC discussed regarding peg, at this time patient's  feels this is in line with GOC

## 2024-01-10 NOTE — PROGRESS NOTE ADULT - ATTENDING COMMENTS
68F with PMH significant for breast cancer s/p L mastectomy, TBI (s/p a fall ~2 years ago) c/b aphasia, meningioma, hypothyroidism (hx Grave's dz s/p radioactive iodine), CVA, and dysphagia who presents to [facility] ED on 12/20/2023 with no PO intake and increased lethargy.  Patient was admitted for hypernatremia 166 and UTI. Hospital course complicated by AHRF likely 2/2 to aspiration pneumonitis, pending further GOC with HCP (  )     Patient seen with her hands crossed ( intentional by patient ) , eyes open and following any commands , mouth open     # Acute hypoxic respiratory failure secondary to likely aspiration     S/P  5 days  of Zosyn.  SPO2 stable      cont to monitor      Mild leucocytosis with no fever --> will repeat in AM ( 1/11) , no fever , will continue to monitor   #  Dysphagia with severe malnutrition      Feeding  via NGT      Pt is not alert enough for oral feeding      Unsure if oral intake will be enough to satisfy caloric needs     GOC with family by palliative care and the medical residents and HCP opts for PEG placement--> GI consult is submitted   # Hypernatremia- resolved so far    CW free water boluses via NGT ( 300ml every 4 hours )   # Functional quadriplegia with multiple pressure ulcers- wound care following      cont to monitor      Dietitian is on the case   #  Hypothyroidism   Continue levothyroxine IV 56 mcg QD so far   Prognosis guarded- remains full code.     case is discussed with the resident for Team 5     Yesenia Brown   [facility]   available on TEAMS 68F with PMH significant for breast cancer s/p L mastectomy, TBI (s/p a fall ~2 years ago) c/b aphasia, meningioma, hypothyroidism (hx Grave's dz s/p radioactive iodine), CVA, and dysphagia who presents to [facility] ED on 12/20/2023 with no PO intake and increased lethargy.  Patient was admitted for hypernatremia 166 and UTI. Hospital course complicated by AHRF likely 2/2 to aspiration pneumonitis, pending further GOC with HCP (  )     Patient seen with her hands crossed ( intentional by patient ) , eyes open and following any commands , mouth open     # Acute hypoxic respiratory failure secondary to likely aspiration     S/P  5 days  of Zosyn.  SPO2 stable      cont to monitor      Mild leucocytosis with no fever --> will repeat in AM ( 1/11) , no fever , will continue to monitor   #  Dysphagia with severe malnutrition      Feeding  via NGT      Pt is not alert enough for oral feeding      Unsure if oral intake will be enough to satisfy caloric needs     GOC with family by palliative care and the medical residents and HCP opts for PEG placement--> GI consult is submitted   # Hypernatremia- resolved so far    CW free water boluses via NGT ( 300ml every 4 hours )   # Functional quadriplegia with multiple pressure ulcers- wound care following      cont to monitor      Dietitian is on the case   #  Hypothyroidism   Continue levothyroxine IV 56 mcg QD so far   Prognosis guarded- remains full code.     case is discussed with the resident for Team 5     Yesenia Brown   [facility]   available on TEAMS

## 2024-01-10 NOTE — PROGRESS NOTE ADULT - PROBLEM SELECTOR PLAN 1
Hx of dysphagia since 2-3 months ago per son. Notable for severe b/l ext contracture and cachexia. Low PO intake at baseline, no PO intake in the last 2-3 days. NGT placed 12/22, subsequent removals and replacements with most recent placed on 1/7.  SLP cleared patient for Puree/moderately thick liquids via TSP amount only only when the patient is awake and alert; however pt unlikely to get adequate calories.  understands risks of aspiration but would like to attempt feeding.  GI consulted for PEG, ongoing GOC on final decision from family.   would like to explore any reversible causes prior to PEG placement.  However discussed w/  worsening PO intake and dysphagia likely 2/2 to progression of disease, unlikely reversible.   would like to try boosting her caloric intake with NGT supplementation to see if that improves her ability to tolerate food.   would like to try PO feeding with TSP is patient tolerates, understands and accepts risk of aspiration.   is open to palliative care consult, as he has difficulty taking care of patient at home.  - Continue tube feeds   - Will attempt PO feeding with TSP so long as patient is awake and alert  - Calorie count  - Palliative care consulted, appreciate recs  - Will attempt FEESST if patient tolerates Hx of dysphagia since 2-3 months ago per son. Notable for severe b/l ext contracture and cachexia. Low PO intake at baseline, no PO intake in the last 2-3 days. NGT placed 12/22, subsequent removals and replacements with most recent placed on 1/7.  SLP cleared patient for Puree/moderately thick liquids via TSP amount only only when the patient is awake and alert; however pt unlikely to get adequate calories.  understands risks of aspiration but would like to attempt feeding.  GI consulted for PEG, ongoing GOC on final decision from family.   would like to explore any reversible causes prior to PEG placement.  However discussed w/  worsening PO intake and dysphagia likely 2/2 to progression of disease, unlikely reversible.   would like to try boosting her caloric intake with tube feed supplementation to see if that improves her ability to tolerate food.   would like to try PO feeding with TSP is patient tolerates, understands and accepts risk of aspiration.  After patient had not had much recovery in functional status with feeding, palliative care discussion occurred on 1/10 with  requesting PEG placement after risks and benefits were discussed.   would ultimately desire that patient regains some functional capacity, and would consider taking patient home with health aid or rehabilitation as well.  - Continue tube feeds   - Will attempt PO feeding with TSP so long as patient is awake and alert  - Calorie count  - Palliative care consulted, appreciate recs  - Will attempt FEESST if patient tolerates  - GI consulted regarding PEG

## 2024-01-10 NOTE — PROGRESS NOTE ADULT - PROBLEM SELECTOR PLAN 6
will sign off as goals established  case discussed with primary team  Can be reached by TEAMS M-F 9-5 Erika Rodríguez Any other time please page [phone] if needed will sign off as goals established  case discussed with primary team  Can be reached by TEAMS M-F 9-5 Erika Rodríguez Any other time please page [phone] if needed

## 2024-01-10 NOTE — SWALLOW FEES ASSESSMENT ADULT - LARYNGEAL PENETRATION DURING SWALLOW - SILENT
Deep penetration to the level of the vocal cords; residue remains within the laryngeal vestibule despite cued cough Trace-mild residue remains along the superior laryngeal surface of the epiglottis post-swallow; improved w/ spontaneous repeat swallow

## 2024-01-10 NOTE — PROGRESS NOTE ADULT - PROBLEM SELECTOR PLAN 4
Patient is anemic chronically, with recent Hgb drop.  CT C/A/P with no signs of active bleeding or hematoma.  Hgb stable

## 2024-01-11 LAB
ALBUMIN SERPL ELPH-MCNC: 2.8 G/DL — LOW (ref 3.3–5)
ALBUMIN SERPL ELPH-MCNC: 2.8 G/DL — LOW (ref 3.3–5)
ALP SERPL-CCNC: 63 U/L — SIGNIFICANT CHANGE UP (ref 40–120)
ALP SERPL-CCNC: 63 U/L — SIGNIFICANT CHANGE UP (ref 40–120)
ALT FLD-CCNC: 7 U/L — LOW (ref 10–45)
ALT FLD-CCNC: 7 U/L — LOW (ref 10–45)
ANION GAP SERPL CALC-SCNC: 10 MMOL/L — SIGNIFICANT CHANGE UP (ref 5–17)
ANION GAP SERPL CALC-SCNC: 10 MMOL/L — SIGNIFICANT CHANGE UP (ref 5–17)
APTT BLD: 29.2 SEC — SIGNIFICANT CHANGE UP (ref 24.5–35.6)
APTT BLD: 29.2 SEC — SIGNIFICANT CHANGE UP (ref 24.5–35.6)
AST SERPL-CCNC: 11 U/L — SIGNIFICANT CHANGE UP (ref 10–40)
AST SERPL-CCNC: 11 U/L — SIGNIFICANT CHANGE UP (ref 10–40)
BILIRUB SERPL-MCNC: 0.5 MG/DL — SIGNIFICANT CHANGE UP (ref 0.2–1.2)
BILIRUB SERPL-MCNC: 0.5 MG/DL — SIGNIFICANT CHANGE UP (ref 0.2–1.2)
BUN SERPL-MCNC: 10 MG/DL — SIGNIFICANT CHANGE UP (ref 7–23)
BUN SERPL-MCNC: 10 MG/DL — SIGNIFICANT CHANGE UP (ref 7–23)
CALCIUM SERPL-MCNC: 8.2 MG/DL — LOW (ref 8.4–10.5)
CALCIUM SERPL-MCNC: 8.2 MG/DL — LOW (ref 8.4–10.5)
CHLORIDE SERPL-SCNC: 101 MMOL/L — SIGNIFICANT CHANGE UP (ref 96–108)
CHLORIDE SERPL-SCNC: 101 MMOL/L — SIGNIFICANT CHANGE UP (ref 96–108)
CO2 SERPL-SCNC: 26 MMOL/L — SIGNIFICANT CHANGE UP (ref 22–31)
CO2 SERPL-SCNC: 26 MMOL/L — SIGNIFICANT CHANGE UP (ref 22–31)
CREAT SERPL-MCNC: 0.3 MG/DL — LOW (ref 0.5–1.3)
CREAT SERPL-MCNC: 0.3 MG/DL — LOW (ref 0.5–1.3)
EGFR: 114 ML/MIN/1.73M2 — SIGNIFICANT CHANGE UP
EGFR: 114 ML/MIN/1.73M2 — SIGNIFICANT CHANGE UP
GLUCOSE BLDC GLUCOMTR-MCNC: 114 MG/DL — HIGH (ref 70–99)
GLUCOSE BLDC GLUCOMTR-MCNC: 114 MG/DL — HIGH (ref 70–99)
GLUCOSE BLDC GLUCOMTR-MCNC: 115 MG/DL — HIGH (ref 70–99)
GLUCOSE BLDC GLUCOMTR-MCNC: 115 MG/DL — HIGH (ref 70–99)
GLUCOSE BLDC GLUCOMTR-MCNC: 130 MG/DL — HIGH (ref 70–99)
GLUCOSE BLDC GLUCOMTR-MCNC: 130 MG/DL — HIGH (ref 70–99)
GLUCOSE SERPL-MCNC: 101 MG/DL — HIGH (ref 70–99)
GLUCOSE SERPL-MCNC: 101 MG/DL — HIGH (ref 70–99)
HCT VFR BLD CALC: 32.2 % — LOW (ref 34.5–45)
HCT VFR BLD CALC: 32.2 % — LOW (ref 34.5–45)
HGB BLD-MCNC: 10.1 G/DL — LOW (ref 11.5–15.5)
HGB BLD-MCNC: 10.1 G/DL — LOW (ref 11.5–15.5)
INR BLD: 1.18 RATIO — SIGNIFICANT CHANGE UP (ref 0.85–1.18)
INR BLD: 1.18 RATIO — SIGNIFICANT CHANGE UP (ref 0.85–1.18)
MAGNESIUM SERPL-MCNC: 2.2 MG/DL — SIGNIFICANT CHANGE UP (ref 1.6–2.6)
MAGNESIUM SERPL-MCNC: 2.2 MG/DL — SIGNIFICANT CHANGE UP (ref 1.6–2.6)
MCHC RBC-ENTMCNC: 27.1 PG — SIGNIFICANT CHANGE UP (ref 27–34)
MCHC RBC-ENTMCNC: 27.1 PG — SIGNIFICANT CHANGE UP (ref 27–34)
MCHC RBC-ENTMCNC: 31.4 GM/DL — LOW (ref 32–36)
MCHC RBC-ENTMCNC: 31.4 GM/DL — LOW (ref 32–36)
MCV RBC AUTO: 86.3 FL — SIGNIFICANT CHANGE UP (ref 80–100)
MCV RBC AUTO: 86.3 FL — SIGNIFICANT CHANGE UP (ref 80–100)
NRBC # BLD: 0 /100 WBCS — SIGNIFICANT CHANGE UP (ref 0–0)
NRBC # BLD: 0 /100 WBCS — SIGNIFICANT CHANGE UP (ref 0–0)
PHOSPHATE SERPL-MCNC: 2.8 MG/DL — SIGNIFICANT CHANGE UP (ref 2.5–4.5)
PHOSPHATE SERPL-MCNC: 2.8 MG/DL — SIGNIFICANT CHANGE UP (ref 2.5–4.5)
PLATELET # BLD AUTO: 329 K/UL — SIGNIFICANT CHANGE UP (ref 150–400)
PLATELET # BLD AUTO: 329 K/UL — SIGNIFICANT CHANGE UP (ref 150–400)
POTASSIUM SERPL-MCNC: 4.3 MMOL/L — SIGNIFICANT CHANGE UP (ref 3.5–5.3)
POTASSIUM SERPL-MCNC: 4.3 MMOL/L — SIGNIFICANT CHANGE UP (ref 3.5–5.3)
POTASSIUM SERPL-SCNC: 4.3 MMOL/L — SIGNIFICANT CHANGE UP (ref 3.5–5.3)
POTASSIUM SERPL-SCNC: 4.3 MMOL/L — SIGNIFICANT CHANGE UP (ref 3.5–5.3)
PROT SERPL-MCNC: 6.3 G/DL — SIGNIFICANT CHANGE UP (ref 6–8.3)
PROT SERPL-MCNC: 6.3 G/DL — SIGNIFICANT CHANGE UP (ref 6–8.3)
PROTHROM AB SERPL-ACNC: 12.3 SEC — SIGNIFICANT CHANGE UP (ref 9.5–13)
PROTHROM AB SERPL-ACNC: 12.3 SEC — SIGNIFICANT CHANGE UP (ref 9.5–13)
RBC # BLD: 3.73 M/UL — LOW (ref 3.8–5.2)
RBC # BLD: 3.73 M/UL — LOW (ref 3.8–5.2)
RBC # FLD: 17.2 % — HIGH (ref 10.3–14.5)
RBC # FLD: 17.2 % — HIGH (ref 10.3–14.5)
SODIUM SERPL-SCNC: 137 MMOL/L — SIGNIFICANT CHANGE UP (ref 135–145)
SODIUM SERPL-SCNC: 137 MMOL/L — SIGNIFICANT CHANGE UP (ref 135–145)
WBC # BLD: 9.94 K/UL — SIGNIFICANT CHANGE UP (ref 3.8–10.5)
WBC # BLD: 9.94 K/UL — SIGNIFICANT CHANGE UP (ref 3.8–10.5)
WBC # FLD AUTO: 9.94 K/UL — SIGNIFICANT CHANGE UP (ref 3.8–10.5)
WBC # FLD AUTO: 9.94 K/UL — SIGNIFICANT CHANGE UP (ref 3.8–10.5)

## 2024-01-11 PROCEDURE — 99233 SBSQ HOSP IP/OBS HIGH 50: CPT | Mod: GC

## 2024-01-11 PROCEDURE — 97598 DBRDMT OPN WND ADDL 20CM/<: CPT

## 2024-01-11 PROCEDURE — 43246 EGD PLACE GASTROSTOMY TUBE: CPT | Mod: GC

## 2024-01-11 PROCEDURE — 97597 DBRDMT OPN WND 1ST 20 CM/<: CPT

## 2024-01-11 PROCEDURE — 99233 SBSQ HOSP IP/OBS HIGH 50: CPT | Mod: 25

## 2024-01-11 DEVICE — PEG KT SAFETY 20FR: Type: IMPLANTABLE DEVICE | Status: FUNCTIONAL

## 2024-01-11 RX ORDER — SODIUM HYPOCHLORITE 0.125 %
1 SOLUTION, NON-ORAL MISCELLANEOUS EVERY 8 HOURS
Refills: 0 | Status: DISCONTINUED | OUTPATIENT
Start: 2024-01-11 | End: 2024-01-18

## 2024-01-11 RX ORDER — SODIUM CHLORIDE 9 MG/ML
500 INJECTION INTRAMUSCULAR; INTRAVENOUS; SUBCUTANEOUS
Refills: 0 | Status: DISCONTINUED | OUTPATIENT
Start: 2024-01-11 | End: 2024-01-17

## 2024-01-11 RX ORDER — POVIDONE-IODINE 5 %
1 AEROSOL (ML) TOPICAL DAILY
Refills: 0 | Status: DISCONTINUED | OUTPATIENT
Start: 2024-01-11 | End: 2024-01-18

## 2024-01-11 RX ADMIN — Medication 56 MICROGRAM(S): at 21:51

## 2024-01-11 RX ADMIN — CHLORHEXIDINE GLUCONATE 1 APPLICATION(S): 213 SOLUTION TOPICAL at 17:23

## 2024-01-11 RX ADMIN — ENOXAPARIN SODIUM 40 MILLIGRAM(S): 100 INJECTION SUBCUTANEOUS at 15:21

## 2024-01-11 NOTE — PROGRESS NOTE ADULT - PROBLEM SELECTOR PLAN 1
Hx of dysphagia since 2-3 months ago per son. Notable for severe b/l ext contracture and cachexia. Low PO intake at baseline, no PO intake in the last 2-3 days. NGT placed 12/22, subsequent removals and replacements with most recent placed on 1/7.  SLP cleared patient for Puree/moderately thick liquids via TSP amount only only when the patient is awake and alert; however pt unlikely to get adequate calories.  understands risks of aspiration but would like to attempt feeding.  GI consulted for PEG, ongoing GOC on final decision from family.   would like to explore any reversible causes prior to PEG placement.  However discussed w/  worsening PO intake and dysphagia likely 2/2 to progression of disease, unlikely reversible.   would like to try boosting her caloric intake with tube feed supplementation to see if that improves her ability to tolerate food.   would like to try PO feeding with TSP is patient tolerates, understands and accepts risk of aspiration.  After patient had not had much recovery in functional status with feeding, palliative care discussion occurred on 1/10 with  requesting PEG placement after risks and benefits were discussed.   would ultimately desire that patient regains some functional capacity, and would consider taking patient home with health aid or rehabilitation as well.  - Continue tube feeds   - Will attempt PO feeding with TSP so long as patient is awake and alert  - Calorie count  - Palliative care consulted, appreciate recs  - Will attempt FEESST if patient tolerates  - GI consulted regarding PEG Hx of dysphagia since 2-3 months ago per son. Notable for severe b/l ext contracture and cachexia. Low PO intake at baseline, no PO intake in the last 2-3 days. NGT placed 12/22, subsequent removals and replacements with most recent placed on 1/7.  SLP cleared patient for Puree/moderately thick liquids via TSP amount only only when the patient is awake and alert; however pt unlikely to get adequate calories.  understands risks of aspiration but would like to attempt feeding.  GI consulted for PEG, ongoing GOC on final decision from family.   would like to explore any reversible causes prior to PEG placement.  However discussed w/  worsening PO intake and dysphagia likely 2/2 to progression of disease, unlikely reversible.   would like to try boosting her caloric intake with tube feed supplementation to see if that improves her ability to tolerate food.   would like to try PO feeding with TSP is patient tolerates, understands and accepts risk of aspiration.  After patient had not had much recovery in functional status with feeding, palliative care discussion occurred on 1/10 with  requesting PEG placement after risks and benefits were discussed.   would ultimately desire that patient regains some functional capacity, and would consider taking patient home with health aid or rehabilitation as well.  - Continue tube feeds   - Will attempt PO feeding with TSP so long as patient is awake and alert  - Calorie count  - Palliative care consulted, appreciate recs  - Will attempt FEESST if patient tolerates  - GI consulted regarding PEG, likely place 1/11

## 2024-01-11 NOTE — PROGRESS NOTE ADULT - NUTRITIONAL ASSESSMENT
This patient has been assessed with a concern for Malnutrition and has been determined to have a diagnosis/diagnoses of Severe protein-calorie malnutrition and Underweight (BMI < 19).    This patient is being managed with:   Diet NPO after Midnight-     NPO Start Date: 10-Angel-2024   NPO Start Time: 23:59  Entered: Angel 10 2024  6:25PM    Diet Pureed-  Moderately Thick Liquids (MODTHICKLIQS)  Tube Feeding Modality: Nasogastric  Glucerna 1.2 Vidal (GLUCERNARTH)  Total Volume for 24 Hours (mL): 1320  Continuous  Starting Tube Feed Rate {mL per Hour}: 55  Until Goal Tube Feed Rate (mL per Hour): 55  Tube Feed Duration (in Hours): 24  Tube Feed Start Time: 15:15  Carlos(7 Gm Arginine/7 Gm Glut/1.2 Gm HMB     Qty per Day:  2  Entered: Jan 8 2024  4:24PM

## 2024-01-11 NOTE — PROGRESS NOTE ADULT - SUBJECTIVE AND OBJECTIVE BOX
***************************************************************  Bassem Hernandez, PG1  Internal Medicine   Pager:  TEAMS  ***************************************************************    JONATHAN YOUNGER  68y  MRN: [id number]    Patient is a 68y old  Female who presents with a chief complaint of No PO intake, lethargy (11 Jan 2024 06:25)      Interval/Overnight Events: no events ON.     Subjective: Pt seen and examined at bedside. NPO for possible PEG today.  Awakens to voice, squeezes fingers on command.  Tries to voice her name, but unsuccessful.     MEDICATIONS  (STANDING):  ascorbic acid 500 milliGRAM(s) Oral daily  chlorhexidine 2% Cloths 1 Application(s) Topical daily  collagenase Ointment 1 Application(s) Topical daily  enoxaparin Injectable 40 milliGRAM(s) SubCutaneous every 24 hours  folic acid 1 milliGRAM(s) Oral daily  levothyroxine Injectable 56 MICROGram(s) IV Push at bedtime  multivitamin 1 Tablet(s) Oral daily  pantoprazole   Suspension 40 milliGRAM(s) Oral daily  polyethylene glycol 3350 17 Gram(s) Oral daily  senna 2 Tablet(s) Oral at bedtime  sertraline 25 milliGRAM(s) Oral daily  sodium chloride 0.9%. 500 milliLiter(s) (30 mL/Hr) IV Continuous <Continuous>  thiamine 100 milliGRAM(s) Oral daily    MEDICATIONS  (PRN):      Objective:    Vitals: Vital Signs Last 24 Hrs  T(C): 36.6 (01-11-24 @ 12:13), Max: 37 (01-10-24 @ 14:16)  T(F): 97.9 (01-11-24 @ 12:13), Max: 98.6 (01-10-24 @ 14:16)  HR: 85 (01-11-24 @ 12:13) (76 - 85)  BP: 86/54 (01-11-24 @ 12:13) (86/54 - 103/68)  BP(mean): --  RR: 15 (01-11-24 @ 12:13) (15 - 18)  SpO2: 99% (01-11-24 @ 12:13) (95% - 99%)                I&O's Summary    10 Angel 2024 07:01  -  11 Jan 2024 07:00  --------------------------------------------------------  IN: 0 mL / OUT: 200 mL / NET: -200 mL        PHYSICAL EXAM:  GENERAL: NAD, cachetic, chronically ill appearing   HEAD:  Atraumatic, Normocephalic  EYES: PERRL, conjunctiva and sclera clear  ENMT: dry mucous membranes   NECK: Supple  CHEST/LUNG: Clear to auscultation bilaterally; No rales, rhonchi, wheezing, or rubs but difficult to assess due to contracted habitus  HEART: Regular rate and rhythm; No murmurs, rubs, or gallops  ABDOMEN: Soft, Nontender, Nondistended; Bowel sounds present  EXTREMITIES:  no clubbing, cyanosis, or edema   SKIN: No rashes or lesions  NERVOUS SYSTEM:  Alert, opens eyes to voice, contracted extremities, squeezes fingers on command  PSYCH: Unable to assess    LABS:                        10.1   9.94  )-----------( 329      ( 11 Jan 2024 06:56 )             32.2                         9.7    11.10 )-----------( 350      ( 09 Jan 2024 06:56 )             30.1     01-11    137  |  101  |  10  ----------------------------<  101<H>  4.3   |  26  |  0.30<L>  01-09    136  |  101  |  11  ----------------------------<  126<H>  4.3   |  26  |  0.32<L>    Ca    8.2<L>      11 Jan 2024 06:54  Ca    8.4      09 Jan 2024 06:56  Phos  2.8     01-11  Mg     2.2     01-11    TPro  6.3  /  Alb  2.8<L>  /  TBili  0.5  /  DBili  x   /  AST  11  /  ALT  7<L>  /  AlkPhos  63  01-11  TPro  6.2  /  Alb  3.0<L>  /  TBili  0.4  /  DBili  x   /  AST  11  /  ALT  13  /  AlkPhos  72  01-09    CAPILLARY BLOOD GLUCOSE      POCT Blood Glucose.: 114 mg/dL (11 Jan 2024 06:09)  POCT Blood Glucose.: 130 mg/dL (11 Jan 2024 00:40)  POCT Blood Glucose.: 163 mg/dL (10 Angel 2024 17:48)    PT/INR - ( 11 Jan 2024 06:55 )   PT: 12.3 sec;   INR: 1.18 ratio         PTT - ( 11 Jan 2024 06:55 )  PTT:29.2 sec    Urinalysis Basic - ( 11 Jan 2024 06:54 )    Color: x / Appearance: x / SG: x / pH: x  Gluc: 101 mg/dL / Ketone: x  / Bili: x / Urobili: x   Blood: x / Protein: x / Nitrite: x   Leuk Esterase: x / RBC: x / WBC x   Sq Epi: x / Non Sq Epi: x / Bacteria: x          RADIOLOGY & ADDITIONAL TESTS:    Imaging Personally Reviewed:  [x ] YES  [ ] NO    Consultants involved in case:   Consultant(s) Notes Reviewed:  [ x] YES  [ ] NO:   Care Discussed with Consultants/Other Providers [x ] YES  [ ] NO         ***************************************************************  Bassem Hernandez, PG1  Internal Medicine   Pager:  TEAMS  ***************************************************************    JONATHNA YOUNGER  68y  MRN: [id number]    Patient is a 68y old  Female who presents with a chief complaint of No PO intake, lethargy (11 Jan 2024 06:25)      Interval/Overnight Events: no events ON.     Subjective: Pt seen and examined at bedside. NPO for possible PEG today.  Awakens to voice, squeezes fingers on command.  Tries to voice her name, but unsuccessful.     MEDICATIONS  (STANDING):  ascorbic acid 500 milliGRAM(s) Oral daily  chlorhexidine 2% Cloths 1 Application(s) Topical daily  collagenase Ointment 1 Application(s) Topical daily  enoxaparin Injectable 40 milliGRAM(s) SubCutaneous every 24 hours  folic acid 1 milliGRAM(s) Oral daily  levothyroxine Injectable 56 MICROGram(s) IV Push at bedtime  multivitamin 1 Tablet(s) Oral daily  pantoprazole   Suspension 40 milliGRAM(s) Oral daily  polyethylene glycol 3350 17 Gram(s) Oral daily  senna 2 Tablet(s) Oral at bedtime  sertraline 25 milliGRAM(s) Oral daily  sodium chloride 0.9%. 500 milliLiter(s) (30 mL/Hr) IV Continuous <Continuous>  thiamine 100 milliGRAM(s) Oral daily    MEDICATIONS  (PRN):      Objective:    Vitals: Vital Signs Last 24 Hrs  T(C): 36.6 (01-11-24 @ 12:13), Max: 37 (01-10-24 @ 14:16)  T(F): 97.9 (01-11-24 @ 12:13), Max: 98.6 (01-10-24 @ 14:16)  HR: 85 (01-11-24 @ 12:13) (76 - 85)  BP: 86/54 (01-11-24 @ 12:13) (86/54 - 103/68)  BP(mean): --  RR: 15 (01-11-24 @ 12:13) (15 - 18)  SpO2: 99% (01-11-24 @ 12:13) (95% - 99%)                I&O's Summary    10 Angel 2024 07:01  -  11 Jan 2024 07:00  --------------------------------------------------------  IN: 0 mL / OUT: 200 mL / NET: -200 mL        PHYSICAL EXAM:  GENERAL: NAD, cachetic, chronically ill appearing   HEAD:  Atraumatic, Normocephalic  EYES: PERRL, conjunctiva and sclera clear  ENMT: dry mucous membranes   NECK: Supple  CHEST/LUNG: Clear to auscultation bilaterally; No rales, rhonchi, wheezing, or rubs but difficult to assess due to contracted habitus  HEART: Regular rate and rhythm; No murmurs, rubs, or gallops  ABDOMEN: Soft, Nontender, Nondistended; Bowel sounds present  EXTREMITIES:  no clubbing, cyanosis, or edema   SKIN: No rashes or lesions  NERVOUS SYSTEM:  Alert, opens eyes to voice, contracted extremities, squeezes fingers on command  PSYCH: Unable to assess    LABS:                        10.1   9.94  )-----------( 329      ( 11 Jan 2024 06:56 )             32.2                         9.7    11.10 )-----------( 350      ( 09 Jan 2024 06:56 )             30.1     01-11    137  |  101  |  10  ----------------------------<  101<H>  4.3   |  26  |  0.30<L>  01-09    136  |  101  |  11  ----------------------------<  126<H>  4.3   |  26  |  0.32<L>    Ca    8.2<L>      11 Jan 2024 06:54  Ca    8.4      09 Jan 2024 06:56  Phos  2.8     01-11  Mg     2.2     01-11    TPro  6.3  /  Alb  2.8<L>  /  TBili  0.5  /  DBili  x   /  AST  11  /  ALT  7<L>  /  AlkPhos  63  01-11  TPro  6.2  /  Alb  3.0<L>  /  TBili  0.4  /  DBili  x   /  AST  11  /  ALT  13  /  AlkPhos  72  01-09    CAPILLARY BLOOD GLUCOSE      POCT Blood Glucose.: 114 mg/dL (11 Jan 2024 06:09)  POCT Blood Glucose.: 130 mg/dL (11 Jan 2024 00:40)  POCT Blood Glucose.: 163 mg/dL (10 Angel 2024 17:48)    PT/INR - ( 11 Jan 2024 06:55 )   PT: 12.3 sec;   INR: 1.18 ratio         PTT - ( 11 Jan 2024 06:55 )  PTT:29.2 sec    Urinalysis Basic - ( 11 Jan 2024 06:54 )    Color: x / Appearance: x / SG: x / pH: x  Gluc: 101 mg/dL / Ketone: x  / Bili: x / Urobili: x   Blood: x / Protein: x / Nitrite: x   Leuk Esterase: x / RBC: x / WBC x   Sq Epi: x / Non Sq Epi: x / Bacteria: x          RADIOLOGY & ADDITIONAL TESTS:    Imaging Personally Reviewed:  [x ] YES  [ ] NO    Consultants involved in case:   Consultant(s) Notes Reviewed:  [ x] YES  [ ] NO:   Care Discussed with Consultants/Other Providers [x ] YES  [ ] NO

## 2024-01-11 NOTE — PROGRESS NOTE ADULT - ASSESSMENT
68F with PMH significant for breast cancer s/p L mastectomy, TBI (s/p a fall ~2 years ago) c/b aphasia, meningioma, hypothyroidism (hx Grave's dz s/p radioactive iodine), CVA, and dysphagia who presents to [facility] ED on 12/20/2023 with no PO intake and increased lethargy in the last 2-3 days, admitted for hypernatremia 166 and UTI. Hospital course complicated by AHRF likely 2/2 to aspiration pneumonitis, pending PEG evaluation   68F with PMH significant for breast cancer s/p L mastectomy, TBI (s/p a fall ~2 years ago) c/b aphasia, meningioma, hypothyroidism (hx Grave's dz s/p radioactive iodine), CVA, and dysphagia who presents to [facility] ED on 12/20/2023 with no PO intake and increased lethargy in the last 2-3 days, admitted for hypernatremia 166 and UTI. Hospital course complicated by AHRF likely 2/2 to aspiration pneumonitis, pending PEG evaluation

## 2024-01-11 NOTE — PROGRESS NOTE ADULT - ATTENDING COMMENTS
68F with PMH significant for breast cancer s/p L mastectomy, TBI (s/p a fall ~2 years ago) c/b aphasia, meningioma, hypothyroidism (hx Grave's dz s/p radioactive iodine), CVA, and dysphagia who presents to [facility] ED on 12/20/2023 with no PO intake and increased lethargy.  Patient was admitted for hypernatremia 166 and UTI. Hospital course complicated by AHRF likely 2/2 to aspiration pneumonitis, pending further GOC with HCP (  )     Patient seen with her hands crossed ( intentional by patient ) , eyes open and following any commands , mouth open     # Acute hypoxic respiratory failure secondary to likely aspiration     S/P  5 days  of Zosyn.  SPO2 stable      cont to monitor      Mild leucocytosis with no fever --> will repeat in AM ( 1/11) , no fever , will continue to monitor   #  Dysphagia with severe malnutrition      Feeding  via NGT      Pt is not alert enough for oral feeding      Unsure if oral intake will be enough to satisfy caloric needs     GOC with family by palliative care and the medical residents and HCP opts for PEG placement-->placed on 1/11  # Hypernatremia- resolved so far    CW free water boluses via NGT ( 300ml every 4 hours )   # Functional quadriplegia with multiple pressure ulcers- wound care following      cont to monitor      Dietitian is on the case   #  Hypothyroidism   Continue levothyroxine IV 56 mcg QD so far   Prognosis guarded- remains full code.     case is discussed with the resident for Team 5     Yesenia Brown   Hospitalist   available on TEAMS 68F with PMH significant for breast cancer s/p L mastectomy, TBI (s/p a fall ~2 years ago) c/b aphasia, meningioma, hypothyroidism (hx Grave's dz s/p radioactive iodine), CVA, and dysphagia who presents to [facility] ED on 12/20/2023 with no PO intake and increased lethargy.  Patient was admitted for hypernatremia 166 and UTI. Hospital course complicated by AHRF likely 2/2 to aspiration pneumonitis, pending further GOC with HCP (  )     Patient seen with her hands crossed ( intentional by patient ) , eyes open and following any commands , mouth open     # Acute hypoxic respiratory failure secondary to likely aspiration     S/P  5 days  of Zosyn.  SPO2 stable      cont to monitor      Mild leucocytosis with no fever --> will repeat in AM ( 1/11) , no fever , will continue to monitor   #  Dysphagia with severe malnutrition      Feeding  via NGT      Pt is not alert enough for oral feeding      Unsure if oral intake will be enough to satisfy caloric needs     GOC with family by palliative care and the medical residents and HCP opts for PEG placement-->placed on 1/11  # Hypernatremia- resolved so far    CW free water boluses via NGT ( 300ml every 4 hours )   # Functional quadriplegia with multiple pressure ulcers- wound care following      cont to monitor      Dietitian is on the case   #  Hypothyroidism   Continue levothyroxine IV 56 mcg QD so far   Prognosis guarded- remains full code.     case is discussed with the resident for Team 5     Yesenia Brown   Hospitalist   available on TEAMS

## 2024-01-11 NOTE — PRE PROCEDURE NOTE - PRE PROCEDURE EVALUATION
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      C Section      Abnormality, eye  h/o left eye vitrectomy      S/P D&C (status post dilation and curettage)      H/O left mastectomy        ALLERGIES:  Tapazole (Hives)    HOME MEDICATIONS:  folic acid 1 mg oral tablet: 1 tab(s) orally once a day  levothyroxine 112 mcg (0.112 mg) oral tablet: 1 tab(s) orally once a day  Multiple Vitamins oral tablet: 1 tab(s) orally once a day  Santyl 250 units/g topical ointment: Apply topically to affected area once a day  sertraline 25 mg oral tablet: 1 tab(s) orally once a day  tamoxifen 20 mg oral tablet: 1 tab(s) orally once a day    AICD/PPM: [ ] yes   [x ] no    PERTINENT LAB DATA:                        10.1   9.94  )-----------( 329      ( 11 Jan 2024 06:56 )             32.2     01-11    137  |  101  |  10  ----------------------------<  101<H>  4.3   |  26  |  0.30<L>    Ca    8.2<L>      11 Jan 2024 06:54  Phos  2.8     01-11  Mg     2.2     01-11    TPro  6.3  /  Alb  2.8<L>  /  TBili  0.5  /  DBili  x   /  AST  11  /  ALT  7<L>  /  AlkPhos  63  01-11    PT/INR - ( 11 Jan 2024 06:55 )   PT: 12.3 sec;   INR: 1.18 ratio         PTT - ( 11 Jan 2024 06:55 )  PTT:29.2 sec            PHYSICAL EXAMINATION:    T(C): 36.5  HR: 76  BP: 103/68  RR: 18  SpO2: 98%    Constitutional: NAD    Neck:  No JVD  Respiratory: rrr  Gastrointestinal: no resp distress   Extremities: bilateral LEs contracted   Neurological: A/O x 0        COMMENTS:    The patient is a suitable candidate for the planned procedure unless box checked [ ]  No, explain:

## 2024-01-11 NOTE — PROGRESS NOTE ADULT - SUBJECTIVE AND OBJECTIVE BOX
[facility]-- WOUND TEAM -- FOLLOW UP NOTE  --------------------------------------------------------------------------------    24 hour events/subjective:    febrile  tolerating TF  incontinent  no excess drainage or odor noted        Diet:  Diet, NPO after Midnight:      NPO Start Date: 10-Angel-2024,   NPO Start Time: 23:59 (01-10-24 @ 18:25)  Diet, Pureed:   Moderately Thick Liquids (MODTHICKLIQS)  Tube Feeding Modality: Nasogastric  Glucerna 1.2 Vidal (GLUCERNARTH)  Total Volume for 24 Hours (mL): 1320  Continuous  Starting Tube Feed Rate mL per Hour: 55  Until Goal Tube Feed Rate (mL per Hour): 55  Tube Feed Duration (in Hours): 24  Tube Feed Start Time: 15:15  Carlos(7 Gm Arginine/7 Gm Glut/1.2 Gm HMB     Qty per Day:  2 (01-08-24 @ 16:24)      ROS: pt unable to offer    ALLERGIES & MEDICATIONS  --------------------------------------------------------------------------------  Allergies  Tapazole (Hives)      STANDING INPATIENT MEDICATIONS  ascorbic acid 500 milliGRAM(s) Oral daily  chlorhexidine 2% Cloths 1 Application(s) Topical daily  collagenase Ointment 1 Application(s) Topical daily  enoxaparin Injectable 40 milliGRAM(s) SubCutaneous every 24 hours  folic acid 1 milliGRAM(s) Oral daily  levothyroxine Injectable 56 MICROGram(s) IV Push at bedtime  multivitamin 1 Tablet(s) Oral daily  pantoprazole   Suspension 40 milliGRAM(s) Oral daily  polyethylene glycol 3350 17 Gram(s) Oral daily  senna 2 Tablet(s) Oral at bedtime  sertraline 25 milliGRAM(s) Oral daily  sodium chloride 0.9%. 500 milliLiter(s) IV Continuous <Continuous>  thiamine 100 milliGRAM(s) Oral daily            VITALS/PHYSICAL EXAM  --------------------------------------------------------------------------------  T(C): 36.6 (01-11-24 @ 13:12), Max: 36.7 (01-11-24 @ 00:07)  HR: 81 (01-11-24 @ 14:30) (75 - 85)  BP: 119/64 (01-11-24 @ 14:30) (86/54 - 119/64)  RR: 19 (01-11-24 @ 14:30) (15 - 20)  SpO2: 96% (01-11-24 @ 14:30) (95% - 99%)  Wt(kg): --  Height (cm): 160 (01-11-24 @ 13:12)  Weight (kg): 40.8 (01-11-24 @ 13:12)  BMI (kg/m2): 15.9 (01-11-24 @ 13:12)  BSA (m2): 1.38 (01-11-24 @ 13:12)    NAD, lethargic, cachectic, frail  WD/ WN/  WG  Versa Care P500  bed  HEENT:  NC/AT, sclera clear, mucosa moist, throat clear, trachea midline, neck supple  Respiratory: nonlabored w/ equal chest rise  Gastrointestinal: soft NT/ND   Neurology:  nonverbal, no follow commands, paraplegic  Psych: calm/ appropriate  Musculoskeletal: stiff pROM, w/ contractures  Vascular: BLE equally warn,  no cyanosis, clubbing, nor acute ischemia         BLE edema equal         BLE DP pulses palpable  Skin:  thin, dry, pale, frail,  ecchymosis w/o hematoma  lower buttocks w/ hyperpigmentation of incontinence   left foot DTI (referred to podiatry)  left trochanter (HIP) unstageable pressure injury  slough  0.5 cm x 1.5 cm  x 0.1cm  left posterior shoulder hypo/ hyperpigmented skin    resolving pressure injury   Right (hip)  stage 4 pressure injury  with 2 wounds closely abutting -   more anterior wound w/  a grey soft lifting eschar and      posteriorly an area of eschar w/ liquefaction necrosis    12cm x 10cm X 1cm   Procedure Note  Using aseptic technique anterior hip wound underwent selective excisional debridement using a scissor and forceps through necrotic/ non viable dermis into subcutaneous tissue and hip muscle.  Pt tolerated procedure well.  Hemostasis was maintained throughout.  Debulking debridement of necrotic tissue. Post measurements 12cm x 10cm x 1cm w/ undermining greatest at 9:00 of 4cm   Right upper buttock unstageable pressure  injury   5 cm  x 5 cm X 1cm   lifting soft grey eschar   sacrum stage 4 pressure injury    w/ lifting soft grey/black eschar w/ hyperpigmented periwound skin    8cm x 6cm x 1cm.  Wound w/ increase drainage liquefaction necrosis  thoracic spine unstageable pressure injury    dried black eschar     12cm x 3cm  no new blistering  or drainage  No odor, erythema, increased warmth, tenderness, induration, fluctuance, nor crepitus      LABS/ CULTURES/ RADIOLOGY:              10.1   9.94  >-----------<  329      [01-11-24 @ 06:56]              32.2     137  |  101  |  10  ----------------------------<  101      [01-11-24 @ 06:54]  4.3   |  26  |  0.30        Ca     8.2     [01-11-24 @ 06:54]      Mg     2.2     [01-11-24 @ 06:54]      Phos  2.8     [01-11-24 @ 06:54]    TPro  6.3  /  Alb  2.8  /  TBili  0.5  /  DBili  x   /  AST  11  /  ALT  7   /  AlkPhos  63  [01-11-24 @ 06:54]    PT/INR: PT 12.3 , INR 1.18       [01-11-24 @ 06:55]  PTT: 29.2       [01-11-24 @ 06:55]      CAPILLARY BLOOD GLUCOSE  POCT Blood Glucose.: 114 mg/dL (11 Jan 2024 06:09)  POCT Blood Glucose.: 130 mg/dL (11 Jan 2024 00:40)  POCT Blood Glucose.: 163 mg/dL (10 Angel 2024 17:48)    A1C with Estimated Average Glucose Result: 5.7 % (09-19-23 @ 10:33)   [facility]-- WOUND TEAM -- FOLLOW UP NOTE  --------------------------------------------------------------------------------    24 hour events/subjective:    febrile  tolerating TF  incontinent  no excess drainage or odor noted        Diet:  Diet, NPO after Midnight:      NPO Start Date: 10-Angel-2024,   NPO Start Time: 23:59 (01-10-24 @ 18:25)  Diet, Pureed:   Moderately Thick Liquids (MODTHICKLIQS)  Tube Feeding Modality: Nasogastric  Glucerna 1.2 Vidal (GLUCERNARTH)  Total Volume for 24 Hours (mL): 1320  Continuous  Starting Tube Feed Rate mL per Hour: 55  Until Goal Tube Feed Rate (mL per Hour): 55  Tube Feed Duration (in Hours): 24  Tube Feed Start Time: 15:15  Carlos(7 Gm Arginine/7 Gm Glut/1.2 Gm HMB     Qty per Day:  2 (01-08-24 @ 16:24)      ROS: pt unable to offer    ALLERGIES & MEDICATIONS  --------------------------------------------------------------------------------  Allergies  Tapazole (Hives)      STANDING INPATIENT MEDICATIONS  ascorbic acid 500 milliGRAM(s) Oral daily  chlorhexidine 2% Cloths 1 Application(s) Topical daily  collagenase Ointment 1 Application(s) Topical daily  enoxaparin Injectable 40 milliGRAM(s) SubCutaneous every 24 hours  folic acid 1 milliGRAM(s) Oral daily  levothyroxine Injectable 56 MICROGram(s) IV Push at bedtime  multivitamin 1 Tablet(s) Oral daily  pantoprazole   Suspension 40 milliGRAM(s) Oral daily  polyethylene glycol 3350 17 Gram(s) Oral daily  senna 2 Tablet(s) Oral at bedtime  sertraline 25 milliGRAM(s) Oral daily  sodium chloride 0.9%. 500 milliLiter(s) IV Continuous <Continuous>  thiamine 100 milliGRAM(s) Oral daily            VITALS/PHYSICAL EXAM  --------------------------------------------------------------------------------  T(C): 36.6 (01-11-24 @ 13:12), Max: 36.7 (01-11-24 @ 00:07)  HR: 81 (01-11-24 @ 14:30) (75 - 85)  BP: 119/64 (01-11-24 @ 14:30) (86/54 - 119/64)  RR: 19 (01-11-24 @ 14:30) (15 - 20)  SpO2: 96% (01-11-24 @ 14:30) (95% - 99%)  Wt(kg): --  Height (cm): 160 (01-11-24 @ 13:12)  Weight (kg): 40.8 (01-11-24 @ 13:12)  BMI (kg/m2): 15.9 (01-11-24 @ 13:12)  BSA (m2): 1.38 (01-11-24 @ 13:12)    NAD, lethargic, cachectic, frail  WD/ WN/  WG  Versa Care P500  bed  HEENT:  NC/AT, sclera clear, mucosa moist, throat clear, trachea midline, neck supple  Respiratory: nonlabored w/ equal chest rise  Gastrointestinal: soft NT/ND   Neurology:  nonverbal, no follow commands, paraplegic  Psych: calm/ appropriate  Musculoskeletal: stiff pROM, w/ contractures  Vascular: BLE equally warn,  no cyanosis, clubbing, nor acute ischemia         BLE edema equal         BLE DP pulses palpable  Skin:  thin, dry, pale, frail,  ecchymosis w/o hematoma  lower buttocks w/ hyperpigmentation of incontinence   left foot DTI (referred to podiatry)  left trochanter (HIP) unstageable pressure injury  slough  0.5 cm x 1.5 cm  x 0.1cm  left posterior shoulder hypo/ hyperpigmented skin    resolving pressure injury   Right (hip)  stage 4 pressure injury  with 2 wounds closely abutting -   more anterior wound w/  a grey soft lifting eschar and      posteriorly an area of eschar w/ liquefaction necrosis    12cm x 10cm X 1cm   Procedure Note  Using aseptic technique anterior hip wound underwent selective excisional debridement using a scissor and forceps through necrotic/ non viable dermis into subcutaneous tissue and hip muscle.  Pt tolerated procedure well.  Hemostasis was maintained throughout.  Debulking debridement of necrotic tissue. Post measurements 12cm x 10cm x 1cm w/ undermining greatest at 9:00 of 4cm   Right upper buttock unstageable pressure  injury   5 cm  x 5 cm X 1cm   lifting soft grey eschar   sacrum stage 4 pressure injury    w/ lifting soft grey/black eschar w/ hyperpigmented periwound skin    8cm x 6cm x 1cm.  Wound w/ increase drainage liquefaction necrosis  thoracic spine unstageable pressure injury    dried black eschar     12cm x 3cm  no new blistering  or drainage  No odor, erythema, increased warmth, tenderness, induration, fluctuance, nor crepitus      LABS/ CULTURES/ RADIOLOGY:              10.1   9.94  >-----------<  329      [01-11-24 @ 06:56]              32.2     137  |  101  |  10  ----------------------------<  101      [01-11-24 @ 06:54]  4.3   |  26  |  0.30        Ca     8.2     [01-11-24 @ 06:54]      Mg     2.2     [01-11-24 @ 06:54]      Phos  2.8     [01-11-24 @ 06:54]    TPro  6.3  /  Alb  2.8  /  TBili  0.5  /  DBili  x   /  AST  11  /  ALT  7   /  AlkPhos  63  [01-11-24 @ 06:54]    PT/INR: PT 12.3 , INR 1.18       [01-11-24 @ 06:55]  PTT: 29.2       [01-11-24 @ 06:55]      CAPILLARY BLOOD GLUCOSE  POCT Blood Glucose.: 114 mg/dL (11 Jan 2024 06:09)  POCT Blood Glucose.: 130 mg/dL (11 Jan 2024 00:40)  POCT Blood Glucose.: 163 mg/dL (10 Angel 2024 17:48)    A1C with Estimated Average Glucose Result: 5.7 % (09-19-23 @ 10:33)

## 2024-01-11 NOTE — PROGRESS NOTE ADULT - ASSESSMENT
68F with PMH significant for breast cancer s/p L mastectomy, TBI (s/p a fall ~2 years ago) c/b aphasia, meningioma, hypothyroidism (hx Grave's dz s/p radioactive iodine), CVA, and dysphagia who presents to [facility] ED on 12/20/2023 with no PO intake and increased lethargy in the last 2-3 days. Found to have significant hypernatremia to 166 (checking q6h), positive UA with borderline leukocytosis with PMN predominance. Pending culture results. RVP and CTH negative. Afebrile, on RA, hypotensive to 90/60 s/p 1L IVF bolus. Physical exam notable for encephalopathy AOx0 and non-verbal, cachexia and b/l upper/lower ext contracture, and multiple pressure ulcers of varying stages (I-III).      Wound Consult requested to assist w/ management of  Sacral/ Buttocks /rt  hip stage 4 pressure injury  left hip/spine  unstageable pressure injury  Lt Shoulder DTI resolving  Incontinence Associated Dermatitis  Lt foot DTI (referred to podiatry)    Rt Hip & Sacrum - pack w/ 1/4 strength Dakins dressing TID     Betadine to dried intact blister  Buttocks - Medihoney dressing QD  Lt Hip medihoney dressing QD  Spine - Betadine QD  Buttocks CAVILON Advance TIW and pericare BID and prn soiling        Continue w/ attends under pads and Pericare as per protocol  Lt shoulder- Allevyn QD  Left foot DTI--Betadine while awaiting podiatry  Appreciate Palliative & GOC  Abx per Medicine  Ongoing GOC, appreciate Palliative Consult  Moisturize intact skin w/ SWEEN cream BID  Nutrition optimization Severe protein-calorie malnutrition       high quality protein TF, mor/ prosource, MVI & Vit C to promote wound healing       Appreciate RD & S&S input  Continue turning and positioning w/ offloading assistive devices as per protocol  Waffle Cushion to chair when oob to chair  Continue w/ low air loss pressure redistribution bed surface   Pt will need Group 2 mattress on hospital bed and ROHO cushion for wheel chair upon discharge home  Care as per medicine, will follow w/ you  Upon [facility] [phone]  D/w team & RN & s/w attng  Thank you for this consult  Codi Thompson PA-C CWS [id number]  Nights/ Weekends/ Holidays please call:  General Surgery Consult pager ([phone]) for emergencies  Wound PT for multilayer leg wrapping or VAC issues ([phone])   I spent 50 minutes face to face w/ this pt of which more than 50% of the time was spent counseling & coordinating care of this pt.      68F with PMH significant for breast cancer s/p L mastectomy, TBI (s/p a fall ~2 years ago) c/b aphasia, meningioma, hypothyroidism (hx Grave's dz s/p radioactive iodine), CVA, and dysphagia who presents to [facility] ED on 12/20/2023 with no PO intake and increased lethargy in the last 2-3 days. Found to have significant hypernatremia to 166 (checking q6h), positive UA with borderline leukocytosis with PMN predominance. Pending culture results. RVP and CTH negative. Afebrile, on RA, hypotensive to 90/60 s/p 1L IVF bolus. Physical exam notable for encephalopathy AOx0 and non-verbal, cachexia and b/l upper/lower ext contracture, and multiple pressure ulcers of varying stages (I-III).      Wound Consult requested to assist w/ management of  Sacral/ Buttocks /rt  hip stage 4 pressure injury  left hip/spine  unstageable pressure injury  Lt Shoulder DTI resolving  Incontinence Associated Dermatitis  Lt foot DTI (referred to podiatry)    Rt Hip & Sacrum - pack w/ 1/4 strength Dakins dressing TID     Betadine to dried intact blister  Buttocks - Medihoney dressing QD  Lt Hip medihoney dressing QD  Spine - Betadine QD  Buttocks CAVILON Advance TIW and pericare BID and prn soiling        Continue w/ attends under pads and Pericare as per protocol  Lt shoulder- Allevyn QD  Left foot DTI--Betadine while awaiting podiatry  Appreciate Palliative & GOC  Abx per Medicine  Ongoing GOC, appreciate Palliative Consult  Moisturize intact skin w/ SWEEN cream BID  Nutrition optimization Severe protein-calorie malnutrition       high quality protein TF, mor/ prosource, MVI & Vit C to promote wound healing       Appreciate RD & S&S input  Continue turning and positioning w/ offloading assistive devices as per protocol  Waffle Cushion to chair when oob to chair  Continue w/ low air loss pressure redistribution bed surface   Pt will need Group 2 mattress on hospital bed and ROHO cushion for wheel chair upon discharge home  Care as per medicine, will follow w/ you  Upon discharge f/u as outpatient at [facility] [phone]  D/w team & RN & s/w attng  Thank you for this consult  Codi Thompson PA-C CWS [id number]  Nights/ Weekends/ Holidays please call:  General Surgery Consult pager ([phone]) for emergencies  Wound PT for multilayer leg wrapping or VAC issues ([phone])   I spent 50 minutes face to face w/ this pt of which more than 50% of the time was spent counseling & coordinating care of this pt.

## 2024-01-12 LAB
ALBUMIN SERPL ELPH-MCNC: 2.9 G/DL — LOW (ref 3.3–5)
ALBUMIN SERPL ELPH-MCNC: 2.9 G/DL — LOW (ref 3.3–5)
ALP SERPL-CCNC: 60 U/L — SIGNIFICANT CHANGE UP (ref 40–120)
ALP SERPL-CCNC: 60 U/L — SIGNIFICANT CHANGE UP (ref 40–120)
ALT FLD-CCNC: 8 U/L — LOW (ref 10–45)
ALT FLD-CCNC: 8 U/L — LOW (ref 10–45)
ANION GAP SERPL CALC-SCNC: 11 MMOL/L — SIGNIFICANT CHANGE UP (ref 5–17)
ANION GAP SERPL CALC-SCNC: 11 MMOL/L — SIGNIFICANT CHANGE UP (ref 5–17)
AST SERPL-CCNC: 9 U/L — LOW (ref 10–40)
AST SERPL-CCNC: 9 U/L — LOW (ref 10–40)
BILIRUB SERPL-MCNC: 0.7 MG/DL — SIGNIFICANT CHANGE UP (ref 0.2–1.2)
BILIRUB SERPL-MCNC: 0.7 MG/DL — SIGNIFICANT CHANGE UP (ref 0.2–1.2)
BUN SERPL-MCNC: 13 MG/DL — SIGNIFICANT CHANGE UP (ref 7–23)
BUN SERPL-MCNC: 13 MG/DL — SIGNIFICANT CHANGE UP (ref 7–23)
CALCIUM SERPL-MCNC: 8.6 MG/DL — SIGNIFICANT CHANGE UP (ref 8.4–10.5)
CALCIUM SERPL-MCNC: 8.6 MG/DL — SIGNIFICANT CHANGE UP (ref 8.4–10.5)
CHLORIDE SERPL-SCNC: 103 MMOL/L — SIGNIFICANT CHANGE UP (ref 96–108)
CHLORIDE SERPL-SCNC: 103 MMOL/L — SIGNIFICANT CHANGE UP (ref 96–108)
CO2 SERPL-SCNC: 24 MMOL/L — SIGNIFICANT CHANGE UP (ref 22–31)
CO2 SERPL-SCNC: 24 MMOL/L — SIGNIFICANT CHANGE UP (ref 22–31)
CREAT SERPL-MCNC: 0.37 MG/DL — LOW (ref 0.5–1.3)
CREAT SERPL-MCNC: 0.37 MG/DL — LOW (ref 0.5–1.3)
EGFR: 108 ML/MIN/1.73M2 — SIGNIFICANT CHANGE UP
EGFR: 108 ML/MIN/1.73M2 — SIGNIFICANT CHANGE UP
GLUCOSE BLDC GLUCOMTR-MCNC: 92 MG/DL — SIGNIFICANT CHANGE UP (ref 70–99)
GLUCOSE BLDC GLUCOMTR-MCNC: 92 MG/DL — SIGNIFICANT CHANGE UP (ref 70–99)
GLUCOSE BLDC GLUCOMTR-MCNC: 96 MG/DL — SIGNIFICANT CHANGE UP (ref 70–99)
GLUCOSE BLDC GLUCOMTR-MCNC: 96 MG/DL — SIGNIFICANT CHANGE UP (ref 70–99)
GLUCOSE BLDC GLUCOMTR-MCNC: 98 MG/DL — SIGNIFICANT CHANGE UP (ref 70–99)
GLUCOSE BLDC GLUCOMTR-MCNC: 98 MG/DL — SIGNIFICANT CHANGE UP (ref 70–99)
GLUCOSE SERPL-MCNC: 87 MG/DL — SIGNIFICANT CHANGE UP (ref 70–99)
GLUCOSE SERPL-MCNC: 87 MG/DL — SIGNIFICANT CHANGE UP (ref 70–99)
HCT VFR BLD CALC: 31.4 % — LOW (ref 34.5–45)
HCT VFR BLD CALC: 31.4 % — LOW (ref 34.5–45)
HGB BLD-MCNC: 9.8 G/DL — LOW (ref 11.5–15.5)
HGB BLD-MCNC: 9.8 G/DL — LOW (ref 11.5–15.5)
MAGNESIUM SERPL-MCNC: 2.3 MG/DL — SIGNIFICANT CHANGE UP (ref 1.6–2.6)
MAGNESIUM SERPL-MCNC: 2.3 MG/DL — SIGNIFICANT CHANGE UP (ref 1.6–2.6)
MCHC RBC-ENTMCNC: 26.9 PG — LOW (ref 27–34)
MCHC RBC-ENTMCNC: 26.9 PG — LOW (ref 27–34)
MCHC RBC-ENTMCNC: 31.2 GM/DL — LOW (ref 32–36)
MCHC RBC-ENTMCNC: 31.2 GM/DL — LOW (ref 32–36)
MCV RBC AUTO: 86.3 FL — SIGNIFICANT CHANGE UP (ref 80–100)
MCV RBC AUTO: 86.3 FL — SIGNIFICANT CHANGE UP (ref 80–100)
NRBC # BLD: 0 /100 WBCS — SIGNIFICANT CHANGE UP (ref 0–0)
NRBC # BLD: 0 /100 WBCS — SIGNIFICANT CHANGE UP (ref 0–0)
PHOSPHATE SERPL-MCNC: 3.1 MG/DL — SIGNIFICANT CHANGE UP (ref 2.5–4.5)
PHOSPHATE SERPL-MCNC: 3.1 MG/DL — SIGNIFICANT CHANGE UP (ref 2.5–4.5)
PLATELET # BLD AUTO: 322 K/UL — SIGNIFICANT CHANGE UP (ref 150–400)
PLATELET # BLD AUTO: 322 K/UL — SIGNIFICANT CHANGE UP (ref 150–400)
POTASSIUM SERPL-MCNC: 4.3 MMOL/L — SIGNIFICANT CHANGE UP (ref 3.5–5.3)
POTASSIUM SERPL-MCNC: 4.3 MMOL/L — SIGNIFICANT CHANGE UP (ref 3.5–5.3)
POTASSIUM SERPL-SCNC: 4.3 MMOL/L — SIGNIFICANT CHANGE UP (ref 3.5–5.3)
POTASSIUM SERPL-SCNC: 4.3 MMOL/L — SIGNIFICANT CHANGE UP (ref 3.5–5.3)
PROT SERPL-MCNC: 6.1 G/DL — SIGNIFICANT CHANGE UP (ref 6–8.3)
PROT SERPL-MCNC: 6.1 G/DL — SIGNIFICANT CHANGE UP (ref 6–8.3)
RBC # BLD: 3.64 M/UL — LOW (ref 3.8–5.2)
RBC # BLD: 3.64 M/UL — LOW (ref 3.8–5.2)
RBC # FLD: 17.3 % — HIGH (ref 10.3–14.5)
RBC # FLD: 17.3 % — HIGH (ref 10.3–14.5)
SODIUM SERPL-SCNC: 138 MMOL/L — SIGNIFICANT CHANGE UP (ref 135–145)
SODIUM SERPL-SCNC: 138 MMOL/L — SIGNIFICANT CHANGE UP (ref 135–145)
WBC # BLD: 9.36 K/UL — SIGNIFICANT CHANGE UP (ref 3.8–10.5)
WBC # BLD: 9.36 K/UL — SIGNIFICANT CHANGE UP (ref 3.8–10.5)
WBC # FLD AUTO: 9.36 K/UL — SIGNIFICANT CHANGE UP (ref 3.8–10.5)
WBC # FLD AUTO: 9.36 K/UL — SIGNIFICANT CHANGE UP (ref 3.8–10.5)

## 2024-01-12 PROCEDURE — 99233 SBSQ HOSP IP/OBS HIGH 50: CPT | Mod: GC

## 2024-01-12 PROCEDURE — 99232 SBSQ HOSP IP/OBS MODERATE 35: CPT | Mod: GC

## 2024-01-12 RX ORDER — PANTOPRAZOLE SODIUM 20 MG/1
40 TABLET, DELAYED RELEASE ORAL DAILY
Refills: 0 | Status: DISCONTINUED | OUTPATIENT
Start: 2024-01-12 | End: 2024-01-18

## 2024-01-12 RX ORDER — SERTRALINE 25 MG/1
25 TABLET, FILM COATED ORAL DAILY
Refills: 0 | Status: DISCONTINUED | OUTPATIENT
Start: 2024-01-12 | End: 2024-01-18

## 2024-01-12 RX ORDER — ASCORBIC ACID 60 MG
500 TABLET,CHEWABLE ORAL DAILY
Refills: 0 | Status: DISCONTINUED | OUTPATIENT
Start: 2024-01-12 | End: 2024-01-18

## 2024-01-12 RX ORDER — THIAMINE MONONITRATE (VIT B1) 100 MG
100 TABLET ORAL DAILY
Refills: 0 | Status: DISCONTINUED | OUTPATIENT
Start: 2024-01-12 | End: 2024-01-18

## 2024-01-12 RX ORDER — SENNA PLUS 8.6 MG/1
2 TABLET ORAL AT BEDTIME
Refills: 0 | Status: DISCONTINUED | OUTPATIENT
Start: 2024-01-12 | End: 2024-01-18

## 2024-01-12 RX ORDER — FOLIC ACID 0.8 MG
1 TABLET ORAL DAILY
Refills: 0 | Status: DISCONTINUED | OUTPATIENT
Start: 2024-01-12 | End: 2024-01-18

## 2024-01-12 RX ORDER — POLYETHYLENE GLYCOL 3350 17 G/17G
17 POWDER, FOR SOLUTION ORAL DAILY
Refills: 0 | Status: DISCONTINUED | OUTPATIENT
Start: 2024-01-12 | End: 2024-01-18

## 2024-01-12 RX ORDER — SODIUM CHLORIDE 9 MG/ML
500 INJECTION, SOLUTION INTRAVENOUS
Refills: 0 | Status: DISCONTINUED | OUTPATIENT
Start: 2024-01-12 | End: 2024-01-14

## 2024-01-12 RX ADMIN — SODIUM CHLORIDE 100 MILLILITER(S): 9 INJECTION, SOLUTION INTRAVENOUS at 10:32

## 2024-01-12 RX ADMIN — Medication 1 APPLICATION(S): at 21:30

## 2024-01-12 RX ADMIN — CHLORHEXIDINE GLUCONATE 1 APPLICATION(S): 213 SOLUTION TOPICAL at 13:36

## 2024-01-12 RX ADMIN — Medication 1 APPLICATION(S): at 13:38

## 2024-01-12 RX ADMIN — Medication 56 MICROGRAM(S): at 21:30

## 2024-01-12 RX ADMIN — Medication 1 APPLICATION(S): at 04:09

## 2024-01-12 RX ADMIN — Medication 500 MILLIGRAM(S): at 13:37

## 2024-01-12 RX ADMIN — POLYETHYLENE GLYCOL 3350 17 GRAM(S): 17 POWDER, FOR SOLUTION ORAL at 13:36

## 2024-01-12 RX ADMIN — Medication 1 TABLET(S): at 13:36

## 2024-01-12 RX ADMIN — Medication 100 MILLIGRAM(S): at 13:37

## 2024-01-12 RX ADMIN — ENOXAPARIN SODIUM 40 MILLIGRAM(S): 100 INJECTION SUBCUTANEOUS at 14:58

## 2024-01-12 RX ADMIN — Medication 1 MILLIGRAM(S): at 13:37

## 2024-01-12 RX ADMIN — PANTOPRAZOLE SODIUM 40 MILLIGRAM(S): 20 TABLET, DELAYED RELEASE ORAL at 13:36

## 2024-01-12 RX ADMIN — SERTRALINE 25 MILLIGRAM(S): 25 TABLET, FILM COATED ORAL at 13:37

## 2024-01-12 RX ADMIN — SENNA PLUS 2 TABLET(S): 8.6 TABLET ORAL at 21:30

## 2024-01-12 RX ADMIN — Medication 1 APPLICATION(S): at 13:35

## 2024-01-12 NOTE — PROGRESS NOTE ADULT - ASSESSMENT
68 year old female with history of breast cancer s/p L mastectomy, TBI (s/p a fall ~2 years ago) c/b aphasia, meningioma, hypothyroidism (hx Grave's dz s/p radioactive iodine), CVA, and dysphagia who presents to [facility] ED with increased lethargy and failure to thrive with initial sodium 166 [decreased to 141 within 48 hours].  Per son upon admission, patient developed inability to speak or tolerate food.  Hospital course c/b acute hypoxic respiratory failure and possible aspiration pneumonitis/pneumonia.      # Evaluation of PEG  # Hypernatremia, improved  # Acute hypoxic respiratory failure, improving  # Aspiration pneumonia  -PEG successfully placed endoscopically on 1/11/2024    Recommendations:  -may administer enteral feeds via PEG today   -Nutrition consult for recommendations on enteral feeds  -keep bumper clean and dry, would avoid gauze between the PEG bumper and skin  -maintain aspiration precautions and elevate head of bed during feeds  -close observation to prevent PEG dislodgement, would recommend abdominal binder for protection/prevention of PEG dislodgment  -eventual Speech and Swallow evaluation as an outpatient to evaluate for swallowing; would not be a candidate for PEG removal until at least 6-8 weeks post placement     Mika Franklin  GI/Hepatology Fellow    MONDAY-FRIDAY 8AM-5PM:  Pager# [phone] ([facility]) or [phone] ([facility])    NON-URGENT CONSULTS:  Please email [email or web address] OR [email or web address]  AT NIGHT AND ON WEEKENDS:  Contact on-call GI fellow via answering service ([phone]) from 5pm-8am and on weekends/holidays.   68 year old female with history of breast cancer s/p L mastectomy, TBI (s/p a fall ~2 years ago) c/b aphasia, meningioma, hypothyroidism (hx Grave's dz s/p radioactive iodine), CVA, and dysphagia who presents to [facility] ED with increased lethargy and failure to thrive with initial sodium 166 [decreased to 141 within 48 hours].  Per son upon admission, patient developed inability to speak or tolerate food.  Hospital course c/b acute hypoxic respiratory failure and possible aspiration pneumonitis/pneumonia.      # Evaluation of PEG  # Hypernatremia, improved  # Acute hypoxic respiratory failure, improving  # Aspiration pneumonia  -PEG successfully placed endoscopically on 1/11/2024    Recommendations:  -may administer enteral feeds via PEG today   -Nutrition consult for recommendations on enteral feeds  -keep bumper clean and dry, would avoid gauze between the PEG bumper and skin  -maintain aspiration precautions and elevate head of bed during feeds  -close observation to prevent PEG dislodgement, would recommend abdominal binder for protection/prevention of PEG dislodgment  -eventual Speech and Swallow evaluation as an outpatient to evaluate for swallowing; would not be a candidate for PEG removal until at least 6-8 weeks post placement     Mika Franklin  GI/Hepatology Fellow    MONDAY-FRIDAY 8AM-5PM:  Pager# [phone] ([facility]) or [phone] ([facility])    NON-URGENT CONSULTS:  Please email [email or web address] OR [email or web address]  AT NIGHT AND ON WEEKENDS:  Contact on-call GI fellow via answering service ([phone]) from 5pm-8am and on weekends/holidays.

## 2024-01-12 NOTE — PROGRESS NOTE ADULT - SUBJECTIVE AND OBJECTIVE BOX
Interval Events:   No acute events noted following PEG, however unable to obtain full HPI due to underlying mental status.    ROS:   Unable to fully obtain ROS.    Hospital Medications:  ascorbic acid 500 milliGRAM(s) Oral daily  chlorhexidine 2% Cloths 1 Application(s) Topical daily  Dakins Solution - 1/4 Strength 1 Application(s) Topical every 8 hours  enoxaparin Injectable 40 milliGRAM(s) SubCutaneous every 24 hours  folic acid 1 milliGRAM(s) Oral daily  levothyroxine Injectable 56 MICROGram(s) IV Push at bedtime  multivitamin 1 Tablet(s) Oral daily  pantoprazole   Suspension 40 milliGRAM(s) Oral daily  polyethylene glycol 3350 17 Gram(s) Oral daily  povidone iodine 10% Solution 1 Application(s) Topical daily  senna 2 Tablet(s) Oral at bedtime  sertraline 25 milliGRAM(s) Oral daily  sodium chloride 0.9%. 500 milliLiter(s) IV Continuous <Continuous>  thiamine 100 milliGRAM(s) Oral daily      PHYSICAL EXAM:   Vital Signs:  Vital Signs Last 24 Hrs  T(C): 36.4 (12 Jan 2024 05:11), Max: 36.6 (11 Jan 2024 12:13)  T(F): 97.6 (12 Jan 2024 05:11), Max: 97.9 (11 Jan 2024 12:13)  HR: 82 (12 Jan 2024 05:11) (75 - 85)  BP: 99/62 (12 Jan 2024 05:11) (86/54 - 122/78)  BP(mean): --  RR: 18 (12 Jan 2024 05:11) (15 - 20)  SpO2: 100% (12 Jan 2024 05:11) (94% - 100%)    Parameters below as of 12 Jan 2024 05:11  Patient On (Oxygen Delivery Method): nasal cannula  O2 Flow (L/min): 1    Daily Height in cm: 160.02 (11 Jan 2024 13:12)    Daily     GENERAL: no acute distress  NEURO: not fully alert/oriented, contracted  HEENT: NCAT, no conjunctival pallor appreciated  CHEST: no respiratory distress, no accessory muscle use  CARDIAC: regular rate, +S1/S2  ABDOMEN: PEG in place c/d/i, soft, nontender, no rebound or guarding  EXTREMITIES: warm, well perfused  SKIN: no lesions noted    LABS: reviewed                        10.1   9.94  )-----------( 329      ( 11 Jan 2024 06:56 )             32.2     01-11    137  |  101  |  10  ----------------------------<  101<H>  4.3   |  26  |  0.30<L>    Ca    8.2<L>      11 Jan 2024 06:54  Phos  2.8     01-11  Mg     2.2     01-11    TPro  6.3  /  Alb  2.8<L>  /  TBili  0.5  /  DBili  x   /  AST  11  /  ALT  7<L>  /  AlkPhos  63  01-11    LIVER FUNCTIONS - ( 11 Jan 2024 06:54 )  Alb: 2.8 g/dL / Pro: 6.3 g/dL / ALK PHOS: 63 U/L / ALT: 7 U/L / AST: 11 U/L / GGT: x             Interval Diagnostic Studies: see sunrise for full report

## 2024-01-12 NOTE — PROGRESS NOTE ADULT - PROBLEM SELECTOR PLAN 1
Hx of dysphagia since 2-3 months ago per son. Notable for severe b/l ext contracture and cachexia. Low PO intake at baseline, no PO intake in the last 2-3 days. NGT placed 12/22, subsequent removals and replacements with most recent placed on 1/7.  SLP cleared patient for Puree/moderately thick liquids via TSP amount only only when the patient is awake and alert; however pt unlikely to get adequate calories.  understands risks of aspiration but would like to attempt feeding.  GI consulted for PEG, ongoing GOC on final decision from family.   would like to explore any reversible causes prior to PEG placement.  However discussed w/  worsening PO intake and dysphagia likely 2/2 to progression of disease, unlikely reversible.   would like to try boosting her caloric intake with tube feed supplementation to see if that improves her ability to tolerate food.   would like to try PO feeding with TSP is patient tolerates, understands and accepts risk of aspiration.  After patient had not had much recovery in functional status with feeding, palliative care discussion occurred on 1/10 with  requesting PEG placement after risks and benefits were discussed.   would ultimately desire that patient regains some functional capacity, and would consider taking patient home with health aid or rehabilitation as well.  - Continue tube feeds   - Will attempt PO feeding with TSP so long as patient is awake and alert  - Calorie count  - Palliative care consulted, appreciate recs  - Will attempt FEESST if patient tolerates  - GI consulted regarding PEG, likely place 1/11 Hx of dysphagia since 2-3 months ago per son. Notable for severe b/l ext contracture and cachexia. Low PO intake at baseline, no PO intake in the last 2-3 days. NGT placed 12/22, subsequent removals and replacements with most recent placed on 1/7.  SLP cleared patient for Puree/moderately thick liquids via TSP amount only only when the patient is awake and alert; however pt unlikely to get adequate calories.  understands risks of aspiration but would like to attempt feeding.  GI consulted for PEG, ongoing GOC on final decision from family.   would like to explore any reversible causes prior to PEG placement.  However discussed w/  worsening PO intake and dysphagia likely 2/2 to progression of disease, unlikely reversible.   would like to try boosting her caloric intake with tube feed supplementation to see if that improves her ability to tolerate food.   would like to try PO feeding with TSP is patient tolerates, understands and accepts risk of aspiration.  After patient had not had much recovery in functional status with feeding, palliative care discussion occurred on 1/10 with  requesting PEG placement after risks and benefits were discussed.   would ultimately desire that patient regains some functional capacity, and would consider taking patient home with health aid or rehabilitation as well.  S/p PEG 1/11, cleared by GI for use, will now hold oral feeding pending further discussions with  about pleasure feeds  - Continue tube feeds   - Glucerna 1.2 @ starting at 10ml/hr and advance by [id number] or as tolerated to goal rate of 50 mL/hr x 24hrs  - Considering transition to bolus feeds once tolerating continuous feeds at goal rate, bolus feeds of Glucerna 1.2 @ 237ml (1 can) 5x/daily  - Will hold on PO feeding with TSP  - Palliative care consulted  - Appreciate GI PEG placement Hx of dysphagia since 2-3 months ago per son. Notable for severe b/l ext contracture and cachexia. Low PO intake at baseline, no PO intake in the last 2-3 days. NGT placed 12/22, subsequent removals and replacements with most recent placed on 1/7.  SLP cleared patient for Puree/moderately thick liquids via TSP amount only only when the patient is awake and alert; however pt unlikely to get adequate calories.  understands risks of aspiration but would like to attempt feeding.  GI consulted for PEG, ongoing GOC on final decision from family.   would like to explore any reversible causes prior to PEG placement.  However discussed w/  worsening PO intake and dysphagia likely 2/2 to progression of disease, unlikely reversible.   would like to try boosting her caloric intake with tube feed supplementation to see if that improves her ability to tolerate food.   would like to try PO feeding with TSP is patient tolerates, understands and accepts risk of aspiration.  After patient had not had much recovery in functional status with feeding, palliative care discussion occurred on 1/10 with  requesting PEG placement after risks and benefits were discussed.   would ultimately desire that patient regains some functional capacity, and would consider taking patient home with health aid or rehabilitation as well.  S/p PEG 1/11, cleared by GI for use, will now hold oral feeding pending further discussions with  about pleasure feeds  - Continue tube feeds   - Glucerna 1.2 @ starting at 10ml/hr and advance by [id number] or as tolerated to goal rate of 50 mL/hr x 24hrs  - Considering transition to bolus feeds once tolerating continuous feeds at goal rate, bolus feeds of Glucerna 1.2 @ 237ml (1 can) 5x/daily  - Will hold on PO feeding with TSP  - Palliative care consulted  - Appreciate GI PEG placement

## 2024-01-12 NOTE — PROGRESS NOTE ADULT - PROBLEM SELECTOR PLAN 5
Symptoms of lethargy/encephalopathy >48h with no PO intake, likely has chronic hypernatremia (vs acute) 2/2 severe dehydration. Na 166 on admission, improved w/ D5.  Resolved   - monitor BMP daily Hx of breast cancer, s/p L mastectomy  - Holding home Tamoxifen (NPO)

## 2024-01-12 NOTE — PROGRESS NOTE ADULT - SUBJECTIVE AND OBJECTIVE BOX
***************************************************************  Bassem Hernandez, PG1  Internal Medicine   Pager:  TEAMS  ***************************************************************    JONATHAN YOUNGER  68y  MRN: [id number]    Patient is a 68y old  Female who presents with a chief complaint of No PO intake, lethargy (12 Jan 2024 08:53)      Interval/Overnight Events: no events ON.     Subjective: Pt seen and examined at bedside. S/p PEG placement.  Awakens to voice, squeezes fingers on command.  Tries to voice her name, but unsuccessful.     MEDICATIONS  (STANDING):  ascorbic acid 500 milliGRAM(s) Oral daily  chlorhexidine 2% Cloths 1 Application(s) Topical daily  Dakins Solution - 1/4 Strength 1 Application(s) Topical every 8 hours  enoxaparin Injectable 40 milliGRAM(s) SubCutaneous every 24 hours  folic acid 1 milliGRAM(s) Oral daily  lactated ringers. 500 milliLiter(s) (100 mL/Hr) IV Continuous <Continuous>  levothyroxine Injectable 56 MICROGram(s) IV Push at bedtime  multivitamin 1 Tablet(s) Oral daily  pantoprazole   Suspension 40 milliGRAM(s) Oral daily  polyethylene glycol 3350 17 Gram(s) Oral daily  povidone iodine 10% Solution 1 Application(s) Topical daily  senna 2 Tablet(s) Oral at bedtime  sertraline 25 milliGRAM(s) Oral daily  sodium chloride 0.9%. 500 milliLiter(s) (30 mL/Hr) IV Continuous <Continuous>  thiamine 100 milliGRAM(s) Oral daily    MEDICATIONS  (PRN):      Objective:    Vitals: Vital Signs Last 24 Hrs  T(C): 36.4 (01-12-24 @ 05:11), Max: 36.6 (01-11-24 @ 12:13)  T(F): 97.6 (01-12-24 @ 05:11), Max: 97.9 (01-11-24 @ 12:13)  HR: 82 (01-12-24 @ 05:11) (75 - 85)  BP: 99/62 (01-12-24 @ 05:11) (86/54 - 122/78)  BP(mean): --  RR: 18 (01-12-24 @ 05:11) (15 - 20)  SpO2: 100% (01-12-24 @ 05:11) (94% - 100%)                I&O's Summary      PHYSICAL EXAM:  GENERAL: NAD, cachetic, chronically ill appearing   HEAD:  Atraumatic, Normocephalic  EYES: PERRL, conjunctiva and sclera clear  ENMT: dry mucous membranes   NECK: Supple  CHEST/LUNG: Clear to auscultation bilaterally; No rales, rhonchi, wheezing, or rubs but difficult to assess due to contracted habitus  HEART: Regular rate and rhythm; No murmurs, rubs, or gallops  ABDOMEN: Soft, Nontender, Nondistended; Bowel sounds present  EXTREMITIES:  no clubbing, cyanosis, or edema   SKIN: No rashes or lesions  NERVOUS SYSTEM:  Alert, opens eyes to voice, contracted extremities, squeezes fingers on command  PSYCH: Unable to assess    LABS:                        9.8    9.36  )-----------( 322      ( 12 Jan 2024 07:20 )             31.4                         10.1   9.94  )-----------( 329      ( 11 Jan 2024 06:56 )             32.2     01-12    138  |  103  |  13  ----------------------------<  87  4.3   |  24  |  0.37<L>  01-11    137  |  101  |  10  ----------------------------<  101<H>  4.3   |  26  |  0.30<L>    Ca    8.6      12 Jan 2024 07:16  Ca    8.2<L>      11 Jan 2024 06:54  Phos  3.1     01-12  Mg     2.3     01-12    TPro  6.1  /  Alb  2.9<L>  /  TBili  0.7  /  DBili  x   /  AST  9<L>  /  ALT  8<L>  /  AlkPhos  60  01-12  TPro  6.3  /  Alb  2.8<L>  /  TBili  0.5  /  DBili  x   /  AST  11  /  ALT  7<L>  /  AlkPhos  63  01-11    CAPILLARY BLOOD GLUCOSE      POCT Blood Glucose.: 96 mg/dL (12 Jan 2024 05:32)  POCT Blood Glucose.: 98 mg/dL (12 Jan 2024 00:40)  POCT Blood Glucose.: 115 mg/dL (11 Jan 2024 18:45)    PT/INR - ( 11 Jan 2024 06:55 )   PT: 12.3 sec;   INR: 1.18 ratio         PTT - ( 11 Jan 2024 06:55 )  PTT:29.2 sec    Urinalysis Basic - ( 12 Jan 2024 07:16 )    Color: x / Appearance: x / SG: x / pH: x  Gluc: 87 mg/dL / Ketone: x  / Bili: x / Urobili: x   Blood: x / Protein: x / Nitrite: x   Leuk Esterase: x / RBC: x / WBC x   Sq Epi: x / Non Sq Epi: x / Bacteria: x          RADIOLOGY & ADDITIONAL TESTS:    Imaging Personally Reviewed:  [x ] YES  [ ] NO    Consultants involved in case:   Consultant(s) Notes Reviewed:  [ x] YES  [ ] NO:   Care Discussed with Consultants/Other Providers [x ] YES  [ ] NO         ***************************************************************  Bassem Hernandez, PG1  Internal Medicine   Pager:  TEAMS  ***************************************************************    JONATHAN YOUNGER  68y  MRN: [id number]    Patient is a 68y old  Female who presents with a chief complaint of No PO intake, lethargy (12 Jan 2024 08:53)      Interval/Overnight Events: no events ON.     Subjective: Pt seen and examined at bedside. S/p PEG placement.  Awakens to voice, squeezes fingers on command.  Tries to voice her name, but unsuccessful.     MEDICATIONS  (STANDING):  ascorbic acid 500 milliGRAM(s) Oral daily  chlorhexidine 2% Cloths 1 Application(s) Topical daily  Dakins Solution - 1/4 Strength 1 Application(s) Topical every 8 hours  enoxaparin Injectable 40 milliGRAM(s) SubCutaneous every 24 hours  folic acid 1 milliGRAM(s) Oral daily  lactated ringers. 500 milliLiter(s) (100 mL/Hr) IV Continuous <Continuous>  levothyroxine Injectable 56 MICROGram(s) IV Push at bedtime  multivitamin 1 Tablet(s) Oral daily  pantoprazole   Suspension 40 milliGRAM(s) Oral daily  polyethylene glycol 3350 17 Gram(s) Oral daily  povidone iodine 10% Solution 1 Application(s) Topical daily  senna 2 Tablet(s) Oral at bedtime  sertraline 25 milliGRAM(s) Oral daily  sodium chloride 0.9%. 500 milliLiter(s) (30 mL/Hr) IV Continuous <Continuous>  thiamine 100 milliGRAM(s) Oral daily    MEDICATIONS  (PRN):      Objective:    Vitals: Vital Signs Last 24 Hrs  T(C): 36.4 (01-12-24 @ 05:11), Max: 36.6 (01-11-24 @ 12:13)  T(F): 97.6 (01-12-24 @ 05:11), Max: 97.9 (01-11-24 @ 12:13)  HR: 82 (01-12-24 @ 05:11) (75 - 85)  BP: 99/62 (01-12-24 @ 05:11) (86/54 - 122/78)  BP(mean): --  RR: 18 (01-12-24 @ 05:11) (15 - 20)  SpO2: 100% (01-12-24 @ 05:11) (94% - 100%)                I&O's Summary      PHYSICAL EXAM:  GENERAL: NAD, cachetic, chronically ill appearing   HEAD:  Atraumatic, Normocephalic  EYES: PERRL, conjunctiva and sclera clear  ENMT: dry mucous membranes   NECK: Supple  CHEST/LUNG: Clear to auscultation bilaterally; No rales, rhonchi, wheezing, or rubs but difficult to assess due to contracted habitus  HEART: Regular rate and rhythm; No murmurs, rubs, or gallops  ABDOMEN: Soft, Nontender, Nondistended; Bowel sounds present  EXTREMITIES:  no clubbing, cyanosis, or edema   SKIN: No rashes or lesions  NERVOUS SYSTEM:  Alert, opens eyes to voice, contracted extremities, squeezes fingers on command  PSYCH: Unable to assess    LABS:                        9.8    9.36  )-----------( 322      ( 12 Jan 2024 07:20 )             31.4                         10.1   9.94  )-----------( 329      ( 11 Jan 2024 06:56 )             32.2     01-12    138  |  103  |  13  ----------------------------<  87  4.3   |  24  |  0.37<L>  01-11    137  |  101  |  10  ----------------------------<  101<H>  4.3   |  26  |  0.30<L>    Ca    8.6      12 Jan 2024 07:16  Ca    8.2<L>      11 Jan 2024 06:54  Phos  3.1     01-12  Mg     2.3     01-12    TPro  6.1  /  Alb  2.9<L>  /  TBili  0.7  /  DBili  x   /  AST  9<L>  /  ALT  8<L>  /  AlkPhos  60  01-12  TPro  6.3  /  Alb  2.8<L>  /  TBili  0.5  /  DBili  x   /  AST  11  /  ALT  7<L>  /  AlkPhos  63  01-11    CAPILLARY BLOOD GLUCOSE      POCT Blood Glucose.: 96 mg/dL (12 Jan 2024 05:32)  POCT Blood Glucose.: 98 mg/dL (12 Jan 2024 00:40)  POCT Blood Glucose.: 115 mg/dL (11 Jan 2024 18:45)    PT/INR - ( 11 Jan 2024 06:55 )   PT: 12.3 sec;   INR: 1.18 ratio         PTT - ( 11 Jan 2024 06:55 )  PTT:29.2 sec    Urinalysis Basic - ( 12 Jan 2024 07:16 )    Color: x / Appearance: x / SG: x / pH: x  Gluc: 87 mg/dL / Ketone: x  / Bili: x / Urobili: x   Blood: x / Protein: x / Nitrite: x   Leuk Esterase: x / RBC: x / WBC x   Sq Epi: x / Non Sq Epi: x / Bacteria: x          RADIOLOGY & ADDITIONAL TESTS:    Imaging Personally Reviewed:  [x ] YES  [ ] NO    Consultants involved in case:   Consultant(s) Notes Reviewed:  [ x] YES  [ ] NO:   Care Discussed with Consultants/Other Providers [x ] YES  [ ] NO

## 2024-01-12 NOTE — PROGRESS NOTE ADULT - PROBLEM SELECTOR PLAN 6
P/w progressively worsening lethargy/encephalopathy i/s/o no PO intake for 2-3 days; at baseline, answers simple questions (Y/N, name) per son. Most likely ddx include metabolic (hypernatremia) vs infection (c/f UTI) vs severe malnutrition vs mixed.  S/p brain MRI 1/4/2024: No recent ischemia, intracranial hemorrhages or enhancing mass lesions.  Currently AOx0, non-verbal, opens eyes to voice, occasionally can follow commands.  - Continue tube feeds  - Continue sertraline Hx of Grave's disease s/p radioactive iodine  - resumed home levothyroxine in IV formulation

## 2024-01-12 NOTE — CHART NOTE - NSCHARTNOTEFT_GEN_A_CORE
Nutrition Follow Up Note  Patient seen for: nutrition follow-up, nutrition consult for tube feeding     Chart reviewed, events noted. This is a "68 year old F with PMH significant for breast cancer s/p L mastectomy, TBI (s/p a fall ~2 years ago) c/b aphasia, meningioma, hypothyroidism (hx Grave's dz s/p radioactive iodine), CVA, and dysphagia who presents to [facility] ED on 12/20/2023 with no PO intake and increased lethargy in the last 2-3 days, admitted for hypernatremia 166 and UTI. Hospital course complicated by AHRF likely 2/2 to aspiration pneumonitis" , now s/p PEG placement     Source: [] Patient       [x] EMR        [] RN        [] Family at bedside       [] Other:    -If unable to interview patient: [] Trach/Vent/BiPAP  [x] Disoriented/confused/inappropriate to interview    Diet Order:   Diet, NPO (01-12-24)  Diet, NPO after Midnight:      NPO Start Date: 10-Angel-2024,   NPO Start Time: 23:59 (01-10-24)    - Is current order appropriate/adequate? [] Yes  [x]  No:    - Nutrition-related concerns:  -pt with NGT feeds since 12/22, pt recommended for puree diet with moderately thick liquids on 12/26, 1/7 by Speech Language Pathologist. Pt on both PO diet and NGT feeds due to pt's inability to meet caloric needs orally due to lethargy.   -pt s/p FEES per family request. Per Speech Language Pathologist "given high risk of aspiration, primary recommendation is for NPO w/ alternative means of nutrition/hydration/medication. If pleasure feeds are within GOC, consider conservative diet of puree/moderately thick liquids via 1/2 tsp amounts; FEED ONLY WHEN AWAKE/ALERT"  -Per GOC, family wishing to pursue PEG. Pt now s/p PEG on 1/11.          GI:  Last bowel movement ___.   Bowel Regimen? [] Yes   [] No      Weights:   40.8Kg (1/11), bed weight obtained by 42.4Kg (1/12)  IBW: 52.1Kg     Nutritionally Pertinent MEDICATIONS  (STANDING):  ascorbic acid  folic acid  lactated ringers.  levothyroxine Injectable  multivitamin  pantoprazole   Suspension  polyethylene glycol 3350  senna  sodium chloride 0.9%.  thiamine    Pertinent Labs: 01-12 @ 07:16: Na 138, BUN 13, Cr 0.37<L>, BG 87, K+ 4.3, Phos 3.1, Mg 2.3, Alk Phos 60, ALT/SGPT 8<L>, AST/SGOT 9<L>, HbA1c --    A1C with Estimated Average Glucose Result: 5.7 % (09-19-23 @ 10:33)    Finger Sticks:  POCT Blood Glucose.: 96 mg/dL (01-12 @ 05:32)  POCT Blood Glucose.: 98 mg/dL (01-12 @ 00:40)  POCT Blood Glucose.: 115 mg/dL (01-11 @ 18:45)      Skin per nursing documentation:   per wound care 1/11  "Sacral/ Buttocks /rt  hip stage 4 pressure injury  left hip/spine  unstageable pressure injury  Lt Shoulder DTI resolving  Incontinence Associated Dermatitis  Lt foot DTI (referred to podiatry)"    Edema: none     Estimated Needs:   [] no change since previous assessment  [x] recalculated:   Estimated Needs: based on dosing weight 40.8kg  Energy: 6358-3547 kcal/day (35-40kcal/kg)  Protein: 65-73g Pro/day (1.6-1.8 g Pro/kg)  Fluid: defer to MD team    Previous Nutrition Diagnosis:   1) Underweight; Malnourished   2) Increased protein-energy needs   Nutrition Diagnosis is: ongoing -- to be addressed with PEG feeds    New Nutrition Diagnosis: [x] Not applicable    Nutrition Care Plan:  [x] In Progress  [] Achieved  [] Not applicable    Nutrition Interventions:     Education Provided:       [] Yes:  [x] No:        Recommendations:           [x] As medically feasible recommend Glucerna 1.2 @ starting at 15ml/hr and advance by [id number] or as tolerated to goal rate of 55 mL/hr x 24hrs. Provides 1584 kcal, 79.2 g Pro, 1062.6 mL water in 1320 mL total volume. Monitor tolerance to regimen, adequacy of PO intake and need for tube feed regimen adjustment.     If plan to transition to bolus feeds once tolerating feeds at goal rate: Consider bolus feeds of Glucerna 1.2 @ 237ml 5x/daily. Regimen provides 1185ml, 1442Kcal, 71gm      [x] Continue Carlos BID for wound healing (provides 90Kcal and 14gm amino acids per packet)     [x] Continue current micronutrient supplementation: multivitamin, ascorbic acid.      [x] Monitor GOC.     Monitoring and Evaluation:   Continue to monitor nutritional intake, tolerance to diet prescription, weights, labs, skin integrity      RD remains available upon request and will follow up per protocol  Madelaine Terry RD, CDN, Available on Teams Nutrition Follow Up Note  Patient seen for: nutrition follow-up, nutrition consult for tube feeding     Chart reviewed, events noted. This is a "68 year old F with PMH significant for breast cancer s/p L mastectomy, TBI (s/p a fall ~2 years ago) c/b aphasia, meningioma, hypothyroidism (hx Grave's dz s/p radioactive iodine), CVA, and dysphagia who presents to [facility] ED on 12/20/2023 with no PO intake and increased lethargy in the last 2-3 days, admitted for hypernatremia 166 and UTI. Hospital course complicated by AHRF likely 2/2 to aspiration pneumonitis" , now s/p PEG placement     Source: [] Patient       [x] EMR        [] RN        [] Family at bedside       [] Other:    -If unable to interview patient: [] Trach/Vent/BiPAP  [x] Disoriented/confused/inappropriate to interview    Diet Order:   Diet, NPO (01-12-24)  Diet, NPO after Midnight:      NPO Start Date: 10-Angel-2024,   NPO Start Time: 23:59 (01-10-24)    - Is current order appropriate/adequate? [] Yes  [x]  No:    - Nutrition-related concerns:  -pt with NGT feeds since 12/22, pt recommended for puree diet with moderately thick liquids on 12/26, 1/7 by Speech Language Pathologist. Pt on both PO diet and NGT feeds due to pt's inability to meet caloric needs orally due to lethargy.   -pt s/p FEES per family request. Per Speech Language Pathologist "given high risk of aspiration, primary recommendation is for NPO w/ alternative means of nutrition/hydration/medication. If pleasure feeds are within GOC, consider conservative diet of puree/moderately thick liquids via 1/2 tsp amounts; FEED ONLY WHEN AWAKE/ALERT"  -Per GOC, family wishing to pursue PEG. Pt now s/p PEG on 1/11.          GI:  Last bowel movement ___.   Bowel Regimen? [] Yes   [] No      Weights:   40.8Kg (1/11), bed weight obtained by 42.4Kg (1/12)  IBW: 52.1Kg     Nutritionally Pertinent MEDICATIONS  (STANDING):  ascorbic acid  folic acid  lactated ringers.  levothyroxine Injectable  multivitamin  pantoprazole   Suspension  polyethylene glycol 3350  senna  sodium chloride 0.9%.  thiamine    Pertinent Labs: 01-12 @ 07:16: Na 138, BUN 13, Cr 0.37<L>, BG 87, K+ 4.3, Phos 3.1, Mg 2.3, Alk Phos 60, ALT/SGPT 8<L>, AST/SGOT 9<L>, HbA1c --    A1C with Estimated Average Glucose Result: 5.7 % (09-19-23 @ 10:33)    Finger Sticks:  POCT Blood Glucose.: 96 mg/dL (01-12 @ 05:32)  POCT Blood Glucose.: 98 mg/dL (01-12 @ 00:40)  POCT Blood Glucose.: 115 mg/dL (01-11 @ 18:45)      Skin per nursing documentation:   per wound care 1/11  "Sacral/ Buttocks /rt  hip stage 4 pressure injury  left hip/spine  unstageable pressure injury  Lt Shoulder DTI resolving  Incontinence Associated Dermatitis  Lt foot DTI (referred to podiatry)"    Edema: none     Estimated Needs:   [] no change since previous assessment  [x] recalculated:   Estimated Needs: based on dosing weight 40.8kg  Energy: 0183-1842 kcal/day (35-40kcal/kg)  Protein: 65-73g Pro/day (1.6-1.8 g Pro/kg)  Fluid: defer to MD team    Previous Nutrition Diagnosis:   1) Underweight; Malnourished   2) Increased protein-energy needs   Nutrition Diagnosis is: ongoing -- to be addressed with PEG feeds    New Nutrition Diagnosis: [x] Not applicable    Nutrition Care Plan:  [x] In Progress  [] Achieved  [] Not applicable    Nutrition Interventions:     Education Provided:       [] Yes:  [x] No:        Recommendations:           [x] As medically feasible recommend Glucerna 1.2 @ starting at 15ml/hr and advance by [id number] or as tolerated to goal rate of 55 mL/hr x 24hrs. Provides 1584 kcal, 79.2 g Pro, 1062.6 mL water in 1320 mL total volume. Monitor tolerance to regimen, adequacy of PO intake and need for tube feed regimen adjustment.     If plan to transition to bolus feeds once tolerating feeds at goal rate: Consider bolus feeds of Glucerna 1.2 @ 237ml 5x/daily. Regimen provides 1185ml, 1442Kcal, 71gm      [x] Continue Carlos BID for wound healing (provides 90Kcal and 14gm amino acids per packet)     [x] Continue current micronutrient supplementation: multivitamin, ascorbic acid.      [x] Monitor GOC.     Monitoring and Evaluation:   Continue to monitor nutritional intake, tolerance to diet prescription, weights, labs, skin integrity      RD remains available upon request and will follow up per protocol  Madelaine Terry RD, CDN, Available on Teams Nutrition Follow Up Note  Patient seen for: nutrition follow-up, nutrition consult for tube feeding     Chart reviewed, events noted. This is a "68 year old F with PMH significant for breast cancer s/p L mastectomy, TBI (s/p a fall ~2 years ago) c/b aphasia, meningioma, hypothyroidism (hx Grave's dz s/p radioactive iodine), CVA, and dysphagia who presents to [facility] ED on 12/20/2023 with no PO intake and increased lethargy in the last 2-3 days, admitted for hypernatremia 166 and UTI. Hospital course complicated by AHRF likely 2/2 to aspiration pneumonitis" , now s/p PEG placement     Source: [] Patient       [x] EMR        [] RN        [] Family at bedside       [] Other:    -If unable to interview patient: [] Trach/Vent/BiPAP  [x] Disoriented/confused/inappropriate to interview    Diet Order:   Diet, NPO (01-12-24)  Diet, NPO after Midnight:      NPO Start Date: 10-Angel-2024,   NPO Start Time: 23:59 (01-10-24)    - Is current order appropriate/adequate? [] Yes  [x]  No:    - Nutrition-related concerns:  -pt with NGT feeds since 12/22, pt recommended for puree diet with moderately thick liquids on 12/26, 1/7 by Speech Language Pathologist. Pt on both PO diet and NGT feeds due to pt's inability to meet caloric needs orally due to lethargy.   -pt s/p FEES per family request. Per Speech Language Pathologist "given high risk of aspiration, primary recommendation is for NPO w/ alternative means of nutrition/hydration/medication. If pleasure feeds are within GOC, consider conservative diet of puree/moderately thick liquids via 1/2 tsp amounts; FEED ONLY WHEN AWAKE/ALERT"  -Per GOC, family wishing to pursue PEG. Pt now s/p PEG on 1/11.        GI:  Last bowel movement __1/6_.   Bowel Regimen? [x] Yes   [] No      Weights:   40.8Kg (1/11), bed weight obtained by 42.4Kg (1/12)  IBW: 52.1Kg     Nutritionally Pertinent MEDICATIONS  (STANDING):  ascorbic acid  folic acid  lactated ringers.  levothyroxine Injectable  multivitamin  pantoprazole   Suspension  polyethylene glycol 3350  senna  sodium chloride 0.9%.  thiamine    Pertinent Labs: 01-12 @ 07:16: Na 138, BUN 13, Cr 0.37<L>, BG 87, K+ 4.3, Phos 3.1, Mg 2.3, Alk Phos 60, ALT/SGPT 8<L>, AST/SGOT 9<L>, HbA1c --    A1C with Estimated Average Glucose Result: 5.7 % (09-19-23 @ 10:33)    Finger Sticks:  POCT Blood Glucose.: 96 mg/dL (01-12 @ 05:32)  POCT Blood Glucose.: 98 mg/dL (01-12 @ 00:40)  POCT Blood Glucose.: 115 mg/dL (01-11 @ 18:45)      Skin per nursing documentation:   per wound care 1/11  "Sacral/ Buttocks /rt  hip stage 4 pressure injury  left hip/spine  unstageable pressure injury  Lt Shoulder DTI resolving  Incontinence Associated Dermatitis  Lt foot DTI (referred to podiatry)"    Edema: none     Estimated Needs:   [] no change since previous assessment  [x] recalculated:   Estimated Needs: based on dosing weight 40.8kg  Energy: 1890-8411 kcal/day (35-40kcal/kg)  Protein: 65-73g Pro/day (1.6-1.8 g Pro/kg)  Fluid: defer to MD team    Previous Nutrition Diagnosis:   1) Underweight; Malnourished   2) Increased protein-energy needs   Nutrition Diagnosis is: ongoing -- to be addressed with PEG feeds    New Nutrition Diagnosis: [x] Not applicable    Nutrition Care Plan:  [x] In Progress  [] Achieved  [] Not applicable    Nutrition Interventions:     Education Provided:       [] Yes:  [x] No:        Recommendations:           [x] As medically feasible recommend Glucerna 1.2 @ starting at 15ml/hr and advance by [id number] or as tolerated to goal rate of 55 mL/hr x 24hrs. Provides 1584 kcal, 79.2 g Pro, 1062.6 mL water in 1320 mL total volume. Meets 38.8Kcal/kg, 1.9gm protein based on dosing weight of 40.8Kg. Monitor tolerance to regimen, adequacy of PO intake and need for tube feed regimen adjustment.     If plan to transition to bolus feeds once tolerating continuous feeds at goal rate, consider bolus feeds of Glucerna 1.2 @ 237ml 5x/daily. Regimen provides 1185ml, 1442Kcal, 71gm and 953ml free-water. Meets      [x] Continue Carlos BID for wound healing (provides 90Kcal and 14gm amino acids per packet)     [x] Continue current micronutrient supplementation: multivitamin, ascorbic acid.      [x] Monitor GOC.     Monitoring and Evaluation:   Continue to monitor nutritional intake, tolerance to diet prescription, weights, labs, skin integrity      RD remains available upon request and will follow up per protocol  Madelaine Terry RD, CDN, Available on Teams Nutrition Follow Up Note  Patient seen for: nutrition follow-up, nutrition consult for tube feeding     Chart reviewed, events noted. This is a "68 year old F with PMH significant for breast cancer s/p L mastectomy, TBI (s/p a fall ~2 years ago) c/b aphasia, meningioma, hypothyroidism (hx Grave's dz s/p radioactive iodine), CVA, and dysphagia who presents to [facility] ED on 12/20/2023 with no PO intake and increased lethargy in the last 2-3 days, admitted for hypernatremia 166 and UTI. Hospital course complicated by AHRF likely 2/2 to aspiration pneumonitis" , now s/p PEG placement     Source: [] Patient       [x] EMR        [] RN        [] Family at bedside       [] Other:    -If unable to interview patient: [] Trach/Vent/BiPAP  [x] Disoriented/confused/inappropriate to interview    Diet Order:   Diet, NPO (01-12-24)  Diet, NPO after Midnight:      NPO Start Date: 10-Angel-2024,   NPO Start Time: 23:59 (01-10-24)    - Is current order appropriate/adequate? [] Yes  [x]  No:    - Nutrition-related concerns:  -pt with NGT feeds since 12/22, pt recommended for puree diet with moderately thick liquids on 12/26, 1/7 by Speech Language Pathologist. Pt on both PO diet and NGT feeds due to pt's inability to meet caloric needs orally due to lethargy.   -pt s/p FEES per family request. Per Speech Language Pathologist "given high risk of aspiration, primary recommendation is for NPO w/ alternative means of nutrition/hydration/medication. If pleasure feeds are within GOC, consider conservative diet of puree/moderately thick liquids via 1/2 tsp amounts; FEED ONLY WHEN AWAKE/ALERT"  -Per GOC, family wishing to pursue PEG. Pt now s/p PEG on 1/11.        GI:  Last bowel movement __1/6_.   Bowel Regimen? [x] Yes   [] No      Weights:   40.8Kg (1/11), bed weight obtained by 42.4Kg (1/12)  IBW: 52.1Kg     Nutritionally Pertinent MEDICATIONS  (STANDING):  ascorbic acid  folic acid  lactated ringers.  levothyroxine Injectable  multivitamin  pantoprazole   Suspension  polyethylene glycol 3350  senna  sodium chloride 0.9%.  thiamine    Pertinent Labs: 01-12 @ 07:16: Na 138, BUN 13, Cr 0.37<L>, BG 87, K+ 4.3, Phos 3.1, Mg 2.3, Alk Phos 60, ALT/SGPT 8<L>, AST/SGOT 9<L>, HbA1c --    A1C with Estimated Average Glucose Result: 5.7 % (09-19-23 @ 10:33)    Finger Sticks:  POCT Blood Glucose.: 96 mg/dL (01-12 @ 05:32)  POCT Blood Glucose.: 98 mg/dL (01-12 @ 00:40)  POCT Blood Glucose.: 115 mg/dL (01-11 @ 18:45)      Skin per nursing documentation:   per wound care 1/11  "Sacral/ Buttocks /rt  hip stage 4 pressure injury  left hip/spine  unstageable pressure injury  Lt Shoulder DTI resolving  Incontinence Associated Dermatitis  Lt foot DTI (referred to podiatry)"    Edema: none     Estimated Needs:   [] no change since previous assessment  [x] recalculated:   Estimated Needs: based on dosing weight 40.8kg  Energy: 0382-7799 kcal/day (35-40kcal/kg)  Protein: 65-73g Pro/day (1.6-1.8 g Pro/kg)  Fluid: defer to MD team    Previous Nutrition Diagnosis:   1) Underweight; Malnourished   2) Increased protein-energy needs   Nutrition Diagnosis is: ongoing -- to be addressed with PEG feeds    New Nutrition Diagnosis: [x] Not applicable    Nutrition Care Plan:  [x] In Progress  [] Achieved  [] Not applicable    Nutrition Interventions:     Education Provided:       [] Yes:  [x] No:        Recommendations:           [x] As medically feasible recommend Glucerna 1.2 @ starting at 15ml/hr and advance by [id number] or as tolerated to goal rate of 55 mL/hr x 24hrs. Provides 1584 kcal, 79.2 g Pro, 1062.6 mL water in 1320 mL total volume. Meets 38.8Kcal/kg, 1.9gm protein based on dosing weight of 40.8Kg. Monitor tolerance to regimen, adequacy of PO intake and need for tube feed regimen adjustment.     If plan to transition to bolus feeds once tolerating continuous feeds at goal rate, consider bolus feeds of Glucerna 1.2 @ 237ml 5x/daily. Regimen provides 1185ml, 1442Kcal, 71gm and 953ml free-water. Meets      [x] Continue Carlos BID for wound healing (provides 90Kcal and 14gm amino acids per packet)     [x] Continue current micronutrient supplementation: multivitamin, ascorbic acid.      [x] Monitor GOC.     Monitoring and Evaluation:   Continue to monitor nutritional intake, tolerance to diet prescription, weights, labs, skin integrity      RD remains available upon request and will follow up per protocol  Madelaine Terry RD, CDN, Available on Teams Nutrition Follow Up Note  Patient seen for: nutrition follow-up, nutrition consult for tube feeding     Chart reviewed, events noted. This is a "68 year old F with PMH significant for breast cancer s/p L mastectomy, TBI (s/p a fall ~2 years ago) c/b aphasia, meningioma, hypothyroidism (hx Grave's dz s/p radioactive iodine), CVA, and dysphagia who presents to [facility] ED on 12/20/2023 with no PO intake and increased lethargy in the last 2-3 days, admitted for hypernatremia 166 and UTI. Hospital course complicated by AHRF likely 2/2 to aspiration pneumonitis" , now s/p PEG placement     Source: [] Patient       [x] EMR        [] RN        [] Family at bedside       [] Other:    -If unable to interview patient: [] Trach/Vent/BiPAP  [x] Disoriented/confused/inappropriate to interview    Diet Order:   Diet, NPO (01-12-24)  Diet, NPO after Midnight:      NPO Start Date: 10-Angel-2024,   NPO Start Time: 23:59 (01-10-24)    - Is current order appropriate/adequate? [] Yes  [x]  No:    - Nutrition-related concerns:  -pt with NGT feeds since 12/22, pt recommended for puree diet with moderately thick liquids on 12/26, 1/7 by Speech Language Pathologist. Pt on both PO diet and NGT feeds due to pt's inability to meet caloric needs orally due to lethargy.   -pt s/p FEES per family request. Per Speech Language Pathologist "given high risk of aspiration, primary recommendation is for NPO w/ alternative means of nutrition/hydration/medication. If pleasure feeds are within GOC, consider conservative diet of puree/moderately thick liquids via 1/2 tsp amounts; FEED ONLY WHEN AWAKE/ALERT"  -Per GOC, family wishing to pursue PEG. Pt now s/p PEG on 1/11.        GI:  Last bowel movement __1/6_.   Bowel Regimen? [x] Yes   [] No      Weights:   40.8Kg (1/11), bed weight obtained by 42.4Kg (1/12)  IBW: 52.1Kg     Nutritionally Pertinent MEDICATIONS  (STANDING):  ascorbic acid  folic acid  lactated ringers.  levothyroxine Injectable  multivitamin  pantoprazole   Suspension  polyethylene glycol 3350  senna  sodium chloride 0.9%.  thiamine    Pertinent Labs: 01-12 @ 07:16: Na 138, BUN 13, Cr 0.37<L>, BG 87, K+ 4.3, Phos 3.1, Mg 2.3, Alk Phos 60, ALT/SGPT 8<L>, AST/SGOT 9<L>, HbA1c --    A1C with Estimated Average Glucose Result: 5.7 % (09-19-23 @ 10:33)    Finger Sticks:  POCT Blood Glucose.: 96 mg/dL (01-12 @ 05:32)  POCT Blood Glucose.: 98 mg/dL (01-12 @ 00:40)  POCT Blood Glucose.: 115 mg/dL (01-11 @ 18:45)      Skin per nursing documentation:   per wound care 1/11  "Sacral/ Buttocks /rt  hip stage 4 pressure injury  left hip/spine  unstageable pressure injury  Lt Shoulder DTI resolving  Incontinence Associated Dermatitis  Lt foot DTI (referred to podiatry)"    Edema: none     Estimated Needs:   [] no change since previous assessment  [x] recalculated:   Estimated Needs: based on dosing weight 40.8kg  Energy: 3989-8848 kcal/day (35-40kcal/kg)  Protein: 65-73g Pro/day (1.6-1.8 g Pro/kg)  Fluid: defer to MD team    Previous Nutrition Diagnosis:   1) Underweight; Malnourished   2) Increased protein-energy needs   Nutrition Diagnosis is: ongoing -- to be addressed with PEG feeds    New Nutrition Diagnosis: [x] Not applicable    Nutrition Care Plan:  [x] In Progress  [] Achieved  [] Not applicable    Nutrition Interventions:     Education Provided:       [] Yes:  [x] No:        Recommendations:           [x] As medically feasible recommend Glucerna 1.2 @ starting at 10ml/hr and advance by [id number] or as tolerated to goal rate of 50 mL/hr x 24hrs. Provides 1440 kcal, 72 g Pro, 966mL water in 1200 mL total volume. Meets 35Kcal/kg, 1.78gm protein based on dosing weight of 40.8Kg. Monitor tolerance to regimen, adequacy of PO intake and need for tube feed regimen adjustment.     If plan to transition to bolus feeds once tolerating continuous feeds at goal rate, consider bolus feeds of Glucerna 1.2 @ 237ml 5x/daily. Regimen provides 1185ml, 1442Kcal, 71gm and 953ml free-water. Meets      [x] Continue Carlos BID for wound healing (provides 90Kcal and 14gm amino acids per packet)     [x] Continue current micronutrient supplementation: multivitamin, ascorbic acid.      [x] Monitor GOC.     Monitoring and Evaluation:   Continue to monitor nutritional intake, tolerance to diet prescription, weights, labs, skin integrity      RD remains available upon request and will follow up per protocol  Madelaine Terry RD, CDN, Available on Teams Nutrition Follow Up Note  Patient seen for: nutrition follow-up, nutrition consult for tube feeding     Chart reviewed, events noted. This is a "68 year old F with PMH significant for breast cancer s/p L mastectomy, TBI (s/p a fall ~2 years ago) c/b aphasia, meningioma, hypothyroidism (hx Grave's dz s/p radioactive iodine), CVA, and dysphagia who presents to [facility] ED on 12/20/2023 with no PO intake and increased lethargy in the last 2-3 days, admitted for hypernatremia 166 and UTI. Hospital course complicated by AHRF likely 2/2 to aspiration pneumonitis" , now s/p PEG placement     Source: [] Patient       [x] EMR        [] RN        [] Family at bedside       [] Other:    -If unable to interview patient: [] Trach/Vent/BiPAP  [x] Disoriented/confused/inappropriate to interview    Diet Order:   Diet, NPO (01-12-24)  Diet, NPO after Midnight:      NPO Start Date: 10-Angel-2024,   NPO Start Time: 23:59 (01-10-24)    - Is current order appropriate/adequate? [] Yes  [x]  No:    - Nutrition-related concerns:  -pt with NGT feeds since 12/22, pt recommended for puree diet with moderately thick liquids on 12/26, 1/7 by Speech Language Pathologist. Pt on both PO diet and NGT feeds due to pt's inability to meet caloric needs orally due to lethargy.   -pt s/p FEES per family request. Per Speech Language Pathologist "given high risk of aspiration, primary recommendation is for NPO w/ alternative means of nutrition/hydration/medication. If pleasure feeds are within GOC, consider conservative diet of puree/moderately thick liquids via 1/2 tsp amounts; FEED ONLY WHEN AWAKE/ALERT"  -Per GOC, family wishing to pursue PEG. Pt now s/p PEG on 1/11.        GI:  Last bowel movement __1/6_.   Bowel Regimen? [x] Yes   [] No      Weights:   40.8Kg (1/11), bed weight obtained by 42.4Kg (1/12)  IBW: 52.1Kg     Nutritionally Pertinent MEDICATIONS  (STANDING):  ascorbic acid  folic acid  lactated ringers.  levothyroxine Injectable  multivitamin  pantoprazole   Suspension  polyethylene glycol 3350  senna  sodium chloride 0.9%.  thiamine    Pertinent Labs: 01-12 @ 07:16: Na 138, BUN 13, Cr 0.37<L>, BG 87, K+ 4.3, Phos 3.1, Mg 2.3, Alk Phos 60, ALT/SGPT 8<L>, AST/SGOT 9<L>, HbA1c --    A1C with Estimated Average Glucose Result: 5.7 % (09-19-23 @ 10:33)    Finger Sticks:  POCT Blood Glucose.: 96 mg/dL (01-12 @ 05:32)  POCT Blood Glucose.: 98 mg/dL (01-12 @ 00:40)  POCT Blood Glucose.: 115 mg/dL (01-11 @ 18:45)      Skin per nursing documentation:   per wound care 1/11  "Sacral/ Buttocks /rt  hip stage 4 pressure injury  left hip/spine  unstageable pressure injury  Lt Shoulder DTI resolving  Incontinence Associated Dermatitis  Lt foot DTI (referred to podiatry)"    Edema: none     Estimated Needs:   [] no change since previous assessment  [x] recalculated:   Estimated Needs: based on dosing weight 40.8kg  Energy: 8358-6823 kcal/day (35-40kcal/kg)  Protein: 65-73g Pro/day (1.6-1.8 g Pro/kg)  Fluid: defer to MD team    Previous Nutrition Diagnosis:   1) Underweight; Malnourished   2) Increased protein-energy needs   Nutrition Diagnosis is: ongoing -- to be addressed with PEG feeds    New Nutrition Diagnosis: [x] Not applicable    Nutrition Care Plan:  [x] In Progress  [] Achieved  [] Not applicable    Nutrition Interventions:     Education Provided:       [] Yes:  [x] No:        Recommendations:           [x] As medically feasible recommend Glucerna 1.2 @ starting at 10ml/hr and advance by [id number] or as tolerated to goal rate of 50 mL/hr x 24hrs. Provides 1440 kcal, 72 g Pro, 966mL water in 1200 mL total volume. Meets 35Kcal/kg, 1.78gm protein based on dosing weight of 40.8Kg. Monitor tolerance to regimen, adequacy of PO intake and need for tube feed regimen adjustment.     If plan to transition to bolus feeds once tolerating continuous feeds at goal rate, consider bolus feeds of Glucerna 1.2 @ 237ml 5x/daily. Regimen provides 1185ml, 1442Kcal, 71gm and 953ml free-water. Meets      [x] Continue Carlos BID for wound healing (provides 90Kcal and 14gm amino acids per packet)     [x] Continue current micronutrient supplementation: multivitamin, ascorbic acid.      [x] Monitor GOC.     Monitoring and Evaluation:   Continue to monitor nutritional intake, tolerance to diet prescription, weights, labs, skin integrity      RD remains available upon request and will follow up per protocol  Madelaine Terry RD, CDN, Available on Teams Nutrition Follow Up Note  Patient seen for: nutrition follow-up, nutrition consult for tube feeding     Chart reviewed, events noted. This is a "68 year old F with PMH significant for breast cancer s/p L mastectomy, TBI (s/p a fall ~2 years ago) c/b aphasia, meningioma, hypothyroidism (hx Grave's dz s/p radioactive iodine), CVA, and dysphagia who presents to [facility] ED on 12/20/2023 with no PO intake and increased lethargy in the last 2-3 days, admitted for hypernatremia 166 and UTI. Hospital course complicated by AHRF likely 2/2 to aspiration pneumonitis" , now s/p PEG placement     Source: [] Patient       [x] EMR        [] RN        [] Family at bedside       [] Other:    -If unable to interview patient: [] Trach/Vent/BiPAP  [x] Disoriented/confused/inappropriate to interview    Diet Order:   Diet, NPO (01-12-24)  Diet, NPO after Midnight:      NPO Start Date: 10-Angel-2024,   NPO Start Time: 23:59 (01-10-24)    - Is current order appropriate/adequate? [] Yes  [x]  No:    - Nutrition-related concerns:  -pt with NGT feeds since 12/22, pt recommended for puree diet with moderately thick liquids on 12/26, 1/7 by Speech Language Pathologist. Pt on both PO diet and NGT feeds due to pt's inability to meet caloric needs orally due to lethargy.   -pt s/p FEES per family request. Per Speech Language Pathologist "given high risk of aspiration, primary recommendation is for NPO w/ alternative means of nutrition/hydration/medication. If pleasure feeds are within GOC, consider conservative diet of puree/moderately thick liquids via 1/2 tsp amounts; FEED ONLY WHEN AWAKE/ALERT"  -Per GOC, family wishing to pursue PEG. Pt now s/p PEG on 1/11.        GI:  Last bowel movement __1/6_.   Bowel Regimen? [x] Yes   [] No      Weights:   40.8Kg (1/11), bed weight obtained by 42.4Kg (1/12)  IBW: 52.1Kg     Nutritionally Pertinent MEDICATIONS  (STANDING):  ascorbic acid  folic acid  lactated ringers.  levothyroxine Injectable  multivitamin  pantoprazole   Suspension  polyethylene glycol 3350  senna  sodium chloride 0.9%.  thiamine    Pertinent Labs: 01-12 @ 07:16: Na 138, BUN 13, Cr 0.37<L>, BG 87, K+ 4.3, Phos 3.1, Mg 2.3, Alk Phos 60, ALT/SGPT 8<L>, AST/SGOT 9<L>, HbA1c --    A1C with Estimated Average Glucose Result: 5.7 % (09-19-23 @ 10:33)    Finger Sticks:  POCT Blood Glucose.: 96 mg/dL (01-12 @ 05:32)  POCT Blood Glucose.: 98 mg/dL (01-12 @ 00:40)  POCT Blood Glucose.: 115 mg/dL (01-11 @ 18:45)      Skin per nursing documentation:   per wound care 1/11  "Sacral/ Buttocks /rt  hip stage 4 pressure injury  left hip/spine  unstageable pressure injury  Lt Shoulder DTI resolving  Incontinence Associated Dermatitis  Lt foot DTI (referred to podiatry)"    Edema: none     Estimated Needs:   [] no change since previous assessment  [x] recalculated:   Estimated Needs: based on dosing weight 40.8kg  Energy: 5564-1622 kcal/day (35-40kcal/kg)  Protein: 65-73g Pro/day (1.6-1.8 g Pro/kg)  Fluid: defer to MD team    Previous Nutrition Diagnosis:   1) Underweight; Malnourished   2) Increased protein-energy needs   Nutrition Diagnosis is: ongoing -- to be addressed with PEG feeds    New Nutrition Diagnosis: [x] Not applicable    Nutrition Care Plan:  [x] In Progress  [] Achieved  [] Not applicable    Nutrition Interventions:     Education Provided:       [] Yes:  [x] No:        Recommendations:           [x] As medically feasible recommend Glucerna 1.2 @ starting at 10ml/hr and advance by [id number] or as tolerated to goal rate of 50 mL/hr x 24hrs. Provides 1440 kcal, 72 g Pro, 966mL water in 1200 mL total volume. Meets 35Kcal/kg, 1.78gm protein based on dosing weight of 40.8Kg. Monitor tolerance to regimen, adequacy of PO intake and need for tube feed regimen adjustment.     If plan to transition to bolus feeds once tolerating continuous feeds at goal rate, consider bolus feeds of Glucerna 1.2 @ 237ml (1 can) 5x/daily. Regimen provides 1185ml, 1442Kcal, 71gm and 953ml free-water. Meets      [x] Continue Carlos BID for wound healing (provides 90Kcal and 14gm amino acids per packet)     [x] Continue current micronutrient supplementation: multivitamin, ascorbic acid.      [x] Monitor GOC.     Monitoring and Evaluation:   Continue to monitor nutritional intake, tolerance to diet prescription, weights, labs, skin integrity      RD remains available upon request and will follow up per protocol  Madelaine Terry RD, CDN, Available on Teams Nutrition Follow Up Note  Patient seen for: nutrition follow-up, nutrition consult for tube feeding     Chart reviewed, events noted. This is a "68 year old F with PMH significant for breast cancer s/p L mastectomy, TBI (s/p a fall ~2 years ago) c/b aphasia, meningioma, hypothyroidism (hx Grave's dz s/p radioactive iodine), CVA, and dysphagia who presents to [facility] ED on 12/20/2023 with no PO intake and increased lethargy in the last 2-3 days, admitted for hypernatremia 166 and UTI. Hospital course complicated by AHRF likely 2/2 to aspiration pneumonitis" , now s/p PEG placement     Source: [] Patient       [x] EMR        [] RN        [] Family at bedside       [] Other:    -If unable to interview patient: [] Trach/Vent/BiPAP  [x] Disoriented/confused/inappropriate to interview    Diet Order:   Diet, NPO (01-12-24)  Diet, NPO after Midnight:      NPO Start Date: 10-Angel-2024,   NPO Start Time: 23:59 (01-10-24)    - Is current order appropriate/adequate? [] Yes  [x]  No:    - Nutrition-related concerns:  -pt with NGT feeds since 12/22, pt recommended for puree diet with moderately thick liquids on 12/26, 1/7 by Speech Language Pathologist. Pt on both PO diet and NGT feeds due to pt's inability to meet caloric needs orally due to lethargy.   -pt s/p FEES per family request. Per Speech Language Pathologist "given high risk of aspiration, primary recommendation is for NPO w/ alternative means of nutrition/hydration/medication. If pleasure feeds are within GOC, consider conservative diet of puree/moderately thick liquids via 1/2 tsp amounts; FEED ONLY WHEN AWAKE/ALERT"  -Per GOC, family wishing to pursue PEG. Pt now s/p PEG on 1/11.        GI:  Last bowel movement __1/6_.   Bowel Regimen? [x] Yes   [] No      Weights:   40.8Kg (1/11), bed weight obtained by 42.4Kg (1/12)  IBW: 52.1Kg     Nutritionally Pertinent MEDICATIONS  (STANDING):  ascorbic acid  folic acid  lactated ringers.  levothyroxine Injectable  multivitamin  pantoprazole   Suspension  polyethylene glycol 3350  senna  sodium chloride 0.9%.  thiamine    Pertinent Labs: 01-12 @ 07:16: Na 138, BUN 13, Cr 0.37<L>, BG 87, K+ 4.3, Phos 3.1, Mg 2.3, Alk Phos 60, ALT/SGPT 8<L>, AST/SGOT 9<L>, HbA1c --    A1C with Estimated Average Glucose Result: 5.7 % (09-19-23 @ 10:33)    Finger Sticks:  POCT Blood Glucose.: 96 mg/dL (01-12 @ 05:32)  POCT Blood Glucose.: 98 mg/dL (01-12 @ 00:40)  POCT Blood Glucose.: 115 mg/dL (01-11 @ 18:45)      Skin per nursing documentation:   per wound care 1/11  "Sacral/ Buttocks /rt  hip stage 4 pressure injury  left hip/spine  unstageable pressure injury  Lt Shoulder DTI resolving  Incontinence Associated Dermatitis  Lt foot DTI (referred to podiatry)"    Edema: none     Estimated Needs:   [] no change since previous assessment  [x] recalculated:   Estimated Needs: based on dosing weight 40.8kg  Energy: 0377-6957 kcal/day (35-40kcal/kg)  Protein: 65-73g Pro/day (1.6-1.8 g Pro/kg)  Fluid: defer to MD team    Previous Nutrition Diagnosis:   1) Underweight; Malnourished   2) Increased protein-energy needs   Nutrition Diagnosis is: ongoing -- to be addressed with PEG feeds    New Nutrition Diagnosis: [x] Not applicable    Nutrition Care Plan:  [x] In Progress  [] Achieved  [] Not applicable    Nutrition Interventions:     Education Provided:       [] Yes:  [x] No:        Recommendations:           [x] As medically feasible recommend Glucerna 1.2 @ starting at 10ml/hr and advance by [id number] or as tolerated to goal rate of 50 mL/hr x 24hrs. Provides 1440 kcal, 72 g Pro, 966mL water in 1200 mL total volume. Meets 35Kcal/kg, 1.78gm protein based on dosing weight of 40.8Kg. Monitor tolerance to regimen, adequacy of PO intake and need for tube feed regimen adjustment.     If plan to transition to bolus feeds once tolerating continuous feeds at goal rate, consider bolus feeds of Glucerna 1.2 @ 237ml (1 can) 5x/daily. Regimen provides 1185ml, 1442Kcal, 71gm and 953ml free-water. Meets      [x] Continue Carlos BID for wound healing (provides 90Kcal and 14gm amino acids per packet)     [x] Continue current micronutrient supplementation: multivitamin, ascorbic acid.      [x] Monitor GOC.     Monitoring and Evaluation:   Continue to monitor nutritional intake, tolerance to diet prescription, weights, labs, skin integrity      RD remains available upon request and will follow up per protocol  Madelaine Terry RD, CDN, Available on Teams

## 2024-01-12 NOTE — PROGRESS NOTE ADULT - ATTENDING COMMENTS
68F with PMH significant for breast cancer s/p L mastectomy, TBI (s/p a fall ~2 years ago) c/b aphasia, meningioma, hypothyroidism (hx Grave's dz s/p radioactive iodine), CVA, and dysphagia who presents to [facility] ED on 12/20/2023 with no PO intake and increased lethargy.  Patient was admitted for hypernatremia 166 and UTI. Hospital course complicated by AHRF likely 2/2 to aspiration pneumonitis, pending further GOC with HCP (  )     Patient seen with her hands crossed ( intentional by patient ) , eyes open and following any commands , mouth open     # Acute hypoxic respiratory failure secondary to likely aspiration     S/P  5 days  of Zosyn.  SPO2 stable      cont to monitor      Mild leucocytosis with no fever --> will repeat in AM ( 1/11) , no fever , will continue to monitor   #  Dysphagia with severe malnutrition      Feeding  via NGT      Pt is not alert enough for oral feeding      Unsure if oral intake will be enough to satisfy caloric needs     GOC with family by palliative care and the medical residents and HCP opts for PEG placement-->placed on 1/11--> feeding started on 1/12   # Hypernatremia- resolved so far    CW free water boluses via NGT ( 300ml every 4 hours )   # Functional quadriplegia with multiple pressure ulcers- wound care following      cont to monitor      Dietitian is on the case   #  Hypothyroidism   Continue levothyroxine IV 56 mcg QD so far   Prognosis guarded- remains full code.     case is discussed with the resident for Team 5     Yesenia Brown   Hospitalist   available on TEAMS 68F with PMH significant for breast cancer s/p L mastectomy, TBI (s/p a fall ~2 years ago) c/b aphasia, meningioma, hypothyroidism (hx Grave's dz s/p radioactive iodine), CVA, and dysphagia who presents to [facility] ED on 12/20/2023 with no PO intake and increased lethargy.  Patient was admitted for hypernatremia 166 and UTI. Hospital course complicated by AHRF likely 2/2 to aspiration pneumonitis, pending further GOC with HCP (  )     Patient seen with her hands crossed ( intentional by patient ) , eyes open and following any commands , mouth open     # Acute hypoxic respiratory failure secondary to likely aspiration     S/P  5 days  of Zosyn.  SPO2 stable      cont to monitor      Mild leucocytosis with no fever --> will repeat in AM ( 1/11) , no fever , will continue to monitor   #  Dysphagia with severe malnutrition      Feeding  via NGT      Pt is not alert enough for oral feeding      Unsure if oral intake will be enough to satisfy caloric needs     GOC with family by palliative care and the medical residents and HCP opts for PEG placement-->placed on 1/11--> feeding started on 1/12   # Hypernatremia- resolved so far    CW free water boluses via NGT ( 300ml every 4 hours )   # Functional quadriplegia with multiple pressure ulcers- wound care following      cont to monitor      Dietitian is on the case   #  Hypothyroidism   Continue levothyroxine IV 56 mcg QD so far   Prognosis guarded- remains full code.     case is discussed with the resident for Team 5     Yesenia Brown   Hospitalist   available on TEAMS

## 2024-01-12 NOTE — PROGRESS NOTE ADULT - PROBLEM SELECTOR PLAN 2
RRT on 12/28 for hypoxia, currently on HFNC. Suspect likely aspiration pneumonitis. CXR negative for focal consolidations.  S/p Zosyn (12/29-1/2).  Resolved, on RA. Patient is anemic chronically, with recent Hgb drop.  CT C/A/P with no signs of active bleeding or hematoma.  Hgb stable

## 2024-01-12 NOTE — PROGRESS NOTE ADULT - ATTENDING COMMENTS
Agree with above. OK to use PEG today for enteral feeds. Rate/type of feeds per nutrition consult. Keep abdominal binder on patient at all times to avoid dislodgement.

## 2024-01-12 NOTE — PROGRESS NOTE ADULT - PROBLEM SELECTOR PLAN 4
Patient is anemic chronically, with recent Hgb drop.  CT C/A/P with no signs of active bleeding or hematoma.  Hgb stable Multiple pressure ulcers noted on physical exam with varying stages (I-III), some with peeling and discoloration, possible tissue loss  - Son reports pt using Santyl topical at home  - wound care consulted, appreciate recs

## 2024-01-12 NOTE — PROGRESS NOTE ADULT - NUTRITIONAL ASSESSMENT
This patient has been assessed with a concern for Malnutrition and has been determined to have a diagnosis/diagnoses of Severe protein-calorie malnutrition and Underweight (BMI < 19).    This patient is being managed with:   Diet NPO-  Entered: Jan 12 2024  6:33AM    Diet NPO after Midnight-     NPO Start Date: 10-Angel-2024   NPO Start Time: 23:59  Entered: Angel 10 2024  6:25PM

## 2024-01-12 NOTE — PROGRESS NOTE ADULT - PROBLEM SELECTOR PLAN 3
Hx of recurrent UTI (last admission 9/2023). Recent urine culture showing 100k Ecoli. S/p IV ceftriaxone (12/21-23). Febrile overnight 12/27, repeat U/A positive. Restarted on ceftriaxone (12/28-29) but escalated in setting of ongoing aspiration pneumonia.  S/p Zosyn (12/29-1/2).  Resolved P/w progressively worsening lethargy/encephalopathy i/s/o no PO intake for 2-3 days; at baseline, answers simple questions (Y/N, name) per son. Most likely ddx include metabolic (hypernatremia) vs infection (c/f UTI) vs severe malnutrition vs mixed.  S/p brain MRI 1/4/2024: No recent ischemia, intracranial hemorrhages or enhancing mass lesions.  Currently AOx0, non-verbal, opens eyes to voice, occasionally can follow commands.  - Continue tube feeds  - Continue sertraline

## 2024-01-12 NOTE — PROGRESS NOTE ADULT - ASSESSMENT
68F with PMH significant for breast cancer s/p L mastectomy, TBI (s/p a fall ~2 years ago) c/b aphasia, meningioma, hypothyroidism (hx Grave's dz s/p radioactive iodine), CVA, and dysphagia who presents to [facility] ED on 12/20/2023 with no PO intake and increased lethargy in the last 2-3 days, admitted for hypernatremia 166 and UTI. Hospital course complicated by AHRF likely 2/2 to aspiration pneumonitis, pending PEG evaluation   68F with PMH significant for breast cancer s/p L mastectomy, TBI (s/p a fall ~2 years ago) c/b aphasia, meningioma, hypothyroidism (hx Grave's dz s/p radioactive iodine), CVA, and dysphagia who presents to [facility] ED on 12/20/2023 with no PO intake and increased lethargy in the last 2-3 days, admitted for hypernatremia 166 and UTI. Hospital course complicated by AHRF likely 2/2 to aspiration pneumonitis, pending PEG evaluation

## 2024-01-12 NOTE — PROGRESS NOTE ADULT - PROBLEM SELECTOR PLAN 7
Multiple pressure ulcers noted on physical exam with varying stages (I-III), some with peeling and discoloration, possible tissue loss  - Son reports pt using Santyl topical at home  - wound care consulted, appreciate recs - DVT ppx: Lovenox  - GI ppx: Lovenox  - Diet: PEG  - Dispo: pending further GOC discussions

## 2024-01-13 LAB
GLUCOSE BLDC GLUCOMTR-MCNC: 118 MG/DL — HIGH (ref 70–99)
GLUCOSE BLDC GLUCOMTR-MCNC: 118 MG/DL — HIGH (ref 70–99)
GLUCOSE BLDC GLUCOMTR-MCNC: 131 MG/DL — HIGH (ref 70–99)
GLUCOSE BLDC GLUCOMTR-MCNC: 131 MG/DL — HIGH (ref 70–99)
GLUCOSE BLDC GLUCOMTR-MCNC: 141 MG/DL — HIGH (ref 70–99)
GLUCOSE BLDC GLUCOMTR-MCNC: 141 MG/DL — HIGH (ref 70–99)
GLUCOSE BLDC GLUCOMTR-MCNC: 85 MG/DL — SIGNIFICANT CHANGE UP (ref 70–99)
GLUCOSE BLDC GLUCOMTR-MCNC: 85 MG/DL — SIGNIFICANT CHANGE UP (ref 70–99)
GLUCOSE BLDC GLUCOMTR-MCNC: 97 MG/DL — SIGNIFICANT CHANGE UP (ref 70–99)
GLUCOSE BLDC GLUCOMTR-MCNC: 97 MG/DL — SIGNIFICANT CHANGE UP (ref 70–99)

## 2024-01-13 PROCEDURE — 99232 SBSQ HOSP IP/OBS MODERATE 35: CPT

## 2024-01-13 RX ADMIN — Medication 1 APPLICATION(S): at 22:20

## 2024-01-13 RX ADMIN — Medication 100 MILLIGRAM(S): at 10:43

## 2024-01-13 RX ADMIN — POLYETHYLENE GLYCOL 3350 17 GRAM(S): 17 POWDER, FOR SOLUTION ORAL at 10:44

## 2024-01-13 RX ADMIN — SERTRALINE 25 MILLIGRAM(S): 25 TABLET, FILM COATED ORAL at 10:43

## 2024-01-13 RX ADMIN — CHLORHEXIDINE GLUCONATE 1 APPLICATION(S): 213 SOLUTION TOPICAL at 10:51

## 2024-01-13 RX ADMIN — Medication 1 APPLICATION(S): at 14:01

## 2024-01-13 RX ADMIN — Medication 500 MILLIGRAM(S): at 10:43

## 2024-01-13 RX ADMIN — ENOXAPARIN SODIUM 40 MILLIGRAM(S): 100 INJECTION SUBCUTANEOUS at 14:01

## 2024-01-13 RX ADMIN — Medication 1 APPLICATION(S): at 05:39

## 2024-01-13 RX ADMIN — PANTOPRAZOLE SODIUM 40 MILLIGRAM(S): 20 TABLET, DELAYED RELEASE ORAL at 10:43

## 2024-01-13 RX ADMIN — Medication 1 MILLIGRAM(S): at 10:43

## 2024-01-13 RX ADMIN — Medication 1 TABLET(S): at 10:42

## 2024-01-13 RX ADMIN — SENNA PLUS 2 TABLET(S): 8.6 TABLET ORAL at 23:20

## 2024-01-13 RX ADMIN — Medication 1 APPLICATION(S): at 10:50

## 2024-01-13 NOTE — PROGRESS NOTE ADULT - ASSESSMENT
68F with PMH significant for breast cancer s/p L mastectomy, TBI (s/p a fall ~2 years ago) c/b aphasia, meningioma, hypothyroidism (hx Grave's dz s/p radioactive iodine), CVA, and dysphagia who presents to [facility] ED on 12/20/2023 with no PO intake and increased lethargy in the last 2-3 days, admitted for hypernatremia 166 and UTI. Hospital course complicated by AHRF likely 2/2 to aspiration pneumonitis, pending PEG evaluation   68F with PMH significant for breast cancer s/p L mastectomy, TBI (s/p a fall ~2 years ago) c/b aphasia, meningioma, hypothyroidism (hx Grave's dz s/p radioactive iodine), CVA, and dysphagia who presents to [facility] ED on 12/20/2023 with no PO intake and increased lethargy in the last 2-3 days, admitted for hypernatremia 166 and UTI. Hospital course complicated by AHRF likely 2/2 to aspiration pneumonitis, pending PEG evaluation

## 2024-01-13 NOTE — PROGRESS NOTE ADULT - SUBJECTIVE AND OBJECTIVE BOX
INTERVAL HPI/OVERNIGHT EVENTS:  Pt seen and examined at bedside. No acute overnight events or complaints.    VITAL SIGNS:  T(F): 97.7 (01-13-24 @ 05:00)  HR: 78 (01-13-24 @ 05:00)  BP: 110/67 (01-13-24 @ 05:00)  RR: 18 (01-13-24 @ 05:00)  SpO2: 97% (01-13-24 @ 05:00)  Wt(kg): --    PHYSICAL EXAM:    GENERAL: NAD, cachetic, chronically ill appearing   HEAD:  Atraumatic, Normocephalic  EYES: PERRL, conjunctiva and sclera clear  ENMT: dry mucous membranes   NECK: Supple  CHEST/LUNG: Clear to auscultation bilaterally; No rales, rhonchi, wheezing, or rubs but difficult to assess due to contracted habitus  HEART: Regular rate and rhythm; No murmurs, rubs, or gallops  ABDOMEN: Soft, Nontender, Nondistended; Bowel sounds present  EXTREMITIES:  no clubbing, cyanosis, or edema   SKIN: No rashes or lesions  NERVOUS SYSTEM:  Alert, opens eyes to voice, contracted extremities, squeezes fingers on command  PSYCH: Unable to assess    MEDICATIONS  (STANDING):  ascorbic acid 500 milliGRAM(s) Oral daily  chlorhexidine 2% Cloths 1 Application(s) Topical daily  Dakins Solution - 1/4 Strength 1 Application(s) Topical every 8 hours  enoxaparin Injectable 40 milliGRAM(s) SubCutaneous every 24 hours  folic acid 1 milliGRAM(s) Oral daily  lactated ringers. 500 milliLiter(s) (100 mL/Hr) IV Continuous <Continuous>  levothyroxine Injectable 56 MICROGram(s) IV Push at bedtime  multivitamin 1 Tablet(s) Oral daily  pantoprazole   Suspension 40 milliGRAM(s) Oral daily  polyethylene glycol 3350 17 Gram(s) Oral daily  povidone iodine 10% Solution 1 Application(s) Topical daily  senna 2 Tablet(s) Oral at bedtime  sertraline 25 milliGRAM(s) Oral daily  sodium chloride 0.9%. 500 milliLiter(s) (30 mL/Hr) IV Continuous <Continuous>  thiamine 100 milliGRAM(s) Oral daily    MEDICATIONS  (PRN):      Allergies    Tapazole (Hives)    Intolerances        LABS:                        9.8    9.36  )-----------( 322      ( 12 Jan 2024 07:20 )             31.4     01-12    138  |  103  |  13  ----------------------------<  87  4.3   |  24  |  0.37<L>    Ca    8.6      12 Jan 2024 07:16  Phos  3.1     01-12  Mg     2.3     01-12    TPro  6.1  /  Alb  2.9<L>  /  TBili  0.7  /  DBili  x   /  AST  9<L>  /  ALT  8<L>  /  AlkPhos  60  01-12      Urinalysis Basic - ( 12 Jan 2024 07:16 )    Color: x / Appearance: x / SG: x / pH: x  Gluc: 87 mg/dL / Ketone: x  / Bili: x / Urobili: x   Blood: x / Protein: x / Nitrite: x   Leuk Esterase: x / RBC: x / WBC x   Sq Epi: x / Non Sq Epi: x / Bacteria: x        RADIOLOGY & ADDITIONAL TESTS:  Reviewed      ******************  Authored By: Renuka Easley MD PGY2  Internal Medicine  Pager: [phone] [facility]// [id number] GAY  MS Teams Preferred  ******************   INTERVAL HPI/OVERNIGHT EVENTS:  Pt seen and examined at bedside. No acute overnight events or complaints.    VITAL SIGNS:  T(F): 97.7 (01-13-24 @ 05:00)  HR: 78 (01-13-24 @ 05:00)  BP: 110/67 (01-13-24 @ 05:00)  RR: 18 (01-13-24 @ 05:00)  SpO2: 97% (01-13-24 @ 05:00)  Wt(kg): --    PHYSICAL EXAM:    GENERAL: NAD, cachetic, chronically ill appearing   HEAD:  Atraumatic, Normocephalic  EYES: PERRL, conjunctiva and sclera clear  ENMT: dry mucous membranes   NECK: Supple  CHEST/LUNG: Clear to auscultation bilaterally; No rales, rhonchi, wheezing, or rubs but difficult to assess due to contracted habitus  HEART: Regular rate and rhythm; No murmurs, rubs, or gallops  ABDOMEN: Soft, Nontender, Nondistended; Bowel sounds present  EXTREMITIES:  no clubbing, cyanosis, or edema   SKIN: No rashes or lesions  NERVOUS SYSTEM:  Alert, opens eyes to voice, contracted extremities, squeezes fingers on command  PSYCH: Unable to assess    MEDICATIONS  (STANDING):  ascorbic acid 500 milliGRAM(s) Oral daily  chlorhexidine 2% Cloths 1 Application(s) Topical daily  Dakins Solution - 1/4 Strength 1 Application(s) Topical every 8 hours  enoxaparin Injectable 40 milliGRAM(s) SubCutaneous every 24 hours  folic acid 1 milliGRAM(s) Oral daily  lactated ringers. 500 milliLiter(s) (100 mL/Hr) IV Continuous <Continuous>  levothyroxine Injectable 56 MICROGram(s) IV Push at bedtime  multivitamin 1 Tablet(s) Oral daily  pantoprazole   Suspension 40 milliGRAM(s) Oral daily  polyethylene glycol 3350 17 Gram(s) Oral daily  povidone iodine 10% Solution 1 Application(s) Topical daily  senna 2 Tablet(s) Oral at bedtime  sertraline 25 milliGRAM(s) Oral daily  sodium chloride 0.9%. 500 milliLiter(s) (30 mL/Hr) IV Continuous <Continuous>  thiamine 100 milliGRAM(s) Oral daily    MEDICATIONS  (PRN):      Allergies    Tapazole (Hives)    Intolerances        LABS:                        9.8    9.36  )-----------( 322      ( 12 Jan 2024 07:20 )             31.4     01-12    138  |  103  |  13  ----------------------------<  87  4.3   |  24  |  0.37<L>    Ca    8.6      12 Jan 2024 07:16  Phos  3.1     01-12  Mg     2.3     01-12    TPro  6.1  /  Alb  2.9<L>  /  TBili  0.7  /  DBili  x   /  AST  9<L>  /  ALT  8<L>  /  AlkPhos  60  01-12      Urinalysis Basic - ( 12 Jan 2024 07:16 )    Color: x / Appearance: x / SG: x / pH: x  Gluc: 87 mg/dL / Ketone: x  / Bili: x / Urobili: x   Blood: x / Protein: x / Nitrite: x   Leuk Esterase: x / RBC: x / WBC x   Sq Epi: x / Non Sq Epi: x / Bacteria: x        RADIOLOGY & ADDITIONAL TESTS:  Reviewed      ******************  Authored By: Renuka Easley MD PGY2  Internal Medicine  Pager: [phone] [facility]// [id number] GAY  MS Teams Preferred  ******************

## 2024-01-13 NOTE — PROGRESS NOTE ADULT - ATTENDING COMMENTS
68F with PMH significant for breast cancer s/p L mastectomy, TBI (s/p a fall ~2 years ago) c/b aphasia, meningioma, hypothyroidism (hx Grave's dz s/p radioactive iodine), CVA, and dysphagia who presents to [facility] ED on 12/20/2023 with no PO intake and increased lethargy in the last 2-3 days, admitted for hypernatremia 166 and UTI. Hospital course complicated by AHRF likely 2/2 to aspiration pneumonitis, pending PEG evaluation.    Plan:   - C/w with PEG tube, Glucerna   - Pending DC to skilled nursing facility     Rest of management as per resident.     VTE ppx: lovenox   Diet: Glucerna, peg tube  Dispo: Skilled Nursing facility 68F with PMH significant for breast cancer s/p L mastectomy, TBI (s/p a fall ~2 years ago) c/b aphasia, meningioma, hypothyroidism (hx Grave's dz s/p radioactive iodine), CVA, and dysphagia who presents to [facility] ED on 12/20/2023 with no PO intake and increased lethargy in the last 2-3 days, admitted for hypernatremia 166 and UTI. Hospital course complicated by AHRF likely 2/2 to aspiration pneumonitis, pending PEG evaluation.    Plan:   - C/w with PEG tube, Glucerna   - Pending DC to skilled nursing facility     Rest of management as per resident.     VTE ppx: lovenox   Diet: Glucerna, peg tube  Dispo: Skilled Nursing facility

## 2024-01-13 NOTE — PROGRESS NOTE ADULT - PROBLEM SELECTOR PLAN 1
Hx of dysphagia since 2-3 months ago per son. Notable for severe b/l ext contracture and cachexia. Low PO intake at baseline, no PO intake in the last 2-3 days. NGT placed 12/22, subsequent removals and replacements with most recent placed on 1/7.  SLP cleared patient for Puree/moderately thick liquids via TSP amount only only when the patient is awake and alert; however pt unlikely to get adequate calories.  understands risks of aspiration but would like to attempt feeding.  GI consulted for PEG, ongoing GOC on final decision from family.   would like to explore any reversible causes prior to PEG placement.  However discussed w/  worsening PO intake and dysphagia likely 2/2 to progression of disease, unlikely reversible.   would like to try boosting her caloric intake with tube feed supplementation to see if that improves her ability to tolerate food.   would like to try PO feeding with TSP is patient tolerates, understands and accepts risk of aspiration.  After patient had not had much recovery in functional status with feeding, palliative care discussion occurred on 1/10 with  requesting PEG placement after risks and benefits were discussed.   would ultimately desire that patient regains some functional capacity, and would consider taking patient home with health aid or rehabilitation as well.  S/p PEG 1/11, cleared by GI for use, will now hold oral feeding pending further discussions with  about pleasure feeds  - Continue tube feeds   - Glucerna 1.2 @ starting at 10ml/hr and advance by [id number] or as tolerated to goal rate of 50 mL/hr x 24hrs  - Considering transition to bolus feeds once tolerating continuous feeds at goal rate, bolus feeds of Glucerna 1.2 @ 237ml (1 can) 5x/daily  - Will hold on PO feeding with TSP  - Palliative care consulted  - Appreciate GI PEG placement Hx of dysphagia since 2-3 months ago per son. Notable for severe b/l ext contracture and cachexia. Low PO intake at baseline, no PO intake in the last 2-3 days. NGT placed 12/22, subsequent removals and replacements with most recent placed on 1/7.  SLP cleared patient for Puree/moderately thick liquids via TSP amount only only when the patient is awake and alert; however pt unlikely to get adequate calories.  understands risks of aspiration but would like to attempt feeding.  GI consulted for PEG, ongoing GOC on final decision from family.   would like to explore any reversible causes prior to PEG placement.  However discussed w/  worsening PO intake and dysphagia likely 2/2 to progression of disease, unlikely reversible.   would like to try boosting her caloric intake with tube feed supplementation to see if that improves her ability to tolerate food.   would like to try PO feeding with TSP is patient tolerates, understands and accepts risk of aspiration.  After patient had not had much recovery in functional status with feeding, palliative care discussion occurred on 1/10 with  requesting PEG placement after risks and benefits were discussed.   would ultimately desire that patient regains some functional capacity, and would consider taking patient home with health aid or rehabilitation as well.  S/p PEG 1/11, cleared by GI for use, will now hold oral feeding pending further discussions with  about pleasure feeds  - Continue tube feeds   - Glucerna 1.2 @ starting at 10ml/hr and advance by [id number] or as tolerated to goal rate of 50 mL/hr x 24hrs  - Considering transition to bolus feeds once tolerating continuous feeds at goal rate, bolus feeds of Glucerna 1.2 @ 237ml (1 can) 5x/daily  - Will hold on PO feeding with TSP  - Palliative care consulted  - Appreciate GI PEG placement

## 2024-01-13 NOTE — PROGRESS NOTE ADULT - NUTRITIONAL ASSESSMENT
This patient has been assessed with a concern for Malnutrition and has been determined to have a diagnosis/diagnoses of Severe protein-calorie malnutrition and Underweight (BMI < 19).    This patient is being managed with:   Diet NPO with Tube Feed-  Tube Feeding Modality: Gastrostomy  Glucerna 1.2 Vidal (GLUCERNARTH)  Continuous  Starting Tube Feed Rate {mL per Hour}: 10  Increase Tube Feed Rate by (mL): 10     Every 4 hours  Until Goal Tube Feed Rate (mL per Hour): 50  Tube Feed Duration (in Hours): 24  Tube Feed Start Time: 11:30  Carlos(7 Gm Arginine/7 Gm Glut/1.2 Gm HMB     Qty per Day:  2  Entered: Jan 12 2024 11:09AM

## 2024-01-14 LAB
ALBUMIN SERPL ELPH-MCNC: 2.9 G/DL — LOW (ref 3.3–5)
ALBUMIN SERPL ELPH-MCNC: 2.9 G/DL — LOW (ref 3.3–5)
ALP SERPL-CCNC: 70 U/L — SIGNIFICANT CHANGE UP (ref 40–120)
ALP SERPL-CCNC: 70 U/L — SIGNIFICANT CHANGE UP (ref 40–120)
ALT FLD-CCNC: 9 U/L — LOW (ref 10–45)
ALT FLD-CCNC: 9 U/L — LOW (ref 10–45)
ANION GAP SERPL CALC-SCNC: 10 MMOL/L — SIGNIFICANT CHANGE UP (ref 5–17)
ANION GAP SERPL CALC-SCNC: 10 MMOL/L — SIGNIFICANT CHANGE UP (ref 5–17)
AST SERPL-CCNC: 11 U/L — SIGNIFICANT CHANGE UP (ref 10–40)
AST SERPL-CCNC: 11 U/L — SIGNIFICANT CHANGE UP (ref 10–40)
BASOPHILS # BLD AUTO: 0.01 K/UL — SIGNIFICANT CHANGE UP (ref 0–0.2)
BASOPHILS # BLD AUTO: 0.01 K/UL — SIGNIFICANT CHANGE UP (ref 0–0.2)
BASOPHILS NFR BLD AUTO: 0.1 % — SIGNIFICANT CHANGE UP (ref 0–2)
BASOPHILS NFR BLD AUTO: 0.1 % — SIGNIFICANT CHANGE UP (ref 0–2)
BILIRUB SERPL-MCNC: 0.4 MG/DL — SIGNIFICANT CHANGE UP (ref 0.2–1.2)
BILIRUB SERPL-MCNC: 0.4 MG/DL — SIGNIFICANT CHANGE UP (ref 0.2–1.2)
BUN SERPL-MCNC: 12 MG/DL — SIGNIFICANT CHANGE UP (ref 7–23)
BUN SERPL-MCNC: 12 MG/DL — SIGNIFICANT CHANGE UP (ref 7–23)
CALCIUM SERPL-MCNC: 8.4 MG/DL — SIGNIFICANT CHANGE UP (ref 8.4–10.5)
CALCIUM SERPL-MCNC: 8.4 MG/DL — SIGNIFICANT CHANGE UP (ref 8.4–10.5)
CHLORIDE SERPL-SCNC: 102 MMOL/L — SIGNIFICANT CHANGE UP (ref 96–108)
CHLORIDE SERPL-SCNC: 102 MMOL/L — SIGNIFICANT CHANGE UP (ref 96–108)
CO2 SERPL-SCNC: 27 MMOL/L — SIGNIFICANT CHANGE UP (ref 22–31)
CO2 SERPL-SCNC: 27 MMOL/L — SIGNIFICANT CHANGE UP (ref 22–31)
CREAT SERPL-MCNC: 0.33 MG/DL — LOW (ref 0.5–1.3)
CREAT SERPL-MCNC: 0.33 MG/DL — LOW (ref 0.5–1.3)
EGFR: 111 ML/MIN/1.73M2 — SIGNIFICANT CHANGE UP
EGFR: 111 ML/MIN/1.73M2 — SIGNIFICANT CHANGE UP
EOSINOPHIL # BLD AUTO: 0.09 K/UL — SIGNIFICANT CHANGE UP (ref 0–0.5)
EOSINOPHIL # BLD AUTO: 0.09 K/UL — SIGNIFICANT CHANGE UP (ref 0–0.5)
EOSINOPHIL NFR BLD AUTO: 1.2 % — SIGNIFICANT CHANGE UP (ref 0–6)
EOSINOPHIL NFR BLD AUTO: 1.2 % — SIGNIFICANT CHANGE UP (ref 0–6)
GLUCOSE BLDC GLUCOMTR-MCNC: 115 MG/DL — HIGH (ref 70–99)
GLUCOSE BLDC GLUCOMTR-MCNC: 115 MG/DL — HIGH (ref 70–99)
GLUCOSE BLDC GLUCOMTR-MCNC: 139 MG/DL — HIGH (ref 70–99)
GLUCOSE BLDC GLUCOMTR-MCNC: 139 MG/DL — HIGH (ref 70–99)
GLUCOSE BLDC GLUCOMTR-MCNC: 249 MG/DL — HIGH (ref 70–99)
GLUCOSE BLDC GLUCOMTR-MCNC: 249 MG/DL — HIGH (ref 70–99)
GLUCOSE SERPL-MCNC: 123 MG/DL — HIGH (ref 70–99)
GLUCOSE SERPL-MCNC: 123 MG/DL — HIGH (ref 70–99)
HCT VFR BLD CALC: 31.7 % — LOW (ref 34.5–45)
HCT VFR BLD CALC: 31.7 % — LOW (ref 34.5–45)
HGB BLD-MCNC: 9.8 G/DL — LOW (ref 11.5–15.5)
HGB BLD-MCNC: 9.8 G/DL — LOW (ref 11.5–15.5)
IMM GRANULOCYTES NFR BLD AUTO: 0.5 % — SIGNIFICANT CHANGE UP (ref 0–0.9)
IMM GRANULOCYTES NFR BLD AUTO: 0.5 % — SIGNIFICANT CHANGE UP (ref 0–0.9)
LYMPHOCYTES # BLD AUTO: 1.01 K/UL — SIGNIFICANT CHANGE UP (ref 1–3.3)
LYMPHOCYTES # BLD AUTO: 1.01 K/UL — SIGNIFICANT CHANGE UP (ref 1–3.3)
LYMPHOCYTES # BLD AUTO: 13.6 % — SIGNIFICANT CHANGE UP (ref 13–44)
LYMPHOCYTES # BLD AUTO: 13.6 % — SIGNIFICANT CHANGE UP (ref 13–44)
MAGNESIUM SERPL-MCNC: 2.2 MG/DL — SIGNIFICANT CHANGE UP (ref 1.6–2.6)
MAGNESIUM SERPL-MCNC: 2.2 MG/DL — SIGNIFICANT CHANGE UP (ref 1.6–2.6)
MCHC RBC-ENTMCNC: 26.7 PG — LOW (ref 27–34)
MCHC RBC-ENTMCNC: 26.7 PG — LOW (ref 27–34)
MCHC RBC-ENTMCNC: 30.9 GM/DL — LOW (ref 32–36)
MCHC RBC-ENTMCNC: 30.9 GM/DL — LOW (ref 32–36)
MCV RBC AUTO: 86.4 FL — SIGNIFICANT CHANGE UP (ref 80–100)
MCV RBC AUTO: 86.4 FL — SIGNIFICANT CHANGE UP (ref 80–100)
MONOCYTES # BLD AUTO: 0.52 K/UL — SIGNIFICANT CHANGE UP (ref 0–0.9)
MONOCYTES # BLD AUTO: 0.52 K/UL — SIGNIFICANT CHANGE UP (ref 0–0.9)
MONOCYTES NFR BLD AUTO: 7 % — SIGNIFICANT CHANGE UP (ref 2–14)
MONOCYTES NFR BLD AUTO: 7 % — SIGNIFICANT CHANGE UP (ref 2–14)
NEUTROPHILS # BLD AUTO: 5.73 K/UL — SIGNIFICANT CHANGE UP (ref 1.8–7.4)
NEUTROPHILS # BLD AUTO: 5.73 K/UL — SIGNIFICANT CHANGE UP (ref 1.8–7.4)
NEUTROPHILS NFR BLD AUTO: 77.6 % — HIGH (ref 43–77)
NEUTROPHILS NFR BLD AUTO: 77.6 % — HIGH (ref 43–77)
NRBC # BLD: 0 /100 WBCS — SIGNIFICANT CHANGE UP (ref 0–0)
NRBC # BLD: 0 /100 WBCS — SIGNIFICANT CHANGE UP (ref 0–0)
PHOSPHATE SERPL-MCNC: 2.7 MG/DL — SIGNIFICANT CHANGE UP (ref 2.5–4.5)
PHOSPHATE SERPL-MCNC: 2.7 MG/DL — SIGNIFICANT CHANGE UP (ref 2.5–4.5)
PLATELET # BLD AUTO: 328 K/UL — SIGNIFICANT CHANGE UP (ref 150–400)
PLATELET # BLD AUTO: 328 K/UL — SIGNIFICANT CHANGE UP (ref 150–400)
POTASSIUM SERPL-MCNC: 4.2 MMOL/L — SIGNIFICANT CHANGE UP (ref 3.5–5.3)
POTASSIUM SERPL-MCNC: 4.2 MMOL/L — SIGNIFICANT CHANGE UP (ref 3.5–5.3)
POTASSIUM SERPL-SCNC: 4.2 MMOL/L — SIGNIFICANT CHANGE UP (ref 3.5–5.3)
POTASSIUM SERPL-SCNC: 4.2 MMOL/L — SIGNIFICANT CHANGE UP (ref 3.5–5.3)
PROT SERPL-MCNC: 6.3 G/DL — SIGNIFICANT CHANGE UP (ref 6–8.3)
PROT SERPL-MCNC: 6.3 G/DL — SIGNIFICANT CHANGE UP (ref 6–8.3)
RBC # BLD: 3.67 M/UL — LOW (ref 3.8–5.2)
RBC # BLD: 3.67 M/UL — LOW (ref 3.8–5.2)
RBC # FLD: 17.3 % — HIGH (ref 10.3–14.5)
RBC # FLD: 17.3 % — HIGH (ref 10.3–14.5)
SODIUM SERPL-SCNC: 139 MMOL/L — SIGNIFICANT CHANGE UP (ref 135–145)
SODIUM SERPL-SCNC: 139 MMOL/L — SIGNIFICANT CHANGE UP (ref 135–145)
WBC # BLD: 7.4 K/UL — SIGNIFICANT CHANGE UP (ref 3.8–10.5)
WBC # BLD: 7.4 K/UL — SIGNIFICANT CHANGE UP (ref 3.8–10.5)
WBC # FLD AUTO: 7.4 K/UL — SIGNIFICANT CHANGE UP (ref 3.8–10.5)
WBC # FLD AUTO: 7.4 K/UL — SIGNIFICANT CHANGE UP (ref 3.8–10.5)

## 2024-01-14 PROCEDURE — 99232 SBSQ HOSP IP/OBS MODERATE 35: CPT

## 2024-01-14 RX ORDER — SODIUM CHLORIDE 9 MG/ML
500 INJECTION, SOLUTION INTRAVENOUS
Refills: 0 | Status: DISCONTINUED | OUTPATIENT
Start: 2024-01-14 | End: 2024-01-17

## 2024-01-14 RX ADMIN — Medication 1 APPLICATION(S): at 12:40

## 2024-01-14 RX ADMIN — Medication 56 MICROGRAM(S): at 21:54

## 2024-01-14 RX ADMIN — Medication 1 APPLICATION(S): at 14:42

## 2024-01-14 RX ADMIN — Medication 1 APPLICATION(S): at 21:51

## 2024-01-14 RX ADMIN — SENNA PLUS 2 TABLET(S): 8.6 TABLET ORAL at 21:51

## 2024-01-14 RX ADMIN — ENOXAPARIN SODIUM 40 MILLIGRAM(S): 100 INJECTION SUBCUTANEOUS at 14:51

## 2024-01-14 RX ADMIN — SERTRALINE 25 MILLIGRAM(S): 25 TABLET, FILM COATED ORAL at 11:56

## 2024-01-14 RX ADMIN — Medication 1 MILLIGRAM(S): at 11:56

## 2024-01-14 RX ADMIN — Medication 100 MILLIGRAM(S): at 11:56

## 2024-01-14 RX ADMIN — CHLORHEXIDINE GLUCONATE 1 APPLICATION(S): 213 SOLUTION TOPICAL at 12:39

## 2024-01-14 RX ADMIN — POLYETHYLENE GLYCOL 3350 17 GRAM(S): 17 POWDER, FOR SOLUTION ORAL at 11:56

## 2024-01-14 RX ADMIN — PANTOPRAZOLE SODIUM 40 MILLIGRAM(S): 20 TABLET, DELAYED RELEASE ORAL at 11:56

## 2024-01-14 RX ADMIN — Medication 1 APPLICATION(S): at 05:15

## 2024-01-14 RX ADMIN — Medication 1 TABLET(S): at 11:56

## 2024-01-14 RX ADMIN — Medication 500 MILLIGRAM(S): at 11:56

## 2024-01-14 NOTE — PROVIDER CONTACT NOTE (OTHER) - ASSESSMENT
AXOx0; disoriented X4; nonverbal. Pt is RA. Pt receiving continuous tube feeding @ 50 mL/hr via PEG tube. No signs/symptoms of acute distress or chest pain noted.

## 2024-01-14 NOTE — PROVIDER CONTACT NOTE (OTHER) - SITUATION
NG tube flushing with resistance
Pt's axillary temp is 100.5
Pt's NG tube is clogged.
Rapid Response called for hypoxia O2 saturation in 70's and hypotension
NGT needs advancing. tube feeds held at this time
Pt scheduled to receive IV Push Synthroid but did not receive because IV was leaking, tried to place a new IV x2, was not successful.
NG tube dislodged
Pt Bp 88/58

## 2024-01-14 NOTE — PROVIDER CONTACT NOTE (OTHER) - DATE AND TIME:
28-Dec-2023 14:15
07-Jan-2024 08:35
29-Dec-2023 02:30
29-Dec-2023 09:09
14-Jan-2024 12:30
27-Dec-2023 20:55
23-Dec-2023 05:57
26-Dec-2023 12:44

## 2024-01-14 NOTE — PROVIDER CONTACT NOTE (OTHER) - BACKGROUND
Patient admitted with hypernatremia, severe malnutrition
Pt admitted for hyperosmolality and hypernatremia
Patient is A&Ox0, nonverbal. Had the NG Tube placed through the right nostril on 12/22/23. Pt's current  is NPO, with tube feeds at a goal at 55.
Pt s/p RRT for hypotension @630am. s/p albumin and 500cc bolus
pt admitted with hyperosmolality and hypernatremia
Pt is A&Ox0, nonverbal. Has NG Tube feed through R nostril.
Pt is a 68 y.o F, admitted for hyperosmolality with hypernatremia. PMHx breast cancer s/p L mastectomy, TBI (s/p a fall- 2 yrs ago), Graves dx s/p radioactive iodine, CVA.

## 2024-01-14 NOTE — PROGRESS NOTE ADULT - ASSESSMENT
Thank you for choosing [facility] for your  care today  If you have any questions about your ultrasound or care, please do not hesitate to contact us or your primary obstetrician  At this time, no additional ultrasounds are advised through the  center, however, if your doctors would like you to have any additional ultrasounds, they will let us know  If your hemoglobin is low you should receive iron transfusions  Suicide Prevention for Adults   WHAT YOU NEED TO KNOW:   A person may see suicide as the only way to escape emotional or physical pain and suffering  You can help provide emotional support for him or her and get the help he or she needs  Learn to recognize warning signs that the person may be considering suicide  Find resources to help prevent him or her from attempting to take his or her life  DISCHARGE INSTRUCTIONS:   Call 911 if:   · The person has done something on purpose to hurt himself or herself  · The person tries to commit suicide  Seek care immediately if:   · The person tells you he or she made a plan to commit suicide  · The person acts out in anger, is reckless, or is abusing alcohol or drugs  · The person has serious thoughts of suicide, even with treatment  Contact the person's healthcare provider or therapist if:   · You begin to see warning signs that the person may be considering suicide  · The person has intense feelings of sadness, anger, revenge, or despair, or he cannot make decisions easily  · The person tells you he or she has more thoughts of suicide when they are alone  · The person withdraws from others  · The person stops eating, or begins to smoke or drink heavily  · The person feels he or she is a burden because of a disability or disease  · The person has trouble dealing with stress, such as a breakup or a job loss  · You have questions or concerns about the person's condition or care    What to do if the person is having thoughts of suicide:  Call 911 if you feel the person is at immediate risk of suicide, or if he or she talks about an active suicide plan  Assume that the person intends to carry out his or her plan  Resources are available to help you and the person  The following are some things you can do:  · Call the [address] [place] [address] at 3-298-409-TALK (8755)   · Call the Suicide Hotline at 5-349-UJOKUZI (1-551.313.1615)   · Contact the person's therapist  His or her healthcare provider can give you a list of therapists if he or she does not have one  · Keep medicines, weapons, and alcohol out of the person's reach  Do not leave the person alone if he or she says they want to commit suicide  Do not leave the person alone if you think he or she may try it  Make sure you do not put yourself at risk if the person has a weapon  · Do not be afraid to ask if the person is thinking of ending his or her life  Ask if the person has a plan for hurting or killing himself or herself  Warning signs to watch for:   · Talking about a plan for committing suicide, or suddenly deciding to make a will    · Cutting himself or herself, burning the skin with cigarettes, or driving recklessly    · Drug or alcohol use, not taking prescribed medicine, or taking too much prescribed medicine    · Sudden anger, lashing out at others, or seeming hopeless, anxious, or angry and then suddenly becoming happy or peaceful    · Not wanting to spend time with others or doing things he or she usually enjoys    · Trouble at work, or not showing up for work    · A change in the way he or she eats, sleeps, or dresses    · Weight gain or loss or having less energy than usual    · Trouble sleeping or spending a lot of time sleeping    · Giving away or throwing away his or her belongings  Treatments the person may need:   · Medicines  may be given to prevent mood swings, or to decrease anxiety or depression  The person will need to take all medicines as directed  A sudden stop can be harmful  It may take 4 to 6 weeks for the medicine to help him or her feel better  · A therapist  can help the person identify and change negative feelings or beliefs about himself or herself  This may also help change the way the he or she feels and acts  A therapist can also help the person find ways to cope with things that cannot be changed  Follow up with the person's healthcare provider and therapist as directed:  Write down your questions so you remember to ask them during your visits  What you can do to help the person:   · Encourage the person to seek help for drug or alcohol abuse  Drugs and alcohol can increase suicidal thoughts and make the person more likely to act on them  · Help the person connect with others  Encourage him or her to become involved in the community  Some examples include tutoring a young student, volunteering at a [employer], or joining a group exercise program  The person may need help setting up a computer or creating an e-mail account to help him or her remain connected to others  · Exercise with the person  Exercise can lift his or her mood, increase energy, and make it easier to sleep at night  · Encourage the person to try new things  Adults who are open to new experiences handle stress and change better than those who are not  · Call, visit, or send postcards to the person often  Check on him or her after the loss of a pet, longtime friend, or child  Holidays, birthdays, and anniversaries can be difficult for a person after a loss  The loss of a spouse can be especially painful and lonely  · Help the person schedule a visit with his or her Pentecostal or spiritual leader  A Pentecostal or spiritual leader may be able to offer additional support and resources to the person  · Help the person get equipment that will increase his or her comfort and mobility  Examples are hearing aids, glasses, large print books, and walkers  These can help him or her enjoy activities and feel more independent  · Encourage the person to continue taking medicine and going to therapy  Medicine and therapy can help improve his or her mental health  For support and more information:   · [facility] , [address] [place] [address]  Phone: 7- 362 - 788-PVZL (01 [phone])  Web Address: [employer]  · Suicide Awareness Voices of Education  [address] [place] [address] [address] , [address]  Phone: 8- 143 - 887-3230  Web Address: [employer] br  org  © 2017 [address] Information is for End User's use only and may not be sold, redistributed or otherwise used for commercial purposes  All illustrations and images included in CareNotes® are the copyrighted property of [employer] , [employer]  or Jesse Turner  The above information is an  only  It is not intended as medical advice for individual conditions or treatments  Talk to your doctor, nurse or pharmacist before following any medical regimen to see if it is safe and effective for you  68F with PMH significant for breast cancer s/p L mastectomy, TBI (s/p a fall ~2 years ago) c/b aphasia, meningioma, hypothyroidism (hx Grave's dz s/p radioactive iodine), CVA, and dysphagia who presents to [facility] ED on 12/20/2023 with no PO intake and increased lethargy in the last 2-3 days, admitted for hypernatremia 166 and UTI. Hospital course complicated by AHRF likely 2/2 to aspiration pneumonitis, pending PEG evaluation   68F with PMH significant for breast cancer s/p L mastectomy, TBI (s/p a fall ~2 years ago) c/b aphasia, meningioma, hypothyroidism (hx Grave's dz s/p radioactive iodine), CVA, and dysphagia who presents to [facility] ED on 12/20/2023 with no PO intake and increased lethargy in the last 2-3 days, admitted for hypernatremia 166 and UTI. Hospital course complicated by AHRF likely 2/2 to aspiration pneumonitis, pending PEG evaluation

## 2024-01-14 NOTE — PROVIDER CONTACT NOTE (OTHER) - REASON
Pt Bp 88/58
NG tube flushing with resistance
Pt scheduled to receive IV Push Synthroid but did not receive because IV was leaking, tried to place a new IV x2, was not successful.
Rapid Response called for hypoxia and hypotension
NGT needs advancing
Clogged NG Tube
Fever
NG tube dislodged

## 2024-01-14 NOTE — PROVIDER CONTACT NOTE (OTHER) - NAME OF MD/NP/PA/DO NOTIFIED:
LADI Craig
MD Sahil Lucas
MD Quinones
MD Reyes
LADI Craig
Usha Reyes ACP
Jovanna Garza MD
Rapid response team

## 2024-01-14 NOTE — PROGRESS NOTE ADULT - SUBJECTIVE AND OBJECTIVE BOX
***************************************************************  Bassem Hernandez, PG1  Internal Medicine   Pager:  TEAMS  ***************************************************************    JONATHAN YOUNGER  68y  MRN: [id number]    Patient is a 68y old  Female who presents with a chief complaint of No PO intake, lethargy (13 Jan 2024 07:25)      Interval/Overnight Events: no events ON.     Subjective: Pt seen and examined at bedside. Denies fever, CP, SOB, abn pain, N/V, dysuria. Tolerating diet.      MEDICATIONS  (STANDING):  ascorbic acid 500 milliGRAM(s) Oral daily  chlorhexidine 2% Cloths 1 Application(s) Topical daily  Dakins Solution - 1/4 Strength 1 Application(s) Topical every 8 hours  enoxaparin Injectable 40 milliGRAM(s) SubCutaneous every 24 hours  folic acid 1 milliGRAM(s) Oral daily  lactated ringers. 500 milliLiter(s) (100 mL/Hr) IV Continuous <Continuous>  levothyroxine Injectable 56 MICROGram(s) IV Push at bedtime  multivitamin 1 Tablet(s) Oral daily  pantoprazole   Suspension 40 milliGRAM(s) Oral daily  polyethylene glycol 3350 17 Gram(s) Oral daily  povidone iodine 10% Solution 1 Application(s) Topical daily  senna 2 Tablet(s) Oral at bedtime  sertraline 25 milliGRAM(s) Oral daily  sodium chloride 0.9%. 500 milliLiter(s) (30 mL/Hr) IV Continuous <Continuous>  thiamine 100 milliGRAM(s) Oral daily    MEDICATIONS  (PRN):      Objective:    Vitals: Vital Signs Last 24 Hrs  T(C): 36.4 (01-14-24 @ 05:58), Max: 36.7 (01-13-24 @ 10:00)  T(F): 97.6 (01-14-24 @ 05:58), Max: 98 (01-13-24 @ 10:00)  HR: 78 (01-14-24 @ 05:58) (75 - 80)  BP: 98/61 (01-14-24 @ 05:58) (98/61 - 116/71)  BP(mean): --  RR: 18 (01-14-24 @ 05:58) (18 - 18)  SpO2: 98% (01-14-24 @ 05:58) (97% - 98%)                I&O's Summary    13 Jan 2024 07:01  -  14 Jan 2024 06:27  --------------------------------------------------------  IN: 0 mL / OUT: 300 mL / NET: -300 mL        PHYSICAL EXAM:  GENERAL: NAD, cachetic, chronically ill appearing   HEAD:  Atraumatic, Normocephalic  EYES: PERRL, conjunctiva and sclera clear  ENMT: dry mucous membranes   NECK: Supple  CHEST/LUNG: Clear to auscultation bilaterally; No rales, rhonchi, wheezing, or rubs but difficult to assess due to contracted habitus  HEART: Regular rate and rhythm; No murmurs, rubs, or gallops  ABDOMEN: Soft, Nontender, Nondistended; Bowel sounds present  EXTREMITIES:  no clubbing, cyanosis, or edema   SKIN: No rashes or lesions  NERVOUS SYSTEM:  Alert, opens eyes to voice, contracted extremities, squeezes fingers on command  PSYCH: Unable to assess    LABS:                        9.8    9.36  )-----------( 322      ( 12 Jan 2024 07:20 )             31.4                         10.1   9.94  )-----------( 329      ( 11 Jan 2024 06:56 )             32.2     01-12    138  |  103  |  13  ----------------------------<  87  4.3   |  24  |  0.37<L>  01-11    137  |  101  |  10  ----------------------------<  101<H>  4.3   |  26  |  0.30<L>    Ca    8.6      12 Jan 2024 07:16  Ca    8.2<L>      11 Jan 2024 06:54  Phos  3.1     01-12  Mg     2.3     01-12    TPro  6.1  /  Alb  2.9<L>  /  TBili  0.7  /  DBili  x   /  AST  9<L>  /  ALT  8<L>  /  AlkPhos  60  01-12  TPro  6.3  /  Alb  2.8<L>  /  TBili  0.5  /  DBili  x   /  AST  11  /  ALT  7<L>  /  AlkPhos  63  01-11    CAPILLARY BLOOD GLUCOSE      POCT Blood Glucose.: 139 mg/dL (14 Jan 2024 05:51)  POCT Blood Glucose.: 118 mg/dL (13 Jan 2024 23:44)  POCT Blood Glucose.: 141 mg/dL (13 Jan 2024 18:44)  POCT Blood Glucose.: 131 mg/dL (13 Jan 2024 12:11)        Urinalysis Basic - ( 12 Jan 2024 07:16 )    Color: x / Appearance: x / SG: x / pH: x  Gluc: 87 mg/dL / Ketone: x  / Bili: x / Urobili: x   Blood: x / Protein: x / Nitrite: x   Leuk Esterase: x / RBC: x / WBC x   Sq Epi: x / Non Sq Epi: x / Bacteria: x          RADIOLOGY & ADDITIONAL TESTS:    Imaging Personally Reviewed:  [x ] YES  [ ] NO    Consultants involved in case:   Consultant(s) Notes Reviewed:  [ x] YES  [ ] NO:   Care Discussed with Consultants/Other Providers [x ] YES  [ ] NO         ***************************************************************  Bassem Hernandez, PG1  Internal Medicine   Pager:  TEAMS  ***************************************************************    JONATHAN YOUNGER  68y  MRN: [id number]    Patient is a 68y old  Female who presents with a chief complaint of No PO intake, lethargy (13 Jan 2024 07:25)      Interval/Overnight Events: no events ON.     Subjective: Pt seen and examined at bedside. Denies fever, CP, SOB, abn pain, N/V, dysuria. Tolerating diet.      MEDICATIONS  (STANDING):  ascorbic acid 500 milliGRAM(s) Oral daily  chlorhexidine 2% Cloths 1 Application(s) Topical daily  Dakins Solution - 1/4 Strength 1 Application(s) Topical every 8 hours  enoxaparin Injectable 40 milliGRAM(s) SubCutaneous every 24 hours  folic acid 1 milliGRAM(s) Oral daily  lactated ringers. 500 milliLiter(s) (100 mL/Hr) IV Continuous <Continuous>  levothyroxine Injectable 56 MICROGram(s) IV Push at bedtime  multivitamin 1 Tablet(s) Oral daily  pantoprazole   Suspension 40 milliGRAM(s) Oral daily  polyethylene glycol 3350 17 Gram(s) Oral daily  povidone iodine 10% Solution 1 Application(s) Topical daily  senna 2 Tablet(s) Oral at bedtime  sertraline 25 milliGRAM(s) Oral daily  sodium chloride 0.9%. 500 milliLiter(s) (30 mL/Hr) IV Continuous <Continuous>  thiamine 100 milliGRAM(s) Oral daily    MEDICATIONS  (PRN):      Objective:    Vitals: Vital Signs Last 24 Hrs  T(C): 36.4 (01-14-24 @ 05:58), Max: 36.7 (01-13-24 @ 10:00)  T(F): 97.6 (01-14-24 @ 05:58), Max: 98 (01-13-24 @ 10:00)  HR: 78 (01-14-24 @ 05:58) (75 - 80)  BP: 98/61 (01-14-24 @ 05:58) (98/61 - 116/71)  BP(mean): --  RR: 18 (01-14-24 @ 05:58) (18 - 18)  SpO2: 98% (01-14-24 @ 05:58) (97% - 98%)                I&O's Summary    13 Jan 2024 07:01  -  14 Jan 2024 06:27  --------------------------------------------------------  IN: 0 mL / OUT: 300 mL / NET: -300 mL        PHYSICAL EXAM:  GENERAL: NAD, cachetic, chronically ill appearing   HEAD:  Atraumatic, Normocephalic  EYES: PERRL, conjunctiva and sclera clear  ENMT: dry mucous membranes   NECK: Supple  CHEST/LUNG: Clear to auscultation bilaterally; No rales, rhonchi, wheezing, or rubs but difficult to assess due to contracted habitus  HEART: Regular rate and rhythm; No murmurs, rubs, or gallops  ABDOMEN: Soft, Nontender, Nondistended; Bowel sounds present  EXTREMITIES:  no clubbing, cyanosis, or edema   SKIN: No rashes or lesions  NERVOUS SYSTEM:  Alert, opens eyes to voice, contracted extremities, squeezes fingers on command  PSYCH: Unable to assess    LABS:                        9.8    9.36  )-----------( 322      ( 12 Jan 2024 07:20 )             31.4                         10.1   9.94  )-----------( 329      ( 11 Jan 2024 06:56 )             32.2     01-12    138  |  103  |  13  ----------------------------<  87  4.3   |  24  |  0.37<L>  01-11    137  |  101  |  10  ----------------------------<  101<H>  4.3   |  26  |  0.30<L>    Ca    8.6      12 Jan 2024 07:16  Ca    8.2<L>      11 Jan 2024 06:54  Phos  3.1     01-12  Mg     2.3     01-12    TPro  6.1  /  Alb  2.9<L>  /  TBili  0.7  /  DBili  x   /  AST  9<L>  /  ALT  8<L>  /  AlkPhos  60  01-12  TPro  6.3  /  Alb  2.8<L>  /  TBili  0.5  /  DBili  x   /  AST  11  /  ALT  7<L>  /  AlkPhos  63  01-11    CAPILLARY BLOOD GLUCOSE      POCT Blood Glucose.: 139 mg/dL (14 Jan 2024 05:51)  POCT Blood Glucose.: 118 mg/dL (13 Jan 2024 23:44)  POCT Blood Glucose.: 141 mg/dL (13 Jan 2024 18:44)  POCT Blood Glucose.: 131 mg/dL (13 Jan 2024 12:11)        Urinalysis Basic - ( 12 Jan 2024 07:16 )    Color: x / Appearance: x / SG: x / pH: x  Gluc: 87 mg/dL / Ketone: x  / Bili: x / Urobili: x   Blood: x / Protein: x / Nitrite: x   Leuk Esterase: x / RBC: x / WBC x   Sq Epi: x / Non Sq Epi: x / Bacteria: x          RADIOLOGY & ADDITIONAL TESTS:    Imaging Personally Reviewed:  [x ] YES  [ ] NO    Consultants involved in case:   Consultant(s) Notes Reviewed:  [ x] YES  [ ] NO:   Care Discussed with Consultants/Other Providers [x ] YES  [ ] NO         ***************************************************************  Bassem Hernandez, PG1  Internal Medicine   Pager:  TEAMS  ***************************************************************    JONATHAN YOUNGER  68y  MRN: [id number]    Patient is a 68y old  Female who presents with a chief complaint of No PO intake, lethargy (13 Jan 2024 07:25)      Interval/Overnight Events: no events ON.     Subjective: Pt seen and examined at bedside. S/p PEG placement.  Awakens to voice, squeezes fingers on command.  Spoke extensively with dinorah from [place] and his fiancee at bedside.  Answered all questions.    MEDICATIONS  (STANDING):  ascorbic acid 500 milliGRAM(s) Oral daily  chlorhexidine 2% Cloths 1 Application(s) Topical daily  Dakins Solution - 1/4 Strength 1 Application(s) Topical every 8 hours  enoxaparin Injectable 40 milliGRAM(s) SubCutaneous every 24 hours  folic acid 1 milliGRAM(s) Oral daily  lactated ringers. 500 milliLiter(s) (100 mL/Hr) IV Continuous <Continuous>  levothyroxine Injectable 56 MICROGram(s) IV Push at bedtime  multivitamin 1 Tablet(s) Oral daily  pantoprazole   Suspension 40 milliGRAM(s) Oral daily  polyethylene glycol 3350 17 Gram(s) Oral daily  povidone iodine 10% Solution 1 Application(s) Topical daily  senna 2 Tablet(s) Oral at bedtime  sertraline 25 milliGRAM(s) Oral daily  sodium chloride 0.9%. 500 milliLiter(s) (30 mL/Hr) IV Continuous <Continuous>  thiamine 100 milliGRAM(s) Oral daily    MEDICATIONS  (PRN):      Objective:    Vitals: Vital Signs Last 24 Hrs  T(C): 36.4 (01-14-24 @ 05:58), Max: 36.7 (01-13-24 @ 10:00)  T(F): 97.6 (01-14-24 @ 05:58), Max: 98 (01-13-24 @ 10:00)  HR: 78 (01-14-24 @ 05:58) (75 - 80)  BP: 98/61 (01-14-24 @ 05:58) (98/61 - 116/71)  BP(mean): --  RR: 18 (01-14-24 @ 05:58) (18 - 18)  SpO2: 98% (01-14-24 @ 05:58) (97% - 98%)                I&O's Summary    13 Jan 2024 07:01  -  14 Jan 2024 06:27  --------------------------------------------------------  IN: 0 mL / OUT: 300 mL / NET: -300 mL        PHYSICAL EXAM:  GENERAL: NAD, cachetic, chronically ill appearing   HEAD:  Atraumatic, Normocephalic  EYES: PERRL, conjunctiva and sclera clear  ENMT: dry mucous membranes   NECK: Supple  CHEST/LUNG: Clear to auscultation bilaterally; No rales, rhonchi, wheezing, or rubs but difficult to assess due to contracted habitus  HEART: Regular rate and rhythm; No murmurs, rubs, or gallops  ABDOMEN: Soft, Nontender, Nondistended; Bowel sounds present  EXTREMITIES:  no clubbing, cyanosis, or edema   SKIN: No rashes or lesions  NERVOUS SYSTEM:  Alert, opens eyes to voice, contracted extremities, squeezes fingers on command  PSYCH: Unable to assess    LABS:                        9.8    9.36  )-----------( 322      ( 12 Jan 2024 07:20 )             31.4                         10.1   9.94  )-----------( 329      ( 11 Jan 2024 06:56 )             32.2     01-12    138  |  103  |  13  ----------------------------<  87  4.3   |  24  |  0.37<L>  01-11    137  |  101  |  10  ----------------------------<  101<H>  4.3   |  26  |  0.30<L>    Ca    8.6      12 Jan 2024 07:16  Ca    8.2<L>      11 Jan 2024 06:54  Phos  3.1     01-12  Mg     2.3     01-12    TPro  6.1  /  Alb  2.9<L>  /  TBili  0.7  /  DBili  x   /  AST  9<L>  /  ALT  8<L>  /  AlkPhos  60  01-12  TPro  6.3  /  Alb  2.8<L>  /  TBili  0.5  /  DBili  x   /  AST  11  /  ALT  7<L>  /  AlkPhos  63  01-11    CAPILLARY BLOOD GLUCOSE      POCT Blood Glucose.: 139 mg/dL (14 Jan 2024 05:51)  POCT Blood Glucose.: 118 mg/dL (13 Jan 2024 23:44)  POCT Blood Glucose.: 141 mg/dL (13 Jan 2024 18:44)  POCT Blood Glucose.: 131 mg/dL (13 Jan 2024 12:11)        Urinalysis Basic - ( 12 Jan 2024 07:16 )    Color: x / Appearance: x / SG: x / pH: x  Gluc: 87 mg/dL / Ketone: x  / Bili: x / Urobili: x   Blood: x / Protein: x / Nitrite: x   Leuk Esterase: x / RBC: x / WBC x   Sq Epi: x / Non Sq Epi: x / Bacteria: x          RADIOLOGY & ADDITIONAL TESTS:    Imaging Personally Reviewed:  [x ] YES  [ ] NO    Consultants involved in case:   Consultant(s) Notes Reviewed:  [ x] YES  [ ] NO:   Care Discussed with Consultants/Other Providers [x ] YES  [ ] NO         ***************************************************************  Bassem Hernandez, PG1  Internal Medicine   Pager:  TEAMS  ***************************************************************    JONATHAN YOUNGER  68y  MRN: [id number]    Patient is a 68y old  Female who presents with a chief complaint of No PO intake, lethargy (13 Jan 2024 07:25)      Interval/Overnight Events: no events ON.     Subjective: Pt seen and examined at bedside. S/p PEG placement.  Awakens to voice, squeezes fingers on command.  Spoke extensively with dinorah from [place] and his fiancee at bedside.  Answered all questions.    MEDICATIONS  (STANDING):  ascorbic acid 500 milliGRAM(s) Oral daily  chlorhexidine 2% Cloths 1 Application(s) Topical daily  Dakins Solution - 1/4 Strength 1 Application(s) Topical every 8 hours  enoxaparin Injectable 40 milliGRAM(s) SubCutaneous every 24 hours  folic acid 1 milliGRAM(s) Oral daily  lactated ringers. 500 milliLiter(s) (100 mL/Hr) IV Continuous <Continuous>  levothyroxine Injectable 56 MICROGram(s) IV Push at bedtime  multivitamin 1 Tablet(s) Oral daily  pantoprazole   Suspension 40 milliGRAM(s) Oral daily  polyethylene glycol 3350 17 Gram(s) Oral daily  povidone iodine 10% Solution 1 Application(s) Topical daily  senna 2 Tablet(s) Oral at bedtime  sertraline 25 milliGRAM(s) Oral daily  sodium chloride 0.9%. 500 milliLiter(s) (30 mL/Hr) IV Continuous <Continuous>  thiamine 100 milliGRAM(s) Oral daily    MEDICATIONS  (PRN):      Objective:    Vitals: Vital Signs Last 24 Hrs  T(C): 36.4 (01-14-24 @ 05:58), Max: 36.7 (01-13-24 @ 10:00)  T(F): 97.6 (01-14-24 @ 05:58), Max: 98 (01-13-24 @ 10:00)  HR: 78 (01-14-24 @ 05:58) (75 - 80)  BP: 98/61 (01-14-24 @ 05:58) (98/61 - 116/71)  BP(mean): --  RR: 18 (01-14-24 @ 05:58) (18 - 18)  SpO2: 98% (01-14-24 @ 05:58) (97% - 98%)                I&O's Summary    13 Jan 2024 07:01  -  14 Jan 2024 06:27  --------------------------------------------------------  IN: 0 mL / OUT: 300 mL / NET: -300 mL        PHYSICAL EXAM:  GENERAL: NAD, cachetic, chronically ill appearing   HEAD:  Atraumatic, Normocephalic  EYES: PERRL, conjunctiva and sclera clear  ENMT: dry mucous membranes   NECK: Supple  CHEST/LUNG: Clear to auscultation bilaterally; No rales, rhonchi, wheezing, or rubs but difficult to assess due to contracted habitus  HEART: Regular rate and rhythm; No murmurs, rubs, or gallops  ABDOMEN: Soft, Nontender, Nondistended; Bowel sounds present  EXTREMITIES:  no clubbing, cyanosis, or edema   SKIN: No rashes or lesions  NERVOUS SYSTEM:  Alert, opens eyes to voice, contracted extremities, squeezes fingers on command  PSYCH: Unable to assess    LABS:                        9.8    9.36  )-----------( 322      ( 12 Jan 2024 07:20 )             31.4                         10.1   9.94  )-----------( 329      ( 11 Jan 2024 06:56 )             32.2     01-12    138  |  103  |  13  ----------------------------<  87  4.3   |  24  |  0.37<L>  01-11    137  |  101  |  10  ----------------------------<  101<H>  4.3   |  26  |  0.30<L>    Ca    8.6      12 Jan 2024 07:16  Ca    8.2<L>      11 Jan 2024 06:54  Phos  3.1     01-12  Mg     2.3     01-12    TPro  6.1  /  Alb  2.9<L>  /  TBili  0.7  /  DBili  x   /  AST  9<L>  /  ALT  8<L>  /  AlkPhos  60  01-12  TPro  6.3  /  Alb  2.8<L>  /  TBili  0.5  /  DBili  x   /  AST  11  /  ALT  7<L>  /  AlkPhos  63  01-11    CAPILLARY BLOOD GLUCOSE      POCT Blood Glucose.: 139 mg/dL (14 Jan 2024 05:51)  POCT Blood Glucose.: 118 mg/dL (13 Jan 2024 23:44)  POCT Blood Glucose.: 141 mg/dL (13 Jan 2024 18:44)  POCT Blood Glucose.: 131 mg/dL (13 Jan 2024 12:11)        Urinalysis Basic - ( 12 Jan 2024 07:16 )    Color: x / Appearance: x / SG: x / pH: x  Gluc: 87 mg/dL / Ketone: x  / Bili: x / Urobili: x   Blood: x / Protein: x / Nitrite: x   Leuk Esterase: x / RBC: x / WBC x   Sq Epi: x / Non Sq Epi: x / Bacteria: x          RADIOLOGY & ADDITIONAL TESTS:    Imaging Personally Reviewed:  [x ] YES  [ ] NO    Consultants involved in case:   Consultant(s) Notes Reviewed:  [ x] YES  [ ] NO:   Care Discussed with Consultants/Other Providers [x ] YES  [ ] NO

## 2024-01-14 NOTE — PROGRESS NOTE ADULT - PROBLEM SELECTOR PLAN 1
Hx of dysphagia since 2-3 months ago per son. Notable for severe b/l ext contracture and cachexia. Low PO intake at baseline, no PO intake in the last 2-3 days. NGT placed 12/22, subsequent removals and replacements with most recent placed on 1/7.  SLP cleared patient for Puree/moderately thick liquids via TSP amount only only when the patient is awake and alert; however pt unlikely to get adequate calories.  understands risks of aspiration but would like to attempt feeding.  GI consulted for PEG, ongoing GOC on final decision from family.   would like to explore any reversible causes prior to PEG placement.  However discussed w/  worsening PO intake and dysphagia likely 2/2 to progression of disease, unlikely reversible.   would like to try boosting her caloric intake with tube feed supplementation to see if that improves her ability to tolerate food.   would like to try PO feeding with TSP is patient tolerates, understands and accepts risk of aspiration.  After patient had not had much recovery in functional status with feeding, palliative care discussion occurred on 1/10 with  requesting PEG placement after risks and benefits were discussed.   would ultimately desire that patient regains some functional capacity, and would consider taking patient home with health aid or rehabilitation as well.  S/p PEG 1/11, cleared by GI for use, will now hold oral feeding pending further discussions with  about pleasure feeds  - Continue tube feeds   - Glucerna 1.2 @ starting at 10ml/hr and advance by [id number] or as tolerated to goal rate of 50 mL/hr x 24hrs  - Considering transition to bolus feeds once tolerating continuous feeds at goal rate, bolus feeds of Glucerna 1.2 @ 237ml (1 can) 5x/daily  - Will hold on PO feeding with TSP  - Palliative care consulted  - Appreciate GI PEG placement Hx of dysphagia since 2-3 months ago per son. Notable for severe b/l ext contracture and cachexia. Low PO intake at baseline, no PO intake in the last 2-3 days. NGT placed 12/22, subsequent removals and replacements with most recent placed on 1/7.  SLP cleared patient for Puree/moderately thick liquids via TSP amount only only when the patient is awake and alert; however pt unlikely to get adequate calories.  understands risks of aspiration but would like to attempt feeding.  GI consulted for PEG, ongoing GOC on final decision from family.   would like to explore any reversible causes prior to PEG placement.  However discussed w/  worsening PO intake and dysphagia likely 2/2 to progression of disease, unlikely reversible.   would like to try boosting her caloric intake with tube feed supplementation to see if that improves her ability to tolerate food.   would like to try PO feeding with TSP is patient tolerates, understands and accepts risk of aspiration.  After patient had not had much recovery in functional status with feeding, palliative care discussion occurred on 1/10 with  requesting PEG placement after risks and benefits were discussed.   would ultimately desire that patient regains some functional capacity, and would consider taking patient home with health aid or rehabilitation as well.  S/p PEG 1/11, cleared by GI for use, will now hold oral feeding pending further discussions with  about pleasure feeds  - Continue tube feeds   - Glucerna 1.2 @ starting at 10ml/hr and advance by [id number] or as tolerated to goal rate of 50 mL/hr x 24hrs  - Considering transition to bolus feeds once tolerating continuous feeds at goal rate, bolus feeds of Glucerna 1.2 @ 237ml (1 can) 5x/daily  - Will hold on PO feeding with TSP  - Palliative care consulted  - Appreciate GI PEG placement

## 2024-01-14 NOTE — PROGRESS NOTE ADULT - ATTENDING COMMENTS
68F with PMH significant for breast cancer s/p L mastectomy, TBI (s/p a fall ~2 years ago) c/b aphasia, meningioma, hypothyroidism (hx Grave's dz s/p radioactive iodine), CVA, and dysphagia who presents to [facility] ED on 12/20/2023 with no PO intake and increased lethargy in the last 2-3 days, admitted for hypernatremia 166 and UTI. Hospital course complicated by AHRF likely 2/2 to aspiration pneumonitis, pending PEG evaluation.    Plan:   - IV access failed, will need access   - C/w with PEG tube, Glucerna   - Pending DC to skilled nursing facility     Rest of management as per resident.     VTE ppx: lovenox   Diet: Glucerna, peg tube  Dispo: Skilled Nursing facility . 68F with PMH significant for breast cancer s/p L mastectomy, TBI (s/p a fall ~2 years ago) c/b aphasia, meningioma, hypothyroidism (hx Grave's dz s/p radioactive iodine), CVA, and dysphagia who presents to [facility] ED on 12/20/2023 with no PO intake and increased lethargy in the last 2-3 days, admitted for hypernatremia 166 and UTI. Hospital course complicated by AHRF likely 2/2 to aspiration pneumonitis, pending PEG evaluation.    Plan:   - IV access failed, will need access   - C/w with PEG tube, Glucerna   - Pending DC to skilled nursing facility     Rest of management as per resident.     VTE ppx: lovenox   Diet: Glucerna, peg tube  Dispo: Skilled Nursing facility .

## 2024-01-14 NOTE — PROVIDER CONTACT NOTE (OTHER) - ACTION/TREATMENT ORDERED:
MD Reyes aware. Awaiting new orders.
CXR confirmed NG tube not in place- removed. Rectal temp elevated- 101.4. IV fluids given for low BP, ABX started for fever and cultures collected. pt suctioned, nebulizer admin placed on high flow
Pending orders.
Provider notified, IV team paged. Pt care ongoing.
MD Reyes aware, assessed and advanced NGT at bedside. Awaiting xray placement confirmation.
MD notified. MD to assess patient at bedside
Provider notified. Tylenol given. STAT cultures ordered and sent.

## 2024-01-14 NOTE — PROVIDER CONTACT NOTE (OTHER) - RECOMMENDATIONS
Tylenol pill through NG tube feed.
Notify provider.
Call rapid response
Please eval
Notified LADI Craig. Feeding has been held for now.

## 2024-01-15 LAB
ALBUMIN SERPL ELPH-MCNC: 2.9 G/DL — LOW (ref 3.3–5)
ALBUMIN SERPL ELPH-MCNC: 2.9 G/DL — LOW (ref 3.3–5)
ALP SERPL-CCNC: 73 U/L — SIGNIFICANT CHANGE UP (ref 40–120)
ALP SERPL-CCNC: 73 U/L — SIGNIFICANT CHANGE UP (ref 40–120)
ALT FLD-CCNC: 6 U/L — LOW (ref 10–45)
ALT FLD-CCNC: 6 U/L — LOW (ref 10–45)
ANION GAP SERPL CALC-SCNC: 10 MMOL/L — SIGNIFICANT CHANGE UP (ref 5–17)
ANION GAP SERPL CALC-SCNC: 10 MMOL/L — SIGNIFICANT CHANGE UP (ref 5–17)
AST SERPL-CCNC: 9 U/L — LOW (ref 10–40)
AST SERPL-CCNC: 9 U/L — LOW (ref 10–40)
BASOPHILS # BLD AUTO: 0.02 K/UL — SIGNIFICANT CHANGE UP (ref 0–0.2)
BASOPHILS # BLD AUTO: 0.02 K/UL — SIGNIFICANT CHANGE UP (ref 0–0.2)
BASOPHILS NFR BLD AUTO: 0.3 % — SIGNIFICANT CHANGE UP (ref 0–2)
BASOPHILS NFR BLD AUTO: 0.3 % — SIGNIFICANT CHANGE UP (ref 0–2)
BILIRUB SERPL-MCNC: 0.3 MG/DL — SIGNIFICANT CHANGE UP (ref 0.2–1.2)
BILIRUB SERPL-MCNC: 0.3 MG/DL — SIGNIFICANT CHANGE UP (ref 0.2–1.2)
BUN SERPL-MCNC: 10 MG/DL — SIGNIFICANT CHANGE UP (ref 7–23)
BUN SERPL-MCNC: 10 MG/DL — SIGNIFICANT CHANGE UP (ref 7–23)
CALCIUM SERPL-MCNC: 8.6 MG/DL — SIGNIFICANT CHANGE UP (ref 8.4–10.5)
CALCIUM SERPL-MCNC: 8.6 MG/DL — SIGNIFICANT CHANGE UP (ref 8.4–10.5)
CHLORIDE SERPL-SCNC: 102 MMOL/L — SIGNIFICANT CHANGE UP (ref 96–108)
CHLORIDE SERPL-SCNC: 102 MMOL/L — SIGNIFICANT CHANGE UP (ref 96–108)
CO2 SERPL-SCNC: 27 MMOL/L — SIGNIFICANT CHANGE UP (ref 22–31)
CO2 SERPL-SCNC: 27 MMOL/L — SIGNIFICANT CHANGE UP (ref 22–31)
CREAT SERPL-MCNC: 0.31 MG/DL — LOW (ref 0.5–1.3)
CREAT SERPL-MCNC: 0.31 MG/DL — LOW (ref 0.5–1.3)
EGFR: 113 ML/MIN/1.73M2 — SIGNIFICANT CHANGE UP
EGFR: 113 ML/MIN/1.73M2 — SIGNIFICANT CHANGE UP
EOSINOPHIL # BLD AUTO: 0.13 K/UL — SIGNIFICANT CHANGE UP (ref 0–0.5)
EOSINOPHIL # BLD AUTO: 0.13 K/UL — SIGNIFICANT CHANGE UP (ref 0–0.5)
EOSINOPHIL NFR BLD AUTO: 2 % — SIGNIFICANT CHANGE UP (ref 0–6)
EOSINOPHIL NFR BLD AUTO: 2 % — SIGNIFICANT CHANGE UP (ref 0–6)
GLUCOSE BLDC GLUCOMTR-MCNC: 121 MG/DL — HIGH (ref 70–99)
GLUCOSE BLDC GLUCOMTR-MCNC: 121 MG/DL — HIGH (ref 70–99)
GLUCOSE BLDC GLUCOMTR-MCNC: 135 MG/DL — HIGH (ref 70–99)
GLUCOSE BLDC GLUCOMTR-MCNC: 135 MG/DL — HIGH (ref 70–99)
GLUCOSE BLDC GLUCOMTR-MCNC: 149 MG/DL — HIGH (ref 70–99)
GLUCOSE SERPL-MCNC: 120 MG/DL — HIGH (ref 70–99)
GLUCOSE SERPL-MCNC: 120 MG/DL — HIGH (ref 70–99)
HCT VFR BLD CALC: 30.8 % — LOW (ref 34.5–45)
HCT VFR BLD CALC: 30.8 % — LOW (ref 34.5–45)
HGB BLD-MCNC: 9.7 G/DL — LOW (ref 11.5–15.5)
HGB BLD-MCNC: 9.7 G/DL — LOW (ref 11.5–15.5)
IMM GRANULOCYTES NFR BLD AUTO: 0.8 % — SIGNIFICANT CHANGE UP (ref 0–0.9)
IMM GRANULOCYTES NFR BLD AUTO: 0.8 % — SIGNIFICANT CHANGE UP (ref 0–0.9)
LYMPHOCYTES # BLD AUTO: 1.14 K/UL — SIGNIFICANT CHANGE UP (ref 1–3.3)
LYMPHOCYTES # BLD AUTO: 1.14 K/UL — SIGNIFICANT CHANGE UP (ref 1–3.3)
LYMPHOCYTES # BLD AUTO: 17.2 % — SIGNIFICANT CHANGE UP (ref 13–44)
LYMPHOCYTES # BLD AUTO: 17.2 % — SIGNIFICANT CHANGE UP (ref 13–44)
MAGNESIUM SERPL-MCNC: 2.1 MG/DL — SIGNIFICANT CHANGE UP (ref 1.6–2.6)
MAGNESIUM SERPL-MCNC: 2.1 MG/DL — SIGNIFICANT CHANGE UP (ref 1.6–2.6)
MCHC RBC-ENTMCNC: 27 PG — SIGNIFICANT CHANGE UP (ref 27–34)
MCHC RBC-ENTMCNC: 27 PG — SIGNIFICANT CHANGE UP (ref 27–34)
MCHC RBC-ENTMCNC: 31.5 GM/DL — LOW (ref 32–36)
MCHC RBC-ENTMCNC: 31.5 GM/DL — LOW (ref 32–36)
MCV RBC AUTO: 85.8 FL — SIGNIFICANT CHANGE UP (ref 80–100)
MCV RBC AUTO: 85.8 FL — SIGNIFICANT CHANGE UP (ref 80–100)
MONOCYTES # BLD AUTO: 0.48 K/UL — SIGNIFICANT CHANGE UP (ref 0–0.9)
MONOCYTES # BLD AUTO: 0.48 K/UL — SIGNIFICANT CHANGE UP (ref 0–0.9)
MONOCYTES NFR BLD AUTO: 7.2 % — SIGNIFICANT CHANGE UP (ref 2–14)
MONOCYTES NFR BLD AUTO: 7.2 % — SIGNIFICANT CHANGE UP (ref 2–14)
NEUTROPHILS # BLD AUTO: 4.81 K/UL — SIGNIFICANT CHANGE UP (ref 1.8–7.4)
NEUTROPHILS # BLD AUTO: 4.81 K/UL — SIGNIFICANT CHANGE UP (ref 1.8–7.4)
NEUTROPHILS NFR BLD AUTO: 72.5 % — SIGNIFICANT CHANGE UP (ref 43–77)
NEUTROPHILS NFR BLD AUTO: 72.5 % — SIGNIFICANT CHANGE UP (ref 43–77)
NRBC # BLD: 0 /100 WBCS — SIGNIFICANT CHANGE UP (ref 0–0)
NRBC # BLD: 0 /100 WBCS — SIGNIFICANT CHANGE UP (ref 0–0)
PHOSPHATE SERPL-MCNC: 2.7 MG/DL — SIGNIFICANT CHANGE UP (ref 2.5–4.5)
PHOSPHATE SERPL-MCNC: 2.7 MG/DL — SIGNIFICANT CHANGE UP (ref 2.5–4.5)
PLATELET # BLD AUTO: 310 K/UL — SIGNIFICANT CHANGE UP (ref 150–400)
PLATELET # BLD AUTO: 310 K/UL — SIGNIFICANT CHANGE UP (ref 150–400)
POTASSIUM SERPL-MCNC: 4.3 MMOL/L — SIGNIFICANT CHANGE UP (ref 3.5–5.3)
POTASSIUM SERPL-MCNC: 4.3 MMOL/L — SIGNIFICANT CHANGE UP (ref 3.5–5.3)
POTASSIUM SERPL-SCNC: 4.3 MMOL/L — SIGNIFICANT CHANGE UP (ref 3.5–5.3)
POTASSIUM SERPL-SCNC: 4.3 MMOL/L — SIGNIFICANT CHANGE UP (ref 3.5–5.3)
PROT SERPL-MCNC: 6.2 G/DL — SIGNIFICANT CHANGE UP (ref 6–8.3)
PROT SERPL-MCNC: 6.2 G/DL — SIGNIFICANT CHANGE UP (ref 6–8.3)
RBC # BLD: 3.59 M/UL — LOW (ref 3.8–5.2)
RBC # BLD: 3.59 M/UL — LOW (ref 3.8–5.2)
RBC # FLD: 17.2 % — HIGH (ref 10.3–14.5)
RBC # FLD: 17.2 % — HIGH (ref 10.3–14.5)
SARS-COV-2 RNA SPEC QL NAA+PROBE: SIGNIFICANT CHANGE UP
SARS-COV-2 RNA SPEC QL NAA+PROBE: SIGNIFICANT CHANGE UP
SODIUM SERPL-SCNC: 139 MMOL/L — SIGNIFICANT CHANGE UP (ref 135–145)
SODIUM SERPL-SCNC: 139 MMOL/L — SIGNIFICANT CHANGE UP (ref 135–145)
WBC # BLD: 6.63 K/UL — SIGNIFICANT CHANGE UP (ref 3.8–10.5)
WBC # BLD: 6.63 K/UL — SIGNIFICANT CHANGE UP (ref 3.8–10.5)
WBC # FLD AUTO: 6.63 K/UL — SIGNIFICANT CHANGE UP (ref 3.8–10.5)
WBC # FLD AUTO: 6.63 K/UL — SIGNIFICANT CHANGE UP (ref 3.8–10.5)

## 2024-01-15 PROCEDURE — 99232 SBSQ HOSP IP/OBS MODERATE 35: CPT

## 2024-01-15 RX ADMIN — Medication 56 MICROGRAM(S): at 22:28

## 2024-01-15 RX ADMIN — Medication 1 APPLICATION(S): at 22:31

## 2024-01-15 RX ADMIN — Medication 1 APPLICATION(S): at 14:54

## 2024-01-15 RX ADMIN — Medication 1 MILLIGRAM(S): at 11:35

## 2024-01-15 RX ADMIN — Medication 100 MILLIGRAM(S): at 11:36

## 2024-01-15 RX ADMIN — SENNA PLUS 2 TABLET(S): 8.6 TABLET ORAL at 22:25

## 2024-01-15 RX ADMIN — CHLORHEXIDINE GLUCONATE 1 APPLICATION(S): 213 SOLUTION TOPICAL at 11:36

## 2024-01-15 RX ADMIN — POLYETHYLENE GLYCOL 3350 17 GRAM(S): 17 POWDER, FOR SOLUTION ORAL at 11:34

## 2024-01-15 RX ADMIN — Medication 1 APPLICATION(S): at 06:06

## 2024-01-15 RX ADMIN — Medication 500 MILLIGRAM(S): at 11:36

## 2024-01-15 RX ADMIN — Medication 1 TABLET(S): at 11:36

## 2024-01-15 RX ADMIN — ENOXAPARIN SODIUM 40 MILLIGRAM(S): 100 INJECTION SUBCUTANEOUS at 14:53

## 2024-01-15 RX ADMIN — PANTOPRAZOLE SODIUM 40 MILLIGRAM(S): 20 TABLET, DELAYED RELEASE ORAL at 11:35

## 2024-01-15 RX ADMIN — SERTRALINE 25 MILLIGRAM(S): 25 TABLET, FILM COATED ORAL at 11:35

## 2024-01-15 NOTE — PROGRESS NOTE ADULT - ASSESSMENT
68F with PMH significant for breast cancer s/p L mastectomy, TBI (s/p a fall ~2 years ago) c/b aphasia, meningioma, hypothyroidism (hx Grave's dz s/p radioactive iodine), CVA, and dysphagia who presents to [facility] ED on 12/20/2023 with no PO intake and increased lethargy in the last 2-3 days, admitted for hypernatremia 166 and UTI. Hospital course complicated by AHRF likely 2/2 to aspiration pneumonitis, pending PEG evaluation   68F with PMH significant for breast cancer s/p L mastectomy, TBI (s/p a fall ~2 years ago) c/b aphasia, meningioma, hypothyroidism (hx Grave's dz s/p radioactive iodine), CVA, and dysphagia who presents to [facility] ED on 12/20/2023 with no PO intake and increased lethargy in the last 2-3 days, admitted for hypernatremia 166 and UTI. Hospital course complicated by AHRF likely 2/2 to aspiration pneumonitis, pending PEG evaluation   68F with PMH significant for breast cancer s/p L mastectomy, TBI (s/p a fall ~2 years ago) c/b aphasia, meningioma, hypothyroidism (hx Grave's dz s/p radioactive iodine), CVA, and dysphagia who presents to [facility] ED on 12/20/2023 with no PO intake and increased lethargy in the last 2-3 days, admitted for hypernatremia 166 and UTI. Hospital course complicated by AHRF likely 2/2 to aspiration pneumonitis, pending PEG evaluation

## 2024-01-15 NOTE — PROGRESS NOTE ADULT - PROBLEM SELECTOR PLAN 1
Hx of dysphagia since 2-3 months ago per son. Notable for severe b/l ext contracture and cachexia. Low PO intake at baseline, no PO intake in the last 2-3 days. NGT placed 12/22, subsequent removals and replacements with most recent placed on 1/7.  SLP cleared patient for Puree/moderately thick liquids via TSP amount only only when the patient is awake and alert; however pt unlikely to get adequate calories.  understands risks of aspiration but would like to attempt feeding.  GI consulted for PEG, ongoing GOC on final decision from family.   would like to explore any reversible causes prior to PEG placement.  However discussed w/  worsening PO intake and dysphagia likely 2/2 to progression of disease, unlikely reversible.   would like to try boosting her caloric intake with tube feed supplementation to see if that improves her ability to tolerate food.   would like to try PO feeding with TSP is patient tolerates, understands and accepts risk of aspiration.  After patient had not had much recovery in functional status with feeding, palliative care discussion occurred on 1/10 with  requesting PEG placement after risks and benefits were discussed.   would ultimately desire that patient regains some functional capacity, and would consider taking patient home with health aid or rehabilitation as well.  S/p PEG 1/11, cleared by GI for use, will now hold oral feeding pending further discussions with  about pleasure feeds  - Continue tube feeds   - Glucerna 1.2 @ starting at 10ml/hr and advance by [id number] or as tolerated to goal rate of 50 mL/hr x 24hrs  - Considering transition to bolus feeds once tolerating continuous feeds at goal rate, bolus feeds of Glucerna 1.2 @ 237ml (1 can) 5x/daily  - Will hold on PO feeding with TSP  - Palliative care consulted  - Appreciate GI PEG placement Hx of dysphagia since 2-3 months ago per son. Notable for severe b/l ext contracture and cachexia. Low PO intake at baseline, no PO intake in the last 2-3 days. NGT placed 12/22, subsequent removals and replacements with most recent placed on 1/7.  SLP cleared patient for Puree/moderately thick liquids via TSP amount only only when the patient is awake and alert; however pt unlikely to get adequate calories.  understands risks of aspiration but would like to attempt feeding.  GI consulted for PEG, ongoing GOC on final decision from family.   would like to explore any reversible causes prior to PEG placement.  However discussed w/  worsening PO intake and dysphagia likely 2/2 to progression of disease, unlikely reversible.   would like to try boosting her caloric intake with tube feed supplementation to see if that improves her ability to tolerate food.   would like to try PO feeding with TSP is patient tolerates, understands and accepts risk of aspiration.  After patient had not had much recovery in functional status with feeding, palliative care discussion occurred on 1/10 with  requesting PEG placement after risks and benefits were discussed.   would ultimately desire that patient regains some functional capacity, and would consider taking patient home with health aid or rehabilitation as well.  S/p PEG 1/11, cleared by GI for use, will now hold oral feeding pending further discussions with  about pleasure feeds  - Continue tube feeds   - Glucerna 1.2 @ starting at 10ml/hr and advance by [id number] or as tolerated to goal rate of 50 mL/hr x 24hrs  - Considering transition to bolus feeds once tolerating continuous feeds at goal rate, bolus feeds of Glucerna 1.2 @ 237ml (1 can) 5x/daily  - Will hold on PO feeding with TSP  - Palliative care consulted  - Appreciate GI PEG placement Hx of dysphagia since 2-3 months ago per son. Notable for severe b/l ext contracture and cachexia. Low PO intake at baseline, no PO intake in the last 2-3 days. NGT placed 12/22, subsequent removals and replacements with most recent placed on 1/7.  SLP cleared patient for Puree/moderately thick liquids via TSP amount only only when the patient is awake and alert; however pt unlikely to get adequate calories.  understands risks of aspiration but would like to attempt feeding.  GI consulted for PEG, ongoing GOC on final decision from family.   would like to explore any reversible causes prior to PEG placement.  However discussed w/  worsening PO intake and dysphagia likely 2/2 to progression of disease, unlikely reversible.   would like to try boosting her caloric intake with tube feed supplementation to see if that improves her ability to tolerate food.   would like to try PO feeding with TSP is patient tolerates, understands and accepts risk of aspiration.  After patient had not had much recovery in functional status with feeding, palliative care discussion occurred on 1/10 with  requesting PEG placement after risks and benefits were discussed.   would ultimately desire that patient regains some functional capacity, and would consider taking patient home with health aid or rehabilitation as well.  S/p PEG 1/11, cleared by GI for use, will now hold oral feeding pending further discussions with  about pleasure feeds  - Continue tube feeds   - Glucerna 1.2 @ starting at 10ml/hr and advance by [id number] or as tolerated to goal rate of 50 mL/hr x 24hrs  - Considering transition to bolus feeds once tolerating continuous feeds at goal rate, bolus feeds of Glucerna 1.2 @ 237ml (1 can) 5x/daily  - Will hold on PO feeding with TSP  - Palliative care consulted  - Appreciate GI PEG placement

## 2024-01-15 NOTE — PROGRESS NOTE ADULT - SUBJECTIVE AND OBJECTIVE BOX
***************************************************************  Bassem Hernandez, PG1  Internal Medicine   Pager:  TEAMS  ***************************************************************    JONAHTAN YOUNGER  68y  MRN: [id number]    Patient is a 68y old  Female who presents with a chief complaint of No PO intake, lethargy (15 Angel 2024 06:46)      Interval/Overnight Events: no events ON.     Subjective: Pt seen and examined at bedside.  Awakens to voice, squeezes fingers on command.     MEDICATIONS  (STANDING):  ascorbic acid 500 milliGRAM(s) Oral daily  chlorhexidine 2% Cloths 1 Application(s) Topical daily  Dakins Solution - 1/4 Strength 1 Application(s) Topical every 8 hours  enoxaparin Injectable 40 milliGRAM(s) SubCutaneous every 24 hours  folic acid 1 milliGRAM(s) Oral daily  lactated ringers. 500 milliLiter(s) (75 mL/Hr) IV Continuous <Continuous>  levothyroxine Injectable 56 MICROGram(s) IV Push at bedtime  multivitamin 1 Tablet(s) Oral daily  pantoprazole   Suspension 40 milliGRAM(s) Oral daily  polyethylene glycol 3350 17 Gram(s) Oral daily  povidone iodine 10% Solution 1 Application(s) Topical daily  senna 2 Tablet(s) Oral at bedtime  sertraline 25 milliGRAM(s) Oral daily  sodium chloride 0.9%. 500 milliLiter(s) (30 mL/Hr) IV Continuous <Continuous>  thiamine 100 milliGRAM(s) Oral daily    MEDICATIONS  (PRN):      Objective:    Vitals: Vital Signs Last 24 Hrs  T(C): 36.5 (01-15-24 @ 05:08), Max: 36.5 (01-15-24 @ 05:08)  T(F): 97.7 (01-15-24 @ 05:08), Max: 97.7 (01-15-24 @ 05:08)  HR: 77 (01-15-24 @ 05:08) (75 - 79)  BP: 117/73 (01-15-24 @ 05:08) (97/61 - 117/73)  BP(mean): --  RR: 20 (01-15-24 @ 05:08) (18 - 20)  SpO2: 98% (01-15-24 @ 05:08) (95% - 100%)                I&O's Summary    14 Jan 2024 07:01  -  15 Angel 2024 07:00  --------------------------------------------------------  IN: 1550 mL / OUT: 1300 mL / NET: 250 mL        PHYSICAL EXAM:  GENERAL: NAD, cachetic, chronically ill appearing   HEAD:  Atraumatic, Normocephalic  EYES: PERRL, conjunctiva and sclera clear  ENMT: dry mucous membranes   NECK: Supple  CHEST/LUNG: Clear to auscultation bilaterally; No rales, rhonchi, wheezing, or rubs but difficult to assess due to contracted habitus  HEART: Regular rate and rhythm; No murmurs, rubs, or gallops  ABDOMEN: Soft, Nontender, Nondistended; Bowel sounds present  EXTREMITIES:  no clubbing, cyanosis, or edema   SKIN: No rashes or lesions  NERVOUS SYSTEM:  Alert, opens eyes to voice, contracted extremities, squeezes fingers on command  PSYCH: Unable to assess    LABS:                        9.7    6.63  )-----------( 310      ( 15 Angel 2024 07:12 )             30.8                         9.8    7.40  )-----------( 328      ( 14 Jan 2024 07:04 )             31.7     01-15    139  |  102  |  10  ----------------------------<  120<H>  4.3   |  27  |  0.31<L>  01-14    139  |  102  |  12  ----------------------------<  123<H>  4.2   |  27  |  0.33<L>    Ca    8.6      15 Angel 2024 07:12  Ca    8.4      14 Jan 2024 07:06  Phos  2.7     01-15  Mg     2.1     01-15    TPro  6.2  /  Alb  2.9<L>  /  TBili  0.3  /  DBili  x   /  AST  9<L>  /  ALT  6<L>  /  AlkPhos  73  01-15  TPro  6.3  /  Alb  2.9<L>  /  TBili  0.4  /  DBili  x   /  AST  11  /  ALT  9<L>  /  AlkPhos  70  01-14    CAPILLARY BLOOD GLUCOSE      POCT Blood Glucose.: 121 mg/dL (15 Angel 2024 06:20)  POCT Blood Glucose.: 115 mg/dL (14 Jan 2024 23:22)  POCT Blood Glucose.: 249 mg/dL (14 Jan 2024 16:12)        Urinalysis Basic - ( 15 Angel 2024 07:12 )    Color: x / Appearance: x / SG: x / pH: x  Gluc: 120 mg/dL / Ketone: x  / Bili: x / Urobili: x   Blood: x / Protein: x / Nitrite: x   Leuk Esterase: x / RBC: x / WBC x   Sq Epi: x / Non Sq Epi: x / Bacteria: x          RADIOLOGY & ADDITIONAL TESTS:    Imaging Personally Reviewed:  [x ] YES  [ ] NO    Consultants involved in case:   Consultant(s) Notes Reviewed:  [ x] YES  [ ] NO:   Care Discussed with Consultants/Other Providers [x ] YES  [ ] NO         ***************************************************************  Bassem Hernandez, PG1  Internal Medicine   Pager:  TEAMS  ***************************************************************    JONATHAN YOUNGER  68y  MRN: [id number]    Patient is a 68y old  Female who presents with a chief complaint of No PO intake, lethargy (15 Angel 2024 06:46)      Interval/Overnight Events: no events ON.     Subjective: Pt seen and examined at bedside.  Awakens to voice, squeezes fingers on command.     MEDICATIONS  (STANDING):  ascorbic acid 500 milliGRAM(s) Oral daily  chlorhexidine 2% Cloths 1 Application(s) Topical daily  Dakins Solution - 1/4 Strength 1 Application(s) Topical every 8 hours  enoxaparin Injectable 40 milliGRAM(s) SubCutaneous every 24 hours  folic acid 1 milliGRAM(s) Oral daily  lactated ringers. 500 milliLiter(s) (75 mL/Hr) IV Continuous <Continuous>  levothyroxine Injectable 56 MICROGram(s) IV Push at bedtime  multivitamin 1 Tablet(s) Oral daily  pantoprazole   Suspension 40 milliGRAM(s) Oral daily  polyethylene glycol 3350 17 Gram(s) Oral daily  povidone iodine 10% Solution 1 Application(s) Topical daily  senna 2 Tablet(s) Oral at bedtime  sertraline 25 milliGRAM(s) Oral daily  sodium chloride 0.9%. 500 milliLiter(s) (30 mL/Hr) IV Continuous <Continuous>  thiamine 100 milliGRAM(s) Oral daily    MEDICATIONS  (PRN):      Objective:    Vitals: Vital Signs Last 24 Hrs  T(C): 36.5 (01-15-24 @ 05:08), Max: 36.5 (01-15-24 @ 05:08)  T(F): 97.7 (01-15-24 @ 05:08), Max: 97.7 (01-15-24 @ 05:08)  HR: 77 (01-15-24 @ 05:08) (75 - 79)  BP: 117/73 (01-15-24 @ 05:08) (97/61 - 117/73)  BP(mean): --  RR: 20 (01-15-24 @ 05:08) (18 - 20)  SpO2: 98% (01-15-24 @ 05:08) (95% - 100%)                I&O's Summary    14 Jan 2024 07:01  -  15 Angel 2024 07:00  --------------------------------------------------------  IN: 1550 mL / OUT: 1300 mL / NET: 250 mL        PHYSICAL EXAM:  GENERAL: NAD, cachetic, chronically ill appearing   HEAD:  Atraumatic, Normocephalic  EYES: PERRL, conjunctiva and sclera clear  ENMT: dry mucous membranes   NECK: Supple  CHEST/LUNG: Clear to auscultation bilaterally; No rales, rhonchi, wheezing, or rubs but difficult to assess due to contracted habitus  HEART: Regular rate and rhythm; No murmurs, rubs, or gallops  ABDOMEN: Soft, Nontender, Nondistended; Bowel sounds present  EXTREMITIES:  no clubbing, cyanosis, or edema   SKIN: No rashes or lesions  NERVOUS SYSTEM:  Alert, opens eyes to voice, contracted extremities, squeezes fingers on command  PSYCH: Unable to assess    LABS:                        9.7    6.63  )-----------( 310      ( 15 Angel 2024 07:12 )             30.8                         9.8    7.40  )-----------( 328      ( 14 Jan 2024 07:04 )             31.7     01-15    139  |  102  |  10  ----------------------------<  120<H>  4.3   |  27  |  0.31<L>  01-14    139  |  102  |  12  ----------------------------<  123<H>  4.2   |  27  |  0.33<L>    Ca    8.6      15 Angel 2024 07:12  Ca    8.4      14 Jan 2024 07:06  Phos  2.7     01-15  Mg     2.1     01-15    TPro  6.2  /  Alb  2.9<L>  /  TBili  0.3  /  DBili  x   /  AST  9<L>  /  ALT  6<L>  /  AlkPhos  73  01-15  TPro  6.3  /  Alb  2.9<L>  /  TBili  0.4  /  DBili  x   /  AST  11  /  ALT  9<L>  /  AlkPhos  70  01-14    CAPILLARY BLOOD GLUCOSE      POCT Blood Glucose.: 121 mg/dL (15 Angel 2024 06:20)  POCT Blood Glucose.: 115 mg/dL (14 Jan 2024 23:22)  POCT Blood Glucose.: 249 mg/dL (14 Jan 2024 16:12)        Urinalysis Basic - ( 15 Angel 2024 07:12 )    Color: x / Appearance: x / SG: x / pH: x  Gluc: 120 mg/dL / Ketone: x  / Bili: x / Urobili: x   Blood: x / Protein: x / Nitrite: x   Leuk Esterase: x / RBC: x / WBC x   Sq Epi: x / Non Sq Epi: x / Bacteria: x          RADIOLOGY & ADDITIONAL TESTS:    Imaging Personally Reviewed:  [x ] YES  [ ] NO    Consultants involved in case:   Consultant(s) Notes Reviewed:  [ x] YES  [ ] NO:   Care Discussed with Consultants/Other Providers [x ] YES  [ ] NO         ***************************************************************  Bassem Hernandez, PG1  Internal Medicine   Pager:  TEAMS  ***************************************************************    JONATHAN YOUNGER  68y  MRN: [id number]    Patient is a 68y old  Female who presents with a chief complaint of No PO intake, lethargy (15 Angel 2024 06:46)      Interval/Overnight Events: no events ON.     Subjective: Pt seen and examined at bedside.  Awakens to voice, squeezes fingers on command.     MEDICATIONS  (STANDING):  ascorbic acid 500 milliGRAM(s) Oral daily  chlorhexidine 2% Cloths 1 Application(s) Topical daily  Dakins Solution - 1/4 Strength 1 Application(s) Topical every 8 hours  enoxaparin Injectable 40 milliGRAM(s) SubCutaneous every 24 hours  folic acid 1 milliGRAM(s) Oral daily  lactated ringers. 500 milliLiter(s) (75 mL/Hr) IV Continuous <Continuous>  levothyroxine Injectable 56 MICROGram(s) IV Push at bedtime  multivitamin 1 Tablet(s) Oral daily  pantoprazole   Suspension 40 milliGRAM(s) Oral daily  polyethylene glycol 3350 17 Gram(s) Oral daily  povidone iodine 10% Solution 1 Application(s) Topical daily  senna 2 Tablet(s) Oral at bedtime  sertraline 25 milliGRAM(s) Oral daily  sodium chloride 0.9%. 500 milliLiter(s) (30 mL/Hr) IV Continuous <Continuous>  thiamine 100 milliGRAM(s) Oral daily    MEDICATIONS  (PRN):      Objective:    Vitals: Vital Signs Last 24 Hrs  T(C): 36.5 (01-15-24 @ 05:08), Max: 36.5 (01-15-24 @ 05:08)  T(F): 97.7 (01-15-24 @ 05:08), Max: 97.7 (01-15-24 @ 05:08)  HR: 77 (01-15-24 @ 05:08) (75 - 79)  BP: 117/73 (01-15-24 @ 05:08) (97/61 - 117/73)  BP(mean): --  RR: 20 (01-15-24 @ 05:08) (18 - 20)  SpO2: 98% (01-15-24 @ 05:08) (95% - 100%)                I&O's Summary    14 Jan 2024 07:01  -  15 Angel 2024 07:00  --------------------------------------------------------  IN: 1550 mL / OUT: 1300 mL / NET: 250 mL        PHYSICAL EXAM:  GENERAL: NAD, cachetic, chronically ill appearing   HEAD:  Atraumatic, Normocephalic  EYES: PERRL, conjunctiva and sclera clear  ENMT: dry mucous membranes   NECK: Supple  CHEST/LUNG: Clear to auscultation bilaterally; No rales, rhonchi, wheezing, or rubs but difficult to assess due to contracted habitus  HEART: Regular rate and rhythm; No murmurs, rubs, or gallops  ABDOMEN: Soft, Nontender, Nondistended; Bowel sounds present  EXTREMITIES:  no clubbing, cyanosis, or edema   SKIN: No rashes or lesions  NERVOUS SYSTEM:  Alert, opens eyes to voice, contracted extremities, squeezes fingers on command  PSYCH: Unable to assess    LABS:                        9.7    6.63  )-----------( 310      ( 15 Angel 2024 07:12 )             30.8                         9.8    7.40  )-----------( 328      ( 14 Jan 2024 07:04 )             31.7     01-15    139  |  102  |  10  ----------------------------<  120<H>  4.3   |  27  |  0.31<L>  01-14    139  |  102  |  12  ----------------------------<  123<H>  4.2   |  27  |  0.33<L>    Ca    8.6      15 Angel 2024 07:12  Ca    8.4      14 Jan 2024 07:06  Phos  2.7     01-15  Mg     2.1     01-15    TPro  6.2  /  Alb  2.9<L>  /  TBili  0.3  /  DBili  x   /  AST  9<L>  /  ALT  6<L>  /  AlkPhos  73  01-15  TPro  6.3  /  Alb  2.9<L>  /  TBili  0.4  /  DBili  x   /  AST  11  /  ALT  9<L>  /  AlkPhos  70  01-14    CAPILLARY BLOOD GLUCOSE      POCT Blood Glucose.: 121 mg/dL (15 Angel 2024 06:20)  POCT Blood Glucose.: 115 mg/dL (14 Jan 2024 23:22)  POCT Blood Glucose.: 249 mg/dL (14 Jan 2024 16:12)        Urinalysis Basic - ( 15 Angel 2024 07:12 )    Color: x / Appearance: x / SG: x / pH: x  Gluc: 120 mg/dL / Ketone: x  / Bili: x / Urobili: x   Blood: x / Protein: x / Nitrite: x   Leuk Esterase: x / RBC: x / WBC x   Sq Epi: x / Non Sq Epi: x / Bacteria: x          RADIOLOGY & ADDITIONAL TESTS:    Imaging Personally Reviewed:  [x ] YES  [ ] NO    Consultants involved in case:   Consultant(s) Notes Reviewed:  [ x] YES  [ ] NO:   Care Discussed with Consultants/Other Providers [x ] YES  [ ] NO

## 2024-01-15 NOTE — PROGRESS NOTE ADULT - ATTENDING COMMENTS
68F with PMH significant for breast cancer s/p L mastectomy, TBI (s/p a fall ~2 years ago) c/b aphasia, meningioma, hypothyroidism (hx Grave's dz s/p radioactive iodine), CVA, and dysphagia who presents to [facility] ED on 12/20/2023 with no PO intake and increased lethargy in the last 2-3 days, admitted for hypernatremia 166 and UTI. Hospital course complicated by AHRF likely 2/2 to aspiration pneumonitis, pending PEG evaluation.    Plan:   - C/w with PEG tube, Glucerna   - Pending DC to skilled nursing facility     Rest of management as per resident.     VTE ppx: lovenox   Diet: Glucerna, peg tube  Dispo: Skilled Nursing facility . 68F with PMH significant for breast cancer s/p L mastectomy, TBI (s/p a fall ~2 years ago) c/b aphasia, meningioma, hypothyroidism (hx Grave's dz s/p radioactive iodine), CVA, and dysphagia who presents to [facility] ED on 12/20/2023 with no PO intake and increased lethargy in the last 2-3 days, admitted for hypernatremia 166 and UTI. Hospital course complicated by AHRF likely 2/2 to aspiration pneumonitis, pending PEG evaluation.    Plan:   - C/w with PEG tube, Glucerna   - Pending DC to skilled nursing facility     Rest of management as per resident.     VTE ppx: lovenox   Diet: Glucerna, peg tube  Dispo: Skilled Nursing facility . 68F with PMH significant for breast cancer s/p L mastectomy, TBI (s/p a fall ~2 years ago) c/b aphasia, meningioma, hypothyroidism (hx Grave's dz s/p radioactive iodine), CVA, and dysphagia who presents to [facility] ED on 12/20/2023 with no PO intake and increased lethargy in the last 2-3 days, admitted for hypernatremia 166 and UTI. Hospital course complicated by AHRF likely 2/2 to aspiration pneumonitis, pending PEG evaluation.    Plan:   - C/w with PEG tube, Glucerna   - Pending DC to skilled nursing facility     Rest of management as per resident.     VTE ppx: lovenox   Diet: Glucerna, peg tube  Dispo: Skilled Nursing facility .

## 2024-01-16 LAB
GLUCOSE BLDC GLUCOMTR-MCNC: 120 MG/DL — HIGH (ref 70–99)
GLUCOSE BLDC GLUCOMTR-MCNC: 120 MG/DL — HIGH (ref 70–99)
GLUCOSE BLDC GLUCOMTR-MCNC: 124 MG/DL — HIGH (ref 70–99)
GLUCOSE BLDC GLUCOMTR-MCNC: 137 MG/DL — HIGH (ref 70–99)
GLUCOSE BLDC GLUCOMTR-MCNC: 152 MG/DL — HIGH (ref 70–99)

## 2024-01-16 PROCEDURE — 99232 SBSQ HOSP IP/OBS MODERATE 35: CPT | Mod: GC

## 2024-01-16 RX ORDER — LEVOTHYROXINE SODIUM 125 MCG
112 TABLET ORAL DAILY
Refills: 0 | Status: DISCONTINUED | OUTPATIENT
Start: 2024-01-16 | End: 2024-01-18

## 2024-01-16 RX ORDER — SODIUM CHLORIDE 9 MG/ML
500 INJECTION, SOLUTION INTRAVENOUS
Refills: 0 | Status: DISCONTINUED | OUTPATIENT
Start: 2024-01-16 | End: 2024-01-17

## 2024-01-16 RX ADMIN — Medication 100 MILLIGRAM(S): at 13:03

## 2024-01-16 RX ADMIN — SERTRALINE 25 MILLIGRAM(S): 25 TABLET, FILM COATED ORAL at 13:03

## 2024-01-16 RX ADMIN — Medication 1 APPLICATION(S): at 13:04

## 2024-01-16 RX ADMIN — Medication 1 MILLIGRAM(S): at 13:03

## 2024-01-16 RX ADMIN — Medication 1 TABLET(S): at 13:03

## 2024-01-16 RX ADMIN — SENNA PLUS 2 TABLET(S): 8.6 TABLET ORAL at 21:57

## 2024-01-16 RX ADMIN — SODIUM CHLORIDE 50 MILLILITER(S): 9 INJECTION, SOLUTION INTRAVENOUS at 18:30

## 2024-01-16 RX ADMIN — Medication 1 APPLICATION(S): at 06:08

## 2024-01-16 RX ADMIN — CHLORHEXIDINE GLUCONATE 1 APPLICATION(S): 213 SOLUTION TOPICAL at 13:04

## 2024-01-16 RX ADMIN — PANTOPRAZOLE SODIUM 40 MILLIGRAM(S): 20 TABLET, DELAYED RELEASE ORAL at 13:03

## 2024-01-16 RX ADMIN — Medication 1 APPLICATION(S): at 21:58

## 2024-01-16 RX ADMIN — Medication 500 MILLIGRAM(S): at 13:03

## 2024-01-16 RX ADMIN — POLYETHYLENE GLYCOL 3350 17 GRAM(S): 17 POWDER, FOR SOLUTION ORAL at 22:04

## 2024-01-16 RX ADMIN — ENOXAPARIN SODIUM 40 MILLIGRAM(S): 100 INJECTION SUBCUTANEOUS at 13:05

## 2024-01-16 NOTE — PROGRESS NOTE ADULT - PROBLEM SELECTOR PLAN 2
Patient is anemic chronically, with recent Hgb drop.  CT C/A/P with no signs of active bleeding or hematoma.  Hgb stable P/w progressively worsening lethargy/encephalopathy i/s/o no PO intake for 2-3 days; at baseline, answers simple questions (Y/N, name) per son. Most likely ddx include metabolic (hypernatremia) vs infection (c/f UTI) vs severe malnutrition vs mixed.  S/p brain MRI 1/4/2024: No recent ischemia, intracranial hemorrhages or enhancing mass lesions.  Currently AOx0, non-verbal, opens eyes to voice, occasionally can follow commands.  - Continue tube feeds  - Continue sertraline

## 2024-01-16 NOTE — PROGRESS NOTE ADULT - PROBLEM SELECTOR PLAN 5
Hx of breast cancer, s/p L mastectomy  - Holding home Tamoxifen (NPO) Hx of Grave's disease s/p radioactive iodine  - resumed home levothyroxine in IV formulation

## 2024-01-16 NOTE — PROGRESS NOTE ADULT - PROBLEM SELECTOR PLAN 4
Multiple pressure ulcers noted on physical exam with varying stages (I-III), some with peeling and discoloration, possible tissue loss  - Son reports pt using Santyl topical at home  - wound care consulted, appreciate recs Hx of breast cancer, s/p L mastectomy  - Holding home Tamoxifen (NPO)

## 2024-01-16 NOTE — PROGRESS NOTE ADULT - PROBLEM SELECTOR PLAN 1
Hx of dysphagia since 2-3 months ago per son. Notable for severe b/l ext contracture and cachexia. Low PO intake at baseline, no PO intake in the last 2-3 days. NGT placed 12/22, subsequent removals and replacements with most recent placed on 1/7.  SLP cleared patient for Puree/moderately thick liquids via TSP amount only only when the patient is awake and alert; however pt unlikely to get adequate calories.  understands risks of aspiration but would like to attempt feeding.  GI consulted for PEG, ongoing GOC on final decision from family.   would like to explore any reversible causes prior to PEG placement.  However discussed w/  worsening PO intake and dysphagia likely 2/2 to progression of disease, unlikely reversible.   would like to try boosting her caloric intake with tube feed supplementation to see if that improves her ability to tolerate food.   would like to try PO feeding with TSP is patient tolerates, understands and accepts risk of aspiration.  After patient had not had much recovery in functional status with feeding, palliative care discussion occurred on 1/10 with  requesting PEG placement after risks and benefits were discussed.   would ultimately desire that patient regains some functional capacity, and would consider taking patient home with health aid or rehabilitation as well.  S/p PEG 1/11, cleared by GI for use, will now hold oral feeding pending further discussions with  about pleasure feeds  - Continue tube feeds   - Glucerna 1.2 @ starting at 10ml/hr and advance by [id number] or as tolerated to goal rate of 50 mL/hr x 24hrs  - Considering transition to bolus feeds once tolerating continuous feeds at goal rate, bolus feeds of Glucerna 1.2 @ 237ml (1 can) 5x/daily  - Will hold on PO feeding with TSP  - Palliative care consulted  - Appreciate GI PEG placement Hx of dysphagia since 2-3 months ago per son. Notable for severe b/l ext contracture and cachexia. Low PO intake at baseline, no PO intake in the last 2-3 days. NGT placed 12/22, subsequent removals and replacements with most recent placed on 1/7.  SLP cleared patient for Puree/moderately thick liquids via TSP amount only only when the patient is awake and alert; however pt unlikely to get adequate calories.  understands risks of aspiration but would like to attempt feeding.  GI consulted for PEG, ongoing GOC on final decision from family.   would like to explore any reversible causes prior to PEG placement.  However discussed w/  worsening PO intake and dysphagia likely 2/2 to progression of disease, unlikely reversible.   would like to try boosting her caloric intake with tube feed supplementation to see if that improves her ability to tolerate food.   would like to try PO feeding with TSP is patient tolerates, understands and accepts risk of aspiration.  After patient had not had much recovery in functional status with feeding, palliative care discussion occurred on 1/10 with  requesting PEG placement after risks and benefits were discussed.   would ultimately desire that patient regains some functional capacity, and would consider taking patient home with health aid or rehabilitation as well.  S/p PEG 1/11, cleared by GI for use, will now hold oral feeding pending further discussions with  about pleasure feeds  - Continue tube feeds   - Glucerna 1.2 @ starting at 10ml/hr and advance by [id number] or as tolerated to goal rate of 50 mL/hr x 24hrs  - Considering transition to bolus feeds once tolerating continuous feeds at goal rate, bolus feeds of Glucerna 1.2 @ 237ml (1 can) 5x/daily  - Will hold on PO feeding with TSP  - Palliative care consulted  - Appreciate GI PEG placement Hx of dysphagia since 2-3 months ago per son. Notable for severe b/l ext contracture and cachexia. Low PO intake at baseline, no PO intake in the last 2-3 days. NGT placed 12/22, subsequent removals and replacements with most recent placed on 1/7.  SLP cleared patient for Puree/moderately thick liquids via TSP amount only only when the patient is awake and alert; however pt unlikely to get adequate calories.  understands risks of aspiration but would like to attempt feeding.  GI consulted for PEG, ongoing GOC on final decision from family.   would like to explore any reversible causes prior to PEG placement.  However discussed w/  worsening PO intake and dysphagia likely 2/2 to progression of disease, unlikely reversible.   would like to try boosting her caloric intake with tube feed supplementation to see if that improves her ability to tolerate food.   would like to try PO feeding with TSP is patient tolerates, understands and accepts risk of aspiration.  After patient had not had much recovery in functional status with feeding, palliative care discussion occurred on 1/10 with  requesting PEG placement after risks and benefits were discussed.   would ultimately desire that patient regains some functional capacity, and would consider taking patient home with health aid or rehabilitation as well.  S/p PEG 1/11, cleared by GI for use, will now hold oral feeding pending further discussions with  about pleasure feeds  - Continue tube feeds   - Glucerna 1.2 @ starting at 10ml/hr and advance by [id number] or as tolerated to goal rate of 50 mL/hr x 24hrs  - Considering transition to bolus feeds once tolerating continuous feeds at goal rate, bolus feeds of Glucerna 1.2 @ 237ml (1 can) 5x/daily, depending on placement requirements  - Will hold on PO feeding with TSP  - Palliative care consulted  - Appreciate GI PEG placement  - Rigorous oral hygeine Hx of dysphagia since 2-3 months ago per son. Notable for severe b/l ext contracture and cachexia. Low PO intake at baseline, no PO intake in the last 2-3 days. NGT placed 12/22, subsequent removals and replacements with most recent placed on 1/7.  SLP cleared patient for Puree/moderately thick liquids via TSP amount only only when the patient is awake and alert; however pt unlikely to get adequate calories.  understands risks of aspiration but would like to attempt feeding.  GI consulted for PEG, ongoing GOC on final decision from family.   would like to explore any reversible causes prior to PEG placement.  However discussed w/  worsening PO intake and dysphagia likely 2/2 to progression of disease, unlikely reversible.   would like to try boosting her caloric intake with tube feed supplementation to see if that improves her ability to tolerate food.   would like to try PO feeding with TSP is patient tolerates, understands and accepts risk of aspiration.  After patient had not had much recovery in functional status with feeding, palliative care discussion occurred on 1/10 with  requesting PEG placement after risks and benefits were discussed.   would ultimately desire that patient regains some functional capacity, and would consider taking patient home with health aid or rehabilitation as well.  S/p PEG 1/11, cleared by GI for use, will now hold oral feeding pending further discussions with  about pleasure feeds  - Continue tube feeds   - Glucerna 1.2 @ starting at 10ml/hr and advance by [id number] or as tolerated to goal rate of 50 mL/hr x 24hrs  - Considering transition to bolus feeds once tolerating continuous feeds at goal rate, bolus feeds of Glucerna 1.2 @ 237ml (1 can) 5x/daily, depending on placement requirements  - Will hold on PO feeding with TSP  - Palliative care consulted  - Appreciate GI PEG placement  - Rigorous oral hygeine

## 2024-01-16 NOTE — PROGRESS NOTE ADULT - PROBLEM SELECTOR PLAN 3
P/w progressively worsening lethargy/encephalopathy i/s/o no PO intake for 2-3 days; at baseline, answers simple questions (Y/N, name) per son. Most likely ddx include metabolic (hypernatremia) vs infection (c/f UTI) vs severe malnutrition vs mixed.  S/p brain MRI 1/4/2024: No recent ischemia, intracranial hemorrhages or enhancing mass lesions.  Currently AOx0, non-verbal, opens eyes to voice, occasionally can follow commands.  - Continue tube feeds  - Continue sertraline Multiple pressure ulcers noted on physical exam with varying stages (I-III), some with peeling and discoloration, possible tissue loss  - Son reports pt using Santyl topical at home  - wound care consulted, appreciate recs

## 2024-01-16 NOTE — PROGRESS NOTE ADULT - TIME BILLING
Symptom assessment and management, supportive counseling, coordination of care
chart reviewing, history taking, physical exam, assessment and documentation, including speaking to specialist/SW/CM regarding the management.
- Ordering, reviewing, and interpreting labs, testing, and imaging.  - Independently obtaining a review of systems and performing a physical exam  - Reviewing consultant documentation/recommendations in addition to discussing plan of care with consultants.  - Counselling and educating patient and family regarding interpretation of aforementioned items and plan of care.
chart reviewing, history taking, physical exam, assessment and documentation, including speaking to specialist/SW/CM regarding the management.
Personally evaluating patient , review and order labs, discussion with the Palliative care team who had meeting with HCP and implementing care
- Ordering, reviewing, and interpreting labs, testing, and imaging.  - Independently obtaining a review of systems and performing a physical exam  - Reviewing consultant documentation/recommendations in addition to discussing plan of care with consultants.  - Counselling and educating patient and family regarding interpretation of aforementioned items and plan of care.
Personally evaluating patient , review and order labs, discussion with the Palliative care team who had meeting with HCP and implementing care
- Ordering, reviewing, and interpreting labs, testing, and imaging.  - Independently obtaining a review of systems and performing a physical exam  - Reviewing consultant documentation/recommendations in addition to discussing plan of care with consultants.  - Counselling and educating patient and family regarding interpretation of aforementioned items and plan of care.
chart reviewing, history taking, physical exam, assessment and documentation, including speaking to specialist/SW/CM regarding the management.
Review weekend record , review labs , implementing care
The necessity of the time spent during the encounter on this date of service was due to:   - Ordering, reviewing, and interpreting labs, testing, and imaging  - Independently obtaining a review of systems and performing a physical exam  - Reviewing prior hospitalization and where necessary, outpatient records  - Reviewing consultant recommendations/communicating with consultants  - Counselling and educating patient and family regarding interpretation of aforementioned items and plan of care
Personally evaluating patient , review and order labs, discussion with the Palliative care team who had meeting with HCP and implementing care

## 2024-01-16 NOTE — CHART NOTE - NSCHARTNOTEFT_GEN_A_CORE
Chart reviewed. Pt last seen for FEES on 1/10. Recommendations at that time include NPO w/ alternative means of nutrition/hydration/medication given high risk of aspiration. Pt is s/p PEG placement 1/11 and now tolerating tube feeds. Pt has no further ST needs, this service will sign off at this time. Re-consult as needed. SLP d/w MD Bassem Hernandez.      Antonietta Estevez MA, CCC-SLP  ext [phone] or TEAMS asn needed Chart reviewed. Pt last seen for FEES on 1/10. Recommendations at that time include NPO w/ alternative means of nutrition/hydration/medication given high risk of aspiration. Pt is s/p PEG placement 1/11 and now tolerating tube feeds. Pt has no further ST needs, this service will sign off at this time. Re-consult as needed. SLP d/w MD Bassem Hernandez.      Antonietta Estevez MA, CCC-SLP  ext [phone] or TEAMS asn needed

## 2024-01-16 NOTE — PROGRESS NOTE ADULT - ASSESSMENT
68F with PMH significant for breast cancer s/p L mastectomy, TBI (s/p a fall ~2 years ago) c/b aphasia, meningioma, hypothyroidism (hx Grave's dz s/p radioactive iodine), CVA, and dysphagia who presents to [facility] ED on 12/20/2023 with no PO intake and increased lethargy in the last 2-3 days, admitted for hypernatremia 166 and UTI. Hospital course complicated by AHRF likely 2/2 to aspiration pneumonitis, pending PEG evaluation   68F with PMH significant for breast cancer s/p L mastectomy, TBI (s/p a fall ~2 years ago) c/b aphasia, meningioma, hypothyroidism (hx Grave's dz s/p radioactive iodine), CVA, and dysphagia who presents to [facility] ED on 12/20/2023 with no PO intake and increased lethargy in the last 2-3 days, admitted for hypernatremia 166 and UTI. Hospital course complicated by AHRF likely 2/2 to aspiration pneumonitis, pending PEG evaluation

## 2024-01-16 NOTE — PROGRESS NOTE ADULT - PROBLEM SELECTOR PLAN 6
Hx of Grave's disease s/p radioactive iodine  - resumed home levothyroxine in IV formulation - DVT ppx: Lovenox  - GI ppx: pantoprazole  - Diet: PEG  - Dispo: pending further GOC discussions

## 2024-01-16 NOTE — PROGRESS NOTE ADULT - ATTENDING COMMENTS
68F with PMH significant for breast cancer s/p L mastectomy, TBI (s/p a fall ~2 years ago) c/b aphasia, meningioma, hypothyroidism (hx Grave's dz s/p radioactive iodine), CVA, and dysphagia who presents to [facility] ED on 12/20/2023 with no PO intake and increased lethargy.  Patient was admitted for hypernatremia 166 and UTI. Hospital course complicated by AHRF likely 2/2 to aspiration pneumonitis, pending further GOC with HCP (  )     Patient seen with her hands crossed ( intentional by patient ) , eyes open and following any commands , mouth open     #  Dysphagia with severe malnutrition      Feeding  via PEG tube       goal is to DC to a facility       oral care more frequently      CW free water boluses via PEG   # Functional quadriplegia with multiple pressure ulcers- wound care following      cont to monitor      Dietitian is on the case   #  Hypothyroidism     change to Levothyroxine Via PEG   Prognosis guarded- remains full code.     case is discussed with the resident for Team 5     Yesenia Escalanteist   available on TEAMS . 68F with PMH significant for breast cancer s/p L mastectomy, TBI (s/p a fall ~2 years ago) c/b aphasia, meningioma, hypothyroidism (hx Grave's dz s/p radioactive iodine), CVA, and dysphagia who presents to [facility] ED on 12/20/2023 with no PO intake and increased lethargy.  Patient was admitted for hypernatremia 166 and UTI. Hospital course complicated by AHRF likely 2/2 to aspiration pneumonitis, pending further GOC with HCP (  )     Patient seen with her hands crossed ( intentional by patient ) , eyes open and following any commands , mouth open     #  Dysphagia with severe malnutrition      Feeding  via PEG tube       goal is to DC to a facility       oral care more frequently      CW free water boluses via PEG   # Functional quadriplegia with multiple pressure ulcers- wound care following      cont to monitor      Dietitian is on the case   #  Hypothyroidism     change to Levothyroxine Via PEG   Prognosis guarded- remains full code.     case is discussed with the resident for Team 5     Yesenia Escalanteist   available on TEAMS .

## 2024-01-16 NOTE — PROGRESS NOTE ADULT - SUBJECTIVE AND OBJECTIVE BOX
***************************************************************  Bassem Hernandez, PG1  Internal Medicine   Pager:  TEAMS  ***************************************************************    JONATHAN YOUNGER  68y  MRN: [id number]    Patient is a 68y old  Female who presents with a chief complaint of No PO intake, lethargy (16 Jan 2024 06:39)      Interval/Overnight Events: no events ON.     Subjective: Pt seen and examined at bedside. Awakens to voice    MEDICATIONS  (STANDING):  ascorbic acid 500 milliGRAM(s) Oral daily  chlorhexidine 2% Cloths 1 Application(s) Topical daily  Dakins Solution - 1/4 Strength 1 Application(s) Topical every 8 hours  enoxaparin Injectable 40 milliGRAM(s) SubCutaneous every 24 hours  folic acid 1 milliGRAM(s) Oral daily  lactated ringers. 500 milliLiter(s) (75 mL/Hr) IV Continuous <Continuous>  levothyroxine Injectable 56 MICROGram(s) IV Push at bedtime  multivitamin 1 Tablet(s) Oral daily  pantoprazole   Suspension 40 milliGRAM(s) Oral daily  polyethylene glycol 3350 17 Gram(s) Oral daily  povidone iodine 10% Solution 1 Application(s) Topical daily  senna 2 Tablet(s) Oral at bedtime  sertraline 25 milliGRAM(s) Oral daily  sodium chloride 0.9%. 500 milliLiter(s) (30 mL/Hr) IV Continuous <Continuous>  thiamine 100 milliGRAM(s) Oral daily    MEDICATIONS  (PRN):      Objective:    Vitals: Vital Signs Last 24 Hrs  T(C): 36.3 (01-16-24 @ 06:04), Max: 37 (01-15-24 @ 20:53)  T(F): 97.4 (01-16-24 @ 06:04), Max: 98.6 (01-15-24 @ 20:53)  HR: 83 (01-16-24 @ 06:04) (71 - 83)  BP: 103/64 (01-16-24 @ 06:04) (102/63 - 121/70)  BP(mean): --  RR: 18 (01-16-24 @ 06:04) (18 - 18)  SpO2: 100% (01-16-24 @ 06:04) (97% - 100%)                I&O's Summary    15 Angel 2024 07:01  -  16 Jan 2024 07:00  --------------------------------------------------------  IN: 600 mL / OUT: 850 mL / NET: -250 mL        PHYSICAL EXAM:  GENERAL: NAD, cachetic, chronically ill appearing   HEAD:  Atraumatic, Normocephalic  EYES: PERRL, conjunctiva and sclera clear  ENMT: MMM, poor oral hygeine  NECK: Supple  CHEST/LUNG: Clear to auscultation bilaterally; No rales, rhonchi, wheezing, or rubs but difficult to assess due to contracted habitus  HEART: Regular rate and rhythm; No murmurs, rubs, or gallops  ABDOMEN: Soft, Nontender, Nondistended; Bowel sounds present  EXTREMITIES:  no clubbing, cyanosis, or edema   SKIN: No rashes or lesions  NERVOUS SYSTEM:  Alert, opens eyes to voice, contracted extremities, squeezes fingers on command  PSYCH: Unable to assess    LABS:                        9.7    6.63  )-----------( 310      ( 15 Angel 2024 07:12 )             30.8                         9.8    7.40  )-----------( 328      ( 14 Jan 2024 07:04 )             31.7     01-15    139  |  102  |  10  ----------------------------<  120<H>  4.3   |  27  |  0.31<L>  01-14    139  |  102  |  12  ----------------------------<  123<H>  4.2   |  27  |  0.33<L>    Ca    8.6      15 Angel 2024 07:12  Ca    8.4      14 Jan 2024 07:06  Phos  2.7     01-15  Mg     2.1     01-15    TPro  6.2  /  Alb  2.9<L>  /  TBili  0.3  /  DBili  x   /  AST  9<L>  /  ALT  6<L>  /  AlkPhos  73  01-15  TPro  6.3  /  Alb  2.9<L>  /  TBili  0.4  /  DBili  x   /  AST  11  /  ALT  9<L>  /  AlkPhos  70  01-14    CAPILLARY BLOOD GLUCOSE      POCT Blood Glucose.: 152 mg/dL (16 Jan 2024 07:08)  POCT Blood Glucose.: 137 mg/dL (16 Jan 2024 00:18)  POCT Blood Glucose.: 149 mg/dL (15 Angel 2024 17:30)  POCT Blood Glucose.: 135 mg/dL (15 Angel 2024 12:25)        Urinalysis Basic - ( 15 Angel 2024 07:12 )    Color: x / Appearance: x / SG: x / pH: x  Gluc: 120 mg/dL / Ketone: x  / Bili: x / Urobili: x   Blood: x / Protein: x / Nitrite: x   Leuk Esterase: x / RBC: x / WBC x   Sq Epi: x / Non Sq Epi: x / Bacteria: x          RADIOLOGY & ADDITIONAL TESTS:    Imaging Personally Reviewed:  [x ] YES  [ ] NO    Consultants involved in case:   Consultant(s) Notes Reviewed:  [ x] YES  [ ] NO:   Care Discussed with Consultants/Other Providers [x ] YES  [ ] NO         ***************************************************************  Bassem Hernandez, PG1  Internal Medicine   Pager:  TEAMS  ***************************************************************    JONATHAN YOUNGER  68y  MRN: [id number]    Patient is a 68y old  Female who presents with a chief complaint of No PO intake, lethargy (16 Jan 2024 06:39)      Interval/Overnight Events: no events ON.     Subjective: Pt seen and examined at bedside. Awakens to voice    MEDICATIONS  (STANDING):  ascorbic acid 500 milliGRAM(s) Oral daily  chlorhexidine 2% Cloths 1 Application(s) Topical daily  Dakins Solution - 1/4 Strength 1 Application(s) Topical every 8 hours  enoxaparin Injectable 40 milliGRAM(s) SubCutaneous every 24 hours  folic acid 1 milliGRAM(s) Oral daily  lactated ringers. 500 milliLiter(s) (75 mL/Hr) IV Continuous <Continuous>  levothyroxine Injectable 56 MICROGram(s) IV Push at bedtime  multivitamin 1 Tablet(s) Oral daily  pantoprazole   Suspension 40 milliGRAM(s) Oral daily  polyethylene glycol 3350 17 Gram(s) Oral daily  povidone iodine 10% Solution 1 Application(s) Topical daily  senna 2 Tablet(s) Oral at bedtime  sertraline 25 milliGRAM(s) Oral daily  sodium chloride 0.9%. 500 milliLiter(s) (30 mL/Hr) IV Continuous <Continuous>  thiamine 100 milliGRAM(s) Oral daily    MEDICATIONS  (PRN):      Objective:    Vitals: Vital Signs Last 24 Hrs  T(C): 36.3 (01-16-24 @ 06:04), Max: 37 (01-15-24 @ 20:53)  T(F): 97.4 (01-16-24 @ 06:04), Max: 98.6 (01-15-24 @ 20:53)  HR: 83 (01-16-24 @ 06:04) (71 - 83)  BP: 103/64 (01-16-24 @ 06:04) (102/63 - 121/70)  BP(mean): --  RR: 18 (01-16-24 @ 06:04) (18 - 18)  SpO2: 100% (01-16-24 @ 06:04) (97% - 100%)                I&O's Summary    15 Angel 2024 07:01  -  16 Jan 2024 07:00  --------------------------------------------------------  IN: 600 mL / OUT: 850 mL / NET: -250 mL        PHYSICAL EXAM:  GENERAL: NAD, cachetic, chronically ill appearing   HEAD:  Atraumatic, Normocephalic  EYES: PERRL, conjunctiva and sclera clear  ENMT: MMM, poor oral hygeine  NECK: Supple  CHEST/LUNG: Clear to auscultation bilaterally; No rales, rhonchi, wheezing, or rubs but difficult to assess due to contracted habitus  HEART: Regular rate and rhythm; No murmurs, rubs, or gallops  ABDOMEN: Soft, Nontender, Nondistended; Bowel sounds present  EXTREMITIES:  no clubbing, cyanosis, or edema   SKIN: No rashes or lesions  NERVOUS SYSTEM:  Alert, opens eyes to voice, contracted extremities, squeezes fingers on command  PSYCH: Unable to assess    LABS:                        9.7    6.63  )-----------( 310      ( 15 Angel 2024 07:12 )             30.8                         9.8    7.40  )-----------( 328      ( 14 Jan 2024 07:04 )             31.7     01-15    139  |  102  |  10  ----------------------------<  120<H>  4.3   |  27  |  0.31<L>  01-14    139  |  102  |  12  ----------------------------<  123<H>  4.2   |  27  |  0.33<L>    Ca    8.6      15 Angel 2024 07:12  Ca    8.4      14 Jan 2024 07:06  Phos  2.7     01-15  Mg     2.1     01-15    TPro  6.2  /  Alb  2.9<L>  /  TBili  0.3  /  DBili  x   /  AST  9<L>  /  ALT  6<L>  /  AlkPhos  73  01-15  TPro  6.3  /  Alb  2.9<L>  /  TBili  0.4  /  DBili  x   /  AST  11  /  ALT  9<L>  /  AlkPhos  70  01-14    CAPILLARY BLOOD GLUCOSE      POCT Blood Glucose.: 152 mg/dL (16 Jan 2024 07:08)  POCT Blood Glucose.: 137 mg/dL (16 Jan 2024 00:18)  POCT Blood Glucose.: 149 mg/dL (15 Angel 2024 17:30)  POCT Blood Glucose.: 135 mg/dL (15 Angel 2024 12:25)        Urinalysis Basic - ( 15 Angel 2024 07:12 )    Color: x / Appearance: x / SG: x / pH: x  Gluc: 120 mg/dL / Ketone: x  / Bili: x / Urobili: x   Blood: x / Protein: x / Nitrite: x   Leuk Esterase: x / RBC: x / WBC x   Sq Epi: x / Non Sq Epi: x / Bacteria: x          RADIOLOGY & ADDITIONAL TESTS:    Imaging Personally Reviewed:  [x ] YES  [ ] NO    Consultants involved in case:   Consultant(s) Notes Reviewed:  [ x] YES  [ ] NO:   Care Discussed with Consultants/Other Providers [x ] YES  [ ] NO

## 2024-01-17 ENCOUNTER — TRANSCRIPTION ENCOUNTER (OUTPATIENT)
Age: 69
End: 2024-01-17

## 2024-01-17 DIAGNOSIS — Z91.89 OTHER SPECIFIED PERSONAL RISK FACTORS, NOT ELSEWHERE CLASSIFIED: ICD-10-CM

## 2024-01-17 LAB
GLUCOSE BLDC GLUCOMTR-MCNC: 113 MG/DL — HIGH (ref 70–99)
GLUCOSE BLDC GLUCOMTR-MCNC: 127 MG/DL — HIGH (ref 70–99)
GLUCOSE BLDC GLUCOMTR-MCNC: 127 MG/DL — HIGH (ref 70–99)
GLUCOSE BLDC GLUCOMTR-MCNC: 131 MG/DL — HIGH (ref 70–99)
GLUCOSE BLDC GLUCOMTR-MCNC: 156 MG/DL — HIGH (ref 70–99)

## 2024-01-17 PROCEDURE — 99232 SBSQ HOSP IP/OBS MODERATE 35: CPT | Mod: GC

## 2024-01-17 RX ORDER — POLYETHYLENE GLYCOL 3350 17 G/17G
17 POWDER, FOR SOLUTION ORAL
Qty: 0 | Refills: 0 | DISCHARGE
Start: 2024-01-17

## 2024-01-17 RX ORDER — SODIUM CHLORIDE 9 MG/ML
500 INJECTION, SOLUTION INTRAVENOUS
Refills: 0 | Status: COMPLETED | OUTPATIENT
Start: 2024-01-17 | End: 2024-01-17

## 2024-01-17 RX ORDER — SENNA PLUS 8.6 MG/1
2 TABLET ORAL
Qty: 0 | Refills: 0 | DISCHARGE
Start: 2024-01-17

## 2024-01-17 RX ORDER — ASCORBIC ACID 60 MG
1 TABLET,CHEWABLE ORAL
Qty: 0 | Refills: 0 | DISCHARGE
Start: 2024-01-17

## 2024-01-17 RX ORDER — LEVOTHYROXINE SODIUM 125 MCG
1 TABLET ORAL
Qty: 0 | Refills: 0 | DISCHARGE
Start: 2024-01-17

## 2024-01-17 RX ORDER — MIDODRINE HYDROCHLORIDE 2.5 MG/1
10 TABLET ORAL THREE TIMES A DAY
Refills: 0 | Status: DISCONTINUED | OUTPATIENT
Start: 2024-01-17 | End: 2024-01-18

## 2024-01-17 RX ORDER — SODIUM CHLORIDE 9 MG/ML
500 INJECTION, SOLUTION INTRAVENOUS
Refills: 0 | Status: DISCONTINUED | OUTPATIENT
Start: 2024-01-17 | End: 2024-01-17

## 2024-01-17 RX ORDER — TAMOXIFEN CITRATE 20 MG/1
1 TABLET, FILM COATED ORAL
Qty: 0 | Refills: 0 | DISCHARGE
Start: 2024-01-17

## 2024-01-17 RX ORDER — TAMOXIFEN CITRATE 20 MG/1
20 TABLET, FILM COATED ORAL
Refills: 0 | Status: DISCONTINUED | OUTPATIENT
Start: 2024-01-18 | End: 2024-01-18

## 2024-01-17 RX ORDER — THIAMINE MONONITRATE (VIT B1) 100 MG
1 TABLET ORAL
Qty: 0 | Refills: 0 | DISCHARGE
Start: 2024-01-17

## 2024-01-17 RX ADMIN — Medication 1 MILLIGRAM(S): at 12:13

## 2024-01-17 RX ADMIN — Medication 1 APPLICATION(S): at 06:58

## 2024-01-17 RX ADMIN — SODIUM CHLORIDE 500 MILLILITER(S): 9 INJECTION, SOLUTION INTRAVENOUS at 14:34

## 2024-01-17 RX ADMIN — MIDODRINE HYDROCHLORIDE 10 MILLIGRAM(S): 2.5 TABLET ORAL at 18:27

## 2024-01-17 RX ADMIN — CHLORHEXIDINE GLUCONATE 1 APPLICATION(S): 213 SOLUTION TOPICAL at 12:12

## 2024-01-17 RX ADMIN — SENNA PLUS 2 TABLET(S): 8.6 TABLET ORAL at 21:46

## 2024-01-17 RX ADMIN — Medication 1 TABLET(S): at 12:13

## 2024-01-17 RX ADMIN — Medication 112 MICROGRAM(S): at 06:58

## 2024-01-17 RX ADMIN — Medication 1 APPLICATION(S): at 21:46

## 2024-01-17 RX ADMIN — Medication 100 MILLIGRAM(S): at 12:13

## 2024-01-17 RX ADMIN — Medication 1 APPLICATION(S): at 14:31

## 2024-01-17 RX ADMIN — Medication 500 MILLIGRAM(S): at 12:13

## 2024-01-17 RX ADMIN — PANTOPRAZOLE SODIUM 40 MILLIGRAM(S): 20 TABLET, DELAYED RELEASE ORAL at 12:12

## 2024-01-17 RX ADMIN — SERTRALINE 25 MILLIGRAM(S): 25 TABLET, FILM COATED ORAL at 12:13

## 2024-01-17 RX ADMIN — POLYETHYLENE GLYCOL 3350 17 GRAM(S): 17 POWDER, FOR SOLUTION ORAL at 12:13

## 2024-01-17 RX ADMIN — ENOXAPARIN SODIUM 40 MILLIGRAM(S): 100 INJECTION SUBCUTANEOUS at 14:31

## 2024-01-17 RX ADMIN — SODIUM CHLORIDE 500 MILLILITER(S): 9 INJECTION, SOLUTION INTRAVENOUS at 18:55

## 2024-01-17 RX ADMIN — Medication 1 APPLICATION(S): at 12:12

## 2024-01-17 NOTE — DISCHARGE NOTE NURSING/CASE MANAGEMENT/SOCIAL WORK - PATIENT PORTAL LINK FT
You can access the FollowMyHealth Patient Portal offered by [facility] by registering at the following website: [email or web address]. By joining [employer]’s FollowMyHealth portal, you will also be able to view your health information using other applications (apps) compatible with our system.

## 2024-01-17 NOTE — DISCHARGE NOTE NURSING/CASE MANAGEMENT/SOCIAL WORK - NSDCFUADDAPPT_GEN_ALL_CORE_FT
APPTS ARE READY TO BE MADE: [X] YES    Best Family or Patient Contact (if needed): Dr Silverio Hendricks [phone]    Additional Information about above appointments (if needed):    1: Appointments may need to be visits at skilled nursing facility  2:   3:     Other comments or requests:

## 2024-01-17 NOTE — DISCHARGE NOTE NURSING/CASE MANAGEMENT/SOCIAL WORK - NSDCVIVACCINE_GEN_ALL_CORE_FT
Tdap; 21-Apr-2020 23:10; Melani Altamirano (RN); [employer]; [id number] (Exp. Date: 17-Sep-2021); IntraMuscular; Deltoid Right.; 0.5 milliLiter(s); VIS (VIS Published: 09-May-2013, VIS Presented: 21-Apr-2020);   Tdap; 19-Apr-2021 16:09; Daly Torrez (RN); [employer]; [id number] (Exp. Date: 03-Sep-2021); IntraMuscular; Dorsogluteal Left.; 0.5 milliLiter(s); VIS (VIS Published: 09-May-2013, VIS Presented: 19-Apr-2021);   Tdap; 18-Sep-2023 21:15; Dasha Bowens (RN); [employer]; [id number] (Exp. Date: 06-Jun-2025); IntraMuscular; Deltoid Right.; 0.5 milliLiter(s); VIS (VIS Published: 09-May-2013, VIS Presented: 18-Sep-2023);

## 2024-01-17 NOTE — PROGRESS NOTE ADULT - PROBLEM SELECTOR PLAN 6
- DVT ppx: Lovenox  - GI ppx: pantoprazole  - Diet: PEG  - Dispo: pending further GOC discussions Hx of Grave's disease s/p radioactive iodine  - resumed home levothyroxine in IV formulation Hx of Grave's disease s/p radioactive iodine  - resumed home levothyroxine in PEG  formulation

## 2024-01-17 NOTE — PROGRESS NOTE ADULT - ATTENDING COMMENTS
noted with Soft BP most days in the hospitalization course, BP remains low despite small IVF bolus , with stable HR.  Patient is able to tolerate IVF, hypotension mostly in setting of intravascular depletion with malnutrition.  Will give 1 L total of IVF , monitor BP and SPO2, will get U/A, monitor urine output, Last TSH was stable .  if stable , will DC in AM .  COnt to optimize enteral feeding and enteral free water       Yesenia Brown   HOspitalist   available on TEAMS

## 2024-01-17 NOTE — PROGRESS NOTE ADULT - PROBLEM SELECTOR PLAN 5
Hx of Grave's disease s/p radioactive iodine  - resumed home levothyroxine in IV formulation Hx of breast cancer, s/p L mastectomy  - Holding home Tamoxifen (NPO) Hx of breast cancer, s/p L mastectomy  - Holding home Tamoxifen --> RESTART

## 2024-01-17 NOTE — PROGRESS NOTE ADULT - PROBLEM SELECTOR PLAN 4
Hx of breast cancer, s/p L mastectomy  - Holding home Tamoxifen (NPO) Patient has what appears to be plaque on the roof of her mouth and her tongue.  Comes off with scraping.  Does not appear to be thrush.  Will need outpatient dental follow up.

## 2024-01-17 NOTE — PROGRESS NOTE ADULT - ASSESSMENT
68F with PMH significant for breast cancer s/p L mastectomy, TBI (s/p a fall ~2 years ago) c/b aphasia, meningioma, hypothyroidism (hx Grave's dz s/p radioactive iodine), CVA, and dysphagia who presents to [facility] ED on 12/20/2023 with no PO intake and increased lethargy in the last 2-3 days, admitted for hypernatremia 166 and UTI. Hospital course complicated by AHRF likely 2/2 to aspiration pneumonitis, pending PEG evaluation

## 2024-01-17 NOTE — PROGRESS NOTE ADULT - PROBLEM SELECTOR PLAN 1
Hx of dysphagia since 2-3 months ago per son. Notable for severe b/l ext contracture and cachexia. Low PO intake at baseline, no PO intake in the last 2-3 days. NGT placed 12/22, subsequent removals and replacements with most recent placed on 1/7.  SLP cleared patient for Puree/moderately thick liquids via TSP amount only only when the patient is awake and alert; however pt unlikely to get adequate calories.  understands risks of aspiration but would like to attempt feeding.  GI consulted for PEG, ongoing GOC on final decision from family.   would like to explore any reversible causes prior to PEG placement.  However discussed w/  worsening PO intake and dysphagia likely 2/2 to progression of disease, unlikely reversible.   would like to try boosting her caloric intake with tube feed supplementation to see if that improves her ability to tolerate food.   would like to try PO feeding with TSP is patient tolerates, understands and accepts risk of aspiration.  After patient had not had much recovery in functional status with feeding, palliative care discussion occurred on 1/10 with  requesting PEG placement after risks and benefits were discussed.   would ultimately desire that patient regains some functional capacity, and would consider taking patient home with health aid or rehabilitation as well.  S/p PEG 1/11, cleared by GI for use, will now hold oral feeding pending further discussions with  about pleasure feeds  - Continue tube feeds   - Glucerna 1.2 @ starting at 10ml/hr and advance by [id number] or as tolerated to goal rate of 50 mL/hr x 24hrs  - Considering transition to bolus feeds once tolerating continuous feeds at goal rate, bolus feeds of Glucerna 1.2 @ 237ml (1 can) 5x/daily, depending on placement requirements  - Will hold on PO feeding with TSP  - Palliative care consulted  - Appreciate GI PEG placement  - Rigorous oral hygeine Hx of dysphagia since 2-3 months ago per son. Notable for severe b/l ext contracture and cachexia. SLP cleared patient for Puree/moderately thick liquids via TSP amount only only when the patient is awake and alert; however pt unlikely to get adequate calories.  understands risks of aspiration but would like to attempt feeding.  GI consulted for PEG, ongoing GOC on final decision from family.   would like to explore any reversible causes prior to PEG placement.  However discussed w/  worsening PO intake and dysphagia likely 2/2 to progression of disease, unlikely reversible.   would like to try boosting her caloric intake with tube feed supplementation to see if that improves her ability to tolerate food.     After patient had not had much recovery in functional status with feeding, palliative care discussion occurred on 1/10 with  requesting PEG placement after risks and benefits were discussed.   would ultimately desire that patient regains some functional capacity, and would consider taking patient home with health aid or rehabilitation as well.  S/p PEG 1/11, cleared by GI for use, will now hold oral feeding pending further discussions with  about pleasure feeds  - Continue tube feeds   - Glucerna 1.2 @ starting at 10ml/hr and advance by [id number] or as tolerated to goal rate of 50 mL/hr x 24hrs  - Considering transition to bolus feeds once tolerating continuous feeds at goal rate, bolus feeds of Glucerna 1.2 @ 237ml (1 can) 5x/daily, depending on placement requirements  - Will hold on PO feeding with TSP  - Palliative care consulted  - Appreciate GI PEG placement  - Rigorous oral hygeine

## 2024-01-17 NOTE — DISCHARGE NOTE NURSING/CASE MANAGEMENT/SOCIAL WORK - NSDCPEFALRISK_GEN_ALL_CORE
For information on Fall & Injury Prevention, visit: [email or web address] OR  [email or web address] OR  https://www.cdc.gov/steadi/patient.html

## 2024-01-17 NOTE — PROGRESS NOTE ADULT - SUBJECTIVE AND OBJECTIVE BOX
***************************************************************  Bassem Hernadnez, PG1  Internal Medicine   Pager:  TEAMS  ***************************************************************    JONATHAN YOUNGER  68y  MRN: [id number]    Patient is a 68y old  Female who presents with a chief complaint of No PO intake, lethargy (16 Jan 2024 06:39)      Interval/Overnight Events: no events ON.     Subjective: Pt seen and examined at bedside. Denies fever, CP, SOB, abn pain, N/V, dysuria. Tolerating diet.      MEDICATIONS  (STANDING):  ascorbic acid 500 milliGRAM(s) Oral daily  chlorhexidine 2% Cloths 1 Application(s) Topical daily  Dakins Solution - 1/4 Strength 1 Application(s) Topical every 8 hours  enoxaparin Injectable 40 milliGRAM(s) SubCutaneous every 24 hours  folic acid 1 milliGRAM(s) Oral daily  lactated ringers. 500 milliLiter(s) (75 mL/Hr) IV Continuous <Continuous>  lactated ringers. 500 milliLiter(s) (50 mL/Hr) IV Continuous <Continuous>  levothyroxine 112 MICROGram(s) Enteral Tube daily  multivitamin 1 Tablet(s) Oral daily  pantoprazole   Suspension 40 milliGRAM(s) Oral daily  polyethylene glycol 3350 17 Gram(s) Oral daily  povidone iodine 10% Solution 1 Application(s) Topical daily  senna 2 Tablet(s) Oral at bedtime  sertraline 25 milliGRAM(s) Oral daily  sodium chloride 0.9%. 500 milliLiter(s) (30 mL/Hr) IV Continuous <Continuous>  thiamine 100 milliGRAM(s) Oral daily    MEDICATIONS  (PRN):      Objective:    Vitals: Vital Signs Last 24 Hrs  T(C): 36.4 (01-17-24 @ 05:01), Max: 36.8 (01-16-24 @ 21:14)  T(F): 97.6 (01-17-24 @ 05:01), Max: 98.2 (01-16-24 @ 21:14)  HR: 94 (01-17-24 @ 05:01) (77 - 94)  BP: 99/61 (01-17-24 @ 05:01) (99/61 - 115/68)  BP(mean): --  RR: 18 (01-17-24 @ 05:01) (18 - 18)  SpO2: 100% (01-17-24 @ 05:01) (99% - 100%)                I&O's Summary    15 Angel 2024 07:01  -  16 Jan 2024 07:00  --------------------------------------------------------  IN: 600 mL / OUT: 850 mL / NET: -250 mL        PHYSICAL EXAM:  GENERAL: NAD, cachetic, chronically ill appearing   HEAD:  Atraumatic, Normocephalic  EYES: PERRL, conjunctiva and sclera clear  ENMT: MMM, poor oral hygeine  NECK: Supple  CHEST/LUNG: Clear to auscultation bilaterally; No rales, rhonchi, wheezing, or rubs but difficult to assess due to contracted habitus  HEART: Regular rate and rhythm; No murmurs, rubs, or gallops  ABDOMEN: Soft, Nontender, Nondistended; Bowel sounds present  EXTREMITIES:  no clubbing, cyanosis, or edema   SKIN: No rashes or lesions  NERVOUS SYSTEM:  Alert, opens eyes to voice, contracted extremities, squeezes fingers on command  PSYCH: Unable to assess    LABS:                        9.7    6.63  )-----------( 310      ( 15 Angel 2024 07:12 )             30.8                         9.8    7.40  )-----------( 328      ( 14 Jan 2024 07:04 )             31.7     01-15    139  |  102  |  10  ----------------------------<  120<H>  4.3   |  27  |  0.31<L>  01-14    139  |  102  |  12  ----------------------------<  123<H>  4.2   |  27  |  0.33<L>    Ca    8.6      15 Angel 2024 07:12  Ca    8.4      14 Jan 2024 07:06  Phos  2.7     01-15  Mg     2.1     01-15    TPro  6.2  /  Alb  2.9<L>  /  TBili  0.3  /  DBili  x   /  AST  9<L>  /  ALT  6<L>  /  AlkPhos  73  01-15  TPro  6.3  /  Alb  2.9<L>  /  TBili  0.4  /  DBili  x   /  AST  11  /  ALT  9<L>  /  AlkPhos  70  01-14    CAPILLARY BLOOD GLUCOSE      POCT Blood Glucose.: 131 mg/dL (17 Jan 2024 04:25)  POCT Blood Glucose.: 124 mg/dL (16 Jan 2024 22:25)  POCT Blood Glucose.: 120 mg/dL (16 Jan 2024 16:00)  POCT Blood Glucose.: 152 mg/dL (16 Jan 2024 07:08)        Urinalysis Basic - ( 15 Angel 2024 07:12 )    Color: x / Appearance: x / SG: x / pH: x  Gluc: 120 mg/dL / Ketone: x  / Bili: x / Urobili: x   Blood: x / Protein: x / Nitrite: x   Leuk Esterase: x / RBC: x / WBC x   Sq Epi: x / Non Sq Epi: x / Bacteria: x          RADIOLOGY & ADDITIONAL TESTS:    Imaging Personally Reviewed:  [x ] YES  [ ] NO    Consultants involved in case:   Consultant(s) Notes Reviewed:  [ x] YES  [ ] NO:   Care Discussed with Consultants/Other Providers [x ] YES  [ ] NO         ***************************************************************  Bassem Hernandez, PG1  Internal Medicine   Pager:  TEAMS  ***************************************************************    JONATHAN YOUNGER  68y  MRN: [id number]    Patient is a 68y old  Female who presents with a chief complaint of No PO intake, lethargy (16 Jan 2024 06:39)      Interval/Overnight Events: no events ON.     Subjective: Pt seen and examined at bedside. Awakens to voice.  Tolerating tube feeds    MEDICATIONS  (STANDING):  ascorbic acid 500 milliGRAM(s) Oral daily  chlorhexidine 2% Cloths 1 Application(s) Topical daily  Dakins Solution - 1/4 Strength 1 Application(s) Topical every 8 hours  enoxaparin Injectable 40 milliGRAM(s) SubCutaneous every 24 hours  folic acid 1 milliGRAM(s) Oral daily  lactated ringers. 500 milliLiter(s) (75 mL/Hr) IV Continuous <Continuous>  lactated ringers. 500 milliLiter(s) (50 mL/Hr) IV Continuous <Continuous>  levothyroxine 112 MICROGram(s) Enteral Tube daily  multivitamin 1 Tablet(s) Oral daily  pantoprazole   Suspension 40 milliGRAM(s) Oral daily  polyethylene glycol 3350 17 Gram(s) Oral daily  povidone iodine 10% Solution 1 Application(s) Topical daily  senna 2 Tablet(s) Oral at bedtime  sertraline 25 milliGRAM(s) Oral daily  sodium chloride 0.9%. 500 milliLiter(s) (30 mL/Hr) IV Continuous <Continuous>  thiamine 100 milliGRAM(s) Oral daily    MEDICATIONS  (PRN):      Objective:    Vitals: Vital Signs Last 24 Hrs  T(C): 36.4 (01-17-24 @ 05:01), Max: 36.8 (01-16-24 @ 21:14)  T(F): 97.6 (01-17-24 @ 05:01), Max: 98.2 (01-16-24 @ 21:14)  HR: 94 (01-17-24 @ 05:01) (77 - 94)  BP: 99/61 (01-17-24 @ 05:01) (99/61 - 115/68)  BP(mean): --  RR: 18 (01-17-24 @ 05:01) (18 - 18)  SpO2: 100% (01-17-24 @ 05:01) (99% - 100%)                I&O's Summary    15 Angel 2024 07:01  -  16 Jan 2024 07:00  --------------------------------------------------------  IN: 600 mL / OUT: 850 mL / NET: -250 mL        PHYSICAL EXAM:  GENERAL: NAD, cachetic, chronically ill appearing   HEAD:  Atraumatic, Normocephalic  EYES: PERRL, conjunctiva and sclera clear  ENMT: MMM, poor oral hygiene  NECK: Supple  CHEST/LUNG: Clear to auscultation bilaterally; No rales, rhonchi, wheezing, or rubs but difficult to assess due to contracted habitus  HEART: Regular rate and rhythm; No murmurs, rubs, or gallops  ABDOMEN: Soft, Nontender, Nondistended; Bowel sounds present  EXTREMITIES:  no clubbing, cyanosis, or edema   SKIN: No rashes or lesions  NERVOUS SYSTEM:  Alert, opens eyes to voice, contracted extremities, squeezes fingers on command  PSYCH: Unable to assess    LABS:                        9.7    6.63  )-----------( 310      ( 15 Angel 2024 07:12 )             30.8                         9.8    7.40  )-----------( 328      ( 14 Jan 2024 07:04 )             31.7     01-15    139  |  102  |  10  ----------------------------<  120<H>  4.3   |  27  |  0.31<L>  01-14    139  |  102  |  12  ----------------------------<  123<H>  4.2   |  27  |  0.33<L>    Ca    8.6      15 Angel 2024 07:12  Ca    8.4      14 Jan 2024 07:06  Phos  2.7     01-15  Mg     2.1     01-15    TPro  6.2  /  Alb  2.9<L>  /  TBili  0.3  /  DBili  x   /  AST  9<L>  /  ALT  6<L>  /  AlkPhos  73  01-15  TPro  6.3  /  Alb  2.9<L>  /  TBili  0.4  /  DBili  x   /  AST  11  /  ALT  9<L>  /  AlkPhos  70  01-14    CAPILLARY BLOOD GLUCOSE      POCT Blood Glucose.: 131 mg/dL (17 Jan 2024 04:25)  POCT Blood Glucose.: 124 mg/dL (16 Jan 2024 22:25)  POCT Blood Glucose.: 120 mg/dL (16 Jan 2024 16:00)  POCT Blood Glucose.: 152 mg/dL (16 Jan 2024 07:08)        Urinalysis Basic - ( 15 Angel 2024 07:12 )    Color: x / Appearance: x / SG: x / pH: x  Gluc: 120 mg/dL / Ketone: x  / Bili: x / Urobili: x   Blood: x / Protein: x / Nitrite: x   Leuk Esterase: x / RBC: x / WBC x   Sq Epi: x / Non Sq Epi: x / Bacteria: x          RADIOLOGY & ADDITIONAL TESTS:    Imaging Personally Reviewed:  [x ] YES  [ ] NO    Consultants involved in case:   Consultant(s) Notes Reviewed:  [ x] YES  [ ] NO:   Care Discussed with Consultants/Other Providers [x ] YES  [ ] NO         ***************************************************************  Bassem Hernandez, PG1  Internal Medicine   Pager:  TEAMS  ***************************************************************    JONATHAN YOUNGER  68y  MRN: [id number]    Patient is a 68y old  Female who presents with a chief complaint of No PO intake, lethargy (16 Jan 2024 06:39)    Interval/Overnight Events: no events ON.     Subjective: Pt seen and examined at bedside. Awakens to voice.  Tolerating tube feeds    MEDICATIONS  (STANDING):  ascorbic acid 500 milliGRAM(s) Oral daily  chlorhexidine 2% Cloths 1 Application(s) Topical daily  Dakins Solution - 1/4 Strength 1 Application(s) Topical every 8 hours  enoxaparin Injectable 40 milliGRAM(s) SubCutaneous every 24 hours  folic acid 1 milliGRAM(s) Oral daily  lactated ringers. 500 milliLiter(s) (75 mL/Hr) IV Continuous <Continuous>  lactated ringers. 500 milliLiter(s) (50 mL/Hr) IV Continuous <Continuous>  levothyroxine 112 MICROGram(s) Enteral Tube daily  multivitamin 1 Tablet(s) Oral daily  pantoprazole   Suspension 40 milliGRAM(s) Oral daily  polyethylene glycol 3350 17 Gram(s) Oral daily  povidone iodine 10% Solution 1 Application(s) Topical daily  senna 2 Tablet(s) Oral at bedtime  sertraline 25 milliGRAM(s) Oral daily  sodium chloride 0.9%. 500 milliLiter(s) (30 mL/Hr) IV Continuous <Continuous>  thiamine 100 milliGRAM(s) Oral daily    MEDICATIONS  (PRN):      Objective:    Vitals: Vital Signs Last 24 Hrs  T(C): 36.4 (01-17-24 @ 05:01), Max: 36.8 (01-16-24 @ 21:14)  T(F): 97.6 (01-17-24 @ 05:01), Max: 98.2 (01-16-24 @ 21:14)  HR: 94 (01-17-24 @ 05:01) (77 - 94)  BP: 99/61 (01-17-24 @ 05:01) (99/61 - 115/68)  BP(mean): --  RR: 18 (01-17-24 @ 05:01) (18 - 18)  SpO2: 100% (01-17-24 @ 05:01) (99% - 100%)                I&O's Summary    15 Angel 2024 07:01  -  16 Jan 2024 07:00  --------------------------------------------------------  IN: 600 mL / OUT: 850 mL / NET: -250 mL        PHYSICAL EXAM:  GENERAL: NAD, cachetic, chronically ill appearing   HEAD:  Atraumatic, Normocephalic  EYES: PERRL, conjunctiva and sclera clear  ENMT: MMM, poor oral hygiene  NECK: Supple  CHEST/LUNG: Clear to auscultation bilaterally; No rales, rhonchi, wheezing, or rubs but difficult to assess due to contracted habitus  HEART: Regular rate and rhythm; No murmurs, rubs, or gallops  ABDOMEN: Soft, Nontender, Nondistended; Bowel sounds present  EXTREMITIES:  no clubbing, cyanosis, or edema   SKIN: No rashes or lesions  NERVOUS SYSTEM:  Alert, opens eyes to voice, contracted extremities  PSYCH: Unable to assess    LABS:                        9.7    6.63  )-----------( 310      ( 15 Angel 2024 07:12 )             30.8                         9.8    7.40  )-----------( 328      ( 14 Jan 2024 07:04 )             31.7     01-15    139  |  102  |  10  ----------------------------<  120<H>  4.3   |  27  |  0.31<L>  01-14    139  |  102  |  12  ----------------------------<  123<H>  4.2   |  27  |  0.33<L>    Ca    8.6      15 Angel 2024 07:12  Ca    8.4      14 Jan 2024 07:06  Phos  2.7     01-15  Mg     2.1     01-15    TPro  6.2  /  Alb  2.9<L>  /  TBili  0.3  /  DBili  x   /  AST  9<L>  /  ALT  6<L>  /  AlkPhos  73  01-15  TPro  6.3  /  Alb  2.9<L>  /  TBili  0.4  /  DBili  x   /  AST  11  /  ALT  9<L>  /  AlkPhos  70  01-14    CAPILLARY BLOOD GLUCOSE      POCT Blood Glucose.: 131 mg/dL (17 Jan 2024 04:25)  POCT Blood Glucose.: 124 mg/dL (16 Jan 2024 22:25)  POCT Blood Glucose.: 120 mg/dL (16 Jan 2024 16:00)  POCT Blood Glucose.: 152 mg/dL (16 Jan 2024 07:08)        Urinalysis Basic - ( 15 Angel 2024 07:12 )    Color: x / Appearance: x / SG: x / pH: x  Gluc: 120 mg/dL / Ketone: x  / Bili: x / Urobili: x   Blood: x / Protein: x / Nitrite: x   Leuk Esterase: x / RBC: x / WBC x   Sq Epi: x / Non Sq Epi: x / Bacteria: x          RADIOLOGY & ADDITIONAL TESTS:    Imaging Personally Reviewed:  [x ] YES  [ ] NO    Consultants involved in case:   Consultant(s) Notes Reviewed:  [ x] YES  [ ] NO:   Care Discussed with Consultants/Other Providers [x ] YES  [ ] NO

## 2024-01-18 VITALS
HEART RATE: 90 BPM | SYSTOLIC BLOOD PRESSURE: 113 MMHG | RESPIRATION RATE: 18 BRPM | DIASTOLIC BLOOD PRESSURE: 65 MMHG | TEMPERATURE: 98 F | OXYGEN SATURATION: 100 %

## 2024-01-18 DIAGNOSIS — R33.9 RETENTION OF URINE, UNSPECIFIED: ICD-10-CM

## 2024-01-18 LAB
APPEARANCE UR: CLEAR — SIGNIFICANT CHANGE UP
BACTERIA # UR AUTO: NEGATIVE /HPF — SIGNIFICANT CHANGE UP
BILIRUB UR-MCNC: NEGATIVE — SIGNIFICANT CHANGE UP
CAST: 0 /LPF — SIGNIFICANT CHANGE UP (ref 0–4)
COLOR SPEC: YELLOW — SIGNIFICANT CHANGE UP
DIFF PNL FLD: NEGATIVE — SIGNIFICANT CHANGE UP
GLUCOSE BLDC GLUCOMTR-MCNC: 109 MG/DL — HIGH (ref 70–99)
GLUCOSE BLDC GLUCOMTR-MCNC: 152 MG/DL — HIGH (ref 70–99)
GLUCOSE UR QL: NEGATIVE MG/DL — SIGNIFICANT CHANGE UP
KETONES UR-MCNC: NEGATIVE MG/DL — SIGNIFICANT CHANGE UP
LEUKOCYTE ESTERASE UR-ACNC: NEGATIVE — SIGNIFICANT CHANGE UP
NITRITE UR-MCNC: NEGATIVE — SIGNIFICANT CHANGE UP
PH UR: 8 — SIGNIFICANT CHANGE UP (ref 5–8)
PROT UR-MCNC: NEGATIVE MG/DL — SIGNIFICANT CHANGE UP
RBC CASTS # UR COMP ASSIST: 0 /HPF — SIGNIFICANT CHANGE UP (ref 0–4)
SP GR SPEC: 1.01 — SIGNIFICANT CHANGE UP (ref 1–1.03)
SQUAMOUS # UR AUTO: 0 /HPF — SIGNIFICANT CHANGE UP (ref 0–5)
UROBILINOGEN FLD QL: 1 MG/DL — SIGNIFICANT CHANGE UP (ref 0.2–1)
WBC UR QL: 0 /HPF — SIGNIFICANT CHANGE UP (ref 0–5)

## 2024-01-18 PROCEDURE — 99239 HOSP IP/OBS DSCHRG MGMT >30: CPT | Mod: GC

## 2024-01-18 PROCEDURE — 99233 SBSQ HOSP IP/OBS HIGH 50: CPT

## 2024-01-18 RX ORDER — ALBUMIN HUMAN 25 %
100 VIAL (ML) INTRAVENOUS ONCE
Refills: 0 | Status: COMPLETED | OUTPATIENT
Start: 2024-01-18 | End: 2024-01-18

## 2024-01-18 RX ORDER — SODIUM CHLORIDE 9 MG/ML
500 INJECTION, SOLUTION INTRAVENOUS
Refills: 0 | Status: DISCONTINUED | OUTPATIENT
Start: 2024-01-18 | End: 2024-01-18

## 2024-01-18 RX ADMIN — CHLORHEXIDINE GLUCONATE 1 APPLICATION(S): 213 SOLUTION TOPICAL at 11:02

## 2024-01-18 RX ADMIN — PANTOPRAZOLE SODIUM 40 MILLIGRAM(S): 20 TABLET, DELAYED RELEASE ORAL at 11:03

## 2024-01-18 RX ADMIN — Medication 112 MICROGRAM(S): at 06:41

## 2024-01-18 RX ADMIN — Medication 1 MILLIGRAM(S): at 11:03

## 2024-01-18 RX ADMIN — Medication 1 APPLICATION(S): at 14:52

## 2024-01-18 RX ADMIN — Medication 1 TABLET(S): at 11:03

## 2024-01-18 RX ADMIN — Medication 1 APPLICATION(S): at 11:03

## 2024-01-18 RX ADMIN — Medication 500 MILLIGRAM(S): at 11:03

## 2024-01-18 RX ADMIN — SERTRALINE 25 MILLIGRAM(S): 25 TABLET, FILM COATED ORAL at 11:03

## 2024-01-18 RX ADMIN — SODIUM CHLORIDE 500 MILLILITER(S): 9 INJECTION, SOLUTION INTRAVENOUS at 09:49

## 2024-01-18 RX ADMIN — Medication 50 MILLILITER(S): at 10:30

## 2024-01-18 RX ADMIN — Medication 100 MILLIGRAM(S): at 11:03

## 2024-01-18 RX ADMIN — Medication 1 APPLICATION(S): at 06:42

## 2024-01-18 RX ADMIN — MIDODRINE HYDROCHLORIDE 10 MILLIGRAM(S): 2.5 TABLET ORAL at 06:41

## 2024-01-18 RX ADMIN — ENOXAPARIN SODIUM 40 MILLIGRAM(S): 100 INJECTION SUBCUTANEOUS at 14:52

## 2024-01-18 NOTE — PROGRESS NOTE ADULT - ASSESSMENT
68F with PMH significant for breast cancer s/p L mastectomy, TBI (s/p a fall ~2 years ago) c/b aphasia, meningioma, hypothyroidism (hx Grave's dz s/p radioactive iodine), CVA, and dysphagia who presents to [facility] ED on 12/20/2023 with no PO intake and increased lethargy in the last 2-3 days, admitted for hypernatremia 166 and UTI. Hospital course complicated by AHRF likely 2/2 to aspiration pneumonitis, pending PEG evaluation   68F with PMH significant for breast cancer s/p L mastectomy, TBI (s/p a fall ~2 years ago) c/b aphasia, meningioma, hypothyroidism (hx Grave's dz s/p radioactive iodine), CVA, and dysphagia who presents to [facility] ED on 12/20/2023 with no PO intake and increased lethargy in the last 2-3 days, admitted for hypernatremia 166 and UTI. Hospital course complicated by AHRF likely 2/2 to aspiration pneumonitis.  Patient is s/p PEG placement.

## 2024-01-18 NOTE — PROGRESS NOTE ADULT - PROBLEM SELECTOR PLAN 4
Patient has what appears to be plaque on the roof of her mouth and her tongue.  Comes off with scraping.  Does not appear to be thrush.  Will need outpatient dental follow up.

## 2024-01-18 NOTE — PROGRESS NOTE ADULT - PROBLEM SELECTOR PLAN 1
Hx of dysphagia since 2-3 months ago per son. Notable for severe b/l ext contracture and cachexia. SLP cleared patient for Puree/moderately thick liquids via TSP amount only only when the patient is awake and alert; however pt unlikely to get adequate calories.  understands risks of aspiration but would like to attempt feeding.  GI consulted for PEG, ongoing GOC on final decision from family.   would like to explore any reversible causes prior to PEG placement.  However discussed w/  worsening PO intake and dysphagia likely 2/2 to progression of disease, unlikely reversible.   would like to try boosting her caloric intake with tube feed supplementation to see if that improves her ability to tolerate food.     After patient had not had much recovery in functional status with feeding, palliative care discussion occurred on 1/10 with  requesting PEG placement after risks and benefits were discussed.   would ultimately desire that patient regains some functional capacity, and would consider taking patient home with health aid or rehabilitation as well.  S/p PEG 1/11, cleared by GI for use, will now hold oral feeding pending further discussions with  about pleasure feeds  - Continue tube feeds   - Glucerna 1.2 @ starting at 10ml/hr and advance by [id number] or as tolerated to goal rate of 50 mL/hr x 24hrs  - Considering transition to bolus feeds once tolerating continuous feeds at goal rate, bolus feeds of Glucerna 1.2 @ 237ml (1 can) 5x/daily, depending on placement requirements  - Will hold on PO feeding with TSP  - Palliative care consulted  - Appreciate GI PEG placement  - Rigorous oral hygeine Hx of dysphagia since 2-3 months ago per son. Notable for severe b/l ext contracture and cachexia. SLP cleared patient for Puree/moderately thick liquids via TSP amount only only when the patient is awake and alert; however pt unlikely to get adequate calories.  understands risks of aspiration but would like to attempt feeding.  GI consulted for PEG, ongoing GOC on final decision from family.   would like to explore any reversible causes prior to PEG placement.  However discussed w/  worsening PO intake and dysphagia likely 2/2 to progression of disease, unlikely reversible.   would like to try boosting her caloric intake with tube feed supplementation to see if that improves her ability to tolerate food.  After patient had not had much recovery in functional status with feeding, palliative care discussion occurred on 1/10 with  requesting PEG placement after risks and benefits were discussed.   would ultimately desire that patient regains some functional capacity, and would consider taking patient home with health aid or rehabilitation as well.  S/p PEG 1/11, cleared by GI for use, will now hold oral feeding pending further discussions with  about pleasure feeds  - Continue tube feeds   - Glucerna 1.2 @ starting at 10ml/hr and advance by [id number] or as tolerated to goal rate of 50 mL/hr x 24hrs  - Considering transition to bolus feeds once tolerating continuous feeds at goal rate, bolus feeds of Glucerna 1.2 @ 237ml (1 can) 5x/daily, depending on placement requirements  - Will hold on PO feeding with TSP  - Palliative care consulted  - Appreciate GI PEG placement  - Rigorous oral hygeine Hx of dysphagia since 2-3 months ago per son. Notable for severe b/l ext contracture and cachexia. SLP cleared patient for Puree/moderately thick liquids via TSP amount only only when the patient is awake and alert; however pt unlikely to get adequate calories.  understands risks of aspiration but would like to attempt feeding.  GI consulted for PEG, ongoing GOC on final decision from family.   would like to explore any reversible causes prior to PEG placement.  However discussed w/  worsening PO intake and dysphagia likely 2/2 to progression of disease, unlikely reversible.   would like to try boosting her caloric intake with tube feed supplementation to see if that improves her ability to tolerate food.  After patient had not had much recovery in functional status with feeding, palliative care discussion occurred on 1/10 with  requesting PEG placement after risks and benefits were discussed.   would ultimately desire that patient regains some functional capacity, and would consider taking patient home with health aid or rehabilitation as well.  S/p PEG 1/11, cleared by GI for use, will now hold oral feeding pending further discussions with  about pleasure feeds  - Continue tube feeds   - Glucerna 1.2 @ starting at 10ml/hr and advance by [id number] or as tolerated to goal rate of 50 mL/hr x 24hrs  - Considering transition to bolus feeds once tolerating continuous feeds at goal rate, bolus feeds of Glucerna 1.2 @ 237ml (1 can) 5x/daily, depending on placement requirements  - Palliative care consulted  - Appreciate GI PEG placement  - Rigorous oral hygeine

## 2024-01-18 NOTE — PROGRESS NOTE ADULT - ASSESSMENT
68F with PMH significant for breast cancer s/p L mastectomy, TBI (s/p a fall ~2 years ago) c/b aphasia, meningioma, hypothyroidism (hx Grave's dz s/p radioactive iodine), CVA, and dysphagia who presents to [facility] ED on 12/20/2023 with no PO intake and increased lethargy in the last 2-3 days. Found to have significant hypernatremia to 166 (checking q6h), positive UA with borderline leukocytosis with PMN predominance. Pending culture results. RVP and CTH negative. Afebrile, on RA, hypotensive to 90/60 s/p 1L IVF bolus. Physical exam notable for encephalopathy AOx0 and non-verbal, cachexia and b/l upper/lower ext contracture, and multiple pressure ulcers of varying stages (I-III).      Wound Consult requested to assist w/ management of  Sacral/ Buttocks /rt  hip stage 4 pressure injury  left hip/spine  unstageable pressure injury  Lt Shoulder DTI resolving  Incontinence Associated Dermatitis  Lt foot DTI    Rt Hip & Sacrum - pack w/ Medihoney & Aquacel + ALLEVYN dressing QD  Buttocks - Medihoney + ALLEVYN dressing QD  Lt Hip Aquacel + ALLEVYN  dressing QD  Spine - Betadine + ALLEVYN QD  Buttocks CAVILON Advance TIW and pericare BID and prn soiling        Continue w/ attends under pads and Pericare as per protocol  Lt shoulder- Allevyn QD  Left foot DTI--Betadine QD  Appreciate Palliative & GOC  Abx per Medicine  Ongoing GOC, appreciate Palliative Consult  Moisturize intact skin w/ SWEEN cream BID  Nutrition optimization Severe protein-calorie malnutrition       high quality protein TF, mor/ prosource, MVI & Vit C to promote wound healing       Appreciate RD & S&S input  Continue turning and positioning w/ offloading assistive devices as per protocol  Waffle Cushion to chair when oob to chair  Continue w/ low air loss pressure redistribution bed surface   Pt will need Group 2 mattress on hospital bed and ROHO cushion for wheel chair upon discharge home  Care as per medicine, will follow w/ you  Upon discharge f/u as outpatient at [facility] [phone]  D/w team & RN & attng  Codi Thompson PA-C [id number]  Nights/ Weekends/ Holidays please call:  General Surgery Consult pager [phone]) for emergencies  Wound PT for multilayer leg wrapping or VAC issues ([phone])   I spent 50 minutes face to face w/ this pt of which more than 50% of the time was spent counseling & coordinating care of this pt.

## 2024-01-18 NOTE — PROGRESS NOTE ADULT - PROVIDER SPECIALTY LIST ADULT
Gastroenterology
Internal Medicine
Wound Care
Wound Care
Gastroenterology
Wound Care
Wound Care
Palliative Care
Internal Medicine
Palliative Care
Internal Medicine

## 2024-01-18 NOTE — PROGRESS NOTE ADULT - REASON FOR ADMISSION
No PO intake, lethargy

## 2024-01-18 NOTE — PROGRESS NOTE ADULT - SUBJECTIVE AND OBJECTIVE BOX
***************************************************************  Bassem Hernandez, PG1  Internal Medicine   Pager:  [phone]  ***************************************************************    JONATHAN YOUNGER  68y  MRN: [id number]    Patient is a 68y old  Female who presents with a chief complaint of No PO intake, lethargy (2024 06:35)      Interval/Overnight Events: Prior to discharge yesterday was hypotensive.  Given 1L fluids and put on midodrine 10 TID.  BP subsequently improved to SBP 100s.  Found to be retaining urine, straight cath [id number] AM .  Spangler placed    Subjective: Pt seen and examined at bedside.Awakens to voice.  Tolerating tube feeds.  Voices her name when asked, squeezes hands on command     MEDICATIONS  (STANDING):  albumin human 25% IVPB 100 milliLiter(s) IV Intermittent once  ascorbic acid 500 milliGRAM(s) Oral daily  chlorhexidine 2% Cloths 1 Application(s) Topical daily  Dakins Solution - 1/4 Strength 1 Application(s) Topical every 8 hours  enoxaparin Injectable 40 milliGRAM(s) SubCutaneous every 24 hours  folic acid 1 milliGRAM(s) Oral daily  lactated ringers. 500 milliLiter(s) (500 mL/Hr) IV Continuous <Continuous>  levothyroxine 112 MICROGram(s) Enteral Tube daily  multivitamin 1 Tablet(s) Oral daily  pantoprazole   Suspension 40 milliGRAM(s) Oral daily  polyethylene glycol 3350 17 Gram(s) Oral daily  povidone iodine 10% Solution 1 Application(s) Topical daily  senna 2 Tablet(s) Oral at bedtime  sertraline 25 milliGRAM(s) Oral daily  tamoxifen 20 milliGRAM(s) Oral <User Schedule>  thiamine 100 milliGRAM(s) Oral daily    MEDICATIONS  (PRN):      Objective:    Vitals: Vital Signs Last 24 Hrs  T(C): 36.4 (24 @ 05:14), Max: 36.6 (24 @ 03:50)  T(F): 97.6 (24 @ 05:14), Max: 97.8 (24 @ 03:50)  HR: 71 (24 @ 09:30) (71 - 80)  BP: 93/62 (24 @ 09:30) (93/62 - 102/63)  BP(mean): --  RR: 18 (24 @ 09:30) (18 - 18)  SpO2: 98% (24 @ 09:30) (98% - 100%)                I&O's Summary    2024 07:01  -  2024 07:00  --------------------------------------------------------  IN: 0 mL / OUT: 600 mL / NET: -600 mL    2024 07:01  -  2024 10:26  --------------------------------------------------------  IN: 0 mL / OUT: 700 mL / NET: -700 mL        PHYSICAL EXAM:  GENERAL: NAD, cachetic, chronically ill appearing   HEAD:  Atraumatic, Normocephalic  EYES: PERRL, conjunctiva and sclera clear  ENMT: MMM, poor oral hygiene  NECK: Supple  CHEST/LUNG: Clear to auscultation bilaterally; No rales, rhonchi, wheezing, or rubs but difficult to assess due to contracted habitus  HEART: Regular rate and rhythm; No murmurs, rubs, or gallops  ABDOMEN: Soft, Nontender, Nondistended; Bowel sounds present  EXTREMITIES:  no clubbing, cyanosis, or edema   SKIN: No rashes or lesions  NERVOUS SYSTEM:  Alert, opens eyes to voice, contracted extremities, voices her name when asked, squeezes hands on command   PSYCH: Unable to assess    LABS:          CAPILLARY BLOOD GLUCOSE      POCT Blood Glucose.: 109 mg/dL (2024 05:49)  POCT Blood Glucose.: 127 mg/dL (2024 21:38)  POCT Blood Glucose.: 113 mg/dL (2024 17:17)  POCT Blood Glucose.: 127 mg/dL (2024 12:18)        Urinalysis Basic - ( 2024 09:50 )    Color: Yellow / Appearance: Clear / S.008 / pH: x  Gluc: x / Ketone: Negative mg/dL  / Bili: Negative / Urobili: 1.0 mg/dL   Blood: x / Protein: Negative mg/dL / Nitrite: Negative   Leuk Esterase: Negative / RBC: 0 /HPF / WBC 0 /HPF   Sq Epi: x / Non Sq Epi: 0 /HPF / Bacteria: Negative /HPF          RADIOLOGY & ADDITIONAL TESTS:    Imaging Personally Reviewed:  [x ] YES  [ ] NO    Consultants involved in case:   Consultant(s) Notes Reviewed:  [ x] YES  [ ] NO:   Care Discussed with Consultants/Other Providers [x ] YES  [ ] NO

## 2024-01-18 NOTE — PROGRESS NOTE ADULT - PROBLEM SELECTOR PLAN 7
- DVT ppx: Lovenox  - GI ppx: pantoprazole  - Diet: PEG  - Dispo: pending further GOC discussions Hx of Grave's disease s/p radioactive iodine  - resumed home levothyroxine in PEG  formulation

## 2024-01-18 NOTE — PROGRESS NOTE ADULT - NUTRITIONAL ASSESSMENT
This patient has been assessed with a concern for Malnutrition and has been determined to have a diagnosis/diagnoses of Severe protein-calorie malnutrition and Underweight (BMI < 19).    This patient is being managed with:   Diet NPO with Tube Feed-  Tube Feeding Modality: Gastrostomy  Glucerna 1.2 Vidal (GLUCERNARTH)  Continuous  Starting Tube Feed Rate {mL per Hour}: 50  Increase Tube Feed Rate by (mL): 0     Every 4 hours  Until Goal Tube Feed Rate (mL per Hour): 50  Tube Feed Duration (in Hours): 24  Tube Feed Start Time: 16:20  Carlos(7 Gm Arginine/7 Gm Glut/1.2 Gm HMB     Qty per Day:  2  Entered: Jan 17 2024 12:29PM

## 2024-01-18 NOTE — PROGRESS NOTE ADULT - ATTENDING SUPERVISION STATEMENT
Fellow
Fellow
Resident

## 2024-01-18 NOTE — PROGRESS NOTE ADULT - ATTENDING COMMENTS
SBP is stable after Albumin as pt has low albumin.  Neg urinalysis and avila was placed due to urinary retention.  Patient will be dc to [facility] and I have spoken with the Rehab physician, Dr Mathew to give him a sign out about patient.  Patient is to f/up oupt with Uro for Trial of void .   was updated .  DC time 45mns      Yesenia Brown   HOspitalist   available on TEAMS

## 2024-01-18 NOTE — PROGRESS NOTE ADULT - PROBLEM SELECTOR PLAN 5
Hx of breast cancer, s/p L mastectomy  - Holding home Tamoxifen --> RESTART Patient was unable to urinate overnight 1/17, with 700cc urine out via straight cath.  Spnagler placed and will be discharged with Spangler.  It is possible that this was contributing to the decreased blood pressures during the day yesterday.  - Follow up with urology outpatient in 1-2 weeks

## 2024-01-18 NOTE — PROGRESS NOTE ADULT - PROBLEM SELECTOR PLAN 6
Hx of Grave's disease s/p radioactive iodine  - resumed home levothyroxine in PEG  formulation Hx of breast cancer, s/p L mastectomy  - Holding home Tamoxifen --> RESTART Hx of breast cancer, s/p L mastectomy  - Holding home Tamoxifen

## 2024-01-18 NOTE — PROGRESS NOTE ADULT - SUBJECTIVE AND OBJECTIVE BOX
[facility]-- WOUND TEAM -- FOLLOW UP NOTE  --------------------------------------------------------------------------------    24 hour events/subjective:      afebrile  tolerating tf  incontinent  dc planning for home    Diet: NPO with Tube Feed-  Tube Feeding Modality: Gastrostomy  Glucerna 1.2 Vidal (GLUCERNAR)  Continuous  Starting Tube Feed Rate {mL per Hour}: 50  Increase Tube Feed Rate by (mL): 0     Every 4 hours  Until Goal Tube Feed Rate (mL per Hour): 50  Tube Feed Duration (in Hours): 24  Tube Feed Start Time: 16:20  Carlos(7 Gm Arginine/7 Gm Glut/1.2 Gm HMB     Qty per Day:  2  Entered: Jan 17 2024 12:29PM    ROS: pt unable to offer    ALLERGIES & MEDICATIONS  --------------------------------------------------------------------------------  Allergies  Tapazole (Hives)        STANDING INPATIENT MEDICATIONS  ascorbic acid 500 milliGRAM(s) Oral daily  chlorhexidine 2% Cloths 1 Application(s) Topical daily  Dakins Solution - 1/4 Strength 1 Application(s) Topical every 8 hours  enoxaparin Injectable 40 milliGRAM(s) SubCutaneous every 24 hours  folic acid 1 milliGRAM(s) Oral daily  lactated ringers. 500 milliLiter(s) IV Continuous <Continuous>  levothyroxine 112 MICROGram(s) Enteral Tube daily  multivitamin 1 Tablet(s) Oral daily  pantoprazole   Suspension 40 milliGRAM(s) Oral daily  polyethylene glycol 3350 17 Gram(s) Oral daily  povidone iodine 10% Solution 1 Application(s) Topical daily  senna 2 Tablet(s) Oral at bedtime  sertraline 25 milliGRAM(s) Oral daily  thiamine 100 milliGRAM(s) Oral daily          VITALS/PHYSICAL EXAM  --------------------------------------------------------------------------------  T(C): 36.7 (01-18-24 @ 14:32), Max: 36.7 (01-18-24 @ 11:49)  HR: 90 (01-18-24 @ 14:32) (71 - 90)  BP: 113/65 (01-18-24 @ 14:32) (93/62 - 113/65)  RR: 18 (01-18-24 @ 14:32) (18 - 18)  SpO2: 100% (01-18-24 @ 14:32) (98% - 100%)  Wt(kg): --      NAD, lethargic, cachectic, frail  WD/ WN/  WG  Versa Care P500  bed  HEENT:  NC/AT, sclera clear, mucosa moist, throat clear, trachea midline, neck supple  Respiratory: nonlabored w/ equal chest rise  Gastrointestinal: soft NT/ND   Neurology:  nonverbal, no follow commands, paraplegic  Psych: calm/ appropriate  Musculoskeletal: stiff pROM, w/ contractures  Vascular: BLE equally warn,  no cyanosis, clubbing, nor acute ischemia         BLE edema equal         BLE DP pulses palpable  Skin:  thin, dry, pale, frail,  ecchymosis w/o hematoma  lower buttocks w/ hyperpigmentation of incontinence   left foot DTI (referred to podiatry)  left trochanter (HIP) unstageable pressure injury  slough  0.5 cm x 1.5 cm  x 0.1cm  left posterior shoulder hypo/ hyperpigmented skin    resolving pressure injury   Right (hip)  stage 4 pressure injury  with 2 wounds closely abutting -   more anterior wound w/  slough and granular tissue w/ undermining        adjacent area of partial thickness skin loss w/ slough       10cm x 8cm x 1.5cm w/ undermining greatest at 9:00 of 4cm        serosanguinous dc  Right upper buttock unstageable pressure  injury   granular and slough tissue   5 cm  x 4 cm X 1cm   lifting soft grey eschar    serosanguinous discharge  sacrum stage 4 pressure injury    w/ lsoft grey/black eschar w/ hyperpigmented periwound skin    8cm x 6cm x 1cm.     serosanguinous drainage  thoracic spine unstageable pressure injury    dried black eschar     10cm x 3cm  no new blistering  or drainage  No odor, erythema, increased warmth, tenderness, induration, fluctuance, nor crepitus          LABS/ CULTURES/ RADIOLOGY:    CAPILLARY BLOOD GLUCOSE  POCT Blood Glucose.: 152 mg/dL (18 Jan 2024 12:06)  POCT Blood Glucose.: 109 mg/dL (18 Jan 2024 05:49)  POCT Blood Glucose.: 127 mg/dL (17 Jan 2024 21:38)  POCT Blood Glucose.: 113 mg/dL (17 Jan 2024 17:17)      A1C with Estimated Average Glucose Result: 5.7 % (09-19-23 @ 10:33)

## 2024-01-19 LAB
CULTURE RESULTS: SIGNIFICANT CHANGE UP
SPECIMEN SOURCE: SIGNIFICANT CHANGE UP

## 2024-01-19 NOTE — CHART NOTE - NSCHARTNOTESELECT_GEN_ALL_CORE
GOC/Event Note
Nutrition Services
Palliative Medicine/Event Note
Speech & Swallow
DC NOTE/Event Note
Event Note
Nutrition Services
Nutrition Services
Speech & Swallow
gastroenterology

## 2024-01-29 NOTE — PROGRESS NOTE ADULT - ASSESSMENT
66 yo woman with PMHx breast cancer s/p mastectomy, HTN, Hypothyroidism admitted to [facility] after hospital course at [facility] after a fall from a flight of stairs, sustaining SAH, left-sided rib fractures, left iliac wing fracture with hematoma, left forehead laceration, s/p successful closed reduction of left pinky PIP dislocation, Acute L3 compression fracture, 4/29, admitted for rehab- pt/ot/dvt ppx pain meds    s/p fall SAH left sided rib and left iliac wing fx, L3fx  - continue comprehensive rehabilitation program per PMR  - pain meds per PMR team    Urinary retention  Constipation  - encouraged dulcolax after therapies today  - voiding likely to be easier if moves bowels regularly  - straight cath/ PVR per protocol    Normocytic Anemia with folic acid deficiency - improving  - likley blood loss anemia s/p traumatic Fall  - monitor H/H  - C/w folic acid 1mg po daily    Elevated ALKPHOS  - possibly related to multiple fractures and GGT WNL  - avoid hepatotoxins  - trend LFTs  - encourage hydration    Episodes of SVT   - Continue with toprol XL   - Can follow up in 3 months with EP Dr. Hernández [phone]. (elective ablation)    Hypothyroidism  - Continue Synthroid     Acute adjustment disorder with depressed mood    -  continue modafinil for cognitive stimulation    Insomnia  - melatonin     Acute Hypoxic Respiratory Failure likely due to Acute B/L rib fractures, and small pneumothorax  - Incentive spirometry.   - Supplemental Oxygen as needed for O2 goal > 92%    VTE PPX   - Lovenox    Attestation: I have personally interviewed and examined this patient, reviewed pertinent clinical information, and performed the evaluation and management services provided at today's visit for inpatient medical follow up    I am available to discuss any issues related to the medical care of this patient on the unit, or by phone at [phone]   [Nutrition/ Exercise/ Weight Management] : nutrition, exercise, weight management [Vitamins/Supplements] : vitamins/supplements [Breast Self Exam] : breast self exam

## 2024-01-30 ENCOUNTER — INPATIENT (INPATIENT)
Facility: HOSPITAL | Age: 69
LOS: 20 days | Discharge: NURSING, REHAB OR LONG-TERM CARE | DRG: 853 | End: 2024-02-20
Attending: STUDENT IN AN ORGANIZED HEALTH CARE EDUCATION/TRAINING PROGRAM | Admitting: STUDENT IN AN ORGANIZED HEALTH CARE EDUCATION/TRAINING PROGRAM
Payer: MEDICARE

## 2024-01-30 VITALS — HEART RATE: 110 BPM | WEIGHT: 80.03 LBS | TEMPERATURE: 103 F | HEIGHT: 63 IN | RESPIRATION RATE: 30 BRPM

## 2024-01-30 DIAGNOSIS — A41.9 SEPSIS, UNSPECIFIED ORGANISM: ICD-10-CM

## 2024-01-30 DIAGNOSIS — Z90.12 ACQUIRED ABSENCE OF LEFT BREAST AND NIPPLE: Chronic | ICD-10-CM

## 2024-01-30 LAB
ALBUMIN SERPL ELPH-MCNC: 2.9 G/DL — LOW (ref 3.3–5)
ALP SERPL-CCNC: 73 U/L — SIGNIFICANT CHANGE UP (ref 40–120)
ALT FLD-CCNC: 105 U/L — HIGH (ref 10–45)
ANION GAP SERPL CALC-SCNC: 11 MMOL/L — SIGNIFICANT CHANGE UP (ref 5–17)
ANISOCYTOSIS BLD QL: SLIGHT — SIGNIFICANT CHANGE UP
APPEARANCE UR: CLEAR — SIGNIFICANT CHANGE UP
APTT BLD: 29.6 SEC — SIGNIFICANT CHANGE UP (ref 24.5–35.6)
AST SERPL-CCNC: 105 U/L — HIGH (ref 10–40)
BACTERIA # UR AUTO: NEGATIVE /HPF — SIGNIFICANT CHANGE UP
BASE EXCESS BLDV CALC-SCNC: 6.2 MMOL/L — HIGH (ref -2–3)
BASOPHILS # BLD AUTO: 0 K/UL — SIGNIFICANT CHANGE UP (ref 0–0.2)
BASOPHILS NFR BLD AUTO: 0 % — SIGNIFICANT CHANGE UP (ref 0–2)
BILIRUB SERPL-MCNC: 0.7 MG/DL — SIGNIFICANT CHANGE UP (ref 0.2–1.2)
BILIRUB UR-MCNC: NEGATIVE — SIGNIFICANT CHANGE UP
BUN SERPL-MCNC: 49 MG/DL — HIGH (ref 7–23)
CA-I SERPL-SCNC: 1.01 MMOL/L — LOW (ref 1.15–1.33)
CALCIUM SERPL-MCNC: 7.5 MG/DL — LOW (ref 8.4–10.5)
CAST: 1 /LPF — SIGNIFICANT CHANGE UP (ref 0–4)
CHLORIDE BLDV-SCNC: 113 MMOL/L — HIGH (ref 96–108)
CHLORIDE SERPL-SCNC: 114 MMOL/L — HIGH (ref 96–108)
CO2 BLDV-SCNC: 33 MMOL/L — HIGH (ref 22–26)
CO2 SERPL-SCNC: 26 MMOL/L — SIGNIFICANT CHANGE UP (ref 22–31)
COLOR SPEC: SIGNIFICANT CHANGE UP
CREAT SERPL-MCNC: 0.65 MG/DL — SIGNIFICANT CHANGE UP (ref 0.5–1.3)
DIFF PNL FLD: NEGATIVE — SIGNIFICANT CHANGE UP
EGFR: 95 ML/MIN/1.73M2 — SIGNIFICANT CHANGE UP
ELLIPTOCYTES BLD QL SMEAR: SLIGHT — SIGNIFICANT CHANGE UP
EOSINOPHIL # BLD AUTO: 0 K/UL — SIGNIFICANT CHANGE UP (ref 0–0.5)
EOSINOPHIL NFR BLD AUTO: 0 % — SIGNIFICANT CHANGE UP (ref 0–6)
FLUAV AG NPH QL: SIGNIFICANT CHANGE UP
FLUBV AG NPH QL: SIGNIFICANT CHANGE UP
GAS PNL BLDV: 148 MMOL/L — HIGH (ref 136–145)
GAS PNL BLDV: SIGNIFICANT CHANGE UP
GLUCOSE BLDV-MCNC: 177 MG/DL — HIGH (ref 70–99)
GLUCOSE SERPL-MCNC: 189 MG/DL — HIGH (ref 70–99)
GLUCOSE UR QL: NEGATIVE MG/DL — SIGNIFICANT CHANGE UP
HCO3 BLDV-SCNC: 31 MMOL/L — HIGH (ref 22–29)
HCT VFR BLD CALC: 36.7 % — SIGNIFICANT CHANGE UP (ref 34.5–45)
HCT VFR BLDA CALC: 33 % — LOW (ref 34.5–46.5)
HGB BLD CALC-MCNC: 11.1 G/DL — LOW (ref 11.7–16.1)
HGB BLD-MCNC: 10.5 G/DL — LOW (ref 11.5–15.5)
INR BLD: 1.72 RATIO — HIGH (ref 0.85–1.18)
KETONES UR-MCNC: NEGATIVE MG/DL — SIGNIFICANT CHANGE UP
LACTATE BLDV-MCNC: 2.2 MMOL/L — HIGH (ref 0.5–2)
LEUKOCYTE ESTERASE UR-ACNC: ABNORMAL
LYMPHOCYTES # BLD AUTO: 1.48 K/UL — SIGNIFICANT CHANGE UP (ref 1–3.3)
LYMPHOCYTES # BLD AUTO: 10 % — LOW (ref 13–44)
LYMPHOCYTES # SPEC AUTO: 3 % — HIGH (ref 0–0)
MACROCYTES BLD QL: SLIGHT — SIGNIFICANT CHANGE UP
MANUAL SMEAR VERIFICATION: SIGNIFICANT CHANGE UP
MCHC RBC-ENTMCNC: 26.3 PG — LOW (ref 27–34)
MCHC RBC-ENTMCNC: 28.6 GM/DL — LOW (ref 32–36)
MCV RBC AUTO: 91.8 FL — SIGNIFICANT CHANGE UP (ref 80–100)
METAMYELOCYTES # FLD: 1 % — HIGH (ref 0–0)
MONOCYTES # BLD AUTO: 0.89 K/UL — SIGNIFICANT CHANGE UP (ref 0–0.9)
MONOCYTES NFR BLD AUTO: 6 % — SIGNIFICANT CHANGE UP (ref 2–14)
NEUTROPHILS # BLD AUTO: 11.8 K/UL — HIGH (ref 1.8–7.4)
NEUTROPHILS NFR BLD AUTO: 78 % — HIGH (ref 43–77)
NEUTS BAND # BLD: 2 % — SIGNIFICANT CHANGE UP (ref 0–8)
NITRITE UR-MCNC: NEGATIVE — SIGNIFICANT CHANGE UP
NRBC # BLD: 0 /100 WBCS — SIGNIFICANT CHANGE UP (ref 0–0)
OVALOCYTES BLD QL SMEAR: SLIGHT — SIGNIFICANT CHANGE UP
PCO2 BLDV: 46 MMHG — HIGH (ref 39–42)
PH BLDV: 7.44 — HIGH (ref 7.32–7.43)
PH UR: 5 — SIGNIFICANT CHANGE UP (ref 5–8)
PLAT MORPH BLD: NORMAL — SIGNIFICANT CHANGE UP
PLATELET # BLD AUTO: 197 K/UL — SIGNIFICANT CHANGE UP (ref 150–400)
PO2 BLDV: 35 MMHG — SIGNIFICANT CHANGE UP (ref 25–45)
POIKILOCYTOSIS BLD QL AUTO: SLIGHT — SIGNIFICANT CHANGE UP
POLYCHROMASIA BLD QL SMEAR: SLIGHT — SIGNIFICANT CHANGE UP
POTASSIUM BLDV-SCNC: 4.8 MMOL/L — SIGNIFICANT CHANGE UP (ref 3.5–5.1)
POTASSIUM SERPL-MCNC: 4.4 MMOL/L — SIGNIFICANT CHANGE UP (ref 3.5–5.3)
POTASSIUM SERPL-SCNC: 4.4 MMOL/L — SIGNIFICANT CHANGE UP (ref 3.5–5.3)
PROT SERPL-MCNC: 6.6 G/DL — SIGNIFICANT CHANGE UP (ref 6–8.3)
PROT UR-MCNC: 30 MG/DL
PROTHROM AB SERPL-ACNC: 17.8 SEC — HIGH (ref 9.5–13)
RBC # BLD: 4 M/UL — SIGNIFICANT CHANGE UP (ref 3.8–5.2)
RBC # FLD: 17.9 % — HIGH (ref 10.3–14.5)
RBC BLD AUTO: ABNORMAL
RBC CASTS # UR COMP ASSIST: 3 /HPF — SIGNIFICANT CHANGE UP (ref 0–4)
REVIEW: SIGNIFICANT CHANGE UP
RSV RNA NPH QL NAA+NON-PROBE: SIGNIFICANT CHANGE UP
SAO2 % BLDV: 54.4 % — LOW (ref 67–88)
SARS-COV-2 RNA SPEC QL NAA+PROBE: SIGNIFICANT CHANGE UP
SODIUM SERPL-SCNC: 151 MMOL/L — HIGH (ref 135–145)
SP GR SPEC: >1.03 — HIGH (ref 1–1.03)
SQUAMOUS # UR AUTO: 2 /HPF — SIGNIFICANT CHANGE UP (ref 0–5)
UROBILINOGEN FLD QL: 1 MG/DL — SIGNIFICANT CHANGE UP (ref 0.2–1)
WBC # BLD: 14.75 K/UL — HIGH (ref 3.8–10.5)
WBC # FLD AUTO: 14.75 K/UL — HIGH (ref 3.8–10.5)
WBC UR QL: 17 /HPF — HIGH (ref 0–5)

## 2024-01-30 PROCEDURE — 71045 X-RAY EXAM CHEST 1 VIEW: CPT | Mod: 26

## 2024-01-30 PROCEDURE — 99223 1ST HOSP IP/OBS HIGH 75: CPT

## 2024-01-30 PROCEDURE — 72129 CT CHEST SPINE W/DYE: CPT | Mod: 26,MA

## 2024-01-30 PROCEDURE — 85060 BLOOD SMEAR INTERPRETATION: CPT

## 2024-01-30 PROCEDURE — 71275 CT ANGIOGRAPHY CHEST: CPT | Mod: 26,MA

## 2024-01-30 PROCEDURE — 99285 EMERGENCY DEPT VISIT HI MDM: CPT

## 2024-01-30 PROCEDURE — 74177 CT ABD & PELVIS W/CONTRAST: CPT | Mod: 26,MA

## 2024-01-30 PROCEDURE — 72132 CT LUMBAR SPINE W/DYE: CPT | Mod: 26,MA

## 2024-01-30 RX ORDER — MIDODRINE HYDROCHLORIDE 2.5 MG/1
20 TABLET ORAL ONCE
Refills: 0 | Status: COMPLETED | OUTPATIENT
Start: 2024-01-30 | End: 2024-01-30

## 2024-01-30 RX ORDER — SODIUM CHLORIDE 9 MG/ML
1000 INJECTION INTRAMUSCULAR; INTRAVENOUS; SUBCUTANEOUS ONCE
Refills: 0 | Status: COMPLETED | OUTPATIENT
Start: 2024-01-30 | End: 2024-01-30

## 2024-01-30 RX ORDER — VANCOMYCIN HCL 1 G
750 VIAL (EA) INTRAVENOUS ONCE
Refills: 0 | Status: COMPLETED | OUTPATIENT
Start: 2024-01-30 | End: 2024-01-30

## 2024-01-30 RX ORDER — ACETAMINOPHEN 500 MG
650 TABLET ORAL EVERY 6 HOURS
Refills: 0 | Status: DISCONTINUED | OUTPATIENT
Start: 2024-01-30 | End: 2024-02-02

## 2024-01-30 RX ORDER — ACETAMINOPHEN 500 MG
550 TABLET ORAL ONCE
Refills: 0 | Status: COMPLETED | OUTPATIENT
Start: 2024-01-30 | End: 2024-01-30

## 2024-01-30 RX ORDER — LANOLIN ALCOHOL/MO/W.PET/CERES
3 CREAM (GRAM) TOPICAL AT BEDTIME
Refills: 0 | Status: DISCONTINUED | OUTPATIENT
Start: 2024-01-30 | End: 2024-02-02

## 2024-01-30 RX ORDER — SODIUM CHLORIDE 9 MG/ML
2000 INJECTION INTRAMUSCULAR; INTRAVENOUS; SUBCUTANEOUS ONCE
Refills: 0 | Status: COMPLETED | OUTPATIENT
Start: 2024-01-30 | End: 2024-01-30

## 2024-01-30 RX ORDER — PIPERACILLIN AND TAZOBACTAM 4; .5 G/20ML; G/20ML
3.38 INJECTION, POWDER, LYOPHILIZED, FOR SOLUTION INTRAVENOUS ONCE
Refills: 0 | Status: COMPLETED | OUTPATIENT
Start: 2024-01-30 | End: 2024-01-30

## 2024-01-30 RX ORDER — HYDROCORTISONE 20 MG
50 TABLET ORAL ONCE
Refills: 0 | Status: COMPLETED | OUTPATIENT
Start: 2024-01-30 | End: 2024-01-31

## 2024-01-30 RX ADMIN — SODIUM CHLORIDE 1000 MILLILITER(S): 9 INJECTION INTRAMUSCULAR; INTRAVENOUS; SUBCUTANEOUS at 23:15

## 2024-01-30 RX ADMIN — SODIUM CHLORIDE 2000 MILLILITER(S): 9 INJECTION INTRAMUSCULAR; INTRAVENOUS; SUBCUTANEOUS at 17:01

## 2024-01-30 RX ADMIN — Medication 550 MILLIGRAM(S): at 17:32

## 2024-01-30 RX ADMIN — PIPERACILLIN AND TAZOBACTAM 200 GRAM(S): 4; .5 INJECTION, POWDER, LYOPHILIZED, FOR SOLUTION INTRAVENOUS at 17:02

## 2024-01-30 RX ADMIN — Medication 250 MILLIGRAM(S): at 18:00

## 2024-01-30 RX ADMIN — Medication 220 MILLIGRAM(S): at 17:02

## 2024-01-30 NOTE — H&P ADULT - ASSESSMENT
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 admit for sepsis likely 2/2 to wound infection/osteomyelitis.

## 2024-01-30 NOTE — H&P ADULT - HISTORY OF PRESENT ILLNESS
Pt non-verbal at baseline. Hx obtained via chart review and .    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 Found to have leukocytosis, tachycardia and hypotension here as well. Difficult to assess, contracted and non-verbal. Pt non-verbal at baseline. Hx obtained via chart review and .    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 Found to have leukocytosis, tachycardia and hypotension here as well. Difficult to assess, contracted and non-verbal.    Spangler cathter placed in ED, POA.    Of note, patient was recently discharged after hospitalization for UTI and hypernatremia complicated by AHRF 2/2 to aspiration pneumonia. She is s/p PEG placement on 1/11/24.

## 2024-01-30 NOTE — ED PROVIDER NOTE - ATTENDING CONTRIBUTION TO CARE
Patient is a 68-year-old female with a history of breast cancer s/p L mastectomy, TBI (s/p a fall ~2 years ago) c/b aphasia, meningioma, hypothyroidism (hx Grave's dz s/p radioactive iodine), CVA,  s/p PEG tube, sent in from NH for evaluation of fever and hypoxia.  Per EMS, patient was noted to be hypoxic to 87%.  She is not on oxygen at baseline.  They state that has been had stated that patient felt warm yesterday.  EMS states that nursing home started Vanco and Zosyn but patient had not yet received any.  Patient is nonverbal and aphasic, bedbound.  She recently had an admission on 12/20/2023  for decreased p.o. intake and lethargy, found to be hyponatremic with a UTI, s/p PEG tube.      VS noted  Gen.  chronically ill, cachectic, nonverbal, contracted  HEENT: EOMI,  dry mm  Lungs: CTAB/L no C/ W /R   CVS:  tachycardic  Spine: Thoracic and lumbar spine with pressure ulcers present, there is a 4 cm sacral ulcer present that appears to be clean and dry, there is a 3-4 cm  ulcer on the paralumbar spine on the right that is also clean and dry, there is an ulcer on the left hip, 3 cm ulcer with pus present on the right hip  Abd; Soft non tender, non distended,  PEG tube in place, Spangler in place however there is no urine present  Ext: no edema  Skin: no rash  Neuro  awake, nonverbal  a/p:  fever, hypoxia–patient is bedbound and chronically ill.    Plan for sepsis labs, vancomycin, Zosyn. Differential includes aspiration pneumonia, viral syndrome, PE.  Will get CTA chest to further evaluate.  Multiple ulcers present, will get CT abdomen pelvis with recon of T and L-spine for further evaluation. will change Spangler.  Concern for metabolic derangement, UTI.  - Gabbie ANDRES

## 2024-01-30 NOTE — H&P ADULT - PROBLEM SELECTOR PLAN 1
Sepsis 2/2 osteomyelitis Sepsis 2/2 osteomyelitis  - Pt received Vanc/Zosyn in ED and is septic so will continue empiric abx prior to bone biopsy in setting of hemodynamic instability  - F/u blood cx  - Please consult ID in AM  - Wound care consult  - Palliative consult, per  pt is FULL CODE  - Given 3L of NS, Hydrocortisone 50mg IVx1 and midodrine 20mg x1  - In setting of hypernatremia, will give 0.45% Saline @125

## 2024-01-30 NOTE — ED ADULT NURSE NOTE - OBJECTIVE STATEMENT
68 Y F awake and non-verbal at baseline PMH TBI/cerebral infarction, hypothyroidism, left breast cancer arrived via EMS from [facility] c/o lethargy and fevers x 2 days. EMS reports "nursing home nurse told pt was dry and no urine output from avila." Upon arrival to the ED, the pt has even, bilateral and unlabored chest rise, airway patent, and bed bound. Upon assessment, pt is febrile, has even and bilateral peripheral pulses, limited ROM due to muscular contraction, PERRLA, and soft, non-tender, non-distended abdomen. PEG tube noted to L lower abdominal quadrant. Stage 3 pressure ulcer noted midline spine, 2 unstable pressure ulcers noted in sacrum, 1 stage 1 pressure ulcer noted to L hip, 1 stage 4 pressure ulcer noted to L hip. Stage 1 pressure ulcer noted to R shoulder, and 2 nonblanchable redness noted to bilateral ears. RN placed allovan dressings to all pressure ulcers. IV access obtained, bed in lowest position, side rails up. Pt's  at bedside.

## 2024-01-30 NOTE — ED PROVIDER NOTE - CLINICAL SUMMARY MEDICAL DECISION MAKING FREE TEXT BOX
67 y/o female w/ PMH TBI/cerebral infarction non-verbal at baseline, sacral ulcer, MDD, hypothyroidism, left breast cancer from [facility] c/o 2 day history of fever and decreased responsiveness. Pt is not participating in ROS. Sinus tachycardic, mildly hypotensive, febrile, concern for septic shock. Will draw sepsis labs, CXR, fluids/abxs and admit for sepsis.

## 2024-01-30 NOTE — ED ADULT NURSE REASSESSMENT NOTE - NS ED NURSE REASSESS COMMENT FT1
RN placed intermittent avila for urine using sterile technique. Second RN present to confirm sterility. Explained procedure as it was being done. Pt tolerated procedure well. Sterile specimens collected and sent to lab as ordered. drained aprox 50ml of clear yellow urine. Comfort and safety provided.

## 2024-01-30 NOTE — ED ADULT NURSE NOTE - COVID-19 RESULT DATE/TIME
15-Angel-2024 01:32 He was seen and evaluated in the emergency department for your palpitations.  Your results did not show any evidence of cardiac damage, metabolic abnormality, or thyroid dysfunction.  Your chest x-ray was negative for any pulmonary disease.  You are mildly anemic to hemoglobin of 10.  Please follow-up with your primary care physician to discuss your results here today.    Your results have been printed in the following pages.    Please return to the emergency department for any new or concerning symptoms including but not limited to chest pain, fever, shortness of breath, palpitations, headache, dizziness, blurry vision.    Please follow-up with your cardiologist at your scheduled appointment.

## 2024-01-30 NOTE — H&P ADULT - PROBLEM SELECTOR PLAN 4
- QTc of - QTc of 561  - Calcium gluconate 2mg IVx1 given  - Optimize K, Mg, Ca  - Repeat EKG in AM  - Avoid QT prolonging agents

## 2024-01-30 NOTE — ED PROVIDER NOTE - PHYSICAL EXAMINATION
GENERAL: NAD  HEENT:  Atraumatic  CHEST/LUNG: Chest rise equal bilaterally. Sinus tachycardic  HEART: Regular rate and rhythm  ABDOMEN: Soft, Nontender, Nondistended  EXTREMITIES:  Extremities warm, dry  PSYCH: A&Ox0  SKIN: Chronic ulcers on skin  NEUROLOGY: strength and sensation intact in all extremities

## 2024-01-30 NOTE — ED PROVIDER NOTE - OBJECTIVE STATEMENT
69 y/o female w/ PMH TBI/cerebral infarction non-verbal at baseline, sacral ulcer, MDD, hypothyroidism, left breast cancer from [facility] c/o 2 day history of fever and decreased responsiveness. Pt is not participating in ROS.

## 2024-01-30 NOTE — H&P ADULT - NSHPPHYSICALEXAM_GEN_ALL_CORE
Vital Signs Last 24 Hrs  T(C): 37 (30 Jan 2024 22:00), Max: 39.7 (30 Jan 2024 17:00)  T(F): 98.6 (30 Jan 2024 22:00), Max: 103.4 (30 Jan 2024 17:00)  HR: 96 (30 Jan 2024 23:23) (84 - 110)  BP: 97/65 (30 Jan 2024 23:23) (87/55 - 136/50)  BP(mean): 74 (30 Jan 2024 23:23) (61 - 114)  RR: 26 (30 Jan 2024 23:23) (19 - 34)  SpO2: 100% (30 Jan 2024 23:23) (94% - 100%)    Parameters below as of 30 Jan 2024 23:23  Patient On (Oxygen Delivery Method): nasal cannula  O2 Flow (L/min): 2      CONSTITUTIONAL: No apparent distress  EYES: PERRLA and symmetric, EOMI, No conjunctival or scleral injection, non-icteric  ENMT: Oral mucosa with moist membranes.  NECK: Supple, symmetric. No JVD.  RESP: No respiratory distress, no use of accessory muscles; CTA b/l, no WRR  CV: RRR, +S1S2, no MRG  GI: Soft, NT, ND, no rebound, no guarding  : No suprapubic tenderness. No CVA tenderness.  LYMPH: No cervical LAD or tenderness  MSK: Contracted UE b/l.  EXTREMITIES: No pedal edema  SKIN: Abrasion on neck. Multiple significant wounds on b/l LE and worst on Sacrum with exposed bone. All wounds POA.  NEURO: Alert, but not responsive. No tremor.

## 2024-01-30 NOTE — H&P ADULT - PROBLEM SELECTOR PLAN 3
- Na of 151, likely 2/2 to dehydration and malnutrition from PEG tube  - 0.45% Saline @125  - Free water flushed of 60cc q4h  - PEG tube placed 1/11  - Start tube feeds of jevity 1.2 @10 and increase to 50 total  - Nutrition consult

## 2024-01-30 NOTE — ED ADULT NURSE NOTE - NSFALLRISKINTERV_ED_ALL_ED

## 2024-01-31 DIAGNOSIS — Z51.5 ENCOUNTER FOR PALLIATIVE CARE: ICD-10-CM

## 2024-01-31 DIAGNOSIS — Z71.89 OTHER SPECIFIED COUNSELING: ICD-10-CM

## 2024-01-31 DIAGNOSIS — C50.919 MALIGNANT NEOPLASM OF UNSPECIFIED SITE OF UNSPECIFIED FEMALE BREAST: ICD-10-CM

## 2024-01-31 DIAGNOSIS — R53.2 FUNCTIONAL QUADRIPLEGIA: ICD-10-CM

## 2024-01-31 DIAGNOSIS — E87.0 HYPEROSMOLALITY AND HYPERNATREMIA: ICD-10-CM

## 2024-01-31 DIAGNOSIS — I95.9 HYPOTENSION, UNSPECIFIED: ICD-10-CM

## 2024-01-31 DIAGNOSIS — K59.00 CONSTIPATION, UNSPECIFIED: ICD-10-CM

## 2024-01-31 DIAGNOSIS — R79.89 OTHER SPECIFIED ABNORMAL FINDINGS OF BLOOD CHEMISTRY: ICD-10-CM

## 2024-01-31 DIAGNOSIS — R94.31 ABNORMAL ELECTROCARDIOGRAM [ECG] [EKG]: ICD-10-CM

## 2024-01-31 DIAGNOSIS — M46.28 OSTEOMYELITIS OF VERTEBRA, SACRAL AND SACROCOCCYGEAL REGION: ICD-10-CM

## 2024-01-31 DIAGNOSIS — E03.9 HYPOTHYROIDISM, UNSPECIFIED: ICD-10-CM

## 2024-01-31 LAB
A1C WITH ESTIMATED AVERAGE GLUCOSE RESULT: 5.8 % — HIGH (ref 4–5.6)
ALBUMIN SERPL ELPH-MCNC: 2.4 G/DL — LOW (ref 3.3–5)
ALP SERPL-CCNC: 49 U/L — SIGNIFICANT CHANGE UP (ref 40–120)
ALT FLD-CCNC: 60 U/L — HIGH (ref 10–45)
ANION GAP SERPL CALC-SCNC: 9 MMOL/L — SIGNIFICANT CHANGE UP (ref 5–17)
AST SERPL-CCNC: 37 U/L — SIGNIFICANT CHANGE UP (ref 10–40)
BILIRUB SERPL-MCNC: 0.6 MG/DL — SIGNIFICANT CHANGE UP (ref 0.2–1.2)
BUN SERPL-MCNC: 28 MG/DL — HIGH (ref 7–23)
CALCIUM SERPL-MCNC: 7.2 MG/DL — LOW (ref 8.4–10.5)
CHLORIDE SERPL-SCNC: 121 MMOL/L — HIGH (ref 96–108)
CHOLEST SERPL-MCNC: 66 MG/DL — SIGNIFICANT CHANGE UP
CO2 SERPL-SCNC: 23 MMOL/L — SIGNIFICANT CHANGE UP (ref 22–31)
CREAT SERPL-MCNC: 0.36 MG/DL — LOW (ref 0.5–1.3)
CRP SERPL-MCNC: 15 MG/L — HIGH (ref 0–4)
EGFR: 109 ML/MIN/1.73M2 — SIGNIFICANT CHANGE UP
ERYTHROCYTE [SEDIMENTATION RATE] IN BLOOD: 30 MM/HR — HIGH (ref 0–20)
ESTIMATED AVERAGE GLUCOSE: 120 MG/DL — HIGH (ref 68–114)
GLUCOSE BLDC GLUCOMTR-MCNC: 101 MG/DL — HIGH (ref 70–99)
GLUCOSE BLDC GLUCOMTR-MCNC: 103 MG/DL — HIGH (ref 70–99)
GLUCOSE SERPL-MCNC: 103 MG/DL — HIGH (ref 70–99)
GRAM STN FLD: ABNORMAL
GRAM STN FLD: ABNORMAL
HCT VFR BLD CALC: 32.8 % — LOW (ref 34.5–45)
HDLC SERPL-MCNC: 17 MG/DL — LOW
HGB BLD-MCNC: 9.2 G/DL — LOW (ref 11.5–15.5)
LIPID PNL WITH DIRECT LDL SERPL: 23 MG/DL — SIGNIFICANT CHANGE UP
MAGNESIUM SERPL-MCNC: 2 MG/DL — SIGNIFICANT CHANGE UP (ref 1.6–2.6)
MANUAL DIF COMMENT BLD-IMP: SIGNIFICANT CHANGE UP
MCHC RBC-ENTMCNC: 26.5 PG — LOW (ref 27–34)
MCHC RBC-ENTMCNC: 28 GM/DL — LOW (ref 32–36)
MCV RBC AUTO: 94.5 FL — SIGNIFICANT CHANGE UP (ref 80–100)
METHOD TYPE: SIGNIFICANT CHANGE UP
MSSA DNA SPEC QL NAA+PROBE: SIGNIFICANT CHANGE UP
NON HDL CHOLESTEROL: 49 MG/DL — SIGNIFICANT CHANGE UP
NRBC # BLD: 0 /100 WBCS — SIGNIFICANT CHANGE UP (ref 0–0)
PLATELET # BLD AUTO: 136 K/UL — LOW (ref 150–400)
POTASSIUM SERPL-MCNC: 3.5 MMOL/L — SIGNIFICANT CHANGE UP (ref 3.5–5.3)
POTASSIUM SERPL-SCNC: 3.5 MMOL/L — SIGNIFICANT CHANGE UP (ref 3.5–5.3)
PROT SERPL-MCNC: 5 G/DL — LOW (ref 6–8.3)
RBC # BLD: 3.47 M/UL — LOW (ref 3.8–5.2)
RBC # FLD: 17.8 % — HIGH (ref 10.3–14.5)
SODIUM SERPL-SCNC: 153 MMOL/L — HIGH (ref 135–145)
SPECIMEN SOURCE: SIGNIFICANT CHANGE UP
TRIGL SERPL-MCNC: 148 MG/DL — SIGNIFICANT CHANGE UP
TSH SERPL-MCNC: 1.77 UIU/ML — SIGNIFICANT CHANGE UP (ref 0.27–4.2)
WBC # BLD: 11.65 K/UL — HIGH (ref 3.8–10.5)
WBC # FLD AUTO: 11.65 K/UL — HIGH (ref 3.8–10.5)

## 2024-01-31 PROCEDURE — 99232 SBSQ HOSP IP/OBS MODERATE 35: CPT | Mod: GC

## 2024-01-31 PROCEDURE — 99223 1ST HOSP IP/OBS HIGH 75: CPT

## 2024-01-31 PROCEDURE — 99223 1ST HOSP IP/OBS HIGH 75: CPT | Mod: GC

## 2024-01-31 PROCEDURE — 99221 1ST HOSP IP/OBS SF/LOW 40: CPT

## 2024-01-31 PROCEDURE — 99233 SBSQ HOSP IP/OBS HIGH 50: CPT

## 2024-01-31 RX ORDER — SODIUM CHLORIDE 9 MG/ML
1000 INJECTION, SOLUTION INTRAVENOUS ONCE
Refills: 0 | Status: COMPLETED | OUTPATIENT
Start: 2024-01-31 | End: 2024-01-31

## 2024-01-31 RX ORDER — THIAMINE MONONITRATE (VIT B1) 100 MG
100 TABLET ORAL DAILY
Refills: 0 | Status: DISCONTINUED | OUTPATIENT
Start: 2024-01-31 | End: 2024-02-20

## 2024-01-31 RX ORDER — COLLAGENASE CLOSTRIDIUM HIST. 250 UNIT/G
1 OINTMENT (GRAM) TOPICAL
Refills: 0 | Status: DISCONTINUED | OUTPATIENT
Start: 2024-01-31 | End: 2024-02-10

## 2024-01-31 RX ORDER — SERTRALINE 25 MG/1
25 TABLET, FILM COATED ORAL DAILY
Refills: 0 | Status: DISCONTINUED | OUTPATIENT
Start: 2024-01-31 | End: 2024-02-20

## 2024-01-31 RX ORDER — SODIUM CHLORIDE 9 MG/ML
1000 INJECTION, SOLUTION INTRAVENOUS
Refills: 0 | Status: DISCONTINUED | OUTPATIENT
Start: 2024-01-31 | End: 2024-02-02

## 2024-01-31 RX ORDER — CALCIUM GLUCONATE 100 MG/ML
2 VIAL (ML) INTRAVENOUS ONCE
Refills: 0 | Status: COMPLETED | OUTPATIENT
Start: 2024-01-31 | End: 2024-01-31

## 2024-01-31 RX ORDER — HYDROCORTISONE 20 MG
50 TABLET ORAL EVERY 8 HOURS
Refills: 0 | Status: DISCONTINUED | OUTPATIENT
Start: 2024-01-31 | End: 2024-02-01

## 2024-01-31 RX ORDER — ACETAMINOPHEN 500 MG
550 TABLET ORAL ONCE
Refills: 0 | Status: COMPLETED | OUTPATIENT
Start: 2024-01-31 | End: 2024-01-31

## 2024-01-31 RX ORDER — MIDODRINE HYDROCHLORIDE 2.5 MG/1
10 TABLET ORAL EVERY 8 HOURS
Refills: 0 | Status: DISCONTINUED | OUTPATIENT
Start: 2024-01-31 | End: 2024-02-02

## 2024-01-31 RX ORDER — LEVOTHYROXINE SODIUM 125 MCG
112 TABLET ORAL DAILY
Refills: 0 | Status: DISCONTINUED | OUTPATIENT
Start: 2024-01-31 | End: 2024-01-31

## 2024-01-31 RX ORDER — POLYETHYLENE GLYCOL 3350 17 G/17G
17 POWDER, FOR SOLUTION ORAL DAILY
Refills: 0 | Status: DISCONTINUED | OUTPATIENT
Start: 2024-01-31 | End: 2024-02-20

## 2024-01-31 RX ORDER — VANCOMYCIN HCL 1 G
500 VIAL (EA) INTRAVENOUS EVERY 24 HOURS
Refills: 0 | Status: DISCONTINUED | OUTPATIENT
Start: 2024-01-31 | End: 2024-02-01

## 2024-01-31 RX ORDER — SENNA PLUS 8.6 MG/1
2 TABLET ORAL AT BEDTIME
Refills: 0 | Status: DISCONTINUED | OUTPATIENT
Start: 2024-01-31 | End: 2024-02-15

## 2024-01-31 RX ORDER — LEVOTHYROXINE SODIUM 125 MCG
80 TABLET ORAL AT BEDTIME
Refills: 0 | Status: DISCONTINUED | OUTPATIENT
Start: 2024-01-31 | End: 2024-02-20

## 2024-01-31 RX ORDER — ASCORBIC ACID 60 MG
500 TABLET,CHEWABLE ORAL DAILY
Refills: 0 | Status: DISCONTINUED | OUTPATIENT
Start: 2024-01-31 | End: 2024-02-20

## 2024-01-31 RX ORDER — FOLIC ACID 0.8 MG
1 TABLET ORAL DAILY
Refills: 0 | Status: DISCONTINUED | OUTPATIENT
Start: 2024-01-31 | End: 2024-02-20

## 2024-01-31 RX ORDER — TAMOXIFEN CITRATE 20 MG/1
20 TABLET, FILM COATED ORAL DAILY
Refills: 0 | Status: DISCONTINUED | OUTPATIENT
Start: 2024-01-31 | End: 2024-02-20

## 2024-01-31 RX ORDER — PIPERACILLIN AND TAZOBACTAM 4; .5 G/20ML; G/20ML
3.38 INJECTION, POWDER, LYOPHILIZED, FOR SOLUTION INTRAVENOUS EVERY 8 HOURS
Refills: 0 | Status: DISCONTINUED | OUTPATIENT
Start: 2024-01-31 | End: 2024-02-01

## 2024-01-31 RX ADMIN — MIDODRINE HYDROCHLORIDE 10 MILLIGRAM(S): 2.5 TABLET ORAL at 22:47

## 2024-01-31 RX ADMIN — MIDODRINE HYDROCHLORIDE 20 MILLIGRAM(S): 2.5 TABLET ORAL at 00:10

## 2024-01-31 RX ADMIN — Medication 550 MILLIGRAM(S): at 05:18

## 2024-01-31 RX ADMIN — Medication 50 MILLIGRAM(S): at 00:09

## 2024-01-31 RX ADMIN — Medication 1 MILLIGRAM(S): at 11:38

## 2024-01-31 RX ADMIN — Medication 50 MILLIGRAM(S): at 22:53

## 2024-01-31 RX ADMIN — SODIUM CHLORIDE 125 MILLILITER(S): 9 INJECTION, SOLUTION INTRAVENOUS at 17:33

## 2024-01-31 RX ADMIN — Medication 100 MILLIGRAM(S): at 18:35

## 2024-01-31 RX ADMIN — POLYETHYLENE GLYCOL 3350 17 GRAM(S): 17 POWDER, FOR SOLUTION ORAL at 11:38

## 2024-01-31 RX ADMIN — Medication 1 TABLET(S): at 11:37

## 2024-01-31 RX ADMIN — SODIUM CHLORIDE 125 MILLILITER(S): 9 INJECTION, SOLUTION INTRAVENOUS at 06:08

## 2024-01-31 RX ADMIN — MIDODRINE HYDROCHLORIDE 10 MILLIGRAM(S): 2.5 TABLET ORAL at 14:52

## 2024-01-31 RX ADMIN — PIPERACILLIN AND TAZOBACTAM 25 GRAM(S): 4; .5 INJECTION, POWDER, LYOPHILIZED, FOR SOLUTION INTRAVENOUS at 22:47

## 2024-01-31 RX ADMIN — Medication 500 MILLIGRAM(S): at 11:38

## 2024-01-31 RX ADMIN — Medication 100 MILLIGRAM(S): at 11:38

## 2024-01-31 RX ADMIN — PIPERACILLIN AND TAZOBACTAM 25 GRAM(S): 4; .5 INJECTION, POWDER, LYOPHILIZED, FOR SOLUTION INTRAVENOUS at 06:02

## 2024-01-31 RX ADMIN — Medication 220 MILLIGRAM(S): at 04:48

## 2024-01-31 RX ADMIN — PIPERACILLIN AND TAZOBACTAM 25 GRAM(S): 4; .5 INJECTION, POWDER, LYOPHILIZED, FOR SOLUTION INTRAVENOUS at 14:53

## 2024-01-31 RX ADMIN — SERTRALINE 25 MILLIGRAM(S): 25 TABLET, FILM COATED ORAL at 11:38

## 2024-01-31 RX ADMIN — Medication 1 APPLICATION(S): at 11:37

## 2024-01-31 RX ADMIN — SODIUM CHLORIDE 1000 MILLILITER(S): 9 INJECTION, SOLUTION INTRAVENOUS at 06:42

## 2024-01-31 RX ADMIN — Medication 1 APPLICATION(S): at 18:37

## 2024-01-31 RX ADMIN — TAMOXIFEN CITRATE 20 MILLIGRAM(S): 20 TABLET, FILM COATED ORAL at 11:37

## 2024-01-31 RX ADMIN — Medication 200 GRAM(S): at 03:36

## 2024-01-31 RX ADMIN — Medication 50 MILLIGRAM(S): at 14:52

## 2024-01-31 NOTE — ED ADULT NURSE REASSESSMENT NOTE - NS ED NURSE REASSESS COMMENT FT1
Report taken from BREANNA Choe. Pt introduced to oncoming RN. Pt awake, nonverbal at baseline and currently nonverbal. Febrile to 100.9 axillary, Provider Maribel Bliss contacted via teams regarding this and also inquiring about if synthroid should be given IV since the pill cannot be crushed and given thru peg tube. Pt is admitted awaiting inpatient bed assignment. 700ml of yellow urine emptied from avila bag. IV fluid bolus infusing well, no signs of infiltration noted. Bed locked and in lowest position. Denies any other needs or complaints at this time.

## 2024-01-31 NOTE — CONSULT NOTE ADULT - SUBJECTIVE AND OBJECTIVE BOX
Wound SURGERY CONSULT NOTE    HPI:  Pt non-verbal at baseline. Hx obtained via chart review and .    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 Found to have leukocytosis, tachycardia and hypotension here as well. Difficult to assess, contracted and non-verbal.    Spangler cathter placed in ED, POA.    Of note, patient was recently discharged after hospitalization for UTI and hypernatremia complicated by AHRF 2/2 to aspiration pneumonia. She is s/p PEG placement on 1/11/24.           Current Diet: Diet, NPO with Tube Feed:   Tube Feeding Modality: Gastrostomy  Jevity 1.2 Vidal (JEVITY1.2RTH)  Continuous  Starting Tube Feed Rate mL per Hour: 10  Increase Tube Feed Rate by (mL): 10     Every 4 hours  Until Goal Tube Feed Rate (mL per Hour): 50  Tube Feed Duration (in Hours): 24  Tube Feed Start Time: 07:30  Free Water Flush   Total Volume per Flush (mL): 60   Frequency: Every 4 Hours   Total Daily Volume of Flush (mL): 360 (01-31-24 @ 01:19)      PAST MEDICAL & SURGICAL HISTORY:              REVIEW OF SYSTEMS: Pt unable to offer      MEDICATIONS  (STANDING):  ascorbic acid 500 milliGRAM(s) Oral daily  collagenase Ointment 1 Application(s) Topical two times a day  folic acid 1 milliGRAM(s) Oral daily  hydrocortisone sodium succinate Injectable 50 milliGRAM(s) IV Push every 8 hours  levothyroxine Injectable 80 MICROGram(s) IV Push at bedtime  midodrine 10 milliGRAM(s) Oral every 8 hours  multivitamin 1 Tablet(s) Oral daily  piperacillin/tazobactam IVPB.. 3.375 Gram(s) IV Intermittent every 8 hours  polyethylene glycol 3350 17 Gram(s) Oral daily  senna 2 Tablet(s) Oral at bedtime  sertraline 25 milliGRAM(s) Oral daily  sodium chloride 0.45%. 1000 milliLiter(s) (125 mL/Hr) IV Continuous <Continuous>  tamoxifen 20 milliGRAM(s) Oral daily  thiamine 100 milliGRAM(s) Oral daily  vancomycin  IVPB 500 milliGRAM(s) IV Intermittent every 24 hours    MEDICATIONS  (PRN):  acetaminophen  Tablet 650 milliGRAM(s) Oral every 6 hours PRN Temp greater or equal to 38C (100.4F), Mild Pain (1 - 3)  melatonin 3 milliGRAM(s) Oral at bedtime PRN Insomnia      Allergies  Tapazole (Hives)      SOCIAL HISTORY:  / lives in SNF;  No  smoking, ETOH, drugs    FAMILY HISTORY:No pertinent family history in first degree relatives       no h/o PVD or wound healing or skin/ significant problems    PHYSICAL EXAM:  Vital Signs Last 24 Hrs  T(C): 36.4 (31 Jan 2024 12:50), Max: 39.7 (30 Jan 2024 17:00)  T(F): 97.6 (31 Jan 2024 12:50), Max: 103.4 (30 Jan 2024 17:00)  HR: 58 (31 Jan 2024 12:50) (58 - 103)  BP: 81/57 (31 Jan 2024 12:50) (71/56 - 136/50)  BP(mean): 68 (31 Jan 2024 09:46) (57 - 114)  RR: 15 (31 Jan 2024 12:50) (15 - 34)  SpO2: 95% (31 Jan 2024 12:50) (94% - 100%)    Parameters below as of 31 Jan 2024 12:50  Patient On (Oxygen Delivery Method): nasal cannula  O2 Flow (L/min): 2          LABS/ CULTURES/ RADIOLOGY:                        9.2    11.65 )-----------( 136      ( 31 Jan 2024 06:48 )             32.8       153  |  121  |  28  ----------------------------<  103      [01-31-24 @ 06:48]  3.5   |  23  |  0.36        Ca     7.2     [01-31-24 @ 06:48]      Mg     2.0     [01-31-24 @ 06:48]    TPro  5.0  /  Alb  2.4  /  TBili  0.6  /  DBili  x   /  AST  37  /  ALT  60  /  AlkPhos  49  [01-31-24 @ 06:48]    PT/INR: PT 17.8 , INR 1.72       [01-30-24 @ 16:47]  PTT: 29.6       [01-30-24 @ 16:47]      A1C with Estimated Average Glucose Result: 5.8 % (01-31-24 @ 06:48)  A1C with Estimated Average Glucose Result: 5.7 % (09-19-23 @ 10:33)      Culture - Blood (collected 01-30-24 @ 16:30)  Source: .Blood Blood-Peripheral  Gram Stain (01-31-24 @ 15:32):    Growth in aerobic bottle: Gram Positive Cocci in Clusters  Preliminary Report (01-31-24 @ 15:33):    Growth in aerobic bottle: Gram Positive Cocci in Clusters    Direct identification is available within approximately 3-5    hours either by Blood Panel Multiplexed PCR or Direct    MALDI-TOF. Details: [email or web address]                       Wound SURGERY CONSULT NOTE    HPI:  Pt non-verbal at baseline. Hx obtained via chart review and .    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 Found to have leukocytosis, tachycardia and hypotension here as well. Difficult to assess, contracted and non-verbal.    Spangler cathter placed in ED, POA.    Of note, patient was recently discharged after hospitalization for UTI and hypernatremia complicated by AHRF 2/2 to aspiration pneumonia. She is s/p PEG placement on 1/11/24.     Wound consult requested by team to assist w/ management of multiple pressure injuries.   Pt unable to c/o pain, drainage, odor, color change,  or worsening swelling. Offloading and pericare initiated upon admission as pt sedentary 2/2 to illness. Pt is Incontinent of urine & stool. Pt well known to wound team from previous admission- pt had multiple debridements and d/c to SNF w/ medihoney and aquacel dressings.  No new h/o bites, scratches, falls, trauma.  Appetite poor w/  weight loss.  RD and S&S noted from previous admission.  Pt now w/ PEG.      Current Diet: Diet, NPO with Tube Feed:   Tube Feeding Modality: Gastrostomy  Jevity 1.2 Vidal (JEVITY1.2RTH)  Continuous  Starting Tube Feed Rate mL per Hour: 10  Increase Tube Feed Rate by (mL): 10     Every 4 hours  Until Goal Tube Feed Rate (mL per Hour): 50  Tube Feed Duration (in Hours): 24  Tube Feed Start Time: 07:30  Free Water Flush   Total Volume per Flush (mL): 60   Frequency: Every 4 Hours   Total Daily Volume of Flush (mL): 360 (01-31-24 @ 01:19)      PAST MEDICAL & SURGICAL HISTORY:  Hypothyroidism 2/2 Radio Iodine    Hyperthyroidism  1975- s/p radio active iodine RX    Breast cancer  s/p left mastectomy    SVT (supraventricular tachycardia)    SAH (subarachnoid hemorrhage)    Multiple falls    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 3.375 Gram(s) IV Intermittent every 8 hours  polyethylene glycol 3350 17 Gram(s) Oral daily  senna 2 Tablet(s) Oral at bedtime  sertraline 25 milliGRAM(s) Oral daily  sodium chloride 0.45%. 1000 milliLiter(s) (125 mL/Hr) IV Continuous <Continuous>  tamoxifen 20 milliGRAM(s) Oral daily  thiamine 100 milliGRAM(s) Oral daily  vancomycin  IVPB 500 milliGRAM(s) IV Intermittent every 24 hours    MEDICATIONS  (PRN):  acetaminophen  Tablet 650 milliGRAM(s) Oral every 6 hours PRN Temp greater or equal to 38C (100.4F), Mild Pain (1 - 3)  melatonin 3 milliGRAM(s) Oral at bedtime PRN Insomnia      Allergies  Tapazole (Hives)      SOCIAL HISTORY:  / lives in SNF;  No  smoking, ETOH, drugs    FAMILY HISTORY: No pertinent family history in first degree relatives       no h/o PVD or wound healing or skin/ significant problems    PHYSICAL EXAM:  Vital Signs Last 24 Hrs  T(C): 36.4 (31 Jan 2024 12:50), Max: 39.7 (30 Jan 2024 17:00)  T(F): 97.6 (31 Jan 2024 12:50), Max: 103.4 (30 Jan 2024 17:00)  HR: 58 (31 Jan 2024 12:50) (58 - 103)  BP: 81/57 (31 Jan 2024 12:50) (71/56 - 136/50)  BP(mean): 68 (31 Jan 2024 09:46) (57 - 114)  RR: 15 (31 Jan 2024 12:50) (15 - 34)  SpO2: 95% (31 Jan 2024 12:50) (94% - 100%)    Parameters below as of 31 Jan 2024 12:50  Patient On (Oxygen Delivery Method): nasal cannula  O2 Flow (L/min): 2    NAD, lethargic, cachectic, frail  WD/ WN/  WG  Versa Care P500  bed  HEENT:  NC/AT, sclera clear, mucosa moist, throat clear, trachea midline, neck supple    Lt ear helix/pinna w/ maroon skin changes w/o blistering   Respiratory: nonlabored w/ equal chest rise  Gastrointestinal: soft NT/ND (+)PEG  Neurology:  nonverbal, no follow commands, paraplegic  Psych: calm/ appropriate  Musculoskeletal: stiff pROM, w/ contractures  Vascular: BLE equally warn,  no cyanosis, clubbing, nor acute ischemia         BLE edema equal         BLE DP pulses palpable  Skin:  thin, dry, pale, frail,  ecchymosis w/o hematoma  lower buttocks w/ hyperpigmentation of incontinence   left medial foot over great toe and bunion     w/  DTI dried blister  8cm x 4cm  left trochanter (HIP) unstageable pressure injury     w/ deep purple and black skin changes no blistering     5cm x 6cm   left posterior shoulder unstageable pressure injury      soft black eschar w/o blistering     scant serosanguinous drainage    Right (hip)  stage 4 pressure injury    4cm x 4cm x 2,5cm   w/  slough  tissue w/ undermining      in  adjacent area of partial thickness skin loss w/ slough       cm x 8cm x 1.5cm w/ undermining greatest at 9:00 of 4cm        serosanguinous drainage  Right upper buttock stage 4 pressure  injury   granular and slough tissue   5 cm  x 4 cm X 1cm   lifting soft grey eschar    serosanguinous discharge  sacrum stage 4 pressure injury    w/ slough and granular tissue w/ hyperpigmented periwound skin    8cm x 6cm x 1cm.     serosanguinous drainage  thoracic spine unstageable pressure injury    soft grey/ brown eschar     10cm x 3cm  no new blistering  or drainage  No odor, erythema, increased warmth, tenderness, induration, fluctuance, nor crepitus        LABS/ CULTURES/ RADIOLOGY:                        9.2    11.65 )-----------( 136      ( 31 Jan 2024 06:48 )             32.8       153  |  121  |  28  ----------------------------<  103      [01-31-24 @ 06:48]  3.5   |  23  |  0.36        Ca     7.2     [01-31-24 @ 06:48]      Mg     2.0     [01-31-24 @ 06:48]    TPro  5.0  /  Alb  2.4  /  TBili  0.6  /  DBili  x   /  AST  37  /  ALT  60  /  AlkPhos  49  [01-31-24 @ 06:48]    PT/INR: PT 17.8 , INR 1.72       [01-30-24 @ 16:47]  PTT: 29.6       [01-30-24 @ 16:47]      A1C with Estimated Average Glucose Result: 5.8 % (01-31-24 @ 06:48)  A1C with Estimated Average Glucose Result: 5.7 % (09-19-23 @ 10:33)      Culture - Blood (collected 01-30-24 @ 16:30)  Source: .Blood Blood-Peripheral  Gram Stain (01-31-24 @ 15:32):    Growth in aerobic bottle: Gram Positive Cocci in Clusters  Preliminary Report (01-31-24 @ 15:33):    Growth in aerobic bottle: Gram Positive Cocci in Clusters    Direct identification is available within approximately 3-5    hours either by Blood Panel Multiplexed PCR or Direct    MALDI-TOF. Details: [email or web address]

## 2024-01-31 NOTE — CONSULT NOTE ADULT - SUBJECTIVE AND OBJECTIVE BOX
CHIEF COMPLAINT: hypotension    HPI:  Patient nonverbal at baseline, history obtained from chart review.    Patient is a 68 year old F with hx of TBI, CVA (nonverbal, bedbound at baseline) hypothyroidism, L breast cancer (s/p mastectomy and breast tissue expander, on tamoxifen) who presents from [facility] for several days of lethargy, dec responsiveness, and fever. The patient was recently discharged (1/10/2024) after a prolonged hospitalization for UTI, hypernatremia, FTT, complicated by AHRF 2/2 to aspiration PNA.     In the ED, VS notable for HR , BP 70-90/50-60s, Tmax 103.4F, SpO2 100% on 2L NC. Labs remarkable for WBC 14.75, hgb 10.5, Na 151, Cl 115, and AST/ALT elevation. The patient received vancomycin, zosyn, 3L NS, 20 midodrine, 50 IV hydrocortisone, and started on mIVF with 1/2 NS. CT remarkable for sacral decubitus ulcer with possible osteomyelitis.    Upon the time of my assessment, patient with labile blood pressure (MAPs appropriate), HR 80s, febrile. Patient noted to be at baseline appearance and neurological status; contracted, nonverbal, eyes opening to voice. Several skin wounds and deep ulcers noted, most concerning at sacral. Also with mildly prolonged capillary refill and decreased skin turgor.       MICU consulted for persistent hypotension in the setting of sepsis from osteomyelitis.      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 Silverio Arthur, is HCP.     Allergies    Tapazole (Hives)    Intolerances        HOME MEDICATIONS:    REVIEW OF SYSTEMS:  [x] Unable to assess ROS because of mental status.    OBJECTIVE:  ICU Vital Signs Last 24 Hrs  T(C): 38.9 (31 Jan 2024 03:22), Max: 39.7 (30 Jan 2024 17:00)  T(F): 102.1 (31 Jan 2024 03:22), Max: 103.4 (30 Jan 2024 17:00)  HR: 81 (31 Jan 2024 04:40) (76 - 110)  BP: 74/50 (31 Jan 2024 04:40) (71/56 - 136/50)  BP(mean): 57 (31 Jan 2024 04:40) (57 - 114)  ABP: --  ABP(mean): --  RR: 20 (31 Jan 2024 04:40) (19 - 34)  SpO2: 100% (31 Jan 2024 04:40) (94% - 100%)    O2 Parameters below as of 31 Jan 2024 04:40  Patient On (Oxygen Delivery Method): nasal cannula  O2 Flow (L/min): 2    GENERAL: Contracted, chronically ill appearing, frail   HEAD: +temporal wasting   EYES: EOMI grossly  ENT: Dry mucous membranes  NECK: Supple, no JVD  HEART: Regular rate and rhythm, no murmurs, rubs, or gallops  LUNGS: Unlabored respirations.  Clear to auscultation bilaterally, no crackles, wheezing, or rhonchi  ABDOMEN: +PEG in place, dry crusted blood vs gastric contents surrounding entry site  EXTREMITIES: +DP pulses, +radial pulses, symmetric and equal bilaterally   NERVOUS SYSTEM: A&Ox0, contracted, unable to assess for focal deficits  SKIN: Skin breakdown noted in several areas, +L shoulder pressure injury, large sacral decubitus ulcer with exposed bone    HOSPITAL MEDICATIONS:  MEDICATIONS  (STANDING):  ascorbic acid 500 milliGRAM(s) Oral daily  collagenase Ointment 1 Application(s) Topical two times a day  folic acid 1 milliGRAM(s) Oral daily  levothyroxine 112 MICROGram(s) Oral daily  multivitamin 1 Tablet(s) Oral daily  piperacillin/tazobactam IVPB.. 3.375 Gram(s) IV Intermittent every 8 hours  polyethylene glycol 3350 17 Gram(s) Oral daily  senna 2 Tablet(s) Oral at bedtime  sertraline 25 milliGRAM(s) Oral daily  sodium chloride 0.45%. 1000 milliLiter(s) (125 mL/Hr) IV Continuous <Continuous>  tamoxifen 20 milliGRAM(s) Oral daily  thiamine 100 milliGRAM(s) Oral daily  vancomycin  IVPB 500 milliGRAM(s) IV Intermittent every 24 hours    MEDICATIONS  (PRN):  acetaminophen     Tablet .. 650 milliGRAM(s) Oral every 6 hours PRN Temp greater or equal to 38C (100.4F), Mild Pain (1 - 3)  melatonin 3 milliGRAM(s) Oral at bedtime PRN Insomnia      LABS:                        10.5   14.75 )-----------( 197      ( 30 Jan 2024 16:47 )             36.7     01-30    151<H>  |  114<H>  |  49<H>  ----------------------------<  189<H>  4.4   |  26  |  0.65    Ca    7.5<L>      30 Jan 2024 16:47    TPro  6.6  /  Alb  2.9<L>  /  TBili  0.7  /  DBili  x   /  AST  105<H>  /  ALT  105<H>  /  AlkPhos  73  01-30    PT/INR - ( 30 Jan 2024 16:47 )   PT: 17.8 sec;   INR: 1.72 ratio         PTT - ( 30 Jan 2024 16:47 )  PTT:29.6 sec  Urinalysis Basic - ( 30 Jan 2024 17:15 )    Color: Dark Yellow / Appearance: Clear / SG: >1.030 / pH: x  Gluc: x / Ketone: Negative mg/dL  / Bili: Negative / Urobili: 1.0 mg/dL   Blood: x / Protein: 30 mg/dL / Nitrite: Negative   Leuk Esterase: Small / RBC: 3 /HPF / WBC 17 /HPF   Sq Epi: x / Non Sq Epi: 2 /HPF / Bacteria: Negative /HPF    Venous Blood Gas:  01-30 @ 16:30  7.44/46/35/31/54.4  VBG Lactate: 2.2      RADIOLOGY:    < from: CT Lumbar Spine Reform w/ IV Cont (01.30.24 @ 19:05) >  IMPRESSION:    Sacral decubitus ulcer with large softtissue defect extending to the   level of the coccygeal tip is excluded on the field-of-view on this   study. Remainder of the thoracolumbar spine demonstrates no CT evidence   of osteomyelitis.    Please see concurrently performed CT chest abdomen pelvis for description   of extraspinal findings.    < end of copied text >      < from: CT Abdomen and Pelvis w/ IV Cont (01.30.24 @ 18:21) >  IMPRESSION:  No pulmonary embolism in the main, left and right, segmental and proximal   subsegmental pulmonary arteries. Limited evaluation of the distal   pulmonary arteries due to patient motion.    Degenerative changes. Sacral decubitus ulcer to the level of the sacrum,   with questionable cortical changes of the bony sacrum may represent   osteomyelitis.    < end of copied text >

## 2024-01-31 NOTE — CONSULT NOTE ADULT - PROBLEM SELECTOR RECOMMENDATION 4
surrogate is patient's  Silverio Arthur  VM left to arrange interdisciplinary family meeting to delineate GOC

## 2024-01-31 NOTE — CONSULT NOTE ADULT - ASSESSMENT
Patient is a 68 year old F with hx of TBI (nonverbal, bedbound at baseline), CVA. hypothyroidism, L breast cancer (s/p mastectomy) who was BIBEMS for increasing unresponsiveness, fever, tachycardia, and hypotension noted at living facility. Patient found to have sepsis from suspected osteomyelitis. MICU consulted for hypotension.    #sepsis 2/2 to osteomyelitis  #failure to thrive  #hypernatremia  #acute kidney injury  #enteritis  - patient with chronic failure to thrive, multiple admissions for hypernatremia, FTT; recent PEG placement  - patient found to have deep sacral decubitus ulcer with exposed bone, questionable evidence of OM on CT imaging; but appears worse compared to prior CT imaging from last admission during 1/2024  - patient presenting with hypotension, but noted to have labile blood pressure readings, though generally appears to have improved after fluid resuscitation and vasoactive medications, now MAPs > 65  - patient with low documented BP readings; however appear to be at least intermittently erroneous given lability and stable perfusion indices (lactate 1.4, adequate urine output, baseline mental status, etc)  - patient is s/p 3L NS, midodrine 20 mg x1, hydrocortisone 50 mg q8h for hypotension in the setting of sepsis from suspected osteomyelitis    Recommendations:  > continue broad spectrum antibiotics with vancomycin and zosyn; definitive management of OM will be source control which patient is unfortunately a poor candidate for  > recommend continued IVF resuscitation, agree with mIVF; patient appears to be hypovolemic on exam given delayed cap refill, decreased skin turgor, concentrated-appearing urine, POCUS with poor views but grossly flat-appearing IVC  > agree with midodrine q8h as needed and stress dose steroids  > can trial albumin if needed given good response during prior admission  > recommend continued GOC given recurrent hospitalizations, frailty, and overall guarded prognosis  > patient is not a MICU candidate at this time, please reconsult as needed    Discussed with Dr. Daniel Patient is a 68 year old F with hx of TBI (nonverbal, bedbound at baseline), CVA. hypothyroidism, L breast cancer (s/p mastectomy) who was BIBEMS for increasing unresponsiveness, fever, tachycardia, and hypotension noted at living facility. Patient found to have sepsis from suspected osteomyelitis. MICU consulted for hypotension.    #sepsis 2/2 to osteomyelitis  #failure to thrive  #hypernatremia  #acute kidney injury  #enteritis  - patient with chronic failure to thrive, multiple admissions for hypernatremia, FTT; recent PEG placement  - patient found to have deep sacral decubitus ulcer with exposed bone, questionable evidence of OM on CT imaging; but appears worse compared to prior CT imaging from last admission during 1/2024  - patient presenting with hypotension, but noted to have labile blood pressure readings, though generally appears to have improved after fluid resuscitation and vasoactive medications, now MAPs > 65  - patient with low documented BP readings; however appear to be at least intermittently erroneous given lability and stable perfusion indices (lactate 1.4, adequate urine output, baseline mental status, etc)  - patient is s/p 3L NS, midodrine 20 mg x1, hydrocortisone 50 mg q8h for hypotension in the setting of sepsis from suspected osteomyelitis    Recommendations:  > continue broad spectrum antibiotics with vancomycin and zosyn; definitive management of OM will be source control which patient is unfortunately a poor candidate for  > recommend continued IVF resuscitation, agree with mIVF; patient appears to be hypovolemic on exam given delayed cap refill, decreased skin turgor, concentrated-appearing urine, POCUS with poor views but grossly flat-appearing IVC  > continue with midodrine q8h as needed  > can trial albumin if needed given good response during prior admission  > recommend continued GOC given recurrent hospitalizations, frailty, and overall guarded prognosis  > patient is not a MICU candidate at this time, please reconsult as needed    Discussed with Dr. Daniel History obtained via hospitalist and chart review. PT with poor baseline mental status.     Patient is a 68 year old F with hx of TBI (nonverbal, bedbound/contracted at baseline) s/p PEG last admission due to recurrent hypernatremia and chronic malnutrition, CVA. hypothyroidism, L breast cancer (s/p mastectomy tissue expander still in place ) who was brought in due to increasing unresponsiveness, fever, tachycardia, and hypotension. Patient found to have sepsis CT performed with concern for OM of sacral ulcer ( can be compared to CT from 1/1).     MICU consulted for hypotension.  #sepsis   #concern for OM  #+ua   #failure to thrive  #hypernatremia  #acute kidney injury ( baseline  Cr ~0.3)  #enteritis    Patient with chronic failure to thrive, multiple admissions for hypernatremia s/p PEG last admission , she is febrile and on ct imaging here patient found to have deep sacral decubitus ulcer with exposed bone.  On exam pt with MAP above 65 at this time she is at her baseline status making adequate urine DP pulses radial pulses intact. She is s/p 3L NS, midodrine 20 mg x1, hydrocortisone 50 mg.     Unfortunately pt with multiple decubitus ulcers and poor wound healing which appear to have worsened since last admission. At this time would continue broad spectrum antibiotics (vanc,zosyn). Surg to see and suspect definitive management of OM would be source control however due to chronic state pt poor surgical candidate. When able would clarify if pt has had episodes of diarrhea ( enteritis on CT)- if so consider addition of stool culture.  Her avila was exchanged ua with + wbc ( hx of e.coli infection with pansensitivy ) therefore current abx would cover. Clinically pt appears volume down -would continue IVF and  midodrine q8h via PEG at this time. Can consider albumin if continued hypotension.  Per chart review it appears multiple GOC conversations have been had with  and despite frailty and poor prognosis he continues to want aggressive medical management    Pt does not require MICU level of care at this time please reconsult as necessary.

## 2024-01-31 NOTE — ED ADULT NURSE REASSESSMENT NOTE - NS ED NURSE REASSESS COMMENT FT1
MICU at bedside MICU at bedside, Pt to remain admitted to tele floor. Admitting team to be contacted with consistent MAP <65

## 2024-01-31 NOTE — CONSULT NOTE ADULT - ASSESSMENT
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 Admitted for sepsis likely 2/2 to wound infection vs osteomyelitis.    Wound Consult requested to assist w/ management of  Sacral/ Buttocks /rt  hip stage 4 pressure injury  left shoulder/ hip/spine unstageable pressure injury  Lt Ear DTI  Incontinence Associated Dermatitis  Lt foot DTI    Rt Hip & Sacrum & Buttocks - pack w/ 1/4 strength Dakins dressing BID  Lt Hip /spine / lt shoulder- Betadine + ALLEVYN QD  Lt ear - cavilon QD  Buttocks CAVILON Advance TIW and pericare BID and prn soiling        Continue w/ attends under pads and Pericare as per protocol  Left foot DTI--Betadine QD  Appreciate Palliative & GOC  Abx per Medicine  Ongoing GOC, appreciate Palliative Consult  Moisturize intact skin w/ SWEEN cream BID  Nutrition optimization Severe protein-calorie malnutrition       high quality protein TF, mor/ prosource, MVI & Vit C to promote wound healing  Continue turning and positioning w/ offloading assistive devices as per protocol  Waffle Cushion to chair when oob to chair  Continue w/ low air loss pressure redistribution bed surface   Pt will need Group 2 mattress on hospital bed and ROHO cushion for wheel chair upon discharge home  Care as per medicine, will follow w/ you  Upon discharge f/u as outpatient at [facility] [address] [place] [address] [phone]  D/w team & RN & attng  Thank you for this consult  Codi Thompson PA-C [id number]  Nights/ Weekends/ Holidays please call:  General Surgery Consult pager ([phone]) for emergencies  Wound PT for multilayer leg wrapping or VAC issues ([phone])   I spent 75minutes face to face w/ this pt of which more than 50% of the time was spent counseling & coordinating care of this pt.

## 2024-01-31 NOTE — CONSULT NOTE ADULT - ASSESSMENT
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 Found to have leukocytosis, tachycardia and hypotension here as well. Difficult to assess, contracted and non-verbal.    Spangler cathter placed in ED, POA.    Of note, patient was recently discharged after hospitalization for UTI and hypernatremia complicated by AHRF 2/2 to aspiration pneumonia. She is s/p PEG placement on 1/11/24.(From H&P). Palliative care consulted for assistance with GOC.

## 2024-01-31 NOTE — PROGRESS NOTE ADULT - SUBJECTIVE AND OBJECTIVE BOX
PROGRESS NOTE:   Authored by Dr. Ciera Phillips MD, Available on MS Teams    Patient is a 68y old  Female who presents with a chief complaint of Sepsis 2/2 osteomyelitis (31 Jan 2024 12:24)      SUBJECTIVE / OVERNIGHT EVENTS: Unable to obtain hx due to mental status    ADDITIONAL REVIEW OF SYSTEMS:    MEDICATIONS  (STANDING):  ascorbic acid 500 milliGRAM(s) Oral daily  collagenase Ointment 1 Application(s) Topical two times a day  folic acid 1 milliGRAM(s) Oral daily  hydrocortisone sodium succinate Injectable 50 milliGRAM(s) IV Push every 8 hours  levothyroxine Injectable 80 MICROGram(s) IV Push at bedtime  midodrine 10 milliGRAM(s) Oral every 8 hours  multivitamin 1 Tablet(s) Oral daily  piperacillin/tazobactam IVPB.. 3.375 Gram(s) IV Intermittent every 8 hours  polyethylene glycol 3350 17 Gram(s) Oral daily  senna 2 Tablet(s) Oral at bedtime  sertraline 25 milliGRAM(s) Oral daily  sodium chloride 0.45%. 1000 milliLiter(s) (125 mL/Hr) IV Continuous <Continuous>  tamoxifen 20 milliGRAM(s) Oral daily  thiamine 100 milliGRAM(s) Oral daily  vancomycin  IVPB 500 milliGRAM(s) IV Intermittent every 24 hours    MEDICATIONS  (PRN):  acetaminophen     Tablet .. 650 milliGRAM(s) Oral every 6 hours PRN Temp greater or equal to 38C (100.4F), Mild Pain (1 - 3)  melatonin 3 milliGRAM(s) Oral at bedtime PRN Insomnia      CAPILLARY BLOOD GLUCOSE      POCT Blood Glucose.: 103 mg/dL (31 Jan 2024 11:36)    I&O's Summary      PHYSICAL EXAM:  Vital Signs Last 24 Hrs  T(C): 36.4 (31 Jan 2024 12:50), Max: 39.7 (30 Jan 2024 17:00)  T(F): 97.6 (31 Jan 2024 12:50), Max: 103.4 (30 Jan 2024 17:00)  HR: 58 (31 Jan 2024 12:50) (58 - 110)  BP: 81/57 (31 Jan 2024 12:50) (71/56 - 136/50)  BP(mean): 68 (31 Jan 2024 09:46) (57 - 114)  RR: 15 (31 Jan 2024 12:50) (15 - 34)  SpO2: 95% (31 Jan 2024 12:50) (94% - 100%)    Parameters below as of 31 Jan 2024 12:50  Patient On (Oxygen Delivery Method): nasal cannula  O2 Flow (L/min): 2      CONSTITUTIONAL: ill appearing, cachectic   RESPIRATORY: Normal respiratory effort; no wheezing  CARDIOVASCULAR: Regular rate and rhythm, normal S1 and S2, no murmur/rub/gallop; No lower extremity edema  ABDOMEN: Nontender to palpation, normoactive bowel sounds, no rebound/guarding, +PEG  MUSCLOSKELETAL: extremities are stiff and contracted  SKIN: multiple ulcers on the back  PSYCH: A+Ox0    LABS:                        9.2    11.65 )-----------( 136      ( 31 Jan 2024 06:48 )             32.8     01-31    153<H>  |  121<H>  |  28<H>  ----------------------------<  103<H>  3.5   |  23  |  0.36<L>    Ca    7.2<L>      31 Jan 2024 06:48  Mg     2.0     01-31    TPro  5.0<L>  /  Alb  2.4<L>  /  TBili  0.6  /  DBili  x   /  AST  37  /  ALT  60<H>  /  AlkPhos  49  01-31    PT/INR - ( 30 Jan 2024 16:47 )   PT: 17.8 sec;   INR: 1.72 ratio         PTT - ( 30 Jan 2024 16:47 )  PTT:29.6 sec      Urinalysis Basic - ( 31 Jan 2024 06:48 )    Color: x / Appearance: x / SG: x / pH: x  Gluc: 103 mg/dL / Ketone: x  / Bili: x / Urobili: x   Blood: x / Protein: x / Nitrite: x   Leuk Esterase: x / RBC: x / WBC x   Sq Epi: x / Non Sq Epi: x / Bacteria: x    Discussed with ID, will continue vanc/zosyn. Wound care for debridement if possible

## 2024-01-31 NOTE — CHART NOTE - NSCHARTNOTEFT_GEN_A_CORE
Notified by RN that patient due for synthroid but unable to crush   Discussed conversion of oral to IV with pharmacy; IV dose is 70-80% of oral   Patient takes 112 mcg orally; 80 mcg IV ordered and dosing confirmed with pharmacist    Maribel Bliss PA-C  Dept of Medicine

## 2024-01-31 NOTE — CONSULT NOTE ADULT - PROBLEM SELECTOR RECOMMENDATION 5
will continue to GO  case discussed with primary team  Can be reached by TEAMS M-F 9-5 Erika Rodríguez Any other time please page [phone] if needed

## 2024-01-31 NOTE — PROGRESS NOTE ADULT - PROBLEM SELECTOR PLAN 4
- QTc of 561  - Calcium gluconate 2mg IVx1 given  - Optimize K, Mg, Ca  - Repeat EKG pending  - Avoid QT prolonging agents

## 2024-01-31 NOTE — CONSULT NOTE ADULT - SUBJECTIVE AND OBJECTIVE BOX
HPI:  68 f with  TBI, SAH, non-verbal at baseline, CVA, sacral ulcer, graves, L breast ca s/p mastectomy on tamoxifen, aspiration s/p PEG, multiple decubiti and sacral osteo, sent from NH for  multiple days of less responsiveness and fever, here febrile to 103.4, tachycardic, hypotensive to 70s presents from [facility] due to multiple days of reduced responsiveness and fever. Found to have leukocytosis, tachycardia and hypotension here as well. Difficult to assess, contracted and non-verbal.      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 3.375 every 8 hours  vancomycin  IVPB 500 every 24 hours      OTHER MEDS:  acetaminophen     Tablet .. 650 milliGRAM(s) Oral every 6 hours PRN  ascorbic acid 500 milliGRAM(s) Oral daily  collagenase Ointment 1 Application(s) Topical two times a day  folic acid 1 milliGRAM(s) Oral daily  hydrocortisone sodium succinate Injectable 50 milliGRAM(s) IV Push every 8 hours  levothyroxine Injectable 80 MICROGram(s) IV Push at bedtime  melatonin 3 milliGRAM(s) Oral at bedtime PRN  midodrine 10 milliGRAM(s) Oral every 8 hours  multivitamin 1 Tablet(s) Oral daily  polyethylene glycol 3350 17 Gram(s) Oral daily  senna 2 Tablet(s) Oral at bedtime  sertraline 25 milliGRAM(s) Oral daily  sodium chloride 0.45%. 1000 milliLiter(s) IV Continuous <Continuous>  tamoxifen 20 milliGRAM(s) Oral daily  thiamine 100 milliGRAM(s) Oral daily      SOCIAL HISTORY:  lives at NH, dependant on all ADLs  no recent travel    FAMILY HISTORY:  unable to obtain due to mental status        ROS:  Unobtainable because: non verbal    Physical Exam:    General:    cachectic, diaphoretic  Head: atraumatic, normocephalic  Eyes: normal sclera and conjunctiva  ENT: no LAD, neck supple  Cardio:    regular   Respiratory:   clear b/l, no wheezing  abd:   soft, PEG with brownish drainage around it, not tender  :   no suprapubic tenderness, + avila  Musculoskeletal : contracted, no joint swelling, no edema  Skin: multiple wounds including sacral with necrotic edge, back with linear eschar  vascular: no phlebitis  Neurologic:     baseline non verbal  psych: normal affect      Drug Dosing Weight  Height (cm): 160 (30 Jan 2024 16:10)  Weight (kg): 36.3 (30 Jan 2024 16:10)  BMI (kg/m2): 14.2 (30 Jan 2024 16:10)  BSA (m2): 1.31 (30 Jan 2024 16:10)    Vital Signs Last 24 Hrs  T(F): 97.2 (01-31-24 @ 12:01), Max: 103.4 (01-30-24 @ 17:00)    Vital Signs Last 24 Hrs  HR: 60 (01-31-24 @ 12:01) (60 - 110)  BP: 117/8 (01-31-24 @ 12:01) (71/56 - 136/50)  RR: 20 (01-31-24 @ 12:01)  SpO2: 99% (01-31-24 @ 12:01) (94% - 100%)  Wt(kg): --                          9.2    11.65 )-----------( 136      ( 31 Jan 2024 06:48 )             32.8       01-31    153<H>  |  121<H>  |  28<H>  ----------------------------<  103<H>  3.5   |  23  |  0.36<L>    Ca    7.2<L>      31 Jan 2024 06:48  Mg     2.0     01-31    TPro  5.0<L>  /  Alb  2.4<L>  /  TBili  0.6  /  DBili  x   /  AST  37  /  ALT  60<H>  /  AlkPhos  49  01-31      Urinalysis Basic - ( 31 Jan 2024 06:48 )    Color: x / Appearance: x / SG: x / pH: x  Gluc: 103 mg/dL / Ketone: x  / Bili: x / Urobili: x   Blood: x / Protein: x / Nitrite: x   Leuk Esterase: x / RBC: x / WBC x   Sq Epi: x / Non Sq Epi: x / Bacteria: x        MICROBIOLOGY:  v  Catheterized Catheterized  01-18-24   <10,000 CFU/mL Normal Urogenital Mireya  --  --                  RADIOLOGY:    Images independently visualized and reviewed personally,  findings as below    < from: CT Lumbar Spine Reform w/ IV Cont (01.30.24 @ 19:05) >  IMPRESSION:    Sacral decubitus ulcer with large softtissue defect extending to the   level of the coccygeal tip is excluded on the field-of-view on this   study. Remainder of the thoracolumbar spine demonstrates no CT evidence   of osteomyelitis.    Please see concurrently performed CT chest abdomen pelvis for description   of extraspinal findings.    < end of copied text >  < from: CT Abdomen and Pelvis w/ IV Cont (01.30.24 @ 18:21) >    IMPRESSION:  No pulmonary embolism in the main, left and right, segmental and proximal   subsegmental pulmonary arteries. Limited evaluation of the distal   pulmonary arteries due to patient motion.    Degenerative changes. Sacral decubitus ulcer to the level of the sacrum,   with questionable cortical changes of the bony sacrum may represent   osteomyelitis.    < end of copied text >

## 2024-01-31 NOTE — PROGRESS NOTE ADULT - ASSESSMENT
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 Admitted for sepsis likely 2/2 to wound infection vs osteomyelitis.

## 2024-01-31 NOTE — ED ADULT NURSE REASSESSMENT NOTE - NS ED NURSE REASSESS COMMENT FT1
RRT called by ED attg for hypotension. BP 47/37 at time of call. pt overall appearance and status unchanged. RRT outcome: to give 1L NS bolus, pt to stay admitted to medsurg service.

## 2024-01-31 NOTE — CONSULT NOTE ADULT - ASSESSMENT
68 f with  TBI, SAH, non-verbal at baseline, CVA, sacral ulcer, graves, L breast ca s/p mastectomy on tamoxifen, aspiration s/p PEG, multiple decubiti and sacral osteo, sent from NH for  multiple days of less responsiveness and fever, here febrile to 103.4, tachycardic, hypotensive to 70s presents from [facility] due to multiple days of reduced responsiveness and fever. Found to have leukocytosis, tachycardia and hypotension here as well. Difficult to assess, contracted and non-verbal.  has multiple wound with necrotic edge  WBC: 14, Na: 151, u/a with 17 WBC  CT: no PE, sacral decub to the level of sacrum and ?osteo    fever, tachycardia, hypotension, leukocytosis, transaminitis, hypernatremia septic shock, r/o bacteremia  has multiple wound so wound infection possible but the sacral osteo is chronic    * on fluids, midodrine hydrocortisone and not hypotensive anymore  * follow the blood and urine cx  * wound care eval  * c/w vanco and zosyn for now  * monitor CBC/diff and CMP    The above assessment and plan was discussed with the primary team    Katherin Teixeira MD  contact on teams  After 5pm and on weekends call [phone]

## 2024-01-31 NOTE — CONSULT NOTE ADULT - SUBJECTIVE AND OBJECTIVE BOX
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 Found to have leukocytosis, tachycardia and hypotension here as well. Difficult to assess, contracted and non-verbal.    Spangler cathter placed in ED, POA.    Of note, patient was recently discharged after hospitalization for UTI and hypernatremia complicated by AHRF 2/2 to aspiration pneumonia. She is s/p PEG placement on 1/11/24.(From [facility]). Palliative care consulted for assistance with GOC.       PERTINENT PM/SXH:   Hypothyroidism    Hyperthyroidism    Breast cancer    SVT (supraventricular tachycardia)    SAH (subarachnoid hemorrhage)    Multiple falls      Salivary Gland Disease    C Section    Abnormality, eye    S/P D&C (status post dilation and curettage)    H/O left mastectomy      FAMILY HISTORY:  No pertinent family history in first degree relatives        ITEMS NOT CHECKED ARE NOT PRESENT    SOCIAL HISTORY:   Significant other/partner[ x]  Children[ x]  Episcopalian/Spirituality:  Substance hx:  [ ]   Tobacco hx:  [ ]   Alcohol hx: [ ]   Home Opioid hx:  [ ] I-Stop Reference No:  Living Situation: [x ]Home  [ ]Long term care  [ ]Rehab [ ]Other    ADVANCE DIRECTIVES:    DNR/MOLST  [ ]  Living Will  [ ]   DECISION MAKER(s):  [ ] Health Care Proxy(s)  [x ] Surrogate(s)  [ ] Guardian           Name(s): Phone Number(s): Silverio Arthur     BASELINE (I)ADL(s) (prior to admission):  [place]: [ ]Total  [ ] Moderate [ x]Dependent    Allergies    Tapazole (Hives)    Intolerances    MEDICATIONS  (STANDING):  ascorbic acid 500 milliGRAM(s) Oral daily  collagenase Ointment 1 Application(s) Topical two times a day  folic acid 1 milliGRAM(s) Oral daily  hydrocortisone sodium succinate Injectable 50 milliGRAM(s) IV Push every 8 hours  levothyroxine Injectable 80 MICROGram(s) IV Push at bedtime  midodrine 10 milliGRAM(s) Oral every 8 hours  multivitamin 1 Tablet(s) Oral daily  piperacillin/tazobactam IVPB.. 3.375 Gram(s) IV Intermittent every 8 hours  polyethylene glycol 3350 17 Gram(s) Oral daily  senna 2 Tablet(s) Oral at bedtime  sertraline 25 milliGRAM(s) Oral daily  sodium chloride 0.45%. 1000 milliLiter(s) (125 mL/Hr) IV Continuous <Continuous>  tamoxifen 20 milliGRAM(s) Oral daily  thiamine 100 milliGRAM(s) Oral daily  vancomycin  IVPB 500 milliGRAM(s) IV Intermittent every 24 hours    MEDICATIONS  (PRN):  acetaminophen     Tablet .. 650 milliGRAM(s) Oral every 6 hours PRN Temp greater or equal to 38C (100.4F), Mild Pain (1 - 3)  melatonin 3 milliGRAM(s) Oral at bedtime PRN Insomnia    PRESENT SYMPTOMS: [x ]Unable to self-report see CPOT, PAINADs, RDOS  Source if other than patient:  [ ]Family   [ x]Team     Pain: [ ]yes [ ]no  QOL impact -   Location -                    Aggravating factors -  Quality -  Radiation -  Timing-  Severity (0-10 scale):  Minimal acceptable level (0-10 scale):       Dyspnea:                           [ ]Mild [ ]Moderate [ ]Severe  Anxiety:                             [ ]Mild [ ]Moderate [ ]Severe  Fatigue:                             [ ]Mild [ ]Moderate [ ]Severe  Nausea:                             [ ]Mild [ ]Moderate [ ]Severe  Loss of appetite:              [ ]Mild [ ]Moderate [ ]Severe  Constipation:                    [ ]Mild [ ]Moderate [ ]Severe    PCSSQ [Palliative Care Spiritual Screening Question]   Severity (0-10):  Score of 4 or > indicate consideration of Chaplaincy referral.  Chaplaincy Referral: [ ] yes [ ] refused [ ] following    Caregiver [place]? : [ ] yes [ ] no [ ] deferred:  Social work referral [ ] Patient & Family Centered Care Referral [ ]     Anticipatory Grief Present?: [x] yes [ ] no  [ ] deferred: Palliative Social work referral [x]  Patient & Family Centered Care Referral [ ]       Other Symptoms:  [ ]All other review of systems negative    x Unable to obtain due to poor mentation    PHYSICAL EXAM:  Vital Signs Last 24 Hrs  T(C): 36.4 (31 Jan 2024 12:50), Max: 39.7 (30 Jan 2024 17:00)  T(F): 97.6 (31 Jan 2024 12:50), Max: 103.4 (30 Jan 2024 17:00)  HR: 58 (31 Jan 2024 12:50) (58 - 110)  BP: 81/57 (31 Jan 2024 12:50) (71/56 - 136/50)  BP(mean): 68 (31 Jan 2024 09:46) (57 - 114)  RR: 15 (31 Jan 2024 12:50) (15 - 34)  SpO2: 95% (31 Jan 2024 12:50) (94% - 100%)    Parameters below as of 31 Jan 2024 12:50  Patient On (Oxygen Delivery Method): nasal cannula  O2 Flow (L/min): 2   I&O's Summary      GENERAL:  [ ]Alert  [ Oriented x  [ ]Lethargic  [ ]Cachexia  [x]Unarousable  [x]Verbal  [ ]Non-Verbal  Behavioral:   [ ]Anxiety  [ ]Delirium [ ]Agitation [ ]Other  HEENT:  [ ]Normal   [ x]Dry mouth   [ ]ET Tube/Trach  [ ]Oral lesions  PULMONARY:   [ ]Clear [ ]Tachypnea  [ ]Audible excessive secretions   [ ]Rhonchi        [ ]Right [ ]Left [ ]Bilateral  [ ]Crackles        [ ]Right [ ]Left [ ]Bilateral  [ ]Wheezing     [ ]Right [ ]Left [ ]Bilateral  [x ]Diminished BS [ ] Right [ ]Left [ x]Bilateral  CARDIOVASCULAR:    [x]Regular [ ]Irregular [ ]Tachy  [ ]James [ ]Murmur [ ]Other  GASTROINTESTINAL:  [x]Soft  [ ]Distended   [x]+BS  [x]Non tender [ ]Tender  [x]PEG [ ]OGT/ NGT   Last BM:    GENITOURINARY:  [ ]Normal [x ]Incontinent   [ ]Oliguria/Anuria   [ ]Spangler  MUSCULOSKELETAL:   [ ]Normal   [x]Weakness  [x ]Bed/Wheelchair bound [ ]Edema  NEUROLOGIC:   [ ]No focal deficits  [x ] Cognitive impairment  [ ] Dysphagia [ ]Dysarthria [ ] Paresis [ ]Other   SKIN:   [ ]Normal  [ ]Rash   [x ]Pressure ulcer(s) [x ]y [ ]n present on admission    CRITICAL CARE:  [ ] Shock Present  [ ]Septic [ ]Cardiogenic [ ]Neurologic [ ]Hypovolemic  [ ]  Vasopressors [ ]  Inotropes   [ ]Respiratory failure present [ ]Mechanical ventilation [ ]Non-invasive ventilatory support [ ]High flow    [ ]Acute  [ ]Chronic [ ]Hypoxic  [ ]Hypercarbic [ ]Other  [ ]Other organ failure     LABS:                        9.2    11.65 )-----------( 136      ( 31 Jan 2024 06:48 )             32.8   01-31    153<H>  |  121<H>  |  28<H>  ----------------------------<  103<H>  3.5   |  23  |  0.36<L>    Ca    7.2<L>      31 Jan 2024 06:48  Mg     2.0     01-31    TPro  5.0<L>  /  Alb  2.4<L>  /  TBili  0.6  /  DBili  x   /  AST  37  /  ALT  60<H>  /  AlkPhos  49  01-31  PT/INR - ( 30 Jan 2024 16:47 )   PT: 17.8 sec;   INR: 1.72 ratio         PTT - ( 30 Jan 2024 16:47 )  PTT:29.6 sec    Urinalysis Basic - ( 31 Jan 2024 06:48 )    Color: x / Appearance: x / SG: x / pH: x  Gluc: 103 mg/dL / Ketone: x  / Bili: x / Urobili: x   Blood: x / Protein: x / Nitrite: x   Leuk Esterase: x / RBC: x / WBC x   Sq Epi: x / Non Sq Epi: x / Bacteria: x      RADIOLOGY & ADDITIONAL STUDIES:  < from: CT Lumbar Spine Reform w/ IV Cont (01.30.24 @ 19:05) >    ACC: [id number] EXAM:  CT REFORM SPINE L IC   ORDERED BY: MAIKOL DIAZ     ACC: [id number] EXAM:  CT REFORM SPINE T  IC   ORDERED BY: MAIKOL DIAZ     PROCEDURE DATE:  01/30/2024          INTERPRETATION:  HISTORY: Fevers, pressure ulcers, back pain    COMPARISON: CT chest abdomen pelvis 1/1/2024, 1/30/2024    TECHNIQUE: Axial noncontrast CT images of the thoracic and lumbar spine   were obtained. Coronal and sagittal reconstructions were performed. Bone   and soft tissue windows were evaluated.    FINDINGS:    The region of the sacral decubitus ulcer with large soft tissue defect   extending to the level of the coccygeal tip is excluded on the   field-of-view on this study. Remainder of the thoracolumbar spine   demonstrates no CT evidence of osteomyelitis.    Redemonstrated mild T9 and moderate L1 compression fracture deformities   with focal L1 bony retropulsion in the setting of osteopenia, unchanged.   Exaggerated thoracic kyphosis.    IMPRESSION:    Sacral decubitus ulcer with large softtissue defect extending to the   level of the coccygeal tip is excluded on the field-of-view on this   study. Remainder of the thoracolumbar spine demonstrates no CT evidence   of osteomyelitis.    Please see concurrently performed CT chest abdomen pelvis for description   of extraspinal findings.    --- End of Report ---          WALT IVORY MD; Resident Radiologist  This document has been electronically signed.  FABIO CARLOS MD; Attending Radiologist  This document has been electronically signed. Jan 30 2024  8:14PM    < end of copied text >    PROTEIN CALORIE MALNUTRITION PRESENT: [ ]mild [ ]moderate [ ]severe [ ]underweight [ ]morbid obesity  https://www.andeal.org/vault/2440/web/files/ONC/Table_Clinical%20Characteristics%20to%20Document%20Malnutrition-White%20JV%20et%20al%202012.pdf    Height (cm): 160 (01-30-24 @ 16:10), 160 (01-11-24 @ 13:12), 167.6 (09-18-23 @ 20:11)  Weight (kg): 36.3 (01-30-24 @ 16:10), 40.8 (01-11-24 @ 13:12), 49.9 (09-18-23 @ 20:11)  BMI (kg/m2): 14.2 (01-30-24 @ 16:10), 15.9 (01-11-24 @ 13:12), 17.8 (09-18-23 @ 20:11)    [ x]PPSV2 < or = to 30% [ ]significant weight loss  [ ]poor nutritional intake  [ ]anasarca[ ]Artificial Nutrition      Other REFERRALS:  [ ]Hospice  [ ]Child Life  [ ]Social Work  [ ]Case management [ ]Holistic Therapy     Care Coordination Assessment 201 [C. Provider] (12-21-23 @ 13:48)      Palliative Performance Scale:  http://npcrc.org/files/news/palliative_performance_scale_ppsv2.pdf  (Ctrl +  left click to view)  Respiratory Distress Observation Tool:  https://homecareinformation.net/handouts/hen/Respiratory_Distress_Observation_Scale.pdf (Ctrl +  left click to view)  PAINAD Score:  http://geriatrictoolkit.[facility]/cog/painad.pdf (Ctrl +  left click to view)    Goals of Care:   GOALS OF CARE:  · Participants	Family; Staff  · Spouse	Dr. Arthur  · Provider	Dr. David Kiran    Advance Directives:  · Does Patient Have a Surrogate	Yes  · Surrogate's Name	Dr. Arthur ()    Conversation Discussion:  · Conversation	Diagnosis; Prognosis; Treatment Options  · Conversation Details	Met with patient's  at bedside. Introduced myself and the role of palliative care. Dr. Arthur shared about the patient's baseline prior to the hospitalization, sharing she was able to ambulate with assistance and make her needs known. He was the primary caregiver at home. Dr. Arthur shared the reason he asked for palliative's input is to help him get his wife out of the hospital. Discussed what his goals are which include continued aggressive medical management. Both myself and the primary team providers at bedside provided a guarded prognosis, that mental status and functional status are likely a result of previous insult and diagnoses and the opportunity for recovery is limited. Discussed the patient is contracted, lethargic, unable to tolerate po. Discussed that artifical nutrition likely will not benefit in the patient's recovery and in fact could lead to further complications including aspiration. Dr. Arthur verbalized understanding but at this time is interested in pursuing a peg and continuing all aggressive medical interventions. Emotional support provided.    Location of Discussion:  · Location of discussion	Face to face    Time Spent on Advance Care Planning:  Attending or MARIAN Only.     I personally spent 26 minutes on advance care planning services with the patient. This time is separate and distinct from any other care management services provided on this date.

## 2024-01-31 NOTE — ED ADULT NURSE REASSESSMENT NOTE - NS ED NURSE REASSESS COMMENT FT1
inpatient medicine doctor and infectious disease doctor at bedside. pt resting in bed, status unchanged. awaiting inpatient bed assignment.

## 2024-01-31 NOTE — CHART NOTE - NSCHARTNOTEFT_GEN_A_CORE
RRT called for hypotension   1 L bolus given; see RRT note for full detail   BP improved   Discussed with medical attending Dr. Phillips; will reconsult MICU if patient's pressure continue to drop     Maribel Bliss PA-C  Dept of Medicine

## 2024-02-01 DIAGNOSIS — R78.81 BACTEREMIA: ICD-10-CM

## 2024-02-01 LAB
ANION GAP SERPL CALC-SCNC: 17 MMOL/L — SIGNIFICANT CHANGE UP (ref 5–17)
ANION GAP SERPL CALC-SCNC: 7 MMOL/L — SIGNIFICANT CHANGE UP (ref 5–17)
ANION GAP SERPL CALC-SCNC: 9 MMOL/L — SIGNIFICANT CHANGE UP (ref 5–17)
BUN SERPL-MCNC: 23 MG/DL — SIGNIFICANT CHANGE UP (ref 7–23)
BUN SERPL-MCNC: 26 MG/DL — HIGH (ref 7–23)
BUN SERPL-MCNC: 26 MG/DL — HIGH (ref 7–23)
CA-I BLD-SCNC: 1.13 MMOL/L — LOW (ref 1.15–1.33)
CALCIUM SERPL-MCNC: 6.6 MG/DL — LOW (ref 8.4–10.5)
CALCIUM SERPL-MCNC: 7.5 MG/DL — LOW (ref 8.4–10.5)
CALCIUM SERPL-MCNC: 7.7 MG/DL — LOW (ref 8.4–10.5)
CHLORIDE SERPL-SCNC: 102 MMOL/L — SIGNIFICANT CHANGE UP (ref 96–108)
CHLORIDE SERPL-SCNC: 113 MMOL/L — HIGH (ref 96–108)
CHLORIDE SERPL-SCNC: 115 MMOL/L — HIGH (ref 96–108)
CO2 SERPL-SCNC: 21 MMOL/L — LOW (ref 22–31)
CO2 SERPL-SCNC: 22 MMOL/L — SIGNIFICANT CHANGE UP (ref 22–31)
CO2 SERPL-SCNC: 24 MMOL/L — SIGNIFICANT CHANGE UP (ref 22–31)
CREAT SERPL-MCNC: 0.31 MG/DL — LOW (ref 0.5–1.3)
CREAT SERPL-MCNC: 0.39 MG/DL — LOW (ref 0.5–1.3)
CREAT SERPL-MCNC: 0.41 MG/DL — LOW (ref 0.5–1.3)
CULTURE RESULTS: SIGNIFICANT CHANGE UP
EGFR: 106 ML/MIN/1.73M2 — SIGNIFICANT CHANGE UP
EGFR: 107 ML/MIN/1.73M2 — SIGNIFICANT CHANGE UP
EGFR: 113 ML/MIN/1.73M2 — SIGNIFICANT CHANGE UP
GLUCOSE BLDC GLUCOMTR-MCNC: 123 MG/DL — HIGH (ref 70–99)
GLUCOSE BLDC GLUCOMTR-MCNC: 131 MG/DL — HIGH (ref 70–99)
GLUCOSE BLDC GLUCOMTR-MCNC: 148 MG/DL — HIGH (ref 70–99)
GLUCOSE BLDC GLUCOMTR-MCNC: 198 MG/DL — HIGH (ref 70–99)
GLUCOSE BLDC GLUCOMTR-MCNC: 224 MG/DL — HIGH (ref 70–99)
GLUCOSE SERPL-MCNC: 126 MG/DL — HIGH (ref 70–99)
GLUCOSE SERPL-MCNC: 150 MG/DL — HIGH (ref 70–99)
GLUCOSE SERPL-MCNC: 511 MG/DL — CRITICAL HIGH (ref 70–99)
HCT VFR BLD CALC: 30.9 % — LOW (ref 34.5–45)
HGB BLD-MCNC: 9.2 G/DL — LOW (ref 11.5–15.5)
MAGNESIUM SERPL-MCNC: 2.1 MG/DL — SIGNIFICANT CHANGE UP (ref 1.6–2.6)
MCHC RBC-ENTMCNC: 26.9 PG — LOW (ref 27–34)
MCHC RBC-ENTMCNC: 29.8 GM/DL — LOW (ref 32–36)
MCV RBC AUTO: 90.4 FL — SIGNIFICANT CHANGE UP (ref 80–100)
NRBC # BLD: 0 /100 WBCS — SIGNIFICANT CHANGE UP (ref 0–0)
PHOSPHATE SERPL-MCNC: 2.2 MG/DL — LOW (ref 2.5–4.5)
PLATELET # BLD AUTO: 168 K/UL — SIGNIFICANT CHANGE UP (ref 150–400)
POTASSIUM SERPL-MCNC: 3 MMOL/L — LOW (ref 3.5–5.3)
POTASSIUM SERPL-MCNC: 3.4 MMOL/L — LOW (ref 3.5–5.3)
POTASSIUM SERPL-MCNC: 3.9 MMOL/L — SIGNIFICANT CHANGE UP (ref 3.5–5.3)
POTASSIUM SERPL-SCNC: 3 MMOL/L — LOW (ref 3.5–5.3)
POTASSIUM SERPL-SCNC: 3.4 MMOL/L — LOW (ref 3.5–5.3)
POTASSIUM SERPL-SCNC: 3.9 MMOL/L — SIGNIFICANT CHANGE UP (ref 3.5–5.3)
RBC # BLD: 3.42 M/UL — LOW (ref 3.8–5.2)
RBC # FLD: 16.8 % — HIGH (ref 10.3–14.5)
SODIUM SERPL-SCNC: 140 MMOL/L — SIGNIFICANT CHANGE UP (ref 135–145)
SODIUM SERPL-SCNC: 142 MMOL/L — SIGNIFICANT CHANGE UP (ref 135–145)
SODIUM SERPL-SCNC: 148 MMOL/L — HIGH (ref 135–145)
SPECIMEN SOURCE: SIGNIFICANT CHANGE UP
WBC # BLD: 14.23 K/UL — HIGH (ref 3.8–10.5)
WBC # FLD AUTO: 14.23 K/UL — HIGH (ref 3.8–10.5)

## 2024-02-01 PROCEDURE — 76700 US EXAM ABDOM COMPLETE: CPT | Mod: 26

## 2024-02-01 PROCEDURE — 99497 ADVNCD CARE PLAN 30 MIN: CPT | Mod: 25

## 2024-02-01 PROCEDURE — 99233 SBSQ HOSP IP/OBS HIGH 50: CPT

## 2024-02-01 PROCEDURE — 72149 MRI LUMBAR SPINE W/DYE: CPT | Mod: 26

## 2024-02-01 PROCEDURE — 93010 ELECTROCARDIOGRAM REPORT: CPT

## 2024-02-01 RX ORDER — DEXTROSE 50 % IN WATER 50 %
25 SYRINGE (ML) INTRAVENOUS ONCE
Refills: 0 | Status: DISCONTINUED | OUTPATIENT
Start: 2024-02-01 | End: 2024-02-03

## 2024-02-01 RX ORDER — SODIUM CHLORIDE 9 MG/ML
1000 INJECTION, SOLUTION INTRAVENOUS
Refills: 0 | Status: DISCONTINUED | OUTPATIENT
Start: 2024-02-01 | End: 2024-02-03

## 2024-02-01 RX ORDER — CHLORHEXIDINE GLUCONATE 213 G/1000ML
1 SOLUTION TOPICAL
Refills: 0 | Status: DISCONTINUED | OUTPATIENT
Start: 2024-02-01 | End: 2024-02-20

## 2024-02-01 RX ORDER — SODIUM HYPOCHLORITE 0.125 %
1 SOLUTION, NON-ORAL MISCELLANEOUS EVERY 12 HOURS
Refills: 0 | Status: DISCONTINUED | OUTPATIENT
Start: 2024-02-01 | End: 2024-02-05

## 2024-02-01 RX ORDER — HYDROCORTISONE 20 MG
50 TABLET ORAL EVERY 12 HOURS
Refills: 0 | Status: DISCONTINUED | OUTPATIENT
Start: 2024-02-02 | End: 2024-02-05

## 2024-02-01 RX ORDER — SODIUM,POTASSIUM PHOSPHATES 278-250MG
1 POWDER IN PACKET (EA) ORAL THREE TIMES A DAY
Refills: 0 | Status: COMPLETED | OUTPATIENT
Start: 2024-02-01 | End: 2024-02-02

## 2024-02-01 RX ORDER — SODIUM HYPOCHLORITE 0.125 %
1 SOLUTION, NON-ORAL MISCELLANEOUS EVERY 12 HOURS
Refills: 0 | Status: DISCONTINUED | OUTPATIENT
Start: 2024-02-01 | End: 2024-02-01

## 2024-02-01 RX ORDER — CEFAZOLIN SODIUM 1 G
VIAL (EA) INJECTION
Refills: 0 | Status: DISCONTINUED | OUTPATIENT
Start: 2024-02-01 | End: 2024-02-05

## 2024-02-01 RX ORDER — DEXTROSE 50 % IN WATER 50 %
15 SYRINGE (ML) INTRAVENOUS ONCE
Refills: 0 | Status: DISCONTINUED | OUTPATIENT
Start: 2024-02-01 | End: 2024-02-03

## 2024-02-01 RX ORDER — CEFAZOLIN SODIUM 1 G
2000 VIAL (EA) INJECTION ONCE
Refills: 0 | Status: COMPLETED | OUTPATIENT
Start: 2024-02-01 | End: 2024-02-01

## 2024-02-01 RX ORDER — CEFAZOLIN SODIUM 1 G
2000 VIAL (EA) INJECTION EVERY 8 HOURS
Refills: 0 | Status: DISCONTINUED | OUTPATIENT
Start: 2024-02-01 | End: 2024-02-05

## 2024-02-01 RX ORDER — POTASSIUM CHLORIDE 20 MEQ
40 PACKET (EA) ORAL ONCE
Refills: 0 | Status: DISCONTINUED | OUTPATIENT
Start: 2024-02-01 | End: 2024-02-01

## 2024-02-01 RX ORDER — POTASSIUM CHLORIDE 20 MEQ
40 PACKET (EA) ORAL ONCE
Refills: 0 | Status: COMPLETED | OUTPATIENT
Start: 2024-02-01 | End: 2024-02-01

## 2024-02-01 RX ORDER — INSULIN LISPRO 100/ML
VIAL (ML) SUBCUTANEOUS EVERY 6 HOURS
Refills: 0 | Status: DISCONTINUED | OUTPATIENT
Start: 2024-02-01 | End: 2024-02-20

## 2024-02-01 RX ORDER — CALCIUM GLUCONATE 100 MG/ML
1 VIAL (ML) INTRAVENOUS ONCE
Refills: 0 | Status: COMPLETED | OUTPATIENT
Start: 2024-02-01 | End: 2024-02-01

## 2024-02-01 RX ORDER — GLUCAGON INJECTION, SOLUTION 0.5 MG/.1ML
1 INJECTION, SOLUTION SUBCUTANEOUS ONCE
Refills: 0 | Status: DISCONTINUED | OUTPATIENT
Start: 2024-02-01 | End: 2024-02-03

## 2024-02-01 RX ORDER — DEXTROSE 50 % IN WATER 50 %
12.5 SYRINGE (ML) INTRAVENOUS ONCE
Refills: 0 | Status: DISCONTINUED | OUTPATIENT
Start: 2024-02-01 | End: 2024-02-03

## 2024-02-01 RX ADMIN — Medication 40 MILLIEQUIVALENT(S): at 04:01

## 2024-02-01 RX ADMIN — Medication 100 MILLIGRAM(S): at 11:42

## 2024-02-01 RX ADMIN — Medication 80 MICROGRAM(S): at 03:38

## 2024-02-01 RX ADMIN — Medication 100 MILLIGRAM(S): at 14:30

## 2024-02-01 RX ADMIN — Medication 1 APPLICATION(S): at 17:00

## 2024-02-01 RX ADMIN — Medication 2: at 19:28

## 2024-02-01 RX ADMIN — Medication 500 MILLIGRAM(S): at 11:42

## 2024-02-01 RX ADMIN — PIPERACILLIN AND TAZOBACTAM 25 GRAM(S): 4; .5 INJECTION, POWDER, LYOPHILIZED, FOR SOLUTION INTRAVENOUS at 13:01

## 2024-02-01 RX ADMIN — Medication 1 MILLIGRAM(S): at 11:42

## 2024-02-01 RX ADMIN — SERTRALINE 25 MILLIGRAM(S): 25 TABLET, FILM COATED ORAL at 11:42

## 2024-02-01 RX ADMIN — Medication 50 MILLIGRAM(S): at 13:00

## 2024-02-01 RX ADMIN — Medication 1 PACKET(S): at 17:00

## 2024-02-01 RX ADMIN — Medication 1 APPLICATION(S): at 06:32

## 2024-02-01 RX ADMIN — MIDODRINE HYDROCHLORIDE 10 MILLIGRAM(S): 2.5 TABLET ORAL at 06:27

## 2024-02-01 RX ADMIN — MIDODRINE HYDROCHLORIDE 10 MILLIGRAM(S): 2.5 TABLET ORAL at 13:02

## 2024-02-01 RX ADMIN — PIPERACILLIN AND TAZOBACTAM 25 GRAM(S): 4; .5 INJECTION, POWDER, LYOPHILIZED, FOR SOLUTION INTRAVENOUS at 06:26

## 2024-02-01 RX ADMIN — Medication 80 MICROGRAM(S): at 21:25

## 2024-02-01 RX ADMIN — Medication 100 GRAM(S): at 03:54

## 2024-02-01 RX ADMIN — MIDODRINE HYDROCHLORIDE 10 MILLIGRAM(S): 2.5 TABLET ORAL at 21:25

## 2024-02-01 RX ADMIN — Medication 1 TABLET(S): at 11:42

## 2024-02-01 RX ADMIN — Medication 50 MILLIGRAM(S): at 06:24

## 2024-02-01 RX ADMIN — SODIUM CHLORIDE 125 MILLILITER(S): 9 INJECTION, SOLUTION INTRAVENOUS at 13:01

## 2024-02-01 RX ADMIN — Medication 100 MILLIGRAM(S): at 21:24

## 2024-02-01 RX ADMIN — TAMOXIFEN CITRATE 20 MILLIGRAM(S): 20 TABLET, FILM COATED ORAL at 11:42

## 2024-02-01 RX ADMIN — SENNA PLUS 2 TABLET(S): 8.6 TABLET ORAL at 21:25

## 2024-02-01 NOTE — DIETITIAN INITIAL EVALUATION ADULT - PROBLEM SELECTOR PLAN 4
- QTc of 561  - Calcium gluconate 2mg IVx1 given  - Optimize K, Mg, Ca  - Repeat EKG in AM  - Avoid QT prolonging agents

## 2024-02-01 NOTE — DIETITIAN INITIAL EVALUATION ADULT - PERTINENT MEDS FT
MEDICATIONS  (STANDING):  ascorbic acid 500 milliGRAM(s) Oral daily  collagenase Ointment 1 Application(s) Topical two times a day  Dakins Solution - 1/4 Strength 1 Application(s) Topical every 12 hours  dextrose 5%. 1000 milliLiter(s) (50 mL/Hr) IV Continuous <Continuous>  dextrose 5%. 1000 milliLiter(s) (100 mL/Hr) IV Continuous <Continuous>  dextrose 50% Injectable 12.5 Gram(s) IV Push once  dextrose 50% Injectable 25 Gram(s) IV Push once  dextrose 50% Injectable 25 Gram(s) IV Push once  folic acid 1 milliGRAM(s) Oral daily  glucagon  Injectable 1 milliGRAM(s) IntraMuscular once  hydrocortisone sodium succinate Injectable 50 milliGRAM(s) IV Push every 8 hours  insulin lispro (ADMELOG) corrective regimen sliding scale   SubCutaneous every 6 hours  levothyroxine Injectable 80 MICROGram(s) IV Push at bedtime  midodrine 10 milliGRAM(s) Oral every 8 hours  multivitamin 1 Tablet(s) Oral daily  piperacillin/tazobactam IVPB.. 3.375 Gram(s) IV Intermittent every 8 hours  polyethylene glycol 3350 17 Gram(s) Oral daily  senna 2 Tablet(s) Oral at bedtime  sertraline 25 milliGRAM(s) Oral daily  sodium chloride 0.45%. 1000 milliLiter(s) (125 mL/Hr) IV Continuous <Continuous>  tamoxifen 20 milliGRAM(s) Oral daily  thiamine 100 milliGRAM(s) Oral daily  vancomycin  IVPB 500 milliGRAM(s) IV Intermittent every 24 hours    MEDICATIONS  (PRN):  acetaminophen     Tablet .. 650 milliGRAM(s) Oral every 6 hours PRN Temp greater or equal to 38C (100.4F), Mild Pain (1 - 3)  dextrose Oral Gel 15 Gram(s) Oral once PRN Blood Glucose LESS THAN 70 milliGRAM(s)/deciliter  melatonin 3 milliGRAM(s) Oral at bedtime PRN Insomnia

## 2024-02-01 NOTE — DIETITIAN INITIAL EVALUATION ADULT - NSFNSGIASSESSMENTFT_GEN_A_CORE
Reports no N/V nor diarrhea. Noted constipation. Per chart, last BM not documented. Ordered for bowel regimen: miralax, senna.

## 2024-02-01 NOTE — DIETITIAN INITIAL EVALUATION ADULT - ENERGY INTAKE
- Pt with PEG receiving enteral nutrition with Jevity 1.2 @ goal rate of 50 mL/hr x 24 hrs; EN regimen at goal to provide 1200 mL total volume, 1440 kcal/day, g PRO/day (g PRO/kg), mL free water; based on dosing wt of 36.3 kg. Noted at time of RD visit, pt's TFs were held secondary to MRI. RN endorses pt tolerating feeds prior; plan for feeds to be reinitiated at goal rate when pt returns. Ordered for multivitamin, vitamin C, thiamine.

## 2024-02-01 NOTE — PROGRESS NOTE ADULT - CONVERSATION DETAILS
Patient is full code,  is hopeful that patient will return to her baseline prior to acute illness
Met with Dr. Arthur at bedside. He is familiar with myself and the role of palliative care. Medical updates provided by primary team. Discussed guarded prognosis in the setting of poor functional status, severe pressure injuries/wounds, and recurrent infection. Dr. Arthur feels the care at the LTC facility is what led to the readmission. 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   We discussed advanced directives including CPR and intubation and the limited benefit and harm they may cause in Yelitza's situation. At this time Dr. Arthur feels that Yelitza should remain full code and all aggressive interventions should be pursued. His goal is to have her return to a LTC facility when she is medically able. Emotional support provided.

## 2024-02-01 NOTE — DIETITIAN INITIAL EVALUATION ADULT - ORAL INTAKE PTA/DIET HISTORY
Utilized chart and previous RD notes for subjective dietary hx; noted pt with recent admission and dx with severe protein calorie malnutrition (12/21/23). Per chart, pt lives at [facility]. S/p PEG placement (1/11/24) secondary to hx of dysphagia.  Utilized chart and previous RD notes for subjective dietary hx; noted pt with recent admission and dx with severe protein calorie malnutrition (12/21/23). Per chart, pt lives at [facility]. S/p PEG placement (1/11/24) secondary to hx of dysphagia. Per last RD assessment (12/21/23), pt confirms no known food allergies/intolerances; endorses following Kosher diet restrictions. Takes micronutrient supplementation: multivitamin, folic acid.  Utilized chart, NH paper chart, previous RD notes for subjective dietary hx; noted pt with recent admission and dx with severe protein calorie malnutrition (12/21/23). Per chart, pt lives at [facility]. S/p PEG placement (1/11/24) secondary to hx of dysphagia. Per NH chart, pt was receiving Glucerna 1.5 @ 50 mL/hr x 18 hrs with 2 packets of Liquid Protein Supplementation (per serving provides 15 g PRO, 100 kcal); EN regimen with modular components at goal to provide: 900 mL total volume, 1550 kcal/day (37 Kcal/kg), 104.4 g PRO/day (2.5 g PRO/kg), 683 mL free water/day; based on NH weight: 41.7 kg. Per last RD assessment (12/21/23), pt confirms no known food allergies/intolerances; endorses following Kosher diet restrictions. Takes micronutrient supplementation: multivitamin, vitamin C, folic acid, thiamine, zinc.

## 2024-02-01 NOTE — PROGRESS NOTE ADULT - ASSESSMENT
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 Admitted for sepsis likely 2/2 to wound infection, complicated by bacteremia

## 2024-02-01 NOTE — PROGRESS NOTE ADULT - SUBJECTIVE AND OBJECTIVE BOX
Follow Up:  sepsis    Interval History/ROS: pt afebrile, blood cx back with MSSA    ROS:    Unobtainable because: non verbal        Allergies  Tapazole (Hives)        ANTIMICROBIALS:  piperacillin/tazobactam IVPB.. 3.375 every 8 hours  vancomycin  IVPB 500 every 24 hours      OTHER MEDS:  acetaminophen     Tablet .. 650 milliGRAM(s) Oral every 6 hours PRN  ascorbic acid 500 milliGRAM(s) Oral daily  chlorhexidine 2% Cloths 1 Application(s) Topical <User Schedule>  collagenase Ointment 1 Application(s) Topical two times a day  Dakins Solution - 1/4 Strength 1 Application(s) Topical every 12 hours  dextrose 5%. 1000 milliLiter(s) IV Continuous <Continuous>  dextrose 5%. 1000 milliLiter(s) IV Continuous <Continuous>  dextrose 50% Injectable 12.5 Gram(s) IV Push once  dextrose 50% Injectable 25 Gram(s) IV Push once  dextrose 50% Injectable 25 Gram(s) IV Push once  dextrose Oral Gel 15 Gram(s) Oral once PRN  folic acid 1 milliGRAM(s) Oral daily  glucagon  Injectable 1 milliGRAM(s) IntraMuscular once  hydrocortisone sodium succinate Injectable 50 milliGRAM(s) IV Push every 8 hours  insulin lispro (ADMELOG) corrective regimen sliding scale   SubCutaneous every 6 hours  levothyroxine Injectable 80 MICROGram(s) IV Push at bedtime  melatonin 3 milliGRAM(s) Oral at bedtime PRN  midodrine 10 milliGRAM(s) Oral every 8 hours  multivitamin 1 Tablet(s) Oral daily  polyethylene glycol 3350 17 Gram(s) Oral daily  senna 2 Tablet(s) Oral at bedtime  sertraline 25 milliGRAM(s) Oral daily  sodium chloride 0.45%. 1000 milliLiter(s) IV Continuous <Continuous>  tamoxifen 20 milliGRAM(s) Oral daily  thiamine 100 milliGRAM(s) Oral daily      Vital Signs Last 24 Hrs  T(C): 36.5 (01 Feb 2024 11:47), Max: 36.6 (31 Jan 2024 21:40)  T(F): 97.7 (01 Feb 2024 11:47), Max: 97.8 (31 Jan 2024 21:40)  HR: 73 (01 Feb 2024 11:47) (57 - 73)  BP: 88/56 (01 Feb 2024 11:47) (82/49 - 103/57)  BP(mean): --  RR: 18 (01 Feb 2024 11:47) (15 - 18)  SpO2: 95% (01 Feb 2024 11:47) (95% - 99%)    Parameters below as of 01 Feb 2024 11:47  Patient On (Oxygen Delivery Method): nasal cannula  O2 Flow (L/min): 2      Physical Exam:  General:    cachectic  Respiratory:   comfortable on RA  abd:   soft, PEG with brownish drainage around it, not tender  :   no suprapubic tenderness, + avila  Musculoskeletal : contracted, no joint swelling, no edema  Skin: multiple wounds including sacral with necrotic edge, back with linear eschar  vascular: no phlebitis                            9.2    14.23 )-----------( 168      ( 01 Feb 2024 13:20 )             30.9       02-01    142  |  113<H>  |  26<H>  ----------------------------<  150<H>  3.9   |  22  |  0.41<L>    Ca    7.5<L>      01 Feb 2024 13:20  Phos  2.2     02-01  Mg     2.1     02-01    TPro  5.0<L>  /  Alb  2.4<L>  /  TBili  0.6  /  DBili  x   /  AST  37  /  ALT  60<H>  /  AlkPhos  49  01-31      Urinalysis Basic - ( 01 Feb 2024 13:20 )    Color: x / Appearance: x / SG: x / pH: x  Gluc: 150 mg/dL / Ketone: x  / Bili: x / Urobili: x   Blood: x / Protein: x / Nitrite: x   Leuk Esterase: x / RBC: x / WBC x   Sq Epi: x / Non Sq Epi: x / Bacteria: x        MICROBIOLOGY:  v  Clean Catch Clean Catch (Midstream)  01-30-24   <10,000 CFU/mL Normal Urogenital Mireya  --  --      .Blood Blood-Peripheral  01-30-24   Growth in aerobic and anaerobic bottles: Staphylococcus aureus  Direct identification is available within approximately 3-5  hours either by Blood Panel Multiplexed PCR or Direct  MALDI-TOF. Details: [email or web address]  --  Blood Culture PCR      .Blood Blood-Peripheral  01-30-24   No growth at 24 hours  --  --      Catheterized Catheterized  01-18-24   <10,000 CFU/mL Normal Urogenital Mireya  --  --                RADIOLOGY:  Images independently visualized and reviewed personally, findings as below  < from: US Abdomen Complete (US Abdomen Complete .) (02.01.24 @ 10:13) >    IMPRESSION:    Hepatic steatosis. No focal hepatic lesion.  Cholelithiasis without evidence of cholecystitis.  1.5 cm echogenic lesion in the posterior right renal cortex may be   minimally increased in size compared to prior ultrasound performed   4/21/2021, and corresponds to enhancing lesion noted on CT. Findings   suggest angiomyolipoma. Correlation with MRI can be performed for further   evaluation.      < end of copied text >  < from: CT Abdomen and Pelvis w/ IV Cont (01.30.24 @ 18:21) >  IMPRESSION:  No pulmonary embolism in the main, left and right, segmental and proximal   subsegmental pulmonary arteries. Limited evaluation of the distal   pulmonary arteries due to patient motion.    Degenerative changes. Sacral decubitus ulcer to the level of the sacrum,   with questionable cortical changes of the bony sacrum may represent   osteomyelitis.    < end of copied text >

## 2024-02-01 NOTE — DIETITIAN INITIAL EVALUATION ADULT - OTHER INFO
- Hx of Breast Ca.  - P/w septic shock secondary to osteomyelitis; ordered for abx, Solu-cortef.   - IVF: 1/2 NS @ 125 mL/hr. Noted hypernatremia (2/1) and hypokalemia (2/1). Pt at risk for refeeding syndrome secondary to recent significant weight loss, BMI; Trend K+, Mg, Phos and replete as able.     Endo:  - No hx of DM. HgbA1C of 5.8% (1/31) reflects good glycemic control in consideration of age. POCTs x 24 hrs: 101 - 131 mg/dL WNL. Ordered for ADMELOG SSI. RD will continue to monitor blood glucose levels prn.   - Hypothyroidism; ordered for IV synthroid.     Wt Hx:  - Reported UBW of 52.3 kg x 4 mos. Per chart, dosing wt of 36.3 kg (1/30).   - Wt hx in kg ([facility]) as follows: 42.2 (1/12, bedscale), 40.8 (1/11), 40.5 (12/20/23, dosing), 54.5 (10/26/23), 54.5 (10/13), 50 (9/18/23), 54.1 (3/17/23). Suspected wt loss of x months (clinically significant, based on wts from). RD will continue to monitor weight trends as available/able.   - IBW: 52.3 kg (based on ht of 63 in)  - Hx of Breast Ca.  - P/w septic shock secondary to osteomyelitis; ordered for abx, Solu-cortef.   - IVF: 1/2 NS @ 125 mL/hr. Noted hypernatremia (2/1) and hypokalemia (2/1). Pt at risk for refeeding syndrome secondary to recent significant weight loss, BMI; Trend K+, Mg, Phos and replete as able.     Endo:  - No hx of DM. HgbA1C of 5.8% (1/31) reflects good glycemic control in consideration of age. POCTs x 24 hrs: 101 - 131 mg/dL WNL. Ordered for ADMELOG SSI. RD will continue to monitor blood glucose levels prn.   - Hypothyroidism; ordered for IV synthroid.     Wt Hx:  - Reported UBW of 52.3 kg x 4 mos. Per chart, dosing wt of 36.3 kg (1/30).   - Wt hx in kg ([facility]) as follows: 42.2 (1/12, bedscale), 40.8 (1/11), 40.5 (12/20/23, dosing), 54.5 (10/26/23), 54.5 (10/13), 50 (9/18/23), 54.1 (3/17/23). Suspected wt loss of x months (clinically significant, based on wts from).  RD will continue to monitor weight trends as available/able.   - IBW: 52.3 kg (based on ht of 63 in)  - Hx of Breast Ca.  - P/w sepsis secondary to sacral wounds versus osteomyelitis; ordered for abx, Solu-cortef.   - IVF: 1/2 NS @ 125 mL/hr. Noted hypernatremia (2/1) and hypokalemia (2/1). Pt at risk for refeeding syndrome secondary to recent significant weight loss, BMI; Trend K+, Mg, Phos and replete as able.     Endo:  - No hx of DM. HgbA1C of 5.8% (1/31) reflects good glycemic control in consideration of age. POCTs x 24 hrs: 101 - 131 mg/dL WNL. Ordered for ADMELOG SSI. RD will continue to monitor blood glucose levels prn.   - Hypothyroidism; ordered for IV synthroid.     Wt Hx:  - Per previous RD notes, pt's UBW of 52.3 kg x 4 mos. Per chart, dosing wt of 36.3 kg (1/30, wt calculator) - ? complete accuracy. Per NH chart, documented wt of 41.7 kg (1/30 - unsure method obtained).  - [facility] as follows: 42.2 (1/12, bedscale), 40.8 (1/11), 40.5 (12/20/23, dosing), 54.5 (10/26/23), 54.5 (10/13), 50 (9/18/23), 54.1 (3/17/23). Suspected wt loss of ~23% x 3 months (clinically significant, based on wts from 10/26/23 and 1/30/23).  RD will continue to monitor weight trends as available/able.   - IBW: 52.3 kg (based on ht of 63 in)

## 2024-02-01 NOTE — DIETITIAN INITIAL EVALUATION ADULT - REASON INDICATOR FOR ASSESSMENT
RD Consult Indicated for: Pressure Injury Stage 2 or >; Tube Feeding   Source: Team, Electronic Medical Record; Unable to interview pt at this time secondary to non-verbal at baseline.   Chart reviewed, events noted.

## 2024-02-01 NOTE — DIETITIAN INITIAL EVALUATION ADULT - OTHER CALCULATIONS
Used IBW of 52.3 kg for caloric/protein needs in consideration of pressure injuries, advanced age, malnutrition, BMI = 14.2.  Defer fluid needs to team.  Used current dosing wt of 36.3 kg (1/30) for caloric/protein needs in consideration of pressure injuries, advanced age, malnutrition, BMI = 14.2.  Defer fluid needs to team.  Used documented NH weight of 41.7 kg (1/30) for caloric/protein needs in consideration of multiple pressure injuries, advanced age, malnutrition, BMI <19.  Defer fluid needs to team.

## 2024-02-01 NOTE — PROGRESS NOTE ADULT - ASSESSMENT
68 f with  TBI, SAH, non-verbal at baseline, CVA, sacral ulcer, graves, L breast ca s/p mastectomy on tamoxifen, aspiration s/p PEG, multiple decubiti and sacral osteo, sent from NH for  multiple days of less responsiveness and fever, here febrile to 103.4, tachycardic, hypotensive to 70s presents from [facility] due to multiple days of reduced responsiveness and fever. Found to have leukocytosis, tachycardia and hypotension here as well. Difficult to assess, contracted and non-verbal.  has multiple wound with necrotic edge  WBC: 14, Na: 151, u/a with 17 WBC  CT: no PE, sacral decub to the level of sacrum and ?osteo    fever, tachycardia, hypotension, leukocytosis, transaminitis, hypernatremia septic shock, due to MSSA bacteremia, ? wound related   has multiple wound which have slough but no jim purulent drainage and the sacral osteo is chronic    * discontinue vanco and zosyn  * start cefazolin 2 q 8  * repeat blood cx q 48 until clear  * echo  * follow with wound care   * monitor CBC/diff and CMP    The above assessment and plan was discussed with the primary team    Katherin Teixeira MD  contact on teams  After 5pm and on weekends call [phone]

## 2024-02-01 NOTE — DIETITIAN INITIAL EVALUATION ADULT - PROBLEM SELECTOR PLAN 1
Sepsis 2/2 osteomyelitis  - Pt received Vanc/Zosyn in ED and is septic so will continue empiric abx prior to bone biopsy in setting of hemodynamic instability  - F/u blood cx  - Please consult ID in AM  - Wound care consult  - Palliative consult, per  pt is FULL CODE  - Given 3L of NS, Hydrocortisone 50mg IVx1 and midodrine 20mg x1  - In setting of hypernatremia, will give 0.45% Saline @125

## 2024-02-01 NOTE — DIETITIAN INITIAL EVALUATION ADULT - NSFNSGIIOFT_GEN_A_CORE
01-31-24 @ 07:01  -  02-01-24 @ 07:00  --------------------------------------------------------  OUT:  Total OUT: 0 mL    Total NET: 260 mL       Used documented NH wt of 41.7 kg (1/30) for anthropometrics above; RD will continue to update as weights become available.     01-31-24 @ 07:01  -  02-01-24 @ 07:00  --------------------------------------------------------  OUT:  Total OUT: 0 mL    Total NET: 260 mL

## 2024-02-01 NOTE — DIETITIAN INITIAL EVALUATION ADULT - NUTRITION DIAGNOSITC TERMINOLOGY #1
(1) minimal assist, teach one side; mother does other, staff holds
(1) minimal assist, teach one side; mother does other, staff holds
Underweight/Malnutrition...

## 2024-02-01 NOTE — PROGRESS NOTE ADULT - SUBJECTIVE AND OBJECTIVE BOX
SUBJECTIVE AND OBJECTIVE: Pt seen and examined at bedside. Unable to participate in exam.   Indication for Geriatrics and Palliative Care Services/INTERVAL HPI: GOC     OVERNIGHT EVENTS: No acute events o/n     DNR on chart:  Allergies    Tapazole (Hives)    Intolerances    MEDICATIONS  (STANDING):  ascorbic acid 500 milliGRAM(s) Oral daily  chlorhexidine 2% Cloths 1 Application(s) Topical <User Schedule>  collagenase Ointment 1 Application(s) Topical two times a day  Dakins Solution - 1/4 Strength 1 Application(s) Topical every 12 hours  dextrose 5%. 1000 milliLiter(s) (100 mL/Hr) IV Continuous <Continuous>  dextrose 5%. 1000 milliLiter(s) (50 mL/Hr) IV Continuous <Continuous>  dextrose 50% Injectable 12.5 Gram(s) IV Push once  dextrose 50% Injectable 25 Gram(s) IV Push once  dextrose 50% Injectable 25 Gram(s) IV Push once  folic acid 1 milliGRAM(s) Oral daily  glucagon  Injectable 1 milliGRAM(s) IntraMuscular once  hydrocortisone sodium succinate Injectable 50 milliGRAM(s) IV Push every 8 hours  insulin lispro (ADMELOG) corrective regimen sliding scale   SubCutaneous every 6 hours  levothyroxine Injectable 80 MICROGram(s) IV Push at bedtime  midodrine 10 milliGRAM(s) Oral every 8 hours  multivitamin 1 Tablet(s) Oral daily  piperacillin/tazobactam IVPB.. 3.375 Gram(s) IV Intermittent every 8 hours  polyethylene glycol 3350 17 Gram(s) Oral daily  senna 2 Tablet(s) Oral at bedtime  sertraline 25 milliGRAM(s) Oral daily  sodium chloride 0.45%. 1000 milliLiter(s) (125 mL/Hr) IV Continuous <Continuous>  tamoxifen 20 milliGRAM(s) Oral daily  thiamine 100 milliGRAM(s) Oral daily  vancomycin  IVPB 500 milliGRAM(s) IV Intermittent every 24 hours    MEDICATIONS  (PRN):  acetaminophen     Tablet .. 650 milliGRAM(s) Oral every 6 hours PRN Temp greater or equal to 38C (100.4F), Mild Pain (1 - 3)  dextrose Oral Gel 15 Gram(s) Oral once PRN Blood Glucose LESS THAN 70 milliGRAM(s)/deciliter  melatonin 3 milliGRAM(s) Oral at bedtime PRN Insomnia      ITEMS UNCHECKED ARE NOT PRESENT    PRESENT SYMPTOMS: [x ]Unable to self-report - see [ ] CPOT [ x] PAINADS [ x] RDOS  Source if other than patient:  [ ]Family   [ x]Team     Pain:  [ ]yes [ ]no  QOL impact -   Location -                    Aggravating factors -  Quality -  Radiation -  Timing-  Severity (0-10 scale):  Minimal acceptable level (0-10 scale):     CPOT:    https://www.[facility].org/getattachment/[email or web address]/Critical-Care-Pain-Observation-Tool-(CPOT)    PAINAD Score: See PAINAD tool and score below       Dyspnea:                           [ ]Mild [ ]Moderate [ ]Severe    RDOS: See RDOS tool and score below   0 to 2  minimal or no respiratory distress   3  mild distress  4 to 6 moderate distress  >7 severe distress      Anxiety:                             [ ]Mild [ ]Moderate [ ]Severe  Fatigue:                             [ ]Mild [ ]Moderate [ ]Severe  Nausea:                             [ ]Mild [ ]Moderate [ ]Severe  Loss of appetite:              [ ]Mild [ ]Moderate [ ]Severe  Constipation:                    [ ]Mild [ ]Moderate [ ]Severe    PCSSQ[Palliative Care Spiritual Screening Question]   Severity (0-10):  Score of 4 or > indicate consideration of Chaplaincy referral.  Chaplaincy Referral: [ ] yes [ ] refused [ ] following [ ] Deferred     Caregiver [place]? : [ ] yes [ ] no [ ] Deferred [ ] Declined             Social work referral [ ] Patient & Family Centered Care Referral [ ]     Anticipatory Grief present?:  [ ] yes [ ] no  [ ] Deferred                  Social work referral [ ] Chaplaincy Referral [ ]    		  Other Symptoms:  [x ]All other review of systems negative- unable to participate due to poor mentation     Palliative Performance Status Version 2:   See PPSv2 tool and score below         PHYSICAL EXAM:  Vital Signs Last 24 Hrs  T(C): 36.5 (01 Feb 2024 11:47), Max: 36.6 (31 Jan 2024 21:40)  T(F): 97.7 (01 Feb 2024 11:47), Max: 97.8 (31 Jan 2024 21:40)  HR: 73 (01 Feb 2024 11:47) (57 - 73)  BP: 88/56 (01 Feb 2024 11:47) (81/57 - 103/57)  BP(mean): --  RR: 18 (01 Feb 2024 11:47) (15 - 18)  SpO2: 95% (01 Feb 2024 11:47) (95% - 99%)    Parameters below as of 01 Feb 2024 11:47  Patient On (Oxygen Delivery Method): nasal cannula  O2 Flow (L/min): 2   I&O's Summary    31 Jan 2024 07:01  -  01 Feb 2024 07:00  --------------------------------------------------------  IN: 1347.5 mL / OUT: 650 mL / NET: 697.5 mL       GENERAL: [ ]Cachexia    [ ]Alert  [ ]Oriented x   [x ]Lethargic  [ ]Unarousable  [ ]Verbal  [ x]Non-Verbal  Behavioral:   [ ]Anxiety  [ ]Delirium [ ]Agitation [ ]Other  HEENT:  [ ]Normal   [ x]Dry mouth   [ ]ET Tube/Trach  [ ]Oral lesions  PULMONARY:   [ ]Clear [ ]Tachypnea  [ ]Audible excessive secretions   [ ]Rhonchi        [ ]Right [ ]Left [ ]Bilateral  [ ]Crackles        [ ]Right [ ]Left [ ]Bilateral  [ ]Wheezing     [ ]Right [ ]Left [ ]Bilateral  [x ]Diminished BS [ ] Right [ ]Left [x ]Bilateral  CARDIOVASCULAR:    [x ]Regular [ ]Irregular [ ]Tachy  [ ]James [ ]Murmur [ ]Other  GASTROINTESTINAL:  [x ]Soft  [ ]Distended   [ x]+BS  [ x]Non tender [ ]Tender  [ ]Other [ x]PEG [ ]OGT/ NGT   Last BM:   GENITOURINARY:  [ ]Normal [ x]Incontinent   [ ]Oliguria/Anuria   [ ]Spangler  MUSCULOSKELETAL:   [ ]Normal   [x ]Weakness  [ x]Bed/Wheelchair bound [ ]Edema  NEUROLOGIC:   [ ]No focal deficits  [ x] Cognitive impairment  [ ] Dysphagia [ ]Dysarthria [ ] Paresis [ ]Other   SKIN:   [ ]Normal  [ ]Rash  [ ]Other  [x ]Pressure ulcer(s) [ x]y [ ]n present on admission    CRITICAL CARE:  [ ]Shock Present  [ ]Septic [ ]Cardiogenic [ ]Neurologic [ ]Hypovolemic  [ ]Vasopressors [ ]Inotropes  [ ]Respiratory failure present [ ]Mechanical Ventilation [ ]Non-invasive ventilatory support [ ]High-Flow   [ ]Acute  [ ]Chronic [ ]Hypoxic  [ ]Hypercarbic [ ]Other  [ ]Other organ failure     LABS:                        9.2    11.65 )-----------( 136      ( 31 Jan 2024 06:48 )             32.8   02-01    148<H>  |  115<H>  |  26<H>  ----------------------------<  126<H>  3.4<L>   |  24  |  0.39<L>    Ca    7.7<L>      01 Feb 2024 02:22  Mg     2.0     01-31    TPro  5.0<L>  /  Alb  2.4<L>  /  TBili  0.6  /  DBili  x   /  AST  37  /  ALT  60<H>  /  AlkPhos  49  01-31  PT/INR - ( 30 Jan 2024 16:47 )   PT: 17.8 sec;   INR: 1.72 ratio         PTT - ( 30 Jan 2024 16:47 )  PTT:29.6 sec    Urinalysis Basic - ( 01 Feb 2024 02:22 )    Color: x / Appearance: x / SG: x / pH: x  Gluc: 126 mg/dL / Ketone: x  / Bili: x / Urobili: x   Blood: x / Protein: x / Nitrite: x   Leuk Esterase: x / RBC: x / WBC x   Sq Epi: x / Non Sq Epi: x / Bacteria: x      RADIOLOGY & ADDITIONAL STUDIES:  < from: US Abdomen Complete (US Abdomen Complete .) (02.01.24 @ 10:13) >    ACC: [id number] EXAM:  US ABDOMEN COMPLETE   ORDERED BY: URSZULA PISANO     PROCEDURE DATE:  02/01/2024          INTERPRETATION:  CLINICAL INFORMATION: Elevated liver function tests    COMPARISON: CT abdomen pelvis 1/30/2024 ultrasound abdomen 5/7/2021, and   4/21/2021    TECHNIQUE: Sonography of the abdomen.    FINDINGS:  Liver: Mildly increased in size measuring 18.5 cm. Increased parenchymal   echogenicity suggestive of steatosis.  Bile ducts: Normal caliber. Common bile duct measures 0.3 cm.  Gallbladder: Intraluminal sludge and gallstones, no pericholecystic   fluid, positive sonographic Tobar sign was not elicited.  Pancreas: Visualized portions are within normal limits.  Spleen: 7.7 cm. Within normal limits.  Right kidney: 9.2. No hydronephrosis. 1.5 x 1.2 cm circumscribed   echogenic posterior cortical lesion, which corresponds to enhancing   lesion noted on CT scan and may be minimally increased in size compared   to prior ultrasound 4/21/2021  Left kidney: 10.8. No hydronephrosis.  Ascites: None.  Aorta and IVC: Visualized portions are within normal limits.    IMPRESSION:    Hepatic steatosis. No focal hepatic lesion.  Cholelithiasis without evidence of cholecystitis.  1.5 cm echogenic lesion in the posterior right renal cortex may be   minimally increased in size compared to prior ultrasound performed   4/21/2021, and corresponds to enhancing lesion noted on CT. Findings   suggest angiomyolipoma. Correlation with MRI can be performed for further   evaluation.    --- End of Report ---          RAHUL LAZARUS MD; Fellow Radiology  This document has been electronically signed.  HALLEY SHAFFER MD; Attending Radiologist  This document has been electronically signed. Feb 1 2024 11:02AM    < end of copied text >    Protein Calorie Malnutrition Present: [ ]mild [ ]moderate [ ]severe [ ]underweight [ ]morbid obesity  https://www.andeal.org/vault/2440/web/files/ONC/Table_Clinical%20Characteristics%20to%20Document%20Malnutrition-White%20JV%20et%20al%202012.pdf    Height (cm): 160 (01-30-24 @ 16:10), 160 (01-11-24 @ 13:12), 167.6 (09-18-23 @ 20:11)  Weight (kg): 36.3 (01-30-24 @ 16:10), 40.8 (01-11-24 @ 13:12), 49.9 (09-18-23 @ 20:11)  BMI (kg/m2): 14.2 (01-30-24 @ 16:10), 15.9 (01-11-24 @ 13:12), 17.8 (09-18-23 @ 20:11)    [x ]PPSV2 < or = 30%  [ ]significant weight loss [ ]poor nutritional intake [ ]anasarca[ ]Artificial Nutrition    Other REFERRALS:  [ ]Hospice  [ ]Child Life  [ ]Social Work  [ ]Case management [ ]Holistic Therapy     Goals of Care Document: SUBJECTIVE AND OBJECTIVE: Pt seen and examined at bedside. Unable to participate in exam.   Indication for Geriatrics and Palliative Care Services/INTERVAL HPI: GOC     OVERNIGHT EVENTS: No acute events o/n     DNR on chart:  Allergies    Tapazole (Hives)    Intolerances    MEDICATIONS  (STANDING):  ascorbic acid 500 milliGRAM(s) Oral daily  chlorhexidine 2% Cloths 1 Application(s) Topical <User Schedule>  collagenase Ointment 1 Application(s) Topical two times a day  Dakins Solution - 1/4 Strength 1 Application(s) Topical every 12 hours  dextrose 5%. 1000 milliLiter(s) (100 mL/Hr) IV Continuous <Continuous>  dextrose 5%. 1000 milliLiter(s) (50 mL/Hr) IV Continuous <Continuous>  dextrose 50% Injectable 12.5 Gram(s) IV Push once  dextrose 50% Injectable 25 Gram(s) IV Push once  dextrose 50% Injectable 25 Gram(s) IV Push once  folic acid 1 milliGRAM(s) Oral daily  glucagon  Injectable 1 milliGRAM(s) IntraMuscular once  hydrocortisone sodium succinate Injectable 50 milliGRAM(s) IV Push every 8 hours  insulin lispro (ADMELOG) corrective regimen sliding scale   SubCutaneous every 6 hours  levothyroxine Injectable 80 MICROGram(s) IV Push at bedtime  midodrine 10 milliGRAM(s) Oral every 8 hours  multivitamin 1 Tablet(s) Oral daily  piperacillin/tazobactam IVPB.. 3.375 Gram(s) IV Intermittent every 8 hours  polyethylene glycol 3350 17 Gram(s) Oral daily  senna 2 Tablet(s) Oral at bedtime  sertraline 25 milliGRAM(s) Oral daily  sodium chloride 0.45%. 1000 milliLiter(s) (125 mL/Hr) IV Continuous <Continuous>  tamoxifen 20 milliGRAM(s) Oral daily  thiamine 100 milliGRAM(s) Oral daily  vancomycin  IVPB 500 milliGRAM(s) IV Intermittent every 24 hours    MEDICATIONS  (PRN):  acetaminophen     Tablet .. 650 milliGRAM(s) Oral every 6 hours PRN Temp greater or equal to 38C (100.4F), Mild Pain (1 - 3)  dextrose Oral Gel 15 Gram(s) Oral once PRN Blood Glucose LESS THAN 70 milliGRAM(s)/deciliter  melatonin 3 milliGRAM(s) Oral at bedtime PRN Insomnia      ITEMS UNCHECKED ARE NOT PRESENT    PRESENT SYMPTOMS: [x ]Unable to self-report - see [ ] CPOT [ x] PAINADS [ x] RDOS  Source if other than patient:  [ ]Family   [ x]Team     Pain:  [ ]yes [ ]no  QOL impact -   Location -                    Aggravating factors -  Quality -  Radiation -  Timing-  Severity (0-10 scale):  Minimal acceptable level (0-10 scale):     CPOT:    https://www.[facility].org/getattachment/[email or web address]/Critical-Care-Pain-Observation-Tool-(CPOT)    PAINAD Score: See PAINAD tool and score below       Dyspnea:                           [ ]Mild [ ]Moderate [ ]Severe    RDOS: See RDOS tool and score below   0 to 2  minimal or no respiratory distress   3  mild distress  4 to 6 moderate distress  >7 severe distress      Anxiety:                             [ ]Mild [ ]Moderate [ ]Severe  Fatigue:                             [ ]Mild [ ]Moderate [ ]Severe  Nausea:                             [ ]Mild [ ]Moderate [ ]Severe  Loss of appetite:              [ ]Mild [ ]Moderate [ ]Severe  Constipation:                    [ ]Mild [ ]Moderate [ ]Severe    PCSSQ[Palliative Care Spiritual Screening Question]   Severity (0-10):  Score of 4 or > indicate consideration of Chaplaincy referral.  Chaplaincy Referral: [ ] yes [ ] refused [ ] following [ x] Deferred     Caregiver [place]? : [ ] yes [ ] no [x ] Deferred [ ] Declined             Social work referral [ ] Patient & Family Centered Care Referral [ ]     Anticipatory Grief present?:  [ ] yes [ ] no  [ x] Deferred                  Social work referral [ ] Chaplaincy Referral [ ]    		  Other Symptoms:  [x ]All other review of systems negative- unable to participate due to poor mentation     Palliative Performance Status Version 2:   See PPSv2 tool and score below         PHYSICAL EXAM:  Vital Signs Last 24 Hrs  T(C): 36.5 (01 Feb 2024 11:47), Max: 36.6 (31 Jan 2024 21:40)  T(F): 97.7 (01 Feb 2024 11:47), Max: 97.8 (31 Jan 2024 21:40)  HR: 73 (01 Feb 2024 11:47) (57 - 73)  BP: 88/56 (01 Feb 2024 11:47) (81/57 - 103/57)  BP(mean): --  RR: 18 (01 Feb 2024 11:47) (15 - 18)  SpO2: 95% (01 Feb 2024 11:47) (95% - 99%)    Parameters below as of 01 Feb 2024 11:47  Patient On (Oxygen Delivery Method): nasal cannula  O2 Flow (L/min): 2   I&O's Summary    31 Jan 2024 07:01  -  01 Feb 2024 07:00  --------------------------------------------------------  IN: 1347.5 mL / OUT: 650 mL / NET: 697.5 mL       GENERAL: [ ]Cachexia    [ ]Alert  [ ]Oriented x   [x ]Lethargic  [ ]Unarousable  [ ]Verbal  [ x]Non-Verbal  Behavioral:   [ ]Anxiety  [ ]Delirium [ ]Agitation [ ]Other  HEENT:  [ ]Normal   [ x]Dry mouth   [ ]ET Tube/Trach  [ ]Oral lesions  PULMONARY:   [ ]Clear [ ]Tachypnea  [ ]Audible excessive secretions   [ ]Rhonchi        [ ]Right [ ]Left [ ]Bilateral  [ ]Crackles        [ ]Right [ ]Left [ ]Bilateral  [ ]Wheezing     [ ]Right [ ]Left [ ]Bilateral  [x ]Diminished BS [ ] Right [ ]Left [x ]Bilateral  CARDIOVASCULAR:    [x ]Regular [ ]Irregular [ ]Tachy  [ ]James [ ]Murmur [ ]Other  GASTROINTESTINAL:  [x ]Soft  [ ]Distended   [ x]+BS  [ x]Non tender [ ]Tender  [ ]Other [ x]PEG [ ]OGT/ NGT   Last BM:   GENITOURINARY:  [ ]Normal [ x]Incontinent   [ ]Oliguria/Anuria   [ ]Spangler  MUSCULOSKELETAL:   [ ]Normal   [x ]Weakness  [ x]Bed/Wheelchair bound [ ]Edema  NEUROLOGIC:   [ ]No focal deficits  [ x] Cognitive impairment  [ ] Dysphagia [ ]Dysarthria [ ] Paresis [ ]Other   SKIN:   [ ]Normal  [ ]Rash  [ ]Other  [x ]Pressure ulcer(s) [ x]y [ ]n present on admission    CRITICAL CARE:  [ ]Shock Present  [ ]Septic [ ]Cardiogenic [ ]Neurologic [ ]Hypovolemic  [ ]Vasopressors [ ]Inotropes  [ ]Respiratory failure present [ ]Mechanical Ventilation [ ]Non-invasive ventilatory support [ ]High-Flow   [ ]Acute  [ ]Chronic [ ]Hypoxic  [ ]Hypercarbic [ ]Other  [ ]Other organ failure     LABS:                        9.2    11.65 )-----------( 136      ( 31 Jan 2024 06:48 )             32.8   02-01    148<H>  |  115<H>  |  26<H>  ----------------------------<  126<H>  3.4<L>   |  24  |  0.39<L>    Ca    7.7<L>      01 Feb 2024 02:22  Mg     2.0     01-31    TPro  5.0<L>  /  Alb  2.4<L>  /  TBili  0.6  /  DBili  x   /  AST  37  /  ALT  60<H>  /  AlkPhos  49  01-31  PT/INR - ( 30 Jan 2024 16:47 )   PT: 17.8 sec;   INR: 1.72 ratio         PTT - ( 30 Jan 2024 16:47 )  PTT:29.6 sec    Urinalysis Basic - ( 01 Feb 2024 02:22 )    Color: x / Appearance: x / SG: x / pH: x  Gluc: 126 mg/dL / Ketone: x  / Bili: x / Urobili: x   Blood: x / Protein: x / Nitrite: x   Leuk Esterase: x / RBC: x / WBC x   Sq Epi: x / Non Sq Epi: x / Bacteria: x      RADIOLOGY & ADDITIONAL STUDIES:  < from: US Abdomen Complete (US Abdomen Complete .) (02.01.24 @ 10:13) >    ACC: [id number] EXAM:  US ABDOMEN COMPLETE   ORDERED BY: URSZULA PISANO     PROCEDURE DATE:  02/01/2024          INTERPRETATION:  CLINICAL INFORMATION: Elevated liver function tests    COMPARISON: CT abdomen pelvis 1/30/2024 ultrasound abdomen 5/7/2021, and   4/21/2021    TECHNIQUE: Sonography of the abdomen.    FINDINGS:  Liver: Mildly increased in size measuring 18.5 cm. Increased parenchymal   echogenicity suggestive of steatosis.  Bile ducts: Normal caliber. Common bile duct measures 0.3 cm.  Gallbladder: Intraluminal sludge and gallstones, no pericholecystic   fluid, positive sonographic Tobar sign was not elicited.  Pancreas: Visualized portions are within normal limits.  Spleen: 7.7 cm. Within normal limits.  Right kidney: 9.2. No hydronephrosis. 1.5 x 1.2 cm circumscribed   echogenic posterior cortical lesion, which corresponds to enhancing   lesion noted on CT scan and may be minimally increased in size compared   to prior ultrasound 4/21/2021  Left kidney: 10.8. No hydronephrosis.  Ascites: None.  Aorta and IVC: Visualized portions are within normal limits.    IMPRESSION:    Hepatic steatosis. No focal hepatic lesion.  Cholelithiasis without evidence of cholecystitis.  1.5 cm echogenic lesion in the posterior right renal cortex may be   minimally increased in size compared to prior ultrasound performed   4/21/2021, and corresponds to enhancing lesion noted on CT. Findings   suggest angiomyolipoma. Correlation with MRI can be performed for further   evaluation.    --- End of Report ---          RAHUL LAZARUS MD; Fellow Radiology  This document has been electronically signed.  HALLEY SHAFFER MD; Attending Radiologist  This document has been electronically signed. Feb 1 2024 11:02AM    < end of copied text >    Protein Calorie Malnutrition Present: [ ]mild [ ]moderate [ ]severe [ ]underweight [ ]morbid obesity  https://www.[facility]al.org/vault/2440/web/files/ONC/Table_Clinical%20Characteristics%20to%20Document%20Malnutrition-White%20JV%20et%20al%202012.pdf    Height (cm): 160 (01-30-24 @ 16:10), 160 (01-11-24 @ 13:12), 167.6 (09-18-23 @ 20:11)  Weight (kg): 36.3 (01-30-24 @ 16:10), 40.8 (01-11-24 @ 13:12), 49.9 (09-18-23 @ 20:11)  BMI (kg/m2): 14.2 (01-30-24 @ 16:10), 15.9 (01-11-24 @ 13:12), 17.8 (09-18-23 @ 20:11)    [x ]PPSV2 < or = 30%  [ ]significant weight loss [ ]poor nutritional intake [ ]anasarca[ ]Artificial Nutrition    Other REFERRALS:  [ ]Hospice  [ ]Child Life  [ ]Social Work  [ ]Case management [ ]Holistic Therapy     Goals of Care Document:

## 2024-02-01 NOTE — DIETITIAN NUTRITION RISK NOTIFICATION - TREATMENT: THE FOLLOWING DIET HAS BEEN RECOMMENDED
Diet, NPO with Tube Feed:   Tube Feeding Modality: Gastrostomy  Jevity 1.2 [place] (JEVITY1.2RTH)  Continuous  Starting Tube Feed Rate {mL per Hour}: 10  Increase Tube Feed Rate by (mL): 10     Every 4 hours  Until Goal Tube Feed Rate (mL per Hour): 50  Tube Feed Duration (in Hours): 24  Tube Feed Start Time: 07:30  Free Water Flush   Total Volume per Flush (mL): 60   Frequency: Every 4 Hours   Total Daily Volume of Flush (mL): 360 (01-31-24 @ 01:19) [Active]

## 2024-02-01 NOTE — PROGRESS NOTE ADULT - PROBLEM SELECTOR PLAN 4
- Na of 151, likely 2/2 to dehydration and malnutrition from PEG tube  - 0.45% Saline @125  - Free water flushed of 60cc q4h  - PEG tube placed 1/11  - continue tube feeds of jevity 1.2 @10 and increase to 50 total  - Nutrition consult  - BMP q12

## 2024-02-01 NOTE — PROGRESS NOTE ADULT - PROBLEM SELECTOR PLAN 5
will continue to follow for GOC  Can be reached by TEAMS M-F 9-5 Erika Rodríguez Any other time please page [phone] if needed will sign off as goals are established  given guarded prognosis and current GOC can consider ethics consult   case discussed with primary team   please reconsult as needed   Can be reached by TEAMS M-F 9-5 Erika Rodríguez Any other time please page [phone] if needed

## 2024-02-01 NOTE — PROVIDER CONTACT NOTE (CRITICAL VALUE NOTIFICATION) - ACTION/TREATMENT ORDERED:
Provider notified. Ordered to obtain blood glucose finger sticks -- blood glucose 123 when checking. Calcium lab and repeat BMP ordered. Labs drawn and sent.

## 2024-02-01 NOTE — DIETITIAN INITIAL EVALUATION ADULT - PERTINENT LABORATORY DATA
02-01    148<H>  |  115<H>  |  26<H>  ----------------------------<  126<H>  3.4<L>   |  24  |  0.39<L>    Ca    7.7<L>      01 Feb 2024 02:22  Mg     2.0     01-31    TPro  5.0<L>  /  Alb  2.4<L>  /  TBili  0.6  /  DBili  x   /  AST  37  /  ALT  60<H>  /  AlkPhos  49  01-31  POCT Blood Glucose.: 131 mg/dL (02-01-24 @ 06:20)  A1C with Estimated Average Glucose Result: 5.8 % (01-31-24 @ 06:48)  A1C with Estimated Average Glucose Result: 5.7 % (09-19-23 @ 10:33)

## 2024-02-01 NOTE — PROGRESS NOTE ADULT - PROBLEM SELECTOR PLAN 4
plan for family meeting today at 3:30 see [facility] note above   family meeting at bedside today  Full code  surrogate is  Silverio Arthur

## 2024-02-01 NOTE — PROGRESS NOTE ADULT - SUBJECTIVE AND OBJECTIVE BOX
PROGRESS NOTE:   Authored by Dr. Ciera Phillips MD, Available on MS Teams    Patient is a 68y old  Female who presents with a chief complaint of Sepsis 2/2 osteomyelitis (01 Feb 2024 13:51)      SUBJECTIVE / OVERNIGHT EVENTS: Unable to obtain hx due to mental status    ADDITIONAL REVIEW OF SYSTEMS:    MEDICATIONS  (STANDING):  ascorbic acid 500 milliGRAM(s) Oral daily  ceFAZolin   IVPB      ceFAZolin   IVPB 2000 milliGRAM(s) IV Intermittent every 8 hours  chlorhexidine 2% Cloths 1 Application(s) Topical <User Schedule>  collagenase Ointment 1 Application(s) Topical two times a day  Dakins Solution - 1/4 Strength 1 Application(s) Topical every 12 hours  dextrose 5%. 1000 milliLiter(s) (50 mL/Hr) IV Continuous <Continuous>  dextrose 5%. 1000 milliLiter(s) (100 mL/Hr) IV Continuous <Continuous>  dextrose 50% Injectable 12.5 Gram(s) IV Push once  dextrose 50% Injectable 25 Gram(s) IV Push once  dextrose 50% Injectable 25 Gram(s) IV Push once  folic acid 1 milliGRAM(s) Oral daily  glucagon  Injectable 1 milliGRAM(s) IntraMuscular once  insulin lispro (ADMELOG) corrective regimen sliding scale   SubCutaneous every 6 hours  levothyroxine Injectable 80 MICROGram(s) IV Push at bedtime  midodrine 10 milliGRAM(s) Oral every 8 hours  multivitamin 1 Tablet(s) Oral daily  polyethylene glycol 3350 17 Gram(s) Oral daily  potassium phosphate / sodium phosphate Powder (PHOS-NaK) 1 Packet(s) Oral three times a day  senna 2 Tablet(s) Oral at bedtime  sertraline 25 milliGRAM(s) Oral daily  sodium chloride 0.45%. 1000 milliLiter(s) (125 mL/Hr) IV Continuous <Continuous>  tamoxifen 20 milliGRAM(s) Oral daily  thiamine 100 milliGRAM(s) Oral daily    MEDICATIONS  (PRN):  acetaminophen     Tablet .. 650 milliGRAM(s) Oral every 6 hours PRN Temp greater or equal to 38C (100.4F), Mild Pain (1 - 3)  dextrose Oral Gel 15 Gram(s) Oral once PRN Blood Glucose LESS THAN 70 milliGRAM(s)/deciliter  melatonin 3 milliGRAM(s) Oral at bedtime PRN Insomnia      CAPILLARY BLOOD GLUCOSE      POCT Blood Glucose.: 148 mg/dL (01 Feb 2024 12:14)  POCT Blood Glucose.: 131 mg/dL (01 Feb 2024 06:20)  POCT Blood Glucose.: 123 mg/dL (01 Feb 2024 02:03)  POCT Blood Glucose.: 101 mg/dL (31 Jan 2024 17:44)    I&O's Summary    31 Jan 2024 07:01  -  01 Feb 2024 07:00  --------------------------------------------------------  IN: 1347.5 mL / OUT: 650 mL / NET: 697.5 mL        PHYSICAL EXAM:  Vital Signs Last 24 Hrs  T(C): 36.5 (01 Feb 2024 11:47), Max: 36.6 (31 Jan 2024 21:40)  T(F): 97.7 (01 Feb 2024 11:47), Max: 97.8 (31 Jan 2024 21:40)  HR: 73 (01 Feb 2024 11:47) (57 - 73)  BP: 88/56 (01 Feb 2024 11:47) (82/49 - 103/57)  BP(mean): --  RR: 18 (01 Feb 2024 11:47) (15 - 18)  SpO2: 95% (01 Feb 2024 11:47) (95% - 99%)    Parameters below as of 01 Feb 2024 11:47  Patient On (Oxygen Delivery Method): nasal cannula  O2 Flow (L/min): 2      CONSTITUTIONAL: ill appearing  RESPIRATORY: Normal respiratory effort; lungs are clear to auscultation bilaterally  CARDIOVASCULAR: Regular rate and rhythm, normal S1 and S2, no murmur/rub/gallop; No lower extremity edema  ABDOMEN: Nontender to palpation, normoactive bowel sounds, no rebound/guarding  MUSCLOSKELETAL: no clubbing or cyanosis of digits; contracted extremities  SKIN: multiple sacral wounds  PSYCH: A+O x0, nonverbal    LABS:                        9.2    14.23 )-----------( 168      ( 01 Feb 2024 13:20 )             30.9     02-01    142  |  113<H>  |  26<H>  ----------------------------<  150<H>  3.9   |  22  |  0.41<L>    Ca    7.5<L>      01 Feb 2024 13:20  Phos  2.2     02-01  Mg     2.1     02-01    TPro  5.0<L>  /  Alb  2.4<L>  /  TBili  0.6  /  DBili  x   /  AST  37  /  ALT  60<H>  /  AlkPhos  49  01-31    PT/INR - ( 30 Jan 2024 16:47 )   PT: 17.8 sec;   INR: 1.72 ratio         PTT - ( 30 Jan 2024 16:47 )  PTT:29.6 sec      Urinalysis Basic - ( 01 Feb 2024 13:20 )    Color: x / Appearance: x / SG: x / pH: x  Gluc: 150 mg/dL / Ketone: x  / Bili: x / Urobili: x   Blood: x / Protein: x / Nitrite: x   Leuk Esterase: x / RBC: x / WBC x   Sq Epi: x / Non Sq Epi: x / Bacteria: x        Culture - Urine (collected 30 Jan 2024 17:15)  Source: Clean Catch Clean Catch (Midstream)  Final Report (01 Feb 2024 00:16):    <10,000 CFU/mL Normal Urogenital Mireya    Culture - Blood (collected 30 Jan 2024 16:30)  Source: .Blood Blood-Peripheral  Gram Stain (31 Jan 2024 17:16):    Growth in aerobic bottle: Gram Positive Cocci in Clusters    Growth in anaerobic bottle: Gram Positive Cocci in Clusters  Preliminary Report (01 Feb 2024 13:04):    Growth in aerobic and anaerobic bottles: Staphylococcus aureus    Direct identification is available within approximately 3-5    hours either by Blood Panel Multiplexed PCR or Direct    MALDI-TOF. Details: [email or web address]  Organism: Blood Culture PCR (31 Jan 2024 17:54)  Organism: Blood Culture PCR (31 Jan 2024 17:54)    Culture - Blood (collected 30 Jan 2024 16:20)  Source: .Blood Blood-Peripheral  Preliminary Report (31 Jan 2024 22:02):    No growth at 24 hours

## 2024-02-01 NOTE — DIETITIAN INITIAL EVALUATION ADULT - SIGNS/SYMPTOMS
multiple pressure injuries noted per wound care RN muscle/fat wasting, wt loss, BMI = 14.2  severe muscle/fat wasting, wt loss >7.5% x 3 mos, BMI = 16.2

## 2024-02-01 NOTE — DIETITIAN INITIAL EVALUATION ADULT - NSFNSPHYEXAMSKINFT_GEN_A_CORE
Problem: Patient Care Overview  Goal: Plan of Care Review  Outcome: Ongoing (interventions implemented as appropriate)   10/08/19 2008   Coping/Psychosocial   Plan of Care Reviewed With patient   Plan of Care Review   Progress no change   OTHER   Outcome Summary HR variable 100-130s. B/P low during dialysis. Recovered some with Albumin. Pt having extreme pain Left femur with any movement. Pain medication given.      Goal: Individualization and Mutuality  Outcome: Ongoing (interventions implemented as appropriate)    Goal: Discharge Needs Assessment  Outcome: Ongoing (interventions implemented as appropriate)    Goal: Interprofessional Rounds/Family Conf  Outcome: Ongoing (interventions implemented as appropriate)      Problem: Fall Risk (Adult)  Goal: Absence of Fall  Outcome: Ongoing (interventions implemented as appropriate)      Problem: Hemodialysis (Adult)  Goal: Signs and Symptoms of Listed Potential Problems Will be Absent, Minimized or Managed (Hemodialysis)  Outcome: Ongoing (interventions implemented as appropriate)      Problem: Pneumonia (Adult)  Goal: Signs and Symptoms of Listed Potential Problems Will be Absent, Minimized or Managed (Pneumonia)  Outcome: Ongoing (interventions implemented as appropriate)      Problem: Skin Injury Risk (Adult)  Goal: Skin Health and Integrity  Outcome: Ongoing (interventions implemented as appropriate)      Problem: Confusion, Acute (Adult)  Goal: Safety  Outcome: Ongoing (interventions implemented as appropriate)         Pressure Injury 1: Right:, buttocks, Unstageable  Pressure Injury 2: sacrum, Unstageable  Pressure Injury 3: Left:, shoulder, Suspected deep tissue injury  Pressure Injury 4: Left:, buttocks, none  Pressure Injury 5: none, none  Pressure Injury 6: none, none  Pressure Injury 7: none, none  Pressure Injury 8: none, none  Pressure Injury 9: none, none  Pressure Injury 10: none, none  Pressure Injury 11: none, none, Pressure Injury 1: Right:, buttocks, Unstageable  Pressure Injury 2: sacrum, Unstageable  Pressure Injury 3: Left:, shoulder, Suspected deep tissue injury  Pressure Injury 4: none, none  Pressure Injury 5: none, none  Pressure Injury 6: none, none  Pressure Injury 7: none, none  Pressure Injury 8: none, none  Pressure Injury 9: none, none  Pressure Injury 10: none, none  Pressure Injury 11: none, none Per wound care RN (1/31), pt with "R hip + sacral + buttocks stage 4 pressure injury; L shoulder + hip + spine unstageable pressure injury; L ear DTI; L foot DTI"

## 2024-02-01 NOTE — PATIENT PROFILE ADULT - FALL HARM RISK - HARM RISK INTERVENTIONS
Assistance with ambulation/Discuss with provider need for PT consult/Monitor for mental status changes/Monitor gait and stability/Provide patient with walking aids - walker, cane, crutches/Reinforce activity limits and safety measures with patient and family/Tailored Fall Risk Interventions/Use of alarms - bed, chair and/or voice tab/Visual Cue: Yellow wristband and red socks/Bed in lowest position, wheels locked, appropriate side rails in place/Call bell, personal items and telephone in reach/Instruct patient to call for assistance before getting out of bed or chair/Non-slip footwear when patient is out of bed/[place] to call system/Physically safe environment - no spills, clutter or unnecessary equipment/Purposeful Proactive Rounding/Room/bathroom lighting operational, light cord in reach Assistance OOB with selected safe patient handling equipment/Communicate Risk of Fall with Harm to all staff/Discuss with provider need for PT consult/Monitor gait and stability/Provide patient with walking aids - walker, cane, crutches/Reinforce activity limits and safety measures with patient and family/Tailored Fall Risk Interventions/Visual Cue: Yellow wristband and red socks/Bed in lowest position, wheels locked, appropriate side rails in place/Call bell, personal items and telephone in reach/Instruct patient to call for assistance before getting out of bed or chair/Non-slip footwear when patient is out of bed/[place] to call system/Physically safe environment - no spills, clutter or unnecessary equipment/Purposeful Proactive Rounding/Room/bathroom lighting operational, light cord in reach

## 2024-02-01 NOTE — DIETITIAN INITIAL EVALUATION ADULT - ADD RECOMMEND
[X] Consider changing EN regimen via PEG as follows: Jevity 1.2 @ 50 mL/hr x 24 hrs with 2 packets of Carlos (per pkt provides 2.5 g PRO, 90 kcal); EN regimen at goal to provide: mL total volume/day, kcal/day (kcal/kg), g PRO/day (g PRO/kg), mL free water; based on IBW of 52.3.            [X] RD remains available upon request for TF formulary/rate adjustments prn.   [X] Continue with multivitamin, vitamin C for wound healing, and thiamine in setting of refeeding risk, pending no medical contraindications.   [X] RD will continue to monitor PO intake, GI tolerance, weight trends, skin integrity, BMs, labs/electrolytes prn.   RD remains available upon request.  [X] Consider changing EN regimen via PEG as follows: Jevity 1.2 @  with 2 packets of Carlos (per pkt provides 2.5 g PRO, 90 kcal); EN regimen at goal to provide: mL total volume/day, kcal/day (kcal/kg), g PRO/day (g PRO/kg), mL free water; based on dosing wt of 36.3 kg.             [X] RD remains available upon request for TF formulary/rate adjustments prn.   [X] Continue with multivitamin, vitamin C for wound healing, and thiamine in setting of refeeding risk, pending no medical contraindications.   [X] RD will continue to monitor PO intake, GI tolerance, weight trends, skin integrity, BMs, labs/electrolytes prn.   RD remains available upon request.  [X] Consider changing EN regimen via PEG as follows: Glucerna 1.5 with 1 pkt Liquid Protein Supplementation (per serving provides 15 g PRO, 100 kcal) and 2 pkts of Carlos (per pkt provides 2.5 g PRO, 90 kcal) @ 10 mL/hr and titrate upwards as pt tolerates/lytes replete to goal rate of 50 mL/hr x 18 hrs; EN regimen with modular components at goal to provide: 900 mL total volume, 1630 kcal/day (39 Kcal/kg), ~94g PRO/day (2.2 g PRO/kg), 683 mL free water/day; based on NH weight: 41.7 kg. Noted recommended regimen to mimic NH EN regimen; monitor for indications to remove therapeutic formulary restrictions.             [X] RD remains available upon request for TF formulary/rate adjustments prn.   [X] Continue with multivitamin, vitamin C for wound healing, and thiamine in setting of refeeding risk, pending no medical contraindications.   [X] Malnutrition sticker placed in chart   [X] RD will continue to monitor PO intake, GI tolerance, weight trends, skin integrity, BMs, labs/electrolytes prn.   RD remains available upon request.

## 2024-02-02 LAB
-  AMPICILLIN/SULBACTAM: SIGNIFICANT CHANGE UP
-  CEFAZOLIN: SIGNIFICANT CHANGE UP
-  CLINDAMYCIN: SIGNIFICANT CHANGE UP
-  ERYTHROMYCIN: SIGNIFICANT CHANGE UP
-  GENTAMICIN: SIGNIFICANT CHANGE UP
-  OXACILLIN: SIGNIFICANT CHANGE UP
-  RIFAMPIN: SIGNIFICANT CHANGE UP
-  TETRACYCLINE: SIGNIFICANT CHANGE UP
-  TRIMETHOPRIM/SULFAMETHOXAZOLE: SIGNIFICANT CHANGE UP
-  VANCOMYCIN: SIGNIFICANT CHANGE UP
ALBUMIN SERPL ELPH-MCNC: 1.1 G/DL — LOW (ref 3.3–5)
ALBUMIN SERPL ELPH-MCNC: 2 G/DL — LOW (ref 3.3–5)
ALP SERPL-CCNC: 42 U/L — SIGNIFICANT CHANGE UP (ref 40–120)
ALP SERPL-CCNC: 56 U/L — SIGNIFICANT CHANGE UP (ref 40–120)
ALT FLD-CCNC: 14 U/L — SIGNIFICANT CHANGE UP (ref 10–45)
ALT FLD-CCNC: 20 U/L — SIGNIFICANT CHANGE UP (ref 10–45)
ANION GAP SERPL CALC-SCNC: 12 MMOL/L — SIGNIFICANT CHANGE UP (ref 5–17)
ANION GAP SERPL CALC-SCNC: 5 MMOL/L — SIGNIFICANT CHANGE UP (ref 5–17)
ANION GAP SERPL CALC-SCNC: 9 MMOL/L — SIGNIFICANT CHANGE UP (ref 5–17)
ANION GAP SERPL CALC-SCNC: 9 MMOL/L — SIGNIFICANT CHANGE UP (ref 5–17)
APPEARANCE UR: CLEAR — SIGNIFICANT CHANGE UP
APTT BLD: 24.1 SEC — LOW (ref 24.5–35.6)
AST SERPL-CCNC: 12 U/L — SIGNIFICANT CHANGE UP (ref 10–40)
AST SERPL-CCNC: 15 U/L — SIGNIFICANT CHANGE UP (ref 10–40)
BACTERIA # UR AUTO: NEGATIVE /HPF — SIGNIFICANT CHANGE UP
BASOPHILS # BLD AUTO: 0.01 K/UL — SIGNIFICANT CHANGE UP (ref 0–0.2)
BASOPHILS # BLD AUTO: 0.02 K/UL — SIGNIFICANT CHANGE UP (ref 0–0.2)
BASOPHILS NFR BLD AUTO: 0.1 % — SIGNIFICANT CHANGE UP (ref 0–2)
BASOPHILS NFR BLD AUTO: 0.1 % — SIGNIFICANT CHANGE UP (ref 0–2)
BILIRUB SERPL-MCNC: 0.2 MG/DL — SIGNIFICANT CHANGE UP (ref 0.2–1.2)
BILIRUB SERPL-MCNC: 0.2 MG/DL — SIGNIFICANT CHANGE UP (ref 0.2–1.2)
BILIRUB UR-MCNC: NEGATIVE — SIGNIFICANT CHANGE UP
BUN SERPL-MCNC: 20 MG/DL — SIGNIFICANT CHANGE UP (ref 7–23)
BUN SERPL-MCNC: 26 MG/DL — HIGH (ref 7–23)
CA-I BLD-SCNC: 1.07 MMOL/L — LOW (ref 1.15–1.33)
CALCIUM SERPL-MCNC: 4.4 MG/DL — CRITICAL LOW (ref 8.4–10.5)
CALCIUM SERPL-MCNC: 6.4 MG/DL — CRITICAL LOW (ref 8.4–10.5)
CALCIUM SERPL-MCNC: 6.4 MG/DL — CRITICAL LOW (ref 8.4–10.5)
CALCIUM SERPL-MCNC: 7.2 MG/DL — LOW (ref 8.4–10.5)
CAST: 5 /LPF — HIGH (ref 0–4)
CHLORIDE SERPL-SCNC: 103 MMOL/L — SIGNIFICANT CHANGE UP (ref 96–108)
CHLORIDE SERPL-SCNC: 111 MMOL/L — HIGH (ref 96–108)
CHLORIDE SERPL-SCNC: 115 MMOL/L — HIGH (ref 96–108)
CHLORIDE SERPL-SCNC: 115 MMOL/L — HIGH (ref 96–108)
CO2 SERPL-SCNC: 14 MMOL/L — LOW (ref 22–31)
CO2 SERPL-SCNC: 18 MMOL/L — LOW (ref 22–31)
CO2 SERPL-SCNC: 18 MMOL/L — LOW (ref 22–31)
CO2 SERPL-SCNC: 20 MMOL/L — LOW (ref 22–31)
COLOR SPEC: YELLOW — SIGNIFICANT CHANGE UP
CREAT SERPL-MCNC: 0.38 MG/DL — LOW (ref 0.5–1.3)
CREAT SERPL-MCNC: <0.3 MG/DL — LOW (ref 0.5–1.3)
CULTURE RESULTS: ABNORMAL
DIFF PNL FLD: NEGATIVE — SIGNIFICANT CHANGE UP
EGFR: 108 ML/MIN/1.73M2 — SIGNIFICANT CHANGE UP
EGFR: 114 ML/MIN/1.73M2 — SIGNIFICANT CHANGE UP
EOSINOPHIL # BLD AUTO: 0 K/UL — SIGNIFICANT CHANGE UP (ref 0–0.5)
EOSINOPHIL # BLD AUTO: 0 K/UL — SIGNIFICANT CHANGE UP (ref 0–0.5)
EOSINOPHIL NFR BLD AUTO: 0 % — SIGNIFICANT CHANGE UP (ref 0–6)
EOSINOPHIL NFR BLD AUTO: 0 % — SIGNIFICANT CHANGE UP (ref 0–6)
GAS PNL BLDA: SIGNIFICANT CHANGE UP
GAS PNL BLDV: SIGNIFICANT CHANGE UP
GLUCOSE BLDC GLUCOMTR-MCNC: 133 MG/DL — HIGH (ref 70–99)
GLUCOSE BLDC GLUCOMTR-MCNC: 155 MG/DL — HIGH (ref 70–99)
GLUCOSE BLDC GLUCOMTR-MCNC: 170 MG/DL — HIGH (ref 70–99)
GLUCOSE BLDC GLUCOMTR-MCNC: 203 MG/DL — HIGH (ref 70–99)
GLUCOSE SERPL-MCNC: 126 MG/DL — HIGH (ref 70–99)
GLUCOSE SERPL-MCNC: 126 MG/DL — HIGH (ref 70–99)
GLUCOSE SERPL-MCNC: 171 MG/DL — HIGH (ref 70–99)
GLUCOSE SERPL-MCNC: 95 MG/DL — SIGNIFICANT CHANGE UP (ref 70–99)
GLUCOSE UR QL: NEGATIVE MG/DL — SIGNIFICANT CHANGE UP
HCT VFR BLD CALC: 24.8 % — LOW (ref 34.5–45)
HCT VFR BLD CALC: 30.6 % — LOW (ref 34.5–45)
HGB BLD-MCNC: 7.5 G/DL — LOW (ref 11.5–15.5)
HGB BLD-MCNC: 8.7 G/DL — LOW (ref 11.5–15.5)
IMM GRANULOCYTES NFR BLD AUTO: 1.1 % — HIGH (ref 0–0.9)
IMM GRANULOCYTES NFR BLD AUTO: 1.2 % — HIGH (ref 0–0.9)
INR BLD: 1.15 RATIO — SIGNIFICANT CHANGE UP (ref 0.85–1.18)
KETONES UR-MCNC: NEGATIVE MG/DL — SIGNIFICANT CHANGE UP
LEUKOCYTE ESTERASE UR-ACNC: NEGATIVE — SIGNIFICANT CHANGE UP
LYMPHOCYTES # BLD AUTO: 0.44 K/UL — LOW (ref 1–3.3)
LYMPHOCYTES # BLD AUTO: 0.8 K/UL — LOW (ref 1–3.3)
LYMPHOCYTES # BLD AUTO: 2.9 % — LOW (ref 13–44)
LYMPHOCYTES # BLD AUTO: 7.2 % — LOW (ref 13–44)
MAGNESIUM SERPL-MCNC: 1.3 MG/DL — LOW (ref 1.6–2.6)
MAGNESIUM SERPL-MCNC: 1.9 MG/DL — SIGNIFICANT CHANGE UP (ref 1.6–2.6)
MAGNESIUM SERPL-MCNC: 2.1 MG/DL — SIGNIFICANT CHANGE UP (ref 1.6–2.6)
MCHC RBC-ENTMCNC: 26.7 PG — LOW (ref 27–34)
MCHC RBC-ENTMCNC: 26.8 PG — LOW (ref 27–34)
MCHC RBC-ENTMCNC: 28.4 GM/DL — LOW (ref 32–36)
MCHC RBC-ENTMCNC: 30.2 GM/DL — LOW (ref 32–36)
MCV RBC AUTO: 88.6 FL — SIGNIFICANT CHANGE UP (ref 80–100)
MCV RBC AUTO: 93.9 FL — SIGNIFICANT CHANGE UP (ref 80–100)
METHOD TYPE: SIGNIFICANT CHANGE UP
MONOCYTES # BLD AUTO: 0.33 K/UL — SIGNIFICANT CHANGE UP (ref 0–0.9)
MONOCYTES # BLD AUTO: 0.44 K/UL — SIGNIFICANT CHANGE UP (ref 0–0.9)
MONOCYTES NFR BLD AUTO: 2.2 % — SIGNIFICANT CHANGE UP (ref 2–14)
MONOCYTES NFR BLD AUTO: 4 % — SIGNIFICANT CHANGE UP (ref 2–14)
NEUTROPHILS # BLD AUTO: 14.11 K/UL — HIGH (ref 1.8–7.4)
NEUTROPHILS # BLD AUTO: 9.72 K/UL — HIGH (ref 1.8–7.4)
NEUTROPHILS NFR BLD AUTO: 87.6 % — HIGH (ref 43–77)
NEUTROPHILS NFR BLD AUTO: 93.6 % — HIGH (ref 43–77)
NITRITE UR-MCNC: NEGATIVE — SIGNIFICANT CHANGE UP
NRBC # BLD: 0 /100 WBCS — SIGNIFICANT CHANGE UP (ref 0–0)
NRBC # BLD: 0 /100 WBCS — SIGNIFICANT CHANGE UP (ref 0–0)
ORGANISM # SPEC MICROSCOPIC CNT: ABNORMAL
PH UR: 5.5 — SIGNIFICANT CHANGE UP (ref 5–8)
PHOSPHATE SERPL-MCNC: 1.4 MG/DL — LOW (ref 2.5–4.5)
PHOSPHATE SERPL-MCNC: 1.8 MG/DL — LOW (ref 2.5–4.5)
PHOSPHATE SERPL-MCNC: 2.1 MG/DL — LOW (ref 2.5–4.5)
PLATELET # BLD AUTO: 142 K/UL — LOW (ref 150–400)
PLATELET # BLD AUTO: 151 K/UL — SIGNIFICANT CHANGE UP (ref 150–400)
POTASSIUM SERPL-MCNC: 3 MMOL/L — LOW (ref 3.5–5.3)
POTASSIUM SERPL-MCNC: 3.4 MMOL/L — LOW (ref 3.5–5.3)
POTASSIUM SERPL-MCNC: 3.4 MMOL/L — LOW (ref 3.5–5.3)
POTASSIUM SERPL-MCNC: 3.9 MMOL/L — SIGNIFICANT CHANGE UP (ref 3.5–5.3)
POTASSIUM SERPL-SCNC: 3 MMOL/L — LOW (ref 3.5–5.3)
POTASSIUM SERPL-SCNC: 3.4 MMOL/L — LOW (ref 3.5–5.3)
POTASSIUM SERPL-SCNC: 3.4 MMOL/L — LOW (ref 3.5–5.3)
POTASSIUM SERPL-SCNC: 3.9 MMOL/L — SIGNIFICANT CHANGE UP (ref 3.5–5.3)
PROT SERPL-MCNC: 2.4 G/DL — LOW (ref 6–8.3)
PROT SERPL-MCNC: 3.9 G/DL — LOW (ref 6–8.3)
PROT UR-MCNC: NEGATIVE MG/DL — SIGNIFICANT CHANGE UP
PROTHROM AB SERPL-ACNC: 12 SEC — SIGNIFICANT CHANGE UP (ref 9.5–13)
RBC # BLD: 2.8 M/UL — LOW (ref 3.8–5.2)
RBC # BLD: 3.26 M/UL — LOW (ref 3.8–5.2)
RBC # FLD: 16.7 % — HIGH (ref 10.3–14.5)
RBC # FLD: 17 % — HIGH (ref 10.3–14.5)
RBC CASTS # UR COMP ASSIST: 1 /HPF — SIGNIFICANT CHANGE UP (ref 0–4)
REVIEW: SIGNIFICANT CHANGE UP
SODIUM SERPL-SCNC: 122 MMOL/L — LOW (ref 135–145)
SODIUM SERPL-SCNC: 142 MMOL/L — SIGNIFICANT CHANGE UP (ref 135–145)
SODIUM SERPL-SCNC: 142 MMOL/L — SIGNIFICANT CHANGE UP (ref 135–145)
SODIUM SERPL-SCNC: 143 MMOL/L — SIGNIFICANT CHANGE UP (ref 135–145)
SP GR SPEC: 1.01 — SIGNIFICANT CHANGE UP (ref 1–1.03)
SPECIMEN SOURCE: SIGNIFICANT CHANGE UP
SQUAMOUS # UR AUTO: 1 /HPF — SIGNIFICANT CHANGE UP (ref 0–5)
TROPONIN T, HIGH SENSITIVITY RESULT: 33 NG/L — SIGNIFICANT CHANGE UP (ref 0–51)
UROBILINOGEN FLD QL: 0.2 MG/DL — SIGNIFICANT CHANGE UP (ref 0.2–1)
WBC # BLD: 11.09 K/UL — HIGH (ref 3.8–10.5)
WBC # BLD: 15.08 K/UL — HIGH (ref 3.8–10.5)
WBC # FLD AUTO: 11.09 K/UL — HIGH (ref 3.8–10.5)
WBC # FLD AUTO: 15.08 K/UL — HIGH (ref 3.8–10.5)
WBC UR QL: 1 /HPF — SIGNIFICANT CHANGE UP (ref 0–5)

## 2024-02-02 PROCEDURE — 99232 SBSQ HOSP IP/OBS MODERATE 35: CPT

## 2024-02-02 PROCEDURE — 93308 TTE F-UP OR LMTD: CPT | Mod: 26,GC

## 2024-02-02 PROCEDURE — 99291 CRITICAL CARE FIRST HOUR: CPT

## 2024-02-02 PROCEDURE — 93010 ELECTROCARDIOGRAM REPORT: CPT

## 2024-02-02 PROCEDURE — 76604 US EXAM CHEST: CPT | Mod: 26,GC

## 2024-02-02 RX ORDER — SODIUM CHLORIDE 9 MG/ML
500 INJECTION INTRAMUSCULAR; INTRAVENOUS; SUBCUTANEOUS ONCE
Refills: 0 | Status: COMPLETED | OUTPATIENT
Start: 2024-02-02 | End: 2024-02-02

## 2024-02-02 RX ORDER — SODIUM,POTASSIUM PHOSPHATES 278-250MG
1 POWDER IN PACKET (EA) ORAL ONCE
Refills: 0 | Status: COMPLETED | OUTPATIENT
Start: 2024-02-02 | End: 2024-02-02

## 2024-02-02 RX ORDER — ALBUMIN HUMAN 25 %
250 VIAL (ML) INTRAVENOUS
Refills: 0 | Status: COMPLETED | OUTPATIENT
Start: 2024-02-02 | End: 2024-02-02

## 2024-02-02 RX ORDER — POTASSIUM CHLORIDE 20 MEQ
40 PACKET (EA) ORAL ONCE
Refills: 0 | Status: COMPLETED | OUTPATIENT
Start: 2024-02-02 | End: 2024-02-02

## 2024-02-02 RX ORDER — MIDODRINE HYDROCHLORIDE 2.5 MG/1
5 TABLET ORAL EVERY 8 HOURS
Refills: 0 | Status: DISCONTINUED | OUTPATIENT
Start: 2024-02-03 | End: 2024-02-03

## 2024-02-02 RX ORDER — MIDODRINE HYDROCHLORIDE 2.5 MG/1
20 TABLET ORAL ONCE
Refills: 0 | Status: DISCONTINUED | OUTPATIENT
Start: 2024-02-02 | End: 2024-02-02

## 2024-02-02 RX ORDER — MIDODRINE HYDROCHLORIDE 2.5 MG/1
10 TABLET ORAL ONCE
Refills: 0 | Status: COMPLETED | OUTPATIENT
Start: 2024-02-02 | End: 2024-02-02

## 2024-02-02 RX ORDER — NOREPINEPHRINE BITARTRATE/D5W 8 MG/250ML
0.05 PLASTIC BAG, INJECTION (ML) INTRAVENOUS
Qty: 8 | Refills: 0 | Status: DISCONTINUED | OUTPATIENT
Start: 2024-02-02 | End: 2024-02-06

## 2024-02-02 RX ORDER — SODIUM CHLORIDE 9 MG/ML
1000 INJECTION INTRAMUSCULAR; INTRAVENOUS; SUBCUTANEOUS ONCE
Refills: 0 | Status: COMPLETED | OUTPATIENT
Start: 2024-02-02 | End: 2024-02-02

## 2024-02-02 RX ORDER — ALBUMIN HUMAN 25 %
500 VIAL (ML) INTRAVENOUS ONCE
Refills: 0 | Status: DISCONTINUED | OUTPATIENT
Start: 2024-02-02 | End: 2024-02-02

## 2024-02-02 RX ORDER — MIDODRINE HYDROCHLORIDE 2.5 MG/1
5 TABLET ORAL EVERY 8 HOURS
Refills: 0 | Status: DISCONTINUED | OUTPATIENT
Start: 2024-02-02 | End: 2024-02-02

## 2024-02-02 RX ORDER — HEPARIN SODIUM 5000 [USP'U]/ML
5000 INJECTION INTRAVENOUS; SUBCUTANEOUS EVERY 12 HOURS
Refills: 0 | Status: DISCONTINUED | OUTPATIENT
Start: 2024-02-02 | End: 2024-02-20

## 2024-02-02 RX ORDER — SODIUM CHLORIDE 9 MG/ML
1000 INJECTION INTRAMUSCULAR; INTRAVENOUS; SUBCUTANEOUS
Refills: 0 | Status: DISCONTINUED | OUTPATIENT
Start: 2024-02-02 | End: 2024-02-02

## 2024-02-02 RX ADMIN — Medication 1 APPLICATION(S): at 05:33

## 2024-02-02 RX ADMIN — Medication 80 MICROGRAM(S): at 22:52

## 2024-02-02 RX ADMIN — Medication 1 TABLET(S): at 12:16

## 2024-02-02 RX ADMIN — Medication 3.4 MICROGRAM(S)/KG/MIN: at 19:30

## 2024-02-02 RX ADMIN — Medication 2: at 12:15

## 2024-02-02 RX ADMIN — CHLORHEXIDINE GLUCONATE 1 APPLICATION(S): 213 SOLUTION TOPICAL at 07:02

## 2024-02-02 RX ADMIN — Medication 100 MILLIGRAM(S): at 05:43

## 2024-02-02 RX ADMIN — SODIUM CHLORIDE 1500 MILLILITER(S): 9 INJECTION INTRAMUSCULAR; INTRAVENOUS; SUBCUTANEOUS at 16:20

## 2024-02-02 RX ADMIN — Medication 1 APPLICATION(S): at 18:55

## 2024-02-02 RX ADMIN — Medication 1 PACKET(S): at 08:49

## 2024-02-02 RX ADMIN — Medication 50 MILLIGRAM(S): at 18:55

## 2024-02-02 RX ADMIN — SENNA PLUS 2 TABLET(S): 8.6 TABLET ORAL at 21:33

## 2024-02-02 RX ADMIN — Medication 40 MILLIEQUIVALENT(S): at 19:02

## 2024-02-02 RX ADMIN — Medication 1 PACKET(S): at 00:40

## 2024-02-02 RX ADMIN — SODIUM CHLORIDE 100 MILLILITER(S): 9 INJECTION INTRAMUSCULAR; INTRAVENOUS; SUBCUTANEOUS at 13:04

## 2024-02-02 RX ADMIN — MIDODRINE HYDROCHLORIDE 10 MILLIGRAM(S): 2.5 TABLET ORAL at 12:16

## 2024-02-02 RX ADMIN — TAMOXIFEN CITRATE 20 MILLIGRAM(S): 20 TABLET, FILM COATED ORAL at 12:16

## 2024-02-02 RX ADMIN — SODIUM CHLORIDE 100 MILLILITER(S): 9 INJECTION INTRAMUSCULAR; INTRAVENOUS; SUBCUTANEOUS at 19:30

## 2024-02-02 RX ADMIN — Medication 1 MILLIGRAM(S): at 12:16

## 2024-02-02 RX ADMIN — Medication 125 MILLILITER(S): at 21:56

## 2024-02-02 RX ADMIN — Medication 1: at 23:20

## 2024-02-02 RX ADMIN — Medication 125 MILLILITER(S): at 19:54

## 2024-02-02 RX ADMIN — Medication 50 MILLIGRAM(S): at 05:39

## 2024-02-02 RX ADMIN — Medication 100 MILLIGRAM(S): at 21:56

## 2024-02-02 RX ADMIN — Medication 100 MILLIGRAM(S): at 13:04

## 2024-02-02 RX ADMIN — Medication 100 MILLIGRAM(S): at 12:16

## 2024-02-02 RX ADMIN — MIDODRINE HYDROCHLORIDE 10 MILLIGRAM(S): 2.5 TABLET ORAL at 05:34

## 2024-02-02 RX ADMIN — MIDODRINE HYDROCHLORIDE 10 MILLIGRAM(S): 2.5 TABLET ORAL at 21:34

## 2024-02-02 RX ADMIN — Medication 1: at 00:39

## 2024-02-02 RX ADMIN — Medication 1 APPLICATION(S): at 05:34

## 2024-02-02 RX ADMIN — Medication 3.4 MICROGRAM(S)/KG/MIN: at 19:03

## 2024-02-02 RX ADMIN — Medication 1 PACKET(S): at 19:02

## 2024-02-02 RX ADMIN — Medication 1 DROP(S): at 18:54

## 2024-02-02 RX ADMIN — SODIUM CHLORIDE 1500 MILLILITER(S): 9 INJECTION INTRAMUSCULAR; INTRAVENOUS; SUBCUTANEOUS at 07:49

## 2024-02-02 RX ADMIN — SERTRALINE 25 MILLIGRAM(S): 25 TABLET, FILM COATED ORAL at 12:17

## 2024-02-02 RX ADMIN — Medication 500 MILLIGRAM(S): at 12:16

## 2024-02-02 RX ADMIN — Medication 1: at 06:56

## 2024-02-02 RX ADMIN — SODIUM CHLORIDE 125 MILLILITER(S): 9 INJECTION INTRAMUSCULAR; INTRAVENOUS; SUBCUTANEOUS at 08:59

## 2024-02-02 NOTE — CHART NOTE - NSCHARTNOTEFT_GEN_A_CORE
MICU Accept Note    CHIEF COMPLAINT: hypotension    HPI / INTERVAL HISTORY:    Patient is a 68 year old F with hx of TBI, CVA (nonverbal, bedbound at baseline) hypothyroidism, L breast cancer (s/p mastectomy and breast tissue expander, on tamoxifen) who presents from [facility] for several days of lethargy, dec responsiveness, and fever. The patient was recently discharged (1/10/2024) after a prolonged hospitalization for UTI, hypernatremia, FTT, complicated by AHRF 2/2 to aspiration PNA.     In the ED, VS notable for HR , BP 70-90/50-60s, Tmax 103.4F, SpO2 100% on 2L NC. Labs remarkable for WBC 14.75, hgb 10.5, Na 151, Cl 115, and AST/ALT elevation. The patient received vancomycin, zosyn, 3L NS, 20 midodrine, 50 IV hydrocortisone, and started on mIVF with 1/2 NS. CT remarkable for sacral decubitus ulcer with possible osteomyelitis. Initially managed on vanc/zosyn -> transitioned to ancef 2/1. On 2/2 RRT called for hypotension, received 1L bolus + midodrine with minimal improvement in O2     MICU consulted for persistent hypotension in the setting of sepsis from osteomyelitis.      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      C Section      Abnormality, eye  h/o left eye vitrectomy      S/P D&C (status post dilation and curettage)      H/O left mastectomy          FAMILY HISTORY:  No pertinent family history in first degree relatives        SOCIAL HISTORY:  Smoking:   Substance Use:   EtOH Use:   Marital Status:   Sexual History:   Occupation:  Recent Travel:  Country of Birth:   Advance Directives:     HOME MEDICATIONS:      Allergies    Tapazole (Hives)    Intolerances          REVIEW OF SYSTEMS:  Constitutional: No fevers, chills, weight loss, weight gain  HEENT: No vision problems, eye pain, nasal congestion, rhinorrhea, sore throat, dysphagia  CV: No chest pain, orthopnea, palpitations  Resp: No cough, dyspnea, wheezing, hemoptysis  GI: No nausea, vomiting, diarrhea, constipation, abdominal pain  : [ ] dysuria [ ] nocturia [ ] hematuria [ ] increased urinary frequency  Musculoskeletal: [ ] back pain [ ] myalgias [ ] arthralgias [ ] fracture  Skin: [ ] rash [ ] itch  Neurological: [ ] headache [ ] dizziness [ ] syncope [ ] weakness [ ] numbness  Psychiatric: [ ] anxiety [ ] depression  Endocrine: [ ] diabetes [ ] thyroid problem  Hematologic/Lymphatic: [ ] anemia [ ] bleeding problem  Allergic/Immunologic: [ ] itchy eyes [ ] nasal discharge [ ] hives [ ] angioedema  [ ] All other systems negative  [ ] Unable to assess ROS because ________    OBJECTIVE:  ICU Vital Signs Last 24 Hrs  T(C): 36.8 (02 Feb 2024 11:42), Max: 37 (01 Feb 2024 21:07)  T(F): 98.2 (02 Feb 2024 11:42), Max: 98.6 (01 Feb 2024 21:07)  HR: 70 (02 Feb 2024 16:12) (70 - 81)  BP: 75/48 (02 Feb 2024 16:12) (75/48 - 96/65)  BP(mean): --  ABP: --  ABP(mean): --  RR: 18 (02 Feb 2024 11:42) (18 - 18)  SpO2: 98% (02 Feb 2024 05:29) (98% - 99%)    O2 Parameters below as of 02 Feb 2024 11:42  Patient On (Oxygen Delivery Method): nasal cannula              02-01 @ 07:01 - 02-02 @ 07:00  --------------------------------------------------------  IN: 1500 mL / OUT: 500 mL / NET: 1000 mL    02-02 @ 07:01  -  02-02 @ 17:19  --------------------------------------------------------  IN: 0 mL / OUT: 300 mL / NET: -300 mL      CAPILLARY BLOOD GLUCOSE      POCT Blood Glucose.: 133 mg/dL (02 Feb 2024 16:35)      PHYSICAL EXAM:  General:   HEENT:   Neck:   Chest/Lungs:  Heart:  Abdomen:   Extremities:   Skin:   Neuro:   Psych:     LINES:     HOSPITAL MEDICATIONS:  MEDICATIONS  (STANDING):  artificial  tears Solution 1 Drop(s) Both EYES two times a day  ascorbic acid 500 milliGRAM(s) Oral daily  ceFAZolin   IVPB 2000 milliGRAM(s) IV Intermittent every 8 hours  ceFAZolin   IVPB      chlorhexidine 2% Cloths 1 Application(s) Topical <User Schedule>  collagenase Ointment 1 Application(s) Topical two times a day  Dakins Solution - 1/4 Strength 1 Application(s) Topical every 12 hours  dextrose 5%. 1000 milliLiter(s) (50 mL/Hr) IV Continuous <Continuous>  dextrose 5%. 1000 milliLiter(s) (100 mL/Hr) IV Continuous <Continuous>  dextrose 50% Injectable 12.5 Gram(s) IV Push once  dextrose 50% Injectable 25 Gram(s) IV Push once  dextrose 50% Injectable 25 Gram(s) IV Push once  folic acid 1 milliGRAM(s) Oral daily  glucagon  Injectable 1 milliGRAM(s) IntraMuscular once  hydrocortisone sodium succinate Injectable 50 milliGRAM(s) IV Push every 12 hours  insulin lispro (ADMELOG) corrective regimen sliding scale   SubCutaneous every 6 hours  levothyroxine Injectable 80 MICROGram(s) IV Push at bedtime  midodrine 10 milliGRAM(s) Oral every 8 hours  midodrine. 20 milliGRAM(s) Oral once  multivitamin 1 Tablet(s) Oral daily  norepinephrine Infusion 0.05 MICROgram(s)/kG/Min (3.4 mL/Hr) IV Continuous <Continuous>  polyethylene glycol 3350 17 Gram(s) Oral daily  senna 2 Tablet(s) Oral at bedtime  sertraline 25 milliGRAM(s) Oral daily  sodium chloride 0.9% Bolus 1000 milliLiter(s) IV Bolus once  sodium chloride 0.9%. 1000 milliLiter(s) (100 mL/Hr) IV Continuous <Continuous>  tamoxifen 20 milliGRAM(s) Oral daily  thiamine 100 milliGRAM(s) Oral daily    MEDICATIONS  (PRN):  acetaminophen     Tablet .. 650 milliGRAM(s) Oral every 6 hours PRN Temp greater or equal to 38C (100.4F), Mild Pain (1 - 3)  dextrose Oral Gel 15 Gram(s) Oral once PRN Blood Glucose LESS THAN 70 milliGRAM(s)/deciliter  melatonin 3 milliGRAM(s) Oral at bedtime PRN Insomnia      LABS:                        8.7    15.08 )-----------( 151      ( 02 Feb 2024 11:39 )             30.6     Hgb Trend: 8.7<--, 9.2<--, 9.2<--, 10.5<--  02-02    143  |  111<H>  |  26<H>  ----------------------------<  171<H>  3.9   |  20<L>  |  0.38<L>    Ca    7.2<L>      02 Feb 2024 11:39  Phos  2.1     02-02  Mg     2.1     02-02    TPro  2.4<L>  /  Alb  1.1<L>  /  TBili  0.2  /  DBili  x   /  AST  12  /  ALT  14  /  AlkPhos  42  02-02    Creatinine Trend: 0.38<--, <0.30<--, 0.41<--, 0.39<--, 0.31<--, 0.36<--    Urinalysis Basic - ( 02 Feb 2024 11:39 )    Color: x / Appearance: x / SG: x / pH: x  Gluc: 171 mg/dL / Ketone: x  / Bili: x / Urobili: x   Blood: x / Protein: x / Nitrite: x   Leuk Esterase: x / RBC: x / WBC x   Sq Epi: x / Non Sq Epi: x / Bacteria: x      Arterial Blood Gas:  02-02 @ 17:12  7.44/29/129/20/100.0/-3.9  ABG lactate: --        MICROBIOLOGY:     RADIOLOGY & ADDITIONAL TESTS:      ASSESSMENT AND PLAN:  Mr./Mrs./Ms. is a ___    #Neuro    #Cardiovascular    #Respiratory    #GI/Nutrition    #/Renal    #Skin    #ID    #Endocrine    #Hematologic/DVT ppx    #Ethics MICU Accept Note    CHIEF COMPLAINT: hypotension    HPI / INTERVAL HISTORY:    Patient is a 68 year old F with hx of TBI, CVA (nonverbal, bedbound at baseline) hypothyroidism, L breast cancer (s/p mastectomy and breast tissue expander, on tamoxifen) who presents from [facility] for several days of lethargy, dec responsiveness, and fever. The patient was recently discharged (1/10/2024) after a prolonged hospitalization for UTI, hypernatremia, FTT, complicated by AHRF 2/2 to aspiration PNA.     In the ED, VS notable for HR , BP 70-90/50-60s, Tmax 103.4F, SpO2 100% on 2L NC. Labs remarkable for WBC 14.75, hgb 10.5, Na 151, Cl 115, and AST/ALT elevation. The patient received vancomycin, zosyn, 3L NS, 20 midodrine, 50 IV hydrocortisone, and started on mIVF with 1/2 NS. CT remarkable for sacral decubitus ulcer with possible osteomyelitis. Initially managed on vanc/zosyn -> transitioned to ancef 2/1. On 2/2 RRT called for hypotension, received 1L bolus + midodrine with minimal improvement in BPs, started on levophed.     MICU consulted for persistent hypotension in the setting of sepsis from osteomyelitis.      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      C Section      Abnormality, eye  h/o left eye vitrectomy      S/P D&C (status post dilation and curettage)      H/O left mastectomy          FAMILY HISTORY:  No pertinent family history in first degree relatives        SOCIAL HISTORY:  Smoking:   Substance Use:   EtOH Use:   Marital Status:   Sexual History:   Occupation:  Recent Travel:  Country of Birth:   Advance Directives:     HOME MEDICATIONS:      Allergies    Tapazole (Hives)    Intolerances          REVIEW OF SYSTEMS:  Constitutional: No fevers, chills, weight loss, weight gain  HEENT: No vision problems, eye pain, nasal congestion, rhinorrhea, sore throat, dysphagia  CV: No chest pain, orthopnea, palpitations  Resp: No cough, dyspnea, wheezing, hemoptysis  GI: No nausea, vomiting, diarrhea, constipation, abdominal pain  : [ ] dysuria [ ] nocturia [ ] hematuria [ ] increased urinary frequency  Musculoskeletal: [ ] back pain [ ] myalgias [ ] arthralgias [ ] fracture  Skin: [ ] rash [ ] itch  Neurological: [ ] headache [ ] dizziness [ ] syncope [ ] weakness [ ] numbness  Psychiatric: [ ] anxiety [ ] depression  Endocrine: [ ] diabetes [ ] thyroid problem  Hematologic/Lymphatic: [ ] anemia [ ] bleeding problem  Allergic/Immunologic: [ ] itchy eyes [ ] nasal discharge [ ] hives [ ] angioedema  [ ] All other systems negative  [ ] Unable to assess ROS because ________    OBJECTIVE:  ICU Vital Signs Last 24 Hrs  T(C): 36.8 (02 Feb 2024 11:42), Max: 37 (01 Feb 2024 21:07)  T(F): 98.2 (02 Feb 2024 11:42), Max: 98.6 (01 Feb 2024 21:07)  HR: 70 (02 Feb 2024 16:12) (70 - 81)  BP: 75/48 (02 Feb 2024 16:12) (75/48 - 96/65)  BP(mean): --  ABP: --  ABP(mean): --  RR: 18 (02 Feb 2024 11:42) (18 - 18)  SpO2: 98% (02 Feb 2024 05:29) (98% - 99%)    O2 Parameters below as of 02 Feb 2024 11:42  Patient On (Oxygen Delivery Method): nasal cannula              02-01 @ 07:01 - 02-02 @ 07:00  --------------------------------------------------------  IN: 1500 mL / OUT: 500 mL / NET: 1000 mL    02-02 @ 07:01  -  02-02 @ 17:19  --------------------------------------------------------  IN: 0 mL / OUT: 300 mL / NET: -300 mL      CAPILLARY BLOOD GLUCOSE      POCT Blood Glucose.: 133 mg/dL (02 Feb 2024 16:35)      PHYSICAL EXAM:  General: A&Ox0, tracks providers in the room  HEENT: moist mucous membranes  Neck: supple, JVP at 7cm  Chest/Lungs: CTAB, no WRR  Heart: RRR, JVP at 7cm   Abdomen: soft, nontender, nondistended   Extremities: contracted c/w baseline, no edema, palpable pulses  Skin: multiple pressure injuries to sacrum/right hip/buttocks, left shoulder/hip/spine, L foot  Neuro: awake, nonverbal (c/w baseline) but tracks providers in room     LINES:     HOSPITAL MEDICATIONS:  MEDICATIONS  (STANDING):  artificial  tears Solution 1 Drop(s) Both EYES two times a day  ascorbic acid 500 milliGRAM(s) Oral daily  ceFAZolin   IVPB 2000 milliGRAM(s) IV Intermittent every 8 hours  ceFAZolin   IVPB      chlorhexidine 2% Cloths 1 Application(s) Topical <User Schedule>  collagenase Ointment 1 Application(s) Topical two times a day  Dakins Solution - 1/4 Strength 1 Application(s) Topical every 12 hours  dextrose 5%. 1000 milliLiter(s) (50 mL/Hr) IV Continuous <Continuous>  dextrose 5%. 1000 milliLiter(s) (100 mL/Hr) IV Continuous <Continuous>  dextrose 50% Injectable 12.5 Gram(s) IV Push once  dextrose 50% Injectable 25 Gram(s) IV Push once  dextrose 50% Injectable 25 Gram(s) IV Push once  folic acid 1 milliGRAM(s) Oral daily  glucagon  Injectable 1 milliGRAM(s) IntraMuscular once  hydrocortisone sodium succinate Injectable 50 milliGRAM(s) IV Push every 12 hours  insulin lispro (ADMELOG) corrective regimen sliding scale   SubCutaneous every 6 hours  levothyroxine Injectable 80 MICROGram(s) IV Push at bedtime  midodrine 10 milliGRAM(s) Oral every 8 hours  midodrine. 20 milliGRAM(s) Oral once  multivitamin 1 Tablet(s) Oral daily  norepinephrine Infusion 0.05 MICROgram(s)/kG/Min (3.4 mL/Hr) IV Continuous <Continuous>  polyethylene glycol 3350 17 Gram(s) Oral daily  senna 2 Tablet(s) Oral at bedtime  sertraline 25 milliGRAM(s) Oral daily  sodium chloride 0.9% Bolus 1000 milliLiter(s) IV Bolus once  sodium chloride 0.9%. 1000 milliLiter(s) (100 mL/Hr) IV Continuous <Continuous>  tamoxifen 20 milliGRAM(s) Oral daily  thiamine 100 milliGRAM(s) Oral daily    MEDICATIONS  (PRN):  acetaminophen     Tablet .. 650 milliGRAM(s) Oral every 6 hours PRN Temp greater or equal to 38C (100.4F), Mild Pain (1 - 3)  dextrose Oral Gel 15 Gram(s) Oral once PRN Blood Glucose LESS THAN 70 milliGRAM(s)/deciliter  melatonin 3 milliGRAM(s) Oral at bedtime PRN Insomnia      LABS:                        8.7    15.08 )-----------( 151      ( 02 Feb 2024 11:39 )             30.6     Hgb Trend: 8.7<--, 9.2<--, 9.2<--, 10.5<--  02-02    143  |  111<H>  |  26<H>  ----------------------------<  171<H>  3.9   |  20<L>  |  0.38<L>    Ca    7.2<L>      02 Feb 2024 11:39  Phos  2.1     02-02  Mg     2.1     02-02    TPro  2.4<L>  /  Alb  1.1<L>  /  TBili  0.2  /  DBili  x   /  AST  12  /  ALT  14  /  AlkPhos  42  02-02    Creatinine Trend: 0.38<--, <0.30<--, 0.41<--, 0.39<--, 0.31<--, 0.36<--    Urinalysis Basic - ( 02 Feb 2024 11:39 )    Color: x / Appearance: x / SG: x / pH: x  Gluc: 171 mg/dL / Ketone: x  / Bili: x / Urobili: x   Blood: x / Protein: x / Nitrite: x   Leuk Esterase: x / RBC: x / WBC x   Sq Epi: x / Non Sq Epi: x / Bacteria: x      Arterial Blood Gas:  02-02 @ 17:12  7.44/29/129/20/100.0/-3.9  ABG lactate: --        MICROBIOLOGY:     RADIOLOGY & ADDITIONAL TESTS:      ASSESSMENT AND PLAN:  69 yo F with hx TBI (nonverbal, bedbound/contracted at baseline) s/p PEG last admission due to recurrent hypernatremia and chronic malnutrition, CVA, hypothyroidism, L breast cancer (s/p mastectomy tissue expander still in place ) who presented for decreased responsiveness and hypotension, found to have sepsis with MSSA bacteremia, likely source sacral ulcer w/ c/f possible osteomyelitis. Now being transferred to MICU for hypotension 2/2 likely septic shock, requiring pressors.     #Neuro  - AOx0, nonverbal c/w baseline mental status    #Cardiovascular  > Shock, likely septic ISO sacral decub ulcer  - on midodrine 5 mg q8h  - now requiring levophed  - sepsis workup/tx as outlined in ID plan    > QT prolongation  - QTc 561  - K>4, Mg>2  - avoid QT prolonging agents    #Respiratory  - on RA, respiratory status at baseline  - no focal consolidation on CXR, CTA neg for PE     #GI/Nutrition  > s/p PEG  - diet NPO w/ TFs    > constipation, chronic  - cont miralax, senna    #/Renal  - renal function at baseline, monitor for JAVIER ISO hemodynamic instability  - strict I/Os    #Skin  > sacral wounds vs OM  - MRI spine with prominent sacral decubitus ulcer extending to the coccygeal tip without evidence of underlying osteomyelitis or discrete fluid collections in the surrounding soft tissues  - blood cultures growing MSSA   - wound care following, appreciate recs  - continue cefazolin  - f/u wound cultures    #ID  > sepsis  > MSSA bacteremia  > sacral wound, likely source of sepsis/bacteremia  - WBC 15k, hypotensive 2/2 likely septic shock  - ABG lactate 2.0  - continue cefazolin  - f/u repeat blood cultures, UA, wound cx  - repeat blood cx q48h until clear  - f/u TTE     #Endocrine  > hypothyroidism  - continue levothyroxine     #Heme/onc/DVT ppx  > breast cancer  - continue tamoxifen    > DVT  - heparin SQ     #Ethics  - code status: FULL MICU Accept Note    CHIEF COMPLAINT: hypotension    HPI / INTERVAL HISTORY:    Patient is a 68 year old F with hx of TBI, CVA (nonverbal, bedbound at baseline) hypothyroidism, L breast cancer (s/p mastectomy and breast tissue expander, on tamoxifen) who presents from [facility] for several days of lethargy, dec responsiveness, and fever. The patient was recently discharged (1/10/2024) after a prolonged hospitalization for UTI, hypernatremia, FTT, complicated by AHRF 2/2 to aspiration PNA.     In the ED, VS notable for HR , BP 70-90/50-60s, Tmax 103.4F, SpO2 100% on 2L NC. Labs remarkable for WBC 14.75, hgb 10.5, Na 151, Cl 115, and AST/ALT elevation. The patient received vancomycin, zosyn, 3L NS, 20 midodrine, 50 IV hydrocortisone, and started on mIVF with 1/2 NS. CT remarkable for sacral decubitus ulcer with possible osteomyelitis. Initially managed on vanc/zosyn -> transitioned to ancef 2/1. On 2/2 RRT called for hypotension, received 1L bolus + midodrine with minimal improvement in BPs, started on levophed.     MICU consulted for persistent hypotension in the setting of sepsis from sacral ulcer w/ MSSA bacteremia.       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      C Section      Abnormality, eye  h/o left eye vitrectomy      S/P D&C (status post dilation and curettage)      H/O left mastectomy          FAMILY HISTORY:  No pertinent family history in first degree relatives        SOCIAL HISTORY:  Smoking:   Substance Use:   EtOH Use:   Marital Status:   Sexual History:   Occupation:  Recent Travel:  Country of Birth:   Advance Directives:     HOME MEDICATIONS:      Allergies    Tapazole (Hives)    Intolerances          REVIEW OF SYSTEMS:  Constitutional: No fevers, chills, weight loss, weight gain  HEENT: No vision problems, eye pain, nasal congestion, rhinorrhea, sore throat, dysphagia  CV: No chest pain, orthopnea, palpitations  Resp: No cough, dyspnea, wheezing, hemoptysis  GI: No nausea, vomiting, diarrhea, constipation, abdominal pain  : [ ] dysuria [ ] nocturia [ ] hematuria [ ] increased urinary frequency  Musculoskeletal: [ ] back pain [ ] myalgias [ ] arthralgias [ ] fracture  Skin: [ ] rash [ ] itch  Neurological: [ ] headache [ ] dizziness [ ] syncope [ ] weakness [ ] numbness  Psychiatric: [ ] anxiety [ ] depression  Endocrine: [ ] diabetes [ ] thyroid problem  Hematologic/Lymphatic: [ ] anemia [ ] bleeding problem  Allergic/Immunologic: [ ] itchy eyes [ ] nasal discharge [ ] hives [ ] angioedema  [ ] All other systems negative  [ ] Unable to assess ROS because ________    OBJECTIVE:  ICU Vital Signs Last 24 Hrs  T(C): 36.8 (02 Feb 2024 11:42), Max: 37 (01 Feb 2024 21:07)  T(F): 98.2 (02 Feb 2024 11:42), Max: 98.6 (01 Feb 2024 21:07)  HR: 70 (02 Feb 2024 16:12) (70 - 81)  BP: 75/48 (02 Feb 2024 16:12) (75/48 - 96/65)  BP(mean): --  ABP: --  ABP(mean): --  RR: 18 (02 Feb 2024 11:42) (18 - 18)  SpO2: 98% (02 Feb 2024 05:29) (98% - 99%)    O2 Parameters below as of 02 Feb 2024 11:42  Patient On (Oxygen Delivery Method): nasal cannula              02-01 @ 07:01 - 02-02 @ 07:00  --------------------------------------------------------  IN: 1500 mL / OUT: 500 mL / NET: 1000 mL    02-02 @ 07:01  -  02-02 @ 17:19  --------------------------------------------------------  IN: 0 mL / OUT: 300 mL / NET: -300 mL      CAPILLARY BLOOD GLUCOSE      POCT Blood Glucose.: 133 mg/dL (02 Feb 2024 16:35)      PHYSICAL EXAM:  General: A&Ox0, tracks providers in the room  HEENT: moist mucous membranes  Neck: supple, JVP at 7cm  Chest/Lungs: CTAB, no WRR  Heart: RRR, JVP at 7cm   Abdomen: soft, nontender, nondistended   Extremities: contracted c/w baseline, no edema, palpable pulses  Skin: multiple pressure injuries to sacrum/right hip/buttocks, left shoulder/hip/spine, L foot  Neuro: awake, nonverbal (c/w baseline) but tracks providers in room     LINES:     HOSPITAL MEDICATIONS:  MEDICATIONS  (STANDING):  artificial  tears Solution 1 Drop(s) Both EYES two times a day  ascorbic acid 500 milliGRAM(s) Oral daily  ceFAZolin   IVPB 2000 milliGRAM(s) IV Intermittent every 8 hours  ceFAZolin   IVPB      chlorhexidine 2% Cloths 1 Application(s) Topical <User Schedule>  collagenase Ointment 1 Application(s) Topical two times a day  Dakins Solution - 1/4 Strength 1 Application(s) Topical every 12 hours  dextrose 5%. 1000 milliLiter(s) (50 mL/Hr) IV Continuous <Continuous>  dextrose 5%. 1000 milliLiter(s) (100 mL/Hr) IV Continuous <Continuous>  dextrose 50% Injectable 12.5 Gram(s) IV Push once  dextrose 50% Injectable 25 Gram(s) IV Push once  dextrose 50% Injectable 25 Gram(s) IV Push once  folic acid 1 milliGRAM(s) Oral daily  glucagon  Injectable 1 milliGRAM(s) IntraMuscular once  hydrocortisone sodium succinate Injectable 50 milliGRAM(s) IV Push every 12 hours  insulin lispro (ADMELOG) corrective regimen sliding scale   SubCutaneous every 6 hours  levothyroxine Injectable 80 MICROGram(s) IV Push at bedtime  midodrine 10 milliGRAM(s) Oral every 8 hours  midodrine. 20 milliGRAM(s) Oral once  multivitamin 1 Tablet(s) Oral daily  norepinephrine Infusion 0.05 MICROgram(s)/kG/Min (3.4 mL/Hr) IV Continuous <Continuous>  polyethylene glycol 3350 17 Gram(s) Oral daily  senna 2 Tablet(s) Oral at bedtime  sertraline 25 milliGRAM(s) Oral daily  sodium chloride 0.9% Bolus 1000 milliLiter(s) IV Bolus once  sodium chloride 0.9%. 1000 milliLiter(s) (100 mL/Hr) IV Continuous <Continuous>  tamoxifen 20 milliGRAM(s) Oral daily  thiamine 100 milliGRAM(s) Oral daily    MEDICATIONS  (PRN):  acetaminophen     Tablet .. 650 milliGRAM(s) Oral every 6 hours PRN Temp greater or equal to 38C (100.4F), Mild Pain (1 - 3)  dextrose Oral Gel 15 Gram(s) Oral once PRN Blood Glucose LESS THAN 70 milliGRAM(s)/deciliter  melatonin 3 milliGRAM(s) Oral at bedtime PRN Insomnia      LABS:                        8.7    15.08 )-----------( 151      ( 02 Feb 2024 11:39 )             30.6     Hgb Trend: 8.7<--, 9.2<--, 9.2<--, 10.5<--  02-02    143  |  111<H>  |  26<H>  ----------------------------<  171<H>  3.9   |  20<L>  |  0.38<L>    Ca    7.2<L>      02 Feb 2024 11:39  Phos  2.1     02-02  Mg     2.1     02-02    TPro  2.4<L>  /  Alb  1.1<L>  /  TBili  0.2  /  DBili  x   /  AST  12  /  ALT  14  /  AlkPhos  42  02-02    Creatinine Trend: 0.38<--, <0.30<--, 0.41<--, 0.39<--, 0.31<--, 0.36<--    Urinalysis Basic - ( 02 Feb 2024 11:39 )    Color: x / Appearance: x / SG: x / pH: x  Gluc: 171 mg/dL / Ketone: x  / Bili: x / Urobili: x   Blood: x / Protein: x / Nitrite: x   Leuk Esterase: x / RBC: x / WBC x   Sq Epi: x / Non Sq Epi: x / Bacteria: x      Arterial Blood Gas:  02-02 @ 17:12  7.44/29/129/20/100.0/-3.9  ABG lactate: --        MICROBIOLOGY:     RADIOLOGY & ADDITIONAL TESTS:      ASSESSMENT AND PLAN:  67 yo F with hx TBI (nonverbal, bedbound/contracted at baseline) s/p PEG last admission due to recurrent hypernatremia and chronic malnutrition, CVA, hypothyroidism, L breast cancer (s/p mastectomy tissue expander still in place ) who presented for decreased responsiveness and hypotension, found to have sepsis with MSSA bacteremia, likely source sacral ulcer w/ c/f possible osteomyelitis. Now being transferred to MICU for hypotension 2/2 likely septic shock, requiring pressors.     #Neuro  - AOx0, nonverbal c/w baseline mental status    #Cardiovascular  > Shock, likely septic ISO sacral decub ulcer  - on midodrine 5 mg q8h  - now requiring levophed  - sepsis workup/tx as outlined in ID plan    > QT prolongation  - QTc 561  - K>4, Mg>2  - avoid QT prolonging agents    #Respiratory  - on RA, respiratory status at baseline  - no focal consolidation on CXR, CTA neg for PE     #GI/Nutrition  > s/p PEG  - diet NPO w/ TFs    > constipation, chronic  - cont miralax, senna    #/Renal  - renal function at baseline, monitor for AJVIER ISO hemodynamic instability  - strict I/Os    #Skin  > sacral wounds vs OM  - MRI spine with prominent sacral decubitus ulcer extending to the coccygeal tip without evidence of underlying osteomyelitis or discrete fluid collections in the surrounding soft tissues  - blood cultures growing MSSA   - wound care following, appreciate recs  - continue cefazolin  - f/u wound cultures    #ID  > sepsis  > MSSA bacteremia  > sacral wound, likely source of sepsis/bacteremia  - WBC 15k, hypotensive 2/2 likely septic shock  - ABG lactate 2.0  - continue cefazolin  - f/u repeat blood cultures, UA, wound cx  - repeat blood cx q48h until clear  - f/u TTE     #Endocrine  > hypothyroidism  - continue levothyroxine     #Heme/onc/DVT ppx  > breast cancer  - continue tamoxifen    > DVT  - heparin SQ     #Ethics  - code status: FULL MICU Accept Note    CHIEF COMPLAINT: hypotension    HPI / INTERVAL HISTORY:    Patient is a 68 year old F with hx of TBI, CVA (nonverbal, bedbound at baseline) hypothyroidism, L breast cancer (s/p mastectomy and breast tissue expander, on tamoxifen) who presents from [facility] for several days of lethargy, dec responsiveness, and fever. The patient was recently discharged (1/10/2024) after a prolonged hospitalization for UTI, hypernatremia, FTT, complicated by AHRF 2/2 to aspiration PNA.     In the ED, VS notable for HR , BP 70-90/50-60s, Tmax 103.4F, SpO2 100% on 2L NC. Labs remarkable for WBC 14.75, hgb 10.5, Na 151, Cl 115, and AST/ALT elevation. The patient received vancomycin, zosyn, 3L NS, 20 midodrine, 50 IV hydrocortisone, and started on mIVF with 1/2 NS. CT remarkable for sacral decubitus ulcer with possible osteomyelitis. Initially managed on vanc/zosyn -> transitioned to ancef 2/1. On 2/2 RRT called for hypotension, received 1L bolus + midodrine with minimal improvement in BPs, started on levophed.     MICU consulted for persistent hypotension in the setting of sepsis from sacral ulcer w/ MSSA bacteremia.       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      C Section      Abnormality, eye  h/o left eye vitrectomy      S/P D&C (status post dilation and curettage)      H/O left mastectomy          FAMILY HISTORY:  No pertinent family history in first degree relatives        SOCIAL HISTORY:  Smoking:   Substance Use:   EtOH Use:   Marital Status:   Sexual History:   Occupation:  Recent Travel:  Country of Birth:   Advance Directives:     HOME MEDICATIONS:      Allergies    Tapazole (Hives)    Intolerances          REVIEW OF SYSTEMS:  Constitutional: No fevers, chills, weight loss, weight gain  HEENT: No vision problems, eye pain, nasal congestion, rhinorrhea, sore throat, dysphagia  CV: No chest pain, orthopnea, palpitations  Resp: No cough, dyspnea, wheezing, hemoptysis  GI: No nausea, vomiting, diarrhea, constipation, abdominal pain  : [ ] dysuria [ ] nocturia [ ] hematuria [ ] increased urinary frequency  Musculoskeletal: [ ] back pain [ ] myalgias [ ] arthralgias [ ] fracture  Skin: [ ] rash [ ] itch  Neurological: [ ] headache [ ] dizziness [ ] syncope [ ] weakness [ ] numbness  Psychiatric: [ ] anxiety [ ] depression  Endocrine: [ ] diabetes [ ] thyroid problem  Hematologic/Lymphatic: [ ] anemia [ ] bleeding problem  Allergic/Immunologic: [ ] itchy eyes [ ] nasal discharge [ ] hives [ ] angioedema  [ ] All other systems negative  [ ] Unable to assess ROS because ________    OBJECTIVE:  ICU Vital Signs Last 24 Hrs  T(C): 36.8 (02 Feb 2024 11:42), Max: 37 (01 Feb 2024 21:07)  T(F): 98.2 (02 Feb 2024 11:42), Max: 98.6 (01 Feb 2024 21:07)  HR: 70 (02 Feb 2024 16:12) (70 - 81)  BP: 75/48 (02 Feb 2024 16:12) (75/48 - 96/65)  BP(mean): --  ABP: --  ABP(mean): --  RR: 18 (02 Feb 2024 11:42) (18 - 18)  SpO2: 98% (02 Feb 2024 05:29) (98% - 99%)    O2 Parameters below as of 02 Feb 2024 11:42  Patient On (Oxygen Delivery Method): nasal cannula              02-01 @ 07:01 - 02-02 @ 07:00  --------------------------------------------------------  IN: 1500 mL / OUT: 500 mL / NET: 1000 mL    02-02 @ 07:01  -  02-02 @ 17:19  --------------------------------------------------------  IN: 0 mL / OUT: 300 mL / NET: -300 mL      CAPILLARY BLOOD GLUCOSE      POCT Blood Glucose.: 133 mg/dL (02 Feb 2024 16:35)      PHYSICAL EXAM:  General: A&Ox0, tracks providers in the room  HEENT: moist mucous membranes  Neck: supple, JVP at 7cm  Chest/Lungs: CTAB, no WRR  Heart: RRR, JVP at 7cm   Abdomen: soft, nontender, nondistended   Extremities: contracted c/w baseline, no edema, palpable pulses  Skin: multiple pressure injuries to sacrum/right hip/buttocks, left shoulder/hip/spine, L foot  Neuro: awake, nonverbal (c/w baseline) but tracks providers in room     LINES:     HOSPITAL MEDICATIONS:  MEDICATIONS  (STANDING):  artificial  tears Solution 1 Drop(s) Both EYES two times a day  ascorbic acid 500 milliGRAM(s) Oral daily  ceFAZolin   IVPB 2000 milliGRAM(s) IV Intermittent every 8 hours  ceFAZolin   IVPB      chlorhexidine 2% Cloths 1 Application(s) Topical <User Schedule>  collagenase Ointment 1 Application(s) Topical two times a day  Dakins Solution - 1/4 Strength 1 Application(s) Topical every 12 hours  dextrose 5%. 1000 milliLiter(s) (50 mL/Hr) IV Continuous <Continuous>  dextrose 5%. 1000 milliLiter(s) (100 mL/Hr) IV Continuous <Continuous>  dextrose 50% Injectable 12.5 Gram(s) IV Push once  dextrose 50% Injectable 25 Gram(s) IV Push once  dextrose 50% Injectable 25 Gram(s) IV Push once  folic acid 1 milliGRAM(s) Oral daily  glucagon  Injectable 1 milliGRAM(s) IntraMuscular once  hydrocortisone sodium succinate Injectable 50 milliGRAM(s) IV Push every 12 hours  insulin lispro (ADMELOG) corrective regimen sliding scale   SubCutaneous every 6 hours  levothyroxine Injectable 80 MICROGram(s) IV Push at bedtime  midodrine 10 milliGRAM(s) Oral every 8 hours  midodrine. 20 milliGRAM(s) Oral once  multivitamin 1 Tablet(s) Oral daily  norepinephrine Infusion 0.05 MICROgram(s)/kG/Min (3.4 mL/Hr) IV Continuous <Continuous>  polyethylene glycol 3350 17 Gram(s) Oral daily  senna 2 Tablet(s) Oral at bedtime  sertraline 25 milliGRAM(s) Oral daily  sodium chloride 0.9% Bolus 1000 milliLiter(s) IV Bolus once  sodium chloride 0.9%. 1000 milliLiter(s) (100 mL/Hr) IV Continuous <Continuous>  tamoxifen 20 milliGRAM(s) Oral daily  thiamine 100 milliGRAM(s) Oral daily    MEDICATIONS  (PRN):  acetaminophen     Tablet .. 650 milliGRAM(s) Oral every 6 hours PRN Temp greater or equal to 38C (100.4F), Mild Pain (1 - 3)  dextrose Oral Gel 15 Gram(s) Oral once PRN Blood Glucose LESS THAN 70 milliGRAM(s)/deciliter  melatonin 3 milliGRAM(s) Oral at bedtime PRN Insomnia      LABS:                        8.7    15.08 )-----------( 151      ( 02 Feb 2024 11:39 )             30.6     Hgb Trend: 8.7<--, 9.2<--, 9.2<--, 10.5<--  02-02    143  |  111<H>  |  26<H>  ----------------------------<  171<H>  3.9   |  20<L>  |  0.38<L>    Ca    7.2<L>      02 Feb 2024 11:39  Phos  2.1     02-02  Mg     2.1     02-02    TPro  2.4<L>  /  Alb  1.1<L>  /  TBili  0.2  /  DBili  x   /  AST  12  /  ALT  14  /  AlkPhos  42  02-02    Creatinine Trend: 0.38<--, <0.30<--, 0.41<--, 0.39<--, 0.31<--, 0.36<--    Urinalysis Basic - ( 02 Feb 2024 11:39 )    Color: x / Appearance: x / SG: x / pH: x  Gluc: 171 mg/dL / Ketone: x  / Bili: x / Urobili: x   Blood: x / Protein: x / Nitrite: x   Leuk Esterase: x / RBC: x / WBC x   Sq Epi: x / Non Sq Epi: x / Bacteria: x      Arterial Blood Gas:  02-02 @ 17:12  7.44/29/129/20/100.0/-3.9  ABG lactate: --        MICROBIOLOGY:     RADIOLOGY & ADDITIONAL TESTS:      ASSESSMENT AND PLAN:  67 yo F with hx TBI (nonverbal, bedbound/contracted at baseline) s/p PEG last admission due to recurrent hypernatremia and chronic malnutrition, CVA, hypothyroidism, L breast cancer (s/p mastectomy tissue expander still in place ) who presented for decreased responsiveness and hypotension, found to have sepsis with MSSA bacteremia, likely source sacral ulcer w/ c/f possible osteomyelitis. Now being transferred to MICU for hypotension 2/2 likely septic shock, requiring pressors.     #Neuro  - AOx0, nonverbal c/w baseline mental status    #Cardiovascular  > Shock, likely septic ISO sacral decub ulcer  - on midodrine 5 mg q8h  - now requiring levophed  - sepsis workup/tx as outlined in ID plan    > QT prolongation  - QTc 561  - K>4, Mg>2  - avoid QT prolonging agents    #Respiratory  - on RA, respiratory status at baseline  - no focal consolidation on CXR, CTA neg for PE     #GI/Nutrition  > s/p PEG  - diet NPO w/ TFs    > constipation, chronic  - cont miralax, senna    #/Renal  - renal function at baseline, monitor for JAVIER ISO hemodynamic instability  - strict I/Os    #Skin  > sacral wounds vs OM  - MRI spine with prominent sacral decubitus ulcer extending to the coccygeal tip without evidence of underlying osteomyelitis or discrete fluid collections in the surrounding soft tissues  - blood cultures growing MSSA   - wound care following, appreciate recs  - continue cefazolin  - f/u wound cultures    #ID  > sepsis  > MSSA bacteremia  > sacral wound, likely source of sepsis/bacteremia  - WBC 15k, hypotensive 2/2 likely septic shock  - ABG lactate 2.0  - continue cefazolin  - f/u repeat blood cultures, UA, wound cx  - repeat blood cx q48h until clear  - f/u TTE     #Endocrine  > hypothyroidism  - continue levothyroxine     #Heme/onc/DVT ppx  > breast cancer  - continue tamoxifen    > DVT  - heparin SQ     #Ethics  - code status: FULL       Critical Care Attending Attestation:   68F Hx TBI, CVA, Contracture, Aspiration s/p PEG, Chronic Spangler, Bedbound, Non-Verbal, Multiple Decubital Wounds Recurrent Hypernatremia, Malnutrition, Lt Breast CA Resection admitted 1/31 from Nursing Home for Unresponsiveness and Hypotension treated with Ancef for MSSA Bacteremia and Decubital Wound Sepsis in RRT for Hypotension again unresponsive to IV Fluid Bolus and started on Pressor.   - A&O x 0 baseline Bed Bound/Non-Verbal, Contracture   - Minimal NCO2 vs. RAO2 with SpO2 98%   - Titrate Pressor to MAP >65 mmHg   - Bedside POCUS, EKG, Jose and TTE   - Septic W/U, MSSA Bacteremia and UTI coverage   - Wound Care and Nutritional Consult    - Enteral Feed via PEG as tolerated for PCM/ Malabsorption   - Hypernatremia resolved to f/u I&O   - DVT PPx - SQH   - GOC - Full Code       Patient seen and examined with ICU Resident/Fellow at bedside after lab data, medical records and radiology reports reviewed. I have read and agreeable in general with resident's Documented Note, Assessment and Management Plan which reflected my opinions from bedside round and discussion.   Total Critical Care Time = 45 Min excluding teaching and procedure activity. MICU Accept Note    CHIEF COMPLAINT: hypotension    HPI / INTERVAL HISTORY:    Patient is a 68 year old F with hx of TBI, CVA (nonverbal, bedbound at baseline) hypothyroidism, L breast cancer (s/p mastectomy and breast tissue expander, on tamoxifen) who presents from [facility] for several days of lethargy, dec responsiveness, and fever. The patient was recently discharged (1/10/2024) after a prolonged hospitalization for UTI, hypernatremia, FTT, complicated by AHRF 2/2 to aspiration PNA.     In the ED, VS notable for HR , BP 70-90/50-60s, Tmax 103.4F, SpO2 100% on 2L NC. Labs remarkable for WBC 14.75, hgb 10.5, Na 151, Cl 115, and AST/ALT elevation. The patient received vancomycin, zosyn, 3L NS, 20 midodrine, 50 IV hydrocortisone, and started on mIVF with 1/2 NS. CT remarkable for sacral decubitus ulcer with possible osteomyelitis. Initially managed on vanc/zosyn -> transitioned to ancef 2/1. On 2/2 RRT called for hypotension, received 1L bolus + midodrine with minimal improvement in BPs, started on levophed.     MICU consulted for persistent hypotension in the setting of sepsis from sacral ulcer w/ MSSA bacteremia.       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      C Section      Abnormality, eye  h/o left eye vitrectomy      S/P D&C (status post dilation and curettage)      H/O left mastectomy          FAMILY HISTORY:  No pertinent family history in first degree relatives        SOCIAL HISTORY:  Smoking:   Substance Use:   EtOH Use:   Marital Status:   Sexual History:   Occupation:  Recent Travel:  Country of Birth:   Advance Directives:     HOME MEDICATIONS:      Allergies    Tapazole (Hives)    Intolerances          REVIEW OF SYSTEMS:  Constitutional: No fevers, chills, weight loss, weight gain  HEENT: No vision problems, eye pain, nasal congestion, rhinorrhea, sore throat, dysphagia  CV: No chest pain, orthopnea, palpitations  Resp: No cough, dyspnea, wheezing, hemoptysis  GI: No nausea, vomiting, diarrhea, constipation, abdominal pain  : [ ] dysuria [ ] nocturia [ ] hematuria [ ] increased urinary frequency  Musculoskeletal: [ ] back pain [ ] myalgias [ ] arthralgias [ ] fracture  Skin: [ ] rash [ ] itch  Neurological: [ ] headache [ ] dizziness [ ] syncope [ ] weakness [ ] numbness  Psychiatric: [ ] anxiety [ ] depression  Endocrine: [ ] diabetes [ ] thyroid problem  Hematologic/Lymphatic: [ ] anemia [ ] bleeding problem  Allergic/Immunologic: [ ] itchy eyes [ ] nasal discharge [ ] hives [ ] angioedema  [ ] All other systems negative  [ ] Unable to assess ROS because ________    OBJECTIVE:  ICU Vital Signs Last 24 Hrs  T(C): 36.8 (02 Feb 2024 11:42), Max: 37 (01 Feb 2024 21:07)  T(F): 98.2 (02 Feb 2024 11:42), Max: 98.6 (01 Feb 2024 21:07)  HR: 70 (02 Feb 2024 16:12) (70 - 81)  BP: 75/48 (02 Feb 2024 16:12) (75/48 - 96/65)  BP(mean): --  ABP: --  ABP(mean): --  RR: 18 (02 Feb 2024 11:42) (18 - 18)  SpO2: 98% (02 Feb 2024 05:29) (98% - 99%)    O2 Parameters below as of 02 Feb 2024 11:42  Patient On (Oxygen Delivery Method): nasal cannula              02-01 @ 07:01 - 02-02 @ 07:00  --------------------------------------------------------  IN: 1500 mL / OUT: 500 mL / NET: 1000 mL    02-02 @ 07:01  -  02-02 @ 17:19  --------------------------------------------------------  IN: 0 mL / OUT: 300 mL / NET: -300 mL      CAPILLARY BLOOD GLUCOSE      POCT Blood Glucose.: 133 mg/dL (02 Feb 2024 16:35)      PHYSICAL EXAM:  General: A&Ox0, tracks providers in the room  HEENT: moist mucous membranes  Neck: supple, JVP at 7cm  Chest/Lungs: CTAB, no WRR  Heart: RRR, JVP at 7cm   Abdomen: soft, nontender, nondistended   Extremities: contracted c/w baseline, no edema, palpable pulses  Skin: multiple pressure injuries to sacrum/right hip/buttocks, left shoulder/hip/spine, L foot  Neuro: awake, nonverbal (c/w baseline) but tracks providers in room     LINES:     HOSPITAL MEDICATIONS:  MEDICATIONS  (STANDING):  artificial  tears Solution 1 Drop(s) Both EYES two times a day  ascorbic acid 500 milliGRAM(s) Oral daily  ceFAZolin   IVPB 2000 milliGRAM(s) IV Intermittent every 8 hours  ceFAZolin   IVPB      chlorhexidine 2% Cloths 1 Application(s) Topical <User Schedule>  collagenase Ointment 1 Application(s) Topical two times a day  Dakins Solution - 1/4 Strength 1 Application(s) Topical every 12 hours  dextrose 5%. 1000 milliLiter(s) (50 mL/Hr) IV Continuous <Continuous>  dextrose 5%. 1000 milliLiter(s) (100 mL/Hr) IV Continuous <Continuous>  dextrose 50% Injectable 12.5 Gram(s) IV Push once  dextrose 50% Injectable 25 Gram(s) IV Push once  dextrose 50% Injectable 25 Gram(s) IV Push once  folic acid 1 milliGRAM(s) Oral daily  glucagon  Injectable 1 milliGRAM(s) IntraMuscular once  hydrocortisone sodium succinate Injectable 50 milliGRAM(s) IV Push every 12 hours  insulin lispro (ADMELOG) corrective regimen sliding scale   SubCutaneous every 6 hours  levothyroxine Injectable 80 MICROGram(s) IV Push at bedtime  midodrine 10 milliGRAM(s) Oral every 8 hours  midodrine. 20 milliGRAM(s) Oral once  multivitamin 1 Tablet(s) Oral daily  norepinephrine Infusion 0.05 MICROgram(s)/kG/Min (3.4 mL/Hr) IV Continuous <Continuous>  polyethylene glycol 3350 17 Gram(s) Oral daily  senna 2 Tablet(s) Oral at bedtime  sertraline 25 milliGRAM(s) Oral daily  sodium chloride 0.9% Bolus 1000 milliLiter(s) IV Bolus once  sodium chloride 0.9%. 1000 milliLiter(s) (100 mL/Hr) IV Continuous <Continuous>  tamoxifen 20 milliGRAM(s) Oral daily  thiamine 100 milliGRAM(s) Oral daily    MEDICATIONS  (PRN):  acetaminophen     Tablet .. 650 milliGRAM(s) Oral every 6 hours PRN Temp greater or equal to 38C (100.4F), Mild Pain (1 - 3)  dextrose Oral Gel 15 Gram(s) Oral once PRN Blood Glucose LESS THAN 70 milliGRAM(s)/deciliter  melatonin 3 milliGRAM(s) Oral at bedtime PRN Insomnia      LABS:                        8.7    15.08 )-----------( 151      ( 02 Feb 2024 11:39 )             30.6     Hgb Trend: 8.7<--, 9.2<--, 9.2<--, 10.5<--  02-02    143  |  111<H>  |  26<H>  ----------------------------<  171<H>  3.9   |  20<L>  |  0.38<L>    Ca    7.2<L>      02 Feb 2024 11:39  Phos  2.1     02-02  Mg     2.1     02-02    TPro  2.4<L>  /  Alb  1.1<L>  /  TBili  0.2  /  DBili  x   /  AST  12  /  ALT  14  /  AlkPhos  42  02-02    Creatinine Trend: 0.38<--, <0.30<--, 0.41<--, 0.39<--, 0.31<--, 0.36<--    Urinalysis Basic - ( 02 Feb 2024 11:39 )    Color: x / Appearance: x / SG: x / pH: x  Gluc: 171 mg/dL / Ketone: x  / Bili: x / Urobili: x   Blood: x / Protein: x / Nitrite: x   Leuk Esterase: x / RBC: x / WBC x   Sq Epi: x / Non Sq Epi: x / Bacteria: x      Arterial Blood Gas:  02-02 @ 17:12  7.44/29/129/20/100.0/-3.9  ABG lactate: --        MICROBIOLOGY:     RADIOLOGY & ADDITIONAL TESTS:      ASSESSMENT AND PLAN:  67 yo F with hx TBI (nonverbal, bedbound/contracted at baseline) s/p PEG last admission due to recurrent hypernatremia and chronic malnutrition, CVA, hypothyroidism, L breast cancer (s/p mastectomy tissue expander still in place ) who presented for decreased responsiveness and hypotension, found to have sepsis with MSSA bacteremia, likely source sacral ulcer w/ c/f possible osteomyelitis. Now being transferred to MICU for hypotension 2/2 likely septic shock, requiring pressors.     #Neuro  - AOx0, nonverbal c/w baseline mental status    #Cardiovascular  > Shock, likely septic ISO sacral decub ulcer  - on midodrine 5 mg q8h  - now requiring levophed  - sepsis workup/tx as outlined in ID plan    > QT prolongation  - QTc 561  - K>4, Mg>2  - avoid QT prolonging agents    #Respiratory  - on 2-3L NC, SpO2 appropriate  - no focal consolidation on CXR, CTA neg for PE     #GI/Nutrition  > s/p PEG  - diet NPO w/ TFs    > constipation, chronic  - cont miralax, senna    #/Renal  - renal function at baseline, monitor for JAVIER ISO hemodynamic instability  - strict I/Os    #Skin  > sacral wounds vs OM  - MRI spine with prominent sacral decubitus ulcer extending to the coccygeal tip without evidence of underlying osteomyelitis or discrete fluid collections in the surrounding soft tissues  - blood cultures growing MSSA   - wound care following, appreciate recs  - continue cefazolin  - f/u wound cultures    #ID  > sepsis  > MSSA bacteremia  > sacral wound, likely source of sepsis/bacteremia  - WBC 15k, hypotensive 2/2 likely septic shock  - ABG lactate 2.0  - continue cefazolin  - f/u repeat blood cultures, UA, wound cx  - repeat blood cx q48h until clear  - f/u TTE     #Endocrine  > hypothyroidism  - continue levothyroxine     #Heme/onc/DVT ppx  > breast cancer  - continue tamoxifen    > DVT  - heparin SQ     #Ethics  - code status: FULL       Critical Care Attending Attestation:   68F Hx TBI, CVA, Contracture, Aspiration s/p PEG, Chronic Spangler, Bedbound, Non-Verbal, Multiple Decubital Wounds Recurrent Hypernatremia, Malnutrition, Lt Breast CA Resection admitted 1/31 from Nursing Home for Unresponsiveness and Hypotension treated with Ancef for MSSA Bacteremia and Decubital Wound Sepsis in RRT for Hypotension again unresponsive to IV Fluid Bolus and started on Pressor.   - A&O x 0 baseline Bed Bound/Non-Verbal, Contracture   - Minimal NCO2 vs. RAO2 with SpO2 98%   - Titrate Pressor to MAP >65 mmHg   - Bedside POCUS, EKG, Jose and TTE   - Septic W/U, MSSA Bacteremia and UTI coverage   - Wound Care and Nutritional Consult    - Enteral Feed via PEG as tolerated for PCM/ Malabsorption   - Hypernatremia resolved to f/u I&O   - DVT PPx - SQH   - GOC - Full Code       Patient seen and examined with ICU Resident/Fellow at bedside after lab data, medical records and radiology reports reviewed. I have read and agreeable in general with resident's Documented Note, Assessment and Management Plan which reflected my opinions from bedside round and discussion.   Total Critical Care Time = 45 Min excluding teaching and procedure activity.

## 2024-02-02 NOTE — PROGRESS NOTE ADULT - PROBLEM SELECTOR PLAN 4
- Na of 151, likely 2/2 to dehydration and malnutrition from PEG tube now improved  - continue NS IVF  - Free water flushed of 60cc q4h  - PEG tube placed 1/11  - continue tube feeds of jevity 1.2 @10 and increase to 50 total  - Nutrition consult  - BMP q12

## 2024-02-02 NOTE — PROGRESS NOTE ADULT - SUBJECTIVE AND OBJECTIVE BOX
Follow Up:  sepsis    Interval History/ROS: pt afebrile but hypotensive and low BP, lost her IV access s/p new one    ROS:    Unobtainable because: non verbal        Allergies  Tapazole (Hives)        ANTIMICROBIALS:  ceFAZolin   IVPB    ceFAZolin   IVPB 2000 every 8 hours      OTHER MEDS:  acetaminophen     Tablet .. 650 milliGRAM(s) Oral every 6 hours PRN  ascorbic acid 500 milliGRAM(s) Oral daily  chlorhexidine 2% Cloths 1 Application(s) Topical <User Schedule>  collagenase Ointment 1 Application(s) Topical two times a day  Dakins Solution - 1/4 Strength 1 Application(s) Topical every 12 hours  dextrose 5%. 1000 milliLiter(s) IV Continuous <Continuous>  dextrose 5%. 1000 milliLiter(s) IV Continuous <Continuous>  dextrose 50% Injectable 12.5 Gram(s) IV Push once  dextrose 50% Injectable 25 Gram(s) IV Push once  dextrose 50% Injectable 25 Gram(s) IV Push once  dextrose Oral Gel 15 Gram(s) Oral once PRN  folic acid 1 milliGRAM(s) Oral daily  glucagon  Injectable 1 milliGRAM(s) IntraMuscular once  hydrocortisone sodium succinate Injectable 50 milliGRAM(s) IV Push every 12 hours  insulin lispro (ADMELOG) corrective regimen sliding scale   SubCutaneous every 6 hours  levothyroxine Injectable 80 MICROGram(s) IV Push at bedtime  melatonin 3 milliGRAM(s) Oral at bedtime PRN  midodrine 10 milliGRAM(s) Oral every 8 hours  multivitamin 1 Tablet(s) Oral daily  polyethylene glycol 3350 17 Gram(s) Oral daily  senna 2 Tablet(s) Oral at bedtime  sertraline 25 milliGRAM(s) Oral daily  sodium chloride 0.9%. 1000 milliLiter(s) IV Continuous <Continuous>  tamoxifen 20 milliGRAM(s) Oral daily  thiamine 100 milliGRAM(s) Oral daily      Vital Signs Last 24 Hrs  T(C): 36.8 (02 Feb 2024 11:42), Max: 37 (01 Feb 2024 21:07)  T(F): 98.2 (02 Feb 2024 11:42), Max: 98.6 (01 Feb 2024 21:07)  HR: 81 (02 Feb 2024 11:42) (73 - 81)  BP: 92/53 (02 Feb 2024 11:42) (85/54 - 96/65)  BP(mean): --  RR: 18 (02 Feb 2024 11:42) (18 - 18)  SpO2: 98% (02 Feb 2024 05:29) (98% - 99%)    Parameters below as of 02 Feb 2024 11:42  Patient On (Oxygen Delivery Method): nasal cannula        Physical Exam:  General:    cachectic  Respiratory:   comfortable on RA  abd:   soft, PEG with brownish drainage around it, not tender  :   no suprapubic tenderness, + avila  Musculoskeletal : contracted, no joint swelling, no edema  Skin: multiple wounds including sacral with necrotic edge, back with linear eschar  vascular: no phlebitis                          8.7    15.08 )-----------( 151      ( 02 Feb 2024 11:39 )             30.6       02-02    143  |  111<H>  |  26<H>  ----------------------------<  171<H>  3.9   |  20<L>  |  0.38<L>    Ca    7.2<L>      02 Feb 2024 11:39  Phos  1.4     02-02  Mg     1.3     02-02    TPro  2.4<L>  /  Alb  1.1<L>  /  TBili  0.2  /  DBili  x   /  AST  12  /  ALT  14  /  AlkPhos  42  02-02      Urinalysis Basic - ( 02 Feb 2024 11:39 )    Color: x / Appearance: x / SG: x / pH: x  Gluc: 171 mg/dL / Ketone: x  / Bili: x / Urobili: x   Blood: x / Protein: x / Nitrite: x   Leuk Esterase: x / RBC: x / WBC x   Sq Epi: x / Non Sq Epi: x / Bacteria: x        MICROBIOLOGY:  v  .Blood Blood-Peripheral  02-01-24   No growth at 24 hours  --  --      Clean Catch Clean Catch (Midstream)  01-30-24   <10,000 CFU/mL Normal Urogenital Mireya  --  --      .Blood Blood-Peripheral  01-30-24   Growth in aerobic and anaerobic bottles: Staphylococcus aureus  Direct identification is available within approximately 3-5  hours either by Blood Panel Multiplexed PCR or Direct  MALDI-TOF. Details: [email or web address]  --  Blood Culture PCR  Staphylococcus aureus      .Blood Blood-Peripheral  01-30-24   No growth at 48 Hours  --  --      Catheterized Catheterized  01-18-24   <10,000 CFU/mL Normal Urogenital Mireya  --  --                RADIOLOGY:  Images independently visualized and reviewed personally, findings as below  < from: US Abdomen Complete (US Abdomen Complete .) (02.01.24 @ 10:13) >  IMPRESSION:    Hepatic steatosis. No focal hepatic lesion.  Cholelithiasis without evidence of cholecystitis.  1.5 cm echogenic lesion in the posterior right renal cortex may be   minimally increased in size compared to prior ultrasound performed   4/21/2021, and corresponds to enhancing lesion noted on CT. Findings   suggest angiomyolipoma. Correlation with MRI can be performed for further   evaluation.    < end of copied text >  < from: MR Lumbar Spine w/ IV Cont (02.01.24 @ 08:56) >  IMPRESSION:  Prominent sacral decubitus ulcer extending to the coccygeal tip without   evidence of underlying osteomyelitis or discrete fluid collections in the   surrounding soft tissues.    Sinus tract is seen extending from the skin surface to the L3 spinous   process which may represent additional ulcer. Recommend correlation with   clinical aspects of the case. No evidence of osteomyelitis in the   underlying L3 spinous process.    Multilevel chronic compression deformities and lumbar spondylosis as   described.      < end of copied text >

## 2024-02-02 NOTE — CHART NOTE - NSCHARTNOTEFT_GEN_A_CORE
:JOSE RAUL Lamas NP     INDICATION: Shock     PROCEDURE:  [ x ] LIMITED ECHO  [ x ] LIMITED CHEST  [ ] LIMITED RETROPERITONEAL  [ ] LIMITED ABDOMINAL  [ ] LIMITED DVT  [ ] NEEDLE GUIDANCE VASCULAR  [ ] NEEDLE GUIDANCE THORACENTESIS  [ ] NEEDLE GUIDANCE PARACENTESIS  [ ] NEEDLE GUIDANCE PERICARDIOCENTESIS  [ ] OTHER    FINDINGS:  Chest: Anterior a line predominant bilaterally, focal b lines posteriorly bilateral posterior simple effusions noted with consolidated surrounding lung   Echo: Difficult cardiac windows- Grossly normal LV systolic function, RV < LV, No pericardial effusions noted IVC noted to be 0.6 cm with > 50% respiratory variability     INTERPRETATION:  Chest: A line predominant bilateral moderate pleural effusions noted with associated consolidated lung pt with history of left breast cancer s/p mastectomy   Cardiac: Difficult windows- grossly normal LV systolic function IVC 0.6cm - Albumin ordered for fluid resuscitation

## 2024-02-02 NOTE — CHART NOTE - NSCHARTNOTEFT_GEN_A_CORE
RN reported BP 85/50. Pt seen at bedside. Poor historian. Alert. NAD. Nonverbal at baseline.   Vital Signs Last 24 Hrs  T(C): 36.6 (02 Feb 2024 05:29), Max: 37 (01 Feb 2024 21:07)  T(F): 97.9 (02 Feb 2024 05:29), Max: 98.6 (01 Feb 2024 21:07)  HR: 73 (02 Feb 2024 05:29) (73 - 77)  BP: 85/54 (02 Feb 2024 08:03) (85/54 - 96/65)  BP(mean): --  RR: 18 (02 Feb 2024 05:29) (18 - 18)  SpO2: 98% (02 Feb 2024 05:29) (95% - 99%)    Parameters below as of 02 Feb 2024 05:29  Patient On (Oxygen Delivery Method): nasal cannula    PHYSICAL EXAM:  EYES: EOMI, PERRLA, conjunctiva and sclera clear  NECK: Supple, No JVD  CHEST/LUNG: Clear to auscultation bilaterally; No wheeze  HEART: Regular rate and rhythm; No murmurs, rubs, or gallops  ABDOMEN: Soft, Nontender, Nondistended; Bowel sounds present  EXTREMITIES:  Contractures  PSYCH: Alert and non-verbal.  SKIN: Multiple wounds.    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 Admitted for sepsis likely 2/2 to wound infection, complicated by bacteremia. Now with soft blood pressures.    NS bolus 500cc  continue IV 0.5 NS at 125cc/hr  C/W Midodrine 10mg Q8H and monitor BP  D/W Dr Phillips - Pt's current likely pt's baseline BP,  recommend MICU consult if Pt's MAP goes below 60.

## 2024-02-02 NOTE — PROGRESS NOTE ADULT - SUBJECTIVE AND OBJECTIVE BOX
PROGRESS NOTE:   Authored by Dr. Ciera Phillips MD, Available on MS Teams    Patient is a 68y old  Female who presents with a chief complaint of Sepsis 2/2 sacral wounds (01 Feb 2024 16:16)      SUBJECTIVE / OVERNIGHT EVENTS: Unable to obtain hx due to mental status    ADDITIONAL REVIEW OF SYSTEMS:    MEDICATIONS  (STANDING):  ascorbic acid 500 milliGRAM(s) Oral daily  ceFAZolin   IVPB 2000 milliGRAM(s) IV Intermittent every 8 hours  ceFAZolin   IVPB      chlorhexidine 2% Cloths 1 Application(s) Topical <User Schedule>  collagenase Ointment 1 Application(s) Topical two times a day  Dakins Solution - 1/4 Strength 1 Application(s) Topical every 12 hours  dextrose 5%. 1000 milliLiter(s) (50 mL/Hr) IV Continuous <Continuous>  dextrose 5%. 1000 milliLiter(s) (100 mL/Hr) IV Continuous <Continuous>  dextrose 50% Injectable 12.5 Gram(s) IV Push once  dextrose 50% Injectable 25 Gram(s) IV Push once  dextrose 50% Injectable 25 Gram(s) IV Push once  folic acid 1 milliGRAM(s) Oral daily  glucagon  Injectable 1 milliGRAM(s) IntraMuscular once  hydrocortisone sodium succinate Injectable 50 milliGRAM(s) IV Push every 12 hours  insulin lispro (ADMELOG) corrective regimen sliding scale   SubCutaneous every 6 hours  levothyroxine Injectable 80 MICROGram(s) IV Push at bedtime  midodrine 10 milliGRAM(s) Oral every 8 hours  multivitamin 1 Tablet(s) Oral daily  polyethylene glycol 3350 17 Gram(s) Oral daily  senna 2 Tablet(s) Oral at bedtime  sertraline 25 milliGRAM(s) Oral daily  sodium chloride 0.9%. 1000 milliLiter(s) (100 mL/Hr) IV Continuous <Continuous>  tamoxifen 20 milliGRAM(s) Oral daily  thiamine 100 milliGRAM(s) Oral daily    MEDICATIONS  (PRN):  acetaminophen     Tablet .. 650 milliGRAM(s) Oral every 6 hours PRN Temp greater or equal to 38C (100.4F), Mild Pain (1 - 3)  dextrose Oral Gel 15 Gram(s) Oral once PRN Blood Glucose LESS THAN 70 milliGRAM(s)/deciliter  melatonin 3 milliGRAM(s) Oral at bedtime PRN Insomnia      CAPILLARY BLOOD GLUCOSE      POCT Blood Glucose.: 203 mg/dL (02 Feb 2024 12:09)  POCT Blood Glucose.: 155 mg/dL (02 Feb 2024 06:31)  POCT Blood Glucose.: 198 mg/dL (01 Feb 2024 23:52)  POCT Blood Glucose.: 224 mg/dL (01 Feb 2024 19:26)    I&O's Summary    01 Feb 2024 07:01  -  02 Feb 2024 07:00  --------------------------------------------------------  IN: 1500 mL / OUT: 500 mL / NET: 1000 mL    02 Feb 2024 07:01  -  02 Feb 2024 15:17  --------------------------------------------------------  IN: 0 mL / OUT: 300 mL / NET: -300 mL        PHYSICAL EXAM:  Vital Signs Last 24 Hrs  T(C): 36.8 (02 Feb 2024 11:42), Max: 37 (01 Feb 2024 21:07)  T(F): 98.2 (02 Feb 2024 11:42), Max: 98.6 (01 Feb 2024 21:07)  HR: 81 (02 Feb 2024 11:42) (73 - 81)  BP: 92/53 (02 Feb 2024 11:42) (85/54 - 96/65)  BP(mean): --  RR: 18 (02 Feb 2024 11:42) (18 - 18)  SpO2: 98% (02 Feb 2024 05:29) (98% - 99%)    Parameters below as of 02 Feb 2024 11:42  Patient On (Oxygen Delivery Method): nasal cannula        CONSTITUTIONAL: ill appearing  RESPIRATORY: Normal respiratory effort; lungs are clear to auscultation bilaterally  CARDIOVASCULAR: Regular rate and rhythm, normal S1 and S2, no murmur/rub/gallop; trace b/l LE edema  ABDOMEN: Nontender to palpation, normoactive bowel sounds, no rebound/guarding  MUSCLOSKELETAL: no clubbing or cyanosis of digits; extremities contract  SKIN: multiple sacral wounds  PSYCH: A+Ox0    LABS:                        8.7    15.08 )-----------( 151      ( 02 Feb 2024 11:39 )             30.6     02-02    143  |  111<H>  |  26<H>  ----------------------------<  171<H>  3.9   |  20<L>  |  0.38<L>    Ca    7.2<L>      02 Feb 2024 11:39  Phos  1.4     02-02  Mg     1.3     02-02    TPro  2.4<L>  /  Alb  1.1<L>  /  TBili  0.2  /  DBili  x   /  AST  12  /  ALT  14  /  AlkPhos  42  02-02          Urinalysis Basic - ( 02 Feb 2024 11:39 )    Color: x / Appearance: x / SG: x / pH: x  Gluc: 171 mg/dL / Ketone: x  / Bili: x / Urobili: x   Blood: x / Protein: x / Nitrite: x   Leuk Esterase: x / RBC: x / WBC x   Sq Epi: x / Non Sq Epi: x / Bacteria: x        Culture - Blood (collected 01 Feb 2024 01:10)  Source: .Blood Blood-Peripheral  Preliminary Report (02 Feb 2024 04:02):    No growth at 24 hours    Culture - Urine (collected 30 Jan 2024 17:15)  Source: Clean Catch Clean Catch (Midstream)  Final Report (01 Feb 2024 00:16):    <10,000 CFU/mL Normal Urogenital Mireya    Culture - Blood (collected 30 Jan 2024 16:30)  Source: .Blood Blood-Peripheral  Gram Stain (31 Jan 2024 17:16):    Growth in aerobic bottle: Gram Positive Cocci in Clusters    Growth in anaerobic bottle: Gram Positive Cocci in Clusters  Final Report (02 Feb 2024 11:07):    Growth in aerobic and anaerobic bottles: Staphylococcus aureus    Direct identification is available within approximately 3-5    hours either by Blood Panel Multiplexed PCR or Direct    MALDI-TOF. Details: [email or web address]  Organism: Blood Culture PCR  Staphylococcus aureus (02 Feb 2024 11:07)  Organism: Staphylococcus aureus (02 Feb 2024 11:07)  Organism: Blood Culture PCR (02 Feb 2024 11:07)    Culture - Blood (collected 30 Jan 2024 16:20)  Source: .Blood Blood-Peripheral  Preliminary Report (01 Feb 2024 22:02):    No growth at 48 Hours

## 2024-02-02 NOTE — CHART NOTE - NSCHARTNOTEFT_GEN_A_CORE
RRT called for Hypotension of 76/45 and hypoxic requiring increased O2 to 4L NC  Pt wsa given IVF bolus and started Levophed  Transferred to MICU  Spouse wants pt to be full code  Spouse updated on the need for RRT and ICU transfer  Dr. Phillips made aware    [id number]

## 2024-02-03 LAB
ALBUMIN SERPL ELPH-MCNC: 3.1 G/DL — LOW (ref 3.3–5)
ALP SERPL-CCNC: 68 U/L — SIGNIFICANT CHANGE UP (ref 40–120)
ALT FLD-CCNC: 22 U/L — SIGNIFICANT CHANGE UP (ref 10–45)
ANION GAP SERPL CALC-SCNC: 11 MMOL/L — SIGNIFICANT CHANGE UP (ref 5–17)
APTT BLD: 23 SEC — LOW (ref 24.5–35.6)
AST SERPL-CCNC: 20 U/L — SIGNIFICANT CHANGE UP (ref 10–40)
BASE EXCESS BLDV CALC-SCNC: -0.9 MMOL/L — SIGNIFICANT CHANGE UP (ref -2–3)
BASE EXCESS BLDV CALC-SCNC: 2 MMOL/L — SIGNIFICANT CHANGE UP (ref -2–3)
BASOPHILS # BLD AUTO: 0.02 K/UL — SIGNIFICANT CHANGE UP (ref 0–0.2)
BASOPHILS NFR BLD AUTO: 0.2 % — SIGNIFICANT CHANGE UP (ref 0–2)
BILIRUB SERPL-MCNC: 0.3 MG/DL — SIGNIFICANT CHANGE UP (ref 0.2–1.2)
BUN SERPL-MCNC: 16 MG/DL — SIGNIFICANT CHANGE UP (ref 7–23)
CA-I SERPL-SCNC: 1.11 MMOL/L — LOW (ref 1.15–1.33)
CA-I SERPL-SCNC: 1.14 MMOL/L — LOW (ref 1.15–1.33)
CALCIUM SERPL-MCNC: 7.5 MG/DL — LOW (ref 8.4–10.5)
CHLORIDE BLDV-SCNC: 108 MMOL/L — SIGNIFICANT CHANGE UP (ref 96–108)
CHLORIDE BLDV-SCNC: 114 MMOL/L — HIGH (ref 96–108)
CHLORIDE SERPL-SCNC: 114 MMOL/L — HIGH (ref 96–108)
CO2 BLDV-SCNC: 24 MMOL/L — SIGNIFICANT CHANGE UP (ref 22–26)
CO2 BLDV-SCNC: 26 MMOL/L — SIGNIFICANT CHANGE UP (ref 22–26)
CO2 SERPL-SCNC: 22 MMOL/L — SIGNIFICANT CHANGE UP (ref 22–31)
CREAT SERPL-MCNC: <0.3 MG/DL — LOW (ref 0.5–1.3)
EGFR: 114 ML/MIN/1.73M2 — SIGNIFICANT CHANGE UP
EOSINOPHIL # BLD AUTO: 0 K/UL — SIGNIFICANT CHANGE UP (ref 0–0.5)
EOSINOPHIL NFR BLD AUTO: 0 % — SIGNIFICANT CHANGE UP (ref 0–6)
GAS PNL BLDV: 140 MMOL/L — SIGNIFICANT CHANGE UP (ref 136–145)
GAS PNL BLDV: 143 MMOL/L — SIGNIFICANT CHANGE UP (ref 136–145)
GAS PNL BLDV: SIGNIFICANT CHANGE UP
GLUCOSE BLDC GLUCOMTR-MCNC: 133 MG/DL — HIGH (ref 70–99)
GLUCOSE BLDC GLUCOMTR-MCNC: 142 MG/DL — HIGH (ref 70–99)
GLUCOSE BLDC GLUCOMTR-MCNC: 147 MG/DL — HIGH (ref 70–99)
GLUCOSE BLDV-MCNC: 147 MG/DL — HIGH (ref 70–99)
GLUCOSE BLDV-MCNC: 202 MG/DL — HIGH (ref 70–99)
GLUCOSE SERPL-MCNC: 147 MG/DL — HIGH (ref 70–99)
HCO3 BLDV-SCNC: 23 MMOL/L — SIGNIFICANT CHANGE UP (ref 22–29)
HCO3 BLDV-SCNC: 25 MMOL/L — SIGNIFICANT CHANGE UP (ref 22–29)
HCT VFR BLD CALC: 27.1 % — LOW (ref 34.5–45)
HCT VFR BLDA CALC: 24 % — LOW (ref 34.5–46.5)
HCT VFR BLDA CALC: 26 % — LOW (ref 34.5–46.5)
HGB BLD CALC-MCNC: 7.9 G/DL — LOW (ref 11.7–16.1)
HGB BLD CALC-MCNC: 8.8 G/DL — LOW (ref 11.7–16.1)
HGB BLD-MCNC: 8 G/DL — LOW (ref 11.5–15.5)
HOROWITZ INDEX BLDV+IHG-RTO: 28 — SIGNIFICANT CHANGE UP
HOROWITZ INDEX BLDV+IHG-RTO: 28 — SIGNIFICANT CHANGE UP
IMM GRANULOCYTES NFR BLD AUTO: 1.2 % — HIGH (ref 0–0.9)
INR BLD: 1.16 RATIO — SIGNIFICANT CHANGE UP (ref 0.85–1.18)
LACTATE BLDV-MCNC: 2.6 MMOL/L — HIGH (ref 0.5–2)
LACTATE BLDV-MCNC: 3.2 MMOL/L — HIGH (ref 0.5–2)
LYMPHOCYTES # BLD AUTO: 0.44 K/UL — LOW (ref 1–3.3)
LYMPHOCYTES # BLD AUTO: 3.6 % — LOW (ref 13–44)
MAGNESIUM SERPL-MCNC: 2.1 MG/DL — SIGNIFICANT CHANGE UP (ref 1.6–2.6)
MCHC RBC-ENTMCNC: 26.9 PG — LOW (ref 27–34)
MCHC RBC-ENTMCNC: 29.5 GM/DL — LOW (ref 32–36)
MCV RBC AUTO: 91.2 FL — SIGNIFICANT CHANGE UP (ref 80–100)
MONOCYTES # BLD AUTO: 0.37 K/UL — SIGNIFICANT CHANGE UP (ref 0–0.9)
MONOCYTES NFR BLD AUTO: 3.1 % — SIGNIFICANT CHANGE UP (ref 2–14)
NEUTROPHILS # BLD AUTO: 11.14 K/UL — HIGH (ref 1.8–7.4)
NEUTROPHILS NFR BLD AUTO: 91.9 % — HIGH (ref 43–77)
NRBC # BLD: 0 /100 WBCS — SIGNIFICANT CHANGE UP (ref 0–0)
PCO2 BLDV: 32 MMHG — LOW (ref 39–42)
PCO2 BLDV: 35 MMHG — LOW (ref 39–42)
PH BLDV: 7.43 — SIGNIFICANT CHANGE UP (ref 7.32–7.43)
PH BLDV: 7.5 — HIGH (ref 7.32–7.43)
PHOSPHATE SERPL-MCNC: 2.1 MG/DL — LOW (ref 2.5–4.5)
PLATELET # BLD AUTO: 158 K/UL — SIGNIFICANT CHANGE UP (ref 150–400)
PO2 BLDV: 61 MMHG — HIGH (ref 25–45)
PO2 BLDV: 62 MMHG — HIGH (ref 25–45)
POTASSIUM BLDV-SCNC: 3.4 MMOL/L — LOW (ref 3.5–5.1)
POTASSIUM BLDV-SCNC: 4 MMOL/L — SIGNIFICANT CHANGE UP (ref 3.5–5.1)
POTASSIUM SERPL-MCNC: 4 MMOL/L — SIGNIFICANT CHANGE UP (ref 3.5–5.3)
POTASSIUM SERPL-SCNC: 4 MMOL/L — SIGNIFICANT CHANGE UP (ref 3.5–5.3)
PROT SERPL-MCNC: 5.2 G/DL — LOW (ref 6–8.3)
PROTHROM AB SERPL-ACNC: 12.1 SEC — SIGNIFICANT CHANGE UP (ref 9.5–13)
RBC # BLD: 2.97 M/UL — LOW (ref 3.8–5.2)
RBC # FLD: 16.9 % — HIGH (ref 10.3–14.5)
SAO2 % BLDV: 91.9 % — HIGH (ref 67–88)
SAO2 % BLDV: 92.2 % — HIGH (ref 67–88)
SODIUM SERPL-SCNC: 147 MMOL/L — HIGH (ref 135–145)
TSH SERPL-MCNC: 1.97 UIU/ML — SIGNIFICANT CHANGE UP (ref 0.27–4.2)
WBC # BLD: 12.12 K/UL — HIGH (ref 3.8–10.5)
WBC # FLD AUTO: 12.12 K/UL — HIGH (ref 3.8–10.5)

## 2024-02-03 PROCEDURE — 99233 SBSQ HOSP IP/OBS HIGH 50: CPT | Mod: GC

## 2024-02-03 PROCEDURE — 93308 TTE F-UP OR LMTD: CPT | Mod: 26,GC

## 2024-02-03 PROCEDURE — 93308 TTE F-UP OR LMTD: CPT | Mod: 26

## 2024-02-03 PROCEDURE — 93321 DOPPLER ECHO F-UP/LMTD STD: CPT | Mod: 26

## 2024-02-03 PROCEDURE — 76604 US EXAM CHEST: CPT | Mod: 26,GC

## 2024-02-03 PROCEDURE — 99291 CRITICAL CARE FIRST HOUR: CPT | Mod: 25

## 2024-02-03 RX ORDER — SODIUM CHLORIDE 9 MG/ML
500 INJECTION, SOLUTION INTRAVENOUS ONCE
Refills: 0 | Status: COMPLETED | OUTPATIENT
Start: 2024-02-03 | End: 2024-02-03

## 2024-02-03 RX ORDER — ACETAMINOPHEN 500 MG
550 TABLET ORAL ONCE
Refills: 0 | Status: COMPLETED | OUTPATIENT
Start: 2024-02-03 | End: 2024-02-03

## 2024-02-03 RX ORDER — DROXIDOPA 100 MG/1
200 CAPSULE ORAL EVERY 8 HOURS
Refills: 0 | Status: DISCONTINUED | OUTPATIENT
Start: 2024-02-03 | End: 2024-02-04

## 2024-02-03 RX ORDER — POTASSIUM PHOSPHATE, MONOBASIC POTASSIUM PHOSPHATE, DIBASIC 236; 224 MG/ML; MG/ML
15 INJECTION, SOLUTION INTRAVENOUS ONCE
Refills: 0 | Status: COMPLETED | OUTPATIENT
Start: 2024-02-03 | End: 2024-02-03

## 2024-02-03 RX ADMIN — DROXIDOPA 200 MILLIGRAM(S): 100 CAPSULE ORAL at 13:48

## 2024-02-03 RX ADMIN — Medication 2: at 23:59

## 2024-02-03 RX ADMIN — TAMOXIFEN CITRATE 20 MILLIGRAM(S): 20 TABLET, FILM COATED ORAL at 13:45

## 2024-02-03 RX ADMIN — Medication 500 MILLIGRAM(S): at 11:45

## 2024-02-03 RX ADMIN — POTASSIUM PHOSPHATE, MONOBASIC POTASSIUM PHOSPHATE, DIBASIC 62.5 MILLIMOLE(S): 236; 224 INJECTION, SOLUTION INTRAVENOUS at 05:26

## 2024-02-03 RX ADMIN — Medication 1 APPLICATION(S): at 17:40

## 2024-02-03 RX ADMIN — SODIUM CHLORIDE 1000 MILLILITER(S): 9 INJECTION, SOLUTION INTRAVENOUS at 09:00

## 2024-02-03 RX ADMIN — Medication 1 MILLIGRAM(S): at 11:45

## 2024-02-03 RX ADMIN — MIDODRINE HYDROCHLORIDE 5 MILLIGRAM(S): 2.5 TABLET ORAL at 05:30

## 2024-02-03 RX ADMIN — Medication 1 DROP(S): at 05:31

## 2024-02-03 RX ADMIN — Medication 100 MILLIGRAM(S): at 13:45

## 2024-02-03 RX ADMIN — HEPARIN SODIUM 5000 UNIT(S): 5000 INJECTION INTRAVENOUS; SUBCUTANEOUS at 05:30

## 2024-02-03 RX ADMIN — Medication 80 MICROGRAM(S): at 21:09

## 2024-02-03 RX ADMIN — Medication 1 DROP(S): at 17:41

## 2024-02-03 RX ADMIN — SENNA PLUS 2 TABLET(S): 8.6 TABLET ORAL at 21:08

## 2024-02-03 RX ADMIN — Medication 50 MILLIGRAM(S): at 17:40

## 2024-02-03 RX ADMIN — Medication 550 MILLIGRAM(S): at 19:00

## 2024-02-03 RX ADMIN — HEPARIN SODIUM 5000 UNIT(S): 5000 INJECTION INTRAVENOUS; SUBCUTANEOUS at 17:41

## 2024-02-03 RX ADMIN — Medication 1 APPLICATION(S): at 05:30

## 2024-02-03 RX ADMIN — DROXIDOPA 200 MILLIGRAM(S): 100 CAPSULE ORAL at 21:08

## 2024-02-03 RX ADMIN — Medication 100 MILLIGRAM(S): at 05:26

## 2024-02-03 RX ADMIN — Medication 1 TABLET(S): at 11:46

## 2024-02-03 RX ADMIN — Medication 100 MILLIGRAM(S): at 21:08

## 2024-02-03 RX ADMIN — Medication 100 MILLIGRAM(S): at 11:45

## 2024-02-03 RX ADMIN — CHLORHEXIDINE GLUCONATE 1 APPLICATION(S): 213 SOLUTION TOPICAL at 05:31

## 2024-02-03 RX ADMIN — Medication 1 APPLICATION(S): at 17:39

## 2024-02-03 RX ADMIN — Medication 220 MILLIGRAM(S): at 18:24

## 2024-02-03 RX ADMIN — SERTRALINE 25 MILLIGRAM(S): 25 TABLET, FILM COATED ORAL at 11:45

## 2024-02-03 RX ADMIN — Medication 50 MILLIGRAM(S): at 05:30

## 2024-02-03 NOTE — PROGRESS NOTE ADULT - SUBJECTIVE AND OBJECTIVE BOX
Gaby Daniel PGY-1    Patient is a 68y old  Female who presents with a chief complaint of Sepsis 2/2 osteomyelitis (2024 15:17)    SUBJECTIVE / OVERNIGHT EVENTS:  - bradycardic 40s-50s overnight, sinus  - s/p albumin 5% 250 cc x2  - on levo, pressor requirements decreasing  - on 2L NC    MEDICATIONS  (STANDING):  artificial  tears Solution 1 Drop(s) Both EYES two times a day  ascorbic acid 500 milliGRAM(s) Oral daily  ceFAZolin   IVPB 2000 milliGRAM(s) IV Intermittent every 8 hours  ceFAZolin   IVPB      chlorhexidine 2% Cloths 1 Application(s) Topical <User Schedule>  collagenase Ointment 1 Application(s) Topical two times a day  Dakins Solution - 1/4 Strength 1 Application(s) Topical every 12 hours  dextrose 5%. 1000 milliLiter(s) (50 mL/Hr) IV Continuous <Continuous>  dextrose 5%. 1000 milliLiter(s) (100 mL/Hr) IV Continuous <Continuous>  dextrose 50% Injectable 12.5 Gram(s) IV Push once  dextrose 50% Injectable 25 Gram(s) IV Push once  dextrose 50% Injectable 25 Gram(s) IV Push once  droxidopa 200 milliGRAM(s) Oral every 8 hours  folic acid 1 milliGRAM(s) Oral daily  glucagon  Injectable 1 milliGRAM(s) IntraMuscular once  heparin   Injectable 5000 Unit(s) SubCutaneous every 12 hours  hydrocortisone sodium succinate Injectable 50 milliGRAM(s) IV Push every 12 hours  insulin lispro (ADMELOG) corrective regimen sliding scale   SubCutaneous every 6 hours  lactated ringers Bolus 500 milliLiter(s) IV Bolus once  levothyroxine Injectable 80 MICROGram(s) IV Push at bedtime  multivitamin 1 Tablet(s) Oral daily  norepinephrine Infusion 0.05 MICROgram(s)/kG/Min (3.4 mL/Hr) IV Continuous <Continuous>  polyethylene glycol 3350 17 Gram(s) Oral daily  senna 2 Tablet(s) Oral at bedtime  sertraline 25 milliGRAM(s) Oral daily  tamoxifen 20 milliGRAM(s) Oral daily  thiamine 100 milliGRAM(s) Oral daily    MEDICATIONS  (PRN):  dextrose Oral Gel 15 Gram(s) Oral once PRN Blood Glucose LESS THAN 70 milliGRAM(s)/deciliter    Allergies    Tapazole (Hives)    Intolerances        Vital Signs Last 24 Hrs  T(C): 37.1 (2024 03:30), Max: 37.1 (2024 03:30)  T(F): 98.8 (2024 03:30), Max: 98.8 (2024 03:30)  HR: 48 (2024 08:00) (46 - 143)  BP: 116/68 (2024 08:00) (75/48 - 152/63)  BP(mean): 88 (2024 08:00) (59 - 99)  RR: 19 (2024 08:00) (10 - 31)  SpO2: 96% (2024 08:00) (95% - 100%)    Parameters below as of 2024 03:30  Patient On (Oxygen Delivery Method): nasal cannula  O2 Flow (L/min): 2    Daily     Daily Weight in k.4 (2024 03:30)  I&O's Summary    2024 07:01  -  2024 07:00  --------------------------------------------------------  IN: 1894.3 mL / OUT: 2205 mL / NET: -310.7 mL        Physical Exam  General: A&Ox0, NAD, opens eyes and squeezes hands to command  HEENT: mucous membranes slightly dry  Neck: supple  Chest/Lungs: CTAB, no WRR  Heart: bradycardic, regular rhythm, no murmurs. No cyanosis  Abdomen: soft, nontender, nondistended   Extremities: contracted c/w baseline, no edema, palpable pulses  Skin: multiple pressure injuries to sacrum/right hip/buttocks, left shoulder/hip/spine, L foot - not examined today due to patient positioning  Neuro: awake, nonverbal (c/w baseline) but opens eyes and squeezes hands to command    DIAGNOSTICS:                         8.0    12.12 )-----------( 158      ( 2024 02:37 )             27.1     Hgb Trend: 8.0<--, 7.5<--, 8.7<--, 9.2<--, 9.2<--  02-03    147<H>  |  114<H>  |  16  ----------------------------<  147<H>  4.0   |  22  |  <0.30<L>    Ca    7.5<L>      2024 02:37  Phos  2.1     02-03  Mg     2.1         TPro  5.2<L>  /  Alb  3.1<L>  /  TBili  0.3  /  DBili  x   /  AST  20  /  ALT  22  /  AlkPhos  68  -    CAPILLARY BLOOD GLUCOSE      POCT Blood Glucose.: 142 mg/dL (2024 04:59)  POCT Blood Glucose.: 170 mg/dL (2024 23:19)  POCT Blood Glucose.: 133 mg/dL (2024 16:35)  POCT Blood Glucose.: 203 mg/dL (2024 12:09)    Creatinine Trend: <0.30<--, <0.30<--, 0.38<--, <0.30<--, 0.41<--, 0.39<--  LIVER FUNCTIONS - ( 2024 02:37 )  Alb: 3.1 g/dL / Pro: 5.2 g/dL / ALK PHOS: 68 U/L / ALT: 22 U/L / AST: 20 U/L / GGT: x           PT/INR - ( 2024 02:37 )   PT: 12.1 sec;   INR: 1.16 ratio         PTT - ( 2024 02:37 )  PTT:23.0 sec      Urinalysis Basic - ( 2024 02:37 )    Color: x / Appearance: x / SG: x / pH: x  Gluc: 147 mg/dL / Ketone: x  / Bili: x / Urobili: x   Blood: x / Protein: x / Nitrite: x   Leuk Esterase: x / RBC: x / WBC x   Sq Epi: x / Non Sq Epi: x / Bacteria: x

## 2024-02-03 NOTE — PROGRESS NOTE ADULT - ASSESSMENT
68 f with  TBI, SAH, non-verbal at baseline, CVA, sacral ulcer, graves, L breast ca s/p mastectomy on tamoxifen, aspiration s/p PEG, multiple decubiti and sacral osteo, sent from NH for  multiple days of less responsiveness and fever, here febrile to 103.4, tachycardic, hypotensive to 70s presents from [place] due to multiple days of reduced responsiveness and fever. Found to have leukocytosis, tachycardia and hypotension here as well. Difficult to assess, contracted and non-verbal.  has multiple wound with necrotic edge  WBC: 14, Na: 151, u/a with 17 WBC  CT: no PE, sacral decub to the level of sacrum and ?osteo    fever, tachycardia, hypotension, leukocytosis, transaminitis, hypernatremia septic shock, due to MSSA bacteremia, ? wound related   has multiple wound which have slough but no jim purulent drainage and the MRI did not show osteo or abscess, just a sinus tract extending from L3  transferred to MICU for hypotension, on pressors but requirements are improving  * c/w cefazolin 2 q 8  * follow the repeat blood cx  * echo  * follow with wound care   * monitor CBC/diff and CMP    The above assessment and plan was discussed with the primary team    Katherin Teixeira MD  contact on teams  After 5pm and on weekends call [phone]

## 2024-02-03 NOTE — CHART NOTE - NSCHARTNOTEFT_GEN_A_CORE
: Nikita    INDICATION: Shock    PROCEDURE:  [X ] LIMITED ECHO  [ X] LIMITED CHEST  [ ] LIMITED RETROPERITONEAL  [ ] LIMITED ABDOMINAL  [ ] LIMITED DVT  [ ] NEEDLE GUIDANCE VASCULAR  [ ] NEEDLE GUIDANCE THORACENTESIS  [ ] NEEDLE GUIDANCE PARACENTESIS  [ ] NEEDLE GUIDANCE PERICARDIOCENTESIS  [ ] OTHER    FINDINGS:  A line predominant anteriorly bilaterally. Small bilateral effusions L>R.  Poor cardiac windows. Grossly normal LVSF. IVC < 1cm and collapsible.     INTERPRETATION:  Bilateral effusions. IVC small and collapsible.   Images stored on [employer]. : Nikita    INDICATION: Shock    PROCEDURE:  [X ] LIMITED ECHO  [ X] LIMITED CHEST  [ ] LIMITED RETROPERITONEAL  [ ] LIMITED ABDOMINAL  [ ] LIMITED DVT  [ ] NEEDLE GUIDANCE VASCULAR  [ ] NEEDLE GUIDANCE THORACENTESIS  [ ] NEEDLE GUIDANCE PARACENTESIS  [ ] NEEDLE GUIDANCE PERICARDIOCENTESIS  [ ] OTHER    FINDINGS:  A line predominant anteriorly bilaterally. Small bilateral effusions L>R.  Poor cardiac windows. Grossly normal LVSF. IVC < 1cm and collapsible.     INTERPRETATION:  Bilateral effusions. IVC small and collapsible.   Images stored on [facility].    Attending Attestation:  Agree with above. I was present for the entire procedure and have reviewed all images.

## 2024-02-03 NOTE — PROGRESS NOTE ADULT - ATTENDING COMMENTS
Patient critically ill.    Patient is a 68F with extensive history including TBI, CVA, nonverbal, sacral decubitus and MSSA bacteremia, and L breast CA who has had progressive hypotension/shock requiring vasopressors. She was transferred to MICU in this setting    #Neuro - patient nonverbal at baseline. Will need to better understand baseline functional status. Not requiring sedative medications at this time  #Cardiovascular - shock state likely due to sepsis. Will start Droxidopa as was bradycardic on Midodrine which she was on at home  - IVC small - will give more IVF  - Was started on Hydrocortisone on admission - continue current dose  #Pulmonary - continue NC as needed to maintain O2 sat > 90%  - CTPA without PE. There is noted bronchiectasis  #Gastro - oropharyngeal dysphagia  - Tube feeds - PEG placed previously  #Nephro - monitor urine output  #Infectious Disease - MSSA bacteremia  continue Ancef  - ID and Wound care follow-up regarding sacral decub - imaging noted  - Repeat cultures from 2/1 thus far negative.  #Endo - monitor FS  - Synthroid for hypothyroidism  #Hem/Onc   - Breast Cancer on tamoxifen  #DVT proph - HSQ    Prognosis poor. Patient remains FULL CODE. Patient's  is a physician and is her surrogate decision maker. Prior Palliative Care notes reviewed. Patient critically ill.    Patient is a 68F with extensive history including TBI, CVA, nonverbal, sacral decubitus and MSSA bacteremia, and L breast CA who has had progressive hypotension/shock requiring vasopressors. She was transferred to MICU in this setting    #Neuro - patient nonverbal at baseline. Will need to better understand baseline functional status. Not requiring sedative medications at this time  #Cardiovascular - shock state likely due to sepsis. Will start Droxidopa as was bradycardic on Midodrine which she was on at home  - IVC small - will give more IVF  - Was started on Hydrocortisone on admission - continue current dose  - Echo pending  #Pulmonary - continue NC as needed to maintain O2 sat > 90%  - CTPA without PE. There is noted bronchiectasis  #Gastro - oropharyngeal dysphagia  - Tube feeds - PEG placed previously  #Nephro - monitor urine output  #Infectious Disease - MSSA bacteremia  continue Ancef  - ID and Wound care follow-up regarding sacral decub - imaging noted  - Repeat cultures from 2/1 thus far negative.  #Endo - monitor FS  - Synthroid for hypothyroidism  #Hem/Onc   - Breast Cancer on tamoxifen  #DVT proph - HSQ    Prognosis poor. Patient remains FULL CODE. Patient's  is a physician and is her surrogate decision maker. Prior Palliative Care notes reviewed. Patient critically ill.    Patient is a 68F with extensive history including TBI, CVA, nonverbal, sacral decubitus and MSSA bacteremia, and L breast CA who has had progressive hypotension/shock requiring vasopressors. She was transferred to MICU in this setting    #Neuro - patient nonverbal at baseline. Will need to better understand baseline functional status. Not requiring sedative medications at this time  #Cardiovascular - shock state likely due to sepsis. Will start Droxidopa as was bradycardic on Midodrine which she was on at home  - IVC small - will give more IVF  - Was started on Hydrocortisone on admission - continue current dose  - Echo pending  #Pulmonary - continue NC as needed to maintain O2 sat > 90%  - CTPA without PE. Bronchiectasis  #Gastro - oropharyngeal dysphagia  - Tube feeds - PEG placed previously  #Nephro - monitor urine output  #Infectious Disease - MSSA bacteremia  continue Ancef  - ID and Wound care follow-up regarding sacral decub - imaging noted  - Repeat cultures from 2/1 thus far negative.  #Endo - monitor FS  - Synthroid for hypothyroidism  #Hem/Onc   - Breast Cancer on tamoxifen  #DVT proph - HSQ    Prognosis poor. Patient remains FULL CODE. Patient's  is a physician and is her surrogate decision maker. Prior Palliative Care notes reviewed.

## 2024-02-03 NOTE — PROGRESS NOTE ADULT - ASSESSMENT
69 yo F with hx TBI (nonverbal, bedbound/contracted at baseline) s/p PEG last admission due to recurrent hypernatremia and chronic malnutrition, CVA, hypothyroidism, L breast cancer (s/p mastectomy tissue expander still in place ) who presented for decreased responsiveness and hypotension, found to have sepsis with MSSA bacteremia, likely source sacral ulcer w/ c/f possible osteomyelitis. Now being transferred to MICU for hypotension 2/2 likely septic shock, requiring pressors.     ======== Neuro =================  - AOx0, nonverbal c/w baseline mental status    ========Cardiovascular============  # shock, likely septic ISO sacral decub ulcer  - home med: midodrine 5 mg q8h  - d/c'd midodrine due to bradycardia, started droxidopa 200 mg q8h  - on levophed, pressure requirements decreasing  - sepsis workup/tx as outlined in ID plan    #QT prolongation  - QTc 561  - K>4, Mg>2  - avoid QT prolonging agents    ==========Respiratory===============  - basal consolidation seen on POCUS, likely 2/2 aspiration PNA vs atelectasis  - on empiric cefazolin  - on 2L NC, SpO2 appropriate  - CTA negative for PE  - wean O2 as tolerated      ===========GI/nutrition============  > s/p PEG  - diet NPO w/ TFs    > constipation, chronic  - cont miralax, senna    ===========RENAL/==============  - renal function at baseline, monitor for JAVIER ISO hemodynamic instability  - strict I/Os  # avila  - no avila at baseline  - TOV today    ========== SKIN =================  - extensive lumbar/sacral/gluteal unstageable pressure wounds   - MRI spine with prominent sacral decubitus ulcer extending to the coccygeal tip without evidence of underlying osteomyelitis or discrete fluid collections in the surrounding soft tissues  - blood cultures growing MSSA   - wound care following, appreciate recs  - continue cefazolin  - f/u wound cultures    =========== ID ==============  > sepsis  > MSSA bacteremia  > sacral wound, likely source of sepsis/bacteremia  - WBC 15k, hypotensive 2/2 likely septic shock  - ABG lactate 2.0  - continue cefazolin  - f/u repeat blood cultures, UA, wound cx  - repeat blood cx q48h until clear  - f/u TTE     ========== ENDOCRINE ===========  > hypothyroidism  - continue levothyroxine     ==========Heme/onc/DVT========  # hx breast cancer s/p mastectomy  - continue tamoxifen    # DVT ppx  - heparin SQ     #Ethics  - code status: FULL

## 2024-02-03 NOTE — CHART NOTE - NSCHARTNOTEFT_GEN_A_CORE
Nutrition Follow Up Note  Patient seen for: Nutrition Consult for "Assessment Education, Tube Feeding"/ New MICU Transfer/Malnutrition Follow up    Chart reviewed, events noted. "67 yo Female with hx TBI (nonverbal, bedbound/contracted at baseline) s/p PEG last admission due to recurrent hypernatremia and chronic malnutrition, CVA, hypothyroidism, L breast cancer (s/p mastectomy tissue expander still in place ) who presented for decreased responsiveness and hypotension, found to have sepsis with MSSA bacteremia, likely source sacral ulcer w/ c/f possible osteomyelitis. Now being transferred to MICU for hypotension 2/2 likely septic shock, requiring pressors."    Source: [] Patient       [x] EMR        [] RN        [x] Family at bedside       [x] Other: Medical team    -If unable to interview patient: [] Trach/Vent/BiPAP  [x] Disoriented/confused/inappropriate to interview    Nutrition-Related Events:   - status post RRT 23 for hypotension, transfer to MICU   - Pressor support with levophed at .05 MICROgram(s)/kG/Min (pressor requirement decreasing consistent with chart)    Diet Order:   Diet, NPO with Tube Feed:   Tube Feeding Modality: Gastrostomy  Glucerna 1.5 Vidal (GLUCERNA1.5RTH)  Total Volume for 24 Hours (mL): 900  Continuous  Starting Tube Feed Rate {mL per Hour}: 10  Increase Tube Feed Rate by (mL): 10     Every 4 hours  Until Goal Tube Feed Rate (mL per Hour): 50  Tube Feed Duration (in Hours): 18  Tube Feed Start Time: 07:30  Free Water Flush   Total Volume per Flush (mL): 60   Frequency: Every 4 Hours   Total Daily Volume of Flush (mL): 360  Carlos(7 Gm Arginine/7 Gm Glut/1.2 Gm HMB     Qty per Day:  2  Liquid Protein Supplement     Qty per Day:  1 (24)    EN Order + Modulars Provides: 900 mL total volume, 1630 kcal, 89 gm protein and 683 ml free water.  Current Pump Rate: 55 ml/hr (noted above goal rate)  - EN feeds changed to 18hr for MICU transfer    - RD spoke wit pt's spouse Silverio at bedside re: current EN regimen. Pt's spouse asking about vitamin supplementation; RD reviewed EN order and current vitamins with pt's spouse at bedside. No additional questions at this time.     Is current diet order appropriate/adequate? [] Yes  [x]  No: see recommendations below    Nutrition-related concerns:   - Septic shock; pressor support as above   - Bacteremic; on antibiotics regimen    - ISS admelog for glycemic control (HbA1c 5.8%); also ordered for steroid which can elevate BG   - Multivitamin, Vit C, thiamine, Folic acid ordered    - Phos 2.1<L> (2/3); repletion with potassium phosphate IV ordered   - Hypernatremia noted (2/3); free water ordered: 200mL q6 hours=800mL free water/day    GI:  Last BM 2/3 per flow sheets.   Bowel Regimen? [x] Yes- senna, miralax    Weights:   Daily Weight in k.4 ()  -> Daily weights noted. Mostly consistent with dosing wt at this time. Wt differences may be due to scale differences. Will monitor trends as available.    Drug Dosing Weight  Height (cm): 160 (2024 16:10)  Weight (kg): 36.3 (2024 16:10)  BMI (kg/m2): 14.2 (2024 16:10)  BSA (m2): 1.31 (2024 16:10)    MEDICATIONS  (STANDING):  artificial  tears Solution 1 Drop(s) Both EYES two times a day  ascorbic acid 500 milliGRAM(s) Oral daily  ceFAZolin   IVPB      ceFAZolin   IVPB 2000 milliGRAM(s) IV Intermittent every 8 hours  chlorhexidine 2% Cloths 1 Application(s) Topical <User Schedule>  collagenase Ointment 1 Application(s) Topical two times a day  Dakins Solution - 1/4 Strength 1 Application(s) Topical every 12 hours  dextrose 5%. 1000 milliLiter(s) (50 mL/Hr) IV Continuous <Continuous>  dextrose 5%. 1000 milliLiter(s) (100 mL/Hr) IV Continuous <Continuous>  dextrose 50% Injectable 12.5 Gram(s) IV Push once  dextrose 50% Injectable 25 Gram(s) IV Push once  dextrose 50% Injectable 25 Gram(s) IV Push once  droxidopa 200 milliGRAM(s) Oral every 8 hours  folic acid 1 milliGRAM(s) Oral daily  glucagon  Injectable 1 milliGRAM(s) IntraMuscular once  heparin   Injectable 5000 Unit(s) SubCutaneous every 12 hours  hydrocortisone sodium succinate Injectable 50 milliGRAM(s) IV Push every 12 hours  insulin lispro (ADMELOG) corrective regimen sliding scale   SubCutaneous every 6 hours  levothyroxine Injectable 80 MICROGram(s) IV Push at bedtime  multivitamin 1 Tablet(s) Oral daily  norepinephrine Infusion 0.05 MICROgram(s)/kG/Min (3.4 mL/Hr) IV Continuous <Continuous>  polyethylene glycol 3350 17 Gram(s) Oral daily  senna 2 Tablet(s) Oral at bedtime  sertraline 25 milliGRAM(s) Oral daily  tamoxifen 20 milliGRAM(s) Oral daily  thiamine 100 milliGRAM(s) Oral daily    Pertinent Labs:  @ 02:37: Na 147<H>, BUN 16, Cr <0.30<L>, <H>, K+ 4.0, Phos 2.1<L>, Mg 2.1, Alk Phos 68, ALT/SGPT 22, AST/SGOT 20, HbA1c --   @ 17:33: Na 142, BUN 20, Cr <0.30<L>, <H>, K+ 3.4<L>, Phos 1.8<L>, Mg 1.9, Alk Phos 56, ALT/SGPT 20, AST/SGOT 15, HbA1c --    A1C with Estimated Average Glucose Result: 5.8 % (24 @ 06:48)  A1C with Estimated Average Glucose Result: 5.7 % (23 @ 10:33)    Finger Sticks:  POCT Blood Glucose.: 133 mg/dL ( @ 11:42)  POCT Blood Glucose.: 142 mg/dL ( @ 04:59)  POCT Blood Glucose.: 170 mg/dL ( @ 23:19)  POCT Blood Glucose.: 133 mg/dL ( @ 16:35)    Triglycerides, Serum: 148 mg/dL (24 @ 06:48)    Skin per nursing documentation: Per  wound care note, pt with "Sacral/ Buttocks /rt hip stage 4 pressure injury, left shoulder/ hip/spine unstageable pressure injury, Lt Ear deep tissue injury, Incontinence Associated Dermatitis, Lt foot deep tissue injury"  Edema: 2+ dependent per flow sheets    Nutritional needs based on wt of 36.3 kG with consideration for underweight, severe malnutrition:  Estimated Energy Needs: (38-42 kcal/kg): 1909-9142 kcal  Estimated Protein Needs: (1.7-2.3 g/kg): 62-83 gm  Defer fluid needs to team    Previous Nutrition Diagnosis: Underweight, Severe, chronic malnutrition, Increased nutrient needs  Nutrition Diagnosis is: [x] ongoing- being addressed with EN regimen, micronutrient supplementation  [] resolved [] not applicable     New Nutrition Diagnosis: [x] Not applicable    Nutrition Care Plan:  [x] In Progress  [] Achieved  [] Not applicable    Nutrition Interventions:     Education Provided:       [] Yes:  [x] No: unable to educate pt secondary to cognitive status    Recommendations:      1. Continue with EN feeds of Glucerna 1.5 (as pressor requirements are decreasing) at NEW goal rate of 55 ml/hr x 18 hours to provide 990 ml total volume, 1485 kcal, 82 gm protein and 751 ml free water (+800mL free water for water thzykes=9178 mL total water daily). EN feeds at goal would meet 40 kcal/kg and 2.2 g/kg protein based on wt of 36.3 kG.  -> RD remains available to adjust EN regimen PRN.  -> Continue to trend and replete electrolytes daily (K, Phos, Mg)  -> If pressor requirements increase, consider switching EN formula to semi-elemental for GI tolerance  2. Recommend d/c Liquid Protein Supplement (100 kcal, 15 gm protein per packet) and Juvenx2 (pt transfer with septic shock); not appropriate at this time.   3. Continue with micronutrient supplementation of multivitamin, thiamine, Vit C, folic acid as medically feasible to aid in wound healing    Monitoring and Evaluation:   Continue to monitor nutritional intake, tolerance to diet prescription, weights, labs, skin integrity    RD remains available upon request and will follow up per protocol  Rina Antonio MS, RD, CDN, CNSC  avail. via MS TEAMS

## 2024-02-03 NOTE — PROGRESS NOTE ADULT - SUBJECTIVE AND OBJECTIVE BOX
Follow Up:  sepsis    Interval History/ROS: pt had an RRT for hypotension, was transferred to MICU and started on pressors, also had bradycardia overnight,     ROS:    Unobtainable because: non verbal          Allergies  Tapazole (Hives)        ANTIMICROBIALS:  ceFAZolin   IVPB    ceFAZolin   IVPB 2000 every 8 hours      OTHER MEDS:  artificial  tears Solution 1 Drop(s) Both EYES two times a day  ascorbic acid 500 milliGRAM(s) Oral daily  chlorhexidine 2% Cloths 1 Application(s) Topical <User Schedule>  collagenase Ointment 1 Application(s) Topical two times a day  Dakins Solution - 1/4 Strength 1 Application(s) Topical every 12 hours  dextrose 5%. 1000 milliLiter(s) IV Continuous <Continuous>  dextrose 5%. 1000 milliLiter(s) IV Continuous <Continuous>  dextrose 50% Injectable 12.5 Gram(s) IV Push once  dextrose 50% Injectable 25 Gram(s) IV Push once  dextrose 50% Injectable 25 Gram(s) IV Push once  dextrose Oral Gel 15 Gram(s) Oral once PRN  droxidopa 200 milliGRAM(s) Oral every 8 hours  folic acid 1 milliGRAM(s) Oral daily  glucagon  Injectable 1 milliGRAM(s) IntraMuscular once  heparin   Injectable 5000 Unit(s) SubCutaneous every 12 hours  hydrocortisone sodium succinate Injectable 50 milliGRAM(s) IV Push every 12 hours  insulin lispro (ADMELOG) corrective regimen sliding scale   SubCutaneous every 6 hours  lactated ringers Bolus 500 milliLiter(s) IV Bolus once  levothyroxine Injectable 80 MICROGram(s) IV Push at bedtime  multivitamin 1 Tablet(s) Oral daily  norepinephrine Infusion 0.05 MICROgram(s)/kG/Min IV Continuous <Continuous>  polyethylene glycol 3350 17 Gram(s) Oral daily  senna 2 Tablet(s) Oral at bedtime  sertraline 25 milliGRAM(s) Oral daily  tamoxifen 20 milliGRAM(s) Oral daily  thiamine 100 milliGRAM(s) Oral daily      Vital Signs Last 24 Hrs  T(C): 37.1 (03 Feb 2024 03:30), Max: 37.1 (03 Feb 2024 03:30)  T(F): 98.8 (03 Feb 2024 03:30), Max: 98.8 (03 Feb 2024 03:30)  HR: 51 (03 Feb 2024 10:00) (46 - 143)  BP: 119/65 (03 Feb 2024 10:00) (75/48 - 152/63)  BP(mean): 88 (03 Feb 2024 10:00) (59 - 99)  RR: 19 (03 Feb 2024 10:00) (10 - 31)  SpO2: 97% (03 Feb 2024 10:00) (95% - 100%)    Parameters below as of 03 Feb 2024 03:30  Patient On (Oxygen Delivery Method): nasal cannula  O2 Flow (L/min): 2      Physical Exam:  General:    cachectic  Respiratory:   comfortable on NC  abd:   soft, PEG with brownish drainage around it, not tender  :   no suprapubic tenderness, + avila  Musculoskeletal : contracted, no joint swelling, no edema  Skin: multiple wounds including sacral with necrotic edge, back with linear eschar  vascular: no phlebitis                          8.0    12.12 )-----------( 158      ( 03 Feb 2024 02:37 )             27.1       02-03    147<H>  |  114<H>  |  16  ----------------------------<  147<H>  4.0   |  22  |  <0.30<L>    Ca    7.5<L>      03 Feb 2024 02:37  Phos  2.1     02-03  Mg     2.1     02-03    TPro  5.2<L>  /  Alb  3.1<L>  /  TBili  0.3  /  DBili  x   /  AST  20  /  ALT  22  /  AlkPhos  68  02-03      Urinalysis Basic - ( 03 Feb 2024 02:37 )    Color: x / Appearance: x / SG: x / pH: x  Gluc: 147 mg/dL / Ketone: x  / Bili: x / Urobili: x   Blood: x / Protein: x / Nitrite: x   Leuk Esterase: x / RBC: x / WBC x   Sq Epi: x / Non Sq Epi: x / Bacteria: x        MICROBIOLOGY:  v  .Blood Blood-Peripheral  02-01-24   No growth at 48 Hours  --  --      Clean Catch Clean Catch (Midstream)  01-30-24   <10,000 CFU/mL Normal Urogenital Mireya  --  --      .Blood Blood-Peripheral  01-30-24   Growth in aerobic and anaerobic bottles: Staphylococcus aureus  Direct identification is available within approximately 3-5  hours either by Blood Panel Multiplexed PCR or Direct  MALDI-TOF. Details: [email or web address]  --  Blood Culture PCR  Staphylococcus aureus      .Blood Blood-Peripheral  01-30-24   No growth at 72 Hours  --  --      Catheterized Catheterized  01-18-24   <10,000 CFU/mL Normal Urogenital Mireya  --  --                RADIOLOGY:  Images independently visualized and reviewed personally, findings as below  < from: US Abdomen Complete (US Abdomen Complete .) (02.01.24 @ 10:13) >  IMPRESSION:    Hepatic steatosis. No focal hepatic lesion.  Cholelithiasis without evidence of cholecystitis.  1.5 cm echogenic lesion in the posterior right renal cortex may be   minimally increased in size compared to prior ultrasound performed   4/21/2021, and corresponds to enhancing lesion noted on CT. Findings   suggest angiomyolipoma. Correlation with MRI can be performed for further   evaluation.    < end of copied text >  < from: MR Lumbar Spine w/ IV Cont (02.01.24 @ 08:56) >  IMPRESSION:  Prominent sacral decubitus ulcer extending to the coccygeal tip without   evidence of underlying osteomyelitis or discrete fluid collections in the   surrounding soft tissues.    Sinus tract is seen extending from the skin surface to the L3 spinous   process which may represent additional ulcer. Recommend correlation with   clinical aspects of the case. No evidence of osteomyelitis in the   underlying L3 spinous process.    Multilevel chronic compression deformities and lumbar spondylosis as   described.    < end of copied text >  < from: CT Angio Chest PE Protocol w/ IV Cont (01.30.24 @ 18:21) >  IMPRESSION:  No pulmonary embolism in the main, left and right, segmental and proximal   subsegmental pulmonary arteries. Limited evaluation of the distal   pulmonary arteries due to patient motion.    Degenerative changes. Sacral decubitus ulcer to the level of the sacrum,   with questionable cortical changes of the bony sacrum may represent   osteomyelitis.    < end of copied text >

## 2024-02-04 LAB
ALBUMIN SERPL ELPH-MCNC: 2.9 G/DL — LOW (ref 3.3–5)
ALP SERPL-CCNC: 84 U/L — SIGNIFICANT CHANGE UP (ref 40–120)
ALT FLD-CCNC: 17 U/L — SIGNIFICANT CHANGE UP (ref 10–45)
ANION GAP SERPL CALC-SCNC: 13 MMOL/L — SIGNIFICANT CHANGE UP (ref 5–17)
APTT BLD: 24.2 SEC — LOW (ref 24.5–35.6)
AST SERPL-CCNC: 17 U/L — SIGNIFICANT CHANGE UP (ref 10–40)
BASOPHILS # BLD AUTO: 0.02 K/UL — SIGNIFICANT CHANGE UP (ref 0–0.2)
BASOPHILS NFR BLD AUTO: 0.1 % — SIGNIFICANT CHANGE UP (ref 0–2)
BILIRUB SERPL-MCNC: 0.3 MG/DL — SIGNIFICANT CHANGE UP (ref 0.2–1.2)
BUN SERPL-MCNC: 14 MG/DL — SIGNIFICANT CHANGE UP (ref 7–23)
CALCIUM SERPL-MCNC: 7.4 MG/DL — LOW (ref 8.4–10.5)
CHLORIDE SERPL-SCNC: 107 MMOL/L — SIGNIFICANT CHANGE UP (ref 96–108)
CO2 SERPL-SCNC: 24 MMOL/L — SIGNIFICANT CHANGE UP (ref 22–31)
CREAT SERPL-MCNC: <0.3 MG/DL — LOW (ref 0.5–1.3)
CULTURE RESULTS: SIGNIFICANT CHANGE UP
EGFR: 114 ML/MIN/1.73M2 — SIGNIFICANT CHANGE UP
EOSINOPHIL # BLD AUTO: 0 K/UL — SIGNIFICANT CHANGE UP (ref 0–0.5)
EOSINOPHIL NFR BLD AUTO: 0 % — SIGNIFICANT CHANGE UP (ref 0–6)
GLUCOSE BLDC GLUCOMTR-MCNC: 142 MG/DL — HIGH (ref 70–99)
GLUCOSE BLDC GLUCOMTR-MCNC: 143 MG/DL — HIGH (ref 70–99)
GLUCOSE BLDC GLUCOMTR-MCNC: 179 MG/DL — HIGH (ref 70–99)
GLUCOSE SERPL-MCNC: 224 MG/DL — HIGH (ref 70–99)
HCT VFR BLD CALC: 29 % — LOW (ref 34.5–45)
HGB BLD-MCNC: 8.8 G/DL — LOW (ref 11.5–15.5)
IMM GRANULOCYTES NFR BLD AUTO: 1.8 % — HIGH (ref 0–0.9)
INR BLD: 1.11 RATIO — SIGNIFICANT CHANGE UP (ref 0.85–1.18)
LYMPHOCYTES # BLD AUTO: 0.52 K/UL — LOW (ref 1–3.3)
LYMPHOCYTES # BLD AUTO: 3.6 % — LOW (ref 13–44)
MAGNESIUM SERPL-MCNC: 2 MG/DL — SIGNIFICANT CHANGE UP (ref 1.6–2.6)
MCHC RBC-ENTMCNC: 26.9 PG — LOW (ref 27–34)
MCHC RBC-ENTMCNC: 30.3 GM/DL — LOW (ref 32–36)
MCV RBC AUTO: 88.7 FL — SIGNIFICANT CHANGE UP (ref 80–100)
MONOCYTES # BLD AUTO: 0.4 K/UL — SIGNIFICANT CHANGE UP (ref 0–0.9)
MONOCYTES NFR BLD AUTO: 2.7 % — SIGNIFICANT CHANGE UP (ref 2–14)
MRSA PCR RESULT.: SIGNIFICANT CHANGE UP
NEUTROPHILS # BLD AUTO: 13.39 K/UL — HIGH (ref 1.8–7.4)
NEUTROPHILS NFR BLD AUTO: 91.8 % — HIGH (ref 43–77)
NRBC # BLD: 0 /100 WBCS — SIGNIFICANT CHANGE UP (ref 0–0)
PHOSPHATE SERPL-MCNC: 1.9 MG/DL — LOW (ref 2.5–4.5)
PLATELET # BLD AUTO: 199 K/UL — SIGNIFICANT CHANGE UP (ref 150–400)
POTASSIUM SERPL-MCNC: 3.5 MMOL/L — SIGNIFICANT CHANGE UP (ref 3.5–5.3)
POTASSIUM SERPL-SCNC: 3.5 MMOL/L — SIGNIFICANT CHANGE UP (ref 3.5–5.3)
PROT SERPL-MCNC: 5.1 G/DL — LOW (ref 6–8.3)
PROTHROM AB SERPL-ACNC: 12.2 SEC — SIGNIFICANT CHANGE UP (ref 9.5–13)
RBC # BLD: 3.27 M/UL — LOW (ref 3.8–5.2)
RBC # FLD: 16.5 % — HIGH (ref 10.3–14.5)
S AUREUS DNA NOSE QL NAA+PROBE: DETECTED
SODIUM SERPL-SCNC: 144 MMOL/L — SIGNIFICANT CHANGE UP (ref 135–145)
SPECIMEN SOURCE: SIGNIFICANT CHANGE UP
WBC # BLD: 14.59 K/UL — HIGH (ref 3.8–10.5)
WBC # FLD AUTO: 14.59 K/UL — HIGH (ref 3.8–10.5)

## 2024-02-04 PROCEDURE — 76604 US EXAM CHEST: CPT | Mod: 26

## 2024-02-04 PROCEDURE — 99291 CRITICAL CARE FIRST HOUR: CPT

## 2024-02-04 RX ORDER — POTASSIUM PHOSPHATE, MONOBASIC POTASSIUM PHOSPHATE, DIBASIC 236; 224 MG/ML; MG/ML
15 INJECTION, SOLUTION INTRAVENOUS ONCE
Refills: 0 | Status: COMPLETED | OUTPATIENT
Start: 2024-02-04 | End: 2024-02-04

## 2024-02-04 RX ORDER — MAGNESIUM SULFATE 500 MG/ML
1 VIAL (ML) INJECTION ONCE
Refills: 0 | Status: COMPLETED | OUTPATIENT
Start: 2024-02-04 | End: 2024-02-04

## 2024-02-04 RX ORDER — HYDROMORPHONE HYDROCHLORIDE 2 MG/ML
0.2 INJECTION INTRAMUSCULAR; INTRAVENOUS; SUBCUTANEOUS EVERY 8 HOURS
Refills: 0 | Status: DISCONTINUED | OUTPATIENT
Start: 2024-02-04 | End: 2024-02-07

## 2024-02-04 RX ORDER — POTASSIUM CHLORIDE 20 MEQ
40 PACKET (EA) ORAL ONCE
Refills: 0 | Status: COMPLETED | OUTPATIENT
Start: 2024-02-04 | End: 2024-02-04

## 2024-02-04 RX ORDER — DROXIDOPA 100 MG/1
300 CAPSULE ORAL EVERY 8 HOURS
Refills: 0 | Status: DISCONTINUED | OUTPATIENT
Start: 2024-02-04 | End: 2024-02-05

## 2024-02-04 RX ADMIN — Medication 3.4 MICROGRAM(S)/KG/MIN: at 21:14

## 2024-02-04 RX ADMIN — CHLORHEXIDINE GLUCONATE 1 APPLICATION(S): 213 SOLUTION TOPICAL at 05:10

## 2024-02-04 RX ADMIN — Medication 100 MILLIGRAM(S): at 05:10

## 2024-02-04 RX ADMIN — Medication 100 MILLIGRAM(S): at 14:38

## 2024-02-04 RX ADMIN — Medication 1 APPLICATION(S): at 17:45

## 2024-02-04 RX ADMIN — Medication 40 MILLIEQUIVALENT(S): at 00:29

## 2024-02-04 RX ADMIN — DROXIDOPA 300 MILLIGRAM(S): 100 CAPSULE ORAL at 12:25

## 2024-02-04 RX ADMIN — POLYETHYLENE GLYCOL 3350 17 GRAM(S): 17 POWDER, FOR SOLUTION ORAL at 12:25

## 2024-02-04 RX ADMIN — Medication 80 MICROGRAM(S): at 21:15

## 2024-02-04 RX ADMIN — Medication 1 APPLICATION(S): at 05:10

## 2024-02-04 RX ADMIN — Medication 100 GRAM(S): at 00:29

## 2024-02-04 RX ADMIN — DROXIDOPA 300 MILLIGRAM(S): 100 CAPSULE ORAL at 21:15

## 2024-02-04 RX ADMIN — POTASSIUM PHOSPHATE, MONOBASIC POTASSIUM PHOSPHATE, DIBASIC 62.5 MILLIMOLE(S): 236; 224 INJECTION, SOLUTION INTRAVENOUS at 00:28

## 2024-02-04 RX ADMIN — TAMOXIFEN CITRATE 20 MILLIGRAM(S): 20 TABLET, FILM COATED ORAL at 12:25

## 2024-02-04 RX ADMIN — SERTRALINE 25 MILLIGRAM(S): 25 TABLET, FILM COATED ORAL at 12:23

## 2024-02-04 RX ADMIN — DROXIDOPA 200 MILLIGRAM(S): 100 CAPSULE ORAL at 05:10

## 2024-02-04 RX ADMIN — Medication 1 MILLIGRAM(S): at 12:25

## 2024-02-04 RX ADMIN — Medication 1: at 13:06

## 2024-02-04 RX ADMIN — HEPARIN SODIUM 5000 UNIT(S): 5000 INJECTION INTRAVENOUS; SUBCUTANEOUS at 17:47

## 2024-02-04 RX ADMIN — Medication 50 MILLIGRAM(S): at 05:06

## 2024-02-04 RX ADMIN — HYDROMORPHONE HYDROCHLORIDE 0.2 MILLIGRAM(S): 2 INJECTION INTRAMUSCULAR; INTRAVENOUS; SUBCUTANEOUS at 04:55

## 2024-02-04 RX ADMIN — Medication 100 MILLIGRAM(S): at 12:23

## 2024-02-04 RX ADMIN — Medication 1 TABLET(S): at 12:23

## 2024-02-04 RX ADMIN — Medication 500 MILLIGRAM(S): at 12:23

## 2024-02-04 RX ADMIN — HYDROMORPHONE HYDROCHLORIDE 0.2 MILLIGRAM(S): 2 INJECTION INTRAMUSCULAR; INTRAVENOUS; SUBCUTANEOUS at 04:39

## 2024-02-04 RX ADMIN — Medication 1 DROP(S): at 17:39

## 2024-02-04 RX ADMIN — Medication 50 MILLIGRAM(S): at 17:46

## 2024-02-04 RX ADMIN — Medication 1 APPLICATION(S): at 05:11

## 2024-02-04 RX ADMIN — HEPARIN SODIUM 5000 UNIT(S): 5000 INJECTION INTRAVENOUS; SUBCUTANEOUS at 05:11

## 2024-02-04 RX ADMIN — Medication 1 DROP(S): at 05:06

## 2024-02-04 RX ADMIN — Medication 100 MILLIGRAM(S): at 21:15

## 2024-02-04 NOTE — PHYSICAL THERAPY INITIAL EVALUATION ADULT - PERTINENT HX OF CURRENT PROBLEM, REHAB EVAL
Pt is a 69 yo F admitted to [facility] on 1/30/24 with hx TBI (nonverbal, bedbound/contracted at baseline) s/p PEG last admission due to recurrent hypernatremia and chronic malnutrition, CVA, hypothyroidism, L breast cancer (s/p mastectomy tissue expander still in place ) who presented for decreased responsiveness and hypotension, found to have sepsis with MSSA bacteremia, likely source sacral ulcer w/ c/f possible osteomyelitis. Now being transferred to MICU for hypotension 2/2 likely septic shock, requiring pressors. AOx0, nonverbal c/w baseline mental status, +septic shock, likely septic ISO sacral decub ulcer, +levo for BP support, basal consolidation seen on POCUS, likely 2/2 aspiration PNA vs atelectasis,  CTA negative for PE, MRI spine with prominent sacral decubitus ulcer extending to the coccygeal tip without evidence of underlying osteomyelitis or discrete fluid collections in the surrounding soft tissues, blood cultures growing MSSA

## 2024-02-04 NOTE — PROGRESS NOTE ADULT - SUBJECTIVE AND OBJECTIVE BOX
INTERVAL HPI/OVERNIGHT EVENTS: Undergoing TOV - initially intermittent catheterize 550cc     SUBJECTIVE: Patient seen and examined at bedside.     ROS: All negative except as listed above.    VITAL SIGNS:  ICU Vital Signs Last 24 Hrs  T(C): 37.1 (04 Feb 2024 07:00), Max: 37.1 (04 Feb 2024 07:00)  T(F): 98.8 (04 Feb 2024 07:00), Max: 98.8 (04 Feb 2024 07:00)  HR: 74 (04 Feb 2024 10:30) (52 - 82)  BP: 98/62 (04 Feb 2024 10:30) (73/54 - 125/63)  BP(mean): 75 (04 Feb 2024 10:30) (59 - 88)  ABP: --  ABP(mean): --  RR: 20 (04 Feb 2024 10:30) (17 - 28)  SpO2: 97% (04 Feb 2024 10:30) (93% - 100%)    O2 Parameters below as of 03 Feb 2024 20:00  Patient On (Oxygen Delivery Method): nasal cannula  O2 Flow (L/min): 2          Plateau pressure:   P/F ratio:     02-03 @ 07:01  -  02-04 @ 07:00  --------------------------------------------------------  IN: 2777.6 mL / OUT: 2225 mL / NET: 552.6 mL    02-04 @ 07:01 - 02-04 @ 11:41  --------------------------------------------------------  IN: 136.6 mL / OUT: 0 mL / NET: 136.6 mL      CAPILLARY BLOOD GLUCOSE      POCT Blood Glucose.: 143 mg/dL (04 Feb 2024 04:56)      ECG: reviewed.    PHYSICAL EXAM:      Physical Exam  General: A&Ox0, NAD, more awake, eyes open  and squeezes hands to command  HEENT: mucous membranes slightly dry  Neck: supple  Chest/Lungs: CTAB, no WRR  Heart: bradycardic, regular rhythm, no murmurs. No cyanosis  Abdomen: soft, nontender, nondistended   Extremities: contracted c/w baseline, no edema, palpable pulses  Skin: multiple pressure injuries to sacrum/right hip/buttocks, left shoulder/hip/spine, L foot - not examined today due to patient positioning  Neuro: awake, nonverbal (c/w baseline) but opens eyes and squeezes hands to command        MEDICATIONS:  MEDICATIONS  (STANDING):  artificial  tears Solution 1 Drop(s) Both EYES two times a day  ascorbic acid 500 milliGRAM(s) Oral daily  ceFAZolin   IVPB      ceFAZolin   IVPB 2000 milliGRAM(s) IV Intermittent every 8 hours  chlorhexidine 2% Cloths 1 Application(s) Topical <User Schedule>  collagenase Ointment 1 Application(s) Topical two times a day  Dakins Solution - 1/4 Strength 1 Application(s) Topical every 12 hours  droxidopa 300 milliGRAM(s) Oral every 8 hours  folic acid 1 milliGRAM(s) Oral daily  heparin   Injectable 5000 Unit(s) SubCutaneous every 12 hours  hydrocortisone sodium succinate Injectable 50 milliGRAM(s) IV Push every 12 hours  insulin lispro (ADMELOG) corrective regimen sliding scale   SubCutaneous every 6 hours  levothyroxine Injectable 80 MICROGram(s) IV Push at bedtime  multivitamin 1 Tablet(s) Oral daily  norepinephrine Infusion 0.05 MICROgram(s)/kG/Min (3.4 mL/Hr) IV Continuous <Continuous>  polyethylene glycol 3350 17 Gram(s) Oral daily  senna 2 Tablet(s) Oral at bedtime  sertraline 25 milliGRAM(s) Oral daily  tamoxifen 20 milliGRAM(s) Oral daily  thiamine 100 milliGRAM(s) Oral daily    MEDICATIONS  (PRN):  HYDROmorphone  Injectable 0.2 milliGRAM(s) IV Push every 8 hours PRN dressing change      ALLERGIES:  Allergies    Tapazole (Hives)    Intolerances        LABS:                        8.8    14.59 )-----------( 199      ( 03 Feb 2024 23:47 )             29.0     02-03    144  |  107  |  14  ----------------------------<  224<H>  3.5   |  24  |  <0.30<L>    Ca    7.4<L>      03 Feb 2024 23:47  Phos  1.9     02-03  Mg     2.0     02-03    TPro  5.1<L>  /  Alb  2.9<L>  /  TBili  0.3  /  DBili  x   /  AST  17  /  ALT  17  /  AlkPhos  84  02-03    PT/INR - ( 03 Feb 2024 23:47 )   PT: 12.2 sec;   INR: 1.11 ratio         PTT - ( 03 Feb 2024 23:47 )  PTT:24.2 sec  Urinalysis Basic - ( 03 Feb 2024 23:47 )    Color: x / Appearance: x / SG: x / pH: x  Gluc: 224 mg/dL / Ketone: x  / Bili: x / Urobili: x   Blood: x / Protein: x / Nitrite: x   Leuk Esterase: x / RBC: x / WBC x   Sq Epi: x / Non Sq Epi: x / Bacteria: x      ABG:      vBG:  pH, Venous: 7.50 (02-03-24 @ 23:40)  pCO2, Venous: 32 mmHg (02-03-24 @ 23:40)  pO2, Venous: 62 mmHg (02-03-24 @ 23:40)  HCO3, Venous: 25 mmol/L (02-03-24 @ 23:40)    Micro:    Culture - Blood (collected 02-02-24 @ 18:47)  Source: .Blood Blood-Peripheral  Preliminary Report (02-03-24 @ 23:03):    No growth at 24 hours    Culture - Blood (collected 02-02-24 @ 18:30)  Source: .Blood Blood-Peripheral  Preliminary Report (02-03-24 @ 23:03):    No growth at 24 hours    Culture - Blood (collected 02-02-24 @ 09:23)  Source: .Blood Blood-Peripheral  Preliminary Report (02-03-24 @ 17:02):    No growth at 24 hours    Culture - Blood (collected 02-01-24 @ 01:10)  Source: .Blood Blood-Peripheral  Preliminary Report (02-04-24 @ 04:01):    No growth at 72 Hours    Culture - Blood (collected 01-30-24 @ 16:30)  Source: .Blood Blood-Peripheral  Gram Stain (01-31-24 @ 17:16):    Growth in aerobic bottle: Gram Positive Cocci in Clusters    Growth in anaerobic bottle: Gram Positive Cocci in Clusters  Final Report (02-02-24 @ 11:07):    Growth in aerobic and anaerobic bottles: Staphylococcus aureus    Direct identification is available within approximately 3-5    hours either by Blood Panel Multiplexed PCR or Direct    MALDI-TOF. Details: [email or web address]  Organism: Blood Culture PCR  Staphylococcus aureus (02-02-24 @ 11:07)  Organism: Staphylococcus aureus (02-02-24 @ 11:07)      Method Type: CHATA      -  Ampicillin/Sulbactam: S <=8/4      -  Cefazolin: S <=4      -  Clindamycin: S <=0.25      -  Erythromycin: R >4      -  Gentamicin: S <=1 Should not be used as monotherapy      -  Oxacillin: S <=0.25 Oxacillin predicts susceptibility for dicloxacillin, methicillin, and nafcillin      -  Rifampin: S <=1 Should not be used as monotherapy      -  Tetracycline: S <=1      -  Trimethoprim/Sulfamethoxazole: S <=0.5/9.5      -  Vancomycin: S 2  Organism: Blood Culture PCR (02-02-24 @ 11:07)      Method Type: PCR      -  Methicillin SENSITIVE Staphylococcus aureus (MSSA): Detec Any isolate of Staphylococcus aureus from a blood culture is NOT considered a contaminant.    Culture - Blood (collected 01-30-24 @ 16:20)  Source: .Blood Blood-Peripheral  Preliminary Report (02-03-24 @ 22:01):    No growth at 4 days          RADIOLOGY & ADDITIONAL TESTS: Reviewed.

## 2024-02-04 NOTE — PHYSICAL THERAPY INITIAL EVALUATION ADULT - GENERAL OBSERVATIONS, REHAB EVAL
" I need a lung biopsy done" Pt received supine in ICU, +ICU monitoring, nonverbal, offloaded to R side, per discussion with BREANNA Sexton, not appropriate for PT evaluation

## 2024-02-04 NOTE — PHYSICAL THERAPY INITIAL EVALUATION ADULT - PLANNED THERAPY INTERVENTIONS, PT EVAL
PT will DC pt at this time, not appropriate at this time, please reconsult if any changes, thank you.

## 2024-02-04 NOTE — PHYSICAL THERAPY INITIAL EVALUATION ADULT - ADDITIONAL COMMENTS
Per last admission in 12/2023: per  1 month ago pt was able to "walk to the car" and a few weeks ago was able to stand and turn to transfer to a wheelchair with assistance by .  states pt is never left alone, is either assisted by him or son. Pt requires assistance for all mobility (in wheelchair) and all ADLs. Pt has hospital bed and wheelchair.

## 2024-02-04 NOTE — PROGRESS NOTE ADULT - ATTENDING COMMENTS
Patient critically ill.    Patient is a 68F with extensive history including TBI, CVA, nonverbal, sacral decubitus and MSSA bacteremia, and L breast CA who has had progressive hypotension/shock requiring vasopressors. She was transferred to MICU in this setting    #Neuro - patient nonverbal at baseline. Not requiring sedative medications at this time  #Cardiovascular - shock state likely due to sepsis. Will increase Droxidopa. Previously on Midodrine but stopped due to bradycardia  - Was started on Hydrocortisone on admission - continue current dose  - Echo noted - difficult study  #Pulmonary - continue NC as needed to maintain O2 sat > 90%  - CTPA without PE. Bronchiectasis  #Gastro - oropharyngeal dysphagia  - Tube feeds - PEG placed previously  #Nephro - monitor urine output  #Infectious Disease - MSSA bacteremia  continue Ancef for now. WBC slightly increased today.  - ID and Wound care follow-up regarding sacral decub - imaging noted  - Repeat cultures from 2/1 thus far negative. Check nasal MRSA in case needs to be broadened to Vanco  #Endo - monitor FS  - Synthroid for hypothyroidism  #Hem/Onc   - Breast Cancer on tamoxifen  #DVT proph - HSQ    Prognosis poor. Patient remains FULL CODE. Patient's  is a physician and is her surrogate decision maker. Prior Palliative Care notes reviewed. Patient's niece is at bedside today visiting from [place] and was able to help provide some collateral

## 2024-02-04 NOTE — PROGRESS NOTE ADULT - ASSESSMENT
67 yo F with hx TBI (nonverbal, bedbound/contracted at baseline) s/p PEG last admission due to recurrent hypernatremia and chronic malnutrition, CVA, hypothyroidism, L breast cancer (s/p mastectomy tissue expander still in place ) who presented for decreased responsiveness and hypotension, found to have sepsis with MSSA bacteremia, likely source sacral ulcer w/ c/f possible osteomyelitis. Now being transferred to MICU for hypotension 2/2 likely septic shock, requiring pressors.     ======== Neuro =================  - AOx0, nonverbal c/w baseline mental status    ========Cardiovascular============  # shock, likely septic ISO sacral decub ulcer  - home med: midodrine 5 mg q8h  - d/c'd midodrine due to bradycardia, now increased droxidopa to 300mg q8h   - on levophed, pressure requirements decreasing- will wean as tolerated   - sepsis workup/tx as outlined in ID plan    #QT prolongation  - QTc 561  - K>4, Mg>2  - avoid QT prolonging agents    ==========Respiratory===============  - basal consolidation seen on POCUS, likely 2/2 aspiration PNA vs atelectasis  - on empiric cefazolin  - on 2L NC, SpO2 appropriate  - CTA negative for PE  - wean O2 as tolerated   - will obtain MRSA      ===========GI/nutrition============  > s/p PEG  - diet NPO w/ TFs    > constipation, chronic  - cont miralax, senna    ===========RENAL/==============  - renal function at baseline, monitor for JAVIER ISO hemodynamic instability  - strict I/Os  # avila  - no avila at baseline  - TOV today    ========== SKIN =================  - extensive lumbar/sacral/gluteal unstageable pressure wounds   - MRI spine with prominent sacral decubitus ulcer extending to the coccygeal tip without evidence of underlying osteomyelitis or discrete fluid collections in the surrounding soft tissues  - blood cultures growing MSSA   - wound care following, appreciate recs  - continue cefazolin  - f/u wound cultures    =========== ID ==============  > sepsis  > MSSA bacteremia  > sacral wound, likely source of sepsis/bacteremia  - WBC 15k, hypotensive 2/2 likely septic shock- intiailly has been downtrending to 11k now up to 14K on 2/4  - ABG lactate 2.0  - continue cefazolin  - f/u repeat 2/2 BCx negative,   - fu repeat MRSA, UA and wound culture   - repeat blood cx q48h until clear  - f/u TTE     ========== ENDOCRINE ===========  > hypothyroidism  - continue levothyroxine     ==========Heme/onc/DVT========  # hx breast cancer s/p mastectomy  - continue tamoxifen    # DVT ppx  - heparin SQ     #Ethics  - code status: FULL

## 2024-02-04 NOTE — PHYSICAL THERAPY INITIAL EVALUATION ADULT - NSPTDISCHREC_GEN_A_CORE
DC return to LTC/SNF upon DC; if pt to go home, recommend hospital bed (owns), patient lift device, WC (owns) assist for ALL functional mobility/ADLs, SW to be notified.

## 2024-02-04 NOTE — CHART NOTE - NSCHARTNOTEFT_GEN_A_CORE
: Leanne JIMENEZ NP     INDICATION: Shock     PROCEDURE:  [ ] LIMITED ECHO  [x ] LIMITED CHEST  [ ] LIMITED RETROPERITONEAL  [ ] LIMITED ABDOMINAL  [ ] LIMITED DVT  [ ] NEEDLE GUIDANCE VASCULAR  [ ] NEEDLE GUIDANCE THORACENTESIS  [ ] NEEDLE GUIDANCE PARACENTESIS  [ ] NEEDLE GUIDANCE PERICARDIOCENTESIS  [ ] OTHER    FINDINGS:  Chest   Unable to visualize right pleural base   (+) a line predominance to anterior chest wall   Left Pleural Base with (+) moderate amount of pleural effusion with associated consolidated lung   IVC 1.8 14% variability   INTERPRETATION:  Left pleural base effusion with associated consolidation IVC 1.8 14% variability

## 2024-02-05 LAB
ALBUMIN SERPL ELPH-MCNC: 2.5 G/DL — LOW (ref 3.3–5)
ALP SERPL-CCNC: 76 U/L — SIGNIFICANT CHANGE UP (ref 40–120)
ALT FLD-CCNC: 12 U/L — SIGNIFICANT CHANGE UP (ref 10–45)
ANION GAP SERPL CALC-SCNC: 11 MMOL/L — SIGNIFICANT CHANGE UP (ref 5–17)
APTT BLD: 23.8 SEC — LOW (ref 24.5–35.6)
AST SERPL-CCNC: 16 U/L — SIGNIFICANT CHANGE UP (ref 10–40)
BASOPHILS # BLD AUTO: 0.02 K/UL — SIGNIFICANT CHANGE UP (ref 0–0.2)
BASOPHILS NFR BLD AUTO: 0.1 % — SIGNIFICANT CHANGE UP (ref 0–2)
BILIRUB SERPL-MCNC: 0.3 MG/DL — SIGNIFICANT CHANGE UP (ref 0.2–1.2)
BUN SERPL-MCNC: 14 MG/DL — SIGNIFICANT CHANGE UP (ref 7–23)
CALCIUM SERPL-MCNC: 7.1 MG/DL — LOW (ref 8.4–10.5)
CHLORIDE SERPL-SCNC: 108 MMOL/L — SIGNIFICANT CHANGE UP (ref 96–108)
CO2 SERPL-SCNC: 23 MMOL/L — SIGNIFICANT CHANGE UP (ref 22–31)
CREAT SERPL-MCNC: <0.3 MG/DL — LOW (ref 0.5–1.3)
EGFR: 114 ML/MIN/1.73M2 — SIGNIFICANT CHANGE UP
EOSINOPHIL # BLD AUTO: 0.03 K/UL — SIGNIFICANT CHANGE UP (ref 0–0.5)
EOSINOPHIL NFR BLD AUTO: 0.2 % — SIGNIFICANT CHANGE UP (ref 0–6)
GLUCOSE BLDC GLUCOMTR-MCNC: 135 MG/DL — HIGH (ref 70–99)
GLUCOSE BLDC GLUCOMTR-MCNC: 148 MG/DL — HIGH (ref 70–99)
GLUCOSE BLDC GLUCOMTR-MCNC: 172 MG/DL — HIGH (ref 70–99)
GLUCOSE SERPL-MCNC: 140 MG/DL — HIGH (ref 70–99)
HCT VFR BLD CALC: 29.6 % — LOW (ref 34.5–45)
HGB BLD-MCNC: 8.8 G/DL — LOW (ref 11.5–15.5)
IMM GRANULOCYTES NFR BLD AUTO: 1.1 % — HIGH (ref 0–0.9)
INR BLD: 1.07 RATIO — SIGNIFICANT CHANGE UP (ref 0.85–1.18)
LYMPHOCYTES # BLD AUTO: 1.24 K/UL — SIGNIFICANT CHANGE UP (ref 1–3.3)
LYMPHOCYTES # BLD AUTO: 7.7 % — LOW (ref 13–44)
MAGNESIUM SERPL-MCNC: 2.2 MG/DL — SIGNIFICANT CHANGE UP (ref 1.6–2.6)
MCHC RBC-ENTMCNC: 26.7 PG — LOW (ref 27–34)
MCHC RBC-ENTMCNC: 29.7 GM/DL — LOW (ref 32–36)
MCV RBC AUTO: 90 FL — SIGNIFICANT CHANGE UP (ref 80–100)
MONOCYTES # BLD AUTO: 0.66 K/UL — SIGNIFICANT CHANGE UP (ref 0–0.9)
MONOCYTES NFR BLD AUTO: 4.1 % — SIGNIFICANT CHANGE UP (ref 2–14)
NEUTROPHILS # BLD AUTO: 14.01 K/UL — HIGH (ref 1.8–7.4)
NEUTROPHILS NFR BLD AUTO: 86.8 % — HIGH (ref 43–77)
NRBC # BLD: 0 /100 WBCS — SIGNIFICANT CHANGE UP (ref 0–0)
PHOSPHATE SERPL-MCNC: 1.8 MG/DL — LOW (ref 2.5–4.5)
PLATELET # BLD AUTO: 215 K/UL — SIGNIFICANT CHANGE UP (ref 150–400)
POTASSIUM SERPL-MCNC: 4.6 MMOL/L — SIGNIFICANT CHANGE UP (ref 3.5–5.3)
POTASSIUM SERPL-SCNC: 4.6 MMOL/L — SIGNIFICANT CHANGE UP (ref 3.5–5.3)
PROT SERPL-MCNC: 4.8 G/DL — LOW (ref 6–8.3)
PROTHROM AB SERPL-ACNC: 11.2 SEC — SIGNIFICANT CHANGE UP (ref 9.5–13)
RBC # BLD: 3.29 M/UL — LOW (ref 3.8–5.2)
RBC # FLD: 17.4 % — HIGH (ref 10.3–14.5)
SODIUM SERPL-SCNC: 142 MMOL/L — SIGNIFICANT CHANGE UP (ref 135–145)
WBC # BLD: 16.13 K/UL — HIGH (ref 3.8–10.5)
WBC # FLD AUTO: 16.13 K/UL — HIGH (ref 3.8–10.5)

## 2024-02-05 PROCEDURE — 97598 DBRDMT OPN WND ADDL 20CM/<: CPT

## 2024-02-05 PROCEDURE — 99291 CRITICAL CARE FIRST HOUR: CPT

## 2024-02-05 PROCEDURE — 99233 SBSQ HOSP IP/OBS HIGH 50: CPT | Mod: 25

## 2024-02-05 PROCEDURE — 99232 SBSQ HOSP IP/OBS MODERATE 35: CPT

## 2024-02-05 PROCEDURE — 97597 DBRDMT OPN WND 1ST 20 CM/<: CPT

## 2024-02-05 PROCEDURE — 76604 US EXAM CHEST: CPT | Mod: 26,GC

## 2024-02-05 RX ORDER — SODIUM HYPOCHLORITE 0.125 %
1 SOLUTION, NON-ORAL MISCELLANEOUS EVERY 12 HOURS
Refills: 0 | Status: DISCONTINUED | OUTPATIENT
Start: 2024-02-05 | End: 2024-02-20

## 2024-02-05 RX ORDER — DROXIDOPA 100 MG/1
400 CAPSULE ORAL EVERY 8 HOURS
Refills: 0 | Status: DISCONTINUED | OUTPATIENT
Start: 2024-02-05 | End: 2024-02-07

## 2024-02-05 RX ORDER — CEFEPIME 1 G/1
INJECTION, POWDER, FOR SOLUTION INTRAMUSCULAR; INTRAVENOUS
Refills: 0 | Status: DISCONTINUED | OUTPATIENT
Start: 2024-02-05 | End: 2024-02-06

## 2024-02-05 RX ORDER — CEFEPIME 1 G/1
2000 INJECTION, POWDER, FOR SOLUTION INTRAMUSCULAR; INTRAVENOUS ONCE
Refills: 0 | Status: COMPLETED | OUTPATIENT
Start: 2024-02-05 | End: 2024-02-05

## 2024-02-05 RX ORDER — CEFEPIME 1 G/1
2000 INJECTION, POWDER, FOR SOLUTION INTRAMUSCULAR; INTRAVENOUS EVERY 8 HOURS
Refills: 0 | Status: DISCONTINUED | OUTPATIENT
Start: 2024-02-05 | End: 2024-02-06

## 2024-02-05 RX ORDER — MIDODRINE HYDROCHLORIDE 2.5 MG/1
10 TABLET ORAL EVERY 8 HOURS
Refills: 0 | Status: DISCONTINUED | OUTPATIENT
Start: 2024-02-05 | End: 2024-02-15

## 2024-02-05 RX ORDER — METRONIDAZOLE 500 MG
500 TABLET ORAL EVERY 12 HOURS
Refills: 0 | Status: DISCONTINUED | OUTPATIENT
Start: 2024-02-05 | End: 2024-02-20

## 2024-02-05 RX ORDER — HYDROCORTISONE 20 MG
25 TABLET ORAL EVERY 12 HOURS
Refills: 0 | Status: DISCONTINUED | OUTPATIENT
Start: 2024-02-05 | End: 2024-02-07

## 2024-02-05 RX ADMIN — SERTRALINE 25 MILLIGRAM(S): 25 TABLET, FILM COATED ORAL at 12:51

## 2024-02-05 RX ADMIN — DROXIDOPA 300 MILLIGRAM(S): 100 CAPSULE ORAL at 05:32

## 2024-02-05 RX ADMIN — Medication 1 MILLIGRAM(S): at 12:52

## 2024-02-05 RX ADMIN — Medication 85 MILLIMOLE(S): at 03:46

## 2024-02-05 RX ADMIN — Medication 25 MILLIGRAM(S): at 18:15

## 2024-02-05 RX ADMIN — MIDODRINE HYDROCHLORIDE 10 MILLIGRAM(S): 2.5 TABLET ORAL at 13:51

## 2024-02-05 RX ADMIN — Medication 100 MILLIGRAM(S): at 05:34

## 2024-02-05 RX ADMIN — MIDODRINE HYDROCHLORIDE 10 MILLIGRAM(S): 2.5 TABLET ORAL at 05:32

## 2024-02-05 RX ADMIN — Medication 1: at 05:34

## 2024-02-05 RX ADMIN — Medication 1 APPLICATION(S): at 05:32

## 2024-02-05 RX ADMIN — Medication 1 DROP(S): at 18:17

## 2024-02-05 RX ADMIN — CEFEPIME 100 MILLIGRAM(S): 1 INJECTION, POWDER, FOR SOLUTION INTRAMUSCULAR; INTRAVENOUS at 21:29

## 2024-02-05 RX ADMIN — Medication 1 TABLET(S): at 12:50

## 2024-02-05 RX ADMIN — TAMOXIFEN CITRATE 20 MILLIGRAM(S): 20 TABLET, FILM COATED ORAL at 12:52

## 2024-02-05 RX ADMIN — Medication 3.4 MICROGRAM(S)/KG/MIN: at 19:50

## 2024-02-05 RX ADMIN — Medication 50 MILLIGRAM(S): at 05:31

## 2024-02-05 RX ADMIN — HEPARIN SODIUM 5000 UNIT(S): 5000 INJECTION INTRAVENOUS; SUBCUTANEOUS at 18:16

## 2024-02-05 RX ADMIN — Medication 1 APPLICATION(S): at 18:16

## 2024-02-05 RX ADMIN — DROXIDOPA 400 MILLIGRAM(S): 100 CAPSULE ORAL at 13:50

## 2024-02-05 RX ADMIN — Medication 100 MILLIGRAM(S): at 12:52

## 2024-02-05 RX ADMIN — Medication 100 MILLIGRAM(S): at 18:15

## 2024-02-05 RX ADMIN — DROXIDOPA 400 MILLIGRAM(S): 100 CAPSULE ORAL at 21:29

## 2024-02-05 RX ADMIN — Medication 80 MICROGRAM(S): at 21:39

## 2024-02-05 RX ADMIN — Medication 1 DROP(S): at 05:33

## 2024-02-05 RX ADMIN — CHLORHEXIDINE GLUCONATE 1 APPLICATION(S): 213 SOLUTION TOPICAL at 05:33

## 2024-02-05 RX ADMIN — HEPARIN SODIUM 5000 UNIT(S): 5000 INJECTION INTRAVENOUS; SUBCUTANEOUS at 05:32

## 2024-02-05 RX ADMIN — Medication 500 MILLIGRAM(S): at 12:50

## 2024-02-05 RX ADMIN — CEFEPIME 100 MILLIGRAM(S): 1 INJECTION, POWDER, FOR SOLUTION INTRAMUSCULAR; INTRAVENOUS at 13:50

## 2024-02-05 NOTE — PROGRESS NOTE ADULT - ASSESSMENT
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 Admitted for sepsis likely 2/2 to wound infection vs osteomyelitis.    Wound Consult requested to assist w/ management of  Sacral/ Buttocks /rt  hip stage 4 pressure injury  left shoulder/ hip/spine unstageable pressure injury  Bilateral Ear DTI  Incontinence Associated Dermatitis  Lt foot DTI    Rt Hip & Sacrum  -continue w/ pack w/ 1/4 strength Dakins dressing BID  Rt buttocks - Pack w/  Aquacel dressing QD  Lt Hip /lt shoulder- Betadine + ALLEVYN QD  Spine - betadine + Aquacel + Allevyn QD  Bilateral ears - cavilon QD  Buttocks CAVILON Advance TIW and pericare BID and prn soiling        Continue w/ attends under pads and Pericare as per protocol  Left foot DTI--Betadine QD  Appreciate Palliative & GOC- pt full code- all treatments  Abx per MICU/ ID  Moisturize intact skin w/ SWEEN cream BID  Nutrition optimization Severe protein-calorie malnutrition       high quality protein TF, mor/ prosource, MVI & Vit C to promote wound healing  Continue turning and positioning w/ offloading assistive devices as per protocol  Waffle Cushion to chair when oob to chair  Continue w/ low air loss pressure redistribution bed surface   Pt will need Group 2 mattress on hospital bed and ROHO cushion for wheel chair upon discharge home  Care as per micu, will follow w/ you  Upon discharge f/u as outpatient at Wound [facility] [address] [facility] [phone]  D/w team & RN & attng  Codi Thompson PA-C [id number]  Nights/ Weekends/ Holidays please call:  General Surgery Consult pager ([phone]) for emergencies  Wound PT for multilayer leg wrapping or VAC issues ([phone])   I spent 50 minutes face to face w/ this pt of which more than 50% of the time was spent counseling & coordinating care of this pt.

## 2024-02-05 NOTE — PROGRESS NOTE ADULT - ASSESSMENT
69 yo F with hx TBI (nonverbal, bedbound/contracted at baseline) s/p PEG last admission due to recurrent hypernatremia and chronic malnutrition, CVA, hypothyroidism, L breast cancer (s/p mastectomy tissue expander still in place ) who presented for decreased responsiveness and hypotension, found to have sepsis with MSSA bacteremia, likely source sacral ulcer w/ c/f possible osteomyelitis. Now being transferred to MICU for hypotension 2/2 likely septic shock, requiring pressors.     ======== Neuro =================  - AOx0, nonverbal c/w baseline mental status    ========Cardiovascular============  # shock, likely septic ISO sacral decub ulcer  - home med: midodrine 5 mg q8h  - d/c'd midodrine due to bradycardia, now increased droxidopa to 300mg q8h   - on levophed, pressure requirements decreasing- will wean as tolerated   - sepsis workup/tx as outlined in ID plan    #QT prolongation  - QTc 561  - K>4, Mg>2  - avoid QT prolonging agents    ==========Respiratory===============  - basal consolidation seen on POCUS, likely 2/2 aspiration PNA vs atelectasis  - on empiric cefazolin  - on 2L NC, SpO2 appropriate  - CTA negative for PE  - wean O2 as tolerated   - will obtain MRSA      ===========GI/nutrition============  > s/p PEG  - diet NPO w/ TFs    > constipation, chronic  - cont miralax, senna    ===========RENAL/==============  - renal function at baseline, monitor for JAVIER ISO hemodynamic instability  - strict I/Os  # avila  - no avila at baseline  - TOV today    ========== SKIN =================  - extensive lumbar/sacral/gluteal unstageable pressure wounds   - MRI spine with prominent sacral decubitus ulcer extending to the coccygeal tip without evidence of underlying osteomyelitis or discrete fluid collections in the surrounding soft tissues  - blood cultures growing MSSA   - wound care following, appreciate recs  - continue cefazolin  - f/u wound cultures    =========== ID ==============  > sepsis  > MSSA bacteremia  > sacral wound, likely source of sepsis/bacteremia  - WBC 15k, hypotensive 2/2 likely septic shock- intiailly has been downtrending to 11k now up to 14K on 2/4  - ABG lactate 2.0  - continue cefazolin  - f/u repeat 2/2 BCx negative,   - fu repeat MRSA, UA and wound culture   - repeat blood cx q48h until clear  - f/u TTE     ========== ENDOCRINE ===========  > hypothyroidism  - continue levothyroxine     ==========Heme/onc/DVT========  # hx breast cancer s/p mastectomy  - continue tamoxifen    # DVT ppx  - heparin SQ     #Ethics  - code status: FULL    69 yo F with hx TBI (nonverbal, bedbound/contracted at baseline) s/p PEG last admission due to recurrent hypernatremia and chronic malnutrition, CVA, hypothyroidism, L breast cancer (s/p mastectomy tissue expander still in place ) who presented for decreased responsiveness and hypotension, found to have sepsis with MSSA bacteremia, likely source sacral ulcer w/ c/f possible osteomyelitis. Now being transferred to MICU for hypotension 2/2 likely septic shock, requiring pressors.     ======== Neuro =================  - AOx0, nonverbal c/w baseline mental status    ========Cardiovascular============  # shock, likely septic ISO sacral decub ulcer  - home med: midodrine 5 mg q8h  - d/c'd midodrine due to bradycardia, now restarted at 10 mg q8h as HRs normalized   - droxidopa, increase to 400 mg q8h  - still on levophed, pressor requirements decreasing  - sepsis workup/tx as outlined in ID plan  - on hydrocortisone 25 mg q12h - not on chronic steroids. Taper hydrocortisone to 25 mg q12h    #QT prolongation  - QTc 561, 470s on repeat  - K>4, Mg>2  - avoid QT prolonging agents    ==========Respiratory===============  - basal consolidation seen on POCUS, aspiration PNA vs atelectasis  - on empiric cefazolin  - on 2L NC, SpO2 appropriate  - CTA negative for PE  - wean O2 as tolerated   - MRSA swab negative      ===========GI/nutrition============  > s/p PEG  - diet NPO w/ TFs    > constipation, chronic  - cont miralax, senna    ===========RENAL/==============  - renal function at baseline, monitor for JAVIER ISO hemodynamic instability  - strict I/Os  # avila  - no avila at baseline  - TOV     ========== SKIN =================  - extensive lumbar/sacral/gluteal unstageable pressure wounds   - MRI spine with prominent sacral decubitus ulcer extending to the coccygeal tip without evidence of underlying osteomyelitis or discrete fluid collections in the surrounding soft tissues  - blood cultures growing MSSA 1/31, subsequent cultures NGTD   - wound care following, appreciate recs  - continue cefazolin  - f/u wound cultures    =========== ID ==============  > sepsis  > MSSA bacteremia  > sacral wound, likely source of sepsis/bacteremia  - WBC 15k, hypotensive 2/2 likely septic shock- initially has been downtrending to 11k now up to 14K on 2/4  - ABG lactate 2.0  - continue cefazolin  - repeat blood cultures negative, clear as of 1/1   - unable to undergo TTE due to contractures   - ID following, f/u recs     ========== ENDOCRINE ===========  > hypothyroidism  - continue levothyroxine     ==========Heme/onc/DVT========  # hx breast cancer s/p mastectomy  - continue tamoxifen    # DVT ppx  - heparin SQ     #Ethics  - code status: FULL

## 2024-02-05 NOTE — PROGRESS NOTE ADULT - SUBJECTIVE AND OBJECTIVE BOX
[facility]-- WOUND TEAM -- FOLLOW UP NOTE  --------------------------------------------------------------------------------    24 hour events/subjective:          Diet:  Diet, NPO with Tube Feed:   Tube Feeding Modality: Gastrostomy  Glucerna 1.5 Vidal (GLUCERNA1.5RTH)  Total Volume for 24 Hours (mL): 990  Continuous  Starting Tube Feed Rate mL per Hour: 10  Increase Tube Feed Rate by (mL): 10     Every 4 hours  Until Goal Tube Feed Rate (mL per Hour): 55  Tube Feed Duration (in Hours): 18  Tube Feed Start Time: 07:30   Frequency: Every 4 Hours (02-04-24 @ 21:20)      ROS: pt unable to offer    ALLERGIES & MEDICATIONS  --------------------------------------------------------------------------------  Allergies  Tapazole (Hives)        STANDING INPATIENT MEDICATIONS  artificial  tears Solution 1 Drop(s) Both EYES two times a day  ascorbic acid 500 milliGRAM(s) Oral daily  cefepime   IVPB 2000 milliGRAM(s) IV Intermittent every 8 hours  chlorhexidine 2% Cloths 1 Application(s) Topical <User Schedule>  collagenase Ointment 1 Application(s) Topical two times a day  Dakins Solution - 1/4 Strength 1 Application(s) Topical every 12 hours  droxidopa 400 milliGRAM(s) Oral every 8 hours  folic acid 1 milliGRAM(s) Oral daily  heparin   Injectable 5000 Unit(s) SubCutaneous every 12 hours  hydrocortisone sodium succinate Injectable 25 milliGRAM(s) IV Push every 12 hours  insulin lispro (ADMELOG) corrective regimen sliding scale   SubCutaneous every 6 hours  levothyroxine Injectable 80 MICROGram(s) IV Push at bedtime  metroNIDAZOLE  IVPB 500 milliGRAM(s) IV Intermittent every 12 hours  midodrine 10 milliGRAM(s) Oral every 8 hours  multivitamin 1 Tablet(s) Oral daily  norepinephrine Infusion 0.05 MICROgram(s)/kG/Min IV Continuous <Continuous>  polyethylene glycol 3350 17 Gram(s) Oral daily  senna 2 Tablet(s) Oral at bedtime  sertraline 25 milliGRAM(s) Oral daily  tamoxifen 20 milliGRAM(s) Oral daily  thiamine 100 milliGRAM(s) Oral daily      PRN INPATIENT MEDICATION  HYDROmorphone  Injectable 0.2 milliGRAM(s) IV Push every 8 hours PRN        VITALS/PHYSICAL EXAM  --------------------------------------------------------------------------------  T(C): 36.8 (02-05-24 @ 13:00), Max: 37.6 (02-04-24 @ 20:00)  HR: 77 (02-05-24 @ 15:30) (47 - 84)  BP: 192/95 (02-05-24 @ 15:30) (82/51 - 192/95)  RR: 25 (02-05-24 @ 15:30) (19 - 36)  SpO2: 93% (02-05-24 @ 15:30) (84% - 100%)  Wt(kg): --              LABS/ CULTURES/ RADIOLOGY:              8.8    16.13 >-----------<  215      [02-05-24 @ 01:42]              29.6     142  |  108  |  14  ----------------------------<  140      [02-05-24 @ 00:29]  4.6   |  23  |  <0.30        Ca     7.1     [02-05-24 @ 00:29]      Mg     2.2     [02-05-24 @ 00:29]      Phos  1.8     [02-05-24 @ 00:29]    TPro  4.8  /  Alb  2.5  /  TBili  0.3  /  DBili  x   /  AST  16  /  ALT  12  /  AlkPhos  76  [02-05-24 @ 00:29]    PT/INR: PT 11.2 , INR 1.07       [02-05-24 @ 00:29]  PTT: 23.8       [02-05-24 @ 00:29]      A1C with Estimated Average Glucose Result: 5.8 % (01-31-24 @ 06:48)  A1C with Estimated Average Glucose Result: 5.7 % (09-19-23 @ 10:33)    CAPILLARY BLOOD GLUCOSE  POCT Blood Glucose.: 135 mg/dL (05 Feb 2024 12:34)  POCT Blood Glucose.: 172 mg/dL (05 Feb 2024 04:50)  POCT Blood Glucose.: 142 mg/dL (04 Feb 2024 17:07)    Culture - Blood (collected 02-02-24 @ 18:47)  Source: .Blood Blood-Peripheral  Preliminary Report (02-04-24 @ 23:02):    No growth at 48 Hours    Culture - Blood (collected 02-02-24 @ 18:30)  Source: .Blood Blood-Peripheral  Preliminary Report (02-04-24 @ 23:02):    No growth at 48 Hours    Culture - Blood (collected 02-02-24 @ 09:23)  Source: .Blood Blood-Peripheral  Preliminary Report (02-04-24 @ 17:01):    No growth at 48 Hours       [facility]-- WOUND TEAM -- FOLLOW UP NOTE  --------------------------------------------------------------------------------    24 hour events/subjective:    pt remains in micu  afebrile  alert  tolerating tf  incontinent  tolerating dressing changes, no odor or excess drainage      Diet:  Diet, NPO with Tube Feed:   Tube Feeding Modality: Gastrostomy  Glucerna 1.5 Vidal (GLUCERNA1.5RTH)  Total Volume for 24 Hours (mL): 990  Continuous  Starting Tube Feed Rate mL per Hour: 10  Increase Tube Feed Rate by (mL): 10     Every 4 hours  Until Goal Tube Feed Rate (mL per Hour): 55  Tube Feed Duration (in Hours): 18  Tube Feed Start Time: 07:30   Frequency: Every 4 Hours (02-04-24 @ 21:20)      ROS: pt unable to offer    ALLERGIES & MEDICATIONS  --------------------------------------------------------------------------------  Allergies  Tapazole (Hives)        STANDING INPATIENT MEDICATIONS  artificial  tears Solution 1 Drop(s) Both EYES two times a day  ascorbic acid 500 milliGRAM(s) Oral daily  cefepime   IVPB 2000 milliGRAM(s) IV Intermittent every 8 hours  chlorhexidine 2% Cloths 1 Application(s) Topical <User Schedule>  collagenase Ointment 1 Application(s) Topical two times a day  Dakins Solution - 1/4 Strength 1 Application(s) Topical every 12 hours  droxidopa 400 milliGRAM(s) Oral every 8 hours  folic acid 1 milliGRAM(s) Oral daily  heparin   Injectable 5000 Unit(s) SubCutaneous every 12 hours  hydrocortisone sodium succinate Injectable 25 milliGRAM(s) IV Push every 12 hours  insulin lispro (ADMELOG) corrective regimen sliding scale   SubCutaneous every 6 hours  levothyroxine Injectable 80 MICROGram(s) IV Push at bedtime  metroNIDAZOLE  IVPB 500 milliGRAM(s) IV Intermittent every 12 hours  midodrine 10 milliGRAM(s) Oral every 8 hours  multivitamin 1 Tablet(s) Oral daily  norepinephrine Infusion 0.05 MICROgram(s)/kG/Min IV Continuous <Continuous>  polyethylene glycol 3350 17 Gram(s) Oral daily  senna 2 Tablet(s) Oral at bedtime  sertraline 25 milliGRAM(s) Oral daily  tamoxifen 20 milliGRAM(s) Oral daily  thiamine 100 milliGRAM(s) Oral daily      PRN INPATIENT MEDICATION  HYDROmorphone  Injectable 0.2 milliGRAM(s) IV Push every 8 hours PRN        VITALS/PHYSICAL EXAM  --------------------------------------------------------------------------------  T(C): 36.8 (02-05-24 @ 13:00), Max: 37.6 (02-04-24 @ 20:00)  HR: 77 (02-05-24 @ 15:30) (47 - 84)  BP: 192/95 (02-05-24 @ 15:30) (82/51 - 192/95)  RR: 25 (02-05-24 @ 15:30) (19 - 36)  SpO2: 93% (02-05-24 @ 15:30) (84% - 100%)  Wt(kg): --      NAD, alert, cachectic, frail  WD/ WN/  WG  [facility] Sport bed  HEENT:  NC/AT, sclera clear, mucosa moist, throat clear, trachea midline, neck supple    Bilateral ear helix/pinna w/ deep maroon skin changes w/o blistering noted unchanged from admission  Respiratory: nonlabored w/ equal chest rise  Gastrointestinal: soft NT/ND (+)PEG  Neurology:  nonverbal, no follow commands, paraplegic  Psych: calm/ appropriate  Musculoskeletal: stiff pROM, w/ contractures  Vascular: BLE equally warn,  no cyanosis, clubbing, nor acute ischemia         BLE edema equal         BLE DP pulses palpable  Skin:  thin, dry, pale, frail,  ecchymosis w/o hematoma  lower buttocks w/ hyperpigmentation of incontinence   left medial foot over great toe and bunion     w/ hyperpigmented skin w/ resolving DTI   left trochanter (HIP) unstageable pressure injury     w/ deep purple and black skin changes no blistering     5cm x 6cm   left posterior shoulder unstageable pressure injury      soft black eschar w/o blistering     scant serosanguinous drainage    Right (hip) stage 4 pressure injury    4cm x 4cm x 2,5cm w/ undermining greatest at 9:00 of 4cm       post debridement measurements unchanged but w/ much less           slough  tissue w/ undermining      An adjacent area of partial thickness skin loss w/ slough       1.5 cm x 0.6cm x 0.2cm        (+)serosanguinous drainage  Right upper buttock stage 4 pressure  injury   mostly granular w/ scant slough tissue   5 cm  x 4 cm X 1cm   (+) serosanguinous discharge  sacrum stage 4 pressure injury    w/ slough and granular tissue w/ hyperpigmented periwound skin    8cm x 6cm x 1cm.  w/ post debridement 8cm x 6cm x 1cm         w/ undermining greatest of 4cm at 2:00     (+)serosanguinous drainage  Procedure Note  Using aseptic technique Rt hip and sacral wounds were excisionally debrided using a scissor and forceps through necrotic/ & nonviable dermis subcutaneous tissue and gluteal muscle.  Pt tolerated procedure well.  Hemostasis was maintained throughout.    Debulking debridement of necrotic tissue.      thoracic spine unstageable pressure injury    soft grey/ brown eschar     10cm x 3cm  no new blistering    scant serosanguinous drainage  No odor, erythema, increased warmth, tenderness, induration, fluctuance, nor crepitus      LABS/ CULTURES/ RADIOLOGY:              8.8    16.13 >-----------<  215      [02-05-24 @ 01:42]              29.6     142  |  108  |  14  ----------------------------<  140      [02-05-24 @ 00:29]  4.6   |  23  |  <0.30        Ca     7.1     [02-05-24 @ 00:29]      Mg     2.2     [02-05-24 @ 00:29]      Phos  1.8     [02-05-24 @ 00:29]    TPro  4.8  /  Alb  2.5  /  TBili  0.3  /  DBili  x   /  AST  16  /  ALT  12  /  AlkPhos  76  [02-05-24 @ 00:29]    PT/INR: PT 11.2 , INR 1.07       [02-05-24 @ 00:29]  PTT: 23.8       [02-05-24 @ 00:29]      A1C with Estimated Average Glucose Result: 5.8 % (01-31-24 @ 06:48)  A1C with Estimated Average Glucose Result: 5.7 % (09-19-23 @ 10:33)    CAPILLARY BLOOD GLUCOSE  POCT Blood Glucose.: 135 mg/dL (05 Feb 2024 12:34)  POCT Blood Glucose.: 172 mg/dL (05 Feb 2024 04:50)  POCT Blood Glucose.: 142 mg/dL (04 Feb 2024 17:07)    Culture - Blood (collected 02-02-24 @ 18:47)  Source: .Blood Blood-Peripheral  Preliminary Report (02-04-24 @ 23:02):    No growth at 48 Hours    Culture - Blood (collected 02-02-24 @ 18:30)  Source: .Blood Blood-Peripheral  Preliminary Report (02-04-24 @ 23:02):    No growth at 48 Hours    Culture - Blood (collected 02-02-24 @ 09:23)  Source: .Blood Blood-Peripheral  Preliminary Report (02-04-24 @ 17:01):    No growth at 48 Hours

## 2024-02-05 NOTE — PROGRESS NOTE ADULT - SUBJECTIVE AND OBJECTIVE BOX
Gaby Daniel PGY-1    Patient is a 68y old  Female who presents with a chief complaint of Sepsis 2/2 osteomyelitis (04 Feb 2024 07:40)      SUBJECTIVE / OVERNIGHT EVENTS:  - still on levo 0.05  - increased droxidopa to 300 q8h  - restarted midodrine 10 q8h  - d/c'd free water     MEDICATIONS  (STANDING):  artificial  tears Solution 1 Drop(s) Both EYES two times a day  ascorbic acid 500 milliGRAM(s) Oral daily  ceFAZolin   IVPB      ceFAZolin   IVPB 2000 milliGRAM(s) IV Intermittent every 8 hours  chlorhexidine 2% Cloths 1 Application(s) Topical <User Schedule>  collagenase Ointment 1 Application(s) Topical two times a day  Dakins Solution - 1/4 Strength 1 Application(s) Topical every 12 hours  droxidopa 300 milliGRAM(s) Oral every 8 hours  folic acid 1 milliGRAM(s) Oral daily  heparin   Injectable 5000 Unit(s) SubCutaneous every 12 hours  hydrocortisone sodium succinate Injectable 50 milliGRAM(s) IV Push every 12 hours  insulin lispro (ADMELOG) corrective regimen sliding scale   SubCutaneous every 6 hours  levothyroxine Injectable 80 MICROGram(s) IV Push at bedtime  midodrine 10 milliGRAM(s) Oral every 8 hours  multivitamin 1 Tablet(s) Oral daily  norepinephrine Infusion 0.05 MICROgram(s)/kG/Min (3.4 mL/Hr) IV Continuous <Continuous>  polyethylene glycol 3350 17 Gram(s) Oral daily  senna 2 Tablet(s) Oral at bedtime  sertraline 25 milliGRAM(s) Oral daily  tamoxifen 20 milliGRAM(s) Oral daily  thiamine 100 milliGRAM(s) Oral daily    MEDICATIONS  (PRN):  HYDROmorphone  Injectable 0.2 milliGRAM(s) IV Push every 8 hours PRN dressing change    Allergies    Tapazole (Hives)    Intolerances        Vital Signs Last 24 Hrs  T(C): 37.4 (05 Feb 2024 04:00), Max: 37.6 (04 Feb 2024 20:00)  T(F): 99.3 (05 Feb 2024 04:00), Max: 99.7 (04 Feb 2024 20:00)  HR: 60 (05 Feb 2024 07:00) (56 - 84)  BP: 101/51 (05 Feb 2024 07:00) (82/52 - 175/108)  BP(mean): 72 (05 Feb 2024 07:00) (62 - 135)  RR: 25 (05 Feb 2024 07:00) (17 - 26)  SpO2: 95% (05 Feb 2024 07:00) (84% - 100%)    Parameters below as of 04 Feb 2024 20:00  Patient On (Oxygen Delivery Method): nasal cannula  O2 Flow (L/min): 2    Daily     Daily   I&O's Summary    04 Feb 2024 07:01  -  05 Feb 2024 07:00  --------------------------------------------------------  IN: 2474.4 mL / OUT: 1450 mL / NET: 1024.4 mL    Physical Exam  General: A&Ox0, NAD, more awake, eyes open  and squeezes hands to command  HEENT: mucous membranes slightly dry  Neck: supple  Chest/Lungs: CTAB, no WRR  Heart: bradycardic, regular rhythm, no murmurs. No cyanosis  Abdomen: soft, nontender, nondistended   Extremities: contracted c/w baseline, no edema, palpable pulses  Skin: multiple pressure injuries to sacrum/right hip/buttocks, left shoulder/hip/spine, L foot - not examined today due to patient positioning  Neuro: awake, nonverbal (c/w baseline) but opens eyes and squeezes hands to command    DIAGNOSTICS:                         8.8    16.13 )-----------( 215      ( 05 Feb 2024 01:42 )             29.6     Hgb Trend: 8.8<--, 8.8<--, 8.0<--, 7.5<--, 8.7<--  02-05    142  |  108  |  14  ----------------------------<  140<H>  4.6   |  23  |  <0.30<L>    Ca    7.1<L>      05 Feb 2024 00:29  Phos  1.8     02-05  Mg     2.2     02-05    TPro  4.8<L>  /  Alb  2.5<L>  /  TBili  0.3  /  DBili  x   /  AST  16  /  ALT  12  /  AlkPhos  76  02-05    CAPILLARY BLOOD GLUCOSE      POCT Blood Glucose.: 172 mg/dL (05 Feb 2024 04:50)  POCT Blood Glucose.: 142 mg/dL (04 Feb 2024 17:07)  POCT Blood Glucose.: 179 mg/dL (04 Feb 2024 13:04)    Creatinine Trend: <0.30<--, <0.30<--, <0.30<--, <0.30<--, 0.38<--, <0.30<--  LIVER FUNCTIONS - ( 05 Feb 2024 00:29 )  Alb: 2.5 g/dL / Pro: 4.8 g/dL / ALK PHOS: 76 U/L / ALT: 12 U/L / AST: 16 U/L / GGT: x           PT/INR - ( 05 Feb 2024 00:29 )   PT: 11.2 sec;   INR: 1.07 ratio         PTT - ( 05 Feb 2024 00:29 )  PTT:23.8 sec      Urinalysis Basic - ( 05 Feb 2024 00:29 )    Color: x / Appearance: x / SG: x / pH: x  Gluc: 140 mg/dL / Ketone: x  / Bili: x / Urobili: x   Blood: x / Protein: x / Nitrite: x   Leuk Esterase: x / RBC: x / WBC x   Sq Epi: x / Non Sq Epi: x / Bacteria: x     Gaby Daniel PGY-1    Patient is a 68y old  Female who presents with a chief complaint of Sepsis 2/2 osteomyelitis (04 Feb 2024 07:40)      SUBJECTIVE / OVERNIGHT EVENTS:  - still on levo 0.05  - increased droxidopa to 300 q8h  - restarted midodrine 10 q8h  - d/c'd free water     MEDICATIONS  (STANDING):  artificial  tears Solution 1 Drop(s) Both EYES two times a day  ascorbic acid 500 milliGRAM(s) Oral daily  ceFAZolin   IVPB      ceFAZolin   IVPB 2000 milliGRAM(s) IV Intermittent every 8 hours  chlorhexidine 2% Cloths 1 Application(s) Topical <User Schedule>  collagenase Ointment 1 Application(s) Topical two times a day  Dakins Solution - 1/4 Strength 1 Application(s) Topical every 12 hours  droxidopa 300 milliGRAM(s) Oral every 8 hours  folic acid 1 milliGRAM(s) Oral daily  heparin   Injectable 5000 Unit(s) SubCutaneous every 12 hours  hydrocortisone sodium succinate Injectable 50 milliGRAM(s) IV Push every 12 hours  insulin lispro (ADMELOG) corrective regimen sliding scale   SubCutaneous every 6 hours  levothyroxine Injectable 80 MICROGram(s) IV Push at bedtime  midodrine 10 milliGRAM(s) Oral every 8 hours  multivitamin 1 Tablet(s) Oral daily  norepinephrine Infusion 0.05 MICROgram(s)/kG/Min (3.4 mL/Hr) IV Continuous <Continuous>  polyethylene glycol 3350 17 Gram(s) Oral daily  senna 2 Tablet(s) Oral at bedtime  sertraline 25 milliGRAM(s) Oral daily  tamoxifen 20 milliGRAM(s) Oral daily  thiamine 100 milliGRAM(s) Oral daily    MEDICATIONS  (PRN):  HYDROmorphone  Injectable 0.2 milliGRAM(s) IV Push every 8 hours PRN dressing change    Allergies    Tapazole (Hives)    Intolerances        Vital Signs Last 24 Hrs  T(C): 37.4 (05 Feb 2024 04:00), Max: 37.6 (04 Feb 2024 20:00)  T(F): 99.3 (05 Feb 2024 04:00), Max: 99.7 (04 Feb 2024 20:00)  HR: 60 (05 Feb 2024 07:00) (56 - 84)  BP: 101/51 (05 Feb 2024 07:00) (82/52 - 175/108)  BP(mean): 72 (05 Feb 2024 07:00) (62 - 135)  RR: 25 (05 Feb 2024 07:00) (17 - 26)  SpO2: 95% (05 Feb 2024 07:00) (84% - 100%)    Parameters below as of 04 Feb 2024 20:00  Patient On (Oxygen Delivery Method): nasal cannula  O2 Flow (L/min): 2    Daily     Daily   I&O's Summary    04 Feb 2024 07:01  -  05 Feb 2024 07:00  --------------------------------------------------------  IN: 2474.4 mL / OUT: 1450 mL / NET: 1024.4 mL    Physical Exam  General: A&Ox0, NAD, more awake, eyes open  and squeezes hands to command  HEENT: mucous membranes slightly dry  Neck: supple  Chest/Lungs: CTAB, no WRR  Heart: RRR, no murmurs. No cyanosis  Abdomen: soft, nontender, nondistended   Extremities: contracted c/w baseline, no edema, palpable pulses  Skin: multiple pressure injuries to sacrum/right hip/buttocks, left shoulder/hip, lumbar spine, L foot   Neuro: awake, nonverbal (c/w baseline) but opens eyes and squeezes hands to command    DIAGNOSTICS:                         8.8    16.13 )-----------( 215      ( 05 Feb 2024 01:42 )             29.6     Hgb Trend: 8.8<--, 8.8<--, 8.0<--, 7.5<--, 8.7<--  02-05    142  |  108  |  14  ----------------------------<  140<H>  4.6   |  23  |  <0.30<L>    Ca    7.1<L>      05 Feb 2024 00:29  Phos  1.8     02-05  Mg     2.2     02-05    TPro  4.8<L>  /  Alb  2.5<L>  /  TBili  0.3  /  DBili  x   /  AST  16  /  ALT  12  /  AlkPhos  76  02-05    CAPILLARY BLOOD GLUCOSE      POCT Blood Glucose.: 172 mg/dL (05 Feb 2024 04:50)  POCT Blood Glucose.: 142 mg/dL (04 Feb 2024 17:07)  POCT Blood Glucose.: 179 mg/dL (04 Feb 2024 13:04)    Creatinine Trend: <0.30<--, <0.30<--, <0.30<--, <0.30<--, 0.38<--, <0.30<--  LIVER FUNCTIONS - ( 05 Feb 2024 00:29 )  Alb: 2.5 g/dL / Pro: 4.8 g/dL / ALK PHOS: 76 U/L / ALT: 12 U/L / AST: 16 U/L / GGT: x           PT/INR - ( 05 Feb 2024 00:29 )   PT: 11.2 sec;   INR: 1.07 ratio         PTT - ( 05 Feb 2024 00:29 )  PTT:23.8 sec      Urinalysis Basic - ( 05 Feb 2024 00:29 )    Color: x / Appearance: x / SG: x / pH: x  Gluc: 140 mg/dL / Ketone: x  / Bili: x / Urobili: x   Blood: x / Protein: x / Nitrite: x   Leuk Esterase: x / RBC: x / WBC x   Sq Epi: x / Non Sq Epi: x / Bacteria: x

## 2024-02-05 NOTE — PROGRESS NOTE ADULT - ATTENDING COMMENTS
68F PMH TBI, CVA, nonverbal, bedbound/contracted, sacral decubitus, L breast cancer s/p resection who now presents with failure to thrive and MSSA bacteremia due to sacral ulcer and likely osteomyelitis. Now transferred to MICU for septic shock requiring pressors.    #Neuro: nonverbal at baseline, off sedation, mental status at baseline  #CV: taper off norepinephrine, goal MAP>65. Increase droxidopa to 400 mg q8h. Continue midodrine 10 mg q8h  #PULM: doing well on NC@2LPM, goal SpO2>90%. CTPA without PE. Bronchiectasis  #GI: oropharyngeal dysphagia s/p PEG. Continue tube feeds as tolerates. Bowel regimen  #Renal: stable kidney function and lytes, strict I/O's, close monitoring  #ID: continue wound care for sacral decubitus ulcer/OM. Continue cefazolin, repeat blood cultures negative on 2/1  #Endo: continue Synthroid. Continue insulin coverage scale  #Heme/Onc: breast cancer s/p resection on tamoxifen  #DVT ppx with HSQ  #Dispo: full code, prognosis guarded  CC time spent: 35 min

## 2024-02-05 NOTE — PROGRESS NOTE ADULT - SUBJECTIVE AND OBJECTIVE BOX
Follow Up:  sepsis    Interval History/ROS: pt still on pressors and WBC increased but blood cx cleared    ROS:    Unobtainable because: non verbal      Allergies  Tapazole (Hives)        ANTIMICROBIALS:      OTHER MEDS:  artificial  tears Solution 1 Drop(s) Both EYES two times a day  ascorbic acid 500 milliGRAM(s) Oral daily  chlorhexidine 2% Cloths 1 Application(s) Topical <User Schedule>  collagenase Ointment 1 Application(s) Topical two times a day  Dakins Solution - 1/4 Strength 1 Application(s) Topical every 12 hours  droxidopa 400 milliGRAM(s) Oral every 8 hours  folic acid 1 milliGRAM(s) Oral daily  heparin   Injectable 5000 Unit(s) SubCutaneous every 12 hours  hydrocortisone sodium succinate Injectable 25 milliGRAM(s) IV Push every 12 hours  HYDROmorphone  Injectable 0.2 milliGRAM(s) IV Push every 8 hours PRN  insulin lispro (ADMELOG) corrective regimen sliding scale   SubCutaneous every 6 hours  levothyroxine Injectable 80 MICROGram(s) IV Push at bedtime  midodrine 10 milliGRAM(s) Oral every 8 hours  multivitamin 1 Tablet(s) Oral daily  norepinephrine Infusion 0.05 MICROgram(s)/kG/Min IV Continuous <Continuous>  polyethylene glycol 3350 17 Gram(s) Oral daily  senna 2 Tablet(s) Oral at bedtime  sertraline 25 milliGRAM(s) Oral daily  tamoxifen 20 milliGRAM(s) Oral daily  thiamine 100 milliGRAM(s) Oral daily      Vital Signs Last 24 Hrs  T(C): 37.4 (05 Feb 2024 04:00), Max: 37.6 (04 Feb 2024 20:00)  T(F): 99.3 (05 Feb 2024 04:00), Max: 99.7 (04 Feb 2024 20:00)  HR: 49 (05 Feb 2024 13:15) (49 - 84)  BP: 111/60 (05 Feb 2024 13:00) (82/51 - 175/108)  BP(mean): 80 (05 Feb 2024 13:00) (62 - 135)  RR: 19 (05 Feb 2024 13:15) (18 - 36)  SpO2: 97% (05 Feb 2024 13:15) (84% - 100%)    Parameters below as of 05 Feb 2024 07:30  Patient On (Oxygen Delivery Method): nasal cannula  O2 Flow (L/min): 2      Physical Exam:  General:    cachectic  Respiratory:   comfortable on NC  abd:   soft, PEG with brownish drainage around it, not tender  :   no suprapubic tenderness, + avila  Musculoskeletal : contracted, no joint swelling, no edema  Skin: multiple wounds including sacral with necrotic edge, back with linear eschar  vascular: no phlebitis                          8.8    16.13 )-----------( 215      ( 05 Feb 2024 01:42 )             29.6       02-05    142  |  108  |  14  ----------------------------<  140<H>  4.6   |  23  |  <0.30<L>    Ca    7.1<L>      05 Feb 2024 00:29  Phos  1.8     02-05  Mg     2.2     02-05    TPro  4.8<L>  /  Alb  2.5<L>  /  TBili  0.3  /  DBili  x   /  AST  16  /  ALT  12  /  AlkPhos  76  02-05      Urinalysis Basic - ( 05 Feb 2024 00:29 )    Color: x / Appearance: x / SG: x / pH: x  Gluc: 140 mg/dL / Ketone: x  / Bili: x / Urobili: x   Blood: x / Protein: x / Nitrite: x   Leuk Esterase: x / RBC: x / WBC x   Sq Epi: x / Non Sq Epi: x / Bacteria: x        MICROBIOLOGY:  v  .Genital Vaginal  02-03-24   Normal genital forrest isolated  --  --      .Blood Blood-Peripheral  02-02-24   No growth at 48 Hours  --  --      .Blood Blood-Peripheral  02-02-24   No growth at 48 Hours  --  --      .Blood Blood-Peripheral  02-02-24   No growth at 48 Hours  --  --      .Blood Blood-Peripheral  02-01-24   No growth at 4 days  --  --      Clean Catch Clean Catch (Midstream)  01-30-24   <10,000 CFU/mL Normal Urogenital Forrest  --  --      .Blood Blood-Peripheral  01-30-24   Growth in aerobic and anaerobic bottles: Staphylococcus aureus  Direct identification is available within approximately 3-5  hours either by Blood Panel Multiplexed PCR or Direct  MALDI-TOF. Details: [email or web address]  --  Blood Culture PCR  Staphylococcus aureus      .Blood Blood-Peripheral  01-30-24   No growth at 5 days  --  --      Catheterized Catheterized  01-18-24   <10,000 CFU/mL Normal Urogenital Forrest  --  --                RADIOLOGY:  Images independently visualized and reviewed personally, findings as below  < from: US Abdomen Complete (US Abdomen Complete .) (02.01.24 @ 10:13) >  IMPRESSION:    Hepatic steatosis. No focal hepatic lesion.  Cholelithiasis without evidence of cholecystitis.  1.5 cm echogenic lesion in the posterior right renal cortex may be   minimally increased in size compared to prior ultrasound performed   4/21/2021, and corresponds to enhancing lesion noted on CT. Findings   suggest angiomyolipoma. Correlation with MRI can be performed for further   evaluation.    < end of copied text >  < from: MR Lumbar Spine w/ IV Cont (02.01.24 @ 08:56) >    IMPRESSION:  Prominent sacral decubitus ulcer extending to the coccygeal tip without   evidence of underlying osteomyelitis or discrete fluid collections in the   surrounding soft tissues.    Sinus tract is seen extending from the skin surface to the L3 spinous   process which may represent additional ulcer. Recommend correlation with   clinical aspects of the case. No evidence of osteomyelitis in the   underlying L3 spinous process.    Multilevel chronic compression deformities and lumbar spondylosis as   described.      < end of copied text >  < from: TTE Limited W or WO Ultrasound Enhancing Agent (02.03.24 @ 13:20) >  CONCLUSIONS:      1. Non-diagnostic image quality.   2. Unable to evaluate left ventricular ejection fraction.      < end of copied text >

## 2024-02-05 NOTE — CHART NOTE - NSCHARTNOTEFT_GEN_A_CORE
: Ting Smith     INDICATION:     PROCEDURE:  [ ] LIMITED ECHO  [x ] LIMITED CHEST  [ ] LIMITED RETROPERITONEAL  [ ] LIMITED ABDOMINAL  [ ] LIMITED DVT  [ ] NEEDLE GUIDANCE VASCULAR  [ ] NEEDLE GUIDANCE THORACENTESIS  [ ] NEEDLE GUIDANCE PARACENTESIS  [ ] NEEDLE GUIDANCE PERICARDIOCENTESIS  [ ] OTHER    FINDINGS: Cardiac windows very difficult to achieve.     IVC 2cm with no variability.       INTERPRETATION: IVC 2cm with no variability.

## 2024-02-05 NOTE — PROGRESS NOTE ADULT - ASSESSMENT
68 f with  TBI, SAH, non-verbal at baseline, CVA, sacral ulcer, graves, L breast ca s/p mastectomy on tamoxifen, aspiration s/p PEG, multiple decubiti and sacral osteo, sent from NH for  multiple days of less responsiveness and fever, here febrile to 103.4, tachycardic, hypotensive to 70s presents from [facility] due to multiple days of reduced responsiveness and fever. Found to have leukocytosis, tachycardia and hypotension here as well. Difficult to assess, contracted and non-verbal.  has multiple wound with necrotic edge  WBC: 14, Na: 151, u/a with 17 WBC  CT: no PE, sacral decub to the level of sacrum and ?osteo    fever, tachycardia, hypotension, leukocytosis, transaminitis, hypernatremia septic shock, due to MSSA bacteremia, ? wound related   has multiple wound which have slough but no jim purulent drainage and the MRI did not show osteo or abscess, just a sinus tract extending from L3  first blood cx had only 1/4 MSSA and repeat blood cx negative but pt deteriorated, TTE was not able to evaluate any valves due to contractures  * now that blood cx is clear, would switch to cefepime 2 q 8 and flagyl 500 q 12  * monitor CBC/diff and CMP    The above assessment and plan was discussed with the primary team    Katherin Teixeira MD  contact on teams  After 5pm and on weekends call [phone]

## 2024-02-06 LAB
ALBUMIN SERPL ELPH-MCNC: 2.8 G/DL — LOW (ref 3.3–5)
ALP SERPL-CCNC: 82 U/L — SIGNIFICANT CHANGE UP (ref 40–120)
ALT FLD-CCNC: 11 U/L — SIGNIFICANT CHANGE UP (ref 10–45)
ANION GAP SERPL CALC-SCNC: 11 MMOL/L — SIGNIFICANT CHANGE UP (ref 5–17)
AST SERPL-CCNC: 18 U/L — SIGNIFICANT CHANGE UP (ref 10–40)
BASE EXCESS BLDV CALC-SCNC: 6 MMOL/L — HIGH (ref -2–3)
BASOPHILS # BLD AUTO: 0.01 K/UL — SIGNIFICANT CHANGE UP (ref 0–0.2)
BASOPHILS NFR BLD AUTO: 0.1 % — SIGNIFICANT CHANGE UP (ref 0–2)
BILIRUB SERPL-MCNC: 0.3 MG/DL — SIGNIFICANT CHANGE UP (ref 0.2–1.2)
BUN SERPL-MCNC: 13 MG/DL — SIGNIFICANT CHANGE UP (ref 7–23)
CA-I SERPL-SCNC: 1.14 MMOL/L — LOW (ref 1.15–1.33)
CALCIUM SERPL-MCNC: 7.5 MG/DL — LOW (ref 8.4–10.5)
CHLORIDE BLDV-SCNC: 109 MMOL/L — HIGH (ref 96–108)
CHLORIDE SERPL-SCNC: 110 MMOL/L — HIGH (ref 96–108)
CO2 BLDV-SCNC: 31 MMOL/L — HIGH (ref 22–26)
CO2 SERPL-SCNC: 25 MMOL/L — SIGNIFICANT CHANGE UP (ref 22–31)
CREAT SERPL-MCNC: <0.3 MG/DL — LOW (ref 0.5–1.3)
CULTURE RESULTS: SIGNIFICANT CHANGE UP
CULTURE RESULTS: SIGNIFICANT CHANGE UP
EGFR: 114 ML/MIN/1.73M2 — SIGNIFICANT CHANGE UP
EOSINOPHIL # BLD AUTO: 0.01 K/UL — SIGNIFICANT CHANGE UP (ref 0–0.5)
EOSINOPHIL NFR BLD AUTO: 0.1 % — SIGNIFICANT CHANGE UP (ref 0–6)
GAS PNL BLDV: 142 MMOL/L — SIGNIFICANT CHANGE UP (ref 136–145)
GAS PNL BLDV: SIGNIFICANT CHANGE UP
GAS PNL BLDV: SIGNIFICANT CHANGE UP
GLUCOSE BLDC GLUCOMTR-MCNC: 121 MG/DL — HIGH (ref 70–99)
GLUCOSE BLDC GLUCOMTR-MCNC: 135 MG/DL — HIGH (ref 70–99)
GLUCOSE BLDC GLUCOMTR-MCNC: 154 MG/DL — HIGH (ref 70–99)
GLUCOSE BLDV-MCNC: 182 MG/DL — HIGH (ref 70–99)
GLUCOSE SERPL-MCNC: 188 MG/DL — HIGH (ref 70–99)
HCO3 BLDV-SCNC: 30 MMOL/L — HIGH (ref 22–29)
HCT VFR BLD CALC: 29.4 % — LOW (ref 34.5–45)
HCT VFR BLDA CALC: 27 % — LOW (ref 34.5–46.5)
HGB BLD CALC-MCNC: 9.1 G/DL — LOW (ref 11.7–16.1)
HGB BLD-MCNC: 8.6 G/DL — LOW (ref 11.5–15.5)
HOROWITZ INDEX BLDV+IHG-RTO: 28 — SIGNIFICANT CHANGE UP
IMM GRANULOCYTES NFR BLD AUTO: 0.8 % — SIGNIFICANT CHANGE UP (ref 0–0.9)
LACTATE BLDV-MCNC: 2.8 MMOL/L — HIGH (ref 0.5–2)
LYMPHOCYTES # BLD AUTO: 0.86 K/UL — LOW (ref 1–3.3)
LYMPHOCYTES # BLD AUTO: 6.6 % — LOW (ref 13–44)
MAGNESIUM SERPL-MCNC: 2.3 MG/DL — SIGNIFICANT CHANGE UP (ref 1.6–2.6)
MCHC RBC-ENTMCNC: 26.6 PG — LOW (ref 27–34)
MCHC RBC-ENTMCNC: 29.3 GM/DL — LOW (ref 32–36)
MCV RBC AUTO: 91 FL — SIGNIFICANT CHANGE UP (ref 80–100)
MONOCYTES # BLD AUTO: 0.53 K/UL — SIGNIFICANT CHANGE UP (ref 0–0.9)
MONOCYTES NFR BLD AUTO: 4 % — SIGNIFICANT CHANGE UP (ref 2–14)
NEUTROPHILS # BLD AUTO: 11.59 K/UL — HIGH (ref 1.8–7.4)
NEUTROPHILS NFR BLD AUTO: 88.4 % — HIGH (ref 43–77)
NRBC # BLD: 0 /100 WBCS — SIGNIFICANT CHANGE UP (ref 0–0)
PCO2 BLDV: 38 MMHG — LOW (ref 39–42)
PH BLDV: 7.5 — HIGH (ref 7.32–7.43)
PHOSPHATE SERPL-MCNC: 2.7 MG/DL — SIGNIFICANT CHANGE UP (ref 2.5–4.5)
PLATELET # BLD AUTO: 271 K/UL — SIGNIFICANT CHANGE UP (ref 150–400)
PO2 BLDV: 62 MMHG — HIGH (ref 25–45)
POTASSIUM BLDV-SCNC: 3.7 MMOL/L — SIGNIFICANT CHANGE UP (ref 3.5–5.1)
POTASSIUM SERPL-MCNC: 3.8 MMOL/L — SIGNIFICANT CHANGE UP (ref 3.5–5.3)
POTASSIUM SERPL-SCNC: 3.8 MMOL/L — SIGNIFICANT CHANGE UP (ref 3.5–5.3)
PROT SERPL-MCNC: 5.2 G/DL — LOW (ref 6–8.3)
RBC # BLD: 3.23 M/UL — LOW (ref 3.8–5.2)
RBC # FLD: 17.7 % — HIGH (ref 10.3–14.5)
SAO2 % BLDV: 93.8 % — HIGH (ref 67–88)
SODIUM SERPL-SCNC: 146 MMOL/L — HIGH (ref 135–145)
SPECIMEN SOURCE: SIGNIFICANT CHANGE UP
SPECIMEN SOURCE: SIGNIFICANT CHANGE UP
WBC # BLD: 13.11 K/UL — HIGH (ref 3.8–10.5)
WBC # FLD AUTO: 13.11 K/UL — HIGH (ref 3.8–10.5)

## 2024-02-06 PROCEDURE — 99232 SBSQ HOSP IP/OBS MODERATE 35: CPT

## 2024-02-06 PROCEDURE — 99291 CRITICAL CARE FIRST HOUR: CPT

## 2024-02-06 RX ORDER — NOREPINEPHRINE BITARTRATE/D5W 8 MG/250ML
0.05 PLASTIC BAG, INJECTION (ML) INTRAVENOUS
Qty: 8 | Refills: 0 | Status: DISCONTINUED | OUTPATIENT
Start: 2024-02-06 | End: 2024-02-07

## 2024-02-06 RX ORDER — CEFEPIME 1 G/1
1000 INJECTION, POWDER, FOR SOLUTION INTRAMUSCULAR; INTRAVENOUS EVERY 8 HOURS
Refills: 0 | Status: DISCONTINUED | OUTPATIENT
Start: 2024-02-06 | End: 2024-02-12

## 2024-02-06 RX ADMIN — Medication 500 MILLIGRAM(S): at 11:38

## 2024-02-06 RX ADMIN — Medication 25 MILLIGRAM(S): at 17:58

## 2024-02-06 RX ADMIN — Medication 100 MILLIGRAM(S): at 05:10

## 2024-02-06 RX ADMIN — TAMOXIFEN CITRATE 20 MILLIGRAM(S): 20 TABLET, FILM COATED ORAL at 11:38

## 2024-02-06 RX ADMIN — HYDROMORPHONE HYDROCHLORIDE 0.2 MILLIGRAM(S): 2 INJECTION INTRAMUSCULAR; INTRAVENOUS; SUBCUTANEOUS at 01:27

## 2024-02-06 RX ADMIN — MIDODRINE HYDROCHLORIDE 10 MILLIGRAM(S): 2.5 TABLET ORAL at 06:34

## 2024-02-06 RX ADMIN — Medication 1 APPLICATION(S): at 05:13

## 2024-02-06 RX ADMIN — Medication 100 MILLIGRAM(S): at 11:39

## 2024-02-06 RX ADMIN — Medication 1: at 00:54

## 2024-02-06 RX ADMIN — CEFEPIME 100 MILLIGRAM(S): 1 INJECTION, POWDER, FOR SOLUTION INTRAMUSCULAR; INTRAVENOUS at 05:10

## 2024-02-06 RX ADMIN — CHLORHEXIDINE GLUCONATE 1 APPLICATION(S): 213 SOLUTION TOPICAL at 05:23

## 2024-02-06 RX ADMIN — Medication 100 MILLIGRAM(S): at 17:58

## 2024-02-06 RX ADMIN — CEFEPIME 100 MILLIGRAM(S): 1 INJECTION, POWDER, FOR SOLUTION INTRAMUSCULAR; INTRAVENOUS at 13:25

## 2024-02-06 RX ADMIN — MIDODRINE HYDROCHLORIDE 10 MILLIGRAM(S): 2.5 TABLET ORAL at 13:25

## 2024-02-06 RX ADMIN — Medication 1 MILLIGRAM(S): at 11:37

## 2024-02-06 RX ADMIN — MIDODRINE HYDROCHLORIDE 10 MILLIGRAM(S): 2.5 TABLET ORAL at 21:44

## 2024-02-06 RX ADMIN — SERTRALINE 25 MILLIGRAM(S): 25 TABLET, FILM COATED ORAL at 11:37

## 2024-02-06 RX ADMIN — Medication 1: at 05:24

## 2024-02-06 RX ADMIN — DROXIDOPA 400 MILLIGRAM(S): 100 CAPSULE ORAL at 13:25

## 2024-02-06 RX ADMIN — Medication 1 APPLICATION(S): at 17:59

## 2024-02-06 RX ADMIN — Medication 25 MILLIGRAM(S): at 05:12

## 2024-02-06 RX ADMIN — Medication 1 DROP(S): at 17:58

## 2024-02-06 RX ADMIN — DROXIDOPA 400 MILLIGRAM(S): 100 CAPSULE ORAL at 21:44

## 2024-02-06 RX ADMIN — Medication 1 TABLET(S): at 11:39

## 2024-02-06 RX ADMIN — HEPARIN SODIUM 5000 UNIT(S): 5000 INJECTION INTRAVENOUS; SUBCUTANEOUS at 17:58

## 2024-02-06 RX ADMIN — HEPARIN SODIUM 5000 UNIT(S): 5000 INJECTION INTRAVENOUS; SUBCUTANEOUS at 05:12

## 2024-02-06 RX ADMIN — Medication 3.4 MICROGRAM(S)/KG/MIN: at 11:37

## 2024-02-06 RX ADMIN — CEFEPIME 100 MILLIGRAM(S): 1 INJECTION, POWDER, FOR SOLUTION INTRAMUSCULAR; INTRAVENOUS at 21:44

## 2024-02-06 RX ADMIN — DROXIDOPA 400 MILLIGRAM(S): 100 CAPSULE ORAL at 05:23

## 2024-02-06 RX ADMIN — SENNA PLUS 2 TABLET(S): 8.6 TABLET ORAL at 21:44

## 2024-02-06 RX ADMIN — Medication 80 MICROGRAM(S): at 21:44

## 2024-02-06 RX ADMIN — HYDROMORPHONE HYDROCHLORIDE 0.2 MILLIGRAM(S): 2 INJECTION INTRAMUSCULAR; INTRAVENOUS; SUBCUTANEOUS at 11:37

## 2024-02-06 RX ADMIN — Medication 1 DROP(S): at 05:12

## 2024-02-06 RX ADMIN — HYDROMORPHONE HYDROCHLORIDE 0.2 MILLIGRAM(S): 2 INJECTION INTRAMUSCULAR; INTRAVENOUS; SUBCUTANEOUS at 01:50

## 2024-02-06 NOTE — PROGRESS NOTE ADULT - SUBJECTIVE AND OBJECTIVE BOX
Gaby Daniel, PGY-1  ICU Progress Note    Patient is a 68y old  Female who presents with a chief complaint of Sepsis 2/2 osteomyelitis (05 Feb 2024 16:02)      SUBJECTIVE / OVERNIGHT EVENTS:    MEDICATIONS  (STANDING):  artificial  tears Solution 1 Drop(s) Both EYES two times a day  ascorbic acid 500 milliGRAM(s) Oral daily  cefepime   IVPB      cefepime   IVPB 2000 milliGRAM(s) IV Intermittent every 8 hours  chlorhexidine 2% Cloths 1 Application(s) Topical <User Schedule>  collagenase Ointment 1 Application(s) Topical two times a day  Dakins Solution - 1/4 Strength 1 Application(s) Topical every 12 hours  droxidopa 400 milliGRAM(s) Oral every 8 hours  folic acid 1 milliGRAM(s) Oral daily  heparin   Injectable 5000 Unit(s) SubCutaneous every 12 hours  hydrocortisone sodium succinate Injectable 25 milliGRAM(s) IV Push every 12 hours  insulin lispro (ADMELOG) corrective regimen sliding scale   SubCutaneous every 6 hours  levothyroxine Injectable 80 MICROGram(s) IV Push at bedtime  metroNIDAZOLE  IVPB 500 milliGRAM(s) IV Intermittent every 12 hours  midodrine 10 milliGRAM(s) Oral every 8 hours  multivitamin 1 Tablet(s) Oral daily  norepinephrine Infusion 0.05 MICROgram(s)/kG/Min (3.4 mL/Hr) IV Continuous <Continuous>  polyethylene glycol 3350 17 Gram(s) Oral daily  senna 2 Tablet(s) Oral at bedtime  sertraline 25 milliGRAM(s) Oral daily  tamoxifen 20 milliGRAM(s) Oral daily  thiamine 100 milliGRAM(s) Oral daily    MEDICATIONS  (PRN):  HYDROmorphone  Injectable 0.2 milliGRAM(s) IV Push every 8 hours PRN dressing change    Allergies    Tapazole (Hives)    Intolerances        Vital Signs Last 24 Hrs  T(C): 36.3 (06 Feb 2024 04:00), Max: 37.3 (05 Feb 2024 20:00)  T(F): 97.4 (06 Feb 2024 04:00), Max: 99.2 (05 Feb 2024 20:00)  HR: 70 (06 Feb 2024 07:00) (47 - 85)  BP: 106/64 (06 Feb 2024 07:00) (71/46 - 131/80)  BP(mean): 80 (06 Feb 2024 07:00) (54 - 101)  RR: 19 (06 Feb 2024 07:00) (11 - 36)  SpO2: 99% (06 Feb 2024 07:00) (91% - 100%)    Parameters below as of 05 Feb 2024 20:00  Patient On (Oxygen Delivery Method): nasal cannula  O2 Flow (L/min): 2    Daily     Daily   I&O's Summary    05 Feb 2024 07:01  -  06 Feb 2024 07:00  --------------------------------------------------------  IN: 1931.3 mL / OUT: 1801 mL / NET: 130.3 mL    Physical Exam  General: A&Ox0, NAD, more awake, eyes open  and squeezes hands to command  HEENT: mucous membranes slightly dry  Neck: supple  Chest/Lungs: CTAB, no WRR  Heart: RRR, no murmurs. No cyanosis  Abdomen: soft, nontender, nondistended   Extremities: contracted c/w baseline, no edema, palpable pulses  Skin: multiple pressure injuries to sacrum/right hip/buttocks, left shoulder/hip, lumbar spine, L foot   Neuro: awake, nonverbal (c/w baseline) but opens eyes and squeezes hands to command    DIAGNOSTICS:                         8.6    13.11 )-----------( 271      ( 06 Feb 2024 00:10 )             29.4     Hgb Trend: 8.6<--, 8.8<--, 8.8<--, 8.0<--, 7.5<--  02-06    146<H>  |  110<H>  |  13  ----------------------------<  188<H>  3.8   |  25  |  <0.30<L>    Ca    7.5<L>      06 Feb 2024 00:17  Phos  2.7     02-06  Mg     2.3     02-06    TPro  5.2<L>  /  Alb  2.8<L>  /  TBili  0.3  /  DBili  x   /  AST  18  /  ALT  11  /  AlkPhos  82  02-06    CAPILLARY BLOOD GLUCOSE      POCT Blood Glucose.: 154 mg/dL (06 Feb 2024 05:20)  POCT Blood Glucose.: 148 mg/dL (05 Feb 2024 18:00)  POCT Blood Glucose.: 135 mg/dL (05 Feb 2024 12:34)    Creatinine Trend: <0.30<--, <0.30<--, <0.30<--, <0.30<--, <0.30<--, 0.38<--  LIVER FUNCTIONS - ( 06 Feb 2024 00:17 )  Alb: 2.8 g/dL / Pro: 5.2 g/dL / ALK PHOS: 82 U/L / ALT: 11 U/L / AST: 18 U/L / GGT: x           PT/INR - ( 05 Feb 2024 00:29 )   PT: 11.2 sec;   INR: 1.07 ratio         PTT - ( 05 Feb 2024 00:29 )  PTT:23.8 sec      Urinalysis Basic - ( 06 Feb 2024 00:17 )    Color: x / Appearance: x / SG: x / pH: x  Gluc: 188 mg/dL / Ketone: x  / Bili: x / Urobili: x   Blood: x / Protein: x / Nitrite: x   Leuk Esterase: x / RBC: x / WBC x   Sq Epi: x / Non Sq Epi: x / Bacteria: x     Gaby Daniel, PGY-1  ICU Progress Note    Patient is a 68y old  Female who presents with a chief complaint of Sepsis 2/2 osteomyelitis (05 Feb 2024 16:02)    SUBJECTIVE / OVERNIGHT EVENTS:  - broadened abx to cefepime/flagyll  - on levo, pressor requirements decreasing  - hypernatremic, started free water 200cc q6h    MEDICATIONS  (STANDING):  artificial  tears Solution 1 Drop(s) Both EYES two times a day  ascorbic acid 500 milliGRAM(s) Oral daily  cefepime   IVPB      cefepime   IVPB 2000 milliGRAM(s) IV Intermittent every 8 hours  chlorhexidine 2% Cloths 1 Application(s) Topical <User Schedule>  collagenase Ointment 1 Application(s) Topical two times a day  Dakins Solution - 1/4 Strength 1 Application(s) Topical every 12 hours  droxidopa 400 milliGRAM(s) Oral every 8 hours  folic acid 1 milliGRAM(s) Oral daily  heparin   Injectable 5000 Unit(s) SubCutaneous every 12 hours  hydrocortisone sodium succinate Injectable 25 milliGRAM(s) IV Push every 12 hours  insulin lispro (ADMELOG) corrective regimen sliding scale   SubCutaneous every 6 hours  levothyroxine Injectable 80 MICROGram(s) IV Push at bedtime  metroNIDAZOLE  IVPB 500 milliGRAM(s) IV Intermittent every 12 hours  midodrine 10 milliGRAM(s) Oral every 8 hours  multivitamin 1 Tablet(s) Oral daily  norepinephrine Infusion 0.05 MICROgram(s)/kG/Min (3.4 mL/Hr) IV Continuous <Continuous>  polyethylene glycol 3350 17 Gram(s) Oral daily  senna 2 Tablet(s) Oral at bedtime  sertraline 25 milliGRAM(s) Oral daily  tamoxifen 20 milliGRAM(s) Oral daily  thiamine 100 milliGRAM(s) Oral daily    MEDICATIONS  (PRN):  HYDROmorphone  Injectable 0.2 milliGRAM(s) IV Push every 8 hours PRN dressing change    Allergies    Tapazole (Hives)    Intolerances        Vital Signs Last 24 Hrs  T(C): 36.3 (06 Feb 2024 04:00), Max: 37.3 (05 Feb 2024 20:00)  T(F): 97.4 (06 Feb 2024 04:00), Max: 99.2 (05 Feb 2024 20:00)  HR: 70 (06 Feb 2024 07:00) (47 - 85)  BP: 106/64 (06 Feb 2024 07:00) (71/46 - 131/80)  BP(mean): 80 (06 Feb 2024 07:00) (54 - 101)  RR: 19 (06 Feb 2024 07:00) (11 - 36)  SpO2: 99% (06 Feb 2024 07:00) (91% - 100%)    Parameters below as of 05 Feb 2024 20:00  Patient On (Oxygen Delivery Method): nasal cannula  O2 Flow (L/min): 2    Daily     Daily   I&O's Summary    05 Feb 2024 07:01  -  06 Feb 2024 07:00  --------------------------------------------------------  IN: 1931.3 mL / OUT: 1801 mL / NET: 130.3 mL    Physical Exam  General: A&Ox0, NAD, opens eyes to voice and squeezes hands to command  HEENT: mucous membranes slightly dry  Neck: supple  Chest/Lungs: CTAB, no WRR  Heart: RRR, no murmurs. No cyanosis  Abdomen: soft, nontender, nondistended   Extremities: contracted c/w baseline, no edema, palpable pulses  Skin: multiple pressure injuries to sacrum/right hip/buttocks, left shoulder/hip, lumbar spine, L foot   Neuro: awake, nonverbal (c/w baseline) but opens eyes and squeezes hands to command    DIAGNOSTICS:                         8.6    13.11 )-----------( 271      ( 06 Feb 2024 00:10 )             29.4     Hgb Trend: 8.6<--, 8.8<--, 8.8<--, 8.0<--, 7.5<--  02-06    146<H>  |  110<H>  |  13  ----------------------------<  188<H>  3.8   |  25  |  <0.30<L>    Ca    7.5<L>      06 Feb 2024 00:17  Phos  2.7     02-06  Mg     2.3     02-06    TPro  5.2<L>  /  Alb  2.8<L>  /  TBili  0.3  /  DBili  x   /  AST  18  /  ALT  11  /  AlkPhos  82  02-06    CAPILLARY BLOOD GLUCOSE      POCT Blood Glucose.: 154 mg/dL (06 Feb 2024 05:20)  POCT Blood Glucose.: 148 mg/dL (05 Feb 2024 18:00)  POCT Blood Glucose.: 135 mg/dL (05 Feb 2024 12:34)    Creatinine Trend: <0.30<--, <0.30<--, <0.30<--, <0.30<--, <0.30<--, 0.38<--  LIVER FUNCTIONS - ( 06 Feb 2024 00:17 )  Alb: 2.8 g/dL / Pro: 5.2 g/dL / ALK PHOS: 82 U/L / ALT: 11 U/L / AST: 18 U/L / GGT: x           PT/INR - ( 05 Feb 2024 00:29 )   PT: 11.2 sec;   INR: 1.07 ratio         PTT - ( 05 Feb 2024 00:29 )  PTT:23.8 sec      Urinalysis Basic - ( 06 Feb 2024 00:17 )    Color: x / Appearance: x / SG: x / pH: x  Gluc: 188 mg/dL / Ketone: x  / Bili: x / Urobili: x   Blood: x / Protein: x / Nitrite: x   Leuk Esterase: x / RBC: x / WBC x   Sq Epi: x / Non Sq Epi: x / Bacteria: x

## 2024-02-06 NOTE — PROGRESS NOTE ADULT - SUBJECTIVE AND OBJECTIVE BOX
Follow Up:  sepsis    Interval History/ROS: pt afebrile, improved pressor requirements, WBC down to 13    ROS:    Unobtainable because: non verbal      Allergies  Tapazole (Hives)        ANTIMICROBIALS:  cefepime   IVPB 1000 every 8 hours  metroNIDAZOLE  IVPB 500 every 12 hours      OTHER MEDS:  artificial  tears Solution 1 Drop(s) Both EYES two times a day  ascorbic acid 500 milliGRAM(s) Oral daily  chlorhexidine 2% Cloths 1 Application(s) Topical <User Schedule>  collagenase Ointment 1 Application(s) Topical two times a day  Dakins Solution - 1/4 Strength 1 Application(s) Topical every 12 hours  droxidopa 400 milliGRAM(s) Oral every 8 hours  folic acid 1 milliGRAM(s) Oral daily  heparin   Injectable 5000 Unit(s) SubCutaneous every 12 hours  hydrocortisone sodium succinate Injectable 25 milliGRAM(s) IV Push every 12 hours  HYDROmorphone  Injectable 0.2 milliGRAM(s) IV Push every 8 hours PRN  insulin lispro (ADMELOG) corrective regimen sliding scale   SubCutaneous every 6 hours  levothyroxine Injectable 80 MICROGram(s) IV Push at bedtime  midodrine 10 milliGRAM(s) Oral every 8 hours  multivitamin 1 Tablet(s) Oral daily  norepinephrine Infusion 0.05 MICROgram(s)/kG/Min IV Continuous <Continuous>  polyethylene glycol 3350 17 Gram(s) Oral daily  senna 2 Tablet(s) Oral at bedtime  sertraline 25 milliGRAM(s) Oral daily  tamoxifen 20 milliGRAM(s) Oral daily  thiamine 100 milliGRAM(s) Oral daily      Vital Signs Last 24 Hrs  T(C): 36.3 (06 Feb 2024 12:00), Max: 37.3 (05 Feb 2024 20:00)  T(F): 97.4 (06 Feb 2024 12:00), Max: 99.2 (05 Feb 2024 20:00)  HR: 72 (06 Feb 2024 14:45) (51 - 85)  BP: 99/60 (06 Feb 2024 14:45) (71/46 - 131/80)  BP(mean): 74 (06 Feb 2024 14:45) (54 - 101)  RR: 37 (06 Feb 2024 14:45) (10 - 37)  SpO2: 99% (06 Feb 2024 14:45) (91% - 100%)    Parameters below as of 06 Feb 2024 07:45  Patient On (Oxygen Delivery Method): nasal cannula  O2 Flow (L/min): 2      Physical Exam:  General:    cachectic  Respiratory:   comfortable on NC  abd:   soft, PEG with brownish drainage around it, not tender  :   no avila  Musculoskeletal : contracted, no joint swelling, no edema  Skin: multiple wounds including sacral with necrotic edge, back with linear eschar  vascular: no phlebitis                          8.6    13.11 )-----------( 271      ( 06 Feb 2024 00:10 )             29.4       02-06    146<H>  |  110<H>  |  13  ----------------------------<  188<H>  3.8   |  25  |  <0.30<L>    Ca    7.5<L>      06 Feb 2024 00:17  Phos  2.7     02-06  Mg     2.3     02-06    TPro  5.2<L>  /  Alb  2.8<L>  /  TBili  0.3  /  DBili  x   /  AST  18  /  ALT  11  /  AlkPhos  82  02-06      Urinalysis Basic - ( 06 Feb 2024 00:17 )    Color: x / Appearance: x / SG: x / pH: x  Gluc: 188 mg/dL / Ketone: x  / Bili: x / Urobili: x   Blood: x / Protein: x / Nitrite: x   Leuk Esterase: x / RBC: x / WBC x   Sq Epi: x / Non Sq Epi: x / Bacteria: x        MICROBIOLOGY:  v  .Genital Vaginal  02-03-24   Normal genital forrest isolated  --  --      .Blood Blood-Peripheral  02-02-24   No growth at 72 Hours  --  --      .Blood Blood-Peripheral  02-02-24   No growth at 72 Hours  --  --      .Blood Blood-Peripheral  02-02-24   No growth at 72 Hours  --  --      .Blood Blood-Peripheral  02-01-24   No growth at 5 days  --  --      Clean Catch Clean Catch (Midstream)  01-30-24   <10,000 CFU/mL Normal Urogenital Forrest  --  --      .Blood Blood-Peripheral  01-30-24   Growth in aerobic and anaerobic bottles: Staphylococcus aureus  Direct identification is available within approximately 3-5  hours either by Blood Panel Multiplexed PCR or Direct  MALDI-TOF. Details: [email or web address]  --  Blood Culture PCR  Staphylococcus aureus      .Blood Blood-Peripheral  01-30-24   No growth at 5 days  --  --      Catheterized Catheterized  01-18-24   <10,000 CFU/mL Normal Urogenital Forrest  --  --                RADIOLOGY:  Images independently visualized and reviewed personally, findings as below  < from: US Abdomen Complete (US Abdomen Complete .) (02.01.24 @ 10:13) >  IMPRESSION:    Hepatic steatosis. No focal hepatic lesion.  Cholelithiasis without evidence of cholecystitis.  1.5 cm echogenic lesion in the posterior right renal cortex may be   minimally increased in size compared to prior ultrasound performed   4/21/2021, and corresponds to enhancing lesion noted on CT. Findings   suggest angiomyolipoma. Correlation with MRI can be performed for further   evaluation.    < end of copied text >  < from: MR Lumbar Spine w/ IV Cont (02.01.24 @ 08:56) >  IMPRESSION:  Prominent sacral decubitus ulcer extending to the coccygeal tip without   evidence of underlying osteomyelitis or discrete fluid collections in the   surrounding soft tissues.    Sinus tract is seen extending from the skin surface to the L3 spinous   process which may represent additional ulcer. Recommend correlation with   clinical aspects of the case. No evidence of osteomyelitis in the   underlying L3 spinous process.    Multilevel chronic compression deformities and lumbar spondylosis as   described.    < end of copied text >  < from: TTE Limited W or WO Ultrasound Enhancing Agent (02.03.24 @ 13:20) >  CONCLUSIONS:      1. Non-diagnostic image quality.   2. Unable to evaluate left ventricular ejection fraction.      < end of copied text >

## 2024-02-06 NOTE — PROGRESS NOTE ADULT - ASSESSMENT
67 yo F with hx TBI (nonverbal, bedbound/contracted at baseline) s/p PEG last admission due to recurrent hypernatremia and chronic malnutrition, CVA, hypothyroidism, L breast cancer (s/p mastectomy tissue expander still in place ) who presented for decreased responsiveness and hypotension, found to have sepsis with MSSA bacteremia, likely source sacral ulcer w/ c/f possible osteomyelitis. Now being transferred to MICU for hypotension 2/2 likely septic shock, requiring pressors.     ======== Neuro =================  - AOx0, nonverbal c/w baseline mental status    ========Cardiovascular============  # shock, likely septic ISO sacral decub ulcer  - home med: midodrine 5 mg q8h  - d/c'd midodrine due to bradycardia, now restarted at 10 mg q8h as HRs normalized   - droxidopa, increase to 400 mg q8h  - still on levophed, pressor requirements decreasing  - sepsis workup/tx as outlined in ID plan  - on hydrocortisone 25 mg q12h - not on chronic steroids. Taper hydrocortisone to 25 mg q12h    #QT prolongation  - QTc 561, 470s on repeat  - K>4, Mg>2  - avoid QT prolonging agents    ==========Respiratory===============  - basal consolidation seen on POCUS, aspiration PNA vs atelectasis  - on empiric cefazolin  - on 2L NC, SpO2 appropriate  - CTA negative for PE  - wean O2 as tolerated   - MRSA swab negative      ===========GI/nutrition============  > s/p PEG  - diet NPO w/ TFs    > constipation, chronic  - cont miralax, senna    ===========RENAL/==============  - renal function at baseline, monitor for JAVIER ISO hemodynamic instability  - strict I/Os  # avila  - no avila at baseline  - TOV     ========== SKIN =================  - extensive lumbar/sacral/gluteal unstageable pressure wounds   - MRI spine with prominent sacral decubitus ulcer extending to the coccygeal tip without evidence of underlying osteomyelitis or discrete fluid collections in the surrounding soft tissues  - blood cultures growing MSSA 1/31, subsequent cultures NGTD   - wound care following, appreciate recs  - broadened abx from cefazolin to cefepime/flagyll per ID recs   - f/u wound cultures    =========== ID ==============  > sepsis  > MSSA bacteremia  > sacral wound, likely source of sepsis/bacteremia  - WBC 15k, hypotensive 2/2 likely septic shock- initially has been downtrending to 11k now up to 14K on 2/4  - ABG lactate 2.0  - broadened abx from cefazolin to cefepime/flagyll per ID recs 2/5  - repeat blood cultures negative, clear as of 1/1   - unable to undergo TTE due to contractures   - ID following, f/u recs     ========== ENDOCRINE ===========  > hypothyroidism  - continue levothyroxine     ==========Heme/onc/DVT========  # hx breast cancer s/p mastectomy  - continue tamoxifen    # DVT ppx  - heparin SQ     #Ethics  - code status: FULL    69 yo F with hx TBI (nonverbal, bedbound/contracted at baseline) s/p PEG last admission due to recurrent hypernatremia and chronic malnutrition, CVA, hypothyroidism, L breast cancer (s/p mastectomy tissue expander still in place ) who presented for decreased responsiveness and hypotension, found to have sepsis with MSSA bacteremia, likely source sacral ulcer w/ c/f possible osteomyelitis. Now being transferred to MICU for hypotension 2/2 likely septic shock, requiring pressors.     ======== Neuro =================  - AOx0, nonverbal c/w baseline mental status  - home sertraline 25 mg QD    ========Cardiovascular============  # shock, likely septic ISO sacral decub ulcer  - home med: midodrine 5 mg q8h  - d/c'd midodrine due to bradycardia, now restarted at 10 mg q8h as HRs normalized   - droxidopa 400 mg q8h  - still on levo, pressor requirements decreasing  - MAP goal >60  - sepsis workup/tx as outlined in ID plan  - hydrocortisone tapered to 25 mg q12h     #QT prolongation  - QTc 561, 470s on repeat  - K>4, Mg>2  - avoid QT prolonging agents    ==========Respiratory===============  - basal consolidation seen on POCUS, aspiration PNA vs atelectasis  - on empiric cefazolin  - on 2L NC, SpO2 appropriate  - CTA negative for PE  - wean O2 as tolerated   - MRSA swab negative      ===========GI/nutrition============  > s/p PEG  - diet NPO w/ TFs    > constipation, chronic  - cont miralax, senna    ===========RENAL/==============  - renal function at baseline, monitor for JAVIER ISO hemodynamic instability  - strict I/Os  - passed TOV    #hypernatremia  - free water 200 cc q6h   - trend, adjust free water as needed     ========== SKIN =================  - extensive lumbar/sacral/gluteal unstageable pressure wounds   - MRI spine with prominent sacral decubitus ulcer extending to the coccygeal tip without evidence of underlying osteomyelitis or discrete fluid collections in the surrounding soft tissues  - blood cultures growing MSSA 1/31, subsequent cultures NGTD   - wound care following, appreciate recs  - broadened abx from cefazolin to cefepime/flagyll per ID recs   - f/u wound cultures    =========== ID ==============  > sepsis  > MSSA bacteremia  > sacral wound, likely source of sepsis/bacteremia  - WBC 15k, hypotensive 2/2 likely septic shock- initially has been downtrending to 11k now up to 14K on 2/4  - ABG lactate 2.0  - broadened abx from cefazolin to cefepime/flagyll per ID recs 2/5  - repeat blood cultures negative, clear as of 1/1   - unable to undergo TTE due to contractures   - ID following, f/u recs     ========== ENDOCRINE ===========  > hypothyroidism  - continue levothyroxine     ==========Heme/onc/DVT========  # hx breast cancer s/p mastectomy  - continue tamoxifen    # DVT ppx  - heparin SQ     #Ethics  - code status: FULL

## 2024-02-06 NOTE — PROGRESS NOTE ADULT - ASSESSMENT
68 f with  TBI, SAH, non-verbal at baseline, CVA, sacral ulcer, graves, L breast ca s/p mastectomy on tamoxifen, aspiration s/p PEG, multiple decubiti and sacral osteo, sent from NH for  multiple days of less responsiveness and fever, here febrile to 103.4, tachycardic, hypotensive to 70s presents from [facility] [place] [facility] due to multiple days of reduced responsiveness and fever. Found to have leukocytosis, tachycardia and hypotension here as well. Difficult to assess, contracted and non-verbal.  has multiple wound with necrotic edge  WBC: 14, Na: 151, u/a with 17 WBC  CT: no PE, sacral decub to the level of sacrum and ?osteo    fever, tachycardia, hypotension, leukocytosis, transaminitis, hypernatremia septic shock, due to MSSA bacteremia, ? wound related   has multiple wound which have slough but no jim purulent drainage and the MRI did not show osteo or abscess, just a sinus tract extending from L3  first blood cx had only 1/4 MSSA and repeat blood cx negative but pt deteriorated, TTE was not able to evaluate any valves due to contractures  * switch to cefepime 2 q 8 and flagyl 500 q 12 2/5 (when pt deteriorated and blood cx cleared)  * will likely do a 7-10 days of cefepime, flagyl and then changes to cefazolin complete a total 4 week course post negative blood cx through 2/29  * monitor CBC/diff and CMP  * continue wound care    The above assessment and plan was discussed with SALINAS Teixeira MD  contact on teams  After 5pm and on weekends call [phone]

## 2024-02-06 NOTE — PROGRESS NOTE ADULT - ATTENDING COMMENTS
68F PMH TBI, CVA, nonverbal, bedbound/contracted, sacral decubitus, L breast cancer s/p resection who now presents with failure to thrive and MSSA bacteremia due to sacral ulcer and likely osteomyelitis. Now transferred to MICU for septic shock requiring pressors.    #Neuro: nonverbal at baseline, off sedation, mental status at baseline  #CV: taper off norepinephrine, goal MAP>60. Continue droxidopa 400 mg q8h + midodrine 10 mg q8h  #PULM: doing well on NC@2LPM, goal SpO2>90%. CTPA without PE. Bronchiectasis  #GI: oropharyngeal dysphagia s/p PEG. Continue tube feeds as tolerates. Bowel regimen  #Renal: stable kidney function and lytes, strict I/O's, close monitoring  #ID: continue wound care for sacral decubitus ulcer/OM. Continue cefepime and metronidazole per ID. Repeat blood cultures negative on 2/1  #Endo: continue Synthroid. Continue insulin coverage scale  #Heme/Onc: breast cancer s/p resection on tamoxifen  #DVT ppx with HSQ  #Dispo: full code, prognosis guarded, discussed with kristen Goodman at bedside  CC time spent: 35 min

## 2024-02-06 NOTE — PHARMACOTHERAPY INTERVENTION NOTE - COMMENTS
JONATHAN YOUNGER, 68y Female     Creatinine: <0.30 mg/dL (02-06-24 @ 00:17)  Creatinine: <0.30 mg/dL (02-05-24 @ 00:29)  Creatinine: <0.30 mg/dL (02-03-24 @ 23:47)      Blood cx grew MSSA 1/4 bottle  Concerns for multiple wound  Antibiotic was switched from cefazolin to cefepime 2g IV q8h and metronidazole 500mg IV q12h    Recommendation(s):  1) Adjust cefepime to 1g IV q8h (weight 36kg)  2) Will f/u patient renal function and UOP    Jose,  Daija Terrell, PharmD, BCCCP  Clinical Pharmacist, Critical Care  Available via [employer] Teams

## 2024-02-07 LAB
ALBUMIN SERPL ELPH-MCNC: 2.9 G/DL — LOW (ref 3.3–5)
ALP SERPL-CCNC: 82 U/L — SIGNIFICANT CHANGE UP (ref 40–120)
ALT FLD-CCNC: 10 U/L — SIGNIFICANT CHANGE UP (ref 10–45)
ANION GAP SERPL CALC-SCNC: 10 MMOL/L — SIGNIFICANT CHANGE UP (ref 5–17)
AST SERPL-CCNC: 13 U/L — SIGNIFICANT CHANGE UP (ref 10–40)
BASE EXCESS BLDV CALC-SCNC: 6.7 MMOL/L — HIGH (ref -2–3)
BASOPHILS # BLD AUTO: 0.01 K/UL — SIGNIFICANT CHANGE UP (ref 0–0.2)
BASOPHILS NFR BLD AUTO: 0.1 % — SIGNIFICANT CHANGE UP (ref 0–2)
BILIRUB SERPL-MCNC: 0.4 MG/DL — SIGNIFICANT CHANGE UP (ref 0.2–1.2)
BUN SERPL-MCNC: 17 MG/DL — SIGNIFICANT CHANGE UP (ref 7–23)
CA-I SERPL-SCNC: 1.18 MMOL/L — SIGNIFICANT CHANGE UP (ref 1.15–1.33)
CALCIUM SERPL-MCNC: 8.1 MG/DL — LOW (ref 8.4–10.5)
CHLORIDE BLDV-SCNC: 106 MMOL/L — SIGNIFICANT CHANGE UP (ref 96–108)
CHLORIDE SERPL-SCNC: 106 MMOL/L — SIGNIFICANT CHANGE UP (ref 96–108)
CO2 BLDV-SCNC: 32 MMOL/L — HIGH (ref 22–26)
CO2 SERPL-SCNC: 26 MMOL/L — SIGNIFICANT CHANGE UP (ref 22–31)
CREAT SERPL-MCNC: <0.3 MG/DL — LOW (ref 0.5–1.3)
CULTURE RESULTS: SIGNIFICANT CHANGE UP
EGFR: 114 ML/MIN/1.73M2 — SIGNIFICANT CHANGE UP
EOSINOPHIL # BLD AUTO: 0 K/UL — SIGNIFICANT CHANGE UP (ref 0–0.5)
EOSINOPHIL NFR BLD AUTO: 0 % — SIGNIFICANT CHANGE UP (ref 0–6)
GAS PNL BLDV: 140 MMOL/L — SIGNIFICANT CHANGE UP (ref 136–145)
GAS PNL BLDV: SIGNIFICANT CHANGE UP
GLUCOSE BLDC GLUCOMTR-MCNC: 120 MG/DL — HIGH (ref 70–99)
GLUCOSE BLDC GLUCOMTR-MCNC: 145 MG/DL — HIGH (ref 70–99)
GLUCOSE BLDC GLUCOMTR-MCNC: 87 MG/DL — SIGNIFICANT CHANGE UP (ref 70–99)
GLUCOSE BLDV-MCNC: 170 MG/DL — HIGH (ref 70–99)
GLUCOSE SERPL-MCNC: 161 MG/DL — HIGH (ref 70–99)
HCO3 BLDV-SCNC: 30 MMOL/L — HIGH (ref 22–29)
HCT VFR BLD CALC: 30.3 % — LOW (ref 34.5–45)
HCT VFR BLDA CALC: 27 % — LOW (ref 34.5–46.5)
HGB BLD CALC-MCNC: 9 G/DL — LOW (ref 11.7–16.1)
HGB BLD-MCNC: 8.7 G/DL — LOW (ref 11.5–15.5)
HOROWITZ INDEX BLDV+IHG-RTO: 21 — SIGNIFICANT CHANGE UP
IMM GRANULOCYTES NFR BLD AUTO: 0.5 % — SIGNIFICANT CHANGE UP (ref 0–0.9)
LACTATE BLDV-MCNC: 2.4 MMOL/L — HIGH (ref 0.5–2)
LYMPHOCYTES # BLD AUTO: 0.76 K/UL — LOW (ref 1–3.3)
LYMPHOCYTES # BLD AUTO: 8.1 % — LOW (ref 13–44)
MAGNESIUM SERPL-MCNC: 2.3 MG/DL — SIGNIFICANT CHANGE UP (ref 1.6–2.6)
MCHC RBC-ENTMCNC: 26.6 PG — LOW (ref 27–34)
MCHC RBC-ENTMCNC: 28.7 GM/DL — LOW (ref 32–36)
MCV RBC AUTO: 92.7 FL — SIGNIFICANT CHANGE UP (ref 80–100)
MONOCYTES # BLD AUTO: 0.42 K/UL — SIGNIFICANT CHANGE UP (ref 0–0.9)
MONOCYTES NFR BLD AUTO: 4.5 % — SIGNIFICANT CHANGE UP (ref 2–14)
NEUTROPHILS # BLD AUTO: 8.09 K/UL — HIGH (ref 1.8–7.4)
NEUTROPHILS NFR BLD AUTO: 86.8 % — HIGH (ref 43–77)
NRBC # BLD: 0 /100 WBCS — SIGNIFICANT CHANGE UP (ref 0–0)
PCO2 BLDV: 39 MMHG — SIGNIFICANT CHANGE UP (ref 39–42)
PH BLDV: 7.5 — HIGH (ref 7.32–7.43)
PHOSPHATE SERPL-MCNC: 2.1 MG/DL — LOW (ref 2.5–4.5)
PLATELET # BLD AUTO: 248 K/UL — SIGNIFICANT CHANGE UP (ref 150–400)
PO2 BLDV: 49 MMHG — HIGH (ref 25–45)
POTASSIUM BLDV-SCNC: 3.9 MMOL/L — SIGNIFICANT CHANGE UP (ref 3.5–5.1)
POTASSIUM SERPL-MCNC: 3.8 MMOL/L — SIGNIFICANT CHANGE UP (ref 3.5–5.3)
POTASSIUM SERPL-SCNC: 3.8 MMOL/L — SIGNIFICANT CHANGE UP (ref 3.5–5.3)
PROT SERPL-MCNC: 5.4 G/DL — LOW (ref 6–8.3)
RBC # BLD: 3.27 M/UL — LOW (ref 3.8–5.2)
RBC # FLD: 18.1 % — HIGH (ref 10.3–14.5)
SAO2 % BLDV: 81.8 % — SIGNIFICANT CHANGE UP (ref 67–88)
SODIUM SERPL-SCNC: 142 MMOL/L — SIGNIFICANT CHANGE UP (ref 135–145)
SPECIMEN SOURCE: SIGNIFICANT CHANGE UP
WBC # BLD: 9.33 K/UL — SIGNIFICANT CHANGE UP (ref 3.8–10.5)
WBC # FLD AUTO: 9.33 K/UL — SIGNIFICANT CHANGE UP (ref 3.8–10.5)

## 2024-02-07 PROCEDURE — 99233 SBSQ HOSP IP/OBS HIGH 50: CPT

## 2024-02-07 PROCEDURE — 99233 SBSQ HOSP IP/OBS HIGH 50: CPT | Mod: GC

## 2024-02-07 PROCEDURE — 99232 SBSQ HOSP IP/OBS MODERATE 35: CPT

## 2024-02-07 RX ORDER — DROXIDOPA 100 MG/1
300 CAPSULE ORAL EVERY 8 HOURS
Refills: 0 | Status: DISCONTINUED | OUTPATIENT
Start: 2024-02-07 | End: 2024-02-20

## 2024-02-07 RX ORDER — POTASSIUM PHOSPHATE, MONOBASIC POTASSIUM PHOSPHATE, DIBASIC 236; 224 MG/ML; MG/ML
15 INJECTION, SOLUTION INTRAVENOUS ONCE
Refills: 0 | Status: COMPLETED | OUTPATIENT
Start: 2024-02-07 | End: 2024-02-07

## 2024-02-07 RX ADMIN — Medication 1 APPLICATION(S): at 18:12

## 2024-02-07 RX ADMIN — HEPARIN SODIUM 5000 UNIT(S): 5000 INJECTION INTRAVENOUS; SUBCUTANEOUS at 05:37

## 2024-02-07 RX ADMIN — TAMOXIFEN CITRATE 20 MILLIGRAM(S): 20 TABLET, FILM COATED ORAL at 11:56

## 2024-02-07 RX ADMIN — POTASSIUM PHOSPHATE, MONOBASIC POTASSIUM PHOSPHATE, DIBASIC 62.5 MILLIMOLE(S): 236; 224 INJECTION, SOLUTION INTRAVENOUS at 01:44

## 2024-02-07 RX ADMIN — Medication 1 DROP(S): at 22:28

## 2024-02-07 RX ADMIN — MIDODRINE HYDROCHLORIDE 10 MILLIGRAM(S): 2.5 TABLET ORAL at 13:23

## 2024-02-07 RX ADMIN — HYDROMORPHONE HYDROCHLORIDE 0.2 MILLIGRAM(S): 2 INJECTION INTRAMUSCULAR; INTRAVENOUS; SUBCUTANEOUS at 01:56

## 2024-02-07 RX ADMIN — CEFEPIME 100 MILLIGRAM(S): 1 INJECTION, POWDER, FOR SOLUTION INTRAMUSCULAR; INTRAVENOUS at 22:27

## 2024-02-07 RX ADMIN — DROXIDOPA 300 MILLIGRAM(S): 100 CAPSULE ORAL at 13:22

## 2024-02-07 RX ADMIN — Medication 100 MILLIGRAM(S): at 11:55

## 2024-02-07 RX ADMIN — Medication 1 DROP(S): at 05:38

## 2024-02-07 RX ADMIN — CEFEPIME 100 MILLIGRAM(S): 1 INJECTION, POWDER, FOR SOLUTION INTRAMUSCULAR; INTRAVENOUS at 05:37

## 2024-02-07 RX ADMIN — Medication 500 MILLIGRAM(S): at 11:56

## 2024-02-07 RX ADMIN — DROXIDOPA 400 MILLIGRAM(S): 100 CAPSULE ORAL at 05:37

## 2024-02-07 RX ADMIN — MIDODRINE HYDROCHLORIDE 10 MILLIGRAM(S): 2.5 TABLET ORAL at 22:27

## 2024-02-07 RX ADMIN — Medication 80 MICROGRAM(S): at 22:28

## 2024-02-07 RX ADMIN — Medication 100 MILLIGRAM(S): at 05:37

## 2024-02-07 RX ADMIN — Medication 1: at 00:38

## 2024-02-07 RX ADMIN — Medication 1 TABLET(S): at 11:55

## 2024-02-07 RX ADMIN — POLYETHYLENE GLYCOL 3350 17 GRAM(S): 17 POWDER, FOR SOLUTION ORAL at 11:56

## 2024-02-07 RX ADMIN — Medication 100 MILLIGRAM(S): at 18:11

## 2024-02-07 RX ADMIN — SENNA PLUS 2 TABLET(S): 8.6 TABLET ORAL at 22:27

## 2024-02-07 RX ADMIN — Medication 25 MILLIGRAM(S): at 05:38

## 2024-02-07 RX ADMIN — CEFEPIME 100 MILLIGRAM(S): 1 INJECTION, POWDER, FOR SOLUTION INTRAMUSCULAR; INTRAVENOUS at 13:50

## 2024-02-07 RX ADMIN — CHLORHEXIDINE GLUCONATE 1 APPLICATION(S): 213 SOLUTION TOPICAL at 05:38

## 2024-02-07 RX ADMIN — SERTRALINE 25 MILLIGRAM(S): 25 TABLET, FILM COATED ORAL at 11:56

## 2024-02-07 RX ADMIN — DROXIDOPA 300 MILLIGRAM(S): 100 CAPSULE ORAL at 23:03

## 2024-02-07 RX ADMIN — Medication 1 APPLICATION(S): at 18:07

## 2024-02-07 RX ADMIN — Medication 1 MILLIGRAM(S): at 11:56

## 2024-02-07 RX ADMIN — MIDODRINE HYDROCHLORIDE 10 MILLIGRAM(S): 2.5 TABLET ORAL at 06:08

## 2024-02-07 RX ADMIN — HEPARIN SODIUM 5000 UNIT(S): 5000 INJECTION INTRAVENOUS; SUBCUTANEOUS at 18:11

## 2024-02-07 RX ADMIN — Medication 1 APPLICATION(S): at 06:46

## 2024-02-07 NOTE — PROGRESS NOTE ADULT - ATTENDING COMMENTS
68F PMH TBI, CVA, nonverbal, bedbound/contracted, sacral decubitus, L breast cancer s/p resection who now presents with failure to thrive and MSSA bacteremia due to sacral ulcer and likely osteomyelitis. Now transferred to MICU for septic shock requiring pressors. Currently with resolved shock.    #Neuro: nonverbal at baseline, off sedation, mental status at baseline  #CV: HD stable, off norepinephrine. Decrease droxidopa to 300 mg q8h and wean as tolerates. Continue midodrine 10 mg q8h  #PULM: doing well on NC@2LPM, goal SpO2>90%. CTPA without PE. Bronchiectasis  #GI: oropharyngeal dysphagia s/p PEG. Continue tube feeds as tolerates. Bowel regimen  #Renal: stable kidney function and lytes, strict I/O's, close monitoring  #ID: continue wound care for sacral decubitus ulcer/OM. Continue cefepime and metronidazole per ID. Repeat blood cultures negative on 2/1  #Endo: continue Synthroid. Continue insulin coverage scale  #Heme/Onc: breast cancer s/p resection on tamoxifen  #DVT ppx with HSQ  #Dispo: full code, prognosis guarded, stable for transfer to floors

## 2024-02-07 NOTE — PROGRESS NOTE ADULT - PROBLEM SELECTOR PLAN 4
- QTc of 561  - Calcium gluconate 2mg IVx1 given this admission.   - Optimize K, Mg, Ca  - Avoid QT prolonging agents

## 2024-02-07 NOTE — PROGRESS NOTE ADULT - ASSESSMENT
68 f with  TBI, SAH, non-verbal at baseline, CVA, sacral ulcer, graves, L breast ca s/p mastectomy on tamoxifen, aspiration s/p PEG, multiple decubiti and sacral osteo, sent from NH for  multiple days of less responsiveness and fever, here febrile to 103.4, tachycardic, hypotensive to 70s presents from [place] due to multiple days of reduced responsiveness and fever. Found to have leukocytosis, tachycardia and hypotension here as well. Difficult to assess, contracted and non-verbal.  has multiple wound with necrotic edge  WBC: 14, Na: 151, u/a with 17 WBC  CT: no PE, sacral decub to the level of sacrum and ?osteo    fever, tachycardia, hypotension, leukocytosis, transaminitis, hypernatremia septic shock, due to MSSA bacteremia, ? wound related   has multiple wound which have slough but no jim purulent drainage and the MRI did not show osteo or abscess, just a sinus tract extending from L3  first blood cx had only 1/4 MSSA and repeat blood cx negative but pt deteriorated, TTE was not able to evaluate any valves due to contractures  * switched to cefepime 2 q 8 and flagyl 500 q 12 on 2/5 (when pt deteriorated and blood cx cleared)  * will likely do a 7-10 days of cefepime, flagyl and then changes to cefazolin complete a total 4 week course post negative blood cx through 2/29  * monitor CBC/diff and CMP  * continue wound care    The above assessment and plan was discussed with SALINAS Teixeira MD  contact on teams  After 5pm and on weekends call [phone]

## 2024-02-07 NOTE — CHART NOTE - NSCHARTNOTEFT_GEN_A_CORE
MAR Accept Note  Transfer to:  TELEMETRY  Accepting Attending Physician:    Assigned Room:      Patient seen and examined.   Labs and data reviewed.   No findings precluding transfer of service.       HPI/MICU COURSE:   Please refer to MICU transfer note for full details. Briefly, this is a      FOR FOLLOW-UP:    MD ULISES Marmolejo MAR Accept Note  Transfer to: MEDICINE  Accepting Attending Physician: Dr. Elizabeth Wang  Assigned Room:  3 [facility] [address]    Patient seen and examined.   Labs and data reviewed.   No findings precluding transfer of service.       HPI/MICU COURSE:   Please refer to MICU transfer note for full details. Briefly, this is a 68 year old F with hx of TBI, CVA (nonverbal, bedbound at baseline) hypothyroidism, L breast cancer (s/p mastectomy and breast tissue expander, on tamoxifen) who presents from [facility] for several days of lethargy, dec responsiveness, and fever. The patient was recently discharged (1/10/2024) after a prolonged hospitalization for UTI, hypernatremia, FTT, complicated by AHRF 2/2 to aspiration PNA. Patient admitted to MICU for septic shock ISO sacral ulcer w/ MSSA bacteremia. Patient initially on vancomycin and zosyn, narrowed to ancef based on blood culture sensitivities. Antibiotics were broadened to cefepime/flagyl per ID recs to cover possible gram negative sacral wound infection. Weaned off levophed 2/8 overnight, weaning off droxidopa. Patient on midodrine 15 mg TID (on midodrine 10 mg TID at home). Patient is stable for transfer to floors      FOR FOLLOW UP:  [ ] wound care recs  [ ] ID recs re abx course  [ ] cefepime trough.      MD ULISES Marmolejo

## 2024-02-07 NOTE — CHART NOTE - NSCHARTNOTEFT_GEN_A_CORE
Nutrition Follow Up Note  Patient seen for: malnutrition follow up  Chart reviewed, events noted    Per chart, patient is a 69 y/o female with PMH including h/o TBI, h/o CVA (nonverbal, bedbound at baseline), hypothyroidism, h/o L breast cancer (s/p mastectomy and breast tissue expander, on tamoxifen). Patient presents to [facility] from [facility] for several day h/o lethargy, decreased responsiveness, fever. Previously in MICU for hypotension requiring pressor support, presumed to be 2/2 sepsis i/s/o sacral ulcer w/ MSSA bacteremia. Per MD WBC downtrended, fevers resolved and hypotension improved w/ gradual weaning of pressor requirements.    Source: [] Patient       [x] Chart        [] RN        [x] Family at bedside       [] Other:  - If unable to interview patient: [] Trach/Vent/BiPAP  [x] Disoriented/confused/inappropriate to interview    Diet Order:   Diet, NPO with Tube Feed:   Tube Feeding Modality: Gastrostomy  Glucerna 1.5 Vidal (GLUCERNA1.5RTH)  Total Volume for 24 Hours (mL): 990  Continuous  Starting Tube Feed Rate {mL per Hour}: 10  Increase Tube Feed Rate by (mL): 10     Every 4 hours  Until Goal Tube Feed Rate (mL per Hour): 55  Tube Feed Duration (in Hours): 18  Tube Feed Start Time: 07:30   Frequency: Every 4 Hours (24)    Is current order appropriate/adequate? [x] Yes  []  No:     EN Provision x5 Days:  : 935cc  : 825cc  : 1035cc  2/3: 990cc  2: 250cc  = ~82% EN provision on average x5 days    Nutrition-related concerns:  - patient continues on 18 hour TFs w/ no reported issues at present, TFs off at time of visit with patient's family at bedside  - answered patient's 's questions regarding TF regimen and vitamin B12, concerned for level, requesting an updated lab draw for vitamin B12 to see if she needs supplementation  - prior mild hypernatremia , now WNL today  - hypophosphatemic today - supplementation noted  - continues on IV synthroid, 18 hour TF regimen will allow for PO synthroid administration once it is changed over from IV route    GI:  Last BM:   Bowel Regimen? [x] Yes: senna, miralax    Weights:   Daily Weight in k.7 (), Weight in k.4 (), Weight in k.4 ()  - overall weight trend noted with upswing near 10kg, continues to fluctuate w/ varying degrees of edema during time frame atop of having adequate EN provision while being underweight, will continue to monitor weight trend    Nutritionally Pertinent MEDICATIONS  (STANDING):  ascorbic acid  cefepime   IVPB  droxidopa  folic acid  insulin lispro (ADMELOG) corrective regimen sliding scale  levothyroxine Injectable  metroNIDAZOLE  IVPB  midodrine  multivitamin  norepinephrine Infusion  polyethylene glycol 3350  senna  tamoxifen  thiamine    Pertinent Labs:  @ 00:20: Na 142, BUN 17, Cr <0.30<L>, <H>, K+ 3.8, Phos 2.1<L>, Mg 2.3, Alk Phos 82, ALT/SGPT 10, AST/SGOT 13, HbA1c --    A1C with Estimated Average Glucose Result: 5.8 % (24 @ 06:48)  A1C with Estimated Average Glucose Result: 5.7 % (23 @ 10:33)    Finger Sticks:  POCT Blood Glucose.: 87 mg/dL ( @ 17:40)  POCT Blood Glucose.: 145 mg/dL ( @ 11:52)  POCT Blood Glucose.: 120 mg/dL ( @ 06:03)    Skin per nursing documentation: stage II PI to R ischium, stage IV PI to sacrum, unstageable PI to L shoulder, unstageable PI to L hip, unstageable PI to thoracic spine, stage IV PI to R hip, stage IV PI to R upper buttocks, suspected DTI to L heel, unstageable PIs to b/l ears per documentation  Edema: +1 dependent, +2 b/l foot/leg edema per documentation    Estimated Needs:   [x] no change since previous assessment  Estimated Caloric Needs: 1379-1524kcal/day (38-42kcal/kg)  Estimated Protein Needs: 62-83g/pro/day (1.7-2.3g/pro/kg)  Estimated Fluid Needs: defer to team  based on BW 36.3kg w/ considerations for underweight, severe malnutrition and multiple pressure injuries    Previous Nutrition Diagnosis: underweight, chronic-severe protein calorie malnutrition, increased nutrient needs  Nutrition Diagnosis is: [x] ongoing: being addressed w/ TF regimen    New Nutrition Diagnosis: [x] Not applicable    Nutrition Care Plan:  [x] In Progress  [] Achieved  [] Not applicable    Nutrition Interventions:     Education Provided:       [x] Yes: current EN regimen, answered patient's 's questions on TF regimen and vitamin B12    Recommendations:      1. continue TFs of Glucerna 1.5 w/ goal rate of 55cc/hr x18 hours (990cc total volume)      - regimen provides 1485kcal, 82g protein, 751cc free H2O daily (40kcal/kg, 2.2g/pro/kg based on BW 36.3kg)      - defer fluid management to team  2. patient's  inquiring about vitamin B12 and levels for the patient - request updated vitamin B12 level with next labs, supplement as appropriate  3. as medically feasible, continue vitamin C, folic acid, multivitamin, thiamine to help aid in wound healing  4. monitor tolerance of TF regimen, weight trend, electrolytes, blood glucose levels, labs, BMs, wound healing    Monitoring and Evaluation:   Continue to monitor nutritional intake, tolerance to diet prescription, weights, labs, skin integrity    RD remains available upon request and will follow up per protocol  Gabo Banks RD, CDN - contact on TEAMS.

## 2024-02-07 NOTE — PROGRESS NOTE ADULT - SUBJECTIVE AND OBJECTIVE BOX
[facility] Division of Hospital Medicine  Douglas Giordano DO  Pager (M-F, 8A-5P):  MS Teams PREFERRED        SUBJECTIVE / OVERNIGHT EVENTS:  Pt seen at bedside  no acute distress.     MEDICATIONS  (STANDING):  artificial  tears Solution 1 Drop(s) Both EYES two times a day  ascorbic acid 500 milliGRAM(s) Oral daily  cefepime   IVPB 1000 milliGRAM(s) IV Intermittent every 8 hours  chlorhexidine 2% Cloths 1 Application(s) Topical <User Schedule>  collagenase Ointment 1 Application(s) Topical two times a day  Dakins Solution - 1/4 Strength 1 Application(s) Topical every 12 hours  droxidopa 300 milliGRAM(s) Oral every 8 hours  folic acid 1 milliGRAM(s) Oral daily  heparin   Injectable 5000 Unit(s) SubCutaneous every 12 hours  insulin lispro (ADMELOG) corrective regimen sliding scale   SubCutaneous every 6 hours  levothyroxine Injectable 80 MICROGram(s) IV Push at bedtime  metroNIDAZOLE  IVPB 500 milliGRAM(s) IV Intermittent every 12 hours  midodrine 10 milliGRAM(s) Oral every 8 hours  multivitamin 1 Tablet(s) Oral daily  norepinephrine Infusion 0.05 MICROgram(s)/kG/Min (3.4 mL/Hr) IV Continuous <Continuous>  polyethylene glycol 3350 17 Gram(s) Oral daily  senna 2 Tablet(s) Oral at bedtime  sertraline 25 milliGRAM(s) Oral daily  tamoxifen 20 milliGRAM(s) Oral daily  thiamine 100 milliGRAM(s) Oral daily    MEDICATIONS  (PRN):  HYDROmorphone  Injectable 0.2 milliGRAM(s) IV Push every 8 hours PRN dressing change      I&O's Summary    06 Feb 2024 07:01  -  07 Feb 2024 07:00  --------------------------------------------------------  IN: 2480.7 mL / OUT: 2050 mL / NET: 430.7 mL    07 Feb 2024 07:01  -  07 Feb 2024 18:01  --------------------------------------------------------  IN: 625 mL / OUT: 400 mL / NET: 225 mL        PHYSICAL EXAM:  Vital Signs Last 24 Hrs  T(C): 36.7 (07 Feb 2024 16:20), Max: 37.3 (07 Feb 2024 00:00)  T(F): 98 (07 Feb 2024 16:20), Max: 99.1 (07 Feb 2024 00:00)  HR: 78 (07 Feb 2024 16:20) (56 - 89)  BP: 100/67 (07 Feb 2024 16:20) (80/53 - 124/66)  BP(mean): 83 (07 Feb 2024 15:00) (61 - 88)  RR: 20 (07 Feb 2024 16:20) (6 - 28)  SpO2: 95% (07 Feb 2024 16:20) (93% - 100%)    Parameters below as of 07 Feb 2024 16:20  Patient On (Oxygen Delivery Method): room air      General: A&Ox0, NAD, opens eyes to voice and squeezes hands to command  HEENT: mucous membranes slightly dry  Neck: supple  Chest/Lungs: CTAB, no WRR  Heart: RRR, no murmurs. No cyanosis  Abdomen: soft, nontender, nondistended   Extremities: contracted c/w baseline, no edema, palpable pulses  Skin: multiple pressure injuries to sacrum/right hip/buttocks, left shoulder/hip, lumbar spine, L foot   Neuro: awake, nonverbal (c/w baseline) but opens eyes and squeezes hands to command      LABS:                        8.7    9.33  )-----------( 248      ( 07 Feb 2024 00:20 )             30.3     02-07    142  |  106  |  17  ----------------------------<  161<H>  3.8   |  26  |  <0.30<L>    Ca    8.1<L>      07 Feb 2024 00:20  Phos  2.1     02-07  Mg     2.3     02-07    TPro  5.4<L>  /  Alb  2.9<L>  /  TBili  0.4  /  DBili  x   /  AST  13  /  ALT  10  /  AlkPhos  82  02-07          Urinalysis Basic - ( 07 Feb 2024 00:20 )    Color: x / Appearance: x / SG: x / pH: x  Gluc: 161 mg/dL / Ketone: x  / Bili: x / Urobili: x   Blood: x / Protein: x / Nitrite: x   Leuk Esterase: x / RBC: x / WBC x   Sq Epi: x / Non Sq Epi: x / Bacteria: x          RADIOLOGY & ADDITIONAL TESTS:  Results Reviewed: CBC/CMP personally reviewed by me Cr 0.30 Na 142 Hgb 8.7   Imaging Personally Reviewed:  Electrocardiogram Personally Reviewed:    COORDINATION OF CARE:  Care Discussed with Consultants/Other Providers [Y/N]:  Prior or Outpatient Records Reviewed [Y/N]:

## 2024-02-07 NOTE — PROGRESS NOTE ADULT - ASSESSMENT
69 yo F with hx TBI (nonverbal, bedbound/contracted at baseline) s/p PEG last admission due to recurrent hypernatremia and chronic malnutrition, CVA, hypothyroidism, L breast cancer (s/p mastectomy tissue expander still in place ) who presented for decreased responsiveness and hypotension, found to have sepsis with MSSA bacteremia, likely source sacral ulcer w/ c/f possible osteomyelitis. Now being transferred to MICU for hypotension 2/2 likely septic shock, requiring pressors.     ======== Neuro =================  - AOx0, nonverbal c/w baseline mental status  - home sertraline 25 mg QD    ========Cardiovascular============  # shock, likely septic ISO sacral decub ulcer  - home med: midodrine 5 mg q8h  - now off levo  - d/c'd midodrine due to bradycardia, now restarted at 10 mg q8h as HRs normalized   - droxidopa 400 mg q8h  - MAP goal >60  - sepsis workup/tx as outlined in ID plan  - hydrocortisone tapered to 25 mg q12h     #QT prolongation  - QTc 561, 470s on repeat  - K>4, Mg>2  - avoid QT prolonging agents    ==========Respiratory===============  - basal consolidation seen on POCUS, aspiration PNA vs atelectasis  - on empiric cefepime/flagyll  - on RA   - CTA negative for PE  - wean O2 as tolerated   - MRSA swab negative      ===========GI/nutrition============  > s/p PEG  - diet NPO w/ TFs    > constipation, chronic  - cont miralax, senna    ===========RENAL/==============  - renal function at baseline, monitor for JAVIER ISO hemodynamic instability  - strict I/Os  - passed TOV    #hypernatremia  - free water 200 cc q6h   - trend, adjust free water as needed     ========== SKIN =================  - extensive lumbar/sacral/gluteal unstageable pressure wounds   - MRI spine with prominent sacral decubitus ulcer extending to the coccygeal tip without evidence of underlying osteomyelitis or discrete fluid collections in the surrounding soft tissues  - blood cultures growing MSSA 1/31, subsequent cultures NGTD   - wound care following, appreciate recs  - broadened abx from cefazolin to cefepime/flagyll per ID recs     =========== ID ==============  > sepsis  > MSSA bacteremia  > sacral wound, likely source of sepsis/bacteremia  - WBC 15k, hypotensive 2/2 likely septic shock- WBC now downtrending since broadening abx  - ABG lactate 2.0  - broadened abx from cefazolin to cefepime/flagyll per ID recs 2/5  - repeat blood cultures negative, clear as of 1/1   - unable to undergo TTE due to contractures   - ID following, f/u recs     ========== ENDOCRINE ===========  > hypothyroidism  - continue levothyroxine     ==========Heme/onc/DVT========  # hx breast cancer s/p mastectomy  - continue tamoxifen    # DVT ppx  - heparin SQ     #Ethics  - code status: FULL    69 yo F with hx TBI (nonverbal, bedbound/contracted at baseline) s/p PEG last admission due to recurrent hypernatremia and chronic malnutrition, CVA, hypothyroidism, L breast cancer (s/p mastectomy tissue expander still in place ) who presented for decreased responsiveness and hypotension, found to have sepsis with MSSA bacteremia, likely source sacral ulcer w/ c/f possible osteomyelitis. Now being transferred to MICU for hypotension 2/2 likely septic shock, requiring pressors.     ======== Neuro =================  - AOx0, nonverbal c/w baseline mental status  - home sertraline 25 mg QD    ========Cardiovascular============  # shock, likely septic ISO sacral decub ulcer  - home med: midodrine 5 mg q8h  - now off levo  - d/c'd midodrine due to bradycardia, now restarted at 10 mg q8h as HRs normalized. Continue current dosing, plan to wean midodrine back to home dose after weaning droxidopa  - droxidopa - decrease to 300 mg q8h.  - MAP goal >60  - sepsis workup/tx as outlined in ID plan  - stop hydrocortisone     #QT prolongation  - QTc 561, 470s on repeat  - K>4, Mg>2  - avoid QT prolonging agents    ==========Respiratory===============  - basal consolidation seen on POCUS, aspiration PNA vs atelectasis  - on empiric cefepime/flagyll  - on RA   - CTA negative for PE  - wean O2 as tolerated   - MRSA swab negative      ===========GI/nutrition============  > s/p PEG  - diet NPO w/ TFs    > constipation, chronic  - cont miralax, senna    ===========RENAL/==============  - renal function at baseline, monitor for JAVIER ISO hemodynamic instability  - strict I/Os  - passed TOV    #hypernatremia  - free water 200 cc q6h   - trend, adjust free water as needed     ========== SKIN =================  - extensive lumbar/sacral/gluteal unstageable pressure wounds   - MRI spine with prominent sacral decubitus ulcer extending to the coccygeal tip without evidence of underlying osteomyelitis or discrete fluid collections in the surrounding soft tissues  - blood cultures growing MSSA 1/31, subsequent cultures NGTD   - wound care following, appreciate recs  - broadened abx from cefazolin to cefepime/flagyll per ID recs     =========== ID ==============  > sepsis  > MSSA bacteremia  > sacral wound, likely source of sepsis/bacteremia  - WBC 15k, hypotensive 2/2 likely septic shock- WBC now downtrending since broadening abx  - ABG lactate 2.0  - broadened abx from cefazolin to cefepime/flagyll per ID recs 2/5  - repeat blood cultures negative, clear as of 1/1   - unable to undergo TTE due to contractures   - ID following, f/u recs   - f/u cefepime trough (per pharmacy request)    ========== ENDOCRINE ===========  > hypothyroidism  - continue levothyroxine     ==========Heme/onc/DVT========  # hx breast cancer s/p mastectomy  - continue tamoxifen    # DVT ppx  - heparin SQ     #Ethics  - code status: FULL

## 2024-02-07 NOTE — PROGRESS NOTE ADULT - SUBJECTIVE AND OBJECTIVE BOX
Follow Up:  sepsis    Interval History/ROS: pt was doing well and WBC normal but had a fever last night    ROS:    Unobtainable because: non verbal        Allergies  Tapazole (Hives)        ANTIMICROBIALS:  cefepime   IVPB 1000 every 8 hours  metroNIDAZOLE  IVPB 500 every 12 hours      OTHER MEDS:  artificial  tears Solution 1 Drop(s) Both EYES two times a day  ascorbic acid 500 milliGRAM(s) Oral daily  chlorhexidine 2% Cloths 1 Application(s) Topical <User Schedule>  collagenase Ointment 1 Application(s) Topical two times a day  Dakins Solution - 1/4 Strength 1 Application(s) Topical every 12 hours  folic acid 1 milliGRAM(s) Oral daily  heparin   Injectable 5000 Unit(s) SubCutaneous every 12 hours  HYDROmorphone  Injectable 0.2 milliGRAM(s) IV Push every 8 hours PRN  insulin lispro (ADMELOG) corrective regimen sliding scale   SubCutaneous every 6 hours  levothyroxine Injectable 80 MICROGram(s) IV Push at bedtime  midodrine 10 milliGRAM(s) Oral every 8 hours  multivitamin 1 Tablet(s) Oral daily  norepinephrine Infusion 0.05 MICROgram(s)/kG/Min IV Continuous <Continuous>  polyethylene glycol 3350 17 Gram(s) Oral daily  senna 2 Tablet(s) Oral at bedtime  sertraline 25 milliGRAM(s) Oral daily  tamoxifen 20 milliGRAM(s) Oral daily  thiamine 100 milliGRAM(s) Oral daily      Vital Signs Last 24 Hrs  T(C): 37 (07 Feb 2024 08:00), Max: 37.3 (07 Feb 2024 00:00)  T(F): 98.6 (07 Feb 2024 08:00), Max: 99.1 (07 Feb 2024 00:00)  HR: 64 (07 Feb 2024 08:00) (54 - 89)  BP: 110/64 (07 Feb 2024 08:00) (77/48 - 110/64)  BP(mean): 82 (07 Feb 2024 08:00) (58 - 82)  RR: 20 (07 Feb 2024 08:00) (13 - 28)  SpO2: 95% (07 Feb 2024 08:00) (93% - 100%)    Parameters below as of 06 Feb 2024 22:30  Patient On (Oxygen Delivery Method): room air        Physical Exam:  General:    cachectic  Respiratory:   comfortable on NC  abd:   soft, PEG with brownish drainage around it, not tender  :   no avila  Musculoskeletal : contracted, no joint swelling, no edema  Skin: multiple wounds including sacral with necrotic edge, back with linear eschar  vascular: no phlebitis                          8.7    9.33  )-----------( 248      ( 07 Feb 2024 00:20 )             30.3       02-07    142  |  106  |  17  ----------------------------<  161<H>  3.8   |  26  |  <0.30<L>    Ca    8.1<L>      07 Feb 2024 00:20  Phos  2.1     02-07  Mg     2.3     02-07    TPro  5.4<L>  /  Alb  2.9<L>  /  TBili  0.4  /  DBili  x   /  AST  13  /  ALT  10  /  AlkPhos  82  02-07      Urinalysis Basic - ( 07 Feb 2024 00:20 )    Color: x / Appearance: x / SG: x / pH: x  Gluc: 161 mg/dL / Ketone: x  / Bili: x / Urobili: x   Blood: x / Protein: x / Nitrite: x   Leuk Esterase: x / RBC: x / WBC x   Sq Epi: x / Non Sq Epi: x / Bacteria: x        MICROBIOLOGY:  v  .Genital Vaginal  02-03-24   Normal genital forrest isolated  --  --      .Blood Blood-Peripheral  02-02-24   No growth at 4 days  --  --      .Blood Blood-Peripheral  02-02-24   No growth at 4 days  --  --      .Blood Blood-Peripheral  02-02-24   No growth at 4 days  --  --      .Blood Blood-Peripheral  02-01-24   No growth at 5 days  --  --      Clean Catch Clean Catch (Midstream)  01-30-24   <10,000 CFU/mL Normal Urogenital Forrest  --  --      .Blood Blood-Peripheral  01-30-24   Growth in aerobic and anaerobic bottles: Staphylococcus aureus  Direct identification is available within approximately 3-5  hours either by Blood Panel Multiplexed PCR or Direct  MALDI-TOF. Details: [email or web address]  --  Blood Culture PCR  Staphylococcus aureus      .Blood Blood-Peripheral  01-30-24   No growth at 5 days  --  --      Catheterized Catheterized  01-18-24   <10,000 CFU/mL Normal Urogenital Forrest  --  --                RADIOLOGY:  Images independently visualized and reviewed personally, findings as below  < from: US Abdomen Complete (US Abdomen Complete .) (02.01.24 @ 10:13) >  IMPRESSION:    Hepatic steatosis. No focal hepatic lesion.  Cholelithiasis without evidence of cholecystitis.  1.5 cm echogenic lesion in the posterior right renal cortex may be   minimally increased in size compared to prior ultrasound performed   4/21/2021, and corresponds to enhancing lesion noted on CT. Findings   suggest angiomyolipoma. Correlation with MRI can be performed for further   evaluation.    < end of copied text >  < from: MR Lumbar Spine w/ IV Cont (02.01.24 @ 08:56) >    IMPRESSION:  Prominent sacral decubitus ulcer extending to the coccygeal tip without   evidence of underlying osteomyelitis or discrete fluid collections in the   surrounding soft tissues.    Sinus tract is seen extending from the skin surface to the L3 spinous   process which may represent additional ulcer. Recommend correlation with   clinical aspects of the case. No evidence of osteomyelitis in the   underlying L3 spinous process.    Multilevel chronic compression deformities and lumbar spondylosis as   described.    < end of copied text >  < from: TTE Limited W or WO Ultrasound Enhancing Agent (02.03.24 @ 13:20) >  CONCLUSIONS:      1. Non-diagnostic image quality.   2. Unable to evaluate left ventricular ejection fraction.      < end of copied text >     Follow Up:  sepsis    Interval History/ROS: pt still in MICU but off pressors, no fever    ROS:    Unobtainable because: non verbal        Allergies  Tapazole (Hives)        ANTIMICROBIALS:  cefepime   IVPB 1000 every 8 hours  metroNIDAZOLE  IVPB 500 every 12 hours      OTHER MEDS:  artificial  tears Solution 1 Drop(s) Both EYES two times a day  ascorbic acid 500 milliGRAM(s) Oral daily  chlorhexidine 2% Cloths 1 Application(s) Topical <User Schedule>  collagenase Ointment 1 Application(s) Topical two times a day  Dakins Solution - 1/4 Strength 1 Application(s) Topical every 12 hours  folic acid 1 milliGRAM(s) Oral daily  heparin   Injectable 5000 Unit(s) SubCutaneous every 12 hours  HYDROmorphone  Injectable 0.2 milliGRAM(s) IV Push every 8 hours PRN  insulin lispro (ADMELOG) corrective regimen sliding scale   SubCutaneous every 6 hours  levothyroxine Injectable 80 MICROGram(s) IV Push at bedtime  midodrine 10 milliGRAM(s) Oral every 8 hours  multivitamin 1 Tablet(s) Oral daily  norepinephrine Infusion 0.05 MICROgram(s)/kG/Min IV Continuous <Continuous>  polyethylene glycol 3350 17 Gram(s) Oral daily  senna 2 Tablet(s) Oral at bedtime  sertraline 25 milliGRAM(s) Oral daily  tamoxifen 20 milliGRAM(s) Oral daily  thiamine 100 milliGRAM(s) Oral daily      Vital Signs Last 24 Hrs  T(C): 37 (07 Feb 2024 08:00), Max: 37.3 (07 Feb 2024 00:00)  T(F): 98.6 (07 Feb 2024 08:00), Max: 99.1 (07 Feb 2024 00:00)  HR: 64 (07 Feb 2024 08:00) (54 - 89)  BP: 110/64 (07 Feb 2024 08:00) (77/48 - 110/64)  BP(mean): 82 (07 Feb 2024 08:00) (58 - 82)  RR: 20 (07 Feb 2024 08:00) (13 - 28)  SpO2: 95% (07 Feb 2024 08:00) (93% - 100%)    Parameters below as of 06 Feb 2024 22:30  Patient On (Oxygen Delivery Method): room air        Physical Exam:  General:    cachectic  Respiratory:   clear  abd:   soft, PEG with brownish drainage around it, not tender  :   no avila  Musculoskeletal : contracted, no joint swelling, no edema  Skin: multiple wounds including sacral with necrotic edge, back with linear eschar  vascular: no phlebitis                          8.7    9.33  )-----------( 248      ( 07 Feb 2024 00:20 )             30.3       02-07    142  |  106  |  17  ----------------------------<  161<H>  3.8   |  26  |  <0.30<L>    Ca    8.1<L>      07 Feb 2024 00:20  Phos  2.1     02-07  Mg     2.3     02-07    TPro  5.4<L>  /  Alb  2.9<L>  /  TBili  0.4  /  DBili  x   /  AST  13  /  ALT  10  /  AlkPhos  82  02-07      Urinalysis Basic - ( 07 Feb 2024 00:20 )    Color: x / Appearance: x / SG: x / pH: x  Gluc: 161 mg/dL / Ketone: x  / Bili: x / Urobili: x   Blood: x / Protein: x / Nitrite: x   Leuk Esterase: x / RBC: x / WBC x   Sq Epi: x / Non Sq Epi: x / Bacteria: x        MICROBIOLOGY:  v  .Genital Vaginal  02-03-24   Normal genital forrest isolated  --  --      .Blood Blood-Peripheral  02-02-24   No growth at 4 days  --  --      .Blood Blood-Peripheral  02-02-24   No growth at 4 days  --  --      .Blood Blood-Peripheral  02-02-24   No growth at 4 days  --  --      .Blood Blood-Peripheral  02-01-24   No growth at 5 days  --  --      Clean Catch Clean Catch (Midstream)  01-30-24   <10,000 CFU/mL Normal Urogenital Forrest  --  --      .Blood Blood-Peripheral  01-30-24   Growth in aerobic and anaerobic bottles: Staphylococcus aureus  Direct identification is available within approximately 3-5  hours either by Blood Panel Multiplexed PCR or Direct  MALDI-TOF. Details: [email or web address]  --  Blood Culture PCR  Staphylococcus aureus      .Blood Blood-Peripheral  01-30-24   No growth at 5 days  --  --      Catheterized Catheterized  01-18-24   <10,000 CFU/mL Normal Urogenital Forrest  --  --                RADIOLOGY:  Images independently visualized and reviewed personally, findings as below  < from: US Abdomen Complete (US Abdomen Complete .) (02.01.24 @ 10:13) >  IMPRESSION:    Hepatic steatosis. No focal hepatic lesion.  Cholelithiasis without evidence of cholecystitis.  1.5 cm echogenic lesion in the posterior right renal cortex may be   minimally increased in size compared to prior ultrasound performed   4/21/2021, and corresponds to enhancing lesion noted on CT. Findings   suggest angiomyolipoma. Correlation with MRI can be performed for further   evaluation.    < end of copied text >  < from: MR Lumbar Spine w/ IV Cont (02.01.24 @ 08:56) >    IMPRESSION:  Prominent sacral decubitus ulcer extending to the coccygeal tip without   evidence of underlying osteomyelitis or discrete fluid collections in the   surrounding soft tissues.    Sinus tract is seen extending from the skin surface to the L3 spinous   process which may represent additional ulcer. Recommend correlation with   clinical aspects of the case. No evidence of osteomyelitis in the   underlying L3 spinous process.    Multilevel chronic compression deformities and lumbar spondylosis as   described.    < end of copied text >  < from: TTE Limited W or WO Ultrasound Enhancing Agent (02.03.24 @ 13:20) >  CONCLUSIONS:      1. Non-diagnostic image quality.   2. Unable to evaluate left ventricular ejection fraction.      < end of copied text >

## 2024-02-07 NOTE — CHART NOTE - NSCHARTNOTEFT_GEN_A_CORE
ICU Transfer Note    Transfer from: ICU    Transfer to: ( x ) Medicine    (  ) Telemetry     (   ) RCU        (    ) Palliative         (   ) Stroke Unit          (   ) __________________    Accepting Physician:   Signout given to:     HPI/ICU COURSE:          ASSESSMENT & PLAN:             FOR FOLLOW UP: ICU Transfer Note    Transfer from: ICU    Transfer to: ( x ) Medicine    (  ) Telemetry     (   ) RCU        (    ) Palliative         (   ) Stroke Unit          (   ) __________________    Accepting Physician:   Signout given to:     HPI/ICU COURSE:          ASSESSMENT & PLAN:   67 yo F with hx TBI (nonverbal, bedbound/contracted at baseline) s/p PEG last admission due to recurrent hypernatremia and chronic malnutrition, CVA, hypothyroidism, L breast cancer (s/p mastectomy tissue expander still in place ) who presented for decreased responsiveness and hypotension, found to have sepsis with MSSA bacteremia, likely source sacral ulcer w/ c/f possible osteomyelitis. Now being transferred to MICU for hypotension 2/2 likely septic shock, requiring pressors.     ======== Neuro =================  - AOx0, nonverbal c/w baseline mental status  - home sertraline 25 mg QD    ========Cardiovascular============  # shock, likely septic ISO sacral decub ulcer  - home med: midodrine 5 mg q8h  - now off levo  - d/c'd midodrine due to bradycardia, now restarted at 10 mg q8h as HRs normalized. Continue current dosing, plan to wean midodrine back to home dose after weaning droxidopa  - droxidopa - decrease to 300 mg q8h.  - MAP goal >60  - sepsis workup/tx as outlined in ID plan  - stop hydrocortisone     #QT prolongation  - QTc 561, 470s on repeat  - K>4, Mg>2  - avoid QT prolonging agents    ==========Respiratory===============  - basal consolidation seen on POCUS, aspiration PNA vs atelectasis  - on empiric cefepime/flagyll  - on RA   - CTA negative for PE  - wean O2 as tolerated   - MRSA swab negative      ===========GI/nutrition============  > s/p PEG  - diet NPO w/ TFs    > constipation, chronic  - cont miralax, senna    ===========RENAL/==============  - renal function at baseline, monitor for JAVIER ISO hemodynamic instability  - strict I/Os  - passed TOV    #hypernatremia  - free water 200 cc q6h   - trend, adjust free water as needed     ========== SKIN =================  - extensive lumbar/sacral/gluteal unstageable pressure wounds   - MRI spine with prominent sacral decubitus ulcer extending to the coccygeal tip without evidence of underlying osteomyelitis or discrete fluid collections in the surrounding soft tissues  - blood cultures growing MSSA 1/31, subsequent cultures NGTD   - wound care following, appreciate recs  - broadened abx from cefazolin to cefepime/flagyll per ID recs     =========== ID ==============  > sepsis  > MSSA bacteremia  > sacral wound, likely source of sepsis/bacteremia  - WBC 15k, hypotensive 2/2 likely septic shock- WBC now downtrending since broadening abx  - ABG lactate 2.0  - broadened abx from cefazolin to cefepime/flagyll per ID recs 2/5  - repeat blood cultures negative, clear as of 1/1   - unable to undergo TTE due to contractures   - ID following, f/u recs   - f/u cefepime trough (per pharmacy request)    ========== ENDOCRINE ===========  > hypothyroidism  - continue levothyroxine     ==========Heme/onc/DVT========  # hx breast cancer s/p mastectomy  - continue tamoxifen    # DVT ppx  - heparin SQ     #Ethics  - code status: FULL     FOR FOLLOW UP:  [ ] wound care recs  [ ] ID recs re abx course  [ ] cefepime trough ICU Transfer Note    Transfer from: ICU    Transfer to: ( x ) Medicine    (  ) Telemetry     (   ) RCU        (    ) Palliative         (   ) Stroke Unit          (   ) __________________    Accepting Physician:   Signout given to:     HPI/ICU COURSE:    Patient is a 68 year old F with hx of TBI, CVA (nonverbal, bedbound at baseline) hypothyroidism, L breast cancer (s/p mastectomy and breast tissue expander, on tamoxifen) who presents from [facility] for several days of lethargy, dec responsiveness, and fever. The patient was recently discharged (1/10/2024) after a prolonged hospitalization for UTI, hypernatremia, FTT, complicated by AHRF 2/2 to aspiration PNA.     In the ED, VS notable for HR , BP 70-90/50-60s, Tmax 103.4F, SpO2 100% on 2L NC. Labs remarkable for WBC 14.75, hgb 10.5, Na 151, Cl 115, and AST/ALT elevation. The patient received vancomycin, zosyn, 3L NS, 20 midodrine, 50 IV hydrocortisone, and started on mIVF with 1/2 NS. CT remarkable for sacral decubitus ulcer with possible osteomyelitis. Patient also found to have MSSA bacteremia on 1/30. Initially managed on vanc/zosyn -> transitioned to ancef 2/1. On 2/2 RRT called for hypotension, received 1L bolus + midodrine with minimal improvement in BPs, started on levophed.     Patient transferred to MICU for hypotension requiring pressors, presumed to be 2/2 sepsis ISO sacral ulcer w/ MSSA bacteremia. Subsequent blood cultures clear. Patient was followed by ID, antibiotics were re-broadened from ancef to cefepime/flagyll per ID recs to cover possible gram negative wound infection. WBC downtrended, fevers resolved and hypotension improved with gradual weaning of pressor requirements. Weaned off levophed 2/8 overnight, weaning off droxidopa. O2 requirements weaned to RA. At this time patient is stable for transfer to medicine floor.        ASSESSMENT & PLAN:   67 yo F with hx TBI (nonverbal, bedbound/contracted at baseline) s/p PEG last admission due to recurrent hypernatremia and chronic malnutrition, CVA, hypothyroidism, L breast cancer (s/p mastectomy tissue expander still in place ) who presented for decreased responsiveness and hypotension, found to have sepsis with MSSA bacteremia, likely source sacral ulcer w/ c/f possible osteomyelitis. Now being transferred to MICU for hypotension 2/2 likely septic shock, requiring pressors.     ======== Neuro =================  - AOx0, nonverbal c/w baseline mental status  - home sertraline 25 mg QD    ========Cardiovascular============  # shock, likely septic ISO sacral decub ulcer  - home med: midodrine 5 mg q8h  - now off levo  - d/c'd midodrine due to bradycardia, now restarted at 10 mg q8h as HRs normalized. Continue current dosing, plan to wean midodrine back to home dose after weaning droxidopa  - droxidopa - decrease to 300 mg q8h.  - MAP goal >60  - sepsis workup/tx as outlined in ID plan  - stop hydrocortisone     #QT prolongation  - QTc 561, 470s on repeat  - K>4, Mg>2  - avoid QT prolonging agents    ==========Respiratory===============  - basal consolidation seen on POCUS, aspiration PNA vs atelectasis  - on empiric cefepime/flagyll  - on RA   - CTA negative for PE  - wean O2 as tolerated   - MRSA swab negative      ===========GI/nutrition============  > s/p PEG  - diet NPO w/ TFs    > constipation, chronic  - cont miralax, senna    ===========RENAL/==============  - renal function at baseline, monitor for JAVIER ISO hemodynamic instability  - strict I/Os  - passed TOV    #hypernatremia  - free water 200 cc q6h   - trend, adjust free water as needed     ========== SKIN =================  - extensive lumbar/sacral/gluteal unstageable pressure wounds   - MRI spine with prominent sacral decubitus ulcer extending to the coccygeal tip without evidence of underlying osteomyelitis or discrete fluid collections in the surrounding soft tissues  - blood cultures growing MSSA 1/31, subsequent cultures NGTD   - wound care following, appreciate recs  - broadened abx from cefazolin to cefepime/flagyll per ID recs     =========== ID ==============  > sepsis  > MSSA bacteremia  > sacral wound, likely source of sepsis/bacteremia  - WBC 15k, hypotensive 2/2 likely septic shock- WBC now downtrending since broadening abx  - ABG lactate 2.0  - broadened abx from cefazolin to cefepime/flagyll per ID recs 2/5  - repeat blood cultures negative, clear as of 1/1   - unable to undergo TTE due to contractures   - ID following, f/u recs   - f/u cefepime trough (per pharmacy request)    ========== ENDOCRINE ===========  > hypothyroidism  - continue levothyroxine     ==========Heme/onc/DVT========  # hx breast cancer s/p mastectomy  - continue tamoxifen    # DVT ppx  - heparin SQ     #Ethics  - code status: FULL     FOR FOLLOW UP:  [ ] wound care recs  [ ] ID recs re abx course  [ ] cefepime trough ICU Transfer Note    Transfer from: ICU    Transfer to: ( x ) Medicine    (  ) Telemetry     (   ) RCU        (    ) Palliative         (   ) Stroke Unit          (   ) __________________    Accepting Physician: Elizabeth Wang  Signout given to:     HPI/ICU COURSE:    Patient is a 68 year old F with hx of TBI, CVA (nonverbal, bedbound at baseline) hypothyroidism, L breast cancer (s/p mastectomy and breast tissue expander, on tamoxifen) who presents from [facility] for several days of lethargy, dec responsiveness, and fever. The patient was recently discharged (1/10/2024) after a prolonged hospitalization for UTI, hypernatremia, FTT, complicated by AHRF 2/2 to aspiration PNA.     In the ED, VS notable for HR , BP 70-90/50-60s, Tmax 103.4F, SpO2 100% on 2L NC. Labs remarkable for WBC 14.75, hgb 10.5, Na 151, Cl 115, and AST/ALT elevation. The patient received vancomycin, zosyn, 3L NS, 20 midodrine, 50 IV hydrocortisone, and started on mIVF with 1/2 NS. CT remarkable for sacral decubitus ulcer with possible osteomyelitis. Patient also found to have MSSA bacteremia on 1/30. Initially managed on vanc/zosyn -> transitioned to ancef 2/1. On 2/2 RRT called for hypotension, received 1L bolus + midodrine with minimal improvement in BPs, started on levophed.     Patient transferred to MICU for hypotension requiring pressors, presumed to be 2/2 sepsis ISO sacral ulcer w/ MSSA bacteremia. Subsequent blood cultures clear. Patient was followed by ID, antibiotics were re-broadened from ancef to cefepime/flagyll per ID recs to cover possible gram negative wound infection. WBC downtrended, fevers resolved and hypotension improved with gradual weaning of pressor requirements. Weaned off levophed 2/8 overnight, weaning off droxidopa. O2 requirements weaned to RA. At this time patient is stable for transfer to medicine floor.        ASSESSMENT & PLAN:   67 yo F with hx TBI (nonverbal, bedbound/contracted at baseline) s/p PEG last admission due to recurrent hypernatremia and chronic malnutrition, CVA, hypothyroidism, L breast cancer (s/p mastectomy tissue expander still in place ) who presented for decreased responsiveness and hypotension, found to have sepsis with MSSA bacteremia, likely source sacral ulcer w/ c/f possible osteomyelitis. Now being transferred to MICU for hypotension 2/2 likely septic shock, requiring pressors.     ======== Neuro =================  - AOx0, nonverbal c/w baseline mental status  - home sertraline 25 mg QD    ========Cardiovascular============  # shock, likely septic ISO sacral decub ulcer  - home med: midodrine 5 mg q8h  - now off levo  - d/c'd midodrine due to bradycardia, now restarted at 10 mg q8h as HRs normalized. Continue current dosing, plan to wean midodrine back to home dose after weaning droxidopa  - droxidopa - decrease to 300 mg q8h.  - MAP goal >60  - sepsis workup/tx as outlined in ID plan  - stop hydrocortisone     #QT prolongation  - QTc 561, 470s on repeat  - K>4, Mg>2  - avoid QT prolonging agents    ==========Respiratory===============  - basal consolidation seen on POCUS, aspiration PNA vs atelectasis  - on empiric cefepime/flagyll  - on RA   - CTA negative for PE  - wean O2 as tolerated   - MRSA swab negative      ===========GI/nutrition============  > s/p PEG  - diet NPO w/ TFs    > constipation, chronic  - cont miralax, senna    ===========RENAL/==============  - renal function at baseline, monitor for JAVIER ISO hemodynamic instability  - strict I/Os  - passed TOV    #hypernatremia  - free water 200 cc q6h   - trend, adjust free water as needed     ========== SKIN =================  - extensive lumbar/sacral/gluteal unstageable pressure wounds   - MRI spine with prominent sacral decubitus ulcer extending to the coccygeal tip without evidence of underlying osteomyelitis or discrete fluid collections in the surrounding soft tissues  - blood cultures growing MSSA 1/31, subsequent cultures NGTD   - wound care following, appreciate recs  - broadened abx from cefazolin to cefepime/flagyll per ID recs     =========== ID ==============  > sepsis  > MSSA bacteremia  > sacral wound, likely source of sepsis/bacteremia  - WBC 15k, hypotensive 2/2 likely septic shock- WBC now downtrending since broadening abx  - ABG lactate 2.0  - broadened abx from cefazolin to cefepime/flagyll per ID recs 2/5  - repeat blood cultures negative, clear as of 1/1   - unable to undergo TTE due to contractures   - ID following, f/u recs   - f/u cefepime trough (per pharmacy request)    ========== ENDOCRINE ===========  > hypothyroidism  - continue levothyroxine     ==========Heme/onc/DVT========  # hx breast cancer s/p mastectomy  - continue tamoxifen    # DVT ppx  - heparin SQ     #Ethics  - code status: FULL     FOR FOLLOW UP:  [ ] wound care recs  [ ] ID recs re abx course  [ ] cefepime trough ICU Transfer Note    Transfer from: ICU    Transfer to: ( x ) Medicine    (  ) Telemetry     (   ) RCU        (    ) Palliative         (   ) Stroke Unit          (   ) __________________    Accepting Physician: Elizabeth Wang  Signout given to: Tanya Harvey NP    HPI/ICU COURSE:    Patient is a 68 year old F with hx of TBI, CVA (nonverbal, bedbound at baseline) hypothyroidism, L breast cancer (s/p mastectomy and breast tissue expander, on tamoxifen) who presents from [facility] for several days of lethargy, dec responsiveness, and fever. The patient was recently discharged (1/10/2024) after a prolonged hospitalization for UTI, hypernatremia, FTT, complicated by AHRF 2/2 to aspiration PNA.     In the ED, VS notable for HR , BP 70-90/50-60s, Tmax 103.4F, SpO2 100% on 2L NC. Labs remarkable for WBC 14.75, hgb 10.5, Na 151, Cl 115, and AST/ALT elevation. The patient received vancomycin, zosyn, 3L NS, 20 midodrine, 50 IV hydrocortisone, and started on mIVF with 1/2 NS. CT remarkable for sacral decubitus ulcer with possible osteomyelitis. Patient also found to have MSSA bacteremia on 1/30. Initially managed on vanc/zosyn -> transitioned to ancef 2/1. On 2/2 RRT called for hypotension, received 1L bolus + midodrine with minimal improvement in BPs, started on levophed.     Patient transferred to MICU for hypotension requiring pressors, presumed to be 2/2 sepsis ISO sacral ulcer w/ MSSA bacteremia. Subsequent blood cultures clear. Patient was followed by ID, antibiotics were re-broadened from ancef to cefepime/flagyll per ID recs to cover possible gram negative wound infection. WBC downtrended, fevers resolved and hypotension improved with gradual weaning of pressor requirements. Weaned off levophed 2/8 overnight, weaning off droxidopa. O2 requirements weaned to RA. At this time patient is stable for transfer to medicine floor.        ASSESSMENT & PLAN:   67 yo F with hx TBI (nonverbal, bedbound/contracted at baseline) s/p PEG last admission due to recurrent hypernatremia and chronic malnutrition, CVA, hypothyroidism, L breast cancer (s/p mastectomy tissue expander still in place ) who presented for decreased responsiveness and hypotension, found to have sepsis with MSSA bacteremia, likely source sacral ulcer w/ c/f possible osteomyelitis. Now being transferred to MICU for hypotension 2/2 likely septic shock, requiring pressors.     ======== Neuro =================  - AOx0, nonverbal c/w baseline mental status  - home sertraline 25 mg QD    ========Cardiovascular============  # shock, likely septic ISO sacral decub ulcer  - home med: midodrine 5 mg q8h  - now off levo  - d/c'd midodrine due to bradycardia, now restarted at 10 mg q8h as HRs normalized. Continue current dosing, plan to wean midodrine back to home dose after weaning droxidopa  - droxidopa - decrease to 300 mg q8h.  - MAP goal >60  - sepsis workup/tx as outlined in ID plan  - stop hydrocortisone     #QT prolongation  - QTc 561, 470s on repeat  - K>4, Mg>2  - avoid QT prolonging agents    ==========Respiratory===============  - basal consolidation seen on POCUS, aspiration PNA vs atelectasis  - on empiric cefepime/flagyll  - on RA   - CTA negative for PE  - wean O2 as tolerated   - MRSA swab negative      ===========GI/nutrition============  > s/p PEG  - diet NPO w/ TFs    > constipation, chronic  - cont miralax, senna    ===========RENAL/==============  - renal function at baseline, monitor for JAVIER ISO hemodynamic instability  - strict I/Os  - passed TOV    #hypernatremia  - free water 200 cc q6h   - trend, adjust free water as needed     ========== SKIN =================  - extensive lumbar/sacral/gluteal unstageable pressure wounds   - MRI spine with prominent sacral decubitus ulcer extending to the coccygeal tip without evidence of underlying osteomyelitis or discrete fluid collections in the surrounding soft tissues  - blood cultures growing MSSA 1/31, subsequent cultures NGTD   - wound care following, appreciate recs  - broadened abx from cefazolin to cefepime/flagyll per ID recs     =========== ID ==============  > sepsis  > MSSA bacteremia  > sacral wound, likely source of sepsis/bacteremia  - WBC 15k, hypotensive 2/2 likely septic shock- WBC now downtrending since broadening abx  - ABG lactate 2.0  - broadened abx from cefazolin to cefepime/flagyll per ID recs 2/5  - repeat blood cultures negative, clear as of 1/1   - unable to undergo TTE due to contractures   - ID following, f/u recs   - f/u cefepime trough (per pharmacy request)    ========== ENDOCRINE ===========  > hypothyroidism  - continue levothyroxine     ==========Heme/onc/DVT========  # hx breast cancer s/p mastectomy  - continue tamoxifen    # DVT ppx  - heparin SQ     #Ethics  - code status: FULL     FOR FOLLOW UP:  [ ] wound care recs  [ ] ID recs re abx course  [ ] cefepime trough

## 2024-02-07 NOTE — PROGRESS NOTE ADULT - SUBJECTIVE AND OBJECTIVE BOX
Gaby Daniel, PGY-1  ICU Progress Note    Patient is a 68y old  Female who presents with a chief complaint of Sepsis 2/2 osteomyelitis (2024 07:17)    SUBJECTIVE / OVERNIGHT EVENTS:  - off levo since midnight  - on midodrine & droxidopa  - tolerating TFs  - afebrile, HDS, on RA  - WBC downtrending    MEDICATIONS  (STANDING):  artificial  tears Solution 1 Drop(s) Both EYES two times a day  ascorbic acid 500 milliGRAM(s) Oral daily  cefepime   IVPB 1000 milliGRAM(s) IV Intermittent every 8 hours  chlorhexidine 2% Cloths 1 Application(s) Topical <User Schedule>  collagenase Ointment 1 Application(s) Topical two times a day  Dakins Solution - 1/4 Strength 1 Application(s) Topical every 12 hours  droxidopa 400 milliGRAM(s) Oral every 8 hours  folic acid 1 milliGRAM(s) Oral daily  heparin   Injectable 5000 Unit(s) SubCutaneous every 12 hours  hydrocortisone sodium succinate Injectable 25 milliGRAM(s) IV Push every 12 hours  insulin lispro (ADMELOG) corrective regimen sliding scale   SubCutaneous every 6 hours  levothyroxine Injectable 80 MICROGram(s) IV Push at bedtime  metroNIDAZOLE  IVPB 500 milliGRAM(s) IV Intermittent every 12 hours  midodrine 10 milliGRAM(s) Oral every 8 hours  multivitamin 1 Tablet(s) Oral daily  norepinephrine Infusion 0.05 MICROgram(s)/kG/Min (3.4 mL/Hr) IV Continuous <Continuous>  polyethylene glycol 3350 17 Gram(s) Oral daily  senna 2 Tablet(s) Oral at bedtime  sertraline 25 milliGRAM(s) Oral daily  tamoxifen 20 milliGRAM(s) Oral daily  thiamine 100 milliGRAM(s) Oral daily    MEDICATIONS  (PRN):  HYDROmorphone  Injectable 0.2 milliGRAM(s) IV Push every 8 hours PRN dressing change    Allergies    Tapazole (Hives)    Intolerances        Vital Signs Last 24 Hrs  T(C): 37 (2024 08:00), Max: 37.3 (2024 00:00)  T(F): 98.6 (2024 08:00), Max: 99.1 (2024 00:00)  HR: 82 (2024 07:00) (51 - 89)  BP: 97/63 (2024 07:00) (77/48 - 122/72)  BP(mean): 75 (2024 07:00) (58 - 92)  RR: 18 (2024 07:00) (10 - 28)  SpO2: 93% (2024 07:00) (93% - 100%)    Parameters below as of 2024 22:30  Patient On (Oxygen Delivery Method): room air      Daily     Daily Weight in k.7 (2024 04:00)  I&O's Summary    2024 07:01  -  2024 07:00  --------------------------------------------------------  IN: 2480.7 mL / OUT: 2050 mL / NET: 430.7 mL    Physical Exam  General: A&Ox0, NAD, opens eyes to voice and squeezes hands to command  HEENT: mucous membranes slightly dry  Neck: supple  Chest/Lungs: CTAB, no WRR  Heart: RRR, no murmurs. No cyanosis  Abdomen: soft, nontender, nondistended   Extremities: contracted c/w baseline, no edema, palpable pulses  Skin: multiple pressure injuries to sacrum/right hip/buttocks, left shoulder/hip, lumbar spine, L foot   Neuro: awake, nonverbal (c/w baseline) but opens eyes and squeezes hands to command      DIAGNOSTICS:                         8.7    9.33  )-----------( 248      ( 2024 00:20 )             30.3     Hgb Trend: 8.7<--, 8.6<--, 8.8<--, 8.8<--, 8.0<--  02-07    142  |  106  |  17  ----------------------------<  161<H>  3.8   |  26  |  <0.30<L>    Ca    8.1<L>      2024 00:20  Phos  2.1     02-07  Mg     2.3     -07    TPro  5.4<L>  /  Alb  2.9<L>  /  TBili  0.4  /  DBili  x   /  AST  13  /  ALT  10  /  AlkPhos  82  -    CAPILLARY BLOOD GLUCOSE      POCT Blood Glucose.: 120 mg/dL (2024 06:03)  POCT Blood Glucose.: 121 mg/dL (2024 17:10)  POCT Blood Glucose.: 135 mg/dL (2024 11:28)    Creatinine Trend: <0.30<--, <0.30<--, <0.30<--, <0.30<--, <0.30<--, <0.30<--  LIVER FUNCTIONS - ( 2024 00:20 )  Alb: 2.9 g/dL / Pro: 5.4 g/dL / ALK PHOS: 82 U/L / ALT: 10 U/L / AST: 13 U/L / GGT: x                 Urinalysis Basic - ( 2024 00:20 )    Color: x / Appearance: x / SG: x / pH: x  Gluc: 161 mg/dL / Ketone: x  / Bili: x / Urobili: x   Blood: x / Protein: x / Nitrite: x   Leuk Esterase: x / RBC: x / WBC x   Sq Epi: x / Non Sq Epi: x / Bacteria: x

## 2024-02-07 NOTE — PROGRESS NOTE ADULT - ASSESSMENT
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 Admitted for septic shock likely 2/2 to wound infection, complicated by bacteremia ,

## 2024-02-08 LAB
ANION GAP SERPL CALC-SCNC: 12 MMOL/L — SIGNIFICANT CHANGE UP (ref 5–17)
BASOPHILS # BLD AUTO: 0.01 K/UL — SIGNIFICANT CHANGE UP (ref 0–0.2)
BASOPHILS NFR BLD AUTO: 0.1 % — SIGNIFICANT CHANGE UP (ref 0–2)
BUN SERPL-MCNC: 17 MG/DL — SIGNIFICANT CHANGE UP (ref 7–23)
CALCIUM SERPL-MCNC: 7.8 MG/DL — LOW (ref 8.4–10.5)
CHLORIDE SERPL-SCNC: 102 MMOL/L — SIGNIFICANT CHANGE UP (ref 96–108)
CO2 SERPL-SCNC: 25 MMOL/L — SIGNIFICANT CHANGE UP (ref 22–31)
CREAT SERPL-MCNC: <0.3 MG/DL — LOW (ref 0.5–1.3)
EGFR: 114 ML/MIN/1.73M2 — SIGNIFICANT CHANGE UP
EOSINOPHIL # BLD AUTO: 0.06 K/UL — SIGNIFICANT CHANGE UP (ref 0–0.5)
EOSINOPHIL NFR BLD AUTO: 0.7 % — SIGNIFICANT CHANGE UP (ref 0–6)
GLUCOSE BLDC GLUCOMTR-MCNC: 127 MG/DL — HIGH (ref 70–99)
GLUCOSE BLDC GLUCOMTR-MCNC: 131 MG/DL — HIGH (ref 70–99)
GLUCOSE BLDC GLUCOMTR-MCNC: 131 MG/DL — HIGH (ref 70–99)
GLUCOSE BLDC GLUCOMTR-MCNC: 136 MG/DL — HIGH (ref 70–99)
GLUCOSE SERPL-MCNC: 111 MG/DL — HIGH (ref 70–99)
HCT VFR BLD CALC: 28.7 % — LOW (ref 34.5–45)
HGB BLD-MCNC: 8.4 G/DL — LOW (ref 11.5–15.5)
IMM GRANULOCYTES NFR BLD AUTO: 0.8 % — SIGNIFICANT CHANGE UP (ref 0–0.9)
LYMPHOCYTES # BLD AUTO: 1.17 K/UL — SIGNIFICANT CHANGE UP (ref 1–3.3)
LYMPHOCYTES # BLD AUTO: 13.6 % — SIGNIFICANT CHANGE UP (ref 13–44)
MAGNESIUM SERPL-MCNC: 2.2 MG/DL — SIGNIFICANT CHANGE UP (ref 1.6–2.6)
MCHC RBC-ENTMCNC: 27.3 PG — SIGNIFICANT CHANGE UP (ref 27–34)
MCHC RBC-ENTMCNC: 29.3 GM/DL — LOW (ref 32–36)
MCV RBC AUTO: 93.2 FL — SIGNIFICANT CHANGE UP (ref 80–100)
MONOCYTES # BLD AUTO: 0.4 K/UL — SIGNIFICANT CHANGE UP (ref 0–0.9)
MONOCYTES NFR BLD AUTO: 4.6 % — SIGNIFICANT CHANGE UP (ref 2–14)
NEUTROPHILS # BLD AUTO: 6.92 K/UL — SIGNIFICANT CHANGE UP (ref 1.8–7.4)
NEUTROPHILS NFR BLD AUTO: 80.2 % — HIGH (ref 43–77)
NRBC # BLD: 0 /100 WBCS — SIGNIFICANT CHANGE UP (ref 0–0)
PHOSPHATE SERPL-MCNC: 2.3 MG/DL — LOW (ref 2.5–4.5)
PLATELET # BLD AUTO: 275 K/UL — SIGNIFICANT CHANGE UP (ref 150–400)
POTASSIUM SERPL-MCNC: 4.1 MMOL/L — SIGNIFICANT CHANGE UP (ref 3.5–5.3)
POTASSIUM SERPL-SCNC: 4.1 MMOL/L — SIGNIFICANT CHANGE UP (ref 3.5–5.3)
RBC # BLD: 3.08 M/UL — LOW (ref 3.8–5.2)
RBC # FLD: 18.4 % — HIGH (ref 10.3–14.5)
SODIUM SERPL-SCNC: 139 MMOL/L — SIGNIFICANT CHANGE UP (ref 135–145)
VIT B12 SERPL-MCNC: 524 PG/ML — SIGNIFICANT CHANGE UP (ref 232–1245)
WBC # BLD: 8.63 K/UL — SIGNIFICANT CHANGE UP (ref 3.8–10.5)
WBC # FLD AUTO: 8.63 K/UL — SIGNIFICANT CHANGE UP (ref 3.8–10.5)

## 2024-02-08 PROCEDURE — 99232 SBSQ HOSP IP/OBS MODERATE 35: CPT

## 2024-02-08 RX ORDER — SODIUM,POTASSIUM PHOSPHATES 278-250MG
1 POWDER IN PACKET (EA) ORAL EVERY 4 HOURS
Refills: 0 | Status: COMPLETED | OUTPATIENT
Start: 2024-02-08 | End: 2024-02-08

## 2024-02-08 RX ORDER — SODIUM CHLORIDE 9 MG/ML
250 INJECTION INTRAMUSCULAR; INTRAVENOUS; SUBCUTANEOUS ONCE
Refills: 0 | Status: COMPLETED | OUTPATIENT
Start: 2024-02-08 | End: 2024-02-08

## 2024-02-08 RX ORDER — MIDODRINE HYDROCHLORIDE 2.5 MG/1
10 TABLET ORAL ONCE
Refills: 0 | Status: COMPLETED | OUTPATIENT
Start: 2024-02-08 | End: 2024-02-08

## 2024-02-08 RX ADMIN — HEPARIN SODIUM 5000 UNIT(S): 5000 INJECTION INTRAVENOUS; SUBCUTANEOUS at 17:25

## 2024-02-08 RX ADMIN — Medication 1 DROP(S): at 22:20

## 2024-02-08 RX ADMIN — Medication 1 APPLICATION(S): at 06:22

## 2024-02-08 RX ADMIN — Medication 1 PACKET(S): at 23:00

## 2024-02-08 RX ADMIN — Medication 1 APPLICATION(S): at 06:23

## 2024-02-08 RX ADMIN — Medication 1 TABLET(S): at 12:15

## 2024-02-08 RX ADMIN — Medication 100 MILLIGRAM(S): at 06:22

## 2024-02-08 RX ADMIN — DROXIDOPA 300 MILLIGRAM(S): 100 CAPSULE ORAL at 13:07

## 2024-02-08 RX ADMIN — Medication 100 MILLIGRAM(S): at 17:25

## 2024-02-08 RX ADMIN — CEFEPIME 100 MILLIGRAM(S): 1 INJECTION, POWDER, FOR SOLUTION INTRAMUSCULAR; INTRAVENOUS at 13:07

## 2024-02-08 RX ADMIN — Medication 1 APPLICATION(S): at 17:25

## 2024-02-08 RX ADMIN — CEFEPIME 100 MILLIGRAM(S): 1 INJECTION, POWDER, FOR SOLUTION INTRAMUSCULAR; INTRAVENOUS at 22:18

## 2024-02-08 RX ADMIN — DROXIDOPA 300 MILLIGRAM(S): 100 CAPSULE ORAL at 22:18

## 2024-02-08 RX ADMIN — Medication 80 MICROGRAM(S): at 22:59

## 2024-02-08 RX ADMIN — Medication 1 DROP(S): at 09:04

## 2024-02-08 RX ADMIN — Medication 500 MILLIGRAM(S): at 12:15

## 2024-02-08 RX ADMIN — SERTRALINE 25 MILLIGRAM(S): 25 TABLET, FILM COATED ORAL at 12:17

## 2024-02-08 RX ADMIN — SODIUM CHLORIDE 250 MILLILITER(S): 9 INJECTION INTRAMUSCULAR; INTRAVENOUS; SUBCUTANEOUS at 04:18

## 2024-02-08 RX ADMIN — CEFEPIME 100 MILLIGRAM(S): 1 INJECTION, POWDER, FOR SOLUTION INTRAMUSCULAR; INTRAVENOUS at 06:23

## 2024-02-08 RX ADMIN — POLYETHYLENE GLYCOL 3350 17 GRAM(S): 17 POWDER, FOR SOLUTION ORAL at 12:16

## 2024-02-08 RX ADMIN — Medication 100 MILLIGRAM(S): at 12:15

## 2024-02-08 RX ADMIN — DROXIDOPA 300 MILLIGRAM(S): 100 CAPSULE ORAL at 06:23

## 2024-02-08 RX ADMIN — MIDODRINE HYDROCHLORIDE 10 MILLIGRAM(S): 2.5 TABLET ORAL at 22:18

## 2024-02-08 RX ADMIN — MIDODRINE HYDROCHLORIDE 10 MILLIGRAM(S): 2.5 TABLET ORAL at 13:07

## 2024-02-08 RX ADMIN — SENNA PLUS 2 TABLET(S): 8.6 TABLET ORAL at 22:20

## 2024-02-08 RX ADMIN — Medication 1 MILLIGRAM(S): at 12:15

## 2024-02-08 RX ADMIN — Medication 1 PACKET(S): at 17:25

## 2024-02-08 RX ADMIN — MIDODRINE HYDROCHLORIDE 10 MILLIGRAM(S): 2.5 TABLET ORAL at 06:23

## 2024-02-08 RX ADMIN — TAMOXIFEN CITRATE 20 MILLIGRAM(S): 20 TABLET, FILM COATED ORAL at 12:17

## 2024-02-08 RX ADMIN — MIDODRINE HYDROCHLORIDE 10 MILLIGRAM(S): 2.5 TABLET ORAL at 01:10

## 2024-02-08 RX ADMIN — HEPARIN SODIUM 5000 UNIT(S): 5000 INJECTION INTRAVENOUS; SUBCUTANEOUS at 06:22

## 2024-02-08 NOTE — PROGRESS NOTE ADULT - ASSESSMENT
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 Admitted for septic shock likely 2/2 to wound infection, complicated by bacteremia.

## 2024-02-08 NOTE — CHART NOTE - NSCHARTNOTEFT_GEN_A_CORE
MEDICINE PA EPISODIC NOTE    /Informed by nurse patient with manual BP 97/62. Saw and evaluated patient at bedside. Patient A&Ox4 - denies lightheadedness, dizziness, chest pain, palpitations, SOB, N/V/D, blurred vision.        Plan:  #Hypotension  Encourage patient to increase fluid intake.  250 cc NS bolus  Will continue to monitor patient closely.    Steven Monteiro PA-C  Dept of Medicine  [employer]

## 2024-02-08 NOTE — PROGRESS NOTE ADULT - SUBJECTIVE AND OBJECTIVE BOX
[facility] Division of Hospital Medicine  Gabo Gomez MD  Available via MS Teams    SUBJECTIVE / OVERNIGHT EVENTS: Patient seen and examined at bedside. Unable to participate in ROS due to underlying neurologic condition.     MEDICATIONS  (STANDING):  artificial  tears Solution 1 Drop(s) Both EYES two times a day  ascorbic acid 500 milliGRAM(s) Oral daily  cefepime   IVPB 1000 milliGRAM(s) IV Intermittent every 8 hours  chlorhexidine 2% Cloths 1 Application(s) Topical <User Schedule>  collagenase Ointment 1 Application(s) Topical two times a day  Dakins Solution - 1/4 Strength 1 Application(s) Topical every 12 hours  droxidopa 300 milliGRAM(s) Oral every 8 hours  folic acid 1 milliGRAM(s) Oral daily  heparin   Injectable 5000 Unit(s) SubCutaneous every 12 hours  insulin lispro (ADMELOG) corrective regimen sliding scale   SubCutaneous every 6 hours  levothyroxine Injectable 80 MICROGram(s) IV Push at bedtime  metroNIDAZOLE  IVPB 500 milliGRAM(s) IV Intermittent every 12 hours  midodrine 10 milliGRAM(s) Oral every 8 hours  multivitamin 1 Tablet(s) Oral daily  polyethylene glycol 3350 17 Gram(s) Oral daily  senna 2 Tablet(s) Oral at bedtime  sertraline 25 milliGRAM(s) Oral daily  tamoxifen 20 milliGRAM(s) Oral daily  thiamine 100 milliGRAM(s) Oral daily    MEDICATIONS  (PRN):  HYDROmorphone  Injectable 0.2 milliGRAM(s) IV Push every 8 hours PRN dressing change      I&O's Summary    07 Feb 2024 07:01  -  08 Feb 2024 07:00  --------------------------------------------------------  IN: 1035 mL / OUT: 800 mL / NET: 235 mL        PHYSICAL EXAM:  Vital Signs Last 24 Hrs  T(C): 36.7 (08 Feb 2024 04:09), Max: 37 (07 Feb 2024 15:00)  T(F): 98.1 (08 Feb 2024 04:09), Max: 98.6 (07 Feb 2024 15:00)  HR: 57 (08 Feb 2024 04:09) (56 - 91)  BP: 109/68 (08 Feb 2024 04:09) (93/63 - 124/66)  BP(mean): 83 (07 Feb 2024 15:00) (83 - 88)  RR: 19 (08 Feb 2024 04:09) (17 - 23)  SpO2: 96% (08 Feb 2024 04:09) (95% - 97%)    Parameters below as of 08 Feb 2024 04:09  Patient On (Oxygen Delivery Method): room air     CONSTITUTIONAL: NAD, well-groomed  EYES: PERRLA; conjunctiva and sclera clear  ENMT: Moist oral mucosa, no pharyngeal injection or exudates;  NECK: Supple, no palpable masses; no thyromegaly  RESPIRATORY: Normal respiratory effort; lungs are clear to auscultation bilaterally  CARDIOVASCULAR: normal S1 and S2, no murmur/rub/gallop; No lower extremity edema  ABDOMEN: Nontender to palpation, normoactive bowel sounds, no rebound/guarding; No hepatosplenomegaly  MUSCULOSKELETAL:  no clubbing or cyanosis of digits; no joint swelling or tenderness to palpation  PSYCH: Nonverbal   NEUROLOGY: Nonresponsive to verbal stimuli, withdraws but does not localize noxious stimuli   SKIN: No rashes; no palpable lesions    LABS:                        8.4    8.63  )-----------( 275      ( 08 Feb 2024 07:37 )             28.7     02-08    139  |  102  |  17  ----------------------------<  111<H>  4.1   |  25  |  <0.30<L>    Ca    7.8<L>      08 Feb 2024 07:09  Phos  2.3     02-08  Mg     2.2     02-08    TPro  5.4<L>  /  Alb  2.9<L>  /  TBili  0.4  /  DBili  x   /  AST  13  /  ALT  10  /  AlkPhos  82  02-07          Urinalysis Basic - ( 08 Feb 2024 07:09 )    Color: x / Appearance: x / SG: x / pH: x  Gluc: 111 mg/dL / Ketone: x  / Bili: x / Urobili: x   Blood: x / Protein: x / Nitrite: x   Leuk Esterase: x / RBC: x / WBC x   Sq Epi: x / Non Sq Epi: x / Bacteria: x    COVID-19 PCR: NotDetec (14 Jan 2024 21:34)  SARS-CoV-2: NotDetec (29 Dec 2023 03:06)  SARS-CoV-2: NotDetec (28 Dec 2023 09:19)

## 2024-02-08 NOTE — CONSULT NOTE ADULT - SUBJECTIVE AND OBJECTIVE BOX
DATE OF SERVICE: 02-08-24 @ 15:41    CHIEF COMPLAINT:Patient is a 68y old  Female who presents with a chief complaint of Sepsis 2/2 osteomyelitis (08 Feb 2024 12:41)      HISTORY OF PRESENT ILLNESS:HPI:  Pt non-verbal at baseline. Hx obtained via chart review and .    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 Found to have leukocytosis, tachycardia and hypotension here as well. Difficult to assess, contracted and non-verbal.    Spangler cathter placed in ED, POA.    Of note, patient was recently discharged after hospitalization for UTI and hypernatremia complicated by AHRF 2/2 to aspiration pneumonia. She is s/p PEG placement on 1/11/24. 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      C Section      Abnormality, eye  h/o left eye vitrectomy      S/P D&C (status post dilation and curettage)      H/O left mastectomy              MEDICATIONS:  droxidopa 300 milliGRAM(s) Oral every 8 hours  heparin   Injectable 5000 Unit(s) SubCutaneous every 12 hours  midodrine 10 milliGRAM(s) Oral every 8 hours    cefepime   IVPB 1000 milliGRAM(s) IV Intermittent every 8 hours  metroNIDAZOLE  IVPB 500 milliGRAM(s) IV Intermittent every 12 hours      HYDROmorphone  Injectable 0.2 milliGRAM(s) IV Push every 8 hours PRN  sertraline 25 milliGRAM(s) Oral daily    polyethylene glycol 3350 17 Gram(s) Oral daily  senna 2 Tablet(s) Oral at bedtime    insulin lispro (ADMELOG) corrective regimen sliding scale   SubCutaneous every 6 hours  levothyroxine Injectable 80 MICROGram(s) IV Push at bedtime    artificial  tears Solution 1 Drop(s) Both EYES two times a day  ascorbic acid 500 milliGRAM(s) Oral daily  chlorhexidine 2% Cloths 1 Application(s) Topical <User Schedule>  collagenase Ointment 1 Application(s) Topical two times a day  Dakins Solution - 1/4 Strength 1 Application(s) Topical every 12 hours  folic acid 1 milliGRAM(s) Oral daily  multivitamin 1 Tablet(s) Oral daily  tamoxifen 20 milliGRAM(s) Oral daily  thiamine 100 milliGRAM(s) Oral daily      FAMILY HISTORY:  No pertinent family history in first degree relatives        Non-contributory    SOCIAL HISTORY:    [x] Tobacco  [x] Drugs  [x] Alcohol    Allergies    Tapazole (Hives)    Intolerances    	    REVIEW OF SYSTEMS:  CONSTITUTIONAL: No fever  EYES: No eye pain, visual disturbances, or discharge  ENMT:  No difficulty hearing, tinnitus  NECK: No pain or stiffness  RESPIRATORY: No cough, wheezing,  CARDIOVASCULAR: No chest pain, palpitations, passing out, dizziness, or leg swelling  GASTROINTESTINAL:  No nausea, vomiting, diarrhea or constipation. No melena.  GENITOURINARY: No dysuria, hematuria  NEUROLOGICAL: No stroke like symptoms  SKIN: No burning or lesions   ENDOCRINE: No heat or cold intolerance  MUSCULOSKELETAL: No joint pain or swelling  PSYCHIATRIC: No  anxiety, mood swings  HEME/LYMPH: No bleeding gums  ALLERGY AND IMMUNOLOGIC: No hives or eczema	    All other ROS negative    PHYSICAL EXAM:  T(C): 37 (02-08-24 @ 12:00), Max: 37 (02-08-24 @ 12:00)  HR: 92 (02-08-24 @ 12:00) (57 - 92)  BP: 105/70 (02-08-24 @ 12:00) (93/63 - 109/68)  RR: 20 (02-08-24 @ 12:00) (19 - 21)  SpO2: 95% (02-08-24 @ 12:00) (95% - 96%)  Wt(kg): --  I&O's Summary    07 Feb 2024 07:01  -  08 Feb 2024 07:00  --------------------------------------------------------  IN: 1035 mL / OUT: 800 mL / NET: 235 mL    08 Feb 2024 07:01  -  08 Feb 2024 15:41  --------------------------------------------------------  IN: 790 mL / OUT: 0 mL / NET: 790 mL        Appearance: Normal	  HEENT:   Normal oral mucosa, EOMI	  Cardiovascular:  S1 S2, No JVD,    Respiratory: Lungs clear to auscultation	  Psychiatry: Alert  Gastrointestinal:  Soft, Non-tender, + BS	  Skin: No rashes   Neurologic: Non-focal  Extremities:  No edema  Vascular: Peripheral pulses palpable    	    	  	  CARDIAC MARKERS:  Labs personally reviewed by me                                  8.4    8.63  )-----------( 275      ( 08 Feb 2024 07:37 )             28.7     02-08    139  |  102  |  17  ----------------------------<  111<H>  4.1   |  25  |  <0.30<L>    Ca    7.8<L>      08 Feb 2024 07:09  Phos  2.3     02-08  Mg     2.2     02-08    TPro  5.4<L>  /  Alb  2.9<L>  /  TBili  0.4  /  DBili  x   /  AST  13  /  ALT  10  /  AlkPhos  82  02-07          EKG: Personally reviewed by me - SB 54  Radiology: Personally reviewed by me -   Xray Chest 1 View- PORTABLE-Urgent   FINDINGS:  Limited by rotation.  Surgical clips overlying the left axilla.  The heart is not accurately assessed in this AP projection.  No focal consolidation.  There is no pneumothorax orpleural effusion.  No acute abnormalities in the visualized osseous structures. Chronic rib   fracture deformities.  IMPRESSION:  No focal consolidation.      Assessment /Plan:   67 yo F with hx TBI (nonverbal, bedbound/contracted at baseline) s/p PEG last admission due to recurrent hypernatremia and chronic malnutrition, CVA, hypothyroidism, L breast cancer (s/p mastectomy tissue expander still in place ) who presented for decreased responsiveness and hypotension, found to have sepsis with MSSA bacteremia, likely source sacral ulcer w/ c/f possible osteomyelitis. Subsequently required ICU care given hypotension 2/2 likely septic shock, requiring pressors.       1. Problem/Plan:  Problem: Hypotension   Septic Shock 2/2 Bacteremia with MSSA.   - Previously in MICU requiring pressors, now off levo   - Recommend initiating Midodrine 5mg PO q8h  - droxidopa - decrease to 300 mg q8h      2. Problem/Plan:  Problem: Hypothyroidism.   - Continue levothyroxine  - TSH wnl.    3. Problem/Plan:  Problem: DVT ppx  - Continue subq Heparin     Differential diagnosis and plan of care discussed with patient after the evaluation. Counseling on diet, nutritional counseling, weight management, exercise and medication compliance was done.   Advanced care planning/advanced directives discussed with patient/family. DNR status including forceful chest compressions to attempt to restart the heart, ventilator support/artificial breathing, electric shock, artificial nutrition, health care proxy, Molst form all discussed with pt. Pt wishes to consider. More than fifteen minutes spent on discussing advanced directives.     MARK Cardoso DO [facility]  Cardiovascular Medicine  [address] [place] [address], [address]  Available for call or text via [employer] TE[employer]  Office [phone]   DATE OF SERVICE: 02-08-24 @ 15:41    CHIEF COMPLAINT:Patient is a 68y old  Female who presents with a chief complaint of Sepsis 2/2 osteomyelitis (08 Feb 2024 12:41)      HISTORY OF PRESENT ILLNESS:HPI:  Pt non-verbal at baseline. Hx obtained via chart review and .    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 Found to have leukocytosis, tachycardia and hypotension here as well. Difficult to assess, contracted and non-verbal.    Spangler cathter placed in ED, POA.    Of note, patient was recently discharged after hospitalization for UTI and hypernatremia complicated by AHRF 2/2 to aspiration pneumonia. She is s/p PEG placement on 1/11/24. 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      C Section      Abnormality, eye  h/o left eye vitrectomy      S/P D&C (status post dilation and curettage)      H/O left mastectomy              MEDICATIONS:  droxidopa 300 milliGRAM(s) Oral every 8 hours  heparin   Injectable 5000 Unit(s) SubCutaneous every 12 hours  midodrine 10 milliGRAM(s) Oral every 8 hours    cefepime   IVPB 1000 milliGRAM(s) IV Intermittent every 8 hours  metroNIDAZOLE  IVPB 500 milliGRAM(s) IV Intermittent every 12 hours      HYDROmorphone  Injectable 0.2 milliGRAM(s) IV Push every 8 hours PRN  sertraline 25 milliGRAM(s) Oral daily    polyethylene glycol 3350 17 Gram(s) Oral daily  senna 2 Tablet(s) Oral at bedtime    insulin lispro (ADMELOG) corrective regimen sliding scale   SubCutaneous every 6 hours  levothyroxine Injectable 80 MICROGram(s) IV Push at bedtime    artificial  tears Solution 1 Drop(s) Both EYES two times a day  ascorbic acid 500 milliGRAM(s) Oral daily  chlorhexidine 2% Cloths 1 Application(s) Topical <User Schedule>  collagenase Ointment 1 Application(s) Topical two times a day  Dakins Solution - 1/4 Strength 1 Application(s) Topical every 12 hours  folic acid 1 milliGRAM(s) Oral daily  multivitamin 1 Tablet(s) Oral daily  tamoxifen 20 milliGRAM(s) Oral daily  thiamine 100 milliGRAM(s) Oral daily      FAMILY HISTORY:  No pertinent family history in first degree relatives        Non-contributory    SOCIAL HISTORY:    [x] Tobacco  [x] Drugs  [x] Alcohol    Allergies    Tapazole (Hives)    Intolerances    	    REVIEW OF SYSTEMS:  CONSTITUTIONAL: No fever  EYES: No eye pain, visual disturbances, or discharge  ENMT:  No difficulty hearing, tinnitus  NECK: No pain or stiffness  RESPIRATORY: No cough, wheezing,  CARDIOVASCULAR: No chest pain, palpitations, passing out, dizziness, or leg swelling  GASTROINTESTINAL:  No nausea, vomiting, diarrhea or constipation. No melena.  GENITOURINARY: No dysuria, hematuria  NEUROLOGICAL: No stroke like symptoms  SKIN: No burning or lesions   ENDOCRINE: No heat or cold intolerance  MUSCULOSKELETAL: No joint pain or swelling  PSYCHIATRIC: No  anxiety, mood swings  HEME/LYMPH: No bleeding gums  ALLERGY AND IMMUNOLOGIC: No hives or eczema	    All other ROS negative    PHYSICAL EXAM:  T(C): 37 (02-08-24 @ 12:00), Max: 37 (02-08-24 @ 12:00)  HR: 92 (02-08-24 @ 12:00) (57 - 92)  BP: 105/70 (02-08-24 @ 12:00) (93/63 - 109/68)  RR: 20 (02-08-24 @ 12:00) (19 - 21)  SpO2: 95% (02-08-24 @ 12:00) (95% - 96%)  Wt(kg): --  I&O's Summary    07 Feb 2024 07:01  -  08 Feb 2024 07:00  --------------------------------------------------------  IN: 1035 mL / OUT: 800 mL / NET: 235 mL    08 Feb 2024 07:01  -  08 Feb 2024 15:41  --------------------------------------------------------  IN: 790 mL / OUT: 0 mL / NET: 790 mL        Appearance: Normal	  HEENT:   Normal oral mucosa, EOMI	  Cardiovascular:  S1 S2, No JVD,    Respiratory: Lungs clear to auscultation	  Psychiatry: Alert  Gastrointestinal:  Soft, Non-tender, + BS	  Skin: No rashes   Neurologic: Non-focal  Extremities:  No edema  Vascular: Peripheral pulses palpable    	    	  	  CARDIAC MARKERS:  Labs personally reviewed by me                                  8.4    8.63  )-----------( 275      ( 08 Feb 2024 07:37 )             28.7     02-08    139  |  102  |  17  ----------------------------<  111<H>  4.1   |  25  |  <0.30<L>    Ca    7.8<L>      08 Feb 2024 07:09  Phos  2.3     02-08  Mg     2.2     02-08    TPro  5.4<L>  /  Alb  2.9<L>  /  TBili  0.4  /  DBili  x   /  AST  13  /  ALT  10  /  AlkPhos  82  02-07          EKG: Personally reviewed by me - SB 54  Radiology: Personally reviewed by me -   Xray Chest 1 View- PORTABLE-Urgent   FINDINGS:  Limited by rotation.  Surgical clips overlying the left axilla.  The heart is not accurately assessed in this AP projection.  No focal consolidation.  There is no pneumothorax orpleural effusion.  No acute abnormalities in the visualized osseous structures. Chronic rib   fracture deformities.  IMPRESSION:  No focal consolidation.      Assessment /Plan:   69 yo F with hx TBI (nonverbal, bedbound/contracted at baseline) s/p PEG last admission due to recurrent hypernatremia and chronic malnutrition, CVA, hypothyroidism, L breast cancer (s/p mastectomy tissue expander still in place ) who presented for decreased responsiveness and hypotension, found to have sepsis with MSSA bacteremia, likely source sacral ulcer w/ c/f possible osteomyelitis. Subsequently required ICU care given hypotension 2/2 likely septic shock, requiring pressors.       1. Problem/Plan:  Problem: Hypotension   Septic Shock 2/2 Bacteremia with MSSA.   - Previously in MICU requiring pressors, now off levo   - Recommend initiating Midodrine 10mg PO q8h  - droxidopa - decrease to 300 mg q8h    2. Problem/Plan:  Problem: Hypothyroidism.   - Continue levothyroxine  - TSH wnl.    3. Problem/Plan:  Problem: DVT ppx  - Continue subq Heparin           Differential diagnosis and plan of care discussed with patient after the evaluation. Counseling on diet, nutritional counseling, weight management, exercise and medication compliance was done.   Advanced care planning/advanced directives discussed with patient/family. DNR status including forceful chest compressions to attempt to restart the heart, ventilator support/artificial breathing, electric shock, artificial nutrition, health care proxy, Molst form all discussed with pt. Pt wishes to consider. More than fifteen minutes spent on discussing advanced directives.     MARK Cardoso DO [facility]  Cardiovascular Medicine  [address] [place] [address], [address]  Available for call or text via [employer] TE[employer]  Office [phone]

## 2024-02-09 LAB
ANION GAP SERPL CALC-SCNC: 11 MMOL/L — SIGNIFICANT CHANGE UP (ref 5–17)
BUN SERPL-MCNC: 16 MG/DL — SIGNIFICANT CHANGE UP (ref 7–23)
CALCIUM SERPL-MCNC: 8.3 MG/DL — LOW (ref 8.4–10.5)
CEFEPIME LEVEL RESULT: SIGNIFICANT CHANGE UP
CHLORIDE SERPL-SCNC: 106 MMOL/L — SIGNIFICANT CHANGE UP (ref 96–108)
CO2 SERPL-SCNC: 25 MMOL/L — SIGNIFICANT CHANGE UP (ref 22–31)
CREAT SERPL-MCNC: <0.3 MG/DL — LOW (ref 0.5–1.3)
EGFR: 114 ML/MIN/1.73M2 — SIGNIFICANT CHANGE UP
GLUCOSE BLDC GLUCOMTR-MCNC: 111 MG/DL — HIGH (ref 70–99)
GLUCOSE BLDC GLUCOMTR-MCNC: 123 MG/DL — HIGH (ref 70–99)
GLUCOSE BLDC GLUCOMTR-MCNC: 126 MG/DL — HIGH (ref 70–99)
GLUCOSE BLDC GLUCOMTR-MCNC: 155 MG/DL — HIGH (ref 70–99)
GLUCOSE SERPL-MCNC: 146 MG/DL — HIGH (ref 70–99)
HCT VFR BLD CALC: 29.8 % — LOW (ref 34.5–45)
HGB BLD-MCNC: 9 G/DL — LOW (ref 11.5–15.5)
MAGNESIUM SERPL-MCNC: 2.3 MG/DL — SIGNIFICANT CHANGE UP (ref 1.6–2.6)
MCHC RBC-ENTMCNC: 27.6 PG — SIGNIFICANT CHANGE UP (ref 27–34)
MCHC RBC-ENTMCNC: 30.2 GM/DL — LOW (ref 32–36)
MCV RBC AUTO: 91.4 FL — SIGNIFICANT CHANGE UP (ref 80–100)
MRSA PCR RESULT.: SIGNIFICANT CHANGE UP
NRBC # BLD: 0 /100 WBCS — SIGNIFICANT CHANGE UP (ref 0–0)
PHOSPHATE SERPL-MCNC: 2.5 MG/DL — SIGNIFICANT CHANGE UP (ref 2.5–4.5)
PLATELET # BLD AUTO: 260 K/UL — SIGNIFICANT CHANGE UP (ref 150–400)
POTASSIUM SERPL-MCNC: 4 MMOL/L — SIGNIFICANT CHANGE UP (ref 3.5–5.3)
POTASSIUM SERPL-SCNC: 4 MMOL/L — SIGNIFICANT CHANGE UP (ref 3.5–5.3)
RBC # BLD: 3.26 M/UL — LOW (ref 3.8–5.2)
RBC # FLD: 18.6 % — HIGH (ref 10.3–14.5)
S AUREUS DNA NOSE QL NAA+PROBE: DETECTED
SODIUM SERPL-SCNC: 142 MMOL/L — SIGNIFICANT CHANGE UP (ref 135–145)
WBC # BLD: 11.11 K/UL — HIGH (ref 3.8–10.5)
WBC # FLD AUTO: 11.11 K/UL — HIGH (ref 3.8–10.5)

## 2024-02-09 PROCEDURE — 99233 SBSQ HOSP IP/OBS HIGH 50: CPT

## 2024-02-09 RX ADMIN — MIDODRINE HYDROCHLORIDE 10 MILLIGRAM(S): 2.5 TABLET ORAL at 06:23

## 2024-02-09 RX ADMIN — Medication 1 MILLIGRAM(S): at 12:14

## 2024-02-09 RX ADMIN — Medication 1 TABLET(S): at 12:14

## 2024-02-09 RX ADMIN — SENNA PLUS 2 TABLET(S): 8.6 TABLET ORAL at 21:07

## 2024-02-09 RX ADMIN — Medication 1: at 06:21

## 2024-02-09 RX ADMIN — Medication 100 MILLIGRAM(S): at 06:23

## 2024-02-09 RX ADMIN — Medication 1 DROP(S): at 21:08

## 2024-02-09 RX ADMIN — TAMOXIFEN CITRATE 20 MILLIGRAM(S): 20 TABLET, FILM COATED ORAL at 12:14

## 2024-02-09 RX ADMIN — HEPARIN SODIUM 5000 UNIT(S): 5000 INJECTION INTRAVENOUS; SUBCUTANEOUS at 17:58

## 2024-02-09 RX ADMIN — DROXIDOPA 300 MILLIGRAM(S): 100 CAPSULE ORAL at 21:07

## 2024-02-09 RX ADMIN — POLYETHYLENE GLYCOL 3350 17 GRAM(S): 17 POWDER, FOR SOLUTION ORAL at 12:15

## 2024-02-09 RX ADMIN — Medication 1 DROP(S): at 12:11

## 2024-02-09 RX ADMIN — MIDODRINE HYDROCHLORIDE 10 MILLIGRAM(S): 2.5 TABLET ORAL at 21:07

## 2024-02-09 RX ADMIN — DROXIDOPA 300 MILLIGRAM(S): 100 CAPSULE ORAL at 14:22

## 2024-02-09 RX ADMIN — DROXIDOPA 300 MILLIGRAM(S): 100 CAPSULE ORAL at 06:23

## 2024-02-09 RX ADMIN — Medication 80 MICROGRAM(S): at 21:07

## 2024-02-09 RX ADMIN — Medication 500 MILLIGRAM(S): at 13:30

## 2024-02-09 RX ADMIN — Medication 1 APPLICATION(S): at 17:56

## 2024-02-09 RX ADMIN — CHLORHEXIDINE GLUCONATE 1 APPLICATION(S): 213 SOLUTION TOPICAL at 06:22

## 2024-02-09 RX ADMIN — HEPARIN SODIUM 5000 UNIT(S): 5000 INJECTION INTRAVENOUS; SUBCUTANEOUS at 06:22

## 2024-02-09 RX ADMIN — Medication 1 APPLICATION(S): at 06:21

## 2024-02-09 RX ADMIN — Medication 1 APPLICATION(S): at 17:53

## 2024-02-09 RX ADMIN — CEFEPIME 100 MILLIGRAM(S): 1 INJECTION, POWDER, FOR SOLUTION INTRAMUSCULAR; INTRAVENOUS at 14:22

## 2024-02-09 RX ADMIN — Medication 100 MILLIGRAM(S): at 12:15

## 2024-02-09 RX ADMIN — Medication 100 MILLIGRAM(S): at 17:58

## 2024-02-09 RX ADMIN — MIDODRINE HYDROCHLORIDE 10 MILLIGRAM(S): 2.5 TABLET ORAL at 14:22

## 2024-02-09 RX ADMIN — CEFEPIME 100 MILLIGRAM(S): 1 INJECTION, POWDER, FOR SOLUTION INTRAMUSCULAR; INTRAVENOUS at 06:23

## 2024-02-09 RX ADMIN — Medication 1 APPLICATION(S): at 06:22

## 2024-02-09 RX ADMIN — CEFEPIME 100 MILLIGRAM(S): 1 INJECTION, POWDER, FOR SOLUTION INTRAMUSCULAR; INTRAVENOUS at 21:07

## 2024-02-09 RX ADMIN — SERTRALINE 25 MILLIGRAM(S): 25 TABLET, FILM COATED ORAL at 12:15

## 2024-02-09 NOTE — PROGRESS NOTE ADULT - SUBJECTIVE AND OBJECTIVE BOX
[facility] Division of Hospital Medicine  Gabo Gomez MD  Available via MS Teams    SUBJECTIVE / OVERNIGHT EVENTS: Patient seen and examined at bedside, resting comfortably and in no acute distress. Unable to participate in review of systems.     MEDICATIONS  (STANDING):  artificial  tears Solution 1 Drop(s) Both EYES two times a day  ascorbic acid 500 milliGRAM(s) Oral daily  cefepime   IVPB 1000 milliGRAM(s) IV Intermittent every 8 hours  chlorhexidine 2% Cloths 1 Application(s) Topical <User Schedule>  collagenase Ointment 1 Application(s) Topical two times a day  Dakins Solution - 1/4 Strength 1 Application(s) Topical every 12 hours  droxidopa 300 milliGRAM(s) Oral every 8 hours  folic acid 1 milliGRAM(s) Oral daily  heparin   Injectable 5000 Unit(s) SubCutaneous every 12 hours  insulin lispro (ADMELOG) corrective regimen sliding scale   SubCutaneous every 6 hours  levothyroxine Injectable 80 MICROGram(s) IV Push at bedtime  metroNIDAZOLE  IVPB 500 milliGRAM(s) IV Intermittent every 12 hours  midodrine 10 milliGRAM(s) Oral every 8 hours  multivitamin 1 Tablet(s) Oral daily  polyethylene glycol 3350 17 Gram(s) Oral daily  senna 2 Tablet(s) Oral at bedtime  sertraline 25 milliGRAM(s) Oral daily  tamoxifen 20 milliGRAM(s) Oral daily  thiamine 100 milliGRAM(s) Oral daily    MEDICATIONS  (PRN):  HYDROmorphone  Injectable 0.2 milliGRAM(s) IV Push every 8 hours PRN dressing change    I&O's Summary    08 Feb 2024 07:01  -  09 Feb 2024 07:00  --------------------------------------------------------  IN: 1310 mL / OUT: 800 mL / NET: 510 mL    PHYSICAL EXAM:  Vital Signs Last 24 Hrs  T(C): 36.9 (09 Feb 2024 13:42), Max: 36.9 (09 Feb 2024 13:42)  T(F): 98.4 (09 Feb 2024 13:42), Max: 98.4 (09 Feb 2024 13:42)  HR: 72 (09 Feb 2024 13:42) (72 - 94)  BP: 105/53 (09 Feb 2024 13:42) (100/61 - 110/74)  RR: 18 (09 Feb 2024 13:42) (18 - 19)  SpO2: 95% (09 Feb 2024 13:42) (91% - 95%)    Parameters below as of 09 Feb 2024 13:42  Patient On (Oxygen Delivery Method): room air    CONSTITUTIONAL: NAD, cathectic   EYES: PERRLA; conjunctiva and sclera clear  ENMT: Moist oral mucosa, no pharyngeal injection or exudates; normal dentition  NECK: Supple, no palpable masses; no thyromegaly  RESPIRATORY: Normal respiratory effort; lungs are clear to auscultation bilaterally  CARDIOVASCULAR: normal S1 and S2, no murmur/rub/gallop; No lower extremity edema  ABDOMEN: Nontender to palpation, normoactive bowel sounds, PEG in place   MUSCULOSKELETAL:  no clubbing or cyanosis of digits; no joint swelling or tenderness to palpation  PSYCH: A+O to person, place, and time; affect appropriate  NEUROLOGY: CN 2-12 are intact and symmetric; no gross sensory deficits   SKIN: No rashes; no palpable lesions    LABS:             9.0    11.11 )-----------( 260      ( 09 Feb 2024 07:23 )             29.8     142  |  106  |  16  ----------------------------<  146<H>  4.0   |  25  |  <0.30<L>    Ca    8.3<L>      09 Feb 2024 07:22  Phos  2.5     02-09  Mg     2.3     02-09    Urinalysis Basic - ( 09 Feb 2024 07:22 )    Color: x / Appearance: x / SG: x / pH: x  Gluc: 146 mg/dL / Ketone: x  / Bili: x / Urobili: x   Blood: x / Protein: x / Nitrite: x   Leuk Esterase: x / RBC: x / WBC x   Sq Epi: x / Non Sq Epi: x / Bacteria: x    COVID-19 PCR: NotDetec (14 Jan 2024 21:34)  SARS-CoV-2: NotDetec (29 Dec 2023 03:06)  SARS-CoV-2: NotPending sale to [facility] (28 Dec 2023 09:19)

## 2024-02-09 NOTE — PROGRESS NOTE ADULT - SUBJECTIVE AND OBJECTIVE BOX
DATE OF SERVICE: 02-09-24 @ 14:43    Patient is a 68y old  Female who presents with a chief complaint of Sepsis 2/2 osteomyelitis (09 Feb 2024 14:02)      INTERVAL HISTORY: pt non verbal at baseline, family not at bedside.    REVIEW OF SYSTEMS: TERESA as noted above  CONSTITUTIONAL: No weakness  EYES/ENT: No visual changes;  No throat pain   NECK: No pain or stiffness  RESPIRATORY: No cough, wheezing; No shortness of breath  CARDIOVASCULAR: No chest pain or palpitations  GASTROINTESTINAL: No abdominal  pain. No nausea, vomiting, or hematemesis  GENITOURINARY: No dysuria, frequency or hematuria  NEUROLOGICAL: No stroke like symptoms  SKIN: No rashes    	  MEDICATIONS:  droxidopa 300 milliGRAM(s) Oral every 8 hours  midodrine 10 milliGRAM(s) Oral every 8 hours        PHYSICAL EXAM:  T(C): 36.9 (02-09-24 @ 13:42), Max: 36.9 (02-09-24 @ 13:42)  HR: 72 (02-09-24 @ 13:42) (72 - 94)  BP: 105/53 (02-09-24 @ 13:42) (100/61 - 110/74)  RR: 18 (02-09-24 @ 13:42) (18 - 19)  SpO2: 95% (02-09-24 @ 13:42) (91% - 95%)  Wt(kg): --  I&O's Summary    08 Feb 2024 07:01  -  09 Feb 2024 07:00  --------------------------------------------------------  IN: 1310 mL / OUT: 800 mL / NET: 510 mL          Appearance: In no distress	  HEENT:    PERRL, EOMI	  Cardiovascular:  S1 S2, No JVD  Respiratory: Lungs clear to auscultation	  Gastrointestinal:  Soft, Non-tender, + BS	  Vascularature:  No edema of LE  Psychiatric: Appropriate affect   Neuro: no acute focal deficits                               9.0    11.11 )-----------( 260      ( 09 Feb 2024 07:23 )             29.8     02-09    142  |  106  |  16  ----------------------------<  146<H>  4.0   |  25  |  <0.30<L>    Ca    8.3<L>      09 Feb 2024 07:22  Phos  2.5     02-09  Mg     2.3     02-09          Labs personally reviewed      ASSESSMENT/PLAN: 	    67 yo F with hx TBI (nonverbal, bedbound/contracted at baseline) s/p PEG last admission due to recurrent hypernatremia and chronic malnutrition, CVA, hypothyroidism, L breast cancer (s/p mastectomy tissue expander still in place ) who presented for decreased responsiveness and hypotension, found to have sepsis with MSSA bacteremia, likely source sacral ulcer w/ c/f possible osteomyelitis. Subsequently required ICU care given hypotension 2/2 likely septic shock, requiring pressors.       1. Problem/Plan:  Problem: Hypotension   Septic Shock 2/2 Bacteremia with MSSA.   - Previously in MICU requiring pressors, now off levo   -c/w Midodrine 10mg PO q8h  - c/w droxidopa - decreased to 300 mg q8h    2. Problem/Plan:  Problem: Hypothyroidism.   - Continue levothyroxine  - TSH wnl.    3. Problem/Plan:  Problem: DVT ppx  - Continue subq Heparin                 Iolani Behrbom, AG-NP   Rajinder Quinones DO [facility]  Cardiovascular Medicine  [address] [place] [address], [address]  Available through call or text on [employer]  Office: [phone]

## 2024-02-10 DIAGNOSIS — J96.01 ACUTE RESPIRATORY FAILURE WITH HYPOXIA: ICD-10-CM

## 2024-02-10 LAB
ANION GAP SERPL CALC-SCNC: 13 MMOL/L — SIGNIFICANT CHANGE UP (ref 5–17)
BUN SERPL-MCNC: 16 MG/DL — SIGNIFICANT CHANGE UP (ref 7–23)
CALCIUM SERPL-MCNC: 8.6 MG/DL — SIGNIFICANT CHANGE UP (ref 8.4–10.5)
CHLORIDE SERPL-SCNC: 107 MMOL/L — SIGNIFICANT CHANGE UP (ref 96–108)
CO2 SERPL-SCNC: 24 MMOL/L — SIGNIFICANT CHANGE UP (ref 22–31)
CORTICOSTEROID BINDING GLOBULIN RESULT: 1.6 MG/DL — LOW
CORTIS F/TOTAL MFR SERPL: 55 % — SIGNIFICANT CHANGE UP
CORTIS SERPL-MCNC: 24 UG/DL — HIGH
CORTISOL, FREE RESULT: 13 UG/DL — HIGH
CREAT SERPL-MCNC: <0.3 MG/DL — LOW (ref 0.5–1.3)
EGFR: 114 ML/MIN/1.73M2 — SIGNIFICANT CHANGE UP
GAS PNL BLDA: SIGNIFICANT CHANGE UP
GLUCOSE BLDC GLUCOMTR-MCNC: 108 MG/DL — HIGH (ref 70–99)
GLUCOSE BLDC GLUCOMTR-MCNC: 112 MG/DL — HIGH (ref 70–99)
GLUCOSE BLDC GLUCOMTR-MCNC: 132 MG/DL — HIGH (ref 70–99)
GLUCOSE BLDC GLUCOMTR-MCNC: 226 MG/DL — HIGH (ref 70–99)
GLUCOSE SERPL-MCNC: 122 MG/DL — HIGH (ref 70–99)
HCT VFR BLD CALC: 31.1 % — LOW (ref 34.5–45)
HGB BLD-MCNC: 9.1 G/DL — LOW (ref 11.5–15.5)
MCHC RBC-ENTMCNC: 27.2 PG — SIGNIFICANT CHANGE UP (ref 27–34)
MCHC RBC-ENTMCNC: 29.3 GM/DL — LOW (ref 32–36)
MCV RBC AUTO: 93.1 FL — SIGNIFICANT CHANGE UP (ref 80–100)
NRBC # BLD: 0 /100 WBCS — SIGNIFICANT CHANGE UP (ref 0–0)
PLATELET # BLD AUTO: 262 K/UL — SIGNIFICANT CHANGE UP (ref 150–400)
POTASSIUM SERPL-MCNC: 4.4 MMOL/L — SIGNIFICANT CHANGE UP (ref 3.5–5.3)
POTASSIUM SERPL-SCNC: 4.4 MMOL/L — SIGNIFICANT CHANGE UP (ref 3.5–5.3)
RBC # BLD: 3.34 M/UL — LOW (ref 3.8–5.2)
RBC # FLD: 18.9 % — HIGH (ref 10.3–14.5)
SODIUM SERPL-SCNC: 144 MMOL/L — SIGNIFICANT CHANGE UP (ref 135–145)
WBC # BLD: 13.53 K/UL — HIGH (ref 3.8–10.5)
WBC # FLD AUTO: 13.53 K/UL — HIGH (ref 3.8–10.5)

## 2024-02-10 PROCEDURE — 71045 X-RAY EXAM CHEST 1 VIEW: CPT | Mod: 26

## 2024-02-10 PROCEDURE — 99232 SBSQ HOSP IP/OBS MODERATE 35: CPT

## 2024-02-10 RX ORDER — FUROSEMIDE 40 MG
20 TABLET ORAL ONCE
Refills: 0 | Status: COMPLETED | OUTPATIENT
Start: 2024-02-10 | End: 2024-02-10

## 2024-02-10 RX ORDER — IPRATROPIUM/ALBUTEROL SULFATE 18-103MCG
3 AEROSOL WITH ADAPTER (GRAM) INHALATION ONCE
Refills: 0 | Status: COMPLETED | OUTPATIENT
Start: 2024-02-10 | End: 2024-02-10

## 2024-02-10 RX ADMIN — MIDODRINE HYDROCHLORIDE 10 MILLIGRAM(S): 2.5 TABLET ORAL at 06:55

## 2024-02-10 RX ADMIN — DROXIDOPA 300 MILLIGRAM(S): 100 CAPSULE ORAL at 22:32

## 2024-02-10 RX ADMIN — SERTRALINE 25 MILLIGRAM(S): 25 TABLET, FILM COATED ORAL at 12:48

## 2024-02-10 RX ADMIN — HEPARIN SODIUM 5000 UNIT(S): 5000 INJECTION INTRAVENOUS; SUBCUTANEOUS at 06:55

## 2024-02-10 RX ADMIN — CHLORHEXIDINE GLUCONATE 1 APPLICATION(S): 213 SOLUTION TOPICAL at 08:05

## 2024-02-10 RX ADMIN — Medication 1 APPLICATION(S): at 17:49

## 2024-02-10 RX ADMIN — MIDODRINE HYDROCHLORIDE 10 MILLIGRAM(S): 2.5 TABLET ORAL at 21:44

## 2024-02-10 RX ADMIN — DROXIDOPA 300 MILLIGRAM(S): 100 CAPSULE ORAL at 09:01

## 2024-02-10 RX ADMIN — Medication 100 MILLIGRAM(S): at 17:49

## 2024-02-10 RX ADMIN — Medication 100 MILLIGRAM(S): at 06:54

## 2024-02-10 RX ADMIN — Medication 2: at 00:32

## 2024-02-10 RX ADMIN — Medication 1 DROP(S): at 21:43

## 2024-02-10 RX ADMIN — Medication 1 MILLIGRAM(S): at 12:49

## 2024-02-10 RX ADMIN — Medication 100 MILLIGRAM(S): at 12:48

## 2024-02-10 RX ADMIN — CEFEPIME 100 MILLIGRAM(S): 1 INJECTION, POWDER, FOR SOLUTION INTRAMUSCULAR; INTRAVENOUS at 21:39

## 2024-02-10 RX ADMIN — Medication 80 MICROGRAM(S): at 21:44

## 2024-02-10 RX ADMIN — Medication 1 TABLET(S): at 12:49

## 2024-02-10 RX ADMIN — MIDODRINE HYDROCHLORIDE 10 MILLIGRAM(S): 2.5 TABLET ORAL at 15:59

## 2024-02-10 RX ADMIN — TAMOXIFEN CITRATE 20 MILLIGRAM(S): 20 TABLET, FILM COATED ORAL at 12:48

## 2024-02-10 RX ADMIN — Medication 1 APPLICATION(S): at 08:05

## 2024-02-10 RX ADMIN — Medication 3 MILLILITER(S): at 02:45

## 2024-02-10 RX ADMIN — HEPARIN SODIUM 5000 UNIT(S): 5000 INJECTION INTRAVENOUS; SUBCUTANEOUS at 17:51

## 2024-02-10 RX ADMIN — Medication 20 MILLIGRAM(S): at 14:40

## 2024-02-10 RX ADMIN — POLYETHYLENE GLYCOL 3350 17 GRAM(S): 17 POWDER, FOR SOLUTION ORAL at 12:49

## 2024-02-10 RX ADMIN — SENNA PLUS 2 TABLET(S): 8.6 TABLET ORAL at 21:43

## 2024-02-10 RX ADMIN — Medication 500 MILLIGRAM(S): at 12:48

## 2024-02-10 RX ADMIN — DROXIDOPA 300 MILLIGRAM(S): 100 CAPSULE ORAL at 15:59

## 2024-02-10 RX ADMIN — CEFEPIME 100 MILLIGRAM(S): 1 INJECTION, POWDER, FOR SOLUTION INTRAMUSCULAR; INTRAVENOUS at 06:54

## 2024-02-10 RX ADMIN — CEFEPIME 100 MILLIGRAM(S): 1 INJECTION, POWDER, FOR SOLUTION INTRAMUSCULAR; INTRAVENOUS at 14:33

## 2024-02-10 RX ADMIN — Medication 1 DROP(S): at 12:46

## 2024-02-10 NOTE — PROVIDER CONTACT NOTE (OTHER) - ASSESSMENT
Pt coughing/gurgling on own secretions. When suctioned, sputum is frothy. Patient unable to bring up sputum on own. Discussed with ACP Camryn in person. O2 saturation between 91% and 92% at this time.

## 2024-02-10 NOTE — PROGRESS NOTE ADULT - SUBJECTIVE AND OBJECTIVE BOX
[facility] Division of Hospital Medicine  Gabo Gomez MD  Available via MS Teams    SUBJECTIVE / OVERNIGHT EVENTS: Patient seen and examined at bedside, resting comfortably. Patient is unable to participate in review of systems.     MEDICATIONS  (STANDING):  artificial  tears Solution 1 Drop(s) Both EYES two times a day  ascorbic acid 500 milliGRAM(s) Oral daily  cefepime   IVPB 1000 milliGRAM(s) IV Intermittent every 8 hours  chlorhexidine 2% Cloths 1 Application(s) Topical <User Schedule>  Dakins Solution - 1/4 Strength 1 Application(s) Topical every 12 hours  droxidopa 300 milliGRAM(s) Oral every 8 hours  folic acid 1 milliGRAM(s) Oral daily  furosemide   Injectable 20 milliGRAM(s) IV Push once  heparin   Injectable 5000 Unit(s) SubCutaneous every 12 hours  insulin lispro (ADMELOG) corrective regimen sliding scale   SubCutaneous every 6 hours  levothyroxine Injectable 80 MICROGram(s) IV Push at bedtime  metroNIDAZOLE  IVPB 500 milliGRAM(s) IV Intermittent every 12 hours  midodrine 10 milliGRAM(s) Oral every 8 hours  multivitamin 1 Tablet(s) Oral daily  polyethylene glycol 3350 17 Gram(s) Oral daily  senna 2 Tablet(s) Oral at bedtime  sertraline 25 milliGRAM(s) Oral daily  tamoxifen 20 milliGRAM(s) Oral daily  thiamine 100 milliGRAM(s) Oral daily    MEDICATIONS  (PRN):  HYDROmorphone  Injectable 0.2 milliGRAM(s) IV Push every 8 hours PRN dressing change      I&O's Summary    09 Feb 2024 07:01  -  10 Feb 2024 07:00  --------------------------------------------------------  IN: 1525 mL / OUT: 1400 mL / NET: 125 mL        PHYSICAL EXAM:  Vital Signs Last 24 Hrs  T(C): 36.6 (10 Feb 2024 09:56), Max: 36.9 (09 Feb 2024 13:42)  T(F): 97.8 (10 Feb 2024 09:56), Max: 98.5 (10 Feb 2024 03:41)  HR: 69 (10 Feb 2024 09:56) (66 - 81)  BP: 100/65 (10 Feb 2024 09:56) (96/62 - 119/78)  RR: 17 (10 Feb 2024 09:56) (17 - 20)  SpO2: 99% (10 Feb 2024 09:56) (86% - 100%)    Parameters below as of 10 Feb 2024 09:56  Patient On (Oxygen Delivery Method): nasal cannula  O2 Flow (L/min): 6    CONSTITUTIONAL: Frail, cathectic   EYES: PERRLA; conjunctiva and sclera clear  ENMT: Moist oral mucosa, no pharyngeal injection or exudates; normal dentition  NECK: Supple, no palpable masses; no thyromegaly  RESPIRATORY: Normal respiratory effort; lungs are clear to auscultation bilaterally  CARDIOVASCULAR: normal S1 and S2, no murmur/rub/gallop; No lower extremity edema  ABDOMEN: Nontender to palpation, normoactive bowel sounds, no rebound/guarding; No hepatosplenomegaly  MUSCULOSKELETAL:  no clubbing or cyanosis of digits; no joint swelling or tenderness to palpation  PSYCH: A+O to person, place, and time; affect appropriate  NEUROLOGY: CN 2-12 are intact and symmetric; no gross sensory deficits   SKIN: Multiple severe wounds     LABS:                        9.1    13.53 )-----------( 262      ( 10 Feb 2024 08:44 )             31.1     02-10    144  |  107  |  16  ----------------------------<  122<H>  4.4   |  24  |  <0.30<L>    Ca    8.6      10 Feb 2024 08:44  Phos  2.5     02-09  Mg     2.3     02-09            Urinalysis Basic - ( 10 Feb 2024 08:44 )    Color: x / Appearance: x / SG: x / pH: x  Gluc: 122 mg/dL / Ketone: x  / Bili: x / Urobili: x   Blood: x / Protein: x / Nitrite: x   Leuk Esterase: x / RBC: x / WBC x   Sq Epi: x / Non Sq Epi: x / Bacteria: x        COVID-19 PCR: NotDetec (14 Jan 2024 21:34)  SARS-CoV-2: NotDetec (29 Dec 2023 03:06)  [facility] (28 Dec 2023 09:19)

## 2024-02-10 NOTE — PROVIDER CONTACT NOTE (OTHER) - ASSESSMENT
Patient saturating 97% during duoneb treatment & after suctioning with respiratory therapy. Once taken off duoneb, patient saturating 86%. ACP Camryn at bedside, discussed plan of care. Patient still coughing on secretions. Patient saturating 97% during duoneb treatment & after suctioning with respiratory therapy. Once taken off duoneb, patient saturating 86%. ACP Camryn at bedside, discussed plan of care. Patient still coughing on secretions. NC placed 92% on 3L. Tube feedings held.

## 2024-02-10 NOTE — CHART NOTE - NSCHARTNOTEFT_GEN_A_CORE
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 Found to have leukocytosis, tachycardia and hypotension here as well. Difficult to assess, contracted and non-verbal. Now, patient with desaturation 89-91 % (SpO2: %) on 4 L NC    Vital Signs Last 24 Hrs  T(C): 36.9 (10 Feb 2024 00:17), Max: 36.9 (09 Feb 2024 13:42)  T(F): 98.4 (10 Feb 2024 00:17), Max: 98.4 (09 Feb 2024 13:42)  HR: 66 (10 Feb 2024 00:17) (66 - 89)  BP: 108/61 (10 Feb 2024 00:17) (96/62 - 110/74)  BP(mean): --  RR: 18 (10 Feb 2024 00:17) (18 - 19)  SpO2: 91% (10 Feb 2024 02:55) (86% - 97%)    Parameters below as of 10 Feb 2024 02:55  Patient On (Oxygen Delivery Method): nasal cannula  O2 Flow (L/min): 3    - Increase 2 L NC to 4L NC   - albuterol 2 puffs inhalation STAT given  - Deep suctioning with thick secretions   - Stat CXR ordered   - ABG ordered   - Maintain SpO2 > 90%  - Needs continuous oxygenation monitoring; will transfer to [facility] floor   - Discussed with RN  -Continue to monitor      Camryn Spence NP  Department of Medicine   Spectra [phone]

## 2024-02-10 NOTE — ADVANCED PRACTICE NURSE CONSULT - ASSESSMENT
Midline Catheter Insertion Note    Catheter type: 4F  : Bard  Power Injectable: Yes  Ref#: [id number]  Procedure assisted by: Luis Lopez RN    Time out was preformed, confirming the patient's first and last name, date of birth, procedure, and correct site prior to state of procedure.  Patient was placed with HOB 30 degrees. Patient placement site was prepped with chlorhexidine solution, then draped using maximum sterile barrier protection. Using the Bard Site Rite 8, the catheter was placed using the Modified Seldinger Technique. The area was injected with 2 ml of 1% lidocaine. Using the ultrasound, the catheter was introduced. Strict adherence to outline aseptic technique including handwashing, glove and gown, utilizing mask and cap, plus draping the patient with a sterile drape was observed. Upon completion of line placement, the insertion site was covered with a sterile occlusive CHG dressing. Pt tolerated procedure well.   All materials used for catheter insertion, including the intact guide wires, were accounted for at the end of the procedure.    Number of attempts: 1  Complications/Comments: None  Emergency Placement: No    Site: new site  Anatomical Site of insertion: R Brachial   Catheter size/length: 4 Fr., 20 cm.  US guided Bard SL Power MIDLINE placed

## 2024-02-10 NOTE — PROGRESS NOTE ADULT - SUBJECTIVE AND OBJECTIVE BOX
DATE OF SERVICE: 02-10-24 @ 14:18    Patient is a 68y old  Female who presents with a chief complaint of Sepsis 2/2 osteomyelitis (10 Feb 2024 11:03)      INTERVAL HISTORY:     REVIEW OF SYSTEMS:  CONSTITUTIONAL: No weakness  EYES/ENT: No visual changes;  No throat pain   NECK: No pain or stiffness  RESPIRATORY: No cough, wheezing; No shortness of breath  CARDIOVASCULAR: No chest pain or palpitations  GASTROINTESTINAL: No abdominal  pain. No nausea, vomiting, or hematemesis  GENITOURINARY: No dysuria, frequency or hematuria  NEUROLOGICAL: No stroke like symptoms  SKIN: No rashes    TELEMETRY Personally reviewed: SR 70-80   	  MEDICATIONS:  droxidopa 300 milliGRAM(s) Oral every 8 hours  furosemide   Injectable 20 milliGRAM(s) IV Push once  midodrine 10 milliGRAM(s) Oral every 8 hours        PHYSICAL EXAM:  T(C): 36.6 (02-10-24 @ 09:56), Max: 36.9 (02-10-24 @ 00:17)  HR: 69 (02-10-24 @ 09:56) (66 - 81)  BP: 100/65 (02-10-24 @ 09:56) (96/62 - 119/78)  RR: 17 (02-10-24 @ 09:56) (17 - 20)  SpO2: 99% (02-10-24 @ 09:56) (86% - 100%)  Wt(kg): --  I&O's Summary    09 Feb 2024 07:01  -  10 Feb 2024 07:00  --------------------------------------------------------  IN: 1525 mL / OUT: 1400 mL / NET: 125 mL          Appearance: In no distress	  HEENT:    PERRL, EOMI	  Cardiovascular:  S1 S2, No JVD  Respiratory: Lungs clear to auscultation	  Gastrointestinal:  Soft, Non-tender, + BS	  Vascularature:  No edema of LE  Psychiatric: Appropriate affect   Neuro: no acute focal deficits                               9.1    13.53 )-----------( 262      ( 10 Feb 2024 08:44 )             31.1     02-10    144  |  107  |  16  ----------------------------<  122<H>  4.4   |  24  |  <0.30<L>    Ca    8.6      10 Feb 2024 08:44  Phos  2.5     02-09  Mg     2.3     02-09          Labs personally reviewed      ASSESSMENT/PLAN: 	    67 yo F with hx TBI (nonverbal, bedbound/contracted at baseline) s/p PEG last admission due to recurrent hypernatremia and chronic malnutrition, CVA, hypothyroidism, L breast cancer (s/p mastectomy tissue expander still in place ) who presented for decreased responsiveness and hypotension, found to have sepsis with MSSA bacteremia, likely source sacral ulcer w/ c/f possible osteomyelitis. Subsequently required ICU care given hypotension 2/2 likely septic shock, requiring pressors.       1. Problem/Plan:  Problem: Hypotension   Septic Shock 2/2 Bacteremia with MSSA.   - Previously in MICU requiring pressors, now off levo   -c/w Midodrine 10mg PO q8h  - c/w droxidopa - decreased to 300 mg q8h    2. Problem/Plan:  Problem: Hypothyroidism.   - Continue levothyroxine  - TSH wnl.    3. Problem/Plan:  Problem: DVT ppx  - Continue subq Heparin             DAVIDA Warren DO [facility]  Cardiovascular Medicine  [address] [place] [address], [address]  Available through call or text on [employer]AM[employer]  Office: [phone]

## 2024-02-10 NOTE — PROVIDER CONTACT NOTE (OTHER) - RECOMMENDATIONS
Pt to be transferred to tele/continuous pulse ox floor. On 3 L NC. Pt to be transferred to tele/continuous pulse ox floor. On 3L NC.

## 2024-02-11 LAB
ANION GAP SERPL CALC-SCNC: 10 MMOL/L — SIGNIFICANT CHANGE UP (ref 5–17)
BASE EXCESS BLDA CALC-SCNC: 6.2 MMOL/L — HIGH (ref -2–3)
BUN SERPL-MCNC: 17 MG/DL — SIGNIFICANT CHANGE UP (ref 7–23)
CALCIUM SERPL-MCNC: 8.2 MG/DL — LOW (ref 8.4–10.5)
CHLORIDE SERPL-SCNC: 103 MMOL/L — SIGNIFICANT CHANGE UP (ref 96–108)
CO2 BLDA-SCNC: 31 MMOL/L — HIGH (ref 19–24)
CO2 SERPL-SCNC: 26 MMOL/L — SIGNIFICANT CHANGE UP (ref 22–31)
CREAT SERPL-MCNC: <0.3 MG/DL — LOW (ref 0.5–1.3)
EGFR: 114 ML/MIN/1.73M2 — SIGNIFICANT CHANGE UP
GAS PNL BLDA: SIGNIFICANT CHANGE UP
GLUCOSE BLDC GLUCOMTR-MCNC: 105 MG/DL — HIGH (ref 70–99)
GLUCOSE BLDC GLUCOMTR-MCNC: 131 MG/DL — HIGH (ref 70–99)
GLUCOSE BLDC GLUCOMTR-MCNC: 133 MG/DL — HIGH (ref 70–99)
GLUCOSE BLDC GLUCOMTR-MCNC: 154 MG/DL — HIGH (ref 70–99)
GLUCOSE BLDC GLUCOMTR-MCNC: 206 MG/DL — HIGH (ref 70–99)
GLUCOSE SERPL-MCNC: 104 MG/DL — HIGH (ref 70–99)
HCO3 BLDA-SCNC: 30 MMOL/L — HIGH (ref 21–28)
HCT VFR BLD CALC: 32.8 % — LOW (ref 34.5–45)
HGB BLD-MCNC: 9.4 G/DL — LOW (ref 11.5–15.5)
HOROWITZ INDEX BLDA+IHG-RTO: 21 — SIGNIFICANT CHANGE UP
MAGNESIUM SERPL-MCNC: 2.4 MG/DL — SIGNIFICANT CHANGE UP (ref 1.6–2.6)
MCHC RBC-ENTMCNC: 26.8 PG — LOW (ref 27–34)
MCHC RBC-ENTMCNC: 28.7 GM/DL — LOW (ref 32–36)
MCV RBC AUTO: 93.4 FL — SIGNIFICANT CHANGE UP (ref 80–100)
NRBC # BLD: 0 /100 WBCS — SIGNIFICANT CHANGE UP (ref 0–0)
PCO2 BLDA: 37 MMHG — SIGNIFICANT CHANGE UP (ref 32–45)
PH BLDA: 7.51 — HIGH (ref 7.35–7.45)
PHOSPHATE SERPL-MCNC: 2.5 MG/DL — SIGNIFICANT CHANGE UP (ref 2.5–4.5)
PLATELET # BLD AUTO: 251 K/UL — SIGNIFICANT CHANGE UP (ref 150–400)
PO2 BLDA: 64 MMHG — LOW (ref 83–108)
POTASSIUM SERPL-MCNC: 4.4 MMOL/L — SIGNIFICANT CHANGE UP (ref 3.5–5.3)
POTASSIUM SERPL-SCNC: 4.4 MMOL/L — SIGNIFICANT CHANGE UP (ref 3.5–5.3)
RBC # BLD: 3.51 M/UL — LOW (ref 3.8–5.2)
RBC # FLD: 19.1 % — HIGH (ref 10.3–14.5)
SAO2 % BLDA: 94.5 % — SIGNIFICANT CHANGE UP (ref 94–98)
SODIUM SERPL-SCNC: 139 MMOL/L — SIGNIFICANT CHANGE UP (ref 135–145)
WBC # BLD: 10.93 K/UL — HIGH (ref 3.8–10.5)
WBC # FLD AUTO: 10.93 K/UL — HIGH (ref 3.8–10.5)

## 2024-02-11 PROCEDURE — 99222 1ST HOSP IP/OBS MODERATE 55: CPT | Mod: GC

## 2024-02-11 PROCEDURE — 99232 SBSQ HOSP IP/OBS MODERATE 35: CPT

## 2024-02-11 RX ADMIN — SENNA PLUS 2 TABLET(S): 8.6 TABLET ORAL at 22:20

## 2024-02-11 RX ADMIN — Medication 1 DROP(S): at 13:57

## 2024-02-11 RX ADMIN — SERTRALINE 25 MILLIGRAM(S): 25 TABLET, FILM COATED ORAL at 13:56

## 2024-02-11 RX ADMIN — Medication 1 TABLET(S): at 13:57

## 2024-02-11 RX ADMIN — HEPARIN SODIUM 5000 UNIT(S): 5000 INJECTION INTRAVENOUS; SUBCUTANEOUS at 17:49

## 2024-02-11 RX ADMIN — CEFEPIME 100 MILLIGRAM(S): 1 INJECTION, POWDER, FOR SOLUTION INTRAMUSCULAR; INTRAVENOUS at 05:35

## 2024-02-11 RX ADMIN — Medication 1 APPLICATION(S): at 17:43

## 2024-02-11 RX ADMIN — POLYETHYLENE GLYCOL 3350 17 GRAM(S): 17 POWDER, FOR SOLUTION ORAL at 13:57

## 2024-02-11 RX ADMIN — TAMOXIFEN CITRATE 20 MILLIGRAM(S): 20 TABLET, FILM COATED ORAL at 13:56

## 2024-02-11 RX ADMIN — DROXIDOPA 300 MILLIGRAM(S): 100 CAPSULE ORAL at 05:37

## 2024-02-11 RX ADMIN — Medication 100 MILLIGRAM(S): at 17:48

## 2024-02-11 RX ADMIN — Medication 1: at 14:52

## 2024-02-11 RX ADMIN — Medication 100 MILLIGRAM(S): at 06:07

## 2024-02-11 RX ADMIN — MIDODRINE HYDROCHLORIDE 10 MILLIGRAM(S): 2.5 TABLET ORAL at 05:37

## 2024-02-11 RX ADMIN — CEFEPIME 100 MILLIGRAM(S): 1 INJECTION, POWDER, FOR SOLUTION INTRAMUSCULAR; INTRAVENOUS at 22:14

## 2024-02-11 RX ADMIN — Medication 1 APPLICATION(S): at 05:40

## 2024-02-11 RX ADMIN — MIDODRINE HYDROCHLORIDE 10 MILLIGRAM(S): 2.5 TABLET ORAL at 22:25

## 2024-02-11 RX ADMIN — HEPARIN SODIUM 5000 UNIT(S): 5000 INJECTION INTRAVENOUS; SUBCUTANEOUS at 05:39

## 2024-02-11 RX ADMIN — DROXIDOPA 300 MILLIGRAM(S): 100 CAPSULE ORAL at 13:56

## 2024-02-11 RX ADMIN — Medication 100 MILLIGRAM(S): at 13:56

## 2024-02-11 RX ADMIN — CEFEPIME 100 MILLIGRAM(S): 1 INJECTION, POWDER, FOR SOLUTION INTRAMUSCULAR; INTRAVENOUS at 14:02

## 2024-02-11 RX ADMIN — Medication 80 MICROGRAM(S): at 22:14

## 2024-02-11 RX ADMIN — DROXIDOPA 300 MILLIGRAM(S): 100 CAPSULE ORAL at 22:20

## 2024-02-11 RX ADMIN — CHLORHEXIDINE GLUCONATE 1 APPLICATION(S): 213 SOLUTION TOPICAL at 05:39

## 2024-02-11 RX ADMIN — MIDODRINE HYDROCHLORIDE 10 MILLIGRAM(S): 2.5 TABLET ORAL at 13:56

## 2024-02-11 RX ADMIN — Medication 500 MILLIGRAM(S): at 13:57

## 2024-02-11 RX ADMIN — Medication 1 DROP(S): at 22:22

## 2024-02-11 RX ADMIN — Medication 1 MILLIGRAM(S): at 13:55

## 2024-02-11 NOTE — CONSULT NOTE ADULT - ASSESSMENT
Patient is a 68 year old F with hx of TBI, CVA (nonverbal, bedbound at baseline) hypothyroidism, L breast cancer (s/p mastectomy and breast tissue expander, on tamoxifen) who presents from [facility] for several days of lethargy, dec responsiveness, and fever. The patient was recently discharged (1/10/2024) after a prolonged hospitalization for UTI, hypernatremia, FTT, complicated by AHRF 2/2 to aspiration PNA. Now on abx for septic shock with MSSA bactermia , still on oral pressors. Course is now complicated by AHRF requiring HFNC.      Pulm consulted for hypoxemia    #recommendations  patient likely with recurrent aspiration leading to hypoxemia,   continue with oral care  aggressive airway clearance  airway clearance: humidified oxygen, standing guaifenesin, duonebs q6h, hyper sal q6h, chest PT q6 h, aerobika/acapella q6 h, deep NT suction q6 hr, chest vest q12h  maintain euvolemia  titrate oxygen to O2 >88%, use humidified oxygen  incentive spirometry, OOB to chair, PT/OT  aspiration precautions as necessary  DVT ppx as tolerated  airway clearance as necessary

## 2024-02-11 NOTE — CONSULT NOTE ADULT - CONSULT REQUESTED DATE/TIME
31-Jan-2024 05:04
08-Feb-2024 15:38
31-Jan-2024 16:27
31-Jan-2024 12:25
11-Feb-2024 16:28
31-Jan-2024

## 2024-02-11 NOTE — CONSULT NOTE ADULT - REASON FOR ADMISSION
Sepsis 2/2 osteomyelitis

## 2024-02-11 NOTE — PROGRESS NOTE ADULT - SUBJECTIVE AND OBJECTIVE BOX
[facility] Division of Hospital Medicine  Gabo Gomez MD  Available via MS Teams    SUBJECTIVE / OVERNIGHT EVENTS: Patient seen and examined at bedside, resting comfortably and in no acute distress. Unable to participate in review of systems.     MEDICATIONS  (STANDING):  artificial  tears Solution 1 Drop(s) Both EYES two times a day  ascorbic acid 500 milliGRAM(s) Oral daily  cefepime   IVPB 1000 milliGRAM(s) IV Intermittent every 8 hours  chlorhexidine 2% Cloths 1 Application(s) Topical <User Schedule>  Dakins Solution - 1/4 Strength 1 Application(s) Topical every 12 hours  droxidopa 300 milliGRAM(s) Oral every 8 hours  folic acid 1 milliGRAM(s) Oral daily  heparin   Injectable 5000 Unit(s) SubCutaneous every 12 hours  insulin lispro (ADMELOG) corrective regimen sliding scale   SubCutaneous every 6 hours  levothyroxine Injectable 80 MICROGram(s) IV Push at bedtime  metroNIDAZOLE  IVPB 500 milliGRAM(s) IV Intermittent every 12 hours  midodrine 10 milliGRAM(s) Oral every 8 hours  multivitamin 1 Tablet(s) Oral daily  polyethylene glycol 3350 17 Gram(s) Oral daily  senna 2 Tablet(s) Oral at bedtime  sertraline 25 milliGRAM(s) Oral daily  tamoxifen 20 milliGRAM(s) Oral daily  thiamine 100 milliGRAM(s) Oral daily    MEDICATIONS  (PRN):  HYDROmorphone  Injectable 0.2 milliGRAM(s) IV Push every 8 hours PRN dressing change      I&O's Summary    10 Feb 2024 07:01  -  11 Feb 2024 07:00  --------------------------------------------------------  IN: 1740 mL / OUT: 1550 mL / NET: 190 mL        PHYSICAL EXAM:  Vital Signs Last 24 Hrs  T(C): 36.4 (11 Feb 2024 09:10), Max: 37 (10 Feb 2024 21:22)  T(F): 97.6 (11 Feb 2024 09:10), Max: 98.6 (10 Feb 2024 21:22)  HR: 99 (11 Feb 2024 09:10) (68 - 99)  BP: 96/64 (11 Feb 2024 09:10) (96/64 - 121/64)  RR: 19 (11 Feb 2024 09:10) (18 - 20)  SpO2: 99% (11 Feb 2024 09:10) (95% - 100%)    Parameters below as of 11 Feb 2024 08:45  Patient On (Oxygen Delivery Method): nasal cannula, high flow  O2 Flow (L/min): 60  O2 Concentration (%): 80    CONSTITUTIONAL: Cathectic   EYES: PERRLA; conjunctiva and sclera clear  ENMT: Moist oral mucosa, no pharyngeal injection or exudates; normal dentition  NECK: Supple, no palpable masses; no thyromegaly  RESPIRATORY: Normal respiratory effort; coarse crackles to left lung   CARDIOVASCULAR: normal S1 and S2, no murmur/rub/gallop; No lower extremity edema  ABDOMEN: Nontender to palpation, normoactive bowel sounds, no rebound/guarding; No hepatosplenomegaly  MUSCULOSKELETAL:  no clubbing or cyanosis of digits; no joint swelling or tenderness to palpation  PSYCH: A+O to person, place, and time; affect appropriate  NEUROLOGY: CN 2-12 are intact and symmetric; no gross sensory deficits   SKIN: Wounds on back     LABS:                        9.4    10.93 )-----------( 251      ( 11 Feb 2024 08:27 )             32.8     02-11    139  |  103  |  17  ----------------------------<  104<H>  4.4   |  26  |  <0.30<L>    Ca    8.2<L>      11 Feb 2024 08:27  Phos  2.5     02-11  Mg     2.4     02-11            Urinalysis Basic - ( 11 Feb 2024 08:27 )    Color: x / Appearance: x / SG: x / pH: x  Gluc: 104 mg/dL / Ketone: x  / Bili: x / Urobili: x   Blood: x / Protein: x / Nitrite: x   Leuk Esterase: x / RBC: x / WBC x   Sq Epi: x / Non Sq Epi: x / Bacteria: x        COVID-19 PCR: NotDetec (14 Jan 2024 21:34)  SARS-CoV-2: NotDetec (29 Dec 2023 03:06)  SARS-CoV-2: [place] (28 Dec 2023 09:19)

## 2024-02-11 NOTE — PROGRESS NOTE ADULT - ASSESSMENT
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 Admitted for septic shock likely 2/2 to wound infection, complicated by bacteremia. Course is now complicated by AHRF requiring HFNC.

## 2024-02-11 NOTE — CONSULT NOTE ADULT - ATTENDING COMMENTS
66 y/o F w/PMH as above including prior CVA w/poor baseline functional status admitted for severe sepsis with septic shock and acute hypoxemic respiratory failure likely secondary to PNA.    - Continue HFNC, goal O2 sat >= 90%  - L side down  - Chest PT, airway clearance, hypertonic saline, bronchodilators  - Broad spectrum abx  - GOC discussions, patient with poor prognosis

## 2024-02-11 NOTE — CONSULT NOTE ADULT - SUBJECTIVE AND OBJECTIVE BOX
CHIEF COMPLAINT:    HPI:  Patient is a 68 year old F with hx of TBI, CVA (nonverbal, bedbound at baseline) hypothyroidism, L breast cancer (s/p mastectomy and breast tissue expander, on tamoxifen) who presents from [facility] for several days of lethargy, dec responsiveness, and fever. The patient was recently discharged (1/10/2024) after a prolonged hospitalization for UTI, hypernatremia, FTT, complicated by AHRF 2/2 to aspiration PNA.     In the ED, VS notable for HR , BP 70-90/50-60s, Tmax 103.4F, SpO2 100% on 2L NC. Labs remarkable for WBC 14.75, hgb 10.5, Na 151, Cl 115, and AST/ALT elevation. The patient received vancomycin, zosyn, 3L NS, 20 midodrine, 50 IV hydrocortisone, and started on mIVF with 1/2 NS. CT remarkable for sacral decubitus ulcer with possible osteomyelitis. Patient also found to have MSSA bacteremia on 1/30. Initially managed on vanc/zosyn -> transitioned to ancef 2/1. On 2/2 RRT called for hypotension, received 1L bolus + midodrine with minimal improvement in BPs, started on levophed.     Patient transferred to MICU for hypotension requiring pressors, presumed to be 2/2 sepsis ISO sacral ulcer w/ MSSA bacteremia. Subsequent blood cultures clear. Patient was followed by ID, antibiotics were re-broadened from ancef to cefepime/flagyll per ID recs to cover possible gram negative wound infection. WBC downtrended, fevers resolved and hypotension improved with gradual weaning of pressor requirements. Weaned off levophed 2/8 overnight, weaning off droxidopa. O2 requirements weaned to RA. At this time patient is stable for transfer to medicine floor.      since transfer patient has required increased amounts of supplemental oxygen. now on max HFNC . Patient unable to participate in exam.     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      C Section      Abnormality, eye  h/o left eye vitrectomy      S/P D&C (status post dilation and curettage)      H/O left mastectomy          FAMILY HISTORY:  No pertinent family history in first degree relatives        SOCIAL HISTORY:  never smoker     Allergies    Tapazole (Hives)    Intolerances        HOME MEDICATIONS:  Home Medications:  ascorbic acid 500 mg oral tablet: 1 tab(s) orally once a day (31 Jan 2024 01:14)  folic acid 1 mg oral tablet: 1 tab(s) orally once a day (31 Jan 2024 01:14)  levothyroxine 112 mcg (0.112 mg) oral tablet: 1 tab(s) orally once a day (31 Jan 2024 01:14)  Multiple Vitamins oral tablet: 1 tab(s) orally once a day (31 Jan 2024 01:14)  polyethylene glycol 3350 oral powder for reconstitution: 17 gram(s) orally once a day (31 Jan 2024 01:14)  Santyl 250 units/g topical ointment: Apply topically to affected area once a day (31 Jan 2024 01:14)  senna leaf extract oral tablet: 2 tab(s) orally once a day (at bedtime) (31 Jan 2024 01:14)  sertraline 25 mg oral tablet: 1 tab(s) orally once a day (31 Jan 2024 01:14)  tamoxifen 20 mg oral tablet: 1 cap(s) orally once a day (31 Jan 2024 01:14)  thiamine 100 mg oral tablet: 1 tab(s) orally once a day (31 Jan 2024 01:14)      REVIEW OF SYSTEMS:    [x] Unable to assess ROS because AMS    OBJECTIVE:  ICU Vital Signs Last 24 Hrs  T(C): 36.6 (11 Feb 2024 13:50), Max: 37 (10 Feb 2024 21:22)  T(F): 97.8 (11 Feb 2024 13:50), Max: 98.6 (10 Feb 2024 21:22)  HR: 73 (11 Feb 2024 16:00) (68 - 99)  BP: 102/71 (11 Feb 2024 13:50) (96/64 - 121/64)  BP(mean): --  ABP: --  ABP(mean): --  RR: 17 (11 Feb 2024 16:00) (17 - 20)  SpO2: 100% (11 Feb 2024 16:00) (95% - 100%)    O2 Parameters below as of 11 Feb 2024 16:00  Patient On (Oxygen Delivery Method): nasal cannula, high flow  O2 Flow (L/min): 50  O2 Concentration (%): 60          02-10 @ 07:01  -  02-11 @ 07:00  --------------------------------------------------------  IN: 1740 mL / OUT: 1550 mL / NET: 190 mL      CAPILLARY BLOOD GLUCOSE      POCT Blood Glucose.: 154 mg/dL (11 Feb 2024 14:37)      PHYSICAL EXAM:  General: awake non interactive  HEENT: NC/AT, EOMI b/l, conjunctiva normal, MMM  Lymph Nodes: no cervical LAD  Neck: supple. full range of motion  Respiratory: no accessory muscle use  Cardiovascular: S1 S2 present, RRR, no m/r/g  Abdomen: soft, NT/ND, +BS  Extremities: no c/c/e  Skin: no rashes or lesions noted  Neurological: AAO0  Psychiatry: calm, cooperative    LINES:     HOSPITAL MEDICATIONS:  Standing Meds:  artificial  tears Solution 1 Drop(s) Both EYES two times a day  ascorbic acid 500 milliGRAM(s) Oral daily  cefepime   IVPB 1000 milliGRAM(s) IV Intermittent every 8 hours  chlorhexidine 2% Cloths 1 Application(s) Topical <User Schedule>  Dakins Solution - 1/4 Strength 1 Application(s) Topical every 12 hours  droxidopa 300 milliGRAM(s) Oral every 8 hours  folic acid 1 milliGRAM(s) Oral daily  heparin   Injectable 5000 Unit(s) SubCutaneous every 12 hours  insulin lispro (ADMELOG) corrective regimen sliding scale   SubCutaneous every 6 hours  levothyroxine Injectable 80 MICROGram(s) IV Push at bedtime  metroNIDAZOLE  IVPB 500 milliGRAM(s) IV Intermittent every 12 hours  midodrine 10 milliGRAM(s) Oral every 8 hours  multivitamin 1 Tablet(s) Oral daily  polyethylene glycol 3350 17 Gram(s) Oral daily  senna 2 Tablet(s) Oral at bedtime  sertraline 25 milliGRAM(s) Oral daily  tamoxifen 20 milliGRAM(s) Oral daily  thiamine 100 milliGRAM(s) Oral daily      PRN Meds:  HYDROmorphone  Injectable 0.2 milliGRAM(s) IV Push every 8 hours PRN      LABS:                        9.4    10.93 )-----------( 251      ( 11 Feb 2024 08:27 )             32.8     Hgb Trend: 9.4<--, 9.1<--, 9.0<--, 8.4<--, 8.7<--  02-11    139  |  103  |  17  ----------------------------<  104<H>  4.4   |  26  |  <0.30<L>    Ca    8.2<L>      11 Feb 2024 08:27  Phos  2.5     02-11  Mg     2.4     02-11      Creatinine Trend: <0.30<--, <0.30<--, <0.30<--, <0.30<--, <0.30<--, <0.30<--    Urinalysis Basic - ( 11 Feb 2024 08:27 )    Color: x / Appearance: x / SG: x / pH: x  Gluc: 104 mg/dL / Ketone: x  / Bili: x / Urobili: x   Blood: x / Protein: x / Nitrite: x   Leuk Esterase: x / RBC: x / WBC x   Sq Epi: x / Non Sq Epi: x / Bacteria: x      Arterial Blood Gas:  02-11 @ 02:18  7.51/37/64/30/94.5/6.2  ABG lactate: --  Arterial Blood Gas:  02-10 @ 03:30  7.52/35/104/29/98.4/5.5  ABG lactate: --        MICROBIOLOGY:       RADIOLOGY:  [ ] Reviewed and interpreted by me    PULMONARY FUNCTION TESTS:    EKG:

## 2024-02-11 NOTE — PROGRESS NOTE ADULT - SUBJECTIVE AND OBJECTIVE BOX
DATE OF SERVICE: 02-11-24 @ 13:32    Patient is a 68y old  Female who presents with a chief complaint of Sepsis 2/2 osteomyelitis (11 Feb 2024 10:16)      INTERVAL HISTORY:     REVIEW OF SYSTEMS:  CONSTITUTIONAL: No weakness  EYES/ENT: No visual changes;  No throat pain   NECK: No pain or stiffness  RESPIRATORY: No cough, wheezing; No shortness of breath  CARDIOVASCULAR: No chest pain or palpitations  GASTROINTESTINAL: No abdominal  pain. No nausea, vomiting, or hematemesis  GENITOURINARY: No dysuria, frequency or hematuria  NEUROLOGICAL: No stroke like symptoms  SKIN: No rashes    TELEMETRY Personally reviewed: SR 70-80   	  MEDICATIONS:  droxidopa 300 milliGRAM(s) Oral every 8 hours  midodrine 10 milliGRAM(s) Oral every 8 hours        PHYSICAL EXAM:  T(C): 36.4 (02-11-24 @ 09:10), Max: 37 (02-10-24 @ 21:22)  HR: 99 (02-11-24 @ 09:10) (68 - 99)  BP: 96/64 (02-11-24 @ 09:10) (96/64 - 121/64)  RR: 19 (02-11-24 @ 09:10) (18 - 20)  SpO2: 99% (02-11-24 @ 09:10) (95% - 100%)  Wt(kg): --  I&O's Summary    10 Feb 2024 07:01  -  11 Feb 2024 07:00  --------------------------------------------------------  IN: 1740 mL / OUT: 1550 mL / NET: 190 mL          Appearance: In no distress	  HEENT:    PERRL, EOMI	  Cardiovascular:  S1 S2, No JVD  Respiratory: Lungs clear to auscultation	  Gastrointestinal:  Soft, Non-tender, + BS	  Vascularature:  No edema of LE  Psychiatric: Appropriate affect   Neuro: no acute focal deficits                               9.4    10.93 )-----------( 251      ( 11 Feb 2024 08:27 )             32.8     02-11    139  |  103  |  17  ----------------------------<  104<H>  4.4   |  26  |  <0.30<L>    Ca    8.2<L>      11 Feb 2024 08:27  Phos  2.5     02-11  Mg     2.4     02-11          Labs personally reviewed      ASSESSMENT/PLAN: 	    67 yo F with hx TBI (nonverbal, bedbound/contracted at baseline) s/p PEG last admission due to recurrent hypernatremia and chronic malnutrition, CVA, hypothyroidism, L breast cancer (s/p mastectomy tissue expander still in place ) who presented for decreased responsiveness and hypotension, found to have sepsis with MSSA bacteremia, likely source sacral ulcer w/ c/f possible osteomyelitis. Subsequently required ICU care given hypotension 2/2 likely septic shock, requiring pressors.       1. Problem/Plan:  Problem: Hypotension   Septic Shock 2/2 Bacteremia with MSSA.   - Previously in MICU requiring pressors, now off levo   -c/w Midodrine 10mg PO q8h  - c/w droxidopa - decreased to 300 mg q8h    2. Problem/Plan:  Problem: Hypothyroidism.   - Continue levothyroxine  - TSH wnl.    3. Problem/Plan:  Problem: DVT ppx  - Continue subq Heparin             DAVIDA Warren DO [facility]  Cardiovascular Medicine  [address], [address]  Available through call or text on [employer]  Office: [phone]

## 2024-02-11 NOTE — CHART NOTE - NSCHARTNOTEFT_GEN_A_CORE
Called by RN to evaluate patient due to hypoxia on 6L NC. Patient became hypoxic on 6L NC, eventually needing 100% NRB. Patient seen and evaluated at bedside. Unable to obtain ROS due to patient's mental status.       Vital Signs Last 24 Hrs  T(C): 36.8 (11 Feb 2024 01:00), Max: 37 (10 Feb 2024 21:22)  T(F): 98.2 (11 Feb 2024 01:00), Max: 98.6 (10 Feb 2024 21:22)  HR: 73 (11 Feb 2024 01:00) (69 - 90)  BP: 121/64 (11 Feb 2024 01:00) (100/61 - 121/64)  BP(mean): --  RR: 18 (11 Feb 2024 01:00) (17 - 20)  SpO2: 99% (11 Feb 2024 01:00) (95% - 100%)    Parameters below as of 11 Feb 2024 01:00  Patient On (Oxygen Delivery Method): nasal cannula  O2 Flow (L/min): 6        Labs:                          9.1    13.53 )-----------( 262      ( 10 Feb 2024 08:44 )             31.1     02-10    144  |  107  |  16  ----------------------------<  122<H>  4.4   |  24  |  <0.30<L>    Ca    8.6      10 Feb 2024 08:44  Phos  2.5     02-09  Mg     2.3     02-09      ABG - ( 11 Feb 2024 02:18 )  pH, Arterial: 7.51  pH, Blood: x     /  pCO2: 37    /  pO2: 64    / HCO3: 30    / Base Excess: 6.2   /  SaO2: 94.5                Radiology:    Physical Exam:  General: WN/WD NAD  Neurology: A&Ox0, contracted   Respiratory: B/L Rhonchi  CV: RRR, S1S2, no murmur  Abdominal: Soft, NT, ND no palpable mass  MSK: No edema, + peripheral pulses, FROM all 4 extremity    Assessment & Plan:  68F PMH TBI, non-verbal at baseline, CVA, sacral ulcer, hypothyroidism 2/2 graves, L breast cancer on tamoxifen presents from [facility] [employer] due to multiple days of reduced responsiveness and fever. Admitted for septic shock likely 2/2 to wound infection, complicated by bacteremia, now being evaluated for hypoxia    Hypoxic resp failure  - Could be due to increased secretions, suction prn as needed and chest PT.  - CXR from 2/10 withj left lower lung haziness s/p Lasix 20 IV X 1 yesterday  - ABG with hypoxemia, placed on high flow o2, will wean off as tolerates.   - Will keep HOB elevated to prevent aspiration  - continue on Cefepime and Flagyl  - Deep suction as needed due to increased secretions  - Will continue to monitor pt.   F/U with primary team in AM.    Odalis Sun NP   [id number]          .

## 2024-02-12 LAB
ANION GAP SERPL CALC-SCNC: 7 MMOL/L — SIGNIFICANT CHANGE UP (ref 5–17)
BUN SERPL-MCNC: 14 MG/DL — SIGNIFICANT CHANGE UP (ref 7–23)
CALCIUM SERPL-MCNC: 8.2 MG/DL — LOW (ref 8.4–10.5)
CHLORIDE SERPL-SCNC: 106 MMOL/L — SIGNIFICANT CHANGE UP (ref 96–108)
CO2 SERPL-SCNC: 28 MMOL/L — SIGNIFICANT CHANGE UP (ref 22–31)
CREAT SERPL-MCNC: <0.3 MG/DL — LOW (ref 0.5–1.3)
EGFR: 114 ML/MIN/1.73M2 — SIGNIFICANT CHANGE UP
GLUCOSE BLDC GLUCOMTR-MCNC: 107 MG/DL — HIGH (ref 70–99)
GLUCOSE BLDC GLUCOMTR-MCNC: 121 MG/DL — HIGH (ref 70–99)
GLUCOSE BLDC GLUCOMTR-MCNC: 128 MG/DL — HIGH (ref 70–99)
GLUCOSE BLDC GLUCOMTR-MCNC: 135 MG/DL — HIGH (ref 70–99)
GLUCOSE BLDC GLUCOMTR-MCNC: 162 MG/DL — HIGH (ref 70–99)
GLUCOSE SERPL-MCNC: 101 MG/DL — HIGH (ref 70–99)
HCT VFR BLD CALC: 31.7 % — LOW (ref 34.5–45)
HGB BLD-MCNC: 9.1 G/DL — LOW (ref 11.5–15.5)
MCHC RBC-ENTMCNC: 27.1 PG — SIGNIFICANT CHANGE UP (ref 27–34)
MCHC RBC-ENTMCNC: 28.7 GM/DL — LOW (ref 32–36)
MCV RBC AUTO: 94.3 FL — SIGNIFICANT CHANGE UP (ref 80–100)
NRBC # BLD: 0 /100 WBCS — SIGNIFICANT CHANGE UP (ref 0–0)
PLATELET # BLD AUTO: 239 K/UL — SIGNIFICANT CHANGE UP (ref 150–400)
POTASSIUM SERPL-MCNC: 4.4 MMOL/L — SIGNIFICANT CHANGE UP (ref 3.5–5.3)
POTASSIUM SERPL-SCNC: 4.4 MMOL/L — SIGNIFICANT CHANGE UP (ref 3.5–5.3)
RBC # BLD: 3.36 M/UL — LOW (ref 3.8–5.2)
RBC # FLD: 19.2 % — HIGH (ref 10.3–14.5)
SODIUM SERPL-SCNC: 141 MMOL/L — SIGNIFICANT CHANGE UP (ref 135–145)
WBC # BLD: 9.2 K/UL — SIGNIFICANT CHANGE UP (ref 3.8–10.5)
WBC # FLD AUTO: 9.2 K/UL — SIGNIFICANT CHANGE UP (ref 3.8–10.5)

## 2024-02-12 PROCEDURE — 99232 SBSQ HOSP IP/OBS MODERATE 35: CPT

## 2024-02-12 PROCEDURE — 99233 SBSQ HOSP IP/OBS HIGH 50: CPT | Mod: GC

## 2024-02-12 RX ORDER — IPRATROPIUM/ALBUTEROL SULFATE 18-103MCG
3 AEROSOL WITH ADAPTER (GRAM) INHALATION EVERY 6 HOURS
Refills: 0 | Status: DISCONTINUED | OUTPATIENT
Start: 2024-02-12 | End: 2024-02-20

## 2024-02-12 RX ORDER — CEFAZOLIN SODIUM 1 G
2000 VIAL (EA) INJECTION ONCE
Refills: 0 | Status: COMPLETED | OUTPATIENT
Start: 2024-02-12 | End: 2024-02-12

## 2024-02-12 RX ORDER — CEFAZOLIN SODIUM 1 G
2000 VIAL (EA) INJECTION EVERY 8 HOURS
Refills: 0 | Status: DISCONTINUED | OUTPATIENT
Start: 2024-02-12 | End: 2024-02-20

## 2024-02-12 RX ORDER — SODIUM CHLORIDE 9 MG/ML
4 INJECTION INTRAMUSCULAR; INTRAVENOUS; SUBCUTANEOUS EVERY 6 HOURS
Refills: 0 | Status: DISCONTINUED | OUTPATIENT
Start: 2024-02-12 | End: 2024-02-20

## 2024-02-12 RX ORDER — CEFAZOLIN SODIUM 1 G
VIAL (EA) INJECTION
Refills: 0 | Status: DISCONTINUED | OUTPATIENT
Start: 2024-02-12 | End: 2024-02-20

## 2024-02-12 RX ADMIN — MIDODRINE HYDROCHLORIDE 10 MILLIGRAM(S): 2.5 TABLET ORAL at 12:26

## 2024-02-12 RX ADMIN — Medication 3 MILLILITER(S): at 17:46

## 2024-02-12 RX ADMIN — TAMOXIFEN CITRATE 20 MILLIGRAM(S): 20 TABLET, FILM COATED ORAL at 12:28

## 2024-02-12 RX ADMIN — SODIUM CHLORIDE 4 MILLILITER(S): 9 INJECTION INTRAMUSCULAR; INTRAVENOUS; SUBCUTANEOUS at 12:26

## 2024-02-12 RX ADMIN — Medication 1 DROP(S): at 22:37

## 2024-02-12 RX ADMIN — CHLORHEXIDINE GLUCONATE 1 APPLICATION(S): 213 SOLUTION TOPICAL at 08:21

## 2024-02-12 RX ADMIN — SENNA PLUS 2 TABLET(S): 8.6 TABLET ORAL at 22:37

## 2024-02-12 RX ADMIN — Medication 100 MILLIGRAM(S): at 12:27

## 2024-02-12 RX ADMIN — MIDODRINE HYDROCHLORIDE 10 MILLIGRAM(S): 2.5 TABLET ORAL at 22:37

## 2024-02-12 RX ADMIN — DROXIDOPA 300 MILLIGRAM(S): 100 CAPSULE ORAL at 06:31

## 2024-02-12 RX ADMIN — Medication 1 APPLICATION(S): at 17:47

## 2024-02-12 RX ADMIN — Medication 100 MILLIGRAM(S): at 12:26

## 2024-02-12 RX ADMIN — DROXIDOPA 300 MILLIGRAM(S): 100 CAPSULE ORAL at 22:37

## 2024-02-12 RX ADMIN — POLYETHYLENE GLYCOL 3350 17 GRAM(S): 17 POWDER, FOR SOLUTION ORAL at 12:26

## 2024-02-12 RX ADMIN — HEPARIN SODIUM 5000 UNIT(S): 5000 INJECTION INTRAVENOUS; SUBCUTANEOUS at 17:46

## 2024-02-12 RX ADMIN — Medication 100 MILLIGRAM(S): at 22:37

## 2024-02-12 RX ADMIN — SERTRALINE 25 MILLIGRAM(S): 25 TABLET, FILM COATED ORAL at 12:27

## 2024-02-12 RX ADMIN — Medication 1 TABLET(S): at 12:27

## 2024-02-12 RX ADMIN — Medication 1 DROP(S): at 12:26

## 2024-02-12 RX ADMIN — Medication 1 MILLIGRAM(S): at 12:27

## 2024-02-12 RX ADMIN — HEPARIN SODIUM 5000 UNIT(S): 5000 INJECTION INTRAVENOUS; SUBCUTANEOUS at 06:30

## 2024-02-12 RX ADMIN — MIDODRINE HYDROCHLORIDE 10 MILLIGRAM(S): 2.5 TABLET ORAL at 06:31

## 2024-02-12 RX ADMIN — Medication 100 MILLIGRAM(S): at 17:46

## 2024-02-12 RX ADMIN — Medication 1 APPLICATION(S): at 06:31

## 2024-02-12 RX ADMIN — DROXIDOPA 300 MILLIGRAM(S): 100 CAPSULE ORAL at 12:27

## 2024-02-12 RX ADMIN — Medication 500 MILLIGRAM(S): at 12:27

## 2024-02-12 RX ADMIN — SODIUM CHLORIDE 4 MILLILITER(S): 9 INJECTION INTRAMUSCULAR; INTRAVENOUS; SUBCUTANEOUS at 17:47

## 2024-02-12 RX ADMIN — Medication 80 MICROGRAM(S): at 22:38

## 2024-02-12 RX ADMIN — Medication 100 MILLIGRAM(S): at 07:09

## 2024-02-12 RX ADMIN — Medication 3 MILLILITER(S): at 12:29

## 2024-02-12 RX ADMIN — CEFEPIME 100 MILLIGRAM(S): 1 INJECTION, POWDER, FOR SOLUTION INTRAMUSCULAR; INTRAVENOUS at 06:29

## 2024-02-12 NOTE — PROGRESS NOTE ADULT - SUBJECTIVE AND OBJECTIVE BOX
DATE OF SERVICE: 02-12-24 @ 10:46    Patient is a 68y old  Female who presents with a chief complaint of Sepsis 2/2 osteomyelitis (11 Feb 2024 16:27)      INTERVAL HISTORY: TERESA non verbal at baseline    REVIEW OF SYSTEMS:  CONSTITUTIONAL: No weakness  EYES/ENT: No visual changes;  No throat pain   NECK: No pain or stiffness  RESPIRATORY: No cough, wheezing; No shortness of breath  CARDIOVASCULAR: No chest pain or palpitations  GASTROINTESTINAL: No abdominal  pain. No nausea, vomiting, or hematemesis  GENITOURINARY: No dysuria, frequency or hematuria  NEUROLOGICAL: No stroke like symptoms  SKIN: No rashes    TELEMETRY Personally reviewed: NSR 80s  	  MEDICATIONS:  droxidopa 300 milliGRAM(s) Oral every 8 hours  midodrine 10 milliGRAM(s) Oral every 8 hours        PHYSICAL EXAM:  T(C): 36.3 (02-12-24 @ 08:13), Max: 36.9 (02-12-24 @ 01:17)  HR: 88 (02-12-24 @ 08:13) (67 - 94)  BP: 110/73 (02-12-24 @ 08:13) (101/76 - 113/73)  RR: 18 (02-12-24 @ 08:13) (17 - 18)  SpO2: 96% (02-12-24 @ 08:13) (95% - 100%)  Wt(kg): --  I&O's Summary    11 Feb 2024 07:01  -  12 Feb 2024 07:00  --------------------------------------------------------  IN: 1940 mL / OUT: 750 mL / NET: 1190 mL          Appearance: In no distress	  HEENT:    PERRL, EOMI	  Cardiovascular:  S1 S2, No JVD  Respiratory: Lungs clear to auscultation	  Gastrointestinal:  Soft, Non-tender, + BS	  Vascularature:  No edema of LE  Psychiatric: Appropriate affect   Neuro: no acute focal deficits                               9.1    9.20  )-----------( 239      ( 12 Feb 2024 07:50 )             31.7     02-12    141  |  106  |  14  ----------------------------<  101<H>  4.4   |  28  |  <0.30<L>    Ca    8.2<L>      12 Feb 2024 07:50  Phos  2.5     02-11  Mg     2.4     02-11          Labs personally reviewed      ASSESSMENT/PLAN: 	  69 yo F with hx TBI (nonverbal, bedbound/contracted at baseline) s/p PEG last admission due to recurrent hypernatremia and chronic malnutrition, CVA, hypothyroidism, L breast cancer (s/p mastectomy tissue expander still in place ) who presented for decreased responsiveness and hypotension, found to have sepsis with MSSA bacteremia, likely source sacral ulcer w/ c/f possible osteomyelitis. Subsequently required ICU care given hypotension 2/2 likely septic shock, requiring pressors.       1. Problem/Plan:  Problem: Hypotension   Septic Shock 2/2 Bacteremia with MSSA.   - Previously in MICU requiring pressors, now off levo   -c/w Midodrine 10mg PO q8h  - c/w droxidopa - decreased to 300 mg q8h    2. Problem/Plan:  Problem: Hypothyroidism.   - Continue levothyroxine  - TSH wnl.    3. Problem/Plan:  Problem: DVT ppx  - Continue subq Heparin            Iolani Behrbom, AG-NP   Rajinder Quinones DO [facility]  Cardiovascular Medicine  [address] [place] [address], [address]  Available through call or text on [employer] TE[employer]  Office: [phone]

## 2024-02-12 NOTE — PROGRESS NOTE ADULT - ATTENDING COMMENTS
1. Acute hypoxemic respiratory failure due to repeated aspiration pneumonia. Pt h/o cva.     High Flow decreased to 40% 02. If tolerated , can trial 6 liters nasal canula     Continue cefazolin and flagyl     Airway clearance devices as tolerated. Not sure she can do to much with these.    2. MSSA bacteremia. Finishing course of Cefazolin.    3. Continue PEG tube feeds.    Case discussed with pt's .

## 2024-02-12 NOTE — PROGRESS NOTE ADULT - SUBJECTIVE AND OBJECTIVE BOX
CHIEF COMPLAINT:    Interval Events:    NAEON. Oxygenation improving, SpO2 100% on 40L HFNC    REVIEW OF SYSTEMS:  unable to obtain given mental status    OBJECTIVE:  ICU Vital Signs Last 24 Hrs  T(C): 36.3 (12 Feb 2024 16:44), Max: 36.9 (12 Feb 2024 01:17)  T(F): 97.4 (12 Feb 2024 16:44), Max: 98.4 (12 Feb 2024 01:17)  HR: 75 (12 Feb 2024 16:44) (67 - 88)  BP: 92/60 (12 Feb 2024 16:44) (92/60 - 113/73)  BP(mean): --  ABP: --  ABP(mean): --  RR: 18 (12 Feb 2024 16:44) (18 - 18)  SpO2: 99% (12 Feb 2024 16:44) (96% - 100%)    O2 Parameters below as of 12 Feb 2024 16:44  Patient On (Oxygen Delivery Method): nasal cannula, high ammy              02-11 @ 07:01  -  02-12 @ 07:00  --------------------------------------------------------  IN: 1940 mL / OUT: 750 mL / NET: 1190 mL      CAPILLARY BLOOD GLUCOSE      POCT Blood Glucose.: 128 mg/dL (12 Feb 2024 17:01)      PHYSICAL EXAM:  General: awake non interactive  HEENT: NC/AT, EOMI b/l, conjunctiva normal, MMM  Lymph Nodes: no cervical LAD  Neck: supple. full range of motion  Respiratory: no accessory muscle use  Cardiovascular: S1 S2 present, RRR, no m/r/g  Abdomen: soft, NT/ND, +BS  Extremities: no c/c/e  Skin: no rashes or lesions noted  Neurological: AAO0  Psychiatry: calm, cooperative      HOSPITAL MEDICATIONS:  MEDICATIONS  (STANDING):  albuterol/ipratropium for Nebulization 3 milliLiter(s) Nebulizer every 6 hours  artificial  tears Solution 1 Drop(s) Both EYES two times a day  ascorbic acid 500 milliGRAM(s) Oral daily  ceFAZolin   IVPB 2000 milliGRAM(s) IV Intermittent every 8 hours  ceFAZolin   IVPB      chlorhexidine 2% Cloths 1 Application(s) Topical <User Schedule>  Dakins Solution - 1/4 Strength 1 Application(s) Topical every 12 hours  droxidopa 300 milliGRAM(s) Oral every 8 hours  folic acid 1 milliGRAM(s) Oral daily  guaiFENesin Oral Liquid (Sugar-Free) 100 milliGRAM(s) Oral every 6 hours  heparin   Injectable 5000 Unit(s) SubCutaneous every 12 hours  insulin lispro (ADMELOG) corrective regimen sliding scale   SubCutaneous every 6 hours  levothyroxine Injectable 80 MICROGram(s) IV Push at bedtime  metroNIDAZOLE  IVPB 500 milliGRAM(s) IV Intermittent every 12 hours  midodrine 10 milliGRAM(s) Oral every 8 hours  multivitamin 1 Tablet(s) Oral daily  polyethylene glycol 3350 17 Gram(s) Oral daily  senna 2 Tablet(s) Oral at bedtime  sertraline 25 milliGRAM(s) Oral daily  sodium chloride 3%  Inhalation 4 milliLiter(s) Inhalation every 6 hours  tamoxifen 20 milliGRAM(s) Oral daily  thiamine 100 milliGRAM(s) Oral daily    MEDICATIONS  (PRN):      LABS:                        9.1    9.20  )-----------( 239      ( 12 Feb 2024 07:50 )             31.7     Hgb Trend: 9.1<--, 9.4<--, 9.1<--, 9.0<--, 8.4<--  02-12    141  |  106  |  14  ----------------------------<  101<H>  4.4   |  28  |  <0.30<L>    Ca    8.2<L>      12 Feb 2024 07:50  Phos  2.5     02-11  Mg     2.4     02-11      Creatinine Trend: <0.30<--, <0.30<--, <0.30<--, <0.30<--, <0.30<--, <0.30<--    Urinalysis Basic - ( 12 Feb 2024 07:50 )    Color: x / Appearance: x / SG: x / pH: x  Gluc: 101 mg/dL / Ketone: x  / Bili: x / Urobili: x   Blood: x / Protein: x / Nitrite: x   Leuk Esterase: x / RBC: x / WBC x   Sq Epi: x / Non Sq Epi: x / Bacteria: x      Arterial Blood Gas:  02-11 @ 02:18  7.51/37/64/30/94.5/6.2  ABG lactate: --        MICROBIOLOGY:       RADIOLOGY:  [ x ] Reviewed and interpreted by me    PULMONARY FUNCTION TESTS:    EKG:

## 2024-02-12 NOTE — PROGRESS NOTE ADULT - ASSESSMENT
Patient is a 68 year old F with hx of TBI, CVA (nonverbal, bedbound at baseline) hypothyroidism, L breast cancer (s/p mastectomy and breast tissue expander, on tamoxifen) who presents from [facility] for several days of lethargy, dec responsiveness, and fever. The patient was recently discharged (1/10/2024) after a prolonged hospitalization for UTI, hypernatremia, FTT, complicated by AHRF 2/2 to aspiration PNA. Now on abx for septic shock with MSSA bactermia , still on oral pressors. Course is now complicated by AHRF requiring HFNC.      Pulm consulted for hypoxemia    #recommendations  patient likely with recurrent aspiration leading to hypoxemia,   continue with oral care  aggressive airway clearance  airway clearance: humidified oxygen, standing guaifenesin, duonebs q6h, hyper sal q6h, chest PT q6 h, aerobika/acapella q6 h, deep NT suction q6 hr, chest vest q12h  maintain euvolemia  titrate oxygen to O2 >88%, use humidified oxygen, can switch to nasal cannula 6L and assess response  incentive spirometry, OOB to chair, PT/OT  aspiration precautions as necessary  DVT ppx as tolerated  airway clearance as necessary

## 2024-02-12 NOTE — PROGRESS NOTE ADULT - SUBJECTIVE AND OBJECTIVE BOX
Follow Up:  sepsis    Interval History/ROS: pt afebrile, normal WBC transferred to floor but had some hypoxia due to possible aspiration, the cefepime level also elevated    ROS:    Unobtainable because: non verbal        Allergies  Tapazole (Hives)        ANTIMICROBIALS:  ceFAZolin   IVPB 2000 once  ceFAZolin   IVPB 2000 every 8 hours  ceFAZolin   IVPB    metroNIDAZOLE  IVPB 500 every 12 hours      OTHER MEDS:  albuterol/ipratropium for Nebulization 3 milliLiter(s) Nebulizer every 6 hours  artificial  tears Solution 1 Drop(s) Both EYES two times a day  ascorbic acid 500 milliGRAM(s) Oral daily  chlorhexidine 2% Cloths 1 Application(s) Topical <User Schedule>  Dakins Solution - 1/4 Strength 1 Application(s) Topical every 12 hours  droxidopa 300 milliGRAM(s) Oral every 8 hours  folic acid 1 milliGRAM(s) Oral daily  guaiFENesin Oral Liquid (Sugar-Free) 100 milliGRAM(s) Oral every 6 hours  heparin   Injectable 5000 Unit(s) SubCutaneous every 12 hours  insulin lispro (ADMELOG) corrective regimen sliding scale   SubCutaneous every 6 hours  levothyroxine Injectable 80 MICROGram(s) IV Push at bedtime  midodrine 10 milliGRAM(s) Oral every 8 hours  multivitamin 1 Tablet(s) Oral daily  polyethylene glycol 3350 17 Gram(s) Oral daily  senna 2 Tablet(s) Oral at bedtime  sertraline 25 milliGRAM(s) Oral daily  sodium chloride 3%  Inhalation 4 milliLiter(s) Inhalation every 6 hours  tamoxifen 20 milliGRAM(s) Oral daily  thiamine 100 milliGRAM(s) Oral daily      Vital Signs Last 24 Hrs  T(C): 36.3 (12 Feb 2024 08:13), Max: 36.9 (12 Feb 2024 01:17)  T(F): 97.3 (12 Feb 2024 08:13), Max: 98.4 (12 Feb 2024 01:17)  HR: 88 (12 Feb 2024 08:13) (67 - 94)  BP: 110/73 (12 Feb 2024 08:13) (101/76 - 113/73)  BP(mean): --  RR: 18 (12 Feb 2024 09:46) (17 - 18)  SpO2: 96% (12 Feb 2024 09:46) (95% - 100%)    Parameters below as of 12 Feb 2024 09:46  Patient On (Oxygen Delivery Method): nasal cannula, high flow  O2 Flow (L/min): 50  O2 Concentration (%): 60    Physical Exam:  General:    cachectic  Respiratory:   clear  abd:   soft, PEG with brownish drainage around it, not tender  :   no avila  Musculoskeletal : contracted, no joint swelling, no edema  Skin: multiple wounds including sacral with necrotic edge, back with linear eschar  vascular: no phlebitis                          9.1    9.20  )-----------( 239      ( 12 Feb 2024 07:50 )             31.7       02-12    141  |  106  |  14  ----------------------------<  101<H>  4.4   |  28  |  <0.30<L>    Ca    8.2<L>      12 Feb 2024 07:50  Phos  2.5     02-11  Mg     2.4     02-11        Urinalysis Basic - ( 12 Feb 2024 07:50 )    Color: x / Appearance: x / SG: x / pH: x  Gluc: 101 mg/dL / Ketone: x  / Bili: x / Urobili: x   Blood: x / Protein: x / Nitrite: x   Leuk Esterase: x / RBC: x / WBC x   Sq Epi: x / Non Sq Epi: x / Bacteria: x        MICROBIOLOGY:  v  .Genital Vaginal  02-03-24   Normal genital forrest isolated  --  --      .Blood Blood-Peripheral  02-02-24   No growth at 5 days  --  --      .Blood Blood-Peripheral  02-02-24   No growth at 5 days  --  --      .Blood Blood-Peripheral  02-02-24   No growth at 5 days  --  --      .Blood Blood-Peripheral  02-01-24   No growth at 5 days  --  --      Clean Catch Clean Catch (Midstream)  01-30-24   <10,000 CFU/mL Normal Urogenital Forrest  --  --      .Blood Blood-Peripheral  01-30-24   Growth in aerobic and anaerobic bottles: Staphylococcus aureus  Direct identification is available within approximately 3-5  hours either by Blood Panel Multiplexed PCR or Direct  MALDI-TOF. Details: [email or web address]  --  Blood Culture PCR  Staphylococcus aureus      .Blood Blood-Peripheral  01-30-24   No growth at 5 days  --  --      Catheterized Catheterized  01-18-24   <10,000 CFU/mL Normal Urogenital Forrest  --  --                RADIOLOGY:  Images independently visualized and reviewed personally, findings as below  < from: Xray Chest 1 View-PORTABLE IMMEDIATE (Xray Chest 1 View-PORTABLE IMMEDIATE .) (02.10.24 @ 03:31) >  IMPRESSION:  Nonspecific left lower lung haziness    < end of copied text >  < from: MR Lumbar Spine w/ IV Cont (02.01.24 @ 08:56) >  IMPRESSION:  Prominent sacral decubitus ulcer extending to the coccygeal tip without   evidence of underlying osteomyelitis or discrete fluid collections in the   surrounding soft tissues.    Sinus tract is seen extending from the skin surface to the L3 spinous   process which may represent additional ulcer. Recommend correlation with   clinical aspects of the case. No evidence of osteomyelitis in the   underlying L3 spinous process.    Multilevel chronic compression deformities and lumbar spondylosis as     < end of copied text >  < from: TTE Limited W or WO Ultrasound Enhancing Agent (02.03.24 @ 13:20) >  CONCLUSIONS:      1. Non-diagnostic image quality.   2. Unable to evaluate left ventricular ejection fraction.    < end of copied text >

## 2024-02-12 NOTE — PROGRESS NOTE ADULT - SUBJECTIVE AND OBJECTIVE BOX
PROGRESS NOTE:   Authored by Dr. Ciera Phillips MD, Available on MS Teams    Patient is a 68y old  Female who presents with a chief complaint of Sepsis 2/2 osteomyelitis (12 Feb 2024 11:30)      SUBJECTIVE / OVERNIGHT EVENTS: No acute events overnight.    ADDITIONAL REVIEW OF SYSTEMS:    MEDICATIONS  (STANDING):  albuterol/ipratropium for Nebulization 3 milliLiter(s) Nebulizer every 6 hours  artificial  tears Solution 1 Drop(s) Both EYES two times a day  ascorbic acid 500 milliGRAM(s) Oral daily  ceFAZolin   IVPB 2000 milliGRAM(s) IV Intermittent every 8 hours  ceFAZolin   IVPB      chlorhexidine 2% Cloths 1 Application(s) Topical <User Schedule>  Dakins Solution - 1/4 Strength 1 Application(s) Topical every 12 hours  droxidopa 300 milliGRAM(s) Oral every 8 hours  folic acid 1 milliGRAM(s) Oral daily  guaiFENesin Oral Liquid (Sugar-Free) 100 milliGRAM(s) Oral every 6 hours  heparin   Injectable 5000 Unit(s) SubCutaneous every 12 hours  insulin lispro (ADMELOG) corrective regimen sliding scale   SubCutaneous every 6 hours  levothyroxine Injectable 80 MICROGram(s) IV Push at bedtime  metroNIDAZOLE  IVPB 500 milliGRAM(s) IV Intermittent every 12 hours  midodrine 10 milliGRAM(s) Oral every 8 hours  multivitamin 1 Tablet(s) Oral daily  polyethylene glycol 3350 17 Gram(s) Oral daily  senna 2 Tablet(s) Oral at bedtime  sertraline 25 milliGRAM(s) Oral daily  sodium chloride 3%  Inhalation 4 milliLiter(s) Inhalation every 6 hours  tamoxifen 20 milliGRAM(s) Oral daily  thiamine 100 milliGRAM(s) Oral daily    MEDICATIONS  (PRN):      CAPILLARY BLOOD GLUCOSE      POCT Blood Glucose.: 135 mg/dL (12 Feb 2024 12:19)  POCT Blood Glucose.: 107 mg/dL (12 Feb 2024 06:39)  POCT Blood Glucose.: 121 mg/dL (12 Feb 2024 01:27)  POCT Blood Glucose.: 162 mg/dL (12 Feb 2024 00:23)  POCT Blood Glucose.: 131 mg/dL (11 Feb 2024 17:18)    I&O's Summary    11 Feb 2024 07:01  -  12 Feb 2024 07:00  --------------------------------------------------------  IN: 1940 mL / OUT: 750 mL / NET: 1190 mL        PHYSICAL EXAM:  Vital Signs Last 24 Hrs  T(C): 36.3 (12 Feb 2024 13:59), Max: 36.9 (12 Feb 2024 01:17)  T(F): 97.3 (12 Feb 2024 13:59), Max: 98.4 (12 Feb 2024 01:17)  HR: 73 (12 Feb 2024 13:59) (67 - 88)  BP: 107/54 (12 Feb 2024 13:59) (101/76 - 113/73)  BP(mean): --  RR: 18 (12 Feb 2024 13:59) (17 - 18)  SpO2: 100% (12 Feb 2024 13:59) (96% - 100%)    Parameters below as of 12 Feb 2024 13:59  Patient On (Oxygen Delivery Method): nasal cannula, high flow  O2 Flow (L/min): 50  O2 Concentration (%): 60    CONSTITUTIONAL: ill appearing  RESPIRATORY: Normal respiratory effort; no wheezing  CARDIOVASCULAR: Regular rate and rhythm, normal S1 and S2, no murmur/rub/gallop; No lower extremity edema  ABDOMEN: Nontender to palpation, normoactive bowel sounds, no rebound/guarding, +PEG  MUSCLOSKELETAL: contracted extremities  PSYCH: A+O x0    LABS:                        9.1    9.20  )-----------( 239      ( 12 Feb 2024 07:50 )             31.7     02-12    141  |  106  |  14  ----------------------------<  101<H>  4.4   |  28  |  <0.30<L>    Ca    8.2<L>      12 Feb 2024 07:50  Phos  2.5     02-11  Mg     2.4     02-11            Urinalysis Basic - ( 12 Feb 2024 07:50 )    Color: x / Appearance: x / SG: x / pH: x  Gluc: 101 mg/dL / Ketone: x  / Bili: x / Urobili: x   Blood: x / Protein: x / Nitrite: x   Leuk Esterase: x / RBC: x / WBC x   Sq Epi: x / Non Sq Epi: x / Bacteria: x

## 2024-02-12 NOTE — PROGRESS NOTE ADULT - ASSESSMENT
68 f with  TBI, SAH, non-verbal at baseline, CVA, sacral ulcer, graves, L breast ca s/p mastectomy on tamoxifen, aspiration s/p PEG, multiple decubiti and sacral osteo, sent from NH for  multiple days of less responsiveness and fever, here febrile to 103.4, tachycardic, hypotensive to 70s presents from [facility] due to multiple days of reduced responsiveness and fever. Found to have leukocytosis, tachycardia and hypotension here as well. Difficult to assess, contracted and non-verbal.  has multiple wound with necrotic edge  WBC: 14, Na: 151, u/a with 17 WBC  CT: no PE, sacral decub to the level of sacrum and ?osteo      fever, tachycardia, hypotension, leukocytosis, transaminitis, hypernatremia septic shock, due to MSSA bacteremia, ? wound related   has multiple wound which have slough but no jim purulent drainage and the MRI did not show osteo or abscess, just a sinus tract extending from L3  first blood cx had only 1/4 MSSA and repeat blood cx negative but pt deteriorated, TTE was not able to evaluate any valves due to contractures  * switched to cefepime 2 q 8 and flagyl 500 q 12 on 2/5, now already had 7 days, afebrile, normal WBC and the cefepime level elevated, so will discontinue cefepime and switch to cefazolin 2 q8 to finish the 4 week course of MSSA bacteremia, post negative blood cx through 2/29  * monitor CBC/diff and CMP  * continue wound care  * aspiration precaultions    The above assessment and plan was discussed with the primary team    Katherin Teixeira MD  contact on teams  After 5pm and on weekends call [phone]

## 2024-02-13 LAB
ALBUMIN SERPL ELPH-MCNC: 3.4 G/DL — SIGNIFICANT CHANGE UP (ref 3.3–5)
ALP SERPL-CCNC: 88 U/L — SIGNIFICANT CHANGE UP (ref 40–120)
ALT FLD-CCNC: 12 U/L — SIGNIFICANT CHANGE UP (ref 10–45)
ANION GAP SERPL CALC-SCNC: 11 MMOL/L — SIGNIFICANT CHANGE UP (ref 5–17)
APTT BLD: 27.4 SEC — SIGNIFICANT CHANGE UP (ref 24.5–35.6)
AST SERPL-CCNC: 19 U/L — SIGNIFICANT CHANGE UP (ref 10–40)
BASE EXCESS BLDV CALC-SCNC: 4 MMOL/L — HIGH (ref -2–3)
BASOPHILS # BLD AUTO: 0.01 K/UL — SIGNIFICANT CHANGE UP (ref 0–0.2)
BASOPHILS NFR BLD AUTO: 0.1 % — SIGNIFICANT CHANGE UP (ref 0–2)
BILIRUB SERPL-MCNC: 0.5 MG/DL — SIGNIFICANT CHANGE UP (ref 0.2–1.2)
BUN SERPL-MCNC: 17 MG/DL — SIGNIFICANT CHANGE UP (ref 7–23)
CA-I SERPL-SCNC: 1.12 MMOL/L — LOW (ref 1.15–1.33)
CALCIUM SERPL-MCNC: 8.6 MG/DL — SIGNIFICANT CHANGE UP (ref 8.4–10.5)
CHLORIDE BLDV-SCNC: 103 MMOL/L — SIGNIFICANT CHANGE UP (ref 96–108)
CHLORIDE SERPL-SCNC: 104 MMOL/L — SIGNIFICANT CHANGE UP (ref 96–108)
CO2 BLDV-SCNC: 31 MMOL/L — HIGH (ref 22–26)
CO2 SERPL-SCNC: 26 MMOL/L — SIGNIFICANT CHANGE UP (ref 22–31)
CREAT SERPL-MCNC: <0.3 MG/DL — LOW (ref 0.5–1.3)
EGFR: 114 ML/MIN/1.73M2 — SIGNIFICANT CHANGE UP
EOSINOPHIL # BLD AUTO: 0.07 K/UL — SIGNIFICANT CHANGE UP (ref 0–0.5)
EOSINOPHIL NFR BLD AUTO: 0.6 % — SIGNIFICANT CHANGE UP (ref 0–6)
GAS PNL BLDV: 138 MMOL/L — SIGNIFICANT CHANGE UP (ref 136–145)
GAS PNL BLDV: SIGNIFICANT CHANGE UP
GAS PNL BLDV: SIGNIFICANT CHANGE UP
GLUCOSE BLDC GLUCOMTR-MCNC: 101 MG/DL — HIGH (ref 70–99)
GLUCOSE BLDC GLUCOMTR-MCNC: 105 MG/DL — HIGH (ref 70–99)
GLUCOSE BLDC GLUCOMTR-MCNC: 116 MG/DL — HIGH (ref 70–99)
GLUCOSE BLDC GLUCOMTR-MCNC: 120 MG/DL — HIGH (ref 70–99)
GLUCOSE BLDV-MCNC: 119 MG/DL — HIGH (ref 70–99)
GLUCOSE SERPL-MCNC: 124 MG/DL — HIGH (ref 70–99)
HCO3 BLDV-SCNC: 30 MMOL/L — HIGH (ref 22–29)
HCT VFR BLD CALC: 36 % — SIGNIFICANT CHANGE UP (ref 34.5–45)
HCT VFR BLDA CALC: 32 % — LOW (ref 34.5–46.5)
HGB BLD CALC-MCNC: 10.6 G/DL — LOW (ref 11.7–16.1)
HGB BLD-MCNC: 10.6 G/DL — LOW (ref 11.5–15.5)
HOROWITZ INDEX BLDV+IHG-RTO: SIGNIFICANT CHANGE UP
IMM GRANULOCYTES NFR BLD AUTO: 0.6 % — SIGNIFICANT CHANGE UP (ref 0–0.9)
INR BLD: 1.19 RATIO — HIGH (ref 0.85–1.18)
LACTATE BLDV-MCNC: 2.1 MMOL/L — HIGH (ref 0.5–2)
LYMPHOCYTES # BLD AUTO: 1.32 K/UL — SIGNIFICANT CHANGE UP (ref 1–3.3)
LYMPHOCYTES # BLD AUTO: 11.7 % — LOW (ref 13–44)
MAGNESIUM SERPL-MCNC: 2.4 MG/DL — SIGNIFICANT CHANGE UP (ref 1.6–2.6)
MCHC RBC-ENTMCNC: 27.7 PG — SIGNIFICANT CHANGE UP (ref 27–34)
MCHC RBC-ENTMCNC: 29.4 GM/DL — LOW (ref 32–36)
MCV RBC AUTO: 94.2 FL — SIGNIFICANT CHANGE UP (ref 80–100)
MONOCYTES # BLD AUTO: 0.6 K/UL — SIGNIFICANT CHANGE UP (ref 0–0.9)
MONOCYTES NFR BLD AUTO: 5.3 % — SIGNIFICANT CHANGE UP (ref 2–14)
NEUTROPHILS # BLD AUTO: 9.2 K/UL — HIGH (ref 1.8–7.4)
NEUTROPHILS NFR BLD AUTO: 81.7 % — HIGH (ref 43–77)
NRBC # BLD: 0 /100 WBCS — SIGNIFICANT CHANGE UP (ref 0–0)
PCO2 BLDV: 49 MMHG — HIGH (ref 39–42)
PH BLDV: 7.39 — SIGNIFICANT CHANGE UP (ref 7.32–7.43)
PHOSPHATE SERPL-MCNC: 2.4 MG/DL — LOW (ref 2.5–4.5)
PLATELET # BLD AUTO: 250 K/UL — SIGNIFICANT CHANGE UP (ref 150–400)
PO2 BLDV: 24 MMHG — LOW (ref 25–45)
POTASSIUM BLDV-SCNC: 4.6 MMOL/L — SIGNIFICANT CHANGE UP (ref 3.5–5.1)
POTASSIUM SERPL-MCNC: 4.6 MMOL/L — SIGNIFICANT CHANGE UP (ref 3.5–5.3)
POTASSIUM SERPL-SCNC: 4.6 MMOL/L — SIGNIFICANT CHANGE UP (ref 3.5–5.3)
PROT SERPL-MCNC: 6.7 G/DL — SIGNIFICANT CHANGE UP (ref 6–8.3)
PROTHROM AB SERPL-ACNC: 12.4 SEC — SIGNIFICANT CHANGE UP (ref 9.5–13)
RBC # BLD: 3.82 M/UL — SIGNIFICANT CHANGE UP (ref 3.8–5.2)
RBC # FLD: 19 % — HIGH (ref 10.3–14.5)
SAO2 % BLDV: 34.1 % — LOW (ref 67–88)
SODIUM SERPL-SCNC: 141 MMOL/L — SIGNIFICANT CHANGE UP (ref 135–145)
WBC # BLD: 11.27 K/UL — HIGH (ref 3.8–10.5)
WBC # FLD AUTO: 11.27 K/UL — HIGH (ref 3.8–10.5)

## 2024-02-13 PROCEDURE — 71045 X-RAY EXAM CHEST 1 VIEW: CPT | Mod: 26

## 2024-02-13 PROCEDURE — 99232 SBSQ HOSP IP/OBS MODERATE 35: CPT

## 2024-02-13 PROCEDURE — 99232 SBSQ HOSP IP/OBS MODERATE 35: CPT | Mod: GC

## 2024-02-13 RX ORDER — HYDRALAZINE HCL 50 MG
10 TABLET ORAL ONCE
Refills: 0 | Status: DISCONTINUED | OUTPATIENT
Start: 2024-02-13 | End: 2024-02-13

## 2024-02-13 RX ORDER — ACETYLCYSTEINE 200 MG/ML
4 VIAL (ML) MISCELLANEOUS ONCE
Refills: 0 | Status: COMPLETED | OUTPATIENT
Start: 2024-02-13 | End: 2024-02-13

## 2024-02-13 RX ORDER — SODIUM CHLORIDE 9 MG/ML
250 INJECTION INTRAMUSCULAR; INTRAVENOUS; SUBCUTANEOUS ONCE
Refills: 0 | Status: COMPLETED | OUTPATIENT
Start: 2024-02-13 | End: 2024-02-13

## 2024-02-13 RX ORDER — SODIUM,POTASSIUM PHOSPHATES 278-250MG
1 POWDER IN PACKET (EA) ORAL THREE TIMES A DAY
Refills: 0 | Status: COMPLETED | OUTPATIENT
Start: 2024-02-13 | End: 2024-02-14

## 2024-02-13 RX ADMIN — TAMOXIFEN CITRATE 20 MILLIGRAM(S): 20 TABLET, FILM COATED ORAL at 13:08

## 2024-02-13 RX ADMIN — CHLORHEXIDINE GLUCONATE 1 APPLICATION(S): 213 SOLUTION TOPICAL at 13:04

## 2024-02-13 RX ADMIN — Medication 100 MILLIGRAM(S): at 13:09

## 2024-02-13 RX ADMIN — Medication 100 MILLIGRAM(S): at 18:34

## 2024-02-13 RX ADMIN — SODIUM CHLORIDE 4 MILLILITER(S): 9 INJECTION INTRAMUSCULAR; INTRAVENOUS; SUBCUTANEOUS at 00:54

## 2024-02-13 RX ADMIN — MIDODRINE HYDROCHLORIDE 10 MILLIGRAM(S): 2.5 TABLET ORAL at 22:55

## 2024-02-13 RX ADMIN — Medication 1 APPLICATION(S): at 07:18

## 2024-02-13 RX ADMIN — HEPARIN SODIUM 5000 UNIT(S): 5000 INJECTION INTRAVENOUS; SUBCUTANEOUS at 06:34

## 2024-02-13 RX ADMIN — SERTRALINE 25 MILLIGRAM(S): 25 TABLET, FILM COATED ORAL at 13:08

## 2024-02-13 RX ADMIN — DROXIDOPA 300 MILLIGRAM(S): 100 CAPSULE ORAL at 13:09

## 2024-02-13 RX ADMIN — Medication 1 MILLIGRAM(S): at 13:09

## 2024-02-13 RX ADMIN — Medication 4 MILLILITER(S): at 18:32

## 2024-02-13 RX ADMIN — POLYETHYLENE GLYCOL 3350 17 GRAM(S): 17 POWDER, FOR SOLUTION ORAL at 13:07

## 2024-02-13 RX ADMIN — Medication 100 MILLIGRAM(S): at 22:56

## 2024-02-13 RX ADMIN — Medication 1 TABLET(S): at 13:08

## 2024-02-13 RX ADMIN — HEPARIN SODIUM 5000 UNIT(S): 5000 INJECTION INTRAVENOUS; SUBCUTANEOUS at 18:03

## 2024-02-13 RX ADMIN — Medication 3 MILLILITER(S): at 00:56

## 2024-02-13 RX ADMIN — Medication 100 MILLIGRAM(S): at 07:16

## 2024-02-13 RX ADMIN — Medication 100 MILLIGRAM(S): at 00:54

## 2024-02-13 RX ADMIN — Medication 1 PACKET(S): at 22:56

## 2024-02-13 RX ADMIN — Medication 1 PACKET(S): at 13:09

## 2024-02-13 RX ADMIN — DROXIDOPA 300 MILLIGRAM(S): 100 CAPSULE ORAL at 22:55

## 2024-02-13 RX ADMIN — Medication 100 MILLIGRAM(S): at 13:10

## 2024-02-13 RX ADMIN — Medication 3 MILLILITER(S): at 13:06

## 2024-02-13 RX ADMIN — Medication 1 DROP(S): at 13:04

## 2024-02-13 RX ADMIN — Medication 500 MILLIGRAM(S): at 13:08

## 2024-02-13 RX ADMIN — SODIUM CHLORIDE 4 MILLILITER(S): 9 INJECTION INTRAMUSCULAR; INTRAVENOUS; SUBCUTANEOUS at 18:07

## 2024-02-13 RX ADMIN — Medication 100 MILLIGRAM(S): at 13:08

## 2024-02-13 RX ADMIN — SENNA PLUS 2 TABLET(S): 8.6 TABLET ORAL at 22:55

## 2024-02-13 RX ADMIN — Medication 3 MILLILITER(S): at 18:02

## 2024-02-13 RX ADMIN — Medication 80 MICROGRAM(S): at 22:55

## 2024-02-13 RX ADMIN — SODIUM CHLORIDE 4 MILLILITER(S): 9 INJECTION INTRAMUSCULAR; INTRAVENOUS; SUBCUTANEOUS at 06:32

## 2024-02-13 RX ADMIN — Medication 100 MILLIGRAM(S): at 22:57

## 2024-02-13 RX ADMIN — Medication 100 MILLIGRAM(S): at 07:17

## 2024-02-13 RX ADMIN — SODIUM CHLORIDE 500 MILLILITER(S): 9 INJECTION INTRAMUSCULAR; INTRAVENOUS; SUBCUTANEOUS at 06:31

## 2024-02-13 RX ADMIN — Medication 1 APPLICATION(S): at 18:02

## 2024-02-13 RX ADMIN — Medication 100 MILLIGRAM(S): at 18:04

## 2024-02-13 RX ADMIN — MIDODRINE HYDROCHLORIDE 10 MILLIGRAM(S): 2.5 TABLET ORAL at 13:08

## 2024-02-13 RX ADMIN — Medication 3 MILLILITER(S): at 06:32

## 2024-02-13 RX ADMIN — SODIUM CHLORIDE 4 MILLILITER(S): 9 INJECTION INTRAMUSCULAR; INTRAVENOUS; SUBCUTANEOUS at 13:07

## 2024-02-13 NOTE — PROGRESS NOTE ADULT - SUBJECTIVE AND OBJECTIVE BOX
DATE OF SERVICE: 02-13-24 @ 08:39    Patient is a 68y old  Female who presents with a chief complaint of Sepsis 2/2 osteomyelitis (12 Feb 2024 16:25)      INTERVAL HISTORY:     REVIEW OF SYSTEMS:  CONSTITUTIONAL: No weakness  EYES/ENT: No visual changes;  No throat pain   NECK: No pain or stiffness  RESPIRATORY: No cough, wheezing; No shortness of breath  CARDIOVASCULAR: No chest pain or palpitations  GASTROINTESTINAL: No abdominal  pain. No nausea, vomiting, or hematemesis  GENITOURINARY: No dysuria, frequency or hematuria  NEUROLOGICAL: No stroke like symptoms  SKIN: No rashes    TELEMETRY Personally reviewed: SR 60-80  	  MEDICATIONS:  droxidopa 300 milliGRAM(s) Oral every 8 hours  midodrine 10 milliGRAM(s) Oral every 8 hours        PHYSICAL EXAM:  T(C): 36.6 (02-13-24 @ 04:45), Max: 37.1 (02-13-24 @ 01:24)  HR: 74 (02-13-24 @ 05:34) (69 - 93)  BP: 96/60 (02-13-24 @ 04:45) (92/60 - 107/54)  RR: 18 (02-13-24 @ 06:13) (16 - 18)  SpO2: 100% (02-13-24 @ 06:13) (95% - 100%)  Wt(kg): --  I&O's Summary    12 Feb 2024 07:01  -  13 Feb 2024 07:00  --------------------------------------------------------  IN: 1260 mL / OUT: 150 mL / NET: 1110 mL          Appearance: In no distress	  HEENT:    PERRL, EOMI	  Cardiovascular:  S1 S2, No JVD  Respiratory: Lungs clear to auscultation	  Gastrointestinal:  Soft, Non-tender, + BS	  Vascularature:  No edema of LE  Psychiatric: Appropriate affect   Neuro: no acute focal deficits                               10.6   11.27 )-----------( 250      ( 13 Feb 2024 06:14 )             36.0     02-13    141  |  104  |  17  ----------------------------<  124<H>  4.6   |  26  |  <0.30<L>    Ca    8.6      13 Feb 2024 06:14  Phos  2.4     02-13  Mg     2.4     02-13    TPro  6.7  /  Alb  3.4  /  TBili  0.5  /  DBili  x   /  AST  19  /  ALT  12  /  AlkPhos  88  02-13        Labs personally reviewed      ASSESSMENT/PLAN: 	  69 yo F with hx TBI (nonverbal, bedbound/contracted at baseline) s/p PEG last admission due to recurrent hypernatremia and chronic malnutrition, CVA, hypothyroidism, L breast cancer (s/p mastectomy tissue expander still in place ) who presented for decreased responsiveness and hypotension, found to have sepsis with MSSA bacteremia, likely source sacral ulcer w/ c/f possible osteomyelitis. Subsequently required ICU care given hypotension 2/2 likely septic shock, requiring pressors.       1. Problem/Plan:  Problem: Hypotension   Septic Shock 2/2 Bacteremia with MSSA.   - Previously in MICU requiring pressors, now off levo   -c/w Midodrine 10mg PO q8h  - c/w droxidopa - decreased to 300 mg q8h    2. Problem/Plan:  Problem: Hypothyroidism.   - Continue levothyroxine  - TSH wnl.    3. Problem/Plan:  Problem: DVT ppx  - Continue subq Heparin            DAVIDA Warren DO [facility]  Cardiovascular Medicine  [address] [place] [address], [address]  Available through call or text on [employer] TE[employer]  Office: [phone]   DATE OF SERVICE: 02-13-24 @ 08:39    Patient is a 68y old  Female who presents with a chief complaint of Sepsis 2/2 osteomyelitis (12 Feb 2024 16:25)      INTERVAL HISTORY:     REVIEW OF SYSTEMS:  CONSTITUTIONAL: No weakness  EYES/ENT: No visual changes;  No throat pain   NECK: No pain or stiffness  RESPIRATORY: No cough, wheezing; No shortness of breath  CARDIOVASCULAR: No chest pain or palpitations  GASTROINTESTINAL: No abdominal  pain. No nausea, vomiting, or hematemesis  GENITOURINARY: No dysuria, frequency or hematuria  NEUROLOGICAL: No stroke like symptoms  SKIN: No rashes    TELEMETRY Personally reviewed: SR 60-80  	  MEDICATIONS:  droxidopa 300 milliGRAM(s) Oral every 8 hours  midodrine 10 milliGRAM(s) Oral every 8 hours        PHYSICAL EXAM:  T(C): 36.6 (02-13-24 @ 04:45), Max: 37.1 (02-13-24 @ 01:24)  HR: 74 (02-13-24 @ 05:34) (69 - 93)  BP: 96/60 (02-13-24 @ 04:45) (92/60 - 107/54)  RR: 18 (02-13-24 @ 06:13) (16 - 18)  SpO2: 100% (02-13-24 @ 06:13) (95% - 100%)  Wt(kg): --  I&O's Summary    12 Feb 2024 07:01  -  13 Feb 2024 07:00  --------------------------------------------------------  IN: 1260 mL / OUT: 150 mL / NET: 1110 mL          Appearance: In no distress	  HEENT:    PERRL, EOMI	  Cardiovascular:  S1 S2, No JVD  Respiratory: Lungs clear to auscultation	  Gastrointestinal:  Soft, Non-tender, + BS	  Vascularature:  No edema of LE  Psychiatric: Appropriate affect   Neuro: no acute focal deficits                               10.6   11.27 )-----------( 250      ( 13 Feb 2024 06:14 )             36.0     02-13    141  |  104  |  17  ----------------------------<  124<H>  4.6   |  26  |  <0.30<L>    Ca    8.6      13 Feb 2024 06:14  Phos  2.4     02-13  Mg     2.4     02-13    TPro  6.7  /  Alb  3.4  /  TBili  0.5  /  DBili  x   /  AST  19  /  ALT  12  /  AlkPhos  88  02-13        Labs personally reviewed      ASSESSMENT/PLAN: 	  67 yo F with hx TBI (nonverbal, bedbound/contracted at baseline) s/p PEG last admission due to recurrent hypernatremia and chronic malnutrition, CVA, hypothyroidism, L breast cancer (s/p mastectomy tissue expander still in place ) who presented for decreased responsiveness and hypotension, found to have sepsis with MSSA bacteremia, likely source sacral ulcer w/ c/f possible osteomyelitis. Subsequently required ICU care given hypotension 2/2 likely septic shock, requiring pressors.       1. Problem/Plan:  Problem: Hypotension   Septic Shock 2/2 Bacteremia with MSSA.   - Previously in MICU requiring pressors, now off levo   -c/w Midodrine 10mg PO q8h  - c/w droxidopa - decreased to 300 mg q8h    2. Problem/Plan:  Problem: Hypothyroidism.   - Continue levothyroxine  - TSH wnl.    3. Problem/Plan:  Problem: DVT ppx  - Continue subq Heparin            DAVIDA Warren DO [facility]  Cardiovascular Medicine  [address] [place] [address], [address]  Available through call or text on [employer] TEAM[employer]  Office: [phone]

## 2024-02-13 NOTE — PROGRESS NOTE ADULT - ASSESSMENT
Patient is a 68 year old F with hx of TBI, CVA (nonverbal, bedbound at baseline) hypothyroidism, L breast cancer (s/p mastectomy and breast tissue expander, on tamoxifen) who presents from [facility] for several days of lethargy, dec responsiveness, and fever. The patient was recently discharged (1/10/2024) after a prolonged hospitalization for UTI, hypernatremia, FTT, complicated by AHRF 2/2 to aspiration PNA. Now on abx for septic shock with MSSA bactermia , still on oral pressors. Course is now complicated by AHRF requiring HFNC.      Pulm consulted for hypoxemia    #recommendations  patient likely with recurrent aspiration leading to hypoxemia,   continue with oral care  aggressive airway clearance  airway clearance: humidified oxygen, standing guaifenesin, duonebs q6h, hyper sal q6h, chest PT q6 h, aerobika/acapella q6 h, deep NT suction q6 hr, chest vest q12h  maintain euvolemia  titrate oxygen to O2 >88%, use humidified oxygen, can switch to nasal cannula 6L and assess response  incentive spirometry, OOB to chair, PT/OT  aspiration precautions as necessary  DVT ppx as tolerated  airway clearance as necessary    Patient will likely have recurrent desaturation events due to recurrent aspiration and general frailty, despite pulmonary toilet as able.

## 2024-02-13 NOTE — CHART NOTE - NSCHARTNOTEFT_GEN_A_CORE
Nutrition Follow Up Note  Patient seen for: Malnutrition and Tube feeding Follow Up    Chart reviewed, events noted.    Source: [] Patient       [x] Current Medical Record       [X] RN        [] Family at bedside       [] Other:    -If unable to interview patient: [] Trach/Vent/BiPAP  [X] Disoriented/confused/inappropriate to interview    Diet Order:   Diet, NPO with Tube Feed:   Tube Feeding Modality: Gastrostomy  Glucerna 1.5 Vidal (GLUCERNA1.5RTH)  Total Volume for 24 Hours (mL): 990  Continuous  Starting Tube Feed Rate {mL per Hour}: 10  Increase Tube Feed Rate by (mL): 10     Every 4 hours  Until Goal Tube Feed Rate (mL per Hour): 55  Tube Feed Duration (in Hours): 18  Tube Feed Start Time: 07:30   Frequency: Every 4 Hours (24)    - Is current order appropriate/adequate? [X] Yes  []  No:     - Current Enteral Provision is providin ml total volume, 1485 kcal, 82 gm pro, 751 ml H20    Nutrition-related concerns:  - Pt s/p RRT for Hypoxia on , Tube feedings being held   - Per previous RD note, patient's  was inquiring about patient's Vitamin B 12 status; updated level obtained on 24-Vitamin B12, Serum: 524 pg/mL  - Endo: ordered for SSI for coverage, ordered for   -Renal: Hypophosphatemia; ordered for PHOS-NaK for repletion  -GI:  Last BM 24   Bowel Regimen? [X] Yes- Senna and Miralax    [] No    Weights: Drug Dosing Weight  Weight (kg): 36.3 (2024 16:10)  BMI (kg/m2): 14.2 (2024 16:10)  Daily Weight in k (), Weight in k.7 (), Weight in k.4 (), Weight in k.4 ()    Nutritionally Pertinent MEDICATIONS  (STANDING):  ascorbic acid 500 milliGRAM(s) Oral daily  ceFAZolin   IVPB 2000 milliGRAM(s) IV Intermittent every 8 hours  ceFAZolin   IVPB      droxidopa 300 milliGRAM(s) Oral every 8 hours  folic acid 1 milliGRAM(s) Oral daily  guaiFENesin Oral Liquid (Sugar-Free) 100 milliGRAM(s) Oral every 6 hours  insulin lispro (ADMELOG) corrective regimen sliding scale   SubCutaneous every 6 hours  levothyroxine Injectable 80 MICROGram(s) IV Push at bedtime  metroNIDAZOLE  IVPB 500 milliGRAM(s) IV Intermittent every 12 hours  midodrine 10 milliGRAM(s) Oral every 8 hours  multivitamin 1 Tablet(s) Oral daily  polyethylene glycol 3350 17 Gram(s) Oral daily  potassium phosphate / sodium phosphate Powder (PHOS-NaK) 1 Packet(s) Oral three times a day  senna 2 Tablet(s) Oral at bedtime  sertraline 25 milliGRAM(s) Oral daily  sodium chloride 3%  Inhalation 4 milliLiter(s) Inhalation every 6 hours  tamoxifen 20 milliGRAM(s) Oral daily  thiamine 100 milliGRAM(s) Oral daily    MEDICATIONS  (PRN):    Pertinent Labs:  @ 06:14: Na 141, BUN 17, Cr <0.30<L>, <H>, K+ 4.6, Phos 2.4<L>, Mg 2.4, Alk Phos 88, ALT/SGPT 12, AST/SGOT 19, HbA1c --    A1C with Estimated Average Glucose Result: 5.8 % (24 @ 06:48)  A1C with Estimated Average Glucose Result: 5.7 % (23 @ 10:33)    Finger Sticks:  POCT Blood Glucose.: 105 mg/dL ( @ 13:31)  POCT Blood Glucose.: 116 mg/dL ( @ 05:49)  POCT Blood Glucose.: 120 mg/dL ( @ 00:46)  POCT Blood Glucose.: 128 mg/dL ( @ 17:01)      Skin per nursing documentation per nursing wound care note 24:  Sacral/ Buttocks /rt  hip stage 4 pressure injury  left shoulder/ hip/spine unstageable pressure injury  Bilateral Ear DTI  Lt foot DTI    Edema: +1 edema to right and left ankle per flow sheets    Estimated Needs:   Nutritional needs based on wt of 36.3 kG with consideration for underweight, severe malnutrition  Estimated Energy Needs: (38-42 kcal/kg): 2593-3513 kcal  Estimated Protein Needs: (1.7-2.3 g/kg): 62-83 gm  Defer Fluids to team   [X] no change since previous assessment  [] recalculated:     Previous Nutrition Diagnosis:   1. Underweight, Chronic-Severe Malnutrition  2. Increased nutrient needs  Nutrition Diagnosis is: [X] ongoing  [] resolved [] not applicable     New Nutrition Diagnosis: [X] Not applicable    Nutrition Care Plan:  [X] In Progress- addressed with diet order, PO encouragement and nutritional supplements   [] Achieved  [] Not applicable    Nutrition Interventions:     Education Provided:       [] Yes:  [X] No:        Recommendations:      1. Continue Glucerna 1.5 at goal rate of 55 ml/hr x 18 hours to provide 990 ml total volume, 1485 kcal, 82 gm protein and 751 ml free water. EN feeds at goal would meet 40 kcal/kg and 2.2 g/kg protein based on wt of 36.3 kG. Defer fluid needs to medical team   2. Continue with micronutrient supplementation of multivitamin, thiamine, Vit C, folic acid as medically feasible to aid in wound healing  3. RD remains available to adjust EN formulary, volume/rate  4. Monitor GI tolerance, skin integrity, labs, weight, and bowel movement regularity    Monitoring and Evaluation:   RD remains available upon request and will follow up per protocol  Leslie Mondragon, MS,RDN,CDN AVAILABLE ON TEAMS

## 2024-02-13 NOTE — RAPID RESPONSE TEAM SUMMARY - NSSITUATIONBACKGROUNDRRT_GEN_ALL_CORE
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 Admitted for septic shock likely 2/2 to wound infection, complicated by bacteremia. Course is now complicated by AHRF requiring HFNC. RRT called for hypoxia.
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 Admitted for sepsis likely 2/2 to wound infection, complicated by bacteremia.     RRT called for hypotension. Per primary RN at bedside patient's BP during the day has been systolic 80s-90s. BP was 75/48 on arm with small cuff so RRT was called. Prior to RRT patient given 500cc normal saline and an additional 500cc running when rapid called. 
68 year old F with hx of TBI (nonverbal, bedbound/contracted at baseline) s/p PEG last admission due to recurrent hypernatremia and chronic malnutrition, CVA. hypothyroidism, L breast cancer (s/p mastectomy tissue expander still in place ) who was brought in due to increasing unresponsiveness, fever, tachycardia, and hypotension. Patient found to have sepsis CT performed with concern for OM of sacral ulcer ( can be compared to CT from 1/1).

## 2024-02-13 NOTE — PROGRESS NOTE ADULT - SUBJECTIVE AND OBJECTIVE BOX
CHIEF COMPLAINT:    Interval Events:    Repeat RRT, with intermittent hypoxemia. Saturation much improved when seen at bedside.    REVIEW OF SYSTEMS:  unable to obtain given mental status    OBJECTIVE:  ICU Vital Signs Last 24 Hrs  T(C): 36.3 (12 Feb 2024 16:44), Max: 36.9 (12 Feb 2024 01:17)  T(F): 97.4 (12 Feb 2024 16:44), Max: 98.4 (12 Feb 2024 01:17)  HR: 75 (12 Feb 2024 16:44) (67 - 88)  BP: 92/60 (12 Feb 2024 16:44) (92/60 - 113/73)  BP(mean): --  ABP: --  ABP(mean): --  RR: 18 (12 Feb 2024 16:44) (18 - 18)  SpO2: 99% (12 Feb 2024 16:44) (96% - 100%)    O2 Parameters below as of 12 Feb 2024 16:44  Patient On (Oxygen Delivery Method): nasal cannula, high ammy              02-11 @ 07:01  -  02-12 @ 07:00  --------------------------------------------------------  IN: 1940 mL / OUT: 750 mL / NET: 1190 mL      CAPILLARY BLOOD GLUCOSE      POCT Blood Glucose.: 128 mg/dL (12 Feb 2024 17:01)      PHYSICAL EXAM:  General: awake non interactive  HEENT: NC/AT, EOMI b/l, conjunctiva normal, MMM  Lymph Nodes: no cervical LAD  Neck: supple. full range of motion  Respiratory: no accessory muscle use  Cardiovascular: S1 S2 present, RRR, no m/r/g  Abdomen: soft, NT/ND, +BS  Extremities: no c/c/e  Skin: no rashes or lesions noted  Neurological: AAO0  Psychiatry: calm, cooperative      HOSPITAL MEDICATIONS:  MEDICATIONS  (STANDING):  albuterol/ipratropium for Nebulization 3 milliLiter(s) Nebulizer every 6 hours  artificial  tears Solution 1 Drop(s) Both EYES two times a day  ascorbic acid 500 milliGRAM(s) Oral daily  ceFAZolin   IVPB 2000 milliGRAM(s) IV Intermittent every 8 hours  ceFAZolin   IVPB      chlorhexidine 2% Cloths 1 Application(s) Topical <User Schedule>  Dakins Solution - 1/4 Strength 1 Application(s) Topical every 12 hours  droxidopa 300 milliGRAM(s) Oral every 8 hours  folic acid 1 milliGRAM(s) Oral daily  guaiFENesin Oral Liquid (Sugar-Free) 100 milliGRAM(s) Oral every 6 hours  heparin   Injectable 5000 Unit(s) SubCutaneous every 12 hours  insulin lispro (ADMELOG) corrective regimen sliding scale   SubCutaneous every 6 hours  levothyroxine Injectable 80 MICROGram(s) IV Push at bedtime  metroNIDAZOLE  IVPB 500 milliGRAM(s) IV Intermittent every 12 hours  midodrine 10 milliGRAM(s) Oral every 8 hours  multivitamin 1 Tablet(s) Oral daily  polyethylene glycol 3350 17 Gram(s) Oral daily  senna 2 Tablet(s) Oral at bedtime  sertraline 25 milliGRAM(s) Oral daily  sodium chloride 3%  Inhalation 4 milliLiter(s) Inhalation every 6 hours  tamoxifen 20 milliGRAM(s) Oral daily  thiamine 100 milliGRAM(s) Oral daily    MEDICATIONS  (PRN):      LABS:                        9.1    9.20  )-----------( 239      ( 12 Feb 2024 07:50 )             31.7     Hgb Trend: 9.1<--, 9.4<--, 9.1<--, 9.0<--, 8.4<--  02-12    141  |  106  |  14  ----------------------------<  101<H>  4.4   |  28  |  <0.30<L>    Ca    8.2<L>      12 Feb 2024 07:50  Phos  2.5     02-11  Mg     2.4     02-11      Creatinine Trend: <0.30<--, <0.30<--, <0.30<--, <0.30<--, <0.30<--, <0.30<--    Urinalysis Basic - ( 12 Feb 2024 07:50 )    Color: x / Appearance: x / SG: x / pH: x  Gluc: 101 mg/dL / Ketone: x  / Bili: x / Urobili: x   Blood: x / Protein: x / Nitrite: x   Leuk Esterase: x / RBC: x / WBC x   Sq Epi: x / Non Sq Epi: x / Bacteria: x      Arterial Blood Gas:  02-11 @ 02:18  7.51/37/64/30/94.5/6.2  ABG lactate: --        MICROBIOLOGY:       RADIOLOGY:  [ x ] Reviewed and interpreted by me    PULMONARY FUNCTION TESTS:    EKG:

## 2024-02-13 NOTE — CHART NOTE - NSCHARTNOTEFT_GEN_A_CORE
HPI:  Pt non-verbal at baseline. Hx obtained via chart review and .    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 Found to have leukocytosis, tachycardia and hypotension here as well. Difficult to assess, contracted and non-verbal.    Spangler cathter placed in ED, POA.    Of note, patient was recently discharged after hospitalization for UTI and hypernatremia complicated by AHRF 2/2 to aspiration pneumonia. She is s/p PEG placement on 1/11/24. (30 Jan 2024 23:53)     Patient is s/p RRT for HYPOXIA . See RRT note for full detail.  Post RRT, patient remains on unit. Pt likely with recurrent aspiration, TF and medications on hold.  Discussed with RN  Discussed with [facility].   Continue to monitor  Will endorse to primary team in AM.      Inessa Dunham PA-C  Department of Medicine  [employer]

## 2024-02-13 NOTE — PROGRESS NOTE ADULT - TIME BILLING
Time-based billing (NON-critical care).     The necessity of the time spent during the encounter on this date of service was due to:     - Ordering, reviewing, and interpreting labs, testing, and imaging.  - Independently obtaining a review of systems and performing a physical exam  - Reviewing prior hospitalization and where necessary, outpatient records.  - Counselling and educating patient  regarding interpretation of aforementioned items and plan of care.
chart and data review, clinical assessment, and coordination of care. This excludes any time spent on separate procedures or teaching.
chart and data review, clinical assessment, and coordination of care. This excludes any time spent on separate procedures or teaching.
- Ordering, reviewing, and interpreting labs, testing, and imaging  - Independently obtaining a review of systems and performing a physical exam  - Reviewing consultant documentation/recommendations in addition to discussing plan of care with consultants
- Ordering, reviewing, and interpreting labs, testing, and imaging  - Independently obtaining a review of systems and performing a physical exam  - Reviewing consultant documentation/recommendations in addition to discussing plan of care with consultants  - Counselling and educating patient and family regarding interpretation of aforementioned items and plan of care
Symptom assessment and management, supportive counseling, coordination of care

## 2024-02-13 NOTE — PROGRESS NOTE ADULT - SUBJECTIVE AND OBJECTIVE BOX
Follow Up:  sepsis    Interval History/ROS: pt afebrile, but again had hypoxia and RRT was called, WBC: 11 today    ROS:    Unobtainable because: non verbal      Allergies  Tapazole (Hives)        ANTIMICROBIALS:  ceFAZolin   IVPB 2000 every 8 hours  ceFAZolin   IVPB    metroNIDAZOLE  IVPB 500 every 12 hours      OTHER MEDS:  albuterol/ipratropium for Nebulization 3 milliLiter(s) Nebulizer every 6 hours  artificial  tears Solution 1 Drop(s) Both EYES two times a day  ascorbic acid 500 milliGRAM(s) Oral daily  chlorhexidine 2% Cloths 1 Application(s) Topical <User Schedule>  Dakins Solution - 1/4 Strength 1 Application(s) Topical every 12 hours  droxidopa 300 milliGRAM(s) Oral every 8 hours  folic acid 1 milliGRAM(s) Oral daily  guaiFENesin Oral Liquid (Sugar-Free) 100 milliGRAM(s) Oral every 6 hours  heparin   Injectable 5000 Unit(s) SubCutaneous every 12 hours  insulin lispro (ADMELOG) corrective regimen sliding scale   SubCutaneous every 6 hours  levothyroxine Injectable 80 MICROGram(s) IV Push at bedtime  midodrine 10 milliGRAM(s) Oral every 8 hours  multivitamin 1 Tablet(s) Oral daily  polyethylene glycol 3350 17 Gram(s) Oral daily  potassium phosphate / sodium phosphate Powder (PHOS-NaK) 1 Packet(s) Oral three times a day  senna 2 Tablet(s) Oral at bedtime  sertraline 25 milliGRAM(s) Oral daily  sodium chloride 3%  Inhalation 4 milliLiter(s) Inhalation every 6 hours  tamoxifen 20 milliGRAM(s) Oral daily  thiamine 100 milliGRAM(s) Oral daily      Vital Signs Last 24 Hrs  T(C): 36.6 (13 Feb 2024 04:45), Max: 37.1 (13 Feb 2024 01:24)  T(F): 97.8 (13 Feb 2024 04:45), Max: 98.7 (13 Feb 2024 01:24)  HR: 93 (13 Feb 2024 07:14) (69 - 93)  BP: 107/74 (13 Feb 2024 07:14) (86/56 - 107/74)  BP(mean): --  RR: 20 (13 Feb 2024 07:14) (16 - 20)  SpO2: 99% (13 Feb 2024 07:14) (95% - 100%)    Parameters below as of 13 Feb 2024 07:14  Patient On (Oxygen Delivery Method): nasal cannula, high flow        Physical Exam:  General:    cachectic  Respiratory:   on highflow  abd:   soft, PEG with brownish drainage around it, not tender  :   no avila  Musculoskeletal : contracted, no joint swelling, no edema  Skin: multiple wounds including sacral with necrotic edge, back with linear eschar  vascular: no phlebitis                          10.6   11.27 )-----------( 250      ( 13 Feb 2024 06:14 )             36.0       02-13    141  |  104  |  17  ----------------------------<  124<H>  4.6   |  26  |  <0.30<L>    Ca    8.6      13 Feb 2024 06:14  Phos  2.4     02-13  Mg     2.4     02-13    TPro  6.7  /  Alb  3.4  /  TBili  0.5  /  DBili  x   /  AST  19  /  ALT  12  /  AlkPhos  88  02-13      Urinalysis Basic - ( 13 Feb 2024 06:14 )    Color: x / Appearance: x / SG: x / pH: x  Gluc: 124 mg/dL / Ketone: x  / Bili: x / Urobili: x   Blood: x / Protein: x / Nitrite: x   Leuk Esterase: x / RBC: x / WBC x   Sq Epi: x / Non Sq Epi: x / Bacteria: x        MICROBIOLOGY:  v  .Genital Vaginal  02-03-24   Normal genital forrest isolated  --  --      .Blood Blood-Peripheral  02-02-24   No growth at 5 days  --  --      .Blood Blood-Peripheral  02-02-24   No growth at 5 days  --  --      .Blood Blood-Peripheral  02-02-24   No growth at 5 days  --  --      .Blood Blood-Peripheral  02-01-24   No growth at 5 days  --  --      Clean Catch Clean Catch (Midstream)  01-30-24   <10,000 CFU/mL Normal Urogenital Forrest  --  --      .Blood Blood-Peripheral  01-30-24   Growth in aerobic and anaerobic bottles: Staphylococcus aureus  Direct identification is available within approximately 3-5  hours either by Blood Panel Multiplexed PCR or Direct  MALDI-TOF. Details: [email or web address]  --  Blood Culture PCR  Staphylococcus aureus      .Blood Blood-Peripheral  01-30-24   No growth at 5 days  --  --      Catheterized Catheterized  01-18-24   <10,000 CFU/mL Normal Urogenital Forrest  --  --                RADIOLOGY:  Images independently visualized and reviewed personally, findings as below  < from: Xray Chest 1 View- PORTABLE-Urgent (Xray Chest 1 View- PORTABLE-Urgent .) (02.13.24 @ 06:55) >    INTERPRETATION:  Right peripheral line tip overlies the mid right lung   field, possibly in the brachiocephalic vein.  Right pleural effusion is decreased from prior. Unchanged small left   pleural effusion.    < end of copied text >  < from: MR Lumbar Spine w/ IV Cont (02.01.24 @ 08:56) >    IMPRESSION:  Prominent sacral decubitus ulcer extending to the coccygeal tip without   evidence of underlying osteomyelitis or discrete fluid collections in the   surrounding soft tissues.    Sinus tract is seen extending from the skin surface to the L3 spinous   process which may represent additional ulcer. Recommend correlation with   clinical aspects of the case. No evidence of osteomyelitis in the   underlying L3 spinous process.    Multilevel chronic compression deformities and lumbar spondylosis as   described.    < end of copied text >  < from: TTE Limited W or WO Ultrasound Enhancing Agent (02.03.24 @ 13:20) >  CONCLUSIONS:      1. Non-diagnostic image quality.   2. Unable to evaluate left ventricular ejection fraction.      < end of copied text >

## 2024-02-13 NOTE — PROGRESS NOTE ADULT - ATTENDING COMMENTS
1. Acute hypoxemic respiratory failure due to repeated aspiration pneumonia. Pt h/o cva.     High Flow decreased to 40% 02. If tolerated , can trial 6 liters nasal canula. Back on Hi Flow 02  after likely repeat aspiration.     Continue cefazolin and flagyl     Airway clearance devices as tolerated. Not sure she can do to much with these.    2. MSSA bacteremia. Finishing course of Cefazolin.    3. Continue PEG tube feeds.    Case discussed with pt's .

## 2024-02-13 NOTE — PROGRESS NOTE ADULT - SUBJECTIVE AND OBJECTIVE BOX
PROGRESS NOTE:   Authored by Dr. Ciera Phillips MD, Available on MS Teams    Patient is a 68y old  Female who presents with a chief complaint of Sepsis 2/2 osteomyelitis (13 Feb 2024 09:46)      SUBJECTIVE / OVERNIGHT EVENTS: Patient had RRT this am for hypoxia, continues to be on HFNC    ADDITIONAL REVIEW OF SYSTEMS:    MEDICATIONS  (STANDING):  albuterol/ipratropium for Nebulization 3 milliLiter(s) Nebulizer every 6 hours  artificial  tears Solution 1 Drop(s) Both EYES two times a day  ascorbic acid 500 milliGRAM(s) Oral daily  ceFAZolin   IVPB 2000 milliGRAM(s) IV Intermittent every 8 hours  ceFAZolin   IVPB      chlorhexidine 2% Cloths 1 Application(s) Topical <User Schedule>  Dakins Solution - 1/4 Strength 1 Application(s) Topical every 12 hours  droxidopa 300 milliGRAM(s) Oral every 8 hours  folic acid 1 milliGRAM(s) Oral daily  guaiFENesin Oral Liquid (Sugar-Free) 100 milliGRAM(s) Oral every 6 hours  heparin   Injectable 5000 Unit(s) SubCutaneous every 12 hours  insulin lispro (ADMELOG) corrective regimen sliding scale   SubCutaneous every 6 hours  levothyroxine Injectable 80 MICROGram(s) IV Push at bedtime  metroNIDAZOLE  IVPB 500 milliGRAM(s) IV Intermittent every 12 hours  midodrine 10 milliGRAM(s) Oral every 8 hours  multivitamin 1 Tablet(s) Oral daily  polyethylene glycol 3350 17 Gram(s) Oral daily  potassium phosphate / sodium phosphate Powder (PHOS-NaK) 1 Packet(s) Oral three times a day  senna 2 Tablet(s) Oral at bedtime  sertraline 25 milliGRAM(s) Oral daily  sodium chloride 3%  Inhalation 4 milliLiter(s) Inhalation every 6 hours  tamoxifen 20 milliGRAM(s) Oral daily  thiamine 100 milliGRAM(s) Oral daily    MEDICATIONS  (PRN):      CAPILLARY BLOOD GLUCOSE      POCT Blood Glucose.: 105 mg/dL (13 Feb 2024 13:31)  POCT Blood Glucose.: 116 mg/dL (13 Feb 2024 05:49)  POCT Blood Glucose.: 120 mg/dL (13 Feb 2024 00:46)  POCT Blood Glucose.: 128 mg/dL (12 Feb 2024 17:01)    I&O's Summary    12 Feb 2024 07:01  -  13 Feb 2024 07:00  --------------------------------------------------------  IN: 1945 mL / OUT: 450 mL / NET: 1495 mL    13 Feb 2024 07:01  -  13 Feb 2024 16:04  --------------------------------------------------------  IN: 0 mL / OUT: 400 mL / NET: -400 mL        PHYSICAL EXAM:  Vital Signs Last 24 Hrs  T(C): 36.3 (13 Feb 2024 14:50), Max: 37.1 (13 Feb 2024 01:24)  T(F): 97.3 (13 Feb 2024 14:50), Max: 98.7 (13 Feb 2024 01:24)  HR: 84 (13 Feb 2024 14:50) (69 - 93)  BP: 108/74 (13 Feb 2024 14:50) (86/56 - 108/74)  BP(mean): --  RR: 18 (13 Feb 2024 15:11) (16 - 20)  SpO2: 98% (13 Feb 2024 15:11) (95% - 100%)    Parameters below as of 13 Feb 2024 15:11  Patient On (Oxygen Delivery Method): nasal cannula, high flow  O2 Flow (L/min): 60  O2 Concentration (%): 70    CONSTITUTIONAL: ill appearing, cachectic   RESPIRATORY: Normal respiratory effort; no wheezing  CARDIOVASCULAR: Regular rate and rhythm, normal S1 and S2, no murmur/rub/gallop; No lower extremity edema  ABDOMEN: Nontender to palpation, normoactive bowel sounds, no rebound/guarding  MUSCLOSKELETAL: no clubbing or cyanosis of digits; contracted extremities   PSYCH: A+Ox0    LABS:                        10.6   11.27 )-----------( 250      ( 13 Feb 2024 06:14 )             36.0     02-13    141  |  104  |  17  ----------------------------<  124<H>  4.6   |  26  |  <0.30<L>    Ca    8.6      13 Feb 2024 06:14  Phos  2.4     02-13  Mg     2.4     02-13    TPro  6.7  /  Alb  3.4  /  TBili  0.5  /  DBili  x   /  AST  19  /  ALT  12  /  AlkPhos  88  02-13    PT/INR - ( 13 Feb 2024 06:14 )   PT: 12.4 sec;   INR: 1.19 ratio         PTT - ( 13 Feb 2024 06:14 )  PTT:27.4 sec      Urinalysis Basic - ( 13 Feb 2024 06:14 )    Color: x / Appearance: x / SG: x / pH: x  Gluc: 124 mg/dL / Ketone: x  / Bili: x / Urobili: x   Blood: x / Protein: x / Nitrite: x   Leuk Esterase: x / RBC: x / WBC x   Sq Epi: x / Non Sq Epi: x / Bacteria: x

## 2024-02-13 NOTE — PROGRESS NOTE ADULT - ASSESSMENT
68 f with  TBI, SAH, non-verbal at baseline, CVA, sacral ulcer, graves, L breast ca s/p mastectomy on tamoxifen, aspiration s/p PEG, multiple decubiti and sacral osteo, sent from NH for  multiple days of less responsiveness and fever, here febrile to 103.4, tachycardic, hypotensive to 70s presents from [facility] due to multiple days of reduced responsiveness and fever. Found to have leukocytosis, tachycardia and hypotension here as well. Difficult to assess, contracted and non-verbal.  has multiple wound with necrotic edge  WBC: 14, Na: 151, u/a with 17 WBC  CT: no PE, sacral decub to the level of sacrum and ?osteo      fever, tachycardia, hypotension, leukocytosis, transaminitis, hypernatremia septic shock, due to MSSA bacteremia, ? wound related   has multiple wound which have slough but no jim purulent drainage and the MRI did not show osteo or abscess, just a sinus tract extending from L3  first blood cx had only 1/4 MSSA and repeat blood cx negative but pt deteriorated, TTE was not able to evaluate any valves due to contractures  s/p cefepime 2 q 8 and flagyl 500 q 12 on 2/5-2/12, now back on cefazolin MSSA bacteremia,   now with hypoxia, ?aspiration but CXR did not show any consolidations  * will complete a 4 week course of antibiotics post negative blood cx through 2/29  * if worsening status or signs of new infections switch to zosyn  * monitor CBC/diff and CMP  * continue wound care  * aspiration precautions    The above assessment and plan was discussed with the primary team    Katherin Teixeira MD  contact on teams  After 5pm and on weekends call [phone]

## 2024-02-13 NOTE — RAPID RESPONSE TEAM SUMMARY - NSADDTLFINDINGSRRT_GEN_ALL_CORE
RRT called for hypotension. Prior to arrival patient with BP 40s/20s, but on repeat BP systolic 80s-90s with MAP >65. Patient at baseline mental status, nonverbal and bedbound at baseline. Patient given additional fluid bolus given no evidence of contraindications and no signs of fluid overload on exam. Primary team to reconsult MICU if BP continues to drop despite additional fluid bolus. RRT ended. 
[< 3 months] : less than 3 months
Patient desaturated to 60s on HFNC and RRT was called. She was placed on NRB as well. Not a candidate for BiPAP due to chronic mental status changes. Other vitals include HR in 70s, BP fluctuated with SBPs in 60s and 70s however this was with large cuff. When appropriate cuff was used, BP roughly 100s/60s. Patient likely with recurrent aspirations per pulmonology who is following the patient. Low concern for acute PE given normal HR and patient on DVT prophylaxis. Patient turned onto her R side and deep suctioned. Oxygenation improved to 100%. NRB removed and patient remained saturating 100% on HFNC. RRT ended. Of note family was called during the rapid by the primary team due to concern for possible intubation and she remains full code. Recommend continued aggressive airway clearance and suctioning. 
[FreeTextEntry1] : evaluation of spinal compression fracture
On arrival patient laying in bed. According to primary RN at baseline patient tracks and follows simple commands like squeezing hands. Patient still able to track and follow commands but according to RN is somewhat more lethargic. Patient with MAP 50s. Remainder of 500cc bolus given through pressure bag and another 1L bolus subsequently started. Patient given midodrine 10mg around 1pm, an additional 20mg of midodrine given through PEG tube. Patient also on hydrocortisone 50q12, next dose given early during RRT. Despite these interventions patient remained with MAP in 50s. Patient's  called and confirmed that patient is full code and would want pressors if needed. Levo started and uptitrated to maintain MAP >65. MICU called and accepted patient. Patient safely transferred to ICU. RRT ended. 
[de-identified] : Angelika James is a 80 year old woman who current resides in [facility] who presents  with a PMHx of multiple myeloma, complex T10 fracture, HTN, RA, remote CVA, DM  who presents for evaluation of her Spine. She was initially hospitalized at [facility] after experiencing altered mental status was diagnosed with left frontal hemorrhage and SAH and Thoracic Fracture. She was discharged 4/19/22 to rehab for conservative management and restorative care.\par She comes via wheelchair with attendant and her grandson is present as well. She reports that she is making progress taylor the rehab but no definite discharge date has been set because she still needs assistance with ambulation. She was able to walk with walker one month prior to her hospitalization. She reports moderate back pain.\par She  has a home attendant 9am-2pm. She has urinary incontinence but no bowel difficulty.\par \par \par Hospital Course: \par Discharge Date	19-Apr-2022 \par Admission Date	13-Apr-2022 03:39 \par Reason for Admission	AMS x1 day \par Hospital Course	 \par  80 F w / pmh x significant for HTN, multiple myeloma, RA on ASA81, remote h/o \par CVA no residual , p/w AMS to [facility] , found to have SAH t/f to [facility]. Also with \par t-spine fracture on MRI, pursuing conservative management with TLSO and plan to \par re-assess outpt. \par \par Fracture of thoracic spine; unstable 3 column horizontal T10 spinal fracture \par ns seen and followed  - plan for conservative orthotics management for now, \par potential surgery in future outpt follow up; multimodal pain control. \par

## 2024-02-13 NOTE — PROVIDER CONTACT NOTE (OTHER) - ASSESSMENT
While dressing change being done, patient was turned to side and began desaturating. Initially down to 77% and NRB placed over HFNC. Respiratory therapist was called and at bedside. Patient was repositioned and continued to desat as low as 60% with NRB. RRT called. BP 79/46, HR 88.

## 2024-02-14 DIAGNOSIS — R33.9 RETENTION OF URINE, UNSPECIFIED: ICD-10-CM

## 2024-02-14 LAB
ANION GAP SERPL CALC-SCNC: 11 MMOL/L — SIGNIFICANT CHANGE UP (ref 5–17)
BASOPHILS # BLD AUTO: 0.03 K/UL — SIGNIFICANT CHANGE UP (ref 0–0.2)
BASOPHILS NFR BLD AUTO: 0.3 % — SIGNIFICANT CHANGE UP (ref 0–2)
BUN SERPL-MCNC: 19 MG/DL — SIGNIFICANT CHANGE UP (ref 7–23)
CALCIUM SERPL-MCNC: 8.6 MG/DL — SIGNIFICANT CHANGE UP (ref 8.4–10.5)
CHLORIDE SERPL-SCNC: 105 MMOL/L — SIGNIFICANT CHANGE UP (ref 96–108)
CO2 SERPL-SCNC: 26 MMOL/L — SIGNIFICANT CHANGE UP (ref 22–31)
CREAT SERPL-MCNC: <0.3 MG/DL — LOW (ref 0.5–1.3)
EGFR: 114 ML/MIN/1.73M2 — SIGNIFICANT CHANGE UP
EOSINOPHIL # BLD AUTO: 0.05 K/UL — SIGNIFICANT CHANGE UP (ref 0–0.5)
EOSINOPHIL NFR BLD AUTO: 0.5 % — SIGNIFICANT CHANGE UP (ref 0–6)
GLUCOSE BLDC GLUCOMTR-MCNC: 122 MG/DL — HIGH (ref 70–99)
GLUCOSE BLDC GLUCOMTR-MCNC: 122 MG/DL — HIGH (ref 70–99)
GLUCOSE BLDC GLUCOMTR-MCNC: 125 MG/DL — HIGH (ref 70–99)
GLUCOSE BLDC GLUCOMTR-MCNC: 187 MG/DL — HIGH (ref 70–99)
GLUCOSE SERPL-MCNC: 113 MG/DL — HIGH (ref 70–99)
HCT VFR BLD CALC: 28.9 % — LOW (ref 34.5–45)
HGB BLD-MCNC: 8.6 G/DL — LOW (ref 11.5–15.5)
IMM GRANULOCYTES NFR BLD AUTO: 0.6 % — SIGNIFICANT CHANGE UP (ref 0–0.9)
LACTATE SERPL-SCNC: 1.4 MMOL/L — SIGNIFICANT CHANGE UP (ref 0.5–2)
LYMPHOCYTES # BLD AUTO: 0.97 K/UL — LOW (ref 1–3.3)
LYMPHOCYTES # BLD AUTO: 10.4 % — LOW (ref 13–44)
MAGNESIUM SERPL-MCNC: 2.3 MG/DL — SIGNIFICANT CHANGE UP (ref 1.6–2.6)
MCHC RBC-ENTMCNC: 27.9 PG — SIGNIFICANT CHANGE UP (ref 27–34)
MCHC RBC-ENTMCNC: 29.8 GM/DL — LOW (ref 32–36)
MCV RBC AUTO: 93.8 FL — SIGNIFICANT CHANGE UP (ref 80–100)
MONOCYTES # BLD AUTO: 0.44 K/UL — SIGNIFICANT CHANGE UP (ref 0–0.9)
MONOCYTES NFR BLD AUTO: 4.7 % — SIGNIFICANT CHANGE UP (ref 2–14)
NEUTROPHILS # BLD AUTO: 7.75 K/UL — HIGH (ref 1.8–7.4)
NEUTROPHILS NFR BLD AUTO: 83.5 % — HIGH (ref 43–77)
NRBC # BLD: 0 /100 WBCS — SIGNIFICANT CHANGE UP (ref 0–0)
PHOSPHATE SERPL-MCNC: 2.8 MG/DL — SIGNIFICANT CHANGE UP (ref 2.5–4.5)
PLATELET # BLD AUTO: 211 K/UL — SIGNIFICANT CHANGE UP (ref 150–400)
POTASSIUM SERPL-MCNC: 4.3 MMOL/L — SIGNIFICANT CHANGE UP (ref 3.5–5.3)
POTASSIUM SERPL-SCNC: 4.3 MMOL/L — SIGNIFICANT CHANGE UP (ref 3.5–5.3)
RBC # BLD: 3.08 M/UL — LOW (ref 3.8–5.2)
RBC # FLD: 18.7 % — HIGH (ref 10.3–14.5)
SODIUM SERPL-SCNC: 142 MMOL/L — SIGNIFICANT CHANGE UP (ref 135–145)
WBC # BLD: 9.3 K/UL — SIGNIFICANT CHANGE UP (ref 3.8–10.5)
WBC # FLD AUTO: 9.3 K/UL — SIGNIFICANT CHANGE UP (ref 3.8–10.5)

## 2024-02-14 PROCEDURE — 99232 SBSQ HOSP IP/OBS MODERATE 35: CPT | Mod: GC

## 2024-02-14 PROCEDURE — 99232 SBSQ HOSP IP/OBS MODERATE 35: CPT

## 2024-02-14 RX ADMIN — SODIUM CHLORIDE 4 MILLILITER(S): 9 INJECTION INTRAMUSCULAR; INTRAVENOUS; SUBCUTANEOUS at 05:46

## 2024-02-14 RX ADMIN — HEPARIN SODIUM 5000 UNIT(S): 5000 INJECTION INTRAVENOUS; SUBCUTANEOUS at 17:54

## 2024-02-14 RX ADMIN — SODIUM CHLORIDE 4 MILLILITER(S): 9 INJECTION INTRAMUSCULAR; INTRAVENOUS; SUBCUTANEOUS at 23:21

## 2024-02-14 RX ADMIN — DROXIDOPA 300 MILLIGRAM(S): 100 CAPSULE ORAL at 23:19

## 2024-02-14 RX ADMIN — Medication 100 MILLIGRAM(S): at 05:46

## 2024-02-14 RX ADMIN — MIDODRINE HYDROCHLORIDE 10 MILLIGRAM(S): 2.5 TABLET ORAL at 23:20

## 2024-02-14 RX ADMIN — Medication 100 MILLIGRAM(S): at 12:21

## 2024-02-14 RX ADMIN — Medication 1 DROP(S): at 23:20

## 2024-02-14 RX ADMIN — MIDODRINE HYDROCHLORIDE 10 MILLIGRAM(S): 2.5 TABLET ORAL at 14:50

## 2024-02-14 RX ADMIN — POLYETHYLENE GLYCOL 3350 17 GRAM(S): 17 POWDER, FOR SOLUTION ORAL at 12:22

## 2024-02-14 RX ADMIN — Medication 1 PACKET(S): at 05:45

## 2024-02-14 RX ADMIN — HEPARIN SODIUM 5000 UNIT(S): 5000 INJECTION INTRAVENOUS; SUBCUTANEOUS at 05:46

## 2024-02-14 RX ADMIN — Medication 100 MILLIGRAM(S): at 23:19

## 2024-02-14 RX ADMIN — Medication 80 MICROGRAM(S): at 23:20

## 2024-02-14 RX ADMIN — Medication 100 MILLIGRAM(S): at 23:20

## 2024-02-14 RX ADMIN — SODIUM CHLORIDE 4 MILLILITER(S): 9 INJECTION INTRAMUSCULAR; INTRAVENOUS; SUBCUTANEOUS at 17:53

## 2024-02-14 RX ADMIN — Medication 3 MILLILITER(S): at 12:22

## 2024-02-14 RX ADMIN — Medication 100 MILLIGRAM(S): at 17:53

## 2024-02-14 RX ADMIN — Medication 100 MILLIGRAM(S): at 05:47

## 2024-02-14 RX ADMIN — Medication 3 MILLILITER(S): at 17:48

## 2024-02-14 RX ADMIN — Medication 1 DROP(S): at 00:56

## 2024-02-14 RX ADMIN — SODIUM CHLORIDE 4 MILLILITER(S): 9 INJECTION INTRAMUSCULAR; INTRAVENOUS; SUBCUTANEOUS at 01:06

## 2024-02-14 RX ADMIN — Medication 1 DROP(S): at 10:55

## 2024-02-14 RX ADMIN — SENNA PLUS 2 TABLET(S): 8.6 TABLET ORAL at 23:20

## 2024-02-14 RX ADMIN — Medication 100 MILLIGRAM(S): at 02:37

## 2024-02-14 RX ADMIN — Medication 1 MILLIGRAM(S): at 12:21

## 2024-02-14 RX ADMIN — Medication 100 MILLIGRAM(S): at 14:50

## 2024-02-14 RX ADMIN — Medication 1 APPLICATION(S): at 05:47

## 2024-02-14 RX ADMIN — Medication 1 APPLICATION(S): at 17:54

## 2024-02-14 RX ADMIN — Medication 500 MILLIGRAM(S): at 12:21

## 2024-02-14 RX ADMIN — SODIUM CHLORIDE 4 MILLILITER(S): 9 INJECTION INTRAMUSCULAR; INTRAVENOUS; SUBCUTANEOUS at 12:22

## 2024-02-14 RX ADMIN — Medication 1: at 12:14

## 2024-02-14 RX ADMIN — TAMOXIFEN CITRATE 20 MILLIGRAM(S): 20 TABLET, FILM COATED ORAL at 12:21

## 2024-02-14 RX ADMIN — Medication 100 MILLIGRAM(S): at 14:53

## 2024-02-14 RX ADMIN — Medication 100 MILLIGRAM(S): at 10:51

## 2024-02-14 RX ADMIN — Medication 1 TABLET(S): at 12:22

## 2024-02-14 RX ADMIN — DROXIDOPA 300 MILLIGRAM(S): 100 CAPSULE ORAL at 14:50

## 2024-02-14 RX ADMIN — DROXIDOPA 300 MILLIGRAM(S): 100 CAPSULE ORAL at 05:45

## 2024-02-14 RX ADMIN — SERTRALINE 25 MILLIGRAM(S): 25 TABLET, FILM COATED ORAL at 12:21

## 2024-02-14 RX ADMIN — Medication 3 MILLILITER(S): at 05:46

## 2024-02-14 RX ADMIN — CHLORHEXIDINE GLUCONATE 1 APPLICATION(S): 213 SOLUTION TOPICAL at 05:45

## 2024-02-14 RX ADMIN — Medication 100 MILLIGRAM(S): at 17:54

## 2024-02-14 RX ADMIN — MIDODRINE HYDROCHLORIDE 10 MILLIGRAM(S): 2.5 TABLET ORAL at 05:45

## 2024-02-14 RX ADMIN — Medication 3 MILLILITER(S): at 23:19

## 2024-02-14 RX ADMIN — Medication 3 MILLILITER(S): at 00:56

## 2024-02-14 NOTE — CHART NOTE - NSCHARTNOTEFT_GEN_A_CORE
Trial off HFNC, transitioned to 6L NC, pt sustained 85 % FiO2 at rest on 6 LPM NC and was transitioned back to HFNC   -- not in acute distress  -- continue airway clearance with chest PT, acapella, 3 % INH and duonebs, also Robitussin ordered

## 2024-02-14 NOTE — PROGRESS NOTE ADULT - ASSESSMENT
68 f with  TBI, SAH, non-verbal at baseline, CVA, sacral ulcer, graves, L breast ca s/p mastectomy on tamoxifen, aspiration s/p PEG, multiple decubiti and sacral osteo, sent from NH for  multiple days of less responsiveness and fever, here febrile to 103.4, tachycardic, hypotensive to 70s presents from [facility] due to multiple days of reduced responsiveness and fever. Found to have leukocytosis, tachycardia and hypotension here as well. Difficult to assess, contracted and non-verbal.  has multiple wound with necrotic edge  WBC: 14, Na: 151, u/a with 17 WBC  CT: no PE, sacral decub to the level of sacrum and ?osteo      fever, tachycardia, hypotension, leukocytosis, transaminitis, hypernatremia septic shock, due to MSSA bacteremia, ? wound related   has multiple wound which have slough but no jim purulent drainage and the MRI did not show osteo or abscess, just a sinus tract extending from L3  first blood cx had only 1/4 MSSA and repeat blood cx negative but pt deteriorated, TTE was not able to evaluate any valves due to contractures  s/p cefepime 2 q 8 and flagyl 500 q 12 on 2/5-2/12, now back on cefazolin MSSA bacteremia,   now with hypoxia, ?aspiration but CXR did not show any consolidations  * will complete a 4 week course of antibiotics post negative blood cx through 2/29  * if worsening status or signs of new infections switch to zosyn  * monitor CBC/diff and CMP  * continue wound care  * aspiration precautions    The above assessment and plan was discussed with the primary team    Katherin Teixeira MD  contact on teams  After 5pm and on weekends call [phone]

## 2024-02-14 NOTE — PROGRESS NOTE ADULT - SUBJECTIVE AND OBJECTIVE BOX
Follow Up:  sepsis    Interval History/ROS: pt afebrile and WBC normal but still on High flow    ROS:    Unobtainable because: non verbal          Allergies  Tapazole (Hives)        ANTIMICROBIALS:  ceFAZolin   IVPB 2000 every 8 hours  ceFAZolin   IVPB    metroNIDAZOLE  IVPB 500 every 12 hours      OTHER MEDS:  albuterol/ipratropium for Nebulization 3 milliLiter(s) Nebulizer every 6 hours  artificial  tears Solution 1 Drop(s) Both EYES two times a day  ascorbic acid 500 milliGRAM(s) Oral daily  chlorhexidine 2% Cloths 1 Application(s) Topical <User Schedule>  Dakins Solution - 1/4 Strength 1 Application(s) Topical every 12 hours  droxidopa 300 milliGRAM(s) Oral every 8 hours  folic acid 1 milliGRAM(s) Oral daily  guaiFENesin Oral Liquid (Sugar-Free) 100 milliGRAM(s) Oral every 4 hours  heparin   Injectable 5000 Unit(s) SubCutaneous every 12 hours  insulin lispro (ADMELOG) corrective regimen sliding scale   SubCutaneous every 6 hours  levothyroxine Injectable 80 MICROGram(s) IV Push at bedtime  midodrine 10 milliGRAM(s) Oral every 8 hours  multivitamin 1 Tablet(s) Oral daily  polyethylene glycol 3350 17 Gram(s) Oral daily  senna 2 Tablet(s) Oral at bedtime  sertraline 25 milliGRAM(s) Oral daily  sodium chloride 3%  Inhalation 4 milliLiter(s) Inhalation every 6 hours  tamoxifen 20 milliGRAM(s) Oral daily  thiamine 100 milliGRAM(s) Oral daily      Vital Signs Last 24 Hrs  T(C): 36.9 (14 Feb 2024 09:06), Max: 37.2 (14 Feb 2024 05:18)  T(F): 98.4 (14 Feb 2024 09:06), Max: 98.9 (14 Feb 2024 05:18)  HR: 87 (14 Feb 2024 09:06) (63 - 89)  BP: 101/68 (14 Feb 2024 09:06) (101/68 - 127/85)  BP(mean): --  RR: 20 (14 Feb 2024 09:06) (18 - 20)  SpO2: 91% (14 Feb 2024 09:06) (91% - 100%)    Parameters below as of 14 Feb 2024 09:06  Patient On (Oxygen Delivery Method): nasal cannula, high flow  O2 Flow (L/min): 35  O2 Concentration (%): 50    Physical Exam:  General:    cachectic  Respiratory:   on highflow  abd:   soft, PEG with brownish drainage around it, not tender  :   no avila  Musculoskeletal : contracted, no joint swelling, no edema  Skin: multiple wounds including sacral with necrotic edge, back with linear eschar  vascular: no phlebitis                        8.6    9.30  )-----------( 211      ( 14 Feb 2024 07:47 )             28.9       02-14    142  |  105  |  19  ----------------------------<  113<H>  4.3   |  26  |  <0.30<L>    Ca    8.6      14 Feb 2024 07:47  Phos  2.8     02-14  Mg     2.3     02-14    TPro  6.7  /  Alb  3.4  /  TBili  0.5  /  DBili  x   /  AST  19  /  ALT  12  /  AlkPhos  88  02-13      Urinalysis Basic - ( 14 Feb 2024 07:47 )    Color: x / Appearance: x / SG: x / pH: x  Gluc: 113 mg/dL / Ketone: x  / Bili: x / Urobili: x   Blood: x / Protein: x / Nitrite: x   Leuk Esterase: x / RBC: x / WBC x   Sq Epi: x / Non Sq Epi: x / Bacteria: x        MICROBIOLOGY:  v  .Genital Vaginal  02-03-24   Normal genital forrest isolated  --  --      .Blood Blood-Peripheral  02-02-24   No growth at 5 days  --  --      .Blood Blood-Peripheral  02-02-24   No growth at 5 days  --  --      .Blood Blood-Peripheral  02-02-24   No growth at 5 days  --  --      .Blood Blood-Peripheral  02-01-24   No growth at 5 days  --  --      Clean Catch Clean Catch (Midstream)  01-30-24   <10,000 CFU/mL Normal Urogenital Forrest  --  --      .Blood Blood-Peripheral  01-30-24   Growth in aerobic and anaerobic bottles: Staphylococcus aureus  Direct identification is available within approximately 3-5  hours either by Blood Panel Multiplexed PCR or Direct  MALDI-TOF. Details: [email or web address]  --  Blood Culture PCR  Staphylococcus aureus      .Blood Blood-Peripheral  01-30-24   No growth at 5 days  --  --      Catheterized Catheterized  01-18-24   <10,000 CFU/mL Normal Urogenital Forrest  --  --                RADIOLOGY:  Images independently visualized and reviewed personally, findings as below  < from: Xray Chest 1 View- PORTABLE-Urgent (Xray Chest 1 View- PORTABLE-Urgent .) (02.13.24 @ 06:55) >  IMPRESSION:  Interval decrease in right-sided pleural effusion.  Small left pleural effusion and atelectasis, not significantly changed.    < end of copied text >  < from: MR Lumbar Spine w/ IV Cont (02.01.24 @ 08:56) >  IMPRESSION:  Prominent sacral decubitus ulcer extending to the coccygeal tip without   evidence of underlying osteomyelitis or discrete fluid collections in the   surrounding soft tissues.    Sinus tract is seen extending from the skin surface to the L3 spinous   process which may represent additional ulcer. Recommend correlation with   clinical aspects of the case. No evidence of osteomyelitis in the   underlying L3 spinous process.    Multilevel chronic compression deformities and lumbar spondylosis as   described.    < end of copied text >  < from: TTE Limited W or WO Ultrasound Enhancing Agent (02.03.24 @ 13:20) >  CONCLUSIONS:      1. Non-diagnostic image quality.   2. Unable to evaluate left ventricular ejection fraction.      < end of copied text >

## 2024-02-14 NOTE — CHART NOTE - NSCHARTNOTESELECT_GEN_ALL_CORE
Event Note
Event Note
MAR Accept Note/Event Note
MICU accept/Event Note
MICU downgrade/Transfer Note
Nutrition Services
RRT/Event Note
Event Note
Nutrition Services
Nutrition Services
POCUS
POCUS/Event Note
Soft Blood Pressure/Event Note
hypoxemia/Event Note

## 2024-02-14 NOTE — PROGRESS NOTE ADULT - SUBJECTIVE AND OBJECTIVE BOX
PROGRESS NOTE:   Authored by Dr. Ciera Phillips MD, Available on MS Teams    Patient is a 68y old  Female who presents with a chief complaint of Sepsis 2/2 osteomyelitis (14 Feb 2024 12:05)      SUBJECTIVE / OVERNIGHT EVENTS:  Patient straight cath x1. Still on HFNC    ADDITIONAL REVIEW OF SYSTEMS:    MEDICATIONS  (STANDING):  albuterol/ipratropium for Nebulization 3 milliLiter(s) Nebulizer every 6 hours  artificial  tears Solution 1 Drop(s) Both EYES two times a day  ascorbic acid 500 milliGRAM(s) Oral daily  ceFAZolin   IVPB 2000 milliGRAM(s) IV Intermittent every 8 hours  ceFAZolin   IVPB      chlorhexidine 2% Cloths 1 Application(s) Topical <User Schedule>  Dakins Solution - 1/4 Strength 1 Application(s) Topical every 12 hours  droxidopa 300 milliGRAM(s) Oral every 8 hours  folic acid 1 milliGRAM(s) Oral daily  guaiFENesin Oral Liquid (Sugar-Free) 100 milliGRAM(s) Oral every 4 hours  heparin   Injectable 5000 Unit(s) SubCutaneous every 12 hours  insulin lispro (ADMELOG) corrective regimen sliding scale   SubCutaneous every 6 hours  levothyroxine Injectable 80 MICROGram(s) IV Push at bedtime  metroNIDAZOLE  IVPB 500 milliGRAM(s) IV Intermittent every 12 hours  midodrine 10 milliGRAM(s) Oral every 8 hours  multivitamin 1 Tablet(s) Oral daily  polyethylene glycol 3350 17 Gram(s) Oral daily  senna 2 Tablet(s) Oral at bedtime  sertraline 25 milliGRAM(s) Oral daily  sodium chloride 3%  Inhalation 4 milliLiter(s) Inhalation every 6 hours  tamoxifen 20 milliGRAM(s) Oral daily  thiamine 100 milliGRAM(s) Oral daily    MEDICATIONS  (PRN):      CAPILLARY BLOOD GLUCOSE      POCT Blood Glucose.: 187 mg/dL (14 Feb 2024 11:53)  POCT Blood Glucose.: 122 mg/dL (14 Feb 2024 06:29)  POCT Blood Glucose.: 125 mg/dL (14 Feb 2024 00:04)  POCT Blood Glucose.: 101 mg/dL (13 Feb 2024 17:25)    I&O's Summary    13 Feb 2024 07:01  -  14 Feb 2024 07:00  --------------------------------------------------------  IN: 1150 mL / OUT: 400 mL / NET: 750 mL    14 Feb 2024 07:01  -  14 Feb 2024 14:33  --------------------------------------------------------  IN: 0 mL / OUT: 400 mL / NET: -400 mL        PHYSICAL EXAM:  Vital Signs Last 24 Hrs  T(C): 37.1 (14 Feb 2024 13:43), Max: 37.2 (14 Feb 2024 05:18)  T(F): 98.7 (14 Feb 2024 13:43), Max: 98.9 (14 Feb 2024 05:18)  HR: 90 (14 Feb 2024 13:43) (63 - 90)  BP: 109/75 (14 Feb 2024 13:43) (101/68 - 127/85)  BP(mean): --  RR: 20 (14 Feb 2024 13:43) (18 - 20)  SpO2: 100% (14 Feb 2024 13:43) (91% - 100%)    Parameters below as of 14 Feb 2024 13:43  Patient On (Oxygen Delivery Method): nasal cannula, high flow  O2 Flow (L/min): 35  O2 Concentration (%): 55    CONSTITUTIONAL: ill appearing  RESPIRATORY: Normal respiratory effort; lungs are clear to auscultation bilaterally  CARDIOVASCULAR: Regular rate and rhythm, normal S1 and S2, no murmur/rub/gallop; No lower extremity edema  ABDOMEN: Nontender to palpation, normoactive bowel sounds, no rebound/guarding, +PEG  MUSCLOSKELETAL: no clubbing or cyanosis of digits; contracted extremities   PSYCH: A+Ox0     LABS:                        8.6    9.30  )-----------( 211      ( 14 Feb 2024 07:47 )             28.9     02-14    142  |  105  |  19  ----------------------------<  113<H>  4.3   |  26  |  <0.30<L>    Ca    8.6      14 Feb 2024 07:47  Phos  2.8     02-14  Mg     2.3     02-14    TPro  6.7  /  Alb  3.4  /  TBili  0.5  /  DBili  x   /  AST  19  /  ALT  12  /  AlkPhos  88  02-13    PT/INR - ( 13 Feb 2024 06:14 )   PT: 12.4 sec;   INR: 1.19 ratio         PTT - ( 13 Feb 2024 06:14 )  PTT:27.4 sec      Urinalysis Basic - ( 14 Feb 2024 07:47 )    Color: x / Appearance: x / SG: x / pH: x  Gluc: 113 mg/dL / Ketone: x  / Bili: x / Urobili: x   Blood: x / Protein: x / Nitrite: x   Leuk Esterase: x / RBC: x / WBC x   Sq Epi: x / Non Sq Epi: x / Bacteria: x

## 2024-02-15 LAB
ANION GAP SERPL CALC-SCNC: 9 MMOL/L — SIGNIFICANT CHANGE UP (ref 5–17)
BUN SERPL-MCNC: 16 MG/DL — SIGNIFICANT CHANGE UP (ref 7–23)
CALCIUM SERPL-MCNC: 8.6 MG/DL — SIGNIFICANT CHANGE UP (ref 8.4–10.5)
CHLORIDE SERPL-SCNC: 105 MMOL/L — SIGNIFICANT CHANGE UP (ref 96–108)
CO2 SERPL-SCNC: 28 MMOL/L — SIGNIFICANT CHANGE UP (ref 22–31)
CREAT SERPL-MCNC: <0.3 MG/DL — LOW (ref 0.5–1.3)
EGFR: 114 ML/MIN/1.73M2 — SIGNIFICANT CHANGE UP
GLUCOSE BLDC GLUCOMTR-MCNC: 122 MG/DL — HIGH (ref 70–99)
GLUCOSE BLDC GLUCOMTR-MCNC: 139 MG/DL — HIGH (ref 70–99)
GLUCOSE BLDC GLUCOMTR-MCNC: 146 MG/DL — HIGH (ref 70–99)
GLUCOSE BLDC GLUCOMTR-MCNC: 146 MG/DL — HIGH (ref 70–99)
GLUCOSE BLDC GLUCOMTR-MCNC: 157 MG/DL — HIGH (ref 70–99)
GLUCOSE SERPL-MCNC: 131 MG/DL — HIGH (ref 70–99)
HCT VFR BLD CALC: 29.1 % — LOW (ref 34.5–45)
HGB BLD-MCNC: 8.5 G/DL — LOW (ref 11.5–15.5)
MAGNESIUM SERPL-MCNC: 2.2 MG/DL — SIGNIFICANT CHANGE UP (ref 1.6–2.6)
MCHC RBC-ENTMCNC: 27.2 PG — SIGNIFICANT CHANGE UP (ref 27–34)
MCHC RBC-ENTMCNC: 29.2 GM/DL — LOW (ref 32–36)
MCV RBC AUTO: 93 FL — SIGNIFICANT CHANGE UP (ref 80–100)
NRBC # BLD: 0 /100 WBCS — SIGNIFICANT CHANGE UP (ref 0–0)
PHOSPHATE SERPL-MCNC: 2.5 MG/DL — SIGNIFICANT CHANGE UP (ref 2.5–4.5)
PLATELET # BLD AUTO: 192 K/UL — SIGNIFICANT CHANGE UP (ref 150–400)
POTASSIUM SERPL-MCNC: 4.1 MMOL/L — SIGNIFICANT CHANGE UP (ref 3.5–5.3)
POTASSIUM SERPL-SCNC: 4.1 MMOL/L — SIGNIFICANT CHANGE UP (ref 3.5–5.3)
RBC # BLD: 3.13 M/UL — LOW (ref 3.8–5.2)
RBC # FLD: 18.8 % — HIGH (ref 10.3–14.5)
SODIUM SERPL-SCNC: 142 MMOL/L — SIGNIFICANT CHANGE UP (ref 135–145)
WBC # BLD: 8.63 K/UL — SIGNIFICANT CHANGE UP (ref 3.8–10.5)
WBC # FLD AUTO: 8.63 K/UL — SIGNIFICANT CHANGE UP (ref 3.8–10.5)

## 2024-02-15 PROCEDURE — 99232 SBSQ HOSP IP/OBS MODERATE 35: CPT | Mod: GC

## 2024-02-15 PROCEDURE — 99232 SBSQ HOSP IP/OBS MODERATE 35: CPT

## 2024-02-15 RX ORDER — MIDODRINE HYDROCHLORIDE 2.5 MG/1
10 TABLET ORAL EVERY 8 HOURS
Refills: 0 | Status: DISCONTINUED | OUTPATIENT
Start: 2024-02-15 | End: 2024-02-20

## 2024-02-15 RX ORDER — SENNA PLUS 8.6 MG/1
2 TABLET ORAL AT BEDTIME
Refills: 0 | Status: DISCONTINUED | OUTPATIENT
Start: 2024-02-15 | End: 2024-02-20

## 2024-02-15 RX ADMIN — CHLORHEXIDINE GLUCONATE 1 APPLICATION(S): 213 SOLUTION TOPICAL at 06:12

## 2024-02-15 RX ADMIN — Medication 100 MILLIGRAM(S): at 12:20

## 2024-02-15 RX ADMIN — SODIUM CHLORIDE 4 MILLILITER(S): 9 INJECTION INTRAMUSCULAR; INTRAVENOUS; SUBCUTANEOUS at 12:21

## 2024-02-15 RX ADMIN — POLYETHYLENE GLYCOL 3350 17 GRAM(S): 17 POWDER, FOR SOLUTION ORAL at 12:20

## 2024-02-15 RX ADMIN — HEPARIN SODIUM 5000 UNIT(S): 5000 INJECTION INTRAVENOUS; SUBCUTANEOUS at 18:21

## 2024-02-15 RX ADMIN — DROXIDOPA 300 MILLIGRAM(S): 100 CAPSULE ORAL at 13:43

## 2024-02-15 RX ADMIN — Medication 100 MILLIGRAM(S): at 06:09

## 2024-02-15 RX ADMIN — Medication 100 MILLIGRAM(S): at 06:11

## 2024-02-15 RX ADMIN — Medication 100 MILLIGRAM(S): at 18:18

## 2024-02-15 RX ADMIN — MIDODRINE HYDROCHLORIDE 10 MILLIGRAM(S): 2.5 TABLET ORAL at 22:10

## 2024-02-15 RX ADMIN — Medication 1: at 01:01

## 2024-02-15 RX ADMIN — Medication 100 MILLIGRAM(S): at 22:08

## 2024-02-15 RX ADMIN — Medication 1 MILLIGRAM(S): at 12:21

## 2024-02-15 RX ADMIN — Medication 1 APPLICATION(S): at 06:12

## 2024-02-15 RX ADMIN — HEPARIN SODIUM 5000 UNIT(S): 5000 INJECTION INTRAVENOUS; SUBCUTANEOUS at 06:10

## 2024-02-15 RX ADMIN — Medication 1 APPLICATION(S): at 18:18

## 2024-02-15 RX ADMIN — Medication 100 MILLIGRAM(S): at 13:43

## 2024-02-15 RX ADMIN — Medication 100 MILLIGRAM(S): at 12:21

## 2024-02-15 RX ADMIN — DROXIDOPA 300 MILLIGRAM(S): 100 CAPSULE ORAL at 06:11

## 2024-02-15 RX ADMIN — Medication 100 MILLIGRAM(S): at 13:42

## 2024-02-15 RX ADMIN — MIDODRINE HYDROCHLORIDE 10 MILLIGRAM(S): 2.5 TABLET ORAL at 13:44

## 2024-02-15 RX ADMIN — Medication 500 MILLIGRAM(S): at 12:21

## 2024-02-15 RX ADMIN — Medication 1 DROP(S): at 12:19

## 2024-02-15 RX ADMIN — SENNA PLUS 2 TABLET(S): 8.6 TABLET ORAL at 22:10

## 2024-02-15 RX ADMIN — Medication 1 DROP(S): at 22:09

## 2024-02-15 RX ADMIN — Medication 80 MICROGRAM(S): at 22:09

## 2024-02-15 RX ADMIN — SERTRALINE 25 MILLIGRAM(S): 25 TABLET, FILM COATED ORAL at 12:21

## 2024-02-15 RX ADMIN — SODIUM CHLORIDE 4 MILLILITER(S): 9 INJECTION INTRAMUSCULAR; INTRAVENOUS; SUBCUTANEOUS at 06:10

## 2024-02-15 RX ADMIN — TAMOXIFEN CITRATE 20 MILLIGRAM(S): 20 TABLET, FILM COATED ORAL at 12:21

## 2024-02-15 RX ADMIN — Medication 3 MILLILITER(S): at 06:09

## 2024-02-15 RX ADMIN — DROXIDOPA 300 MILLIGRAM(S): 100 CAPSULE ORAL at 22:09

## 2024-02-15 RX ADMIN — Medication 3 MILLILITER(S): at 18:19

## 2024-02-15 RX ADMIN — SODIUM CHLORIDE 4 MILLILITER(S): 9 INJECTION INTRAMUSCULAR; INTRAVENOUS; SUBCUTANEOUS at 18:19

## 2024-02-15 RX ADMIN — MIDODRINE HYDROCHLORIDE 10 MILLIGRAM(S): 2.5 TABLET ORAL at 06:11

## 2024-02-15 RX ADMIN — Medication 1 TABLET(S): at 12:21

## 2024-02-15 RX ADMIN — Medication 3 MILLILITER(S): at 12:20

## 2024-02-15 NOTE — PROVIDER CONTACT NOTE (OTHER) - SITUATION
Pt admitted on 1/30 for sepsis, transferred from MICU to [address] [place] [address] on 2/7.
Hypoxia
Patient unable to tolerate Robitussin at this time.
Patient coughing and making gurgling sounds
Pt bradycardic HR 46-50s.
Patient transferred off MICU 2/7 on midodrine and droxidopa. Current BP is 93/63.
Suctioning completed as needed however patient still coughing on secretions. Deep suction with respiratory therapy complete & duoneb given.
Hold meds through PEG and tube feeds?

## 2024-02-15 NOTE — PROVIDER CONTACT NOTE (OTHER) - DATE AND TIME:
02-Feb-2024 20:29
13-Feb-2024 05:43
10-Feb-2024 02:30
08-Feb-2024 23:00
08-Feb-2024 00:40
13-Feb-2024 06:15
10-Feb-2024 01:20
15-Feb-2024 04:28

## 2024-02-15 NOTE — PROGRESS NOTE ADULT - SUBJECTIVE AND OBJECTIVE BOX
PROGRESS NOTE:   Authored by Dr. Ciera Phillips MD, Available on MS Teams    Patient is a 68y old  Female who presents with a chief complaint of Sepsis 2/2 osteomyelitis (15 Feb 2024 12:25)      SUBJECTIVE / OVERNIGHT EVENTS: Unable to obtain hx due to mental status. Transitioned back to HFNC overnight. Spangler catheter placed     ADDITIONAL REVIEW OF SYSTEMS:    MEDICATIONS  (STANDING):  albuterol/ipratropium for Nebulization 3 milliLiter(s) Nebulizer every 6 hours  artificial  tears Solution 1 Drop(s) Both EYES two times a day  ascorbic acid 500 milliGRAM(s) Oral daily  ceFAZolin   IVPB 2000 milliGRAM(s) IV Intermittent every 8 hours  ceFAZolin   IVPB      chlorhexidine 2% Cloths 1 Application(s) Topical <User Schedule>  Dakins Solution - 1/4 Strength 1 Application(s) Topical every 12 hours  droxidopa 300 milliGRAM(s) Oral every 8 hours  folic acid 1 milliGRAM(s) Oral daily  heparin   Injectable 5000 Unit(s) SubCutaneous every 12 hours  insulin lispro (ADMELOG) corrective regimen sliding scale   SubCutaneous every 6 hours  levothyroxine Injectable 80 MICROGram(s) IV Push at bedtime  metroNIDAZOLE  IVPB 500 milliGRAM(s) IV Intermittent every 12 hours  midodrine 10 milliGRAM(s) Oral every 8 hours  multivitamin 1 Tablet(s) Oral daily  polyethylene glycol 3350 17 Gram(s) Oral daily  senna 2 Tablet(s) Oral at bedtime  sertraline 25 milliGRAM(s) Oral daily  sodium chloride 3%  Inhalation 4 milliLiter(s) Inhalation every 6 hours  tamoxifen 20 milliGRAM(s) Oral daily  thiamine 100 milliGRAM(s) Oral daily    MEDICATIONS  (PRN):      CAPILLARY BLOOD GLUCOSE      POCT Blood Glucose.: 146 mg/dL (15 Feb 2024 11:54)  POCT Blood Glucose.: 146 mg/dL (15 Feb 2024 11:54)  POCT Blood Glucose.: 139 mg/dL (15 Feb 2024 06:42)  POCT Blood Glucose.: 157 mg/dL (15 Feb 2024 00:07)  POCT Blood Glucose.: 122 mg/dL (14 Feb 2024 19:02)    I&O's Summary    14 Feb 2024 07:01  -  15 Feb 2024 07:00  --------------------------------------------------------  IN: 1320 mL / OUT: 400 mL / NET: 920 mL    15 Feb 2024 07:01  -  15 Feb 2024 14:13  --------------------------------------------------------  IN: 0 mL / OUT: 500 mL / NET: -500 mL        PHYSICAL EXAM:  Vital Signs Last 24 Hrs  T(C): 36.5 (15 Feb 2024 13:27), Max: 37.1 (14 Feb 2024 21:44)  T(F): 97.7 (15 Feb 2024 13:27), Max: 98.7 (14 Feb 2024 21:44)  HR: 86 (15 Feb 2024 13:27) (67 - 98)  BP: 100/65 (15 Feb 2024 13:27) (75/56 - 136/80)  BP(mean): --  RR: 17 (15 Feb 2024 13:27) (17 - 18)  SpO2: 95% (15 Feb 2024 13:27) (90% - 100%)    Parameters below as of 15 Feb 2024 13:27  Patient On (Oxygen Delivery Method): nasal cannula, high flow  O2 Flow (L/min): 40  O2 Concentration (%): 55    CONSTITUTIONAL: ill appearing   RESPIRATORY: Normal respiratory effort; no wheezing  CARDIOVASCULAR: Regular rate and rhythm, normal S1 and S2, no murmur/rub/gallop; No lower extremity edema  ABDOMEN: Nontender to palpation, normoactive bowel sounds, no rebound/guarding, +PEG  MUSCLOSKELETAL: contracted   PSYCH: A+Ox0    LABS:                        8.5    8.63  )-----------( 192      ( 15 Feb 2024 09:34 )             29.1     02-15    142  |  105  |  16  ----------------------------<  131<H>  4.1   |  28  |  <0.30<L>    Ca    8.6      15 Feb 2024 09:34  Phos  2.5     02-15  Mg     2.2     02-15            Urinalysis Basic - ( 15 Feb 2024 09:34 )    Color: x / Appearance: x / SG: x / pH: x  Gluc: 131 mg/dL / Ketone: x  / Bili: x / Urobili: x   Blood: x / Protein: x / Nitrite: x   Leuk Esterase: x / RBC: x / WBC x   Sq Epi: x / Non Sq Epi: x / Bacteria: x=

## 2024-02-15 NOTE — PROGRESS NOTE ADULT - ASSESSMENT
68 f with  TBI, SAH, non-verbal at baseline, CVA, sacral ulcer, graves, L breast ca s/p mastectomy on tamoxifen, aspiration s/p PEG, multiple decubiti and sacral osteo, sent from NH for  multiple days of less responsiveness and fever, here febrile to 103.4, tachycardic, hypotensive to 70s presents from [facility] due to multiple days of reduced responsiveness and fever. Found to have leukocytosis, tachycardia and hypotension here as well. Difficult to assess, contracted and non-verbal.  has multiple wound with necrotic edge  WBC: 14, Na: 151, u/a with 17 WBC  CT: no PE, sacral decub to the level of sacrum and ?osteo      fever, tachycardia, hypotension, leukocytosis, transaminitis, hypernatremia septic shock, due to MSSA bacteremia, ? wound related   has multiple wound which have slough but no jim purulent drainage and the MRI did not show osteo or abscess, just a sinus tract extending from L3  first blood cx had only 1/4 MSSA and repeat blood cx negative but pt deteriorated, TTE was not able to evaluate any valves due to contractures  s/p cefepime 2 q 8 and flagyl 500 q 12 on 2/5-2/12, now back on cefazolin MSSA bacteremia,   now with hypoxia, ?aspiration but CXR with improved R lung aeration   * will complete a 4 week course of antibiotics post negative blood cx through 2/29  * if worsening status or signs of new infections switch to zosyn  * monitor CBC/diff and CMP  * continue wound care  * aspiration precautions    The above assessment and plan was discussed with the primary team    Katherin Teixeira MD  contact on teams  After 5pm and on weekends call [phone]

## 2024-02-15 NOTE — PROGRESS NOTE ADULT - SUBJECTIVE AND OBJECTIVE BOX
DATE OF SERVICE: 02-15-24 @ 07:35    Patient is a 68y old  Female who presents with a chief complaint of Sepsis 2/2 osteomyelitis (14 Feb 2024 15:48)      INTERVAL HISTORY:     REVIEW OF SYSTEMS:  CONSTITUTIONAL: No weakness  EYES/ENT: No visual changes;  No throat pain   NECK: No pain or stiffness  RESPIRATORY: No cough, wheezing; No shortness of breath  CARDIOVASCULAR: No chest pain or palpitations  GASTROINTESTINAL: No abdominal  pain. No nausea, vomiting, or hematemesis  GENITOURINARY: No dysuria, frequency or hematuria  NEUROLOGICAL: No stroke like symptoms  SKIN: No rashes    TELEMETRY Personally reviewed: SR-ST  currently SR 70   	  MEDICATIONS:  droxidopa 300 milliGRAM(s) Oral every 8 hours  midodrine 10 milliGRAM(s) Oral every 8 hours        PHYSICAL EXAM:  T(C): 36.4 (02-15-24 @ 06:06), Max: 37.1 (02-14-24 @ 13:43)  HR: 83 (02-15-24 @ 06:06) (66 - 98)  BP: 101/69 (02-15-24 @ 06:06) (75/56 - 136/80)  RR: 18 (02-15-24 @ 06:06) (18 - 20)  SpO2: 100% (02-15-24 @ 06:06) (90% - 100%)  Wt(kg): --  I&O's Summary    14 Feb 2024 07:01  -  15 Feb 2024 07:00  --------------------------------------------------------  IN: 610 mL / OUT: 400 mL / NET: 210 mL          Appearance: In no distress	  HEENT:    PERRL, EOMI	  Cardiovascular:  S1 S2, No JVD  Respiratory: Lungs clear to auscultation	  Gastrointestinal:  Soft, Non-tender, + BS	  Vascularature:  No edema of LE  Psychiatric: Appropriate affect   Neuro: no acute focal deficits                               8.6    9.30  )-----------( 211      ( 14 Feb 2024 07:47 )             28.9     02-14    142  |  105  |  19  ----------------------------<  113<H>  4.3   |  26  |  <0.30<L>    Ca    8.6      14 Feb 2024 07:47  Phos  2.8     02-14  Mg     2.3     02-14          Labs personally reviewed      ASSESSMENT/PLAN: 	  67 yo F with hx TBI (nonverbal, bedbound/contracted at baseline) s/p PEG last admission due to recurrent hypernatremia and chronic malnutrition, CVA, hypothyroidism, L breast cancer (s/p mastectomy tissue expander still in place ) who presented for decreased responsiveness and hypotension, found to have sepsis with MSSA bacteremia, likely source sacral ulcer w/ c/f possible osteomyelitis. Subsequently required ICU care given hypotension 2/2 likely septic shock, requiring pressors.       1. Problem/Plan:  Problem: Hypotension   Septic Shock 2/2 Bacteremia with MSSA.   - Previously in MICU requiring pressors, now off levo   -c/w Midodrine 10mg PO q8h  - c/w droxidopa - decreased to 300 mg q8h    2. Problem/Plan:  Problem: Hypothyroidism.   - Continue levothyroxine  - TSH wnl.    3. Problem/Plan:  Problem: DVT ppx  - Continue subq Heparin            DAVIDA Warren DO [facility]  Cardiovascular Medicine  [address] [place] [address], [address]  Available through call or text on [employer]  Office: [phone]

## 2024-02-15 NOTE — PROVIDER CONTACT NOTE (OTHER) - ACTION/TREATMENT ORDERED:
ABG ordered & Stat CXR.
HOB remaining elevated - suction at bedside. Cont to monitor pt breathe sounds / breathing pattern / O2 saturation.
Per PA, do not give PEG tube meds and hold off on starting tube feeds.
EKG and continue to monitor.
RRT called, see note
JOSUE Taylor made aware. Patient cleaned and dressings changed. No further interventions at this time.
VS monitored as per orders. Awaiting respiratory therapist now
1x dose of midodrine given. Will cont to monitor

## 2024-02-15 NOTE — PROVIDER CONTACT NOTE (OTHER) - NAME OF MD/NP/PA/DO NOTIFIED:
JOSUE Taylor
JOSUE Monteiro
GARRET Spence
Inessa Dunham, PA
GARRET Spence
JOSUE Dunham
JOSUE Marie
Kosta Ezring

## 2024-02-15 NOTE — PROVIDER CONTACT NOTE (OTHER) - BACKGROUND
Patient admitted for sepsis.
PMH of TBI, breast cancer. Pt currently has multiple pressure injuries & PEG tube.
Patient AOx1, mainly nonverbal. PMH TBI/CVA/malnutrition. Admitted on 01/30/2024 for sepsis. Transferred from MICU to [address] [place] [address] on 02/07.
Pt admitted on 1/30 with sepsis. HFNC on throughout the night with SpO2 %.
Patient admitted on 1/30 with sepsis. RRT called this morning for hypoxia.
Pt admitted for sepsis. PMH of multiple falls, SAH, SVT, Breast CA, Hyperthyroidism, hypothyroidism, TBI and MDD.
Pt on tube feeding at 55ml/hr
unknown time levofed was stopped in MICU before pt was transferred

## 2024-02-15 NOTE — PROGRESS NOTE ADULT - SUBJECTIVE AND OBJECTIVE BOX
Follow Up:  sepsis    Interval History/ROS: pt afebrile and WBC normal, was switched to NC yesterday but desaturated and back on high flow    ROS:    Unobtainable because: non verbal        Allergies  Tapazole (Hives)        ANTIMICROBIALS:  ceFAZolin   IVPB    ceFAZolin   IVPB 2000 every 8 hours  metroNIDAZOLE  IVPB 500 every 12 hours      OTHER MEDS:  albuterol/ipratropium for Nebulization 3 milliLiter(s) Nebulizer every 6 hours  artificial  tears Solution 1 Drop(s) Both EYES two times a day  ascorbic acid 500 milliGRAM(s) Oral daily  chlorhexidine 2% Cloths 1 Application(s) Topical <User Schedule>  Dakins Solution - 1/4 Strength 1 Application(s) Topical every 12 hours  droxidopa 300 milliGRAM(s) Oral every 8 hours  folic acid 1 milliGRAM(s) Oral daily  guaiFENesin Oral Liquid (Sugar-Free) 100 milliGRAM(s) Oral every 4 hours  heparin   Injectable 5000 Unit(s) SubCutaneous every 12 hours  insulin lispro (ADMELOG) corrective regimen sliding scale   SubCutaneous every 6 hours  levothyroxine Injectable 80 MICROGram(s) IV Push at bedtime  midodrine 10 milliGRAM(s) Oral every 8 hours  multivitamin 1 Tablet(s) Oral daily  polyethylene glycol 3350 17 Gram(s) Oral daily  senna 2 Tablet(s) Oral at bedtime  sertraline 25 milliGRAM(s) Oral daily  sodium chloride 3%  Inhalation 4 milliLiter(s) Inhalation every 6 hours  tamoxifen 20 milliGRAM(s) Oral daily  thiamine 100 milliGRAM(s) Oral daily      Vital Signs Last 24 Hrs  T(C): 36.5 (15 Feb 2024 09:36), Max: 37.1 (14 Feb 2024 13:43)  T(F): 97.7 (15 Feb 2024 09:36), Max: 98.7 (14 Feb 2024 13:43)  HR: 68 (15 Feb 2024 09:36) (67 - 98)  BP: 112/77 (15 Feb 2024 09:36) (75/56 - 136/80)  BP(mean): --  RR: 18 (15 Feb 2024 09:36) (18 - 20)  SpO2: 97% (15 Feb 2024 09:36) (90% - 100%)    Parameters below as of 15 Feb 2024 09:36  Patient On (Oxygen Delivery Method): nasal cannula, high flow  O2 Flow (L/min): 40  O2 Concentration (%): 55    Physical Exam:  General:    cachectic  Respiratory:   on highflow  abd:   soft, PEG with brownish drainage around it  :   no avila  Musculoskeletal : contracted, no joint swelling, no edema  Skin: multiple wounds including sacral, b/l hip, R upper buttock, L shoulder, thoracic back  vascular: R brachial midline                          8.5    8.63  )-----------( 192      ( 15 Feb 2024 09:34 )             29.1       02-15    142  |  105  |  16  ----------------------------<  131<H>  4.1   |  28  |  <0.30<L>    Ca    8.6      15 Feb 2024 09:34  Phos  2.5     02-15  Mg     2.2     02-15        Urinalysis Basic - ( 15 Feb 2024 09:34 )    Color: x / Appearance: x / SG: x / pH: x  Gluc: 131 mg/dL / Ketone: x  / Bili: x / Urobili: x   Blood: x / Protein: x / Nitrite: x   Leuk Esterase: x / RBC: x / WBC x   Sq Epi: x / Non Sq Epi: x / Bacteria: x        MICROBIOLOGY:  v  .Genital Vaginal  02-03-24   Normal genital forrest isolated  --  --      .Blood Blood-Peripheral  02-02-24   No growth at 5 days  --  --      .Blood Blood-Peripheral  02-02-24   No growth at 5 days  --  --      .Blood Blood-Peripheral  02-02-24   No growth at 5 days  --  --      .Blood Blood-Peripheral  02-01-24   No growth at 5 days  --  --      Clean Catch Clean Catch (Midstream)  01-30-24   <10,000 CFU/mL Normal Urogenital Forrest  --  --      .Blood Blood-Peripheral  01-30-24   Growth in aerobic and anaerobic bottles: Staphylococcus aureus  Direct identification is available within approximately 3-5  hours either by Blood Panel Multiplexed PCR or Direct  MALDI-TOF. Details: [email or web address]  --  Blood Culture PCR  Staphylococcus aureus      .Blood Blood-Peripheral  01-30-24   No growth at 5 days  --  --      Catheterized Catheterized  01-18-24   <10,000 CFU/mL Normal Urogenital Forrest  --  --                RADIOLOGY:  Images independently visualized and reviewed personally, findings as below  < from: Xray Chest 1 View- PORTABLE-Urgent (Xray Chest 1 View- PORTABLE-Urgent .) (02.13.24 @ 06:55) >  IMPRESSION:  Interval decrease in right-sided pleural effusion.  Small left pleural effusion and atelectasis, not significantly changed.      < end of copied text >  < from: MR Lumbar Spine w/ IV Cont (02.01.24 @ 08:56) >  IMPRESSION:  Prominent sacral decubitus ulcer extending to the coccygeal tip without   evidence of underlying osteomyelitis or discrete fluid collections in the   surrounding soft tissues.    Sinus tract is seen extending from the skin surface to the L3 spinous   process which may represent additional ulcer. Recommend correlation with   clinical aspects of the case. No evidence of osteomyelitis in the   underlying L3 spinous process.    Multilevel chronic compression deformities and lumbar spondylosis as   described.      < end of copied text >

## 2024-02-16 LAB
ANION GAP SERPL CALC-SCNC: 14 MMOL/L — SIGNIFICANT CHANGE UP (ref 5–17)
BUN SERPL-MCNC: 16 MG/DL — SIGNIFICANT CHANGE UP (ref 7–23)
CALCIUM SERPL-MCNC: 8.5 MG/DL — SIGNIFICANT CHANGE UP (ref 8.4–10.5)
CHLORIDE SERPL-SCNC: 103 MMOL/L — SIGNIFICANT CHANGE UP (ref 96–108)
CO2 SERPL-SCNC: 21 MMOL/L — LOW (ref 22–31)
CREAT SERPL-MCNC: <0.3 MG/DL — LOW (ref 0.5–1.3)
EGFR: 114 ML/MIN/1.73M2 — SIGNIFICANT CHANGE UP
GLUCOSE BLDC GLUCOMTR-MCNC: 128 MG/DL — HIGH (ref 70–99)
GLUCOSE BLDC GLUCOMTR-MCNC: 147 MG/DL — HIGH (ref 70–99)
GLUCOSE BLDC GLUCOMTR-MCNC: 152 MG/DL — HIGH (ref 70–99)
GLUCOSE BLDC GLUCOMTR-MCNC: 167 MG/DL — HIGH (ref 70–99)
GLUCOSE BLDC GLUCOMTR-MCNC: 176 MG/DL — HIGH (ref 70–99)
GLUCOSE SERPL-MCNC: 139 MG/DL — HIGH (ref 70–99)
HCT VFR BLD CALC: 30.5 % — LOW (ref 34.5–45)
HGB BLD-MCNC: 9 G/DL — LOW (ref 11.5–15.5)
MAGNESIUM SERPL-MCNC: 2.1 MG/DL — SIGNIFICANT CHANGE UP (ref 1.6–2.6)
MCHC RBC-ENTMCNC: 28 PG — SIGNIFICANT CHANGE UP (ref 27–34)
MCHC RBC-ENTMCNC: 29.5 GM/DL — LOW (ref 32–36)
MCV RBC AUTO: 94.7 FL — SIGNIFICANT CHANGE UP (ref 80–100)
NRBC # BLD: 0 /100 WBCS — SIGNIFICANT CHANGE UP (ref 0–0)
PHOSPHATE SERPL-MCNC: 2.7 MG/DL — SIGNIFICANT CHANGE UP (ref 2.5–4.5)
PLATELET # BLD AUTO: 127 K/UL — LOW (ref 150–400)
POTASSIUM SERPL-MCNC: 4.9 MMOL/L — SIGNIFICANT CHANGE UP (ref 3.5–5.3)
POTASSIUM SERPL-SCNC: 4.9 MMOL/L — SIGNIFICANT CHANGE UP (ref 3.5–5.3)
RBC # BLD: 3.22 M/UL — LOW (ref 3.8–5.2)
RBC # FLD: 18.8 % — HIGH (ref 10.3–14.5)
SODIUM SERPL-SCNC: 138 MMOL/L — SIGNIFICANT CHANGE UP (ref 135–145)
WBC # BLD: 8.07 K/UL — SIGNIFICANT CHANGE UP (ref 3.8–10.5)
WBC # FLD AUTO: 8.07 K/UL — SIGNIFICANT CHANGE UP (ref 3.8–10.5)

## 2024-02-16 PROCEDURE — 99232 SBSQ HOSP IP/OBS MODERATE 35: CPT

## 2024-02-16 RX ADMIN — MIDODRINE HYDROCHLORIDE 10 MILLIGRAM(S): 2.5 TABLET ORAL at 05:30

## 2024-02-16 RX ADMIN — SODIUM CHLORIDE 4 MILLILITER(S): 9 INJECTION INTRAMUSCULAR; INTRAVENOUS; SUBCUTANEOUS at 13:15

## 2024-02-16 RX ADMIN — MIDODRINE HYDROCHLORIDE 10 MILLIGRAM(S): 2.5 TABLET ORAL at 21:26

## 2024-02-16 RX ADMIN — Medication 1: at 06:38

## 2024-02-16 RX ADMIN — Medication 100 MILLIGRAM(S): at 21:25

## 2024-02-16 RX ADMIN — DROXIDOPA 300 MILLIGRAM(S): 100 CAPSULE ORAL at 05:09

## 2024-02-16 RX ADMIN — Medication 1 DROP(S): at 09:33

## 2024-02-16 RX ADMIN — Medication 100 MILLIGRAM(S): at 13:11

## 2024-02-16 RX ADMIN — SERTRALINE 25 MILLIGRAM(S): 25 TABLET, FILM COATED ORAL at 13:10

## 2024-02-16 RX ADMIN — SODIUM CHLORIDE 4 MILLILITER(S): 9 INJECTION INTRAMUSCULAR; INTRAVENOUS; SUBCUTANEOUS at 00:49

## 2024-02-16 RX ADMIN — MIDODRINE HYDROCHLORIDE 10 MILLIGRAM(S): 2.5 TABLET ORAL at 13:11

## 2024-02-16 RX ADMIN — Medication 80 MICROGRAM(S): at 21:25

## 2024-02-16 RX ADMIN — DROXIDOPA 300 MILLIGRAM(S): 100 CAPSULE ORAL at 21:26

## 2024-02-16 RX ADMIN — HEPARIN SODIUM 5000 UNIT(S): 5000 INJECTION INTRAVENOUS; SUBCUTANEOUS at 17:59

## 2024-02-16 RX ADMIN — Medication 100 MILLIGRAM(S): at 05:08

## 2024-02-16 RX ADMIN — Medication 100 MILLIGRAM(S): at 05:48

## 2024-02-16 RX ADMIN — Medication 3 MILLILITER(S): at 13:09

## 2024-02-16 RX ADMIN — Medication 1 DROP(S): at 21:25

## 2024-02-16 RX ADMIN — SODIUM CHLORIDE 4 MILLILITER(S): 9 INJECTION INTRAMUSCULAR; INTRAVENOUS; SUBCUTANEOUS at 05:30

## 2024-02-16 RX ADMIN — CHLORHEXIDINE GLUCONATE 1 APPLICATION(S): 213 SOLUTION TOPICAL at 05:09

## 2024-02-16 RX ADMIN — Medication 1: at 13:06

## 2024-02-16 RX ADMIN — HEPARIN SODIUM 5000 UNIT(S): 5000 INJECTION INTRAVENOUS; SUBCUTANEOUS at 05:09

## 2024-02-16 RX ADMIN — Medication 100 MILLIGRAM(S): at 13:39

## 2024-02-16 RX ADMIN — Medication 1 MILLIGRAM(S): at 13:11

## 2024-02-16 RX ADMIN — Medication 3 MILLILITER(S): at 05:08

## 2024-02-16 RX ADMIN — Medication 1 APPLICATION(S): at 17:59

## 2024-02-16 RX ADMIN — Medication 1 APPLICATION(S): at 05:08

## 2024-02-16 RX ADMIN — DROXIDOPA 300 MILLIGRAM(S): 100 CAPSULE ORAL at 13:38

## 2024-02-16 RX ADMIN — Medication 3 MILLILITER(S): at 00:49

## 2024-02-16 RX ADMIN — Medication 1: at 17:58

## 2024-02-16 RX ADMIN — SENNA PLUS 2 TABLET(S): 8.6 TABLET ORAL at 21:26

## 2024-02-16 RX ADMIN — POLYETHYLENE GLYCOL 3350 17 GRAM(S): 17 POWDER, FOR SOLUTION ORAL at 13:10

## 2024-02-16 RX ADMIN — Medication 500 MILLIGRAM(S): at 13:11

## 2024-02-16 RX ADMIN — Medication 1 TABLET(S): at 13:10

## 2024-02-16 RX ADMIN — Medication 3 MILLILITER(S): at 17:59

## 2024-02-16 RX ADMIN — SODIUM CHLORIDE 4 MILLILITER(S): 9 INJECTION INTRAMUSCULAR; INTRAVENOUS; SUBCUTANEOUS at 18:06

## 2024-02-16 RX ADMIN — Medication 100 MILLIGRAM(S): at 17:58

## 2024-02-16 RX ADMIN — TAMOXIFEN CITRATE 20 MILLIGRAM(S): 20 TABLET, FILM COATED ORAL at 13:11

## 2024-02-16 NOTE — PROGRESS NOTE ADULT - PROBLEM SELECTOR PLAN 5
Quality 431: Preventive Care And Screening: Unhealthy Alcohol Use - Screening: Patient screened for unhealthy alcohol use using a single question and scores less than 2 times per year Detail Level: Detailed Quality 226: Preventive Care And Screening: Tobacco Use: Screening And Cessation Intervention: Patient screened for tobacco use and is an ex/non-smoker Quality 130: Documentation Of Current Medications In The Medical Record: Current Medications Documented - Cw tamoxifen.

## 2024-02-16 NOTE — PROGRESS NOTE ADULT - ASSESSMENT
68 f with  TBI, SAH, non-verbal at baseline, CVA, sacral ulcer, graves, L breast ca s/p mastectomy on tamoxifen, aspiration s/p PEG, multiple decubiti and sacral osteo, sent from NH for  multiple days of less responsiveness and fever, here febrile to 103.4, tachycardic, hypotensive to 70s presents from [facility] [place] due to multiple days of reduced responsiveness and fever. Found to have leukocytosis, tachycardia and hypotension here as well. Difficult to assess, contracted and non-verbal.  has multiple wound with necrotic edge  WBC: 14, Na: 151, u/a with 17 WBC  CT: no PE, sacral decub to the level of sacrum and ?osteo      fever, tachycardia, hypotension, leukocytosis, transaminitis, hypernatremia septic shock, due to MSSA bacteremia, ? wound related   has multiple wound which have slough but no jim purulent drainage and the MRI did not show osteo or abscess, just a sinus tract extending from L3  first blood cx had only 1/4 MSSA and repeat blood cx negative but pt deteriorated, TTE was not able to evaluate any valves due to contractures  s/p cefepime 2 q 8 and flagyl 500 q 12 on 2/5-2/12, now back on cefazolin MSSA bacteremia,   hypoxia, ?aspiration but CXR with improved R lung aeration  and now back to NC  * will complete a 4 week course of antibiotics post negative blood cx through 2/29  * if worsening status or signs of new infections switch to zosyn  * monitor CBC/diff and CMP  * continue wound care  * aspiration precautions    The above assessment and plan was discussed with the primary team    Katherin Teixeira MD  contact on teams  After 5pm and on weekends call [phone]

## 2024-02-16 NOTE — PROGRESS NOTE ADULT - SUBJECTIVE AND OBJECTIVE BOX
DATE OF SERVICE: 02-16-24 @ 17:48    Patient is a 68y old  Female who presents with a chief complaint of Sepsis 2/2 osteomyelitis (16 Feb 2024 17:08)      INTERVAL HISTORY:     REVIEW OF SYSTEMS:  CONSTITUTIONAL: No weakness  EYES/ENT: No visual changes;  No throat pain   NECK: No pain or stiffness  RESPIRATORY: No cough, wheezing; No shortness of breath  CARDIOVASCULAR: No chest pain or palpitations  GASTROINTESTINAL: No abdominal  pain. No nausea, vomiting, or hematemesis  GENITOURINARY: No dysuria, frequency or hematuria  NEUROLOGICAL: No stroke like symptoms  SKIN: No rashes    TELEMETRY Personally reviewed: NSR 80s-100  	  MEDICATIONS:  droxidopa 300 milliGRAM(s) Oral every 8 hours  midodrine 10 milliGRAM(s) Oral every 8 hours        PHYSICAL EXAM:  T(C): 36.8 (02-16-24 @ 17:00), Max: 37.1 (02-16-24 @ 05:05)  HR: 92 (02-16-24 @ 17:00) (86 - 100)  BP: 105/70 (02-16-24 @ 17:00) (92/59 - 105/70)  RR: 18 (02-16-24 @ 17:00) (18 - 19)  SpO2: 97% (02-16-24 @ 17:00) (91% - 100%)  Wt(kg): --  I&O's Summary    15 Feb 2024 07:01  -  16 Feb 2024 07:00  --------------------------------------------------------  IN: 1985 mL / OUT: 1150 mL / NET: 835 mL    16 Feb 2024 07:01  -  16 Feb 2024 17:48  --------------------------------------------------------  IN: 1065 mL / OUT: 750 mL / NET: 315 mL          Appearance: In no distress	  HEENT:    PERRL, EOMI	  Cardiovascular:  S1 S2, No JVD  Respiratory: Lungs clear to auscultation	  Gastrointestinal:  Soft, Non-tender, + BS	  Vascularature:  No edema of LE  Psychiatric: Appropriate affect   Neuro: no acute focal deficits                               9.0    8.07  )-----------( 127      ( 16 Feb 2024 09:05 )             30.5     02-16    138  |  103  |  16  ----------------------------<  139<H>  4.9   |  21<L>  |  <0.30<L>    Ca    8.5      16 Feb 2024 09:05  Phos  2.7     02-16  Mg     2.1     02-16          Labs personally reviewed      ASSESSMENT/PLAN: 	  67 yo F with hx TBI (nonverbal, bedbound/contracted at baseline) s/p PEG last admission due to recurrent hypernatremia and chronic malnutrition, CVA, hypothyroidism, L breast cancer (s/p mastectomy tissue expander still in place ) who presented for decreased responsiveness and hypotension, found to have sepsis with MSSA bacteremia, likely source sacral ulcer w/ c/f possible osteomyelitis. Subsequently required ICU care given hypotension 2/2 likely septic shock, requiring pressors.       1. Problem/Plan:  Problem: Hypotension   Septic Shock 2/2 Bacteremia with MSSA.   - Previously in MICU requiring pressors, now off levo   -c/w Midodrine 10mg PO q8h  - c/w droxidopa - decreased to 300 mg q8h    2. Problem/Plan:  Problem: Hypothyroidism.   - Continue levothyroxine  - TSH wnl.    3. Problem/Plan:  Problem: DVT ppx  - Continue subq Heparin            Iolani Behrbom, AG-NP   Rajinder Quinones DO [facility]  Cardiovascular Medicine  [address] [facility], [address]  Available through call or text on [employer] TEAM[employer]  Office: [phone]

## 2024-02-16 NOTE — PROGRESS NOTE ADULT - SUBJECTIVE AND OBJECTIVE BOX
PROGRESS NOTE:   Authored by Dr. Ciera Phillips MD, Available on MS Teams    Patient is a 68y old  Female who presents with a chief complaint of Sepsis 2/2 osteomyelitis (16 Feb 2024 16:29)      SUBJECTIVE / OVERNIGHT EVENTS: No acute events overnight    ADDITIONAL REVIEW OF SYSTEMS:    MEDICATIONS  (STANDING):  albuterol/ipratropium for Nebulization 3 milliLiter(s) Nebulizer every 6 hours  artificial  tears Solution 1 Drop(s) Both EYES two times a day  ascorbic acid 500 milliGRAM(s) Oral daily  ceFAZolin   IVPB 2000 milliGRAM(s) IV Intermittent every 8 hours  ceFAZolin   IVPB      chlorhexidine 2% Cloths 1 Application(s) Topical <User Schedule>  Dakins Solution - 1/4 Strength 1 Application(s) Topical every 12 hours  droxidopa 300 milliGRAM(s) Oral every 8 hours  folic acid 1 milliGRAM(s) Oral daily  heparin   Injectable 5000 Unit(s) SubCutaneous every 12 hours  insulin lispro (ADMELOG) corrective regimen sliding scale   SubCutaneous every 6 hours  levothyroxine Injectable 80 MICROGram(s) IV Push at bedtime  metroNIDAZOLE  IVPB 500 milliGRAM(s) IV Intermittent every 12 hours  midodrine 10 milliGRAM(s) Oral every 8 hours  multivitamin 1 Tablet(s) Oral daily  polyethylene glycol 3350 17 Gram(s) Oral daily  senna 2 Tablet(s) Oral at bedtime  sertraline 25 milliGRAM(s) Oral daily  sodium chloride 3%  Inhalation 4 milliLiter(s) Inhalation every 6 hours  tamoxifen 20 milliGRAM(s) Oral daily  thiamine 100 milliGRAM(s) Oral daily    MEDICATIONS  (PRN):      CAPILLARY BLOOD GLUCOSE      POCT Blood Glucose.: 167 mg/dL (16 Feb 2024 12:15)  POCT Blood Glucose.: 147 mg/dL (16 Feb 2024 09:42)  POCT Blood Glucose.: 176 mg/dL (16 Feb 2024 06:37)  POCT Blood Glucose.: 128 mg/dL (16 Feb 2024 00:54)  POCT Blood Glucose.: 122 mg/dL (15 Feb 2024 18:12)    I&O's Summary    15 Feb 2024 07:01  -  16 Feb 2024 07:00  --------------------------------------------------------  IN: 1985 mL / OUT: 1150 mL / NET: 835 mL    16 Feb 2024 07:01  -  16 Feb 2024 17:08  --------------------------------------------------------  IN: 0 mL / OUT: 750 mL / NET: -750 mL        PHYSICAL EXAM:  Vital Signs Last 24 Hrs  T(C): 36.8 (16 Feb 2024 17:00), Max: 37.1 (16 Feb 2024 05:05)  T(F): 98.2 (16 Feb 2024 17:00), Max: 98.7 (16 Feb 2024 05:05)  HR: 92 (16 Feb 2024 17:00) (66 - 100)  BP: 105/70 (16 Feb 2024 17:00) (92/59 - 113/70)  BP(mean): --  RR: 18 (16 Feb 2024 17:00) (18 - 19)  SpO2: 97% (16 Feb 2024 17:00) (91% - 100%)    Parameters below as of 16 Feb 2024 17:00  Patient On (Oxygen Delivery Method): nasal cannula  O2 Flow (L/min): 4    CONSTITUTIONAL: ill appearing   RESPIRATORY: Normal respiratory effort; no wheezing  CARDIOVASCULAR: Regular rate and rhythm, normal S1 and S2, no murmur/rub/gallop; No lower extremity edema  ABDOMEN: Nontender to palpation, normoactive bowel sounds, no rebound/guarding, +PEG  MUSCLOSKELETAL: contracted   PSYCH: A+Ox0    LABS:                        9.0    8.07  )-----------( 127      ( 16 Feb 2024 09:05 )             30.5     02-16    138  |  103  |  16  ----------------------------<  139<H>  4.9   |  21<L>  |  <0.30<L>    Ca    8.5      16 Feb 2024 09:05  Phos  2.7     02-16  Mg     2.1     02-16            Urinalysis Basic - ( 16 Feb 2024 09:05 )    Color: x / Appearance: x / SG: x / pH: x  Gluc: 139 mg/dL / Ketone: x  / Bili: x / Urobili: x   Blood: x / Protein: x / Nitrite: x   Leuk Esterase: x / RBC: x / WBC x   Sq Epi: x / Non Sq Epi: x / Bacteria: x

## 2024-02-16 NOTE — PROGRESS NOTE ADULT - SUBJECTIVE AND OBJECTIVE BOX
Follow Up:  sepsis    Interval History/ROS: pt afebrile and WBC normal, now on NC  ROS:    Unobtainable because: non verbal    Allergies  Tapazole (Hives)        ANTIMICROBIALS:  ceFAZolin   IVPB 2000 every 8 hours  ceFAZolin   IVPB    metroNIDAZOLE  IVPB 500 every 12 hours      OTHER MEDS:  albuterol/ipratropium for Nebulization 3 milliLiter(s) Nebulizer every 6 hours  artificial  tears Solution 1 Drop(s) Both EYES two times a day  ascorbic acid 500 milliGRAM(s) Oral daily  chlorhexidine 2% Cloths 1 Application(s) Topical <User Schedule>  Dakins Solution - 1/4 Strength 1 Application(s) Topical every 12 hours  droxidopa 300 milliGRAM(s) Oral every 8 hours  folic acid 1 milliGRAM(s) Oral daily  heparin   Injectable 5000 Unit(s) SubCutaneous every 12 hours  insulin lispro (ADMELOG) corrective regimen sliding scale   SubCutaneous every 6 hours  levothyroxine Injectable 80 MICROGram(s) IV Push at bedtime  midodrine 10 milliGRAM(s) Oral every 8 hours  multivitamin 1 Tablet(s) Oral daily  polyethylene glycol 3350 17 Gram(s) Oral daily  senna 2 Tablet(s) Oral at bedtime  sertraline 25 milliGRAM(s) Oral daily  sodium chloride 3%  Inhalation 4 milliLiter(s) Inhalation every 6 hours  tamoxifen 20 milliGRAM(s) Oral daily  thiamine 100 milliGRAM(s) Oral daily      Vital Signs Last 24 Hrs  T(C): 36.8 (16 Feb 2024 13:53), Max: 37.1 (16 Feb 2024 05:05)  T(F): 98.3 (16 Feb 2024 13:53), Max: 98.7 (16 Feb 2024 05:05)  HR: 96 (16 Feb 2024 14:26) (66 - 100)  BP: 96/51 (16 Feb 2024 13:53) (92/59 - 113/70)  BP(mean): --  RR: 18 (16 Feb 2024 14:26) (18 - 19)  SpO2: 96% (16 Feb 2024 14:26) (91% - 100%)    Parameters below as of 16 Feb 2024 14:26  Patient On (Oxygen Delivery Method): nasal cannula  O2 Flow (L/min): 4      Physical Exam:  General:    cachectic  Respiratory:  comfortable on NC  abd:   soft, PEG   :   no avila  Musculoskeletal : contracted, no joint swelling, no edema  Skin: multiple wounds including sacral, b/l hip, R upper buttock, L shoulder, thoracic back  vascular: R brachial midline                          9.0    8.07  )-----------( 127      ( 16 Feb 2024 09:05 )             30.5       02-16    138  |  103  |  16  ----------------------------<  139<H>  4.9   |  21<L>  |  <0.30<L>    Ca    8.5      16 Feb 2024 09:05  Phos  2.7     02-16  Mg     2.1     02-16        Urinalysis Basic - ( 16 Feb 2024 09:05 )    Color: x / Appearance: x / SG: x / pH: x  Gluc: 139 mg/dL / Ketone: x  / Bili: x / Urobili: x   Blood: x / Protein: x / Nitrite: x   Leuk Esterase: x / RBC: x / WBC x   Sq Epi: x / Non Sq Epi: x / Bacteria: x        MICROBIOLOGY:  v  .Genital Vaginal  02-03-24   Normal genital forrest isolated  --  --      .Blood Blood-Peripheral  02-02-24   No growth at 5 days  --  --      .Blood Blood-Peripheral  02-02-24   No growth at 5 days  --  --      .Blood Blood-Peripheral  02-02-24   No growth at 5 days  --  --      .Blood Blood-Peripheral  02-01-24   No growth at 5 days  --  --      Clean Catch Clean Catch (Midstream)  01-30-24   <10,000 CFU/mL Normal Urogenital Forrest  --  --      .Blood Blood-Peripheral  01-30-24   Growth in aerobic and anaerobic bottles: Staphylococcus aureus  Direct identification is available within approximately 3-5  hours either by Blood Panel Multiplexed PCR or Direct  MALDI-TOF. Details: [email or web address]  --  Blood Culture PCR  Staphylococcus aureus      .Blood Blood-Peripheral  01-30-24   No growth at 5 days  --  --      Catheterized Catheterized  01-18-24   <10,000 CFU/mL Normal Urogenital Forrest  --  --                RADIOLOGY:  Images independently visualized and reviewed personally, findings as below  < from: Xray Chest 1 View- PORTABLE-Urgent (Xray Chest 1 View- PORTABLE-Urgent .) (02.13.24 @ 06:55) >  IMPRESSION:  Interval decrease in right-sided pleural effusion.  Small left pleural effusion and atelectasis, not significantly changed.      < end of copied text >  < from: MR Lumbar Spine w/ IV Cont (02.01.24 @ 08:56) >    IMPRESSION:  Prominent sacral decubitus ulcer extending to the coccygeal tip without   evidence of underlying osteomyelitis or discrete fluid collections in the   surrounding soft tissues.    Sinus tract is seen extending from the skin surface to the L3 spinous   process which may represent additional ulcer. Recommend correlation with   clinical aspects of the case. No evidence of osteomyelitis in the   underlying L3 spinous process.    Multilevel chronic compression deformities and lumbar spondylosis as   described.    < end of copied text >  < from: TTE Limited W or WO Ultrasound Enhancing Agent (02.03.24 @ 13:20) >  CONCLUSIONS:      1. Non-diagnostic image quality.   2. Unable to evaluate left ventricular ejection fraction.      < end of copied text >

## 2024-02-17 LAB
ANION GAP SERPL CALC-SCNC: 10 MMOL/L — SIGNIFICANT CHANGE UP (ref 5–17)
BUN SERPL-MCNC: 18 MG/DL — SIGNIFICANT CHANGE UP (ref 7–23)
CALCIUM SERPL-MCNC: 8.5 MG/DL — SIGNIFICANT CHANGE UP (ref 8.4–10.5)
CHLORIDE SERPL-SCNC: 104 MMOL/L — SIGNIFICANT CHANGE UP (ref 96–108)
CO2 SERPL-SCNC: 26 MMOL/L — SIGNIFICANT CHANGE UP (ref 22–31)
CREAT SERPL-MCNC: <0.3 MG/DL — LOW (ref 0.5–1.3)
EGFR: 114 ML/MIN/1.73M2 — SIGNIFICANT CHANGE UP
GLUCOSE BLDC GLUCOMTR-MCNC: 127 MG/DL — HIGH (ref 70–99)
GLUCOSE BLDC GLUCOMTR-MCNC: 145 MG/DL — HIGH (ref 70–99)
GLUCOSE BLDC GLUCOMTR-MCNC: 151 MG/DL — HIGH (ref 70–99)
GLUCOSE BLDC GLUCOMTR-MCNC: 158 MG/DL — HIGH (ref 70–99)
GLUCOSE SERPL-MCNC: 146 MG/DL — HIGH (ref 70–99)
HCT VFR BLD CALC: 33.8 % — LOW (ref 34.5–45)
HGB BLD-MCNC: 9.7 G/DL — LOW (ref 11.5–15.5)
MCHC RBC-ENTMCNC: 27 PG — SIGNIFICANT CHANGE UP (ref 27–34)
MCHC RBC-ENTMCNC: 28.7 GM/DL — LOW (ref 32–36)
MCV RBC AUTO: 94.2 FL — SIGNIFICANT CHANGE UP (ref 80–100)
NRBC # BLD: 0 /100 WBCS — SIGNIFICANT CHANGE UP (ref 0–0)
PLATELET # BLD AUTO: 189 K/UL — SIGNIFICANT CHANGE UP (ref 150–400)
POTASSIUM SERPL-MCNC: 4.6 MMOL/L — SIGNIFICANT CHANGE UP (ref 3.5–5.3)
POTASSIUM SERPL-SCNC: 4.6 MMOL/L — SIGNIFICANT CHANGE UP (ref 3.5–5.3)
RBC # BLD: 3.59 M/UL — LOW (ref 3.8–5.2)
RBC # FLD: 18.9 % — HIGH (ref 10.3–14.5)
SODIUM SERPL-SCNC: 140 MMOL/L — SIGNIFICANT CHANGE UP (ref 135–145)
WBC # BLD: 6.81 K/UL — SIGNIFICANT CHANGE UP (ref 3.8–10.5)
WBC # FLD AUTO: 6.81 K/UL — SIGNIFICANT CHANGE UP (ref 3.8–10.5)

## 2024-02-17 PROCEDURE — 99232 SBSQ HOSP IP/OBS MODERATE 35: CPT

## 2024-02-17 RX ADMIN — TAMOXIFEN CITRATE 20 MILLIGRAM(S): 20 TABLET, FILM COATED ORAL at 12:07

## 2024-02-17 RX ADMIN — DROXIDOPA 300 MILLIGRAM(S): 100 CAPSULE ORAL at 05:28

## 2024-02-17 RX ADMIN — POLYETHYLENE GLYCOL 3350 17 GRAM(S): 17 POWDER, FOR SOLUTION ORAL at 12:08

## 2024-02-17 RX ADMIN — MIDODRINE HYDROCHLORIDE 10 MILLIGRAM(S): 2.5 TABLET ORAL at 14:41

## 2024-02-17 RX ADMIN — SODIUM CHLORIDE 4 MILLILITER(S): 9 INJECTION INTRAMUSCULAR; INTRAVENOUS; SUBCUTANEOUS at 12:08

## 2024-02-17 RX ADMIN — Medication 100 MILLIGRAM(S): at 06:19

## 2024-02-17 RX ADMIN — MIDODRINE HYDROCHLORIDE 10 MILLIGRAM(S): 2.5 TABLET ORAL at 05:29

## 2024-02-17 RX ADMIN — Medication 1 MILLIGRAM(S): at 12:08

## 2024-02-17 RX ADMIN — Medication 1 APPLICATION(S): at 18:44

## 2024-02-17 RX ADMIN — Medication 100 MILLIGRAM(S): at 18:43

## 2024-02-17 RX ADMIN — Medication 3 MILLILITER(S): at 18:43

## 2024-02-17 RX ADMIN — CHLORHEXIDINE GLUCONATE 1 APPLICATION(S): 213 SOLUTION TOPICAL at 05:29

## 2024-02-17 RX ADMIN — Medication 500 MILLIGRAM(S): at 12:07

## 2024-02-17 RX ADMIN — Medication 100 MILLIGRAM(S): at 12:08

## 2024-02-17 RX ADMIN — Medication 100 MILLIGRAM(S): at 05:28

## 2024-02-17 RX ADMIN — Medication 3 MILLILITER(S): at 12:08

## 2024-02-17 RX ADMIN — Medication 80 MICROGRAM(S): at 22:25

## 2024-02-17 RX ADMIN — SERTRALINE 25 MILLIGRAM(S): 25 TABLET, FILM COATED ORAL at 12:08

## 2024-02-17 RX ADMIN — SODIUM CHLORIDE 4 MILLILITER(S): 9 INJECTION INTRAMUSCULAR; INTRAVENOUS; SUBCUTANEOUS at 05:28

## 2024-02-17 RX ADMIN — Medication 3 MILLILITER(S): at 01:22

## 2024-02-17 RX ADMIN — Medication 3 MILLILITER(S): at 05:28

## 2024-02-17 RX ADMIN — HEPARIN SODIUM 5000 UNIT(S): 5000 INJECTION INTRAVENOUS; SUBCUTANEOUS at 18:43

## 2024-02-17 RX ADMIN — Medication 1 APPLICATION(S): at 05:29

## 2024-02-17 RX ADMIN — SENNA PLUS 2 TABLET(S): 8.6 TABLET ORAL at 22:25

## 2024-02-17 RX ADMIN — DROXIDOPA 300 MILLIGRAM(S): 100 CAPSULE ORAL at 22:25

## 2024-02-17 RX ADMIN — Medication 1 TABLET(S): at 12:07

## 2024-02-17 RX ADMIN — HEPARIN SODIUM 5000 UNIT(S): 5000 INJECTION INTRAVENOUS; SUBCUTANEOUS at 05:29

## 2024-02-17 RX ADMIN — Medication 1: at 18:43

## 2024-02-17 RX ADMIN — Medication 1 DROP(S): at 12:07

## 2024-02-17 RX ADMIN — SODIUM CHLORIDE 4 MILLILITER(S): 9 INJECTION INTRAMUSCULAR; INTRAVENOUS; SUBCUTANEOUS at 18:52

## 2024-02-17 RX ADMIN — Medication 1: at 06:42

## 2024-02-17 RX ADMIN — Medication 1 DROP(S): at 22:25

## 2024-02-17 RX ADMIN — SODIUM CHLORIDE 4 MILLILITER(S): 9 INJECTION INTRAMUSCULAR; INTRAVENOUS; SUBCUTANEOUS at 01:21

## 2024-02-17 RX ADMIN — MIDODRINE HYDROCHLORIDE 10 MILLIGRAM(S): 2.5 TABLET ORAL at 22:25

## 2024-02-17 RX ADMIN — Medication 100 MILLIGRAM(S): at 22:25

## 2024-02-17 RX ADMIN — DROXIDOPA 300 MILLIGRAM(S): 100 CAPSULE ORAL at 14:41

## 2024-02-17 RX ADMIN — Medication 100 MILLIGRAM(S): at 14:46

## 2024-02-17 NOTE — PROGRESS NOTE ADULT - ASSESSMENT
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 Admitted for septic shock likely 2/2 to wound infection, complicated by bacteremia. Course is now complicated by AHRF requiring HFNC. Now weaned off to NC

## 2024-02-17 NOTE — PROGRESS NOTE ADULT - SUBJECTIVE AND OBJECTIVE BOX
DATE OF SERVICE: 02-17-24 @ 11:22    Patient is a 68y old  Female who presents with a chief complaint of Sepsis 2/2 osteomyelitis (16 Feb 2024 17:48)      INTERVAL HISTORY:     REVIEW OF SYSTEMS:  CONSTITUTIONAL: No weakness  EYES/ENT: No visual changes;  No throat pain   NECK: No pain or stiffness  RESPIRATORY: No cough, wheezing; No shortness of breath  CARDIOVASCULAR: No chest pain or palpitations  GASTROINTESTINAL: No abdominal  pain. No nausea, vomiting, or hematemesis  GENITOURINARY: No dysuria, frequency or hematuria  NEUROLOGICAL: No stroke like symptoms  SKIN: No rashes    TELEMETRY Personally reviewed: ACC Junctional Frequent PAT longest 18.6 seconds @ 170s   	  MEDICATIONS:  droxidopa 300 milliGRAM(s) Oral every 8 hours  midodrine 10 milliGRAM(s) Oral every 8 hours        PHYSICAL EXAM:  T(C): 36.6 (02-17-24 @ 09:07), Max: 37.9 (02-17-24 @ 05:10)  HR: 91 (02-17-24 @ 09:07) (85 - 100)  BP: 93/62 (02-17-24 @ 09:07) (92/60 - 105/70)  RR: 18 (02-17-24 @ 09:07) (18 - 18)  SpO2: 100% (02-17-24 @ 09:07) (96% - 100%)  Wt(kg): --  I&O's Summary    16 Feb 2024 07:01  -  17 Feb 2024 07:00  --------------------------------------------------------  IN: 2060 mL / OUT: 1400 mL / NET: 660 mL    17 Feb 2024 07:01  -  17 Feb 2024 11:22  --------------------------------------------------------  IN: 0 mL / OUT: 0 mL / NET: 0 mL          Appearance: In no distress	  HEENT:    PERRL, EOMI	  Cardiovascular:  S1 S2, No JVD  Respiratory: Lungs clear to auscultation	  Gastrointestinal:  Soft, Non-tender, + BS	  Vascularature:  No edema of LE  Psychiatric: Appropriate affect   Neuro: no acute focal deficits                               9.7    6.81  )-----------( 189      ( 17 Feb 2024 07:59 )             33.8     02-17    140  |  104  |  18  ----------------------------<  146<H>  4.6   |  26  |  <0.30<L>    Ca    8.5      17 Feb 2024 07:59  Phos  2.7     02-16  Mg     2.1     02-16          Labs personally reviewed      ASSESSMENT/PLAN: 	  69 yo F with hx TBI (nonverbal, bedbound/contracted at baseline) s/p PEG last admission due to recurrent hypernatremia and chronic malnutrition, CVA, hypothyroidism, L breast cancer (s/p mastectomy tissue expander still in place ) who presented for decreased responsiveness and hypotension, found to have sepsis with MSSA bacteremia, likely source sacral ulcer w/ c/f possible osteomyelitis. Subsequently required ICU care given hypotension 2/2 likely septic shock, requiring pressors.     1. Problem/Plan:  Problem: Hypotension   Septic Shock 2/2 Bacteremia with MSSA.   - Previously in MICU requiring pressors, now off levo   -c/w Midodrine 10mg PO q8h  - c/w droxidopa - decreased to 300 mg q8h    2. Problem/Plan:  Problem: Hypothyroidism.   - Continue levothyroxine  - TSH wnl.    3. Problem/Plan:  Problem: DVT ppx  - Continue subq Heparin              Lloyd Ruiz, DAVIDA Quinones, DO [facility]  Cardiovascular Medicine  [address] [facility], [address]  Available through call or text on [employer]  Office: [phone]   DATE OF SERVICE: 02-17-24 @ 11:22    Patient is a 68y old  Female who presents with a chief complaint of Sepsis 2/2 osteomyelitis (16 Feb 2024 17:48)      INTERVAL HISTORY:     REVIEW OF SYSTEMS:  CONSTITUTIONAL: No weakness  EYES/ENT: No visual changes;  No throat pain   NECK: No pain or stiffness  RESPIRATORY: No cough, wheezing; No shortness of breath  CARDIOVASCULAR: No chest pain or palpitations  GASTROINTESTINAL: No abdominal  pain. No nausea, vomiting, or hematemesis  GENITOURINARY: No dysuria, frequency or hematuria  NEUROLOGICAL: No stroke like symptoms  SKIN: No rashes    TELEMETRY Personally reviewed: ACC Junctional SR   	  MEDICATIONS:  droxidopa 300 milliGRAM(s) Oral every 8 hours  midodrine 10 milliGRAM(s) Oral every 8 hours        PHYSICAL EXAM:  T(C): 36.6 (02-17-24 @ 09:07), Max: 37.9 (02-17-24 @ 05:10)  HR: 91 (02-17-24 @ 09:07) (85 - 100)  BP: 93/62 (02-17-24 @ 09:07) (92/60 - 105/70)  RR: 18 (02-17-24 @ 09:07) (18 - 18)  SpO2: 100% (02-17-24 @ 09:07) (96% - 100%)  Wt(kg): --  I&O's Summary    16 Feb 2024 07:01  -  17 Feb 2024 07:00  --------------------------------------------------------  IN: 2060 mL / OUT: 1400 mL / NET: 660 mL    17 Feb 2024 07:01  -  17 Feb 2024 11:22  --------------------------------------------------------  IN: 0 mL / OUT: 0 mL / NET: 0 mL          Appearance: In no distress	  HEENT:    PERRL, EOMI	  Cardiovascular:  S1 S2, No JVD  Respiratory: Lungs clear to auscultation	  Gastrointestinal:  Soft, Non-tender, + BS	  Vascularature:  No edema of LE  Psychiatric: Appropriate affect   Neuro: no acute focal deficits                               9.7    6.81  )-----------( 189      ( 17 Feb 2024 07:59 )             33.8     02-17    140  |  104  |  18  ----------------------------<  146<H>  4.6   |  26  |  <0.30<L>    Ca    8.5      17 Feb 2024 07:59  Phos  2.7     02-16  Mg     2.1     02-16          Labs personally reviewed      ASSESSMENT/PLAN: 	  69 yo F with hx TBI (nonverbal, bedbound/contracted at baseline) s/p PEG last admission due to recurrent hypernatremia and chronic malnutrition, CVA, hypothyroidism, L breast cancer (s/p mastectomy tissue expander still in place ) who presented for decreased responsiveness and hypotension, found to have sepsis with MSSA bacteremia, likely source sacral ulcer w/ c/f possible osteomyelitis. Subsequently required ICU care given hypotension 2/2 likely septic shock, requiring pressors.     1. Problem/Plan:  Problem: Hypotension   Septic Shock 2/2 Bacteremia with MSSA.   - Previously in MICU requiring pressors, now off levo   -c/w Midodrine 10mg PO q8h  - c/w droxidopa - decreased to 300 mg q8h    2. Problem/Plan:  Problem: Hypothyroidism.   - Continue levothyroxine  - TSH wnl.    3. Problem/Plan:  Problem: DVT ppx  - Continue subq Heparin    \      DAVIDA Warren DO [facility]  Cardiovascular Medicine  [address] [facility], [address]  Available through call or text on [employer]  Office: [phone]   DATE OF SERVICE: 02-17-24 @ 11:22    Patient is a 68y old  Female who presents with a chief complaint of Sepsis 2/2 osteomyelitis (16 Feb 2024 17:48)      INTERVAL HISTORY:     REVIEW OF SYSTEMS:  CONSTITUTIONAL: No weakness  EYES/ENT: No visual changes;  No throat pain   NECK: No pain or stiffness  RESPIRATORY: No cough, wheezing; No shortness of breath  CARDIOVASCULAR: No chest pain or palpitations  GASTROINTESTINAL: No abdominal  pain. No nausea, vomiting, or hematemesis  GENITOURINARY: No dysuria, frequency or hematuria  NEUROLOGICAL: No stroke like symptoms  SKIN: No rashes    TELEMETRY Personally reviewed: ACC Junctional SR   	  MEDICATIONS:  droxidopa 300 milliGRAM(s) Oral every 8 hours  midodrine 10 milliGRAM(s) Oral every 8 hours        PHYSICAL EXAM:  T(C): 36.6 (02-17-24 @ 09:07), Max: 37.9 (02-17-24 @ 05:10)  HR: 91 (02-17-24 @ 09:07) (85 - 100)  BP: 93/62 (02-17-24 @ 09:07) (92/60 - 105/70)  RR: 18 (02-17-24 @ 09:07) (18 - 18)  SpO2: 100% (02-17-24 @ 09:07) (96% - 100%)  Wt(kg): --  I&O's Summary    16 Feb 2024 07:01  -  17 Feb 2024 07:00  --------------------------------------------------------  IN: 2060 mL / OUT: 1400 mL / NET: 660 mL    17 Feb 2024 07:01  -  17 Feb 2024 11:22  --------------------------------------------------------  IN: 0 mL / OUT: 0 mL / NET: 0 mL          Appearance: In no distress	  HEENT:    PERRL, EOMI	  Cardiovascular:  S1 S2, No JVD  Respiratory: Lungs clear to auscultation	  Gastrointestinal:  Soft, Non-tender, + BS	  Vascularature:  No edema of LE  Psychiatric: Appropriate affect   Neuro: no acute focal deficits                               9.7    6.81  )-----------( 189      ( 17 Feb 2024 07:59 )             33.8     02-17    140  |  104  |  18  ----------------------------<  146<H>  4.6   |  26  |  <0.30<L>    Ca    8.5      17 Feb 2024 07:59  Phos  2.7     02-16  Mg     2.1     02-16          Labs personally reviewed      ASSESSMENT/PLAN: 	  69 yo F with hx TBI (nonverbal, bedbound/contracted at baseline) s/p PEG last admission due to recurrent hypernatremia and chronic malnutrition, CVA, hypothyroidism, L breast cancer (s/p mastectomy tissue expander still in place ) who presented for decreased responsiveness and hypotension, found to have sepsis with MSSA bacteremia, likely source sacral ulcer w/ c/f possible osteomyelitis. Subsequently required ICU care given hypotension 2/2 likely septic shock, requiring pressors.     1. Problem/Plan:  Problem: Hypotension   Septic Shock 2/2 Bacteremia with MSSA.   - Previously in MICU requiring pressors, now off levo   -c/w Midodrine 10mg PO q8h  - c/w droxidopa - decreased to 300 mg q8h    2. Problem/Plan:  Problem: Hypothyroidism.   - Continue levothyroxine  - TSH wnl.    3. Problem/Plan:  Problem: DVT ppx  - Continue subq Heparin        DAVIDA Warren,  [facility]  Cardiovascular Medicine  [address], [address]  Available through call or text on [employer]  Office: [phone]

## 2024-02-17 NOTE — PROGRESS NOTE ADULT - SUBJECTIVE AND OBJECTIVE BOX
PROGRESS NOTE:   Authored by Dr. Ciera Phillips MD, Available on MS Teams    Patient is a 68y old  Female who presents with a chief complaint of Sepsis 2/2 osteomyelitis (17 Feb 2024 11:21)      SUBJECTIVE / OVERNIGHT EVENTS: No acute events overnight    ADDITIONAL REVIEW OF SYSTEMS:    MEDICATIONS  (STANDING):  albuterol/ipratropium for Nebulization 3 milliLiter(s) Nebulizer every 6 hours  artificial  tears Solution 1 Drop(s) Both EYES two times a day  ascorbic acid 500 milliGRAM(s) Oral daily  ceFAZolin   IVPB 2000 milliGRAM(s) IV Intermittent every 8 hours  ceFAZolin   IVPB      chlorhexidine 2% Cloths 1 Application(s) Topical <User Schedule>  Dakins Solution - 1/4 Strength 1 Application(s) Topical every 12 hours  droxidopa 300 milliGRAM(s) Oral every 8 hours  folic acid 1 milliGRAM(s) Oral daily  heparin   Injectable 5000 Unit(s) SubCutaneous every 12 hours  insulin lispro (ADMELOG) corrective regimen sliding scale   SubCutaneous every 6 hours  levothyroxine Injectable 80 MICROGram(s) IV Push at bedtime  metroNIDAZOLE  IVPB 500 milliGRAM(s) IV Intermittent every 12 hours  midodrine 10 milliGRAM(s) Oral every 8 hours  multivitamin 1 Tablet(s) Oral daily  polyethylene glycol 3350 17 Gram(s) Oral daily  senna 2 Tablet(s) Oral at bedtime  sertraline 25 milliGRAM(s) Oral daily  sodium chloride 3%  Inhalation 4 milliLiter(s) Inhalation every 6 hours  tamoxifen 20 milliGRAM(s) Oral daily  thiamine 100 milliGRAM(s) Oral daily    MEDICATIONS  (PRN):      CAPILLARY BLOOD GLUCOSE      POCT Blood Glucose.: 145 mg/dL (17 Feb 2024 12:07)  POCT Blood Glucose.: 158 mg/dL (17 Feb 2024 06:27)  POCT Blood Glucose.: 127 mg/dL (17 Feb 2024 01:11)  POCT Blood Glucose.: 152 mg/dL (16 Feb 2024 17:19)    I&O's Summary    16 Feb 2024 07:01  -  17 Feb 2024 07:00  --------------------------------------------------------  IN: 2060 mL / OUT: 1400 mL / NET: 660 mL    17 Feb 2024 07:01  -  17 Feb 2024 13:10  --------------------------------------------------------  IN: 0 mL / OUT: 0 mL / NET: 0 mL        PHYSICAL EXAM:  Vital Signs Last 24 Hrs  T(C): 36.6 (17 Feb 2024 09:07), Max: 37.9 (17 Feb 2024 05:10)  T(F): 97.8 (17 Feb 2024 09:07), Max: 100.2 (17 Feb 2024 05:10)  HR: 91 (17 Feb 2024 09:07) (85 - 100)  BP: 93/62 (17 Feb 2024 09:07) (92/60 - 105/70)  BP(mean): --  RR: 18 (17 Feb 2024 09:07) (18 - 18)  SpO2: 100% (17 Feb 2024 09:07) (96% - 100%)    Parameters below as of 17 Feb 2024 09:07  Patient On (Oxygen Delivery Method): nasal cannula  O2 Flow (L/min): 4      CONSTITUTIONAL: NAD  RESPIRATORY: Normal respiratory effort; lungs are clear to auscultation bilaterally  CARDIOVASCULAR: Regular rate and rhythm, normal S1 and S2, no murmur/rub/gallop; No lower extremity edema  ABDOMEN: Nontender to palpation, normoactive bowel sounds, no rebound/guarding  MUSCLOSKELETAL: contracted  PSYCH: A+Ox0, opens eyes to tactile stimuli    LABS:                        9.7    6.81  )-----------( 189      ( 17 Feb 2024 07:59 )             33.8     02-17    140  |  104  |  18  ----------------------------<  146<H>  4.6   |  26  |  <0.30<L>    Ca    8.5      17 Feb 2024 07:59  Phos  2.7     02-16  Mg     2.1     02-16            Urinalysis Basic - ( 17 Feb 2024 07:59 )    Color: x / Appearance: x / SG: x / pH: x  Gluc: 146 mg/dL / Ketone: x  / Bili: x / Urobili: x   Blood: x / Protein: x / Nitrite: x   Leuk Esterase: x / RBC: x / WBC x   Sq Epi: x / Non Sq Epi: x / Bacteria: x

## 2024-02-18 LAB
GLUCOSE BLDC GLUCOMTR-MCNC: 143 MG/DL — HIGH (ref 70–99)
GLUCOSE BLDC GLUCOMTR-MCNC: 149 MG/DL — HIGH (ref 70–99)
GLUCOSE BLDC GLUCOMTR-MCNC: 166 MG/DL — HIGH (ref 70–99)
GLUCOSE BLDC GLUCOMTR-MCNC: 177 MG/DL — HIGH (ref 70–99)

## 2024-02-18 PROCEDURE — 99232 SBSQ HOSP IP/OBS MODERATE 35: CPT

## 2024-02-18 RX ADMIN — Medication 1 APPLICATION(S): at 17:34

## 2024-02-18 RX ADMIN — Medication 3 MILLILITER(S): at 06:27

## 2024-02-18 RX ADMIN — Medication 1: at 06:32

## 2024-02-18 RX ADMIN — SODIUM CHLORIDE 4 MILLILITER(S): 9 INJECTION INTRAMUSCULAR; INTRAVENOUS; SUBCUTANEOUS at 12:26

## 2024-02-18 RX ADMIN — DROXIDOPA 300 MILLIGRAM(S): 100 CAPSULE ORAL at 22:23

## 2024-02-18 RX ADMIN — CHLORHEXIDINE GLUCONATE 1 APPLICATION(S): 213 SOLUTION TOPICAL at 11:09

## 2024-02-18 RX ADMIN — Medication 1 APPLICATION(S): at 06:13

## 2024-02-18 RX ADMIN — DROXIDOPA 300 MILLIGRAM(S): 100 CAPSULE ORAL at 06:12

## 2024-02-18 RX ADMIN — Medication 100 MILLIGRAM(S): at 06:12

## 2024-02-18 RX ADMIN — SODIUM CHLORIDE 4 MILLILITER(S): 9 INJECTION INTRAMUSCULAR; INTRAVENOUS; SUBCUTANEOUS at 17:34

## 2024-02-18 RX ADMIN — Medication 100 MILLIGRAM(S): at 22:23

## 2024-02-18 RX ADMIN — MIDODRINE HYDROCHLORIDE 10 MILLIGRAM(S): 2.5 TABLET ORAL at 13:07

## 2024-02-18 RX ADMIN — TAMOXIFEN CITRATE 20 MILLIGRAM(S): 20 TABLET, FILM COATED ORAL at 13:07

## 2024-02-18 RX ADMIN — Medication 1: at 13:06

## 2024-02-18 RX ADMIN — HEPARIN SODIUM 5000 UNIT(S): 5000 INJECTION INTRAVENOUS; SUBCUTANEOUS at 06:12

## 2024-02-18 RX ADMIN — Medication 3 MILLILITER(S): at 01:07

## 2024-02-18 RX ADMIN — Medication 1 MILLIGRAM(S): at 13:07

## 2024-02-18 RX ADMIN — MIDODRINE HYDROCHLORIDE 10 MILLIGRAM(S): 2.5 TABLET ORAL at 06:12

## 2024-02-18 RX ADMIN — SODIUM CHLORIDE 4 MILLILITER(S): 9 INJECTION INTRAMUSCULAR; INTRAVENOUS; SUBCUTANEOUS at 01:06

## 2024-02-18 RX ADMIN — Medication 100 MILLIGRAM(S): at 13:07

## 2024-02-18 RX ADMIN — Medication 1 TABLET(S): at 13:07

## 2024-02-18 RX ADMIN — HEPARIN SODIUM 5000 UNIT(S): 5000 INJECTION INTRAVENOUS; SUBCUTANEOUS at 17:34

## 2024-02-18 RX ADMIN — SERTRALINE 25 MILLIGRAM(S): 25 TABLET, FILM COATED ORAL at 13:08

## 2024-02-18 RX ADMIN — Medication 1 DROP(S): at 11:09

## 2024-02-18 RX ADMIN — Medication 3 MILLILITER(S): at 17:33

## 2024-02-18 RX ADMIN — SODIUM CHLORIDE 4 MILLILITER(S): 9 INJECTION INTRAMUSCULAR; INTRAVENOUS; SUBCUTANEOUS at 06:27

## 2024-02-18 RX ADMIN — POLYETHYLENE GLYCOL 3350 17 GRAM(S): 17 POWDER, FOR SOLUTION ORAL at 13:08

## 2024-02-18 RX ADMIN — Medication 100 MILLIGRAM(S): at 17:32

## 2024-02-18 RX ADMIN — DROXIDOPA 300 MILLIGRAM(S): 100 CAPSULE ORAL at 13:07

## 2024-02-18 RX ADMIN — Medication 3 MILLILITER(S): at 12:26

## 2024-02-18 RX ADMIN — Medication 100 MILLIGRAM(S): at 13:08

## 2024-02-18 RX ADMIN — MIDODRINE HYDROCHLORIDE 10 MILLIGRAM(S): 2.5 TABLET ORAL at 22:23

## 2024-02-18 RX ADMIN — Medication 500 MILLIGRAM(S): at 13:07

## 2024-02-18 RX ADMIN — Medication 80 MICROGRAM(S): at 22:24

## 2024-02-18 RX ADMIN — SENNA PLUS 2 TABLET(S): 8.6 TABLET ORAL at 22:24

## 2024-02-18 RX ADMIN — Medication 1 DROP(S): at 22:24

## 2024-02-18 NOTE — PROGRESS NOTE ADULT - SUBJECTIVE AND OBJECTIVE BOX
PROGRESS NOTE:   Authored by Dr. Ciera Phillips MD, Available on MS Teams    Patient is a 68y old  Female who presents with a chief complaint of Sepsis 2/2 osteomyelitis (18 Feb 2024 10:32)      SUBJECTIVE / OVERNIGHT EVENTS: No acute events overnight     ADDITIONAL REVIEW OF SYSTEMS:    MEDICATIONS  (STANDING):  albuterol/ipratropium for Nebulization 3 milliLiter(s) Nebulizer every 6 hours  artificial  tears Solution 1 Drop(s) Both EYES two times a day  ascorbic acid 500 milliGRAM(s) Oral daily  ceFAZolin   IVPB 2000 milliGRAM(s) IV Intermittent every 8 hours  ceFAZolin   IVPB      chlorhexidine 2% Cloths 1 Application(s) Topical <User Schedule>  Dakins Solution - 1/4 Strength 1 Application(s) Topical every 12 hours  droxidopa 300 milliGRAM(s) Oral every 8 hours  folic acid 1 milliGRAM(s) Oral daily  heparin   Injectable 5000 Unit(s) SubCutaneous every 12 hours  insulin lispro (ADMELOG) corrective regimen sliding scale   SubCutaneous every 6 hours  levothyroxine Injectable 80 MICROGram(s) IV Push at bedtime  metroNIDAZOLE  IVPB 500 milliGRAM(s) IV Intermittent every 12 hours  midodrine 10 milliGRAM(s) Oral every 8 hours  multivitamin 1 Tablet(s) Oral daily  polyethylene glycol 3350 17 Gram(s) Oral daily  senna 2 Tablet(s) Oral at bedtime  sertraline 25 milliGRAM(s) Oral daily  sodium chloride 3%  Inhalation 4 milliLiter(s) Inhalation every 6 hours  tamoxifen 20 milliGRAM(s) Oral daily  thiamine 100 milliGRAM(s) Oral daily    MEDICATIONS  (PRN):      CAPILLARY BLOOD GLUCOSE      POCT Blood Glucose.: 177 mg/dL (18 Feb 2024 13:05)  POCT Blood Glucose.: 166 mg/dL (18 Feb 2024 06:28)  POCT Blood Glucose.: 143 mg/dL (18 Feb 2024 00:00)  POCT Blood Glucose.: 151 mg/dL (17 Feb 2024 17:47)    I&O's Summary    17 Feb 2024 07:01  -  18 Feb 2024 07:00  --------------------------------------------------------  IN: 1530 mL / OUT: 950 mL / NET: 580 mL        PHYSICAL EXAM:  Vital Signs Last 24 Hrs  T(C): 36.5 (18 Feb 2024 05:35), Max: 37.4 (17 Feb 2024 21:00)  T(F): 97.7 (18 Feb 2024 05:35), Max: 99.4 (17 Feb 2024 21:00)  HR: 90 (18 Feb 2024 05:35) (90 - 100)  BP: 104/70 (18 Feb 2024 05:35) (97/67 - 117/79)  BP(mean): --  RR: 18 (18 Feb 2024 05:35) (18 - 18)  SpO2: 97% (18 Feb 2024 05:35) (93% - 100%)    Parameters below as of 18 Feb 2024 05:35  Patient On (Oxygen Delivery Method): room air        CONSTITUTIONAL: NAD, ill appearing   RESPIRATORY: Normal respiratory effort; lungs are clear to auscultation bilaterally  CARDIOVASCULAR: Regular rate and rhythm, normal S1 and S2, no murmur/rub/gallop; No lower extremity edema  ABDOMEN: Nontender to palpation, normoactive bowel sounds, no rebound/guarding  MUSCLOSKELETAL: contracted extremities   PSYCH: A+Ox0    LABS:                        9.7    6.81  )-----------( 189      ( 17 Feb 2024 07:59 )             33.8     02-17    140  |  104  |  18  ----------------------------<  146<H>  4.6   |  26  |  <0.30<L>    Ca    8.5      17 Feb 2024 07:59            Urinalysis Basic - ( 17 Feb 2024 07:59 )    Color: x / Appearance: x / SG: x / pH: x  Gluc: 146 mg/dL / Ketone: x  / Bili: x / Urobili: x   Blood: x / Protein: x / Nitrite: x   Leuk Esterase: x / RBC: x / WBC x   Sq Epi: x / Non Sq Epi: x / Bacteria: x

## 2024-02-18 NOTE — PROGRESS NOTE ADULT - SUBJECTIVE AND OBJECTIVE BOX
DATE OF SERVICE: 02-18-24 @ 10:32    Patient is a 68y old  Female who presents with a chief complaint of Sepsis 2/2 osteomyelitis (17 Feb 2024 13:10)      INTERVAL HISTORY:     REVIEW OF SYSTEMS:  CONSTITUTIONAL: No weakness  EYES/ENT: No visual changes;  No throat pain   NECK: No pain or stiffness  RESPIRATORY: No cough, wheezing; No shortness of breath  CARDIOVASCULAR: No chest pain or palpitations  GASTROINTESTINAL: No abdominal  pain. No nausea, vomiting, or hematemesis  GENITOURINARY: No dysuria, frequency or hematuria  NEUROLOGICAL: No stroke like symptoms  SKIN: No rashes    TELEMETRY Personally reviewed: ACC Junctional SR -  	  MEDICATIONS:  droxidopa 300 milliGRAM(s) Oral every 8 hours  midodrine 10 milliGRAM(s) Oral every 8 hours        PHYSICAL EXAM:  T(C): 36.5 (02-18-24 @ 05:35), Max: 37.4 (02-17-24 @ 21:00)  HR: 90 (02-18-24 @ 05:35) (90 - 100)  BP: 104/70 (02-18-24 @ 05:35) (93/66 - 117/79)  RR: 18 (02-18-24 @ 05:35) (18 - 18)  SpO2: 97% (02-18-24 @ 05:35) (93% - 100%)  Wt(kg): --  I&O's Summary    17 Feb 2024 07:01  -  18 Feb 2024 07:00  --------------------------------------------------------  IN: 1530 mL / OUT: 950 mL / NET: 580 mL          Appearance: In no distress	  HEENT:    PERRL, EOMI	  Cardiovascular:  S1 S2, No JVD  Respiratory: Lungs clear to auscultation	  Gastrointestinal:  Soft, Non-tender, + BS	  Vascularature:  No edema of LE  Psychiatric: Appropriate affect   Neuro: no acute focal deficits                               9.7    6.81  )-----------( 189      ( 17 Feb 2024 07:59 )             33.8     02-17    140  |  104  |  18  ----------------------------<  146<H>  4.6   |  26  |  <0.30<L>    Ca    8.5      17 Feb 2024 07:59          Labs personally reviewed      ASSESSMENT/PLAN: 	  67 yo F with hx TBI (nonverbal, bedbound/contracted at baseline) s/p PEG last admission due to recurrent hypernatremia and chronic malnutrition, CVA, hypothyroidism, L breast cancer (s/p mastectomy tissue expander still in place ) who presented for decreased responsiveness and hypotension, found to have sepsis with MSSA bacteremia, likely source sacral ulcer w/ c/f possible osteomyelitis. Subsequently required ICU care given hypotension 2/2 likely septic shock, requiring pressors.       1. Problem/Plan:  Problem: Hypotension   Septic Shock 2/2 Bacteremia with MSSA.   - Previously in MICU requiring pressors, now off levo   -c/w Midodrine 10mg PO q8h  - c/w droxidopa - decreased to 300 mg q8h    2. Problem/Plan:  Problem: Hypothyroidism.   - Continue levothyroxine  - TSH wnl.    3. Problem/Plan:  Problem: DVT ppx  - Continue subq Heparin            Lloyd Ruiz, DAVIDA Quinones DO [facility]  Cardiovascular Medicine  [address] [facility], [address]  Available through call or text on [employer] TE[employer]  Office: [phone]   DATE OF SERVICE: 02-18-24 @ 10:32    Patient is a 68y old  Female who presents with a chief complaint of Sepsis 2/2 osteomyelitis (17 Feb 2024 13:10)      INTERVAL HISTORY:     REVIEW OF SYSTEMS:  CONSTITUTIONAL: No weakness  EYES/ENT: No visual changes;  No throat pain   NECK: No pain or stiffness  RESPIRATORY: No cough, wheezing; No shortness of breath  CARDIOVASCULAR: No chest pain or palpitations  GASTROINTESTINAL: No abdominal  pain. No nausea, vomiting, or hematemesis  GENITOURINARY: No dysuria, frequency or hematuria  NEUROLOGICAL: No stroke like symptoms  SKIN: No rashes    TELEMETRY Personally reviewed: ACC Junctional SR -  	  MEDICATIONS:  droxidopa 300 milliGRAM(s) Oral every 8 hours  midodrine 10 milliGRAM(s) Oral every 8 hours        PHYSICAL EXAM:  T(C): 36.5 (02-18-24 @ 05:35), Max: 37.4 (02-17-24 @ 21:00)  HR: 90 (02-18-24 @ 05:35) (90 - 100)  BP: 104/70 (02-18-24 @ 05:35) (93/66 - 117/79)  RR: 18 (02-18-24 @ 05:35) (18 - 18)  SpO2: 97% (02-18-24 @ 05:35) (93% - 100%)  Wt(kg): --  I&O's Summary    17 Feb 2024 07:01  -  18 Feb 2024 07:00  --------------------------------------------------------  IN: 1530 mL / OUT: 950 mL / NET: 580 mL          Appearance: In no distress	  HEENT:    PERRL, EOMI	  Cardiovascular:  S1 S2, No JVD  Respiratory: Lungs clear to auscultation	  Gastrointestinal:  Soft, Non-tender, + BS	  Vascularature:  No edema of LE  Psychiatric: Appropriate affect   Neuro: no acute focal deficits                               9.7    6.81  )-----------( 189      ( 17 Feb 2024 07:59 )             33.8     02-17    140  |  104  |  18  ----------------------------<  146<H>  4.6   |  26  |  <0.30<L>    Ca    8.5      17 Feb 2024 07:59          Labs personally reviewed      ASSESSMENT/PLAN: 	  67 yo F with hx TBI (nonverbal, bedbound/contracted at baseline) s/p PEG last admission due to recurrent hypernatremia and chronic malnutrition, CVA, hypothyroidism, L breast cancer (s/p mastectomy tissue expander still in place ) who presented for decreased responsiveness and hypotension, found to have sepsis with MSSA bacteremia, likely source sacral ulcer w/ c/f possible osteomyelitis. Subsequently required ICU care given hypotension 2/2 likely septic shock, requiring pressors.       1. Problem/Plan:  Problem: Hypotension   Septic Shock 2/2 Bacteremia with MSSA.   - Previously in MICU requiring pressors, now off levo   -c/w Midodrine 10mg PO q8h  - c/w droxidopa - decreased to 300 mg q8h    2. Problem/Plan:  Problem: Hypothyroidism.   - Continue levothyroxine  - TSH wnl.    3. Problem/Plan:  Problem: DVT ppx  - Continue subq Heparin            Lloyd Ruiz, DAVIDA Quinones DO [facility]  Cardiovascular Medicine  [address] [facility], [address]  Available through call or text on [employer] TE[employer]  Office: [phone]

## 2024-02-18 NOTE — PROGRESS NOTE ADULT - ASSESSMENT
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 Admitted for septic shock likely 2/2 to wound infection, complicated by bacteremia. Course is now complicated by AHRF requiring HFNC, now weaned off

## 2024-02-19 LAB
GLUCOSE BLDC GLUCOMTR-MCNC: 133 MG/DL — HIGH (ref 70–99)
GLUCOSE BLDC GLUCOMTR-MCNC: 144 MG/DL — HIGH (ref 70–99)
GLUCOSE BLDC GLUCOMTR-MCNC: 151 MG/DL — HIGH (ref 70–99)
GLUCOSE BLDC GLUCOMTR-MCNC: 172 MG/DL — HIGH (ref 70–99)

## 2024-02-19 PROCEDURE — 99232 SBSQ HOSP IP/OBS MODERATE 35: CPT

## 2024-02-19 RX ADMIN — HEPARIN SODIUM 5000 UNIT(S): 5000 INJECTION INTRAVENOUS; SUBCUTANEOUS at 18:37

## 2024-02-19 RX ADMIN — Medication 3 MILLILITER(S): at 11:27

## 2024-02-19 RX ADMIN — Medication 3 MILLILITER(S): at 18:36

## 2024-02-19 RX ADMIN — DROXIDOPA 300 MILLIGRAM(S): 100 CAPSULE ORAL at 05:40

## 2024-02-19 RX ADMIN — Medication 1 TABLET(S): at 11:27

## 2024-02-19 RX ADMIN — SODIUM CHLORIDE 4 MILLILITER(S): 9 INJECTION INTRAMUSCULAR; INTRAVENOUS; SUBCUTANEOUS at 18:37

## 2024-02-19 RX ADMIN — Medication 100 MILLIGRAM(S): at 18:37

## 2024-02-19 RX ADMIN — TAMOXIFEN CITRATE 20 MILLIGRAM(S): 20 TABLET, FILM COATED ORAL at 11:27

## 2024-02-19 RX ADMIN — Medication 500 MILLIGRAM(S): at 11:27

## 2024-02-19 RX ADMIN — Medication 100 MILLIGRAM(S): at 13:19

## 2024-02-19 RX ADMIN — Medication 100 MILLIGRAM(S): at 05:44

## 2024-02-19 RX ADMIN — Medication 1: at 12:21

## 2024-02-19 RX ADMIN — Medication 3 MILLILITER(S): at 05:40

## 2024-02-19 RX ADMIN — SODIUM CHLORIDE 4 MILLILITER(S): 9 INJECTION INTRAMUSCULAR; INTRAVENOUS; SUBCUTANEOUS at 00:28

## 2024-02-19 RX ADMIN — SERTRALINE 25 MILLIGRAM(S): 25 TABLET, FILM COATED ORAL at 11:27

## 2024-02-19 RX ADMIN — MIDODRINE HYDROCHLORIDE 10 MILLIGRAM(S): 2.5 TABLET ORAL at 13:19

## 2024-02-19 RX ADMIN — Medication 80 MICROGRAM(S): at 21:10

## 2024-02-19 RX ADMIN — HEPARIN SODIUM 5000 UNIT(S): 5000 INJECTION INTRAVENOUS; SUBCUTANEOUS at 05:40

## 2024-02-19 RX ADMIN — MIDODRINE HYDROCHLORIDE 10 MILLIGRAM(S): 2.5 TABLET ORAL at 21:10

## 2024-02-19 RX ADMIN — Medication 1 DROP(S): at 09:10

## 2024-02-19 RX ADMIN — Medication 1 APPLICATION(S): at 05:43

## 2024-02-19 RX ADMIN — POLYETHYLENE GLYCOL 3350 17 GRAM(S): 17 POWDER, FOR SOLUTION ORAL at 11:27

## 2024-02-19 RX ADMIN — Medication 100 MILLIGRAM(S): at 05:06

## 2024-02-19 RX ADMIN — Medication 3 MILLILITER(S): at 00:28

## 2024-02-19 RX ADMIN — SENNA PLUS 2 TABLET(S): 8.6 TABLET ORAL at 21:10

## 2024-02-19 RX ADMIN — Medication 1 APPLICATION(S): at 18:37

## 2024-02-19 RX ADMIN — SODIUM CHLORIDE 4 MILLILITER(S): 9 INJECTION INTRAMUSCULAR; INTRAVENOUS; SUBCUTANEOUS at 05:40

## 2024-02-19 RX ADMIN — DROXIDOPA 300 MILLIGRAM(S): 100 CAPSULE ORAL at 13:19

## 2024-02-19 RX ADMIN — Medication 100 MILLIGRAM(S): at 11:28

## 2024-02-19 RX ADMIN — MIDODRINE HYDROCHLORIDE 10 MILLIGRAM(S): 2.5 TABLET ORAL at 05:40

## 2024-02-19 RX ADMIN — Medication 1 MILLIGRAM(S): at 11:27

## 2024-02-19 RX ADMIN — Medication 1 DROP(S): at 21:10

## 2024-02-19 RX ADMIN — SODIUM CHLORIDE 4 MILLILITER(S): 9 INJECTION INTRAMUSCULAR; INTRAVENOUS; SUBCUTANEOUS at 11:28

## 2024-02-19 RX ADMIN — Medication 1: at 06:11

## 2024-02-19 RX ADMIN — Medication 100 MILLIGRAM(S): at 21:10

## 2024-02-19 RX ADMIN — DROXIDOPA 300 MILLIGRAM(S): 100 CAPSULE ORAL at 21:10

## 2024-02-19 RX ADMIN — CHLORHEXIDINE GLUCONATE 1 APPLICATION(S): 213 SOLUTION TOPICAL at 05:06

## 2024-02-19 NOTE — PROGRESS NOTE ADULT - PROBLEM SELECTOR PROBLEM 5
Breast cancer
Hypothyroidism
Breast cancer
Constipation
Constipation
Breast cancer
Breast cancer
Encounter for palliative care
Breast cancer
QT prolongation
Breast cancer
Hypothyroidism
QT prolongation

## 2024-02-19 NOTE — PROGRESS NOTE ADULT - PROBLEM SELECTOR PROBLEM 2
Osteomyelitis of vertebra, sacral and sacrococcygeal region
Elevated LFTs
Osteomyelitis of vertebra, sacral and sacrococcygeal region

## 2024-02-19 NOTE — PROGRESS NOTE ADULT - NUTRITIONAL ASSESSMENT
This patient has been assessed with a concern for Malnutrition and has been determined to have a diagnosis/diagnoses of Severe protein-calorie malnutrition and Underweight (BMI < 19).    This patient is being managed with:   Diet NPO with Tube Feed-  Tube Feeding Modality: Gastrostomy  Glucerna 1.5 Vidal (GLUCERNA1.5RTH)  Total Volume for 24 Hours (mL): 990  Continuous  Starting Tube Feed Rate {mL per Hour}: 10  Increase Tube Feed Rate by (mL): 10     Every 4 hours  Until Goal Tube Feed Rate (mL per Hour): 55  Tube Feed Duration (in Hours): 18  Tube Feed Start Time: 07:30   Frequency: Every 4 Hours  Entered: Feb 4 2024  9:21PM  
This patient has been assessed with a concern for Malnutrition and has been determined to have a diagnosis/diagnoses of Severe protein-calorie malnutrition and Underweight (BMI < 19).    This patient is being managed with:   Diet NPO with Tube Feed-  Tube Feeding Modality: Gastrostomy  Glucerna 1.5 Vidal (GLUCERNA1.5RTH)  Total Volume for 24 Hours (mL): 900  Continuous  Starting Tube Feed Rate {mL per Hour}: 10  Increase Tube Feed Rate by (mL): 10     Every 4 hours  Until Goal Tube Feed Rate (mL per Hour): 50  Tube Feed Duration (in Hours): 18  Tube Feed Start Time: 07:30  Free Water Flush   Total Volume per Flush (mL): 60   Frequency: Every 4 Hours   Total Daily Volume of Flush (mL): 360  Carlos(7 Gm Arginine/7 Gm Glut/1.2 Gm HMB     Qty per Day:  2  Liquid Protein Supplement     Qty per Day:  1  Entered: Feb 1 2024  3:17PM  
This patient has been assessed with a concern for Malnutrition and has been determined to have a diagnosis/diagnoses of Severe protein-calorie malnutrition and Underweight (BMI < 19).    This patient is being managed with:   Diet NPO with Tube Feed-  Tube Feeding Modality: Gastrostomy  Glucerna 1.5 Vidal (GLUCERNA1.5RTH)  Total Volume for 24 Hours (mL): 990  Continuous  Starting Tube Feed Rate {mL per Hour}: 10  Increase Tube Feed Rate by (mL): 10     Every 4 hours  Until Goal Tube Feed Rate (mL per Hour): 55  Tube Feed Duration (in Hours): 18  Tube Feed Start Time: 07:30   Frequency: Every 4 Hours  Entered: Feb 4 2024  9:21PM  
This patient has been assessed with a concern for Malnutrition and has been determined to have a diagnosis/diagnoses of Severe protein-calorie malnutrition and Underweight (BMI < 19).    This patient is being managed with:   Diet NPO with Tube Feed-  Tube Feeding Modality: Gastrostomy  Glucerna 1.5 Vidal (GLUCERNA1.5RTH)  Total Volume for 24 Hours (mL): 900  Continuous  Starting Tube Feed Rate {mL per Hour}: 10  Increase Tube Feed Rate by (mL): 10     Every 4 hours  Until Goal Tube Feed Rate (mL per Hour): 50  Tube Feed Duration (in Hours): 18  Tube Feed Start Time: 07:30  Free Water Flush   Total Volume per Flush (mL): 60   Frequency: Every 4 Hours   Total Daily Volume of Flush (mL): 360  Carlos(7 Gm Arginine/7 Gm Glut/1.2 Gm HMB     Qty per Day:  2  Liquid Protein Supplement     Qty per Day:  1  Entered: Feb 1 2024  3:17PM    Diet NPO with Tube Feed-  Tube Feeding Modality: Gastrostomy  Jevity 1.2 Vidal (JEVITY1.2RTH)  Continuous  Starting Tube Feed Rate {mL per Hour}: 10  Increase Tube Feed Rate by (mL): 10     Every 4 hours  Until Goal Tube Feed Rate (mL per Hour): 50  Tube Feed Duration (in Hours): 24  Tube Feed Start Time: 07:30  Free Water Flush   Total Volume per Flush (mL): 60   Frequency: Every 4 Hours   Total Daily Volume of Flush (mL): 360  Entered: Jan 31 2024  1:18AM    The following pending diet order is being considered for treatment of Severe protein-calorie malnutrition and Underweight (BMI < 19):null

## 2024-02-19 NOTE — PROGRESS NOTE ADULT - PROBLEM SELECTOR PROBLEM 1
Functional quadriplegia
Bacteremia
Osteomyelitis of vertebra, sacral and sacrococcygeal region
Bacteremia

## 2024-02-19 NOTE — PROGRESS NOTE ADULT - PROBLEM SELECTOR PROBLEM 3
Acute hypoxic respiratory failure
Elevated LFTs
Acute hypoxic respiratory failure
Hypernatremia
Acute hypoxic respiratory failure
Hypothyroidism
Acute hypoxic respiratory failure
Hypothyroidism
Acute hypoxic respiratory failure
Elevated LFTs
Hypernatremia
Acute hypoxic respiratory failure
Hypotension

## 2024-02-19 NOTE — PROGRESS NOTE ADULT - PROBLEM SELECTOR PROBLEM 7
Urinary retention
Urinary retention
Constipation
Urinary retention
Constipation
Breast cancer
Breast cancer

## 2024-02-19 NOTE — PROGRESS NOTE ADULT - SUBJECTIVE AND OBJECTIVE BOX
DATE OF SERVICE: 02-19-24 @ 10:17    Patient is a 68y old  Female who presents with a chief complaint of Sepsis 2/2 osteomyelitis (18 Feb 2024 14:46)      INTERVAL HISTORY:     REVIEW OF SYSTEMS:  CONSTITUTIONAL: No weakness  EYES/ENT: No visual changes;  No throat pain   NECK: No pain or stiffness  RESPIRATORY: No cough, wheezing; No shortness of breath  CARDIOVASCULAR: No chest pain or palpitations  GASTROINTESTINAL: No abdominal  pain. No nausea, vomiting, or hematemesis  GENITOURINARY: No dysuria, frequency or hematuria  NEUROLOGICAL: No stroke like symptoms  SKIN: No rashes    TELEMETRY Personally reviewed: ACC Junctional SR   	  MEDICATIONS:  droxidopa 300 milliGRAM(s) Oral every 8 hours  midodrine 10 milliGRAM(s) Oral every 8 hours        PHYSICAL EXAM:  T(C): 37.1 (02-19-24 @ 05:16), Max: 37.1 (02-18-24 @ 21:00)  HR: 85 (02-19-24 @ 05:16) (78 - 97)  BP: 104/69 (02-19-24 @ 05:16) (96/68 - 104/69)  RR: 18 (02-19-24 @ 05:16) (18 - 18)  SpO2: 95% (02-19-24 @ 05:16) (94% - 95%)  Wt(kg): --  I&O's Summary    18 Feb 2024 07:01  -  19 Feb 2024 07:00  --------------------------------------------------------  IN: 2095 mL / OUT: 750 mL / NET: 1345 mL          Appearance: In no distress	  HEENT:    PERRL, EOMI	  Cardiovascular:  S1 S2, No JVD  Respiratory: Lungs clear to auscultation	  Gastrointestinal:  Soft, Non-tender, + BS	  Vascularature:  No edema of LE  Psychiatric: Appropriate affect   Neuro: no acute focal deficits                     Labs personally reviewed      ASSESSMENT/PLAN: 	  67 yo F with hx TBI (nonverbal, bedbound/contracted at baseline) s/p PEG last admission due to recurrent hypernatremia and chronic malnutrition, CVA, hypothyroidism, L breast cancer (s/p mastectomy tissue expander still in place ) who presented for decreased responsiveness and hypotension, found to have sepsis with MSSA bacteremia, likely source sacral ulcer w/ c/f possible osteomyelitis. Subsequently required ICU care given hypotension 2/2 likely septic shock, requiring pressors.       1. Problem/Plan:  Problem: Hypotension - improving   Septic Shock 2/2 Bacteremia with MSSA.   - Previously in MICU requiring pressors, now off levo   -c/w Midodrine 10mg PO q8h  - c/w droxidopa - decreased to 300 mg q8h    2. Problem/Plan:  Problem: Hypothyroidism.   - Continue levothyroxine  - TSH wnl.    3. Problem/Plan:  Problem: DVT ppx  - Continue subq Heparin            DAVIDA Warren DO [facility]  Cardiovascular Medicine  [address] [place] [address], [address]  Available through call or text on [employer]  Office: [phone]

## 2024-02-19 NOTE — PROGRESS NOTE ADULT - PROBLEM SELECTOR PLAN 3
- New Elevated LFTs  - Unclear if tender on exam  - US shows Hepatic steatosis. No focal hepatic lesion. Cholelithiasis without evidence of cholecystitis.  1.5 cm echogenic lesion in the posterior right renal cortex may be minimally increased in size compared to prior ultrasound performed 4/21/2021, and corresponds to enhancing lesion noted on CT. Findings suggest angiomyolipoma   - continue to trend
Patient now transferred to [address] [place] [address] due to hypoxia. PNA less likely as patient currently on cefepime and metronidazole for osteomyelitis, though notably without MRSA coverage. CXR with left sided fluid. No clinical signs of DVT or PE  - Furosemide 20 mg IV 1x today  - Wean off O2 as tolerated
Patient now transferred to [address] [place] [address] due to hypoxia. PNA less likely as patient currently on cefepime and metronidazole for osteomyelitis, though notably without MRSA coverage. CXR with left sided fluid. No clinical signs of DVT or PE. O2 requirements has increased overnight and now requiring HFNC. Coarse crackles on   - Furosemide 20 mg IV given on 2/10 with no improvement  - Wean off O2 as tolerated, now on RA  - Pulmonary recs appreciated, recommend duonebs q6, hypersal, chest PT  - Deep suctioning requested from RT at time of exam
Patient now transferred to [address] [place] [address] due to hypoxia. PNA less likely as patient currently on cefepime and metronidazole for osteomyelitis, though notably without MRSA coverage. CXR with left sided fluid. No clinical signs of DVT or PE. O2 requirements has increased overnight and now requiring HFNC. Coarse crackles on   - Furosemide 20 mg IV given on 2/10 with no improvement.  - Wean off O2 as tolerated  - Pulmonary consulted given HFNC use. Recommend duonebs q6, hypersal, chest PT  - Deep suctioning requested from RT at time of exam
- Na of 151, likely 2/2 to dehydration and malnutrition from PEG tube now improved  - continue NS IVF  -Now sodium last 142 resolved.   - free water 200 cc q6h   - trend, adjust free water as needed   - PEG tube placed 1/11  - continue tube feeds of jevity 1.2 @10 and increase to 50 total  - Nutrition consult  Followup BMP
Patient now transferred to [address] [place] [address] due to hypoxia. PNA less likely as patient currently on cefepime and metronidazole for osteomyelitis, though notably without MRSA coverage. CXR with left sided fluid. No clinical signs of DVT or PE. O2 requirements has increased overnight and now requiring HFNC. Coarse crackles on   - Furosemide 20 mg IV given on 2/10 with no improvement.  - Wean off O2 as tolerated  - Pulmonary consulted given HFNC use  - Deep suctioning requested from RT at time of exam
- C/w levothyroxine  - TSH wnl.
Patient now transferred to [address] [place] [address] due to hypoxia. PNA less likely as patient currently on cefepime and metronidazole for osteomyelitis, though notably without MRSA coverage. CXR with left sided fluid. No clinical signs of DVT or PE. O2 requirements has increased overnight and now requiring HFNC. Coarse crackles on   - Furosemide 20 mg IV given on 2/10 with no improvement  - Wean off O2 as tolerated, now on NC  - Pulmonary recs appreciated, recommend duonebs q6, hypersal, chest PT  - Deep suctioning requested from RT at time of exam
Patient now transferred to [address] [place] [address] due to hypoxia. PNA less likely as patient currently on cefepime and metronidazole for osteomyelitis, though notably without MRSA coverage. CXR with left sided fluid. No clinical signs of DVT or PE. O2 requirements has increased overnight and now requiring HFNC. Coarse crackles on   - Furosemide 20 mg IV given on 2/10 with no improvement.  - Wean off O2 as tolerated  - Pulmonary consulted given HFNC use. Recommend duonebs q6, hypersal, chest PT  - Deep suctioning requested from RT at time of exam
Patient now transferred to [address] [place] [address] due to hypoxia. PNA less likely as patient currently on cefepime and metronidazole for osteomyelitis, though notably without MRSA coverage. CXR with left sided fluid. No clinical signs of DVT or PE. O2 requirements has increased overnight and now requiring HFNC. Coarse crackles on   - Furosemide 20 mg IV given on 2/10 with no improvement  - Wean off O2 as tolerated  - Pulmonary consulted given HFNC use. Recommend duonebs q6, hypersal, chest PT  - Deep suctioning requested from RT at time of exam
Patient now transferred to [address] [place] [address] due to hypoxia. PNA less likely as patient currently on cefepime and metronidazole for osteomyelitis, though notably without MRSA coverage. CXR with left sided fluid. No clinical signs of DVT or PE. O2 requirements has increased overnight and now requiring HFNC. Coarse crackles on   - Furosemide 20 mg IV given on 2/10 with no improvement  - Wean off O2 as tolerated  - Pulmonary consulted given HFNC use. Recommend duonebs q6, hypersal, chest PT  - Deep suctioning requested from RT at time of exam
- C/w levothyroxine  - TSH wnl.
- New Elevated LFTs  - Unclear if tender on exam  - US shows Hepatic steatosis. No focal hepatic lesion. Cholelithiasis without evidence of cholecystitis.  1.5 cm echogenic lesion in the posterior right renal cortex may be minimally increased in size compared to prior ultrasound performed 4/21/2021, and corresponds to enhancing lesion noted on CT. Findings suggest angiomyolipoma   - continue to trend
- Na of 151, likely 2/2 to dehydration and malnutrition from PEG tube  - 0.45% Saline @125  - Free water flushed of 60cc q4h  - PEG tube placed 1/11  - Start tube feeds of jevity 1.2 @10 and increase to 50 total  - Nutrition consult  - BMP q12
Patient now transferred to [address] [place] [address] due to hypoxia. PNA less likely as patient currently on cefepime and metronidazole for osteomyelitis, though notably without MRSA coverage. CXR with left sided fluid. No clinical signs of DVT or PE. O2 requirements has increased overnight and now requiring HFNC. Coarse crackles on   - Furosemide 20 mg IV given on 2/10 with no improvement  - Wean off O2 as tolerated, now on NC  - Pulmonary recs appreciated, recommend duonebs q6, hypersal, chest PT  - Deep suctioning requested from RT at time of exam
Patient now transferred to [address] [place] [address] due to hypoxia. PNA less likely as patient currently on cefepime and metronidazole for osteomyelitis, though notably without MRSA coverage. CXR with left sided fluid. No clinical signs of DVT or PE. O2 requirements has increased overnight and now requiring HFNC. Coarse crackles on   - Furosemide 20 mg IV given on 2/10 with no improvement  - Wean off O2 as tolerated, now on NC  - Pulmonary recs appreciated, recommend duonebs q6, hypersal, chest PT  - Deep suctioning requested from RT at time of exam
pt remains hypotensive  defer to primary team

## 2024-02-19 NOTE — PROGRESS NOTE ADULT - PROBLEM SELECTOR PROBLEM 6
Hypothyroidism
Breast cancer
Constipation
Breast cancer
Constipation
Hypothyroidism
Constipation

## 2024-02-19 NOTE — PROGRESS NOTE ADULT - NSPROGADDITIONALINFOA_GEN_ALL_CORE
Dispo planning
Dispo planning  Discussed w/  Dr Arthur
Case d/w ACP via MS teams  Attempted to call patients , did not connect over phone
Case d/w ACP via MS teams
d/w  Dr Arthur
Case d/w ACP via MS teams
Case d/w ACP via MS teams   Attempted to call /HCP but went to VM
d/w GARRET López
d/w  Dr Arthur
d/w NP Erika from Palliative and  at bedside

## 2024-02-19 NOTE — PROGRESS NOTE ADULT - SUBJECTIVE AND OBJECTIVE BOX
PROGRESS NOTE:   Authored by Dr. Ciera Phillips MD, Available on MS Teams    Patient is a 68y old  Female who presents with a chief complaint of Sepsis 2/2 osteomyelitis (19 Feb 2024 10:16)      SUBJECTIVE / OVERNIGHT EVENTS: No acute events overnight.     ADDITIONAL REVIEW OF SYSTEMS:    MEDICATIONS  (STANDING):  albuterol/ipratropium for Nebulization 3 milliLiter(s) Nebulizer every 6 hours  artificial  tears Solution 1 Drop(s) Both EYES two times a day  ascorbic acid 500 milliGRAM(s) Oral daily  ceFAZolin   IVPB 2000 milliGRAM(s) IV Intermittent every 8 hours  ceFAZolin   IVPB      chlorhexidine 2% Cloths 1 Application(s) Topical <User Schedule>  Dakins Solution - 1/4 Strength 1 Application(s) Topical every 12 hours  droxidopa 300 milliGRAM(s) Oral every 8 hours  folic acid 1 milliGRAM(s) Oral daily  heparin   Injectable 5000 Unit(s) SubCutaneous every 12 hours  insulin lispro (ADMELOG) corrective regimen sliding scale   SubCutaneous every 6 hours  levothyroxine Injectable 80 MICROGram(s) IV Push at bedtime  metroNIDAZOLE  IVPB 500 milliGRAM(s) IV Intermittent every 12 hours  midodrine 10 milliGRAM(s) Oral every 8 hours  multivitamin 1 Tablet(s) Oral daily  polyethylene glycol 3350 17 Gram(s) Oral daily  senna 2 Tablet(s) Oral at bedtime  sertraline 25 milliGRAM(s) Oral daily  sodium chloride 3%  Inhalation 4 milliLiter(s) Inhalation every 6 hours  tamoxifen 20 milliGRAM(s) Oral daily  thiamine 100 milliGRAM(s) Oral daily    MEDICATIONS  (PRN):      CAPILLARY BLOOD GLUCOSE      POCT Blood Glucose.: 151 mg/dL (19 Feb 2024 06:02)  POCT Blood Glucose.: 133 mg/dL (19 Feb 2024 00:08)  POCT Blood Glucose.: 149 mg/dL (18 Feb 2024 17:29)  POCT Blood Glucose.: 177 mg/dL (18 Feb 2024 13:05)    I&O's Summary    18 Feb 2024 07:01  -  19 Feb 2024 07:00  --------------------------------------------------------  IN: 2095 mL / OUT: 750 mL / NET: 1345 mL    19 Feb 2024 07:01  -  19 Feb 2024 11:45  --------------------------------------------------------  IN: 0 mL / OUT: 100 mL / NET: -100 mL        PHYSICAL EXAM:  Vital Signs Last 24 Hrs  T(C): 36.4 (19 Feb 2024 10:53), Max: 37.1 (18 Feb 2024 21:00)  T(F): 97.6 (19 Feb 2024 10:53), Max: 98.8 (18 Feb 2024 21:00)  HR: 97 (19 Feb 2024 10:53) (78 - 97)  BP: 114/80 (19 Feb 2024 10:53) (96/68 - 114/80)  BP(mean): --  RR: 18 (19 Feb 2024 10:53) (18 - 18)  SpO2: 96% (19 Feb 2024 10:53) (94% - 96%)    Parameters below as of 19 Feb 2024 10:53  Patient On (Oxygen Delivery Method): room air        CONSTITUTIONAL: ill appearing  RESPIRATORY: Normal respiratory effort; lungs are clear to auscultation bilaterally  CARDIOVASCULAR: Regular rate and rhythm, normal S1 and S2, no murmur/rub/gallop; No lower extremity edema  ABDOMEN: Nontender to palpation, normoactive bowel sounds, no rebound/guarding  MUSCLOSKELETAL: contracted extremities   PSYCH: A+O x0

## 2024-02-19 NOTE — PROGRESS NOTE ADULT - PROBLEM SELECTOR PLAN 7
Pt with urinary retention  - avila placed  - strict Is&Os
Pt with urinary retention  - avila placed  - strict Is&Os
- Cw tamoxifen
Chronic  - Cw miralax and senna
Pt with urinary retention  - avila placed  - strict Is&Os
Pt with urinary retention  - avila placed  - strict Is&Os
Chronic  - Cw miralax and senna
Pt with urinary retention, straight cath x1  - bladder scan q8
Pt with urinary retention  - avila placed  - strict Is&Os
- Cw tamoxifen

## 2024-02-19 NOTE — PROGRESS NOTE ADULT - PROBLEM SELECTOR PLAN 1
Septic Shock 2/2 Bacteremia with MSSA. Source believed to be sacral wound.   - Currently on midodrine 10 mg q8h as HRs normalized. Continue current dosing, plan to wean midodrine back to home dose(5q8hr) after weaning droxidopa.   - droxidopa - decrease to 300 mg q8h.  - broadened abx from cefazolin to cefepime/flagyl per ID recs 2/5  - repeat blood cultures negative, clear as of 1/1   - unable to undergo TTE due to contractures   - Followup cefepime trough (per pharmacy request)
Septic Shock 2/2 Bacteremia with MSSA. Source believed to be sacral wound.   - Currently on midodrine 10 mg q8h as HRs normalized. Continue current dosing, plan to wean midodrine back to home dose(5q8hr) after weaning droxidopa.   - droxidopa - decreased to 300 mg q8h.  - abx switched back to cefazolin until 2/29 to complete 4 week course per ID recs  - unable to undergo TTE due to contractures  - Cardiology consulted and following, appreciate recommendations with respect to medication weaning
Septic Shock 2/2 Bacteremia with MSSA. Source believed to be sacral wound.   - Currently on midodrine 10 mg q8h as HRs normalized. Continue current dosing, plan to wean midodrine back to home dose(5q8hr) after weaning droxidopa.   - droxidopa - decreased to 300 mg q8h.  - abx switched back to cefazolin per ID recs  - unable to undergo TTE due to contractures  - Cardiology consulted and following, appreciate recommendations with respect to medication weaning
Sepsis 2/2 sacral wounds vs OM   - Pt received Vanc/Zosyn in ED and is septic so will continue empiric abx  - ID recs appreciated, continue vanc/zosyn  - pending MRI spine  - F/u blood cx  - Wound care consult  - Palliative consulted, per  pt is FULL CODE. Plan for family meeting likely on 2/1  - continue IVF, Hydrocortisone q8, midodrine q8  - low threshold for MICU reconsult
Septic Shock 2/2 Bacteremia with MSSA. Source believed to be sacral wound.   - Currently on midodrine 10 mg q8h as HRs normalized. Continue current dosing, plan to wean midodrine back to home dose(5q8hr) after weaning droxidopa.   - droxidopa - decreased to 300 mg q8h.  - abx switched back to cefazolin until 2/29 to complete 4 week course per ID recs  - unable to undergo TTE due to contractures  - Cardiology consulted and following, appreciate recommendations with respect to medication weaning
Septic Shock 2/2 Bacteremia with MSSA. Source believed to be sacral wound.   - Currently on midodrine 10 mg q8h as HRs normalized. Continue current dosing, plan to wean midodrine back to home dose(5q8hr) after weaning droxidopa.   - droxidopa - decreased to 300 mg q8h.  - abx switched back to cefazolin until 2/29 to complete 4 week course per ID recs  - unable to undergo TTE due to contractures  - Cardiology consulted and following, appreciate recommendations with respect to medication weaning
Septic Shock 2/2 Bacteremia with MSSA. Source believed to be sacral wound.   - Currently on midodrine 10 mg q8h as HRs normalized. Continue current dosing, plan to wean midodrine back to home dose(5q8hr) after weaning droxidopa.   - droxidopa - decrease to 300 mg q8h.  - broadened abx from cefazolin to cefepime/flagyl per ID recs  - unable to undergo TTE due to contractures
Septic Shock 2/2 Bacteremia with MSSA. Source believed to be sacral wound.   - Currently on midodrine 10 mg q8h as HRs normalized. Continue current dosing, plan to wean midodrine back to home dose(5q8hr) after weaning droxidopa.   - droxidopa - decreased to 300 mg q8h.  - abx switched back to cefazolin until 2/29 to complete 4 week course per ID recs  - unable to undergo TTE due to contractures  - Cardiology consulted and following, appreciate recommendations with respect to medication weaning
ppsv 10%: pt nonverbal and bedbound at baseline, requires assistance with all ADL, care, turn and positioning
Patient's Bcx growing MSSA likely 2/2 sacral wounds  - vanc/zosyn switched to ancef on 2/1  - repeat cultures pending  - f/u TTE  - ID recs greatly appreciated
Septic Shock 2/2 Bacteremia with MSSA. Source believed to be sacral wound.   - Currently on midodrine 10 mg q8h as HRs normalized. Continue current dosing, plan to wean midodrine back to home dose(5q8hr) after weaning droxidopa.   - droxidopa - decreased to 300 mg q8h.  - broadened abx from cefazolin to cefepime/flagyl per ID recs  - unable to undergo TTE due to contractures
Septic Shock 2/2 Bacteremia with MSSA. Source believed to be sacral wound.   - Currently on midodrine 10 mg q8h as HRs normalized. Continue current dosing, plan to wean midodrine back to home dose(5q8hr) after weaning droxidopa.   - droxidopa - decreased to 300 mg q8h.  - broadened abx from cefazolin to cefepime/flagyl per ID recs  - unable to undergo TTE due to contractures  - Cardiology consulted and following, appreciate recommendations with respect to medication weaning
Septic Shock 2/2 Bacteremia  now off levo  - d/c'd midodrine due to bradycardia, now restarted at 10 mg q8h as HRs normalized. Continue current dosing, plan to wean midodrine back to home dose(5q8hr) after weaning droxidopa.   - droxidopa - decrease to 300 mg q8h.  - MAP goal >60        MSSA bacteremia  > sacral wound, likely source of sepsis/bacteremia  - broadened abx from cefazolin to cefepime/flagyll per ID recs 2/5  - repeat blood cultures negative, clear as of 1/1   - unable to undergo TTE due to contractures   - Followup cefepime trough (per pharmacy request)
Septic Shock 2/2 Bacteremia with MSSA. Source believed to be sacral wound.   - Currently on midodrine 10 mg q8h as HRs normalized. Continue current dosing, plan to wean midodrine back to home dose(5q8hr) after weaning droxidopa.   - droxidopa - decreased to 300 mg q8h.  - abx switched back to cefazolin until 2/29 to complete 4 week course per ID recs  - unable to undergo TTE due to contractures  - Cardiology consulted and following, appreciate recommendations with respect to medication weaning
Patient's Bcx growing MSSA likely 2/2 sacral wounds  - vanc/zosyn switched to ancef on 2/1  - repeat cultures pending  - f/u TTE  - ID recs greatly appreciated

## 2024-02-19 NOTE — PROGRESS NOTE ADULT - PROBLEM SELECTOR PLAN 6
Chronic  - Cw miralax and senna
- Cw levothyroxine  - TSH wnl
Chronic  - Cw miralax and senna
Chronic  - Cw miralax and senna
- Cw tamoxifen
- Cw tamoxifen.
- Cw levothyroxine  - TSH wnl
Chronic  - Cw miralax and senna

## 2024-02-19 NOTE — PROGRESS NOTE ADULT - PROBLEM SELECTOR PROBLEM 4
Hypothyroidism
Breast cancer
QT prolongation
QT prolongation
Hypothyroidism
Hypernatremia
Hypernatremia
Hypothyroidism
Breast cancer
Advanced care planning/counseling discussion
Hypothyroidism

## 2024-02-19 NOTE — PROGRESS NOTE ADULT - PROBLEM SELECTOR PLAN 2
Sepsis 2/2 sacral wounds vs OM. MRI spine shows Prominent sacral decubitus ulcer extending to the coccygeal tip without evidence of underlying osteomyelitis or discrete fluid collections in the surrounding soft tissues.  - ID recs appreciated, antibiotics as above    - MRI spine shows Prominent sacral decubitus ulcer extending to the coccygeal tip without evidence of underlying osteomyelitis or discrete fluid collections in the surrounding soft tissues.  - Followup wound care recs.   - Palliative consulted, per  pt is FULL CODE
- New Elevated LFTs  - Unclear if tender on exam  - F/u abdominal US  - continue to trend
Sepsis 2/2 sacral wounds vs OM. MRI spine shows Prominent sacral decubitus ulcer extending to the coccygeal tip without evidence of underlying osteomyelitis or discrete fluid collections in the surrounding soft tissues.  - ID recs appreciated, antibiotics as above    - MRI spine shows Prominent sacral decubitus ulcer extending to the coccygeal tip without   evidence of underlying osteomyelitis or discrete fluid collections in the surrounding soft tissues.  - Followup wound care recs.   - Palliative consulted, per  pt is FULL CODE
Sepsis 2/2 sacral wounds vs OM. MRI spine shows Prominent sacral decubitus ulcer extending to the coccygeal tip without evidence of underlying osteomyelitis or discrete fluid collections in the surrounding soft tissues.  - ID recs appreciated, antibiotics as above    - MRI spine shows Prominent sacral decubitus ulcer extending to the coccygeal tip without evidence of underlying osteomyelitis or discrete fluid collections in the surrounding soft tissues.  - Followup wound care recs  - Palliative consulted, per  pt is FULL CODE
Sepsis 2/2 sacral wounds vs OM. MRI spine shows Prominent sacral decubitus ulcer extending to the coccygeal tip without evidence of underlying osteomyelitis or discrete fluid collections in the surrounding soft tissues.  - ID recs appreciated, antibiotics as above    - MRI spine shows Prominent sacral decubitus ulcer extending to the coccygeal tip without evidence of underlying osteomyelitis or discrete fluid collections in the surrounding soft tissues.  - Followup wound care recs.   - Palliative consulted, per  pt is FULL CODE
Sepsis 2/2 sacral wounds vs OM   - ID recs appreciated.  See Abx as above.   - MRI spine shows Prominent sacral decubitus ulcer extending to the coccygeal tip without   evidence of underlying osteomyelitis or discrete fluid collections in the surrounding soft tissues.  - Followup wound care recs.   - Palliative consulted, per  pt is FULL CODE  Patient on Midodrine 10q8hr.
Sepsis 2/2 sacral wounds vs OM. MRI spine shows Prominent sacral decubitus ulcer extending to the coccygeal tip without evidence of underlying osteomyelitis or discrete fluid collections in the surrounding soft tissues.  - ID recs appreciated, antibiotics as above    - MRI spine shows Prominent sacral decubitus ulcer extending to the coccygeal tip without evidence of underlying osteomyelitis or discrete fluid collections in the surrounding soft tissues.  - Followup wound care recs.   - Palliative consulted, per  pt is FULL CODE
Sepsis 2/2 sacral wounds vs OM   - ID recs appreciated  - MRI spine shows Prominent sacral decubitus ulcer extending to the coccygeal tip without   evidence of underlying osteomyelitis or discrete fluid collections in the surrounding soft tissues.  - F/u blood cx  - Wound care consult  - Palliative consulted, per  pt is FULL CODE  - continue IVF, taper down Hydrocortisone to q12, continue midodrine q8  - MICU consult if needed
Sepsis 2/2 sacral wounds vs OM   - ID recs appreciated  - pending MRI spine  - F/u blood cx  - Wound care consult  - Palliative consulted, per  pt is FULL CODE  - continue IVF, taper down Hydrocortisone to q12, continue midodrine q8  - MICU consult if needed
c/w ABX  defer to primary team

## 2024-02-20 ENCOUNTER — TRANSCRIPTION ENCOUNTER (OUTPATIENT)
Age: 69
End: 2024-02-20

## 2024-02-20 VITALS
RESPIRATION RATE: 18 BRPM | DIASTOLIC BLOOD PRESSURE: 65 MMHG | HEART RATE: 95 BPM | TEMPERATURE: 98 F | OXYGEN SATURATION: 95 % | SYSTOLIC BLOOD PRESSURE: 97 MMHG

## 2024-02-20 LAB
ANION GAP SERPL CALC-SCNC: 13 MMOL/L — SIGNIFICANT CHANGE UP (ref 5–17)
BUN SERPL-MCNC: 19 MG/DL — SIGNIFICANT CHANGE UP (ref 7–23)
CALCIUM SERPL-MCNC: 8.6 MG/DL — SIGNIFICANT CHANGE UP (ref 8.4–10.5)
CHLORIDE SERPL-SCNC: 104 MMOL/L — SIGNIFICANT CHANGE UP (ref 96–108)
CO2 SERPL-SCNC: 23 MMOL/L — SIGNIFICANT CHANGE UP (ref 22–31)
CREAT SERPL-MCNC: 0.31 MG/DL — LOW (ref 0.5–1.3)
EGFR: 113 ML/MIN/1.73M2 — SIGNIFICANT CHANGE UP
GLUCOSE BLDC GLUCOMTR-MCNC: 131 MG/DL — HIGH (ref 70–99)
GLUCOSE BLDC GLUCOMTR-MCNC: 153 MG/DL — HIGH (ref 70–99)
GLUCOSE BLDC GLUCOMTR-MCNC: 156 MG/DL — HIGH (ref 70–99)
GLUCOSE BLDC GLUCOMTR-MCNC: 159 MG/DL — HIGH (ref 70–99)
GLUCOSE BLDC GLUCOMTR-MCNC: 162 MG/DL — HIGH (ref 70–99)
GLUCOSE SERPL-MCNC: 117 MG/DL — HIGH (ref 70–99)
HCT VFR BLD CALC: 35.5 % — SIGNIFICANT CHANGE UP (ref 34.5–45)
HGB BLD-MCNC: 9.7 G/DL — LOW (ref 11.5–15.5)
MAGNESIUM SERPL-MCNC: 2.3 MG/DL — SIGNIFICANT CHANGE UP (ref 1.6–2.6)
MCHC RBC-ENTMCNC: 27.2 PG — SIGNIFICANT CHANGE UP (ref 27–34)
MCHC RBC-ENTMCNC: 27.3 GM/DL — LOW (ref 32–36)
MCV RBC AUTO: 99.4 FL — SIGNIFICANT CHANGE UP (ref 80–100)
NRBC # BLD: 0 /100 WBCS — SIGNIFICANT CHANGE UP (ref 0–0)
PHOSPHATE SERPL-MCNC: 2.8 MG/DL — SIGNIFICANT CHANGE UP (ref 2.5–4.5)
PLATELET # BLD AUTO: 159 K/UL — SIGNIFICANT CHANGE UP (ref 150–400)
POTASSIUM SERPL-MCNC: 5.1 MMOL/L — SIGNIFICANT CHANGE UP (ref 3.5–5.3)
POTASSIUM SERPL-SCNC: 5.1 MMOL/L — SIGNIFICANT CHANGE UP (ref 3.5–5.3)
RBC # BLD: 3.57 M/UL — LOW (ref 3.8–5.2)
RBC # FLD: 19.3 % — HIGH (ref 10.3–14.5)
SARS-COV-2 RNA SPEC QL NAA+PROBE: SIGNIFICANT CHANGE UP
SODIUM SERPL-SCNC: 140 MMOL/L — SIGNIFICANT CHANGE UP (ref 135–145)
WBC # BLD: 7.25 K/UL — SIGNIFICANT CHANGE UP (ref 3.8–10.5)
WBC # FLD AUTO: 7.25 K/UL — SIGNIFICANT CHANGE UP (ref 3.8–10.5)

## 2024-02-20 PROCEDURE — 99239 HOSP IP/OBS DSCHRG MGMT >30: CPT

## 2024-02-20 RX ORDER — MIDODRINE HYDROCHLORIDE 2.5 MG/1
1 TABLET ORAL
Qty: 0 | Refills: 0 | DISCHARGE
Start: 2024-02-20

## 2024-02-20 RX ORDER — IPRATROPIUM/ALBUTEROL SULFATE 18-103MCG
3 AEROSOL WITH ADAPTER (GRAM) INHALATION
Qty: 0 | Refills: 0 | DISCHARGE
Start: 2024-02-20

## 2024-02-20 RX ORDER — HEPARIN SODIUM 5000 [USP'U]/ML
5000 INJECTION INTRAVENOUS; SUBCUTANEOUS
Qty: 0 | Refills: 0 | DISCHARGE
Start: 2024-02-20

## 2024-02-20 RX ORDER — LEVOTHYROXINE SODIUM 125 MCG
80 TABLET ORAL
Qty: 0 | Refills: 0 | DISCHARGE
Start: 2024-02-20

## 2024-02-20 RX ORDER — DROXIDOPA 100 MG/1
3 CAPSULE ORAL
Qty: 0 | Refills: 0 | DISCHARGE
Start: 2024-02-20

## 2024-02-20 RX ORDER — CEFAZOLIN SODIUM 1 G
100 VIAL (EA) INJECTION
Qty: 27 | Refills: 0
Start: 2024-02-20 | End: 2024-02-28

## 2024-02-20 RX ORDER — SODIUM HYPOCHLORITE 0.125 %
1 SOLUTION, NON-ORAL MISCELLANEOUS
Qty: 0 | Refills: 0 | DISCHARGE
Start: 2024-02-20

## 2024-02-20 RX ORDER — INSULIN LISPRO 100/ML
0 VIAL (ML) SUBCUTANEOUS
Qty: 0 | Refills: 0 | DISCHARGE
Start: 2024-02-20

## 2024-02-20 RX ADMIN — Medication 1 MILLIGRAM(S): at 13:13

## 2024-02-20 RX ADMIN — Medication 1: at 06:49

## 2024-02-20 RX ADMIN — Medication 1 APPLICATION(S): at 05:23

## 2024-02-20 RX ADMIN — Medication 1: at 18:16

## 2024-02-20 RX ADMIN — SODIUM CHLORIDE 4 MILLILITER(S): 9 INJECTION INTRAMUSCULAR; INTRAVENOUS; SUBCUTANEOUS at 13:13

## 2024-02-20 RX ADMIN — DROXIDOPA 300 MILLIGRAM(S): 100 CAPSULE ORAL at 13:13

## 2024-02-20 RX ADMIN — Medication 500 MILLIGRAM(S): at 13:13

## 2024-02-20 RX ADMIN — Medication 3 MILLILITER(S): at 13:13

## 2024-02-20 RX ADMIN — Medication 100 MILLIGRAM(S): at 13:15

## 2024-02-20 RX ADMIN — Medication 1 DROP(S): at 11:15

## 2024-02-20 RX ADMIN — Medication 100 MILLIGRAM(S): at 05:20

## 2024-02-20 RX ADMIN — Medication 3 MILLILITER(S): at 05:21

## 2024-02-20 RX ADMIN — SODIUM CHLORIDE 4 MILLILITER(S): 9 INJECTION INTRAMUSCULAR; INTRAVENOUS; SUBCUTANEOUS at 05:21

## 2024-02-20 RX ADMIN — HEPARIN SODIUM 5000 UNIT(S): 5000 INJECTION INTRAVENOUS; SUBCUTANEOUS at 05:21

## 2024-02-20 RX ADMIN — Medication 100 MILLIGRAM(S): at 13:12

## 2024-02-20 RX ADMIN — CHLORHEXIDINE GLUCONATE 1 APPLICATION(S): 213 SOLUTION TOPICAL at 05:23

## 2024-02-20 RX ADMIN — SERTRALINE 25 MILLIGRAM(S): 25 TABLET, FILM COATED ORAL at 13:14

## 2024-02-20 RX ADMIN — MIDODRINE HYDROCHLORIDE 10 MILLIGRAM(S): 2.5 TABLET ORAL at 05:20

## 2024-02-20 RX ADMIN — HEPARIN SODIUM 5000 UNIT(S): 5000 INJECTION INTRAVENOUS; SUBCUTANEOUS at 18:16

## 2024-02-20 RX ADMIN — SODIUM CHLORIDE 4 MILLILITER(S): 9 INJECTION INTRAMUSCULAR; INTRAVENOUS; SUBCUTANEOUS at 18:16

## 2024-02-20 RX ADMIN — Medication 1 TABLET(S): at 13:12

## 2024-02-20 RX ADMIN — TAMOXIFEN CITRATE 20 MILLIGRAM(S): 20 TABLET, FILM COATED ORAL at 13:15

## 2024-02-20 RX ADMIN — MIDODRINE HYDROCHLORIDE 10 MILLIGRAM(S): 2.5 TABLET ORAL at 13:12

## 2024-02-20 RX ADMIN — Medication 3 MILLILITER(S): at 00:16

## 2024-02-20 RX ADMIN — Medication 1: at 13:14

## 2024-02-20 RX ADMIN — Medication 3 MILLILITER(S): at 18:14

## 2024-02-20 RX ADMIN — SODIUM CHLORIDE 4 MILLILITER(S): 9 INJECTION INTRAMUSCULAR; INTRAVENOUS; SUBCUTANEOUS at 00:15

## 2024-02-20 RX ADMIN — DROXIDOPA 300 MILLIGRAM(S): 100 CAPSULE ORAL at 05:20

## 2024-02-20 RX ADMIN — Medication 100 MILLIGRAM(S): at 05:23

## 2024-02-20 RX ADMIN — POLYETHYLENE GLYCOL 3350 17 GRAM(S): 17 POWDER, FOR SOLUTION ORAL at 13:13

## 2024-02-20 RX ADMIN — Medication 1 APPLICATION(S): at 18:16

## 2024-02-20 NOTE — DISCHARGE NOTE PROVIDER - CARE PROVIDERS DIRECT ADDRESSES
[email or web address]

## 2024-02-20 NOTE — DISCHARGE NOTE PROVIDER - NSDCCPCAREPLAN_GEN_ALL_CORE_FT
PRINCIPAL DISCHARGE DIAGNOSIS  Diagnosis: Septic shock  Assessment and Plan of Treatment: Blood cultures grew MSSA. You were admitted to the medical ICU for blood pressure support. You were evaluated by Wound Care and Infectious Disease and treated with multiple IV antibiotics (Vancomycin, Zosyn Cefepime, Cefazolin, Metronidazole). You are being discharged with IV Cefazolin via midline IV until 2/29 to complete a 4 week course.  Follow up with your PMD, Cardiology, and Wound Care center      SECONDARY DISCHARGE DIAGNOSES  Diagnosis: Hypotension  Assessment and Plan of Treatment: secondary to septic shock. You required vasopressor medications to maintain your blood pressure. You were given Midodrine and a new medication called Droxidopa. Continue midodrine and droxidopa after discharge. Follow up with your PMD and cardiologist    Diagnosis: Acute hypoxic respiratory failure  Assessment and Plan of Treatment: possibly due to aspiration from tube feeds. You were placed on high flow nasal cannula, weaned to nasal cannula, and then weaned to room air prior to discharge. You were given nebulizer treatments and chest Physical Therapy to clear your airways. Follow up with your PMD and Cardiology    Diagnosis: QT prolongation  Assessment and Plan of Treatment: QTc 561ms, repeat 470s ms. Avoid QT prolonging medications. Follow up with Cardiology 1-2 weeks after discharge    Diagnosis: Urinary retention  Assessment and Plan of Treatment: Indwelling urinary catheter placed in the medical ICU, later removed. However you developed urinary retention and a indwelling urinary catheter (avila catheter) was re-inserted on 2/15. Attempt trial of void at [facility].     PRINCIPAL DISCHARGE DIAGNOSIS  Diagnosis: Septic shock  Assessment and Plan of Treatment: Blood cultures grew MSSA. You were admitted to the medical ICU for blood pressure support. You were evaluated by Wound Care and Infectious Disease and treated with multiple IV antibiotics (Vancomycin, Zosyn Cefepime, Cefazolin, Metronidazole). You are being discharged with IV Cefazolin via midline IV until 2/29 to complete a 4 week course.  Follow up with your PMD, Cardiology, and Wound Care center      SECONDARY DISCHARGE DIAGNOSES  Diagnosis: Hypotension  Assessment and Plan of Treatment: secondary to septic shock. You required vasopressor medications to maintain your blood pressure. You were given Midodrine and a new medication called Droxidopa. Continue midodrine and droxidopa after discharge. Follow up with your PMD and cardiologist    Diagnosis: Acute hypoxic respiratory failure  Assessment and Plan of Treatment: possibly due to aspiration from tube feeds. You were placed on high flow nasal cannula, weaned to nasal cannula, and then weaned to room air prior to discharge. You were given nebulizer treatments and chest Physical Therapy to clear your airways. Follow up with your PMD and Cardiology    Diagnosis: QT prolongation  Assessment and Plan of Treatment: QTc 561ms, repeat 470s ms. Avoid QT prolonging medications. Follow up with Cardiology 1-2 weeks after discharge    Diagnosis: Urinary retention  Assessment and Plan of Treatment: Indwelling urinary catheter placed in the medical ICU, later removed. However you developed urinary retention and a indwelling urinary catheter (avila catheter) was re-inserted on 2/15. Attempt trial of void at [facility].    Diagnosis: Multiple wounds of skin  Assessment and Plan of Treatment: Sacral/ Buttocks /right hip stage 4 pressure injury  left shoulder/ hip/spine unstageable pressure injury  Bilateral Ear DTI  Incontinence Associated Dermatitis  Lt foot DTI  WOUND CARE INSTRUCTIONS:  Rt Hip & Sacrum  -continue w/ pack w/ 1/4 strength Dakins dressing BID  Rt buttocks - Pack w/  Aquacel dressing QD  Lt Hip /lt shoulder- Betadine + ALLEVYN QD  Spine - betadine + Aquacel + Allevyn QD  Bilateral ears - cavilon QD  Buttocks CAVILON Advance TIW and pericare BID and prn soiling        Continue w/ attends under pads and Pericare as per protocol  Left foot DTI--Betadine QD  Moisturize intact skin w/ SWEEN cream BID  Waffle Cushion to chair when oob to chair  Continue w/ low air loss pressure redistribution bed surface   Pt will need Group 2 mattress on hospital bed and ROHO cushion for wheel chair upon discharge home  Upon discharge f/u as outpatient at Wound Center [address] [facility] [phone]       PRINCIPAL DISCHARGE DIAGNOSIS  Diagnosis: Septic shock  Assessment and Plan of Treatment: Blood cultures grew MSSA. You were admitted to the medical ICU for blood pressure support. You were evaluated by Wound Care and Infectious Disease and treated with multiple IV antibiotics (Vancomycin, Zosyn Cefepime, Cefazolin, Metronidazole). You are being discharged with IV Cefazolin via midline IV until 2/29 to complete a 4 week course. In addition, please take Midodrine as needed to maintain blood pressure of 90mmhg or higher.   Follow up with your PMD, Cardiology, and Wound Care center      SECONDARY DISCHARGE DIAGNOSES  Diagnosis: QT prolongation  Assessment and Plan of Treatment: QTc 561ms, repeat 470s ms. Avoid QT prolonging medications. Follow up with Cardiology 1-2 weeks after discharge    Diagnosis: Acute hypoxic respiratory failure  Assessment and Plan of Treatment: possibly due to aspiration from tube feeds. You were placed on high flow nasal cannula, weaned to nasal cannula, and then weaned to room air prior to discharge. You were given nebulizer treatments and chest Physical Therapy to clear your airways. Follow up with your PMD and Cardiology    Diagnosis: Hypotension  Assessment and Plan of Treatment: secondary to septic shock. You required vasopressor medications to maintain your blood pressure. You were given Midodrine and a new medication called Droxidopa. Continue midodrine and droxidopa after discharge. Follow up with your PMD and cardiologist    Diagnosis: Urinary retention  Assessment and Plan of Treatment: Indwelling urinary catheter placed in the medical ICU, later removed. However you developed urinary retention and a indwelling urinary catheter (avila catheter) was re-inserted on 2/15. Attempt trial of void at [facility].    Diagnosis: Multiple wounds of skin  Assessment and Plan of Treatment: Sacral/ Buttocks /right hip stage 4 pressure injury  left shoulder/ hip/spine unstageable pressure injury  Bilateral Ear DTI  Incontinence Associated Dermatitis  Lt foot DTI  WOUND CARE INSTRUCTIONS:  Rt Hip & Sacrum  -continue w/ pack w/ 1/4 strength Dakins dressing BID  Rt buttocks - Pack w/  Aquacel dressing QD  Lt Hip /lt shoulder- Betadine + ALLEVYN QD  Spine - betadine + Aquacel + Allevyn QD  Bilateral ears - cavilon QD  Buttocks CAVILON Advance TIW and pericare BID and prn soiling        Continue w/ attends under pads and Pericare as per protocol  Left foot DTI--Betadine QD  Moisturize intact skin w/ SWEEN cream BID  Waffle Cushion to chair when oob to chair  Continue w/ low air loss pressure redistribution bed surface   Pt will need Group 2 mattress on hospital bed and AIDA cushion for wheel chair upon discharge home  Upon discharge f/u as outpatient at Wound Center [address] [facility] [phone]

## 2024-02-20 NOTE — DISCHARGE NOTE PROVIDER - ATTENDING DISCHARGE PHYSICAL EXAMINATION:
Vital Signs Last 24 Hrs  T(C): 36.7 (20 Feb 2024 12:40), Max: 37.7 (20 Feb 2024 08:16)  T(F): 98 (20 Feb 2024 12:40), Max: 99.8 (20 Feb 2024 08:16)  HR: 94 (20 Feb 2024 12:40) (94 - 100)  BP: 101/63 (20 Feb 2024 12:40) (101/63 - 120/77)  BP(mean): --  RR: 18 (20 Feb 2024 12:40) (18 - 18)  SpO2: 98% (20 Feb 2024 12:40) (95% - 99%)    Parameters below as of 20 Feb 2024 12:40  Patient On (Oxygen Delivery Method): room air        CONSTITUTIONAL: frail elderly lady   EYES: PERRLA  RESPIRATORY: Normal respiratory effort; lungs are clear to auscultation bilaterally  CARDIOVASCULAR: Regular rate and rhythm, normal S1 and S2, no murmur/rub/gallop; No lower extremity edema; Peripheral pulses are 2+ bilaterally  ABDOMEN: Nontender to palpation, normoactive bowel sounds, no rebound/guarding; No hepatosplenomegaly  MUSCULOSKELETAL: contracted extremities   PSYCH: A+Ox0  NEUROLOGY: unable to assess due to poor mentations   SKIN: sacral wound

## 2024-02-20 NOTE — DISCHARGE NOTE PROVIDER - INSTRUCTIONS
Tube Feeding Modality: Gastrostomy  Glucerna 1.5 Vidal  Total Volume for 24 hours (mL): 990  Continuous Starting Tube Feed Rate (mL per Hour): 10  Increase Tube Feed Rate by (mL): 10 every 4 hours  Until Goal Tube Feed Rate (mL per Hour): 55  Tube Feed Duration (in Hours): 18  Tube Feed Start Time: 07:30   Frequency: every 4 hours

## 2024-02-20 NOTE — DISCHARGE NOTE NURSING/CASE MANAGEMENT/SOCIAL WORK - NSDCPEFALRISK_GEN_ALL_CORE
For information on Fall & Injury Prevention, visit: [email or web address] OR  [email or web address] OR  https://www.cdc.gov/steadi/patient.html Black X (History of Violence or Aggression)

## 2024-02-20 NOTE — DISCHARGE NOTE PROVIDER - NSDCFUADDAPPT_GEN_ALL_CORE_FT
APPTS ARE READY TO BE MADE: [x ] YES    Best Family or Patient Contact (if needed):    Additional Information about above appointments (if needed):    1:   2:   3:     Other comments or requests:    APPTS ARE READY TO BE MADE: [x ] YES    Best Family or Patient Contact (if needed):    Additional Information about above appointments (if needed):    1: Multiple skin wounds, sacrum wound- follow up with Wound Center [address] [facility] [phone]  2: Hypotension, QT prolongation- Cardiology follow up  3: Septic shock/sacral wound- Infectious Disease follow up    Other comments or requests:    APPTS ARE READY TO BE MADE: [x ] YES    Best Family or Patient Contact (if needed):    Additional Information about above appointments (if needed):    1: Multiple skin wounds, sacrum wound- follow up with Wound Center [address] [facility] [phone]  2: Hypotension, QT prolongation- Cardiology follow up  3: Septic shock/sacral wound- Infectious Disease follow up    Other comments or requests:     Patient is being discharged to LTC/SNF. Caregiver will arrange follow up.

## 2024-02-20 NOTE — PROGRESS NOTE ADULT - PROVIDER SPECIALTY LIST ADULT
Cardiology
Infectious Disease
Infectious Disease
Pulmonology
Cardiology
Infectious Disease
Internal Medicine
MICU
MICU
Pulmonology
Cardiology
Infectious Disease
Infectious Disease
Internal Medicine
MICU
MICU
Wound Care
Infectious Disease
Internal Medicine
MICU
Internal Medicine
Palliative Care
Internal Medicine
Hospitalist
Internal Medicine

## 2024-02-20 NOTE — DISCHARGE NOTE NURSING/CASE MANAGEMENT/SOCIAL WORK - NSDCVIVACCINE_GEN_ALL_CORE_FT
Tdap; 21-Apr-2020 23:10; Melani Altamirano (RN); [employer]; [id number] (Exp. Date: 17-Sep-2021); IntraMuscular; Deltoid Right.; 0.5 milliLiter(s); VIS (VIS Published: 09-May-2013, VIS Presented: 21-Apr-2020);   Tdap; 19-Apr-2021 16:09; Daly Torrez (RN); [employer]; [id number] (Exp. Date: 03-Sep-2021); IntraMuscular; Dorsogluteal Left.; 0.5 milliLiter(s); VIS (VIS Published: 09-May-2013, VIS Presented: 19-Apr-2021);   Tdap; 18-Sep-2023 21:15; Dasha Bowens (RN); [employer]; [id number] (Exp. Date: 06-Jun-2025); IntraMuscular; Deltoid Right.; 0.5 milliLiter(s); VIS (VIS Published: 09-May-2013, VIS Presented: 18-Sep-2023);   
Ambulatory

## 2024-02-20 NOTE — DISCHARGE NOTE PROVIDER - NSDCMRMEDTOKEN_GEN_ALL_CORE_FT
ascorbic acid 500 mg oral tablet: 1 tab(s) orally once a day  folic acid 1 mg oral tablet: 1 tab(s) orally once a day  levothyroxine 112 mcg (0.112 mg) oral tablet: 1 tab(s) orally once a day  Multiple Vitamins oral tablet: 1 tab(s) orally once a day  polyethylene glycol 3350 oral powder for reconstitution: 17 gram(s) orally once a day  Santyl 250 units/g topical ointment: Apply topically to affected area once a day  senna leaf extract oral tablet: 2 tab(s) orally once a day (at bedtime)  sertraline 25 mg oral tablet: 1 tab(s) orally once a day  tamoxifen 20 mg oral tablet: 1 cap(s) orally once a day  thiamine 100 mg oral tablet: 1 tab(s) orally once a day   ascorbic acid 500 mg oral tablet: 1 tab(s) orally once a day  ceFAZolin 2 g/100 mL-NaCl 0.9% intravenous solution: 100 milliliter(s) intravenous every 8 hours  droxidopa 100 mg oral capsule: 3 cap(s) orally every 8 hours  folic acid 1 mg oral tablet: 1 tab(s) orally once a day  heparin: 5,000 unit(s) subcutaneous every 12 hours  insulin lispro 100 units/mL injectable solution: subcutaneous every 6 hours 1 Unit(s) if Glucose 151 - 200  2 Unit(s) if Glucose 201 - 250  3 Unit(s) if Glucose 251 - 300  4 Unit(s) if Glucose 301 - 350  5 Unit(s) if Glucose 351 - 400  6 Unit(s) if Glucose Greater Than 400  ipratropium-albuterol 0.5 mg-2.5 mg/3 mL inhalation solution: 3 milliliter(s) inhaled every 6 hours  levothyroxine: 80 microgram(s) intravenous once a day (at bedtime)  midodrine 10 mg oral tablet: 1 tab(s) orally every 8 hours  Multiple Vitamins oral tablet: 1 tab(s) orally once a day  ocular lubricant ophthalmic solution: 1 drop(s) to each affected eye 2 times a day  polyethylene glycol 3350 oral powder for reconstitution: 17 gram(s) orally once a day  senna leaf extract oral tablet: 2 tab(s) orally once a day (at bedtime)  sertraline 25 mg oral tablet: 1 tab(s) orally once a day  sodium hypochlorite 0.125% topical solution: 1 Apply topically to affected area every 12 hours  tamoxifen 20 mg oral tablet: 1 cap(s) orally once a day  thiamine 100 mg oral tablet: 1 tab(s) orally once a day

## 2024-02-20 NOTE — PROGRESS NOTE ADULT - REASON FOR ADMISSION
Sepsis 2/2 osteomyelitis
Sepsis 2/2 sacral wounds
Sepsis 2/2 osteomyelitis

## 2024-02-20 NOTE — DISCHARGE NOTE NURSING/CASE MANAGEMENT/SOCIAL WORK - PATIENT PORTAL LINK FT
You can access the FollowMyHealth Patient Portal offered by [facility] by registering at the following website: [email or web address]. By joining [employer]’s FollowMyHealth portal, you will also be able to view your health information using other applications (apps) compatible with our system.

## 2024-02-20 NOTE — DISCHARGE NOTE PROVIDER - HOSPITAL COURSE
HPI:  Pt non-verbal at baseline. Hx obtained via chart review and .    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 Found to have leukocytosis, tachycardia and hypotension here as well. Difficult to assess, contracted and non-verbal.    Spangler cathter placed in ED, POA.    Of note, patient was recently discharged after hospitalization for UTI and hypernatremia complicated by AHRF 2/2 to aspiration pneumonia. She is s/p PEG placement on 1/11/24. (30 Jan 2024 23:53)    Hospital Course:  Infectious Disease was consulted for sepsis and osteomyelitis. Blood cultures grew MSSA. MRI spine shows Prominent sacral decubitus ulcer extending to the coccygeal tip without evidence of underlying osteomyelitis or discrete fluid collections in the surrounding soft tissues. Patient was treated with IV Vancomycin, Zosyn, Cefepime, and then Cefazolin+Flagyl for wounds and ?osteo. Flagyl discontinued prior to discharge. Cefazolin planned to continue after discharge via midline 2/29 to complete a 4 week course. Cardiology was consulted for hypotension, recommended midodrine and droxidopa. Palliative was consulted for goals of care, patient confirmed Full Code. Wound care was consulted for multiple wounds of sacrum, buttocks, right hip, left shoulder/hip/spine, left ear, left foot. Wound care recommendations followed. Plan for outpatient Wound Care follow up. RRT on 1/31 for hypotension BP 40s/20s, but on repeat BP systolic 80s-90s with MAP >65. Patient was baseline mental status, nonverbal and bedbound at baseline, improved with IV fluid bolus. RRT on 2/2 called for Hypotension of 76/45 and hypoxic requiring increased O2 to 4L NC, given bolus and started on IV levophed, droxidopa. Patient admitted to MICU for septic shock ISO sacral ulcer w/ MSSA bacteremia. Patient initially on vancomycin and zosyn, narrowed to ancef based on blood culture sensitivities. Antibiotics were broadened to cefepime/flagyl per ID recs to cover possible gram negative sacral wound infection. Weaned off levophed 2/8 overnight, weaning off droxidopa. PO midodrine dose increased 10mg TID to 15mg TID. Patient was then transferred back to general medicine floors. Midline placed 2/10 for IV antibiotics. Pulmonology was consulted for hypoxemia- patient's supplemental O2 needs increased until patient required HFNC. Recommended aggressive airway clearance, recommend duonebs q6, hypersal, chest PT. Patient was weaned to room air by the time of discharge. RRT on 2/13 for hypoxia, likely due to chronic aspiration of tube feeds, placed back on HFNC. Weaned to room air prior to discharge. IV lasix doses given as needed for volume overload.     PT evaluated patient and recommended return to LTC/SNF upon DC. Decision was made to discharge to [facility] as spouse unable to care for PEG at home. Discharge plan with medication reconciliation discussed with attending Dr. Escalona. Patient is medically cleared for discharge to [facility].      Important/Active Issues for Follow-Up:  Multiple wounds, severe sacral wound- wound care follow up    Medication Changes and Reason:  Continue IV cefazolin until 2/29 for infected wounds    Advance Directives:  [x] Full code [ ] Hospice [ ] DNR    Discharge Diagnoses:   HPI:  Pt non-verbal at baseline. Hx obtained via chart review and .    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 Found to have leukocytosis, tachycardia and hypotension here as well. Difficult to assess, contracted and non-verbal.    Spangler cathter placed in ED, POA.    Of note, patient was recently discharged after hospitalization for UTI and hypernatremia complicated by AHRF 2/2 to aspiration pneumonia. She is s/p PEG placement on 1/11/24. (30 Jan 2024 23:53)    Hospital Course:  Infectious Disease was consulted for sepsis and osteomyelitis. Blood cultures grew MSSA. MRI spine shows Prominent sacral decubitus ulcer extending to the coccygeal tip without evidence of underlying osteomyelitis or discrete fluid collections in the surrounding soft tissues. Patient was treated with IV Vancomycin, Zosyn, Cefepime, and then Cefazolin+Flagyl for wounds and ?osteo. Flagyl discontinued prior to discharge. Cefazolin planned to continue after discharge via midline 2/29 to complete a 4 week course. Cardiology was consulted for hypotension, recommended midodrine and droxidopa. Palliative was consulted for goals of care, patient confirmed Full Code. Wound care was consulted for multiple wounds of sacrum, buttocks, right hip, left shoulder/hip/spine, left ear, left foot. Wound care recommendations followed. Plan for outpatient Wound Care follow up. RRT on 1/31 for hypotension BP 40s/20s, but on repeat BP systolic 80s-90s with MAP >65. Patient was baseline mental status, nonverbal and bedbound at baseline, improved with IV fluid bolus. RRT on 2/2 called for Hypotension of 76/45 and hypoxic requiring increased O2 to 4L NC, given bolus and started on IV levophed, droxidopa. Patient admitted to MICU for septic shock ISO sacral ulcer w/ MSSA bacteremia. Patient initially on vancomycin and zosyn, narrowed to ancef based on blood culture sensitivities. Antibiotics were broadened to cefepime/flagyl per ID recs to cover possible gram negative sacral wound infection. Weaned off levophed 2/8 overnight, weaning off droxidopa. PO midodrine dose increased 10mg TID to 15mg TID. Patient was then transferred back to general medicine floors. Midline placed 2/10 for IV antibiotics. Pulmonology was consulted for hypoxemia- patient's supplemental O2 needs increased until patient required HFNC. Recommended aggressive airway clearance, recommend duonebs q6, hypersal, chest PT. Patient was weaned to room air by the time of discharge. RRT on 2/13 for hypoxia, likely due to chronic aspiration of tube feeds, placed back on HFNC. Weaned to room air prior to discharge. IV lasix doses given as needed for volume overload.     PT evaluated patient and recommended return to LTC/SNF upon DC. Decision was made to discharge to [facility] as spouse unable to care for PEG at home. Discharge plan with medication reconciliation discussed with attending Dr. Escalona. Patient is medically cleared for discharge to [facility].      Important/Active Issues for Follow-Up:  Multiple wounds, severe sacral wound- wound care follow up  Sacral wound infection with IV abx, septic shock- Infectious Disease follow up  Hypotension- cardiology follow up    Medication Changes and Reason:  Continue IV cefazolin until 2/29 for infected wounds/septic shock  Droxidopa and midodrine added for hypotension  Dakins solution added for wounds  Duonebs added for AHRF    Advance Directives:  [x] Full code [ ] Hospice [ ] DNR    Discharge Diagnoses:  Hypotension  Septic Shock  QT prolongation  urinary retention  AHRF  Multiple skin wounds HPI:  Pt non-verbal at baseline. Hx obtained via chart review and .    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 Found to have leukocytosis, tachycardia and hypotension here as well. Difficult to assess, contracted and non-verbal.    Spangler cathter placed in ED, POA.    Of note, patient was recently discharged after hospitalization for UTI and hypernatremia complicated by AHRF 2/2 to aspiration pneumonia. She is s/p PEG placement on 1/11/24. (30 Jan 2024 23:53)    Hospital Course:  Infectious Disease was consulted for sepsis and osteomyelitis. Blood cultures grew MSSA. MRI spine shows Prominent sacral decubitus ulcer extending to the coccygeal tip without evidence of underlying osteomyelitis or discrete fluid collections in the surrounding soft tissues. Patient was treated with IV Vancomycin, Zosyn, Cefepime, and then Cefazolin+Flagyl for wounds and ?osteo. Flagyl discontinued prior to discharge. Cefazolin planned to continue after discharge via midline 2/29 to complete a 4 week course. Cardiology was consulted for hypotension, recommended midodrine and droxidopa. Palliative was consulted for goals of care, patient confirmed Full Code. Wound care was consulted for multiple wounds of sacrum, buttocks, right hip, left shoulder/hip/spine, left ear, left foot. Wound care recommendations followed. Plan for outpatient Wound Care follow up. RRT on 1/31 for hypotension BP 40s/20s, but on repeat BP systolic 80s-90s with MAP >65. Patient was baseline mental status, nonverbal and bedbound at baseline, improved with IV fluid bolus. RRT on 2/2 called for Hypotension of 76/45 and hypoxic requiring increased O2 to 4L NC, given bolus and started on IV levophed, droxidopa. Patient admitted to MICU for septic shock ISO sacral ulcer w/ MSSA bacteremia. Patient initially on vancomycin and zosyn, narrowed to Ancef based on blood culture sensitivities. Antibiotics were broadened to cefepime/flagyl per ID recs to cover possible gram negative sacral wound infection. Weaned off levophed 2/8 overnight, weaning off droxidopa. PO midodrine dose increased 10mg TID to 15mg TID. Patient was then transferred back to general medicine floors. Midline placed 2/10 for IV antibiotics. Pulmonology was consulted for hypoxemia- patient's supplemental O2 needs increased until patient required HFNC. Recommended aggressive airway clearance, recommend duonebs q6, hypersal, chest PT. Patient was weaned to room air by the time of discharge. RRT on 2/13 for hypoxia, likely due to chronic aspiration of tube feeds, placed back on HFNC. Weaned to room air prior to discharge. IV lasix doses given as needed for volume overload.     PT evaluated patient and recommended return to LTC/SNF upon DC. Decision was made to discharge to [facility] as spouse unable to care for PEG at home. Discharge plan with medication reconciliation discussed with attending Dr. Escalona. Patient is medically cleared for discharge to [facility].      Important/Active Issues for Follow-Up:  Multiple wounds, severe sacral wound- wound care follow up  Sacral wound infection with IV abx, septic shock- Infectious Disease follow up  Hypotension- cardiology follow up    Medication Changes and Reason:  Continue IV cefazolin until 2/29 for infected wounds/septic shock  Droxidopa and midodrine added for hypotension  Dakins solution added for wounds  Duonebs added for AHRF    Advance Directives:  [x] Full code [ ] Hospice [ ] DNR    Discharge Diagnoses:  Hypotension  Septic Shock  QT prolongation  urinary retention  AHRF  Multiple skin wounds

## 2024-02-20 NOTE — DISCHARGE NOTE PROVIDER - CARE PROVIDER_API CALL
Rajinder Quinones  Cardiovascular Disease  [address], [address]  [place] [address]  Phone: [phone]  Fax: [phone]  Follow Up Time: 2 weeks    Katherin Teixeira)  Infectious Disease  [address] [place] [address]  Phone: [phone]  Fax: [phone]  Follow Up Time: 2 weeks

## 2024-02-20 NOTE — PROGRESS NOTE ADULT - NS ATTEND AMEND GEN_ALL_CORE FT
Patient care and plan discussed and reviewed with Advanced Care Provider. Plan as outlined above edited by me to reflect our discussion.

## 2024-02-20 NOTE — PROGRESS NOTE ADULT - SUBJECTIVE AND OBJECTIVE BOX
DATE OF SERVICE: 02-20-24 @ 18:34    Patient is a 68y old  Female who presents with a chief complaint of Sepsis 2/2 osteomyelitis (20 Feb 2024 14:42)      INTERVAL HISTORY:     REVIEW OF SYSTEMS:  CONSTITUTIONAL: No weakness  EYES/ENT: No visual changes;  No throat pain   NECK: No pain or stiffness  RESPIRATORY: No cough, wheezing; No shortness of breath  CARDIOVASCULAR: No chest pain or palpitations  GASTROINTESTINAL: No abdominal  pain. No nausea, vomiting, or hematemesis  GENITOURINARY: No dysuria, frequency or hematuria  NEUROLOGICAL: No stroke like symptoms  SKIN: No rashes    TELEMETRY Personally reviewed: NSR   	  MEDICATIONS:  droxidopa 300 milliGRAM(s) Oral every 8 hours  midodrine 10 milliGRAM(s) Oral every 8 hours        PHYSICAL EXAM:  T(C): 36.7 (02-20-24 @ 12:40), Max: 37.7 (02-20-24 @ 08:16)  HR: 94 (02-20-24 @ 12:40) (94 - 100)  BP: 101/63 (02-20-24 @ 12:40) (101/63 - 120/77)  RR: 18 (02-20-24 @ 12:40) (18 - 18)  SpO2: 98% (02-20-24 @ 12:40) (95% - 99%)  Wt(kg): --  I&O's Summary    19 Feb 2024 07:01  -  20 Feb 2024 07:00  --------------------------------------------------------  IN: 895 mL / OUT: 800 mL / NET: 95 mL    20 Feb 2024 07:01  -  20 Feb 2024 18:34  --------------------------------------------------------  IN: 845 mL / OUT: 350 mL / NET: 495 mL          Appearance: In no distress	  HEENT:    PERRL, EOMI	  Cardiovascular:  S1 S2, No JVD  Respiratory: Lungs clear to auscultation	  Gastrointestinal:  Soft, Non-tender, + BS	  Vascularature:  No edema of LE  Psychiatric: Appropriate affect   Neuro: no acute focal deficits                               9.7    7.25  )-----------( 159      ( 20 Feb 2024 08:49 )             35.5     02-20    140  |  104  |  19  ----------------------------<  117<H>  5.1   |  23  |  0.31<L>    Ca    8.6      20 Feb 2024 08:49  Phos  2.8     02-20  Mg     2.3     02-20          Labs personally reviewed      ASSESSMENT/PLAN: 	  69 yo F with hx TBI (nonverbal, bedbound/contracted at baseline) s/p PEG last admission due to recurrent hypernatremia and chronic malnutrition, CVA, hypothyroidism, L breast cancer (s/p mastectomy tissue expander still in place ) who presented for decreased responsiveness and hypotension, found to have sepsis with MSSA bacteremia, likely source sacral ulcer w/ c/f possible osteomyelitis. Subsequently required ICU care given hypotension 2/2 likely septic shock, requiring pressors.       1. Problem/Plan:  Problem: Hypotension - improving   Septic Shock 2/2 Bacteremia with MSSA.   - Previously in MICU requiring pressors, now off levo   -c/w Midodrine 10mg PO q8h  - c/w droxidopa - decreased to 300 mg q8h    2. Problem/Plan:  Problem: Hypothyroidism.   - Continue levothyroxine  - TSH wnl.    3. Problem/Plan:  Problem: DVT ppx  - Continue subq Heparin            Iolani Behrbom, AG-NP   Rajinder Quinones DO [facility]  Cardiovascular Medicine  [address], [address]  Available through call or text on [employer]  Office: [phone]

## 2024-02-20 NOTE — DISCHARGE NOTE PROVIDER - PROVIDER TOKENS
PROVIDER:[TOKEN:[[id number]],FOLLOWUP:[2 weeks]],PROVIDER:[TOKEN:[[id number]],FOLLOWUP:[2 weeks]]

## 2024-02-23 ENCOUNTER — NON-APPOINTMENT (OUTPATIENT)
Age: 69
End: 2024-02-23

## 2024-03-08 RX ORDER — SERTRALINE 25 MG/1
1 TABLET, FILM COATED ORAL
Refills: 0 | DISCHARGE

## 2024-03-08 RX ORDER — FOLIC ACID 0.8 MG
1 TABLET ORAL
Refills: 0 | DISCHARGE

## 2024-03-08 RX ORDER — LEVOTHYROXINE SODIUM 125 MCG
1 TABLET ORAL
Refills: 0 | DISCHARGE

## 2024-03-08 RX ORDER — COLLAGENASE CLOSTRIDIUM HIST. 250 UNIT/G
1 OINTMENT (GRAM) TOPICAL
Refills: 0 | DISCHARGE

## 2024-03-08 RX ORDER — TAMOXIFEN CITRATE 20 MG/1
1 TABLET, FILM COATED ORAL
Qty: 0 | Refills: 0 | DISCHARGE

## 2024-03-08 RX ORDER — LEVOTHYROXINE SODIUM 125 MCG
1 TABLET ORAL
Qty: 0 | Refills: 0 | DISCHARGE

## 2024-03-08 RX ORDER — TAMOXIFEN CITRATE 20 MG/1
1 TABLET, FILM COATED ORAL
Refills: 0 | DISCHARGE

## 2024-03-08 RX ORDER — AMLODIPINE BESYLATE 2.5 MG/1
1 TABLET ORAL
Qty: 0 | Refills: 0 | DISCHARGE

## 2024-06-17 NOTE — ED PROVIDER NOTE - PROGRESS NOTE DETAILS
[facility] Food Resources*  (Call [employer]/211 if need more resources.)      [facility] and the [facility]  What they offer: Assistance with finding a food pantry, soup kitchen or resource near you.  Website: https://Recommendoline.org/get-help/community-resources/  Provide instructions for assistance with SNAP ([place]) application on this site.     SNAP (formerly [place])  What they offer: SNAP is used like cash to buy eligible food items from authorized retailers.  Apply for benefits online: https://www.C[place].gov/  Apply for benefits by telephone: 276.249.1381  Apply for benefits at local Department of      Meals on Wheels   What they offer: Meals on Wheels is a program that delivers meals to individuals age 60+ at home who are unable to purchase or prepare their own meals.   Website: https://Dashride.org/how-we-help/meals-on-wheels/  Requirements can vary by program and areas served.   To apply:  Contact [facility]: [phone] (Mon -Fri 8:30 a.m. to 4:30 p.m.)  Coverage Area:  Critical access hospital, [facility] & [facility]  Website: [email or web address].org/contact-us/  Contact [phone] (Mon - Fri 8:00am to 5:30pm)  Coverage Areas: [facility], [facility].    [employer]  What they offer:  [employer] provides comprehensive services to the disadvantaged/low-income community, homebound seniors and homeless in [place], \[facility] [place] [facility]\"", and [facility].  Phone Number: [phone]  [address] [place] [address]  Services:  Food pantry (Monday, Wednesday, Friday), Soup kitchen, Senior Grocery Delivery Program, Food Bank, hygiene essentials, aid with completing SNAP applications.   Website:  Patient with reassuring CBC at this time.  Awaiting head and neck CT Patient with reassuring labs at this time.  Exam remained stable.  Pending CT read, will repair laceration and disposition home.   Rikki Moreno DO  PGY6 Pediatric Emergency Fellow Patient with reassuring CBC at this time.  Awaiting head and neck CT  Rikki Moreno DO  PGY6 Pediatric Emergency Fellow Patient with left eyebrow laceration repaired without complication.  Reviewed imaging with son and reported no acute intervention necessary at this time.  will clean hair and disposition patient home with son  Rikki Moreno DO  PGY6 Pediatric Emergency Fellow

## 2024-06-19 NOTE — ED ADULT NURSE NOTE - CAS EDN DISCHARGE ASSESSMENT
DISCHARGE SUMMARY:  Discharge Time: 1630   Via:  Wheelchair  Accompanied by:  Relative  Verbal Instructions given: Yes  Verbalized understanding: Yes  Written Instructions given: Yes  Discharge Signature Obtained: N/A   Patient Able to Void: Yes  Discharged Stable Per MD Order: Yes    All questions answered    Alert and oriented to person, place and time

## 2025-06-21 NOTE — PROCEDURE NOTE - NSLACNUMBEROFLAC_SKIN_ALL_CORE
Problem: ABCDS Injury Assessment  Goal: Absence of physical injury  6/21/2025 1900 by Yanna Duval, RN  Outcome: Progressing  6/21/2025 1753 by Yanna Duval, RN  Outcome: Progressing     Problem: Discharge Planning  Goal: Discharge to home or other facility with appropriate resources  6/21/2025 1900 by Yanna Duval, RN  Outcome: Progressing  6/21/2025 1753 by Yanna Duval, RN  Outcome: Progressing     Problem: Chronic Conditions and Co-morbidities  Goal: Patient's chronic conditions and co-morbidity symptoms are monitored and maintained or improved  6/21/2025 1900 by Yanna Duval, RN  Outcome: Progressing  6/21/2025 1753 by Yanna Duval, RN  Outcome: Progressing     Problem: Pain  Goal: Verbalizes/displays adequate comfort level or baseline comfort level  6/21/2025 1900 by Yanna Duval, RN  Outcome: Progressing  6/21/2025 1753 by Yanna Duval, RN  Outcome: Progressing     Problem: Safety - Adult  Goal: Free from fall injury  6/21/2025 1900 by Yanna Duval, RN  Outcome: Progressing  6/21/2025 1753 by Yanna Duval, RN  Outcome: Progressing      2 ...

## (undated) DEVICE — SYR ALLIANCE II INFLATION 60ML

## (undated) DEVICE — SOL INJ NS 0.9% 500ML 2 PORT

## (undated) DEVICE — TUBING IV SET GRAVITY 3Y 100" MACRO

## (undated) DEVICE — CATH IV SAFE BC 20G X 1.16" (PINK)

## (undated) DEVICE — TUBING SUCTION CONN 6FT STERILE

## (undated) DEVICE — BALLOON US ENDO

## (undated) DEVICE — SUCTION YANKAUER NO CONTROL VENT

## (undated) DEVICE — SENSOR O2 FINGER ADULT

## (undated) DEVICE — FOLEY HOLDER STATLOCK 2 WAY ADULT

## (undated) DEVICE — PACK IV START WITH CHG

## (undated) DEVICE — TUBING SUCTION 20FT

## (undated) DEVICE — BITE BLOCK ADULT 20 X 27MM (GREEN)

## (undated) DEVICE — CATH IV SAFE BC 22G X 1" (BLUE)